# Patient Record
Sex: FEMALE | Race: BLACK OR AFRICAN AMERICAN | NOT HISPANIC OR LATINO | Employment: OTHER | URBAN - METROPOLITAN AREA
[De-identification: names, ages, dates, MRNs, and addresses within clinical notes are randomized per-mention and may not be internally consistent; named-entity substitution may affect disease eponyms.]

---

## 2018-01-30 ENCOUNTER — HOSPITAL ENCOUNTER (EMERGENCY)
Facility: HOSPITAL | Age: 23
Discharge: HOME | End: 2018-01-31
Attending: EMERGENCY MEDICINE
Payer: COMMERCIAL

## 2018-01-30 DIAGNOSIS — K52.9 CHRONIC DIARRHEA: Primary | ICD-10-CM

## 2018-01-30 PROCEDURE — 99283 EMERGENCY DEPT VISIT LOW MDM: CPT | Mod: ,,, | Performed by: EMERGENCY MEDICINE

## 2018-01-30 PROCEDURE — 99283 EMERGENCY DEPT VISIT LOW MDM: CPT

## 2018-01-30 RX ORDER — CITALOPRAM 20 MG/1
20 TABLET, FILM COATED ORAL DAILY
COMMUNITY
End: 2019-10-21 | Stop reason: SDUPTHER

## 2018-01-30 RX ORDER — INSULIN ASPART 100 [IU]/ML
INJECTION, SOLUTION INTRAVENOUS; SUBCUTANEOUS
COMMUNITY
End: 2019-05-01

## 2018-01-30 RX ORDER — FERROUS GLUCONATE 324(38)MG
324 TABLET ORAL
COMMUNITY
End: 2020-08-17 | Stop reason: SDUPTHER

## 2018-01-30 RX ORDER — METOPROLOL SUCCINATE 25 MG/1
25 TABLET, EXTENDED RELEASE ORAL DAILY
COMMUNITY
End: 2019-02-18

## 2018-01-31 VITALS
TEMPERATURE: 98 F | WEIGHT: 153 LBS | HEART RATE: 99 BPM | HEIGHT: 64 IN | RESPIRATION RATE: 18 BRPM | BODY MASS INDEX: 26.12 KG/M2 | SYSTOLIC BLOOD PRESSURE: 138 MMHG | OXYGEN SATURATION: 100 % | DIASTOLIC BLOOD PRESSURE: 86 MMHG

## 2018-01-31 LAB — WBC #/AREA STL HPF: NORMAL /[HPF]

## 2018-01-31 PROCEDURE — 89055 LEUKOCYTE ASSESSMENT FECAL: CPT

## 2018-01-31 PROCEDURE — 87427 SHIGA-LIKE TOXIN AG IA: CPT

## 2018-01-31 PROCEDURE — 87045 FECES CULTURE AEROBIC BACT: CPT

## 2018-01-31 PROCEDURE — 87209 SMEAR COMPLEX STAIN: CPT

## 2018-01-31 PROCEDURE — 87338 HPYLORI STOOL AG IA: CPT

## 2018-01-31 PROCEDURE — 87046 STOOL CULTR AEROBIC BACT EA: CPT

## 2018-01-31 RX ORDER — DICYCLOMINE HYDROCHLORIDE 20 MG/1
20 TABLET ORAL 2 TIMES DAILY
Qty: 20 TABLET | Refills: 0 | Status: SHIPPED | OUTPATIENT
Start: 2018-01-31 | End: 2018-03-02

## 2018-01-31 RX ORDER — PANTOPRAZOLE SODIUM 20 MG/1
20 TABLET, DELAYED RELEASE ORAL DAILY
Qty: 30 TABLET | Refills: 0 | Status: SHIPPED | OUTPATIENT
Start: 2018-01-31 | End: 2019-02-18

## 2018-01-31 NOTE — ED TRIAGE NOTES
22 year old female presents to the ED c/o abdominal pain and diarrhea since last August. Reports several ED and PCP visits for same

## 2018-01-31 NOTE — ED PROVIDER NOTES
"Encounter Date: 1/30/2018    SCRIBE #1 NOTE: I, Rosey Yoder, am scribing for, and in the presence of,  Dr. ABNER Balderas. I have scribed the entire note.       History     Chief Complaint   Patient presents with    Diarrhea     Patient reports abd pain with diarrhea since August. Pt was seen in at [facility] ER months ago. Pt has been to her PCP, no diagnosis     22 y.o.  female with HTN and DM presents to the ED complaining of nightly abdominal cramps for 6 months. Associates symptoms include abdominal bloating, gas, and diarrhea. She denies any of these symptoms at this time. She is followed by GI, but has not provided a stool sample for the ordered stool study 2 months PTA. The results of labs ordered by GI were unremarkable. She and her father express frustration with her continued "suffering" and want to know the cause of her symptoms. The patient has a follow up appointment with GI in 7 days.        The history is provided by the patient, medical records and a parent.     Review of patient's allergies indicates:  No Known Allergies  Past Medical History:   Diagnosis Date    Diabetes mellitus     Hypertension      No past surgical history on file.  No family history on file.  Social History   Substance Use Topics    Smoking status: Never Smoker    Smokeless tobacco: Not on file    Alcohol use No     Review of Systems   Constitutional: Negative for fever.   HENT: Negative for sore throat.    Respiratory: Negative for shortness of breath.    Cardiovascular: Negative for chest pain.   Gastrointestinal: Positive for abdominal pain and diarrhea. Negative for nausea.        (+) Bloating attributed to gas.   Genitourinary: Negative for dysuria.   Musculoskeletal: Negative for back pain.   Skin: Negative for rash.   Neurological: Negative for weakness.   Hematological: Does not bruise/bleed easily.   Psychiatric/Behavioral: Positive for sleep disturbance.       Physical Exam     Initial Vitals " [01/30/18 2059]   BP Pulse Resp Temp SpO2   (!) 182/77 100 18 98 °F (36.7 °C) 100 %      MAP       112         Physical Exam    Nursing note and vitals reviewed.  Constitutional: She appears well-developed and well-nourished. No distress.   HENT:   Head: Normocephalic and atraumatic.   Mouth/Throat: Oropharynx is clear and moist.   Eyes: Conjunctivae are normal.   Neck: Normal range of motion.   Cardiovascular: Normal rate, regular rhythm and normal heart sounds.   Pulmonary/Chest: Breath sounds normal. No respiratory distress.   Abdominal: Soft. She exhibits no distension. There is no tenderness.   Musculoskeletal: Normal range of motion.   Neurological: She is alert and oriented to person, place, and time.   Skin: Skin is warm and dry.         ED Course   Procedures  Labs Reviewed   CULTURE, STOOL   ENTEROHEMORRHAGIC E.COLI   WBC, STOOL   H. PYLORI ANTIGEN, STOOL   STOOL EXAM-OVA,CYSTS,PARASITES             Medical Decision Making:   History:   Old Medical Records: I decided to obtain old medical records.  Clinical Tests:   Lab Tests: Ordered and Reviewed            Scribe Attestation:   Scribe #1: I performed the above scribed service and the documentation accurately describes the services I performed. I attest to the accuracy of the note.    Attending Attestation:             Attending ED Notes:   Patient presents for a non emergent evaluation of a complaint that she is not currently have symptoms for. I had a aaliyah discussion with the patient and her father. She was non compliant with her initial GI specialist evaluation and never did the stool studies. This daily diarrhea she endorses is not causing weight loss, hemodynamic instability or disruption to the patients life. It is unclear what could possibly be accomplished in the emergent setting. I have offered to send the stool studies tonight and she does state she has a GI appointment schedule with [facility] on Wednesday, however after her discharge I did  note that there is no appointment in the computer noted. I will start pepcid and bentl and recommended a stool diary to present to the GI doctor.          ED Course      Clinical Impression:   The encounter diagnosis was Chronic diarrhea.    Disposition:   Disposition: Discharged  Condition: Stable                        Andrés Balderas MD  02/01/18 0733

## 2018-02-01 LAB
E COLI SXT1 STL QL IA: NEGATIVE
E COLI SXT2 STL QL IA: NEGATIVE
O+P STL TRI STN: NORMAL

## 2018-02-03 LAB — BACTERIA STL CULT: NORMAL

## 2018-02-04 LAB — H PYLORI AG STL QL: NOT DETECTED

## 2018-09-26 ENCOUNTER — TELEPHONE (OUTPATIENT)
Dept: ENDOCRINOLOGY | Facility: CLINIC | Age: 23
End: 2018-09-26

## 2019-02-14 NOTE — PROGRESS NOTES
Subjective:       Patient ID: Isabela Read is a 24 y.o. female with a PMH significant for T1D who was followed at [facility] and [facility].  Her Insurance has changed, so switching to [facility].    Chief Complaint: Establish Care    HPI     Patient denies f/c, n/v/d.  No chest pain or SOB.  No abdominal pain or dysuria.  No headaches or change in vision.  No dizziness.  No significant  weight gain or weight loss.  Remaining ROS negative.    Review of Systems   Constitutional: Negative for appetite change, chills, diaphoresis, fatigue, fever and unexpected weight change.   HENT: Negative for ear pain, hearing loss, sinus pain, tinnitus, trouble swallowing and voice change.    Eyes: Negative for photophobia, pain and visual disturbance.   Respiratory: Negative for chest tightness, shortness of breath and wheezing.    Cardiovascular: Positive for leg swelling. Negative for chest pain and palpitations.   Gastrointestinal: Negative for abdominal pain, blood in stool, constipation, diarrhea, nausea and vomiting.   Endocrine: Negative for cold intolerance, heat intolerance, polydipsia, polyphagia and polyuria.   Genitourinary: Negative for decreased urine volume, difficulty urinating, dysuria, flank pain, hematuria, pelvic pain, vaginal bleeding, vaginal discharge and vaginal pain.   Musculoskeletal: Negative for arthralgias, gait problem, joint swelling, myalgias and neck pain.   Skin: Negative for rash.   Neurological: Negative for dizziness, tremors, syncope, weakness, numbness and headaches.   Hematological: Does not bruise/bleed easily.   Psychiatric/Behavioral: Negative for agitation, confusion and sleep disturbance. The patient is not nervous/anxious.        Objective:      Physical Exam   Constitutional: She is oriented to person, place, and time. She appears well-developed and well-nourished. No distress.   HENT:   Head: Normocephalic and atraumatic.   Right Ear: External ear normal.   Left Ear: External ear  normal.   Nose: Nose normal.   Mouth/Throat: Oropharynx is clear and moist.   Eyes: Conjunctivae and EOM are normal. Pupils are equal, round, and reactive to light. No scleral icterus.   Neck: Normal range of motion. Neck supple. No JVD present. No thyromegaly present.   Cardiovascular: Normal rate, regular rhythm and intact distal pulses. Exam reveals no gallop and no friction rub.   No murmur heard.  Pulmonary/Chest: Effort normal and breath sounds normal. No respiratory distress. She has no wheezes. She has no rales.   Abdominal: Soft. Bowel sounds are normal. She exhibits no distension. There is no tenderness. There is no rebound and no guarding.   Musculoskeletal: Normal range of motion. She exhibits no edema.   Lymphadenopathy:     She has no cervical adenopathy.   Neurological: She is alert and oriented to person, place, and time. No cranial nerve deficit or sensory deficit.   Skin: Skin is warm and dry. No rash noted. No erythema.   Psychiatric: She has a normal mood and affect. Her behavior is normal. Thought content normal.       Assessment:       1. Encounter to establish care    2. Type 1 diabetes mellitus with complication    3. Essential hypertension    4. Leg swelling    5. Restrictive lung disease    6. Cervical cancer screening    7. Screen for STD (sexually transmitted disease)    8. Iron deficiency anemia, unspecified iron deficiency anemia type        Plan:   -Today's Visit - patient is awake and alert.    -Endocrine - T1D (diagnosed in 2004) - Followed at [facility] and next appointment scheduled for 2/27/2019.  Has been uncontrolled.  On Novolog SSI, Levemir 37 units at bedtime.  A1C was 11.9 in 5/2018.  Will repeat fasting labs with A1C.  Will refer to Endocrine.     Microalbumin ratio was <20 in 8/2015.  Last Eye exam - 9/2018 at [facility] and normal per patient.  Last Foot Exam - at her Endocrine last year.  Repeat next visit.    TSH was normal in 7/2018.    -Cards - HTN - on Losartan 50mg and  "Metoprolol XL 50mg.   She was prescribed Losartan-HCTZ 100-25 in 8/2018, but patient went back to the Losartan 50mg since she was not "peeing" with the Losartan.    EKG in 7/2018 with NSR with possible LAE.    Lipids in 5/2018 with , , HDL 77, LDL 98.  Repeat and needs a statin.    -Vascular - Bilateral LE Edema - first noticed in 6/2018.  Placed on Losartan-HCTZ in 8/2018.  Patient did not notice much difference, so changed herself back to Losartan alone.  Will check bilateral LE dopplers.  Check urine microalbumin ratio.  Change back to Losartan-HCTZ 100-25.    -GI - Chronic Abdominal Cramps and Diarrhea - seen in the ED on 1/30/2018 at [facility] with above symptoms for 6 months.  Stool WB negative, Stool O&P negative, Stool H pylori negative.  No imaging done.  Had EGD done in 8/2018 at [facility].  Improved.    -Heme - Anemia - Hgb 9.1 in 7/2018.  WBC and Plts normal.  History of BALDOMERO.  Repeat CBC and check Fe studies.    -Pulmonary - on Albuterol.  Had PFTs done at [facility] and told she had restrictive lung disease.  She saw a Pulmonary Specialist at [facility].  CT Chest without done in 8/2018 and unremarkable.  Will refer to Pulmonary.    -Psych - Depression/Anxiety - on Celexa since around 2017.    -GYN - Last Pap was over three year.  Will refer to GYN. We discussed HPV vaccinations - she thinks she did.   We discussed STD screening - HIV 1/2 negative in 10/2016.  On Microgestin FE.    -HCM - We discussed Flu (9/2018) and Tdap (9/2013) vaccinations.  We discussed Pneumococcal (23 - 1/2016) vaccinations.      -Follow Up - 1 weeks  "

## 2019-02-18 ENCOUNTER — OFFICE VISIT (OUTPATIENT)
Dept: OBSTETRICS AND GYNECOLOGY | Facility: CLINIC | Age: 24
End: 2019-02-18
Payer: COMMERCIAL

## 2019-02-18 ENCOUNTER — OFFICE VISIT (OUTPATIENT)
Dept: PRIMARY CARE CLINIC | Facility: CLINIC | Age: 24
End: 2019-02-18
Payer: COMMERCIAL

## 2019-02-18 VITALS
SYSTOLIC BLOOD PRESSURE: 170 MMHG | HEIGHT: 64 IN | HEART RATE: 115 BPM | WEIGHT: 155.44 LBS | OXYGEN SATURATION: 97 % | DIASTOLIC BLOOD PRESSURE: 110 MMHG | BODY MASS INDEX: 26.54 KG/M2

## 2019-02-18 VITALS
BODY MASS INDEX: 26.72 KG/M2 | DIASTOLIC BLOOD PRESSURE: 122 MMHG | SYSTOLIC BLOOD PRESSURE: 196 MMHG | HEIGHT: 64 IN | WEIGHT: 156.5 LBS

## 2019-02-18 DIAGNOSIS — I10 ESSENTIAL HYPERTENSION: ICD-10-CM

## 2019-02-18 DIAGNOSIS — D50.9 IRON DEFICIENCY ANEMIA, UNSPECIFIED IRON DEFICIENCY ANEMIA TYPE: ICD-10-CM

## 2019-02-18 DIAGNOSIS — M79.89 LEG SWELLING: ICD-10-CM

## 2019-02-18 DIAGNOSIS — Z11.3 SCREEN FOR STD (SEXUALLY TRANSMITTED DISEASE): ICD-10-CM

## 2019-02-18 DIAGNOSIS — Z01.419 ENCOUNTER FOR GYNECOLOGICAL EXAMINATION WITHOUT ABNORMAL FINDING: Primary | ICD-10-CM

## 2019-02-18 DIAGNOSIS — Z12.4 CERVICAL CANCER SCREENING: ICD-10-CM

## 2019-02-18 DIAGNOSIS — E10.8 TYPE 1 DIABETES MELLITUS WITH COMPLICATION: ICD-10-CM

## 2019-02-18 DIAGNOSIS — J98.4 RESTRICTIVE LUNG DISEASE: ICD-10-CM

## 2019-02-18 DIAGNOSIS — Z76.89 ENCOUNTER TO ESTABLISH CARE: Primary | ICD-10-CM

## 2019-02-18 LAB
C TRACH DNA SPEC QL NAA+PROBE: NOT DETECTED
N GONORRHOEA DNA SPEC QL NAA+PROBE: NOT DETECTED

## 2019-02-18 PROCEDURE — 99999 PR PBB SHADOW E&M-EST. PATIENT-LVL V: ICD-10-PCS | Mod: PBBFAC,,, | Performed by: INTERNAL MEDICINE

## 2019-02-18 PROCEDURE — 87491 CHLMYD TRACH DNA AMP PROBE: CPT

## 2019-02-18 PROCEDURE — 99385 PREV VISIT NEW AGE 18-39: CPT | Mod: S$GLB,,, | Performed by: OBSTETRICS & GYNECOLOGY

## 2019-02-18 PROCEDURE — 99999 PR PBB SHADOW E&M-EST. PATIENT-LVL II: CPT | Mod: PBBFAC,,, | Performed by: OBSTETRICS & GYNECOLOGY

## 2019-02-18 PROCEDURE — 99385 PR PREVENTIVE VISIT,NEW,18-39: ICD-10-PCS | Mod: S$GLB,,, | Performed by: OBSTETRICS & GYNECOLOGY

## 2019-02-18 PROCEDURE — 99999 PR PBB SHADOW E&M-EST. PATIENT-LVL V: CPT | Mod: PBBFAC,,, | Performed by: INTERNAL MEDICINE

## 2019-02-18 PROCEDURE — 88175 CYTOPATH C/V AUTO FLUID REDO: CPT

## 2019-02-18 PROCEDURE — 99999 PR PBB SHADOW E&M-EST. PATIENT-LVL II: ICD-10-PCS | Mod: PBBFAC,,, | Performed by: OBSTETRICS & GYNECOLOGY

## 2019-02-18 PROCEDURE — 99204 PR OFFICE/OUTPT VISIT, NEW, LEVL IV, 45-59 MIN: ICD-10-PCS | Mod: S$GLB,,, | Performed by: INTERNAL MEDICINE

## 2019-02-18 PROCEDURE — 99204 OFFICE O/P NEW MOD 45 MIN: CPT | Mod: S$GLB,,, | Performed by: INTERNAL MEDICINE

## 2019-02-18 RX ORDER — LOSARTAN POTASSIUM AND HYDROCHLOROTHIAZIDE 25; 100 MG/1; MG/1
1 TABLET ORAL DAILY
Qty: 30 TABLET | Refills: 3 | Status: SHIPPED | OUTPATIENT
Start: 2019-02-18 | End: 2019-02-18 | Stop reason: SDUPTHER

## 2019-02-18 RX ORDER — AMOXICILLIN 500 MG/1
500 TABLET, FILM COATED ORAL EVERY 12 HOURS
Qty: 20 TABLET | Refills: 0 | Status: CANCELLED | OUTPATIENT
Start: 2019-02-18 | End: 2019-02-28

## 2019-02-18 RX ORDER — METOPROLOL SUCCINATE 50 MG/1
50 TABLET, EXTENDED RELEASE ORAL DAILY
Qty: 30 TABLET | Refills: 3 | Status: SHIPPED | OUTPATIENT
Start: 2019-02-18 | End: 2019-10-21 | Stop reason: SDUPTHER

## 2019-02-18 RX ORDER — LOSARTAN POTASSIUM AND HYDROCHLOROTHIAZIDE 25; 100 MG/1; MG/1
1 TABLET ORAL DAILY
Qty: 30 TABLET | Refills: 3 | Status: SHIPPED | OUTPATIENT
Start: 2019-02-18 | End: 2019-03-13 | Stop reason: SDUPTHER

## 2019-02-18 RX ORDER — LOSARTAN POTASSIUM 50 MG/1
50 TABLET ORAL DAILY
COMMUNITY
End: 2019-02-18

## 2019-02-18 NOTE — PROGRESS NOTES
2/18/2019    Chlamydia, Amplified DNA Not Detected   N gonorrhoeae, amplified DNA Not Detected       PT HERE FOR ANNUAL.  SEEN BY PCP FOR HTN EARLIER TODAY.  WANTS STD SCREEN.    ROS:  GENERAL: No fever, chills, fatigability or weight loss.  VULVAR: No pain, no lesions and no itching.  VAGINAL: No relaxation, no itching, no discharge, no abnormal bleeding and no lesions.  ABDOMEN: No abdominal pain. Denies nausea. Denies vomiting. No diarrhea. No constipation  BREAST: Denies pain. No lumps. No discharge.  URINARY: No incontinence, no nocturia, no frequency and no dysuria.  CARDIOVASCULAR: No chest pain. No shortness of breath. No leg cramps.  NEUROLOGICAL: No headaches. No vision changes.  The remainder of the review of systems was negative.    PE:  General Appearance: overweight And Well developed. Well nourished. In no acute distress.  Vulva: Lesions: No.  Urethral Meatus: Normal size. Normal location. No lesions. No prolapse.  Urethra: No masses. No tenderness. No prolapse. No scarring.  Bladder: No masses. No tenderness.  Vagina: Mucosa NI:yes discharge no, atrophy no, cystocele no or rectocele no.  Cervix: Lesion: no  Stenotic: no Cervical motion tenderness: no  Uterus: Uterus size: 6 weeks. Support good. Uterus size: Normal  Adnexa: Masses: No Tenderness: No CDS Nodularity: No  Abdomen: overweight No masses. No tenderness.  Breasts: No bilateral masses. No bilateral discharge. No bilateral tenderness. No bilateral fibrocystic changes.  Neck: No thyroid enlargement. No thyroid masses.  Skin: Rashes: No      PROCEDURES:    PLAN:     DIAGNOSIS:  1. Encounter for gynecological examination without abnormal finding    2. Essential hypertension    3. Screen for STD (sexually transmitted disease)        MEDICATIONS & ORDERS:  Orders Placed This Encounter    C. trachomatis/N. gonorrhoeae by AMP DNA    Liquid-based pap smear, screening       Patient was counseled today on the new ACS guidelines for cervical  cytology screening as well as the current recommendations for breast cancer screening. She was counseled to follow up with her PCP for other routine health maintenance. Counseling session lasted approximately 10 minutes, and all her questions were answered.         FOLLOW-UP: With me in 12 month

## 2019-02-18 NOTE — LETTER
February 18, 2019      Cb Mcghee MD  [address] [facility] [address]  [place] [address]           [facility] [id number] [facility] [address]  [address]  [place] [address]  Phone: [phone]  Fax: [phone]          Patient: Isabela Read   MR Number: [id number]   YOB: 1995   Date of Visit: 2/18/2019       Dear Dr. Cb Mcghee:    Thank you for referring Isabela Read to me for evaluation. Attached you will find relevant portions of my assessment and plan of care.    If you have questions, please do not hesitate to call me. I look forward to following Isabela Read along with you.    Sincerely,    Clay Schulz Jr., MD    Enclosure  CC:  No Recipients    If you would like to receive this communication electronically, please contact [email or web address] or [phone] to request more information on [employer] Link access.    For providers and/or their staff who would like to refer a patient to [facility], please contact us through our one-stop-shop provider referral line, [facility], at [phone].    If you feel you have received this communication in error or would no longer like to receive these types of communications, please e-mail [email or web address]

## 2019-02-18 NOTE — PATIENT INSTRUCTIONS
Your exam was overall normal today.    Your blood pressure is a high today.  As discussed, please stop the Losartan 50mg and restart the Losartan-HCTZ 100-25 once a day.  Continue the Metoprolol for now.  Please monitor at home with a digital blood pressure cuff when sitting and rested for at least 15 minutes or longer.  Record your values and bring these with you on your return.  I would like you to see the nurse in 1 weeks for a blood pressure check.  Target blood pressure is less than 130/80.      I will order routine fasting labs today - at least 6-8 hours of fasting.    I will refer you to Endocrine.  I will refer you to Pulmonary.  I will refer you to GYN for your PAP.    I will order an ultrasound on your legs.    Return in 1 week - sooner if needed.  Please come at least 15-20 minutes before your scheduled appointment time.

## 2019-02-25 ENCOUNTER — OFFICE VISIT (OUTPATIENT)
Dept: PRIMARY CARE CLINIC | Facility: CLINIC | Age: 24
End: 2019-02-25
Payer: COMMERCIAL

## 2019-02-25 ENCOUNTER — LAB VISIT (OUTPATIENT)
Dept: LAB | Facility: HOSPITAL | Age: 24
End: 2019-02-25
Attending: INTERNAL MEDICINE
Payer: COMMERCIAL

## 2019-02-25 ENCOUNTER — PATIENT MESSAGE (OUTPATIENT)
Dept: PRIMARY CARE CLINIC | Facility: CLINIC | Age: 24
End: 2019-02-25

## 2019-02-25 ENCOUNTER — TELEPHONE (OUTPATIENT)
Dept: PRIMARY CARE CLINIC | Facility: CLINIC | Age: 24
End: 2019-02-25

## 2019-02-25 VITALS
OXYGEN SATURATION: 100 % | DIASTOLIC BLOOD PRESSURE: 87 MMHG | WEIGHT: 149.5 LBS | BODY MASS INDEX: 25.52 KG/M2 | HEART RATE: 82 BPM | HEIGHT: 64 IN | SYSTOLIC BLOOD PRESSURE: 135 MMHG

## 2019-02-25 DIAGNOSIS — Z11.3 SCREEN FOR STD (SEXUALLY TRANSMITTED DISEASE): ICD-10-CM

## 2019-02-25 DIAGNOSIS — E10.8 TYPE 1 DIABETES MELLITUS WITH COMPLICATION: ICD-10-CM

## 2019-02-25 DIAGNOSIS — Z76.89 ENCOUNTER TO ESTABLISH CARE: ICD-10-CM

## 2019-02-25 DIAGNOSIS — D64.9 NORMOCYTIC ANEMIA: ICD-10-CM

## 2019-02-25 DIAGNOSIS — E10.8 TYPE 1 DIABETES MELLITUS WITH COMPLICATION: Primary | ICD-10-CM

## 2019-02-25 DIAGNOSIS — R60.0 BILATERAL LOWER EXTREMITY EDEMA: ICD-10-CM

## 2019-02-25 DIAGNOSIS — D50.9 IRON DEFICIENCY ANEMIA, UNSPECIFIED IRON DEFICIENCY ANEMIA TYPE: ICD-10-CM

## 2019-02-25 DIAGNOSIS — N17.9 AKI (ACUTE KIDNEY INJURY): Primary | ICD-10-CM

## 2019-02-25 DIAGNOSIS — D64.9 ANEMIA, UNSPECIFIED TYPE: ICD-10-CM

## 2019-02-25 DIAGNOSIS — I10 ESSENTIAL HYPERTENSION: ICD-10-CM

## 2019-02-25 DIAGNOSIS — J98.4 LUNG DISEASE: ICD-10-CM

## 2019-02-25 LAB
25(OH)D3+25(OH)D2 SERPL-MCNC: 4 NG/ML
ALBUMIN SERPL BCP-MCNC: 2.5 G/DL
ALP SERPL-CCNC: 147 U/L
ALT SERPL W/O P-5'-P-CCNC: 14 U/L
ANION GAP SERPL CALC-SCNC: 8 MMOL/L
AST SERPL-CCNC: 16 U/L
BASOPHILS # BLD AUTO: 0.03 K/UL
BASOPHILS NFR BLD: 0.3 %
BILIRUB SERPL-MCNC: 0.2 MG/DL
BUN SERPL-MCNC: 38 MG/DL
CALCIUM SERPL-MCNC: 9.3 MG/DL
CHLORIDE SERPL-SCNC: 102 MMOL/L
CHOLEST SERPL-MCNC: 312 MG/DL
CHOLEST/HDLC SERPL: 5 {RATIO}
CO2 SERPL-SCNC: 26 MMOL/L
CREAT SERPL-MCNC: 1.8 MG/DL
DIFFERENTIAL METHOD: ABNORMAL
EOSINOPHIL # BLD AUTO: 0.2 K/UL
EOSINOPHIL NFR BLD: 2.5 %
ERYTHROCYTE [DISTWIDTH] IN BLOOD BY AUTOMATED COUNT: 13.3 %
EST. GFR  (AFRICAN AMERICAN): 44.8 ML/MIN/1.73 M^2
EST. GFR  (NON AFRICAN AMERICAN): 38.8 ML/MIN/1.73 M^2
ESTIMATED AVG GLUCOSE: 318 MG/DL
FERRITIN SERPL-MCNC: 23 NG/ML
GLUCOSE SERPL-MCNC: 126 MG/DL
HBA1C MFR BLD HPLC: 12.7 %
HCT VFR BLD AUTO: 31.4 %
HDLC SERPL-MCNC: 63 MG/DL
HDLC SERPL: 20.2 %
HGB BLD-MCNC: 9.9 G/DL
HIV 1+2 AB+HIV1 P24 AG SERPL QL IA: NEGATIVE
IMM GRANULOCYTES # BLD AUTO: 0.02 K/UL
IMM GRANULOCYTES NFR BLD AUTO: 0.2 %
IRON SERPL-MCNC: 165 UG/DL
LDLC SERPL CALC-MCNC: 171.2 MG/DL
LYMPHOCYTES # BLD AUTO: 4.1 K/UL
LYMPHOCYTES NFR BLD: 46.5 %
MCH RBC QN AUTO: 28.9 PG
MCHC RBC AUTO-ENTMCNC: 31.5 G/DL
MCV RBC AUTO: 92 FL
MONOCYTES # BLD AUTO: 0.7 K/UL
MONOCYTES NFR BLD: 7.5 %
NEUTROPHILS # BLD AUTO: 3.8 K/UL
NEUTROPHILS NFR BLD: 43 %
NONHDLC SERPL-MCNC: 249 MG/DL
NRBC BLD-RTO: 0 /100 WBC
PLATELET # BLD AUTO: 437 K/UL
PMV BLD AUTO: 10.8 FL
POTASSIUM SERPL-SCNC: 4.3 MMOL/L
PROT SERPL-MCNC: 6.7 G/DL
RBC # BLD AUTO: 3.42 M/UL
SATURATED IRON: 45 %
SODIUM SERPL-SCNC: 136 MMOL/L
TOTAL IRON BINDING CAPACITY: 367 UG/DL
TRANSFERRIN SERPL-MCNC: 248 MG/DL
TRIGL SERPL-MCNC: 389 MG/DL
TSH SERPL DL<=0.005 MIU/L-ACNC: 3.77 UIU/ML
WBC # BLD AUTO: 8.76 K/UL

## 2019-02-25 PROCEDURE — 99214 OFFICE O/P EST MOD 30 MIN: CPT | Mod: S$GLB,,, | Performed by: INTERNAL MEDICINE

## 2019-02-25 PROCEDURE — 99999 PR PBB SHADOW E&M-EST. PATIENT-LVL V: CPT | Mod: PBBFAC,,, | Performed by: INTERNAL MEDICINE

## 2019-02-25 PROCEDURE — 36415 COLL VENOUS BLD VENIPUNCTURE: CPT | Mod: PN

## 2019-02-25 PROCEDURE — 99999 PR PBB SHADOW E&M-EST. PATIENT-LVL V: ICD-10-PCS | Mod: PBBFAC,,, | Performed by: INTERNAL MEDICINE

## 2019-02-25 PROCEDURE — 86592 SYPHILIS TEST NON-TREP QUAL: CPT

## 2019-02-25 PROCEDURE — 82306 VITAMIN D 25 HYDROXY: CPT

## 2019-02-25 PROCEDURE — 83036 HEMOGLOBIN GLYCOSYLATED A1C: CPT

## 2019-02-25 PROCEDURE — 80061 LIPID PANEL: CPT

## 2019-02-25 PROCEDURE — 86703 HIV-1/HIV-2 1 RESULT ANTBDY: CPT

## 2019-02-25 PROCEDURE — 83540 ASSAY OF IRON: CPT

## 2019-02-25 PROCEDURE — 80053 COMPREHEN METABOLIC PANEL: CPT

## 2019-02-25 PROCEDURE — 84443 ASSAY THYROID STIM HORMONE: CPT

## 2019-02-25 PROCEDURE — 99214 PR OFFICE/OUTPT VISIT, EST, LEVL IV, 30-39 MIN: ICD-10-PCS | Mod: S$GLB,,, | Performed by: INTERNAL MEDICINE

## 2019-02-25 PROCEDURE — 85025 COMPLETE CBC W/AUTO DIFF WBC: CPT

## 2019-02-25 PROCEDURE — 82728 ASSAY OF FERRITIN: CPT

## 2019-02-25 RX ORDER — ERGOCALCIFEROL 1.25 MG/1
50000 CAPSULE ORAL
Qty: 12 CAPSULE | Refills: 1 | Status: SHIPPED | OUTPATIENT
Start: 2019-02-25 | End: 2019-05-26

## 2019-02-25 NOTE — TELEPHONE ENCOUNTER
This was he first available, and pt is on the waiting list. Will reach out to that department to see if they can move appointment up. 02*25*19 edie

## 2019-02-25 NOTE — PATIENT INSTRUCTIONS
Your exam was overall normal today.  Please miosturize your feet every day.    Your blood pressure was good.  There was no change in the blood pressure with position changes.  Make sure you drink adequate amounts of water daily.    Increase you Levemir to 40 units at bedtime and continue to check your Finger Sticks before meals and at bedtime.    I will follow up with your recent labs done this morning.  Please get your leg ultrasound as soon as you can if you continue to have swelling.    Return in 3 months - sooner if needed.  Please come at least 15-20 minutes before your scheduled appointment time.

## 2019-02-26 ENCOUNTER — PATIENT MESSAGE (OUTPATIENT)
Dept: PRIMARY CARE CLINIC | Facility: CLINIC | Age: 24
End: 2019-02-26

## 2019-02-26 LAB — RPR SER QL: NORMAL

## 2019-02-26 NOTE — TELEPHONE ENCOUNTER
Pt requesting refill on medication Levemir Insulin 100 units. Please authorize.  Lov: 02/25/2019.  Jeannine Pacheco LPN

## 2019-03-13 DIAGNOSIS — I10 ESSENTIAL HYPERTENSION: ICD-10-CM

## 2019-03-13 RX ORDER — LOSARTAN POTASSIUM AND HYDROCHLOROTHIAZIDE 25; 100 MG/1; MG/1
1 TABLET ORAL DAILY
Qty: 30 TABLET | Refills: 3 | Status: SHIPPED | OUTPATIENT
Start: 2019-03-13 | End: 2020-06-04

## 2019-03-19 ENCOUNTER — TELEPHONE (OUTPATIENT)
Dept: PRIMARY CARE CLINIC | Facility: CLINIC | Age: 24
End: 2019-03-19

## 2019-03-19 NOTE — TELEPHONE ENCOUNTER
Drug unavailable can you change, message from [facility]. Please advise 3*19*19 jdp

## 2019-03-19 NOTE — TELEPHONE ENCOUNTER
Spoke with pt and she stated that no local pharmacy have her medication in stock and due to her insurance she can only use [facility]. 3*19*19 edie

## 2019-03-19 NOTE — TELEPHONE ENCOUNTER
----- Message from Rosemarie Mcghee sent at 3/19/2019  2:57 PM CDT -----  Contact: Pt   Name of Who is Calling: SIXTO CARNEY [[id number]]    What is the request in detail: Pt is returning Sarbjit's call. Pt states due to her insurance she can only use the [facility] and all the local pharmacy's have this medication on back order. Please advise.     Can the clinic reply by [facility]: No     What Number to Call Back if not in [facility]: [phone]

## 2019-03-21 ENCOUNTER — TELEPHONE (OUTPATIENT)
Dept: PRIMARY CARE CLINIC | Facility: CLINIC | Age: 24
End: 2019-03-21

## 2019-03-21 NOTE — TELEPHONE ENCOUNTER
----- Message from Syeda Tam sent at 3/21/2019  1:12 PM CDT -----  Contact: pt   Name of Who is Calling: SIXTO CARNEY [[id number]]    What is the request in detail: Patient is calling to inform staff that she was able to get medication that was on back order...Please contact to further discuss and advise      Can the clinic reply by [facility]:     What Number to Call Back if not in [facility]:

## 2019-04-17 DIAGNOSIS — J98.4 RESTRICTIVE LUNG DISEASE: Primary | ICD-10-CM

## 2019-05-01 ENCOUNTER — OFFICE VISIT (OUTPATIENT)
Dept: ENDOCRINOLOGY | Facility: CLINIC | Age: 24
End: 2019-05-01
Payer: COMMERCIAL

## 2019-05-01 VITALS
HEIGHT: 64 IN | SYSTOLIC BLOOD PRESSURE: 90 MMHG | BODY MASS INDEX: 25.73 KG/M2 | DIASTOLIC BLOOD PRESSURE: 58 MMHG | WEIGHT: 150.69 LBS | HEART RATE: 92 BPM

## 2019-05-01 DIAGNOSIS — R80.9 MICROALBUMINURIA: ICD-10-CM

## 2019-05-01 DIAGNOSIS — N18.30 TYPE 1 DIABETES MELLITUS WITH STAGE 3 CHRONIC KIDNEY DISEASE: Primary | ICD-10-CM

## 2019-05-01 DIAGNOSIS — E10.22 TYPE 1 DIABETES MELLITUS WITH STAGE 3 CHRONIC KIDNEY DISEASE: Primary | ICD-10-CM

## 2019-05-01 DIAGNOSIS — E10.8 TYPE 1 DIABETES MELLITUS WITH COMPLICATION: ICD-10-CM

## 2019-05-01 DIAGNOSIS — E11.42 DIABETIC PERIPHERAL NEUROPATHY ASSOCIATED WITH TYPE 2 DIABETES MELLITUS: ICD-10-CM

## 2019-05-01 PROCEDURE — 99204 PR OFFICE/OUTPT VISIT, NEW, LEVL IV, 45-59 MIN: ICD-10-PCS | Mod: S$GLB,,, | Performed by: INTERNAL MEDICINE

## 2019-05-01 PROCEDURE — 99999 PR PBB SHADOW E&M-EST. PATIENT-LVL III: CPT | Mod: PBBFAC,,, | Performed by: INTERNAL MEDICINE

## 2019-05-01 PROCEDURE — 99999 PR PBB SHADOW E&M-EST. PATIENT-LVL III: ICD-10-PCS | Mod: PBBFAC,,, | Performed by: INTERNAL MEDICINE

## 2019-05-01 PROCEDURE — 99204 OFFICE O/P NEW MOD 45 MIN: CPT | Mod: S$GLB,,, | Performed by: INTERNAL MEDICINE

## 2019-05-01 RX ORDER — LANCETS
EACH MISCELLANEOUS
Qty: 150 EACH | Refills: 11 | Status: ON HOLD | OUTPATIENT
Start: 2019-05-01 | End: 2022-11-08 | Stop reason: HOSPADM

## 2019-05-01 RX ORDER — INSULIN ASPART 100 [IU]/ML
10 INJECTION, SOLUTION INTRAVENOUS; SUBCUTANEOUS
Qty: 15 ML | Refills: 2 | Status: SHIPPED | OUTPATIENT
Start: 2019-05-01 | End: 2019-10-21

## 2019-05-01 RX ORDER — INSULIN PUMP SYRINGE, 3 ML
EACH MISCELLANEOUS
Qty: 1 EACH | Refills: 1 | Status: ON HOLD | OUTPATIENT
Start: 2019-05-01 | End: 2021-05-24

## 2019-05-01 RX ORDER — PEN NEEDLE, DIABETIC 30 GX3/16"
NEEDLE, DISPOSABLE MISCELLANEOUS
Qty: 100 EACH | Refills: 11 | Status: ON HOLD | OUTPATIENT
Start: 2019-05-01 | End: 2022-11-08 | Stop reason: HOSPADM

## 2019-05-01 NOTE — PROGRESS NOTES
Subjective:     Patient ID: Isabela Read is a 24 y.o. female.    Chief Complaint: No chief complaint on file.    HPI:   Ms. Read is a 24 y.o. female who is here for a consult visit for evaluation for type 1 diabetes management, this was diagnosed 16 years ago.  Currently denies nocturia, polyuria, unexplained weight loss or blurred vision.    Diabetes medications include:  Levemir 40 units at bedtime   novolog SSI (1 1/2 units for every 15 gms of carbs, 1: 50 starting at 150)    Denies any side effects:   Denies any difficulties with injections or sites of injections.  One week ago had low blood sugar, 42. Symptoms include: tremulousness. At night will wake up with increased sweating.     Diabetes complications:  Last eye evaluation 9/2018.   Reports numbness, burning, tingling sensation of left leg/foot, lasted about three to four days. Not associated with high or low blood sugars or recent illness/trauma. this has resolved. Denies symptomatic CAD or CVD or kidney disease.    Last urine test 2/2019  --> 2/2019 elevated. But had high A1C at the time.   Hospitalizations for hyperglycemia/DKA three years ago.     Diabetes education: not recently     Breakfast: eggs, cheese, 1 c of fruit cantaloupe ( 3 units)  Lunch: red beans with rice (1 1/2 cups -- 60 grms 5 units)  Dinner: skipped   Snacks: cantaloupe (no insulin)    SMBG: Tests BG daily, but did not bring log/meter.   Exercise: Exercise, joined the gym. Denies hypoglycemia.     Family History:  T1DM in brother (26 years old)    Review of Systems   Constitutional: Negative for chills and fever.   HENT: Negative for congestion and sinus pressure.    Eyes: Negative for visual disturbance.   Respiratory: Negative for chest tightness and shortness of breath.    Cardiovascular: Negative for chest pain, palpitations and leg swelling.   Gastrointestinal: Positive for diarrhea. Negative for abdominal pain and vomiting.   Genitourinary: Negative for dysuria.  "  Musculoskeletal: Negative for arthralgias.   Skin: Negative for rash.   Neurological: Negative for weakness.   Hematological: Does not bruise/bleed easily.   Psychiatric/Behavioral: Negative for sleep disturbance.        Objective:     Physical Exam   Constitutional: She is oriented to person, place, and time. She appears well-developed and well-nourished. No distress.   HENT:   Head: Normocephalic and atraumatic.   Mouth/Throat: No oropharyngeal exudate.   Eyes: Pupils are equal, round, and reactive to light. Conjunctivae and EOM are normal. No scleral icterus.   Neck: Normal range of motion. Neck supple. No tracheal deviation present. No thyromegaly present.   Cardiovascular: Normal rate, regular rhythm, normal heart sounds and intact distal pulses.   Pulmonary/Chest: Effort normal and breath sounds normal. No breast discharge.   Abdominal: Soft. Bowel sounds are normal. She exhibits no distension. There is no tenderness.   Sites of insulin administration are normal appearing.   Genitourinary: No breast discharge.   Musculoskeletal: Normal range of motion. She exhibits no edema or tenderness.   Lymphadenopathy:     She has no cervical adenopathy.   Neurological: She is alert and oriented to person, place, and time. She has normal reflexes. No cranial nerve deficit.   Skin: Skin is warm and dry.   FOOT EXAM:  Visual inspection is normal, no abrasions, bruising or calluses.   Microfilament test is intact b/l.   Vibratory sense is intact b/l.   Distal pulses are present b/l.    Psychiatric: She has a normal mood and affect.       Vitals:    05/01/19 1009   BP: (!) 90/58   Pulse: 92   Weight: 68.3 kg (150 lb 11 oz)   Height: 5' 4" (1.626 m)         Results for orders placed or performed in visit on 02/25/19   CBC auto differential   Result Value Ref Range    WBC 8.76 3.90 - 12.70 K/uL    RBC 3.42 (L) 4.00 - 5.40 M/uL    Hemoglobin 9.9 (L) 12.0 - 16.0 g/dL    Hematocrit 31.4 (L) 37.0 - 48.5 %    Mean Corpuscular " Volume 92 82 - 98 fL    Mean Corpuscular Hemoglobin 28.9 27.0 - 31.0 pg    Mean Corpuscular Hemoglobin Conc 31.5 (L) 32.0 - 36.0 g/dL    RDW 13.3 11.5 - 14.5 %    Platelets 437 (H) 150 - 350 K/uL    MPV 10.8 9.2 - 12.9 fL    Immature Granulocytes 0.2 0.0 - 0.5 %    Gran # (ANC) 3.8 1.8 - 7.7 K/uL    Immature Grans (Abs) 0.02 0.00 - 0.04 K/uL    Lymph # 4.1 1.0 - 4.8 K/uL    Mono # 0.7 0.3 - 1.0 K/uL    Eos # 0.2 0.0 - 0.5 K/uL    Baso # 0.03 0.00 - 0.20 K/uL    nRBC 0 0 /100 WBC    Gran% 43.0 38.0 - 73.0 %    Lymph% 46.5 18.0 - 48.0 %    Mono% 7.5 4.0 - 15.0 %    Eosinophil% 2.5 0.0 - 8.0 %    Basophil% 0.3 0.0 - 1.9 %    Differential Method Automated    Comprehensive metabolic panel   Result Value Ref Range    Sodium 136 136 - 145 mmol/L    Potassium 4.3 3.5 - 5.1 mmol/L    Chloride 102 95 - 110 mmol/L    CO2 26 23 - 29 mmol/L    Glucose 126 (H) 70 - 110 mg/dL    BUN, Bld 38 (H) 6 - 20 mg/dL    Creatinine 1.8 (H) 0.5 - 1.4 mg/dL    Calcium 9.3 8.7 - 10.5 mg/dL    Total Protein 6.7 6.0 - 8.4 g/dL    Albumin 2.5 (L) 3.5 - 5.2 g/dL    Total Bilirubin 0.2 0.1 - 1.0 mg/dL    Alkaline Phosphatase 147 (H) 55 - 135 U/L    AST 16 10 - 40 U/L    ALT 14 10 - 44 U/L    Anion Gap 8 8 - 16 mmol/L    eGFR if African American 44.8 (A) >60 mL/min/1.73 m^2    eGFR if non  38.8 (A) >60 mL/min/1.73 m^2   Hemoglobin A1c   Result Value Ref Range    Hemoglobin A1C 12.7 (H) 4.0 - 5.6 %    Estimated Avg Glucose 318 (H) 68 - 131 mg/dL   Lipid panel   Result Value Ref Range    Cholesterol 312 (H) 120 - 199 mg/dL    Triglycerides 389 (H) 30 - 150 mg/dL    HDL 63 40 - 75 mg/dL    LDL Cholesterol 171.2 (H) 63.0 - 159.0 mg/dL    Hdl/Cholesterol Ratio 20.2 20.0 - 50.0 %    Total Cholesterol/HDL Ratio 5.0 2.0 - 5.0    Non-HDL Cholesterol 249 mg/dL   TSH   Result Value Ref Range    TSH 3.770 0.400 - 4.000 uIU/mL   Vitamin D   Result Value Ref Range    Vit D, 25-Hydroxy 4 (L) 30 - 96 ng/mL   HIV 1/2 Ag/Ab (4th Gen)   Result Value Ref  "Range    HIV 1/2 Ag/Ab Negative Negative   RPR   Result Value Ref Range    RPR Non-reactive Non-reactive   Iron and TIBC   Result Value Ref Range    Iron 165 (H) 30 - 160 ug/dL    Transferrin 248 200 - 375 mg/dL    TIBC 367 250 - 450 ug/dL    Saturated Iron 45 20 - 50 %   Ferritin   Result Value Ref Range    Ferritin 23 20.0 - 300.0 ng/mL       Assessment/Plan:       Ms. Read is a 24 year old woman with long standing Type 1 DM that is uncontrolled and complicated with CKD stage III, mild neuropathy of the right foot, and microalbuminuria.    Uses about 50 units:  ICHO 1:9  ISF 1:35  Goal correction 150 for now  Decrease lantus to 25 units     Send to DM education     1. Type 1 diabetes mellitus with stage 3 chronic kidney disease    - Ambulatory Referral to Diabetes Education  - Hemoglobin A1c; Future  - Basic metabolic panel; Future  - blood-glucose meter,continuous (DEXCOM G6 ) Misc; For daily use  Dispense: 1 each; Refill: 1  - blood-glucose sensor (DEXCOM G6 SENSOR) Michelle; For daily use  Dispense: 10 each; Refill: 4  - blood-glucose transmitter (DEXCOM G6 TRANSMITTER) Michelle; For daily use  Dispense: 1 each; Refill: 11  - blood-glucose meter kit; To check BG 4 times daily, to use with insurance preferred meter  Dispense: 1 each; Refill: 1  - lancets Misc; To check BG 4 - 6 times daily, to use with insurance preferred meter  Dispense: 150 each; Refill: 11  - blood sugar diagnostic Strp; To check BG 4 - 6 times daily, to use with insurance preferred meter  Dispense: 150 each; Refill: 11  - insulin aspart U-100 (NOVOLOG FLEXPEN U-100 INSULIN) 100 unit/mL (3 mL) InPn pen; Inject 10 Units into the skin 3 (three) times daily with meals.  Dispense: 15 mL; Refill: 2  - insulin detemir U-100 (LEVEMIR FLEXTOUCH U-100 INSULN) 100 unit/mL (3 mL) SubQ InPn pen; Inject 25 Units into the skin every evening.  Dispense: 15 mL; Refill: 2  - pen needle, diabetic 32 gauge x 5/32" Ndle; For three times daily injection  " Dispense: 100 each; Refill: 11    2. Microalbuminuria    - Ambulatory Referral to Diabetes Education  - Hemoglobin A1c; Future  - Basic metabolic panel; Future  - blood-glucose meter,continuous (DEXCOM G6 ) Misc; For daily use  Dispense: 1 each; Refill: 1  - blood-glucose sensor (DEXCOM G6 SENSOR) Michelle; For daily use  Dispense: 10 each; Refill: 4  - blood-glucose transmitter (DEXCOM G6 TRANSMITTER) Michelle; For daily use  Dispense: 1 each; Refill: 11  - blood-glucose meter kit; To check BG 4 times daily, to use with insurance preferred meter  Dispense: 1 each; Refill: 1  - lancets Misc; To check BG 4 - 6 times daily, to use with insurance preferred meter  Dispense: 150 each; Refill: 11  - blood sugar diagnostic Strp; To check BG 4 - 6 times daily, to use with insurance preferred meter  Dispense: 150 each; Refill: 11    3. Diabetic peripheral neuropathy associated with type 2 diabetes mellitus    - Ambulatory Referral to Diabetes Education  - Hemoglobin A1c; Future  - Basic metabolic panel; Future  - blood-glucose meter,continuous (DEXCOM G6 ) Misc; For daily use  Dispense: 1 each; Refill: 1  - blood-glucose sensor (DEXCOM G6 SENSOR) Michelle; For daily use  Dispense: 10 each; Refill: 4  - blood-glucose transmitter (DEXCOM G6 TRANSMITTER) Michelle; For daily use  Dispense: 1 each; Refill: 11  - blood-glucose meter kit; To check BG 4 times daily, to use with insurance preferred meter  Dispense: 1 each; Refill: 1  - lancets Misc; To check BG 4 - 6 times daily, to use with insurance preferred meter  Dispense: 150 each; Refill: 11  - blood sugar diagnostic Strp; To check BG 4 - 6 times daily, to use with insurance preferred meter  Dispense: 150 each; Refill: 11

## 2019-05-01 NOTE — PATIENT INSTRUCTIONS
For treatment of low blood sugars.   If your blood sugar is less than 70 but higher than 60, and you are not having any symptoms, please make sure you check your blood sugar again in 10 - 15 minutes, avoid tasks such as driving. If the repeat value is still in the same range, please drink a 1/4 cup of orange juice or 1 - 2 glucose tablets.     If your blood sugar reading is under 60, whether you are symptomatic or not, please treat. You can do this with 3 oral glucose tablets, juice, milk, other snacks, or a meal. Make sure you check 15 minutes after you treat.        Insulin to carb ratio 1:9  Correction factor 1:35  Decrease lantus to 25 units     Goal is to correct to 150     Keep your appointment with education     See me in three months.     Labs in three months.

## 2019-05-01 NOTE — LETTER
May 1, 2019      Cb Mcghee MD  [address]  [address]  [facility] [address]           [facility] End[facility]in[facility]  [address]  [place] [address]  Phone: [phone]          Patient: Isabela Read   MR Number: [id number]   YOB: 1995   Date of Visit: 5/1/2019       Dear Dr. Cb Mcghee:    Thank you for referring Isabela Read to me for evaluation. Attached you will find relevant portions of my assessment and plan of care.    If you have questions, please do not hesitate to call me. I look forward to following Isabela Read along with you.    Sincerely,    Luciana Mayo MD    Enclosure  CC:  No Recipients    If you would like to receive this communication electronically, please contact [email or web address] or [phone] to request more information on [employer] Link access.    For providers and/or their staff who would like to refer a patient to [facility], please contact us through our one-stop-shop provider referral line, Shelly Werner, at [phone].    If you feel you have received this communication in error or would no longer like to receive these types of communications, please e-mail [email or web address]

## 2019-05-08 ENCOUNTER — PATIENT MESSAGE (OUTPATIENT)
Dept: DIABETES | Facility: CLINIC | Age: 24
End: 2019-05-08

## 2019-05-14 ENCOUNTER — TELEPHONE (OUTPATIENT)
Dept: ENDOCRINOLOGY | Facility: CLINIC | Age: 24
End: 2019-05-14

## 2019-05-14 DIAGNOSIS — E10.22 TYPE 1 DIABETES MELLITUS WITH STAGE 3 CHRONIC KIDNEY DISEASE: ICD-10-CM

## 2019-05-14 DIAGNOSIS — E11.42 DIABETIC PERIPHERAL NEUROPATHY ASSOCIATED WITH TYPE 2 DIABETES MELLITUS: ICD-10-CM

## 2019-05-14 DIAGNOSIS — N18.30 TYPE 1 DIABETES MELLITUS WITH STAGE 3 CHRONIC KIDNEY DISEASE: ICD-10-CM

## 2019-05-14 DIAGNOSIS — R80.9 MICROALBUMINURIA: ICD-10-CM

## 2019-05-14 NOTE — TELEPHONE ENCOUNTER
Recent DEXCOM prescriptions to [facility].   Message sent to patient.   Have not received logs.

## 2019-05-14 NOTE — TELEPHONE ENCOUNTER
----- Message from Humberto Keenan PharmD sent at 5/6/2019 11:09 AM CDT -----  Regarding: Dexcom  Dr. Mayo,     I was contacting you in regards to the Dexcom prescriptions you had sent in for Ms. Read. Unfortunately our pharmacy is not contracted with her insurance. Thus the prescriptions would need to be sent to a pharmacy of her choice. If you have any questions or concerns please do let us know. Thank you.     Sincerely,   Humbreto Keenan, Pharm CARLO.   [facility]

## 2019-05-16 ENCOUNTER — PATIENT MESSAGE (OUTPATIENT)
Dept: ENDOCRINOLOGY | Facility: CLINIC | Age: 24
End: 2019-05-16

## 2019-05-20 RX ORDER — INSULIN LISPRO 100 [IU]/ML
INJECTION, SOLUTION INTRAVENOUS; SUBCUTANEOUS
Qty: 15 ML | Refills: 6 | Status: SHIPPED | OUTPATIENT
Start: 2019-05-20 | End: 2020-06-02 | Stop reason: SDUPTHER

## 2019-05-22 PROBLEM — E78.2 MIXED HYPERLIPIDEMIA: Status: ACTIVE | Noted: 2019-05-22

## 2019-05-22 PROBLEM — E55.9 VITAMIN D DEFICIENCY: Status: ACTIVE | Noted: 2019-05-22

## 2019-05-22 PROBLEM — E11.42 DIABETIC PERIPHERAL NEUROPATHY ASSOCIATED WITH TYPE 2 DIABETES MELLITUS: Status: RESOLVED | Noted: 2019-05-01 | Resolved: 2019-05-22

## 2019-06-07 DIAGNOSIS — N18.9 CHRONIC KIDNEY DISEASE, UNSPECIFIED CKD STAGE: ICD-10-CM

## 2019-06-10 ENCOUNTER — PATIENT MESSAGE (OUTPATIENT)
Dept: PRIMARY CARE CLINIC | Facility: CLINIC | Age: 24
End: 2019-06-10

## 2019-09-13 DIAGNOSIS — N18.9 CHRONIC KIDNEY DISEASE, UNSPECIFIED CKD STAGE: ICD-10-CM

## 2019-10-02 ENCOUNTER — PATIENT OUTREACH (OUTPATIENT)
Dept: ADMINISTRATIVE | Facility: OTHER | Age: 24
End: 2019-10-02

## 2019-10-02 ENCOUNTER — OFFICE VISIT (OUTPATIENT)
Dept: DERMATOLOGY | Facility: CLINIC | Age: 24
End: 2019-10-02
Payer: COMMERCIAL

## 2019-10-02 DIAGNOSIS — L30.8 PSORIASIFORM DERMATITIS: Primary | ICD-10-CM

## 2019-10-02 DIAGNOSIS — N18.30 TYPE 1 DIABETES MELLITUS WITH STAGE 3 CHRONIC KIDNEY DISEASE: ICD-10-CM

## 2019-10-02 DIAGNOSIS — L85.3 XEROSIS CUTIS: ICD-10-CM

## 2019-10-02 DIAGNOSIS — E10.22 TYPE 1 DIABETES MELLITUS WITH STAGE 3 CHRONIC KIDNEY DISEASE: ICD-10-CM

## 2019-10-02 DIAGNOSIS — L65.9 HAIR LOSS DISORDER: ICD-10-CM

## 2019-10-02 DIAGNOSIS — E11.9 TYPE 2 DIABETES MELLITUS WITHOUT COMPLICATION, UNSPECIFIED WHETHER LONG TERM INSULIN USE: Primary | ICD-10-CM

## 2019-10-02 PROCEDURE — 99202 PR OFFICE/OUTPT VISIT, NEW, LEVL II, 15-29 MIN: ICD-10-PCS | Mod: S$GLB,,, | Performed by: PHYSICIAN ASSISTANT

## 2019-10-02 PROCEDURE — 99202 OFFICE O/P NEW SF 15 MIN: CPT | Mod: S$GLB,,, | Performed by: PHYSICIAN ASSISTANT

## 2019-10-02 PROCEDURE — 99999 PR PBB SHADOW E&M-EST. PATIENT-LVL III: ICD-10-PCS | Mod: PBBFAC,,, | Performed by: PHYSICIAN ASSISTANT

## 2019-10-02 PROCEDURE — 99999 PR PBB SHADOW E&M-EST. PATIENT-LVL III: CPT | Mod: PBBFAC,,, | Performed by: PHYSICIAN ASSISTANT

## 2019-10-02 RX ORDER — FLUOCINOLONE ACETONIDE 0.11 MG/ML
OIL TOPICAL
Qty: 1 BOTTLE | Refills: 3 | Status: ON HOLD | OUTPATIENT
Start: 2019-10-02 | End: 2021-05-24

## 2019-10-02 RX ORDER — KETOCONAZOLE 20 MG/ML
SHAMPOO, SUSPENSION TOPICAL
Qty: 120 ML | Refills: 5 | Status: SHIPPED | OUTPATIENT
Start: 2019-10-02 | End: 2021-04-27 | Stop reason: ALTCHOICE

## 2019-10-02 NOTE — PATIENT INSTRUCTIONS
XEROSIS (DRY SKIN)        1. Definition    Xerosis is the term for dry skin.  We all have a natural oil coating over our skin produced by the skin oil glands.  If this oil is removed, the skin becomes dry which can lead to cracking, which can lead to inflammation.  Xerosis is usually a long-term problem that recurs often, especially in the winter.    2. Cause     Long hot baths or showers can remove our natural oil and lead to xerosis.  One should never take more than one bath or shower a day and for no longer than ten minutes.   Use of harsh soaps such as Zest, Dial, and Ivory can worsen and cause xerosis.   Cold winter weather worsens xerosis because the amount of moisture contained in cold air is much less than the amount of moisture in warm air.    3. Treatment     Treatment is intended to restore the natural oil to your skin.  Keep the skin lubricated.     Do not take more than one bath or shower a day.  Use lukewarm water, not hot.  Hot water dries out the skin.     Use a gentle moisturizing soap such as Cetaphil soap, Oil of Olay, Dove, Basis, Ivory moisture care, Restoraderm cleanser.     When toweling dry, dont rub.  Blot the skin so there is still some water left on the skin.  You should apply a moisturizing cream to all of the skin such as Cerave cream, Cetaphil cream, Restoraderm or Eucerin Original Formula cream.   Alpha hydroxyacid lotions, i.e., AmLactin, also work very well for preventing dry skin, but may burn when used on inflamed or reddened skin.     If you like to swim during the winter months, you should not use soap when getting out of the pool.  When you have finished swimming, rinse off the chlorine with cool to warm water.  If this will be the only shower of the day, then you may use Cetaphil or another mild soap to cleanse your skin.  After the shower, apply a moisturizing cream to all of the skin as above.        [address] [place] [address]/ [phone]  [phone] FAX/ [email or web address]

## 2019-10-02 NOTE — PROGRESS NOTES
Subjective:       Patient ID:  Isabela Read is a 24 y.o. female who presents for   Chief Complaint   Patient presents with    Dry Skin     arms, and legs, Xyears, dry , otc tx: lotions     Seborrheic Dermatitis     scalp, X1mo, itchy and scabs, no tx      History of Present Illness: The patient presents with chief complaint of dry skin.  Location: arms, legs, and back  Duration: yrs  Symptoms: itching (back)  Prior treatments: Cetaphil ltn, cocoa butter    Pt bathes or showers 1-2 times daily with warm water and Dove soap. Moisturizes occasionally.    Pt is also c/o hair loss, mainly to the frontal scalp x 2-3 yrs.   Hx of type 1 DM and anemia.  Denies personal or fhx of other autoimmune dz such as SLE or psoriasis.  Denies fhx of hair loss.    Dry Skin  - Initial  Affected locations: scalp, right ear and left ear (occasionally to eyebrows and nose)  Duration: 1 month  Signs / symptoms: itching and scabs  Relieving factors/Treatments tried: OTC hydrocortisone  Improvement on treatment: mild      Review of Systems   Constitutional: Negative for fever.   Skin: Positive for itching, rash and dry skin.   Endocrine:        Hx of type 1 DM   Hematologic/Lymphatic: Does not bruise/bleed easily.        Objective:    Physical Exam   Constitutional: She appears well-developed and well-nourished. No distress.   Neurological: She is alert and oriented to person, place, and time. She is not disoriented.   Psychiatric: She has a normal mood and affect.   Skin:   Areas Examined (abnormalities noted in diagram):   Scalp / Hair Palpated and Inspected  Head / Face Inspection Performed  Neck Inspection Performed  Back Inspection Performed  RUE Inspected  LUE Inspection Performed  RLE Inspected  LLE Inspection Performed                   Diagram Legend     Erythematous scaling macule/papule c/w actinic keratosis       Vascular papule c/w angioma      Pigmented verrucoid papule/plaque c/w seborrheic keratosis      Yellow  umbilicated papule c/w sebaceous hyperplasia      Irregularly shaped tan macule c/w lentigo     1-2 mm smooth white papules consistent with Milia      Movable subcutaneous cyst with punctum c/w epidermal inclusion cyst      Subcutaneous movable cyst c/w pilar cyst      Firm pink to brown papule c/w dermatofibroma      Pedunculated fleshy papule(s) c/w skin tag(s)      Evenly pigmented macule c/w junctional nevus     Mildly variegated pigmented, slightly irregular-bordered macule c/w mildly atypical nevus      Flesh colored to evenly pigmented papule c/w intradermal nevus       Pink pearly papule/plaque c/w basal cell carcinoma      Erythematous hyperkeratotic cursted plaque c/w SCC      Surgical scar with no sign of skin cancer recurrence      Open and closed comedones      Inflammatory papules and pustules      Verrucoid papule consistent consistent with wart     Erythematous eczematous patches and plaques     Dystrophic onycholytic nail with subungual debris c/w onychomycosis     Umbilicated papule    Erythematous-base heme-crusted tan verrucoid plaque consistent with inflamed seborrheic keratosis     Erythematous Silvery Scaling Plaque c/w Psoriasis     See annotation              Assessment / Plan:      Psoriasiform dermatitis - scalp and ears (psoriasis vs severe seb derm vs other)  -     fluocinolone and shower cap (DERMA-SMOOTHE/FS SCALP OIL) 0.01 % Oil; Apply oil to damp scalp nightly and cover with shower cap.  Dispense: 1 Bottle; Refill: 3  -     ketoconazole (NIZORAL) 2 % shampoo; Wash hair with medicated shampoo at least 2x/week - let sit on scalp at least 5 minutes prior to rinsing  Dispense: 120 mL; Refill: 5    Discussed biopsy if no improvement at f/u appt.    Xerosis cutis  Good skin care regimen discussed including limiting to one bath or shower/day, using lukewarm water with mild soap (Cetaphil) and moisturizing cream (CeraVe) to skin 1 - 2x/day. Brochure was provided and reviewed with  patient.    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         Follow up in about 1 month (around 11/2/2019).

## 2019-10-21 ENCOUNTER — OFFICE VISIT (OUTPATIENT)
Dept: PRIMARY CARE CLINIC | Facility: CLINIC | Age: 24
End: 2019-10-21
Payer: COMMERCIAL

## 2019-10-21 ENCOUNTER — LAB VISIT (OUTPATIENT)
Dept: LAB | Facility: HOSPITAL | Age: 24
End: 2019-10-21
Attending: INTERNAL MEDICINE
Payer: COMMERCIAL

## 2019-10-21 VITALS
OXYGEN SATURATION: 99 % | WEIGHT: 149.25 LBS | DIASTOLIC BLOOD PRESSURE: 78 MMHG | BODY MASS INDEX: 25.48 KG/M2 | SYSTOLIC BLOOD PRESSURE: 130 MMHG | HEART RATE: 103 BPM | HEIGHT: 64 IN

## 2019-10-21 DIAGNOSIS — E78.2 MIXED HYPERLIPIDEMIA: ICD-10-CM

## 2019-10-21 DIAGNOSIS — N17.9 AKI (ACUTE KIDNEY INJURY): ICD-10-CM

## 2019-10-21 DIAGNOSIS — I10 ESSENTIAL HYPERTENSION: ICD-10-CM

## 2019-10-21 DIAGNOSIS — E10.22 TYPE 1 DIABETES MELLITUS WITH STAGE 3 CHRONIC KIDNEY DISEASE: Primary | ICD-10-CM

## 2019-10-21 DIAGNOSIS — R80.9 MICROALBUMINURIA: ICD-10-CM

## 2019-10-21 DIAGNOSIS — E10.22 TYPE 1 DIABETES MELLITUS WITH STAGE 3 CHRONIC KIDNEY DISEASE: ICD-10-CM

## 2019-10-21 DIAGNOSIS — Z23 NEED FOR INFLUENZA VACCINATION: ICD-10-CM

## 2019-10-21 DIAGNOSIS — N18.30 TYPE 1 DIABETES MELLITUS WITH STAGE 3 CHRONIC KIDNEY DISEASE: Primary | ICD-10-CM

## 2019-10-21 DIAGNOSIS — D64.9 NORMOCYTIC ANEMIA: ICD-10-CM

## 2019-10-21 DIAGNOSIS — E55.9 VITAMIN D DEFICIENCY: ICD-10-CM

## 2019-10-21 DIAGNOSIS — N18.30 TYPE 1 DIABETES MELLITUS WITH STAGE 3 CHRONIC KIDNEY DISEASE: ICD-10-CM

## 2019-10-21 LAB
ANION GAP SERPL CALC-SCNC: 7 MMOL/L (ref 8–16)
BUN SERPL-MCNC: 19 MG/DL (ref 6–20)
CALCIUM SERPL-MCNC: 9.5 MG/DL (ref 8.7–10.5)
CHLORIDE SERPL-SCNC: 100 MMOL/L (ref 95–110)
CO2 SERPL-SCNC: 25 MMOL/L (ref 23–29)
CREAT SERPL-MCNC: 1.8 MG/DL (ref 0.5–1.4)
EST. GFR  (AFRICAN AMERICAN): 44.8 ML/MIN/1.73 M^2
EST. GFR  (NON AFRICAN AMERICAN): 38.8 ML/MIN/1.73 M^2
ESTIMATED AVG GLUCOSE: ABNORMAL MG/DL (ref 68–131)
FOLATE SERPL-MCNC: 4.5 NG/ML (ref 4–24)
GLUCOSE SERPL-MCNC: 375 MG/DL (ref 70–110)
HAPTOGLOB SERPL-MCNC: 231 MG/DL (ref 30–250)
HBA1C MFR BLD HPLC: >14 % (ref 4–5.6)
LDH SERPL L TO P-CCNC: 213 U/L (ref 110–260)
POTASSIUM SERPL-SCNC: 5.5 MMOL/L (ref 3.5–5.1)
RETICS/RBC NFR AUTO: 1.4 % (ref 0.5–2.5)
SODIUM SERPL-SCNC: 132 MMOL/L (ref 136–145)
VIT B12 SERPL-MCNC: 444 PG/ML (ref 210–950)

## 2019-10-21 PROCEDURE — 99999 PR PBB SHADOW E&M-EST. PATIENT-LVL IV: CPT | Mod: PBBFAC,,, | Performed by: INTERNAL MEDICINE

## 2019-10-21 PROCEDURE — 36415 COLL VENOUS BLD VENIPUNCTURE: CPT | Mod: PN

## 2019-10-21 PROCEDURE — 85045 AUTOMATED RETICULOCYTE COUNT: CPT

## 2019-10-21 PROCEDURE — 83615 LACTATE (LD) (LDH) ENZYME: CPT

## 2019-10-21 PROCEDURE — 90471 IMMUNIZATION ADMIN: CPT | Mod: S$GLB,,, | Performed by: INTERNAL MEDICINE

## 2019-10-21 PROCEDURE — 82746 ASSAY OF FOLIC ACID SERUM: CPT

## 2019-10-21 PROCEDURE — 90471 FLU VACCINE (QUAD) GREATER THAN OR EQUAL TO 3YO PRESERVATIVE FREE IM: ICD-10-PCS | Mod: S$GLB,,, | Performed by: INTERNAL MEDICINE

## 2019-10-21 PROCEDURE — 99214 OFFICE O/P EST MOD 30 MIN: CPT | Mod: 25,S$GLB,, | Performed by: INTERNAL MEDICINE

## 2019-10-21 PROCEDURE — 99214 PR OFFICE/OUTPT VISIT, EST, LEVL IV, 30-39 MIN: ICD-10-PCS | Mod: 25,S$GLB,, | Performed by: INTERNAL MEDICINE

## 2019-10-21 PROCEDURE — 90686 FLU VACCINE (QUAD) GREATER THAN OR EQUAL TO 3YO PRESERVATIVE FREE IM: ICD-10-PCS | Mod: S$GLB,,, | Performed by: INTERNAL MEDICINE

## 2019-10-21 PROCEDURE — 99999 PR PBB SHADOW E&M-EST. PATIENT-LVL IV: ICD-10-PCS | Mod: PBBFAC,,, | Performed by: INTERNAL MEDICINE

## 2019-10-21 PROCEDURE — 80048 BASIC METABOLIC PNL TOTAL CA: CPT

## 2019-10-21 PROCEDURE — 83036 HEMOGLOBIN GLYCOSYLATED A1C: CPT

## 2019-10-21 PROCEDURE — 82607 VITAMIN B-12: CPT

## 2019-10-21 PROCEDURE — 83010 ASSAY OF HAPTOGLOBIN QUANT: CPT

## 2019-10-21 PROCEDURE — 90686 IIV4 VACC NO PRSV 0.5 ML IM: CPT | Mod: S$GLB,,, | Performed by: INTERNAL MEDICINE

## 2019-10-21 RX ORDER — ALBUTEROL SULFATE 90 UG/1
AEROSOL, METERED RESPIRATORY (INHALATION)
COMMUNITY
End: 2020-09-11 | Stop reason: ALTCHOICE

## 2019-10-21 RX ORDER — METOPROLOL SUCCINATE 50 MG/1
50 TABLET, EXTENDED RELEASE ORAL DAILY
Qty: 30 TABLET | Refills: 3 | Status: SHIPPED | OUTPATIENT
Start: 2019-10-21 | End: 2020-06-24 | Stop reason: SDUPTHER

## 2019-10-21 RX ORDER — NORETHINDRONE ACETATE AND ETHINYL ESTRADIOL 1MG-20(21)
KIT ORAL
COMMUNITY
End: 2019-10-21

## 2019-10-21 RX ORDER — CITALOPRAM 20 MG/1
20 TABLET, FILM COATED ORAL DAILY
Qty: 30 TABLET | Refills: 3 | Status: SHIPPED | OUTPATIENT
Start: 2019-10-21 | End: 2020-06-24 | Stop reason: SDUPTHER

## 2019-10-21 NOTE — PROGRESS NOTES
"Subjective:       Patient ID: Isabela Read is a 24 y.o. female with a PMH significant for T1D who was seen initially by me on 2/18/2019 and for follow up on 2/25/2019.    Chief Complaint: Follow-up (medication)    HPI  Patient states her FS glucose are running"ok", but did not give numbers.  She has been out of her Metoprolol for 2 weeks.  No new complaints today.  We discussed her missed appointments and need for better adherence.    Patient denies f/c, n/v/d.  No chest pain or SOB.  No abdominal pain or dysuria.  No headaches or change in vision.  No dizziness.  No significant  weight gain or weight loss.  Remaining ROS negative.    Review of Systems   Constitutional: Negative for appetite change, chills, diaphoresis, fatigue, fever and unexpected weight change.   HENT: Negative for ear pain, hearing loss, sinus pain, tinnitus, trouble swallowing and voice change.    Eyes: Negative for photophobia, pain and visual disturbance.   Respiratory: Negative for chest tightness, shortness of breath and wheezing.    Cardiovascular: Negative for chest pain, palpitations and leg swelling.   Gastrointestinal: Negative for abdominal pain, blood in stool, constipation, diarrhea, nausea and vomiting.   Endocrine: Negative for cold intolerance, heat intolerance, polydipsia, polyphagia and polyuria.   Genitourinary: Negative for decreased urine volume, difficulty urinating, dysuria, flank pain, hematuria, pelvic pain, vaginal bleeding, vaginal discharge and vaginal pain.   Musculoskeletal: Negative for arthralgias, gait problem, joint swelling, myalgias and neck pain.   Skin: Negative for rash.   Neurological: Negative for dizziness, tremors, syncope, weakness, numbness and headaches.   Hematological: Does not bruise/bleed easily.   Psychiatric/Behavioral: Negative for agitation, confusion and sleep disturbance. The patient is not nervous/anxious.        Objective:      Physical Exam   Constitutional: She is oriented to person, " "place, and time. She appears well-developed and well-nourished. No distress.   HENT:   Head: Normocephalic and atraumatic.   Nose: Nose normal.   Mouth/Throat: Oropharynx is clear and moist.   Eyes: Pupils are equal, round, and reactive to light. Conjunctivae and EOM are normal. No scleral icterus.   Neck: Normal range of motion. Neck supple. No JVD present. No thyromegaly present.   Cardiovascular: Normal rate, regular rhythm and intact distal pulses. Exam reveals no gallop and no friction rub.   No murmur heard.  Pulses:       Dorsalis pedis pulses are 2+ on the right side, and 2+ on the left side.        Posterior tibial pulses are 2+ on the right side, and 2+ on the left side.   Pulmonary/Chest: Effort normal and breath sounds normal. No respiratory distress. She has no wheezes. She has no rales.   Abdominal: Soft. Bowel sounds are normal. She exhibits no distension. There is no tenderness. There is no rebound and no guarding.   Musculoskeletal: Normal range of motion. She exhibits no edema.   Lymphadenopathy:     She has no cervical adenopathy.   Neurological: She is alert and oriented to person, place, and time. No cranial nerve deficit or sensory deficit.   Skin: Skin is warm and dry. No rash noted. No erythema.   Psychiatric: She has a normal mood and affect. Her behavior is normal. Thought content normal.       Assessment:       1. Type 1 diabetes mellitus with stage 3 chronic kidney disease    2. Normocytic anemia    3. Essential hypertension    4. Mixed hyperlipidemia    5. Microalbuminuria    6. Vitamin D deficiency    7. Need for influenza vaccination    8. GEORGE (acute kidney injury)        Plan:   -Today's Visit - Labs ordered last visit, but not yet done.  Will reorder today.    -Endocrine - T1D (diagnosed in 2004) - was followed at [facility].  Has been uncontrolled.  Was on Novolog SSI, Levemir 37 units at bedtime.  A1C was 11.9 in 5/2018.  A1C in 2/2019 was 12.7.    Seen by Endocrine on 5/1/2019 - "Ms. " "Jacek is a 24 year old woman with long standing Type 1 DM that is uncontrolled and complicated with CKD stage III, mild neuropathy of the right foot, and microalbuminuria.  Uses about 50 units:  ICHO 1:9  ISF 1:35  Goal correction 150 for now  Decrease lantus to 25 units   Send to DM education".     Hasd Diabetic Education on 6/3/2019 and Endocrine again on 8/6/2019, but missed both.  Will reschedule.  Repeat A1C.    Microalbumin ratio was 1217.2 in 2/2019.   On Losartan.  Last Eye exam - 9/2018 at [facility] and normal per patient.   Will refer again.  Last Foot Exam - done 2/2019 by me with bilateral dry feet.  Advised moisturizer and to check feet every day.  No s/s of neuropathy.      TSH was normal in 2/2019.  Vitamin D level was 4 - treated with high dose D2 weekly, then daily D3.  Not taking daily now.  Will advised 2000 units daily.    -Cards - HTN - was on Losartan 50mg and Metoprolol XL 50mg.   She was prescribed Losartan-HCTZ 100-25 in 8/2018, but patient went back to the Losartan 50mg since she was not "peeing" with the Losartan 50mg.  I recommended she stop the Losartan and restart the Losartan-HCTZ 100-25mg in 2/2019.  BP today was better on 2/25/2019, but still borderline high.  Will continue with current dose and repeat BP in 2 weeks - patient did not keep this appointment.    BP today is stable today.  She ran out of Metoprolol for 2 weeks.  Refill today.    EKG in 7/2018 with NSR with possible LAE.    Lipids in 5/2018 with , , HDL 77, LDL 98.  Lipids in 2/2019 with , , HDL 63, .2.  Needs a statin.    -Renal - CKD - creatinine was 1.8 in 2/2018 with Creatinine Clearance of 44.8.  Creatinine ws 0.73 in 7/2018.  Proteinuria as above.  Repeat BMP and consider Renal Consult.    -Vascular - Bilateral LE Edema - first noticed in 6/2018.  Placed on Losartan-HCTZ in 8/2018.  Patient did not notice much difference, so changed herself back to Losartan alone.  Microalbumin ratio " elevated as above in 2/2019.  Change back to Losartan-HCTZ 100-25 as above.   Will check bilateral LE dopplers - ordered but not done.     -GI - Chronic Abdominal Cramps and Diarrhea - seen in the ED on 1/30/2018 at [facility] with above symptoms for 6 months.  Stool WB negative, Stool O&P negative, Stool H pylori negative.  No imaging done.  Had EGD done in 8/2018 at [facility].  Improved.    -Heme - Normocytic Anemia - Hgb 9.1 in 7/2018.  WBC and Plts normal.  History of BALDOMERO.  Repeat CBC in 2/2019 with Hgb 9.9 and Ferritin of 23 with Fe sat of 45.  Mostly likely due to chronic disease.  B12/Foalte, haptoglobin, LDH ordered but not yet done.  Reorder today.    -Pulmonary - on Albuterol.  Had PFTs done at [facility] and told she had restrictive lung disease.  She saw a Pulmonary Specialist at [facility].  CT Chest without done in 8/2018 and unremarkable.  Will refer to Pulmonary - appointment for 4/30/2019 - she did not keep this appointment.    -Psych - Depression/Anxiety - on Celexa since around 2017.    -GYN - Last Pap was on 2/18/2019.   Seen by GYN on 2/18/2019.    We discussed HPV vaccinations - she thinks she did.   9/2013.    We discussed STD screening - HIV 1/2 negative in 10/2016 and in 2/2019 negative for HIV 1/2, RPR, GC/Chlamydia negative.      On Microgestin FE.    -Derm - Psoriasiform Dermatitis - seen by Derm on 10/2/2019.    -HCM - We discussed Flu (9/2018 - repeat today) and Tdap (9/2013) vaccinations.  We discussed Pneumococcal (23 - 1/2016) vaccinations.      -Follow Up - 1 months

## 2019-10-21 NOTE — PATIENT INSTRUCTIONS
Your exam was overall normal today.    Your blood pressure was good.    We will give you the Flu Vaccine today.    I will refer you back to Endocrine and Diabetic Education.  I will refer to Optometry.  I have ordered labs to be done non-fasting.    Start Vitamin D3 at 2000 units daily - over the counter.    Return in 4 weeks - sooner if needed.  Please come at least 15-20 minutes before your scheduled appointment time.

## 2019-10-21 NOTE — PROGRESS NOTES
"Patient was given vaccine information sheet for the Flu Vaccine. The area of injection was palpated using the acromion process as a landmark. This area was cleaned with alcohol. Using a 25g 1" safety needle, 0.5mL of the vaccine was placed into the left muscle. The injection site was dressed with a bandage. Patient experienced no complications and was discharged in stable condition. Fluarix vaccine Lot: [id number] Exp: 06/30/2020.  Jeannine Pacheco LPN      "

## 2019-10-22 ENCOUNTER — PATIENT OUTREACH (OUTPATIENT)
Dept: ADMINISTRATIVE | Facility: OTHER | Age: 24
End: 2019-10-22

## 2019-10-22 ENCOUNTER — PATIENT MESSAGE (OUTPATIENT)
Dept: PRIMARY CARE CLINIC | Facility: CLINIC | Age: 24
End: 2019-10-22

## 2019-10-22 DIAGNOSIS — N18.30 TYPE 1 DIABETES MELLITUS WITH STAGE 3 CHRONIC KIDNEY DISEASE: ICD-10-CM

## 2019-10-22 DIAGNOSIS — R80.9 PROTEINURIA, UNSPECIFIED TYPE: ICD-10-CM

## 2019-10-22 DIAGNOSIS — E10.22 TYPE 1 DIABETES MELLITUS WITH STAGE 3 CHRONIC KIDNEY DISEASE: ICD-10-CM

## 2019-10-22 DIAGNOSIS — R79.89 ELEVATED SERUM CREATININE: Primary | ICD-10-CM

## 2019-10-23 ENCOUNTER — OFFICE VISIT (OUTPATIENT)
Dept: ENDOCRINOLOGY | Facility: CLINIC | Age: 24
End: 2019-10-23
Payer: COMMERCIAL

## 2019-10-23 VITALS
DIASTOLIC BLOOD PRESSURE: 74 MMHG | WEIGHT: 151.13 LBS | HEART RATE: 95 BPM | SYSTOLIC BLOOD PRESSURE: 126 MMHG | BODY MASS INDEX: 25.8 KG/M2 | HEIGHT: 64 IN

## 2019-10-23 DIAGNOSIS — N18.30 CHRONIC KIDNEY DISEASE (CKD), STAGE III (MODERATE): ICD-10-CM

## 2019-10-23 DIAGNOSIS — N18.30 TYPE 1 DIABETES MELLITUS WITH STAGE 3 CHRONIC KIDNEY DISEASE: ICD-10-CM

## 2019-10-23 DIAGNOSIS — E10.22 TYPE 1 DIABETES MELLITUS WITH STAGE 3 CHRONIC KIDNEY DISEASE: ICD-10-CM

## 2019-10-23 DIAGNOSIS — E55.9 VITAMIN D DEFICIENCY: ICD-10-CM

## 2019-10-23 PROCEDURE — 99214 OFFICE O/P EST MOD 30 MIN: CPT | Mod: S$GLB,,, | Performed by: INTERNAL MEDICINE

## 2019-10-23 PROCEDURE — 99214 PR OFFICE/OUTPT VISIT, EST, LEVL IV, 30-39 MIN: ICD-10-PCS | Mod: S$GLB,,, | Performed by: INTERNAL MEDICINE

## 2019-10-23 PROCEDURE — 99999 PR PBB SHADOW E&M-EST. PATIENT-LVL III: CPT | Mod: PBBFAC,,, | Performed by: INTERNAL MEDICINE

## 2019-10-23 PROCEDURE — 99999 PR PBB SHADOW E&M-EST. PATIENT-LVL III: ICD-10-PCS | Mod: PBBFAC,,, | Performed by: INTERNAL MEDICINE

## 2019-10-23 NOTE — LETTER
October 23, 2019      Cb Mcghee MD  [address]  [address]  [facility] [address]           [facility] - Endocrinology 6th [place]  [address]  [place] [address]  Phone: [phone]  Fax: [phone]          Patient: Isabela Read   MR Number: [id number]   YOB: 1995   Date of Visit: 10/23/2019       Dear Dr. Cb Mcghee:    Thank you for referring Isabela Read to me for evaluation. Attached you will find relevant portions of my assessment and plan of care.    If you have questions, please do not hesitate to call me. I look forward to following Isabela Read along with you.    Sincerely,    Luciana Mayo MD    Enclosure  CC:  No Recipients    If you would like to receive this communication electronically, please contact [email or web address] or [phone] to request more information on [employer] access.    For providers and/or their staff who would like to refer a patient to [facility], please contact us through our one-stop-shop provider referral line, [facility], at [phone].    If you feel you have received this communication in error or would no longer like to receive these types of communications, please e-mail [email or web address]

## 2019-10-23 NOTE — PROGRESS NOTES
Subjective:      Patient ID: Isabela Read is a 24 y.o. female.    Chief Complaint:  Diabetes      History of Present Illness  Ms. Read is a 24 year old woman with long standing Type 1 DM that is uncontrolled and complicated with CKD stage III, mild neuropathy of the right foot, and microalbuminuria who is here for a follow up visit.     Reports humalog not working. Does not feel badly, despite A1C of 14%    At her last visit, five months ago we adjusted her regimen:  Humalog before or right when she is about to eat.   Reports moving around her injections, reports some resistance when she injects in some areas of her abdomen. Avoids abdomen and uses thighs    ICHO 1:9 (breakfast -- skips generally if takes uses 5 units, lunch - 10 units, dinner - skips - 10 units (sandwich and yogurt)  ISF 1:35 (uses extra because she feels it does not work)  Goal correction 150 for now    Levemir to 27 units at bedtime     Frequently skips meals regularly. Reports good compliance with injections.    Hypoglycemia occurs rarely.   Symptoms include increased sweating.     Review of Systems   Constitutional: Negative for fever.   HENT: Negative for congestion.    Eyes: Negative for visual disturbance.   Respiratory: Negative for shortness of breath.    Cardiovascular: Negative for chest pain.   Gastrointestinal: Negative for abdominal pain.   Genitourinary: Negative for dysuria.   Musculoskeletal: Negative for arthralgias.   Skin: Negative for rash.   Neurological: Negative for weakness.   Hematological: Does not bruise/bleed easily.   Psychiatric/Behavioral: Negative for sleep disturbance.       Objective:   Physical Exam   Constitutional: She is oriented to person, place, and time. She appears well-developed and well-nourished. No distress.   HENT:   Head: Normocephalic and atraumatic.   Mouth/Throat: No oropharyngeal exudate.   Eyes: Pupils are equal, round, and reactive to light. Conjunctivae and EOM are normal. No scleral  "icterus.   Neck: Normal range of motion. Neck supple. No tracheal deviation present. No thyromegaly present.   Cardiovascular: Normal rate, regular rhythm, normal heart sounds and intact distal pulses.   Pulmonary/Chest: Effort normal and breath sounds normal. No breast discharge.   Abdominal: Soft. Bowel sounds are normal. She exhibits no distension. There is no tenderness.   Sites of insulin administration are normal appearing.(thighs)   Genitourinary: No breast discharge.   Musculoskeletal: Normal range of motion. She exhibits no edema or tenderness.   Lymphadenopathy:     She has no cervical adenopathy.   Neurological: She is alert and oriented to person, place, and time. She has normal reflexes. No cranial nerve deficit.   Skin: Skin is warm and dry.   FOOT EXAM:  Visual inspection is normal, no abrasions, bruising or calluses.   Microfilament test is intact b/l.   Vibratory sense is intact b/l.   Distal pulses are present b/l.    Psychiatric: She has a normal mood and affect.     Vitals:    10/23/19 1350   BP: 126/74   Pulse: 95   Weight: 68.6 kg (151 lb 2 oz)   Height: 5' 4" (1.626 m)       BP Readings from Last 3 Encounters:   10/23/19 126/74   10/21/19 130/78   05/01/19 (!) 90/58     Wt Readings from Last 1 Encounters:   10/23/19 1350 68.6 kg (151 lb 2 oz)         Body mass index is 25.94 kg/m².    Lab Review:   Lab Results   Component Value Date    HGBA1C >14.0 (H) 10/21/2019     Lab Results   Component Value Date    CHOL 312 (H) 02/25/2019    HDL 63 02/25/2019    LDLCALC 171.2 (H) 02/25/2019    TRIG 389 (H) 02/25/2019    CHOLHDL 20.2 02/25/2019     Lab Results   Component Value Date     (L) 10/21/2019    K 5.5 (H) 10/21/2019     10/21/2019    CO2 25 10/21/2019     (H) 10/21/2019    BUN 19 10/21/2019    CREATININE 1.8 (H) 10/21/2019    CALCIUM 9.5 10/21/2019    PROT 6.7 02/25/2019    ALBUMIN 2.5 (L) 02/25/2019    BILITOT 0.2 02/25/2019    ALKPHOS 147 (H) 02/25/2019    AST 16 02/25/2019 "    ALT 14 02/25/2019    ANIONGAP 7 (L) 10/21/2019    ESTGFRAFRICA 44.8 (A) 10/21/2019    EGFRNONAA 38.8 (A) 10/21/2019    TSH 3.770 02/25/2019     Results for SIXTO CARNEY (MRN [id number]) as of 10/23/2019 11:05   Ref. Range 2/25/2019 07:26 10/21/2019 13:49   Vit D, 25-Hydroxy Latest Ref Range: 30 - 96 ng/mL 4 (L)    Vitamin B-12 Latest Ref Range: 210 - 950 pg/mL  444       Assessment and Plan     Type 1 diabetes mellitus with diabetic chronic kidney disease  Did not bring log   Using feelings to adjust insulin (not feeling well gives more insulin -- doubles the injection -- no hypoglycemia)  Based on weight even using modifier of 0.6 u/hr she is taking too much levemir -- before we make any adjustments plan to obtain prof CGM    Continue current dosing, explained pharmacokinetics of meal time insulin, what to expect two hours and fours hours after injection of humalog    Sites of insulin administration are normal appearing.    CGM clinic  12/4 at 2:30 PM with me  a1c one week before visit    Get professional CGM  Try for ally at home     DM education was not approved by insurance    Vitamin D deficiency  Has CKD   Benefit from MVI    Chronic kidney disease (CKD), stage III (moderate)  High risk for hypoglycemia  Needs prof cgm

## 2019-10-23 NOTE — ASSESSMENT & PLAN NOTE
Did not bring log   Using feelings to adjust insulin (not feeling well gives more insulin -- doubles the injection -- no hypoglycemia)  Based on weight even using modifier of 0.6 u/hr she is taking too much levemir -- before we make any adjustments plan to obtain prof CGM    Continue current dosing, explained pharmacokinetics of meal time insulin, what to expect two hours and fours hours after injection of humalog    Sites of insulin administration are normal appearing.    CGM clinic  12/4 at 2:30 PM with me  a1c one week before visit    Get professional CGM  Try for ally at home     DM education was not approved by insurance

## 2019-10-24 DIAGNOSIS — E10.22 TYPE 1 DIABETES MELLITUS WITH STAGE 3 CHRONIC KIDNEY DISEASE: ICD-10-CM

## 2019-10-24 DIAGNOSIS — N18.30 TYPE 1 DIABETES MELLITUS WITH STAGE 3 CHRONIC KIDNEY DISEASE: ICD-10-CM

## 2019-10-28 ENCOUNTER — TELEPHONE (OUTPATIENT)
Dept: PRIMARY CARE CLINIC | Facility: CLINIC | Age: 24
End: 2019-10-28

## 2019-10-28 NOTE — TELEPHONE ENCOUNTER
I tried to schedule the above patient an appointment to see someone in Diabetic Education.Patient states her insurance does not cover it. Please advise. Thanks.

## 2019-10-29 ENCOUNTER — PATIENT OUTREACH (OUTPATIENT)
Dept: ADMINISTRATIVE | Facility: OTHER | Age: 24
End: 2019-10-29

## 2019-10-30 ENCOUNTER — CLINICAL SUPPORT (OUTPATIENT)
Dept: ENDOCRINOLOGY | Facility: CLINIC | Age: 24
End: 2019-10-30
Payer: COMMERCIAL

## 2019-10-30 DIAGNOSIS — N18.30 TYPE 1 DIABETES MELLITUS WITH STAGE 3 CHRONIC KIDNEY DISEASE: ICD-10-CM

## 2019-10-30 DIAGNOSIS — E10.22 TYPE 1 DIABETES MELLITUS WITH STAGE 3 CHRONIC KIDNEY DISEASE: ICD-10-CM

## 2019-10-30 NOTE — PROGRESS NOTES
"DIABETES EDUCATOR NOTE   PLACEMENT OF FREESTYLE MARCY PRO SENSOR  CONTINOUS GLUCOSE MONITORING SYSTEM (CGMS)    Patient is here in clinic today for placement of continuous glucose monitoring sensor.      Each patient verified that they were here for CGMS procedure ordered by their provider and that they have a working glucose meter and supplies at home.   Patient will be provided with a Freestyle Marcy Sensor and a copy of the Continuous Glucose Monitoring Patient Log to fill out during the study.   A detailed  explanation of Continuous Glucose Monitoring was  provided. Patient informed that this is a blind procedure and that they will not actually see the blood sugar tracing in real time.  Reviewed with patient the [employer] patient education handout called "Your Freestyle Marcy Pro sensor: What you need to know" to review self-care during the study to avoid sensor loosening or removal ie... Bathing, Swimming, dressing, and exercising.   Instructed patient to check blood sugar using home glucometer and to record the following on provided patient log sheets:Blood sugar taken at home, Meals and snacks, Activity, and Diabetes medications taken and dosage    Patient was brought to a private location.  Arm for insertion was selected and prepared and allowed to dry. Glucose Sensor Serial Number [id number]  was inserted to back of patient's right upper arm.    The following forms  were given and reviewed in detail with patient and all questions answered.   · Continuous Glucose Monitoring Patient Log #[id number]  · Freestyle [employer] Dialog Patient Handout "Your Freestyle Marcy Pro Sensor: What you need to know"     Instructions held in a group: Time: 15 min   Insertion of sensor done individually in private:  Time: 5 minutes       "

## 2019-11-04 ENCOUNTER — PATIENT OUTREACH (OUTPATIENT)
Dept: ADMINISTRATIVE | Facility: OTHER | Age: 24
End: 2019-11-04

## 2019-11-04 ENCOUNTER — PATIENT OUTREACH (OUTPATIENT)
Dept: ADMINISTRATIVE | Facility: HOSPITAL | Age: 24
End: 2019-11-04

## 2019-11-05 ENCOUNTER — TELEPHONE (OUTPATIENT)
Dept: INTERNAL MEDICINE | Facility: CLINIC | Age: 24
End: 2019-11-05

## 2019-11-05 ENCOUNTER — CLINICAL SUPPORT (OUTPATIENT)
Dept: ENDOCRINOLOGY | Facility: CLINIC | Age: 24
End: 2019-11-05
Payer: COMMERCIAL

## 2019-11-05 DIAGNOSIS — N18.30 TYPE 1 DIABETES MELLITUS WITH STAGE 3 CHRONIC KIDNEY DISEASE: ICD-10-CM

## 2019-11-05 DIAGNOSIS — E10.22 TYPE 1 DIABETES MELLITUS WITH STAGE 3 CHRONIC KIDNEY DISEASE: ICD-10-CM

## 2019-11-05 PROCEDURE — 95251 PR GLUCOSE MONITOR, 72 HOUR, PHYS INTERP: ICD-10-PCS | Mod: S$GLB,,, | Performed by: INTERNAL MEDICINE

## 2019-11-05 PROCEDURE — 99999 PR PBB SHADOW E&M-EST. PATIENT-LVL I: CPT | Mod: PBBFAC,,,

## 2019-11-05 PROCEDURE — 99999 PR PBB SHADOW E&M-EST. PATIENT-LVL I: ICD-10-PCS | Mod: PBBFAC,,,

## 2019-11-05 PROCEDURE — 95251 CONT GLUC MNTR ANALYSIS I&R: CPT | Mod: S$GLB,,, | Performed by: INTERNAL MEDICINE

## 2019-11-05 PROCEDURE — 95250 CONT GLUC MNTR PHYS/QHP EQP: CPT | Mod: S$GLB,,, | Performed by: DIETITIAN, REGISTERED

## 2019-11-05 PROCEDURE — 95250 PR GLUCOSE MONITORING,72 HRS,SUB-Q SENSOR: ICD-10-PCS | Mod: S$GLB,,, | Performed by: DIETITIAN, REGISTERED

## 2019-11-05 NOTE — TELEPHONE ENCOUNTER
----- Message from Cb Mcghee MD sent at 10/22/2019  6:43 AM CDT -----  Regarding: Nephrology  Please have patient schedule Renal appointment as soon as possible.  Thanks.

## 2019-11-05 NOTE — TELEPHONE ENCOUNTER
Attempt calling pt to informed her that Dr Mcghee referred her to Nephrology.  Scheduled pt with Next available 3/16/2020 at 9 am was unsuccessful.  Left voice message for pt.  Mailed her appt letter

## 2019-11-06 NOTE — PROGRESS NOTES
DIABETES EDUCATOR NOTE   Return of the Freestyle Jayda Pro Sensor and Patient Log.    Patient returned to clinic today to return Glucose Sensor and signed patient log form used in CMGS procedure.    The CGMS Sensor will be scanned and downloaded. All reports will be imported into the patient's electronic medical record.    Endocrine Nurse Practitioner will complete data interpretation and make recommendations; will forward recommendations to the ordering provider for follow up with patient.

## 2019-11-07 ENCOUNTER — PATIENT MESSAGE (OUTPATIENT)
Dept: ENDOCRINOLOGY | Facility: CLINIC | Age: 24
End: 2019-11-07

## 2019-11-07 DIAGNOSIS — N18.30 TYPE 1 DIABETES MELLITUS WITH STAGE 3 CHRONIC KIDNEY DISEASE: Primary | ICD-10-CM

## 2019-11-07 DIAGNOSIS — E10.22 TYPE 1 DIABETES MELLITUS WITH STAGE 3 CHRONIC KIDNEY DISEASE: Primary | ICD-10-CM

## 2019-11-07 RX ORDER — INSULIN DEGLUDEC 100 U/ML
20 INJECTION, SOLUTION SUBCUTANEOUS NIGHTLY
Qty: 15 ML | Refills: 6 | Status: SHIPPED | OUTPATIENT
Start: 2019-11-07 | End: 2020-01-27 | Stop reason: CLARIF

## 2019-11-07 NOTE — TELEPHONE ENCOUNTER
Reviewed CGM    saud to levemir 10 units twice a day or tresiba 20 u nits at [id number]   Continue ICHO 1:9  Start ISF 1:50 with goal of 125    Needs DM education  Schedule NP with a1c in three months

## 2019-11-13 ENCOUNTER — PATIENT OUTREACH (OUTPATIENT)
Dept: ADMINISTRATIVE | Facility: OTHER | Age: 24
End: 2019-11-13

## 2019-11-19 ENCOUNTER — PATIENT OUTREACH (OUTPATIENT)
Dept: ADMINISTRATIVE | Facility: OTHER | Age: 24
End: 2019-11-19

## 2019-11-25 ENCOUNTER — LAB VISIT (OUTPATIENT)
Dept: LAB | Facility: OTHER | Age: 24
End: 2019-11-25
Attending: OBSTETRICS & GYNECOLOGY
Payer: COMMERCIAL

## 2019-11-25 ENCOUNTER — OFFICE VISIT (OUTPATIENT)
Dept: OBSTETRICS AND GYNECOLOGY | Facility: CLINIC | Age: 24
End: 2019-11-25
Payer: COMMERCIAL

## 2019-11-25 ENCOUNTER — PATIENT OUTREACH (OUTPATIENT)
Dept: ADMINISTRATIVE | Facility: OTHER | Age: 24
End: 2019-11-25

## 2019-11-25 VITALS
DIASTOLIC BLOOD PRESSURE: 84 MMHG | SYSTOLIC BLOOD PRESSURE: 146 MMHG | WEIGHT: 143.31 LBS | HEIGHT: 64 IN | BODY MASS INDEX: 24.46 KG/M2

## 2019-11-25 DIAGNOSIS — Z11.3 SCREENING EXAMINATION FOR STD (SEXUALLY TRANSMITTED DISEASE): Primary | ICD-10-CM

## 2019-11-25 DIAGNOSIS — Z11.3 SCREENING EXAMINATION FOR STD (SEXUALLY TRANSMITTED DISEASE): ICD-10-CM

## 2019-11-25 PROCEDURE — 86592 SYPHILIS TEST NON-TREP QUAL: CPT

## 2019-11-25 PROCEDURE — 99999 PR PBB SHADOW E&M-EST. PATIENT-LVL III: CPT | Mod: PBBFAC,,, | Performed by: OBSTETRICS & GYNECOLOGY

## 2019-11-25 PROCEDURE — 86803 HEPATITIS C AB TEST: CPT

## 2019-11-25 PROCEDURE — 87491 CHLMYD TRACH DNA AMP PROBE: CPT

## 2019-11-25 PROCEDURE — 99214 OFFICE O/P EST MOD 30 MIN: CPT | Mod: S$GLB,,, | Performed by: OBSTETRICS & GYNECOLOGY

## 2019-11-25 PROCEDURE — 99214 PR OFFICE/OUTPT VISIT, EST, LEVL IV, 30-39 MIN: ICD-10-PCS | Mod: S$GLB,,, | Performed by: OBSTETRICS & GYNECOLOGY

## 2019-11-25 PROCEDURE — 86703 HIV-1/HIV-2 1 RESULT ANTBDY: CPT

## 2019-11-25 PROCEDURE — 99999 PR PBB SHADOW E&M-EST. PATIENT-LVL III: ICD-10-PCS | Mod: PBBFAC,,, | Performed by: OBSTETRICS & GYNECOLOGY

## 2019-11-25 PROCEDURE — 36415 COLL VENOUS BLD VENIPUNCTURE: CPT

## 2019-11-25 PROCEDURE — 87340 HEPATITIS B SURFACE AG IA: CPT

## 2019-11-25 RX ORDER — ACETAMINOPHEN AND CODEINE PHOSPHATE 120; 12 MG/5ML; MG/5ML
1 SOLUTION ORAL DAILY
Qty: 90 TABLET | Refills: 3 | Status: SHIPPED | OUTPATIENT
Start: 2019-11-25 | End: 2020-10-15

## 2019-11-25 NOTE — PROGRESS NOTES
CC: Discuss birth control    HPI: she is here for above. Had annual exam earlier this year. Has only had 1 of 3 gardisil vaccine injections. would like to discuss birth control options. previously used pills and NuvaRing before.  Not currently SA. She does carry a diagnosis of HTN and is on medication.     Past Medical History:   Diagnosis Date    Diabetes mellitus     Hypertension        History reviewed. No pertinent surgical history.    OB History    None         Current Outpatient Medications on File Prior to Visit   Medication Sig Dispense Refill    albuterol (VENTOLIN HFA) 90 mcg/actuation inhaler Inhale into the lungs.      blood sugar diagnostic Strp To check BG 4 - 6 times daily, to use with insurance preferred meter 150 each 11    blood-glucose meter kit To check BG 4 times daily, to use with insurance preferred meter 1 each 1    citalopram (CELEXA) 20 MG tablet Take 1 tablet (20 mg total) by mouth once daily. 30 tablet 3    ferrous gluconate (FERGON) 324 MG tablet Take 324 mg by mouth daily with breakfast.      flash glucose scanning reader (FREESTYLE MARCY 14 DAY READER) Misc 1 each by Misc.(Non-Drug; Combo Route) route once daily. 1 each 0    flash glucose sensor (FREESTYLE MARCY 14 DAY SENSOR) Kit Route 1 every fourteen (fourteen ) days. 2 kit 11    fluocinolone and shower cap (DERMA-SMOOTHE/FS SCALP OIL) 0.01 % Oil Apply oil to damp scalp nightly and cover with shower cap. 1 Bottle 3    insulin degludec (TRESIBA FLEXTOUCH U-100) 100 unit/mL (3 mL) InPn Inject 20 Units into the skin every evening. 15 mL 6    insulin lispro (HUMALOG KWIKPEN INSULIN) 100 unit/mL pen Inject 10 units into skin the skin 3 three times daily with meals 15 mL 6    ketoconazole (NIZORAL) 2 % shampoo Wash hair with medicated shampoo at least 2x/week - let sit on scalp at least 5 minutes prior to rinsing 120 mL 5    lancets Misc To check BG 4 - 6 times daily, to use with insurance preferred meter 150 each 11     "losartan-hydrochlorothiazide 100-25 mg (HYZAAR) 100-25 mg per tablet Take 1 tablet by mouth once daily. 30 tablet 3    metoprolol succinate (TOPROL-XL) 50 MG 24 hr tablet Take 1 tablet (50 mg total) by mouth once daily. 30 tablet 3    pen needle, diabetic 32 gauge x 5/32" Ndle For three times daily injection 100 each 11     No current facility-administered medications on file prior to visit.          ROS:  GENERAL: Denies weight gain or weight loss. Feeling well overall.   SKIN: Denies rash or lesions.   HEAD: Denies head injury or headache.   CHEST: Denies chest pain or shortness of breath.   CARDIOVASCULAR: Denies palpitations or left sided chest pain.   ABDOMEN: No abdominal pain, constipation, diarrhea, nausea, vomiting or rectal bleeding.   REPRODUCTIVE: See HPI.   HEMATOLOGIC: No easy bruisability or excessive bleeding.     Physical Exam:   BP (!) 146/84   Ht 5' 4" (1.626 m)   Wt 65 kg (143 lb 4.8 oz)   LMP 10/26/2019 (Exact Date)   BMI 24.60 kg/m²   General: No distress, well appearing  HEENT: normocephalic, atraumatic   Heart: Regular rate  Lungs: No increased work of breathing  MS: lower extremeties symmetrical, no edema  Pelvic Exam:  deferred  PSYCH: Normal affect, mood appropriate       Assessment/Plan: 23 yo here to discuss contraception. She has history of HTN, currently on hyzaar and metoprolol. We reviewed the WHO [facility] criteria for CHC and HTN. We reviewed progestin only options including POPS, depo provera, LARC methods.    1. She is most interested in progestin only pills. rx sent. She is likely going to proceed with LARC method, but would like to consider for now. Written information given and she will let us know.  2. Needs last 2 doses of gardisil, ordered today  3. Desires STI screening - ordered  4. Reviewed EC including [email or web address]    Marion Jeffrey MD  Obstetrics and Gynecology  [facility]    "

## 2019-11-26 LAB
C TRACH DNA SPEC QL NAA+PROBE: NOT DETECTED
HBV SURFACE AG SERPL QL IA: NEGATIVE
HCV AB SERPL QL IA: NEGATIVE
HIV 1+2 AB+HIV1 P24 AG SERPL QL IA: NEGATIVE
N GONORRHOEA DNA SPEC QL NAA+PROBE: NOT DETECTED
RPR SER QL: NORMAL

## 2019-12-01 ENCOUNTER — PATIENT OUTREACH (OUTPATIENT)
Dept: ADMINISTRATIVE | Facility: OTHER | Age: 24
End: 2019-12-01

## 2020-01-08 ENCOUNTER — PATIENT OUTREACH (OUTPATIENT)
Dept: ADMINISTRATIVE | Facility: OTHER | Age: 25
End: 2020-01-08

## 2020-01-15 ENCOUNTER — PATIENT OUTREACH (OUTPATIENT)
Dept: ADMINISTRATIVE | Facility: OTHER | Age: 25
End: 2020-01-15

## 2020-01-15 ENCOUNTER — OFFICE VISIT (OUTPATIENT)
Dept: PULMONOLOGY | Facility: CLINIC | Age: 25
End: 2020-01-15
Payer: COMMERCIAL

## 2020-01-15 VITALS
DIASTOLIC BLOOD PRESSURE: 70 MMHG | HEART RATE: 104 BPM | WEIGHT: 143.06 LBS | SYSTOLIC BLOOD PRESSURE: 124 MMHG | BODY MASS INDEX: 24.42 KG/M2 | OXYGEN SATURATION: 99 % | HEIGHT: 64 IN

## 2020-01-15 DIAGNOSIS — R06.02 SHORTNESS OF BREATH: Primary | ICD-10-CM

## 2020-01-15 DIAGNOSIS — R06.02 SOB (SHORTNESS OF BREATH): ICD-10-CM

## 2020-01-15 PROCEDURE — 99204 PR OFFICE/OUTPT VISIT, NEW, LEVL IV, 45-59 MIN: ICD-10-PCS | Mod: S$GLB,,, | Performed by: NURSE PRACTITIONER

## 2020-01-15 PROCEDURE — 99999 PR PBB SHADOW E&M-EST. PATIENT-LVL III: CPT | Mod: PBBFAC,,, | Performed by: NURSE PRACTITIONER

## 2020-01-15 PROCEDURE — 99204 OFFICE O/P NEW MOD 45 MIN: CPT | Mod: S$GLB,,, | Performed by: NURSE PRACTITIONER

## 2020-01-15 PROCEDURE — 99999 PR PBB SHADOW E&M-EST. PATIENT-LVL III: ICD-10-PCS | Mod: PBBFAC,,, | Performed by: NURSE PRACTITIONER

## 2020-01-15 NOTE — PROGRESS NOTES
Subjective:       Patient ID: Isabela Read is a 24 y.o. female.    Chief Complaint: Shortness of breath    HPI:   Isabela Read is a 24 y.o. female who presents with evaluation for ongoing shortness of breath  Was diagnosed with restrictive lung disease at [facility] 1 year ago.  Did PFTs at [facility], no medications.  Started with shortness of breath, difficulty gettuing air and so on for a while.  Has used Symbicort in the past and albuterol but was never told why to use it.  Nothing has really helped, never smoker. No lung problems, no rashes, no joint pains.     Patient has a history of Type 1 DM      Review of Systems   Constitutional: Positive for chills. Negative for fever, activity change, fatigue and night sweats.   HENT: Negative for postnasal drip, rhinorrhea, trouble swallowing and congestion.    Eyes: Negative for itching.   Respiratory: Positive for cough (very rarely), shortness of breath, wheezing and dyspnea on extertion. Negative for hemoptysis, sputum production, choking, chest tightness and use of rescue inhaler.    Cardiovascular: Negative for chest pain and palpitations.   Genitourinary: Negative for difficulty urinating.   Endocrine: Negative for cold intolerance and heat intolerance.    Musculoskeletal: Negative for arthralgias.   Skin: Negative for rash.   Gastrointestinal: Negative for nausea, vomiting and acid reflux.   Neurological: Negative for dizziness and light-headedness.   Hematological: Negative for adenopathy.   Psychiatric/Behavioral: Positive for sleep disturbance.         Social History     Tobacco Use    Smoking status: Never Smoker    Smokeless tobacco: Never Used   Substance Use Topics    Alcohol use: No       Review of patient's allergies indicates:  No Known Allergies  Past Medical History:   Diagnosis Date    Diabetes mellitus     Hypertension      No past surgical history on file.  Current Outpatient Medications on File Prior to Visit   Medication Sig    albuterol  "(VENTOLIN HFA) 90 mcg/actuation inhaler Inhale into the lungs.    blood sugar diagnostic Strp To check BG 4 - 6 times daily, to use with insurance preferred meter    blood-glucose meter kit To check BG 4 times daily, to use with insurance preferred meter    citalopram (CELEXA) 20 MG tablet Take 1 tablet (20 mg total) by mouth once daily.    ferrous gluconate (FERGON) 324 MG tablet Take 324 mg by mouth daily with breakfast.    flash glucose scanning reader (FREESTYLE MARCY 14 DAY READER) Misc 1 each by Misc.(Non-Drug; Combo Route) route once daily.    flash glucose sensor (FREESTYLE MARCY 14 DAY SENSOR) Kit Route 1 every fourteen (fourteen ) days.    fluocinolone and shower cap (DERMA-SMOOTHE/FS SCALP OIL) 0.01 % Oil Apply oil to damp scalp nightly and cover with shower cap.    insulin degludec (TRESIBA FLEXTOUCH U-100) 100 unit/mL (3 mL) InPn Inject 20 Units into the skin every evening.    insulin lispro (HUMALOG KWIKPEN INSULIN) 100 unit/mL pen Inject 10 units into skin the skin 3 three times daily with meals    ketoconazole (NIZORAL) 2 % shampoo Wash hair with medicated shampoo at least 2x/week - let sit on scalp at least 5 minutes prior to rinsing    lancets Misc To check BG 4 - 6 times daily, to use with insurance preferred meter    losartan-hydrochlorothiazide 100-25 mg (HYZAAR) 100-25 mg per tablet Take 1 tablet by mouth once daily.    metoprolol succinate (TOPROL-XL) 50 MG 24 hr tablet Take 1 tablet (50 mg total) by mouth once daily.    norethindrone (ORTHO MICRONOR) 0.35 mg tablet Take 1 tablet (0.35 mg total) by mouth once daily.    pen needle, diabetic 32 gauge x 5/32" Ndle For three times daily injection     No current facility-administered medications on file prior to visit.        Objective:      There were no vitals filed for this visit.  Physical Exam   Constitutional: She is oriented to person, place, and time. She appears well-developed and well-nourished. No distress.   HENT:   Head: " Normocephalic.   Neck: Normal range of motion. Neck supple.   Cardiovascular: Normal rate and regular rhythm.   No murmur heard.  Pulmonary/Chest: Normal expansion, symmetric chest wall expansion, effort normal and breath sounds normal. No respiratory distress. She has no decreased breath sounds. She has no wheezes. She has no rhonchi. She has no rales.   Abdominal: Soft. She exhibits no distension. There is no hepatosplenomegaly. There is no tenderness.   Musculoskeletal: Normal range of motion. She exhibits no edema.   Lymphadenopathy:     She has no cervical adenopathy.   Neurological: She is alert and oriented to person, place, and time. Gait normal.   Skin: Skin is warm and dry. No cyanosis. Nails show no clubbing.   Psychiatric: She has a normal mood and affect.   Vitals reviewed.    Personal Diagnostic Review            Assessment:     Problem List Items Addressed This Visit        Other    SOB (shortness of breath)    Overview     Previous workup at [facility] showed restriction and a decreased DLCO.  Await PFTs.  CT chest in 2019 was not revealing at the time.          Current Assessment & Plan     Patient denies any rheumatological symptoms.  Never smoker.  Could be multifactorial, advise patient to increase her activity as tolerated.            Other Visit Diagnoses     Shortness of breath    -  Primary    Relevant Orders    Spirometry with/without bronchodilator    DLCO-Carbon Monoxide Diffusing Capacity    LUNG VOLUMES    Stress test, pulmonary    X-Ray Chest PA And Lateral    CBC auto differential

## 2020-01-15 NOTE — PATIENT INSTRUCTIONS
Get your breathing tests and chest xray, I will message you with the results    Based on your tests results we may adjust your follow up appointment

## 2020-01-20 NOTE — ASSESSMENT & PLAN NOTE
Patient denies any rheumatological symptoms.  Never smoker.  Could be multifactorial, advise patient to increase her activity as tolerated.

## 2020-01-27 ENCOUNTER — PATIENT MESSAGE (OUTPATIENT)
Dept: ENDOCRINOLOGY | Facility: CLINIC | Age: 25
End: 2020-01-27

## 2020-01-27 RX ORDER — INSULIN GLARGINE 100 [IU]/ML
INJECTION, SOLUTION SUBCUTANEOUS
Qty: 15 ML | Refills: 6 | Status: ON HOLD | OUTPATIENT
Start: 2020-01-27 | End: 2020-11-11 | Stop reason: HOSPADM

## 2020-02-03 ENCOUNTER — PATIENT OUTREACH (OUTPATIENT)
Dept: ADMINISTRATIVE | Facility: OTHER | Age: 25
End: 2020-02-03

## 2020-02-03 ENCOUNTER — PATIENT MESSAGE (OUTPATIENT)
Dept: ENDOCRINOLOGY | Facility: CLINIC | Age: 25
End: 2020-02-03

## 2020-02-03 NOTE — PROGRESS NOTES
Chart reviewed.   Immunizations: updated  Orders placed: n/a  Upcoming appts: A1c scheduled 2/6/20  Eye cam previously ordered.

## 2020-02-09 ENCOUNTER — PATIENT OUTREACH (OUTPATIENT)
Dept: ADMINISTRATIVE | Facility: OTHER | Age: 25
End: 2020-02-09

## 2020-02-10 ENCOUNTER — OFFICE VISIT (OUTPATIENT)
Dept: GASTROENTEROLOGY | Facility: CLINIC | Age: 25
End: 2020-02-10
Payer: COMMERCIAL

## 2020-02-10 VITALS
BODY MASS INDEX: 25.29 KG/M2 | DIASTOLIC BLOOD PRESSURE: 93 MMHG | WEIGHT: 148.13 LBS | HEART RATE: 109 BPM | HEIGHT: 64 IN | SYSTOLIC BLOOD PRESSURE: 148 MMHG

## 2020-02-10 DIAGNOSIS — K52.9 CHRONIC DIARRHEA: ICD-10-CM

## 2020-02-10 DIAGNOSIS — K31.84 GASTROPARESIS: Primary | ICD-10-CM

## 2020-02-10 PROCEDURE — 99204 PR OFFICE/OUTPT VISIT, NEW, LEVL IV, 45-59 MIN: ICD-10-PCS | Mod: S$GLB,,, | Performed by: INTERNAL MEDICINE

## 2020-02-10 PROCEDURE — 99999 PR PBB SHADOW E&M-EST. PATIENT-LVL III: ICD-10-PCS | Mod: PBBFAC,,, | Performed by: INTERNAL MEDICINE

## 2020-02-10 PROCEDURE — 99204 OFFICE O/P NEW MOD 45 MIN: CPT | Mod: S$GLB,,, | Performed by: INTERNAL MEDICINE

## 2020-02-10 PROCEDURE — 99999 PR PBB SHADOW E&M-EST. PATIENT-LVL III: CPT | Mod: PBBFAC,,, | Performed by: INTERNAL MEDICINE

## 2020-02-10 NOTE — PROGRESS NOTES
Subjective:       Patient ID: Isabela Read is a 25 y.o. female.    Chief Complaint: GI Problem    HPI  Review of Systems   Constitutional: Negative for activity change, appetite change, chills, diaphoresis, fatigue, fever and unexpected weight change.   HENT: Negative for congestion, ear pain, mouth sores, nosebleeds, postnasal drip, rhinorrhea, sinus pressure, sore throat, trouble swallowing and voice change.    Eyes: Negative for pain.   Respiratory: Positive for shortness of breath. Negative for cough and wheezing.    Cardiovascular: Negative for chest pain, palpitations and leg swelling.   Genitourinary: Negative for difficulty urinating, dysuria, flank pain, hematuria and menstrual problem.   Musculoskeletal: Negative for arthralgias, back pain, gait problem, joint swelling, myalgias and neck pain.   Skin: Negative for rash.   Neurological: Negative for dizziness, tremors, syncope, numbness and headaches.   Hematological: Negative for adenopathy. Does not bruise/bleed easily.   Psychiatric/Behavioral: Negative for agitation, behavioral problems, confusion, decreased concentration and dysphoric mood. The patient is not nervous/anxious.        Objective:      Physical Exam   Constitutional: She is oriented to person, place, and time. She appears well-developed and well-nourished. No distress.   HENT:   Head: Normocephalic and atraumatic.   Right Ear: External ear normal.   Left Ear: External ear normal.   Nose: Nose normal.   Mouth/Throat: Oropharynx is clear and moist. No oropharyngeal exudate.   Eyes: Pupils are equal, round, and reactive to light. Conjunctivae are normal. No scleral icterus.   Neck: Normal range of motion. Neck supple. No thyromegaly present.   Cardiovascular: Normal rate, regular rhythm, normal heart sounds and intact distal pulses. Exam reveals no gallop.   No murmur heard.  Pulmonary/Chest: Effort normal and breath sounds normal. She has no wheezes. She has no rales.   Abdominal: Soft.  Normal appearance and bowel sounds are normal. She exhibits no shifting dullness, no distension, no fluid wave, no abdominal bruit and no mass. There is no hepatosplenomegaly. There is no tenderness.   Musculoskeletal: Normal range of motion. She exhibits no edema or tenderness.   Lymphadenopathy:     She has no cervical adenopathy.   Neurological: She is alert and oriented to person, place, and time. No cranial nerve deficit.   Skin: Skin is warm and dry. No rash noted.   Psychiatric: She has a normal mood and affect. Her behavior is normal. Judgment and thought content normal.     Ame is here today as a new patient.   Assessment:       1. Gastroparesis    2. Chronic diarrhea        Plan:         Ame is here today as a new patient. This is a 3rd opinion.  She  does not remember the names of the doctors she ha seen for this problem but she did have EGD and was told that there was food in the stomach.    She has 24-year-old woman with diabetes, CKD, hypertension restrictive lung disease.    Her chief complaint is that of diarrhea.  She has had diarrhea for 3 years.  She has it every day.  It does not alternate with constipation.  Every stool is loose and watery.  There is no blood in the stool.  It happens day but also at night with multiple nocturnal stools.  She denies abdominal pain.  But she does have bloating and distention.  She has frequent belching.  She does describe acid reflux.  She also describes early satiety in prolonged postprandial fullness.  She feels like food stays in her epigastric region sometimes until she vomits.    Assessment  1.  Gastroparesis I think she likely has diabetic gastroparesis.  I would like to order a gastric emptying scan.  If it is positive I think we still should do an EGD but would do the gastroparesis  protocol so that she is on liquids for several days ahead of time.  if the emptying scan is abnormal we will probably start Reglan to see if that helps her.  I did  reinforce that diabetic control is important for gastric function.  2.  Chronic diarrhea this may well be diabetic diarrhea with her nocturnal stools.  I think it is unlikely that is IBD but that certainly is possible.  It could be pancreatic enzyme insufficiency as well. I will consider colonoscopy but I want to establish the gastroparesis diagnosis 1st.    Recommendation  One gastric emptying scan  2.  Consider EGD and colonoscopy after that  3.  Consider Creon enzymes supplementation.

## 2020-02-10 NOTE — LETTER
February 10, 2020      Cb Mcghee MD  [address]  [facility] [address]           [facility]  [address]  [place] [address]  Phone: [phone]  Fax: [phone]          Patient: Isabela Read   MR Number: [id number]   YOB: 1995   Date of Visit: 2/10/2020       Dear Dr. Cb Mcghee:    Thank you for referring Isabela Read to me for evaluation. Attached you will find relevant portions of my assessment and plan of care.    If you have questions, please do not hesitate to call me. I look forward to following Isabela Read along with you.    Sincerely,    Florentin Millan MD    Enclosure  CC:  No Recipients    If you would like to receive this communication electronically, please contact [email or web address] or [phone] to request more information on [employer] access.    For providers and/or their staff who would like to refer a patient to [facility], please contact us through our one-stop-shop provider referral line, [facility], at [phone].    If you feel you have received this communication in error or would no longer like to receive these types of communications, please e-mail [email or web address]

## 2020-02-18 ENCOUNTER — HOSPITAL ENCOUNTER (OUTPATIENT)
Dept: RADIOLOGY | Facility: HOSPITAL | Age: 25
Discharge: HOME | End: 2020-02-18
Attending: INTERNAL MEDICINE
Payer: COMMERCIAL

## 2020-02-18 ENCOUNTER — TELEPHONE (OUTPATIENT)
Dept: GASTROENTEROLOGY | Facility: CLINIC | Age: 25
End: 2020-02-18

## 2020-02-18 DIAGNOSIS — K31.84 GASTROPARESIS: ICD-10-CM

## 2020-02-18 PROCEDURE — 78264 NM GASTRIC EMPTYING: ICD-10-PCS | Mod: 26,,, | Performed by: RADIOLOGY

## 2020-02-18 PROCEDURE — A9541 TC99M SULFUR COLLOID: HCPCS

## 2020-02-18 PROCEDURE — 78264 GASTRIC EMPTYING IMG STUDY: CPT | Mod: 26,,, | Performed by: RADIOLOGY

## 2020-02-18 NOTE — TELEPHONE ENCOUNTER
Patient aware and expressed understanding.  Number given to her to call the endoscopy schedulers to set up EGD/Colon.

## 2020-02-18 NOTE — TELEPHONE ENCOUNTER
----- Message from Florentin Millan MD sent at 2/18/2020  2:32 PM CST -----  Please call   the scan was normal, it looks like the stomach empties ok.  I can set her up for egd and colon now, or she can come back and see Lester

## 2020-03-05 ENCOUNTER — TELEPHONE (OUTPATIENT)
Dept: GASTROENTEROLOGY | Facility: CLINIC | Age: 25
End: 2020-03-05

## 2020-03-05 ENCOUNTER — TELEPHONE (OUTPATIENT)
Dept: PULMONOLOGY | Facility: CLINIC | Age: 25
End: 2020-03-05

## 2020-03-05 ENCOUNTER — PATIENT MESSAGE (OUTPATIENT)
Dept: GASTROENTEROLOGY | Facility: CLINIC | Age: 25
End: 2020-03-05

## 2020-03-05 DIAGNOSIS — K52.9 CHRONIC DIARRHEA: Primary | ICD-10-CM

## 2020-03-05 NOTE — TELEPHONE ENCOUNTER
----- Message from Kassidy Byers sent at 3/5/2020  1:58 PM CST -----  Contact: Patient   Reason: Patient would like to reschedule all of her test that was cancelled in January.     Contact: [phone]

## 2020-03-05 NOTE — TELEPHONE ENCOUNTER
MA contacted pt to ask what was going on pt said she had a miss call from Emilia RUVALCABA explained to pt that Emilia told her on the last encounter that he needed an EGD/Colon. MA will send message to  and send the number to the schedulers via portal when the order has been placed.             ----- Message from Tona Santos sent at 3/5/2020 10:59 AM CST -----  Returning call to office    Pt contact [phone]

## 2020-03-05 NOTE — TELEPHONE ENCOUNTER
Spoke with patient, informed her that I have received her message. I advised patient that I can schedule her appointments that has been canceled in January 2020. Patient verbalized that she understand. Patient appointment has been scheduled beginning with patient lab work at 9:05 am on 3/9/2020. Patient verbalized that she understand and excepted the appointment.

## 2020-03-09 ENCOUNTER — HOSPITAL ENCOUNTER (OUTPATIENT)
Dept: PULMONOLOGY | Facility: CLINIC | Age: 25
Discharge: HOME | End: 2020-03-09
Payer: COMMERCIAL

## 2020-03-09 ENCOUNTER — HOSPITAL ENCOUNTER (OUTPATIENT)
Dept: RADIOLOGY | Facility: HOSPITAL | Age: 25
Discharge: HOME | End: 2020-03-09
Attending: NURSE PRACTITIONER
Payer: COMMERCIAL

## 2020-03-09 VITALS — WEIGHT: 145.06 LBS | HEIGHT: 66 IN | BODY MASS INDEX: 23.31 KG/M2

## 2020-03-09 DIAGNOSIS — R06.02 SHORTNESS OF BREATH: ICD-10-CM

## 2020-03-09 LAB
DLCO ADJ PRE: 14.53 ML/(MIN*MMHG) (ref 23.46–34.93)
DLCO SINGLE BREATH LLN: 23.46
DLCO SINGLE BREATH PRE REF: 49.8 %
DLCO SINGLE BREATH REF: 29.2
DLCOC SBVA LLN: 4.02
DLCOC SBVA PRE REF: 77.9 %
DLCOC SBVA REF: 5.51
DLCOC SINGLE BREATH LLN: 23.46
DLCOC SINGLE BREATH PRE REF: 49.8 %
DLCOC SINGLE BREATH REF: 29.2
DLCOCSBVAULN: 7
DLCOCSINGLEBREATHULN: 34.93
DLCOSINGLEBREATHULN: 34.93
DLCOVA LLN: 4.02
DLCOVA PRE REF: 77.9 %
DLCOVA PRE: 4.29 ML/(MIN*MMHG*L) (ref 4.02–7)
DLCOVA REF: 5.51
DLCOVAULN: 7
DLVAADJ PRE: 4.29 ML/(MIN*MMHG*L) (ref 4.02–7)
ERVN2 LLN: 1.35
ERVN2 PRE REF: 84.6 %
ERVN2 PRE: 1.14 L (ref 1.35–1.35)
ERVN2 REF: 1.35
ERVN2ULN: 1.35
FEF 25 75 LLN: 2.08
FEF 25 75 PRE REF: 73.8 %
FEF 25 75 REF: 3.5
FET100 CHG: 4.1 %
FEV05 LLN: 1.58
FEV05 REF: 2.44
FEV1 CHG: -5.5 %
FEV1 FVC LLN: 75
FEV1 FVC PRE REF: 93.4 %
FEV1 FVC REF: 87
FEV1 LLN: 2.37
FEV1 PRE REF: 79.5 %
FEV1 REF: 3.03
FEV1 VOL CHG: -0.13
FRCN2 LLN: 1.97
FRCN2 PRE REF: 66 %
FRCN2 REF: 2.79
FRCN2ULN: 3.61
FVC CHG: -7.3 %
FVC LLN: 2.75
FVC PRE REF: 84.7 %
FVC REF: 3.52
FVC VOL CHG: -0.22
IVC PRE: 2.65 L (ref 2.75–4.28)
IVC SINGLE BREATH LLN: 2.75
IVC SINGLE BREATH PRE REF: 75.4 %
IVC SINGLE BREATH REF: 3.52
IVCSINGLEBREATHULN: 4.28
PEF LLN: 5.13
PEF PRE REF: 96.5 %
PEF REF: 7.27
PHYSICIAN COMMENT: ABNORMAL
POST FEF 25 75: 2.51 L/S (ref 2.08–4.91)
POST FET 100: 7.05 SEC
POST FEV1 FVC: 82.51 % (ref 75.41–97.95)
POST FEV1: 2.28 L (ref 2.37–3.69)
POST FEV5: 1.9 L (ref 1.58–3.29)
POST FVC: 2.76 L (ref 2.75–4.28)
POST PEF: 6.76 L/S (ref 5.13–9.41)
PRE DLCO: 14.53 ML/(MIN*MMHG) (ref 23.46–34.93)
PRE FEF 25 75: 2.58 L/S (ref 2.08–4.91)
PRE FET 100: 6.77 SEC
PRE FEV05 REF: 83.7 %
PRE FEV1 FVC: 80.96 % (ref 75.41–97.95)
PRE FEV1: 2.41 L (ref 2.37–3.69)
PRE FEV5: 2.04 L (ref 1.58–3.29)
PRE FRC N2: 1.84 L
PRE FVC: 2.98 L (ref 2.75–4.28)
PRE PEF: 7.01 L/S (ref 5.13–9.41)
RVN2 LLN: 0.87
RVN2 PRE REF: 48.6 %
RVN2 PRE: 0.7 L (ref 0.87–2.02)
RVN2 REF: 1.44
RVN2TLCN2 LLN: 17.87
RVN2TLCN2 PRE REF: 72.6 %
RVN2TLCN2 PRE: 19.94 % (ref 17.87–37.05)
RVN2TLCN2 REF: 27.46
RVN2TLCN2ULN: 37.05
RVN2ULN: 2.02
TLCN2 LLN: 4.31
TLCN2 PRE REF: 66.3 %
TLCN2 PRE: 3.51 L (ref 4.31–6.29)
TLCN2 REF: 5.3
TLCN2ULN: 6.29
VA PRE: 3.38 L (ref 5.15–5.15)
VA SINGLE BREATH LLN: 5.15
VA SINGLE BREATH PRE REF: 65.7 %
VA SINGLE BREATH REF: 5.15
VASINGLEBREATHULN: 5.15
VCMAXN2 LLN: 2.75
VCMAXN2 PRE REF: 79.9 %
VCMAXN2 PRE: 2.81 L (ref 2.75–4.28)
VCMAXN2 REF: 3.52
VCMAXN2ULN: 4.28

## 2020-03-09 PROCEDURE — 94010 BREATHING CAPACITY TEST: ICD-10-PCS | Mod: S$GLB,,, | Performed by: INTERNAL MEDICINE

## 2020-03-09 PROCEDURE — 94729 DIFFUSING CAPACITY: CPT | Mod: S$GLB,,, | Performed by: INTERNAL MEDICINE

## 2020-03-09 PROCEDURE — 71046 XR CHEST PA AND LATERAL: ICD-10-PCS | Mod: 26,,, | Performed by: RADIOLOGY

## 2020-03-09 PROCEDURE — 94727 GAS DIL/WSHOT DETER LNG VOL: CPT | Mod: S$GLB,,, | Performed by: INTERNAL MEDICINE

## 2020-03-09 PROCEDURE — 94618 PULMONARY STRESS TESTING: CPT | Mod: S$GLB,,, | Performed by: INTERNAL MEDICINE

## 2020-03-09 PROCEDURE — 71046 X-RAY EXAM CHEST 2 VIEWS: CPT | Mod: 26,,, | Performed by: RADIOLOGY

## 2020-03-09 PROCEDURE — 94729 PR C02/MEMBANE DIFFUSE CAPACITY: ICD-10-PCS | Mod: S$GLB,,, | Performed by: INTERNAL MEDICINE

## 2020-03-09 PROCEDURE — 94618 PULMONARY STRESS TESTING: ICD-10-PCS | Mod: S$GLB,,, | Performed by: INTERNAL MEDICINE

## 2020-03-09 PROCEDURE — 94727 PR PULM FUNCTION TEST BY GAS: ICD-10-PCS | Mod: S$GLB,,, | Performed by: INTERNAL MEDICINE

## 2020-03-09 PROCEDURE — 71046 X-RAY EXAM CHEST 2 VIEWS: CPT | Mod: TC,FY

## 2020-03-09 PROCEDURE — 94010 BREATHING CAPACITY TEST: CPT | Mod: S$GLB,,, | Performed by: INTERNAL MEDICINE

## 2020-03-09 NOTE — PROCEDURES
Isabela Read is a 25 y.o.  female patient, who presents for a 6 minute walk test ordered by KUMAR Melissa.  The diagnosis is Shortness of Breath.  The patient's BMI is 23.4 kg/m2.  Predicted distance (lower limit of normal) is 586.23 meters.      Test Results:    The test was completed without stopping.  .  The total time walked was 360 seconds.  During walking, the patient reported:  Dyspnea. The patient used no assistive devices  during testing.     03/09/2020---------Distance: 320.04 meters (1050 feet)     O2 Sat % Supplemental Oxygen Heart Rate Blood Pressure Carlos Scale   Pre-exercise  (Resting) 100 % Room Air 101 bpm (!) 170/94 mmHg 3   During Exercise 100 % Room Air 102 bpm 145/73 mmHg 4   Post-exercise  (Recovery) 100 % Room Air  102 bpm   mmHg       Recovery Time: 65 seconds    Performing nurse/tech: Estop[employer] RRT      PREVIOUS STUDY:   The patient has not had a previous study.      CLINICAL INTERPRETATION:  Six minute walk distance is 320.04 meters (1050 feet) with somewhat heavy dyspnea.  During exercise, there was no significant desaturation while breathing room air.  Blood pressure decreased significantly and Heart rate remained stable with walking.  Hypertension and Tachycardia was present prior to exercise.  This may represent an abnormal cardiovascular response to exercise.  The patient did not report non-pulmonary symptoms during exercise.  No previous study performed.  Based upon age and body mass index, exercise capacity is less than predicted.

## 2020-03-14 ENCOUNTER — PATIENT OUTREACH (OUTPATIENT)
Dept: ADMINISTRATIVE | Facility: OTHER | Age: 25
End: 2020-03-14

## 2020-03-14 NOTE — PROGRESS NOTES
Chart reviewed.   Requested updates from Care Everywhere.  Immunizations reconciled.   HM updated.  DM eye exam previously ordered.

## 2020-03-18 ENCOUNTER — PATIENT MESSAGE (OUTPATIENT)
Dept: PULMONOLOGY | Facility: CLINIC | Age: 25
End: 2020-03-18

## 2020-03-18 DIAGNOSIS — R06.02 SHORTNESS OF BREATH: ICD-10-CM

## 2020-04-02 ENCOUNTER — TELEPHONE (OUTPATIENT)
Dept: PULMONOLOGY | Facility: CLINIC | Age: 25
End: 2020-04-02

## 2020-04-02 NOTE — TELEPHONE ENCOUNTER
I spoke to patient to offer a virtual visit on 4/27/20 at 2:30pm. Patient requested to be rescheduled to a later date due to it being a follow up with ct scan prior. Patient verbalized understanding.

## 2020-04-28 ENCOUNTER — TELEPHONE (OUTPATIENT)
Dept: GASTROENTEROLOGY | Facility: CLINIC | Age: 25
End: 2020-04-28

## 2020-04-28 NOTE — TELEPHONE ENCOUNTER
MA contacted pt to offer virtual visit appt and move pt appt up. Pt accepted appt on 5/6 at 1 pm and was told how to log on for the visit. MA will send pre-check instructions to pt portal.  Pt verbalized all understanding.

## 2020-05-05 ENCOUNTER — PATIENT OUTREACH (OUTPATIENT)
Dept: ADMINISTRATIVE | Facility: OTHER | Age: 25
End: 2020-05-05

## 2020-05-06 ENCOUNTER — PATIENT MESSAGE (OUTPATIENT)
Dept: GASTROENTEROLOGY | Facility: CLINIC | Age: 25
End: 2020-05-06

## 2020-05-11 ENCOUNTER — PATIENT OUTREACH (OUTPATIENT)
Dept: ADMINISTRATIVE | Facility: OTHER | Age: 25
End: 2020-05-11

## 2020-05-29 DIAGNOSIS — I10 ESSENTIAL HYPERTENSION: ICD-10-CM

## 2020-06-04 RX ORDER — LOSARTAN POTASSIUM AND HYDROCHLOROTHIAZIDE 25; 100 MG/1; MG/1
1 TABLET ORAL DAILY
Qty: 90 TABLET | Refills: 0 | Status: SHIPPED | OUTPATIENT
Start: 2020-06-04 | End: 2020-06-24 | Stop reason: SDUPTHER

## 2020-06-24 ENCOUNTER — OFFICE VISIT (OUTPATIENT)
Dept: INTERNAL MEDICINE | Facility: CLINIC | Age: 25
End: 2020-06-24
Payer: COMMERCIAL

## 2020-06-24 ENCOUNTER — TELEPHONE (OUTPATIENT)
Dept: ENDOCRINOLOGY | Facility: CLINIC | Age: 25
End: 2020-06-24

## 2020-06-24 ENCOUNTER — LAB VISIT (OUTPATIENT)
Dept: LAB | Facility: HOSPITAL | Age: 25
End: 2020-06-24
Attending: INTERNAL MEDICINE
Payer: COMMERCIAL

## 2020-06-24 VITALS
BODY MASS INDEX: 22.74 KG/M2 | WEIGHT: 140.88 LBS | DIASTOLIC BLOOD PRESSURE: 80 MMHG | HEART RATE: 57 BPM | OXYGEN SATURATION: 100 % | SYSTOLIC BLOOD PRESSURE: 136 MMHG

## 2020-06-24 DIAGNOSIS — J98.4 RESTRICTIVE LUNG DISEASE: ICD-10-CM

## 2020-06-24 DIAGNOSIS — N18.30 TYPE 1 DIABETES MELLITUS WITH STAGE 3 CHRONIC KIDNEY DISEASE: ICD-10-CM

## 2020-06-24 DIAGNOSIS — I10 ESSENTIAL HYPERTENSION: ICD-10-CM

## 2020-06-24 DIAGNOSIS — R00.2 PALPITATIONS: ICD-10-CM

## 2020-06-24 DIAGNOSIS — E10.22 TYPE 1 DIABETES MELLITUS WITH DIABETIC CHRONIC KIDNEY DISEASE, UNSPECIFIED CKD STAGE: ICD-10-CM

## 2020-06-24 DIAGNOSIS — E10.22 TYPE 1 DIABETES MELLITUS WITH STAGE 3 CHRONIC KIDNEY DISEASE: ICD-10-CM

## 2020-06-24 DIAGNOSIS — F32.A ANXIETY AND DEPRESSION: ICD-10-CM

## 2020-06-24 DIAGNOSIS — E10.22 TYPE 1 DIABETES MELLITUS WITH DIABETIC CHRONIC KIDNEY DISEASE, UNSPECIFIED CKD STAGE: Primary | ICD-10-CM

## 2020-06-24 DIAGNOSIS — I10 ESSENTIAL HYPERTENSION: Primary | ICD-10-CM

## 2020-06-24 DIAGNOSIS — F41.9 ANXIETY AND DEPRESSION: ICD-10-CM

## 2020-06-24 DIAGNOSIS — R42 DIZZINESS: ICD-10-CM

## 2020-06-24 DIAGNOSIS — R06.02 SHORTNESS OF BREATH: ICD-10-CM

## 2020-06-24 LAB
ALBUMIN SERPL BCP-MCNC: 2.6 G/DL (ref 3.5–5.2)
ALP SERPL-CCNC: 86 U/L (ref 55–135)
ALT SERPL W/O P-5'-P-CCNC: 11 U/L (ref 10–44)
ANION GAP SERPL CALC-SCNC: 7 MMOL/L (ref 8–16)
AST SERPL-CCNC: 13 U/L (ref 10–40)
BILIRUB SERPL-MCNC: 0.2 MG/DL (ref 0.1–1)
BUN SERPL-MCNC: 25 MG/DL (ref 6–20)
CALCIUM SERPL-MCNC: 8.5 MG/DL (ref 8.7–10.5)
CHLORIDE SERPL-SCNC: 102 MMOL/L (ref 95–110)
CO2 SERPL-SCNC: 21 MMOL/L (ref 23–29)
CREAT SERPL-MCNC: 2.4 MG/DL (ref 0.5–1.4)
EST. GFR  (AFRICAN AMERICAN): 31.4 ML/MIN/1.73 M^2
EST. GFR  (NON AFRICAN AMERICAN): 27.2 ML/MIN/1.73 M^2
ESTIMATED AVG GLUCOSE: 326 MG/DL (ref 68–131)
GLUCOSE SERPL-MCNC: 608 MG/DL (ref 70–110)
HBA1C MFR BLD HPLC: 13 % (ref 4–5.6)
POTASSIUM SERPL-SCNC: 4.9 MMOL/L (ref 3.5–5.1)
PROT SERPL-MCNC: 6.6 G/DL (ref 6–8.4)
SODIUM SERPL-SCNC: 130 MMOL/L (ref 136–145)
TSH SERPL DL<=0.005 MIU/L-ACNC: 1.74 UIU/ML (ref 0.4–4)

## 2020-06-24 PROCEDURE — 99214 OFFICE O/P EST MOD 30 MIN: CPT | Mod: S$GLB,,, | Performed by: PHYSICIAN ASSISTANT

## 2020-06-24 PROCEDURE — 84443 ASSAY THYROID STIM HORMONE: CPT

## 2020-06-24 PROCEDURE — 36415 COLL VENOUS BLD VENIPUNCTURE: CPT

## 2020-06-24 PROCEDURE — 80053 COMPREHEN METABOLIC PANEL: CPT

## 2020-06-24 PROCEDURE — 99999 PR PBB SHADOW E&M-EST. PATIENT-LVL V: ICD-10-PCS | Mod: PBBFAC,,, | Performed by: PHYSICIAN ASSISTANT

## 2020-06-24 PROCEDURE — 99999 PR PBB SHADOW E&M-EST. PATIENT-LVL V: CPT | Mod: PBBFAC,,, | Performed by: PHYSICIAN ASSISTANT

## 2020-06-24 PROCEDURE — 83036 HEMOGLOBIN GLYCOSYLATED A1C: CPT

## 2020-06-24 PROCEDURE — 99214 PR OFFICE/OUTPT VISIT, EST, LEVL IV, 30-39 MIN: ICD-10-PCS | Mod: S$GLB,,, | Performed by: PHYSICIAN ASSISTANT

## 2020-06-24 RX ORDER — LOSARTAN POTASSIUM AND HYDROCHLOROTHIAZIDE 25; 100 MG/1; MG/1
1 TABLET ORAL DAILY
Qty: 90 TABLET | Refills: 0 | Status: SHIPPED | OUTPATIENT
Start: 2020-06-24 | End: 2020-08-17 | Stop reason: ALTCHOICE

## 2020-06-24 RX ORDER — METOPROLOL SUCCINATE 50 MG/1
50 TABLET, EXTENDED RELEASE ORAL DAILY
Qty: 90 TABLET | Refills: 0 | Status: SHIPPED | OUTPATIENT
Start: 2020-06-24 | End: 2020-08-11

## 2020-06-24 RX ORDER — CITALOPRAM 20 MG/1
20 TABLET, FILM COATED ORAL DAILY
Qty: 90 TABLET | Refills: 0 | Status: ON HOLD | OUTPATIENT
Start: 2020-06-24 | End: 2020-11-11 | Stop reason: HOSPADM

## 2020-06-25 ENCOUNTER — TELEPHONE (OUTPATIENT)
Dept: INTERNAL MEDICINE | Facility: CLINIC | Age: 25
End: 2020-06-25

## 2020-06-25 ENCOUNTER — HOSPITAL ENCOUNTER (OUTPATIENT)
Dept: RADIOLOGY | Facility: HOSPITAL | Age: 25
Discharge: HOME | End: 2020-06-25
Attending: NURSE PRACTITIONER
Payer: COMMERCIAL

## 2020-06-25 DIAGNOSIS — E10.22 TYPE 1 DIABETES MELLITUS WITH DIABETIC CHRONIC KIDNEY DISEASE, UNSPECIFIED CKD STAGE: Primary | ICD-10-CM

## 2020-06-25 DIAGNOSIS — R06.02 SHORTNESS OF BREATH: ICD-10-CM

## 2020-06-25 PROCEDURE — 71250 CT THORAX DX C-: CPT | Mod: TC

## 2020-06-25 PROCEDURE — 71250 CT CHEST WITHOUT CONTRAST: ICD-10-PCS | Mod: 26,,, | Performed by: RADIOLOGY

## 2020-06-25 PROCEDURE — 71250 CT THORAX DX C-: CPT | Mod: 26,,, | Performed by: RADIOLOGY

## 2020-06-25 NOTE — PROGRESS NOTES
Subjective:       Patient ID: Isabela Read is a 25 y.o. female.    Chief Complaint: Establish Care and Medication Refill    HPI     Former Established pt of Cb Mcghee MD (new to me)  Pt has appt with Dr. Van to establish care with new PCP in August.     PMH of HTN, uncontrolled Type I DM,  CKD3, Anxiety, Depression, and restrictive lung disease.     BP fairly normal today, Current on metoprolol, losartan. States she has been out of few meds for weeks. No cp, ha, vision changes or leg swelling but notes chronic intermittent dizziness for over the past year. Often occurring with moments of sob and prolonged exertion. She was recently seen in Pulmonology for further evaluation of her SOB, PFT with findings of restrictive lung disease. A CT Chest was ordered but postponed 2/2 COVID scheduling restriction.  She also notes chronic intermittent palpitations, state she  is on metoprolol for this.    She reports having an EKG and Echo a few years ago when previously followed at [facility].     Mood and anxiety are stable with Celexa. No SI/HI.    She follows with Endo for Type I DM, last A1c 8 months ago was >14%. No BG log today.     Hx of chronic diarrhea, recent NM gastric emptying test was negative for gastroparesis followed by GI. There were plans for c-scope but postponed 2/2 pandemic.       Past Medical History:   Diagnosis Date    Diabetes mellitus     Hypertension      Patient Active Problem List   Diagnosis    Chest pain    HTN (hypertension)    Microalbuminuria    Shortness of breath    Type 1 diabetes mellitus with diabetic chronic kidney disease    Ventricular bigeminy    Normocytic anemia    Bilateral lower extremity edema    Vitamin D deficiency    Mixed hyperlipidemia    Chronic kidney disease (CKD), stage III (moderate)         Review of patient's allergies indicates:  No Known Allergies      Social History     Tobacco Use    Smoking status: Never Smoker    Smokeless tobacco: Never  Used   Substance Use Topics    Alcohol use: No    Drug use: No         Review of Systems   Constitutional: Negative for chills, fever and unexpected weight change.   Respiratory: Positive for shortness of breath. Negative for cough and wheezing.    Cardiovascular: Negative for chest pain and leg swelling.   Gastrointestinal: Negative for abdominal pain, nausea and vomiting.   Integumentary:  Negative for rash.   Neurological: Positive for dizziness. Negative for weakness, light-headedness and headaches.         Objective: /80   Pulse (!) 57   Wt 63.9 kg (140 lb 14 oz)   SpO2 100%   BMI 22.74 kg/m²         Physical Exam  Vitals signs reviewed.   Constitutional:       General: She is not in acute distress.     Appearance: Normal appearance. She is well-developed. She is not ill-appearing.   HENT:      Head: Normocephalic and atraumatic.      Right Ear: Tympanic membrane, ear canal and external ear normal.      Left Ear: Tympanic membrane, ear canal and external ear normal.      Mouth/Throat:      Mouth: Mucous membranes are moist.      Pharynx: Oropharynx is clear.   Eyes:      Extraocular Movements: Extraocular movements intact.      Pupils: Pupils are equal, round, and reactive to light.   Cardiovascular:      Rate and Rhythm: Normal rate and regular rhythm.      Heart sounds: No murmur. No friction rub.   Pulmonary:      Effort: Pulmonary effort is normal.      Breath sounds: Normal breath sounds. No wheezing or rales.   Abdominal:      General: Bowel sounds are normal.      Palpations: Abdomen is soft.      Tenderness: There is no abdominal tenderness.   Musculoskeletal:      Right lower leg: No edema.      Left lower leg: No edema.   Lymphadenopathy:      Cervical: No cervical adenopathy.   Skin:     General: Skin is warm and dry.      Findings: No rash.   Neurological:      General: No focal deficit present.      Mental Status: She is alert.      Sensory: No sensory deficit.      Motor: No weakness.       Coordination: Coordination normal.      Gait: Gait normal.      Comments: Ambulating without difficulty.   Negative Lexi-HallPike  NO nystagmus  Finger to nose, heel to shin, tandem gait all intact.          Assessment:       1. Essential hypertension    2. Palpitations    3. Type 1 diabetes mellitus with diabetic chronic kidney disease, unspecified CKD stage    4. Anxiety and depression    5. Dizziness    6. Restrictive lung disease    7. Shortness of breath        Plan:         Isabela was seen today for establish care and medication refill.    Diagnoses and all orders for this visit:    Essential hypertension  -     metoprolol succinate (TOPROL-XL) 50 MG 24 hr tablet; Take 1 tablet (50 mg total) by mouth once daily.  -     losartan-hydrochlorothiazide 100-25 mg (HYZAAR) 100-25 mg per tablet; Take 1 tablet by mouth once daily.  -     Cancel: CBC auto differential; Future  -     Comprehensive metabolic panel; Future  -     TSH; Future    Palpitations  -     metoprolol succinate (TOPROL-XL) 50 MG 24 hr tablet; Take 1 tablet (50 mg total) by mouth once daily.  -     Ambulatory referral/consult to Cardiology; Future  -     Holter monitor - 48 hour; Future  -     Echo Color Flow Doppler? Yes; Future    Type 1 diabetes mellitus with diabetic chronic kidney disease, unspecified CKD stage  Uncontrolled  Has f/u scheduled with Endo  Will repeat A1c today  -     Ambulatory referral/consult to Optometry; Future    Anxiety and depression  -     citalopram (CELEXA) 20 MG tablet; Take 1 tablet (20 mg total) by mouth once daily.    Dizziness  Suspect multifactorial  No focal deficit on exam  Will obtain further workup with Holter/Echo/lab as above as pt notes occurs moslty with sob  -     Echo Color Flow Doppler? Yes; Future    Restrictive lung disease  Shortness of breath  Will have staff assist with rescheduling CT Chest   -     Echo Color Flow Doppler? Yes; Future      Mallory Saeed PA-C    Future Appointments   Date  Time Provider Department Center   6/25/2020 12:30 PM [facility] OIC-CT1 500 LB LIMIT [facility]   6/30/2020  3:00 PM HOLTER, EKG [facility] ARRHPRO Rory Johnson   7/2/2020 11:00 AM ECHO, MAIN CAMPUS [facility] ECHOSTR Rory Johnson   7/10/2020  2:30 PM Bouchra Givens MD [facility] CARDIO [facility]   7/15/2020  3:30 PM Meghan Cueva OD [facility] OPTOMCN [facility] PCW   8/10/2020  1:30 PM Remedios Van MD [facility] IM [facility]   10/8/2020  9:30 AM Luciana Mayo MD [facility] ENDODIA [facility]

## 2020-06-25 NOTE — TELEPHONE ENCOUNTER
Critical result obtained yesterday(Glucose 608) , handled by on call provider last night.      I called today to follow up with patient but no answer. Left VM with instructions to increase hydration, insulin compliance, need for f/u with Endo and Nephrology, and repeat BMP next week. I will have my staff attempt to reach patient again. .

## 2020-06-25 NOTE — TELEPHONE ENCOUNTER
Called patient regarding critical glucose level. She reports that she ate and took her insulin right before her labs were drawn today so she was expecting the glucose to be high. She reports her glucose before dinner today was 186. No evidence of DKA on labs and she is asymptomatic. Advised patient that she also has evidence of worsening renal function. Will refer to nephrology. Will notify Dr. Mayo.

## 2020-06-26 ENCOUNTER — TELEPHONE (OUTPATIENT)
Dept: NEPHROLOGY | Facility: CLINIC | Age: 25
End: 2020-06-26

## 2020-06-26 NOTE — TELEPHONE ENCOUNTER
Called to make an appointment for the PT. There's a referral in her chart. I tried to schedule it but nothing came up as available. Please call back     Callback: [phone]

## 2020-06-26 NOTE — TELEPHONE ENCOUNTER
Pt's glucose reading for this morning is 216. Pt states that she has taken insulin but has not eaten yet. Labs are scheduled, pt has been informed of your instructions and verbalized understanding. Endo appt has been made and Nephro will call the pt to schedule.

## 2020-07-14 ENCOUNTER — PATIENT OUTREACH (OUTPATIENT)
Dept: ADMINISTRATIVE | Facility: OTHER | Age: 25
End: 2020-07-14

## 2020-07-14 NOTE — PROGRESS NOTES
Chart reviewed.   Immunizations: updated  Orders placed: n/a  Upcoming appts to satisfy NUHA topics: Optometry 7/15

## 2020-07-28 ENCOUNTER — PATIENT OUTREACH (OUTPATIENT)
Dept: ADMINISTRATIVE | Facility: OTHER | Age: 25
End: 2020-07-28

## 2020-07-28 ENCOUNTER — TELEPHONE (OUTPATIENT)
Dept: OBSTETRICS AND GYNECOLOGY | Facility: CLINIC | Age: 25
End: 2020-07-28

## 2020-07-28 DIAGNOSIS — Z36.3 ENCOUNTER FOR ANTENATAL SCREENING FOR MALFORMATION USING ULTRASOUND: Primary | ICD-10-CM

## 2020-07-28 NOTE — PROGRESS NOTES
Care Everywhere: updated  Immunization:   Health Maintenance:   Media Review: reviewed for outside eye exam report  Legacy Review:   Order placed:   Upcoming appts:

## 2020-07-29 ENCOUNTER — OFFICE VISIT (OUTPATIENT)
Dept: OBSTETRICS AND GYNECOLOGY | Facility: CLINIC | Age: 25
End: 2020-07-29
Payer: COMMERCIAL

## 2020-07-29 ENCOUNTER — LAB VISIT (OUTPATIENT)
Dept: LAB | Facility: OTHER | Age: 25
End: 2020-07-29
Attending: OBSTETRICS & GYNECOLOGY
Payer: COMMERCIAL

## 2020-07-29 VITALS
BODY MASS INDEX: 26.24 KG/M2 | HEIGHT: 64 IN | SYSTOLIC BLOOD PRESSURE: 132 MMHG | WEIGHT: 153.69 LBS | DIASTOLIC BLOOD PRESSURE: 84 MMHG

## 2020-07-29 DIAGNOSIS — E10.9 TYPE 1 DIABETES MELLITUS WITHOUT COMPLICATION: ICD-10-CM

## 2020-07-29 DIAGNOSIS — E10.22 TYPE 1 DIABETES MELLITUS WITH DIABETIC CHRONIC KIDNEY DISEASE, UNSPECIFIED CKD STAGE: ICD-10-CM

## 2020-07-29 DIAGNOSIS — I10 ESSENTIAL HYPERTENSION: ICD-10-CM

## 2020-07-29 DIAGNOSIS — O99.011 ANEMIA DURING PREGNANCY IN FIRST TRIMESTER: Primary | ICD-10-CM

## 2020-07-29 DIAGNOSIS — N91.2 AMENORRHEA: Primary | ICD-10-CM

## 2020-07-29 DIAGNOSIS — I10 CHRONIC HYPERTENSION: ICD-10-CM

## 2020-07-29 DIAGNOSIS — N91.2 AMENORRHEA: ICD-10-CM

## 2020-07-29 PROBLEM — O99.019 ANEMIA IN PREGNANCY: Status: ACTIVE | Noted: 2020-07-29

## 2020-07-29 PROBLEM — N28.9 RENAL DISEASE: Status: ACTIVE | Noted: 2020-07-29

## 2020-07-29 LAB
ABO + RH BLD: NORMAL
ALBUMIN SERPL BCP-MCNC: 2.1 G/DL (ref 3.5–5.2)
ALP SERPL-CCNC: 69 U/L (ref 55–135)
ALT SERPL W/O P-5'-P-CCNC: 12 U/L (ref 10–44)
ANION GAP SERPL CALC-SCNC: 9 MMOL/L (ref 8–16)
AST SERPL-CCNC: 17 U/L (ref 10–40)
B-HCG UR QL: POSITIVE
BACTERIA #/AREA URNS AUTO: ABNORMAL /HPF
BILIRUB SERPL-MCNC: 0.2 MG/DL (ref 0.1–1)
BILIRUB UR QL STRIP: NEGATIVE
BLD GP AB SCN CELLS X3 SERPL QL: NORMAL
BUN SERPL-MCNC: 37 MG/DL (ref 6–20)
CALCIUM SERPL-MCNC: 8.5 MG/DL (ref 8.7–10.5)
CHLORIDE SERPL-SCNC: 106 MMOL/L (ref 95–110)
CLARITY UR REFRACT.AUTO: ABNORMAL
CO2 SERPL-SCNC: 18 MMOL/L (ref 23–29)
COLOR UR AUTO: YELLOW
CREAT SERPL-MCNC: 2.2 MG/DL (ref 0.5–1.4)
CTP QC/QA: YES
ERYTHROCYTE [DISTWIDTH] IN BLOOD BY AUTOMATED COUNT: 11.8 % (ref 11.5–14.5)
EST. GFR  (AFRICAN AMERICAN): 35 ML/MIN/1.73 M^2
EST. GFR  (NON AFRICAN AMERICAN): 30 ML/MIN/1.73 M^2
GLUCOSE SERPL-MCNC: 235 MG/DL (ref 70–110)
GLUCOSE UR QL STRIP: ABNORMAL
HCT VFR BLD AUTO: 22.2 % (ref 37–48.5)
HGB BLD-MCNC: 7.1 G/DL (ref 12–16)
HGB UR QL STRIP: NEGATIVE
HYALINE CASTS UR QL AUTO: 0 /LPF
KETONES UR QL STRIP: NEGATIVE
LEUKOCYTE ESTERASE UR QL STRIP: ABNORMAL
MCH RBC QN AUTO: 29.2 PG (ref 27–31)
MCHC RBC AUTO-ENTMCNC: 32 G/DL (ref 32–36)
MCV RBC AUTO: 91 FL (ref 82–98)
MICROSCOPIC COMMENT: ABNORMAL
NITRITE UR QL STRIP: NEGATIVE
PH UR STRIP: 7 [PH] (ref 5–8)
PLATELET # BLD AUTO: 399 K/UL (ref 150–350)
PMV BLD AUTO: 10.1 FL (ref 9.2–12.9)
POTASSIUM SERPL-SCNC: 4.9 MMOL/L (ref 3.5–5.1)
PROT SERPL-MCNC: 6.2 G/DL (ref 6–8.4)
PROT UR QL STRIP: ABNORMAL
RBC # BLD AUTO: 2.43 M/UL (ref 4–5.4)
RBC #/AREA URNS AUTO: 2 /HPF (ref 0–4)
SODIUM SERPL-SCNC: 133 MMOL/L (ref 136–145)
SP GR UR STRIP: 1.01 (ref 1–1.03)
SQUAMOUS #/AREA URNS AUTO: 1 /HPF
URN SPEC COLLECT METH UR: ABNORMAL
WBC # BLD AUTO: 7.23 K/UL (ref 3.9–12.7)
WBC #/AREA URNS AUTO: 17 /HPF (ref 0–5)

## 2020-07-29 PROCEDURE — 86762 RUBELLA ANTIBODY: CPT

## 2020-07-29 PROCEDURE — 85027 COMPLETE CBC AUTOMATED: CPT

## 2020-07-29 PROCEDURE — 87491 CHLMYD TRACH DNA AMP PROBE: CPT

## 2020-07-29 PROCEDURE — 81001 URINALYSIS AUTO W/SCOPE: CPT

## 2020-07-29 PROCEDURE — 82570 ASSAY OF URINE CREATININE: CPT

## 2020-07-29 PROCEDURE — 87086 URINE CULTURE/COLONY COUNT: CPT

## 2020-07-29 PROCEDURE — 99214 OFFICE O/P EST MOD 30 MIN: CPT | Mod: S$GLB,,, | Performed by: OBSTETRICS & GYNECOLOGY

## 2020-07-29 PROCEDURE — 86787 VARICELLA-ZOSTER ANTIBODY: CPT

## 2020-07-29 PROCEDURE — 99214 PR OFFICE/OUTPT VISIT, EST, LEVL IV, 30-39 MIN: ICD-10-PCS | Mod: S$GLB,,, | Performed by: OBSTETRICS & GYNECOLOGY

## 2020-07-29 PROCEDURE — 87340 HEPATITIS B SURFACE AG IA: CPT

## 2020-07-29 PROCEDURE — 83020 HEMOGLOBIN ELECTROPHORESIS: CPT

## 2020-07-29 PROCEDURE — 86592 SYPHILIS TEST NON-TREP QUAL: CPT

## 2020-07-29 PROCEDURE — 86850 RBC ANTIBODY SCREEN: CPT

## 2020-07-29 PROCEDURE — 99999 PR PBB SHADOW E&M-EST. PATIENT-LVL III: ICD-10-PCS | Mod: PBBFAC,,, | Performed by: OBSTETRICS & GYNECOLOGY

## 2020-07-29 PROCEDURE — 86703 HIV-1/HIV-2 1 RESULT ANTBDY: CPT

## 2020-07-29 PROCEDURE — 99999 PR PBB SHADOW E&M-EST. PATIENT-LVL III: CPT | Mod: PBBFAC,,, | Performed by: OBSTETRICS & GYNECOLOGY

## 2020-07-29 PROCEDURE — 36415 COLL VENOUS BLD VENIPUNCTURE: CPT

## 2020-07-29 PROCEDURE — 80053 COMPREHEN METABOLIC PANEL: CPT

## 2020-07-29 RX ORDER — HYDROCHLOROTHIAZIDE 25 MG/1
TABLET ORAL
COMMUNITY
Start: 2020-06-24 | End: 2020-09-16 | Stop reason: SDUPTHER

## 2020-07-29 RX ORDER — LOSARTAN POTASSIUM 100 MG/1
TABLET ORAL
COMMUNITY
Start: 2020-06-24 | End: 2020-08-17

## 2020-07-29 NOTE — PROGRESS NOTES
CC: Amenorrhea    HPI: Isabela Read is a 25 y.o. No obstetric history on file., presents today for amenorrhea. Has not seen any other provider for this possible pregnancy. No LMP recorded (lmp unknown)..      Not using contraception prior to pregnancy.      Feels well. She works for the [employer]. Partners name is [employer].    Carries a diagnosis of type 2 diabetes (Last A1C was 13.0). She takes lantus 20 units qAM and lispro 10 units with meals. She also has a history of CHTN, taking HCTZ 25 mg, losartan 100mg, daily and toprol XL 50 mg daily. Sees Dr. Mayo at [facility].     Prior to Admission medications    Medication Sig Start Date End Date Taking? Authorizing Provider   blood sugar diagnostic Strp To check BG 4 - 6 times daily, to use with insurance preferred meter 5/1/19  Yes Luciana Mayo MD   blood-glucose meter kit To check BG 4 times daily, to use with insurance preferred meter 5/1/19  Yes Luciana Mayo MD   citalopram (CELEXA) 20 MG tablet Take 1 tablet (20 mg total) by mouth once daily. 6/24/20  Yes Mallory Saeed PA-C   flash glucose scanning reader (FREESTYLE MARCY 14 DAY READER) Misc 1 each by Misc.(Non-Drug; Combo Route) route once daily. 10/23/19  Yes Luciana Mayo MD   flash glucose sensor (FREESTYLE MARCY 14 DAY SENSOR) Kit Route 1 every fourteen (fourteen ) days. 10/24/19  Yes Luciana Mayo MD   fluocinolone and shower cap (DERMA-SMOOTHE/FS SCALP OIL) 0.01 % Oil Apply oil to damp scalp nightly and cover with shower cap. 10/2/19  Yes Destiny West PA-C   hydroCHLOROthiazide (HYDRODIURIL) 25 MG tablet  6/24/20  Yes Historical Provider, MD   insulin (LANTUS SOLOSTAR U-100 INSULIN) glargine 100 units/mL (3mL) SubQ pen Inject 20 Units into the skin every evening 1/27/20  Yes Dane Moody MD   insulin lispro (HUMALOG KWIKPEN INSULIN) 100 unit/mL pen Inject 10 units into skin the skin 3 three times daily with meals 6/2/20  Yes Luciana Mayo MD   ketoconazole (NIZORAL) 2 % shampoo  "Wash hair with medicated shampoo at least 2x/week - let sit on scalp at least 5 minutes prior to rinsing 10/2/19  Yes Destiny West PA-C   lancets Misc To check BG 4 - 6 times daily, to use with insurance preferred meter 19  Yes Luciana Mayo MD   losartan (COZAAR) 100 MG tablet  20  Yes Historical Provider, MD   metoprolol succinate (TOPROL-XL) 50 MG 24 hr tablet Take 1 tablet (50 mg total) by mouth once daily. 20 Yes Mallory Saeed PA-C   pen needle, diabetic 32 gauge x " Ndle For three times daily injection 19  Yes Luciana Mayo MD   albuterol (VENTOLIN HFA) 90 mcg/actuation inhaler Inhale into the lungs.    Historical Provider, MD   ferrous gluconate (FERGON) 324 MG tablet Take 324 mg by mouth daily with breakfast.    Historical Provider, MD   losartan-hydrochlorothiazide 100-25 mg (HYZAAR) 100-25 mg per tablet Take 1 tablet by mouth once daily.  Patient not taking: Reported on 2020  Mallory Saeed PA-C   norethindrone (ORTHO MICRONOR) 0.35 mg tablet Take 1 tablet (0.35 mg total) by mouth once daily.  Patient not taking: Reported on 19  Marion Jeffrey MD         GYN History:  Last pap: 2019/NILM  Denies history of abnormal pap smears.   Denies History of hepatitis B, C, HIV, syphilis, gonorrhea/chlamydia.   Denies History of genital HSV  Cycles are irregular     Review of patient's allergies indicates:  No Known Allergies  Past Medical History:   Diagnosis Date    Diabetes mellitus     Hypertension      History reviewed. No pertinent surgical history.  History reviewed. No pertinent surgical history.  OB History    Para Term  AB Living   1             SAB TAB Ectopic Multiple Live Births                  # Outcome Date GA Lbr Chalo/2nd Weight Sex Delivery Anes PTL Lv   1               OB History        1    Para        Term                AB        Living           SAB        TAB     "    Ectopic        Multiple        Live Births                   Social History     Socioeconomic History    Marital status: Single     Spouse name: Not on file    Number of children: Not on file    Years of education: Not on file    Highest education level: Not on file   Occupational History    Occupation:  at [employer]   Social Needs    Financial resource strain: Not on file    Food insecurity     Worry: Not on file     Inability: Not on file    Transportation needs     Medical: Not on file     Non-medical: Not on file   Tobacco Use    Smoking status: Never Smoker    Smokeless tobacco: Never Used   Substance and Sexual Activity    Alcohol use: No    Drug use: No    Sexual activity: Yes     Partners: Male     Comment: monogamous   Lifestyle    Physical activity     Days per week: Not on file     Minutes per session: Not on file    Stress: Not on file   Relationships    Social connections     Talks on phone: Not on file     Gets together: Not on file     Attends [employer] service: Not on file     Active member of club or organization: Not on file     Attends meetings of clubs or organizations: Not on file     Relationship status: Not on file   Other Topics Concern    Not on file   Social History Narrative    Not on file     Family History   Problem Relation Age of Onset    Heart disease Father      Social History     Substance and Sexual Activity   Sexual Activity Yes    Partners: Male    Comment: monogamous     ROS:    Constitutional/Gen: Denies fevers, chills, malaise, or weight loss.  Psych: Denies depression, anxiety  Eyes: Denies changes in vision or scotomata  Ears, nose, mouth, throat: Denies sinus tenderness, swelling, or dentition problems  CV/vasc: Denies heart palpitations or edema  Resp: Denies SOB or dyspnea  GI: Denies constipation, diarrhea, or vomiting  : Denies vaginal discharge, dysuria or pelvic pain  MS: Denies weakness, soreness, or changes in ROM    OBJECTIVE:  BP  "132/84   Ht 5' 4" (1.626 m)   Wt 69.7 kg (153 lb 10.6 oz)   LMP  (LMP Unknown) Comment: pt. states last cycle was in May. She is not sure of the exact date.  BMI 26.38 kg/m²   Constitutional/Gen: NAD  Head: normocephalic  Skin: warm and dry w/o rash  Lung: No increased work of breathing  Heart: Normal heart rate  Abdomen: soft, non tender, no hernia   Pelvic exam: Deferred  Extremities: normal ROM  Neurologic: A&O x 4  Psych: affect appropriate and without signs of mood, thought or memory difficulty appreciated    UPT positive in office    Bedside US confirms SIUP with cardiac activity     ASSESSMENT: 25 y.o. female  here for amenorrhea.    PLAN:  1. Amenorrhea  -- + UPT in office, No LMP recorded (lmp unknown). --> Estimated Date of Delivery: None noted.  -- Dating US ordered and scheduled  -- Routine serum and urine prenatal labs today     2. Routine Gyn care  -- Pap up to date    3. Pregnancy education and counseling. Handouts and booklet provided    Problem Noted   Htn (Hypertension) 2015    Currently on HCTZ 25 mg, losartan 100 mg and toprol 50 mg XR  At NOB visit, we discussed increasing HCTZ to 50 mg daily, continuing toprol and stopping the losartan  #Baseline labs, EKG ordered  #Had prior cardiology appointment that was missed with echo, needs to be rescheduled  #ASA at 12-16 weeks       Type 1 Diabetes Mellitus With Diabetic Chronic Kidney Disease 2015    Followed by Dr. Mayo.   Last A1C was 13   Currently on lantus 20 units qAM and humolog 10 units with meals  Repeat A1C, labs pending today    #Referral to MFM       See me next week, same day as MFM.  Discuss genetic screening at next visit.     Marion Jeffrey MD  Obstetrics and Gynecology  [facility]          "

## 2020-07-30 LAB
BACTERIA UR CULT: NO GROWTH
CREAT UR-MCNC: 58 MG/DL (ref 15–325)
HBV SURFACE AG SERPL QL IA: NEGATIVE
HIV 1+2 AB+HIV1 P24 AG SERPL QL IA: NEGATIVE
PROT UR-MCNC: 457 MG/DL (ref 0–15)
PROT/CREAT UR: 7.88 MG/G{CREAT} (ref 0–0.2)
RPR SER QL: NORMAL
RUBV IGG SER-ACNC: 26.7 IU/ML
RUBV IGG SER-IMP: REACTIVE

## 2020-07-31 LAB
VARICELLA INTERPRETATION: POSITIVE
VARICELLA ZOSTER IGG: 3.85 ISR (ref 0–0.9)

## 2020-08-03 LAB
HGB A MFR BLD ELPH: 97.6 % (ref 95.8–98)
HGB A2 MFR BLD: 2.4 % (ref 2–3.3)
HGB F MFR BLD: 0 % (ref 0–0.9)
HGB FRACT BLD ELPH-IMP: NORMAL
HGB OTHER MFR BLD ELPH: 0 %
THEVP VARIANT 2: NORMAL
THEVP VARIANT 3: NORMAL

## 2020-08-04 ENCOUNTER — HOSPITAL ENCOUNTER (INPATIENT)
Facility: OTHER | Age: 25
LOS: 2 days | Discharge: HOME | DRG: 832 | End: 2020-08-06
Attending: OBSTETRICS & GYNECOLOGY | Admitting: OBSTETRICS & GYNECOLOGY
Payer: COMMERCIAL

## 2020-08-04 ENCOUNTER — INITIAL CONSULT (OUTPATIENT)
Dept: MATERNAL FETAL MEDICINE | Facility: CLINIC | Age: 25
End: 2020-08-04
Payer: COMMERCIAL

## 2020-08-04 ENCOUNTER — PROCEDURE VISIT (OUTPATIENT)
Dept: MATERNAL FETAL MEDICINE | Facility: CLINIC | Age: 25
End: 2020-08-04
Payer: COMMERCIAL

## 2020-08-04 ENCOUNTER — DOCUMENTATION ONLY (OUTPATIENT)
Dept: OBSTETRICS AND GYNECOLOGY | Facility: HOSPITAL | Age: 25
End: 2020-08-04

## 2020-08-04 VITALS
WEIGHT: 154.56 LBS | BODY MASS INDEX: 26.53 KG/M2 | SYSTOLIC BLOOD PRESSURE: 146 MMHG | DIASTOLIC BLOOD PRESSURE: 104 MMHG

## 2020-08-04 DIAGNOSIS — E10.22 TYPE 1 DIABETES MELLITUS WITH DIABETIC CHRONIC KIDNEY DISEASE, UNSPECIFIED CKD STAGE: ICD-10-CM

## 2020-08-04 DIAGNOSIS — J98.4 RESTRICTIVE LUNG DISEASE: ICD-10-CM

## 2020-08-04 DIAGNOSIS — R06.09 DYSPNEA ON EXERTION: ICD-10-CM

## 2020-08-04 DIAGNOSIS — E10.22 TYPE 1 DIABETES MELLITUS WITH STAGE 3 CHRONIC KIDNEY DISEASE: Primary | ICD-10-CM

## 2020-08-04 DIAGNOSIS — O10.919 CHRONIC HYPERTENSION IN PREGNANCY: ICD-10-CM

## 2020-08-04 DIAGNOSIS — N18.30 TYPE 1 DIABETES MELLITUS WITH STAGE 3 CHRONIC KIDNEY DISEASE: Primary | ICD-10-CM

## 2020-08-04 DIAGNOSIS — N91.2 AMENORRHEA: ICD-10-CM

## 2020-08-04 DIAGNOSIS — N18.30 CHRONIC KIDNEY DISEASE (CKD), STAGE III (MODERATE): ICD-10-CM

## 2020-08-04 DIAGNOSIS — I10 CHRONIC HYPERTENSION: ICD-10-CM

## 2020-08-04 DIAGNOSIS — D64.9 NORMOCYTIC ANEMIA: ICD-10-CM

## 2020-08-04 DIAGNOSIS — O10.919 CHRONIC HYPERTENSION AFFECTING PREGNANCY: ICD-10-CM

## 2020-08-04 DIAGNOSIS — O99.011 ANEMIA DURING PREGNANCY IN FIRST TRIMESTER: ICD-10-CM

## 2020-08-04 DIAGNOSIS — E11.65 POORLY CONTROLLED DIABETES MELLITUS: Primary | ICD-10-CM

## 2020-08-04 DIAGNOSIS — E10.9 TYPE 1 DIABETES MELLITUS WITHOUT COMPLICATION: ICD-10-CM

## 2020-08-04 DIAGNOSIS — N28.9 RENAL DISEASE: ICD-10-CM

## 2020-08-04 LAB
ALBUMIN SERPL BCP-MCNC: 1.9 G/DL (ref 3.5–5.2)
ALBUMIN SERPL BCP-MCNC: 2.2 G/DL (ref 3.5–5.2)
ALP SERPL-CCNC: 78 U/L (ref 55–135)
ALP SERPL-CCNC: 85 U/L (ref 55–135)
ALT SERPL W/O P-5'-P-CCNC: 22 U/L (ref 10–44)
ALT SERPL W/O P-5'-P-CCNC: 23 U/L (ref 10–44)
ANION GAP SERPL CALC-SCNC: 10 MMOL/L (ref 8–16)
ANION GAP SERPL CALC-SCNC: 8 MMOL/L (ref 8–16)
AST SERPL-CCNC: 15 U/L (ref 10–40)
AST SERPL-CCNC: 16 U/L (ref 10–40)
BASOPHILS # BLD AUTO: 0.03 K/UL (ref 0–0.2)
BASOPHILS NFR BLD: 0.3 % (ref 0–1.9)
BILIRUB SERPL-MCNC: 0.1 MG/DL (ref 0.1–1)
BILIRUB SERPL-MCNC: 0.2 MG/DL (ref 0.1–1)
BUN SERPL-MCNC: 31 MG/DL (ref 6–20)
BUN SERPL-MCNC: 33 MG/DL (ref 6–20)
C TRACH DNA SPEC QL NAA+PROBE: NOT DETECTED
CALCIUM SERPL-MCNC: 7.9 MG/DL (ref 8.7–10.5)
CALCIUM SERPL-MCNC: 8.6 MG/DL (ref 8.7–10.5)
CHLORIDE SERPL-SCNC: 102 MMOL/L (ref 95–110)
CHLORIDE SERPL-SCNC: 104 MMOL/L (ref 95–110)
CO2 SERPL-SCNC: 18 MMOL/L (ref 23–29)
CO2 SERPL-SCNC: 21 MMOL/L (ref 23–29)
CREAT SERPL-MCNC: 2.1 MG/DL (ref 0.5–1.4)
CREAT SERPL-MCNC: 2.3 MG/DL (ref 0.5–1.4)
DIFFERENTIAL METHOD: ABNORMAL
EOSINOPHIL # BLD AUTO: 0.1 K/UL (ref 0–0.5)
EOSINOPHIL NFR BLD: 0.7 % (ref 0–8)
ERYTHROCYTE [DISTWIDTH] IN BLOOD BY AUTOMATED COUNT: 11.8 % (ref 11.5–14.5)
EST. GFR  (AFRICAN AMERICAN): 33 ML/MIN/1.73 M^2
EST. GFR  (AFRICAN AMERICAN): 37 ML/MIN/1.73 M^2
EST. GFR  (NON AFRICAN AMERICAN): 29 ML/MIN/1.73 M^2
EST. GFR  (NON AFRICAN AMERICAN): 32 ML/MIN/1.73 M^2
GLUCOSE SERPL-MCNC: 267 MG/DL (ref 70–110)
GLUCOSE SERPL-MCNC: 604 MG/DL (ref 70–110)
HCT VFR BLD AUTO: 22.8 % (ref 37–48.5)
HGB BLD-MCNC: 7.5 G/DL (ref 12–16)
IMM GRANULOCYTES # BLD AUTO: 0.02 K/UL (ref 0–0.04)
IMM GRANULOCYTES NFR BLD AUTO: 0.2 % (ref 0–0.5)
LYMPHOCYTES # BLD AUTO: 2.9 K/UL (ref 1–4.8)
LYMPHOCYTES NFR BLD: 31 % (ref 18–48)
MCH RBC QN AUTO: 29 PG (ref 27–31)
MCHC RBC AUTO-ENTMCNC: 32.9 G/DL (ref 32–36)
MCV RBC AUTO: 88 FL (ref 82–98)
MONOCYTES # BLD AUTO: 0.7 K/UL (ref 0.3–1)
MONOCYTES NFR BLD: 7.5 % (ref 4–15)
N GONORRHOEA DNA SPEC QL NAA+PROBE: NOT DETECTED
NEUTROPHILS # BLD AUTO: 5.7 K/UL (ref 1.8–7.7)
NEUTROPHILS NFR BLD: 60.3 % (ref 38–73)
NRBC BLD-RTO: 0 /100 WBC
PLATELET # BLD AUTO: 373 K/UL (ref 150–350)
PMV BLD AUTO: 10.1 FL (ref 9.2–12.9)
POCT GLUCOSE: 245 MG/DL (ref 70–110)
POCT GLUCOSE: 310 MG/DL (ref 70–110)
POCT GLUCOSE: 466 MG/DL (ref 70–110)
POCT GLUCOSE: 469 MG/DL (ref 70–110)
POCT GLUCOSE: >500 MG/DL (ref 70–110)
POTASSIUM SERPL-SCNC: 4.3 MMOL/L (ref 3.5–5.1)
POTASSIUM SERPL-SCNC: 4.3 MMOL/L (ref 3.5–5.1)
PROT SERPL-MCNC: 5.3 G/DL (ref 6–8.4)
PROT SERPL-MCNC: 6.1 G/DL (ref 6–8.4)
RBC # BLD AUTO: 2.59 M/UL (ref 4–5.4)
SARS-COV-2 RDRP RESP QL NAA+PROBE: NEGATIVE
SODIUM SERPL-SCNC: 130 MMOL/L (ref 136–145)
SODIUM SERPL-SCNC: 133 MMOL/L (ref 136–145)
TSH SERPL DL<=0.005 MIU/L-ACNC: 3.79 UIU/ML (ref 0.4–4)
WBC # BLD AUTO: 9.38 K/UL (ref 3.9–12.7)

## 2020-08-04 PROCEDURE — U0002 COVID-19 LAB TEST NON-CDC: HCPCS

## 2020-08-04 PROCEDURE — 94761 N-INVAS EAR/PLS OXIMETRY MLT: CPT

## 2020-08-04 PROCEDURE — 99900035 HC TECH TIME PER 15 MIN (STAT)

## 2020-08-04 PROCEDURE — 99244 PR OFFICE CONSULTATION,LEVEL IV: ICD-10-PCS | Mod: 25,S$GLB,, | Performed by: OBSTETRICS & GYNECOLOGY

## 2020-08-04 PROCEDURE — 99999 PR PBB SHADOW E&M-EST. PATIENT-LVL III: CPT | Mod: PBBFAC,,, | Performed by: OBSTETRICS & GYNECOLOGY

## 2020-08-04 PROCEDURE — 76801 PR US, OB <14WKS, TRANSABD, SINGLE GESTATION: ICD-10-PCS | Mod: S$GLB,,, | Performed by: OBSTETRICS & GYNECOLOGY

## 2020-08-04 PROCEDURE — 82728 ASSAY OF FERRITIN: CPT

## 2020-08-04 PROCEDURE — 99999 PR PBB SHADOW E&M-EST. PATIENT-LVL III: ICD-10-PCS | Mod: PBBFAC,,, | Performed by: OBSTETRICS & GYNECOLOGY

## 2020-08-04 PROCEDURE — 63600175 PHARM REV CODE 636 W HCPCS: Performed by: STUDENT IN AN ORGANIZED HEALTH CARE EDUCATION/TRAINING PROGRAM

## 2020-08-04 PROCEDURE — 93010 ELECTROCARDIOGRAM REPORT: CPT | Mod: ,,, | Performed by: INTERNAL MEDICINE

## 2020-08-04 PROCEDURE — 25000003 PHARM REV CODE 250: Performed by: STUDENT IN AN ORGANIZED HEALTH CARE EDUCATION/TRAINING PROGRAM

## 2020-08-04 PROCEDURE — 11000001 HC ACUTE MED/SURG PRIVATE ROOM

## 2020-08-04 PROCEDURE — 93010 EKG 12-LEAD: ICD-10-PCS | Mod: ,,, | Performed by: INTERNAL MEDICINE

## 2020-08-04 PROCEDURE — 99244 OFF/OP CNSLTJ NEW/EST MOD 40: CPT | Mod: 25,S$GLB,, | Performed by: OBSTETRICS & GYNECOLOGY

## 2020-08-04 PROCEDURE — 76801 OB US < 14 WKS SINGLE FETUS: CPT | Mod: S$GLB,,, | Performed by: OBSTETRICS & GYNECOLOGY

## 2020-08-04 PROCEDURE — 85025 COMPLETE CBC W/AUTO DIFF WBC: CPT

## 2020-08-04 PROCEDURE — 84443 ASSAY THYROID STIM HORMONE: CPT

## 2020-08-04 PROCEDURE — 80053 COMPREHEN METABOLIC PANEL: CPT | Mod: 91

## 2020-08-04 PROCEDURE — 36415 COLL VENOUS BLD VENIPUNCTURE: CPT

## 2020-08-04 PROCEDURE — 93005 ELECTROCARDIOGRAM TRACING: CPT

## 2020-08-04 PROCEDURE — 83540 ASSAY OF IRON: CPT

## 2020-08-04 PROCEDURE — C9399 UNCLASSIFIED DRUGS OR BIOLOG: HCPCS | Performed by: STUDENT IN AN ORGANIZED HEALTH CARE EDUCATION/TRAINING PROGRAM

## 2020-08-04 RX ORDER — ACETAMINOPHEN 325 MG/1
650 TABLET ORAL EVERY 6 HOURS PRN
Status: DISCONTINUED | OUTPATIENT
Start: 2020-08-04 | End: 2020-08-06 | Stop reason: HOSPADM

## 2020-08-04 RX ORDER — LABETALOL HYDROCHLORIDE 5 MG/ML
INJECTION, SOLUTION INTRAVENOUS
Status: DISPENSED
Start: 2020-08-04 | End: 2020-08-05

## 2020-08-04 RX ORDER — PRENATAL WITH FERROUS FUM AND FOLIC ACID 3080; 920; 120; 400; 22; 1.84; 3; 20; 10; 1; 12; 200; 27; 25; 2 [IU]/1; [IU]/1; MG/1; [IU]/1; MG/1; MG/1; MG/1; MG/1; MG/1; MG/1; UG/1; MG/1; MG/1; MG/1; MG/1
1 TABLET ORAL DAILY
Status: DISCONTINUED | OUTPATIENT
Start: 2020-08-05 | End: 2020-08-06 | Stop reason: HOSPADM

## 2020-08-04 RX ORDER — HYDROCHLOROTHIAZIDE 25 MG/1
25 TABLET ORAL DAILY
Status: DISCONTINUED | OUTPATIENT
Start: 2020-08-05 | End: 2020-08-04

## 2020-08-04 RX ORDER — DIPHENHYDRAMINE HCL 25 MG
25 CAPSULE ORAL EVERY 4 HOURS PRN
Status: DISCONTINUED | OUTPATIENT
Start: 2020-08-04 | End: 2020-08-06 | Stop reason: HOSPADM

## 2020-08-04 RX ORDER — SIMETHICONE 80 MG
1 TABLET,CHEWABLE ORAL EVERY 6 HOURS PRN
Status: DISCONTINUED | OUTPATIENT
Start: 2020-08-04 | End: 2020-08-06 | Stop reason: HOSPADM

## 2020-08-04 RX ORDER — LABETALOL HYDROCHLORIDE 5 MG/ML
10 INJECTION, SOLUTION INTRAVENOUS ONCE
Status: COMPLETED | OUTPATIENT
Start: 2020-08-04 | End: 2020-08-04

## 2020-08-04 RX ORDER — AMOXICILLIN 250 MG
1 CAPSULE ORAL NIGHTLY PRN
Status: DISCONTINUED | OUTPATIENT
Start: 2020-08-04 | End: 2020-08-06 | Stop reason: HOSPADM

## 2020-08-04 RX ORDER — INSULIN ASPART 100 [IU]/ML
5 INJECTION, SOLUTION INTRAVENOUS; SUBCUTANEOUS
Status: DISCONTINUED | OUTPATIENT
Start: 2020-08-04 | End: 2020-08-04

## 2020-08-04 RX ORDER — SODIUM CHLORIDE 9 MG/ML
INJECTION, SOLUTION INTRAVENOUS CONTINUOUS
Status: DISCONTINUED | OUTPATIENT
Start: 2020-08-04 | End: 2020-08-05

## 2020-08-04 RX ORDER — NIFEDIPINE 30 MG/1
30 TABLET, EXTENDED RELEASE ORAL NIGHTLY
Status: DISCONTINUED | OUTPATIENT
Start: 2020-08-04 | End: 2020-08-06 | Stop reason: HOSPADM

## 2020-08-04 RX ORDER — INSULIN ASPART 100 [IU]/ML
5 INJECTION, SOLUTION INTRAVENOUS; SUBCUTANEOUS ONCE
Status: COMPLETED | OUTPATIENT
Start: 2020-08-04 | End: 2020-08-04

## 2020-08-04 RX ORDER — ONDANSETRON 8 MG/1
8 TABLET, ORALLY DISINTEGRATING ORAL EVERY 8 HOURS PRN
Status: DISCONTINUED | OUTPATIENT
Start: 2020-08-04 | End: 2020-08-06 | Stop reason: HOSPADM

## 2020-08-04 RX ORDER — DIPHENHYDRAMINE HYDROCHLORIDE 50 MG/ML
25 INJECTION INTRAMUSCULAR; INTRAVENOUS EVERY 4 HOURS PRN
Status: DISCONTINUED | OUTPATIENT
Start: 2020-08-04 | End: 2020-08-06 | Stop reason: HOSPADM

## 2020-08-04 RX ORDER — ALBUTEROL SULFATE 90 UG/1
2 AEROSOL, METERED RESPIRATORY (INHALATION) EVERY 6 HOURS PRN
Status: DISCONTINUED | OUTPATIENT
Start: 2020-08-04 | End: 2020-08-06 | Stop reason: HOSPADM

## 2020-08-04 RX ORDER — CITALOPRAM 20 MG/1
20 TABLET, FILM COATED ORAL DAILY
Status: DISCONTINUED | OUTPATIENT
Start: 2020-08-05 | End: 2020-08-06 | Stop reason: HOSPADM

## 2020-08-04 RX ORDER — HYDROCHLOROTHIAZIDE 25 MG/1
25 TABLET ORAL DAILY
Status: DISCONTINUED | OUTPATIENT
Start: 2020-08-04 | End: 2020-08-06 | Stop reason: HOSPADM

## 2020-08-04 RX ORDER — INSULIN ASPART 100 [IU]/ML
10 INJECTION, SOLUTION INTRAVENOUS; SUBCUTANEOUS
Status: DISCONTINUED | OUTPATIENT
Start: 2020-08-05 | End: 2020-08-05

## 2020-08-04 RX ORDER — NIFEDIPINE 30 MG/1
30 TABLET, EXTENDED RELEASE ORAL DAILY
Status: DISCONTINUED | OUTPATIENT
Start: 2020-08-05 | End: 2020-08-04

## 2020-08-04 RX ORDER — LABETALOL HYDROCHLORIDE 5 MG/ML
40 INJECTION, SOLUTION INTRAVENOUS ONCE
Status: COMPLETED | OUTPATIENT
Start: 2020-08-04 | End: 2020-08-04

## 2020-08-04 RX ADMIN — HYDROCHLOROTHIAZIDE 25 MG: 25 TABLET ORAL at 08:08

## 2020-08-04 RX ADMIN — LABETALOL HYDROCHLORIDE 20 MG: 5 INJECTION, SOLUTION INTRAVENOUS at 06:08

## 2020-08-04 RX ADMIN — LABETALOL HYDROCHLORIDE 40 MG: 5 INJECTION, SOLUTION INTRAVENOUS at 07:08

## 2020-08-04 RX ADMIN — INSULIN DETEMIR 20 UNITS: 100 INJECTION, SOLUTION SUBCUTANEOUS at 09:08

## 2020-08-04 RX ADMIN — INSULIN ASPART 10 UNITS: 100 INJECTION, SOLUTION INTRAVENOUS; SUBCUTANEOUS at 07:08

## 2020-08-04 RX ADMIN — INSULIN ASPART 5 UNITS: 100 INJECTION, SOLUTION INTRAVENOUS; SUBCUTANEOUS at 10:08

## 2020-08-04 RX ADMIN — NIFEDIPINE 30 MG: 30 TABLET, FILM COATED, EXTENDED RELEASE ORAL at 07:08

## 2020-08-04 RX ADMIN — INSULIN ASPART 5 UNITS: 100 INJECTION, SOLUTION INTRAVENOUS; SUBCUTANEOUS at 11:08

## 2020-08-04 RX ADMIN — METOPROLOL SUCCINATE 50 MG: 25 TABLET, EXTENDED RELEASE ORAL at 09:08

## 2020-08-04 NOTE — ASSESSMENT & PLAN NOTE
- patient with chronic hypertension   - previously on losartan-hctz combination pill however was told recently to stop   - she has taken no medications today   - BP on admit 200s/100s sustained   - IV antihypertensive administered   - oral metoprolol, hctz and procardia 30 initiated for BP control   - will likely need continue titration   - nephrology consulted; appreciate recommendations

## 2020-08-04 NOTE — SUBJECTIVE & OBJECTIVE
Obstetric HPI:  Currently feeling well with no complaints. Denies any bleeding or abdominal pain.     OB History    Para Term  AB Living   1 0 0 0 0 0   SAB TAB Ectopic Multiple Live Births   0 0 0 0 0      # Outcome Date GA Lbr Chalo/2nd Weight Sex Delivery Anes PTL Lv   1 Current              Past Medical History:   Diagnosis Date    Anemia     Chronic kidney disease     Diabetes mellitus     Hypertension     Restrictive lung disease      No past surgical history on file.    PTA Medications   Medication Sig    albuterol (VENTOLIN HFA) 90 mcg/actuation inhaler Inhale into the lungs.    blood sugar diagnostic Strp To check BG 4 - 6 times daily, to use with insurance preferred meter    blood-glucose meter kit To check BG 4 times daily, to use with insurance preferred meter    citalopram (CELEXA) 20 MG tablet Take 1 tablet (20 mg total) by mouth once daily.    ferrous gluconate (FERGON) 324 MG tablet Take 324 mg by mouth daily with breakfast.    flash glucose scanning reader (FREESTYLE MARCY 14 DAY READER) Misc 1 each by Misc.(Non-Drug; Combo Route) route once daily.    flash glucose sensor (FREESTYLE MARCY 14 DAY SENSOR) Kit Route 1 every fourteen (fourteen ) days.    fluocinolone and shower cap (DERMA-SMOOTHE/FS SCALP OIL) 0.01 % Oil Apply oil to damp scalp nightly and cover with shower cap. (Patient not taking: Reported on 2020)    hydroCHLOROthiazide (HYDRODIURIL) 25 MG tablet     insulin (LANTUS SOLOSTAR U-100 INSULIN) glargine 100 units/mL (3mL) SubQ pen Inject 20 Units into the skin every evening    insulin lispro (HUMALOG KWIKPEN INSULIN) 100 unit/mL pen Inject 10 units into skin the skin 3 three times daily with meals    ketoconazole (NIZORAL) 2 % shampoo Wash hair with medicated shampoo at least 2x/week - let sit on scalp at least 5 minutes prior to rinsing (Patient not taking: Reported on 2020)    lancets Misc To check BG 4 - 6 times daily, to use with insurance  "preferred meter    losartan (COZAAR) 100 MG tablet     losartan-hydrochlorothiazide 100-25 mg (HYZAAR) 100-25 mg per tablet Take 1 tablet by mouth once daily. (Patient not taking: Reported on 7/29/2020)    metoprolol succinate (TOPROL-XL) 50 MG 24 hr tablet Take 1 tablet (50 mg total) by mouth once daily.    norethindrone (ORTHO MICRONOR) 0.35 mg tablet Take 1 tablet (0.35 mg total) by mouth once daily. (Patient not taking: Reported on 7/29/2020)    pen needle, diabetic 32 gauge x 5/32" Ndle For three times daily injection (Patient not taking: Reported on 8/4/2020)       Review of patient's allergies indicates:  No Known Allergies     Family History     Problem Relation (Age of Onset)    Heart disease Father        Tobacco Use    Smoking status: Never Smoker    Smokeless tobacco: Never Used   Substance and Sexual Activity    Alcohol use: No    Drug use: No    Sexual activity: Yes     Partners: Male     Comment: monogamous     Review of Systems   Constitutional: Negative for activity change, appetite change, chills and fever.   Eyes: Negative for visual disturbance.   Respiratory: Negative for cough and shortness of breath.    Cardiovascular: Negative for chest pain.   Gastrointestinal: Negative for abdominal pain, constipation, nausea and vomiting.   Genitourinary: Negative for dysuria, frequency, hematuria, pelvic pain, urgency, vaginal bleeding, vaginal discharge and vaginal odor.   Musculoskeletal: Negative for myalgias.   Integumentary:  Negative for rash.   Neurological: Negative for headaches.   Psychiatric/Behavioral: Negative for depression and sleep disturbance. The patient is not nervous/anxious.       Objective:     Vital Signs (Most Recent):    Vital Signs (24h Range):  BP: (146)/(104) 146/104        There is no height or weight on file to calculate BMI.    Physical Exam:   Constitutional: She is oriented to person, place, and time. She appears well-developed and well-nourished. No distress.  "   HENT:   Head: Normocephalic and atraumatic.   Nose: Nose normal.    Eyes: Conjunctivae are normal. Right eye exhibits no discharge. Left eye exhibits no discharge. No scleral icterus.    Neck: Normal range of motion. Neck supple.    Cardiovascular: Normal rate, regular rhythm, normal heart sounds and intact distal pulses.  Exam reveals no clubbing and no edema.     Pulmonary/Chest: Effort normal and breath sounds normal. No respiratory distress. She has no wheezes.        Abdominal: Soft. She exhibits no mass. There is no abdominal tenderness. There is no rebound.             Musculoskeletal: Normal range of motion and moves all extremeties. No tenderness or edema.       Neurological: She is alert and oriented to person, place, and time.    Skin: Skin is warm and dry. No rash noted. She is not diaphoretic. No erythema. Nails show no clubbing.    Psychiatric: She has a normal mood and affect. Her behavior is normal. Judgment and thought content normal.       Cervix: deferred     Significant Labs:  Lab Results   Component Value Date    GROUPTRH O POS 07/29/2020    HEPBSAG Negative 07/29/2020       Admit labs pending

## 2020-08-04 NOTE — ASSESSMENT & PLAN NOTE
- type I DM   - most recent a1c 13   - home regimen lantus 20 nightly with 10 units with meals daily   - levemir 20 ordered as well as aspart 10 units daily   - will closely monitor BG in house and titrate insulin as needed   - EKG ordered   - please see MFM consult note for additional counseling documentation   - random glucose at admit 245

## 2020-08-04 NOTE — ASSESSMENT & PLAN NOTE
- most recent h/h 7.1/22   - MCV 91   - suspect anemia of chronic disease   - kidney disease also likely contributing   - iron studies ordered although MCV is normal

## 2020-08-04 NOTE — PROGRESS NOTES
9w5d cw 1TwUS          Initial BMI: Could not be calculated    Diagnosis: DMI, cHTN, CKD III, gastroparesis, restrictive lung disease              PPHRisk: low  Meds: Lantus 20u qhs, Humalog 10/10/10, HCTZ, celexa, metoprolol  Labs: Cr 2.2  P/C 7.88  [ ] Glycemic control  [ ]  Renal consult   [ ] Pulm consult  [ ] 24 urine, echo, ekg, Fe pannel, TSH, CBC, CMP

## 2020-08-04 NOTE — LETTER
August 4, 2020      Marion Jeffrey MD  [address] [facility] [address]           [facility] [address] [address]  [address]  [facility] [address]  Phone: [phone]          Patient: Isabela Read   MR Number: [id number]   YOB: 1995   Date of Visit: 8/4/2020       Dear Dr. Marion Jeffrey:    Thank you for referring Isabela Read to me for evaluation. Attached you will find relevant portions of my assessment and plan of care.    If you have questions, please do not hesitate to call me. I look forward to following Isabela Read along with you.    Sincerely,    Francie Alberto MD    Enclosure  CC:  No Recipients    If you would like to receive this communication electronically, please contact [email or web address] or [phone] to request more information on [facility] access.    For providers and/or their staff who would like to refer a patient to [facility], please contact us through our one-stop-shop provider referral line, [facility] , at [phone].    If you feel you have received this communication in error or would no longer like to receive these types of communications, please e-mail [email or web address]

## 2020-08-04 NOTE — PROGRESS NOTES
Patient appears to have had new patient cardiology visit today-recommend primary ob confirm she reschedules appointment.

## 2020-08-04 NOTE — PROGRESS NOTES
Maternal Fetal Medicine consult    SUBJECTIVE:     Isabela Read is a 25 y.o.  female with IUP at 9w5d by U/S today who is seen in consultation by MFM for consultation for:  Type 1 DM  CKD stage III with anemia and proteinuria  CHTN  Restrictive lung disease    OB HX:  G1 Current    Patient was diagnosed with T1DM at the age of 8. HbA1c 13.0. She is taking insulin as prescribed. She has not seen DM ed or dietician recently. She eats many carbs - juice, white bread, pasta, etc. Fasting BGs run 200-300, postprandial 300+, bedtime anywhere from 200-500. She has a Jil CGM, does not do fingerstick checks. Doesn't carb count or slide insulin. Only corrects with 2u if BG >150.   She was diagnosed with HTN 10 years ago. Was taking Losartan (stopped 1 week ago), now taking HCTZ and metoprolol. Doesn't have a cardiologist. 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   Was previously treated as an asthmatic with symbicort and albuterol when she was younger. Has seen Rolly with pulmonology, who performed PFTs and CT chest. She carries the diagnosis of restrictive lung disease, on no medications. Reports mild SOB at baseline but no acute changes and is able to tolerate normal daily activities. Unable to view PFT results in Epic.  The patient also has CKD stage III with creatinine 2.2 and baseline P:C 7.88. She has never seen a nephrologist. Her H/H is .  She denies any personal/family history of or symptoms of SLE. Did not report family history of kidney disease.    Past Medical History:   Diagnosis Date    Anemia     Chronic kidney disease     Diabetes mellitus     Hypertension     Restrictive lung disease      History reviewed. No pertinent surgical history.  Family history: negative for birth defects, recurrent miscarriages, chromosomal abnormalities.   Social History     Tobacco Use    Smoking status: Never Smoker     Smokeless tobacco: Never Used   Substance Use Topics    Alcohol use: No    Drug use: No     Review of patient's allergies indicates:  No Known Allergies    Medications:  Lantus 20u QHS  Humalog 10/10/10  HCTZ  PNV  Celexa  Metoprolol    Aneuploidy screening:   N/A    Records reviewed    Care team members:  Rachele - Primary OB  Dipp - Endocrinology  Rolly/Belén SANDERS - Pulmonology     OBJECTIVE:     Blood Pressure: 146/104  Weight: 70kg    Ultrasound performed. See viewpoint for full ultrasound report.  Parikh IUP confirmed, FHTs 173bpm    Significant labs/imaging:  Cr 2.2  H/H   P:C 7.88  HbA1c 13.0    ASSESSMENT/PLAN:     25 y.o.  female with IUP at 9w5d by U/S today     Patient is medically complex and was counseled about each of her pregnancy co-morbidities and the risks associated with pregnancy. We discussed the effects pregnancy may have on each of her co-morbidities and the effects these co-morbidities could have on her pregnancy. We also discussed the option of interrupting the pregnancy, which the patient and her partner are not interested in. The patient understands the risks of continuing the pregnancy.   Will admit the patient to the hospital for blood sugar patterning and insulin regimen changes. Will also consult nephrology and pulmonology for recommendations of management of co-morbidities in pregnancy. May have patient undergo Echo/EKG while admitted for full cardiac assessment prior to changing HTN medications.  Patient will need 24 hour urine and anemia evaluation while inpatient.  Primary OB notified of admission. MFM will see the patient back in 1 week after hospital discharge for BG log review.   See below for disease-specific pregnancy recommendations      Pregestational diabetes in pregnancy counseling and recommendations  Today the patient was counseled on the risks of diabetes in pregnancy.  I reviewed the physiologic effects of pregnancy on diabetes and I stressed with the  patient the need for meticulous glucose control.  I discussed with the patient the increased risks of complications including but not limited to fetal structural anomalies, macrosomia, prematurity, shoulder dystocia,  hyperbilirubinemia, and sudden stillbirth in those patients with poor glucose control.   I also discussed with the patient the need for increased fetal surveillance with monthly fetal growth assessments and third trimester fetal testing.  I also reviewed the possibility of need for early delivery in those with poor glucose control or evidence of fetal growth abnormalities.  Recommendations from our MFM group at [facility]:  -Dietary consult (ideally inpatient)  -Diet and/or medication should be used to keep fasting glucose levels <95 mg/dL and 2 hour postprandial levels <120 mg/dL.  Insulin is our recommended first line therapy for those who are unable to control glucose with diet alone.  -In those patients with diabetes existing prior to pregnancy we recommend a fetal echocardiogram around 22-24 weeks gestation to rule out congenital heart disease. Targeted anatomy is recommended. Given increased A1c recommend late first trimester or early second trimester early anatomy assessment.  -The patient should have an eye exam. I would also recommend a podiatric exam. (to be arranged by primary ob)  -Given history an ekg and maternal echocardiogram are recommended. (primary ob to confirm this is done)  -A baby aspirin daily 81mg PO is recommended for preeclampsia prevention after 12 to 14 weeks gestation.  -Supplemental folic acid 4mg PO daily is typically recommended.  -Recommend rechecking hemoglobin A1c every 4-6 weeks. (primary ob to ensure done)  -If type 1 diabetes, recommend checking TSH for a baseline. (primary ob to ensure done)  -Periodic fetal growth assessments should be performed every 4 weeks after the anatomy scan to assess for fetal growth abnormalities.    -Twice weekly  fetal  surveillance is recommended beginning at 32 weeks in most patients but timing may vary for this patient depending on clinical course (NST to alternate with BPP)  -Secondary to the high incidence of preeclampsia in patients with pregestational diabetes we recommend a baseline assessment of preeclampsia labs and assessment of urine protein. Given patient's baseline urine p/c ratio, a 24 hour urine for protein and creatinine clearance should be performed. Patient will also need a monthly assessment of CMP/CBC and urine culture with her primary ob.  -Delivery timing will be dependent on clinical course patient patient is aware that she will deliver early based on comorbidities.  -Recommend offering csection for delivery is EFW is 4500g or more near the time of delivery  -With her markedly elevated hgba1c, she is at high risk for fetal anomaly.  -Insulin regimen to be adjusted by inpatient team    Chronic Hypertension in Pregnancy  She was counseled on maternal/fetal risks associated with CHTN during pregnancy. These include but are not limited to worsening hypertension, superimposed preeclampsia (which may necessitate  delivery), placental abruption,  delivery and low but increased likelihood of stroke, renal failure, and cardiac failure. Fetal risks include miscarriage, stillbirth, growth restriction, prematurity and  death. Risks are increased with evidence of renal dysfunction (serum creatinine >1.1 mg/dL or baseline proteinuria).  -See above regarding labs,  fetal surveillance, growth and baby asa and recommended studies.  -- Close monitoring of patient's blood pressures: Patient's bp should ideally be kept less than 140 over 90 and may need to be kept lower depending on renal/cardiac recommendations.  -Medications to be altered depending on bp and cardiac workup.    CKD stage III  We discussed the risk of developing pre-eclampsia, IUGR,  delivery, miscarriage, stillbirth,  worsening CKD, the possibility of requiring HD during and/or after pregnancy,ESRD which can result in need for transplant and the risk of maternal death.   Recommendations for management are covered above in the DM and HTN recommendations.  Defer to nephrology for any other management of CKD.  Recommend 24 hour urine for protein and creatinine clearance and consideration of renal ultrasound.    Restrictive lung disease  Discussed the increased risk of worsening pulmonary status. Also discussed her increased risk of lluvia COVID and influenza during pregnancy with an underlying pulmonary disease. 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   Defer to pulmonology for any other management of pulmonary disease (recommend primary ob refer to pulmonary if cannot see inpatient).    Anemia  Iron studies and anemia evaluation as inpatient. Consider hematology referral if needed. Hgb electrophoresis did not reveal abnormality but can be affected by increased hgb.  If patient remains anemic, will need blood products on hold at the time of delivery.  Patient may need transfusion in future if symptomatic or depending on anemia progression.    Discussed with patient gestational sac is slightly lower in uterus than typical. We will reassess on follow up to confirm no concerns as pregnancy evolves. The sac is within the uterus and does not have an ectopic location.    We also briefly discussed options of screening and diagnostic testing for aneuploidy and patient will consider.     Patient had DKA last year. Patient advised of need for admission and agrees to go home and obtain her belongings and will return for admission.    Time spent in consultation today: 60 minutes, >50% of which was face-to-face time with the patient.    Discussed with Jose Cruz Solano and Dr. Perez. notified of admit.  Karely Muse MD  Maternal Fetal  Medicine fellow  PGY-6

## 2020-08-04 NOTE — HOSPITAL COURSE
08/04/2020: admit for glycemic control and blood pressure monitoring   08/05/2020: pt continued to remain asymptomatic. Continue titration insulin for appropriate regimen  08/06/2020: Insulin fairly well controlled over past 24 hours on home regimen

## 2020-08-04 NOTE — ASSESSMENT & PLAN NOTE
- patient takes ventolin at home   - continued inpatient   - diagnosis of restrictive lung disease   - has previously seen Dr. Lofton with pulmonology and has had PFTs performed   - pulmonology consult placed

## 2020-08-04 NOTE — HPI
Isabela Read is a 25 y.o. F at 9w5d presents as direct admit after being seen in [facility] clinic earlier today. Following consultation it was recommended that patient be admitted for glycemic control as well as further evaluation of her multiple medical comobordities.     Patient was diagnosed with T1DM at the age of 8. HbA1c 13.0. She is taking insulin as prescribed. She has not seen DM ed or dietician recently. She eats many carbs - juice, white bread, pasta, etc. Fasting BGs run 200-300, postprandial 300+, bedtime anywhere from 200-500. She has a Jil CGM, does not do fingerstick checks. Doesn't carb count or slide insulin. Only corrects with 2u if BG >150.   She was diagnosed with HTN 10 years ago. Was taking Losartan (stopped 1 week ago), now taking HCTZ and metoprolol. Doesn't have a cardiologist. 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   Was previously treated as an asthmatic with symbicort and albuterol when she was younger. Has seen Rolly with pulmonology, who performed PFTs and CT chest. She carries the diagnosis of restrictive lung disease, on no medications. Reports mild SOB at baseline but no acute changes and is able to tolerate normal daily activities. Unable to view PFT results in Epic.  The patient also has CKD stage III with creatinine 2.2 and baseline P:C 7.88. She has never seen a nephrologist. Her H/H is 7/21.  She denies any personal/family history of or symptoms of SLE. Did not report family history of kidney disease.    On initial presentation her BG is 245. Her blood pressure is 200s/100s. She is currently feeling well and is entirely asymptomatic.

## 2020-08-04 NOTE — H&P
[facility]  Obstetrics  History & Physical    Patient Name: Isabela Read  MRN: [id number]  Admission Date: 2020  Primary Care Provider: Cb Mcghee MD    Subjective:     Principal Problem:Poorly controlled diabetes mellitus    History of Present Illness:  Isabela Read is a 25 y.o. F at 9w5d presents as direct admit after being seen in M clinic earlier today. Following consultation it was recommended that patient be admitted for glycemic control as well as further evaluation of her multiple medical comobordities.     Patient was diagnosed with T1DM at the age of 8. HbA1c 13.0. She is taking insulin as prescribed. She has not seen DM ed or dietician recently. She eats many carbs - juice, white bread, pasta, etc. Fasting BGs run 200-300, postprandial 300+, bedtime anywhere from 200-500. She has a Jil CGM, does not do fingerstick checks. Doesn't carb count or slide insulin. Only corrects with 2u if BG >150.   She was diagnosed with HTN 10 years ago. Was taking Losartan (stopped 1 week ago), now taking HCTZ and metoprolol. Doesn't have a cardiologist. 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   Was previously treated as an asthmatic with symbicort and albuterol when she was younger. Has seen Rolly with pulmonology, who performed PFTs and CT chest. She carries the diagnosis of restrictive lung disease, on no medications. Reports mild SOB at baseline but no acute changes and is able to tolerate normal daily activities. Unable to view PFT results in Epic.  The patient also has CKD stage III with creatinine 2.2 and baseline P:C 7.88. She has never seen a nephrologist. Her H/H is .  She denies any personal/family history of or symptoms of SLE. Did not report family history of kidney disease.    On initial presentation her BG is 245. Her blood pressure is 200s/100s. She is currently  feeling well and is entirely asymptomatic.     Obstetric HPI:  Currently feeling well with no complaints. Denies any bleeding or abdominal pain.     OB History    Para Term  AB Living   1 0 0 0 0 0   SAB TAB Ectopic Multiple Live Births   0 0 0 0 0      # Outcome Date GA Lbr Chalo/2nd Weight Sex Delivery Anes PTL Lv   1 Current              Past Medical History:   Diagnosis Date    Anemia     Chronic kidney disease     Diabetes mellitus     Hypertension     Restrictive lung disease      No past surgical history on file.    PTA Medications   Medication Sig    albuterol (VENTOLIN HFA) 90 mcg/actuation inhaler Inhale into the lungs.    blood sugar diagnostic Strp To check BG 4 - 6 times daily, to use with insurance preferred meter    blood-glucose meter kit To check BG 4 times daily, to use with insurance preferred meter    citalopram (CELEXA) 20 MG tablet Take 1 tablet (20 mg total) by mouth once daily.    ferrous gluconate (FERGON) 324 MG tablet Take 324 mg by mouth daily with breakfast.    flash glucose scanning reader (FREESTYLE MARCY 14 DAY READER) Misc 1 each by Misc.(Non-Drug; Combo Route) route once daily.    flash glucose sensor (FREESTYLE MARCY 14 DAY SENSOR) Kit Route 1 every fourteen (fourteen ) days.    fluocinolone and shower cap (DERMA-SMOOTHE/FS SCALP OIL) 0.01 % Oil Apply oil to damp scalp nightly and cover with shower cap. (Patient not taking: Reported on 2020)    hydroCHLOROthiazide (HYDRODIURIL) 25 MG tablet     insulin (LANTUS SOLOSTAR U-100 INSULIN) glargine 100 units/mL (3mL) SubQ pen Inject 20 Units into the skin every evening    insulin lispro (HUMALOG KWIKPEN INSULIN) 100 unit/mL pen Inject 10 units into skin the skin 3 three times daily with meals    ketoconazole (NIZORAL) 2 % shampoo Wash hair with medicated shampoo at least 2x/week - let sit on scalp at least 5 minutes prior to rinsing (Patient not taking: Reported on 2020)    lancets Misc To check BG 4  "- 6 times daily, to use with insurance preferred meter    losartan (COZAAR) 100 MG tablet     losartan-hydrochlorothiazide 100-25 mg (HYZAAR) 100-25 mg per tablet Take 1 tablet by mouth once daily. (Patient not taking: Reported on 7/29/2020)    metoprolol succinate (TOPROL-XL) 50 MG 24 hr tablet Take 1 tablet (50 mg total) by mouth once daily.    norethindrone (ORTHO MICRONOR) 0.35 mg tablet Take 1 tablet (0.35 mg total) by mouth once daily. (Patient not taking: Reported on 7/29/2020)    pen needle, diabetic 32 gauge x 5/32" Ndle For three times daily injection (Patient not taking: Reported on 8/4/2020)       Review of patient's allergies indicates:  No Known Allergies     Family History     Problem Relation (Age of Onset)    Heart disease Father        Tobacco Use    Smoking status: Never Smoker    Smokeless tobacco: Never Used   Substance and Sexual Activity    Alcohol use: No    Drug use: No    Sexual activity: Yes     Partners: Male     Comment: monogamous     Review of Systems   Constitutional: Negative for activity change, appetite change, chills and fever.   Eyes: Negative for visual disturbance.   Respiratory: Negative for cough and shortness of breath.    Cardiovascular: Negative for chest pain.   Gastrointestinal: Negative for abdominal pain, constipation, nausea and vomiting.   Genitourinary: Negative for dysuria, frequency, hematuria, pelvic pain, urgency, vaginal bleeding, vaginal discharge and vaginal odor.   Musculoskeletal: Negative for myalgias.   Integumentary:  Negative for rash.   Neurological: Negative for headaches.   Psychiatric/Behavioral: Negative for depression and sleep disturbance. The patient is not nervous/anxious.       Objective:     Vital Signs (Most Recent):    Vital Signs (24h Range):  BP: (146)/(104) 146/104        There is no height or weight on file to calculate BMI.    Physical Exam:   Constitutional: She is oriented to person, place, and time. She appears " well-developed and well-nourished. No distress.    HENT:   Head: Normocephalic and atraumatic.   Nose: Nose normal.    Eyes: Conjunctivae are normal. Right eye exhibits no discharge. Left eye exhibits no discharge. No scleral icterus.    Neck: Normal range of motion. Neck supple.    Cardiovascular: Normal rate, regular rhythm, normal heart sounds and intact distal pulses.  Exam reveals no clubbing and no edema.     Pulmonary/Chest: Effort normal and breath sounds normal. No respiratory distress. She has no wheezes.        Abdominal: Soft. She exhibits no mass. There is no abdominal tenderness. There is no rebound.             Musculoskeletal: Normal range of motion and moves all extremeties. No tenderness or edema.       Neurological: She is alert and oriented to person, place, and time.    Skin: Skin is warm and dry. No rash noted. She is not diaphoretic. No erythema. Nails show no clubbing.    Psychiatric: She has a normal mood and affect. Her behavior is normal. Judgment and thought content normal.       Cervix: deferred     Significant Labs:  Lab Results   Component Value Date    GROUPTRH O POS 2020    HEPBSAG Negative 2020       Admit labs pending     Assessment/Plan:     25 y.o. female  at 9w5d for:    * Poorly controlled diabetes mellitus  - type I DM   - most recent a1c 13   - home regimen lantus 20 nightly with 10 units with meals daily   - levemir 20 ordered as well as aspart 10 units daily   - will closely monitor BG in house and titrate insulin as needed   - EKG ordered   - please see M consult note for additional counseling documentation   - random glucose at admit 245    Restrictive lung disease  - patient takes ventolin at home   - continued inpatient   - diagnosis of restrictive lung disease   - has previously seen Dr. Lofton with pulmonology and has had PFTs performed   - pulmonology consult placed     Anemia in pregnancy  - most recent h/h 7.   - MCV 91   - suspect anemia of  chronic disease   - kidney disease also likely contributing   - iron studies ordered although MCV is normal     Chronic kidney disease (CKD), stage III (moderate)  - stage III CKD 2/2 diabetes   - baseline Cr 1.8; now 2.2   - nephrology consulted     HTN (hypertension)  - patient with chronic hypertension   - previously on losartan-hctz combination pill however was told recently to stop   - she has taken no medications today   - BP on admit 200s/100s sustained   - IV antihypertensive administered   - oral metoprolol, hctz and procardia 30 initiated for BP control   - will likely need continue titration   - nephrology consulted; appreciate recommendations         lEisabet Anglin MD  Obstetrics  [facility]

## 2020-08-05 ENCOUNTER — TELEPHONE (OUTPATIENT)
Dept: CARDIOLOGY | Facility: CLINIC | Age: 25
End: 2020-08-05

## 2020-08-05 ENCOUNTER — TELEPHONE (OUTPATIENT)
Dept: OBSTETRICS AND GYNECOLOGY | Facility: CLINIC | Age: 25
End: 2020-08-05

## 2020-08-05 LAB
25(OH)D3+25(OH)D2 SERPL-MCNC: 10 NG/ML (ref 30–96)
ALBUMIN SERPL BCP-MCNC: 1.8 G/DL (ref 3.5–5.2)
ALBUMIN SERPL BCP-MCNC: 1.8 G/DL (ref 3.5–5.2)
ALP SERPL-CCNC: 74 U/L (ref 55–135)
ALT SERPL W/O P-5'-P-CCNC: 20 U/L (ref 10–44)
ANION GAP SERPL CALC-SCNC: 10 MMOL/L (ref 8–16)
ANION GAP SERPL CALC-SCNC: 6 MMOL/L (ref 8–16)
ANION GAP SERPL CALC-SCNC: 6 MMOL/L (ref 8–16)
ANION GAP SERPL CALC-SCNC: 9 MMOL/L (ref 8–16)
ASCENDING AORTA: 1.97 CM
AST SERPL-CCNC: 11 U/L (ref 10–40)
AV INDEX (PROSTH): 0.81
AV MEAN GRADIENT: 6 MMHG
AV PEAK GRADIENT: 10 MMHG
AV VALVE AREA: 1.87 CM2
AV VELOCITY RATIO: 0.92
BILIRUB SERPL-MCNC: 0.1 MG/DL (ref 0.1–1)
BSA FOR ECHO PROCEDURE: 1.78 M2
BUN SERPL-MCNC: 32 MG/DL (ref 6–20)
BUN SERPL-MCNC: 33 MG/DL (ref 6–20)
BUN SERPL-MCNC: 33 MG/DL (ref 6–20)
BUN SERPL-MCNC: 35 MG/DL (ref 6–20)
CALCIUM SERPL-MCNC: 7.9 MG/DL (ref 8.7–10.5)
CALCIUM SERPL-MCNC: 7.9 MG/DL (ref 8.7–10.5)
CALCIUM SERPL-MCNC: 8.4 MG/DL (ref 8.7–10.5)
CALCIUM SERPL-MCNC: 8.5 MG/DL (ref 8.7–10.5)
CHLORIDE SERPL-SCNC: 105 MMOL/L (ref 95–110)
CHLORIDE SERPL-SCNC: 106 MMOL/L (ref 95–110)
CHLORIDE SERPL-SCNC: 106 MMOL/L (ref 95–110)
CHLORIDE SERPL-SCNC: 108 MMOL/L (ref 95–110)
CO2 SERPL-SCNC: 19 MMOL/L (ref 23–29)
CO2 SERPL-SCNC: 20 MMOL/L (ref 23–29)
CO2 SERPL-SCNC: 21 MMOL/L (ref 23–29)
CO2 SERPL-SCNC: 21 MMOL/L (ref 23–29)
CREAT CL/1.73 SQ M 12H UR+SERPL-ARVRAT: 21 ML/MIN (ref 70–110)
CREAT SERPL-MCNC: 2 MG/DL (ref 0.5–1.4)
CREAT SERPL-MCNC: 2 MG/DL (ref 0.5–1.4)
CREAT SERPL-MCNC: 2.1 MG/DL (ref 0.5–1.4)
CREAT UR-MCNC: 54.6 MG/DL (ref 15–325)
CREATININE, URINE (MG/SPEC): 600.6 MG/SPEC
CV ECHO LV RWT: 0.67 CM
DOP CALC AO PEAK VEL: 1.58 M/S
DOP CALC AO VTI: 30.8 CM
DOP CALC LVOT AREA: 2.3 CM2
DOP CALC LVOT DIAMETER: 1.71 CM
DOP CALC LVOT PEAK VEL: 1.45 M/S
DOP CALC LVOT STROKE VOLUME: 57.48 CM3
DOP CALCLVOT PEAK VEL VTI: 25.04 CM
E WAVE DECELERATION TIME: 140.06 MSEC
E/A RATIO: 0.99
E/E' RATIO: 9.3 M/S
ECHO LV POSTERIOR WALL: 1.26 CM (ref 0.6–1.1)
EST. GFR  (AFRICAN AMERICAN): 37 ML/MIN/1.73 M^2
EST. GFR  (AFRICAN AMERICAN): 39 ML/MIN/1.73 M^2
EST. GFR  (NON AFRICAN AMERICAN): 32 ML/MIN/1.73 M^2
EST. GFR  (NON AFRICAN AMERICAN): 34 ML/MIN/1.73 M^2
ESTIMATED AVG GLUCOSE: 355 MG/DL (ref 68–131)
FERRITIN SERPL-MCNC: 23 NG/ML (ref 20–300)
FRACTIONAL SHORTENING: 49 % (ref 28–44)
GLUCOSE SERPL-MCNC: 146 MG/DL (ref 70–110)
GLUCOSE SERPL-MCNC: 185 MG/DL (ref 70–110)
GLUCOSE SERPL-MCNC: 68 MG/DL (ref 70–110)
HBA1C MFR BLD HPLC: 14 % (ref 4–5.6)
INTERVENTRICULAR SEPTUM: 1.21 CM (ref 0.6–1.1)
IRON SERPL-MCNC: 66 UG/DL (ref 30–160)
IVRT: 59.98 MSEC
LA MAJOR: 4.63 CM
LA MINOR: 4.98 CM
LA WIDTH: 4.65 CM
LEFT ATRIUM SIZE: 3.8 CM
LEFT ATRIUM VOLUME INDEX MOD: 28.6 ML/M2
LEFT ATRIUM VOLUME INDEX: 41.2 ML/M2
LEFT ATRIUM VOLUME MOD: 50 CM3
LEFT ATRIUM VOLUME: 72.07 CM3
LEFT INTERNAL DIMENSION IN SYSTOLE: 1.94 CM (ref 2.1–4)
LEFT VENTRICLE DIASTOLIC VOLUME INDEX: 34.65 ML/M2
LEFT VENTRICLE DIASTOLIC VOLUME: 60.65 ML
LEFT VENTRICLE MASS INDEX: 90 G/M2
LEFT VENTRICLE SYSTOLIC VOLUME INDEX: 6.8 ML/M2
LEFT VENTRICLE SYSTOLIC VOLUME: 11.85 ML
LEFT VENTRICULAR INTERNAL DIMENSION IN DIASTOLE: 3.77 CM (ref 3.5–6)
LEFT VENTRICULAR MASS: 158.17 G
LV LATERAL E/E' RATIO: 8.45 M/S
LV SEPTAL E/E' RATIO: 10.33 M/S
MV PEAK A VEL: 0.94 M/S
MV PEAK E VEL: 0.93 M/S
MV STENOSIS PRESSURE HALF TIME: 40.62 MS
MV VALVE AREA P 1/2 METHOD: 5.42 CM2
PHOSPHATE SERPL-MCNC: 4.8 MG/DL (ref 2.7–4.5)
PISA TR MAX VEL: 1.74 M/S
POCT GLUCOSE: 114 MG/DL (ref 70–110)
POCT GLUCOSE: 130 MG/DL (ref 70–110)
POCT GLUCOSE: 172 MG/DL (ref 70–110)
POCT GLUCOSE: 177 MG/DL (ref 70–110)
POCT GLUCOSE: 208 MG/DL (ref 70–110)
POCT GLUCOSE: 245 MG/DL (ref 70–110)
POCT GLUCOSE: 330 MG/DL (ref 70–110)
POCT GLUCOSE: 350 MG/DL (ref 70–110)
POCT GLUCOSE: 391 MG/DL (ref 70–110)
POCT GLUCOSE: 82 MG/DL (ref 70–110)
POCT GLUCOSE: 94 MG/DL (ref 70–110)
POTASSIUM SERPL-SCNC: 3.6 MMOL/L (ref 3.5–5.1)
POTASSIUM SERPL-SCNC: 3.8 MMOL/L (ref 3.5–5.1)
POTASSIUM SERPL-SCNC: 3.9 MMOL/L (ref 3.5–5.1)
POTASSIUM SERPL-SCNC: 3.9 MMOL/L (ref 3.5–5.1)
POTASSIUM SERPL-SCNC: 4.2 MMOL/L (ref 3.5–5.1)
PROT 24H UR-MRATE: 5533 MG/SPEC (ref 0–100)
PROT SERPL-MCNC: 5.1 G/DL (ref 6–8.4)
PROT UR-MCNC: 503 MG/DL (ref 0–15)
PTH-INTACT SERPL-MCNC: 181.1 PG/ML (ref 9–77)
PULM VEIN S/D RATIO: 1.88
PV PEAK D VEL: 0.41 M/S
PV PEAK S VEL: 0.77 M/S
PV PEAK VELOCITY: 1.27 CM/S
RA MAJOR: 4.14 CM
RA PRESSURE: 3 MMHG
RIGHT VENTRICULAR END-DIASTOLIC DIMENSION: 3.2 CM
SATURATED IRON: 18 % (ref 20–50)
SINUS: 2.4 CM
SODIUM SERPL-SCNC: 133 MMOL/L (ref 136–145)
SODIUM SERPL-SCNC: 133 MMOL/L (ref 136–145)
SODIUM SERPL-SCNC: 134 MMOL/L (ref 136–145)
SODIUM SERPL-SCNC: 137 MMOL/L (ref 136–145)
STJ: 1.96 CM
TDI LATERAL: 0.11 M/S
TDI SEPTAL: 0.09 M/S
TDI: 0.1 M/S
TOTAL IRON BINDING CAPACITY: 376 UG/DL (ref 250–450)
TR MAX PG: 12 MMHG
TRANSFERRIN SERPL-MCNC: 254 MG/DL (ref 200–375)
TRICUSPID ANNULAR PLANE SYSTOLIC EXCURSION: 1.46 CM
TV REST PULMONARY ARTERY PRESSURE: 15 MMHG
URINE COLLECTION DURATION: 24 HR
URINE COLLECTION DURATION: 24 HR
URINE VOLUME: 1100 ML
URINE VOLUME: 1100 ML

## 2020-08-05 PROCEDURE — 99233 PR SUBSEQUENT HOSPITAL CARE,LEVL III: ICD-10-PCS | Mod: ,,, | Performed by: INTERNAL MEDICINE

## 2020-08-05 PROCEDURE — 11000001 HC ACUTE MED/SURG PRIVATE ROOM

## 2020-08-05 PROCEDURE — 86255 FLUORESCENT ANTIBODY SCREEN: CPT

## 2020-08-05 PROCEDURE — 99232 PR SUBSEQUENT HOSPITAL CARE,LEVL II: ICD-10-PCS | Mod: ,,, | Performed by: OBSTETRICS & GYNECOLOGY

## 2020-08-05 PROCEDURE — 86256 FLUORESCENT ANTIBODY TITER: CPT

## 2020-08-05 PROCEDURE — 82306 VITAMIN D 25 HYDROXY: CPT

## 2020-08-05 PROCEDURE — 86592 SYPHILIS TEST NON-TREP QUAL: CPT

## 2020-08-05 PROCEDURE — 25000003 PHARM REV CODE 250: Performed by: STUDENT IN AN ORGANIZED HEALTH CARE EDUCATION/TRAINING PROGRAM

## 2020-08-05 PROCEDURE — 86255 FLUORESCENT ANTIBODY SCREEN: CPT | Mod: 91

## 2020-08-05 PROCEDURE — 86235 NUCLEAR ANTIGEN ANTIBODY: CPT | Mod: 59

## 2020-08-05 PROCEDURE — 82947 ASSAY GLUCOSE BLOOD QUANT: CPT

## 2020-08-05 PROCEDURE — 82575 CREATININE CLEARANCE TEST: CPT

## 2020-08-05 PROCEDURE — 84156 ASSAY OF PROTEIN URINE: CPT

## 2020-08-05 PROCEDURE — 86160 COMPLEMENT ANTIGEN: CPT | Mod: 59

## 2020-08-05 PROCEDURE — 84132 ASSAY OF SERUM POTASSIUM: CPT

## 2020-08-05 PROCEDURE — 83520 IMMUNOASSAY QUANT NOS NONAB: CPT

## 2020-08-05 PROCEDURE — 83036 HEMOGLOBIN GLYCOSYLATED A1C: CPT

## 2020-08-05 PROCEDURE — 86039 ANTINUCLEAR ANTIBODIES (ANA): CPT

## 2020-08-05 PROCEDURE — 80069 RENAL FUNCTION PANEL: CPT

## 2020-08-05 PROCEDURE — 80053 COMPREHEN METABOLIC PANEL: CPT

## 2020-08-05 PROCEDURE — 63600175 PHARM REV CODE 636 W HCPCS: Performed by: STUDENT IN AN ORGANIZED HEALTH CARE EDUCATION/TRAINING PROGRAM

## 2020-08-05 PROCEDURE — 86060 ANTISTREPTOLYSIN O TITER: CPT

## 2020-08-05 PROCEDURE — 86160 COMPLEMENT ANTIGEN: CPT

## 2020-08-05 PROCEDURE — 83970 ASSAY OF PARATHORMONE: CPT

## 2020-08-05 PROCEDURE — 99900035 HC TECH TIME PER 15 MIN (STAT)

## 2020-08-05 PROCEDURE — 80048 BASIC METABOLIC PNL TOTAL CA: CPT | Mod: 91

## 2020-08-05 PROCEDURE — 99232 SBSQ HOSP IP/OBS MODERATE 35: CPT | Mod: ,,, | Performed by: OBSTETRICS & GYNECOLOGY

## 2020-08-05 PROCEDURE — 94761 N-INVAS EAR/PLS OXIMETRY MLT: CPT

## 2020-08-05 PROCEDURE — 86038 ANTINUCLEAR ANTIBODIES: CPT

## 2020-08-05 PROCEDURE — 36415 COLL VENOUS BLD VENIPUNCTURE: CPT

## 2020-08-05 PROCEDURE — 99233 SBSQ HOSP IP/OBS HIGH 50: CPT | Mod: ,,, | Performed by: INTERNAL MEDICINE

## 2020-08-05 RX ORDER — INSULIN ASPART 100 [IU]/ML
1-10 INJECTION, SOLUTION INTRAVENOUS; SUBCUTANEOUS
Status: DISCONTINUED | OUTPATIENT
Start: 2020-08-05 | End: 2020-08-06 | Stop reason: HOSPADM

## 2020-08-05 RX ORDER — IBUPROFEN 200 MG
16 TABLET ORAL
Status: DISCONTINUED | OUTPATIENT
Start: 2020-08-05 | End: 2020-08-06 | Stop reason: HOSPADM

## 2020-08-05 RX ORDER — GLUCAGON 1 MG
1 KIT INJECTION
Status: DISCONTINUED | OUTPATIENT
Start: 2020-08-05 | End: 2020-08-06 | Stop reason: HOSPADM

## 2020-08-05 RX ORDER — FERROUS SULFATE 325(65) MG
325 TABLET, DELAYED RELEASE (ENTERIC COATED) ORAL DAILY
Status: DISCONTINUED | OUTPATIENT
Start: 2020-08-05 | End: 2020-08-06 | Stop reason: HOSPADM

## 2020-08-05 RX ORDER — INSULIN ASPART 100 [IU]/ML
10 INJECTION, SOLUTION INTRAVENOUS; SUBCUTANEOUS
Status: DISCONTINUED | OUTPATIENT
Start: 2020-08-05 | End: 2020-08-06 | Stop reason: HOSPADM

## 2020-08-05 RX ORDER — INSULIN ASPART 100 [IU]/ML
15 INJECTION, SOLUTION INTRAVENOUS; SUBCUTANEOUS
Status: DISCONTINUED | OUTPATIENT
Start: 2020-08-05 | End: 2020-08-05

## 2020-08-05 RX ORDER — LABETALOL HYDROCHLORIDE 5 MG/ML
INJECTION, SOLUTION INTRAVENOUS
Status: DISPENSED
Start: 2020-08-05 | End: 2020-08-06

## 2020-08-05 RX ORDER — LABETALOL HYDROCHLORIDE 5 MG/ML
20 INJECTION, SOLUTION INTRAVENOUS ONCE
Status: COMPLETED | OUTPATIENT
Start: 2020-08-05 | End: 2020-08-05

## 2020-08-05 RX ORDER — LABETALOL HCL 20 MG/4 ML
10 SYRINGE (ML) INTRAVENOUS ONCE
Status: DISCONTINUED | OUTPATIENT
Start: 2020-08-05 | End: 2020-08-05

## 2020-08-05 RX ORDER — IBUPROFEN 200 MG
24 TABLET ORAL
Status: DISCONTINUED | OUTPATIENT
Start: 2020-08-05 | End: 2020-08-06 | Stop reason: HOSPADM

## 2020-08-05 RX ADMIN — HYDROCHLOROTHIAZIDE 25 MG: 25 TABLET ORAL at 09:08

## 2020-08-05 RX ADMIN — CITALOPRAM HYDROBROMIDE 20 MG: 20 TABLET ORAL at 09:08

## 2020-08-05 RX ADMIN — INSULIN HUMAN 8 UNITS: 100 INJECTION, SOLUTION PARENTERAL at 12:08

## 2020-08-05 RX ADMIN — PRENATAL VIT W/ FE FUMARATE-FA TAB 27-0.8 MG 1 TABLET: 27-0.8 TAB at 09:08

## 2020-08-05 RX ADMIN — INSULIN ASPART 10 UNITS: 100 INJECTION, SOLUTION INTRAVENOUS; SUBCUTANEOUS at 03:08

## 2020-08-05 RX ADMIN — LABETALOL HYDROCHLORIDE 20 MG: 5 INJECTION, SOLUTION INTRAVENOUS at 05:08

## 2020-08-05 RX ADMIN — INSULIN ASPART 10 UNITS: 100 INJECTION, SOLUTION INTRAVENOUS; SUBCUTANEOUS at 07:08

## 2020-08-05 RX ADMIN — INSULIN ASPART 10 UNITS: 100 INJECTION, SOLUTION INTRAVENOUS; SUBCUTANEOUS at 09:08

## 2020-08-05 RX ADMIN — FERROUS SULFATE TAB EC 325 MG (65 MG FE EQUIVALENT) 325 MG: 325 (65 FE) TABLET DELAYED RESPONSE at 09:08

## 2020-08-05 RX ADMIN — INSULIN HUMAN 10 UNITS: 100 INJECTION, SOLUTION PARENTERAL at 12:08

## 2020-08-05 RX ADMIN — NIFEDIPINE 30 MG: 30 TABLET, FILM COATED, EXTENDED RELEASE ORAL at 10:08

## 2020-08-05 RX ADMIN — METOPROLOL SUCCINATE 50 MG: 25 TABLET, EXTENDED RELEASE ORAL at 09:08

## 2020-08-05 NOTE — ASSESSMENT & PLAN NOTE
- type I DM   - today's a1c 14  - Random glucose on admit elevated, requiring multiple additional pushes of insulin (30u overnight)  - home regimen lantus 20 nightly with 10 units with meals daily    - Lantus 20 qhs and 10/10/10  - will closely monitor BG in house and titrate insulin as needed with SSI  - EKG and echo ordered  - please see MFM consult note for additional counseling documentation

## 2020-08-05 NOTE — ASSESSMENT & PLAN NOTE
- most recent h/h 7.1/22   - MCV 91   - suspect anemia of chronic disease   - kidney disease also likely contributing   - iron studies ordered although MCV is normal   - will consider transfusion

## 2020-08-05 NOTE — CARE UPDATE
Critical value glucose on CMP of 604 noted  Potassium 4.3  Anion gap of 10   Patient remains asymptomatic     Discussed with on call MFM staff   Plan to correct BG with IV insulin   10 units now   Recheck BG 30 minutes after dose   Recheck potassium in 3 hours     Elisabet Anglin MD  OBGYN, PGY-3

## 2020-08-05 NOTE — ASSESSMENT & PLAN NOTE
- patient takes ventolin at home   - continued inpatient   - diagnosis of restrictive lung disease   - has previously seen Dr. Lofton with pulmonology and has had PFTs performed   - per pulm consult, no obvious etiology for restrictive lung disease. Also, no treatment currently available  - pt can follow up outpatient, however not urgent as sx are stable and no treatment available

## 2020-08-05 NOTE — PROGRESS NOTES
[facility]  Obstetrics  Antepartum Progress Note    Patient Name: Isabela Read  MRN: [id number]  Admission Date: 2020  Hospital Length of Stay: 1 days  Attending Physician: Francie Alberto MD  Primary Care Provider: Cb Mcghee MD    Subjective:     Principal Problem:Poorly controlled diabetes mellitus    HPI:  Isabela Read is a 25 y.o. F at 9w5d presents as direct admit after being seen in MFM clinic earlier today. Following consultation it was recommended that patient be admitted for glycemic control as well as further evaluation of her multiple medical comobordities.     Patient was diagnosed with T1DM at the age of 8. HbA1c 13.0. She is taking insulin as prescribed. She has not seen DM ed or dietician recently. She eats many carbs - juice, white bread, pasta, etc. Fasting BGs run 200-300, postprandial 300+, bedtime anywhere from 200-500. She has a Jil CGM, does not do fingerstick checks. Doesn't carb count or slide insulin. Only corrects with 2u if BG >150.   She was diagnosed with HTN 10 years ago. Was taking Losartan (stopped 1 week ago), now taking HCTZ and metoprolol. Doesn't have a cardiologist. 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   Was previously treated as an asthmatic with symbicort and albuterol when she was younger. Has seen Rolly with pulmonology, who performed PFTs and CT chest. She carries the diagnosis of restrictive lung disease, on no medications. Reports mild SOB at baseline but no acute changes and is able to tolerate normal daily activities. Unable to view PFT results in Epic.  The patient also has CKD stage III with creatinine 2.2 and baseline P:C 7.88. She has never seen a nephrologist. Her H/H is 7.  She denies any personal/family history of or symptoms of SLE. Did not report family history of kidney disease.    On initial presentation her BG is  245. Her blood pressure is 200s/100s. She is currently feeling well and is entirely asymptomatic.     Hospital Course:  2020: admit for glycemic control and blood pressure monitoring   2020: pt continued to remain asymptomatic. Continue titration insulin for appropriate regimen    Obstetric HPI:  Currently feeling well with no complaints. Denies any bleeding or abdominal pain.     OB History    Para Term  AB Living   1 0 0 0 0 0   SAB TAB Ectopic Multiple Live Births   0 0 0 0 0      # Outcome Date GA Lbr Chalo/2nd Weight Sex Delivery Anes PTL Lv   1 Current              Past Medical History:   Diagnosis Date    Anemia     Chronic kidney disease     Diabetes mellitus     Hypertension     Nephrotic syndrome     Restrictive lung disease      No past surgical history on file.    PTA Medications   Medication Sig    albuterol (VENTOLIN HFA) 90 mcg/actuation inhaler Inhale into the lungs.    blood sugar diagnostic Strp To check BG 4 - 6 times daily, to use with insurance preferred meter    blood-glucose meter kit To check BG 4 times daily, to use with insurance preferred meter    citalopram (CELEXA) 20 MG tablet Take 1 tablet (20 mg total) by mouth once daily.    ferrous gluconate (FERGON) 324 MG tablet Take 324 mg by mouth daily with breakfast.    flash glucose scanning reader (FREESTYLE MARCY 14 DAY READER) Misc 1 each by Misc.(Non-Drug; Combo Route) route once daily.    flash glucose sensor (FREESTYLE MARCY 14 DAY SENSOR) Kit Route 1 every fourteen (fourteen ) days.    fluocinolone and shower cap (DERMA-SMOOTHE/FS SCALP OIL) 0.01 % Oil Apply oil to damp scalp nightly and cover with shower cap. (Patient not taking: Reported on 2020)    hydroCHLOROthiazide (HYDRODIURIL) 25 MG tablet     insulin (LANTUS SOLOSTAR U-100 INSULIN) glargine 100 units/mL (3mL) SubQ pen Inject 20 Units into the skin every evening    insulin lispro (HUMALOG KWIKPEN INSULIN) 100 unit/mL pen Inject 10  "units into skin the skin 3 three times daily with meals    ketoconazole (NIZORAL) 2 % shampoo Wash hair with medicated shampoo at least 2x/week - let sit on scalp at least 5 minutes prior to rinsing (Patient not taking: Reported on 8/4/2020)    lancets Misc To check BG 4 - 6 times daily, to use with insurance preferred meter    losartan (COZAAR) 100 MG tablet     losartan-hydrochlorothiazide 100-25 mg (HYZAAR) 100-25 mg per tablet Take 1 tablet by mouth once daily. (Patient not taking: Reported on 7/29/2020)    metoprolol succinate (TOPROL-XL) 50 MG 24 hr tablet Take 1 tablet (50 mg total) by mouth once daily.    norethindrone (ORTHO MICRONOR) 0.35 mg tablet Take 1 tablet (0.35 mg total) by mouth once daily. (Patient not taking: Reported on 7/29/2020)    pen needle, diabetic 32 gauge x 5/32" Ndle For three times daily injection (Patient not taking: Reported on 8/4/2020)       Review of patient's allergies indicates:  No Known Allergies     Family History     Problem Relation (Age of Onset)    Heart disease Father        Tobacco Use    Smoking status: Never Smoker    Smokeless tobacco: Never Used   Substance and Sexual Activity    Alcohol use: No    Drug use: No    Sexual activity: Yes     Partners: Male     Comment: monogamous     Review of Systems   Constitutional: Negative for activity change, appetite change, chills and fever.   Eyes: Negative for visual disturbance.   Respiratory: Negative for cough and shortness of breath.    Cardiovascular: Negative for chest pain.   Gastrointestinal: Negative for abdominal pain, constipation, nausea and vomiting.   Genitourinary: Negative for dysuria, frequency, hematuria, pelvic pain, urgency, vaginal bleeding, vaginal discharge and vaginal odor.   Musculoskeletal: Negative for myalgias.   Integumentary:  Negative for rash.   Neurological: Negative for headaches.   Psychiatric/Behavioral: Negative for depression and sleep disturbance. The patient is not " nervous/anxious.       Objective:     Vital Signs (Most Recent):  Temp: 98.4 °F (36.9 °C) (20 0800)  Pulse: 94 (20 0759)  Resp: 16 (20 0429)  BP: 127/60 (20 0800)  SpO2: 100 % (20 0429) Vital Signs (24h Range):  Temp:  [98.2 °F (36.8 °C)-98.4 °F (36.9 °C)] 98.4 °F (36.9 °C)  Pulse:  [] 94  Resp:  [16-20] 16  SpO2:  [98 %-100 %] 100 %  BP: (124-236)/() 127/60     Weight: 69.9 kg (154 lb)  Body mass index is 26.43 kg/m².    Physical Exam:   Constitutional: She is oriented to person, place, and time. She appears well-developed and well-nourished. No distress.    HENT:   Head: Normocephalic and atraumatic.   Nose: Nose normal.    Eyes: Conjunctivae are normal. Right eye exhibits no discharge. Left eye exhibits no discharge. No scleral icterus.    Neck: Normal range of motion. Neck supple.    Cardiovascular: Normal rate, regular rhythm, normal heart sounds and intact distal pulses.  Exam reveals no clubbing and no edema.     Pulmonary/Chest: Effort normal and breath sounds normal. No respiratory distress. She has no wheezes.        Abdominal: Soft. She exhibits no mass. There is no abdominal tenderness. There is no rebound.             Musculoskeletal: Normal range of motion and moves all extremeties. No tenderness or edema.       Neurological: She is alert and oriented to person, place, and time.    Skin: Skin is warm and dry. No rash noted. She is not diaphoretic. No erythema. Nails show no clubbing.    Psychiatric: She has a normal mood and affect. Her behavior is normal. Judgment and thought content normal.       Cervix: deferred     Significant Labs:  Lab Results   Component Value Date    GROUPTRH O POS 2020    HEPBSAG Negative 2020         Assessment/Plan:     25 y.o. female  at 9w6d for:    * Poorly controlled diabetes mellitus  - type I DM   - today's a1c 14  - Random glucose on admit elevated, requiring multiple additional pushes of insulin (30u  overnight)  - home regimen lantus 20 nightly with 10 units with meals daily    - Lantus 20 qhs and 10/10/10  - will closely monitor BG in house and titrate insulin as needed with SSI  - EKG and echo ordered  - please see [facility] consult note for additional counseling documentation       Restrictive lung disease  - patient takes ventolin at home   - continued inpatient   - diagnosis of restrictive lung disease   - has previously seen Dr. Lofton with pulmonology and has had PFTs performed   - per pulm consult, no obvious etiology for restrictive lung disease. Also, no treatment currently available  - pt can follow up outpatient, however not urgent as sx are stable and no treatment available    Anemia in pregnancy  - most recent h/h 7.1/22   - MCV 91   - suspect anemia of chronic disease   - kidney disease also likely contributing   - iron studies ordered although MCV is normal   - will consider transfusion    Chronic kidney disease (CKD), stage III (moderate)  - stage III CKD 2/2 diabetes; pt unaware of severity of disease  - baseline Cr 1.8; now 2.2   - nephrology consulted     HTN (hypertension)  - patient with chronic hypertension   - previously on losartan-hctz combination pill however was told recently to stop   - BP on admit 200s/100s sustained   - IV antihypertensive administered with improvement of BP  - oral metoprolol, hctz and procardia 30 initiated for BP control   - will likely need continue titration   - nephrology consulted; advised to consider changing metoprolol to coreg for antiprotenuric effects  - EKG NSR w/ possible LV enlargement  - Echo ordered      ARMIN Cárdenas MD  OBGYN PGY2

## 2020-08-05 NOTE — CONSULTS
"Pulmonary / Critical Care Medicine  Consult Note    Reason for Consult: restrictive lung disease      History of Present Illness:     Ms. Isabela Read is a 26 yo  F  at 9w6d with PMHx poorly controlled DM1 (dx age 8, all recent HbA1c > 12), HTN, CKD, anemia, and restrictive lung disease of unknown etiology who was admitted to [facility] service at [facility] for hypertension and hyperglycemia. Pulmonary consulted due to history of restrictive lung disease.    Patient reports that she has dealt with shortness of breath for several years. States she gets SOB with minimal to no exertion. Does not have difficulty with ADL such as bathing or cleaning her house. But states tasks such as walking flat ground certain distances or up stairs makes her short of breath. States these symptoms have remained stable over the past several years, without any worsening even with her recently becoming pregnant. Denies symptoms of neuromuscular weakness. Denies cough or wheezing. States she previously was on Symbicort and albuterol however these did not help her breathing. Denies chest pain or palpitations. Does report that her legs are "always swollen" and have been like that for years. Reports sleeps flat without difficulty, denies orthopnea or PND. She reports she is a never smoker, denies e-cigarette use or exposure to any other inhalants/chemicals. Denies any personal or family history of blood clots or autoimmune/rheumatologic diseases.      ROS: Comprehensive review of systems obtained and is negative except as noted in HPI.    PMHx:   Past Medical History:   Diagnosis Date    Anemia     Chronic kidney disease     Diabetes mellitus     Hypertension     Restrictive lung disease        Home Meds:   No current facility-administered medications on file prior to encounter.      Current Outpatient Medications on File Prior to Encounter   Medication Sig Dispense Refill    albuterol (VENTOLIN HFA) 90 mcg/actuation inhaler Inhale " into the lungs.      blood sugar diagnostic Strp To check BG 4 - 6 times daily, to use with insurance preferred meter 150 each 11    blood-glucose meter kit To check BG 4 times daily, to use with insurance preferred meter 1 each 1    citalopram (CELEXA) 20 MG tablet Take 1 tablet (20 mg total) by mouth once daily. 90 tablet 0    ferrous gluconate (FERGON) 324 MG tablet Take 324 mg by mouth daily with breakfast.      flash glucose scanning reader (FREESTYLE MARCY 14 DAY READER) Misc 1 each by Misc.(Non-Drug; Combo Route) route once daily. 1 each 0    flash glucose sensor (FREESTYLE MARCY 14 DAY SENSOR) Kit Route 1 every fourteen (fourteen ) days. 2 kit 11    fluocinolone and shower cap (DERMA-SMOOTHE/FS SCALP OIL) 0.01 % Oil Apply oil to damp scalp nightly and cover with shower cap. (Patient not taking: Reported on 8/4/2020) 1 Bottle 3    hydroCHLOROthiazide (HYDRODIURIL) 25 MG tablet       insulin (LANTUS SOLOSTAR U-100 INSULIN) glargine 100 units/mL (3mL) SubQ pen Inject 20 Units into the skin every evening 15 mL 6    insulin lispro (HUMALOG KWIKPEN INSULIN) 100 unit/mL pen Inject 10 units into skin the skin 3 three times daily with meals 15 mL 6    ketoconazole (NIZORAL) 2 % shampoo Wash hair with medicated shampoo at least 2x/week - let sit on scalp at least 5 minutes prior to rinsing (Patient not taking: Reported on 8/4/2020) 120 mL 5    lancets Misc To check BG 4 - 6 times daily, to use with insurance preferred meter 150 each 11    losartan (COZAAR) 100 MG tablet       losartan-hydrochlorothiazide 100-25 mg (HYZAAR) 100-25 mg per tablet Take 1 tablet by mouth once daily. (Patient not taking: Reported on 7/29/2020) 90 tablet 0    metoprolol succinate (TOPROL-XL) 50 MG 24 hr tablet Take 1 tablet (50 mg total) by mouth once daily. 90 tablet 0    norethindrone (ORTHO MICRONOR) 0.35 mg tablet Take 1 tablet (0.35 mg total) by mouth once daily. (Patient not taking: Reported on 7/29/2020) 90 tablet 3  "   pen needle, diabetic 32 gauge x 5/32" Ndle For three times daily injection (Patient not taking: Reported on 8/4/2020) 100 each 11       PSHx:   No past surgical history on file.    Allergies:   Review of patient's allergies indicates:  No Known Allergies      SHX:   - Tobacco:  reports that she has never smoked. She has never used smokeless tobacco.  - EtOH:  reports no history of alcohol use.  - Illicit:   Social History     Substance and Sexual Activity   Drug Use No     - Occupation: Data Unavailable    FHx:   family history includes Heart disease in her father.    Current Meds:   Scheduled:    citalopram  20 mg Oral Daily    ferrous sulfate  325 mg Oral Daily    hydroCHLOROthiazide  25 mg Oral Daily    insulin aspart U-100  10 Units Subcutaneous TIDWM    insulin detemir U-100  20 Units Subcutaneous QHS    metoprolol succinate  50 mg Oral Daily    NIFEdipine  30 mg Oral QHS    prenatal vitamin  1 tablet Oral Daily       Continuous Infusions:       PRN:   acetaminophen, albuterol, dextrose 50%, dextrose 50%, diphenhydrAMINE, diphenhydrAMINE, glucagon (human recombinant), glucose, glucose, insulin aspart U-100, ondansetron, promethazine (PHENERGAN) IVPB, senna-docusate 8.6-50 mg, simethicone    VITAL SIGNS:   Temp:  [98.2 °F (36.8 °C)-98.4 °F (36.9 °C)]   Pulse:  []   Resp:  [16-20]   BP: (124-236)/()   SpO2:  [98 %-100 %]        PHYSICAL EXAM  Gen: well developed, well nourished, no distress   HEENT: lips, mucosa, and tongue normal; teeth and gums normal  conjunctivae/corneas clear. PERRL.   CVS: regular rate and rhythm, no murmur   Chest: clear to auscultation bilaterally, normal respiratory effort and normal percussion bilaterally   Abdomen: soft, non-tender non-distended; bowel sounds normal   Ext: warm, well perfused and trace edema bilateral lower extremities   Skin: no rashes, no petechiae   Neuro: oriented, normal mood, grossly non-focal            LABS:     Recent Labs   Lab " 20  1844  20  0437   WBC 9.38  --   --    RBC 2.59*  --   --    HGB 7.5*  --   --    HCT 22.8*  --   --    *  --   --    MCV 88  --   --    MCH 29.0  --   --    MCHC 32.9  --   --    *   < > 133*  133*   K 4.3   < > 3.9  3.9      < > 106  106   CO2 21*   < > 21*  21*   BUN 31*   < > 33*  33*   CREATININE 2.1*   < > 2.1*  2.1*   ALT 23   < > 20   AST 16   < > 11   ALKPHOS 85   < > 74   BILITOT 0.2   < > 0.1   PROT 6.1   < > 5.1*   ALBUMIN 2.2*   < > 1.8*  1.8*    < > = values in this interval not displayed.           CXR:    No results found in the last 24 hours.       PFTs 2018 (At [facility]):   - FEV1 1.65 (57%)  - FVC 2.43 (64%)  - ratio 76  - TLC 3.3 (60%)   - DLCO 13.6 (52%)   Important to note that her height was incorrectly entered to PFTs demographic (height entered making her 2 inches taller than actually is) which can affect the overall accuracy of her indices.         ASSESSMENT/PLAN:   Ms. Isabela Read is a 24 yo  F  at 9w6d with PMHx poorly controlled DM1 (dx age 8, all recent HbA1c > 12), HTN, CKD, anemia, and restrictive lung disease of unknown etiology who was admitted to MFM service at [facility] for hypertension and hyperglycemia. Pulmonary consulted due to history of restrictive lung disease.    1) Restrictive lung disease: PFTs from 2018 at [facility] consistent with restrictive process (See above for values). unknown etiology, reports chronic SOB for years. No cough or wheeze arguing reactive airway disease. Never smoker. CT chest from 2020 without any evidence of underlying parenchymal lung disease that would explain PFTs. No signs/sx that argue neuromuscular weakness is culprit either. Does have chronic PAYTON and poorly controlled HTN and DM which are risk factors for HFpEF which can affect diffusion (pulm edema). Anemia also likely contributing to reduction in diffusing capacity. However, these do not explain underlying reduced lung volume.  Important to note that her height was incorrectly entered to PFTs demographic (height entered making her 2 inches taller than actually is) which can affect the overall indices, however do not suspect it to drastically alter them.   - no indications for inhalers or other therapy at this time  - can follow up as outpatient pulmonary, consider repeat PFTs at that time   - F/U TTE to screen for pulm HTN as below    2)SOB: likely multifactorial, combination of restriction + likely HFpEF + anemia  - unfortunately, have no target for therapy of restrictive disease thus treatment of other etiologies of SOB is key to treating symptomology.   - agree with TTE to assess heart function, allow assessment of estPAP as well  - treatment of anemia will likely help with SOB as well  - counseled patient the importance of improved glycemic and blood pressure control in long run for her overall health. She is 25 years old and already has CKD which is very unfortunate. She likely already has HFpEF and this will be main contributor to her SOB (reports chronic PAYTON, EKG with signs of LAE, likely signs of underlying HFpEF in this long term uncontrolled HTN and DM patient).     Thank you for this consult, will sign off. Do not hesitate to contact for additional questions or concerns.       Luisa Valle MD  Pulmonary / Critical Care Fellow  Pager: [phone]  08/05/2020  8:34 AM

## 2020-08-05 NOTE — CONSULTS
Consult Note  Nephrology    Consult Requested By: Francie Alberto MD  Reason for Consult: Nephrotic syn/HTN/CKD III-IV    SUBJECTIVE:     History of Present Illness:  Patient is a 25 y.o. female presents with direct admission from OB office for BP/glucose control.  Medical history as outlined below including noncompliance with medication regimen, nephrotic syndrome, uncontrolled type 1 diabetes, and accelerated hypertension.  Patient is 9 weeks pregnant and has been advised of the risk.  Consulted for CKD, nephrotic syndrome, blood pressure regimen.    Case discussed with OB team yesterday.  Patient seen and examined.  Feels okay today.  Glucose and blood pressure improved since yesterday.  Explained findings to patient about nephrotic syndrome.  No need for renal biopsy at this time and will have to get 1 after delivery.  Does not follow nephrologist but highly recommended.  Denies any NSAIDs.    Epic reviewed.    No CP/SOB/N/V/D/F/C.      Past Medical History:   Diagnosis Date    Anemia     Chronic kidney disease     Diabetes mellitus     Hypertension     Nephrotic syndrome     Restrictive lung disease      No past surgical history on file.  Family History   Problem Relation Age of Onset    Heart disease Father      Social History     Tobacco Use    Smoking status: Never Smoker    Smokeless tobacco: Never Used   Substance Use Topics    Alcohol use: No    Drug use: No       Review of patient's allergies indicates:  No Known Allergies     Review of Systems:  Constitutional: No fever or chills  Respiratory: No cough or shortness of breath  Cardiovascular: No chest pain or palpitations  Gastrointestinal: No nausea or vomiting  Neurological: No confusion or weakness    OBJECTIVE:     Vital Signs (Most Recent)  Temp: 98.4 °F (36.9 °C) (08/05/20 0800)  Pulse: 94 (08/05/20 0759)  Resp: 16 (08/05/20 0429)  BP: 127/60 (08/05/20 0800)  SpO2: 100 % (08/05/20 0429)    Vital Signs Range (Last 24H):  Temp:  [98.2 °F  (36.8 °C)-98.4 °F (36.9 °C)]   Pulse:  []   Resp:  [16-20]   BP: (124-236)/()   SpO2:  [98 %-100 %]       Intake/Output Summary (Last 24 hours) at 8/5/2020 1029  Last data filed at 8/4/2020 2245  Gross per 24 hour   Intake --   Output 900 ml   Net -900 ml       Physical Exam:  General appearance: Well developed, well nourished  Eyes:  Conjunctivae/corneas clear. PERRL.  Lungs: Normal respiratory effort,   clear to auscultation bilaterally   Heart: Regular rate and rhythm, S1, S2 normal, no murmur, rub or camille.  Abdomen: Soft, non-tender non-distended; bowel sounds normal; no masses,  no organomegaly  Extremities: No cyanosis or clubbing. 3+ edema.    Skin: Skin color, texture, turgor normal. No rashes or lesions  Neurologic: Normal strength and tone. No focal numbness or weakness       Laboratory:  Recent Labs   Lab 08/04/20  1844   WBC 9.38   RBC 2.59*   HGB 7.5*   HCT 22.8*   *   MCV 88   MCH 29.0   MCHC 32.9     BMP:   Recent Labs   Lab 08/05/20  0437   *  185*   *  133*   K 3.9  3.9     106   CO2 21*  21*   BUN 33*  33*   CREATININE 2.1*  2.1*   CALCIUM 7.9*  7.9*     Lab Results   Component Value Date    CALCIUM 7.9 (L) 08/05/2020    CALCIUM 7.9 (L) 08/05/2020    PHOS 4.8 (H) 08/05/2020     BNP  No results for input(s): BNP, BNPTRIAGEBLO in the last 168 hours.No results found for: URICACID  Lab Results   Component Value Date    IRON 66 08/04/2020    TIBC 376 08/04/2020    FERRITIN 23 08/04/2020     Lab Results   Component Value Date    CALCIUM 7.9 (L) 08/05/2020    CALCIUM 7.9 (L) 08/05/2020    PHOS 4.8 (H) 08/05/2020       Diagnostic Results:  No orders to display       ASSESSMENT/PLAN:     1. GEORGE on CKD IIIB with baseline creat 1.9 and nephrotic syn (N18.4, N04.9):  Creat slowly improving  No immediate need for RRT.  Denies NSAIDS.  US renal in record noted.  Can't use ACE/ARB at this time.  Checking 24 hr protein/creat clear and serologies.   Will need close  f/u with Renal as outpt. Will consult SW to assist.  Follow labs daily.  Renally dose meds, avoid nephrotoxins, and monitor I/O's closely.  2. Acc HTN (I12.9): stable so far on current meds.  No ACE/ARB.  Can't diurese degree of edema with underlying nephrotic syn.  Consider changing metoprolol to coreg for antiprotenuric effects. CCB with improvement.   3. Uncontrolled Type 1DM (E10.22, E10.65):  Currently using Freestyle ally.  Would highly recommend insulin pump for better compliance.  Defer to outpt endo.   4. Pregnancy:  High risk of pre-e.  Advised of risk.  Defer to ob team.   5. Anemia of CKD/BALDOMERO/Pregnancy:  No IV iron.  Oral iron for now.   6. Restrictive lung disease:  Follows pulm.     ,Thanks for consult  See above  Will follow along

## 2020-08-05 NOTE — PLAN OF CARE
Patient in no apparent distress. Sat's 100  % on room air. PRN MDI nto needed . Will continue to monitor.

## 2020-08-05 NOTE — CARE UPDATE
Repeat BG after 10 units IV insulin 391  Plan to administer 8 IV and recheck 15 minutes post   Recheck potassium following    Elisabet Anglin MD  OBGYN, PGY-3

## 2020-08-05 NOTE — NURSING
"Results for SIXTO CARNEY (MRN [id number]) as of 8/5/2020 15:27   Ref. Range 8/5/2020 14:27   POCT Glucose Latest Ref Range: 70 - 110 mg/dL 130 (H)   Accucheck  done due to patient stateing she feels like her glucose was "low."  " no chest pain, no cough, and no shortness of breath.

## 2020-08-05 NOTE — CARE UPDATE
MD to bedside for assessment. Post prandial sugar 469. Patient reports she is feeling well and this is a somewhat normal post prandial sugar for her. She denies any nausea/vomiting or abdominal pain.     CMP ordered   Additional 5 units of insulin given   Recheck BG in one hour     Elisabet Anglin MD  OBGYN, PGY-3

## 2020-08-05 NOTE — SUBJECTIVE & OBJECTIVE
Obstetric HPI:  Currently feeling well with no complaints. Denies any bleeding or abdominal pain.     OB History    Para Term  AB Living   1 0 0 0 0 0   SAB TAB Ectopic Multiple Live Births   0 0 0 0 0      # Outcome Date GA Lbr Chalo/2nd Weight Sex Delivery Anes PTL Lv   1 Current              Past Medical History:   Diagnosis Date    Anemia     Chronic kidney disease     Diabetes mellitus     Hypertension     Nephrotic syndrome     Restrictive lung disease      No past surgical history on file.    PTA Medications   Medication Sig    albuterol (VENTOLIN HFA) 90 mcg/actuation inhaler Inhale into the lungs.    blood sugar diagnostic Strp To check BG 4 - 6 times daily, to use with insurance preferred meter    blood-glucose meter kit To check BG 4 times daily, to use with insurance preferred meter    citalopram (CELEXA) 20 MG tablet Take 1 tablet (20 mg total) by mouth once daily.    ferrous gluconate (FERGON) 324 MG tablet Take 324 mg by mouth daily with breakfast.    flash glucose scanning reader (FREESTYLE MARCY 14 DAY READER) Misc 1 each by Misc.(Non-Drug; Combo Route) route once daily.    flash glucose sensor (FREESTYLE MARCY 14 DAY SENSOR) Kit Route 1 every fourteen (fourteen ) days.    fluocinolone and shower cap (DERMA-SMOOTHE/FS SCALP OIL) 0.01 % Oil Apply oil to damp scalp nightly and cover with shower cap. (Patient not taking: Reported on 2020)    hydroCHLOROthiazide (HYDRODIURIL) 25 MG tablet     insulin (LANTUS SOLOSTAR U-100 INSULIN) glargine 100 units/mL (3mL) SubQ pen Inject 20 Units into the skin every evening    insulin lispro (HUMALOG KWIKPEN INSULIN) 100 unit/mL pen Inject 10 units into skin the skin 3 three times daily with meals    ketoconazole (NIZORAL) 2 % shampoo Wash hair with medicated shampoo at least 2x/week - let sit on scalp at least 5 minutes prior to rinsing (Patient not taking: Reported on 2020)    lancets Misc To check BG 4 - 6 times daily, to  "use with insurance preferred meter    losartan (COZAAR) 100 MG tablet     losartan-hydrochlorothiazide 100-25 mg (HYZAAR) 100-25 mg per tablet Take 1 tablet by mouth once daily. (Patient not taking: Reported on 7/29/2020)    metoprolol succinate (TOPROL-XL) 50 MG 24 hr tablet Take 1 tablet (50 mg total) by mouth once daily.    norethindrone (ORTHO MICRONOR) 0.35 mg tablet Take 1 tablet (0.35 mg total) by mouth once daily. (Patient not taking: Reported on 7/29/2020)    pen needle, diabetic 32 gauge x 5/32" Ndle For three times daily injection (Patient not taking: Reported on 8/4/2020)       Review of patient's allergies indicates:  No Known Allergies     Family History     Problem Relation (Age of Onset)    Heart disease Father        Tobacco Use    Smoking status: Never Smoker    Smokeless tobacco: Never Used   Substance and Sexual Activity    Alcohol use: No    Drug use: No    Sexual activity: Yes     Partners: Male     Comment: monogamous     Review of Systems   Constitutional: Negative for activity change, appetite change, chills and fever.   Eyes: Negative for visual disturbance.   Respiratory: Negative for cough and shortness of breath.    Cardiovascular: Negative for chest pain.   Gastrointestinal: Negative for abdominal pain, constipation, nausea and vomiting.   Genitourinary: Negative for dysuria, frequency, hematuria, pelvic pain, urgency, vaginal bleeding, vaginal discharge and vaginal odor.   Musculoskeletal: Negative for myalgias.   Integumentary:  Negative for rash.   Neurological: Negative for headaches.   Psychiatric/Behavioral: Negative for depression and sleep disturbance. The patient is not nervous/anxious.       Objective:     Vital Signs (Most Recent):  Temp: 98.4 °F (36.9 °C) (08/05/20 0800)  Pulse: 94 (08/05/20 0759)  Resp: 16 (08/05/20 0429)  BP: 127/60 (08/05/20 0800)  SpO2: 100 % (08/05/20 0429) Vital Signs (24h Range):  Temp:  [98.2 °F (36.8 °C)-98.4 °F (36.9 °C)] 98.4 °F (36.9 " °C)  Pulse:  [] 94  Resp:  [16-20] 16  SpO2:  [98 %-100 %] 100 %  BP: (124-236)/() 127/60     Weight: 69.9 kg (154 lb)  Body mass index is 26.43 kg/m².    Physical Exam:   Constitutional: She is oriented to person, place, and time. She appears well-developed and well-nourished. No distress.    HENT:   Head: Normocephalic and atraumatic.   Nose: Nose normal.    Eyes: Conjunctivae are normal. Right eye exhibits no discharge. Left eye exhibits no discharge. No scleral icterus.    Neck: Normal range of motion. Neck supple.    Cardiovascular: Normal rate, regular rhythm, normal heart sounds and intact distal pulses.  Exam reveals no clubbing and no edema.     Pulmonary/Chest: Effort normal and breath sounds normal. No respiratory distress. She has no wheezes.        Abdominal: Soft. She exhibits no mass. There is no abdominal tenderness. There is no rebound.             Musculoskeletal: Normal range of motion and moves all extremeties. No tenderness or edema.       Neurological: She is alert and oriented to person, place, and time.    Skin: Skin is warm and dry. No rash noted. She is not diaphoretic. No erythema. Nails show no clubbing.    Psychiatric: She has a normal mood and affect. Her behavior is normal. Judgment and thought content normal.       Cervix: deferred     Significant Labs:  Lab Results   Component Value Date    GROUPTRH O POS 07/29/2020    HEPBSAG Negative 07/29/2020

## 2020-08-05 NOTE — TELEPHONE ENCOUNTER
Reached out to patient , rescheduled her appointment with Dr. Mcknight for August 17,2020 at 1pm.      ----- Message from Elaina Millan MA sent at 8/5/2020  8:34 AM CDT -----  Good morning, can this patient be rescheduled for her NP cardiology/ EKG appointment.    Thank you

## 2020-08-05 NOTE — PROGRESS NOTES
Received to Room 395 ambulatory with significant other. No c/o headache, no burry vision. Notified Dr. Campa of admission. [id number]. Dr. Anglin notified

## 2020-08-05 NOTE — ASSESSMENT & PLAN NOTE
- stage III CKD 2/2 diabetes; pt unaware of severity of disease  - baseline Cr 1.8; now 2.2   - nephrology consulted

## 2020-08-05 NOTE — ASSESSMENT & PLAN NOTE
- patient with chronic hypertension   - previously on losartan-hctz combination pill however was told recently to stop   - BP on admit 200s/100s sustained   - IV antihypertensive administered with improvement of BP  - oral metoprolol, hctz and procardia 30 initiated for BP control   - will likely need continue titration   - nephrology consulted; advised to consider changing metoprolol to coreg for antiprotenuric effects  - EKG NSR w/ possible LV enlargement  - Echo ordered

## 2020-08-05 NOTE — PLAN OF CARE
Blood sugar patterning in progress throughout shift. See MAR for insulin administration. Blood pressure medication administered per order as well. BP now WNL. See MAR for medication administration. All other VSS. Pt denies any pain. 24 hour urine in process. Call light within reach. Pt instructed to call RN if any assistance is needed. Will continue to monitor patient for remainder of shift.

## 2020-08-06 ENCOUNTER — TELEPHONE (OUTPATIENT)
Dept: OBSTETRICS AND GYNECOLOGY | Facility: CLINIC | Age: 25
End: 2020-08-06

## 2020-08-06 VITALS
DIASTOLIC BLOOD PRESSURE: 72 MMHG | WEIGHT: 154 LBS | HEART RATE: 90 BPM | BODY MASS INDEX: 25.66 KG/M2 | TEMPERATURE: 99 F | RESPIRATION RATE: 18 BRPM | OXYGEN SATURATION: 100 % | SYSTOLIC BLOOD PRESSURE: 140 MMHG | HEIGHT: 65 IN

## 2020-08-06 PROBLEM — D64.9 ANEMIA: Status: RESOLVED | Noted: 2019-02-25 | Resolved: 2020-08-06

## 2020-08-06 LAB
ALBUMIN SERPL BCP-MCNC: 1.7 G/DL (ref 3.5–5.2)
ANION GAP SERPL CALC-SCNC: 8 MMOL/L (ref 8–16)
BUN SERPL-MCNC: 33 MG/DL (ref 6–20)
BUN SERPL-MCNC: 36 MG/DL (ref 6–20)
BUN SERPL-MCNC: 36 MG/DL (ref 6–20)
BUN SERPL-MCNC: 37 MG/DL (ref 6–20)
C3 SERPL-MCNC: 145 MG/DL (ref 50–180)
C4 SERPL-MCNC: 52 MG/DL (ref 11–44)
CALCIUM SERPL-MCNC: 8 MG/DL (ref 8.7–10.5)
CALCIUM SERPL-MCNC: 8.3 MG/DL (ref 8.7–10.5)
CHLORIDE SERPL-SCNC: 109 MMOL/L (ref 95–110)
CHLORIDE SERPL-SCNC: 109 MMOL/L (ref 95–110)
CHLORIDE SERPL-SCNC: 110 MMOL/L (ref 95–110)
CHLORIDE SERPL-SCNC: 110 MMOL/L (ref 95–110)
CO2 SERPL-SCNC: 19 MMOL/L (ref 23–29)
CO2 SERPL-SCNC: 20 MMOL/L (ref 23–29)
CREAT SERPL-MCNC: 2 MG/DL (ref 0.5–1.4)
CREAT SERPL-MCNC: 2.1 MG/DL (ref 0.5–1.4)
CREAT SERPL-MCNC: 2.1 MG/DL (ref 0.5–1.4)
CREAT SERPL-MCNC: 2.2 MG/DL (ref 0.5–1.4)
EST. GFR  (AFRICAN AMERICAN): 35 ML/MIN/1.73 M^2
EST. GFR  (AFRICAN AMERICAN): 37 ML/MIN/1.73 M^2
EST. GFR  (AFRICAN AMERICAN): 37 ML/MIN/1.73 M^2
EST. GFR  (AFRICAN AMERICAN): 39 ML/MIN/1.73 M^2
EST. GFR  (NON AFRICAN AMERICAN): 30 ML/MIN/1.73 M^2
EST. GFR  (NON AFRICAN AMERICAN): 32 ML/MIN/1.73 M^2
EST. GFR  (NON AFRICAN AMERICAN): 32 ML/MIN/1.73 M^2
EST. GFR  (NON AFRICAN AMERICAN): 34 ML/MIN/1.73 M^2
GLUCOSE SERPL-MCNC: 110 MG/DL (ref 70–110)
GLUCOSE SERPL-MCNC: 110 MG/DL (ref 70–110)
GLUCOSE SERPL-MCNC: 113 MG/DL (ref 70–110)
GLUCOSE SERPL-MCNC: 88 MG/DL (ref 70–110)
PHOSPHATE SERPL-MCNC: 5.2 MG/DL (ref 2.7–4.5)
POCT GLUCOSE: 104 MG/DL (ref 70–110)
POCT GLUCOSE: 155 MG/DL (ref 70–110)
POCT GLUCOSE: 69 MG/DL (ref 70–110)
POCT GLUCOSE: 71 MG/DL (ref 70–110)
POCT GLUCOSE: 73 MG/DL (ref 70–110)
POTASSIUM SERPL-SCNC: 4.3 MMOL/L (ref 3.5–5.1)
POTASSIUM SERPL-SCNC: 4.3 MMOL/L (ref 3.5–5.1)
POTASSIUM SERPL-SCNC: 4.4 MMOL/L (ref 3.5–5.1)
POTASSIUM SERPL-SCNC: 4.6 MMOL/L (ref 3.5–5.1)
RPR SER QL: NORMAL
SODIUM SERPL-SCNC: 136 MMOL/L (ref 136–145)
SODIUM SERPL-SCNC: 137 MMOL/L (ref 136–145)
SODIUM SERPL-SCNC: 138 MMOL/L (ref 136–145)
SODIUM SERPL-SCNC: 138 MMOL/L (ref 136–145)

## 2020-08-06 PROCEDURE — 25000003 PHARM REV CODE 250: Performed by: NURSE PRACTITIONER

## 2020-08-06 PROCEDURE — 94761 N-INVAS EAR/PLS OXIMETRY MLT: CPT

## 2020-08-06 PROCEDURE — 99238 HOSP IP/OBS DSCHRG MGMT 30/<: CPT | Mod: ,,, | Performed by: OBSTETRICS & GYNECOLOGY

## 2020-08-06 PROCEDURE — 99238 PR HOSPITAL DISCHARGE DAY,<30 MIN: ICD-10-PCS | Mod: ,,, | Performed by: OBSTETRICS & GYNECOLOGY

## 2020-08-06 PROCEDURE — 80048 BASIC METABOLIC PNL TOTAL CA: CPT

## 2020-08-06 PROCEDURE — 25000003 PHARM REV CODE 250: Performed by: STUDENT IN AN ORGANIZED HEALTH CARE EDUCATION/TRAINING PROGRAM

## 2020-08-06 PROCEDURE — 63600175 PHARM REV CODE 636 W HCPCS: Performed by: STUDENT IN AN ORGANIZED HEALTH CARE EDUCATION/TRAINING PROGRAM

## 2020-08-06 PROCEDURE — 99900035 HC TECH TIME PER 15 MIN (STAT)

## 2020-08-06 PROCEDURE — 80069 RENAL FUNCTION PANEL: CPT

## 2020-08-06 PROCEDURE — 36415 COLL VENOUS BLD VENIPUNCTURE: CPT

## 2020-08-06 RX ORDER — ENOXAPARIN SODIUM 100 MG/ML
40 INJECTION SUBCUTANEOUS DAILY
Qty: 12 ML | Refills: 5 | Status: SHIPPED | OUTPATIENT
Start: 2020-08-06 | End: 2020-08-10 | Stop reason: SDUPTHER

## 2020-08-06 RX ORDER — FERROUS SULFATE 325(65) MG
325 TABLET, DELAYED RELEASE (ENTERIC COATED) ORAL DAILY
Qty: 60 TABLET | Refills: 3 | Status: SHIPPED | OUTPATIENT
Start: 2020-08-07 | End: 2020-08-17

## 2020-08-06 RX ORDER — ENOXAPARIN SODIUM 100 MG/ML
40 INJECTION SUBCUTANEOUS EVERY 24 HOURS
Status: DISCONTINUED | OUTPATIENT
Start: 2020-08-06 | End: 2020-08-06 | Stop reason: HOSPADM

## 2020-08-06 RX ORDER — ERGOCALCIFEROL 1.25 MG/1
50000 CAPSULE ORAL
Status: DISCONTINUED | OUTPATIENT
Start: 2020-08-06 | End: 2020-08-06 | Stop reason: HOSPADM

## 2020-08-06 RX ADMIN — METOPROLOL SUCCINATE 50 MG: 25 TABLET, EXTENDED RELEASE ORAL at 09:08

## 2020-08-06 RX ADMIN — ENOXAPARIN SODIUM 40 MG: 40 INJECTION SUBCUTANEOUS at 09:08

## 2020-08-06 RX ADMIN — HYDROCHLOROTHIAZIDE 25 MG: 25 TABLET ORAL at 09:08

## 2020-08-06 RX ADMIN — INSULIN ASPART 10 UNITS: 100 INJECTION, SOLUTION INTRAVENOUS; SUBCUTANEOUS at 09:08

## 2020-08-06 RX ADMIN — PRENATAL VIT W/ FE FUMARATE-FA TAB 27-0.8 MG 1 TABLET: 27-0.8 TAB at 09:08

## 2020-08-06 RX ADMIN — FERROUS SULFATE TAB EC 325 MG (65 MG FE EQUIVALENT) 325 MG: 325 (65 FE) TABLET DELAYED RESPONSE at 09:08

## 2020-08-06 RX ADMIN — ERGOCALCIFEROL 50000 UNITS: 1.25 CAPSULE ORAL at 09:08

## 2020-08-06 RX ADMIN — CITALOPRAM HYDROBROMIDE 20 MG: 20 TABLET ORAL at 09:08

## 2020-08-06 NOTE — PROGRESS NOTES
Pt doing well, no acute changes during this shift, vital signs stable, CBG normal range, no signs of distress. Will continue to monitor.

## 2020-08-06 NOTE — PROGRESS NOTES
[facility]  Obstetrics  Antepartum Progress Note    Patient Name: Isabela Read  MRN: [id number]  Admission Date: 2020  Hospital Length of Stay: 2 days  Attending Physician: Francie Alberto MD  Primary Care Provider: Cb Mcghee MD    Subjective:     Principal Problem:Poorly controlled diabetes mellitus    HPI:  Isabela Read is a 25 y.o. F at 9w5d presents as direct admit after being seen in MFM clinic earlier today. Following consultation it was recommended that patient be admitted for glycemic control as well as further evaluation of her multiple medical comobordities.     Patient was diagnosed with T1DM at the age of 8. HbA1c 13.0. She is taking insulin as prescribed. She has not seen DM ed or dietician recently. She eats many carbs - juice, white bread, pasta, etc. Fasting BGs run 200-300, postprandial 300+, bedtime anywhere from 200-500. She has a Jil CGM, does not do fingerstick checks. Doesn't carb count or slide insulin. Only corrects with 2u if BG >150.   She was diagnosed with HTN 10 years ago. Was taking Losartan (stopped 1 week ago), now taking HCTZ and metoprolol. Doesn't have a cardiologist. 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   Was previously treated as an asthmatic with symbicort and albuterol when she was younger. Has seen Rolly with pulmonology, who performed PFTs and CT chest. She carries the diagnosis of restrictive lung disease, on no medications. Reports mild SOB at baseline but no acute changes and is able to tolerate normal daily activities. Unable to view PFT results in Epic.  The patient also has CKD stage III with creatinine 2.2 and baseline P:C 7.88. She has never seen a nephrologist. Her H/H is 7.  She denies any personal/family history of or symptoms of SLE. Did not report family history of kidney disease.    On initial presentation her BG is  245. Her blood pressure is 200s/100s. She is currently feeling well and is entirely asymptomatic.     Hospital Course:  2020: admit for glycemic control and blood pressure monitoring   2020: pt continued to remain asymptomatic. Continue titration insulin for appropriate regimen  2020: Insulin fairly well controlled over past 24 hours on home regimen    Obstetric HPI:  Currently feeling well with no complaints. Denies any bleeding or abdominal pain.     OB History    Para Term  AB Living   1 0 0 0 0 0   SAB TAB Ectopic Multiple Live Births   0 0 0 0 0      # Outcome Date GA Lbr Chalo/2nd Weight Sex Delivery Anes PTL Lv   1 Current              Past Medical History:   Diagnosis Date    Anemia     Chronic kidney disease     Diabetes mellitus     Hypertension     Nephrotic syndrome     Restrictive lung disease      No past surgical history on file.    PTA Medications   Medication Sig    albuterol (VENTOLIN HFA) 90 mcg/actuation inhaler Inhale into the lungs.    blood sugar diagnostic Strp To check BG 4 - 6 times daily, to use with insurance preferred meter    blood-glucose meter kit To check BG 4 times daily, to use with insurance preferred meter    citalopram (CELEXA) 20 MG tablet Take 1 tablet (20 mg total) by mouth once daily.    ferrous gluconate (FERGON) 324 MG tablet Take 324 mg by mouth daily with breakfast.    flash glucose scanning reader (FREESTYLE MARCY 14 DAY READER) Misc 1 each by Misc.(Non-Drug; Combo Route) route once daily.    flash glucose sensor (FREESTYLE MARCY 14 DAY SENSOR) Kit Route 1 every fourteen (fourteen ) days.    fluocinolone and shower cap (DERMA-SMOOTHE/FS SCALP OIL) 0.01 % Oil Apply oil to damp scalp nightly and cover with shower cap. (Patient not taking: Reported on 2020)    hydroCHLOROthiazide (HYDRODIURIL) 25 MG tablet     insulin (LANTUS SOLOSTAR U-100 INSULIN) glargine 100 units/mL (3mL) SubQ pen Inject 20 Units into the skin every  "evening    insulin lispro (HUMALOG KWIKPEN INSULIN) 100 unit/mL pen Inject 10 units into skin the skin 3 three times daily with meals    ketoconazole (NIZORAL) 2 % shampoo Wash hair with medicated shampoo at least 2x/week - let sit on scalp at least 5 minutes prior to rinsing (Patient not taking: Reported on 8/4/2020)    lancets Misc To check BG 4 - 6 times daily, to use with insurance preferred meter    losartan (COZAAR) 100 MG tablet     losartan-hydrochlorothiazide 100-25 mg (HYZAAR) 100-25 mg per tablet Take 1 tablet by mouth once daily. (Patient not taking: Reported on 7/29/2020)    metoprolol succinate (TOPROL-XL) 50 MG 24 hr tablet Take 1 tablet (50 mg total) by mouth once daily.    norethindrone (ORTHO MICRONOR) 0.35 mg tablet Take 1 tablet (0.35 mg total) by mouth once daily. (Patient not taking: Reported on 7/29/2020)    pen needle, diabetic 32 gauge x 5/32" Ndle For three times daily injection (Patient not taking: Reported on 8/4/2020)       Review of patient's allergies indicates:  No Known Allergies     Family History     Problem Relation (Age of Onset)    Heart disease Father        Tobacco Use    Smoking status: Never Smoker    Smokeless tobacco: Never Used   Substance and Sexual Activity    Alcohol use: No    Drug use: No    Sexual activity: Yes     Partners: Male     Comment: monogamous     Review of Systems   Constitutional: Negative for activity change, appetite change, chills and fever.   Eyes: Negative for visual disturbance.   Respiratory: Negative for cough and shortness of breath.    Cardiovascular: Negative for chest pain.   Gastrointestinal: Negative for abdominal pain, constipation, nausea and vomiting.   Genitourinary: Negative for dysuria, frequency, hematuria, pelvic pain, urgency, vaginal bleeding, vaginal discharge and vaginal odor.   Musculoskeletal: Negative for myalgias.   Integumentary:  Negative for rash.   Neurological: Negative for headaches. "   Psychiatric/Behavioral: Negative for depression and sleep disturbance. The patient is not nervous/anxious.       Objective:     Vital Signs (Most Recent):  Temp: 98.1 °F (36.7 °C) (20)  Pulse: 97 (20 05)  Resp: 18 (20)  BP: (!) 149/79 (20)  SpO2: 99 % (20) Vital Signs (24h Range):  Temp:  [96.4 °F (35.8 °C)-98.4 °F (36.9 °C)] 98.1 °F (36.7 °C)  Pulse:  [] 97  Resp:  [16-18] 18  SpO2:  [92 %-100 %] 99 %  BP: (124-180)/(60-96) 149/79     Weight: 69.9 kg (154 lb)  Body mass index is 26.43 kg/m².    Physical Exam:   Constitutional: She is oriented to person, place, and time. She appears well-developed and well-nourished. No distress.    HENT:   Head: Normocephalic and atraumatic.   Nose: Nose normal.    Eyes: Conjunctivae are normal. Right eye exhibits no discharge. Left eye exhibits no discharge. No scleral icterus.    Neck: Normal range of motion. Neck supple.    Cardiovascular: Normal rate, regular rhythm, normal heart sounds and intact distal pulses.  Exam reveals no clubbing and no edema.     Pulmonary/Chest: Effort normal and breath sounds normal. No respiratory distress. She has no wheezes.        Abdominal: Soft. She exhibits no mass. There is no abdominal tenderness. There is no rebound.             Musculoskeletal: Normal range of motion and moves all extremeties. No tenderness or edema.       Neurological: She is alert and oriented to person, place, and time.    Skin: Skin is warm and dry. No rash noted. She is not diaphoretic. No erythema. Nails show no clubbing.    Psychiatric: She has a normal mood and affect. Her behavior is normal. Judgment and thought content normal.       Cervix: deferred     Significant Labs:  Lab Results   Component Value Date    GROUPTRH O POS 2020    HEPBSAG Negative 2020         Assessment/Plan:     25 y.o. female  at 10w0d for:    * Poorly controlled diabetes mellitus  - type I DM   - today's a1c 14  -  Random glucose on admit elevated, requiring multiple additional pushes of insulin (30u overnight)  - home regimen lantus 20 nightly with 10 units with meals daily    - continue Lantus 20 qhs and 10/10/10  - will closely monitor BG in house and titrate insulin as needed with SSI  - 2hr PP and fasting since admit have been within goal  - It appears that pt's home regimen is appropriate, but pt quite noncompliant with diet and meds.  - EKG and echo both showed possible L ventricular enlargement, otherwise wnl. Normal EF and pulm pressure.  - please see MFM consult note for additional counseling documentation   - Diabetic education pending      Restrictive lung disease  - patient takes ventolin at home   - continued inpatient   - diagnosis of restrictive lung disease   - has previously seen Dr. Lofton with pulmonology and has had PFTs performed   - per pulm consult, no obvious etiology for restrictive lung disease. Also, no treatment currently available  - pt can follow up outpatient, however not urgent as sx are stable and no treatment available    Anemia in pregnancy  - most recent h/h 7.1/22   - MCV 91   - suspect anemia of chronic disease   - kidney disease also likely contributing   - iron studies ordered although MCV is normal   - will consider transfusion    Chronic kidney disease (CKD), stage III (moderate)  - stage III CKD 2/2 diabetes; pt unaware of severity of disease  - baseline Cr 1.8. Stable at 2.1  - nephrology consulted recommended considering changing metoprolol to coreg for antiprotenuric effects  - Nephro again discussed with patient need for complaint w/ meds to avoid worsening of CKD and eventual dialysis  - Add prophylactic Lovenox given increase risk for VTE    Anemia  - Labs studies confirm normocytic anemia, etiology likely CKD vs Fe deficiency  - PO Fe daily added    Dyspnea on exertion  -Previous workup at [facility] showed restriction and a decreased DLCO. Previous CT unremarkable  - Pulm consult in  house reviewed PFTs and CT confirming dx, but unable to give any specific etiology; possibly from cHTN  - Previously on Symbicort w/o relief  - Pt may follow up with [facility] pulm during pregnancy if needed; however, no current tx or therapy is needed at this time       HTN (hypertension)  - patient with chronic hypertension   - previously on losartan-hctz combination pill however was told recently to stop   - BP on admit 200s/100s sustained   - IV antihypertensive administered with improvement of BP  - oral metoprolol, hctz and procardia 30 initiated for BP control   - will likely need continue titration   - nephrology consulted; advised to consider changing metoprolol to coreg for antiprotenuric effects  - EKG NSR w/ possible LV enlargement  - Echo ordered    ARMIN Cárdenas MD  OBGYN PGY2

## 2020-08-06 NOTE — DISCHARGE SUMMARY
[facility]  Obstetrics  Discharge Summary      Patient Name: Isabela Read  MRN: [id number]  Admission Date: 2020  Hospital Length of Stay: 2 days  Discharge Date and Time:  2020 9:57 AM  Attending Physician: Francie Alberto MD   Discharging Provider: Florentin Cárdenas MD   Primary Care Provider: Cb Mcghee MD    HPI: Isabela Read is a 25 y.o. F at 9w5d presents as direct admit after being seen in MFM clinic earlier today. Following consultation it was recommended that patient be admitted for glycemic control as well as further evaluation of her multiple medical comobordities.     Patient was diagnosed with T1DM at the age of 8. HbA1c 13.0. She is taking insulin as prescribed. She has not seen DM ed or dietician recently. She eats many carbs - juice, white bread, pasta, etc. Fasting BGs run 200-300, postprandial 300+, bedtime anywhere from 200-500. She has a Jil CGM, does not do fingerstick checks. Doesn't carb count or slide insulin. Only corrects with 2u if BG >150.   She was diagnosed with HTN 10 years ago. Was taking Losartan (stopped 1 week ago), now taking HCTZ and metoprolol. Doesn't have a cardiologist. 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   Was previously treated as an asthmatic with symbicort and albuterol when she was younger. Has seen Rolly with pulmonology, who performed PFTs and CT chest. She carries the diagnosis of restrictive lung disease, on no medications. Reports mild SOB at baseline but no acute changes and is able to tolerate normal daily activities. Unable to view PFT results in Epic.  The patient also has CKD stage III with creatinine 2.2 and baseline P:C 7.88. She has never seen a nephrologist. Her H/H is 7.  She denies any personal/family history of or symptoms of SLE. Did not report family history of kidney disease.    On initial  presentation her BG is 245. Her blood pressure is 200s/100s. She is currently feeling well and is entirely asymptomatic.       * No surgery found *     Hospital Course:   08/04/2020: admit for glycemic control and blood pressure monitoring   08/05/2020: pt continued to remain asymptomatic. Continue titration insulin for appropriate regimen  08/06/2020: Insulin fairly well controlled over past 24 hours on home regimen     Pt discharged with good control of blood glucose on initial regimen. No increase was needed. Pt seen by Pulm and Nephro inpatient. Please see their consult notes for details. Pt to follow up with MFM, endocrine, and primary OB following discharge     Consults (From admission, onward)        Status Ordering Provider     Inpatient consult to Diabetes educator  Once     Provider:  (Not yet assigned)    Acknowledged LILLI MENDEZ     Inpatient consult to Nephrology  Once     Provider:  (Not yet assigned)    Completed LILLI MENDEZ     Inpatient consult to Pulmonology  Once     Provider:  (Not yet assigned)    Completed LILLI MENDEZ          Final Active Diagnoses:    Diagnosis Date Noted POA    PRINCIPAL PROBLEM:  Poorly controlled diabetes mellitus [E11.65] 08/04/2020 Yes    Restrictive lung disease [J98.4] 08/04/2020 Yes    Anemia in pregnancy [O99.019] 07/29/2020 Yes    Chronic kidney disease (CKD), stage III (moderate) [N18.3] 10/23/2019 Yes    Dyspnea on exertion [R06.09] 01/05/2016 Yes    HTN (hypertension) [I10] 05/07/2015 Yes      Problems Resolved During this Admission:    Diagnosis Date Noted Date Resolved POA    Anemia [D64.9] 02/25/2019 08/06/2020 Yes        Significant Diagnostic Studies: Labs: All labs within the past 24 hours have been reviewed      Immunizations     None          This patient has no babies on file.  Pending Diagnostic Studies:     Procedure Component Value Units Date/Time    ASHLEY Screen w/Reflex [[id number]] Collected: 08/05/20 1050     Order Status: Sent Lab Status: In process Updated: 08/05/20 1552    Specimen: Blood     Anti-neutrophilic cytoplasmic antibody [[id number]] Collected: 08/05/20 1050    Order Status: Sent Lab Status: In process Updated: 08/05/20 1325    Specimen: Blood     Antistreptolysin O titer [[id number]] Collected: 08/05/20 1050    Order Status: Sent Lab Status: In process Updated: 08/05/20 1547    Specimen: Blood     Glomerular basement membrane antibodies [[id number]] Collected: 08/05/20 1050    Order Status: Sent Lab Status: In process Updated: 08/05/20 1834    Specimen: Blood     Phospholipase A2 Receptor AB, Serum [[id number]] Collected: 08/05/20 1050    Order Status: Sent Lab Status: In process Updated: 08/05/20 1544    Specimen: Blood     RPR [[id number]] Collected: 08/05/20 1050    Order Status: Sent Lab Status: In process Updated: 08/05/20 1056    Specimen: Blood           Discharged Condition: good    Disposition: Home or Self Care    Follow Up:  Follow-up Information     Luciana Mayo MD In 2 weeks.    Specialty: Endocrinology  Why: Follow up from hospital admission  Contact information:  [address] [facility] [address]  [phone]             Marion Jeffrey MD In 4 weeks.    Specialty: Obstetrics and Gynecology  Why: Follow up from hospital admission  Contact information:  [address] [facility] [id number] [phone]             Francie Alberto MD. Schedule an appointment as soon as possible for a visit in 1 week.    Specialty: Maternal and Fetal Medicine  Why: Follow up from hospital admission  Contact information:  [address] [facility] [id number] [phone]                 Patient Instructions:      Diet Adult Regular     Pelvic Rest     Notify your health care provider if you experience any of the following:  temperature >100.4     Notify your health care provider if you experience any of the following:  persistent nausea and vomiting or diarrhea     Notify your health care  provider if you experience any of the following:  severe uncontrolled pain     Notify your health care provider if you experience any of the following:  redness, tenderness, or signs of infection (pain, swelling, redness, odor or green/yellow discharge around incision site)     Notify your health care provider if you experience any of the following:  difficulty breathing or increased cough     Notify your health care provider if you experience any of the following:  severe persistent headache     Notify your health care provider if you experience any of the following:  worsening rash     Activity as tolerated     Medications:  Current Discharge Medication List      START taking these medications    Details   enoxaparin (LOVENOX) 40 mg/0.4 mL Syrg Inject 0.4 mLs (40 mg total) into the skin once daily.  Qty: 12 mL, Refills: 5      ferrous sulfate 325 (65 FE) MG EC tablet Take 1 tablet (325 mg total) by mouth once daily.  Qty: 60 tablet, Refills: 3         CONTINUE these medications which have NOT CHANGED    Details   albuterol (VENTOLIN HFA) 90 mcg/actuation inhaler Inhale into the lungs.      blood sugar diagnostic Strp To check BG 4 - 6 times daily, to use with insurance preferred meter  Qty: 150 each, Refills: 11    Associated Diagnoses: Type 1 diabetes mellitus with stage 3 chronic kidney disease; Microalbuminuria; Diabetic peripheral neuropathy associated with type 2 diabetes mellitus      blood-glucose meter kit To check BG 4 times daily, to use with insurance preferred meter  Qty: 1 each, Refills: 1    Associated Diagnoses: Type 1 diabetes mellitus with stage 3 chronic kidney disease; Microalbuminuria; Diabetic peripheral neuropathy associated with type 2 diabetes mellitus      citalopram (CELEXA) 20 MG tablet Take 1 tablet (20 mg total) by mouth once daily.  Qty: 90 tablet, Refills: 0    Associated Diagnoses: Anxiety and depression      ferrous gluconate (FERGON) 324 MG tablet Take 324 mg by mouth daily with  breakfast.      flash glucose scanning reader (FREESTYLE MARCY 14 DAY READER) Misc 1 each by Misc.(Non-Drug; Combo Route) route once daily.  Qty: 1 each, Refills: 0    Associated Diagnoses: Type 1 diabetes mellitus with stage 3 chronic kidney disease      flash glucose sensor (FREESTYLE MARCY 14 DAY SENSOR) Kit Route 1 every fourteen (fourteen ) days.  Qty: 2 kit, Refills: 11    Associated Diagnoses: Type 1 diabetes mellitus with stage 3 chronic kidney disease      fluocinolone and shower cap (DERMA-SMOOTHE/FS SCALP OIL) 0.01 % Oil Apply oil to damp scalp nightly and cover with shower cap.  Qty: 1 Bottle, Refills: 3    Associated Diagnoses: Psoriasiform dermatitis      hydroCHLOROthiazide (HYDRODIURIL) 25 MG tablet     Comments: .      insulin (LANTUS SOLOSTAR U-100 INSULIN) glargine 100 units/mL (3mL) SubQ pen Inject 20 Units into the skin every evening  Qty: 15 mL, Refills: 6      insulin lispro (HUMALOG KWIKPEN INSULIN) 100 unit/mL pen Inject 10 units into skin the skin 3 three times daily with meals  Qty: 15 mL, Refills: 6      ketoconazole (NIZORAL) 2 % shampoo Wash hair with medicated shampoo at least 2x/week - let sit on scalp at least 5 minutes prior to rinsing  Qty: 120 mL, Refills: 5    Associated Diagnoses: Psoriasiform dermatitis      lancets Misc To check BG 4 - 6 times daily, to use with insurance preferred meter  Qty: 150 each, Refills: 11    Associated Diagnoses: Type 1 diabetes mellitus with stage 3 chronic kidney disease; Microalbuminuria; Diabetic peripheral neuropathy associated with type 2 diabetes mellitus      losartan (COZAAR) 100 MG tablet     Comments: .      losartan-hydrochlorothiazide 100-25 mg (HYZAAR) 100-25 mg per tablet Take 1 tablet by mouth once daily.  Qty: 90 tablet, Refills: 0    Comments: .  Associated Diagnoses: Essential hypertension      metoprolol succinate (TOPROL-XL) 50 MG 24 hr tablet Take 1 tablet (50 mg total) by mouth once daily.  Qty: 90 tablet, Refills: 0     "Comments: .  Associated Diagnoses: Essential hypertension; Palpitations      norethindrone (ORTHO MICRONOR) 0.35 mg tablet Take 1 tablet (0.35 mg total) by mouth once daily.  Qty: 90 tablet, Refills: 3      pen needle, diabetic 32 gauge x 5/32" Ndle For three times daily injection  Qty: 100 each, Refills: 11    Associated Diagnoses: Type 1 diabetes mellitus with stage 3 chronic kidney disease             ARMIN Cárdenas MD  OBGYN PGY2   "

## 2020-08-06 NOTE — CONSULTS
Pt seen by diabetes educator. Discussed current A1C and importance of getting tightly controlled quickly. Has Freestyle Jayda - reviewed BG goals during pregnancy and rationale for tight control.     Pt states she sees Dr. Mayo, endocrinology for management. Missed early July follow up r/t starting back at work. Does not recall last appointment.     Does have Freestyle Jayda, encouraged to scan before and after meals to monitor BG and work towards engagement in DM management.     Discussed home insulin regime and possible issues w/ compliance at home. Pt states she has been having reduced appetite, and will skip meals but then overeat the 1 meal per day. Began ed on importance of consistent cho eating in pregnancy to support growth/development and BG control. Provided pt w/ new cho list and reviewed cho restriction in pregnancy - encouraged to limit cho servings to 30-45gm per meal. Pt knows she snacks frequently, often on fruit, and does not dose insulin for snacks.    Pt asked about cho ratio dosing for novolog - discussed importance of focusing on cho counting first to be able to accurately dose cho ratio.     Pt would benefit tremendously from frequent outpatient DM education follow up. Initial appointment scheduled for 8/11/20 at 1pm prior to MFM consult. Program contact information left with patient. All questions answered at this time.

## 2020-08-06 NOTE — SUBJECTIVE & OBJECTIVE
Obstetric HPI:  Currently feeling well with no complaints. Denies any bleeding or abdominal pain.     OB History    Para Term  AB Living   1 0 0 0 0 0   SAB TAB Ectopic Multiple Live Births   0 0 0 0 0      # Outcome Date GA Lbr Chalo/2nd Weight Sex Delivery Anes PTL Lv   1 Current              Past Medical History:   Diagnosis Date    Anemia     Chronic kidney disease     Diabetes mellitus     Hypertension     Nephrotic syndrome     Restrictive lung disease      No past surgical history on file.    PTA Medications   Medication Sig    albuterol (VENTOLIN HFA) 90 mcg/actuation inhaler Inhale into the lungs.    blood sugar diagnostic Strp To check BG 4 - 6 times daily, to use with insurance preferred meter    blood-glucose meter kit To check BG 4 times daily, to use with insurance preferred meter    citalopram (CELEXA) 20 MG tablet Take 1 tablet (20 mg total) by mouth once daily.    ferrous gluconate (FERGON) 324 MG tablet Take 324 mg by mouth daily with breakfast.    flash glucose scanning reader (FREESTYLE MARCY 14 DAY READER) Misc 1 each by Misc.(Non-Drug; Combo Route) route once daily.    flash glucose sensor (FREESTYLE MARCY 14 DAY SENSOR) Kit Route 1 every fourteen (fourteen ) days.    fluocinolone and shower cap (DERMA-SMOOTHE/FS SCALP OIL) 0.01 % Oil Apply oil to damp scalp nightly and cover with shower cap. (Patient not taking: Reported on 2020)    hydroCHLOROthiazide (HYDRODIURIL) 25 MG tablet     insulin (LANTUS SOLOSTAR U-100 INSULIN) glargine 100 units/mL (3mL) SubQ pen Inject 20 Units into the skin every evening    insulin lispro (HUMALOG KWIKPEN INSULIN) 100 unit/mL pen Inject 10 units into skin the skin 3 three times daily with meals    ketoconazole (NIZORAL) 2 % shampoo Wash hair with medicated shampoo at least 2x/week - let sit on scalp at least 5 minutes prior to rinsing (Patient not taking: Reported on 2020)    lancets Misc To check BG 4 - 6 times daily, to  "use with insurance preferred meter    losartan (COZAAR) 100 MG tablet     losartan-hydrochlorothiazide 100-25 mg (HYZAAR) 100-25 mg per tablet Take 1 tablet by mouth once daily. (Patient not taking: Reported on 7/29/2020)    metoprolol succinate (TOPROL-XL) 50 MG 24 hr tablet Take 1 tablet (50 mg total) by mouth once daily.    norethindrone (ORTHO MICRONOR) 0.35 mg tablet Take 1 tablet (0.35 mg total) by mouth once daily. (Patient not taking: Reported on 7/29/2020)    pen needle, diabetic 32 gauge x 5/32" Ndle For three times daily injection (Patient not taking: Reported on 8/4/2020)       Review of patient's allergies indicates:  No Known Allergies     Family History     Problem Relation (Age of Onset)    Heart disease Father        Tobacco Use    Smoking status: Never Smoker    Smokeless tobacco: Never Used   Substance and Sexual Activity    Alcohol use: No    Drug use: No    Sexual activity: Yes     Partners: Male     Comment: monogamous     Review of Systems   Constitutional: Negative for activity change, appetite change, chills and fever.   Eyes: Negative for visual disturbance.   Respiratory: Negative for cough and shortness of breath.    Cardiovascular: Negative for chest pain.   Gastrointestinal: Negative for abdominal pain, constipation, nausea and vomiting.   Genitourinary: Negative for dysuria, frequency, hematuria, pelvic pain, urgency, vaginal bleeding, vaginal discharge and vaginal odor.   Musculoskeletal: Negative for myalgias.   Integumentary:  Negative for rash.   Neurological: Negative for headaches.   Psychiatric/Behavioral: Negative for depression and sleep disturbance. The patient is not nervous/anxious.       Objective:     Vital Signs (Most Recent):  Temp: 98.1 °F (36.7 °C) (08/06/20 0531)  Pulse: 97 (08/06/20 0532)  Resp: 18 (08/06/20 0532)  BP: (!) 149/79 (08/06/20 0532)  SpO2: 99 % (08/06/20 0531) Vital Signs (24h Range):  Temp:  [96.4 °F (35.8 °C)-98.4 °F (36.9 °C)] 98.1 °F (36.7 " °C)  Pulse:  [] 97  Resp:  [16-18] 18  SpO2:  [92 %-100 %] 99 %  BP: (124-180)/(60-96) 149/79     Weight: 69.9 kg (154 lb)  Body mass index is 26.43 kg/m².    Physical Exam:   Constitutional: She is oriented to person, place, and time. She appears well-developed and well-nourished. No distress.    HENT:   Head: Normocephalic and atraumatic.   Nose: Nose normal.    Eyes: Conjunctivae are normal. Right eye exhibits no discharge. Left eye exhibits no discharge. No scleral icterus.    Neck: Normal range of motion. Neck supple.    Cardiovascular: Normal rate, regular rhythm, normal heart sounds and intact distal pulses.  Exam reveals no clubbing and no edema.     Pulmonary/Chest: Effort normal and breath sounds normal. No respiratory distress. She has no wheezes.        Abdominal: Soft. She exhibits no mass. There is no abdominal tenderness. There is no rebound.             Musculoskeletal: Normal range of motion and moves all extremeties. No tenderness or edema.       Neurological: She is alert and oriented to person, place, and time.    Skin: Skin is warm and dry. No rash noted. She is not diaphoretic. No erythema. Nails show no clubbing.    Psychiatric: She has a normal mood and affect. Her behavior is normal. Judgment and thought content normal.       Cervix: deferred     Significant Labs:  Lab Results   Component Value Date    GROUPTRH O POS 07/29/2020    HEPBSAG Negative 07/29/2020

## 2020-08-06 NOTE — ASSESSMENT & PLAN NOTE
- type I DM   - today's a1c 14  - Random glucose on admit elevated, requiring multiple additional pushes of insulin (30u overnight)  - home regimen lantus 20 nightly with 10 units with meals daily    - continue Lantus 20 qhs and 10/10/10  - will closely monitor BG in house and titrate insulin as needed with SSI  - 2hr PP and fasting since admit have been within goal  - It appears that pt's home regimen is appropriate, but pt quite noncompliant with diet and meds.  - EKG and echo both showed possible L ventricular enlargement, otherwise wnl. Normal EF and pulm pressure.  - please see MFM consult note for additional counseling documentation   - Diabetic education pending

## 2020-08-06 NOTE — PROGRESS NOTES
[facility]  Obstetrics  Antepartum Progress Note    Patient Name: Isabela Read  MRN: [id number]  Admission Date: 2020  Hospital Length of Stay: 2 days  Attending Physician: Francie Alberto MD  Primary Care Provider: Cb Mcghee MD    Subjective:     Principal Problem:Poorly controlled diabetes mellitus    HPI:  Isabela Read is a 25 y.o. F at 9w5d presents as direct admit after being seen in MFM clinic earlier today. Following consultation it was recommended that patient be admitted for glycemic control as well as further evaluation of her multiple medical comobordities.     Patient was diagnosed with T1DM at the age of 8. HbA1c 13.0. She is taking insulin as prescribed. She has not seen DM ed or dietician recently. She eats many carbs - juice, white bread, pasta, etc. Fasting BGs run 200-300, postprandial 300+, bedtime anywhere from 200-500. She has a Jil CGM, does not do fingerstick checks. Doesn't carb count or slide insulin. Only corrects with 2u if BG >150.   She was diagnosed with HTN 10 years ago. Was taking Losartan (stopped 1 week ago), now taking HCTZ and metoprolol. Doesn't have a cardiologist. 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   Was previously treated as an asthmatic with symbicort and albuterol when she was younger. Has seen Rolly with pulmonology, who performed PFTs and CT chest. She carries the diagnosis of restrictive lung disease, on no medications. Reports mild SOB at baseline but no acute changes and is able to tolerate normal daily activities. Unable to view PFT results in Epic.  The patient also has CKD stage III with creatinine 2.2 and baseline P:C 7.88. She has never seen a nephrologist. Her H/H is 7.  She denies any personal/family history of or symptoms of SLE. Did not report family history of kidney disease.    On initial presentation her BG is  245. Her blood pressure is 200s/100s. She is currently feeling well and is entirely asymptomatic.     Hospital Course:  2020: admit for glycemic control and blood pressure monitoring   2020: pt continued to remain asymptomatic. Continue titration insulin for appropriate regimen  2020: Insulin fairly well controlled over past 24 hours on home regimen    No new subjective & objective note has been filed under this hospital service since the last note was generated.    Assessment/Plan:     25 y.o. female  at 10w0d for:    * Poorly controlled diabetes mellitus  - type I DM   - today's a1c 14  - Random glucose on admit elevated, requiring multiple additional pushes of insulin (30u overnight)  - home regimen lantus 20 nightly with 10 units with meals daily    - continue Lantus 20 qhs and 10/10/10  - will closely monitor BG in house and titrate insulin as needed with SSI  - 2hr PP and fasting since admit have been within goal  - It appears that pt's home regimen is appropriate, but pt quite noncompliant with diet and meds.  - EKG and echo both showed possible L ventricular enlargement, otherwise wnl. Normal EF and pulm pressure.  - please see M consult note for additional counseling documentation   - Diabetic education pending      Restrictive lung disease  - patient takes ventolin at home   - continued inpatient   - diagnosis of restrictive lung disease   - has previously seen Dr. Lofton with pulmonology and has had PFTs performed   - per pulm consult, no obvious etiology for restrictive lung disease. Also, no treatment currently available  - pt can follow up outpatient, however not urgent as sx are stable and no treatment available    Anemia in pregnancy  - most recent h/h 7.   - MCV 91   - suspect anemia of chronic disease   - kidney disease also likely contributing   - iron studies ordered although MCV is normal   - will consider transfusion    Chronic kidney disease (CKD), stage III  (moderate)  - stage III CKD 2/2 diabetes; pt unaware of severity of disease  - baseline Cr 1.8. Stable at 2.1  - nephrology consulted recommended considering changing metoprolol to coreg for antiprotenuric effects  - Nephro again discussed with patient need for complaint w/ meds to avoid worsening of CKD and eventual dialysis  - Add prophylactic Lovenox given increase risk for VTE    Anemia  - Labs studies confirm normocytic anemia, etiology likely CKD vs Fe deficiency  - PO Fe daily added    Dyspnea on exertion  -[facility] showed restriction and a decreased DLCO. Previous CT unremarkable  - Pulm consult in house reviewed PFTs and CT confirming dx, but unable to give any specific etiology; possibly from cHTN  - Previously on Symbicort w/o relief  - Pt may follow up with [facility] pulm during pregnancy if needed; however, no current tx or therapy is needed at this time       HTN (hypertension)  - patient with chronic hypertension   - previously on losartan-hctz combination pill however was told recently to stop   - BP on admit 200s/100s sustained   - IV antihypertensive administered with improvement of BP  - oral metoprolol, hctz and procardia 30 initiated for BP control   - will likely need continue titration   - nephrology consulted; advised to consider changing metoprolol to coreg for antiprotenuric effects  - EKG NSR w/ possible LV enlargement  - Echo ordered      ARMIN Cárdenas MD  OBGYN PGY2     -------------------------------------  Patient seen and examined. Dinner postprandial within normal limits and fasting slightly elevated.  Temp:  [96.4 °F (35.8 °C)-98.1 °F (36.7 °C)] 97.2 °F (36.2 °C)  Pulse:  [] 85  Resp:  [16-18] 18  SpO2:  [92 %-100 %] 99 %  BP: (126-180)/(64-96) 142/83    Patient appears to be well controlled on home regimen on Lantus 20u qhs and Log 10/10/10. Counseled extensively on compliance with insulin and awaiting diabetic education consult. Discussed diet adherence with  patient and she is aware of the risks in regards to maternal and fetal morbidity and mortality with uncontrolled Diabetes in pregnancy.  Will touch base with patient's endocrinologist, Dr. Mayo for continued management of diabetes. Patient will require weekly blood glucose check ins.    Plan to start on ppx Lovenox daily as she is at high risk of thrombosis given her CKD in the setting of uncontrolled HTN and diabetes. Patient has been counseled and is willing to start Lovenox.    S/p EKG,Echo, 24 hour urine,TSH,Irone studies,Nephro/Pulm consult inpatient.  Plan for discharge today.    Michelle Perez MD  PGY 5  Maternal Fetal Medicine  [facility]

## 2020-08-06 NOTE — ASSESSMENT & PLAN NOTE
-Previous workup at [facility] showed restriction and a decreased DLCO. Previous CT unremarkable  - Pulm consult in house reviewed PFTs and CT confirming dx, but unable to give any specific etiology; possibly from cHTN  - Previously on Symbicort w/o relief  - Pt may follow up with [facility] pulm during pregnancy if needed; however, no current tx or therapy is needed at this time

## 2020-08-06 NOTE — PLAN OF CARE
08/06/20 1405   Discharge Assessment   Assessment Type Discharge Planning Assessment   Confirmed/corrected address and phone number on facesheet? Yes   Assessment information obtained from? Patient   Prior to hospitilization cognitive status: Alert/Oriented   Prior to hospitalization functional status: Independent   Current cognitive status: Alert/Oriented   Current Functional Status: Independent   Lives With sibling(s);parent(s)   Able to Return to Prior Arrangements yes   Equipment Currently Used at Home none   Do you have any problems affording any of your prescribed medications? No   Does the patient have transportation home? Yes   Transportation Anticipated car, drives self   Discharge Plan A Home   Patient/Family in Agreement with Plan yes     Introduced self to pt and explained sw role. Nephrology consulted sw in order to help pt arrange nephrology f/u. Appt scheduled at [facility] for August 17th at 4pm. Pt notified. Should any d/c needs arise, please consult social work. Emotional support provided.     Eula Duarte LCSW    [facility]  [email or web address]    (phone) [phone] or  Kty. [phone]  (fax) [phone]

## 2020-08-06 NOTE — PROGRESS NOTES
"Nephrology  Progress Note    Admit Date: 8/4/2020   LOS: 2 days     SUBJECTIVE:     Follow-up For:  Nephrotic Syn    Interval History:     Uneventful night.  BP/CBG stable.  Explained lab findings to pt and the severity of her CKD.  No CP/SOB.  Anticipating DC today.      Review of Systems:  Constitutional: No fever or chills  Respiratory: No cough or shortness of breath  Cardiovascular: No chest pain or palpitations  Gastrointestinal: No nausea or vomiting  Neurological: No confusion or weakness    OBJECTIVE:     Vital Signs Range (Last 24H):  BP (!) 142/83   Pulse 85   Temp 97.2 °F (36.2 °C)   Resp 18   Ht 5' 4" (1.626 m)   Wt 69.9 kg (154 lb)   LMP  (LMP Unknown) Comment: pt. states last cycle was in May. She is not sure of the exact date.  SpO2 99%   Breastfeeding No   BMI 26.43 kg/m²     Temp:  [96.4 °F (35.8 °C)-98.1 °F (36.7 °C)]   Pulse:  []   Resp:  [16-18]   BP: (126-180)/(64-96)   SpO2:  [92 %-100 %]     I & O (Last 24H):No intake or output data in the 24 hours ending 08/06/20 1037    Physical Exam:  General appearance: Well developed, well nourished  Eyes:  Conjunctivae/corneas clear. PERRL.  Lungs: Normal respiratory effort,   clear to auscultation bilaterally   Heart: Regular rate and rhythm, S1, S2 normal, no murmur, rub or camille.  Abdomen: Soft, non-tender non-distended; bowel sounds normal; no masses,  no organomegaly  Extremities: No cyanosis or clubbing. 3+ edema.    Skin: Skin color, texture, turgor normal. No rashes or lesions  Neurologic: Normal strength and tone. No focal numbness or weakness     Laboratory Data:  No results for input(s): WBC, RBC, HGB, HCT, PLT, MCV, MCH, MCHC in the last 24 hours.    BMP:   Recent Labs   Lab 08/06/20  0530     110     138   K 4.3  4.3     110   CO2 20*  20*   BUN 36*  36*   CREATININE 2.1*  2.1*   CALCIUM 8.0*  8.0*     Lab Results   Component Value Date    CALCIUM 8.0 (L) 08/06/2020    CALCIUM 8.0 (L) 08/06/2020    " PHOS 5.2 (H) 08/06/2020       Lab Results   Component Value Date    .1 (H) 08/05/2020    CALCIUM 8.0 (L) 08/06/2020    CALCIUM 8.0 (L) 08/06/2020    PHOS 5.2 (H) 08/06/2020       No results found for: URICACID    BNP  No results for input(s): BNP, BNPTRIAGEBLO in the last 168 hours.    Medications:  Medication list was reviewed and changes noted under Assessment/Plan.    Diagnostic Results:        ASSESSMENT/PLAN:     1. Slowly improving nonoliguric GEORGE on CKD IIIB-IV with baseline creat 1.9 and nephrotic syn (N18.4, N04.9):  Creat slowly improving with BP/glucose control.  No need for RRT but strong likelihood during pregnancy as 24 hr creat clearance only 21cc/min.  Denies NSAIDS.  US renal in record noted.  Can't use ACE/ARB at this time.  Serologies unremarkable so far.   Will need close f/u with Renal as outpt. Consulted SW to assist.  Follow labs daily.  Renally dose meds, avoid nephrotoxins, and monitor I/O's closely.  2. Acc HTN (I12.9): stable so far on current meds.  No ACE/ARB.  Can't diurese degree of edema with underlying nephrotic syn.  Consider changing metoprolol to coreg for antiprotenuric effects. CCB with improvement.   3. Uncontrolled Type 1DM (E10.22, E10.65):  Currently using Freestyle ally.  Would highly recommend insulin pump for better compliance.  Defer to outpt endo.   4. Pregnancy:  High risk of pre-e and needing HD.  Advised of risk.  Defer to ob team.   5. Anemia of CKD/BALDOMERO/Pregnancy:  No IV iron.  Oral iron for now.   6. Restrictive lung disease:  Follows pulm.   7. Vit D Def:  Ergo weekly. PTH at goal for CKD.        Ok to DC with close f/u OM Renal.

## 2020-08-06 NOTE — ASSESSMENT & PLAN NOTE
- stage III CKD 2/2 diabetes; pt unaware of severity of disease  - baseline Cr 1.8. Stable at 2.1  - nephrology consulted recommended considering changing metoprolol to coreg for antiprotenuric effects  - Nephro again discussed with patient need for complaint w/ meds to avoid worsening of CKD and eventual dialysis  - Add prophylactic Lovenox given increase risk for VTE

## 2020-08-06 NOTE — ASSESSMENT & PLAN NOTE
- Labs studies confirm normocytic anemia, etiology likely CKD vs Fe deficiency  - PO Fe daily added

## 2020-08-07 ENCOUNTER — DOCUMENTATION ONLY (OUTPATIENT)
Dept: MATERNAL FETAL MEDICINE | Facility: CLINIC | Age: 25
End: 2020-08-07

## 2020-08-07 DIAGNOSIS — R76.8 POSITIVE ANA (ANTINUCLEAR ANTIBODY): Primary | ICD-10-CM

## 2020-08-07 LAB
ANA PATTERN 1: NORMAL
ANA SER QL IF: POSITIVE
ANA TITR SER IF: NORMAL {TITER}
BM IGG SER-ACNC: <0.2 U

## 2020-08-07 NOTE — PROGRESS NOTES
Recommend primary ob check APLS workup due to positive ASHLEY. ASHLEY profile is pending. Recommend primary ob refer to rheumatology. Dr. Jeffrey was messaged.

## 2020-08-08 LAB
ANTI SM ANTIBODY: 0.06 RATIO (ref 0–0.99)
ANTI SM/RNP ANTIBODY: 0.07 RATIO (ref 0–0.99)
ANTI-SM INTERPRETATION: NEGATIVE
ANTI-SM/RNP INTERPRETATION: NEGATIVE
ANTI-SSA ANTIBODY: 0.06 RATIO (ref 0–0.99)
ANTI-SSA INTERPRETATION: NEGATIVE
ANTI-SSB ANTIBODY: 0.07 RATIO (ref 0–0.99)
ANTI-SSB INTERPRETATION: NEGATIVE
DSDNA AB SER-ACNC: NORMAL [IU]/ML

## 2020-08-10 ENCOUNTER — PATIENT OUTREACH (OUTPATIENT)
Dept: ADMINISTRATIVE | Facility: OTHER | Age: 25
End: 2020-08-10

## 2020-08-10 DIAGNOSIS — N28.9 RENAL DISEASE: Primary | ICD-10-CM

## 2020-08-10 LAB
ANCA AB TITR SER IF: NORMAL TITER
ASO AB SERPL-ACNC: 117 IU/ML
P-ANCA TITR SER IF: NORMAL TITER
PHOSPHOLIPASE A2 RECEPTOR, ELISA: <2 RU/ML
PHOSPHOLIPASE A2 RECEPTOR, IFA: NEGATIVE
POCT GLUCOSE: 111 MG/DL (ref 70–110)

## 2020-08-10 RX ORDER — ENOXAPARIN SODIUM 100 MG/ML
40 INJECTION SUBCUTANEOUS DAILY
Qty: 12 ML | Refills: 5 | Status: SHIPPED | OUTPATIENT
Start: 2020-08-10 | End: 2020-08-11

## 2020-08-11 ENCOUNTER — PROCEDURE VISIT (OUTPATIENT)
Dept: MATERNAL FETAL MEDICINE | Facility: CLINIC | Age: 25
End: 2020-08-11
Payer: COMMERCIAL

## 2020-08-11 ENCOUNTER — INITIAL CONSULT (OUTPATIENT)
Dept: MATERNAL FETAL MEDICINE | Facility: CLINIC | Age: 25
End: 2020-08-11
Payer: COMMERCIAL

## 2020-08-11 ENCOUNTER — HOSPITAL ENCOUNTER (EMERGENCY)
Facility: OTHER | Age: 25
Discharge: HOME | End: 2020-08-11
Attending: OBSTETRICS & GYNECOLOGY
Payer: COMMERCIAL

## 2020-08-11 VITALS
SYSTOLIC BLOOD PRESSURE: 170 MMHG | BODY MASS INDEX: 26.52 KG/M2 | WEIGHT: 159.38 LBS | DIASTOLIC BLOOD PRESSURE: 100 MMHG

## 2020-08-11 VITALS
HEART RATE: 91 BPM | SYSTOLIC BLOOD PRESSURE: 144 MMHG | DIASTOLIC BLOOD PRESSURE: 86 MMHG | TEMPERATURE: 98 F | RESPIRATION RATE: 18 BRPM | OXYGEN SATURATION: 100 %

## 2020-08-11 DIAGNOSIS — Z3A.10 10 WEEKS GESTATION OF PREGNANCY: ICD-10-CM

## 2020-08-11 DIAGNOSIS — O24.011 PRE-EXISTING TYPE 1 DIABETES MELLITUS DURING PREGNANCY IN FIRST TRIMESTER: Primary | ICD-10-CM

## 2020-08-11 DIAGNOSIS — O10.919 CHRONIC HYPERTENSION AFFECTING PREGNANCY: ICD-10-CM

## 2020-08-11 DIAGNOSIS — O10.919 CHRONIC HYPERTENSION AFFECTING PREGNANCY: Primary | ICD-10-CM

## 2020-08-11 DIAGNOSIS — O99.011 ANEMIA DURING PREGNANCY IN FIRST TRIMESTER: ICD-10-CM

## 2020-08-11 DIAGNOSIS — E10.22 TYPE 1 DIABETES MELLITUS WITH DIABETIC CHRONIC KIDNEY DISEASE, UNSPECIFIED CKD STAGE: ICD-10-CM

## 2020-08-11 DIAGNOSIS — N18.30 CKD (CHRONIC KIDNEY DISEASE) STAGE 3, GFR 30-59 ML/MIN: ICD-10-CM

## 2020-08-11 DIAGNOSIS — N18.30 CHRONIC KIDNEY DISEASE (CKD), STAGE III (MODERATE): ICD-10-CM

## 2020-08-11 DIAGNOSIS — Z36.9 ENCOUNTER FOR FETAL ULTRASOUND: ICD-10-CM

## 2020-08-11 DIAGNOSIS — Z36.9 ENCOUNTER FOR FETAL ULTRASOUND: Primary | ICD-10-CM

## 2020-08-11 LAB
ALBUMIN SERPL BCP-MCNC: 2.2 G/DL (ref 3.5–5.2)
ALP SERPL-CCNC: 77 U/L (ref 55–135)
ALT SERPL W/O P-5'-P-CCNC: 35 U/L (ref 10–44)
ANION GAP SERPL CALC-SCNC: 9 MMOL/L (ref 8–16)
AST SERPL-CCNC: 45 U/L (ref 10–40)
BASOPHILS # BLD AUTO: 0.02 K/UL (ref 0–0.2)
BASOPHILS NFR BLD: 0.2 % (ref 0–1.9)
BILIRUB SERPL-MCNC: 0.3 MG/DL (ref 0.1–1)
BILIRUB SERPL-MCNC: NEGATIVE MG/DL
BLOOD URINE, POC: 250
BUN SERPL-MCNC: 35 MG/DL (ref 6–20)
CALCIUM SERPL-MCNC: 8.5 MG/DL (ref 8.7–10.5)
CHLORIDE SERPL-SCNC: 106 MMOL/L (ref 95–110)
CO2 SERPL-SCNC: 20 MMOL/L (ref 23–29)
COLOR, POC UA: NORMAL
CREAT SERPL-MCNC: 2 MG/DL (ref 0.5–1.4)
DIFFERENTIAL METHOD: ABNORMAL
EOSINOPHIL # BLD AUTO: 0.1 K/UL (ref 0–0.5)
EOSINOPHIL NFR BLD: 1.5 % (ref 0–8)
ERYTHROCYTE [DISTWIDTH] IN BLOOD BY AUTOMATED COUNT: 12.5 % (ref 11.5–14.5)
EST. GFR  (AFRICAN AMERICAN): 39 ML/MIN/1.73 M^2
EST. GFR  (NON AFRICAN AMERICAN): 34 ML/MIN/1.73 M^2
GLUCOSE SERPL-MCNC: 151 MG/DL (ref 70–110)
GLUCOSE UR QL STRIP: 100
HCT VFR BLD AUTO: 22.2 % (ref 37–48.5)
HGB BLD-MCNC: 7.1 G/DL (ref 12–16)
IMM GRANULOCYTES # BLD AUTO: 0.03 K/UL (ref 0–0.04)
IMM GRANULOCYTES NFR BLD AUTO: 0.3 % (ref 0–0.5)
KETONES UR QL STRIP: NEGATIVE
LEUKOCYTE ESTERASE URINE, POC: NEGATIVE
LYMPHOCYTES # BLD AUTO: 3.2 K/UL (ref 1–4.8)
LYMPHOCYTES NFR BLD: 33.9 % (ref 18–48)
MCH RBC QN AUTO: 29.2 PG (ref 27–31)
MCHC RBC AUTO-ENTMCNC: 32 G/DL (ref 32–36)
MCV RBC AUTO: 91 FL (ref 82–98)
MONOCYTES # BLD AUTO: 0.8 K/UL (ref 0.3–1)
MONOCYTES NFR BLD: 8.4 % (ref 4–15)
NEUTROPHILS # BLD AUTO: 5.2 K/UL (ref 1.8–7.7)
NEUTROPHILS NFR BLD: 55.7 % (ref 38–73)
NITRITE, POC UA: NEGATIVE
NRBC BLD-RTO: 0 /100 WBC
PH, POC UA: 7
PLATELET # BLD AUTO: 393 K/UL (ref 150–350)
PMV BLD AUTO: 10.9 FL (ref 9.2–12.9)
POCT GLUCOSE: 158 MG/DL (ref 70–110)
POTASSIUM SERPL-SCNC: 5.8 MMOL/L (ref 3.5–5.1)
PROT SERPL-MCNC: 6.4 G/DL (ref 6–8.4)
PROTEIN, POC: NORMAL
RBC # BLD AUTO: 2.43 M/UL (ref 4–5.4)
SODIUM SERPL-SCNC: 135 MMOL/L (ref 136–145)
SPECIFIC GRAVITY, POC UA: 1
UROBILINOGEN, POC UA: NORMAL
WBC # BLD AUTO: 9.31 K/UL (ref 3.9–12.7)

## 2020-08-11 PROCEDURE — 99999 PR PBB SHADOW E&M-EST. PATIENT-LVL III: ICD-10-PCS | Mod: PBBFAC,,, | Performed by: OBSTETRICS & GYNECOLOGY

## 2020-08-11 PROCEDURE — 99999 PR PBB SHADOW E&M-EST. PATIENT-LVL III: CPT | Mod: PBBFAC,,, | Performed by: OBSTETRICS & GYNECOLOGY

## 2020-08-11 PROCEDURE — 25000003 PHARM REV CODE 250: Performed by: STUDENT IN AN ORGANIZED HEALTH CARE EDUCATION/TRAINING PROGRAM

## 2020-08-11 PROCEDURE — 85025 COMPLETE CBC W/AUTO DIFF WBC: CPT

## 2020-08-11 PROCEDURE — 99214 OFFICE O/P EST MOD 30 MIN: CPT | Mod: 25,S$GLB,, | Performed by: OBSTETRICS & GYNECOLOGY

## 2020-08-11 PROCEDURE — 99283 EMERGENCY DEPT VISIT LOW MDM: CPT | Mod: 25

## 2020-08-11 PROCEDURE — 82962 GLUCOSE BLOOD TEST: CPT

## 2020-08-11 PROCEDURE — 76815 PR  US,PREGNANT UTERUS,LIMITED, 1/> FETUSES: ICD-10-PCS | Mod: S$GLB,,, | Performed by: OBSTETRICS & GYNECOLOGY

## 2020-08-11 PROCEDURE — 76815 OB US LIMITED FETUS(S): CPT | Mod: S$GLB,,, | Performed by: OBSTETRICS & GYNECOLOGY

## 2020-08-11 PROCEDURE — 80053 COMPREHEN METABOLIC PANEL: CPT

## 2020-08-11 PROCEDURE — 81002 URINALYSIS NONAUTO W/O SCOPE: CPT

## 2020-08-11 PROCEDURE — 99214 PR OFFICE/OUTPT VISIT, EST, LEVL IV, 30-39 MIN: ICD-10-PCS | Mod: 25,S$GLB,, | Performed by: OBSTETRICS & GYNECOLOGY

## 2020-08-11 RX ORDER — NIFEDIPINE 10 MG/1
10 CAPSULE ORAL ONCE
Status: COMPLETED | OUTPATIENT
Start: 2020-08-11 | End: 2020-08-11

## 2020-08-11 RX ORDER — NIFEDIPINE 30 MG/1
30 TABLET, EXTENDED RELEASE ORAL DAILY
Qty: 30 TABLET | Refills: 11 | Status: ON HOLD | OUTPATIENT
Start: 2020-08-11 | End: 2020-11-11 | Stop reason: HOSPADM

## 2020-08-11 RX ORDER — CARVEDILOL 12.5 MG/1
12.5 TABLET ORAL 2 TIMES DAILY
Qty: 60 TABLET | Refills: 11 | Status: ON HOLD | OUTPATIENT
Start: 2020-08-11 | End: 2020-11-11 | Stop reason: HOSPADM

## 2020-08-11 RX ORDER — NIFEDIPINE 30 MG/1
30 TABLET, EXTENDED RELEASE ORAL ONCE
Status: COMPLETED | OUTPATIENT
Start: 2020-08-11 | End: 2020-08-11

## 2020-08-11 RX ORDER — NIFEDIPINE 20 MG/1
20 CAPSULE ORAL ONCE
Status: COMPLETED | OUTPATIENT
Start: 2020-08-11 | End: 2020-08-11

## 2020-08-11 RX ORDER — ENOXAPARIN SODIUM 100 MG/ML
40 INJECTION SUBCUTANEOUS DAILY
Qty: 100 ML | Refills: 3 | Status: ON HOLD | OUTPATIENT
Start: 2020-08-11 | End: 2020-11-11 | Stop reason: HOSPADM

## 2020-08-11 RX ADMIN — NIFEDIPINE 10 MG: 10 CAPSULE ORAL at 04:08

## 2020-08-11 RX ADMIN — NIFEDIPINE 30 MG: 30 TABLET, FILM COATED, EXTENDED RELEASE ORAL at 06:08

## 2020-08-11 RX ADMIN — NIFEDIPINE 20 MG: 20 CAPSULE, LIQUID FILLED ORAL at 05:08

## 2020-08-11 NOTE — ED PROVIDER NOTES
Encounter Date: 2020       History     Chief Complaint   Patient presents with    Hypertension     Isabela Read is a 25 y.o. F at 10w5d who presents from [facility] clinic for severe range BP (170/100). The patient denies any S/S of PreE including headache, scotoma, chest pain, SOB, and RUQ pain.    This IUP is complicated by uncontrolled cHTN, type 1 DM, CKD stage III, and restrictive lung disease. The patient was recently admitted for glucose patterning and serial BP monitoring and was discharged after 24 hours. She was discharged on metoprolol 50 mg, HCTZ 25mg, and Procardia 30 XL for BP control. Unfortunately, it does not appear that the prescription for Procardia was sent and patient has NOT been taking Procardia daily. The patient reports that she has been taking Metoprolol and HCTZ daily as instructed.    Per [facility] recommendations, patient is to follow the following regimen: HCTZ 25 mg daily, Coreg 12.5mg BID, Procardia XL 30mg daily (take procardia at night). D/C Toprolol XL.     Review of patient's allergies indicates:  No Known Allergies  Past Medical History:   Diagnosis Date    Anemia     Chronic kidney disease     Diabetes mellitus     Hypertension     Nephrotic syndrome     Restrictive lung disease      No past surgical history on file.  Family History   Problem Relation Age of Onset    Heart disease Father      Social History     Tobacco Use    Smoking status: Never Smoker    Smokeless tobacco: Never Used   Substance Use Topics    Alcohol use: No    Drug use: No     Review of Systems   Constitutional: Negative for chills and fever.   HENT: Negative for sore throat.    Eyes: Negative for visual disturbance.   Respiratory: Negative for shortness of breath.    Cardiovascular: Negative for chest pain.   Gastrointestinal: Negative for nausea.   Genitourinary: Negative for dysuria and vaginal bleeding.   Musculoskeletal: Negative for back pain.   Skin: Negative for rash.   Neurological:  Negative for headaches.   Psychiatric/Behavioral: Negative for confusion.       Physical Exam     Initial Vitals   BP Pulse Resp Temp SpO2   20 1653 20 1653 20 1653 20 1653 20 1654   (!) 203/111 92 18 98.3 °F (36.8 °C) 100 %      MAP       --                Physical Exam    Constitutional: She appears well-developed and well-nourished.   HENT:   Head: Normocephalic and atraumatic.   Eyes: Conjunctivae and EOM are normal.   Neck: Normal range of motion.   Cardiovascular: Normal rate, regular rhythm and normal heart sounds. Exam reveals no gallop and no friction rub.    No murmur heard.  Pulmonary/Chest: Breath sounds normal. No respiratory distress. She has no wheezes. She has no rhonchi. She has no rales.   Abdominal: Soft. There is no abdominal tenderness.   Genitourinary:    Vagina normal.     Musculoskeletal: No edema.   Neurological: She is alert.   Skin: Skin is warm and dry.         ED Course   Procedures  Labs Reviewed   CBC W/ AUTO DIFFERENTIAL - Abnormal; Notable for the following components:       Result Value    RBC 2.43 (*)     Hemoglobin 7.1 (*)     Hematocrit 22.2 (*)     Platelets 393 (*)     All other components within normal limits   COMPREHENSIVE METABOLIC PANEL - Abnormal; Notable for the following components:    Sodium 135 (*)     Potassium 5.8 (*)     CO2 20 (*)     Glucose 151 (*)     BUN, Bld 35 (*)     Creatinine 2.0 (*)     Calcium 8.5 (*)     Albumin 2.2 (*)     AST 45 (*)     eGFR if  39 (*)     eGFR if non  34 (*)     All other components within normal limits   POCT GLUCOSE - Abnormal; Notable for the following components:    POCT Glucose 158 (*)     All other components within normal limits   POCT URINALYSIS, DIPSTICK OR TABLET REAGENT, AUTOMATED, WITH MICROSCOP          Imaging Results    None          Medical Decision Making:   Initial Assessment:   Isabela Read is a 25 y.o. F at 10w5d presents from [facility] clinic for  severe range BP.    ED Management:  Initial BP severe range at 203/111. POCT glucose 158. PreE labs drawn. Patient asymptomatic.    Procardia 10 and 20 mg given with good response, down to 140s-150s/80s-90s. Patient given Procardia 30 XL for 24-hour dosing, given that she has NOT been taking.    CBC, CMP reviewed. Cr c/w baseline at 2.0. Hyperkalemia noted with K of 5.8; however sample was noted to be moderately hemolyzed. Continued anemia noted with H/h of 7.1/22.2 -- patient reports compliance with oral iron. Per Dr. Luu, will send rx for Fe + Vit C supplement to pharmacy.    Per M recommendations, patient is to follow the following regimen: HCTZ 25 mg daily, Coreg 12.5mg BID, Procardia XL 30mg daily (take procardia at night). D/C Toprolol XL. Discussed this regimen with patient who expressed understanding. Confirmed that Procardia 30 XL had been sent to patient's preferred pharmacy per Dr. Alberto. Patient discharged home in stable condition.    Other:   I have discussed this case with another health care provider.       <> Summary of the Discussion: Dr. Willingham and Dr. Luu                             Clinical Impression:       ICD-10-CM ICD-9-CM   1. Chronic hypertension affecting pregnancy  O10.919 642.00   2. CKD (chronic kidney disease) stage 3, GFR 30-59 ml/min  N18.3 585.3   3. 10 weeks gestation of pregnancy  Z3A.10 V22.2         ED Disposition Condition    Discharge Stable        ED Prescriptions     Medication Sig Dispense Start Date End Date Auth. Provider    iron,carbon,gluc-FA-B12-C-dss (FERRALET 90 DUAL/CITRANATAL BLOOM) 90-1-12-50 mg-mg-mcg-mg Tab Take 1 tablet by mouth once daily. 30 tablet 8/11/2020  Grecia Campa MD        Follow-up Information    None                     Grecia Campa M.D.  OB/GYN PGY-2     Grecia Campa MD  Resident  08/12/20 9922

## 2020-08-11 NOTE — PROGRESS NOTES
Maternal Fetal Medicine follow up consult    SUBJECTIVE:     Isabela Read is a 25 y.o.  female with IUP at 10w5d who is seen in follow up consultation by M.  Pregnancy complications include: HTN, type 1 DM, CKD stage III, restrictive lung disease.    Previous notes reviewed.   No other changes to medical, surgical, family, social, or obstetric history.    Interval history since last [facility] visit: Patient was admitted to the hospital last week, was restarted on home insulin and BP medications. Nephrology saw the patient in consult while in the hospital.  Echo (2020):  · Normal left ventricular systolic function. The estimated ejection fraction is 70%.  · No wall motion abnormalities.  · Mild concentric left ventricular hypertrophy.  · Grade I (mild) left ventricular diastolic dysfunction consistent with impaired relaxation.  · Mild left atrial enlargement.  · Normal right ventricular systolic function.  · The estimated PA systolic pressure is 15 mmHg.  · Normal central venous pressure (3 mmHg).  ·   HbA1c 14.0  Cr 2.2, 24 hour urine 5533mg  H/H     Medications:  Lantus 20u qhs  Humalog 10/10/10  HCTZ  Toprolol XL 50mg daily  Iron  Celexa    Care team members:  Rachele - Primary OB  Slick - nephrology (scheduled on )  Roxanna - cardiology (scheduled on )     OBJECTIVE:     Blood Pressure: 158/70, recheck 170/100, recheck 182/109  Weight: 72kg  Ultrasound performed. See viewpoint for full ultrasound report.  FHTs verified at 162bpm    BG log reviewed  Fasting 96, 160, 65, low (<55), 192, 72  2hr PP breakfast low (>55), 60, 86 199, 131, 132  2hr PP lunch 114, 115, 195, 171, 158  2hr PP dinner 99, 102, 153, 141, 170    ASSESSMENT/PLAN:     25 y.o.  female with IUP at 10w5d     Type 1 DM  On review of patient's CGM, she seems to be having some overnight lows that may contribute to elevated fasting BGs. Also has some PP elevations. Will change insulin to Lantus 18u qhs and Humalog 12. She now  uses a correction dose of 2u per 50 above 150.  Message sent to endocrinologist to re-establish care  Patient advised to call MFM if continued lows    HTN  BPs severely elevated. Will send to [facility] for evaluation and management. (Discussed with Dr. Carmona who indicated patient could go to [facility]. Dr. Willingham aware)  If able to be discharged from [facility], recommend HTN regimen: HCTZ 25 mg daily, Coreg 12.5mg BID, Procardia XL 30mg daily (take procardia at night). D/C Toprolol XL.   Patient scheduled to see cardiology 8/17    CKD stage III with nephrotic syndrome and anemia  Lovenox 40 ppx daily re-sent to pharmacy  Scheduled to see nephrology on 8/17  Taking PO iron, recommend transfusion if anemia becomes symptomatic or near the time of delivery if <7/21  Recommend primary OB order renal U/S to evaluate kidneys  Recommend primary ob repeat CBC and CMP at visit on Monday.    IUP at 10 weeks  ASHLEY was positive - recommend primary OB refer the patient to rheumatology  Patient still deciding about whether she desires aneuploidy screening  APLS labs have been ordered by primary ob.  Pulmonary reviewed patient information in house and saw patient and may need outpatient follow up if any concerns. No concerns today.  Patient scheduled to see MFM on 8/17 for BG review.    Please see viewpoint report for ultrasound.    Time spent in consultation today: 25 minutes, >50% of which was face-to-face time with the patient.    Karely Muse MD  Maternal Fetal Medicine fellow  PGY-6

## 2020-08-11 NOTE — PROGRESS NOTES
Chart reviewed.   Immunizations: Triggered Imm Registry     Orders placed: n/a  Upcoming appts to satisfy NUHA topics: n/a

## 2020-08-12 ENCOUNTER — PATIENT MESSAGE (OUTPATIENT)
Dept: OBSTETRICS AND GYNECOLOGY | Facility: HOSPITAL | Age: 25
End: 2020-08-12

## 2020-08-12 ENCOUNTER — DOCUMENTATION ONLY (OUTPATIENT)
Dept: MATERNAL FETAL MEDICINE | Facility: CLINIC | Age: 25
End: 2020-08-12

## 2020-08-12 ENCOUNTER — TELEPHONE (OUTPATIENT)
Dept: ENDOCRINOLOGY | Facility: CLINIC | Age: 25
End: 2020-08-12

## 2020-08-12 DIAGNOSIS — N18.30 CHRONIC KIDNEY DISEASE (CKD), STAGE III (MODERATE): ICD-10-CM

## 2020-08-12 DIAGNOSIS — Z36.9 ENCOUNTER FOR FETAL ULTRASOUND: Primary | ICD-10-CM

## 2020-08-12 DIAGNOSIS — O10.919 CHRONIC HYPERTENSION AFFECTING PREGNANCY: Primary | ICD-10-CM

## 2020-08-12 NOTE — PROGRESS NOTES
Recommend anemia be followed and as per original consult recommend primary ob consider referral to hematology.     Nephrology aware of lovenox and agree should continue.    Patient also reported yesterday that she has bp cuff at home and Dr. Muse gave her parameters

## 2020-08-12 NOTE — TELEPHONE ENCOUNTER
----- Message from Bibiana Choi MA sent at 8/11/2020  4:50 PM CDT -----    ----- Message -----  From: Karely Muse MD  Sent: 8/11/2020   4:12 PM CDT  To: Maria Esther Pascal,  This patient was seen in the [facility] clinic today. She has poorly controlled diabetes in pregnancy. We had put a referral in to endocrine last week and the patient said no one had contacted her yet to schedule an appointment, but it looks like she had an appointment with Dr Mayo in October that was cancelled. We'd like to get her connected with endocrinology within the next few weeks. Please let me know if you need anything else from us. Thanks!  Karely Muse

## 2020-08-12 NOTE — TELEPHONE ENCOUNTER
Left a message for pt to see if she can come in tomorrow with dr paredes. 10 weeks pregnant  type 1 diabetic

## 2020-08-13 ENCOUNTER — PATIENT MESSAGE (OUTPATIENT)
Dept: ENDOCRINOLOGY | Facility: CLINIC | Age: 25
End: 2020-08-13

## 2020-08-13 ENCOUNTER — OFFICE VISIT (OUTPATIENT)
Dept: ENDOCRINOLOGY | Facility: CLINIC | Age: 25
End: 2020-08-13
Payer: COMMERCIAL

## 2020-08-13 ENCOUNTER — TELEPHONE (OUTPATIENT)
Dept: ENDOCRINOLOGY | Facility: CLINIC | Age: 25
End: 2020-08-13

## 2020-08-13 VITALS
SYSTOLIC BLOOD PRESSURE: 106 MMHG | HEIGHT: 65 IN | BODY MASS INDEX: 26.98 KG/M2 | DIASTOLIC BLOOD PRESSURE: 80 MMHG | WEIGHT: 161.94 LBS

## 2020-08-13 DIAGNOSIS — E10.22 TYPE 1 DIABETES MELLITUS WITH DIABETIC CHRONIC KIDNEY DISEASE, UNSPECIFIED CKD STAGE: ICD-10-CM

## 2020-08-13 DIAGNOSIS — E11.649 HYPOGLYCEMIA ASSOCIATED WITH DIABETES: ICD-10-CM

## 2020-08-13 PROCEDURE — 99999 PR PBB SHADOW E&M-EST. PATIENT-LVL V: ICD-10-PCS | Mod: PBBFAC,,, | Performed by: INTERNAL MEDICINE

## 2020-08-13 PROCEDURE — 99999 PR PBB SHADOW E&M-EST. PATIENT-LVL V: CPT | Mod: PBBFAC,,, | Performed by: INTERNAL MEDICINE

## 2020-08-13 PROCEDURE — 99214 PR OFFICE/OUTPT VISIT, EST, LEVL IV, 30-39 MIN: ICD-10-PCS | Mod: S$GLB,,, | Performed by: INTERNAL MEDICINE

## 2020-08-13 PROCEDURE — 99214 OFFICE O/P EST MOD 30 MIN: CPT | Mod: S$GLB,,, | Performed by: INTERNAL MEDICINE

## 2020-08-13 NOTE — PROGRESS NOTES
Subjective:      Patient ID: Isabela Read is a 25 y.o. female.    Chief Complaint:  Diabetes      History of Present Illness    T1DM, CKD and microalbuminuria who is G1 in her 11th week of gestation.     Poor control in the past   Works as  at the [employer]    11 weeks gestation     Regimen  Lantus 18 units at bedtime   humalog 12 units/12 units/12 units  Continue ICHO 1:9  Start ISF 1:25 with goal of 100    Continuous Glucose Sensor Report of Personal Device    Isabela Read is a 25 y.o.female with type 1 diabetes     Interpretation of data from FreeStyle Jayda performed on 8/13/2020  Data from dates 7/31 to 8/13 reviewed    Reason for review: discrepancy between A1C of 14% and freestyle jayda tracings, GMI of 6.9%    Lab Results   Component Value Date    HGBA1C 14.0 (H) 08/05/2020     Current diabetes medications and doses:   Lantus 18 units at bedtime   Humalog 12/12/12 --> takes less depending on  Her food intake and her blood sugar.   _______________________________________________________________    SUMMARY of KEY FINDINGS:      Average glucose: 152  Above 180 mg/dL: 47%  Within  mg/dL: 61%  Below 70 mg/dL: 9 %  GMI 6.9%cof V 52%    CGM data reviewed and notable for the following trends:   Dinner time hyperglycemic excursions - 241, 260  Overnight hyperglycemia -- snack at 12 AM did not take insulin       Will make the following changes based on review of data:  Adjust dinner time insulin to 14 units (higher carb meals, rice spaghetti)  Continue lunch time dose for now   Decrease breakfast dose (she is not taking a full 12 as this is her smallest meal usually grits and eggs) --> take 6 - 8 units   Use correction if > 100 (keep math simple for now), 1:25  Discussed bedtime blood sugars if <80 consider bedtime snack.     Review of Systems   Constitutional: Negative for fever.   HENT: Negative for congestion.    Eyes: Negative for visual disturbance.   Respiratory: Negative for  "shortness of breath.    Cardiovascular: Negative for chest pain.   Gastrointestinal: Negative for abdominal pain.   Genitourinary: Negative for dysuria.   Musculoskeletal: Negative for arthralgias.   Skin: Negative for rash.   Neurological: Negative for weakness.   Hematological: Does not bruise/bleed easily.   Psychiatric/Behavioral: Negative for sleep disturbance.            Objective:   Physical Exam  Vitals:    08/13/20 1535   BP: 106/80   Weight: 73.5 kg (161 lb 14.9 oz)   Height: 5' 5" (1.651 m)       BP Readings from Last 3 Encounters:   08/13/20 106/80   08/11/20 (!) 144/86   08/11/20 (!) 170/100     Wt Readings from Last 1 Encounters:   08/13/20 1535 73.5 kg (161 lb 14.9 oz)         Body mass index is 26.95 kg/m².    Lab Review:   Lab Results   Component Value Date    HGBA1C 14.0 (H) 08/05/2020     Lab Results   Component Value Date    CHOL 312 (H) 02/25/2019    HDL 63 02/25/2019    LDLCALC 171.2 (H) 02/25/2019    TRIG 389 (H) 02/25/2019    CHOLHDL 20.2 02/25/2019     Lab Results   Component Value Date     (L) 08/11/2020    K 5.8 (H) 08/11/2020     08/11/2020    CO2 20 (L) 08/11/2020     (H) 08/11/2020    BUN 35 (H) 08/11/2020    CREATININE 2.0 (H) 08/11/2020    CALCIUM 8.5 (L) 08/11/2020    PROT 6.4 08/11/2020    ALBUMIN 2.2 (L) 08/11/2020    BILITOT 0.3 08/11/2020    ALKPHOS 77 08/11/2020    AST 45 (H) 08/11/2020    ALT 35 08/11/2020    ANIONGAP 9 08/11/2020    ESTGFRAFRICA 39 (A) 08/11/2020    EGFRNONAA 34 (A) 08/11/2020    TSH 3.786 08/04/2020         Assessment and Plan     Type 1 diabetes mellitus with diabetic chronic kidney disease  Elevated A1C of 14% done one week ago (?) - a little unexpected:  Very low hemoglobin on iron supplements    Sources of falsely elevated A1C (low red cell turnover or interference orindirect and direct Billirubin. Not affected by carbamylated hemoglobin which is formed in the presence of elevated concentrations of urea. [place] [facility] [place] [facility] [place] "  enzymatic assay    Reviewed goals:   Goals in the morning <95 mg/dl  One hour after meals <130 - 140   Two hours after meals < 120    Continue prenatal vitamin    Continue use of freestyle ally to help. Reviewed its insensitivity in lower numbers. Before treats for hypoglycemia needs to confirm a reading with a blood sugar finger stick.   Needs to carry glucose tablets.       Dinner insulin 14 units before dinner   Breakfast insulin 6 - 8 units before breakfast  Lunch --> not taking full prescribed dose, depends on food intake --> 8 - 10 units.   Continue ISF of 1:25 with goal of 100    Agree with lantus 18 units, prefer levemir. Would consider switching once closer to goal. Check with MFM.     Repeat A1C and fructosamine in one month      Hypoglycemia associated with diabetes  Discussed treatment   Use glucose tablets easy to carry  Reviewed symptoms

## 2020-08-13 NOTE — TELEPHONE ENCOUNTER
Talk to pt mother left a message for pt to call me back  confirmed her daughter appointment with dr paredes 8/13/2020 at 3:30p. Haven't heard anything will message her on portal.

## 2020-08-13 NOTE — PATIENT INSTRUCTIONS
lantus 18 units at bedtime     Humalog   6 - 8 units before breakfast   8 - 10 units before lunch   14 units before dinner     If you are going to have a snack please give yourself insulin,   Use an insulin to carb ratio of 1:12 (chips is 24 gms of carbs you would take 2 units) if it is a bedtime snack.     Continue to use a correction scale of 1:25 with goal blood sugar of 100    Continue to use your freestyle ally    We will repeat your A1C in four weeks     Discussed self monitoring of blood glucose and urine ketones. For pre-existing diabetes I recommend checking glucose levels pre-meal and 1 hour after meals, ideally. Can check 2 hours after meals.   May need to check at 3 AM occasionally to assess for hypoglycemia in the context of fasting hyperglycemia.  Can check fasting urine ketones.     Treatment goals reviewed today.   Fasting and pre-meal plasma glucose, < 95  One hour post meal blood sugars goals 100 - 129 mg d/l  If checking two hour post meal blood sugars, advise < 120    Normalization of glycohemoglobin A1C of <6% if possible without severe hypoglycemia.    Reviewed hypoglycemic treatment. If symptoms develop (palpitations, hunger, tremors, increased sweats), first check and if BG < 60 mg/dl use 15 gms of carbohydrates and recheck in 15 min, repeat with 15 gms if BG less than 60 mg/dl.     Avoidance of severe hypoglycemia is important and will relax goals if patient has hypoglycemia unawareneess or recurrent or severe hypoglycemia.     Check A1C every 4 - 8 weeks  Review SMBG, blood pressure at every visit    Recommended ophthalmology appointment in the first trimester, and every trimester and up to one year post partum depending on degree of retinopathy.     Diabetes education appointment for review of management, nutrition and diabetes education.     Folic acid in PNV and additional 400 mcg folic acid    Vitamin D 600 IUs daily         I will see you next week for a virtual visit

## 2020-08-13 NOTE — ASSESSMENT & PLAN NOTE
Elevated A1C of 14% done one week ago (?) - a little unexpected:  Very low hemoglobin on iron supplements    Sources of falsely elevated A1C (low red cell turnover or interference orindirect and direct Billirubin. Not affected by carbamylated hemoglobin which is formed in the presence of elevated concentrations of urea. The assay used at [facility] [place] is Abbot  enzymatic assay    Reviewed goals:   Goals in the morning <95 mg/dl  One hour after meals <130 - 140   Two hours after meals < 120    Continue prenatal vitamin    Continue use of freestyle ally to help. Reviewed its insensitivity in lower numbers. Before treats for hypoglycemia needs to confirm a reading with a blood sugar finger stick.   Needs to carry glucose tablets.       Dinner insulin 14 units before dinner   Breakfast insulin 6 - 8 units before breakfast  Lunch --> not taking full prescribed dose, depends on food intake --> 8 - 10 units.   Continue ISF of 1:25 with goal of 100    Agree with lantus 18 units, prefer levemir. Would consider switching once closer to goal. Check with MFM.     Repeat A1C and fructosamine in one month

## 2020-08-17 ENCOUNTER — OFFICE VISIT (OUTPATIENT)
Dept: CARDIOLOGY | Facility: CLINIC | Age: 25
End: 2020-08-17
Payer: COMMERCIAL

## 2020-08-17 ENCOUNTER — PROCEDURE VISIT (OUTPATIENT)
Dept: MATERNAL FETAL MEDICINE | Facility: CLINIC | Age: 25
End: 2020-08-17
Payer: COMMERCIAL

## 2020-08-17 ENCOUNTER — OFFICE VISIT (OUTPATIENT)
Dept: NEPHROLOGY | Facility: CLINIC | Age: 25
End: 2020-08-17
Payer: COMMERCIAL

## 2020-08-17 ENCOUNTER — LAB VISIT (OUTPATIENT)
Dept: LAB | Facility: OTHER | Age: 25
End: 2020-08-17
Attending: OBSTETRICS & GYNECOLOGY
Payer: COMMERCIAL

## 2020-08-17 ENCOUNTER — INITIAL CONSULT (OUTPATIENT)
Dept: MATERNAL FETAL MEDICINE | Facility: CLINIC | Age: 25
End: 2020-08-17
Payer: COMMERCIAL

## 2020-08-17 VITALS
OXYGEN SATURATION: 97 % | HEIGHT: 64 IN | WEIGHT: 160.5 LBS | SYSTOLIC BLOOD PRESSURE: 160 MMHG | BODY MASS INDEX: 27.4 KG/M2 | HEART RATE: 95 BPM | DIASTOLIC BLOOD PRESSURE: 80 MMHG

## 2020-08-17 VITALS
BODY MASS INDEX: 26.7 KG/M2 | WEIGHT: 160.25 LBS | HEIGHT: 65 IN | DIASTOLIC BLOOD PRESSURE: 92 MMHG | SYSTOLIC BLOOD PRESSURE: 140 MMHG

## 2020-08-17 VITALS
BODY MASS INDEX: 27.48 KG/M2 | DIASTOLIC BLOOD PRESSURE: 70 MMHG | HEART RATE: 96 BPM | HEIGHT: 64 IN | SYSTOLIC BLOOD PRESSURE: 150 MMHG | WEIGHT: 160.94 LBS

## 2020-08-17 DIAGNOSIS — E10.22 TYPE 1 DIABETES MELLITUS WITH DIABETIC CHRONIC KIDNEY DISEASE, UNSPECIFIED CKD STAGE: ICD-10-CM

## 2020-08-17 DIAGNOSIS — R00.2 PALPITATIONS: Primary | ICD-10-CM

## 2020-08-17 DIAGNOSIS — E55.9 VITAMIN D DEFICIENCY: Primary | ICD-10-CM

## 2020-08-17 DIAGNOSIS — N18.30 CHRONIC KIDNEY DISEASE (CKD), STAGE III (MODERATE): ICD-10-CM

## 2020-08-17 DIAGNOSIS — O99.011 ANEMIA DURING PREGNANCY IN FIRST TRIMESTER: ICD-10-CM

## 2020-08-17 DIAGNOSIS — N18.30 CHRONIC KIDNEY DISEASE (CKD), STAGE III (MODERATE): Primary | ICD-10-CM

## 2020-08-17 DIAGNOSIS — Z36.89 ENCOUNTER FOR ULTRASOUND TO CHECK FETAL GROWTH: ICD-10-CM

## 2020-08-17 DIAGNOSIS — E10.22 TYPE 1 DIABETES MELLITUS WITH DIABETIC CHRONIC KIDNEY DISEASE, UNSPECIFIED CKD STAGE: Primary | ICD-10-CM

## 2020-08-17 DIAGNOSIS — Z36.9 ENCOUNTER FOR FETAL ULTRASOUND: ICD-10-CM

## 2020-08-17 DIAGNOSIS — O99.011 ANEMIA DURING PREGNANCY IN FIRST TRIMESTER: Primary | ICD-10-CM

## 2020-08-17 DIAGNOSIS — D50.9 IRON DEFICIENCY ANEMIA, UNSPECIFIED IRON DEFICIENCY ANEMIA TYPE: ICD-10-CM

## 2020-08-17 LAB
ALBUMIN SERPL BCP-MCNC: 2.1 G/DL (ref 3.5–5.2)
ALP SERPL-CCNC: 68 U/L (ref 55–135)
ALT SERPL W/O P-5'-P-CCNC: 17 U/L (ref 10–44)
ANION GAP SERPL CALC-SCNC: 6 MMOL/L (ref 8–16)
AST SERPL-CCNC: 12 U/L (ref 10–40)
BASOPHILS # BLD AUTO: 0.04 K/UL (ref 0–0.2)
BASOPHILS NFR BLD: 0.6 % (ref 0–1.9)
BILIRUB SERPL-MCNC: 0.2 MG/DL (ref 0.1–1)
BUN SERPL-MCNC: 38 MG/DL (ref 6–20)
CALCIUM SERPL-MCNC: 8.1 MG/DL (ref 8.7–10.5)
CHLORIDE SERPL-SCNC: 105 MMOL/L (ref 95–110)
CO2 SERPL-SCNC: 21 MMOL/L (ref 23–29)
CREAT SERPL-MCNC: 2.1 MG/DL (ref 0.5–1.4)
DIFFERENTIAL METHOD: ABNORMAL
EOSINOPHIL # BLD AUTO: 0.1 K/UL (ref 0–0.5)
EOSINOPHIL NFR BLD: 1 % (ref 0–8)
ERYTHROCYTE [DISTWIDTH] IN BLOOD BY AUTOMATED COUNT: 12.4 % (ref 11.5–14.5)
EST. GFR  (AFRICAN AMERICAN): 37 ML/MIN/1.73 M^2
EST. GFR  (NON AFRICAN AMERICAN): 32 ML/MIN/1.73 M^2
GLUCOSE SERPL-MCNC: 316 MG/DL (ref 70–110)
HCT VFR BLD AUTO: 21.1 % (ref 37–48.5)
HGB BLD-MCNC: 6.8 G/DL (ref 12–16)
IMM GRANULOCYTES # BLD AUTO: 0.04 K/UL (ref 0–0.04)
IMM GRANULOCYTES NFR BLD AUTO: 0.6 % (ref 0–0.5)
LYMPHOCYTES # BLD AUTO: 1.7 K/UL (ref 1–4.8)
LYMPHOCYTES NFR BLD: 23.7 % (ref 18–48)
MCH RBC QN AUTO: 29.4 PG (ref 27–31)
MCHC RBC AUTO-ENTMCNC: 32.2 G/DL (ref 32–36)
MCV RBC AUTO: 91 FL (ref 82–98)
MONOCYTES # BLD AUTO: 0.5 K/UL (ref 0.3–1)
MONOCYTES NFR BLD: 7.6 % (ref 4–15)
NEUTROPHILS # BLD AUTO: 4.7 K/UL (ref 1.8–7.7)
NEUTROPHILS NFR BLD: 66.5 % (ref 38–73)
NRBC BLD-RTO: 0 /100 WBC
PLATELET # BLD AUTO: 395 K/UL (ref 150–350)
PMV BLD AUTO: 10.6 FL (ref 9.2–12.9)
POTASSIUM SERPL-SCNC: 5.1 MMOL/L (ref 3.5–5.1)
PROT SERPL-MCNC: 5.8 G/DL (ref 6–8.4)
RBC # BLD AUTO: 2.31 M/UL (ref 4–5.4)
SODIUM SERPL-SCNC: 132 MMOL/L (ref 136–145)
WBC # BLD AUTO: 7.08 K/UL (ref 3.9–12.7)

## 2020-08-17 PROCEDURE — 99999 PR PBB SHADOW E&M-EST. PATIENT-LVL V: ICD-10-PCS | Mod: PBBFAC,,, | Performed by: INTERNAL MEDICINE

## 2020-08-17 PROCEDURE — 86147 CARDIOLIPIN ANTIBODY EA IG: CPT | Mod: 59

## 2020-08-17 PROCEDURE — 36415 COLL VENOUS BLD VENIPUNCTURE: CPT

## 2020-08-17 PROCEDURE — 99205 OFFICE O/P NEW HI 60 MIN: CPT | Mod: S$GLB,,, | Performed by: INTERNAL MEDICINE

## 2020-08-17 PROCEDURE — 80053 COMPREHEN METABOLIC PANEL: CPT

## 2020-08-17 PROCEDURE — 85613 RUSSELL VIPER VENOM DILUTED: CPT

## 2020-08-17 PROCEDURE — 99203 OFFICE O/P NEW LOW 30 MIN: CPT | Mod: S$GLB,,, | Performed by: INTERNAL MEDICINE

## 2020-08-17 PROCEDURE — 99213 PR OFFICE/OUTPT VISIT, EST, LEVL III, 20-29 MIN: ICD-10-PCS | Mod: 25,S$GLB,, | Performed by: OBSTETRICS & GYNECOLOGY

## 2020-08-17 PROCEDURE — 99999 PR PBB SHADOW E&M-EST. PATIENT-LVL IV: CPT | Mod: PBBFAC,,, | Performed by: INTERNAL MEDICINE

## 2020-08-17 PROCEDURE — 99999 PR PBB SHADOW E&M-EST. PATIENT-LVL IV: CPT | Mod: PBBFAC,,, | Performed by: OBSTETRICS & GYNECOLOGY

## 2020-08-17 PROCEDURE — 99203 PR OFFICE/OUTPT VISIT, NEW, LEVL III, 30-44 MIN: ICD-10-PCS | Mod: S$GLB,,, | Performed by: INTERNAL MEDICINE

## 2020-08-17 PROCEDURE — 86146 BETA-2 GLYCOPROTEIN ANTIBODY: CPT | Mod: 59

## 2020-08-17 PROCEDURE — 76815 PR  US,PREGNANT UTERUS,LIMITED, 1/> FETUSES: ICD-10-PCS | Mod: S$GLB,,, | Performed by: OBSTETRICS & GYNECOLOGY

## 2020-08-17 PROCEDURE — 99205 PR OFFICE/OUTPT VISIT, NEW, LEVL V, 60-74 MIN: ICD-10-PCS | Mod: S$GLB,,, | Performed by: INTERNAL MEDICINE

## 2020-08-17 PROCEDURE — 99999 PR PBB SHADOW E&M-EST. PATIENT-LVL V: CPT | Mod: PBBFAC,,, | Performed by: INTERNAL MEDICINE

## 2020-08-17 PROCEDURE — 85025 COMPLETE CBC W/AUTO DIFF WBC: CPT

## 2020-08-17 PROCEDURE — 99213 OFFICE O/P EST LOW 20 MIN: CPT | Mod: 25,S$GLB,, | Performed by: OBSTETRICS & GYNECOLOGY

## 2020-08-17 PROCEDURE — 76815 OB US LIMITED FETUS(S): CPT | Mod: S$GLB,,, | Performed by: OBSTETRICS & GYNECOLOGY

## 2020-08-17 PROCEDURE — 99999 PR PBB SHADOW E&M-EST. PATIENT-LVL IV: ICD-10-PCS | Mod: PBBFAC,,, | Performed by: INTERNAL MEDICINE

## 2020-08-17 PROCEDURE — 99999 PR PBB SHADOW E&M-EST. PATIENT-LVL IV: ICD-10-PCS | Mod: PBBFAC,,, | Performed by: OBSTETRICS & GYNECOLOGY

## 2020-08-17 RX ORDER — FERROUS SULFATE 325(65) MG
325 TABLET, DELAYED RELEASE (ENTERIC COATED) ORAL 2 TIMES DAILY
Qty: 180 TABLET | Refills: 2 | Status: ON HOLD | OUTPATIENT
Start: 2020-08-17 | End: 2022-05-09

## 2020-08-17 RX ORDER — ERGOCALCIFEROL 1.25 MG/1
50000 CAPSULE ORAL
Qty: 12 CAPSULE | Refills: 1 | Status: ON HOLD | OUTPATIENT
Start: 2020-08-17 | End: 2021-05-24

## 2020-08-17 NOTE — PATIENT INSTRUCTIONS
Do not eat high potassium diet  Please check your blood pressures twice a day and bring the log  Please donot take NSAID's (Motrin, Advil, Aleve, Ibuprofen BC powder, Goody Powder, Diclofenac, Naproxen etc), Ok to take baby aspirin.  The patient was educated in practicing a low salt diet.  · Avoid high salt foods (olives, pickles, smoked meats, salted potato chips, etc.).   · Do not add salt to your food at the table.   · Use only small amounts of salt when cooking.

## 2020-08-17 NOTE — PROGRESS NOTES
Cardiology Consult  8/17/2020  2:18 PM    Attending Cardiologist: Joesf Mcknight M.D.  Primary Care Provider: Cb Mcghee MD  Chief Complaint/Reason For Consultation:  Palpitations      Problem list  Patient Active Problem List   Diagnosis    Chest pain    HTN (hypertension)    Microalbuminuria    Dyspnea on exertion    Type 1 diabetes mellitus with diabetic chronic kidney disease    Ventricular bigeminy    Bilateral lower extremity edema    Vitamin D deficiency    Mixed hyperlipidemia    Chronic kidney disease (CKD), stage III (moderate)    Renal disease    Anemia in pregnancy    Restrictive lung disease    Poorly controlled diabetes mellitus    Hypoglycemia associated with diabetes    Palpitations       CC:  Palpitations    HPI:  Isabela Read is a 25 y.o.year-old female who is 25 weeks pregnant was referred for abnormal EKG and palpitation.  Echocardiogram was performed which showed normal left ventricle function and no significant valvular disease.  Patient states that she has had palpitation chest pain even before she was pregnant.  Chest pain is nonexertional.  Palpitation can occur at any time.  She does not smoke or drink.  Her father has some cardiac issue which she cannot provide details.  She is followed by pulmonologist at the [facility].  Her hypertension is being managed by Maternal-Fetal Medicine.    Medications  Current Outpatient Medications   Medication Sig Dispense Refill    blood sugar diagnostic Strp To check BG 4 - 6 times daily, to use with insurance preferred meter 150 each 11    blood-glucose meter kit To check BG 4 times daily, to use with insurance preferred meter 1 each 1    carvediloL (COREG) 12.5 MG tablet Take 1 tablet (12.5 mg total) by mouth 2 (two) times daily. 60 tablet 11    citalopram (CELEXA) 20 MG tablet Take 1 tablet (20 mg total) by mouth once daily. 90 tablet 0    enoxaparin (LOVENOX) 40 mg/0.4 mL Syrg Inject 0.4 mLs (40 mg total)  "into the skin once daily. 100 mL 3    ferrous sulfate 325 (65 FE) MG EC tablet Take 1 tablet (325 mg total) by mouth once daily. 60 tablet 3    flash glucose scanning reader (FREESTYLE MARCY 14 DAY READER) Misc 1 each by Misc.(Non-Drug; Combo Route) route once daily. 1 each 0    flash glucose sensor (FREESTYLE MARCY 14 DAY SENSOR) Kit Route 1 every fourteen (fourteen ) days. 2 kit 11    fluocinolone and shower cap (DERMA-SMOOTHE/FS SCALP OIL) 0.01 % Oil Apply oil to damp scalp nightly and cover with shower cap. 1 Bottle 3    hydroCHLOROthiazide (HYDRODIURIL) 25 MG tablet       insulin (LANTUS SOLOSTAR U-100 INSULIN) glargine 100 units/mL (3mL) SubQ pen Inject 20 Units into the skin every evening 15 mL 6    insulin lispro (HUMALOG KWIKPEN INSULIN) 100 unit/mL pen Inject 10 units into skin the skin 3 three times daily with meals 15 mL 6    iron,carbon,gluc-FA-B12-C-dss (FERRALET 90 DUAL/CITRANATAL BLOOM) 90-1-12-50 mg-mg-mcg-mg Tab Take 1 tablet by mouth once daily. 30 tablet 6    ketoconazole (NIZORAL) 2 % shampoo Wash hair with medicated shampoo at least 2x/week - let sit on scalp at least 5 minutes prior to rinsing 120 mL 5    lancets Misc To check BG 4 - 6 times daily, to use with insurance preferred meter 150 each 11    NIFEdipine (PROCARDIA-XL) 30 MG (OSM) 24 hr tablet Take 1 tablet (30 mg total) by mouth once daily. 30 tablet 11    pen needle, diabetic 32 gauge x 5/32" Ndle For three times daily injection 100 each 11    albuterol (VENTOLIN HFA) 90 mcg/actuation inhaler Inhale into the lungs.      losartan-hydrochlorothiazide 100-25 mg (HYZAAR) 100-25 mg per tablet Take 1 tablet by mouth once daily. (Patient not taking: Reported on 8/17/2020) 90 tablet 0    norethindrone (ORTHO MICRONOR) 0.35 mg tablet Take 1 tablet (0.35 mg total) by mouth once daily. (Patient not taking: Reported on 8/17/2020) 90 tablet 3     No current facility-administered medications for this visit.       Prior to Admission " medications    Medication Sig Start Date End Date Taking? Authorizing Provider   blood sugar diagnostic Strp To check BG 4 - 6 times daily, to use with insurance preferred meter 5/1/19  Yes Luciana Mayo MD   blood-glucose meter kit To check BG 4 times daily, to use with insurance preferred meter 5/1/19  Yes Luciana Mayo MD   carvediloL (COREG) 12.5 MG tablet Take 1 tablet (12.5 mg total) by mouth 2 (two) times daily. 8/11/20 8/11/21 Yes Karely Muse MD   citalopram (CELEXA) 20 MG tablet Take 1 tablet (20 mg total) by mouth once daily. 6/24/20  Yes Mallory Saeed PA-C   enoxaparin (LOVENOX) 40 mg/0.4 mL Syrg Inject 0.4 mLs (40 mg total) into the skin once daily. 8/11/20  Yes Karely Muse MD   ferrous sulfate 325 (65 FE) MG EC tablet Take 1 tablet (325 mg total) by mouth once daily. 8/7/20  Yes Florentin Cárdenas MD   flash glucose scanning reader (FREESTYLE MARCY 14 DAY READER) Misc 1 each by Misc.(Non-Drug; Combo Route) route once daily. 10/23/19  Yes Luciana Mayo MD   flash glucose sensor (FREESTYLE MARCY 14 DAY SENSOR) Kit Route 1 every fourteen (fourteen ) days. 10/24/19  Yes Luciana Mayo MD   fluocinolone and shower cap (DERMA-SMOOTHE/FS SCALP OIL) 0.01 % Oil Apply oil to damp scalp nightly and cover with shower cap. 10/2/19  Yes Destiny West PA-C   hydroCHLOROthiazide (HYDRODIURIL) 25 MG tablet  6/24/20  Yes Historical Provider, MD   insulin (LANTUS SOLOSTAR U-100 INSULIN) glargine 100 units/mL (3mL) SubQ pen Inject 20 Units into the skin every evening 1/27/20  Yes Dane Moody MD   insulin lispro (HUMALOG KWIKPEN INSULIN) 100 unit/mL pen Inject 10 units into skin the skin 3 three times daily with meals 6/2/20  Yes Luciana Mayo MD   iron,carbon,gluc-FA-B12-C-dss (FERRALET 90 DUAL/CITRANATAL BLOOM) 90-1-12-50 mg-mg-mcg-mg Tab Take 1 tablet by mouth once daily. 8/11/20  Yes Grecia Campa MD   ketoconazole (NIZORAL) 2 % shampoo Wash hair with medicated shampoo at least 2x/week  "- let sit on scalp at least 5 minutes prior to rinsing 10/2/19  Yes Destiny West PA-C   lancets Misc To check BG 4 - 6 times daily, to use with insurance preferred meter 5/1/19  Yes Luciana Mayo MD   NIFEdipine (PROCARDIA-XL) 30 MG (OSM) 24 hr tablet Take 1 tablet (30 mg total) by mouth once daily. 8/11/20 8/11/21 Yes Karely Muse MD   pen needle, diabetic 32 gauge x 5/32" Ndle For three times daily injection 5/1/19  Yes Luciana Mayo MD   albuterol (VENTOLIN HFA) 90 mcg/actuation inhaler Inhale into the lungs.    Historical Provider, MD   losartan-hydrochlorothiazide 100-25 mg (HYZAAR) 100-25 mg per tablet Take 1 tablet by mouth once daily.  Patient not taking: Reported on 8/17/2020 6/24/20 6/24/21  Mallory Saeed PA-C   norethindrone (ORTHO MICRONOR) 0.35 mg tablet Take 1 tablet (0.35 mg total) by mouth once daily.  Patient not taking: Reported on 8/17/2020 11/25/19 11/24/20  Marion Jeffrey MD   ferrous gluconate (FERGON) 324 MG tablet Take 324 mg by mouth daily with breakfast.  8/17/20  Historical Provider, MD   losartan (COZAAR) 100 MG tablet  6/24/20 8/17/20  Historical Provider, MD         History  Past Medical History:   Diagnosis Date    Anemia     Chronic kidney disease     Diabetes mellitus     Hypertension     Nephrotic syndrome     Restrictive lung disease      No past surgical history on file.  Social History     Socioeconomic History    Marital status: Single     Spouse name: Not on file    Number of children: Not on file    Years of education: Not on file    Highest education level: Not on file   Occupational History    Occupation:  at [employer]   Social Needs    Financial resource strain: Not on file    Food insecurity     Worry: Not on file     Inability: Not on file    Transportation needs     Medical: Not on file     Non-medical: Not on file   Tobacco Use    Smoking status: Never Smoker    Smokeless tobacco: Never Used   Substance and Sexual Activity    " Alcohol use: No    Drug use: No    Sexual activity: Yes     Partners: Male     Comment: monogamous   Lifestyle    Physical activity     Days per week: Not on file     Minutes per session: Not on file    Stress: Not on file   Relationships    Social connections     Talks on phone: Not on file     Gets together: Not on file     Attends Jainism service: Not on file     Active member of club or organization: Not on file     Attends meetings of clubs or organizations: Not on file     Relationship status: Not on file   Other Topics Concern    Not on file   Social History Narrative    Not on file         Allergies  Review of patient's allergies indicates:  No Known Allergies      Review of Systems   Review of Systems   Cardiovascular: Positive for palpitations. Negative for chest pain, claudication, cyanosis, dyspnea on exertion, irregular heartbeat, leg swelling, near-syncope, orthopnea, paroxysmal nocturnal dyspnea and syncope.   Respiratory: Positive for shortness of breath. Negative for cough, hemoptysis, sleep disturbances due to breathing, snoring, sputum production and wheezing.          Physical Exam  Wt Readings from Last 1 Encounters:   08/17/20 73 kg (160 lb 15 oz)     BP Readings from Last 3 Encounters:   08/17/20 (!) 150/70   08/17/20 (!) 140/92   08/13/20 106/80     Pulse Readings from Last 1 Encounters:   08/17/20 96     Body mass index is 27.62 kg/m².    Physical Exam   Constitutional: She is oriented to person, place, and time. She appears well-developed and well-nourished.   Neck: No JVD present. Carotid bruit is not present.   Cardiovascular: Normal rate, regular rhythm, S1 normal and S2 normal.   Pulses:       Carotid pulses are 2+ on the right side and 2+ on the left side.       Radial pulses are 2+ on the right side and 2+ on the left side.        Dorsalis pedis pulses are 2+ on the right side and 2+ on the left side.   Pulmonary/Chest: Breath sounds normal.   Abdominal: Bowel sounds are  normal.   pregnant   Musculoskeletal:         General: No edema.   Neurological: She is alert and oriented to person, place, and time.                 Assessment:  1.  Palpitations  Being evaluated    Plan:  Get 24 hour Holter to evaluate her palpitations.    Follow up:  1-2 months    Time spent evaluating and treating patient 20 minutes with >50% of this time being face-to-face.     Josef Mcknight MD, F.A.C.C, F.S.C.A.I.

## 2020-08-17 NOTE — PROGRESS NOTES
REASON FOR CONSULT/CHIEF COMPLAIN: Chronic Kidney disease in pregnancy    REFERRING PHYSICIAN: Cb Mcghee MD    HISTORY OF PRESENT ILLNESS: 25 y.o. female who is new to me  has a past medical history of Anemia, Chronic kidney disease, Diabetes mellitus, Hypertension, Nephrotic syndrome, and Restrictive lung disease. patient was referred here for abnormal renal function.   She has had DM 1 since age 8, HTN since 10 year ago, started taking medications for HTN since 2018. No chronic NSAID use, no known exposure to lithium, lead.   Denied any issues with urination including dysuria, hematuria, urgency, hesitancy, nocturia, incomplete voiding. Denied chest pain, nausea, vomiting, abd pain, nausea, vomiting, diarrhea, shortness of breath, pedal edema, Orthopnea, PND.  Home Blood pressures are checked intermittently      ROS:  General: negative for chills, or fatigue  ENT: No epistaxis or headaches  Hematological and Lymphatic: No bleeding problems or blood clots.  Endocrine: No skin changes or temperature intolerance  Respiratory: No cough, shortness of breath, or wheezing  Cardiovascular: No chest pain or dyspnea   Gastrointestinal: No abdominal pain, change in bowel habits  Genito-Urinary: No dysuria, trouble voiding, or hematuria  Musculoskeletal: ROS: negative for - joint pain, joint stiffness, joint swelling, muscle pain or muscular weakness  Neurological: No new focal weakness, no numbness  Dermatological: No rash or ulcers.    PAST MEDICAL HISTORY:  Past Medical History:   Diagnosis Date    Anemia     Chronic kidney disease     Diabetes mellitus     Hypertension     Nephrotic syndrome     Restrictive lung disease        PAST SURGICAL HISTORY:  No past surgical history on file.    FAMILY HISTORY:   Family History   Problem Relation Age of Onset    Heart disease Father     Diabetes Brother        SOCIAL HISTORY:  Social History     Socioeconomic History    Marital status: Single     Spouse name: Not  on file    Number of children: Not on file    Years of education: Not on file    Highest education level: Not on file   Occupational History    Occupation:  at [employer]   Social Needs    Financial resource strain: Not on file    Food insecurity     Worry: Not on file     Inability: Not on file    Transportation needs     Medical: Not on file     Non-medical: Not on file   Tobacco Use    Smoking status: Never Smoker    Smokeless tobacco: Never Used   Substance and Sexual Activity    Alcohol use: No    Drug use: No    Sexual activity: Yes     Partners: Male     Comment: monogamous   Lifestyle    Physical activity     Days per week: Not on file     Minutes per session: Not on file    Stress: Not on file   Relationships    Social connections     Talks on phone: Not on file     Gets together: Not on file     Attends [employer] service: Not on file     Active member of club or organization: Not on file     Attends meetings of clubs or organizations: Not on file     Relationship status: Not on file   Other Topics Concern    Not on file   Social History Narrative    Not on file       ALLERGIES:  Review of patient's allergies indicates:  No Known Allergies    MEDICATIONS:    Current Outpatient Medications:     albuterol (VENTOLIN HFA) 90 mcg/actuation inhaler, Inhale into the lungs., Disp: , Rfl:     blood sugar diagnostic Strp, To check BG 4 - 6 times daily, to use with insurance preferred meter, Disp: 150 each, Rfl: 11    blood-glucose meter kit, To check BG 4 times daily, to use with insurance preferred meter, Disp: 1 each, Rfl: 1    carvediloL (COREG) 12.5 MG tablet, Take 1 tablet (12.5 mg total) by mouth 2 (two) times daily., Disp: 60 tablet, Rfl: 11    citalopram (CELEXA) 20 MG tablet, Take 1 tablet (20 mg total) by mouth once daily., Disp: 90 tablet, Rfl: 0    enoxaparin (LOVENOX) 40 mg/0.4 mL Syrg, Inject 0.4 mLs (40 mg total) into the skin once daily., Disp: 100 mL, Rfl: 3    ferrous  "sulfate 325 (65 FE) MG EC tablet, Take 1 tablet (325 mg total) by mouth once daily., Disp: 60 tablet, Rfl: 3    flash glucose scanning reader (FREESTYLE MARCY 14 DAY READER) Misc, 1 each by Misc.(Non-Drug; Combo Route) route once daily., Disp: 1 each, Rfl: 0    flash glucose sensor (FREESTYLE MARCY 14 DAY SENSOR) Kit, Route 1 every fourteen (fourteen ) days., Disp: 2 kit, Rfl: 11    fluocinolone and shower cap (DERMA-SMOOTHE/FS SCALP OIL) 0.01 % Oil, Apply oil to damp scalp nightly and cover with shower cap., Disp: 1 Bottle, Rfl: 3    hydroCHLOROthiazide (HYDRODIURIL) 25 MG tablet, , Disp: , Rfl:     insulin (LANTUS SOLOSTAR U-100 INSULIN) glargine 100 units/mL (3mL) SubQ pen, Inject 20 Units into the skin every evening, Disp: 15 mL, Rfl: 6    insulin lispro (HUMALOG KWIKPEN INSULIN) 100 unit/mL pen, Inject 10 units into skin the skin 3 three times daily with meals, Disp: 15 mL, Rfl: 6    iron,carbon,gluc-FA-B12-C-dss (FERRALET 90 DUAL/CITRANATAL BLOOM) 90-1-12-50 mg-mg-mcg-mg Tab, Take 1 tablet by mouth once daily., Disp: 30 tablet, Rfl: 6    ketoconazole (NIZORAL) 2 % shampoo, Wash hair with medicated shampoo at least 2x/week - let sit on scalp at least 5 minutes prior to rinsing, Disp: 120 mL, Rfl: 5    lancets Misc, To check BG 4 - 6 times daily, to use with insurance preferred meter, Disp: 150 each, Rfl: 11    losartan-hydrochlorothiazide 100-25 mg (HYZAAR) 100-25 mg per tablet, Take 1 tablet by mouth once daily., Disp: 90 tablet, Rfl: 0    NIFEdipine (PROCARDIA-XL) 30 MG (OSM) 24 hr tablet, Take 1 tablet (30 mg total) by mouth once daily., Disp: 30 tablet, Rfl: 11    norethindrone (ORTHO MICRONOR) 0.35 mg tablet, Take 1 tablet (0.35 mg total) by mouth once daily., Disp: 90 tablet, Rfl: 3    pen needle, diabetic 32 gauge x 5/32" Ndle, For three times daily injection, Disp: 100 each, Rfl: 11   Medication List with Changes/Refills   Current Medications    ALBUTEROL (VENTOLIN HFA) 90 MCG/ACTUATION " INHALER    Inhale into the lungs.    BLOOD SUGAR DIAGNOSTIC STRP    To check BG 4 - 6 times daily, to use with insurance preferred meter    BLOOD-GLUCOSE METER KIT    To check BG 4 times daily, to use with insurance preferred meter    CARVEDILOL (COREG) 12.5 MG TABLET    Take 1 tablet (12.5 mg total) by mouth 2 (two) times daily.    CITALOPRAM (CELEXA) 20 MG TABLET    Take 1 tablet (20 mg total) by mouth once daily.    ENOXAPARIN (LOVENOX) 40 MG/0.4 ML SYRG    Inject 0.4 mLs (40 mg total) into the skin once daily.    FERROUS SULFATE 325 (65 FE) MG EC TABLET    Take 1 tablet (325 mg total) by mouth once daily.    FLASH GLUCOSE SCANNING READER (FREESTYLE MARCY 14 DAY READER) MISC    1 each by Misc.(Non-Drug; Combo Route) route once daily.    FLASH GLUCOSE SENSOR (FREESTYLE MARCY 14 DAY SENSOR) KIT    Route 1 every fourteen (fourteen ) days.    FLUOCINOLONE AND SHOWER CAP (DERMA-SMOOTHE/FS SCALP OIL) 0.01 % OIL    Apply oil to damp scalp nightly and cover with shower cap.    HYDROCHLOROTHIAZIDE (HYDRODIURIL) 25 MG TABLET        INSULIN (LANTUS SOLOSTAR U-100 INSULIN) GLARGINE 100 UNITS/ML (3ML) SUBQ PEN    Inject 20 Units into the skin every evening    INSULIN LISPRO (HUMALOG KWIKPEN INSULIN) 100 UNIT/ML PEN    Inject 10 units into skin the skin 3 three times daily with meals    IRON,CARBON,GLUC-FA-B12-C-DSS (FERRALET 90 DUAL/CITRANATAL BLOOM) 90-1-12-50 MG-MG-MCG-MG TAB    Take 1 tablet by mouth once daily.    KETOCONAZOLE (NIZORAL) 2 % SHAMPOO    Wash hair with medicated shampoo at least 2x/week - let sit on scalp at least 5 minutes prior to rinsing    LANCETS MISC    To check BG 4 - 6 times daily, to use with insurance preferred meter    LOSARTAN-HYDROCHLOROTHIAZIDE 100-25 MG (HYZAAR) 100-25 MG PER TABLET    Take 1 tablet by mouth once daily.    NIFEDIPINE (PROCARDIA-XL) 30 MG (OSM) 24 HR TABLET    Take 1 tablet (30 mg total) by mouth once daily.    NORETHINDRONE (ORTHO MICRONOR) 0.35 MG TABLET    Take 1 tablet  "(0.35 mg total) by mouth once daily.    PEN NEEDLE, DIABETIC 32 GAUGE X 5/32" NDLE    For three times daily injection        PHYSICAL EXAM:  BP (!) 160/80   Pulse 95   Ht 5' 4" (1.626 m)   Wt 72.8 kg (160 lb 7.9 oz)   LMP  (LMP Unknown) Comment: pt. states last cycle was in May. She is not sure of the exact date.  SpO2 97%   BMI 27.55 kg/m²     General: No distress, No fever or chills  Head: Normocephalic,atraumatic  Eyes: conjunctivae/corneas clear. PERRL, EOM's intact.  Nose: Nares normal. Mucosa normal. No drainage or sinus tenderness.  Neck: No adenopathy,no carotid bruit,no JVD  Lungs:Clear to auscultation bilaterally, No Crackles  Heart: Regular rate and rhythm, no murmur, gallops or rubs  Abdomen: Soft, no tenderness, bowel sounds normal  Extremities: Atraumatic, no edema in LE  Skin: Turgor normal. No rashes or ulcers  Neurologic: No focal weakness, oriented.  Dialysis Access: Non applicable         LABS:  Lab Results   Component Value Date    HGB 6.8 (L) 08/17/2020        Lab Results   Component Value Date    CREATININE 2.1 (H) 08/17/2020       Prot/Creat Ratio, Ur   Date Value Ref Range Status   07/29/2020 7.88 (H) 0.00 - 0.20 Final       Lab Results   Component Value Date     (L) 08/17/2020    K 5.1 08/17/2020    CO2 21 (L) 08/17/2020       last PTH   Lab Results   Component Value Date    .1 (H) 08/05/2020    CALCIUM 8.1 (L) 08/17/2020    PHOS 5.2 (H) 08/06/2020       Lab Results   Component Value Date    HGBA1C 14.0 (H) 08/05/2020       Lab Results   Component Value Date    LDLCALC 171.2 (H) 02/25/2019         Creatinine, Random Ur   Date Value Ref Range Status   07/29/2020 58.0 15.0 - 325.0 mg/dL Final     Comment:     The random urine reference ranges provided were established   for 24 hour urine collections.  No reference ranges exist for  random urine specimens.  Correlate clinically.     02/25/2019 122.0 15.0 - 325.0 mg/dL Final     Comment:     The random urine reference ranges " provided were established   for 24 hour urine collections.  No reference ranges exist for  random urine specimens.  Correlate clinically.         Protein, Urine   Date Value Ref Range Status   08/05/2020 503 (H) 0 - 15 mg/dL Final     Protein, Urine Random   Date Value Ref Range Status   07/29/2020 457 (H) 0 - 15 mg/dL Final     Comment:     The random urine reference ranges provided were established   for 24 hour urine collections.  No reference ranges exist for  random urine specimens.  Correlate clinically.         Prot/Creat Ratio, Ur   Date Value Ref Range Status   07/29/2020 7.88 (H) 0.00 - 0.20 Final         ASSESSMENT:  1) Chronic Kidney Disease stage 3  2) Nephrotic Range Proteinuria  3) Hypoalbuminemia  4) Hyperkalemia  5) 12 weeks pregnant   Patients creatinine has been 1.8 mg/dl in 2019 prior to her pregnancy. Since her pregnancy started it has been around 2.1 mg/dl. Her BUN to between 32-38. I worry that she most likely will need dialysis to keep her BUN below 50 during this pregnancy.    She also has significant proteinuria. Her Nephrotic range proteinuria might improve a little once her blood pressure is under better control but I worry that she will be at high risk of thrombosis. She has been started on Lovenox 40 mg, at this point dont have any better option, continue the same.   Educated about low potassium diet. Acid-base in acceptable range. Starting Vitamin D.  - Renal Ultrasound.    6) Uncontrolled HTN   Patient reports that the blood pressure is little high during the visit. Her pressures during this am is 140/70 mmhg. Requested patient to check pressures twice a day and bring log. She is now on Coreg 12.5 mg BID, HCTZ 25 mg daily and Procardia 30 mg daily. Plan is to increase the procardia to twice a day if pressures continue to be high.    7) Anemia  Will probably need iron infusion, will discuss with MFM. For now Iron twice a day.    8) Vitamin D deficiency  Starting ergocalciferol    9)  DM  Being followed by endocrine.    Very high risk for mother and baby. Discussed at length with patient. Requested if her boy friend or mother could come to the next clinic visit.      RTC in 10 days.    Diagnosis and plan of care explained to the patient.  Pt Verbalized understanding. Answered all questions.  Thanks for allowing me to participate in the care of this patient.     3:59 PM    Oswald Kruger MD  Nephrology & Critical Care

## 2020-08-17 NOTE — PROGRESS NOTES
Maternal Fetal Medicine follow up consult    SUBJECTIVE:     Isabela Read is a 25 y.o.  female with IUP at 11w4d who is seen in follow up consultation by MFM.  Pregnancy complications include: type 1 DM, HTN, CKD stage III.    Previous notes reviewed.   No changes to medical, surgical, family, social, or obstetric history.    Interval history since last MFM visit: patient seen in [facility] last week for BP control. Now taking medications listed below. Started taking lovenox. Saw endocrine last week, who adjusted her insulin regimen.    Medications:  Celexa  PNV  HCTZ  Coreg 12.5mg BID  Procardia 30 daily  Lovenox 40 daily  Lantus 18 qhs  Humalog 8/14/10    Care team members:  Rachele - Primary OB  Jacintop - endocrine     OBJECTIVE:     Blood Pressure: 140/92  Weight: 72kg    Ultrasound performed. See viewpoint for full ultrasound report.  +FHTs      BG log reviewed:  Fasting 89, 124, 155, 174, 120, 46  2hr PP breakfast 62, 80, 92, 58, 112, 85  2hr PP lunch 184, 145, 84, 121, 95  2hr PP dinner 106, 125, 111, 133, 73  Patient reports that last night her BG was 73 after dinner so she skipped her bedtime insulin. She woke up around 1am and noted her BG was high, so she took her Lantus 18u at that time, which is what she attributes her fasting glucose this am being so low.     ASSESSMENT/PLAN:     25 y.o.  female with IUP at 11w4d     Type 1 DM  Defer to endocrine for insulin regimen management. Patient has an appointment with Dr Mayo later this week.   I discussed with patient about the importance of taking bedtime insulin as prescribed. If BG is low at bedtime, OK to eat a snack before bed and follow insulin regimen as directed by endocrine.   Will plan to see patient back at 16 weeks for limited anatomy scan, then again at 19-20 weeks for targeted anatomy scan. Patient will also need fetal echo at 22-24 weeks.   Repeat HbA1c q4 weeks    HTN  BPs better controlled on current regimen  Patient seeing cardiology  today    Stage III CKD  Patient seeing nephrology today  K on last CMP was 5.8 but specimen was hemolyzed - will re-evaluate CMP and CBC today.   APLS labs also to be drawn today  Renal U/S ordered by primary OB  Patient tolerating Lovenox without problems    IUP at 11 weeks  Patient desires NIPT - instructed her to discuss with primary OB      Time spent in consultation today: 15 minutes, >50% of which was face-to-face time with the patient.    Karely Muse MD  Maternal Fetal Medicine fellow  PGY-6

## 2020-08-17 NOTE — LETTER
August 17, 2020      Cb Mcghee MD  [address]  [facility] [address]           [facility] - Nephrology 5th Fl  [address]  [place] [address]  Phone: [phone]  Fax: [phone]          Patient: Isabela Read   MR Number: [id number]   YOB: 1995   Date of Visit: 8/17/2020       Dear Dr. Cb Mcghee:    Thank you for referring Isabela Read to me for evaluation. Attached you will find relevant portions of my assessment and plan of care.    If you have questions, please do not hesitate to call me. I look forward to following Isabela Read along with you.    Sincerely,    Oswald Kruger MD    Enclosure  CC:  No Recipients    If you would like to receive this communication electronically, please contact [email or web address] or [phone] to request more information on [employer] Link access.    For providers and/or their staff who would like to refer a patient to [facility], please contact us through our one-stop-shop provider referral line, [facility], at [phone].    If you feel you have received this communication in error or would no longer like to receive these types of communications, please e-mail [email or web address]

## 2020-08-18 ENCOUNTER — CLINICAL SUPPORT (OUTPATIENT)
Dept: DIABETES | Facility: CLINIC | Age: 25
End: 2020-08-18
Payer: COMMERCIAL

## 2020-08-18 ENCOUNTER — HOSPITAL ENCOUNTER (OUTPATIENT)
Dept: RADIOLOGY | Facility: HOSPITAL | Age: 25
Discharge: HOME | End: 2020-08-18
Attending: INTERNAL MEDICINE
Payer: COMMERCIAL

## 2020-08-18 VITALS — WEIGHT: 161.63 LBS | BODY MASS INDEX: 27.74 KG/M2

## 2020-08-18 DIAGNOSIS — O24.011 PRE-EXISTING TYPE 1 DIABETES MELLITUS DURING PREGNANCY IN FIRST TRIMESTER: ICD-10-CM

## 2020-08-18 DIAGNOSIS — N18.30 CHRONIC KIDNEY DISEASE (CKD), STAGE III (MODERATE): ICD-10-CM

## 2020-08-18 LAB — LA PPP-IMP: NEGATIVE

## 2020-08-18 PROCEDURE — 76770 US RETROPERITONEAL COMPLETE: ICD-10-PCS | Mod: 26,,, | Performed by: RADIOLOGY

## 2020-08-18 PROCEDURE — G0108 PR DIAB MANAGE TRN  PER INDIV: ICD-10-PCS | Mod: ,,, | Performed by: DIETITIAN, REGISTERED

## 2020-08-18 PROCEDURE — 76770 US EXAM ABDO BACK WALL COMP: CPT | Mod: TC

## 2020-08-18 PROCEDURE — G0108 DIAB MANAGE TRN  PER INDIV: HCPCS | Mod: ,,, | Performed by: DIETITIAN, REGISTERED

## 2020-08-18 PROCEDURE — 76770 US EXAM ABDO BACK WALL COMP: CPT | Mod: 26,,, | Performed by: RADIOLOGY

## 2020-08-18 PROCEDURE — 99211 OFF/OP EST MAY X REQ PHY/QHP: CPT | Mod: 25 | Performed by: DIETITIAN, REGISTERED

## 2020-08-18 NOTE — PROGRESS NOTES
"Diabetes Education  Author: Kristine Lloyd RD  Date: 2020    Diabetes Care Management Summary  Diabetes Education Record Assessment/Progress: Initial  Current Diabetes Risk Level: High     Last A1c:   Lab Results   Component Value Date    HGBA1C 14.0 (H) 2020     Last Visit with Diabetes Educator: Last Education Visit: Not Found  Last OPCM Referral: : Not Found   Enrolled in OPCM: No      Diabetes Type  Diabetes Type : Type I(type 1 during pregnancy)    Diabetes History  Diabetes Diagnosis: >10 years(17 years)  Current Treatment: Insulin  Reviewed Problem List with Patient: Yes    Health Maintenance was reviewed today with patient. Discussed with patient importance of routine eye exams, foot exams/foot care, blood work (i.e.: A1c, microalbumin, and lipid), dental visits, yearly flu vaccine, and pneumonia vaccine as indicated by PCP. Patient verbalized understanding.     Health Maintenance Topics with due status: Not Due       Topic Last Completion Date    TETANUS VACCINE 2013    Pap Smear 2019    Influenza Vaccine 10/21/2019    Foot Exam 10/21/2019    Hemoglobin A1c 2020     Health Maintenance Due   Topic Date Due    Eye Exam  2019    Lipid Panel  2020       Nutrition  Meal Planning: skipping meals, water, diet drinks, eats out often  What type of sweetener do you use?: Equal  What type of beverages do you drink?: diet soda/tea, water  Meal Plan 24 Hour Recall - Breakfast: none  Meal Plan 24 Hour Recall - Lunch: Tuna sandwich with zero darryn vitamin water  Meal Plan 24 Hour Recall - Dinner: 2 castle burgers with water and diet ginger ale  Meal Plan 24 Hour Recall - Snack: fresh fruit    Monitoring   Self Monitoring : fastin this am, 46 yesterday, 112, 174, registered "low"(juice) then 151,  PP:107, 74,210, 95  Blood Glucose Logs: Yes  Do you use a personal continuous glucose monitor?: Yes  What kind of glucose monitor do you use?: Freestyle Jayda Flash  In the last " month, how often have you had a low blood sugar reaction?: more than once a week  What are your symptoms of low blood sugar?: jittery  How do you treat low blood sugar?: juice  Can you tell when your blood sugar is too high?: yes  How do you treat high blood sugar?: insulin    Exercise   Exercise Type: none  Frequency: Never    Current Diabetes Treatment   Current Treatment: Insulin    Social History  Preferred Learning Method: Face to Face  Primary Support: Self, Family(boyfriend and mother)  Educational Level: High School  Occupation: medical assistant at [employer]  Smoking Status: Never a Smoker  Alcohol Use: Never            DDS-2 Score  ( > 3 = SIGNIFICANT DISTRESS): 2                   Barriers to Change  Barriers to Change: None  Learning Challenges : None    Readiness to Learn   Readiness to Learn : Acceptance    Cultural Influences  Cultural Influences: No    Diabetes Education Assessment/Progress  Diabetes Disease Process (diabetes disease process and treatment options): Discussion, Needs Instruction, Individual Session, Instructed, Written Materials Provided(Ed on insulin needs during pregnancy)  Nutrition (Incorporating nutritional management into one's lifestyle): Demonstration, Discussion, Return Demonstration, Instructed, Needs Instruction, Individual Session, Written Materials Provided(Has had dm for 17 yrs. States knows how to carb count. Reviewed carb counting, lable reading and meal and snack planning.)  Physical Activity (incorporating physical activity into one's lifestyle): Discussion, Needs Instruction, Individual Session, Instructed, Written Materials Provided(Ed on benefits and precautions to take when exercising during pregnancy)  Medications (states correct name, dose, onset, peak, duration, side effects & timing of meds): Discussion, Needs Instruction, Individual Session, Instructed, Written Materials Provided(Is currently taking 8/10/12 units of Humalog before meals and 18 units of  "Lantus at different times during the evening. Ed on insulin mech and timing. Agrees to take Lantus at the same time every night.  Insulin adjustment this week with Endo.)  Monitoring (monitoring blood glucose/other parameters & using results): Discussion, Needs Review, Individual Session, Instructed, Written Materials Provided(Wearing Freestyle Jayda, BG currently 158 PP lunch. Reviewed logs, frequent hypoglycemia this week. She will likely need less insulin)  Acute Complications (preventing, detecting, and treating acute complications): Discussion, Demonstration, Needs Instruction, Instructed, Written Materials Provided(Ed on causes, s/s and Tx for hypo and hyperglycemia. Stressed proper timing of insulin and hypoglycemia prevention. Encouraged her to treat with 8oz of milk instead of juice to prevent rebound high. Stressed proper timing of insulin)  Chronic Complications (preventing, detecting, and treating chronic complications): Discussion, Needs Instruction, Individual Session, Instructed, Written Materials Provided(Stressed tight BG control to limit/prevent complication during and after pregnancy)  Clinical (diabetes, other pertinent medical history, and relevant comorbidities reviewed during visit): Discussion, Needs Instruction, Individual Session, Instructed, Written Materials Provided(Reviewed relevant labs with target goals)  Cognitive (knowledge of self-management skills, functional health literacy): Discussion, Needs Instruction, Individual Session, Instructed, Written Materials Provided  Psychosocial (emotional response to diabetes): Discussion, Needs Review, Individual Session, Instructed, Written Materials Provided(She is accepting of diabetes Dx)  Diabetes Distress and Support Systems: Discussion, Needs Review, Individual Session, Instructed(States that she has "a lot of support" and that she is happy about being pregnant)  Behavioral (readiness for change, lifestyle practices, self-care behaviors): " Discussion, Needs Instruction, Individual Session, Instructed, Written Materials Provided    Goals  Patient has selected/evaluated goals during today's session: Yes, selected  Healthy Eating: Set(eat 30-45 g of carbs at breakfast and 45-60g of carbs at lunch and dinner)  Start Date: 08/18/20  Target Date: 08/25/20  Medications: Set(Take Lantus at the same time every evening)  Start Date: 08/18/20  Target Date: 08/25/20         Diabetes Care Plan/Intervention  Education Plan/Intervention: Endocrine Provider Visit Set Up    Diabetes Meal Plan  Restrictions: Restricted Carbohydrate  Calories: 2000  Carbohydrate Per Meal: 45-60g, 30-45g  Carbohydrate Per Snack : 7-15g  Fat: 80-90g  Protein: 80-90g    Today's Self-Management Care Plan was developed with the patient's input and is based on barriers identified during today's assessment.    The long and short-term goals in the care plan were written with the patient/caregiver's input. The patient has agreed to work toward these goals to improve her overall diabetes control.      The patient received a copy of today's self-management plan and verbalized understanding of the care plan, goals, and all of today's instructions.      The patient was encouraged to communicate with her physician and care team regarding her condition(s) and treatment.  I provided the patient with my contact information today and encouraged her to contact me via phone or patient portal as needed.     Education Units of Time   Time Spent: 60 min

## 2020-08-19 ENCOUNTER — TELEPHONE (OUTPATIENT)
Dept: NEPHROLOGY | Facility: CLINIC | Age: 25
End: 2020-08-19

## 2020-08-19 ENCOUNTER — TELEPHONE (OUTPATIENT)
Dept: HEMATOLOGY/ONCOLOGY | Facility: CLINIC | Age: 25
End: 2020-08-19

## 2020-08-19 NOTE — TELEPHONE ENCOUNTER
Called patient multiple times to discuss about possible Iron infusions, she did not answer. Called her home phone and her mother answered, I informed her that I am trying to reach out to her to discuss some treatment. Requested her mom to tell patient to answer her phone.

## 2020-08-19 NOTE — PROGRESS NOTES
Subjective:      Patient ID: Isabela Read is a 25 y.o. female.    Chief Complaint:  No chief complaint on file.      History of Present Illness  Ms. Read is a 25 year old woman for a virtual visit for T1DM, complicated by CKD, hypoglycemia. She is in her 12th week of gestation.     Challenges:  Not using insulin with snacks  Overcorrecting hyperglycemia  Reports using less insulin (than prescribed) with meals, feels comfortable using ICHO ratio  Occasional hypoglycemia (has symptoms) mid day - overcorrects hypoglycemia  Not keeping dietary log and not keeping track of insulin dosing.     Met with DM educator    Current regimen:    Lantus 18 units   Humalog 6 - 8 units breakfast  humalog 8 - 10 units lunch  humalog 14 units dinner    Review of Systems   Constitutional: Negative for fever.   HENT: Negative for congestion.    Eyes: Negative for visual disturbance.   Respiratory: Negative for shortness of breath.    Cardiovascular: Negative for chest pain.   Gastrointestinal: Negative for abdominal pain.   Genitourinary: Negative for dysuria.   Musculoskeletal: Negative for arthralgias.   Skin: Negative for rash.   Neurological: Negative for weakness.   Hematological: Does not bruise/bleed easily.   Psychiatric/Behavioral: Negative for sleep disturbance.       Objective:   Physical Exam  There were no vitals filed for this visit.    BP Readings from Last 3 Encounters:   08/17/20 (!) 160/80   08/17/20 (!) 150/70   08/17/20 (!) 140/92     Wt Readings from Last 1 Encounters:   08/18/20 1640 73.3 kg (161 lb 9.6 oz)         There is no height or weight on file to calculate BMI.    Lab Review:   Lab Results   Component Value Date    HGBA1C 14.0 (H) 08/05/2020     Lab Results   Component Value Date    CHOL 312 (H) 02/25/2019    HDL 63 02/25/2019    LDLCALC 171.2 (H) 02/25/2019    TRIG 389 (H) 02/25/2019    CHOLHDL 20.2 02/25/2019     Lab Results   Component Value Date     (L) 08/17/2020    K 5.1 08/17/2020    CL  105 08/17/2020    CO2 21 (L) 08/17/2020     (H) 08/17/2020    BUN 38 (H) 08/17/2020    CREATININE 2.1 (H) 08/17/2020    CALCIUM 8.1 (L) 08/17/2020    PROT 5.8 (L) 08/17/2020    ALBUMIN 2.1 (L) 08/17/2020    BILITOT 0.2 08/17/2020    ALKPHOS 68 08/17/2020    AST 12 08/17/2020    ALT 17 08/17/2020    ANIONGAP 6 (L) 08/17/2020    ESTGFRAFRICA 37 (A) 08/17/2020    EGFRNONAA 32 (A) 08/17/2020    TSH 3.786 08/04/2020         Assessment and Plan     Type 1 diabetes mellitus with diabetic chronic kidney disease  Patient feels comfortable carb counting, this is the best way to proceed as she changes her prescribed dose anyway.     Have asked her to try and use 1:10 insulin to carb ratio based on TDD of 40 units (average for the last few days).    Lantus 17 units at bedtime -- hypoglycemia  Humalog (currently on 1:25)  ISF 1:35  Humalog:  ICHO 1:10 carb counting for all meals   She is not using prescribed dosing at all    overtreats for hyperglycemia which leads to hypoglycemia (may be compounded by lantus as well)  Seen by DM education    We discussed insulin pump, wants to think about it.         Hypoglycemia associated with diabetes  Lower insulin dose and adjust correction to 1:35  Risk factor includes CKD       The patient location is: [place]  The chief complaint leading to consultation is: type 1 DM    Visit type: audiovisual    Face to Face time with patient: 20, 25 minutes of total time spent on the encounter, which includes face to face time and non-face to face time preparing to see the patient (eg, review of tests), Obtaining and/or reviewing separately obtained history, Documenting clinical information in the electronic or other health record, Independently interpreting results (not separately reported) and communicating results to the patient/family/caregiver, or Care coordination (not separately reported).         Each patient to whom he or she provides medical services by telemedicine is:  (1) informed of  the relationship between the physician and patient and the respective role of any other health care provider with respect to management of the patient; and (2) notified that he or she may decline to receive medical services by telemedicine and may withdraw from such care at any time.

## 2020-08-20 ENCOUNTER — OFFICE VISIT (OUTPATIENT)
Dept: ENDOCRINOLOGY | Facility: CLINIC | Age: 25
End: 2020-08-20
Payer: COMMERCIAL

## 2020-08-20 ENCOUNTER — TELEPHONE (OUTPATIENT)
Dept: HEMATOLOGY/ONCOLOGY | Facility: CLINIC | Age: 25
End: 2020-08-20

## 2020-08-20 DIAGNOSIS — E11.649 HYPOGLYCEMIA ASSOCIATED WITH DIABETES: ICD-10-CM

## 2020-08-20 DIAGNOSIS — E10.22 TYPE 1 DIABETES MELLITUS WITH STAGE 3 CHRONIC KIDNEY DISEASE: ICD-10-CM

## 2020-08-20 DIAGNOSIS — D50.8 OTHER IRON DEFICIENCY ANEMIA: Primary | ICD-10-CM

## 2020-08-20 DIAGNOSIS — N18.30 TYPE 1 DIABETES MELLITUS WITH STAGE 3 CHRONIC KIDNEY DISEASE: ICD-10-CM

## 2020-08-20 PROBLEM — D50.9 IRON DEFICIENCY ANEMIA: Status: ACTIVE | Noted: 2020-08-20

## 2020-08-20 LAB
B2 GLYCOPROT1 IGA SER QL: <9 SAU
B2 GLYCOPROT1 IGG SER QL: <9 SGU
B2 GLYCOPROT1 IGM SER QL: <9 SMU
CARDIOLIPIN IGG SER IA-ACNC: <9.4 GPL (ref 0–14.99)
CARDIOLIPIN IGM SER IA-ACNC: <9.4 MPL (ref 0–12.49)

## 2020-08-20 PROCEDURE — 99214 OFFICE O/P EST MOD 30 MIN: CPT | Mod: 95,,, | Performed by: INTERNAL MEDICINE

## 2020-08-20 PROCEDURE — 99214 PR OFFICE/OUTPT VISIT, EST, LEVL IV, 30-39 MIN: ICD-10-PCS | Mod: 95,,, | Performed by: INTERNAL MEDICINE

## 2020-08-20 RX ORDER — DIPHENHYDRAMINE HYDROCHLORIDE 50 MG/ML
50 INJECTION INTRAMUSCULAR; INTRAVENOUS ONCE AS NEEDED
Status: CANCELLED | OUTPATIENT
Start: 2020-08-27

## 2020-08-20 RX ORDER — HEPARIN 100 UNIT/ML
500 SYRINGE INTRAVENOUS
Status: CANCELLED | OUTPATIENT
Start: 2020-08-27

## 2020-08-20 RX ORDER — SODIUM CHLORIDE 0.9 % (FLUSH) 0.9 %
10 SYRINGE (ML) INJECTION
Status: CANCELLED | OUTPATIENT
Start: 2020-08-27

## 2020-08-20 RX ORDER — METHYLPREDNISOLONE SOD SUCC 125 MG
125 VIAL (EA) INJECTION ONCE AS NEEDED
Status: CANCELLED | OUTPATIENT
Start: 2020-08-27

## 2020-08-20 RX ORDER — EPINEPHRINE 0.3 MG/.3ML
0.3 INJECTION SUBCUTANEOUS ONCE AS NEEDED
Status: CANCELLED | OUTPATIENT
Start: 2020-08-27

## 2020-08-20 NOTE — PATIENT INSTRUCTIONS
Your new instructions:    Reduce your lantus to 17 units at bedtime   Use humalog 1:10 before meals (instead of 1:9 or prespecified doses)  Change your correction to 1:35 as you are having too many lows after a correction    For treatment of low blood sugars:   If your blood sugar is less than 70 but higher than 60, and you are not having any symptoms, please make sure you check your blood sugar again in 10 - 15 minutes, avoid tasks such as driving. If the repeat value is still in the same range, please drink a 1/4 cup of orange juice or 1 - 2 glucose tablets.     If your blood sugar reading is under 60, whether you are symptomatic or not, please treat. You can do this with 3 oral glucose tablets, juice, milk, other snacks, or a meal. Make sure you check 15 minutes after you treat.

## 2020-08-20 NOTE — ASSESSMENT & PLAN NOTE
Patient feels comfortable carb counting, this is the best way to proceed as she changes her prescribed dose anyway.     Have asked her to try and use 1:10 insulin to carb ratio based on TDD of 40 units (average for the last few days).    Lantus 17 units at bedtime -- hypoglycemia  Humalog (currently on 1:25)  ISF 1:35  Humalog:  ICHO 1:10 carb counting for all meals   She is not using prescribed dosing at all    overtreats for hyperglycemia which leads to hypoglycemia (may be compounded by lantus as well)  Seen by DM education    We discussed insulin pump, wants to think about it.

## 2020-08-21 ENCOUNTER — TELEPHONE (OUTPATIENT)
Dept: HEMATOLOGY/ONCOLOGY | Facility: CLINIC | Age: 25
End: 2020-08-21

## 2020-08-21 NOTE — CARE UPDATE
After discussing with MFM, talked to patient regarding getting Iron Infusions to help with severe anemia. Discussed risks and benefits she verbalized understanding and requested to get the infusions.

## 2020-08-26 ENCOUNTER — TELEPHONE (OUTPATIENT)
Dept: INFECTIOUS DISEASES | Facility: HOSPITAL | Age: 25
End: 2020-08-26

## 2020-08-28 ENCOUNTER — INFUSION (OUTPATIENT)
Dept: INFECTIOUS DISEASES | Facility: HOSPITAL | Age: 25
End: 2020-08-28
Attending: INTERNAL MEDICINE
Payer: COMMERCIAL

## 2020-08-28 ENCOUNTER — OFFICE VISIT (OUTPATIENT)
Dept: NEPHROLOGY | Facility: CLINIC | Age: 25
End: 2020-08-28
Payer: COMMERCIAL

## 2020-08-28 ENCOUNTER — LAB VISIT (OUTPATIENT)
Dept: LAB | Facility: HOSPITAL | Age: 25
End: 2020-08-28
Attending: INTERNAL MEDICINE
Payer: COMMERCIAL

## 2020-08-28 VITALS
DIASTOLIC BLOOD PRESSURE: 98 MMHG | HEIGHT: 64 IN | SYSTOLIC BLOOD PRESSURE: 140 MMHG | HEART RATE: 88 BPM | BODY MASS INDEX: 26.61 KG/M2 | OXYGEN SATURATION: 100 % | WEIGHT: 155.88 LBS

## 2020-08-28 VITALS
HEART RATE: 90 BPM | WEIGHT: 155.19 LBS | DIASTOLIC BLOOD PRESSURE: 73 MMHG | HEIGHT: 64 IN | RESPIRATION RATE: 16 BRPM | SYSTOLIC BLOOD PRESSURE: 135 MMHG | BODY MASS INDEX: 26.49 KG/M2 | TEMPERATURE: 98 F | OXYGEN SATURATION: 100 %

## 2020-08-28 DIAGNOSIS — O99.012 ANEMIA DURING PREGNANCY IN SECOND TRIMESTER: ICD-10-CM

## 2020-08-28 DIAGNOSIS — N18.30 CHRONIC KIDNEY DISEASE (CKD), STAGE III (MODERATE): Primary | ICD-10-CM

## 2020-08-28 DIAGNOSIS — N18.30 CHRONIC KIDNEY DISEASE (CKD), STAGE III (MODERATE): ICD-10-CM

## 2020-08-28 DIAGNOSIS — D50.8 OTHER IRON DEFICIENCY ANEMIA: Primary | ICD-10-CM

## 2020-08-28 DIAGNOSIS — O99.011 ANEMIA DURING PREGNANCY IN FIRST TRIMESTER: ICD-10-CM

## 2020-08-28 LAB
ALBUMIN SERPL BCP-MCNC: 2 G/DL (ref 3.5–5.2)
ANION GAP SERPL CALC-SCNC: 5 MMOL/L (ref 8–16)
BASOPHILS # BLD AUTO: 0.03 K/UL (ref 0–0.2)
BASOPHILS NFR BLD: 0.5 % (ref 0–1.9)
BUN SERPL-MCNC: 28 MG/DL (ref 6–20)
CALCIUM SERPL-MCNC: 8.1 MG/DL (ref 8.7–10.5)
CHLORIDE SERPL-SCNC: 108 MMOL/L (ref 95–110)
CO2 SERPL-SCNC: 23 MMOL/L (ref 23–29)
CREAT SERPL-MCNC: 1.8 MG/DL (ref 0.5–1.4)
DIFFERENTIAL METHOD: ABNORMAL
EOSINOPHIL # BLD AUTO: 0.1 K/UL (ref 0–0.5)
EOSINOPHIL NFR BLD: 1.9 % (ref 0–8)
ERYTHROCYTE [DISTWIDTH] IN BLOOD BY AUTOMATED COUNT: 12.6 % (ref 11.5–14.5)
EST. GFR  (AFRICAN AMERICAN): 44.5 ML/MIN/1.73 M^2
EST. GFR  (NON AFRICAN AMERICAN): 38.6 ML/MIN/1.73 M^2
GLUCOSE SERPL-MCNC: 127 MG/DL (ref 70–110)
HCT VFR BLD AUTO: 23.8 % (ref 37–48.5)
HGB BLD-MCNC: 7.2 G/DL (ref 12–16)
IMM GRANULOCYTES # BLD AUTO: 0.02 K/UL (ref 0–0.04)
IMM GRANULOCYTES NFR BLD AUTO: 0.3 % (ref 0–0.5)
LYMPHOCYTES # BLD AUTO: 2 K/UL (ref 1–4.8)
LYMPHOCYTES NFR BLD: 33.5 % (ref 18–48)
MCH RBC QN AUTO: 28.6 PG (ref 27–31)
MCHC RBC AUTO-ENTMCNC: 30.3 G/DL (ref 32–36)
MCV RBC AUTO: 94 FL (ref 82–98)
MONOCYTES # BLD AUTO: 0.5 K/UL (ref 0.3–1)
MONOCYTES NFR BLD: 8.4 % (ref 4–15)
NEUTROPHILS # BLD AUTO: 3.2 K/UL (ref 1.8–7.7)
NEUTROPHILS NFR BLD: 55.4 % (ref 38–73)
NRBC BLD-RTO: 0 /100 WBC
PHOSPHATE SERPL-MCNC: 4.5 MG/DL (ref 2.7–4.5)
PLATELET # BLD AUTO: 341 K/UL (ref 150–350)
PMV BLD AUTO: 10.3 FL (ref 9.2–12.9)
POTASSIUM SERPL-SCNC: 4.4 MMOL/L (ref 3.5–5.1)
RBC # BLD AUTO: 2.52 M/UL (ref 4–5.4)
SODIUM SERPL-SCNC: 136 MMOL/L (ref 136–145)
WBC # BLD AUTO: 5.85 K/UL (ref 3.9–12.7)

## 2020-08-28 PROCEDURE — 80069 RENAL FUNCTION PANEL: CPT

## 2020-08-28 PROCEDURE — 63600175 PHARM REV CODE 636 W HCPCS: Performed by: INTERNAL MEDICINE

## 2020-08-28 PROCEDURE — 85025 COMPLETE CBC W/AUTO DIFF WBC: CPT

## 2020-08-28 PROCEDURE — 36415 COLL VENOUS BLD VENIPUNCTURE: CPT

## 2020-08-28 PROCEDURE — 99215 OFFICE O/P EST HI 40 MIN: CPT | Mod: S$GLB,,, | Performed by: INTERNAL MEDICINE

## 2020-08-28 PROCEDURE — 99999 PR PBB SHADOW E&M-EST. PATIENT-LVL V: ICD-10-PCS | Mod: PBBFAC,,, | Performed by: INTERNAL MEDICINE

## 2020-08-28 PROCEDURE — 96365 THER/PROPH/DIAG IV INF INIT: CPT

## 2020-08-28 PROCEDURE — 25000003 PHARM REV CODE 250: Performed by: INTERNAL MEDICINE

## 2020-08-28 PROCEDURE — 99215 PR OFFICE/OUTPT VISIT, EST, LEVL V, 40-54 MIN: ICD-10-PCS | Mod: S$GLB,,, | Performed by: INTERNAL MEDICINE

## 2020-08-28 PROCEDURE — 99999 PR PBB SHADOW E&M-EST. PATIENT-LVL V: CPT | Mod: PBBFAC,,, | Performed by: INTERNAL MEDICINE

## 2020-08-28 RX ORDER — EPINEPHRINE 0.3 MG/.3ML
0.3 INJECTION SUBCUTANEOUS ONCE AS NEEDED
Status: CANCELLED | OUTPATIENT
Start: 2020-08-31

## 2020-08-28 RX ORDER — METHYLPREDNISOLONE SOD SUCC 125 MG
125 VIAL (EA) INJECTION ONCE AS NEEDED
Status: CANCELLED | OUTPATIENT
Start: 2020-08-31

## 2020-08-28 RX ORDER — METHYLPREDNISOLONE SOD SUCC 125 MG
125 VIAL (EA) INJECTION ONCE AS NEEDED
Status: DISCONTINUED | OUTPATIENT
Start: 2020-08-28 | End: 2020-08-28 | Stop reason: HOSPADM

## 2020-08-28 RX ORDER — DIPHENHYDRAMINE HYDROCHLORIDE 50 MG/ML
50 INJECTION INTRAMUSCULAR; INTRAVENOUS ONCE AS NEEDED
Status: DISCONTINUED | OUTPATIENT
Start: 2020-08-28 | End: 2020-08-28 | Stop reason: HOSPADM

## 2020-08-28 RX ORDER — HEPARIN 100 UNIT/ML
500 SYRINGE INTRAVENOUS
Status: CANCELLED | OUTPATIENT
Start: 2020-08-31

## 2020-08-28 RX ORDER — HEPARIN 100 UNIT/ML
500 SYRINGE INTRAVENOUS
Status: DISCONTINUED | OUTPATIENT
Start: 2020-08-28 | End: 2020-08-28 | Stop reason: HOSPADM

## 2020-08-28 RX ORDER — SODIUM CHLORIDE 0.9 % (FLUSH) 0.9 %
10 SYRINGE (ML) INJECTION
Status: DISCONTINUED | OUTPATIENT
Start: 2020-08-28 | End: 2020-08-28 | Stop reason: HOSPADM

## 2020-08-28 RX ORDER — EPINEPHRINE 0.3 MG/.3ML
0.3 INJECTION SUBCUTANEOUS ONCE AS NEEDED
Status: DISCONTINUED | OUTPATIENT
Start: 2020-08-28 | End: 2020-08-28 | Stop reason: HOSPADM

## 2020-08-28 RX ORDER — DIPHENHYDRAMINE HYDROCHLORIDE 50 MG/ML
50 INJECTION INTRAMUSCULAR; INTRAVENOUS ONCE AS NEEDED
Status: CANCELLED | OUTPATIENT
Start: 2020-08-31

## 2020-08-28 RX ORDER — SODIUM CHLORIDE 0.9 % (FLUSH) 0.9 %
10 SYRINGE (ML) INJECTION
Status: CANCELLED | OUTPATIENT
Start: 2020-08-31

## 2020-08-28 RX ADMIN — IRON SUCROSE 200 MG: 20 INJECTION, SOLUTION INTRAVENOUS at 10:08

## 2020-08-28 RX ADMIN — SODIUM CHLORIDE: 9 INJECTION, SOLUTION INTRAVENOUS at 10:08

## 2020-08-28 NOTE — PROGRESS NOTES
Patient arrived for Venofer infusion 1/5.  Patient observed for one hour post infusion. No signs/symptoms of reaction observed.  Patient tolerated infusion well and left in NAD.

## 2020-08-30 NOTE — PROGRESS NOTES
REASON FOR Follow up/CHIEF COMPLAIN: Chronic Kidney disease in pregnancy    REFERRING PHYSICIAN: Primary Doctor No    HISTORY OF PRESENT ILLNESS: 25 y.o. female   has a past medical history of Anemia, Chronic kidney disease, Diabetes mellitus, Hypertension, Nephrotic syndrome, and Restrictive lung disease. patient was referred here for abnormal renal function. She has had DM 1 since age 8, HTN since 10 year ago, started taking medications for HTN since 2018. No chronic NSAID use, no known exposure to lithium, lead.    Denied any issues with urination including dysuria, hematuria, urgency, hesitancy, nocturia, incomplete voiding. Denied chest pain, nausea, vomiting, abd pain, nausea, vomiting, diarrhea, shortness of breath, Orthopnea, PND. Has some pedal edema which is no different when compared to her pre pregnancy.  Home Blood pressures are checked intermittently no log brought.      ROS:  General: negative for chills, or fatigue  ENT: No epistaxis or headaches  Hematological and Lymphatic: No bleeding problems or blood clots.  Endocrine: No skin changes or temperature intolerance  Respiratory: No cough, shortness of breath, or wheezing  Cardiovascular: No chest pain or dyspnea   Gastrointestinal: No abdominal pain, change in bowel habits  Genito-Urinary: No dysuria, trouble voiding, or hematuria  Musculoskeletal: ROS: negative for - joint pain, joint stiffness, joint swelling, muscle pain or muscular weakness  Neurological: No new focal weakness, no numbness  Dermatological: No rash or ulcers.    PAST MEDICAL HISTORY:  Past Medical History:   Diagnosis Date    Anemia     Chronic kidney disease     Diabetes mellitus     Hypertension     Nephrotic syndrome     Restrictive lung disease        PAST SURGICAL HISTORY:  No past surgical history on file.    FAMILY HISTORY:   Family History   Problem Relation Age of Onset    Heart disease Father     Diabetes Brother        SOCIAL HISTORY:  Social History      Socioeconomic History    Marital status: Single     Spouse name: Not on file    Number of children: Not on file    Years of education: Not on file    Highest education level: Not on file   Occupational History    Occupation:  at [employer]   Social Needs    Financial resource strain: Not on file    Food insecurity     Worry: Not on file     Inability: Not on file    Transportation needs     Medical: Not on file     Non-medical: Not on file   Tobacco Use    Smoking status: Never Smoker    Smokeless tobacco: Never Used   Substance and Sexual Activity    Alcohol use: No    Drug use: No    Sexual activity: Yes     Partners: Male     Comment: monogamous   Lifestyle    Physical activity     Days per week: Not on file     Minutes per session: Not on file    Stress: Not on file   Relationships    Social connections     Talks on phone: Not on file     Gets together: Not on file     Attends Muslim service: Not on file     Active member of club or organization: Not on file     Attends meetings of clubs or organizations: Not on file     Relationship status: Not on file   Other Topics Concern    Not on file   Social History Narrative    Not on file       ALLERGIES:  Review of patient's allergies indicates:  No Known Allergies    MEDICATIONS:    Current Outpatient Medications:     blood sugar diagnostic Strp, To check BG 4 - 6 times daily, to use with insurance preferred meter, Disp: 150 each, Rfl: 11    blood-glucose meter kit, To check BG 4 times daily, to use with insurance preferred meter, Disp: 1 each, Rfl: 1    carvediloL (COREG) 12.5 MG tablet, Take 1 tablet (12.5 mg total) by mouth 2 (two) times daily., Disp: 60 tablet, Rfl: 11    citalopram (CELEXA) 20 MG tablet, Take 1 tablet (20 mg total) by mouth once daily., Disp: 90 tablet, Rfl: 0    enoxaparin (LOVENOX) 40 mg/0.4 mL Syrg, Inject 0.4 mLs (40 mg total) into the skin once daily., Disp: 100 mL, Rfl: 3    ergocalciferol  "(ERGOCALCIFEROL) 50,000 unit Cap, Take 1 capsule (50,000 Units total) by mouth every 7 days., Disp: 12 capsule, Rfl: 1    ferrous sulfate 325 (65 FE) MG EC tablet, Take 1 tablet (325 mg total) by mouth 2 (two) times daily., Disp: 180 tablet, Rfl: 2    flash glucose scanning reader (FREESTYLE MARCY 14 DAY READER) Misc, 1 each by Misc.(Non-Drug; Combo Route) route once daily., Disp: 1 each, Rfl: 0    flash glucose sensor (FREESTYLE MARCY 14 DAY SENSOR) Kit, Route 1 every fourteen (fourteen ) days., Disp: 2 kit, Rfl: 11    fluocinolone and shower cap (DERMA-SMOOTHE/FS SCALP OIL) 0.01 % Oil, Apply oil to damp scalp nightly and cover with shower cap., Disp: 1 Bottle, Rfl: 3    hydroCHLOROthiazide (HYDRODIURIL) 25 MG tablet, , Disp: , Rfl:     insulin (LANTUS SOLOSTAR U-100 INSULIN) glargine 100 units/mL (3mL) SubQ pen, Inject 20 Units into the skin every evening, Disp: 15 mL, Rfl: 6    insulin lispro (HUMALOG KWIKPEN INSULIN) 100 unit/mL pen, Inject 10 units into skin the skin 3 three times daily with meals, Disp: 15 mL, Rfl: 6    iron,carbon,gluc-FA-B12-C-dss (FERRALET 90 DUAL/CITRANATAL BLOOM) 90-1-12-50 mg-mg-mcg-mg Tab, Take 1 tablet by mouth once daily., Disp: 30 tablet, Rfl: 6    ketoconazole (NIZORAL) 2 % shampoo, Wash hair with medicated shampoo at least 2x/week - let sit on scalp at least 5 minutes prior to rinsing, Disp: 120 mL, Rfl: 5    lancets Misc, To check BG 4 - 6 times daily, to use with insurance preferred meter, Disp: 150 each, Rfl: 11    NIFEdipine (PROCARDIA-XL) 30 MG (OSM) 24 hr tablet, Take 1 tablet (30 mg total) by mouth once daily., Disp: 30 tablet, Rfl: 11    norethindrone (ORTHO MICRONOR) 0.35 mg tablet, Take 1 tablet (0.35 mg total) by mouth once daily., Disp: 90 tablet, Rfl: 3    pen needle, diabetic 32 gauge x 5/32" Ndle, For three times daily injection, Disp: 100 each, Rfl: 11    albuterol (VENTOLIN HFA) 90 mcg/actuation inhaler, Inhale into the lungs., Disp: , Rfl:  "   Medication List with Changes/Refills   Current Medications    ALBUTEROL (VENTOLIN HFA) 90 MCG/ACTUATION INHALER    Inhale into the lungs.    BLOOD SUGAR DIAGNOSTIC STRP    To check BG 4 - 6 times daily, to use with insurance preferred meter    BLOOD-GLUCOSE METER KIT    To check BG 4 times daily, to use with insurance preferred meter    CARVEDILOL (COREG) 12.5 MG TABLET    Take 1 tablet (12.5 mg total) by mouth 2 (two) times daily.    CITALOPRAM (CELEXA) 20 MG TABLET    Take 1 tablet (20 mg total) by mouth once daily.    ENOXAPARIN (LOVENOX) 40 MG/0.4 ML SYRG    Inject 0.4 mLs (40 mg total) into the skin once daily.    ERGOCALCIFEROL (ERGOCALCIFEROL) 50,000 UNIT CAP    Take 1 capsule (50,000 Units total) by mouth every 7 days.    FERROUS SULFATE 325 (65 FE) MG EC TABLET    Take 1 tablet (325 mg total) by mouth 2 (two) times daily.    FLASH GLUCOSE SCANNING READER (FREESTYLE MARCY 14 DAY READER) MISC    1 each by Misc.(Non-Drug; Combo Route) route once daily.    FLASH GLUCOSE SENSOR (FREESTYLE MARCY 14 DAY SENSOR) KIT    Route 1 every fourteen (fourteen ) days.    FLUOCINOLONE AND SHOWER CAP (DERMA-SMOOTHE/FS SCALP OIL) 0.01 % OIL    Apply oil to damp scalp nightly and cover with shower cap.    HYDROCHLOROTHIAZIDE (HYDRODIURIL) 25 MG TABLET        INSULIN (LANTUS SOLOSTAR U-100 INSULIN) GLARGINE 100 UNITS/ML (3ML) SUBQ PEN    Inject 20 Units into the skin every evening    INSULIN LISPRO (HUMALOG KWIKPEN INSULIN) 100 UNIT/ML PEN    Inject 10 units into skin the skin 3 three times daily with meals    IRON,CARBON,GLUC-FA-B12-C-DSS (FERRALET 90 DUAL/CITRANATAL BLOOM) 90-1-12-50 MG-MG-MCG-MG TAB    Take 1 tablet by mouth once daily.    KETOCONAZOLE (NIZORAL) 2 % SHAMPOO    Wash hair with medicated shampoo at least 2x/week - let sit on scalp at least 5 minutes prior to rinsing    LANCETS MISC    To check BG 4 - 6 times daily, to use with insurance preferred meter    NIFEDIPINE (PROCARDIA-XL) 30 MG (OSM) 24 HR TABLET     "Take 1 tablet (30 mg total) by mouth once daily.    NORETHINDRONE (ORTHO MICRONOR) 0.35 MG TABLET    Take 1 tablet (0.35 mg total) by mouth once daily.    PEN NEEDLE, DIABETIC 32 GAUGE X 5/32" NDLE    For three times daily injection        PHYSICAL EXAM:  BP (!) 140/98   Pulse 88   Ht 5' 4" (1.626 m)   Wt 70.7 kg (155 lb 13.8 oz)   LMP  (LMP Unknown) Comment: pt. states last cycle was in May. She is not sure of the exact date.  SpO2 100%   BMI 26.75 kg/m²     General: No distress, No fever or chills  Head: Normocephalic,atraumatic  Eyes: conjunctivae/corneas clear. PERRL, EOM's intact.  Nose: Nares normal. Mucosa normal. No drainage or sinus tenderness.  Neck: No adenopathy,no carotid bruit,no JVD  Lungs:Clear to auscultation bilaterally, No Crackles  Heart: Regular rate and rhythm, no murmur, gallops or rubs  Abdomen: Soft, no tenderness, bowel sounds normal  Extremities: Atraumatic, chronic edema around ankles  Skin: Turgor normal. No rashes or ulcers  Neurologic: No focal weakness, oriented.  Dialysis Access: Non applicable         LABS:  Lab Results   Component Value Date    HGB 7.2 (L) 08/28/2020        Lab Results   Component Value Date    CREATININE 1.8 (H) 08/28/2020       Prot/Creat Ratio, Ur   Date Value Ref Range Status   08/28/2020 9.40 (H) 0.00 - 0.20 Final   07/29/2020 7.88 (H) 0.00 - 0.20 Final       Lab Results   Component Value Date     08/28/2020    K 4.4 08/28/2020    CO2 23 08/28/2020       last PTH   Lab Results   Component Value Date    .1 (H) 08/05/2020    CALCIUM 8.1 (L) 08/28/2020    PHOS 4.5 08/28/2020       Lab Results   Component Value Date    HGBA1C 14.0 (H) 08/05/2020       Lab Results   Component Value Date    LDLCALC 171.2 (H) 02/25/2019         Creatinine, Random Ur   Date Value Ref Range Status   08/28/2020 53.0 15.0 - 325.0 mg/dL Final     Comment:     The random urine reference ranges provided were established   for 24 hour urine collections.  No reference " ranges exist for  random urine specimens.  Correlate clinically.     07/29/2020 58.0 15.0 - 325.0 mg/dL Final     Comment:     The random urine reference ranges provided were established   for 24 hour urine collections.  No reference ranges exist for  random urine specimens.  Correlate clinically.     02/25/2019 122.0 15.0 - 325.0 mg/dL Final     Comment:     The random urine reference ranges provided were established   for 24 hour urine collections.  No reference ranges exist for  random urine specimens.  Correlate clinically.         Protein, Urine   Date Value Ref Range Status   08/05/2020 503 (H) 0 - 15 mg/dL Final     Protein, Urine Random   Date Value Ref Range Status   08/28/2020 498 (H) 0 - 15 mg/dL Final     Comment:     The random urine reference ranges provided were established   for 24 hour urine collections.  No reference ranges exist for  random urine specimens.  Correlate clinically.     07/29/2020 457 (H) 0 - 15 mg/dL Final     Comment:     The random urine reference ranges provided were established   for 24 hour urine collections.  No reference ranges exist for  random urine specimens.  Correlate clinically.         Prot/Creat Ratio, Ur   Date Value Ref Range Status   08/28/2020 9.40 (H) 0.00 - 0.20 Final   07/29/2020 7.88 (H) 0.00 - 0.20 Final         ASSESSMENT:  1) Chronic Kidney Disease stage 3  2) Nephrotic Range Proteinuria  3) Hypoalbuminemia  4) Hyperkalemia - improved  5) 13 weeks pregnant   Patients creatinine has been 1.8 mg/dl in 2019 prior to her pregnancy. Since her pregnancy started it has been around 2.1 mg/dl. Her BUN to between 32-38. I worry that she most likely will need dialysis eventually to keep her BUN below 50 during this pregnancy.    She also has significant proteinuria. Her Nephrotic range proteinuria might improve a little once her blood pressure is under better control but I worry that she will be at high risk of thrombosis. She has been started on Lovenox 40 mg, at  this point dont have any better option, continue the same.   Educated about low potassium diet. Acid-base in acceptable range. Continue Vitamin D.  - Renal Ultrasound.    6) Uncontrolled HTN   Patient reports that the blood pressure is little high during the visit. Her pressures during this am is 140/98 mmhg. Requested patient to check pressures twice a day and bring log. She is now on Coreg 12.5 mg BID, HCTZ 25 mg daily and Procardia 30 mg daily. Plan is to increase the procardia to twice a day if pressures continue to be high.    7) Anemia  Discussed with infusion center, [facility] and started patient on IV Iron, first dose today.    8) Vitamin D deficiency  Continue Ergocalciferol    9) DM  Being followed by endocrine.    Very high risk for mother and baby. Patients mother came to the appointment today explained to her about the complexicity of her care, potential need for dialysis in future and the high risk to baby and mother. Pts mother had a minor panic attack and had to lay down (vitals stable), we let her (pts mother) rest for few mins in the clinic and let them go once calmed down. Will continue the discussions over the next visits.  Mother requested if patient could see a psychiatrist, pt denied any suicidal or homicidal ideation but want to discuss with therapist. Referral placed.    RTC in 2 weeks.    Diagnosis and plan of care explained to the patient and mother.  Both Verbalized understanding. Answered all questions.  Thanks for allowing me to participate in the care of this patient.     3:59 PM    Oswald Kruger MD  Nephrology & Critical Care

## 2020-08-31 ENCOUNTER — INFUSION (OUTPATIENT)
Dept: INFECTIOUS DISEASES | Facility: HOSPITAL | Age: 25
End: 2020-08-31
Attending: INTERNAL MEDICINE
Payer: COMMERCIAL

## 2020-08-31 VITALS
SYSTOLIC BLOOD PRESSURE: 115 MMHG | TEMPERATURE: 98 F | DIASTOLIC BLOOD PRESSURE: 59 MMHG | WEIGHT: 153.13 LBS | HEART RATE: 97 BPM | HEIGHT: 64 IN | RESPIRATION RATE: 20 BRPM | BODY MASS INDEX: 26.14 KG/M2 | OXYGEN SATURATION: 100 %

## 2020-08-31 DIAGNOSIS — O99.012 ANEMIA DURING PREGNANCY IN SECOND TRIMESTER: ICD-10-CM

## 2020-08-31 DIAGNOSIS — N18.30 CHRONIC KIDNEY DISEASE (CKD), STAGE III (MODERATE): ICD-10-CM

## 2020-08-31 DIAGNOSIS — D50.8 OTHER IRON DEFICIENCY ANEMIA: Primary | ICD-10-CM

## 2020-08-31 PROCEDURE — 63600175 PHARM REV CODE 636 W HCPCS: Performed by: INTERNAL MEDICINE

## 2020-08-31 PROCEDURE — 96365 THER/PROPH/DIAG IV INF INIT: CPT

## 2020-08-31 PROCEDURE — 25000003 PHARM REV CODE 250: Performed by: INTERNAL MEDICINE

## 2020-08-31 RX ORDER — EPINEPHRINE 0.3 MG/.3ML
0.3 INJECTION SUBCUTANEOUS ONCE AS NEEDED
Status: CANCELLED | OUTPATIENT
Start: 2020-09-03

## 2020-08-31 RX ORDER — SODIUM CHLORIDE 0.9 % (FLUSH) 0.9 %
10 SYRINGE (ML) INJECTION
Status: DISCONTINUED | OUTPATIENT
Start: 2020-08-31 | End: 2020-08-31 | Stop reason: HOSPADM

## 2020-08-31 RX ORDER — DIPHENHYDRAMINE HYDROCHLORIDE 50 MG/ML
50 INJECTION INTRAMUSCULAR; INTRAVENOUS ONCE AS NEEDED
Status: CANCELLED | OUTPATIENT
Start: 2020-09-03

## 2020-08-31 RX ORDER — HEPARIN 100 UNIT/ML
500 SYRINGE INTRAVENOUS
Status: CANCELLED | OUTPATIENT
Start: 2020-09-03

## 2020-08-31 RX ORDER — DIPHENHYDRAMINE HYDROCHLORIDE 50 MG/ML
50 INJECTION INTRAMUSCULAR; INTRAVENOUS ONCE AS NEEDED
Status: DISCONTINUED | OUTPATIENT
Start: 2020-08-31 | End: 2020-08-31 | Stop reason: HOSPADM

## 2020-08-31 RX ORDER — EPINEPHRINE 0.3 MG/.3ML
0.3 INJECTION SUBCUTANEOUS ONCE AS NEEDED
Status: DISCONTINUED | OUTPATIENT
Start: 2020-08-31 | End: 2020-08-31 | Stop reason: HOSPADM

## 2020-08-31 RX ORDER — METHYLPREDNISOLONE SOD SUCC 125 MG
125 VIAL (EA) INJECTION ONCE AS NEEDED
Status: DISCONTINUED | OUTPATIENT
Start: 2020-08-31 | End: 2020-08-31 | Stop reason: HOSPADM

## 2020-08-31 RX ORDER — HEPARIN 100 UNIT/ML
500 SYRINGE INTRAVENOUS
Status: DISCONTINUED | OUTPATIENT
Start: 2020-08-31 | End: 2020-08-31 | Stop reason: HOSPADM

## 2020-08-31 RX ORDER — SODIUM CHLORIDE 0.9 % (FLUSH) 0.9 %
10 SYRINGE (ML) INJECTION
Status: CANCELLED | OUTPATIENT
Start: 2020-09-03

## 2020-08-31 RX ORDER — METHYLPREDNISOLONE SOD SUCC 125 MG
125 VIAL (EA) INJECTION ONCE AS NEEDED
Status: CANCELLED | OUTPATIENT
Start: 2020-09-03

## 2020-08-31 RX ADMIN — SODIUM CHLORIDE: 9 INJECTION, SOLUTION INTRAVENOUS at 02:08

## 2020-08-31 RX ADMIN — IRON SUCROSE 200 MG: 20 INJECTION, SOLUTION INTRAVENOUS at 02:08

## 2020-08-31 NOTE — PROGRESS NOTES
Patient arrived for Venofer infusion 2/5.  Patient observed for 30 minutes post infusion. No signs/symptoms of reaction observed.  Patient tolerated infusion well and left in NAD.

## 2020-09-02 ENCOUNTER — LAB VISIT (OUTPATIENT)
Dept: LAB | Facility: HOSPITAL | Age: 25
End: 2020-09-02
Attending: INTERNAL MEDICINE
Payer: COMMERCIAL

## 2020-09-02 DIAGNOSIS — E10.22 TYPE 1 DIABETES MELLITUS WITH DIABETIC CHRONIC KIDNEY DISEASE, UNSPECIFIED CKD STAGE: ICD-10-CM

## 2020-09-02 LAB
ESTIMATED AVG GLUCOSE: 260 MG/DL (ref 68–131)
HBA1C MFR BLD HPLC: 10.7 % (ref 4–5.6)

## 2020-09-02 PROCEDURE — 82985 ASSAY OF GLYCATED PROTEIN: CPT

## 2020-09-02 PROCEDURE — 83036 HEMOGLOBIN GLYCOSYLATED A1C: CPT

## 2020-09-03 ENCOUNTER — INFUSION (OUTPATIENT)
Dept: INFECTIOUS DISEASES | Facility: HOSPITAL | Age: 25
End: 2020-09-03
Attending: INTERNAL MEDICINE
Payer: COMMERCIAL

## 2020-09-03 VITALS
WEIGHT: 152.56 LBS | DIASTOLIC BLOOD PRESSURE: 70 MMHG | BODY MASS INDEX: 26.19 KG/M2 | HEART RATE: 97 BPM | TEMPERATURE: 99 F | OXYGEN SATURATION: 100 % | RESPIRATION RATE: 16 BRPM | SYSTOLIC BLOOD PRESSURE: 132 MMHG

## 2020-09-03 DIAGNOSIS — D50.8 OTHER IRON DEFICIENCY ANEMIA: Primary | ICD-10-CM

## 2020-09-03 DIAGNOSIS — O99.012 ANEMIA DURING PREGNANCY IN SECOND TRIMESTER: ICD-10-CM

## 2020-09-03 DIAGNOSIS — N18.30 CHRONIC KIDNEY DISEASE (CKD), STAGE III (MODERATE): ICD-10-CM

## 2020-09-03 PROCEDURE — 63600175 PHARM REV CODE 636 W HCPCS: Performed by: INTERNAL MEDICINE

## 2020-09-03 PROCEDURE — 96365 THER/PROPH/DIAG IV INF INIT: CPT

## 2020-09-03 PROCEDURE — 25000003 PHARM REV CODE 250: Performed by: INTERNAL MEDICINE

## 2020-09-03 RX ORDER — EPINEPHRINE 0.3 MG/.3ML
0.3 INJECTION SUBCUTANEOUS ONCE AS NEEDED
Status: DISCONTINUED | OUTPATIENT
Start: 2020-09-03 | End: 2020-09-03 | Stop reason: HOSPADM

## 2020-09-03 RX ORDER — METHYLPREDNISOLONE SOD SUCC 125 MG
125 VIAL (EA) INJECTION ONCE AS NEEDED
Status: DISCONTINUED | OUTPATIENT
Start: 2020-09-03 | End: 2020-09-03 | Stop reason: HOSPADM

## 2020-09-03 RX ORDER — DIPHENHYDRAMINE HYDROCHLORIDE 50 MG/ML
50 INJECTION INTRAMUSCULAR; INTRAVENOUS ONCE AS NEEDED
Status: CANCELLED | OUTPATIENT
Start: 2020-09-06

## 2020-09-03 RX ORDER — EPINEPHRINE 0.3 MG/.3ML
0.3 INJECTION SUBCUTANEOUS ONCE AS NEEDED
Status: CANCELLED | OUTPATIENT
Start: 2020-09-06

## 2020-09-03 RX ORDER — HEPARIN 100 UNIT/ML
500 SYRINGE INTRAVENOUS
Status: CANCELLED | OUTPATIENT
Start: 2020-09-06

## 2020-09-03 RX ORDER — METHYLPREDNISOLONE SOD SUCC 125 MG
125 VIAL (EA) INJECTION ONCE AS NEEDED
Status: CANCELLED | OUTPATIENT
Start: 2020-09-06

## 2020-09-03 RX ORDER — SODIUM CHLORIDE 0.9 % (FLUSH) 0.9 %
10 SYRINGE (ML) INJECTION
Status: CANCELLED | OUTPATIENT
Start: 2020-09-06

## 2020-09-03 RX ORDER — SODIUM CHLORIDE 0.9 % (FLUSH) 0.9 %
10 SYRINGE (ML) INJECTION
Status: DISCONTINUED | OUTPATIENT
Start: 2020-09-03 | End: 2020-09-03 | Stop reason: HOSPADM

## 2020-09-03 RX ORDER — DIPHENHYDRAMINE HYDROCHLORIDE 50 MG/ML
50 INJECTION INTRAMUSCULAR; INTRAVENOUS ONCE AS NEEDED
Status: DISCONTINUED | OUTPATIENT
Start: 2020-09-03 | End: 2020-09-03 | Stop reason: HOSPADM

## 2020-09-03 RX ADMIN — SODIUM CHLORIDE: 0.9 INJECTION, SOLUTION INTRAVENOUS at 02:09

## 2020-09-03 RX ADMIN — IRON SUCROSE 200 MG: 20 INJECTION, SOLUTION INTRAVENOUS at 02:09

## 2020-09-03 NOTE — PROGRESS NOTES
Patient arrived for Venofer infusion 3/5.  Patient observed for 30 minutes post infusion. No signs/symptoms of reaction observed.  Patient tolerated infusion well and left in NAD.

## 2020-09-08 ENCOUNTER — INFUSION (OUTPATIENT)
Dept: INFECTIOUS DISEASES | Facility: HOSPITAL | Age: 25
End: 2020-09-08
Attending: INTERNAL MEDICINE
Payer: COMMERCIAL

## 2020-09-08 VITALS
BODY MASS INDEX: 39.46 KG/M2 | SYSTOLIC BLOOD PRESSURE: 167 MMHG | TEMPERATURE: 99 F | OXYGEN SATURATION: 100 % | HEART RATE: 90 BPM | RESPIRATION RATE: 18 BRPM | HEIGHT: 64 IN | DIASTOLIC BLOOD PRESSURE: 87 MMHG | WEIGHT: 231.13 LBS

## 2020-09-08 DIAGNOSIS — N18.30 CHRONIC KIDNEY DISEASE (CKD), STAGE III (MODERATE): ICD-10-CM

## 2020-09-08 DIAGNOSIS — D50.8 OTHER IRON DEFICIENCY ANEMIA: Primary | ICD-10-CM

## 2020-09-08 DIAGNOSIS — O99.012 ANEMIA DURING PREGNANCY IN SECOND TRIMESTER: ICD-10-CM

## 2020-09-08 LAB — FRUCTOSAMINE SERPL-SCNC: 181 UMOL /L (ref 151–300)

## 2020-09-08 PROCEDURE — 25000003 PHARM REV CODE 250: Performed by: INTERNAL MEDICINE

## 2020-09-08 PROCEDURE — 96365 THER/PROPH/DIAG IV INF INIT: CPT

## 2020-09-08 PROCEDURE — 63600175 PHARM REV CODE 636 W HCPCS: Performed by: INTERNAL MEDICINE

## 2020-09-08 RX ORDER — SODIUM CHLORIDE 0.9 % (FLUSH) 0.9 %
10 SYRINGE (ML) INJECTION
Status: DISCONTINUED | OUTPATIENT
Start: 2020-09-08 | End: 2020-09-08 | Stop reason: HOSPADM

## 2020-09-08 RX ORDER — SODIUM CHLORIDE 0.9 % (FLUSH) 0.9 %
10 SYRINGE (ML) INJECTION
Status: CANCELLED | OUTPATIENT
Start: 2020-09-09

## 2020-09-08 RX ORDER — HEPARIN 100 UNIT/ML
500 SYRINGE INTRAVENOUS
Status: CANCELLED | OUTPATIENT
Start: 2020-09-09

## 2020-09-08 RX ORDER — HEPARIN 100 UNIT/ML
500 SYRINGE INTRAVENOUS
Status: DISCONTINUED | OUTPATIENT
Start: 2020-09-08 | End: 2020-09-08 | Stop reason: HOSPADM

## 2020-09-08 RX ORDER — EPINEPHRINE 0.3 MG/.3ML
0.3 INJECTION SUBCUTANEOUS ONCE AS NEEDED
Status: DISCONTINUED | OUTPATIENT
Start: 2020-09-08 | End: 2020-09-08 | Stop reason: HOSPADM

## 2020-09-08 RX ORDER — EPINEPHRINE 0.3 MG/.3ML
0.3 INJECTION SUBCUTANEOUS ONCE AS NEEDED
Status: CANCELLED | OUTPATIENT
Start: 2020-09-09

## 2020-09-08 RX ORDER — METHYLPREDNISOLONE SOD SUCC 125 MG
125 VIAL (EA) INJECTION ONCE AS NEEDED
Status: DISCONTINUED | OUTPATIENT
Start: 2020-09-08 | End: 2020-09-08 | Stop reason: HOSPADM

## 2020-09-08 RX ORDER — DIPHENHYDRAMINE HYDROCHLORIDE 50 MG/ML
50 INJECTION INTRAMUSCULAR; INTRAVENOUS ONCE AS NEEDED
Status: CANCELLED | OUTPATIENT
Start: 2020-09-09

## 2020-09-08 RX ORDER — DIPHENHYDRAMINE HYDROCHLORIDE 50 MG/ML
50 INJECTION INTRAMUSCULAR; INTRAVENOUS ONCE AS NEEDED
Status: DISCONTINUED | OUTPATIENT
Start: 2020-09-08 | End: 2020-09-08 | Stop reason: HOSPADM

## 2020-09-08 RX ORDER — METHYLPREDNISOLONE SOD SUCC 125 MG
125 VIAL (EA) INJECTION ONCE AS NEEDED
Status: CANCELLED | OUTPATIENT
Start: 2020-09-09

## 2020-09-08 RX ADMIN — SODIUM CHLORIDE: 0.9 INJECTION, SOLUTION INTRAVENOUS at 02:09

## 2020-09-08 RX ADMIN — SODIUM CHLORIDE 200 MG: 9 INJECTION, SOLUTION INTRAVENOUS at 03:09

## 2020-09-08 NOTE — PROGRESS NOTES
Patient arrived for Venofer infusion 4/5.  Patient observed for 30 minutes post infusion. No signs/symptoms of reaction observed.  Patient tolerated infusion well and left in NAD.

## 2020-09-11 ENCOUNTER — OFFICE VISIT (OUTPATIENT)
Dept: NEPHROLOGY | Facility: CLINIC | Age: 25
End: 2020-09-11
Payer: COMMERCIAL

## 2020-09-11 ENCOUNTER — TELEPHONE (OUTPATIENT)
Dept: INFECTIOUS DISEASES | Facility: HOSPITAL | Age: 25
End: 2020-09-11

## 2020-09-11 VITALS
BODY MASS INDEX: 25.52 KG/M2 | OXYGEN SATURATION: 99 % | WEIGHT: 149.5 LBS | HEART RATE: 96 BPM | SYSTOLIC BLOOD PRESSURE: 118 MMHG | HEIGHT: 64 IN | DIASTOLIC BLOOD PRESSURE: 82 MMHG

## 2020-09-11 DIAGNOSIS — N18.30 CHRONIC KIDNEY DISEASE (CKD), STAGE III (MODERATE): Primary | ICD-10-CM

## 2020-09-11 PROCEDURE — 99999 PR PBB SHADOW E&M-EST. PATIENT-LVL IV: CPT | Mod: PBBFAC,,, | Performed by: INTERNAL MEDICINE

## 2020-09-11 PROCEDURE — 99999 PR PBB SHADOW E&M-EST. PATIENT-LVL IV: ICD-10-PCS | Mod: PBBFAC,,, | Performed by: INTERNAL MEDICINE

## 2020-09-11 PROCEDURE — 99214 PR OFFICE/OUTPT VISIT, EST, LEVL IV, 30-39 MIN: ICD-10-PCS | Mod: S$GLB,,, | Performed by: INTERNAL MEDICINE

## 2020-09-11 PROCEDURE — 99214 OFFICE O/P EST MOD 30 MIN: CPT | Mod: S$GLB,,, | Performed by: INTERNAL MEDICINE

## 2020-09-11 NOTE — PROGRESS NOTES
REASON FOR FOLLOW UP/CHIEF COMPLAIN: Chronic Kidney disease in pregnancy    REFERRING PHYSICIAN: Primary Doctor No    HISTORY OF PRESENT ILLNESS: 25 y.o. female   has a past medical history of Anemia, Chronic kidney disease, Diabetes mellitus, Hypertension, Nephrotic syndrome, and Restrictive lung disease. patient was referred here for abnormal renal function. She has had DM 1 since age 8, HTN since 10 year ago, started taking medications for HTN since 2018. No chronic NSAID use, no known exposure to lithium, lead.    Denied any issues with urination including dysuria, hematuria, urgency, hesitancy, nocturia, incomplete voiding. Denied chest pain, nausea, vomiting, abd pain, nausea, vomiting, diarrhea, shortness of breath, Orthopnea, PND. Has some pedal edema which is improved compare to her previous clinic visit.  Home Blood pressures are checked intermittently and reports that the systolics are around 120-130 mmhg, no log brought.      ROS:  General: negative for chills, or fatigue  ENT: No epistaxis or headaches  Hematological and Lymphatic: No bleeding problems or blood clots.  Endocrine: No skin changes or temperature intolerance  Respiratory: No cough, shortness of breath, or wheezing  Cardiovascular: No chest pain or dyspnea   Gastrointestinal: No abdominal pain, change in bowel habits  Genito-Urinary: No dysuria, trouble voiding, or hematuria  Musculoskeletal: ROS: negative for - joint pain, joint stiffness, joint swelling, muscle pain or muscular weakness  Neurological: No new focal weakness, no numbness  Dermatological: No rash or ulcers.    PAST MEDICAL HISTORY:  Past Medical History:   Diagnosis Date    Anemia     Chronic kidney disease     Diabetes mellitus     Hypertension     Nephrotic syndrome     Restrictive lung disease        PAST SURGICAL HISTORY:  No past surgical history on file.    FAMILY HISTORY:   Family History   Problem Relation Age of Onset    Heart disease Father     Diabetes  Brother        SOCIAL HISTORY:  Social History     Socioeconomic History    Marital status: Single     Spouse name: Not on file    Number of children: Not on file    Years of education: Not on file    Highest education level: Not on file   Occupational History    Occupation:  at [employer]   Social Needs    Financial resource strain: Not on file    Food insecurity     Worry: Not on file     Inability: Not on file    Transportation needs     Medical: Not on file     Non-medical: Not on file   Tobacco Use    Smoking status: Never Smoker    Smokeless tobacco: Never Used   Substance and Sexual Activity    Alcohol use: No    Drug use: No    Sexual activity: Yes     Partners: Male     Comment: monogamous   Lifestyle    Physical activity     Days per week: Not on file     Minutes per session: Not on file    Stress: Not on file   Relationships    Social connections     Talks on phone: Not on file     Gets together: Not on file     Attends [employer] service: Not on file     Active member of club or organization: Not on file     Attends meetings of clubs or organizations: Not on file     Relationship status: Not on file   Other Topics Concern    Not on file   Social History Narrative    Not on file       ALLERGIES:  Review of patient's allergies indicates:  No Known Allergies    MEDICATIONS:    Current Outpatient Medications:     blood sugar diagnostic Strp, To check BG 4 - 6 times daily, to use with insurance preferred meter, Disp: 150 each, Rfl: 11    blood-glucose meter kit, To check BG 4 times daily, to use with insurance preferred meter, Disp: 1 each, Rfl: 1    carvediloL (COREG) 12.5 MG tablet, Take 1 tablet (12.5 mg total) by mouth 2 (two) times daily., Disp: 60 tablet, Rfl: 11    citalopram (CELEXA) 20 MG tablet, Take 1 tablet (20 mg total) by mouth once daily., Disp: 90 tablet, Rfl: 0    enoxaparin (LOVENOX) 40 mg/0.4 mL Syrg, Inject 0.4 mLs (40 mg total) into the skin once daily., Disp:  "100 mL, Rfl: 3    ergocalciferol (ERGOCALCIFEROL) 50,000 unit Cap, Take 1 capsule (50,000 Units total) by mouth every 7 days., Disp: 12 capsule, Rfl: 1    ferrous sulfate 325 (65 FE) MG EC tablet, Take 1 tablet (325 mg total) by mouth 2 (two) times daily., Disp: 180 tablet, Rfl: 2    flash glucose scanning reader (FREESTYLE MARCY 14 DAY READER) Misc, 1 each by Misc.(Non-Drug; Combo Route) route once daily., Disp: 1 each, Rfl: 0    flash glucose sensor (FREESTYLE MARCY 14 DAY SENSOR) Kit, Route 1 every fourteen (fourteen ) days., Disp: 2 kit, Rfl: 11    fluocinolone and shower cap (DERMA-SMOOTHE/FS SCALP OIL) 0.01 % Oil, Apply oil to damp scalp nightly and cover with shower cap., Disp: 1 Bottle, Rfl: 3    hydroCHLOROthiazide (HYDRODIURIL) 25 MG tablet, , Disp: , Rfl:     insulin (LANTUS SOLOSTAR U-100 INSULIN) glargine 100 units/mL (3mL) SubQ pen, Inject 20 Units into the skin every evening, Disp: 15 mL, Rfl: 6    insulin lispro (HUMALOG KWIKPEN INSULIN) 100 unit/mL pen, Inject 10 units into skin the skin 3 three times daily with meals, Disp: 15 mL, Rfl: 6    iron,carbon,gluc-FA-B12-C-dss (FERRALET 90 DUAL/CITRANATAL BLOOM) 90-1-12-50 mg-mg-mcg-mg Tab, Take 1 tablet by mouth once daily., Disp: 30 tablet, Rfl: 6    ketoconazole (NIZORAL) 2 % shampoo, Wash hair with medicated shampoo at least 2x/week - let sit on scalp at least 5 minutes prior to rinsing, Disp: 120 mL, Rfl: 5    lancets Misc, To check BG 4 - 6 times daily, to use with insurance preferred meter, Disp: 150 each, Rfl: 11    NIFEdipine (PROCARDIA-XL) 30 MG (OSM) 24 hr tablet, Take 1 tablet (30 mg total) by mouth once daily., Disp: 30 tablet, Rfl: 11    norethindrone (ORTHO MICRONOR) 0.35 mg tablet, Take 1 tablet (0.35 mg total) by mouth once daily., Disp: 90 tablet, Rfl: 3    pen needle, diabetic 32 gauge x 5/32" Ndle, For three times daily injection, Disp: 100 each, Rfl: 11   Medication List with Changes/Refills   Current Medications    " BLOOD SUGAR DIAGNOSTIC STRP    To check BG 4 - 6 times daily, to use with insurance preferred meter    BLOOD-GLUCOSE METER KIT    To check BG 4 times daily, to use with insurance preferred meter    CARVEDILOL (COREG) 12.5 MG TABLET    Take 1 tablet (12.5 mg total) by mouth 2 (two) times daily.    CITALOPRAM (CELEXA) 20 MG TABLET    Take 1 tablet (20 mg total) by mouth once daily.    ENOXAPARIN (LOVENOX) 40 MG/0.4 ML SYRG    Inject 0.4 mLs (40 mg total) into the skin once daily.    ERGOCALCIFEROL (ERGOCALCIFEROL) 50,000 UNIT CAP    Take 1 capsule (50,000 Units total) by mouth every 7 days.    FERROUS SULFATE 325 (65 FE) MG EC TABLET    Take 1 tablet (325 mg total) by mouth 2 (two) times daily.    FLASH GLUCOSE SCANNING READER (FREESTYLE MARCY 14 DAY READER) MISC    1 each by Misc.(Non-Drug; Combo Route) route once daily.    FLASH GLUCOSE SENSOR (FREESTYLE MARCY 14 DAY SENSOR) KIT    Route 1 every fourteen (fourteen ) days.    FLUOCINOLONE AND SHOWER CAP (DERMA-SMOOTHE/FS SCALP OIL) 0.01 % OIL    Apply oil to damp scalp nightly and cover with shower cap.    HYDROCHLOROTHIAZIDE (HYDRODIURIL) 25 MG TABLET        INSULIN (LANTUS SOLOSTAR U-100 INSULIN) GLARGINE 100 UNITS/ML (3ML) SUBQ PEN    Inject 20 Units into the skin every evening    INSULIN LISPRO (HUMALOG KWIKPEN INSULIN) 100 UNIT/ML PEN    Inject 10 units into skin the skin 3 three times daily with meals    IRON,CARBON,GLUC-FA-B12-C-DSS (FERRALET 90 DUAL/CITRANATAL BLOOM) 90-1-12-50 MG-MG-MCG-MG TAB    Take 1 tablet by mouth once daily.    KETOCONAZOLE (NIZORAL) 2 % SHAMPOO    Wash hair with medicated shampoo at least 2x/week - let sit on scalp at least 5 minutes prior to rinsing    LANCETS MISC    To check BG 4 - 6 times daily, to use with insurance preferred meter    NIFEDIPINE (PROCARDIA-XL) 30 MG (OSM) 24 HR TABLET    Take 1 tablet (30 mg total) by mouth once daily.    NORETHINDRONE (ORTHO MICRONOR) 0.35 MG TABLET    Take 1 tablet (0.35 mg total) by mouth once  "daily.    PEN NEEDLE, DIABETIC 32 GAUGE X 5/32" NDLE    For three times daily injection   Discontinued Medications    ALBUTEROL (VENTOLIN HFA) 90 MCG/ACTUATION INHALER    Inhale into the lungs.        PHYSICAL EXAM:  /82   Pulse 96   Ht 5' 4" (1.626 m)   Wt 67.8 kg (149 lb 7.6 oz)   LMP  (LMP Unknown) Comment: pt. states last cycle was in May. She is not sure of the exact date.  SpO2 99%   BMI 25.66 kg/m²     General: No distress, No fever or chills  Head: Normocephalic,atraumatic  Eyes: conjunctivae/corneas clear. PERRL, EOM's intact.  Nose: Nares normal. Mucosa normal. No drainage or sinus tenderness.  Neck: No adenopathy,no carotid bruit,no JVD  Lungs:Clear to auscultation bilaterally, No Crackles  Heart: Regular rate and rhythm, no murmur, gallops or rubs  Abdomen: Soft, no tenderness, bowel sounds normal  Extremities: Atraumatic, improved edema around ankles  Skin: Turgor normal. No rashes or ulcers  Neurologic: No focal weakness, oriented.  Dialysis Access: Non applicable         LABS:  Lab Results   Component Value Date    HGB 7.2 (L) 08/28/2020        Lab Results   Component Value Date    CREATININE 1.8 (H) 08/28/2020       Prot/Creat Ratio, Ur   Date Value Ref Range Status   08/28/2020 9.40 (H) 0.00 - 0.20 Final   07/29/2020 7.88 (H) 0.00 - 0.20 Final       Lab Results   Component Value Date     08/28/2020    K 4.4 08/28/2020    CO2 23 08/28/2020       last PTH   Lab Results   Component Value Date    .1 (H) 08/05/2020    CALCIUM 8.1 (L) 08/28/2020    PHOS 4.5 08/28/2020       Lab Results   Component Value Date    HGBA1C 10.7 (H) 09/02/2020       Lab Results   Component Value Date    LDLCALC 171.2 (H) 02/25/2019         Creatinine, Random Ur   Date Value Ref Range Status   08/28/2020 53.0 15.0 - 325.0 mg/dL Final     Comment:     The random urine reference ranges provided were established   for 24 hour urine collections.  No reference ranges exist for  random urine specimens.  " Correlate clinically.     07/29/2020 58.0 15.0 - 325.0 mg/dL Final     Comment:     The random urine reference ranges provided were established   for 24 hour urine collections.  No reference ranges exist for  random urine specimens.  Correlate clinically.     02/25/2019 122.0 15.0 - 325.0 mg/dL Final     Comment:     The random urine reference ranges provided were established   for 24 hour urine collections.  No reference ranges exist for  random urine specimens.  Correlate clinically.         Protein, Urine   Date Value Ref Range Status   08/05/2020 503 (H) 0 - 15 mg/dL Final     Protein, Urine Random   Date Value Ref Range Status   08/28/2020 498 (H) 0 - 15 mg/dL Final     Comment:     The random urine reference ranges provided were established   for 24 hour urine collections.  No reference ranges exist for  random urine specimens.  Correlate clinically.     07/29/2020 457 (H) 0 - 15 mg/dL Final     Comment:     The random urine reference ranges provided were established   for 24 hour urine collections.  No reference ranges exist for  random urine specimens.  Correlate clinically.         Prot/Creat Ratio, Ur   Date Value Ref Range Status   08/28/2020 9.40 (H) 0.00 - 0.20 Final   07/29/2020 7.88 (H) 0.00 - 0.20 Final         ASSESSMENT:  1) Chronic Kidney Disease stage 3  2) Nephrotic Range Proteinuria  3) Hypoalbuminemia  4) Hyperkalemia - improved  5) 15 weeks pregnant   Patients creatinine has been 1.8 mg/dl in 2019 prior to her pregnancy. Since her pregnancy started it has been around 2.0 mg/dl. Her BUN is between 32-38. I worry that she may need dialysis eventually to keep her BUN below 50 during this pregnancy.    She also has significant proteinuria. Other than blood pressure control and diuretic don't have much to offer. She has been on Lovenox for risk of thrombosis due to low albumin. Since she is in second trimester now should be safer to switch to aspirin. Will discuss with MFM. Discussed with patient  regarding advantages and disadvantages of switching Lovenox to aspirin.   Reinforced low potassium diet. Acid-base in acceptable range. Continue Vitamin D.  - Repeat CBC, Basic metabolic panel and urine stuides    6) Uncontrolled HTN   She is now on Coreg 12.5 mg BID, HCTZ 25 mg daily and Procardia 30 mg daily. Blood pressure today is much better, informed patient to log her blood pressures and bring log to her next appointment to MFM to see if we should back off on some of her medications.    7) Anemia  Patient received four doses of IV Iron 200 mg, she has one more dose to go.    8) Vitamin D deficiency  Continue Ergocalciferol    9) DM  Being followed by endocrine.    Very high risk for mother and baby. Patients mother came to the appointment today explained her about the complexicity of her care, potential need for dialysis in future and the high risk to baby and mother.     RTC with MFM in 2 weeks, see me 2 weeks after MFM appointment.    Diagnosis and plan of care explained to the patient and mother.  Both Verbalized understanding. Answered all questions.  Thanks for allowing me to participate in the care of this patient.     3:59 PM    Oswald Kruger MD  Nephrology & Critical Care

## 2020-09-14 ENCOUNTER — INFUSION (OUTPATIENT)
Dept: INFECTIOUS DISEASES | Facility: HOSPITAL | Age: 25
End: 2020-09-14
Attending: INTERNAL MEDICINE
Payer: COMMERCIAL

## 2020-09-14 VITALS
RESPIRATION RATE: 18 BRPM | OXYGEN SATURATION: 100 % | WEIGHT: 144.38 LBS | DIASTOLIC BLOOD PRESSURE: 81 MMHG | HEIGHT: 64 IN | HEART RATE: 99 BPM | BODY MASS INDEX: 24.65 KG/M2 | SYSTOLIC BLOOD PRESSURE: 141 MMHG | TEMPERATURE: 99 F

## 2020-09-14 DIAGNOSIS — N18.30 CHRONIC KIDNEY DISEASE (CKD), STAGE III (MODERATE): ICD-10-CM

## 2020-09-14 DIAGNOSIS — O99.012 ANEMIA DURING PREGNANCY IN SECOND TRIMESTER: ICD-10-CM

## 2020-09-14 DIAGNOSIS — D50.8 OTHER IRON DEFICIENCY ANEMIA: Primary | ICD-10-CM

## 2020-09-14 PROCEDURE — 25000003 PHARM REV CODE 250: Performed by: INTERNAL MEDICINE

## 2020-09-14 PROCEDURE — 96365 THER/PROPH/DIAG IV INF INIT: CPT

## 2020-09-14 PROCEDURE — 63600175 PHARM REV CODE 636 W HCPCS: Performed by: INTERNAL MEDICINE

## 2020-09-14 RX ORDER — DIPHENHYDRAMINE HYDROCHLORIDE 50 MG/ML
50 INJECTION INTRAMUSCULAR; INTRAVENOUS ONCE AS NEEDED
Status: CANCELLED | OUTPATIENT
Start: 2020-09-17

## 2020-09-14 RX ORDER — EPINEPHRINE 0.3 MG/.3ML
0.3 INJECTION SUBCUTANEOUS ONCE AS NEEDED
Status: DISCONTINUED | OUTPATIENT
Start: 2020-09-14 | End: 2020-09-14 | Stop reason: HOSPADM

## 2020-09-14 RX ORDER — SODIUM CHLORIDE 0.9 % (FLUSH) 0.9 %
10 SYRINGE (ML) INJECTION
Status: CANCELLED | OUTPATIENT
Start: 2020-09-17

## 2020-09-14 RX ORDER — SODIUM CHLORIDE 0.9 % (FLUSH) 0.9 %
10 SYRINGE (ML) INJECTION
Status: DISCONTINUED | OUTPATIENT
Start: 2020-09-14 | End: 2020-09-14 | Stop reason: HOSPADM

## 2020-09-14 RX ORDER — HEPARIN 100 UNIT/ML
500 SYRINGE INTRAVENOUS
Status: CANCELLED | OUTPATIENT
Start: 2020-09-17

## 2020-09-14 RX ORDER — METHYLPREDNISOLONE SOD SUCC 125 MG
125 VIAL (EA) INJECTION ONCE AS NEEDED
Status: DISCONTINUED | OUTPATIENT
Start: 2020-09-14 | End: 2020-09-14 | Stop reason: HOSPADM

## 2020-09-14 RX ORDER — DIPHENHYDRAMINE HYDROCHLORIDE 50 MG/ML
50 INJECTION INTRAMUSCULAR; INTRAVENOUS ONCE AS NEEDED
Status: DISCONTINUED | OUTPATIENT
Start: 2020-09-14 | End: 2020-09-14 | Stop reason: HOSPADM

## 2020-09-14 RX ORDER — METHYLPREDNISOLONE SOD SUCC 125 MG
125 VIAL (EA) INJECTION ONCE AS NEEDED
Status: CANCELLED | OUTPATIENT
Start: 2020-09-17

## 2020-09-14 RX ORDER — EPINEPHRINE 0.3 MG/.3ML
0.3 INJECTION SUBCUTANEOUS ONCE AS NEEDED
Status: CANCELLED | OUTPATIENT
Start: 2020-09-17

## 2020-09-14 RX ORDER — HEPARIN 100 UNIT/ML
500 SYRINGE INTRAVENOUS
Status: DISCONTINUED | OUTPATIENT
Start: 2020-09-14 | End: 2020-09-14 | Stop reason: HOSPADM

## 2020-09-14 RX ADMIN — SODIUM CHLORIDE: 9 INJECTION, SOLUTION INTRAVENOUS at 01:09

## 2020-09-14 RX ADMIN — SODIUM CHLORIDE 200 MG: 9 INJECTION, SOLUTION INTRAVENOUS at 01:09

## 2020-09-14 NOTE — PROGRESS NOTES
Patient arrived for Venofer infusion 5/5.  Patient observed for 30 minutes post infusion. No signs/symptoms of reaction observed.  Patient tolerated infusion well and left in NAD.

## 2020-09-15 ENCOUNTER — ROUTINE PRENATAL (OUTPATIENT)
Dept: OBSTETRICS AND GYNECOLOGY | Facility: CLINIC | Age: 25
End: 2020-09-15
Payer: COMMERCIAL

## 2020-09-15 VITALS — SYSTOLIC BLOOD PRESSURE: 120 MMHG | WEIGHT: 167 LBS | DIASTOLIC BLOOD PRESSURE: 84 MMHG | BODY MASS INDEX: 28.67 KG/M2

## 2020-09-15 DIAGNOSIS — Z3A.15 15 WEEKS GESTATION OF PREGNANCY: Primary | ICD-10-CM

## 2020-09-15 LAB
BACTERIA #/AREA URNS AUTO: ABNORMAL /HPF
BILIRUB UR QL STRIP: NEGATIVE
CLARITY UR REFRACT.AUTO: ABNORMAL
COLOR UR AUTO: YELLOW
GLUCOSE UR QL STRIP: ABNORMAL
HGB UR QL STRIP: NEGATIVE
HYALINE CASTS UR QL AUTO: 0 /LPF
KETONES UR QL STRIP: NEGATIVE
LEUKOCYTE ESTERASE UR QL STRIP: ABNORMAL
MICROSCOPIC COMMENT: ABNORMAL
NITRITE UR QL STRIP: NEGATIVE
PH UR STRIP: 6 [PH] (ref 5–8)
PROT UR QL STRIP: ABNORMAL
RBC #/AREA URNS AUTO: 3 /HPF (ref 0–4)
SP GR UR STRIP: 1.01 (ref 1–1.03)
SQUAMOUS #/AREA URNS AUTO: 12 /HPF
URN SPEC COLLECT METH UR: ABNORMAL
WBC #/AREA URNS AUTO: 23 /HPF (ref 0–5)

## 2020-09-15 PROCEDURE — 0502F SUBSEQUENT PRENATAL CARE: CPT | Mod: S$GLB,,, | Performed by: OBSTETRICS & GYNECOLOGY

## 2020-09-15 PROCEDURE — 81001 URINALYSIS AUTO W/SCOPE: CPT

## 2020-09-15 PROCEDURE — 99999 PR PBB SHADOW E&M-EST. PATIENT-LVL III: CPT | Mod: PBBFAC,,, | Performed by: OBSTETRICS & GYNECOLOGY

## 2020-09-15 PROCEDURE — 87086 URINE CULTURE/COLONY COUNT: CPT

## 2020-09-15 PROCEDURE — 0502F PR SUBSEQUENT PRENATAL CARE: ICD-10-PCS | Mod: S$GLB,,, | Performed by: OBSTETRICS & GYNECOLOGY

## 2020-09-15 PROCEDURE — 99999 PR PBB SHADOW E&M-EST. PATIENT-LVL III: ICD-10-PCS | Mod: PBBFAC,,, | Performed by: OBSTETRICS & GYNECOLOGY

## 2020-09-15 NOTE — PROGRESS NOTES
Doing well. No OB complaints. Patient states she had [id number] done, doing gender reveal this week.  Compliant with lovenox injections.  Finished iron infusions.  Connected MOM orders placed.  Flu shot recommended.  See me in 4 weeks. Will transfer care to another provider at 24 weeks.    Marion Jeffrey MD  Obstetrics and Gynecology  [facility]

## 2020-09-16 ENCOUNTER — TELEPHONE (OUTPATIENT)
Dept: HEPATOLOGY | Facility: HOSPITAL | Age: 25
End: 2020-09-16

## 2020-09-16 LAB
BACTERIA UR CULT: NORMAL
BACTERIA UR CULT: NORMAL

## 2020-09-16 RX ORDER — HYDROCHLOROTHIAZIDE 25 MG/1
25 TABLET ORAL DAILY
Qty: 30 TABLET | Refills: 3 | Status: ON HOLD | OUTPATIENT
Start: 2020-09-16 | End: 2020-11-11 | Stop reason: HOSPADM

## 2020-09-16 RX ORDER — HYDROCHLOROTHIAZIDE 25 MG/1
TABLET ORAL
Status: CANCELLED
Start: 2020-09-16

## 2020-09-17 ENCOUNTER — LAB VISIT (OUTPATIENT)
Dept: LAB | Facility: HOSPITAL | Age: 25
End: 2020-09-17
Attending: INTERNAL MEDICINE
Payer: COMMERCIAL

## 2020-09-17 ENCOUNTER — TELEPHONE (OUTPATIENT)
Dept: NEPHROLOGY | Facility: CLINIC | Age: 25
End: 2020-09-17

## 2020-09-17 DIAGNOSIS — N18.30 CHRONIC KIDNEY DISEASE (CKD), STAGE III (MODERATE): ICD-10-CM

## 2020-09-17 LAB
ALBUMIN SERPL BCP-MCNC: 1.8 G/DL (ref 3.5–5.2)
ANION GAP SERPL CALC-SCNC: 7 MMOL/L (ref 8–16)
BASOPHILS # BLD AUTO: 0.04 K/UL (ref 0–0.2)
BASOPHILS NFR BLD: 0.5 % (ref 0–1.9)
BUN SERPL-MCNC: 28 MG/DL (ref 6–20)
CALCIUM SERPL-MCNC: 8.2 MG/DL (ref 8.7–10.5)
CHLORIDE SERPL-SCNC: 106 MMOL/L (ref 95–110)
CO2 SERPL-SCNC: 19 MMOL/L (ref 23–29)
CREAT SERPL-MCNC: 2.3 MG/DL (ref 0.5–1.4)
CREAT UR-MCNC: 72 MG/DL (ref 15–325)
DIFFERENTIAL METHOD: ABNORMAL
EOSINOPHIL # BLD AUTO: 0.1 K/UL (ref 0–0.5)
EOSINOPHIL NFR BLD: 1.1 % (ref 0–8)
ERYTHROCYTE [DISTWIDTH] IN BLOOD BY AUTOMATED COUNT: 12.9 % (ref 11.5–14.5)
EST. GFR  (AFRICAN AMERICAN): 33.1 ML/MIN/1.73 M^2
EST. GFR  (NON AFRICAN AMERICAN): 28.7 ML/MIN/1.73 M^2
GLUCOSE SERPL-MCNC: 286 MG/DL (ref 70–110)
HCT VFR BLD AUTO: 27.7 % (ref 37–48.5)
HGB BLD-MCNC: 8.9 G/DL (ref 12–16)
IMM GRANULOCYTES # BLD AUTO: 0.03 K/UL (ref 0–0.04)
IMM GRANULOCYTES NFR BLD AUTO: 0.4 % (ref 0–0.5)
LYMPHOCYTES # BLD AUTO: 2.6 K/UL (ref 1–4.8)
LYMPHOCYTES NFR BLD: 34.8 % (ref 18–48)
MCH RBC QN AUTO: 29.2 PG (ref 27–31)
MCHC RBC AUTO-ENTMCNC: 32.1 G/DL (ref 32–36)
MCV RBC AUTO: 91 FL (ref 82–98)
MONOCYTES # BLD AUTO: 0.5 K/UL (ref 0.3–1)
MONOCYTES NFR BLD: 6.3 % (ref 4–15)
NEUTROPHILS # BLD AUTO: 4.2 K/UL (ref 1.8–7.7)
NEUTROPHILS NFR BLD: 56.9 % (ref 38–73)
NRBC BLD-RTO: 0 /100 WBC
PHOSPHATE SERPL-MCNC: 4 MG/DL (ref 2.7–4.5)
PLATELET # BLD AUTO: 363 K/UL (ref 150–350)
PMV BLD AUTO: 10.8 FL (ref 9.2–12.9)
POTASSIUM SERPL-SCNC: 4.3 MMOL/L (ref 3.5–5.1)
PROT UR-MCNC: 859 MG/DL (ref 0–15)
PROT/CREAT UR: 11.93 MG/G{CREAT} (ref 0–0.2)
RBC # BLD AUTO: 3.05 M/UL (ref 4–5.4)
SODIUM SERPL-SCNC: 132 MMOL/L (ref 136–145)
WBC # BLD AUTO: 7.32 K/UL (ref 3.9–12.7)

## 2020-09-17 PROCEDURE — 85025 COMPLETE CBC W/AUTO DIFF WBC: CPT

## 2020-09-17 PROCEDURE — 80069 RENAL FUNCTION PANEL: CPT

## 2020-09-17 PROCEDURE — 36415 COLL VENOUS BLD VENIPUNCTURE: CPT

## 2020-09-17 PROCEDURE — 82570 ASSAY OF URINE CREATININE: CPT

## 2020-09-17 NOTE — TELEPHONE ENCOUNTER
Trying to reach patient to get some labs done to monitor her hemoglobin and creatinine. Called both numbers no answer, left a message.

## 2020-09-21 DIAGNOSIS — E87.20 METABOLIC ACIDOSIS: Primary | ICD-10-CM

## 2020-09-21 RX ORDER — SODIUM BICARBONATE 650 MG/1
650 TABLET ORAL 2 TIMES DAILY
Qty: 180 TABLET | Refills: 1 | Status: ON HOLD | COMMUNITY
Start: 2020-09-21 | End: 2020-11-11 | Stop reason: HOSPADM

## 2020-09-21 NOTE — PROGRESS NOTES
Called patient, informed her about the improved hemoglobin, fluctuating creatinine, metabolic acidosis and worsened proteinuria. She informed me that she had an ultrasound and it is a girl, very happy for her. Prescribed her sodium bicarbonate twice a day. Her mothers number is [phone].

## 2020-09-22 ENCOUNTER — PATIENT MESSAGE (OUTPATIENT)
Dept: ENDOCRINOLOGY | Facility: CLINIC | Age: 25
End: 2020-09-22

## 2020-09-22 ENCOUNTER — OFFICE VISIT (OUTPATIENT)
Dept: ENDOCRINOLOGY | Facility: CLINIC | Age: 25
End: 2020-09-22
Payer: COMMERCIAL

## 2020-09-22 DIAGNOSIS — E11.649 HYPOGLYCEMIA ASSOCIATED WITH DIABETES: ICD-10-CM

## 2020-09-22 DIAGNOSIS — N18.30 TYPE 1 DIABETES MELLITUS WITH STAGE 3 CHRONIC KIDNEY DISEASE: Primary | ICD-10-CM

## 2020-09-22 DIAGNOSIS — E10.22 TYPE 1 DIABETES MELLITUS WITH STAGE 3 CHRONIC KIDNEY DISEASE: Primary | ICD-10-CM

## 2020-09-22 PROCEDURE — 99214 PR OFFICE/OUTPT VISIT, EST, LEVL IV, 30-39 MIN: ICD-10-PCS | Mod: 95,,, | Performed by: INTERNAL MEDICINE

## 2020-09-22 PROCEDURE — 99214 OFFICE O/P EST MOD 30 MIN: CPT | Mod: 95,,, | Performed by: INTERNAL MEDICINE

## 2020-09-22 NOTE — ASSESSMENT & PLAN NOTE
Most likely secondary to decreased appetite  This has improved, knows to adjust ICHO ratio based on meals.

## 2020-09-22 NOTE — PROGRESS NOTES
Subjective:      Patient ID: Isabela Read is a 25 y.o. female.    Chief Complaint:  No chief complaint on file.      History of Present Illness    Ms. Read is a 25 year old woman for a virtual visit for T1DM, complicated by CKD, hypoglycemia. She is in her 16th week of gestation. missed last week appointment.   At her last visit with me, started using iCHO and isf. Feels blood sugars are better controlled. No ally sensors since 9/1. Has a log - will send to me through [employer].    Has low blood sugars, usually because not eating too much. Appetite has improved and eating a little better so this has resolved/improved.   Denies any difficulties with carb counting/insulin calculation.   Tries to eat very similar things.     Meals: two to three a day  Snacks: two a day, usually fruit. -- uses insulin before all of her snacks. Takes humalog ten minutes before meals.     Reports blood sugars higher after breakfast and lunch.   Current regimen:   Lantus 18 units at night   ICHO 1:9 with meals   ISF 1:35      Met with DM educator, but has not returned.       Review of Systems   Constitutional: Negative for fever.   HENT: Negative for congestion.    Eyes: Negative for visual disturbance.   Respiratory: Negative for shortness of breath.    Cardiovascular: Negative for chest pain.   Gastrointestinal: Negative for abdominal pain.   Genitourinary: Negative for dysuria.   Musculoskeletal: Negative for arthralgias.   Skin: Negative for rash.   Neurological: Negative for weakness.   Hematological: Does not bruise/bleed easily.   Psychiatric/Behavioral: Negative for sleep disturbance.       Objective:   Physical Exam  There were no vitals filed for this visit.    BP Readings from Last 3 Encounters:   09/15/20 120/84   09/14/20 (!) 141/81   09/11/20 118/82     Wt Readings from Last 1 Encounters:   09/15/20 1612 75.8 kg (167 lb)         There is no height or weight on file to calculate BMI.    Lab Review:   Lab Results    Component Value Date    HGBA1C 10.7 (H) 09/02/2020     Lab Results   Component Value Date    CHOL 312 (H) 02/25/2019    HDL 63 02/25/2019    LDLCALC 171.2 (H) 02/25/2019    TRIG 389 (H) 02/25/2019    CHOLHDL 20.2 02/25/2019     Lab Results   Component Value Date     (L) 09/17/2020    K 4.3 09/17/2020     09/17/2020    CO2 19 (L) 09/17/2020     (H) 09/17/2020    BUN 28 (H) 09/17/2020    CREATININE 2.3 (H) 09/17/2020    CALCIUM 8.2 (L) 09/17/2020    PROT 5.8 (L) 08/17/2020    ALBUMIN 1.8 (L) 09/17/2020    BILITOT 0.2 08/17/2020    ALKPHOS 68 08/17/2020    AST 12 08/17/2020    ALT 17 08/17/2020    ANIONGAP 7 (L) 09/17/2020    ESTGFRAFRICA 33.1 (A) 09/17/2020    EGFRNONAA 28.7 (A) 09/17/2020    TSH 3.786 08/04/2020   Results for SIXTO CARNEY (MRN [id number]) as of 9/22/2020 13:10   Ref. Range 9/2/2020 15:09   FRUCTOSAMINE Latest Ref Range: 151 - 300 umol /L 181     Results for SIXTO CARNEY (MRN [id number]) as of 9/22/2020 13:10   Ref. Range 8/17/2020 14:30 8/28/2020 09:29 9/17/2020 14:06   Sodium Latest Ref Range: 136 - 145 mmol/L 132 (L) 136 132 (L)   Potassium Latest Ref Range: 3.5 - 5.1 mmol/L 5.1 4.4 4.3   Chloride Latest Ref Range: 95 - 110 mmol/L 105 108 106   CO2 Latest Ref Range: 23 - 29 mmol/L 21 (L) 23 19 (L)   Anion Gap Latest Ref Range: 8 - 16 mmol/L 6 (L) 5 (L) 7 (L)   BUN, Bld Latest Ref Range: 6 - 20 mg/dL 38 (H) 28 (H) 28 (H)   Creatinine Latest Ref Range: 0.5 - 1.4 mg/dL 2.1 (H) 1.8 (H) 2.3 (H)   eGFR if non African American Latest Ref Range: >60 mL/min/1.73 m^2 32 (A) 38.6 (A) 28.7 (A)   eGFR if African American Latest Ref Range: >60 mL/min/1.73 m^2 37 (A) 44.5 (A) 33.1 (A)   Glucose Latest Ref Range: 70 - 110 mg/dL 316 (H) 127 (H) 286 (H)       Assessment and Plan     Type 1 diabetes mellitus with diabetic chronic kidney disease  Discrepancy between A1C and blood sugar logs.   Hemoglobin electrophoresis was normal and did not identify a variant    Blood sugar log has  improved substantially   Two hour numbers are reasonable     Adjust medication as follows:  Lantus 18 units at bedtime   ICHO   Breakfast 1:9  Lunch 1:8  Dinner 1:9  Continues ISF 1:35    9/30th at 11:30 for a virtual visit.     Hypoglycemia associated with diabetes  Most likely secondary to decreased appetite  This has improved, knows to adjust ICHO ratio based on meals.       The patient location is: car - not [id number]  The chief complaint leading to consultation is: type 1 DM/hypoglycemia    Visit type: audiovisual    Face to Face time with patient: 25, 30 minutes of total time spent on the encounter, which includes face to face time and non-face to face time preparing to see the patient (eg, review of tests), Obtaining and/or reviewing separately obtained history, Documenting clinical information in the electronic or other health record, Independently interpreting results (not separately reported) and communicating results to the patient/family/caregiver, or Care coordination (not separately reported).         Each patient to whom he or she provides medical services by telemedicine is:  (1) informed of the relationship between the physician and patient and the respective role of any other health care provider with respect to management of the patient; and (2) notified that he or she may decline to receive medical services by telemedicine and may withdraw from such care at any time.

## 2020-09-22 NOTE — ASSESSMENT & PLAN NOTE
Discrepancy between A1C and blood sugar logs.   Hemoglobin electrophoresis was normal and did not identify a variant    Blood sugar log has improved substantially   Two hour numbers are reasonable     Adjust medication as follows:  Lantus 18 units at bedtime   ICHO   Breakfast 1:9  Lunch 1:8  Dinner 1:9  Continues ISF 1:35    9/30th at 11:30 for a virtual visit.

## 2020-09-22 NOTE — Clinical Note
Patient does not have ally sensors any more due to change of insurance. Needs to send picture of her blood sugar log so I can review and make adjustments. She is pregnant type 1 with chronic kidney diseas.e

## 2020-09-23 ENCOUNTER — PROCEDURE VISIT (OUTPATIENT)
Dept: MATERNAL FETAL MEDICINE | Facility: CLINIC | Age: 25
End: 2020-09-23
Payer: COMMERCIAL

## 2020-09-23 ENCOUNTER — INITIAL CONSULT (OUTPATIENT)
Dept: MATERNAL FETAL MEDICINE | Facility: CLINIC | Age: 25
End: 2020-09-23
Payer: COMMERCIAL

## 2020-09-23 VITALS
BODY MASS INDEX: 25.58 KG/M2 | DIASTOLIC BLOOD PRESSURE: 98 MMHG | WEIGHT: 149.06 LBS | SYSTOLIC BLOOD PRESSURE: 140 MMHG

## 2020-09-23 DIAGNOSIS — I10 ESSENTIAL HYPERTENSION: Primary | ICD-10-CM

## 2020-09-23 DIAGNOSIS — Z36.89 ENCOUNTER FOR FETAL ANATOMIC SURVEY: Primary | ICD-10-CM

## 2020-09-23 DIAGNOSIS — N28.9 RENAL DISEASE: ICD-10-CM

## 2020-09-23 DIAGNOSIS — D50.8 OTHER IRON DEFICIENCY ANEMIA: ICD-10-CM

## 2020-09-23 DIAGNOSIS — E10.22 TYPE 1 DIABETES MELLITUS WITH DIABETIC CHRONIC KIDNEY DISEASE, UNSPECIFIED CKD STAGE: ICD-10-CM

## 2020-09-23 DIAGNOSIS — N18.30 CHRONIC KIDNEY DISEASE (CKD), STAGE III (MODERATE): ICD-10-CM

## 2020-09-23 DIAGNOSIS — E10.22 TYPE 1 DIABETES MELLITUS WITH STAGE 3 CHRONIC KIDNEY DISEASE: ICD-10-CM

## 2020-09-23 DIAGNOSIS — O99.012 ANEMIA DURING PREGNANCY IN SECOND TRIMESTER: ICD-10-CM

## 2020-09-23 DIAGNOSIS — Z36.89 ENCOUNTER FOR ULTRASOUND TO CHECK FETAL GROWTH: ICD-10-CM

## 2020-09-23 DIAGNOSIS — N18.30 TYPE 1 DIABETES MELLITUS WITH STAGE 3 CHRONIC KIDNEY DISEASE: ICD-10-CM

## 2020-09-23 PROCEDURE — 99214 OFFICE O/P EST MOD 30 MIN: CPT | Mod: S$GLB,,, | Performed by: OBSTETRICS & GYNECOLOGY

## 2020-09-23 PROCEDURE — 99214 PR OFFICE/OUTPT VISIT, EST, LEVL IV, 30-39 MIN: ICD-10-PCS | Mod: S$GLB,,, | Performed by: OBSTETRICS & GYNECOLOGY

## 2020-09-23 PROCEDURE — 99999 PR PBB SHADOW E&M-EST. PATIENT-LVL III: CPT | Mod: PBBFAC,,, | Performed by: OBSTETRICS & GYNECOLOGY

## 2020-09-23 PROCEDURE — 99999 PR PBB SHADOW E&M-EST. PATIENT-LVL III: ICD-10-PCS | Mod: PBBFAC,,, | Performed by: OBSTETRICS & GYNECOLOGY
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
## 2020-09-25 PROBLEM — N18.30 CHRONIC KIDNEY DISEASE (CKD), STAGE III (MODERATE): Status: RESOLVED | Noted: 2019-10-23 | Resolved: 2020-09-25

## 2020-09-29 ENCOUNTER — PATIENT MESSAGE (OUTPATIENT)
Dept: ENDOCRINOLOGY | Facility: CLINIC | Age: 25
End: 2020-09-29

## 2020-09-29 ENCOUNTER — PATIENT MESSAGE (OUTPATIENT)
Dept: OBSTETRICS AND GYNECOLOGY | Facility: CLINIC | Age: 25
End: 2020-09-29

## 2020-09-29 ENCOUNTER — TELEPHONE (OUTPATIENT)
Dept: ENDOCRINOLOGY | Facility: CLINIC | Age: 25
End: 2020-09-29

## 2020-09-30 ENCOUNTER — TELEPHONE (OUTPATIENT)
Dept: OBSTETRICS AND GYNECOLOGY | Facility: CLINIC | Age: 25
End: 2020-09-30

## 2020-09-30 ENCOUNTER — PATIENT MESSAGE (OUTPATIENT)
Dept: OBSTETRICS AND GYNECOLOGY | Facility: CLINIC | Age: 25
End: 2020-09-30

## 2020-09-30 NOTE — TELEPHONE ENCOUNTER
----- Message from Darryl Jeffrey MD sent at 9/29/2020  9:42 PM CDT -----  Hi,  I reviewed her blood pressures in connected mom and they were very high. I tried sending her a message in the patient portal. Do you mind calling her to see if she can come to [employer] walk in on Wednesday for a bp check?  Thanks!  darryl

## 2020-10-02 ENCOUNTER — PATIENT MESSAGE (OUTPATIENT)
Dept: NEPHROLOGY | Facility: CLINIC | Age: 25
End: 2020-10-02

## 2020-10-05 RX ORDER — PROMETHAZINE HYDROCHLORIDE 12.5 MG/1
12.5 TABLET ORAL EVERY 8 HOURS PRN
Qty: 30 TABLET | Refills: 1 | Status: SHIPPED | OUTPATIENT
Start: 2020-10-05 | End: 2020-11-30 | Stop reason: ALTCHOICE

## 2020-10-12 ENCOUNTER — TELEPHONE (OUTPATIENT)
Dept: MATERNAL FETAL MEDICINE | Facility: CLINIC | Age: 25
End: 2020-10-12

## 2020-10-12 DIAGNOSIS — N18.32 TYPE 1 DIABETES MELLITUS WITH STAGE 3B CHRONIC KIDNEY DISEASE: ICD-10-CM

## 2020-10-12 DIAGNOSIS — N28.9 RENAL DISEASE: ICD-10-CM

## 2020-10-12 DIAGNOSIS — N18.4 CKD (CHRONIC KIDNEY DISEASE) STAGE 4, GFR 15-29 ML/MIN: Primary | ICD-10-CM

## 2020-10-12 DIAGNOSIS — E10.22 TYPE 1 DIABETES MELLITUS WITH STAGE 3B CHRONIC KIDNEY DISEASE: ICD-10-CM

## 2020-10-12 DIAGNOSIS — I15.0 RENOVASCULAR HYPERTENSION: Primary | ICD-10-CM

## 2020-10-12 RX ORDER — ASPIRIN 81 MG/1
81 TABLET ORAL NIGHTLY
Qty: 150 TABLET | Refills: 1 | Status: SHIPPED | OUTPATIENT
Start: 2020-10-12 | End: 2021-10-21

## 2020-10-12 NOTE — TELEPHONE ENCOUNTER
Attempted to call patient.  LM on one #.  Attempting to reach in order to discuss starting ASA prophylaxis this pregnancy.  Rx sent pharmacy.   Will discuss on Wednesday at appt if no call prior to this.      ADDENDUM:  Patient called back.   Discussed starting ASA for Pre-E prophylaxis. Patient is to continue on Lovenox as well.  Reports understanding and will start tonight.  Reminded of appointment on Wednesday.      Jose Kerr MD   Maternal-Fetal Medicine

## 2020-10-14 ENCOUNTER — PROCEDURE VISIT (OUTPATIENT)
Dept: MATERNAL FETAL MEDICINE | Facility: CLINIC | Age: 25
End: 2020-10-14
Payer: COMMERCIAL

## 2020-10-14 ENCOUNTER — INITIAL CONSULT (OUTPATIENT)
Dept: MATERNAL FETAL MEDICINE | Facility: CLINIC | Age: 25
End: 2020-10-14
Payer: COMMERCIAL

## 2020-10-14 VITALS
DIASTOLIC BLOOD PRESSURE: 76 MMHG | WEIGHT: 140.44 LBS | BODY MASS INDEX: 24.11 KG/M2 | SYSTOLIC BLOOD PRESSURE: 122 MMHG

## 2020-10-14 DIAGNOSIS — N18.32 TYPE 1 DIABETES MELLITUS WITH STAGE 3B CHRONIC KIDNEY DISEASE: ICD-10-CM

## 2020-10-14 DIAGNOSIS — O99.012 ANEMIA DURING PREGNANCY IN SECOND TRIMESTER: ICD-10-CM

## 2020-10-14 DIAGNOSIS — O36.5920 POOR FETAL GROWTH AFFECTING MANAGEMENT OF MOTHER IN SECOND TRIMESTER, SINGLE OR UNSPECIFIED FETUS: ICD-10-CM

## 2020-10-14 DIAGNOSIS — I15.0 RENOVASCULAR HYPERTENSION: ICD-10-CM

## 2020-10-14 DIAGNOSIS — N28.9 RENAL DISEASE: Primary | ICD-10-CM

## 2020-10-14 DIAGNOSIS — E10.22 TYPE 1 DIABETES MELLITUS WITH STAGE 3B CHRONIC KIDNEY DISEASE: ICD-10-CM

## 2020-10-14 DIAGNOSIS — Z36.89 ENCOUNTER FOR FETAL ANATOMIC SURVEY: ICD-10-CM

## 2020-10-14 PROCEDURE — 99999 PR PBB SHADOW E&M-EST. PATIENT-LVL III: CPT | Mod: PBBFAC,,, | Performed by: OBSTETRICS & GYNECOLOGY

## 2020-10-14 PROCEDURE — 99214 OFFICE O/P EST MOD 30 MIN: CPT | Mod: 25,S$GLB,, | Performed by: OBSTETRICS & GYNECOLOGY

## 2020-10-14 PROCEDURE — 99214 PR OFFICE/OUTPT VISIT, EST, LEVL IV, 30-39 MIN: ICD-10-PCS | Mod: 25,S$GLB,, | Performed by: OBSTETRICS & GYNECOLOGY

## 2020-10-14 PROCEDURE — 99999 PR PBB SHADOW E&M-EST. PATIENT-LVL III: ICD-10-PCS | Mod: PBBFAC,,, | Performed by: OBSTETRICS & GYNECOLOGY

## 2020-10-14 PROCEDURE — 76811 PR US, OB FETAL EVAL & EXAM, TRANSABDOM,FIRST GESTATION: ICD-10-PCS | Mod: S$GLB,,, | Performed by: OBSTETRICS & GYNECOLOGY

## 2020-10-14 PROCEDURE — 76811 OB US DETAILED SNGL FETUS: CPT | Mod: S$GLB,,, | Performed by: OBSTETRICS & GYNECOLOGY

## 2020-10-14 NOTE — PROGRESS NOTES
Maternal Fetal Medicine follow up consult    SUBJECTIVE:     Isabela Read is a 25 y.o.  female with IUP at 19w6d who is seen in follow up consultation by MFM.  Pregnancy complications include: type 1 DM, HTN, CKD stage III.    Previous notes reviewed.   No changes to medical, surgical, family, social, or obstetric history.    Interval history since last [facility] visit:   Patient reports no symptoms today.   The patient reports good compliance with her insulin regimen as well as measuring her blood sugars daily.    Blood sugar log was reviewed (since 10/1):  FASTING  -  11/12 elevated (Range  , averaging in the 110s)  BREAKFAST  -  5/10 elevated (Range )  LUNCH  -  3/12 elevated (Range )  DINNER  -  2/12 elevated (Range )    She has had good follow up with her nephrologist has been compliant on her bicarb and hypertensive meds.  The patient denies any headaches, blurry vision, right upper quadrant pain.  She reports good compliance with her aspirin as well as the Lovenox ( which she takes at night time )    She denies any leakage of fluid, vaginal bleeding, contractions, cramping.    Medications:  Celexa  PNV  HCTZ  Coreg 12.5mg BID  Procardia 30 daily  Lovenox 40 daily  ASA    Insulin  regimen  Lantus 20 units at bedtime   With carb counting/correction at meals    Care team members:  Rachele - Primary OB  Dipp - endocrine  Slick - Nephrologist     OBJECTIVE:     Blood Pressure: 122/76  Weight: 63.7 (4kg weight loss since last visit)    Ultrasound performed. See viewpoint for full ultrasound report.    cfDNA - Low risk    APLAS labs negative    BMP  Lab Results   Component Value Date     (L) 2020    K 4.3 2020     2020    CO2 19 (L) 2020    BUN 28 (H) 2020    CREATININE 2.3 (H) 2020    CALCIUM 8.2 (L) 2020    ANIONGAP 7 (L) 2020    ESTGFRAFRICA 33.1 (A) 2020    EGFRNONAA 28.7 (A) 2020     A1C: 10.7    PCR: 9.4    Lab  Results   Component Value Date    WBC 7.32 2020    HGB 8.9 (L) 2020    HCT 27.7 (L) 2020    MCV 91 2020     (H) 2020       ASSESSMENT/PLAN:     25 y.o.  female with IUP at 19w6d     Concern for early Fetal growth restriction  On today's ultrasound we see fetal biometry lagging by about 2 weeks on average, with some measurements lagging almost 3 weeks (187g today).  These findings are concerning for early fetal growth restriction, which we are unable to officially diagnose given the early gestation.  Additionally, Doppler studies would not be of use today given the early gestation.   I counseled the patient and her partner today about these findings and my concerns especially in the setting of her poorly controlled chronic diseases. Potential etiologies of FGR include, but are not limited to, normal variation of stature, placental insufficiency, chromosomal abnormalities, genetic disorders, infections, medical conditions, teratogen exposure and other etiologies. Cell free DNA was low risk and there are no findings on today's ultrasound concerning for aneuploidy, although this was limited. I did offer amniocentesis as a diagnostic test and reviewed risk associated with this invasive procedure, according a 1 in 1000 risk of possible miscarriage, rupture membranes,  delivery, bleeding, infection.  The patient declined amniocentesis at this time.    I believe the etiology is most likely the result of poor placental perfusion in the setting of chronic diseases.   We discussed the increased risk of intrauterine fetal demise especially in the setting of extremely early growth restriction.  I briefly counseled on periviability, although further counseling will be deferred until later gestation and after we continue to observe further evolution of the pregnancy.   I counseled the patient and her partner that we will continue closely monitoring this  pregnancy.    Recommendations:   A follow up growth ultrasound in 2 weeks was scheduled today.   The patient is to perform fetal kick counts.    Monitor closely for any signs of evolving preeclampsia - none today      Type 1 DM  Defer to endocrine for insulin regimen management. Patient has had good follow up with Dr. Mayo. She is sending her BS log regularly. Fasting values remain elevated.  HbA1c will be drawn today.  Stressed the importance of keeping an updated sugar log and maintaining good glucose control, especially in setting of CKD.      HTN  BPs better controlled on current regimen  Goal of <140/90 which she is at.  Continue current BP med regimen (coreg, procardia)      Stage III CKD  Patient continues to be closely followed with Nephrology  I again expressed my concern to the patient regarding her current disease process and recent progression, now in the setting of likely early FGR.   She voiced understanding of the risks (not limited to Pre-E, irreversible kidney damage, necessitating dialysis, miscarriage, PTD, FGR, CD) and at this time would like to proceed with pregnancy.  Recommend continuing with Lovenox given her high risk of thrombus formation  Continue ASA prophylaxis as well for Pre-E prevention.   Anemia has improved after IV Iron infusions. Continue Iron supplementation.  Continue Bicarb as prescribed.  I will defer further management per nephrology recommendations.    The patient was sent to lab today in order to continue monitoring her disease states.      Patient is to RTC in 2 weeks for repeat growth scan and MD visit will be done at that time.      Time spent in consultation today: 30 minutes, >50% of which was face-to-face time with the patient.    Jose Kerr MD   Maternal-Fetal Medicine

## 2020-10-15 PROBLEM — O36.5920 POOR FETAL GROWTH AFFECTING MANAGEMENT OF MOTHER IN SECOND TRIMESTER: Status: ACTIVE | Noted: 2020-10-15

## 2020-10-20 ENCOUNTER — TELEPHONE (OUTPATIENT)
Dept: ENDOCRINOLOGY | Facility: CLINIC | Age: 25
End: 2020-10-20

## 2020-10-20 NOTE — TELEPHONE ENCOUNTER
----- Message from Katherine Turcios LPN sent at 10/19/2020  4:07 PM CDT -----  Regarding: FW: insulin regimen    ----- Message -----  From: Emma Basilio RN  Sent: 10/19/2020   7:10 AM CDT  To: Katherine Turcios LPN  Subject: FW: insulin regimen                                ----- Message -----  From: Luciana Mayo MD  Sent: 10/18/2020   2:40 PM CDT  To: Jose Kerr MD, Emma Basilio, RN  Subject: RE: insulin regimen                              She missed her last appointment so I havent seen her in a bit.   She has pretty significant low blood sugars and I think this fear drives much of her decision making, which is understandable.     We can reach out to her download her ally and set her up with a virtual appointment. Will cc our clinic nurse, Ms. Basilio.   Thanks for message.   SD  ----- Message -----  From: Jose Kerr MD  Sent: 10/15/2020   8:31 AM CDT  To: Luciana Mayo MD  Subject: insulin regimen                                  Good morning,    I recently saw our mutual patient yesterday.  Her sugars were reviewed and her PP look good. I am concerned for her fasting values where 11/12 of them were above target ( ; although averaging in the 110s).  I didn't want to adjust her insulin since I know you have been managing her regimen. I just wanted to reach out to let you know her values. She reports taking 20u lantus at night.  Thank you for your help.    Jose

## 2020-10-23 ENCOUNTER — LAB VISIT (OUTPATIENT)
Dept: LAB | Facility: HOSPITAL | Age: 25
End: 2020-10-23
Attending: INTERNAL MEDICINE
Payer: COMMERCIAL

## 2020-10-23 ENCOUNTER — TELEPHONE (OUTPATIENT)
Dept: NEPHROLOGY | Facility: CLINIC | Age: 25
End: 2020-10-23

## 2020-10-23 DIAGNOSIS — N18.4 CKD (CHRONIC KIDNEY DISEASE) STAGE 4, GFR 15-29 ML/MIN: ICD-10-CM

## 2020-10-23 LAB
ALBUMIN SERPL BCP-MCNC: 1.7 G/DL (ref 3.5–5.2)
ANION GAP SERPL CALC-SCNC: 11 MMOL/L (ref 8–16)
BASOPHILS # BLD AUTO: 0.02 K/UL (ref 0–0.2)
BASOPHILS NFR BLD: 0.2 % (ref 0–1.9)
BUN SERPL-MCNC: 53 MG/DL (ref 6–20)
CALCIUM SERPL-MCNC: 8.8 MG/DL (ref 8.7–10.5)
CHLORIDE SERPL-SCNC: 97 MMOL/L (ref 95–110)
CO2 SERPL-SCNC: 24 MMOL/L (ref 23–29)
CREAT SERPL-MCNC: 2.7 MG/DL (ref 0.5–1.4)
DIFFERENTIAL METHOD: ABNORMAL
EOSINOPHIL # BLD AUTO: 0 K/UL (ref 0–0.5)
EOSINOPHIL NFR BLD: 0.3 % (ref 0–8)
ERYTHROCYTE [DISTWIDTH] IN BLOOD BY AUTOMATED COUNT: 12.4 % (ref 11.5–14.5)
EST. GFR  (AFRICAN AMERICAN): 27.2 ML/MIN/1.73 M^2
EST. GFR  (NON AFRICAN AMERICAN): 23.6 ML/MIN/1.73 M^2
FACT X PPP CHRO-ACNC: <0.1 IU/ML (ref 0.3–0.7)
GLUCOSE SERPL-MCNC: 194 MG/DL (ref 70–110)
HCT VFR BLD AUTO: 30.4 % (ref 37–48.5)
HGB BLD-MCNC: 9.8 G/DL (ref 12–16)
IMM GRANULOCYTES # BLD AUTO: 0.03 K/UL (ref 0–0.04)
IMM GRANULOCYTES NFR BLD AUTO: 0.3 % (ref 0–0.5)
LYMPHOCYTES # BLD AUTO: 3.1 K/UL (ref 1–4.8)
LYMPHOCYTES NFR BLD: 31.3 % (ref 18–48)
MCH RBC QN AUTO: 28.8 PG (ref 27–31)
MCHC RBC AUTO-ENTMCNC: 32.2 G/DL (ref 32–36)
MCV RBC AUTO: 89 FL (ref 82–98)
MONOCYTES # BLD AUTO: 0.5 K/UL (ref 0.3–1)
MONOCYTES NFR BLD: 4.9 % (ref 4–15)
NEUTROPHILS # BLD AUTO: 6.3 K/UL (ref 1.8–7.7)
NEUTROPHILS NFR BLD: 63 % (ref 38–73)
NRBC BLD-RTO: 0 /100 WBC
PHOSPHATE SERPL-MCNC: 3.4 MG/DL (ref 2.7–4.5)
PLATELET # BLD AUTO: 341 K/UL (ref 150–350)
PMV BLD AUTO: 10.5 FL (ref 9.2–12.9)
POTASSIUM SERPL-SCNC: 4.4 MMOL/L (ref 3.5–5.1)
RBC # BLD AUTO: 3.4 M/UL (ref 4–5.4)
SODIUM SERPL-SCNC: 132 MMOL/L (ref 136–145)
WBC # BLD AUTO: 9.99 K/UL (ref 3.9–12.7)

## 2020-10-23 PROCEDURE — 36415 COLL VENOUS BLD VENIPUNCTURE: CPT

## 2020-10-23 PROCEDURE — 80069 RENAL FUNCTION PANEL: CPT

## 2020-10-23 PROCEDURE — 85025 COMPLETE CBC W/AUTO DIFF WBC: CPT

## 2020-10-23 PROCEDURE — 85520 HEPARIN ASSAY: CPT

## 2020-10-23 NOTE — TELEPHONE ENCOUNTER
Talked to patient on phone as she missed appointment last week, she reports doing ok. Requested her to get some labs done today to monitor the kidney function and anemia. Orders placed and lab appointment made.

## 2020-10-27 ENCOUNTER — TELEPHONE (OUTPATIENT)
Dept: CRITICAL CARE MEDICINE | Facility: HOSPITAL | Age: 25
End: 2020-10-27

## 2020-10-27 DIAGNOSIS — N18.32 STAGE 3B CHRONIC KIDNEY DISEASE: Primary | ICD-10-CM
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
## 2020-10-27 NOTE — TELEPHONE ENCOUNTER
Late entry,  Date on call 10/26/2020    Called patient, no answer. Called mom and informed her that the kidney function is getting worse to the point that we need to consider starting her on the dialysis. She verbalized understanding. Will repeat labs again to confirm them.

## 2020-10-28 ENCOUNTER — TELEPHONE (OUTPATIENT)
Dept: MATERNAL FETAL MEDICINE | Facility: CLINIC | Age: 25
End: 2020-10-28

## 2020-10-28 NOTE — TELEPHONE ENCOUNTER
Message left for pt to call MFM clinic at [phone] to reschedule MFM appointment for later today.

## 2020-10-30 ENCOUNTER — TELEPHONE (OUTPATIENT)
Dept: MATERNAL FETAL MEDICINE | Facility: CLINIC | Age: 25
End: 2020-10-30

## 2020-10-30 ENCOUNTER — PATIENT MESSAGE (OUTPATIENT)
Dept: MATERNAL FETAL MEDICINE | Facility: CLINIC | Age: 25
End: 2020-10-30
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
## 2020-11-02 DIAGNOSIS — N18.4 CKD (CHRONIC KIDNEY DISEASE) STAGE 4, GFR 15-29 ML/MIN: Primary | ICD-10-CM

## 2020-11-02 NOTE — PROGRESS NOTES
Talked to patient and discussed her labs, informed her that she is close to getting started on dialysis to keep the baby safe. Will get another set of labs tomorrow.

## 2020-11-03 ENCOUNTER — CLINICAL SUPPORT (OUTPATIENT)
Dept: DIABETES | Facility: CLINIC | Age: 25
End: 2020-11-03
Payer: COMMERCIAL

## 2020-11-03 ENCOUNTER — OFFICE VISIT (OUTPATIENT)
Dept: ENDOCRINOLOGY | Facility: CLINIC | Age: 25
End: 2020-11-03
Payer: COMMERCIAL

## 2020-11-03 ENCOUNTER — LAB VISIT (OUTPATIENT)
Dept: LAB | Facility: HOSPITAL | Age: 25
End: 2020-11-03
Attending: INTERNAL MEDICINE
Payer: COMMERCIAL

## 2020-11-03 VITALS
OXYGEN SATURATION: 99 % | HEART RATE: 100 BPM | DIASTOLIC BLOOD PRESSURE: 110 MMHG | BODY MASS INDEX: 27.42 KG/M2 | HEIGHT: 64 IN | SYSTOLIC BLOOD PRESSURE: 158 MMHG | WEIGHT: 160.63 LBS | RESPIRATION RATE: 18 BRPM

## 2020-11-03 DIAGNOSIS — E10.22 TYPE 1 DIABETES MELLITUS WITH STAGE 3B CHRONIC KIDNEY DISEASE: ICD-10-CM

## 2020-11-03 DIAGNOSIS — N18.4 CKD (CHRONIC KIDNEY DISEASE) STAGE 4, GFR 15-29 ML/MIN: ICD-10-CM

## 2020-11-03 DIAGNOSIS — N18.32 TYPE 1 DIABETES MELLITUS WITH STAGE 3B CHRONIC KIDNEY DISEASE: ICD-10-CM

## 2020-11-03 DIAGNOSIS — E10.22 TYPE 1 DIABETES MELLITUS WITH STAGE 3 CHRONIC KIDNEY DISEASE: ICD-10-CM

## 2020-11-03 DIAGNOSIS — E11.649 HYPOGLYCEMIA ASSOCIATED WITH DIABETES: ICD-10-CM

## 2020-11-03 DIAGNOSIS — N18.30 TYPE 1 DIABETES MELLITUS WITH STAGE 3 CHRONIC KIDNEY DISEASE: ICD-10-CM

## 2020-11-03 LAB
ALBUMIN SERPL BCP-MCNC: 1.8 G/DL (ref 3.5–5.2)
ANION GAP SERPL CALC-SCNC: 10 MMOL/L (ref 8–16)
BASOPHILS # BLD AUTO: 0.03 K/UL (ref 0–0.2)
BASOPHILS NFR BLD: 0.3 % (ref 0–1.9)
BUN SERPL-MCNC: 39 MG/DL (ref 6–20)
CALCIUM SERPL-MCNC: 8.8 MG/DL (ref 8.7–10.5)
CHLORIDE SERPL-SCNC: 107 MMOL/L (ref 95–110)
CO2 SERPL-SCNC: 19 MMOL/L (ref 23–29)
CREAT SERPL-MCNC: 2.6 MG/DL (ref 0.5–1.4)
DIFFERENTIAL METHOD: ABNORMAL
EOSINOPHIL # BLD AUTO: 0.1 K/UL (ref 0–0.5)
EOSINOPHIL NFR BLD: 1.2 % (ref 0–8)
ERYTHROCYTE [DISTWIDTH] IN BLOOD BY AUTOMATED COUNT: 13 % (ref 11.5–14.5)
EST. GFR  (AFRICAN AMERICAN): 28.5 ML/MIN/1.73 M^2
EST. GFR  (NON AFRICAN AMERICAN): 24.7 ML/MIN/1.73 M^2
ESTIMATED AVG GLUCOSE: 266 MG/DL (ref 68–131)
GLUCOSE SERPL-MCNC: 142 MG/DL (ref 70–110)
HBA1C MFR BLD HPLC: 10.9 % (ref 4–5.6)
HCT VFR BLD AUTO: 29.1 % (ref 37–48.5)
HGB BLD-MCNC: 9.3 G/DL (ref 12–16)
IMM GRANULOCYTES # BLD AUTO: 0.05 K/UL (ref 0–0.04)
IMM GRANULOCYTES NFR BLD AUTO: 0.6 % (ref 0–0.5)
LYMPHOCYTES # BLD AUTO: 2.9 K/UL (ref 1–4.8)
LYMPHOCYTES NFR BLD: 32.9 % (ref 18–48)
MCH RBC QN AUTO: 29.6 PG (ref 27–31)
MCHC RBC AUTO-ENTMCNC: 32 G/DL (ref 32–36)
MCV RBC AUTO: 93 FL (ref 82–98)
MONOCYTES # BLD AUTO: 0.5 K/UL (ref 0.3–1)
MONOCYTES NFR BLD: 5.2 % (ref 4–15)
NEUTROPHILS # BLD AUTO: 5.3 K/UL (ref 1.8–7.7)
NEUTROPHILS NFR BLD: 59.8 % (ref 38–73)
NRBC BLD-RTO: 0 /100 WBC
PHOSPHATE SERPL-MCNC: 4.5 MG/DL (ref 2.7–4.5)
PLATELET # BLD AUTO: 433 K/UL (ref 150–350)
PMV BLD AUTO: 10.9 FL (ref 9.2–12.9)
POTASSIUM SERPL-SCNC: 4.7 MMOL/L (ref 3.5–5.1)
RBC # BLD AUTO: 3.14 M/UL (ref 4–5.4)
SODIUM SERPL-SCNC: 136 MMOL/L (ref 136–145)
WBC # BLD AUTO: 8.9 K/UL (ref 3.9–12.7)

## 2020-11-03 PROCEDURE — 99999 PR PBB SHADOW E&M-EST. PATIENT-LVL V: ICD-10-PCS | Mod: PBBFAC,,, | Performed by: INTERNAL MEDICINE

## 2020-11-03 PROCEDURE — 99214 PR OFFICE/OUTPT VISIT, EST, LEVL IV, 30-39 MIN: ICD-10-PCS | Mod: S$GLB,,, | Performed by: INTERNAL MEDICINE

## 2020-11-03 PROCEDURE — 99214 OFFICE O/P EST MOD 30 MIN: CPT | Mod: S$GLB,,, | Performed by: INTERNAL MEDICINE

## 2020-11-03 PROCEDURE — 99999 PR PBB SHADOW E&M-EST. PATIENT-LVL V: CPT | Mod: PBBFAC,,, | Performed by: INTERNAL MEDICINE

## 2020-11-03 PROCEDURE — 83036 HEMOGLOBIN GLYCOSYLATED A1C: CPT

## 2020-11-03 PROCEDURE — 80069 RENAL FUNCTION PANEL: CPT

## 2020-11-03 PROCEDURE — 36415 COLL VENOUS BLD VENIPUNCTURE: CPT

## 2020-11-03 PROCEDURE — 85025 COMPLETE CBC W/AUTO DIFF WBC: CPT
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
## 2020-11-03 NOTE — PATIENT INSTRUCTIONS
Add 1:10 insulin to carb ratio for snacks     Treatment goals reviewed today.   Fasting and pre-meal plasma glucose, < 90  One hour post meal blood sugars goals 140 mg d/l  If checking two hour post meal blood sugars, advise < 120

## 2020-11-03 NOTE — PROGRESS NOTES
Diabetes Education  Author: Holli Ivey RN, CDE  Date: 11/3/2020          Last A1c:   Lab Results   Component Value Date    HGBA1C 10.7 (H) 09/02/2020     Last Visit with Diabetes Educator: : 11/03/2020  Last OPCM Referral: : Not Found   Enrolled in OPCM: No    Started pt today on Freestyle Jayda 2 per Dr. Mayo request.  She has used the Freestyle Jayda in the past.  Alarms set as follows: Low at 70 and Mode at 140. Advised patient of alarms, checking glucose if prompted, caution with Vit C and aspirin, x-rays. Sensor applied to right upper arm.  Patient will be following up with MD.  Advised to bring reader at visit. Understanding was verbalized.    Education time: 15 mins
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
## 2020-11-04 ENCOUNTER — TELEPHONE (OUTPATIENT)
Dept: NEPHROLOGY | Facility: CLINIC | Age: 25
End: 2020-11-04
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
## 2020-11-06 ENCOUNTER — INITIAL CONSULT (OUTPATIENT)
Dept: MATERNAL FETAL MEDICINE | Facility: CLINIC | Age: 25
End: 2020-11-06
Payer: COMMERCIAL

## 2020-11-06 ENCOUNTER — ANESTHESIA EVENT (OUTPATIENT)
Dept: OBSTETRICS AND GYNECOLOGY | Facility: OTHER | Age: 25
End: 2020-11-06
Payer: COMMERCIAL

## 2020-11-06 ENCOUNTER — ANESTHESIA (OUTPATIENT)
Dept: OBSTETRICS AND GYNECOLOGY | Facility: OTHER | Age: 25
End: 2020-11-06
Payer: COMMERCIAL

## 2020-11-06 ENCOUNTER — PROCEDURE VISIT (OUTPATIENT)
Dept: MATERNAL FETAL MEDICINE | Facility: CLINIC | Age: 25
End: 2020-11-06
Payer: COMMERCIAL

## 2020-11-06 ENCOUNTER — HOSPITAL ENCOUNTER (INPATIENT)
Facility: OTHER | Age: 25
LOS: 5 days | Discharge: HOME | End: 2020-11-11
Attending: OBSTETRICS & GYNECOLOGY | Admitting: OBSTETRICS & GYNECOLOGY
Payer: COMMERCIAL

## 2020-11-06 VITALS
DIASTOLIC BLOOD PRESSURE: 98 MMHG | HEIGHT: 64 IN | SYSTOLIC BLOOD PRESSURE: 160 MMHG | WEIGHT: 161.81 LBS | BODY MASS INDEX: 27.63 KG/M2

## 2020-11-06 DIAGNOSIS — O36.5990 PREGNANCY AFFECTED BY FETAL GROWTH RESTRICTION: ICD-10-CM

## 2020-11-06 DIAGNOSIS — O16.2 ELEVATED BLOOD PRESSURE AFFECTING PREGNANCY IN SECOND TRIMESTER, ANTEPARTUM: ICD-10-CM

## 2020-11-06 DIAGNOSIS — N18.32 TYPE 1 DIABETES MELLITUS WITH STAGE 3B CHRONIC KIDNEY DISEASE: Primary | ICD-10-CM

## 2020-11-06 DIAGNOSIS — Z3A.23 23 WEEKS GESTATION OF PREGNANCY: ICD-10-CM

## 2020-11-06 DIAGNOSIS — E10.22 TYPE 1 DIABETES MELLITUS WITH STAGE 3B CHRONIC KIDNEY DISEASE: Primary | ICD-10-CM

## 2020-11-06 DIAGNOSIS — I10 HYPERTENSION, UNSPECIFIED TYPE: ICD-10-CM

## 2020-11-06 DIAGNOSIS — N28.9 RENAL DISEASE: ICD-10-CM

## 2020-11-06 DIAGNOSIS — E87.5 HYPERKALEMIA: ICD-10-CM

## 2020-11-06 DIAGNOSIS — O24.012 TYPE 1 DIABETES MELLITUS AFFECTING PREGNANCY IN SECOND TRIMESTER, ANTEPARTUM: ICD-10-CM

## 2020-11-06 DIAGNOSIS — R03.0 ELEVATED BLOOD PRESSURE READING: ICD-10-CM

## 2020-11-06 DIAGNOSIS — O14.12 SEVERE PRE-ECLAMPSIA IN SECOND TRIMESTER: ICD-10-CM

## 2020-11-06 DIAGNOSIS — O10.912 CHRONIC HYPERTENSION WITH EXACERBATION DURING PREGNANCY IN SECOND TRIMESTER: ICD-10-CM

## 2020-11-06 DIAGNOSIS — N18.32 STAGE 3B CHRONIC KIDNEY DISEASE: Primary | ICD-10-CM

## 2020-11-06 LAB
ALBUMIN SERPL BCP-MCNC: 1.6 G/DL (ref 3.5–5.2)
ALBUMIN SERPL BCP-MCNC: 1.7 G/DL (ref 3.5–5.2)
ALP SERPL-CCNC: 60 U/L (ref 55–135)
ALP SERPL-CCNC: 65 U/L (ref 55–135)
ALT SERPL W/O P-5'-P-CCNC: 18 U/L (ref 10–44)
ALT SERPL W/O P-5'-P-CCNC: 19 U/L (ref 10–44)
ANION GAP SERPL CALC-SCNC: 7 MMOL/L (ref 8–16)
ANION GAP SERPL CALC-SCNC: 8 MMOL/L (ref 8–16)
AST SERPL-CCNC: 29 U/L (ref 10–40)
AST SERPL-CCNC: 30 U/L (ref 10–40)
B-OH-BUTYR BLD STRIP-SCNC: 0.1 MMOL/L (ref 0–0.5)
BASOPHILS # BLD AUTO: 0.03 K/UL (ref 0–0.2)
BASOPHILS NFR BLD: 0.3 % (ref 0–1.9)
BILIRUB SERPL-MCNC: 0.1 MG/DL (ref 0.1–1)
BILIRUB SERPL-MCNC: 0.2 MG/DL (ref 0.1–1)
BUN SERPL-MCNC: 37 MG/DL (ref 6–20)
BUN SERPL-MCNC: 38 MG/DL (ref 6–20)
CALCIUM SERPL-MCNC: 8.4 MG/DL (ref 8.7–10.5)
CALCIUM SERPL-MCNC: 8.6 MG/DL (ref 8.7–10.5)
CHLORIDE SERPL-SCNC: 107 MMOL/L (ref 95–110)
CHLORIDE SERPL-SCNC: 109 MMOL/L (ref 95–110)
CO2 SERPL-SCNC: 18 MMOL/L (ref 23–29)
CO2 SERPL-SCNC: 18 MMOL/L (ref 23–29)
CREAT SERPL-MCNC: 2.8 MG/DL (ref 0.5–1.4)
CREAT SERPL-MCNC: 3 MG/DL (ref 0.5–1.4)
CREAT UR-MCNC: 48.7 MG/DL (ref 15–325)
DIFFERENTIAL METHOD: ABNORMAL
EOSINOPHIL # BLD AUTO: 0.1 K/UL (ref 0–0.5)
EOSINOPHIL NFR BLD: 0.9 % (ref 0–8)
ERYTHROCYTE [DISTWIDTH] IN BLOOD BY AUTOMATED COUNT: 13.2 % (ref 11.5–14.5)
EST. GFR  (AFRICAN AMERICAN): 24 ML/MIN/1.73 M^2
EST. GFR  (AFRICAN AMERICAN): 26 ML/MIN/1.73 M^2
EST. GFR  (NON AFRICAN AMERICAN): 21 ML/MIN/1.73 M^2
EST. GFR  (NON AFRICAN AMERICAN): 23 ML/MIN/1.73 M^2
GLUCOSE SERPL-MCNC: 261 MG/DL (ref 70–110)
GLUCOSE SERPL-MCNC: 81 MG/DL (ref 70–110)
HCT VFR BLD AUTO: 26.8 % (ref 37–48.5)
HGB BLD-MCNC: 8.8 G/DL (ref 12–16)
IMM GRANULOCYTES # BLD AUTO: 0.04 K/UL (ref 0–0.04)
IMM GRANULOCYTES NFR BLD AUTO: 0.5 % (ref 0–0.5)
LYMPHOCYTES # BLD AUTO: 2.3 K/UL (ref 1–4.8)
LYMPHOCYTES NFR BLD: 26.3 % (ref 18–48)
MAGNESIUM SERPL-MCNC: 3.2 MG/DL (ref 1.6–2.6)
MCH RBC QN AUTO: 29.3 PG (ref 27–31)
MCHC RBC AUTO-ENTMCNC: 32.8 G/DL (ref 32–36)
MCV RBC AUTO: 89 FL (ref 82–98)
MONOCYTES # BLD AUTO: 0.6 K/UL (ref 0.3–1)
MONOCYTES NFR BLD: 6.8 % (ref 4–15)
NEUTROPHILS # BLD AUTO: 5.8 K/UL (ref 1.8–7.7)
NEUTROPHILS NFR BLD: 65.2 % (ref 38–73)
NRBC BLD-RTO: 0 /100 WBC
PLATELET # BLD AUTO: 404 K/UL (ref 150–350)
PMV BLD AUTO: 10.9 FL (ref 9.2–12.9)
POCT GLUCOSE: 105 MG/DL (ref 70–110)
POCT GLUCOSE: 120 MG/DL (ref 70–110)
POCT GLUCOSE: 56 MG/DL (ref 70–110)
POCT GLUCOSE: 69 MG/DL (ref 70–110)
POCT GLUCOSE: 73 MG/DL (ref 70–110)
POCT GLUCOSE: 77 MG/DL (ref 70–110)
POCT GLUCOSE: 78 MG/DL (ref 70–110)
POCT GLUCOSE: 84 MG/DL (ref 70–110)
POCT GLUCOSE: 97 MG/DL (ref 70–110)
POTASSIUM SERPL-SCNC: 4.6 MMOL/L (ref 3.5–5.1)
POTASSIUM SERPL-SCNC: 5 MMOL/L (ref 3.5–5.1)
POTASSIUM SERPL-SCNC: 6.2 MMOL/L (ref 3.5–5.1)
PROT SERPL-MCNC: 5.8 G/DL (ref 6–8.4)
PROT SERPL-MCNC: 6.2 G/DL (ref 6–8.4)
PROT UR-MCNC: 803 MG/DL (ref 0–15)
PROT/CREAT UR: 16.49 MG/G{CREAT} (ref 0–0.2)
RBC # BLD AUTO: 3 M/UL (ref 4–5.4)
SARS-COV-2 RDRP RESP QL NAA+PROBE: NEGATIVE
SODIUM SERPL-SCNC: 132 MMOL/L (ref 136–145)
SODIUM SERPL-SCNC: 135 MMOL/L (ref 136–145)
WBC # BLD AUTO: 8.82 K/UL (ref 3.9–12.7)

## 2020-11-06 PROCEDURE — 76816 OB US FOLLOW-UP PER FETUS: CPT | Mod: S$GLB,,, | Performed by: OBSTETRICS & GYNECOLOGY

## 2020-11-06 PROCEDURE — 80053 COMPREHEN METABOLIC PANEL: CPT | Mod: 91

## 2020-11-06 PROCEDURE — 25000003 PHARM REV CODE 250: Performed by: STUDENT IN AN ORGANIZED HEALTH CARE EDUCATION/TRAINING PROGRAM

## 2020-11-06 PROCEDURE — 25000003 PHARM REV CODE 250: Performed by: INTERNAL MEDICINE

## 2020-11-06 PROCEDURE — 82010 KETONE BODYS QUAN: CPT

## 2020-11-06 PROCEDURE — 36415 COLL VENOUS BLD VENIPUNCTURE: CPT

## 2020-11-06 PROCEDURE — 99285 EMERGENCY DEPT VISIT HI MDM: CPT | Mod: 25

## 2020-11-06 PROCEDURE — 63600175 PHARM REV CODE 636 W HCPCS: Performed by: STUDENT IN AN ORGANIZED HEALTH CARE EDUCATION/TRAINING PROGRAM

## 2020-11-06 PROCEDURE — 25000003 PHARM REV CODE 250: Performed by: OBSTETRICS & GYNECOLOGY

## 2020-11-06 PROCEDURE — 99999 PR PBB SHADOW E&M-EST. PATIENT-LVL III: CPT | Mod: PBBFAC,,, | Performed by: OBSTETRICS & GYNECOLOGY

## 2020-11-06 PROCEDURE — 80053 COMPREHEN METABOLIC PANEL: CPT

## 2020-11-06 PROCEDURE — 99232 PR SUBSEQUENT HOSPITAL CARE,LEVL II: ICD-10-PCS | Mod: ,,, | Performed by: OBSTETRICS & GYNECOLOGY

## 2020-11-06 PROCEDURE — 93005 ELECTROCARDIOGRAM TRACING: CPT

## 2020-11-06 PROCEDURE — 99214 OFFICE O/P EST MOD 30 MIN: CPT | Mod: 25,S$GLB,, | Performed by: OBSTETRICS & GYNECOLOGY

## 2020-11-06 PROCEDURE — 11000001 HC ACUTE MED/SURG PRIVATE ROOM

## 2020-11-06 PROCEDURE — 83735 ASSAY OF MAGNESIUM: CPT

## 2020-11-06 PROCEDURE — 84132 ASSAY OF SERUM POTASSIUM: CPT

## 2020-11-06 PROCEDURE — 94644 CONT INHLJ TX 1ST HOUR: CPT

## 2020-11-06 PROCEDURE — U0002 COVID-19 LAB TEST NON-CDC: HCPCS

## 2020-11-06 PROCEDURE — 93010 EKG 12-LEAD: ICD-10-PCS | Mod: ,,, | Performed by: INTERNAL MEDICINE

## 2020-11-06 PROCEDURE — 82570 ASSAY OF URINE CREATININE: CPT

## 2020-11-06 PROCEDURE — 99232 SBSQ HOSP IP/OBS MODERATE 35: CPT | Mod: ,,, | Performed by: OBSTETRICS & GYNECOLOGY

## 2020-11-06 PROCEDURE — 76816 PR  US,PREGNANT UTERUS,F/U,TRANSABD APP: ICD-10-PCS | Mod: S$GLB,,, | Performed by: OBSTETRICS & GYNECOLOGY

## 2020-11-06 PROCEDURE — 25000242 PHARM REV CODE 250 ALT 637 W/ HCPCS: Performed by: OBSTETRICS & GYNECOLOGY

## 2020-11-06 PROCEDURE — 99999 PR PBB SHADOW E&M-EST. PATIENT-LVL III: ICD-10-PCS | Mod: PBBFAC,,, | Performed by: OBSTETRICS & GYNECOLOGY

## 2020-11-06 PROCEDURE — 94761 N-INVAS EAR/PLS OXIMETRY MLT: CPT

## 2020-11-06 PROCEDURE — 93010 ELECTROCARDIOGRAM REPORT: CPT | Mod: ,,, | Performed by: INTERNAL MEDICINE

## 2020-11-06 PROCEDURE — 63600175 PHARM REV CODE 636 W HCPCS: Performed by: OBSTETRICS & GYNECOLOGY

## 2020-11-06 PROCEDURE — 99214 PR OFFICE/OUTPT VISIT, EST, LEVL IV, 30-39 MIN: ICD-10-PCS | Mod: 25,S$GLB,, | Performed by: OBSTETRICS & GYNECOLOGY

## 2020-11-06 PROCEDURE — 85025 COMPLETE CBC W/AUTO DIFF WBC: CPT

## 2020-11-06 RX ORDER — SODIUM CHLORIDE, SODIUM LACTATE, POTASSIUM CHLORIDE, CALCIUM CHLORIDE 600; 310; 30; 20 MG/100ML; MG/100ML; MG/100ML; MG/100ML
INJECTION, SOLUTION INTRAVENOUS CONTINUOUS
Status: DISCONTINUED | OUTPATIENT
Start: 2020-11-06 | End: 2020-11-06

## 2020-11-06 RX ORDER — LABETALOL HYDROCHLORIDE 5 MG/ML
20 INJECTION, SOLUTION INTRAVENOUS ONCE AS NEEDED
Status: COMPLETED | OUTPATIENT
Start: 2020-11-06 | End: 2020-11-06

## 2020-11-06 RX ORDER — GLUCAGON 1 MG
1 KIT INJECTION
Status: DISCONTINUED | OUTPATIENT
Start: 2020-11-06 | End: 2020-11-06

## 2020-11-06 RX ORDER — NIFEDIPINE 10 MG/1
10 CAPSULE ORAL ONCE
Status: COMPLETED | OUTPATIENT
Start: 2020-11-06 | End: 2020-11-06

## 2020-11-06 RX ORDER — IBUPROFEN 200 MG
24 TABLET ORAL
Status: DISCONTINUED | OUTPATIENT
Start: 2020-11-06 | End: 2020-11-08

## 2020-11-06 RX ORDER — CITALOPRAM 20 MG/1
20 TABLET, FILM COATED ORAL DAILY
Status: DISCONTINUED | OUTPATIENT
Start: 2020-11-06 | End: 2020-11-09

## 2020-11-06 RX ORDER — OXYTOCIN/RINGER'S LACTATE 30/500 ML
334 PLASTIC BAG, INJECTION (ML) INTRAVENOUS ONCE
Status: DISCONTINUED | OUTPATIENT
Start: 2020-11-06 | End: 2020-11-08

## 2020-11-06 RX ORDER — LABETALOL HYDROCHLORIDE 5 MG/ML
40 INJECTION, SOLUTION INTRAVENOUS ONCE
Status: COMPLETED | OUTPATIENT
Start: 2020-11-06 | End: 2020-11-06

## 2020-11-06 RX ORDER — GLUCAGON 1 MG
1 KIT INJECTION CONTINUOUS PRN
Status: DISCONTINUED | OUTPATIENT
Start: 2020-11-06 | End: 2020-11-08

## 2020-11-06 RX ORDER — CALCIUM GLUCONATE 98 MG/ML
1 INJECTION, SOLUTION INTRAVENOUS
Status: DISCONTINUED | OUTPATIENT
Start: 2020-11-06 | End: 2020-11-11 | Stop reason: HOSPADM

## 2020-11-06 RX ORDER — OXYTOCIN/RINGER'S LACTATE 30/500 ML
95 PLASTIC BAG, INJECTION (ML) INTRAVENOUS ONCE
Status: DISCONTINUED | OUTPATIENT
Start: 2020-11-06 | End: 2020-11-08

## 2020-11-06 RX ORDER — DEXTROSE, SODIUM CHLORIDE, SODIUM LACTATE, POTASSIUM CHLORIDE, AND CALCIUM CHLORIDE 5; .6; .31; .03; .02 G/100ML; G/100ML; G/100ML; G/100ML; G/100ML
INJECTION, SOLUTION INTRAVENOUS CONTINUOUS
Status: DISCONTINUED | OUTPATIENT
Start: 2020-11-06 | End: 2020-11-08

## 2020-11-06 RX ORDER — NIFEDIPINE 30 MG/1
30 TABLET, EXTENDED RELEASE ORAL DAILY
Status: DISCONTINUED | OUTPATIENT
Start: 2020-11-06 | End: 2020-11-06

## 2020-11-06 RX ORDER — CALCIUM GLUCONATE 20 MG/ML
1 INJECTION, SOLUTION INTRAVENOUS
Status: COMPLETED | OUTPATIENT
Start: 2020-11-06 | End: 2020-11-06

## 2020-11-06 RX ORDER — SIMETHICONE 80 MG
1 TABLET,CHEWABLE ORAL 4 TIMES DAILY PRN
Status: DISCONTINUED | OUTPATIENT
Start: 2020-11-06 | End: 2020-11-08

## 2020-11-06 RX ORDER — NIFEDIPINE 30 MG/1
30 TABLET, EXTENDED RELEASE ORAL ONCE
Status: COMPLETED | OUTPATIENT
Start: 2020-11-06 | End: 2020-11-06

## 2020-11-06 RX ORDER — HYDRALAZINE HYDROCHLORIDE 20 MG/ML
5 INJECTION INTRAMUSCULAR; INTRAVENOUS ONCE
Status: COMPLETED | OUTPATIENT
Start: 2020-11-06 | End: 2020-11-06

## 2020-11-06 RX ORDER — CARVEDILOL 12.5 MG/1
12.5 TABLET ORAL 2 TIMES DAILY
Status: DISCONTINUED | OUTPATIENT
Start: 2020-11-06 | End: 2020-11-06

## 2020-11-06 RX ORDER — HYDROCHLOROTHIAZIDE 25 MG/1
25 TABLET ORAL DAILY
Status: DISCONTINUED | OUTPATIENT
Start: 2020-11-07 | End: 2020-11-08

## 2020-11-06 RX ORDER — ASPIRIN 81 MG/1
81 TABLET ORAL NIGHTLY
Status: DISCONTINUED | OUTPATIENT
Start: 2020-11-06 | End: 2020-11-06

## 2020-11-06 RX ORDER — INSULIN ASPART 100 [IU]/ML
1-10 INJECTION, SOLUTION INTRAVENOUS; SUBCUTANEOUS EVERY 6 HOURS PRN
Status: DISCONTINUED | OUTPATIENT
Start: 2020-11-06 | End: 2020-11-08

## 2020-11-06 RX ORDER — CALCIUM CARBONATE 200(500)MG
500 TABLET,CHEWABLE ORAL 3 TIMES DAILY PRN
Status: DISCONTINUED | OUTPATIENT
Start: 2020-11-06 | End: 2020-11-08

## 2020-11-06 RX ORDER — CALCIUM GLUCONATE 20 MG/ML
1 INJECTION, SOLUTION INTRAVENOUS EVERY 10 MIN PRN
Status: DISCONTINUED | OUTPATIENT
Start: 2020-11-06 | End: 2020-11-11 | Stop reason: HOSPADM

## 2020-11-06 RX ORDER — HYDROCHLOROTHIAZIDE 25 MG/1
25 TABLET ORAL DAILY
Status: DISCONTINUED | OUTPATIENT
Start: 2020-11-06 | End: 2020-11-06

## 2020-11-06 RX ORDER — NIFEDIPINE 20 MG/1
20 CAPSULE ORAL ONCE
Status: COMPLETED | OUTPATIENT
Start: 2020-11-06 | End: 2020-11-06

## 2020-11-06 RX ORDER — LABETALOL HYDROCHLORIDE 5 MG/ML
80 INJECTION, SOLUTION INTRAVENOUS ONCE
Status: COMPLETED | OUTPATIENT
Start: 2020-11-06 | End: 2020-11-06

## 2020-11-06 RX ORDER — SODIUM BICARBONATE 650 MG/1
650 TABLET ORAL 2 TIMES DAILY
Status: DISCONTINUED | OUTPATIENT
Start: 2020-11-06 | End: 2020-11-08 | Stop reason: SDUPTHER

## 2020-11-06 RX ORDER — ONDANSETRON 8 MG/1
8 TABLET, ORALLY DISINTEGRATING ORAL EVERY 8 HOURS PRN
Status: DISCONTINUED | OUTPATIENT
Start: 2020-11-06 | End: 2020-11-08

## 2020-11-06 RX ORDER — SODIUM CHLORIDE 9 MG/ML
INJECTION, SOLUTION INTRAVENOUS CONTINUOUS
Status: DISCONTINUED | OUTPATIENT
Start: 2020-11-06 | End: 2020-11-07

## 2020-11-06 RX ORDER — ENOXAPARIN SODIUM 100 MG/ML
30 INJECTION SUBCUTANEOUS EVERY 24 HOURS
Status: DISCONTINUED | OUTPATIENT
Start: 2020-11-06 | End: 2020-11-06

## 2020-11-06 RX ORDER — LABETALOL HYDROCHLORIDE 5 MG/ML
20 INJECTION, SOLUTION INTRAVENOUS ONCE
Status: COMPLETED | OUTPATIENT
Start: 2020-11-06 | End: 2020-11-06

## 2020-11-06 RX ORDER — NIFEDIPINE 30 MG/1
60 TABLET, EXTENDED RELEASE ORAL DAILY
Status: DISCONTINUED | OUTPATIENT
Start: 2020-11-06 | End: 2020-11-06

## 2020-11-06 RX ORDER — NIFEDIPINE 30 MG/1
60 TABLET, EXTENDED RELEASE ORAL DAILY
Status: DISCONTINUED | OUTPATIENT
Start: 2020-11-07 | End: 2020-11-10

## 2020-11-06 RX ORDER — ALBUTEROL SULFATE 2.5 MG/.5ML
10 SOLUTION RESPIRATORY (INHALATION)
Status: COMPLETED | OUTPATIENT
Start: 2020-11-06 | End: 2020-11-06

## 2020-11-06 RX ORDER — HYDRALAZINE HYDROCHLORIDE 20 MG/ML
10 INJECTION INTRAMUSCULAR; INTRAVENOUS ONCE
Status: DISCONTINUED | OUTPATIENT
Start: 2020-11-06 | End: 2020-11-06

## 2020-11-06 RX ORDER — MAGNESIUM SULFATE HEPTAHYDRATE 40 MG/ML
4 INJECTION, SOLUTION INTRAVENOUS ONCE
Status: COMPLETED | OUTPATIENT
Start: 2020-11-06 | End: 2020-11-06

## 2020-11-06 RX ORDER — HYDRALAZINE HYDROCHLORIDE 20 MG/ML
10 INJECTION INTRAMUSCULAR; INTRAVENOUS ONCE
Status: COMPLETED | OUTPATIENT
Start: 2020-11-06 | End: 2020-11-06

## 2020-11-06 RX ORDER — CARVEDILOL 6.25 MG/1
25 TABLET ORAL 2 TIMES DAILY
Status: DISCONTINUED | OUTPATIENT
Start: 2020-11-06 | End: 2020-11-11 | Stop reason: HOSPADM

## 2020-11-06 RX ADMIN — NIFEDIPINE 10 MG: 10 CAPSULE ORAL at 12:11

## 2020-11-06 RX ADMIN — LABETALOL HYDROCHLORIDE 20 MG: 5 INJECTION INTRAVENOUS at 04:11

## 2020-11-06 RX ADMIN — CALCIUM GLUCONATE 1000 MG: 20 INJECTION, SOLUTION INTRAVENOUS at 03:11

## 2020-11-06 RX ADMIN — CARVEDILOL 25 MG: 12.5 TABLET, FILM COATED ORAL at 09:11

## 2020-11-06 RX ADMIN — SODIUM BICARBONATE 650 MG TABLET 650 MG: at 09:11

## 2020-11-06 RX ADMIN — ALBUTEROL SULFATE 10 MG: 2.5 SOLUTION RESPIRATORY (INHALATION) at 02:11

## 2020-11-06 RX ADMIN — LABETALOL HYDROCHLORIDE 40 MG: 5 INJECTION INTRAVENOUS at 04:11

## 2020-11-06 RX ADMIN — DEXTROSE, SODIUM CHLORIDE, SODIUM LACTATE, POTASSIUM CHLORIDE, AND CALCIUM CHLORIDE: 5; .6; .31; .03; .02 INJECTION, SOLUTION INTRAVENOUS at 09:11

## 2020-11-06 RX ADMIN — SODIUM CHLORIDE: 0.9 INJECTION, SOLUTION INTRAVENOUS at 09:11

## 2020-11-06 RX ADMIN — LABETALOL HYDROCHLORIDE 40 MG: 5 INJECTION INTRAVENOUS at 02:11

## 2020-11-06 RX ADMIN — DEXTROSE MONOHYDRATE 25 G: 25 INJECTION, SOLUTION INTRAVENOUS at 04:11

## 2020-11-06 RX ADMIN — MISOPROSTOL 50 MCG: 100 TABLET ORAL at 08:11

## 2020-11-06 RX ADMIN — LABETALOL HYDROCHLORIDE 80 MG: 5 INJECTION INTRAVENOUS at 02:11

## 2020-11-06 RX ADMIN — DEXTROSE MONOHYDRATE 12.5 G: 25 INJECTION, SOLUTION INTRAVENOUS at 05:11

## 2020-11-06 RX ADMIN — LABETALOL HYDROCHLORIDE 80 MG: 5 INJECTION INTRAVENOUS at 05:11

## 2020-11-06 RX ADMIN — SODIUM CHLORIDE, SODIUM LACTATE, POTASSIUM CHLORIDE, AND CALCIUM CHLORIDE: .6; .31; .03; .02 INJECTION, SOLUTION INTRAVENOUS at 02:11

## 2020-11-06 RX ADMIN — INSULIN HUMAN 7.34 UNITS: 100 INJECTION, SOLUTION PARENTERAL at 04:11

## 2020-11-06 RX ADMIN — HYDRALAZINE HYDROCHLORIDE 10 MG: 20 INJECTION INTRAMUSCULAR; INTRAVENOUS at 10:11

## 2020-11-06 RX ADMIN — LABETALOL HYDROCHLORIDE 20 MG: 5 INJECTION INTRAVENOUS at 01:11

## 2020-11-06 RX ADMIN — INSULIN HUMAN 0.6 UNITS/HR: 100 INJECTION, SOLUTION PARENTERAL at 10:11

## 2020-11-06 RX ADMIN — NIFEDIPINE 20 MG: 20 CAPSULE, LIQUID FILLED ORAL at 12:11

## 2020-11-06 RX ADMIN — NIFEDIPINE 30 MG: 30 TABLET, FILM COATED, EXTENDED RELEASE ORAL at 10:11

## 2020-11-06 RX ADMIN — HYDRALAZINE HYDROCHLORIDE 5 MG: 20 INJECTION INTRAMUSCULAR; INTRAVENOUS at 06:11

## 2020-11-06 RX ADMIN — MAGNESIUM SULFATE HEPTAHYDRATE 4 G: 40 INJECTION, SOLUTION INTRAVENOUS at 02:11
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
## 2020-11-06 NOTE — PROGRESS NOTES
Pt arrived to [facility] from [facility] clinic for elevated BP's.  Pt denies having epigastric pain or blurry vision at this time.  Pt reports on and off headaches but none at this time.  Pt oriented to room and Dr. Vásquez notified about arrival.

## 2020-11-06 NOTE — CONSULTS
Consult Note  Nephrology    Consult Requested By: Hilary Vásquez MD  Reason for Consult: CKD    SUBJECTIVE:     History of Present Illness:  Patient is a 25 y.o. female with T1DM (HgbA1C 10.2%), CKD Stage 4, HTN, admitted today with uncontrolled BPs and blood sugars.  She is followed by [facility] nephrologist Dr. Kruger.  She has been poorly compliant to home regimen of meds, per chart review.  She reports persistent BLE edema even prior to pregnancy.    Dr. Kruger has discussed with patient at length her advanced CKD and the potential need to start RRT.  She is followed by [facility] clinic with chronic medical conditions.  Her fetal growth is lagging and current fetus is non-viable.  She has been in discussions with OB team on delivery.    With concerns for pre-eclampsia, she has been hospitalized.  Labs on admission remarkable for hyperkalemia (K 6.2), acidosis (HCO3 18), GEORGE (Cr 3), urine p/c ratio 16.49, Mag boluses and insulin gtt started.      Past Medical History:   Diagnosis Date    Anemia     Chronic kidney disease     Diabetes mellitus     Hypertension     Nephrotic syndrome     Restrictive lung disease      No past surgical history on file.  Family History   Problem Relation Age of Onset    Heart disease Father     Diabetes Brother      Social History     Tobacco Use    Smoking status: Never Smoker    Smokeless tobacco: Never Used   Substance Use Topics    Alcohol use: No    Drug use: No       Review of patient's allergies indicates:  No Known Allergies     Review of Systems:  Constitutional: positive for anorexia and fatigue  Eyes: no visual changes  ENT: no nasal congestion or sore throat  Respiratory: no cough or shortness of breath  Cardiovascular: no chest pain or palpitations  Gastrointestinal: no nausea or vomiting, no abdominal pain or change in bowel habits  Musculoskeletal: no arthralgias or myalgias  Neurological: no seizures or tremors  Behavioral/Psych: no auditory or visual  hallucinations  Endocrine: no heat or cold intolerance     OBJECTIVE:     Vital Signs (Most Recent)  Temp: 97 °F (36.1 °C) (11/06/20 1154)  Pulse: 80 (11/06/20 1521)  Resp: 18 (11/06/20 1451)  BP: (!) 148/97(Dr. Vásquez notified) (11/06/20 1518)  SpO2: 100 % (11/06/20 1520)    Vital Signs Range (Last 24H):  Temp:  [97 °F (36.1 °C)]   Pulse:  [68-93]   Resp:  [16-18]   BP: (137-178)/()   SpO2:  [100 %]     Physical Exam:  Gen: AAOx3, NAD  HEENT: mmm, sclera anicteric  CV: RRR, no m/r  Resp: CTAB, no rales or wheezes  GI: soft, + gravid  Extr: 2+ BLE edema     carvediloL  12.5 mg Oral BID    citalopram  20 mg Oral Daily    [START ON 11/7/2020] hydroCHLOROthiazide  25 mg Oral Daily    insulin detemir U-100  20 Units Subcutaneous QHS    NIFEdipine  60 mg Oral Daily    oxytocin in lactated ringers  334 roque-units/min Intravenous Once    oxytocin in lactated ringers  95 roque-units/min Intravenous Once    sodium bicarbonate  650 mg Oral BID       Laboratory:    Recent Labs   Lab 11/03/20  1244 11/06/20  1215    132*   K 4.7 6.2*    107   CO2 19* 18*   BUN 39* 37*   CREATININE 2.6* 3.0*   CALCIUM 8.8 8.4*   PHOS 4.5  --      Recent Labs   Lab 11/03/20  1244 11/06/20  1215   WBC 8.90 8.82   HGB 9.3* 8.8*   HCT 29.1* 26.8*   * 404*     Diagnostic Results:  Labs: Reviewed    ASSESSMENT/PLAN:     CKD Stage 4/A3  - Progressive decline in renal function and with nephrotic range proteinuria, likely due to long history of uncontrolled HTN and DM.  - Kidney bx has not been done in the past and can consider after pregnancy.  - On Lovenox prophylaxis during pregnancy due to hypoalbuminemia and risk of thrombosis.  - She has normal LFTs and platelets are not low, making pre-eclampsia less likely at this time.  - She has been in discussion with OB team on delivery of non-viable fetus.    - She is at high risk of requiring RRT in the next 6 months if current renal trends continue.  - Stop  IVFs.    Hyperkalemia  - Medically treated in the ED.  - With control of hyperglycemia, potassium should also improve.  - Change to low K/diabetic diet.  - Repeat chemistries pending.  - Would avoid LR when hyperkalemic as it contains potassium.    Renal Hypertension  - Home regimen includes Coreg 12.5mg bid, HCTZ 25mg/d, Nifedipine 30mg/d.  - Increase Coreg to 25mg bid and increase Nifedipine to 60mg/d.  - Once non-viable fetus delivered, would diurese with Lasix 40mg IV daily.    T1DM with hyperglycemia  - HgbA1C 10.9%.  - Followed by Dr. Mayo at [facility].  - Insulin titration per MFM.    Anemia of Pregnancy with Iron deficiency  - Has received IV iron as outpatient.    Vitamin D deficiency  - On Ergocalciferol.    Chronic metabolic acidosis due to distal RTA from advanced CKD  - Has not been on bicarb tabs at home in recent weeks.  Resume home dose of 650mg bid.    D/W Dr. Vásquez.    Thank you for the consult.  We will continue to follow along with you.    Hany Liu MD  Nephrology

## 2020-11-06 NOTE — ASSESSMENT & PLAN NOTE
CKD stage 3, baseline Cr 2.2, up to 2.6 on Tuesday. Worsening proteinuria from baseline P:C ratio of 7.9 to 12.7. Per Nephrology (Dr. Kruger), pt is approaching need for dialysis.    Recommendations:  1. Continue follow up with Dr. Kruger.  2. Continue LMWH as written.  3. Multidisciplinary conference to review case to be arranged.

## 2020-11-06 NOTE — PROGRESS NOTES
Long discussion with patient and family about risk and benefit of continuing pregnancy versus moving forward with delivery due to severe pre-eclampsia in the previable period. Patient and family state that they understand the severity of Crystal's illness given the acutely worsening kidney function, severe range blood pressures, and severe FGR. We discussed that the fetus is growth restricted to the point that resuscitation would not be offered and we discussed that pre-eclampsia can become life threatening if untreated. The patient and her family agree to proceed with induction of labor. I informed the patient that the fetus could pass away during the induction process or postnatally and we discussed what to expect with the labor and delivery and postpartum process. Patient understands.  Karely Muse MD  Maternal Fetal Medicine fellow  PGY-6
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
## 2020-11-06 NOTE — HOSPITAL COURSE
2020: Patient admitted due to severe pre-e SI on cHTN with worsening renal function on admission to the hospital, new FGR (EFW 256g, 3SD less than mean for GA), and severe range blood pressures. Started on magnesium for seizure ppx due to severe pressures. IOL initiated due to concern for worsening maternal renal function in the setting of multiple medical comorbidities. Pt and family counseled extensively. Hyperkalemia corrected, pt continued on telemetry.  2020: IOL continued. Potassium levels and renal function as well as magnesium levels monitored. Mag sulfate continued via 4g bolus prn. Pt had  without complications, delivery of placenta immediately following fetus. Fetus noted to be nonviable at birth. Mirena placed immediately postpartum.  2020: Doing well this morning. Denies pain. Lochia is mild to moderate. Voiding spontaneously. Unsure she wants to talk to telepsychiatry or SW today. Recommended for psychiatric support. Family at bedside and have been supportive. No other complaints. Continue to monitor renal function/mag levels and maintain therapeutic mag level x 24h pp.  2020 - patient doing well this morning without complaints. Has decided to talk to telepsych. Electrolytes continue to trend in right direction. Creatinine up to 3.5 Patient asymptomatic. Voided 900 cc/overnight  11/10/2020 - Patient had telepsych visit yesterday and decision made to change out celexa for to lexapro. Trazadone added nightly PRN for insomnia. Labs continuing to improve. Detemir increased to 8/8. Asymptomatic. States the Trazadone really helped her sleep last night.   2020 - Patient with multiple elevated blood pressures yesterday requiring to be pushed on with IV Labetalol . Procardia increased to 90XL. Patient remained asymptomatic. Feels well this morning & would like to go home.     Creatinine with slight increase to 4.2 this morning. Spoke with nephrology and they recommended  titrating back down to 60XL Procardia. Repeat Creatinine this afternoon 4.1 so patient ok to discharge. Will follow up with nephrology.

## 2020-11-06 NOTE — ASSESSMENT & PLAN NOTE
FGR suspected on 10/15 scan, confirmed today with EFW (256g) lagging 1tUS dating by >3w with AC 4SD below mean for GA. UA dopplers are normal, though given baseline medical issues I strongly suspect uteroplacental disease. Only 69g increase over past 3.5 weeks.    Recommendations:  1. Weekly UA dopplers   2. At about 250g, fetus well below lower thresholds of successful resuscitation. Condition is guarded. Would defer delivery for fetal indications until EFW at least approaches 400g, which could be weeks based on current trajectory.  3. Defer  corticosteroids until fetal EFW approaches 400g.

## 2020-11-06 NOTE — ASSESSMENT & PLAN NOTE
Elevated BP today (160/98) with follow up 150/105. Denies symptoms of preeclampsia. Reports adherence to antihypertensive regimen.  Current regimen (11/6/20):  Carvedilol 12.5 mg BID, Nifedipine 30 mg daily, HCTZ 25 mg daily.    Recommendations:  1. To [facility] for further assessment including preE labs and potential need for uptitration of antihypertensive regimen.

## 2020-11-07 PROBLEM — Z34.90 ENCOUNTER FOR INDUCTION OF LABOR: Status: ACTIVE | Noted: 2020-11-07

## 2020-11-07 PROBLEM — F41.9 ANXIETY: Status: ACTIVE | Noted: 2020-11-07

## 2020-11-07 PROBLEM — E87.5 HYPERKALEMIA: Status: ACTIVE | Noted: 2020-11-07

## 2020-11-07 LAB
ABO + RH BLD: NORMAL
ALBUMIN SERPL BCP-MCNC: 1.5 G/DL (ref 3.5–5.2)
ALBUMIN SERPL BCP-MCNC: 1.5 G/DL (ref 3.5–5.2)
ALBUMIN SERPL BCP-MCNC: 1.6 G/DL (ref 3.5–5.2)
ALBUMIN SERPL BCP-MCNC: 1.7 G/DL (ref 3.5–5.2)
ALP SERPL-CCNC: 49 U/L (ref 55–135)
ALP SERPL-CCNC: 55 U/L (ref 55–135)
ALP SERPL-CCNC: 56 U/L (ref 55–135)
ALP SERPL-CCNC: 57 U/L (ref 55–135)
ALP SERPL-CCNC: 57 U/L (ref 55–135)
ALP SERPL-CCNC: 59 U/L (ref 55–135)
ALT SERPL W/O P-5'-P-CCNC: 17 U/L (ref 10–44)
ALT SERPL W/O P-5'-P-CCNC: 18 U/L (ref 10–44)
ALT SERPL W/O P-5'-P-CCNC: 18 U/L (ref 10–44)
ALT SERPL W/O P-5'-P-CCNC: 20 U/L (ref 10–44)
ALT SERPL W/O P-5'-P-CCNC: 20 U/L (ref 10–44)
ALT SERPL W/O P-5'-P-CCNC: 21 U/L (ref 10–44)
ANION GAP SERPL CALC-SCNC: 10 MMOL/L (ref 8–16)
ANION GAP SERPL CALC-SCNC: 6 MMOL/L (ref 8–16)
ANION GAP SERPL CALC-SCNC: 6 MMOL/L (ref 8–16)
ANION GAP SERPL CALC-SCNC: 8 MMOL/L (ref 8–16)
ANION GAP SERPL CALC-SCNC: 9 MMOL/L (ref 8–16)
ANION GAP SERPL CALC-SCNC: 9 MMOL/L (ref 8–16)
AST SERPL-CCNC: 26 U/L (ref 10–40)
AST SERPL-CCNC: 28 U/L (ref 10–40)
AST SERPL-CCNC: 30 U/L (ref 10–40)
AST SERPL-CCNC: 32 U/L (ref 10–40)
AST SERPL-CCNC: 32 U/L (ref 10–40)
AST SERPL-CCNC: 34 U/L (ref 10–40)
BILIRUB SERPL-MCNC: 0.1 MG/DL (ref 0.1–1)
BILIRUB SERPL-MCNC: 0.2 MG/DL (ref 0.1–1)
BLD GP AB SCN CELLS X3 SERPL QL: NORMAL
BUN SERPL-MCNC: 36 MG/DL (ref 6–20)
BUN SERPL-MCNC: 37 MG/DL (ref 6–20)
CALCIUM SERPL-MCNC: 8 MG/DL (ref 8.7–10.5)
CALCIUM SERPL-MCNC: 8.2 MG/DL (ref 8.7–10.5)
CALCIUM SERPL-MCNC: 8.3 MG/DL (ref 8.7–10.5)
CALCIUM SERPL-MCNC: 8.3 MG/DL (ref 8.7–10.5)
CALCIUM SERPL-MCNC: 8.4 MG/DL (ref 8.7–10.5)
CALCIUM SERPL-MCNC: 8.5 MG/DL (ref 8.7–10.5)
CHLORIDE SERPL-SCNC: 106 MMOL/L (ref 95–110)
CHLORIDE SERPL-SCNC: 108 MMOL/L (ref 95–110)
CHLORIDE SERPL-SCNC: 109 MMOL/L (ref 95–110)
CHLORIDE SERPL-SCNC: 109 MMOL/L (ref 95–110)
CHLORIDE SERPL-SCNC: 110 MMOL/L (ref 95–110)
CHLORIDE SERPL-SCNC: 110 MMOL/L (ref 95–110)
CO2 SERPL-SCNC: 14 MMOL/L (ref 23–29)
CO2 SERPL-SCNC: 17 MMOL/L (ref 23–29)
CO2 SERPL-SCNC: 18 MMOL/L (ref 23–29)
CO2 SERPL-SCNC: 19 MMOL/L (ref 23–29)
CREAT SERPL-MCNC: 2.8 MG/DL (ref 0.5–1.4)
CREAT SERPL-MCNC: 2.9 MG/DL (ref 0.5–1.4)
CREAT SERPL-MCNC: 2.9 MG/DL (ref 0.5–1.4)
CREAT SERPL-MCNC: 3 MG/DL (ref 0.5–1.4)
EST. GFR  (AFRICAN AMERICAN): 24 ML/MIN/1.73 M^2
EST. GFR  (AFRICAN AMERICAN): 25 ML/MIN/1.73 M^2
EST. GFR  (AFRICAN AMERICAN): 25 ML/MIN/1.73 M^2
EST. GFR  (AFRICAN AMERICAN): 26 ML/MIN/1.73 M^2
EST. GFR  (NON AFRICAN AMERICAN): 21 ML/MIN/1.73 M^2
EST. GFR  (NON AFRICAN AMERICAN): 22 ML/MIN/1.73 M^2
EST. GFR  (NON AFRICAN AMERICAN): 22 ML/MIN/1.73 M^2
EST. GFR  (NON AFRICAN AMERICAN): 23 ML/MIN/1.73 M^2
GLUCOSE SERPL-MCNC: 61 MG/DL (ref 70–110)
GLUCOSE SERPL-MCNC: 63 MG/DL (ref 70–110)
GLUCOSE SERPL-MCNC: 68 MG/DL (ref 70–110)
GLUCOSE SERPL-MCNC: 71 MG/DL (ref 70–110)
GLUCOSE SERPL-MCNC: 76 MG/DL (ref 70–110)
GLUCOSE SERPL-MCNC: 90 MG/DL (ref 70–110)
MAGNESIUM SERPL-MCNC: 2.9 MG/DL (ref 1.6–2.6)
MAGNESIUM SERPL-MCNC: 3.7 MG/DL (ref 1.6–2.6)
MAGNESIUM SERPL-MCNC: 3.8 MG/DL (ref 1.6–2.6)
MAGNESIUM SERPL-MCNC: 4 MG/DL (ref 1.6–2.6)
MAGNESIUM SERPL-MCNC: 4.5 MG/DL (ref 1.6–2.6)
MAGNESIUM SERPL-MCNC: 4.6 MG/DL (ref 1.6–2.6)
POCT GLUCOSE: 105 MG/DL (ref 70–110)
POCT GLUCOSE: 108 MG/DL (ref 70–110)
POCT GLUCOSE: 110 MG/DL (ref 70–110)
POCT GLUCOSE: 120 MG/DL (ref 70–110)
POCT GLUCOSE: 41 MG/DL (ref 70–110)
POCT GLUCOSE: 53 MG/DL (ref 70–110)
POCT GLUCOSE: 56 MG/DL (ref 70–110)
POCT GLUCOSE: 62 MG/DL (ref 70–110)
POCT GLUCOSE: 64 MG/DL (ref 70–110)
POCT GLUCOSE: 64 MG/DL (ref 70–110)
POCT GLUCOSE: 66 MG/DL (ref 70–110)
POCT GLUCOSE: 68 MG/DL (ref 70–110)
POCT GLUCOSE: 69 MG/DL (ref 70–110)
POCT GLUCOSE: 71 MG/DL (ref 70–110)
POCT GLUCOSE: 74 MG/DL (ref 70–110)
POCT GLUCOSE: 77 MG/DL (ref 70–110)
POCT GLUCOSE: 77 MG/DL (ref 70–110)
POCT GLUCOSE: 80 MG/DL (ref 70–110)
POCT GLUCOSE: 81 MG/DL (ref 70–110)
POCT GLUCOSE: 82 MG/DL (ref 70–110)
POCT GLUCOSE: 86 MG/DL (ref 70–110)
POCT GLUCOSE: 87 MG/DL (ref 70–110)
POCT GLUCOSE: 95 MG/DL (ref 70–110)
POCT GLUCOSE: 97 MG/DL (ref 70–110)
POCT GLUCOSE: 98 MG/DL (ref 70–110)
POTASSIUM SERPL-SCNC: 4.8 MMOL/L (ref 3.5–5.1)
POTASSIUM SERPL-SCNC: 4.9 MMOL/L (ref 3.5–5.1)
POTASSIUM SERPL-SCNC: 5 MMOL/L (ref 3.5–5.1)
POTASSIUM SERPL-SCNC: 5 MMOL/L (ref 3.5–5.1)
POTASSIUM SERPL-SCNC: 5.2 MMOL/L (ref 3.5–5.1)
PROT SERPL-MCNC: 5.2 G/DL (ref 6–8.4)
PROT SERPL-MCNC: 5.4 G/DL (ref 6–8.4)
PROT SERPL-MCNC: 5.5 G/DL (ref 6–8.4)
PROT SERPL-MCNC: 5.7 G/DL (ref 6–8.4)
SODIUM SERPL-SCNC: 129 MMOL/L (ref 136–145)
SODIUM SERPL-SCNC: 134 MMOL/L (ref 136–145)
SODIUM SERPL-SCNC: 135 MMOL/L (ref 136–145)
SODIUM SERPL-SCNC: 136 MMOL/L (ref 136–145)

## 2020-11-07 PROCEDURE — 88305 TISSUE EXAM BY PATHOLOGIST: ICD-10-PCS | Mod: 26,,, | Performed by: PATHOLOGY

## 2020-11-07 PROCEDURE — 99232 SBSQ HOSP IP/OBS MODERATE 35: CPT | Mod: ,,, | Performed by: OBSTETRICS & GYNECOLOGY

## 2020-11-07 PROCEDURE — 94761 N-INVAS EAR/PLS OXIMETRY MLT: CPT

## 2020-11-07 PROCEDURE — 99232 PR SUBSEQUENT HOSPITAL CARE,LEVL II: ICD-10-PCS | Mod: ,,, | Performed by: OBSTETRICS & GYNECOLOGY

## 2020-11-07 PROCEDURE — 93010 ELECTROCARDIOGRAM REPORT: CPT | Mod: ,,, | Performed by: INTERNAL MEDICINE

## 2020-11-07 PROCEDURE — 86920 COMPATIBILITY TEST SPIN: CPT

## 2020-11-07 PROCEDURE — 99900035 HC TECH TIME PER 15 MIN (STAT)

## 2020-11-07 PROCEDURE — 25000003 PHARM REV CODE 250: Performed by: STUDENT IN AN ORGANIZED HEALTH CARE EDUCATION/TRAINING PROGRAM

## 2020-11-07 PROCEDURE — 88305 TISSUE EXAM BY PATHOLOGIST: CPT | Mod: 26,,, | Performed by: PATHOLOGY

## 2020-11-07 PROCEDURE — 93005 ELECTROCARDIOGRAM TRACING: CPT

## 2020-11-07 PROCEDURE — 94644 CONT INHLJ TX 1ST HOUR: CPT

## 2020-11-07 PROCEDURE — 83735 ASSAY OF MAGNESIUM: CPT | Mod: 91

## 2020-11-07 PROCEDURE — 80053 COMPREHEN METABOLIC PANEL: CPT | Mod: 91

## 2020-11-07 PROCEDURE — 11000001 HC ACUTE MED/SURG PRIVATE ROOM

## 2020-11-07 PROCEDURE — 86901 BLOOD TYPING SEROLOGIC RH(D): CPT

## 2020-11-07 PROCEDURE — 63600175 PHARM REV CODE 636 W HCPCS

## 2020-11-07 PROCEDURE — 83735 ASSAY OF MAGNESIUM: CPT

## 2020-11-07 PROCEDURE — 59409 OBSTETRICAL CARE: CPT | Mod: QY,,, | Performed by: ANESTHESIOLOGY

## 2020-11-07 PROCEDURE — 93041 RHYTHM ECG TRACING: CPT

## 2020-11-07 PROCEDURE — 84132 ASSAY OF SERUM POTASSIUM: CPT

## 2020-11-07 PROCEDURE — 72200004 HC VAGINAL DELIVERY LEVEL I

## 2020-11-07 PROCEDURE — 62326 NJX INTERLAMINAR LMBR/SAC: CPT | Performed by: STUDENT IN AN ORGANIZED HEALTH CARE EDUCATION/TRAINING PROGRAM

## 2020-11-07 PROCEDURE — C1751 CATH, INF, PER/CENT/MIDLINE: HCPCS | Performed by: ANESTHESIOLOGY

## 2020-11-07 PROCEDURE — 27200710 HC EPIDURAL INFUSION PUMP SET: Performed by: ANESTHESIOLOGY

## 2020-11-07 PROCEDURE — 63600175 PHARM REV CODE 636 W HCPCS: Performed by: STUDENT IN AN ORGANIZED HEALTH CARE EDUCATION/TRAINING PROGRAM

## 2020-11-07 PROCEDURE — 93010 EKG 12-LEAD: ICD-10-PCS | Mod: 76,,, | Performed by: INTERNAL MEDICINE

## 2020-11-07 PROCEDURE — 93010 EKG 12-LEAD: ICD-10-PCS | Mod: ,,, | Performed by: INTERNAL MEDICINE

## 2020-11-07 PROCEDURE — 59409 PRA ETRICAL CARE,VAG DELIV ONLY: ICD-10-PCS | Mod: QY,,, | Performed by: ANESTHESIOLOGY

## 2020-11-07 PROCEDURE — 36415 COLL VENOUS BLD VENIPUNCTURE: CPT

## 2020-11-07 PROCEDURE — 25000003 PHARM REV CODE 250: Performed by: INTERNAL MEDICINE

## 2020-11-07 PROCEDURE — 25000003 PHARM REV CODE 250

## 2020-11-07 PROCEDURE — 25000242 PHARM REV CODE 250 ALT 637 W/ HCPCS: Performed by: STUDENT IN AN ORGANIZED HEALTH CARE EDUCATION/TRAINING PROGRAM

## 2020-11-07 PROCEDURE — 88305 TISSUE EXAM BY PATHOLOGIST: CPT | Performed by: PATHOLOGY

## 2020-11-07 RX ORDER — TRANEXAMIC ACID 100 MG/ML
INJECTION, SOLUTION INTRAVENOUS
Status: DISCONTINUED
Start: 2020-11-07 | End: 2020-11-08 | Stop reason: WASHOUT

## 2020-11-07 RX ORDER — OXYTOCIN 10 [USP'U]/ML
INJECTION, SOLUTION INTRAMUSCULAR; INTRAVENOUS
Status: COMPLETED
Start: 2020-11-07 | End: 2020-11-07

## 2020-11-07 RX ORDER — MISOPROSTOL 200 UG/1
400 TABLET ORAL ONCE
Status: COMPLETED | OUTPATIENT
Start: 2020-11-07 | End: 2020-11-07

## 2020-11-07 RX ORDER — MISOPROSTOL 200 UG/1
TABLET ORAL
Status: COMPLETED
Start: 2020-11-07 | End: 2020-11-07

## 2020-11-07 RX ORDER — FENTANYL/BUPIVACAINE/NS/PF 2MCG/ML-.1
PLASTIC BAG, INJECTION (ML) INJECTION
Status: COMPLETED
Start: 2020-11-07 | End: 2020-11-07

## 2020-11-07 RX ORDER — FENTANYL/BUPIVACAINE/NS/PF 2MCG/ML-.1
PLASTIC BAG, INJECTION (ML) INJECTION CONTINUOUS
Status: DISCONTINUED | OUTPATIENT
Start: 2020-11-07 | End: 2020-11-08

## 2020-11-07 RX ORDER — MAGNESIUM SULFATE HEPTAHYDRATE 40 MG/ML
2 INJECTION, SOLUTION INTRAVENOUS ONCE
Status: DISCONTINUED | OUTPATIENT
Start: 2020-11-07 | End: 2020-11-07

## 2020-11-07 RX ORDER — DEXTROSE MONOHYDRATE AND SODIUM CHLORIDE 5; .9 G/100ML; G/100ML
INJECTION, SOLUTION INTRAVENOUS CONTINUOUS
Status: DISCONTINUED | OUTPATIENT
Start: 2020-11-07 | End: 2020-11-08

## 2020-11-07 RX ORDER — ALBUTEROL SULFATE 2.5 MG/.5ML
10 SOLUTION RESPIRATORY (INHALATION) ONCE
Status: COMPLETED | OUTPATIENT
Start: 2020-11-07 | End: 2020-11-07

## 2020-11-07 RX ORDER — MAGNESIUM SULFATE HEPTAHYDRATE 40 MG/ML
4 INJECTION, SOLUTION INTRAVENOUS ONCE
Status: COMPLETED | OUTPATIENT
Start: 2020-11-07 | End: 2020-11-07

## 2020-11-07 RX ORDER — MISOPROSTOL 200 UG/1
200 TABLET ORAL ONCE
Status: COMPLETED | OUTPATIENT
Start: 2020-11-07 | End: 2020-11-07

## 2020-11-07 RX ORDER — FAMOTIDINE 10 MG/ML
20 INJECTION INTRAVENOUS ONCE
Status: DISCONTINUED | OUTPATIENT
Start: 2020-11-07 | End: 2020-11-08

## 2020-11-07 RX ORDER — FENTANYL/BUPIVACAINE/NS/PF 2MCG/ML-.1
PLASTIC BAG, INJECTION (ML) INJECTION CONTINUOUS PRN
Status: DISCONTINUED | OUTPATIENT
Start: 2020-11-07 | End: 2020-11-08

## 2020-11-07 RX ORDER — METHYLERGONOVINE MALEATE 0.2 MG/ML
INJECTION INTRAVENOUS
Status: DISCONTINUED
Start: 2020-11-07 | End: 2020-11-08 | Stop reason: WASHOUT

## 2020-11-07 RX ADMIN — MISOPROSTOL 400 MCG: 200 TABLET ORAL at 01:11

## 2020-11-07 RX ADMIN — DEXTROSE MONOHYDRATE 12.5 G: 25 INJECTION, SOLUTION INTRAVENOUS at 08:11

## 2020-11-07 RX ADMIN — MISOPROSTOL 200 MCG: 200 TABLET ORAL at 05:11

## 2020-11-07 RX ADMIN — MISOPROSTOL 50 MCG: 100 TABLET ORAL at 12:11

## 2020-11-07 RX ADMIN — INSULIN HUMAN 0.6 UNITS/HR: 100 INJECTION, SOLUTION PARENTERAL at 07:11

## 2020-11-07 RX ADMIN — SODIUM BICARBONATE 650 MG TABLET 650 MG: at 10:11

## 2020-11-07 RX ADMIN — NIFEDIPINE 60 MG: 30 TABLET, FILM COATED, EXTENDED RELEASE ORAL at 06:11

## 2020-11-07 RX ADMIN — INSULIN HUMAN 1.8 UNITS/HR: 100 INJECTION, SOLUTION PARENTERAL at 05:11

## 2020-11-07 RX ADMIN — DEXTROSE MONOHYDRATE 12.5 G: 25 INJECTION, SOLUTION INTRAVENOUS at 04:11

## 2020-11-07 RX ADMIN — CITALOPRAM HYDROBROMIDE 20 MG: 20 TABLET ORAL at 10:11

## 2020-11-07 RX ADMIN — MISOPROSTOL 400 MCG: 200 TABLET ORAL at 10:11

## 2020-11-07 RX ADMIN — ALBUTEROL SULFATE 10 MG: 2.5 SOLUTION RESPIRATORY (INHALATION) at 04:11

## 2020-11-07 RX ADMIN — INSULIN HUMAN 1.2 UNITS/HR: 100 INJECTION, SOLUTION PARENTERAL at 08:11

## 2020-11-07 RX ADMIN — INSULIN HUMAN 1.2 UNITS/HR: 100 INJECTION, SOLUTION PARENTERAL at 06:11

## 2020-11-07 RX ADMIN — CARVEDILOL 25 MG: 12.5 TABLET, FILM COATED ORAL at 09:11

## 2020-11-07 RX ADMIN — INSULIN HUMAN 0.6 UNITS/HR: 100 INJECTION, SOLUTION PARENTERAL at 10:11

## 2020-11-07 RX ADMIN — MISOPROSTOL 400 MCG: 200 TABLET ORAL at 09:11

## 2020-11-07 RX ADMIN — INSULIN HUMAN 0.6 UNITS/HR: 100 INJECTION, SOLUTION PARENTERAL at 01:11

## 2020-11-07 RX ADMIN — DEXTROSE AND SODIUM CHLORIDE: 5; .9 INJECTION, SOLUTION INTRAVENOUS at 10:11

## 2020-11-07 RX ADMIN — DEXTROSE, SODIUM CHLORIDE, SODIUM LACTATE, POTASSIUM CHLORIDE, AND CALCIUM CHLORIDE: 5; .6; .31; .03; .02 INJECTION, SOLUTION INTRAVENOUS at 10:11

## 2020-11-07 RX ADMIN — MAGNESIUM SULFATE HEPTAHYDRATE 4 G: 40 INJECTION, SOLUTION INTRAVENOUS at 02:11

## 2020-11-07 RX ADMIN — HYDROCHLOROTHIAZIDE 25 MG: 25 TABLET ORAL at 10:11

## 2020-11-07 RX ADMIN — DEXTROSE, SODIUM CHLORIDE, SODIUM LACTATE, POTASSIUM CHLORIDE, AND CALCIUM CHLORIDE: 5; .6; .31; .03; .02 INJECTION, SOLUTION INTRAVENOUS at 07:11

## 2020-11-07 RX ADMIN — Medication 10 ML/HR: at 02:11

## 2020-11-07 RX ADMIN — MISOPROSTOL 400 MCG: 200 TABLET ORAL at 06:11

## 2020-11-07 RX ADMIN — INSULIN HUMAN 1.2 UNITS/HR: 100 INJECTION, SOLUTION PARENTERAL at 01:11

## 2020-11-07 RX ADMIN — LEVONORGESTREL 1 INTRA UTERINE DEVICE: 52 INTRAUTERINE DEVICE INTRAUTERINE at 11:11

## 2020-11-07 RX ADMIN — OXYTOCIN 10 UNITS: 10 INJECTION, SOLUTION INTRAMUSCULAR; INTRAVENOUS at 11:11

## 2020-11-07 NOTE — SUBJECTIVE & OBJECTIVE
Interval HPI   No complaints this morning. Denies headache, vision changes, chest pain, SOB, RUQ pain.    Objective:     Vital Signs (Most Recent):  Temp: 98.2 °F (36.8 °C) (11/07/20 0709)  Pulse: 91 (11/07/20 0927)  Resp: (!) 22 (11/07/20 0730)  BP: 123/68 (11/07/20 0924)  SpO2: 100 % (11/07/20 0925) Vital Signs (24h Range):  Temp:  [97 °F (36.1 °C)-98.6 °F (37 °C)] 98.2 °F (36.8 °C)  Pulse:  [68-96] 91  Resp:  [16-24] 22  SpO2:  [98 %-100 %] 100 %  BP: (115-178)/() 123/68     Weight: 73 kg (160 lb 15 oz)  Body mass index is 27.62 kg/m².      Intake/Output Summary (Last 24 hours) at 11/7/2020 1023  Last data filed at 11/7/2020 0907  Gross per 24 hour   Intake 1515.79 ml   Output 900 ml   Net 615.79 ml       Cervical Exam:  Dilation:  fingertip  Effacement:  0%  Station: -3  Presentation: Vertex     Significant Labs:  Recent Labs   Lab 11/03/20  1244 11/06/20  1215   WBC 8.90 8.82   HGB 9.3* 8.8*   HCT 29.1* 26.8*   MCV 93 89   * 404*      Recent Labs   Lab 11/03/20  1244  11/06/20 2001 11/07/20  0027 11/07/20  0459      < > 135* 135* 136   K 4.7   < > 5.0 4.8 4.8      < > 109 109 110   CO2 19*   < > 18* 18* 17*   BUN 39*   < > 38* 37* 37*   CREATININE 2.6*   < > 2.8* 2.8* 2.8*   *   < > 81 71 90   PROT  --    < > 5.8* 5.4* 5.2*   BILITOT  --    < > 0.2 0.2 0.2   ALKPHOS  --    < > 60 57 55   ALT  --    < > 18 18 17   AST  --    < > 29 26 28   MG  --   --  3.2* 2.9* 4.0*   PHOS 4.5  --   --   --   --     < > = values in this interval not displayed.          Physical Exam:   Constitutional: She is oriented to person, place, and time. She appears well-developed and well-nourished. No distress (cramping intermittently, otherwise no complaints).    HENT:   Head: Normocephalic and atraumatic.    Eyes: EOM are normal.    Neck: Normal range of motion.    Cardiovascular: Normal rate and regular rhythm.     Pulmonary/Chest: Effort normal and breath sounds normal. No respiratory distress. She  has no wheezes. She has no rales.        Abdominal: Soft. There is no abdominal tenderness.     Genitourinary:    Genitourinary Comments: SVE: fingertip/th/hi. 400mcg vaginal cytotec placed.                 Neurological: She is alert and oriented to person, place, and time.    Skin: Skin is warm and dry. She is not diaphoretic.    Psychiatric: She has a normal mood and affect. Her behavior is normal.

## 2020-11-07 NOTE — ASSESSMENT & PLAN NOTE
- pt has h/o restrictive lung disease; PFTs early this pregnancy were wnl  - OK to use hemabate in the setting of PPH

## 2020-11-07 NOTE — ASSESSMENT & PLAN NOTE
- Takes Lantus 20 u QHS  - Carb ratio: 1/9, 1/8, 1/9 for B/L/D respectively  - In setting of induction, on Insulin gtt   - POCT glucose 260 on admission, has improved since starting insulin drip  - Beta-hydroxybutyrate on admission was 0.1

## 2020-11-07 NOTE — ASSESSMENT & PLAN NOTE
- CKD IIIB - IV  - Baseline Creatinine 2.0  - Increased Creatinine to 3.0 consistent with worsening renal function in setting of PreE w/ SF  - Severe proteinuria. On Lovenox. Will hold in setting of induction.   - Nephrology consulted, appreciate recs   - rec d/c IVF, and avoiding LR when hyperkalemic - pt on minimal IVF with D5, insulin, magnesium boluses   - Lasix 40 daily postpartum   - Bicarb 650 BID  - pt may need RRT, will need to f/u with her nephrologist pp

## 2020-11-07 NOTE — PROGRESS NOTES
Patient seen and examined. Feeling well, reports some cramps but no other pain.  Denies headache, blurry vision, CP, SOB, RUQ pain.     Temp:  [97 °F (36.1 °C)-98.6 °F (37 °C)] 98.2 °F (36.8 °C)  Pulse:  [68-96] 94  Resp:  [16-24] 22  SpO2:  [99 %-100 %] 100 %  BP: (115-178)/() 146/90    I/O: net +800cc   cc overnight (0.7cc/kg/hr)    Mg 4.0   CMP reviewed    G1 at 23w2d with severe preE in the context of CHTN, T1DM, CKD stage IV. Admitted for previable induction due to severe preE.   - continue cytotec, will increase to 400mcg PV q4  - Mag level therapeutic. Continue q4 Mg levels with CMP. Bolus if subtherapeutic. No s/s mag toxicity. UOP adequate overnight.  - BPs mild range after multiple IV antihypertensives. Continue to push PRN. Continue PO antihypertensives.  - Nephrology following, appreciate recs  - Continue insulin gtt  - repeat EKG this morning to confirm resolution of peaked T waves now that K is WNL. Continue telemetry to monitor for arrhythmia.   - Lasix PRN diuresis although not necessary at this time. Will limit IV fluids to total 50-75cc/hr    Discussed plan with patient and mother, who agree.    Karely Muse MD  Maternal Fetal Medicine fellow  PGY-6

## 2020-11-07 NOTE — NURSING
Dr Uribe notified repeat glu 120 after 12.5 g D50 and 150 mL/hr D5NS infusion. See MAR for rate change on D5NS per algorithm. Dr Uribe okay to recheck @ 1700 and restart insulin gtt as needed at that time.

## 2020-11-07 NOTE — PROGRESS NOTES
Progress Note  Nephrology    Admit Date: 11/6/2020   LOS: 1 day     SUBJECTIVE:     Follow-up For: GEORGE on CKD Stage 4    Interval History: Induction of labor is planned today.  BPs controlled.  Recorded UOP 1.4L.  Mother at the bedside.    OBJECTIVE:     Vital Signs (Most Recent)  Temp: 98.6 °F (37 °C) (11/07/20 1156)  Pulse: 77 (11/07/20 1433)  Resp: 20 (11/07/20 1156)  BP: 116/63 (11/07/20 1433)  SpO2: 99 % (11/07/20 1430)    Vital Signs Range (Last 24H):  Temp:  [98.2 °F (36.8 °C)-98.6 °F (37 °C)]   Pulse:  [76-96]   Resp:  [16-24]   BP: (103-175)/(58-97)   SpO2:  [97 %-100 %]     Review of Systems:   Constitutional: positive for anorexia and fatigue  Eyes: no visual changes  ENT: no nasal congestion or sore throat  Respiratory: no cough or shortness of breath  Cardiovascular: no chest pain or palpitations  Gastrointestinal: no nausea or vomiting, no abdominal pain or change in bowel habits  Musculoskeletal: no arthralgias or myalgias  Neurological: no seizures or tremors  Behavioral/Psych: no auditory or visual hallucinations  Endocrine: no heat or cold intolerance     Physical Exam:  Gen: AAOx3, NAD  HEENT: mmm, sclera anicteric  CV: RRR, no m/r  Resp: CTAB, no rales or wheezes  GI: soft, + gravid  Extr: 2+ BLE edema    Laboratory:  Recent Labs   Lab 11/03/20  1244  11/07/20  0459 11/07/20  0924 11/07/20  1204      < > 136 134* 134*   K 4.7   < > 4.8 5.2* 5.0      < > 110 110 109   CO2 19*   < > 17* 14* 19*   BUN 39*   < > 37* 36* 36*   CREATININE 2.6*   < > 2.8* 2.9* 2.8*   CALCIUM 8.8   < > 8.4* 8.5* 8.3*   PHOS 4.5  --   --   --   --     < > = values in this interval not displayed.     Recent Labs   Lab 11/03/20  1244 11/06/20  1215   WBC 8.90 8.82   HGB 9.3* 8.8*   HCT 29.1* 26.8*   * 404*        Diagnostic Results:  Labs: Reviewed  US: Reviewed    ASSESSMENT/PLAN:     CKD Stage 4/A3  - Progressive decline in renal function and with nephrotic range proteinuria, likely due to long history  of uncontrolled HTN and DM.  - Kidney bx has not been done in the past and can consider after pregnancy.  - On Lovenox prophylaxis during pregnancy due to hypoalbuminemia and risk of thrombosis.  - Induction of labor is planned today.  - She is at high risk of requiring RRT in the next 6 months if current renal trends continue.  - Stopped IVFs.     Hyperkalemia  - Medically treated in the ED.  - With control of hyperglycemia, potassium should also improve.  - Change to low K/diabetic diet.  - Would avoid LR when hyperkalemic as it contains potassium.     Renal Hypertension  - Home regimen includes Coreg 12.5mg bid, HCTZ 25mg/d, Nifedipine 30mg/d.  - Increased Coreg to 25mg bid and increased Nifedipine to 60mg/d.  - Once non-viable fetus delivered, would diurese with Lasix 40mg IV daily.     T1DM with hyperglycemia  - HgbA1C 10.9%.  - Followed by Dr. Mayo at [facility] [place].  - Insulin titration per MFM. On insulin gtt.     Anemia of Pregnancy with Iron deficiency  - Has received IV iron as outpatient.     Vitamin D deficiency  - On Ergocalciferol.     Chronic metabolic acidosis due to distal RTA from advanced CKD  - Has not been on bicarb tabs at home in recent weeks.  Resume home dose of 650mg bid.    Thank you for allowing me to participate in the care of this patient.      Hany Liu MD  Nephrology

## 2020-11-07 NOTE — PROGRESS NOTES
"LABOR NOTE    S:  Complaints: Yes - cramping.  Epidural working:  not applicable  MD to bedside for cervical check/cytotec placement    O: /65 (BP Location: Left arm, Patient Position: Lying)   Pulse 88   Temp 98.6 °F (37 °C) (Oral)   Resp 20   Ht 5' 4" (1.626 m)   Wt 73 kg (160 lb 15 oz)   LMP  (LMP Unknown) Comment: pt. states last cycle was in May. She is not sure of the exact date.  SpO2 98%   Breastfeeding No   BMI 27.62 kg/m²     SVE: fingertip/thick/high      ASSESSMENT:   25 y.o.  at 23w2d, IOL for pre-eclampsia w/ SF/worsening renal function/multiple maternal medical comorbidities      Active Hospital Problems    Diagnosis  POA    *Severe pre-eclampsia in second trimester [O14.12]  Unknown    Hyperkalemia [E87.5]  Unknown    Anxiety [F41.9]  Unknown    Encounter for induction of labor [Z34.90]  Not Applicable    Poor fetal growth affecting management of mother in second trimester [O36.5920]  Yes    Restrictive lung disease [J98.4]  Yes     #needs to see pulmonology as outpatient      Renal disease [N28.9]  Yes     Baseline creatinine 2.2  Defer to nephrology for any other management of CKD.  Recommend 24 hour urine for protein and creatinine clearance and consideration of renal ultrasound.      HTN (hypertension) [I10]  Yes     Currently on HCTZ 25 mg, losartan 100 mg and toprol 50 mg XR  At NOB visit, we discussed increasing HCTZ to 50 mg daily, continuing toprol and stopping the losartan  #Baseline labs, EKG ordered  #Had prior cardiology appointment that was missed with echo, needs to be rescheduled  #ASA at 12-16 weeks        Type 1 diabetes mellitus with diabetic chronic kidney disease [E10.22]  Yes     Followed by Dr. Mayo.   Last A1C was 13   Currently on lantus 20 units qAM and humolog 10 units with meals    - a fetal echocardiogram around 22-24 weeks  - needs eye exam, podiatry exam   - needs EKG, maternal echo and fu with cardiology   - ASA 81mg, folic acid 4mg PO " daily  - hemoglobin A1c every 4-6 weeks  -24 hour urine for protein and creatinine clearance should be performed. Patient will also need a monthly assessment of CMP/CBC and urine culture with her primary ob.                Resolved Hospital Problems   No resolved problems to display.         PLAN:    IOL  - Fingertip/th/hi  - Cytotec 400mcg PV placed  - Pt wants epidural, anesthesia being called now    Pre-E w/ SF  - most recent magnesium levels < 4  - 4g mg sulfate bolus ordered  - will recheck magnesium level in 4 hours  - BP normal to mild range  - no s/sx of pre-eclampsia or mag toxicity    DM  - BG well controlled  - continue to check BG q1-2   - D5NS at 30cc/h  - Cr stable at 2.8     Sushma K. Reddy, MD  PGY-4, OBGYN

## 2020-11-07 NOTE — PROGRESS NOTES
"LABOR NOTE    S:  Complaints: no  MD to bedside for cervical check    O: /71   Pulse 88   Temp 97 °F (36.1 °C)   Resp 18   Ht 5' 4" (1.626 m)   Wt 73.4 kg (161 lb 13.1 oz)   LMP  (LMP Unknown) Comment: pt. states last cycle was in May. She is not sure of the exact date.  SpO2 100%   BMI 27.78 kg/m²       SVE: c/t/h, miso placed      ASSESSMENT:   25 y.o.  at 23w2d, IOL in previable fetus in the setting of PreE with SF      Active Hospital Problems    Diagnosis  POA    *Severe pre-eclampsia in second trimester [O14.12]  Unknown    HTN (hypertension) [I10]  Yes     Currently on HCTZ 25 mg, losartan 100 mg and toprol 50 mg XR  At NOB visit, we discussed increasing HCTZ to 50 mg daily, continuing toprol and stopping the losartan  #Baseline labs, EKG ordered  #Had prior cardiology appointment that was missed with echo, needs to be rescheduled  #ASA at 12-16 weeks          Resolved Hospital Problems   No resolved problems to display.     0030: C/T/H Miso placed    PLAN:    Continue Close Maternal Monitoring  Recheck 4 hours or PRN      Katherine Boecking MD   Ob/Gyn PGY 1      "

## 2020-11-07 NOTE — ANESTHESIA PREPROCEDURE EVALUATION
[facility]  Anesthesia Pre-Operative Evaluation       Patient Name: Isabela Read  YOB: 1995  MRN: [id number]    SUBJECTIVE:     Isabela Read is a 25 y.o. female F at 23w1d who presents with preE w SF and FGR    Current pregnancy complicated by T1DM, CKD3, cHTN    Denies previous issues with neuraxial anesthesia.    Denies previous issues with general anesthesia.    Denies family history of issues with anesthesia.    Denies hx of seizures, strokes, HTN, heart failure, smoking, asthma, COPD, acid reflux, liver disease, kidney disease, bleeding disorders, taking blood thinners, back surgery.  Lab Results   Component Value Date     (H) 2020       OB History    Para Term  AB Living   1             SAB TAB Ectopic Multiple Live Births                  # Outcome Date GA Lbr Chalo/2nd Weight Sex Delivery Anes PTL Lv   1 Current                No past surgical history on file.     Patient Active Problem List   Diagnosis    Chest pain    HTN (hypertension)    Microalbuminuria    Dyspnea on exertion    Type 1 diabetes mellitus with diabetic chronic kidney disease    Ventricular bigeminy    Bilateral lower extremity edema    Vitamin D deficiency    Mixed hyperlipidemia    Renal disease    Anemia in pregnancy    Restrictive lung disease    Hypoglycemia associated with diabetes    Palpitations    Iron deficiency anemia    Poor fetal growth affecting management of mother in second trimester    Chronic hypertension with exacerbation during pregnancy in second trimester    Severe pre-eclampsia in second trimester       Review of patient's allergies indicates:  No Known Allergies    Social History     Socioeconomic History    Marital status: Single     Spouse name: Not on file    Number of children: Not on file    Years of education: Not on file    Highest education level: Not on file   Occupational History    Occupation:  at [employer]   Social Needs     Financial resource strain: Not on file    Food insecurity     Worry: Not on file     Inability: Not on file    Transportation needs     Medical: Not on file     Non-medical: Not on file   Tobacco Use    Smoking status: Never Smoker    Smokeless tobacco: Never Used   Substance and Sexual Activity    Alcohol use: No    Drug use: No    Sexual activity: Yes     Partners: Male     Comment: monogamous   Lifestyle    Physical activity     Days per week: Not on file     Minutes per session: Not on file    Stress: Not on file   Relationships    Social connections     Talks on phone: Not on file     Gets together: Not on file     Attends Religion service: Not on file     Active member of club or organization: Not on file     Attends meetings of clubs or organizations: Not on file     Relationship status: Not on file   Other Topics Concern    Not on file   Social History Narrative    Not on file       OBJECTIVE:     Weight:  Wt Readings from Last 4 Encounters:   11/06/20 73.4 kg (161 lb 13.1 oz)   11/06/20 73.4 kg (161 lb 13.1 oz)   11/03/20 72.8 kg (160 lb 9.7 oz)   10/14/20 63.7 kg (140 lb 6.9 oz)     Body mass index is 27.78 kg/m².    Outpatient Medications:  No current facility-administered medications on file prior to encounter.      Current Outpatient Medications on File Prior to Encounter   Medication Sig Dispense Refill    aspirin (ECOTRIN) 81 MG EC tablet Take 1 tablet (81 mg total) by mouth every evening. 150 tablet 1    blood sugar diagnostic Strp To check BG 4 - 6 times daily, to use with insurance preferred meter 150 each 11    blood-glucose meter kit To check BG 4 times daily, to use with insurance preferred meter 1 each 1    carvediloL (COREG) 12.5 MG tablet Take 1 tablet (12.5 mg total) by mouth 2 (two) times daily. 60 tablet 11    citalopram (CELEXA) 20 MG tablet Take 1 tablet (20 mg total) by mouth once daily. 90 tablet 0    enoxaparin (LOVENOX) 40 mg/0.4 mL Syrg Inject 0.4 mLs (40 mg  "total) into the skin once daily. 100 mL 3    ergocalciferol (ERGOCALCIFEROL) 50,000 unit Cap Take 1 capsule (50,000 Units total) by mouth every 7 days. 12 capsule 1    ferrous sulfate 325 (65 FE) MG EC tablet Take 1 tablet (325 mg total) by mouth 2 (two) times daily. 180 tablet 2    flash glucose scanning reader (FREESTYLE MARCY 14 DAY READER) Misc 1 each by Misc.(Non-Drug; Combo Route) route once daily. 1 each 0    fluocinolone and shower cap (DERMA-SMOOTHE/FS SCALP OIL) 0.01 % Oil Apply oil to damp scalp nightly and cover with shower cap. 1 Bottle 3    FREESTYLE MARCY 14 DAY SENSOR Kit USE 2 SENSORS TO CHECK GLUCOSE ONCE DAILY EVERY  14 DAYS 2 kit 4    hydroCHLOROthiazide (HYDRODIURIL) 25 MG tablet Take 1 tablet (25 mg total) by mouth once daily. 30 tablet 3    insulin (LANTUS SOLOSTAR U-100 INSULIN) glargine 100 units/mL (3mL) SubQ pen Inject 20 Units into the skin every evening 15 mL 6    insulin lispro (HUMALOG KWIKPEN INSULIN) 100 unit/mL pen Inject 10 units into skin the skin 3 three times daily with meals (Patient taking differently: 1-8 units with meals and 1 per 35 units over 120 for correction dose) 15 mL 6    iron,carbon,gluc-FA-B12-C-dss (FERRALET 90 DUAL/CITRANATAL BLOOM) 90-1-12-50 mg-mg-mcg-mg Tab Take 1 tablet by mouth once daily. 30 tablet 6    ketoconazole (NIZORAL) 2 % shampoo Wash hair with medicated shampoo at least 2x/week - let sit on scalp at least 5 minutes prior to rinsing 120 mL 5    lancets Misc To check BG 4 - 6 times daily, to use with insurance preferred meter 150 each 11    NIFEdipine (PROCARDIA-XL) 30 MG (OSM) 24 hr tablet Take 1 tablet (30 mg total) by mouth once daily. 30 tablet 11    pen needle, diabetic 32 gauge x 5/32" Ndle For three times daily injection 100 each 11    promethazine (PHENERGAN) 12.5 MG Tab Take 1 tablet (12.5 mg total) by mouth every 8 (eight) hours as needed. 30 tablet 1    sodium bicarbonate 650 MG tablet Take 1 tablet (650 mg total) by mouth 2 " (two) times daily. 180 tablet 1        Current Inpatient Medications:   carvediloL  25 mg Oral BID    citalopram  20 mg Oral Daily    [START ON 11/7/2020] hydroCHLOROthiazide  25 mg Oral Daily    insulin detemir U-100  20 Units Subcutaneous QHS    miSOPROStoL  50 mcg Vaginal Once    NIFEdipine  60 mg Oral Daily    oxytocin in lactated ringers  334 roque-units/min Intravenous Once    oxytocin in lactated ringers  95 roque-units/min Intravenous Once    sodium bicarbonate  650 mg Oral BID       BP Readings from Last 3 Encounters:   11/06/20 (!) 163/90   11/06/20 (!) 160/98   11/03/20 (!) 158/110         Chemistry        Component Value Date/Time     (L) 11/06/2020 1215    K 4.6 11/06/2020 1638     11/06/2020 1215    CO2 18 (L) 11/06/2020 1215    BUN 37 (H) 11/06/2020 1215    CREATININE 3.0 (H) 11/06/2020 1215     (H) 11/06/2020 1215        Component Value Date/Time    CALCIUM 8.4 (L) 11/06/2020 1215    ALKPHOS 65 11/06/2020 1215    AST 30 11/06/2020 1215    ALT 19 11/06/2020 1215    BILITOT 0.1 11/06/2020 1215    ESTGFRAFRICA 24 (A) 11/06/2020 1215    EGFRNONAA 21 (A) 11/06/2020 1215            Lab Results   Component Value Date    WBC 8.82 11/06/2020    HGB 8.8 (L) 11/06/2020    HCT 26.8 (L) 11/06/2020    MCV 89 11/06/2020     (H) 11/06/2020       No results for input(s): PT, INR, PROTIME, APTT in the last 72 hours.      Anesthesia Evaluation    I have reviewed the Patient Summary Reports.      I have reviewed the Medications.     Review of Systems  Anesthesia Hx:  Neg history of prior surgery. Denies Family Hx of Anesthesia complications.   Denies Personal Hx of Anesthesia complications.   Hematology/Oncology:  Hematology Normal   Oncology Normal     EENT/Dental:EENT/Dental Normal   Cardiovascular:   Hypertension    Pulmonary:  Pulmonary Normal    Renal/:   Chronic Renal Disease    Hepatic/GI:  Hepatic/GI Normal    Musculoskeletal:  Musculoskeletal Normal     Neurological:  Neurology Normal    Endocrine:   Diabetes, type 2    Dermatological:  Skin Normal    Psych:  Psychiatric Normal              Anesthesia Plan  Type of Anesthesia, risks & benefits discussed:  Anesthesia Type:  general, epidural, spinal  Patient's Preference:   Intra-op Monitoring Plan: standard ASA monitors  Intra-op Monitoring Plan Comments:   Post Op Pain Control Plan: multimodal analgesia, IV/PO Opioids PRN and per primary service following discharge from PACU  Post Op Pain Control Plan Comments:   Induction:   IV  Beta Blocker:  Patient is not currently on a Beta-Blocker (No further documentation required).       Informed Consent: Patient understands risks and agrees with Anesthesia plan.  Questions answered. Anesthesia consent signed with patient.  ASA Score: 2     Day of Surgery Review of History & Physical: I have interviewed and examined the patient. I have reviewed the patient's H&P dated:  There are no significant changes.  H&P update referred to the surgeon.         Ready For Surgery From Anesthesia Perspective.

## 2020-11-07 NOTE — ASSESSMENT & PLAN NOTE
- likely secondary to renal disease and worsened renal function  - K: 6.2 on admission   - decreased now to 4.8  - Peaked T waves on EKG. Calcium gluconate given.    - EKG repeated this AM, T waves no longer peaked  - Calcium gluconate/Insulin/Glucose/Albuterol given. Remains on insulin drip 2/2 diabetes

## 2020-11-07 NOTE — NURSING
Dr Uribe notified pt nauseous and vomiting with 4g magnesium bolus. Bolus paused. Dr Uribe gave verbal order to discontinue bolus at this time. 1.07g remaining on bolus when discontinued. Will recheck CMP/Mag in 4 h from bolus stop time.

## 2020-11-07 NOTE — ANESTHESIA PROCEDURE NOTES
Epidural    Patient location during procedure: OB   Reason for block: primary anesthetic   Diagnosis: IUP   Start time: 11/7/2020 1:31 PM  Timeout: 11/7/2020 1:30 PM  End time: 11/7/2020 2:00 PM  Surgery related to: Vaginal Delivery    Staffing  Performing Provider: Kenny Becker MD  Authorizing Provider: Chivo Gonzalez MD        Preanesthetic Checklist  Completed: patient identified, site marked, surgical consent, pre-op evaluation, timeout performed, IV checked, risks and benefits discussed, monitors and equipment checked, anesthesia consent given, hand hygiene performed and patient being monitored  Preparation  Patient position: sitting  Prep: ChloraPrep  Patient monitoring: Pulse Ox  Epidural  Skin Anesthetic: lidocaine 1%  Skin Wheal: 3 mL  Administration type: continuous  Approach: midline  Interspace: L4-5    Injection technique: JAMILA saline  Needle and Epidural Catheter  Needle type: Tuohy   Needle gauge: 17  Needle length: 3.5 inches  Catheter type: springwound  Catheter size: 19 G  Test dose: 3 mL of lidocaine 1.5% with Epi 1-to-200,000  Additional Documentation: incremental injection, negative aspiration for heme and CSF, no paresthesia on injection, no signs/symptoms of IV or SA injection, no significant pain on injection and no significant complaints from patient  Needle localization: anatomical landmarks  Medications:  Volume per aspiration: 5 mL  Time between aspirations: 5 minutes  Assessment  Ease of block: difficult  Patient's tolerance of the procedure: comfortable throughout block and no complaints  Additional Notes  Difficult placement with tight spaces. Attempted three levels and paramedian. Third level with loss on first pass. Patient tolerated procedure well with no significant complaints. No inadvertent dural puncture with Tuohy.  Dural puncture not performed with spinal needle

## 2020-11-07 NOTE — PROGRESS NOTES
Patient transferred to  6 in labor and delivery. Anesthesia called and notified of patient's arrival and assistance with IV requested.
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
## 2020-11-07 NOTE — NURSING
Notified Dr Uribe of Peaked T waves on interpreted telemetry monitoring strip received from pilar. Strip timed for 0939 but unsure when strip was tubed to L&D. Not notified by pilar at time of peaked T waves or time strip was sent to unit. Dr Uribe notified @ 1430 when strip seen on desk. STAT 12 lead EKG ordered. Will continue to closely monitor.

## 2020-11-07 NOTE — ASSESSMENT & PLAN NOTE
- Fetus measuring 4 weeks behind gestational age   - induction recommended due to pre-eclampsia w/ severe features and worsening renal function in the setting of other severe maternal comorbidities  - Fetus will not be viable at birth given severe growth restriction, pt aware. Will not give steroids, will not monitor, will not give 6g Mag for neuroprotection

## 2020-11-07 NOTE — ASSESSMENT & PLAN NOTE
- BP: (115-178)/() 123/68  - AST/ALT wnl;  Plt 444  - Creatinine increased from 2.0 to 3.0 on admission   - 2.6 this AM  - s/p Procardia 20, Labetalol 20/40/80/20/40/80   - home procardia increased to 60XL   - continued on home coreg and HCTZ   - will use Lasix prn for diuresis   - On magnesium for seizure ppx   - Mag 4g bolus given x 2 (most recently). Will recheck Mag level q4h to determine need for repeat boluses     - no s/sx of magnesium toxicity or pre-eclampsia this AM  - Due to severe pre-eclampsia with worsening renal function in the setting of multiple maternal comorbidities, IOL recommended per MFM. Fetus is currently not viable based on EFW. See FGR problem.  Pt is aware

## 2020-11-08 PROBLEM — N18.32 STAGE 3B CHRONIC KIDNEY DISEASE: Status: ACTIVE | Noted: 2020-11-08

## 2020-11-08 LAB
ALBUMIN SERPL BCP-MCNC: 1.4 G/DL (ref 3.5–5.2)
ALBUMIN SERPL BCP-MCNC: 1.4 G/DL (ref 3.5–5.2)
ALBUMIN SERPL BCP-MCNC: 1.5 G/DL (ref 3.5–5.2)
ALBUMIN SERPL BCP-MCNC: 1.5 G/DL (ref 3.5–5.2)
ALBUMIN SERPL BCP-MCNC: 1.6 G/DL (ref 3.5–5.2)
ALP SERPL-CCNC: 56 U/L (ref 55–135)
ALP SERPL-CCNC: 59 U/L (ref 55–135)
ALP SERPL-CCNC: 61 U/L (ref 55–135)
ALP SERPL-CCNC: 62 U/L (ref 55–135)
ALP SERPL-CCNC: 67 U/L (ref 55–135)
ALT SERPL W/O P-5'-P-CCNC: 16 U/L (ref 10–44)
ALT SERPL W/O P-5'-P-CCNC: 17 U/L (ref 10–44)
ALT SERPL W/O P-5'-P-CCNC: 18 U/L (ref 10–44)
ALT SERPL W/O P-5'-P-CCNC: 18 U/L (ref 10–44)
ALT SERPL W/O P-5'-P-CCNC: 23 U/L (ref 10–44)
ANION GAP SERPL CALC-SCNC: 10 MMOL/L (ref 8–16)
ANION GAP SERPL CALC-SCNC: 10 MMOL/L (ref 8–16)
ANION GAP SERPL CALC-SCNC: 11 MMOL/L (ref 8–16)
ANION GAP SERPL CALC-SCNC: 9 MMOL/L (ref 8–16)
ANION GAP SERPL CALC-SCNC: 9 MMOL/L (ref 8–16)
AST SERPL-CCNC: 20 U/L (ref 10–40)
AST SERPL-CCNC: 21 U/L (ref 10–40)
AST SERPL-CCNC: 22 U/L (ref 10–40)
AST SERPL-CCNC: 25 U/L (ref 10–40)
AST SERPL-CCNC: 31 U/L (ref 10–40)
BILIRUB SERPL-MCNC: 0.1 MG/DL (ref 0.1–1)
BILIRUB SERPL-MCNC: 0.1 MG/DL (ref 0.1–1)
BILIRUB SERPL-MCNC: 0.2 MG/DL (ref 0.1–1)
BUN SERPL-MCNC: 37 MG/DL (ref 6–20)
BUN SERPL-MCNC: 37 MG/DL (ref 6–20)
BUN SERPL-MCNC: 38 MG/DL (ref 6–20)
BUN SERPL-MCNC: 39 MG/DL (ref 6–20)
BUN SERPL-MCNC: 40 MG/DL (ref 6–20)
CALCIUM SERPL-MCNC: 7.5 MG/DL (ref 8.7–10.5)
CALCIUM SERPL-MCNC: 7.7 MG/DL (ref 8.7–10.5)
CALCIUM SERPL-MCNC: 7.8 MG/DL (ref 8.7–10.5)
CALCIUM SERPL-MCNC: 7.8 MG/DL (ref 8.7–10.5)
CALCIUM SERPL-MCNC: 8.1 MG/DL (ref 8.7–10.5)
CHLORIDE SERPL-SCNC: 102 MMOL/L (ref 95–110)
CHLORIDE SERPL-SCNC: 103 MMOL/L (ref 95–110)
CHLORIDE SERPL-SCNC: 104 MMOL/L (ref 95–110)
CO2 SERPL-SCNC: 14 MMOL/L (ref 23–29)
CO2 SERPL-SCNC: 14 MMOL/L (ref 23–29)
CO2 SERPL-SCNC: 15 MMOL/L (ref 23–29)
CREAT SERPL-MCNC: 3.1 MG/DL (ref 0.5–1.4)
CREAT SERPL-MCNC: 3.1 MG/DL (ref 0.5–1.4)
CREAT SERPL-MCNC: 3.2 MG/DL (ref 0.5–1.4)
EST. GFR  (AFRICAN AMERICAN): 22 ML/MIN/1.73 M^2
EST. GFR  (AFRICAN AMERICAN): 23 ML/MIN/1.73 M^2
EST. GFR  (AFRICAN AMERICAN): 23 ML/MIN/1.73 M^2
EST. GFR  (NON AFRICAN AMERICAN): 19 ML/MIN/1.73 M^2
EST. GFR  (NON AFRICAN AMERICAN): 20 ML/MIN/1.73 M^2
EST. GFR  (NON AFRICAN AMERICAN): 20 ML/MIN/1.73 M^2
GLUCOSE SERPL-MCNC: 126 MG/DL (ref 70–110)
GLUCOSE SERPL-MCNC: 176 MG/DL (ref 70–110)
GLUCOSE SERPL-MCNC: 240 MG/DL (ref 70–110)
GLUCOSE SERPL-MCNC: 243 MG/DL (ref 70–110)
GLUCOSE SERPL-MCNC: 246 MG/DL (ref 70–110)
MAGNESIUM SERPL-MCNC: 3.7 MG/DL (ref 1.6–2.6)
MAGNESIUM SERPL-MCNC: 3.8 MG/DL (ref 1.6–2.6)
MAGNESIUM SERPL-MCNC: 4.2 MG/DL (ref 1.6–2.6)
MAGNESIUM SERPL-MCNC: 4.7 MG/DL (ref 1.6–2.6)
MAGNESIUM SERPL-MCNC: 6 MG/DL (ref 1.6–2.6)
POCT GLUCOSE: 163 MG/DL (ref 70–110)
POCT GLUCOSE: 170 MG/DL (ref 70–110)
POCT GLUCOSE: 180 MG/DL (ref 70–110)
POCT GLUCOSE: 182 MG/DL (ref 70–110)
POCT GLUCOSE: 188 MG/DL (ref 70–110)
POCT GLUCOSE: 235 MG/DL (ref 70–110)
POCT GLUCOSE: 238 MG/DL (ref 70–110)
POCT GLUCOSE: 302 MG/DL (ref 70–110)
POCT GLUCOSE: 84 MG/DL (ref 70–110)
POTASSIUM SERPL-SCNC: 4.6 MMOL/L (ref 3.5–5.1)
POTASSIUM SERPL-SCNC: 4.6 MMOL/L (ref 3.5–5.1)
POTASSIUM SERPL-SCNC: 4.7 MMOL/L (ref 3.5–5.1)
POTASSIUM SERPL-SCNC: 4.7 MMOL/L (ref 3.5–5.1)
POTASSIUM SERPL-SCNC: 4.8 MMOL/L (ref 3.5–5.1)
PROT SERPL-MCNC: 4.9 G/DL (ref 6–8.4)
PROT SERPL-MCNC: 5.1 G/DL (ref 6–8.4)
PROT SERPL-MCNC: 5.2 G/DL (ref 6–8.4)
PROT SERPL-MCNC: 5.3 G/DL (ref 6–8.4)
PROT SERPL-MCNC: 5.8 G/DL (ref 6–8.4)
SODIUM SERPL-SCNC: 125 MMOL/L (ref 136–145)
SODIUM SERPL-SCNC: 127 MMOL/L (ref 136–145)
SODIUM SERPL-SCNC: 129 MMOL/L (ref 136–145)

## 2020-11-08 PROCEDURE — 63600175 PHARM REV CODE 636 W HCPCS: Performed by: STUDENT IN AN ORGANIZED HEALTH CARE EDUCATION/TRAINING PROGRAM

## 2020-11-08 PROCEDURE — C9399 UNCLASSIFIED DRUGS OR BIOLOG: HCPCS | Performed by: STUDENT IN AN ORGANIZED HEALTH CARE EDUCATION/TRAINING PROGRAM

## 2020-11-08 PROCEDURE — 25000003 PHARM REV CODE 250: Performed by: STUDENT IN AN ORGANIZED HEALTH CARE EDUCATION/TRAINING PROGRAM

## 2020-11-08 PROCEDURE — 83735 ASSAY OF MAGNESIUM: CPT | Mod: 91

## 2020-11-08 PROCEDURE — 80053 COMPREHEN METABOLIC PANEL: CPT

## 2020-11-08 PROCEDURE — 99232 SBSQ HOSP IP/OBS MODERATE 35: CPT | Mod: ,,, | Performed by: OBSTETRICS & GYNECOLOGY

## 2020-11-08 PROCEDURE — 11000001 HC ACUTE MED/SURG PRIVATE ROOM

## 2020-11-08 PROCEDURE — 99232 PR SUBSEQUENT HOSPITAL CARE,LEVL II: ICD-10-PCS | Mod: ,,, | Performed by: OBSTETRICS & GYNECOLOGY

## 2020-11-08 PROCEDURE — 36415 COLL VENOUS BLD VENIPUNCTURE: CPT

## 2020-11-08 PROCEDURE — 63600175 PHARM REV CODE 636 W HCPCS

## 2020-11-08 RX ORDER — GLUCAGON 1 MG
1 KIT INJECTION
Status: DISCONTINUED | OUTPATIENT
Start: 2020-11-08 | End: 2020-11-11 | Stop reason: HOSPADM

## 2020-11-08 RX ORDER — ONDANSETRON 8 MG/1
8 TABLET, ORALLY DISINTEGRATING ORAL EVERY 8 HOURS PRN
Status: DISCONTINUED | OUTPATIENT
Start: 2020-11-08 | End: 2020-11-11 | Stop reason: HOSPADM

## 2020-11-08 RX ORDER — INSULIN ASPART 100 [IU]/ML
4 INJECTION, SOLUTION INTRAVENOUS; SUBCUTANEOUS
Status: DISCONTINUED | OUTPATIENT
Start: 2020-11-09 | End: 2020-11-08

## 2020-11-08 RX ORDER — IBUPROFEN 200 MG
24 TABLET ORAL
Status: DISCONTINUED | OUTPATIENT
Start: 2020-11-08 | End: 2020-11-11 | Stop reason: HOSPADM

## 2020-11-08 RX ORDER — PRENATAL WITH FERROUS FUM AND FOLIC ACID 3080; 920; 120; 400; 22; 1.84; 3; 20; 10; 1; 12; 200; 27; 25; 2 [IU]/1; [IU]/1; MG/1; [IU]/1; MG/1; MG/1; MG/1; MG/1; MG/1; MG/1; UG/1; MG/1; MG/1; MG/1; MG/1
1 TABLET ORAL DAILY
Status: DISCONTINUED | OUTPATIENT
Start: 2020-11-08 | End: 2020-11-11 | Stop reason: HOSPADM

## 2020-11-08 RX ORDER — INSULIN ASPART 100 [IU]/ML
4 INJECTION, SOLUTION INTRAVENOUS; SUBCUTANEOUS
Status: COMPLETED | OUTPATIENT
Start: 2020-11-08 | End: 2020-11-08

## 2020-11-08 RX ORDER — ENOXAPARIN SODIUM 100 MG/ML
30 INJECTION SUBCUTANEOUS EVERY 24 HOURS
Status: DISCONTINUED | OUTPATIENT
Start: 2020-11-08 | End: 2020-11-08

## 2020-11-08 RX ORDER — INSULIN ASPART 100 [IU]/ML
1-10 INJECTION, SOLUTION INTRAVENOUS; SUBCUTANEOUS
Status: DISCONTINUED | OUTPATIENT
Start: 2020-11-08 | End: 2020-11-11 | Stop reason: HOSPADM

## 2020-11-08 RX ORDER — OXYCODONE HYDROCHLORIDE 5 MG/1
5 TABLET ORAL EVERY 4 HOURS PRN
Status: DISCONTINUED | OUTPATIENT
Start: 2020-11-08 | End: 2020-11-11 | Stop reason: HOSPADM

## 2020-11-08 RX ORDER — LIDOCAINE HYDROCHLORIDE 10 MG/ML
INJECTION INFILTRATION; PERINEURAL
Status: DISPENSED
Start: 2020-11-08 | End: 2020-11-08

## 2020-11-08 RX ORDER — SIMETHICONE 80 MG
1 TABLET,CHEWABLE ORAL EVERY 6 HOURS PRN
Status: DISCONTINUED | OUTPATIENT
Start: 2020-11-08 | End: 2020-11-11 | Stop reason: HOSPADM

## 2020-11-08 RX ORDER — DIPHENHYDRAMINE HCL 25 MG
25 CAPSULE ORAL EVERY 4 HOURS PRN
Status: DISCONTINUED | OUTPATIENT
Start: 2020-11-08 | End: 2020-11-11 | Stop reason: HOSPADM

## 2020-11-08 RX ORDER — OXYTOCIN 10 [USP'U]/ML
INJECTION, SOLUTION INTRAMUSCULAR; INTRAVENOUS
Status: DISPENSED
Start: 2020-11-08 | End: 2020-11-08

## 2020-11-08 RX ORDER — FUROSEMIDE 10 MG/ML
40 INJECTION INTRAMUSCULAR; INTRAVENOUS ONCE
Status: COMPLETED | OUTPATIENT
Start: 2020-11-08 | End: 2020-11-08

## 2020-11-08 RX ORDER — OXYTOCIN/RINGER'S LACTATE 30/500 ML
95 PLASTIC BAG, INJECTION (ML) INTRAVENOUS ONCE
Status: DISCONTINUED | OUTPATIENT
Start: 2020-11-08 | End: 2020-11-08

## 2020-11-08 RX ORDER — FUROSEMIDE 10 MG/ML
40 INJECTION INTRAMUSCULAR; INTRAVENOUS DAILY
Status: DISCONTINUED | OUTPATIENT
Start: 2020-11-09 | End: 2020-11-09

## 2020-11-08 RX ORDER — ACETAMINOPHEN 325 MG/1
650 TABLET ORAL EVERY 6 HOURS
Status: DISPENSED | OUTPATIENT
Start: 2020-11-08 | End: 2020-11-08

## 2020-11-08 RX ORDER — HEPARIN SODIUM 5000 [USP'U]/ML
5000 INJECTION, SOLUTION INTRAVENOUS; SUBCUTANEOUS 3 TIMES DAILY
Status: DISCONTINUED | OUTPATIENT
Start: 2020-11-08 | End: 2020-11-10

## 2020-11-08 RX ORDER — INSULIN ASPART 100 [IU]/ML
1-10 INJECTION, SOLUTION INTRAVENOUS; SUBCUTANEOUS
Status: DISCONTINUED | OUTPATIENT
Start: 2020-11-08 | End: 2020-11-08

## 2020-11-08 RX ORDER — DIPHENHYDRAMINE HYDROCHLORIDE 50 MG/ML
25 INJECTION INTRAMUSCULAR; INTRAVENOUS EVERY 4 HOURS PRN
Status: DISCONTINUED | OUTPATIENT
Start: 2020-11-08 | End: 2020-11-11 | Stop reason: HOSPADM

## 2020-11-08 RX ORDER — IBUPROFEN 200 MG
16 TABLET ORAL
Status: DISCONTINUED | OUTPATIENT
Start: 2020-11-08 | End: 2020-11-11 | Stop reason: HOSPADM

## 2020-11-08 RX ORDER — ACETAMINOPHEN 325 MG/1
650 TABLET ORAL EVERY 6 HOURS PRN
Status: DISCONTINUED | OUTPATIENT
Start: 2020-11-08 | End: 2020-11-11 | Stop reason: HOSPADM

## 2020-11-08 RX ORDER — INSULIN ASPART 100 [IU]/ML
4 INJECTION, SOLUTION INTRAVENOUS; SUBCUTANEOUS ONCE
Status: COMPLETED | OUTPATIENT
Start: 2020-11-08 | End: 2020-11-08

## 2020-11-08 RX ORDER — MAGNESIUM SULFATE HEPTAHYDRATE 40 MG/ML
4 INJECTION, SOLUTION INTRAVENOUS ONCE
Status: COMPLETED | OUTPATIENT
Start: 2020-11-08 | End: 2020-11-08

## 2020-11-08 RX ORDER — SODIUM CHLORIDE 9 MG/ML
INJECTION, SOLUTION INTRAVENOUS CONTINUOUS
Status: DISCONTINUED | OUTPATIENT
Start: 2020-11-08 | End: 2020-11-08

## 2020-11-08 RX ORDER — HYDROCORTISONE 25 MG/G
CREAM TOPICAL 3 TIMES DAILY PRN
Status: DISCONTINUED | OUTPATIENT
Start: 2020-11-08 | End: 2020-11-11 | Stop reason: HOSPADM

## 2020-11-08 RX ORDER — DOCUSATE SODIUM 100 MG/1
200 CAPSULE, LIQUID FILLED ORAL 2 TIMES DAILY PRN
Status: DISCONTINUED | OUTPATIENT
Start: 2020-11-08 | End: 2020-11-11 | Stop reason: HOSPADM

## 2020-11-08 RX ORDER — SODIUM BICARBONATE 650 MG/1
650 TABLET ORAL DAILY
Status: DISCONTINUED | OUTPATIENT
Start: 2020-11-09 | End: 2020-11-11 | Stop reason: HOSPADM

## 2020-11-08 RX ADMIN — ACETAMINOPHEN 650 MG: 325 TABLET, FILM COATED ORAL at 11:11

## 2020-11-08 RX ADMIN — HYDROCHLOROTHIAZIDE 25 MG: 25 TABLET ORAL at 08:11

## 2020-11-08 RX ADMIN — CARVEDILOL 25 MG: 12.5 TABLET, FILM COATED ORAL at 08:11

## 2020-11-08 RX ADMIN — MAGNESIUM SULFATE HEPTAHYDRATE 4 G: 40 INJECTION, SOLUTION INTRAVENOUS at 04:11

## 2020-11-08 RX ADMIN — FUROSEMIDE 40 MG: 10 INJECTION, SOLUTION INTRAMUSCULAR; INTRAVENOUS at 09:11

## 2020-11-08 RX ADMIN — SODIUM BICARBONATE 650 MG TABLET 650 MG: at 08:11

## 2020-11-08 RX ADMIN — SODIUM CHLORIDE: 0.9 INJECTION, SOLUTION INTRAVENOUS at 09:11

## 2020-11-08 RX ADMIN — INSULIN DETEMIR 7 UNITS: 100 INJECTION, SOLUTION SUBCUTANEOUS at 08:11

## 2020-11-08 RX ADMIN — INSULIN ASPART 8 UNITS: 100 INJECTION, SOLUTION INTRAVENOUS; SUBCUTANEOUS at 11:11

## 2020-11-08 RX ADMIN — INSULIN ASPART 2 UNITS: 100 INJECTION, SOLUTION INTRAVENOUS; SUBCUTANEOUS at 10:11

## 2020-11-08 RX ADMIN — HEPARIN SODIUM 5000 UNITS: 5000 INJECTION INTRAVENOUS; SUBCUTANEOUS at 08:11

## 2020-11-08 RX ADMIN — INSULIN ASPART 4 UNITS: 100 INJECTION, SOLUTION INTRAVENOUS; SUBCUTANEOUS at 05:11

## 2020-11-08 RX ADMIN — INSULIN DETEMIR 5 UNITS: 100 INJECTION, SOLUTION SUBCUTANEOUS at 12:11

## 2020-11-08 RX ADMIN — ACETAMINOPHEN 650 MG: 325 TABLET, FILM COATED ORAL at 06:11

## 2020-11-08 RX ADMIN — INSULIN ASPART 4 UNITS: 100 INJECTION, SOLUTION INTRAVENOUS; SUBCUTANEOUS at 03:11

## 2020-11-08 RX ADMIN — PROMETHAZINE HYDROCHLORIDE 12.5 MG: 25 INJECTION INTRAMUSCULAR; INTRAVENOUS at 04:11

## 2020-11-08 RX ADMIN — INSULIN ASPART 4 UNITS: 100 INJECTION, SOLUTION INTRAVENOUS; SUBCUTANEOUS at 08:11

## 2020-11-08 RX ADMIN — NIFEDIPINE 60 MG: 30 TABLET, FILM COATED, EXTENDED RELEASE ORAL at 08:11

## 2020-11-08 RX ADMIN — CITALOPRAM HYDROBROMIDE 20 MG: 20 TABLET ORAL at 08:11

## 2020-11-08 RX ADMIN — INSULIN ASPART 2 UNITS: 100 INJECTION, SOLUTION INTRAVENOUS; SUBCUTANEOUS at 06:11

## 2020-11-08 RX ADMIN — INSULIN ASPART 4 UNITS: 100 INJECTION, SOLUTION INTRAVENOUS; SUBCUTANEOUS at 09:11

## 2020-11-08 NOTE — ASSESSMENT & PLAN NOTE
- BP: (115-178)/() 123/68  - AST/ALT wnl;  Plt 444  - Creatinine increased from 2.0 to 3.0 on admission   - 3.2 this AM  - s/p Procardia 20, Labetalol 20/40/80/20/40/80   - home procardia increased to 60XL   - continued on home coreg and HCTZ   - On magnesium for seizure ppx   - Mag 4g bolus given x 2 (most recently). Will recheck Mag level q4h to determine need for repeat boluses     - no s/sx of magnesium toxicity or pre-eclampsia this AM  - Due to severe pre-eclampsia with worsening renal function in the setting of multiple maternal comorbidities, IOL recommended per MFM.   - s/p

## 2020-11-08 NOTE — ASSESSMENT & PLAN NOTE
- likely secondary to renal disease and worsened renal function  - K: 6.2 on admission   - decreased now to 4.7  - Peaked T waves on EKG. Calcium gluconate/insulin/albuterol given.    - EKG repeated yest AM, T waves no longer peaked   - peaked T waves again noted on tele yesterday, pt given albuterol along with insulin gtt for DM with subsequent decrease in K and improvement in T waves.

## 2020-11-08 NOTE — SUBJECTIVE & OBJECTIVE
Interval History:     Doing well this morning. Denies pain. Lochia is mild to moderate. Voiding spontaneously. Unsure she wants to talk to telepsychiatry or SW today. Recommended for psychiatric support. Family at bedside and have been supportive. No other complaints. Continue to monitor renal function/mag levels and maintain therapeutic mag level x 24h pp.    Objective:     Vital Signs (Most Recent):  Temp: 98.8 °F (37.1 °C) (11/08/20 0620)  Pulse: 97 (11/08/20 0638)  Resp: 18 (11/08/20 0620)  BP: 130/76 (11/08/20 0620)  SpO2: 100 % (11/08/20 0638) Vital Signs (24h Range):  Temp:  [98 °F (36.7 °C)-98.8 °F (37.1 °C)] 98.8 °F (37.1 °C)  Pulse:  [76-97] 97  Resp:  [16-20] 18  SpO2:  [97 %-100 %] 100 %  BP: ()/(50-90) 130/76     Weight: 73 kg (160 lb 15 oz)  Body mass index is 27.62 kg/m².      Intake/Output Summary (Last 24 hours) at 11/8/2020 0742  Last data filed at 11/8/2020 0000  Gross per 24 hour   Intake 1540.98 ml   Output 575 ml   Net 965.98 ml           Significant Labs:  Lab Results   Component Value Date    GROUPTRH O POS 11/07/2020    HEPBSAG Negative 07/29/2020     Recent Labs   Lab 11/06/20  1215   HGB 8.8*   HCT 26.8*     Recent Labs   Lab 11/03/20  1244  11/07/20  1550 11/07/20  1749 11/07/20  2143 11/08/20  0630      < > 134*  --  129* 127*   K 4.7   < > 5.0 4.9 4.8 4.7      < > 108  --  106 102   CO2 19*   < > 17*  --  17* 15*   BUN 39*   < > 36*  --  37* 37*   CREATININE 2.6*   < > 2.9*  --  3.0* 3.2*   *   < > 61*  --  63* 243*   PROT  --    < > 5.4*  --  5.4* 5.8*   BILITOT  --    < > 0.2  --  0.1 0.1   ALKPHOS  --    < > 56  --  49* 61   ALT  --    < > 20  --  21 23   AST  --    < > 32  --  32 31   MG  --    < > 4.6*  --  4.5* 4.2*   PHOS 4.5  --   --   --   --   --     < > = values in this interval not displayed.          I have personallly reviewed all pertinent lab results from the last 24 hours.    Physical Exam:   Constitutional: She appears well-developed and  well-nourished. No distress.    HENT:   Head: Normocephalic and atraumatic.    Eyes: EOM are normal.    Neck: Normal range of motion.    Cardiovascular: Normal rate, regular rhythm and normal heart sounds.     Pulmonary/Chest: Effort normal and breath sounds normal. No respiratory distress. She has no wheezes. She has no rales.        Abdominal: Soft. There is no abdominal tenderness.                 Neurological: She is alert. She displays normal reflexes.    Skin: Skin is warm and dry.    Psychiatric: She has a normal mood and affect.   Responding appropriately to questions

## 2020-11-08 NOTE — ANESTHESIA POSTPROCEDURE EVALUATION
Anesthesia Post Evaluation    Patient: Isabela Read    Procedure(s) Performed: * No procedures listed *    Final Anesthesia Type: epidural    Patient location during evaluation: floor  Patient participation: Yes- Able to Participate  Level of consciousness: awake and alert  Post-procedure vital signs: reviewed and stable  Pain management: adequate  Airway patency: patent    PONV status at discharge: No PONV  Anesthetic complications: no      Cardiovascular status: blood pressure returned to baseline and hemodynamically stable  Respiratory status: unassisted, spontaneous ventilation and room air  Hydration status: euvolemic  Follow-up not needed.          Vitals Value Taken Time   /62 11/08/20 1150   Temp 35.9 °C (96.6 °F) 11/08/20 1150   Pulse 84 11/08/20 1150   Resp 16 11/08/20 1150   SpO2 98 % 11/08/20 1150         No case tracking events are documented in the log.      Pain/Renny Score: Pain Rating Prior to Med Admin: 3 (11/8/2020 11:48 AM)  Pain Rating Post Med Admin: 0 (11/8/2020  7:30 AM)

## 2020-11-08 NOTE — PROGRESS NOTES
Pt's breakfast tray arrived - POCT 235 mg/dL.  Pt's tray has 71 gm of carbs.  Asked pt if she felt like she was hungry enough to eat all her breakfast; explained concern of giving too much insulin and having subsequent hypoglycemia.  Pt is hungry and wants to eat.  Dr. Gamez notified; 4 units asparte given sub q to left arm.      40 mg IVP lasix given.  NS infusion initiated at 75 cc/hr to correct hyponatremia.  Pt reminded to keep track of output; told her I would come in periodically to record amount.      Pt grieving appropriately; quiet but appreciative/calm.  POC discussed with pt and her mother, Kiersten.  Will continue to monitor closely.

## 2020-11-08 NOTE — PROGRESS NOTES
4 gm mag bolus ordered by OB team.  Went to pt's room to administer it; pt just finished her lunch.  Said that the mag boluses always make her vomit.  Pt refuses mag at this time.  Dr. Gamez notified; will go speak to pt and mother shortly.

## 2020-11-08 NOTE — ASSESSMENT & PLAN NOTE
- CKD IIIB - IV  - Baseline Creatinine 2.0  - Increased Creatinine to 3.0 consistent with worsening renal function in setting of PreE w/ SF   - Cr 3.2 this AM  - Severe proteinuria. On Lovenox. Held in setting of induction.    - Restart lovenox PPD#1 if hemodynamically stable  - Nephrology consulted, appreciate recs   - rec d/c IVF, and avoiding LR when hyperkalemic - pt on minimal IVF with D5, insulin, magnesium boluses   - Lasix 40 daily postpartum - start once BP are higher   - Bicarb 650 BID  - pt may need RRT, will need to f/u with her nephrologist pp

## 2020-11-08 NOTE — ASSESSMENT & PLAN NOTE
- continue home celexa  - will need close f/u for mood check following delivery   - declines SW or telepsych consult at this time

## 2020-11-08 NOTE — PROGRESS NOTES
[facility]  Obstetrics  Postpartum Progress Note    Patient Name: Isabela Read  MRN: [id number]  Admission Date: 2020  Hospital Length of Stay: 2 days  Attending Physician: Negar Solano MD  Primary Care Provider: Primary Doctor No    Subjective:     Principal Problem:Severe pre-eclampsia in second trimester    Hospital Course:  2020: Patient admitted due to severe pre-e SI on cHTN with worsening renal function on admission to the hospital, new FGR (EFW 256g, 3SD less than mean for GA), and severe range blood pressures. Started on magnesium for seizure ppx due to severe pressures. IOL initiated due to concern for worsening maternal renal function in the setting of multiple medical comorbidities. Pt and family counseled extensively. Hyperkalemia corrected, pt continued on telemetry.  2020: IOL continued. Potassium levels and renal function as well as magnesium levels monitored. Mag sulfate continued via 4g bolus prn. Pt had  without complications, delivery of placenta immediately following fetus. Fetus noted to be nonviable at birth. Mirena placed immediately postpartum.  2020: Doing well this morning. Denies pain. Lochia is mild to moderate. Voiding spontaneously. Unsure she wants to talk to telepsychiatry or SW today. Recommended for psychiatric support. Family at bedside and have been supportive. No other complaints. Continue to monitor renal function/mag levels and maintain therapeutic mag level x 24h pp.    Interval History:     Doing well this morning. Denies pain. Lochia is mild to moderate. Voiding spontaneously. Unsure she wants to talk to telepsychiatry or SW today. Recommended for psychiatric support. Family at bedside and have been supportive. No other complaints. Continue to monitor renal function/mag levels and maintain therapeutic mag level x 24h pp.    Objective:     Vital Signs (Most Recent):  Temp: 98.8 °F (37.1 °C) (20 0620)  Pulse: 97  (11/08/20 0638)  Resp: 18 (11/08/20 0620)  BP: 130/76 (11/08/20 0620)  SpO2: 100 % (11/08/20 0638) Vital Signs (24h Range):  Temp:  [98 °F (36.7 °C)-98.8 °F (37.1 °C)] 98.8 °F (37.1 °C)  Pulse:  [76-97] 97  Resp:  [16-20] 18  SpO2:  [97 %-100 %] 100 %  BP: ()/(50-90) 130/76     Weight: 73 kg (160 lb 15 oz)  Body mass index is 27.62 kg/m².      Intake/Output Summary (Last 24 hours) at 11/8/2020 0742  Last data filed at 11/8/2020 0000  Gross per 24 hour   Intake 1540.98 ml   Output 575 ml   Net 965.98 ml           Significant Labs:  Lab Results   Component Value Date    GROUPTRH O POS 11/07/2020    HEPBSAG Negative 07/29/2020     Recent Labs   Lab 11/06/20  1215   HGB 8.8*   HCT 26.8*     Recent Labs   Lab 11/03/20  1244  11/07/20  1550 11/07/20  1749 11/07/20  2143 11/08/20  0630      < > 134*  --  129* 127*   K 4.7   < > 5.0 4.9 4.8 4.7      < > 108  --  106 102   CO2 19*   < > 17*  --  17* 15*   BUN 39*   < > 36*  --  37* 37*   CREATININE 2.6*   < > 2.9*  --  3.0* 3.2*   *   < > 61*  --  63* 243*   PROT  --    < > 5.4*  --  5.4* 5.8*   BILITOT  --    < > 0.2  --  0.1 0.1   ALKPHOS  --    < > 56  --  49* 61   ALT  --    < > 20  --  21 23   AST  --    < > 32  --  32 31   MG  --    < > 4.6*  --  4.5* 4.2*   PHOS 4.5  --   --   --   --   --     < > = values in this interval not displayed.          I have personallly reviewed all pertinent lab results from the last 24 hours.    Physical Exam:   Constitutional: She appears well-developed and well-nourished. No distress.    HENT:   Head: Normocephalic and atraumatic.    Eyes: EOM are normal.    Neck: Normal range of motion.    Cardiovascular: Normal rate, regular rhythm and normal heart sounds.     Pulmonary/Chest: Effort normal and breath sounds normal. No respiratory distress. She has no wheezes. She has no rales.        Abdominal: Soft. There is no abdominal tenderness.                 Neurological: She is alert. She displays normal reflexes.     Skin: Skin is warm and dry.    Psychiatric: She has a normal mood and affect.   Responding appropriately to questions         Assessment/Plan:     25 y.o. female  for:    * Severe pre-eclampsia in second trimester  - BP: (115-178)/() 123/68  - AST/ALT wnl;  Plt 444  - Creatinine increased from 2.0 to 3.0 on admission   - 3.2 this AM  - s/p Procardia 20, Labetalol 20/40/80/20/40/80   - home procardia increased to 60XL   - continued on home coreg and HCTZ   - On magnesium for seizure ppx   - Mag 4g bolus given x 2 (most recently). Will recheck Mag level q4h to determine need for repeat boluses     - no s/sx of magnesium toxicity or pre-eclampsia this AM  - Due to severe pre-eclampsia with worsening renal function in the setting of multiple maternal comorbidities, IOL recommended per MFM.   - s/p     Encounter for induction of labor  - s/p     Anxiety  - continue home celexa  - will need close f/u for mood check following delivery   - declines SW or telepsych consult at this time    Hyperkalemia  - likely secondary to renal disease and worsened renal function  - K: 6.2 on admission   - decreased now to 4.7  - Peaked T waves on EKG. Calcium gluconate/insulin/albuterol given.    - EKG repeated yest AM, T waves no longer peaked   - peaked T waves again noted on tele yesterday, pt given albuterol along with insulin gtt for DM with subsequent decrease in K and improvement in T waves.    Poor fetal growth affecting management of mother in second trimester  - s/p , IUFD    Restrictive lung disease  - pt has h/o restrictive lung disease; PFTs early this pregnancy were wnl  - OK to use hemabate in the setting of PPH    Renal disease  - CKD IIIB - IV  - Baseline Creatinine 2.0  - Increased Creatinine to 3.0 consistent with worsening renal function in setting of PreE w/ SF   - Cr 3.2 this AM  - Severe proteinuria. On Lovenox. Held in setting of induction.    - Restart lovenox PPD#1 if hemodynamically  stable  - Nephrology consulted, appreciate recs   - rec d/c IVF, and avoiding LR when hyperkalemic - pt on minimal IVF with D5, insulin, magnesium boluses   - Lasix 40 daily postpartum - start once BP are higher   - Bicarb 650 BID  - pt may need RRT, will need to f/u with her nephrologist pp    Type 1 diabetes mellitus with diabetic chronic kidney disease  - Takes Lantus 20 u QHS  - Carb ratio: 1/9, 1/8, 1/9 for B/L/D respectively  - In setting of induction, on Insulin gtt   - POCT glucose 260 on admission, has improved since starting insulin drip  - Beta-hydroxybutyrate on admission was 0.1        Disposition: As patient meets milestones, will plan to discharge once medical comorbidities are stable.    Sushma K Reddy, MD  Obstetrics  [facility]    Patient seen and examined. Agree with resident assessment and plan.  No complaints. Denies preE symptoms. Reports vaginal bleeding is minimal. Is appropriately sad.    Temp:  [97.2 °F (36.2 °C)-98.8 °F (37.1 °C)] 97.2 °F (36.2 °C)  Pulse:  [76-97] 91  Resp:  [16-20] 18  SpO2:  [97 %-100 %] 98 %  BP: ()/(50-86) 128/72     UOP not documented overnight. Net +1.7L with no UOP recorded    BGs reviewed: elevated in 200s since delivery  K WNL  Cr up to 3.2 this morning  Na 127  Mag therapeutic overnight and this am    T1DM:  - restart insulin now that patient is eating. Detemir qhs with SS insulin with meals  - will check BGs before and after meals and at bedtime    CKD:  - Nephrology following  - Administer Lasix 40mg IV now per recs  - instructed nursing to document UOP     CHTN with superimposed preE:  - continue antihypertensives  - Mg levels and PRN boluses until 24hr PP    Electrolyte imbalances:  - K WNL. Will continue to monitor. T wave abnormalities resolved with normalization of K.   - Na low. Patient receiving NS and is now eating. Will continue to monitor.    PP care:  - continue routine PP care  - SW and  consulted  - IUD placed  after delivery    Karely Muse MD  Maternal Fetal Medicine fellow  PGY-6

## 2020-11-08 NOTE — PROGRESS NOTES
2 hr pp is 302 mg/dL.  Dr. Gamez notified.  Insulin correction dose SS modified; will give 8 units asparte per Dr. Gamez.  Will also administer pt's 5 unit of basal Levemir now.

## 2020-11-08 NOTE — CARE UPDATE
Patient refusing Mag bolus. Discussed level sub therapeutic. Patient states it makes her throw up. Discussed reasoning and recommendation for Magnesium. Patient agrees to bolus in 30 mins, will do. Understands risk of seizures if subtherapeutic.     Upon further discussion patient is depressed and withdrawn. She agrees to psych consult. She was planning to see counselor on Friday when she was admitted. Will place  consult for grief counseling follow up and pysch consult. High risk of pp depression.    Rebekah Gamez M.D.  Obstetrics and Gynecology   PGY-4

## 2020-11-08 NOTE — PLAN OF CARE
Insulin and D5NS running per protocol. Q1h accuchecks. Q4h Mag and CMP. Epidural and dc in place. I/O q1h. Repositioned Q2h. SCDs on. BP now trending 90-100s/50s. Loose BMs today. Catheter care performed at each pericare and linen change. Call light within reach. Pt verbalized understanding of plan of care but needs reinforcement. Mother and significant other at bedside off and on throughout the day. Psych consult discussed with Dr. Uribe based on pt Hx and missing of outpatient appt next Monday. Pt withdrawn today, minimally talkative. Does not discuss baby, only answering questions.

## 2020-11-08 NOTE — PROGRESS NOTES
Progress Note  Nephrology    Admit Date: 11/6/2020   LOS: 2 days     SUBJECTIVE:     Follow-up For: GEORGE on CKD Stage 4    Interval History: She delivered nonviable fetus.  She has been appropriately grieving.  Good UOP.  NS started for hyponatremia, per OB team.  Lasix and HCTZ also given.  Mother at the bedside.  Denies pain.    OBJECTIVE:     Vital Signs (Most Recent)  Temp: 96.6 °F (35.9 °C) (11/08/20 1150)  Pulse: 83 (11/08/20 1200)  Resp: 16 (11/08/20 1150)  BP: 129/62 (11/08/20 1150)  SpO2: 98 % (11/08/20 1150)    Vital Signs Range (Last 24H):  Temp:  [96.6 °F (35.9 °C)-98.8 °F (37.1 °C)]   Pulse:  [76-97]   Resp:  [16-20]   BP: ()/(50-86)   SpO2:  [97 %-100 %]     Review of Systems:   Constitutional: positive for anorexia and fatigue  Eyes: no visual changes  ENT: no nasal congestion or sore throat  Respiratory: no cough or shortness of breath  Cardiovascular: no chest pain or palpitations  Gastrointestinal: no nausea or vomiting, no abdominal pain or change in bowel habits  Musculoskeletal: no arthralgias or myalgias  Neurological: no seizures or tremors  Behavioral/Psych: no auditory or visual hallucinations  Endocrine: no heat or cold intolerance     Physical Exam:  Gen: AAOx3, NAD  HEENT: mmm, sclera anicteric  CV: RRR, no m/r  Resp: CTAB, no rales or wheezes  GI: soft, distended  Extr: 2+ BLE and 1+ RUE edema    Laboratory:  Recent Labs   Lab 11/03/20  1244  11/07/20  2143 11/08/20  0630 11/08/20  1000      < > 129* 127* 125*   K 4.7   < > 4.8 4.7 4.7      < > 106 102 102   CO2 19*   < > 17* 15* 14*   BUN 39*   < > 37* 37* 37*   CREATININE 2.6*   < > 3.0* 3.2* 3.1*   CALCIUM 8.8   < > 8.0* 8.1* 7.8*   PHOS 4.5  --   --   --   --     < > = values in this interval not displayed.     Recent Labs   Lab 11/03/20  1244 11/06/20  1215   WBC 8.90 8.82   HGB 9.3* 8.8*   HCT 29.1* 26.8*   * 404*        Diagnostic Results:  Labs: Reviewed  US: Reviewed    ASSESSMENT/PLAN:      Hyponatremia  - Hypervolemic per clinic exam.  - Stop HCTZ and NS IVFs.  - Start Lasix 40mg IV daily.  - CMPs ordered q 4h per OB.  - Change any IV med riders to NS rather than 1/2NS or D5W.  - Please defer all electrolyte corrections to our nephrology service.    CKD Stage 4/A3  - Progressive decline in renal function and with nephrotic range proteinuria, likely due to long history of uncontrolled HTN and DM.  - Kidney bx has not been done in the past and can consider after pregnancy.  - Now post-partum, can switch heparin PPx to 5000 units SQ tid.  - She is at high risk of requiring RRT in the next 6 months if current renal trends continue.     Hyperkalemia  - Medically treated in the ED.  - With control of hyperglycemia, potassium should also improve.  - Change to low K/diabetic diet.  - Would avoid LR when hyperkalemic as it contains potassium.     Renal Hypertension  - Home regimen includes Coreg 12.5mg bid, HCTZ 25mg/d, Nifedipine 30mg/d.  - Increased Coreg to 25mg bid and increased Nifedipine to 60mg/d.  - Started diuresis with Lasix 40mg IV daily.     T1DM with hyperglycemia  - HgbA1C 10.9%.  - Followed by Dr. Mayo at [facility].  - Insulin titration per primary team.     Anemia of Pregnancy with Iron deficiency  - Has received IV iron as outpatient.     Vitamin D deficiency  - On Ergocalciferol.     Chronic metabolic acidosis due to distal RTA from advanced CKD  - Has not been on bicarb tabs at home in recent weeks.  Resume home dose of 650mg bid.    Thank you for allowing me to participate in the care of this patient.      Hany Liu MD  Nephrology

## 2020-11-08 NOTE — PLAN OF CARE
VSS. Pt afebrile. Fundus firm and midline. Lochia rubra, light. Scheduled medications given as ordered. Patient resting in bed quietly, but will answer questions when asked. Patient grieving due to fetal loss. Will have  follow up with patient today. Tele monitor on. SCDs in place. Active listening and support provided for patient. Plan of care reviewed with patient. No questions at this time. No signs of distress

## 2020-11-09 LAB
ALBUMIN SERPL BCP-MCNC: 1.3 G/DL (ref 3.5–5.2)
ALBUMIN SERPL BCP-MCNC: 1.4 G/DL (ref 3.5–5.2)
ALBUMIN SERPL BCP-MCNC: 1.4 G/DL (ref 3.5–5.2)
ALBUMIN SERPL BCP-MCNC: 1.5 G/DL (ref 3.5–5.2)
ALP SERPL-CCNC: 55 U/L (ref 55–135)
ALP SERPL-CCNC: 60 U/L (ref 55–135)
ALP SERPL-CCNC: 60 U/L (ref 55–135)
ALP SERPL-CCNC: 66 U/L (ref 55–135)
ALT SERPL W/O P-5'-P-CCNC: 15 U/L (ref 10–44)
ALT SERPL W/O P-5'-P-CCNC: 16 U/L (ref 10–44)
ANION GAP SERPL CALC-SCNC: 10 MMOL/L (ref 8–16)
ANION GAP SERPL CALC-SCNC: 8 MMOL/L (ref 8–16)
AST SERPL-CCNC: 17 U/L (ref 10–40)
AST SERPL-CCNC: 17 U/L (ref 10–40)
AST SERPL-CCNC: 20 U/L (ref 10–40)
AST SERPL-CCNC: 21 U/L (ref 10–40)
BILIRUB SERPL-MCNC: 0.1 MG/DL (ref 0.1–1)
BILIRUB SERPL-MCNC: 0.2 MG/DL (ref 0.1–1)
BLD PROD TYP BPU: NORMAL
BLD PROD TYP BPU: NORMAL
BLOOD UNIT EXPIRATION DATE: NORMAL
BLOOD UNIT EXPIRATION DATE: NORMAL
BLOOD UNIT TYPE CODE: 5100
BLOOD UNIT TYPE CODE: 5100
BLOOD UNIT TYPE: NORMAL
BLOOD UNIT TYPE: NORMAL
BUN SERPL-MCNC: 41 MG/DL (ref 6–20)
BUN SERPL-MCNC: 42 MG/DL (ref 6–20)
BUN SERPL-MCNC: 43 MG/DL (ref 6–20)
BUN SERPL-MCNC: 45 MG/DL (ref 6–20)
CALCIUM SERPL-MCNC: 7.6 MG/DL (ref 8.7–10.5)
CALCIUM SERPL-MCNC: 7.7 MG/DL (ref 8.7–10.5)
CHLORIDE SERPL-SCNC: 105 MMOL/L (ref 95–110)
CHLORIDE SERPL-SCNC: 105 MMOL/L (ref 95–110)
CHLORIDE SERPL-SCNC: 106 MMOL/L (ref 95–110)
CHLORIDE SERPL-SCNC: 108 MMOL/L (ref 95–110)
CO2 SERPL-SCNC: 16 MMOL/L (ref 23–29)
CO2 SERPL-SCNC: 16 MMOL/L (ref 23–29)
CO2 SERPL-SCNC: 17 MMOL/L (ref 23–29)
CO2 SERPL-SCNC: 18 MMOL/L (ref 23–29)
CODING SYSTEM: NORMAL
CODING SYSTEM: NORMAL
CREAT SERPL-MCNC: 3.5 MG/DL (ref 0.5–1.4)
CREAT SERPL-MCNC: 3.6 MG/DL (ref 0.5–1.4)
CREAT SERPL-MCNC: 3.6 MG/DL (ref 0.5–1.4)
CREAT SERPL-MCNC: 3.7 MG/DL (ref 0.5–1.4)
DISPENSE STATUS: NORMAL
DISPENSE STATUS: NORMAL
EST. GFR  (AFRICAN AMERICAN): 19 ML/MIN/1.73 M^2
EST. GFR  (AFRICAN AMERICAN): 20 ML/MIN/1.73 M^2
EST. GFR  (NON AFRICAN AMERICAN): 16 ML/MIN/1.73 M^2
EST. GFR  (NON AFRICAN AMERICAN): 17 ML/MIN/1.73 M^2
GLUCOSE SERPL-MCNC: 125 MG/DL (ref 70–110)
GLUCOSE SERPL-MCNC: 141 MG/DL (ref 70–110)
GLUCOSE SERPL-MCNC: 188 MG/DL (ref 70–110)
GLUCOSE SERPL-MCNC: 213 MG/DL (ref 70–110)
MAGNESIUM SERPL-MCNC: 4 MG/DL (ref 1.6–2.6)
MAGNESIUM SERPL-MCNC: 4.1 MG/DL (ref 1.6–2.6)
MAGNESIUM SERPL-MCNC: 4.1 MG/DL (ref 1.6–2.6)
MAGNESIUM SERPL-MCNC: 4.3 MG/DL (ref 1.6–2.6)
MAGNESIUM SERPL-MCNC: 4.4 MG/DL (ref 1.6–2.6)
MAGNESIUM SERPL-MCNC: 4.5 MG/DL (ref 1.6–2.6)
POCT GLUCOSE: 140 MG/DL (ref 70–110)
POCT GLUCOSE: 177 MG/DL (ref 70–110)
POCT GLUCOSE: 179 MG/DL (ref 70–110)
POCT GLUCOSE: 216 MG/DL (ref 70–110)
POCT GLUCOSE: 218 MG/DL (ref 70–110)
POCT GLUCOSE: 296 MG/DL (ref 70–110)
POCT GLUCOSE: 70 MG/DL (ref 70–110)
POTASSIUM SERPL-SCNC: 4.6 MMOL/L (ref 3.5–5.1)
POTASSIUM SERPL-SCNC: 4.7 MMOL/L (ref 3.5–5.1)
POTASSIUM SERPL-SCNC: 4.8 MMOL/L (ref 3.5–5.1)
POTASSIUM SERPL-SCNC: 4.9 MMOL/L (ref 3.5–5.1)
PROT SERPL-MCNC: 4.7 G/DL (ref 6–8.4)
PROT SERPL-MCNC: 5.1 G/DL (ref 6–8.4)
PROT SERPL-MCNC: 5.2 G/DL (ref 6–8.4)
PROT SERPL-MCNC: 5.2 G/DL (ref 6–8.4)
SODIUM SERPL-SCNC: 130 MMOL/L (ref 136–145)
SODIUM SERPL-SCNC: 131 MMOL/L (ref 136–145)
SODIUM SERPL-SCNC: 132 MMOL/L (ref 136–145)
SODIUM SERPL-SCNC: 132 MMOL/L (ref 136–145)
TRANS ERYTHROCYTES VOL PATIENT: NORMAL ML
TRANS ERYTHROCYTES VOL PATIENT: NORMAL ML

## 2020-11-09 PROCEDURE — 25000003 PHARM REV CODE 250: Performed by: STUDENT IN AN ORGANIZED HEALTH CARE EDUCATION/TRAINING PROGRAM

## 2020-11-09 PROCEDURE — C9399 UNCLASSIFIED DRUGS OR BIOLOG: HCPCS | Performed by: STUDENT IN AN ORGANIZED HEALTH CARE EDUCATION/TRAINING PROGRAM

## 2020-11-09 PROCEDURE — G0427 INPT/ED TELECONSULT70: HCPCS | Mod: 95,,, | Performed by: PSYCHIATRY & NEUROLOGY

## 2020-11-09 PROCEDURE — 63600175 PHARM REV CODE 636 W HCPCS: Performed by: STUDENT IN AN ORGANIZED HEALTH CARE EDUCATION/TRAINING PROGRAM

## 2020-11-09 PROCEDURE — 99233 PR SUBSEQUENT HOSPITAL CARE,LEVL III: ICD-10-PCS | Mod: ,,, | Performed by: OBSTETRICS & GYNECOLOGY

## 2020-11-09 PROCEDURE — 83735 ASSAY OF MAGNESIUM: CPT | Mod: 91

## 2020-11-09 PROCEDURE — 99233 SBSQ HOSP IP/OBS HIGH 50: CPT | Mod: ,,, | Performed by: OBSTETRICS & GYNECOLOGY

## 2020-11-09 PROCEDURE — 80053 COMPREHEN METABOLIC PANEL: CPT

## 2020-11-09 PROCEDURE — G0427 PR INPT TELEHEALTH CON 70/>M: ICD-10-PCS | Mod: 95,,, | Performed by: PSYCHIATRY & NEUROLOGY

## 2020-11-09 PROCEDURE — 80053 COMPREHEN METABOLIC PANEL: CPT | Mod: 91

## 2020-11-09 PROCEDURE — 11000001 HC ACUTE MED/SURG PRIVATE ROOM

## 2020-11-09 PROCEDURE — 36415 COLL VENOUS BLD VENIPUNCTURE: CPT

## 2020-11-09 RX ORDER — ESCITALOPRAM OXALATE 10 MG/1
20 TABLET ORAL DAILY
Status: DISCONTINUED | OUTPATIENT
Start: 2020-11-10 | End: 2020-11-11 | Stop reason: HOSPADM

## 2020-11-09 RX ORDER — INSULIN ASPART 100 [IU]/ML
6 INJECTION, SOLUTION INTRAVENOUS; SUBCUTANEOUS ONCE
Status: COMPLETED | OUTPATIENT
Start: 2020-11-09 | End: 2020-11-09

## 2020-11-09 RX ORDER — FUROSEMIDE 40 MG/1
40 TABLET ORAL EVERY OTHER DAY
Status: DISCONTINUED | OUTPATIENT
Start: 2020-11-10 | End: 2020-11-11 | Stop reason: HOSPADM

## 2020-11-09 RX ORDER — TRAZODONE HYDROCHLORIDE 50 MG/1
50 TABLET ORAL NIGHTLY PRN
Status: DISCONTINUED | OUTPATIENT
Start: 2020-11-09 | End: 2020-11-11 | Stop reason: HOSPADM

## 2020-11-09 RX ORDER — INSULIN ASPART 100 [IU]/ML
1-10 INJECTION, SOLUTION INTRAVENOUS; SUBCUTANEOUS
Status: DISCONTINUED | OUTPATIENT
Start: 2020-11-09 | End: 2020-11-09

## 2020-11-09 RX ADMIN — SODIUM BICARBONATE 650 MG TABLET 650 MG: at 09:11

## 2020-11-09 RX ADMIN — HEPARIN SODIUM 5000 UNITS: 5000 INJECTION INTRAVENOUS; SUBCUTANEOUS at 03:11

## 2020-11-09 RX ADMIN — INSULIN ASPART 2 UNITS: 100 INJECTION, SOLUTION INTRAVENOUS; SUBCUTANEOUS at 09:11

## 2020-11-09 RX ADMIN — INSULIN DETEMIR 7 UNITS: 100 INJECTION, SOLUTION SUBCUTANEOUS at 08:11

## 2020-11-09 RX ADMIN — INSULIN ASPART 6 UNITS: 100 INJECTION, SOLUTION INTRAVENOUS; SUBCUTANEOUS at 07:11

## 2020-11-09 RX ADMIN — CARVEDILOL 25 MG: 12.5 TABLET, FILM COATED ORAL at 09:11

## 2020-11-09 RX ADMIN — HEPARIN SODIUM 5000 UNITS: 5000 INJECTION INTRAVENOUS; SUBCUTANEOUS at 09:11

## 2020-11-09 RX ADMIN — INSULIN ASPART 10 UNITS: 100 INJECTION, SOLUTION INTRAVENOUS; SUBCUTANEOUS at 12:11

## 2020-11-09 RX ADMIN — CITALOPRAM HYDROBROMIDE 20 MG: 20 TABLET ORAL at 09:11

## 2020-11-09 RX ADMIN — FUROSEMIDE 40 MG: 10 INJECTION, SOLUTION INTRAMUSCULAR; INTRAVENOUS at 09:11

## 2020-11-09 RX ADMIN — INSULIN DETEMIR 8 UNITS: 100 INJECTION, SOLUTION SUBCUTANEOUS at 09:11

## 2020-11-09 RX ADMIN — TRAZODONE HYDROCHLORIDE 50 MG: 50 TABLET ORAL at 09:11

## 2020-11-09 RX ADMIN — INSULIN ASPART 6 UNITS: 100 INJECTION, SOLUTION INTRAVENOUS; SUBCUTANEOUS at 11:11

## 2020-11-09 NOTE — NURSING
Critical lab value received. Magnesium 6.0. Dr Johansen Notified. No additional interventions given.

## 2020-11-09 NOTE — ASSESSMENT & PLAN NOTE
- BP: (112-141)/(58-77) 128/68  - AST/ALT wnl;  Plt 444  - Creatinine increased from 2.0 to 3.0 on admission   - 3.2 > 3.5  - s/p Procardia 20, Labetalol 20/40/80/20/40/80   - home procardia increased to 60XL   - Coreg increased to BID dosing. Continue home HCTZ   - s/p magnesium for seizure ppx

## 2020-11-09 NOTE — ASSESSMENT & PLAN NOTE
- BP: (126-148)/(65-80) 145/79  - AST/ALT wnl;  Plt 444  - Creatinine increased from 2.0 to 3.0 on admission   - 3.2 > 3.6  - s/p Procardia 20, Labetalol 20/40/80/20/40/80   - home procardia increased to 60XL   - Coreg increased to BID dosing.Will hold HCTZ per nephro recs.   - s/p magnesium for seizure ppx

## 2020-11-09 NOTE — SUBJECTIVE & OBJECTIVE
Interval History: Patient doing well this morning without complaints. Has decided to talk to telepsych. Electrolytes continue to trend in right direction. Creatinine up to 3.5 Patient asymptomatic. Voided 900 cc/overnight.    Objective:     Vital Signs (Most Recent):  Temp: 97.5 °F (36.4 °C) (11/09/20 0357)  Pulse: 88 (11/09/20 0400)  Resp: 16 (11/09/20 0357)  BP: 128/68 (11/09/20 0357)  SpO2: 95 % (11/08/20 1957) Vital Signs (24h Range):  Temp:  [96.6 °F (35.9 °C)-98.2 °F (36.8 °C)] 97.5 °F (36.4 °C)  Pulse:  [83-98] 88  Resp:  [16-18] 16  SpO2:  [95 %-100 %] 95 %  BP: (112-141)/(58-77) 128/68     Weight: 75 kg (165 lb 5.5 oz)  Body mass index is 28.38 kg/m².      Intake/Output Summary (Last 24 hours) at 11/9/2020 0631  Last data filed at 11/9/2020 0500  Gross per 24 hour   Intake 486.25 ml   Output 2500 ml   Net -2013.75 ml           Significant Labs:  Lab Results   Component Value Date    GROUPTRH O POS 11/07/2020    HEPBSAG Negative 07/29/2020     No results for input(s): HGB, HCT in the last 48 hours.        Physical Exam:   Constitutional: She is oriented to person, place, and time. She appears well-developed and well-nourished.    HENT:   Head: Normocephalic and atraumatic.    Eyes: Pupils are equal, round, and reactive to light. EOM are normal.    Neck: Normal range of motion. Neck supple.    Cardiovascular: Normal rate.     Pulmonary/Chest: Effort normal.        Abdominal: Soft.             Musculoskeletal: Normal range of motion and moves all extremeties.       Neurological: She is alert and oriented to person, place, and time.    Skin: Skin is warm and dry.

## 2020-11-09 NOTE — ASSESSMENT & PLAN NOTE
- Most recent Creatinine 3.5.  - Sodium Bicarb 650 BID.  - Nephro following renal function & electrolytes. Appreciated continued recs.

## 2020-11-09 NOTE — ASSESSMENT & PLAN NOTE
- likely secondary to renal disease and worsened renal function  - K: 6.2 on admission   - decreased now to 4.7. Remaining stable at 4.6 x 3 checks.   - Peaked T waves on EKG. Calcium gluconate/insulin/albuterol given on admission. One recurrence in peaked T waves since admission & protocol repeated for hyperkalemia.   - Remains stable currently.

## 2020-11-09 NOTE — CONSULTS
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 5.1 mmol/L    Chloride 108 95 - 110 mmol/L    CO2 17 (L) 23 - 29 mmol/L    Glucose 61 (L) 70 - 110 mg/dL    BUN 36 (H) 6 - 20 mg/dL    Creatinine 2.9 (H) 0.5 - 1.4 mg/dL    Calcium 8.2 (L) 8.7 - 10.5 mg/dL    Total Protein 5.4 (L) 6.0 - 8.4 g/dL    Albumin 1.6 (L) 3.5 - 5.2 g/dL    Total Bilirubin 0.2 0.1 - 1.0 mg/dL    Alkaline Phosphatase 56 55 - 135 U/L    AST 32 10 - 40 U/L    ALT 20 10 - 44 U/L    Anion Gap 9 8 - 16 mmol/L    eGFR if African American 25 (A) >60 mL/min/1.73 m^2    eGFR if non African American 22 (A) >60 mL/min/1.73 m^2   Magnesium    Collection Time: 11/07/20  3:50 PM   Result Value Ref Range    Magnesium 4.6 (H) 1.6 - 2.6 mg/dL   POCT glucose    Collection Time: 11/07/20  4:07 PM   Result Value Ref Range    POCT Glucose 53 (L) 70 - 110 mg/dL   POCT glucose    Collection Time: 11/07/20  4:20 PM   Result Value Ref Range    POCT Glucose 56 (L) 70 - 110 mg/dL   POCT glucose    Collection Time: 11/07/20  4:37 PM   Result Value Ref Range    POCT Glucose 120 (H) 70 - 110 mg/dL   POCT glucose    Collection Time: 11/07/20  5:00 PM   Result Value Ref Range    POCT Glucose 108 70 - 110 mg/dL   Potassium    Collection Time: 11/07/20  5:49 PM   Result  Value Ref Range    Potassium 4.9 3.5 - 5.1 mmol/L   POCT glucose    Collection Time: 11/07/20  6:03 PM   Result Value Ref Range    POCT Glucose 95 70 - 110 mg/dL   POCT glucose    Collection Time: 11/07/20  8:01 PM   Result Value Ref Range    POCT Glucose 41 (LL) 70 - 110 mg/dL   POCT glucose    Collection Time: 11/07/20  8:27 PM   Result Value Ref Range    POCT Glucose 97 70 - 110 mg/dL   POCT glucose    Collection Time: 11/07/20  9:24 PM   Result Value Ref Range    POCT Glucose 62 (L) 70 - 110 mg/dL   Comprehensive Metabolic Panel    Collection Time: 11/07/20  9:43 PM   Result Value Ref Range    Sodium 129 (L) 136 - 145 mmol/L    Potassium 4.8 3.5 - 5.1 mmol/L    Chloride 106 95 - 110 mmol/L    CO2 17 (L) 23 - 29 mmol/L    Glucose 63 (L) 70 - 110 mg/dL    BUN 37 (H) 6 - 20 mg/dL    Creatinine 3.0 (H) 0.5 - 1.4 mg/dL    Calcium 8.0 (L) 8.7 - 10.5 mg/dL    Total Protein 5.4 (L) 6.0 - 8.4 g/dL    Albumin 1.5 (L) 3.5 - 5.2 g/dL    Total Bilirubin 0.1 0.1 - 1.0 mg/dL    Alkaline Phosphatase 49 (L) 55 - 135 U/L    AST 32 10 - 40 U/L    ALT 21 10 - 44 U/L    Anion Gap 6 (L) 8 - 16 mmol/L    eGFR if African American 24 (A) >60 mL/min/1.73 m^2    eGFR if non African American 21 (A) >60 mL/min/1.73 m^2   Magnesium    Collection Time: 11/07/20  9:43 PM   Result Value Ref Range    Magnesium 4.5 (H) 1.6 - 2.6 mg/dL   POCT glucose    Collection Time: 11/07/20 10:17 PM   Result Value Ref Range    POCT Glucose 64 (L) 70 - 110 mg/dL   POCT glucose    Collection Time: 11/07/20 11:43 PM   Result Value Ref Range    POCT Glucose 105 70 - 110 mg/dL   POCT glucose    Collection Time: 11/08/20  1:50 AM   Result Value Ref Range    POCT Glucose 84 70 - 110 mg/dL   POCT glucose    Collection Time: 11/08/20  3:04 AM   Result Value Ref Range    POCT Glucose 180 (H) 70 - 110 mg/dL   POCT glucose    Collection Time: 11/08/20  4:59 AM   Result Value Ref Range    POCT Glucose 238 (H) 70 - 110 mg/dL   Comprehensive metabolic panel    Collection  Time: 11/08/20  6:30 AM   Result Value Ref Range    Sodium 127 (L) 136 - 145 mmol/L    Potassium 4.7 3.5 - 5.1 mmol/L    Chloride 102 95 - 110 mmol/L    CO2 15 (L) 23 - 29 mmol/L    Glucose 243 (H) 70 - 110 mg/dL    BUN 37 (H) 6 - 20 mg/dL    Creatinine 3.2 (H) 0.5 - 1.4 mg/dL    Calcium 8.1 (L) 8.7 - 10.5 mg/dL    Total Protein 5.8 (L) 6.0 - 8.4 g/dL    Albumin 1.6 (L) 3.5 - 5.2 g/dL    Total Bilirubin 0.1 0.1 - 1.0 mg/dL    Alkaline Phosphatase 61 55 - 135 U/L    AST 31 10 - 40 U/L    ALT 23 10 - 44 U/L    Anion Gap 10 8 - 16 mmol/L    eGFR if African American 22 (A) >60 mL/min/1.73 m^2    eGFR if non African American 19 (A) >60 mL/min/1.73 m^2   Magnesium    Collection Time: 11/08/20  6:30 AM   Result Value Ref Range    Magnesium 4.2 (H) 1.6 - 2.6 mg/dL   POCT glucose    Collection Time: 11/08/20  9:18 AM   Result Value Ref Range    POCT Glucose 235 (H) 70 - 110 mg/dL   Comprehensive metabolic panel    Collection Time: 11/08/20 10:00 AM   Result Value Ref Range    Sodium 125 (L) 136 - 145 mmol/L    Potassium 4.7 3.5 - 5.1 mmol/L    Chloride 102 95 - 110 mmol/L    CO2 14 (L) 23 - 29 mmol/L    Glucose 240 (H) 70 - 110 mg/dL    BUN 37 (H) 6 - 20 mg/dL    Creatinine 3.1 (H) 0.5 - 1.4 mg/dL    Calcium 7.8 (L) 8.7 - 10.5 mg/dL    Total Protein 5.2 (L) 6.0 - 8.4 g/dL    Albumin 1.5 (L) 3.5 - 5.2 g/dL    Total Bilirubin 0.2 0.1 - 1.0 mg/dL    Alkaline Phosphatase 59 55 - 135 U/L    AST 25 10 - 40 U/L    ALT 18 10 - 44 U/L    Anion Gap 9 8 - 16 mmol/L    eGFR if African American 23 (A) >60 mL/min/1.73 m^2    eGFR if non African American 20 (A) >60 mL/min/1.73 m^2   Magnesium    Collection Time: 11/08/20 10:00 AM   Result Value Ref Range    Magnesium 3.8 (H) 1.6 - 2.6 mg/dL   POCT glucose    Collection Time: 11/08/20 11:45 AM   Result Value Ref Range    POCT Glucose 302 (H) 70 - 110 mg/dL   Comprehensive metabolic panel    Collection Time: 11/08/20  1:09 PM   Result Value Ref Range    Sodium 127 (L) 136 - 145 mmol/L     Potassium 4.8 3.5 - 5.1 mmol/L    Chloride 102 95 - 110 mmol/L    CO2 14 (L) 23 - 29 mmol/L    Glucose 246 (H) 70 - 110 mg/dL    BUN 38 (H) 6 - 20 mg/dL    Creatinine 3.2 (H) 0.5 - 1.4 mg/dL    Calcium 7.5 (L) 8.7 - 10.5 mg/dL    Total Protein 4.9 (L) 6.0 - 8.4 g/dL    Albumin 1.4 (L) 3.5 - 5.2 g/dL    Total Bilirubin 0.2 0.1 - 1.0 mg/dL    Alkaline Phosphatase 56 55 - 135 U/L    AST 21 10 - 40 U/L    ALT 16 10 - 44 U/L    Anion Gap 11 8 - 16 mmol/L    eGFR if African American 22 (A) >60 mL/min/1.73 m^2    eGFR if non African American 19 (A) >60 mL/min/1.73 m^2   Magnesium    Collection Time: 11/08/20  1:09 PM   Result Value Ref Range    Magnesium 3.7 (H) 1.6 - 2.6 mg/dL   POCT glucose    Collection Time: 11/08/20  2:51 PM   Result Value Ref Range    POCT Glucose 188 (H) 70 - 110 mg/dL   Comprehensive metabolic panel    Collection Time: 11/08/20  5:08 PM   Result Value Ref Range    Sodium 127 (L) 136 - 145 mmol/L    Potassium 4.6 3.5 - 5.1 mmol/L    Chloride 103 95 - 110 mmol/L    CO2 15 (L) 23 - 29 mmol/L    Glucose 126 (H) 70 - 110 mg/dL    BUN 39 (H) 6 - 20 mg/dL    Creatinine 3.1 (H) 0.5 - 1.4 mg/dL    Calcium 7.8 (L) 8.7 - 10.5 mg/dL    Total Protein 5.3 (L) 6.0 - 8.4 g/dL    Albumin 1.5 (L) 3.5 - 5.2 g/dL    Total Bilirubin 0.2 0.1 - 1.0 mg/dL    Alkaline Phosphatase 62 55 - 135 U/L    AST 22 10 - 40 U/L    ALT 17 10 - 44 U/L    Anion Gap 9 8 - 16 mmol/L    eGFR if African American 23 (A) >60 mL/min/1.73 m^2    eGFR if non African American 20 (A) >60 mL/min/1.73 m^2   Magnesium    Collection Time: 11/08/20  5:08 PM   Result Value Ref Range    Magnesium 6.0 (HH) 1.6 - 2.6 mg/dL   POCT glucose    Collection Time: 11/08/20  5:54 PM   Result Value Ref Range    POCT Glucose 163 (H) 70 - 110 mg/dL   POCT glucose    Collection Time: 11/08/20  8:05 PM   Result Value Ref Range    POCT Glucose 182 (H) 70 - 110 mg/dL   Comprehensive metabolic panel    Collection Time: 11/08/20  9:12 PM   Result Value Ref Range    Sodium  129 (L) 136 - 145 mmol/L    Potassium 4.6 3.5 - 5.1 mmol/L    Chloride 104 95 - 110 mmol/L    CO2 15 (L) 23 - 29 mmol/L    Glucose 176 (H) 70 - 110 mg/dL    BUN 40 (H) 6 - 20 mg/dL    Creatinine 3.2 (H) 0.5 - 1.4 mg/dL    Calcium 7.7 (L) 8.7 - 10.5 mg/dL    Total Protein 5.1 (L) 6.0 - 8.4 g/dL    Albumin 1.4 (L) 3.5 - 5.2 g/dL    Total Bilirubin 0.1 0.1 - 1.0 mg/dL    Alkaline Phosphatase 67 55 - 135 U/L    AST 20 10 - 40 U/L    ALT 18 10 - 44 U/L    Anion Gap 10 8 - 16 mmol/L    eGFR if African American 22 (A) >60 mL/min/1.73 m^2    eGFR if non African American 19 (A) >60 mL/min/1.73 m^2   Magnesium    Collection Time: 11/08/20  9:12 PM   Result Value Ref Range    Magnesium 4.7 (H) 1.6 - 2.6 mg/dL   POCT glucose    Collection Time: 11/08/20 10:02 PM   Result Value Ref Range    POCT Glucose 170 (H) 70 - 110 mg/dL   POCT glucose    Collection Time: 11/08/20 11:59 PM   Result Value Ref Range    POCT Glucose 140 (H) 70 - 110 mg/dL   Comprehensive metabolic panel    Collection Time: 11/09/20  1:19 AM   Result Value Ref Range    Sodium 130 (L) 136 - 145 mmol/L    Potassium 4.6 3.5 - 5.1 mmol/L    Chloride 105 95 - 110 mmol/L    CO2 17 (L) 23 - 29 mmol/L    Glucose 141 (H) 70 - 110 mg/dL    BUN 41 (H) 6 - 20 mg/dL    Creatinine 3.5 (H) 0.5 - 1.4 mg/dL    Calcium 7.6 (L) 8.7 - 10.5 mg/dL    Total Protein 4.7 (L) 6.0 - 8.4 g/dL    Albumin 1.3 (L) 3.5 - 5.2 g/dL    Total Bilirubin 0.1 0.1 - 1.0 mg/dL    Alkaline Phosphatase 55 55 - 135 U/L    AST 17 10 - 40 U/L    ALT 16 10 - 44 U/L    Anion Gap 8 8 - 16 mmol/L    eGFR if African American 20 (A) >60 mL/min/1.73 m^2    eGFR if non African American 17 (A) >60 mL/min/1.73 m^2   Magnesium    Collection Time: 11/09/20  1:19 AM   Result Value Ref Range    Magnesium 4.5 (H) 1.6 - 2.6 mg/dL   Comprehensive metabolic panel    Collection Time: 11/09/20  5:48 AM   Result Value Ref Range    Sodium 131 (L) 136 - 145 mmol/L    Potassium 4.8 3.5 - 5.1 mmol/L    Chloride 105 95 - 110 mmol/L     CO2 16 (L) 23 - 29 mmol/L    Glucose 188 (H) 70 - 110 mg/dL    BUN 42 (H) 6 - 20 mg/dL    Creatinine 3.7 (H) 0.5 - 1.4 mg/dL    Calcium 7.6 (L) 8.7 - 10.5 mg/dL    Total Protein 5.1 (L) 6.0 - 8.4 g/dL    Albumin 1.4 (L) 3.5 - 5.2 g/dL    Total Bilirubin 0.2 0.1 - 1.0 mg/dL    Alkaline Phosphatase 60 55 - 135 U/L    AST 17 10 - 40 U/L    ALT 15 10 - 44 U/L    Anion Gap 10 8 - 16 mmol/L    eGFR if African American 19 (A) >60 mL/min/1.73 m^2    eGFR if non African American 16 (A) >60 mL/min/1.73 m^2   Magnesium    Collection Time: 11/09/20  5:48 AM   Result Value Ref Range    Magnesium 4.3 (H) 1.6 - 2.6 mg/dL   POCT glucose    Collection Time: 11/09/20  8:34 AM   Result Value Ref Range    POCT Glucose 218 (H) 70 - 110 mg/dL   Comprehensive metabolic panel    Collection Time: 11/09/20  8:53 AM   Result Value Ref Range    Sodium 132 (L) 136 - 145 mmol/L    Potassium 4.9 3.5 - 5.1 mmol/L    Chloride 106 95 - 110 mmol/L    CO2 18 (L) 23 - 29 mmol/L    Glucose 213 (H) 70 - 110 mg/dL    BUN 43 (H) 6 - 20 mg/dL    Creatinine 3.6 (H) 0.5 - 1.4 mg/dL    Calcium 7.7 (L) 8.7 - 10.5 mg/dL    Total Protein 5.2 (L) 6.0 - 8.4 g/dL    Albumin 1.4 (L) 3.5 - 5.2 g/dL    Total Bilirubin 0.1 0.1 - 1.0 mg/dL    Alkaline Phosphatase 66 55 - 135 U/L    AST 20 10 - 40 U/L    ALT 15 10 - 44 U/L    Anion Gap 8 8 - 16 mmol/L    eGFR if African American 19 (A) >60 mL/min/1.73 m^2    eGFR if non African American 17 (A) >60 mL/min/1.73 m^2   Magnesium    Collection Time: 11/09/20  8:53 AM   Result Value Ref Range    Magnesium 4.4 (H) 1.6 - 2.6 mg/dL   POCT glucose    Collection Time: 11/09/20 10:52 AM   Result Value Ref Range    POCT Glucose 296 (H) 70 - 110 mg/dL   POCT glucose    Collection Time: 11/09/20 12:56 PM   Result Value Ref Range    POCT Glucose 216 (H) 70 - 110 mg/dL   Magnesium    Collection Time: 11/09/20  1:09 PM   Result Value Ref Range    Magnesium 4.1 (H) 1.6 - 2.6 mg/dL      No results found for: PHENYTOIN, PHENOBARB,  "VALPROATE, CBMZ      EXAMINATION    VITALS   Vitals:    11/09/20 0600 11/09/20 0750 11/09/20 0752 11/09/20 1200   BP:   127/65    Pulse: 94  89    Resp:  17     Temp:  99 °F (37.2 °C)  98.7 °F (37.1 °C)   TempSrc:  Oral  Oral   SpO2:  98%     Weight:       Height:            CONSTITUTIONAL  General Appearance: NAD, unremarkable, age appropriate, normal weight, lying in bed    MUSCULOSKELETAL  Muscle Strength and Tone: WNL  Abnormal Involuntary Movements: none observed   Gait and Station: WNL; non-ataxic     PSYCHIATRIC   Behavior/Cooperation:  friendly and cooperative, eye contact normal  Speech:  normal tone, normal rate, normal pitch, normal volume  Language: grossly intact, able to name, able to repeat with spontaneous speech  Mood: "OK"  Affect:  congruent with mood and mildly constricted ; intermittently smiled and laughed appropriately   Associations: intact; no JB  Thought Process: normal and logical ; linear  Thought Content: normal, no suicidality, no homicidality, no delusions, no paranoia, no AH/VH (no RIS observed)  Sensorium:  Awake  Alert and Oriented: to person, place, situation, day of week, month of year, year  Memory: 3/3 immediate, 3/3 at 5 minutes    Recent: Intact; able to report recent events   Remote: Intact; Named 3/3 past presidents   Attention/concentration: appropriate for age/education. Able to spell [id number] & [id number]   Similarities:  Intact; (difference between apple and orange?)  Abstract reasoning: Intact  Insight: Intact  Judgment: Intact    CAM ICU Delirium Assessment - NEGATIVE      Is the patient aware of the biomedical complications associated with substance abuse and mental illness? yes      MEDICAL DECISION MAKING    ASSESSMENT      Unspecified Depressive Disorder  (Rule out MDD vs Adjustment Disorder vs Grief/Bereavement)  Unspecified Anxiety Disorder  Insomnia      RECOMMENDATIONS       - patient does not currently meet PEC/CEC criteria due to not being an imminent threat " to self/others and not being gravely disabled 2/2 mental illness.      - Change Celexa 20 mg PO daily to Lexapro 20 mg PO daily (comparable to Celexa 40 mg PO daily with better AE profile) for depression/anxiety (as noted in HPI above) (discussed risks/benefits/alt vs no treatment with patient; patient endorses hx of improvement on Celexa)    - Begin Trazodone 50 mg PO QHS PRN for insomnia (discussed risks/benefits/alt vs no treatment with patient)     - Psychotherapy was performed with patient as noted above      - Please have CM/SW assist patient with outpatient mental health f/u for s/p discharge from this facility (will place CM/SW consult)      - Patient was instructed to call 911 and return to the nearest ED if she begins feeling suicidal, homicidal, or gravely disabled (for s/p this hospitalization)     - Thank you for this consult        Time with patient: 71 minutes     More than 50% of the time was spent counseling/coordinating care    Consulting clinician was informed of the encounter and consult note.    Consultation ended: 11/9/2020 at 3:58 PM       William Piedra MD   Psychiatry  [facility]  11/09/2020

## 2020-11-09 NOTE — ASSESSMENT & PLAN NOTE
- pt has h/o restrictive lung disease; PFTs early this pregnancy were wnl   “You can access the FollowHealth Patient Portal, offered by [facility], by registering with the following website: [email or web address]”

## 2020-11-09 NOTE — ASSESSMENT & PLAN NOTE
- CKD IIIB - IV  - Baseline Creatinine 2.0  - Increased Creatinine to 3.0 consistent with worsening renal function in setting of PreE w/ SF   - Cr continuing to trend up. Now 3.5  - Severe proteinuria. On Lovenox. Held in setting of induction.    - Patient now on Heparin 5000u TID  - Nephrology consulted, appreciate recs   - rec d/c IVF, and avoiding LR when hyperkalemic    - Lasix 40 IV daily postpartum    - Bicarb 650 BID  - pt may need RRT, will need to f/u with her nephrologist pp

## 2020-11-09 NOTE — ASSESSMENT & PLAN NOTE
- Home regiment: Lantus 20 u QHS w/ Carb ratio: 1/9, 1/8, 1/9 for B/L/D respectively  - Currently on Detemir 7AM/7QHS inpatient w/ sliding scale as needed  - Beta-hydroxybutyrate on admission was 0.1  - Inpatient carb ratio 1:10 w/ all meals

## 2020-11-09 NOTE — PROGRESS NOTES
Progress Note  Nephrology    Admit Date: 11/6/2020   LOS: 3 days     SUBJECTIVE:     Follow-up For: GEORGE on CKD Stage 4    Interval History:  Eating breakfast this am with mother at bedside.  Labs noted.  UOP stable.  No c/o.     OBJECTIVE:     Vital Signs (Most Recent)  Temp: 99 °F (37.2 °C) (11/09/20 0750)  Pulse: 89 (11/09/20 0752)  Resp: 17 (11/09/20 0750)  BP: 127/65 (11/09/20 0752)  SpO2: 98 % (11/09/20 0750)    Vital Signs Range (Last 24H):  Temp:  [96.6 °F (35.9 °C)-99 °F (37.2 °C)]   Pulse:  [83-98]   Resp:  [16-17]   BP: (112-141)/(58-77)   SpO2:  [95 %-98 %]       Intake/Output Summary (Last 24 hours) at 11/9/2020 0958  Last data filed at 11/9/2020 0500  Gross per 24 hour   Intake 486.25 ml   Output 2500 ml   Net -2013.75 ml     Review of Systems:   Constitutional: positive for anorexia and fatigue  Eyes: no visual changes  ENT: no nasal congestion or sore throat  Respiratory: no cough or shortness of breath  Cardiovascular: no chest pain or palpitations  Gastrointestinal: no nausea or vomiting, no abdominal pain or change in bowel habits  Musculoskeletal: no arthralgias or myalgias  Neurological: no seizures or tremors  Behavioral/Psych: no auditory or visual hallucinations  Endocrine: no heat or cold intolerance     Physical Exam:  Gen: AAOx3, NAD  HEENT: mmm, sclera anicteric  CV: RRR, no m/r  Resp: CTAB, no rales or wheezes  GI: soft, non-distended  Extr: 2+ BLE and 1+ RUE edema    Laboratory:  Recent Labs   Lab 11/03/20  1244  11/09/20  0119 11/09/20  0548 11/09/20  0853      < > 130* 131* 132*   K 4.7   < > 4.6 4.8 4.9      < > 105 105 106   CO2 19*   < > 17* 16* 18*   BUN 39*   < > 41* 42* 43*   CREATININE 2.6*   < > 3.5* 3.7* 3.6*   CALCIUM 8.8   < > 7.6* 7.6* 7.7*   PHOS 4.5  --   --   --   --     < > = values in this interval not displayed.     Recent Labs   Lab 11/03/20  1244 11/06/20  1215   WBC 8.90 8.82   HGB 9.3* 8.8*   HCT 29.1* 26.8*   * 404*        Diagnostic  Results:  Labs: Reviewed  US: Reviewed    ASSESSMENT/PLAN:     Resolving Hyponatremia  - Hypervolemic per clinic exam.  - Stopped HCTZ and NS IVFs.  - Started Lasix 40mg IV daily and will change to oral QOD.  - Please defer all electrolyte corrections to our nephrology service.    CKD Stage 4/A3  - Progressive decline in renal function and with nephrotic range proteinuria, likely due to long history of uncontrolled HTN and DM.  - Kidney bx has not been done in the past and can consider after pregnancy.  - Now post-partum, switched heparin PPx to 5000 units SQ tid.  - She is at high risk of requiring RRT in the next 6 months if current renal trends continue.  -Creat rise noted likely from diuretics  -follow trends.   -consider ACE/ARB when Creat stabilizes.   -Renally dose meds, avoid nephrotoxins, and monitor I/O's closely.       Hyperkalemia-resolved  - Medically treated in the ED.  - With control of hyperglycemia, potassium should also improve.  - Changed to low K/diabetic diet.  - Would avoid LR when hyperkalemic as it contains potassium.     Renal Hypertension  - Home regimen includes Coreg 12.5mg bid, HCTZ 25mg/d, Nifedipine 30mg/d.  - Increased Coreg to 25mg bid and increased Nifedipine to 60mg/d.  - Started diuresis with Lasix 40mg IV daily.  -hold parameters placed as BP much improved.      T1DM with hyperglycemia  - HgbA1C 10.9%.  - Followed by Dr. Mayo at [facility].  - Insulin titration per primary team.     Anemia of Pregnancy with Iron deficiency  - Has received IV iron as outpatient.     Vitamin D deficiency  - On Ergocalciferol.     Chronic metabolic acidosis due to distal RTA from advanced CKD  - Has not been on bicarb tabs at home in recent weeks.  Resumed home dose of 650mg bid.    Thank you for allowing me to participate in the care of this patient.      Dinh Stewart, ACNP  Nephrology

## 2020-11-09 NOTE — ASSESSMENT & PLAN NOTE
- continue home celexa  - will need close f/u for mood check following delivery   - Will consult telepsych this AM. Patient in agreement.

## 2020-11-09 NOTE — PLAN OF CARE
Sw attempted to complete consult with pt but pt did not want to speak with sw as of now. Sw provided pt's mom with sw number and sw encouraged family to contact with any needs.     Altaf Cedeño [facility]  NICU   Phone [phone] Ext. [phone]  [email or web address]

## 2020-11-09 NOTE — PLAN OF CARE
No significant events throughout the night. Remains free from fall or injury. Denies c/o pain. Patient slept peacefully throughout the night. VSS. Plan of care reviewed with patient and family. All questions answered.

## 2020-11-09 NOTE — PLAN OF CARE
provided a compassionate presence for patient as well as reflective listening, prayer support, grief care and further conversation regarding burial arrangements for family and explored family's concerns.

## 2020-11-09 NOTE — PROGRESS NOTES
[facility]  Obstetrics  Postpartum Progress Note    Patient Name: Isabela Read  MRN: [id number]  Admission Date: 2020  Hospital Length of Stay: 3 days  Attending Physician: Negar Solano MD  Primary Care Provider: Primary Doctor No    Subjective:     Principal Problem:Severe pre-eclampsia in second trimester    Hospital Course:  2020: Patient admitted due to severe pre-e SI on cHTN with worsening renal function on admission to the hospital, new FGR (EFW 256g, 3SD less than mean for GA), and severe range blood pressures. Started on magnesium for seizure ppx due to severe pressures. IOL initiated due to concern for worsening maternal renal function in the setting of multiple medical comorbidities. Pt and family counseled extensively. Hyperkalemia corrected, pt continued on telemetry.  2020: IOL continued. Potassium levels and renal function as well as magnesium levels monitored. Mag sulfate continued via 4g bolus prn. Pt had  without complications, delivery of placenta immediately following fetus. Fetus noted to be nonviable at birth. Mirena placed immediately postpartum.  2020: Doing well this morning. Denies pain. Lochia is mild to moderate. Voiding spontaneously. Unsure she wants to talk to telepsychiatry or SW today. Recommended for psychiatric support. Family at bedside and have been supportive. No other complaints. Continue to monitor renal function/mag levels and maintain therapeutic mag level x 24h pp.  2020 - patient doing well this morning without complaints. Has decided to talk to telepsych. Electrolytes continue to trend in right direction. Creatinine up to 3.5 Patient asymptomatic. Voided 900 cc/overnight    Interval History: Patient doing well this morning without complaints. Has decided to talk to telepsych. Electrolytes continue to trend in right direction. Creatinine up to 3.5 Patient asymptomatic. Voided 900 cc/overnight.    Objective:      Vital Signs (Most Recent):  Temp: 97.5 °F (36.4 °C) (20)  Pulse: 88 (20 0400)  Resp: 16 (20)  BP: 128/68 (20)  SpO2: 95 % (20) Vital Signs (24h Range):  Temp:  [96.6 °F (35.9 °C)-98.2 °F (36.8 °C)] 97.5 °F (36.4 °C)  Pulse:  [83-98] 88  Resp:  [16-18] 16  SpO2:  [95 %-100 %] 95 %  BP: (112-141)/(58-77) 128/68     Weight: 75 kg (165 lb 5.5 oz)  Body mass index is 28.38 kg/m².      Intake/Output Summary (Last 24 hours) at 2020 0631  Last data filed at 2020 0500  Gross per 24 hour   Intake 486.25 ml   Output 2500 ml   Net -2013.75 ml           Significant Labs:  Lab Results   Component Value Date    GROUPTRH O POS 2020    HEPBSAG Negative 2020     No results for input(s): HGB, HCT in the last 48 hours.        Physical Exam:   Constitutional: She is oriented to person, place, and time. She appears well-developed and well-nourished.    HENT:   Head: Normocephalic and atraumatic.    Eyes: Pupils are equal, round, and reactive to light. EOM are normal.    Neck: Normal range of motion. Neck supple.    Cardiovascular: Normal rate.     Pulmonary/Chest: Effort normal.        Abdominal: Soft.             Musculoskeletal: Normal range of motion and moves all extremeties.       Neurological: She is alert and oriented to person, place, and time.    Skin: Skin is warm and dry.        Assessment/Plan:     25 y.o. female  for:    * Severe pre-eclampsia in second trimester  - BP: (112-141)/(58-77) 128/68  - AST/ALT wnl;  Plt 444  - Creatinine increased from 2.0 to 3.0 on admission   - 3.2 > 3.5  - s/p Procardia 20, Labetalol 20/40/80/20/40/80   - home procardia increased to 60XL   - Coreg increased to BID dosing. Will hold HCTZ per nephro recs.   - s/p magnesium for seizure ppx    Stage 3b chronic kidney disease  - Most recent Creatinine 3.5.  - Sodium Bicarb 650 BID.  - Nephro following renal function & electrolytes. Appreciated continued recs.      Encounter for induction of labor  - s/p     Anxiety  - continue home celexa  - will need close f/u for mood check following delivery   - Will consult telepsych this AM. Patient in agreement.     Hyperkalemia  - likely secondary to renal disease and worsened renal function  - K: 6.2 on admission   - decreased now to 4.7. Remaining stable at 4.6 x 3 checks.   - Peaked T waves on EKG. Calcium gluconate/insulin/albuterol given on admission. One recurrence in peaked T waves since admission & protocol repeated for hyperkalemia.   - Remains stable currently.     Poor fetal growth affecting management of mother in second trimester  - s/p , IUFD    Restrictive lung disease  - pt has h/o restrictive lung disease; PFTs early this pregnancy were wnl    Renal disease  - CKD IIIB - IV  - Baseline Creatinine 2.0  - Increased Creatinine to 3.0 consistent with worsening renal function in setting of PreE w/ SF   - Cr continuing to trend up. Now 3.5  - Severe proteinuria. On Lovenox. Held in setting of induction.    - Patient now on Heparin 5000u TID  - Nephrology consulted, appreciate recs   - rec d/c IVF, and avoiding LR when hyperkalemic    - Lasix 40 IV daily postpartum    - Bicarb 650 BID  - pt may need RRT, will need to f/u with her nephrologist pp    Type 1 diabetes mellitus with diabetic chronic kidney disease  - Home regiment: Lantus 20 u QHS w/ Carb ratio: , ,  for B/L/D respectively  - Currently on Detemir 7AM/7QHS inpatient w/ sliding scale as needed  - Beta-hydroxybutyrate on admission was 0.1  - Inpatient carb ratio 1:10 w/ all meals            Karina Silverio MD  Obstetrics  [facility]

## 2020-11-09 NOTE — PROGRESS NOTES
Pt visited by spiritual care and woman from birth/death certificate office.  Death certificate will be co-signed by FOB tomorrow when he comes to the hospital.     Consult for telepsych and social work put in by OB team.

## 2020-11-10 PROBLEM — N18.32 STAGE 3B CHRONIC KIDNEY DISEASE: Status: RESOLVED | Noted: 2020-11-08 | Resolved: 2020-11-10

## 2020-11-10 LAB
ALBUMIN SERPL BCP-MCNC: 1.5 G/DL (ref 3.5–5.2)
ALBUMIN SERPL BCP-MCNC: 1.5 G/DL (ref 3.5–5.2)
ALP SERPL-CCNC: 57 U/L (ref 55–135)
ALP SERPL-CCNC: 65 U/L (ref 55–135)
ALT SERPL W/O P-5'-P-CCNC: 15 U/L (ref 10–44)
ALT SERPL W/O P-5'-P-CCNC: 17 U/L (ref 10–44)
ANION GAP SERPL CALC-SCNC: 10 MMOL/L (ref 8–16)
ANION GAP SERPL CALC-SCNC: 11 MMOL/L (ref 8–16)
AST SERPL-CCNC: 16 U/L (ref 10–40)
AST SERPL-CCNC: 21 U/L (ref 10–40)
BASOPHILS # BLD AUTO: 0.03 K/UL (ref 0–0.2)
BASOPHILS NFR BLD: 0.3 % (ref 0–1.9)
BILIRUB SERPL-MCNC: 0.1 MG/DL (ref 0.1–1)
BILIRUB SERPL-MCNC: 0.2 MG/DL (ref 0.1–1)
BUN SERPL-MCNC: 46 MG/DL (ref 6–20)
BUN SERPL-MCNC: 47 MG/DL (ref 6–20)
CALCIUM SERPL-MCNC: 7.6 MG/DL (ref 8.7–10.5)
CALCIUM SERPL-MCNC: 7.6 MG/DL (ref 8.7–10.5)
CHLORIDE SERPL-SCNC: 108 MMOL/L (ref 95–110)
CHLORIDE SERPL-SCNC: 108 MMOL/L (ref 95–110)
CO2 SERPL-SCNC: 16 MMOL/L (ref 23–29)
CO2 SERPL-SCNC: 16 MMOL/L (ref 23–29)
CREAT SERPL-MCNC: 3.3 MG/DL (ref 0.5–1.4)
CREAT SERPL-MCNC: 3.7 MG/DL (ref 0.5–1.4)
DIFFERENTIAL METHOD: ABNORMAL
EOSINOPHIL # BLD AUTO: 0.1 K/UL (ref 0–0.5)
EOSINOPHIL NFR BLD: 1.1 % (ref 0–8)
ERYTHROCYTE [DISTWIDTH] IN BLOOD BY AUTOMATED COUNT: 13.9 % (ref 11.5–14.5)
EST. GFR  (AFRICAN AMERICAN): 19 ML/MIN/1.73 M^2
EST. GFR  (AFRICAN AMERICAN): 21 ML/MIN/1.73 M^2
EST. GFR  (NON AFRICAN AMERICAN): 16 ML/MIN/1.73 M^2
EST. GFR  (NON AFRICAN AMERICAN): 19 ML/MIN/1.73 M^2
GLUCOSE SERPL-MCNC: 133 MG/DL (ref 70–110)
GLUCOSE SERPL-MCNC: 135 MG/DL (ref 70–110)
HCT VFR BLD AUTO: 22.2 % (ref 37–48.5)
HGB BLD-MCNC: 7 G/DL (ref 12–16)
IMM GRANULOCYTES # BLD AUTO: 0.09 K/UL (ref 0–0.04)
IMM GRANULOCYTES NFR BLD AUTO: 0.8 % (ref 0–0.5)
LYMPHOCYTES # BLD AUTO: 2.4 K/UL (ref 1–4.8)
LYMPHOCYTES NFR BLD: 21.4 % (ref 18–48)
MCH RBC QN AUTO: 29.5 PG (ref 27–31)
MCHC RBC AUTO-ENTMCNC: 31.5 G/DL (ref 32–36)
MCV RBC AUTO: 94 FL (ref 82–98)
MONOCYTES # BLD AUTO: 0.9 K/UL (ref 0.3–1)
MONOCYTES NFR BLD: 8 % (ref 4–15)
NEUTROPHILS # BLD AUTO: 7.7 K/UL (ref 1.8–7.7)
NEUTROPHILS NFR BLD: 68.4 % (ref 38–73)
NRBC BLD-RTO: 0 /100 WBC
PLATELET # BLD AUTO: 319 K/UL (ref 150–350)
PMV BLD AUTO: 10.4 FL (ref 9.2–12.9)
POCT GLUCOSE: 117 MG/DL (ref 70–110)
POCT GLUCOSE: 119 MG/DL (ref 70–110)
POCT GLUCOSE: 129 MG/DL (ref 70–110)
POCT GLUCOSE: 131 MG/DL (ref 70–110)
POCT GLUCOSE: 150 MG/DL (ref 70–110)
POCT GLUCOSE: 164 MG/DL (ref 70–110)
POCT GLUCOSE: 229 MG/DL (ref 70–110)
POCT GLUCOSE: 97 MG/DL (ref 70–110)
POTASSIUM SERPL-SCNC: 3.8 MMOL/L (ref 3.5–5.1)
POTASSIUM SERPL-SCNC: 4.3 MMOL/L (ref 3.5–5.1)
PROT SERPL-MCNC: 5 G/DL (ref 6–8.4)
PROT SERPL-MCNC: 5.4 G/DL (ref 6–8.4)
RBC # BLD AUTO: 2.37 M/UL (ref 4–5.4)
SODIUM SERPL-SCNC: 134 MMOL/L (ref 136–145)
SODIUM SERPL-SCNC: 135 MMOL/L (ref 136–145)
WBC # BLD AUTO: 11.23 K/UL (ref 3.9–12.7)

## 2020-11-10 PROCEDURE — 99233 SBSQ HOSP IP/OBS HIGH 50: CPT | Mod: ,,, | Performed by: OBSTETRICS & GYNECOLOGY

## 2020-11-10 PROCEDURE — 80053 COMPREHEN METABOLIC PANEL: CPT | Mod: 91

## 2020-11-10 PROCEDURE — 25000003 PHARM REV CODE 250: Performed by: NURSE PRACTITIONER

## 2020-11-10 PROCEDURE — 25000003 PHARM REV CODE 250: Performed by: STUDENT IN AN ORGANIZED HEALTH CARE EDUCATION/TRAINING PROGRAM

## 2020-11-10 PROCEDURE — 63600175 PHARM REV CODE 636 W HCPCS: Performed by: OBSTETRICS & GYNECOLOGY

## 2020-11-10 PROCEDURE — 85025 COMPLETE CBC W/AUTO DIFF WBC: CPT

## 2020-11-10 PROCEDURE — 11000001 HC ACUTE MED/SURG PRIVATE ROOM

## 2020-11-10 PROCEDURE — 63600175 PHARM REV CODE 636 W HCPCS: Performed by: STUDENT IN AN ORGANIZED HEALTH CARE EDUCATION/TRAINING PROGRAM

## 2020-11-10 PROCEDURE — C9399 UNCLASSIFIED DRUGS OR BIOLOG: HCPCS | Performed by: STUDENT IN AN ORGANIZED HEALTH CARE EDUCATION/TRAINING PROGRAM

## 2020-11-10 PROCEDURE — 36415 COLL VENOUS BLD VENIPUNCTURE: CPT

## 2020-11-10 PROCEDURE — 99233 PR SUBSEQUENT HOSPITAL CARE,LEVL III: ICD-10-PCS | Mod: ,,, | Performed by: OBSTETRICS & GYNECOLOGY

## 2020-11-10 RX ORDER — LABETALOL HYDROCHLORIDE 5 MG/ML
20 INJECTION, SOLUTION INTRAVENOUS ONCE
Status: COMPLETED | OUTPATIENT
Start: 2020-11-10 | End: 2020-11-10

## 2020-11-10 RX ORDER — HEPARIN SODIUM 5000 [USP'U]/ML
5000 INJECTION, SOLUTION INTRAVENOUS; SUBCUTANEOUS EVERY 8 HOURS
Status: DISCONTINUED | OUTPATIENT
Start: 2020-11-10 | End: 2020-11-11 | Stop reason: HOSPADM

## 2020-11-10 RX ORDER — INSULIN ASPART 100 [IU]/ML
8 INJECTION, SOLUTION INTRAVENOUS; SUBCUTANEOUS
Status: COMPLETED | OUTPATIENT
Start: 2020-11-10 | End: 2020-11-10

## 2020-11-10 RX ORDER — NIFEDIPINE 30 MG/1
90 TABLET, EXTENDED RELEASE ORAL DAILY
Status: DISCONTINUED | OUTPATIENT
Start: 2020-11-11 | End: 2020-11-11

## 2020-11-10 RX ORDER — LABETALOL HYDROCHLORIDE 5 MG/ML
80 INJECTION, SOLUTION INTRAVENOUS ONCE
Status: DISCONTINUED | OUTPATIENT
Start: 2020-11-10 | End: 2020-11-11 | Stop reason: HOSPADM

## 2020-11-10 RX ORDER — NIFEDIPINE 30 MG/1
30 TABLET, EXTENDED RELEASE ORAL ONCE
Status: COMPLETED | OUTPATIENT
Start: 2020-11-10 | End: 2020-11-10

## 2020-11-10 RX ORDER — LABETALOL HYDROCHLORIDE 5 MG/ML
40 INJECTION, SOLUTION INTRAVENOUS ONCE
Status: COMPLETED | OUTPATIENT
Start: 2020-11-10 | End: 2020-11-10

## 2020-11-10 RX ADMIN — CARVEDILOL 25 MG: 12.5 TABLET, FILM COATED ORAL at 08:11

## 2020-11-10 RX ADMIN — INSULIN DETEMIR 8 UNITS: 100 INJECTION, SOLUTION SUBCUTANEOUS at 08:11

## 2020-11-10 RX ADMIN — HEPARIN SODIUM 5000 UNITS: 5000 INJECTION INTRAVENOUS; SUBCUTANEOUS at 01:11

## 2020-11-10 RX ADMIN — INSULIN ASPART 2 UNITS: 100 INJECTION, SOLUTION INTRAVENOUS; SUBCUTANEOUS at 11:11

## 2020-11-10 RX ADMIN — HEPARIN SODIUM 5000 UNITS: 5000 INJECTION INTRAVENOUS; SUBCUTANEOUS at 09:11

## 2020-11-10 RX ADMIN — LABETALOL HYDROCHLORIDE 40 MG: 5 INJECTION INTRAVENOUS at 12:11

## 2020-11-10 RX ADMIN — LABETALOL HYDROCHLORIDE 20 MG: 5 INJECTION INTRAVENOUS at 10:11

## 2020-11-10 RX ADMIN — FUROSEMIDE 40 MG: 40 TABLET ORAL at 08:11

## 2020-11-10 RX ADMIN — INSULIN ASPART 8 UNITS: 100 INJECTION, SOLUTION INTRAVENOUS; SUBCUTANEOUS at 06:11

## 2020-11-10 RX ADMIN — ESCITALOPRAM OXALATE 20 MG: 10 TABLET ORAL at 08:11

## 2020-11-10 RX ADMIN — INSULIN DETEMIR 8 UNITS: 100 INJECTION, SOLUTION SUBCUTANEOUS at 09:11

## 2020-11-10 RX ADMIN — INSULIN ASPART 9 UNITS: 100 INJECTION, SOLUTION INTRAVENOUS; SUBCUTANEOUS at 09:11

## 2020-11-10 RX ADMIN — NIFEDIPINE 60 MG: 30 TABLET, FILM COATED, EXTENDED RELEASE ORAL at 08:11

## 2020-11-10 RX ADMIN — NIFEDIPINE 30 MG: 30 TABLET, FILM COATED, EXTENDED RELEASE ORAL at 10:11

## 2020-11-10 RX ADMIN — INSULIN ASPART 6 UNITS: 100 INJECTION, SOLUTION INTRAVENOUS; SUBCUTANEOUS at 01:11

## 2020-11-10 RX ADMIN — HEPARIN SODIUM 5000 UNITS: 5000 INJECTION INTRAVENOUS; SUBCUTANEOUS at 05:11

## 2020-11-10 RX ADMIN — SODIUM BICARBONATE 650 MG TABLET 650 MG: at 08:11

## 2020-11-10 RX ADMIN — LABETALOL HYDROCHLORIDE 20 MG: 5 INJECTION INTRAVENOUS at 12:11

## 2020-11-10 NOTE — PROGRESS NOTES
Results for SIXTO CARNEY (MRN [id number]) as of 11/9/2020 19:37   Ref. Range 11/9/2020 19:24   POCT Glucose Latest Ref Range: 70 - 110 mg/dL 179 (H)   Dr. Johansen notified of pt's pre-preprandial blood glucose. Pt's dinner tray contains 65 g carbs. See MAR for order and administration details.    Pt denies experiencing symptoms of hypo/hyperglycemia.  Instructed to call RN with any concerns or symptoms.

## 2020-11-10 NOTE — PROGRESS NOTES
Progress Note  Nephrology    Admit Date: 11/6/2020   LOS: 4 days     SUBJECTIVE:     Follow-up For: GEORGE on CKD Stage 4    Interval History:   Uneventful night.  Discussed with OB team this am.  Pt feels well and eager to go home.  Creat improved and UOP excellent.      OBJECTIVE:     Vital Signs (Most Recent)  Temp: 97.7 °F (36.5 °C) (11/10/20 0808)  Pulse: 88 (11/10/20 0808)  Resp: 16 (11/10/20 0808)  BP: (!) 179/90(md aware) (11/10/20 0808)  SpO2: 100 % (11/10/20 0451)    Vital Signs Range (Last 24H):  Temp:  [97.7 °F (36.5 °C)-99 °F (37.2 °C)]   Pulse:  [84-95]   Resp:  [14-18]   BP: (126-179)/(71-90)   SpO2:  [95 %-100 %]       Intake/Output Summary (Last 24 hours) at 11/10/2020 0941  Last data filed at 11/10/2020 0600  Gross per 24 hour   Intake 250 ml   Output 2800 ml   Net -2550 ml     Review of Systems:   Constitutional: positive for anorexia and fatigue  Eyes: no visual changes  ENT: no nasal congestion or sore throat  Respiratory: no cough or shortness of breath  Cardiovascular: no chest pain or palpitations  Gastrointestinal: no nausea or vomiting, no abdominal pain or change in bowel habits  Musculoskeletal: no arthralgias or myalgias  Neurological: no seizures or tremors  Behavioral/Psych: no auditory or visual hallucinations  Endocrine: no heat or cold intolerance     Physical Exam:  Gen: AAOx3, NAD  HEENT: mmm, sclera anicteric  CV: RRR, no m/r  Resp: CTAB, no rales or wheezes  GI: soft, non-distended  Extr: 2+ BLE and 1+ RUE edema    Laboratory:  Recent Labs   Lab 11/03/20  1244  11/09/20  0853 11/09/20  1734 11/10/20  0757      < > 132* 132* 135*   K 4.7   < > 4.9 4.7 4.3      < > 106 108 108   CO2 19*   < > 18* 16* 16*   BUN 39*   < > 43* 45* 47*   CREATININE 2.6*   < > 3.6* 3.6* 3.3*   CALCIUM 8.8   < > 7.7* 7.6* 7.6*   PHOS 4.5  --   --   --   --     < > = values in this interval not displayed.     Recent Labs   Lab 11/03/20  1244 11/06/20  1215   WBC 8.90 8.82   HGB 9.3* 8.8*   HCT  29.1* 26.8*   * 404*        Diagnostic Results:  Labs: Reviewed  US: Reviewed    ASSESSMENT/PLAN:     Resolving Hyponatremia  - Hypervolemic per clinic exam.  - Stopped HCTZ and NS IVFs.  - Started Lasix 40mg IV daily and changed to oral QOD.      CKD Stage 4/A3  - Progressive decline in renal function and with nephrotic range proteinuria, likely due to long history of uncontrolled HTN and DM.  - Kidney bx has not been done in the past and would highly recommend soon.  -previous proteinuria w/up neg.   - Now post-partum, switched heparin PPx to 5000 units SQ tid.  - She is at high risk of requiring RRT in the next 6 months if current renal trends continue.  -follow trends.   -consider ACE/ARB when Creat stabilizes.   -creat improved and ok to DC.    -Renally dose meds, avoid nephrotoxins, and monitor I/O's closely.       Hyperkalemia-resolved  - Medically treated in the ED.  - With control of hyperglycemia, potassium should also improve.  - Changed to low K/diabetic diet.  - Would avoid LR when hyperkalemic as it contains potassium.     Renal Hypertension  - Home regimen includes Coreg 12.5mg bid, HCTZ 25mg/d, Nifedipine 30mg/d.  - Increased Coreg to 25mg bid and increased Nifedipine to 60mg/d.  - Started diuresis with Lasix 40mg IV daily.   -hold parameters placed as BP much improved.      T1DM with hyperglycemia  - HgbA1C 10.9%.  - Followed by Dr. Mayo at [facility].  - Insulin titration per primary team.     Anemia of Pregnancy with Iron deficiency  - Has received IV iron as outpatient.     Vitamin D deficiency  - On Ergocalciferol.     Chronic metabolic acidosis due to distal RTA from advanced CKD  - Has not been on bicarb tabs at home in recent weeks.  Resumed home dose of 650mg bid.    Thank you for allowing me to participate in the care of this patient.    Ok to DC home.  Meds as current on MAR.  F/u with [facility] 1-2 weeks.       Dinh Stewart, ADYP  Nephrology

## 2020-11-10 NOTE — ASSESSMENT & PLAN NOTE
- likely secondary to renal disease and worsened renal function  - K: 6.2 on admission  - Peaked T waves on EKG. Calcium gluconate/insulin/albuterol given on admission. One recurrence in peaked T waves since admission & protocol repeated for hyperkalemia.   - Remains stable currently around 4.6

## 2020-11-10 NOTE — PROGRESS NOTES
[facility]  Obstetrics  Postpartum Progress Note    Patient Name: Isabela Read  MRN: [id number]  Admission Date: 2020  Hospital Length of Stay: 4 days  Attending Physician: Negar Solano MD  Primary Care Provider: Primary Doctor No    Subjective:     Principal Problem:Severe pre-eclampsia in second trimester    Hospital Course:  2020: Patient admitted due to severe pre-e SI on cHTN with worsening renal function on admission to the hospital, new FGR (EFW 256g, 3SD less than mean for GA), and severe range blood pressures. Started on magnesium for seizure ppx due to severe pressures. IOL initiated due to concern for worsening maternal renal function in the setting of multiple medical comorbidities. Pt and family counseled extensively. Hyperkalemia corrected, pt continued on telemetry.  2020: IOL continued. Potassium levels and renal function as well as magnesium levels monitored. Mag sulfate continued via 4g bolus prn. Pt had  without complications, delivery of placenta immediately following fetus. Fetus noted to be nonviable at birth. Mirena placed immediately postpartum.  2020: Doing well this morning. Denies pain. Lochia is mild to moderate. Voiding spontaneously. Unsure she wants to talk to telepsychiatry or SW today. Recommended for psychiatric support. Family at bedside and have been supportive. No other complaints. Continue to monitor renal function/mag levels and maintain therapeutic mag level x 24h pp.  2020 - patient doing well this morning without complaints. Has decided to talk to telepsych. Electrolytes continue to trend in right direction. Creatinine up to 3.5 Patient asymptomatic. Voided 900 cc/overnight  11/10/2020 - Patient had telepsych visit yesterday and decision made to change out celexa for to lexapro. Trazadone added nightly PRN for insomnia. Labs continuing to improve. Detemir increased to . Asymptomatic. States the Trazadone  really helped her sleep last night.     Interval History: Patient had telepsych visit yesterday and decision made to change out celexa for to lexapro. Trazadone added nightly PRN for insomnia. Labs continuing to improve. Detemir imcreased to . Asymptomatic. States the Trazadone really helped her sleep last night.     Objective:     Vital Signs (Most Recent):  Temp: 98.5 °F (36.9 °C) (11/10/20 0451)  Pulse: 87 (11/10/20 0451)  Resp: 16 (11/10/20 0451)  BP: (!) 145/79 (11/10/20 0451)  SpO2: 100 % (11/10/20 0451) Vital Signs (24h Range):  Temp:  [97.9 °F (36.6 °C)-99 °F (37.2 °C)] 98.5 °F (36.9 °C)  Pulse:  [84-95] 87  Resp:  [14-18] 16  SpO2:  [95 %-100 %] 100 %  BP: (126-148)/(65-80) 145/79     Weight: 74.2 kg (163 lb 9.3 oz)  Body mass index is 28.08 kg/m².      Intake/Output Summary (Last 24 hours) at 11/10/2020 0630  Last data filed at 11/10/2020 0600  Gross per 24 hour   Intake 250 ml   Output 2800 ml   Net -2550 ml           Significant Labs:  Lab Results   Component Value Date    GROUPTRH O POS 2020    HEPBSAG Negative 2020     No results for input(s): HGB, HCT in the last 48 hours.      Physical Exam:   Constitutional: She is oriented to person, place, and time. She appears well-developed and well-nourished.    HENT:   Head: Normocephalic and atraumatic.    Eyes: Pupils are equal, round, and reactive to light. EOM are normal.    Neck: Normal range of motion. Neck supple.    Cardiovascular: Normal rate.     Pulmonary/Chest: Effort normal.        Abdominal: Soft.             Musculoskeletal: Normal range of motion and moves all extremeties.       Neurological: She is alert and oriented to person, place, and time.    Skin: Skin is warm and dry.        Assessment/Plan:     25 y.o. female  for:    * Severe pre-eclampsia in second trimester  - BP: (126-148)/(65-80) 145/79  - AST/ALT wnl;  Plt 444  - Creatinine increased from 2.0 to 3.0 on admission   - 3.2 > 3.6  - s/p Procardia 20, Labetalol  20/40/80/20/40/80   - home procardia increased to 60XL   - Coreg increased to BID dosing.Will hold HCTZ per nephro recs.   - s/p magnesium for seizure ppx    Encounter for induction of labor  - s/p     Anxiety  - Telepsych saw patient . Recommended switching from Celexa to Lexapro.   - Also added Trazadone nightly PRN for insomnia per psych recs    Hyperkalemia  - likely secondary to renal disease and worsened renal function  - K: 6.2 on admission  - Peaked T waves on EKG. Calcium gluconate/insulin/albuterol given on admission. One recurrence in peaked T waves since admission & protocol repeated for hyperkalemia.   - Remains stable currently around 4.6     Poor fetal growth affecting management of mother in second trimester  - s/p , IUFD    Restrictive lung disease  - pt has h/o restrictive lung disease; PFTs early this pregnancy were wnl    Renal disease  - CKD IIIB - IV  - Baseline Creatinine 2.0  - Increased Creatinine to 3.0 consistent with worsening renal function in setting of PreE w/ SF   - Cr stabilizing around 3.6  - Severe proteinuria. On Lovenox. Held in setting of induction.    - Patient now on Heparin 5000u TID  - Nephrology consulted, appreciate recs   - rec d/c IVF, and avoiding LR when hyperkalemic    - Lasix 40 IV daily postpartum    - Bicarb 650 BID  - pt may need RRT, will need to f/u with her nephrologist pp    Type 1 diabetes mellitus with diabetic chronic kidney disease  - Home regimen: Lantus 20 u QHS w/ Carb ratio: , ,  for B/L/D respectively  - Detemir increased last night to 8AM/8QHS inpatient. Sliding scale as needed.   - Beta-hydroxybutyrate on admission was 0.1  - Inpatient carb ratio 1:10 w/ all meals; Insulin sensitivity factor of 35.           Karina Silverio MD  Obstetrics  [facility]

## 2020-11-10 NOTE — PROGRESS NOTES
PPD#3 s/p  (IUFD 23w)      H/H: /400    *TELE*  Dx: PreE w/ SF (BP), T1DM, St3b-4 CKD, nephrotic syndrome, FGR, hyperkalemia w/ peaked Ts, anemia, anxiety, RLD (normal PFTs this preg)          BC: ppIUD  Meds: s/p Mag, tylenol, oxy IR, heparin 5000 TID, lasix 40 IV daily, Coreg 25 BID, Procardia 60XL, Sodium Bicarb 650 BID, Lexapro, Trazadone PRN, albuterol, detemir 8 AM/8 QHS        Home regimen: HCTZ, detemir 20qHS, carb counts (1:9 breakfast/dinner, 1:8 lunch), ASA  Labs: K 6.2 >4.8  Cr 3>>3.7  b-hydroxy: 0.1  Ma.9 > 4.3  BP: (109-141)/(56-77) 141/77       UOP: 900cc/overnight  FEMALE (IUFD) [ ] telepsych pp  [ ] 1:10carb ratio w/ meals  [ ] pre/post glucose (ISF: 35)                    Fasting                Breakfast                   Lunch                                       Dinner                                                                                                                    182 > 4u > 170(2u) > 141                                  218 > 4u > 296 (6u)      216 > 10u >70 > snack      179>6u>177(2u)  11/10

## 2020-11-10 NOTE — ASSESSMENT & PLAN NOTE
- Telepsych saw patient 11/9. Recommended switching from Celexa to Lexapro.   - Also added Trazadone nightly PRN for insomnia per psych recs

## 2020-11-10 NOTE — SUBJECTIVE & OBJECTIVE
Interval History: Patient had telepsych visit yesterday and decision made to change out celexa for to lexapro. Trazadone added nightly PRN for insomnia. Labs continuing to improve. Detemir imcreased to 8/8. Asymptomatic. States the Trazadone really helped her sleep last night.     Objective:     Vital Signs (Most Recent):  Temp: 98.5 °F (36.9 °C) (11/10/20 0451)  Pulse: 87 (11/10/20 0451)  Resp: 16 (11/10/20 0451)  BP: (!) 145/79 (11/10/20 0451)  SpO2: 100 % (11/10/20 0451) Vital Signs (24h Range):  Temp:  [97.9 °F (36.6 °C)-99 °F (37.2 °C)] 98.5 °F (36.9 °C)  Pulse:  [84-95] 87  Resp:  [14-18] 16  SpO2:  [95 %-100 %] 100 %  BP: (126-148)/(65-80) 145/79     Weight: 74.2 kg (163 lb 9.3 oz)  Body mass index is 28.08 kg/m².      Intake/Output Summary (Last 24 hours) at 11/10/2020 0630  Last data filed at 11/10/2020 0600  Gross per 24 hour   Intake 250 ml   Output 2800 ml   Net -2550 ml           Significant Labs:  Lab Results   Component Value Date    GROUPTRH O POS 11/07/2020    HEPBSAG Negative 07/29/2020     No results for input(s): HGB, HCT in the last 48 hours.      Physical Exam:   Constitutional: She is oriented to person, place, and time. She appears well-developed and well-nourished.    HENT:   Head: Normocephalic and atraumatic.    Eyes: Pupils are equal, round, and reactive to light. EOM are normal.    Neck: Normal range of motion. Neck supple.    Cardiovascular: Normal rate.     Pulmonary/Chest: Effort normal.        Abdominal: Soft.             Musculoskeletal: Normal range of motion and moves all extremeties.       Neurological: She is alert and oriented to person, place, and time.    Skin: Skin is warm and dry.

## 2020-11-10 NOTE — PROGRESS NOTES
PPD#2 s/p  (IUFD 23w)      H/H: /400    *TELE*  Dx: PreE w/ SF (BP), T1DM, St3b-4 CKD, nephrotic syndrome, FGR, hyperkalemia w/ peaked Ts, anemia, anxiety, RLD (normal PFTs this preg)          BC: ppIUD  Meds: s/p Mag, tylenol, oxy IR, heparin 5000 TID, lasix 40 IV daily, Coreg 25 BID, Procardia 60XL, Sodium Bicarb 650 BID, Lexapro, Trazadone PRN, albuterol, detemir 8 AM/8 QHS        Home regimen: HCTZ, detemir 20qHS, carb counts (1:9 breakfast/dinner, 1:8 lunch), ASA  Labs: K 6.2 >4.8  Cr 3>>3.7  b-hydroxy: 0.1  Ma.9 > 4.3  BP: (109-141)/(56-77) 141/77       UOP: 900cc/overnight  FEMALE (IUFD) [ ] telepsych pp  [ ] 1:10carb ratio w/ meals  [ ] pre/post glucose (ISF: 35)                    Fasting                Breakfast                   Lunch                        Dinner                                                                                             182 > 4u > 170(2u) > 141                  218 > 4u > 296 (6u)     216 > 10u >70 > snack    179>6u>177 (2u)  11/10

## 2020-11-10 NOTE — ASSESSMENT & PLAN NOTE
- CKD IIIB - IV  - Baseline Creatinine 2.0  - Increased Creatinine to 3.0 consistent with worsening renal function in setting of PreE w/ SF   - Cr stabilizing around 3.6  - Severe proteinuria. On Lovenox. Held in setting of induction.    - Patient now on Heparin 5000u TID  - Nephrology consulted, appreciate recs   - rec d/c IVF, and avoiding LR when hyperkalemic    - Lasix 40 IV daily postpartum    - Bicarb 650 BID  - pt may need RRT, will need to f/u with her nephrologist pp

## 2020-11-10 NOTE — PLAN OF CARE
Met with pt today after consult ordered for resources and support.     Introduced self and explained sw role. Pt was pleasant but not very interested in engaging. Pt is receptive to grief resources. SW will list resources in AVS. Educated on post partum depression. Pt verbalized understanding. Pt aware of financial resources available for burial/cremation. Pt also aware sw remains available for remainder of hospital stay for additional emotional support.     Eula Duarte LCSW    [facility]  [email or web address]    (phone) [phone] or  [place]. [phone]  (fax) [phone]

## 2020-11-10 NOTE — ASSESSMENT & PLAN NOTE
- Home regimen: Lantus 20 u QHS w/ Carb ratio: 1/9, 1/8, 1/9 for B/L/D respectively  - Detemir increased last night to 8AM/8QHS inpatient. Sliding scale as needed.   - Beta-hydroxybutyrate on admission was 0.1  - Inpatient carb ratio 1:10 w/ all meals; Insulin sensitivity factor of 35.

## 2020-11-10 NOTE — PROGRESS NOTES
Results for SIXTO CARNEY (MRN [id number]) as of 11/9/2020 21:49   Ref. Range 11/9/2020 21:33   POCT Glucose Latest Ref Range: 70 - 110 mg/dL 177 (H)   2-hr postprandial. Dr. Johansen notified. See MAR for order and administration details. Pt denies headache, fatigue, or blurry vision as signs of hyperglycemia.     Results for SIXTO CARNEY (MRN [id number]) as of 11/10/2020 02:38   Ref. Range 11/10/2020 02:35   POCT Glucose Latest Ref Range: 70 - 110 mg/dL 129 (H)   MD notified. No new orders. Spot check during pt's sleep. Pt asymptomatic

## 2020-11-10 NOTE — PROGRESS NOTES
Intake/Output sheet not completed by pt. Pt unsure of how much she had to drink over the course of the day. Instructed to document intake on paper provided and educated on rationale. Pt states she understands.

## 2020-11-11 VITALS
HEART RATE: 90 BPM | DIASTOLIC BLOOD PRESSURE: 76 MMHG | HEIGHT: 64 IN | SYSTOLIC BLOOD PRESSURE: 130 MMHG | RESPIRATION RATE: 18 BRPM | WEIGHT: 163.56 LBS | OXYGEN SATURATION: 95 % | TEMPERATURE: 99 F | BODY MASS INDEX: 27.92 KG/M2

## 2020-11-11 LAB
ALBUMIN SERPL BCP-MCNC: 1.6 G/DL (ref 3.5–5.2)
ALBUMIN SERPL BCP-MCNC: 1.7 G/DL (ref 3.5–5.2)
ALP SERPL-CCNC: 62 U/L (ref 55–135)
ALP SERPL-CCNC: 68 U/L (ref 55–135)
ALT SERPL W/O P-5'-P-CCNC: 18 U/L (ref 10–44)
ALT SERPL W/O P-5'-P-CCNC: 19 U/L (ref 10–44)
ANION GAP SERPL CALC-SCNC: 10 MMOL/L (ref 8–16)
ANION GAP SERPL CALC-SCNC: 11 MMOL/L (ref 8–16)
AST SERPL-CCNC: 20 U/L (ref 10–40)
AST SERPL-CCNC: 21 U/L (ref 10–40)
BILIRUB SERPL-MCNC: 0.1 MG/DL (ref 0.1–1)
BILIRUB SERPL-MCNC: 0.1 MG/DL (ref 0.1–1)
BUN SERPL-MCNC: 52 MG/DL (ref 6–20)
BUN SERPL-MCNC: 55 MG/DL (ref 6–20)
CALCIUM SERPL-MCNC: 7.6 MG/DL (ref 8.7–10.5)
CALCIUM SERPL-MCNC: 7.7 MG/DL (ref 8.7–10.5)
CHLORIDE SERPL-SCNC: 108 MMOL/L (ref 95–110)
CHLORIDE SERPL-SCNC: 110 MMOL/L (ref 95–110)
CO2 SERPL-SCNC: 15 MMOL/L (ref 23–29)
CO2 SERPL-SCNC: 18 MMOL/L (ref 23–29)
CREAT SERPL-MCNC: 4.1 MG/DL (ref 0.5–1.4)
CREAT SERPL-MCNC: 4.2 MG/DL (ref 0.5–1.4)
EST. GFR  (AFRICAN AMERICAN): 16 ML/MIN/1.73 M^2
EST. GFR  (AFRICAN AMERICAN): 16 ML/MIN/1.73 M^2
EST. GFR  (NON AFRICAN AMERICAN): 14 ML/MIN/1.73 M^2
EST. GFR  (NON AFRICAN AMERICAN): 14 ML/MIN/1.73 M^2
GLUCOSE SERPL-MCNC: 119 MG/DL (ref 70–110)
GLUCOSE SERPL-MCNC: 187 MG/DL (ref 70–110)
POCT GLUCOSE: 128 MG/DL (ref 70–110)
POCT GLUCOSE: 136 MG/DL (ref 70–110)
POCT GLUCOSE: 143 MG/DL (ref 70–110)
POCT GLUCOSE: 153 MG/DL (ref 70–110)
POCT GLUCOSE: 160 MG/DL (ref 70–110)
POCT GLUCOSE: 161 MG/DL (ref 70–110)
POCT GLUCOSE: 210 MG/DL (ref 70–110)
POCT GLUCOSE: 217 MG/DL (ref 70–110)
POCT GLUCOSE: 243 MG/DL (ref 70–110)
POTASSIUM SERPL-SCNC: 4.4 MMOL/L (ref 3.5–5.1)
POTASSIUM SERPL-SCNC: 4.5 MMOL/L (ref 3.5–5.1)
PROT SERPL-MCNC: 5.5 G/DL (ref 6–8.4)
PROT SERPL-MCNC: 5.8 G/DL (ref 6–8.4)
SODIUM SERPL-SCNC: 136 MMOL/L (ref 136–145)
SODIUM SERPL-SCNC: 136 MMOL/L (ref 136–145)

## 2020-11-11 PROCEDURE — 63600175 PHARM REV CODE 636 W HCPCS: Performed by: OBSTETRICS & GYNECOLOGY

## 2020-11-11 PROCEDURE — 80053 COMPREHEN METABOLIC PANEL: CPT

## 2020-11-11 PROCEDURE — 80053 COMPREHEN METABOLIC PANEL: CPT | Mod: 91

## 2020-11-11 PROCEDURE — 25000003 PHARM REV CODE 250: Performed by: STUDENT IN AN ORGANIZED HEALTH CARE EDUCATION/TRAINING PROGRAM

## 2020-11-11 PROCEDURE — 63600175 PHARM REV CODE 636 W HCPCS: Performed by: STUDENT IN AN ORGANIZED HEALTH CARE EDUCATION/TRAINING PROGRAM

## 2020-11-11 PROCEDURE — 36415 COLL VENOUS BLD VENIPUNCTURE: CPT

## 2020-11-11 PROCEDURE — 99232 PR SUBSEQUENT HOSPITAL CARE,LEVL II: ICD-10-PCS | Mod: ,,, | Performed by: OBSTETRICS & GYNECOLOGY

## 2020-11-11 PROCEDURE — 99232 SBSQ HOSP IP/OBS MODERATE 35: CPT | Mod: ,,, | Performed by: OBSTETRICS & GYNECOLOGY

## 2020-11-11 PROCEDURE — 25000003 PHARM REV CODE 250: Performed by: NURSE PRACTITIONER

## 2020-11-11 RX ORDER — HEPARIN SODIUM 5000 [USP'U]/ML
5000 INJECTION, SOLUTION INTRAVENOUS; SUBCUTANEOUS EVERY 8 HOURS
Qty: 90 ML | Refills: 0 | Status: SHIPPED | OUTPATIENT
Start: 2020-11-11 | End: 2020-11-11

## 2020-11-11 RX ORDER — INSULIN ASPART 100 [IU]/ML
8 INJECTION, SOLUTION INTRAVENOUS; SUBCUTANEOUS ONCE
Status: COMPLETED | OUTPATIENT
Start: 2020-11-11 | End: 2020-11-11

## 2020-11-11 RX ORDER — TRAZODONE HYDROCHLORIDE 50 MG/1
50 TABLET ORAL NIGHTLY PRN
Qty: 30 TABLET | Refills: 1 | Status: ON HOLD | OUTPATIENT
Start: 2020-11-11 | End: 2022-11-08 | Stop reason: HOSPADM

## 2020-11-11 RX ORDER — HEPARIN SODIUM 5000 [USP'U]/ML
5000 INJECTION, SOLUTION INTRAVENOUS; SUBCUTANEOUS EVERY 8 HOURS
Qty: 90 ML | Refills: 0 | Status: SHIPPED | OUTPATIENT
Start: 2020-11-11 | End: 2020-11-30 | Stop reason: ALTCHOICE

## 2020-11-11 RX ORDER — ESCITALOPRAM OXALATE 20 MG/1
20 TABLET ORAL DAILY
Qty: 30 TABLET | Refills: 11 | Status: SHIPPED | OUTPATIENT
Start: 2020-11-12 | End: 2022-03-29

## 2020-11-11 RX ORDER — NIFEDIPINE 60 MG/1
60 TABLET, EXTENDED RELEASE ORAL DAILY
Qty: 30 TABLET | Refills: 11 | Status: ON HOLD | OUTPATIENT
Start: 2020-11-12 | End: 2021-03-05 | Stop reason: SDUPTHER

## 2020-11-11 RX ORDER — CARVEDILOL 25 MG/1
25 TABLET ORAL 2 TIMES DAILY
Qty: 60 TABLET | Refills: 11 | Status: SHIPPED | OUTPATIENT
Start: 2020-11-11 | End: 2021-11-11

## 2020-11-11 RX ORDER — SODIUM BICARBONATE 650 MG/1
650 TABLET ORAL DAILY
Qty: 30 TABLET | Refills: 11 | COMMUNITY
Start: 2020-11-12 | End: 2020-11-30

## 2020-11-11 RX ORDER — INSULIN ASPART 100 [IU]/ML
4 INJECTION, SOLUTION INTRAVENOUS; SUBCUTANEOUS ONCE
Status: COMPLETED | OUTPATIENT
Start: 2020-11-11 | End: 2020-11-11

## 2020-11-11 RX ORDER — FUROSEMIDE 40 MG/1
40 TABLET ORAL EVERY OTHER DAY
Qty: 15 TABLET | Refills: 11 | Status: SHIPPED | OUTPATIENT
Start: 2020-11-12 | End: 2020-12-07 | Stop reason: SDUPTHER

## 2020-11-11 RX ORDER — NIFEDIPINE 30 MG/1
60 TABLET, EXTENDED RELEASE ORAL DAILY
Status: DISCONTINUED | OUTPATIENT
Start: 2020-11-12 | End: 2020-11-11 | Stop reason: HOSPADM

## 2020-11-11 RX ADMIN — ESCITALOPRAM OXALATE 20 MG: 10 TABLET ORAL at 09:11

## 2020-11-11 RX ADMIN — CARVEDILOL 25 MG: 12.5 TABLET, FILM COATED ORAL at 09:11

## 2020-11-11 RX ADMIN — INSULIN ASPART 2 UNITS: 100 INJECTION, SOLUTION INTRAVENOUS; SUBCUTANEOUS at 01:11

## 2020-11-11 RX ADMIN — HEPARIN SODIUM 5000 UNITS: 5000 INJECTION INTRAVENOUS; SUBCUTANEOUS at 01:11

## 2020-11-11 RX ADMIN — PRENATAL VIT W/ FE FUMARATE-FA TAB 27-0.8 MG 1 TABLET: 27-0.8 TAB at 09:11

## 2020-11-11 RX ADMIN — INSULIN ASPART 2 UNITS: 100 INJECTION, SOLUTION INTRAVENOUS; SUBCUTANEOUS at 12:11

## 2020-11-11 RX ADMIN — INSULIN ASPART 2 UNITS: 100 INJECTION, SOLUTION INTRAVENOUS; SUBCUTANEOUS at 11:11

## 2020-11-11 RX ADMIN — HEPARIN SODIUM 5000 UNITS: 5000 INJECTION INTRAVENOUS; SUBCUTANEOUS at 08:11

## 2020-11-11 RX ADMIN — SODIUM BICARBONATE 650 MG TABLET 650 MG: at 09:11

## 2020-11-11 RX ADMIN — INSULIN ASPART 4 UNITS: 100 INJECTION, SOLUTION INTRAVENOUS; SUBCUTANEOUS at 06:11

## 2020-11-11 RX ADMIN — INSULIN ASPART 8 UNITS: 100 INJECTION, SOLUTION INTRAVENOUS; SUBCUTANEOUS at 01:11

## 2020-11-11 RX ADMIN — INSULIN DETEMIR 8 UNITS: 100 INJECTION, SOLUTION SUBCUTANEOUS at 08:11

## 2020-11-11 RX ADMIN — INSULIN ASPART 8 UNITS: 100 INJECTION, SOLUTION INTRAVENOUS; SUBCUTANEOUS at 08:11

## 2020-11-11 RX ADMIN — NIFEDIPINE 90 MG: 30 TABLET, FILM COATED, EXTENDED RELEASE ORAL at 09:11
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
## 2020-11-11 NOTE — PROGRESS NOTES
Progress Note  Nephrology    Admit Date: 11/6/2020   LOS: 5 days     SUBJECTIVE:     Follow-up For: GEORGE on CKD Stage 4    Interval History:    Events noted of yesterday.  BP elevated likely from nerves/stress/etc.  Meds adjusted and given IV push labetolol.  Now hypotensive and holding BP meds.  Pt feels well and asking to go home.  No CP/SOB.  Discussed with RN.     OBJECTIVE:     Vital Signs (Most Recent)  Temp: 98.5 °F (36.9 °C) (11/11/20 0557)  Pulse: 91 (11/11/20 0600)  Resp: 18 (11/11/20 0557)  BP: 119/73 (11/11/20 0557)  SpO2: 100 % (11/11/20 0557)    Vital Signs Range (Last 24H):  Temp:  [97.9 °F (36.6 °C)-99.4 °F (37.4 °C)]   Pulse:  [85-96]   Resp:  [18]   BP: ()/(54-93)   SpO2:  [100 %]       Intake/Output Summary (Last 24 hours) at 11/11/2020 0826  Last data filed at 11/10/2020 2300  Gross per 24 hour   Intake 755 ml   Output 800 ml   Net -45 ml     Review of Systems:   Constitutional: positive for anorexia and fatigue  Eyes: no visual changes  ENT: no nasal congestion or sore throat  Respiratory: no cough or shortness of breath  Cardiovascular: no chest pain or palpitations  Gastrointestinal: no nausea or vomiting, no abdominal pain or change in bowel habits  Musculoskeletal: no arthralgias or myalgias  Neurological: no seizures or tremors  Behavioral/Psych: no auditory or visual hallucinations  Endocrine: no heat or cold intolerance     Physical Exam:  Gen: AAOx3, NAD  HEENT: mmm, sclera anicteric  CV: RRR, no m/r  Resp: CTAB, no rales or wheezes  GI: soft, non-distended  Extr: 2+ BLE and 1+ RUE edema    Laboratory:  Recent Labs   Lab 11/09/20  1734 11/10/20  0757 11/10/20  1939   * 135* 134*   K 4.7 4.3 3.8    108 108   CO2 16* 16* 16*   BUN 45* 47* 46*   CREATININE 3.6* 3.3* 3.7*   CALCIUM 7.6* 7.6* 7.6*     Recent Labs   Lab 11/06/20  1215 11/10/20  1750   WBC 8.82 11.23   HGB 8.8* 7.0*   HCT 26.8* 22.2*   * 319        Diagnostic Results:  Labs: Reviewed  US:  Reviewed    ASSESSMENT/PLAN:     Resolving Hyponatremia  - Hypervolemic per clinic exam.  - Stopped HCTZ and NS IVFs.  - Started Lasix 40mg IV daily and changed to oral QOD.      CKD Stage 4/A3  - Progressive decline in renal function and with nephrotic range proteinuria, likely due to long history of uncontrolled HTN and DM.  - Kidney bx has not been done in the past and would highly recommend soon as [place] [facility]  -previous proteinuria w/up neg.   - Now post-partum, switched heparin PPx to 5000 units SQ tid.  - She is at high risk of requiring RRT in the next 6 months if current renal trends continue.  -follow trends.   -consider ACE/ARB when Creat stabilizes.   -creat variable with shifts in BP and med adjustments.   -Renally dose meds, avoid nephrotoxins, and monitor I/O's closely.       Hyperkalemia-resolved  - Medically treated in the ED.  - With control of hyperglycemia, potassium should also improve.  - Changed to low K/diabetic diet.  - Would avoid LR when hyperkalemic as it contains potassium.     Renal Hypertension  -Home regimen includes Coreg 12.5mg bid, HCTZ 25mg/d, Nifedipine 30mg/d.  -Started diuresis with Lasix 40mg IV daily.  -hold parameters placed as BP much improved.   -events noted of yesterday.  Would not over treat.  Hold this am dose.  -treat underlying depression/anxiety and not episodic rise in BP.       T1DM with hyperglycemia  - HgbA1C 10.9%.  - [place]. [place] at [facility]  - Insulin titration per primary team.     Anemia of Pregnancy with Iron deficiency  - Has received IV iron as outpatient.     Vitamin D deficiency  - On Ergocalciferol.     Chronic metabolic acidosis due to distal RTA from advanced CKD  - Has not been on bicarb tabs at home in recent weeks.  Resumed home dose of 650mg bid.    Thank you for allowing me to participate in the care of this patient.    Ok to DC home.  Meds as current on MAR.  F/u with [facility] 1-2 weeks.       Dinh Stewart, ADYP  Nephrology
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
## 2020-11-11 NOTE — ASSESSMENT & PLAN NOTE
- BP: ()/(54-93) 119/73  - AST/ALT wnl;  Plt 444  - Creatinine increased from 2.0 to 3.0 on admission   - 3.2 > 3.7  - s/p Procardia 20, Labetalol 20/40/80/20/40/80   - home procardia increased to 60XL   - Coreg increased to BID dosing.Will hold HCTZ per nephro recs.   - s/p magnesium for seizure ppx

## 2020-11-11 NOTE — ASSESSMENT & PLAN NOTE
- likely secondary to renal disease and worsened renal function  - K: 6.2 on admission  - Peaked T waves on EKG. Calcium gluconate/insulin/albuterol given on admission. One recurrence in peaked T waves since admission & protocol repeated for hyperkalemia.   - Remains stable

## 2020-11-11 NOTE — CARE UPDATE
Spoke to Nephro NP. Pt okay to be discharged without anticoagulation as she will be undergoing kidney biopsy soon. They will manage anticoagulation after biopsy.    ARMIN Cárdenas MD  OBGYN PGY-2

## 2020-11-11 NOTE — ASSESSMENT & PLAN NOTE
- Home regimen: Lantus 20 u QHS w/ Carb ratio: 1/9, 1/8, 1/9 for B/L/D respectively  - Detemir 8AM/8QHS inpatient. Sliding scale as needed.   - Beta-hydroxybutyrate on admission was 0.1  - Inpatient carb ratio 1:10 w/ all meals; Insulin sensitivity factor of 35.

## 2020-11-11 NOTE — ASSESSMENT & PLAN NOTE
- CKD IIIB - IV  - Baseline Creatinine 2.0  - Increased Creatinine to 3.0 consistent with worsening renal function in setting of PreE w/ SF   - Cr stabilizing around 3.6 - 3.7  - Severe proteinuria. On Lovenox. Held in setting of induction.    - Patient now on Heparin 5000u TID  - Nephrology consulted, appreciate recs   - rec d/c IVF, and avoiding LR when hyperkalemic    - Lasix 40 IV daily postpartum    - Bicarb 650 BID  - pt ok to discharge from nephrology standpoint

## 2020-11-11 NOTE — NURSING
Results for SIXTO CARNEY (MRN [id number]) as of 11/11/2020 12:15   Ref. Range 11/11/2020 08:24 11/11/2020 11:07   POCT Glucose Latest Ref Range: 70 - 110 mg/dL 210 (H) 161 (H)   Ac ijiwkhjea=823;  notified and order for 5u aspart + 3u aspart to cover 210 accucheck.; 2 hr postprandial = 161

## 2020-11-12 ENCOUNTER — SSC ENCOUNTER (OUTPATIENT)
Dept: ADMINISTRATIVE | Facility: OTHER | Age: 25
End: 2020-11-12

## 2020-11-12 LAB
FINAL PATHOLOGIC DIAGNOSIS: NORMAL
GROSS: NORMAL
Lab: NORMAL

## 2020-11-12 NOTE — DISCHARGE INSTRUCTIONS
DISCONTINUE taking HEPARIN or LOVENOX per the Nephrology doctors. Review the Postpartum booklet and Post-Birth Warning Signs sheet Call Labor & Delivery OB-ED, [phone], for any signs of infection: severe pain in lower abdomen, foul-smelling vaginal fluid, fever over 100.4, racing heartrate; call for problems controlling your blood sugars; call for issues with severe depression where you don't want to live or take care of yourself; Call Nephrology for any kidney issues: low urine output, weight gain; continue to take the medications already prescribed to you and keep your scheduled appointments

## 2020-11-12 NOTE — PROGRESS NOTES
Referral for case management received from [facility]  via the [facility] email box/[employer] secure email portal. Patient is being enrolled to Outpatient Case Management. Please see the information below:    Diagnosis:   *O80  Encounter for full-term uncomplicated delivery   E87.5  Hyperkalemia      Please contact [facility] with any questions (ext. [phone]).    Thank you,    Corina Wells, [facility]  Outpatient Case Mgmnt  [phone]

## 2020-11-12 NOTE — PLAN OF CARE
Vital signs stable with po blood pressure meds; accuchecks improved c insulin changes; CMP slightly improved; postpartum progress WDL; patient grief response appropriate; discharge instructions, appointments given and discharged to home

## 2020-11-12 NOTE — NURSING
Results for SIXTO CARNEY (MRN [id number]) as of 11/11/2020 18:53   Ref. Range 11/11/2020 16:22 11/11/2020 18:16   POCT Glucose Latest Ref Range: 70 - 110 mg/dL 128 (H) 136 (H)   2hr postpprandial lunch - 128; ac dinner -136; reported to Dr.Toppin richards accucheck

## 2020-11-16 NOTE — L&D DELIVERY NOTE
[facility]  Vaginal Delivery   Operative Note    SUMMARY     MD presented to bedside to check cervix. Fetal parts palpated in the vagina, with footling breech presentation noted. Patient was set up to push. Fetal body delivered with pushing, breech maneuvers were performed. Head was noted to be entrapped in the cervix. Cervix was stretched gently around the fetal head, and eventually fetal head delivered. Cord was clamped x 2 and cut. No fetal movement was noted, and on auscultation with stethoscope no heart tones were appreciated. Infant was placed in a blanket and taken to the family of the patient.     Attention was turned to the placenta, which delivered spontaneously and was sent to pathology. No cervical or vaginal tears were noted on exam. Uterine tone was noted to be firm, with no bleeding.    Patient tolerated delivery well. Sponge needle and lap counted correctly x2.    Indications:  (normal spontaneous vaginal delivery)  Pregnancy complicated by:   Patient Active Problem List   Diagnosis    Chest pain    Microalbuminuria    Dyspnea on exertion    Type 1 diabetes mellitus with diabetic chronic kidney disease    Ventricular bigeminy    Bilateral lower extremity edema    Vitamin D deficiency    Mixed hyperlipidemia    Renal disease    Anemia in pregnancy    Restrictive lung disease    Hypoglycemia associated with diabetes    Palpitations    Iron deficiency anemia    Poor fetal growth affecting management of mother in second trimester    Chronic hypertension with exacerbation during pregnancy in second trimester    Severe pre-eclampsia in second trimester    Hyperkalemia    Anxiety    Encounter for induction of labor     (w/ fetal demise)     Admitting GA: 23w2d    Delivery Information for Girl Isabela Read    Birth information:  YOB: 2020   Time of birth: 11:13 PM   Sex: female   Head Delivery Date/Time: 2020 11:13 PM   Delivery type:  Vaginal, Breech   Gestational Age: 23w2d    Delivery Providers    Delivering clinician: Nidia Myles MD   Provider Role    Sushma K Reddy, MD Macy Cope, MD Nichole Medina RN Elizabeth Jackson, ST             Measurements    Weight: 255 g  Length:          Apgars    Living status: Fetal Demise  Apgars:  1 min.:  5 min.:  10 min.:  15 min.:  20 min.:    Skin color:  0  0  0  0  0    Heart rate:  0  0  0  0  0    Reflex irritability:  0  0  0  0  0    Muscle tone:  0  0  0  0  0    Respiratory effort:  0  0  0  0  0    Total:  0  0  0  0  0           Operative Delivery    Forceps attempted?: No  Vacuum extractor attempted?: No         Shoulder Dystocia    Shoulder dystocia present?: No           Presentation    Presentation: Footling Breech  Position: Right Sacrum Posterior           Interventions/Resuscitation    Method: None       Cord    Vessels: Unknown  Complications: None  Delayed Cord Clamping?: No  Gases Sent?: No  Stem Cell Collection (by MD): No       Placenta    Placenta delivery date/time: 2020 2316  Placenta removal: Spontaneous  Placenta appearance: Intact  Placenta disposition: pathology           Labor Events:       labor:       Labor Onset Date/Time:         Dilation Complete Date/Time:         Start Pushing Date/Time: 2020 23:10       Start Pushing Date/Time: 2020 23:10     Rupture Date/Time:            Rupture type:          Fluid Amount:       Fluid Color:       Fluid Odor:       Membrane Status:                steroids:       Antibiotics given for GBS:       Induction:       Indications for induction:        Augmentation:       Indications for augmentation:       Labor complications: None     Additional complications:          Cervical ripening:                     Delivery:      Episiotomy: None     Indication for Episiotomy:       Perineal Lacerations: None Repaired:      Periurethral Laceration:   Repaired:     Labial  Laceration:   Repaired:     Sulcus Laceration:   Repaired:     Vaginal Laceration:   Repaired:     Cervical Laceration:   Repaired:     Repair suture: None     Repair # of packets: 0     Last Value - EBL - Nursing (mL):       Sum - EBL - Nursing (mL): 0     Last Value - EBL - Anesthesia (mL):      Calculated QBL (mL):       Vaginal Sweep Performed: Yes     Surgicount Correct: Yes       Other providers:       Anesthesia    Method: Epidural          Details (if applicable):  Trial of Labor      Categorization:      Priority:     Indications for :     Incision Type:       Additional  information:  Forceps:    Vacuum:    Breech:    Observed anomalies    Other (Comments):

## 2020-11-20 ENCOUNTER — OUTPATIENT CASE MANAGEMENT (OUTPATIENT)
Dept: ADMINISTRATIVE | Facility: OTHER | Age: 25
End: 2020-11-20

## 2020-11-20 NOTE — LETTER
November 24, 2020    Isabela MENSAH Jacek  [address] [address]             [facility] [address] [facility] [address] Dear Ms Read,    I work with [facility] Outpatient Case Management Department. We received a referral to call you to discuss your medical history.These services are free of charge and are offered to [facility] patients who have recently been discharged from any of our facilities or who have complex medical conditions that may require the skill of a nurse to assist with management.             I am a Registered Nurse who specializes in connecting patients with available medical and financial resources as well as addressing any educational needs that may be indicated.      I attempted to reach you by telephone, but I was unsuccessful. Please call our department so that we can go over some questions with you regarding your health.    The Outpatient Case Management Department can be reached at [phone] from 8:00AM to 4:30 PM on Monday thru Friday. [facility] also has a program where a nurse is available 24/7 to answer questions or provide medical advice, their number is [phone].    Thanks,      Francie Rivera, RN  Outpatient Complex Case Management/Disease Management   [phone]

## 2020-11-24 NOTE — PROGRESS NOTES
Outpatient Care Management  Patient Does Not Consent    Patient: Isabela Read  MRN:  [id number]  Date of Service:  11/24/2020  Completed by:  Francie Rivera RN    Chief Complaint   Patient presents with    OPCM Chart Review     11/20/20    OPCM RN First Assessment Attempt     11/23/20    OPCM RN Second Assessment Attempt     11/24/20    Case Closure     11/24/20-declined OPCM       Patient Summary     Program:  OPCM High Risk  Qualification Status:  No             Patient declined OPCM. Letter has been sent with OPCM contact information in case any needs arise in the future. Closing case at this time.

## 2020-11-28 ENCOUNTER — PATIENT MESSAGE (OUTPATIENT)
Dept: ADMINISTRATIVE | Facility: OTHER | Age: 25
End: 2020-11-28

## 2020-11-30 ENCOUNTER — OFFICE VISIT (OUTPATIENT)
Dept: NEPHROLOGY | Facility: CLINIC | Age: 25
End: 2020-11-30
Payer: COMMERCIAL

## 2020-11-30 VITALS
BODY MASS INDEX: 28.79 KG/M2 | SYSTOLIC BLOOD PRESSURE: 122 MMHG | WEIGHT: 168.63 LBS | HEIGHT: 64 IN | HEART RATE: 85 BPM | DIASTOLIC BLOOD PRESSURE: 80 MMHG | OXYGEN SATURATION: 99 %

## 2020-11-30 DIAGNOSIS — N18.32 STAGE 3B CHRONIC KIDNEY DISEASE: Primary | ICD-10-CM

## 2020-11-30 PROCEDURE — 99999 PR PBB SHADOW E&M-EST. PATIENT-LVL V: ICD-10-PCS | Mod: PBBFAC,,, | Performed by: INTERNAL MEDICINE

## 2020-11-30 PROCEDURE — 99214 OFFICE O/P EST MOD 30 MIN: CPT | Mod: S$GLB,,, | Performed by: INTERNAL MEDICINE

## 2020-11-30 PROCEDURE — 99999 PR PBB SHADOW E&M-EST. PATIENT-LVL V: CPT | Mod: PBBFAC,,, | Performed by: INTERNAL MEDICINE

## 2020-11-30 PROCEDURE — 99214 PR OFFICE/OUTPT VISIT, EST, LEVL IV, 30-39 MIN: ICD-10-PCS | Mod: S$GLB,,, | Performed by: INTERNAL MEDICINE

## 2020-11-30 RX ORDER — SODIUM BICARBONATE 650 MG/1
650 TABLET ORAL 2 TIMES DAILY
Qty: 30 TABLET | Refills: 11
Start: 2020-11-30 | End: 2021-03-26

## 2020-11-30 NOTE — PATIENT INSTRUCTIONS
- Please take lasix 80 mg twice a day.    - The patient was educated in practicing a low salt diet.  · Avoid high salt foods (olives, pickles, smoked meats, salted potato chips, etc.).   · Do not add salt to your food at the table.   · Use only small amounts of salt when cooking.

## 2020-12-01 ENCOUNTER — LAB VISIT (OUTPATIENT)
Dept: LAB | Facility: HOSPITAL | Age: 25
End: 2020-12-01
Attending: INTERNAL MEDICINE
Payer: COMMERCIAL

## 2020-12-01 DIAGNOSIS — N18.32 STAGE 3B CHRONIC KIDNEY DISEASE: ICD-10-CM

## 2020-12-01 LAB
25(OH)D3+25(OH)D2 SERPL-MCNC: 10 NG/ML (ref 30–96)
ALBUMIN SERPL BCP-MCNC: 1.9 G/DL (ref 3.5–5.2)
ANION GAP SERPL CALC-SCNC: 6 MMOL/L (ref 8–16)
BASOPHILS # BLD AUTO: 0.04 K/UL (ref 0–0.2)
BASOPHILS NFR BLD: 0.8 % (ref 0–1.9)
BUN SERPL-MCNC: 24 MG/DL (ref 6–20)
CALCIUM SERPL-MCNC: 8.2 MG/DL (ref 8.7–10.5)
CHLORIDE SERPL-SCNC: 114 MMOL/L (ref 95–110)
CO2 SERPL-SCNC: 22 MMOL/L (ref 23–29)
CREAT SERPL-MCNC: 3.1 MG/DL (ref 0.5–1.4)
DIFFERENTIAL METHOD: ABNORMAL
EOSINOPHIL # BLD AUTO: 0.1 K/UL (ref 0–0.5)
EOSINOPHIL NFR BLD: 2.3 % (ref 0–8)
ERYTHROCYTE [DISTWIDTH] IN BLOOD BY AUTOMATED COUNT: 14.9 % (ref 11.5–14.5)
EST. GFR  (AFRICAN AMERICAN): 23 ML/MIN/1.73 M^2
EST. GFR  (NON AFRICAN AMERICAN): 20 ML/MIN/1.73 M^2
FERRITIN SERPL-MCNC: 113 NG/ML (ref 20–300)
GLUCOSE SERPL-MCNC: 103 MG/DL (ref 70–110)
HCT VFR BLD AUTO: 22 % (ref 37–48.5)
HGB BLD-MCNC: 6.6 G/DL (ref 12–16)
IMM GRANULOCYTES # BLD AUTO: 0.01 K/UL (ref 0–0.04)
IMM GRANULOCYTES NFR BLD AUTO: 0.2 % (ref 0–0.5)
IRON SERPL-MCNC: 67 UG/DL (ref 30–160)
LYMPHOCYTES # BLD AUTO: 2.1 K/UL (ref 1–4.8)
LYMPHOCYTES NFR BLD: 38.9 % (ref 18–48)
MCH RBC QN AUTO: 29.5 PG (ref 27–31)
MCHC RBC AUTO-ENTMCNC: 30 G/DL (ref 32–36)
MCV RBC AUTO: 98 FL (ref 82–98)
MONOCYTES # BLD AUTO: 0.4 K/UL (ref 0.3–1)
MONOCYTES NFR BLD: 8 % (ref 4–15)
NEUTROPHILS # BLD AUTO: 2.6 K/UL (ref 1.8–7.7)
NEUTROPHILS NFR BLD: 49.8 % (ref 38–73)
NRBC BLD-RTO: 0 /100 WBC
PHOSPHATE SERPL-MCNC: 5.6 MG/DL (ref 2.7–4.5)
PLATELET # BLD AUTO: 412 K/UL (ref 150–350)
PMV BLD AUTO: 9.7 FL (ref 9.2–12.9)
POTASSIUM SERPL-SCNC: 4.7 MMOL/L (ref 3.5–5.1)
PTH-INTACT SERPL-MCNC: 336 PG/ML (ref 9–77)
RBC # BLD AUTO: 2.24 M/UL (ref 4–5.4)
SATURATED IRON: 26 % (ref 20–50)
SODIUM SERPL-SCNC: 142 MMOL/L (ref 136–145)
TOTAL IRON BINDING CAPACITY: 258 UG/DL (ref 250–450)
TRANSFERRIN SERPL-MCNC: 174 MG/DL (ref 200–375)
WBC # BLD AUTO: 5.27 K/UL (ref 3.9–12.7)

## 2020-12-01 PROCEDURE — 83540 ASSAY OF IRON: CPT

## 2020-12-01 PROCEDURE — 83970 ASSAY OF PARATHORMONE: CPT

## 2020-12-01 PROCEDURE — 82728 ASSAY OF FERRITIN: CPT

## 2020-12-01 PROCEDURE — 36415 COLL VENOUS BLD VENIPUNCTURE: CPT

## 2020-12-01 PROCEDURE — 85025 COMPLETE CBC W/AUTO DIFF WBC: CPT

## 2020-12-01 PROCEDURE — 80069 RENAL FUNCTION PANEL: CPT

## 2020-12-01 PROCEDURE — 82306 VITAMIN D 25 HYDROXY: CPT

## 2020-12-02 NOTE — PROGRESS NOTES
REASON FOR FOLLOW UP/CHIEF COMPLAIN: Chronic Kidney disease in pregnancy    REFERRING PHYSICIAN: Primary Doctor No    HISTORY OF PRESENT ILLNESS: 25 y.o. female   has a past medical history of Anemia, Chronic kidney disease, Diabetes mellitus, Hypertension, Nephrotic syndrome, and Restrictive lung disease. patient was referred here for abnormal renal function. She has had DM 1 since age 8, HTN since 10 year ago, started taking medications for HTN since 2018. No chronic NSAID use, no known exposure to lithium, lead. She attempted pregnancy in 2020 which had to be terminated around 23 weeks (Nov 6th) of gestation due to complications.   Denied any issues with urination including dysuria, hematuria, urgency, hesitancy, nocturia, incomplete voiding. Denied chest pain, nausea, vomiting, abd pain, nausea, vomiting, diarrhea, shortness of breath, Orthopnea, PND. Has some pedal edema to the level of upper one third of leg.      ROS:  General: negative for chills, or fatigue  ENT: No epistaxis or headaches  Hematological and Lymphatic: No bleeding problems or blood clots.  Endocrine: No skin changes or temperature intolerance  Respiratory: No cough, shortness of breath, or wheezing  Cardiovascular: No chest pain or dyspnea   Gastrointestinal: No abdominal pain, change in bowel habits  Genito-Urinary: No dysuria, trouble voiding, or hematuria  Musculoskeletal: ROS: negative for - joint pain, joint stiffness, joint swelling, muscle pain or muscular weakness  Neurological: No new focal weakness, no numbness  Dermatological: No rash or ulcers.    PAST MEDICAL HISTORY:  Past Medical History:   Diagnosis Date    Anemia     Chronic kidney disease     Diabetes mellitus     Hypertension     Nephrotic syndrome     Restrictive lung disease        PAST SURGICAL HISTORY:  No past surgical history on file.    FAMILY HISTORY:   Family History   Problem Relation Age of Onset    Heart disease Father     Diabetes Brother        SOCIAL  HISTORY:  Social History     Socioeconomic History    Marital status: Single     Spouse name: Not on file    Number of children: Not on file    Years of education: Not on file    Highest education level: Not on file   Occupational History    Occupation:  at [employer]   Social Needs    Financial resource strain: Not on file    Food insecurity     Worry: Not on file     Inability: Not on file    Transportation needs     Medical: Not on file     Non-medical: Not on file   Tobacco Use    Smoking status: Never Smoker    Smokeless tobacco: Never Used   Substance and Sexual Activity    Alcohol use: No    Drug use: No    Sexual activity: Yes     Partners: Male     Comment: monogamous   Lifestyle    Physical activity     Days per week: Not on file     Minutes per session: Not on file    Stress: Not on file   Relationships    Social connections     Talks on phone: Not on file     Gets together: Not on file     Attends [employer] service: Not on file     Active member of club or organization: Not on file     Attends meetings of clubs or organizations: Not on file     Relationship status: Not on file   Other Topics Concern    Not on file   Social History Narrative    Not on file       ALLERGIES:  Review of patient's allergies indicates:  No Known Allergies    MEDICATIONS:    Current Outpatient Medications:     aspirin (ECOTRIN) 81 MG EC tablet, Take 1 tablet (81 mg total) by mouth every evening., Disp: 150 tablet, Rfl: 1    blood sugar diagnostic Strp, To check BG 4 - 6 times daily, to use with insurance preferred meter, Disp: 150 each, Rfl: 11    blood-glucose meter kit, To check BG 4 times daily, to use with insurance preferred meter, Disp: 1 each, Rfl: 1    carvediloL (COREG) 25 MG tablet, Take 1 tablet (25 mg total) by mouth 2 (two) times daily., Disp: 60 tablet, Rfl: 11    ergocalciferol (ERGOCALCIFEROL) 50,000 unit Cap, Take 1 capsule (50,000 Units total) by mouth every 7 days., Disp: 12  capsule, Rfl: 1    escitalopram oxalate (LEXAPRO) 20 MG tablet, Take 1 tablet (20 mg total) by mouth once daily., Disp: 30 tablet, Rfl: 11    ferrous sulfate 325 (65 FE) MG EC tablet, Take 1 tablet (325 mg total) by mouth 2 (two) times daily., Disp: 180 tablet, Rfl: 2    flash glucose scanning reader (FREESTYLE MARCY 14 DAY READER) Misc, 1 each by Misc.(Non-Drug; Combo Route) route once daily., Disp: 1 each, Rfl: 0    fluocinolone and shower cap (DERMA-SMOOTHE/FS SCALP OIL) 0.01 % Oil, Apply oil to damp scalp nightly and cover with shower cap., Disp: 1 Bottle, Rfl: 3    FREESTYLE MARCY 14 DAY SENSOR Kit, USE 2 SENSORS TO CHECK GLUCOSE ONCE DAILY EVERY  14 DAYS, Disp: 2 kit, Rfl: 4    furosemide (LASIX) 40 MG tablet, Take 1 tablet (40 mg total) by mouth every other day., Disp: 15 tablet, Rfl: 11    insulin detemir U-100 (LEVEMIR FLEXTOUCH) 100 unit/mL (3 mL) SubQ InPn pen, Inject 8 Units into the skin once daily., Disp: 7.2 mL, Rfl: 3    insulin detemir U-100 (LEVEMIR FLEXTOUCH) 100 unit/mL (3 mL) SubQ InPn pen, Inject 9 Units into the skin every evening., Disp: 8.1 mL, Rfl: 3    insulin lispro (HUMALOG KWIKPEN INSULIN) 100 unit/mL pen, Inject 10 units into skin the skin 3 three times daily with meals (Patient taking differently: 1-8 units with meals and 1 per 35 units over 120 for correction dose), Disp: 15 mL, Rfl: 6    iron,carbon,gluc-FA-B12-C-dss (FERRALET 90 DUAL/CITRANATAL BLOOM) 90-1-12-50 mg-mg-mcg-mg Tab, Take 1 tablet by mouth once daily., Disp: 30 tablet, Rfl: 6    ketoconazole (NIZORAL) 2 % shampoo, Wash hair with medicated shampoo at least 2x/week - let sit on scalp at least 5 minutes prior to rinsing, Disp: 120 mL, Rfl: 5    lancets Misc, To check BG 4 - 6 times daily, to use with insurance preferred meter, Disp: 150 each, Rfl: 11    NIFEdipine (PROCARDIA-XL) 60 MG (OSM) 24 hr tablet, Take 1 tablet (60 mg total) by mouth once daily., Disp: 30 tablet, Rfl: 11    pen needle, diabetic 32  "gauge x 5/32" Ndle, For three times daily injection, Disp: 100 each, Rfl: 11    sodium bicarbonate 650 MG tablet, Take 1 tablet (650 mg total) by mouth 2 (two) times daily., Disp: 30 tablet, Rfl: 11    syringe with needle (ECLIPSE SYRINGE) 1 mL 25 gauge x 5/8" Syrg, use for heparin injections three times daily, Disp: 90 each, Rfl: 0    traZODone (DESYREL) 50 MG tablet, Take 1 tablet (50 mg total) by mouth nightly as needed for Insomnia., Disp: 30 tablet, Rfl: 1   Medication List with Changes/Refills   Current Medications    ASPIRIN (ECOTRIN) 81 MG EC TABLET    Take 1 tablet (81 mg total) by mouth every evening.    BLOOD SUGAR DIAGNOSTIC STRP    To check BG 4 - 6 times daily, to use with insurance preferred meter    BLOOD-GLUCOSE METER KIT    To check BG 4 times daily, to use with insurance preferred meter    CARVEDILOL (COREG) 25 MG TABLET    Take 1 tablet (25 mg total) by mouth 2 (two) times daily.    ERGOCALCIFEROL (ERGOCALCIFEROL) 50,000 UNIT CAP    Take 1 capsule (50,000 Units total) by mouth every 7 days.    ESCITALOPRAM OXALATE (LEXAPRO) 20 MG TABLET    Take 1 tablet (20 mg total) by mouth once daily.    FERROUS SULFATE 325 (65 FE) MG EC TABLET    Take 1 tablet (325 mg total) by mouth 2 (two) times daily.    FLASH GLUCOSE SCANNING READER (FREESTYLE MARCY 14 DAY READER) MISC    1 each by Misc.(Non-Drug; Combo Route) route once daily.    FLUOCINOLONE AND SHOWER CAP (DERMA-SMOOTHE/FS SCALP OIL) 0.01 % OIL    Apply oil to damp scalp nightly and cover with shower cap.    FREESTYLE MARCY 14 DAY SENSOR KIT    USE 2 SENSORS TO CHECK GLUCOSE ONCE DAILY EVERY  14 DAYS    FUROSEMIDE (LASIX) 40 MG TABLET    Take 1 tablet (40 mg total) by mouth every other day.    INSULIN DETEMIR U-100 (LEVEMIR FLEXTOUCH) 100 UNIT/ML (3 ML) SUBQ INPN PEN    Inject 8 Units into the skin once daily.    INSULIN DETEMIR U-100 (LEVEMIR FLEXTOUCH) 100 UNIT/ML (3 ML) SUBQ INPN PEN    Inject 9 Units into the skin every evening.    INSULIN " "LISPRO (HUMALOG KWIKPEN INSULIN) 100 UNIT/ML PEN    Inject 10 units into skin the skin 3 three times daily with meals    IRON,CARBON,GLUC-FA-B12-C-DSS (FERRALET 90 DUAL/CITRANATAL BLOOM) 90-1-12-50 MG-MG-MCG-MG TAB    Take 1 tablet by mouth once daily.    KETOCONAZOLE (NIZORAL) 2 % SHAMPOO    Wash hair with medicated shampoo at least 2x/week - let sit on scalp at least 5 minutes prior to rinsing    LANCETS MISC    To check BG 4 - 6 times daily, to use with insurance preferred meter    NIFEDIPINE (PROCARDIA-XL) 60 MG (OSM) 24 HR TABLET    Take 1 tablet (60 mg total) by mouth once daily.    PEN NEEDLE, DIABETIC 32 GAUGE X 5/32" NDLE    For three times daily injection    SYRINGE WITH NEEDLE (ECLIPSE SYRINGE) 1 ML 25 GAUGE X 5/8" SYRG    use for heparin injections three times daily    TRAZODONE (DESYREL) 50 MG TABLET    Take 1 tablet (50 mg total) by mouth nightly as needed for Insomnia.   Changed and/or Refilled Medications    Modified Medication Previous Medication    SODIUM BICARBONATE 650 MG TABLET sodium bicarbonate 650 MG tablet       Take 1 tablet (650 mg total) by mouth 2 (two) times daily.    Take 1 tablet (650 mg total) by mouth once daily.   Discontinued Medications    HEPARIN SODIUM,PORCINE (HEPARIN, PORCINE,) 5,000 UNIT/ML INJECTION    Inject 1 mL (5,000 Units total) into the skin every 8 (eight) hours.    PROMETHAZINE (PHENERGAN) 12.5 MG TAB    Take 1 tablet (12.5 mg total) by mouth every 8 (eight) hours as needed.        PHYSICAL EXAM:  /80   Pulse 85   Ht 5' 4" (1.626 m)   Wt 76.5 kg (168 lb 10.4 oz)   LMP  (LMP Unknown) Comment: pt. states last cycle was in May. She is not sure of the exact date.  SpO2 99%   BMI 28.95 kg/m²     General: No distress, No fever or chills  Head: Normocephalic,atraumatic  Eyes: conjunctivae/corneas clear. PERRL, EOM's intact.  Nose: Nares normal. Mucosa normal. No drainage or sinus tenderness.  Neck: No adenopathy,no carotid bruit,no JVD  Lungs:Clear to " auscultation bilaterally, No Crackles  Heart: Regular rate and rhythm, no murmur, gallops or rubs  Abdomen: Soft, no tenderness, bowel sounds normal  Extremities: Atraumatic, improved edema around ankles  Skin: Turgor normal. No rashes or ulcers  Neurologic: No focal weakness, oriented.  Dialysis Access: Non applicable         LABS:  Lab Results   Component Value Date    HGB 6.6 (L) 12/01/2020        Lab Results   Component Value Date    CREATININE 3.1 (H) 12/01/2020       Prot/Creat Ratio, Urine   Date Value Ref Range Status   12/01/2020 8.98 (H) 0.00 - 0.20 Final   11/06/2020 16.49 (H) 0.00 - 0.20 Final   10/23/2020 12.70 (H) 0.00 - 0.20 Final       Lab Results   Component Value Date     12/01/2020    K 4.7 12/01/2020    CO2 22 (L) 12/01/2020       last PTH   Lab Results   Component Value Date    .0 (H) 12/01/2020    CALCIUM 8.2 (L) 12/01/2020    PHOS 5.6 (H) 12/01/2020       Lab Results   Component Value Date    HGBA1C 10.9 (H) 11/03/2020       Lab Results   Component Value Date    LDLCALC 171.2 (H) 02/25/2019         Creatinine, Urine   Date Value Ref Range Status   12/01/2020 131.0 15.0 - 325.0 mg/dL Final     Comment:     The random urine reference ranges provided were established   for 24 hour urine collections.  No reference ranges exist for  random urine specimens.  Correlate clinically.     11/06/2020 48.7 15.0 - 325.0 mg/dL Final     Comment:     The random urine reference ranges provided were established   for 24 hour urine collections.  No reference ranges exist for  random urine specimens.  Correlate clinically.     10/23/2020 61.0 15.0 - 325.0 mg/dL Final     Comment:     The random urine reference ranges provided were established   for 24 hour urine collections.  No reference ranges exist for  random urine specimens.  Correlate clinically.         Protein, Urine   Date Value Ref Range Status   08/05/2020 503 (H) 0 - 15 mg/dL Final     Protein, Urine Random   Date Value Ref Range Status    12/01/2020 1177 (H) 0 - 15 mg/dL Final     Comment:     The random urine reference ranges provided were established   for 24 hour urine collections.  No reference ranges exist for  random urine specimens.  Correlate clinically.     11/06/2020 803 (H) 0 - 15 mg/dL Final     Comment:     The random urine reference ranges provided were established   for 24 hour urine collections.  No reference ranges exist for  random urine specimens.  Correlate clinically.     10/23/2020 775 (H) 0 - 15 mg/dL Final     Comment:     The random urine reference ranges provided were established   for 24 hour urine collections.  No reference ranges exist for  random urine specimens.  Correlate clinically.         Prot/Creat Ratio, Urine   Date Value Ref Range Status   12/01/2020 8.98 (H) 0.00 - 0.20 Final   11/06/2020 16.49 (H) 0.00 - 0.20 Final   10/23/2020 12.70 (H) 0.00 - 0.20 Final       ASSESSMENT:  1) Chronic Kidney Disease stage 4  2) Nephrotic Range Proteinuria  3) Hypoalbuminemia  4) Hyperkalemia - improved   Renal ultrasound from 8/2020 showed 12.7 and 12.1 cm kidneys. CKD likely secondary to DM. Patients creatinine has been 1.8 mg/dl in 2019 prior to her pregnancy. Since her pregnancy started it has progressively increased and is now 3.1 mg/dl. She also has significant proteinuria.    Reinforced low potassium diet. Acid-base in acceptable range. Continue Vitamin D.  - Will start ARB visit once creatinine stabilizes.  - Increase lasix to 80 mg BID to help with edema, titrate down to 80 mg daily with improvement.  - Continue sodium bicarbonate 650 mg BID.    5) Anemia  Patient received 1000 mg of iron during pregnancy, however she lost some blood during her delivery. Iron panel looks ok.  - Will have to confirm if patient is taking iron supplements, if no improvement with those, iron infusions again.    6) Uncontrolled HTN   She is now on Coreg 25 mg BID, HCTZ 25 mg daily and Procardia 60 mg daily. Blood pressure today is in  acceptable range.    7) Vitamin D deficiency/Secondary hyperparathyroidism  Continue Ergocalciferol    8) DM  Being followed by endocrine.      RTC 3-4 weeks    Diagnosis and plan of care explained to the patient and mother.  Both Verbalized understanding. Answered all questions.  Thanks for allowing me to participate in the care of this patient.     3:59 PM    Mom Number - [phone].    Oswald Kruger MD  Nephrology & Critical Care    Tried to reach patient and mom to discuss the lab results, no answer.

## 2020-12-07 ENCOUNTER — PATIENT MESSAGE (OUTPATIENT)
Dept: NEPHROLOGY | Facility: CLINIC | Age: 25
End: 2020-12-07

## 2020-12-07 DIAGNOSIS — N18.4 CKD (CHRONIC KIDNEY DISEASE) STAGE 4, GFR 15-29 ML/MIN: Primary | ICD-10-CM

## 2020-12-07 RX ORDER — FUROSEMIDE 40 MG/1
80 TABLET ORAL 2 TIMES DAILY
Qty: 360 TABLET | Refills: 3 | Status: SHIPPED | OUTPATIENT
Start: 2020-12-07 | End: 2020-12-10 | Stop reason: SDUPTHER

## 2020-12-10 ENCOUNTER — PATIENT MESSAGE (OUTPATIENT)
Dept: ENDOCRINOLOGY | Facility: CLINIC | Age: 25
End: 2020-12-10

## 2020-12-10 DIAGNOSIS — N18.4 CKD (CHRONIC KIDNEY DISEASE) STAGE 4, GFR 15-29 ML/MIN: ICD-10-CM

## 2020-12-10 RX ORDER — FUROSEMIDE 40 MG/1
80 TABLET ORAL 2 TIMES DAILY
Qty: 360 TABLET | Refills: 3 | Status: SHIPPED | OUTPATIENT
Start: 2020-12-10 | End: 2021-03-26

## 2020-12-11 DIAGNOSIS — E10.22 TYPE 1 DIABETES MELLITUS WITH STAGE 3 CHRONIC KIDNEY DISEASE: ICD-10-CM

## 2020-12-11 DIAGNOSIS — N18.30 TYPE 1 DIABETES MELLITUS WITH STAGE 3 CHRONIC KIDNEY DISEASE: ICD-10-CM

## 2020-12-14 RX ORDER — FLASH GLUCOSE SENSOR
KIT MISCELLANEOUS
Qty: 2 KIT | Refills: 4 | Status: ON HOLD | OUTPATIENT
Start: 2020-12-14 | End: 2021-05-24

## 2020-12-15 ENCOUNTER — TELEPHONE (OUTPATIENT)
Dept: ENDOCRINOLOGY | Facility: CLINIC | Age: 25
End: 2020-12-15

## 2020-12-18 ENCOUNTER — TELEPHONE (OUTPATIENT)
Dept: ENDOCRINOLOGY | Facility: CLINIC | Age: 25
End: 2020-12-18

## 2020-12-30 ENCOUNTER — TELEPHONE (OUTPATIENT)
Dept: NEPHROLOGY | Facility: CLINIC | Age: 25
End: 2020-12-30

## 2020-12-30 DIAGNOSIS — N18.4 CKD (CHRONIC KIDNEY DISEASE) STAGE 4, GFR 15-29 ML/MIN: Primary | ICD-10-CM

## 2020-12-30 NOTE — TELEPHONE ENCOUNTER
Patient skipped her appointment today, called patient multiple times couldn't get her. Called her mother who informed me that pt leg swelling is better and also patient has been having some GI symptoms for past few days. Other than that per mom patient is doing ok. Requested mom to see if she can get patient for some labs tomorrow, she verbalized understanding.

## 2020-12-31 ENCOUNTER — LAB VISIT (OUTPATIENT)
Dept: LAB | Facility: HOSPITAL | Age: 25
End: 2020-12-31
Attending: INTERNAL MEDICINE
Payer: COMMERCIAL

## 2020-12-31 ENCOUNTER — TELEPHONE (OUTPATIENT)
Dept: OBSTETRICS AND GYNECOLOGY | Facility: CLINIC | Age: 25
End: 2020-12-31

## 2020-12-31 DIAGNOSIS — N18.4 CKD (CHRONIC KIDNEY DISEASE) STAGE 4, GFR 15-29 ML/MIN: ICD-10-CM

## 2020-12-31 LAB
ALBUMIN SERPL BCP-MCNC: 2 G/DL (ref 3.5–5.2)
ANION GAP SERPL CALC-SCNC: 7 MMOL/L (ref 8–16)
BASOPHILS # BLD AUTO: 0.04 K/UL (ref 0–0.2)
BASOPHILS NFR BLD: 0.6 % (ref 0–1.9)
BUN SERPL-MCNC: 27 MG/DL (ref 6–20)
CALCIUM SERPL-MCNC: 8.2 MG/DL (ref 8.7–10.5)
CHLORIDE SERPL-SCNC: 112 MMOL/L (ref 95–110)
CO2 SERPL-SCNC: 20 MMOL/L (ref 23–29)
CREAT SERPL-MCNC: 3.5 MG/DL (ref 0.5–1.4)
DIFFERENTIAL METHOD: ABNORMAL
EOSINOPHIL # BLD AUTO: 0.2 K/UL (ref 0–0.5)
EOSINOPHIL NFR BLD: 3.6 % (ref 0–8)
ERYTHROCYTE [DISTWIDTH] IN BLOOD BY AUTOMATED COUNT: 13.3 % (ref 11.5–14.5)
EST. GFR  (AFRICAN AMERICAN): 19.9 ML/MIN/1.73 M^2
EST. GFR  (NON AFRICAN AMERICAN): 17.3 ML/MIN/1.73 M^2
GLUCOSE SERPL-MCNC: 94 MG/DL (ref 70–110)
HCT VFR BLD AUTO: 24 % (ref 37–48.5)
HGB BLD-MCNC: 7.2 G/DL (ref 12–16)
IMM GRANULOCYTES # BLD AUTO: 0.02 K/UL (ref 0–0.04)
IMM GRANULOCYTES NFR BLD AUTO: 0.3 % (ref 0–0.5)
LYMPHOCYTES # BLD AUTO: 2.7 K/UL (ref 1–4.8)
LYMPHOCYTES NFR BLD: 44 % (ref 18–48)
MCH RBC QN AUTO: 29.5 PG (ref 27–31)
MCHC RBC AUTO-ENTMCNC: 30 G/DL (ref 32–36)
MCV RBC AUTO: 98 FL (ref 82–98)
MONOCYTES # BLD AUTO: 0.4 K/UL (ref 0.3–1)
MONOCYTES NFR BLD: 6.3 % (ref 4–15)
NEUTROPHILS # BLD AUTO: 2.8 K/UL (ref 1.8–7.7)
NEUTROPHILS NFR BLD: 45.2 % (ref 38–73)
NRBC BLD-RTO: 0 /100 WBC
PHOSPHATE SERPL-MCNC: 4.8 MG/DL (ref 2.7–4.5)
PLATELET # BLD AUTO: 359 K/UL (ref 150–350)
PMV BLD AUTO: 10.7 FL (ref 9.2–12.9)
POTASSIUM SERPL-SCNC: 4.2 MMOL/L (ref 3.5–5.1)
RBC # BLD AUTO: 2.44 M/UL (ref 4–5.4)
SODIUM SERPL-SCNC: 139 MMOL/L (ref 136–145)
WBC # BLD AUTO: 6.16 K/UL (ref 3.9–12.7)

## 2020-12-31 PROCEDURE — 80069 RENAL FUNCTION PANEL: CPT

## 2020-12-31 PROCEDURE — 85025 COMPLETE CBC W/AUTO DIFF WBC: CPT

## 2020-12-31 PROCEDURE — 36415 COLL VENOUS BLD VENIPUNCTURE: CPT

## 2020-12-31 NOTE — TELEPHONE ENCOUNTER
----- Message from Megan Rudolph MD sent at 12/30/2020  3:28 PM CST -----  Yes thanks!  ----- Message -----  From: Elaina Millan MA  Sent: 12/30/2020   1:08 PM CST  To: MD Bettie Cortés, okay I will let her know.  She's no longer pregnant, she should follow up with her PCP correct?  ----- Message -----  From: Megan Rudolph MD  Sent: 12/30/2020   1:06 PM CST  To: Rachele Schultz Staff    Bettie Delaney,   This patient had a very high BP on her connected mom (226/127).  Can you call her and find out if this was a real blood pressure?  If so, she needs to go to an emergency room.   Thanks!  Melody

## 2021-01-02 ENCOUNTER — PATIENT MESSAGE (OUTPATIENT)
Dept: OBSTETRICS AND GYNECOLOGY | Facility: CLINIC | Age: 26
End: 2021-01-02

## 2021-01-04 ENCOUNTER — TELEPHONE (OUTPATIENT)
Dept: ENDOCRINOLOGY | Facility: CLINIC | Age: 26
End: 2021-01-04

## 2021-01-08 ENCOUNTER — OFFICE VISIT (OUTPATIENT)
Dept: NEPHROLOGY | Facility: CLINIC | Age: 26
End: 2021-01-08
Payer: COMMERCIAL

## 2021-01-08 VITALS
DIASTOLIC BLOOD PRESSURE: 88 MMHG | OXYGEN SATURATION: 99 % | HEART RATE: 98 BPM | SYSTOLIC BLOOD PRESSURE: 124 MMHG | WEIGHT: 155.63 LBS | BODY MASS INDEX: 26.57 KG/M2 | HEIGHT: 64 IN

## 2021-01-08 DIAGNOSIS — N18.4 CKD (CHRONIC KIDNEY DISEASE) STAGE 4, GFR 15-29 ML/MIN: Primary | ICD-10-CM

## 2021-01-08 PROCEDURE — 99215 OFFICE O/P EST HI 40 MIN: CPT | Mod: PBBFAC | Performed by: INTERNAL MEDICINE

## 2021-01-08 PROCEDURE — 99999 PR PBB SHADOW E&M-EST. PATIENT-LVL V: ICD-10-PCS | Mod: PBBFAC,,, | Performed by: INTERNAL MEDICINE

## 2021-01-08 PROCEDURE — 99215 OFFICE O/P EST HI 40 MIN: CPT | Mod: S$GLB,,, | Performed by: INTERNAL MEDICINE

## 2021-01-08 PROCEDURE — 99215 PR OFFICE/OUTPT VISIT, EST, LEVL V, 40-54 MIN: ICD-10-PCS | Mod: S$GLB,,, | Performed by: INTERNAL MEDICINE

## 2021-01-08 PROCEDURE — 99999 PR PBB SHADOW E&M-EST. PATIENT-LVL V: CPT | Mod: PBBFAC,,, | Performed by: INTERNAL MEDICINE

## 2021-01-08 RX ORDER — SEVELAMER CARBONATE 800 MG/1
800 TABLET, FILM COATED ORAL
Qty: 90 TABLET | Refills: 11 | Status: SHIPPED | OUTPATIENT
Start: 2021-01-08 | End: 2021-03-10 | Stop reason: SDUPTHER

## 2021-01-08 RX ORDER — LOSARTAN POTASSIUM 50 MG/1
50 TABLET ORAL DAILY
Qty: 90 TABLET | Refills: 3 | Status: ON HOLD | OUTPATIENT
Start: 2021-01-08 | End: 2021-03-05 | Stop reason: HOSPADM

## 2021-01-14 ENCOUNTER — OUTPATIENT CASE MANAGEMENT (OUTPATIENT)
Dept: ADMINISTRATIVE | Facility: OTHER | Age: 26
End: 2021-01-14

## 2021-01-22 ENCOUNTER — OFFICE VISIT (OUTPATIENT)
Dept: OBSTETRICS AND GYNECOLOGY | Facility: CLINIC | Age: 26
End: 2021-01-22
Payer: COMMERCIAL

## 2021-01-22 VITALS
BODY MASS INDEX: 26.08 KG/M2 | DIASTOLIC BLOOD PRESSURE: 64 MMHG | SYSTOLIC BLOOD PRESSURE: 118 MMHG | WEIGHT: 152.75 LBS | HEIGHT: 64 IN

## 2021-01-22 DIAGNOSIS — M54.9 BACK PAIN, UNSPECIFIED BACK LOCATION, UNSPECIFIED BACK PAIN LATERALITY, UNSPECIFIED CHRONICITY: ICD-10-CM

## 2021-01-22 DIAGNOSIS — F43.21 GRIEF ASSOCIATED WITH LOSS OF FETUS: ICD-10-CM

## 2021-01-22 DIAGNOSIS — T83.32XA INTRAUTERINE CONTRACEPTIVE DEVICE THREADS LOST, INITIAL ENCOUNTER: ICD-10-CM

## 2021-01-22 PROCEDURE — 59430 PR CARE AFTER DELIVERY ONLY: ICD-10-PCS | Mod: ,,, | Performed by: OBSTETRICS & GYNECOLOGY

## 2021-01-22 PROCEDURE — 99999 PR PBB SHADOW E&M-EST. PATIENT-LVL III: ICD-10-PCS | Mod: PBBFAC,,, | Performed by: OBSTETRICS & GYNECOLOGY

## 2021-01-22 PROCEDURE — 99999 PR PBB SHADOW E&M-EST. PATIENT-LVL III: CPT | Mod: PBBFAC,,, | Performed by: OBSTETRICS & GYNECOLOGY

## 2021-01-22 PROCEDURE — 99213 OFFICE O/P EST LOW 20 MIN: CPT | Mod: PBBFAC | Performed by: OBSTETRICS & GYNECOLOGY

## 2021-01-22 RX ORDER — PROMETHAZINE HYDROCHLORIDE 12.5 MG/1
12.5 TABLET ORAL 4 TIMES DAILY
Qty: 30 TABLET | Refills: 1 | Status: ON HOLD | OUTPATIENT
Start: 2021-01-22 | End: 2021-10-24 | Stop reason: HOSPADM

## 2021-01-23 ENCOUNTER — HOSPITAL ENCOUNTER (OUTPATIENT)
Dept: RADIOLOGY | Facility: OTHER | Age: 26
Discharge: HOME | End: 2021-01-23
Attending: OBSTETRICS & GYNECOLOGY
Payer: MEDICAID

## 2021-01-23 DIAGNOSIS — T83.32XA INTRAUTERINE CONTRACEPTIVE DEVICE THREADS LOST, INITIAL ENCOUNTER: ICD-10-CM

## 2021-01-23 PROCEDURE — 76856 US EXAM PELVIC COMPLETE: CPT | Mod: TC,TXP

## 2021-01-23 PROCEDURE — 76856 US EXAM PELVIC COMPLETE: CPT | Mod: 26,NTX,, | Performed by: RADIOLOGY

## 2021-01-23 PROCEDURE — 76856 US PELVIS COMP WITH TRANSVAG NON-OB (XPD): ICD-10-PCS | Mod: 26,NTX,, | Performed by: RADIOLOGY

## 2021-01-23 PROCEDURE — 76830 US PELVIS COMP WITH TRANSVAG NON-OB (XPD): ICD-10-PCS | Mod: 26,NTX,, | Performed by: RADIOLOGY

## 2021-01-23 PROCEDURE — 76830 TRANSVAGINAL US NON-OB: CPT | Mod: 26,NTX,, | Performed by: RADIOLOGY

## 2021-01-25 ENCOUNTER — PATIENT MESSAGE (OUTPATIENT)
Dept: OBSTETRICS AND GYNECOLOGY | Facility: CLINIC | Age: 26
End: 2021-01-25

## 2021-01-26 ENCOUNTER — PATIENT MESSAGE (OUTPATIENT)
Dept: OBSTETRICS AND GYNECOLOGY | Facility: CLINIC | Age: 26
End: 2021-01-26

## 2021-01-26 DIAGNOSIS — T83.32XD INTRAUTERINE CONTRACEPTIVE DEVICE THREADS LOST, SUBSEQUENT ENCOUNTER: Primary | ICD-10-CM

## 2021-01-29 ENCOUNTER — HOSPITAL ENCOUNTER (OUTPATIENT)
Dept: RADIOLOGY | Facility: OTHER | Age: 26
Discharge: HOME | End: 2021-01-29
Attending: OBSTETRICS & GYNECOLOGY
Payer: MEDICAID

## 2021-01-29 DIAGNOSIS — T83.32XD INTRAUTERINE CONTRACEPTIVE DEVICE THREADS LOST, SUBSEQUENT ENCOUNTER: ICD-10-CM

## 2021-01-29 PROCEDURE — 74018 RADEX ABDOMEN 1 VIEW: CPT | Mod: 26,NTX,, | Performed by: RADIOLOGY

## 2021-01-29 PROCEDURE — 72170 XR PELVIS ROUTINE AP: ICD-10-PCS | Mod: 26,NTX,, | Performed by: RADIOLOGY

## 2021-01-29 PROCEDURE — 72170 X-RAY EXAM OF PELVIS: CPT | Mod: TC,FY,TXP

## 2021-01-29 PROCEDURE — 72170 X-RAY EXAM OF PELVIS: CPT | Mod: 26,NTX,, | Performed by: RADIOLOGY

## 2021-01-29 PROCEDURE — 74018 XR ABDOMEN AP 1 VIEW: ICD-10-PCS | Mod: 26,NTX,, | Performed by: RADIOLOGY

## 2021-01-29 PROCEDURE — 74018 RADEX ABDOMEN 1 VIEW: CPT | Mod: TC,FY,TXP

## 2021-02-01 ENCOUNTER — PATIENT MESSAGE (OUTPATIENT)
Dept: OBSTETRICS AND GYNECOLOGY | Facility: CLINIC | Age: 26
End: 2021-02-01

## 2021-02-02 ENCOUNTER — PATIENT MESSAGE (OUTPATIENT)
Dept: OBSTETRICS AND GYNECOLOGY | Facility: CLINIC | Age: 26
End: 2021-02-02

## 2021-02-02 ENCOUNTER — TELEPHONE (OUTPATIENT)
Dept: TRANSPLANT | Facility: CLINIC | Age: 26
End: 2021-02-02

## 2021-02-02 DIAGNOSIS — N18.4 CKD (CHRONIC KIDNEY DISEASE) STAGE 4, GFR 15-29 ML/MIN: Primary | ICD-10-CM

## 2021-02-03 ENCOUNTER — OFFICE VISIT (OUTPATIENT)
Dept: OBSTETRICS AND GYNECOLOGY | Facility: CLINIC | Age: 26
End: 2021-02-03
Payer: MEDICAID

## 2021-02-03 DIAGNOSIS — K31.84 GASTROPARESIS: ICD-10-CM

## 2021-02-03 DIAGNOSIS — Z30.431 ENCOUNTER FOR MANAGEMENT OF INTRAUTERINE CONTRACEPTIVE DEVICE (IUD), UNSPECIFIED IUD MANAGEMENT TYPE: ICD-10-CM

## 2021-02-03 PROCEDURE — 99213 PR OFFICE/OUTPT VISIT, EST, LEVL III, 20-29 MIN: ICD-10-PCS | Mod: 24,95,, | Performed by: OBSTETRICS & GYNECOLOGY

## 2021-02-03 PROCEDURE — 99213 OFFICE O/P EST LOW 20 MIN: CPT | Mod: 24,95,, | Performed by: OBSTETRICS & GYNECOLOGY

## 2021-02-04 ENCOUNTER — CLINICAL SUPPORT (OUTPATIENT)
Dept: REHABILITATION | Facility: OTHER | Age: 26
End: 2021-02-04
Attending: OBSTETRICS & GYNECOLOGY
Payer: COMMERCIAL

## 2021-02-04 DIAGNOSIS — M25.69 DECREASED RANGE OF MOTION OF TRUNK AND BACK: ICD-10-CM

## 2021-02-04 DIAGNOSIS — M54.9 BACK PAIN, UNSPECIFIED BACK LOCATION, UNSPECIFIED BACK PAIN LATERALITY, UNSPECIFIED CHRONICITY: ICD-10-CM

## 2021-02-04 DIAGNOSIS — R29.898 DECREASED STRENGTH OF LOWER EXTREMITY: ICD-10-CM

## 2021-02-04 PROCEDURE — 97110 THERAPEUTIC EXERCISES: CPT | Mod: PN

## 2021-02-04 PROCEDURE — 97162 PT EVAL MOD COMPLEX 30 MIN: CPT | Mod: PN

## 2021-02-05 ENCOUNTER — TELEPHONE (OUTPATIENT)
Dept: TRANSPLANT | Facility: CLINIC | Age: 26
End: 2021-02-05

## 2021-02-08 ENCOUNTER — TELEPHONE (OUTPATIENT)
Dept: OBSTETRICS AND GYNECOLOGY | Facility: CLINIC | Age: 26
End: 2021-02-08

## 2021-02-08 DIAGNOSIS — Z30.9 ENCOUNTER FOR CONTRACEPTIVE MANAGEMENT, UNSPECIFIED TYPE: Primary | ICD-10-CM

## 2021-02-08 DIAGNOSIS — Z76.82 ORGAN TRANSPLANT CANDIDATE: Primary | ICD-10-CM

## 2021-02-10 ENCOUNTER — TELEPHONE (OUTPATIENT)
Dept: TRANSPLANT | Facility: CLINIC | Age: 26
End: 2021-02-10

## 2021-02-10 ENCOUNTER — PATIENT MESSAGE (OUTPATIENT)
Dept: NEPHROLOGY | Facility: CLINIC | Age: 26
End: 2021-02-10

## 2021-02-18 ENCOUNTER — CLINICAL SUPPORT (OUTPATIENT)
Dept: REHABILITATION | Facility: OTHER | Age: 26
End: 2021-02-18
Attending: OBSTETRICS & GYNECOLOGY
Payer: COMMERCIAL

## 2021-02-18 DIAGNOSIS — M25.69 DECREASED RANGE OF MOTION OF TRUNK AND BACK: ICD-10-CM

## 2021-02-18 DIAGNOSIS — R29.898 DECREASED STRENGTH OF LOWER EXTREMITY: Primary | ICD-10-CM

## 2021-02-18 PROCEDURE — 97110 THERAPEUTIC EXERCISES: CPT | Mod: PN

## 2021-02-23 ENCOUNTER — PATIENT MESSAGE (OUTPATIENT)
Dept: NEPHROLOGY | Facility: CLINIC | Age: 26
End: 2021-02-23

## 2021-02-23 DIAGNOSIS — N18.4 CKD (CHRONIC KIDNEY DISEASE) STAGE 4, GFR 15-29 ML/MIN: Primary | ICD-10-CM

## 2021-02-23 DIAGNOSIS — D50.9 IRON DEFICIENCY ANEMIA, UNSPECIFIED IRON DEFICIENCY ANEMIA TYPE: ICD-10-CM

## 2021-02-26 ENCOUNTER — LAB VISIT (OUTPATIENT)
Dept: LAB | Facility: HOSPITAL | Age: 26
End: 2021-02-26
Attending: INTERNAL MEDICINE
Payer: COMMERCIAL

## 2021-02-26 DIAGNOSIS — N18.4 CKD (CHRONIC KIDNEY DISEASE) STAGE 4, GFR 15-29 ML/MIN: ICD-10-CM

## 2021-02-26 DIAGNOSIS — D50.9 IRON DEFICIENCY ANEMIA, UNSPECIFIED IRON DEFICIENCY ANEMIA TYPE: ICD-10-CM

## 2021-02-26 LAB
25(OH)D3+25(OH)D2 SERPL-MCNC: 11 NG/ML (ref 30–96)
ALBUMIN SERPL BCP-MCNC: 1.8 G/DL (ref 3.5–5.2)
ANION GAP SERPL CALC-SCNC: 10 MMOL/L (ref 8–16)
BASOPHILS # BLD AUTO: 0.03 K/UL (ref 0–0.2)
BASOPHILS NFR BLD: 0.5 % (ref 0–1.9)
BUN SERPL-MCNC: 35 MG/DL (ref 6–20)
CALCIUM SERPL-MCNC: 8.1 MG/DL (ref 8.7–10.5)
CHLORIDE SERPL-SCNC: 109 MMOL/L (ref 95–110)
CO2 SERPL-SCNC: 20 MMOL/L (ref 23–29)
CREAT SERPL-MCNC: 5.1 MG/DL (ref 0.5–1.4)
DIFFERENTIAL METHOD: ABNORMAL
EOSINOPHIL # BLD AUTO: 0.2 K/UL (ref 0–0.5)
EOSINOPHIL NFR BLD: 3.2 % (ref 0–8)
ERYTHROCYTE [DISTWIDTH] IN BLOOD BY AUTOMATED COUNT: 13.8 % (ref 11.5–14.5)
EST. GFR  (AFRICAN AMERICAN): 12.5 ML/MIN/1.73 M^2
EST. GFR  (NON AFRICAN AMERICAN): 10.9 ML/MIN/1.73 M^2
FERRITIN SERPL-MCNC: 32 NG/ML (ref 20–300)
GLUCOSE SERPL-MCNC: 195 MG/DL (ref 70–110)
HCT VFR BLD AUTO: 29 % (ref 37–48.5)
HGB BLD-MCNC: 8.8 G/DL (ref 12–16)
IMM GRANULOCYTES # BLD AUTO: 0.02 K/UL (ref 0–0.04)
IMM GRANULOCYTES NFR BLD AUTO: 0.3 % (ref 0–0.5)
IRON SERPL-MCNC: 22 UG/DL (ref 30–160)
LYMPHOCYTES # BLD AUTO: 2.4 K/UL (ref 1–4.8)
LYMPHOCYTES NFR BLD: 36.9 % (ref 18–48)
MCH RBC QN AUTO: 28 PG (ref 27–31)
MCHC RBC AUTO-ENTMCNC: 30.3 G/DL (ref 32–36)
MCV RBC AUTO: 92 FL (ref 82–98)
MONOCYTES # BLD AUTO: 0.6 K/UL (ref 0.3–1)
MONOCYTES NFR BLD: 8.8 % (ref 4–15)
NEUTROPHILS # BLD AUTO: 3.3 K/UL (ref 1.8–7.7)
NEUTROPHILS NFR BLD: 50.3 % (ref 38–73)
NRBC BLD-RTO: 0 /100 WBC
PHOSPHATE SERPL-MCNC: 5.9 MG/DL (ref 2.7–4.5)
PLATELET # BLD AUTO: 415 K/UL (ref 150–350)
PMV BLD AUTO: 11.4 FL (ref 9.2–12.9)
POTASSIUM SERPL-SCNC: 4.4 MMOL/L (ref 3.5–5.1)
PTH-INTACT SERPL-MCNC: 487 PG/ML (ref 9–77)
RBC # BLD AUTO: 3.14 M/UL (ref 4–5.4)
SATURATED IRON: 10 % (ref 20–50)
SODIUM SERPL-SCNC: 139 MMOL/L (ref 136–145)
TOTAL IRON BINDING CAPACITY: 226 UG/DL (ref 250–450)
TRANSFERRIN SERPL-MCNC: 153 MG/DL (ref 200–375)
WBC # BLD AUTO: 6.62 K/UL (ref 3.9–12.7)

## 2021-02-26 PROCEDURE — 83970 ASSAY OF PARATHORMONE: CPT | Mod: NTX

## 2021-02-26 PROCEDURE — 82728 ASSAY OF FERRITIN: CPT | Mod: NTX

## 2021-02-26 PROCEDURE — 83540 ASSAY OF IRON: CPT | Mod: NTX

## 2021-02-26 PROCEDURE — 36415 COLL VENOUS BLD VENIPUNCTURE: CPT | Mod: NTX

## 2021-02-26 PROCEDURE — 85025 COMPLETE CBC W/AUTO DIFF WBC: CPT | Mod: NTX

## 2021-02-26 PROCEDURE — 82306 VITAMIN D 25 HYDROXY: CPT | Mod: NTX

## 2021-02-26 PROCEDURE — 80069 RENAL FUNCTION PANEL: CPT | Mod: TXP

## 2021-03-01 ENCOUNTER — TELEPHONE (OUTPATIENT)
Dept: NEPHROLOGY | Facility: HOSPITAL | Age: 26
End: 2021-03-01

## 2021-03-02 ENCOUNTER — DOCUMENTATION ONLY (OUTPATIENT)
Dept: REHABILITATION | Facility: OTHER | Age: 26
End: 2021-03-02

## 2021-03-03 ENCOUNTER — HOSPITAL ENCOUNTER (OUTPATIENT)
Facility: HOSPITAL | Age: 26
Discharge: HOME | End: 2021-03-05
Attending: EMERGENCY MEDICINE | Admitting: EMERGENCY MEDICINE
Payer: MEDICAID

## 2021-03-03 DIAGNOSIS — I16.1 HYPERTENSIVE EMERGENCY: ICD-10-CM

## 2021-03-03 DIAGNOSIS — E87.20 METABOLIC ACIDOSIS, NORMAL ANION GAP (NAG): ICD-10-CM

## 2021-03-03 DIAGNOSIS — R14.0 ABDOMINAL DISTENSION: ICD-10-CM

## 2021-03-03 DIAGNOSIS — D63.1 ANEMIA DUE TO STAGE 4 CHRONIC KIDNEY DISEASE: ICD-10-CM

## 2021-03-03 DIAGNOSIS — N18.4 TYPE 1 DIABETES MELLITUS WITH STAGE 4 CHRONIC KIDNEY DISEASE: ICD-10-CM

## 2021-03-03 DIAGNOSIS — N18.9 CKD (CHRONIC KIDNEY DISEASE): ICD-10-CM

## 2021-03-03 DIAGNOSIS — N18.4 ACUTE RENAL FAILURE SUPERIMPOSED ON STAGE 4 CHRONIC KIDNEY DISEASE, UNSPECIFIED ACUTE RENAL FAILURE TYPE: Primary | ICD-10-CM

## 2021-03-03 DIAGNOSIS — E55.9 VITAMIN D DEFICIENCY: ICD-10-CM

## 2021-03-03 DIAGNOSIS — N17.9 ACUTE RENAL FAILURE SUPERIMPOSED ON STAGE 4 CHRONIC KIDNEY DISEASE, UNSPECIFIED ACUTE RENAL FAILURE TYPE: Primary | ICD-10-CM

## 2021-03-03 DIAGNOSIS — E13.10 DIABETIC KETOACIDOSIS WITHOUT COMA ASSOCIATED WITH OTHER SPECIFIED DIABETES MELLITUS: ICD-10-CM

## 2021-03-03 DIAGNOSIS — R07.9 CHEST PAIN: ICD-10-CM

## 2021-03-03 DIAGNOSIS — R06.02 SOB (SHORTNESS OF BREATH): ICD-10-CM

## 2021-03-03 DIAGNOSIS — N25.81 SECONDARY HYPERPARATHYROIDISM: ICD-10-CM

## 2021-03-03 DIAGNOSIS — E10.65 HYPERGLYCEMIA DUE TO TYPE 1 DIABETES MELLITUS: ICD-10-CM

## 2021-03-03 DIAGNOSIS — I15.9 SECONDARY HYPERTENSION: ICD-10-CM

## 2021-03-03 DIAGNOSIS — E10.65 UNCONTROLLED TYPE 1 DIABETES MELLITUS WITH HYPERGLYCEMIA: ICD-10-CM

## 2021-03-03 DIAGNOSIS — E87.20 METABOLIC ACIDOSIS: ICD-10-CM

## 2021-03-03 DIAGNOSIS — N04.9 NEPHROTIC SYNDROME: ICD-10-CM

## 2021-03-03 DIAGNOSIS — N17.9 AKI (ACUTE KIDNEY INJURY): ICD-10-CM

## 2021-03-03 DIAGNOSIS — E10.22 TYPE 1 DIABETES MELLITUS WITH STAGE 4 CHRONIC KIDNEY DISEASE: ICD-10-CM

## 2021-03-03 DIAGNOSIS — R60.1 ANASARCA: ICD-10-CM

## 2021-03-03 DIAGNOSIS — F33.41 RECURRENT MAJOR DEPRESSIVE DISORDER, IN PARTIAL REMISSION: ICD-10-CM

## 2021-03-03 DIAGNOSIS — N18.4 ANEMIA DUE TO STAGE 4 CHRONIC KIDNEY DISEASE: ICD-10-CM

## 2021-03-03 DIAGNOSIS — D50.8 OTHER IRON DEFICIENCY ANEMIA: ICD-10-CM

## 2021-03-03 LAB
BASOPHILS # BLD AUTO: 0.04 K/UL (ref 0–0.2)
BASOPHILS NFR BLD: 0.5 % (ref 0–1.9)
DIFFERENTIAL METHOD: ABNORMAL
EOSINOPHIL # BLD AUTO: 0.1 K/UL (ref 0–0.5)
EOSINOPHIL NFR BLD: 1.7 % (ref 0–8)
ERYTHROCYTE [DISTWIDTH] IN BLOOD BY AUTOMATED COUNT: 13.5 % (ref 11.5–14.5)
HCT VFR BLD AUTO: 30.9 % (ref 37–48.5)
HGB BLD-MCNC: 9.6 G/DL (ref 12–16)
IMM GRANULOCYTES # BLD AUTO: 0.02 K/UL (ref 0–0.04)
IMM GRANULOCYTES NFR BLD AUTO: 0.3 % (ref 0–0.5)
LYMPHOCYTES # BLD AUTO: 2.4 K/UL (ref 1–4.8)
LYMPHOCYTES NFR BLD: 31.1 % (ref 18–48)
MCH RBC QN AUTO: 27.7 PG (ref 27–31)
MCHC RBC AUTO-ENTMCNC: 31.1 G/DL (ref 32–36)
MCV RBC AUTO: 89 FL (ref 82–98)
MONOCYTES # BLD AUTO: 0.5 K/UL (ref 0.3–1)
MONOCYTES NFR BLD: 6.8 % (ref 4–15)
NEUTROPHILS # BLD AUTO: 4.6 K/UL (ref 1.8–7.7)
NEUTROPHILS NFR BLD: 59.6 % (ref 38–73)
NRBC BLD-RTO: 0 /100 WBC
PLATELET # BLD AUTO: 428 K/UL (ref 150–350)
PMV BLD AUTO: 11.1 FL (ref 9.2–12.9)
RBC # BLD AUTO: 3.47 M/UL (ref 4–5.4)
WBC # BLD AUTO: 7.69 K/UL (ref 3.9–12.7)

## 2021-03-03 PROCEDURE — 82962 GLUCOSE BLOOD TEST: CPT | Mod: NTX

## 2021-03-03 PROCEDURE — 84484 ASSAY OF TROPONIN QUANT: CPT | Mod: NTX | Performed by: PHYSICIAN ASSISTANT

## 2021-03-03 PROCEDURE — 81025 URINE PREGNANCY TEST: CPT | Mod: NTX | Performed by: PHYSICIAN ASSISTANT

## 2021-03-03 PROCEDURE — 85025 COMPLETE CBC W/AUTO DIFF WBC: CPT | Mod: NTX | Performed by: PHYSICIAN ASSISTANT

## 2021-03-03 PROCEDURE — 86703 HIV-1/HIV-2 1 RESULT ANTBDY: CPT | Mod: NTX | Performed by: EMERGENCY MEDICINE

## 2021-03-03 PROCEDURE — 80053 COMPREHEN METABOLIC PANEL: CPT | Mod: NTX | Performed by: PHYSICIAN ASSISTANT

## 2021-03-03 PROCEDURE — 83880 ASSAY OF NATRIURETIC PEPTIDE: CPT | Mod: NTX | Performed by: PHYSICIAN ASSISTANT

## 2021-03-03 PROCEDURE — 99291 PR CRITICAL CARE, E/M 30-74 MINUTES: ICD-10-PCS | Mod: CS,,, | Performed by: PHYSICIAN ASSISTANT

## 2021-03-03 PROCEDURE — 86803 HEPATITIS C AB TEST: CPT | Mod: NTX | Performed by: EMERGENCY MEDICINE

## 2021-03-03 PROCEDURE — 99291 CRITICAL CARE FIRST HOUR: CPT | Mod: CS,,, | Performed by: PHYSICIAN ASSISTANT

## 2021-03-03 PROCEDURE — 99291 CRITICAL CARE FIRST HOUR: CPT | Mod: 25,NTX

## 2021-03-03 PROCEDURE — 83735 ASSAY OF MAGNESIUM: CPT | Mod: NTX | Performed by: PHYSICIAN ASSISTANT

## 2021-03-03 RX ORDER — LIDOCAINE 50 MG/G
1 PATCH TOPICAL
Status: COMPLETED | OUTPATIENT
Start: 2021-03-03 | End: 2021-03-04

## 2021-03-03 RX ORDER — ACETAMINOPHEN 500 MG
1000 TABLET ORAL
Status: COMPLETED | OUTPATIENT
Start: 2021-03-03 | End: 2021-03-04

## 2021-03-04 ENCOUNTER — TELEPHONE (OUTPATIENT)
Dept: TRANSPLANT | Facility: CLINIC | Age: 26
End: 2021-03-04

## 2021-03-04 PROBLEM — F32.A DEPRESSION: Status: ACTIVE | Noted: 2021-03-04

## 2021-03-04 PROBLEM — D63.1 ANEMIA DUE TO CHRONIC KIDNEY DISEASE: Status: ACTIVE | Noted: 2021-03-04

## 2021-03-04 PROBLEM — E11.10 DIABETIC ACIDOSIS WITHOUT COMA: Status: ACTIVE | Noted: 2021-03-04

## 2021-03-04 PROBLEM — N25.81 SECONDARY HYPERPARATHYROIDISM: Status: ACTIVE | Noted: 2021-03-04

## 2021-03-04 PROBLEM — R14.0 ABDOMINAL DISTENSION: Status: ACTIVE | Noted: 2021-03-04

## 2021-03-04 PROBLEM — E10.65 HYPERGLYCEMIA DUE TO TYPE 1 DIABETES MELLITUS: Status: ACTIVE | Noted: 2021-03-04

## 2021-03-04 PROBLEM — F33.41 RECURRENT MAJOR DEPRESSIVE DISORDER, IN PARTIAL REMISSION: Status: ACTIVE | Noted: 2021-03-04

## 2021-03-04 PROBLEM — R60.1 ANASARCA: Status: ACTIVE | Noted: 2021-03-04

## 2021-03-04 PROBLEM — N04.9 NEPHROTIC SYNDROME: Status: ACTIVE | Noted: 2021-03-04

## 2021-03-04 PROBLEM — N18.4 ACUTE RENAL FAILURE SUPERIMPOSED ON STAGE 4 CHRONIC KIDNEY DISEASE: Status: ACTIVE | Noted: 2021-03-04

## 2021-03-04 PROBLEM — I16.1 HYPERTENSIVE EMERGENCY: Status: ACTIVE | Noted: 2021-03-04

## 2021-03-04 PROBLEM — E87.20 METABOLIC ACIDOSIS: Status: ACTIVE | Noted: 2021-03-04

## 2021-03-04 PROBLEM — N18.9 ANEMIA DUE TO CHRONIC KIDNEY DISEASE: Status: ACTIVE | Noted: 2021-03-04

## 2021-03-04 PROBLEM — N17.9 ACUTE RENAL FAILURE SUPERIMPOSED ON STAGE 4 CHRONIC KIDNEY DISEASE: Status: ACTIVE | Noted: 2021-03-04

## 2021-03-04 LAB
ALBUMIN SERPL BCP-MCNC: 1.6 G/DL (ref 3.5–5.2)
ALBUMIN SERPL BCP-MCNC: 1.6 G/DL (ref 3.5–5.2)
ALBUMIN SERPL BCP-MCNC: 1.7 G/DL (ref 3.5–5.2)
ALBUMIN SERPL BCP-MCNC: 1.7 G/DL (ref 3.5–5.2)
ALBUMIN SERPL BCP-MCNC: 2 G/DL (ref 3.5–5.2)
ALLENS TEST: ABNORMAL
ALP SERPL-CCNC: 97 U/L (ref 55–135)
ALT SERPL W/O P-5'-P-CCNC: 17 U/L (ref 10–44)
ANION GAP SERPL CALC-SCNC: 10 MMOL/L (ref 8–16)
ANION GAP SERPL CALC-SCNC: 12 MMOL/L (ref 8–16)
ANION GAP SERPL CALC-SCNC: 13 MMOL/L (ref 8–16)
ANION GAP SERPL CALC-SCNC: 8 MMOL/L (ref 8–16)
AST SERPL-CCNC: 17 U/L (ref 10–40)
B-HCG UR QL: NEGATIVE
B-OH-BUTYR BLD STRIP-SCNC: 1.6 MMOL/L (ref 0–0.5)
BACTERIA #/AREA URNS AUTO: ABNORMAL /HPF
BILIRUB SERPL-MCNC: 0.1 MG/DL (ref 0.1–1)
BILIRUB UR QL STRIP: NEGATIVE
BNP SERPL-MCNC: 254 PG/ML (ref 0–99)
BUN SERPL-MCNC: 38 MG/DL (ref 6–20)
BUN SERPL-MCNC: 39 MG/DL (ref 6–20)
BUN SERPL-MCNC: 40 MG/DL (ref 6–20)
BUN SERPL-MCNC: 41 MG/DL (ref 6–20)
CALCIUM SERPL-MCNC: 8.2 MG/DL (ref 8.7–10.5)
CALCIUM SERPL-MCNC: 8.6 MG/DL (ref 8.7–10.5)
CALCIUM SERPL-MCNC: 8.6 MG/DL (ref 8.7–10.5)
CALCIUM SERPL-MCNC: 8.7 MG/DL (ref 8.7–10.5)
CALCIUM SERPL-MCNC: 8.7 MG/DL (ref 8.7–10.5)
CALCIUM SERPL-MCNC: 8.8 MG/DL (ref 8.7–10.5)
CHLORIDE SERPL-SCNC: 105 MMOL/L (ref 95–110)
CHLORIDE SERPL-SCNC: 106 MMOL/L (ref 95–110)
CHLORIDE SERPL-SCNC: 108 MMOL/L (ref 95–110)
CHLORIDE SERPL-SCNC: 109 MMOL/L (ref 95–110)
CLARITY UR REFRACT.AUTO: ABNORMAL
CO2 SERPL-SCNC: 17 MMOL/L (ref 23–29)
CO2 SERPL-SCNC: 19 MMOL/L (ref 23–29)
CO2 SERPL-SCNC: 20 MMOL/L (ref 23–29)
CO2 SERPL-SCNC: 21 MMOL/L (ref 23–29)
CO2 SERPL-SCNC: 21 MMOL/L (ref 23–29)
CO2 SERPL-SCNC: 23 MMOL/L (ref 23–29)
COLOR UR AUTO: ABNORMAL
CREAT SERPL-MCNC: 5 MG/DL (ref 0.5–1.4)
CREAT SERPL-MCNC: 5.1 MG/DL (ref 0.5–1.4)
CREAT SERPL-MCNC: 5.2 MG/DL (ref 0.5–1.4)
CREAT SERPL-MCNC: 5.3 MG/DL (ref 0.5–1.4)
CREAT SERPL-MCNC: 5.6 MG/DL (ref 0.5–1.4)
CREAT SERPL-MCNC: 5.6 MG/DL (ref 0.5–1.4)
CTP QC/QA: YES
CTP QC/QA: YES
EST. GFR  (AFRICAN AMERICAN): 11.2 ML/MIN/1.73 M^2
EST. GFR  (AFRICAN AMERICAN): 11.2 ML/MIN/1.73 M^2
EST. GFR  (AFRICAN AMERICAN): 12 ML/MIN/1.73 M^2
EST. GFR  (AFRICAN AMERICAN): 12.2 ML/MIN/1.73 M^2
EST. GFR  (AFRICAN AMERICAN): 12.5 ML/MIN/1.73 M^2
EST. GFR  (AFRICAN AMERICAN): 12.8 ML/MIN/1.73 M^2
EST. GFR  (NON AFRICAN AMERICAN): 10.4 ML/MIN/1.73 M^2
EST. GFR  (NON AFRICAN AMERICAN): 10.6 ML/MIN/1.73 M^2
EST. GFR  (NON AFRICAN AMERICAN): 10.9 ML/MIN/1.73 M^2
EST. GFR  (NON AFRICAN AMERICAN): 11.1 ML/MIN/1.73 M^2
EST. GFR  (NON AFRICAN AMERICAN): 9.7 ML/MIN/1.73 M^2
EST. GFR  (NON AFRICAN AMERICAN): 9.7 ML/MIN/1.73 M^2
ESTIMATED AVG GLUCOSE: 246 MG/DL (ref 68–131)
GLUCOSE SERPL-MCNC: 437 MG/DL (ref 70–110)
GLUCOSE SERPL-MCNC: 529 MG/DL (ref 70–110)
GLUCOSE SERPL-MCNC: 62 MG/DL (ref 70–110)
GLUCOSE SERPL-MCNC: 62 MG/DL (ref 70–110)
GLUCOSE SERPL-MCNC: 70 MG/DL (ref 70–110)
GLUCOSE SERPL-MCNC: 94 MG/DL (ref 70–110)
GLUCOSE UR QL STRIP: ABNORMAL
HBA1C MFR BLD: 10.2 % (ref 4–5.6)
HCO3 UR-SCNC: 23.4 MMOL/L (ref 24–28)
HGB UR QL STRIP: NEGATIVE
HYALINE CASTS UR QL AUTO: 3 /LPF
KETONES UR QL STRIP: ABNORMAL
LEUKOCYTE ESTERASE UR QL STRIP: ABNORMAL
MAGNESIUM SERPL-MCNC: 2.1 MG/DL (ref 1.6–2.6)
MAGNESIUM SERPL-MCNC: 2.1 MG/DL (ref 1.6–2.6)
MICROSCOPIC COMMENT: ABNORMAL
NITRITE UR QL STRIP: NEGATIVE
PCO2 BLDA: 44.3 MMHG (ref 35–45)
PH SMN: 7.33 [PH] (ref 7.35–7.45)
PH UR STRIP: 7 [PH] (ref 5–8)
PHOSPHATE SERPL-MCNC: 5.3 MG/DL (ref 2.7–4.5)
PHOSPHATE SERPL-MCNC: 5.6 MG/DL (ref 2.7–4.5)
PHOSPHATE SERPL-MCNC: 5.6 MG/DL (ref 2.7–4.5)
PHOSPHATE SERPL-MCNC: 5.8 MG/DL (ref 2.7–4.5)
PHOSPHATE SERPL-MCNC: 5.8 MG/DL (ref 2.7–4.5)
PO2 BLDA: 34 MMHG (ref 40–60)
POC BE: -3 MMOL/L
POC SATURATED O2: 62 % (ref 95–100)
POC TCO2: 25 MMOL/L (ref 24–29)
POCT GLUCOSE: 103 MG/DL (ref 70–110)
POCT GLUCOSE: 146 MG/DL (ref 70–110)
POCT GLUCOSE: 338 MG/DL (ref 70–110)
POCT GLUCOSE: 427 MG/DL (ref 70–110)
POCT GLUCOSE: 443 MG/DL (ref 70–110)
POCT GLUCOSE: 496 MG/DL (ref 70–110)
POCT GLUCOSE: 76 MG/DL (ref 70–110)
POCT GLUCOSE: 84 MG/DL (ref 70–110)
POCT GLUCOSE: 92 MG/DL (ref 70–110)
POTASSIUM SERPL-SCNC: 4.2 MMOL/L (ref 3.5–5.1)
POTASSIUM SERPL-SCNC: 4.3 MMOL/L (ref 3.5–5.1)
POTASSIUM SERPL-SCNC: 4.4 MMOL/L (ref 3.5–5.1)
POTASSIUM SERPL-SCNC: 4.4 MMOL/L (ref 3.5–5.1)
POTASSIUM SERPL-SCNC: 4.5 MMOL/L (ref 3.5–5.1)
POTASSIUM SERPL-SCNC: 4.8 MMOL/L (ref 3.5–5.1)
PROT SERPL-MCNC: 6.5 G/DL (ref 6–8.4)
PROT UR QL STRIP: ABNORMAL
RBC #/AREA URNS AUTO: 4 /HPF (ref 0–4)
SAMPLE: ABNORMAL
SARS-COV-2 RDRP RESP QL NAA+PROBE: NEGATIVE
SITE: ABNORMAL
SODIUM SERPL-SCNC: 135 MMOL/L (ref 136–145)
SODIUM SERPL-SCNC: 137 MMOL/L (ref 136–145)
SODIUM SERPL-SCNC: 139 MMOL/L (ref 136–145)
SODIUM SERPL-SCNC: 139 MMOL/L (ref 136–145)
SODIUM SERPL-SCNC: 140 MMOL/L (ref 136–145)
SODIUM SERPL-SCNC: 140 MMOL/L (ref 136–145)
SP GR UR STRIP: 1.01 (ref 1–1.03)
SQUAMOUS #/AREA URNS AUTO: 1 /HPF
TROPONIN I SERPL DL<=0.01 NG/ML-MCNC: 0.02 NG/ML (ref 0–0.03)
URN SPEC COLLECT METH UR: ABNORMAL
WBC #/AREA URNS AUTO: 42 /HPF (ref 0–5)
YEAST UR QL AUTO: ABNORMAL

## 2021-03-04 PROCEDURE — G0378 HOSPITAL OBSERVATION PER HR: HCPCS | Mod: NTX

## 2021-03-04 PROCEDURE — 83036 HEMOGLOBIN GLYCOSYLATED A1C: CPT | Mod: NTX | Performed by: PHYSICIAN ASSISTANT

## 2021-03-04 PROCEDURE — 99220 PR INITIAL OBSERVATION CARE,LEVL III: CPT | Mod: NTX,,, | Performed by: INTERNAL MEDICINE

## 2021-03-04 PROCEDURE — 80048 BASIC METABOLIC PNL TOTAL CA: CPT | Mod: NTX | Performed by: PHYSICIAN ASSISTANT

## 2021-03-04 PROCEDURE — 81001 URINALYSIS AUTO W/SCOPE: CPT | Mod: NTX | Performed by: PHYSICIAN ASSISTANT

## 2021-03-04 PROCEDURE — 96375 TX/PRO/DX INJ NEW DRUG ADDON: CPT | Mod: NTX

## 2021-03-04 PROCEDURE — 63600175 PHARM REV CODE 636 W HCPCS: Mod: NTX | Performed by: PHYSICIAN ASSISTANT

## 2021-03-04 PROCEDURE — 25000003 PHARM REV CODE 250: Mod: NTX | Performed by: PHYSICIAN ASSISTANT

## 2021-03-04 PROCEDURE — 96365 THER/PROPH/DIAG IV INF INIT: CPT | Mod: NTX

## 2021-03-04 PROCEDURE — 96374 THER/PROPH/DIAG INJ IV PUSH: CPT | Mod: 59,NTX

## 2021-03-04 PROCEDURE — 99215 OFFICE O/P EST HI 40 MIN: CPT | Mod: NTX,,, | Performed by: INTERNAL MEDICINE

## 2021-03-04 PROCEDURE — 84100 ASSAY OF PHOSPHORUS: CPT | Mod: NTX | Performed by: PHYSICIAN ASSISTANT

## 2021-03-04 PROCEDURE — 96366 THER/PROPH/DIAG IV INF ADDON: CPT | Mod: NTX

## 2021-03-04 PROCEDURE — 93010 EKG 12-LEAD: ICD-10-PCS | Mod: NTX,,, | Performed by: INTERNAL MEDICINE

## 2021-03-04 PROCEDURE — C9399 UNCLASSIFIED DRUGS OR BIOLOG: HCPCS | Mod: NTX | Performed by: STUDENT IN AN ORGANIZED HEALTH CARE EDUCATION/TRAINING PROGRAM

## 2021-03-04 PROCEDURE — 80069 RENAL FUNCTION PANEL: CPT | Mod: 91,NTX | Performed by: STUDENT IN AN ORGANIZED HEALTH CARE EDUCATION/TRAINING PROGRAM

## 2021-03-04 PROCEDURE — 93010 ELECTROCARDIOGRAM REPORT: CPT | Mod: NTX,,, | Performed by: INTERNAL MEDICINE

## 2021-03-04 PROCEDURE — 93005 ELECTROCARDIOGRAM TRACING: CPT | Mod: NTX

## 2021-03-04 PROCEDURE — 83735 ASSAY OF MAGNESIUM: CPT | Mod: NTX | Performed by: PHYSICIAN ASSISTANT

## 2021-03-04 PROCEDURE — 82010 KETONE BODYS QUAN: CPT | Mod: NTX | Performed by: PHYSICIAN ASSISTANT

## 2021-03-04 PROCEDURE — 87086 URINE CULTURE/COLONY COUNT: CPT | Mod: NTX | Performed by: PHYSICIAN ASSISTANT

## 2021-03-04 PROCEDURE — U0002 COVID-19 LAB TEST NON-CDC: HCPCS | Mod: NTX | Performed by: PHYSICIAN ASSISTANT

## 2021-03-04 PROCEDURE — 96376 TX/PRO/DX INJ SAME DRUG ADON: CPT | Mod: NTX

## 2021-03-04 PROCEDURE — 36415 COLL VENOUS BLD VENIPUNCTURE: CPT | Mod: NTX | Performed by: STUDENT IN AN ORGANIZED HEALTH CARE EDUCATION/TRAINING PROGRAM

## 2021-03-04 PROCEDURE — 82800 BLOOD PH: CPT | Mod: NTX,59

## 2021-03-04 PROCEDURE — 99215 PR OFFICE/OUTPT VISIT, EST, LEVL V, 40-54 MIN: ICD-10-PCS | Mod: NTX,,, | Performed by: INTERNAL MEDICINE

## 2021-03-04 PROCEDURE — 25000003 PHARM REV CODE 250: Mod: NTX | Performed by: STUDENT IN AN ORGANIZED HEALTH CARE EDUCATION/TRAINING PROGRAM

## 2021-03-04 PROCEDURE — 99900035 HC TECH TIME PER 15 MIN (STAT): Mod: NTX

## 2021-03-04 PROCEDURE — 63600175 PHARM REV CODE 636 W HCPCS: Mod: NTX | Performed by: STUDENT IN AN ORGANIZED HEALTH CARE EDUCATION/TRAINING PROGRAM

## 2021-03-04 PROCEDURE — 99220 PR INITIAL OBSERVATION CARE,LEVL III: ICD-10-PCS | Mod: NTX,,, | Performed by: INTERNAL MEDICINE

## 2021-03-04 PROCEDURE — 96372 THER/PROPH/DIAG INJ SC/IM: CPT | Mod: 59,NTX

## 2021-03-04 PROCEDURE — 82803 BLOOD GASES ANY COMBINATION: CPT | Mod: NTX

## 2021-03-04 RX ORDER — INSULIN ASPART 100 [IU]/ML
0-5 INJECTION, SOLUTION INTRAVENOUS; SUBCUTANEOUS
Status: DISCONTINUED | OUTPATIENT
Start: 2021-03-04 | End: 2021-03-05 | Stop reason: HOSPADM

## 2021-03-04 RX ORDER — SEVELAMER CARBONATE 800 MG/1
800 TABLET, FILM COATED ORAL
Status: DISCONTINUED | OUTPATIENT
Start: 2021-03-04 | End: 2021-03-05 | Stop reason: HOSPADM

## 2021-03-04 RX ORDER — FERROUS SULFATE 325(65) MG
325 TABLET, DELAYED RELEASE (ENTERIC COATED) ORAL 2 TIMES DAILY
Status: DISCONTINUED | OUTPATIENT
Start: 2021-03-04 | End: 2021-03-05 | Stop reason: HOSPADM

## 2021-03-04 RX ORDER — CARVEDILOL 12.5 MG/1
25 TABLET ORAL
Status: COMPLETED | OUTPATIENT
Start: 2021-03-04 | End: 2021-03-04

## 2021-03-04 RX ORDER — LOSARTAN POTASSIUM 50 MG/1
50 TABLET ORAL DAILY
Status: DISCONTINUED | OUTPATIENT
Start: 2021-03-04 | End: 2021-03-04

## 2021-03-04 RX ORDER — FUROSEMIDE 10 MG/ML
80 INJECTION INTRAMUSCULAR; INTRAVENOUS
Status: DISCONTINUED | OUTPATIENT
Start: 2021-03-04 | End: 2021-03-04

## 2021-03-04 RX ORDER — IBUPROFEN 200 MG
24 TABLET ORAL
Status: DISCONTINUED | OUTPATIENT
Start: 2021-03-04 | End: 2021-03-05 | Stop reason: HOSPADM

## 2021-03-04 RX ORDER — SODIUM BICARBONATE 650 MG/1
650 TABLET ORAL 2 TIMES DAILY
Status: DISCONTINUED | OUTPATIENT
Start: 2021-03-04 | End: 2021-03-05 | Stop reason: HOSPADM

## 2021-03-04 RX ORDER — ESCITALOPRAM OXALATE 20 MG/1
20 TABLET ORAL DAILY
Status: DISCONTINUED | OUTPATIENT
Start: 2021-03-04 | End: 2021-03-05 | Stop reason: HOSPADM

## 2021-03-04 RX ORDER — IBUPROFEN 200 MG
16 TABLET ORAL
Status: DISCONTINUED | OUTPATIENT
Start: 2021-03-04 | End: 2021-03-05 | Stop reason: HOSPADM

## 2021-03-04 RX ORDER — NIFEDIPINE 30 MG/1
60 TABLET, EXTENDED RELEASE ORAL DAILY
Status: DISCONTINUED | OUTPATIENT
Start: 2021-03-04 | End: 2021-03-05

## 2021-03-04 RX ORDER — HYDRALAZINE HYDROCHLORIDE 20 MG/ML
10 INJECTION INTRAMUSCULAR; INTRAVENOUS
Status: COMPLETED | OUTPATIENT
Start: 2021-03-04 | End: 2021-03-04

## 2021-03-04 RX ORDER — HYDRALAZINE HYDROCHLORIDE 50 MG/1
50 TABLET, FILM COATED ORAL EVERY 8 HOURS PRN
Status: DISCONTINUED | OUTPATIENT
Start: 2021-03-04 | End: 2021-03-05

## 2021-03-04 RX ORDER — INSULIN ASPART 100 [IU]/ML
5 INJECTION, SOLUTION INTRAVENOUS; SUBCUTANEOUS
Status: DISCONTINUED | OUTPATIENT
Start: 2021-03-04 | End: 2021-03-05 | Stop reason: HOSPADM

## 2021-03-04 RX ORDER — GLUCAGON 1 MG
1 KIT INJECTION
Status: DISCONTINUED | OUTPATIENT
Start: 2021-03-04 | End: 2021-03-05 | Stop reason: HOSPADM

## 2021-03-04 RX ORDER — CARVEDILOL 25 MG/1
25 TABLET ORAL 2 TIMES DAILY
Status: DISCONTINUED | OUTPATIENT
Start: 2021-03-04 | End: 2021-03-05

## 2021-03-04 RX ORDER — SODIUM CHLORIDE 0.9 % (FLUSH) 0.9 %
10 SYRINGE (ML) INJECTION
Status: DISCONTINUED | OUTPATIENT
Start: 2021-03-04 | End: 2021-03-05 | Stop reason: HOSPADM

## 2021-03-04 RX ORDER — FUROSEMIDE 80 MG/1
80 TABLET ORAL 2 TIMES DAILY
Status: DISCONTINUED | OUTPATIENT
Start: 2021-03-04 | End: 2021-03-04

## 2021-03-04 RX ORDER — ASPIRIN 81 MG/1
81 TABLET ORAL NIGHTLY
Status: DISCONTINUED | OUTPATIENT
Start: 2021-03-04 | End: 2021-03-05 | Stop reason: HOSPADM

## 2021-03-04 RX ORDER — FUROSEMIDE 10 MG/ML
80 INJECTION INTRAMUSCULAR; INTRAVENOUS EVERY 8 HOURS
Status: DISCONTINUED | OUTPATIENT
Start: 2021-03-04 | End: 2021-03-05

## 2021-03-04 RX ORDER — PROMETHAZINE HYDROCHLORIDE 12.5 MG/1
12.5 TABLET ORAL EVERY 6 HOURS PRN
Status: DISCONTINUED | OUTPATIENT
Start: 2021-03-04 | End: 2021-03-05 | Stop reason: HOSPADM

## 2021-03-04 RX ADMIN — ACETAMINOPHEN 1000 MG: 500 TABLET ORAL at 01:03

## 2021-03-04 RX ADMIN — ASPIRIN 81 MG: 81 TABLET, COATED ORAL at 09:03

## 2021-03-04 RX ADMIN — LIDOCAINE 1 PATCH: 50 PATCH TOPICAL at 01:03

## 2021-03-04 RX ADMIN — INSULIN HUMAN 5 UNITS: 100 INJECTION, SOLUTION PARENTERAL at 02:03

## 2021-03-04 RX ADMIN — INSULIN DETEMIR 8 UNITS: 100 INJECTION, SOLUTION SUBCUTANEOUS at 08:03

## 2021-03-04 RX ADMIN — CARVEDILOL 25 MG: 25 TABLET, FILM COATED ORAL at 09:03

## 2021-03-04 RX ADMIN — CARVEDILOL 25 MG: 12.5 TABLET, FILM COATED ORAL at 02:03

## 2021-03-04 RX ADMIN — HYDRALAZINE HYDROCHLORIDE 10 MG: 20 INJECTION, SOLUTION INTRAMUSCULAR; INTRAVENOUS at 03:03

## 2021-03-04 RX ADMIN — PROMETHAZINE HYDROCHLORIDE 12.5 MG: 12.5 TABLET ORAL at 11:03

## 2021-03-04 RX ADMIN — ESCITALOPRAM OXALATE 20 MG: 20 TABLET ORAL at 08:03

## 2021-03-04 RX ADMIN — FUROSEMIDE 80 MG: 10 INJECTION, SOLUTION INTRAMUSCULAR; INTRAVENOUS at 05:03

## 2021-03-04 RX ADMIN — FERROUS SULFATE TAB EC 325 MG (65 MG FE EQUIVALENT) 325 MG: 325 (65 FE) TABLET DELAYED RESPONSE at 08:03

## 2021-03-04 RX ADMIN — NIFEDIPINE 60 MG: 30 TABLET, FILM COATED, EXTENDED RELEASE ORAL at 11:03

## 2021-03-04 RX ADMIN — SODIUM BICARBONATE 650 MG TABLET 650 MG: at 09:03

## 2021-03-04 RX ADMIN — SODIUM BICARBONATE 650 MG TABLET 650 MG: at 08:03

## 2021-03-04 RX ADMIN — CARVEDILOL 25 MG: 25 TABLET, FILM COATED ORAL at 08:03

## 2021-03-04 RX ADMIN — FUROSEMIDE 80 MG: 80 TABLET ORAL at 08:03

## 2021-03-04 RX ADMIN — SODIUM CHLORIDE 5 UNITS/HR: 9 INJECTION, SOLUTION INTRAVENOUS at 03:03

## 2021-03-04 RX ADMIN — FUROSEMIDE 80 MG: 10 INJECTION, SOLUTION INTRAMUSCULAR; INTRAVENOUS at 11:03

## 2021-03-04 RX ADMIN — SEVELAMER CARBONATE 800 MG: 800 TABLET, FILM COATED ORAL at 08:03

## 2021-03-04 RX ADMIN — FERROUS SULFATE TAB EC 325 MG (65 MG FE EQUIVALENT) 325 MG: 325 (65 FE) TABLET DELAYED RESPONSE at 09:03

## 2021-03-04 RX ADMIN — INSULIN DETEMIR 9 UNITS: 100 INJECTION, SOLUTION SUBCUTANEOUS at 09:03

## 2021-03-05 ENCOUNTER — PATIENT MESSAGE (OUTPATIENT)
Dept: REHABILITATION | Facility: OTHER | Age: 26
End: 2021-03-05

## 2021-03-05 VITALS
HEART RATE: 88 BPM | DIASTOLIC BLOOD PRESSURE: 86 MMHG | HEIGHT: 64 IN | TEMPERATURE: 98 F | SYSTOLIC BLOOD PRESSURE: 138 MMHG | RESPIRATION RATE: 17 BRPM | WEIGHT: 150 LBS | OXYGEN SATURATION: 98 % | BODY MASS INDEX: 25.61 KG/M2

## 2021-03-05 PROBLEM — E88.09 HYPOALBUMINEMIA: Status: ACTIVE | Noted: 2021-03-05

## 2021-03-05 PROBLEM — R19.7 DIARRHEA IN ADULT PATIENT: Status: ACTIVE | Noted: 2021-03-05

## 2021-03-05 LAB
ALBUMIN SERPL BCP-MCNC: 1.4 G/DL (ref 3.5–5.2)
ALBUMIN SERPL BCP-MCNC: 1.5 G/DL (ref 3.5–5.2)
ALBUMIN SERPL BCP-MCNC: 1.6 G/DL (ref 3.5–5.2)
ALBUMIN SERPL BCP-MCNC: 1.6 G/DL (ref 3.5–5.2)
ANION GAP SERPL CALC-SCNC: 10 MMOL/L (ref 8–16)
ANION GAP SERPL CALC-SCNC: 13 MMOL/L (ref 8–16)
BACTERIA UR CULT: NO GROWTH
BASOPHILS # BLD AUTO: 0.03 K/UL (ref 0–0.2)
BASOPHILS NFR BLD: 0.5 % (ref 0–1.9)
BUN SERPL-MCNC: 39 MG/DL (ref 6–20)
BUN SERPL-MCNC: 40 MG/DL (ref 6–20)
BUN SERPL-MCNC: 40 MG/DL (ref 6–20)
BUN SERPL-MCNC: 41 MG/DL (ref 6–20)
C DIFF GDH STL QL: NEGATIVE
C DIFF TOX A+B STL QL IA: NEGATIVE
CALCIUM SERPL-MCNC: 8.3 MG/DL (ref 8.7–10.5)
CALCIUM SERPL-MCNC: 8.3 MG/DL (ref 8.7–10.5)
CALCIUM SERPL-MCNC: 8.4 MG/DL (ref 8.7–10.5)
CALCIUM SERPL-MCNC: 8.6 MG/DL (ref 8.7–10.5)
CHLORIDE SERPL-SCNC: 108 MMOL/L (ref 95–110)
CHLORIDE SERPL-SCNC: 108 MMOL/L (ref 95–110)
CHLORIDE SERPL-SCNC: 109 MMOL/L (ref 95–110)
CHLORIDE SERPL-SCNC: 110 MMOL/L (ref 95–110)
CO2 SERPL-SCNC: 18 MMOL/L (ref 23–29)
CO2 SERPL-SCNC: 20 MMOL/L (ref 23–29)
CO2 SERPL-SCNC: 21 MMOL/L (ref 23–29)
CO2 SERPL-SCNC: 21 MMOL/L (ref 23–29)
CREAT SERPL-MCNC: 5.2 MG/DL (ref 0.5–1.4)
CREAT SERPL-MCNC: 5.4 MG/DL (ref 0.5–1.4)
CREAT UR-MCNC: 38 MG/DL (ref 15–325)
DIFFERENTIAL METHOD: ABNORMAL
EOSINOPHIL # BLD AUTO: 0.1 K/UL (ref 0–0.5)
EOSINOPHIL NFR BLD: 1.8 % (ref 0–8)
ERYTHROCYTE [DISTWIDTH] IN BLOOD BY AUTOMATED COUNT: 13.6 % (ref 11.5–14.5)
EST. GFR  (AFRICAN AMERICAN): 11.7 ML/MIN/1.73 M^2
EST. GFR  (AFRICAN AMERICAN): 12.2 ML/MIN/1.73 M^2
EST. GFR  (NON AFRICAN AMERICAN): 10.1 ML/MIN/1.73 M^2
EST. GFR  (NON AFRICAN AMERICAN): 10.6 ML/MIN/1.73 M^2
GLUCOSE SERPL-MCNC: 105 MG/DL (ref 70–110)
GLUCOSE SERPL-MCNC: 72 MG/DL (ref 70–110)
GLUCOSE SERPL-MCNC: 72 MG/DL (ref 70–110)
GLUCOSE SERPL-MCNC: 89 MG/DL (ref 70–110)
HCT VFR BLD AUTO: 27.5 % (ref 37–48.5)
HCV AB SERPL QL IA: NEGATIVE
HGB BLD-MCNC: 8.6 G/DL (ref 12–16)
HIV 1+2 AB+HIV1 P24 AG SERPL QL IA: NEGATIVE
IMM GRANULOCYTES # BLD AUTO: 0.01 K/UL (ref 0–0.04)
IMM GRANULOCYTES NFR BLD AUTO: 0.2 % (ref 0–0.5)
LYMPHOCYTES # BLD AUTO: 2.2 K/UL (ref 1–4.8)
LYMPHOCYTES NFR BLD: 39 % (ref 18–48)
MAGNESIUM SERPL-MCNC: 2.1 MG/DL (ref 1.6–2.6)
MCH RBC QN AUTO: 28.1 PG (ref 27–31)
MCHC RBC AUTO-ENTMCNC: 31.3 G/DL (ref 32–36)
MCV RBC AUTO: 90 FL (ref 82–98)
MONOCYTES # BLD AUTO: 0.5 K/UL (ref 0.3–1)
MONOCYTES NFR BLD: 8.5 % (ref 4–15)
NEUTROPHILS # BLD AUTO: 2.8 K/UL (ref 1.8–7.7)
NEUTROPHILS NFR BLD: 50 % (ref 38–73)
NRBC BLD-RTO: 0 /100 WBC
PHOSPHATE SERPL-MCNC: 5.9 MG/DL (ref 2.7–4.5)
PHOSPHATE SERPL-MCNC: 6 MG/DL (ref 2.7–4.5)
PHOSPHATE SERPL-MCNC: 6.1 MG/DL (ref 2.7–4.5)
PHOSPHATE SERPL-MCNC: 6.3 MG/DL (ref 2.7–4.5)
PLATELET # BLD AUTO: 380 K/UL (ref 150–350)
PMV BLD AUTO: 11.4 FL (ref 9.2–12.9)
POCT GLUCOSE: 108 MG/DL (ref 70–110)
POCT GLUCOSE: 120 MG/DL (ref 70–110)
POCT GLUCOSE: 122 MG/DL (ref 70–110)
POCT GLUCOSE: 140 MG/DL (ref 70–110)
POCT GLUCOSE: 65 MG/DL (ref 70–110)
POTASSIUM SERPL-SCNC: 4 MMOL/L (ref 3.5–5.1)
POTASSIUM SERPL-SCNC: 4.1 MMOL/L (ref 3.5–5.1)
POTASSIUM SERPL-SCNC: 4.2 MMOL/L (ref 3.5–5.1)
POTASSIUM SERPL-SCNC: 4.2 MMOL/L (ref 3.5–5.1)
PROT UR-MCNC: 411 MG/DL (ref 0–15)
PROT/CREAT UR: 10.82 MG/G{CREAT} (ref 0–0.2)
RBC # BLD AUTO: 3.06 M/UL (ref 4–5.4)
SODIUM SERPL-SCNC: 139 MMOL/L (ref 136–145)
SODIUM SERPL-SCNC: 141 MMOL/L (ref 136–145)
UUN UR-MCNC: 119 MG/DL (ref 140–1050)
WBC # BLD AUTO: 5.67 K/UL (ref 3.9–12.7)
WBC #/AREA STL HPF: NORMAL /[HPF]

## 2021-03-05 PROCEDURE — 87449 NOS EACH ORGANISM AG IA: CPT | Mod: NTX | Performed by: STUDENT IN AN ORGANIZED HEALTH CARE EDUCATION/TRAINING PROGRAM

## 2021-03-05 PROCEDURE — 85025 COMPLETE CBC W/AUTO DIFF WBC: CPT | Mod: NTX | Performed by: STUDENT IN AN ORGANIZED HEALTH CARE EDUCATION/TRAINING PROGRAM

## 2021-03-05 PROCEDURE — 89055 LEUKOCYTE ASSESSMENT FECAL: CPT | Mod: NTX | Performed by: STUDENT IN AN ORGANIZED HEALTH CARE EDUCATION/TRAINING PROGRAM

## 2021-03-05 PROCEDURE — 84540 ASSAY OF URINE/UREA-N: CPT | Mod: NTX | Performed by: STUDENT IN AN ORGANIZED HEALTH CARE EDUCATION/TRAINING PROGRAM

## 2021-03-05 PROCEDURE — 25000003 PHARM REV CODE 250: Mod: NTX | Performed by: STUDENT IN AN ORGANIZED HEALTH CARE EDUCATION/TRAINING PROGRAM

## 2021-03-05 PROCEDURE — 84156 ASSAY OF PROTEIN URINE: CPT | Mod: NTX | Performed by: STUDENT IN AN ORGANIZED HEALTH CARE EDUCATION/TRAINING PROGRAM

## 2021-03-05 PROCEDURE — 99214 PR OFFICE/OUTPT VISIT, EST, LEVL IV, 30-39 MIN: ICD-10-PCS | Mod: NTX,,, | Performed by: INTERNAL MEDICINE

## 2021-03-05 PROCEDURE — 87045 FECES CULTURE AEROBIC BACT: CPT | Mod: NTX | Performed by: STUDENT IN AN ORGANIZED HEALTH CARE EDUCATION/TRAINING PROGRAM

## 2021-03-05 PROCEDURE — 87046 STOOL CULTR AEROBIC BACT EA: CPT | Mod: NTX | Performed by: STUDENT IN AN ORGANIZED HEALTH CARE EDUCATION/TRAINING PROGRAM

## 2021-03-05 PROCEDURE — 96372 THER/PROPH/DIAG INJ SC/IM: CPT | Mod: 59

## 2021-03-05 PROCEDURE — 87427 SHIGA-LIKE TOXIN AG IA: CPT | Mod: 59,NTX | Performed by: STUDENT IN AN ORGANIZED HEALTH CARE EDUCATION/TRAINING PROGRAM

## 2021-03-05 PROCEDURE — 80069 RENAL FUNCTION PANEL: CPT | Mod: 91,NTX | Performed by: STUDENT IN AN ORGANIZED HEALTH CARE EDUCATION/TRAINING PROGRAM

## 2021-03-05 PROCEDURE — 99214 OFFICE O/P EST MOD 30 MIN: CPT | Mod: NTX,,, | Performed by: INTERNAL MEDICINE

## 2021-03-05 PROCEDURE — 36415 COLL VENOUS BLD VENIPUNCTURE: CPT | Mod: NTX | Performed by: STUDENT IN AN ORGANIZED HEALTH CARE EDUCATION/TRAINING PROGRAM

## 2021-03-05 PROCEDURE — G0378 HOSPITAL OBSERVATION PER HR: HCPCS | Mod: NTX

## 2021-03-05 PROCEDURE — 25000003 PHARM REV CODE 250: Mod: NTX | Performed by: INTERNAL MEDICINE

## 2021-03-05 PROCEDURE — 63600175 PHARM REV CODE 636 W HCPCS: Mod: NTX | Performed by: STUDENT IN AN ORGANIZED HEALTH CARE EDUCATION/TRAINING PROGRAM

## 2021-03-05 PROCEDURE — 99226 PR SUBSEQUENT OBSERVATION CARE,LEVEL III: ICD-10-PCS | Mod: NTX,,, | Performed by: INTERNAL MEDICINE

## 2021-03-05 PROCEDURE — 99226 PR SUBSEQUENT OBSERVATION CARE,LEVEL III: CPT | Mod: NTX,,, | Performed by: INTERNAL MEDICINE

## 2021-03-05 PROCEDURE — 96376 TX/PRO/DX INJ SAME DRUG ADON: CPT

## 2021-03-05 PROCEDURE — 83735 ASSAY OF MAGNESIUM: CPT | Mod: NTX | Performed by: STUDENT IN AN ORGANIZED HEALTH CARE EDUCATION/TRAINING PROGRAM

## 2021-03-05 PROCEDURE — 87324 CLOSTRIDIUM AG IA: CPT | Mod: NTX | Performed by: STUDENT IN AN ORGANIZED HEALTH CARE EDUCATION/TRAINING PROGRAM

## 2021-03-05 RX ORDER — FUROSEMIDE 80 MG/1
80 TABLET ORAL 2 TIMES DAILY
Status: DISCONTINUED | OUTPATIENT
Start: 2021-03-05 | End: 2021-03-05 | Stop reason: HOSPADM

## 2021-03-05 RX ORDER — ERGOCALCIFEROL 1.25 MG/1
50000 CAPSULE ORAL
Status: DISCONTINUED | OUTPATIENT
Start: 2021-03-05 | End: 2021-03-05 | Stop reason: HOSPADM

## 2021-03-05 RX ORDER — HYDRALAZINE HYDROCHLORIDE 50 MG/1
50 TABLET, FILM COATED ORAL 2 TIMES DAILY
Qty: 60 TABLET | Refills: 11 | Status: SHIPPED | OUTPATIENT
Start: 2021-03-05 | End: 2021-05-07 | Stop reason: SDUPTHER

## 2021-03-05 RX ORDER — HYDRALAZINE HYDROCHLORIDE 50 MG/1
50 TABLET, FILM COATED ORAL 2 TIMES DAILY
Status: DISCONTINUED | OUTPATIENT
Start: 2021-03-05 | End: 2021-03-05 | Stop reason: HOSPADM

## 2021-03-05 RX ORDER — NIFEDIPINE 30 MG/1
60 TABLET, EXTENDED RELEASE ORAL 2 TIMES DAILY
Status: DISCONTINUED | OUTPATIENT
Start: 2021-03-05 | End: 2021-03-05 | Stop reason: HOSPADM

## 2021-03-05 RX ORDER — NIFEDIPINE 60 MG/1
60 TABLET, EXTENDED RELEASE ORAL 2 TIMES DAILY
Qty: 60 TABLET | Refills: 11 | Status: SHIPPED | OUTPATIENT
Start: 2021-03-05 | End: 2022-03-05

## 2021-03-05 RX ORDER — CARVEDILOL 25 MG/1
25 TABLET ORAL 2 TIMES DAILY WITH MEALS
Status: DISCONTINUED | OUTPATIENT
Start: 2021-03-05 | End: 2021-03-05 | Stop reason: HOSPADM

## 2021-03-05 RX ADMIN — CARVEDILOL 25 MG: 25 TABLET, FILM COATED ORAL at 09:03

## 2021-03-05 RX ADMIN — HYDRALAZINE HYDROCHLORIDE 50 MG: 50 TABLET, FILM COATED ORAL at 12:03

## 2021-03-05 RX ADMIN — FERROUS SULFATE TAB EC 325 MG (65 MG FE EQUIVALENT) 325 MG: 325 (65 FE) TABLET DELAYED RESPONSE at 09:03

## 2021-03-05 RX ADMIN — SEVELAMER CARBONATE 800 MG: 800 TABLET, FILM COATED ORAL at 12:03

## 2021-03-05 RX ADMIN — FUROSEMIDE 80 MG: 10 INJECTION, SOLUTION INTRAMUSCULAR; INTRAVENOUS at 12:03

## 2021-03-05 RX ADMIN — ESCITALOPRAM OXALATE 20 MG: 20 TABLET ORAL at 09:03

## 2021-03-05 RX ADMIN — INSULIN ASPART 5 UNITS: 100 INJECTION, SOLUTION INTRAVENOUS; SUBCUTANEOUS at 08:03

## 2021-03-05 RX ADMIN — NIFEDIPINE 60 MG: 30 TABLET, FILM COATED, EXTENDED RELEASE ORAL at 09:03

## 2021-03-05 RX ADMIN — SEVELAMER CARBONATE 800 MG: 800 TABLET, FILM COATED ORAL at 09:03

## 2021-03-05 RX ADMIN — INSULIN ASPART 5 UNITS: 100 INJECTION, SOLUTION INTRAVENOUS; SUBCUTANEOUS at 12:03

## 2021-03-05 RX ADMIN — INSULIN DETEMIR 8 UNITS: 100 INJECTION, SOLUTION SUBCUTANEOUS at 08:03

## 2021-03-05 RX ADMIN — ERGOCALCIFEROL 50000 UNITS: 1.25 CAPSULE ORAL at 11:03

## 2021-03-05 RX ADMIN — FUROSEMIDE 80 MG: 10 INJECTION, SOLUTION INTRAMUSCULAR; INTRAVENOUS at 06:03

## 2021-03-05 RX ADMIN — Medication 16 G: at 03:03

## 2021-03-05 RX ADMIN — HYDRALAZINE HYDROCHLORIDE 50 MG: 50 TABLET, FILM COATED ORAL at 09:03

## 2021-03-05 RX ADMIN — SODIUM BICARBONATE 650 MG TABLET 650 MG: at 09:03

## 2021-03-07 LAB
E COLI SXT1 STL QL IA: NEGATIVE
E COLI SXT2 STL QL IA: NEGATIVE

## 2021-03-08 LAB — BACTERIA STL CULT: NORMAL

## 2021-03-10 ENCOUNTER — TELEPHONE (OUTPATIENT)
Dept: TRANSPLANT | Facility: CLINIC | Age: 26
End: 2021-03-10

## 2021-03-10 ENCOUNTER — OFFICE VISIT (OUTPATIENT)
Dept: NEPHROLOGY | Facility: CLINIC | Age: 26
End: 2021-03-10
Payer: MEDICAID

## 2021-03-10 VITALS
OXYGEN SATURATION: 98 % | SYSTOLIC BLOOD PRESSURE: 134 MMHG | WEIGHT: 170.19 LBS | HEIGHT: 64 IN | DIASTOLIC BLOOD PRESSURE: 88 MMHG | HEART RATE: 93 BPM | BODY MASS INDEX: 29.06 KG/M2

## 2021-03-10 DIAGNOSIS — N18.5 CKD (CHRONIC KIDNEY DISEASE) STAGE 5, GFR LESS THAN 15 ML/MIN: Primary | ICD-10-CM

## 2021-03-10 DIAGNOSIS — Z01.818 PREOP EXAMINATION: Primary | ICD-10-CM

## 2021-03-10 DIAGNOSIS — N18.4 CKD (CHRONIC KIDNEY DISEASE) STAGE 4, GFR 15-29 ML/MIN: ICD-10-CM

## 2021-03-10 PROCEDURE — 99215 OFFICE O/P EST HI 40 MIN: CPT | Mod: S$PBB,,, | Performed by: INTERNAL MEDICINE

## 2021-03-10 PROCEDURE — 99215 OFFICE O/P EST HI 40 MIN: CPT | Mod: PBBFAC | Performed by: INTERNAL MEDICINE

## 2021-03-10 PROCEDURE — 99999 PR PBB SHADOW E&M-EST. PATIENT-LVL V: ICD-10-PCS | Mod: PBBFAC,,, | Performed by: INTERNAL MEDICINE

## 2021-03-10 PROCEDURE — 99999 PR PBB SHADOW E&M-EST. PATIENT-LVL V: CPT | Mod: PBBFAC,,, | Performed by: INTERNAL MEDICINE

## 2021-03-10 PROCEDURE — 99215 PR OFFICE/OUTPT VISIT, EST, LEVL V, 40-54 MIN: ICD-10-PCS | Mod: S$PBB,,, | Performed by: INTERNAL MEDICINE

## 2021-03-10 RX ORDER — EPINEPHRINE 0.3 MG/.3ML
0.3 INJECTION SUBCUTANEOUS ONCE AS NEEDED
Status: CANCELLED | OUTPATIENT
Start: 2021-03-10

## 2021-03-10 RX ORDER — SODIUM CHLORIDE 0.9 % (FLUSH) 0.9 %
10 SYRINGE (ML) INJECTION
Status: CANCELLED | OUTPATIENT
Start: 2021-03-10

## 2021-03-10 RX ORDER — HEPARIN 100 UNIT/ML
500 SYRINGE INTRAVENOUS
Status: CANCELLED | OUTPATIENT
Start: 2021-03-10

## 2021-03-10 RX ORDER — METHYLPREDNISOLONE SOD SUCC 125 MG
125 VIAL (EA) INJECTION ONCE AS NEEDED
Status: CANCELLED | OUTPATIENT
Start: 2021-03-10

## 2021-03-10 RX ORDER — DIPHENHYDRAMINE HYDROCHLORIDE 50 MG/ML
50 INJECTION INTRAMUSCULAR; INTRAVENOUS ONCE AS NEEDED
Status: CANCELLED | OUTPATIENT
Start: 2021-03-10

## 2021-03-10 RX ORDER — SEVELAMER CARBONATE 800 MG/1
1600 TABLET, FILM COATED ORAL
Qty: 180 TABLET | Refills: 1 | Status: ON HOLD
Start: 2021-03-10 | End: 2021-05-28 | Stop reason: SDUPTHER

## 2021-03-10 RX ORDER — CALCITRIOL 0.5 UG/1
0.5 CAPSULE ORAL DAILY
Qty: 90 CAPSULE | Refills: 0 | Status: SHIPPED | OUTPATIENT
Start: 2021-03-10 | End: 2021-06-08

## 2021-03-11 ENCOUNTER — HOSPITAL ENCOUNTER (OUTPATIENT)
Dept: VASCULAR SURGERY | Facility: CLINIC | Age: 26
Discharge: HOME | End: 2021-03-11
Attending: SURGERY
Payer: MEDICAID

## 2021-03-11 ENCOUNTER — INITIAL CONSULT (OUTPATIENT)
Dept: VASCULAR SURGERY | Facility: CLINIC | Age: 26
End: 2021-03-11
Attending: SURGERY
Payer: MEDICAID

## 2021-03-11 VITALS
SYSTOLIC BLOOD PRESSURE: 139 MMHG | BODY MASS INDEX: 28.98 KG/M2 | HEART RATE: 82 BPM | WEIGHT: 169.75 LBS | TEMPERATURE: 98 F | HEIGHT: 64 IN | DIASTOLIC BLOOD PRESSURE: 70 MMHG

## 2021-03-11 DIAGNOSIS — Z01.818 PREOP EXAMINATION: ICD-10-CM

## 2021-03-11 DIAGNOSIS — N18.5 CKD (CHRONIC KIDNEY DISEASE) STAGE 5, GFR LESS THAN 15 ML/MIN: Primary | ICD-10-CM

## 2021-03-11 PROCEDURE — 93970 EXTREMITY STUDY: CPT | Mod: PBBFAC,NTX | Performed by: SURGERY

## 2021-03-11 PROCEDURE — 99215 OFFICE O/P EST HI 40 MIN: CPT | Mod: PBBFAC,25,NTX | Performed by: SURGERY

## 2021-03-11 PROCEDURE — 99204 OFFICE O/P NEW MOD 45 MIN: CPT | Mod: S$PBB,NTX,, | Performed by: SURGERY

## 2021-03-11 PROCEDURE — 99999 PR PBB SHADOW E&M-EST. PATIENT-LVL V: CPT | Mod: PBBFAC,TXP,, | Performed by: SURGERY

## 2021-03-11 PROCEDURE — 99999 PR PBB SHADOW E&M-EST. PATIENT-LVL V: ICD-10-PCS | Mod: PBBFAC,TXP,, | Performed by: SURGERY

## 2021-03-11 PROCEDURE — 93970 PR US DUPLEX, UPPER OR LOWER EXT VENOUS,COMPLETE BILAT: ICD-10-PCS | Mod: 26,S$PBB,NTX, | Performed by: SURGERY

## 2021-03-11 PROCEDURE — 99204 PR OFFICE/OUTPT VISIT, NEW, LEVL IV, 45-59 MIN: ICD-10-PCS | Mod: S$PBB,NTX,, | Performed by: SURGERY

## 2021-03-11 PROCEDURE — 93970 EXTREMITY STUDY: CPT | Mod: 26,S$PBB,NTX, | Performed by: SURGERY

## 2021-03-12 ENCOUNTER — PATIENT MESSAGE (OUTPATIENT)
Dept: NEPHROLOGY | Facility: CLINIC | Age: 26
End: 2021-03-12

## 2021-03-17 ENCOUNTER — DOCUMENTATION ONLY (OUTPATIENT)
Dept: REHABILITATION | Facility: OTHER | Age: 26
End: 2021-03-17

## 2021-03-22 ENCOUNTER — DOCUMENTATION ONLY (OUTPATIENT)
Dept: TRANSPLANT | Facility: CLINIC | Age: 26
End: 2021-03-22

## 2021-03-22 DIAGNOSIS — Z76.82 ORGAN TRANSPLANT CANDIDATE: Primary | ICD-10-CM

## 2021-03-24 ENCOUNTER — TELEPHONE (OUTPATIENT)
Dept: NEPHROLOGY | Facility: CLINIC | Age: 26
End: 2021-03-24

## 2021-03-24 ENCOUNTER — INFUSION (OUTPATIENT)
Dept: INFECTIOUS DISEASES | Facility: HOSPITAL | Age: 26
End: 2021-03-24
Attending: INTERNAL MEDICINE
Payer: MEDICAID

## 2021-03-24 VITALS
BODY MASS INDEX: 28 KG/M2 | DIASTOLIC BLOOD PRESSURE: 85 MMHG | SYSTOLIC BLOOD PRESSURE: 167 MMHG | RESPIRATION RATE: 18 BRPM | TEMPERATURE: 99 F | WEIGHT: 163.13 LBS | HEART RATE: 94 BPM

## 2021-03-24 DIAGNOSIS — D50.8 OTHER IRON DEFICIENCY ANEMIA: ICD-10-CM

## 2021-03-24 DIAGNOSIS — N18.4 ANEMIA DUE TO STAGE 4 CHRONIC KIDNEY DISEASE: Primary | ICD-10-CM

## 2021-03-24 DIAGNOSIS — D63.1 ANEMIA DUE TO STAGE 4 CHRONIC KIDNEY DISEASE: Primary | ICD-10-CM

## 2021-03-24 PROCEDURE — 25000003 PHARM REV CODE 250: Mod: TXP | Performed by: INTERNAL MEDICINE

## 2021-03-24 PROCEDURE — 63600175 PHARM REV CODE 636 W HCPCS: Mod: NTX | Performed by: INTERNAL MEDICINE

## 2021-03-24 PROCEDURE — 96365 THER/PROPH/DIAG IV INF INIT: CPT | Mod: NTX

## 2021-03-24 RX ORDER — SODIUM CHLORIDE 0.9 % (FLUSH) 0.9 %
10 SYRINGE (ML) INJECTION
Status: CANCELLED | OUTPATIENT
Start: 2021-03-31

## 2021-03-24 RX ORDER — HEPARIN 100 UNIT/ML
500 SYRINGE INTRAVENOUS
Status: DISCONTINUED | OUTPATIENT
Start: 2021-03-24 | End: 2021-03-24 | Stop reason: HOSPADM

## 2021-03-24 RX ORDER — SODIUM CHLORIDE 0.9 % (FLUSH) 0.9 %
10 SYRINGE (ML) INJECTION
Status: DISCONTINUED | OUTPATIENT
Start: 2021-03-24 | End: 2021-03-24 | Stop reason: HOSPADM

## 2021-03-24 RX ORDER — DIPHENHYDRAMINE HYDROCHLORIDE 50 MG/ML
50 INJECTION INTRAMUSCULAR; INTRAVENOUS ONCE AS NEEDED
Status: CANCELLED | OUTPATIENT
Start: 2021-03-31

## 2021-03-24 RX ORDER — METHYLPREDNISOLONE SOD SUCC 125 MG
125 VIAL (EA) INJECTION ONCE AS NEEDED
Status: CANCELLED | OUTPATIENT
Start: 2021-03-31

## 2021-03-24 RX ORDER — EPINEPHRINE 0.3 MG/.3ML
0.3 INJECTION SUBCUTANEOUS ONCE AS NEEDED
Status: CANCELLED | OUTPATIENT
Start: 2021-03-31

## 2021-03-24 RX ORDER — HEPARIN 100 UNIT/ML
500 SYRINGE INTRAVENOUS
Status: CANCELLED | OUTPATIENT
Start: 2021-03-31

## 2021-03-24 RX ADMIN — SODIUM CHLORIDE: 0.9 INJECTION, SOLUTION INTRAVENOUS at 02:03

## 2021-03-24 RX ADMIN — IRON SUCROSE 100 MG: 20 INJECTION, SOLUTION INTRAVENOUS at 02:03

## 2021-03-25 ENCOUNTER — LAB VISIT (OUTPATIENT)
Dept: LAB | Facility: HOSPITAL | Age: 26
End: 2021-03-25
Payer: MEDICAID

## 2021-03-25 DIAGNOSIS — N18.5 CKD (CHRONIC KIDNEY DISEASE) STAGE 5, GFR LESS THAN 15 ML/MIN: ICD-10-CM

## 2021-03-25 LAB
ALBUMIN SERPL BCP-MCNC: 2 G/DL (ref 3.5–5.2)
ANION GAP SERPL CALC-SCNC: 9 MMOL/L (ref 8–16)
BASOPHILS # BLD AUTO: 0.05 K/UL (ref 0–0.2)
BASOPHILS NFR BLD: 0.7 % (ref 0–1.9)
BUN SERPL-MCNC: 34 MG/DL (ref 6–20)
CALCIUM SERPL-MCNC: 8.6 MG/DL (ref 8.7–10.5)
CHLORIDE SERPL-SCNC: 109 MMOL/L (ref 95–110)
CO2 SERPL-SCNC: 16 MMOL/L (ref 23–29)
CREAT SERPL-MCNC: 6.2 MG/DL (ref 0.5–1.4)
DIFFERENTIAL METHOD: ABNORMAL
EOSINOPHIL # BLD AUTO: 0.1 K/UL (ref 0–0.5)
EOSINOPHIL NFR BLD: 1.8 % (ref 0–8)
ERYTHROCYTE [DISTWIDTH] IN BLOOD BY AUTOMATED COUNT: 13.8 % (ref 11.5–14.5)
EST. GFR  (AFRICAN AMERICAN): 9.9 ML/MIN/1.73 M^2
EST. GFR  (NON AFRICAN AMERICAN): 8.6 ML/MIN/1.73 M^2
GLUCOSE SERPL-MCNC: 337 MG/DL (ref 70–110)
HCT VFR BLD AUTO: 31.4 % (ref 37–48.5)
HGB BLD-MCNC: 10 G/DL (ref 12–16)
IMM GRANULOCYTES # BLD AUTO: 0.02 K/UL (ref 0–0.04)
IMM GRANULOCYTES NFR BLD AUTO: 0.3 % (ref 0–0.5)
LYMPHOCYTES # BLD AUTO: 2.4 K/UL (ref 1–4.8)
LYMPHOCYTES NFR BLD: 33.9 % (ref 18–48)
MCH RBC QN AUTO: 28.1 PG (ref 27–31)
MCHC RBC AUTO-ENTMCNC: 31.8 G/DL (ref 32–36)
MCV RBC AUTO: 88 FL (ref 82–98)
MONOCYTES # BLD AUTO: 0.4 K/UL (ref 0.3–1)
MONOCYTES NFR BLD: 6.1 % (ref 4–15)
NEUTROPHILS # BLD AUTO: 4 K/UL (ref 1.8–7.7)
NEUTROPHILS NFR BLD: 57.2 % (ref 38–73)
NRBC BLD-RTO: 0 /100 WBC
PHOSPHATE SERPL-MCNC: 4.3 MG/DL (ref 2.7–4.5)
PLATELET # BLD AUTO: 391 K/UL (ref 150–350)
PMV BLD AUTO: 11.4 FL (ref 9.2–12.9)
POTASSIUM SERPL-SCNC: 4.8 MMOL/L (ref 3.5–5.1)
RBC # BLD AUTO: 3.56 M/UL (ref 4–5.4)
SODIUM SERPL-SCNC: 134 MMOL/L (ref 136–145)
WBC # BLD AUTO: 7.04 K/UL (ref 3.9–12.7)

## 2021-03-25 PROCEDURE — 80069 RENAL FUNCTION PANEL: CPT | Mod: NTX | Performed by: INTERNAL MEDICINE

## 2021-03-25 PROCEDURE — 86334 IMMUNOFIX E-PHORESIS SERUM: CPT | Mod: 26,NTX,, | Performed by: PATHOLOGY

## 2021-03-25 PROCEDURE — 36415 COLL VENOUS BLD VENIPUNCTURE: CPT | Mod: NTX | Performed by: INTERNAL MEDICINE

## 2021-03-25 PROCEDURE — 86334 PATHOLOGIST INTERPRETATION IFE: ICD-10-PCS | Mod: 26,NTX,, | Performed by: PATHOLOGY

## 2021-03-25 PROCEDURE — 83520 IMMUNOASSAY QUANT NOS NONAB: CPT | Mod: NTX | Performed by: INTERNAL MEDICINE

## 2021-03-25 PROCEDURE — 84165 PROTEIN E-PHORESIS SERUM: CPT | Mod: NTX | Performed by: INTERNAL MEDICINE

## 2021-03-25 PROCEDURE — 84165 PATHOLOGIST INTERPRETATION SPE: ICD-10-PCS | Mod: 26,NTX,, | Performed by: PATHOLOGY

## 2021-03-25 PROCEDURE — 86334 IMMUNOFIX E-PHORESIS SERUM: CPT | Mod: NTX | Performed by: INTERNAL MEDICINE

## 2021-03-25 PROCEDURE — 85025 COMPLETE CBC W/AUTO DIFF WBC: CPT | Mod: TXP | Performed by: INTERNAL MEDICINE

## 2021-03-25 PROCEDURE — 84165 PROTEIN E-PHORESIS SERUM: CPT | Mod: 26,NTX,, | Performed by: PATHOLOGY

## 2021-03-26 ENCOUNTER — OFFICE VISIT (OUTPATIENT)
Dept: NEPHROLOGY | Facility: CLINIC | Age: 26
End: 2021-03-26
Payer: MEDICAID

## 2021-03-26 VITALS
SYSTOLIC BLOOD PRESSURE: 152 MMHG | HEART RATE: 91 BPM | OXYGEN SATURATION: 97 % | BODY MASS INDEX: 27.67 KG/M2 | DIASTOLIC BLOOD PRESSURE: 90 MMHG | WEIGHT: 162.06 LBS | HEIGHT: 64 IN

## 2021-03-26 DIAGNOSIS — N18.4 CKD (CHRONIC KIDNEY DISEASE) STAGE 4, GFR 15-29 ML/MIN: ICD-10-CM

## 2021-03-26 LAB
ALBUMIN SERPL ELPH-MCNC: 2.53 G/DL (ref 3.35–5.55)
ALPHA1 GLOB SERPL ELPH-MCNC: 0.35 G/DL (ref 0.17–0.41)
ALPHA2 GLOB SERPL ELPH-MCNC: 1.02 G/DL (ref 0.43–0.99)
B-GLOBULIN SERPL ELPH-MCNC: 0.76 G/DL (ref 0.5–1.1)
GAMMA GLOB SERPL ELPH-MCNC: 0.95 G/DL (ref 0.67–1.58)
INTERPRETATION SERPL IFE-IMP: NORMAL
KAPPA LC SER QL IA: 13.11 MG/DL (ref 0.33–1.94)
KAPPA LC/LAMBDA SER IA: 2.05 (ref 0.26–1.65)
LAMBDA LC SER QL IA: 6.41 MG/DL (ref 0.57–2.63)
PROT SERPL-MCNC: 5.6 G/DL (ref 6–8.4)

## 2021-03-26 PROCEDURE — 99215 OFFICE O/P EST HI 40 MIN: CPT | Mod: S$PBB,,, | Performed by: INTERNAL MEDICINE

## 2021-03-26 PROCEDURE — 99999 PR PBB SHADOW E&M-EST. PATIENT-LVL V: ICD-10-PCS | Mod: PBBFAC,,, | Performed by: INTERNAL MEDICINE

## 2021-03-26 PROCEDURE — 99999 PR PBB SHADOW E&M-EST. PATIENT-LVL V: CPT | Mod: PBBFAC,,, | Performed by: INTERNAL MEDICINE

## 2021-03-26 PROCEDURE — 99215 OFFICE O/P EST HI 40 MIN: CPT | Mod: PBBFAC | Performed by: INTERNAL MEDICINE

## 2021-03-26 PROCEDURE — 99215 PR OFFICE/OUTPT VISIT, EST, LEVL V, 40-54 MIN: ICD-10-PCS | Mod: S$PBB,,, | Performed by: INTERNAL MEDICINE

## 2021-03-26 RX ORDER — FUROSEMIDE 40 MG/1
120 TABLET ORAL 2 TIMES DAILY
Qty: 540 TABLET | Refills: 3
Start: 2021-03-26 | End: 2021-05-07 | Stop reason: ALTCHOICE

## 2021-03-26 RX ORDER — SODIUM BICARBONATE 650 MG/1
1300 TABLET ORAL 2 TIMES DAILY
Qty: 30 TABLET | Refills: 11
Start: 2021-03-26 | End: 2021-05-09

## 2021-03-29 LAB
PATHOLOGIST INTERPRETATION IFE: NORMAL
PATHOLOGIST INTERPRETATION SPE: NORMAL

## 2021-03-29 NOTE — PROGRESS NOTES
Spoke to patient to complete her history for her upcoming appointment her will come with her to her appointment she is aware that she have to bring a small breakfast/snack to eat after blood is drawn she will not need a

## 2021-04-05 ENCOUNTER — LAB VISIT (OUTPATIENT)
Dept: INTERNAL MEDICINE | Facility: CLINIC | Age: 26
End: 2021-04-05
Attending: SURGERY
Payer: MEDICAID

## 2021-04-05 DIAGNOSIS — Z01.818 PREOP EXAMINATION: ICD-10-CM

## 2021-04-05 PROCEDURE — U0005 INFEC AGEN DETEC AMPLI PROBE: HCPCS | Performed by: SURGERY

## 2021-04-05 PROCEDURE — U0003 INFECTIOUS AGENT DETECTION BY NUCLEIC ACID (DNA OR RNA); SEVERE ACUTE RESPIRATORY SYNDROME CORONAVIRUS 2 (SARS-COV-2) (CORONAVIRUS DISEASE [COVID-19]), AMPLIFIED PROBE TECHNIQUE, MAKING USE OF HIGH THROUGHPUT TECHNOLOGIES AS DESCRIBED BY CMS-2020-01-R: HCPCS | Performed by: SURGERY

## 2021-04-06 ENCOUNTER — HOSPITAL ENCOUNTER (OUTPATIENT)
Dept: RADIOLOGY | Facility: HOSPITAL | Age: 26
Discharge: HOME | End: 2021-04-06
Attending: NURSE PRACTITIONER
Payer: MEDICAID

## 2021-04-06 ENCOUNTER — OFFICE VISIT (OUTPATIENT)
Dept: TRANSPLANT | Facility: CLINIC | Age: 26
End: 2021-04-06
Payer: MEDICAID

## 2021-04-06 ENCOUNTER — ANESTHESIA EVENT (OUTPATIENT)
Dept: SURGERY | Facility: HOSPITAL | Age: 26
End: 2021-04-06
Payer: MEDICAID

## 2021-04-06 ENCOUNTER — PATIENT MESSAGE (OUTPATIENT)
Dept: ENDOCRINOLOGY | Facility: CLINIC | Age: 26
End: 2021-04-06

## 2021-04-06 ENCOUNTER — TELEPHONE (OUTPATIENT)
Dept: VASCULAR SURGERY | Facility: CLINIC | Age: 26
End: 2021-04-06

## 2021-04-06 VITALS
TEMPERATURE: 98 F | HEART RATE: 94 BPM | BODY MASS INDEX: 27.44 KG/M2 | OXYGEN SATURATION: 94 % | WEIGHT: 164.69 LBS | SYSTOLIC BLOOD PRESSURE: 192 MMHG | DIASTOLIC BLOOD PRESSURE: 116 MMHG | HEIGHT: 65 IN | RESPIRATION RATE: 18 BRPM

## 2021-04-06 DIAGNOSIS — N18.5 TYPE 1 DIABETES MELLITUS WITH STAGE 5 CHRONIC KIDNEY DISEASE NOT ON CHRONIC DIALYSIS: ICD-10-CM

## 2021-04-06 DIAGNOSIS — Z76.82 ORGAN TRANSPLANT CANDIDATE: ICD-10-CM

## 2021-04-06 DIAGNOSIS — I15.0 RENOVASCULAR HYPERTENSION: ICD-10-CM

## 2021-04-06 DIAGNOSIS — Z01.818 PRE-TRANSPLANT EVALUATION FOR KIDNEY AND PANCREAS TRANSPLANT: Primary | ICD-10-CM

## 2021-04-06 DIAGNOSIS — F33.41 RECURRENT MAJOR DEPRESSIVE DISORDER, IN PARTIAL REMISSION: ICD-10-CM

## 2021-04-06 DIAGNOSIS — E10.22 TYPE 1 DIABETES MELLITUS WITH STAGE 5 CHRONIC KIDNEY DISEASE NOT ON CHRONIC DIALYSIS: ICD-10-CM

## 2021-04-06 DIAGNOSIS — N18.5 CKD (CHRONIC KIDNEY DISEASE), STAGE V: ICD-10-CM

## 2021-04-06 DIAGNOSIS — R80.9 PROTEINURIA, UNSPECIFIED TYPE: ICD-10-CM

## 2021-04-06 DIAGNOSIS — J98.4 RESTRICTIVE LUNG DISEASE: ICD-10-CM

## 2021-04-06 LAB — SARS-COV-2 RNA RESP QL NAA+PROBE: NOT DETECTED

## 2021-04-06 PROCEDURE — 93978 VASCULAR STUDY: CPT | Mod: TC,TXP

## 2021-04-06 PROCEDURE — 71046 XR CHEST PA AND LATERAL: ICD-10-PCS | Mod: 26,TXP,, | Performed by: RADIOLOGY

## 2021-04-06 PROCEDURE — 99215 OFFICE O/P EST HI 40 MIN: CPT | Mod: PBBFAC,25,TXP | Performed by: NURSE PRACTITIONER

## 2021-04-06 PROCEDURE — 76770 US EXAM ABDO BACK WALL COMP: CPT | Mod: 26,TXP,, | Performed by: RADIOLOGY

## 2021-04-06 PROCEDURE — 76700 US EXAM ABDOM COMPLETE: CPT | Mod: 26,TXP,, | Performed by: RADIOLOGY

## 2021-04-06 PROCEDURE — 72170 X-RAY EXAM OF PELVIS: CPT | Mod: TC,TXP

## 2021-04-06 PROCEDURE — 71046 X-RAY EXAM CHEST 2 VIEWS: CPT | Mod: TC,TXP

## 2021-04-06 PROCEDURE — 76700 US EXAM ABDOM COMPLETE: CPT | Mod: TC,TXP

## 2021-04-06 PROCEDURE — 93978 VASCULAR STUDY: CPT | Mod: 26,TXP,, | Performed by: RADIOLOGY

## 2021-04-06 PROCEDURE — 99205 PR OFFICE/OUTPT VISIT, NEW, LEVL V, 60-74 MIN: ICD-10-PCS | Mod: S$PBB,TXP,, | Performed by: NURSE PRACTITIONER

## 2021-04-06 PROCEDURE — 99999 PR PBB SHADOW E&M-EST. PATIENT-LVL V: ICD-10-PCS | Mod: PBBFAC,TXP,, | Performed by: NURSE PRACTITIONER

## 2021-04-06 PROCEDURE — 99204 OFFICE O/P NEW MOD 45 MIN: CPT | Mod: S$PBB,TXP,, | Performed by: TRANSPLANT SURGERY

## 2021-04-06 PROCEDURE — 99204 PR OFFICE/OUTPT VISIT, NEW, LEVL IV, 45-59 MIN: ICD-10-PCS | Mod: S$PBB,TXP,, | Performed by: TRANSPLANT SURGERY

## 2021-04-06 PROCEDURE — 93978 US DOPP ILIACS BILATERAL: ICD-10-PCS | Mod: 26,TXP,, | Performed by: RADIOLOGY

## 2021-04-06 PROCEDURE — 76770 US EXAM ABDO BACK WALL COMP: CPT | Mod: TC,TXP

## 2021-04-06 PROCEDURE — 72170 XR PELVIS ROUTINE AP: ICD-10-PCS | Mod: 26,TXP,, | Performed by: RADIOLOGY

## 2021-04-06 PROCEDURE — 99205 OFFICE O/P NEW HI 60 MIN: CPT | Mod: S$PBB,TXP,, | Performed by: NURSE PRACTITIONER

## 2021-04-06 PROCEDURE — 72170 X-RAY EXAM OF PELVIS: CPT | Mod: 26,TXP,, | Performed by: RADIOLOGY

## 2021-04-06 PROCEDURE — 71046 X-RAY EXAM CHEST 2 VIEWS: CPT | Mod: 26,TXP,, | Performed by: RADIOLOGY

## 2021-04-06 PROCEDURE — 76700 US ABDOMEN COMPLETE: ICD-10-PCS | Mod: 26,TXP,, | Performed by: RADIOLOGY

## 2021-04-06 PROCEDURE — 76770 US RETROPERITONEAL COMPLETE: ICD-10-PCS | Mod: 26,TXP,, | Performed by: RADIOLOGY

## 2021-04-06 PROCEDURE — 99999 PR PBB SHADOW E&M-EST. PATIENT-LVL V: CPT | Mod: PBBFAC,TXP,, | Performed by: NURSE PRACTITIONER

## 2021-04-06 PROCEDURE — 97802 MEDICAL NUTRITION INDIV IN: CPT | Mod: PBBFAC,TXP | Performed by: DIETITIAN, REGISTERED

## 2021-04-07 ENCOUNTER — HOSPITAL ENCOUNTER (OUTPATIENT)
Facility: HOSPITAL | Age: 26
Discharge: HOME | End: 2021-04-07
Attending: SURGERY | Admitting: SURGERY
Payer: MEDICAID

## 2021-04-07 ENCOUNTER — ANESTHESIA (OUTPATIENT)
Dept: SURGERY | Facility: HOSPITAL | Age: 26
End: 2021-04-07
Payer: MEDICAID

## 2021-04-07 VITALS
BODY MASS INDEX: 26.66 KG/M2 | HEIGHT: 65 IN | SYSTOLIC BLOOD PRESSURE: 122 MMHG | DIASTOLIC BLOOD PRESSURE: 73 MMHG | OXYGEN SATURATION: 98 % | HEART RATE: 77 BPM | WEIGHT: 160 LBS | RESPIRATION RATE: 19 BRPM | TEMPERATURE: 98 F

## 2021-04-07 DIAGNOSIS — N18.5 CKD (CHRONIC KIDNEY DISEASE) STAGE 5, GFR LESS THAN 15 ML/MIN: ICD-10-CM

## 2021-04-07 LAB
POCT GLUCOSE: 164 MG/DL (ref 70–110)
POCT GLUCOSE: 215 MG/DL (ref 70–110)

## 2021-04-07 PROCEDURE — 63600175 PHARM REV CODE 636 W HCPCS: Mod: NTX | Performed by: NURSE ANESTHETIST, CERTIFIED REGISTERED

## 2021-04-07 PROCEDURE — D9220A PRA ANESTHESIA: ICD-10-PCS | Mod: CRNA,NTX,, | Performed by: NURSE ANESTHETIST, CERTIFIED REGISTERED

## 2021-04-07 PROCEDURE — 71000044 HC DOSC ROUTINE RECOVERY FIRST HOUR: Mod: TXP | Performed by: SURGERY

## 2021-04-07 PROCEDURE — 37000008 HC ANESTHESIA 1ST 15 MINUTES: Mod: NTX | Performed by: SURGERY

## 2021-04-07 PROCEDURE — 71000015 HC POSTOP RECOV 1ST HR: Mod: NTX | Performed by: SURGERY

## 2021-04-07 PROCEDURE — 82962 GLUCOSE BLOOD TEST: CPT | Mod: NTX | Performed by: SURGERY

## 2021-04-07 PROCEDURE — 01844 ANES VASC SHUNT/SHUNT REVJ: CPT | Mod: NTX | Performed by: SURGERY

## 2021-04-07 PROCEDURE — D9220A PRA ANESTHESIA: Mod: CRNA,NTX,, | Performed by: NURSE ANESTHETIST, CERTIFIED REGISTERED

## 2021-04-07 PROCEDURE — D9220A PRA ANESTHESIA: Mod: ANES,NTX,, | Performed by: ANESTHESIOLOGY

## 2021-04-07 PROCEDURE — 63600175 PHARM REV CODE 636 W HCPCS: Mod: TXP | Performed by: SURGERY

## 2021-04-07 PROCEDURE — 36821 PR ANASTOMOSIS,AV,ANY SITE: ICD-10-PCS | Mod: NTX,,, | Performed by: SURGERY

## 2021-04-07 PROCEDURE — D9220A PRA ANESTHESIA: ICD-10-PCS | Mod: ANES,NTX,, | Performed by: ANESTHESIOLOGY

## 2021-04-07 PROCEDURE — 36821 AV FUSION DIRECT ANY SITE: CPT | Mod: NTX,,, | Performed by: SURGERY

## 2021-04-07 PROCEDURE — 37000009 HC ANESTHESIA EA ADD 15 MINS: Mod: NTX | Performed by: SURGERY

## 2021-04-07 PROCEDURE — 36000706: Mod: NTX | Performed by: SURGERY

## 2021-04-07 PROCEDURE — 71000016 HC POSTOP RECOV ADDL HR: Mod: TXP | Performed by: SURGERY

## 2021-04-07 PROCEDURE — 36000707: Mod: NTX | Performed by: SURGERY

## 2021-04-07 PROCEDURE — 25000003 PHARM REV CODE 250: Mod: NTX | Performed by: NURSE ANESTHETIST, CERTIFIED REGISTERED

## 2021-04-07 RX ORDER — HYDROCODONE BITARTRATE AND ACETAMINOPHEN 5; 325 MG/1; MG/1
1 TABLET ORAL EVERY 6 HOURS PRN
Qty: 15 TABLET | Refills: 0 | Status: SHIPPED | OUTPATIENT
Start: 2021-04-07 | End: 2021-04-27 | Stop reason: ALTCHOICE

## 2021-04-07 RX ORDER — PROCHLORPERAZINE EDISYLATE 5 MG/ML
5 INJECTION INTRAMUSCULAR; INTRAVENOUS EVERY 30 MIN PRN
Status: DISCONTINUED | OUTPATIENT
Start: 2021-04-07 | End: 2021-04-07 | Stop reason: HOSPADM

## 2021-04-07 RX ORDER — HEPARIN SODIUM 1000 [USP'U]/ML
INJECTION, SOLUTION INTRAVENOUS; SUBCUTANEOUS
Status: DISCONTINUED | OUTPATIENT
Start: 2021-04-07 | End: 2021-04-07

## 2021-04-07 RX ORDER — FENTANYL CITRATE 50 UG/ML
INJECTION, SOLUTION INTRAMUSCULAR; INTRAVENOUS
Status: DISCONTINUED | OUTPATIENT
Start: 2021-04-07 | End: 2021-04-07

## 2021-04-07 RX ORDER — CEFAZOLIN SODIUM 1 G/3ML
INJECTION, POWDER, FOR SOLUTION INTRAMUSCULAR; INTRAVENOUS
Status: DISCONTINUED | OUTPATIENT
Start: 2021-04-07 | End: 2021-04-07

## 2021-04-07 RX ORDER — PAPAVERINE HYDROCHLORIDE 30 MG/ML
INJECTION INTRAMUSCULAR; INTRAVENOUS
Status: DISCONTINUED | OUTPATIENT
Start: 2021-04-07 | End: 2021-04-07 | Stop reason: HOSPADM

## 2021-04-07 RX ORDER — FENTANYL CITRATE 50 UG/ML
25 INJECTION, SOLUTION INTRAMUSCULAR; INTRAVENOUS EVERY 5 MIN PRN
Status: DISCONTINUED | OUTPATIENT
Start: 2021-04-07 | End: 2021-04-07 | Stop reason: HOSPADM

## 2021-04-07 RX ORDER — PROPOFOL 10 MG/ML
VIAL (ML) INTRAVENOUS CONTINUOUS PRN
Status: DISCONTINUED | OUTPATIENT
Start: 2021-04-07 | End: 2021-04-07

## 2021-04-07 RX ORDER — MIDAZOLAM HYDROCHLORIDE 1 MG/ML
INJECTION, SOLUTION INTRAMUSCULAR; INTRAVENOUS
Status: DISCONTINUED | OUTPATIENT
Start: 2021-04-07 | End: 2021-04-07

## 2021-04-07 RX ORDER — MIDAZOLAM HYDROCHLORIDE 1 MG/ML
0.5 INJECTION INTRAMUSCULAR; INTRAVENOUS
Status: DISCONTINUED | OUTPATIENT
Start: 2021-04-07 | End: 2021-04-07 | Stop reason: HOSPADM

## 2021-04-07 RX ORDER — PROTAMINE SULFATE 10 MG/ML
INJECTION, SOLUTION INTRAVENOUS
Status: DISCONTINUED | OUTPATIENT
Start: 2021-04-07 | End: 2021-04-07

## 2021-04-07 RX ORDER — HEPARIN SODIUM 1000 [USP'U]/ML
INJECTION, SOLUTION INTRAVENOUS; SUBCUTANEOUS
Status: DISCONTINUED | OUTPATIENT
Start: 2021-04-07 | End: 2021-04-07 | Stop reason: HOSPADM

## 2021-04-07 RX ORDER — ONDANSETRON 2 MG/ML
INJECTION INTRAMUSCULAR; INTRAVENOUS
Status: DISCONTINUED | OUTPATIENT
Start: 2021-04-07 | End: 2021-04-07

## 2021-04-07 RX ORDER — SODIUM CHLORIDE 0.9 % (FLUSH) 0.9 %
3 SYRINGE (ML) INJECTION
Status: CANCELLED | OUTPATIENT
Start: 2021-04-07

## 2021-04-07 RX ADMIN — PROTAMINE SULFATE 5 MG: 10 INJECTION, SOLUTION INTRAVENOUS at 03:04

## 2021-04-07 RX ADMIN — PROPOFOL 25 MCG/KG/MIN: 10 INJECTION, EMULSION INTRAVENOUS at 02:04

## 2021-04-07 RX ADMIN — ONDANSETRON 4 MG: 2 INJECTION INTRAMUSCULAR; INTRAVENOUS at 03:04

## 2021-04-07 RX ADMIN — SODIUM CHLORIDE: 0.9 INJECTION, SOLUTION INTRAVENOUS at 01:04

## 2021-04-07 RX ADMIN — HEPARIN SODIUM 5000 UNITS: 1000 INJECTION, SOLUTION INTRAVENOUS; SUBCUTANEOUS at 02:04

## 2021-04-07 RX ADMIN — CEFAZOLIN 2 G: 330 INJECTION, POWDER, FOR SOLUTION INTRAMUSCULAR; INTRAVENOUS at 02:04

## 2021-04-07 RX ADMIN — MIDAZOLAM 2 MG: 1 INJECTION INTRAMUSCULAR; INTRAVENOUS at 01:04

## 2021-04-07 RX ADMIN — SODIUM CHLORIDE: 0.9 INJECTION, SOLUTION INTRAVENOUS at 02:04

## 2021-04-07 RX ADMIN — PROTAMINE SULFATE 25 MG: 10 INJECTION, SOLUTION INTRAVENOUS at 03:04

## 2021-04-07 RX ADMIN — FENTANYL CITRATE 50 MCG: 50 INJECTION INTRAMUSCULAR; INTRAVENOUS at 01:04

## 2021-04-07 RX ADMIN — FENTANYL CITRATE 50 MCG: 50 INJECTION INTRAMUSCULAR; INTRAVENOUS at 02:04

## 2021-04-08 ENCOUNTER — TELEPHONE (OUTPATIENT)
Dept: NEPHROLOGY | Facility: CLINIC | Age: 26
End: 2021-04-08

## 2021-04-14 DIAGNOSIS — N18.6 END STAGE RENAL FAILURE ON DIALYSIS: Primary | ICD-10-CM

## 2021-04-14 DIAGNOSIS — Z99.2 END STAGE RENAL FAILURE ON DIALYSIS: Primary | ICD-10-CM

## 2021-04-15 ENCOUNTER — INFUSION (OUTPATIENT)
Dept: INFECTIOUS DISEASES | Facility: HOSPITAL | Age: 26
End: 2021-04-15
Attending: INTERNAL MEDICINE
Payer: MEDICAID

## 2021-04-15 VITALS
OXYGEN SATURATION: 100 % | BODY MASS INDEX: 30.04 KG/M2 | HEIGHT: 65 IN | WEIGHT: 180.31 LBS | TEMPERATURE: 97 F | HEART RATE: 91 BPM | RESPIRATION RATE: 18 BRPM

## 2021-04-19 ENCOUNTER — TELEPHONE (OUTPATIENT)
Dept: TRANSPLANT | Facility: CLINIC | Age: 26
End: 2021-04-19

## 2021-04-19 DIAGNOSIS — Z76.82 ORGAN TRANSPLANT CANDIDATE: Primary | ICD-10-CM

## 2021-04-21 ENCOUNTER — INFUSION (OUTPATIENT)
Dept: INFECTIOUS DISEASES | Facility: HOSPITAL | Age: 26
End: 2021-04-21
Attending: INTERNAL MEDICINE
Payer: MEDICAID

## 2021-04-21 VITALS
HEART RATE: 87 BPM | RESPIRATION RATE: 18 BRPM | TEMPERATURE: 99 F | OXYGEN SATURATION: 100 % | DIASTOLIC BLOOD PRESSURE: 66 MMHG | HEIGHT: 65 IN | BODY MASS INDEX: 29.97 KG/M2 | WEIGHT: 179.88 LBS | SYSTOLIC BLOOD PRESSURE: 124 MMHG

## 2021-04-21 DIAGNOSIS — D50.8 OTHER IRON DEFICIENCY ANEMIA: ICD-10-CM

## 2021-04-21 DIAGNOSIS — D63.1 ANEMIA DUE TO STAGE 4 CHRONIC KIDNEY DISEASE: Primary | ICD-10-CM

## 2021-04-21 DIAGNOSIS — N18.4 ANEMIA DUE TO STAGE 4 CHRONIC KIDNEY DISEASE: Primary | ICD-10-CM

## 2021-04-21 PROCEDURE — 63600175 PHARM REV CODE 636 W HCPCS: Mod: NTX | Performed by: INTERNAL MEDICINE

## 2021-04-21 PROCEDURE — 96365 THER/PROPH/DIAG IV INF INIT: CPT | Mod: TXP

## 2021-04-21 PROCEDURE — 25000003 PHARM REV CODE 250: Mod: TXP | Performed by: INTERNAL MEDICINE

## 2021-04-21 RX ORDER — DIPHENHYDRAMINE HYDROCHLORIDE 50 MG/ML
50 INJECTION INTRAMUSCULAR; INTRAVENOUS ONCE AS NEEDED
Status: DISCONTINUED | OUTPATIENT
Start: 2021-04-21 | End: 2021-04-21 | Stop reason: HOSPADM

## 2021-04-21 RX ORDER — METHYLPREDNISOLONE SOD SUCC 125 MG
125 VIAL (EA) INJECTION ONCE AS NEEDED
Status: DISCONTINUED | OUTPATIENT
Start: 2021-04-21 | End: 2021-04-21 | Stop reason: HOSPADM

## 2021-04-21 RX ORDER — EPINEPHRINE 0.3 MG/.3ML
0.3 INJECTION SUBCUTANEOUS ONCE AS NEEDED
Status: DISCONTINUED | OUTPATIENT
Start: 2021-04-21 | End: 2021-04-21 | Stop reason: HOSPADM

## 2021-04-21 RX ORDER — DIPHENHYDRAMINE HYDROCHLORIDE 50 MG/ML
50 INJECTION INTRAMUSCULAR; INTRAVENOUS ONCE AS NEEDED
Status: CANCELLED | OUTPATIENT
Start: 2021-04-28

## 2021-04-21 RX ORDER — METHYLPREDNISOLONE SOD SUCC 125 MG
125 VIAL (EA) INJECTION ONCE AS NEEDED
Status: CANCELLED | OUTPATIENT
Start: 2021-04-28

## 2021-04-21 RX ORDER — SODIUM CHLORIDE 0.9 % (FLUSH) 0.9 %
10 SYRINGE (ML) INJECTION
Status: CANCELLED | OUTPATIENT
Start: 2021-04-28

## 2021-04-21 RX ORDER — EPINEPHRINE 0.3 MG/.3ML
0.3 INJECTION SUBCUTANEOUS ONCE AS NEEDED
Status: CANCELLED | OUTPATIENT
Start: 2021-04-28

## 2021-04-21 RX ORDER — HEPARIN 100 UNIT/ML
500 SYRINGE INTRAVENOUS
Status: CANCELLED | OUTPATIENT
Start: 2021-04-28

## 2021-04-21 RX ORDER — HEPARIN 100 UNIT/ML
500 SYRINGE INTRAVENOUS
Status: DISCONTINUED | OUTPATIENT
Start: 2021-04-21 | End: 2021-04-21 | Stop reason: HOSPADM

## 2021-04-21 RX ORDER — SODIUM CHLORIDE 0.9 % (FLUSH) 0.9 %
10 SYRINGE (ML) INJECTION
Status: DISCONTINUED | OUTPATIENT
Start: 2021-04-21 | End: 2021-04-21 | Stop reason: HOSPADM

## 2021-04-21 RX ADMIN — IRON SUCROSE 100 MG: 20 INJECTION, SOLUTION INTRAVENOUS at 02:04

## 2021-04-23 ENCOUNTER — TELEPHONE (OUTPATIENT)
Dept: HEPATOLOGY | Facility: CLINIC | Age: 26
End: 2021-04-23

## 2021-04-26 ENCOUNTER — PATIENT MESSAGE (OUTPATIENT)
Dept: RESEARCH | Facility: HOSPITAL | Age: 26
End: 2021-04-26

## 2021-04-27 ENCOUNTER — OFFICE VISIT (OUTPATIENT)
Dept: INTERNAL MEDICINE | Facility: CLINIC | Age: 26
End: 2021-04-27
Attending: FAMILY MEDICINE
Payer: MEDICAID

## 2021-04-27 ENCOUNTER — PATIENT MESSAGE (OUTPATIENT)
Dept: HEPATOLOGY | Facility: CLINIC | Age: 26
End: 2021-04-27

## 2021-04-27 VITALS
RESPIRATION RATE: 18 BRPM | HEIGHT: 65 IN | BODY MASS INDEX: 29.55 KG/M2 | HEART RATE: 91 BPM | SYSTOLIC BLOOD PRESSURE: 139 MMHG | TEMPERATURE: 97 F | OXYGEN SATURATION: 99 % | DIASTOLIC BLOOD PRESSURE: 78 MMHG | WEIGHT: 177.38 LBS

## 2021-04-27 DIAGNOSIS — N18.5 TYPE 1 DIABETES MELLITUS WITH STAGE 5 CHRONIC KIDNEY DISEASE NOT ON CHRONIC DIALYSIS: Primary | ICD-10-CM

## 2021-04-27 DIAGNOSIS — R06.09 DYSPNEA ON EXERTION: ICD-10-CM

## 2021-04-27 DIAGNOSIS — E10.65 UNCONTROLLED TYPE 1 DIABETES MELLITUS WITH HYPERGLYCEMIA: ICD-10-CM

## 2021-04-27 DIAGNOSIS — F41.9 ANXIETY: ICD-10-CM

## 2021-04-27 DIAGNOSIS — I10 HYPERTENSION, ESSENTIAL: ICD-10-CM

## 2021-04-27 DIAGNOSIS — E55.9 VITAMIN D DEFICIENCY: ICD-10-CM

## 2021-04-27 DIAGNOSIS — R60.1 ANASARCA: ICD-10-CM

## 2021-04-27 DIAGNOSIS — N18.5 CKD (CHRONIC KIDNEY DISEASE) STAGE 5, GFR LESS THAN 15 ML/MIN: ICD-10-CM

## 2021-04-27 DIAGNOSIS — F33.41 RECURRENT MAJOR DEPRESSIVE DISORDER, IN PARTIAL REMISSION: ICD-10-CM

## 2021-04-27 DIAGNOSIS — R60.0 BILATERAL LOWER EXTREMITY EDEMA: ICD-10-CM

## 2021-04-27 DIAGNOSIS — N18.5 ANEMIA DUE TO STAGE 5 CHRONIC KIDNEY DISEASE, NOT ON CHRONIC DIALYSIS: ICD-10-CM

## 2021-04-27 DIAGNOSIS — J98.4 RESTRICTIVE LUNG DISEASE: ICD-10-CM

## 2021-04-27 DIAGNOSIS — D63.1 ANEMIA DUE TO STAGE 5 CHRONIC KIDNEY DISEASE, NOT ON CHRONIC DIALYSIS: ICD-10-CM

## 2021-04-27 DIAGNOSIS — E10.22 TYPE 1 DIABETES MELLITUS WITH STAGE 5 CHRONIC KIDNEY DISEASE NOT ON CHRONIC DIALYSIS: Primary | ICD-10-CM

## 2021-04-27 PROBLEM — E87.20 METABOLIC ACIDOSIS: Status: RESOLVED | Noted: 2021-03-04 | Resolved: 2021-04-27

## 2021-04-27 PROBLEM — N17.9 ACUTE RENAL FAILURE SUPERIMPOSED ON STAGE 4 CHRONIC KIDNEY DISEASE: Status: RESOLVED | Noted: 2021-03-04 | Resolved: 2021-04-27

## 2021-04-27 PROBLEM — O36.5920 POOR FETAL GROWTH AFFECTING MANAGEMENT OF MOTHER IN SECOND TRIMESTER: Status: RESOLVED | Noted: 2020-10-15 | Resolved: 2021-04-27

## 2021-04-27 PROBLEM — I16.1 HYPERTENSIVE EMERGENCY: Status: RESOLVED | Noted: 2021-03-04 | Resolved: 2021-04-27

## 2021-04-27 PROBLEM — Z34.90 ENCOUNTER FOR INDUCTION OF LABOR: Status: RESOLVED | Noted: 2020-11-07 | Resolved: 2021-04-27

## 2021-04-27 PROBLEM — O10.912 CHRONIC HYPERTENSION WITH EXACERBATION DURING PREGNANCY IN SECOND TRIMESTER: Status: RESOLVED | Noted: 2020-11-06 | Resolved: 2021-04-27

## 2021-04-27 PROBLEM — N28.9 RENAL DISEASE: Status: RESOLVED | Noted: 2020-07-29 | Resolved: 2021-04-27

## 2021-04-27 PROBLEM — R00.2 PALPITATIONS: Status: RESOLVED | Noted: 2020-08-17 | Resolved: 2021-04-27

## 2021-04-27 PROBLEM — O14.12 SEVERE PRE-ECLAMPSIA IN SECOND TRIMESTER: Status: RESOLVED | Noted: 2020-11-06 | Resolved: 2021-04-27

## 2021-04-27 PROBLEM — N18.4 ACUTE RENAL FAILURE SUPERIMPOSED ON STAGE 4 CHRONIC KIDNEY DISEASE: Status: RESOLVED | Noted: 2021-03-04 | Resolved: 2021-04-27

## 2021-04-27 PROCEDURE — 99204 OFFICE O/P NEW MOD 45 MIN: CPT | Mod: S$PBB,,, | Performed by: FAMILY MEDICINE

## 2021-04-27 PROCEDURE — 99999 PR PBB SHADOW E&M-EST. PATIENT-LVL V: ICD-10-PCS | Mod: PBBFAC,,, | Performed by: FAMILY MEDICINE

## 2021-04-27 PROCEDURE — 99999 PR PBB SHADOW E&M-EST. PATIENT-LVL V: CPT | Mod: PBBFAC,,, | Performed by: FAMILY MEDICINE

## 2021-04-27 PROCEDURE — 99215 OFFICE O/P EST HI 40 MIN: CPT | Mod: PBBFAC | Performed by: FAMILY MEDICINE

## 2021-04-27 PROCEDURE — 99204 PR OFFICE/OUTPT VISIT, NEW, LEVL IV, 45-59 MIN: ICD-10-PCS | Mod: S$PBB,,, | Performed by: FAMILY MEDICINE

## 2021-04-28 ENCOUNTER — INFUSION (OUTPATIENT)
Dept: INFECTIOUS DISEASES | Facility: HOSPITAL | Age: 26
End: 2021-04-28
Attending: INTERNAL MEDICINE
Payer: MEDICAID

## 2021-04-28 VITALS
DIASTOLIC BLOOD PRESSURE: 52 MMHG | SYSTOLIC BLOOD PRESSURE: 106 MMHG | OXYGEN SATURATION: 98 % | TEMPERATURE: 98 F | RESPIRATION RATE: 18 BRPM | HEIGHT: 65 IN | HEART RATE: 90 BPM | WEIGHT: 179.56 LBS | BODY MASS INDEX: 29.92 KG/M2

## 2021-04-28 DIAGNOSIS — D50.8 OTHER IRON DEFICIENCY ANEMIA: ICD-10-CM

## 2021-04-28 DIAGNOSIS — D63.1 ANEMIA DUE TO STAGE 4 CHRONIC KIDNEY DISEASE: Primary | ICD-10-CM

## 2021-04-28 DIAGNOSIS — N18.4 ANEMIA DUE TO STAGE 4 CHRONIC KIDNEY DISEASE: Primary | ICD-10-CM

## 2021-04-28 DIAGNOSIS — R16.0 HEPATOMEGALY: Primary | ICD-10-CM

## 2021-04-28 PROCEDURE — 96365 THER/PROPH/DIAG IV INF INIT: CPT | Mod: NTX

## 2021-04-28 PROCEDURE — 63600175 PHARM REV CODE 636 W HCPCS: Mod: TXP | Performed by: INTERNAL MEDICINE

## 2021-04-28 PROCEDURE — 25000003 PHARM REV CODE 250: Mod: TXP | Performed by: INTERNAL MEDICINE

## 2021-04-28 RX ORDER — EPINEPHRINE 0.3 MG/.3ML
0.3 INJECTION SUBCUTANEOUS ONCE AS NEEDED
Status: CANCELLED | OUTPATIENT
Start: 2021-05-05

## 2021-04-28 RX ORDER — DIPHENHYDRAMINE HYDROCHLORIDE 50 MG/ML
50 INJECTION INTRAMUSCULAR; INTRAVENOUS ONCE AS NEEDED
Status: DISCONTINUED | OUTPATIENT
Start: 2021-04-28 | End: 2021-04-28 | Stop reason: HOSPADM

## 2021-04-28 RX ORDER — METHYLPREDNISOLONE SOD SUCC 125 MG
125 VIAL (EA) INJECTION ONCE AS NEEDED
Status: DISCONTINUED | OUTPATIENT
Start: 2021-04-28 | End: 2021-04-28 | Stop reason: HOSPADM

## 2021-04-28 RX ORDER — HEPARIN 100 UNIT/ML
500 SYRINGE INTRAVENOUS
Status: DISCONTINUED | OUTPATIENT
Start: 2021-04-28 | End: 2021-04-28 | Stop reason: HOSPADM

## 2021-04-28 RX ORDER — SODIUM CHLORIDE 0.9 % (FLUSH) 0.9 %
10 SYRINGE (ML) INJECTION
Status: CANCELLED | OUTPATIENT
Start: 2021-05-05

## 2021-04-28 RX ORDER — SODIUM CHLORIDE 0.9 % (FLUSH) 0.9 %
10 SYRINGE (ML) INJECTION
Status: DISCONTINUED | OUTPATIENT
Start: 2021-04-28 | End: 2021-04-28 | Stop reason: HOSPADM

## 2021-04-28 RX ORDER — EPINEPHRINE 0.3 MG/.3ML
0.3 INJECTION SUBCUTANEOUS ONCE AS NEEDED
Status: DISCONTINUED | OUTPATIENT
Start: 2021-04-28 | End: 2021-04-28 | Stop reason: HOSPADM

## 2021-04-28 RX ORDER — HEPARIN 100 UNIT/ML
500 SYRINGE INTRAVENOUS
Status: CANCELLED | OUTPATIENT
Start: 2021-05-05

## 2021-04-28 RX ORDER — METHYLPREDNISOLONE SOD SUCC 125 MG
125 VIAL (EA) INJECTION ONCE AS NEEDED
Status: CANCELLED | OUTPATIENT
Start: 2021-05-05

## 2021-04-28 RX ORDER — DIPHENHYDRAMINE HYDROCHLORIDE 50 MG/ML
50 INJECTION INTRAMUSCULAR; INTRAVENOUS ONCE AS NEEDED
Status: CANCELLED | OUTPATIENT
Start: 2021-05-05

## 2021-04-28 RX ADMIN — IRON SUCROSE 100 MG: 20 INJECTION, SOLUTION INTRAVENOUS at 02:04

## 2021-04-29 ENCOUNTER — TELEPHONE (OUTPATIENT)
Dept: HEPATOLOGY | Facility: CLINIC | Age: 26
End: 2021-04-29

## 2021-04-29 ENCOUNTER — OFFICE VISIT (OUTPATIENT)
Dept: HEPATOLOGY | Facility: CLINIC | Age: 26
End: 2021-04-29
Payer: MEDICAID

## 2021-04-29 ENCOUNTER — PROCEDURE VISIT (OUTPATIENT)
Dept: HEPATOLOGY | Facility: CLINIC | Age: 26
End: 2021-04-29
Payer: MEDICAID

## 2021-04-29 DIAGNOSIS — R16.0 HEPATOMEGALY: Primary | ICD-10-CM

## 2021-04-29 DIAGNOSIS — R16.0 HEPATOMEGALY: ICD-10-CM

## 2021-04-29 DIAGNOSIS — Z76.82 ORGAN TRANSPLANT CANDIDATE: ICD-10-CM

## 2021-04-29 PROCEDURE — 99203 OFFICE O/P NEW LOW 30 MIN: CPT | Mod: 95,TXP,, | Performed by: INTERNAL MEDICINE

## 2021-04-29 PROCEDURE — 91200 FIBROSCAN (VIBRATION CONTROLLED TRANSIENT ELASTOGRAPHY): ICD-10-PCS | Mod: 26,S$PBB,TXP, | Performed by: INTERNAL MEDICINE

## 2021-04-29 PROCEDURE — 99203 PR OFFICE/OUTPT VISIT, NEW, LEVL III, 30-44 MIN: ICD-10-PCS | Mod: 95,TXP,, | Performed by: INTERNAL MEDICINE

## 2021-04-29 PROCEDURE — 91200 LIVER ELASTOGRAPHY: CPT | Mod: PBBFAC,TXP | Performed by: INTERNAL MEDICINE

## 2021-05-05 ENCOUNTER — OFFICE VISIT (OUTPATIENT)
Dept: ENDOCRINOLOGY | Facility: CLINIC | Age: 26
End: 2021-05-05
Payer: MEDICAID

## 2021-05-05 VITALS
HEART RATE: 94 BPM | DIASTOLIC BLOOD PRESSURE: 82 MMHG | WEIGHT: 175.06 LBS | OXYGEN SATURATION: 99 % | SYSTOLIC BLOOD PRESSURE: 129 MMHG | BODY MASS INDEX: 29.17 KG/M2 | HEIGHT: 65 IN

## 2021-05-05 DIAGNOSIS — E11.649 HYPOGLYCEMIA ASSOCIATED WITH DIABETES: ICD-10-CM

## 2021-05-05 DIAGNOSIS — E10.22 TYPE 1 DIABETES MELLITUS WITH STAGE 5 CHRONIC KIDNEY DISEASE NOT ON CHRONIC DIALYSIS: ICD-10-CM

## 2021-05-05 DIAGNOSIS — N18.5 TYPE 1 DIABETES MELLITUS WITH STAGE 5 CHRONIC KIDNEY DISEASE NOT ON CHRONIC DIALYSIS: ICD-10-CM

## 2021-05-05 PROCEDURE — 99214 PR OFFICE/OUTPT VISIT, EST, LEVL IV, 30-39 MIN: ICD-10-PCS | Mod: S$PBB,NTX,, | Performed by: INTERNAL MEDICINE

## 2021-05-05 PROCEDURE — 99214 OFFICE O/P EST MOD 30 MIN: CPT | Mod: PBBFAC,TXP | Performed by: INTERNAL MEDICINE

## 2021-05-05 PROCEDURE — 99999 PR PBB SHADOW E&M-EST. PATIENT-LVL IV: ICD-10-PCS | Mod: PBBFAC,TXP,, | Performed by: INTERNAL MEDICINE

## 2021-05-05 PROCEDURE — 99999 PR PBB SHADOW E&M-EST. PATIENT-LVL IV: CPT | Mod: PBBFAC,TXP,, | Performed by: INTERNAL MEDICINE

## 2021-05-05 PROCEDURE — 99214 OFFICE O/P EST MOD 30 MIN: CPT | Mod: S$PBB,NTX,, | Performed by: INTERNAL MEDICINE

## 2021-05-06 ENCOUNTER — OFFICE VISIT (OUTPATIENT)
Dept: VASCULAR SURGERY | Facility: CLINIC | Age: 26
End: 2021-05-06
Attending: SURGERY
Payer: MEDICAID

## 2021-05-06 ENCOUNTER — INFUSION (OUTPATIENT)
Dept: INFECTIOUS DISEASES | Facility: HOSPITAL | Age: 26
End: 2021-05-06
Attending: INTERNAL MEDICINE
Payer: MEDICAID

## 2021-05-06 ENCOUNTER — HOSPITAL ENCOUNTER (OUTPATIENT)
Dept: VASCULAR SURGERY | Facility: CLINIC | Age: 26
Discharge: HOME | End: 2021-05-06
Attending: SURGERY
Payer: MEDICAID

## 2021-05-06 VITALS
HEART RATE: 91 BPM | WEIGHT: 179.69 LBS | SYSTOLIC BLOOD PRESSURE: 148 MMHG | HEIGHT: 65 IN | BODY MASS INDEX: 29.94 KG/M2 | TEMPERATURE: 98 F | DIASTOLIC BLOOD PRESSURE: 77 MMHG

## 2021-05-06 VITALS
HEART RATE: 93 BPM | WEIGHT: 180.69 LBS | DIASTOLIC BLOOD PRESSURE: 82 MMHG | TEMPERATURE: 97 F | OXYGEN SATURATION: 99 % | SYSTOLIC BLOOD PRESSURE: 154 MMHG | BODY MASS INDEX: 30.1 KG/M2 | RESPIRATION RATE: 20 BRPM | HEIGHT: 65 IN

## 2021-05-06 DIAGNOSIS — N18.5 CKD (CHRONIC KIDNEY DISEASE) STAGE 5, GFR LESS THAN 15 ML/MIN: Primary | ICD-10-CM

## 2021-05-06 DIAGNOSIS — D50.8 OTHER IRON DEFICIENCY ANEMIA: ICD-10-CM

## 2021-05-06 DIAGNOSIS — N18.4 ANEMIA DUE TO STAGE 4 CHRONIC KIDNEY DISEASE: Primary | ICD-10-CM

## 2021-05-06 DIAGNOSIS — D63.1 ANEMIA DUE TO STAGE 4 CHRONIC KIDNEY DISEASE: Primary | ICD-10-CM

## 2021-05-06 DIAGNOSIS — N18.6 END STAGE RENAL FAILURE ON DIALYSIS: ICD-10-CM

## 2021-05-06 DIAGNOSIS — Z99.2 END STAGE RENAL FAILURE ON DIALYSIS: ICD-10-CM

## 2021-05-06 PROCEDURE — 25000003 PHARM REV CODE 250: Mod: TXP | Performed by: INTERNAL MEDICINE

## 2021-05-06 PROCEDURE — 99214 OFFICE O/P EST MOD 30 MIN: CPT | Mod: PBBFAC,25,TXP | Performed by: SURGERY

## 2021-05-06 PROCEDURE — 99024 POSTOP FOLLOW-UP VISIT: CPT | Mod: NTX,,, | Performed by: SURGERY

## 2021-05-06 PROCEDURE — 93990 DOPPLER FLOW TESTING: CPT | Mod: 26,S$PBB,TXP, | Performed by: SURGERY

## 2021-05-06 PROCEDURE — 99999 PR PBB SHADOW E&M-EST. PATIENT-LVL IV: CPT | Mod: PBBFAC,TXP,, | Performed by: SURGERY

## 2021-05-06 PROCEDURE — 99999 PR PBB SHADOW E&M-EST. PATIENT-LVL IV: ICD-10-PCS | Mod: PBBFAC,TXP,, | Performed by: SURGERY

## 2021-05-06 PROCEDURE — 93990 PR DUPLEX HEMODIALYSIS ACCESS: ICD-10-PCS | Mod: 26,S$PBB,TXP, | Performed by: SURGERY

## 2021-05-06 PROCEDURE — 99024 PR POST-OP FOLLOW-UP VISIT: ICD-10-PCS | Mod: NTX,,, | Performed by: SURGERY

## 2021-05-06 PROCEDURE — 96365 THER/PROPH/DIAG IV INF INIT: CPT | Mod: TXP

## 2021-05-06 PROCEDURE — 93990 DOPPLER FLOW TESTING: CPT | Mod: PBBFAC,NTX | Performed by: SURGERY

## 2021-05-06 PROCEDURE — 63600175 PHARM REV CODE 636 W HCPCS: Mod: TXP | Performed by: INTERNAL MEDICINE

## 2021-05-06 RX ORDER — DIPHENHYDRAMINE HYDROCHLORIDE 50 MG/ML
50 INJECTION INTRAMUSCULAR; INTRAVENOUS ONCE AS NEEDED
Status: CANCELLED | OUTPATIENT
Start: 2021-05-12

## 2021-05-06 RX ORDER — HEPARIN 100 UNIT/ML
500 SYRINGE INTRAVENOUS
Status: DISCONTINUED | OUTPATIENT
Start: 2021-05-06 | End: 2021-05-06 | Stop reason: HOSPADM

## 2021-05-06 RX ORDER — METHYLPREDNISOLONE SOD SUCC 125 MG
125 VIAL (EA) INJECTION ONCE AS NEEDED
Status: DISCONTINUED | OUTPATIENT
Start: 2021-05-06 | End: 2021-05-06 | Stop reason: HOSPADM

## 2021-05-06 RX ORDER — EPINEPHRINE 0.3 MG/.3ML
0.3 INJECTION SUBCUTANEOUS ONCE AS NEEDED
Status: CANCELLED | OUTPATIENT
Start: 2021-05-12

## 2021-05-06 RX ORDER — HEPARIN 100 UNIT/ML
500 SYRINGE INTRAVENOUS
Status: CANCELLED | OUTPATIENT
Start: 2021-05-12

## 2021-05-06 RX ORDER — METHYLPREDNISOLONE SOD SUCC 125 MG
125 VIAL (EA) INJECTION ONCE AS NEEDED
Status: CANCELLED | OUTPATIENT
Start: 2021-05-12

## 2021-05-06 RX ORDER — SODIUM CHLORIDE 0.9 % (FLUSH) 0.9 %
10 SYRINGE (ML) INJECTION
Status: DISCONTINUED | OUTPATIENT
Start: 2021-05-06 | End: 2021-05-06 | Stop reason: HOSPADM

## 2021-05-06 RX ORDER — EPINEPHRINE 0.3 MG/.3ML
0.3 INJECTION SUBCUTANEOUS ONCE AS NEEDED
Status: DISCONTINUED | OUTPATIENT
Start: 2021-05-06 | End: 2021-05-06 | Stop reason: HOSPADM

## 2021-05-06 RX ORDER — SODIUM CHLORIDE 0.9 % (FLUSH) 0.9 %
10 SYRINGE (ML) INJECTION
Status: CANCELLED | OUTPATIENT
Start: 2021-05-12

## 2021-05-06 RX ORDER — DIPHENHYDRAMINE HYDROCHLORIDE 50 MG/ML
50 INJECTION INTRAMUSCULAR; INTRAVENOUS ONCE AS NEEDED
Status: DISCONTINUED | OUTPATIENT
Start: 2021-05-06 | End: 2021-05-06 | Stop reason: HOSPADM

## 2021-05-06 RX ADMIN — IRON SUCROSE 100 MG: 20 INJECTION, SOLUTION INTRAVENOUS at 02:05

## 2021-05-07 ENCOUNTER — LAB VISIT (OUTPATIENT)
Dept: LAB | Facility: HOSPITAL | Age: 26
End: 2021-05-07
Attending: INTERNAL MEDICINE
Payer: MEDICAID

## 2021-05-07 ENCOUNTER — OFFICE VISIT (OUTPATIENT)
Dept: NEPHROLOGY | Facility: CLINIC | Age: 26
End: 2021-05-07
Payer: MEDICAID

## 2021-05-07 VITALS
DIASTOLIC BLOOD PRESSURE: 70 MMHG | WEIGHT: 181.19 LBS | HEIGHT: 65 IN | HEART RATE: 91 BPM | SYSTOLIC BLOOD PRESSURE: 120 MMHG | BODY MASS INDEX: 30.19 KG/M2 | OXYGEN SATURATION: 99 %

## 2021-05-07 DIAGNOSIS — I10 HYPERTENSION, UNSPECIFIED TYPE: ICD-10-CM

## 2021-05-07 DIAGNOSIS — N18.5 CKD (CHRONIC KIDNEY DISEASE) STAGE 5, GFR LESS THAN 15 ML/MIN: Primary | ICD-10-CM

## 2021-05-07 DIAGNOSIS — N18.5 CKD (CHRONIC KIDNEY DISEASE) STAGE 5, GFR LESS THAN 15 ML/MIN: ICD-10-CM

## 2021-05-07 LAB
ALBUMIN SERPL BCP-MCNC: 1.6 G/DL (ref 3.5–5.2)
ANION GAP SERPL CALC-SCNC: 15 MMOL/L (ref 8–16)
BASOPHILS # BLD AUTO: 0.05 K/UL (ref 0–0.2)
BASOPHILS NFR BLD: 0.6 % (ref 0–1.9)
BUN SERPL-MCNC: 51 MG/DL (ref 6–20)
CALCIUM SERPL-MCNC: 7.5 MG/DL (ref 8.7–10.5)
CHLORIDE SERPL-SCNC: 102 MMOL/L (ref 95–110)
CO2 SERPL-SCNC: 13 MMOL/L (ref 23–29)
CREAT SERPL-MCNC: 8.3 MG/DL (ref 0.5–1.4)
DIFFERENTIAL METHOD: ABNORMAL
EOSINOPHIL # BLD AUTO: 0.1 K/UL (ref 0–0.5)
EOSINOPHIL NFR BLD: 0.9 % (ref 0–8)
ERYTHROCYTE [DISTWIDTH] IN BLOOD BY AUTOMATED COUNT: 15.3 % (ref 11.5–14.5)
EST. GFR  (AFRICAN AMERICAN): 7 ML/MIN/1.73 M^2
EST. GFR  (NON AFRICAN AMERICAN): 6 ML/MIN/1.73 M^2
GLUCOSE SERPL-MCNC: 533 MG/DL (ref 70–110)
HCT VFR BLD AUTO: 24.3 % (ref 37–48.5)
HGB BLD-MCNC: 7.6 G/DL (ref 12–16)
IMM GRANULOCYTES # BLD AUTO: 0.05 K/UL (ref 0–0.04)
IMM GRANULOCYTES NFR BLD AUTO: 0.6 % (ref 0–0.5)
LYMPHOCYTES # BLD AUTO: 2.3 K/UL (ref 1–4.8)
LYMPHOCYTES NFR BLD: 29.5 % (ref 18–48)
MCH RBC QN AUTO: 28.6 PG (ref 27–31)
MCHC RBC AUTO-ENTMCNC: 31.3 G/DL (ref 32–36)
MCV RBC AUTO: 91 FL (ref 82–98)
MONOCYTES # BLD AUTO: 0.6 K/UL (ref 0.3–1)
MONOCYTES NFR BLD: 7.2 % (ref 4–15)
NEUTROPHILS # BLD AUTO: 4.7 K/UL (ref 1.8–7.7)
NEUTROPHILS NFR BLD: 61.2 % (ref 38–73)
NRBC BLD-RTO: 0 /100 WBC
PHOSPHATE SERPL-MCNC: 8.2 MG/DL (ref 2.7–4.5)
PLATELET # BLD AUTO: 380 K/UL (ref 150–450)
PMV BLD AUTO: 11.2 FL (ref 9.2–12.9)
POTASSIUM SERPL-SCNC: 4.9 MMOL/L (ref 3.5–5.1)
RBC # BLD AUTO: 2.66 M/UL (ref 4–5.4)
SODIUM SERPL-SCNC: 130 MMOL/L (ref 136–145)
WBC # BLD AUTO: 7.76 K/UL (ref 3.9–12.7)

## 2021-05-07 PROCEDURE — 36415 COLL VENOUS BLD VENIPUNCTURE: CPT | Mod: TXP | Performed by: INTERNAL MEDICINE

## 2021-05-07 PROCEDURE — 80069 RENAL FUNCTION PANEL: CPT | Mod: NTX | Performed by: INTERNAL MEDICINE

## 2021-05-07 PROCEDURE — 99215 PR OFFICE/OUTPT VISIT, EST, LEVL V, 40-54 MIN: ICD-10-PCS | Mod: S$PBB,,, | Performed by: INTERNAL MEDICINE

## 2021-05-07 PROCEDURE — 99999 PR PBB SHADOW E&M-EST. PATIENT-LVL IV: ICD-10-PCS | Mod: PBBFAC,,, | Performed by: INTERNAL MEDICINE

## 2021-05-07 PROCEDURE — 99215 OFFICE O/P EST HI 40 MIN: CPT | Mod: S$PBB,,, | Performed by: INTERNAL MEDICINE

## 2021-05-07 PROCEDURE — 99214 OFFICE O/P EST MOD 30 MIN: CPT | Mod: PBBFAC | Performed by: INTERNAL MEDICINE

## 2021-05-07 PROCEDURE — 99999 PR PBB SHADOW E&M-EST. PATIENT-LVL IV: CPT | Mod: PBBFAC,,, | Performed by: INTERNAL MEDICINE

## 2021-05-07 PROCEDURE — 85025 COMPLETE CBC W/AUTO DIFF WBC: CPT | Mod: TXP | Performed by: INTERNAL MEDICINE

## 2021-05-07 RX ORDER — HYDRALAZINE HYDROCHLORIDE 50 MG/1
50 TABLET, FILM COATED ORAL 2 TIMES DAILY
Qty: 180 TABLET | Refills: 3 | Status: ON HOLD | OUTPATIENT
Start: 2021-05-07 | End: 2021-05-26 | Stop reason: HOSPADM

## 2021-05-07 RX ORDER — TORSEMIDE 20 MG/1
60 TABLET ORAL 2 TIMES DAILY
Qty: 540 TABLET | Refills: 3 | Status: ON HOLD | OUTPATIENT
Start: 2021-05-07 | End: 2021-05-26 | Stop reason: HOSPADM

## 2021-05-07 RX ORDER — METOLAZONE 2.5 MG/1
2.5 TABLET ORAL DAILY
Qty: 90 TABLET | Refills: 0 | Status: ON HOLD | OUTPATIENT
Start: 2021-05-07 | End: 2021-05-26 | Stop reason: HOSPADM

## 2021-05-09 ENCOUNTER — TELEPHONE (OUTPATIENT)
Dept: NEPHROLOGY | Facility: CLINIC | Age: 26
End: 2021-05-09

## 2021-05-09 RX ORDER — SODIUM BICARBONATE 650 MG/1
1300 TABLET ORAL 3 TIMES DAILY
Qty: 30 TABLET | Refills: 11 | Status: ON HOLD
Start: 2021-05-09 | End: 2021-05-26 | Stop reason: HOSPADM

## 2021-05-10 ENCOUNTER — TELEPHONE (OUTPATIENT)
Dept: TRANSPLANT | Facility: CLINIC | Age: 26
End: 2021-05-10

## 2021-05-10 ENCOUNTER — TELEPHONE (OUTPATIENT)
Dept: ENDOCRINOLOGY | Facility: CLINIC | Age: 26
End: 2021-05-10

## 2021-05-10 ENCOUNTER — TELEPHONE (OUTPATIENT)
Dept: PHARMACY | Facility: CLINIC | Age: 26
End: 2021-05-10

## 2021-05-10 ENCOUNTER — PATIENT MESSAGE (OUTPATIENT)
Dept: NEPHROLOGY | Facility: CLINIC | Age: 26
End: 2021-05-10

## 2021-05-10 DIAGNOSIS — E10.22 TYPE 1 DIABETES MELLITUS WITH STAGE 5 CHRONIC KIDNEY DISEASE NOT ON CHRONIC DIALYSIS: Primary | ICD-10-CM

## 2021-05-10 DIAGNOSIS — N18.5 TYPE 1 DIABETES MELLITUS WITH STAGE 5 CHRONIC KIDNEY DISEASE NOT ON CHRONIC DIALYSIS: Primary | ICD-10-CM

## 2021-05-10 RX ORDER — BLOOD-GLUCOSE SENSOR
3 EACH MISCELLANEOUS CONTINUOUS PRN
Qty: 3 EACH | Status: SHIPPED | OUTPATIENT
Start: 2021-05-10 | End: 2021-10-29

## 2021-05-10 RX ORDER — BLOOD-GLUCOSE TRANSMITTER
1 EACH MISCELLANEOUS CONTINUOUS PRN
Qty: 1 EACH | Status: SHIPPED | OUTPATIENT
Start: 2021-05-10 | End: 2021-10-29

## 2021-05-10 RX ORDER — BLOOD-GLUCOSE,RECEIVER,CONT
1 EACH MISCELLANEOUS CONTINUOUS PRN
Qty: 1 EACH | Status: SHIPPED | OUTPATIENT
Start: 2021-05-10 | End: 2021-10-29 | Stop reason: ALTCHOICE

## 2021-05-11 RX ORDER — EPINEPHRINE 0.3 MG/.3ML
0.3 INJECTION SUBCUTANEOUS ONCE AS NEEDED
Status: CANCELLED | OUTPATIENT
Start: 2021-05-11

## 2021-05-11 RX ORDER — HEPARIN 100 UNIT/ML
500 SYRINGE INTRAVENOUS
Status: CANCELLED | OUTPATIENT
Start: 2021-05-11

## 2021-05-11 RX ORDER — SODIUM CHLORIDE 0.9 % (FLUSH) 0.9 %
10 SYRINGE (ML) INJECTION
Status: CANCELLED | OUTPATIENT
Start: 2021-05-11

## 2021-05-11 RX ORDER — METHYLPREDNISOLONE SOD SUCC 125 MG
125 VIAL (EA) INJECTION ONCE AS NEEDED
Status: CANCELLED | OUTPATIENT
Start: 2021-05-11

## 2021-05-11 RX ORDER — DIPHENHYDRAMINE HYDROCHLORIDE 50 MG/ML
50 INJECTION INTRAMUSCULAR; INTRAVENOUS ONCE AS NEEDED
Status: CANCELLED | OUTPATIENT
Start: 2021-05-11

## 2021-05-12 ENCOUNTER — INFUSION (OUTPATIENT)
Dept: INFECTIOUS DISEASES | Facility: HOSPITAL | Age: 26
End: 2021-05-12
Attending: INTERNAL MEDICINE
Payer: MEDICAID

## 2021-05-12 VITALS
HEIGHT: 65 IN | HEART RATE: 92 BPM | WEIGHT: 174.06 LBS | BODY MASS INDEX: 29 KG/M2 | TEMPERATURE: 98 F | RESPIRATION RATE: 18 BRPM | OXYGEN SATURATION: 100 % | SYSTOLIC BLOOD PRESSURE: 182 MMHG | DIASTOLIC BLOOD PRESSURE: 95 MMHG

## 2021-05-12 DIAGNOSIS — N18.5 CKD (CHRONIC KIDNEY DISEASE) STAGE 5, GFR LESS THAN 15 ML/MIN: Primary | ICD-10-CM

## 2021-05-12 DIAGNOSIS — D50.8 OTHER IRON DEFICIENCY ANEMIA: ICD-10-CM

## 2021-05-12 DIAGNOSIS — N18.4 ANEMIA DUE TO STAGE 4 CHRONIC KIDNEY DISEASE: ICD-10-CM

## 2021-05-12 DIAGNOSIS — D63.1 ANEMIA DUE TO STAGE 4 CHRONIC KIDNEY DISEASE: ICD-10-CM

## 2021-05-12 DIAGNOSIS — O99.012 ANEMIA DURING PREGNANCY IN SECOND TRIMESTER: ICD-10-CM

## 2021-05-12 PROCEDURE — 96365 THER/PROPH/DIAG IV INF INIT: CPT | Mod: TXP

## 2021-05-12 PROCEDURE — 63600175 PHARM REV CODE 636 W HCPCS: Mod: TXP | Performed by: INTERNAL MEDICINE

## 2021-05-12 PROCEDURE — 25000003 PHARM REV CODE 250: Mod: TXP | Performed by: INTERNAL MEDICINE

## 2021-05-12 RX ORDER — EPINEPHRINE 0.3 MG/.3ML
0.3 INJECTION SUBCUTANEOUS ONCE AS NEEDED
Status: DISCONTINUED | OUTPATIENT
Start: 2021-05-12 | End: 2021-05-12 | Stop reason: HOSPADM

## 2021-05-12 RX ORDER — EPINEPHRINE 0.3 MG/.3ML
0.3 INJECTION SUBCUTANEOUS ONCE AS NEEDED
Status: CANCELLED | OUTPATIENT
Start: 2021-05-19

## 2021-05-12 RX ORDER — METHYLPREDNISOLONE SOD SUCC 125 MG
125 VIAL (EA) INJECTION ONCE AS NEEDED
Status: CANCELLED | OUTPATIENT
Start: 2021-05-19

## 2021-05-12 RX ORDER — DIPHENHYDRAMINE HYDROCHLORIDE 50 MG/ML
50 INJECTION INTRAMUSCULAR; INTRAVENOUS ONCE AS NEEDED
Status: DISCONTINUED | OUTPATIENT
Start: 2021-05-12 | End: 2021-05-12 | Stop reason: HOSPADM

## 2021-05-12 RX ORDER — HEPARIN 100 UNIT/ML
500 SYRINGE INTRAVENOUS
Status: CANCELLED | OUTPATIENT
Start: 2021-05-19

## 2021-05-12 RX ORDER — SODIUM CHLORIDE 0.9 % (FLUSH) 0.9 %
10 SYRINGE (ML) INJECTION
Status: CANCELLED | OUTPATIENT
Start: 2021-05-19

## 2021-05-12 RX ORDER — METHYLPREDNISOLONE SOD SUCC 125 MG
125 VIAL (EA) INJECTION ONCE AS NEEDED
Status: DISCONTINUED | OUTPATIENT
Start: 2021-05-12 | End: 2021-05-12 | Stop reason: HOSPADM

## 2021-05-12 RX ORDER — HEPARIN 100 UNIT/ML
500 SYRINGE INTRAVENOUS
Status: DISCONTINUED | OUTPATIENT
Start: 2021-05-12 | End: 2021-05-12 | Stop reason: HOSPADM

## 2021-05-12 RX ORDER — DIPHENHYDRAMINE HYDROCHLORIDE 50 MG/ML
50 INJECTION INTRAMUSCULAR; INTRAVENOUS ONCE AS NEEDED
Status: CANCELLED | OUTPATIENT
Start: 2021-05-19

## 2021-05-12 RX ORDER — SODIUM CHLORIDE 0.9 % (FLUSH) 0.9 %
10 SYRINGE (ML) INJECTION
Status: DISCONTINUED | OUTPATIENT
Start: 2021-05-12 | End: 2021-05-12 | Stop reason: HOSPADM

## 2021-05-12 RX ADMIN — IRON SUCROSE 100 MG: 20 INJECTION, SOLUTION INTRAVENOUS at 03:05

## 2021-05-17 ENCOUNTER — TELEPHONE (OUTPATIENT)
Dept: TRANSPLANT | Facility: CLINIC | Age: 26
End: 2021-05-17

## 2021-05-18 ENCOUNTER — HOSPITAL ENCOUNTER (OUTPATIENT)
Dept: RADIOLOGY | Facility: HOSPITAL | Age: 26
Discharge: HOME | End: 2021-05-18
Attending: INTERNAL MEDICINE
Payer: MEDICAID

## 2021-05-18 DIAGNOSIS — R16.0 HEPATOMEGALY: ICD-10-CM

## 2021-05-18 PROCEDURE — 76391 MR ELASTOGRAPHY: ICD-10-PCS | Mod: 26,TXP,, | Performed by: RADIOLOGY

## 2021-05-18 PROCEDURE — 76391 MR ELASTOGRAPHY: CPT | Mod: 26,TXP,, | Performed by: RADIOLOGY

## 2021-05-18 PROCEDURE — 76391 MR ELASTOGRAPHY: CPT | Mod: TC,TXP

## 2021-05-19 ENCOUNTER — OFFICE VISIT (OUTPATIENT)
Dept: NEPHROLOGY | Facility: CLINIC | Age: 26
DRG: 699 | End: 2021-05-19
Payer: MEDICAID

## 2021-05-19 ENCOUNTER — TELEPHONE (OUTPATIENT)
Dept: HEPATOLOGY | Facility: CLINIC | Age: 26
End: 2021-05-19

## 2021-05-19 ENCOUNTER — HOSPITAL ENCOUNTER (INPATIENT)
Facility: HOSPITAL | Age: 26
LOS: 9 days | Discharge: HOME | DRG: 699 | End: 2021-05-28
Attending: EMERGENCY MEDICINE | Admitting: HOSPITALIST
Payer: MEDICAID

## 2021-05-19 ENCOUNTER — TELEPHONE (OUTPATIENT)
Dept: NEPHROLOGY | Facility: HOSPITAL | Age: 26
End: 2021-05-19

## 2021-05-19 VITALS
WEIGHT: 176.56 LBS | BODY MASS INDEX: 29.42 KG/M2 | DIASTOLIC BLOOD PRESSURE: 84 MMHG | HEIGHT: 65 IN | SYSTOLIC BLOOD PRESSURE: 130 MMHG | HEART RATE: 101 BPM | OXYGEN SATURATION: 100 %

## 2021-05-19 DIAGNOSIS — D64.9 LOW HEMOGLOBIN: ICD-10-CM

## 2021-05-19 DIAGNOSIS — N18.9 ACUTE KIDNEY INJURY SUPERIMPOSED ON CHRONIC KIDNEY DISEASE: ICD-10-CM

## 2021-05-19 DIAGNOSIS — N18.4 CKD (CHRONIC KIDNEY DISEASE) STAGE 4, GFR 15-29 ML/MIN: ICD-10-CM

## 2021-05-19 DIAGNOSIS — N19 UREMIA: ICD-10-CM

## 2021-05-19 DIAGNOSIS — N18.5 CKD (CHRONIC KIDNEY DISEASE) STAGE 5, GFR LESS THAN 15 ML/MIN: Primary | ICD-10-CM

## 2021-05-19 DIAGNOSIS — E87.5 HYPERKALEMIA: ICD-10-CM

## 2021-05-19 DIAGNOSIS — E87.70 VOLUME OVERLOAD: ICD-10-CM

## 2021-05-19 DIAGNOSIS — R73.9 HYPERGLYCEMIA: ICD-10-CM

## 2021-05-19 DIAGNOSIS — N19 RENAL FAILURE, UNSPECIFIED CHRONICITY: ICD-10-CM

## 2021-05-19 DIAGNOSIS — N18.6 ESRD (END STAGE RENAL DISEASE): ICD-10-CM

## 2021-05-19 DIAGNOSIS — E11.649 HYPOGLYCEMIA ASSOCIATED WITH DIABETES: Primary | ICD-10-CM

## 2021-05-19 DIAGNOSIS — E10.22 TYPE 1 DIABETES MELLITUS WITH STAGE 5 CHRONIC KIDNEY DISEASE NOT ON CHRONIC DIALYSIS: ICD-10-CM

## 2021-05-19 DIAGNOSIS — R07.9 CHEST PAIN: ICD-10-CM

## 2021-05-19 DIAGNOSIS — E87.70 HYPERVOLEMIA, UNSPECIFIED HYPERVOLEMIA TYPE: ICD-10-CM

## 2021-05-19 DIAGNOSIS — N17.9 ACUTE KIDNEY INJURY SUPERIMPOSED ON CHRONIC KIDNEY DISEASE: ICD-10-CM

## 2021-05-19 DIAGNOSIS — N18.5 TYPE 1 DIABETES MELLITUS WITH STAGE 5 CHRONIC KIDNEY DISEASE NOT ON CHRONIC DIALYSIS: ICD-10-CM

## 2021-05-19 DIAGNOSIS — I10 HYPERTENSION, ESSENTIAL: ICD-10-CM

## 2021-05-19 DIAGNOSIS — E10.65 HYPERGLYCEMIA DUE TO TYPE 1 DIABETES MELLITUS: ICD-10-CM

## 2021-05-19 DIAGNOSIS — E16.2 HYPOGLYCEMIA: ICD-10-CM

## 2021-05-19 LAB
ALBUMIN SERPL BCP-MCNC: 1.6 G/DL (ref 3.5–5.2)
ALBUMIN SERPL BCP-MCNC: 1.7 G/DL (ref 3.5–5.2)
ALP SERPL-CCNC: 100 U/L (ref 55–135)
ALP SERPL-CCNC: 105 U/L (ref 55–135)
ALT SERPL W/O P-5'-P-CCNC: 16 U/L (ref 10–44)
ALT SERPL W/O P-5'-P-CCNC: 19 U/L (ref 10–44)
ANION GAP SERPL CALC-SCNC: 12 MMOL/L (ref 8–16)
ANION GAP SERPL CALC-SCNC: 13 MMOL/L (ref 8–16)
AST SERPL-CCNC: 22 U/L (ref 10–40)
AST SERPL-CCNC: 27 U/L (ref 10–40)
B-HCG UR QL: NEGATIVE
BACTERIA #/AREA URNS AUTO: ABNORMAL /HPF
BASOPHILS # BLD AUTO: 0.02 K/UL (ref 0–0.2)
BASOPHILS NFR BLD: 0.3 % (ref 0–1.9)
BILIRUB SERPL-MCNC: 0.1 MG/DL (ref 0.1–1)
BILIRUB SERPL-MCNC: 0.1 MG/DL (ref 0.1–1)
BILIRUB UR QL STRIP: NEGATIVE
BUN SERPL-MCNC: 58 MG/DL (ref 6–30)
BUN SERPL-MCNC: 64 MG/DL (ref 6–20)
BUN SERPL-MCNC: 64 MG/DL (ref 6–20)
CALCIUM SERPL-MCNC: 7.6 MG/DL (ref 8.7–10.5)
CALCIUM SERPL-MCNC: 7.8 MG/DL (ref 8.7–10.5)
CHLORIDE SERPL-SCNC: 109 MMOL/L (ref 95–110)
CHLORIDE SERPL-SCNC: 110 MMOL/L (ref 95–110)
CHLORIDE SERPL-SCNC: 114 MMOL/L (ref 95–110)
CLARITY UR REFRACT.AUTO: ABNORMAL
CO2 SERPL-SCNC: 16 MMOL/L (ref 23–29)
CO2 SERPL-SCNC: 16 MMOL/L (ref 23–29)
COLOR UR AUTO: YELLOW
CREAT SERPL-MCNC: 10.1 MG/DL (ref 0.5–1.4)
CREAT SERPL-MCNC: 10.2 MG/DL (ref 0.5–1.4)
CREAT SERPL-MCNC: 11.4 MG/DL (ref 0.5–1.4)
CTP QC/QA: YES
CTP QC/QA: YES
DIFFERENTIAL METHOD: ABNORMAL
EOSINOPHIL # BLD AUTO: 0.2 K/UL (ref 0–0.5)
EOSINOPHIL NFR BLD: 2.3 % (ref 0–8)
ERYTHROCYTE [DISTWIDTH] IN BLOOD BY AUTOMATED COUNT: 16.6 % (ref 11.5–14.5)
EST. GFR  (AFRICAN AMERICAN): 5.4 ML/MIN/1.73 M^2
EST. GFR  (AFRICAN AMERICAN): 5.5 ML/MIN/1.73 M^2
EST. GFR  (NON AFRICAN AMERICAN): 4.7 ML/MIN/1.73 M^2
EST. GFR  (NON AFRICAN AMERICAN): 4.8 ML/MIN/1.73 M^2
GLUCOSE SERPL-MCNC: 47 MG/DL (ref 70–110)
GLUCOSE SERPL-MCNC: 65 MG/DL (ref 70–110)
GLUCOSE SERPL-MCNC: 66 MG/DL (ref 70–110)
GLUCOSE UR QL STRIP: ABNORMAL
HCT VFR BLD AUTO: 20.5 % (ref 37–48.5)
HCT VFR BLD CALC: 19 %PCV (ref 36–54)
HGB BLD-MCNC: 6.4 G/DL (ref 12–16)
HGB UR QL STRIP: NEGATIVE
HYALINE CASTS UR QL AUTO: 0 /LPF
IMM GRANULOCYTES # BLD AUTO: 0.05 K/UL (ref 0–0.04)
IMM GRANULOCYTES NFR BLD AUTO: 0.8 % (ref 0–0.5)
KETONES UR QL STRIP: NEGATIVE
LACTATE SERPL-SCNC: 1.3 MMOL/L (ref 0.5–2.2)
LEUKOCYTE ESTERASE UR QL STRIP: ABNORMAL
LYMPHOCYTES # BLD AUTO: 2.7 K/UL (ref 1–4.8)
LYMPHOCYTES NFR BLD: 42.3 % (ref 18–48)
MCH RBC QN AUTO: 29.6 PG (ref 27–31)
MCHC RBC AUTO-ENTMCNC: 31.2 G/DL (ref 32–36)
MCV RBC AUTO: 95 FL (ref 82–98)
MICROSCOPIC COMMENT: ABNORMAL
MONOCYTES # BLD AUTO: 0.7 K/UL (ref 0.3–1)
MONOCYTES NFR BLD: 10.9 % (ref 4–15)
NEUTROPHILS # BLD AUTO: 2.8 K/UL (ref 1.8–7.7)
NEUTROPHILS NFR BLD: 43.4 % (ref 38–73)
NITRITE UR QL STRIP: NEGATIVE
NRBC BLD-RTO: 0 /100 WBC
PH UR STRIP: 7 [PH] (ref 5–8)
PLATELET # BLD AUTO: 357 K/UL (ref 150–450)
PMV BLD AUTO: 11.2 FL (ref 9.2–12.9)
POC IONIZED CALCIUM: 0.97 MMOL/L (ref 1.06–1.42)
POC TCO2 (MEASURED): 18 MMOL/L (ref 23–29)
POCT GLUCOSE: 122 MG/DL (ref 70–110)
POCT GLUCOSE: 151 MG/DL (ref 70–110)
POCT GLUCOSE: 210 MG/DL (ref 70–110)
POCT GLUCOSE: 55 MG/DL (ref 70–110)
POCT GLUCOSE: 66 MG/DL (ref 70–110)
POCT GLUCOSE: 75 MG/DL (ref 70–110)
POTASSIUM BLD-SCNC: 5.4 MMOL/L (ref 3.5–5.1)
POTASSIUM SERPL-SCNC: 5.5 MMOL/L (ref 3.5–5.1)
POTASSIUM SERPL-SCNC: 5.8 MMOL/L (ref 3.5–5.1)
PROT SERPL-MCNC: 5.7 G/DL (ref 6–8.4)
PROT SERPL-MCNC: 5.8 G/DL (ref 6–8.4)
PROT UR QL STRIP: ABNORMAL
RBC # BLD AUTO: 2.16 M/UL (ref 4–5.4)
RBC #/AREA URNS AUTO: 21 /HPF (ref 0–4)
SAMPLE: ABNORMAL
SARS-COV-2 RDRP RESP QL NAA+PROBE: NEGATIVE
SODIUM BLD-SCNC: 136 MMOL/L (ref 136–145)
SODIUM SERPL-SCNC: 138 MMOL/L (ref 136–145)
SODIUM SERPL-SCNC: 138 MMOL/L (ref 136–145)
SP GR UR STRIP: 1.01 (ref 1–1.03)
SQUAMOUS #/AREA URNS AUTO: 14 /HPF
URN SPEC COLLECT METH UR: ABNORMAL
WBC # BLD AUTO: 6.45 K/UL (ref 3.9–12.7)
WBC #/AREA URNS AUTO: 52 /HPF (ref 0–5)
YEAST UR QL AUTO: ABNORMAL

## 2021-05-19 PROCEDURE — 99285 EMERGENCY DEPT VISIT HI MDM: CPT | Mod: NTX,CS,, | Performed by: EMERGENCY MEDICINE

## 2021-05-19 PROCEDURE — 99285 PR EMERGENCY DEPT VISIT,LEVEL V: ICD-10-PCS | Mod: NTX,CS,, | Performed by: EMERGENCY MEDICINE

## 2021-05-19 PROCEDURE — 99214 PR OFFICE/OUTPT VISIT, EST, LEVL IV, 30-39 MIN: ICD-10-PCS | Mod: S$PBB,,, | Performed by: INTERNAL MEDICINE

## 2021-05-19 PROCEDURE — 93010 ELECTROCARDIOGRAM REPORT: CPT | Mod: NTX,,, | Performed by: INTERNAL MEDICINE

## 2021-05-19 PROCEDURE — 87086 URINE CULTURE/COLONY COUNT: CPT | Mod: NTX | Performed by: INTERNAL MEDICINE

## 2021-05-19 PROCEDURE — 80053 COMPREHEN METABOLIC PANEL: CPT | Mod: 91,NTX | Performed by: INTERNAL MEDICINE

## 2021-05-19 PROCEDURE — 86920 COMPATIBILITY TEST SPIN: CPT | Mod: NTX | Performed by: STUDENT IN AN ORGANIZED HEALTH CARE EDUCATION/TRAINING PROGRAM

## 2021-05-19 PROCEDURE — 99214 OFFICE O/P EST MOD 30 MIN: CPT | Mod: S$PBB,,, | Performed by: INTERNAL MEDICINE

## 2021-05-19 PROCEDURE — 99213 OFFICE O/P EST LOW 20 MIN: CPT | Mod: PBBFAC,25,27,NTX | Performed by: INTERNAL MEDICINE

## 2021-05-19 PROCEDURE — 80053 COMPREHEN METABOLIC PANEL: CPT | Mod: NTX | Performed by: PHYSICIAN ASSISTANT

## 2021-05-19 PROCEDURE — U0002 COVID-19 LAB TEST NON-CDC: HCPCS | Mod: NTX | Performed by: PHYSICIAN ASSISTANT

## 2021-05-19 PROCEDURE — 83605 ASSAY OF LACTIC ACID: CPT | Mod: NTX | Performed by: INTERNAL MEDICINE

## 2021-05-19 PROCEDURE — 96360 HYDRATION IV INFUSION INIT: CPT | Mod: NTX

## 2021-05-19 PROCEDURE — 25000003 PHARM REV CODE 250: Mod: NTX | Performed by: EMERGENCY MEDICINE

## 2021-05-19 PROCEDURE — 12000002 HC ACUTE/MED SURGE SEMI-PRIVATE ROOM: Mod: NTX

## 2021-05-19 PROCEDURE — 20600001 HC STEP DOWN PRIVATE ROOM: Mod: NTX

## 2021-05-19 PROCEDURE — 63600175 PHARM REV CODE 636 W HCPCS: Mod: NTX | Performed by: EMERGENCY MEDICINE

## 2021-05-19 PROCEDURE — 99999 PR PBB SHADOW E&M-EST. PATIENT-LVL III: ICD-10-PCS | Mod: PBBFAC,,, | Performed by: INTERNAL MEDICINE

## 2021-05-19 PROCEDURE — 93005 ELECTROCARDIOGRAM TRACING: CPT | Mod: NTX

## 2021-05-19 PROCEDURE — 99285 EMERGENCY DEPT VISIT HI MDM: CPT | Mod: 25,NTX

## 2021-05-19 PROCEDURE — 82962 GLUCOSE BLOOD TEST: CPT | Mod: 59,NTX

## 2021-05-19 PROCEDURE — 93010 EKG 12-LEAD: ICD-10-PCS | Mod: NTX,,, | Performed by: INTERNAL MEDICINE

## 2021-05-19 PROCEDURE — 81025 URINE PREGNANCY TEST: CPT | Mod: NTX | Performed by: INTERNAL MEDICINE

## 2021-05-19 PROCEDURE — 96361 HYDRATE IV INFUSION ADD-ON: CPT | Mod: NTX

## 2021-05-19 PROCEDURE — 86900 BLOOD TYPING SEROLOGIC ABO: CPT | Mod: NTX | Performed by: STUDENT IN AN ORGANIZED HEALTH CARE EDUCATION/TRAINING PROGRAM

## 2021-05-19 PROCEDURE — 99999 PR PBB SHADOW E&M-EST. PATIENT-LVL III: CPT | Mod: PBBFAC,,, | Performed by: INTERNAL MEDICINE

## 2021-05-19 PROCEDURE — 81001 URINALYSIS AUTO W/SCOPE: CPT | Mod: NTX | Performed by: INTERNAL MEDICINE

## 2021-05-19 PROCEDURE — 36430 TRANSFUSION BLD/BLD COMPNT: CPT | Mod: NTX

## 2021-05-19 PROCEDURE — 85025 COMPLETE CBC W/AUTO DIFF WBC: CPT | Mod: 91,NTX | Performed by: PHYSICIAN ASSISTANT

## 2021-05-19 PROCEDURE — 96374 THER/PROPH/DIAG INJ IV PUSH: CPT | Mod: NTX

## 2021-05-19 RX ORDER — IBUPROFEN 200 MG
24 TABLET ORAL
Status: DISCONTINUED | OUTPATIENT
Start: 2021-05-19 | End: 2021-05-28 | Stop reason: HOSPADM

## 2021-05-19 RX ORDER — ESCITALOPRAM OXALATE 20 MG/1
20 TABLET ORAL DAILY
Status: DISCONTINUED | OUTPATIENT
Start: 2021-05-20 | End: 2021-05-28 | Stop reason: HOSPADM

## 2021-05-19 RX ORDER — CALCITRIOL 0.25 UG/1
0.5 CAPSULE ORAL DAILY
Status: DISCONTINUED | OUTPATIENT
Start: 2021-05-20 | End: 2021-05-28 | Stop reason: HOSPADM

## 2021-05-19 RX ORDER — DEXTROSE 50 % IN WATER (D50W) INTRAVENOUS SYRINGE
25
Status: COMPLETED | OUTPATIENT
Start: 2021-05-19 | End: 2021-05-19

## 2021-05-19 RX ORDER — SODIUM BICARBONATE 650 MG/1
1300 TABLET ORAL 3 TIMES DAILY
Status: DISCONTINUED | OUTPATIENT
Start: 2021-05-20 | End: 2021-05-24

## 2021-05-19 RX ORDER — DEXTROSE MONOHYDRATE AND SODIUM CHLORIDE 5; .9 G/100ML; G/100ML
INJECTION, SOLUTION INTRAVENOUS
Status: COMPLETED | OUTPATIENT
Start: 2021-05-19 | End: 2021-05-19

## 2021-05-19 RX ORDER — DEXTROSE 50 % IN WATER (D50W) INTRAVENOUS SYRINGE
25
Status: DISCONTINUED | OUTPATIENT
Start: 2021-05-19 | End: 2021-05-19

## 2021-05-19 RX ORDER — FERROUS SULFATE 325(65) MG
325 TABLET, DELAYED RELEASE (ENTERIC COATED) ORAL 2 TIMES DAILY
Status: DISCONTINUED | OUTPATIENT
Start: 2021-05-19 | End: 2021-05-28 | Stop reason: HOSPADM

## 2021-05-19 RX ORDER — HEPARIN SODIUM 5000 [USP'U]/ML
5000 INJECTION, SOLUTION INTRAVENOUS; SUBCUTANEOUS EVERY 8 HOURS
Status: DISCONTINUED | OUTPATIENT
Start: 2021-05-20 | End: 2021-05-28 | Stop reason: HOSPADM

## 2021-05-19 RX ORDER — FUROSEMIDE 10 MG/ML
120 INJECTION INTRAMUSCULAR; INTRAVENOUS
Status: DISCONTINUED | OUTPATIENT
Start: 2021-05-19 | End: 2021-05-20

## 2021-05-19 RX ORDER — HYDROCODONE BITARTRATE AND ACETAMINOPHEN 500; 5 MG/1; MG/1
TABLET ORAL
Status: DISCONTINUED | OUTPATIENT
Start: 2021-05-19 | End: 2021-05-24

## 2021-05-19 RX ORDER — METOLAZONE 2.5 MG/1
2.5 TABLET ORAL DAILY
Status: DISCONTINUED | OUTPATIENT
Start: 2021-05-20 | End: 2021-05-19

## 2021-05-19 RX ORDER — SEVELAMER CARBONATE 800 MG/1
1600 TABLET, FILM COATED ORAL
Status: DISCONTINUED | OUTPATIENT
Start: 2021-05-20 | End: 2021-05-28 | Stop reason: HOSPADM

## 2021-05-19 RX ORDER — NIFEDIPINE 60 MG/1
60 TABLET, EXTENDED RELEASE ORAL 2 TIMES DAILY
Status: DISCONTINUED | OUTPATIENT
Start: 2021-05-19 | End: 2021-05-20

## 2021-05-19 RX ORDER — SODIUM CHLORIDE 0.9 % (FLUSH) 0.9 %
10 SYRINGE (ML) INJECTION
Status: DISCONTINUED | OUTPATIENT
Start: 2021-05-19 | End: 2021-05-28 | Stop reason: HOSPADM

## 2021-05-19 RX ORDER — ASPIRIN 81 MG/1
81 TABLET ORAL NIGHTLY
Status: DISCONTINUED | OUTPATIENT
Start: 2021-05-19 | End: 2021-05-28 | Stop reason: HOSPADM

## 2021-05-19 RX ORDER — CARVEDILOL 25 MG/1
25 TABLET ORAL 2 TIMES DAILY
Status: DISCONTINUED | OUTPATIENT
Start: 2021-05-19 | End: 2021-05-20

## 2021-05-19 RX ORDER — GLUCAGON 1 MG
1 KIT INJECTION
Status: DISCONTINUED | OUTPATIENT
Start: 2021-05-19 | End: 2021-05-28 | Stop reason: HOSPADM

## 2021-05-19 RX ORDER — TALC
6 POWDER (GRAM) TOPICAL NIGHTLY PRN
Status: DISCONTINUED | OUTPATIENT
Start: 2021-05-19 | End: 2021-05-28 | Stop reason: HOSPADM

## 2021-05-19 RX ORDER — SODIUM CHLORIDE 0.9 % (FLUSH) 0.9 %
10 SYRINGE (ML) INJECTION
Status: DISCONTINUED | OUTPATIENT
Start: 2021-05-19 | End: 2021-05-21

## 2021-05-19 RX ORDER — HYDRALAZINE HYDROCHLORIDE 50 MG/1
50 TABLET, FILM COATED ORAL 2 TIMES DAILY
Status: DISCONTINUED | OUTPATIENT
Start: 2021-05-19 | End: 2021-05-20

## 2021-05-19 RX ORDER — FUROSEMIDE 10 MG/ML
120 INJECTION INTRAMUSCULAR; INTRAVENOUS DAILY
Status: DISCONTINUED | OUTPATIENT
Start: 2021-05-20 | End: 2021-05-20

## 2021-05-19 RX ORDER — IBUPROFEN 200 MG
16 TABLET ORAL
Status: DISCONTINUED | OUTPATIENT
Start: 2021-05-19 | End: 2021-05-28 | Stop reason: HOSPADM

## 2021-05-19 RX ADMIN — DEXTROSE MONOHYDRATE 25 G: 25 INJECTION, SOLUTION INTRAVENOUS at 08:05

## 2021-05-19 RX ADMIN — DEXTROSE AND SODIUM CHLORIDE 100 ML/HR: 5; .9 INJECTION, SOLUTION INTRAVENOUS at 08:05

## 2021-05-20 PROBLEM — N19 UREMIA: Status: ACTIVE | Noted: 2020-07-29

## 2021-05-20 LAB
ABO + RH BLD: NORMAL
ALBUMIN SERPL BCP-MCNC: 1.5 G/DL (ref 3.5–5.2)
ALP SERPL-CCNC: 106 U/L (ref 55–135)
ALT SERPL W/O P-5'-P-CCNC: 40 U/L (ref 10–44)
ANION GAP SERPL CALC-SCNC: 11 MMOL/L (ref 8–16)
ANION GAP SERPL CALC-SCNC: 11 MMOL/L (ref 8–16)
ANION GAP SERPL CALC-SCNC: 12 MMOL/L (ref 8–16)
ANION GAP SERPL CALC-SCNC: 13 MMOL/L (ref 8–16)
ANION GAP SERPL CALC-SCNC: 15 MMOL/L (ref 8–16)
ASCENDING AORTA: 2.3 CM
AST SERPL-CCNC: 101 U/L (ref 10–40)
AV INDEX (PROSTH): 0.82
AV MEAN GRADIENT: 4 MMHG
AV PEAK GRADIENT: 8 MMHG
AV VALVE AREA: 2.31 CM2
AV VELOCITY RATIO: 0.84
BASOPHILS # BLD AUTO: 0.03 K/UL (ref 0–0.2)
BASOPHILS # BLD AUTO: 0.04 K/UL (ref 0–0.2)
BASOPHILS NFR BLD: 0.5 % (ref 0–1.9)
BASOPHILS NFR BLD: 0.6 % (ref 0–1.9)
BILIRUB SERPL-MCNC: 0.1 MG/DL (ref 0.1–1)
BLD GP AB SCN CELLS X3 SERPL QL: NORMAL
BLD PROD TYP BPU: NORMAL
BLOOD UNIT EXPIRATION DATE: NORMAL
BLOOD UNIT TYPE CODE: 5100
BLOOD UNIT TYPE: NORMAL
BNP SERPL-MCNC: 529 PG/ML (ref 0–99)
BSA FOR ECHO PROCEDURE: 1.92 M2
BUN SERPL-MCNC: 67 MG/DL (ref 6–20)
BUN SERPL-MCNC: 70 MG/DL (ref 6–20)
BUN SERPL-MCNC: 72 MG/DL (ref 6–20)
BUN SERPL-MCNC: 72 MG/DL (ref 6–20)
BUN SERPL-MCNC: 73 MG/DL (ref 6–20)
CALCIUM SERPL-MCNC: 7.3 MG/DL (ref 8.7–10.5)
CALCIUM SERPL-MCNC: 7.6 MG/DL (ref 8.7–10.5)
CALCIUM SERPL-MCNC: 7.7 MG/DL (ref 8.7–10.5)
CALCIUM SERPL-MCNC: 7.9 MG/DL (ref 8.7–10.5)
CALCIUM SERPL-MCNC: 8 MG/DL (ref 8.7–10.5)
CHLORIDE SERPL-SCNC: 108 MMOL/L (ref 95–110)
CHLORIDE SERPL-SCNC: 109 MMOL/L (ref 95–110)
CHLORIDE SERPL-SCNC: 109 MMOL/L (ref 95–110)
CHLORIDE SERPL-SCNC: 110 MMOL/L (ref 95–110)
CHLORIDE SERPL-SCNC: 110 MMOL/L (ref 95–110)
CO2 SERPL-SCNC: 14 MMOL/L (ref 23–29)
CO2 SERPL-SCNC: 15 MMOL/L (ref 23–29)
CO2 SERPL-SCNC: 15 MMOL/L (ref 23–29)
CO2 SERPL-SCNC: 16 MMOL/L (ref 23–29)
CO2 SERPL-SCNC: 16 MMOL/L (ref 23–29)
CODING SYSTEM: NORMAL
CREAT SERPL-MCNC: 10.1 MG/DL (ref 0.5–1.4)
CREAT SERPL-MCNC: 10.2 MG/DL (ref 0.5–1.4)
CREAT SERPL-MCNC: 10.3 MG/DL (ref 0.5–1.4)
CREAT SERPL-MCNC: 10.4 MG/DL (ref 0.5–1.4)
CREAT SERPL-MCNC: 9.9 MG/DL (ref 0.5–1.4)
CV ECHO LV RWT: 0.52 CM
DIFFERENTIAL METHOD: ABNORMAL
DIFFERENTIAL METHOD: ABNORMAL
DISPENSE STATUS: NORMAL
DOP CALC AO PEAK VEL: 1.39 M/S
DOP CALC AO VTI: 30.34 CM
DOP CALC LVOT AREA: 2.8 CM2
DOP CALC LVOT DIAMETER: 1.89 CM
DOP CALC LVOT PEAK VEL: 1.17 M/S
DOP CALC LVOT STROKE VOLUME: 70.05 CM3
DOP CALCLVOT PEAK VEL VTI: 24.98 CM
E WAVE DECELERATION TIME: 189.16 MSEC
E/A RATIO: 1.62
E/E' RATIO: 12 M/S
ECHO LV POSTERIOR WALL: 1.2 CM (ref 0.6–1.1)
EJECTION FRACTION: 65 %
EOSINOPHIL # BLD AUTO: 0.1 K/UL (ref 0–0.5)
EOSINOPHIL # BLD AUTO: 0.2 K/UL (ref 0–0.5)
EOSINOPHIL NFR BLD: 1.2 % (ref 0–8)
EOSINOPHIL NFR BLD: 2.7 % (ref 0–8)
ERYTHROCYTE [DISTWIDTH] IN BLOOD BY AUTOMATED COUNT: 16.1 % (ref 11.5–14.5)
ERYTHROCYTE [DISTWIDTH] IN BLOOD BY AUTOMATED COUNT: 16.8 % (ref 11.5–14.5)
EST. GFR  (AFRICAN AMERICAN): 5.3 ML/MIN/1.73 M^2
EST. GFR  (AFRICAN AMERICAN): 5.4 ML/MIN/1.73 M^2
EST. GFR  (AFRICAN AMERICAN): 5.4 ML/MIN/1.73 M^2
EST. GFR  (AFRICAN AMERICAN): 5.5 ML/MIN/1.73 M^2
EST. GFR  (AFRICAN AMERICAN): 5.6 ML/MIN/1.73 M^2
EST. GFR  (NON AFRICAN AMERICAN): 4.6 ML/MIN/1.73 M^2
EST. GFR  (NON AFRICAN AMERICAN): 4.6 ML/MIN/1.73 M^2
EST. GFR  (NON AFRICAN AMERICAN): 4.7 ML/MIN/1.73 M^2
EST. GFR  (NON AFRICAN AMERICAN): 4.8 ML/MIN/1.73 M^2
EST. GFR  (NON AFRICAN AMERICAN): 4.9 ML/MIN/1.73 M^2
FRACTIONAL SHORTENING: 48 % (ref 28–44)
GLUCOSE SERPL-MCNC: 202 MG/DL (ref 70–110)
GLUCOSE SERPL-MCNC: 215 MG/DL (ref 70–110)
GLUCOSE SERPL-MCNC: 222 MG/DL (ref 70–110)
GLUCOSE SERPL-MCNC: 320 MG/DL (ref 70–110)
GLUCOSE SERPL-MCNC: 341 MG/DL (ref 70–110)
HCT VFR BLD AUTO: 19.6 % (ref 37–48.5)
HCT VFR BLD AUTO: 24.6 % (ref 37–48.5)
HGB BLD-MCNC: 6 G/DL (ref 12–16)
HGB BLD-MCNC: 7.6 G/DL (ref 12–16)
IMM GRANULOCYTES # BLD AUTO: 0.06 K/UL (ref 0–0.04)
IMM GRANULOCYTES # BLD AUTO: 0.08 K/UL (ref 0–0.04)
IMM GRANULOCYTES NFR BLD AUTO: 0.9 % (ref 0–0.5)
IMM GRANULOCYTES NFR BLD AUTO: 1.3 % (ref 0–0.5)
INTERVENTRICULAR SEPTUM: 1.2 CM (ref 0.6–1.1)
LA MAJOR: 5.28 CM
LA MINOR: 5.1 CM
LA WIDTH: 4.39 CM
LEFT ATRIUM SIZE: 3.5 CM
LEFT ATRIUM VOLUME INDEX MOD: 31.2 ML/M2
LEFT ATRIUM VOLUME INDEX: 36.2 ML/M2
LEFT ATRIUM VOLUME MOD: 58.42 CM3
LEFT ATRIUM VOLUME: 67.76 CM3
LEFT INTERNAL DIMENSION IN SYSTOLE: 2.37 CM (ref 2.1–4)
LEFT VENTRICLE DIASTOLIC VOLUME INDEX: 42.82 ML/M2
LEFT VENTRICLE DIASTOLIC VOLUME: 80.07 ML
LEFT VENTRICLE MASS INDEX: 110 G/M2
LEFT VENTRICLE SYSTOLIC VOLUME INDEX: 10.5 ML/M2
LEFT VENTRICLE SYSTOLIC VOLUME: 19.62 ML
LEFT VENTRICULAR INTERNAL DIMENSION IN DIASTOLE: 4.6 CM (ref 3.5–6)
LEFT VENTRICULAR MASS: 204.99 G
LV LATERAL E/E' RATIO: 10.2 M/S
LV SEPTAL E/E' RATIO: 14.57 M/S
LYMPHOCYTES # BLD AUTO: 2.1 K/UL (ref 1–4.8)
LYMPHOCYTES # BLD AUTO: 2.4 K/UL (ref 1–4.8)
LYMPHOCYTES NFR BLD: 30.7 % (ref 18–48)
LYMPHOCYTES NFR BLD: 37.6 % (ref 18–48)
MAGNESIUM SERPL-MCNC: 2.3 MG/DL (ref 1.6–2.6)
MCH RBC QN AUTO: 29.1 PG (ref 27–31)
MCH RBC QN AUTO: 29.4 PG (ref 27–31)
MCHC RBC AUTO-ENTMCNC: 30.6 G/DL (ref 32–36)
MCHC RBC AUTO-ENTMCNC: 30.9 G/DL (ref 32–36)
MCV RBC AUTO: 94 FL (ref 82–98)
MCV RBC AUTO: 96 FL (ref 82–98)
MONOCYTES # BLD AUTO: 0.6 K/UL (ref 0.3–1)
MONOCYTES # BLD AUTO: 0.8 K/UL (ref 0.3–1)
MONOCYTES NFR BLD: 11.8 % (ref 4–15)
MONOCYTES NFR BLD: 9.4 % (ref 4–15)
MV A" WAVE DURATION": 9.13 MSEC
MV PEAK A VEL: 0.63 M/S
MV PEAK E VEL: 1.02 M/S
MV STENOSIS PRESSURE HALF TIME: 54.86 MS
MV VALVE AREA P 1/2 METHOD: 4.01 CM2
NEUTROPHILS # BLD AUTO: 3.1 K/UL (ref 1.8–7.7)
NEUTROPHILS # BLD AUTO: 3.7 K/UL (ref 1.8–7.7)
NEUTROPHILS NFR BLD: 48.5 % (ref 38–73)
NEUTROPHILS NFR BLD: 54.8 % (ref 38–73)
NRBC BLD-RTO: 0 /100 WBC
NRBC BLD-RTO: 0 /100 WBC
NUM UNITS TRANS PACKED RBC: NORMAL
PHOSPHATE SERPL-MCNC: 11.3 MG/DL (ref 2.7–4.5)
PISA TR MAX VEL: 2.71 M/S
PLATELET # BLD AUTO: 310 K/UL (ref 150–450)
PLATELET # BLD AUTO: 320 K/UL (ref 150–450)
PMV BLD AUTO: 11 FL (ref 9.2–12.9)
PMV BLD AUTO: 11.3 FL (ref 9.2–12.9)
POCT GLUCOSE: 199 MG/DL (ref 70–110)
POCT GLUCOSE: 206 MG/DL (ref 70–110)
POCT GLUCOSE: 215 MG/DL (ref 70–110)
POCT GLUCOSE: 218 MG/DL (ref 70–110)
POCT GLUCOSE: 240 MG/DL (ref 70–110)
POCT GLUCOSE: 248 MG/DL (ref 70–110)
POCT GLUCOSE: 300 MG/DL (ref 70–110)
POCT GLUCOSE: 301 MG/DL (ref 70–110)
POCT GLUCOSE: 356 MG/DL (ref 70–110)
POCT GLUCOSE: 373 MG/DL (ref 70–110)
POCT GLUCOSE: 414 MG/DL (ref 70–110)
POTASSIUM SERPL-SCNC: 6.1 MMOL/L (ref 3.5–5.1)
POTASSIUM SERPL-SCNC: 6.2 MMOL/L (ref 3.5–5.1)
POTASSIUM SERPL-SCNC: 6.2 MMOL/L (ref 3.5–5.1)
POTASSIUM SERPL-SCNC: 6.4 MMOL/L (ref 3.5–5.1)
POTASSIUM SERPL-SCNC: 6.5 MMOL/L (ref 3.5–5.1)
PROT SERPL-MCNC: 5 G/DL (ref 6–8.4)
PULM VEIN S/D RATIO: 0.92
PV PEAK D VEL: 0.72 M/S
PV PEAK S VEL: 0.66 M/S
RA MAJOR: 4.96 CM
RA PRESSURE: 15 MMHG
RA WIDTH: 3.42 CM
RBC # BLD AUTO: 2.04 M/UL (ref 4–5.4)
RBC # BLD AUTO: 2.61 M/UL (ref 4–5.4)
RIGHT VENTRICULAR END-DIASTOLIC DIMENSION: 3.87 CM
RV TISSUE DOPPLER FREE WALL SYSTOLIC VELOCITY 1 (APICAL 4 CHAMBER VIEW): 10.19 CM/S
SINUS: 2.05 CM
SODIUM SERPL-SCNC: 135 MMOL/L (ref 136–145)
SODIUM SERPL-SCNC: 136 MMOL/L (ref 136–145)
SODIUM SERPL-SCNC: 137 MMOL/L (ref 136–145)
SODIUM SERPL-SCNC: 137 MMOL/L (ref 136–145)
SODIUM SERPL-SCNC: 139 MMOL/L (ref 136–145)
STJ: 1.88 CM
TDI LATERAL: 0.1 M/S
TDI SEPTAL: 0.07 M/S
TDI: 0.09 M/S
TR MAX PG: 29 MMHG
TRICUSPID ANNULAR PLANE SYSTOLIC EXCURSION: 1.9 CM
TV REST PULMONARY ARTERY PRESSURE: 44 MMHG
WBC # BLD AUTO: 6.35 K/UL (ref 3.9–12.7)
WBC # BLD AUTO: 6.71 K/UL (ref 3.9–12.7)

## 2021-05-20 PROCEDURE — 83735 ASSAY OF MAGNESIUM: CPT | Mod: NTX | Performed by: STUDENT IN AN ORGANIZED HEALTH CARE EDUCATION/TRAINING PROGRAM

## 2021-05-20 PROCEDURE — 86580 TB INTRADERMAL TEST: CPT | Mod: NTX | Performed by: HOSPITALIST

## 2021-05-20 PROCEDURE — 80053 COMPREHEN METABOLIC PANEL: CPT | Mod: NTX | Performed by: STUDENT IN AN ORGANIZED HEALTH CARE EDUCATION/TRAINING PROGRAM

## 2021-05-20 PROCEDURE — 63600175 PHARM REV CODE 636 W HCPCS: Mod: NTX | Performed by: STUDENT IN AN ORGANIZED HEALTH CARE EDUCATION/TRAINING PROGRAM

## 2021-05-20 PROCEDURE — 25000003 PHARM REV CODE 250: Mod: NTX | Performed by: STUDENT IN AN ORGANIZED HEALTH CARE EDUCATION/TRAINING PROGRAM

## 2021-05-20 PROCEDURE — 80048 BASIC METABOLIC PNL TOTAL CA: CPT | Mod: 91,NTX | Performed by: STUDENT IN AN ORGANIZED HEALTH CARE EDUCATION/TRAINING PROGRAM

## 2021-05-20 PROCEDURE — 30200315 PPD INTRADERMAL TEST REV CODE 302: Mod: NTX | Performed by: HOSPITALIST

## 2021-05-20 PROCEDURE — P9016 RBC LEUKOCYTES REDUCED: HCPCS | Mod: NTX | Performed by: STUDENT IN AN ORGANIZED HEALTH CARE EDUCATION/TRAINING PROGRAM

## 2021-05-20 PROCEDURE — 84100 ASSAY OF PHOSPHORUS: CPT | Mod: NTX | Performed by: STUDENT IN AN ORGANIZED HEALTH CARE EDUCATION/TRAINING PROGRAM

## 2021-05-20 PROCEDURE — 93005 ELECTROCARDIOGRAM TRACING: CPT | Mod: NTX

## 2021-05-20 PROCEDURE — 25000242 PHARM REV CODE 250 ALT 637 W/ HCPCS: Mod: NTX | Performed by: STUDENT IN AN ORGANIZED HEALTH CARE EDUCATION/TRAINING PROGRAM

## 2021-05-20 PROCEDURE — 94761 N-INVAS EAR/PLS OXIMETRY MLT: CPT | Mod: NTX

## 2021-05-20 PROCEDURE — 83880 ASSAY OF NATRIURETIC PEPTIDE: CPT | Mod: NTX | Performed by: STUDENT IN AN ORGANIZED HEALTH CARE EDUCATION/TRAINING PROGRAM

## 2021-05-20 PROCEDURE — 20600001 HC STEP DOWN PRIVATE ROOM: Mod: NTX

## 2021-05-20 PROCEDURE — 25000003 PHARM REV CODE 250: Mod: NTX | Performed by: INTERNAL MEDICINE

## 2021-05-20 PROCEDURE — 63600175 PHARM REV CODE 636 W HCPCS: Mod: NTX | Performed by: INTERNAL MEDICINE

## 2021-05-20 PROCEDURE — 99223 PR INITIAL HOSPITAL CARE,LEVL III: ICD-10-PCS | Mod: NTX,,, | Performed by: HOSPITALIST

## 2021-05-20 PROCEDURE — 36415 COLL VENOUS BLD VENIPUNCTURE: CPT | Mod: NTX | Performed by: STUDENT IN AN ORGANIZED HEALTH CARE EDUCATION/TRAINING PROGRAM

## 2021-05-20 PROCEDURE — 94640 AIRWAY INHALATION TREATMENT: CPT | Mod: NTX

## 2021-05-20 PROCEDURE — 99223 1ST HOSP IP/OBS HIGH 75: CPT | Mod: NTX,,, | Performed by: INTERNAL MEDICINE

## 2021-05-20 PROCEDURE — 93010 EKG 12-LEAD: ICD-10-PCS | Mod: NTX,,, | Performed by: INTERNAL MEDICINE

## 2021-05-20 PROCEDURE — C9399 UNCLASSIFIED DRUGS OR BIOLOG: HCPCS | Mod: NTX | Performed by: STUDENT IN AN ORGANIZED HEALTH CARE EDUCATION/TRAINING PROGRAM

## 2021-05-20 PROCEDURE — 85025 COMPLETE CBC W/AUTO DIFF WBC: CPT | Mod: 91,NTX | Performed by: HOSPITALIST

## 2021-05-20 PROCEDURE — 99223 PR INITIAL HOSPITAL CARE,LEVL III: ICD-10-PCS | Mod: NTX,,, | Performed by: INTERNAL MEDICINE

## 2021-05-20 PROCEDURE — 93010 ELECTROCARDIOGRAM REPORT: CPT | Mod: NTX,,, | Performed by: INTERNAL MEDICINE

## 2021-05-20 PROCEDURE — 94644 CONT INHLJ TX 1ST HOUR: CPT | Mod: NTX

## 2021-05-20 PROCEDURE — 99223 1ST HOSP IP/OBS HIGH 75: CPT | Mod: NTX,,, | Performed by: HOSPITALIST

## 2021-05-20 PROCEDURE — 85025 COMPLETE CBC W/AUTO DIFF WBC: CPT | Mod: NTX | Performed by: STUDENT IN AN ORGANIZED HEALTH CARE EDUCATION/TRAINING PROGRAM

## 2021-05-20 RX ORDER — PROMETHAZINE HYDROCHLORIDE 12.5 MG/1
12.5 TABLET ORAL EVERY 6 HOURS PRN
Status: DISCONTINUED | OUTPATIENT
Start: 2021-05-20 | End: 2021-05-28 | Stop reason: HOSPADM

## 2021-05-20 RX ORDER — ONDANSETRON 2 MG/ML
4 INJECTION INTRAMUSCULAR; INTRAVENOUS EVERY 6 HOURS PRN
Status: DISCONTINUED | OUTPATIENT
Start: 2021-05-20 | End: 2021-05-28 | Stop reason: HOSPADM

## 2021-05-20 RX ORDER — SODIUM CHLORIDE 9 MG/ML
INJECTION, SOLUTION INTRAVENOUS ONCE
Status: DISCONTINUED | OUTPATIENT
Start: 2021-05-21 | End: 2021-05-24

## 2021-05-20 RX ORDER — SODIUM CHLORIDE 0.9 % (FLUSH) 0.9 %
3 SYRINGE (ML) INJECTION
Status: DISCONTINUED | OUTPATIENT
Start: 2021-05-20 | End: 2021-05-28 | Stop reason: HOSPADM

## 2021-05-20 RX ORDER — FUROSEMIDE 10 MG/ML
40 INJECTION INTRAMUSCULAR; INTRAVENOUS ONCE
Status: COMPLETED | OUTPATIENT
Start: 2021-05-20 | End: 2021-05-20

## 2021-05-20 RX ORDER — INSULIN ASPART 100 [IU]/ML
0-5 INJECTION, SOLUTION INTRAVENOUS; SUBCUTANEOUS
Status: DISCONTINUED | OUTPATIENT
Start: 2021-05-20 | End: 2021-05-23

## 2021-05-20 RX ORDER — ALBUTEROL SULFATE 2.5 MG/.5ML
10 SOLUTION RESPIRATORY (INHALATION) ONCE
Status: COMPLETED | OUTPATIENT
Start: 2021-05-20 | End: 2021-05-20

## 2021-05-20 RX ADMIN — SEVELAMER CARBONATE 1600 MG: 800 TABLET, FILM COATED ORAL at 01:05

## 2021-05-20 RX ADMIN — CHLOROTHIAZIDE SODIUM 500 MG: 500 INJECTION, POWDER, LYOPHILIZED, FOR SOLUTION INTRAVENOUS at 12:05

## 2021-05-20 RX ADMIN — HEPARIN SODIUM 5000 UNITS: 5000 INJECTION INTRAVENOUS; SUBCUTANEOUS at 09:05

## 2021-05-20 RX ADMIN — FERROUS SULFATE TAB EC 325 MG (65 MG FE EQUIVALENT) 325 MG: 325 (65 FE) TABLET DELAYED RESPONSE at 12:05

## 2021-05-20 RX ADMIN — INSULIN HUMAN 4.1 UNITS: 100 INJECTION, SOLUTION PARENTERAL at 08:05

## 2021-05-20 RX ADMIN — FERROUS SULFATE TAB EC 325 MG (65 MG FE EQUIVALENT) 325 MG: 325 (65 FE) TABLET DELAYED RESPONSE at 08:05

## 2021-05-20 RX ADMIN — FUROSEMIDE 160 MG: 10 INJECTION, SOLUTION INTRAMUSCULAR; INTRAVENOUS at 08:05

## 2021-05-20 RX ADMIN — SODIUM BICARBONATE 650 MG TABLET 1300 MG: at 08:05

## 2021-05-20 RX ADMIN — ASPIRIN 81 MG: 81 TABLET, COATED ORAL at 12:05

## 2021-05-20 RX ADMIN — INSULIN HUMAN 8.2 UNITS: 100 INJECTION, SOLUTION PARENTERAL at 02:05

## 2021-05-20 RX ADMIN — ESCITALOPRAM OXALATE 20 MG: 20 TABLET ORAL at 08:05

## 2021-05-20 RX ADMIN — INSULIN ASPART 2 UNITS: 100 INJECTION, SOLUTION INTRAVENOUS; SUBCUTANEOUS at 10:05

## 2021-05-20 RX ADMIN — CARVEDILOL 25 MG: 25 TABLET, FILM COATED ORAL at 12:05

## 2021-05-20 RX ADMIN — CALCIUM GLUCONATE 1 G: 98 INJECTION, SOLUTION INTRAVENOUS at 08:05

## 2021-05-20 RX ADMIN — FUROSEMIDE 40 MG: 10 INJECTION, SOLUTION INTRAMUSCULAR; INTRAVENOUS at 06:05

## 2021-05-20 RX ADMIN — HEPARIN SODIUM 5000 UNITS: 5000 INJECTION INTRAVENOUS; SUBCUTANEOUS at 06:05

## 2021-05-20 RX ADMIN — SEVELAMER CARBONATE 1600 MG: 800 TABLET, FILM COATED ORAL at 08:05

## 2021-05-20 RX ADMIN — PROMETHAZINE HYDROCHLORIDE 12.5 MG: 12.5 TABLET ORAL at 04:05

## 2021-05-20 RX ADMIN — SODIUM BICARBONATE 650 MG TABLET 1300 MG: at 02:05

## 2021-05-20 RX ADMIN — CALCITRIOL CAPSULES 0.25 MCG 0.5 MCG: 0.25 CAPSULE ORAL at 08:05

## 2021-05-20 RX ADMIN — FUROSEMIDE 160 MG: 10 INJECTION, SOLUTION INTRAMUSCULAR; INTRAVENOUS at 02:05

## 2021-05-20 RX ADMIN — CHLOROTHIAZIDE SODIUM 500 MG: 500 INJECTION, POWDER, LYOPHILIZED, FOR SOLUTION INTRAVENOUS at 08:05

## 2021-05-20 RX ADMIN — CHLOROTHIAZIDE SODIUM 250 MG: 500 INJECTION, POWDER, LYOPHILIZED, FOR SOLUTION INTRAVENOUS at 09:05

## 2021-05-20 RX ADMIN — INSULIN ASPART 5 UNITS: 100 INJECTION, SOLUTION INTRAVENOUS; SUBCUTANEOUS at 04:05

## 2021-05-20 RX ADMIN — FUROSEMIDE 120 MG: 10 INJECTION, SOLUTION INTRAMUSCULAR; INTRAVENOUS at 08:05

## 2021-05-20 RX ADMIN — INSULIN DETEMIR 4 UNITS: 100 INJECTION, SOLUTION SUBCUTANEOUS at 08:05

## 2021-05-20 RX ADMIN — ASPIRIN 81 MG: 81 TABLET, COATED ORAL at 08:05

## 2021-05-20 RX ADMIN — ALBUTEROL SULFATE 10 MG: 2.5 SOLUTION RESPIRATORY (INHALATION) at 08:05

## 2021-05-20 RX ADMIN — DEXTROSE MONOHYDRATE 25 G: 25 INJECTION, SOLUTION INTRAVENOUS at 02:05

## 2021-05-20 RX ADMIN — INSULIN DETEMIR 4 UNITS: 100 INJECTION, SOLUTION SUBCUTANEOUS at 10:05

## 2021-05-20 RX ADMIN — HEPARIN SODIUM 5000 UNITS: 5000 INJECTION INTRAVENOUS; SUBCUTANEOUS at 02:05

## 2021-05-20 RX ADMIN — TUBERCULIN PURIFIED PROTEIN DERIVATIVE 5 UNITS: 5 INJECTION, SOLUTION INTRADERMAL at 07:05

## 2021-05-21 LAB
ANION GAP SERPL CALC-SCNC: 14 MMOL/L (ref 8–16)
ANION GAP SERPL CALC-SCNC: 14 MMOL/L (ref 8–16)
ANION GAP SERPL CALC-SCNC: 17 MMOL/L (ref 8–16)
BACTERIA UR CULT: NORMAL
BACTERIA UR CULT: NORMAL
BASOPHILS # BLD AUTO: 0.06 K/UL (ref 0–0.2)
BASOPHILS NFR BLD: 0.7 % (ref 0–1.9)
BUN SERPL-MCNC: 53 MG/DL (ref 6–20)
BUN SERPL-MCNC: 70 MG/DL (ref 6–20)
BUN SERPL-MCNC: 71 MG/DL (ref 6–20)
CALCIUM SERPL-MCNC: 8 MG/DL (ref 8.7–10.5)
CALCIUM SERPL-MCNC: 8.1 MG/DL (ref 8.7–10.5)
CALCIUM SERPL-MCNC: 8.2 MG/DL (ref 8.7–10.5)
CHLORIDE SERPL-SCNC: 105 MMOL/L (ref 95–110)
CHLORIDE SERPL-SCNC: 108 MMOL/L (ref 95–110)
CHLORIDE SERPL-SCNC: 110 MMOL/L (ref 95–110)
CO2 SERPL-SCNC: 15 MMOL/L (ref 23–29)
CO2 SERPL-SCNC: 15 MMOL/L (ref 23–29)
CO2 SERPL-SCNC: 16 MMOL/L (ref 23–29)
CREAT SERPL-MCNC: 10 MG/DL (ref 0.5–1.4)
CREAT SERPL-MCNC: 10.4 MG/DL (ref 0.5–1.4)
CREAT SERPL-MCNC: 8.3 MG/DL (ref 0.5–1.4)
DIFFERENTIAL METHOD: ABNORMAL
EOSINOPHIL # BLD AUTO: 0.2 K/UL (ref 0–0.5)
EOSINOPHIL NFR BLD: 1.9 % (ref 0–8)
ERYTHROCYTE [DISTWIDTH] IN BLOOD BY AUTOMATED COUNT: 17.2 % (ref 11.5–14.5)
EST. GFR  (AFRICAN AMERICAN): 5.3 ML/MIN/1.73 M^2
EST. GFR  (AFRICAN AMERICAN): 5.6 ML/MIN/1.73 M^2
EST. GFR  (AFRICAN AMERICAN): 7 ML/MIN/1.73 M^2
EST. GFR  (NON AFRICAN AMERICAN): 4.6 ML/MIN/1.73 M^2
EST. GFR  (NON AFRICAN AMERICAN): 4.8 ML/MIN/1.73 M^2
EST. GFR  (NON AFRICAN AMERICAN): 6 ML/MIN/1.73 M^2
FERRITIN SERPL-MCNC: 293 NG/ML (ref 20–300)
GLUCOSE SERPL-MCNC: 129 MG/DL (ref 70–110)
GLUCOSE SERPL-MCNC: 199 MG/DL (ref 70–110)
GLUCOSE SERPL-MCNC: 254 MG/DL (ref 70–110)
HAV IGM SERPL QL IA: NEGATIVE
HBV CORE AB SERPL QL IA: NEGATIVE
HBV SURFACE AB SER-ACNC: NEGATIVE M[IU]/ML
HBV SURFACE AG SERPL QL IA: NEGATIVE
HCT VFR BLD AUTO: 25.3 % (ref 37–48.5)
HGB BLD-MCNC: 7.8 G/DL (ref 12–16)
IMM GRANULOCYTES # BLD AUTO: 0.04 K/UL (ref 0–0.04)
IMM GRANULOCYTES NFR BLD AUTO: 0.5 % (ref 0–0.5)
INR PPP: 1 (ref 0.8–1.2)
IRON SERPL-MCNC: 66 UG/DL (ref 30–160)
LYMPHOCYTES # BLD AUTO: 2.3 K/UL (ref 1–4.8)
LYMPHOCYTES NFR BLD: 26.9 % (ref 18–48)
MAGNESIUM SERPL-MCNC: 2.4 MG/DL (ref 1.6–2.6)
MCH RBC QN AUTO: 28.8 PG (ref 27–31)
MCHC RBC AUTO-ENTMCNC: 30.8 G/DL (ref 32–36)
MCV RBC AUTO: 93 FL (ref 82–98)
MONOCYTES # BLD AUTO: 0.8 K/UL (ref 0.3–1)
MONOCYTES NFR BLD: 9.9 % (ref 4–15)
NEUTROPHILS # BLD AUTO: 5.1 K/UL (ref 1.8–7.7)
NEUTROPHILS NFR BLD: 60.1 % (ref 38–73)
NRBC BLD-RTO: 0 /100 WBC
PHOSPHATE SERPL-MCNC: 11.7 MG/DL (ref 2.7–4.5)
PLATELET # BLD AUTO: 369 K/UL (ref 150–450)
PMV BLD AUTO: 11 FL (ref 9.2–12.9)
POCT GLUCOSE: 114 MG/DL (ref 70–110)
POCT GLUCOSE: 144 MG/DL (ref 70–110)
POCT GLUCOSE: 162 MG/DL (ref 70–110)
POCT GLUCOSE: 163 MG/DL (ref 70–110)
POCT GLUCOSE: 164 MG/DL (ref 70–110)
POCT GLUCOSE: 197 MG/DL (ref 70–110)
POCT GLUCOSE: 207 MG/DL (ref 70–110)
POCT GLUCOSE: 259 MG/DL (ref 70–110)
POCT GLUCOSE: 265 MG/DL (ref 70–110)
POTASSIUM SERPL-SCNC: 5.1 MMOL/L (ref 3.5–5.1)
POTASSIUM SERPL-SCNC: 5.7 MMOL/L (ref 3.5–5.1)
POTASSIUM SERPL-SCNC: 5.7 MMOL/L (ref 3.5–5.1)
PROTHROMBIN TIME: 10.5 SEC (ref 9–12.5)
RBC # BLD AUTO: 2.71 M/UL (ref 4–5.4)
SATURATED IRON: 28 % (ref 20–50)
SODIUM SERPL-SCNC: 137 MMOL/L (ref 136–145)
SODIUM SERPL-SCNC: 138 MMOL/L (ref 136–145)
SODIUM SERPL-SCNC: 139 MMOL/L (ref 136–145)
TOTAL IRON BINDING CAPACITY: 234 UG/DL (ref 250–450)
TRANSFERRIN SERPL-MCNC: 158 MG/DL (ref 200–375)
WBC # BLD AUTO: 8.49 K/UL (ref 3.9–12.7)

## 2021-05-21 PROCEDURE — 63600175 PHARM REV CODE 636 W HCPCS: Mod: NTX | Performed by: STUDENT IN AN ORGANIZED HEALTH CARE EDUCATION/TRAINING PROGRAM

## 2021-05-21 PROCEDURE — 85025 COMPLETE CBC W/AUTO DIFF WBC: CPT | Mod: NTX | Performed by: STUDENT IN AN ORGANIZED HEALTH CARE EDUCATION/TRAINING PROGRAM

## 2021-05-21 PROCEDURE — 82728 ASSAY OF FERRITIN: CPT | Mod: NTX | Performed by: INTERNAL MEDICINE

## 2021-05-21 PROCEDURE — 25000003 PHARM REV CODE 250: Mod: NTX | Performed by: GENERAL PRACTICE

## 2021-05-21 PROCEDURE — 63600175 PHARM REV CODE 636 W HCPCS: Mod: NTX | Performed by: RADIOLOGY

## 2021-05-21 PROCEDURE — 85610 PROTHROMBIN TIME: CPT | Mod: NTX | Performed by: SURGERY

## 2021-05-21 PROCEDURE — 80048 BASIC METABOLIC PNL TOTAL CA: CPT | Mod: NTX | Performed by: STUDENT IN AN ORGANIZED HEALTH CARE EDUCATION/TRAINING PROGRAM

## 2021-05-21 PROCEDURE — 83735 ASSAY OF MAGNESIUM: CPT | Mod: NTX | Performed by: STUDENT IN AN ORGANIZED HEALTH CARE EDUCATION/TRAINING PROGRAM

## 2021-05-21 PROCEDURE — 36415 COLL VENOUS BLD VENIPUNCTURE: CPT | Mod: NTX | Performed by: SURGERY

## 2021-05-21 PROCEDURE — 25000003 PHARM REV CODE 250: Mod: NTX | Performed by: HOSPITALIST

## 2021-05-21 PROCEDURE — 86704 HEP B CORE ANTIBODY TOTAL: CPT | Mod: NTX | Performed by: HOSPITALIST

## 2021-05-21 PROCEDURE — 63600175 PHARM REV CODE 636 W HCPCS: Mod: NTX | Performed by: GENERAL PRACTICE

## 2021-05-21 PROCEDURE — 99233 PR SUBSEQUENT HOSPITAL CARE,LEVL III: ICD-10-PCS | Mod: NTX,,, | Performed by: HOSPITALIST

## 2021-05-21 PROCEDURE — 86709 HEPATITIS A IGM ANTIBODY: CPT | Mod: NTX | Performed by: HOSPITALIST

## 2021-05-21 PROCEDURE — 25000003 PHARM REV CODE 250: Mod: NTX | Performed by: INTERNAL MEDICINE

## 2021-05-21 PROCEDURE — 20600001 HC STEP DOWN PRIVATE ROOM: Mod: NTX

## 2021-05-21 PROCEDURE — 90935 PR HEMODIALYSIS, ONE EVALUATION: ICD-10-PCS | Mod: NTX,,, | Performed by: INTERNAL MEDICINE

## 2021-05-21 PROCEDURE — 87340 HEPATITIS B SURFACE AG IA: CPT | Mod: NTX | Performed by: HOSPITALIST

## 2021-05-21 PROCEDURE — 84100 ASSAY OF PHOSPHORUS: CPT | Mod: NTX | Performed by: STUDENT IN AN ORGANIZED HEALTH CARE EDUCATION/TRAINING PROGRAM

## 2021-05-21 PROCEDURE — 80048 BASIC METABOLIC PNL TOTAL CA: CPT | Mod: 91,NTX | Performed by: STUDENT IN AN ORGANIZED HEALTH CARE EDUCATION/TRAINING PROGRAM

## 2021-05-21 PROCEDURE — 63600175 PHARM REV CODE 636 W HCPCS: Mod: NTX | Performed by: INTERNAL MEDICINE

## 2021-05-21 PROCEDURE — 25000003 PHARM REV CODE 250: Mod: NTX | Performed by: STUDENT IN AN ORGANIZED HEALTH CARE EDUCATION/TRAINING PROGRAM

## 2021-05-21 PROCEDURE — 86706 HEP B SURFACE ANTIBODY: CPT | Mod: NTX | Performed by: HOSPITALIST

## 2021-05-21 PROCEDURE — 90935 HEMODIALYSIS ONE EVALUATION: CPT | Mod: NTX

## 2021-05-21 PROCEDURE — 90935 HEMODIALYSIS ONE EVALUATION: CPT | Mod: NTX,,, | Performed by: INTERNAL MEDICINE

## 2021-05-21 PROCEDURE — 99233 SBSQ HOSP IP/OBS HIGH 50: CPT | Mod: NTX,,, | Performed by: HOSPITALIST

## 2021-05-21 PROCEDURE — 36415 COLL VENOUS BLD VENIPUNCTURE: CPT | Mod: NTX | Performed by: STUDENT IN AN ORGANIZED HEALTH CARE EDUCATION/TRAINING PROGRAM

## 2021-05-21 PROCEDURE — 83540 ASSAY OF IRON: CPT | Mod: NTX | Performed by: INTERNAL MEDICINE

## 2021-05-21 RX ORDER — NIFEDIPINE 60 MG/1
60 TABLET, EXTENDED RELEASE ORAL 2 TIMES DAILY
Status: DISCONTINUED | OUTPATIENT
Start: 2021-05-21 | End: 2021-05-28 | Stop reason: HOSPADM

## 2021-05-21 RX ORDER — MUPIROCIN 20 MG/G
OINTMENT TOPICAL 2 TIMES DAILY
Status: DISPENSED | OUTPATIENT
Start: 2021-05-21 | End: 2021-05-26

## 2021-05-21 RX ORDER — HYDRALAZINE HYDROCHLORIDE 20 MG/ML
INJECTION INTRAMUSCULAR; INTRAVENOUS CODE/TRAUMA/SEDATION MEDICATION
Status: COMPLETED | OUTPATIENT
Start: 2021-05-21 | End: 2021-05-21

## 2021-05-21 RX ORDER — FENTANYL CITRATE 50 UG/ML
INJECTION, SOLUTION INTRAMUSCULAR; INTRAVENOUS CODE/TRAUMA/SEDATION MEDICATION
Status: COMPLETED | OUTPATIENT
Start: 2021-05-21 | End: 2021-05-21

## 2021-05-21 RX ORDER — CARVEDILOL 25 MG/1
25 TABLET ORAL 2 TIMES DAILY
Status: DISCONTINUED | OUTPATIENT
Start: 2021-05-21 | End: 2021-05-28 | Stop reason: HOSPADM

## 2021-05-21 RX ORDER — SODIUM CHLORIDE 9 MG/ML
INJECTION, SOLUTION INTRAVENOUS ONCE
Status: COMPLETED | OUTPATIENT
Start: 2021-05-21 | End: 2021-05-21

## 2021-05-21 RX ORDER — HEPARIN SODIUM 1000 [USP'U]/ML
1000 INJECTION, SOLUTION INTRAVENOUS; SUBCUTANEOUS
Status: DISCONTINUED | OUTPATIENT
Start: 2021-05-21 | End: 2021-05-28 | Stop reason: HOSPADM

## 2021-05-21 RX ORDER — NIFEDIPINE 60 MG/1
60 TABLET, EXTENDED RELEASE ORAL DAILY
Status: DISCONTINUED | OUTPATIENT
Start: 2021-05-21 | End: 2021-05-21

## 2021-05-21 RX ORDER — MIDAZOLAM HYDROCHLORIDE 1 MG/ML
INJECTION INTRAMUSCULAR; INTRAVENOUS CODE/TRAUMA/SEDATION MEDICATION
Status: COMPLETED | OUTPATIENT
Start: 2021-05-21 | End: 2021-05-21

## 2021-05-21 RX ORDER — CEFAZOLIN SODIUM 1 G/50ML
SOLUTION INTRAVENOUS
Status: COMPLETED | OUTPATIENT
Start: 2021-05-21 | End: 2021-05-21

## 2021-05-21 RX ORDER — INSULIN ASPART 100 [IU]/ML
3 INJECTION, SOLUTION INTRAVENOUS; SUBCUTANEOUS
Status: DISCONTINUED | OUTPATIENT
Start: 2021-05-21 | End: 2021-05-22

## 2021-05-21 RX ORDER — HYDROMORPHONE HYDROCHLORIDE 1 MG/ML
0.2 INJECTION, SOLUTION INTRAMUSCULAR; INTRAVENOUS; SUBCUTANEOUS EVERY 6 HOURS PRN
Status: DISCONTINUED | OUTPATIENT
Start: 2021-05-21 | End: 2021-05-25

## 2021-05-21 RX ADMIN — FERROUS SULFATE TAB EC 325 MG (65 MG FE EQUIVALENT) 325 MG: 325 (65 FE) TABLET DELAYED RESPONSE at 08:05

## 2021-05-21 RX ADMIN — HYDROMORPHONE HYDROCHLORIDE 0.2 MG: 1 INJECTION, SOLUTION INTRAMUSCULAR; INTRAVENOUS; SUBCUTANEOUS at 04:05

## 2021-05-21 RX ADMIN — SEVELAMER CARBONATE 1600 MG: 800 TABLET, FILM COATED ORAL at 06:05

## 2021-05-21 RX ADMIN — FENTANYL CITRATE 50 MCG: 50 INJECTION, SOLUTION INTRAMUSCULAR; INTRAVENOUS at 10:05

## 2021-05-21 RX ADMIN — HEPARIN SODIUM 5000 UNITS: 5000 INJECTION INTRAVENOUS; SUBCUTANEOUS at 09:05

## 2021-05-21 RX ADMIN — HEPARIN SODIUM 5000 UNITS: 5000 INJECTION INTRAVENOUS; SUBCUTANEOUS at 05:05

## 2021-05-21 RX ADMIN — SODIUM BICARBONATE 650 MG TABLET 1300 MG: at 08:05

## 2021-05-21 RX ADMIN — FUROSEMIDE 160 MG: 10 INJECTION, SOLUTION INTRAMUSCULAR; INTRAVENOUS at 06:05

## 2021-05-21 RX ADMIN — INSULIN DETEMIR 4 UNITS: 100 INJECTION, SOLUTION SUBCUTANEOUS at 08:05

## 2021-05-21 RX ADMIN — CARVEDILOL 25 MG: 25 TABLET, FILM COATED ORAL at 08:05

## 2021-05-21 RX ADMIN — CALCITRIOL CAPSULES 0.25 MCG 0.5 MCG: 0.25 CAPSULE ORAL at 08:05

## 2021-05-21 RX ADMIN — MUPIROCIN: 20 OINTMENT TOPICAL at 12:05

## 2021-05-21 RX ADMIN — CHLOROTHIAZIDE SODIUM 250 MG: 500 INJECTION, POWDER, LYOPHILIZED, FOR SOLUTION INTRAVENOUS at 08:05

## 2021-05-21 RX ADMIN — NIFEDIPINE 60 MG: 60 TABLET, FILM COATED, EXTENDED RELEASE ORAL at 08:05

## 2021-05-21 RX ADMIN — INSULIN ASPART 3 UNITS: 100 INJECTION, SOLUTION INTRAVENOUS; SUBCUTANEOUS at 10:05

## 2021-05-21 RX ADMIN — FUROSEMIDE 160 MG: 10 INJECTION, SOLUTION INTRAMUSCULAR; INTRAVENOUS at 08:05

## 2021-05-21 RX ADMIN — ESCITALOPRAM OXALATE 20 MG: 20 TABLET ORAL at 08:05

## 2021-05-21 RX ADMIN — SODIUM CHLORIDE: 0.9 INJECTION, SOLUTION INTRAVENOUS at 03:05

## 2021-05-21 RX ADMIN — HYDRALAZINE HYDROCHLORIDE 10 MG: 20 INJECTION INTRAMUSCULAR; INTRAVENOUS at 10:05

## 2021-05-21 RX ADMIN — HEPARIN SODIUM 1000 UNITS: 1000 INJECTION, SOLUTION INTRAVENOUS; SUBCUTANEOUS at 05:05

## 2021-05-21 RX ADMIN — ASPIRIN 81 MG: 81 TABLET, COATED ORAL at 08:05

## 2021-05-21 RX ADMIN — SODIUM BICARBONATE 650 MG TABLET 1300 MG: at 06:05

## 2021-05-21 RX ADMIN — MUPIROCIN: 20 OINTMENT TOPICAL at 09:05

## 2021-05-21 RX ADMIN — MIDAZOLAM HYDROCHLORIDE 1 MG: 1 INJECTION, SOLUTION INTRAMUSCULAR; INTRAVENOUS at 10:05

## 2021-05-21 RX ADMIN — CEFAZOLIN SODIUM 1 G: 1 SOLUTION INTRAVENOUS at 10:05

## 2021-05-21 RX ADMIN — NIFEDIPINE 60 MG: 60 TABLET, FILM COATED, EXTENDED RELEASE ORAL at 12:05

## 2021-05-21 RX ADMIN — SODIUM BICARBONATE 650 MG TABLET 1300 MG: at 09:05

## 2021-05-21 RX ADMIN — INSULIN ASPART 3 UNITS: 100 INJECTION, SOLUTION INTRAVENOUS; SUBCUTANEOUS at 06:05

## 2021-05-21 RX ADMIN — FUROSEMIDE 160 MG: 10 INJECTION, SOLUTION INTRAMUSCULAR; INTRAVENOUS at 12:05

## 2021-05-22 PROBLEM — Z75.8 DISCHARGE PLANNING ISSUES: Status: ACTIVE | Noted: 2021-05-22

## 2021-05-22 PROBLEM — Z02.9 DISCHARGE PLANNING ISSUES: Status: ACTIVE | Noted: 2021-05-22

## 2021-05-22 LAB
ALBUMIN SERPL BCP-MCNC: 1.5 G/DL (ref 3.5–5.2)
ALBUMIN SERPL BCP-MCNC: 1.5 G/DL (ref 3.5–5.2)
ALP SERPL-CCNC: 119 U/L (ref 55–135)
ALP SERPL-CCNC: 125 U/L (ref 55–135)
ALT SERPL W/O P-5'-P-CCNC: 28 U/L (ref 10–44)
ALT SERPL W/O P-5'-P-CCNC: 37 U/L (ref 10–44)
ANION GAP SERPL CALC-SCNC: 13 MMOL/L (ref 8–16)
ANION GAP SERPL CALC-SCNC: 13 MMOL/L (ref 8–16)
ANION GAP SERPL CALC-SCNC: 14 MMOL/L (ref 8–16)
ANION GAP SERPL CALC-SCNC: 14 MMOL/L (ref 8–16)
ANION GAP SERPL CALC-SCNC: 15 MMOL/L (ref 8–16)
ANION GAP SERPL CALC-SCNC: 16 MMOL/L (ref 8–16)
ANION GAP SERPL CALC-SCNC: 17 MMOL/L (ref 8–16)
AST SERPL-CCNC: 16 U/L (ref 10–40)
AST SERPL-CCNC: 16 U/L (ref 10–40)
BASOPHILS # BLD AUTO: 0.06 K/UL (ref 0–0.2)
BASOPHILS NFR BLD: 0.9 % (ref 0–1.9)
BILIRUB SERPL-MCNC: 0.1 MG/DL (ref 0.1–1)
BILIRUB SERPL-MCNC: 0.1 MG/DL (ref 0.1–1)
BUN SERPL-MCNC: 37 MG/DL (ref 6–20)
BUN SERPL-MCNC: 39 MG/DL (ref 6–20)
BUN SERPL-MCNC: 39 MG/DL (ref 6–20)
BUN SERPL-MCNC: 55 MG/DL (ref 6–20)
BUN SERPL-MCNC: 55 MG/DL (ref 6–20)
BUN SERPL-MCNC: 57 MG/DL (ref 6–20)
BUN SERPL-MCNC: 58 MG/DL (ref 6–20)
CALCIUM SERPL-MCNC: 7.5 MG/DL (ref 8.7–10.5)
CALCIUM SERPL-MCNC: 7.7 MG/DL (ref 8.7–10.5)
CALCIUM SERPL-MCNC: 8 MG/DL (ref 8.7–10.5)
CHLORIDE SERPL-SCNC: 103 MMOL/L (ref 95–110)
CHLORIDE SERPL-SCNC: 104 MMOL/L (ref 95–110)
CHLORIDE SERPL-SCNC: 104 MMOL/L (ref 95–110)
CHLORIDE SERPL-SCNC: 105 MMOL/L (ref 95–110)
CHLORIDE SERPL-SCNC: 105 MMOL/L (ref 95–110)
CHLORIDE SERPL-SCNC: 107 MMOL/L (ref 95–110)
CHLORIDE SERPL-SCNC: 107 MMOL/L (ref 95–110)
CO2 SERPL-SCNC: 17 MMOL/L (ref 23–29)
CO2 SERPL-SCNC: 17 MMOL/L (ref 23–29)
CO2 SERPL-SCNC: 19 MMOL/L (ref 23–29)
CO2 SERPL-SCNC: 21 MMOL/L (ref 23–29)
CO2 SERPL-SCNC: 21 MMOL/L (ref 23–29)
CREAT SERPL-MCNC: 6.2 MG/DL (ref 0.5–1.4)
CREAT SERPL-MCNC: 6.5 MG/DL (ref 0.5–1.4)
CREAT SERPL-MCNC: 6.5 MG/DL (ref 0.5–1.4)
CREAT SERPL-MCNC: 8.1 MG/DL (ref 0.5–1.4)
CREAT SERPL-MCNC: 8.1 MG/DL (ref 0.5–1.4)
CREAT SERPL-MCNC: 8.3 MG/DL (ref 0.5–1.4)
CREAT SERPL-MCNC: 8.6 MG/DL (ref 0.5–1.4)
DIFFERENTIAL METHOD: ABNORMAL
EOSINOPHIL # BLD AUTO: 0.1 K/UL (ref 0–0.5)
EOSINOPHIL NFR BLD: 1.3 % (ref 0–8)
ERYTHROCYTE [DISTWIDTH] IN BLOOD BY AUTOMATED COUNT: 16.9 % (ref 11.5–14.5)
EST. GFR  (AFRICAN AMERICAN): 6.7 ML/MIN/1.73 M^2
EST. GFR  (AFRICAN AMERICAN): 7 ML/MIN/1.73 M^2
EST. GFR  (AFRICAN AMERICAN): 7.2 ML/MIN/1.73 M^2
EST. GFR  (AFRICAN AMERICAN): 7.2 ML/MIN/1.73 M^2
EST. GFR  (AFRICAN AMERICAN): 9.3 ML/MIN/1.73 M^2
EST. GFR  (AFRICAN AMERICAN): 9.3 ML/MIN/1.73 M^2
EST. GFR  (AFRICAN AMERICAN): 9.9 ML/MIN/1.73 M^2
EST. GFR  (NON AFRICAN AMERICAN): 5.8 ML/MIN/1.73 M^2
EST. GFR  (NON AFRICAN AMERICAN): 6 ML/MIN/1.73 M^2
EST. GFR  (NON AFRICAN AMERICAN): 6.2 ML/MIN/1.73 M^2
EST. GFR  (NON AFRICAN AMERICAN): 6.2 ML/MIN/1.73 M^2
EST. GFR  (NON AFRICAN AMERICAN): 8.1 ML/MIN/1.73 M^2
EST. GFR  (NON AFRICAN AMERICAN): 8.1 ML/MIN/1.73 M^2
EST. GFR  (NON AFRICAN AMERICAN): 8.6 ML/MIN/1.73 M^2
GLUCOSE SERPL-MCNC: 138 MG/DL (ref 70–110)
GLUCOSE SERPL-MCNC: 157 MG/DL (ref 70–110)
GLUCOSE SERPL-MCNC: 157 MG/DL (ref 70–110)
GLUCOSE SERPL-MCNC: 223 MG/DL (ref 70–110)
GLUCOSE SERPL-MCNC: 223 MG/DL (ref 70–110)
GLUCOSE SERPL-MCNC: 282 MG/DL (ref 70–110)
GLUCOSE SERPL-MCNC: 99 MG/DL (ref 70–110)
HCT VFR BLD AUTO: 24 % (ref 37–48.5)
HGB BLD-MCNC: 7.7 G/DL (ref 12–16)
IMM GRANULOCYTES # BLD AUTO: 0.03 K/UL (ref 0–0.04)
IMM GRANULOCYTES NFR BLD AUTO: 0.4 % (ref 0–0.5)
LYMPHOCYTES # BLD AUTO: 2.4 K/UL (ref 1–4.8)
LYMPHOCYTES NFR BLD: 35.7 % (ref 18–48)
MAGNESIUM SERPL-MCNC: 2.3 MG/DL (ref 1.6–2.6)
MCH RBC QN AUTO: 29.3 PG (ref 27–31)
MCHC RBC AUTO-ENTMCNC: 32.1 G/DL (ref 32–36)
MCV RBC AUTO: 91 FL (ref 82–98)
MONOCYTES # BLD AUTO: 0.7 K/UL (ref 0.3–1)
MONOCYTES NFR BLD: 10.1 % (ref 4–15)
NEUTROPHILS # BLD AUTO: 3.5 K/UL (ref 1.8–7.7)
NEUTROPHILS NFR BLD: 51.6 % (ref 38–73)
NRBC BLD-RTO: 0 /100 WBC
PHOSPHATE SERPL-MCNC: 9.9 MG/DL (ref 2.7–4.5)
PLATELET # BLD AUTO: 311 K/UL (ref 150–450)
PMV BLD AUTO: 11.2 FL (ref 9.2–12.9)
POCT GLUCOSE: 112 MG/DL (ref 70–110)
POCT GLUCOSE: 129 MG/DL (ref 70–110)
POCT GLUCOSE: 129 MG/DL (ref 70–110)
POCT GLUCOSE: 136 MG/DL (ref 70–110)
POCT GLUCOSE: 174 MG/DL (ref 70–110)
POCT GLUCOSE: 180 MG/DL (ref 70–110)
POCT GLUCOSE: 243 MG/DL (ref 70–110)
POCT GLUCOSE: 301 MG/DL (ref 70–110)
POCT GLUCOSE: 351 MG/DL (ref 70–110)
POCT GLUCOSE: 365 MG/DL (ref 70–110)
POCT GLUCOSE: >500 MG/DL (ref 70–110)
POTASSIUM SERPL-SCNC: 4.5 MMOL/L (ref 3.5–5.1)
POTASSIUM SERPL-SCNC: 4.7 MMOL/L (ref 3.5–5.1)
POTASSIUM SERPL-SCNC: 4.7 MMOL/L (ref 3.5–5.1)
POTASSIUM SERPL-SCNC: 4.8 MMOL/L (ref 3.5–5.1)
POTASSIUM SERPL-SCNC: 4.8 MMOL/L (ref 3.5–5.1)
POTASSIUM SERPL-SCNC: 5.2 MMOL/L (ref 3.5–5.1)
POTASSIUM SERPL-SCNC: 5.3 MMOL/L (ref 3.5–5.1)
PROT SERPL-MCNC: 5.1 G/DL (ref 6–8.4)
PROT SERPL-MCNC: 5.3 G/DL (ref 6–8.4)
RBC # BLD AUTO: 2.63 M/UL (ref 4–5.4)
SODIUM SERPL-SCNC: 137 MMOL/L (ref 136–145)
SODIUM SERPL-SCNC: 139 MMOL/L (ref 136–145)
SODIUM SERPL-SCNC: 139 MMOL/L (ref 136–145)
SODIUM SERPL-SCNC: 140 MMOL/L (ref 136–145)
SODIUM SERPL-SCNC: 141 MMOL/L (ref 136–145)
WBC # BLD AUTO: 6.84 K/UL (ref 3.9–12.7)

## 2021-05-22 PROCEDURE — 25000003 PHARM REV CODE 250: Mod: NTX | Performed by: STUDENT IN AN ORGANIZED HEALTH CARE EDUCATION/TRAINING PROGRAM

## 2021-05-22 PROCEDURE — 63600175 PHARM REV CODE 636 W HCPCS: Mod: NTX | Performed by: INTERNAL MEDICINE

## 2021-05-22 PROCEDURE — 63600175 PHARM REV CODE 636 W HCPCS: Mod: NTX | Performed by: STUDENT IN AN ORGANIZED HEALTH CARE EDUCATION/TRAINING PROGRAM

## 2021-05-22 PROCEDURE — 84100 ASSAY OF PHOSPHORUS: CPT | Mod: NTX | Performed by: STUDENT IN AN ORGANIZED HEALTH CARE EDUCATION/TRAINING PROGRAM

## 2021-05-22 PROCEDURE — 20600001 HC STEP DOWN PRIVATE ROOM: Mod: NTX

## 2021-05-22 PROCEDURE — 80048 BASIC METABOLIC PNL TOTAL CA: CPT | Mod: 91,NTX | Performed by: STUDENT IN AN ORGANIZED HEALTH CARE EDUCATION/TRAINING PROGRAM

## 2021-05-22 PROCEDURE — 36415 COLL VENOUS BLD VENIPUNCTURE: CPT | Mod: NTX | Performed by: STUDENT IN AN ORGANIZED HEALTH CARE EDUCATION/TRAINING PROGRAM

## 2021-05-22 PROCEDURE — 25000003 PHARM REV CODE 250: Mod: NTX | Performed by: HOSPITALIST

## 2021-05-22 PROCEDURE — 63600175 PHARM REV CODE 636 W HCPCS: Mod: NTX | Performed by: GENERAL PRACTICE

## 2021-05-22 PROCEDURE — 85025 COMPLETE CBC W/AUTO DIFF WBC: CPT | Mod: NTX | Performed by: STUDENT IN AN ORGANIZED HEALTH CARE EDUCATION/TRAINING PROGRAM

## 2021-05-22 PROCEDURE — 25000003 PHARM REV CODE 250: Mod: NTX | Performed by: INTERNAL MEDICINE

## 2021-05-22 PROCEDURE — 80053 COMPREHEN METABOLIC PANEL: CPT | Mod: NTX | Performed by: STUDENT IN AN ORGANIZED HEALTH CARE EDUCATION/TRAINING PROGRAM

## 2021-05-22 PROCEDURE — 90935 PR HEMODIALYSIS, ONE EVALUATION: ICD-10-PCS | Mod: NTX,,, | Performed by: INTERNAL MEDICINE

## 2021-05-22 PROCEDURE — 90935 HEMODIALYSIS ONE EVALUATION: CPT | Mod: NTX

## 2021-05-22 PROCEDURE — 99232 SBSQ HOSP IP/OBS MODERATE 35: CPT | Mod: NTX,,, | Performed by: HOSPITALIST

## 2021-05-22 PROCEDURE — C9399 UNCLASSIFIED DRUGS OR BIOLOG: HCPCS | Mod: NTX | Performed by: STUDENT IN AN ORGANIZED HEALTH CARE EDUCATION/TRAINING PROGRAM

## 2021-05-22 PROCEDURE — 90935 HEMODIALYSIS ONE EVALUATION: CPT | Mod: NTX,,, | Performed by: INTERNAL MEDICINE

## 2021-05-22 PROCEDURE — 83735 ASSAY OF MAGNESIUM: CPT | Mod: NTX | Performed by: STUDENT IN AN ORGANIZED HEALTH CARE EDUCATION/TRAINING PROGRAM

## 2021-05-22 PROCEDURE — 99232 PR SUBSEQUENT HOSPITAL CARE,LEVL II: ICD-10-PCS | Mod: NTX,,, | Performed by: HOSPITALIST

## 2021-05-22 PROCEDURE — 80048 BASIC METABOLIC PNL TOTAL CA: CPT | Mod: NTX | Performed by: STUDENT IN AN ORGANIZED HEALTH CARE EDUCATION/TRAINING PROGRAM

## 2021-05-22 RX ORDER — INSULIN ASPART 100 [IU]/ML
5 INJECTION, SOLUTION INTRAVENOUS; SUBCUTANEOUS
Status: DISCONTINUED | OUTPATIENT
Start: 2021-05-23 | End: 2021-05-23

## 2021-05-22 RX ORDER — SODIUM CHLORIDE 9 MG/ML
INJECTION, SOLUTION INTRAVENOUS ONCE
Status: CANCELLED | OUTPATIENT
Start: 2021-05-22 | End: 2021-05-22

## 2021-05-22 RX ORDER — SODIUM CHLORIDE 9 MG/ML
INJECTION, SOLUTION INTRAVENOUS
Status: CANCELLED | OUTPATIENT
Start: 2021-05-22

## 2021-05-22 RX ADMIN — FUROSEMIDE 160 MG: 10 INJECTION, SOLUTION INTRAMUSCULAR; INTRAVENOUS at 12:05

## 2021-05-22 RX ADMIN — MUPIROCIN: 20 OINTMENT TOPICAL at 08:05

## 2021-05-22 RX ADMIN — CARVEDILOL 25 MG: 25 TABLET, FILM COATED ORAL at 08:05

## 2021-05-22 RX ADMIN — INSULIN ASPART 3 UNITS: 100 INJECTION, SOLUTION INTRAVENOUS; SUBCUTANEOUS at 04:05

## 2021-05-22 RX ADMIN — FUROSEMIDE 160 MG: 10 INJECTION, SOLUTION INTRAMUSCULAR; INTRAVENOUS at 04:05

## 2021-05-22 RX ADMIN — INSULIN ASPART 3 UNITS: 100 INJECTION, SOLUTION INTRAVENOUS; SUBCUTANEOUS at 12:05

## 2021-05-22 RX ADMIN — NIFEDIPINE 60 MG: 60 TABLET, FILM COATED, EXTENDED RELEASE ORAL at 11:05

## 2021-05-22 RX ADMIN — CHLOROTHIAZIDE SODIUM 250 MG: 500 INJECTION, POWDER, LYOPHILIZED, FOR SOLUTION INTRAVENOUS at 01:05

## 2021-05-22 RX ADMIN — HEPARIN SODIUM 5000 UNITS: 5000 INJECTION INTRAVENOUS; SUBCUTANEOUS at 11:05

## 2021-05-22 RX ADMIN — FERROUS SULFATE TAB EC 325 MG (65 MG FE EQUIVALENT) 325 MG: 325 (65 FE) TABLET DELAYED RESPONSE at 08:05

## 2021-05-22 RX ADMIN — HEPARIN SODIUM 5000 UNITS: 5000 INJECTION INTRAVENOUS; SUBCUTANEOUS at 04:05

## 2021-05-22 RX ADMIN — MUPIROCIN: 20 OINTMENT TOPICAL at 11:05

## 2021-05-22 RX ADMIN — SODIUM BICARBONATE 650 MG TABLET 1300 MG: at 11:05

## 2021-05-22 RX ADMIN — INSULIN ASPART 3 UNITS: 100 INJECTION, SOLUTION INTRAVENOUS; SUBCUTANEOUS at 11:05

## 2021-05-22 RX ADMIN — INSULIN DETEMIR 4 UNITS: 100 INJECTION, SOLUTION SUBCUTANEOUS at 08:05

## 2021-05-22 RX ADMIN — SEVELAMER CARBONATE 1600 MG: 800 TABLET, FILM COATED ORAL at 05:05

## 2021-05-22 RX ADMIN — FERROUS SULFATE TAB EC 325 MG (65 MG FE EQUIVALENT) 325 MG: 325 (65 FE) TABLET DELAYED RESPONSE at 11:05

## 2021-05-22 RX ADMIN — INSULIN ASPART 2 UNITS: 100 INJECTION, SOLUTION INTRAVENOUS; SUBCUTANEOUS at 04:05

## 2021-05-22 RX ADMIN — CALCITRIOL CAPSULES 0.25 MCG 0.5 MCG: 0.25 CAPSULE ORAL at 08:05

## 2021-05-22 RX ADMIN — NIFEDIPINE 60 MG: 60 TABLET, FILM COATED, EXTENDED RELEASE ORAL at 08:05

## 2021-05-22 RX ADMIN — SEVELAMER CARBONATE 1600 MG: 800 TABLET, FILM COATED ORAL at 08:05

## 2021-05-22 RX ADMIN — CARVEDILOL 25 MG: 25 TABLET, FILM COATED ORAL at 11:05

## 2021-05-22 RX ADMIN — MELATONIN TAB 3 MG 6 MG: 3 TAB at 11:05

## 2021-05-22 RX ADMIN — ASPIRIN 81 MG: 81 TABLET, COATED ORAL at 11:05

## 2021-05-22 RX ADMIN — INSULIN DETEMIR 8 UNITS: 100 INJECTION, SOLUTION SUBCUTANEOUS at 11:05

## 2021-05-22 RX ADMIN — HEPARIN SODIUM 1000 UNITS: 1000 INJECTION, SOLUTION INTRAVENOUS; SUBCUTANEOUS at 11:05

## 2021-05-22 RX ADMIN — SODIUM BICARBONATE 650 MG TABLET 1300 MG: at 08:05

## 2021-05-22 RX ADMIN — SODIUM BICARBONATE 650 MG TABLET 1300 MG: at 04:05

## 2021-05-22 RX ADMIN — ESCITALOPRAM OXALATE 20 MG: 20 TABLET ORAL at 08:05

## 2021-05-22 RX ADMIN — HEPARIN SODIUM 5000 UNITS: 5000 INJECTION INTRAVENOUS; SUBCUTANEOUS at 05:05

## 2021-05-22 RX ADMIN — SEVELAMER CARBONATE 1600 MG: 800 TABLET, FILM COATED ORAL at 12:05

## 2021-05-23 LAB
ABO + RH BLD: NORMAL
ALBUMIN SERPL BCP-MCNC: 1.5 G/DL (ref 3.5–5.2)
ALBUMIN SERPL BCP-MCNC: 1.6 G/DL (ref 3.5–5.2)
ALP SERPL-CCNC: 113 U/L (ref 55–135)
ALP SERPL-CCNC: 121 U/L (ref 55–135)
ALT SERPL W/O P-5'-P-CCNC: 20 U/L (ref 10–44)
ALT SERPL W/O P-5'-P-CCNC: 23 U/L (ref 10–44)
ANION GAP SERPL CALC-SCNC: 12 MMOL/L (ref 8–16)
ANION GAP SERPL CALC-SCNC: 13 MMOL/L (ref 8–16)
ANION GAP SERPL CALC-SCNC: 13 MMOL/L (ref 8–16)
ANION GAP SERPL CALC-SCNC: 15 MMOL/L (ref 8–16)
AST SERPL-CCNC: 9 U/L (ref 10–40)
AST SERPL-CCNC: 9 U/L (ref 10–40)
B-OH-BUTYR BLD STRIP-SCNC: 0.3 MMOL/L (ref 0–0.5)
BASOPHILS # BLD AUTO: 0.04 K/UL (ref 0–0.2)
BASOPHILS NFR BLD: 0.5 % (ref 0–1.9)
BILIRUB SERPL-MCNC: <0.1 MG/DL (ref 0.1–1)
BILIRUB SERPL-MCNC: <0.1 MG/DL (ref 0.1–1)
BLD GP AB SCN CELLS X3 SERPL QL: NORMAL
BLD PROD TYP BPU: NORMAL
BLOOD UNIT EXPIRATION DATE: NORMAL
BLOOD UNIT TYPE CODE: 5100
BLOOD UNIT TYPE: NORMAL
BUN SERPL-MCNC: 41 MG/DL (ref 6–20)
BUN SERPL-MCNC: 42 MG/DL (ref 6–20)
BUN SERPL-MCNC: 43 MG/DL (ref 6–20)
BUN SERPL-MCNC: 44 MG/DL (ref 6–20)
CALCIUM SERPL-MCNC: 7.4 MG/DL (ref 8.7–10.5)
CALCIUM SERPL-MCNC: 7.6 MG/DL (ref 8.7–10.5)
CALCIUM SERPL-MCNC: 7.8 MG/DL (ref 8.7–10.5)
CHLORIDE SERPL-SCNC: 100 MMOL/L (ref 95–110)
CHLORIDE SERPL-SCNC: 101 MMOL/L (ref 95–110)
CHLORIDE SERPL-SCNC: 104 MMOL/L (ref 95–110)
CHLORIDE SERPL-SCNC: 104 MMOL/L (ref 95–110)
CHLORIDE SERPL-SCNC: 105 MMOL/L (ref 95–110)
CHLORIDE SERPL-SCNC: 106 MMOL/L (ref 95–110)
CO2 SERPL-SCNC: 17 MMOL/L (ref 23–29)
CO2 SERPL-SCNC: 19 MMOL/L (ref 23–29)
CO2 SERPL-SCNC: 20 MMOL/L (ref 23–29)
CO2 SERPL-SCNC: 21 MMOL/L (ref 23–29)
CODING SYSTEM: NORMAL
CREAT SERPL-MCNC: 6.6 MG/DL (ref 0.5–1.4)
CREAT SERPL-MCNC: 6.7 MG/DL (ref 0.5–1.4)
CREAT SERPL-MCNC: 6.8 MG/DL (ref 0.5–1.4)
CREAT SERPL-MCNC: 6.8 MG/DL (ref 0.5–1.4)
DIFFERENTIAL METHOD: ABNORMAL
DISPENSE STATUS: NORMAL
EOSINOPHIL # BLD AUTO: 0.1 K/UL (ref 0–0.5)
EOSINOPHIL NFR BLD: 0.7 % (ref 0–8)
ERYTHROCYTE [DISTWIDTH] IN BLOOD BY AUTOMATED COUNT: 17 % (ref 11.5–14.5)
EST. GFR  (AFRICAN AMERICAN): 8.9 ML/MIN/1.73 M^2
EST. GFR  (AFRICAN AMERICAN): 8.9 ML/MIN/1.73 M^2
EST. GFR  (AFRICAN AMERICAN): 9 ML/MIN/1.73 M^2
EST. GFR  (AFRICAN AMERICAN): 9.2 ML/MIN/1.73 M^2
EST. GFR  (NON AFRICAN AMERICAN): 7.7 ML/MIN/1.73 M^2
EST. GFR  (NON AFRICAN AMERICAN): 7.7 ML/MIN/1.73 M^2
EST. GFR  (NON AFRICAN AMERICAN): 7.8 ML/MIN/1.73 M^2
EST. GFR  (NON AFRICAN AMERICAN): 8 ML/MIN/1.73 M^2
GLUCOSE SERPL-MCNC: 213 MG/DL (ref 70–110)
GLUCOSE SERPL-MCNC: 329 MG/DL (ref 70–110)
GLUCOSE SERPL-MCNC: 335 MG/DL (ref 70–110)
GLUCOSE SERPL-MCNC: 335 MG/DL (ref 70–110)
GLUCOSE SERPL-MCNC: 594 MG/DL (ref 70–110)
GLUCOSE SERPL-MCNC: 623 MG/DL (ref 70–110)
HCT VFR BLD AUTO: 22.3 % (ref 37–48.5)
HGB BLD-MCNC: 6.9 G/DL (ref 12–16)
IMM GRANULOCYTES # BLD AUTO: 0.03 K/UL (ref 0–0.04)
IMM GRANULOCYTES NFR BLD AUTO: 0.4 % (ref 0–0.5)
LYMPHOCYTES # BLD AUTO: 2 K/UL (ref 1–4.8)
LYMPHOCYTES NFR BLD: 24 % (ref 18–48)
MAGNESIUM SERPL-MCNC: 2 MG/DL (ref 1.6–2.6)
MCH RBC QN AUTO: 28.8 PG (ref 27–31)
MCHC RBC AUTO-ENTMCNC: 30.9 G/DL (ref 32–36)
MCV RBC AUTO: 93 FL (ref 82–98)
MONOCYTES # BLD AUTO: 0.7 K/UL (ref 0.3–1)
MONOCYTES NFR BLD: 7.8 % (ref 4–15)
NEUTROPHILS # BLD AUTO: 5.7 K/UL (ref 1.8–7.7)
NEUTROPHILS NFR BLD: 66.6 % (ref 38–73)
NRBC BLD-RTO: 0 /100 WBC
NUM UNITS TRANS PACKED RBC: NORMAL
PHOSPHATE SERPL-MCNC: 7.4 MG/DL (ref 2.7–4.5)
PLATELET # BLD AUTO: 284 K/UL (ref 150–450)
PMV BLD AUTO: 10.9 FL (ref 9.2–12.9)
POCT GLUCOSE: 228 MG/DL (ref 70–110)
POCT GLUCOSE: 230 MG/DL (ref 70–110)
POCT GLUCOSE: 231 MG/DL (ref 70–110)
POCT GLUCOSE: 232 MG/DL (ref 70–110)
POCT GLUCOSE: 236 MG/DL (ref 70–110)
POCT GLUCOSE: 238 MG/DL (ref 70–110)
POCT GLUCOSE: 242 MG/DL (ref 70–110)
POCT GLUCOSE: 277 MG/DL (ref 70–110)
POCT GLUCOSE: 299 MG/DL (ref 70–110)
POCT GLUCOSE: 325 MG/DL (ref 70–110)
POCT GLUCOSE: 329 MG/DL (ref 70–110)
POCT GLUCOSE: 342 MG/DL (ref 70–110)
POCT GLUCOSE: 342 MG/DL (ref 70–110)
POCT GLUCOSE: 343 MG/DL (ref 70–110)
POCT GLUCOSE: 349 MG/DL (ref 70–110)
POCT GLUCOSE: 349 MG/DL (ref 70–110)
POCT GLUCOSE: 436 MG/DL (ref 70–110)
POCT GLUCOSE: 451 MG/DL (ref 70–110)
POCT GLUCOSE: >500 MG/DL (ref 70–110)
POTASSIUM SERPL-SCNC: 4.5 MMOL/L (ref 3.5–5.1)
POTASSIUM SERPL-SCNC: 4.7 MMOL/L (ref 3.5–5.1)
POTASSIUM SERPL-SCNC: 4.7 MMOL/L (ref 3.5–5.1)
POTASSIUM SERPL-SCNC: 5.9 MMOL/L (ref 3.5–5.1)
PROT SERPL-MCNC: 5.2 G/DL (ref 6–8.4)
PROT SERPL-MCNC: 5.3 G/DL (ref 6–8.4)
RBC # BLD AUTO: 2.4 M/UL (ref 4–5.4)
SODIUM SERPL-SCNC: 132 MMOL/L (ref 136–145)
SODIUM SERPL-SCNC: 132 MMOL/L (ref 136–145)
SODIUM SERPL-SCNC: 137 MMOL/L (ref 136–145)
SODIUM SERPL-SCNC: 139 MMOL/L (ref 136–145)
TB INDURATION 48 - 72 HR READ: 0 MM
WBC # BLD AUTO: 8.49 K/UL (ref 3.9–12.7)

## 2021-05-23 PROCEDURE — 86920 COMPATIBILITY TEST SPIN: CPT | Mod: NTX | Performed by: STUDENT IN AN ORGANIZED HEALTH CARE EDUCATION/TRAINING PROGRAM

## 2021-05-23 PROCEDURE — 63600175 PHARM REV CODE 636 W HCPCS: Mod: NTX | Performed by: STUDENT IN AN ORGANIZED HEALTH CARE EDUCATION/TRAINING PROGRAM

## 2021-05-23 PROCEDURE — 82010 KETONE BODYS QUAN: CPT | Mod: NTX | Performed by: STUDENT IN AN ORGANIZED HEALTH CARE EDUCATION/TRAINING PROGRAM

## 2021-05-23 PROCEDURE — P9016 RBC LEUKOCYTES REDUCED: HCPCS | Mod: NTX | Performed by: STUDENT IN AN ORGANIZED HEALTH CARE EDUCATION/TRAINING PROGRAM

## 2021-05-23 PROCEDURE — 63600175 PHARM REV CODE 636 W HCPCS: Mod: NTX | Performed by: INTERNAL MEDICINE

## 2021-05-23 PROCEDURE — 36415 COLL VENOUS BLD VENIPUNCTURE: CPT | Mod: NTX | Performed by: STUDENT IN AN ORGANIZED HEALTH CARE EDUCATION/TRAINING PROGRAM

## 2021-05-23 PROCEDURE — 85025 COMPLETE CBC W/AUTO DIFF WBC: CPT | Mod: NTX | Performed by: STUDENT IN AN ORGANIZED HEALTH CARE EDUCATION/TRAINING PROGRAM

## 2021-05-23 PROCEDURE — 25000003 PHARM REV CODE 250: Mod: NTX | Performed by: STUDENT IN AN ORGANIZED HEALTH CARE EDUCATION/TRAINING PROGRAM

## 2021-05-23 PROCEDURE — 99233 PR SUBSEQUENT HOSPITAL CARE,LEVL III: ICD-10-PCS | Mod: NTX,,, | Performed by: HOSPITALIST

## 2021-05-23 PROCEDURE — 36430 TRANSFUSION BLD/BLD COMPNT: CPT | Mod: NTX

## 2021-05-23 PROCEDURE — 99233 SBSQ HOSP IP/OBS HIGH 50: CPT | Mod: NTX,,, | Performed by: HOSPITALIST

## 2021-05-23 PROCEDURE — 80048 BASIC METABOLIC PNL TOTAL CA: CPT | Mod: 91,NTX | Performed by: STUDENT IN AN ORGANIZED HEALTH CARE EDUCATION/TRAINING PROGRAM

## 2021-05-23 PROCEDURE — 20600001 HC STEP DOWN PRIVATE ROOM: Mod: NTX

## 2021-05-23 PROCEDURE — 84100 ASSAY OF PHOSPHORUS: CPT | Mod: NTX | Performed by: STUDENT IN AN ORGANIZED HEALTH CARE EDUCATION/TRAINING PROGRAM

## 2021-05-23 PROCEDURE — 83735 ASSAY OF MAGNESIUM: CPT | Mod: NTX | Performed by: STUDENT IN AN ORGANIZED HEALTH CARE EDUCATION/TRAINING PROGRAM

## 2021-05-23 PROCEDURE — 25000003 PHARM REV CODE 250: Mod: NTX | Performed by: INTERNAL MEDICINE

## 2021-05-23 PROCEDURE — 80053 COMPREHEN METABOLIC PANEL: CPT | Mod: 91,NTX | Performed by: STUDENT IN AN ORGANIZED HEALTH CARE EDUCATION/TRAINING PROGRAM

## 2021-05-23 PROCEDURE — 86900 BLOOD TYPING SEROLOGIC ABO: CPT | Mod: NTX | Performed by: STUDENT IN AN ORGANIZED HEALTH CARE EDUCATION/TRAINING PROGRAM

## 2021-05-23 RX ORDER — SODIUM CHLORIDE 9 MG/ML
INJECTION, SOLUTION INTRAVENOUS CONTINUOUS
Status: DISCONTINUED | OUTPATIENT
Start: 2021-05-23 | End: 2021-05-23

## 2021-05-23 RX ORDER — HYDROCODONE BITARTRATE AND ACETAMINOPHEN 500; 5 MG/1; MG/1
TABLET ORAL
Status: DISCONTINUED | OUTPATIENT
Start: 2021-05-23 | End: 2021-05-25

## 2021-05-23 RX ADMIN — FERROUS SULFATE TAB EC 325 MG (65 MG FE EQUIVALENT) 325 MG: 325 (65 FE) TABLET DELAYED RESPONSE at 09:05

## 2021-05-23 RX ADMIN — FERROUS SULFATE TAB EC 325 MG (65 MG FE EQUIVALENT) 325 MG: 325 (65 FE) TABLET DELAYED RESPONSE at 08:05

## 2021-05-23 RX ADMIN — SODIUM CHLORIDE 500 ML: 0.9 INJECTION, SOLUTION INTRAVENOUS at 01:05

## 2021-05-23 RX ADMIN — FUROSEMIDE 160 MG: 10 INJECTION, SOLUTION INTRAMUSCULAR; INTRAVENOUS at 06:05

## 2021-05-23 RX ADMIN — SODIUM BICARBONATE 650 MG TABLET 1300 MG: at 08:05

## 2021-05-23 RX ADMIN — CHLOROTHIAZIDE SODIUM 250 MG: 500 INJECTION, POWDER, LYOPHILIZED, FOR SOLUTION INTRAVENOUS at 03:05

## 2021-05-23 RX ADMIN — MUPIROCIN: 20 OINTMENT TOPICAL at 08:05

## 2021-05-23 RX ADMIN — CARVEDILOL 25 MG: 25 TABLET, FILM COATED ORAL at 09:05

## 2021-05-23 RX ADMIN — SEVELAMER CARBONATE 1600 MG: 800 TABLET, FILM COATED ORAL at 08:05

## 2021-05-23 RX ADMIN — SODIUM CHLORIDE 1.2 UNITS/HR: 9 INJECTION, SOLUTION INTRAVENOUS at 03:05

## 2021-05-23 RX ADMIN — CALCITRIOL CAPSULES 0.25 MCG 0.5 MCG: 0.25 CAPSULE ORAL at 08:05

## 2021-05-23 RX ADMIN — HEPARIN SODIUM 5000 UNITS: 5000 INJECTION INTRAVENOUS; SUBCUTANEOUS at 09:05

## 2021-05-23 RX ADMIN — NIFEDIPINE 60 MG: 60 TABLET, FILM COATED, EXTENDED RELEASE ORAL at 12:05

## 2021-05-23 RX ADMIN — HEPARIN SODIUM 5000 UNITS: 5000 INJECTION INTRAVENOUS; SUBCUTANEOUS at 06:05

## 2021-05-23 RX ADMIN — SODIUM CHLORIDE: 0.9 INJECTION, SOLUTION INTRAVENOUS at 04:05

## 2021-05-23 RX ADMIN — ESCITALOPRAM OXALATE 20 MG: 20 TABLET ORAL at 08:05

## 2021-05-23 RX ADMIN — SODIUM BICARBONATE 650 MG TABLET 1300 MG: at 04:05

## 2021-05-23 RX ADMIN — CHLOROTHIAZIDE SODIUM 250 MG: 500 INJECTION, POWDER, LYOPHILIZED, FOR SOLUTION INTRAVENOUS at 11:05

## 2021-05-23 RX ADMIN — CHLOROTHIAZIDE SODIUM 250 MG: 500 INJECTION, POWDER, LYOPHILIZED, FOR SOLUTION INTRAVENOUS at 10:05

## 2021-05-23 RX ADMIN — SODIUM BICARBONATE 650 MG TABLET 1300 MG: at 09:05

## 2021-05-23 RX ADMIN — NIFEDIPINE 60 MG: 60 TABLET, FILM COATED, EXTENDED RELEASE ORAL at 11:05

## 2021-05-23 RX ADMIN — CARVEDILOL 25 MG: 25 TABLET, FILM COATED ORAL at 08:05

## 2021-05-23 RX ADMIN — SODIUM CHLORIDE 3 UNITS/HR: 9 INJECTION, SOLUTION INTRAVENOUS at 03:05

## 2021-05-23 RX ADMIN — FUROSEMIDE 160 MG: 10 INJECTION, SOLUTION INTRAMUSCULAR; INTRAVENOUS at 03:05

## 2021-05-23 RX ADMIN — MUPIROCIN: 20 OINTMENT TOPICAL at 09:05

## 2021-05-23 RX ADMIN — FUROSEMIDE 160 MG: 10 INJECTION, SOLUTION INTRAMUSCULAR; INTRAVENOUS at 08:05

## 2021-05-23 RX ADMIN — HEPARIN SODIUM 5000 UNITS: 5000 INJECTION INTRAVENOUS; SUBCUTANEOUS at 04:05

## 2021-05-23 RX ADMIN — ASPIRIN 81 MG: 81 TABLET, COATED ORAL at 09:05

## 2021-05-24 ENCOUNTER — PATIENT OUTREACH (OUTPATIENT)
Dept: ADMINISTRATIVE | Facility: OTHER | Age: 26
End: 2021-05-24

## 2021-05-24 LAB
ALBUMIN SERPL BCP-MCNC: 1.6 G/DL (ref 3.5–5.2)
ALP SERPL-CCNC: 107 U/L (ref 55–135)
ALT SERPL W/O P-5'-P-CCNC: 17 U/L (ref 10–44)
ANION GAP SERPL CALC-SCNC: 13 MMOL/L (ref 8–16)
ANION GAP SERPL CALC-SCNC: 13 MMOL/L (ref 8–16)
ANION GAP SERPL CALC-SCNC: 14 MMOL/L (ref 8–16)
AST SERPL-CCNC: 21 U/L (ref 10–40)
BASOPHILS # BLD AUTO: 0.05 K/UL (ref 0–0.2)
BASOPHILS NFR BLD: 0.6 % (ref 0–1.9)
BILIRUB SERPL-MCNC: 0.1 MG/DL (ref 0.1–1)
BUN SERPL-MCNC: 43 MG/DL (ref 6–20)
BUN SERPL-MCNC: 44 MG/DL (ref 6–20)
CALCIUM SERPL-MCNC: 7.7 MG/DL (ref 8.7–10.5)
CALCIUM SERPL-MCNC: 7.7 MG/DL (ref 8.7–10.5)
CALCIUM SERPL-MCNC: 7.8 MG/DL (ref 8.7–10.5)
CALCIUM SERPL-MCNC: 8.2 MG/DL (ref 8.7–10.5)
CALCIUM SERPL-MCNC: 8.2 MG/DL (ref 8.7–10.5)
CHLORIDE SERPL-SCNC: 104 MMOL/L (ref 95–110)
CHLORIDE SERPL-SCNC: 105 MMOL/L (ref 95–110)
CHLORIDE SERPL-SCNC: 107 MMOL/L (ref 95–110)
CO2 SERPL-SCNC: 19 MMOL/L (ref 23–29)
CO2 SERPL-SCNC: 20 MMOL/L (ref 23–29)
CREAT SERPL-MCNC: 7 MG/DL (ref 0.5–1.4)
CREAT SERPL-MCNC: 7 MG/DL (ref 0.5–1.4)
CREAT SERPL-MCNC: 7.1 MG/DL (ref 0.5–1.4)
CREAT SERPL-MCNC: 7.1 MG/DL (ref 0.5–1.4)
CREAT SERPL-MCNC: 7.2 MG/DL (ref 0.5–1.4)
DIFFERENTIAL METHOD: ABNORMAL
EOSINOPHIL # BLD AUTO: 0.2 K/UL (ref 0–0.5)
EOSINOPHIL NFR BLD: 2 % (ref 0–8)
ERYTHROCYTE [DISTWIDTH] IN BLOOD BY AUTOMATED COUNT: 15.9 % (ref 11.5–14.5)
EST. GFR  (AFRICAN AMERICAN): 8.3 ML/MIN/1.73 M^2
EST. GFR  (AFRICAN AMERICAN): 8.4 ML/MIN/1.73 M^2
EST. GFR  (AFRICAN AMERICAN): 8.4 ML/MIN/1.73 M^2
EST. GFR  (AFRICAN AMERICAN): 8.5 ML/MIN/1.73 M^2
EST. GFR  (AFRICAN AMERICAN): 8.5 ML/MIN/1.73 M^2
EST. GFR  (NON AFRICAN AMERICAN): 7.2 ML/MIN/1.73 M^2
EST. GFR  (NON AFRICAN AMERICAN): 7.3 ML/MIN/1.73 M^2
EST. GFR  (NON AFRICAN AMERICAN): 7.3 ML/MIN/1.73 M^2
EST. GFR  (NON AFRICAN AMERICAN): 7.4 ML/MIN/1.73 M^2
EST. GFR  (NON AFRICAN AMERICAN): 7.4 ML/MIN/1.73 M^2
GLUCOSE SERPL-MCNC: 215 MG/DL (ref 70–110)
GLUCOSE SERPL-MCNC: 228 MG/DL (ref 70–110)
GLUCOSE SERPL-MCNC: 238 MG/DL (ref 70–110)
GLUCOSE SERPL-MCNC: 243 MG/DL (ref 70–110)
GLUCOSE SERPL-MCNC: 243 MG/DL (ref 70–110)
HCT VFR BLD AUTO: 26.4 % (ref 37–48.5)
HGB BLD-MCNC: 8.5 G/DL (ref 12–16)
IMM GRANULOCYTES # BLD AUTO: 0.08 K/UL (ref 0–0.04)
IMM GRANULOCYTES NFR BLD AUTO: 0.9 % (ref 0–0.5)
LYMPHOCYTES # BLD AUTO: 3.2 K/UL (ref 1–4.8)
LYMPHOCYTES NFR BLD: 36.7 % (ref 18–48)
MAGNESIUM SERPL-MCNC: 2.1 MG/DL (ref 1.6–2.6)
MCH RBC QN AUTO: 29.3 PG (ref 27–31)
MCHC RBC AUTO-ENTMCNC: 32.2 G/DL (ref 32–36)
MCV RBC AUTO: 91 FL (ref 82–98)
MONOCYTES # BLD AUTO: 0.9 K/UL (ref 0.3–1)
MONOCYTES NFR BLD: 10.2 % (ref 4–15)
NEUTROPHILS # BLD AUTO: 4.3 K/UL (ref 1.8–7.7)
NEUTROPHILS NFR BLD: 49.6 % (ref 38–73)
NRBC BLD-RTO: 0 /100 WBC
PHOSPHATE SERPL-MCNC: 7.8 MG/DL (ref 2.7–4.5)
PLATELET # BLD AUTO: 240 K/UL (ref 150–450)
PMV BLD AUTO: 11.8 FL (ref 9.2–12.9)
POCT GLUCOSE: 150 MG/DL (ref 70–110)
POCT GLUCOSE: 154 MG/DL (ref 70–110)
POCT GLUCOSE: 164 MG/DL (ref 70–110)
POCT GLUCOSE: 205 MG/DL (ref 70–110)
POCT GLUCOSE: 206 MG/DL (ref 70–110)
POCT GLUCOSE: 222 MG/DL (ref 70–110)
POCT GLUCOSE: 224 MG/DL (ref 70–110)
POCT GLUCOSE: 225 MG/DL (ref 70–110)
POCT GLUCOSE: 228 MG/DL (ref 70–110)
POCT GLUCOSE: 232 MG/DL (ref 70–110)
POCT GLUCOSE: 241 MG/DL (ref 70–110)
POCT GLUCOSE: 245 MG/DL (ref 70–110)
POCT GLUCOSE: 271 MG/DL (ref 70–110)
POCT GLUCOSE: 280 MG/DL (ref 70–110)
POCT GLUCOSE: 313 MG/DL (ref 70–110)
POTASSIUM SERPL-SCNC: 4.5 MMOL/L (ref 3.5–5.1)
POTASSIUM SERPL-SCNC: 4.6 MMOL/L (ref 3.5–5.1)
POTASSIUM SERPL-SCNC: 4.8 MMOL/L (ref 3.5–5.1)
POTASSIUM SERPL-SCNC: 4.8 MMOL/L (ref 3.5–5.1)
POTASSIUM SERPL-SCNC: 5 MMOL/L (ref 3.5–5.1)
PROT SERPL-MCNC: 5.2 G/DL (ref 6–8.4)
RBC # BLD AUTO: 2.9 M/UL (ref 4–5.4)
SODIUM SERPL-SCNC: 136 MMOL/L (ref 136–145)
SODIUM SERPL-SCNC: 138 MMOL/L (ref 136–145)
SODIUM SERPL-SCNC: 140 MMOL/L (ref 136–145)
WBC # BLD AUTO: 8.66 K/UL (ref 3.9–12.7)

## 2021-05-24 PROCEDURE — 63600175 PHARM REV CODE 636 W HCPCS: Mod: NTX | Performed by: INTERNAL MEDICINE

## 2021-05-24 PROCEDURE — 36415 COLL VENOUS BLD VENIPUNCTURE: CPT | Mod: NTX | Performed by: STUDENT IN AN ORGANIZED HEALTH CARE EDUCATION/TRAINING PROGRAM

## 2021-05-24 PROCEDURE — 80048 BASIC METABOLIC PNL TOTAL CA: CPT | Mod: 91,NTX | Performed by: STUDENT IN AN ORGANIZED HEALTH CARE EDUCATION/TRAINING PROGRAM

## 2021-05-24 PROCEDURE — 90935 HEMODIALYSIS ONE EVALUATION: CPT | Mod: NTX

## 2021-05-24 PROCEDURE — 84100 ASSAY OF PHOSPHORUS: CPT | Mod: NTX | Performed by: STUDENT IN AN ORGANIZED HEALTH CARE EDUCATION/TRAINING PROGRAM

## 2021-05-24 PROCEDURE — 63600175 PHARM REV CODE 636 W HCPCS: Mod: NTX | Performed by: STUDENT IN AN ORGANIZED HEALTH CARE EDUCATION/TRAINING PROGRAM

## 2021-05-24 PROCEDURE — 94761 N-INVAS EAR/PLS OXIMETRY MLT: CPT | Mod: NTX

## 2021-05-24 PROCEDURE — 80048 BASIC METABOLIC PNL TOTAL CA: CPT | Mod: NTX | Performed by: STUDENT IN AN ORGANIZED HEALTH CARE EDUCATION/TRAINING PROGRAM

## 2021-05-24 PROCEDURE — 20600001 HC STEP DOWN PRIVATE ROOM: Mod: NTX

## 2021-05-24 PROCEDURE — 80053 COMPREHEN METABOLIC PANEL: CPT | Mod: NTX | Performed by: STUDENT IN AN ORGANIZED HEALTH CARE EDUCATION/TRAINING PROGRAM

## 2021-05-24 PROCEDURE — 85025 COMPLETE CBC W/AUTO DIFF WBC: CPT | Mod: NTX | Performed by: STUDENT IN AN ORGANIZED HEALTH CARE EDUCATION/TRAINING PROGRAM

## 2021-05-24 PROCEDURE — 99233 PR SUBSEQUENT HOSPITAL CARE,LEVL III: ICD-10-PCS | Mod: NTX,,, | Performed by: HOSPITALIST

## 2021-05-24 PROCEDURE — 83735 ASSAY OF MAGNESIUM: CPT | Mod: NTX | Performed by: STUDENT IN AN ORGANIZED HEALTH CARE EDUCATION/TRAINING PROGRAM

## 2021-05-24 PROCEDURE — 25000003 PHARM REV CODE 250: Mod: NTX | Performed by: HOSPITALIST

## 2021-05-24 PROCEDURE — 25000003 PHARM REV CODE 250: Mod: NTX | Performed by: STUDENT IN AN ORGANIZED HEALTH CARE EDUCATION/TRAINING PROGRAM

## 2021-05-24 PROCEDURE — 90935 PR HEMODIALYSIS, ONE EVALUATION: ICD-10-PCS | Mod: NTX,,, | Performed by: INTERNAL MEDICINE

## 2021-05-24 PROCEDURE — 25000003 PHARM REV CODE 250: Mod: NTX | Performed by: INTERNAL MEDICINE

## 2021-05-24 PROCEDURE — 99233 SBSQ HOSP IP/OBS HIGH 50: CPT | Mod: NTX,,, | Performed by: HOSPITALIST

## 2021-05-24 PROCEDURE — 90935 HEMODIALYSIS ONE EVALUATION: CPT | Mod: NTX,,, | Performed by: INTERNAL MEDICINE

## 2021-05-24 PROCEDURE — C9399 UNCLASSIFIED DRUGS OR BIOLOG: HCPCS | Mod: NTX | Performed by: STUDENT IN AN ORGANIZED HEALTH CARE EDUCATION/TRAINING PROGRAM

## 2021-05-24 PROCEDURE — 63600175 PHARM REV CODE 636 W HCPCS: Mod: NTX | Performed by: GENERAL PRACTICE

## 2021-05-24 RX ORDER — SODIUM CHLORIDE 9 MG/ML
INJECTION, SOLUTION INTRAVENOUS ONCE
Status: CANCELLED | OUTPATIENT
Start: 2021-05-24 | End: 2021-05-24

## 2021-05-24 RX ORDER — INSULIN ASPART 100 [IU]/ML
7 INJECTION, SOLUTION INTRAVENOUS; SUBCUTANEOUS
Status: DISCONTINUED | OUTPATIENT
Start: 2021-05-24 | End: 2021-05-28 | Stop reason: HOSPADM

## 2021-05-24 RX ORDER — SODIUM CHLORIDE 9 MG/ML
INJECTION, SOLUTION INTRAVENOUS
Status: CANCELLED | OUTPATIENT
Start: 2021-05-24

## 2021-05-24 RX ADMIN — HEPARIN SODIUM 5000 UNITS: 5000 INJECTION INTRAVENOUS; SUBCUTANEOUS at 09:05

## 2021-05-24 RX ADMIN — FERROUS SULFATE TAB EC 325 MG (65 MG FE EQUIVALENT) 325 MG: 325 (65 FE) TABLET DELAYED RESPONSE at 08:05

## 2021-05-24 RX ADMIN — ESCITALOPRAM OXALATE 20 MG: 20 TABLET ORAL at 08:05

## 2021-05-24 RX ADMIN — CHLOROTHIAZIDE SODIUM 250 MG: 500 INJECTION, POWDER, LYOPHILIZED, FOR SOLUTION INTRAVENOUS at 09:05

## 2021-05-24 RX ADMIN — SODIUM CHLORIDE 0.3 UNITS/HR: 9 INJECTION, SOLUTION INTRAVENOUS at 11:05

## 2021-05-24 RX ADMIN — FUROSEMIDE 160 MG: 10 INJECTION, SOLUTION INTRAMUSCULAR; INTRAVENOUS at 09:05

## 2021-05-24 RX ADMIN — HEPARIN SODIUM 1000 UNITS: 1000 INJECTION, SOLUTION INTRAVENOUS; SUBCUTANEOUS at 04:05

## 2021-05-24 RX ADMIN — INSULIN DETEMIR 9 UNITS: 100 INJECTION, SOLUTION SUBCUTANEOUS at 10:05

## 2021-05-24 RX ADMIN — NIFEDIPINE 60 MG: 60 TABLET, FILM COATED, EXTENDED RELEASE ORAL at 09:05

## 2021-05-24 RX ADMIN — MUPIROCIN: 20 OINTMENT TOPICAL at 09:05

## 2021-05-24 RX ADMIN — HEPARIN SODIUM 5000 UNITS: 5000 INJECTION INTRAVENOUS; SUBCUTANEOUS at 06:05

## 2021-05-24 RX ADMIN — INSULIN ASPART 7 UNITS: 100 INJECTION, SOLUTION INTRAVENOUS; SUBCUTANEOUS at 03:05

## 2021-05-24 RX ADMIN — INSULIN DETEMIR 9 UNITS: 100 INJECTION, SOLUTION SUBCUTANEOUS at 09:05

## 2021-05-24 RX ADMIN — FERROUS SULFATE TAB EC 325 MG (65 MG FE EQUIVALENT) 325 MG: 325 (65 FE) TABLET DELAYED RESPONSE at 09:05

## 2021-05-24 RX ADMIN — SODIUM BICARBONATE 650 MG TABLET 1300 MG: at 08:05

## 2021-05-24 RX ADMIN — NIFEDIPINE 60 MG: 60 TABLET, FILM COATED, EXTENDED RELEASE ORAL at 08:05

## 2021-05-24 RX ADMIN — FUROSEMIDE 160 MG: 10 INJECTION, SOLUTION INTRAMUSCULAR; INTRAVENOUS at 12:05

## 2021-05-24 RX ADMIN — CALCITRIOL CAPSULES 0.25 MCG 0.5 MCG: 0.25 CAPSULE ORAL at 08:05

## 2021-05-24 RX ADMIN — CARVEDILOL 25 MG: 25 TABLET, FILM COATED ORAL at 09:05

## 2021-05-24 RX ADMIN — INSULIN ASPART 7 UNITS: 100 INJECTION, SOLUTION INTRAVENOUS; SUBCUTANEOUS at 07:05

## 2021-05-24 RX ADMIN — CARVEDILOL 25 MG: 25 TABLET, FILM COATED ORAL at 08:05

## 2021-05-24 RX ADMIN — ASPIRIN 81 MG: 81 TABLET, COATED ORAL at 09:05

## 2021-05-25 LAB
ALBUMIN SERPL BCP-MCNC: 1.6 G/DL (ref 3.5–5.2)
ALBUMIN SERPL BCP-MCNC: 1.7 G/DL (ref 3.5–5.2)
ALP SERPL-CCNC: 118 U/L (ref 55–135)
ALP SERPL-CCNC: 125 U/L (ref 55–135)
ALT SERPL W/O P-5'-P-CCNC: 20 U/L (ref 10–44)
ALT SERPL W/O P-5'-P-CCNC: 21 U/L (ref 10–44)
ANION GAP SERPL CALC-SCNC: 11 MMOL/L (ref 8–16)
ANION GAP SERPL CALC-SCNC: 12 MMOL/L (ref 8–16)
AST SERPL-CCNC: 42 U/L (ref 10–40)
AST SERPL-CCNC: 44 U/L (ref 10–40)
BILIRUB SERPL-MCNC: 0.1 MG/DL (ref 0.1–1)
BILIRUB SERPL-MCNC: 0.1 MG/DL (ref 0.1–1)
BUN SERPL-MCNC: 27 MG/DL (ref 6–20)
BUN SERPL-MCNC: 28 MG/DL (ref 6–20)
CALCIUM SERPL-MCNC: 7.9 MG/DL (ref 8.7–10.5)
CALCIUM SERPL-MCNC: 8.3 MG/DL (ref 8.7–10.5)
CHLORIDE SERPL-SCNC: 104 MMOL/L (ref 95–110)
CHLORIDE SERPL-SCNC: 105 MMOL/L (ref 95–110)
CO2 SERPL-SCNC: 21 MMOL/L (ref 23–29)
CO2 SERPL-SCNC: 21 MMOL/L (ref 23–29)
CREAT SERPL-MCNC: 4.9 MG/DL (ref 0.5–1.4)
CREAT SERPL-MCNC: 5.2 MG/DL (ref 0.5–1.4)
EST. GFR  (AFRICAN AMERICAN): 12.2 ML/MIN/1.73 M^2
EST. GFR  (AFRICAN AMERICAN): 13.2 ML/MIN/1.73 M^2
EST. GFR  (NON AFRICAN AMERICAN): 10.6 ML/MIN/1.73 M^2
EST. GFR  (NON AFRICAN AMERICAN): 11.4 ML/MIN/1.73 M^2
GLUCOSE SERPL-MCNC: 171 MG/DL (ref 70–110)
GLUCOSE SERPL-MCNC: 222 MG/DL (ref 70–110)
MAGNESIUM SERPL-MCNC: 2.1 MG/DL (ref 1.6–2.6)
PHOSPHATE SERPL-MCNC: 6.4 MG/DL (ref 2.7–4.5)
POCT GLUCOSE: 111 MG/DL (ref 70–110)
POCT GLUCOSE: 180 MG/DL (ref 70–110)
POCT GLUCOSE: 184 MG/DL (ref 70–110)
POCT GLUCOSE: 231 MG/DL (ref 70–110)
POCT GLUCOSE: 232 MG/DL (ref 70–110)
POCT GLUCOSE: 256 MG/DL (ref 70–110)
POTASSIUM SERPL-SCNC: 4.4 MMOL/L (ref 3.5–5.1)
POTASSIUM SERPL-SCNC: 4.6 MMOL/L (ref 3.5–5.1)
PROT SERPL-MCNC: 5.4 G/DL (ref 6–8.4)
PROT SERPL-MCNC: 5.7 G/DL (ref 6–8.4)
SODIUM SERPL-SCNC: 136 MMOL/L (ref 136–145)
SODIUM SERPL-SCNC: 138 MMOL/L (ref 136–145)

## 2021-05-25 PROCEDURE — 84100 ASSAY OF PHOSPHORUS: CPT | Mod: NTX | Performed by: STUDENT IN AN ORGANIZED HEALTH CARE EDUCATION/TRAINING PROGRAM

## 2021-05-25 PROCEDURE — 36415 COLL VENOUS BLD VENIPUNCTURE: CPT | Mod: NTX | Performed by: STUDENT IN AN ORGANIZED HEALTH CARE EDUCATION/TRAINING PROGRAM

## 2021-05-25 PROCEDURE — C9399 UNCLASSIFIED DRUGS OR BIOLOG: HCPCS | Mod: NTX | Performed by: STUDENT IN AN ORGANIZED HEALTH CARE EDUCATION/TRAINING PROGRAM

## 2021-05-25 PROCEDURE — 25000003 PHARM REV CODE 250: Mod: NTX | Performed by: STUDENT IN AN ORGANIZED HEALTH CARE EDUCATION/TRAINING PROGRAM

## 2021-05-25 PROCEDURE — 20600001 HC STEP DOWN PRIVATE ROOM: Mod: NTX

## 2021-05-25 PROCEDURE — 80053 COMPREHEN METABOLIC PANEL: CPT | Mod: NTX | Performed by: STUDENT IN AN ORGANIZED HEALTH CARE EDUCATION/TRAINING PROGRAM

## 2021-05-25 PROCEDURE — 99231 SBSQ HOSP IP/OBS SF/LOW 25: CPT | Mod: NTX,,, | Performed by: HOSPITALIST

## 2021-05-25 PROCEDURE — 99232 SBSQ HOSP IP/OBS MODERATE 35: CPT | Mod: NTX,,, | Performed by: INTERNAL MEDICINE

## 2021-05-25 PROCEDURE — 99231 PR SUBSEQUENT HOSPITAL CARE,LEVL I: ICD-10-PCS | Mod: NTX,,, | Performed by: HOSPITALIST

## 2021-05-25 PROCEDURE — 63600175 PHARM REV CODE 636 W HCPCS: Mod: NTX | Performed by: STUDENT IN AN ORGANIZED HEALTH CARE EDUCATION/TRAINING PROGRAM

## 2021-05-25 PROCEDURE — 99232 PR SUBSEQUENT HOSPITAL CARE,LEVL II: ICD-10-PCS | Mod: NTX,,, | Performed by: INTERNAL MEDICINE

## 2021-05-25 PROCEDURE — 83735 ASSAY OF MAGNESIUM: CPT | Mod: NTX | Performed by: STUDENT IN AN ORGANIZED HEALTH CARE EDUCATION/TRAINING PROGRAM

## 2021-05-25 RX ADMIN — CARVEDILOL 25 MG: 25 TABLET, FILM COATED ORAL at 09:05

## 2021-05-25 RX ADMIN — MUPIROCIN: 20 OINTMENT TOPICAL at 10:05

## 2021-05-25 RX ADMIN — FERROUS SULFATE TAB EC 325 MG (65 MG FE EQUIVALENT) 325 MG: 325 (65 FE) TABLET DELAYED RESPONSE at 09:05

## 2021-05-25 RX ADMIN — ASPIRIN 81 MG: 81 TABLET, COATED ORAL at 09:05

## 2021-05-25 RX ADMIN — INSULIN ASPART 7 UNITS: 100 INJECTION, SOLUTION INTRAVENOUS; SUBCUTANEOUS at 09:05

## 2021-05-25 RX ADMIN — INSULIN ASPART 7 UNITS: 100 INJECTION, SOLUTION INTRAVENOUS; SUBCUTANEOUS at 07:05

## 2021-05-25 RX ADMIN — HEPARIN SODIUM 5000 UNITS: 5000 INJECTION INTRAVENOUS; SUBCUTANEOUS at 03:05

## 2021-05-25 RX ADMIN — ESCITALOPRAM OXALATE 20 MG: 20 TABLET ORAL at 09:05

## 2021-05-25 RX ADMIN — NIFEDIPINE 60 MG: 60 TABLET, FILM COATED, EXTENDED RELEASE ORAL at 09:05

## 2021-05-25 RX ADMIN — HEPARIN SODIUM 5000 UNITS: 5000 INJECTION INTRAVENOUS; SUBCUTANEOUS at 09:05

## 2021-05-25 RX ADMIN — INSULIN DETEMIR 10 UNITS: 100 INJECTION, SOLUTION SUBCUTANEOUS at 11:05

## 2021-05-25 RX ADMIN — SEVELAMER CARBONATE 1600 MG: 800 TABLET, FILM COATED ORAL at 09:05

## 2021-05-25 RX ADMIN — CALCITRIOL CAPSULES 0.25 MCG 0.5 MCG: 0.25 CAPSULE ORAL at 09:05

## 2021-05-25 RX ADMIN — INSULIN DETEMIR 10 UNITS: 100 INJECTION, SOLUTION SUBCUTANEOUS at 10:05

## 2021-05-25 RX ADMIN — MUPIROCIN: 20 OINTMENT TOPICAL at 09:05

## 2021-05-25 RX ADMIN — INSULIN ASPART 7 UNITS: 100 INJECTION, SOLUTION INTRAVENOUS; SUBCUTANEOUS at 03:05

## 2021-05-25 RX ADMIN — HEPARIN SODIUM 5000 UNITS: 5000 INJECTION INTRAVENOUS; SUBCUTANEOUS at 10:05

## 2021-05-26 ENCOUNTER — TELEPHONE (OUTPATIENT)
Dept: HEPATOLOGY | Facility: CLINIC | Age: 26
End: 2021-05-26

## 2021-05-26 LAB
ALBUMIN SERPL BCP-MCNC: 1.7 G/DL (ref 3.5–5.2)
ALP SERPL-CCNC: 110 U/L (ref 55–135)
ALT SERPL W/O P-5'-P-CCNC: 25 U/L (ref 10–44)
ANION GAP SERPL CALC-SCNC: 12 MMOL/L (ref 8–16)
AST SERPL-CCNC: 50 U/L (ref 10–40)
BILIRUB SERPL-MCNC: 0.1 MG/DL (ref 0.1–1)
BUN SERPL-MCNC: 31 MG/DL (ref 6–20)
CALCIUM SERPL-MCNC: 8.2 MG/DL (ref 8.7–10.5)
CHLORIDE SERPL-SCNC: 104 MMOL/L (ref 95–110)
CO2 SERPL-SCNC: 21 MMOL/L (ref 23–29)
CREAT SERPL-MCNC: 5.4 MG/DL (ref 0.5–1.4)
EST. GFR  (AFRICAN AMERICAN): 11.7 ML/MIN/1.73 M^2
EST. GFR  (NON AFRICAN AMERICAN): 10.1 ML/MIN/1.73 M^2
GLUCOSE SERPL-MCNC: 218 MG/DL (ref 70–110)
MAGNESIUM SERPL-MCNC: 2.1 MG/DL (ref 1.6–2.6)
PHOSPHATE SERPL-MCNC: 6.4 MG/DL (ref 2.7–4.5)
POCT GLUCOSE: 178 MG/DL (ref 70–110)
POCT GLUCOSE: 214 MG/DL (ref 70–110)
POCT GLUCOSE: 214 MG/DL (ref 70–110)
POCT GLUCOSE: 290 MG/DL (ref 70–110)
POTASSIUM SERPL-SCNC: 4.7 MMOL/L (ref 3.5–5.1)
PROT SERPL-MCNC: 5.3 G/DL (ref 6–8.4)
SODIUM SERPL-SCNC: 137 MMOL/L (ref 136–145)

## 2021-05-26 PROCEDURE — 83735 ASSAY OF MAGNESIUM: CPT | Mod: NTX | Performed by: STUDENT IN AN ORGANIZED HEALTH CARE EDUCATION/TRAINING PROGRAM

## 2021-05-26 PROCEDURE — 99231 PR SUBSEQUENT HOSPITAL CARE,LEVL I: ICD-10-PCS | Mod: NTX,,, | Performed by: HOSPITALIST

## 2021-05-26 PROCEDURE — 99231 SBSQ HOSP IP/OBS SF/LOW 25: CPT | Mod: NTX,,, | Performed by: HOSPITALIST

## 2021-05-26 PROCEDURE — 90935 PR HEMODIALYSIS, ONE EVALUATION: ICD-10-PCS | Mod: NTX,,, | Performed by: INTERNAL MEDICINE

## 2021-05-26 PROCEDURE — 25000003 PHARM REV CODE 250: Mod: NTX | Performed by: STUDENT IN AN ORGANIZED HEALTH CARE EDUCATION/TRAINING PROGRAM

## 2021-05-26 PROCEDURE — 84100 ASSAY OF PHOSPHORUS: CPT | Mod: NTX | Performed by: STUDENT IN AN ORGANIZED HEALTH CARE EDUCATION/TRAINING PROGRAM

## 2021-05-26 PROCEDURE — 90935 HEMODIALYSIS ONE EVALUATION: CPT | Mod: NTX

## 2021-05-26 PROCEDURE — 90935 HEMODIALYSIS ONE EVALUATION: CPT | Mod: NTX,,, | Performed by: INTERNAL MEDICINE

## 2021-05-26 PROCEDURE — 63600175 PHARM REV CODE 636 W HCPCS: Mod: NTX | Performed by: STUDENT IN AN ORGANIZED HEALTH CARE EDUCATION/TRAINING PROGRAM

## 2021-05-26 PROCEDURE — 20600001 HC STEP DOWN PRIVATE ROOM: Mod: NTX

## 2021-05-26 PROCEDURE — 36415 COLL VENOUS BLD VENIPUNCTURE: CPT | Mod: NTX | Performed by: STUDENT IN AN ORGANIZED HEALTH CARE EDUCATION/TRAINING PROGRAM

## 2021-05-26 PROCEDURE — 63600175 PHARM REV CODE 636 W HCPCS: Mod: NTX | Performed by: GENERAL PRACTICE

## 2021-05-26 PROCEDURE — 80053 COMPREHEN METABOLIC PANEL: CPT | Mod: NTX | Performed by: STUDENT IN AN ORGANIZED HEALTH CARE EDUCATION/TRAINING PROGRAM

## 2021-05-26 RX ORDER — SODIUM CHLORIDE 9 MG/ML
INJECTION, SOLUTION INTRAVENOUS
Status: CANCELLED | OUTPATIENT
Start: 2021-05-26

## 2021-05-26 RX ORDER — SODIUM CHLORIDE 9 MG/ML
INJECTION, SOLUTION INTRAVENOUS ONCE
Status: CANCELLED | OUTPATIENT
Start: 2021-05-26 | End: 2021-05-26

## 2021-05-26 RX ADMIN — CALCITRIOL CAPSULES 0.25 MCG 0.5 MCG: 0.25 CAPSULE ORAL at 09:05

## 2021-05-26 RX ADMIN — CARVEDILOL 25 MG: 25 TABLET, FILM COATED ORAL at 09:05

## 2021-05-26 RX ADMIN — INSULIN ASPART 7 UNITS: 100 INJECTION, SOLUTION INTRAVENOUS; SUBCUTANEOUS at 03:05

## 2021-05-26 RX ADMIN — INSULIN DETEMIR 10 UNITS: 100 INJECTION, SOLUTION SUBCUTANEOUS at 09:05

## 2021-05-26 RX ADMIN — HEPARIN SODIUM 5000 UNITS: 5000 INJECTION INTRAVENOUS; SUBCUTANEOUS at 02:05

## 2021-05-26 RX ADMIN — HEPARIN SODIUM 1000 UNITS: 1000 INJECTION, SOLUTION INTRAVENOUS; SUBCUTANEOUS at 01:05

## 2021-05-26 RX ADMIN — HEPARIN SODIUM 5000 UNITS: 5000 INJECTION INTRAVENOUS; SUBCUTANEOUS at 09:05

## 2021-05-26 RX ADMIN — ESCITALOPRAM OXALATE 20 MG: 20 TABLET ORAL at 09:05

## 2021-05-26 RX ADMIN — INSULIN ASPART 7 UNITS: 100 INJECTION, SOLUTION INTRAVENOUS; SUBCUTANEOUS at 07:05

## 2021-05-26 RX ADMIN — FERROUS SULFATE TAB EC 325 MG (65 MG FE EQUIVALENT) 325 MG: 325 (65 FE) TABLET DELAYED RESPONSE at 09:05

## 2021-05-26 RX ADMIN — SEVELAMER CARBONATE 1600 MG: 800 TABLET, FILM COATED ORAL at 07:05

## 2021-05-26 RX ADMIN — SEVELAMER CARBONATE 1600 MG: 800 TABLET, FILM COATED ORAL at 04:05

## 2021-05-26 RX ADMIN — INSULIN ASPART 7 UNITS: 100 INJECTION, SOLUTION INTRAVENOUS; SUBCUTANEOUS at 06:05

## 2021-05-26 RX ADMIN — NIFEDIPINE 60 MG: 60 TABLET, FILM COATED, EXTENDED RELEASE ORAL at 09:05

## 2021-05-26 RX ADMIN — HEPARIN SODIUM 5000 UNITS: 5000 INJECTION INTRAVENOUS; SUBCUTANEOUS at 06:05

## 2021-05-26 RX ADMIN — ASPIRIN 81 MG: 81 TABLET, COATED ORAL at 09:05

## 2021-05-27 ENCOUNTER — TELEPHONE (OUTPATIENT)
Dept: TRANSPLANT | Facility: CLINIC | Age: 26
End: 2021-05-27

## 2021-05-27 DIAGNOSIS — Z76.82 ORGAN TRANSPLANT CANDIDATE: Primary | ICD-10-CM

## 2021-05-27 LAB
BASOPHILS # BLD AUTO: 0.07 K/UL (ref 0–0.2)
BASOPHILS NFR BLD: 0.8 % (ref 0–1.9)
DIFFERENTIAL METHOD: ABNORMAL
EOSINOPHIL # BLD AUTO: 0.2 K/UL (ref 0–0.5)
EOSINOPHIL NFR BLD: 1.9 % (ref 0–8)
ERYTHROCYTE [DISTWIDTH] IN BLOOD BY AUTOMATED COUNT: 15.9 % (ref 11.5–14.5)
HCT VFR BLD AUTO: 27.8 % (ref 37–48.5)
HGB BLD-MCNC: 8.6 G/DL (ref 12–16)
IMM GRANULOCYTES # BLD AUTO: 0.07 K/UL (ref 0–0.04)
IMM GRANULOCYTES NFR BLD AUTO: 0.8 % (ref 0–0.5)
LYMPHOCYTES # BLD AUTO: 2.4 K/UL (ref 1–4.8)
LYMPHOCYTES NFR BLD: 26.6 % (ref 18–48)
MCH RBC QN AUTO: 29.2 PG (ref 27–31)
MCHC RBC AUTO-ENTMCNC: 30.9 G/DL (ref 32–36)
MCV RBC AUTO: 94 FL (ref 82–98)
MONOCYTES # BLD AUTO: 0.9 K/UL (ref 0.3–1)
MONOCYTES NFR BLD: 9.4 % (ref 4–15)
NEUTROPHILS # BLD AUTO: 5.6 K/UL (ref 1.8–7.7)
NEUTROPHILS NFR BLD: 60.5 % (ref 38–73)
NRBC BLD-RTO: 0 /100 WBC
PLATELET # BLD AUTO: 245 K/UL (ref 150–450)
PMV BLD AUTO: 11.2 FL (ref 9.2–12.9)
POCT GLUCOSE: 156 MG/DL (ref 70–110)
POCT GLUCOSE: 173 MG/DL (ref 70–110)
POCT GLUCOSE: 181 MG/DL (ref 70–110)
POCT GLUCOSE: 269 MG/DL (ref 70–110)
RBC # BLD AUTO: 2.95 M/UL (ref 4–5.4)
WBC # BLD AUTO: 9.18 K/UL (ref 3.9–12.7)

## 2021-05-27 PROCEDURE — 99231 PR SUBSEQUENT HOSPITAL CARE,LEVL I: ICD-10-PCS | Mod: NTX,,, | Performed by: HOSPITALIST

## 2021-05-27 PROCEDURE — 36415 COLL VENOUS BLD VENIPUNCTURE: CPT | Mod: NTX | Performed by: HOSPITALIST

## 2021-05-27 PROCEDURE — 85025 COMPLETE CBC W/AUTO DIFF WBC: CPT | Mod: NTX | Performed by: HOSPITALIST

## 2021-05-27 PROCEDURE — 20600001 HC STEP DOWN PRIVATE ROOM: Mod: NTX

## 2021-05-27 PROCEDURE — 25000003 PHARM REV CODE 250: Mod: NTX | Performed by: STUDENT IN AN ORGANIZED HEALTH CARE EDUCATION/TRAINING PROGRAM

## 2021-05-27 PROCEDURE — 99231 SBSQ HOSP IP/OBS SF/LOW 25: CPT | Mod: NTX,,, | Performed by: HOSPITALIST

## 2021-05-27 PROCEDURE — C9399 UNCLASSIFIED DRUGS OR BIOLOG: HCPCS | Mod: NTX | Performed by: STUDENT IN AN ORGANIZED HEALTH CARE EDUCATION/TRAINING PROGRAM

## 2021-05-27 PROCEDURE — 63600175 PHARM REV CODE 636 W HCPCS: Mod: NTX | Performed by: STUDENT IN AN ORGANIZED HEALTH CARE EDUCATION/TRAINING PROGRAM

## 2021-05-27 RX ORDER — HYDRALAZINE HYDROCHLORIDE 25 MG/1
25 TABLET, FILM COATED ORAL 2 TIMES DAILY
Status: DISCONTINUED | OUTPATIENT
Start: 2021-05-27 | End: 2021-05-28 | Stop reason: HOSPADM

## 2021-05-27 RX ADMIN — CARVEDILOL 25 MG: 25 TABLET, FILM COATED ORAL at 09:05

## 2021-05-27 RX ADMIN — CALCITRIOL CAPSULES 0.25 MCG 0.5 MCG: 0.25 CAPSULE ORAL at 08:05

## 2021-05-27 RX ADMIN — ESCITALOPRAM OXALATE 20 MG: 20 TABLET ORAL at 08:05

## 2021-05-27 RX ADMIN — FERROUS SULFATE TAB EC 325 MG (65 MG FE EQUIVALENT) 325 MG: 325 (65 FE) TABLET DELAYED RESPONSE at 09:05

## 2021-05-27 RX ADMIN — INSULIN DETEMIR 12 UNITS: 100 INJECTION, SOLUTION SUBCUTANEOUS at 09:05

## 2021-05-27 RX ADMIN — HYDRALAZINE HYDROCHLORIDE 25 MG: 25 TABLET, FILM COATED ORAL at 09:05

## 2021-05-27 RX ADMIN — INSULIN ASPART 7 UNITS: 100 INJECTION, SOLUTION INTRAVENOUS; SUBCUTANEOUS at 04:05

## 2021-05-27 RX ADMIN — NIFEDIPINE 60 MG: 60 TABLET, FILM COATED, EXTENDED RELEASE ORAL at 08:05

## 2021-05-27 RX ADMIN — HEPARIN SODIUM 5000 UNITS: 5000 INJECTION INTRAVENOUS; SUBCUTANEOUS at 05:05

## 2021-05-27 RX ADMIN — HEPARIN SODIUM 5000 UNITS: 5000 INJECTION INTRAVENOUS; SUBCUTANEOUS at 09:05

## 2021-05-27 RX ADMIN — HEPARIN SODIUM 5000 UNITS: 5000 INJECTION INTRAVENOUS; SUBCUTANEOUS at 04:05

## 2021-05-27 RX ADMIN — SEVELAMER CARBONATE 1600 MG: 800 TABLET, FILM COATED ORAL at 11:05

## 2021-05-27 RX ADMIN — ASPIRIN 81 MG: 81 TABLET, COATED ORAL at 09:05

## 2021-05-27 RX ADMIN — FERROUS SULFATE TAB EC 325 MG (65 MG FE EQUIVALENT) 325 MG: 325 (65 FE) TABLET DELAYED RESPONSE at 08:05

## 2021-05-27 RX ADMIN — INSULIN DETEMIR 10 UNITS: 100 INJECTION, SOLUTION SUBCUTANEOUS at 09:05

## 2021-05-27 RX ADMIN — SEVELAMER CARBONATE 1600 MG: 800 TABLET, FILM COATED ORAL at 07:05

## 2021-05-27 RX ADMIN — INSULIN ASPART 7 UNITS: 100 INJECTION, SOLUTION INTRAVENOUS; SUBCUTANEOUS at 09:05

## 2021-05-27 RX ADMIN — CARVEDILOL 25 MG: 25 TABLET, FILM COATED ORAL at 08:05

## 2021-05-27 RX ADMIN — INSULIN ASPART 7 UNITS: 100 INJECTION, SOLUTION INTRAVENOUS; SUBCUTANEOUS at 07:05

## 2021-05-27 RX ADMIN — NIFEDIPINE 60 MG: 60 TABLET, FILM COATED, EXTENDED RELEASE ORAL at 09:05

## 2021-05-28 VITALS
WEIGHT: 174.63 LBS | DIASTOLIC BLOOD PRESSURE: 57 MMHG | HEART RATE: 90 BPM | HEIGHT: 64 IN | TEMPERATURE: 98 F | SYSTOLIC BLOOD PRESSURE: 155 MMHG | OXYGEN SATURATION: 98 % | RESPIRATION RATE: 18 BRPM | BODY MASS INDEX: 29.81 KG/M2

## 2021-05-28 LAB
ALBUMIN SERPL BCP-MCNC: 1.7 G/DL (ref 3.5–5.2)
ALP SERPL-CCNC: 111 U/L (ref 55–135)
ALT SERPL W/O P-5'-P-CCNC: 34 U/L (ref 10–44)
ANION GAP SERPL CALC-SCNC: 12 MMOL/L (ref 8–16)
AST SERPL-CCNC: 59 U/L (ref 10–40)
BILIRUB SERPL-MCNC: 0.1 MG/DL (ref 0.1–1)
BUN SERPL-MCNC: 28 MG/DL (ref 6–20)
CALCIUM SERPL-MCNC: 8.3 MG/DL (ref 8.7–10.5)
CHLORIDE SERPL-SCNC: 105 MMOL/L (ref 95–110)
CO2 SERPL-SCNC: 24 MMOL/L (ref 23–29)
CREAT SERPL-MCNC: 5.1 MG/DL (ref 0.5–1.4)
EST. GFR  (AFRICAN AMERICAN): 12.5 ML/MIN/1.73 M^2
EST. GFR  (NON AFRICAN AMERICAN): 10.9 ML/MIN/1.73 M^2
GLUCOSE SERPL-MCNC: 191 MG/DL (ref 70–110)
MAGNESIUM SERPL-MCNC: 2.1 MG/DL (ref 1.6–2.6)
PHOSPHATE SERPL-MCNC: 6.4 MG/DL (ref 2.7–4.5)
POCT GLUCOSE: 140 MG/DL (ref 70–110)
POCT GLUCOSE: 197 MG/DL (ref 70–110)
POTASSIUM SERPL-SCNC: 4.5 MMOL/L (ref 3.5–5.1)
PROT SERPL-MCNC: 5.4 G/DL (ref 6–8.4)
SODIUM SERPL-SCNC: 141 MMOL/L (ref 136–145)

## 2021-05-28 PROCEDURE — 99238 PR HOSPITAL DISCHARGE DAY,<30 MIN: ICD-10-PCS | Mod: NTX,,, | Performed by: HOSPITALIST

## 2021-05-28 PROCEDURE — 90935 HEMODIALYSIS ONE EVALUATION: CPT | Mod: NTX

## 2021-05-28 PROCEDURE — 63600175 PHARM REV CODE 636 W HCPCS: Mod: NTX | Performed by: GENERAL PRACTICE

## 2021-05-28 PROCEDURE — 25000003 PHARM REV CODE 250: Mod: NTX | Performed by: STUDENT IN AN ORGANIZED HEALTH CARE EDUCATION/TRAINING PROGRAM

## 2021-05-28 PROCEDURE — 84100 ASSAY OF PHOSPHORUS: CPT | Mod: NTX | Performed by: STUDENT IN AN ORGANIZED HEALTH CARE EDUCATION/TRAINING PROGRAM

## 2021-05-28 PROCEDURE — 99238 HOSP IP/OBS DSCHRG MGMT 30/<: CPT | Mod: NTX,,, | Performed by: HOSPITALIST

## 2021-05-28 PROCEDURE — 83735 ASSAY OF MAGNESIUM: CPT | Mod: NTX | Performed by: STUDENT IN AN ORGANIZED HEALTH CARE EDUCATION/TRAINING PROGRAM

## 2021-05-28 PROCEDURE — 90935 HEMODIALYSIS ONE EVALUATION: CPT | Mod: NTX,,, | Performed by: INTERNAL MEDICINE

## 2021-05-28 PROCEDURE — 25000003 PHARM REV CODE 250: Mod: NTX | Performed by: HOSPITALIST

## 2021-05-28 PROCEDURE — 63600175 PHARM REV CODE 636 W HCPCS: Mod: NTX | Performed by: STUDENT IN AN ORGANIZED HEALTH CARE EDUCATION/TRAINING PROGRAM

## 2021-05-28 PROCEDURE — C9399 UNCLASSIFIED DRUGS OR BIOLOG: HCPCS | Mod: NTX | Performed by: STUDENT IN AN ORGANIZED HEALTH CARE EDUCATION/TRAINING PROGRAM

## 2021-05-28 PROCEDURE — 90935 PR HEMODIALYSIS, ONE EVALUATION: ICD-10-PCS | Mod: NTX,,, | Performed by: INTERNAL MEDICINE

## 2021-05-28 PROCEDURE — 36415 COLL VENOUS BLD VENIPUNCTURE: CPT | Mod: NTX | Performed by: STUDENT IN AN ORGANIZED HEALTH CARE EDUCATION/TRAINING PROGRAM

## 2021-05-28 PROCEDURE — 80053 COMPREHEN METABOLIC PANEL: CPT | Mod: NTX | Performed by: STUDENT IN AN ORGANIZED HEALTH CARE EDUCATION/TRAINING PROGRAM

## 2021-05-28 RX ORDER — SEVELAMER CARBONATE 800 MG/1
1600 TABLET, FILM COATED ORAL
Qty: 180 TABLET | Refills: 1
Start: 2021-05-28 | End: 2022-01-24

## 2021-05-28 RX ORDER — SODIUM CHLORIDE 9 MG/ML
INJECTION, SOLUTION INTRAVENOUS
Status: DISCONTINUED | OUTPATIENT
Start: 2021-05-28 | End: 2021-05-28 | Stop reason: HOSPADM

## 2021-05-28 RX ORDER — SODIUM CHLORIDE 9 MG/ML
INJECTION, SOLUTION INTRAVENOUS ONCE
Status: COMPLETED | OUTPATIENT
Start: 2021-05-28 | End: 2021-05-28

## 2021-05-28 RX ORDER — HYDRALAZINE HYDROCHLORIDE 25 MG/1
25 TABLET, FILM COATED ORAL 2 TIMES DAILY
Qty: 60 TABLET | Refills: 3 | Status: CANCELLED | OUTPATIENT
Start: 2021-05-28 | End: 2022-05-28

## 2021-05-28 RX ORDER — HYDRALAZINE HYDROCHLORIDE 25 MG/1
25 TABLET, FILM COATED ORAL 2 TIMES DAILY
Qty: 60 TABLET | Refills: 3 | Status: SHIPPED | OUTPATIENT
Start: 2021-05-28 | End: 2021-10-29

## 2021-05-28 RX ADMIN — INSULIN ASPART 7 UNITS: 100 INJECTION, SOLUTION INTRAVENOUS; SUBCUTANEOUS at 01:05

## 2021-05-28 RX ADMIN — SODIUM CHLORIDE 100 ML/HR: 0.9 INJECTION, SOLUTION INTRAVENOUS at 08:05

## 2021-05-28 RX ADMIN — SEVELAMER CARBONATE 1600 MG: 800 TABLET, FILM COATED ORAL at 10:05

## 2021-05-28 RX ADMIN — HEPARIN SODIUM 1000 UNITS: 1000 INJECTION, SOLUTION INTRAVENOUS; SUBCUTANEOUS at 11:05

## 2021-05-28 RX ADMIN — SEVELAMER CARBONATE 1600 MG: 800 TABLET, FILM COATED ORAL at 12:05

## 2021-05-28 RX ADMIN — FERROUS SULFATE TAB EC 325 MG (65 MG FE EQUIVALENT) 325 MG: 325 (65 FE) TABLET DELAYED RESPONSE at 12:05

## 2021-05-28 RX ADMIN — HYDRALAZINE HYDROCHLORIDE 25 MG: 25 TABLET, FILM COATED ORAL at 12:05

## 2021-05-28 RX ADMIN — INSULIN DETEMIR 10 UNITS: 100 INJECTION, SOLUTION SUBCUTANEOUS at 09:05

## 2021-05-28 RX ADMIN — CALCITRIOL CAPSULES 0.25 MCG 0.5 MCG: 0.25 CAPSULE ORAL at 12:05

## 2021-05-28 RX ADMIN — INSULIN ASPART 7 UNITS: 100 INJECTION, SOLUTION INTRAVENOUS; SUBCUTANEOUS at 09:05

## 2021-05-28 RX ADMIN — ESCITALOPRAM OXALATE 20 MG: 20 TABLET ORAL at 12:05

## 2021-05-28 RX ADMIN — HEPARIN SODIUM 5000 UNITS: 5000 INJECTION INTRAVENOUS; SUBCUTANEOUS at 05:05

## 2021-05-28 RX ADMIN — NIFEDIPINE 60 MG: 60 TABLET, FILM COATED, EXTENDED RELEASE ORAL at 12:05

## 2021-05-28 RX ADMIN — CARVEDILOL 25 MG: 25 TABLET, FILM COATED ORAL at 12:05

## 2021-05-31 ENCOUNTER — TELEPHONE (OUTPATIENT)
Dept: TRANSPLANT | Facility: CLINIC | Age: 26
End: 2021-05-31

## 2021-05-31 ENCOUNTER — PATIENT OUTREACH (OUTPATIENT)
Dept: ADMINISTRATIVE | Facility: CLINIC | Age: 26
End: 2021-05-31

## 2021-06-01 ENCOUNTER — TELEPHONE (OUTPATIENT)
Dept: INTERNAL MEDICINE | Facility: CLINIC | Age: 26
End: 2021-06-01

## 2021-06-01 ENCOUNTER — IMMUNIZATION (OUTPATIENT)
Dept: PHARMACY | Facility: CLINIC | Age: 26
End: 2021-06-01
Payer: MEDICAID

## 2021-06-01 DIAGNOSIS — Z23 NEED FOR VACCINATION: Primary | ICD-10-CM

## 2021-06-03 ENCOUNTER — TELEPHONE (OUTPATIENT)
Dept: CARDIOLOGY | Facility: CLINIC | Age: 26
End: 2021-06-03

## 2021-06-07 ENCOUNTER — HOSPITAL ENCOUNTER (OUTPATIENT)
Dept: CARDIOLOGY | Facility: HOSPITAL | Age: 26
Discharge: HOME | End: 2021-06-07
Attending: NURSE PRACTITIONER
Payer: MEDICAID

## 2021-06-07 DIAGNOSIS — Z76.82 ORGAN TRANSPLANT CANDIDATE: ICD-10-CM

## 2021-06-07 LAB
CV PHARM DOSE: 0.4 MG
CV STRESS BASE HR: 84 BPM
DIASTOLIC BLOOD PRESSURE: 113 MMHG
END DIASTOLIC INDEX-HIGH: 170 ML/M2
END SYSTOLIC INDEX-HIGH: 70 ML/M2
NUC REST DIASTOLIC VOLUME INDEX: 101
NUC REST EJECTION FRACTION: 65
NUC REST SYSTOLIC VOLUME INDEX: 35
NUC STRESS DIASTOLIC VOLUME INDEX: 114
NUC STRESS EJECTION FRACTION: 64 %
NUC STRESS SYSTOLIC VOLUME INDEX: 41
OHS CV CPX 85 PERCENT MAX PREDICTED HEART RATE MALE: 156
OHS CV CPX MAX PREDICTED HEART RATE: 183
OHS CV CPX PATIENT IS FEMALE: 1
OHS CV CPX PATIENT IS MALE: 0
OHS CV CPX PEAK DIASTOLIC BLOOD PRESSURE: 113 MMHG
OHS CV CPX PEAK HEAR RATE: 86 BPM
OHS CV CPX PEAK RATE PRESSURE PRODUCT: NORMAL
OHS CV CPX PEAK SYSTOLIC BLOOD PRESSURE: 184 MMHG
OHS CV CPX PERCENT MAX PREDICTED HEART RATE ACHIEVED: 47
OHS CV CPX RATE PRESSURE PRODUCT PRESENTING: NORMAL
RETIRED EF AND QEF - SEE NOTES: 51 %
SYSTOLIC BLOOD PRESSURE: 184 MMHG

## 2021-06-07 PROCEDURE — 93016 STRESS TEST WITH MYOCARDIAL PERFUSION (CUPID ONLY): ICD-10-PCS | Mod: TXP,,, | Performed by: INTERNAL MEDICINE

## 2021-06-07 PROCEDURE — 93018 STRESS TEST WITH MYOCARDIAL PERFUSION (CUPID ONLY): ICD-10-PCS | Mod: TXP,,, | Performed by: INTERNAL MEDICINE

## 2021-06-07 PROCEDURE — A9502 TC99M TETROFOSMIN: HCPCS | Mod: TXP

## 2021-06-07 PROCEDURE — 78452 HT MUSCLE IMAGE SPECT MULT: CPT | Mod: 26,TXP,, | Performed by: INTERNAL MEDICINE

## 2021-06-07 PROCEDURE — 93017 CV STRESS TEST TRACING ONLY: CPT | Mod: TXP

## 2021-06-07 PROCEDURE — 93016 CV STRESS TEST SUPVJ ONLY: CPT | Mod: TXP,,, | Performed by: INTERNAL MEDICINE

## 2021-06-07 PROCEDURE — 63600175 PHARM REV CODE 636 W HCPCS: Mod: TXP | Performed by: NURSE PRACTITIONER

## 2021-06-07 PROCEDURE — 93018 CV STRESS TEST I&R ONLY: CPT | Mod: TXP,,, | Performed by: INTERNAL MEDICINE

## 2021-06-07 PROCEDURE — 78452 STRESS TEST WITH MYOCARDIAL PERFUSION (CUPID ONLY): ICD-10-PCS | Mod: 26,TXP,, | Performed by: INTERNAL MEDICINE

## 2021-06-07 RX ORDER — REGADENOSON 0.08 MG/ML
0.4 INJECTION, SOLUTION INTRAVENOUS ONCE
Status: COMPLETED | OUTPATIENT
Start: 2021-06-07 | End: 2021-06-07

## 2021-06-07 RX ADMIN — REGADENOSON 0.4 MG: 0.08 INJECTION, SOLUTION INTRAVENOUS at 10:06

## 2021-06-08 ENCOUNTER — TELEPHONE (OUTPATIENT)
Dept: OPTOMETRY | Facility: CLINIC | Age: 26
End: 2021-06-08

## 2021-06-09 ENCOUNTER — LAB VISIT (OUTPATIENT)
Dept: SPORTS MEDICINE | Facility: CLINIC | Age: 26
End: 2021-06-09
Payer: MEDICAID

## 2021-06-09 DIAGNOSIS — Z13.9 SCREENING PROCEDURE: ICD-10-CM

## 2021-06-09 PROCEDURE — U0005 INFEC AGEN DETEC AMPLI PROBE: HCPCS | Mod: TXP | Performed by: NURSE PRACTITIONER

## 2021-06-09 PROCEDURE — U0003 INFECTIOUS AGENT DETECTION BY NUCLEIC ACID (DNA OR RNA); SEVERE ACUTE RESPIRATORY SYNDROME CORONAVIRUS 2 (SARS-COV-2) (CORONAVIRUS DISEASE [COVID-19]), AMPLIFIED PROBE TECHNIQUE, MAKING USE OF HIGH THROUGHPUT TECHNOLOGIES AS DESCRIBED BY CMS-2020-01-R: HCPCS | Mod: TXP | Performed by: NURSE PRACTITIONER

## 2021-06-10 ENCOUNTER — HOSPITAL ENCOUNTER (OUTPATIENT)
Dept: PULMONOLOGY | Facility: CLINIC | Age: 26
Discharge: HOME | End: 2021-06-10
Payer: MEDICAID

## 2021-06-10 ENCOUNTER — HOSPITAL ENCOUNTER (OUTPATIENT)
Dept: PULMONOLOGY | Facility: CLINIC | Age: 26
Discharge: HOME | End: 2021-06-10
Attending: NURSE PRACTITIONER
Payer: MEDICAID

## 2021-06-10 VITALS — BODY MASS INDEX: 27.97 KG/M2 | WEIGHT: 174 LBS | HEIGHT: 66 IN

## 2021-06-10 DIAGNOSIS — Z76.82 ORGAN TRANSPLANT CANDIDATE: ICD-10-CM

## 2021-06-10 DIAGNOSIS — R06.02 SOB (SHORTNESS OF BREATH): ICD-10-CM

## 2021-06-10 DIAGNOSIS — R06.09 DYSPNEA ON EXERTION: Primary | ICD-10-CM

## 2021-06-10 LAB — SARS-COV-2 RNA RESP QL NAA+PROBE: NOT DETECTED

## 2021-06-14 ENCOUNTER — TELEPHONE (OUTPATIENT)
Dept: CARDIOLOGY | Facility: CLINIC | Age: 26
End: 2021-06-14

## 2021-06-14 DIAGNOSIS — R00.2 PALPITATIONS: Primary | ICD-10-CM

## 2021-06-15 ENCOUNTER — HOSPITAL ENCOUNTER (OUTPATIENT)
Dept: PULMONOLOGY | Facility: CLINIC | Age: 26
Discharge: HOME | End: 2021-06-15
Attending: NURSE PRACTITIONER
Payer: MEDICAID

## 2021-06-15 ENCOUNTER — HOSPITAL ENCOUNTER (OUTPATIENT)
Dept: PULMONOLOGY | Facility: CLINIC | Age: 26
Discharge: HOME | End: 2021-06-15
Payer: MEDICAID

## 2021-06-15 ENCOUNTER — OFFICE VISIT (OUTPATIENT)
Dept: CARDIOLOGY | Facility: CLINIC | Age: 26
End: 2021-06-15
Payer: MEDICAID

## 2021-06-15 ENCOUNTER — OFFICE VISIT (OUTPATIENT)
Dept: ENDOCRINOLOGY | Facility: CLINIC | Age: 26
End: 2021-06-15
Payer: MEDICAID

## 2021-06-15 VITALS
HEIGHT: 66 IN | BODY MASS INDEX: 26.58 KG/M2 | DIASTOLIC BLOOD PRESSURE: 10 MMHG | HEIGHT: 64 IN | SYSTOLIC BLOOD PRESSURE: 199 MMHG | HEART RATE: 91 BPM | BODY MASS INDEX: 28.19 KG/M2 | WEIGHT: 165.13 LBS | WEIGHT: 165.38 LBS

## 2021-06-15 VITALS
SYSTOLIC BLOOD PRESSURE: 141 MMHG | OXYGEN SATURATION: 98 % | WEIGHT: 165.25 LBS | DIASTOLIC BLOOD PRESSURE: 87 MMHG | HEIGHT: 66 IN | HEART RATE: 92 BPM | BODY MASS INDEX: 26.56 KG/M2

## 2021-06-15 DIAGNOSIS — Z76.82 ORGAN TRANSPLANT CANDIDATE: ICD-10-CM

## 2021-06-15 DIAGNOSIS — I10 HYPERTENSION, ESSENTIAL: ICD-10-CM

## 2021-06-15 DIAGNOSIS — E10.22 TYPE 1 DIABETES MELLITUS WITH STAGE 5 CHRONIC KIDNEY DISEASE NOT ON CHRONIC DIALYSIS: ICD-10-CM

## 2021-06-15 DIAGNOSIS — N18.5 TYPE 1 DIABETES MELLITUS WITH STAGE 5 CHRONIC KIDNEY DISEASE NOT ON CHRONIC DIALYSIS: ICD-10-CM

## 2021-06-15 DIAGNOSIS — E78.2 MIXED HYPERLIPIDEMIA: ICD-10-CM

## 2021-06-15 DIAGNOSIS — I15.0 RENOVASCULAR HYPERTENSION: ICD-10-CM

## 2021-06-15 DIAGNOSIS — Z01.818 PREOPERATIVE CLEARANCE: ICD-10-CM

## 2021-06-15 DIAGNOSIS — I10 HYPERTENSION, ESSENTIAL: Primary | ICD-10-CM

## 2021-06-15 DIAGNOSIS — N17.9 ACUTE KIDNEY INJURY SUPERIMPOSED ON CHRONIC KIDNEY DISEASE: ICD-10-CM

## 2021-06-15 DIAGNOSIS — E10.65 UNCONTROLLED TYPE 1 DIABETES MELLITUS WITH HYPERGLYCEMIA: ICD-10-CM

## 2021-06-15 DIAGNOSIS — Z76.82 ORGAN TRANSPLANT CANDIDATE: Primary | ICD-10-CM

## 2021-06-15 DIAGNOSIS — N18.9 ACUTE KIDNEY INJURY SUPERIMPOSED ON CHRONIC KIDNEY DISEASE: ICD-10-CM

## 2021-06-15 LAB
ALLENS TEST: ABNORMAL
DELSYS: ABNORMAL
FIO2: 0.21
HCO3 UR-SCNC: 32.6 MMOL/L (ref 24–28)
MODE: ABNORMAL
PCO2 BLDA: 47 MMHG (ref 35–45)
PH SMN: 7.45 [PH] (ref 7.35–7.45)
PO2 BLDA: 103 MMHG (ref 80–100)
POC BE: 9 MMOL/L
POC SATURATED O2: 98 % (ref 95–100)
POC TCO2: 34 MMOL/L (ref 23–27)
SAMPLE: ABNORMAL
SITE: ABNORMAL

## 2021-06-15 PROCEDURE — 94727 GAS DIL/WSHOT DETER LNG VOL: CPT | Mod: PBBFAC,TXP | Performed by: INTERNAL MEDICINE

## 2021-06-15 PROCEDURE — 94010 BREATHING CAPACITY TEST: ICD-10-PCS | Mod: 26,S$PBB,TXP, | Performed by: INTERNAL MEDICINE

## 2021-06-15 PROCEDURE — 94010 BREATHING CAPACITY TEST: CPT | Mod: 26,S$PBB,TXP, | Performed by: INTERNAL MEDICINE

## 2021-06-15 PROCEDURE — 94727 GAS DIL/WSHOT DETER LNG VOL: CPT | Mod: 26,S$PBB,TXP, | Performed by: INTERNAL MEDICINE

## 2021-06-15 PROCEDURE — 99999 PR PBB SHADOW E&M-EST. PATIENT-LVL IV: ICD-10-PCS | Mod: PBBFAC,TXP,, | Performed by: INTERNAL MEDICINE

## 2021-06-15 PROCEDURE — 99999 PR PBB SHADOW E&M-EST. PATIENT-LVL IV: CPT | Mod: PBBFAC,TXP,, | Performed by: INTERNAL MEDICINE

## 2021-06-15 PROCEDURE — 99204 PR OFFICE/OUTPT VISIT, NEW, LEVL IV, 45-59 MIN: ICD-10-PCS | Mod: S$PBB,NTX,, | Performed by: INTERNAL MEDICINE

## 2021-06-15 PROCEDURE — 99204 OFFICE O/P NEW MOD 45 MIN: CPT | Mod: S$PBB,NTX,, | Performed by: INTERNAL MEDICINE

## 2021-06-15 PROCEDURE — 36600 PR WITHDRAWAL OF ARTERIAL BLOOD: ICD-10-PCS | Mod: S$PBB,TXP,, | Performed by: INTERNAL MEDICINE

## 2021-06-15 PROCEDURE — 99214 OFFICE O/P EST MOD 30 MIN: CPT | Mod: PBBFAC,25,27,NTX | Performed by: INTERNAL MEDICINE

## 2021-06-15 PROCEDURE — 94729 PR C02/MEMBANE DIFFUSE CAPACITY: ICD-10-PCS | Mod: 26,S$PBB,TXP, | Performed by: INTERNAL MEDICINE

## 2021-06-15 PROCEDURE — 94010 BREATHING CAPACITY TEST: CPT | Mod: PBBFAC,TXP | Performed by: INTERNAL MEDICINE

## 2021-06-15 PROCEDURE — 94729 DIFFUSING CAPACITY: CPT | Mod: PBBFAC,TXP | Performed by: INTERNAL MEDICINE

## 2021-06-15 PROCEDURE — 94618 PULMONARY STRESS TESTING: ICD-10-PCS | Mod: 26,S$PBB,TXP, | Performed by: INTERNAL MEDICINE

## 2021-06-15 PROCEDURE — 94727 PR PULM FUNCTION TEST BY GAS: ICD-10-PCS | Mod: 26,S$PBB,TXP, | Performed by: INTERNAL MEDICINE

## 2021-06-15 PROCEDURE — 94618 PULMONARY STRESS TESTING: CPT | Mod: PBBFAC,TXP | Performed by: INTERNAL MEDICINE

## 2021-06-15 PROCEDURE — 82803 BLOOD GASES ANY COMBINATION: CPT | Mod: PBBFAC,TXP | Performed by: INTERNAL MEDICINE

## 2021-06-15 PROCEDURE — 36600 WITHDRAWAL OF ARTERIAL BLOOD: CPT | Mod: PBBFAC,TXP | Performed by: INTERNAL MEDICINE

## 2021-06-15 PROCEDURE — 94618 PULMONARY STRESS TESTING: CPT | Mod: 26,S$PBB,TXP, | Performed by: INTERNAL MEDICINE

## 2021-06-15 PROCEDURE — 99213 PR OFFICE/OUTPT VISIT, EST, LEVL III, 20-29 MIN: ICD-10-PCS | Mod: S$PBB,NTX,, | Performed by: INTERNAL MEDICINE

## 2021-06-15 PROCEDURE — 94729 DIFFUSING CAPACITY: CPT | Mod: 26,S$PBB,TXP, | Performed by: INTERNAL MEDICINE

## 2021-06-15 PROCEDURE — 99214 OFFICE O/P EST MOD 30 MIN: CPT | Mod: PBBFAC,TXP,25 | Performed by: INTERNAL MEDICINE

## 2021-06-15 PROCEDURE — 36600 WITHDRAWAL OF ARTERIAL BLOOD: CPT | Mod: S$PBB,TXP,, | Performed by: INTERNAL MEDICINE

## 2021-06-15 PROCEDURE — 99213 OFFICE O/P EST LOW 20 MIN: CPT | Mod: S$PBB,NTX,, | Performed by: INTERNAL MEDICINE

## 2021-06-15 RX ORDER — ROSUVASTATIN CALCIUM 10 MG/1
10 TABLET, COATED ORAL NIGHTLY
Qty: 90 TABLET | Refills: 3 | Status: SHIPPED | OUTPATIENT
Start: 2021-06-15 | End: 2021-08-03 | Stop reason: SDUPTHER

## 2021-06-16 NOTE — DISCHARGE INSTRUCTIONS
Call clinic [phone] or L & D after hours at [phone] for vaginal bleeding, headache not relieved by Tylenol, blurry vision, or temp of 100.4 or greater.  Begin doing fetal kick counts, at least 10 movements in 2 hours starting at 28 weeks gestation.  Keep next clinic appointment     No

## 2021-06-19 DIAGNOSIS — N18.6 ESRD (END STAGE RENAL DISEASE): ICD-10-CM

## 2021-06-19 DIAGNOSIS — R18.8 OTHER ASCITES: Primary | ICD-10-CM

## 2021-06-24 ENCOUNTER — OFFICE VISIT (OUTPATIENT)
Dept: HEMATOLOGY/ONCOLOGY | Facility: CLINIC | Age: 26
End: 2021-06-24
Payer: MEDICAID

## 2021-06-24 ENCOUNTER — HOSPITAL ENCOUNTER (OUTPATIENT)
Dept: INTERVENTIONAL RADIOLOGY/VASCULAR | Facility: HOSPITAL | Age: 26
Discharge: HOME | End: 2021-06-24
Attending: NURSE PRACTITIONER
Payer: MEDICAID

## 2021-06-24 VITALS
SYSTOLIC BLOOD PRESSURE: 140 MMHG | HEART RATE: 100 BPM | BODY MASS INDEX: 26.57 KG/M2 | RESPIRATION RATE: 18 BRPM | DIASTOLIC BLOOD PRESSURE: 75 MMHG | OXYGEN SATURATION: 98 % | TEMPERATURE: 98 F | HEIGHT: 64 IN | WEIGHT: 155.63 LBS

## 2021-06-24 DIAGNOSIS — R60.1 ANASARCA: ICD-10-CM

## 2021-06-24 DIAGNOSIS — I10 HYPERTENSION, ESSENTIAL: ICD-10-CM

## 2021-06-24 DIAGNOSIS — N18.6 ESRD (END STAGE RENAL DISEASE): ICD-10-CM

## 2021-06-24 DIAGNOSIS — E83.19 IRON OVERLOAD: ICD-10-CM

## 2021-06-24 DIAGNOSIS — N18.6 ANEMIA DUE TO CHRONIC KIDNEY DISEASE, ON CHRONIC DIALYSIS: ICD-10-CM

## 2021-06-24 DIAGNOSIS — Z99.2 ANEMIA DUE TO CHRONIC KIDNEY DISEASE, ON CHRONIC DIALYSIS: ICD-10-CM

## 2021-06-24 DIAGNOSIS — O99.011 ANEMIA DURING PREGNANCY IN FIRST TRIMESTER: ICD-10-CM

## 2021-06-24 DIAGNOSIS — D63.1 ANEMIA DUE TO CHRONIC KIDNEY DISEASE, ON CHRONIC DIALYSIS: ICD-10-CM

## 2021-06-24 DIAGNOSIS — R18.8 OTHER ASCITES: ICD-10-CM

## 2021-06-24 DIAGNOSIS — Z76.82 ORGAN TRANSPLANT CANDIDATE: ICD-10-CM

## 2021-06-24 DIAGNOSIS — R60.0 BILATERAL LOWER EXTREMITY EDEMA: Primary | ICD-10-CM

## 2021-06-24 PROCEDURE — 99205 OFFICE O/P NEW HI 60 MIN: CPT | Mod: S$PBB,TXP,, | Performed by: INTERNAL MEDICINE

## 2021-06-24 PROCEDURE — 99205 PR OFFICE/OUTPT VISIT, NEW, LEVL V, 60-74 MIN: ICD-10-PCS | Mod: S$PBB,TXP,, | Performed by: INTERNAL MEDICINE

## 2021-06-24 PROCEDURE — 76705 IR US ABDOMEN LIMITED: ICD-10-PCS | Mod: 26,TXP,, | Performed by: PHYSICIAN ASSISTANT

## 2021-06-24 PROCEDURE — 76705 ECHO EXAM OF ABDOMEN: CPT | Mod: TC,TXP | Performed by: RADIOLOGY

## 2021-06-24 PROCEDURE — 99999 PR PBB SHADOW E&M-EST. PATIENT-LVL V: ICD-10-PCS | Mod: PBBFAC,TXP,, | Performed by: INTERNAL MEDICINE

## 2021-06-24 PROCEDURE — 99999 PR PBB SHADOW E&M-EST. PATIENT-LVL V: CPT | Mod: PBBFAC,TXP,, | Performed by: INTERNAL MEDICINE

## 2021-06-24 PROCEDURE — 76705 ECHO EXAM OF ABDOMEN: CPT | Mod: 26,TXP,, | Performed by: PHYSICIAN ASSISTANT

## 2021-06-24 PROCEDURE — 99215 OFFICE O/P EST HI 40 MIN: CPT | Mod: PBBFAC,TXP,25 | Performed by: INTERNAL MEDICINE

## 2021-06-29 ENCOUNTER — IMMUNIZATION (OUTPATIENT)
Dept: PHARMACY | Facility: CLINIC | Age: 26
End: 2021-06-29
Payer: MEDICAID

## 2021-06-29 DIAGNOSIS — Z23 NEED FOR VACCINATION: Primary | ICD-10-CM

## 2021-07-06 ENCOUNTER — TELEPHONE (OUTPATIENT)
Dept: TRANSPLANT | Facility: CLINIC | Age: 26
End: 2021-07-06

## 2021-07-13 ENCOUNTER — TELEPHONE (OUTPATIENT)
Dept: TRANSPLANT | Facility: CLINIC | Age: 26
End: 2021-07-13

## 2021-07-13 DIAGNOSIS — N18.6 END STAGE RENAL FAILURE ON DIALYSIS: Primary | ICD-10-CM

## 2021-07-13 DIAGNOSIS — Z99.2 END STAGE RENAL FAILURE ON DIALYSIS: Primary | ICD-10-CM

## 2021-07-15 ENCOUNTER — PATIENT OUTREACH (OUTPATIENT)
Dept: ADMINISTRATIVE | Facility: OTHER | Age: 26
End: 2021-07-15

## 2021-07-19 ENCOUNTER — TELEPHONE (OUTPATIENT)
Dept: OPTOMETRY | Facility: CLINIC | Age: 26
End: 2021-07-19

## 2021-07-19 ENCOUNTER — OFFICE VISIT (OUTPATIENT)
Dept: OPTOMETRY | Facility: CLINIC | Age: 26
End: 2021-07-19
Payer: MEDICAID

## 2021-07-19 DIAGNOSIS — H52.13 MYOPIA WITH ASTIGMATISM, BILATERAL: ICD-10-CM

## 2021-07-19 DIAGNOSIS — H52.203 MYOPIA WITH ASTIGMATISM, BILATERAL: ICD-10-CM

## 2021-07-19 DIAGNOSIS — E10.3511 TYPE 1 DIABETES MELLITUS WITH PROLIFERATIVE DIABETIC RETINOPATHY WITH MACULAR EDEMA, RIGHT EYE: Primary | ICD-10-CM

## 2021-07-19 DIAGNOSIS — E10.3292 TYPE 1 DIABETES MELLITUS WITH MILD NONPROLIFERATIVE RETINOPATHY OF LEFT EYE WITHOUT MACULAR EDEMA: ICD-10-CM

## 2021-07-19 PROCEDURE — 92004 PR EYE EXAM, NEW PATIENT,COMPREHESV: ICD-10-PCS | Mod: S$PBB,TXP,, | Performed by: OPTOMETRIST

## 2021-07-19 PROCEDURE — 92015 PR REFRACTION: ICD-10-PCS | Mod: TXP,,, | Performed by: OPTOMETRIST

## 2021-07-19 PROCEDURE — 99999 PR PBB SHADOW E&M-EST. PATIENT-LVL III: ICD-10-PCS | Mod: PBBFAC,TXP,, | Performed by: OPTOMETRIST

## 2021-07-19 PROCEDURE — 99213 OFFICE O/P EST LOW 20 MIN: CPT | Mod: PBBFAC,TXP | Performed by: OPTOMETRIST

## 2021-07-19 PROCEDURE — 92015 DETERMINE REFRACTIVE STATE: CPT | Mod: TXP,,, | Performed by: OPTOMETRIST

## 2021-07-19 PROCEDURE — 92004 COMPRE OPH EXAM NEW PT 1/>: CPT | Mod: S$PBB,TXP,, | Performed by: OPTOMETRIST

## 2021-07-19 PROCEDURE — 99999 PR PBB SHADOW E&M-EST. PATIENT-LVL III: CPT | Mod: PBBFAC,TXP,, | Performed by: OPTOMETRIST

## 2021-07-21 ENCOUNTER — TELEPHONE (OUTPATIENT)
Dept: VASCULAR SURGERY | Facility: CLINIC | Age: 26
End: 2021-07-21

## 2021-07-26 ENCOUNTER — PATIENT MESSAGE (OUTPATIENT)
Dept: RESEARCH | Facility: HOSPITAL | Age: 26
End: 2021-07-26

## 2021-07-30 ENCOUNTER — TELEPHONE (OUTPATIENT)
Dept: HEMATOLOGY/ONCOLOGY | Facility: CLINIC | Age: 26
End: 2021-07-30

## 2021-07-30 ENCOUNTER — LAB VISIT (OUTPATIENT)
Dept: LAB | Facility: HOSPITAL | Age: 26
End: 2021-07-30
Payer: MEDICARE

## 2021-07-30 ENCOUNTER — TELEPHONE (OUTPATIENT)
Dept: TRANSPLANT | Facility: CLINIC | Age: 26
End: 2021-07-30

## 2021-07-30 DIAGNOSIS — Z76.82 ORGAN TRANSPLANT CANDIDATE: ICD-10-CM

## 2021-07-30 LAB
ALBUMIN SERPL BCP-MCNC: 2.8 G/DL (ref 3.5–5.2)
ALP SERPL-CCNC: 86 U/L (ref 55–135)
ALT SERPL W/O P-5'-P-CCNC: 9 U/L (ref 10–44)
AST SERPL-CCNC: 14 U/L (ref 10–40)
BILIRUB DIRECT SERPL-MCNC: 0.2 MG/DL (ref 0.1–0.3)
BILIRUB SERPL-MCNC: 0.4 MG/DL (ref 0.1–1)
CHOLEST SERPL-MCNC: 204 MG/DL (ref 120–199)
CHOLEST/HDLC SERPL: 2.7 {RATIO} (ref 2–5)
HDLC SERPL-MCNC: 76 MG/DL (ref 40–75)
HDLC SERPL: 37.3 % (ref 20–50)
LDLC SERPL CALC-MCNC: 111 MG/DL (ref 63–159)
NONHDLC SERPL-MCNC: 128 MG/DL
PROT SERPL-MCNC: 7 G/DL (ref 6–8.4)
TRIGL SERPL-MCNC: 85 MG/DL (ref 30–150)

## 2021-07-30 PROCEDURE — 36415 COLL VENOUS BLD VENIPUNCTURE: CPT | Mod: TXP | Performed by: INTERNAL MEDICINE

## 2021-07-30 PROCEDURE — 80076 HEPATIC FUNCTION PANEL: CPT | Mod: TXP | Performed by: INTERNAL MEDICINE

## 2021-07-30 PROCEDURE — 80061 LIPID PANEL: CPT | Mod: TXP | Performed by: INTERNAL MEDICINE

## 2021-08-03 ENCOUNTER — PATIENT MESSAGE (OUTPATIENT)
Dept: ADMINISTRATIVE | Facility: HOSPITAL | Age: 26
End: 2021-08-03

## 2021-08-05 ENCOUNTER — OFFICE VISIT (OUTPATIENT)
Dept: VASCULAR SURGERY | Facility: CLINIC | Age: 26
End: 2021-08-05
Attending: SURGERY
Payer: MEDICARE

## 2021-08-05 ENCOUNTER — HOSPITAL ENCOUNTER (OUTPATIENT)
Dept: VASCULAR SURGERY | Facility: CLINIC | Age: 26
Discharge: HOME | End: 2021-08-05
Attending: SURGERY
Payer: MEDICARE

## 2021-08-05 VITALS
HEART RATE: 96 BPM | HEIGHT: 64 IN | TEMPERATURE: 99 F | WEIGHT: 146.81 LBS | DIASTOLIC BLOOD PRESSURE: 58 MMHG | BODY MASS INDEX: 25.06 KG/M2 | SYSTOLIC BLOOD PRESSURE: 111 MMHG

## 2021-08-05 DIAGNOSIS — T82.898A ARTERIOVENOUS FISTULA OCCLUSION, INITIAL ENCOUNTER: Primary | ICD-10-CM

## 2021-08-05 DIAGNOSIS — Z99.2 END STAGE RENAL FAILURE ON DIALYSIS: ICD-10-CM

## 2021-08-05 DIAGNOSIS — Z01.818 OTHER SPECIFIED PRE-OPERATIVE EXAMINATION: Primary | ICD-10-CM

## 2021-08-05 DIAGNOSIS — N18.6 END STAGE RENAL FAILURE ON DIALYSIS: ICD-10-CM

## 2021-08-05 DIAGNOSIS — Z01.818 PREOP EXAMINATION: ICD-10-CM

## 2021-08-05 DIAGNOSIS — I77.0 AVF (ARTERIOVENOUS FISTULA): ICD-10-CM

## 2021-08-05 DIAGNOSIS — Z01.818 PRE-OPERATIVE EXAMINATION: Primary | ICD-10-CM

## 2021-08-05 PROCEDURE — 99215 OFFICE O/P EST HI 40 MIN: CPT | Mod: PBBFAC,TXP | Performed by: SURGERY

## 2021-08-05 PROCEDURE — 99215 OFFICE O/P EST HI 40 MIN: CPT | Mod: S$PBB,NTX,, | Performed by: SURGERY

## 2021-08-05 PROCEDURE — 99215 PR OFFICE/OUTPT VISIT, EST, LEVL V, 40-54 MIN: ICD-10-PCS | Mod: S$PBB,NTX,, | Performed by: SURGERY

## 2021-08-05 PROCEDURE — 99999 PR PBB SHADOW E&M-EST. PATIENT-LVL V: CPT | Mod: PBBFAC,TXP,, | Performed by: SURGERY

## 2021-08-05 PROCEDURE — 99999 PR PBB SHADOW E&M-EST. PATIENT-LVL V: ICD-10-PCS | Mod: PBBFAC,TXP,, | Performed by: SURGERY

## 2021-08-05 PROCEDURE — 93990 DOPPLER FLOW TESTING: CPT | Mod: PBBFAC,NTX | Performed by: SURGERY

## 2021-08-05 PROCEDURE — 93990 PR DUPLEX HEMODIALYSIS ACCESS: ICD-10-PCS | Mod: 26,S$PBB,TXP, | Performed by: SURGERY

## 2021-08-05 PROCEDURE — 93990 DOPPLER FLOW TESTING: CPT | Mod: 26,S$PBB,TXP, | Performed by: SURGERY

## 2021-08-05 RX ORDER — CALCITRIOL 0.5 UG/1
0.5 CAPSULE ORAL DAILY
Status: ON HOLD | COMMUNITY
Start: 2021-07-29 | End: 2022-11-08 | Stop reason: HOSPADM

## 2021-08-06 ENCOUNTER — LAB VISIT (OUTPATIENT)
Dept: LAB | Facility: HOSPITAL | Age: 26
End: 2021-08-06
Attending: SURGERY
Payer: MEDICAID

## 2021-08-06 ENCOUNTER — PATIENT MESSAGE (OUTPATIENT)
Dept: INTERNAL MEDICINE | Facility: CLINIC | Age: 26
End: 2021-08-06

## 2021-08-06 ENCOUNTER — OFFICE VISIT (OUTPATIENT)
Dept: PULMONOLOGY | Facility: CLINIC | Age: 26
End: 2021-08-06
Payer: MEDICAID

## 2021-08-06 VITALS
DIASTOLIC BLOOD PRESSURE: 60 MMHG | WEIGHT: 150.81 LBS | OXYGEN SATURATION: 97 % | HEART RATE: 99 BPM | BODY MASS INDEX: 25.75 KG/M2 | HEIGHT: 64 IN | SYSTOLIC BLOOD PRESSURE: 120 MMHG

## 2021-08-06 DIAGNOSIS — Z01.818 PRE-OPERATIVE EXAMINATION: ICD-10-CM

## 2021-08-06 DIAGNOSIS — R06.09 DYSPNEA ON EXERTION: Primary | ICD-10-CM

## 2021-08-06 DIAGNOSIS — I42.9 CARDIOMYOPATHY, UNSPECIFIED TYPE: ICD-10-CM

## 2021-08-06 DIAGNOSIS — Z01.818 PRE-OP TESTING: ICD-10-CM

## 2021-08-06 DIAGNOSIS — Z01.818 OTHER SPECIFIED PRE-OPERATIVE EXAMINATION: ICD-10-CM

## 2021-08-06 DIAGNOSIS — R06.02 SOB (SHORTNESS OF BREATH): ICD-10-CM

## 2021-08-06 LAB
ALBUMIN SERPL BCP-MCNC: 3 G/DL (ref 3.5–5.2)
ALP SERPL-CCNC: 78 U/L (ref 55–135)
ALT SERPL W/O P-5'-P-CCNC: 11 U/L (ref 10–44)
ANION GAP SERPL CALC-SCNC: 8 MMOL/L (ref 8–16)
AST SERPL-CCNC: 15 U/L (ref 10–40)
BASOPHILS # BLD AUTO: 0.04 K/UL (ref 0–0.2)
BASOPHILS NFR BLD: 0.6 % (ref 0–1.9)
BILIRUB SERPL-MCNC: 0.3 MG/DL (ref 0.1–1)
BUN SERPL-MCNC: 22 MG/DL (ref 6–20)
CALCIUM SERPL-MCNC: 9 MG/DL (ref 8.7–10.5)
CHLORIDE SERPL-SCNC: 97 MMOL/L (ref 95–110)
CO2 SERPL-SCNC: 33 MMOL/L (ref 23–29)
CREAT SERPL-MCNC: 4.5 MG/DL (ref 0.5–1.4)
DIFFERENTIAL METHOD: ABNORMAL
EOSINOPHIL # BLD AUTO: 0.3 K/UL (ref 0–0.5)
EOSINOPHIL NFR BLD: 4 % (ref 0–8)
ERYTHROCYTE [DISTWIDTH] IN BLOOD BY AUTOMATED COUNT: 14.6 % (ref 11.5–14.5)
EST. GFR  (AFRICAN AMERICAN): 14.6 ML/MIN/1.73 M^2
EST. GFR  (NON AFRICAN AMERICAN): 12.6 ML/MIN/1.73 M^2
GLUCOSE SERPL-MCNC: 251 MG/DL (ref 70–110)
HCT VFR BLD AUTO: 33.5 % (ref 37–48.5)
HGB BLD-MCNC: 10.3 G/DL (ref 12–16)
IMM GRANULOCYTES # BLD AUTO: 0.02 K/UL (ref 0–0.04)
IMM GRANULOCYTES NFR BLD AUTO: 0.3 % (ref 0–0.5)
LYMPHOCYTES # BLD AUTO: 1.9 K/UL (ref 1–4.8)
LYMPHOCYTES NFR BLD: 26.8 % (ref 18–48)
MCH RBC QN AUTO: 29.2 PG (ref 27–31)
MCHC RBC AUTO-ENTMCNC: 30.7 G/DL (ref 32–36)
MCV RBC AUTO: 95 FL (ref 82–98)
MONOCYTES # BLD AUTO: 0.5 K/UL (ref 0.3–1)
MONOCYTES NFR BLD: 7.5 % (ref 4–15)
NEUTROPHILS # BLD AUTO: 4.4 K/UL (ref 1.8–7.7)
NEUTROPHILS NFR BLD: 60.8 % (ref 38–73)
NRBC BLD-RTO: 0 /100 WBC
PLATELET # BLD AUTO: 252 K/UL (ref 150–450)
PMV BLD AUTO: 11.2 FL (ref 9.2–12.9)
POTASSIUM SERPL-SCNC: 3.9 MMOL/L (ref 3.5–5.1)
PROT SERPL-MCNC: 6.9 G/DL (ref 6–8.4)
RBC # BLD AUTO: 3.53 M/UL (ref 4–5.4)
SODIUM SERPL-SCNC: 138 MMOL/L (ref 136–145)
WBC # BLD AUTO: 7.19 K/UL (ref 3.9–12.7)

## 2021-08-06 PROCEDURE — 80053 COMPREHEN METABOLIC PANEL: CPT | Mod: TXP | Performed by: SURGERY

## 2021-08-06 PROCEDURE — 99214 OFFICE O/P EST MOD 30 MIN: CPT | Mod: PBBFAC,TXP | Performed by: STUDENT IN AN ORGANIZED HEALTH CARE EDUCATION/TRAINING PROGRAM

## 2021-08-06 PROCEDURE — 85025 COMPLETE CBC W/AUTO DIFF WBC: CPT | Mod: TXP | Performed by: SURGERY

## 2021-08-06 PROCEDURE — 99999 PR PBB SHADOW E&M-EST. PATIENT-LVL IV: CPT | Mod: PBBFAC,TXP,, | Performed by: STUDENT IN AN ORGANIZED HEALTH CARE EDUCATION/TRAINING PROGRAM

## 2021-08-06 PROCEDURE — 99999 PR PBB SHADOW E&M-EST. PATIENT-LVL IV: ICD-10-PCS | Mod: PBBFAC,TXP,, | Performed by: STUDENT IN AN ORGANIZED HEALTH CARE EDUCATION/TRAINING PROGRAM

## 2021-08-06 PROCEDURE — 99205 PR OFFICE/OUTPT VISIT, NEW, LEVL V, 60-74 MIN: ICD-10-PCS | Mod: S$PBB,TXP,, | Performed by: STUDENT IN AN ORGANIZED HEALTH CARE EDUCATION/TRAINING PROGRAM

## 2021-08-06 PROCEDURE — 99205 OFFICE O/P NEW HI 60 MIN: CPT | Mod: S$PBB,TXP,, | Performed by: STUDENT IN AN ORGANIZED HEALTH CARE EDUCATION/TRAINING PROGRAM

## 2021-08-08 ENCOUNTER — LAB VISIT (OUTPATIENT)
Dept: URGENT CARE | Facility: CLINIC | Age: 26
End: 2021-08-08
Payer: MEDICARE

## 2021-08-08 DIAGNOSIS — Z01.818 PREOP EXAMINATION: ICD-10-CM

## 2021-08-08 PROCEDURE — U0005 INFEC AGEN DETEC AMPLI PROBE: HCPCS | Performed by: SURGERY

## 2021-08-08 PROCEDURE — 99211 OFF/OP EST MAY X REQ PHY/QHP: CPT | Mod: S$GLB,TXP,, | Performed by: FAMILY MEDICINE

## 2021-08-08 PROCEDURE — U0003 INFECTIOUS AGENT DETECTION BY NUCLEIC ACID (DNA OR RNA); SEVERE ACUTE RESPIRATORY SYNDROME CORONAVIRUS 2 (SARS-COV-2) (CORONAVIRUS DISEASE [COVID-19]), AMPLIFIED PROBE TECHNIQUE, MAKING USE OF HIGH THROUGHPUT TECHNOLOGIES AS DESCRIBED BY CMS-2020-01-R: HCPCS | Mod: NTX | Performed by: SURGERY

## 2021-08-08 PROCEDURE — 99211 PR OFFICE/OUTPT VISIT, EST, LEVL I: ICD-10-PCS | Mod: S$GLB,TXP,, | Performed by: FAMILY MEDICINE

## 2021-08-09 LAB
SARS-COV-2 RNA RESP QL NAA+PROBE: NOT DETECTED
SARS-COV-2- CYCLE NUMBER: -1

## 2021-08-11 ENCOUNTER — HOSPITAL ENCOUNTER (OUTPATIENT)
Facility: HOSPITAL | Age: 26
Discharge: HOME | End: 2021-08-11
Attending: SURGERY | Admitting: SURGERY
Payer: MEDICARE

## 2021-08-11 VITALS
SYSTOLIC BLOOD PRESSURE: 142 MMHG | HEIGHT: 64 IN | BODY MASS INDEX: 24.75 KG/M2 | DIASTOLIC BLOOD PRESSURE: 84 MMHG | WEIGHT: 145 LBS | OXYGEN SATURATION: 93 % | HEART RATE: 84 BPM | TEMPERATURE: 98 F | RESPIRATION RATE: 18 BRPM

## 2021-08-11 DIAGNOSIS — T82.898A ARTERIOVENOUS FISTULA OCCLUSION, INITIAL ENCOUNTER: Primary | ICD-10-CM

## 2021-08-11 DIAGNOSIS — N18.6 END STAGE RENAL FAILURE ON DIALYSIS: Primary | ICD-10-CM

## 2021-08-11 DIAGNOSIS — Z99.2 END STAGE RENAL FAILURE ON DIALYSIS: Primary | ICD-10-CM

## 2021-08-11 LAB
HCG INTACT+B SERPL-ACNC: <2.4 MIU/ML
POCT GLUCOSE: 151 MG/DL (ref 70–110)
POCT GLUCOSE: 78 MG/DL (ref 70–110)
POCT GLUCOSE: 85 MG/DL (ref 70–110)

## 2021-08-11 PROCEDURE — 63600175 PHARM REV CODE 636 W HCPCS: Mod: NTX | Performed by: SURGERY

## 2021-08-11 PROCEDURE — 25000003 PHARM REV CODE 250: Mod: TXP | Performed by: STUDENT IN AN ORGANIZED HEALTH CARE EDUCATION/TRAINING PROGRAM

## 2021-08-11 PROCEDURE — 99152 MOD SED SAME PHYS/QHP 5/>YRS: CPT | Mod: NTX | Performed by: SURGERY

## 2021-08-11 PROCEDURE — 63600175 PHARM REV CODE 636 W HCPCS: Mod: TXP | Performed by: SURGERY

## 2021-08-11 PROCEDURE — 36902 INTRO CATH DIALYSIS CIRCUIT: CPT | Mod: NTX,,, | Performed by: SURGERY

## 2021-08-11 PROCEDURE — 36902 INTRO CATH DIALYSIS CIRCUIT: CPT | Mod: TXP | Performed by: SURGERY

## 2021-08-11 PROCEDURE — 25500020 PHARM REV CODE 255: Mod: NTX | Performed by: SURGERY

## 2021-08-11 PROCEDURE — 99152 MOD SED SAME PHYS/QHP 5/>YRS: CPT | Mod: NTX,,, | Performed by: SURGERY

## 2021-08-11 PROCEDURE — 36902 PR INTRO CATH, DIALYSIS CIRCUIT W/TRANSLML BALLOON ANGIO: ICD-10-PCS | Mod: NTX,,, | Performed by: SURGERY

## 2021-08-11 PROCEDURE — C1725 CATH, TRANSLUMIN NON-LASER: HCPCS | Mod: TXP | Performed by: SURGERY

## 2021-08-11 PROCEDURE — 25000003 PHARM REV CODE 250: Mod: NTX | Performed by: SURGERY

## 2021-08-11 PROCEDURE — 27201423 OPTIME MED/SURG SUP & DEVICES STERILE SUPPLY: Mod: NTX | Performed by: SURGERY

## 2021-08-11 PROCEDURE — 82962 GLUCOSE BLOOD TEST: CPT | Mod: TXP | Performed by: SURGERY

## 2021-08-11 PROCEDURE — 99152 PR MOD CONSCIOUS SEDATION, SAME PHYS, 5+ YRS, FIRST 15 MIN: ICD-10-PCS | Mod: NTX,,, | Performed by: SURGERY

## 2021-08-11 PROCEDURE — C1769 GUIDE WIRE: HCPCS | Mod: TXP | Performed by: SURGERY

## 2021-08-11 PROCEDURE — C1894 INTRO/SHEATH, NON-LASER: HCPCS | Mod: NTX | Performed by: SURGERY

## 2021-08-11 PROCEDURE — 84702 CHORIONIC GONADOTROPIN TEST: CPT | Mod: TXP | Performed by: SURGERY

## 2021-08-11 PROCEDURE — 25000003 PHARM REV CODE 250: Mod: TXP | Performed by: SURGERY

## 2021-08-11 RX ORDER — HEPARIN SODIUM 1000 [USP'U]/ML
INJECTION, SOLUTION INTRAVENOUS; SUBCUTANEOUS
Status: DISCONTINUED | OUTPATIENT
Start: 2021-08-11 | End: 2021-08-11 | Stop reason: HOSPADM

## 2021-08-11 RX ORDER — LIDOCAINE HYDROCHLORIDE 10 MG/ML
1 INJECTION, SOLUTION EPIDURAL; INFILTRATION; INTRACAUDAL; PERINEURAL ONCE
Status: DISCONTINUED | OUTPATIENT
Start: 2021-08-11 | End: 2021-08-11 | Stop reason: HOSPADM

## 2021-08-11 RX ORDER — LIDOCAINE HYDROCHLORIDE 20 MG/ML
INJECTION, SOLUTION INFILTRATION; PERINEURAL
Status: DISCONTINUED | OUTPATIENT
Start: 2021-08-11 | End: 2021-08-11 | Stop reason: HOSPADM

## 2021-08-11 RX ORDER — SODIUM CHLORIDE 0.9 % (FLUSH) 0.9 %
10 SYRINGE (ML) INJECTION
Status: DISCONTINUED | OUTPATIENT
Start: 2021-08-11 | End: 2021-08-11 | Stop reason: HOSPADM

## 2021-08-11 RX ORDER — HEPARIN SOD,PORCINE/0.9 % NACL 1000/500ML
INTRAVENOUS SOLUTION INTRAVENOUS
Status: DISCONTINUED | OUTPATIENT
Start: 2021-08-11 | End: 2021-08-11 | Stop reason: HOSPADM

## 2021-08-11 RX ORDER — MIDAZOLAM HYDROCHLORIDE 1 MG/ML
INJECTION, SOLUTION INTRAMUSCULAR; INTRAVENOUS
Status: DISCONTINUED | OUTPATIENT
Start: 2021-08-11 | End: 2021-08-11 | Stop reason: HOSPADM

## 2021-08-11 RX ORDER — MUPIROCIN 20 MG/G
OINTMENT TOPICAL
Status: DISCONTINUED | OUTPATIENT
Start: 2021-08-11 | End: 2021-08-11 | Stop reason: HOSPADM

## 2021-08-11 RX ORDER — FENTANYL CITRATE 50 UG/ML
INJECTION, SOLUTION INTRAMUSCULAR; INTRAVENOUS
Status: DISCONTINUED | OUTPATIENT
Start: 2021-08-11 | End: 2021-08-11 | Stop reason: HOSPADM

## 2021-08-11 RX ADMIN — DEXTROSE MONOHYDRATE 25 G: 25 INJECTION, SOLUTION INTRAVENOUS at 08:08

## 2021-08-12 LAB — POCT GLUCOSE: 139 MG/DL (ref 70–110)

## 2021-08-13 ENCOUNTER — LAB VISIT (OUTPATIENT)
Dept: SPORTS MEDICINE | Facility: CLINIC | Age: 26
End: 2021-08-13
Payer: MEDICARE

## 2021-08-13 ENCOUNTER — OFFICE VISIT (OUTPATIENT)
Dept: OPHTHALMOLOGY | Facility: CLINIC | Age: 26
End: 2021-08-13
Payer: MEDICARE

## 2021-08-13 DIAGNOSIS — Z01.818 PRE-OP TESTING: ICD-10-CM

## 2021-08-13 DIAGNOSIS — E10.3592 TYPE 1 DIABETES MELLITUS WITH PROLIFERATIVE RETINOPATHY OF LEFT EYE WITHOUT MACULAR EDEMA: ICD-10-CM

## 2021-08-13 DIAGNOSIS — E10.3531: ICD-10-CM

## 2021-08-13 LAB — SARS-COV-2 RNA RESP QL NAA+PROBE: NOT DETECTED

## 2021-08-13 PROCEDURE — 92235 FLUORESCEIN ANGRPH MLTIFRAME: CPT | Mod: 50,PBBFAC,NTX | Performed by: OPHTHALMOLOGY

## 2021-08-13 PROCEDURE — 92134 POSTERIOR SEGMENT OCT RETINA (OCULAR COHERENCE TOMOGRAPHY)-BOTH EYES: ICD-10-PCS | Mod: 26,S$PBB,, | Performed by: OPHTHALMOLOGY

## 2021-08-13 PROCEDURE — 92004 COMPRE OPH EXAM NEW PT 1/>: CPT | Mod: S$PBB,,, | Performed by: OPHTHALMOLOGY

## 2021-08-13 PROCEDURE — U0005 INFEC AGEN DETEC AMPLI PROBE: HCPCS | Mod: NTX | Performed by: STUDENT IN AN ORGANIZED HEALTH CARE EDUCATION/TRAINING PROGRAM

## 2021-08-13 PROCEDURE — 92201 PR OPHTHALMOSCOPY, EXT, W/RET DRAW/SCLERAL DEPR, I&R, UNI/BI: ICD-10-PCS | Mod: S$PBB,,, | Performed by: OPHTHALMOLOGY

## 2021-08-13 PROCEDURE — 99999 PR PBB SHADOW E&M-EST. PATIENT-LVL IV: ICD-10-PCS | Mod: PBBFAC,,, | Performed by: OPHTHALMOLOGY

## 2021-08-13 PROCEDURE — 92235 FLUORESCEIN ANGIOGRAPHY - OU - BOTH EYES: ICD-10-PCS | Mod: 26,S$PBB,, | Performed by: OPHTHALMOLOGY

## 2021-08-13 PROCEDURE — 92134 CPTRZ OPH DX IMG PST SGM RTA: CPT | Mod: PBBFAC | Performed by: OPHTHALMOLOGY

## 2021-08-13 PROCEDURE — 99999 PR PBB SHADOW E&M-EST. PATIENT-LVL IV: CPT | Mod: PBBFAC,,, | Performed by: OPHTHALMOLOGY

## 2021-08-13 PROCEDURE — 92201 OPSCPY EXTND RTA DRAW UNI/BI: CPT | Mod: S$PBB,,, | Performed by: OPHTHALMOLOGY

## 2021-08-13 PROCEDURE — 99214 OFFICE O/P EST MOD 30 MIN: CPT | Mod: PBBFAC | Performed by: OPHTHALMOLOGY

## 2021-08-13 PROCEDURE — U0003 INFECTIOUS AGENT DETECTION BY NUCLEIC ACID (DNA OR RNA); SEVERE ACUTE RESPIRATORY SYNDROME CORONAVIRUS 2 (SARS-COV-2) (CORONAVIRUS DISEASE [COVID-19]), AMPLIFIED PROBE TECHNIQUE, MAKING USE OF HIGH THROUGHPUT TECHNOLOGIES AS DESCRIBED BY CMS-2020-01-R: HCPCS | Mod: TXP | Performed by: STUDENT IN AN ORGANIZED HEALTH CARE EDUCATION/TRAINING PROGRAM

## 2021-08-13 PROCEDURE — 92004 PR EYE EXAM, NEW PATIENT,COMPREHESV: ICD-10-PCS | Mod: S$PBB,,, | Performed by: OPHTHALMOLOGY

## 2021-08-13 PROCEDURE — 92201 OPSCPY EXTND RTA DRAW UNI/BI: CPT | Mod: PBBFAC,NTX | Performed by: OPHTHALMOLOGY

## 2021-08-13 RX ORDER — FLUORESCEIN 500 MG/ML
5 INJECTION INTRAVENOUS ONCE
Status: COMPLETED | OUTPATIENT
Start: 2021-08-13 | End: 2021-08-13

## 2021-08-13 RX ADMIN — FLUORESCEIN 500 MG: 500 INJECTION INTRAVENOUS at 09:08

## 2021-08-16 ENCOUNTER — HOSPITAL ENCOUNTER (OUTPATIENT)
Dept: PULMONOLOGY | Facility: CLINIC | Age: 26
Discharge: HOME | End: 2021-08-16
Payer: MEDICARE

## 2021-08-16 DIAGNOSIS — R06.09 DYSPNEA ON EXERTION: ICD-10-CM

## 2021-08-16 PROCEDURE — 94060 PR EVAL OF BRONCHOSPASM: ICD-10-PCS | Mod: 26,S$PBB,TXP, | Performed by: INTERNAL MEDICINE

## 2021-08-16 PROCEDURE — 94727 GAS DIL/WSHOT DETER LNG VOL: CPT | Mod: 26,S$PBB,TXP, | Performed by: INTERNAL MEDICINE

## 2021-08-16 PROCEDURE — 94729 DIFFUSING CAPACITY: CPT | Mod: PBBFAC,NTX | Performed by: INTERNAL MEDICINE

## 2021-08-16 PROCEDURE — 94729 PR C02/MEMBANE DIFFUSE CAPACITY: ICD-10-PCS | Mod: 26,S$PBB,TXP, | Performed by: INTERNAL MEDICINE

## 2021-08-16 PROCEDURE — 94727 PR PULM FUNCTION TEST BY GAS: ICD-10-PCS | Mod: 26,S$PBB,TXP, | Performed by: INTERNAL MEDICINE

## 2021-08-16 PROCEDURE — 94727 GAS DIL/WSHOT DETER LNG VOL: CPT | Mod: PBBFAC,TXP | Performed by: INTERNAL MEDICINE

## 2021-08-16 PROCEDURE — 94060 EVALUATION OF WHEEZING: CPT | Mod: PBBFAC,NTX | Performed by: INTERNAL MEDICINE

## 2021-08-16 PROCEDURE — 94729 DIFFUSING CAPACITY: CPT | Mod: 26,S$PBB,TXP, | Performed by: INTERNAL MEDICINE

## 2021-08-16 PROCEDURE — 94060 EVALUATION OF WHEEZING: CPT | Mod: 26,S$PBB,TXP, | Performed by: INTERNAL MEDICINE

## 2021-08-22 ENCOUNTER — PATIENT OUTREACH (OUTPATIENT)
Dept: ADMINISTRATIVE | Facility: OTHER | Age: 26
End: 2021-08-22

## 2021-08-22 DIAGNOSIS — E10.22 TYPE 1 DIABETES MELLITUS WITH STAGE 3B CHRONIC KIDNEY DISEASE: Primary | ICD-10-CM

## 2021-08-22 DIAGNOSIS — N18.32 TYPE 1 DIABETES MELLITUS WITH STAGE 3B CHRONIC KIDNEY DISEASE: Primary | ICD-10-CM

## 2021-09-12 DIAGNOSIS — N18.5 TYPE 1 DIABETES MELLITUS WITH STAGE 5 CHRONIC KIDNEY DISEASE NOT ON CHRONIC DIALYSIS: ICD-10-CM

## 2021-09-12 DIAGNOSIS — E10.22 TYPE 1 DIABETES MELLITUS WITH STAGE 5 CHRONIC KIDNEY DISEASE NOT ON CHRONIC DIALYSIS: ICD-10-CM

## 2021-09-13 RX ORDER — INSULIN LISPRO 100 [IU]/ML
INJECTION, SOLUTION INTRAVENOUS; SUBCUTANEOUS
Qty: 15 ML | Refills: 6 | Status: SHIPPED | OUTPATIENT
Start: 2021-09-13 | End: 2021-11-16 | Stop reason: SDUPTHER

## 2021-09-16 ENCOUNTER — PATIENT MESSAGE (OUTPATIENT)
Dept: INTERNAL MEDICINE | Facility: CLINIC | Age: 26
End: 2021-09-16

## 2021-09-17 ENCOUNTER — HOSPITAL ENCOUNTER (INPATIENT)
Facility: HOSPITAL | Age: 26
LOS: 1 days | Discharge: HOME | DRG: 304 | End: 2021-09-18
Attending: EMERGENCY MEDICINE | Admitting: INTERNAL MEDICINE
Payer: MEDICARE

## 2021-09-17 ENCOUNTER — PATIENT MESSAGE (OUTPATIENT)
Dept: INTERNAL MEDICINE | Facility: CLINIC | Age: 26
End: 2021-09-17

## 2021-09-17 DIAGNOSIS — N18.6 ESRD (END STAGE RENAL DISEASE) ON DIALYSIS: ICD-10-CM

## 2021-09-17 DIAGNOSIS — I16.1 HYPERTENSIVE EMERGENCY: ICD-10-CM

## 2021-09-17 DIAGNOSIS — R07.9 CHEST PAIN: ICD-10-CM

## 2021-09-17 DIAGNOSIS — J81.0 FLASH PULMONARY EDEMA: Primary | ICD-10-CM

## 2021-09-17 DIAGNOSIS — J96.01 ACUTE HYPOXEMIC RESPIRATORY FAILURE: ICD-10-CM

## 2021-09-17 DIAGNOSIS — Z99.2 ESRD (END STAGE RENAL DISEASE) ON DIALYSIS: ICD-10-CM

## 2021-09-17 PROBLEM — I10 ESSENTIAL HYPERTENSION: Status: ACTIVE | Noted: 2021-09-17

## 2021-09-17 LAB
ALBUMIN SERPL BCP-MCNC: 2.8 G/DL (ref 3.5–5.2)
ALP SERPL-CCNC: 84 U/L (ref 55–135)
ALT SERPL W/O P-5'-P-CCNC: 16 U/L (ref 10–44)
ANION GAP SERPL CALC-SCNC: 10 MMOL/L (ref 8–16)
AST SERPL-CCNC: 18 U/L (ref 10–40)
BASOPHILS # BLD AUTO: 0.05 K/UL (ref 0–0.2)
BASOPHILS # BLD AUTO: 0.06 K/UL (ref 0–0.2)
BASOPHILS NFR BLD: 0.3 % (ref 0–1.9)
BASOPHILS NFR BLD: 0.4 % (ref 0–1.9)
BILIRUB SERPL-MCNC: 0.5 MG/DL (ref 0.1–1)
BNP SERPL-MCNC: 2071 PG/ML (ref 0–99)
BUN SERPL-MCNC: 8 MG/DL (ref 6–20)
BUN SERPL-MCNC: 8 MG/DL (ref 6–30)
CALCIUM SERPL-MCNC: 9.1 MG/DL (ref 8.7–10.5)
CHLORIDE SERPL-SCNC: 95 MMOL/L (ref 95–110)
CHLORIDE SERPL-SCNC: 96 MMOL/L (ref 95–110)
CO2 SERPL-SCNC: 28 MMOL/L (ref 23–29)
CREAT SERPL-MCNC: 3.6 MG/DL (ref 0.5–1.4)
CREAT SERPL-MCNC: 4.2 MG/DL (ref 0.5–1.4)
CTP QC/QA: YES
DIFFERENTIAL METHOD: ABNORMAL
DIFFERENTIAL METHOD: ABNORMAL
EOSINOPHIL # BLD AUTO: 0.2 K/UL (ref 0–0.5)
EOSINOPHIL # BLD AUTO: 0.4 K/UL (ref 0–0.5)
EOSINOPHIL NFR BLD: 1.5 % (ref 0–8)
EOSINOPHIL NFR BLD: 2.6 % (ref 0–8)
ERYTHROCYTE [DISTWIDTH] IN BLOOD BY AUTOMATED COUNT: 15.9 % (ref 11.5–14.5)
ERYTHROCYTE [DISTWIDTH] IN BLOOD BY AUTOMATED COUNT: 16.4 % (ref 11.5–14.5)
EST. GFR  (AFRICAN AMERICAN): 19.1 ML/MIN/1.73 M^2
EST. GFR  (NON AFRICAN AMERICAN): 16.6 ML/MIN/1.73 M^2
GLUCOSE SERPL-MCNC: 144 MG/DL (ref 70–110)
GLUCOSE SERPL-MCNC: 148 MG/DL (ref 70–110)
HCG INTACT+B SERPL-ACNC: <2.4 MIU/ML
HCT VFR BLD AUTO: 33.9 % (ref 37–48.5)
HCT VFR BLD AUTO: 38.3 % (ref 37–48.5)
HCT VFR BLD CALC: 43 %PCV (ref 36–54)
HGB BLD-MCNC: 10.7 G/DL (ref 12–16)
HGB BLD-MCNC: 12.3 G/DL (ref 12–16)
IMM GRANULOCYTES # BLD AUTO: 0.05 K/UL (ref 0–0.04)
IMM GRANULOCYTES # BLD AUTO: 0.06 K/UL (ref 0–0.04)
IMM GRANULOCYTES NFR BLD AUTO: 0.3 % (ref 0–0.5)
IMM GRANULOCYTES NFR BLD AUTO: 0.4 % (ref 0–0.5)
LACTATE SERPL-SCNC: 2.1 MMOL/L (ref 0.5–2.2)
LYMPHOCYTES # BLD AUTO: 2 K/UL (ref 1–4.8)
LYMPHOCYTES # BLD AUTO: 2.3 K/UL (ref 1–4.8)
LYMPHOCYTES NFR BLD: 13.9 % (ref 18–48)
LYMPHOCYTES NFR BLD: 15.6 % (ref 18–48)
MCH RBC QN AUTO: 28.8 PG (ref 27–31)
MCH RBC QN AUTO: 29.4 PG (ref 27–31)
MCHC RBC AUTO-ENTMCNC: 31.6 G/DL (ref 32–36)
MCHC RBC AUTO-ENTMCNC: 32.1 G/DL (ref 32–36)
MCV RBC AUTO: 91 FL (ref 82–98)
MCV RBC AUTO: 91 FL (ref 82–98)
MONOCYTES # BLD AUTO: 0.8 K/UL (ref 0.3–1)
MONOCYTES # BLD AUTO: 0.9 K/UL (ref 0.3–1)
MONOCYTES NFR BLD: 5.3 % (ref 4–15)
MONOCYTES NFR BLD: 6 % (ref 4–15)
NEUTROPHILS # BLD AUTO: 11 K/UL (ref 1.8–7.7)
NEUTROPHILS # BLD AUTO: 11.1 K/UL (ref 1.8–7.7)
NEUTROPHILS NFR BLD: 75.7 % (ref 38–73)
NEUTROPHILS NFR BLD: 78 % (ref 38–73)
NRBC BLD-RTO: 0 /100 WBC
NRBC BLD-RTO: 0 /100 WBC
PLATELET # BLD AUTO: 239 K/UL (ref 150–450)
PLATELET # BLD AUTO: 270 K/UL (ref 150–450)
PMV BLD AUTO: 11.2 FL (ref 9.2–12.9)
PMV BLD AUTO: 11.5 FL (ref 9.2–12.9)
POC IONIZED CALCIUM: 1.23 MMOL/L (ref 1.06–1.42)
POC TCO2 (MEASURED): 31 MMOL/L (ref 23–29)
POCT GLUCOSE: 222 MG/DL (ref 70–110)
POCT GLUCOSE: 277 MG/DL (ref 70–110)
POTASSIUM BLD-SCNC: 4.7 MMOL/L (ref 3.5–5.1)
POTASSIUM SERPL-SCNC: 4.5 MMOL/L (ref 3.5–5.1)
PROT SERPL-MCNC: 6.9 G/DL (ref 6–8.4)
RBC # BLD AUTO: 3.71 M/UL (ref 4–5.4)
RBC # BLD AUTO: 4.19 M/UL (ref 4–5.4)
SAMPLE: ABNORMAL
SARS-COV-2 RDRP RESP QL NAA+PROBE: NEGATIVE
SODIUM BLD-SCNC: 138 MMOL/L (ref 136–145)
SODIUM SERPL-SCNC: 134 MMOL/L (ref 136–145)
TROPONIN I SERPL DL<=0.01 NG/ML-MCNC: 0.01 NG/ML (ref 0–0.03)
WBC # BLD AUTO: 14.29 K/UL (ref 3.9–12.7)
WBC # BLD AUTO: 14.45 K/UL (ref 3.9–12.7)

## 2021-09-17 PROCEDURE — 25000003 PHARM REV CODE 250: Mod: NTX | Performed by: STUDENT IN AN ORGANIZED HEALTH CARE EDUCATION/TRAINING PROGRAM

## 2021-09-17 PROCEDURE — 93005 ELECTROCARDIOGRAM TRACING: CPT | Mod: NTX

## 2021-09-17 PROCEDURE — 84484 ASSAY OF TROPONIN QUANT: CPT | Mod: NTX | Performed by: STUDENT IN AN ORGANIZED HEALTH CARE EDUCATION/TRAINING PROGRAM

## 2021-09-17 PROCEDURE — 12000002 HC ACUTE/MED SURGE SEMI-PRIVATE ROOM: Mod: NTX

## 2021-09-17 PROCEDURE — 99291 PR CRITICAL CARE, E/M 30-74 MINUTES: ICD-10-PCS | Mod: NTX,,, | Performed by: INTERNAL MEDICINE

## 2021-09-17 PROCEDURE — 93010 EKG 12-LEAD: ICD-10-PCS | Mod: NTX,,, | Performed by: INTERNAL MEDICINE

## 2021-09-17 PROCEDURE — 99291 CRITICAL CARE FIRST HOUR: CPT | Mod: NTX,,, | Performed by: INTERNAL MEDICINE

## 2021-09-17 PROCEDURE — 63600175 PHARM REV CODE 636 W HCPCS: Mod: NTX | Performed by: STUDENT IN AN ORGANIZED HEALTH CARE EDUCATION/TRAINING PROGRAM

## 2021-09-17 PROCEDURE — 99900035 HC TECH TIME PER 15 MIN (STAT): Mod: NTX

## 2021-09-17 PROCEDURE — 25000003 PHARM REV CODE 250: Mod: NTX

## 2021-09-17 PROCEDURE — 94660 CPAP INITIATION&MGMT: CPT | Mod: NTX

## 2021-09-17 PROCEDURE — 99291 CRITICAL CARE FIRST HOUR: CPT | Mod: CS,,, | Performed by: EMERGENCY MEDICINE

## 2021-09-17 PROCEDURE — 83880 ASSAY OF NATRIURETIC PEPTIDE: CPT | Mod: NTX | Performed by: STUDENT IN AN ORGANIZED HEALTH CARE EDUCATION/TRAINING PROGRAM

## 2021-09-17 PROCEDURE — 93010 ELECTROCARDIOGRAM REPORT: CPT | Mod: NTX,,, | Performed by: INTERNAL MEDICINE

## 2021-09-17 PROCEDURE — 82962 GLUCOSE BLOOD TEST: CPT | Mod: NTX

## 2021-09-17 PROCEDURE — C9399 UNCLASSIFIED DRUGS OR BIOLOG: HCPCS | Mod: NTX | Performed by: STUDENT IN AN ORGANIZED HEALTH CARE EDUCATION/TRAINING PROGRAM

## 2021-09-17 PROCEDURE — 85025 COMPLETE CBC W/AUTO DIFF WBC: CPT | Mod: 91,NTX | Performed by: INTERNAL MEDICINE

## 2021-09-17 PROCEDURE — U0002 COVID-19 LAB TEST NON-CDC: HCPCS | Mod: NTX | Performed by: STUDENT IN AN ORGANIZED HEALTH CARE EDUCATION/TRAINING PROGRAM

## 2021-09-17 PROCEDURE — 87040 BLOOD CULTURE FOR BACTERIA: CPT | Mod: 59,NTX | Performed by: STUDENT IN AN ORGANIZED HEALTH CARE EDUCATION/TRAINING PROGRAM

## 2021-09-17 PROCEDURE — 25000003 PHARM REV CODE 250: Mod: NTX | Performed by: EMERGENCY MEDICINE

## 2021-09-17 PROCEDURE — 80053 COMPREHEN METABOLIC PANEL: CPT | Mod: NTX | Performed by: STUDENT IN AN ORGANIZED HEALTH CARE EDUCATION/TRAINING PROGRAM

## 2021-09-17 PROCEDURE — 27000190 HC CPAP FULL FACE MASK W/VALVE: Mod: NTX

## 2021-09-17 PROCEDURE — 84702 CHORIONIC GONADOTROPIN TEST: CPT | Mod: NTX | Performed by: STUDENT IN AN ORGANIZED HEALTH CARE EDUCATION/TRAINING PROGRAM

## 2021-09-17 PROCEDURE — 99291 CRITICAL CARE FIRST HOUR: CPT | Mod: 25,NTX

## 2021-09-17 PROCEDURE — 83605 ASSAY OF LACTIC ACID: CPT | Mod: NTX | Performed by: STUDENT IN AN ORGANIZED HEALTH CARE EDUCATION/TRAINING PROGRAM

## 2021-09-17 PROCEDURE — 99291 PR CRITICAL CARE, E/M 30-74 MINUTES: ICD-10-PCS | Mod: CS,,, | Performed by: EMERGENCY MEDICINE

## 2021-09-17 PROCEDURE — 85025 COMPLETE CBC W/AUTO DIFF WBC: CPT | Mod: NTX | Performed by: STUDENT IN AN ORGANIZED HEALTH CARE EDUCATION/TRAINING PROGRAM

## 2021-09-17 PROCEDURE — 83036 HEMOGLOBIN GLYCOSYLATED A1C: CPT | Mod: NTX | Performed by: INTERNAL MEDICINE

## 2021-09-17 RX ORDER — GLUCAGON 1 MG
1 KIT INJECTION
Status: DISCONTINUED | OUTPATIENT
Start: 2021-09-17 | End: 2021-09-18 | Stop reason: HOSPADM

## 2021-09-17 RX ORDER — PROMETHAZINE HYDROCHLORIDE 25 MG/1
25 TABLET ORAL EVERY 6 HOURS PRN
Status: DISCONTINUED | OUTPATIENT
Start: 2021-09-17 | End: 2021-09-18 | Stop reason: HOSPADM

## 2021-09-17 RX ORDER — NITROGLYCERIN 20 MG/100ML
5 INJECTION INTRAVENOUS CONTINUOUS
Status: DISCONTINUED | OUTPATIENT
Start: 2021-09-17 | End: 2021-09-17

## 2021-09-17 RX ORDER — NITROGLYCERIN 20 MG/100ML
0-400 INJECTION INTRAVENOUS CONTINUOUS
Status: DISCONTINUED | OUTPATIENT
Start: 2021-09-17 | End: 2021-09-18

## 2021-09-17 RX ORDER — IBUPROFEN 200 MG
24 TABLET ORAL
Status: DISCONTINUED | OUTPATIENT
Start: 2021-09-17 | End: 2021-09-18 | Stop reason: HOSPADM

## 2021-09-17 RX ORDER — ONDANSETRON 2 MG/ML
4 INJECTION INTRAMUSCULAR; INTRAVENOUS EVERY 8 HOURS PRN
Status: DISCONTINUED | OUTPATIENT
Start: 2021-09-17 | End: 2021-09-18 | Stop reason: HOSPADM

## 2021-09-17 RX ORDER — NITROGLYCERIN 20 MG/100ML
INJECTION INTRAVENOUS
Status: COMPLETED
Start: 2021-09-17 | End: 2021-09-17

## 2021-09-17 RX ORDER — LANOLIN ALCOHOL/MO/W.PET/CERES
1 CREAM (GRAM) TOPICAL DAILY
Status: DISCONTINUED | OUTPATIENT
Start: 2021-09-18 | End: 2021-09-18 | Stop reason: HOSPADM

## 2021-09-17 RX ORDER — FUROSEMIDE 10 MG/ML
80 INJECTION INTRAMUSCULAR; INTRAVENOUS ONCE
Status: COMPLETED | OUTPATIENT
Start: 2021-09-17 | End: 2021-09-17

## 2021-09-17 RX ORDER — TALC
6 POWDER (GRAM) TOPICAL NIGHTLY PRN
Status: DISCONTINUED | OUTPATIENT
Start: 2021-09-17 | End: 2021-09-18 | Stop reason: HOSPADM

## 2021-09-17 RX ORDER — ACETAMINOPHEN 325 MG/1
650 TABLET ORAL EVERY 4 HOURS PRN
Status: DISCONTINUED | OUTPATIENT
Start: 2021-09-17 | End: 2021-09-18 | Stop reason: HOSPADM

## 2021-09-17 RX ORDER — ASPIRIN 81 MG/1
81 TABLET ORAL NIGHTLY
Status: DISCONTINUED | OUTPATIENT
Start: 2021-09-17 | End: 2021-09-18 | Stop reason: HOSPADM

## 2021-09-17 RX ORDER — INSULIN ASPART 100 [IU]/ML
0-5 INJECTION, SOLUTION INTRAVENOUS; SUBCUTANEOUS
Status: DISCONTINUED | OUTPATIENT
Start: 2021-09-17 | End: 2021-09-18 | Stop reason: HOSPADM

## 2021-09-17 RX ORDER — CARVEDILOL 25 MG/1
25 TABLET ORAL 2 TIMES DAILY
Status: DISCONTINUED | OUTPATIENT
Start: 2021-09-17 | End: 2021-09-18 | Stop reason: HOSPADM

## 2021-09-17 RX ORDER — SODIUM CHLORIDE 0.9 % (FLUSH) 0.9 %
10 SYRINGE (ML) INJECTION
Status: DISCONTINUED | OUTPATIENT
Start: 2021-09-17 | End: 2021-09-18 | Stop reason: HOSPADM

## 2021-09-17 RX ORDER — ATORVASTATIN CALCIUM 20 MG/1
40 TABLET, FILM COATED ORAL DAILY
Status: DISCONTINUED | OUTPATIENT
Start: 2021-09-18 | End: 2021-09-18

## 2021-09-17 RX ORDER — ESCITALOPRAM OXALATE 20 MG/1
20 TABLET ORAL DAILY
Status: DISCONTINUED | OUTPATIENT
Start: 2021-09-18 | End: 2021-09-18 | Stop reason: HOSPADM

## 2021-09-17 RX ORDER — HYDRALAZINE HYDROCHLORIDE 25 MG/1
25 TABLET, FILM COATED ORAL 2 TIMES DAILY
Status: DISCONTINUED | OUTPATIENT
Start: 2021-09-17 | End: 2021-09-18 | Stop reason: HOSPADM

## 2021-09-17 RX ORDER — FUROSEMIDE 10 MG/ML
40 INJECTION INTRAMUSCULAR; INTRAVENOUS
Status: COMPLETED | OUTPATIENT
Start: 2021-09-17 | End: 2021-09-17

## 2021-09-17 RX ORDER — NIFEDIPINE 60 MG/1
60 TABLET, EXTENDED RELEASE ORAL 2 TIMES DAILY
Status: DISCONTINUED | OUTPATIENT
Start: 2021-09-17 | End: 2021-09-18 | Stop reason: HOSPADM

## 2021-09-17 RX ORDER — NICARDIPINE HYDROCHLORIDE 0.2 MG/ML
0-15 INJECTION INTRAVENOUS CONTINUOUS
Status: DISCONTINUED | OUTPATIENT
Start: 2021-09-17 | End: 2021-09-18

## 2021-09-17 RX ORDER — IBUPROFEN 200 MG
16 TABLET ORAL
Status: DISCONTINUED | OUTPATIENT
Start: 2021-09-17 | End: 2021-09-18 | Stop reason: HOSPADM

## 2021-09-17 RX ORDER — ENALAPRILAT 1.25 MG/ML
1.25 INJECTION INTRAVENOUS
Status: COMPLETED | OUTPATIENT
Start: 2021-09-17 | End: 2021-09-17

## 2021-09-17 RX ORDER — INSULIN ASPART 100 [IU]/ML
10 INJECTION, SOLUTION INTRAVENOUS; SUBCUTANEOUS
Status: DISCONTINUED | OUTPATIENT
Start: 2021-09-18 | End: 2021-09-18 | Stop reason: HOSPADM

## 2021-09-17 RX ADMIN — ENALAPRILAT 1.25 MG: 2.5 INJECTION INTRAVENOUS at 04:09

## 2021-09-17 RX ADMIN — NITROGLYCERIN 400 MCG/MIN: 20 INJECTION INTRAVENOUS at 05:09

## 2021-09-17 RX ADMIN — INSULIN DETEMIR 10 UNITS: 100 INJECTION, SOLUTION SUBCUTANEOUS at 09:09

## 2021-09-17 RX ADMIN — NITROGLYCERIN 50 MCG/MIN: 20 INJECTION INTRAVENOUS at 02:09

## 2021-09-17 RX ADMIN — ASPIRIN 81 MG: 81 TABLET, COATED ORAL at 09:09

## 2021-09-17 RX ADMIN — FUROSEMIDE 80 MG: 10 INJECTION, SOLUTION INTRAMUSCULAR; INTRAVENOUS at 07:09

## 2021-09-17 RX ADMIN — FUROSEMIDE 40 MG: 10 INJECTION, SOLUTION INTRAMUSCULAR; INTRAVENOUS at 04:09

## 2021-09-17 RX ADMIN — NICARDIPINE HYDROCHLORIDE 2.5 MG/HR: 0.2 INJECTION INTRAVENOUS at 05:09

## 2021-09-17 RX ADMIN — CARVEDILOL 25 MG: 25 TABLET, FILM COATED ORAL at 05:09

## 2021-09-17 RX ADMIN — NIFEDIPINE 60 MG: 60 TABLET, FILM COATED, EXTENDED RELEASE ORAL at 05:09

## 2021-09-17 RX ADMIN — HYDRALAZINE HYDROCHLORIDE 25 MG: 25 TABLET, FILM COATED ORAL at 05:09

## 2021-09-18 ENCOUNTER — PATIENT MESSAGE (OUTPATIENT)
Dept: INTERNAL MEDICINE | Facility: CLINIC | Age: 26
End: 2021-09-18

## 2021-09-18 VITALS
WEIGHT: 140.88 LBS | SYSTOLIC BLOOD PRESSURE: 136 MMHG | TEMPERATURE: 98 F | HEART RATE: 87 BPM | HEIGHT: 64 IN | DIASTOLIC BLOOD PRESSURE: 78 MMHG | RESPIRATION RATE: 16 BRPM | OXYGEN SATURATION: 99 % | BODY MASS INDEX: 24.05 KG/M2

## 2021-09-18 PROBLEM — J81.0 FLASH PULMONARY EDEMA: Status: ACTIVE | Noted: 2021-09-18

## 2021-09-18 LAB
ALBUMIN SERPL BCP-MCNC: 2.2 G/DL (ref 3.5–5.2)
ALP SERPL-CCNC: 70 U/L (ref 55–135)
ALT SERPL W/O P-5'-P-CCNC: 11 U/L (ref 10–44)
ANION GAP SERPL CALC-SCNC: 11 MMOL/L (ref 8–16)
AST SERPL-CCNC: 24 U/L (ref 10–40)
BILIRUB SERPL-MCNC: 0.5 MG/DL (ref 0.1–1)
BUN SERPL-MCNC: 10 MG/DL (ref 6–20)
CALCIUM SERPL-MCNC: 7.9 MG/DL (ref 8.7–10.5)
CHLORIDE SERPL-SCNC: 102 MMOL/L (ref 95–110)
CO2 SERPL-SCNC: 21 MMOL/L (ref 23–29)
CREAT SERPL-MCNC: 4.1 MG/DL (ref 0.5–1.4)
EST. GFR  (AFRICAN AMERICAN): 16.3 ML/MIN/1.73 M^2
EST. GFR  (NON AFRICAN AMERICAN): 14.2 ML/MIN/1.73 M^2
ESTIMATED AVG GLUCOSE: 246 MG/DL (ref 68–131)
GLUCOSE SERPL-MCNC: 143 MG/DL (ref 70–110)
HBA1C MFR BLD: 10.2 % (ref 4–5.6)
MAGNESIUM SERPL-MCNC: 1.8 MG/DL (ref 1.6–2.6)
PHOSPHATE SERPL-MCNC: 3.3 MG/DL (ref 2.7–4.5)
POCT GLUCOSE: 114 MG/DL (ref 70–110)
POCT GLUCOSE: 86 MG/DL (ref 70–110)
POCT GLUCOSE: 88 MG/DL (ref 70–110)
POTASSIUM SERPL-SCNC: 5.2 MMOL/L (ref 3.5–5.1)
PROT SERPL-MCNC: 5.3 G/DL (ref 6–8.4)
SODIUM SERPL-SCNC: 134 MMOL/L (ref 136–145)

## 2021-09-18 PROCEDURE — 27000221 HC OXYGEN, UP TO 24 HOURS: Mod: NTX

## 2021-09-18 PROCEDURE — 83735 ASSAY OF MAGNESIUM: CPT | Mod: NTX | Performed by: STUDENT IN AN ORGANIZED HEALTH CARE EDUCATION/TRAINING PROGRAM

## 2021-09-18 PROCEDURE — 99223 1ST HOSP IP/OBS HIGH 75: CPT | Mod: 25,NTX,, | Performed by: NURSE PRACTITIONER

## 2021-09-18 PROCEDURE — 25000003 PHARM REV CODE 250: Mod: NTX | Performed by: STUDENT IN AN ORGANIZED HEALTH CARE EDUCATION/TRAINING PROGRAM

## 2021-09-18 PROCEDURE — 99223 PR INITIAL HOSPITAL CARE,LEVL III: ICD-10-PCS | Mod: 25,NTX,, | Performed by: NURSE PRACTITIONER

## 2021-09-18 PROCEDURE — 84100 ASSAY OF PHOSPHORUS: CPT | Mod: NTX | Performed by: STUDENT IN AN ORGANIZED HEALTH CARE EDUCATION/TRAINING PROGRAM

## 2021-09-18 PROCEDURE — 80053 COMPREHEN METABOLIC PANEL: CPT | Mod: NTX | Performed by: STUDENT IN AN ORGANIZED HEALTH CARE EDUCATION/TRAINING PROGRAM

## 2021-09-18 PROCEDURE — 63600175 PHARM REV CODE 636 W HCPCS: Mod: NTX | Performed by: NURSE PRACTITIONER

## 2021-09-18 PROCEDURE — 90935 HEMODIALYSIS ONE EVALUATION: CPT | Mod: NTX,,, | Performed by: NURSE PRACTITIONER

## 2021-09-18 PROCEDURE — 99239 PR HOSPITAL DISCHARGE DAY,>30 MIN: ICD-10-PCS | Mod: NTX,,, | Performed by: HOSPITALIST

## 2021-09-18 PROCEDURE — 90935 PR HEMODIALYSIS, ONE EVALUATION: ICD-10-PCS | Mod: NTX,,, | Performed by: NURSE PRACTITIONER

## 2021-09-18 PROCEDURE — C9399 UNCLASSIFIED DRUGS OR BIOLOG: HCPCS | Mod: NTX | Performed by: STUDENT IN AN ORGANIZED HEALTH CARE EDUCATION/TRAINING PROGRAM

## 2021-09-18 PROCEDURE — 80100016 HC MAINTENANCE HEMODIALYSIS: Mod: NTX

## 2021-09-18 PROCEDURE — 99239 HOSP IP/OBS DSCHRG MGMT >30: CPT | Mod: NTX,,, | Performed by: HOSPITALIST

## 2021-09-18 RX ORDER — HEPARIN SODIUM 1000 [USP'U]/ML
1000 INJECTION, SOLUTION INTRAVENOUS; SUBCUTANEOUS
Status: DISCONTINUED | OUTPATIENT
Start: 2021-09-18 | End: 2021-09-18 | Stop reason: HOSPADM

## 2021-09-18 RX ORDER — ATORVASTATIN CALCIUM 20 MG/1
40 TABLET, FILM COATED ORAL NIGHTLY
Status: DISCONTINUED | OUTPATIENT
Start: 2021-09-18 | End: 2021-09-18 | Stop reason: HOSPADM

## 2021-09-18 RX ADMIN — NIFEDIPINE 60 MG: 60 TABLET, FILM COATED, EXTENDED RELEASE ORAL at 08:09

## 2021-09-18 RX ADMIN — ESCITALOPRAM OXALATE 20 MG: 20 TABLET ORAL at 08:09

## 2021-09-18 RX ADMIN — CARVEDILOL 25 MG: 25 TABLET, FILM COATED ORAL at 08:09

## 2021-09-18 RX ADMIN — FERROUS SULFATE TAB EC 325 MG (65 MG FE EQUIVALENT) 1 EACH: 325 (65 FE) TABLET DELAYED RESPONSE at 08:09

## 2021-09-18 RX ADMIN — HEPARIN SODIUM 1000 UNITS: 1000 INJECTION, SOLUTION INTRAVENOUS; SUBCUTANEOUS at 05:09

## 2021-09-18 RX ADMIN — INSULIN DETEMIR 10 UNITS: 100 INJECTION, SOLUTION SUBCUTANEOUS at 08:09

## 2021-09-18 RX ADMIN — HYDRALAZINE HYDROCHLORIDE 25 MG: 25 TABLET, FILM COATED ORAL at 08:09

## 2021-09-20 ENCOUNTER — PATIENT MESSAGE (OUTPATIENT)
Dept: ADMINISTRATIVE | Facility: CLINIC | Age: 26
End: 2021-09-20

## 2021-09-20 ENCOUNTER — PATIENT OUTREACH (OUTPATIENT)
Dept: ADMINISTRATIVE | Facility: CLINIC | Age: 26
End: 2021-09-20

## 2021-09-20 LAB — POCT GLUCOSE: 117 MG/DL (ref 70–110)

## 2021-09-22 LAB
BACTERIA BLD CULT: NORMAL
BACTERIA BLD CULT: NORMAL

## 2021-09-23 ENCOUNTER — PATIENT MESSAGE (OUTPATIENT)
Dept: INTERNAL MEDICINE | Facility: CLINIC | Age: 26
End: 2021-09-23

## 2021-09-23 ENCOUNTER — PATIENT MESSAGE (OUTPATIENT)
Dept: OPTOMETRY | Facility: CLINIC | Age: 26
End: 2021-09-23

## 2021-09-23 ENCOUNTER — PATIENT MESSAGE (OUTPATIENT)
Dept: OPHTHALMOLOGY | Facility: CLINIC | Age: 26
End: 2021-09-23

## 2021-09-24 ENCOUNTER — OFFICE VISIT (OUTPATIENT)
Dept: OPHTHALMOLOGY | Facility: CLINIC | Age: 26
End: 2021-09-24
Payer: MEDICARE

## 2021-09-24 DIAGNOSIS — E10.3531: Primary | ICD-10-CM

## 2021-09-24 PROCEDURE — 99213 OFFICE O/P EST LOW 20 MIN: CPT | Mod: PBBFAC | Performed by: OPHTHALMOLOGY

## 2021-09-24 PROCEDURE — 99499 UNLISTED E&M SERVICE: CPT | Mod: S$PBB,,, | Performed by: OPHTHALMOLOGY

## 2021-09-24 PROCEDURE — 67228 TREATMENT X10SV RETINOPATHY: CPT | Mod: S$PBB,RT,, | Performed by: OPHTHALMOLOGY

## 2021-09-24 PROCEDURE — 99999 PR PBB SHADOW E&M-EST. PATIENT-LVL III: ICD-10-PCS | Mod: PBBFAC,,, | Performed by: OPHTHALMOLOGY

## 2021-09-24 PROCEDURE — 99499 NO LOS: ICD-10-PCS | Mod: S$PBB,,, | Performed by: OPHTHALMOLOGY

## 2021-09-24 PROCEDURE — 99999 PR PBB SHADOW E&M-EST. PATIENT-LVL III: CPT | Mod: PBBFAC,,, | Performed by: OPHTHALMOLOGY

## 2021-09-24 PROCEDURE — 67228 TREATMENT X10SV RETINOPATHY: CPT | Mod: PBBFAC,RT | Performed by: OPHTHALMOLOGY

## 2021-09-24 PROCEDURE — 67228 PR TREATMENT EXTENSIVE RETINOPATHY, PHOTOCOAGULATION: ICD-10-PCS | Mod: S$PBB,RT,, | Performed by: OPHTHALMOLOGY

## 2021-09-27 ENCOUNTER — PATIENT OUTREACH (OUTPATIENT)
Dept: ADMINISTRATIVE | Facility: OTHER | Age: 26
End: 2021-09-27

## 2021-09-29 ENCOUNTER — TELEPHONE (OUTPATIENT)
Dept: TRANSPLANT | Facility: CLINIC | Age: 26
End: 2021-09-29

## 2021-09-30 ENCOUNTER — TELEPHONE (OUTPATIENT)
Dept: PULMONOLOGY | Facility: CLINIC | Age: 26
End: 2021-09-30

## 2021-09-30 ENCOUNTER — HOSPITAL ENCOUNTER (OUTPATIENT)
Dept: VASCULAR SURGERY | Facility: CLINIC | Age: 26
Discharge: HOME | End: 2021-09-30
Attending: SURGERY
Payer: MEDICARE

## 2021-09-30 ENCOUNTER — OFFICE VISIT (OUTPATIENT)
Dept: VASCULAR SURGERY | Facility: CLINIC | Age: 26
End: 2021-09-30
Attending: SURGERY
Payer: MEDICARE

## 2021-09-30 VITALS
BODY MASS INDEX: 24.1 KG/M2 | SYSTOLIC BLOOD PRESSURE: 128 MMHG | HEIGHT: 64 IN | TEMPERATURE: 98 F | HEART RATE: 89 BPM | DIASTOLIC BLOOD PRESSURE: 77 MMHG | WEIGHT: 141.13 LBS

## 2021-09-30 DIAGNOSIS — Z99.2 END STAGE RENAL FAILURE ON DIALYSIS: ICD-10-CM

## 2021-09-30 DIAGNOSIS — N18.6 END STAGE RENAL FAILURE ON DIALYSIS: ICD-10-CM

## 2021-09-30 DIAGNOSIS — Z01.818 OTHER SPECIFIED PRE-OPERATIVE EXAMINATION: ICD-10-CM

## 2021-09-30 DIAGNOSIS — I77.0 AVF (ARTERIOVENOUS FISTULA): Primary | ICD-10-CM

## 2021-09-30 DIAGNOSIS — T82.858D ARTERIOVENOUS FISTULA STENOSIS, SUBSEQUENT ENCOUNTER: ICD-10-CM

## 2021-09-30 PROCEDURE — 99215 OFFICE O/P EST HI 40 MIN: CPT | Mod: PBBFAC,NTX | Performed by: SURGERY

## 2021-09-30 PROCEDURE — 99999 PR PBB SHADOW E&M-EST. PATIENT-LVL V: ICD-10-PCS | Mod: PBBFAC,TXP,, | Performed by: SURGERY

## 2021-09-30 PROCEDURE — 99999 PR PBB SHADOW E&M-EST. PATIENT-LVL V: CPT | Mod: PBBFAC,TXP,, | Performed by: SURGERY

## 2021-09-30 PROCEDURE — 99214 OFFICE O/P EST MOD 30 MIN: CPT | Mod: S$PBB,NTX,, | Performed by: SURGERY

## 2021-09-30 PROCEDURE — 99214 PR OFFICE/OUTPT VISIT, EST, LEVL IV, 30-39 MIN: ICD-10-PCS | Mod: S$PBB,NTX,, | Performed by: SURGERY

## 2021-10-01 ENCOUNTER — PATIENT MESSAGE (OUTPATIENT)
Dept: MEDSURG UNIT | Facility: HOSPITAL | Age: 26
End: 2021-10-01

## 2021-10-01 ENCOUNTER — OFFICE VISIT (OUTPATIENT)
Dept: GASTROENTEROLOGY | Facility: CLINIC | Age: 26
End: 2021-10-01
Payer: MEDICAID

## 2021-10-01 ENCOUNTER — PATIENT MESSAGE (OUTPATIENT)
Dept: GASTROENTEROLOGY | Facility: CLINIC | Age: 26
End: 2021-10-01

## 2021-10-01 VITALS — WEIGHT: 140 LBS | BODY MASS INDEX: 23.9 KG/M2 | HEIGHT: 64 IN

## 2021-10-01 DIAGNOSIS — Z99.2 TYPE 1 DIABETES MELLITUS WITH CHRONIC KIDNEY DISEASE ON CHRONIC DIALYSIS: ICD-10-CM

## 2021-10-01 DIAGNOSIS — Z87.898 HISTORY OF NAUSEA AND VOMITING: ICD-10-CM

## 2021-10-01 DIAGNOSIS — Z86.2 HISTORY OF IRON DEFICIENCY ANEMIA: ICD-10-CM

## 2021-10-01 DIAGNOSIS — K52.9 CHRONIC DIARRHEA: ICD-10-CM

## 2021-10-01 DIAGNOSIS — N18.6 TYPE 1 DIABETES MELLITUS WITH CHRONIC KIDNEY DISEASE ON CHRONIC DIALYSIS: ICD-10-CM

## 2021-10-01 DIAGNOSIS — I27.20 PULMONARY HTN: ICD-10-CM

## 2021-10-01 DIAGNOSIS — E10.22 TYPE 1 DIABETES MELLITUS WITH CHRONIC KIDNEY DISEASE ON CHRONIC DIALYSIS: ICD-10-CM

## 2021-10-01 DIAGNOSIS — N18.9 CHRONIC RENAL IMPAIRMENT, UNSPECIFIED CKD STAGE: Primary | ICD-10-CM

## 2021-10-01 PROCEDURE — 99214 OFFICE O/P EST MOD 30 MIN: CPT | Mod: 95,TXP,, | Performed by: INTERNAL MEDICINE

## 2021-10-01 PROCEDURE — 99214 PR OFFICE/OUTPT VISIT, EST, LEVL IV, 30-39 MIN: ICD-10-PCS | Mod: 95,TXP,, | Performed by: INTERNAL MEDICINE

## 2021-10-05 DIAGNOSIS — E10.22 TYPE 1 DIABETES MELLITUS WITH STAGE 5 CHRONIC KIDNEY DISEASE NOT ON CHRONIC DIALYSIS: Primary | ICD-10-CM

## 2021-10-05 DIAGNOSIS — N18.5 TYPE 1 DIABETES MELLITUS WITH STAGE 5 CHRONIC KIDNEY DISEASE NOT ON CHRONIC DIALYSIS: Primary | ICD-10-CM

## 2021-10-05 RX ORDER — FLASH GLUCOSE SENSOR
6 KIT MISCELLANEOUS DAILY
Qty: 6 KIT | Refills: 3 | Status: SHIPPED | OUTPATIENT
Start: 2021-10-05 | End: 2022-02-28 | Stop reason: SDUPTHER

## 2021-10-05 RX ORDER — FLASH GLUCOSE SCANNING READER
1 EACH MISCELLANEOUS DAILY
Qty: 1 EACH | Refills: 3 | Status: SHIPPED | OUTPATIENT
Start: 2021-10-05 | End: 2022-03-07 | Stop reason: SDUPTHER

## 2021-10-07 ENCOUNTER — TELEPHONE (OUTPATIENT)
Dept: TRANSPLANT | Facility: CLINIC | Age: 26
End: 2021-10-07

## 2021-10-07 ENCOUNTER — PATIENT MESSAGE (OUTPATIENT)
Dept: NEPHROLOGY | Facility: CLINIC | Age: 26
End: 2021-10-07

## 2021-10-07 ENCOUNTER — TELEPHONE (OUTPATIENT)
Dept: NEPHROLOGY | Facility: CLINIC | Age: 26
End: 2021-10-07

## 2021-10-11 ENCOUNTER — LAB VISIT (OUTPATIENT)
Dept: LAB | Facility: HOSPITAL | Age: 26
End: 2021-10-11
Attending: INTERNAL MEDICINE
Payer: MEDICARE

## 2021-10-11 DIAGNOSIS — E78.00 HYPERCHOLESTEREMIA: ICD-10-CM

## 2021-10-11 DIAGNOSIS — E83.19 IRON OVERLOAD: ICD-10-CM

## 2021-10-11 LAB
ALBUMIN SERPL BCP-MCNC: 3.1 G/DL (ref 3.5–5.2)
ALP SERPL-CCNC: 74 U/L (ref 55–135)
ALT SERPL W/O P-5'-P-CCNC: 22 U/L (ref 10–44)
AST SERPL-CCNC: 16 U/L (ref 10–40)
BILIRUB DIRECT SERPL-MCNC: 0.2 MG/DL (ref 0.1–0.3)
BILIRUB SERPL-MCNC: 0.6 MG/DL (ref 0.1–1)
CHOLEST SERPL-MCNC: 135 MG/DL (ref 120–199)
CHOLEST/HDLC SERPL: 1.9 {RATIO} (ref 2–5)
FERRITIN SERPL-MCNC: 220 NG/ML (ref 20–300)
HDLC SERPL-MCNC: 71 MG/DL (ref 40–75)
HDLC SERPL: 52.6 % (ref 20–50)
IRON SERPL-MCNC: 43 UG/DL (ref 30–160)
LDLC SERPL CALC-MCNC: 56.8 MG/DL (ref 63–159)
NONHDLC SERPL-MCNC: 64 MG/DL
PROT SERPL-MCNC: 6.7 G/DL (ref 6–8.4)
SATURATED IRON: 17 % (ref 20–50)
TOTAL IRON BINDING CAPACITY: 252 UG/DL (ref 250–450)
TRANSFERRIN SERPL-MCNC: 170 MG/DL (ref 200–375)
TRIGL SERPL-MCNC: 36 MG/DL (ref 30–150)

## 2021-10-11 PROCEDURE — 81256 HFE GENE: CPT | Mod: TXP | Performed by: INTERNAL MEDICINE

## 2021-10-11 PROCEDURE — 82525 ASSAY OF COPPER: CPT | Mod: TXP | Performed by: INTERNAL MEDICINE

## 2021-10-11 PROCEDURE — 82728 ASSAY OF FERRITIN: CPT | Mod: NTX | Performed by: INTERNAL MEDICINE

## 2021-10-11 PROCEDURE — 36415 COLL VENOUS BLD VENIPUNCTURE: CPT | Mod: TXP | Performed by: INTERNAL MEDICINE

## 2021-10-11 PROCEDURE — 84466 ASSAY OF TRANSFERRIN: CPT | Mod: TXP | Performed by: INTERNAL MEDICINE

## 2021-10-11 PROCEDURE — 80061 LIPID PANEL: CPT | Mod: NTX | Performed by: INTERNAL MEDICINE

## 2021-10-11 PROCEDURE — 80076 HEPATIC FUNCTION PANEL: CPT | Mod: NTX | Performed by: INTERNAL MEDICINE

## 2021-10-13 ENCOUNTER — TELEPHONE (OUTPATIENT)
Dept: CARDIOLOGY | Facility: CLINIC | Age: 26
End: 2021-10-13

## 2021-10-14 LAB — COPPER SERPL-MCNC: 1138 UG/L (ref 810–1990)

## 2021-10-15 ENCOUNTER — OFFICE VISIT (OUTPATIENT)
Dept: PULMONOLOGY | Facility: CLINIC | Age: 26
End: 2021-10-15
Payer: MEDICARE

## 2021-10-15 VITALS
WEIGHT: 146.63 LBS | BODY MASS INDEX: 25.03 KG/M2 | HEART RATE: 92 BPM | OXYGEN SATURATION: 100 % | DIASTOLIC BLOOD PRESSURE: 80 MMHG | HEIGHT: 64 IN | SYSTOLIC BLOOD PRESSURE: 140 MMHG

## 2021-10-15 DIAGNOSIS — I42.9 CARDIOMYOPATHY, UNSPECIFIED TYPE: ICD-10-CM

## 2021-10-15 DIAGNOSIS — J98.4 RESTRICTIVE LUNG DISEASE: ICD-10-CM

## 2021-10-15 DIAGNOSIS — R06.02 SOB (SHORTNESS OF BREATH): Primary | ICD-10-CM

## 2021-10-15 LAB
GENETICIST REVIEW: NORMAL
HFE GENE MUT ANL BLD/T: NORMAL
HFE RELEASED BY: NORMAL
HFE RESULT SUMMARY: NEGATIVE
REF LAB TEST METHOD: NORMAL
SPECIMEN SOURCE: NORMAL
SPECIMEN,  HEMOCHROMATOSIS: NORMAL

## 2021-10-18 ENCOUNTER — PATIENT MESSAGE (OUTPATIENT)
Dept: ADMINISTRATIVE | Facility: HOSPITAL | Age: 26
End: 2021-10-18
Payer: MEDICAID

## 2021-10-19 ENCOUNTER — LAB VISIT (OUTPATIENT)
Dept: SPORTS MEDICINE | Facility: CLINIC | Age: 26
End: 2021-10-19
Attending: SURGERY
Payer: MEDICARE

## 2021-10-19 ENCOUNTER — OFFICE VISIT (OUTPATIENT)
Dept: PSYCHIATRY | Facility: CLINIC | Age: 26
End: 2021-10-19
Payer: MEDICARE

## 2021-10-19 VITALS
WEIGHT: 141 LBS | HEART RATE: 93 BPM | HEIGHT: 64 IN | DIASTOLIC BLOOD PRESSURE: 75 MMHG | SYSTOLIC BLOOD PRESSURE: 129 MMHG | BODY MASS INDEX: 24.07 KG/M2

## 2021-10-19 DIAGNOSIS — F40.298 SPECIFIC PHOBIA: ICD-10-CM

## 2021-10-19 DIAGNOSIS — Z01.818 OTHER SPECIFIED PRE-OPERATIVE EXAMINATION: ICD-10-CM

## 2021-10-19 DIAGNOSIS — F33.41 MDD (MAJOR DEPRESSIVE DISORDER), RECURRENT, IN PARTIAL REMISSION: Primary | ICD-10-CM

## 2021-10-19 LAB
SARS-COV-2 RNA RESP QL NAA+PROBE: NOT DETECTED
SARS-COV-2- CYCLE NUMBER: NORMAL

## 2021-10-19 PROCEDURE — 99999 PR PBB SHADOW E&M-EST. PATIENT-LVL II: ICD-10-PCS | Mod: PBBFAC,TXP,, | Performed by: PSYCHIATRY & NEUROLOGY

## 2021-10-19 PROCEDURE — 4010F PR ACE/ARB THEARPY RXD/TAKEN: ICD-10-PCS | Mod: CPTII,NTX,S$GLB, | Performed by: PSYCHIATRY & NEUROLOGY

## 2021-10-19 PROCEDURE — 3066F PR DOCUMENTATION OF TREATMENT FOR NEPHROPATHY: ICD-10-PCS | Mod: CPTII,NTX,S$GLB, | Performed by: PSYCHIATRY & NEUROLOGY

## 2021-10-19 PROCEDURE — 3078F PR MOST RECENT DIASTOLIC BLOOD PRESSURE < 80 MM HG: ICD-10-PCS | Mod: CPTII,NTX,S$GLB, | Performed by: PSYCHIATRY & NEUROLOGY

## 2021-10-19 PROCEDURE — 90792 PR PSYCHIATRIC DIAGNOSTIC EVALUATION W/MEDICAL SERVICES: ICD-10-PCS | Mod: NTX,S$GLB,, | Performed by: PSYCHIATRY & NEUROLOGY

## 2021-10-19 PROCEDURE — 3046F HEMOGLOBIN A1C LEVEL >9.0%: CPT | Mod: CPTII,NTX,S$GLB, | Performed by: PSYCHIATRY & NEUROLOGY

## 2021-10-19 PROCEDURE — 3074F SYST BP LT 130 MM HG: CPT | Mod: CPTII,NTX,S$GLB, | Performed by: PSYCHIATRY & NEUROLOGY

## 2021-10-19 PROCEDURE — 4010F ACE/ARB THERAPY RXD/TAKEN: CPT | Mod: CPTII,NTX,S$GLB, | Performed by: PSYCHIATRY & NEUROLOGY

## 2021-10-19 PROCEDURE — 3008F PR BODY MASS INDEX (BMI) DOCUMENTED: ICD-10-PCS | Mod: CPTII,NTX,S$GLB, | Performed by: PSYCHIATRY & NEUROLOGY

## 2021-10-19 PROCEDURE — 3008F BODY MASS INDEX DOCD: CPT | Mod: CPTII,NTX,S$GLB, | Performed by: PSYCHIATRY & NEUROLOGY

## 2021-10-19 PROCEDURE — 90792 PSYCH DIAG EVAL W/MED SRVCS: CPT | Mod: NTX,S$GLB,, | Performed by: PSYCHIATRY & NEUROLOGY

## 2021-10-19 PROCEDURE — 3074F PR MOST RECENT SYSTOLIC BLOOD PRESSURE < 130 MM HG: ICD-10-PCS | Mod: CPTII,NTX,S$GLB, | Performed by: PSYCHIATRY & NEUROLOGY

## 2021-10-19 PROCEDURE — 3046F PR MOST RECENT HEMOGLOBIN A1C LEVEL > 9.0%: ICD-10-PCS | Mod: CPTII,NTX,S$GLB, | Performed by: PSYCHIATRY & NEUROLOGY

## 2021-10-19 PROCEDURE — U0005 INFEC AGEN DETEC AMPLI PROBE: HCPCS | Mod: NTX | Performed by: SURGERY

## 2021-10-19 PROCEDURE — U0003 INFECTIOUS AGENT DETECTION BY NUCLEIC ACID (DNA OR RNA); SEVERE ACUTE RESPIRATORY SYNDROME CORONAVIRUS 2 (SARS-COV-2) (CORONAVIRUS DISEASE [COVID-19]), AMPLIFIED PROBE TECHNIQUE, MAKING USE OF HIGH THROUGHPUT TECHNOLOGIES AS DESCRIBED BY CMS-2020-01-R: HCPCS | Mod: TXP | Performed by: SURGERY

## 2021-10-19 PROCEDURE — 3066F NEPHROPATHY DOC TX: CPT | Mod: CPTII,NTX,S$GLB, | Performed by: PSYCHIATRY & NEUROLOGY

## 2021-10-19 PROCEDURE — 99999 PR PBB SHADOW E&M-EST. PATIENT-LVL II: CPT | Mod: PBBFAC,TXP,, | Performed by: PSYCHIATRY & NEUROLOGY

## 2021-10-19 PROCEDURE — 3078F DIAST BP <80 MM HG: CPT | Mod: CPTII,NTX,S$GLB, | Performed by: PSYCHIATRY & NEUROLOGY

## 2021-10-21 ENCOUNTER — TELEPHONE (OUTPATIENT)
Dept: VASCULAR SURGERY | Facility: CLINIC | Age: 26
End: 2021-10-21

## 2021-10-22 ENCOUNTER — HOSPITAL ENCOUNTER (INPATIENT)
Facility: HOSPITAL | Age: 26
LOS: 2 days | Discharge: HOME | DRG: 637 | End: 2021-10-24
Attending: EMERGENCY MEDICINE | Admitting: HOSPITALIST
Payer: MEDICARE

## 2021-10-22 DIAGNOSIS — R06.02 SHORTNESS OF BREATH: ICD-10-CM

## 2021-10-22 DIAGNOSIS — D63.1 ANEMIA DUE TO STAGE 3 CHRONIC KIDNEY DISEASE, UNSPECIFIED WHETHER STAGE 3A OR 3B CKD: ICD-10-CM

## 2021-10-22 DIAGNOSIS — N18.30 ANEMIA DUE TO STAGE 3 CHRONIC KIDNEY DISEASE, UNSPECIFIED WHETHER STAGE 3A OR 3B CKD: ICD-10-CM

## 2021-10-22 DIAGNOSIS — Z99.2 ESRD (END STAGE RENAL DISEASE) ON DIALYSIS: ICD-10-CM

## 2021-10-22 DIAGNOSIS — R07.9 CHEST PAIN: ICD-10-CM

## 2021-10-22 DIAGNOSIS — N18.6 TYPE 1 DIABETES MELLITUS WITH END-STAGE RENAL DISEASE (ESRD): ICD-10-CM

## 2021-10-22 DIAGNOSIS — E10.22 TYPE 1 DIABETES MELLITUS WITH END-STAGE RENAL DISEASE (ESRD): ICD-10-CM

## 2021-10-22 DIAGNOSIS — J96.01 ACUTE HYPOXEMIC RESPIRATORY FAILURE: ICD-10-CM

## 2021-10-22 DIAGNOSIS — J81.0 ACUTE PULMONARY EDEMA: Primary | ICD-10-CM

## 2021-10-22 DIAGNOSIS — N18.6 ESRD (END STAGE RENAL DISEASE) ON DIALYSIS: ICD-10-CM

## 2021-10-22 DIAGNOSIS — E10.10 TYPE 1 DIABETES MELLITUS WITH KETOACIDOSIS WITHOUT COMA: ICD-10-CM

## 2021-10-22 PROBLEM — R19.7 DIARRHEA IN ADULT PATIENT: Status: RESOLVED | Noted: 2021-03-05 | Resolved: 2021-10-22

## 2021-10-22 PROBLEM — E87.5 HYPERKALEMIA: Status: RESOLVED | Noted: 2020-11-07 | Resolved: 2021-10-22

## 2021-10-22 PROBLEM — I16.1 HYPERTENSIVE EMERGENCY: Status: RESOLVED | Noted: 2021-03-04 | Resolved: 2021-10-22

## 2021-10-22 LAB
ALBUMIN SERPL BCP-MCNC: 3 G/DL (ref 3.5–5.2)
ALLENS TEST: ABNORMAL
ALP SERPL-CCNC: 78 U/L (ref 55–135)
ALT SERPL W/O P-5'-P-CCNC: 17 U/L (ref 10–44)
ANION GAP SERPL CALC-SCNC: 11 MMOL/L (ref 8–16)
AST SERPL-CCNC: 15 U/L (ref 10–40)
BASOPHILS # BLD AUTO: 0.04 K/UL (ref 0–0.2)
BASOPHILS NFR BLD: 0.3 % (ref 0–1.9)
BILIRUB SERPL-MCNC: 0.5 MG/DL (ref 0.1–1)
BNP SERPL-MCNC: 1832 PG/ML (ref 0–99)
BUN SERPL-MCNC: 29 MG/DL (ref 6–20)
CALCIUM SERPL-MCNC: 8.5 MG/DL (ref 8.7–10.5)
CHLORIDE SERPL-SCNC: 94 MMOL/L (ref 95–110)
CO2 SERPL-SCNC: 31 MMOL/L (ref 23–29)
CREAT SERPL-MCNC: 4.7 MG/DL (ref 0.5–1.4)
CTP QC/QA: YES
DELSYS: ABNORMAL
DIFFERENTIAL METHOD: ABNORMAL
EOSINOPHIL # BLD AUTO: 0 K/UL (ref 0–0.5)
EOSINOPHIL NFR BLD: 0.1 % (ref 0–8)
ERYTHROCYTE [DISTWIDTH] IN BLOOD BY AUTOMATED COUNT: 18.3 % (ref 11.5–14.5)
ERYTHROCYTE [SEDIMENTATION RATE] IN BLOOD BY WESTERGREN METHOD: 19 MM/H
EST. GFR  (AFRICAN AMERICAN): 13.8 ML/MIN/1.73 M^2
EST. GFR  (NON AFRICAN AMERICAN): 12 ML/MIN/1.73 M^2
FIO2: 28
FLOW: 2
GLUCOSE SERPL-MCNC: 266 MG/DL (ref 70–110)
HCO3 UR-SCNC: 35.4 MMOL/L (ref 24–28)
HCT VFR BLD AUTO: 27.9 % (ref 37–48.5)
HGB BLD-MCNC: 9 G/DL (ref 12–16)
IMM GRANULOCYTES # BLD AUTO: 0.05 K/UL (ref 0–0.04)
IMM GRANULOCYTES NFR BLD AUTO: 0.4 % (ref 0–0.5)
LACTATE SERPL-SCNC: 0.8 MMOL/L (ref 0.5–2.2)
LACTATE SERPL-SCNC: 1.3 MMOL/L (ref 0.5–2.2)
LYMPHOCYTES # BLD AUTO: 1.2 K/UL (ref 1–4.8)
LYMPHOCYTES NFR BLD: 8.1 % (ref 18–48)
MCH RBC QN AUTO: 28.8 PG (ref 27–31)
MCHC RBC AUTO-ENTMCNC: 32.3 G/DL (ref 32–36)
MCV RBC AUTO: 89 FL (ref 82–98)
MODE: ABNORMAL
MONOCYTES # BLD AUTO: 0.6 K/UL (ref 0.3–1)
MONOCYTES NFR BLD: 4.3 % (ref 4–15)
NEUTROPHILS # BLD AUTO: 12.4 K/UL (ref 1.8–7.7)
NEUTROPHILS NFR BLD: 86.8 % (ref 38–73)
NRBC BLD-RTO: 0 /100 WBC
PCO2 BLDA: 49.6 MMHG (ref 35–45)
PH SMN: 7.46 [PH] (ref 7.35–7.45)
PLATELET # BLD AUTO: 227 K/UL (ref 150–450)
PMV BLD AUTO: 12 FL (ref 9.2–12.9)
PO2 BLDA: 35 MMHG (ref 40–60)
POC BE: 12 MMOL/L
POC SATURATED O2: 69 % (ref 95–100)
POC TCO2: 37 MMOL/L (ref 24–29)
POCT GLUCOSE: 207 MG/DL (ref 70–110)
POCT GLUCOSE: 374 MG/DL (ref 70–110)
POCT GLUCOSE: 486 MG/DL (ref 70–110)
POTASSIUM SERPL-SCNC: 3.7 MMOL/L (ref 3.5–5.1)
PROT SERPL-MCNC: 6.3 G/DL (ref 6–8.4)
RBC # BLD AUTO: 3.13 M/UL (ref 4–5.4)
SAMPLE: ABNORMAL
SARS-COV-2 RDRP RESP QL NAA+PROBE: NEGATIVE
SITE: ABNORMAL
SODIUM SERPL-SCNC: 136 MMOL/L (ref 136–145)
SP02: 98
TROPONIN I SERPL DL<=0.01 NG/ML-MCNC: 0.05 NG/ML (ref 0–0.03)
TROPONIN I SERPL DL<=0.01 NG/ML-MCNC: 0.17 NG/ML (ref 0–0.03)
WBC # BLD AUTO: 14.21 K/UL (ref 3.9–12.7)

## 2021-10-22 PROCEDURE — 99214 OFFICE O/P EST MOD 30 MIN: CPT | Mod: NTX,,, | Performed by: NURSE PRACTITIONER

## 2021-10-22 PROCEDURE — 84484 ASSAY OF TROPONIN QUANT: CPT | Mod: 91,NTX | Performed by: HOSPITALIST

## 2021-10-22 PROCEDURE — 99214 PR OFFICE/OUTPT VISIT, EST, LEVL IV, 30-39 MIN: ICD-10-PCS | Mod: NTX,,, | Performed by: NURSE PRACTITIONER

## 2021-10-22 PROCEDURE — 80100016 HC MAINTENANCE HEMODIALYSIS: Mod: NTX

## 2021-10-22 PROCEDURE — 99220 PR INITIAL OBSERVATION CARE,LEVL III: ICD-10-PCS | Mod: NTX,,, | Performed by: HOSPITALIST

## 2021-10-22 PROCEDURE — 99285 EMERGENCY DEPT VISIT HI MDM: CPT | Mod: CS,,, | Performed by: EMERGENCY MEDICINE

## 2021-10-22 PROCEDURE — 82803 BLOOD GASES ANY COMBINATION: CPT | Mod: NTX

## 2021-10-22 PROCEDURE — 82962 GLUCOSE BLOOD TEST: CPT | Mod: NTX

## 2021-10-22 PROCEDURE — G0378 HOSPITAL OBSERVATION PER HR: HCPCS | Mod: NTX

## 2021-10-22 PROCEDURE — C9399 UNCLASSIFIED DRUGS OR BIOLOG: HCPCS | Mod: NTX | Performed by: HOSPITALIST

## 2021-10-22 PROCEDURE — 99900035 HC TECH TIME PER 15 MIN (STAT): Mod: NTX

## 2021-10-22 PROCEDURE — 36415 COLL VENOUS BLD VENIPUNCTURE: CPT | Mod: NTX | Performed by: HOSPITALIST

## 2021-10-22 PROCEDURE — 63600175 PHARM REV CODE 636 W HCPCS: Mod: NTX | Performed by: HOSPITALIST

## 2021-10-22 PROCEDURE — 63600175 PHARM REV CODE 636 W HCPCS: Mod: NTX

## 2021-10-22 PROCEDURE — 84484 ASSAY OF TROPONIN QUANT: CPT | Mod: NTX | Performed by: STUDENT IN AN ORGANIZED HEALTH CARE EDUCATION/TRAINING PROGRAM

## 2021-10-22 PROCEDURE — 63600175 PHARM REV CODE 636 W HCPCS: Mod: NTX | Performed by: STUDENT IN AN ORGANIZED HEALTH CARE EDUCATION/TRAINING PROGRAM

## 2021-10-22 PROCEDURE — 99285 PR EMERGENCY DEPT VISIT,LEVEL V: ICD-10-PCS | Mod: CS,,, | Performed by: EMERGENCY MEDICINE

## 2021-10-22 PROCEDURE — 96365 THER/PROPH/DIAG IV INF INIT: CPT | Mod: NTX

## 2021-10-22 PROCEDURE — G0257 UNSCHED DIALYSIS ESRD PT HOS: HCPCS | Mod: NTX

## 2021-10-22 PROCEDURE — 99285 EMERGENCY DEPT VISIT HI MDM: CPT | Mod: 25,NTX

## 2021-10-22 PROCEDURE — 25000003 PHARM REV CODE 250: Mod: NTX | Performed by: HOSPITALIST

## 2021-10-22 PROCEDURE — 99220 PR INITIAL OBSERVATION CARE,LEVL III: CPT | Mod: NTX,,, | Performed by: HOSPITALIST

## 2021-10-22 PROCEDURE — 96374 THER/PROPH/DIAG INJ IV PUSH: CPT | Mod: 59,NTX

## 2021-10-22 PROCEDURE — 96372 THER/PROPH/DIAG INJ SC/IM: CPT | Mod: 59 | Performed by: EMERGENCY MEDICINE

## 2021-10-22 PROCEDURE — 83880 ASSAY OF NATRIURETIC PEPTIDE: CPT | Mod: NTX | Performed by: STUDENT IN AN ORGANIZED HEALTH CARE EDUCATION/TRAINING PROGRAM

## 2021-10-22 PROCEDURE — 96375 TX/PRO/DX INJ NEW DRUG ADDON: CPT | Mod: 59,NTX

## 2021-10-22 PROCEDURE — 63600175 PHARM REV CODE 636 W HCPCS: Mod: NTX | Performed by: NURSE PRACTITIONER

## 2021-10-22 PROCEDURE — 87040 BLOOD CULTURE FOR BACTERIA: CPT | Mod: 59,NTX | Performed by: STUDENT IN AN ORGANIZED HEALTH CARE EDUCATION/TRAINING PROGRAM

## 2021-10-22 PROCEDURE — U0002 COVID-19 LAB TEST NON-CDC: HCPCS | Mod: NTX | Performed by: STUDENT IN AN ORGANIZED HEALTH CARE EDUCATION/TRAINING PROGRAM

## 2021-10-22 PROCEDURE — 85025 COMPLETE CBC W/AUTO DIFF WBC: CPT | Mod: 91,NTX | Performed by: STUDENT IN AN ORGANIZED HEALTH CARE EDUCATION/TRAINING PROGRAM

## 2021-10-22 PROCEDURE — 25000003 PHARM REV CODE 250: Mod: NTX | Performed by: STUDENT IN AN ORGANIZED HEALTH CARE EDUCATION/TRAINING PROGRAM

## 2021-10-22 PROCEDURE — 83605 ASSAY OF LACTIC ACID: CPT | Mod: 91,NTX | Performed by: STUDENT IN AN ORGANIZED HEALTH CARE EDUCATION/TRAINING PROGRAM

## 2021-10-22 PROCEDURE — 80053 COMPREHEN METABOLIC PANEL: CPT | Mod: NTX | Performed by: EMERGENCY MEDICINE

## 2021-10-22 RX ORDER — ONDANSETRON 2 MG/ML
INJECTION INTRAMUSCULAR; INTRAVENOUS
Status: COMPLETED
Start: 2021-10-22 | End: 2021-10-22

## 2021-10-22 RX ORDER — IBUPROFEN 200 MG
16 TABLET ORAL
Status: DISCONTINUED | OUTPATIENT
Start: 2021-10-22 | End: 2021-10-23

## 2021-10-22 RX ORDER — CARVEDILOL 25 MG/1
25 TABLET ORAL 2 TIMES DAILY
Status: DISCONTINUED | OUTPATIENT
Start: 2021-10-22 | End: 2021-10-24 | Stop reason: HOSPADM

## 2021-10-22 RX ORDER — NIFEDIPINE 30 MG/1
60 TABLET, EXTENDED RELEASE ORAL 2 TIMES DAILY
Status: DISCONTINUED | OUTPATIENT
Start: 2021-10-22 | End: 2021-10-24 | Stop reason: HOSPADM

## 2021-10-22 RX ORDER — SEVELAMER CARBONATE 800 MG/1
1600 TABLET, FILM COATED ORAL
Status: DISCONTINUED | OUTPATIENT
Start: 2021-10-22 | End: 2021-10-24 | Stop reason: HOSPADM

## 2021-10-22 RX ORDER — TALC
6 POWDER (GRAM) TOPICAL NIGHTLY PRN
Status: DISCONTINUED | OUTPATIENT
Start: 2021-10-22 | End: 2021-10-24 | Stop reason: HOSPADM

## 2021-10-22 RX ORDER — FUROSEMIDE 10 MG/ML
80 INJECTION INTRAMUSCULAR; INTRAVENOUS EVERY 6 HOURS
Status: CANCELLED | OUTPATIENT
Start: 2021-10-22

## 2021-10-22 RX ORDER — INSULIN ASPART 100 [IU]/ML
5 INJECTION, SOLUTION INTRAVENOUS; SUBCUTANEOUS
Status: DISCONTINUED | OUTPATIENT
Start: 2021-10-23 | End: 2021-10-23

## 2021-10-22 RX ORDER — FUROSEMIDE 10 MG/ML
80 INJECTION INTRAMUSCULAR; INTRAVENOUS
Status: COMPLETED | OUTPATIENT
Start: 2021-10-22 | End: 2021-10-22

## 2021-10-22 RX ORDER — ASPIRIN 81 MG/1
81 TABLET ORAL NIGHTLY
Status: DISCONTINUED | OUTPATIENT
Start: 2021-10-22 | End: 2021-10-24 | Stop reason: HOSPADM

## 2021-10-22 RX ORDER — PROMETHAZINE HYDROCHLORIDE 25 MG/1
25 TABLET ORAL EVERY 6 HOURS PRN
Status: DISCONTINUED | OUTPATIENT
Start: 2021-10-22 | End: 2021-10-22

## 2021-10-22 RX ORDER — AZITHROMYCIN 250 MG/1
250 TABLET, FILM COATED ORAL DAILY
Status: DISCONTINUED | OUTPATIENT
Start: 2021-10-23 | End: 2021-10-24 | Stop reason: HOSPADM

## 2021-10-22 RX ORDER — HEPARIN SODIUM 1000 [USP'U]/ML
1000 INJECTION, SOLUTION INTRAVENOUS; SUBCUTANEOUS ONCE
Status: COMPLETED | OUTPATIENT
Start: 2021-10-22 | End: 2021-10-22

## 2021-10-22 RX ORDER — TRAZODONE HYDROCHLORIDE 50 MG/1
50 TABLET ORAL NIGHTLY PRN
Status: DISCONTINUED | OUTPATIENT
Start: 2021-10-22 | End: 2021-10-22

## 2021-10-22 RX ORDER — SODIUM CHLORIDE 0.9 % (FLUSH) 0.9 %
10 SYRINGE (ML) INJECTION
Status: DISCONTINUED | OUTPATIENT
Start: 2021-10-22 | End: 2021-10-24 | Stop reason: HOSPADM

## 2021-10-22 RX ORDER — HYDRALAZINE HYDROCHLORIDE 25 MG/1
25 TABLET, FILM COATED ORAL 2 TIMES DAILY
Status: DISCONTINUED | OUTPATIENT
Start: 2021-10-22 | End: 2021-10-24

## 2021-10-22 RX ORDER — ACETAMINOPHEN 325 MG/1
650 TABLET ORAL EVERY 8 HOURS PRN
Status: DISCONTINUED | OUTPATIENT
Start: 2021-10-22 | End: 2021-10-24 | Stop reason: HOSPADM

## 2021-10-22 RX ORDER — GLUCAGON 1 MG
1 KIT INJECTION
Status: DISCONTINUED | OUTPATIENT
Start: 2021-10-22 | End: 2021-10-23

## 2021-10-22 RX ORDER — CALCITRIOL 0.5 UG/1
0.5 CAPSULE ORAL DAILY
Status: DISCONTINUED | OUTPATIENT
Start: 2021-10-23 | End: 2021-10-24 | Stop reason: HOSPADM

## 2021-10-22 RX ORDER — LANOLIN ALCOHOL/MO/W.PET/CERES
1 CREAM (GRAM) TOPICAL DAILY
Status: DISCONTINUED | OUTPATIENT
Start: 2021-10-23 | End: 2021-10-24 | Stop reason: HOSPADM

## 2021-10-22 RX ORDER — CEFTRIAXONE 1 G/1
1 INJECTION, POWDER, FOR SOLUTION INTRAMUSCULAR; INTRAVENOUS
Status: DISCONTINUED | OUTPATIENT
Start: 2021-10-23 | End: 2021-10-24 | Stop reason: HOSPADM

## 2021-10-22 RX ORDER — HEPARIN SODIUM 5000 [USP'U]/ML
5000 INJECTION, SOLUTION INTRAVENOUS; SUBCUTANEOUS EVERY 8 HOURS
Status: DISCONTINUED | OUTPATIENT
Start: 2021-10-22 | End: 2021-10-24 | Stop reason: HOSPADM

## 2021-10-22 RX ORDER — INSULIN ASPART 100 [IU]/ML
0-5 INJECTION, SOLUTION INTRAVENOUS; SUBCUTANEOUS
Status: DISCONTINUED | OUTPATIENT
Start: 2021-10-22 | End: 2021-10-23

## 2021-10-22 RX ORDER — ATORVASTATIN CALCIUM 20 MG/1
40 TABLET, FILM COATED ORAL DAILY
Status: DISCONTINUED | OUTPATIENT
Start: 2021-10-23 | End: 2021-10-24 | Stop reason: HOSPADM

## 2021-10-22 RX ORDER — ONDANSETRON 2 MG/ML
4 INJECTION INTRAMUSCULAR; INTRAVENOUS EVERY 8 HOURS PRN
Status: DISCONTINUED | OUTPATIENT
Start: 2021-10-22 | End: 2021-10-22

## 2021-10-22 RX ORDER — ESCITALOPRAM OXALATE 10 MG/1
20 TABLET ORAL DAILY
Status: DISCONTINUED | OUTPATIENT
Start: 2021-10-23 | End: 2021-10-24 | Stop reason: HOSPADM

## 2021-10-22 RX ORDER — IBUPROFEN 200 MG
24 TABLET ORAL
Status: DISCONTINUED | OUTPATIENT
Start: 2021-10-22 | End: 2021-10-23

## 2021-10-22 RX ADMIN — Medication 6 MG: at 09:10

## 2021-10-22 RX ADMIN — HYDRALAZINE HYDROCHLORIDE 25 MG: 25 TABLET, FILM COATED ORAL at 09:10

## 2021-10-22 RX ADMIN — AZITHROMYCIN MONOHYDRATE 500 MG: 500 INJECTION, POWDER, LYOPHILIZED, FOR SOLUTION INTRAVENOUS at 10:10

## 2021-10-22 RX ADMIN — HEPARIN SODIUM 5000 UNITS: 5000 INJECTION INTRAVENOUS; SUBCUTANEOUS at 09:10

## 2021-10-22 RX ADMIN — CEFTRIAXONE 2 G: 2 INJECTION, SOLUTION INTRAVENOUS at 10:10

## 2021-10-22 RX ADMIN — INSULIN DETEMIR 8 UNITS: 100 INJECTION, SOLUTION SUBCUTANEOUS at 09:10

## 2021-10-22 RX ADMIN — HEPARIN SODIUM 1000 UNITS: 1000 INJECTION, SOLUTION INTRAVENOUS; SUBCUTANEOUS at 04:10

## 2021-10-22 RX ADMIN — FUROSEMIDE 80 MG: 10 INJECTION, SOLUTION INTRAMUSCULAR; INTRAVENOUS at 10:10

## 2021-10-22 RX ADMIN — NIFEDIPINE 60 MG: 30 TABLET, FILM COATED, EXTENDED RELEASE ORAL at 09:10

## 2021-10-22 RX ADMIN — ASPIRIN 81 MG: 81 TABLET, COATED ORAL at 09:10

## 2021-10-22 RX ADMIN — INSULIN ASPART 3 UNITS: 100 INJECTION, SOLUTION INTRAVENOUS; SUBCUTANEOUS at 09:10

## 2021-10-22 RX ADMIN — ONDANSETRON 4 MG: 2 INJECTION INTRAMUSCULAR; INTRAVENOUS at 10:10

## 2021-10-22 RX ADMIN — CARVEDILOL 25 MG: 25 TABLET, FILM COATED ORAL at 09:10

## 2021-10-22 RX ADMIN — SEVELAMER CARBONATE 1600 MG: 800 TABLET, FILM COATED ORAL at 06:10

## 2021-10-23 LAB
ALBUMIN SERPL BCP-MCNC: 2.8 G/DL (ref 3.5–5.2)
ALP SERPL-CCNC: 77 U/L (ref 55–135)
ALT SERPL W/O P-5'-P-CCNC: 12 U/L (ref 10–44)
ANION GAP SERPL CALC-SCNC: 14 MMOL/L (ref 8–16)
ANION GAP SERPL CALC-SCNC: 18 MMOL/L (ref 8–16)
AST SERPL-CCNC: 10 U/L (ref 10–40)
B-OH-BUTYR BLD STRIP-SCNC: 1.1 MMOL/L (ref 0–0.5)
BASOPHILS # BLD AUTO: 0.02 K/UL (ref 0–0.2)
BASOPHILS NFR BLD: 0.3 % (ref 0–1.9)
BILIRUB SERPL-MCNC: 0.5 MG/DL (ref 0.1–1)
BUN SERPL-MCNC: 16 MG/DL (ref 6–20)
BUN SERPL-MCNC: 8 MG/DL (ref 6–20)
CALCIUM SERPL-MCNC: 8.5 MG/DL (ref 8.7–10.5)
CALCIUM SERPL-MCNC: 9.2 MG/DL (ref 8.7–10.5)
CHLORIDE SERPL-SCNC: 99 MMOL/L (ref 95–110)
CHLORIDE SERPL-SCNC: 99 MMOL/L (ref 95–110)
CO2 SERPL-SCNC: 19 MMOL/L (ref 23–29)
CO2 SERPL-SCNC: 21 MMOL/L (ref 23–29)
CREAT SERPL-MCNC: 2.2 MG/DL (ref 0.5–1.4)
CREAT SERPL-MCNC: 3.6 MG/DL (ref 0.5–1.4)
DIFFERENTIAL METHOD: ABNORMAL
EOSINOPHIL # BLD AUTO: 0.2 K/UL (ref 0–0.5)
EOSINOPHIL NFR BLD: 2.5 % (ref 0–8)
ERYTHROCYTE [DISTWIDTH] IN BLOOD BY AUTOMATED COUNT: 18.4 % (ref 11.5–14.5)
EST. GFR  (AFRICAN AMERICAN): 19.1 ML/MIN/1.73 M^2
EST. GFR  (AFRICAN AMERICAN): 34.6 ML/MIN/1.73 M^2
EST. GFR  (NON AFRICAN AMERICAN): 16.6 ML/MIN/1.73 M^2
EST. GFR  (NON AFRICAN AMERICAN): 30 ML/MIN/1.73 M^2
GLUCOSE SERPL-MCNC: 170 MG/DL (ref 70–110)
GLUCOSE SERPL-MCNC: 583 MG/DL (ref 70–110)
HCT VFR BLD AUTO: 24.9 % (ref 37–48.5)
HGB BLD-MCNC: 8 G/DL (ref 12–16)
IMM GRANULOCYTES # BLD AUTO: 0.04 K/UL (ref 0–0.04)
IMM GRANULOCYTES NFR BLD AUTO: 0.6 % (ref 0–0.5)
LYMPHOCYTES # BLD AUTO: 1.8 K/UL (ref 1–4.8)
LYMPHOCYTES NFR BLD: 27.9 % (ref 18–48)
MAGNESIUM SERPL-MCNC: 2.2 MG/DL (ref 1.6–2.6)
MCH RBC QN AUTO: 29.1 PG (ref 27–31)
MCHC RBC AUTO-ENTMCNC: 32.1 G/DL (ref 32–36)
MCV RBC AUTO: 91 FL (ref 82–98)
MONOCYTES # BLD AUTO: 0.5 K/UL (ref 0.3–1)
MONOCYTES NFR BLD: 8 % (ref 4–15)
NEUTROPHILS # BLD AUTO: 3.9 K/UL (ref 1.8–7.7)
NEUTROPHILS NFR BLD: 60.7 % (ref 38–73)
NRBC BLD-RTO: 0 /100 WBC
PHOSPHATE SERPL-MCNC: 3.8 MG/DL (ref 2.7–4.5)
PLATELET # BLD AUTO: 174 K/UL (ref 150–450)
PMV BLD AUTO: 11.8 FL (ref 9.2–12.9)
POCT GLUCOSE: 148 MG/DL (ref 70–110)
POCT GLUCOSE: 153 MG/DL (ref 70–110)
POCT GLUCOSE: 174 MG/DL (ref 70–110)
POCT GLUCOSE: 177 MG/DL (ref 70–110)
POCT GLUCOSE: 181 MG/DL (ref 70–110)
POCT GLUCOSE: 187 MG/DL (ref 70–110)
POCT GLUCOSE: 197 MG/DL (ref 70–110)
POCT GLUCOSE: 406 MG/DL (ref 70–110)
POCT GLUCOSE: >500 MG/DL (ref 70–110)
POTASSIUM SERPL-SCNC: 3.7 MMOL/L (ref 3.5–5.1)
POTASSIUM SERPL-SCNC: 4.6 MMOL/L (ref 3.5–5.1)
POTASSIUM SERPL-SCNC: 4.7 MMOL/L (ref 3.5–5.1)
PROCALCITONIN SERPL IA-MCNC: 0.35 NG/ML
PROT SERPL-MCNC: 6 G/DL (ref 6–8.4)
RBC # BLD AUTO: 2.75 M/UL (ref 4–5.4)
SODIUM SERPL-SCNC: 132 MMOL/L (ref 136–145)
SODIUM SERPL-SCNC: 138 MMOL/L (ref 136–145)
TROPONIN I SERPL DL<=0.01 NG/ML-MCNC: 0.08 NG/ML (ref 0–0.03)
WBC # BLD AUTO: 6.49 K/UL (ref 3.9–12.7)

## 2021-10-23 PROCEDURE — 36415 COLL VENOUS BLD VENIPUNCTURE: CPT | Mod: NTX | Performed by: HOSPITALIST

## 2021-10-23 PROCEDURE — 25000003 PHARM REV CODE 250: Mod: NTX | Performed by: HOSPITALIST

## 2021-10-23 PROCEDURE — 90935 HEMODIALYSIS ONE EVALUATION: CPT | Mod: NTX,,, | Performed by: NURSE PRACTITIONER

## 2021-10-23 PROCEDURE — 96372 THER/PROPH/DIAG INJ SC/IM: CPT | Mod: 59 | Performed by: EMERGENCY MEDICINE

## 2021-10-23 PROCEDURE — 90935 PR HEMODIALYSIS, ONE EVALUATION: ICD-10-PCS | Mod: NTX,,, | Performed by: NURSE PRACTITIONER

## 2021-10-23 PROCEDURE — 63600175 PHARM REV CODE 636 W HCPCS: Mod: JG,NTX | Performed by: NURSE PRACTITIONER

## 2021-10-23 PROCEDURE — 63600175 PHARM REV CODE 636 W HCPCS: Mod: NTX | Performed by: HOSPITALIST

## 2021-10-23 PROCEDURE — 84100 ASSAY OF PHOSPHORUS: CPT | Mod: NTX | Performed by: HOSPITALIST

## 2021-10-23 PROCEDURE — 80048 BASIC METABOLIC PNL TOTAL CA: CPT | Mod: NTX | Performed by: NURSE PRACTITIONER

## 2021-10-23 PROCEDURE — 84484 ASSAY OF TROPONIN QUANT: CPT | Mod: NTX | Performed by: NURSE PRACTITIONER

## 2021-10-23 PROCEDURE — 87340 HEPATITIS B SURFACE AG IA: CPT | Mod: NTX | Performed by: EMERGENCY MEDICINE

## 2021-10-23 PROCEDURE — 84132 ASSAY OF SERUM POTASSIUM: CPT | Mod: NTX | Performed by: NURSE PRACTITIONER

## 2021-10-23 PROCEDURE — 83735 ASSAY OF MAGNESIUM: CPT | Mod: NTX | Performed by: HOSPITALIST

## 2021-10-23 PROCEDURE — 90935 HEMODIALYSIS ONE EVALUATION: CPT | Mod: NTX

## 2021-10-23 PROCEDURE — 63600175 PHARM REV CODE 636 W HCPCS: Mod: NTX | Performed by: INTERNAL MEDICINE

## 2021-10-23 PROCEDURE — 83036 HEMOGLOBIN GLYCOSYLATED A1C: CPT | Mod: NTX | Performed by: HOSPITALIST

## 2021-10-23 PROCEDURE — 20600001 HC STEP DOWN PRIVATE ROOM: Mod: NTX

## 2021-10-23 PROCEDURE — 84145 PROCALCITONIN (PCT): CPT | Mod: NTX | Performed by: HOSPITALIST

## 2021-10-23 PROCEDURE — 25000003 PHARM REV CODE 250: Mod: NTX | Performed by: NURSE PRACTITIONER

## 2021-10-23 PROCEDURE — 80053 COMPREHEN METABOLIC PANEL: CPT | Mod: NTX | Performed by: HOSPITALIST

## 2021-10-23 PROCEDURE — 36415 COLL VENOUS BLD VENIPUNCTURE: CPT | Mod: NTX | Performed by: NURSE PRACTITIONER

## 2021-10-23 PROCEDURE — 82010 KETONE BODYS QUAN: CPT | Mod: NTX | Performed by: NURSE PRACTITIONER

## 2021-10-23 PROCEDURE — 99233 SBSQ HOSP IP/OBS HIGH 50: CPT | Mod: NTX,,, | Performed by: INTERNAL MEDICINE

## 2021-10-23 PROCEDURE — 99233 PR SUBSEQUENT HOSPITAL CARE,LEVL III: ICD-10-PCS | Mod: NTX,,, | Performed by: INTERNAL MEDICINE

## 2021-10-23 PROCEDURE — 99223 1ST HOSP IP/OBS HIGH 75: CPT | Mod: NTX,,, | Performed by: NURSE PRACTITIONER

## 2021-10-23 PROCEDURE — 63600175 PHARM REV CODE 636 W HCPCS: Mod: NTX | Performed by: NURSE PRACTITIONER

## 2021-10-23 PROCEDURE — 80100016 HC MAINTENANCE HEMODIALYSIS: Mod: NTX

## 2021-10-23 PROCEDURE — 85025 COMPLETE CBC W/AUTO DIFF WBC: CPT | Mod: NTX | Performed by: HOSPITALIST

## 2021-10-23 PROCEDURE — 96375 TX/PRO/DX INJ NEW DRUG ADDON: CPT | Mod: 59 | Performed by: EMERGENCY MEDICINE

## 2021-10-23 PROCEDURE — 63700000 PHARM REV CODE 250 ALT 637 W/O HCPCS: Mod: NTX | Performed by: HOSPITALIST

## 2021-10-23 PROCEDURE — 99223 PR INITIAL HOSPITAL CARE,LEVL III: ICD-10-PCS | Mod: NTX,,, | Performed by: NURSE PRACTITIONER

## 2021-10-23 RX ORDER — INSULIN ASPART 100 [IU]/ML
8 INJECTION, SOLUTION INTRAVENOUS; SUBCUTANEOUS
Status: DISCONTINUED | OUTPATIENT
Start: 2021-10-23 | End: 2021-10-23

## 2021-10-23 RX ORDER — INSULIN ASPART 100 [IU]/ML
10 INJECTION, SOLUTION INTRAVENOUS; SUBCUTANEOUS
Status: DISCONTINUED | OUTPATIENT
Start: 2021-10-23 | End: 2021-10-23

## 2021-10-23 RX ORDER — GLUCAGON 1 MG
1 KIT INJECTION
Status: DISCONTINUED | OUTPATIENT
Start: 2021-10-23 | End: 2021-10-23

## 2021-10-23 RX ORDER — IBUPROFEN 200 MG
24 TABLET ORAL
Status: DISCONTINUED | OUTPATIENT
Start: 2021-10-23 | End: 2021-10-23

## 2021-10-23 RX ORDER — IBUPROFEN 200 MG
16 TABLET ORAL
Status: DISCONTINUED | OUTPATIENT
Start: 2021-10-23 | End: 2021-10-23

## 2021-10-23 RX ORDER — ONDANSETRON 2 MG/ML
4 INJECTION INTRAMUSCULAR; INTRAVENOUS EVERY 6 HOURS PRN
Status: DISCONTINUED | OUTPATIENT
Start: 2021-10-23 | End: 2021-10-24 | Stop reason: HOSPADM

## 2021-10-23 RX ORDER — SODIUM CHLORIDE 9 MG/ML
INJECTION, SOLUTION INTRAVENOUS ONCE
Status: DISCONTINUED | OUTPATIENT
Start: 2021-10-23 | End: 2021-10-24 | Stop reason: HOSPADM

## 2021-10-23 RX ORDER — HEPARIN SODIUM 1000 [USP'U]/ML
1000 INJECTION, SOLUTION INTRAVENOUS; SUBCUTANEOUS
Status: DISCONTINUED | OUTPATIENT
Start: 2021-10-23 | End: 2021-10-24 | Stop reason: HOSPADM

## 2021-10-23 RX ORDER — INSULIN ASPART 100 [IU]/ML
0-4 INJECTION, SOLUTION INTRAVENOUS; SUBCUTANEOUS
Status: DISCONTINUED | OUTPATIENT
Start: 2021-10-23 | End: 2021-10-23

## 2021-10-23 RX ADMIN — CARVEDILOL 25 MG: 25 TABLET, FILM COATED ORAL at 09:10

## 2021-10-23 RX ADMIN — ESCITALOPRAM OXALATE 20 MG: 20 TABLET ORAL at 10:10

## 2021-10-23 RX ADMIN — INSULIN ASPART 5 UNITS: 100 INJECTION, SOLUTION INTRAVENOUS; SUBCUTANEOUS at 01:10

## 2021-10-23 RX ADMIN — CEFTRIAXONE 1 G: 1 INJECTION, POWDER, FOR SOLUTION INTRAMUSCULAR; INTRAVENOUS at 10:10

## 2021-10-23 RX ADMIN — NIFEDIPINE 60 MG: 30 TABLET, FILM COATED, EXTENDED RELEASE ORAL at 09:10

## 2021-10-23 RX ADMIN — SODIUM CHLORIDE 2.5 UNITS/HR: 9 INJECTION, SOLUTION INTRAVENOUS at 12:10

## 2021-10-23 RX ADMIN — AZITHROMYCIN MONOHYDRATE 250 MG: 250 TABLET ORAL at 10:10

## 2021-10-23 RX ADMIN — CALCITRIOL CAPSULES 0.25 MCG 0.5 MCG: 0.25 CAPSULE ORAL at 10:10

## 2021-10-23 RX ADMIN — ASPIRIN 81 MG: 81 TABLET, COATED ORAL at 09:10

## 2021-10-23 RX ADMIN — HEPARIN SODIUM 5000 UNITS: 5000 INJECTION INTRAVENOUS; SUBCUTANEOUS at 09:10

## 2021-10-23 RX ADMIN — FERROUS SULFATE TAB 325 MG (65 MG ELEMENTAL FE) 1 EACH: 325 (65 FE) TAB at 10:10

## 2021-10-23 RX ADMIN — HYDRALAZINE HYDROCHLORIDE 25 MG: 25 TABLET, FILM COATED ORAL at 09:10

## 2021-10-23 RX ADMIN — HEPARIN SODIUM 5000 UNITS: 5000 INJECTION INTRAVENOUS; SUBCUTANEOUS at 05:10

## 2021-10-23 RX ADMIN — HYDRALAZINE HYDROCHLORIDE 25 MG: 25 TABLET, FILM COATED ORAL at 10:10

## 2021-10-23 RX ADMIN — SEVELAMER CARBONATE 1600 MG: 800 TABLET, FILM COATED ORAL at 10:10

## 2021-10-23 RX ADMIN — ONDANSETRON 4 MG: 2 INJECTION INTRAMUSCULAR; INTRAVENOUS at 12:10

## 2021-10-23 RX ADMIN — ATORVASTATIN CALCIUM 40 MG: 40 TABLET, FILM COATED ORAL at 10:10

## 2021-10-23 RX ADMIN — NIFEDIPINE 60 MG: 30 TABLET, FILM COATED, EXTENDED RELEASE ORAL at 10:10

## 2021-10-23 RX ADMIN — CARVEDILOL 25 MG: 25 TABLET, FILM COATED ORAL at 10:10

## 2021-10-23 RX ADMIN — INSULIN ASPART 10 UNITS: 100 INJECTION, SOLUTION INTRAVENOUS; SUBCUTANEOUS at 10:10

## 2021-10-23 RX ADMIN — EPOETIN ALFA-EPBX 4000 UNITS: 4000 INJECTION, SOLUTION INTRAVENOUS; SUBCUTANEOUS at 02:10

## 2021-10-23 RX ADMIN — INSULIN HUMAN 1.5 UNITS/HR: 1 INJECTION, SOLUTION INTRAVENOUS at 11:10

## 2021-10-23 RX ADMIN — Medication 6 MG: at 09:10

## 2021-10-24 VITALS
TEMPERATURE: 98 F | WEIGHT: 139.31 LBS | DIASTOLIC BLOOD PRESSURE: 89 MMHG | HEART RATE: 86 BPM | OXYGEN SATURATION: 98 % | HEIGHT: 64 IN | RESPIRATION RATE: 15 BRPM | BODY MASS INDEX: 23.78 KG/M2 | SYSTOLIC BLOOD PRESSURE: 157 MMHG

## 2021-10-24 LAB
ALBUMIN SERPL BCP-MCNC: 2.9 G/DL (ref 3.5–5.2)
ALP SERPL-CCNC: 80 U/L (ref 55–135)
ALT SERPL W/O P-5'-P-CCNC: 9 U/L (ref 10–44)
ANION GAP SERPL CALC-SCNC: 11 MMOL/L (ref 8–16)
ANION GAP SERPL CALC-SCNC: 13 MMOL/L (ref 8–16)
AST SERPL-CCNC: 12 U/L (ref 10–40)
BILIRUB SERPL-MCNC: 0.9 MG/DL (ref 0.1–1)
BUN SERPL-MCNC: 11 MG/DL (ref 6–20)
BUN SERPL-MCNC: 9 MG/DL (ref 6–20)
CALCIUM SERPL-MCNC: 8.9 MG/DL (ref 8.7–10.5)
CALCIUM SERPL-MCNC: 9.2 MG/DL (ref 8.7–10.5)
CHLORIDE SERPL-SCNC: 102 MMOL/L (ref 95–110)
CHLORIDE SERPL-SCNC: 103 MMOL/L (ref 95–110)
CO2 SERPL-SCNC: 24 MMOL/L (ref 23–29)
CO2 SERPL-SCNC: 28 MMOL/L (ref 23–29)
CREAT SERPL-MCNC: 2.6 MG/DL (ref 0.5–1.4)
CREAT SERPL-MCNC: 3.1 MG/DL (ref 0.5–1.4)
ERYTHROCYTE [DISTWIDTH] IN BLOOD BY AUTOMATED COUNT: 17.8 % (ref 11.5–14.5)
EST. GFR  (AFRICAN AMERICAN): 22.9 ML/MIN/1.73 M^2
EST. GFR  (AFRICAN AMERICAN): 28.3 ML/MIN/1.73 M^2
EST. GFR  (NON AFRICAN AMERICAN): 19.8 ML/MIN/1.73 M^2
EST. GFR  (NON AFRICAN AMERICAN): 24.6 ML/MIN/1.73 M^2
ESTIMATED AVG GLUCOSE: 266 MG/DL (ref 68–131)
GLUCOSE SERPL-MCNC: 124 MG/DL (ref 70–110)
GLUCOSE SERPL-MCNC: 138 MG/DL (ref 70–110)
HBA1C MFR BLD: 10.9 % (ref 4–5.6)
HCT VFR BLD AUTO: 28.6 % (ref 37–48.5)
HGB BLD-MCNC: 9.3 G/DL (ref 12–16)
MAGNESIUM SERPL-MCNC: 2 MG/DL (ref 1.6–2.6)
MCH RBC QN AUTO: 29.6 PG (ref 27–31)
MCHC RBC AUTO-ENTMCNC: 32.5 G/DL (ref 32–36)
MCV RBC AUTO: 91 FL (ref 82–98)
PHOSPHATE SERPL-MCNC: 4.1 MG/DL (ref 2.7–4.5)
PLATELET # BLD AUTO: 209 K/UL (ref 150–450)
PMV BLD AUTO: 11.2 FL (ref 9.2–12.9)
POCT GLUCOSE: 135 MG/DL (ref 70–110)
POCT GLUCOSE: 136 MG/DL (ref 70–110)
POCT GLUCOSE: 146 MG/DL (ref 70–110)
POCT GLUCOSE: 149 MG/DL (ref 70–110)
POCT GLUCOSE: 169 MG/DL (ref 70–110)
POCT GLUCOSE: 180 MG/DL (ref 70–110)
POCT GLUCOSE: 183 MG/DL (ref 70–110)
POCT GLUCOSE: 184 MG/DL (ref 70–110)
POCT GLUCOSE: 190 MG/DL (ref 70–110)
POCT GLUCOSE: 190 MG/DL (ref 70–110)
POCT GLUCOSE: 200 MG/DL (ref 70–110)
POCT GLUCOSE: 208 MG/DL (ref 70–110)
POCT GLUCOSE: 214 MG/DL (ref 70–110)
POCT GLUCOSE: 220 MG/DL (ref 70–110)
POTASSIUM SERPL-SCNC: 3.4 MMOL/L (ref 3.5–5.1)
POTASSIUM SERPL-SCNC: 3.6 MMOL/L (ref 3.5–5.1)
PROT SERPL-MCNC: 6.2 G/DL (ref 6–8.4)
RBC # BLD AUTO: 3.14 M/UL (ref 4–5.4)
SODIUM SERPL-SCNC: 140 MMOL/L (ref 136–145)
SODIUM SERPL-SCNC: 141 MMOL/L (ref 136–145)
WBC # BLD AUTO: 5.28 K/UL (ref 3.9–12.7)

## 2021-10-24 PROCEDURE — 99239 HOSP IP/OBS DSCHRG MGMT >30: CPT | Mod: NTX,,, | Performed by: INTERNAL MEDICINE

## 2021-10-24 PROCEDURE — 63700000 PHARM REV CODE 250 ALT 637 W/O HCPCS: Mod: NTX | Performed by: HOSPITALIST

## 2021-10-24 PROCEDURE — 25000003 PHARM REV CODE 250: Mod: NTX | Performed by: HOSPITALIST

## 2021-10-24 PROCEDURE — 99239 PR HOSPITAL DISCHARGE DAY,>30 MIN: ICD-10-PCS | Mod: NTX,,, | Performed by: INTERNAL MEDICINE

## 2021-10-24 PROCEDURE — 25000003 PHARM REV CODE 250: Mod: NTX | Performed by: STUDENT IN AN ORGANIZED HEALTH CARE EDUCATION/TRAINING PROGRAM

## 2021-10-24 PROCEDURE — 83735 ASSAY OF MAGNESIUM: CPT | Mod: NTX | Performed by: INTERNAL MEDICINE

## 2021-10-24 PROCEDURE — 80048 BASIC METABOLIC PNL TOTAL CA: CPT | Mod: NTX | Performed by: INTERNAL MEDICINE

## 2021-10-24 PROCEDURE — 99232 PR SUBSEQUENT HOSPITAL CARE,LEVL II: ICD-10-PCS | Mod: GC,NTX,, | Performed by: INTERNAL MEDICINE

## 2021-10-24 PROCEDURE — C9399 UNCLASSIFIED DRUGS OR BIOLOG: HCPCS | Mod: NTX | Performed by: STUDENT IN AN ORGANIZED HEALTH CARE EDUCATION/TRAINING PROGRAM

## 2021-10-24 PROCEDURE — 63600175 PHARM REV CODE 636 W HCPCS: Mod: NTX | Performed by: STUDENT IN AN ORGANIZED HEALTH CARE EDUCATION/TRAINING PROGRAM

## 2021-10-24 PROCEDURE — 99232 SBSQ HOSP IP/OBS MODERATE 35: CPT | Mod: GC,NTX,, | Performed by: INTERNAL MEDICINE

## 2021-10-24 PROCEDURE — 63600175 PHARM REV CODE 636 W HCPCS: Mod: NTX | Performed by: HOSPITALIST

## 2021-10-24 PROCEDURE — 80053 COMPREHEN METABOLIC PANEL: CPT | Mod: NTX | Performed by: INTERNAL MEDICINE

## 2021-10-24 PROCEDURE — 36415 COLL VENOUS BLD VENIPUNCTURE: CPT | Mod: NTX | Performed by: INTERNAL MEDICINE

## 2021-10-24 PROCEDURE — 85027 COMPLETE CBC AUTOMATED: CPT | Mod: NTX | Performed by: INTERNAL MEDICINE

## 2021-10-24 PROCEDURE — 84100 ASSAY OF PHOSPHORUS: CPT | Mod: NTX | Performed by: INTERNAL MEDICINE

## 2021-10-24 RX ORDER — IBUPROFEN 200 MG
24 TABLET ORAL
Status: DISCONTINUED | OUTPATIENT
Start: 2021-10-24 | End: 2021-10-24 | Stop reason: HOSPADM

## 2021-10-24 RX ORDER — GLUCAGON 1 MG
1 KIT INJECTION
Status: DISCONTINUED | OUTPATIENT
Start: 2021-10-24 | End: 2021-10-24 | Stop reason: HOSPADM

## 2021-10-24 RX ORDER — INSULIN ASPART 100 [IU]/ML
5 INJECTION, SOLUTION INTRAVENOUS; SUBCUTANEOUS
Status: DISCONTINUED | OUTPATIENT
Start: 2021-10-24 | End: 2021-10-24 | Stop reason: HOSPADM

## 2021-10-24 RX ORDER — HYDRALAZINE HYDROCHLORIDE 20 MG/ML
10 INJECTION INTRAMUSCULAR; INTRAVENOUS EVERY 6 HOURS PRN
Status: DISCONTINUED | OUTPATIENT
Start: 2021-10-24 | End: 2021-10-24 | Stop reason: HOSPADM

## 2021-10-24 RX ORDER — HYDRALAZINE HYDROCHLORIDE 50 MG/1
50 TABLET, FILM COATED ORAL 3 TIMES DAILY
Status: DISCONTINUED | OUTPATIENT
Start: 2021-10-24 | End: 2021-10-24 | Stop reason: HOSPADM

## 2021-10-24 RX ORDER — IBUPROFEN 200 MG
16 TABLET ORAL
Status: DISCONTINUED | OUTPATIENT
Start: 2021-10-24 | End: 2021-10-24 | Stop reason: HOSPADM

## 2021-10-24 RX ORDER — INSULIN ASPART 100 [IU]/ML
0-5 INJECTION, SOLUTION INTRAVENOUS; SUBCUTANEOUS
Status: DISCONTINUED | OUTPATIENT
Start: 2021-10-24 | End: 2021-10-24 | Stop reason: HOSPADM

## 2021-10-24 RX ADMIN — CARVEDILOL 25 MG: 25 TABLET, FILM COATED ORAL at 09:10

## 2021-10-24 RX ADMIN — INSULIN ASPART 2 UNITS: 100 INJECTION, SOLUTION INTRAVENOUS; SUBCUTANEOUS at 02:10

## 2021-10-24 RX ADMIN — CEFTRIAXONE 1 G: 1 INJECTION, POWDER, FOR SOLUTION INTRAMUSCULAR; INTRAVENOUS at 09:10

## 2021-10-24 RX ADMIN — ESCITALOPRAM OXALATE 20 MG: 20 TABLET ORAL at 09:10

## 2021-10-24 RX ADMIN — AZITHROMYCIN MONOHYDRATE 250 MG: 250 TABLET ORAL at 09:10

## 2021-10-24 RX ADMIN — HYDRALAZINE HYDROCHLORIDE 25 MG: 25 TABLET, FILM COATED ORAL at 09:10

## 2021-10-24 RX ADMIN — HEPARIN SODIUM 5000 UNITS: 5000 INJECTION INTRAVENOUS; SUBCUTANEOUS at 06:10

## 2021-10-24 RX ADMIN — INSULIN DETEMIR 15 UNITS: 100 INJECTION, SOLUTION SUBCUTANEOUS at 10:10

## 2021-10-24 RX ADMIN — NIFEDIPINE 60 MG: 30 TABLET, FILM COATED, EXTENDED RELEASE ORAL at 09:10

## 2021-10-24 RX ADMIN — INSULIN ASPART 5 UNITS: 100 INJECTION, SOLUTION INTRAVENOUS; SUBCUTANEOUS at 02:10

## 2021-10-24 RX ADMIN — ATORVASTATIN CALCIUM 40 MG: 40 TABLET, FILM COATED ORAL at 09:10

## 2021-10-24 RX ADMIN — CALCITRIOL CAPSULES 0.25 MCG 0.5 MCG: 0.25 CAPSULE ORAL at 09:10

## 2021-10-24 RX ADMIN — FERROUS SULFATE TAB 325 MG (65 MG ELEMENTAL FE) 1 EACH: 325 (65 FE) TAB at 09:10

## 2021-10-25 ENCOUNTER — PATIENT MESSAGE (OUTPATIENT)
Dept: ADMINISTRATIVE | Facility: CLINIC | Age: 26
End: 2021-10-25
Payer: MEDICAID

## 2021-10-25 ENCOUNTER — PATIENT OUTREACH (OUTPATIENT)
Dept: ADMINISTRATIVE | Facility: CLINIC | Age: 26
End: 2021-10-25
Payer: MEDICAID

## 2021-10-25 LAB
HBV SURFACE AG SERPL QL IA: NEGATIVE
POCT GLUCOSE: 203 MG/DL (ref 70–110)
POCT GLUCOSE: 255 MG/DL (ref 70–110)

## 2021-10-27 LAB
BACTERIA BLD CULT: NORMAL
BACTERIA BLD CULT: NORMAL

## 2021-10-29 ENCOUNTER — OFFICE VISIT (OUTPATIENT)
Dept: INTERNAL MEDICINE | Facility: CLINIC | Age: 26
End: 2021-10-29
Attending: FAMILY MEDICINE
Payer: MEDICARE

## 2021-10-29 VITALS
HEIGHT: 64 IN | HEART RATE: 91 BPM | OXYGEN SATURATION: 99 % | SYSTOLIC BLOOD PRESSURE: 130 MMHG | BODY MASS INDEX: 23.14 KG/M2 | WEIGHT: 135.56 LBS | DIASTOLIC BLOOD PRESSURE: 88 MMHG

## 2021-10-29 DIAGNOSIS — Z99.2 ESRD (END STAGE RENAL DISEASE) ON DIALYSIS: Primary | ICD-10-CM

## 2021-10-29 DIAGNOSIS — Z76.82 ORGAN TRANSPLANT CANDIDATE: ICD-10-CM

## 2021-10-29 DIAGNOSIS — N18.6 TYPE 1 DIABETES MELLITUS WITH END-STAGE RENAL DISEASE (ESRD): ICD-10-CM

## 2021-10-29 DIAGNOSIS — J98.4 RESTRICTIVE LUNG DISEASE: ICD-10-CM

## 2021-10-29 DIAGNOSIS — N18.30 ANEMIA DUE TO STAGE 3 CHRONIC KIDNEY DISEASE, UNSPECIFIED WHETHER STAGE 3A OR 3B CKD: ICD-10-CM

## 2021-10-29 DIAGNOSIS — N18.6 ESRD (END STAGE RENAL DISEASE) ON DIALYSIS: Primary | ICD-10-CM

## 2021-10-29 DIAGNOSIS — E10.22 TYPE 1 DIABETES MELLITUS WITH END-STAGE RENAL DISEASE (ESRD): ICD-10-CM

## 2021-10-29 DIAGNOSIS — D63.1 ANEMIA DUE TO STAGE 3 CHRONIC KIDNEY DISEASE, UNSPECIFIED WHETHER STAGE 3A OR 3B CKD: ICD-10-CM

## 2021-10-29 DIAGNOSIS — D50.8 OTHER IRON DEFICIENCY ANEMIA: ICD-10-CM

## 2021-10-29 PROBLEM — J96.01 ACUTE HYPOXEMIC RESPIRATORY FAILURE: Status: RESOLVED | Noted: 2021-09-17 | Resolved: 2021-10-29

## 2021-10-29 PROBLEM — I10 ESSENTIAL HYPERTENSION: Status: RESOLVED | Noted: 2021-09-17 | Resolved: 2021-10-29

## 2021-10-29 PROBLEM — J81.0 ACUTE PULMONARY EDEMA: Status: RESOLVED | Noted: 2021-10-22 | Resolved: 2021-10-29

## 2021-10-29 PROBLEM — Z02.9 DISCHARGE PLANNING ISSUES: Status: RESOLVED | Noted: 2021-05-22 | Resolved: 2021-10-29

## 2021-10-29 PROBLEM — R14.0 ABDOMINAL DISTENSION: Status: RESOLVED | Noted: 2021-03-04 | Resolved: 2021-10-29

## 2021-10-29 PROBLEM — R60.0 BILATERAL LOWER EXTREMITY EDEMA: Status: RESOLVED | Noted: 2019-02-25 | Resolved: 2021-10-29

## 2021-10-29 PROBLEM — Z75.8 DISCHARGE PLANNING ISSUES: Status: RESOLVED | Noted: 2021-05-22 | Resolved: 2021-10-29

## 2021-10-29 PROCEDURE — 3008F BODY MASS INDEX DOCD: CPT | Mod: CPTII,S$GLB,, | Performed by: FAMILY MEDICINE

## 2021-10-29 PROCEDURE — 1111F DSCHRG MED/CURRENT MED MERGE: CPT | Mod: CPTII,S$GLB,, | Performed by: FAMILY MEDICINE

## 2021-10-29 PROCEDURE — 3066F NEPHROPATHY DOC TX: CPT | Mod: CPTII,S$GLB,, | Performed by: FAMILY MEDICINE

## 2021-10-29 PROCEDURE — 1159F MED LIST DOCD IN RCRD: CPT | Mod: CPTII,S$GLB,, | Performed by: FAMILY MEDICINE

## 2021-10-29 PROCEDURE — 99214 OFFICE O/P EST MOD 30 MIN: CPT | Mod: S$GLB,,, | Performed by: FAMILY MEDICINE

## 2021-10-29 PROCEDURE — 1160F PR REVIEW ALL MEDS BY PRESCRIBER/CLIN PHARMACIST DOCUMENTED: ICD-10-PCS | Mod: CPTII,S$GLB,, | Performed by: FAMILY MEDICINE

## 2021-10-29 PROCEDURE — 1160F RVW MEDS BY RX/DR IN RCRD: CPT | Mod: CPTII,S$GLB,, | Performed by: FAMILY MEDICINE

## 2021-10-29 PROCEDURE — 3075F PR MOST RECENT SYSTOLIC BLOOD PRESS GE 130-139MM HG: ICD-10-PCS | Mod: CPTII,S$GLB,, | Performed by: FAMILY MEDICINE

## 2021-10-29 PROCEDURE — 3066F PR DOCUMENTATION OF TREATMENT FOR NEPHROPATHY: ICD-10-PCS | Mod: CPTII,S$GLB,, | Performed by: FAMILY MEDICINE

## 2021-10-29 PROCEDURE — 1159F PR MEDICATION LIST DOCUMENTED IN MEDICAL RECORD: ICD-10-PCS | Mod: CPTII,S$GLB,, | Performed by: FAMILY MEDICINE

## 2021-10-29 PROCEDURE — 3075F SYST BP GE 130 - 139MM HG: CPT | Mod: CPTII,S$GLB,, | Performed by: FAMILY MEDICINE

## 2021-10-29 PROCEDURE — 3046F PR MOST RECENT HEMOGLOBIN A1C LEVEL > 9.0%: ICD-10-PCS | Mod: CPTII,S$GLB,, | Performed by: FAMILY MEDICINE

## 2021-10-29 PROCEDURE — 99214 PR OFFICE/OUTPT VISIT, EST, LEVL IV, 30-39 MIN: ICD-10-PCS | Mod: S$GLB,,, | Performed by: FAMILY MEDICINE

## 2021-10-29 PROCEDURE — 3079F PR MOST RECENT DIASTOLIC BLOOD PRESSURE 80-89 MM HG: ICD-10-PCS | Mod: CPTII,S$GLB,, | Performed by: FAMILY MEDICINE

## 2021-10-29 PROCEDURE — 1111F PR DISCHARGE MEDS RECONCILED W/ CURRENT OUTPATIENT MED LIST: ICD-10-PCS | Mod: CPTII,S$GLB,, | Performed by: FAMILY MEDICINE

## 2021-10-29 PROCEDURE — 3079F DIAST BP 80-89 MM HG: CPT | Mod: CPTII,S$GLB,, | Performed by: FAMILY MEDICINE

## 2021-10-29 PROCEDURE — 4010F ACE/ARB THERAPY RXD/TAKEN: CPT | Mod: CPTII,S$GLB,, | Performed by: FAMILY MEDICINE

## 2021-10-29 PROCEDURE — 4010F PR ACE/ARB THEARPY RXD/TAKEN: ICD-10-PCS | Mod: CPTII,S$GLB,, | Performed by: FAMILY MEDICINE

## 2021-10-29 PROCEDURE — 3008F PR BODY MASS INDEX (BMI) DOCUMENTED: ICD-10-PCS | Mod: CPTII,S$GLB,, | Performed by: FAMILY MEDICINE

## 2021-10-29 PROCEDURE — 99999 PR PBB SHADOW E&M-EST. PATIENT-LVL III: ICD-10-PCS | Mod: PBBFAC,,, | Performed by: FAMILY MEDICINE

## 2021-10-29 PROCEDURE — 3046F HEMOGLOBIN A1C LEVEL >9.0%: CPT | Mod: CPTII,S$GLB,, | Performed by: FAMILY MEDICINE

## 2021-10-29 PROCEDURE — 99999 PR PBB SHADOW E&M-EST. PATIENT-LVL III: CPT | Mod: PBBFAC,,, | Performed by: FAMILY MEDICINE

## 2021-11-03 ENCOUNTER — PATIENT MESSAGE (OUTPATIENT)
Dept: PULMONOLOGY | Facility: CLINIC | Age: 26
End: 2021-11-03
Payer: MEDICAID

## 2021-11-03 DIAGNOSIS — R06.02 SHORTNESS OF BREATH: Primary | ICD-10-CM

## 2021-11-04 ENCOUNTER — HOSPITAL ENCOUNTER (OUTPATIENT)
Dept: PULMONOLOGY | Facility: CLINIC | Age: 26
Discharge: HOME | End: 2021-11-04
Payer: MEDICARE

## 2021-11-04 DIAGNOSIS — R06.02 SHORTNESS OF BREATH: ICD-10-CM

## 2021-11-04 PROCEDURE — 94727 PR PULM FUNCTION TEST BY GAS: ICD-10-PCS | Mod: S$GLB,TXP,, | Performed by: INTERNAL MEDICINE

## 2021-11-04 PROCEDURE — 94010 BREATHING CAPACITY TEST: CPT | Mod: S$GLB,TXP,, | Performed by: INTERNAL MEDICINE

## 2021-11-04 PROCEDURE — 94729 PR C02/MEMBANE DIFFUSE CAPACITY: ICD-10-PCS | Mod: S$GLB,TXP,, | Performed by: INTERNAL MEDICINE

## 2021-11-04 PROCEDURE — 94727 GAS DIL/WSHOT DETER LNG VOL: CPT | Mod: S$GLB,TXP,, | Performed by: INTERNAL MEDICINE

## 2021-11-04 PROCEDURE — 94010 BREATHING CAPACITY TEST: ICD-10-PCS | Mod: S$GLB,TXP,, | Performed by: INTERNAL MEDICINE

## 2021-11-04 PROCEDURE — 94729 DIFFUSING CAPACITY: CPT | Mod: S$GLB,TXP,, | Performed by: INTERNAL MEDICINE

## 2021-11-07 ENCOUNTER — PATIENT OUTREACH (OUTPATIENT)
Dept: ADMINISTRATIVE | Facility: OTHER | Age: 26
End: 2021-11-07
Payer: MEDICAID

## 2021-11-09 ENCOUNTER — TELEPHONE (OUTPATIENT)
Dept: VASCULAR SURGERY | Facility: CLINIC | Age: 26
End: 2021-11-09
Payer: MEDICAID

## 2021-11-09 DIAGNOSIS — Z99.2 END STAGE RENAL FAILURE ON DIALYSIS: Primary | ICD-10-CM

## 2021-11-09 DIAGNOSIS — N18.6 END STAGE RENAL FAILURE ON DIALYSIS: Primary | ICD-10-CM

## 2021-11-10 DIAGNOSIS — I42.9 CARDIOMYOPATHY, UNSPECIFIED TYPE: Primary | ICD-10-CM

## 2021-11-16 ENCOUNTER — PATIENT MESSAGE (OUTPATIENT)
Dept: INTERNAL MEDICINE | Facility: CLINIC | Age: 26
End: 2021-11-16
Payer: MEDICAID

## 2021-11-16 ENCOUNTER — HOSPITAL ENCOUNTER (OUTPATIENT)
Dept: RADIOLOGY | Facility: HOSPITAL | Age: 26
Discharge: HOME | End: 2021-11-16
Attending: STUDENT IN AN ORGANIZED HEALTH CARE EDUCATION/TRAINING PROGRAM
Payer: MEDICARE

## 2021-11-16 DIAGNOSIS — E10.22 TYPE 1 DIABETES MELLITUS WITH STAGE 5 CHRONIC KIDNEY DISEASE NOT ON CHRONIC DIALYSIS: ICD-10-CM

## 2021-11-16 DIAGNOSIS — I42.9 CARDIOMYOPATHY, UNSPECIFIED TYPE: ICD-10-CM

## 2021-11-16 DIAGNOSIS — N18.5 TYPE 1 DIABETES MELLITUS WITH STAGE 5 CHRONIC KIDNEY DISEASE NOT ON CHRONIC DIALYSIS: ICD-10-CM

## 2021-11-16 PROCEDURE — 75557 CARDIAC MRI FOR MORPH: CPT | Mod: TC,TXP

## 2021-11-16 PROCEDURE — 75561 CV CARDIAC MRI MORPHOLOGY (CUPID ONLY): ICD-10-PCS | Mod: 26,TXP,, | Performed by: INTERNAL MEDICINE

## 2021-11-16 PROCEDURE — 75557 MRI CARDIAC WO CONTRAST: ICD-10-PCS | Mod: 26,TXP,, | Performed by: RADIOLOGY

## 2021-11-16 PROCEDURE — 75561 CARDIAC MRI FOR MORPH W/DYE: CPT | Mod: 26,TXP,, | Performed by: INTERNAL MEDICINE

## 2021-11-16 PROCEDURE — 75557 CARDIAC MRI FOR MORPH: CPT | Mod: 26,TXP,, | Performed by: RADIOLOGY

## 2021-11-16 PROCEDURE — 75561 CARDIAC MRI FOR MORPH W/DYE: CPT | Mod: TC,TXP

## 2021-11-16 RX ORDER — INSULIN LISPRO 100 [IU]/ML
INJECTION, SOLUTION INTRAVENOUS; SUBCUTANEOUS
Qty: 15 ML | Refills: 6 | Status: SHIPPED | OUTPATIENT
Start: 2021-11-16 | End: 2022-01-27 | Stop reason: SDUPTHER

## 2021-11-17 RX ORDER — ROSUVASTATIN CALCIUM 20 MG/1
20 TABLET, COATED ORAL NIGHTLY
Qty: 90 TABLET | Refills: 3 | Status: ON HOLD | OUTPATIENT
Start: 2021-11-17 | End: 2022-05-09

## 2021-11-22 ENCOUNTER — TELEPHONE (OUTPATIENT)
Dept: INTERNAL MEDICINE | Facility: CLINIC | Age: 26
End: 2021-11-22
Payer: MEDICAID

## 2021-11-24 ENCOUNTER — TELEPHONE (OUTPATIENT)
Dept: INTERNAL MEDICINE | Facility: CLINIC | Age: 26
End: 2021-11-24
Payer: MEDICAID

## 2021-12-07 ENCOUNTER — TELEPHONE (OUTPATIENT)
Dept: TRANSPLANT | Facility: CLINIC | Age: 26
End: 2021-12-07
Payer: MEDICAID

## 2021-12-08 ENCOUNTER — TELEPHONE (OUTPATIENT)
Dept: GASTROENTEROLOGY | Facility: CLINIC | Age: 26
End: 2021-12-08
Payer: MEDICAID

## 2021-12-09 ENCOUNTER — ANESTHESIA EVENT (OUTPATIENT)
Dept: SURGERY | Facility: HOSPITAL | Age: 26
End: 2021-12-09
Payer: MEDICARE

## 2021-12-09 ENCOUNTER — PATIENT MESSAGE (OUTPATIENT)
Dept: ENDOSCOPY | Facility: HOSPITAL | Age: 26
End: 2021-12-09
Payer: MEDICAID

## 2021-12-09 ENCOUNTER — TELEPHONE (OUTPATIENT)
Dept: VASCULAR SURGERY | Facility: CLINIC | Age: 26
End: 2021-12-09
Payer: MEDICAID

## 2021-12-09 DIAGNOSIS — Z99.2 DIALYSIS PATIENT: Primary | ICD-10-CM

## 2021-12-09 RX ORDER — FENTANYL CITRATE 50 UG/ML
25-200 INJECTION, SOLUTION INTRAMUSCULAR; INTRAVENOUS
Status: CANCELLED | OUTPATIENT
Start: 2021-12-09

## 2021-12-09 RX ORDER — MIDAZOLAM HYDROCHLORIDE 1 MG/ML
.5-4 INJECTION INTRAMUSCULAR; INTRAVENOUS
Status: CANCELLED | OUTPATIENT
Start: 2021-12-09

## 2021-12-10 ENCOUNTER — ANESTHESIA (OUTPATIENT)
Dept: SURGERY | Facility: HOSPITAL | Age: 26
End: 2021-12-10
Payer: MEDICARE

## 2021-12-10 ENCOUNTER — HOSPITAL ENCOUNTER (OUTPATIENT)
Facility: HOSPITAL | Age: 26
Discharge: HOME | End: 2021-12-10
Attending: SURGERY | Admitting: SURGERY
Payer: MEDICARE

## 2021-12-10 VITALS
DIASTOLIC BLOOD PRESSURE: 85 MMHG | HEIGHT: 64 IN | BODY MASS INDEX: 24.28 KG/M2 | SYSTOLIC BLOOD PRESSURE: 145 MMHG | HEART RATE: 73 BPM | RESPIRATION RATE: 18 BRPM | OXYGEN SATURATION: 100 % | WEIGHT: 142.19 LBS

## 2021-12-10 DIAGNOSIS — Z99.2 END STAGE RENAL FAILURE ON DIALYSIS: Primary | ICD-10-CM

## 2021-12-10 DIAGNOSIS — N18.6 END STAGE RENAL FAILURE ON DIALYSIS: Primary | ICD-10-CM

## 2021-12-10 DIAGNOSIS — N18.6 ESRD (END STAGE RENAL DISEASE): ICD-10-CM

## 2021-12-10 LAB
POCT GLUCOSE: 242 MG/DL (ref 70–110)
POCT GLUCOSE: 91 MG/DL (ref 70–110)
POCT GLUCOSE: 92 MG/DL (ref 70–110)

## 2021-12-10 PROCEDURE — D9220A PRA ANESTHESIA: Mod: NTX,,, | Performed by: ANESTHESIOLOGY

## 2021-12-10 PROCEDURE — 82962 GLUCOSE BLOOD TEST: CPT | Mod: NTX | Performed by: SURGERY

## 2021-12-10 PROCEDURE — 25000003 PHARM REV CODE 250: Mod: NTX | Performed by: STUDENT IN AN ORGANIZED HEALTH CARE EDUCATION/TRAINING PROGRAM

## 2021-12-10 PROCEDURE — 63600175 PHARM REV CODE 636 W HCPCS: Mod: NTX | Performed by: STUDENT IN AN ORGANIZED HEALTH CARE EDUCATION/TRAINING PROGRAM

## 2021-12-10 PROCEDURE — 63600175 PHARM REV CODE 636 W HCPCS: Mod: TXP | Performed by: STUDENT IN AN ORGANIZED HEALTH CARE EDUCATION/TRAINING PROGRAM

## 2021-12-10 PROCEDURE — 71000016 HC POSTOP RECOV ADDL HR: Mod: TXP | Performed by: SURGERY

## 2021-12-10 PROCEDURE — 63600175 PHARM REV CODE 636 W HCPCS: Mod: NTX | Performed by: SURGERY

## 2021-12-10 PROCEDURE — 36832 PR AV FISTULA REVISION, OPEN, W/O THROMBECTOMY: ICD-10-PCS | Mod: NTX,,, | Performed by: SURGERY

## 2021-12-10 PROCEDURE — 37000009 HC ANESTHESIA EA ADD 15 MINS: Mod: TXP | Performed by: SURGERY

## 2021-12-10 PROCEDURE — 71000044 HC DOSC ROUTINE RECOVERY FIRST HOUR: Mod: TXP | Performed by: SURGERY

## 2021-12-10 PROCEDURE — 36000706: Mod: NTX | Performed by: SURGERY

## 2021-12-10 PROCEDURE — D9220A PRA ANESTHESIA: ICD-10-PCS | Mod: NTX,,, | Performed by: ANESTHESIOLOGY

## 2021-12-10 PROCEDURE — 63600175 PHARM REV CODE 636 W HCPCS: Mod: TXP | Performed by: ANESTHESIOLOGY

## 2021-12-10 PROCEDURE — 37000008 HC ANESTHESIA 1ST 15 MINUTES: Mod: NTX | Performed by: SURGERY

## 2021-12-10 PROCEDURE — 36000707: Mod: TXP | Performed by: SURGERY

## 2021-12-10 PROCEDURE — 36832 AV FISTULA REVISION OPEN: CPT | Mod: NTX,,, | Performed by: SURGERY

## 2021-12-10 PROCEDURE — 71000015 HC POSTOP RECOV 1ST HR: Mod: NTX | Performed by: SURGERY

## 2021-12-10 RX ORDER — SODIUM CHLORIDE 9 MG/ML
INJECTION, SOLUTION INTRAVENOUS CONTINUOUS
Status: DISCONTINUED | OUTPATIENT
Start: 2021-12-10 | End: 2022-11-03

## 2021-12-10 RX ORDER — OXYCODONE HYDROCHLORIDE 5 MG/1
5 TABLET ORAL EVERY 6 HOURS PRN
Qty: 20 TABLET | Refills: 0 | Status: ON HOLD | OUTPATIENT
Start: 2021-12-10 | End: 2022-01-25

## 2021-12-10 RX ORDER — ROPIVACAINE HYDROCHLORIDE 2 MG/ML
INJECTION, SOLUTION EPIDURAL; INFILTRATION; PERINEURAL
Status: DISCONTINUED
Start: 2021-12-10 | End: 2021-12-10 | Stop reason: HOSPADM

## 2021-12-10 RX ORDER — SODIUM CHLORIDE 0.9 % (FLUSH) 0.9 %
10 SYRINGE (ML) INJECTION
Status: DISCONTINUED | OUTPATIENT
Start: 2021-12-10 | End: 2021-12-10 | Stop reason: HOSPADM

## 2021-12-10 RX ORDER — CEFAZOLIN SODIUM 1 G/3ML
2 INJECTION, POWDER, FOR SOLUTION INTRAMUSCULAR; INTRAVENOUS
Status: COMPLETED | OUTPATIENT
Start: 2021-12-10 | End: 2021-12-10

## 2021-12-10 RX ORDER — FENTANYL CITRATE 50 UG/ML
INJECTION, SOLUTION INTRAMUSCULAR; INTRAVENOUS
Status: DISCONTINUED | OUTPATIENT
Start: 2021-12-10 | End: 2021-12-10

## 2021-12-10 RX ORDER — EPINEPHRINE 1 MG/ML
INJECTION, SOLUTION INTRACARDIAC; INTRAMUSCULAR; INTRAVENOUS; SUBCUTANEOUS
Status: DISCONTINUED
Start: 2021-12-10 | End: 2021-12-10 | Stop reason: HOSPADM

## 2021-12-10 RX ORDER — ONDANSETRON 2 MG/ML
4 INJECTION INTRAMUSCULAR; INTRAVENOUS ONCE
Status: DISCONTINUED | OUTPATIENT
Start: 2021-12-10 | End: 2021-12-10 | Stop reason: HOSPADM

## 2021-12-10 RX ORDER — ONDANSETRON 2 MG/ML
INJECTION INTRAMUSCULAR; INTRAVENOUS
Status: DISCONTINUED | OUTPATIENT
Start: 2021-12-10 | End: 2021-12-10

## 2021-12-10 RX ORDER — ACETAMINOPHEN 500 MG
1000 TABLET ORAL ONCE
Status: COMPLETED | OUTPATIENT
Start: 2021-12-10 | End: 2021-12-10

## 2021-12-10 RX ORDER — PROPOFOL 10 MG/ML
VIAL (ML) INTRAVENOUS CONTINUOUS PRN
Status: DISCONTINUED | OUTPATIENT
Start: 2021-12-10 | End: 2021-12-10

## 2021-12-10 RX ORDER — MIDAZOLAM HYDROCHLORIDE 1 MG/ML
INJECTION, SOLUTION INTRAMUSCULAR; INTRAVENOUS
Status: DISCONTINUED | OUTPATIENT
Start: 2021-12-10 | End: 2021-12-10

## 2021-12-10 RX ORDER — SCOLOPAMINE TRANSDERMAL SYSTEM 1 MG/1
1 PATCH, EXTENDED RELEASE TRANSDERMAL ONCE
Status: DISCONTINUED | OUTPATIENT
Start: 2021-12-10 | End: 2021-12-10 | Stop reason: HOSPADM

## 2021-12-10 RX ORDER — HEPARIN SODIUM 1000 [USP'U]/ML
INJECTION, SOLUTION INTRAVENOUS; SUBCUTANEOUS
Status: DISCONTINUED | OUTPATIENT
Start: 2021-12-10 | End: 2021-12-10 | Stop reason: HOSPADM

## 2021-12-10 RX ORDER — FENTANYL CITRATE 50 UG/ML
25 INJECTION, SOLUTION INTRAMUSCULAR; INTRAVENOUS EVERY 5 MIN PRN
Status: DISCONTINUED | OUTPATIENT
Start: 2021-12-10 | End: 2021-12-10 | Stop reason: HOSPADM

## 2021-12-10 RX ADMIN — SODIUM CHLORIDE: 0.9 INJECTION, SOLUTION INTRAVENOUS at 10:12

## 2021-12-10 RX ADMIN — FENTANYL CITRATE 25 MCG: 50 INJECTION INTRAMUSCULAR; INTRAVENOUS at 12:12

## 2021-12-10 RX ADMIN — ACETAMINOPHEN 1000 MG: 500 TABLET ORAL at 09:12

## 2021-12-10 RX ADMIN — CEFAZOLIN 2 G: 330 INJECTION, POWDER, FOR SOLUTION INTRAMUSCULAR; INTRAVENOUS at 11:12

## 2021-12-10 RX ADMIN — MIDAZOLAM 2 MG: 1 INJECTION INTRAMUSCULAR; INTRAVENOUS at 10:12

## 2021-12-10 RX ADMIN — SCOPALAMINE 1 PATCH: 1 PATCH, EXTENDED RELEASE TRANSDERMAL at 09:12

## 2021-12-10 RX ADMIN — ONDANSETRON 4 MG: 2 INJECTION INTRAMUSCULAR; INTRAVENOUS at 11:12

## 2021-12-10 RX ADMIN — FENTANYL CITRATE 50 MCG: 50 INJECTION INTRAMUSCULAR; INTRAVENOUS at 12:12

## 2021-12-10 RX ADMIN — PROPOFOL 50 MCG/KG/MIN: 10 INJECTION, EMULSION INTRAVENOUS at 10:12

## 2021-12-10 RX ADMIN — MEPIVACAINE HYDROCHLORIDE 40 ML: 15 INJECTION, SOLUTION EPIDURAL; INFILTRATION at 11:12

## 2021-12-10 RX ADMIN — FENTANYL CITRATE 50 MCG: 50 INJECTION INTRAMUSCULAR; INTRAVENOUS at 10:12

## 2021-12-13 ENCOUNTER — OFFICE VISIT (OUTPATIENT)
Dept: OPHTHALMOLOGY | Facility: CLINIC | Age: 26
End: 2021-12-13
Payer: MEDICARE

## 2021-12-13 ENCOUNTER — PATIENT OUTREACH (OUTPATIENT)
Dept: ADMINISTRATIVE | Facility: OTHER | Age: 26
End: 2021-12-13
Payer: MEDICAID

## 2021-12-13 DIAGNOSIS — E10.3521 TYPE 1 DIABETES MELLITUS WITH RIGHT EYE AFFECTED BY PROLIFERATIVE RETINOPATHY AND TRACTION RETINAL DETACHMENT INVOLVING MACULA: Primary | ICD-10-CM

## 2021-12-13 DIAGNOSIS — E10.3592 TYPE 1 DIABETES MELLITUS WITH PROLIFERATIVE RETINOPATHY OF LEFT EYE WITHOUT MACULAR EDEMA: ICD-10-CM

## 2021-12-13 PROCEDURE — 4010F PR ACE/ARB THEARPY RXD/TAKEN: ICD-10-PCS | Mod: CPTII,S$GLB,, | Performed by: OPHTHALMOLOGY

## 2021-12-13 PROCEDURE — 99214 OFFICE O/P EST MOD 30 MIN: CPT | Mod: 25,S$GLB,, | Performed by: OPHTHALMOLOGY

## 2021-12-13 PROCEDURE — 99499 UNLISTED E&M SERVICE: CPT | Mod: S$GLB,,, | Performed by: OPHTHALMOLOGY

## 2021-12-13 PROCEDURE — 3066F PR DOCUMENTATION OF TREATMENT FOR NEPHROPATHY: ICD-10-PCS | Mod: CPTII,S$GLB,, | Performed by: OPHTHALMOLOGY

## 2021-12-13 PROCEDURE — 67228 PR TREATMENT EXTENSIVE RETINOPATHY, PHOTOCOAGULATION: ICD-10-PCS | Mod: LT,S$GLB,, | Performed by: OPHTHALMOLOGY

## 2021-12-13 PROCEDURE — 99499 RISK ADDL DX/OHS AUDIT: ICD-10-PCS | Mod: S$GLB,,, | Performed by: OPHTHALMOLOGY

## 2021-12-13 PROCEDURE — 99214 PR OFFICE/OUTPT VISIT, EST, LEVL IV, 30-39 MIN: ICD-10-PCS | Mod: 25,S$GLB,, | Performed by: OPHTHALMOLOGY

## 2021-12-13 PROCEDURE — 99999 PR PBB SHADOW E&M-EST. PATIENT-LVL IV: ICD-10-PCS | Mod: PBBFAC,,, | Performed by: OPHTHALMOLOGY

## 2021-12-13 PROCEDURE — 3066F NEPHROPATHY DOC TX: CPT | Mod: CPTII,S$GLB,, | Performed by: OPHTHALMOLOGY

## 2021-12-13 PROCEDURE — 67228 TREATMENT X10SV RETINOPATHY: CPT | Mod: LT,S$GLB,, | Performed by: OPHTHALMOLOGY

## 2021-12-13 PROCEDURE — 92134 CPTRZ OPH DX IMG PST SGM RTA: CPT | Mod: S$GLB,,, | Performed by: OPHTHALMOLOGY

## 2021-12-13 PROCEDURE — 92134 POSTERIOR SEGMENT OCT RETINA (OCULAR COHERENCE TOMOGRAPHY)-BOTH EYES: ICD-10-PCS | Mod: S$GLB,,, | Performed by: OPHTHALMOLOGY

## 2021-12-13 PROCEDURE — 4010F ACE/ARB THERAPY RXD/TAKEN: CPT | Mod: CPTII,S$GLB,, | Performed by: OPHTHALMOLOGY

## 2021-12-13 PROCEDURE — 99999 PR PBB SHADOW E&M-EST. PATIENT-LVL IV: CPT | Mod: PBBFAC,,, | Performed by: OPHTHALMOLOGY

## 2021-12-14 ENCOUNTER — OFFICE VISIT (OUTPATIENT)
Dept: OBSTETRICS AND GYNECOLOGY | Facility: CLINIC | Age: 26
End: 2021-12-14
Payer: MEDICARE

## 2021-12-14 VITALS
DIASTOLIC BLOOD PRESSURE: 100 MMHG | HEIGHT: 64 IN | SYSTOLIC BLOOD PRESSURE: 180 MMHG | WEIGHT: 145.75 LBS | BODY MASS INDEX: 24.88 KG/M2

## 2021-12-14 DIAGNOSIS — Z30.9 ENCOUNTER FOR CONTRACEPTIVE MANAGEMENT, UNSPECIFIED TYPE: Primary | ICD-10-CM

## 2021-12-14 LAB
B-HCG UR QL: NEGATIVE
CTP QC/QA: YES

## 2021-12-14 PROCEDURE — 4010F ACE/ARB THERAPY RXD/TAKEN: CPT | Mod: CPTII,S$GLB,, | Performed by: OBSTETRICS & GYNECOLOGY

## 2021-12-14 PROCEDURE — 3046F HEMOGLOBIN A1C LEVEL >9.0%: CPT | Mod: CPTII,S$GLB,, | Performed by: OBSTETRICS & GYNECOLOGY

## 2021-12-14 PROCEDURE — 4010F PR ACE/ARB THEARPY RXD/TAKEN: ICD-10-PCS | Mod: CPTII,S$GLB,, | Performed by: OBSTETRICS & GYNECOLOGY

## 2021-12-14 PROCEDURE — 3008F PR BODY MASS INDEX (BMI) DOCUMENTED: ICD-10-PCS | Mod: CPTII,S$GLB,, | Performed by: OBSTETRICS & GYNECOLOGY

## 2021-12-14 PROCEDURE — 3046F PR MOST RECENT HEMOGLOBIN A1C LEVEL > 9.0%: ICD-10-PCS | Mod: CPTII,S$GLB,, | Performed by: OBSTETRICS & GYNECOLOGY

## 2021-12-14 PROCEDURE — 3080F DIAST BP >= 90 MM HG: CPT | Mod: CPTII,S$GLB,, | Performed by: OBSTETRICS & GYNECOLOGY

## 2021-12-14 PROCEDURE — 99214 PR OFFICE/OUTPT VISIT, EST, LEVL IV, 30-39 MIN: ICD-10-PCS | Mod: S$GLB,,, | Performed by: OBSTETRICS & GYNECOLOGY

## 2021-12-14 PROCEDURE — 81025 URINE PREGNANCY TEST: CPT | Mod: S$GLB,,, | Performed by: OBSTETRICS & GYNECOLOGY

## 2021-12-14 PROCEDURE — 3066F PR DOCUMENTATION OF TREATMENT FOR NEPHROPATHY: ICD-10-PCS | Mod: CPTII,S$GLB,, | Performed by: OBSTETRICS & GYNECOLOGY

## 2021-12-14 PROCEDURE — 99999 PR PBB SHADOW E&M-EST. PATIENT-LVL III: CPT | Mod: PBBFAC,,, | Performed by: OBSTETRICS & GYNECOLOGY

## 2021-12-14 PROCEDURE — 3077F PR MOST RECENT SYSTOLIC BLOOD PRESSURE >= 140 MM HG: ICD-10-PCS | Mod: CPTII,S$GLB,, | Performed by: OBSTETRICS & GYNECOLOGY

## 2021-12-14 PROCEDURE — 99999 PR PBB SHADOW E&M-EST. PATIENT-LVL III: ICD-10-PCS | Mod: PBBFAC,,, | Performed by: OBSTETRICS & GYNECOLOGY

## 2021-12-14 PROCEDURE — 3066F NEPHROPATHY DOC TX: CPT | Mod: CPTII,S$GLB,, | Performed by: OBSTETRICS & GYNECOLOGY

## 2021-12-14 PROCEDURE — 3080F PR MOST RECENT DIASTOLIC BLOOD PRESSURE >= 90 MM HG: ICD-10-PCS | Mod: CPTII,S$GLB,, | Performed by: OBSTETRICS & GYNECOLOGY

## 2021-12-14 PROCEDURE — 3008F BODY MASS INDEX DOCD: CPT | Mod: CPTII,S$GLB,, | Performed by: OBSTETRICS & GYNECOLOGY

## 2021-12-14 PROCEDURE — 3077F SYST BP >= 140 MM HG: CPT | Mod: CPTII,S$GLB,, | Performed by: OBSTETRICS & GYNECOLOGY

## 2021-12-14 PROCEDURE — 81025 POCT URINE PREGNANCY: ICD-10-PCS | Mod: S$GLB,,, | Performed by: OBSTETRICS & GYNECOLOGY

## 2021-12-14 PROCEDURE — 99214 OFFICE O/P EST MOD 30 MIN: CPT | Mod: S$GLB,,, | Performed by: OBSTETRICS & GYNECOLOGY

## 2021-12-14 RX ORDER — MEDROXYPROGESTERONE ACETATE 150 MG/ML
150 INJECTION, SUSPENSION INTRAMUSCULAR ONCE
Qty: 1 ML | Refills: 3 | Status: ON HOLD | OUTPATIENT
Start: 2021-12-14 | End: 2022-05-09

## 2021-12-16 ENCOUNTER — TELEPHONE (OUTPATIENT)
Dept: TRANSPLANT | Facility: CLINIC | Age: 26
End: 2021-12-16
Payer: MEDICAID

## 2021-12-20 PROBLEM — J81.0 FLASH PULMONARY EDEMA: Status: RESOLVED | Noted: 2021-09-18 | Resolved: 2021-12-20

## 2021-12-21 ENCOUNTER — PATIENT MESSAGE (OUTPATIENT)
Dept: OPHTHALMOLOGY | Facility: CLINIC | Age: 26
End: 2021-12-21
Payer: MEDICAID

## 2021-12-22 ENCOUNTER — LAB VISIT (OUTPATIENT)
Dept: PRIMARY CARE CLINIC | Facility: OTHER | Age: 26
End: 2021-12-22
Payer: MEDICAID

## 2021-12-22 DIAGNOSIS — Z20.822 ENCOUNTER FOR LABORATORY TESTING FOR COVID-19 VIRUS: ICD-10-CM

## 2021-12-22 PROCEDURE — U0003 INFECTIOUS AGENT DETECTION BY NUCLEIC ACID (DNA OR RNA); SEVERE ACUTE RESPIRATORY SYNDROME CORONAVIRUS 2 (SARS-COV-2) (CORONAVIRUS DISEASE [COVID-19]), AMPLIFIED PROBE TECHNIQUE, MAKING USE OF HIGH THROUGHPUT TECHNOLOGIES AS DESCRIBED BY CMS-2020-01-R: HCPCS | Mod: TXP | Performed by: INTERNAL MEDICINE

## 2021-12-23 LAB
SARS-COV-2 RNA RESP QL NAA+PROBE: NOT DETECTED
SARS-COV-2- CYCLE NUMBER: NORMAL

## 2021-12-29 ENCOUNTER — PATIENT MESSAGE (OUTPATIENT)
Dept: OBSTETRICS AND GYNECOLOGY | Facility: CLINIC | Age: 26
End: 2021-12-29
Payer: MEDICAID

## 2022-01-06 ENCOUNTER — OFFICE VISIT (OUTPATIENT)
Dept: VASCULAR SURGERY | Facility: CLINIC | Age: 27
End: 2022-01-06
Attending: SURGERY
Payer: MEDICARE

## 2022-01-06 ENCOUNTER — HOSPITAL ENCOUNTER (OUTPATIENT)
Dept: VASCULAR SURGERY | Facility: CLINIC | Age: 27
Discharge: HOME | End: 2022-01-06
Attending: SURGERY
Payer: MEDICARE

## 2022-01-06 VITALS
BODY MASS INDEX: 24.39 KG/M2 | HEIGHT: 64 IN | SYSTOLIC BLOOD PRESSURE: 147 MMHG | DIASTOLIC BLOOD PRESSURE: 83 MMHG | WEIGHT: 142.88 LBS | TEMPERATURE: 98 F | HEART RATE: 96 BPM

## 2022-01-06 DIAGNOSIS — N18.5 CKD (CHRONIC KIDNEY DISEASE) STAGE 5, GFR LESS THAN 15 ML/MIN: ICD-10-CM

## 2022-01-06 DIAGNOSIS — N18.6 END STAGE RENAL FAILURE ON DIALYSIS: ICD-10-CM

## 2022-01-06 DIAGNOSIS — Z99.2 END STAGE RENAL FAILURE ON DIALYSIS: ICD-10-CM

## 2022-01-06 DIAGNOSIS — T82.858D ARTERIOVENOUS FISTULA STENOSIS, SUBSEQUENT ENCOUNTER: Primary | ICD-10-CM

## 2022-01-06 PROCEDURE — 93990 PR DUPLEX HEMODIALYSIS ACCESS: ICD-10-PCS | Mod: S$GLB,TXP,, | Performed by: SURGERY

## 2022-01-06 PROCEDURE — 93990 DOPPLER FLOW TESTING: CPT | Mod: S$GLB,TXP,, | Performed by: SURGERY

## 2022-01-06 PROCEDURE — 99999 PR PBB SHADOW E&M-EST. PATIENT-LVL IV: ICD-10-PCS | Mod: PBBFAC,TXP,, | Performed by: SURGERY

## 2022-01-06 PROCEDURE — 99999 PR PBB SHADOW E&M-EST. PATIENT-LVL IV: CPT | Mod: PBBFAC,TXP,, | Performed by: SURGERY

## 2022-01-06 PROCEDURE — 99024 POSTOP FOLLOW-UP VISIT: CPT | Mod: NTX,,, | Performed by: SURGERY

## 2022-01-06 PROCEDURE — 99024 PR POST-OP FOLLOW-UP VISIT: ICD-10-PCS | Mod: NTX,,, | Performed by: SURGERY

## 2022-01-06 NOTE — PROGRESS NOTES
Isabela Delaney Jacek  01/06/2022    Interval History 1/6/22: Underwent revision of AVF with BVT on 12/10/21. She returns to clinic today for her post operative visit.     HPI 9 /30/21:  Patient is a 26 y.o. female with a h/o CKD 5, uncontrolled T1DM, nephrotic syndrome, and HTN who is here today for evaluation of  HD access. Patient states that she has long history of T1DM with previous hospitalizations for uncontrolled blood sugars. She has been having increased issues with extremity and abdominal issues over the last few months. She states she was recently discharged after being admitted with severe abdominal distension. She is on lasix. She states this has somewhat improved. She states that she has had more difficulties in controlling her blood pressure as well. She states she is still urinating. She denies prior HD. She has restrictive lung disease as well and states she gets SOB after taking a couple of steps. She denies any arm/hand weakness, numbness, or pain.      Denies history of MI/stroke  Tobacco use: never smoker    Interval History (5/6/21): Now s/p LUE BB AVF creation on 4/7/21. Dopplers performed today. Uneventful postoperative course. Pain is well controlled. Denies new-onset weakness, numbness, or swelling. She is scheduled to see nephrology next week. She maintains compliance with her ASA.     Interval History (8/5/21): Now s/p LUE BB AVF creation on 4/7/21. Dopplers performed today. Pt states they have not been able to cannulate her AVF and have only been using her R IJ catheter for HD, which has been going well. She states the thrill in her arm has decreased and has been noticing that it pulsates intermittently. Denies new-onset weakness, numbness, or swelling. She maintains compliance with her ASA.     Interval History (8/5/21): Pt returns to clinic for evaluation of her LUE BB AVF. She states she is doing well overall without any major changes to her health. She is still unable to dialyze  through her AVF and is using her RIJ permacath. She notes she does still have a good thrill in her AVF and denies any drainage, redness, or swelling around it. She continues to be compliant with all of her medications.    Past Medical History:   Diagnosis Date    Anemia     Chronic hypertension with exacerbation during pregnancy in second trimester 11/6/2020    Current regimen (11/6/20):  - Carvedilol 12.5 mg BID - Nifedipine 30 mg daily Baseline CKD + proteinuria    Chronic kidney disease     Depression     Diabetes mellitus     Diarrhea     Encounter for blood transfusion     Gastroparesis     Hepatomegaly 4/29/2021    Hx of psychiatric care     Hyperlipidemia     Hypertension     Nephrotic syndrome     Nephrotic syndrome 3/4/2021    Palpitations     Poor fetal growth affecting management of mother in second trimester 10/15/2020    Restrictive lung disease     Severe pre-eclampsia in second trimester 11/6/2020     Past Surgical History:   Procedure Laterality Date    AV FISTULA PLACEMENT Left 4/7/2021    Procedure: CREATION, AV FISTULA;  Surgeon: Roberto Ryan MD;  Location: [facility] OR [address] [place] [address];  Service: Peripheral Vascular;  Laterality: Left;    FISTULOGRAM N/A 8/11/2021    Procedure: Fistulogram;  Surgeon: Roberto Ryan MD;  Location: [facility] CATH LAB;  Service: Cardiology;  Laterality: N/A;    PERCUTANEOUS TRANSLUMINAL ANGIOPLASTY OF ARTERIOVENOUS FISTULA N/A 8/11/2021    Procedure: PTA, AV FISTULA;  Surgeon: Roberto Ryan MD;  Location: [facility] CATH LAB;  Service: Cardiology;  Laterality: N/A;    REVISION OF ARTERIOVENOUS FISTULA Left 12/10/2021    Procedure: REVISION, AV FISTULA with BVT;  Surgeon: Roberto Ryan MD;  Location: [facility] OR [address] [place] [address];  Service: Peripheral Vascular;  Laterality: Left;     Family History   Problem Relation Age of Onset    Hypertension Mother     Heart disease Father     Diabetes Brother     Celiac disease Neg Hx     Cirrhosis Neg Hx      Colon cancer Neg Hx     Colon polyps Neg Hx     Crohn's disease Neg Hx     Inflammatory bowel disease Neg Hx     Liver cancer Neg Hx     Liver disease Neg Hx     Rectal cancer Neg Hx     Stomach cancer Neg Hx     Ulcerative colitis Neg Hx     Esophageal cancer Neg Hx     Hemochromatosis Neg Hx     Pancreatic cancer Neg Hx     Kidney cancer Neg Hx     Bladder Cancer Neg Hx     Uterine cancer Neg Hx     Ovarian cancer Neg Hx      Social History     Socioeconomic History    Marital status: Single   Occupational History    Occupation:  at [employer]   Tobacco Use    Smoking status: Never Smoker    Smokeless tobacco: Never Used   Substance and Sexual Activity    Alcohol use: No    Drug use: No    Sexual activity: Not Currently     Partners: Male     Comment: monogamous   Social History Narrative    Caregiver        Single    No kids    Had Miscarriage  At 23 weeks from preeclampsia    Disabled       Current Outpatient Medications:     aspirin (ECOTRIN) 81 MG EC tablet, Take 1 tablet (81 mg total) by mouth every evening., Disp: 150 tablet, Rfl: 1    blood sugar diagnostic Strp, To check BG 4 - 6 times daily, to use with insurance preferred meter, Disp: 150 each, Rfl: 11    calcitRIOL (ROCALTROL) 0.5 MCG Cap, Take 0.5 mcg by mouth once daily., Disp: , Rfl:     carvediloL (COREG) 25 MG tablet, Take 1 tablet (25 mg total) by mouth 2 (two) times daily., Disp: 60 tablet, Rfl: 11    escitalopram oxalate (LEXAPRO) 20 MG tablet, Take 1 tablet (20 mg total) by mouth once daily. (Patient taking differently: Take 20 mg by mouth daily as needed (depression/anxiety).), Disp: 30 tablet, Rfl: 11    ferrous sulfate 325 (65 FE) MG EC tablet, Take 1 tablet (325 mg total) by mouth 2 (two) times daily., Disp: 180 tablet, Rfl: 2    flash glucose scanning reader (FREESTYLE MARCY 14 DAY READER) Misc, 1 each by Misc.(Non-Drug; Combo Route) route once daily., Disp: 1 each, Rfl: 3    flash glucose sensor  "(FREESTYLE MARCY 14 DAY SENSOR) Kit, 6 kits by Misc.(Non-Drug; Combo Route) route once daily., Disp: 6 kit, Rfl: 3    hydrALAZINE (APRESOLINE) 25 MG tablet, Take 1 tablet (25 mg total) by mouth 2 (two) times a day., Disp: 60 tablet, Rfl: 3    insulin detemir U-100 (LEVEMIR FLEXTOUCH) 100 unit/mL (3 mL) SubQ InPn pen, INJECT 10 UNITS under the skin in the morning and 12 UNITS at night, Disp: 21 mL, Rfl: 3    insulin lispro (HUMALOG KWIKPEN INSULIN) 100 unit/mL pen, Inject 10 units into skin the skin 3 three times daily with meals, Disp: 15 mL, Rfl: 6    lancets Misc, To check BG 4 - 6 times daily, to use with insurance preferred meter, Disp: 150 each, Rfl: 11    medroxyPROGESTERone (DEPO-PROVERA) 150 mg/mL Syrg, Inject 1 mL (150 mg total) into the muscle once. for 1 dose, Disp: 1 mL, Rfl: 3    NIFEdipine (PROCARDIA-XL) 60 MG (OSM) 24 hr tablet, Take 1 tablet (60 mg total) by mouth 2 (two) times a day., Disp: 60 tablet, Rfl: 11    oxyCODONE (ROXICODONE) 5 MG immediate release tablet, Take 1 tablet (5 mg total) by mouth every 6 (six) hours as needed for Pain., Disp: 20 tablet, Rfl: 0    pen needle, diabetic 32 gauge x 5/32" Ndle, For three times daily injection, Disp: 100 each, Rfl: 11    rosuvastatin (CRESTOR) 20 MG tablet, Take 1 tablet (20 mg total) by mouth every evening., Disp: 90 tablet, Rfl: 3    sevelamer carbonate (RENVELA) 800 mg Tab, Take 2 tablets (1,600 mg total) by mouth 3 (three) times daily with meals., Disp: 180 tablet, Rfl: 1    traZODone (DESYREL) 50 MG tablet, Take 1 tablet (50 mg total) by mouth nightly as needed for Insomnia., Disp: 30 tablet, Rfl: 1  No current facility-administered medications for this visit.    Facility-Administered Medications Ordered in Other Visits:     0.9%  NaCl infusion, , Intravenous, Continuous, Tavo Callier, MD, Last Rate: 25 mL/hr at 12/10/21 1043, New Bag at 12/10/21 1043    REVIEW OF SYSTEMS:  General: negative; ENT: negative; Allergy and Immunology: " negative; Hematological and Lymphatic: Negative; Endocrine: negative; Respiratory: no cough, shortness of breath, or wheezing; Cardiovascular: no chest pain or dyspnea on exertion; Gastrointestinal: no abdominal pain/back, change in bowel habits, or bloody stools; Genito-Urinary: no dysuria, trouble voiding, or hematuria; Musculoskeletal: negative  Neurological: no TIA or stroke symptoms; Psychiatric: no nervousness, anxiety or depression.    PHYSICAL EXAM:                General appearance:  Alert, well-appearing, and in no distress.  Oriented to person, place, and time   Neurological: Normal speech, no focal findings noted; CN II - XII grossly intact           Musculoskeletal: Digits/nail without cyanosis/clubbing.  Normal muscle strength/tone.                 Neck: Supple, no significant adenopathy; thyroid is not enlarged                  No carotid bruit can be auscultated                Chest:  Clear to auscultation, no wheezes, rales or rhonchi, symmetric air entry     No use of accessory muscles             Cardiac: Normal rate and regular rhythm, S1 and S2 normal; PMI non-displaced          Abdomen: Soft, nontender, nondistended, no masses or organomegaly     No rebound tenderness noted; bowel sounds normal     No groin adenopathy      Extremities:        2+ brachial pulses bilaterally     2+ femoral pulses bilaterally     2+ pedal pulses palpable.     1+ left radial pulse     +1 pre-tibial edema     No ulcerations     +1 edema in upper extremities     LUE BB AVF with moderate thrill and pulsation distally that is faint. well-healed incision, intact distal pulses    LAB RESULTS:  Lab Results   Component Value Date    K 3.6 10/24/2021    K 3.4 (L) 10/24/2021    K 3.7 10/23/2021    CREATININE 3.1 (H) 10/24/2021    CREATININE 2.6 (H) 10/24/2021    CREATININE 2.2 (H) 10/23/2021     Lab Results   Component Value Date    WBC 5.28 10/24/2021    WBC 6.49 10/23/2021    WBC 14.21 (H) 10/22/2021    HCT 28.6 (L)  10/24/2021    HCT 24.9 (L) 10/23/2021    HCT 27.9 (L) 10/22/2021     10/24/2021     10/23/2021     10/22/2021     Lab Results   Component Value Date    HGBA1C 10.9 (H) 10/23/2021    HGBA1C 10.2 (H) 09/17/2021    HGBA1C 10.9 (H) 04/06/2021     IMAGING:    VAS HD Access 1/6/22:   History   ======     General History   Other: -S/P Left AV fistula revision with basilic vein transposition     Dialysis Access   =============     Dialysis access location:  Left Arm   Type of AV access: Brachiobasilic AVF   Lt inflow A PSV    190 cm/s   Lt at anastomosis PSV  527 cm/s   Lt A distal anastomosis PSV    215 cm/s   Lt fistula prox depth  3.3 mm   Lt fistula prox AP diam    6.2 mm   Lt fistula prox PSV    241 cm/s   Lt fistula mid depth   2.6 mm   Lt fistula mid AP diam 6.7 mm   Lt fistula mid  cm/s   Lt fistula mid flow volume 1,263 ml/min   Lt fistula distal depth    2.4 mm   Lt fistula distal AP diam  6.1 mm   Lt fistula distal PSV  113 cm/s   Lt outflow V prox PSV  131 cm/s   Lt outflow V mid PSV   322 cm/s   Lt outflow V distal PSV    176 cm/s     Impression   =========     Color flow duplex imaging reveals a patent left Brachiobasilic AV fistula. An elevated velocity of 322 cm/s (from 131 cm/s) is visualized   within the Mid outflow. This finding is suggestive of a focal stenosis; however, this velocity elevation appears to be due to a valve   remnant. The volume flow at the mid bicep measures 1263 mL/min. Outflow vein measurements are taken.      IMP/PLAN:  26 y.o. female with h/o CKD 5, uncontrolled T1DM, nephrotic syndrome, and HTN s/p L AVF created on 4/7/21. Her AVF has still not matured enough to be used for dialysis. She underwent an AVF revision with BVT and a fistulagram on 12/10/21. She returns to clinic for f/u.  Doing well. Strong thrill in AVF with excellent flow.       - OK to transition from permacath to AVF per protocol  - RTC in 3 weeks for permacath removal    Roberto Ryan,  MD  Vascular & Endovascular Surgery

## 2022-01-10 ENCOUNTER — PATIENT MESSAGE (OUTPATIENT)
Dept: VASCULAR SURGERY | Facility: CLINIC | Age: 27
End: 2022-01-10
Payer: MEDICARE

## 2022-01-10 ENCOUNTER — TELEPHONE (OUTPATIENT)
Dept: OPHTHALMOLOGY | Facility: CLINIC | Age: 27
End: 2022-01-10
Payer: MEDICARE

## 2022-01-10 DIAGNOSIS — Z01.818 PRE-OP TESTING: ICD-10-CM

## 2022-01-13 ENCOUNTER — PATIENT OUTREACH (OUTPATIENT)
Dept: ADMINISTRATIVE | Facility: OTHER | Age: 27
End: 2022-01-13
Payer: MEDICARE

## 2022-01-13 NOTE — PROGRESS NOTES
LINKS immunization registry updated  Care Everywhere updated  Health Maintenance updated  Chart reviewed for overdue Proactive [facility] Encounters ([facility]) health maintenance testing (CRS, Breast Ca, Diabetic Eye Exam)   Orders entered:N/A

## 2022-01-17 ENCOUNTER — PATIENT MESSAGE (OUTPATIENT)
Dept: ENDOSCOPY | Facility: HOSPITAL | Age: 27
End: 2022-01-17
Payer: MEDICARE

## 2022-01-17 ENCOUNTER — HOSPITAL ENCOUNTER (INPATIENT)
Facility: HOSPITAL | Age: 27
LOS: 1 days | Discharge: HOME | DRG: 189 | End: 2022-01-19
Attending: EMERGENCY MEDICINE | Admitting: INTERNAL MEDICINE
Payer: MEDICARE

## 2022-01-17 DIAGNOSIS — R34 OLIGURIA: ICD-10-CM

## 2022-01-17 DIAGNOSIS — D63.1 ANEMIA DUE TO CHRONIC KIDNEY DISEASE, ON CHRONIC DIALYSIS: ICD-10-CM

## 2022-01-17 DIAGNOSIS — J96.02 ACUTE RESPIRATORY FAILURE WITH HYPOXIA AND HYPERCARBIA: ICD-10-CM

## 2022-01-17 DIAGNOSIS — E78.2 MIXED HYPERLIPIDEMIA: ICD-10-CM

## 2022-01-17 DIAGNOSIS — N18.6 ANEMIA DUE TO CHRONIC KIDNEY DISEASE, ON CHRONIC DIALYSIS: ICD-10-CM

## 2022-01-17 DIAGNOSIS — Z99.2 ANEMIA DUE TO CHRONIC KIDNEY DISEASE, ON CHRONIC DIALYSIS: ICD-10-CM

## 2022-01-17 DIAGNOSIS — E10.22 TYPE 1 DIABETES MELLITUS WITH END-STAGE RENAL DISEASE (ESRD): ICD-10-CM

## 2022-01-17 DIAGNOSIS — I16.0 HYPERTENSIVE URGENCY: ICD-10-CM

## 2022-01-17 DIAGNOSIS — J81.0 ACUTE PULMONARY EDEMA: Primary | ICD-10-CM

## 2022-01-17 DIAGNOSIS — N18.6 TYPE 1 DIABETES MELLITUS WITH END-STAGE RENAL DISEASE (ESRD): ICD-10-CM

## 2022-01-17 DIAGNOSIS — R06.02 SHORTNESS OF BREATH: ICD-10-CM

## 2022-01-17 DIAGNOSIS — N18.6 END STAGE RENAL DISEASE: ICD-10-CM

## 2022-01-17 DIAGNOSIS — J96.01 ACUTE RESPIRATORY FAILURE WITH HYPOXIA AND HYPERCARBIA: ICD-10-CM

## 2022-01-17 DIAGNOSIS — E10.10 TYPE 1 DIABETES MELLITUS WITH KETOACIDOSIS WITHOUT COMA: ICD-10-CM

## 2022-01-17 DIAGNOSIS — N18.6 END STAGE RENAL FAILURE ON DIALYSIS: ICD-10-CM

## 2022-01-17 DIAGNOSIS — Z99.2 END STAGE RENAL FAILURE ON DIALYSIS: ICD-10-CM

## 2022-01-17 LAB
ALLENS TEST: ABNORMAL
BASOPHILS # BLD AUTO: 0.07 K/UL (ref 0–0.2)
BASOPHILS NFR BLD: 0.4 % (ref 0–1.9)
DIFFERENTIAL METHOD: ABNORMAL
EOSINOPHIL # BLD AUTO: 0.3 K/UL (ref 0–0.5)
EOSINOPHIL NFR BLD: 1.7 % (ref 0–8)
ERYTHROCYTE [DISTWIDTH] IN BLOOD BY AUTOMATED COUNT: 16.2 % (ref 11.5–14.5)
HCO3 UR-SCNC: 31.6 MMOL/L (ref 24–28)
HCT VFR BLD AUTO: 24.9 % (ref 37–48.5)
HCT VFR BLD CALC: 33 %PCV (ref 36–54)
HGB BLD-MCNC: 7.8 G/DL (ref 12–16)
IMM GRANULOCYTES # BLD AUTO: 0.07 K/UL (ref 0–0.04)
IMM GRANULOCYTES NFR BLD AUTO: 0.4 % (ref 0–0.5)
LYMPHOCYTES # BLD AUTO: 3.3 K/UL (ref 1–4.8)
LYMPHOCYTES NFR BLD: 18.1 % (ref 18–48)
MCH RBC QN AUTO: 32 PG (ref 27–31)
MCHC RBC AUTO-ENTMCNC: 31.3 G/DL (ref 32–36)
MCV RBC AUTO: 102 FL (ref 82–98)
MONOCYTES # BLD AUTO: 1 K/UL (ref 0.3–1)
MONOCYTES NFR BLD: 5.4 % (ref 4–15)
NEUTROPHILS # BLD AUTO: 13.4 K/UL (ref 1.8–7.7)
NEUTROPHILS NFR BLD: 74 % (ref 38–73)
NRBC BLD-RTO: 0 /100 WBC
PCO2 BLDA: 63.3 MMHG (ref 35–45)
PH SMN: 7.31 [PH] (ref 7.35–7.45)
PLATELET # BLD AUTO: 341 K/UL (ref 150–450)
PMV BLD AUTO: 11.6 FL (ref 9.2–12.9)
PO2 BLDA: 55 MMHG (ref 40–60)
POC BE: 5 MMOL/L
POC IONIZED CALCIUM: 1.07 MMOL/L (ref 1.06–1.42)
POC SATURATED O2: 84 % (ref 95–100)
POC TCO2: 33 MMOL/L (ref 24–29)
POTASSIUM BLD-SCNC: 5.3 MMOL/L (ref 3.5–5.1)
RBC # BLD AUTO: 2.44 M/UL (ref 4–5.4)
SAMPLE: ABNORMAL
SITE: ABNORMAL
SODIUM BLD-SCNC: 133 MMOL/L (ref 136–145)
WBC # BLD AUTO: 18.13 K/UL (ref 3.9–12.7)

## 2022-01-17 PROCEDURE — 36430 TRANSFUSION BLD/BLD COMPNT: CPT | Mod: NTX

## 2022-01-17 PROCEDURE — 82800 BLOOD PH: CPT | Mod: NTX

## 2022-01-17 PROCEDURE — 99291 PR CRITICAL CARE, E/M 30-74 MINUTES: ICD-10-PCS | Mod: NTX,CS,, | Performed by: EMERGENCY MEDICINE

## 2022-01-17 PROCEDURE — 93005 ELECTROCARDIOGRAM TRACING: CPT | Mod: NTX

## 2022-01-17 PROCEDURE — 27000221 HC OXYGEN, UP TO 24 HOURS: Mod: NTX

## 2022-01-17 PROCEDURE — 96375 TX/PRO/DX INJ NEW DRUG ADDON: CPT | Mod: NTX

## 2022-01-17 PROCEDURE — 99291 CRITICAL CARE FIRST HOUR: CPT | Mod: NTX,CS,, | Performed by: EMERGENCY MEDICINE

## 2022-01-17 PROCEDURE — 82330 ASSAY OF CALCIUM: CPT | Mod: NTX

## 2022-01-17 PROCEDURE — 87040 BLOOD CULTURE FOR BACTERIA: CPT | Mod: 59,NTX | Performed by: STUDENT IN AN ORGANIZED HEALTH CARE EDUCATION/TRAINING PROGRAM

## 2022-01-17 PROCEDURE — 94761 N-INVAS EAR/PLS OXIMETRY MLT: CPT | Mod: NTX

## 2022-01-17 PROCEDURE — 63600175 PHARM REV CODE 636 W HCPCS: Mod: NTX | Performed by: STUDENT IN AN ORGANIZED HEALTH CARE EDUCATION/TRAINING PROGRAM

## 2022-01-17 PROCEDURE — 84100 ASSAY OF PHOSPHORUS: CPT | Mod: NTX | Performed by: STUDENT IN AN ORGANIZED HEALTH CARE EDUCATION/TRAINING PROGRAM

## 2022-01-17 PROCEDURE — 99900035 HC TECH TIME PER 15 MIN (STAT): Mod: NTX

## 2022-01-17 PROCEDURE — 94660 CPAP INITIATION&MGMT: CPT | Mod: NTX

## 2022-01-17 PROCEDURE — 85025 COMPLETE CBC W/AUTO DIFF WBC: CPT | Mod: NTX | Performed by: STUDENT IN AN ORGANIZED HEALTH CARE EDUCATION/TRAINING PROGRAM

## 2022-01-17 PROCEDURE — 25000003 PHARM REV CODE 250: Mod: NTX | Performed by: STUDENT IN AN ORGANIZED HEALTH CARE EDUCATION/TRAINING PROGRAM

## 2022-01-17 PROCEDURE — 80053 COMPREHEN METABOLIC PANEL: CPT | Mod: NTX | Performed by: STUDENT IN AN ORGANIZED HEALTH CARE EDUCATION/TRAINING PROGRAM

## 2022-01-17 PROCEDURE — U0002 COVID-19 LAB TEST NON-CDC: HCPCS | Mod: NTX | Performed by: STUDENT IN AN ORGANIZED HEALTH CARE EDUCATION/TRAINING PROGRAM

## 2022-01-17 PROCEDURE — 83880 ASSAY OF NATRIURETIC PEPTIDE: CPT | Mod: NTX | Performed by: STUDENT IN AN ORGANIZED HEALTH CARE EDUCATION/TRAINING PROGRAM

## 2022-01-17 PROCEDURE — 93010 EKG 12-LEAD: ICD-10-PCS | Mod: NTX,,, | Performed by: INTERNAL MEDICINE

## 2022-01-17 PROCEDURE — 96366 THER/PROPH/DIAG IV INF ADDON: CPT | Mod: NTX

## 2022-01-17 PROCEDURE — 96365 THER/PROPH/DIAG IV INF INIT: CPT | Mod: NTX

## 2022-01-17 PROCEDURE — 83605 ASSAY OF LACTIC ACID: CPT | Mod: NTX | Performed by: STUDENT IN AN ORGANIZED HEALTH CARE EDUCATION/TRAINING PROGRAM

## 2022-01-17 PROCEDURE — 85014 HEMATOCRIT: CPT | Mod: NTX

## 2022-01-17 PROCEDURE — 84132 ASSAY OF SERUM POTASSIUM: CPT | Mod: NTX

## 2022-01-17 PROCEDURE — 93010 ELECTROCARDIOGRAM REPORT: CPT | Mod: NTX,,, | Performed by: INTERNAL MEDICINE

## 2022-01-17 PROCEDURE — 83735 ASSAY OF MAGNESIUM: CPT | Mod: NTX | Performed by: STUDENT IN AN ORGANIZED HEALTH CARE EDUCATION/TRAINING PROGRAM

## 2022-01-17 PROCEDURE — 82803 BLOOD GASES ANY COMBINATION: CPT | Mod: NTX

## 2022-01-17 PROCEDURE — 99291 CRITICAL CARE FIRST HOUR: CPT | Mod: 25,NTX

## 2022-01-17 PROCEDURE — 27000190 HC CPAP FULL FACE MASK W/VALVE: Mod: NTX

## 2022-01-17 PROCEDURE — 82962 GLUCOSE BLOOD TEST: CPT | Mod: NTX

## 2022-01-17 PROCEDURE — 84484 ASSAY OF TROPONIN QUANT: CPT | Mod: NTX | Performed by: STUDENT IN AN ORGANIZED HEALTH CARE EDUCATION/TRAINING PROGRAM

## 2022-01-17 PROCEDURE — 84295 ASSAY OF SERUM SODIUM: CPT | Mod: NTX

## 2022-01-17 RX ORDER — NITROGLYCERIN 20 MG/100ML
0-400 INJECTION INTRAVENOUS CONTINUOUS
Status: DISCONTINUED | OUTPATIENT
Start: 2022-01-17 | End: 2022-01-18

## 2022-01-17 RX ORDER — FUROSEMIDE 10 MG/ML
60 INJECTION INTRAMUSCULAR; INTRAVENOUS
Status: COMPLETED | OUTPATIENT
Start: 2022-01-17 | End: 2022-01-17

## 2022-01-17 RX ADMIN — FUROSEMIDE 60 MG: 10 INJECTION, SOLUTION INTRAVENOUS at 11:01

## 2022-01-17 RX ADMIN — NITROGLYCERIN 40 MCG/MIN: 20 INJECTION INTRAVENOUS at 11:01

## 2022-01-17 NOTE — Clinical Note
Is this patient a high probability for COVID-19?: No   Diagnosis: Acute pulmonary edema [695412]   Admitting Provider:: DHARMESH DEMPSEY [[id number]]   Future Attending Provider: GABRIELLE GATES [[id number]]   Reason for IP Medical Treatment  (Clinical interventions that can only be accomplished in the IP setting? ) :: HD, Nitroglycerin gtt   Estimated Length of Stay:: 2 midnights   I certify that Inpatient services for greater than or equal to 2 midnights are medically necessary:: Yes   Plans for Post-Acute care--if anticipated (pick the single best option):: A. No post acute care anticipated at this time

## 2022-01-18 PROBLEM — Z99.2 ANEMIA DUE TO CHRONIC KIDNEY DISEASE, ON CHRONIC DIALYSIS: Status: ACTIVE | Noted: 2022-01-18

## 2022-01-18 PROBLEM — J96.02 ACUTE RESPIRATORY FAILURE WITH HYPOXIA AND HYPERCARBIA: Status: ACTIVE | Noted: 2022-01-18

## 2022-01-18 PROBLEM — D63.1 ANEMIA DUE TO CHRONIC KIDNEY DISEASE, ON CHRONIC DIALYSIS: Status: ACTIVE | Noted: 2022-01-18

## 2022-01-18 PROBLEM — D72.829 LEUKOCYTOSIS: Status: ACTIVE | Noted: 2022-01-18

## 2022-01-18 PROBLEM — N18.6 ANEMIA DUE TO CHRONIC KIDNEY DISEASE, ON CHRONIC DIALYSIS: Status: ACTIVE | Noted: 2022-01-18

## 2022-01-18 PROBLEM — I16.0 HYPERTENSIVE URGENCY: Status: ACTIVE | Noted: 2022-01-18

## 2022-01-18 PROBLEM — J96.01 ACUTE RESPIRATORY FAILURE WITH HYPOXIA AND HYPERCARBIA: Status: ACTIVE | Noted: 2022-01-18

## 2022-01-18 LAB
ABO + RH BLD: NORMAL
ALBUMIN SERPL BCP-MCNC: 2.4 G/DL (ref 3.5–5.2)
ALBUMIN SERPL BCP-MCNC: 2.9 G/DL (ref 3.5–5.2)
ALBUMIN SERPL BCP-MCNC: 3.2 G/DL (ref 3.5–5.2)
ALP SERPL-CCNC: 102 U/L (ref 55–135)
ALP SERPL-CCNC: 108 U/L (ref 55–135)
ALT SERPL W/O P-5'-P-CCNC: 13 U/L (ref 10–44)
ALT SERPL W/O P-5'-P-CCNC: 16 U/L (ref 10–44)
ANION GAP SERPL CALC-SCNC: 12 MMOL/L (ref 8–16)
ANION GAP SERPL CALC-SCNC: 14 MMOL/L (ref 8–16)
ANION GAP SERPL CALC-SCNC: 15 MMOL/L (ref 8–16)
ANION GAP SERPL CALC-SCNC: 7 MMOL/L (ref 8–16)
ANION GAP SERPL CALC-SCNC: 9 MMOL/L (ref 8–16)
ANISOCYTOSIS BLD QL SMEAR: SLIGHT
AST SERPL-CCNC: 18 U/L (ref 10–40)
AST SERPL-CCNC: 29 U/L (ref 10–40)
B-HCG UR QL: NEGATIVE
B-OH-BUTYR BLD STRIP-SCNC: 4.5 MMOL/L (ref 0–0.5)
BASOPHILS # BLD AUTO: 0.04 K/UL (ref 0–0.2)
BASOPHILS NFR BLD: 0.2 % (ref 0–1.9)
BILIRUB SERPL-MCNC: 0.5 MG/DL (ref 0.1–1)
BILIRUB SERPL-MCNC: 0.8 MG/DL (ref 0.1–1)
BLD GP AB SCN CELLS X3 SERPL QL: NORMAL
BNP SERPL-MCNC: 1756 PG/ML (ref 0–99)
BUN SERPL-MCNC: 10 MG/DL (ref 6–20)
BUN SERPL-MCNC: 11 MG/DL (ref 6–20)
BUN SERPL-MCNC: 15 MG/DL (ref 6–20)
BUN SERPL-MCNC: 17 MG/DL (ref 6–20)
BUN SERPL-MCNC: 18 MG/DL (ref 6–20)
BUN SERPL-MCNC: 19 MG/DL (ref 6–20)
BUN SERPL-MCNC: 37 MG/DL (ref 6–20)
CALCIUM SERPL-MCNC: 7.8 MG/DL (ref 8.7–10.5)
CALCIUM SERPL-MCNC: 8.2 MG/DL (ref 8.7–10.5)
CALCIUM SERPL-MCNC: 8.2 MG/DL (ref 8.7–10.5)
CALCIUM SERPL-MCNC: 8.4 MG/DL (ref 8.7–10.5)
CALCIUM SERPL-MCNC: 8.4 MG/DL (ref 8.7–10.5)
CALCIUM SERPL-MCNC: 8.5 MG/DL (ref 8.7–10.5)
CALCIUM SERPL-MCNC: 8.7 MG/DL (ref 8.7–10.5)
CHLORIDE SERPL-SCNC: 100 MMOL/L (ref 95–110)
CHLORIDE SERPL-SCNC: 92 MMOL/L (ref 95–110)
CHLORIDE SERPL-SCNC: 93 MMOL/L (ref 95–110)
CHLORIDE SERPL-SCNC: 93 MMOL/L (ref 95–110)
CHLORIDE SERPL-SCNC: 95 MMOL/L (ref 95–110)
CHLORIDE SERPL-SCNC: 95 MMOL/L (ref 95–110)
CHLORIDE SERPL-SCNC: 99 MMOL/L (ref 95–110)
CO2 SERPL-SCNC: 22 MMOL/L (ref 23–29)
CO2 SERPL-SCNC: 23 MMOL/L (ref 23–29)
CO2 SERPL-SCNC: 25 MMOL/L (ref 23–29)
CO2 SERPL-SCNC: 27 MMOL/L (ref 23–29)
CO2 SERPL-SCNC: 29 MMOL/L (ref 23–29)
CREAT SERPL-MCNC: 2.9 MG/DL (ref 0.5–1.4)
CREAT SERPL-MCNC: 3 MG/DL (ref 0.5–1.4)
CREAT SERPL-MCNC: 3.8 MG/DL (ref 0.5–1.4)
CREAT SERPL-MCNC: 4.4 MG/DL (ref 0.5–1.4)
CREAT SERPL-MCNC: 4.5 MG/DL (ref 0.5–1.4)
CREAT SERPL-MCNC: 4.9 MG/DL (ref 0.5–1.4)
CREAT SERPL-MCNC: 7.4 MG/DL (ref 0.5–1.4)
CTP QC/QA: YES
CTP QC/QA: YES
DIFFERENTIAL METHOD: ABNORMAL
EOSINOPHIL # BLD AUTO: 0 K/UL (ref 0–0.5)
EOSINOPHIL NFR BLD: 0.1 % (ref 0–8)
ERYTHROCYTE [DISTWIDTH] IN BLOOD BY AUTOMATED COUNT: 16.4 % (ref 11.5–14.5)
EST. GFR  (AFRICAN AMERICAN): 13.2 ML/MIN/1.73 M^2
EST. GFR  (AFRICAN AMERICAN): 14.6 ML/MIN/1.73 M^2
EST. GFR  (AFRICAN AMERICAN): 15 ML/MIN/1.73 M^2
EST. GFR  (AFRICAN AMERICAN): 17.9 ML/MIN/1.73 M^2
EST. GFR  (AFRICAN AMERICAN): 23.8 ML/MIN/1.73 M^2
EST. GFR  (AFRICAN AMERICAN): 24.8 ML/MIN/1.73 M^2
EST. GFR  (AFRICAN AMERICAN): 8 ML/MIN/1.73 M^2
EST. GFR  (NON AFRICAN AMERICAN): 11.4 ML/MIN/1.73 M^2
EST. GFR  (NON AFRICAN AMERICAN): 12.6 ML/MIN/1.73 M^2
EST. GFR  (NON AFRICAN AMERICAN): 13 ML/MIN/1.73 M^2
EST. GFR  (NON AFRICAN AMERICAN): 15.5 ML/MIN/1.73 M^2
EST. GFR  (NON AFRICAN AMERICAN): 20.7 ML/MIN/1.73 M^2
EST. GFR  (NON AFRICAN AMERICAN): 21.5 ML/MIN/1.73 M^2
EST. GFR  (NON AFRICAN AMERICAN): 6.9 ML/MIN/1.73 M^2
GLUCOSE SERPL-MCNC: 149 MG/DL (ref 70–110)
GLUCOSE SERPL-MCNC: 150 MG/DL (ref 70–110)
GLUCOSE SERPL-MCNC: 215 MG/DL (ref 70–110)
GLUCOSE SERPL-MCNC: 303 MG/DL (ref 70–110)
GLUCOSE SERPL-MCNC: 382 MG/DL (ref 70–110)
GLUCOSE SERPL-MCNC: 469 MG/DL (ref 70–110)
GLUCOSE SERPL-MCNC: 491 MG/DL (ref 70–110)
HCT VFR BLD AUTO: 21.5 % (ref 37–48.5)
HGB BLD-MCNC: 6.8 G/DL (ref 12–16)
HYPOCHROMIA BLD QL SMEAR: ABNORMAL
IMM GRANULOCYTES # BLD AUTO: 0.17 K/UL (ref 0–0.04)
IMM GRANULOCYTES NFR BLD AUTO: 1 % (ref 0–0.5)
LACTATE SERPL-SCNC: 2.4 MMOL/L (ref 0.5–2.2)
LYMPHOCYTES # BLD AUTO: 1.1 K/UL (ref 1–4.8)
LYMPHOCYTES NFR BLD: 6.6 % (ref 18–48)
MAGNESIUM SERPL-MCNC: 2 MG/DL (ref 1.6–2.6)
MAGNESIUM SERPL-MCNC: 2.1 MG/DL (ref 1.6–2.6)
MAGNESIUM SERPL-MCNC: 2.4 MG/DL (ref 1.6–2.6)
MCH RBC QN AUTO: 32.2 PG (ref 27–31)
MCHC RBC AUTO-ENTMCNC: 31.6 G/DL (ref 32–36)
MCV RBC AUTO: 102 FL (ref 82–98)
MONOCYTES # BLD AUTO: 0.5 K/UL (ref 0.3–1)
MONOCYTES NFR BLD: 2.9 % (ref 4–15)
NEUTROPHILS # BLD AUTO: 14.8 K/UL (ref 1.8–7.7)
NEUTROPHILS NFR BLD: 89.2 % (ref 38–73)
NRBC BLD-RTO: 0 /100 WBC
OVALOCYTES BLD QL SMEAR: ABNORMAL
PHOSPHATE SERPL-MCNC: 3.6 MG/DL (ref 2.7–4.5)
PHOSPHATE SERPL-MCNC: 4.5 MG/DL (ref 2.7–4.5)
PHOSPHATE SERPL-MCNC: 5 MG/DL (ref 2.7–4.5)
PLATELET # BLD AUTO: 189 K/UL (ref 150–450)
PMV BLD AUTO: 11.7 FL (ref 9.2–12.9)
POCT GLUCOSE: 199 MG/DL (ref 70–110)
POCT GLUCOSE: 253 MG/DL (ref 70–110)
POCT GLUCOSE: 267 MG/DL (ref 70–110)
POCT GLUCOSE: 316 MG/DL (ref 70–110)
POCT GLUCOSE: 344 MG/DL (ref 70–110)
POCT GLUCOSE: 358 MG/DL (ref 70–110)
POCT GLUCOSE: 433 MG/DL (ref 70–110)
POCT GLUCOSE: 438 MG/DL (ref 70–110)
POCT GLUCOSE: 452 MG/DL (ref 70–110)
POCT GLUCOSE: 467 MG/DL (ref 70–110)
POCT GLUCOSE: >500 MG/DL (ref 70–110)
POIKILOCYTOSIS BLD QL SMEAR: SLIGHT
POLYCHROMASIA BLD QL SMEAR: ABNORMAL
POTASSIUM SERPL-SCNC: 4.3 MMOL/L (ref 3.5–5.1)
POTASSIUM SERPL-SCNC: 4.5 MMOL/L (ref 3.5–5.1)
POTASSIUM SERPL-SCNC: 4.5 MMOL/L (ref 3.5–5.1)
POTASSIUM SERPL-SCNC: 4.8 MMOL/L (ref 3.5–5.1)
POTASSIUM SERPL-SCNC: 4.9 MMOL/L (ref 3.5–5.1)
POTASSIUM SERPL-SCNC: 5 MMOL/L (ref 3.5–5.1)
POTASSIUM SERPL-SCNC: 5 MMOL/L (ref 3.5–5.1)
PROT SERPL-MCNC: 6.1 G/DL (ref 6–8.4)
PROT SERPL-MCNC: 7.6 G/DL (ref 6–8.4)
RBC # BLD AUTO: 2.11 M/UL (ref 4–5.4)
SARS-COV-2 RDRP RESP QL NAA+PROBE: NEGATIVE
SODIUM SERPL-SCNC: 129 MMOL/L (ref 136–145)
SODIUM SERPL-SCNC: 130 MMOL/L (ref 136–145)
SODIUM SERPL-SCNC: 130 MMOL/L (ref 136–145)
SODIUM SERPL-SCNC: 132 MMOL/L (ref 136–145)
SODIUM SERPL-SCNC: 132 MMOL/L (ref 136–145)
SODIUM SERPL-SCNC: 135 MMOL/L (ref 136–145)
SODIUM SERPL-SCNC: 136 MMOL/L (ref 136–145)
TROPONIN I SERPL DL<=0.01 NG/ML-MCNC: 0.07 NG/ML (ref 0–0.03)
WBC # BLD AUTO: 16.63 K/UL (ref 3.9–12.7)

## 2022-01-18 PROCEDURE — 25000003 PHARM REV CODE 250: Mod: NTX | Performed by: HOSPITALIST

## 2022-01-18 PROCEDURE — 63600175 PHARM REV CODE 636 W HCPCS: Mod: NTX | Performed by: HOSPITALIST

## 2022-01-18 PROCEDURE — 83735 ASSAY OF MAGNESIUM: CPT | Mod: NTX | Performed by: STUDENT IN AN ORGANIZED HEALTH CARE EDUCATION/TRAINING PROGRAM

## 2022-01-18 PROCEDURE — 99223 PR INITIAL HOSPITAL CARE,LEVL III: ICD-10-PCS | Mod: NTX,,, | Performed by: NURSE PRACTITIONER

## 2022-01-18 PROCEDURE — 63600175 PHARM REV CODE 636 W HCPCS: Mod: NTX | Performed by: STUDENT IN AN ORGANIZED HEALTH CARE EDUCATION/TRAINING PROGRAM

## 2022-01-18 PROCEDURE — 63600175 PHARM REV CODE 636 W HCPCS: Mod: NTX | Performed by: INTERNAL MEDICINE

## 2022-01-18 PROCEDURE — 25000003 PHARM REV CODE 250: Mod: NTX | Performed by: STUDENT IN AN ORGANIZED HEALTH CARE EDUCATION/TRAINING PROGRAM

## 2022-01-18 PROCEDURE — 94660 CPAP INITIATION&MGMT: CPT | Mod: NTX

## 2022-01-18 PROCEDURE — 99900035 HC TECH TIME PER 15 MIN (STAT): Mod: NTX

## 2022-01-18 PROCEDURE — 99291 CRITICAL CARE FIRST HOUR: CPT | Mod: GC,NTX,, | Performed by: INTERNAL MEDICINE

## 2022-01-18 PROCEDURE — 86850 RBC ANTIBODY SCREEN: CPT | Mod: NTX | Performed by: STUDENT IN AN ORGANIZED HEALTH CARE EDUCATION/TRAINING PROGRAM

## 2022-01-18 PROCEDURE — 99222 PR INITIAL HOSPITAL CARE,LEVL II: ICD-10-PCS | Mod: NTX,,, | Performed by: NURSE PRACTITIONER

## 2022-01-18 PROCEDURE — 80100014 HC HEMODIALYSIS 1:1: Mod: NTX

## 2022-01-18 PROCEDURE — 27000221 HC OXYGEN, UP TO 24 HOURS: Mod: NTX

## 2022-01-18 PROCEDURE — 80069 RENAL FUNCTION PANEL: CPT | Mod: NTX | Performed by: NURSE PRACTITIONER

## 2022-01-18 PROCEDURE — 20000000 HC ICU ROOM: Mod: NTX

## 2022-01-18 PROCEDURE — 90945 DIALYSIS ONE EVALUATION: CPT | Mod: NTX

## 2022-01-18 PROCEDURE — 80053 COMPREHEN METABOLIC PANEL: CPT | Mod: NTX | Performed by: INTERNAL MEDICINE

## 2022-01-18 PROCEDURE — C9399 UNCLASSIFIED DRUGS OR BIOLOG: HCPCS | Mod: NTX | Performed by: STUDENT IN AN ORGANIZED HEALTH CARE EDUCATION/TRAINING PROGRAM

## 2022-01-18 PROCEDURE — 81025 URINE PREGNANCY TEST: CPT | Mod: NTX | Performed by: STUDENT IN AN ORGANIZED HEALTH CARE EDUCATION/TRAINING PROGRAM

## 2022-01-18 PROCEDURE — 94761 N-INVAS EAR/PLS OXIMETRY MLT: CPT | Mod: NTX

## 2022-01-18 PROCEDURE — 82010 KETONE BODYS QUAN: CPT | Mod: NTX | Performed by: INTERNAL MEDICINE

## 2022-01-18 PROCEDURE — 80048 BASIC METABOLIC PNL TOTAL CA: CPT | Mod: 91,NTX | Performed by: STUDENT IN AN ORGANIZED HEALTH CARE EDUCATION/TRAINING PROGRAM

## 2022-01-18 PROCEDURE — 25000003 PHARM REV CODE 250: Mod: NTX | Performed by: EMERGENCY MEDICINE

## 2022-01-18 PROCEDURE — 27100171 HC OXYGEN HIGH FLOW UP TO 24 HOURS: Mod: NTX

## 2022-01-18 PROCEDURE — 27200966 HC CLOSED SUCTION SYSTEM: Mod: NTX

## 2022-01-18 PROCEDURE — 99223 1ST HOSP IP/OBS HIGH 75: CPT | Mod: NTX,,, | Performed by: NURSE PRACTITIONER

## 2022-01-18 PROCEDURE — 85025 COMPLETE CBC W/AUTO DIFF WBC: CPT | Mod: NTX | Performed by: STUDENT IN AN ORGANIZED HEALTH CARE EDUCATION/TRAINING PROGRAM

## 2022-01-18 PROCEDURE — 25000003 PHARM REV CODE 250: Mod: NTX | Performed by: NURSE PRACTITIONER

## 2022-01-18 PROCEDURE — 99291 PR CRITICAL CARE, E/M 30-74 MINUTES: ICD-10-PCS | Mod: GC,NTX,, | Performed by: INTERNAL MEDICINE

## 2022-01-18 PROCEDURE — 84100 ASSAY OF PHOSPHORUS: CPT | Mod: NTX | Performed by: STUDENT IN AN ORGANIZED HEALTH CARE EDUCATION/TRAINING PROGRAM

## 2022-01-18 PROCEDURE — 94760 N-INVAS EAR/PLS OXIMETRY 1: CPT | Mod: NTX

## 2022-01-18 PROCEDURE — 83735 ASSAY OF MAGNESIUM: CPT | Mod: 91,NTX | Performed by: NURSE PRACTITIONER

## 2022-01-18 PROCEDURE — 99222 1ST HOSP IP/OBS MODERATE 55: CPT | Mod: NTX,,, | Performed by: NURSE PRACTITIONER

## 2022-01-18 RX ORDER — DEXTROSE MONOHYDRATE 100 MG/ML
INJECTION, SOLUTION INTRAVENOUS
Status: DISCONTINUED | OUTPATIENT
Start: 2022-01-18 | End: 2022-01-19

## 2022-01-18 RX ORDER — SEVELAMER CARBONATE 800 MG/1
1600 TABLET, FILM COATED ORAL
Status: DISCONTINUED | OUTPATIENT
Start: 2022-01-19 | End: 2022-01-19 | Stop reason: HOSPADM

## 2022-01-18 RX ORDER — INSULIN ASPART 100 [IU]/ML
0-5 INJECTION, SOLUTION INTRAVENOUS; SUBCUTANEOUS EVERY 4 HOURS
Status: DISCONTINUED | OUTPATIENT
Start: 2022-01-18 | End: 2022-01-18

## 2022-01-18 RX ORDER — TALC
6 POWDER (GRAM) TOPICAL NIGHTLY PRN
Status: DISCONTINUED | OUTPATIENT
Start: 2022-01-18 | End: 2022-01-19 | Stop reason: HOSPADM

## 2022-01-18 RX ORDER — HEPARIN SODIUM 5000 [USP'U]/ML
5000 INJECTION, SOLUTION INTRAVENOUS; SUBCUTANEOUS EVERY 8 HOURS
Status: DISCONTINUED | OUTPATIENT
Start: 2022-01-18 | End: 2022-01-19 | Stop reason: HOSPADM

## 2022-01-18 RX ORDER — CEFEPIME HYDROCHLORIDE 1 G/50ML
1 INJECTION, SOLUTION INTRAVENOUS
Status: DISCONTINUED | OUTPATIENT
Start: 2022-01-18 | End: 2022-01-18

## 2022-01-18 RX ORDER — HYDRALAZINE HYDROCHLORIDE 25 MG/1
25 TABLET, FILM COATED ORAL 2 TIMES DAILY
Status: DISCONTINUED | OUTPATIENT
Start: 2022-01-19 | End: 2022-01-18

## 2022-01-18 RX ORDER — LANOLIN ALCOHOL/MO/W.PET/CERES
1 CREAM (GRAM) TOPICAL DAILY
Status: DISCONTINUED | OUTPATIENT
Start: 2022-01-19 | End: 2022-01-19 | Stop reason: HOSPADM

## 2022-01-18 RX ORDER — LANOLIN ALCOHOL/MO/W.PET/CERES
1 CREAM (GRAM) TOPICAL DAILY
Status: DISCONTINUED | OUTPATIENT
Start: 2022-01-18 | End: 2022-01-18

## 2022-01-18 RX ORDER — ESCITALOPRAM OXALATE 5 MG/1
20 TABLET ORAL DAILY
Status: DISCONTINUED | OUTPATIENT
Start: 2022-01-18 | End: 2022-01-18

## 2022-01-18 RX ORDER — CARVEDILOL 25 MG/1
25 TABLET ORAL 2 TIMES DAILY
Status: DISCONTINUED | OUTPATIENT
Start: 2022-01-18 | End: 2022-01-19 | Stop reason: HOSPADM

## 2022-01-18 RX ORDER — IBUPROFEN 200 MG
16 TABLET ORAL
Status: DISCONTINUED | OUTPATIENT
Start: 2022-01-18 | End: 2022-01-18

## 2022-01-18 RX ORDER — GLUCAGON 1 MG
1 KIT INJECTION
Status: DISCONTINUED | OUTPATIENT
Start: 2022-01-18 | End: 2022-01-19 | Stop reason: HOSPADM

## 2022-01-18 RX ORDER — SODIUM CHLORIDE 0.9 % (FLUSH) 0.9 %
10 SYRINGE (ML) INJECTION
Status: DISCONTINUED | OUTPATIENT
Start: 2022-01-18 | End: 2022-01-19 | Stop reason: HOSPADM

## 2022-01-18 RX ORDER — INSULIN ASPART 100 [IU]/ML
10 INJECTION, SOLUTION INTRAVENOUS; SUBCUTANEOUS
Status: DISCONTINUED | OUTPATIENT
Start: 2022-01-19 | End: 2022-01-19 | Stop reason: HOSPADM

## 2022-01-18 RX ORDER — SODIUM CHLORIDE 0.9 % (FLUSH) 0.9 %
10 SYRINGE (ML) INJECTION
Status: DISCONTINUED | OUTPATIENT
Start: 2022-01-18 | End: 2022-01-19

## 2022-01-18 RX ORDER — SEVELAMER CARBONATE 800 MG/1
1600 TABLET, FILM COATED ORAL
Status: DISCONTINUED | OUTPATIENT
Start: 2022-01-18 | End: 2022-01-18

## 2022-01-18 RX ORDER — ENOXAPARIN SODIUM 100 MG/ML
30 INJECTION SUBCUTANEOUS EVERY 24 HOURS
Status: DISCONTINUED | OUTPATIENT
Start: 2022-01-18 | End: 2022-01-18

## 2022-01-18 RX ORDER — HYDROCODONE BITARTRATE AND ACETAMINOPHEN 500; 5 MG/1; MG/1
TABLET ORAL
Status: DISCONTINUED | OUTPATIENT
Start: 2022-01-18 | End: 2022-01-19 | Stop reason: HOSPADM

## 2022-01-18 RX ORDER — IBUPROFEN 200 MG
24 TABLET ORAL
Status: DISCONTINUED | OUTPATIENT
Start: 2022-01-18 | End: 2022-01-18

## 2022-01-18 RX ORDER — INSULIN ASPART 100 [IU]/ML
0-5 INJECTION, SOLUTION INTRAVENOUS; SUBCUTANEOUS
Status: DISCONTINUED | OUTPATIENT
Start: 2022-01-18 | End: 2022-01-18

## 2022-01-18 RX ORDER — INSULIN ASPART 100 [IU]/ML
1-10 INJECTION, SOLUTION INTRAVENOUS; SUBCUTANEOUS
Status: DISCONTINUED | OUTPATIENT
Start: 2022-01-18 | End: 2022-01-18

## 2022-01-18 RX ORDER — NIFEDIPINE 30 MG/1
60 TABLET, EXTENDED RELEASE ORAL 2 TIMES DAILY
Status: DISCONTINUED | OUTPATIENT
Start: 2022-01-19 | End: 2022-01-18

## 2022-01-18 RX ORDER — HYDROCODONE BITARTRATE AND ACETAMINOPHEN 500; 5 MG/1; MG/1
TABLET ORAL CONTINUOUS
Status: DISCONTINUED | OUTPATIENT
Start: 2022-01-18 | End: 2022-01-19 | Stop reason: HOSPADM

## 2022-01-18 RX ORDER — MUPIROCIN 20 MG/G
OINTMENT TOPICAL 2 TIMES DAILY
Status: DISCONTINUED | OUTPATIENT
Start: 2022-01-18 | End: 2022-01-19 | Stop reason: HOSPADM

## 2022-01-18 RX ORDER — HEPARIN SODIUM 1000 [USP'U]/ML
1000 INJECTION, SOLUTION INTRAVENOUS; SUBCUTANEOUS
Status: DISCONTINUED | OUTPATIENT
Start: 2022-01-18 | End: 2022-01-19 | Stop reason: HOSPADM

## 2022-01-18 RX ORDER — INSULIN ASPART 100 [IU]/ML
0-4 INJECTION, SOLUTION INTRAVENOUS; SUBCUTANEOUS
Status: DISCONTINUED | OUTPATIENT
Start: 2022-01-18 | End: 2022-01-18

## 2022-01-18 RX ORDER — POTASSIUM CHLORIDE 7.45 MG/ML
10 INJECTION INTRAVENOUS
Status: CANCELLED | OUTPATIENT
Start: 2022-01-18 | End: 2022-01-18

## 2022-01-18 RX ORDER — IBUPROFEN 200 MG
24 TABLET ORAL
Status: DISCONTINUED | OUTPATIENT
Start: 2022-01-18 | End: 2022-01-19 | Stop reason: HOSPADM

## 2022-01-18 RX ORDER — ATORVASTATIN CALCIUM 20 MG/1
80 TABLET, FILM COATED ORAL NIGHTLY
Status: DISCONTINUED | OUTPATIENT
Start: 2022-01-18 | End: 2022-01-19 | Stop reason: HOSPADM

## 2022-01-18 RX ORDER — FUROSEMIDE 10 MG/ML
80 INJECTION INTRAMUSCULAR; INTRAVENOUS
Status: DISCONTINUED | OUTPATIENT
Start: 2022-01-18 | End: 2022-01-18

## 2022-01-18 RX ORDER — IBUPROFEN 200 MG
16 TABLET ORAL
Status: DISCONTINUED | OUTPATIENT
Start: 2022-01-18 | End: 2022-01-19 | Stop reason: HOSPADM

## 2022-01-18 RX ORDER — SODIUM CHLORIDE 9 MG/ML
INJECTION, SOLUTION INTRAVENOUS ONCE
Status: DISCONTINUED | OUTPATIENT
Start: 2022-01-18 | End: 2022-01-19 | Stop reason: HOSPADM

## 2022-01-18 RX ORDER — ENALAPRILAT 1.25 MG/ML
0.25 INJECTION INTRAVENOUS
Status: DISCONTINUED | OUTPATIENT
Start: 2022-01-18 | End: 2022-01-18

## 2022-01-18 RX ORDER — GLUCAGON 1 MG
1 KIT INJECTION
Status: DISCONTINUED | OUTPATIENT
Start: 2022-01-18 | End: 2022-01-18

## 2022-01-18 RX ORDER — ACETAMINOPHEN 325 MG/1
650 TABLET ORAL EVERY 4 HOURS PRN
Status: DISCONTINUED | OUTPATIENT
Start: 2022-01-18 | End: 2022-01-19 | Stop reason: HOSPADM

## 2022-01-18 RX ORDER — HYDRALAZINE HYDROCHLORIDE 20 MG/ML
INJECTION INTRAMUSCULAR; INTRAVENOUS
Status: DISPENSED
Start: 2022-01-18 | End: 2022-01-19

## 2022-01-18 RX ORDER — CEFEPIME HYDROCHLORIDE 1 G/50ML
1 INJECTION, SOLUTION INTRAVENOUS
Status: DISCONTINUED | OUTPATIENT
Start: 2022-01-18 | End: 2022-01-19

## 2022-01-18 RX ORDER — NIFEDIPINE 30 MG/1
60 TABLET, EXTENDED RELEASE ORAL DAILY
Status: DISCONTINUED | OUTPATIENT
Start: 2022-01-18 | End: 2022-01-19 | Stop reason: HOSPADM

## 2022-01-18 RX ORDER — ESCITALOPRAM OXALATE 5 MG/1
20 TABLET ORAL DAILY
Status: DISCONTINUED | OUTPATIENT
Start: 2022-01-19 | End: 2022-01-19 | Stop reason: HOSPADM

## 2022-01-18 RX ORDER — CALCITRIOL 0.25 UG/1
0.5 CAPSULE ORAL DAILY
Status: DISCONTINUED | OUTPATIENT
Start: 2022-01-19 | End: 2022-01-19 | Stop reason: HOSPADM

## 2022-01-18 RX ORDER — INSULIN ASPART 100 [IU]/ML
5 INJECTION, SOLUTION INTRAVENOUS; SUBCUTANEOUS
Status: DISCONTINUED | OUTPATIENT
Start: 2022-01-18 | End: 2022-01-18

## 2022-01-18 RX ORDER — HEPARIN SODIUM 5000 [USP'U]/ML
INJECTION, SOLUTION INTRAVENOUS; SUBCUTANEOUS
Status: DISPENSED
Start: 2022-01-18 | End: 2022-01-19

## 2022-01-18 RX ORDER — ENOXAPARIN SODIUM 100 MG/ML
40 INJECTION SUBCUTANEOUS EVERY 24 HOURS
Status: DISCONTINUED | OUTPATIENT
Start: 2022-01-18 | End: 2022-01-18

## 2022-01-18 RX ORDER — TRAZODONE HYDROCHLORIDE 50 MG/1
50 TABLET ORAL NIGHTLY PRN
Status: DISCONTINUED | OUTPATIENT
Start: 2022-01-18 | End: 2022-01-19 | Stop reason: HOSPADM

## 2022-01-18 RX ORDER — FUROSEMIDE 10 MG/ML
120 INJECTION INTRAMUSCULAR; INTRAVENOUS ONCE
Status: DISCONTINUED | OUTPATIENT
Start: 2022-01-18 | End: 2022-01-19 | Stop reason: HOSPADM

## 2022-01-18 RX ORDER — INSULIN ASPART 100 [IU]/ML
0-5 INJECTION, SOLUTION INTRAVENOUS; SUBCUTANEOUS
Status: DISCONTINUED | OUTPATIENT
Start: 2022-01-18 | End: 2022-01-19 | Stop reason: HOSPADM

## 2022-01-18 RX ORDER — MAGNESIUM SULFATE HEPTAHYDRATE 40 MG/ML
2 INJECTION, SOLUTION INTRAVENOUS
Status: DISPENSED | OUTPATIENT
Start: 2022-01-18 | End: 2022-01-19

## 2022-01-18 RX ORDER — CALCITRIOL 0.5 UG/1
0.5 CAPSULE ORAL DAILY
Status: DISCONTINUED | OUTPATIENT
Start: 2022-01-18 | End: 2022-01-18

## 2022-01-18 RX ORDER — HYDRALAZINE HYDROCHLORIDE 50 MG/1
50 TABLET, FILM COATED ORAL EVERY 8 HOURS
Status: DISCONTINUED | OUTPATIENT
Start: 2022-01-18 | End: 2022-01-19 | Stop reason: HOSPADM

## 2022-01-18 RX ORDER — ONDANSETRON 2 MG/ML
4 INJECTION INTRAMUSCULAR; INTRAVENOUS EVERY 6 HOURS PRN
Status: DISCONTINUED | OUTPATIENT
Start: 2022-01-18 | End: 2022-01-19 | Stop reason: HOSPADM

## 2022-01-18 RX ADMIN — ESCITALOPRAM 20 MG: 5 TABLET, FILM COATED ORAL at 05:01

## 2022-01-18 RX ADMIN — CARVEDILOL 25 MG: 25 TABLET, FILM COATED ORAL at 09:01

## 2022-01-18 RX ADMIN — SODIUM CHLORIDE: 0.9 INJECTION, SOLUTION INTRAVENOUS at 05:01

## 2022-01-18 RX ADMIN — HEPARIN SODIUM 5000 UNITS: 5000 INJECTION INTRAVENOUS; SUBCUTANEOUS at 09:01

## 2022-01-18 RX ADMIN — MUPIROCIN: 20 OINTMENT TOPICAL at 09:01

## 2022-01-18 RX ADMIN — HEPARIN SODIUM 5000 UNITS: 5000 INJECTION INTRAVENOUS; SUBCUTANEOUS at 02:01

## 2022-01-18 RX ADMIN — NITROGLYCERIN 220 MCG/MIN: 20 INJECTION INTRAVENOUS at 03:01

## 2022-01-18 RX ADMIN — ATORVASTATIN CALCIUM 80 MG: 20 TABLET, FILM COATED ORAL at 09:01

## 2022-01-18 RX ADMIN — INSULIN DETEMIR 8 UNITS: 100 INJECTION, SOLUTION SUBCUTANEOUS at 08:01

## 2022-01-18 RX ADMIN — HYDRALAZINE HYDROCHLORIDE 50 MG: 50 TABLET ORAL at 02:01

## 2022-01-18 RX ADMIN — ACETAMINOPHEN 650 MG: 325 TABLET ORAL at 09:01

## 2022-01-18 RX ADMIN — INSULIN DETEMIR 12 UNITS: 100 INJECTION, SOLUTION SUBCUTANEOUS at 06:01

## 2022-01-18 RX ADMIN — ONDANSETRON 4 MG: 2 INJECTION INTRAMUSCULAR; INTRAVENOUS at 09:01

## 2022-01-18 RX ADMIN — CEFEPIME HYDROCHLORIDE 1 G: 1 INJECTION, SOLUTION INTRAVENOUS at 04:01

## 2022-01-18 RX ADMIN — INSULIN HUMAN 3 UNITS/HR: 1 INJECTION, SOLUTION INTRAVENOUS at 01:01

## 2022-01-18 RX ADMIN — MUPIROCIN: 20 OINTMENT TOPICAL at 08:01

## 2022-01-18 RX ADMIN — NIFEDIPINE 60 MG: 30 TABLET, FILM COATED, EXTENDED RELEASE ORAL at 09:01

## 2022-01-18 RX ADMIN — VANCOMYCIN HYDROCHLORIDE 1250 MG: 1.25 INJECTION, POWDER, LYOPHILIZED, FOR SOLUTION INTRAVENOUS at 05:01

## 2022-01-18 RX ADMIN — ONDANSETRON 4 MG: 2 INJECTION INTRAMUSCULAR; INTRAVENOUS at 02:01

## 2022-01-18 NOTE — ASSESSMENT & PLAN NOTE
Lab Results   Component Value Date    CREATININE 3.8 (H) 01/18/2022     Avoid insulin stacking  Titrate insulin slowly

## 2022-01-18 NOTE — PROGRESS NOTES
HD ordered for 3 hours   Patient is ESRD TTS  Comes in with acute resp failure . pulm edema and elevated BP  HD ordered with UF 3 liters

## 2022-01-18 NOTE — CONSULTS
[facility] - Emergency Dept  Nephrology  Consult Note    Patient Name: Isabela Read  MRN: [id number]  Admission Date: 1/17/2022  Hospital Length of Stay: 0 days  Attending Provider: Moustapha Pollard MD   Primary Care Physician: Tavo Bhatia MD  Principal Problem:Acute respiratory failure with hypoxia and hypercarbia    Inpatient consult to Nephrology  Consult performed by: Marky Childress NP  Consult ordered by: Ham Amaya MD  Reason for consult: ESRD        Subjective:     HPI: Isabela Read is a 27 yo female with DM1, HTN, and ESRD on iHD TTS who presented to the ED with chief complaint of SOB.  She last had HD on Saturday in which she reports tolerating well, usually ultrafiltration around 2.8-3.0L/treatment according to patient.  On the early morning of 1/18, she reported worsening SOB without relief.  Her mother called 911 and she was transported to [facility] [place] for further evaluation.  She has had similar episodes like this in the past which resolves with HD.  She reports compliance with fluid restrictions and her HD sessions.  These events usually occur during the weekend when she has an extra day between sessions.  She denies changes in her dietary consumption of sodium.  BP on arrival was 218/136 and she was started on Nitro Gtt and had to be placed on BiPAP for respiratory distress.  Nephrology was consulted for emergent dialysis and she completed early this morning after 2L were removed.  She reports improvement in symptoms post HD, able to be weaned down to 2L NC on evaluation.        Past Medical History:   Diagnosis Date    Anemia     Chronic hypertension with exacerbation during pregnancy in second trimester 11/6/2020    Current regimen (11/6/20):  - Carvedilol 12.5 mg BID - Nifedipine 30 mg daily Baseline CKD + proteinuria    Chronic kidney disease     Depression     Diabetes mellitus     Diarrhea     Encounter for blood transfusion     Gastroparesis     Hepatomegaly  4/29/2021     of psychiatric care     Hyperlipidemia     Hypertension     Nephrotic syndrome     Nephrotic syndrome 3/4/2021    Palpitations     Poor fetal growth affecting management of mother in second trimester 10/15/2020    Restrictive lung disease     Severe pre-eclampsia in second trimester 11/6/2020       Past Surgical History:   Procedure Laterality Date    AV FISTULA PLACEMENT Left 4/7/2021    Procedure: CREATION, AV FISTULA;  Surgeon: Roberto Ryan MD;  [facility];  Service: Peripheral Vascular;  Laterality: Left;    FISTULOGRAM N/A 8/11/2021    Procedure: Fistulogram;  Surgeon: Roberto Ryan MD;  Location: [facility] CATH LAB;  Service: Cardiology;  Laterality: N/A;    PERCUTANEOUS TRANSLUMINAL ANGIOPLASTY OF ARTERIOVENOUS FISTULA N/A 8/11/2021    Procedure: PTA, AV FISTULA;  Surgeon: Roberto Ryan MD;  Location: [facility] CATH LAB;  Service: Cardiology;  Laterality: N/A;    REVISION OF ARTERIOVENOUS FISTULA Left 12/10/2021    Procedure: REVISION, AV FISTULA with BVT;  Surgeon: Roberto Ryan MD;  Location: [facility] OR [address] [place] [address];  Service: Peripheral Vascular;  Laterality: Left;       Review of patient's allergies indicates:  No Known Allergies  Current Facility-Administered Medications   Medication Frequency    0.9%  NaCl infusion (for blood administration) Q24H PRN    0.9%  NaCl infusion Once    acetaminophen tablet 650 mg Q4H PRN    atorvastatin tablet 80 mg QHS    [START ON 1/19/2022] calcitRIOL capsule 0.5 mcg Daily    carvediloL tablet 25 mg BID    cefepime in dextrose 5 % 1 gram/50 mL IVPB 1 g Q24H    dextrose 10 % infusion PRN    dextrose 10 % infusion PRN    dextrose 50% injection 12.5 g PRN    dextrose 50% injection 25 g PRN    [START ON 1/19/2022] EScitalopram oxalate tablet 20 mg Daily    [START ON 1/19/2022] ferrous sulfate tablet 1 each Daily    furosemide injection 120 mg Once    heparin (porcine) injection 1,000 Units PRN    heparin  "(porcine) injection 5,000 Units Q8H    hydrALAZINE tablet 50 mg Q8H    insulin regular in 0.9 % NaCl 100 unit/100 mL (1 unit/mL) infusion Continuous    melatonin tablet 6 mg Nightly PRN    mupirocin 2 % ointment BID    NIFEdipine 24 hr tablet 60 mg Daily    ondansetron injection 4 mg Q6H PRN    [START ON 1/19/2022] sevelamer carbonate tablet 1,600 mg TID WM    sodium chloride 0.9% bolus 250 mL PRN    sodium chloride 0.9% flush 10 mL PRN    sodium chloride 0.9% flush 10 mL PRN    traZODone tablet 50 mg Nightly PRN    vancomycin - pharmacy to dose pharmacy to manage frequency     Current Outpatient Medications   Medication    aspirin (ECOTRIN) 81 MG EC tablet    blood sugar diagnostic Strp    calcitRIOL (ROCALTROL) 0.5 MCG Cap    carvediloL (COREG) 25 MG tablet    escitalopram oxalate (LEXAPRO) 20 MG tablet    ferrous sulfate 325 (65 FE) MG EC tablet    flash glucose scanning reader (FREESTYLE MARCY 14 DAY READER) Misc    flash glucose sensor (FREESTYLE MARCY 14 DAY SENSOR) Kit    hydrALAZINE (APRESOLINE) 25 MG tablet    insulin detemir U-100 (LEVEMIR FLEXTOUCH) 100 unit/mL (3 mL) SubQ InPn pen    insulin lispro (HUMALOG KWIKPEN INSULIN) 100 unit/mL pen    lancets Misc    medroxyPROGESTERone (DEPO-PROVERA) 150 mg/mL Syrg    NIFEdipine (PROCARDIA-XL) 60 MG (OSM) 24 hr tablet    oxyCODONE (ROXICODONE) 5 MG immediate release tablet    pen needle, diabetic 32 gauge x 5/32" Ndle    rosuvastatin (CRESTOR) 20 MG tablet    sevelamer carbonate (RENVELA) 800 mg Tab    traZODone (DESYREL) 50 MG tablet     Facility-Administered Medications Ordered in Other Encounters   Medication Frequency    0.9%  NaCl infusion Continuous     Family History     Problem Relation (Age of Onset)    Diabetes Brother    Heart disease Father    Hypertension Mother        Tobacco Use    Smoking status: Never Smoker    Smokeless tobacco: Never Used   Substance and Sexual Activity    Alcohol use: No    Drug use: No    " Sexual activity: Not Currently     Partners: Male     Comment: monogamous     Review of Systems   Constitutional: Negative for activity change, chills, fatigue and fever.   Respiratory: Positive for shortness of breath. Negative for cough and chest tightness.    Cardiovascular: Positive for leg swelling. Negative for chest pain.   Neurological: Positive for headaches. Negative for dizziness and light-headedness.   Psychiatric/Behavioral: Negative for agitation, behavioral problems and confusion.     Objective:     Vital Signs (Most Recent):  Temp: 98.4 °F (36.9 °C) (01/18/22 0815)  Pulse: 102 (01/18/22 1045)  Resp: (!) 25 (01/18/22 1045)  BP: (!) 180/83 (01/18/22 1032)  SpO2: 95 % (01/18/22 1045)  O2 Device (Oxygen Therapy): High Flow nasal Cannula (01/18/22 0559) Vital Signs (24h Range):  Temp:  [97.4 °F (36.3 °C)-98.5 °F (36.9 °C)] 98.4 °F (36.9 °C)  Pulse:  [102-120] 102  Resp:  [19-44] 25  SpO2:  [87 %-100 %] 95 %  BP: (108-231)/() 180/83     Weight: 64.4 kg (142 lb) (01/17/22 2320)  Body mass index is 24.37 kg/m².  Body surface area is 1.71 meters squared.    I/O last 3 completed shifts:  In: 500 [Other:500]  Out: 2500 [Other:2500]    Physical Exam  Vitals and nursing note reviewed.   Constitutional:       General: She is not in acute distress.     Appearance: Normal appearance. She is not ill-appearing.   HENT:      Head: Normocephalic and atraumatic.      Nose: No congestion or rhinorrhea.      Mouth/Throat:      Mouth: Mucous membranes are moist.      Pharynx: Oropharynx is clear. No oropharyngeal exudate or posterior oropharyngeal erythema.   Eyes:      Extraocular Movements: Extraocular movements intact.      Conjunctiva/sclera: Conjunctivae normal.   Cardiovascular:      Rate and Rhythm: Normal rate and regular rhythm.      Heart sounds: No murmur heard.  No friction rub.   Pulmonary:      Effort: Pulmonary effort is normal. No respiratory distress.      Breath sounds: Rales present. No wheezing.    Abdominal:      General: There is no distension.      Palpations: Abdomen is soft.   Musculoskeletal:      Cervical back: Normal range of motion and neck supple.      Right lower leg: Edema present.      Left lower leg: Edema present.   Skin:     General: Skin is warm and dry.   Neurological:      Mental Status: She is alert.   Psychiatric:         Mood and Affect: Mood normal.         Behavior: Behavior normal.         Significant Labs:  CBC:   Recent Labs   Lab 01/18/22  0605   WBC 16.63*   RBC 2.11*   HGB 6.8*   HCT 21.5*      *   MCH 32.2*   MCHC 31.6*     CMP:   Recent Labs   Lab 01/17/22  2339 01/17/22  2339 01/18/22  0737   *   < > 382*   CALCIUM 8.7   < > 8.5*   ALBUMIN 3.2*  --   --    PROT 7.6  --   --    *   < > 129*   K 5.0   < > 4.3   CO2 23   < > 22*   CL 95   < > 92*   BUN 37*   < > 15   CREATININE 7.4*   < > 3.8*   ALKPHOS 108  --   --    ALT 16  --   --    AST 29  --   --    BILITOT 0.5  --   --     < > = values in this interval not displayed.           Assessment/Plan:     * Acute respiratory failure with hypoxia and hypercarbia  -UF with HD    End stage renal failure on dialysis  ESRD on iHD TTS  [facility] [place]  Dr. Camilo  Minimal residual renal fx    HD completed early this morning in the ED with 2L net fluid removal.    Plan/Recommendations:  -Plan for additional HD later tonight in AM based off staffing.  -will obtain her OP dialysis records, may need adjustment of her EDW  -continue strict I/O's  -place on 1L fluid restrictions   -renal diet  -resume her home BP medications  -check RFP in AM.          Anemia due to chronic kidney disease, on chronic dialysis  -obtain OP records for EPO dosing  -hgb below goal for ESRD, recommend PRBC transfusion.    -can transfuse with her next HD session      Marky Lora, NP  Nephrology  [facility] - [facility] Dept

## 2022-01-18 NOTE — ASSESSMENT & PLAN NOTE
ES[place] on [place]HD [place]TS  [facility]  Dr. Camilo  Minimal residual renal fx    HD completed early this morning in the ED with 2L net fluid removal.    Plan/Recommendations:  -Plan for additional HD later tonight in AM based off staffing.  -will obtain her OP dialysis records, may need adjustment of her EDW  -continue strict I/O's  -place on 1L fluid restrictions   -renal diet  -resume her home BP medications  -check RFP in AM.

## 2022-01-18 NOTE — ED NOTES
Pt is sleeping. NAD noted. BP cuff and pulse ox alarms are set. Bed low and locked, side rails up x2. Call light within reach of pt. Mom at bedside. Will continue to monitor.

## 2022-01-18 NOTE — CONSULTS
Consult noted. Patient to be admitted to MICU, full H&P to follow.     Tegan Gonzales MD  Pulmonary/Critical Care Fellow  01/18/2022 1:32 AM  Spectralink: [id number]

## 2022-01-18 NOTE — HPI
Isabela Read is a 26 y.o. F with history of ESRD on HD (TTS) 2/2 T1DM, HTN, who presents with SOB. Reports receiving full treatment HD on Saturday, and tolerated well. Worsening SOB over last 2 days. Worse with lying flat and on exertion. Reports similar picture occurring twice before, most recently in September, in which she was hospitalized with SOB 2/2 pulmonary edema between dialysis sessions. Reports making urine. Does not take diuretics. Denies fever, CP, cough, abdominal pain, N/V/D/C, dysuria. Some chills. Per EMS, SpO2 75% on RA. Arrived to ED on CPAP.

## 2022-01-18 NOTE — SUBJECTIVE & OBJECTIVE
Past Medical History:   Diagnosis Date    Anemia     Chronic hypertension with exacerbation during pregnancy in second trimester 11/6/2020    Current regimen (11/6/20):  - Carvedilol 12.5 mg BID - Nifedipine 30 mg daily Baseline CKD + proteinuria    Chronic kidney disease     Depression     Diabetes mellitus     Diarrhea     Encounter for blood transfusion     Gastroparesis     Hepatomegaly 4/29/2021    Hx of psychiatric care     Hyperlipidemia     Hypertension     Nephrotic syndrome     Nephrotic syndrome 3/4/2021    Palpitations     Poor fetal growth affecting management of mother in second trimester 10/15/2020    Restrictive lung disease     Severe pre-eclampsia in second trimester 11/6/2020       Past Surgical History:   Procedure Laterality Date    AV FISTULA PLACEMENT Left 4/7/2021    Procedure: CREATION, AV FISTULA;  Surgeon: Roberto Ryan MD;  Location: [facility] OR [address] [place] [address];  Service: Peripheral Vascular;  Laterality: Left;    FISTULOGRAM N/A 8/11/2021    Procedure: Fistulogram;  Surgeon: Roberto Ryan MD;  Location: [facility] CATH LAB;  Service: Cardiology;  Laterality: N/A;    PERCUTANEOUS TRANSLUMINAL ANGIOPLASTY OF ARTERIOVENOUS FISTULA N/A 8/11/2021    Procedure: PTA, AV FISTULA;  Surgeon: Roberto Ryan MD;  Location: [facility] CATH LAB;  Service: Cardiology;  Laterality: N/A;    REVISION OF ARTERIOVENOUS FISTULA Left 12/10/2021    Procedure: REVISION, AV FISTULA with BVT;  Surgeon: Roberto Ryan MD;  Location: [facility] OR [address] [place] [address];  Service: Peripheral Vascular;  Laterality: Left;       Review of patient's allergies indicates:  No Known Allergies  Current Facility-Administered Medications   Medication Frequency    0.9%  NaCl infusion (for blood administration) Q24H PRN    0.9%  NaCl infusion Once    acetaminophen tablet 650 mg Q4H PRN    atorvastatin tablet 80 mg QHS    [START ON 1/19/2022] calcitRIOL capsule 0.5 mcg Daily    carvediloL tablet 25 mg BID    cefepime  "in dextrose 5 % 1 gram/50 mL IVPB 1 g Q24H    dextrose 10 % infusion PRN    dextrose 10 % infusion PRN    dextrose 50% injection 12.5 g PRN    dextrose 50% injection 25 g PRN    [START ON 1/19/2022] EScitalopram oxalate tablet 20 mg Daily    [START ON 1/19/2022] ferrous sulfate tablet 1 each Daily    furosemide injection 120 mg Once    heparin (porcine) injection 1,000 Units PRN    heparin (porcine) injection 5,000 Units Q8H    hydrALAZINE tablet 50 mg Q8H    insulin regular in 0.9 % NaCl 100 unit/100 mL (1 unit/mL) infusion Continuous    melatonin tablet 6 mg Nightly PRN    mupirocin 2 % ointment BID    NIFEdipine 24 hr tablet 60 mg Daily    ondansetron injection 4 mg Q6H PRN    [START ON 1/19/2022] sevelamer carbonate tablet 1,600 mg TID WM    sodium chloride 0.9% bolus 250 mL PRN    sodium chloride 0.9% flush 10 mL PRN    sodium chloride 0.9% flush 10 mL PRN    traZODone tablet 50 mg Nightly PRN    vancomycin - pharmacy to dose pharmacy to manage frequency     Current Outpatient Medications   Medication    aspirin (ECOTRIN) 81 MG EC tablet    blood sugar diagnostic Strp    calcitRIOL (ROCALTROL) 0.5 MCG Cap    carvediloL (COREG) 25 MG tablet    escitalopram oxalate (LEXAPRO) 20 MG tablet    ferrous sulfate 325 (65 FE) MG EC tablet    flash glucose scanning reader (FREESTYLE MARCY 14 DAY READER) Misc    flash glucose sensor (FREESTYLE MARCY 14 DAY SENSOR) Kit    hydrALAZINE (APRESOLINE) 25 MG tablet    insulin detemir U-100 (LEVEMIR FLEXTOUCH) 100 unit/mL (3 mL) SubQ InPn pen    insulin lispro (HUMALOG KWIKPEN INSULIN) 100 unit/mL pen    lancets Misc    medroxyPROGESTERone (DEPO-PROVERA) 150 mg/mL Syrg    NIFEdipine (PROCARDIA-XL) 60 MG (OSM) 24 hr tablet    oxyCODONE (ROXICODONE) 5 MG immediate release tablet    pen needle, diabetic 32 gauge x 5/32" Ndle    rosuvastatin (CRESTOR) 20 MG tablet    sevelamer carbonate (RENVELA) 800 mg Tab    traZODone (DESYREL) 50 MG tablet "     Facility-Administered Medications Ordered in Other Encounters   Medication Frequency    0.9%  NaCl infusion Continuous     Family History     Problem Relation (Age of Onset)    Diabetes Brother    Heart disease Father    Hypertension Mother        Tobacco Use    Smoking status: Never Smoker    Smokeless tobacco: Never Used   Substance and Sexual Activity    Alcohol use: No    Drug use: No    Sexual activity: Not Currently     Partners: Male     Comment: monogamous     Review of Systems   Constitutional: Negative for activity change, chills, fatigue and fever.   Respiratory: Positive for shortness of breath. Negative for cough and chest tightness.    Cardiovascular: Positive for leg swelling. Negative for chest pain.   Neurological: Positive for headaches. Negative for dizziness and light-headedness.   Psychiatric/Behavioral: Negative for agitation, behavioral problems and confusion.     Objective:     Vital Signs (Most Recent):  Temp: 98.4 °F (36.9 °C) (01/18/22 0815)  Pulse: 102 (01/18/22 1045)  Resp: (!) 25 (01/18/22 1045)  BP: (!) 180/83 (01/18/22 1032)  SpO2: 95 % (01/18/22 1045)  O2 Device (Oxygen Therapy): High Flow nasal Cannula (01/18/22 0559) Vital Signs (24h Range):  Temp:  [97.4 °F (36.3 °C)-98.5 °F (36.9 °C)] 98.4 °F (36.9 °C)  Pulse:  [102-120] 102  Resp:  [19-44] 25  SpO2:  [87 %-100 %] 95 %  BP: (108-231)/() 180/83     Weight: 64.4 kg (142 lb) (01/17/22 2320)  Body mass index is 24.37 kg/m².  Body surface area is 1.71 meters squared.    I/O last 3 completed shifts:  In: 500 [Other:500]  Out: 2500 [Other:2500]    Physical Exam  Vitals and nursing note reviewed.   Constitutional:       General: She is not in acute distress.     Appearance: Normal appearance. She is not ill-appearing.   HENT:      Head: Normocephalic and atraumatic.      Nose: No congestion or rhinorrhea.      Mouth/Throat:      Mouth: Mucous membranes are moist.      Pharynx: Oropharynx is clear. No oropharyngeal exudate  or posterior oropharyngeal erythema.   Eyes:      Extraocular Movements: Extraocular movements intact.      Conjunctiva/sclera: Conjunctivae normal.   Cardiovascular:      Rate and Rhythm: Normal rate and regular rhythm.      Heart sounds: No murmur heard.  No friction rub.   Pulmonary:      Effort: Pulmonary effort is normal. No respiratory distress.      Breath sounds: Rales present. No wheezing.   Abdominal:      General: There is no distension.      Palpations: Abdomen is soft.   Musculoskeletal:      Cervical back: Normal range of motion and neck supple.      Right lower leg: Edema present.      Left lower leg: Edema present.   Skin:     General: Skin is warm and dry.   Neurological:      Mental Status: She is alert.   Psychiatric:         Mood and Affect: Mood normal.         Behavior: Behavior normal.         Significant Labs:  CBC:   Recent Labs   Lab 01/18/22  0605   WBC 16.63*   RBC 2.11*   HGB 6.8*   HCT 21.5*      *   MCH 32.2*   MCHC 31.6*     CMP:   Recent Labs   Lab 01/17/22  2339 01/17/22  2339 01/18/22  0737   *   < > 382*   CALCIUM 8.7   < > 8.5*   ALBUMIN 3.2*  --   --    PROT 7.6  --   --    *   < > 129*   K 5.0   < > 4.3   CO2 23   < > 22*   CL 95   < > 92*   BUN 37*   < > 15   CREATININE 7.4*   < > 3.8*   ALKPHOS 108  --   --    ALT 16  --   --    AST 29  --   --    BILITOT 0.5  --   --     < > = values in this interval not displayed.

## 2022-01-18 NOTE — ASSESSMENT & PLAN NOTE
-- Improved, now on RA  Acute hypoxemic hypercapnic respiratory failure 2/2 pulmonary edema /hypervolemia in the setting of ESRD with associated hypertensive emergency, required bipap and nitro on admission  -- Similar picture occurring twice before, most recently in September, hospitalized with pulmonary edema between dialysis sessions.   -- Improved after two rounds of dialysis

## 2022-01-18 NOTE — ED NOTES
Pt feeling anxious. Taking all blankets off and requesting her escitalopram at this time. Team made aware.

## 2022-01-18 NOTE — ASSESSMENT & PLAN NOTE
-- Likely 2/2 hypervolemia in setting of ESRD and HTN  -- /106 on presentation  -- Wean nitroglycerin gtt  -- Continue home regimen  -- Volume removal with HD

## 2022-01-18 NOTE — H&P
[facility] - Emergency Dept  Critical Care Medicine  History & Physical    Patient Name: Isabela Read  MRN: [id number]  Admission Date: 1/17/2022  Hospital Length of Stay: 0 days  Code Status: Full Code  Attending Physician: Ham Amaya MD   Primary Care Provider: Tavo Bhatia MD   Principal Problem: Acute respiratory failure with hypoxia and hypercarbia    Subjective:     HPI:  Isabela Read is a 26 y.o. F with history of ESRD on HD (TTS) 2/2 T1DM, HTN, who presents with SOB. Reports receiving full treatment HD on Saturday, and tolerated well. Worsening SOB over last 2 days. Worse with lying flat and on exertion. Reports similar picture occurring twice before, most recently in September, in which she was hospitalized with SOB 2/2 pulmonary edema between dialysis sessions. Reports making urine. Does not take diuretics. Denies fever, CP, cough, abdominal pain, N/V/D/C, dysuria. Some chills. Per EMS, SpO2 75% on RA. Arrived to ED on CPAP.      Hospital/ICU Course:  No notes on file     Past Medical History:   Diagnosis Date    Anemia     Chronic hypertension with exacerbation during pregnancy in second trimester 11/6/2020    Current regimen (11/6/20):  - Carvedilol 12.5 mg BID - Nifedipine 30 mg daily Baseline CKD + proteinuria    Chronic kidney disease     Depression     Diabetes mellitus     Diarrhea     Encounter for blood transfusion     Gastroparesis     Hepatomegaly 4/29/2021    Hx of psychiatric care     Hyperlipidemia     Hypertension     Nephrotic syndrome     Nephrotic syndrome 3/4/2021    Palpitations     Poor fetal growth affecting management of mother in second trimester 10/15/2020    Restrictive lung disease     Severe pre-eclampsia in second trimester 11/6/2020       Past Surgical History:   Procedure Laterality Date    AV FISTULA PLACEMENT Left 4/7/2021    Procedure: CREATION, AV FISTULA;  Surgeon: Roberto Ryan MD;  Location: [facility] OR [address] [place] [address];  Service:  Peripheral Vascular;  Laterality: Left;    FISTULOGRAM N/A 8/11/2021    Procedure: Fistulogram;  Surgeon: Roberto Ryan MD;  Location: [facility] CATH LAB;  Service: Cardiology;  Laterality: N/A;    PERCUTANEOUS TRANSLUMINAL ANGIOPLASTY OF ARTERIOVENOUS FISTULA N/A 8/11/2021    Procedure: PTA, AV FISTULA;  Surgeon: Roberto Ryan MD;  Location: [facility] CATH LAB;  Service: Cardiology;  Laterality: N/A;    REVISION OF ARTERIOVENOUS FISTULA Left 12/10/2021    Procedure: REVISION, AV FISTULA with BVT;  Surgeon: Roberto Ryan MD;  Location: [facility] OR [facility];  Service: Peripheral Vascular;  Laterality: Left;       Review of patient's allergies indicates:  No Known Allergies    Family History     Problem Relation (Age of Onset)    Diabetes Brother    Heart disease Father    Hypertension Mother        Tobacco Use    Smoking status: Never Smoker    Smokeless tobacco: Never Used   Substance and Sexual Activity    Alcohol use: No    Drug use: No    Sexual activity: Not Currently     Partners: Male     Comment: monogamous      Review of Systems   Constitutional: Positive for chills. Negative for fever.   HENT: Negative for sore throat.    Respiratory: Positive for shortness of breath. Negative for cough.    Cardiovascular: Negative for chest pain and leg swelling.   Gastrointestinal: Negative for abdominal pain, constipation, diarrhea, nausea and vomiting.   Genitourinary: Negative for dysuria.   Musculoskeletal: Negative for myalgias.   Skin: Negative for rash.   Neurological: Negative for dizziness and headaches.   Psychiatric/Behavioral: Negative for confusion.     Objective:     Vital Signs (Most Recent):  Temp: 97.4 °F (36.3 °C) (01/17/22 2320)  Pulse: 108 (01/18/22 0132)  Resp: (!) 29 (01/18/22 0132)  BP: (!) 197/97 (01/18/22 0132)  SpO2: 99 % (01/18/22 0132) Vital Signs (24h Range):  Temp:  [97.4 °F (36.3 °C)] 97.4 °F (36.3 °C)  Pulse:  [108-120] 108  Resp:  [25-42] 29  SpO2:  [99 %-100 %] 99 %  BP:  (174-231)/() 197/97   Weight: 64.4 kg (142 lb)  Body mass index is 24.37 kg/m².    No intake or output data in the 24 hours ending 01/18/22 0135    Physical Exam  HENT:      Head: Normocephalic and atraumatic.      Mouth/Throat:      Mouth: Mucous membranes are moist.      Pharynx: Oropharynx is clear.   Eyes:      Extraocular Movements: Extraocular movements intact.      Pupils: Pupils are equal, round, and reactive to light.   Cardiovascular:      Rate and Rhythm: Normal rate and regular rhythm.      Pulses: Normal pulses.      Heart sounds: Normal heart sounds.   Pulmonary:      Effort: Respiratory distress present.      Breath sounds: Rales present.   Abdominal:      General: Abdomen is flat. Bowel sounds are normal.      Palpations: Abdomen is soft.   Musculoskeletal:         General: No swelling.      Cervical back: Neck supple.   Skin:     General: Skin is warm and dry.      Capillary Refill: Capillary refill takes less than 2 seconds.   Neurological:      General: No focal deficit present.      Mental Status: She is alert and oriented to person, place, and time. Mental status is at baseline.   Psychiatric:         Mood and Affect: Mood normal.         Behavior: Behavior normal.         Thought Content: Thought content normal.         Judgment: Judgment normal.         Vents:  Oxygen Concentration (%): 50 (01/17/22 2331)  Lines/Drains/Airways     Central Venous Catheter Line                 Hemodialysis Catheter right subclavian -- days          Drain                 Hemodialysis AV Fistula Left upper arm -- days          Peripheral Intravenous Line                 Peripheral IV - Single Lumen 01/17/22 18 G Right Antecubital 1 day         Peripheral IV - Single Lumen 01/17/22 2342 20 G Right Hand <1 day              Significant Labs:    CBC/Anemia Profile:  Recent Labs   Lab 01/17/22 2328 01/17/22  2339   WBC  --  18.13*   HGB  --  7.8*   HCT 33* 24.9*   PLT  --  341   MCV  --  102*   RDW  --  16.2*         Chemistries:  Recent Labs   Lab 01/17/22  2339   *   K 5.0   CL 95   CO2 23   BUN 37*   CREATININE 7.4*   CALCIUM 8.7   ALBUMIN 3.2*   PROT 7.6   BILITOT 0.5   ALKPHOS 108   ALT 16   AST 29   MG 2.4   PHOS 5.0*       ABGs:   Recent Labs   Lab 01/17/22  2328   PH 7.306*   PCO2 63.3*   HCO3 31.6*   POCSATURATED 84*   BE 5     Blood Culture: No results for input(s): LABBLOO in the last 48 hours.  BMP:   Recent Labs   Lab 01/17/22  2339   *   *   K 5.0   CL 95   CO2 23   BUN 37*   CREATININE 7.4*   CALCIUM 8.7   MG 2.4     CMP:   Recent Labs   Lab 01/17/22  2339   *   K 5.0   CL 95   CO2 23   *   BUN 37*   CREATININE 7.4*   CALCIUM 8.7   PROT 7.6   ALBUMIN 3.2*   BILITOT 0.5   ALKPHOS 108   AST 29   ALT 16   ANIONGAP 14   EGFRNONAA 6.9*     Lactic Acid:   Recent Labs   Lab 01/17/22  2339   LACTATE 2.4*     Troponin:   Recent Labs   Lab 01/17/22  2339   TROPONINI 0.074*     Urine Culture: No results for input(s): LABURIN in the last 48 hours.  Urine Studies: No results for input(s): COLORU, APPEARANCEUA, PHUR, SPECGRAV, PROTEINUA, GLUCUA, KETONESU, BILIRUBINUA, OCCULTUA, NITRITE, UROBILINOGEN, LEUKOCYTESUR, RBCUA, WBCUA, BACTERIA, SQUAMEPITHEL, HYALINECASTS in the last 48 hours.    Invalid input(s): WRIGHTSUR  All pertinent labs within the past 24 hours have been reviewed.    Significant Imaging: I have reviewed all pertinent imaging results/findings within the past 24 hours.     EXAMINATION:   XR CHEST AP PORTABLE     CLINICAL HISTORY:   CHF;     TECHNIQUE:   Single frontal view of the chest was performed.     COMPARISON:   10/22/2021     FINDINGS:   Cardiac silhouette is normal size.  New limb and central venous catheter on the right with tip overlying the SVC.  No significant change.     Bilateral patchy and ground-glass infiltrates diffusely.  Findings are similar to the prior study.  Findings could be associated with pneumonia and/or edema.    Impression:       1. No  significant change in cardiopulmonary status.   2. Stable bilateral prominent patchy and ground-glass infiltrates diffusely the findings could be associated with pneumonia and/or edema.  Follow-up recommended.          Assessment/Plan:     Pulmonary  Acute respiratory failure with hypoxia and hypercarbia  -- Acute hypoxemic hypercapnic respiratory failure 2/2 pulmonary edema /hypervolemia in the setting of ESRD with associated hypertensive urgency  -- Similar picture occurring twice before, most recently in September, hospitalized with pulmonary edema between dialysis sessions.   -- Appreciate nephrology. Urgent HD with UF tonight.  -- Consider diuretics, as she is making urine and having repeated hypervolemic episodes between HD sessions  -- BiPAP 14/5, 50%. Wean as tolerated as pulmonary edema improves    Acute pulmonary edema  See respiratory failure    Cardiac/Vascular  Hypertensive urgency  -- Likely 2/2 hypervolemia in setting of ESRD and HTN  -- /106 on presentation  -- Wean nitroglycerin gtt  -- Continue home regimen  -- Volume removal with HD      Renal/  End stage renal failure on dialysis  See respiratory failure    Oncology  Leukocytosis  -- WBC 18 on presentation. Lactate 2.4  -- No localized s/sx of infection  -- F/u Bcx and UA  -- Trend WBC  -- Initiate vanc and cefepime. De-escalate as indicated    Endocrine  Hyperglycemia due to type 1 diabetes mellitus  -- Decrease home dose during hospitalization. Detemir 8 units BID, Aspart 5 units TID, SSI  -- Glu ACHS  -- Goal 140-180  -- Diabetic diet        Critical Care Daily Checklist:    A: Awake: RASS Goal/Actual Goal:    Actual:     B: Spontaneous Breathing Trial Performed?     C: SAT & SBT Coordinated?                        D: Delirium: CAM-ICU     E: Early Mobility Performed? Yes   F: Feeding Goal:    Status:     Current Diet Order   Procedures    Diet diabetic [facility]; 2000 Calorie; Renal     Order Specific Question:   Indicate  patient location for additional diet options:     Answer:   [facility]     Order Specific Question:   Total calories:     Answer:   2000 Calorie     Order Specific Question:   Additional Diet Options:     Answer:   Renal      AS: Analgesia/Sedation    T: Thromboembolic Prophylaxis yes   H: HOB > 300 Yes   U: Stress Ulcer Prophylaxis (if needed)    G: Glucose Control yes   B: Bowel Function     I: Indwelling Catheter (Lines & Moncada) Necessity no   D: De-escalation of Antimicrobials/Pharmacotherapies     Plan for the day/ETD HD, NTG gtt    Code Status:  Family/Goals of Care: Full Code  Discussed with mother at bedside       Critical secondary to Patient has a condition that poses threat to life and bodily function: Severe Respiratory Distress, Hypertensive Urgency     Critical care was time spent personally by me on the following activities: development of treatment plan with patient or surrogate and bedside caregivers, discussions with consultants, evaluation of patient's response to treatment, examination of patient, ordering and performing treatments and interventions, ordering and review of laboratory studies, ordering and review of radiographic studies, pulse oximetry, re-evaluation of patient's condition. This critical care time did not overlap with that of any other provider or involve time for any procedures.     Soraida Hernandez, DO  Critical Care Medicine  [facility] - Emergency Dept

## 2022-01-18 NOTE — ASSESSMENT & PLAN NOTE
BG goal 140-180    BG significantly above goal ranges at time of consult. Pt did not receive Levemir yesterday evening; received 8 units Levemir this morning. Currently NPO.    Plan:  -Start Transition IV insulin infusion at 0.5 u/hr with stepdown parameters   -Start Low Dose Correction Scale  -BG monitoring q 4 hrs while NPO    ** Please notify Endocrine for any change and/or advance in diet**    Discharge plans: TBD

## 2022-01-18 NOTE — ASSESSMENT & PLAN NOTE
-- Acute hypoxemic hypercapnic respiratory failure 2/2 pulmonary edema /hypervolemia in the setting of ESRD with associated hypertensive urgency  -- Similar picture occurring twice before, most recently in September, hospitalized with pulmonary edema between dialysis sessions.   -- Appreciate nephrology. Urgent HD with UF tonight.  -- Consider diuretics, as she is making urine and having repeated hypervolemic episodes between HD sessions  -- BiPAP 14/5, 50%. Wean as tolerated as pulmonary edema improves

## 2022-01-18 NOTE — ASSESSMENT & PLAN NOTE
-- Decrease home dose during hospitalization. Detemir 8 units BID, Aspart 5 units TID, SSI  -- Glu ACHS  -- Goal 140-180  -- Diabetic diet

## 2022-01-18 NOTE — PLAN OF CARE
SICU PLAN OF CARE NOTE    Dx: Acute respiratory failure with hypoxia and hypercarbia    Goals of Care:  MAP >65    Vital Signs:  BP (!) 156/83   Pulse 96   Temp 97.9 °F (36.6 °C) (Oral)   Resp 19   Wt 64.4 kg (142 lb)   SpO2 98%   BMI 24.37 kg/m²     Cardiac:  NSR    Resp:  SpO2 97 % on 2 L    Neuro:  AAO x4    Gtts:  Insulin    Urine Output:  Anuric 0 cc/shift    Drains:  NA    CRRT will be started     Diet:  NPO     Labs/Accuchecks:  daily    Skin:  All skin remains free from injury.  Patient turned Q2, waffle mattress inflated, ICU bed working correctly.    Shift Events:  Patient transferred to SICU for CRRT, insulin drip started.  See flowsheet for further assessment/details.  Family updated on current condition/plan of care, questions answered, and emotional support provided.   MD updated on current condition, vitals, labs, and gtts.  No new orders received, will continue to monitor.

## 2022-01-18 NOTE — ASSESSMENT & PLAN NOTE
-obtain OP records for EPO dosing  -hgb below goal for ESRD, recommend PRBC transfusion.    -can transfuse with her next HD session

## 2022-01-18 NOTE — ED TRIAGE NOTES
Isabela Read, a 26 y.o. female presents to the ED w/ complaint of SOB. RA Spo2 75% with EMS. Pt arrives on CPAP. Pt get dialysis Tues, Thursday, Saturday. Pt received full treatment Saturday. Pt reports this is the 3rd this this has happened.     Triage note:  Chief Complaint   Patient presents with    Shortness of Breath     Dialysis pt c/o SOB. Spo2 75% per EMS. Arrives on bipap.     Review of patient's allergies indicates:  No Known Allergies  Past Medical History:   Diagnosis Date    Anemia     Chronic hypertension with exacerbation during pregnancy in second trimester 11/6/2020    Current regimen (11/6/20):  - Carvedilol 12.5 mg BID - Nifedipine 30 mg daily Baseline CKD + proteinuria    Chronic kidney disease     Depression     Diabetes mellitus     Diarrhea     Encounter for blood transfusion     Gastroparesis     Hepatomegaly 4/29/2021    Hx of psychiatric care     Hyperlipidemia     Hypertension     Nephrotic syndrome     Nephrotic syndrome 3/4/2021    Palpitations     Poor fetal growth affecting management of mother in second trimester 10/15/2020    Restrictive lung disease     Severe pre-eclampsia in second trimester 11/6/2020

## 2022-01-18 NOTE — PROGRESS NOTES
Pharmacist Renal Dose Adjustment Note    Isabela Read is a 26 y.o. female being treated with the medication cefepime.    Patient Data:    Vital Signs (Most Recent):  Temp: 97.4 °F (36.3 °C) (01/17/22 2320)  Pulse: (!) 117 (01/18/22 0348)  Resp: (!) 26 (01/18/22 0348)  BP: (!) 195/96 (01/18/22 0348)  SpO2: 99 % (01/18/22 0348)   Vital Signs (72h Range):  Temp:  [97.4 °F (36.3 °C)]   Pulse:  [108-120]   Resp:  [25-42]   BP: (173-231)/()   SpO2:  [91 %-100 %]      Recent Labs   Lab 01/17/22 2339   CREATININE 7.4*     Serum creatinine: 7.4 mg/dL (H) 01/17/22 2339  Estimated creatinine clearance: 9.9 mL/min (A)    Cefepime 1 gram IV every 12 hours will be changed to cefepime 1 gram IV every 24 hours.    Pharmacist's Name: Cornelius Bloom  Pharmacist's Extension: [phone]

## 2022-01-18 NOTE — PROGRESS NOTES
01/18/22 1751   Treatment   Treatment Type SLED   Treatment Status New start   Dialysis Machine Number [id number]  (RO44)   Solutions Labeled and Current  Yes   Access Tunneled Cath;Right   Catheter Dressing Intact  Yes   Alarms Engaged Yes     CRRT started as ordered without issue, pt left in NAD/VSS. Report given to primary RN, will increase UF to goal of 500ml/hr as tolerated.

## 2022-01-18 NOTE — PROGRESS NOTES
Pharmacist Renal Dose Adjustment Note    Isabela Read is a 26 y.o. female being treated with the medication enoxaparin.    Patient Data:    Vital Signs (Most Recent):  Temp: 97.4 °F (36.3 °C) (01/17/22 2320)  Pulse: 110 (01/18/22 0152)  Resp: (!) 27 (01/18/22 0152)  BP: (!) 190/95 (01/18/22 0152)  SpO2: 100 % (01/18/22 0152)   Vital Signs (72h Range):  Temp:  [97.4 °F (36.3 °C)]   Pulse:  [108-120]   Resp:  [25-42]   BP: (174-231)/()   SpO2:  [99 %-100 %]      Recent Labs   Lab 01/17/22  2339   CREATININE 7.4*     Serum creatinine: 7.4 mg/dL (H) 01/17/22 2339  Estimated creatinine clearance: 9.9 mL/min (A)    Enoxaparin 40 mg subcutaneous daily will be changed to Enoxaparin 30 mg subcutaneous daily     Pharmacist's Name: Cornelius Bloom  Pharmacist's Extension: [phone]

## 2022-01-18 NOTE — PROGRESS NOTES
01/18/22 0552   Post-Hemodialysis Assessment   Rinseback Volume (mL) 250 mL   Blood Volume Processed (Liters) 69.3 L   Dialyzer Clearance Lightly streaked   Duration of Treatment (minutes) 180 minutes   Hemodialysis Intake (mL) 500 mL   Total UF (mL) 2500 mL   Net Fluid Removal 2000   Patient Response to Treatment tolerated well   Post-Hemodialysis Comments stable   HD completed per MD order and report given to primary nurse.

## 2022-01-18 NOTE — HPI
Isabela Read is a 27 yo female with DM1, HTN, and ESRD on iHD TTS who presented to the ED with chief complaint of SOB.  She last had HD on Saturday in which she reports tolerating well, usually ultrafiltration around 2.8-3.0L/treatment according to patient.  On the early morning of 1/18, she reported worsening SOB without relief.  Her mother called [phone] and [place] [facility] [place].  She has had similar episodes like this in the past which resolves with HD.  She reports compliance with fluid restrictions and her HD sessions.  These events usually occur during the weekend when she has an extra day between sessions.  She denies changes in her dietary consumption of sodium.  BP on arrival was 218/136 and she was started on Nitro Gtt and had to be placed on BiPAP for respiratory distress.  Nephrology was consulted for emergent dialysis and she completed early this morning after 2L were removed.  She reports improvement in symptoms post HD, able to be weaned down to 2L NC on evaluation.         26 y.o. F

## 2022-01-18 NOTE — PROGRESS NOTES
Pharmacokinetic Initial Assessment: IV Vancomycin    Assessment/Plan:    Initiate intravenous vancomycin with loading dose of 1250 mg once with subsequent doses when random concentrations are less than 20 mcg/mL  Desired empiric serum trough concentration is 10 to 20 mcg/mL  Draw vancomycin random level with routine morning labs on 1/19/22.  Pharmacy will continue to follow and monitor vancomycin.      Please contact pharmacy at extension [phone] with any questions regarding this assessment.     Thank you for the consult,   Cornelius Bloom       Patient brief summary:  Isabela Read is a 26 y.o. female initiated on antimicrobial therapy with IV Vancomycin for treatment of suspected sepsis    Drug Allergies:   Review of patient's allergies indicates:  No Known Allergies    Actual Body Weight:   64.4 kg    Renal Function:   Estimated Creatinine Clearance: 9.9 mL/min (A) (based on SCr of 7.4 mg/dL (H)).,     Dialysis Method (if applicable):  intermittent HD    CBC (last 72 hours):  Recent Labs   Lab Result Units 01/17/22  2339   WBC K/uL 18.13*   Hemoglobin g/dL 7.8*   Hematocrit % 24.9*   Platelets K/uL 341   Gran % % 74.0*   Lymph % % 18.1   Mono % % 5.4   Eosinophil % % 1.7   Basophil % % 0.4   Differential Method  Automated       Metabolic Panel (last 72 hours):  Recent Labs   Lab Result Units 01/17/22  2339   Sodium mmol/L 132*   Potassium mmol/L 5.0   Chloride mmol/L 95   CO2 mmol/L 23   Glucose mg/dL 303*   BUN mg/dL 37*   Creatinine mg/dL 7.4*   Albumin g/dL 3.2*   Total Bilirubin mg/dL 0.5   Alkaline Phosphatase U/L 108   AST U/L 29   ALT U/L 16   Magnesium mg/dL 2.4   Phosphorus mg/dL 5.0*       Drug levels (last 3 results):  No results for input(s): VANCOMYCINRA, VANCOMYCINPE, VANCOMYCINTR in the last 72 hours.    Microbiologic Results:  Microbiology Results (last 7 days)     Procedure Component Value Units Date/Time    Blood culture #2 **CANNOT BE ORDERED STAT** [[id number]] Collected: 01/17/22 2613     Order Status: Sent Specimen: Blood from Peripheral, Hand, Right Updated: 01/17/22 2347    Blood culture #1 **CANNOT BE ORDERED STAT** [[id number]] Collected: 01/17/22 2340    Order Status: Sent Specimen: Blood from Peripheral, Antecubital, Right Updated: 01/17/22 2345

## 2022-01-18 NOTE — ED NOTES
Pt actively vomiting at this time. Bipap removed and pt placed on NC at this time per RT. Team made aware.

## 2022-01-18 NOTE — CONSULTS
[facility] - Emergency Dept  Endocrinology  Diabetes Consult Note    Consult Requested by: Moustapha Pollard MD   Reason for admit: Acute respiratory failure with hypoxia and hypercarbia    HISTORY OF PRESENT ILLNESS:  Reason for Consult: Management of T1DM, Hyperglycemia     Surgical Procedure and Date: n/a    Diabetes diagnosis year: at 8 years of age     Home Diabetes Medications:  Levemir 10 units in the am and 12 units q HS, Humalog 10 units with meals    How often checking glucose at home?  Freestyle Jayda    BG readings on regimen: variable per patient (100-300s)   Hypoglycemia on the regimen?  Yes, 1-2x weekly  Missed doses on regimen?  No     Diabetes Complications include:     Hyperglycemia, Hypoglycemia , gastroparesis, Diabetic nephropathy, and Diabetic chronic kidney disease          Complicating diabetes co morbidities:   HTN, ESRD on dialysis         HPI:   Patient is a 26 y.o. female with a diagnosis of T1DM, ESRD on HD, likely restrictive lung disease, HLD, BALDOMERO, h/o AVF occlusion who presented to the ED 1/17 for shortness of breath, CXR upon presentation showed pulmonary edema. She will be admitted for further workup and management. Endocrinology consulted for management of T1DM.                  Lab Results   Component Value Date     HGBA1C 10.2 (H) 09/17/2021               Interval HPI:   Overnight events: Remains in ED at time of consult. BG significantly above goal ranges (>400). Received Levemir 8 units this morning. HD completed overnight. Diet NPO Except for: Medication, Sips with Medication  Eating:   NPO  Nausea: Yes  Hypoglycemia and intervention: No  Fever: No  TPN and/or TF: No  If yes, type of TF/TPN and rate: n/a    PMH, PSH, FH, SH reviewed     ROS:  Constitutional: Negative for weight changes.  Eyes: Negative for visual disturbance.  Respiratory: Positive for SOB.  Cardiovascular: Negative for chest pain.  Gastrointestinal: Positive for nausea.   Endocrine: Negative for polyuria,  polydipsia.  Musculoskeletal: Negative for back pain.  Skin: Negative for rash.  Neurological: Negative for syncope.  Psychiatric/Behavioral: Negative for depression.    Review of Systems    Current Medications and/or Treatments Impacting Glycemic Control  Immunotherapy:    Immunosuppressants     None        Steroids:   Hormones (From admission, onward)            Start     Stop Route Frequency Ordered    01/18/22 0253  melatonin tablet 6 mg         -- Oral Nightly PRN 01/18/22 0155        Pressors:    Autonomic Drugs (From admission, onward)            None        Hyperglycemia/Diabetes Medications:   Antihyperglycemics (From admission, onward)            Start     Stop Route Frequency Ordered    01/18/22 1245  insulin regular in 0.9 % NaCl 100 unit/100 mL (1 unit/mL) infusion        Question:  Enter initial dose (Units/hr):  Answer:  0.5    -- IV Continuous 01/18/22 1141    01/18/22 1241  insulin aspart U-100 pen 0-4 Units         -- SubQ As needed (PRN) 01/18/22 1141             PHYSICAL EXAMINATION:  Vitals:    01/18/22 1045   BP:    Pulse: 102   Resp: (!) 25   Temp:      Body mass index is 24.37 kg/m².    Physical Exam   Constitutional: Well developed, well nourished, NAD.  ENT: External ears no masses with nose patent; normal hearing.  Neck: Supple; trachea midline.  Cardiovascular: Normal rate and regular rhythm.   Lungs: Rales present.   Abdomen: Soft, no masses, no hernias.  MS: No clubbing or cyanosis of nails noted; unable to assess gait.  Skin: No rashes, lesions, or ulcers; no nodules. Injection sites are ok. No lipo hypertropthy or atrophy.  Psychiatric: Good judgement and insight; normal mood and affect.  Neurological: Cranial nerves are grossly intact.   Foot: Nails in good condition, no amputations noted.          Labs Reviewed and Include   Recent Labs   Lab 01/17/22  2339 01/17/22  2339 01/18/22  0737   *   < > 382*   CALCIUM 8.7   < > 8.5*   ALBUMIN 3.2*  --   --    PROT 7.6  --   --    NA  132*   < > 129*   K 5.0   < > 4.3   CO2 23   < > 22*   CL 95   < > 92*   BUN 37*   < > 15   CREATININE 7.4*   < > 3.8*   ALKPHOS 108  --   --    ALT 16  --   --    AST 29  --   --    BILITOT 0.5  --   --     < > = values in this interval not displayed.     Lab Results   Component Value Date    WBC 16.63 (H) 01/18/2022    HGB 6.8 (L) 01/18/2022    HCT 21.5 (L) 01/18/2022     (H) 01/18/2022     01/18/2022     No results for input(s): TSH, FREET4 in the last 168 hours.  Lab Results   Component Value Date    HGBA1C 10.9 (H) 10/23/2021       Nutritional status:   Body mass index is 24.37 kg/m².  Lab Results   Component Value Date    ALBUMIN 3.2 (L) 01/17/2022    ALBUMIN 2.9 (L) 10/24/2021    ALBUMIN 2.8 (L) 10/23/2021     No results found for: PREALBUMIN    Estimated Creatinine Clearance: 19.4 mL/min (A) (based on SCr of 3.8 mg/dL (H)).    Accu-Checks  Recent Labs     01/18/22  0222 01/18/22  0550 01/18/22  0705 01/18/22  0944 01/18/22  1138   POCTGLUCOSE 199* 267* 358* 467* 438*        ASSESSMENT and PLAN    * Acute respiratory failure with hypoxia and hypercarbia  Managed per primary team  Optimize BG control      Type 1 diabetes mellitus with end-stage renal disease (ESRD)  BG goal 140-180    BG significantly above goal ranges at time of consult. Pt did not receive Levemir yesterday evening; received 8 units Levemir this morning. Currently NPO.    Plan:  -Start Transition IV insulin infusion at 0.5 u/hr with stepdown parameters   -Start Low Dose Correction Scale  -BG monitoring q 4 hrs while NPO    ** Please notify Endocrine for any change and/or advance in diet**    Discharge plans: TBD    End stage renal failure on dialysis  Lab Results   Component Value Date    CREATININE 3.8 (H) 01/18/2022     Avoid insulin stacking  Titrate insulin slowly      Mixed hyperlipidemia  May increase insulin resistance.             Plan discussed with patient, family, and RN at bedside.     Holli Good,  NP  Endocrinology  [facility] - Emergency Dept

## 2022-01-18 NOTE — HPI
Reason for Consult: Management of T1DM, Hyperglycemia     Surgical Procedure and Date: n/a    Diabetes diagnosis year: at 8 years of age     Home Diabetes Medications:  Levemir 10 units in the am and 12 units q HS, Humalog 10 units with meals    How often checking glucose at home?  Freestyle Jayda    BG readings on regimen: variable per patient (100-300s)   Hypoglycemia on the regimen?  Yes, 1-2x weekly  Missed doses on regimen?  No     Diabetes Complications include:     Hyperglycemia, Hypoglycemia , gastroparesis, Diabetic nephropathy, and Diabetic chronic kidney disease          Complicating diabetes co morbidities:   HTN, ESRD on dialysis         HPI:   Patient is a 26 y.o. female with a diagnosis of T1DM, ESRD on HD, likely restrictive lung disease, HLD, BALDOMERO, h/o AVF occlusion who presented to the ED 1/17 for shortness of breath, CXR upon presentation showed pulmonary edema. She will be admitted for further workup and management. Endocrinology consulted for management of T1DM.                  Lab Results   Component Value Date     HGBA1C 10.2 (H) 09/17/2021

## 2022-01-18 NOTE — SUBJECTIVE & OBJECTIVE
Interval HPI:   Overnight events: Remains in ED at time of consult. BG significantly above goal ranges (>400). Received Levemir 8 units this morning. HD completed overnight. Diet NPO Except for: Medication, Sips with Medication  Eating:   NPO  Nausea: Yes  Hypoglycemia and intervention: No  Fever: No  TPN and/or TF: No  If yes, type of TF/TPN and rate: n/a    PMH, PSH, FH, SH reviewed     ROS:  Constitutional: Negative for weight changes.  Eyes: Negative for visual disturbance.  Respiratory: Positive for SOB.  Cardiovascular: Negative for chest pain.  Gastrointestinal: Positive for nausea.   Endocrine: Negative for polyuria, polydipsia.  Musculoskeletal: Negative for back pain.  Skin: Negative for rash.  Neurological: Negative for syncope.  Psychiatric/Behavioral: Negative for depression.    Review of Systems    Current Medications and/or Treatments Impacting Glycemic Control  Immunotherapy:    Immunosuppressants     None        Steroids:   Hormones (From admission, onward)            Start     Stop Route Frequency Ordered    01/18/22 0253  melatonin tablet 6 mg         -- Oral Nightly PRN 01/18/22 0155        Pressors:    Autonomic Drugs (From admission, onward)            None        Hyperglycemia/Diabetes Medications:   Antihyperglycemics (From admission, onward)            Start     Stop Route Frequency Ordered    01/18/22 1245  insulin regular in 0.9 % NaCl 100 unit/100 mL (1 unit/mL) infusion        Question:  Enter initial dose (Units/hr):  Answer:  0.5    -- IV Continuous 01/18/22 1141    01/18/22 1241  insulin aspart U-100 pen 0-4 Units         -- SubQ As needed (PRN) 01/18/22 1141             PHYSICAL EXAMINATION:  Vitals:    01/18/22 1045   BP:    Pulse: 102   Resp: (!) 25   Temp:      Body mass index is 24.37 kg/m².    Physical Exam   Constitutional: Well developed, well nourished, NAD.  ENT: External ears no masses with nose patent; normal hearing.  Neck: Supple; trachea midline.  Cardiovascular: Normal  rate and regular rhythm.   Lungs: Rales present.   Abdomen: Soft, no masses, no hernias.  MS: No clubbing or cyanosis of nails noted; unable to assess gait.  Skin: No rashes, lesions, or ulcers; no nodules. Injection sites are ok. No lipo hypertropthy or atrophy.  Psychiatric: Good judgement and insight; normal mood and affect.  Neurological: Cranial nerves are grossly intact.   Foot: Nails in good condition, no amputations noted.

## 2022-01-18 NOTE — ED PROVIDER NOTES
Encounter Date: 1/17/2022       History     Chief Complaint   Patient presents with    Shortness of Breath     Dialysis pt c/o SOB. Spo2 75% per EMS. Arrives on bipap.     26-year-old female with PMH of ESRD on HD TRS and diabetes presents with shortness of breath.  Per EMS, they recalled for acute onset shortness of breath and found the patient to be satting 75% on room air.  She was subsequently placed on CPAP.  She is also hypertensive to 180 systolic.  They gave 9 sublingual nitroglycerines and placed an inch of nitropaste. She reportedly had a full dialysis session Saturday. Pt is too short of breath to provide a hx upon arrival and she is not accompanied by family. No other hx was able to be obtained at this time.     The history is provided by the EMS personnel.     Review of patient's allergies indicates:  No Known Allergies  Past Medical History:   Diagnosis Date    Anemia     Chronic hypertension with exacerbation during pregnancy in second trimester 11/6/2020    Current regimen (11/6/20):  - Carvedilol 12.5 mg BID - Nifedipine 30 mg daily Baseline CKD + proteinuria    Chronic kidney disease     Depression     Diabetes mellitus     Diarrhea     Encounter for blood transfusion     Gastroparesis     Hepatomegaly 4/29/2021    Hx of psychiatric care     Hyperlipidemia     Hypertension     Nephrotic syndrome     Nephrotic syndrome 3/4/2021    Palpitations     Poor fetal growth affecting management of mother in second trimester 10/15/2020    Restrictive lung disease     Severe pre-eclampsia in second trimester 11/6/2020     Past Surgical History:   Procedure Laterality Date    AV FISTULA PLACEMENT Left 4/7/2021    Procedure: CREATION, AV FISTULA;  Surgeon: Roberto Ryan MD;  Location: [facility] OR [address] [place] [address];  Service: Peripheral Vascular;  Laterality: Left;    FISTULOGRAM N/A 8/11/2021    Procedure: Fistulogram;  Surgeon: Roberto Ryan MD;  Location: [facility] CATH LAB;  Service:  Cardiology;  Laterality: N/A;    PERCUTANEOUS TRANSLUMINAL ANGIOPLASTY OF ARTERIOVENOUS FISTULA N/A 8/11/2021    Procedure: PTA, AV FISTULA;  Surgeon: Roberto Ryan MD;  Location: [facility] CATH LAB;  Service: Cardiology;  Laterality: N/A;    REVISION OF ARTERIOVENOUS FISTULA Left 12/10/2021    Procedure: REVISION, AV FISTULA with BVT;  Surgeon: Roberto Ryan MD;  Location: [facility] OR [facility];  Service: Peripheral Vascular;  Laterality: Left;     Family History   Problem Relation Age of Onset    Hypertension Mother     Heart disease Father     Diabetes Brother     Celiac disease Neg Hx     Cirrhosis Neg Hx     Colon cancer Neg Hx     Colon polyps Neg Hx     Crohn's disease Neg Hx     Inflammatory bowel disease Neg Hx     Liver cancer Neg Hx     Liver disease Neg Hx     Rectal cancer Neg Hx     Stomach cancer Neg Hx     Ulcerative colitis Neg Hx     Esophageal cancer Neg Hx     Hemochromatosis Neg Hx     Pancreatic cancer Neg Hx     Kidney cancer Neg Hx     Bladder Cancer Neg Hx     Uterine cancer Neg Hx     Ovarian cancer Neg Hx      Social History     Tobacco Use    Smoking status: Never Smoker    Smokeless tobacco: Never Used   Substance Use Topics    Alcohol use: No    Drug use: No     Review of Systems   Unable to perform ROS: Severe respiratory distress       Physical Exam     Initial Vitals [01/17/22 2320]   BP Pulse Resp Temp SpO2   (!) 210/110 (!) 120 (!) 42 97.4 °F (36.3 °C) 100 %      MAP       --         Physical Exam    Nursing note and vitals reviewed.  Constitutional: She appears well-developed and well-nourished. She is not diaphoretic.   On BiPAP with tachypnea and increased work of breathing   HENT:   Head: Normocephalic and atraumatic.   Eyes: Conjunctivae and EOM are normal. Pupils are equal, round, and reactive to light.   Neck: Neck supple.   Normal range of motion.  Cardiovascular: Regular rhythm, normal heart sounds and intact distal pulses.   No murmur  heard.  Tachycardic   Pulmonary/Chest:   Increased work of breathing and tachypnea.  Diffuse rales bilaterally. Satting 95% on BiPAP   Abdominal: Abdomen is soft. She exhibits no distension. There is no abdominal tenderness. There is no rebound and no guarding.   Musculoskeletal:         General: No tenderness or edema.      Cervical back: Normal range of motion and neck supple.     Neurological: She is alert and oriented to person, place, and time.   Skin: Skin is warm and dry. Capillary refill takes less than 2 seconds.         ED Course   Procedures  Labs Reviewed   CBC W/ AUTO DIFFERENTIAL - Abnormal; Notable for the following components:       Result Value    WBC 18.13 (*)     RBC 2.44 (*)     Hemoglobin 7.8 (*)     Hematocrit 24.9 (*)      (*)     MCH 32.0 (*)     MCHC 31.3 (*)     RDW 16.2 (*)     Gran # (ANC) 13.4 (*)     Immature Grans (Abs) 0.07 (*)     Gran % 74.0 (*)     All other components within normal limits   COMPREHENSIVE METABOLIC PANEL - Abnormal; Notable for the following components:    Sodium 132 (*)     Glucose 303 (*)     BUN 37 (*)     Creatinine 7.4 (*)     Albumin 3.2 (*)     eGFR if  8.0 (*)     eGFR if non  6.9 (*)     All other components within normal limits   TROPONIN I - Abnormal; Notable for the following components:    Troponin I 0.074 (*)     All other components within normal limits   B-TYPE NATRIURETIC PEPTIDE - Abnormal; Notable for the following components:    BNP 1,756 (*)     All other components within normal limits   PHOSPHORUS - Abnormal; Notable for the following components:    Phosphorus 5.0 (*)     All other components within normal limits   LACTIC ACID, PLASMA - Abnormal; Notable for the following components:    Lactate (Lactic Acid) 2.4 (*)     All other components within normal limits   CBC W/ AUTO DIFFERENTIAL - Abnormal; Notable for the following components:    WBC 16.63 (*)     RBC 2.11 (*)     Hemoglobin 6.8 (*)      Hematocrit 21.5 (*)      (*)     MCH 32.2 (*)     MCHC 31.6 (*)     RDW 16.4 (*)     Immature Granulocytes 1.0 (*)     Gran # (ANC) 14.8 (*)     Immature Grans (Abs) 0.17 (*)     Gran % 89.2 (*)     Lymph % 6.6 (*)     Mono % 2.9 (*)     All other components within normal limits   BASIC METABOLIC PANEL - Abnormal; Notable for the following components:    Sodium 129 (*)     Chloride 92 (*)     CO2 22 (*)     Glucose 382 (*)     Creatinine 3.8 (*)     Calcium 8.5 (*)     eGFR if  17.9 (*)     eGFR if non  15.5 (*)     All other components within normal limits   BETA - HYDROXYBUTYRATE, SERUM - Abnormal; Notable for the following components:    Beta-Hydroxybutyrate 4.5 (*)     All other components within normal limits   ISTAT PROCEDURE - Abnormal; Notable for the following components:    POC PH 7.306 (*)     POC PCO2 63.3 (*)     POC HCO3 31.6 (*)     POC SATURATED O2 84 (*)     POC Sodium 133 (*)     POC Potassium 5.3 (*)     POC TCO2 33 (*)     POC Hematocrit 33 (*)     All other components within normal limits   POCT GLUCOSE - Abnormal; Notable for the following components:    POCT Glucose 199 (*)     All other components within normal limits   POCT GLUCOSE - Abnormal; Notable for the following components:    POCT Glucose 267 (*)     All other components within normal limits   POCT GLUCOSE - Abnormal; Notable for the following components:    POCT Glucose 358 (*)     All other components within normal limits   POCT GLUCOSE - Abnormal; Notable for the following components:    POCT Glucose 467 (*)     All other components within normal limits   POCT GLUCOSE - Abnormal; Notable for the following components:    POCT Glucose 438 (*)     All other components within normal limits   POCT GLUCOSE - Abnormal; Notable for the following components:    POCT Glucose >500 (*)     All other components within normal limits   MAGNESIUM   SARS-COV-2 RDRP GENE    Narrative:     This test utilizes  isothermal nucleic acid amplification   technology to detect the SARS-CoV-2 RdRp nucleic acid segment.   The analytical sensitivity (limit of detection) is 125 genome   equivalents/mL.   A POSITIVE result implies infection with the SARS-CoV-2 virus;   the patient is presumed to be contagious.     A NEGATIVE result means that SARS-CoV-2 nucleic acids are not   present above the limit of detection. A NEGATIVE result should be   treated as presumptive. It does not rule out the possibility of   COVID-19 and should not be the sole basis for treatment decisions.   If COVID-19 is strongly suspected based on clinical and exposure   history, re-testing using an alternate molecular assay should be   considered.   This test is only for use under the Food and Drug   Administration s Emergency Use Authorization (EUA).   Commercial kits are provided by [employer].   Performance characteristics of the EUA have been independently   verified by [facility] Department of   Pathology and Laboratory Medicine.   _________________________________________________________________   The authorized Fact Sheet for Healthcare Providers and the authorized Fact   Sheet for Patients of the ID NOW COVID-19 are available on the FDA   website:     https://www.fda.gov/media/307895/download  https://www.fda.gov/media/277085/download       POCT URINE PREGNANCY   TYPE & SCREEN   PREPARE RBC SOFT        ECG Results          EKG 12-lead (Final result)  Result time 01/18/22 10:03:35    Final result by Interface, [facility] (01/18/22 10:03:35)                 Narrative:    Test Reason : R06.02,    Vent. Rate : 116 BPM     Atrial Rate : 116 BPM     P-R Int : 164 ms          QRS Dur : 078 ms      QT Int : 342 ms       P-R-T Axes : 062 062 081 degrees     QTc Int : 475 ms    Sinus tachycardia  Possible Left atrial enlargement  Abnormal ECG  When compared with ECG of 22-OCT-2021 08:42,  No significant change was found  Confirmed by Phillips  MD, Bruno WARNER ([id number]) on 1/18/2022 10:03:24 AM    Referred By: FELICITA   SELF           Confirmed By:Bruno Phillips MD                            Imaging Results          X-Ray Chest AP Portable (Final result)  Result time 01/18/22 00:00:31    Final result by Dhruv Stephens MD (01/18/22 00:00:31)                 Impression:      1. No significant change in cardiopulmonary status.  2. Stable bilateral prominent patchy and ground-glass infiltrates diffusely the findings could be associated with pneumonia and/or edema.  Follow-up recommended.      Electronically signed by: Dhruv Stephens  Date:    01/18/2022  Time:    00:00             Narrative:    EXAMINATION:  XR CHEST AP PORTABLE    CLINICAL HISTORY:  CHF;    TECHNIQUE:  Single frontal view of the chest was performed.    COMPARISON:  10/22/2021    FINDINGS:  Cardiac silhouette is normal size.  New limb and central venous catheter on the right with tip overlying the SVC.  No significant change.    Bilateral patchy and ground-glass infiltrates diffusely.  Findings are similar to the prior study.  Findings could be associated with pneumonia and/or edema.                                 Medications   magnesium sulfate 2g in water 50mL IVPB (premix) (0 g Intravenous Stopped 1/19/22 0747)   hydrALAZINE (APRESOLINE) 20 mg/mL injection (has no administration in time range)   heparin (porcine) 5,000 unit/mL injection (has no administration in time range)   furosemide injection 60 mg (60 mg Intravenous Given 1/17/22 2344)   vancomycin 1.25 g in dextrose 5% 250 mL IVPB (ready to mix) (0 mg Intravenous Stopped 1/18/22 0701)   insulin detemir U-100 pen 12 Units (12 Units Subcutaneous Given 1/18/22 1814)     Medical Decision Making:   History:   Old Medical Records: I decided to obtain old medical records.  Initial Assessment:   26-year-old female with PMH of ESRD on HD TRS and diabetes presents with acute-onset SOB and hypoxia to 75% on RA, placed on BiPAP upon arrival, she's  hypertensive to 180 systolic, so we will place on nitro gtt. Diffuse rales on exam.   Differential Diagnosis:   ESRD, volume overload, pulmonary edema, CHF, pneumonia, covid, ARDS  Independently Interpreted Test(s):   I have ordered and independently interpreted X-rays - see prior notes.  I have ordered and independently interpreted EKG Reading(s) - see prior notes  Clinical Tests:   Lab Tests: Ordered and Reviewed  Radiological Study: Ordered and Reviewed  Medical Tests: Ordered and Reviewed  ED Management:  Upon arrival, the patient was placed on BiPAP and a nitro drip given her acute respiratory distress, hypoxia, diffuse rales, and hypertension.  The nitrate drip had to get increased it to 220 micro g per minute but the patient was persistently hypertensive > 200 systolic. She appeared more comfortable overall but was still distressed, so we contacted Nephrology for emergent dialysis.  Her VBG on arrival showed pH 7.3 with pCO2 63 bicarb 31. She has a leukocytosis of 18 and baseline anemia with hemoglobin 7.8.  CMP showed impaired renal function with creatinine 7.4 but no electrolyte derangement that is significant.  Troponin elevated at 0.074.  BNP elevated at 1700. Pt admitted to the ICU. No clear trigger of the patient's pulmonary edema. She remains hypertensive but hemodynamically stable. Pending HD still. Mother came bedside and patient and mother agree with the plan.             Attending Attestation:   Physician Attestation Statement for Resident:  As the supervising MD   Physician Attestation Statement: I have personally seen and examined this patient.   I agree with the above history. -:   As the supervising MD I agree with the above PE.    As the supervising MD I agree with the above treatment, course, plan, and disposition.        Attending Critical Care:   Critical Care Times:   Direct Patient Care (initial evaluation, reassessments, and time considering the  case)................................................................30 minutes.   Ordering, Reviewing, and Interpreting Diagnostic Studies...............................................................................................................10 minutes.   Documentation..................................................................................................................................................................................2 minutes.   Consultation with other Physicians. .................................................................................................................................................15 minutes.   ==============================================================  · Total Critical Care Time - exclusive of procedural time: 57 minutes.  ==============================================================  Critical care was necessary to treat or prevent imminent or life-threatening deterioration of the following conditions: congestive heart failure.   Critical care was time spent personally by me on the following activities: obtaining history from patient or relative, examination of patient, review of x-rays / CT sent with the patient, review of old charts, re-evaluation of patient's conition, discussion with consultants, evaluation of patient's response to treatment, ordering and performing treatments and interventions, development of treatment plan with patient or relative and ordering lab, x-rays, and/or EKG.   Critical Care Condition: potentially life-threatening       Attending ED Notes:   STAFF ATTENDING PHYSICIAN NOTE:  I have individually/jointly evaluated Isabela Read and discussed their ED management with the resident physician. I have also reviewed their notes, assessments, and procedures documented.  I was present during all critical portions of any procedure(s) performed on Isabela Read.   ____________________  Valerio Amaya MD,  FAAEM  Emergency Medicine Staff                 Clinical Impression:   Final diagnoses:  [R06.02] Shortness of breath  [J81.0] Acute pulmonary edema (Primary)  [N18.6] End stage renal disease  [R34] Oliguria          ED Disposition Condition    Admit               Chris King MD  Resident  01/18/22 0424       Ham Amaya MD  01/21/22 3186

## 2022-01-18 NOTE — SUBJECTIVE & OBJECTIVE
Past Medical History:   Diagnosis Date    Anemia     Chronic hypertension with exacerbation during pregnancy in second trimester 11/6/2020    Current regimen (11/6/20):  - Carvedilol 12.5 mg BID - Nifedipine 30 mg daily Baseline CKD + proteinuria    Chronic kidney disease     Depression     Diabetes mellitus     Diarrhea     Encounter for blood transfusion     Gastroparesis     Hepatomegaly 4/29/2021    Hx of psychiatric care     Hyperlipidemia     Hypertension     Nephrotic syndrome     Nephrotic syndrome 3/4/2021    Palpitations     Poor fetal growth affecting management of mother in second trimester 10/15/2020    Restrictive lung disease     Severe pre-eclampsia in second trimester 11/6/2020       Past Surgical History:   Procedure Laterality Date    AV FISTULA PLACEMENT Left 4/7/2021    Procedure: CREATION, AV FISTULA;  Surgeon: Roberto Ryan MD;  Location: [facility] OR [address] [place] [address];  Service: Peripheral Vascular;  Laterality: Left;    FISTULOGRAM N/A 8/11/2021    Procedure: Fistulogram;  Surgeon: Roberto Ryan MD;  Location: [facility] CATH LAB;  Service: Cardiology;  Laterality: N/A;    PERCUTANEOUS TRANSLUMINAL ANGIOPLASTY OF ARTERIOVENOUS FISTULA N/A 8/11/2021    Procedure: PTA, AV FISTULA;  Surgeon: Roberto Ryan MD;  Location: [facility] CATH LAB;  Service: Cardiology;  Laterality: N/A;    REVISION OF ARTERIOVENOUS FISTULA Left 12/10/2021    Procedure: REVISION, AV FISTULA with BVT;  Surgeon: Roberto Ryan MD;  Location: [facility] OR [address] [place] [address];  Service: Peripheral Vascular;  Laterality: Left;       Review of patient's allergies indicates:  No Known Allergies    Family History     Problem Relation (Age of Onset)    Diabetes Brother    Heart disease Father    Hypertension Mother        Tobacco Use    Smoking status: Never Smoker    Smokeless tobacco: Never Used   Substance and Sexual Activity    Alcohol use: No    Drug use: No    Sexual activity: Not Currently     Partners: Male      Comment: monogamous      Review of Systems   Constitutional: Positive for chills. Negative for fever.   HENT: Negative for sore throat.    Respiratory: Positive for shortness of breath. Negative for cough.    Cardiovascular: Negative for chest pain and leg swelling.   Gastrointestinal: Negative for abdominal pain, constipation, diarrhea, nausea and vomiting.   Genitourinary: Negative for dysuria.   Musculoskeletal: Negative for myalgias.   Skin: Negative for rash.   Neurological: Negative for dizziness and headaches.   Psychiatric/Behavioral: Negative for confusion.     Objective:     Vital Signs (Most Recent):  Temp: 97.4 °F (36.3 °C) (01/17/22 2320)  Pulse: 108 (01/18/22 0132)  Resp: (!) 29 (01/18/22 0132)  BP: (!) 197/97 (01/18/22 0132)  SpO2: 99 % (01/18/22 0132) Vital Signs (24h Range):  Temp:  [97.4 °F (36.3 °C)] 97.4 °F (36.3 °C)  Pulse:  [108-120] 108  Resp:  [25-42] 29  SpO2:  [99 %-100 %] 99 %  BP: (174-231)/() 197/97   Weight: 64.4 kg (142 lb)  Body mass index is 24.37 kg/m².    No intake or output data in the 24 hours ending 01/18/22 0135    Physical Exam  HENT:      Head: Normocephalic and atraumatic.      Mouth/Throat:      Mouth: Mucous membranes are moist.      Pharynx: Oropharynx is clear.   Eyes:      Extraocular Movements: Extraocular movements intact.      Pupils: Pupils are equal, round, and reactive to light.   Cardiovascular:      Rate and Rhythm: Normal rate and regular rhythm.      Pulses: Normal pulses.      Heart sounds: Normal heart sounds.   Pulmonary:      Effort: Respiratory distress present.      Breath sounds: Rales present.   Abdominal:      General: Abdomen is flat. Bowel sounds are normal.      Palpations: Abdomen is soft.   Musculoskeletal:         General: No swelling.      Cervical back: Neck supple.   Skin:     General: Skin is warm and dry.      Capillary Refill: Capillary refill takes less than 2 seconds.   Neurological:      General: No focal deficit present.       Mental Status: She is alert and oriented to person, place, and time. Mental status is at baseline.   Psychiatric:         Mood and Affect: Mood normal.         Behavior: Behavior normal.         Thought Content: Thought content normal.         Judgment: Judgment normal.         Vents:  Oxygen Concentration (%): 50 (01/17/22 2331)  Lines/Drains/Airways     Central Venous Catheter Line                 Hemodialysis Catheter right subclavian -- days          Drain                 Hemodialysis AV Fistula Left upper arm -- days          Peripheral Intravenous Line                 Peripheral IV - Single Lumen 01/17/22 18 G Right Antecubital 1 day         Peripheral IV - Single Lumen 01/17/22 2342 20 G Right Hand <1 day              Significant Labs:    CBC/Anemia Profile:  Recent Labs   Lab 01/17/22 2328 01/17/22 2339   WBC  --  18.13*   HGB  --  7.8*   HCT 33* 24.9*   PLT  --  341   MCV  --  102*   RDW  --  16.2*        Chemistries:  Recent Labs   Lab 01/17/22 2339   *   K 5.0   CL 95   CO2 23   BUN 37*   CREATININE 7.4*   CALCIUM 8.7   ALBUMIN 3.2*   PROT 7.6   BILITOT 0.5   ALKPHOS 108   ALT 16   AST 29   MG 2.4   PHOS 5.0*       ABGs:   Recent Labs   Lab 01/17/22 2328   PH 7.306*   PCO2 63.3*   HCO3 31.6*   POCSATURATED 84*   BE 5     Blood Culture: No results for input(s): LABBLOO in the last 48 hours.  BMP:   Recent Labs   Lab 01/17/22 2339   *   *   K 5.0   CL 95   CO2 23   BUN 37*   CREATININE 7.4*   CALCIUM 8.7   MG 2.4     CMP:   Recent Labs   Lab 01/17/22 2339   *   K 5.0   CL 95   CO2 23   *   BUN 37*   CREATININE 7.4*   CALCIUM 8.7   PROT 7.6   ALBUMIN 3.2*   BILITOT 0.5   ALKPHOS 108   AST 29   ALT 16   ANIONGAP 14   EGFRNONAA 6.9*     Lactic Acid:   Recent Labs   Lab 01/17/22 2339   LACTATE 2.4*     Troponin:   Recent Labs   Lab 01/17/22 2339   TROPONINI 0.074*     Urine Culture: No results for input(s): LABURIN in the last 48 hours.  Urine Studies: No results for  input(s): COLORU, APPEARANCEUA, PHUR, SPECGRAV, PROTEINUA, GLUCUA, KETONESU, BILIRUBINUA, OCCULTUA, NITRITE, UROBILINOGEN, LEUKOCYTESUR, RBCUA, WBCUA, BACTERIA, SQUAMEPITHEL, HYALINECASTS in the last 48 hours.    Invalid input(s): WRIGHTSUR  All pertinent labs within the past 24 hours have been reviewed.    Significant Imaging: I have reviewed all pertinent imaging results/findings within the past 24 hours.     EXAMINATION:   XR CHEST AP PORTABLE     CLINICAL HISTORY:   CHF;     TECHNIQUE:   Single frontal view of the chest was performed.     COMPARISON:   10/22/2021     FINDINGS:   Cardiac silhouette is normal size.  New limb and central venous catheter on the right with tip overlying the SVC.  No significant change.     Bilateral patchy and ground-glass infiltrates diffusely.  Findings are similar to the prior study.  Findings could be associated with pneumonia and/or edema.    Impression:       1. No significant change in cardiopulmonary status.   2. Stable bilateral prominent patchy and ground-glass infiltrates diffusely the findings could be associated with pneumonia and/or edema.  Follow-up recommended.

## 2022-01-18 NOTE — PROGRESS NOTES
01/18/22 0237   During Hemodialysis Assessment   Blood Flow Rate (mL/min) 400 mL/min   Dialysate Flow Rate (mL/min) 800 ml/min   Ultrafiltration Rate (mL/Hr) 1160 mL/Hr   Arteriovenous Lines Secure Yes   Arterial Pressure (mmHg) -140 mmHg   Venous Pressure (mmHg) 130   Blood Volume Processed (Liters) 0 L   UF Removed (mL) 0 mL   TMP 70   Venous Line in Air Detector Yes   Intake (mL) 250 mL   Intra-Hemodialysis Comments HD initiated   HD started per MD order

## 2022-01-18 NOTE — ASSESSMENT & PLAN NOTE
-- WBC 18 on presentation  -- No localized s/sx of infection  -- F/u Bcx  -- Trend WBC  -- Consider empiric broad spectrum abx if worsening

## 2022-01-18 NOTE — RESIDENT HANDOFF
Handoff     [place] [facility] [place] [facility] [place] [place] 2 Room Number: ED 01/01     Patient Name: Isabela Read MRN: [id number]     Date of Birth: [id number] Allergies: Patient has no known allergies.     Age: 26 y.o. Admit Date: 1/17/2022     Sex: female  BMI: Body mass index is 24.37 kg/m².     Code Status: Full Code        Illness Level (current clinical status): Watcher - Yes    Reason for Admission: Pulmonary Edema/Volume Overload  Brief HPI (pertinent PMH and diagnosis or differential diagnosis): 26 y.o. F with history of ESRD on HD (TTS) 2/2 T1DM, HTN, who presents with SOB. Reports receiving full treatment HD on Saturday, and tolerated well. Worsening SOB over last 2 days. Worse with lying flat and on exertion. Reports similar episodes occurring twice before, most recently in September, in which she was hospitalized with SOB 2/2 pulmonary edema between dialysis sessions. Reports making urine. Does not take diuretics. Denies fever, CP, cough, abdominal pain, N/V/D/C, dysuria. Some chills. Per EMS, SpO2 75% on RA. Arrived to ED on CPAP.        Hospital Course (updated, brief assessment by system or problem, significant events): Dialyzed overnight, weaned off BiPaP and nitro drip.    Tasks (specific, using if-then statements): Continue to diurese, continue home htn, f/u blood/Ucx    Contingency Plan (special circumstances anticipated and plan): None    Estimated Discharge Date: Past today at midnight    Discharge Disposition: Home or Self Care

## 2022-01-19 VITALS
RESPIRATION RATE: 10 BRPM | WEIGHT: 142 LBS | SYSTOLIC BLOOD PRESSURE: 140 MMHG | BODY MASS INDEX: 24.37 KG/M2 | HEART RATE: 90 BPM | DIASTOLIC BLOOD PRESSURE: 80 MMHG | TEMPERATURE: 99 F | OXYGEN SATURATION: 97 %

## 2022-01-19 PROBLEM — D72.829 LEUKOCYTOSIS: Status: RESOLVED | Noted: 2022-01-18 | Resolved: 2022-01-19

## 2022-01-19 PROBLEM — I16.1 HYPERTENSIVE EMERGENCY: Status: RESOLVED | Noted: 2021-03-04 | Resolved: 2022-01-19

## 2022-01-19 PROBLEM — J81.0 ACUTE PULMONARY EDEMA: Status: RESOLVED | Noted: 2021-10-22 | Resolved: 2022-01-19

## 2022-01-19 PROBLEM — I16.0 HYPERTENSIVE URGENCY: Status: RESOLVED | Noted: 2022-01-18 | Resolved: 2022-01-19

## 2022-01-19 PROBLEM — J96.02 ACUTE RESPIRATORY FAILURE WITH HYPOXIA AND HYPERCARBIA: Status: RESOLVED | Noted: 2022-01-18 | Resolved: 2022-01-19

## 2022-01-19 PROBLEM — J96.01 ACUTE RESPIRATORY FAILURE WITH HYPOXIA AND HYPERCARBIA: Status: RESOLVED | Noted: 2022-01-18 | Resolved: 2022-01-19

## 2022-01-19 LAB
ALBUMIN SERPL BCP-MCNC: 2.4 G/DL (ref 3.5–5.2)
ANION GAP SERPL CALC-SCNC: 8 MMOL/L (ref 8–16)
ANION GAP SERPL CALC-SCNC: 9 MMOL/L (ref 8–16)
BASOPHILS # BLD AUTO: 0.02 K/UL (ref 0–0.2)
BASOPHILS NFR BLD: 0.2 % (ref 0–1.9)
BUN SERPL-MCNC: 14 MG/DL (ref 6–20)
BUN SERPL-MCNC: 7 MG/DL (ref 6–20)
CALCIUM SERPL-MCNC: 8.1 MG/DL (ref 8.7–10.5)
CALCIUM SERPL-MCNC: 8.3 MG/DL (ref 8.7–10.5)
CHLORIDE SERPL-SCNC: 100 MMOL/L (ref 95–110)
CHLORIDE SERPL-SCNC: 101 MMOL/L (ref 95–110)
CO2 SERPL-SCNC: 26 MMOL/L (ref 23–29)
CO2 SERPL-SCNC: 27 MMOL/L (ref 23–29)
CREAT SERPL-MCNC: 2.3 MG/DL (ref 0.5–1.4)
CREAT SERPL-MCNC: 2.8 MG/DL (ref 0.5–1.4)
DIFFERENTIAL METHOD: ABNORMAL
EOSINOPHIL # BLD AUTO: 0.1 K/UL (ref 0–0.5)
EOSINOPHIL NFR BLD: 1.5 % (ref 0–8)
ERYTHROCYTE [DISTWIDTH] IN BLOOD BY AUTOMATED COUNT: 16.8 % (ref 11.5–14.5)
EST. GFR  (AFRICAN AMERICAN): 25.9 ML/MIN/1.73 M^2
EST. GFR  (AFRICAN AMERICAN): 32.8 ML/MIN/1.73 M^2
EST. GFR  (NON AFRICAN AMERICAN): 22.4 ML/MIN/1.73 M^2
EST. GFR  (NON AFRICAN AMERICAN): 28.5 ML/MIN/1.73 M^2
FOLATE SERPL-MCNC: 4.6 NG/ML (ref 4–24)
GLUCOSE SERPL-MCNC: 174 MG/DL (ref 70–110)
GLUCOSE SERPL-MCNC: 237 MG/DL (ref 70–110)
HCT VFR BLD AUTO: 22.3 % (ref 37–48.5)
HGB BLD-MCNC: 7.1 G/DL (ref 12–16)
IMM GRANULOCYTES # BLD AUTO: 0.03 K/UL (ref 0–0.04)
IMM GRANULOCYTES NFR BLD AUTO: 0.3 % (ref 0–0.5)
LYMPHOCYTES # BLD AUTO: 2 K/UL (ref 1–4.8)
LYMPHOCYTES NFR BLD: 21.3 % (ref 18–48)
MAGNESIUM SERPL-MCNC: 1.9 MG/DL (ref 1.6–2.6)
MAGNESIUM SERPL-MCNC: 2.6 MG/DL (ref 1.6–2.6)
MCH RBC QN AUTO: 32 PG (ref 27–31)
MCHC RBC AUTO-ENTMCNC: 31.8 G/DL (ref 32–36)
MCV RBC AUTO: 101 FL (ref 82–98)
MONOCYTES # BLD AUTO: 0.5 K/UL (ref 0.3–1)
MONOCYTES NFR BLD: 4.9 % (ref 4–15)
NEUTROPHILS # BLD AUTO: 6.7 K/UL (ref 1.8–7.7)
NEUTROPHILS NFR BLD: 71.8 % (ref 38–73)
NRBC BLD-RTO: 0 /100 WBC
PHOSPHATE SERPL-MCNC: 2.8 MG/DL (ref 2.7–4.5)
PHOSPHATE SERPL-MCNC: 3 MG/DL (ref 2.7–4.5)
PLATELET # BLD AUTO: 205 K/UL (ref 150–450)
PMV BLD AUTO: 11.2 FL (ref 9.2–12.9)
POCT GLUCOSE: 143 MG/DL (ref 70–110)
POCT GLUCOSE: 172 MG/DL (ref 70–110)
POCT GLUCOSE: 176 MG/DL (ref 70–110)
POCT GLUCOSE: 186 MG/DL (ref 70–110)
POCT GLUCOSE: 194 MG/DL (ref 70–110)
POCT GLUCOSE: 222 MG/DL (ref 70–110)
POCT GLUCOSE: 224 MG/DL (ref 70–110)
POCT GLUCOSE: 250 MG/DL (ref 70–110)
POCT GLUCOSE: 367 MG/DL (ref 70–110)
POCT GLUCOSE: 408 MG/DL (ref 70–110)
POTASSIUM SERPL-SCNC: 4.2 MMOL/L (ref 3.5–5.1)
POTASSIUM SERPL-SCNC: 4.5 MMOL/L (ref 3.5–5.1)
RBC # BLD AUTO: 2.22 M/UL (ref 4–5.4)
SODIUM SERPL-SCNC: 135 MMOL/L (ref 136–145)
SODIUM SERPL-SCNC: 136 MMOL/L (ref 136–145)
VANCOMYCIN SERPL-MCNC: 17.4 UG/ML
VIT B12 SERPL-MCNC: 321 PG/ML (ref 210–950)
WBC # BLD AUTO: 9.36 K/UL (ref 3.9–12.7)

## 2022-01-19 PROCEDURE — 82607 VITAMIN B-12: CPT | Mod: NTX | Performed by: HOSPITALIST

## 2022-01-19 PROCEDURE — 99232 PR SUBSEQUENT HOSPITAL CARE,LEVL II: ICD-10-PCS | Mod: NTX,,, | Performed by: NURSE PRACTITIONER

## 2022-01-19 PROCEDURE — 99233 PR SUBSEQUENT HOSPITAL CARE,LEVL III: ICD-10-PCS | Mod: NTX,,, | Performed by: NURSE PRACTITIONER

## 2022-01-19 PROCEDURE — 63600175 PHARM REV CODE 636 W HCPCS: Mod: NTX | Performed by: STUDENT IN AN ORGANIZED HEALTH CARE EDUCATION/TRAINING PROGRAM

## 2022-01-19 PROCEDURE — 83735 ASSAY OF MAGNESIUM: CPT | Mod: 91,NTX | Performed by: NURSE PRACTITIONER

## 2022-01-19 PROCEDURE — 99232 SBSQ HOSP IP/OBS MODERATE 35: CPT | Mod: NTX,,, | Performed by: NURSE PRACTITIONER

## 2022-01-19 PROCEDURE — 25000003 PHARM REV CODE 250: Mod: NTX | Performed by: INTERNAL MEDICINE

## 2022-01-19 PROCEDURE — 85025 COMPLETE CBC W/AUTO DIFF WBC: CPT | Mod: NTX | Performed by: STUDENT IN AN ORGANIZED HEALTH CARE EDUCATION/TRAINING PROGRAM

## 2022-01-19 PROCEDURE — 94761 N-INVAS EAR/PLS OXIMETRY MLT: CPT | Mod: NTX

## 2022-01-19 PROCEDURE — 80048 BASIC METABOLIC PNL TOTAL CA: CPT | Mod: NTX | Performed by: STUDENT IN AN ORGANIZED HEALTH CARE EDUCATION/TRAINING PROGRAM

## 2022-01-19 PROCEDURE — C9399 UNCLASSIFIED DRUGS OR BIOLOG: HCPCS | Mod: NTX | Performed by: STUDENT IN AN ORGANIZED HEALTH CARE EDUCATION/TRAINING PROGRAM

## 2022-01-19 PROCEDURE — 94760 N-INVAS EAR/PLS OXIMETRY 1: CPT | Mod: NTX

## 2022-01-19 PROCEDURE — 82746 ASSAY OF FOLIC ACID SERUM: CPT | Mod: NTX | Performed by: HOSPITALIST

## 2022-01-19 PROCEDURE — 63600175 PHARM REV CODE 636 W HCPCS: Mod: NTX | Performed by: HOSPITALIST

## 2022-01-19 PROCEDURE — 63600175 PHARM REV CODE 636 W HCPCS: Mod: NTX | Performed by: NURSE PRACTITIONER

## 2022-01-19 PROCEDURE — 99900035 HC TECH TIME PER 15 MIN (STAT): Mod: NTX

## 2022-01-19 PROCEDURE — 83735 ASSAY OF MAGNESIUM: CPT | Mod: NTX | Performed by: STUDENT IN AN ORGANIZED HEALTH CARE EDUCATION/TRAINING PROGRAM

## 2022-01-19 PROCEDURE — 80202 ASSAY OF VANCOMYCIN: CPT | Mod: NTX | Performed by: INTERNAL MEDICINE

## 2022-01-19 PROCEDURE — 84100 ASSAY OF PHOSPHORUS: CPT | Mod: NTX | Performed by: STUDENT IN AN ORGANIZED HEALTH CARE EDUCATION/TRAINING PROGRAM

## 2022-01-19 PROCEDURE — 80069 RENAL FUNCTION PANEL: CPT | Mod: NTX | Performed by: NURSE PRACTITIONER

## 2022-01-19 PROCEDURE — 27100171 HC OXYGEN HIGH FLOW UP TO 24 HOURS: Mod: NTX

## 2022-01-19 PROCEDURE — 27200966 HC CLOSED SUCTION SYSTEM: Mod: NTX

## 2022-01-19 PROCEDURE — 99233 SBSQ HOSP IP/OBS HIGH 50: CPT | Mod: GC,NTX,, | Performed by: INTERNAL MEDICINE

## 2022-01-19 PROCEDURE — 99233 SBSQ HOSP IP/OBS HIGH 50: CPT | Mod: NTX,,, | Performed by: NURSE PRACTITIONER

## 2022-01-19 PROCEDURE — 99233 PR SUBSEQUENT HOSPITAL CARE,LEVL III: ICD-10-PCS | Mod: GC,NTX,, | Performed by: INTERNAL MEDICINE

## 2022-01-19 PROCEDURE — 25000003 PHARM REV CODE 250: Mod: NTX | Performed by: STUDENT IN AN ORGANIZED HEALTH CARE EDUCATION/TRAINING PROGRAM

## 2022-01-19 PROCEDURE — 27000221 HC OXYGEN, UP TO 24 HOURS: Mod: NTX

## 2022-01-19 PROCEDURE — 25000003 PHARM REV CODE 250: Mod: NTX | Performed by: HOSPITALIST

## 2022-01-19 RX ADMIN — HYDRALAZINE HYDROCHLORIDE 50 MG: 50 TABLET ORAL at 05:01

## 2022-01-19 RX ADMIN — SEVELAMER CARBONATE 1600 MG: 800 TABLET, FILM COATED ORAL at 11:01

## 2022-01-19 RX ADMIN — INSULIN DETEMIR 10 UNITS: 100 INJECTION, SOLUTION SUBCUTANEOUS at 08:01

## 2022-01-19 RX ADMIN — INSULIN ASPART 10 UNITS: 100 INJECTION, SOLUTION INTRAVENOUS; SUBCUTANEOUS at 04:01

## 2022-01-19 RX ADMIN — NIFEDIPINE 60 MG: 30 TABLET, FILM COATED, EXTENDED RELEASE ORAL at 08:01

## 2022-01-19 RX ADMIN — CARVEDILOL 25 MG: 25 TABLET, FILM COATED ORAL at 08:01

## 2022-01-19 RX ADMIN — FERROUS SULFATE TAB EC 325 MG (65 MG FE EQUIVALENT) 1 EACH: 325 (65 FE) TABLET DELAYED RESPONSE at 08:01

## 2022-01-19 RX ADMIN — ESCITALOPRAM OXALATE 20 MG: 5 TABLET, FILM COATED ORAL at 08:01

## 2022-01-19 RX ADMIN — MUPIROCIN: 20 OINTMENT TOPICAL at 08:01

## 2022-01-19 RX ADMIN — SEVELAMER CARBONATE 1600 MG: 800 TABLET, FILM COATED ORAL at 04:01

## 2022-01-19 RX ADMIN — HYDRALAZINE HYDROCHLORIDE 50 MG: 50 TABLET ORAL at 02:01

## 2022-01-19 RX ADMIN — INSULIN ASPART 5 UNITS: 100 INJECTION, SOLUTION INTRAVENOUS; SUBCUTANEOUS at 08:01

## 2022-01-19 RX ADMIN — SEVELAMER CARBONATE 1600 MG: 800 TABLET, FILM COATED ORAL at 08:01

## 2022-01-19 RX ADMIN — INSULIN ASPART 2 UNITS: 100 INJECTION, SOLUTION INTRAVENOUS; SUBCUTANEOUS at 04:01

## 2022-01-19 RX ADMIN — CALCITRIOL CAPSULES 0.25 MCG 0.5 MCG: 0.25 CAPSULE ORAL at 08:01

## 2022-01-19 RX ADMIN — INSULIN ASPART 10 UNITS: 100 INJECTION, SOLUTION INTRAVENOUS; SUBCUTANEOUS at 11:01

## 2022-01-19 RX ADMIN — HEPARIN SODIUM 5000 UNITS: 5000 INJECTION INTRAVENOUS; SUBCUTANEOUS at 05:01

## 2022-01-19 RX ADMIN — INSULIN ASPART 5 UNITS: 100 INJECTION, SOLUTION INTRAVENOUS; SUBCUTANEOUS at 11:01

## 2022-01-19 RX ADMIN — HEPARIN SODIUM 5000 UNITS: 5000 INJECTION INTRAVENOUS; SUBCUTANEOUS at 02:01

## 2022-01-19 RX ADMIN — MAGNESIUM SULFATE IN WATER 2 G: 40 INJECTION, SOLUTION INTRAVENOUS at 05:01

## 2022-01-19 NOTE — SUBJECTIVE & OBJECTIVE
Interval History:   SLED completed overnight, negative 1.2L with UF.  On room air on exam today, no complaints of dyspnea.    Electrolytes stable.    Review of patient's allergies indicates:  No Known Allergies  Current Facility-Administered Medications   Medication Frequency    0.9%  NaCl infusion (CRRT USE ONLY) Continuous    0.9%  NaCl infusion (for blood administration) Q24H PRN    0.9%  NaCl infusion Once    acetaminophen tablet 650 mg Q4H PRN    atorvastatin tablet 80 mg QHS    calcitRIOL capsule 0.5 mcg Daily    carvediloL tablet 25 mg BID    EScitalopram oxalate tablet 20 mg Daily    ferrous sulfate tablet 1 each Daily    furosemide injection 120 mg Once    glucagon (human recombinant) injection 1 mg PRN    glucose chewable tablet 16 g PRN    glucose chewable tablet 24 g PRN    heparin (porcine) injection 1,000 Units PRN    heparin (porcine) injection 5,000 Units Q8H    hydrALAZINE tablet 50 mg Q8H    insulin aspart U-100 pen 0-5 Units QID (AC + HS) PRN    insulin aspart U-100 pen 10 Units TIDWM    insulin detemir U-100 pen 10 Units Daily    insulin detemir U-100 pen 12 Units QHS    magnesium sulfate 2g in water 50mL IVPB (premix) PRN    melatonin tablet 6 mg Nightly PRN    mupirocin 2 % ointment BID    NIFEdipine 24 hr tablet 60 mg Daily    ondansetron injection 4 mg Q6H PRN    sevelamer carbonate tablet 1,600 mg TID WM    sodium chloride 0.9% bolus 250 mL PRN    sodium chloride 0.9% flush 10 mL PRN    traZODone tablet 50 mg Nightly PRN     Facility-Administered Medications Ordered in Other Encounters   Medication Frequency    0.9%  NaCl infusion Continuous       Objective:     Vital Signs (Most Recent):  Temp: 98.9 °F (37.2 °C) (01/19/22 0725)  Pulse: 98 (01/19/22 1000)  Resp: 13 (01/19/22 1000)  BP: (!) 145/79 (01/19/22 1000)  SpO2: 96 % (01/19/22 1000)  O2 Device (Oxygen Therapy): room air (01/19/22 0915) Vital Signs (24h Range):  Temp:  [97.9 °F (36.6 °C)-98.9 °F (37.2 °C)]  98.9 °F (37.2 °C)  Pulse:  [] 98  Resp:  [5-35] 13  SpO2:  [91 %-100 %] 96 %  BP: (118-179)/() 145/79     Weight: 64.4 kg (142 lb) (01/17/22 2320)  Body mass index is 24.37 kg/m².  Body surface area is 1.71 meters squared.    I/O last 3 completed shifts:  In: 2194.8 [I.V.:1694.8; Other:500]  Out: 5411 [Other:5411]    Physical Exam  Vitals and nursing note reviewed.   Constitutional:       General: She is not in acute distress.     Appearance: Normal appearance. She is not ill-appearing.   HENT:      Head: Normocephalic and atraumatic.      Nose: No congestion or rhinorrhea.      Mouth/Throat:      Mouth: Mucous membranes are moist.      Pharynx: Oropharynx is clear. No oropharyngeal exudate or posterior oropharyngeal erythema.   Eyes:      Extraocular Movements: Extraocular movements intact.      Conjunctiva/sclera: Conjunctivae normal.   Cardiovascular:      Rate and Rhythm: Normal rate and regular rhythm.      Heart sounds: No murmur heard.  No friction rub.   Pulmonary:      Effort: Pulmonary effort is normal. No respiratory distress.      Breath sounds: Rales present. No wheezing.   Abdominal:      General: There is no distension.      Palpations: Abdomen is soft.   Musculoskeletal:      Cervical back: Normal range of motion and neck supple.      Right lower leg: No edema.      Left lower leg: No edema.   Skin:     General: Skin is warm and dry.   Neurological:      Mental Status: She is alert.   Psychiatric:         Mood and Affect: Mood normal.         Behavior: Behavior normal.         Significant Labs:  CBC:   Recent Labs   Lab 01/19/22  0324   WBC 9.36   RBC 2.22*   HGB 7.1*   HCT 22.3*      *   MCH 32.0*   MCHC 31.8*     CMP:   Recent Labs   Lab 01/18/22  1323 01/18/22  1810 01/18/22  2249 01/18/22  2249 01/19/22  0324   *   < > 149*  150*   < > 174*   CALCIUM 8.4*   < > 8.4*  7.8*   < > 8.1*   ALBUMIN 2.9*   < > 2.4*  --   --    PROT 6.1  --   --   --   --    *   <  > 136  135*   < > 135*   K 5.0   < > 4.5  4.5   < > 4.5   CO2 22*   < > 27  29   < > 26   CL 93*   < > 100  99   < > 101   BUN 19   < > 11  10   < > 7   CREATININE 4.9*   < > 3.0*  2.9*   < > 2.3*   ALKPHOS 102  --   --   --   --    ALT 13  --   --   --   --    AST 18  --   --   --   --    BILITOT 0.8  --   --   --   --     < > = values in this interval not displayed.

## 2022-01-19 NOTE — SUBJECTIVE & OBJECTIVE
Interval History/Significant Events: Dialysis without issue overnight, transitioned off drip to home insulin reg, NAEON    Review of Systems   Constitutional: Negative for chills and fever.   HENT: Negative for congestion and drooling.    Respiratory: Negative for cough and shortness of breath.    Cardiovascular: Negative for chest pain.   Gastrointestinal: Negative for abdominal pain and vomiting.   Genitourinary: Negative for dysuria.   Musculoskeletal: Negative for myalgias.   Skin: Negative for rash and wound.   Neurological: Negative for light-headedness and headaches.     Objective:     Vital Signs (Most Recent):  Temp: 98.8 °F (37.1 °C) (01/19/22 1115)  Pulse: 90 (01/19/22 1200)  Resp: 18 (01/19/22 1200)  BP: (!) 156/62 (01/19/22 1200)  SpO2: (!) 94 % (01/19/22 1200) Vital Signs (24h Range):  Temp:  [98.4 °F (36.9 °C)-98.9 °F (37.2 °C)] 98.8 °F (37.1 °C)  Pulse:  [86-98] 90  Resp:  [5-35] 18  SpO2:  [91 %-100 %] 94 %  BP: (118-158)/(62-92) 156/62   Weight: 64.4 kg (142 lb)  Body mass index is 24.37 kg/m².      Intake/Output Summary (Last 24 hours) at 1/19/2022 1515  Last data filed at 1/19/2022 0915  Gross per 24 hour   Intake 2269.95 ml   Output 2911 ml   Net -641.05 ml       Physical Exam  Vitals and nursing note reviewed.   Constitutional:       Comments: Awake, talkative   HENT:      Head: Normocephalic.      Nose: No rhinorrhea.      Mouth/Throat:      Mouth: Mucous membranes are moist.   Eyes:      General:         Right eye: No discharge.         Left eye: No discharge.   Neck:      Comments: Ranges neck appropriately  Cardiovascular:      Rate and Rhythm: Normal rate and regular rhythm.   Pulmonary:      Effort: Pulmonary effort is normal. No respiratory distress.      Breath sounds: No stridor.   Abdominal:      General: Abdomen is flat. There is no distension.      Palpations: Abdomen is soft.      Tenderness: There is no abdominal tenderness. There is no guarding or rebound.   Musculoskeletal:       Comments: No gross extremity deformity   Skin:     General: Skin is warm and dry.   Neurological:      Mental Status: She is alert and oriented to person, place, and time.      Comments: Cns grossly intact, follows commands         Vents:  Oxygen Concentration (%): 32 (01/19/22 0500)  Lines/Drains/Airways     Central Venous Catheter Line                 Hemodialysis Catheter right subclavian -- days          Drain                 Hemodialysis AV Fistula Left upper arm -- days          Peripheral Intravenous Line                 Peripheral IV - Single Lumen 01/17/22 18 G Right Antecubital 2 days         Peripheral IV - Single Lumen 01/17/22 2342 20 G Right Hand 1 day              Significant Labs:    CBC/Anemia Profile:  Recent Labs   Lab 01/17/22  2339 01/18/22  0605 01/19/22  0324   WBC 18.13* 16.63* 9.36   HGB 7.8* 6.8* 7.1*   HCT 24.9* 21.5* 22.3*    189 205   * 102* 101*   RDW 16.2* 16.4* 16.8*   FOLATE  --   --  4.6   [id number]  --   --  321        Chemistries:  Recent Labs   Lab 01/17/22  2339 01/18/22  0737 01/18/22  1323 01/18/22  1323 01/18/22  1810 01/18/22  2249 01/19/22  0324   *   < > 130*   < > 132* 136  135* 135*   K 5.0   < > 5.0   < > 4.8 4.5  4.5 4.5   CL 95   < > 93*   < > 95 100  99 101   CO2 23   < > 22*   < > 25 27  29 26   BUN 37*   < > 19   < > 18 11  10 7   CREATININE 7.4*   < > 4.9*   < > 4.5* 3.0*  2.9* 2.3*   CALCIUM 8.7   < > 8.4*   < > 8.2* 8.4*  7.8* 8.1*   ALBUMIN 3.2*  --  2.9*  --   --  2.4*  --    PROT 7.6  --  6.1  --   --   --   --    BILITOT 0.5  --  0.8  --   --   --   --    ALKPHOS 108  --  102  --   --   --   --    ALT 16  --  13  --   --   --   --    AST 29  --  18  --   --   --   --    MG 2.4  --  2.1  --   --  2.0 1.9   PHOS 5.0*  --  4.5  --   --  3.6 2.8    < > = values in this interval not displayed.       All pertinent labs within the past 24 hours have been reviewed.    Significant Imaging:  I have reviewed all pertinent imaging  results/findings within the past 24 hours.

## 2022-01-19 NOTE — ASSESSMENT & PLAN NOTE
BG goal 140-180  Pt did not receive scheduled Novolog 10 units with breakfast; likely cause of prandial BG excursions this morning.     Plan:  -Continue Levemir 12 units q HS and 10 units once daily  -Continue Novolog 10 units TID with meals (home dose)  -Low Dose Correction Scale  -BG monitoring ac/hs    ** Please notify Endocrine for any change and/or advance in diet**    Discharge plans: TBD

## 2022-01-19 NOTE — PROGRESS NOTES
[facility]  Nephrology  Progress Note    Patient Name: Isabela Read  MRN: [id number]  Admission Date: 1/17/2022  Hospital Length of Stay: 1 days  Attending Provider: Moustapha Pollard MD   Primary Care Physician: Tavo Bhatia MD  Principal Problem:Acute respiratory failure with hypoxia and hypercarbia    Subjective:     HPI: Isabela Read is a 25 yo female with DM1, HTN, and ESRD on iHD TTS who presented to the ED with chief complaint of SOB.  She last had HD on Saturday in which she reports tolerating well, usually ultrafiltration around 2.8-3.0L/treatment according to patient.  On the early morning of 1/18, she reported worsening SOB without relief.  Her mother called 911 and [place] [facility] [place].  She has had similar episodes like this in the past which resolves with HD.  She reports compliance with fluid restrictions and her HD sessions.  These events usually occur during the weekend when she has an extra day between sessions.  She denies changes in her dietary consumption of sodium.  BP on arrival was 218/136 and she was started on Nitro Gtt and had to be placed on BiPAP for respiratory distress.  Nephrology was consulted for emergent dialysis and she completed early this morning after 2L were removed.  She reports improvement in symptoms post HD, able to be weaned down to 2L NC on evaluation.         26 y.o. F       Interval History:   SLED completed overnight, negative 1.2L with UF.  On room air on exam today, no complaints of dyspnea.    Electrolytes stable.    Review of patient's allergies indicates:  No Known Allergies  Current Facility-Administered Medications   Medication Frequency    0.9%  NaCl infusion (CRRT USE ONLY) Continuous    0.9%  NaCl infusion (for blood administration) Q24H PRN    0.9%  NaCl infusion Once    acetaminophen tablet 650 mg Q4H PRN    atorvastatin tablet 80 mg QHS    calcitRIOL capsule 0.5 mcg Daily     carvediloL tablet 25 mg BID    EScitalopram oxalate tablet 20 mg Daily    ferrous sulfate tablet 1 each Daily    furosemide injection 120 mg Once    glucagon (human recombinant) injection 1 mg PRN    glucose chewable tablet 16 g PRN    glucose chewable tablet 24 g PRN    heparin (porcine) injection 1,000 Units PRN    heparin (porcine) injection 5,000 Units Q8H    hydrALAZINE tablet 50 mg Q8H    insulin aspart U-100 pen 0-5 Units QID (AC + HS) PRN    insulin aspart U-100 pen 10 Units TIDWM    insulin detemir U-100 pen 10 Units Daily    insulin detemir U-100 pen 12 Units QHS    magnesium sulfate 2g in water 50mL IVPB (premix) PRN    melatonin tablet 6 mg Nightly PRN    mupirocin 2 % ointment BID    NIFEdipine 24 hr tablet 60 mg Daily    ondansetron injection 4 mg Q6H PRN    sevelamer carbonate tablet 1,600 mg TID WM    sodium chloride 0.9% bolus 250 mL PRN    sodium chloride 0.9% flush 10 mL PRN    traZODone tablet 50 mg Nightly PRN     Facility-Administered Medications Ordered in Other Encounters   Medication Frequency    0.9%  NaCl infusion Continuous       Objective:     Vital Signs (Most Recent):  Temp: 98.9 °F (37.2 °C) (01/19/22 0725)  Pulse: 98 (01/19/22 1000)  Resp: 13 (01/19/22 1000)  BP: (!) 145/79 (01/19/22 1000)  SpO2: 96 % (01/19/22 1000)  O2 Device (Oxygen Therapy): room air (01/19/22 0915) Vital Signs (24h Range):  Temp:  [97.9 °F (36.6 °C)-98.9 °F (37.2 °C)] 98.9 °F (37.2 °C)  Pulse:  [] 98  Resp:  [5-35] 13  SpO2:  [91 %-100 %] 96 %  BP: (118-179)/() 145/79     Weight: 64.4 kg (142 lb) (01/17/22 2320)  Body mass index is 24.37 kg/m².  Body surface area is 1.71 meters squared.    I/O last 3 completed shifts:  In: 2194.8 [I.V.:1694.8; Other:500]  Out: 5411 [Other:5411]    Physical Exam  Vitals and nursing note reviewed.   Constitutional:       General: She is not in acute distress.     Appearance: Normal appearance. She is not ill-appearing.   HENT:      Head:  Normocephalic and atraumatic.      Nose: No congestion or rhinorrhea.      Mouth/Throat:      Mouth: Mucous membranes are moist.      Pharynx: Oropharynx is clear. No oropharyngeal exudate or posterior oropharyngeal erythema.   Eyes:      Extraocular Movements: Extraocular movements intact.      Conjunctiva/sclera: Conjunctivae normal.   Cardiovascular:      Rate and Rhythm: Normal rate and regular rhythm.      Heart sounds: No murmur heard.  No friction rub.   Pulmonary:      Effort: Pulmonary effort is normal. No respiratory distress.      Breath sounds: Rales present. No wheezing.   Abdominal:      General: There is no distension.      Palpations: Abdomen is soft.   Musculoskeletal:      Cervical back: Normal range of motion and neck supple.      Right lower leg: No edema.      Left lower leg: No edema.   Skin:     General: Skin is warm and dry.   Neurological:      Mental Status: She is alert.   Psychiatric:         Mood and Affect: Mood normal.         Behavior: Behavior normal.         Significant Labs:  CBC:   Recent Labs   Lab 01/19/22  0324   WBC 9.36   RBC 2.22*   HGB 7.1*   HCT 22.3*      *   MCH 32.0*   MCHC 31.8*     CMP:   Recent Labs   Lab 01/18/22  1323 01/18/22  1810 01/18/22  2249 01/18/22  2249 01/19/22  0324   *   < > 149*  150*   < > 174*   CALCIUM 8.4*   < > 8.4*  7.8*   < > 8.1*   ALBUMIN 2.9*   < > 2.4*  --   --    PROT 6.1  --   --   --   --    *   < > 136  135*   < > 135*   K 5.0   < > 4.5  4.5   < > 4.5   CO2 22*   < > 27  29   < > 26   CL 93*   < > 100  99   < > 101   BUN 19   < > 11  10   < > 7   CREATININE 4.9*   < > 3.0*  2.9*   < > 2.3*   ALKPHOS 102  --   --   --   --    ALT 13  --   --   --   --    AST 18  --   --   --   --    BILITOT 0.8  --   --   --   --     < > = values in this interval not displayed.            Assessment/Plan:     * Acute respiratory failure with hypoxia and hypercarbia  -UF with HD    End stage renal failure on  dialysis  ESRD on iHD TTS  [facility]  Dr. Camilo  Minimal residual renal fx    1/18:HD completed early this morning in the ED with 2L net fluid removal.   1/19: 8 hour SLED overnight for additional UF (1.2L)    Plan/Recommendations:  -please obtain standing weight today  -plan for HD tomorrow  -continue strict I/O's  -place on 1L fluid restrictions   -renal diet  -check RFP in AM.    -on renvela.  Monitor phos levels daily        Anemia due to chronic kidney disease, on chronic dialysis  -obtain OP records for EPO dosing  -hgb below goal for ESRD  -EPO  -transfuse with HD if needed        Marky Lora, NP  Nephrology  [facility]

## 2022-01-19 NOTE — ASSESSMENT & PLAN NOTE
Lab Results   Component Value Date    CREATININE 2.3 (H) 01/19/2022     Avoid insulin stacking  Titrate insulin slowly

## 2022-01-19 NOTE — NURSING TRANSFER
Nursing Transfer Note      1/19/2022     Reason patient is being transferred: Patient being discharged    Transfer To: Home with mother at bedside to drive patient home    Transfer via wheelchair    Transfer with patient belongings in hand. Clothing, phone, phone .     Transported by Kristi Garcia     Medicines sent: None    Any special needs or follow-up needed: Follow up appointments     Chart send with patient: No    Notified: patient mother at bedside    Patient reassessed at: 1500 @01/19/2022    Patient per wheelchair escorted by nurse to car. No complications noted. Discharge instructions provided and patient voiced understanding of all instructions. Dialysis line remains intact to right side chest. MD approved dialysis line to remain. PIVs removed. Safety and comfort maintained

## 2022-01-19 NOTE — ASSESSMENT & PLAN NOTE
ESRD on iHD TTS  [facility]  Dr. Camilo  Minimal residual renal fx    1/18:HD completed early this morning in the ED with 2L net fluid removal.   1/19: 8 hour SLED overnight for additional UF (1.2L)    Plan/Recommendations:  -please obtain standing weight today  -plan for HD tomorrow  -continue strict I/O's  -place on 1L fluid restrictions   -renal diet  -check RFP in AM.    -on renvela.  Monitor phos levels daily

## 2022-01-19 NOTE — PROGRESS NOTES
Pharmacokinetic Sign-off: IV Vancomycin    Therapy with Vancomycin complete and/or consult discontinued by provider.    Pharmacy will sign off, please re-consult as needed.    Keyanna Bruno, PharmD  Ext: [phone]

## 2022-01-19 NOTE — PROGRESS NOTES
CRRT alarming high TMP. Pt successfully rinsed back. All blood returned. [facility] aware. Awaiting restart tx.

## 2022-01-19 NOTE — PLAN OF CARE
[facility]  Initial Discharge Assessment       Primary Care Provider: Tavo Bhatia MD    Admission Diagnosis: Mixed hyperlipidemia [E78.2]  Shortness of breath [R06.02]  End stage renal disease [N18.6]  Oliguria [R34]  Acute pulmonary edema [J81.0]  End stage renal failure on dialysis [N18.6, Z99.2]  Type 1 diabetes mellitus with end-stage renal disease (ESRD) [E10.22, N18.6]  Acute respiratory failure with hypoxia and hypercarbia [J96.01, J96.02]    Admission Date: 1/17/2022  Expected Discharge Date: 1/21/2022    Discharge Barriers Identified: None    Payor: [employer] / Plan: [employer] / Product Type: Medicare Advantage /     Extended Emergency Contact Information  Primary Emergency Contact: Kiersten Read  Mobile Phone: [phone]  Relation: Mother  Secondary Emergency Contact: JOSE MARIA MURILLO  Mobile Phone: [phone]  Relation: Spouse  Preferred language: English   needed? No    Discharge Plan A: Home with family  Discharge Plan B: Home      [facility] [address] [place] [address] [address] [facility]  [address] [place] [id number]  Phone: [phone] Fax: [phone]      Initial Assessment (most recent)     Adult Discharge Assessment - 01/19/22 1030        Discharge Assessment    Assessment Type Discharge Planning Assessment     Confirmed/corrected address, phone number and insurance Yes     Confirmed Demographics Correct on Facesheet     Source of Information patient     Communicated TITA with patient/caregiver Date not available/Unable to determine     Reason For Admission Mixed hyperlipidemia     Lives With parent(s)     Do you expect to return to your current living situation? Yes     Do you have help at home or someone to help you manage your care at home? Yes     Who are your caregiver(s) and their phone number(s)? Kiersten Read [phone]     Home Layout Bedroom on 2nd floor   The patient has  four steps to enter her home and 18 to walk up to enter her bedroom.    Equipment Currently Used at Home none     Readmission within 30 days? No     Patient currently being followed by outpatient case management? No     Do you currently have service(s) that help you manage your care at home? No     Do you take prescription medications? Yes     Who is going to help you get home at discharge? Kiersten Read     How do you get to doctors appointments? family or friend will provide;car, drives self     Are you on dialysis? Yes     Dialysis Name and Scheduled days [facility] Tuesdays, Thursdays, and Saturdays     Do you take coumadin? No     Discharge Plan A Home with family     Discharge Plan B Home     DME Needed Upon Discharge  other (see comments)   TBD    Discharge Plan discussed with: Patient     Discharge Barriers Identified None        Relationship/Environment    Name(s) of Who Lives With Patient Kiersten Read               This SW met with patient and her mother (Kiersten Read) bedside to complete DPA. Questions answered / contact numbers provided.  Use PREFERRED PHARMACY / BEDSIDE DELIVERY for any necessary medications at time of discharge. The patient is independent with all ADLs - does not use DME, In-home equipment, BTs or home oxygen. The patient goes to [facility] on Tuesdays, Thursdays, and Saturdays. The patient's mother will be assisting with help upon discharge. The patient's mother will be providing transportation home. Hospital follow up will be scheduled with PCP. Will continue to follow for course of hospitalization.     Benjamín Tian LMSW  Case Management [facility]  Ext: [phone]

## 2022-01-19 NOTE — PROGRESS NOTES
[facility] - Surgical Intensive Care  Critical Care Medicine  Progress Note    Patient Name: Isabela Read  MRN: [id number]  Admission Date: 1/17/2022  Hospital Length of Stay: 1 days  Code Status: Full Code  Attending Provider: Moustapha Pollard MD  Primary Care Provider: Tavo Bhatia MD   Principal Problem: Acute respiratory failure with hypoxia and hypercarbia    Subjective:     HPI:  Isabela Read is a 26 y.o. F with history of ESRD on HD (TTS) 2/2 T1DM, HTN, who presents with SOB. Reports receiving full treatment HD on Saturday, and tolerated well. Worsening SOB over last 2 days. Worse with lying flat and on exertion. Reports similar picture occurring twice before, most recently in September, in which she was hospitalized with SOB 2/2 pulmonary edema between dialysis sessions. Reports making urine. Does not take diuretics. Denies fever, CP, cough, abdominal pain, N/V/D/C, dysuria. Some chills. Per EMS, SpO2 75% on RA. Arrived to ED on CPAP.      Hospital/ICU Course:  No notes on file    Interval History/Significant Events: Dialysis without issue overnight, transitioned off drip to home insulin reg, NAEON    Review of Systems   Constitutional: Negative for chills and fever.   HENT: Negative for congestion and drooling.    Respiratory: Negative for cough and shortness of breath.    Cardiovascular: Negative for chest pain.   Gastrointestinal: Negative for abdominal pain and vomiting.   Genitourinary: Negative for dysuria.   Musculoskeletal: Negative for myalgias.   Skin: Negative for rash and wound.   Neurological: Negative for light-headedness and headaches.     Objective:     Vital Signs (Most Recent):  Temp: 98.8 °F (37.1 °C) (01/19/22 1115)  Pulse: 90 (01/19/22 1200)  Resp: 18 (01/19/22 1200)  BP: (!) 156/62 (01/19/22 1200)  SpO2: (!) 94 % (01/19/22 1200) Vital Signs (24h Range):  Temp:  [98.4 °F (36.9 °C)-98.9 °F (37.2 °C)] 98.8 °F (37.1 °C)  Pulse:  [86-98] 90  Resp:  [5-35] 18  SpO2:  [91  %-100 %] 94 %  BP: (118-158)/(62-92) 156/62   Weight: 64.4 kg (142 lb)  Body mass index is 24.37 kg/m².      Intake/Output Summary (Last 24 hours) at 1/19/2022 1515  Last data filed at 1/19/2022 0915  Gross per 24 hour   Intake 2269.95 ml   Output 2911 ml   Net -641.05 ml       Physical Exam  Vitals and nursing note reviewed.   Constitutional:       Comments: Awake, talkative   HENT:      Head: Normocephalic.      Nose: No rhinorrhea.      Mouth/Throat:      Mouth: Mucous membranes are moist.   Eyes:      General:         Right eye: No discharge.         Left eye: No discharge.   Neck:      Comments: Ranges neck appropriately  Cardiovascular:      Rate and Rhythm: Normal rate and regular rhythm.   Pulmonary:      Effort: Pulmonary effort is normal. No respiratory distress.      Breath sounds: No stridor.   Abdominal:      General: Abdomen is flat. There is no distension.      Palpations: Abdomen is soft.      Tenderness: There is no abdominal tenderness. There is no guarding or rebound.   Musculoskeletal:      Comments: No gross extremity deformity   Skin:     General: Skin is warm and dry.   Neurological:      Mental Status: She is alert and oriented to person, place, and time.      Comments: Cns grossly intact, follows commands         Vents:  Oxygen Concentration (%): 32 (01/19/22 0500)  Lines/Drains/Airways     Central Venous Catheter Line                 Hemodialysis Catheter right subclavian -- days          Drain                 Hemodialysis AV Fistula Left upper arm -- days          Peripheral Intravenous Line                 Peripheral IV - Single Lumen 01/17/22 18 G Right Antecubital 2 days         Peripheral IV - Single Lumen 01/17/22 2342 20 G Right Hand 1 day              Significant Labs:    CBC/Anemia Profile:  Recent Labs   Lab 01/17/22  2339 01/18/22  0605 01/19/22  0324   WBC 18.13* 16.63* 9.36   HGB 7.8* 6.8* 7.1*   HCT 24.9* 21.5* 22.3*    189 205   * 102* 101*   RDW 16.2* 16.4*  16.8*   FOLATE  --   --  4.6   [id number]  --   --  321        Chemistries:  Recent Labs   Lab 01/17/22  2339 01/18/22  0737 01/18/22  1323 01/18/22  1323 01/18/22  1810 01/18/22  2249 01/19/22  0324   *   < > 130*   < > 132* 136  135* 135*   K 5.0   < > 5.0   < > 4.8 4.5  4.5 4.5   CL 95   < > 93*   < > 95 100  99 101   CO2 23   < > 22*   < > 25 27  29 26   BUN 37*   < > 19   < > 18 11  10 7   CREATININE 7.4*   < > 4.9*   < > 4.5* 3.0*  2.9* 2.3*   CALCIUM 8.7   < > 8.4*   < > 8.2* 8.4*  7.8* 8.1*   ALBUMIN 3.2*  --  2.9*  --   --  2.4*  --    PROT 7.6  --  6.1  --   --   --   --    BILITOT 0.5  --  0.8  --   --   --   --    ALKPHOS 108  --  102  --   --   --   --    ALT 16  --  13  --   --   --   --    AST 29  --  18  --   --   --   --    MG 2.4  --  2.1  --   --  2.0 1.9   PHOS 5.0*  --  4.5  --   --  3.6 2.8    < > = values in this interval not displayed.       All pertinent labs within the past 24 hours have been reviewed.    Significant Imaging:  I have reviewed all pertinent imaging results/findings within the past 24 hours.      [facility]  Recent Labs   Lab 01/17/22  2328   PH 7.306*   PO2 55   PCO2 63.3*   HCO3 31.6*   BE 5     Assessment/Plan:     Pulmonary  * Acute respiratory failure with hypoxia and hypercarbia  -- Improved, now on RA  Acute hypoxemic hypercapnic respiratory failure 2/2 pulmonary edema /hypervolemia in the setting of ESRD with associated hypertensive emergency, required bipap and nitro on admission  -- Similar picture occurring twice before, most recently in September, hospitalized with pulmonary edema between dialysis sessions.   -- Improved after two rounds of dialysis    Acute pulmonary edema  See respiratory failure    Cardiac/Vascular  Hypertensive emergency  --resolved  --taking home htn regimen    Mixed hyperlipidemia  -takes statin at home    Renal/  End stage renal failure on dialysis  See respiratory failure    Oncology  Leukocytosis  -- resolved  -- dc'ed abx  -- no  culture growth  -- no infectious signs or symptoms    Endocrine  Hyperglycemia due to type 1 diabetes mellitus  -- Goal 140-180  -- Diabetic diet  -- see DKA      Type 1 diabetes mellitus with end-stage renal disease (ESRD)  See DKA    DKA (diabetic ketoacidosis)  +beta hydroxy with mild gap and mild acidosis on 1/18  - transitioned from insulin drip to subq last night, gap closed  - tolerating PO  - home regimen + SSI    Other  Anemia due to chronic kidney disease, on chronic dialysis  Hgb 7.1 this AM       Critical Care Daily Checklist:    A: Awake: RASS Goal/Actual Goal: RASS Goal: 0-->alert and calm  Actual: Rea Agitation Sedation Scale (RASS): Alert and calm   B: Spontaneous Breathing Trial Performed?     C: SAT & SBT Coordinated?  NA                      D: Delirium: CAM-ICU     E: Early Mobility Performed? Yes   F: Feeding Goal:    Status:     Current Diet Order   Procedures    Diet diabetic [facility]; 2000 Calorie; Renal     Order Specific Question:   Indicate patient location for additional diet options:     Answer:   [facility]     Order Specific Question:   Total calories:     Answer:   2000 Calorie     Order Specific Question:   Additional Diet Options:     Answer:   Renal      AS: Analgesia/Sedation prn   T: Thromboembolic Prophylaxis yes   H: HOB > 300 Yes   U: Stress Ulcer Prophylaxis (if needed) diet   G: Glucose Control Long acting, meal time and ssi   B: Bowel Function Stool Occurrence: 1   I: Indwelling Catheter (Lines & Moncada) Necessity assessed   D: De-escalation of Antimicrobials/Pharmacotherapies dc'ed    Plan for the day/ETD Repeat FSBG of 250, will discharge    Code Status:  Family/Goals of Care: Full Code         Critical secondary to Patient has a condition that poses threat to life and bodily function: AHRF, DKA      Critical care was time spent personally by me on the following activities: development of treatment plan with patient or surrogate and bedside caregivers,  discussions with consultants, evaluation of patient's response to treatment, examination of patient, ordering and performing treatments and interventions, ordering and review of laboratory studies, ordering and review of radiographic studies, pulse oximetry, re-evaluation of patient's condition. This critical care time did not overlap with that of any other provider or involve time for any procedures.     Jeanmarie Espinal MD  Critical Care Medicine  [facility] - Surgical Intensive Care

## 2022-01-19 NOTE — HOSPITAL COURSE
Initially on BiPap and nitro drip, able to wean off after emergent dialysis on the first night. 1/18 AM she was improved but still appeared volume overloaded. Found to be in DKA. Insulin drip started. Nephro consulted for repeat dialysis. In the PM of 1/18 we were able to wean off insulin drip and transition to home insulin regimen. Dialysis overnight with no adverse events. She feels better 1/19 AM, is off all oxygen and comfortable appearing.

## 2022-01-19 NOTE — SUBJECTIVE & OBJECTIVE
Interval History/Significant Events: Dialysis overnight without issue, feeling better    Review of Systems  Objective:     Vital Signs (Most Recent):  Temp: 98.8 °F (37.1 °C) (01/19/22 1115)  Pulse: 90 (01/19/22 1200)  Resp: 18 (01/19/22 1200)  BP: (!) 156/62 (01/19/22 1200)  SpO2: (!) 94 % (01/19/22 1200) Vital Signs (24h Range):  Temp:  [98.4 °F (36.9 °C)-98.9 °F (37.2 °C)] 98.8 °F (37.1 °C)  Pulse:  [86-98] 90  Resp:  [6-35] 18  SpO2:  [91 %-100 %] 94 %  BP: (118-158)/(62-92) 156/62   Weight: 64.4 kg (142 lb)  Body mass index is 24.37 kg/m².      Intake/Output Summary (Last 24 hours) at 1/19/2022 1549  Last data filed at 1/19/2022 0915  Gross per 24 hour   Intake 2269.95 ml   Output 2911 ml   Net -641.05 ml       Physical Exam    Vents:  Oxygen Concentration (%): 32 (01/19/22 0500)  Lines/Drains/Airways     Central Venous Catheter Line                 Hemodialysis Catheter right subclavian -- days          Drain                 Hemodialysis AV Fistula Left upper arm -- days          Peripheral Intravenous Line                 Peripheral IV - Single Lumen 01/17/22 18 G Right Antecubital 2 days         Peripheral IV - Single Lumen 01/17/22 2342 20 G Right Hand 1 day              Significant Labs:    CBC/Anemia Profile:  Recent Labs   Lab 01/17/22  2339 01/18/22  0605 01/19/22  0324   WBC 18.13* 16.63* 9.36   HGB 7.8* 6.8* 7.1*   HCT 24.9* 21.5* 22.3*    189 205   * 102* 101*   RDW 16.2* 16.4* 16.8*   FOLATE  --   --  4.6   [id number]  --   --  321        Chemistries:  Recent Labs   Lab 01/17/22  2339 01/18/22  0737 01/18/22  1323 01/18/22  1810 01/18/22  2249 01/19/22  0324 01/19/22  1446   *   < > 130*   < > 136  135* 135* 136   K 5.0   < > 5.0   < > 4.5  4.5 4.5 4.2   CL 95   < > 93*   < > 100  99 101 100   CO2 23   < > 22*   < > 27  29 26 27   BUN 37*   < > 19   < > 11  10 7 14   CREATININE 7.4*   < > 4.9*   < > 3.0*  2.9* 2.3* 2.8*   CALCIUM 8.7   < > 8.4*   < > 8.4*  7.8* 8.1* 8.3*  "  ALBUMIN 3.2*  --  2.9*  --  2.4*  --  2.4*   PROT 7.6  --  6.1  --   --   --   --    BILITOT 0.5  --  0.8  --   --   --   --    ALKPHOS 108  --  102  --   --   --   --    ALT 16  --  13  --   --   --   --    AST 29  --  18  --   --   --   --    MG 2.4  --  2.1   < > 2.0 1.9 2.6   PHOS 5.0*  --  4.5   < > 3.6 2.8 3.0    < > = values in this interval not displayed.       {Results:08004}    Significant Imaging:  {Imaging Review:15460::"I have reviewed all pertinent imaging results/findings within the past 24 hours."}  "

## 2022-01-19 NOTE — PLAN OF CARE
SICU PLAN OF CARE NOTE    Dx: Acute respiratory failure with hypoxia and hypercarbia    Shift Events: No acute events overnight. OOBTC.    Goals of Care: Wean O2.     Neuro: AAO x4, Follows Commands, and Moves All Extremities    Cardiac: NSR. HR 80-90's. -140's. All pulses palpable.    Vital Signs: /76 (BP Location: Right arm, Patient Position: Sitting)   Pulse 88   Temp 98.8 °F (37.1 °C) (Oral)   Resp (!) 7   Wt 64.4 kg (142 lb)   SpO2 100%   BMI 24.37 kg/m²     Respiratory: Nasal Cannula 2L    Diet: Diabetic Diet    Urine Output: Anuric     CRRT: 8h SLED tx overnight. ~1.3L removed.    Labs/Accuchecks: Daily labs. BMP Q4. Accuchecks ACHS.    Skin: No new skin breakdown noted. Pt repositions self independently. Bed plugged in and working, set for pt weight.

## 2022-01-19 NOTE — ASSESSMENT & PLAN NOTE
+beta hydroxy with mild gap and mild acidosis on 1/18  - transitioned from insulin drip to subq last night, gap closed  - tolerating PO  - home regimen + SSI

## 2022-01-19 NOTE — PROGRESS NOTES
[facility] - [facility] Intensive Care  Endocrinology  Progress Note    Admit Date: 2022     Reason for Consult: Management of T1DM, Hyperglycemia     Surgical Procedure and Date: n/a    Diabetes diagnosis year: at 8 years of age     Home Diabetes Medications:  Levemir 10 units in the am and 12 units q HS, Humalog 10 units with meals    How often checking glucose at home?  Freestyle Jayda    BG readings on regimen: variable per patient (100-300s)   Hypoglycemia on the regimen?  Yes, 1-2x weekly  Missed doses on regimen?  No     Diabetes Complications include:     Hyperglycemia, Hypoglycemia , gastroparesis, Diabetic nephropathy, and Diabetic chronic kidney disease          Complicating diabetes co morbidities:   HTN, ESRD on dialysis         HPI:   Patient is a 26 y.o. female with a diagnosis of T1DM, ESRD on HD, likely restrictive lung disease, HLD, BALDOMERO, h/o AVF occlusion who presented to the ED  for shortness of breath, CXR upon presentation showed pulmonary edema. She will be admitted for further workup and management. Endocrinology consulted for management of T1DM.                  Lab Results   Component Value Date     HGBA1C 10.2 (H) 2021               Interval HPI:   Overnight events: Remains in SICU. IV insulin infusion stopped yesterday evening and SQ insulin regimen initiated. BG reasonably well controlled overnight. Creatinine 2.3. Diet diabetic [facility]; 2000 Calorie; Renal  Eatin%  Nausea: No  Hypoglycemia and intervention: No  Fever: No  TPN and/or TF: No  If yes, type of TF/TPN and rate: n/a    BP (!) 153/79   Pulse 93   Temp 98.9 °F (37.2 °C) (Oral)   Resp 18   Wt 64.4 kg (142 lb)   SpO2 96%   BMI 24.37 kg/m²     Labs Reviewed and Include    Recent Labs   Lab 22  1323 22  1810 22  2249 22  2249 22  0324   *   < > 149*  150*   < > 174*   CALCIUM 8.4*   < > 8.4*  7.8*   < > 8.1*   ALBUMIN 2.9*   < > 2.4*  --   --    PROT 6.1  --    --   --   --    *   < > 136  135*   < > 135*   K 5.0   < > 4.5  4.5   < > 4.5   CO2 22*   < > 27  29   < > 26   CL 93*   < > 100  99   < > 101   BUN 19   < > 11  10   < > 7   CREATININE 4.9*   < > 3.0*  2.9*   < > 2.3*   ALKPHOS 102  --   --   --   --    ALT 13  --   --   --   --    AST 18  --   --   --   --    BILITOT 0.8  --   --   --   --     < > = values in this interval not displayed.     Lab Results   Component Value Date    WBC 9.36 01/19/2022    HGB 7.1 (L) 01/19/2022    HCT 22.3 (L) 01/19/2022     (H) 01/19/2022     01/19/2022     No results for input(s): TSH, FREET4 in the last 168 hours.  Lab Results   Component Value Date    HGBA1C 10.9 (H) 10/23/2021       Nutritional status:   Body mass index is 24.37 kg/m².  Lab Results   Component Value Date    ALBUMIN 2.4 (L) 01/18/2022    ALBUMIN 2.9 (L) 01/18/2022    ALBUMIN 3.2 (L) 01/17/2022     No results found for: PREALBUMIN    Estimated Creatinine Clearance: 32 mL/min (A) (based on SCr of 2.3 mg/dL (H)).    Accu-Checks  Recent Labs     01/18/22  1404 01/18/22  1509 01/18/22  1623 01/18/22  1706 01/18/22  1810 01/18/22  1917 01/18/22 2000 01/18/22  2058 01/18/22  2249 01/19/22  0323   POCTGLUCOSE 452* 344* 316* 253* 222* 186* 172* 143* 176* 194*       Current Medications and/or Treatments Impacting Glycemic Control  Immunotherapy:    Immunosuppressants     None        Steroids:   Hormones (From admission, onward)            Start     Stop Route Frequency Ordered    01/18/22 0253  melatonin tablet 6 mg         -- Oral Nightly PRN 01/18/22 0155        Pressors:    Autonomic Drugs (From admission, onward)            None        Hyperglycemia/Diabetes Medications:   Antihyperglycemics (From admission, onward)            Start     Stop Route Frequency Ordered    01/19/22 2100  insulin detemir U-100 pen 12 Units         -- SubQ Nightly 01/18/22 2124 01/19/22 0900  insulin detemir U-100 pen 10 Units         -- SubQ Daily 01/18/22 2124     01/19/22 0715  insulin aspart U-100 pen 10 Units         -- SubQ 3 times daily with meals 01/18/22 2124 01/18/22 2222  insulin aspart U-100 pen 0-5 Units         -- SubQ Before meals & nightly PRN 01/18/22 2124          ASSESSMENT and PLAN    * Acute respiratory failure with hypoxia and hypercarbia  Managed per primary team  Optimize BG control      Type 1 diabetes mellitus with end-stage renal disease (ESRD)  BG goal 140-180  Pt did not receive scheduled Novolog 10 units with breakfast; likely cause of prandial BG excursions this morning.     Plan:  -Continue Levemir 12 units q HS and 10 units once daily  -Continue Novolog 10 units TID with meals (home dose)  -Low Dose Correction Scale  -BG monitoring ac/hs    ** Please notify Endocrine for any change and/or advance in diet**    Discharge plans: TBD    End stage renal failure on dialysis  Lab Results   Component Value Date    CREATININE 2.3 (H) 01/19/2022     Avoid insulin stacking  Titrate insulin slowly      Mixed hyperlipidemia  May increase insulin resistance.             Holli Good NP  Endocrinology  [facility] - Surgical Intensive Care

## 2022-01-19 NOTE — SUBJECTIVE & OBJECTIVE
Interval HPI:   Overnight events: Remains in SICU. IV insulin infusion stopped yesterday evening and SQ insulin regimen initiated. BG reasonably well controlled overnight. Creatinine 2.3. Diet diabetic [facility];  Calorie; Renal  Eatin%  Nausea: No  Hypoglycemia and intervention: No  Fever: No  TPN and/or TF: No  If yes, type of TF/TPN and rate: n/a    BP (!) 153/79   Pulse 93   Temp 98.9 °F (37.2 °C) (Oral)   Resp 18   Wt 64.4 kg (142 lb)   SpO2 96%   BMI 24.37 kg/m²     Labs Reviewed and Include    Recent Labs   Lab 22  1323 22  1810 22  2249 22  0324   *   < > 149*  150*   < > 174*   CALCIUM 8.4*   < > 8.4*  7.8*   < > 8.1*   ALBUMIN 2.9*   < > 2.4*  --   --    PROT 6.1  --   --   --   --    *   < > 136  135*   < > 135*   K 5.0   < > 4.5  4.5   < > 4.5   CO2 22*   < > 27  29   < > 26   CL 93*   < > 100  99   < > 101   BUN 19   < > 11  10   < > 7   CREATININE 4.9*   < > 3.0*  2.9*   < > 2.3*   ALKPHOS 102  --   --   --   --    ALT 13  --   --   --   --    AST 18  --   --   --   --    BILITOT 0.8  --   --   --   --     < > = values in this interval not displayed.     Lab Results   Component Value Date    WBC 9.36 2022    HGB 7.1 (L) 2022    HCT 22.3 (L) 2022     (H) 2022     2022     No results for input(s): TSH, FREET4 in the last 168 hours.  Lab Results   Component Value Date    HGBA1C 10.9 (H) 10/23/2021       Nutritional status:   Body mass index is 24.37 kg/m².  Lab Results   Component Value Date    ALBUMIN 2.4 (L) 2022    ALBUMIN 2.9 (L) 2022    ALBUMIN 3.2 (L) 2022     No results found for: PREALBUMIN    Estimated Creatinine Clearance: 32 mL/min (A) (based on SCr of 2.3 mg/dL (H)).    Accu-Checks  Recent Labs     22  1404 22  1509 22  1623 22  1706 22  1810 22  1917 22  2058 22  2249 22  0323    POCTGLUCOSE 452* 344* 316* 253* 222* 186* 172* 143* 176* 194*       Current Medications and/or Treatments Impacting Glycemic Control  Immunotherapy:    Immunosuppressants     None        Steroids:   Hormones (From admission, onward)            Start     Stop Route Frequency Ordered    01/18/22 0253  melatonin tablet 6 mg         -- Oral Nightly PRN 01/18/22 0155        Pressors:    Autonomic Drugs (From admission, onward)            None        Hyperglycemia/Diabetes Medications:   Antihyperglycemics (From admission, onward)            Start     Stop Route Frequency Ordered    01/19/22 2100  insulin detemir U-100 pen 12 Units         -- SubQ Nightly 01/18/22 2124 01/19/22 0900  insulin detemir U-100 pen 10 Units         -- SubQ Daily 01/18/22 2124 01/19/22 0715  insulin aspart U-100 pen 10 Units         -- SubQ 3 times daily with meals 01/18/22 2124 01/18/22 2222  insulin aspart U-100 pen 0-5 Units         -- SubQ Before meals & nightly PRN 01/18/22 2124

## 2022-01-19 NOTE — PLAN OF CARE
SICU PLAN OF CARE NOTE    Dx: Acute respiratory failure with hypoxia and hypercarbia    Goals of Care:  MAP >65; Rei O2; Blood glucose control    Vital Signs:  /82   Pulse 89   Temp 98.8 °F (37.1 °C) (Oral)   Resp 12   Wt 64.4 kg (142 lb)   SpO2 (!) 92%   BMI 24.37 kg/m²     Cardiac:  NSR 80s-90s    Resp:  SpO2 % on Room Air    Neuro:  AAO x4, Follows Commands and Moves All Extremities    Gtts: SL    Urine Output:  Anuric 0 cc/shift    Diet:  2000 Hilario Diabetic Diet/Renal Diet      Labs/Accuchecks:  AC/HS Accuchecks. Labs monitored. Sliding scale provided     Skin:  All skin remains free from injury.  Patient turned Q2, waffle mattress inflated, ICU bed working correctly.    Shift Events:  Multiple BM this shift. Blood glucose monitored and covered per scheduled dose and sliding scale insulin. Ambulates with standby assist only. Mother at bedside assisting with patient care. Patient educated on diabetes management at hospital and at home. Patient voiced understanding of all teachings. Reports no nausea this shift.  See flowsheet for further assessment/details.  Family (mother) updated on current condition/plan of care, questions answered, and emotional support provided.   MD updated on current condition, vitals, labs, and gtts.  No new orders received, will continue to monitor.

## 2022-01-20 ENCOUNTER — PATIENT MESSAGE (OUTPATIENT)
Dept: ADMINISTRATIVE | Facility: CLINIC | Age: 27
End: 2022-01-20
Payer: MEDICARE

## 2022-01-20 ENCOUNTER — PATIENT OUTREACH (OUTPATIENT)
Dept: ADMINISTRATIVE | Facility: CLINIC | Age: 27
End: 2022-01-20
Payer: MEDICARE

## 2022-01-20 NOTE — PLAN OF CARE
01/20/22 1118   Final Note   Assessment Type Final Discharge Note   Anticipated Discharge Disposition Home   Post-Acute Status   Post-Acute Authorization Other   Other Status No Post-Acute Service Needs     The patient has been discharged with no social service needs identified at this time.       Benjamín Tian LMSW  Case Management [facility]  Ext: [phone]

## 2022-01-20 NOTE — PLAN OF CARE
11:09 AM    The SW contacted the patient's pcp at [phone] to schedule a hospital follow-up visit. The patient has been successfully scheduled with a appointment with Dr. Adam on January 28,2022 at 12:30 pm. The SW proceeded to contact Dr. Mayo office at [phone]  to schedule a follow-up hospital visit. The SW was told the Dr. Mayo earliest appointment was in May. The representative informed the SW that she was going to send Dr. Mayo's nurse a message regarding the patient needing a hospital follow-up visit. The representative stated that she was going to have the nurse contact the SW and patient regarding a aftercare appointment. The SW contacted the patient at [phone] to discuss her discharge appointments. ALL Questions answered regarding the patient's aftercare appointments.       Benjamín Tian LMSW  Case Management [facility]  Ext: [phone]

## 2022-01-20 NOTE — DISCHARGE SUMMARY
[facility]  Discharge Note  Short Stay    * No surgery found *    OUTCOME: Condition has improved and patient is now ready for discharge.    Hospital Course: Presented SOB, found to be volume overloaded with HTN emergency and pulmonary edema. Initially on BiPap and nitro drip, able to wean off after emergent dialysis on the first night. 1/18 AM she was improved but still appeared volume overloaded. Found to be in DKA. Insulin drip started. Nephro consulted for repeat dialysis. In the PM of 1/18 we were able to wean off insulin drip and transition to home insulin regimen. Dialysis overnight with no adverse events. Improved 1/19 AM, off all oxygen and comfortable appearing. Discharged home on home regimen with instructions to follow up with endocrine and PCP. Reached out to endocrine physician. Also instructed on fluid restriction and talking to her nephrologist about when she needs more fluid off. All questions answered.    DISPOSITION: Home or Self Care    FINAL DIAGNOSIS:  Acute respiratory failure with hypoxia and hypercarbia    FOLLOWUP: With primary care provider    DISCHARGE INSTRUCTIONS:    Discharge Procedure Orders   Ambulatory referral/consult to Outpatient Case Management   Referral Priority: Routine Referral Type: Consultation   Referral Reason: Specialty Services Required   Number of Visits Requested: 1         Clinical Reference Documents Added to Patient Instructions       Document    DIABETIC KETOACIDOSIS (ENGLISH)    HEMODIALYSIS DISCHARGE INSTRUCTIONS (ENGLISH)    USING INSULIN (ENGLISH)          TIME SPENT ON DISCHARGE: 30 minutes

## 2022-01-20 NOTE — PROGRESS NOTES
C3 nurse spoke with Isabela Read for a TCC post hospital discharge follow up call. The patient has a scheduled [facility] appointment with GOYO Guillaume on 1/28/2022 @ 1230.

## 2022-01-20 NOTE — PATIENT INSTRUCTIONS
Patient Education       Respiratory Distress Syndrome Discharge Instructions, Adult   About this topic   ARDS stands for acute respiratory distress syndrome. It is a very serious lung problem that may lead to death. This illness keeps you from getting enough oxygen into your blood because your lungs are filled with fluid. Then, you are not able to get enough oxygen to other parts of your body. People may get ARDS if they are very sick. They may have other serious illnesses or have very bad injuries. ARDS must be treated right away. This may help stop more damage to the body.     What care is needed at home?   · Ask your doctor what you need to do when you go home. Make sure you ask questions if you do not understand what the doctor says. This way you will know what you need to do.  · You may need to have oxygen therapy at home. You may have been shown breathing exercise while in the hospital. Keep doing them when you get home.  · Make your family and friends aware of your condition. Let them know how they can help you.  · Do not smoke and do not drink beer, wine, and mixed drinks (alcohol).  · Keep doing your breathing exercises.  · Do not go outside in very cold or very hot weather. Do not go outside when the air quality is bad.  What follow-up care is needed?   Your doctor may ask you to make visits to the office to check on your progress. Be sure to keep these visits. You may need to go to a lung rehab center for more care. This may help you get your strength back. Talk with your doctor about any concerns or worries you may have.  What drugs may be needed?   The doctor may order drugs to:  · Fight an infection  · Help with swelling  · Get rid of extra fluid from the lungs  · Prevent blood clots  Will physical activity be limited?   You may have to limit your activity. Talk to your doctor about the right amount of activity for you. Doing exercises to help your lungs get stronger will be important.  What problems  could happen?   · Short-term or long-term lung damage  · Infection  · Weakening of other organs  · Problems with brain functions like decreased memory or concentration  · Muscle wasting and weakness  · Collapsed lung. This is a pneumothorax.  · Emotional problems, such as depression, anxiety, and post-traumatic stress disorder  What can be done to prevent this health problem?   ARDS most often happens to people who are already in the hospital for illness or trauma. Keep your lungs healthy so if it happens, you have a better chance of recovery. Here are some things you may do to prevent illnesses.  · Wash your hands often with soap and water for at least 20 seconds, especially after coughing or sneezing. Alcohol-based hand sanitizers also work to kill the virus.  · If you are sick, cover your mouth and nose with tissue when you cough or sneeze. You can also cough into your elbow. Throw away tissues in the trash and wash your hands after touching used tissues.  · Do not get too close (kissing, hugging) to people who are sick.  · Do not share towels or hankies with anyone who is sick.  · Stay away from crowded places.  · Get a flu shot each year.  · Get a pneumonia shot if you havent already, and ask your doctor how often this needs to be repeated.  When do I need to call the doctor?   · Signs of infection. These include a fever of 100.4°F (38°C) or higher, chills, very bad sore throat, cough, more sputum or change in color of sputum.  · Harder time breathing or if you get dizzy or lightheaded  · New pain or swelling in your legs  Teach Back: Helping You Understand   The Teach Back Method helps you understand the information we are giving you. After you talk with the staff, tell them in your own words what you learned. This helps to make sure the staff has described each thing clearly. It also helps to explain things that may have been confusing. Before going home, make sure you can do these:  · I can tell you about  my condition.  · I can tell you what may help ease my breathing.  · I can tell you what I will do if I have trouble breathing or get dizzy.  Where can I learn more?   National Organization of Rare Disorders  http://www.rarediseases.org/rare-disease-information/rare-diseases/byID/611/viewAbstract   NHS Choices  https://www.nhs.uk/conditions/acute-respiratory-distress-syndrome/   Last Reviewed Date   2020-08-24  Consumer Information Use and Disclaimer   This information is not specific medical advice and does not replace information you receive from your health care provider. This is only a brief summary of general information. It does NOT include all information about conditions, illnesses, injuries, tests, procedures, treatments, therapies, discharge instructions or life-style choices that may apply to you. You must talk with your health care provider for complete information about your health and treatment options. This information should not be used to decide whether or not to accept your health care providers advice, instructions or recommendations. Only your health care provider has the knowledge and training to provide advice that is right for you.  Copyright   Copyright © 2022 [employer] and its affiliates and/or licensors. All rights reserved.  Josefa teaching reviewed with Isabela Read . She verbalized understanding.    Education was provided based on the patient's discharge diagnosis using the attached Josefa patient education as a reference.

## 2022-01-20 NOTE — ASSESSMENT & PLAN NOTE
-- Goal 140-180  -- Diabetic diet  -- continue home insulin regimen, follow up with endocrinologist  -- see DKA

## 2022-01-20 NOTE — PROGRESS NOTES
C3 nurse attempted to contact Isabela Read for a TCC post hospital discharge follow up call. The patient is unable to conduct the call @ this time. The patient requested a callback.  Message sent via [facility] portal for Post Discharge Attempt.    The patient has a scheduled [facility] appointment with GOYO Gutierrez on 1/28/2022 @ 1230. Message sent to Provider staff.

## 2022-01-21 DIAGNOSIS — Z01.818 PRE-OP TESTING: Primary | ICD-10-CM

## 2022-01-22 ENCOUNTER — LAB VISIT (OUTPATIENT)
Dept: PRIMARY CARE CLINIC | Facility: CLINIC | Age: 27
End: 2022-01-22
Payer: MEDICARE

## 2022-01-22 DIAGNOSIS — Z01.818 PRE-OP TESTING: ICD-10-CM

## 2022-01-22 PROCEDURE — U0005 INFEC AGEN DETEC AMPLI PROBE: HCPCS | Mod: NTX | Performed by: OPHTHALMOLOGY

## 2022-01-22 PROCEDURE — U0003 INFECTIOUS AGENT DETECTION BY NUCLEIC ACID (DNA OR RNA); SEVERE ACUTE RESPIRATORY SYNDROME CORONAVIRUS 2 (SARS-COV-2) (CORONAVIRUS DISEASE [COVID-19]), AMPLIFIED PROBE TECHNIQUE, MAKING USE OF HIGH THROUGHPUT TECHNOLOGIES AS DESCRIBED BY CMS-2020-01-R: HCPCS | Mod: NTX | Performed by: OPHTHALMOLOGY

## 2022-01-23 LAB
BACTERIA BLD CULT: NORMAL
BACTERIA BLD CULT: NORMAL

## 2022-01-24 ENCOUNTER — TELEPHONE (OUTPATIENT)
Dept: OPHTHALMOLOGY | Facility: CLINIC | Age: 27
End: 2022-01-24
Payer: MEDICARE

## 2022-01-24 LAB
SARS-COV-2 RNA RESP QL NAA+PROBE: NOT DETECTED
SARS-COV-2- CYCLE NUMBER: NORMAL

## 2022-01-25 ENCOUNTER — ANESTHESIA EVENT (OUTPATIENT)
Dept: SURGERY | Facility: HOSPITAL | Age: 27
End: 2022-01-25
Payer: MEDICARE

## 2022-01-25 ENCOUNTER — ANESTHESIA (OUTPATIENT)
Dept: SURGERY | Facility: HOSPITAL | Age: 27
End: 2022-01-25
Payer: MEDICARE

## 2022-01-25 ENCOUNTER — HOSPITAL ENCOUNTER (OUTPATIENT)
Facility: HOSPITAL | Age: 27
Discharge: HOME | End: 2022-01-25
Attending: OPHTHALMOLOGY | Admitting: OPHTHALMOLOGY
Payer: MEDICARE

## 2022-01-25 ENCOUNTER — ANESTHESIA EVENT (OUTPATIENT)
Dept: ENDOSCOPY | Facility: HOSPITAL | Age: 27
End: 2022-01-25
Payer: MEDICARE

## 2022-01-25 VITALS
HEART RATE: 90 BPM | OXYGEN SATURATION: 100 % | SYSTOLIC BLOOD PRESSURE: 208 MMHG | BODY MASS INDEX: 23.31 KG/M2 | RESPIRATION RATE: 15 BRPM | TEMPERATURE: 99 F | DIASTOLIC BLOOD PRESSURE: 116 MMHG | WEIGHT: 135.81 LBS

## 2022-01-25 DIAGNOSIS — E10.3521 TYPE 1 DIABETES MELLITUS WITH RIGHT EYE AFFECTED BY PROLIFERATIVE RETINOPATHY AND TRACTION RETINAL DETACHMENT INVOLVING MACULA: Primary | ICD-10-CM

## 2022-01-25 DIAGNOSIS — H33.41 TRACTION RETINAL DETACHMENT, RIGHT: ICD-10-CM

## 2022-01-25 DIAGNOSIS — H33.41 TRACTION DETACHMENT OF RIGHT RETINA: ICD-10-CM

## 2022-01-25 LAB
POCT GLUCOSE: 69 MG/DL (ref 70–110)
POCT GLUCOSE: 72 MG/DL (ref 70–110)
POCT GLUCOSE: 86 MG/DL (ref 70–110)

## 2022-01-25 PROCEDURE — 63600175 PHARM REV CODE 636 W HCPCS: Mod: NTX | Performed by: NURSE ANESTHETIST, CERTIFIED REGISTERED

## 2022-01-25 PROCEDURE — 25000003 PHARM REV CODE 250: Mod: TXP | Performed by: STUDENT IN AN ORGANIZED HEALTH CARE EDUCATION/TRAINING PROGRAM

## 2022-01-25 PROCEDURE — 71000044 HC DOSC ROUTINE RECOVERY FIRST HOUR: Mod: TXP | Performed by: OPHTHALMOLOGY

## 2022-01-25 PROCEDURE — 37000009 HC ANESTHESIA EA ADD 15 MINS: Mod: TXP | Performed by: OPHTHALMOLOGY

## 2022-01-25 PROCEDURE — 63600175 PHARM REV CODE 636 W HCPCS: Mod: TXP | Performed by: STUDENT IN AN ORGANIZED HEALTH CARE EDUCATION/TRAINING PROGRAM

## 2022-01-25 PROCEDURE — 99499 UNLISTED E&M SERVICE: CPT | Mod: NTX,,, | Performed by: STUDENT IN AN ORGANIZED HEALTH CARE EDUCATION/TRAINING PROGRAM

## 2022-01-25 PROCEDURE — 25000003 PHARM REV CODE 250: Mod: NTX | Performed by: ANESTHESIOLOGY

## 2022-01-25 PROCEDURE — 82962 GLUCOSE BLOOD TEST: CPT | Mod: TXP | Performed by: OPHTHALMOLOGY

## 2022-01-25 PROCEDURE — 25000003 PHARM REV CODE 250: Mod: TXP | Performed by: ANESTHESIOLOGY

## 2022-01-25 PROCEDURE — D9220A PRA ANESTHESIA: ICD-10-PCS | Mod: CRNA,NTX,, | Performed by: STUDENT IN AN ORGANIZED HEALTH CARE EDUCATION/TRAINING PROGRAM

## 2022-01-25 PROCEDURE — 25000003 PHARM REV CODE 250: Mod: TXP | Performed by: OPHTHALMOLOGY

## 2022-01-25 PROCEDURE — 36000706: Mod: NTX | Performed by: OPHTHALMOLOGY

## 2022-01-25 PROCEDURE — 25000003 PHARM REV CODE 250: Mod: NTX | Performed by: STUDENT IN AN ORGANIZED HEALTH CARE EDUCATION/TRAINING PROGRAM

## 2022-01-25 PROCEDURE — 25000003 PHARM REV CODE 250: Mod: NTX | Performed by: NURSE ANESTHETIST, CERTIFIED REGISTERED

## 2022-01-25 PROCEDURE — 67113 PR REPAIR COMPLEX RETINA DETACH VITRECTOMY & MEMB PEEL: ICD-10-PCS | Mod: RT,NTX,, | Performed by: OPHTHALMOLOGY

## 2022-01-25 PROCEDURE — 27201423 OPTIME MED/SURG SUP & DEVICES STERILE SUPPLY: Mod: NTX | Performed by: OPHTHALMOLOGY

## 2022-01-25 PROCEDURE — 71000045 HC DOSC ROUTINE RECOVERY EA ADD'L HR: Mod: NTX | Performed by: OPHTHALMOLOGY

## 2022-01-25 PROCEDURE — 82962 GLUCOSE BLOOD TEST: CPT | Mod: NTX | Performed by: OPHTHALMOLOGY

## 2022-01-25 PROCEDURE — D9220A PRA ANESTHESIA: Mod: CRNA,NTX,, | Performed by: STUDENT IN AN ORGANIZED HEALTH CARE EDUCATION/TRAINING PROGRAM

## 2022-01-25 PROCEDURE — D9220A PRA ANESTHESIA: ICD-10-PCS | Mod: ANES,NTX,, | Performed by: ANESTHESIOLOGY

## 2022-01-25 PROCEDURE — 37000008 HC ANESTHESIA 1ST 15 MINUTES: Mod: NTX | Performed by: OPHTHALMOLOGY

## 2022-01-25 PROCEDURE — 63600175 PHARM REV CODE 636 W HCPCS: Mod: NTX | Performed by: OPHTHALMOLOGY

## 2022-01-25 PROCEDURE — C1784 OCULAR DEV, INTRAOP, DET RET: HCPCS | Mod: NTX | Performed by: OPHTHALMOLOGY

## 2022-01-25 PROCEDURE — 71000015 HC POSTOP RECOV 1ST HR: Mod: TXP | Performed by: OPHTHALMOLOGY

## 2022-01-25 PROCEDURE — 67113 REPAIR RETINAL DETACH CPLX: CPT | Mod: RT,NTX,, | Performed by: OPHTHALMOLOGY

## 2022-01-25 PROCEDURE — 99499 NO LOS: ICD-10-PCS | Mod: NTX,,, | Performed by: STUDENT IN AN ORGANIZED HEALTH CARE EDUCATION/TRAINING PROGRAM

## 2022-01-25 PROCEDURE — C1814 RETINAL TAMP, SILICONE OIL: HCPCS | Mod: TXP | Performed by: OPHTHALMOLOGY

## 2022-01-25 PROCEDURE — D9220A PRA ANESTHESIA: Mod: ANES,NTX,, | Performed by: ANESTHESIOLOGY

## 2022-01-25 PROCEDURE — 36000707: Mod: TXP | Performed by: OPHTHALMOLOGY

## 2022-01-25 RX ORDER — CYCLOPENTOLATE HYDROCHLORIDE 10 MG/ML
1 SOLUTION/ DROPS OPHTHALMIC
Status: DISCONTINUED | OUTPATIENT
Start: 2022-01-25 | End: 2022-01-25 | Stop reason: HOSPADM

## 2022-01-25 RX ORDER — ATROPINE SULFATE 10 MG/ML
1 SOLUTION/ DROPS OPHTHALMIC
Status: DISCONTINUED | OUTPATIENT
Start: 2022-01-25 | End: 2022-01-25 | Stop reason: HOSPADM

## 2022-01-25 RX ORDER — DEXAMETHASONE SODIUM PHOSPHATE 4 MG/ML
INJECTION, SOLUTION INTRA-ARTICULAR; INTRALESIONAL; INTRAMUSCULAR; INTRAVENOUS; SOFT TISSUE
Status: DISCONTINUED | OUTPATIENT
Start: 2022-01-25 | End: 2022-01-25

## 2022-01-25 RX ORDER — HYDROCODONE BITARTRATE AND ACETAMINOPHEN 5; 325 MG/1; MG/1
1 TABLET ORAL EVERY 4 HOURS PRN
Status: DISCONTINUED | OUTPATIENT
Start: 2022-01-25 | End: 2022-01-25 | Stop reason: HOSPADM

## 2022-01-25 RX ORDER — SODIUM CHLORIDE 0.9 % (FLUSH) 0.9 %
10 SYRINGE (ML) INJECTION
Status: DISCONTINUED | OUTPATIENT
Start: 2022-01-25 | End: 2022-01-25 | Stop reason: HOSPADM

## 2022-01-25 RX ORDER — LABETALOL HYDROCHLORIDE 5 MG/ML
10 INJECTION, SOLUTION INTRAVENOUS ONCE
Status: COMPLETED | OUTPATIENT
Start: 2022-01-25 | End: 2022-01-25

## 2022-01-25 RX ORDER — FENTANYL CITRATE 50 UG/ML
25 INJECTION, SOLUTION INTRAMUSCULAR; INTRAVENOUS EVERY 5 MIN PRN
Status: DISCONTINUED | OUTPATIENT
Start: 2022-01-25 | End: 2022-01-25 | Stop reason: HOSPADM

## 2022-01-25 RX ORDER — TROPICAMIDE 10 MG/ML
1 SOLUTION/ DROPS OPHTHALMIC
Status: DISCONTINUED | OUTPATIENT
Start: 2022-01-25 | End: 2022-01-25 | Stop reason: HOSPADM

## 2022-01-25 RX ORDER — FENTANYL CITRATE 50 UG/ML
INJECTION, SOLUTION INTRAMUSCULAR; INTRAVENOUS
Status: DISCONTINUED | OUTPATIENT
Start: 2022-01-25 | End: 2022-01-25

## 2022-01-25 RX ORDER — VANCOMYCIN HYDROCHLORIDE 500 MG/10ML
INJECTION, POWDER, LYOPHILIZED, FOR SOLUTION INTRAVENOUS
Status: DISCONTINUED
Start: 2022-01-25 | End: 2022-01-25 | Stop reason: HOSPADM

## 2022-01-25 RX ORDER — ACETAMINOPHEN 325 MG/1
325 TABLET ORAL EVERY 6 HOURS PRN
Refills: 0 | Status: ON HOLD
Start: 2022-01-25 | End: 2022-05-09

## 2022-01-25 RX ORDER — TETRACAINE HYDROCHLORIDE 5 MG/ML
1 SOLUTION OPHTHALMIC
Status: DISCONTINUED | OUTPATIENT
Start: 2022-01-25 | End: 2022-01-25 | Stop reason: HOSPADM

## 2022-01-25 RX ORDER — DEXAMETHASONE SODIUM PHOSPHATE 4 MG/ML
INJECTION, SOLUTION INTRA-ARTICULAR; INTRALESIONAL; INTRAMUSCULAR; INTRAVENOUS; SOFT TISSUE
Status: DISCONTINUED
Start: 2022-01-25 | End: 2022-01-25 | Stop reason: HOSPADM

## 2022-01-25 RX ORDER — PROPOFOL 10 MG/ML
VIAL (ML) INTRAVENOUS
Status: DISCONTINUED | OUTPATIENT
Start: 2022-01-25 | End: 2022-01-25

## 2022-01-25 RX ORDER — MIDAZOLAM HYDROCHLORIDE 1 MG/ML
INJECTION INTRAMUSCULAR; INTRAVENOUS
Status: DISCONTINUED | OUTPATIENT
Start: 2022-01-25 | End: 2022-01-25

## 2022-01-25 RX ORDER — DEXTROSE 50 % IN WATER (D50W) INTRAVENOUS SYRINGE
CONTINUOUS PRN
Status: DISCONTINUED | OUTPATIENT
Start: 2022-01-25 | End: 2022-01-25

## 2022-01-25 RX ORDER — SODIUM CHLORIDE 9 MG/ML
INJECTION, SOLUTION INTRAVENOUS CONTINUOUS PRN
Status: DISCONTINUED | OUTPATIENT
Start: 2022-01-25 | End: 2022-01-25

## 2022-01-25 RX ORDER — CISATRACURIUM BESYLATE 10 MG/ML
INJECTION, SOLUTION INTRAVENOUS
Status: DISCONTINUED | OUTPATIENT
Start: 2022-01-25 | End: 2022-01-25

## 2022-01-25 RX ORDER — DEXAMETHASONE SODIUM PHOSPHATE 4 MG/ML
INJECTION, SOLUTION INTRA-ARTICULAR; INTRALESIONAL; INTRAMUSCULAR; INTRAVENOUS; SOFT TISSUE
Status: DISCONTINUED | OUTPATIENT
Start: 2022-01-25 | End: 2022-01-25 | Stop reason: HOSPADM

## 2022-01-25 RX ORDER — NEOMYCIN SULFATE, POLYMYXIN B SULFATE, AND DEXAMETHASONE 3.5; 10000; 1 MG/G; [USP'U]/G; MG/G
OINTMENT OPHTHALMIC
Status: DISCONTINUED | OUTPATIENT
Start: 2022-01-25 | End: 2022-01-25 | Stop reason: HOSPADM

## 2022-01-25 RX ORDER — CISATRACURIUM BESYLATE 2 MG/ML
INJECTION, SOLUTION INTRAVENOUS
Status: DISCONTINUED
Start: 2022-01-25 | End: 2022-01-25 | Stop reason: HOSPADM

## 2022-01-25 RX ORDER — LIDOCAINE HCL/PF 100 MG/5ML
SYRINGE (ML) INTRAVENOUS
Status: DISCONTINUED | OUTPATIENT
Start: 2022-01-25 | End: 2022-01-25

## 2022-01-25 RX ORDER — NEOSTIGMINE METHYLSULFATE 0.5 MG/ML
INJECTION, SOLUTION INTRAVENOUS
Status: DISCONTINUED | OUTPATIENT
Start: 2022-01-25 | End: 2022-01-25

## 2022-01-25 RX ORDER — VANCOMYCIN HYDROCHLORIDE 500 MG/10ML
INJECTION, POWDER, LYOPHILIZED, FOR SOLUTION INTRAVENOUS
Status: DISCONTINUED | OUTPATIENT
Start: 2022-01-25 | End: 2022-01-25 | Stop reason: HOSPADM

## 2022-01-25 RX ORDER — DEXTROSE MONOHYDRATE 25 G/50ML
INJECTION, SOLUTION INTRAVENOUS
Status: COMPLETED | OUTPATIENT
Start: 2022-01-25 | End: 2022-01-25

## 2022-01-25 RX ORDER — NEOMYCIN SULFATE, POLYMYXIN B SULFATE, AND DEXAMETHASONE 3.5; 10000; 1 MG/G; [USP'U]/G; MG/G
OINTMENT OPHTHALMIC
Status: DISCONTINUED
Start: 2022-01-25 | End: 2022-01-25 | Stop reason: HOSPADM

## 2022-01-25 RX ORDER — EPINEPHRINE 1 MG/ML
INJECTION, SOLUTION INTRACARDIAC; INTRAMUSCULAR; INTRAVENOUS; SUBCUTANEOUS
Status: DISCONTINUED | OUTPATIENT
Start: 2022-01-25 | End: 2022-01-25 | Stop reason: HOSPADM

## 2022-01-25 RX ORDER — MOXIFLOXACIN 5 MG/ML
1 SOLUTION/ DROPS OPHTHALMIC
Status: DISCONTINUED | OUTPATIENT
Start: 2022-01-25 | End: 2022-01-25 | Stop reason: HOSPADM

## 2022-01-25 RX ORDER — PREDNISOLONE ACETATE 10 MG/ML
1 SUSPENSION/ DROPS OPHTHALMIC
Status: DISCONTINUED | OUTPATIENT
Start: 2022-01-25 | End: 2022-01-25 | Stop reason: HOSPADM

## 2022-01-25 RX ORDER — PHENYLEPHRINE HYDROCHLORIDE 25 MG/ML
1 SOLUTION/ DROPS OPHTHALMIC
Status: DISCONTINUED | OUTPATIENT
Start: 2022-01-25 | End: 2022-01-25 | Stop reason: HOSPADM

## 2022-01-25 RX ORDER — LABETALOL HYDROCHLORIDE 5 MG/ML
INJECTION, SOLUTION INTRAVENOUS
Status: DISCONTINUED
Start: 2022-01-25 | End: 2022-01-25 | Stop reason: HOSPADM

## 2022-01-25 RX ORDER — ACETAMINOPHEN 325 MG/1
650 TABLET ORAL EVERY 4 HOURS PRN
Status: DISCONTINUED | OUTPATIENT
Start: 2022-01-25 | End: 2022-01-25 | Stop reason: HOSPADM

## 2022-01-25 RX ORDER — ONDANSETRON 2 MG/ML
INJECTION INTRAMUSCULAR; INTRAVENOUS
Status: DISCONTINUED | OUTPATIENT
Start: 2022-01-25 | End: 2022-01-25

## 2022-01-25 RX ADMIN — CISATRACURIUM BESYLATE 10 MG: 10 INJECTION INTRAVENOUS at 12:01

## 2022-01-25 RX ADMIN — DEXTROSE MONOHYDRATE 12.5 G: 25 INJECTION, SOLUTION INTRAVENOUS at 12:01

## 2022-01-25 RX ADMIN — PROPOFOL 50 MG: 10 INJECTION, EMULSION INTRAVENOUS at 12:01

## 2022-01-25 RX ADMIN — LIDOCAINE HYDROCHLORIDE 60 MG: 20 INJECTION, SOLUTION INTRAVENOUS at 12:01

## 2022-01-25 RX ADMIN — ATROPINE SULFATE 1 DROP: 10 SOLUTION OPHTHALMIC at 12:01

## 2022-01-25 RX ADMIN — MOXIFLOXACIN 1 DROP: 5 SOLUTION/ DROPS OPHTHALMIC at 12:01

## 2022-01-25 RX ADMIN — PROPOFOL 100 MG: 10 INJECTION, EMULSION INTRAVENOUS at 12:01

## 2022-01-25 RX ADMIN — PHENYLEPHRINE HYDROCHLORIDE 1 DROP: 25 SOLUTION/ DROPS OPHTHALMIC at 12:01

## 2022-01-25 RX ADMIN — PREDNISOLONE ACETATE 1 DROP: 10 SUSPENSION OPHTHALMIC at 12:01

## 2022-01-25 RX ADMIN — FENTANYL CITRATE 100 MCG: 50 INJECTION, SOLUTION INTRAMUSCULAR; INTRAVENOUS at 12:01

## 2022-01-25 RX ADMIN — DEXAMETHASONE SODIUM PHOSPHATE 8 MG: 4 INJECTION, SOLUTION INTRAMUSCULAR; INTRAVENOUS at 12:01

## 2022-01-25 RX ADMIN — TROPICAMIDE 1 DROP: 10 SOLUTION/ DROPS OPHTHALMIC at 12:01

## 2022-01-25 RX ADMIN — HYDROCODONE BITARTRATE AND ACETAMINOPHEN 1 TABLET: 5; 325 TABLET ORAL at 04:01

## 2022-01-25 RX ADMIN — NEOSTIGMINE METHYLSULFATE 5 MG: 0.5 INJECTION INTRAVENOUS at 03:01

## 2022-01-25 RX ADMIN — CYCLOPENTOLATE HYDROCHLORIDE 1 DROP: 10 SOLUTION OPHTHALMIC at 12:01

## 2022-01-25 RX ADMIN — TETRACAINE HYDROCHLORIDE 1 DROP: 5 SOLUTION OPHTHALMIC at 12:01

## 2022-01-25 RX ADMIN — GLYCOPYRROLATE 0.6 MG: 0.2 INJECTION INTRAMUSCULAR; INTRAVENOUS at 03:01

## 2022-01-25 RX ADMIN — ONDANSETRON 4 MG: 2 INJECTION INTRAMUSCULAR; INTRAVENOUS at 03:01

## 2022-01-25 RX ADMIN — SODIUM CHLORIDE: 0.9 INJECTION, SOLUTION INTRAVENOUS at 12:01

## 2022-01-25 RX ADMIN — MIDAZOLAM HYDROCHLORIDE 2 MG: 1 INJECTION, SOLUTION INTRAMUSCULAR; INTRAVENOUS at 12:01

## 2022-01-25 RX ADMIN — DEXTROSE MONOHYDRATE: 25 INJECTION, SOLUTION INTRAVENOUS at 01:01

## 2022-01-25 RX ADMIN — LABETALOL HYDROCHLORIDE 10 MG: 5 INJECTION INTRAVENOUS at 04:01

## 2022-01-25 NOTE — ANESTHESIA RELEASE NOTE
Anesthesia Release from PACU Note    Patient: Isabela Read    Procedure(s) Performed: Procedure(s) (LRB):  REPAIR, RETINAL DETACHMENT, WITH VITRECTOMY, MEMBRANE PEEL, LASER, INJECTION OF GAS VS OIL (Right)    Anesthesia type: general    Post pain: Adequate analgesia    Post assessment: no apparent anesthetic complications      Post vital signs: stable    Level of consciousness: awake    Nausea/Vomiting: no nausea/no vomiting    Complications: none    Airway Patency: patent    Respiratory: unassisted, spontaneous ventilation    Cardiovascular: stable, blood pressure at baseline and hypertensive    Hydration: euvolemic

## 2022-01-25 NOTE — DISCHARGE INSTRUCTIONS
Patient Education       Retinal Detachment Repair Discharge Instructions   About this topic   The retina is the light-sensitive part at the back of the eyes. Sometimes, the retina comes loose from the back of the eye. This is a detached retina and it can cause blindness. Retinal detachment repair is surgery done to put the retina back in its normal place to restore eyesight.     What care is needed at home?   · Ask your doctor what you need to do when you go home. Make sure you ask questions if you do not understand what the doctor says. This way you will know what you need to do.  · Your doctor will give you eye drops. Apply your drugs as instructed by your doctor. Wash your hands before touching your eyes.  · Talk to your doctor about how to care for your eyes. Ask your doctor about:  ? When you should change your bandages. Do not take the patch off of your eye until your doctor tells you to do so.  ? When you may take a bath, shower, or wash your face. Protect your eye from running water until your doctor allows you.  ? If you need to be careful with moving your head or lifting, pushing, or pulling things over 10 pounds (4.5 kg)  ? When you may go back to your normal activities like work or driving  · You can wear sunglasses when you go out.  · Do not touch your eyes. When your eye feels itchy, you may dampen a clean washcloth with cold water and gently wipe the itchy eye.  What follow-up care is needed?   Your doctor may ask you to make visits to the office to check on your progress. Be sure to keep these visits.  What drugs may be needed?   The doctor may order drugs to:  · Help with pain and swelling  · Prevent infection  · Relieve itching  Will physical activity be limited?   · Your doctor may ask you to hold your head in a certain position while your eye heals. You may have to lay on one side or facing down for 1 to 4 weeks after your surgery. Talk to your doctor about the best position for you. Ask for  advice on tools to making laying in this position easier.  · Ask your doctor about what activities are OK for you. Find out if computer work, reading, TV, or air travel is allowed. Each of these things requires you to use your eyes in a different way.  · Avoid contact sports and strenuous activities. Ask your doctor about bending or stooping.  · Avoid straining during bowel movements.  · Avoid sneezing with the mouth closed.  · Rest as much as possible to keep the eye from moving.  What problems could happen?   · Bleeding  · Infection  · Increased pressure in the eye  · Detachment that may require more surgeries  · Loss of eyesight  When do I need to call the doctor?   · Signs of infection. These include a fever of 100.4°F (38°C) or higher, chills.  · Redness, drainage, discharge, or bleeding from your eye  · Very bad pain in your eye  · Flashes of light appear in your vision or you have a dark curtain or shadow moving across your vision.  Teach Back: Helping You Understand   The Teach Back Method helps you understand the information we are giving you. After you talk with the staff, tell them in your own words what you learned. This helps to make sure the staff has described each thing clearly. It also helps to explain things that may have been confusing. Before going home, make sure you can do these:  · I can tell you about my procedure.  · I can tell you how to care for my eye.  · I can tell you what changes I need to make with my drugs or activities.  · I can tell you what I will do if I have swelling, redness, drainage, pain, or a change in eyesight.  Where can I learn more?   American Academy of Ophthalmology  http://www.geteyesmart.org/eyesmart/diseases/detached-torn-retina-treatment.cfm   NHS Choices  https://www.nhs.uk/conditions/detached-retina-retinal-detachment/   Last Reviewed Date   2020-08-24  Consumer Information Use and Disclaimer   This information is not specific medical advice and does not  replace information you receive from your health care provider. This is only a brief summary of general information. It does NOT include all information about conditions, illnesses, injuries, tests, procedures, treatments, therapies, discharge instructions or life-style choices that may apply to you. You must talk with your health care provider for complete information about your health and treatment options. This information should not be used to decide whether or not to accept your health care providers advice, instructions or recommendations. Only your health care provider has the knowledge and training to provide advice that is right for you.  Copyright   Copyright © 2021 [employer] and its affiliates and/or licensors. All rights reserved.

## 2022-01-25 NOTE — DISCHARGE SUMMARY
[facility] ([facility])  Discharge Note  Short Stay    Date of Admission: 01/25/2022    Procedure(s) (LRB):  REPAIR, RETINAL DETACHMENT, WITH VITRECTOMY, MEMBRANE PEEL, LASER, INJECTION OF GAS VS OIL (Right)    OUTCOME: Patient tolerated treatment/procedure well without complication and is now ready for discharge.    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, right eye    FOLLOWUP: In clinic    DISCHARGE INSTRUCTIONS:    Discharge Procedure Orders   Diet general     Sponge bath only until clinic visit     Lifting restrictions     Call MD for:  temperature >100.4     Call MD for:  persistent nausea and vomiting     Call MD for:  severe uncontrolled pain     Call MD for:  difficulty breathing, headache or visual disturbances     Call MD for:  redness, tenderness, or signs of infection (pain, swelling, redness, odor or green/yellow discharge around incision site)     Call MD for:  hives     Call MD for:  persistent dizziness or light-headedness     Call MD for:  extreme fatigue     Call MD for:     Leave dressing on - Keep it clean, dry, and intact until clinic visit        TIME SPENT ON DISCHARGE: 15 minutes

## 2022-01-25 NOTE — ANESTHESIA PREPROCEDURE EVALUATION
01/25/2022  Isabela Read is a 26 y.o., female with HTN, DM, ESRD on HD last dialysis was this am. She was recently hospitalized for htn emergency and pulmonary edema also found to be in DKA. She reports feeling well since discharge. Here for below procedure    Pre-operative evaluation for Procedure(s) (LRB):  REPAIR, RETINAL DETACHMENT, WITH VITRECTOMY, MEMBRANE PEEL, LASER, INJECTION OF GAS VS OIL (Right)    Isabela Read is a 26 y.o. female     Patient Active Problem List   Diagnosis    Renovascular hypertension    Type 1 diabetes mellitus with end-stage renal disease (ESRD)    Ventricular bigeminy    Vitamin D deficiency    Mixed hyperlipidemia    Uremia    Anemia in pregnancy    Restrictive lung disease    Iron deficiency anemia    Anxiety    Decreased strength of lower extremity    Decreased range of motion of trunk and back    Hyperglycemia due to type 1 diabetes mellitus    Recurrent major depressive disorder, in partial remission    Metabolic acidosis    Anemia due to chronic kidney disease    Nephrotic syndrome    Anasarca    Secondary hyperparathyroidism    Hypoalbuminemia    Uncontrolled type 1 diabetes mellitus with hyperglycemia    Acute kidney injury superimposed on chronic kidney disease    Hepatomegaly    End stage renal failure on dialysis    Hypertension, essential    Organ transplant candidate    Iron overload    Arteriovenous fistula occlusion    ESRD (end stage renal disease)    Type 1 diabetes mellitus with ketoacidosis without coma    Anemia due to chronic kidney disease, on chronic dialysis       Review of patient's allergies indicates:  No Known Allergies    Current Facility-Administered Medications on File Prior to Encounter   Medication Dose Route Frequency Provider Last Rate Last Admin    0.9%  NaCl infusion   Intravenous  Continuous Tavo Becker MD 25 mL/hr at 12/10/21 1043 New Bag at 12/10/21 1043     Current Outpatient Medications on File Prior to Encounter   Medication Sig Dispense Refill    blood sugar diagnostic Strp To check BG 4 - 6 times daily, to use with insurance preferred meter 150 each 11    calcitRIOL (ROCALTROL) 0.5 MCG Cap Take 0.5 mcg by mouth once daily.      ferrous sulfate 325 (65 FE) MG EC tablet Take 1 tablet (325 mg total) by mouth 2 (two) times daily. 180 tablet 2    insulin detemir U-100 (LEVEMIR FLEXTOUCH) 100 unit/mL (3 mL) SubQ InPn pen INJECT 10 UNITS under the skin in the morning and 12 UNITS at night 21 mL 3    insulin lispro (HUMALOG KWIKPEN INSULIN) 100 unit/mL pen Inject 10 units into skin the skin 3 three times daily with meals 15 mL 6    NIFEdipine (PROCARDIA-XL) 60 MG (OSM) 24 hr tablet Take 1 tablet (60 mg total) by mouth 2 (two) times a day. 60 tablet 11    rosuvastatin (CRESTOR) 20 MG tablet Take 1 tablet (20 mg total) by mouth every evening. 90 tablet 3    aspirin (ECOTRIN) 81 MG EC tablet Take 1 tablet (81 mg total) by mouth every evening. 150 tablet 1    carvediloL (COREG) 25 MG tablet Take 1 tablet (25 mg total) by mouth 2 (two) times daily. 60 tablet 11    escitalopram oxalate (LEXAPRO) 20 MG tablet Take 1 tablet (20 mg total) by mouth once daily. (Patient taking differently: Take 20 mg by mouth daily as needed (depression/anxiety).) 30 tablet 11    flash glucose scanning reader (FREESTYLE MARCY 14 DAY READER) Misc 1 each by Misc.(Non-Drug; Combo Route) route once daily. 1 each 3    flash glucose sensor (FREESTYLE MARCY 14 DAY SENSOR) Kit 6 kits by Misc.(Non-Drug; Combo Route) route once daily. 6 kit 3    hydrALAZINE (APRESOLINE) 25 MG tablet Take 1 tablet (25 mg total) by mouth 2 (two) times a day. 60 tablet 3    lancets Misc To check BG 4 - 6 times daily, to use with insurance preferred meter 150 each 11    medroxyPROGESTERone (DEPO-PROVERA) 150 mg/mL Syrg Inject 1 mL  "(150 mg total) into the muscle once. for 1 dose 1 mL 3    pen needle, diabetic 32 gauge x 5/32" Ndle For three times daily injection 100 each 11    traZODone (DESYREL) 50 MG tablet Take 1 tablet (50 mg total) by mouth nightly as needed for Insomnia. 30 tablet 1       Past Surgical History:   Procedure Laterality Date    AV FISTULA PLACEMENT Left 4/7/2021    Procedure: CREATION, AV FISTULA;  Surgeon: Roberto Ryan MD;  [facility];  Service: Peripheral Vascular;  Laterality: Left;    FISTULOGRAM N/A 8/11/2021    Procedure: Fistulogram;  Surgeon: Roberto Ryan MD;  Location: [facility] CATH LAB;  Service: Cardiology;  Laterality: N/A;    PERCUTANEOUS TRANSLUMINAL ANGIOPLASTY OF ARTERIOVENOUS FISTULA N/A 8/11/2021    Procedure: PTA, AV FISTULA;  Surgeon: Roberto Ryan MD;  Location: [facility] CATH LAB;  Service: Cardiology;  Laterality: N/A;    REVISION OF ARTERIOVENOUS FISTULA Left 12/10/2021    Procedure: REVISION, AV FISTULA with BVT;  Surgeon: Roberto Ryan MD;  [facility];  Service: Peripheral Vascular;  Laterality: Left;       Social History     Socioeconomic History    Marital status: Single   Occupational History    Occupation:  at [employer]   Tobacco Use    Smoking status: Never Smoker    Smokeless tobacco: Never Used   Substance and Sexual Activity    Alcohol use: No    Drug use: No    Sexual activity: Not Currently     Partners: Male     Comment: monogamous   Social History Narrative    Caregiver        Single    No kids    Had Miscarriage  At 23 weeks from preeclampsia    Disabled         2D Echo:  No results found for this or any previous visit.  Anesthesia Evaluation    I have reviewed the Patient Summary Reports.   I have reviewed the NPO Status.   I have reviewed the Medications.     Review of Systems  Anesthesia Hx:  No problems with previous Anesthesia  History of prior surgery of interest to airway management or planning:  Denies " Personal Hx of Anesthesia complications.   Cardiovascular:   Hypertension, poorly controlled    Pulmonary:   Restrictive lung dis   Renal/:   Chronic Renal Disease, ESRD, Dialysis    Neurological:  Neurology Normal    Endocrine:   Diabetes, poorly controlled, type 1, using insulin        Physical Exam  General:  Well nourished    Airway/Jaw/Neck:  Airway Findings: Mouth Opening: Normal Tongue: Normal  General Airway Assessment: Adult  Improves to II with phonation.  TM Distance: Normal, at least 6 cm      Dental:  Dental Findings: In tact        Mental Status:  Mental Status Findings:  Cooperative, Alert and Oriented         Anesthesia Plan  Type of Anesthesia, risks & benefits discussed:  Anesthesia Type:  general    Patient's Preference:   Plan Factors:          Intra-op Monitoring Plan: standard ASA monitors  Intra-op Monitoring Plan Comments:   Post Op Pain Control Plan: per primary service following discharge from PACU  Post Op Pain Control Plan Comments:     Induction:   IV  Beta Blocker:  Patient is not currently on a Beta-Blocker (No further documentation required).       Informed Consent: Patient understands risks and agrees with Anesthesia plan.  Questions answered. Anesthesia consent signed with patient.  ASA Score: 3     Day of Surgery Review of History & Physical:    H&P update referred to the surgeon.         Ready For Surgery From Anesthesia Perspective.

## 2022-01-25 NOTE — H&P
Pre-Operative History & Physical  Ophthalmology      SUBJECTIVE:     History of Present Illness:  Patient is a 26 y.o. 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    MEDICATIONS:   PTA Medications   Medication Sig    aspirin (ECOTRIN) 81 MG EC tablet Take 1 tablet (81 mg total) by mouth every evening.    blood sugar diagnostic Strp To check BG 4 - 6 times daily, to use with insurance preferred meter    calcitRIOL (ROCALTROL) 0.5 MCG Cap Take 0.5 mcg by mouth once daily.    carvediloL (COREG) 25 MG tablet Take 1 tablet (25 mg total) by mouth 2 (two) times daily.    escitalopram oxalate (LEXAPRO) 20 MG tablet Take 1 tablet (20 mg total) by mouth once daily. (Patient taking differently: Take 20 mg by mouth daily as needed (depression/anxiety).)    ferrous sulfate 325 (65 FE) MG EC tablet Take 1 tablet (325 mg total) by mouth 2 (two) times daily.    flash glucose scanning reader (FREESTYLE MARCY 14 DAY READER) Misc 1 each by Misc.(Non-Drug; Combo Route) route once daily.    flash glucose sensor (FREESTYLE MARCY 14 DAY SENSOR) Kit 6 kits by Misc.(Non-Drug; Combo Route) route once daily.    hydrALAZINE (APRESOLINE) 25 MG tablet Take 1 tablet (25 mg total) by mouth 2 (two) times a day.    insulin detemir U-100 (LEVEMIR FLEXTOUCH) 100 unit/mL (3 mL) SubQ InPn pen INJECT 10 UNITS under the skin in the morning and 12 UNITS at night    insulin lispro (HUMALOG KWIKPEN INSULIN) 100 unit/mL pen Inject 10 units into skin the skin 3 three times daily with meals    lancets Misc To check BG 4 - 6 times daily, to use with insurance preferred meter    medroxyPROGESTERone (DEPO-PROVERA) 150 mg/mL Syrg Inject 1 mL (150 mg total) into the muscle once. for 1 dose    NIFEdipine (PROCARDIA-XL) 60 MG (OSM) 24 hr tablet Take 1 tablet (60 mg total) by mouth 2 (two) times a  "day.    oxyCODONE (ROXICODONE) 5 MG immediate release tablet Take 1 tablet (5 mg total) by mouth every 6 (six) hours as needed for Pain. (Patient not taking: Reported on 1/20/2022)    pen needle, diabetic 32 gauge x 5/32" Ndle For three times daily injection    rosuvastatin (CRESTOR) 20 MG tablet Take 1 tablet (20 mg total) by mouth every evening.    sevelamer carbonate (RENVELA) 800 mg Tab TAKE 1 TABLET BY MOUTH THREE TIMES DAILY WITH MEALS    traZODone (DESYREL) 50 MG tablet Take 1 tablet (50 mg total) by mouth nightly as needed for Insomnia.       ALLERGIES: Review of patient's allergies indicates:  No Known Allergies    PAST MEDICAL HISTORY:   Past Medical History:   Diagnosis Date    Anemia     Chronic hypertension with exacerbation during pregnancy in second trimester 11/6/2020    Current regimen (11/6/20):  - Carvedilol 12.5 mg BID - Nifedipine 30 mg daily Baseline CKD + proteinuria    Chronic kidney disease     Depression     Diabetes mellitus     Diarrhea     Encounter for blood transfusion     Gastroparesis     Hepatomegaly 4/29/2021    Hx of psychiatric care     Hyperlipidemia     Hypertension     Nephrotic syndrome     Nephrotic syndrome 3/4/2021    Palpitations     Poor fetal growth affecting management of mother in second trimester 10/15/2020    Restrictive lung disease     Severe pre-eclampsia in second trimester 11/6/2020     PAST SURGICAL HISTORY:   Past Surgical History:   Procedure Laterality Date    AV FISTULA PLACEMENT Left 4/7/2021    Procedure: CREATION, AV FISTULA;  Surgeon: Roberto Ryan MD;  Location: [facility] OR [address] [place] [address];  Service: Peripheral Vascular;  Laterality: Left;    FISTULOGRAM N/A 8/11/2021    Procedure: Fistulogram;  Surgeon: Roberto Ryan MD;  Location: [facility] CATH LAB;  Service: Cardiology;  Laterality: N/A;    PERCUTANEOUS TRANSLUMINAL ANGIOPLASTY OF ARTERIOVENOUS FISTULA N/A 8/11/2021    Procedure: PTA, AV FISTULA;  Surgeon: Roberto FUNEZ" MD Shelby;  Location: [facility] CATH LAB;  Service: Cardiology;  Laterality: N/A;    REVISION OF ARTERIOVENOUS FISTULA Left 12/10/2021    Procedure: REVISION, AV FISTULA with BVT;  Surgeon: Roberto Ryan MD;  Location: [facility] OR [address] [place] [address];  Service: Peripheral Vascular;  Laterality: Left;     PAST FAMILY HISTORY:   Family History   Problem Relation Age of Onset    Hypertension Mother     Heart disease Father     Diabetes Brother     Celiac disease Neg Hx     Cirrhosis Neg Hx     Colon cancer Neg Hx     Colon polyps Neg Hx     Crohn's disease Neg Hx     Inflammatory bowel disease Neg Hx     Liver cancer Neg Hx     Liver disease Neg Hx     Rectal cancer Neg Hx     Stomach cancer Neg Hx     Ulcerative colitis Neg Hx     Esophageal cancer Neg Hx     Hemochromatosis Neg Hx     Pancreatic cancer Neg Hx     Kidney cancer Neg Hx     Bladder Cancer Neg Hx     Uterine cancer Neg Hx     Ovarian cancer Neg Hx      SOCIAL HISTORY:   Social History     Tobacco Use    Smoking status: Never Smoker    Smokeless tobacco: Never Used   Substance Use Topics    Alcohol use: No    Drug use: No        MENTAL STATUS: Alert    REVIEW OF SYSTEMS: Negative    OBJECTIVE:     Vital Signs (Most Recent)       Physical Exam:  General: NAD  HEENT: EOM intact.   Lungs: Adequate respirations, symmetrical movements, non-labored  Heart: Intact distal pulses  Abdomen: Soft, nondistended, nontender    ASSESSMENT/PLAN:     Patient is a 26 y.o. 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  Had long conversation and advanced nature of disease discussed. Possible  vision loss with and without surgery discussed    RBA discussed in detail, inc surgical failure, need for more surgery, loss of vision/eye, no recovery of vision, surgical failure, bleeding, infection, high eye pressure (glaucoma) cataract, etc.  Pt wishes to proceed.    PPV/MP/EL/gas vs SO/Avastin OD  GA     - Posterior Segment OCT Retina-Both eyes  - Plan for REPAIR, RETINAL DETACHMENT, WITH VITRECTOMY, MEMBRANE PEEL, LASER, INJECTION OF GAS VS OIL     Type 1 diabetes mellitus with proliferative retinopathy of left eye without macular edema  S/p PRP OS last clinic visit, monitor       - Plan for surgical correction with PPV/MP/EL/gas vs SO/Avastin for right eye (OD).   - Allergies reviewed: Review of patient's allergies indicates:  No Known Allergies  - Has not taken blood thinners (includes ASA, NSAIDS, BC Powder, Goody's Powder, Excedrine, Eliquis, Xarelto, Pradaxa, etc.) for over 5 days.   - Risks/benefits/alternatives of the procedure including, but not limited to scarring, bleeding, infection, loss or decreased vision, and/or need for possible repeat surgery discussed with the patient and family.  - Informed consent obtained prior to surgery and the patient/family voiced good understanding.    Florentin Sanchez MD  [facility], PGY2

## 2022-01-25 NOTE — OP NOTE
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 The patient was properly identified and taken to the operating room. General anesthesia was begun by the anesthesia team. The right eye was prepped and draped in the standard ophthalmic fashion taking care to exclude the lashes from the operative field.     Vitrectomy setup was achieved a 27 gauge infusion port and two superior 25 gauge ports in order to accommodate all available instrumentation.  All ports were placed 4.0 mm posterior to the limbus. The [employer] visualization system alternating with a high magnification contact lens were used. A 27 gauge cutter was used for this case.  Core vitrectomy was performed. The hyaloid membrane was down over the entire retina.  The hyaloid was invested in posterior broad adhesions along the temporal and nasal arcades and the optic nerve causing tractional and bullous elevation with folds of almost all of the retina except for the far periphery and the central macula.  Meticulous dissection and removal of the posterior membranes and hyaloid was performed using the cutter and forceps.  Perfluorocarbon liquid was then placed over the posterior pole using a dual bore cannula  to stabilize the macula while the membrane peeling progressed.  The peripheral membranes and hyaloid were dissected and removed using the cutter and forceps.  All traction was relieved.  The membrane removal revealed two small hole stretch holes, inferonasally and inferotemporally, and a large hole superonasally.  The peripheral vitreous was shaved to the vitreous base.  The retina was inspected for 360 degrees to the ora nora using scleral depression.  There were no breaks except for the ones previously noted.  The holes were marked with diathermy.  Fluid/air exchange was performed.  The retina was lying nicely flat under air.  Endolaser panretinal photocoagulation was performed 360 degrees being sure the surround the holes.  The eye was filled with 5000cs silicone oil.     The ports were removed. The infusion cannula was removed.  All wounds were closed with 7-0 Vicryl suture, inspected, and noted not to be leaking.  The eye was normotensive.  A subconjunctival injection of vancomycin and dexamethasone was placed.      The eye was patched. A Little shield was placed. The patient was awakened from general anesthesia by the anesthesia team having tolerated the procedure well.

## 2022-01-25 NOTE — BRIEF OP NOTE
Date of Procedure: 01/25/2022     Pre-Op Dx:  Type 1 diabetes with tractional retinal detachment involving macula, right eye    Post Op Dx: Same    Procedure Performed: Repair of complex retinal detachment by pars plana vitrectomy, membrane peel, endolaser, injection of 5000cs silicone oil, right eye    Attending Surgeon: ARMIN Montaño    Assistant Surgeon: ARMIN Bobo    Anesthesia: General    Estimated blood loss: Minimal    Complication: None    Specimen: None    Disposition: Stable to recovery    Findings: near total retinal detachment with three holes    Outcome: Retina attached    Date of Discharge: 01/25/2022    Discharge Disposition: Home    F/U: 01/26/22

## 2022-01-25 NOTE — ANESTHESIA PROCEDURE NOTES
Intubation    Date/Time: 1/25/2022 12:48 PM  Performed by: Marissa Dorantes CRNA  Authorized by: Viola Abraham MD     Intubation:     Induction:  Intravenous    Intubated:  Postinduction    Mask Ventilation:  Easy mask    Attempts:  1    Attempted By:  CRNA    Method of Intubation:  Direct    Blade:  Bailey 2    Laryngeal View Grade: Grade I - full view of cords      Difficult Airway Encountered?: No      Complications:  None    Airway Device:  Oral endotracheal tube    Airway Device Size:  7.0    Style/Cuff Inflation:  Cuffed    Tube secured:  20    Secured at:  The lips    Placement Verified By:  Capnometry    Complicating Factors:  None    Findings Post-Intubation:  BS equal bilateral and atraumatic/condition of teeth unchanged

## 2022-01-25 NOTE — TRANSFER OF CARE
Anesthesia Transfer of Care Note    Patient: Isabela Read    Procedure(s) Performed: Procedure(s) (LRB):  REPAIR, RETINAL DETACHMENT, WITH VITRECTOMY, MEMBRANE PEEL, LASER, INJECTION OF GAS VS OIL (Right)    Patient location: [facility]    Anesthesia Type: general    Transport from OR: Transported from OR on 6-10 L/min O2 by face mask with adequate spontaneous ventilation    Post pain: adequate analgesia    Post assessment: no apparent anesthetic complications    Post vital signs: stable    Level of consciousness: awake    Nausea/Vomiting: no nausea/vomiting    Complications: none    Transfer of care protocol was followed      Last vitals:   Visit Vitals  BP (!) 195/118   Pulse 84   Temp 37.1 °C (98.8 °F) (Temporal)   Resp 18   Wt 61.6 kg (135 lb 12.9 oz)   SpO2 100%   Breastfeeding No   BMI 23.31 kg/m²

## 2022-01-26 ENCOUNTER — ANESTHESIA (OUTPATIENT)
Dept: ENDOSCOPY | Facility: HOSPITAL | Age: 27
End: 2022-01-26
Payer: MEDICARE

## 2022-01-26 ENCOUNTER — OFFICE VISIT (OUTPATIENT)
Dept: OPHTHALMOLOGY | Facility: CLINIC | Age: 27
End: 2022-01-26
Payer: MEDICARE

## 2022-01-26 DIAGNOSIS — E10.3521 TYPE 1 DIABETES MELLITUS WITH RIGHT EYE AFFECTED BY PROLIFERATIVE RETINOPATHY AND TRACTION RETINAL DETACHMENT INVOLVING MACULA: Primary | ICD-10-CM

## 2022-01-26 LAB — POCT GLUCOSE: 99 MG/DL (ref 70–110)

## 2022-01-26 PROCEDURE — 3066F PR DOCUMENTATION OF TREATMENT FOR NEPHROPATHY: ICD-10-PCS | Mod: CPTII,,, | Performed by: STUDENT IN AN ORGANIZED HEALTH CARE EDUCATION/TRAINING PROGRAM

## 2022-01-26 PROCEDURE — 99024 POSTOP FOLLOW-UP VISIT: CPT | Mod: ,,, | Performed by: STUDENT IN AN ORGANIZED HEALTH CARE EDUCATION/TRAINING PROGRAM

## 2022-01-26 PROCEDURE — 3066F NEPHROPATHY DOC TX: CPT | Mod: CPTII,,, | Performed by: STUDENT IN AN ORGANIZED HEALTH CARE EDUCATION/TRAINING PROGRAM

## 2022-01-26 PROCEDURE — 99024 PR POST-OP FOLLOW-UP VISIT: ICD-10-PCS | Mod: ,,, | Performed by: STUDENT IN AN ORGANIZED HEALTH CARE EDUCATION/TRAINING PROGRAM

## 2022-01-26 NOTE — PROGRESS NOTES
HPI     1 day post op    S/p Repair of complex retinal detachment by pars plana vitrectomy,   membrane peel, endolaser, injection of 5000cs silicone oil, right eye    Last edited by Zulma Yao on 1/26/2022 10:11 AM. (History)            Assessment /Plan     For exam results, see Encounter Report.    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 POD #1 s/p 25G PPV/MS/EL/5000cs SO right eye for TRD repair right eye  - Doing well   - IOP 35, start CS BID  - start PF QID, Vigamox QID, Atropine BID, Maxitrol matilde qHS  - No vigorous activity, no lifting, bending, etc.  - Little shield when sleeping  - Little shield, glasses, or sunglasses all times for protection   - No shower   - right side down positioning  - RD/Endophthalmitis precautions       RTC 1 wk, sooner PRN if any problems (laure pain, redness, dimming or loss of vision)

## 2022-01-26 NOTE — PLAN OF CARE
Discharge instructions reviewed with pt and pt's mother at bedside. Verbalized understanding. Packet given. Eyedrops given to pt's mother at bedside.

## 2022-01-26 NOTE — ANESTHESIA POSTPROCEDURE EVALUATION
Anesthesia Post Evaluation    Patient: Isabela Read    Procedure(s) Performed: Procedure(s) (LRB):  REPAIR, RETINAL DETACHMENT, WITH VITRECTOMY, MEMBRANE PEEL, LASER, INJECTION OF GAS VS OIL (Right)    Final Anesthesia Type: general      Patient location during evaluation: PACU  Patient participation: Yes- Able to Participate  Level of consciousness: awake and alert and oriented  Post-procedure vital signs: reviewed and stable  Pain management: adequate  Airway patency: patent    PONV status at discharge: No PONV  Anesthetic complications: no      Cardiovascular status: blood pressure returned to baseline, hemodynamically stable and stable  Respiratory status: unassisted, room air and spontaneous ventilation  Hydration status: euvolemic  Follow-up not needed.          Vitals Value Taken Time   /106 01/25/22 1726   Temp  01/26/22 0553   Pulse 89 01/25/22 1726   Resp 18 01/25/22 1726   SpO2 100 % 01/25/22 1726   Vitals shown include unvalidated device data.      No case tracking events are documented in the log.      Pain/Renny Score: Pain Rating Prior to Med Admin: 6 (1/25/2022  4:27 PM)  Renny Score: 9 (1/25/2022  4:45 PM)

## 2022-01-27 ENCOUNTER — OFFICE VISIT (OUTPATIENT)
Dept: ENDOCRINOLOGY | Facility: CLINIC | Age: 27
End: 2022-01-27
Payer: MEDICARE

## 2022-01-27 VITALS
SYSTOLIC BLOOD PRESSURE: 160 MMHG | WEIGHT: 139.88 LBS | OXYGEN SATURATION: 99 % | BODY MASS INDEX: 23.88 KG/M2 | HEIGHT: 64 IN | RESPIRATION RATE: 18 BRPM | HEART RATE: 106 BPM | DIASTOLIC BLOOD PRESSURE: 88 MMHG

## 2022-01-27 DIAGNOSIS — N18.5 TYPE 1 DIABETES MELLITUS WITH STAGE 5 CHRONIC KIDNEY DISEASE NOT ON CHRONIC DIALYSIS: ICD-10-CM

## 2022-01-27 DIAGNOSIS — E10.22 TYPE 1 DIABETES MELLITUS WITH STAGE 5 CHRONIC KIDNEY DISEASE NOT ON CHRONIC DIALYSIS: ICD-10-CM

## 2022-01-27 DIAGNOSIS — E10.22 TYPE 1 DIABETES MELLITUS WITH END-STAGE RENAL DISEASE (ESRD): ICD-10-CM

## 2022-01-27 DIAGNOSIS — N18.6 TYPE 1 DIABETES MELLITUS WITH END-STAGE RENAL DISEASE (ESRD): ICD-10-CM

## 2022-01-27 PROCEDURE — 3008F BODY MASS INDEX DOCD: CPT | Mod: CPTII,NTX,, | Performed by: INTERNAL MEDICINE

## 2022-01-27 PROCEDURE — 1159F PR MEDICATION LIST DOCUMENTED IN MEDICAL RECORD: ICD-10-PCS | Mod: CPTII,NTX,, | Performed by: INTERNAL MEDICINE

## 2022-01-27 PROCEDURE — 99213 OFFICE O/P EST LOW 20 MIN: CPT | Mod: S$PBB,NTX,, | Performed by: INTERNAL MEDICINE

## 2022-01-27 PROCEDURE — 99213 PR OFFICE/OUTPT VISIT, EST, LEVL III, 20-29 MIN: ICD-10-PCS | Mod: S$PBB,NTX,, | Performed by: INTERNAL MEDICINE

## 2022-01-27 PROCEDURE — 3008F PR BODY MASS INDEX (BMI) DOCUMENTED: ICD-10-PCS | Mod: CPTII,NTX,, | Performed by: INTERNAL MEDICINE

## 2022-01-27 PROCEDURE — 3066F PR DOCUMENTATION OF TREATMENT FOR NEPHROPATHY: ICD-10-PCS | Mod: CPTII,NTX,, | Performed by: INTERNAL MEDICINE

## 2022-01-27 PROCEDURE — 1111F DSCHRG MED/CURRENT MED MERGE: CPT | Mod: CPTII,NTX,, | Performed by: INTERNAL MEDICINE

## 2022-01-27 PROCEDURE — 3077F PR MOST RECENT SYSTOLIC BLOOD PRESSURE >= 140 MM HG: ICD-10-PCS | Mod: CPTII,NTX,, | Performed by: INTERNAL MEDICINE

## 2022-01-27 PROCEDURE — 1160F PR REVIEW ALL MEDS BY PRESCRIBER/CLIN PHARMACIST DOCUMENTED: ICD-10-PCS | Mod: CPTII,NTX,, | Performed by: INTERNAL MEDICINE

## 2022-01-27 PROCEDURE — 3077F SYST BP >= 140 MM HG: CPT | Mod: CPTII,NTX,, | Performed by: INTERNAL MEDICINE

## 2022-01-27 PROCEDURE — 1160F RVW MEDS BY RX/DR IN RCRD: CPT | Mod: CPTII,NTX,, | Performed by: INTERNAL MEDICINE

## 2022-01-27 PROCEDURE — 1159F MED LIST DOCD IN RCRD: CPT | Mod: CPTII,NTX,, | Performed by: INTERNAL MEDICINE

## 2022-01-27 PROCEDURE — 99999 PR PBB SHADOW E&M-EST. PATIENT-LVL IV: ICD-10-PCS | Mod: PBBFAC,TXP,, | Performed by: INTERNAL MEDICINE

## 2022-01-27 PROCEDURE — 1111F PR DISCHARGE MEDS RECONCILED W/ CURRENT OUTPATIENT MED LIST: ICD-10-PCS | Mod: CPTII,NTX,, | Performed by: INTERNAL MEDICINE

## 2022-01-27 PROCEDURE — 3079F PR MOST RECENT DIASTOLIC BLOOD PRESSURE 80-89 MM HG: ICD-10-PCS | Mod: CPTII,NTX,, | Performed by: INTERNAL MEDICINE

## 2022-01-27 PROCEDURE — 99999 PR PBB SHADOW E&M-EST. PATIENT-LVL IV: CPT | Mod: PBBFAC,TXP,, | Performed by: INTERNAL MEDICINE

## 2022-01-27 PROCEDURE — 3079F DIAST BP 80-89 MM HG: CPT | Mod: CPTII,NTX,, | Performed by: INTERNAL MEDICINE

## 2022-01-27 PROCEDURE — 3066F NEPHROPATHY DOC TX: CPT | Mod: CPTII,NTX,, | Performed by: INTERNAL MEDICINE

## 2022-01-27 RX ORDER — INSULIN LISPRO 100 [IU]/ML
INJECTION, SOLUTION INTRAVENOUS; SUBCUTANEOUS
Qty: 15 ML | Refills: 6 | Status: ON HOLD | OUTPATIENT
Start: 2022-01-27 | End: 2022-11-08 | Stop reason: HOSPADM

## 2022-01-27 NOTE — ASSESSMENT & PLAN NOTE
No freestyle ally data, provided sensor and reader  Continue current dose for now:  levemir 10 units in the morning and 12 units in the morning.   humalog 10 units before meals   F/u  In three months

## 2022-01-27 NOTE — PROGRESS NOTES
"Subjective:      Patient ID: Isabela Read is a 26 y.o. female.    Chief Complaint:  Diabetes      History of Present Illness    Recent discharged from hospital   Just finished HD and feels tired.   Had eye surgery recently. Mild discomfort.     Current regimen:   Levemir 10 units in the morning and 10 at night  Humalog -->   10 units before meals   Hypoglycemia when she forgets to eat after insulin administration    No logs for review due to recent admission  Needs freestyle ally 2    Denies any issues with injections sites or injections. Using abdomen, right arm and legs. (discussed importance of "pinch and inch" to inject)    Review of Systems   No recent illness  Otherwise negative ROS    Objective:   Physical Exam  Vitals:    01/27/22 1449   BP: (!) 160/88   BP Location: Right arm   Patient Position: Sitting   BP Method: Small (Manual)   Pulse: 106   Resp: 18   SpO2: 99%   Weight: 63.5 kg (139 lb 14.1 oz)   Height: 5' 4" (1.626 m)       BP Readings from Last 3 Encounters:   01/27/22 (!) 160/88   01/25/22 (!) 208/116   01/19/22 (!) 140/80     Wt Readings from Last 1 Encounters:   01/27/22 1449 63.5 kg (139 lb 14.1 oz)         Body mass index is 24.01 kg/m².    Lab Review:   Lab Results   Component Value Date    HGBA1C 10.9 (H) 10/23/2021     Lab Results   Component Value Date    CHOL 135 10/11/2021    HDL 71 10/11/2021    LDLCALC 56.8 (L) 10/11/2021    TRIG 36 10/11/2021    CHOLHDL 52.6 (H) 10/11/2021     Lab Results   Component Value Date     01/19/2022    K 4.2 01/19/2022     01/19/2022    CO2 27 01/19/2022     (H) 01/19/2022    BUN 14 01/19/2022    CREATININE 2.8 (H) 01/19/2022    CALCIUM 8.3 (L) 01/19/2022    PROT 6.1 01/18/2022    ALBUMIN 2.4 (L) 01/19/2022    BILITOT 0.8 01/18/2022    ALKPHOS 102 01/18/2022    AST 18 01/18/2022    ALT 13 01/18/2022    ANIONGAP 9 01/19/2022    ESTGFRAFRICA 25.9 (A) 01/19/2022    EGFRNONAA 22.4 (A) 01/19/2022    TSH 3.786 08/04/2020 "         Assessment and Plan     Type 1 diabetes mellitus with end-stage renal disease (ESRD)  No freestyle ally data, provided sensor and reader  Continue current dose for now:  levemir 10 units in the morning and 12 units in the morning.   humalog 10 units before meals   F/u  In three months

## 2022-01-31 ENCOUNTER — OFFICE VISIT (OUTPATIENT)
Dept: OPHTHALMOLOGY | Facility: CLINIC | Age: 27
End: 2022-01-31
Payer: MEDICARE

## 2022-01-31 DIAGNOSIS — E10.3521 TYPE 1 DIABETES MELLITUS WITH RIGHT EYE AFFECTED BY PROLIFERATIVE RETINOPATHY AND TRACTION RETINAL DETACHMENT INVOLVING MACULA: Primary | ICD-10-CM

## 2022-01-31 PROCEDURE — 99024 PR POST-OP FOLLOW-UP VISIT: ICD-10-PCS | Mod: ,,, | Performed by: OPHTHALMOLOGY

## 2022-01-31 PROCEDURE — 99999 PR PBB SHADOW E&M-EST. PATIENT-LVL III: CPT | Mod: PBBFAC,,, | Performed by: OPHTHALMOLOGY

## 2022-01-31 PROCEDURE — 1159F PR MEDICATION LIST DOCUMENTED IN MEDICAL RECORD: ICD-10-PCS | Mod: CPTII,,, | Performed by: OPHTHALMOLOGY

## 2022-01-31 PROCEDURE — 3066F NEPHROPATHY DOC TX: CPT | Mod: CPTII,,, | Performed by: OPHTHALMOLOGY

## 2022-01-31 PROCEDURE — 1160F RVW MEDS BY RX/DR IN RCRD: CPT | Mod: CPTII,,, | Performed by: OPHTHALMOLOGY

## 2022-01-31 PROCEDURE — 99024 POSTOP FOLLOW-UP VISIT: CPT | Mod: ,,, | Performed by: OPHTHALMOLOGY

## 2022-01-31 PROCEDURE — 99999 PR PBB SHADOW E&M-EST. PATIENT-LVL III: ICD-10-PCS | Mod: PBBFAC,,, | Performed by: OPHTHALMOLOGY

## 2022-01-31 PROCEDURE — 1159F MED LIST DOCD IN RCRD: CPT | Mod: CPTII,,, | Performed by: OPHTHALMOLOGY

## 2022-01-31 PROCEDURE — 1160F PR REVIEW ALL MEDS BY PRESCRIBER/CLIN PHARMACIST DOCUMENTED: ICD-10-PCS | Mod: CPTII,,, | Performed by: OPHTHALMOLOGY

## 2022-01-31 PROCEDURE — 3066F PR DOCUMENTATION OF TREATMENT FOR NEPHROPATHY: ICD-10-PCS | Mod: CPTII,,, | Performed by: OPHTHALMOLOGY

## 2022-01-31 NOTE — PROGRESS NOTES
Subjective:       Patient ID: Isabela Read is a 26 y.o. female      No chief complaint on file.    History of Present Illness  HPI     DLS 01/26/2022 by Dr. Ancelmo Park MD     25 Y/o female is here for PO Type II DM w/ TN and TRD OD (involving the   macula)     No pain, vision is blurry/stable since last week.      +++tearing   No itching   No burning   No flashes   No floaters     Eye med: PF OD qid                  AT OD qd                  Cosopt bid                  Vigamox qid                      POHx:   S/p Repair of complex retinal detachment by pars plana vitrectomy,   membrane peel, endolaser, injection of 5000cs silicone oil, right eye    Last edited by Maximilian Montaño MD on 1/31/2022  8:42 PM. (History)        Imaging:    See report    Assessment/Plan:     1. Type 1 diabetes mellitus with right eye affected by proliferative retinopathy and traction retinal detachment involving macula  Retina attached and doing well  AC SO centrally with angle closure peripherally except sup  Inc IOP  Put pt face down for about an hour.  Nasal angle was more open after face down  Add Brim TID and Latanoprost QHS OD  Cont Cosopt BID, PF QID, Vig QID  Strict face down.  RTC tomorrow, sooner PRN    Discussed poss need for SOR    Seen with Dr Ivy of glaucoma service today who recommended above plan    Follow up in about 1 day (around 2/1/2022), or if symptoms worsen or fail to improve, for Post-op.

## 2022-02-01 ENCOUNTER — TELEPHONE (OUTPATIENT)
Dept: OPHTHALMOLOGY | Facility: CLINIC | Age: 27
End: 2022-02-01
Payer: MEDICARE

## 2022-02-01 ENCOUNTER — OFFICE VISIT (OUTPATIENT)
Dept: OPHTHALMOLOGY | Facility: CLINIC | Age: 27
End: 2022-02-01
Payer: MEDICARE

## 2022-02-01 ENCOUNTER — ANESTHESIA (OUTPATIENT)
Dept: SURGERY | Facility: HOSPITAL | Age: 27
End: 2022-02-01
Payer: MEDICARE

## 2022-02-01 ENCOUNTER — PATIENT MESSAGE (OUTPATIENT)
Dept: VASCULAR SURGERY | Facility: CLINIC | Age: 27
End: 2022-02-01
Payer: MEDICARE

## 2022-02-01 ENCOUNTER — ANESTHESIA EVENT (OUTPATIENT)
Dept: SURGERY | Facility: HOSPITAL | Age: 27
End: 2022-02-01
Payer: MEDICARE

## 2022-02-01 ENCOUNTER — HOSPITAL ENCOUNTER (OUTPATIENT)
Facility: HOSPITAL | Age: 27
Discharge: HOME | End: 2022-02-01
Attending: OPHTHALMOLOGY | Admitting: OPHTHALMOLOGY
Payer: MEDICARE

## 2022-02-01 VITALS
HEIGHT: 64 IN | SYSTOLIC BLOOD PRESSURE: 153 MMHG | WEIGHT: 139 LBS | TEMPERATURE: 98 F | BODY MASS INDEX: 23.73 KG/M2 | DIASTOLIC BLOOD PRESSURE: 81 MMHG | OXYGEN SATURATION: 100 % | RESPIRATION RATE: 21 BRPM | HEART RATE: 78 BPM

## 2022-02-01 DIAGNOSIS — N18.6 END STAGE RENAL FAILURE ON DIALYSIS: Primary | ICD-10-CM

## 2022-02-01 DIAGNOSIS — E10.3521 TYPE 1 DIABETES MELLITUS WITH PROLIFERATIVE DIABETIC RETINOPATHY WITH TRACTION RETINAL DETACHMENT INVOLVING THE MACULA, RIGHT EYE: Primary | ICD-10-CM

## 2022-02-01 DIAGNOSIS — Z99.2 END STAGE RENAL FAILURE ON DIALYSIS: Primary | ICD-10-CM

## 2022-02-01 DIAGNOSIS — E10.3521: ICD-10-CM

## 2022-02-01 DIAGNOSIS — H40.211 ACUTE ANGLE-CLOSURE GLAUCOMA OF RIGHT EYE: Primary | ICD-10-CM

## 2022-02-01 DIAGNOSIS — H40.9 GLAUCOMA (INCREASED EYE PRESSURE): ICD-10-CM

## 2022-02-01 DIAGNOSIS — E10.3521 TYPE 1 DIABETES MELLITUS WITH RIGHT EYE AFFECTED BY PROLIFERATIVE RETINOPATHY AND TRACTION RETINAL DETACHMENT INVOLVING MACULA: Primary | ICD-10-CM

## 2022-02-01 DIAGNOSIS — H40.31X3 GLAUCOMA OF RIGHT EYE ASSOCIATED WITH OCULAR TRAUMA, SEVERE STAGE: ICD-10-CM

## 2022-02-01 LAB
CTP QC/QA: YES
HCG INTACT+B SERPL-ACNC: <2.4 MIU/ML
POCT GLUCOSE: 108 MG/DL (ref 70–110)
SARS-COV-2 AG RESP QL IA.RAPID: NEGATIVE

## 2022-02-01 PROCEDURE — 36000706: Mod: TXP | Performed by: OPHTHALMOLOGY

## 2022-02-01 PROCEDURE — 67036 PR VITRECTOMY,MECHANICAL: ICD-10-PCS | Mod: 58,RT,NTX, | Performed by: OPHTHALMOLOGY

## 2022-02-01 PROCEDURE — 25000003 PHARM REV CODE 250: Mod: TXP | Performed by: NURSE ANESTHETIST, CERTIFIED REGISTERED

## 2022-02-01 PROCEDURE — 67036 REMOVAL OF INNER EYE FLUID: CPT | Mod: 58,RT,NTX, | Performed by: OPHTHALMOLOGY

## 2022-02-01 PROCEDURE — 99999 PR PBB SHADOW E&M-EST. PATIENT-LVL III: CPT | Mod: PBBFAC,,, | Performed by: OPHTHALMOLOGY

## 2022-02-01 PROCEDURE — D9220A PRA ANESTHESIA: Mod: ANES,NTX,, | Performed by: ANESTHESIOLOGY

## 2022-02-01 PROCEDURE — 37000008 HC ANESTHESIA 1ST 15 MINUTES: Mod: TXP | Performed by: OPHTHALMOLOGY

## 2022-02-01 PROCEDURE — 63600175 PHARM REV CODE 636 W HCPCS: Mod: TXP | Performed by: OPHTHALMOLOGY

## 2022-02-01 PROCEDURE — 99499 NO LOS: ICD-10-PCS | Mod: NTX,,, | Performed by: STUDENT IN AN ORGANIZED HEALTH CARE EDUCATION/TRAINING PROGRAM

## 2022-02-01 PROCEDURE — 84702 CHORIONIC GONADOTROPIN TEST: CPT | Mod: TXP | Performed by: ANESTHESIOLOGY

## 2022-02-01 PROCEDURE — 1159F MED LIST DOCD IN RCRD: CPT | Mod: CPTII,S$GLB,, | Performed by: OPHTHALMOLOGY

## 2022-02-01 PROCEDURE — 63600175 PHARM REV CODE 636 W HCPCS: Mod: TXP | Performed by: NURSE ANESTHETIST, CERTIFIED REGISTERED

## 2022-02-01 PROCEDURE — 63600175 PHARM REV CODE 636 W HCPCS: Mod: NTX | Performed by: NURSE ANESTHETIST, CERTIFIED REGISTERED

## 2022-02-01 PROCEDURE — 99024 PR POST-OP FOLLOW-UP VISIT: ICD-10-PCS | Mod: S$GLB,,, | Performed by: OPHTHALMOLOGY

## 2022-02-01 PROCEDURE — 1159F PR MEDICATION LIST DOCUMENTED IN MEDICAL RECORD: ICD-10-PCS | Mod: CPTII,S$GLB,, | Performed by: OPHTHALMOLOGY

## 2022-02-01 PROCEDURE — 25000003 PHARM REV CODE 250: Mod: TXP | Performed by: OPHTHALMOLOGY

## 2022-02-01 PROCEDURE — 1160F PR REVIEW ALL MEDS BY PRESCRIBER/CLIN PHARMACIST DOCUMENTED: ICD-10-PCS | Mod: CPTII,S$GLB,, | Performed by: OPHTHALMOLOGY

## 2022-02-01 PROCEDURE — 99024 POSTOP FOLLOW-UP VISIT: CPT | Mod: S$GLB,,, | Performed by: OPHTHALMOLOGY

## 2022-02-01 PROCEDURE — 36000707: Mod: TXP | Performed by: OPHTHALMOLOGY

## 2022-02-01 PROCEDURE — 99999 PR PBB SHADOW E&M-EST. PATIENT-LVL III: ICD-10-PCS | Mod: PBBFAC,,, | Performed by: OPHTHALMOLOGY

## 2022-02-01 PROCEDURE — 71000015 HC POSTOP RECOV 1ST HR: Mod: TXP | Performed by: OPHTHALMOLOGY

## 2022-02-01 PROCEDURE — 3066F NEPHROPATHY DOC TX: CPT | Mod: CPTII,S$GLB,, | Performed by: OPHTHALMOLOGY

## 2022-02-01 PROCEDURE — D9220A PRA ANESTHESIA: ICD-10-PCS | Mod: CRNA,NTX,, | Performed by: NURSE ANESTHETIST, CERTIFIED REGISTERED

## 2022-02-01 PROCEDURE — 1160F RVW MEDS BY RX/DR IN RCRD: CPT | Mod: CPTII,S$GLB,, | Performed by: OPHTHALMOLOGY

## 2022-02-01 PROCEDURE — 25000003 PHARM REV CODE 250: Mod: NTX | Performed by: OPHTHALMOLOGY

## 2022-02-01 PROCEDURE — D9220A PRA ANESTHESIA: Mod: CRNA,NTX,, | Performed by: NURSE ANESTHETIST, CERTIFIED REGISTERED

## 2022-02-01 PROCEDURE — 82962 GLUCOSE BLOOD TEST: CPT | Mod: TXP | Performed by: OPHTHALMOLOGY

## 2022-02-01 PROCEDURE — 3066F PR DOCUMENTATION OF TREATMENT FOR NEPHROPATHY: ICD-10-PCS | Mod: CPTII,S$GLB,, | Performed by: OPHTHALMOLOGY

## 2022-02-01 PROCEDURE — D9220A PRA ANESTHESIA: ICD-10-PCS | Mod: ANES,NTX,, | Performed by: ANESTHESIOLOGY

## 2022-02-01 PROCEDURE — 37000009 HC ANESTHESIA EA ADD 15 MINS: Mod: TXP | Performed by: OPHTHALMOLOGY

## 2022-02-01 PROCEDURE — 71000044 HC DOSC ROUTINE RECOVERY FIRST HOUR: Mod: TXP | Performed by: OPHTHALMOLOGY

## 2022-02-01 PROCEDURE — 99499 UNLISTED E&M SERVICE: CPT | Mod: NTX,,, | Performed by: STUDENT IN AN ORGANIZED HEALTH CARE EDUCATION/TRAINING PROGRAM

## 2022-02-01 RX ORDER — NEOMYCIN SULFATE, POLYMYXIN B SULFATE, AND DEXAMETHASONE 3.5; 10000; 1 MG/G; [USP'U]/G; MG/G
OINTMENT OPHTHALMIC
Status: DISCONTINUED
Start: 2022-02-01 | End: 2022-02-01 | Stop reason: HOSPADM

## 2022-02-01 RX ORDER — CISATRACURIUM BESYLATE 2 MG/ML
INJECTION, SOLUTION INTRAVENOUS
Status: COMPLETED
Start: 2022-02-01 | End: 2022-02-01

## 2022-02-01 RX ORDER — DEXAMETHASONE SODIUM PHOSPHATE 4 MG/ML
INJECTION, SOLUTION INTRA-ARTICULAR; INTRALESIONAL; INTRAMUSCULAR; INTRAVENOUS; SOFT TISSUE
Status: DISCONTINUED | OUTPATIENT
Start: 2022-02-01 | End: 2022-02-01 | Stop reason: HOSPADM

## 2022-02-01 RX ORDER — HYDROCODONE BITARTRATE AND ACETAMINOPHEN 5; 325 MG/1; MG/1
1 TABLET ORAL EVERY 6 HOURS PRN
Qty: 10 TABLET | Refills: 0 | Status: SHIPPED | OUTPATIENT
Start: 2022-02-01 | End: 2022-02-01 | Stop reason: CLARIF

## 2022-02-01 RX ORDER — HYDROCODONE BITARTRATE AND ACETAMINOPHEN 5; 325 MG/1; MG/1
1 TABLET ORAL EVERY 4 HOURS PRN
Status: DISCONTINUED | OUTPATIENT
Start: 2022-02-01 | End: 2022-02-01 | Stop reason: HOSPADM

## 2022-02-01 RX ORDER — DEXMEDETOMIDINE HYDROCHLORIDE 100 UG/ML
INJECTION, SOLUTION INTRAVENOUS
Status: DISCONTINUED | OUTPATIENT
Start: 2022-02-01 | End: 2022-02-01

## 2022-02-01 RX ORDER — SODIUM CHLORIDE 9 MG/ML
INJECTION, SOLUTION INTRAVENOUS CONTINUOUS PRN
Status: DISCONTINUED | OUTPATIENT
Start: 2022-02-01 | End: 2022-02-01

## 2022-02-01 RX ORDER — VANCOMYCIN HYDROCHLORIDE 500 MG/10ML
INJECTION, POWDER, LYOPHILIZED, FOR SOLUTION INTRAVENOUS
Status: DISCONTINUED | OUTPATIENT
Start: 2022-02-01 | End: 2022-02-01 | Stop reason: HOSPADM

## 2022-02-01 RX ORDER — INDOCYANINE GREEN AND WATER 25 MG
KIT INJECTION
Status: DISCONTINUED
Start: 2022-02-01 | End: 2022-02-01 | Stop reason: HOSPADM

## 2022-02-01 RX ORDER — TETRACAINE HYDROCHLORIDE 5 MG/ML
1 SOLUTION OPHTHALMIC
Status: DISCONTINUED | OUTPATIENT
Start: 2022-02-01 | End: 2022-02-01 | Stop reason: HOSPADM

## 2022-02-01 RX ORDER — FENTANYL CITRATE 50 UG/ML
25 INJECTION, SOLUTION INTRAMUSCULAR; INTRAVENOUS EVERY 5 MIN PRN
Status: DISCONTINUED | OUTPATIENT
Start: 2022-02-01 | End: 2022-02-01 | Stop reason: HOSPADM

## 2022-02-01 RX ORDER — CISATRACURIUM BESYLATE 2 MG/ML
INJECTION, SOLUTION INTRAVENOUS
Status: DISCONTINUED | OUTPATIENT
Start: 2022-02-01 | End: 2022-02-01

## 2022-02-01 RX ORDER — ACETAMINOPHEN 325 MG/1
650 TABLET ORAL EVERY 4 HOURS PRN
Status: DISCONTINUED | OUTPATIENT
Start: 2022-02-01 | End: 2022-02-01 | Stop reason: HOSPADM

## 2022-02-01 RX ORDER — LIDOCAINE HYDROCHLORIDE 20 MG/ML
INJECTION, SOLUTION EPIDURAL; INFILTRATION; INTRACAUDAL; PERINEURAL
Status: DISCONTINUED
Start: 2022-02-01 | End: 2022-02-01 | Stop reason: HOSPADM

## 2022-02-01 RX ORDER — ACETAMINOPHEN 325 MG/1
325 TABLET ORAL EVERY 6 HOURS PRN
Refills: 0 | Status: CANCELLED
Start: 2022-02-01

## 2022-02-01 RX ORDER — EPINEPHRINE 1 MG/ML
INJECTION, SOLUTION INTRACARDIAC; INTRAMUSCULAR; INTRAVENOUS; SUBCUTANEOUS
Status: DISCONTINUED
Start: 2022-02-01 | End: 2022-02-01 | Stop reason: HOSPADM

## 2022-02-01 RX ORDER — SODIUM CHLORIDE 0.9 % (FLUSH) 0.9 %
10 SYRINGE (ML) INJECTION
Status: DISCONTINUED | OUTPATIENT
Start: 2022-02-01 | End: 2022-02-01 | Stop reason: HOSPADM

## 2022-02-01 RX ORDER — DEXAMETHASONE SODIUM PHOSPHATE 4 MG/ML
INJECTION, SOLUTION INTRA-ARTICULAR; INTRALESIONAL; INTRAMUSCULAR; INTRAVENOUS; SOFT TISSUE
Status: DISCONTINUED
Start: 2022-02-01 | End: 2022-02-01 | Stop reason: HOSPADM

## 2022-02-01 RX ORDER — PHENYLEPHRINE HYDROCHLORIDE 10 MG/ML
INJECTION INTRAVENOUS
Status: DISCONTINUED | OUTPATIENT
Start: 2022-02-01 | End: 2022-02-01

## 2022-02-01 RX ORDER — MIDAZOLAM HYDROCHLORIDE 1 MG/ML
INJECTION INTRAMUSCULAR; INTRAVENOUS
Status: DISCONTINUED | OUTPATIENT
Start: 2022-02-01 | End: 2022-02-01

## 2022-02-01 RX ORDER — BUPIVACAINE HYDROCHLORIDE 7.5 MG/ML
INJECTION, SOLUTION EPIDURAL; RETROBULBAR
Status: DISCONTINUED
Start: 2022-02-01 | End: 2022-02-01 | Stop reason: HOSPADM

## 2022-02-01 RX ORDER — VANCOMYCIN HYDROCHLORIDE 500 MG/10ML
INJECTION, POWDER, LYOPHILIZED, FOR SOLUTION INTRAVENOUS
Status: DISCONTINUED
Start: 2022-02-01 | End: 2022-02-01 | Stop reason: HOSPADM

## 2022-02-01 RX ORDER — NEOSTIGMINE METHYLSULFATE 0.5 MG/ML
INJECTION, SOLUTION INTRAVENOUS
Status: DISCONTINUED | OUTPATIENT
Start: 2022-02-01 | End: 2022-02-01

## 2022-02-01 RX ORDER — LIDOCAINE HYDROCHLORIDE 10 MG/ML
1 INJECTION, SOLUTION EPIDURAL; INFILTRATION; INTRACAUDAL; PERINEURAL ONCE
Status: DISCONTINUED | OUTPATIENT
Start: 2022-02-01 | End: 2022-02-01 | Stop reason: HOSPADM

## 2022-02-01 RX ORDER — HYDROCODONE BITARTRATE AND ACETAMINOPHEN 5; 325 MG/1; MG/1
1 TABLET ORAL EVERY 6 HOURS PRN
Qty: 10 TABLET | Refills: 0 | Status: SHIPPED | OUTPATIENT
Start: 2022-02-01 | End: 2022-02-01 | Stop reason: SDUPTHER

## 2022-02-01 RX ORDER — HYDROCODONE BITARTRATE AND ACETAMINOPHEN 5; 325 MG/1; MG/1
1 TABLET ORAL EVERY 6 HOURS PRN
Qty: 10 TABLET | Refills: 0 | Status: SHIPPED | OUTPATIENT
Start: 2022-02-01 | End: 2022-03-21

## 2022-02-01 RX ORDER — EPINEPHRINE CONVENIENCE KIT 1 MG/ML(1)
KIT INTRAMUSCULAR; SUBCUTANEOUS
Status: DISCONTINUED | OUTPATIENT
Start: 2022-02-01 | End: 2022-02-01 | Stop reason: HOSPADM

## 2022-02-01 RX ORDER — PREDNISOLONE ACETATE 10 MG/ML
1 SUSPENSION/ DROPS OPHTHALMIC
Status: DISCONTINUED | OUTPATIENT
Start: 2022-02-01 | End: 2022-02-01 | Stop reason: HOSPADM

## 2022-02-01 RX ORDER — ONDANSETRON 2 MG/ML
INJECTION INTRAMUSCULAR; INTRAVENOUS
Status: DISCONTINUED | OUTPATIENT
Start: 2022-02-01 | End: 2022-02-01

## 2022-02-01 RX ORDER — MOXIFLOXACIN 5 MG/ML
1 SOLUTION/ DROPS OPHTHALMIC
Status: DISCONTINUED | OUTPATIENT
Start: 2022-02-01 | End: 2022-02-01 | Stop reason: HOSPADM

## 2022-02-01 RX ORDER — LIDOCAINE HCL/PF 100 MG/5ML
SYRINGE (ML) INTRAVENOUS
Status: DISCONTINUED | OUTPATIENT
Start: 2022-02-01 | End: 2022-02-01

## 2022-02-01 RX ORDER — FENTANYL CITRATE 50 UG/ML
INJECTION, SOLUTION INTRAMUSCULAR; INTRAVENOUS
Status: DISCONTINUED | OUTPATIENT
Start: 2022-02-01 | End: 2022-02-01

## 2022-02-01 RX ORDER — PROPOFOL 10 MG/ML
VIAL (ML) INTRAVENOUS
Status: DISCONTINUED | OUTPATIENT
Start: 2022-02-01 | End: 2022-02-01

## 2022-02-01 RX ADMIN — PREDNISOLONE ACETATE 1 DROP: 10 SUSPENSION OPHTHALMIC at 01:02

## 2022-02-01 RX ADMIN — MOXIFLOXACIN 1 DROP: 5 SOLUTION/ DROPS OPHTHALMIC at 01:02

## 2022-02-01 RX ADMIN — PHENYLEPHRINE HYDROCHLORIDE 100 MCG: 10 INJECTION INTRAVENOUS at 04:02

## 2022-02-01 RX ADMIN — CISATRACURIUM BESYLATE 6 MG: 2 INJECTION INTRAVENOUS at 03:02

## 2022-02-01 RX ADMIN — PHENYLEPHRINE HYDROCHLORIDE 100 MCG: 10 INJECTION INTRAVENOUS at 03:02

## 2022-02-01 RX ADMIN — FENTANYL CITRATE 50 MCG: 50 INJECTION, SOLUTION INTRAMUSCULAR; INTRAVENOUS at 03:02

## 2022-02-01 RX ADMIN — SODIUM CHLORIDE: 0.9 INJECTION, SOLUTION INTRAVENOUS at 02:02

## 2022-02-01 RX ADMIN — LIDOCAINE HYDROCHLORIDE 100 MG: 20 INJECTION, SOLUTION INTRAVENOUS at 03:02

## 2022-02-01 RX ADMIN — GLYCOPYRROLATE 0.4 MG: 0.2 INJECTION, SOLUTION INTRAMUSCULAR; INTRAVENOUS at 04:02

## 2022-02-01 RX ADMIN — NEOSTIGMINE METHYLSULFATE 4 MG: 0.5 INJECTION INTRAVENOUS at 04:02

## 2022-02-01 RX ADMIN — ONDANSETRON 4 MG: 2 INJECTION INTRAMUSCULAR; INTRAVENOUS at 04:02

## 2022-02-01 RX ADMIN — MIDAZOLAM HYDROCHLORIDE 2 MG: 1 INJECTION, SOLUTION INTRAMUSCULAR; INTRAVENOUS at 02:02

## 2022-02-01 RX ADMIN — DEXMEDETOMIDINE HYDROCHLORIDE 12 MCG: 100 INJECTION, SOLUTION, CONCENTRATE INTRAVENOUS at 03:02

## 2022-02-01 RX ADMIN — TETRACAINE HYDROCHLORIDE 1 DROP: 5 SOLUTION OPHTHALMIC at 01:02

## 2022-02-01 RX ADMIN — PROPOFOL 200 MG: 10 INJECTION, EMULSION INTRAVENOUS at 03:02

## 2022-02-01 NOTE — PROGRESS NOTES
Patient states she is voiding only once daily, she is not able to provide urine sample for pregnancy test. Dr. Anglin, anesthesia provider, notified. Ok to send HCG Quantitative test.

## 2022-02-01 NOTE — TRANSFER OF CARE
"Anesthesia Transfer of Care Note    Patient: Isabela Read    Procedure(s) Performed: Procedure(s) (LRB):  REMOVAL IMPLANT, POSTERIOR SEGMENT, INTRAOCULAR (Right)  VITRECTOMY, PARS PLANA APPROACH (Right)    Patient location: PACU    Anesthesia Type: general    Transport from OR: Transported from OR on 6-10 L/min O2 by face mask with adequate spontaneous ventilation    Post pain: adequate analgesia    Post assessment: no apparent anesthetic complications and tolerated procedure well    Post vital signs: stable    Level of consciousness: awake and alert    Nausea/Vomiting: no nausea/vomiting    Complications: none    Transfer of care protocol was followed      Last vitals:   Visit Vitals  BP (!) 194/119   Pulse 100   Temp 36.5 °C (97.7 °F) (Oral)   Resp 16   Ht 5' 4" (1.626 m)   Wt 63 kg (139 lb)   LMP 01/21/2022 (Approximate)   SpO2 97%   Breastfeeding No   BMI 23.86 kg/m²     " normal (ped)...

## 2022-02-01 NOTE — OP NOTE
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, type 1 diabetes with proliferative diabetic retinopathy and combined tractional and rhegmatogenous retinal detachment, right eye  Post Op Dx: Same  Procedure Performed: Pars plana vitrectomy, removal of silicone oil, injection of 14%C3F8 gas, right eye  Attending Surgeon: ARMIN Montaño  Assistant Surgeon: ARMIN Park  Anesthesia: General, peribulbar injection of 0.75% bupivicaine  Estimated blood loss: Minimal  Complication: None  Specimen: None  Disposition: Stable to recovery  Findings: None  Outcome: Retina attached, pressure normal  Date of Discharge: 02/01/2022  Discharge Disposition: Home  F/U: 02/02/22      Informed consent was obtained and placed in the medical record. The patient was properly identified and taken to the operating room. General anesthesia was begun by the anesthesia team.  The right eye was prepped and draped in the standard ophthalmic fashion taking care to exclude the lashes from the operative field.    Standard 23 gauge vitrectomy setup was achieved with all ports 4.0 mm posterior to the limbus. The [employer] visualization system was used. Silicone oil was removed using the viscous fluid extractor.  A superior paracentesis was created at the corneal limbus using a sideport blade.  BSS came forward from the posterior infusion and passively displaced the anterior chamber silicone oil which was evacuated through the paracentesis.  Three posterior fluid/air exchanges were performed using the soft tip cannula to ensure that all silicone oil had been removed from the eye.  The eye was filled with balanced salt solution again. The retina was inspected for 360 degrees to the ora nora using scleral depression. There were no breaks or detachments.  There were 360 degree chorioretinal laser burns.  Complete fluid/air exchange was performed. The air was exchanged for 14%C3F8 gas through the infusion cannula using a  chimney through one of the ports for exhaust.    The ports were removed. The infusion cannula was removed.  All wounds were sutured with 8-0 vicryl suture, checked, and noted not to be leaking. The eye was normotensive. A subconjunctival injection of vancomycin and dexamethasone was placed.     The eye was patched. A Little shield was placed. The patient was awakened from general anesthesia by the anesthesia team having tolerated the procedure well.

## 2022-02-01 NOTE — DISCHARGE SUMMARY
[facility] - [facility] (1st Fl)  Discharge Note  Short Stay    Date of Admission: 02/01/2022    Procedure(s) (LRB):  REMOVAL IMPLANT, POSTERIOR SEGMENT, INTRAOCULAR (Right)  VITRECTOMY, PARS PLANA APPROACH (Right)    OUTCOME: Patient tolerated treatment/procedure well without complication and is now ready for discharge.    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     TIME SPENT ON DISCHARGE: 15 minutes

## 2022-02-01 NOTE — DISCHARGE INSTRUCTIONS
Keep dressing in place until clinic visit  Sleep with head elevated, face down  No bending, stooping, straining or lifting anything heavy  May have Tylenol for discomfort/pain  Protect eye  Be careful walking, judging distances        Patient Education       Retinal Detachment Repair Discharge Instructions   About this topic   The retina is the light-sensitive part at the back of the eyes. Sometimes, the retina comes loose from the back of the eye. This is a detached retina and it can cause blindness. Retinal detachment repair is surgery done to put the retina back in its normal place to restore eyesight.     What care is needed at home?   · Ask your doctor what you need to do when you go home. Make sure you ask questions if you do not understand what the doctor says. This way you will know what you need to do.  · Your doctor will give you eye drops. Apply your drugs as instructed by your doctor. Wash your hands before touching your eyes.  · Talk to your doctor about how to care for your eyes. Ask your doctor about:  ? When you should change your bandages. Do not take the patch off of your eye until your doctor tells you to do so.  ? When you may take a bath, shower, or wash your face. Protect your eye from running water until your doctor allows you.  ? If you need to be careful with moving your head or lifting, pushing, or pulling things over 10 pounds (4.5 kg)  ? When you may go back to your normal activities like work or driving  · You can wear sunglasses when you go out.  · Do not touch your eyes. When your eye feels itchy, you may dampen a clean washcloth with cold water and gently wipe the itchy eye.  What follow-up care is needed?   Your doctor may ask you to make visits to the office to check on your progress. Be sure to keep these visits.  What drugs may be needed?   The doctor may order drugs to:  · Help with pain and swelling  · Prevent infection  · Relieve itching  Will physical activity be limited?    · Your doctor may ask you to hold your head in a certain position while your eye heals. You may have to lay on one side or facing down for 1 to 4 weeks after your surgery. Talk to your doctor about the best position for you. Ask for advice on tools to making laying in this position easier.  · Ask your doctor about what activities are OK for you. Find out if computer work, reading, TV, or air travel is allowed. Each of these things requires you to use your eyes in a different way.  · Avoid contact sports and strenuous activities. Ask your doctor about bending or stooping.  · Avoid straining during bowel movements.  · Avoid sneezing with the mouth closed.  · Rest as much as possible to keep the eye from moving.  What problems could happen?   · Bleeding  · Infection  · Increased pressure in the eye  · Detachment that may require more surgeries  · Loss of eyesight  When do I need to call the doctor?   · Signs of infection. These include a fever of 100.4°F (38°C) or higher, chills.  · Redness, drainage, discharge, or bleeding from your eye  · Very bad pain in your eye  · Flashes of light appear in your vision or you have a dark curtain or shadow moving across your vision.  Teach Back: Helping You Understand   The Teach Back Method helps you understand the information we are giving you. After you talk with the staff, tell them in your own words what you learned. This helps to make sure the staff has described each thing clearly. It also helps to explain things that may have been confusing. Before going home, make sure you can do these:  · I can tell you about my procedure.  · I can tell you how to care for my eye.  · I can tell you what changes I need to make with my drugs or activities.  · I can tell you what I will do if I have swelling, redness, drainage, pain, or a change in eyesight.  Where can I learn more?   American Academy of  Ophthalmology  http://www.geteyesmart.org/eyesmart/diseases/detached-torn-retina-treatment.cfm   NHS Choices  https://www.nhs.uk/conditions/detached-retina-retinal-detachment/   Last Reviewed Date   2020-08-24  Consumer Information Use and Disclaimer   This information is not specific medical advice and does not replace information you receive from your health care provider. This is only a brief summary of general information. It does NOT include all information about conditions, illnesses, injuries, tests, procedures, treatments, therapies, discharge instructions or life-style choices that may apply to you. You must talk with your health care provider for complete information about your health and treatment options. This information should not be used to decide whether or not to accept your health care providers advice, instructions or recommendations. Only your health care provider has the knowledge and training to provide advice that is right for you.  Copyright   Copyright © 2021 [employer] and its affiliates and/or licensors. All rights reserved.

## 2022-02-01 NOTE — ANESTHESIA PROCEDURE NOTES
Intubation    Date/Time: 2/1/2022 3:09 PM  Performed by: Meghan Thompson CRNA  Authorized by: Alis Anglin MD     Intubation:     Induction:  Intravenous    Intubated:  Postinduction    Mask Ventilation:  Easy mask    Attempts:  1    Attempted By:  CRNA    Method of Intubation:  Direct    Blade:  Yaquelin 3    Laryngeal View Grade: Grade I - full view of cords      Difficult Airway Encountered?: No      Complications:  None    Airway Device:  Oral endotracheal tube    Airway Device Size:  7.0    Style/Cuff Inflation:  Cuffed    Inflation Amount (mL):  5    Tube secured:  22    Secured at:  The lips    Placement Verified By:  Capnometry    Complicating Factors:  None    Findings Post-Intubation:  BS equal bilateral and atraumatic/condition of teeth unchanged

## 2022-02-01 NOTE — H&P
Pre-Operative History & Physical  Ophthalmology      SUBJECTIVE:     History of Present Illness:  Patient is a 26 y.o. 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    MEDICATIONS:   No medications prior to admission.       ALLERGIES: Review of patient's allergies indicates:  No Known Allergies    PAST MEDICAL HISTORY:   Past Medical History:   Diagnosis Date    Anemia     Chronic hypertension with exacerbation during pregnancy in second trimester 11/6/2020    Current regimen (11/6/20):  - Carvedilol 12.5 mg BID - Nifedipine 30 mg daily Baseline CKD + proteinuria    Chronic kidney disease     Depression     Diabetes mellitus     Diarrhea     Encounter for blood transfusion     Gastroparesis     Hepatomegaly 4/29/2021    Hx of psychiatric care     Hyperlipidemia     Hypertension     Nephrotic syndrome     Nephrotic syndrome 3/4/2021    Palpitations     Poor fetal growth affecting management of mother in second trimester 10/15/2020    Restrictive lung disease     Severe pre-eclampsia in second trimester 11/6/2020     PAST SURGICAL HISTORY:   Past Surgical History:   Procedure Laterality Date    AV FISTULA PLACEMENT Left 4/7/2021    Procedure: CREATION, AV FISTULA;  Surgeon: Roberto Ryan MD;  Location: [facility] OR [address] [place] [address];  Service: Peripheral Vascular;  Laterality: Left;    FISTULOGRAM N/A 8/11/2021    Procedure: Fistulogram;  Surgeon: Roberto Ryan MD;  Location: [facility] CATH LAB;  Service: Cardiology;  Laterality: N/A;    PERCUTANEOUS TRANSLUMINAL ANGIOPLASTY OF ARTERIOVENOUS FISTULA N/A 8/11/2021    Procedure: PTA, AV FISTULA;  Surgeon: Roberto Ryan MD;  Location: [facility] CATH LAB;  Service: Cardiology;  Laterality: N/A;    REPAIR OF RETINAL DETACHMENT WITH VITRECTOMY Right 1/25/2022    Procedure: REPAIR, RETINAL DETACHMENT, WITH VITRECTOMY, MEMBRANE PEEL, LASER,  INJECTION OF GAS VS OIL;  Surgeon: Maximilian Montaño MD;  Location: [facility] OR 1ST FLR;  Service: Ophthalmology;  Laterality: Right;    REVISION OF ARTERIOVENOUS FISTULA Left 12/10/2021    Procedure: REVISION, AV FISTULA with BVT;  Surgeon: Roberto Ryan MD;  Location: [facility] OR 2ND FLR;  Service: Peripheral Vascular;  Laterality: Left;     PAST FAMILY HISTORY:   Family History   Problem Relation Age of Onset    Hypertension Mother     Heart disease Father     Diabetes Brother     Celiac disease Neg Hx     Cirrhosis Neg Hx     Colon cancer Neg Hx     Colon polyps Neg Hx     Crohn's disease Neg Hx     Inflammatory bowel disease Neg Hx     Liver cancer Neg Hx     Liver disease Neg Hx     Rectal cancer Neg Hx     Stomach cancer Neg Hx     Ulcerative colitis Neg Hx     Esophageal cancer Neg Hx     Hemochromatosis Neg Hx     Pancreatic cancer Neg Hx     Kidney cancer Neg Hx     Bladder Cancer Neg Hx     Uterine cancer Neg Hx     Ovarian cancer Neg Hx      SOCIAL HISTORY:   Social History     Tobacco Use    Smoking status: Never Smoker    Smokeless tobacco: Never Used   Substance Use Topics    Alcohol use: No    Drug use: No        MENTAL STATUS: Alert    REVIEW OF SYSTEMS: Negative    OBJECTIVE:     Vital Signs (Most Recent)       Physical Exam:  General: NAD  HEENT: Atraumatic  Lungs: Adequate respirations, LCTAB  Heart: RRR, No murmur  Abdomen: Soft NT    ASSESSMENT/PLAN:     Patient is a 26 y.o. 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     - Plan for surgical correction of glaucoma by vitrectomy and removal of silicone oil, right eye   - Risks/benefits/alternatives of the procedure including, but not limited to scarring, bleeding, infection, loss or decreased vision, and/or need for possible repeat surgery discussed with the patient and family.   - Informed consent obtained prior  to surgery and the patient/family voiced good understanding.    Maximilian Montaño  2/1/2022  12:19 PM

## 2022-02-01 NOTE — ANESTHESIA PREPROCEDURE EVALUATION
02/01/2022  Isabela Read is a 26 y.o., female with HTN, DM, ESRD on HD last dialysis was this am. She was recently hospitalized for htn emergency and pulmonary edema also found to be in DKA. She reports feeling well since discharge. Here for below procedure    Pre-operative evaluation for Procedure(s) (LRB):  REPAIR, RETINAL DETACHMENT, WITH VITRECTOMY, MEMBRANE PEEL, LASER, INJECTION OF GAS VS OIL (Right)    Isabela Read is a 26 y.o. female     Patient Active Problem List   Diagnosis    Renovascular hypertension    Type 1 diabetes mellitus with end-stage renal disease (ESRD)    Ventricular bigeminy    Vitamin D deficiency    Mixed hyperlipidemia    Uremia    Anemia in pregnancy    Restrictive lung disease    Iron deficiency anemia    Anxiety    Decreased strength of lower extremity    Decreased range of motion of trunk and back    Hyperglycemia due to type 1 diabetes mellitus    Recurrent major depressive disorder, in partial remission    Metabolic acidosis    Anemia due to chronic kidney disease    Nephrotic syndrome    Anasarca    Secondary hyperparathyroidism    Hypoalbuminemia    Uncontrolled type 1 diabetes mellitus with hyperglycemia    Acute kidney injury superimposed on chronic kidney disease    Hepatomegaly    End stage renal failure on dialysis    Hypertension, essential    Organ transplant candidate    Iron overload    Arteriovenous fistula occlusion    ESRD (end stage renal disease)    Type 1 diabetes mellitus with ketoacidosis without coma    Anemia due to chronic kidney disease, on chronic dialysis       Review of patient's allergies indicates:  No Known Allergies    Current Facility-Administered Medications on File Prior to Visit   Medication Dose Route Frequency Provider Last Rate Last Admin    0.9%  NaCl infusion   Intravenous Continuous  Tavo Becker MD 25 mL/hr at 12/10/21 1043 New Bag at 12/10/21 1043    BUPivacaine (PF) 0.75% (7.5 mg/ml) (MARCAINE) 0.75 % (7.5 mg/mL) injection             chondroitin-sodium hyaluronate (VISCOAT) 4-3 % (40-30 mg/mL) intra-ocular injection             dexamethasone (DECADRON) 4 mg/mL injection             dextrose 50% injection             EPINEPHrine (ADRENALIN) 1 mg/mL (1 mL) injection             indocyanine green (IC-GREEN) 25 mg injection             LIDOcaine (PF) 10 mg/ml (1%) injection 10 mg  1 mL Intradermal Once Maximilian Montaño MD        LIDOcaine (PF) 20 mg/mL (2%) 20 mg/mL (2 %) injection             moxifloxacin 0.5 % ophthalmic solution 1 drop  1 drop Right Eye On Call Procedure Maximilian Montaño MD   1 drop at 02/01/22 1356    neomycin-polymyxin-dexamethasone (DEXACINE) 3.5 mg/g-10,000 unit/g-0.1 % ophthalmic ointment             prednisoLONE acetate 1 % ophthalmic suspension 1 drop  1 drop Right Eye On Call Procedure Maximilian Montaño MD   1 drop at 02/01/22 1355    sodium chloride 0.9% flush 10 mL  10 mL Intravenous PRN Maximilian Montaño MD        sodium hyaluronate 10 mg/mL ophthalmic injection             TETRAcaine HCl (PF) 0.5 % Drop 1 drop  1 drop Right Eye On Call Procedure Maximilian Montaño MD   1 drop at 02/01/22 1323    vancomycin (VANCOCIN) 500 mg injection              Current Outpatient Medications on File Prior to Visit   Medication Sig Dispense Refill    acetaminophen (TYLENOL) 325 MG tablet Take 1 tablet (325 mg total) by mouth every 6 (six) hours as needed for Pain.  0    aspirin (ECOTRIN) 81 MG EC tablet Take 1 tablet (81 mg total) by mouth every evening. 150 tablet 1    blood sugar diagnostic Strp To check BG 4 - 6 times daily, to use with insurance preferred meter 150 each 11    calcitRIOL (ROCALTROL) 0.5 MCG Cap Take 0.5 mcg by mouth once daily.      carvediloL (COREG) 25 MG tablet Take 1 tablet (25 mg total) by mouth 2 (two) times daily.  "60 tablet 11    escitalopram oxalate (LEXAPRO) 20 MG tablet Take 1 tablet (20 mg total) by mouth once daily. (Patient taking differently: Take 20 mg by mouth daily as needed (depression/anxiety).) 30 tablet 11    ferrous sulfate 325 (65 FE) MG EC tablet Take 1 tablet (325 mg total) by mouth 2 (two) times daily. 180 tablet 2    flash glucose scanning reader (FREESTYLE MARCY 14 DAY READER) Misc 1 each by Misc.(Non-Drug; Combo Route) route once daily. 1 each 3    flash glucose sensor (FREESTYLE MARCY 14 DAY SENSOR) Kit 6 kits by Misc.(Non-Drug; Combo Route) route once daily. 6 kit 3    hydrALAZINE (APRESOLINE) 25 MG tablet Take 1 tablet (25 mg total) by mouth 2 (two) times a day. 60 tablet 3    insulin detemir U-100 (LEVEMIR FLEXTOUCH) 100 unit/mL (3 mL) SubQ InPn pen 10 units subcutaneously in the morning and 12 units sq at night 15 mL 6    insulin lispro (HUMALOG KWIKPEN INSULIN) 100 unit/mL pen Inject 10 units into skin the skin 3 three times daily with meals 15 mL 6    lancets Misc To check BG 4 - 6 times daily, to use with insurance preferred meter 150 each 11    medroxyPROGESTERone (DEPO-PROVERA) 150 mg/mL Syrg Inject 1 mL (150 mg total) into the muscle once. for 1 dose 1 mL 3    NIFEdipine (PROCARDIA-XL) 60 MG (OSM) 24 hr tablet Take 1 tablet (60 mg total) by mouth 2 (two) times a day. 60 tablet 11    pen needle, diabetic 32 gauge x 5/32" Ndle For three times daily injection 100 each 11    rosuvastatin (CRESTOR) 20 MG tablet Take 1 tablet (20 mg total) by mouth every evening. 90 tablet 3    sevelamer carbonate (RENVELA) 800 mg Tab TAKE 1 TABLET BY MOUTH THREE TIMES DAILY WITH MEALS 90 tablet 0    traZODone (DESYREL) 50 MG tablet Take 1 tablet (50 mg total) by mouth nightly as needed for Insomnia. 30 tablet 1       Past Surgical History:   Procedure Laterality Date    AV FISTULA PLACEMENT Left 4/7/2021    Procedure: CREATION, AV FISTULA;  Surgeon: Roberto Ryan MD;  Location: [facility] OR [facility]" FLR;  Service: Peripheral Vascular;  Laterality: Left;    FISTULOGRAM N/A 8/11/2021    Procedure: Fistulogram;  Surgeon: Roberto Ryan MD;  Location: [facility] CATH LAB;  Service: Cardiology;  Laterality: N/A;    PERCUTANEOUS TRANSLUMINAL ANGIOPLASTY OF ARTERIOVENOUS FISTULA N/A 8/11/2021    Procedure: PTA, AV FISTULA;  Surgeon: Roberto Ryan MD;  Location: [facility] CATH LAB;  Service: Cardiology;  Laterality: N/A;    REPAIR OF RETINAL DETACHMENT WITH VITRECTOMY Right 1/25/2022    Procedure: REPAIR, RETINAL DETACHMENT, WITH VITRECTOMY, MEMBRANE PEEL, LASER, INJECTION OF GAS VS OIL;  Surgeon: Maximilian Montaño MD;  Location: [facility] OR 1ST FLR;  Service: Ophthalmology;  Laterality: Right;    REVISION OF ARTERIOVENOUS FISTULA Left 12/10/2021    Procedure: REVISION, AV FISTULA with BVT;  Surgeon: Roberto Ryan MD;  Location: [facility] OR 2ND FLR;  Service: Peripheral Vascular;  Laterality: Left;       Social History     Socioeconomic History    Marital status: Single   Occupational History    Occupation:  at [employer]   Tobacco Use    Smoking status: Never Smoker    Smokeless tobacco: Never Used   Substance and Sexual Activity    Alcohol use: No    Drug use: No    Sexual activity: Not Currently     Partners: Male     Comment: monogamous   Social History Narrative    Caregiver        Single    No kids    Had Miscarriage  At 23 weeks from preeclampsia    Disabled         2D Echo:  No results found for this or any previous visit.  Pre-op Assessment    I have reviewed the Patient Summary Reports.    I have reviewed the NPO Status.   I have reviewed the Medications.     Review of Systems  Anesthesia Hx:  No problems with previous Anesthesia  History of prior surgery of interest to airway management or planning:  Denies Personal Hx of Anesthesia complications.   Cardiovascular:   Hypertension, poorly controlled    Pulmonary:   Restrictive lung dis   Renal/:   Chronic Renal Disease, ESRD, Dialysis     Neurological:  Neurology Normal    Endocrine:   Diabetes, poorly controlled, type 1, using insulin  Diabetes        Physical Exam  General:  Well nourished    Airway/Jaw/Neck:  Airway Findings: Mouth Opening: Normal Tongue: Normal  General Airway Assessment: Adult  Improves to II with phonation.  TM Distance: Normal, at least 6 cm  Jaw/Neck Findings:  Neck ROM: Normal ROM      Dental:  Dental Findings: In tact        Mental Status:  Mental Status Findings:  Cooperative, Alert and Oriented         Anesthesia Plan  Type of Anesthesia, risks & benefits discussed:  Anesthesia Type:  general    Patient's Preference:   Plan Factors:          Intra-op Monitoring Plan: standard ASA monitors  Intra-op Monitoring Plan Comments:   Post Op Pain Control Plan: per primary service following discharge from PACU  Post Op Pain Control Plan Comments:     Induction:   IV  Beta Blocker:  Patient is not currently on a Beta-Blocker (No further documentation required).       Informed Consent: Patient understands risks and agrees with Anesthesia plan.  Questions answered. Anesthesia consent signed with patient.  ASA Score: 3     Day of Surgery Review of History & Physical: I have interviewed and examined the patient. I have reviewed the patient's H&P dated:    H&P update referred to the surgeon.         Ready For Surgery From Anesthesia Perspective.

## 2022-02-01 NOTE — BRIEF OP NOTE
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, type 1 diabetes with proliferative diabetic retinopathy and combined tractional and rhegmatogenous retinal detachment, right eye    Post Op Dx: Same    Procedure Performed: Pars plana vitrectomy, removal of silicone oil, injection of 14%C3F8 gas, right eye    Attending Surgeon: ARMIN Montaño    Assistant Surgeon: ARMIN Park    Anesthesia: General, peribulbar injection of 0.75% bupivicaine    Estimated blood loss: Minimal    Complication: None    Specimen: None    Disposition: Stable to recovery    Findings: None    Outcome: Retina attached, pressure normal    Date of Discharge: 02/01/2022    Discharge Disposition: Home    F/U: 02/02/22

## 2022-02-01 NOTE — PATIENT INSTRUCTIONS
Face down positioning as much as possible.    Right side down is next best position    DO NOT LIE FLAT ON BACK!!!  
no

## 2022-02-02 ENCOUNTER — SSC ENCOUNTER (OUTPATIENT)
Dept: ADMINISTRATIVE | Facility: OTHER | Age: 27
End: 2022-02-02
Payer: MEDICARE

## 2022-02-02 PROBLEM — H40.211 ACUTE ANGLE-CLOSURE GLAUCOMA OF RIGHT EYE: Status: ACTIVE | Noted: 2022-02-02

## 2022-02-02 PROBLEM — E10.3521: Status: ACTIVE | Noted: 2022-02-02

## 2022-02-02 NOTE — ANESTHESIA POSTPROCEDURE EVALUATION
Anesthesia Post Evaluation    Patient: Isabela Read    Procedure(s) Performed: Procedure(s) (LRB):  REMOVAL IMPLANT, POSTERIOR SEGMENT, INTRAOCULAR (Right)  VITRECTOMY, PARS PLANA APPROACH (Right)    Final Anesthesia Type: general      Patient location during evaluation: PACU  Patient participation: Yes- Able to Participate  Level of consciousness: awake and alert  Post-procedure vital signs: reviewed and stable  Pain management: adequate  Airway patency: patent    PONV status at discharge: No PONV  Anesthetic complications: no      Cardiovascular status: stable  Respiratory status: unassisted and spontaneous ventilation  Hydration status: euvolemic  Follow-up not needed.          Vitals Value Taken Time   /81 02/01/22 1831   Temp 36.7 °C (98 °F) 02/01/22 1709   Pulse 78 02/01/22 1837   Resp 22 02/01/22 1815   SpO2 100 % 02/01/22 1837   Vitals shown include unvalidated device data.      No case tracking events are documented in the log.      Pain/Renny Score: Renny Score: 9 (2/1/2022  5:45 PM)

## 2022-02-02 NOTE — PROGRESS NOTES
URGENT CARE NOTE    Chief Complaint   Patient presents with   • Abdominal Pain     Stomach pain sore throat swollen glands x5 days had a covid test done at [facility] last thursday and was negative    •  Symptoms     Noticed blood in urine last night and painful with urination       HPI: Charlotte Fields is a 12 year old female who comes in today accompanied by mother complaining of stomach pain, swollen cervical nodes that started 5 days ago. She had a negative Covid test on 3/28/21. Last night had hematuria and dysuria. She does have issues with stomach pain on and off, has gluten intolerance. She has been seen for stomach issues by her PCP who recommended cutting out lactose as well. No fevers. No cough or URI. Eating and drinking normally.     Current Outpatient Medications   Medication Sig Dispense Refill   • fluoxetine (PROzac) 20 MG tablet Take 20 mg by mouth daily.     • cefdinir (OMNICEF) 300 MG capsule Take 1 capsule by mouth 2 times daily for 7 days. 14 capsule 0     No current facility-administered medications for this visit.      ALLERGIES:   Allergen Reactions   • Gluten Meal   (Food Or Med) Nausea & Vomiting     Note: Patient has celiac disease. Gluten is entered as an allergy rather than an intolerance so that Nutrition Services and Pharmacy can review foods and medications for gluten content in event that patient is admitted.       Past Medical History:   Diagnosis Date   • Celiac disease      History reviewed. No pertinent family history.  Social History     Socioeconomic History   • Marital status: Single     Spouse name: Not on file   • Number of children: Not on file   • Years of education: Not on file   • Highest education level: Not on file   Occupational History   • Not on file   Social Needs   • Financial resource strain: Not on file   • Food insecurity     Worry: Not on file     Inability: Not on file   • Transportation needs     Medical: Not on  Subjective:       Patient ID: Isabela Read is a 26 y.o. female      Chief Complaint   Patient presents with    Retina     History of Present Illness  HPI     DLS 01/31/2022    C/o discomfort OD ( pain scale 1) other wise doing well. Using post   operative drops as directed.    Eye med: PF OD qid                  Brim tid OD                  Cosopt bid OD                  Vigamox qid OD                  Latanoprost QHS OD                      POHx:   S/p Repair of complex retinal detachment by pars plana vitrectomy,   membrane peel, endolaser, injection of 5000cs silicone oil, right eye    Last edited by Maximilian Montaño MD on 2/1/2022 11:42 AM. (History)        Imaging:    See report    Assessment/Plan:     1. 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 Using post   operative drops as directed.    Eye med: PF OD qid                  Brim tid OD                  Cosopt bid OD                  Vigamox qid OD                  Latanoprost QHS OD                      POHx:   S/p Repair of complex retinal detachment by pars plana vitrectomy,   membrane peel, endolaser, injection of 5000cs silicone oil, right eye    Last edited by Maximilian Montaño MD on 2/1/2022 11:42 AM. (History)            Assessment /Plan     For exam results, see Encounter Report.    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, severe stage               Spent last PM Face down with little effect to displace SO / Aqueous malfunction    IOP too high  file     Non-medical: Not on file   Tobacco Use   • Smoking status: Never Smoker   • Smokeless tobacco: Never Used   Substance and Sexual Activity   • Alcohol use: Not on file   • Drug use: Not on file   • Sexual activity: Not on file   Lifestyle   • Physical activity     Days per week: Not on file     Minutes per session: Not on file   • Stress: Not on file   Relationships   • Social connections     Talks on phone: Not on file     Gets together: Not on file     Attends Christian service: Not on file     Active member of club or organization: Not on file     Attends meetings of clubs or organizations: Not on file     Relationship status: Not on file   • Intimate partner violence     Fear of current or ex partner: Not on file     Emotionally abused: Not on file     Physically abused: Not on file     Forced sexual activity: Not on file   Other Topics Concern   • Not on file   Social History Narrative   • Not on file     Social History     Tobacco Use   Smoking Status Never Smoker   Smokeless Tobacco Never Used       ROS:   Pertinent positives as noted in HPI.    PHYSICAL EXAMINATION:  Visit Vitals  /62 (BP Location: RUE - Right upper extremity, Patient Position: Sitting)   Pulse 96   Temp 98.4 °F (36.9 °C) (Temporal)   Resp 18   Wt 54.6 kg   SpO2 98%       Constitutional:  Well developed, well nourished, no acute distress, non-toxic appearance   Eyes: conjunctiva normal   HENT:  Atraumatic, normocephalic, external ears normal, external nose is normal, oropharynx moist, no pharyngeal exudates. Tonsils are 2+.  No tonsillar injection or exudate. Uvula is midline, no trismus.  Bilateral TMs pearly grey with intact landmark and light reflex. Canals are clear.   Neck- normal range of motion, no tenderness, supple   Respiratory:  No respiratory distress, normal breath sounds, no rales, no wheezing   Cardiovascular:  Normal rate, normal rhythm, no murmurs, no gallops, no rubs   GI:  Soft, skin overlying normal,  for health of eye & ONH as discussed with patient    Seen and discussed with  Patient, mother and Dr Montaño    Plan for SO removal & possible lensectomy                          nondistended, normal bowel sounds, nontender with palpation.  :  Minimal left costovertebral angle tenderness   Integument:  Well  hydrated, warm and dry, no skin lesions or rash noted  Lymphatic: tonsillar lymphadenopathy noted   Neurologic:  Alert & appropriate       LABS:  Results for orders placed or performed in visit on 03/23/21   URINALYSIS WITH MICROSCOPY & CULTURE IF INDICATED   Result Value    COLOR, URINALYSIS Yellow    APPEARANCE, URINALYSIS Hazy    GLUCOSE, URINALYSIS Negative    BILIRUBIN, URINALYSIS Negative    KETONES, URINALYSIS 15   (A)    SPECIFIC GRAVITY, URINALYSIS >1.030 (H)    OCCULT BLOOD, URINALYSIS Large (A)    PH, URINALYSIS 5.0    PROTEIN, URINALYSIS >300 (A)    UROBILINOGEN, URINALYSIS 0.2    NITRITE, URINALYSIS Negative    LEUKOCYTE ESTERASE, URINALYSIS Trace (A)    SQUAMOUS EPITHELIAL, URINALYSIS 1 to 5    ERYTHROCYTES, URINALYSIS >100 (A)    LEUKOCYTES, URINALYSIS 26 to 100 (A)    BACTERIA, URINALYSIS Moderate (A)    HYALINE CASTS, URINALYSIS None Seen   GROUP A STREP THROAT PCR    Specimen: Throat; Swab   Result Value    STREPTOCOCCUS GROUP A PCR Not Detected     Comment: This result was obtained by RT-PCR amplification followed by fluorescence detection. This test has been cleared by the U.S. Food and Drug Administration for detection of Strep A.         ASSESSMENT:  1. Dysuria    2. Sore throat           PLAN:  Orders Placed This Encounter   • Urinalysis With Microscopy & Culture If Indicated   • Streptococcus group A PCR   • Urine, Bacterial Culture   • cefdinir (OMNICEF) 300 MG capsule     Negative strep. Urine is positive for infection. Will treat with Omnicef.   Lots of fluids. Take the antibiotic as directed. If you develop worsening pain, fevers, vomiting, flank pain, or any other concerning symptoms you should be seen at that time. Otherwise follow up with primary care provider.    Patient voices understanding and agreement with treatment plan. No questions at this time  all questions were answered during the course of visit. Follow-up primary care as needed.  The patient understands that this is a provisional diagnosis and that the findings from today are a \"picture in time\" of their disease process. If the patient has questions at any time about their diagnosis, disease process, progression, or lack of therapeutic response, they are encouraged to call their primary care provider or the emergency department for further direction. New or changing symptoms often require prompt followup and re-evaluation.    Return for PRN for lasting or worsening symptoms.        Dr. Thor Baxter, DO is my collaborating physician.

## 2022-02-02 NOTE — PROGRESS NOTES
Please note the following patient's information was forwarded to [facility] ([facility]) for case management and/or .    Please contact Ext. [phone] with any questions.    Thank you,    Corina Wells, [facility]  [phone]

## 2022-02-03 ENCOUNTER — PATIENT MESSAGE (OUTPATIENT)
Dept: ENDOSCOPY | Facility: HOSPITAL | Age: 27
End: 2022-02-03
Payer: MEDICARE

## 2022-02-03 ENCOUNTER — PATIENT MESSAGE (OUTPATIENT)
Dept: OPHTHALMOLOGY | Facility: CLINIC | Age: 27
End: 2022-02-03
Payer: MEDICARE

## 2022-02-03 ENCOUNTER — TELEPHONE (OUTPATIENT)
Dept: TRANSPLANT | Facility: CLINIC | Age: 27
End: 2022-02-03
Payer: MEDICARE

## 2022-02-03 DIAGNOSIS — Z99.2 ESRD NEEDING DIALYSIS: Primary | ICD-10-CM

## 2022-02-03 DIAGNOSIS — Z01.818 PRE-OP TESTING: ICD-10-CM

## 2022-02-03 DIAGNOSIS — Z76.82 ORGAN TRANSPLANT CANDIDATE: Primary | ICD-10-CM

## 2022-02-03 DIAGNOSIS — N18.6 ESRD NEEDING DIALYSIS: Primary | ICD-10-CM

## 2022-02-03 NOTE — TELEPHONE ENCOUNTER
Chart reviewed: patient is due for GI clearance (colonoscopy & EGD), Cards consult to rule out amyloid per pulmonary recs.   Patient still pending clearance clearance from Dr. Ziegler, in-basket message re-sent.   Patient instructed to get her colon and EDG rescheduled. Patient need emergency eye surgery and had to cancel.   Patient voiced understanding.

## 2022-02-04 ENCOUNTER — TELEPHONE (OUTPATIENT)
Dept: TRANSPLANT | Facility: CLINIC | Age: 27
End: 2022-02-04
Payer: MEDICARE

## 2022-02-04 ENCOUNTER — TELEPHONE (OUTPATIENT)
Dept: OPHTHALMOLOGY | Facility: CLINIC | Age: 27
End: 2022-02-04
Payer: MEDICARE

## 2022-02-04 NOTE — TELEPHONE ENCOUNTER
----- Message from Karin Ziegler MD sent at 2/3/2022  5:32 PM CST -----  Regarding: RE: Abnormal liver elastrography, possible iron overload  There are no abnormal findings from work up done by us. Hemochromatosis work up negative.   ----- Message -----  From: Leah Reyna  Sent: 2/3/2022   3:06 PM CST  To: Karin Ziegler MD  Subject: FW: Abnormal liver elastrography, possible i#    Hi Dr. Ziegler,  Can you review patient's requested labs and give final recs for kidney/pancreas transplantation?  Leah     ----- Message -----  From: Leah Reyna  Sent: 12/8/2021   2:14 PM CST  To: Karin Ziegler MD  Subject: FW: Abnormal liver elastrography, possible i#    Hi Dr. Ziegler,  Can you review patient's requested labs and give final recs for kidney/pancreas transplantation?  Leah   ----- Message -----  From: Karin iZegler MD  Sent: 9/29/2021   4:15 PM CST  To: Leah Reyna  Subject: RE: Abnormal liver elastrography, possible i#    It's in. Thanks  ----- Message -----  From: Leah Reyna  Sent: 9/29/2021  12:15 PM CDT  To: Karin Ziegler MD  Subject: RE: Abnormal liver elastrography, possible i#    Dr. Ziegler,  There's no orders for the labs you requested. There's a note where this patient contact your department to get schedule but it wasn't done. If you place the orders, I can help get the labs scheduled for you.  Leah  ----- Message -----  From: Karin Ziegler MD  Sent: 9/29/2021  10:21 AM CDT  To: Leah Reyna, Remedios Nava MD  Subject: RE: Abnormal liver elastrography, possible i#    She never had the labs I had recommended. But clearly it was iatrogenic--she was on oral and at some point, iv iron.   ----- Message -----  From: Remedios Nava MD  Sent: 9/29/2021  10:04 AM CDT  To: Karin Sherman MD  Subject: RE: Abnormal liver elastrography, possible i#    From the hepatology perspective:   Normal liver tests  No fibrosis on MRE  Iron saturaiton and ferritin not elevated  No  contraindication to kidney-pancrease tx from liver perspective  ----- Message -----  From: Leah Reyna  Sent: 9/29/2021   8:28 AM CDT  To: Remedios Nava MD, Karin Ziegler MD  Subject: Abnormal liver elastrography, possible iron #    Dr. Ziegler,  This patient is being considered for kidney/pancreas transplant. Per hepatology, they want you to comment on her iron overload secondary to hemochromatosis on elastrography with normal iron studies.   She was seen by you on 6/24/21 and your plans were for her to repeat iron studies today, HFE panel,. LDH, G6PD, serum copper and serum zinc checked.  Can you comment on her transplant candidacy?    Leah

## 2022-02-04 NOTE — TELEPHONE ENCOUNTER
Isabela Read Joseph D., MD Yesterday (1:15 PM)           Hello tomorrow i was scheduled to have some dental work done, Is it okay to proceed with my appointment, i know certain position he didnt want me in             Pt has been cleared to have her dental work done. It will not interfere with eye procedure. stj 4:27p

## 2022-02-10 ENCOUNTER — OFFICE VISIT (OUTPATIENT)
Dept: OPHTHALMOLOGY | Facility: CLINIC | Age: 27
End: 2022-02-10
Payer: MEDICARE

## 2022-02-10 DIAGNOSIS — E10.3521 TYPE 1 DIABETES MELLITUS WITH RIGHT EYE AFFECTED BY PROLIFERATIVE RETINOPATHY AND TRACTION RETINAL DETACHMENT INVOLVING MACULA: Primary | ICD-10-CM

## 2022-02-10 PROCEDURE — 1159F PR MEDICATION LIST DOCUMENTED IN MEDICAL RECORD: ICD-10-PCS | Mod: CPTII,S$GLB,, | Performed by: OPHTHALMOLOGY

## 2022-02-10 PROCEDURE — 1160F PR REVIEW ALL MEDS BY PRESCRIBER/CLIN PHARMACIST DOCUMENTED: ICD-10-PCS | Mod: CPTII,S$GLB,, | Performed by: OPHTHALMOLOGY

## 2022-02-10 PROCEDURE — 99024 POSTOP FOLLOW-UP VISIT: CPT | Mod: S$GLB,,, | Performed by: OPHTHALMOLOGY

## 2022-02-10 PROCEDURE — 1159F MED LIST DOCD IN RCRD: CPT | Mod: CPTII,S$GLB,, | Performed by: OPHTHALMOLOGY

## 2022-02-10 PROCEDURE — 99999 PR PBB SHADOW E&M-EST. PATIENT-LVL IV: ICD-10-PCS | Mod: PBBFAC,,, | Performed by: OPHTHALMOLOGY

## 2022-02-10 PROCEDURE — 99024 PR POST-OP FOLLOW-UP VISIT: ICD-10-PCS | Mod: S$GLB,,, | Performed by: OPHTHALMOLOGY

## 2022-02-10 PROCEDURE — 3066F PR DOCUMENTATION OF TREATMENT FOR NEPHROPATHY: ICD-10-PCS | Mod: CPTII,S$GLB,, | Performed by: OPHTHALMOLOGY

## 2022-02-10 PROCEDURE — 3066F NEPHROPATHY DOC TX: CPT | Mod: CPTII,S$GLB,, | Performed by: OPHTHALMOLOGY

## 2022-02-10 PROCEDURE — 1160F RVW MEDS BY RX/DR IN RCRD: CPT | Mod: CPTII,S$GLB,, | Performed by: OPHTHALMOLOGY

## 2022-02-10 PROCEDURE — 99214 OFFICE O/P EST MOD 30 MIN: CPT | Mod: PBBFAC | Performed by: OPHTHALMOLOGY

## 2022-02-10 PROCEDURE — 99999 PR PBB SHADOW E&M-EST. PATIENT-LVL IV: CPT | Mod: PBBFAC,,, | Performed by: OPHTHALMOLOGY

## 2022-02-11 ENCOUNTER — TELEPHONE (OUTPATIENT)
Dept: INTERNAL MEDICINE | Facility: CLINIC | Age: 27
End: 2022-02-11

## 2022-02-11 RX ORDER — ESCITALOPRAM OXALATE 20 MG/1
20 TABLET ORAL DAILY
Qty: 30 TABLET | Refills: 11 | Status: CANCELLED | OUTPATIENT
Start: 2022-02-11 | End: 2023-02-11

## 2022-02-11 NOTE — PROGRESS NOTES
Subjective:       Patient ID: Isabela Read is a 27 y.o. female      Chief Complaint   Patient presents with    Post-op Evaluation     History of Present Illness  HPI     Sees full but very blurry  No pain    Has been positioning face forward in day, right side down positioning when   lying or sleeping  Eye med: PF OD q2h                  Vigamox qid OD                    Atropine OD bid                                                      -eye pain  ++blurred va  --flashes/floaters  -headaches    POHx:   1. Type I DM OD w/ PDR and TRD   Pre-Op Dx: Glaucoma due to complication of silicone oil, type 1 diabetes   with proliferative diabetic retinopathy and combined tractional and   rhegmatogenous retinal detachment, right eye   Procedure Performed: Pars plana vitrectomy, removal of silicone oil,   injection of 14%C3F8 gas, right eye   Attending Surgeon: ARMIN Montaño (Date of Procedure: 02/01/2022 +       Last edited by Maximilian Montaño MD on 2/10/2022 10:00 PM. (History)        Imaging:    See report    Assessment/Plan:     1. 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  Little shield sleeping  Little shield, glasses, or sunglasses all times for protection   No shower   Face forward in day, right side down positioning when lying or sleeping  RD/Endophthalmitis precautions   RTC 1 wk sooner PRN if any problems (laure pain, redness, dimming or loss of vision)      Follow up in about 1 week (around 2/17/2022), or if symptoms worsen or fail to improve, for Post-op.

## 2022-02-12 ENCOUNTER — TELEPHONE (OUTPATIENT)
Dept: INTERNAL MEDICINE | Facility: CLINIC | Age: 27
End: 2022-02-12
Payer: MEDICARE

## 2022-02-14 ENCOUNTER — PATIENT MESSAGE (OUTPATIENT)
Dept: PULMONOLOGY | Facility: CLINIC | Age: 27
End: 2022-02-14
Payer: MEDICARE

## 2022-02-15 ENCOUNTER — TELEPHONE (OUTPATIENT)
Dept: TRANSPLANT | Facility: CLINIC | Age: 27
End: 2022-02-15
Payer: MEDICARE

## 2022-02-15 ENCOUNTER — PATIENT MESSAGE (OUTPATIENT)
Dept: VASCULAR SURGERY | Facility: CLINIC | Age: 27
End: 2022-02-15
Payer: MEDICARE

## 2022-02-17 ENCOUNTER — OFFICE VISIT (OUTPATIENT)
Dept: OPHTHALMOLOGY | Facility: CLINIC | Age: 27
End: 2022-02-17
Payer: MEDICARE

## 2022-02-17 DIAGNOSIS — E10.3521 TYPE 1 DIABETES MELLITUS WITH RIGHT EYE AFFECTED BY PROLIFERATIVE RETINOPATHY AND TRACTION RETINAL DETACHMENT INVOLVING MACULA: Primary | ICD-10-CM

## 2022-02-17 PROCEDURE — 99024 POSTOP FOLLOW-UP VISIT: CPT | Mod: S$GLB,,, | Performed by: OPHTHALMOLOGY

## 2022-02-17 PROCEDURE — 99024 PR POST-OP FOLLOW-UP VISIT: ICD-10-PCS | Mod: S$GLB,,, | Performed by: OPHTHALMOLOGY

## 2022-02-17 PROCEDURE — 3066F NEPHROPATHY DOC TX: CPT | Mod: CPTII,S$GLB,, | Performed by: OPHTHALMOLOGY

## 2022-02-17 PROCEDURE — 1160F RVW MEDS BY RX/DR IN RCRD: CPT | Mod: CPTII,S$GLB,, | Performed by: OPHTHALMOLOGY

## 2022-02-17 PROCEDURE — 99999 PR PBB SHADOW E&M-EST. PATIENT-LVL III: ICD-10-PCS | Mod: PBBFAC,,, | Performed by: OPHTHALMOLOGY

## 2022-02-17 PROCEDURE — 1159F MED LIST DOCD IN RCRD: CPT | Mod: CPTII,S$GLB,, | Performed by: OPHTHALMOLOGY

## 2022-02-17 PROCEDURE — 3066F PR DOCUMENTATION OF TREATMENT FOR NEPHROPATHY: ICD-10-PCS | Mod: CPTII,S$GLB,, | Performed by: OPHTHALMOLOGY

## 2022-02-17 PROCEDURE — 1160F PR REVIEW ALL MEDS BY PRESCRIBER/CLIN PHARMACIST DOCUMENTED: ICD-10-PCS | Mod: CPTII,S$GLB,, | Performed by: OPHTHALMOLOGY

## 2022-02-17 PROCEDURE — 1159F PR MEDICATION LIST DOCUMENTED IN MEDICAL RECORD: ICD-10-PCS | Mod: CPTII,S$GLB,, | Performed by: OPHTHALMOLOGY

## 2022-02-17 PROCEDURE — 99999 PR PBB SHADOW E&M-EST. PATIENT-LVL III: CPT | Mod: PBBFAC,,, | Performed by: OPHTHALMOLOGY

## 2022-02-21 NOTE — PROGRESS NOTES
Subjective:       Patient ID: Isabela Read is a 27 y.o. female      Chief Complaint   Patient presents with    Post-op Evaluation     History of Present Illness  HPI     PO    Pt states her vision has not changed since last visit. Blurry va OD. No   new symptoms    Gtts: PF OD QID          Atropine OD Qday      Last edited by Syeda Huerta on 2/17/2022 12:12 PM. (History)        Imaging:    See report    Assessment/Plan:     1. 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

## 2022-02-28 DIAGNOSIS — E10.22 TYPE 1 DIABETES MELLITUS WITH STAGE 5 CHRONIC KIDNEY DISEASE NOT ON CHRONIC DIALYSIS: ICD-10-CM

## 2022-02-28 DIAGNOSIS — N18.5 TYPE 1 DIABETES MELLITUS WITH STAGE 5 CHRONIC KIDNEY DISEASE NOT ON CHRONIC DIALYSIS: ICD-10-CM

## 2022-02-28 RX ORDER — FLASH GLUCOSE SENSOR
6 KIT MISCELLANEOUS DAILY
Qty: 6 KIT | Refills: 3 | Status: SHIPPED | OUTPATIENT
Start: 2022-02-28 | End: 2022-03-07 | Stop reason: SDUPTHER

## 2022-02-28 NOTE — TELEPHONE ENCOUNTER
----- Message from Rashida Chaudhry sent at 2/28/2022  3:18 PM CST -----  Contact: Lary Barth with [employer] calling in regards to Rx flash glucose sensor (FREESTYLE MARCY 14 DAY SENSOR) Kit. Opt is requesting a new version .       Confirmed patient's contact info below:  Contact Name: Lary  Phone Number: [phone] fax

## 2022-03-03 ENCOUNTER — OFFICE VISIT (OUTPATIENT)
Dept: OPHTHALMOLOGY | Facility: CLINIC | Age: 27
End: 2022-03-03
Payer: MEDICARE

## 2022-03-03 DIAGNOSIS — E10.3521 TYPE 1 DIABETES MELLITUS WITH RIGHT EYE AFFECTED BY PROLIFERATIVE RETINOPATHY AND TRACTION RETINAL DETACHMENT INVOLVING MACULA: Primary | ICD-10-CM

## 2022-03-03 PROCEDURE — 1159F MED LIST DOCD IN RCRD: CPT | Mod: CPTII,S$GLB,, | Performed by: OPHTHALMOLOGY

## 2022-03-03 PROCEDURE — 92134 POSTERIOR SEGMENT OCT RETINA (OCULAR COHERENCE TOMOGRAPHY)-BOTH EYES: ICD-10-PCS | Mod: S$GLB,,, | Performed by: OPHTHALMOLOGY

## 2022-03-03 PROCEDURE — 99999 PR PBB SHADOW E&M-EST. PATIENT-LVL III: ICD-10-PCS | Mod: PBBFAC,,, | Performed by: OPHTHALMOLOGY

## 2022-03-03 PROCEDURE — 99024 POSTOP FOLLOW-UP VISIT: CPT | Mod: S$GLB,,, | Performed by: OPHTHALMOLOGY

## 2022-03-03 PROCEDURE — 3066F NEPHROPATHY DOC TX: CPT | Mod: CPTII,S$GLB,, | Performed by: OPHTHALMOLOGY

## 2022-03-03 PROCEDURE — 92134 CPTRZ OPH DX IMG PST SGM RTA: CPT | Mod: S$GLB,,, | Performed by: OPHTHALMOLOGY

## 2022-03-03 PROCEDURE — 99024 PR POST-OP FOLLOW-UP VISIT: ICD-10-PCS | Mod: S$GLB,,, | Performed by: OPHTHALMOLOGY

## 2022-03-03 PROCEDURE — 1160F RVW MEDS BY RX/DR IN RCRD: CPT | Mod: CPTII,S$GLB,, | Performed by: OPHTHALMOLOGY

## 2022-03-03 PROCEDURE — 1159F PR MEDICATION LIST DOCUMENTED IN MEDICAL RECORD: ICD-10-PCS | Mod: CPTII,S$GLB,, | Performed by: OPHTHALMOLOGY

## 2022-03-03 PROCEDURE — 3066F PR DOCUMENTATION OF TREATMENT FOR NEPHROPATHY: ICD-10-PCS | Mod: CPTII,S$GLB,, | Performed by: OPHTHALMOLOGY

## 2022-03-03 PROCEDURE — 99999 PR PBB SHADOW E&M-EST. PATIENT-LVL III: CPT | Mod: PBBFAC,,, | Performed by: OPHTHALMOLOGY

## 2022-03-03 PROCEDURE — 1160F PR REVIEW ALL MEDS BY PRESCRIBER/CLIN PHARMACIST DOCUMENTED: ICD-10-PCS | Mod: CPTII,S$GLB,, | Performed by: OPHTHALMOLOGY

## 2022-03-04 ENCOUNTER — PATIENT MESSAGE (OUTPATIENT)
Dept: OPHTHALMOLOGY | Facility: CLINIC | Age: 27
End: 2022-03-04
Payer: MEDICARE

## 2022-03-04 NOTE — PROGRESS NOTES
Subjective:       Patient ID: Isabela Read is a 27 y.o. female      Chief Complaint   Patient presents with    Post-op Evaluation     History of Present Illness  HPI     DLS:  2/17/22    S/p Pars plana vitrectomy, removal of silicone oil, injection of 14%C3F8   gas, right eye 2/1/22    Pt here for 2 week post op OD.  Pt denies eye pain OD.  Feels that vision   has improved OD.  Bubble is gone.  Vision is blurry.  Pt denies flashes or   floaters.     Gtts: PF OD QID          Atropine OD Qday      Last edited by Maximilian Montaño MD on 3/4/2022  2:59 PM. (History)        Imaging:    See report    Assessment/Plan:     1. 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  Discussed poss need for more surgery  - Posterior Segment OCT Retina-Both eyes    Follow up in about 1 week (around 3/10/2022), or if symptoms worsen or fail to improve, for Dilated examination, OCT Mac.

## 2022-03-07 ENCOUNTER — PATIENT OUTREACH (OUTPATIENT)
Dept: ADMINISTRATIVE | Facility: OTHER | Age: 27
End: 2022-03-07
Payer: MEDICARE

## 2022-03-07 DIAGNOSIS — N18.5 TYPE 1 DIABETES MELLITUS WITH STAGE 5 CHRONIC KIDNEY DISEASE NOT ON CHRONIC DIALYSIS: ICD-10-CM

## 2022-03-07 DIAGNOSIS — E10.22 TYPE 1 DIABETES MELLITUS WITH END-STAGE RENAL DISEASE (ESRD): Primary | ICD-10-CM

## 2022-03-07 DIAGNOSIS — E10.22 TYPE 1 DIABETES MELLITUS WITH STAGE 5 CHRONIC KIDNEY DISEASE NOT ON CHRONIC DIALYSIS: ICD-10-CM

## 2022-03-07 DIAGNOSIS — N18.6 TYPE 1 DIABETES MELLITUS WITH END-STAGE RENAL DISEASE (ESRD): Primary | ICD-10-CM

## 2022-03-07 RX ORDER — FLASH GLUCOSE SENSOR
6 KIT MISCELLANEOUS DAILY
Qty: 6 KIT | Refills: 3 | Status: ON HOLD | OUTPATIENT
Start: 2022-03-07 | End: 2022-11-08 | Stop reason: HOSPADM

## 2022-03-07 RX ORDER — FLASH GLUCOSE SCANNING READER
1 EACH MISCELLANEOUS DAILY
Qty: 1 EACH | Refills: 0 | Status: ON HOLD | OUTPATIENT
Start: 2022-03-07 | End: 2022-11-08 | Stop reason: HOSPADM

## 2022-03-08 ENCOUNTER — HOSPITAL ENCOUNTER (OUTPATIENT)
Dept: PREADMISSION TESTING | Facility: OTHER | Age: 27
Discharge: HOME | End: 2022-03-08
Attending: ANESTHESIOLOGY
Payer: MEDICARE

## 2022-03-08 DIAGNOSIS — Z01.818 PRE-OP TESTING: ICD-10-CM

## 2022-03-08 PROCEDURE — U0003 INFECTIOUS AGENT DETECTION BY NUCLEIC ACID (DNA OR RNA); SEVERE ACUTE RESPIRATORY SYNDROME CORONAVIRUS 2 (SARS-COV-2) (CORONAVIRUS DISEASE [COVID-19]), AMPLIFIED PROBE TECHNIQUE, MAKING USE OF HIGH THROUGHPUT TECHNOLOGIES AS DESCRIBED BY CMS-2020-01-R: HCPCS | Mod: NTX | Performed by: FAMILY MEDICINE

## 2022-03-08 PROCEDURE — U0005 INFEC AGEN DETEC AMPLI PROBE: HCPCS | Performed by: FAMILY MEDICINE

## 2022-03-08 NOTE — PROGRESS NOTES
LINKS immunization registry updated  Care Everywhere updated  Health Maintenance updated  Chart reviewed for overdue Proactive [facility] Encounters ([facility]) health maintenance testing (CRS, Breast Ca, Diabetic Eye Exam)   Orders entered: HgA1c

## 2022-03-09 ENCOUNTER — PATIENT MESSAGE (OUTPATIENT)
Dept: VASCULAR SURGERY | Facility: CLINIC | Age: 27
End: 2022-03-09
Payer: MEDICARE

## 2022-03-09 ENCOUNTER — PATIENT MESSAGE (OUTPATIENT)
Dept: PULMONOLOGY | Facility: CLINIC | Age: 27
End: 2022-03-09
Payer: MEDICARE

## 2022-03-09 LAB
SARS-COV-2 RNA RESP QL NAA+PROBE: NOT DETECTED
SARS-COV-2- CYCLE NUMBER: NORMAL

## 2022-03-10 ENCOUNTER — OFFICE VISIT (OUTPATIENT)
Dept: VASCULAR SURGERY | Facility: CLINIC | Age: 27
End: 2022-03-10
Attending: SURGERY
Payer: MEDICARE

## 2022-03-10 ENCOUNTER — PATIENT MESSAGE (OUTPATIENT)
Dept: ENDOSCOPY | Facility: HOSPITAL | Age: 27
End: 2022-03-10
Payer: MEDICARE

## 2022-03-10 ENCOUNTER — TELEPHONE (OUTPATIENT)
Dept: TRANSPLANT | Facility: CLINIC | Age: 27
End: 2022-03-10
Payer: MEDICARE

## 2022-03-10 ENCOUNTER — OFFICE VISIT (OUTPATIENT)
Dept: GASTROENTEROLOGY | Facility: CLINIC | Age: 27
End: 2022-03-10
Payer: MEDICARE

## 2022-03-10 ENCOUNTER — HOSPITAL ENCOUNTER (OUTPATIENT)
Dept: VASCULAR SURGERY | Facility: CLINIC | Age: 27
Discharge: HOME | End: 2022-03-10
Attending: SURGERY
Payer: MEDICARE

## 2022-03-10 VITALS
DIASTOLIC BLOOD PRESSURE: 84 MMHG | SYSTOLIC BLOOD PRESSURE: 129 MMHG | WEIGHT: 143.06 LBS | HEART RATE: 68 BPM | BODY MASS INDEX: 24.42 KG/M2 | HEIGHT: 64 IN

## 2022-03-10 VITALS
BODY MASS INDEX: 24.1 KG/M2 | SYSTOLIC BLOOD PRESSURE: 122 MMHG | WEIGHT: 141.13 LBS | HEART RATE: 91 BPM | DIASTOLIC BLOOD PRESSURE: 72 MMHG | TEMPERATURE: 99 F | HEIGHT: 64 IN

## 2022-03-10 DIAGNOSIS — Z99.2 END STAGE RENAL FAILURE ON DIALYSIS: Primary | ICD-10-CM

## 2022-03-10 DIAGNOSIS — Z76.82 ORGAN TRANSPLANT CANDIDATE: Primary | ICD-10-CM

## 2022-03-10 DIAGNOSIS — Z99.2 END STAGE RENAL FAILURE ON DIALYSIS: ICD-10-CM

## 2022-03-10 DIAGNOSIS — T82.898D ARTERIOVENOUS FISTULA OCCLUSION, SUBSEQUENT ENCOUNTER: ICD-10-CM

## 2022-03-10 DIAGNOSIS — N18.6 END STAGE RENAL FAILURE ON DIALYSIS: Primary | ICD-10-CM

## 2022-03-10 DIAGNOSIS — N18.6 END STAGE RENAL FAILURE ON DIALYSIS: ICD-10-CM

## 2022-03-10 DIAGNOSIS — Z01.818 PRE-OP TESTING: ICD-10-CM

## 2022-03-10 PROCEDURE — 99213 PR OFFICE/OUTPT VISIT, EST, LEVL III, 20-29 MIN: ICD-10-PCS | Mod: S$PBB,TXP,, | Performed by: INTERNAL MEDICINE

## 2022-03-10 PROCEDURE — 93990 DOPPLER FLOW TESTING: CPT | Mod: PBBFAC,TXP | Performed by: SURGERY

## 2022-03-10 PROCEDURE — 3079F PR MOST RECENT DIASTOLIC BLOOD PRESSURE 80-89 MM HG: ICD-10-PCS | Mod: CPTII,TXP,, | Performed by: INTERNAL MEDICINE

## 2022-03-10 PROCEDURE — 3008F PR BODY MASS INDEX (BMI) DOCUMENTED: ICD-10-PCS | Mod: CPTII,TXP,, | Performed by: INTERNAL MEDICINE

## 2022-03-10 PROCEDURE — 1159F PR MEDICATION LIST DOCUMENTED IN MEDICAL RECORD: ICD-10-PCS | Mod: CPTII,TXP,, | Performed by: INTERNAL MEDICINE

## 2022-03-10 PROCEDURE — 3008F BODY MASS INDEX DOCD: CPT | Mod: CPTII,NTX,S$GLB, | Performed by: SURGERY

## 2022-03-10 PROCEDURE — 93990 DOPPLER FLOW TESTING: CPT | Mod: 26,S$PBB,TXP, | Performed by: SURGERY

## 2022-03-10 PROCEDURE — 3066F NEPHROPATHY DOC TX: CPT | Mod: CPTII,NTX,S$GLB, | Performed by: SURGERY

## 2022-03-10 PROCEDURE — 99999 PR PBB SHADOW E&M-EST. PATIENT-LVL III: ICD-10-PCS | Mod: PBBFAC,TXP,, | Performed by: INTERNAL MEDICINE

## 2022-03-10 PROCEDURE — 1159F MED LIST DOCD IN RCRD: CPT | Mod: CPTII,TXP,, | Performed by: INTERNAL MEDICINE

## 2022-03-10 PROCEDURE — 93990 PR DUPLEX HEMODIALYSIS ACCESS: ICD-10-PCS | Mod: 26,S$PBB,TXP, | Performed by: SURGERY

## 2022-03-10 PROCEDURE — 3074F SYST BP LT 130 MM HG: CPT | Mod: CPTII,NTX,S$GLB, | Performed by: SURGERY

## 2022-03-10 PROCEDURE — 99024 POSTOP FOLLOW-UP VISIT: CPT | Mod: NTX,S$GLB,, | Performed by: SURGERY

## 2022-03-10 PROCEDURE — 3078F DIAST BP <80 MM HG: CPT | Mod: CPTII,NTX,S$GLB, | Performed by: SURGERY

## 2022-03-10 PROCEDURE — 3078F PR MOST RECENT DIASTOLIC BLOOD PRESSURE < 80 MM HG: ICD-10-PCS | Mod: CPTII,NTX,S$GLB, | Performed by: SURGERY

## 2022-03-10 PROCEDURE — 3066F PR DOCUMENTATION OF TREATMENT FOR NEPHROPATHY: ICD-10-PCS | Mod: CPTII,TXP,, | Performed by: INTERNAL MEDICINE

## 2022-03-10 PROCEDURE — 99213 OFFICE O/P EST LOW 20 MIN: CPT | Mod: S$PBB,TXP,, | Performed by: INTERNAL MEDICINE

## 2022-03-10 PROCEDURE — 3066F NEPHROPATHY DOC TX: CPT | Mod: CPTII,TXP,, | Performed by: INTERNAL MEDICINE

## 2022-03-10 PROCEDURE — 99999 PR PBB SHADOW E&M-EST. PATIENT-LVL V: ICD-10-PCS | Mod: PBBFAC,TXP,, | Performed by: SURGERY

## 2022-03-10 PROCEDURE — 3008F BODY MASS INDEX DOCD: CPT | Mod: CPTII,TXP,, | Performed by: INTERNAL MEDICINE

## 2022-03-10 PROCEDURE — 99999 PR PBB SHADOW E&M-EST. PATIENT-LVL V: CPT | Mod: PBBFAC,TXP,, | Performed by: SURGERY

## 2022-03-10 PROCEDURE — 99024 PR POST-OP FOLLOW-UP VISIT: ICD-10-PCS | Mod: NTX,S$GLB,, | Performed by: SURGERY

## 2022-03-10 PROCEDURE — 3074F SYST BP LT 130 MM HG: CPT | Mod: CPTII,TXP,, | Performed by: INTERNAL MEDICINE

## 2022-03-10 PROCEDURE — 99999 PR PBB SHADOW E&M-EST. PATIENT-LVL III: CPT | Mod: PBBFAC,TXP,, | Performed by: INTERNAL MEDICINE

## 2022-03-10 PROCEDURE — 3066F PR DOCUMENTATION OF TREATMENT FOR NEPHROPATHY: ICD-10-PCS | Mod: CPTII,NTX,S$GLB, | Performed by: SURGERY

## 2022-03-10 PROCEDURE — 3074F PR MOST RECENT SYSTOLIC BLOOD PRESSURE < 130 MM HG: ICD-10-PCS | Mod: CPTII,NTX,S$GLB, | Performed by: SURGERY

## 2022-03-10 PROCEDURE — 3008F PR BODY MASS INDEX (BMI) DOCUMENTED: ICD-10-PCS | Mod: CPTII,NTX,S$GLB, | Performed by: SURGERY

## 2022-03-10 PROCEDURE — 3074F PR MOST RECENT SYSTOLIC BLOOD PRESSURE < 130 MM HG: ICD-10-PCS | Mod: CPTII,TXP,, | Performed by: INTERNAL MEDICINE

## 2022-03-10 PROCEDURE — 3079F DIAST BP 80-89 MM HG: CPT | Mod: CPTII,TXP,, | Performed by: INTERNAL MEDICINE

## 2022-03-10 RX ORDER — SODIUM CHLORIDE 0.9 % (FLUSH) 0.9 %
10 SYRINGE (ML) INJECTION
Status: CANCELLED | OUTPATIENT
Start: 2022-03-10

## 2022-03-10 RX ORDER — LIDOCAINE HYDROCHLORIDE 10 MG/ML
1 INJECTION, SOLUTION EPIDURAL; INFILTRATION; INTRACAUDAL; PERINEURAL ONCE
Status: CANCELLED | OUTPATIENT
Start: 2022-03-10 | End: 2022-03-10

## 2022-03-10 RX ORDER — MUPIROCIN 20 MG/G
OINTMENT TOPICAL
Status: CANCELLED | OUTPATIENT
Start: 2022-03-10

## 2022-03-10 NOTE — PROGRESS NOTES
Isabela Read  03/10/2022    Interval History 3/10/22:  Pt returns to the clinic for a follow up. She was at the dialysis center today but they weren't able to use her AVF which she believes is because the tech wasn't able to access it correctly. This is the first dialysis session she missed because of this. Ultrasound of AVF today shows flow volume 692 ml/min. Previous flow volume was 1263 in January. She has no other complaints.    HPI 9 /30/21:  Patient is a 27 y.o. female with a h/o CKD 5, uncontrolled T1DM, nephrotic syndrome, and HTN who is here today for evaluation of  HD access. Patient states that she has long history of T1DM with previous hospitalizations for uncontrolled blood sugars. She has been having increased issues with extremity and abdominal issues over the last few months. She states she was recently discharged after being admitted with severe abdominal distension. She is on lasix. She states this has somewhat improved. She states that she has had more difficulties in controlling her blood pressure as well. She states she is still urinating. She denies prior HD. She has restrictive lung disease as well and states she gets SOB after taking a couple of steps. She denies any arm/hand weakness, numbness, or pain.      Denies history of MI/stroke  Tobacco use: never smoker    Interval History (5/6/21): Now s/p LUE BB AVF creation on 4/7/21. Dopplers performed today. Uneventful postoperative course. Pain is well controlled. Denies new-onset weakness, numbness, or swelling. She is scheduled to see nephrology next week. She maintains compliance with her ASA.     Interval History (8/5/21): Now s/p LUE BB AVF creation on 4/7/21. Dopplers performed today. Pt states they have not been able to cannulate her AVF and have only been using her R IJ catheter for HD, which has been going well. She states the thrill in her arm has decreased and has been noticing that it pulsates intermittently. Denies  new-onset weakness, numbness, or swelling. She maintains compliance with her ASA.     Interval History (8/5/21): Pt returns to clinic for evaluation of her LUE BB AVF. She states she is doing well overall without any major changes to her health. She is still unable to dialyze through her AVF and is using her RIJ permacath. She notes she does still have a good thrill in her AVF and denies any drainage, redness, or swelling around it. She continues to be compliant with all of her medications.    Interval History 1/6/22: Underwent revision of AVF with BVT on 12/10/21. She returns to clinic today for her post operative visit.     Past Medical History:   Diagnosis Date    Anemia     Chronic hypertension with exacerbation during pregnancy in second trimester 11/6/2020    Current regimen (11/6/20):  - Carvedilol 12.5 mg BID - Nifedipine 30 mg daily Baseline CKD + proteinuria    Chronic kidney disease     Depression     Diabetes mellitus     Diarrhea     Encounter for blood transfusion     Gastroparesis     Hepatomegaly 4/29/2021    Hx of psychiatric care     Hyperlipidemia     Hypertension     Nephrotic syndrome     Nephrotic syndrome 3/4/2021    Palpitations     Poor fetal growth affecting management of mother in second trimester 10/15/2020    Restrictive lung disease     Severe pre-eclampsia in second trimester 11/6/2020     Past Surgical History:   Procedure Laterality Date    AV FISTULA PLACEMENT Left 4/7/2021    Procedure: CREATION, AV FISTULA;  Surgeon: Roberto Ryan MD;  Location: [facility] OR [address] [place] [address];  Service: Peripheral Vascular;  Laterality: Left;    FISTULOGRAM N/A 8/11/2021    Procedure: Fistulogram;  Surgeon: Roberto Ryan MD;  Location: [facility] CATH LAB;  Service: Cardiology;  Laterality: N/A;    PERCUTANEOUS TRANSLUMINAL ANGIOPLASTY OF ARTERIOVENOUS FISTULA N/A 8/11/2021    Procedure: PTA, AV FISTULA;  Surgeon: Roberto Ryan MD;  Location: [facility] CATH LAB;  Service:  Cardiology;  Laterality: N/A;    REMOVAL IMPLANT, POSTERIOR SEGMENT, INTRAOCULAR Right 2/1/2022    Procedure: REMOVAL IMPLANT, POSTERIOR SEGMENT, INTRAOCULAR;  Surgeon: Maximilian Montaño MD;  Location: [facility] OR 1ST FLR;  Service: Ophthalmology;  Laterality: Right;    REPAIR OF RETINAL DETACHMENT WITH VITRECTOMY Right 1/25/2022    Procedure: REPAIR, RETINAL DETACHMENT, WITH VITRECTOMY, MEMBRANE PEEL, LASER, INJECTION OF GAS VS OIL;  Surgeon: Maximilian Montaño MD;  Location: [facility] OR 1ST FLR;  Service: Ophthalmology;  Laterality: Right;    REVISION OF ARTERIOVENOUS FISTULA Left 12/10/2021    Procedure: REVISION, AV FISTULA with BVT;  Surgeon: Roberto Ryan MD;  Location: [facility] OR 2ND FLR;  Service: Peripheral Vascular;  Laterality: Left;    VITRECTOMY BY PARS PLANA APPROACH Right 2/1/2022    Procedure: VITRECTOMY, PARS PLANA APPROACH;  Surgeon: Maximilian Montaño MD;  Location: [facility] OR 1ST FLR;  Service: Ophthalmology;  Laterality: Right;     Family History   Problem Relation Age of Onset    Hypertension Mother     Heart disease Father     Diabetes Brother     Celiac disease Neg Hx     Cirrhosis Neg Hx     Colon cancer Neg Hx     Colon polyps Neg Hx     Crohn's disease Neg Hx     Inflammatory bowel disease Neg Hx     Liver cancer Neg Hx     Liver disease Neg Hx     Rectal cancer Neg Hx     Stomach cancer Neg Hx     Ulcerative colitis Neg Hx     Esophageal cancer Neg Hx     Hemochromatosis Neg Hx     Pancreatic cancer Neg Hx     Kidney cancer Neg Hx     Bladder Cancer Neg Hx     Uterine cancer Neg Hx     Ovarian cancer Neg Hx      Social History     Socioeconomic History    Marital status: Single   Occupational History    Occupation:  at [employer]   Tobacco Use    Smoking status: Never Smoker    Smokeless tobacco: Never Used   Substance and Sexual Activity    Alcohol use: No    Drug use: No    Sexual activity: Not Currently     Partners: Male     Comment: monogamous    Social History Narrative    Caregiver        Single    No kids    Had Miscarriage  At 23 weeks from preeclampsia    Disabled       Current Outpatient Medications:     acetaminophen (TYLENOL) 325 MG tablet, Take 1 tablet (325 mg total) by mouth every 6 (six) hours as needed for Pain., Disp: , Rfl: 0    aspirin (ECOTRIN) 81 MG EC tablet, Take 1 tablet (81 mg total) by mouth every evening., Disp: 150 tablet, Rfl: 1    blood sugar diagnostic Strp, To check BG 4 - 6 times daily, to use with insurance preferred meter, Disp: 150 each, Rfl: 11    calcitRIOL (ROCALTROL) 0.5 MCG Cap, Take 0.5 mcg by mouth once daily., Disp: , Rfl:     carvediloL (COREG) 25 MG tablet, Take 1 tablet (25 mg total) by mouth 2 (two) times daily., Disp: 60 tablet, Rfl: 11    escitalopram oxalate (LEXAPRO) 20 MG tablet, Take 1 tablet (20 mg total) by mouth once daily. (Patient taking differently: Take 20 mg by mouth daily as needed (depression/anxiety).), Disp: 30 tablet, Rfl: 11    ferrous sulfate 325 (65 FE) MG EC tablet, Take 1 tablet (325 mg total) by mouth 2 (two) times daily., Disp: 180 tablet, Rfl: 2    flash glucose scanning reader (FREESTYLE MARCY 14 DAY READER) Misc, 1 each by Misc.(Non-Drug; Combo Route) route once daily., Disp: 1 each, Rfl: 0    flash glucose sensor (FREESTYLE MARCY 14 DAY SENSOR) Kit, 6 kits by Misc.(Non-Drug; Combo Route) route once daily., Disp: 6 kit, Rfl: 3    hydrALAZINE (APRESOLINE) 25 MG tablet, Take 1 tablet (25 mg total) by mouth 2 (two) times a day., Disp: 60 tablet, Rfl: 3    HYDROcodone-acetaminophen (NORCO) 5-325 mg per tablet, Take 1 tablet by mouth every 6 (six) hours as needed for Pain., Disp: 10 tablet, Rfl: 0    insulin detemir U-100 (LEVEMIR FLEXTOUCH) 100 unit/mL (3 mL) SubQ InPn pen, 10 units subcutaneously in the morning and 12 units sq at night, Disp: 15 mL, Rfl: 6    insulin lispro (HUMALOG KWIKPEN INSULIN) 100 unit/mL pen, Inject 10 units into skin the skin 3 three times daily  "with meals, Disp: 15 mL, Rfl: 6    lancets Misc, To check BG 4 - 6 times daily, to use with insurance preferred meter, Disp: 150 each, Rfl: 11    medroxyPROGESTERone (DEPO-PROVERA) 150 mg/mL Syrg, Inject 1 mL (150 mg total) into the muscle once. for 1 dose, Disp: 1 mL, Rfl: 3    NIFEdipine (PROCARDIA-XL) 60 MG (OSM) 24 hr tablet, Take 1 tablet (60 mg total) by mouth 2 (two) times a day., Disp: 60 tablet, Rfl: 11    pen needle, diabetic 32 gauge x 5/32" Ndle, For three times daily injection, Disp: 100 each, Rfl: 11    rosuvastatin (CRESTOR) 20 MG tablet, Take 1 tablet (20 mg total) by mouth every evening., Disp: 90 tablet, Rfl: 3    sevelamer carbonate (RENVELA) 800 mg Tab, TAKE 1 TABLET BY MOUTH THREE TIMES DAILY WITH MEALS, Disp: 90 tablet, Rfl: 0    traZODone (DESYREL) 50 MG tablet, Take 1 tablet (50 mg total) by mouth nightly as needed for Insomnia., Disp: 30 tablet, Rfl: 1  No current facility-administered medications for this visit.    Facility-Administered Medications Ordered in Other Visits:     0.9%  NaCl infusion, , Intravenous, Continuous, Tavo Becker MD, Last Rate: 25 mL/hr at 12/10/21 1043, New Bag at 12/10/21 1043    REVIEW OF SYSTEMS:  General: negative; ENT: negative; Allergy and Immunology: negative; Hematological and Lymphatic: Negative; Endocrine: negative; Respiratory: no cough, shortness of breath, or wheezing; Cardiovascular: no chest pain or dyspnea on exertion; Gastrointestinal: no abdominal pain/back, change in bowel habits, or bloody stools; Genito-Urinary: no dysuria, trouble voiding, or hematuria; Musculoskeletal: negative  Neurological: no TIA or stroke symptoms; Psychiatric: no nervousness, anxiety or depression.    PHYSICAL EXAM:                General appearance:  Alert, well-appearing, and in no distress.  Oriented to person, place, and time   Neurological: Normal speech, no focal findings noted; CN II - XII grossly intact           Musculoskeletal: Digits/nail without " cyanosis/clubbing.  Normal muscle strength/tone.                 Neck: Supple, no significant adenopathy; thyroid is not enlarged                  No carotid bruit can be auscultated                Chest:  Clear to auscultation, no wheezes, rales or rhonchi, symmetric air entry     No use of accessory muscles             Cardiac: Normal rate and regular rhythm, S1 and S2 normal; PMI non-displaced          Abdomen: Soft, nontender, nondistended, no masses or organomegaly     No rebound tenderness noted; bowel sounds normal     No groin adenopathy      Extremities:        2+ brachial pulses bilaterally     2+ femoral pulses bilaterally     2+ pedal pulses palpable.     1+ left radial pulse     +1 pre-tibial edema     No ulcerations     +1 edema in upper extremities     LUE BB AVF with moderate thrill and pulsation distally that is faint. well-healed incision, intact distal pulses    LAB RESULTS:  Lab Results   Component Value Date    K 4.2 01/19/2022    K 4.5 01/19/2022    K 4.5 01/18/2022    K 4.5 01/18/2022    CREATININE 2.8 (H) 01/19/2022    CREATININE 2.3 (H) 01/19/2022    CREATININE 3.0 (H) 01/18/2022    CREATININE 2.9 (H) 01/18/2022     Lab Results   Component Value Date    WBC 9.36 01/19/2022    WBC 16.63 (H) 01/18/2022    WBC 18.13 (H) 01/17/2022    HCT 22.3 (L) 01/19/2022    HCT 21.5 (L) 01/18/2022    HCT 24.9 (L) 01/17/2022     01/19/2022     01/18/2022     01/17/2022     Lab Results   Component Value Date    HGBA1C 10.9 (H) 10/23/2021    HGBA1C 10.2 (H) 09/17/2021    HGBA1C 10.9 (H) 04/06/2021     IMAGING:    VAS HD Access 1/6/22:   History   ======     General History   Other: -S/P Left AV fistula revision with basilic vein transposition     Dialysis Access   =============     Dialysis access location:  Left Arm   Type of AV access: Brachiobasilic AVF   Lt inflow A PSV    190 cm/s   Lt at anastomosis PSV  527 cm/s   Lt A distal anastomosis PSV    215 cm/s   Lt fistula prox depth  3.3  mm   Lt fistula prox AP diam    6.2 mm   Lt fistula prox PSV    241 cm/s   Lt fistula mid depth   2.6 mm   Lt fistula mid AP diam 6.7 mm   Lt fistula mid  cm/s   Lt fistula mid flow volume 1,263 ml/min   Lt fistula distal depth    2.4 mm   Lt fistula distal AP diam  6.1 mm   Lt fistula distal PSV  113 cm/s   Lt outflow V prox PSV  131 cm/s   Lt outflow V mid PSV   322 cm/s   Lt outflow V distal PSV    176 cm/s     Impression   =========     Color flow duplex imaging reveals a patent left Brachiobasilic AV fistula. An elevated velocity of 322 cm/s (from 131 cm/s) is visualized   within the Mid outflow. This finding is suggestive of a focal stenosis; however, this velocity elevation appears to be due to a valve   remnant. The volume flow at the mid bicep measures 1263 mL/min. Outflow vein measurements are taken.      IMP/PLAN:  27 y.o. female with h/o CKD 5, uncontrolled T1DM, nephrotic syndrome, and HTN s/p L AVF created on 4/7/21. She underwent an AVF revision with BVT and a fistulogram on 12/10/21. She returns to clinic for f/u.  Doing well. Strong thrill in AVF. Was unable to undergo dialysis today. Flow volume on  ml/min, a significant decrease.      - Plan for fistulogram LUE AVF the coming weeks, risks and benefits discussed in detail    Roberto Ryan MD  Vascular & Endovascular Surgery

## 2022-03-10 NOTE — TELEPHONE ENCOUNTER
Received a call from patient stating that she's having issues with her dialysis access and unable to get her dialysis treatment today. She is schedule to go get her graft declotted later today and dialysis will be scheduled for tomorrow. She's concern that she's not going to be able to do her colonoscopy prep today for her scheduled colonoscopy tomorrow. Patient instructed to contact endoscopy department to advise. Department number provided. Patient voiced understanding

## 2022-03-11 ENCOUNTER — TELEPHONE (OUTPATIENT)
Dept: ENDOCRINOLOGY | Facility: CLINIC | Age: 27
End: 2022-03-11
Payer: MEDICARE

## 2022-03-11 ENCOUNTER — TELEPHONE (OUTPATIENT)
Dept: OPHTHALMOLOGY | Facility: CLINIC | Age: 27
End: 2022-03-11
Payer: MEDICARE

## 2022-03-11 NOTE — TELEPHONE ENCOUNTER
----- Message from Jennifer Ndiaye MA sent at 3/11/2022  3:22 PM CST -----  Contact: [facility]'s patient, pt has never been seen by dr garcia    ----- Message -----  From: Heather Isaac  Sent: 3/11/2022   3:09 PM CST  To: Britney Das Staff    Who Called: PT  Regarding: The pt's insurance called stating the pt specifically requested for Freestyle Jayda 2 strips. Please contact the pt for additional information. The rep and myself are unsure of what the pt is speaking of.   Would the patient rather a call back or a response via [facility]? Call back  Best Call Back Number: [phone]  Additional Information:

## 2022-03-11 NOTE — PROGRESS NOTES
[facility] Consultation Note    Reason for Consult:  The encounter diagnosis was Organ transplant candidate.    PCP:   Tavo Bhatia       Referring MD:  No referring provider defined for this encounter.    Initial History of Present Illness (HPI):  This is a 27 y.o. female here for follow-up pre renal transplant evaluation patient was originally seen telemedicine video visit in October 2021 for patient stating that she needs evaluation by GI before they can list her for renal transplant get history of nausea vomiting for a long time that seems to resolve greatly after starting dialysis in May of 2021 she is dialyze Tuesday Thursday Saturdays she does have chronic diarrhea which started around 2017 no sick contacts has in around anybody with chronic diarrhea she never had an EGD never had a colonoscopy she is on iron pills for iron deficiency anemia she rarely has a mental.  Now maybe once every several months she does not think she is pregnant she unfortunately had a miscarriage not too long ago due to preeclampsia she currently knows not to get pregnant until after a successful kidney transplant she denies any chest pain no shortness of breath no blood in her stool she can sometimes have as many as 3-6 watery bowel movements a day no blood.  Is a longstanding type 1 diabetic.  She does take aspirin.  A PPI no dysphagia no odynophagia no GERD.  Shortly at the time that she was seen her diarrhea completely resolved she is no longer having diarrhea she had to reschedule her EGD colonoscopies she has is scheduled for next week she was not able turned in any of her stool studies cousin diarrhea has resolved in the interim she had problems with her right eye redness that she has been followed closely by redness surgery she also gets dialyzed Tuesday Thursday Saturday but missed dialysis today show she is going for dialysis tomorrow.  Otherwise she feels very good.     Abdominal pain -  no  Reflux - no  Dysphagia - no   Bowel habits -  as above no further diarrhea back to normal bowel habits once daily  GI bleeding - none  NSAID usage - aspirin        ROS:  Constitutional: No fevers, chills, No weight loss  ENT:  No heartburn no dysphagia no odynophagia no hoarseness  CV: No chest pain, no palpitation  Pulm: No cough, No shortness of breath, no wheezing  Ophtho:  As above  GI: see HPI  Derm: No rash, no itching  Heme: No lymphadenopathy, No easy bruising  MSK: No significant arthritis  : No dysuria, No hematuria  Endo: No hot or cold intolerance  Neuro: No syncope, No seizure, no strokes  Psych: No uncontrolled anxiety, No uncontrolled depression    Interval HPI 03/10/2022:  The patient's last visit with me was on 10/1/2021.    Medical History:  has a past medical history of Anemia, Chronic hypertension with exacerbation during pregnancy in second trimester (11/6/2020), Chronic kidney disease, Depression, Diabetes mellitus, Diarrhea, Encounter for blood transfusion, Gastroparesis, Hepatomegaly (4/29/2021), psychiatric care, Hyperlipidemia, Hypertension, Nephrotic syndrome, Nephrotic syndrome (3/4/2021), Palpitations, Poor fetal growth affecting management of mother in second trimester (10/15/2020), Restrictive lung disease, and Severe pre-eclampsia in second trimester (11/6/2020).    Surgical History:  has a past surgical history that includes AV fistula placement (Left, 4/7/2021); Percutaneous transluminal angioplasty of arteriovenous fistula (N/A, 8/11/2021); Fistulogram (N/A, 8/11/2021); Revision of arteriovenous fistula (Left, 12/10/2021); Repair of retinal detachment with vitrectomy (Right, 1/25/2022); removal implant, posterior segment, intraocular (Right, 2/1/2022); and Vitrectomy by pars plana approach (Right, 2/1/2022).    Family History: family history includes Diabetes in her brother; Heart disease in her father; Hypertension in her mother..     Social History:  reports that she has  never smoked. She has never used smokeless tobacco. She reports that she does not drink alcohol and does not use drugs.    Review of patient's allergies indicates:  No Known Allergies    Medication List with Changes/Refills   Current Medications    ACETAMINOPHEN (TYLENOL) 325 MG TABLET    Take 1 tablet (325 mg total) by mouth every 6 (six) hours as needed for Pain.    ASPIRIN (ECOTRIN) 81 MG EC TABLET    Take 1 tablet (81 mg total) by mouth every evening.    BLOOD SUGAR DIAGNOSTIC STRP    To check BG 4 - 6 times daily, to use with insurance preferred meter    CALCITRIOL (ROCALTROL) 0.5 MCG CAP    Take 0.5 mcg by mouth once daily.    CARVEDILOL (COREG) 25 MG TABLET    Take 1 tablet (25 mg total) by mouth 2 (two) times daily.    ESCITALOPRAM OXALATE (LEXAPRO) 20 MG TABLET    Take 1 tablet (20 mg total) by mouth once daily.    FERROUS SULFATE 325 (65 FE) MG EC TABLET    Take 1 tablet (325 mg total) by mouth 2 (two) times daily.    FLASH GLUCOSE SCANNING READER (FREESTYLE MARCY 14 DAY READER) MISC    1 each by Misc.(Non-Drug; Combo Route) route once daily.    FLASH GLUCOSE SENSOR (FREESTYLE MARCY 14 DAY SENSOR) KIT    6 kits by Misc.(Non-Drug; Combo Route) route once daily.    HYDRALAZINE (APRESOLINE) 25 MG TABLET    Take 1 tablet (25 mg total) by mouth 2 (two) times a day.    HYDROCODONE-ACETAMINOPHEN (NORCO) 5-325 MG PER TABLET    Take 1 tablet by mouth every 6 (six) hours as needed for Pain.    INSULIN DETEMIR U-100 (LEVEMIR FLEXTOUCH) 100 UNIT/ML (3 ML) SUBQ INPN PEN    10 units subcutaneously in the morning and 12 units sq at night    INSULIN LISPRO (HUMALOG KWIKPEN INSULIN) 100 UNIT/ML PEN    Inject 10 units into skin the skin 3 three times daily with meals    LANCETS MISC    To check BG 4 - 6 times daily, to use with insurance preferred meter    MEDROXYPROGESTERONE (DEPO-PROVERA) 150 MG/ML SYRG    Inject 1 mL (150 mg total) into the muscle once. for 1 dose    NIFEDIPINE (PROCARDIA-XL) 60 MG (OSM) 24 HR TABLET     "Take 1 tablet (60 mg total) by mouth 2 (two) times a day.    PEN NEEDLE, DIABETIC 32 GAUGE X 5/32" NDLE    For three times daily injection    ROSUVASTATIN (CRESTOR) 20 MG TABLET    Take 1 tablet (20 mg total) by mouth every evening.    SEVELAMER CARBONATE (RENVELA) 800 MG TAB    TAKE 1 TABLET BY MOUTH THREE TIMES DAILY WITH MEALS    TRAZODONE (DESYREL) 50 MG TABLET    Take 1 tablet (50 mg total) by mouth nightly as needed for Insomnia.         Objective Findings:    Vital Signs:  /84 (BP Location: Left arm, Patient Position: Sitting, BP Method: Medium (Automatic))   Pulse 68   Ht 5' 4" (1.626 m)   Wt 64.9 kg (143 lb 1.3 oz)   BMI 24.56 kg/m²   Body mass index is 24.56 kg/m².    Physical Exam:  General Appearance: Well appearing in no acute distress  Eyes:    No scleral icterus  ENT:  No lesions or masses   Lungs: CTA bilaterally, no wheezes, no rhonchi, no rales  Heart:  S1, S2 normal, no murmurs heard  Abdomen:  Non distended, soft, no guarding, no rebound, no tenderness, no appreciated ascites, no bruits, no hepatosplenomegaly,  No CVA tenderness, no appreciated hernias, no D[place] sign, no McBurney point tenderness  Musculoskeletal:  No major joint deformities  Skin: No petechiae or rash on exposed skin areas  Neurologic:  Alert and oriented x4  Psychiatric:  Normal speech mentation and affect    Labs:  Lab Results   Component Value Date    WBC 9.36 01/19/2022    HGB 7.1 (L) 01/19/2022    HCT 22.3 (L) 01/19/2022     01/19/2022    CHOL 135 10/11/2021    TRIG 36 10/11/2021    HDL 71 10/11/2021    ALT 13 01/18/2022    AST 18 01/18/2022     01/19/2022    K 4.2 01/19/2022     01/19/2022    CREATININE 2.8 (H) 01/19/2022    BUN 14 01/19/2022    CO2 27 01/19/2022    TSH 3.786 08/04/2020    INR 1.0 05/21/2021    GLUF 185 (H) 08/05/2020    HGBA1C 10.9 (H) 10/23/2021               Medical Decision Making:  Labs have been reviewed  Total time with the patient reviewing prior clinic   notes orders " discussed with patient labs has been 20 minutes with greater than 50% of time face-to-face in room with patient      Assessment:  1. Organ transplant candidate         Recommendations:  1. Patient has EGD colonoscopy scheduled for next week for further evaluation of her history of diarrhea.  Which is improved greatly  2. Return GI clinic 8 weeks for follow-up okay for telemedicine video visit     No follow-ups on file.      Order summary:         Thank you so much for allowing me to participate in the care of Isabela Hernandez MD

## 2022-03-11 NOTE — TELEPHONE ENCOUNTER
Left message for [facility] rep to give us a call back about this patients freestyle

## 2022-03-11 NOTE — TELEPHONE ENCOUNTER
----- Message from Patrica Richardson sent at 3/11/2022  9:42 AM CST -----  Patient is calling to reschedule post op appt to next week. The schedule is not populating on my end due to her insurance.   Please call back at [phone]

## 2022-03-15 ENCOUNTER — OFFICE VISIT (OUTPATIENT)
Dept: OPHTHALMOLOGY | Facility: CLINIC | Age: 27
End: 2022-03-15
Payer: MEDICARE

## 2022-03-15 DIAGNOSIS — E10.3592 TYPE 1 DIABETES MELLITUS WITH PROLIFERATIVE RETINOPATHY OF LEFT EYE WITHOUT MACULAR EDEMA: ICD-10-CM

## 2022-03-15 DIAGNOSIS — E10.3521: ICD-10-CM

## 2022-03-15 DIAGNOSIS — E10.3531: Primary | ICD-10-CM

## 2022-03-15 PROCEDURE — 1159F MED LIST DOCD IN RCRD: CPT | Mod: CPTII,S$GLB,, | Performed by: OPHTHALMOLOGY

## 2022-03-15 PROCEDURE — 3066F PR DOCUMENTATION OF TREATMENT FOR NEPHROPATHY: ICD-10-PCS | Mod: CPTII,S$GLB,, | Performed by: OPHTHALMOLOGY

## 2022-03-15 PROCEDURE — 1160F RVW MEDS BY RX/DR IN RCRD: CPT | Mod: CPTII,S$GLB,, | Performed by: OPHTHALMOLOGY

## 2022-03-15 PROCEDURE — 92134 POSTERIOR SEGMENT OCT RETINA (OCULAR COHERENCE TOMOGRAPHY)-BOTH EYES: ICD-10-PCS | Mod: S$GLB,,, | Performed by: OPHTHALMOLOGY

## 2022-03-15 PROCEDURE — 1159F PR MEDICATION LIST DOCUMENTED IN MEDICAL RECORD: ICD-10-PCS | Mod: CPTII,S$GLB,, | Performed by: OPHTHALMOLOGY

## 2022-03-15 PROCEDURE — 99213 OFFICE O/P EST LOW 20 MIN: CPT | Mod: PBBFAC | Performed by: OPHTHALMOLOGY

## 2022-03-15 PROCEDURE — 99024 PR POST-OP FOLLOW-UP VISIT: ICD-10-PCS | Mod: S$GLB,,, | Performed by: OPHTHALMOLOGY

## 2022-03-15 PROCEDURE — 1160F PR REVIEW ALL MEDS BY PRESCRIBER/CLIN PHARMACIST DOCUMENTED: ICD-10-PCS | Mod: CPTII,S$GLB,, | Performed by: OPHTHALMOLOGY

## 2022-03-15 PROCEDURE — 99999 PR PBB SHADOW E&M-EST. PATIENT-LVL III: CPT | Mod: PBBFAC,,, | Performed by: OPHTHALMOLOGY

## 2022-03-15 PROCEDURE — 3066F NEPHROPATHY DOC TX: CPT | Mod: CPTII,S$GLB,, | Performed by: OPHTHALMOLOGY

## 2022-03-15 PROCEDURE — 99999 PR PBB SHADOW E&M-EST. PATIENT-LVL III: ICD-10-PCS | Mod: PBBFAC,,, | Performed by: OPHTHALMOLOGY

## 2022-03-15 PROCEDURE — 99024 POSTOP FOLLOW-UP VISIT: CPT | Mod: S$GLB,,, | Performed by: OPHTHALMOLOGY

## 2022-03-15 PROCEDURE — 92134 CPTRZ OPH DX IMG PST SGM RTA: CPT | Mod: PBBFAC | Performed by: OPHTHALMOLOGY

## 2022-03-15 NOTE — PROGRESS NOTES
HPI     DLS:  03/03/2022 by Dr. ARMIN Montaño MD      Pt here for PO f/u and reports seeing better and gas bubble has   diminished.    -eye pain  -headaches  -blurred [facility]  -diplopia    Gtts: PF OD QID--completed          Atropine OD Qday--cpmpleted        Imaging:  Increase in IT SRF OD    See report    Assessment/Plan:     1. 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  Pt had IOP issues with prior SO

## 2022-03-15 NOTE — Clinical Note
Increase in IT SRF - likely communication with small break.  Attached in midperiphery with good PRP, except some extension inferiorly

## 2022-03-16 ENCOUNTER — PATIENT MESSAGE (OUTPATIENT)
Dept: OBSTETRICS AND GYNECOLOGY | Facility: CLINIC | Age: 27
End: 2022-03-16
Payer: MEDICARE

## 2022-03-16 ENCOUNTER — HOSPITAL ENCOUNTER (OUTPATIENT)
Facility: HOSPITAL | Age: 27
Discharge: HOME | End: 2022-03-16
Attending: INTERNAL MEDICINE | Admitting: INTERNAL MEDICINE
Payer: MEDICARE

## 2022-03-16 VITALS
WEIGHT: 133.38 LBS | TEMPERATURE: 97 F | HEIGHT: 64 IN | HEART RATE: 80 BPM | SYSTOLIC BLOOD PRESSURE: 130 MMHG | RESPIRATION RATE: 17 BRPM | OXYGEN SATURATION: 100 % | BODY MASS INDEX: 22.77 KG/M2 | DIASTOLIC BLOOD PRESSURE: 58 MMHG

## 2022-03-16 DIAGNOSIS — Z76.82 ORGAN TRANSPLANT CANDIDATE: Primary | ICD-10-CM

## 2022-03-16 DIAGNOSIS — R19.7 DIARRHEA: ICD-10-CM

## 2022-03-16 LAB
GLUCOSE SERPL-MCNC: 255 MG/DL (ref 70–110)
POCT GLUCOSE: 255 MG/DL (ref 70–110)

## 2022-03-16 PROCEDURE — 43239 EGD BIOPSY SINGLE/MULTIPLE: CPT | Mod: TXP | Performed by: INTERNAL MEDICINE

## 2022-03-16 PROCEDURE — 45378 DIAGNOSTIC COLONOSCOPY: CPT | Mod: TXP | Performed by: INTERNAL MEDICINE

## 2022-03-16 PROCEDURE — 43239 EGD BIOPSY SINGLE/MULTIPLE: CPT | Mod: TXP,,, | Performed by: INTERNAL MEDICINE

## 2022-03-16 PROCEDURE — 37000008 HC ANESTHESIA 1ST 15 MINUTES: Mod: TXP | Performed by: INTERNAL MEDICINE

## 2022-03-16 PROCEDURE — E9220 PRA ENDO ANESTHESIA: HCPCS | Mod: TXP,,, | Performed by: NURSE ANESTHETIST, CERTIFIED REGISTERED

## 2022-03-16 PROCEDURE — 45378 PR COLONOSCOPY,DIAGNOSTIC: ICD-10-PCS | Mod: TXP,,, | Performed by: INTERNAL MEDICINE

## 2022-03-16 PROCEDURE — 27201012 HC FORCEPS, HOT/COLD, DISP: Mod: TXP | Performed by: INTERNAL MEDICINE

## 2022-03-16 PROCEDURE — E9220 PRA ENDO ANESTHESIA: ICD-10-PCS | Mod: TXP,,, | Performed by: NURSE ANESTHETIST, CERTIFIED REGISTERED

## 2022-03-16 PROCEDURE — 88305 TISSUE EXAM BY PATHOLOGIST: ICD-10-PCS | Mod: 26,TXP,, | Performed by: PATHOLOGY

## 2022-03-16 PROCEDURE — 25000003 PHARM REV CODE 250: Mod: TXP | Performed by: INTERNAL MEDICINE

## 2022-03-16 PROCEDURE — 37000009 HC ANESTHESIA EA ADD 15 MINS: Mod: TXP | Performed by: INTERNAL MEDICINE

## 2022-03-16 PROCEDURE — 25000003 PHARM REV CODE 250: Mod: TXP | Performed by: NURSE ANESTHETIST, CERTIFIED REGISTERED

## 2022-03-16 PROCEDURE — 88305 TISSUE EXAM BY PATHOLOGIST: CPT | Mod: TXP | Performed by: PATHOLOGY

## 2022-03-16 PROCEDURE — 63600175 PHARM REV CODE 636 W HCPCS: Mod: TXP | Performed by: NURSE ANESTHETIST, CERTIFIED REGISTERED

## 2022-03-16 PROCEDURE — 43239 PR EGD, FLEX, W/BIOPSY, SGL/MULTI: ICD-10-PCS | Mod: TXP,,, | Performed by: INTERNAL MEDICINE

## 2022-03-16 PROCEDURE — 88305 TISSUE EXAM BY PATHOLOGIST: CPT | Mod: 26,TXP,, | Performed by: PATHOLOGY

## 2022-03-16 PROCEDURE — 45378 DIAGNOSTIC COLONOSCOPY: CPT | Mod: TXP,,, | Performed by: INTERNAL MEDICINE

## 2022-03-16 RX ORDER — LIDOCAINE HCL/PF 100 MG/5ML
SYRINGE (ML) INTRAVENOUS
Status: DISCONTINUED | OUTPATIENT
Start: 2022-03-16 | End: 2022-03-16

## 2022-03-16 RX ORDER — PROPOFOL 10 MG/ML
VIAL (ML) INTRAVENOUS
Status: DISCONTINUED | OUTPATIENT
Start: 2022-03-16 | End: 2022-03-16

## 2022-03-16 RX ORDER — PROPOFOL 10 MG/ML
VIAL (ML) INTRAVENOUS CONTINUOUS PRN
Status: DISCONTINUED | OUTPATIENT
Start: 2022-03-16 | End: 2022-03-16

## 2022-03-16 RX ORDER — ASPIRIN 81 MG/1
81 TABLET ORAL DAILY
COMMUNITY
End: 2024-03-28

## 2022-03-16 RX ORDER — HYDRALAZINE HYDROCHLORIDE 25 MG/1
25 TABLET, FILM COATED ORAL 3 TIMES DAILY
COMMUNITY
End: 2022-03-21 | Stop reason: SDUPTHER

## 2022-03-16 RX ORDER — NIFEDIPINE 60 MG/1
60 TABLET, EXTENDED RELEASE ORAL 2 TIMES DAILY
Status: ON HOLD | COMMUNITY
End: 2022-11-08 | Stop reason: SDUPTHER

## 2022-03-16 RX ORDER — SODIUM CHLORIDE 0.9 % (FLUSH) 0.9 %
3 SYRINGE (ML) INJECTION
Status: CANCELLED | OUTPATIENT
Start: 2022-03-16

## 2022-03-16 RX ORDER — CARVEDILOL 25 MG/1
25 TABLET ORAL 2 TIMES DAILY WITH MEALS
COMMUNITY
End: 2022-11-16 | Stop reason: ALTCHOICE

## 2022-03-16 RX ORDER — SODIUM CHLORIDE 9 MG/ML
INJECTION, SOLUTION INTRAVENOUS CONTINUOUS
Status: DISCONTINUED | OUTPATIENT
Start: 2022-03-16 | End: 2022-03-16 | Stop reason: HOSPADM

## 2022-03-16 RX ADMIN — GLYCOPYRROLATE 0.2 MG: 0.2 INJECTION, SOLUTION INTRAMUSCULAR; INTRAVITREAL at 11:03

## 2022-03-16 RX ADMIN — Medication 100 MG: at 11:03

## 2022-03-16 RX ADMIN — SODIUM CHLORIDE: 0.9 INJECTION, SOLUTION INTRAVENOUS at 11:03

## 2022-03-16 RX ADMIN — PROPOFOL 50 MG: 10 INJECTION, EMULSION INTRAVENOUS at 11:03

## 2022-03-16 RX ADMIN — Medication 200 MCG/KG/MIN: at 11:03

## 2022-03-16 RX ADMIN — SODIUM CHLORIDE: 9 INJECTION, SOLUTION INTRAVENOUS at 11:03

## 2022-03-16 NOTE — H&P
Short Stay Endoscopy History and Physical    PCP - Tavo Bhatia MD  Referring Physician - Karin Ziegler MD  [address] [place] [address]    Procedure - Endoscopy/Colonoscopy  ASA - per anesthesia  Mallampati - per anesthesia  History of Anesthesia problems - no  Family history Anesthesia problems -  no   Plan of anesthesia - General    HPI  27 y.o. female who presents for    1. Organ transplant candidate    2. Diarrhea         ROS:  Constitutional: No fevers, chills, No weight loss  CV: No chest pain  Pulm: No cough, No shortness of breath  GI: see HPI    Medical History:  has a past medical history of Anemia, Chronic hypertension with exacerbation during pregnancy in second trimester (11/6/2020), Chronic kidney disease, Depression, Diabetes mellitus, Diabetic retinopathy, Diarrhea, Encounter for blood transfusion, Gastroparesis, Glaucoma, Hepatomegaly (4/29/2021), psychiatric care, Hyperlipidemia, Hypertension, Nephrotic syndrome, Nephrotic syndrome (3/4/2021), Palpitations, Poor fetal growth affecting management of mother in second trimester (10/15/2020), Restrictive lung disease, Retinal detachment, and Severe pre-eclampsia in second trimester (11/6/2020).    Surgical History:  has a past surgical history that includes AV fistula placement (Left, 4/7/2021); Percutaneous transluminal angioplasty of arteriovenous fistula (N/A, 8/11/2021); Fistulogram (N/A, 8/11/2021); Revision of arteriovenous fistula (Left, 12/10/2021); Repair of retinal detachment with vitrectomy (Right, 1/25/2022); removal implant, posterior segment, intraocular (Right, 2/1/2022); and Vitrectomy by pars plana approach (Right, 2/1/2022).    Family History: family history includes Diabetes in her brother; Heart disease in her father; Hypertension in her mother..    Social History:  reports that she has never smoked. She has never used smokeless tobacco. She reports that she does not drink alcohol and does not use  drugs.    Review of patient's allergies indicates:  No Known Allergies    Medications:   Medications Prior to Admission   Medication Sig Dispense Refill Last Dose    acetaminophen (TYLENOL) 325 MG tablet Take 1 tablet (325 mg total) by mouth every 6 (six) hours as needed for Pain.  0     aspirin (ECOTRIN) 81 MG EC tablet Take 1 tablet (81 mg total) by mouth every evening. 150 tablet 1     blood sugar diagnostic Strp To check BG 4 - 6 times daily, to use with insurance preferred meter 150 each 11     calcitRIOL (ROCALTROL) 0.5 MCG Cap Take 0.5 mcg by mouth once daily.       carvediloL (COREG) 25 MG tablet Take 1 tablet (25 mg total) by mouth 2 (two) times daily. 60 tablet 11     escitalopram oxalate (LEXAPRO) 20 MG tablet Take 1 tablet (20 mg total) by mouth once daily. (Patient taking differently: Take 20 mg by mouth daily as needed (depression/anxiety).) 30 tablet 11     ferrous sulfate 325 (65 FE) MG EC tablet Take 1 tablet (325 mg total) by mouth 2 (two) times daily. 180 tablet 2     flash glucose scanning reader (FREESTYLE MARCY 14 DAY READER) Misc 1 each by Misc.(Non-Drug; Combo Route) route once daily. 1 each 0     flash glucose sensor (FREESTYLE MARCY 14 DAY SENSOR) Kit 6 kits by Misc.(Non-Drug; Combo Route) route once daily. 6 kit 3     hydrALAZINE (APRESOLINE) 25 MG tablet Take 1 tablet (25 mg total) by mouth 2 (two) times a day. 60 tablet 3     HYDROcodone-acetaminophen (NORCO) 5-325 mg per tablet Take 1 tablet by mouth every 6 (six) hours as needed for Pain. 10 tablet 0     insulin detemir U-100 (LEVEMIR FLEXTOUCH) 100 unit/mL (3 mL) SubQ InPn pen 10 units subcutaneously in the morning and 12 units sq at night 15 mL 6     insulin lispro (HUMALOG KWIKPEN INSULIN) 100 unit/mL pen Inject 10 units into skin the skin 3 three times daily with meals 15 mL 6     lancets Misc To check BG 4 - 6 times daily, to use with insurance preferred meter 150 each 11     medroxyPROGESTERone (DEPO-PROVERA) 150  "mg/mL Syrg Inject 1 mL (150 mg total) into the muscle once. for 1 dose 1 mL 3     NIFEdipine (PROCARDIA-XL) 60 MG (OSM) 24 hr tablet Take 1 tablet (60 mg total) by mouth 2 (two) times a day. 60 tablet 11     pen needle, diabetic 32 gauge x 5/32" Ndle For three times daily injection 100 each 11     rosuvastatin (CRESTOR) 20 MG tablet Take 1 tablet (20 mg total) by mouth every evening. 90 tablet 3     sevelamer carbonate (RENVELA) 800 mg Tab TAKE 1 TABLET BY MOUTH THREE TIMES DAILY WITH MEALS 90 tablet 0     traZODone (DESYREL) 50 MG tablet Take 1 tablet (50 mg total) by mouth nightly as needed for Insomnia. 30 tablet 1        Physical Exam:    Vital Signs: There were no vitals filed for this visit.    Labs:  Lab Results   Component Value Date    WBC 9.36 01/19/2022    HGB 7.1 (L) 01/19/2022    HCT 22.3 (L) 01/19/2022     01/19/2022    CHOL 135 10/11/2021    TRIG 36 10/11/2021    HDL 71 10/11/2021    ALT 13 01/18/2022    AST 18 01/18/2022     01/19/2022    K 4.2 01/19/2022     01/19/2022    CREATININE 2.8 (H) 01/19/2022    BUN 14 01/19/2022    CO2 27 01/19/2022    TSH 3.786 08/04/2020    INR 1.0 05/21/2021    GLUF 185 (H) 08/05/2020    HGBA1C 10.9 (H) 10/23/2021       Risks and benefits of this endoscopic procedure, including but not limited to bleeding, inflammation, infection, perforation, and death, explained to the patient prior to procedure      Tavo Kwok MD  "

## 2022-03-16 NOTE — PROVATION PATIENT INSTRUCTIONS
Discharge Summary/Instructions after an Endoscopic Procedure  Patient Name: Isabela Read  Patient MRN: [id number]  Patient YOB: 1995 Wednesday, March 16, 2022  Tavo Kwok MD  Dear patient,  As a result of recent federal legislation (The Federal Cures Act), you may   receive lab or pathology results from your procedure in your [facility]   account before your physician is able to contact you. Your physician or   their representative will relay the results to you with their   recommendations at their soonest availability.  Thank you,  RESTRICTIONS:  During your procedure today, you received medications for sedation.  These   medications may affect your judgment, balance and coordination.  Therefore,   for 24 hours, you have the following restrictions:   - DO NOT drive a car, operate machinery, make legal/financial decisions,   sign important papers or drink alcohol.    ACTIVITY:  Today: no heavy lifting, straining or running due to procedural   sedation/anesthesia.  The following day: return to full activity including work.  DIET:  Eat and drink normally unless instructed otherwise.     TREATMENT FOR COMMON SIDE EFFECTS:  - Mild abdominal pain, nausea, belching, bloating or excessive gas:  rest,   eat lightly and use a heating pad.  - Sore Throat: treat with throat lozenges and/or gargle with warm salt   water.  - Because air was used during the procedure, expelling large amounts of air   from your rectum or belching is normal.  - If a bowel prep was taken, you may not have a bowel movement for 1-3 days.    This is normal.  SYMPTOMS TO WATCH FOR AND REPORT TO YOUR PHYSICIAN:  1. Abdominal pain or bloating, other than gas cramps.  2. Chest pain.  3. Back pain.  4. Signs of infection such as: chills or fever occurring within 24 hours   after the procedure.  5. Rectal bleeding, which would show as bright red, maroon, or black stools.   (A tablespoon of blood from the rectum is not serious, especially  if   hemorrhoids are present.)  6. Vomiting.  7. Weakness or dizziness.  GO DIRECTLY TO THE NEAREST EMERGENCY ROOM IF YOU HAVE ANY OF THE FOLLOWING:      Difficulty breathing              Chills and/or fever over 101 F   Persistent vomiting and/or vomiting blood   Severe abdominal pain   Severe chest pain   Black, tarry stools   Bleeding- more than one tablespoon   Any other symptom or condition that you feel may need urgent attention  Your doctor recommends these additional instructions:  If any biopsies were taken, your doctors clinic will contact you in 1 to 2   weeks with any results.  - Patient has a contact number available for emergencies.  The signs and   symptoms of potential delayed complications were discussed with the   patient.  Return to normal activities tomorrow.  Written discharge   instructions were provided to the patient.   - Resume previous diet.   - Continue present medications.   - Repeat colonoscopy at age 50 for surveillance.   - Discharge patient to home.  For questions, problems or results please call your physician - Tavo Kwok MD at Work:  [phone].  [facility], EMERGENCY ROOM PHONE NUMBER: [phone]  IF A COMPLICATION OR EMERGENCY SITUATION ARISES AND YOU ARE UNABLE TO REACH   YOUR PHYSICIAN - GO DIRECTLY TO THE EMERGENCY ROOM.  Tavo Kwok MD  3/16/2022 12:03:23 PM  This report has been verified and signed electronically.  Dear patient,  As a result of recent federal legislation (The Federal Cures Act), you may   receive lab or pathology results from your procedure in your [facility]   account before your physician is able to contact you. Your physician or   their representative will relay the results to you with their   recommendations at their soonest availability.  Thank you,  PROVATION

## 2022-03-16 NOTE — PLAN OF CARE
Pt ready for discharge. IV discontinued per order. Discharge instructions reviewed with pt. Pt verbalized understanding. All questions answered at this time.

## 2022-03-16 NOTE — ANESTHESIA PREPROCEDURE EVALUATION
03/16/2022  Isabela Read is a 27 y.o., female.      Pre-op Assessment    I have reviewed the Patient Summary Reports.    I have reviewed the NPO Status.   I have reviewed the Medications.     Review of Systems  Anesthesia Hx:  No problems with previous Anesthesia  History of prior surgery of interest to airway management or planning:   Social:  Non-Smoker, No Alcohol Use    Hematology/Oncology:  Hematology Normal   Oncology Normal     EENT/Dental:EENT/Dental Normal   Cardiovascular:   Exercise tolerance: good Hypertension, well controlled    Pulmonary:   Restrictive lung disease    Renal/:   Chronic Renal Disease, ESRD, Dialysis    Hepatic/GI:   Bowel Prep. Liver Disease,    Musculoskeletal:  Musculoskeletal Normal    Neurological:  Neurology Normal    Endocrine:   Diabetes, well controlled, type 2    Dermatological:  Skin Normal    Psych:   Psychiatric History          Physical Exam  General: Well nourished    Airway:  Mallampati: II   Mouth Opening: Normal  TM Distance: Normal  Tongue: Normal  Neck ROM: Normal ROM    Dental:  Intact        Anesthesia Plan  Type of Anesthesia, risks & benefits discussed:    Anesthesia Type: Gen Natural Airway, MAC  Intra-op Monitoring Plan: Standard ASA Monitors  Post Op Pain Control Plan: multimodal analgesia and IV/PO Opioids PRN  Induction:  IV  Informed Consent: Informed consent signed with the Patient and all parties understand the risks and agree with anesthesia plan.  All questions answered.   ASA Score: 3    Ready For Surgery From Anesthesia Perspective.     .

## 2022-03-16 NOTE — TRANSFER OF CARE
"Anesthesia Transfer of Care Note    Patient: Isabela Read    Procedure(s) Performed: Procedure(s) (LRB):  EGD (ESOPHAGOGASTRODUODENOSCOPY) (N/A)  COLONOSCOPY (N/A)    Patient location: GI    Anesthesia Type: general    Transport from OR: Transported from OR on room air with adequate spontaneous ventilation    Post pain: adequate analgesia    Post assessment: no apparent anesthetic complications and tolerated procedure well    Post vital signs: stable    Level of consciousness: sedated    Nausea/Vomiting: no nausea/vomiting    Complications: none    Transfer of care protocol was followed      Last vitals:   Visit Vitals  BP (!) 127/59 (BP Location: Left arm, Patient Position: Lying)   Pulse 81   Temp 36.2 °C (97.2 °F) (Temporal)   Resp 16   Ht 5' 4" (1.626 m)   Wt 60.5 kg (133 lb 6.1 oz)   SpO2 98%   Breastfeeding No   BMI 22.89 kg/m²     "

## 2022-03-16 NOTE — PROVATION PATIENT INSTRUCTIONS
Discharge Summary/Instructions after an Endoscopic Procedure  Patient Name: Isabela Read  Patient MRN: [id number]  Patient YOB: 1995 Wednesday, March 16, 2022  Tavo Kwok MD  Dear patient,  As a result of recent federal legislation (The Federal Cures Act), you may   receive lab or pathology results from your procedure in your [facility]   account before your physician is able to contact you. Your physician or   their representative will relay the results to you with their   recommendations at their soonest availability.  Thank you,  RESTRICTIONS:  During your procedure today, you received medications for sedation.  These   medications may affect your judgment, balance and coordination.  Therefore,   for 24 hours, you have the following restrictions:   - DO NOT drive a car, operate machinery, make legal/financial decisions,   sign important papers or drink alcohol.    ACTIVITY:  Today: no heavy lifting, straining or running due to procedural   sedation/anesthesia.  The following day: return to full activity including work.  DIET:  Eat and drink normally unless instructed otherwise.     TREATMENT FOR COMMON SIDE EFFECTS:  - Mild abdominal pain, nausea, belching, bloating or excessive gas:  rest,   eat lightly and use a heating pad.  - Sore Throat: treat with throat lozenges and/or gargle with warm salt   water.  - Because air was used during the procedure, expelling large amounts of air   from your rectum or belching is normal.  - If a bowel prep was taken, you may not have a bowel movement for 1-3 days.    This is normal.  SYMPTOMS TO WATCH FOR AND REPORT TO YOUR PHYSICIAN:  1. Abdominal pain or bloating, other than gas cramps.  2. Chest pain.  3. Back pain.  4. Signs of infection such as: chills or fever occurring within 24 hours   after the procedure.  5. Rectal bleeding, which would show as bright red, maroon, or black stools.   (A tablespoon of blood from the rectum is not serious, especially  if   hemorrhoids are present.)  6. Vomiting.  7. Weakness or dizziness.  GO DIRECTLY TO THE NEAREST EMERGENCY ROOM IF YOU HAVE ANY OF THE FOLLOWING:      Difficulty breathing              Chills and/or fever over 101 F   Persistent vomiting and/or vomiting blood   Severe abdominal pain   Severe chest pain   Black, tarry stools   Bleeding- more than one tablespoon   Any other symptom or condition that you feel may need urgent attention  Your doctor recommends these additional instructions:  If any biopsies were taken, your doctors clinic will contact you in 1 to 2   weeks with any results.  - Patient has a contact number available for emergencies.  The signs and   symptoms of potential delayed complications were discussed with the   patient.  Return to normal activities tomorrow.  Written discharge   instructions were provided to the patient.   - Resume previous diet.   - Continue present medications.   - Await pathology results.   - Discharge patient to home.  For questions, problems or results please call your physician - Tavo Kwok MD at Work:  [phone].  [facility], EMERGENCY ROOM PHONE NUMBER: [phone]  IF A COMPLICATION OR EMERGENCY SITUATION ARISES AND YOU ARE UNABLE TO REACH   YOUR PHYSICIAN - GO DIRECTLY TO THE EMERGENCY ROOM.  Tavo Kwok MD  3/16/2022 11:44:45 AM  This report has been verified and signed electronically.  Dear patient,  As a result of recent federal legislation (The Federal Cures Act), you may   receive lab or pathology results from your procedure in your [facility]   account before your physician is able to contact you. Your physician or   their representative will relay the results to you with their   recommendations at their soonest availability.  Thank you,  PROVATION

## 2022-03-17 NOTE — ANESTHESIA POSTPROCEDURE EVALUATION
Anesthesia Post Evaluation    Patient: Isabela Read    Procedure(s) Performed: Procedure(s) (LRB):  EGD (ESOPHAGOGASTRODUODENOSCOPY) (N/A)  COLONOSCOPY (N/A)    Final Anesthesia Type: general      Patient location during evaluation: PACU  Patient participation: Yes- Able to Participate  Level of consciousness: awake and alert  Post-procedure vital signs: reviewed and stable  Pain management: adequate  Airway patency: patent    PONV status at discharge: No PONV  Anesthetic complications: no      Cardiovascular status: blood pressure returned to baseline  Respiratory status: unassisted  Hydration status: euvolemic  Follow-up not needed.          Vitals Value Taken Time   /58 03/16/22 1240   Temp 36.2 °C (97.2 °F) 03/16/22 1210   Pulse 80 03/16/22 1240   Resp 17 03/16/22 1240   SpO2 100 % 03/16/22 1240         Event Time   Out of Recovery 12:47:30         Pain/Renny Score: Renny Score: 10 (3/16/2022 12:25 PM)

## 2022-03-21 ENCOUNTER — OFFICE VISIT (OUTPATIENT)
Dept: OPHTHALMOLOGY | Facility: CLINIC | Age: 27
End: 2022-03-21
Payer: MEDICARE

## 2022-03-21 ENCOUNTER — OFFICE VISIT (OUTPATIENT)
Dept: TRANSPLANT | Facility: CLINIC | Age: 27
End: 2022-03-21
Payer: MEDICARE

## 2022-03-21 ENCOUNTER — LAB VISIT (OUTPATIENT)
Dept: LAB | Facility: HOSPITAL | Age: 27
End: 2022-03-21
Attending: INTERNAL MEDICINE
Payer: MEDICARE

## 2022-03-21 VITALS
WEIGHT: 141.56 LBS | HEIGHT: 64 IN | SYSTOLIC BLOOD PRESSURE: 183 MMHG | HEART RATE: 113 BPM | DIASTOLIC BLOOD PRESSURE: 99 MMHG | BODY MASS INDEX: 24.17 KG/M2

## 2022-03-21 DIAGNOSIS — N18.6 ESRD (END STAGE RENAL DISEASE) ON DIALYSIS: ICD-10-CM

## 2022-03-21 DIAGNOSIS — N18.6 TYPE 1 DIABETES MELLITUS WITH END-STAGE RENAL DISEASE (ESRD): ICD-10-CM

## 2022-03-21 DIAGNOSIS — Z99.2 ESRD (END STAGE RENAL DISEASE) ON DIALYSIS: ICD-10-CM

## 2022-03-21 DIAGNOSIS — Z99.2 ESRD (END STAGE RENAL DISEASE) ON DIALYSIS: Primary | ICD-10-CM

## 2022-03-21 DIAGNOSIS — E10.22 TYPE 1 DIABETES MELLITUS WITH END-STAGE RENAL DISEASE (ESRD): ICD-10-CM

## 2022-03-21 DIAGNOSIS — N18.6 ESRD (END STAGE RENAL DISEASE): ICD-10-CM

## 2022-03-21 DIAGNOSIS — N18.6 ESRD (END STAGE RENAL DISEASE) ON DIALYSIS: Primary | ICD-10-CM

## 2022-03-21 DIAGNOSIS — E10.3521: ICD-10-CM

## 2022-03-21 DIAGNOSIS — I15.0 RENOVASCULAR HYPERTENSION: ICD-10-CM

## 2022-03-21 DIAGNOSIS — E10.3592 TYPE 1 DIABETES MELLITUS WITH PROLIFERATIVE RETINOPATHY OF LEFT EYE WITHOUT MACULAR EDEMA: Primary | ICD-10-CM

## 2022-03-21 PROCEDURE — 99024 POSTOP FOLLOW-UP VISIT: CPT | Mod: S$GLB,,, | Performed by: OPHTHALMOLOGY

## 2022-03-21 PROCEDURE — 99214 OFFICE O/P EST MOD 30 MIN: CPT | Mod: PBBFAC,TXP | Performed by: INTERNAL MEDICINE

## 2022-03-21 PROCEDURE — 3080F DIAST BP >= 90 MM HG: CPT | Mod: CPTII,TXP,, | Performed by: INTERNAL MEDICINE

## 2022-03-21 PROCEDURE — 1160F PR REVIEW ALL MEDS BY PRESCRIBER/CLIN PHARMACIST DOCUMENTED: ICD-10-PCS | Mod: CPTII,S$GLB,, | Performed by: OPHTHALMOLOGY

## 2022-03-21 PROCEDURE — 3080F PR MOST RECENT DIASTOLIC BLOOD PRESSURE >= 90 MM HG: ICD-10-PCS | Mod: CPTII,TXP,, | Performed by: INTERNAL MEDICINE

## 2022-03-21 PROCEDURE — 99024 PR POST-OP FOLLOW-UP VISIT: ICD-10-PCS | Mod: S$GLB,,, | Performed by: OPHTHALMOLOGY

## 2022-03-21 PROCEDURE — 3008F BODY MASS INDEX DOCD: CPT | Mod: CPTII,TXP,, | Performed by: INTERNAL MEDICINE

## 2022-03-21 PROCEDURE — 83520 IMMUNOASSAY QUANT NOS NONAB: CPT | Mod: TXP | Performed by: INTERNAL MEDICINE

## 2022-03-21 PROCEDURE — 3077F SYST BP >= 140 MM HG: CPT | Mod: CPTII,TXP,, | Performed by: INTERNAL MEDICINE

## 2022-03-21 PROCEDURE — 86334 PATHOLOGIST INTERPRETATION IFE: ICD-10-PCS | Mod: 26,TXP,, | Performed by: PATHOLOGY

## 2022-03-21 PROCEDURE — 3066F NEPHROPATHY DOC TX: CPT | Mod: CPTII,S$GLB,, | Performed by: OPHTHALMOLOGY

## 2022-03-21 PROCEDURE — 1159F MED LIST DOCD IN RCRD: CPT | Mod: CPTII,S$GLB,, | Performed by: OPHTHALMOLOGY

## 2022-03-21 PROCEDURE — 3066F PR DOCUMENTATION OF TREATMENT FOR NEPHROPATHY: ICD-10-PCS | Mod: CPTII,TXP,, | Performed by: INTERNAL MEDICINE

## 2022-03-21 PROCEDURE — 86334 IMMUNOFIX E-PHORESIS SERUM: CPT | Mod: 26,TXP,, | Performed by: PATHOLOGY

## 2022-03-21 PROCEDURE — 86334 IMMUNOFIX E-PHORESIS SERUM: CPT | Mod: TXP | Performed by: INTERNAL MEDICINE

## 2022-03-21 PROCEDURE — 99999 PR PBB SHADOW E&M-EST. PATIENT-LVL III: CPT | Mod: PBBFAC,,, | Performed by: OPHTHALMOLOGY

## 2022-03-21 PROCEDURE — 99999 PR PBB SHADOW E&M-EST. PATIENT-LVL IV: CPT | Mod: PBBFAC,TXP,, | Performed by: INTERNAL MEDICINE

## 2022-03-21 PROCEDURE — 1160F RVW MEDS BY RX/DR IN RCRD: CPT | Mod: CPTII,S$GLB,, | Performed by: OPHTHALMOLOGY

## 2022-03-21 PROCEDURE — 3066F NEPHROPATHY DOC TX: CPT | Mod: CPTII,TXP,, | Performed by: INTERNAL MEDICINE

## 2022-03-21 PROCEDURE — 99999 PR PBB SHADOW E&M-EST. PATIENT-LVL IV: ICD-10-PCS | Mod: PBBFAC,TXP,, | Performed by: INTERNAL MEDICINE

## 2022-03-21 PROCEDURE — 99213 OFFICE O/P EST LOW 20 MIN: CPT | Mod: PBBFAC,27 | Performed by: OPHTHALMOLOGY

## 2022-03-21 PROCEDURE — 1159F PR MEDICATION LIST DOCUMENTED IN MEDICAL RECORD: ICD-10-PCS | Mod: CPTII,S$GLB,, | Performed by: OPHTHALMOLOGY

## 2022-03-21 PROCEDURE — 99204 PR OFFICE/OUTPT VISIT, NEW, LEVL IV, 45-59 MIN: ICD-10-PCS | Mod: S$PBB,TXP,, | Performed by: INTERNAL MEDICINE

## 2022-03-21 PROCEDURE — 3008F PR BODY MASS INDEX (BMI) DOCUMENTED: ICD-10-PCS | Mod: CPTII,TXP,, | Performed by: INTERNAL MEDICINE

## 2022-03-21 PROCEDURE — 99204 OFFICE O/P NEW MOD 45 MIN: CPT | Mod: S$PBB,TXP,, | Performed by: INTERNAL MEDICINE

## 2022-03-21 PROCEDURE — 36415 COLL VENOUS BLD VENIPUNCTURE: CPT | Mod: TXP | Performed by: INTERNAL MEDICINE

## 2022-03-21 PROCEDURE — 3077F PR MOST RECENT SYSTOLIC BLOOD PRESSURE >= 140 MM HG: ICD-10-PCS | Mod: CPTII,TXP,, | Performed by: INTERNAL MEDICINE

## 2022-03-21 PROCEDURE — 99999 PR PBB SHADOW E&M-EST. PATIENT-LVL III: ICD-10-PCS | Mod: PBBFAC,,, | Performed by: OPHTHALMOLOGY

## 2022-03-21 PROCEDURE — 3066F PR DOCUMENTATION OF TREATMENT FOR NEPHROPATHY: ICD-10-PCS | Mod: CPTII,S$GLB,, | Performed by: OPHTHALMOLOGY

## 2022-03-21 RX ORDER — HYDRALAZINE HYDROCHLORIDE 25 MG/1
50 TABLET, FILM COATED ORAL EVERY 8 HOURS
Qty: 90 TABLET | Refills: 3 | Status: ON HOLD | OUTPATIENT
Start: 2022-03-21 | End: 2022-05-09 | Stop reason: SDUPTHER

## 2022-03-21 NOTE — PATIENT INSTRUCTIONS
"You have too much extra fluid on you.  Please adhere to a low sodium diet (no more than 1.5 grams of sodium in 24h).  3.   Follow fluid restriction of  2. no more than 1.5 liters in 24 hours..   4. Please have your HD doctor to remove more fluid from you and to achieve a "DRY" weight.  5. Please have tests ordered today to rule out "AMYLOIDOSIS".  6. Your BP is too high today. Please increase your hydralazine to 50 mg three times a day.  7. Follow up in 6 weeks with labs.  "

## 2022-03-21 NOTE — PROGRESS NOTES
Subjective:       Patient ID: Isabela Read is a 27 y.o. female      Chief Complaint   Patient presents with    Post-op Evaluation     History of Present Illness  HPI     DLS  03/03/2022 by Dr. ARMIN Montaño MD      Pt here for PRE-OP Type  DM OD w/ PDR and TRD involving Macula.     's this morning .     Pt feels that vision is improving OD.  Can see images, fingers etc.  No   pain.  All normal with vision OS    Hemoglobin A1C       Date                     Value               Ref Range             Status                10/23/2021               10.9 (H)            4.0 - 5.6 %           Final                       09/17/2021               10.2 (H)            4.0 - 5.6 %           Final                      04/06/2021               10.9 (H)            4.0 - 5.6 %           Final                        -eye pain  -headaches  -blurred Va  -diplopia  -flashes/floaters    Gtts: None                 POHx:   1. 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 (History)          Assessment/Plan:     1. 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  Has incompetent lens/iris diaphragm and prev SO migrated into AC causing extreme high IOP  Recommend PPL and aphakia with SO.  Eye model used  Discussed will need multiple surgeries even in best scenario since will need SOR and poss secondary IOL vs CL  Pt understands and agrees.  Pt wishes to wait until after April 11 because of social engagement.      RBA discussed in detail, inc  surgical failure, need for more surgery, loss of vision/eye, no recovery of vision, surgical failure, bleeding, infection, high eye pressure (glaucoma), etc.  Pt wishes to proceed.      Follow up in about 3 weeks (around 4/11/2022), or if symptoms worsen or fail to improve, for Surgery.       25 ga PPV, PPL, SO  GA

## 2022-03-21 NOTE — PROGRESS NOTES
[facility] Follow up.      Attending Physician: Florentin Esquivel MD.  The patient's last visit with me was on Visit date not found.         HPI.  This is a 28 yo AAW with history of type 1 DM and HTN< that developed ESRD after years and as a complication. She has been on HD using a left arm AV fistula, on Tuesday-Thursday and Sat.  She comes referred for evalutaion of possible amyloidosis.  Patient complains of dyspnea with minimal activities. Class III-IV symptoms. She denies edema but she knows she is above her DRY weight. Denies chest pain.     Review of Systems   Constitutional: Positive for fatigue. Negative for activity change, appetite change, chills, diaphoresis and fever.   HENT: Negative for nasal congestion, rhinorrhea and sore throat.    Eyes: Negative for visual disturbance.   Respiratory: Positive for cough and shortness of breath. Negative for apnea and chest tightness.    Cardiovascular: Negative for chest pain and leg swelling.   Gastrointestinal: Positive for abdominal distention. Negative for abdominal pain, diarrhea, nausea and vomiting.   Genitourinary: Negative for difficulty urinating, dysuria and hematuria.   Integumentary:  Negative for rash.   Neurological: Negative for seizures, syncope and light-headedness.   Psychiatric/Behavioral: Negative for agitation and hallucinations.        Past Medical History:   Diagnosis Date    Anemia     Chronic hypertension with exacerbation during pregnancy in second trimester 11/6/2020    Current regimen (11/6/20):  - Carvedilol 12.5 mg BID - Nifedipine 30 mg daily Baseline CKD + proteinuria    Chronic kidney disease     Depression     Diabetes mellitus     Diabetic retinopathy     Diarrhea     Encounter for blood transfusion     Gastroparesis     Glaucoma     Hepatomegaly 4/29/2021    Hx of psychiatric care      Hyperlipidemia     Hypertension     Nephrotic syndrome     Nephrotic syndrome 3/4/2021    Palpitations     Poor fetal growth affecting management of mother in second trimester 10/15/2020    Restrictive lung disease     Retinal detachment     Severe pre-eclampsia in second trimester 11/6/2020        Past Surgical History:   Procedure Laterality Date    AV FISTULA PLACEMENT Left 4/7/2021    Procedure: CREATION, AV FISTULA;  Surgeon: Roberto Ryan MD;  Location: [facility] OR 2ND FLR;  Service: Peripheral Vascular;  Laterality: Left;    COLONOSCOPY N/A 3/16/2022    Procedure: COLONOSCOPY;  Surgeon: Tavo Kwok MD;  Location: [facility] (4TH FLR);  Service: Endoscopy;  Laterality: N/A;  Questionable history of delayed gastric emptying longstanding diabetes now on eating pre transplant workup for history of nausea vomiting which seems to have improved with dialysis also chronic diarrhea and history of anemia pre transplant workup for kidney transplant. 3    ESOPHAGOGASTRODUODENOSCOPY N/A 3/16/2022    Procedure: EGD (ESOPHAGOGASTRODUODENOSCOPY);  Surgeon: Tavo Kwok MD;  Location: [facility] (4TH FLR);  Service: Endoscopy;  Laterality: N/A;  Questionable history of delayed gastric emptying longstanding diabetes now on eating pre transplant workup for history of nausea vomiting which seems to have improved with dialysis also chronic diarrhea and history of anemia pre transplant workup for    FISTULOGRAM N/A 8/11/2021    Procedure: Fistulogram;  Surgeon: Roberto Ryan MD;  Location: [facility] CATH LAB;  Service: Cardiology;  Laterality: N/A;    PERCUTANEOUS TRANSLUMINAL ANGIOPLASTY OF ARTERIOVENOUS FISTULA N/A 8/11/2021    Procedure: PTA, AV FISTULA;  Surgeon: Roberto Ryan MD;  Location: [facility] CATH LAB;  Service: Cardiology;  Laterality: N/A;    REMOVAL IMPLANT, POSTERIOR SEGMENT, INTRAOCULAR Right 2/1/2022    Procedure: REMOVAL IMPLANT, POSTERIOR SEGMENT, INTRAOCULAR;  Surgeon: Maximilian  RODOLFO Montaño MD;  Location: [facility] OR 1ST FLR;  Service: Ophthalmology;  Laterality: Right;    REPAIR OF RETINAL DETACHMENT WITH VITRECTOMY Right 1/25/2022    Procedure: REPAIR, RETINAL DETACHMENT, WITH VITRECTOMY, MEMBRANE PEEL, LASER, INJECTION OF GAS VS OIL;  Surgeon: Maximilian Montaño MD;  Location: [facility] OR 1ST FLR;  Service: Ophthalmology;  Laterality: Right;    REVISION OF ARTERIOVENOUS FISTULA Left 12/10/2021    Procedure: REVISION, AV FISTULA with BVT;  Surgeon: Roberto Ryan MD;  Location: [facility] OR 2ND FLR;  Service: Peripheral Vascular;  Laterality: Left;    VITRECTOMY BY PARS PLANA APPROACH Right 2/1/2022    Procedure: VITRECTOMY, PARS PLANA APPROACH;  Surgeon: Maximilian Montaño MD;  Location: [facility] OR 1ST FLR;  Service: Ophthalmology;  Laterality: Right;        Family History   Problem Relation Age of Onset    Hypertension Mother     Heart disease Father     Diabetes Brother     Celiac disease Neg Hx     Cirrhosis Neg Hx     Colon cancer Neg Hx     Colon polyps Neg Hx     Crohn's disease Neg Hx     Inflammatory bowel disease Neg Hx     Liver cancer Neg Hx     Liver disease Neg Hx     Rectal cancer Neg Hx     Stomach cancer Neg Hx     Ulcerative colitis Neg Hx     Esophageal cancer Neg Hx     Hemochromatosis Neg Hx     Pancreatic cancer Neg Hx     Kidney cancer Neg Hx     Bladder Cancer Neg Hx     Uterine cancer Neg Hx     Ovarian cancer Neg Hx         Review of patient's allergies indicates:  No Known Allergies     Current Outpatient Medications   Medication Instructions    acetaminophen (TYLENOL) 325 mg, Oral, Every 6 hours PRN    aspirin (ECOTRIN) 81 mg, Oral, Daily    blood sugar diagnostic Strp To check BG 4 - 6 times daily, to use with insurance preferred meter    calcitRIOL (ROCALTROL) 0.5 mcg, Oral, Daily    carvediloL (COREG) 25 mg, Oral, 2 times daily with meals    EScitalopram oxalate (LEXAPRO) 20 mg, Oral, Daily    ferrous sulfate 325 mg, Oral, 2 times  "daily    flash glucose scanning reader (FREESTYLE MARCY 14 DAY READER) Misc 1 each, Misc.(Non-Drug; Combo Route), Daily    flash glucose sensor (FREESTYLE MARCY 14 DAY SENSOR) Kit 6 kits, Misc.(Non-Drug; Combo Route), Daily    hydrALAZINE (APRESOLINE) 25 mg, Oral, 3 times daily    HYDROcodone-acetaminophen (NORCO) 5-325 mg per tablet 1 tablet, Oral, Every 6 hours PRN    insulin detemir U-100 (LEVEMIR FLEXTOUCH) 100 unit/mL (3 mL) SubQ InPn pen 10 units subcutaneously in the morning and 12 units sq at night    insulin lispro (HUMALOG KWIKPEN INSULIN) 100 unit/mL pen Inject 10 units into skin the skin 3 three times daily with meals    lancets Misc To check BG 4 - 6 times daily, to use with insurance preferred meter    medroxyPROGESTERone (DEPO-PROVERA) 150 mg, Intramuscular, Once    NIFEdipine (PROCARDIA-XL) 60 mg, Oral, Daily    pen needle, diabetic 32 gauge x 5/32" Ndle For three times daily injection    rosuvastatin (CRESTOR) 20 mg, Oral, Nightly    sevelamer carbonate (RENVELA) 800 mg Tab TAKE 1 TABLET BY MOUTH THREE TIMES DAILY WITH MEALS    traZODone (DESYREL) 50 mg, Oral, Nightly PRN        There were no vitals filed for this visit.     Wt Readings from Last 3 Encounters:   03/16/22 60.5 kg (133 lb 6.1 oz)   03/10/22 64.9 kg (143 lb 1.3 oz)   03/10/22 64 kg (141 lb 1.5 oz)     Temp Readings from Last 3 Encounters:   03/16/22 97.2 °F (36.2 °C) (Temporal)   03/10/22 98.7 °F (37.1 °C) (Oral)   02/01/22 98.1 °F (36.7 °C) (Skin)     BP Readings from Last 3 Encounters:   03/16/22 (!) 130/58   03/10/22 129/84   03/10/22 122/72     Pulse Readings from Last 3 Encounters:   03/16/22 80   03/10/22 68   03/10/22 91        There is no height or weight on file to calculate BMI. Estimated body surface area is 1.65 meters squared as calculated from the following:    Height as of 3/16/22: 5' 4" (1.626 m).    Weight as of 3/16/22: 60.5 kg (133 lb 6.1 oz).     Physical Exam  Constitutional:       Appearance: She is " well-developed.   HENT:      Head: Normocephalic and atraumatic.      Right Ear: External ear normal.      Left Ear: External ear normal.   Eyes:      Conjunctiva/sclera: Conjunctivae normal.      Pupils: Pupils are equal, round, and reactive to light.   Neck:      Vascular: Hepatojugular reflux and JVD present.      Comments: JVP 18 cmH20, visible in left IJ examination.   Cardiovascular:      Rate and Rhythm: Normal rate and regular rhythm.      Pulses: Intact distal pulses.      Heart sounds: S1 normal and S2 normal. No murmur heard.    No friction rub. No gallop.   Pulmonary:      Effort: Pulmonary effort is normal.      Breath sounds: Normal breath sounds.   Abdominal:      General: Bowel sounds are normal. There is no distension.      Palpations: Abdomen is soft.      Tenderness: There is no abdominal tenderness. There is no guarding or rebound.   Musculoskeletal:      Cervical back: Normal range of motion and neck supple.      Right lower leg: Edema present.      Left lower leg: Edema present.   Neurological:      Mental Status: She is alert and oriented to person, place, and time.          Lab Results   Component Value Date    BNP 1,756 (H) 01/17/2022     01/19/2022    K 4.3 03/16/2022    MG 2.6 01/19/2022     01/19/2022    CO2 27 01/19/2022    BUN 14 01/19/2022    CREATININE 2.8 (H) 01/19/2022     (H) 01/19/2022    HGBA1C 10.9 (H) 10/23/2021    AST 18 01/18/2022    ALT 13 01/18/2022    ALBUMIN 2.4 (L) 01/19/2022    PROT 6.1 01/18/2022    BILITOT 0.8 01/18/2022    WBC 9.36 01/19/2022    HGB 7.1 (L) 01/19/2022    HCT 22.3 (L) 01/19/2022    HCT 33 (L) 01/17/2022     01/19/2022    INR 1.0 05/21/2021     10/21/2019    TSH 3.786 08/04/2020    CHOL 135 10/11/2021    HDL 71 10/11/2021    LDLCALC 56.8 (L) 10/11/2021    TRIG 36 10/11/2021         Results for orders placed during the hospital encounter of 05/19/21    Echo Color Flow Doppler? Yes    Interpretation Summary  · The  estimated ejection fraction is 65%.  · The left ventricle is normal in size with concentric hypertrophy and normal systolic function.Speckled Myocardium and apical sparing apical pattern concerning for cardiac amyloid. Clinical Correlation is advised.  · The left ventricular global longitudinal strain is -11%.  · Normal left ventricular diastolic function.  · Normal right ventricular size with normal right ventricular systolic function.  · Mild left atrial enlargement.  · Mild-to-moderate mitral regurgitation.  · Mild to moderate tricuspid regurgitation.  · Mild pulmonic regurgitation.  · There is pulmonary hypertension.  · The estimated PA systolic pressure is 44 mmHg.  · Elevated central venous pressure (15 mmHg).        Results for orders placed during the hospital encounter of 08/11/21    Cardiac catheterization    Narrative  Procedure performed in the Invasive Lab  - See Procedure Log link below for nursing documentation  - See OpNote on Surgeries Tab for physician findings         Assessment and Plan:  ESRD (end stage renal disease) on dialysis  -     PYP ATTR related Amyloidosis (Cupid Only); Future  -     Echo; Future; Expected date: 03/21/2022  -     Protein Electrophoresis, Random Urine  -     IMMUNOGLOBULIN FREE LT CHAINS BLOOD; Future; Expected date: 03/21/2022  -     IMMUNOFIXATION ELECTROPHORESIS, SERUM; Future; Expected date: 03/21/2022  -     CBC Auto Differential; Future; Expected date: 03/21/2022  -     Comprehensive Metabolic Panel; Future; Expected date: 03/21/2022  -     BNP; Future; Expected date: 03/21/2022    ESRD (end stage renal disease)    Renovascular hypertension    Type 1 diabetes mellitus with end-stage renal disease (ESRD)    Other orders  -     hydrALAZINE (APRESOLINE) 25 MG tablet; Take 2 tablets (50 mg total) by mouth every 8 (eight) hours.  Dispense: 90 tablet; Refill: 3          1. Chronic diastolic HF, NYHA class III-IV, stage C.  Etiology: hypertensive, +/-  infiltrative.  Devices: none  Medications: hydralazine, carvedilol.  Hemodynamic status: warm, severely hypertensive, severely hypervolemic.  Plan:  -patient to adhere to a fluid restriction of NMT 1.5 liters/24h.  -patient to adhere to  low sodium diet, NMT 1.5 grams of sodium in a day.  -patient needs aggressive decongestion through HD, till real DRY weight is achieved and maintained.   -work up for amyloidosis was sent (AL and ATTR amyloidosis)    -Follow up in 4-6 weeks with labs, result of echo and PYP scan.

## 2022-03-21 NOTE — LETTER
March 21, 2022        Oswald Kruger  [address]  [facility] [address]  Phone: [phone]  Fax: [phone]             [id number]  [address]  [facility] [address]  Phone: [phone]   Patient: Isabela Read   MR Number: [id number]   YOB: 1995   Date of Visit: 3/21/2022       Dear Dr. Oswald Kruger    Thank you for referring Isabela Read to me for evaluation. Attached you will find relevant portions of my assessment and plan of care.    If you have questions, please do not hesitate to call me. I look forward to following Isabela Read along with you.    Sincerely,    Florentin Esquivel MD    Enclosure    If you would like to receive this communication electronically, please contact [email or web address] or [phone] to request Order[employer] Link access.    Order[employer] Link is a tool which provides read-only access to select patient information with whom you have a relationship. Its easy to use and provides real time access to review your patients record including encounter summaries, notes, results, and demographic information.    If you feel you have received this communication in error or would no longer like to receive these types of communications, please e-mail [email or web address]

## 2022-03-22 LAB
INTERPRETATION SERPL IFE-IMP: NORMAL
KAPPA LC SER QL IA: 20.05 MG/DL (ref 0.33–1.94)
KAPPA LC/LAMBDA SER IA: 1.57 (ref 0.26–1.65)
LAMBDA LC SER QL IA: 12.79 MG/DL (ref 0.57–2.63)
PATHOLOGIST INTERPRETATION IFE: NORMAL

## 2022-03-23 LAB
FINAL PATHOLOGIC DIAGNOSIS: NORMAL
Lab: NORMAL

## 2022-03-24 ENCOUNTER — TELEPHONE (OUTPATIENT)
Dept: OPHTHALMOLOGY | Facility: CLINIC | Age: 27
End: 2022-03-24
Payer: MEDICARE

## 2022-03-24 NOTE — TELEPHONE ENCOUNTER
Called to schedule patient for surgery with  for possibly April 6th. Mom states that her birthday. I offered the next avail surgery date, but patient has dialysis on Tuesday which will be her post op day. I will discuss with  which do he wants to schedule patient and also she would post op appointment to be at [facility].

## 2022-03-29 ENCOUNTER — LAB VISIT (OUTPATIENT)
Dept: LAB | Facility: OTHER | Age: 27
End: 2022-03-29
Attending: OBSTETRICS & GYNECOLOGY
Payer: MEDICARE

## 2022-03-29 ENCOUNTER — OFFICE VISIT (OUTPATIENT)
Dept: OBSTETRICS AND GYNECOLOGY | Facility: CLINIC | Age: 27
End: 2022-03-29
Payer: MEDICARE

## 2022-03-29 VITALS
BODY MASS INDEX: 24.18 KG/M2 | SYSTOLIC BLOOD PRESSURE: 122 MMHG | DIASTOLIC BLOOD PRESSURE: 84 MMHG | WEIGHT: 140.88 LBS

## 2022-03-29 DIAGNOSIS — N94.89 OTHER SPECIFIED CONDITIONS ASSOCIATED WITH FEMALE GENITAL ORGANS AND MENSTRUAL CYCLE: ICD-10-CM

## 2022-03-29 DIAGNOSIS — R55 VASOMOTOR INSTABILITY: ICD-10-CM

## 2022-03-29 DIAGNOSIS — Z30.9 ENCOUNTER FOR CONTRACEPTIVE MANAGEMENT, UNSPECIFIED TYPE: ICD-10-CM

## 2022-03-29 DIAGNOSIS — Z30.9 ENCOUNTER FOR CONTRACEPTIVE MANAGEMENT, UNSPECIFIED TYPE: Primary | ICD-10-CM

## 2022-03-29 LAB — HCG INTACT+B SERPL-ACNC: <1.2 MIU/ML

## 2022-03-29 PROCEDURE — 99999 PR PBB SHADOW E&M-EST. PATIENT-LVL III: CPT | Mod: PBBFAC,,, | Performed by: OBSTETRICS & GYNECOLOGY

## 2022-03-29 PROCEDURE — 3079F DIAST BP 80-89 MM HG: CPT | Mod: CPTII,S$GLB,, | Performed by: OBSTETRICS & GYNECOLOGY

## 2022-03-29 PROCEDURE — 99213 PR OFFICE/OUTPT VISIT, EST, LEVL III, 20-29 MIN: ICD-10-PCS | Mod: S$GLB,,, | Performed by: OBSTETRICS & GYNECOLOGY

## 2022-03-29 PROCEDURE — 1159F MED LIST DOCD IN RCRD: CPT | Mod: CPTII,S$GLB,, | Performed by: OBSTETRICS & GYNECOLOGY

## 2022-03-29 PROCEDURE — 3008F PR BODY MASS INDEX (BMI) DOCUMENTED: ICD-10-PCS | Mod: CPTII,S$GLB,, | Performed by: OBSTETRICS & GYNECOLOGY

## 2022-03-29 PROCEDURE — 84702 CHORIONIC GONADOTROPIN TEST: CPT | Mod: NTX | Performed by: OBSTETRICS & GYNECOLOGY

## 2022-03-29 PROCEDURE — 36415 COLL VENOUS BLD VENIPUNCTURE: CPT | Mod: NTX | Performed by: OBSTETRICS & GYNECOLOGY

## 2022-03-29 PROCEDURE — 1160F PR REVIEW ALL MEDS BY PRESCRIBER/CLIN PHARMACIST DOCUMENTED: ICD-10-PCS | Mod: CPTII,S$GLB,, | Performed by: OBSTETRICS & GYNECOLOGY

## 2022-03-29 PROCEDURE — 3074F PR MOST RECENT SYSTOLIC BLOOD PRESSURE < 130 MM HG: ICD-10-PCS | Mod: CPTII,S$GLB,, | Performed by: OBSTETRICS & GYNECOLOGY

## 2022-03-29 PROCEDURE — 99999 PR PBB SHADOW E&M-EST. PATIENT-LVL III: ICD-10-PCS | Mod: PBBFAC,,, | Performed by: OBSTETRICS & GYNECOLOGY

## 2022-03-29 PROCEDURE — 1159F PR MEDICATION LIST DOCUMENTED IN MEDICAL RECORD: ICD-10-PCS | Mod: CPTII,S$GLB,, | Performed by: OBSTETRICS & GYNECOLOGY

## 2022-03-29 PROCEDURE — 3008F BODY MASS INDEX DOCD: CPT | Mod: CPTII,S$GLB,, | Performed by: OBSTETRICS & GYNECOLOGY

## 2022-03-29 PROCEDURE — 3066F NEPHROPATHY DOC TX: CPT | Mod: CPTII,S$GLB,, | Performed by: OBSTETRICS & GYNECOLOGY

## 2022-03-29 PROCEDURE — 1160F RVW MEDS BY RX/DR IN RCRD: CPT | Mod: CPTII,S$GLB,, | Performed by: OBSTETRICS & GYNECOLOGY

## 2022-03-29 PROCEDURE — 3079F PR MOST RECENT DIASTOLIC BLOOD PRESSURE 80-89 MM HG: ICD-10-PCS | Mod: CPTII,S$GLB,, | Performed by: OBSTETRICS & GYNECOLOGY

## 2022-03-29 PROCEDURE — 3074F SYST BP LT 130 MM HG: CPT | Mod: CPTII,S$GLB,, | Performed by: OBSTETRICS & GYNECOLOGY

## 2022-03-29 PROCEDURE — 99213 OFFICE O/P EST LOW 20 MIN: CPT | Mod: S$GLB,,, | Performed by: OBSTETRICS & GYNECOLOGY

## 2022-03-29 PROCEDURE — 3066F PR DOCUMENTATION OF TREATMENT FOR NEPHROPATHY: ICD-10-PCS | Mod: CPTII,S$GLB,, | Performed by: OBSTETRICS & GYNECOLOGY

## 2022-03-29 RX ORDER — VENLAFAXINE HYDROCHLORIDE 37.5 MG/1
37.5 CAPSULE, EXTENDED RELEASE ORAL DAILY
Qty: 30 CAPSULE | Refills: 11 | Status: ON HOLD | OUTPATIENT
Start: 2022-03-29 | End: 2022-11-08 | Stop reason: SDUPTHER

## 2022-03-29 NOTE — PROGRESS NOTES
Subjective:       Patient ID: Isabela Read is a 27 y.o. female.    Chief Complaint:  Contraception (Depo)    History of Present Illness  HPI  28 y/o  presents for contraception management and evaluation of sweats. Unable to give urine due to being on dialysis, so b-hcg done.  Reports having sweats, mostly with eating, now increasing in frequency. Reports that she has to eat in front of a fan/heater due to sweats. Reports night sweats. Reports chills at times, attributes to her anemia. this has been occurring over a year since before her pregnancy and well before starting Depo Provera. She has had one shot of Depo Provera. Patient desires to continue Depo Provera.     She has been on an antidepressant in the past for anxiety but no longer taking. She was on trazodone in the past for sleep but not taking regularly. Reports depression is improved. Denies SI/HI.     GYN & OB History  No LMP recorded. Patient has had an injection.   Date of Last Pap: No result found    OB History    Para Term  AB Living   1 1   1       SAB IAB Ectopic Multiple Live Births         0        # Outcome Date GA Lbr Chalo/2nd Weight Sex Delivery Anes PTL Lv   1  20 23w2d  0.255 kg (9 oz) F Vag-Breech EPI  FD       Review of Systems  Review of Systems   Constitutional: Positive for diaphoresis. Negative for chills, fatigue and fever.   Respiratory: Negative for cough and shortness of breath.    Cardiovascular: Negative for chest pain and palpitations.   Gastrointestinal: Negative for abdominal pain, constipation, diarrhea, nausea and vomiting.   Endocrine: Positive for hot flashes.   Genitourinary: Negative for dysmenorrhea, dyspareunia, dysuria, menorrhagia, menstrual problem, pelvic pain, vaginal bleeding, vaginal discharge, vaginal pain, vaginal dryness and vaginal odor.   Neurological: Negative for headaches.   Psychiatric/Behavioral: Negative for depression. The patient is not nervous/anxious.   normal appearance , no deformities           Objective:    Physical Exam:   Constitutional: She is oriented to person, place, and time. She appears well-developed and well-nourished. No distress.    HENT:   Head: Normocephalic and atraumatic.    Eyes: EOM are normal.      Pulmonary/Chest: Effort normal.                  Musculoskeletal: Normal range of motion and moves all extremeties.       Neurological: She is alert and oriented to person, place, and time.    Skin: No rash noted.    Psychiatric: She has a normal mood and affect. Her behavior is normal. Judgment and thought content normal.          Assessment:        1. Encounter for contraceptive management, unspecified type    2. Other specified conditions associated with female genital organs and menstrual cycle     3. Vasomotor instability              Plan:      Isabela was seen today for contraception.    Diagnoses and all orders for this visit:    Encounter for contraceptive management, unspecified type  Patient unable to provide urine sample due to being on dialysis  B-hcg ordered and if negative can proceed with Depo Provera  -     HCG, Quantitative; Future    Other specified conditions associated with female genital organs and menstrual cycle   -     HCG, Quantitative; Future    Vasomotor instability  Patient with long history of diaphoretic episodes/questionable vasomotor episodes.  She states she has discussed this with her other doctors and they do not believe it has to do with any of her other medical conditions. Counseled patient that given her age it is unlikely due to estrogen deficiency. It occurred prior to ever being on Depo-Provera for pregnancy.  Counseled patient on usual treatment options of vasomotor symptoms in the setting of perimenopause or menopause.  Counseled that given labile HTN, would avoid systemic estrogen therapy.  Counseled on low-dose SSRI/SNRI and she is willing to try Effexor given she is not on any other psychiatric medications at this time.  Strict  precautions reviewed.  Recommend follow-up in about 4-6 weeks.    Other orders  -     venlafaxine (EFFEXOR XR) 37.5 MG 24 hr capsule; Take 1 capsule (37.5 mg total) by mouth once daily.    20 minutes of total time spent on the encounter, which includes face to face time and non-face to face time preparing to see the patient (eg, review of tests), Obtaining and/or reviewing separately obtained history, Documenting clinical information in the electronic or other health record, Independently interpreting results (not separately reported) and communicating results to the patient/family/caregiver, or Care coordination (not separately reported).      Orders Placed This Encounter   Procedures    HCG, Quantitative       No follow-ups on file.

## 2022-03-31 ENCOUNTER — TELEPHONE (OUTPATIENT)
Dept: CARDIOLOGY | Facility: HOSPITAL | Age: 27
End: 2022-03-31
Payer: MEDICARE

## 2022-04-04 ENCOUNTER — HOSPITAL ENCOUNTER (OUTPATIENT)
Dept: CARDIOLOGY | Facility: HOSPITAL | Age: 27
Discharge: HOME | End: 2022-04-04
Attending: INTERNAL MEDICINE
Payer: MEDICARE

## 2022-04-04 DIAGNOSIS — Z99.2 ESRD (END STAGE RENAL DISEASE) ON DIALYSIS: ICD-10-CM

## 2022-04-04 DIAGNOSIS — N18.6 ESRD (END STAGE RENAL DISEASE) ON DIALYSIS: ICD-10-CM

## 2022-04-04 PROCEDURE — 78803 RP LOCLZJ TUM SPECT 1 AREA: CPT | Mod: TXP

## 2022-04-04 PROCEDURE — 78803 RP LOCLZJ TUM SPECT 1 AREA: CPT | Mod: 26,NTX,, | Performed by: INTERNAL MEDICINE

## 2022-04-04 PROCEDURE — 78803 CV PYP ATTR W SPECT (CUPID ONLY): ICD-10-PCS | Mod: 26,NTX,, | Performed by: INTERNAL MEDICINE

## 2022-04-13 ENCOUNTER — PATIENT OUTREACH (OUTPATIENT)
Dept: ADMINISTRATIVE | Facility: OTHER | Age: 27
End: 2022-04-13
Payer: MEDICARE

## 2022-04-13 NOTE — PROGRESS NOTES
Requested updates within Care Everywhere.  Patient's chart was reviewed for overdue NUHA topics.  Health maintenance:updated  Immunizations:reconciled   Legacy:   Media:  Orders placed:  Tasked appts:  Labs Linked:  Upcoming appt:

## 2022-04-14 NOTE — SUBJECTIVE & OBJECTIVE
04/14/2022  Saad Soria Moy Rodriguez MD;  Location: [facility];  Service: Endoscopy;  Laterality: N/A;    COLONOSCOPY N/A 12/10/2021    Procedure: COLONOSCOPY;  Surgeon: Moy Rodriguez MD;  Location: [facility];  Interval Hx:     Patient with multiple elevated blood pressures yesterday requiring to be pushed on with IV Labetalol 20/20/40. Procardia increased to 60XL. Patient remained asymptomatic. Feels well this morning & would like to go home.      Objective:     Vital Signs (Most Recent):  Temp: 98.5 °F (36.9 °C) (11/11/20 0557)  Pulse: 91 (11/11/20 0600)  Resp: 18 (11/11/20 0557)  BP: 119/73 (11/11/20 0557)  SpO2: 100 % (11/11/20 0557) Vital Signs (24h Range):  Temp:  [97.7 °F (36.5 °C)-99.4 °F (37.4 °C)] 98.5 °F (36.9 °C)  Pulse:  [85-96] 91  Resp:  [16-18] 18  SpO2:  [100 %] 100 %  BP: ()/(54-93) 119/73     Weight: 74.2 kg (163 lb 9.3 oz)  Body mass index is 28.08 kg/m².      Intake/Output Summary (Last 24 hours) at 11/11/2020 0619  Last data filed at 11/10/2020 2300  Gross per 24 hour   Intake 755 ml   Output 800 ml   Net -45 ml         Significant Labs:  Recent Lab Results       11/11/20  0558   11/11/20  0227   11/11/20  0103   11/11/20  0009   11/10/20  2300        Albumin               Alkaline Phosphatase               ALT               Anion Gap               AST               Baso #               Basophil %               BILIRUBIN TOTAL               BUN               Calcium               Chloride               CO2               Creatinine               Differential Method               eGFR if                eGFR if non                Eos #               Eosinophil %               Glucose               Gran # (ANC)               Gran %               Hematocrit               Hemoglobin               Immature Grans (Abs)               Immature Granulocytes               Lymph #               Lymph %               MCH               MCHC               MCV               Mono #               Mono %               MPV               nRBC               Platelets               POCT Glucose 143 160 217 243 229     Potassium               PROTEIN TOTAL               RBC                MARIBEL   SELF           Confirmed By:Dawson Packer RDW               Sodium               WBC                                11/10/20  1955   11/10/20  1939   11/10/20  1815   11/10/20  1750   11/10/20  1558        Albumin   1.5           Alkaline Phosphatase   65           ALT   17           Anion Gap   10           AST   21           Baso #       0.03       Basophil %       0.3       BILIRUBIN TOTAL   0.1  Comment:  For infants and newborns, interpretation of results should be based  on gestational age, weight and in agreement with clinical  observations.  Premature Infant recommended reference ranges:  Up to 24 hours.............<8.0 mg/dL  Up to 48 hours............<12.0 mg/dL  3-5 days..................<15.0 mg/dL  6-29 days.................<15.0 mg/dL             BUN   46           Calcium   7.6           Chloride   108           CO2   16           Creatinine   3.7           Differential Method       Automated       eGFR if    19           eGFR if non    16  Comment:  Calculation used to obtain the estimated glomerular filtration  rate (eGFR) is the CKD-EPI equation.              Eos #       0.1       Eosinophil %       1.1       Glucose   133           Gran # (ANC)       7.7       Gran %       68.4       Hematocrit       22.2       Hemoglobin       7.0       Immature Grans (Abs)       0.09  Comment:  Mild elevation in immature granulocytes is non specific and   can be seen in a variety of conditions including stress response,   acute inflammation, trauma and pregnancy. Correlation with other   laboratory and clinical findings is essential.         Immature Granulocytes       0.8       Lymph #       2.4       Lymph %       21.4       MCH       29.5       MCHC       31.5       MCV       94  Comment:  Reviewed by Technologist.       Mono #       0.9       Mono %       8.0       MPV       10.4       nRBC       0       Platelets       319       POCT Glucose 131   119   97     Potassium   3.8           PROTEIN TOTAL   5.4                .       RBC       2.37       RDW       13.9       Sodium   134           WBC       11.23                        11/10/20  1323   11/10/20  1155   11/10/20  0923   11/10/20  0757        Albumin       1.5     Alkaline Phosphatase       57     ALT       15     Anion Gap       11     AST       16     Baso #             Basophil %             BILIRUBIN TOTAL       0.2  Comment:  For infants and newborns, interpretation of results should be based  on gestational age, weight and in agreement with clinical  observations.  Premature Infant recommended reference ranges:  Up to 24 hours.............<8.0 mg/dL  Up to 48 hours............<12.0 mg/dL  3-5 days..................<15.0 mg/dL  6-29 days.................<15.0 mg/dL       BUN       47     Calcium       7.6     Chloride       108     CO2       16     Creatinine       3.3     Differential Method             eGFR if        21     eGFR if non        19  Comment:  Calculation used to obtain the estimated glomerular filtration  rate (eGFR) is the CKD-EPI equation.        Eos #             Eosinophil %             Glucose       135     Gran # (ANC)             Gran %             Hematocrit             Hemoglobin             Immature Grans (Abs)             Immature Granulocytes             Lymph #             Lymph %             MCH             MCHC             MCV             Mono #             Mono %             MPV             nRBC             Platelets             POCT Glucose 117 150 164       Potassium       4.3     PROTEIN TOTAL       5.0     RBC             RDW             Sodium       135     WBC                   Physical Exam:   Constitutional: She is oriented to person, place, and time. She appears well-developed and well-nourished.    HENT:   Head: Normocephalic and atraumatic.    Eyes: Pupils are equal, round, and reactive to light. EOM are normal.    Neck: Normal range of motion. Neck supple.    Cardiovascular: Normal rate.      Pulmonary/Chest: Effort normal.        Abdominal: Soft.             Musculoskeletal: Normal range of motion and moves all extremeties.       Neurological: She is alert and oriented to person, place, and time.    Skin: Skin is warm and dry.

## 2022-04-18 ENCOUNTER — OFFICE VISIT (OUTPATIENT)
Dept: OPHTHALMOLOGY | Facility: CLINIC | Age: 27
End: 2022-04-18
Payer: MEDICARE

## 2022-04-18 DIAGNOSIS — E10.3592 TYPE 1 DIABETES MELLITUS WITH PROLIFERATIVE RETINOPATHY OF LEFT EYE WITHOUT MACULAR EDEMA: Primary | ICD-10-CM

## 2022-04-18 PROCEDURE — 99499 NO LOS: ICD-10-PCS | Mod: S$GLB,,, | Performed by: OPHTHALMOLOGY

## 2022-04-18 PROCEDURE — 1159F PR MEDICATION LIST DOCUMENTED IN MEDICAL RECORD: ICD-10-PCS | Mod: CPTII,S$GLB,, | Performed by: OPHTHALMOLOGY

## 2022-04-18 PROCEDURE — 99999 PR PBB SHADOW E&M-EST. PATIENT-LVL III: CPT | Mod: PBBFAC,,, | Performed by: OPHTHALMOLOGY

## 2022-04-18 PROCEDURE — 67228 PAN RETINAL PHOTOCOAGULATION - OS - LEFT EYE: ICD-10-PCS | Mod: 79,LT,S$GLB, | Performed by: OPHTHALMOLOGY

## 2022-04-18 PROCEDURE — 67228 TREATMENT X10SV RETINOPATHY: CPT | Mod: 79,LT,S$GLB, | Performed by: OPHTHALMOLOGY

## 2022-04-18 PROCEDURE — 3066F PR DOCUMENTATION OF TREATMENT FOR NEPHROPATHY: ICD-10-PCS | Mod: CPTII,S$GLB,, | Performed by: OPHTHALMOLOGY

## 2022-04-18 PROCEDURE — 1159F MED LIST DOCD IN RCRD: CPT | Mod: CPTII,S$GLB,, | Performed by: OPHTHALMOLOGY

## 2022-04-18 PROCEDURE — 1160F RVW MEDS BY RX/DR IN RCRD: CPT | Mod: CPTII,S$GLB,, | Performed by: OPHTHALMOLOGY

## 2022-04-18 PROCEDURE — 3066F NEPHROPATHY DOC TX: CPT | Mod: CPTII,S$GLB,, | Performed by: OPHTHALMOLOGY

## 2022-04-18 PROCEDURE — 99213 OFFICE O/P EST LOW 20 MIN: CPT | Mod: PBBFAC | Performed by: OPHTHALMOLOGY

## 2022-04-18 PROCEDURE — 99999 PR PBB SHADOW E&M-EST. PATIENT-LVL III: ICD-10-PCS | Mod: PBBFAC,,, | Performed by: OPHTHALMOLOGY

## 2022-04-18 PROCEDURE — 1160F PR REVIEW ALL MEDS BY PRESCRIBER/CLIN PHARMACIST DOCUMENTED: ICD-10-PCS | Mod: CPTII,S$GLB,, | Performed by: OPHTHALMOLOGY

## 2022-04-18 PROCEDURE — 99499 UNLISTED E&M SERVICE: CPT | Mod: S$GLB,,, | Performed by: OPHTHALMOLOGY

## 2022-04-18 NOTE — PROGRESS NOTES
Subjective:       Patient ID: Isabela Read is a 27 y.o. female      No chief complaint on file.    History of Present Illness  HPI     DLS  03/03/2022 by Dr. ARMIN Montaño MD      Pt here for PRP OS    Va seems about the same OU.  No pain      Pt feels that vision is improving OD.  Can see images, fingers etc.  No   pain.  All normal with vision OS    Hemoglobin A1C       Date                     Value               Ref Range             Status                10/23/2021               10.9 (H)            4.0 - 5.6 %           Final                       09/17/2021               10.2 (H)            4.0 - 5.6 %           Final                      04/06/2021               10.9 (H)            4.0 - 5.6 %           Final                        -eye pain  -headaches  -blurred Va  -diplopia  -flashes/floaters    Gtts: None                 POHx:   1. 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 (History)          Assessment/Plan:     1. 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  The patient voiced understanding and wished to proceed with the procedure.  See separate consent form.    2. 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  Has incompetent lens/iris diaphragm and prev SO migrated into AC causing extreme high IOP  Recommend PPL and aphakia with SO.  Eye model used last visit  Discussed will need multiple surgeries even in best scenario since will need  SOR and poss secondary IOL vs CL  Pt understands and agrees.  Pt wishes to wait until after April 11 because of social engagement.  Needs to be scheduled now next avialable    RBA discussed in detail, inc surgical failure, need for more surgery, loss of vision/eye, no recovery of vision, surgical failure, bleeding, infection, high eye pressure (glaucoma), etc.  Pt wishes to proceed.    Follow up in about 2 weeks (around 5/2/2022), or if symptoms worsen or fail to improve, for Surgery.       25 ga PPV, PPL, SO  GA

## 2022-04-20 ENCOUNTER — PES CALL (OUTPATIENT)
Dept: ADMINISTRATIVE | Facility: CLINIC | Age: 27
End: 2022-04-20
Payer: MEDICARE

## 2022-04-22 ENCOUNTER — TELEPHONE (OUTPATIENT)
Dept: OPHTHALMOLOGY | Facility: CLINIC | Age: 27
End: 2022-04-22
Payer: MEDICARE

## 2022-04-22 DIAGNOSIS — E10.3521: Primary | ICD-10-CM

## 2022-04-25 ENCOUNTER — TELEPHONE (OUTPATIENT)
Dept: ADMINISTRATIVE | Facility: CLINIC | Age: 27
End: 2022-04-25
Payer: MEDICARE

## 2022-04-26 ENCOUNTER — TELEPHONE (OUTPATIENT)
Dept: ADMINISTRATIVE | Facility: CLINIC | Age: 27
End: 2022-04-26
Payer: MEDICARE

## 2022-04-27 ENCOUNTER — TELEPHONE (OUTPATIENT)
Dept: ADMINISTRATIVE | Facility: CLINIC | Age: 27
End: 2022-04-27
Payer: MEDICARE

## 2022-05-02 ENCOUNTER — PATIENT MESSAGE (OUTPATIENT)
Dept: ENDOCRINOLOGY | Facility: CLINIC | Age: 27
End: 2022-05-02
Payer: MEDICARE

## 2022-05-08 ENCOUNTER — HOSPITAL ENCOUNTER (INPATIENT)
Facility: HOSPITAL | Age: 27
LOS: 2 days | Discharge: HOME | DRG: 189 | End: 2022-05-10
Attending: EMERGENCY MEDICINE | Admitting: INTERNAL MEDICINE
Payer: MEDICARE

## 2022-05-08 DIAGNOSIS — R07.9 CHEST PAIN: ICD-10-CM

## 2022-05-08 DIAGNOSIS — I16.1 HYPERTENSIVE EMERGENCY: ICD-10-CM

## 2022-05-08 DIAGNOSIS — R06.02 SHORTNESS OF BREATH: ICD-10-CM

## 2022-05-08 DIAGNOSIS — J81.0 ACUTE PULMONARY EDEMA: Primary | ICD-10-CM

## 2022-05-08 PROBLEM — G47.00 INSOMNIA: Status: ACTIVE | Noted: 2022-05-08

## 2022-05-08 PROBLEM — R04.2 HEMOPTYSIS: Status: ACTIVE | Noted: 2022-05-08

## 2022-05-08 LAB
ALBUMIN SERPL BCP-MCNC: 3.8 G/DL (ref 3.5–5.2)
ALLENS TEST: ABNORMAL
ALP SERPL-CCNC: 106 U/L (ref 55–135)
ALT SERPL W/O P-5'-P-CCNC: 20 U/L (ref 10–44)
ANION GAP SERPL CALC-SCNC: 17 MMOL/L (ref 8–16)
AST SERPL-CCNC: 24 U/L (ref 10–40)
BASOPHILS # BLD AUTO: 0.03 K/UL (ref 0–0.2)
BASOPHILS NFR BLD: 0.2 % (ref 0–1.9)
BILIRUB SERPL-MCNC: 1.1 MG/DL (ref 0.1–1)
BNP SERPL-MCNC: 2095 PG/ML (ref 0–99)
BUN SERPL-MCNC: 29 MG/DL (ref 6–20)
BUN SERPL-MCNC: 34 MG/DL (ref 6–30)
CALCIUM SERPL-MCNC: 9.1 MG/DL (ref 8.7–10.5)
CHLORIDE SERPL-SCNC: 95 MMOL/L (ref 95–110)
CHLORIDE SERPL-SCNC: 97 MMOL/L (ref 95–110)
CO2 SERPL-SCNC: 25 MMOL/L (ref 23–29)
CREAT SERPL-MCNC: 5.1 MG/DL (ref 0.5–1.4)
CREAT SERPL-MCNC: 5.2 MG/DL (ref 0.5–1.4)
CTP QC/QA: YES
DIFFERENTIAL METHOD: ABNORMAL
EOSINOPHIL # BLD AUTO: 0.1 K/UL (ref 0–0.5)
EOSINOPHIL NFR BLD: 0.7 % (ref 0–8)
ERYTHROCYTE [DISTWIDTH] IN BLOOD BY AUTOMATED COUNT: 17.5 % (ref 11.5–14.5)
EST. GFR  (AFRICAN AMERICAN): 12.4 ML/MIN/1.73 M^2
EST. GFR  (NON AFRICAN AMERICAN): 10.8 ML/MIN/1.73 M^2
ESTIMATED AVG GLUCOSE: 192 MG/DL (ref 68–131)
GLUCOSE SERPL-MCNC: 255 MG/DL (ref 70–110)
GLUCOSE SERPL-MCNC: 257 MG/DL (ref 70–110)
HBA1C MFR BLD: 8.3 % (ref 4–5.6)
HCO3 UR-SCNC: 23.3 MMOL/L (ref 24–28)
HCO3 UR-SCNC: 32.1 MMOL/L (ref 24–28)
HCT VFR BLD AUTO: 39.3 % (ref 37–48.5)
HCT VFR BLD CALC: 44 %PCV (ref 36–54)
HGB BLD-MCNC: 12.3 G/DL (ref 12–16)
IMM GRANULOCYTES # BLD AUTO: 0.04 K/UL (ref 0–0.04)
IMM GRANULOCYTES NFR BLD AUTO: 0.3 % (ref 0–0.5)
INR PPP: 1.2 (ref 0.8–1.2)
LACTATE SERPL-SCNC: 1.5 MMOL/L (ref 0.5–2.2)
LDH SERPL L TO P-CCNC: 2.69 MMOL/L (ref 0.5–2.2)
LYMPHOCYTES # BLD AUTO: 0.6 K/UL (ref 1–4.8)
LYMPHOCYTES NFR BLD: 4.8 % (ref 18–48)
MCH RBC QN AUTO: 30.2 PG (ref 27–31)
MCHC RBC AUTO-ENTMCNC: 31.3 G/DL (ref 32–36)
MCV RBC AUTO: 97 FL (ref 82–98)
MONOCYTES # BLD AUTO: 0.3 K/UL (ref 0.3–1)
MONOCYTES NFR BLD: 2.1 % (ref 4–15)
NEUTROPHILS # BLD AUTO: 11.2 K/UL (ref 1.8–7.7)
NEUTROPHILS NFR BLD: 91.9 % (ref 38–73)
NRBC BLD-RTO: 0 /100 WBC
PCO2 BLDA: 39.5 MMHG (ref 35–45)
PCO2 BLDA: 59.5 MMHG (ref 35–45)
PH SMN: 7.34 [PH] (ref 7.35–7.45)
PH SMN: 7.38 [PH] (ref 7.35–7.45)
PLATELET # BLD AUTO: 187 K/UL (ref 150–450)
PMV BLD AUTO: 11.3 FL (ref 9.2–12.9)
PO2 BLDA: 31 MMHG (ref 40–60)
PO2 BLDA: 84 MMHG (ref 80–100)
POC BE: -2 MMOL/L
POC BE: 6 MMOL/L
POC IONIZED CALCIUM: 1.04 MMOL/L (ref 1.06–1.42)
POC SATURATED O2: 54 % (ref 95–100)
POC SATURATED O2: 96 % (ref 95–100)
POC TCO2 (MEASURED): 29 MMOL/L (ref 23–29)
POC TCO2: 25 MMOL/L (ref 23–27)
POC TCO2: 34 MMOL/L (ref 24–29)
POCT GLUCOSE: 159 MG/DL (ref 70–110)
POCT GLUCOSE: 204 MG/DL (ref 70–110)
POCT GLUCOSE: >500 MG/DL (ref 70–110)
POCT GLUCOSE: >500 MG/DL (ref 70–110)
POTASSIUM BLD-SCNC: 4.8 MMOL/L (ref 3.5–5.1)
POTASSIUM SERPL-SCNC: 4.8 MMOL/L (ref 3.5–5.1)
PROT SERPL-MCNC: 7.8 G/DL (ref 6–8.4)
PROTHROMBIN TIME: 12 SEC (ref 9–12.5)
RBC # BLD AUTO: 4.07 M/UL (ref 4–5.4)
SAMPLE: ABNORMAL
SARS-COV-2 RDRP RESP QL NAA+PROBE: NEGATIVE
SITE: ABNORMAL
SODIUM BLD-SCNC: 135 MMOL/L (ref 136–145)
SODIUM SERPL-SCNC: 137 MMOL/L (ref 136–145)
TROPONIN I SERPL DL<=0.01 NG/ML-MCNC: 0.01 NG/ML (ref 0–0.03)
WBC # BLD AUTO: 12.19 K/UL (ref 3.9–12.7)

## 2022-05-08 PROCEDURE — 96376 TX/PRO/DX INJ SAME DRUG ADON: CPT | Mod: NTX

## 2022-05-08 PROCEDURE — 99223 PR INITIAL HOSPITAL CARE,LEVL III: ICD-10-PCS | Mod: AI,NTX,, | Performed by: INTERNAL MEDICINE

## 2022-05-08 PROCEDURE — 99292 PR CRITICAL CARE, ADDL 30 MIN: ICD-10-PCS | Mod: NTX,CS,, | Performed by: EMERGENCY MEDICINE

## 2022-05-08 PROCEDURE — 99900035 HC TECH TIME PER 15 MIN (STAT): Mod: NTX

## 2022-05-08 PROCEDURE — 36415 COLL VENOUS BLD VENIPUNCTURE: CPT | Mod: NTX

## 2022-05-08 PROCEDURE — C9399 UNCLASSIFIED DRUGS OR BIOLOG: HCPCS | Mod: NTX

## 2022-05-08 PROCEDURE — 63600175 PHARM REV CODE 636 W HCPCS: Mod: NTX

## 2022-05-08 PROCEDURE — 25500020 PHARM REV CODE 255: Mod: NTX | Performed by: EMERGENCY MEDICINE

## 2022-05-08 PROCEDURE — 99292 CRITICAL CARE ADDL 30 MIN: CPT | Mod: NTX

## 2022-05-08 PROCEDURE — 87389 HIV-1 AG W/HIV-1&-2 AB AG IA: CPT | Mod: NTX | Performed by: EMERGENCY MEDICINE

## 2022-05-08 PROCEDURE — 82803 BLOOD GASES ANY COMBINATION: CPT | Mod: NTX

## 2022-05-08 PROCEDURE — 25000003 PHARM REV CODE 250: Mod: NTX

## 2022-05-08 PROCEDURE — 85610 PROTHROMBIN TIME: CPT | Mod: NTX

## 2022-05-08 PROCEDURE — 99222 1ST HOSP IP/OBS MODERATE 55: CPT | Mod: NTX,,, | Performed by: INTERNAL MEDICINE

## 2022-05-08 PROCEDURE — 80053 COMPREHEN METABOLIC PANEL: CPT | Mod: NTX | Performed by: EMERGENCY MEDICINE

## 2022-05-08 PROCEDURE — 93010 EKG 12-LEAD: ICD-10-PCS | Mod: NTX,,, | Performed by: INTERNAL MEDICINE

## 2022-05-08 PROCEDURE — 84484 ASSAY OF TROPONIN QUANT: CPT | Mod: NTX

## 2022-05-08 PROCEDURE — 80047 BASIC METABLC PNL IONIZED CA: CPT | Mod: NTX

## 2022-05-08 PROCEDURE — 96366 THER/PROPH/DIAG IV INF ADDON: CPT | Mod: NTX

## 2022-05-08 PROCEDURE — 96365 THER/PROPH/DIAG IV INF INIT: CPT | Mod: NTX

## 2022-05-08 PROCEDURE — 93005 ELECTROCARDIOGRAM TRACING: CPT | Mod: NTX

## 2022-05-08 PROCEDURE — 94761 N-INVAS EAR/PLS OXIMETRY MLT: CPT | Mod: NTX

## 2022-05-08 PROCEDURE — 83605 ASSAY OF LACTIC ACID: CPT | Mod: NTX

## 2022-05-08 PROCEDURE — 99291 CRITICAL CARE FIRST HOUR: CPT | Mod: NTX,CS,, | Performed by: EMERGENCY MEDICINE

## 2022-05-08 PROCEDURE — 80053 COMPREHEN METABOLIC PANEL: CPT | Mod: 91,NTX | Performed by: STUDENT IN AN ORGANIZED HEALTH CARE EDUCATION/TRAINING PROGRAM

## 2022-05-08 PROCEDURE — 86803 HEPATITIS C AB TEST: CPT | Mod: NTX | Performed by: EMERGENCY MEDICINE

## 2022-05-08 PROCEDURE — 63600175 PHARM REV CODE 636 W HCPCS: Mod: NTX | Performed by: STUDENT IN AN ORGANIZED HEALTH CARE EDUCATION/TRAINING PROGRAM

## 2022-05-08 PROCEDURE — 83880 ASSAY OF NATRIURETIC PEPTIDE: CPT | Mod: NTX | Performed by: EMERGENCY MEDICINE

## 2022-05-08 PROCEDURE — 36600 WITHDRAWAL OF ARTERIAL BLOOD: CPT | Mod: NTX

## 2022-05-08 PROCEDURE — U0002 COVID-19 LAB TEST NON-CDC: HCPCS | Mod: NTX | Performed by: EMERGENCY MEDICINE

## 2022-05-08 PROCEDURE — 94660 CPAP INITIATION&MGMT: CPT | Mod: NTX

## 2022-05-08 PROCEDURE — 99291 PR CRITICAL CARE, E/M 30-74 MINUTES: ICD-10-PCS | Mod: NTX,CS,, | Performed by: EMERGENCY MEDICINE

## 2022-05-08 PROCEDURE — 96375 TX/PRO/DX INJ NEW DRUG ADDON: CPT | Mod: NTX

## 2022-05-08 PROCEDURE — 25000003 PHARM REV CODE 250: Mod: NTX | Performed by: EMERGENCY MEDICINE

## 2022-05-08 PROCEDURE — 99223 1ST HOSP IP/OBS HIGH 75: CPT | Mod: AI,NTX,, | Performed by: INTERNAL MEDICINE

## 2022-05-08 PROCEDURE — 27000221 HC OXYGEN, UP TO 24 HOURS: Mod: NTX

## 2022-05-08 PROCEDURE — 27000190 HC CPAP FULL FACE MASK W/VALVE: Mod: NTX

## 2022-05-08 PROCEDURE — 63600175 PHARM REV CODE 636 W HCPCS: Mod: NTX | Performed by: EMERGENCY MEDICINE

## 2022-05-08 PROCEDURE — 99292 CRITICAL CARE ADDL 30 MIN: CPT | Mod: NTX,CS,, | Performed by: EMERGENCY MEDICINE

## 2022-05-08 PROCEDURE — 85025 COMPLETE CBC W/AUTO DIFF WBC: CPT | Mod: NTX | Performed by: EMERGENCY MEDICINE

## 2022-05-08 PROCEDURE — 93010 ELECTROCARDIOGRAM REPORT: CPT | Mod: NTX,,, | Performed by: INTERNAL MEDICINE

## 2022-05-08 PROCEDURE — 83036 HEMOGLOBIN GLYCOSYLATED A1C: CPT | Mod: NTX | Performed by: EMERGENCY MEDICINE

## 2022-05-08 PROCEDURE — 80100014 HC HEMODIALYSIS 1:1: Mod: NTX

## 2022-05-08 PROCEDURE — 99222 PR INITIAL HOSPITAL CARE,LEVL II: ICD-10-PCS | Mod: NTX,,, | Performed by: INTERNAL MEDICINE

## 2022-05-08 PROCEDURE — 84484 ASSAY OF TROPONIN QUANT: CPT | Mod: NTX | Performed by: EMERGENCY MEDICINE

## 2022-05-08 PROCEDURE — 99291 CRITICAL CARE FIRST HOUR: CPT | Mod: 25,NTX

## 2022-05-08 PROCEDURE — 11000001 HC ACUTE MED/SURG PRIVATE ROOM: Mod: NTX

## 2022-05-08 RX ORDER — NIFEDIPINE 60 MG/1
60 TABLET, EXTENDED RELEASE ORAL DAILY
Status: DISCONTINUED | OUTPATIENT
Start: 2022-05-08 | End: 2022-05-10 | Stop reason: HOSPADM

## 2022-05-08 RX ORDER — VENLAFAXINE HYDROCHLORIDE 37.5 MG/1
37.5 CAPSULE, EXTENDED RELEASE ORAL DAILY
Status: DISCONTINUED | OUTPATIENT
Start: 2022-05-08 | End: 2022-05-10 | Stop reason: HOSPADM

## 2022-05-08 RX ORDER — NITROGLYCERIN 20 MG/100ML
INJECTION INTRAVENOUS
Status: COMPLETED
Start: 2022-05-08 | End: 2022-05-08

## 2022-05-08 RX ORDER — ONDANSETRON 2 MG/ML
4 INJECTION INTRAMUSCULAR; INTRAVENOUS EVERY 8 HOURS PRN
Status: DISCONTINUED | OUTPATIENT
Start: 2022-05-08 | End: 2022-05-10 | Stop reason: HOSPADM

## 2022-05-08 RX ORDER — TALC
6 POWDER (GRAM) TOPICAL NIGHTLY PRN
Status: DISCONTINUED | OUTPATIENT
Start: 2022-05-08 | End: 2022-05-10 | Stop reason: HOSPADM

## 2022-05-08 RX ORDER — NIFEDIPINE 30 MG/1
30 TABLET, EXTENDED RELEASE ORAL
Status: COMPLETED | OUTPATIENT
Start: 2022-05-08 | End: 2022-05-08

## 2022-05-08 RX ORDER — HEPARIN SODIUM 5000 [USP'U]/ML
5000 INJECTION, SOLUTION INTRAVENOUS; SUBCUTANEOUS EVERY 8 HOURS
Status: DISCONTINUED | OUTPATIENT
Start: 2022-05-09 | End: 2022-05-10 | Stop reason: HOSPADM

## 2022-05-08 RX ORDER — TRAZODONE HYDROCHLORIDE 50 MG/1
50 TABLET ORAL NIGHTLY PRN
Status: DISCONTINUED | OUTPATIENT
Start: 2022-05-08 | End: 2022-05-10 | Stop reason: HOSPADM

## 2022-05-08 RX ORDER — IBUPROFEN 200 MG
24 TABLET ORAL
Status: DISCONTINUED | OUTPATIENT
Start: 2022-05-08 | End: 2022-05-10 | Stop reason: HOSPADM

## 2022-05-08 RX ORDER — POLYETHYLENE GLYCOL 3350 17 G/17G
17 POWDER, FOR SOLUTION ORAL 2 TIMES DAILY PRN
Status: DISCONTINUED | OUTPATIENT
Start: 2022-05-08 | End: 2022-05-10 | Stop reason: HOSPADM

## 2022-05-08 RX ORDER — SODIUM CHLORIDE 9 MG/ML
INJECTION, SOLUTION INTRAVENOUS ONCE
Status: DISCONTINUED | OUTPATIENT
Start: 2022-05-08 | End: 2022-05-09

## 2022-05-08 RX ORDER — HYDRALAZINE HYDROCHLORIDE 50 MG/1
50 TABLET, FILM COATED ORAL EVERY 8 HOURS
Status: DISCONTINUED | OUTPATIENT
Start: 2022-05-08 | End: 2022-05-10 | Stop reason: HOSPADM

## 2022-05-08 RX ORDER — CALCITRIOL 0.5 UG/1
0.5 CAPSULE ORAL DAILY
Status: DISCONTINUED | OUTPATIENT
Start: 2022-05-08 | End: 2022-05-10 | Stop reason: HOSPADM

## 2022-05-08 RX ORDER — SEVELAMER CARBONATE 800 MG/1
800 TABLET, FILM COATED ORAL
Status: DISCONTINUED | OUTPATIENT
Start: 2022-05-08 | End: 2022-05-10 | Stop reason: HOSPADM

## 2022-05-08 RX ORDER — HYDRALAZINE HYDROCHLORIDE 25 MG/1
50 TABLET, FILM COATED ORAL
Status: COMPLETED | OUTPATIENT
Start: 2022-05-08 | End: 2022-05-08

## 2022-05-08 RX ORDER — CARVEDILOL 12.5 MG/1
25 TABLET ORAL
Status: COMPLETED | OUTPATIENT
Start: 2022-05-08 | End: 2022-05-08

## 2022-05-08 RX ORDER — IBUPROFEN 200 MG
16 TABLET ORAL
Status: DISCONTINUED | OUTPATIENT
Start: 2022-05-08 | End: 2022-05-10 | Stop reason: HOSPADM

## 2022-05-08 RX ORDER — INSULIN ASPART 100 [IU]/ML
3 INJECTION, SOLUTION INTRAVENOUS; SUBCUTANEOUS
Status: DISCONTINUED | OUTPATIENT
Start: 2022-05-08 | End: 2022-05-10 | Stop reason: HOSPADM

## 2022-05-08 RX ORDER — SODIUM CHLORIDE 0.9 % (FLUSH) 0.9 %
10 SYRINGE (ML) INJECTION EVERY 12 HOURS PRN
Status: DISCONTINUED | OUTPATIENT
Start: 2022-05-08 | End: 2022-05-10 | Stop reason: HOSPADM

## 2022-05-08 RX ORDER — GLUCAGON 1 MG
1 KIT INJECTION
Status: DISCONTINUED | OUTPATIENT
Start: 2022-05-08 | End: 2022-05-10 | Stop reason: HOSPADM

## 2022-05-08 RX ORDER — FUROSEMIDE 10 MG/ML
120 INJECTION INTRAMUSCULAR; INTRAVENOUS ONCE
Status: COMPLETED | OUTPATIENT
Start: 2022-05-08 | End: 2022-05-08

## 2022-05-08 RX ORDER — ATORVASTATIN CALCIUM 20 MG/1
80 TABLET, FILM COATED ORAL DAILY
Refills: 3 | Status: DISCONTINUED | OUTPATIENT
Start: 2022-05-09 | End: 2022-05-10 | Stop reason: HOSPADM

## 2022-05-08 RX ORDER — NITROGLYCERIN 20 MG/100ML
5 INJECTION INTRAVENOUS CONTINUOUS
Status: DISCONTINUED | OUTPATIENT
Start: 2022-05-08 | End: 2022-05-08

## 2022-05-08 RX ORDER — HEPARIN SODIUM 1000 [USP'U]/ML
1000 INJECTION, SOLUTION INTRAVENOUS; SUBCUTANEOUS
Status: DISCONTINUED | OUTPATIENT
Start: 2022-05-08 | End: 2022-05-10

## 2022-05-08 RX ORDER — DROPERIDOL 2.5 MG/ML
0.62 INJECTION, SOLUTION INTRAMUSCULAR; INTRAVENOUS
Status: COMPLETED | OUTPATIENT
Start: 2022-05-08 | End: 2022-05-08

## 2022-05-08 RX ORDER — SODIUM CHLORIDE 0.9 % (FLUSH) 0.9 %
10 SYRINGE (ML) INJECTION
Status: DISCONTINUED | OUTPATIENT
Start: 2022-05-08 | End: 2022-05-10 | Stop reason: HOSPADM

## 2022-05-08 RX ORDER — ACETAMINOPHEN 325 MG/1
650 TABLET ORAL EVERY 4 HOURS PRN
Status: DISCONTINUED | OUTPATIENT
Start: 2022-05-08 | End: 2022-05-10 | Stop reason: HOSPADM

## 2022-05-08 RX ORDER — NALOXONE HCL 0.4 MG/ML
0.02 VIAL (ML) INJECTION
Status: DISCONTINUED | OUTPATIENT
Start: 2022-05-08 | End: 2022-05-10 | Stop reason: HOSPADM

## 2022-05-08 RX ORDER — CARVEDILOL 25 MG/1
25 TABLET ORAL 2 TIMES DAILY WITH MEALS
Status: DISCONTINUED | OUTPATIENT
Start: 2022-05-08 | End: 2022-05-10 | Stop reason: HOSPADM

## 2022-05-08 RX ORDER — ASPIRIN 81 MG/1
81 TABLET ORAL DAILY
Status: DISCONTINUED | OUTPATIENT
Start: 2022-05-08 | End: 2022-05-10 | Stop reason: HOSPADM

## 2022-05-08 RX ORDER — AMOXICILLIN 250 MG
1 CAPSULE ORAL DAILY PRN
Status: DISCONTINUED | OUTPATIENT
Start: 2022-05-08 | End: 2022-05-10 | Stop reason: HOSPADM

## 2022-05-08 RX ORDER — INSULIN ASPART 100 [IU]/ML
0-5 INJECTION, SOLUTION INTRAVENOUS; SUBCUTANEOUS
Status: DISCONTINUED | OUTPATIENT
Start: 2022-05-08 | End: 2022-05-10 | Stop reason: HOSPADM

## 2022-05-08 RX ORDER — INSULIN ASPART 100 [IU]/ML
10 INJECTION, SOLUTION INTRAVENOUS; SUBCUTANEOUS ONCE
Status: DISCONTINUED | OUTPATIENT
Start: 2022-05-08 | End: 2022-05-09

## 2022-05-08 RX ADMIN — NIFEDIPINE 30 MG: 30 TABLET, FILM COATED, EXTENDED RELEASE ORAL at 11:05

## 2022-05-08 RX ADMIN — ACETAMINOPHEN 650 MG: 325 TABLET ORAL at 09:05

## 2022-05-08 RX ADMIN — CALCITRIOL 0.5 MCG: 0.5 CAPSULE, LIQUID FILLED ORAL at 02:05

## 2022-05-08 RX ADMIN — NITROGLYCERIN 5 MCG/MIN: 20 INJECTION INTRAVENOUS at 08:05

## 2022-05-08 RX ADMIN — IOHEXOL 75 ML: 350 INJECTION, SOLUTION INTRAVENOUS at 02:05

## 2022-05-08 RX ADMIN — HYDRALAZINE HYDROCHLORIDE 50 MG: 25 TABLET, FILM COATED ORAL at 11:05

## 2022-05-08 RX ADMIN — DROPERIDOL 0.62 MG: 2.5 INJECTION, SOLUTION INTRAMUSCULAR; INTRAVENOUS at 08:05

## 2022-05-08 RX ADMIN — FUROSEMIDE 120 MG: 10 INJECTION, SOLUTION INTRAMUSCULAR; INTRAVENOUS at 02:05

## 2022-05-08 RX ADMIN — INSULIN ASPART 5 UNITS: 100 INJECTION, SOLUTION INTRAVENOUS; SUBCUTANEOUS at 05:05

## 2022-05-08 RX ADMIN — NIFEDIPINE 60 MG: 60 TABLET, FILM COATED, EXTENDED RELEASE ORAL at 02:05

## 2022-05-08 RX ADMIN — HYDRALAZINE HYDROCHLORIDE 50 MG: 50 TABLET ORAL at 09:05

## 2022-05-08 RX ADMIN — CARVEDILOL 25 MG: 25 TABLET, FILM COATED ORAL at 06:05

## 2022-05-08 RX ADMIN — INSULIN DETEMIR 10 UNITS: 100 INJECTION, SOLUTION SUBCUTANEOUS at 10:05

## 2022-05-08 RX ADMIN — ASPIRIN 81 MG: 81 TABLET, COATED ORAL at 02:05

## 2022-05-08 RX ADMIN — INSULIN ASPART 3 UNITS: 100 INJECTION, SOLUTION INTRAVENOUS; SUBCUTANEOUS at 05:05

## 2022-05-08 RX ADMIN — CARVEDILOL 25 MG: 12.5 TABLET, FILM COATED ORAL at 11:05

## 2022-05-08 NOTE — ED PROVIDER NOTES
Source of History   Patient and family    Chief Complaint   Shortness of Breath      History Of Present Illness   Isabela Read is a 27 y.o. female presenting with shortness of breath that awoke her from sleep at around 4:00 a.m. and steadily worsened.  Per family, this has happened before with fluid on her lungs.  She dialyzed yesterday but did not get to her dry weight.  No fever or chills.    Review Of Systems   As per HPI and below:  Unable to obtain:  Acuity.    Review of patient's allergies indicates:  No Known Allergies    Current Facility-Administered Medications on File Prior to Encounter   Medication Dose Route Frequency Provider Last Rate Last Admin    0.9%  NaCl infusion   Intravenous Continuous Tavo Becker MD 25 mL/hr at 12/10/21 1043 New Bag at 12/10/21 1043     Current Outpatient Medications on File Prior to Encounter   Medication Sig Dispense Refill    acetaminophen (TYLENOL) 325 MG tablet Take 1 tablet (325 mg total) by mouth every 6 (six) hours as needed for Pain.  0    aspirin (ECOTRIN) 81 MG EC tablet Take 81 mg by mouth once daily.      blood sugar diagnostic Strp To check BG 4 - 6 times daily, to use with insurance preferred meter 150 each 11    calcitRIOL (ROCALTROL) 0.5 MCG Cap Take 0.5 mcg by mouth once daily.      carvediloL (COREG) 25 MG tablet Take 25 mg by mouth 2 (two) times daily with meals.      ferrous sulfate 325 (65 FE) MG EC tablet Take 1 tablet (325 mg total) by mouth 2 (two) times daily. 180 tablet 2    flash glucose scanning reader (FREESTYLE MARCY 14 DAY READER) Misc 1 each by Misc.(Non-Drug; Combo Route) route once daily. 1 each 0    flash glucose sensor (FREESTYLE MARCY 14 DAY SENSOR) Kit 6 kits by Misc.(Non-Drug; Combo Route) route once daily. 6 kit 3    hydrALAZINE (APRESOLINE) 25 MG tablet Take 2 tablets (50 mg total) by mouth every 8 (eight) hours. 90 tablet 3    insulin detemir U-100 (LEVEMIR FLEXTOUCH) 100 unit/mL (3 mL) SubQ InPn pen 10 units  "subcutaneously in the morning and 12 units sq at night 15 mL 6    insulin lispro (HUMALOG KWIKPEN INSULIN) 100 unit/mL pen Inject 10 units into skin the skin 3 three times daily with meals 15 mL 6    lancets Misc To check BG 4 - 6 times daily, to use with insurance preferred meter 150 each 11    medroxyPROGESTERone (DEPO-PROVERA) 150 mg/mL Syrg Inject 1 mL (150 mg total) into the muscle once. for 1 dose 1 mL 3    NIFEdipine (PROCARDIA-XL) 30 MG (OSM) 24 hr tablet Take 60 mg by mouth once daily.      pen needle, diabetic 32 gauge x 5/32" Ndle For three times daily injection 100 each 11    rosuvastatin (CRESTOR) 20 MG tablet Take 1 tablet (20 mg total) by mouth every evening. 90 tablet 3    sevelamer carbonate (RENVELA) 800 mg Tab TAKE 1 TABLET BY MOUTH THREE TIMES DAILY WITH MEALS 90 tablet 0    traZODone (DESYREL) 50 MG tablet Take 1 tablet (50 mg total) by mouth nightly as needed for Insomnia. 30 tablet 1    venlafaxine (EFFEXOR XR) 37.5 MG 24 hr capsule Take 1 capsule (37.5 mg total) by mouth once daily. 30 capsule 11       Past History   As per HPI and below:  Past Medical History:   Diagnosis Date    Anemia     Chronic hypertension with exacerbation during pregnancy in second trimester 11/6/2020    Current regimen (11/6/20):  - Carvedilol 12.5 mg BID - Nifedipine 30 mg daily Baseline CKD + proteinuria    Chronic kidney disease     Depression     Diabetes mellitus     Diabetic retinopathy     Diarrhea     Encounter for blood transfusion     Gastroparesis     Glaucoma     Hepatomegaly 4/29/2021    Hx of psychiatric care     Hyperlipidemia     Hypertension     Nephrotic syndrome     Nephrotic syndrome 3/4/2021    Palpitations     Poor fetal growth affecting management of mother in second trimester 10/15/2020    Restrictive lung disease     Retinal detachment     Severe pre-eclampsia in second trimester 11/6/2020     Past Surgical History:   Procedure Laterality Date    AV FISTULA " PLACEMENT Left 4/7/2021    Procedure: CREATION, AV FISTULA;  Surgeon: Roberto Ryan MD;  Location: [facility] OR 2ND FLR;  Service: Peripheral Vascular;  Laterality: Left;    COLONOSCOPY N/A 3/16/2022    Procedure: COLONOSCOPY;  Surgeon: Tavo Kwok MD;  Location: [facility] ENDO (4TH FLR);  Service: Endoscopy;  Laterality: N/A;  Questionable history of delayed gastric emptying longstanding diabetes now on eating pre transplant workup for history of nausea vomiting which seems to have improved with dialysis also chronic diarrhea and history of anemia pre transplant workup for kidney transplant. 3    ESOPHAGOGASTRODUODENOSCOPY N/A 3/16/2022    Procedure: EGD (ESOPHAGOGASTRODUODENOSCOPY);  Surgeon: Tavo Kwok MD;  Location: [facility] ENDO (4TH FLR);  Service: Endoscopy;  Laterality: N/A;  Questionable history of delayed gastric emptying longstanding diabetes now on eating pre transplant workup for history of nausea vomiting which seems to have improved with dialysis also chronic diarrhea and history of anemia pre transplant workup for    FISTULOGRAM N/A 8/11/2021    Procedure: Fistulogram;  Surgeon: Roberto Ryan MD;  Location: [facility] CATH LAB;  Service: Cardiology;  Laterality: N/A;    PERCUTANEOUS TRANSLUMINAL ANGIOPLASTY OF ARTERIOVENOUS FISTULA N/A 8/11/2021    Procedure: PTA, AV FISTULA;  Surgeon: Roberto Ryan MD;  Location: [facility] CATH LAB;  Service: Cardiology;  Laterality: N/A;    REMOVAL IMPLANT, POSTERIOR SEGMENT, INTRAOCULAR Right 2/1/2022    Procedure: REMOVAL IMPLANT, POSTERIOR SEGMENT, INTRAOCULAR;  Surgeon: Maximilian Montaño MD;  Location: [facility] OR [address] [place] [address];  Service: Ophthalmology;  Laterality: Right;    REPAIR OF RETINAL DETACHMENT WITH VITRECTOMY Right 1/25/2022    Procedure: REPAIR, RETINAL DETACHMENT, WITH VITRECTOMY, MEMBRANE PEEL, LASER, INJECTION OF GAS VS OIL;  Surgeon: Maximilian Montaño MD;  Location: [facility] OR 1ST [facility];  Service: Ophthalmology;  Laterality: Right;     REVISION OF ARTERIOVENOUS FISTULA Left 12/10/2021    Procedure: REVISION, AV FISTULA with BVT;  Surgeon: Roberto Ryan MD;  Location: [facility] OR 2ND FLR;  Service: Peripheral Vascular;  Laterality: Left;    VITRECTOMY BY PARS PLANA APPROACH Right 2/1/2022    Procedure: VITRECTOMY, PARS PLANA APPROACH;  Surgeon: Maximilian Montaño MD;  Location: [facility] OR 1ST FLR;  Service: Ophthalmology;  Laterality: Right;       Social History     Socioeconomic History    Marital status: Single   Occupational History    Occupation:  at [employer]   Tobacco Use    Smoking status: Never Smoker    Smokeless tobacco: Never Used   Substance and Sexual Activity    Alcohol use: No    Drug use: No    Sexual activity: Not Currently     Partners: Male     Comment: monogamous   Social History Narrative    Caregiver        Single    No kids    Had Miscarriage  At 23 weeks from preeclampsia    Disabled       Family History   Problem Relation Age of Onset    Hypertension Mother     Heart disease Father     Diabetes Brother     Celiac disease Neg Hx     Cirrhosis Neg Hx     Colon cancer Neg Hx     Colon polyps Neg Hx     Crohn's disease Neg Hx     Inflammatory bowel disease Neg Hx     Liver cancer Neg Hx     Liver disease Neg Hx     Rectal cancer Neg Hx     Stomach cancer Neg Hx     Ulcerative colitis Neg Hx     Esophageal cancer Neg Hx     Hemochromatosis Neg Hx     Pancreatic cancer Neg Hx     Kidney cancer Neg Hx     Bladder Cancer Neg Hx     Uterine cancer Neg Hx     Ovarian cancer Neg Hx        Physical Exam     Vitals:    05/08/22 1049 05/08/22 1113 05/08/22 1147 05/08/22 1204   BP: (!) 162/67 (!) 149/64 (!) 173/81 (!) 174/84   Pulse: (!) 120 (!) 119 (!) 120 (!) 117   Resp:       Temp:       TempSrc:       SpO2: 100% 97% 98% 98%   Weight:       Height:         Appearance:  In respiratory distress.  Skin: No rashes seen.  Good turgor.  No abrasions.  No ecchymoses.  Eyes: No conjunctival injection.  ENT:  Oropharynx clear.    Chest:  Rales all the way up bilaterally.  No wheezes.  Cardiovascular:  Tachycardic, regular.  No murmurs. No gallops. No rubs.  Abdomen: Soft.  Not distended.  Nontender.  No guarding.  No rebound.  Musculoskeletal: Good range of motion all joints.  No deformities.  Neck supple.  No meningismus.  Neurologic: Motor intact.  Sensation intact.  Cranial nerves intact.  Mental Status:  Alert and oriented x 3.  Appropriate, conversant.      Initial MDM   Respiratory distress with sats of 73 on arrival.  Lungs sound wet.  Dialysis yesterday.  Chart review shows history of decreased ejection fraction on cardiac MRI.  Will start BiPAP, do shortness of breath workup.  Given BP, suspect SCAPE.  Will start IV NTG.  Will consider dialysis emergently depending on results.    I decided to obtain the patient's medical records.    Medications   nitroGLYCERIN 50 mg in dextrose 5 % 250 mL infusion (TITRATING) (0 mcg/min Intravenous Stopped 5/8/22 1316)   droperidoL injection 0.625 mg (0.625 mg Intravenous Given 5/8/22 0817)   droperidoL injection 0.625 mg (0.625 mg Intravenous Given 5/8/22 0826)   hydrALAZINE tablet 50 mg (50 mg Oral Given 5/8/22 1111)   NIFEdipine 24 hr tablet 30 mg (30 mg Oral Given 5/8/22 1111)   carvediloL tablet 25 mg (25 mg Oral Given 5/8/22 1111)       Results and Course     Labs Reviewed   CBC W/ AUTO DIFFERENTIAL - Abnormal; Notable for the following components:       Result Value    MCHC 31.3 (*)     RDW 17.5 (*)     Gran # (ANC) 11.2 (*)     Lymph # 0.6 (*)     Gran % 91.9 (*)     Lymph % 4.8 (*)     Mono % 2.1 (*)     All other components within normal limits    Narrative:     Release to patient->Immediate   COMPREHENSIVE METABOLIC PANEL - Abnormal; Notable for the following components:    Glucose 255 (*)     BUN 29 (*)     Creatinine 5.1 (*)     Total Bilirubin 1.1 (*)     Anion Gap 17 (*)     eGFR if  12.4 (*)     eGFR if non  10.8 (*)     All other  components within normal limits    Narrative:     Release to patient->Immediate   B-TYPE NATRIURETIC PEPTIDE - Abnormal; Notable for the following components:    BNP 2,095 (*)     All other components within normal limits    Narrative:     Release to patient->Immediate   ISTAT PROCEDURE - Abnormal; Notable for the following components:    POC Glucose 257 (*)     POC BUN 34 (*)     POC Creatinine 5.2 (*)     POC Sodium 135 (*)     POC Ionized Calcium 1.04 (*)     All other components within normal limits   ISTAT LACTATE - Abnormal; Notable for the following components:    POC Lactate 2.69 (*)     All other components within normal limits   ISTAT PROCEDURE - Abnormal; Notable for the following components:    POC PH 7.339 (*)     POC PCO2 59.5 (*)     POC PO2 31 (*)     POC HCO3 32.1 (*)     POC SATURATED O2 54 (*)     POC TCO2 34 (*)     All other components within normal limits   TROPONIN I    Narrative:     Release to patient->Immediate   HIV 1 / 2 ANTIBODY   HEPATITIS C ANTIBODY   SARS-COV-2 RDRP GENE   ISTAT CHEM8       Imaging Results          X-Ray Chest 1 View (Final result)  Result time 05/08/22 09:06:17    Final result by Jim España MD (05/08/22 09:06:17)                 Impression:      Diffuse bilateral alveolar and interstitial opacities could relate to edema, infection, noninfectious inflammation, hemorrhage, or combination of the above.      Electronically signed by: Jim España  Date:    05/08/2022  Time:    09:06             Narrative:    EXAMINATION:  XR CHEST 1 VIEW    CLINICAL HISTORY:  shortness of breath;    TECHNIQUE:  Single frontal view of the chest was performed.    COMPARISON:  Chest radiograph 01/19/2022    FINDINGS:  Monitoring leads overlie the chest.  The cardiomediastinal contour appears grossly unchanged.  Interstitial alveolar opacities are noted throughout both lungs.    No definite pneumothorax or large volume pleural effusion.  No acute findings identified in the  visualized abdomen.  Osseous and soft tissue structures appear without definite acute change.  Postsurgical sequela are suggested in the left upper extremity.                                ED Course as of 05/08/22 1326   Sun May 08, 2022   0817 EKG 12-lead  Sinus tachycardia, no acute ischemia, biatrial enlargement per my independent interpretation.     [DC]   0842 POC Creatinine(!): 5.2 [DC]   0842 POC BUN(!): 34 [DC]   0842 POC Potassium: 4.8 [DC]   0842 WBC: 12.19 [DC]   0842 Hemoglobin: 12.3 [DC]   0842 Platelets: 187 [DC]   0848 POC PH(!): 7.339 [DC]   0849 POC PCO2(!): 59.5 [DC]   0849 POC Lactate(!): 2.69 [DC]   0900 Improving breathing per patient [DC]   0911 BNP(!): 2,095 [DC]   0911 Troponin I: 0.013 [DC]   0911 Potassium: 4.8 [DC]   0913 X-Ray Chest 1 View  Acute pulmonary edema per my independent interpretation.     [DC]   0935 Patient reports significant improvement in breathing and comfort.  Lungs sound less wet. [DC]   1130 Off Bipap now, weaning off NTG [DC]   1315 NTG drip off [DC]      ED Course User Index  [DC] Ron Goodman MD     Critical Care   Date: 05/08/2022  Performed by: Ron Goodman MD   Authorized by: Ron Goodman MD    Total critical care time (exclusive of procedural time) : 90 minutes  Critical care was necessary to treat or prevent imminent or life-threatening deterioration of the following conditions:  Flash pulmonary edema/hypertensive crisis        MDM, Impression and Plan   27 y.o. female with flash pulmonary edema/hypertensive crisis.  After a few hours on IV nitroglycerin and BiPAP, both have been weaned off and the patient is comfortable on nasal cannula.  Home BP meds given; BP is well controlled.  Discussed with hospital medicine for admission.         Final diagnoses:  [R06.02] Shortness of breath  [J81.0] Acute pulmonary edema (Primary)  [I16.1] Hypertensive emergency          ED Disposition Condition    Admit               Ron Goodman MD  05/08/22 4995        Ron Goodman MD  05/08/22 2482

## 2022-05-08 NOTE — SUBJECTIVE & OBJECTIVE
Past Medical History:   Diagnosis Date    Anemia     Chronic hypertension with exacerbation during pregnancy in second trimester 11/6/2020    Current regimen (11/6/20):  - Carvedilol 12.5 mg BID - Nifedipine 30 mg daily Baseline CKD + proteinuria    Chronic kidney disease     Depression     Diabetes mellitus     Diabetic retinopathy     Diarrhea     Encounter for blood transfusion     Gastroparesis     Glaucoma     Hepatomegaly 4/29/2021    Hx of psychiatric care     Hyperlipidemia     Hypertension     Nephrotic syndrome     Nephrotic syndrome 3/4/2021    Palpitations     Poor fetal growth affecting management of mother in second trimester 10/15/2020    Restrictive lung disease     Retinal detachment     Severe pre-eclampsia in second trimester 11/6/2020       Past Surgical History:   Procedure Laterality Date    AV FISTULA PLACEMENT Left 4/7/2021    Procedure: CREATION, AV FISTULA;  Surgeon: Roberto Ryan MD;  Location: [facility] OR 2ND FLR;  Service: Peripheral Vascular;  Laterality: Left;    COLONOSCOPY N/A 3/16/2022    Procedure: COLONOSCOPY;  Surgeon: Tavo Kwok MD;  Location: [facility] (4TH FLR);  Service: Endoscopy;  Laterality: N/A;  Questionable history of delayed gastric emptying longstanding diabetes now on eating pre transplant workup for history of nausea vomiting which seems to have improved with dialysis also chronic diarrhea and history of anemia pre transplant workup for kidney transplant. 3    ESOPHAGOGASTRODUODENOSCOPY N/A 3/16/2022    Procedure: EGD (ESOPHAGOGASTRODUODENOSCOPY);  Surgeon: Tavo Kwok MD;  Location: [facility] (4TH FLR);  Service: Endoscopy;  Laterality: N/A;  Questionable history of delayed gastric emptying longstanding diabetes now on eating pre transplant workup for history of nausea vomiting which seems to have improved with dialysis also chronic diarrhea and history of anemia pre transplant workup for    FISTULOGRAM N/A 8/11/2021    Procedure: Fistulogram;   Surgeon: Roberto Ryan MD;  Location: [facility] CATH LAB;  Service: Cardiology;  Laterality: N/A;    PERCUTANEOUS TRANSLUMINAL ANGIOPLASTY OF ARTERIOVENOUS FISTULA N/A 8/11/2021    Procedure: PTA, AV FISTULA;  Surgeon: Roberto Ryan MD;  Location: [facility] CATH LAB;  Service: Cardiology;  Laterality: N/A;    REMOVAL IMPLANT, POSTERIOR SEGMENT, INTRAOCULAR Right 2/1/2022    Procedure: REMOVAL IMPLANT, POSTERIOR SEGMENT, INTRAOCULAR;  Surgeon: Maximilian Montaño MD;  Location: [facility] OR [address] [place] [address];  Service: Ophthalmology;  Laterality: Right;    REPAIR OF RETINAL DETACHMENT WITH VITRECTOMY Right 1/25/2022    Procedure: REPAIR, RETINAL DETACHMENT, WITH VITRECTOMY, MEMBRANE PEEL, LASER, INJECTION OF GAS VS OIL;  Surgeon: Maximilian Montaño MD;  [facility];  Service: Ophthalmology;  Laterality: Right;    REVISION OF ARTERIOVENOUS FISTULA Left 12/10/2021    Procedure: REVISION, AV FISTULA with BVT;  Surgeon: Roberto Ryan MD;  [facility];  Service: Peripheral Vascular;  Laterality: Left;    VITRECTOMY BY PARS PLANA APPROACH Right 2/1/2022    Procedure: VITRECTOMY, PARS PLANA APPROACH;  Surgeon: Maximilian Montaño MD;  Location: [facility] OR [address] [place] [address];  Service: Ophthalmology;  Laterality: Right;       Review of patient's allergies indicates:  No Known Allergies    Current Facility-Administered Medications on File Prior to Encounter   Medication    0.9%  NaCl infusion     Current Outpatient Medications on File Prior to Encounter   Medication Sig    acetaminophen (TYLENOL) 325 MG tablet Take 1 tablet (325 mg total) by mouth every 6 (six) hours as needed for Pain.    aspirin (ECOTRIN) 81 MG EC tablet Take 81 mg by mouth once daily.    blood sugar diagnostic Strp To check BG 4 - 6 times daily, to use with insurance preferred meter    calcitRIOL (ROCALTROL) 0.5 MCG Cap Take 0.5 mcg by mouth once daily.    carvediloL (COREG) 25 MG tablet Take 25 mg by mouth 2 (two) times daily with meals.     "ferrous sulfate 325 (65 FE) MG EC tablet Take 1 tablet (325 mg total) by mouth 2 (two) times daily.    flash glucose scanning reader (FREESTYLE MARCY 14 DAY READER) Misc 1 each by Misc.(Non-Drug; Combo Route) route once daily.    flash glucose sensor (FREESTYLE MARCY 14 DAY SENSOR) Kit 6 kits by Misc.(Non-Drug; Combo Route) route once daily.    hydrALAZINE (APRESOLINE) 25 MG tablet Take 2 tablets (50 mg total) by mouth every 8 (eight) hours.    insulin detemir U-100 (LEVEMIR FLEXTOUCH) 100 unit/mL (3 mL) SubQ InPn pen 10 units subcutaneously in the morning and 12 units sq at night    insulin lispro (HUMALOG KWIKPEN INSULIN) 100 unit/mL pen Inject 10 units into skin the skin 3 three times daily with meals    lancets Misc To check BG 4 - 6 times daily, to use with insurance preferred meter    medroxyPROGESTERone (DEPO-PROVERA) 150 mg/mL Syrg Inject 1 mL (150 mg total) into the muscle once. for 1 dose    NIFEdipine (PROCARDIA-XL) 30 MG (OSM) 24 hr tablet Take 60 mg by mouth once daily.    pen needle, diabetic 32 gauge x 5/32" Ndle For three times daily injection    rosuvastatin (CRESTOR) 20 MG tablet Take 1 tablet (20 mg total) by mouth every evening.    sevelamer carbonate (RENVELA) 800 mg Tab TAKE 1 TABLET BY MOUTH THREE TIMES DAILY WITH MEALS    traZODone (DESYREL) 50 MG tablet Take 1 tablet (50 mg total) by mouth nightly as needed for Insomnia.    venlafaxine (EFFEXOR XR) 37.5 MG 24 hr capsule Take 1 capsule (37.5 mg total) by mouth once daily.     Family History       Problem Relation (Age of Onset)    Diabetes Brother    Heart disease Father    Hypertension Mother          Tobacco Use    Smoking status: Never Smoker    Smokeless tobacco: Never Used   Substance and Sexual Activity    Alcohol use: No    Drug use: No    Sexual activity: Not Currently     Partners: Male     Comment: monogamous     Review of Systems   Constitutional:  Negative for activity change, chills, diaphoresis, fatigue and fever.   HENT:  " Negative for congestion, rhinorrhea, sore throat and trouble swallowing.    Eyes:  Negative for photophobia, redness and visual disturbance.   Respiratory:  Negative for cough, shortness of breath and wheezing.    Cardiovascular:  Negative for chest pain, palpitations and leg swelling.   Gastrointestinal:  Positive for nausea and vomiting. Negative for abdominal pain, constipation and diarrhea.   Endocrine: Negative for polydipsia, polyphagia and polyuria.   Genitourinary:  Positive for decreased urine volume (baseline, urinates ~1x/day). Negative for difficulty urinating, dysuria and hematuria.   Musculoskeletal:  Negative for back pain and myalgias.   Skin:  Negative for rash and wound.   Neurological:  Negative for dizziness, syncope, speech difficulty, weakness, light-headedness and headaches.   Psychiatric/Behavioral:  Negative for agitation, confusion and hallucinations.    Objective:     Vital Signs (Most Recent):  Temp: 98.5 °F (36.9 °C) (05/08/22 0759)  Pulse: 107 (05/08/22 1449)  Resp: (!) 36 (05/08/22 1449)  BP: (!) 178/88 (05/08/22 1449)  SpO2: 97 % (05/08/22 1449)   Vital Signs (24h Range):  Temp:  [98.5 °F (36.9 °C)] 98.5 °F (36.9 °C)  Pulse:  [106-187] 107  Resp:  [22-44] 36  SpO2:  [73 %-100 %] 97 %  BP: (138-184)/(59-92) 178/88     Weight: 61.6 kg (135 lb 12.9 oz)  Body mass index is 23.31 kg/m².    Physical Exam  Vitals and nursing note reviewed.   Constitutional:       General: She is not in acute distress.     Appearance: She is normal weight. She is ill-appearing.      Interventions: Nasal cannula in place.      Comments: Patient lying in stretcher, eyes remained closed throughout most interview however answers all questions appropriately and follows all commands   HENT:      Head: Normocephalic and atraumatic.      Comments: Alopecia present (chronic)     Right Ear: External ear normal.      Left Ear: External ear normal.      Nose: Nose normal.      Mouth/Throat:      Mouth: Mucous membranes  are moist.      Pharynx: Oropharynx is clear. No oropharyngeal exudate.   Eyes:      General: No visual field deficit.     Extraocular Movements: Extraocular movements intact.      Conjunctiva/sclera: Conjunctivae normal.      Pupils: Pupils are equal, round, and reactive to light.      Comments: Horizontal nystagmus elicited on EOM testing   Neck:      Comments: JVD present  Cardiovascular:      Rate and Rhythm: Normal rate and regular rhythm.      Pulses: Normal pulses.      Heart sounds: Murmur heard.   Pulmonary:      Effort: Pulmonary effort is normal. No respiratory distress.      Breath sounds: Rales present. No wheezing or rhonchi.      Comments: Crackles auscultated throughout all lung fields  Abdominal:      General: There is no distension.      Palpations: Abdomen is soft.      Tenderness: There is no abdominal tenderness. There is no guarding.   Musculoskeletal:         General: Normal range of motion.      Cervical back: Normal range of motion.      Right lower leg: No edema.      Left lower leg: No edema.   Skin:     General: Skin is warm.   Neurological:      Mental Status: She is oriented to person, place, and time. She is lethargic.      Sensory: No sensory deficit.      Motor: No weakness.      Comments: Somnolent but awakes to name and answers questions appropriately  Oriented x4  CN II-XII grossly intact  Horizontal nystagmus present  Equal strength in bilateral upper and lower extremities   Psychiatric:         Behavior: Behavior is cooperative.          Significant Labs: All pertinent labs within the past 24 hours have been reviewed.  ABGs:   Recent Labs   Lab 05/08/22  0834 05/08/22  1436   PH 7.339* 7.379   PCO2 59.5* 39.5   HCO3 32.1* 23.3*   POCSATURATED 54* 96   BE 6 -2   PO2 31* 84     CBC:   Recent Labs   Lab 05/08/22  0815 05/08/22  0817   WBC 12.19  --    HGB 12.3  --    HCT 39.3 44     --      CMP:   Recent Labs   Lab 05/08/22  0815      K 4.8   CL 95   CO2 25   *    BUN 29*   CREATININE 5.1*   CALCIUM 9.1   PROT 7.8   ALBUMIN 3.8   BILITOT 1.1*   ALKPHOS 106   AST 24   ALT 20   ANIONGAP 17*   EGFRNONAA 10.8*       Significant Imaging: I have reviewed all pertinent imaging results/findings within the past 24 hours.  X-Ray Chest 1 View   Final Result      Diffuse bilateral alveolar and interstitial opacities could relate to edema, infection, noninfectious inflammation, hemorrhage, or combination of the above.         Electronically signed by: Jim España   Date:    05/08/2022   Time:    09:06      CTA Chest Non Coronary    (Results Pending)

## 2022-05-08 NOTE — PROGRESS NOTES
Pt's  Bg > 500 , pt received 8 unit of insulin as ordered . And IM 4  notified and he said he will put some more orders . Will continue to monitor .

## 2022-05-08 NOTE — ASSESSMENT & PLAN NOTE
28 yo female with history of uncontrolled T1DM c/b ESRD on HD and retinopathy, hypertension who presents who presents with hypertensive emergency. Troponin negative, BNP 2095. CXR revealed bilateral alveolar pulmonary edema. Presented tachycardic () and hypertensive (/92) requiring nitroglycerin gtt, weaned off after improvement in BP. Triage SpO2 in 70s, patient placed on BiPAP for a brief period and weaned to nasal cannula. Saturating 100% on 2 L NC with good wave form upon evaluation. Somnolent but mentation appropriate. Neuro exam reveals horizontal nystagmus otherwise unremarkable. Most recent HD yesterday (5/7) with ~3 L removed. Suspicion for flash pulmonary edema.    CTA obtained to evaluated low suspicion PE: no evident pulmonary embolism or right heart strain; diffuse GGO bilaterally with interlobular septal thickening (pulmonary edema vs hemorrhage vs infectious); findings suggestive of high-grade narrowing vs near complete obstruction of SVC proximal to SVC/left brachiocephalic vein junction; nonspecific mediastinal and hilar LAD    Nephrology consulted for urgent HD  CTA as above  Considering pulmonary consult: flash pulmonary edema vs DAH?  Resume home BP medications: Coreg 25 mg BID, hydralazine 25 mg 50 mg q8h, nifedipine 60 mg daily  Lasix  mg  Strict I/O's

## 2022-05-08 NOTE — ASSESSMENT & PLAN NOTE
Home regimen: insulin detemir 10 u am & 12 u qhs + insulin lispro 10 u TIDWM  Last A1c 10.9% (10/23/21)    Detemir 10 u BID  Aspart 3 u TIDWM  LDSSI  Will adjust insulin dosing as appropriate  POCT glucose checks ac/hs  Diabetic/renal diet  Repeat A1c

## 2022-05-08 NOTE — ASSESSMENT & PLAN NOTE
Access: AVF LUE, good thrill  HD TThSat  Last HD day prior to presentation (5/7/22), ~3L removed    Nephrology consulted for urgent HD and inpatient HD needs  Continue calcitriol and sevelamer

## 2022-05-08 NOTE — ASSESSMENT & PLAN NOTE
"CTA chest w/ contrast neg for PE, however it showed:   "Diffuse ground-glass opacities throughout both lungs with interlobular septal thickening which nonspecific may reflect pulmonary edema, hemorrhage, or infectious process with considerations including fungal and viral organisms"    -Plan per primary team     "

## 2022-05-08 NOTE — H&P
[facility] Medicine  History & Physical    Patient Name: Isabela Read  MRN: [id number]  Patient Class: IP- Inpatient  Admission Date: 5/8/2022  Attending Physician: Chantal Ferrara MD   Primary Care Provider: Tavo Bhatia MD         Patient information was obtained from patient, past medical records and ER records.     Subjective:     Principal Problem:Hypertensive emergency    Chief Complaint:   Chief Complaint   Patient presents with    Shortness of Breath        HPI: Ms. Read is a 26 yo female with history of uncontrolled T1DM c/b ESRD on HD and retinopathy, hypertension who presents with sudden onset hemoptysis. Patient states she was sleeping this morning and was suddenly awakened by a cough with bright red blood. The volume of blood was not remarkable, but there was more than just streaks present. She also endorses nausea, vomiting, dizziness and generalized weakness. She continued to feel poorly and have these bloody coughing episodes, so her parents brought her to the ED. She describes a similar episode last January (2022) for which she states resolved with HD. Denies shortness of breath, chest pain, palpitations, abdominal pain, urinary or bowel complaints, headaches, extremity weakness, fever/chills.     Patient's receives HD TThSat. Her last session was the day prior to ED presentation (5/7/22), and states ~3 L fluid removed. Patient is ambulatory at baseline and independent in ADLs. She lives with her parents. Denies tobacco, alcohol, and illicit substance use.    ED course: Patient presented afebrile, tachycardic (), and in hypertensive urgency (/92). Triage SpO2 73%, initially placed on BiPAP initially with improved respiratory status, weaned to nasal cannula. Administered home BP meds and placed on nitroglycerin gtt with BP improvement to SBP 120s, in the 160s upon evaluation. Weaned off gtt. ECG revealed sinus tachycardia. Labs reveal no leukocytosis,  HGB 12.1 (patient does have BL HGB ~8.0). Electrolytes WNL. Glucose 255. sCr 5.1 with ESRD. Troponin negative, BNP elevated at 2095. Lactate elevated at 2.69. CXR revealed bilateral pulmonary edema.     Patient to be admitted to hospital medicine for further evaluation and work-up.       Past Medical History:   Diagnosis Date    Anemia     Chronic hypertension with exacerbation during pregnancy in second trimester 11/6/2020    Current regimen (11/6/20):  - Carvedilol 12.5 mg BID - Nifedipine 30 mg daily Baseline CKD + proteinuria    Chronic kidney disease     Depression     Diabetes mellitus     Diabetic retinopathy     Diarrhea     Encounter for blood transfusion     Gastroparesis     Glaucoma     Hepatomegaly 4/29/2021    Hx of psychiatric care     Hyperlipidemia     Hypertension     Nephrotic syndrome     Nephrotic syndrome 3/4/2021    Palpitations     Poor fetal growth affecting management of mother in second trimester 10/15/2020    Restrictive lung disease     Retinal detachment     Severe pre-eclampsia in second trimester 11/6/2020       Past Surgical History:   Procedure Laterality Date    AV FISTULA PLACEMENT Left 4/7/2021    Procedure: CREATION, AV FISTULA;  Surgeon: Roberto Ryan MD;  Location: [facility] OR 2ND FLR;  Service: Peripheral Vascular;  Laterality: Left;    COLONOSCOPY N/A 3/16/2022    Procedure: COLONOSCOPY;  Surgeon: Tavo Kwok MD;  Location: [facility] (4TH FLR);  Service: Endoscopy;  Laterality: N/A;  Questionable history of delayed gastric emptying longstanding diabetes now on eating pre transplant workup for history of nausea vomiting which seems to have improved with dialysis also chronic diarrhea and history of anemia pre transplant workup for kidney transplant. 3    ESOPHAGOGASTRODUODENOSCOPY N/A 3/16/2022    Procedure: EGD (ESOPHAGOGASTRODUODENOSCOPY);  Surgeon: Tavo Kwok MD;  Location: [facility] ENDO (4TH FLR);  Service: Endoscopy;  Laterality:  N/A;  Questionable history of delayed gastric emptying longstanding diabetes now on eating pre transplant workup for history of nausea vomiting which seems to have improved with dialysis also chronic diarrhea and history of anemia pre transplant workup for    FISTULOGRAM N/A 8/11/2021    Procedure: Fistulogram;  Surgeon: Roberto Ryan MD;  Location: [facility] CATH LAB;  Service: Cardiology;  Laterality: N/A;    PERCUTANEOUS TRANSLUMINAL ANGIOPLASTY OF ARTERIOVENOUS FISTULA N/A 8/11/2021    Procedure: PTA, AV FISTULA;  Surgeon: Roberto Ryan MD;  Location: [facility] CATH LAB;  Service: Cardiology;  Laterality: N/A;    REMOVAL IMPLANT, POSTERIOR SEGMENT, INTRAOCULAR Right 2/1/2022    Procedure: REMOVAL IMPLANT, POSTERIOR SEGMENT, INTRAOCULAR;  Surgeon: Maximilian Montaño MD;  [facility];  Service: Ophthalmology;  Laterality: Right;    REPAIR OF RETINAL DETACHMENT WITH VITRECTOMY Right 1/25/2022    Procedure: REPAIR, RETINAL DETACHMENT, WITH VITRECTOMY, MEMBRANE PEEL, LASER, INJECTION OF GAS VS OIL;  Surgeon: Maximilian Montaño MD;  Location: [facility] OR 1ST FLR;  Service: Ophthalmology;  Laterality: Right;    REVISION OF ARTERIOVENOUS FISTULA Left 12/10/2021    Procedure: REVISION, AV FISTULA with BVT;  Surgeon: Roberto Ryan MD;  Location: [facility] OR 2ND FLR;  Service: Peripheral Vascular;  Laterality: Left;    VITRECTOMY BY PARS PLANA APPROACH Right 2/1/2022    Procedure: VITRECTOMY, PARS PLANA APPROACH;  Surgeon: Maximilian Montaño MD;  Location: [facility] OR 1ST FLR;  Service: Ophthalmology;  Laterality: Right;       Review of patient's allergies indicates:  No Known Allergies    Current Facility-Administered Medications on File Prior to Encounter   Medication    0.9%  NaCl infusion     Current Outpatient Medications on File Prior to Encounter   Medication Sig    acetaminophen (TYLENOL) 325 MG tablet Take 1 tablet (325 mg total) by mouth every 6 (six) hours as needed for Pain.     "aspirin (ECOTRIN) 81 MG EC tablet Take 81 mg by mouth once daily.    blood sugar diagnostic Strp To check BG 4 - 6 times daily, to use with insurance preferred meter    calcitRIOL (ROCALTROL) 0.5 MCG Cap Take 0.5 mcg by mouth once daily.    carvediloL (COREG) 25 MG tablet Take 25 mg by mouth 2 (two) times daily with meals.    ferrous sulfate 325 (65 FE) MG EC tablet Take 1 tablet (325 mg total) by mouth 2 (two) times daily.    flash glucose scanning reader (FREESTYLE MARCY 14 DAY READER) Misc 1 each by Misc.(Non-Drug; Combo Route) route once daily.    flash glucose sensor (FREESTYLE MARCY 14 DAY SENSOR) Kit 6 kits by Misc.(Non-Drug; Combo Route) route once daily.    hydrALAZINE (APRESOLINE) 25 MG tablet Take 2 tablets (50 mg total) by mouth every 8 (eight) hours.    insulin detemir U-100 (LEVEMIR FLEXTOUCH) 100 unit/mL (3 mL) SubQ InPn pen 10 units subcutaneously in the morning and 12 units sq at night    insulin lispro (HUMALOG KWIKPEN INSULIN) 100 unit/mL pen Inject 10 units into skin the skin 3 three times daily with meals    lancets Misc To check BG 4 - 6 times daily, to use with insurance preferred meter    medroxyPROGESTERone (DEPO-PROVERA) 150 mg/mL Syrg Inject 1 mL (150 mg total) into the muscle once. for 1 dose    NIFEdipine (PROCARDIA-XL) 30 MG (OSM) 24 hr tablet Take 60 mg by mouth once daily.    pen needle, diabetic 32 gauge x 5/32" Ndle For three times daily injection    rosuvastatin (CRESTOR) 20 MG tablet Take 1 tablet (20 mg total) by mouth every evening.    sevelamer carbonate (RENVELA) 800 mg Tab TAKE 1 TABLET BY MOUTH THREE TIMES DAILY WITH MEALS    traZODone (DESYREL) 50 MG tablet Take 1 tablet (50 mg total) by mouth nightly as needed for Insomnia.    venlafaxine (EFFEXOR XR) 37.5 MG 24 hr capsule Take 1 capsule (37.5 mg total) by mouth once daily.     Family History       Problem Relation (Age of Onset)    Diabetes Brother    Heart disease Father    Hypertension Mother      "     Tobacco Use    Smoking status: Never Smoker    Smokeless tobacco: Never Used   Substance and Sexual Activity    Alcohol use: No    Drug use: No    Sexual activity: Not Currently     Partners: Male     Comment: monogamous     Review of Systems   Constitutional:  Negative for activity change, chills, diaphoresis, fatigue and fever.   HENT:  Negative for congestion, rhinorrhea, sore throat and trouble swallowing.    Eyes:  Negative for photophobia, redness and visual disturbance.   Respiratory:  Negative for cough, shortness of breath and wheezing.    Cardiovascular:  Negative for chest pain, palpitations and leg swelling.   Gastrointestinal:  Positive for nausea and vomiting. Negative for abdominal pain, constipation and diarrhea.   Endocrine: Negative for polydipsia, polyphagia and polyuria.   Genitourinary:  Positive for decreased urine volume (baseline, urinates ~1x/day). Negative for difficulty urinating, dysuria and hematuria.   Musculoskeletal:  Negative for back pain and myalgias.   Skin:  Negative for rash and wound.   Neurological:  Negative for dizziness, syncope, speech difficulty, weakness, light-headedness and headaches.   Psychiatric/Behavioral:  Negative for agitation, confusion and hallucinations.    Objective:     Vital Signs (Most Recent):  Temp: 98.5 °F (36.9 °C) (05/08/22 0759)  Pulse: 107 (05/08/22 1449)  Resp: (!) 36 (05/08/22 1449)  BP: (!) 178/88 (05/08/22 1449)  SpO2: 97 % (05/08/22 1449)   Vital Signs (24h Range):  Temp:  [98.5 °F (36.9 °C)] 98.5 °F (36.9 °C)  Pulse:  [106-187] 107  Resp:  [22-44] 36  SpO2:  [73 %-100 %] 97 %  BP: (138-184)/(59-92) 178/88     Weight: 61.6 kg (135 lb 12.9 oz)  Body mass index is 23.31 kg/m².    Physical Exam  Vitals and nursing note reviewed.   Constitutional:       General: She is not in acute distress.     Appearance: She is normal weight. She is ill-appearing.      Interventions: Nasal cannula in place.      Comments: Patient lying in stretcher,  eyes remained closed throughout most interview however answers all questions appropriately and follows all commands   HENT:      Head: Normocephalic and atraumatic.      Comments: Alopecia present (chronic)     Right Ear: External ear normal.      Left Ear: External ear normal.      Nose: Nose normal.      Mouth/Throat:      Mouth: Mucous membranes are moist.      Pharynx: Oropharynx is clear. No oropharyngeal exudate.   Eyes:      General: No visual field deficit.     Extraocular Movements: Extraocular movements intact.      Conjunctiva/sclera: Conjunctivae normal.      Pupils: Pupils are equal, round, and reactive to light.      Comments: Horizontal nystagmus elicited on EOM testing   Neck:      Comments: JVD present  Cardiovascular:      Rate and Rhythm: Normal rate and regular rhythm.      Pulses: Normal pulses.      Heart sounds: Murmur heard.   Pulmonary:      Effort: Pulmonary effort is normal. No respiratory distress.      Breath sounds: Rales present. No wheezing or rhonchi.      Comments: Crackles auscultated throughout all lung fields  Abdominal:      General: There is no distension.      Palpations: Abdomen is soft.      Tenderness: There is no abdominal tenderness. There is no guarding.   Musculoskeletal:         General: Normal range of motion.      Cervical back: Normal range of motion.      Right lower leg: No edema.      Left lower leg: No edema.   Skin:     General: Skin is warm.   Neurological:      Mental Status: She is oriented to person, place, and time. She is lethargic.      Sensory: No sensory deficit.      Motor: No weakness.      Comments: Somnolent but awakes to name and answers questions appropriately  Oriented x4  CN II-XII grossly intact  Horizontal nystagmus present  Equal strength in bilateral upper and lower extremities   Psychiatric:         Behavior: Behavior is cooperative.          Significant Labs: All pertinent labs within the past 24 hours have been reviewed.  ABGs:   Recent  Labs   Lab 05/08/22  0834 05/08/22  1436   PH 7.339* 7.379   PCO2 59.5* 39.5   HCO3 32.1* 23.3*   POCSATURATED 54* 96   BE 6 -2   PO2 31* 84     CBC:   Recent Labs   Lab 05/08/22  0815 05/08/22  0817   WBC 12.19  --    HGB 12.3  --    HCT 39.3 44     --      CMP:   Recent Labs   Lab 05/08/22  0815      K 4.8   CL 95   CO2 25   *   BUN 29*   CREATININE 5.1*   CALCIUM 9.1   PROT 7.8   ALBUMIN 3.8   BILITOT 1.1*   ALKPHOS 106   AST 24   ALT 20   ANIONGAP 17*   EGFRNONAA 10.8*       Significant Imaging: I have reviewed all pertinent imaging results/findings within the past 24 hours.  X-Ray Chest 1 View   Final Result      Diffuse bilateral alveolar and interstitial opacities could relate to edema, infection, noninfectious inflammation, hemorrhage, or combination of the above.         Electronically signed by: Jim España   Date:    05/08/2022   Time:    09:06      CTA Chest Non Coronary    (Results Pending)         Assessment/Plan:     * Hypertensive emergency  26 yo female with history of uncontrolled T1DM c/b ESRD on HD and retinopathy, hypertension who presents who presents with hypertensive emergency. Troponin negative, BNP 2095. CXR revealed bilateral alveolar pulmonary edema. Presented tachycardic () and hypertensive (/92) requiring nitroglycerin gtt, weaned off after improvement in BP. Triage SpO2 in 70s, patient placed on BiPAP for a brief period and weaned to nasal cannula. Saturating 100% on 2 L NC with good wave form upon evaluation. Somnolent but mentation appropriate. Neuro exam reveals horizontal nystagmus otherwise unremarkable. Most recent HD yesterday (5/7) with ~3 L removed. Suspicion for flash pulmonary edema.    CTA obtained to evaluated low suspicion PE: no evident pulmonary embolism or right heart strain; diffuse GGO bilaterally with interlobular septal thickening (pulmonary edema vs hemorrhage vs infectious); findings suggestive of high-grade narrowing vs near  "complete obstruction of SVC proximal to SVC/left brachiocephalic vein junction; nonspecific mediastinal and hilar LAD    Nephrology consulted for urgent HD  CTA as above  Considering pulmonary consult: flash pulmonary edema vs DAH?  Resume home BP medications: Coreg 25 mg BID, hydralazine 25 mg 50 mg q8h, nifedipine 60 mg daily  Lasix  mg  Strict I/O's    Hemoptysis  HGB stable  Hemoptysis a/w flash pulmonary edema    Follow CTA  Repeat lactate  RUQ U/S  PT/INR  Vitamin K level  See "hypertensive emergency" plan    Insomnia  Continue home trazodone PRN      ESRD (end stage renal disease)  Access: AVF LUE, good thrill  HD TThSat  Last HD day prior to presentation (5/7/22), ~3L removed    Nephrology consulted for urgent HD and inpatient HD needs  Continue calcitriol and sevelamer      Acute hypoxemic respiratory failure  Patient with Hypoxic Respiratory failure which is Acute.  she is not on home oxygen. Supplemental oxygen was provided and noted- Oxygen Concentration (%):  [50] 50.   Signs/symptoms of respiratory failure include- respiratory distress. Contributing diagnoses includes - pulmonary edema Labs and images were reviewed. Patient Has recent ABG, which has been reviewed. Will treat underlying causes and adjust management of respiratory failure as follows:    Supplemental O2 via nasal cannula PRN  Wean O2 as tolerated  Continuous pulse ox    Nephrotic syndrome  Follows nephrology outpatient  Nephrology consulted    Anxiety  Continue venlafaxine    Restrictive lung disease  Unclear etiology  Considering pulmonology consult      Type 1 diabetes mellitus with end-stage renal disease (ESRD)  Home regimen: insulin detemir 10 u am & 12 u qhs + insulin lispro 10 u TIDWM  Last A1c 10.9% (10/23/21)    Detemir 10 u BID  Aspart 3 u TIDWM  LDSSI  Will adjust insulin dosing as appropriate  POCT glucose checks ac/hs  Diabetic/renal diet  Repeat A1c    VTE Risk Mitigation (From admission, onward)         Ordered     " heparin (porcine) injection 5,000 Units  Every 8 hours         05/08/22 1447     heparin (porcine) injection 1,000 Units  As needed (PRN)         05/08/22 1543     IP VTE HIGH RISK PATIENT  Once         05/08/22 1447     Place sequential compression device  Until discontinued         05/08/22 1447     Place sequential compression device  Until discontinued         05/08/22 1419                   Jimbo Simms MD PGY-1  Department of Hospital Medicine   [facility]

## 2022-05-08 NOTE — HPI
Ms. Read is a 26 yo female with history of uncontrolled T1DM c/b ESRD on HD and retinopathy, hypertension who presents with sudden onset hemoptysis. Patient states she was sleeping this morning and was suddenly awakened by a cough with bright red blood. The volume of blood was not remarkable, but there was more than just streaks present. She also endorses nausea, vomiting, dizziness and generalized weakness. She continued to feel poorly and have these bloody coughing episodes, so her parents brought her to the ED. She describes a similar episode last January (2022) for which she states resolved with HD. Denies shortness of breath, chest pain, palpitations, abdominal pain, urinary or bowel complaints, headaches, extremity weakness, fever/chills.     Patient's receives HD TThSat. Her last session was the day prior to ED presentation (5/7/22), and states ~3 L fluid removed. Patient is ambulatory at baseline and independent in ADLs. She lives with her parents. Denies tobacco, alcohol, and illicit substance use.    ED course: Patient presented afebrile, tachycardic (), and in hypertensive urgency (/92). Triage SpO2 73%, initially placed on BiPAP initially with improved respiratory status, weaned to nasal cannula. Administered home BP meds and placed on nitroglycerin gtt with BP improvement to SBP 120s, in the 160s upon evaluation. Weaned off gtt. ECG revealed sinus tachycardia. Labs reveal no leukocytosis, HGB 12.1 (patient does have BL HGB ~8.0). Electrolytes WNL. Glucose 255. sCr 5.1 with ESRD. Troponin negative, BNP elevated at 2095. Lactate elevated at 2.69. CXR revealed bilateral pulmonary edema.     Patient to be admitted to hospital medicine for further evaluation and work-up.

## 2022-05-08 NOTE — ASSESSMENT & PLAN NOTE
Lab Results   Component Value Date    HGBA1C 8.3 (H) 05/08/2022     Blood glucose 500 this afternoon (5/8)-pending ICU eval   -Plan per primary team

## 2022-05-08 NOTE — ASSESSMENT & PLAN NOTE
"HGB stable  Hemoptysis a/w flash pulmonary edema    Follow CTA  Repeat lactate  RUQ U/S  PT/INR  Vitamin K level  See "hypertensive emergency" plan  "

## 2022-05-08 NOTE — NURSING
Patient came up to the unit from the ED with a glucose over 500. I asked the ED nurse ARIELLA Franco what the sugar was and she said they were going by the blood work and it was almost 300 with nurse not giving any coverage. Patient is a Type 1 diabetic. Dialysis nurse at bedside. ARIELLA Garg gave 8 units of insulin and BG is coming down. BG is in the 300's right now. Patient is alert, awake, and oriented. Will continue to monitor patient.

## 2022-05-08 NOTE — ASSESSMENT & PLAN NOTE
Patient with Hypoxic Respiratory failure which is Acute.  she is not on home oxygen. Supplemental oxygen was provided and noted- Oxygen Concentration (%):  [50] 50.   Signs/symptoms of respiratory failure include- respiratory distress. Contributing diagnoses includes - pulmonary edema Labs and images were reviewed. Patient Has recent ABG, which has been reviewed. Will treat underlying causes and adjust management of respiratory failure as follows:    Supplemental O2 via nasal cannula PRN  Wean O2 as tolerated  Continuous pulse ox

## 2022-05-08 NOTE — MEDICAL/APP STUDENT
[facility] - Emergency Dept  Emergency Medicine  History & Physical      Patient Name: Isabela Read  MRN: [id number]  Admission Date: 5/8/2022  Attending Physician: MOSHE Ferrara MD  Primary Care Provider: Tavo Bhatia MD         Patient information was obtained from patient and ER records.     Subjective:     Principal Problem: Hemophthisis.     Chief Complaint:   Chief Complaint   Patient presents with    Shortness of Breath        HPI: Ms. Read, a 27 y.o with PHM of T1DM, CKD in hemodialysis and HTN. Presents to the ED for spontaneous hemophthisis. Said she was sleeping and woke up coughing, the noticed bright red spots, not very abundant. She also complaints of nausea, vomiting, dizziness and weakness. Denies dyspnea, palpitations, abdominal pain, headaches, melens, bloody stools or fever. Continued to have hemophthisis episodes, last one in early morning.  Describes similar episodes before, last one in January 2022, says she presented to the hospital where it resolved after hemodialysis.   Arrived to the ED SO2 73%, with taquicardia and tachypnea, with respiratory distress. She was started on BiPAP with subsequent normalization of O2 saturation. Administered home BP meds and placed on nitroglycerin infusion with subsequent BD improvement.   Patient's receives HD TThSat., had last session yesterday (5/7/22), and states ~3 L fluid removed.     Past Medical History:   Diagnosis Date    Anemia     Chronic hypertension with exacerbation during pregnancy in second trimester 11/6/2020    Current regimen (11/6/20):  - Carvedilol 12.5 mg BID - Nifedipine 30 mg daily Baseline CKD + proteinuria    Chronic kidney disease     Depression     Diabetes mellitus     Diabetic retinopathy     Diarrhea     Encounter for blood transfusion     Gastroparesis     Glaucoma     Hepatomegaly 4/29/2021    Hx of psychiatric care     Hyperlipidemia     Hypertension     Nephrotic syndrome     Nephrotic syndrome  3/4/2021    Palpitations     Poor fetal growth affecting management of mother in second trimester 10/15/2020    Restrictive lung disease     Retinal detachment     Severe pre-eclampsia in second trimester 11/6/2020       Past Surgical History:   Procedure Laterality Date    AV FISTULA PLACEMENT Left 4/7/2021    Procedure: CREATION, AV FISTULA;  Surgeon: Roberto Ryan MD;  Location: [facility] OR 2ND FLR;  Service: Peripheral Vascular;  Laterality: Left;    COLONOSCOPY N/A 3/16/2022    Procedure: COLONOSCOPY;  Surgeon: Tavo Kwok MD;  Location: [facility] (4TH FLR);  Service: Endoscopy;  Laterality: N/A;  Questionable history of delayed gastric emptying longstanding diabetes now on eating pre transplant workup for history of nausea vomiting which seems to have improved with dialysis also chronic diarrhea and history of anemia pre transplant workup for kidney transplant. 3    ESOPHAGOGASTRODUODENOSCOPY N/A 3/16/2022    Procedure: EGD (ESOPHAGOGASTRODUODENOSCOPY);  Surgeon: Tavo Kwok MD;  Location: [facility] (4TH FLR);  Service: Endoscopy;  Laterality: N/A;  Questionable history of delayed gastric emptying longstanding diabetes now on eating pre transplant workup for history of nausea vomiting which seems to have improved with dialysis also chronic diarrhea and history of anemia pre transplant workup for    FISTULOGRAM N/A 8/11/2021    Procedure: Fistulogram;  Surgeon: Roberto Ryan MD;  Location: [facility] CATH LAB;  Service: Cardiology;  Laterality: N/A;    PERCUTANEOUS TRANSLUMINAL ANGIOPLASTY OF ARTERIOVENOUS FISTULA N/A 8/11/2021    Procedure: PTA, AV FISTULA;  Surgeon: Roberto Ryan MD;  Location: [facility] CATH LAB;  Service: Cardiology;  Laterality: N/A;    REMOVAL IMPLANT, POSTERIOR SEGMENT, INTRAOCULAR Right 2/1/2022    Procedure: REMOVAL IMPLANT, POSTERIOR SEGMENT, INTRAOCULAR;  Surgeon: Maximilian Montaño MD;  Location: [facility] OR 1ST FLR;  Service: Ophthalmology;   Laterality: Right;    REPAIR OF RETINAL DETACHMENT WITH VITRECTOMY Right 1/25/2022    Procedure: REPAIR, RETINAL DETACHMENT, WITH VITRECTOMY, MEMBRANE PEEL, LASER, INJECTION OF GAS VS OIL;  Surgeon: Maximilian Montaño MD;  Location: [facility] OR 1ST FLR;  Service: Ophthalmology;  Laterality: Right;    REVISION OF ARTERIOVENOUS FISTULA Left 12/10/2021    Procedure: REVISION, AV FISTULA with BVT;  Surgeon: Roberto Ryan MD;  Location: [facility] OR 2ND FLR;  Service: Peripheral Vascular;  Laterality: Left;    VITRECTOMY BY PARS PLANA APPROACH Right 2/1/2022    Procedure: VITRECTOMY, PARS PLANA APPROACH;  Surgeon: Maximilian Montaño MD;  Location: [facility] OR 1ST FLR;  Service: Ophthalmology;  Laterality: Right;       Review of patient's allergies indicates:  No Known Allergies    Current Facility-Administered Medications on File Prior to Encounter   Medication    0.9%  NaCl infusion     Current Outpatient Medications on File Prior to Encounter   Medication Sig    acetaminophen (TYLENOL) 325 MG tablet Take 1 tablet (325 mg total) by mouth every 6 (six) hours as needed for Pain.    aspirin (ECOTRIN) 81 MG EC tablet Take 81 mg by mouth once daily.    blood sugar diagnostic Strp To check BG 4 - 6 times daily, to use with insurance preferred meter    calcitRIOL (ROCALTROL) 0.5 MCG Cap Take 0.5 mcg by mouth once daily.    carvediloL (COREG) 25 MG tablet Take 25 mg by mouth 2 (two) times daily with meals.    ferrous sulfate 325 (65 FE) MG EC tablet Take 1 tablet (325 mg total) by mouth 2 (two) times daily.    flash glucose scanning reader (FREESTYLE MARCY 14 DAY READER) Misc 1 each by Misc.(Non-Drug; Combo Route) route once daily.    flash glucose sensor (FREESTYLE MARCY 14 DAY SENSOR) Kit 6 kits by Misc.(Non-Drug; Combo Route) route once daily.    hydrALAZINE (APRESOLINE) 25 MG tablet Take 2 tablets (50 mg total) by mouth every 8 (eight) hours.    insulin detemir U-100 (LEVEMIR FLEXTOUCH) 100 unit/mL (3 mL) SubQ  "InPn pen 10 units subcutaneously in the morning and 12 units sq at night    insulin lispro (HUMALOG KWIKPEN INSULIN) 100 unit/mL pen Inject 10 units into skin the skin 3 three times daily with meals    lancets Misc To check BG 4 - 6 times daily, to use with insurance preferred meter    medroxyPROGESTERone (DEPO-PROVERA) 150 mg/mL Syrg Inject 1 mL (150 mg total) into the muscle once. for 1 dose    NIFEdipine (PROCARDIA-XL) 30 MG (OSM) 24 hr tablet Take 60 mg by mouth once daily.    pen needle, diabetic 32 gauge x 5/32" Ndle For three times daily injection    rosuvastatin (CRESTOR) 20 MG tablet Take 1 tablet (20 mg total) by mouth every evening.    sevelamer carbonate (RENVELA) 800 mg Tab TAKE 1 TABLET BY MOUTH THREE TIMES DAILY WITH MEALS    traZODone (DESYREL) 50 MG tablet Take 1 tablet (50 mg total) by mouth nightly as needed for Insomnia.    venlafaxine (EFFEXOR XR) 37.5 MG 24 hr capsule Take 1 capsule (37.5 mg total) by mouth once daily.     Family History     Problem Relation (Age of Onset)    Diabetes Brother    Heart disease Father    Hypertension Mother        Tobacco Use    Smoking status: Never Smoker    Smokeless tobacco: Never Used   Substance and Sexual Activity    Alcohol use: No    Drug use: No    Sexual activity: Not Currently     Partners: Male     Comment: monogamous     Review of Systems   Constitutional: Negative.  Negative for chills, diaphoresis and fever.   HENT: Negative.    Eyes: Negative.  Negative for photophobia and visual disturbance.   Respiratory: Positive for cough. Negative for choking, chest tightness and shortness of breath.    Cardiovascular: Negative.  Negative for chest pain, palpitations and leg swelling.   Gastrointestinal: Positive for nausea and vomiting. Negative for abdominal distention, abdominal pain, anal bleeding, blood in stool and diarrhea.   Endocrine: Negative.    Genitourinary: Positive for decreased urine volume (1 x day). Negative for difficulty " urinating, hematuria and vaginal bleeding.   Musculoskeletal: Negative.  Negative for arthralgias and myalgias.   Skin: Negative for color change, pallor and wound.   Neurological: Negative for dizziness, tremors, speech difficulty, weakness and headaches.        Somnolent   Hematological: Negative.    Psychiatric/Behavioral: Negative.      Objective:     Vital Signs (Most Recent):  Temp: 97 °F (36.1 °C) (05/08/22 1559)  Pulse: 108 (05/08/22 1700)  Resp: 16 (05/08/22 1559)  BP: (!) 161/74 (05/08/22 1700)  SpO2: (!) 94 % (05/08/22 1559) Vital Signs (24h Range):  Temp:  [97 °F (36.1 °C)-99.2 °F (37.3 °C)] 97 °F (36.1 °C)  Pulse:  [106-187] 108  Resp:  [16-44] 16  SpO2:  [73 %-100 %] 94 %  BP: (138-184)/(59-92) 161/74     Weight: 61.6 kg (135 lb 12.9 oz)  Body mass index is 23.31 kg/m².    Physical Exam  Constitutional:       Appearance: She is not ill-appearing.   HENT:      Mouth/Throat:      Mouth: Mucous membranes are moist.   Eyes:      Extraocular Movements: Extraocular movements intact.      Comments: Horizontal nistagmus.    Cardiovascular:      Rate and Rhythm: Regular rhythm. Tachycardia present.      Pulses: Normal pulses.      Heart sounds: Normal heart sounds. No murmur heard.  Pulmonary:      Breath sounds: Rales (bilateral) present. No rhonchi.   Chest:      Chest wall: No tenderness.   Abdominal:      Palpations: Abdomen is soft.   Musculoskeletal:         General: No swelling. Normal range of motion.      Cervical back: No rigidity.      Comments: AV Fistula in left arm with good thrill.    Lymphadenopathy:      Cervical: No cervical adenopathy.   Skin:     General: Skin is warm.      Capillary Refill: Capillary refill takes less than 2 seconds.      Findings: Lesion present.   Neurological:      General: No focal deficit present.      Mental Status: She is oriented to person, place, and time.      Sensory: No sensory deficit.        Significant Labs:   All pertinent labs within the past 24 hours have  been reviewed.  CBC:   Recent Labs   Lab 05/08/22 0815 05/08/22 0817   WBC 12.19  --    HGB 12.3  --    HCT 39.3 44     --      CMP:   Recent Labs   Lab 05/08/22 0815      K 4.8   CL 95   CO2 25   *   BUN 29*   CREATININE 5.1*   CALCIUM 9.1   PROT 7.8   ALBUMIN 3.8   BILITOT 1.1*   ALKPHOS 106   AST 24   ALT 20   ANIONGAP 17*   EGFRNONAA 10.8*     Troponin:   Recent Labs   Lab 05/08/22 0815   TROPONINI 0.013       Significant Imaging: I have reviewed all pertinent imaging results/findings within the past 24 hours.       CTA Chest Non Coronary   Final Result   Abnormal      1. No evidence of pulmonary thromboembolism, pulmonary infarct, or right heart strain.   2. Diffuse ground-glass opacities throughout both lungs with interlobular septal thickening which nonspecific may reflect pulmonary edema, hemorrhage, or infectious process with considerations including fungal and viral organisms.   3. Minimal contrast opacification of the superior vena cava proximal to the SVC/left brachiocephalic vein junction consistent with high-grade narrowing or near complete obstruction.   4. Mediastinal and hilar lymphadenopathy, nonspecific.   This report was flagged in Epic as abnormal.         Electronically signed by: Maximilian Granados   Date:    05/08/2022   Time:    15:38      X-Ray Chest 1 View   Final Result      Diffuse bilateral alveolar and interstitial opacities could relate to edema, infection, noninfectious inflammation, hemorrhage, or combination of the above.         Electronically signed by: Jmi España   Date:    05/08/2022   Time:    09:06      US Abdomen Limited    (Results Pending)       Assessment/Plan:   Ms Read, 27 y.o with acute onset of hemophthisis, suspected flash pulmonary edema vs alveolar hemorrhage.     Hemophthisis:   CTA Chest to rule out PE  Supplementary O2 goal SpO2 >92%    Consult to Pulomonology   Follow CBC  RUQ U/S  PT/INR  Vitamin K level    Hypertensive emergency  CTA  obtained with no evident pulmonary embolism or right heart strain. Diffuse GGO bilaterally, sugestive of high-grade narrowing vs near complete obstruction of SVC proximal to SVC/left brachiocephalic vein junction; nonspecific mediastinal and hilar LAD.    IV Lasix inyection   Nephrology was consulted: hemodialysis today. Resume home BP medications: Coreg 25 mg BID, hydralazine 25 mg 50 mg q8h, nifedipine 60 mg daily  Strict I/O's    Acute hypoxemic respiratory failure  Supplemental O2 via nasal cannula PRN  Wean O2 as tolerated  Continuous pulse ox    Type 1 Diabtes Mellitus   Hemoglobin A1C   Date Value Ref Range Status   05/08/2022 8.3 (H) 4.0 - 5.6 % Final     Comment:     ADA Screening Guidelines:  5.7-6.4%  Consistent with prediabetes  >or=6.5%  Consistent with diabetes    High levels of fetal hemoglobin interfere with the HbA1C  assay. Heterozygous hemoglobin variants (HbS, HgC, etc)do  not significantly interfere with this assay.   However, presence of multiple variants may affect accuracy.     10/23/2021 10.9 (H) 4.0 - 5.6 % Final     Comment:     ADA Screening Guidelines:  5.7-6.4%  Consistent with prediabetes  >or=6.5%  Consistent with diabetes    High levels of fetal hemoglobin interfere with the HbA1C  assay. Heterozygous hemoglobin variants (HbS, HgC, etc)do  not significantly interfere with this assay.   However, presence of multiple variants may affect accuracy.     09/17/2021 10.2 (H) 4.0 - 5.6 % Final     Comment:     ADA Screening Guidelines:  5.7-6.4%  Consistent with prediabetes  >or=6.5%  Consistent with diabetes    High levels of fetal hemoglobin interfere with the HbA1C  assay. Heterozygous hemoglobin variants (HbS, HgC, etc)do  not significantly interfere with this assay.   However, presence of multiple variants may affect accuracy.       Home regimen: Detemir 10 UI in the morning and 12 UI at night + Humalog 10 UI with every meal.  Start Detemir 10 u BID + Aspart 3 u TIDWM + LDSSI  Repeat  A1c      Active Diagnoses:    Diagnosis Date Noted POA    PRINCIPAL PROBLEM:  Hypertensive emergency [I16.1] 03/04/2021 Yes    Insomnia [G47.00] 05/08/2022 Yes    Hemoptysis [R04.2] 05/08/2022 Yes    ESRD (end stage renal disease) [N18.6] 10/22/2021 Yes    Acute hypoxemic respiratory failure [J96.01] 09/17/2021 Yes    Nephrotic syndrome [N04.9] 03/04/2021 Yes    Anxiety [F41.9] 11/07/2020 Yes    Restrictive lung disease [J98.4] 08/04/2020 Yes    Type 1 diabetes mellitus with end-stage renal disease (ESRD) [E10.22, N18.6] 02/24/2015 Yes      Problems Resolved During this Admission:     VTE Risk Mitigation (From admission, onward)         Ordered     heparin (porcine) injection 5,000 Units  Every 8 hours         05/08/22 1447     heparin (porcine) injection 1,000 Units  As needed (PRN)         05/08/22 1543     IP VTE HIGH RISK PATIENT  Once         05/08/22 1447     Place sequential compression device  Until discontinued         05/08/22 1447     Place sequential compression device  Until discontinued         05/08/22 1419                  Regina Connell  Department of Hospital Medicine   [facility]

## 2022-05-08 NOTE — HPI
"Isabela Read is a 27 y.o. female w/ PMHx ESRD 2/2  long standing Type I DM (since age 8), chronic HTN (since teen years), Restrictive lung disease,hepatomegaly, anemia, anxiety, angle clousure glaucoma (R eye), and pregnancy loss (at 23 wks gestation, 2020), admitted on 5/8/2022 for evaluation and management of shortness of breath       Pt states that she woke up around 0400 this morning and was coughing up blood-tinged sputum, felt nauseous and overall didn't feel well, she knew that if she stayed home she was going to get sicker since she cant teat and take her medications when she's nauseous. She was very concerned about "coughing up blood" and so she decided to be seen in the hospital.  She went to her usual dialysis session yesterday and had about 3.2L of fluid removed, she didn't have any complications, she also states not missing any dialysis sessions last week.   She was not able to take her blood pressure medications today d/t nausea, she also has not injected any insulin and her fasting glucose levels was in the 60's which is not her usual readings.     Pt denies: Fever, chills, chest pain, palpitations, vomiting, diarrhea, abd pain, headaches, visual disturbances or weakness.          In the ED, pt presented with the following VS:   Initial Vitals [05/08/22 0759]   BP Pulse Resp Temp SpO2   (!) 184/92 (!) 127 (!) 44 98.5 °F (36.9 °C) (!) 73 %      MAP       --         In the ED her she was tested for COVID-19 and this was negative, CBC showed a WBC 12.19, Hgb 12.3 and plts 187, her chemistry showed K 4.8, bicarb 25, glucose 255, BUN 29, and Cr 5.1, troponin was negative, her BNP was 2095. Pt was placed temporarily on BiPAP. Her CXR showed diffuse madelyn alveolar and interstitial opacities and d/t her acute presentation, tachycardia , along with increased work of breathing a CTA chest with contrast was ordered, this was negative for PE, but showed the following:   "Diffuse ground-glass opacities " "throughout both lungs with interlobular septal thickening which nonspecific may reflect pulmonary edema, hemorrhage, or infectious process with considerations including fungal and viral organisms"      Nephrology was consulted for: "HD, hypervolemia"    Outpatient HD Information:  -Dialysis modality: Hemodialysis  -Outpatient HD unit: [facility]   -Nephrologist: Dr. Camilo   -HD TX days: Tuesday/Thursday/Saturday, treatment time 3.5 hrs   -Last HD TX prior to hospital admission: 05/07/2022  -Dialysis access: LUE AV fistula  -Residual urine: oliguric   -EDW: 63kg     "

## 2022-05-08 NOTE — CONSULTS
"[facility] Surg  Nephrology  Consult Note    Patient Name: Isabela Read  MRN: [id number]  Admission Date: 5/8/2022  Hospital Length of Stay: 0 days  Attending Provider: Chantal Ferrara MD   Primary Care Physician: Tavo Bhatia MD  Principal Problem:Hypertensive emergency    Inpatient consult to Nephrology  Consult performed by: Jie Reyes MD  Consult ordered by: Jimbo Simms MD  Reason for consult: "HD, hypervolemia"  Assessment/Recommendations: Please see consult note and staff attestation for full recommendations. Thank you!         Subjective:     HPI:   Isabela Read is a 27 y.o. female w/ PMHx ESRD 2/2  long standing Type I DM (since age 8), chronic HTN (since teen years), Restrictive lung disease,hepatomegaly, anemia, anxiety, angle clousure glaucoma (R eye), and pregnancy loss (at 23 wks gestation, 2020), admitted on 5/8/2022 for evaluation and management of shortness of breath       Pt states that she woke up around 0400 this morning and was coughing up blood-tinged sputum, felt nauseous and overall didn't feel well, she knew that if she stayed home she was going to get sicker since she cant teat and take her medications when she's nauseous. She was very concerned about "coughing up blood" and so she decided to be seen in the hospital.  She went to her usual dialysis session yesterday and had about 3.2L of fluid removed, she didn't have any complications, she also states not missing any dialysis sessions last week.   She was not able to take her blood pressure medications today d/t nausea, she also has not injected any insulin and her fasting glucose levels was in the 60's which is not her usual readings.     Pt denies: Fever, chills, chest pain, palpitations, vomiting, diarrhea, abd pain, headaches, visual disturbances or weakness.          In the ED, pt presented with the following VS:   Initial Vitals [05/08/22 0759]   BP Pulse Resp Temp SpO2   (!) 184/92 (!) 127 (!) 44 98.5 " "°F (36.9 °C) (!) 73 %      MAP       --         In the ED her she was tested for COVID-19 and this was negative, CBC showed a WBC 12.19, Hgb 12.3 and plts 187, her chemistry showed K 4.8, bicarb 25, glucose 255, BUN 29, and Cr 5.1, troponin was negative, her BNP was 2095. Pt was placed temporarily on BiPAP. Her CXR showed diffuse madelyn alveolar and interstitial opacities and d/t her acute presentation, tachycardia , along with increased work of breathing a CTA chest with contrast was ordered, this was negative for PE, but showed the following:   "Diffuse ground-glass opacities throughout both lungs with interlobular septal thickening which nonspecific may reflect pulmonary edema, hemorrhage, or infectious process with considerations including fungal and viral organisms"      Nephrology was consulted for: "HD, hypervolemia"    Outpatient HD Information:  -Dialysis modality: Hemodialysis  -Outpatient HD unit: [place]   -Nephrologist: Dr. Camilo   -HD TX days: Tuesday/Thursday/Saturday, treatment time 3.5 hrs   -Last HD TX prior to hospital admission: 05/07/2022  -Dialysis access: LUE AV fistula  -Residual urine: oliguric   -EDW: 63kg         Past Medical History:   Diagnosis Date    Anemia     Chronic hypertension with exacerbation during pregnancy in second trimester 11/6/2020    Current regimen (11/6/20):  - Carvedilol 12.5 mg BID - Nifedipine 30 mg daily Baseline CKD + proteinuria    Chronic kidney disease     Depression     Diabetes mellitus     Diabetic retinopathy     Diarrhea     Encounter for blood transfusion     Gastroparesis     Glaucoma     Hepatomegaly 4/29/2021    Hx of psychiatric care     Hyperlipidemia     Hypertension     Nephrotic syndrome     Nephrotic syndrome 3/4/2021    Palpitations     Poor fetal growth affecting management of mother in second trimester 10/15/2020    Restrictive lung disease     Retinal detachment     Severe pre-eclampsia in second trimester 11/6/2020 "       Past Surgical History:   Procedure Laterality Date    AV FISTULA PLACEMENT Left 4/7/2021    Procedure: CREATION, AV FISTULA;  Surgeon: Roberto Ryan MD;  Location: [facility] OR 2ND FLR;  Service: Peripheral Vascular;  Laterality: Left;    COLONOSCOPY N/A 3/16/2022    Procedure: COLONOSCOPY;  Surgeon: Tavo Kwok MD;  Location: [facility] ENDO (4TH FLR);  Service: Endoscopy;  Laterality: N/A;  Questionable history of delayed gastric emptying longstanding diabetes now on eating pre transplant workup for history of nausea vomiting which seems to have improved with dialysis also chronic diarrhea and history of anemia pre transplant workup for kidney transplant. 3    ESOPHAGOGASTRODUODENOSCOPY N/A 3/16/2022    Procedure: EGD (ESOPHAGOGASTRODUODENOSCOPY);  Surgeon: Tavo Kwok MD;  Location: [facility] ENDO (4TH FLR);  Service: Endoscopy;  Laterality: N/A;  Questionable history of delayed gastric emptying longstanding diabetes now on eating pre transplant workup for history of nausea vomiting which seems to have improved with dialysis also chronic diarrhea and history of anemia pre transplant workup for    FISTULOGRAM N/A 8/11/2021    Procedure: Fistulogram;  Surgeon: Roberto Ryan MD;  Location: [facility] CATH LAB;  Service: Cardiology;  Laterality: N/A;    PERCUTANEOUS TRANSLUMINAL ANGIOPLASTY OF ARTERIOVENOUS FISTULA N/A 8/11/2021    Procedure: PTA, AV FISTULA;  Surgeon: Roberto Ryan MD;  Location: [facility] CATH LAB;  Service: Cardiology;  Laterality: N/A;    REMOVAL IMPLANT, POSTERIOR SEGMENT, INTRAOCULAR Right 2/1/2022    Procedure: REMOVAL IMPLANT, POSTERIOR SEGMENT, INTRAOCULAR;  Surgeon: Maximilian Montaño MD;  Location: [facility] OR 1ST FLR;  Service: Ophthalmology;  Laterality: Right;    REPAIR OF RETINAL DETACHMENT WITH VITRECTOMY Right 1/25/2022    Procedure: REPAIR, RETINAL DETACHMENT, WITH VITRECTOMY, MEMBRANE PEEL, LASER, INJECTION OF GAS VS OIL;  Surgeon: Maximilian Montaño MD;   Location: [facility] OR 1ST FLR;  Service: Ophthalmology;  Laterality: Right;    REVISION OF ARTERIOVENOUS FISTULA Left 12/10/2021    Procedure: REVISION, AV FISTULA with BVT;  Surgeon: Roberto Ryan MD;  Location: [facility] OR 2ND FLR;  Service: Peripheral Vascular;  Laterality: Left;    VITRECTOMY BY PARS PLANA APPROACH Right 2/1/2022    Procedure: VITRECTOMY, PARS PLANA APPROACH;  Surgeon: Maximilian Montaño MD;  [facility];  Service: Ophthalmology;  Laterality: Right;       Review of patient's allergies indicates:  No Known Allergies  Current Facility-Administered Medications   Medication Frequency    0.9%  NaCl infusion Once    acetaminophen tablet 650 mg Q4H PRN    aspirin EC tablet 81 mg Daily    [START ON 5/9/2022] atorvastatin tablet 80 mg Daily    calcitRIOL capsule 0.5 mcg Daily    carvediloL tablet 25 mg BID WM    dextrose 10% bolus 125 mL PRN    dextrose 10% bolus 250 mL PRN    glucagon (human recombinant) injection 1 mg PRN    glucose chewable tablet 16 g PRN    glucose chewable tablet 24 g PRN    heparin (porcine) injection 1,000 Units PRN    [START ON 5/9/2022] heparin (porcine) injection 5,000 Units Q8H    hydrALAZINE tablet 50 mg Q8H    insulin aspart U-100 pen 0-5 Units QID (AC + HS) PRN    insulin aspart U-100 pen 3 Units TIDWM    insulin detemir U-100 pen 10 Units BID    melatonin tablet 6 mg Nightly PRN    naloxone 0.4 mg/mL injection 0.02 mg PRN    NIFEdipine 24 hr tablet 60 mg Daily    ondansetron injection 4 mg Q8H PRN    polyethylene glycol packet 17 g BID PRN    senna-docusate 8.6-50 mg per tablet 1 tablet Daily PRN    sevelamer carbonate tablet 800 mg TID WM    sodium chloride 0.9% bolus 250 mL PRN    sodium chloride 0.9% flush 10 mL PRN    sodium chloride 0.9% flush 10 mL Q12H PRN    traZODone tablet 50 mg Nightly PRN    venlafaxine 24 hr capsule 37.5 mg Daily     Facility-Administered Medications Ordered in Other Encounters   Medication  Frequency    0.9%  NaCl infusion Continuous     Family History       Problem Relation (Age of Onset)    Diabetes Brother    Heart disease Father    Hypertension Mother          Tobacco Use    Smoking status: Never Smoker    Smokeless tobacco: Never Used   Substance and Sexual Activity    Alcohol use: No    Drug use: No    Sexual activity: Not Currently     Partners: Male     Comment: monogamous     Review of Systems  Objective:     Vital Signs (Most Recent):  Temp: 97 °F (36.1 °C) (05/08/22 1559)  Pulse: 105 (05/08/22 1815)  Resp: 16 (05/08/22 1715)  BP: (!) 176/86 (05/08/22 1815)  SpO2: (!) 94 % (05/08/22 1559)  O2 Device (Oxygen Therapy): nasal cannula w/ humidification (05/08/22 1715)   Vital Signs (24h Range):  Temp:  [97 °F (36.1 °C)-99.2 °F (37.3 °C)] 97 °F (36.1 °C)  Pulse:  [105-187] 105  Resp:  [16-44] 16  SpO2:  [73 %-100 %] 94 %  BP: (138-184)/(59-92) 176/86     Weight: 61.6 kg (135 lb 12.9 oz) (05/08/22 0759)  Body mass index is 23.31 kg/m².  Body surface area is 1.67 meters squared.    No intake/output data recorded.    Physical Exam  Vitals and nursing note reviewed.   Constitutional:       General: She is not in acute distress.     Appearance: She is ill-appearing. She is not toxic-appearing or diaphoretic.   Cardiovascular:      Rate and Rhythm: Regular rhythm. Tachycardia present.      Pulses: Normal pulses.      Heart sounds: Murmur heard.      Comments: L UE AVF +thrill, bruit   Pulmonary:      Effort: No respiratory distress.      Breath sounds: Rales present. No wheezing or rhonchi.      Comments: Decreased air movement in posterior lower lobes  +rales and crackles throughout all lung fields R > L   Abdominal:      General: Abdomen is flat. Bowel sounds are normal. There is no distension.      Palpations: Abdomen is soft. There is no mass.      Tenderness: There is no abdominal tenderness. There is no guarding or rebound.   Musculoskeletal:         General: No swelling, tenderness or  "deformity.      Right lower leg: Edema present.      Left lower leg: Edema present.      Comments: Trace LE edema    Skin:     General: Skin is warm.      Capillary Refill: Capillary refill takes 2 to 3 seconds.      Coloration: Skin is not jaundiced or pale.      Findings: No bruising, erythema, lesion or rash.   Neurological:      Mental Status: She is alert and oriented to person, place, and time.       Significant Labs:  Cardiac Markers: No results for input(s): CKMB, TROPONINT, MYOGLOBIN in the last 168 hours.  CBC:   Recent Labs   Lab 05/08/22 0815 05/08/22  0817   WBC 12.19  --    RBC 4.07  --    HGB 12.3  --    HCT 39.3 44     --    MCV 97  --    MCH 30.2  --    MCHC 31.3*  --      CMP:   Recent Labs   Lab 05/08/22 0815   *   CALCIUM 9.1   ALBUMIN 3.8   PROT 7.8      K 4.8   CO2 25   CL 95   BUN 29*   CREATININE 5.1*   ALKPHOS 106   ALT 20   AST 24   BILITOT 1.1*     Coagulation:   Recent Labs   Lab 05/08/22  1606   INR 1.2     LFTs:   Recent Labs   Lab 05/08/22  0815   ALT 20   AST 24   ALKPHOS 106   BILITOT 1.1*   PROT 7.8   ALBUMIN 3.8     Microbiology Results (last 7 days)       ** No results found for the last 168 hours. **          All labs within the past 24 hours have been reviewed.    Significant Imaging:  Labs: Reviewed  X-Ray: Reviewed  CT: Reviewed    Assessment/Plan:     * Hypertensive emergency  Hydralazine given in the ED   -Plan per primary team       Hemoptysis  CTA chest w/ contrast neg for PE, however it showed:   "Diffuse ground-glass opacities throughout both lungs with interlobular septal thickening which nonspecific may reflect pulmonary edema, hemorrhage, or infectious process with considerations including fungal and viral organisms"    -Plan per primary team       ESRD (end stage renal disease)  2/2 DM type I and severe chronic HTN   Outpatient HD Information:  -Dialysis modality: Hemodialysis  -Outpatient HD unit: [place]   -Nephrologist: Dr. Camilo   -HD TX " days: Tuesday/Thursday/Saturday, treatment time 3.5 hrs   -Last HD TX prior to hospital admission: 05/07/2022  -Dialysis access: LUE AV fistula  -Residual urine: oliguric   -EDW: 63kg     5/8 Contrast exposure for CTA chest     Plan/Recommendations:     -iHD today (05/08/2022) with UF -2 to 3L as BP tolerates for metabolic clearance and volume management   -Electrolytes: K 4.8 acceptable  -Mineral & Bone Disorder:   Check Phosphorus level   -ESKD anemia: hgb 12.3 , goal hgb 10-11g/dL, iron infusions/EPO not indicated  -Renal function panel daily   -Renal diet with protein supplements, if not NPO   -Strict I/O's and daily weights      Type 1 diabetes mellitus with end-stage renal disease (ESRD)  Lab Results   Component Value Date    HGBA1C 8.3 (H) 05/08/2022     Blood glucose 500 this afternoon (5/8)-pending ICU eval   -Plan per primary team           Thank you for your consult. I will follow-up with patient. Please contact us if you have any additional questions.    Jie Reyes MD  Nephrology  [facility] - Med Surg

## 2022-05-08 NOTE — ED TRIAGE NOTES
Reports feeling SOB and coughing around 4AM. Arrived in triage sats in 70s. Coughing bright red blood and weakness. Dialyzed yesterday    LOC: The patient is awake, alert and aware of environment with an appropriate affect, the patient is oriented x 3 and speaking appropriately.  APPEARANCE: Patient resting comfortably and in no acute distress, patient is clean and well groomed, patient's clothing is properly fastened.  SKIN: The skin is warm and dry, color consistent with ethnicity, patient has normal skin turgor and moist mucus membranes, skin intact, no breakdown or bruising noted.  MUSCULOSKELETAL: Patient moving all extremities spontaneously, no obvious swelling or deformities noted.  RESPIRATORY: Airway is open and patent, respirations are spontaneous, crackles heard, no accessory muscle use noted,   CARDIAC: Patient has a normal rate and regular rhythm, no periphreal edema noted, capillary refill < 3 seconds.  ABDOMEN: Soft and non tender to palpation, no distention noted, normoactive bowel sounds present in all four quadrants.  NEUROLOGIC:  facial expression is symmetrical, patient moving all extremities spontaneously, normal sensation in all extremities when touched with a finger.  Follows all commands appropriately.

## 2022-05-08 NOTE — SUBJECTIVE & OBJECTIVE
Past Medical History:   Diagnosis Date    Anemia     Chronic hypertension with exacerbation during pregnancy in second trimester 11/6/2020    Current regimen (11/6/20):  - Carvedilol 12.5 mg BID - Nifedipine 30 mg daily Baseline CKD + proteinuria    Chronic kidney disease     Depression     Diabetes mellitus     Diabetic retinopathy     Diarrhea     Encounter for blood transfusion     Gastroparesis     Glaucoma     Hepatomegaly 4/29/2021    Hx of psychiatric care     Hyperlipidemia     Hypertension     Nephrotic syndrome     Nephrotic syndrome 3/4/2021    Palpitations     Poor fetal growth affecting management of mother in second trimester 10/15/2020    Restrictive lung disease     Retinal detachment     Severe pre-eclampsia in second trimester 11/6/2020       Past Surgical History:   Procedure Laterality Date    AV FISTULA PLACEMENT Left 4/7/2021    Procedure: CREATION, AV FISTULA;  Surgeon: Roberto Ryan MD;  Location: [facility] OR 2ND FLR;  Service: Peripheral Vascular;  Laterality: Left;    COLONOSCOPY N/A 3/16/2022    Procedure: COLONOSCOPY;  Surgeon: Tavo Kwok MD;  Location: [facility] (4TH FLR);  Service: Endoscopy;  Laterality: N/A;  Questionable history of delayed gastric emptying longstanding diabetes now on eating pre transplant workup for history of nausea vomiting which seems to have improved with dialysis also chronic diarrhea and history of anemia pre transplant workup for kidney transplant. 3    ESOPHAGOGASTRODUODENOSCOPY N/A 3/16/2022    Procedure: EGD (ESOPHAGOGASTRODUODENOSCOPY);  Surgeon: Tavo Kwok MD;  Location: [facility] (4TH FLR);  Service: Endoscopy;  Laterality: N/A;  Questionable history of delayed gastric emptying longstanding diabetes now on eating pre transplant workup for history of nausea vomiting which seems to have improved with dialysis also chronic diarrhea and history of anemia pre transplant workup for    FISTULOGRAM N/A 8/11/2021    Procedure: Fistulogram;   Surgeon: Roberto Ryan MD;  Location: [facility] CATH LAB;  Service: Cardiology;  Laterality: N/A;    PERCUTANEOUS TRANSLUMINAL ANGIOPLASTY OF ARTERIOVENOUS FISTULA N/A 8/11/2021    Procedure: PTA, AV FISTULA;  Surgeon: Roberto Ryan MD;  Location: [facility] CATH LAB;  Service: Cardiology;  Laterality: N/A;    REMOVAL IMPLANT, POSTERIOR SEGMENT, INTRAOCULAR Right 2/1/2022    Procedure: REMOVAL IMPLANT, POSTERIOR SEGMENT, INTRAOCULAR;  Surgeon: Maximilian Montaño MD;  Location: [facility] OR [address] [place] [address];  Service: Ophthalmology;  Laterality: Right;    REPAIR OF RETINAL DETACHMENT WITH VITRECTOMY Right 1/25/2022    Procedure: REPAIR, RETINAL DETACHMENT, WITH VITRECTOMY, MEMBRANE PEEL, LASER, INJECTION OF GAS VS OIL;  Surgeon: Maximilian Montaño MD;  [facility];  Service: Ophthalmology;  Laterality: Right;    REVISION OF ARTERIOVENOUS FISTULA Left 12/10/2021    Procedure: REVISION, AV FISTULA with BVT;  Surgeon: Roberto Ryan MD;  Location: [facility] OR 2ND FLR;  Service: Peripheral Vascular;  Laterality: Left;    VITRECTOMY BY PARS PLANA APPROACH Right 2/1/2022    Procedure: VITRECTOMY, PARS PLANA APPROACH;  Surgeon: Maximilian Montaño MD;  Location: [facility] OR [address] [place] [address];  Service: Ophthalmology;  Laterality: Right;       Review of patient's allergies indicates:  No Known Allergies  Current Facility-Administered Medications   Medication Frequency    0.9%  NaCl infusion Once    acetaminophen tablet 650 mg Q4H PRN    aspirin EC tablet 81 mg Daily    [START ON 5/9/2022] atorvastatin tablet 80 mg Daily    calcitRIOL capsule 0.5 mcg Daily    carvediloL tablet 25 mg BID WM    dextrose 10% bolus 125 mL PRN    dextrose 10% bolus 250 mL PRN    glucagon (human recombinant) injection 1 mg PRN    glucose chewable tablet 16 g PRN    glucose chewable tablet 24 g PRN    heparin (porcine) injection 1,000 Units PRN    [START ON 5/9/2022] heparin (porcine) injection 5,000 Units Q8H    hydrALAZINE tablet 50 mg Q8H    insulin  aspart U-100 pen 0-5 Units QID (AC + HS) PRN    insulin aspart U-100 pen 3 Units TIDWM    insulin detemir U-100 pen 10 Units BID    melatonin tablet 6 mg Nightly PRN    naloxone 0.4 mg/mL injection 0.02 mg PRN    NIFEdipine 24 hr tablet 60 mg Daily    ondansetron injection 4 mg Q8H PRN    polyethylene glycol packet 17 g BID PRN    senna-docusate 8.6-50 mg per tablet 1 tablet Daily PRN    sevelamer carbonate tablet 800 mg TID WM    sodium chloride 0.9% bolus 250 mL PRN    sodium chloride 0.9% flush 10 mL PRN    sodium chloride 0.9% flush 10 mL Q12H PRN    traZODone tablet 50 mg Nightly PRN    venlafaxine 24 hr capsule 37.5 mg Daily     Facility-Administered Medications Ordered in Other Encounters   Medication Frequency    0.9%  NaCl infusion Continuous     Family History       Problem Relation (Age of Onset)    Diabetes Brother    Heart disease Father    Hypertension Mother          Tobacco Use    Smoking status: Never Smoker    Smokeless tobacco: Never Used   Substance and Sexual Activity    Alcohol use: No    Drug use: No    Sexual activity: Not Currently     Partners: Male     Comment: monogamous     Review of Systems  Objective:     Vital Signs (Most Recent):  Temp: 97 °F (36.1 °C) (05/08/22 1559)  Pulse: 105 (05/08/22 1815)  Resp: 16 (05/08/22 1715)  BP: (!) 176/86 (05/08/22 1815)  SpO2: (!) 94 % (05/08/22 1559)  O2 Device (Oxygen Therapy): nasal cannula w/ humidification (05/08/22 1715)   Vital Signs (24h Range):  Temp:  [97 °F (36.1 °C)-99.2 °F (37.3 °C)] 97 °F (36.1 °C)  Pulse:  [105-187] 105  Resp:  [16-44] 16  SpO2:  [73 %-100 %] 94 %  BP: (138-184)/(59-92) 176/86     Weight: 61.6 kg (135 lb 12.9 oz) (05/08/22 0759)  Body mass index is 23.31 kg/m².  Body surface area is 1.67 meters squared.    No intake/output data recorded.    Physical Exam  Vitals and nursing note reviewed.   Constitutional:       General: She is not in acute distress.     Appearance: She is ill-appearing. She is not toxic-appearing or  diaphoretic.   Cardiovascular:      Rate and Rhythm: Regular rhythm. Tachycardia present.      Pulses: Normal pulses.      Heart sounds: Murmur heard.      Comments: L UE AVF +thrill, bruit   Pulmonary:      Effort: No respiratory distress.      Breath sounds: Rales present. No wheezing or rhonchi.      Comments: Decreased air movement in posterior lower lobes  +rales and crackles throughout all lung fields R > L   Abdominal:      General: Abdomen is flat. Bowel sounds are normal. There is no distension.      Palpations: Abdomen is soft. There is no mass.      Tenderness: There is no abdominal tenderness. There is no guarding or rebound.   Musculoskeletal:         General: No swelling, tenderness or deformity.      Right lower leg: Edema present.      Left lower leg: Edema present.      Comments: Trace LE edema    Skin:     General: Skin is warm.      Capillary Refill: Capillary refill takes 2 to 3 seconds.      Coloration: Skin is not jaundiced or pale.      Findings: No bruising, erythema, lesion or rash.   Neurological:      Mental Status: She is alert and oriented to person, place, and time.       Significant Labs:  Cardiac Markers: No results for input(s): CKMB, TROPONINT, MYOGLOBIN in the last 168 hours.  CBC:   Recent Labs   Lab 05/08/22  0815 05/08/22  0817   WBC 12.19  --    RBC 4.07  --    HGB 12.3  --    HCT 39.3 44     --    MCV 97  --    MCH 30.2  --    MCHC 31.3*  --      CMP:   Recent Labs   Lab 05/08/22  0815   *   CALCIUM 9.1   ALBUMIN 3.8   PROT 7.8      K 4.8   CO2 25   CL 95   BUN 29*   CREATININE 5.1*   ALKPHOS 106   ALT 20   AST 24   BILITOT 1.1*     Coagulation:   Recent Labs   Lab 05/08/22  1606   INR 1.2     LFTs:   Recent Labs   Lab 05/08/22  0815   ALT 20   AST 24   ALKPHOS 106   BILITOT 1.1*   PROT 7.8   ALBUMIN 3.8     Microbiology Results (last 7 days)       ** No results found for the last 168 hours. **          All labs within the past 24 hours have been  reviewed.    Significant Imaging:  Labs: Reviewed  X-Ray: Reviewed  CT: Reviewed

## 2022-05-08 NOTE — ASSESSMENT & PLAN NOTE
2/2 DM type I and severe chronic HTN   Outpatient HD Information:  -Dialysis modality: Hemodialysis  -Outpatient HD unit: [place]   -Nephrologist: Dr. Camilo   -HD TX days: Tuesday/Thursday/Saturday, treatment time 3.5 hrs   -Last HD TX prior to hospital admission: 05/07/2022  -Dialysis access: LUE AV fistula  -Residual urine: oliguric   -EDW: 63kg     5/8 Contrast exposure for CTA chest     Plan/Recommendations:     -iHD today (05/08/2022) with UF -2 to 3L as BP tolerates for metabolic clearance and volume management   -Electrolytes: K 4.8 acceptable  -Mineral & Bone Disorder:   Check Phosphorus level   -ESKD anemia: hgb 12.3 , goal hgb 10-11g/dL, iron infusions/EPO not indicated  -Renal function panel daily   -Renal diet with protein supplements, if not NPO   -Strict I/O's and daily weights

## 2022-05-09 PROBLEM — G47.00 INSOMNIA: Chronic | Status: ACTIVE | Noted: 2022-05-08

## 2022-05-09 PROBLEM — J98.4 RESTRICTIVE LUNG DISEASE: Chronic | Status: ACTIVE | Noted: 2020-08-04

## 2022-05-09 PROBLEM — N18.6 ESRD (END STAGE RENAL DISEASE): Chronic | Status: ACTIVE | Noted: 2021-10-22

## 2022-05-09 PROBLEM — F41.9 ANXIETY: Chronic | Status: ACTIVE | Noted: 2020-11-07

## 2022-05-09 PROBLEM — N04.9 NEPHROTIC SYNDROME: Chronic | Status: ACTIVE | Noted: 2021-03-04

## 2022-05-09 PROBLEM — R04.2 HEMOPTYSIS: Chronic | Status: ACTIVE | Noted: 2022-05-08

## 2022-05-09 LAB
ALBUMIN SERPL BCP-MCNC: 2.9 G/DL (ref 3.5–5.2)
ALBUMIN SERPL BCP-MCNC: 3.1 G/DL (ref 3.5–5.2)
ALBUMIN SERPL BCP-MCNC: 3.1 G/DL (ref 3.5–5.2)
ALP SERPL-CCNC: 87 U/L (ref 55–135)
ALP SERPL-CCNC: 89 U/L (ref 55–135)
ALP SERPL-CCNC: 89 U/L (ref 55–135)
ALT SERPL W/O P-5'-P-CCNC: 12 U/L (ref 10–44)
ALT SERPL W/O P-5'-P-CCNC: 12 U/L (ref 10–44)
ALT SERPL W/O P-5'-P-CCNC: 15 U/L (ref 10–44)
ANION GAP SERPL CALC-SCNC: 11 MMOL/L (ref 8–16)
ANION GAP SERPL CALC-SCNC: 13 MMOL/L (ref 8–16)
ANION GAP SERPL CALC-SCNC: 15 MMOL/L (ref 8–16)
AST SERPL-CCNC: 17 U/L (ref 10–40)
AST SERPL-CCNC: 18 U/L (ref 10–40)
AST SERPL-CCNC: 23 U/L (ref 10–40)
BASOPHILS # BLD AUTO: 0.03 K/UL (ref 0–0.2)
BASOPHILS NFR BLD: 0.3 % (ref 0–1.9)
BILIRUB SERPL-MCNC: 1.6 MG/DL (ref 0.1–1)
BUN SERPL-MCNC: 16 MG/DL (ref 6–20)
BUN SERPL-MCNC: 21 MG/DL (ref 6–20)
BUN SERPL-MCNC: 25 MG/DL (ref 6–20)
CALCIUM SERPL-MCNC: 8.6 MG/DL (ref 8.7–10.5)
CALCIUM SERPL-MCNC: 8.6 MG/DL (ref 8.7–10.5)
CALCIUM SERPL-MCNC: 8.8 MG/DL (ref 8.7–10.5)
CHLORIDE SERPL-SCNC: 97 MMOL/L (ref 95–110)
CHLORIDE SERPL-SCNC: 98 MMOL/L (ref 95–110)
CHLORIDE SERPL-SCNC: 98 MMOL/L (ref 95–110)
CO2 SERPL-SCNC: 20 MMOL/L (ref 23–29)
CO2 SERPL-SCNC: 24 MMOL/L (ref 23–29)
CO2 SERPL-SCNC: 26 MMOL/L (ref 23–29)
CREAT SERPL-MCNC: 3.5 MG/DL (ref 0.5–1.4)
CREAT SERPL-MCNC: 4 MG/DL (ref 0.5–1.4)
CREAT SERPL-MCNC: 4.6 MG/DL (ref 0.5–1.4)
DIFFERENTIAL METHOD: ABNORMAL
EOSINOPHIL # BLD AUTO: 0 K/UL (ref 0–0.5)
EOSINOPHIL NFR BLD: 0.4 % (ref 0–8)
ERYTHROCYTE [DISTWIDTH] IN BLOOD BY AUTOMATED COUNT: 17.7 % (ref 11.5–14.5)
EST. GFR  (AFRICAN AMERICAN): 14.1 ML/MIN/1.73 M^2
EST. GFR  (AFRICAN AMERICAN): 16.7 ML/MIN/1.73 M^2
EST. GFR  (AFRICAN AMERICAN): 19.6 ML/MIN/1.73 M^2
EST. GFR  (NON AFRICAN AMERICAN): 12.2 ML/MIN/1.73 M^2
EST. GFR  (NON AFRICAN AMERICAN): 14.5 ML/MIN/1.73 M^2
EST. GFR  (NON AFRICAN AMERICAN): 17 ML/MIN/1.73 M^2
GLUCOSE SERPL-MCNC: 220 MG/DL (ref 70–110)
GLUCOSE SERPL-MCNC: 249 MG/DL (ref 70–110)
GLUCOSE SERPL-MCNC: 321 MG/DL (ref 70–110)
HCT VFR BLD AUTO: 33 % (ref 37–48.5)
HGB BLD-MCNC: 10.4 G/DL (ref 12–16)
IMM GRANULOCYTES # BLD AUTO: 0.02 K/UL (ref 0–0.04)
IMM GRANULOCYTES NFR BLD AUTO: 0.2 % (ref 0–0.5)
LYMPHOCYTES # BLD AUTO: 1.3 K/UL (ref 1–4.8)
LYMPHOCYTES NFR BLD: 14.1 % (ref 18–48)
MAGNESIUM SERPL-MCNC: 2.3 MG/DL (ref 1.6–2.6)
MCH RBC QN AUTO: 29.2 PG (ref 27–31)
MCHC RBC AUTO-ENTMCNC: 31.5 G/DL (ref 32–36)
MCV RBC AUTO: 93 FL (ref 82–98)
MONOCYTES # BLD AUTO: 0.5 K/UL (ref 0.3–1)
MONOCYTES NFR BLD: 5.1 % (ref 4–15)
NEUTROPHILS # BLD AUTO: 7.6 K/UL (ref 1.8–7.7)
NEUTROPHILS NFR BLD: 79.9 % (ref 38–73)
NRBC BLD-RTO: 0 /100 WBC
PHOSPHATE SERPL-MCNC: 4.4 MG/DL (ref 2.7–4.5)
PLATELET # BLD AUTO: 165 K/UL (ref 150–450)
PMV BLD AUTO: 10.9 FL (ref 9.2–12.9)
POCT GLUCOSE: 149 MG/DL (ref 70–110)
POCT GLUCOSE: 236 MG/DL (ref 70–110)
POCT GLUCOSE: 248 MG/DL (ref 70–110)
POCT GLUCOSE: 272 MG/DL (ref 70–110)
POCT GLUCOSE: 300 MG/DL (ref 70–110)
POTASSIUM SERPL-SCNC: 4.6 MMOL/L (ref 3.5–5.1)
POTASSIUM SERPL-SCNC: 4.8 MMOL/L (ref 3.5–5.1)
POTASSIUM SERPL-SCNC: 4.9 MMOL/L (ref 3.5–5.1)
PROT SERPL-MCNC: 6.3 G/DL (ref 6–8.4)
PROT SERPL-MCNC: 6.6 G/DL (ref 6–8.4)
PROT SERPL-MCNC: 6.6 G/DL (ref 6–8.4)
RBC # BLD AUTO: 3.56 M/UL (ref 4–5.4)
SODIUM SERPL-SCNC: 133 MMOL/L (ref 136–145)
SODIUM SERPL-SCNC: 133 MMOL/L (ref 136–145)
SODIUM SERPL-SCNC: 136 MMOL/L (ref 136–145)
WBC # BLD AUTO: 9.45 K/UL (ref 3.9–12.7)

## 2022-05-09 PROCEDURE — 84100 ASSAY OF PHOSPHORUS: CPT | Mod: NTX

## 2022-05-09 PROCEDURE — 25000003 PHARM REV CODE 250: Mod: NTX

## 2022-05-09 PROCEDURE — 36415 COLL VENOUS BLD VENIPUNCTURE: CPT | Mod: NTX

## 2022-05-09 PROCEDURE — 80053 COMPREHEN METABOLIC PANEL: CPT | Mod: 91,NTX | Performed by: STUDENT IN AN ORGANIZED HEALTH CARE EDUCATION/TRAINING PROGRAM

## 2022-05-09 PROCEDURE — 63600175 PHARM REV CODE 636 W HCPCS: Mod: NTX

## 2022-05-09 PROCEDURE — C9399 UNCLASSIFIED DRUGS OR BIOLOG: HCPCS | Mod: NTX | Performed by: STUDENT IN AN ORGANIZED HEALTH CARE EDUCATION/TRAINING PROGRAM

## 2022-05-09 PROCEDURE — 99233 PR SUBSEQUENT HOSPITAL CARE,LEVL III: ICD-10-PCS | Mod: NTX,,, | Performed by: INTERNAL MEDICINE

## 2022-05-09 PROCEDURE — 99233 SBSQ HOSP IP/OBS HIGH 50: CPT | Mod: NTX,,, | Performed by: INTERNAL MEDICINE

## 2022-05-09 PROCEDURE — 11000001 HC ACUTE MED/SURG PRIVATE ROOM: Mod: NTX

## 2022-05-09 PROCEDURE — 83735 ASSAY OF MAGNESIUM: CPT | Mod: NTX

## 2022-05-09 PROCEDURE — 85025 COMPLETE CBC W/AUTO DIFF WBC: CPT | Mod: NTX

## 2022-05-09 PROCEDURE — 25000003 PHARM REV CODE 250: Mod: NTX | Performed by: STUDENT IN AN ORGANIZED HEALTH CARE EDUCATION/TRAINING PROGRAM

## 2022-05-09 RX ORDER — VALSARTAN 160 MG/1
160 TABLET ORAL DAILY
Status: ON HOLD | COMMUNITY
End: 2022-05-09 | Stop reason: SDUPTHER

## 2022-05-09 RX ORDER — VALSARTAN 160 MG/1
160 TABLET ORAL DAILY
Start: 2022-05-09 | End: 2022-05-25

## 2022-05-09 RX ORDER — ROSUVASTATIN CALCIUM 10 MG/1
10 TABLET, COATED ORAL DAILY
Status: ON HOLD | COMMUNITY
End: 2022-05-09 | Stop reason: HOSPADM

## 2022-05-09 RX ORDER — HYDRALAZINE HYDROCHLORIDE 25 MG/1
50 TABLET, FILM COATED ORAL EVERY 8 HOURS
Qty: 90 TABLET | Refills: 3 | Status: ON HOLD | OUTPATIENT
Start: 2022-05-09 | End: 2022-09-15 | Stop reason: SDUPTHER

## 2022-05-09 RX ORDER — FLUTICASONE PROPIONATE 50 MCG
1 SPRAY, SUSPENSION (ML) NASAL DAILY PRN
Status: ON HOLD | COMMUNITY
End: 2022-11-08 | Stop reason: HOSPADM

## 2022-05-09 RX ORDER — SODIUM CHLORIDE 9 MG/ML
INJECTION, SOLUTION INTRAVENOUS ONCE
Status: DISCONTINUED | OUTPATIENT
Start: 2022-05-10 | End: 2022-05-10 | Stop reason: HOSPADM

## 2022-05-09 RX ADMIN — HEPARIN SODIUM 5000 UNITS: 5000 INJECTION INTRAVENOUS; SUBCUTANEOUS at 05:05

## 2022-05-09 RX ADMIN — ASPIRIN 81 MG: 81 TABLET, COATED ORAL at 08:05

## 2022-05-09 RX ADMIN — INSULIN DETEMIR 10 UNITS: 100 INJECTION, SOLUTION SUBCUTANEOUS at 08:05

## 2022-05-09 RX ADMIN — NIFEDIPINE 60 MG: 60 TABLET, FILM COATED, EXTENDED RELEASE ORAL at 08:05

## 2022-05-09 RX ADMIN — CARVEDILOL 25 MG: 25 TABLET, FILM COATED ORAL at 08:05

## 2022-05-09 RX ADMIN — INSULIN ASPART 3 UNITS: 100 INJECTION, SOLUTION INTRAVENOUS; SUBCUTANEOUS at 11:05

## 2022-05-09 RX ADMIN — CARVEDILOL 25 MG: 25 TABLET, FILM COATED ORAL at 04:05

## 2022-05-09 RX ADMIN — INSULIN ASPART 3 UNITS: 100 INJECTION, SOLUTION INTRAVENOUS; SUBCUTANEOUS at 04:05

## 2022-05-09 RX ADMIN — INSULIN ASPART 3 UNITS: 100 INJECTION, SOLUTION INTRAVENOUS; SUBCUTANEOUS at 08:05

## 2022-05-09 RX ADMIN — HEPARIN SODIUM 5000 UNITS: 5000 INJECTION INTRAVENOUS; SUBCUTANEOUS at 02:05

## 2022-05-09 RX ADMIN — HYDRALAZINE HYDROCHLORIDE 50 MG: 50 TABLET ORAL at 05:05

## 2022-05-09 RX ADMIN — INSULIN ASPART 2 UNITS: 100 INJECTION, SOLUTION INTRAVENOUS; SUBCUTANEOUS at 12:05

## 2022-05-09 RX ADMIN — HEPARIN SODIUM 5000 UNITS: 5000 INJECTION INTRAVENOUS; SUBCUTANEOUS at 09:05

## 2022-05-09 RX ADMIN — CALCITRIOL 0.5 MCG: 0.5 CAPSULE, LIQUID FILLED ORAL at 08:05

## 2022-05-09 RX ADMIN — INSULIN ASPART 2 UNITS: 100 INJECTION, SOLUTION INTRAVENOUS; SUBCUTANEOUS at 04:05

## 2022-05-09 RX ADMIN — HYDRALAZINE HYDROCHLORIDE 50 MG: 50 TABLET ORAL at 09:05

## 2022-05-09 RX ADMIN — SEVELAMER CARBONATE 800 MG: 800 TABLET, FILM COATED ORAL at 04:05

## 2022-05-09 RX ADMIN — SEVELAMER CARBONATE 800 MG: 800 TABLET, FILM COATED ORAL at 12:05

## 2022-05-09 RX ADMIN — INSULIN DETEMIR 12 UNITS: 100 INJECTION, SOLUTION SUBCUTANEOUS at 09:05

## 2022-05-09 RX ADMIN — HYDRALAZINE HYDROCHLORIDE 50 MG: 50 TABLET ORAL at 02:05

## 2022-05-09 RX ADMIN — ATORVASTATIN CALCIUM 80 MG: 20 TABLET, FILM COATED ORAL at 08:05

## 2022-05-09 RX ADMIN — SEVELAMER CARBONATE 800 MG: 800 TABLET, FILM COATED ORAL at 08:05

## 2022-05-09 RX ADMIN — VENLAFAXINE HYDROCHLORIDE 37.5 MG: 37.5 CAPSULE, EXTENDED RELEASE ORAL at 08:05

## 2022-05-09 NOTE — PLAN OF CARE
[facility] - Med Surg  Initial Discharge Assessment       Primary Care Provider: Tavo Bhatia MD    Admission Diagnosis: Shortness of breath [R06.02]  Acute pulmonary edema [J81.0]  Chest pain [R07.9]  Hypertensive emergency [I16.1]    Admission Date: 5/8/2022  Expected Discharge Date: 5/9/2022    Discharge Barriers Identified: None    Payor: [employer]ARE / Plan: [employer] / Product Type: Medicare Advantage /     Extended Emergency Contact Information  Primary Emergency Contact: Kiersten Read  Mobile Phone: [phone]  Relation: Mother    Discharge Plan A: Home  Discharge Plan B: Home with family      [facility] [address] [place] [address] [address] [place] [id number]  Phone: [phone] Fax: [phone]      Initial Assessment (most recent)     Adult Discharge Assessment - 05/09/22 1211        Discharge Assessment    Assessment Type Discharge Planning Assessment     Confirmed/corrected address, phone number and insurance Yes     Confirmed Demographics Correct on Facesheet     Source of Information patient     When was your last doctors appointment? 04/18/22     Does patient/caregiver understand observation status Yes     Communicated TITA with patient/caregiver Yes     Reason For Admission SOB/Coughing     Lives With alone     Facility Arrived From: Home     Do you expect to return to your current living situation? Yes     Do you have help at home or someone to help you manage your care at home? Yes     Who are your caregiver(s) and their phone number(s)? Mother Miramontes     Prior to hospitilization cognitive status: Alert/Oriented;No Deficits     Current cognitive status: Alert/Oriented;No Deficits     Equipment Currently Used at Home none     Readmission within 30 days? No     Patient currently being followed by outpatient case management? No     Do you currently have service(s) that help you manage your care at  home? No     Do you take prescription medications? Yes     Do you have prescription coverage? Yes     Do you have any problems affording any of your prescribed medications? No     Is the patient taking medications as prescribed? yes     Who is going to help you get home at discharge? Family     How do you get to doctors appointments? car, drives self     Dialysis Name and Scheduled days [facility]     Do you take coumadin? No     Discharge Plan A Home     Discharge Plan B Home with family     DME Needed Upon Discharge  none     Discharge Plan discussed with: Patient     Discharge Barriers Identified None

## 2022-05-09 NOTE — ASSESSMENT & PLAN NOTE
Receives HD outpatient T/Th/Sat via left arm AVF. Last HD day prior to presentation (5/7/22), ~3L removed.    - Nephrology consulted for urgent HD and inpatient HD needs   - last HD 5/9 with 3L removed  - continue calcitriol and sevelamer

## 2022-05-09 NOTE — PLAN OF CARE
Patient in bed resting quietly, awake and alert oriented to room and call bell system, bed in low position, patient educated and instructed on use of Incentive Spirometer, will continue monitor.    Problem: Adult Inpatient Plan of Care  Goal: Optimal Comfort and Wellbeing  Outcome: Ongoing, Progressing  Intervention: Monitor Pain and Promote Comfort  Flowsheets (Taken 5/9/2022 1756)  Pain Management Interventions:   pain management plan reviewed with patient/caregiver   medication offered but refused     Problem: Diabetes Comorbidity  Goal: Blood Glucose Level Within Targeted Range  Outcome: Ongoing, Progressing  Intervention: Monitor and Manage Glycemia  Flowsheets (Taken 5/9/2022 1756)  Glycemic Management:   blood glucose monitored   oral hydration promoted   supplemental insulin given

## 2022-05-09 NOTE — SUBJECTIVE & OBJECTIVE
Interval History: No acute events overnight. Received HD yesterday with 2.5L removed. This morning patient with no complaints, no further episodes of hemoptysis and feels well. Has been able to eat.    Review of Systems   Constitutional:  Negative for activity change, chills, diaphoresis, fatigue and fever.   HENT:  Negative for congestion, rhinorrhea, sore throat and trouble swallowing.    Eyes:  Negative for photophobia, redness and visual disturbance.   Respiratory:  Negative for cough, shortness of breath and wheezing.    Cardiovascular:  Negative for chest pain, palpitations and leg swelling.   Gastrointestinal:  Positive for nausea and vomiting. Negative for abdominal pain, constipation and diarrhea.   Endocrine: Negative for polydipsia, polyphagia and polyuria.   Genitourinary:  Positive for decreased urine volume (baseline, urinates ~1x/day). Negative for difficulty urinating, dysuria and hematuria.   Musculoskeletal:  Negative for back pain and myalgias.   Skin:  Negative for rash and wound.   Neurological:  Negative for dizziness, syncope, speech difficulty, weakness, light-headedness and headaches.   Psychiatric/Behavioral:  Negative for agitation, confusion and hallucinations.    Objective:     Vital Signs (Most Recent):  Temp: 98.5 °F (36.9 °C) (05/09/22 0824)  Pulse: 86 (05/09/22 0824)  Resp: 16 (05/09/22 0824)  BP: 121/65 (05/09/22 0824)  SpO2: 95 % (05/09/22 0824) Vital Signs (24h Range):  Temp:  [97 °F (36.1 °C)-99.2 °F (37.3 °C)] 98.5 °F (36.9 °C)  Pulse:  [] 86  Resp:  [16-36] 16  SpO2:  [94 %-100 %] 95 %  BP: (108-178)/(56-89) 121/65     Weight: 61.6 kg (135 lb 12.9 oz)  Body mass index is 23.31 kg/m².    Intake/Output Summary (Last 24 hours) at 5/9/2022 1054  Last data filed at 5/9/2022 0400  Gross per 24 hour   Intake 250 ml   Output 3000 ml   Net -2750 ml      Physical Exam  Vitals and nursing note reviewed.   Constitutional:       General: She is not in acute distress.     Appearance:  She is ill-appearing.      Interventions: Nasal cannula in place.   HENT:      Head: Normocephalic and atraumatic.      Comments: Alopecia present (chronic)     Right Ear: External ear normal.      Left Ear: External ear normal.      Nose: Nose normal.      Mouth/Throat:      Mouth: Mucous membranes are moist.      Pharynx: Oropharynx is clear. No oropharyngeal exudate.   Eyes:      General: No visual field deficit.     Extraocular Movements: Extraocular movements intact.      Conjunctiva/sclera: Conjunctivae normal.      Comments: Horizontal nystagmus elicited on EOM testing   Neck:      Comments: JVD present  Cardiovascular:      Rate and Rhythm: Normal rate and regular rhythm.      Pulses: Normal pulses.      Heart sounds: Murmur heard.   Pulmonary:      Effort: Pulmonary effort is normal. No respiratory distress.      Breath sounds: Normal breath sounds. No wheezing, rhonchi or rales.      Comments: NC in place  Abdominal:      Palpations: Abdomen is soft. There is no mass.      Tenderness: There is no abdominal tenderness. There is no guarding.   Musculoskeletal:         General: Normal range of motion.      Cervical back: Normal range of motion.      Right lower leg: No edema.      Left lower leg: No edema.   Skin:     General: Skin is warm.      Capillary Refill: Capillary refill takes less than 2 seconds.   Neurological:      Mental Status: She is alert and oriented to person, place, and time.      Sensory: No sensory deficit.      Motor: No weakness.   Psychiatric:         Mood and Affect: Mood normal.         Behavior: Behavior normal. Behavior is cooperative.       Significant Labs: All pertinent labs within the past 24 hours have been reviewed.    Significant Imaging: I have reviewed all pertinent imaging results/findings within the past 24 hours.

## 2022-05-09 NOTE — ASSESSMENT & PLAN NOTE
27 year old woman with uncontrolled T1DM c/b ESRD on HD and retinopathy, HTN who presented with hypertensive emergency. Had hemoptysis prior to admit concerning for flash pulmonary edema. Troponin negative, BNP 2095. CXR revealed bilateral alveolar pulmonary edema. Initially required nitro gtt for hypertension to SBP 180s, weaned off. Briefly on BiPAP in ED for satting 70s on room air, weaned to 2L NC. Received IV Lasix 120 mg.    CTA showed no evident pulmonary embolism or right heart strain; diffuse GGO bilaterally with interlobular septal thickening (pulmonary edema vs hemorrhage vs infectious); findings suggestive of high-grade narrowing vs near complete obstruction of SVC proximal to SVC/left brachiocephalic vein junction; nonspecific mediastinal and hilar LAD.    - Nephrology consulted for urgent HD  - continue home BP medications: Coreg 25 mg BID, hydralazine 25 mg 50 mg q8h, nifedipine 60 mg daily  - strict I/Os  - continuous pulse ox

## 2022-05-09 NOTE — PLAN OF CARE
Patient received dialysis at the bedside. Vitals stable. BG checks modified from q2h to 4 times daily. Patient made aware of NPO diet, verbalized understanding. Wheels locked, bed low, call light within reach. Will continue to monitor    Problem: Adult Inpatient Plan of Care  Goal: Plan of Care Review  Outcome: Ongoing, Progressing  Goal: Patient-Specific Goal (Individualized)  Outcome: Ongoing, Progressing  Goal: Absence of Hospital-Acquired Illness or Injury  Outcome: Ongoing, Progressing  Goal: Optimal Comfort and Wellbeing  Outcome: Ongoing, Progressing  Goal: Readiness for Transition of Care  Outcome: Ongoing, Progressing     Problem: Diabetes Comorbidity  Goal: Blood Glucose Level Within Targeted Range  Outcome: Ongoing, Progressing     Problem: Diabetes Comorbidity  Goal: Blood Glucose Level Within Targeted Range  Outcome: Ongoing, Progressing     Problem: Fluid and Electrolyte Imbalance (Acute Kidney Injury/Impairment)  Goal: Fluid and Electrolyte Balance  Outcome: Ongoing, Progressing     Problem: Renal Function Impairment (Acute Kidney Injury/Impairment)  Goal: Effective Renal Function  Outcome: Ongoing, Progressing     Problem: Infection  Goal: Absence of Infection Signs and Symptoms  Outcome: Ongoing, Progressing

## 2022-05-09 NOTE — MEDICAL/APP STUDENT
[facility]  Progress Note    Patient Name: Isabela Read  MRN: [id number]  Patient Class: IP- Inpatient   Admission Date: 5/8/2022  Length of Stay: 1 days  Attending Physician: Chantal Ferrara MD  Primary Care Provider: Tavo Bhatia MD        Subjective:     Principal Problem:Hypertensive emergency    Interval History: Patient says she is feeling better, has not had any new coughing spells, denies dyspnea, nausea or vomiting.     Review of Systems   Constitutional: Negative.  Negative for chills and fever.   HENT: Negative.    Eyes: Negative.    Respiratory: Negative.  Negative for cough and shortness of breath.    Cardiovascular: Negative.    Gastrointestinal: Negative for abdominal pain.   Endocrine: Negative.    Genitourinary: Negative.    Musculoskeletal: Negative.  Negative for back pain.   Skin: Negative.    Neurological: Negative.  Negative for tremors, seizures, syncope and weakness.     Objective:     Vital Signs (Most Recent):  Temp: 97 °F (36.1 °C) (05/09/22 1223)  Pulse: 84 (05/09/22 1223)  Resp: 16 (05/09/22 1223)  BP: 127/72 (05/09/22 1223)  SpO2: (!) 93 % (05/09/22 1223) Vital Signs (24h Range):  Temp:  [97 °F (36.1 °C)-99.2 °F (37.3 °C)] 97 °F (36.1 °C)  Pulse:  [] 84  Resp:  [16-36] 16  SpO2:  [93 %-100 %] 93 %  BP: (108-178)/(56-89) 127/72     Weight: 61.6 kg (135 lb 12.9 oz)  Body mass index is 23.31 kg/m².    Intake/Output Summary (Last 24 hours) at 5/9/2022 1243  Last data filed at 5/9/2022 0400  Gross per 24 hour   Intake 250 ml   Output 3000 ml   Net -2750 ml      Physical Exam  Constitutional:       General: She is not in acute distress.     Appearance: She is not ill-appearing.   HENT:      Mouth/Throat:      Mouth: Mucous membranes are moist.   Eyes:      Pupils: Pupils are equal, round, and reactive to light.   Cardiovascular:      Rate and Rhythm: Normal rate and regular rhythm.      Heart sounds: Normal heart sounds. No murmur  heard.  Pulmonary:      Effort: Pulmonary effort is normal. No respiratory distress.      Breath sounds: No wheezing.   Abdominal:      General: There is no distension.      Palpations: Abdomen is soft.      Tenderness: There is no abdominal tenderness. There is no rebound.   Musculoskeletal:         General: No swelling or tenderness.      Comments: Av fistula on left upper extremity with good thrill.    Skin:     General: Skin is warm.   Neurological:      General: No focal deficit present.      Mental Status: She is alert and oriented to person, place, and time.      Sensory: No sensory deficit.       Overview/Hospital Course: Patient arrived 5/7 with hypertensive crisis and hemophthisis. Had hemodialysis that same dame and was started on IV lasix and subcutaneous insulin. CXR, Chest CH and liver US without acute pathological imaging. Nasal canula with high oxygen requirements, weaning oxygen.     Significant Labs:   All pertinent labs within the past 24 hours have been reviewed.  CBC:   Recent Labs   Lab 05/08/22  0815 05/08/22  0817 05/09/22  0519   WBC 12.19  --  9.45   HGB 12.3  --  10.4*   HCT 39.3 44 33.0*     --  165     CMP:   Recent Labs   Lab 05/08/22  0815 05/08/22  2330 05/09/22  0519    133* 133*   K 4.8 4.6 4.9   CL 95 98 98   CO2 25 20* 24   * 249* 321*   BUN 29* 16 21*   CREATININE 5.1* 3.5* 4.0*   CALCIUM 9.1 8.6* 8.6*   PROT 7.8 6.6 6.6   ALBUMIN 3.8 3.1* 2.9*   BILITOT 1.1* 1.6* 1.6*   ALKPHOS 106 87 89   AST 24 23 18   ALT 20 15 12   ANIONGAP 17* 15 11   EGFRNONAA 10.8* 17.0* 14.5*     POCT Glucose:   Recent Labs   Lab 05/09/22  0007 05/09/22  0746 05/09/22  1136   POCTGLUCOSE 272* 300* 236*       Significant Imaging: I have reviewed all pertinent imaging results/findings within the past 24 hours.    US Abdomen Limited   Final Result      Hepatomegaly.      Additional findings as above.      Electronically signed by resident: Domo Vargas   Date:    05/09/2022    Time:    07:20      Electronically signed by: Uche Tatum   Date:    05/09/2022   Time:    08:44      CTA Chest Non Coronary   Final Result   Abnormal      1. No evidence of pulmonary thromboembolism, pulmonary infarct, or right heart strain.   2. Diffuse ground-glass opacities throughout both lungs with interlobular septal thickening which nonspecific may reflect pulmonary edema, hemorrhage, or infectious process with considerations including fungal and viral organisms.   3. Minimal contrast opacification of the superior vena cava proximal to the SVC/left brachiocephalic vein junction consistent with high-grade narrowing or near complete obstruction.   4. Mediastinal and hilar lymphadenopathy, nonspecific.   This report was flagged in Epic as abnormal.         Electronically signed by: Maximilian Granados   Date:    05/08/2022   Time:    15:38      X-Ray Chest 1 View   Final Result      Diffuse bilateral alveolar and interstitial opacities could relate to edema, infection, noninfectious inflammation, hemorrhage, or combination of the above.         Electronically signed by: Jim España   Date:    05/08/2022   Time:    09:06          Assessment/Plan:   Ms Read, 27 y.o with acute onset of hemophthisis, suspected flash pulmonary edema.      Hemophthisis:   PE ruled out with Chest CT. Associated with flash pulmonary edema.   Supplementary O2 goal SpO2 >92%    Follow CBC  RUQ U/S  PT/INR  Vitamin K level     Hypertensive emergency  CTA obtained with no evident pulmonary embolism or right heart strain. Diffuse GGO bilaterally, sugestive of high-grade narrowing vs near complete obstruction of SVC proximal to SVC/left brachiocephalic vein junction; nonspecific mediastinal and hilar LAD.     Nephrology was consulted: hemodialysis yesterday.   Resume home BP medications: Coreg 25 mg BID, hydralazine 25 mg 50 mg q8h, nifedipine 60 mg daily  Strict I/O's     Acute hypoxemic respiratory failure  Supplemental O2 via nasal  cannula PRN  Wean O2 as tolerated  Continuous pulse ox  Did not tolerate removal of O2, saturation in 80%, has to restart CN 2Lt.   Pulmonary function test.     ESRD (end stage renal disease)  Receives HD outpatient T/Th/Sat via left arm AVF.           - last HD 5/8 with 3L removed  - continue calcitriol and sevelamer     Type 1 Diabtes Mellitus   Home regimen: insulin detemir 10U am & 12U QHS, insulin lispro 10U TIDWM.      - detemir 10U AM + 12U PM  - aspart 3 u TIDWM  - LDSSI  - accuchecks ACHS  - inpatient goal -180  - diabetic diet    Active Diagnoses:    Diagnosis Date Noted POA    PRINCIPAL PROBLEM:  Hypertensive emergency [I16.1] 03/04/2021 Yes    Insomnia [G47.00] 05/08/2022 Yes     Chronic    Hemoptysis [R04.2] 05/08/2022 Yes    ESRD (end stage renal disease) [N18.6] 10/22/2021 Yes     Chronic    Acute hypoxemic respiratory failure [J96.01] 09/17/2021 Yes    Nephrotic syndrome [N04.9] 03/04/2021 Yes     Chronic    Anxiety [F41.9] 11/07/2020 Yes     Chronic    Restrictive lung disease [J98.4] 08/04/2020 Yes     Chronic    Type 1 diabetes mellitus with end-stage renal disease (ESRD) [E10.22, N18.6] 02/24/2015 Yes     Chronic      Problems Resolved During this Admission:     VTE Risk Mitigation (From admission, onward)         Ordered     heparin (porcine) injection 5,000 Units  Every 8 hours         05/08/22 1447     heparin (porcine) injection 1,000 Units  As needed (PRN)         05/08/22 1543     IP VTE HIGH RISK PATIENT  Once         05/08/22 1447     Place sequential compression device  Until discontinued         05/08/22 1447     Place sequential compression device  Until discontinued         05/08/22 1419                Regina Connell  Department of Hospital Medicine   [facility]

## 2022-05-09 NOTE — PROGRESS NOTES
05/08/22 2037   Vital Signs   Pulse 97   BP (!) 157/81        Hemodialysis AV Fistula Left upper arm   No placement date or time found.   Present Prior to Hospital Arrival?: Yes  Location: Left upper arm   Patency Present;Thrill;Bruit   Status Deaccessed   Dressing Status Clean;Dry;Intact   Dressing Gauze   Post-Hemodialysis Assessment   Rinseback Volume (mL) 250 mL   Blood Volume Processed (Liters) 59.4 L   Dialyzer Clearance Lightly streaked   Duration of Treatment 180 minutes   Additional Fluid Intake (mL) 250 mL   Total UF (mL) 3000 mL   Net Fluid Removal 2500   Post-Hemodialysis Comments TX ended. Patient tolerated well and complains of a HA. Primary nurse notified.     HD at the bedside ended. Both needles removed and hemostasis achieved. Net UF removed is 2500 mL. Report given to primary nurse.

## 2022-05-09 NOTE — ASSESSMENT & PLAN NOTE
Associated with flash pulmonary edema. BNP 2K. CTA negative for PE. RUQ US unremarkable. PT/INR WNL.    - monitor Hb and for recurrent episodes of hemoptysis  - see Hypertensive emergency for further plan

## 2022-05-09 NOTE — ASSESSMENT & PLAN NOTE
In ED satting in 70s on room air, briefly on BiPAP. Does not use home oxygen. Likely 2/2 pulmonary edema, improved after HD and BP med administration.     - supplemental O2 via nasal cannula PRN  - wean O2 as tolerated  - continuous pulse ox

## 2022-05-09 NOTE — DISCHARGE INSTRUCTIONS
Change to your blood pressure medicines: In the hospital, your blood pressure was well controlled with hydralazine, Coreg (carvedilol), and nifedipine. Continue taking these medicines and hold off on taking valsartan until you follow up with your primary care doctor to discuss.

## 2022-05-09 NOTE — HOSPITAL COURSE
Patient received HD 5/8 and BP controlled with home meds, no recurrent episodes of hemoptysis since. Insulin regimen adjusted for hyperglycemia. Liver US ordered to evaluate for infiltrative liver disease with homogeneous texture, no CBD dilation. Weaned to room air. Stable for discharge with PCP follow up.

## 2022-05-09 NOTE — ASSESSMENT & PLAN NOTE
Last evaluated by Pulm during 8/2020 admit--her restrictive lung disease at that time was suspected to be 2/2 chronic diastolic dysfunction from poorly controlled HTN. PFTs from 2018 with restrictive pattern, CT chest was unremarkable. Not on home oxygen.

## 2022-05-09 NOTE — PROGRESS NOTES
[facility] Medicine  Progress Note    Patient Name: Isabela Read  MRN: [id number]  Patient Class: IP- Inpatient   Admission Date: 5/8/2022  Length of Stay: 1 days  Attending Physician: Chantal Ferrara MD  Primary Care Provider: Tavo Bhatia MD        Subjective:     Principal Problem:Hypertensive emergency        HPI:  Ms. Read is a 26 yo female with history of uncontrolled T1DM c/b ESRD on HD and retinopathy, hypertension who presents with sudden onset hemoptysis. Patient states she was sleeping this morning and was suddenly awakened by a cough with bright red blood. The volume of blood was not remarkable, but there was more than just streaks present. She also endorses nausea, vomiting, dizziness and generalized weakness. She continued to feel poorly and have these bloody coughing episodes, so her parents brought her to the ED. She describes a similar episode last January (2022) for which she states resolved with HD. Denies shortness of breath, chest pain, palpitations, abdominal pain, urinary or bowel complaints, headaches, extremity weakness, fever/chills.     Patient's receives HD TThSat. Her last session was the day prior to ED presentation (5/7/22), and states ~3 L fluid removed. Patient is ambulatory at baseline and independent in ADLs. She lives with her parents. Denies tobacco, alcohol, and illicit substance use.    ED course: Patient presented afebrile, tachycardic (), and in hypertensive urgency (/92). Triage SpO2 73%, initially placed on BiPAP initially with improved respiratory status, weaned to nasal cannula. Administered home BP meds and placed on nitroglycerin gtt with BP improvement to SBP 120s, in the 160s upon evaluation. Weaned off gtt. ECG revealed sinus tachycardia. Labs reveal no leukocytosis, HGB 12.1 (patient does have BL HGB ~8.0). Electrolytes WNL. Glucose 255. sCr 5.1 with ESRD. Troponin negative, BNP elevated at 2095. Lactate elevated at  2.69. CXR revealed bilateral pulmonary edema.     Patient to be admitted to hospital medicine for further evaluation and work-up.       Overview/Hospital Course:  Patient received HD 5/8 and BP controlled with home meds, no recurrent episodes of hemoptysis since. Insulin regimen adjusted for hyperglycemia. Liver US ordered to evaluate for infiltrative liver disease with homogeneous texture, no CBD dilation. Weaning oxygen.      Interval History: No acute events overnight. Received HD yesterday with 2.5L removed. This morning patient with no complaints, no further episodes of hemoptysis and feels well. Has been able to eat.    Review of Systems   Constitutional:  Negative for activity change, chills, diaphoresis, fatigue and fever.   HENT:  Negative for congestion, rhinorrhea, sore throat and trouble swallowing.    Eyes:  Negative for photophobia, redness and visual disturbance.   Respiratory:  Negative for cough, shortness of breath and wheezing.    Cardiovascular:  Negative for chest pain, palpitations and leg swelling.   Gastrointestinal:  Positive for nausea and vomiting. Negative for abdominal pain, constipation and diarrhea.   Endocrine: Negative for polydipsia, polyphagia and polyuria.   Genitourinary:  Positive for decreased urine volume (baseline, urinates ~1x/day). Negative for difficulty urinating, dysuria and hematuria.   Musculoskeletal:  Negative for back pain and myalgias.   Skin:  Negative for rash and wound.   Neurological:  Negative for dizziness, syncope, speech difficulty, weakness, light-headedness and headaches.   Psychiatric/Behavioral:  Negative for agitation, confusion and hallucinations.    Objective:     Vital Signs (Most Recent):  Temp: 98.5 °F (36.9 °C) (05/09/22 0824)  Pulse: 86 (05/09/22 0824)  Resp: 16 (05/09/22 0824)  BP: 121/65 (05/09/22 0824)  SpO2: 95 % (05/09/22 0824) Vital Signs (24h Range):  Temp:  [97 °F (36.1 °C)-99.2 °F (37.3 °C)] 98.5 °F (36.9 °C)  Pulse:  [] 86  Resp:   [16-36] 16  SpO2:  [94 %-100 %] 95 %  BP: (108-178)/(56-89) 121/65     Weight: 61.6 kg (135 lb 12.9 oz)  Body mass index is 23.31 kg/m².    Intake/Output Summary (Last 24 hours) at 5/9/2022 1054  Last data filed at 5/9/2022 0400  Gross per 24 hour   Intake 250 ml   Output 3000 ml   Net -2750 ml      Physical Exam  Vitals and nursing note reviewed.   Constitutional:       General: She is not in acute distress.     Appearance: She is ill-appearing.      Interventions: Nasal cannula in place.   HENT:      Head: Normocephalic and atraumatic.      Comments: Alopecia present (chronic)     Right Ear: External ear normal.      Left Ear: External ear normal.      Nose: Nose normal.      Mouth/Throat:      Mouth: Mucous membranes are moist.      Pharynx: Oropharynx is clear. No oropharyngeal exudate.   Eyes:      General: No visual field deficit.     Extraocular Movements: Extraocular movements intact.      Conjunctiva/sclera: Conjunctivae normal.      Comments: Horizontal nystagmus elicited on EOM testing   Neck:      Comments: JVD present  Cardiovascular:      Rate and Rhythm: Normal rate and regular rhythm.      Pulses: Normal pulses.      Heart sounds: Murmur heard.   Pulmonary:      Effort: Pulmonary effort is normal. No respiratory distress.      Breath sounds: Normal breath sounds. No wheezing, rhonchi or rales.      Comments: NC in place  Abdominal:      Palpations: Abdomen is soft. There is no mass.      Tenderness: There is no abdominal tenderness. There is no guarding.   Musculoskeletal:         General: Normal range of motion.      Cervical back: Normal range of motion.      Right lower leg: No edema.      Left lower leg: No edema.   Skin:     General: Skin is warm.      Capillary Refill: Capillary refill takes less than 2 seconds.   Neurological:      Mental Status: She is alert and oriented to person, place, and time.      Sensory: No sensory deficit.      Motor: No weakness.   Psychiatric:         Mood and  Affect: Mood normal.         Behavior: Behavior normal. Behavior is cooperative.       Significant Labs: All pertinent labs within the past 24 hours have been reviewed.    Significant Imaging: I have reviewed all pertinent imaging results/findings within the past 24 hours.      Assessment/Plan:      * Hypertensive emergency  27 year old woman with uncontrolled T1DM c/b ESRD on HD and retinopathy, HTN who presented with hypertensive emergency. Had hemoptysis prior to admit concerning for flash pulmonary edema. Troponin negative, BNP 2095. CXR revealed bilateral alveolar pulmonary edema. Initially required nitro gtt for hypertension to SBP 180s, weaned off. Briefly on BiPAP in ED for satting 70s on room air, weaned to 2L NC. Received IV Lasix 120 mg.    CTA showed no evident pulmonary embolism or right heart strain; diffuse GGO bilaterally with interlobular septal thickening (pulmonary edema vs hemorrhage vs infectious); findings suggestive of high-grade narrowing vs near complete obstruction of SVC proximal to SVC/left brachiocephalic vein junction; nonspecific mediastinal and hilar LAD.    - Nephrology consulted for urgent HD  - continue home BP medications: Coreg 25 mg BID, hydralazine 25 mg 50 mg q8h, nifedipine 60 mg daily  - strict I/Os  - continuous pulse ox    Acute hypoxemic respiratory failure  In ED satting in 70s on room air, briefly on BiPAP. Does not use home oxygen. Likely 2/2 pulmonary edema, improved after HD and BP med administration.     - supplemental O2 via nasal cannula PRN  - wean O2 as tolerated  - continuous pulse ox    Hemoptysis  Associated with flash pulmonary edema. BNP 2K. CTA negative for PE. RUQ US unremarkable. PT/INR WNL.    - monitor Hb and for recurrent episodes of hemoptysis  - see Hypertensive emergency for further plan    Insomnia  - continue home trazodone 50 mg PRN QHS      ESRD (end stage renal disease)  Receives HD outpatient T/Th/Sat via left arm AVF. Last HD day prior to  presentation (5/7/22), ~3L removed.    - Nephrology consulted for urgent HD and inpatient HD needs   - last HD 5/9 with 3L removed  - continue calcitriol and sevelamer      Nephrotic syndrome  Albumin 3.1 on admission. Low albumin setting of ESRD and diabetes. Follows with Nephrology outpatient.    - Nephrology consulted -- recommended protein supplement with meals    Anxiety  - continue home venlafaxine 37.5 mg QD    Restrictive lung disease  Last evaluated by Pulm during 8/2020 admit--her restrictive lung disease at that time was suspected to be 2/2 chronic diastolic dysfunction from poorly controlled HTN. PFTs from 2018 with restrictive pattern, CT chest was unremarkable. Not on home oxygen.    Type 1 diabetes mellitus with end-stage renal disease (ESRD)  A1c 8.3. Home regimen: insulin detemir 10U am & 12U QHS, insulin lispro 10U TIDWM.     - detemir 10U AM + 12U PM  - aspart 3 u TIDWM  - LDSSI  - accuchecks ACHS  - inpatient goal -180  - diabetic diet      VTE Risk Mitigation (From admission, onward)         Ordered     heparin (porcine) injection 5,000 Units  Every 8 hours         05/08/22 1447     heparin (porcine) injection 1,000 Units  As needed (PRN)         05/08/22 1543     IP VTE HIGH RISK PATIENT  Once         05/08/22 1447     Place sequential compression device  Until discontinued         05/08/22 1447     Place sequential compression device  Until discontinued         05/08/22 1419                Discharge Planning   TITA: 5/9/2022     Code Status: Full Code   Is the patient medically ready for discharge?:     Reason for patient still in hospital (select all that apply): Patient trending condition and Treatment                 Lashanda Vera MD  Department of Hospital Medicine   [facility]

## 2022-05-09 NOTE — ASSESSMENT & PLAN NOTE
A1c 8.3. Home regimen: insulin detemir 10U am & 12U QHS, insulin lispro 10U TIDWM.     - detemir 10U AM + 12U PM  - aspart 3 u TIDWM  - LDSSI  - accuchecks ACHS  - inpatient goal -180  - diabetic diet

## 2022-05-09 NOTE — PHARMACY MED REC
"Admission Medication History     The home medication history was taken by Sarah Mcneil.    You may go to "Admission" then "Reconcile Home Medications" tabs to review and/or act upon these items.      The home medication list has been updated by the Pharmacy department.    Please read ALL comments highlighted in yellow.    Please address this information as you see fit.     Feel free to contact us if you have any questions or require assistance.      The medications listed below were removed from the home medication list. Please reorder if appropriate:  Patient reports no longer taking the following medication(s):   ACETAMINOPHEN 325MG   ROSUVASTATIN 20MG   FERROUS SULFATE 325MG   MEDROXYPROGESTERONE 150MG/ML    Medications listed below were obtained from: Patient/family  PTA Medications   Medication Sig    aspirin (ECOTRIN) 81 MG EC tablet   Take 81 mg by mouth once daily.    calcitRIOL (ROCALTROL) 0.5 MCG Cap   Take 0.5 mcg by mouth once daily.    carvediloL (COREG) 25 MG tablet   Take 25 mg by mouth 2 (two) times daily with meals.    fluticasone propionate (FLONASE) 50 mcg/actuation nasal spray   1 spray by Each Nostril route daily as needed for Rhinitis or Allergies.    hydrALAZINE (APRESOLINE) 25 MG tablet   Take 25 mg by mouth every 8 (eight) hours.    insulin detemir U-100 (LEVEMIR FLEXTOUCH) 100 unit/mL (3 mL) SubQ InPn pen   Inject 10 units subcutaneously in the morning and 12 units sq at night    insulin lispro (HUMALOG KWIKPEN INSULIN) 100 unit/mL pen   Inject 10 units into skin the skin 3 three times daily with meals    multivit-min/folic acid/biotin (HAIR,SKIN AND NAILS,FA-BIOTIN, ORAL)   Take 1 tablet by mouth once daily.    NIFEdipine (PROCARDIA-XL) 60 MG (OSM) 24 hr tablet   Take 60 mg by mouth 2 (two) times a day.    rosuvastatin (CRESTOR) 10 MG tablet   Take 10 mg by mouth once daily.    sevelamer carbonate (RENVELA) 800 mg Tab   TAKE 1 TABLET BY MOUTH THREE TIMES DAILY WITH " "MEALS    traZODone (DESYREL) 50 MG tablet   Take 1 tablet (50 mg total) by mouth nightly as needed for Insomnia.    valsartan (DIOVAN) 160 MG tablet   Take 160 mg by mouth once daily.    venlafaxine (EFFEXOR XR) 37.5 MG 24 hr capsule   Take 1 capsule (37.5 mg total) by mouth once daily.    blood sugar diagnostic Strp     To check BG 4 - 6 times daily, to use with insurance preferred meter    flash glucose scanning reader (FREESTYLE MARCY 14 DAY READER) Misc   1 each by Misc.(Non-Drug; Combo Route) route once daily.    flash glucose sensor (FREESTYLE MARCY 14 DAY SENSOR) Kit   6 kits by Misc.(Non-Drug; Combo Route) route once daily.    lancets [facility]     To check BG 4 - 6 times daily, to use with insurance preferred meter    pen needle, diabetic 32 gauge x 5/32" Ndle For three times daily injection         Sarah Mcneil  [id number]                  .          "

## 2022-05-09 NOTE — ASSESSMENT & PLAN NOTE
Albumin 3.1 on admission. Low albumin setting of ESRD and diabetes. Follows with Nephrology outpatient.    - Nephrology consulted -- recommended protein supplement with meals

## 2022-05-10 VITALS
HEART RATE: 92 BPM | WEIGHT: 135.81 LBS | SYSTOLIC BLOOD PRESSURE: 154 MMHG | DIASTOLIC BLOOD PRESSURE: 73 MMHG | TEMPERATURE: 98 F | HEIGHT: 64 IN | BODY MASS INDEX: 23.18 KG/M2 | RESPIRATION RATE: 18 BRPM | OXYGEN SATURATION: 100 %

## 2022-05-10 PROBLEM — R04.2 HEMOPTYSIS: Status: RESOLVED | Noted: 2022-05-08 | Resolved: 2022-05-10

## 2022-05-10 PROBLEM — I16.1 HYPERTENSIVE EMERGENCY: Status: RESOLVED | Noted: 2021-03-04 | Resolved: 2022-05-10

## 2022-05-10 PROBLEM — J96.01 ACUTE HYPOXEMIC RESPIRATORY FAILURE: Status: RESOLVED | Noted: 2021-09-17 | Resolved: 2022-05-10

## 2022-05-10 LAB
ALBUMIN SERPL BCP-MCNC: 2.8 G/DL (ref 3.5–5.2)
ALP SERPL-CCNC: 88 U/L (ref 55–135)
ALT SERPL W/O P-5'-P-CCNC: 9 U/L (ref 10–44)
ANION GAP SERPL CALC-SCNC: 12 MMOL/L (ref 8–16)
AST SERPL-CCNC: 12 U/L (ref 10–40)
BASOPHILS # BLD AUTO: 0.03 K/UL (ref 0–0.2)
BASOPHILS NFR BLD: 0.4 % (ref 0–1.9)
BILIRUB SERPL-MCNC: 1.5 MG/DL (ref 0.1–1)
BUN SERPL-MCNC: 35 MG/DL (ref 6–20)
CALCIUM SERPL-MCNC: 9 MG/DL (ref 8.7–10.5)
CHLORIDE SERPL-SCNC: 98 MMOL/L (ref 95–110)
CO2 SERPL-SCNC: 25 MMOL/L (ref 23–29)
CREAT SERPL-MCNC: 5.4 MG/DL (ref 0.5–1.4)
DIFFERENTIAL METHOD: ABNORMAL
EOSINOPHIL # BLD AUTO: 0.3 K/UL (ref 0–0.5)
EOSINOPHIL NFR BLD: 3.6 % (ref 0–8)
ERYTHROCYTE [DISTWIDTH] IN BLOOD BY AUTOMATED COUNT: 17.3 % (ref 11.5–14.5)
EST. GFR  (AFRICAN AMERICAN): 11.6 ML/MIN/1.73 M^2
EST. GFR  (NON AFRICAN AMERICAN): 10.1 ML/MIN/1.73 M^2
GLUCOSE SERPL-MCNC: 175 MG/DL (ref 70–110)
HCT VFR BLD AUTO: 34.8 % (ref 37–48.5)
HCV AB SERPL QL IA: NEGATIVE
HGB BLD-MCNC: 11 G/DL (ref 12–16)
HIV 1+2 AB+HIV1 P24 AG SERPL QL IA: NEGATIVE
IMM GRANULOCYTES # BLD AUTO: 0.03 K/UL (ref 0–0.04)
IMM GRANULOCYTES NFR BLD AUTO: 0.4 % (ref 0–0.5)
LYMPHOCYTES # BLD AUTO: 1.9 K/UL (ref 1–4.8)
LYMPHOCYTES NFR BLD: 26.9 % (ref 18–48)
MAGNESIUM SERPL-MCNC: 2.4 MG/DL (ref 1.6–2.6)
MCH RBC QN AUTO: 29.1 PG (ref 27–31)
MCHC RBC AUTO-ENTMCNC: 31.6 G/DL (ref 32–36)
MCV RBC AUTO: 92 FL (ref 82–98)
MONOCYTES # BLD AUTO: 0.5 K/UL (ref 0.3–1)
MONOCYTES NFR BLD: 7.2 % (ref 4–15)
NEUTROPHILS # BLD AUTO: 4.3 K/UL (ref 1.8–7.7)
NEUTROPHILS NFR BLD: 61.5 % (ref 38–73)
NRBC BLD-RTO: 0 /100 WBC
PHOSPHATE SERPL-MCNC: 5.4 MG/DL (ref 2.7–4.5)
PLATELET # BLD AUTO: 210 K/UL (ref 150–450)
PMV BLD AUTO: 12 FL (ref 9.2–12.9)
POCT GLUCOSE: 121 MG/DL (ref 70–110)
POCT GLUCOSE: 233 MG/DL (ref 70–110)
POTASSIUM SERPL-SCNC: 4.4 MMOL/L (ref 3.5–5.1)
PROT SERPL-MCNC: 6.6 G/DL (ref 6–8.4)
RBC # BLD AUTO: 3.78 M/UL (ref 4–5.4)
SODIUM SERPL-SCNC: 135 MMOL/L (ref 136–145)
WBC # BLD AUTO: 6.92 K/UL (ref 3.9–12.7)

## 2022-05-10 PROCEDURE — 80100016 HC MAINTENANCE HEMODIALYSIS: Mod: NTX

## 2022-05-10 PROCEDURE — 84100 ASSAY OF PHOSPHORUS: CPT | Mod: NTX

## 2022-05-10 PROCEDURE — 90935 HEMODIALYSIS ONE EVALUATION: CPT | Mod: NTX,,, | Performed by: NURSE PRACTITIONER

## 2022-05-10 PROCEDURE — 25000003 PHARM REV CODE 250: Mod: NTX

## 2022-05-10 PROCEDURE — 90935 PR HEMODIALYSIS, ONE EVALUATION: ICD-10-PCS | Mod: NTX,,, | Performed by: NURSE PRACTITIONER

## 2022-05-10 PROCEDURE — 99239 HOSP IP/OBS DSCHRG MGMT >30: CPT | Mod: GC,NTX,, | Performed by: INTERNAL MEDICINE

## 2022-05-10 PROCEDURE — 85025 COMPLETE CBC W/AUTO DIFF WBC: CPT | Mod: NTX

## 2022-05-10 PROCEDURE — 80053 COMPREHEN METABOLIC PANEL: CPT | Mod: NTX | Performed by: STUDENT IN AN ORGANIZED HEALTH CARE EDUCATION/TRAINING PROGRAM

## 2022-05-10 PROCEDURE — 99239 PR HOSPITAL DISCHARGE DAY,>30 MIN: ICD-10-PCS | Mod: GC,NTX,, | Performed by: INTERNAL MEDICINE

## 2022-05-10 PROCEDURE — 83735 ASSAY OF MAGNESIUM: CPT | Mod: NTX

## 2022-05-10 PROCEDURE — 36415 COLL VENOUS BLD VENIPUNCTURE: CPT | Mod: NTX

## 2022-05-10 PROCEDURE — 63600175 PHARM REV CODE 636 W HCPCS: Mod: NTX

## 2022-05-10 RX ADMIN — HEPARIN SODIUM 5000 UNITS: 5000 INJECTION INTRAVENOUS; SUBCUTANEOUS at 06:05

## 2022-05-10 RX ADMIN — ASPIRIN 81 MG: 81 TABLET, COATED ORAL at 12:05

## 2022-05-10 RX ADMIN — SEVELAMER CARBONATE 800 MG: 800 TABLET, FILM COATED ORAL at 12:05

## 2022-05-10 RX ADMIN — NIFEDIPINE 60 MG: 60 TABLET, FILM COATED, EXTENDED RELEASE ORAL at 12:05

## 2022-05-10 RX ADMIN — INSULIN ASPART 2 UNITS: 100 INJECTION, SOLUTION INTRAVENOUS; SUBCUTANEOUS at 09:05

## 2022-05-10 RX ADMIN — VENLAFAXINE HYDROCHLORIDE 37.5 MG: 37.5 CAPSULE, EXTENDED RELEASE ORAL at 12:05

## 2022-05-10 RX ADMIN — INSULIN DETEMIR 10 UNITS: 100 INJECTION, SOLUTION SUBCUTANEOUS at 09:05

## 2022-05-10 RX ADMIN — INSULIN ASPART 3 UNITS: 100 INJECTION, SOLUTION INTRAVENOUS; SUBCUTANEOUS at 09:05

## 2022-05-10 RX ADMIN — CARVEDILOL 25 MG: 25 TABLET, FILM COATED ORAL at 07:05

## 2022-05-10 RX ADMIN — ATORVASTATIN CALCIUM 80 MG: 20 TABLET, FILM COATED ORAL at 12:05

## 2022-05-10 RX ADMIN — INSULIN ASPART 3 UNITS: 100 INJECTION, SOLUTION INTRAVENOUS; SUBCUTANEOUS at 12:05

## 2022-05-10 RX ADMIN — HYDRALAZINE HYDROCHLORIDE 50 MG: 50 TABLET ORAL at 06:05

## 2022-05-10 RX ADMIN — CALCITRIOL 0.5 MCG: 0.5 CAPSULE, LIQUID FILLED ORAL at 12:05

## 2022-05-10 NOTE — PROGRESS NOTES
HD treatment complete. Duration of treatment 3 hours and 3 L removed. Treatment was tolerated well and no complications with access to the left upper arm. Needles removed and hemostasis achieved. Dressing intact and no drainage noted. Thrill and bruit present.

## 2022-05-10 NOTE — NURSING
Transport arrived to bedside to assist patient off unit, discharge instructions reviewed with patient, voiced understanding.

## 2022-05-10 NOTE — DISCHARGE SUMMARY
[facility] Medicine  Discharge Summary      Patient Name: Isabela Read  MRN: [id number]  Patient Class: IP- Inpatient  Admission Date: 5/8/2022  Hospital Length of Stay: 2 days  Discharge Date and Time:  05/10/2022 8:11 AM  Attending Physician: Chantal Ferrara MD   Discharging Provider: Anish Barillas MD  Primary Care Provider: Tavo Bhatia MD  [facility] Medicine Team: [facility] MED 4 Anish Barillas MD    HPI:   Ms. Read is a 26 yo female with history of uncontrolled T1DM c/b ESRD on HD and retinopathy, hypertension who presents with sudden onset hemoptysis. Patient states she was sleeping this morning and was suddenly awakened by a cough with bright red blood. The volume of blood was not remarkable, but there was more than just streaks present. She also endorses nausea, vomiting, dizziness and generalized weakness. She continued to feel poorly and have these bloody coughing episodes, so her parents brought her to the ED. She describes a similar episode last January (2022) for which she states resolved with HD. Denies shortness of breath, chest pain, palpitations, abdominal pain, urinary or bowel complaints, headaches, extremity weakness, fever/chills.     Patient's receives HD TThSat. Her last session was the day prior to ED presentation (5/7/22), and states ~3 L fluid removed. Patient is ambulatory at baseline and independent in ADLs. She lives with her parents. Denies tobacco, alcohol, and illicit substance use.    ED course: Patient presented afebrile, tachycardic (), and in hypertensive urgency (/92). Triage SpO2 73%, initially placed on BiPAP initially with improved respiratory status, weaned to nasal cannula. Administered home BP meds and placed on nitroglycerin gtt with BP improvement to SBP 120s, in the 160s upon evaluation. Weaned off gtt. ECG revealed sinus tachycardia. Labs reveal no leukocytosis, HGB 12.1 (patient does have BL HGB ~8.0). Electrolytes WNL. Glucose  255. sCr 5.1 with ESRD. Troponin negative, BNP elevated at 2095. Lactate elevated at 2.69. CXR revealed bilateral pulmonary edema.     Patient to be admitted to hospital medicine for further evaluation and work-up.       * No surgery found *      Hospital Course:   Patient received HD 5/8 and BP controlled with home meds, no recurrent episodes of hemoptysis since. Insulin regimen adjusted for hyperglycemia. Liver US ordered to evaluate for infiltrative liver disease with homogeneous texture, no CBD dilation. Weaned to room air. Stable for discharge with PCP follow up.     Physical Exam   Constitutional:  Non-toxic appearance. She does not appear ill. No distress.   HENT:   Head: Normocephalic and atraumatic.   Nose: Nose normal. No rhinorrhea or nasal congestion.   Mouth/Throat: Mucous membranes are moist. No oropharyngeal exudate or posterior oropharyngeal erythema.   Eyes: Pupils are equal, round, and reactive to light. Right eye exhibits no discharge. Left eye exhibits no discharge.   Cardiovascular: Normal rate and normal pulses.   No murmur heard.  Pulmonary/Chest: Effort normal. No respiratory distress. She has no wheezes.   Abdominal: Normal appearance. She exhibits no distension and no mass. There is no abdominal tenderness.   Musculoskeletal:         General: No swelling. Normal range of motion.      Cervical back: Normal range of motion. No rigidity.      Right lower leg: No edema.      Left lower leg: No edema.   Lymphadenopathy:     She has no cervical adenopathy.   Neurological: She is alert. She displays no weakness. No cranial nerve deficit or sensory deficit.   Skin: Skin is warm. No bruising noted. No jaundice or pallor.       Goals of Care Treatment Preferences:  Code Status: Full Code      Consults:   Consults (From admission, onward)        Status Ordering Provider     Inpatient consult to Nephrology  Once        Provider:  (Not yet assigned)    Completed JULIETA TATE  continue home trazodone 50 mg PRN QHS      ESRD (end stage renal disease)  Receives HD outpatient T/Th/Sat via left arm AVF. Last HD day prior to presentation (5/7/22), ~3L removed.    - Nephrology consulted for urgent HD and inpatient HD needs   - last HD 5/9 with 3L removed  - continue calcitriol and sevelamer      Nephrotic syndrome  Albumin 3.1 on admission. Low albumin setting of ESRD and diabetes. Follows with Nephrology outpatient.    - Nephrology consulted -- recommended protein supplement with meals    Anxiety  - continue home venlafaxine 37.5 mg QD    Restrictive lung disease  Last evaluated by Pulm during 8/2020 admit--her restrictive lung disease at that time was suspected to be 2/2 chronic diastolic dysfunction from poorly controlled HTN. PFTs from 2018 with restrictive pattern, CT chest was unremarkable. Not on home oxygen.    Type 1 diabetes mellitus with end-stage renal disease (ESRD)  A1c 8.3. Home regimen: insulin detemir 10U am & 12U QHS, insulin lispro 10U TIDWM.     - detemir 10U AM + 12U PM  - aspart 3 u TIDWM  - LDSSI  - accuchecks ACHS  - inpatient goal -180  - diabetic diet      Final Active Diagnoses:    Diagnosis Date Noted POA    Insomnia [G47.00] 05/08/2022 Yes     Chronic    ESRD (end stage renal disease) [N18.6] 10/22/2021 Yes     Chronic    Nephrotic syndrome [N04.9] 03/04/2021 Yes     Chronic    Anxiety [F41.9] 11/07/2020 Yes     Chronic    Restrictive lung disease [J98.4] 08/04/2020 Yes     Chronic    Type 1 diabetes mellitus with end-stage renal disease (ESRD) [E10.22, N18.6] 02/24/2015 Yes     Chronic      Problems Resolved During this Admission:    Diagnosis Date Noted Date Resolved POA    PRINCIPAL PROBLEM:  Hypertensive emergency [I16.1] 03/04/2021 05/10/2022 Yes    Hemoptysis [R04.2] 05/08/2022 05/10/2022 Yes    Acute hypoxemic respiratory failure [J96.01] 09/17/2021 05/10/2022 Yes       Discharged Condition: stable    Disposition:     Follow Up:    Patient  Instructions:   No discharge procedures on file.    Significant Diagnostic Studies: Labs:   CMP   Recent Labs   Lab 05/09/22  0519 05/09/22  1208 05/10/22  0418   * 136 135*   K 4.9 4.8 4.4   CL 98 97 98   CO2 24 26 25   * 220* 175*   BUN 21* 25* 35*   CREATININE 4.0* 4.6* 5.4*   CALCIUM 8.6* 8.8 9.0   PROT 6.6 6.3 6.6   ALBUMIN 2.9* 3.1* 2.8*   BILITOT 1.6* 1.6* 1.5*   ALKPHOS 89 89 88   AST 18 17 12   ALT 12 12 9*   ANIONGAP 11 13 12   ESTGFRAFRICA 16.7* 14.1* 11.6*   EGFRNONAA 14.5* 12.2* 10.1*    and CBC   Recent Labs   Lab 05/08/22  0815 05/08/22  0817 05/09/22  0519 05/10/22  0418   WBC 12.19  --  9.45 6.92   HGB 12.3  --  10.4* 11.0*   HCT 39.3   < > 33.0* 34.8*     --  165 210    < > = values in this interval not displayed.       Pending Diagnostic Studies:     Procedure Component Value Units Date/Time    HIV 1/2 Ag/Ab (4th Gen) [[id number]] Collected: 05/08/22 0815    Order Status: Sent Lab Status: In process Updated: 05/08/22 0831    Specimen: Blood     Hepatitis C Antibody [[id number]] Collected: 05/08/22 0815    Order Status: Sent Lab Status: In process Updated: 05/08/22 0831    Specimen: Blood          Medications:  Reconciled Home Medications:      Medication List      CHANGE how you take these medications    hydrALAZINE 25 MG tablet  Commonly known as: APRESOLINE  Take 2 tablets (50 mg total) by mouth every 8 (eight) hours.  What changed: how much to take     insulin detemir U-100 100 unit/mL (3 mL) Inpn pen  Commonly known as: Levemir FLEXTOUCH  10 units subcutaneously in the morning and 12 units sq at night  What changed: additional instructions     valsartan 160 MG tablet  Commonly known as: DIOVAN  Take 1 tablet (160 mg total) by mouth once daily. Hold off on taking until told to restart by a physician.  What changed: additional instructions        CONTINUE taking these medications    aspirin 81 MG EC tablet  Commonly known as: ECOTRIN  Take 81 mg by mouth once daily.     blood  "sugar diagnostic Strp  To check BG 4 - 6 times daily, to use with insurance preferred meter     calcitRIOL 0.5 MCG Cap  Commonly known as: ROCALTROL  Take 0.5 mcg by mouth once daily.     carvediloL 25 MG tablet  Commonly known as: COREG  Take 25 mg by mouth 2 (two) times daily with meals.     fluticasone propionate 50 mcg/actuation nasal spray  Commonly known as: FLONASE  1 spray by Each Nostril route daily as needed for Rhinitis or Allergies.     FREESTYLE MARCY 14 DAY READER Misc  Generic drug: flash glucose scanning reader  1 each by Misc.(Non-Drug; Combo Route) route once daily.     FREESTYLE MARCY 14 DAY SENSOR Kit  Generic drug: flash glucose sensor  6 kits by Misc.(Non-Drug; Combo Route) route once daily.     HAIR,SKIN AND NAILS(FA-BIOTIN) ORAL  Take 1 tablet by mouth once daily.     insulin lispro 100 unit/mL pen  Commonly known as: HumaLOG KwikPen Insulin  Inject 10 units into skin the skin 3 three times daily with meals     lancets Misc  To check BG 4 - 6 times daily, to use with insurance preferred meter     NIFEdipine 60 MG (OSM) 24 hr tablet  Commonly known as: PROCARDIA-XL  Take 60 mg by mouth 2 (two) times a day.     pen needle, diabetic 32 gauge x 5/32" Ndle  For three times daily injection     sevelamer carbonate 800 mg Tab  Commonly known as: RENVELA  TAKE 1 TABLET BY MOUTH THREE TIMES DAILY WITH MEALS     traZODone 50 MG tablet  Commonly known as: DESYREL  Take 1 tablet (50 mg total) by mouth nightly as needed for Insomnia.     venlafaxine 37.5 MG 24 hr capsule  Commonly known as: EFFEXOR XR  Take 1 capsule (37.5 mg total) by mouth once daily.        STOP taking these medications    rosuvastatin 10 MG tablet  Commonly known as: CRESTOR            Indwelling Lines/Drains at time of discharge:   Lines/Drains/Airways     Central Venous Catheter Line  Duration                Hemodialysis Catheter right subclavian -- days          Drain  Duration                Hemodialysis AV Fistula Left upper arm " -- days                Time spent on the discharge of patient: 30 minutes         Anish Barillas MD  Department of Hospital Medicine  [facility]

## 2022-05-10 NOTE — PROGRESS NOTES
[facility] NOTE    Isabela Read is a 27 y.o. female currently on hemodialysis for removal of uremic toxins and volume.     Patient seen and evaluated on hemodialysis, tolerating treatment, see HD flowsheet for vitals and assessments.    No Hypotension, chest pain, shortness of breath, cramping, nausea or vomiting.      Labs have been reviewed and the dialysate bath has been adjusted.     Labs:     Recent Labs   Lab 05/09/22  0519 05/09/22  1208 05/10/22  0418   * 136 135*   K 4.9 4.8 4.4   CL 98 97 98   CO2 24 26 25   BUN 21* 25* 35*   CREATININE 4.0* 4.6* 5.4*   CALCIUM 8.6* 8.8 9.0   PHOS 4.4  --  5.4*     Recent Labs   Lab 05/08/22  0815 05/08/22  0817 05/09/22  0519 05/10/22  0418   WBC 12.19  --  9.45 6.92   HGB 12.3  --  10.4* 11.0*   HCT 39.3 44 33.0* 34.8*     --  165 210     Lab Results   Component Value Date    FESATURATED 17 (L) 10/11/2021    FERRITIN 220 10/11/2021        Assessment/Plan    Ultrafiltration goal: 1 L as tolerated, keep MAP >65.  - Seen on dialysis today, tolerating session with current UFR, no complications.   - Pending discharge today  - No lab stick/BP intake on access site  - Continue to monitor intake and output, daily weights   - Please avoid gadolinium, fleets, phos-based laxatives, NSAIDs  - Will continue dialysis treatments while in-patient    Anemia  - Hgb 11.0    BMM  - Renal diet with protein intake goal 1.5 g/kg/d  - Novasource with meals  - F/U PO4, Mg, Calcium. And albumin levels.   - Phos 5.4. Please continue Sevelamer       Joseline Reyna DNP, APRN, FNP-C  Nephrology Department  Pager:  [phone]

## 2022-05-10 NOTE — PROGRESS NOTES
HD treatment started. No complications with access to the left upper arm. Lines secured and telemetry in place. No complaints of discomfort at this time.

## 2022-05-10 NOTE — PLAN OF CARE
Patient vitals stable. No complaints. Wheels locked, bed low, call light with reach. Will continue to monitor    Problem: Adult Inpatient Plan of Care  Goal: Plan of Care Review  Outcome: Ongoing, Progressing  Goal: Patient-Specific Goal (Individualized)  Outcome: Ongoing, Progressing  Goal: Absence of Hospital-Acquired Illness or Injury  Outcome: Ongoing, Progressing  Goal: Optimal Comfort and Wellbeing  Outcome: Ongoing, Progressing  Goal: Readiness for Transition of Care  Outcome: Ongoing, Progressing     Problem: Diabetes Comorbidity  Goal: Blood Glucose Level Within Targeted Range  Outcome: Ongoing, Progressing     Problem: Fluid and Electrolyte Imbalance (Acute Kidney Injury/Impairment)  Goal: Fluid and Electrolyte Balance  Outcome: Ongoing, Progressing

## 2022-05-10 NOTE — PLAN OF CARE
[facility] - Med Surg  Discharge Final Note    Primary Care Provider: Tavo Bhatia MD    Expected Discharge Date: 5/10/2022    Final Discharge Note (most recent)     Final Note - 05/10/22 1338        Final Note    Assessment Type Final Discharge Note     Anticipated Discharge Disposition Home or Self Care     Hospital Resources/Appts/Education Provided Appointments scheduled and added to AVS        Post-Acute Status    Post-Acute Authorization Other     Other Status No Post-Acute Service Needs     Discharge Delays None known at this time                 Future Appointments   Date Time Provider Department Center   5/11/2022  2:00 PM Andre Hernandez MD [facility] GASTRO [facility]   5/16/2022  1:00 PM Hannah Hyde PA-C [facility] IM [facility] PCW   5/25/2022 12:30 PM LAB, [facility]POINT[facility]   5/25/2022  1:00 PM ECHO, [facility] ECHOSTR [facility]   5/25/2022  2:30 PM Florentin Esquivel MD [facility] HEARTTX [facility]   6/9/2022  1:00 PM Maximilian Montaño MD [facility] OPHTHAL [facility]   7/26/2022 11:00 AM Luciana Mayo MD [facility] ENDODIA [facility] LCSW  PRMISSY

## 2022-05-11 ENCOUNTER — PATIENT MESSAGE (OUTPATIENT)
Dept: ADMINISTRATIVE | Facility: CLINIC | Age: 27
End: 2022-05-11
Payer: MEDICARE

## 2022-05-11 ENCOUNTER — PATIENT OUTREACH (OUTPATIENT)
Dept: ADMINISTRATIVE | Facility: CLINIC | Age: 27
End: 2022-05-11
Payer: MEDICARE

## 2022-05-11 ENCOUNTER — OFFICE VISIT (OUTPATIENT)
Dept: GASTROENTEROLOGY | Facility: CLINIC | Age: 27
End: 2022-05-11
Payer: MEDICAID

## 2022-05-11 DIAGNOSIS — Z99.2 DEPENDENCE ON RENAL DIALYSIS: Primary | ICD-10-CM

## 2022-05-11 DIAGNOSIS — R19.7 DIARRHEA IN ADULT PATIENT: ICD-10-CM

## 2022-05-11 LAB — POCT GLUCOSE: 174 MG/DL (ref 70–110)

## 2022-05-11 PROCEDURE — 3066F PR DOCUMENTATION OF TREATMENT FOR NEPHROPATHY: ICD-10-PCS | Mod: CPTII,95,TXP, | Performed by: INTERNAL MEDICINE

## 2022-05-11 PROCEDURE — 1111F PR DISCHARGE MEDS RECONCILED W/ CURRENT OUTPATIENT MED LIST: ICD-10-PCS | Mod: CPTII,95,TXP, | Performed by: INTERNAL MEDICINE

## 2022-05-11 PROCEDURE — 4010F PR ACE/ARB THEARPY RXD/TAKEN: ICD-10-PCS | Mod: CPTII,95,TXP, | Performed by: INTERNAL MEDICINE

## 2022-05-11 PROCEDURE — 99213 OFFICE O/P EST LOW 20 MIN: CPT | Mod: 95,TXP,, | Performed by: INTERNAL MEDICINE

## 2022-05-11 PROCEDURE — 4010F ACE/ARB THERAPY RXD/TAKEN: CPT | Mod: CPTII,95,TXP, | Performed by: INTERNAL MEDICINE

## 2022-05-11 PROCEDURE — 3052F HG A1C>EQUAL 8.0%<EQUAL 9.0%: CPT | Mod: CPTII,95,TXP, | Performed by: INTERNAL MEDICINE

## 2022-05-11 PROCEDURE — 1111F DSCHRG MED/CURRENT MED MERGE: CPT | Mod: CPTII,95,TXP, | Performed by: INTERNAL MEDICINE

## 2022-05-11 PROCEDURE — 3052F PR MOST RECENT HEMOGLOBIN A1C LEVEL 8.0 - < 9.0%: ICD-10-PCS | Mod: CPTII,95,TXP, | Performed by: INTERNAL MEDICINE

## 2022-05-11 PROCEDURE — 99499 RISK ADDL DX/OHS AUDIT: ICD-10-PCS | Mod: 95,TXP,, | Performed by: INTERNAL MEDICINE

## 2022-05-11 PROCEDURE — 99213 PR OFFICE/OUTPT VISIT, EST, LEVL III, 20-29 MIN: ICD-10-PCS | Mod: 95,TXP,, | Performed by: INTERNAL MEDICINE

## 2022-05-11 PROCEDURE — 99499 UNLISTED E&M SERVICE: CPT | Mod: 95,TXP,, | Performed by: INTERNAL MEDICINE

## 2022-05-11 PROCEDURE — 3066F NEPHROPATHY DOC TX: CPT | Mod: CPTII,95,TXP, | Performed by: INTERNAL MEDICINE

## 2022-05-11 NOTE — PROGRESS NOTES
The patient location is:  In a parked car  The chief complaint leading to consultation is:  Follow-up for diarrhea    Visit type:  Telemedicine video visit    Face to Face time with patient:20 minutes of total time spent on the encounter, which includes face to face time and non-face to face time preparing to see the patient (eg, review of tests), Obtaining and/or reviewing separately obtained history, Documenting clinical information in the electronic or other health record, Independently interpreting results (not separately reported) and communicating results to the patient/family/caregiver, or Care coordination (not separately reported).     Each patient to whom he or she provides medical services by telemedicine is:  (1) informed of the relationship between the physician and patient and the respective role of any other health care provider with respect to management of the patient; and (2) notified that he or she may decline to receive medical services by telemedicine and may withdraw from such care at any time.    Notes:         [facility] Consultation Note    Reason for Consult:  The primary encounter diagnosis was Dependence on renal dialysis. A diagnosis of Diarrhea in adult patient was also pertinent to this visit.    PCP:   Tavo Bhatia       Referring MD:  No referring provider defined for this encounter.    Initial History of Present Illness (HPI):  This is a 27 y.o. female here for evaluation of follow-up pre renal transplant evaluation patient was originally seen telemedicine video visit in October 2021 for patient stating that she needs evaluation by GI before they can list her for renal transplant get history of nausea vomiting for a long time that seems to resolve greatly after starting dialysis in May of 2021 she is dialyze Tuesday Thursday Saturdays she does have chronic diarrhea which started around 2017 no sick contacts has has not been around anybody with chronic  diarrhea.  Patient underwent an EGD with small-bowel biopsies no evidence of celiac sprue colonoscopy was normal since then patient's diarrhea or since last clinic visit really has.  She has not had the problem with diarrhea like she was having for such a long time she is getting her dialysis 3 times a week she is being followed closely to get on the list for kidney pancreas transplant overall she feels good no nausea no vomiting since really being on dialysis no blood in her stools no melena no bright red blood per rectum no hematemesis no hemoptysis no epistaxis no hematuria she does still have menstrual periods but overall she feels really good.     Abdominal pain - no  Reflux - no  Dysphagia - no   Bowel habits -  as above no further diarrhea back to normal bowel habits no longer diarrhea  GI bleeding - none  NSAID usage - aspirin        ROS:  Constitutional: No fevers, chills, No weight loss  ENT:  No heartburn no dysphagia no odynophagia no hoarseness  CV: No chest pain, no palpitation  Pulm: No cough, No shortness of breath, no wheezing, no flushing  Ophtho:  As above  GI: see HPI  Derm: No rash, no itching  Heme: No lymphadenopathy, No easy bruising  MSK: No significant arthritis  : No dysuria, No hematuria  Endo: No hot or cold intolerance  Neuro: No syncope, No seizure, no strokes  Psych: No uncontrolled anxiety, No uncontrolled depression       Interval HPI 05/11/2022:  The patient's last visit with me was on 3/10/2022.    Medical History:  has a past medical history of Anemia, Chronic hypertension with exacerbation during pregnancy in second trimester (11/6/2020), Chronic kidney disease, Depression, Diabetes mellitus, Diabetic retinopathy, Diarrhea, Encounter for blood transfusion, Gastroparesis, Glaucoma, Hepatomegaly (4/29/2021), psychiatric care, Hyperlipidemia, Hypertension, Nephrotic syndrome, Nephrotic syndrome (3/4/2021), Palpitations, Poor fetal growth affecting management of mother in second  trimester (10/15/2020), Restrictive lung disease, Retinal detachment, and Severe pre-eclampsia in second trimester (11/6/2020).    Surgical History:  has a past surgical history that includes AV fistula placement (Left, 4/7/2021); Percutaneous transluminal angioplasty of arteriovenous fistula (N/A, 8/11/2021); Fistulogram (N/A, 8/11/2021); Revision of arteriovenous fistula (Left, 12/10/2021); Repair of retinal detachment with vitrectomy (Right, 1/25/2022); removal implant, posterior segment, intraocular (Right, 2/1/2022); Vitrectomy by pars plana approach (Right, 2/1/2022); Esophagogastroduodenoscopy (N/A, 3/16/2022); and Colonoscopy (N/A, 3/16/2022).    Family History: family history includes Diabetes in her brother; Heart disease in her father; Hypertension in her mother..     Social History:  reports that she has never smoked. She has never used smokeless tobacco. She reports that she does not drink alcohol and does not use drugs.    Review of patient's allergies indicates:  No Known Allergies    Medication List with Changes/Refills   Current Medications    ASPIRIN (ECOTRIN) 81 MG EC TABLET    Take 81 mg by mouth once daily.    BLOOD SUGAR DIAGNOSTIC STRP    To check BG 4 - 6 times daily, to use with insurance preferred meter    CALCITRIOL (ROCALTROL) 0.5 MCG CAP    Take 0.5 mcg by mouth once daily.    CARVEDILOL (COREG) 25 MG TABLET    Take 25 mg by mouth 2 (two) times daily with meals.    FLASH GLUCOSE SCANNING READER (FREESTYLE MARCY 14 DAY READER) MISC    1 each by Misc.(Non-Drug; Combo Route) route once daily.    FLASH GLUCOSE SENSOR (FREESTYLE MARCY 14 DAY SENSOR) KIT    6 kits by Misc.(Non-Drug; Combo Route) route once daily.    FLUTICASONE PROPIONATE (FLONASE) 50 MCG/ACTUATION NASAL SPRAY    1 spray by Each Nostril route daily as needed for Rhinitis or Allergies.    HYDRALAZINE (APRESOLINE) 25 MG TABLET    Take 2 tablets (50 mg total) by mouth every 8 (eight) hours.    INSULIN DETEMIR U-100 (LEVEMIR  "FLEXTOUCH) 100 UNIT/ML (3 ML) SUBQ INPN PEN    10 units subcutaneously in the morning and 12 units sq at night    INSULIN LISPRO (HUMALOG KWIKPEN INSULIN) 100 UNIT/ML PEN    Inject 10 units into skin the skin 3 three times daily with meals    LANCETS MISC    To check BG 4 - 6 times daily, to use with insurance preferred meter    MULTIVIT-MIN/FOLIC ACID/BIOTIN (HAIR,SKIN AND NAILS,FA-BIOTIN, ORAL)    Take 1 tablet by mouth once daily.    NIFEDIPINE (PROCARDIA-XL) 60 MG (OSM) 24 HR TABLET    Take 60 mg by mouth 2 (two) times a day.    PEN NEEDLE, DIABETIC 32 GAUGE X 5/32" NDLE    For three times daily injection    SEVELAMER CARBONATE (RENVELA) 800 MG TAB    TAKE 1 TABLET BY MOUTH THREE TIMES DAILY WITH MEALS    TRAZODONE (DESYREL) 50 MG TABLET    Take 1 tablet (50 mg total) by mouth nightly as needed for Insomnia.    VALSARTAN (DIOVAN) 160 MG TABLET    Take 1 tablet (160 mg total) by mouth once daily. Hold off on taking until told to restart by a physician.    VENLAFAXINE (EFFEXOR XR) 37.5 MG 24 HR CAPSULE    Take 1 capsule (37.5 mg total) by mouth once daily.         Objective Findings:    Vital Signs:  There were no vitals taken for this visit.  There is no height or weight on file to calculate BMI.    Physical Exam:  Telemedicine video visit  General Appearance: Well appearing in no acute distress  Psychiatric:  Normal speech mentation and affect    Labs:  Lab Results   Component Value Date    WBC 6.92 05/10/2022    HGB 11.0 (L) 05/10/2022    HCT 34.8 (L) 05/10/2022     05/10/2022    CHOL 135 10/11/2021    TRIG 36 10/11/2021    HDL 71 10/11/2021    ALT 9 (L) 05/10/2022    AST 12 05/10/2022     (L) 05/10/2022    K 4.4 05/10/2022    CL 98 05/10/2022    CREATININE 5.4 (H) 05/10/2022    BUN 35 (H) 05/10/2022    CO2 25 05/10/2022    TSH 3.786 08/04/2020    INR 1.2 05/08/2022    GLUF 185 (H) 08/05/2020    HGBA1C 8.3 (H) 05/08/2022             Medical Decision Making:  EGD colonoscopy images and path " personally reviewed by myself  Labs reviewed  The Olympus scope GIF-                          ([id number]) was introduced through the mouth, and                          advanced to the second part of duodenum. The upper                          GI endoscopy was accomplished without difficulty.                          The patient tolerated the procedure well.   Findings:        The examined esophagus was normal.        Clear fluid was found in the entire examined stomach.        The examined duodenum was normal. Biopsies for histology were taken        with a cold forceps for evaluation of celiac disease.   Impression:            - Normal esophagus.                          - Clear gastric fluid.                          - Normal examined duodenum. Biopsied.   Recommendation:        - Patient has a contact number available for                          emergencies. The signs and symptoms of potential                          delayed complications were discussed with the                          patient. Return to normal activities tomorrow.                          Written discharge instructions were provided to                          the patient.                          - Resume previous diet.                          - Continue present medications.                          - Await pathology results.                          - Discharge patient to home.   Attending Participation:        I personally performed the entire procedure.   Tavo Kwok MD   3/16/2022   The Olympus scope PCF-H190DL ([id number]) was                          introduced through the anus and advanced to the                          terminal ileum. The colonoscopy was performed                          without difficulty. The patient tolerated the                          procedure well. The quality of the bowel                          preparation was good. The terminal ileum,                          ileocecal valve, appendiceal  "orifice, and rectum                          were photographed.   Findings:        The perianal and digital rectal examinations were normal.        The terminal ileum appeared normal.        The exam was otherwise without abnormality on direct and        retroflexion views.   Impression:            - The examined portion of the ileum was normal.                          - The examination was otherwise normal on direct                          and retroflexion views.                          - No specimens collected.   Recommendation:        - Patient has a contact number available for                          emergencies. The signs and symptoms of potential                          delayed complications were discussed with the                          patient. Return to normal activities tomorrow.                          Written discharge instructions were provided to                          the patient.                          - Resume previous diet.                          - Continue present medications.                          - Repeat colonoscopy at age 50 for surveillance.                          - Discharge patient to home.   Attending Participation:        I personally performed the entire procedure.   Tavo Kwok MD   3/16/2022     [facility] DIAGNOSIS:   DUODENUM, BIOPSY:   - Duodenal mucosa without diagnostic abnormality.   - No villous atrophy or increased intraepithelial lymphocytes identified.   LIZ MILIAN M.D.   Report attached.   Performing site:   [place] [address]   "Disclaimer:  This case diagnosis was rendered completely by the outside   consultation pathologist and the case is electronically signed by an [facility]   pathologist listed below solely to release the report into the medical   record."     Comment: Interp By Yojana Mcintosh MD, Signed on 03/23/2022       Assessment:  1. Dependence on renal dialysis    2. Diarrhea in adult patient  "        Recommendations:  1. EGD colonoscopy unrevealing patient's diarrhea has resolved.  2. Patient waiting renal pancreas transplant listing  3. Return to GI clinic for any concerns or problems      No follow-ups on file.      Order summary:         Thank you so much for allowing me to participate in the care of Isabela Hernandez MD

## 2022-05-11 NOTE — PROGRESS NOTES
C3 nurse attempted to contact Isabela Read for a TCC post hospital discharge follow up call. No answer. Left voicemail with callback information. Message sent via [facility] portal for Post Discharge Attempt with callback information. The patient has a scheduled HOSFU appointment with BOOKER Hyde on 5/16/2022 @ 1300.

## 2022-05-17 ENCOUNTER — HOSPITAL ENCOUNTER (OUTPATIENT)
Facility: HOSPITAL | Age: 27
Discharge: HOME | End: 2022-05-19
Attending: EMERGENCY MEDICINE | Admitting: EMERGENCY MEDICINE
Payer: MEDICARE

## 2022-05-17 DIAGNOSIS — N18.6 ESRD (END STAGE RENAL DISEASE): ICD-10-CM

## 2022-05-17 DIAGNOSIS — E87.5 HYPERKALEMIA: ICD-10-CM

## 2022-05-17 DIAGNOSIS — R07.9 CHEST PAIN: ICD-10-CM

## 2022-05-17 DIAGNOSIS — F41.9 ANXIETY: Chronic | ICD-10-CM

## 2022-05-17 DIAGNOSIS — N18.6 TYPE 1 DIABETES MELLITUS WITH END-STAGE RENAL DISEASE (ESRD): Chronic | ICD-10-CM

## 2022-05-17 DIAGNOSIS — E10.22 TYPE 1 DIABETES MELLITUS WITH END-STAGE RENAL DISEASE (ESRD): Chronic | ICD-10-CM

## 2022-05-17 DIAGNOSIS — D64.9 ANEMIA, UNSPECIFIED TYPE: Primary | ICD-10-CM

## 2022-05-17 DIAGNOSIS — I10 HYPERTENSION, ESSENTIAL: ICD-10-CM

## 2022-05-17 LAB
ANION GAP SERPL CALC-SCNC: 15 MMOL/L (ref 8–16)
ANION GAP SERPL CALC-SCNC: 15 MMOL/L (ref 8–16)
BASOPHILS # BLD AUTO: 0.05 K/UL (ref 0–0.2)
BASOPHILS NFR BLD: 0.8 % (ref 0–1.9)
BUN SERPL-MCNC: 60 MG/DL (ref 6–20)
BUN SERPL-MCNC: 61 MG/DL (ref 6–30)
BUN SERPL-MCNC: 62 MG/DL (ref 6–20)
BUN SERPL-MCNC: 83 MG/DL (ref 6–30)
CALCIUM SERPL-MCNC: 7.9 MG/DL (ref 8.7–10.5)
CALCIUM SERPL-MCNC: 8 MG/DL (ref 8.7–10.5)
CHLORIDE SERPL-SCNC: 93 MMOL/L (ref 95–110)
CHLORIDE SERPL-SCNC: 95 MMOL/L (ref 95–110)
CHLORIDE SERPL-SCNC: 96 MMOL/L (ref 95–110)
CHLORIDE SERPL-SCNC: 97 MMOL/L (ref 95–110)
CO2 SERPL-SCNC: 22 MMOL/L (ref 23–29)
CO2 SERPL-SCNC: 26 MMOL/L (ref 23–29)
CREAT SERPL-MCNC: 7.2 MG/DL (ref 0.5–1.4)
CREAT SERPL-MCNC: 7.8 MG/DL (ref 0.5–1.4)
CREAT SERPL-MCNC: 8.4 MG/DL (ref 0.5–1.4)
CREAT SERPL-MCNC: 8.5 MG/DL (ref 0.5–1.4)
DIFFERENTIAL METHOD: ABNORMAL
EOSINOPHIL # BLD AUTO: 0.2 K/UL (ref 0–0.5)
EOSINOPHIL NFR BLD: 2.9 % (ref 0–8)
ERYTHROCYTE [DISTWIDTH] IN BLOOD BY AUTOMATED COUNT: 16.6 % (ref 11.5–14.5)
EST. GFR  (AFRICAN AMERICAN): 7.4 ML/MIN/1.73 M^2
EST. GFR  (AFRICAN AMERICAN): 8.2 ML/MIN/1.73 M^2
EST. GFR  (NON AFRICAN AMERICAN): 6.5 ML/MIN/1.73 M^2
EST. GFR  (NON AFRICAN AMERICAN): 7.1 ML/MIN/1.73 M^2
GLUCOSE SERPL-MCNC: 203 MG/DL (ref 70–110)
GLUCOSE SERPL-MCNC: 223 MG/DL (ref 70–110)
GLUCOSE SERPL-MCNC: 505 MG/DL (ref 70–110)
GLUCOSE SERPL-MCNC: 547 MG/DL (ref 70–110)
HCT VFR BLD AUTO: 30.4 % (ref 37–48.5)
HCT VFR BLD CALC: 32 %PCV (ref 36–54)
HCT VFR BLD CALC: 33 %PCV (ref 36–54)
HGB BLD-MCNC: 9.9 G/DL (ref 12–16)
IMM GRANULOCYTES # BLD AUTO: 0.04 K/UL (ref 0–0.04)
IMM GRANULOCYTES NFR BLD AUTO: 0.7 % (ref 0–0.5)
LYMPHOCYTES # BLD AUTO: 1.5 K/UL (ref 1–4.8)
LYMPHOCYTES NFR BLD: 25.7 % (ref 18–48)
MCH RBC QN AUTO: 29.1 PG (ref 27–31)
MCHC RBC AUTO-ENTMCNC: 32.6 G/DL (ref 32–36)
MCV RBC AUTO: 89 FL (ref 82–98)
MONOCYTES # BLD AUTO: 0.5 K/UL (ref 0.3–1)
MONOCYTES NFR BLD: 7.9 % (ref 4–15)
NEUTROPHILS # BLD AUTO: 3.7 K/UL (ref 1.8–7.7)
NEUTROPHILS NFR BLD: 62 % (ref 38–73)
NRBC BLD-RTO: 0 /100 WBC
PLATELET # BLD AUTO: 331 K/UL (ref 150–450)
PMV BLD AUTO: 11 FL (ref 9.2–12.9)
POC IONIZED CALCIUM: 0.97 MMOL/L (ref 1.06–1.42)
POC IONIZED CALCIUM: 0.99 MMOL/L (ref 1.06–1.42)
POC TCO2 (MEASURED): 29 MMOL/L (ref 23–29)
POC TCO2 (MEASURED): 29 MMOL/L (ref 23–29)
POCT GLUCOSE: 315 MG/DL (ref 70–110)
POCT GLUCOSE: 486 MG/DL (ref 70–110)
POTASSIUM BLD-SCNC: 5.8 MMOL/L (ref 3.5–5.1)
POTASSIUM BLD-SCNC: 7.7 MMOL/L (ref 3.5–5.1)
POTASSIUM SERPL-SCNC: 5.9 MMOL/L (ref 3.5–5.1)
POTASSIUM SERPL-SCNC: 7.7 MMOL/L (ref 3.5–5.1)
RBC # BLD AUTO: 3.4 M/UL (ref 4–5.4)
SAMPLE: ABNORMAL
SAMPLE: ABNORMAL
SODIUM BLD-SCNC: 128 MMOL/L (ref 136–145)
SODIUM BLD-SCNC: 134 MMOL/L (ref 136–145)
SODIUM SERPL-SCNC: 130 MMOL/L (ref 136–145)
SODIUM SERPL-SCNC: 137 MMOL/L (ref 136–145)
WBC # BLD AUTO: 5.92 K/UL (ref 3.9–12.7)

## 2022-05-17 PROCEDURE — 99284 PR EMERGENCY DEPT VISIT,LEVEL IV: ICD-10-PCS | Mod: NTX,,, | Performed by: NURSE PRACTITIONER

## 2022-05-17 PROCEDURE — 94640 AIRWAY INHALATION TREATMENT: CPT | Mod: NTX

## 2022-05-17 PROCEDURE — 25000003 PHARM REV CODE 250: Mod: NTX | Performed by: PHYSICIAN ASSISTANT

## 2022-05-17 PROCEDURE — 25000242 PHARM REV CODE 250 ALT 637 W/ HCPCS: Mod: NTX | Performed by: STUDENT IN AN ORGANIZED HEALTH CARE EDUCATION/TRAINING PROGRAM

## 2022-05-17 PROCEDURE — 99220 PR INITIAL OBSERVATION CARE,LEVL III: CPT | Mod: NTX,CS,, | Performed by: PHYSICIAN ASSISTANT

## 2022-05-17 PROCEDURE — 96375 TX/PRO/DX INJ NEW DRUG ADDON: CPT | Mod: NTX

## 2022-05-17 PROCEDURE — 93005 ELECTROCARDIOGRAM TRACING: CPT | Mod: NTX

## 2022-05-17 PROCEDURE — 80048 BASIC METABOLIC PNL TOTAL CA: CPT | Mod: NTX | Performed by: EMERGENCY MEDICINE

## 2022-05-17 PROCEDURE — 99285 EMERGENCY DEPT VISIT HI MDM: CPT | Mod: 25,NTX

## 2022-05-17 PROCEDURE — G0378 HOSPITAL OBSERVATION PER HR: HCPCS | Mod: NTX

## 2022-05-17 PROCEDURE — G0257 UNSCHED DIALYSIS ESRD PT HOS: HCPCS | Mod: NTX

## 2022-05-17 PROCEDURE — 96372 THER/PROPH/DIAG INJ SC/IM: CPT | Mod: NTX | Performed by: PHYSICIAN ASSISTANT

## 2022-05-17 PROCEDURE — 25000003 PHARM REV CODE 250: Mod: NTX | Performed by: EMERGENCY MEDICINE

## 2022-05-17 PROCEDURE — C9399 UNCLASSIFIED DRUGS OR BIOLOG: HCPCS | Mod: NTX | Performed by: PHYSICIAN ASSISTANT

## 2022-05-17 PROCEDURE — 96365 THER/PROPH/DIAG IV INF INIT: CPT | Mod: NTX,59

## 2022-05-17 PROCEDURE — 96376 TX/PRO/DX INJ SAME DRUG ADON: CPT

## 2022-05-17 PROCEDURE — 80047 BASIC METABLC PNL IONIZED CA: CPT | Mod: NTX,59

## 2022-05-17 PROCEDURE — 82962 GLUCOSE BLOOD TEST: CPT | Mod: NTX

## 2022-05-17 PROCEDURE — 99220 PR INITIAL OBSERVATION CARE,LEVL III: ICD-10-PCS | Mod: NTX,CS,, | Performed by: PHYSICIAN ASSISTANT

## 2022-05-17 PROCEDURE — 99284 PR EMERGENCY DEPT VISIT,LEVEL IV: ICD-10-PCS | Mod: NTX,CS,, | Performed by: EMERGENCY MEDICINE

## 2022-05-17 PROCEDURE — 99284 EMERGENCY DEPT VISIT MOD MDM: CPT | Mod: NTX,CS,, | Performed by: EMERGENCY MEDICINE

## 2022-05-17 PROCEDURE — 25000242 PHARM REV CODE 250 ALT 637 W/ HCPCS: Mod: NTX | Performed by: EMERGENCY MEDICINE

## 2022-05-17 PROCEDURE — 80100016 HC MAINTENANCE HEMODIALYSIS: Mod: NTX

## 2022-05-17 PROCEDURE — 63600175 PHARM REV CODE 636 W HCPCS: Mod: NTX | Performed by: EMERGENCY MEDICINE

## 2022-05-17 PROCEDURE — 93010 ELECTROCARDIOGRAM REPORT: CPT | Mod: NTX,,, | Performed by: INTERNAL MEDICINE

## 2022-05-17 PROCEDURE — 96368 THER/DIAG CONCURRENT INF: CPT | Mod: NTX

## 2022-05-17 PROCEDURE — 63600175 PHARM REV CODE 636 W HCPCS: Mod: NTX | Performed by: PHYSICIAN ASSISTANT

## 2022-05-17 PROCEDURE — 85025 COMPLETE CBC W/AUTO DIFF WBC: CPT | Mod: NTX | Performed by: STUDENT IN AN ORGANIZED HEALTH CARE EDUCATION/TRAINING PROGRAM

## 2022-05-17 PROCEDURE — 96361 HYDRATE IV INFUSION ADD-ON: CPT | Mod: NTX

## 2022-05-17 PROCEDURE — 93010 EKG 12-LEAD: ICD-10-PCS | Mod: NTX,,, | Performed by: INTERNAL MEDICINE

## 2022-05-17 PROCEDURE — 99284 EMERGENCY DEPT VISIT MOD MDM: CPT | Mod: NTX,,, | Performed by: NURSE PRACTITIONER

## 2022-05-17 RX ORDER — SODIUM CHLORIDE 0.9 % (FLUSH) 0.9 %
10 SYRINGE (ML) INJECTION
Status: DISCONTINUED | OUTPATIENT
Start: 2022-05-17 | End: 2022-05-19 | Stop reason: HOSPADM

## 2022-05-17 RX ORDER — TALC
6 POWDER (GRAM) TOPICAL NIGHTLY PRN
Status: DISCONTINUED | OUTPATIENT
Start: 2022-05-17 | End: 2022-05-19 | Stop reason: HOSPADM

## 2022-05-17 RX ORDER — BISACODYL 10 MG
10 SUPPOSITORY, RECTAL RECTAL DAILY PRN
Status: DISCONTINUED | OUTPATIENT
Start: 2022-05-17 | End: 2022-05-19 | Stop reason: HOSPADM

## 2022-05-17 RX ORDER — PROCHLORPERAZINE EDISYLATE 5 MG/ML
5 INJECTION INTRAMUSCULAR; INTRAVENOUS EVERY 6 HOURS PRN
Status: DISCONTINUED | OUTPATIENT
Start: 2022-05-17 | End: 2022-05-19 | Stop reason: HOSPADM

## 2022-05-17 RX ORDER — SODIUM CHLORIDE 0.9 % (FLUSH) 0.9 %
5 SYRINGE (ML) INJECTION
Status: DISCONTINUED | OUTPATIENT
Start: 2022-05-17 | End: 2022-05-19 | Stop reason: HOSPADM

## 2022-05-17 RX ORDER — SODIUM CHLORIDE 9 MG/ML
INJECTION, SOLUTION INTRAVENOUS ONCE
Status: COMPLETED | OUTPATIENT
Start: 2022-05-17 | End: 2022-05-17

## 2022-05-17 RX ORDER — ALBUTEROL SULFATE 2.5 MG/.5ML
10 SOLUTION RESPIRATORY (INHALATION)
Status: COMPLETED | OUTPATIENT
Start: 2022-05-17 | End: 2022-05-17

## 2022-05-17 RX ORDER — ACETAMINOPHEN 325 MG/1
650 TABLET ORAL EVERY 4 HOURS PRN
Status: DISCONTINUED | OUTPATIENT
Start: 2022-05-17 | End: 2022-05-19 | Stop reason: HOSPADM

## 2022-05-17 RX ORDER — INSULIN ASPART 100 [IU]/ML
1-10 INJECTION, SOLUTION INTRAVENOUS; SUBCUTANEOUS
Status: DISCONTINUED | OUTPATIENT
Start: 2022-05-17 | End: 2022-05-18

## 2022-05-17 RX ORDER — MAG HYDROX/ALUMINUM HYD/SIMETH 200-200-20
30 SUSPENSION, ORAL (FINAL DOSE FORM) ORAL 4 TIMES DAILY PRN
Status: DISCONTINUED | OUTPATIENT
Start: 2022-05-17 | End: 2022-05-19 | Stop reason: HOSPADM

## 2022-05-17 RX ORDER — IPRATROPIUM BROMIDE AND ALBUTEROL SULFATE 2.5; .5 MG/3ML; MG/3ML
3 SOLUTION RESPIRATORY (INHALATION) EVERY 4 HOURS PRN
Status: DISCONTINUED | OUTPATIENT
Start: 2022-05-17 | End: 2022-05-19 | Stop reason: HOSPADM

## 2022-05-17 RX ORDER — CALCIUM GLUCONATE 20 MG/ML
1 INJECTION, SOLUTION INTRAVENOUS
Status: COMPLETED | OUTPATIENT
Start: 2022-05-17 | End: 2022-05-17

## 2022-05-17 RX ORDER — ACETAMINOPHEN 500 MG
1000 TABLET ORAL EVERY 8 HOURS PRN
Status: DISCONTINUED | OUTPATIENT
Start: 2022-05-17 | End: 2022-05-19 | Stop reason: HOSPADM

## 2022-05-17 RX ORDER — IBUPROFEN 200 MG
16 TABLET ORAL
Status: DISCONTINUED | OUTPATIENT
Start: 2022-05-17 | End: 2022-05-18

## 2022-05-17 RX ORDER — INSULIN ASPART 100 [IU]/ML
0-5 INJECTION, SOLUTION INTRAVENOUS; SUBCUTANEOUS
Status: DISCONTINUED | OUTPATIENT
Start: 2022-05-17 | End: 2022-05-18

## 2022-05-17 RX ORDER — NALOXONE HCL 0.4 MG/ML
0.02 VIAL (ML) INJECTION
Status: DISCONTINUED | OUTPATIENT
Start: 2022-05-17 | End: 2022-05-19 | Stop reason: HOSPADM

## 2022-05-17 RX ORDER — SIMETHICONE 80 MG
1 TABLET,CHEWABLE ORAL 4 TIMES DAILY PRN
Status: DISCONTINUED | OUTPATIENT
Start: 2022-05-17 | End: 2022-05-19 | Stop reason: HOSPADM

## 2022-05-17 RX ORDER — IBUPROFEN 200 MG
24 TABLET ORAL
Status: DISCONTINUED | OUTPATIENT
Start: 2022-05-17 | End: 2022-05-18

## 2022-05-17 RX ORDER — ONDANSETRON 4 MG/1
8 TABLET, ORALLY DISINTEGRATING ORAL EVERY 8 HOURS PRN
Status: DISCONTINUED | OUTPATIENT
Start: 2022-05-17 | End: 2022-05-19 | Stop reason: HOSPADM

## 2022-05-17 RX ORDER — GLUCAGON 1 MG
1 KIT INJECTION
Status: DISCONTINUED | OUTPATIENT
Start: 2022-05-17 | End: 2022-05-18

## 2022-05-17 RX ORDER — SODIUM CHLORIDE 0.9 % (FLUSH) 0.9 %
3 SYRINGE (ML) INJECTION
Status: DISCONTINUED | OUTPATIENT
Start: 2022-05-17 | End: 2022-05-19 | Stop reason: HOSPADM

## 2022-05-17 RX ORDER — HYDRALAZINE HYDROCHLORIDE 25 MG/1
25 TABLET, FILM COATED ORAL EVERY 8 HOURS
Status: DISCONTINUED | OUTPATIENT
Start: 2022-05-18 | End: 2022-05-19 | Stop reason: HOSPADM

## 2022-05-17 RX ORDER — INSULIN ASPART 100 [IU]/ML
5 INJECTION, SOLUTION INTRAVENOUS; SUBCUTANEOUS
Status: DISCONTINUED | OUTPATIENT
Start: 2022-05-17 | End: 2022-05-18

## 2022-05-17 RX ADMIN — CALCIUM GLUCONATE 1 G: 20 INJECTION, SOLUTION INTRAVENOUS at 12:05

## 2022-05-17 RX ADMIN — INSULIN DETEMIR 12 UNITS: 100 INJECTION, SOLUTION SUBCUTANEOUS at 11:05

## 2022-05-17 RX ADMIN — DEXTROSE 250 ML: 10 SOLUTION INTRAVENOUS at 12:05

## 2022-05-17 RX ADMIN — ALBUTEROL SULFATE 10 MG: 2.5 SOLUTION RESPIRATORY (INHALATION) at 12:05

## 2022-05-17 RX ADMIN — SODIUM CHLORIDE: 0.9 INJECTION, SOLUTION INTRAVENOUS at 02:05

## 2022-05-17 RX ADMIN — INSULIN HUMAN 5 UNITS: 100 INJECTION, SOLUTION PARENTERAL at 12:05

## 2022-05-17 RX ADMIN — ALBUTEROL SULFATE 10 MG: 2.5 SOLUTION RESPIRATORY (INHALATION) at 01:05

## 2022-05-17 RX ADMIN — INSULIN ASPART 4 UNITS: 100 INJECTION, SOLUTION INTRAVENOUS; SUBCUTANEOUS at 11:05

## 2022-05-17 NOTE — ED TRIAGE NOTES
Isabela Read, a 27 y.o. female presents to the ED w/ complaint of bleed from dialysis graft. No bleeding noted upon arrival. Pt reports she went to the bathroom while at dialysis and catheter that was in dialysis graft fell out and patient began bleeding from site. Pt denies pain. Denies weakness/LOC. Denies n/v/d. Denies chest pain/SOB.     Triage note:  Chief Complaint   Patient presents with    Bleeding     Bleeding from Dialysis graft  controlled on arrival     Review of patient's allergies indicates:  No Known Allergies  Past Medical History:   Diagnosis Date    Anemia     Chronic hypertension with exacerbation during pregnancy in second trimester 11/6/2020    Current regimen (11/6/20):  - Carvedilol 12.5 mg BID - Nifedipine 30 mg daily Baseline CKD + proteinuria    Chronic kidney disease     Depression     Diabetes mellitus     Diabetic retinopathy     Diarrhea     Encounter for blood transfusion     Gastroparesis     Glaucoma     Hepatomegaly 4/29/2021    Hx of psychiatric care     Hyperlipidemia     Hypertension     Nephrotic syndrome     Nephrotic syndrome 3/4/2021    Palpitations     Poor fetal growth affecting management of mother in second trimester 10/15/2020    Restrictive lung disease     Retinal detachment     Severe pre-eclampsia in second trimester 11/6/2020     Patient identifiers verified and correct for Isabela Read    LOC: The patient is awake, alert, and aware of environment. The patient is AOX4 and speaking appropriately.   APPEARANCE: No acute distress noted.   HEENT: WDL, PERRLA  PSYCHOSOCIAL: Patient is calm and cooperative. Denies SI/HI.  SKIN: The skin is warm, dry, color consistent with ethnicity. Dialysis access to left upper arm. No bleeding noted to site.    RESPIRATORY: Airway is open and patent. Bilateral chest rise and fall. Respiratory rate even and unlabored.  No accessory muscle use noted.  CARDIAC: Patient has a normal rate and rhythm.  No complaints of chest pain.  ABDOMEN/GI: Soft, non tender. No distention noted. Denies n/v/d.   URINARY: No complaints of frequency, urgency, burning, or blood in urine.   NEUROLOGIC: Eyes open spontaneously. Speech clear.  Able to follow commands, demonstrating ability to actively and appropriately communicate within context of current conversation. Symmetrical facial muscles. Movement is purposeful. Denies dizziness/lightheadedness.  MUSCULOSKELETAL: No obvious deformities noted. Full ROM in all extremities.  PERIPHERAL VASCULAR: Cap refill <3 secs bilaterally. No peripheral edema noted. Denies numbness and tingling in extremities.

## 2022-05-17 NOTE — PLAN OF CARE
05/17/22 1446   Post-Acute Status   Post-Acute Authorization Other   Diaylsis Status Set-up Complete/Auth obtained   Other Status No Post-Acute Service Needs   Discharge Delays None known at this time   Discharge Plan   Discharge Plan A Home

## 2022-05-17 NOTE — ED PROVIDER NOTES
"Encounter Date: 5/17/2022       History     Chief Complaint   Patient presents with    Bleeding     Bleeding from Dialysis graft  controlled on arrival     The history is provided by the patient and medical records. No  was used.        Isabela Read is a 27 year old female with history of ESRD (TTHS), HTN, DM, who presents to the ED for evaluation for fistula bleeding. Patient states that during dialysis today after the needle was placed the machine was not acting right. States that the dialysis center disconnected her from the machine with the needle still her her arm. States she went to use the restroom and the needle came out accidentally and she had some bleeding from the site. States it was "a lot of blood", with bleeding controlled with direct pressure and no bleeding at this time. Patient states she was told to come to the hospital for evaluation of her CBC to ensure she didn't need a transfusion. Patient states at this time she feel well with no other concerns.     Review of patient's allergies indicates:  No Known Allergies  Past Medical History:   Diagnosis Date    Anemia     Chronic hypertension with exacerbation during pregnancy in second trimester 11/6/2020    Current regimen (11/6/20):  - Carvedilol 12.5 mg BID - Nifedipine 30 mg daily Baseline CKD + proteinuria    Chronic kidney disease     Depression     Diabetes mellitus     Diabetic retinopathy     Diarrhea     Encounter for blood transfusion     Gastroparesis     Glaucoma     Hepatomegaly 4/29/2021    Hx of psychiatric care     Hyperlipidemia     Hypertension     Nephrotic syndrome     Nephrotic syndrome 3/4/2021    Palpitations     Poor fetal growth affecting management of mother in second trimester 10/15/2020    Restrictive lung disease     Retinal detachment     Severe pre-eclampsia in second trimester 11/6/2020     Past Surgical History:   Procedure Laterality Date    AV FISTULA PLACEMENT Left " 4/7/2021    Procedure: CREATION, AV FISTULA;  Surgeon: Roberto Ryan MD;  Location: [facility] OR 2ND FLR;  Service: Peripheral Vascular;  Laterality: Left;    COLONOSCOPY N/A 3/16/2022    Procedure: COLONOSCOPY;  Surgeon: Tavo Kwok MD;  Location: [facility] ENDO (4TH FLR);  Service: Endoscopy;  Laterality: N/A;  Questionable history of delayed gastric emptying longstanding diabetes now on eating pre transplant workup for history of nausea vomiting which seems to have improved with dialysis also chronic diarrhea and history of anemia pre transplant workup for kidney transplant. 3    ESOPHAGOGASTRODUODENOSCOPY N/A 3/16/2022    Procedure: EGD (ESOPHAGOGASTRODUODENOSCOPY);  Surgeon: Tavo Kwok MD;  Location: [facility] ENDO (4TH FLR);  Service: Endoscopy;  Laterality: N/A;  Questionable history of delayed gastric emptying longstanding diabetes now on eating pre transplant workup for history of nausea vomiting which seems to have improved with dialysis also chronic diarrhea and history of anemia pre transplant workup for    FISTULOGRAM N/A 8/11/2021    Procedure: Fistulogram;  Surgeon: Roberto Ryan MD;  Location: [facility] CATH LAB;  Service: Cardiology;  Laterality: N/A;    PERCUTANEOUS TRANSLUMINAL ANGIOPLASTY OF ARTERIOVENOUS FISTULA N/A 8/11/2021    Procedure: PTA, AV FISTULA;  Surgeon: Roberto Ryan MD;  Location: [facility] CATH LAB;  Service: Cardiology;  Laterality: N/A;    REMOVAL IMPLANT, POSTERIOR SEGMENT, INTRAOCULAR Right 2/1/2022    Procedure: REMOVAL IMPLANT, POSTERIOR SEGMENT, INTRAOCULAR;  Surgeon: Maximilian Montaño MD;  Location: [facility] OR [address] [place] [address];  Service: Ophthalmology;  Laterality: Right;    REPAIR OF RETINAL DETACHMENT WITH VITRECTOMY Right 1/25/2022    Procedure: REPAIR, RETINAL DETACHMENT, WITH VITRECTOMY, MEMBRANE PEEL, LASER, INJECTION OF GAS VS OIL;  Surgeon: Maximilian Montaño MD;  Location: [facility] OR [address] [place] [address];  Service: Ophthalmology;  Laterality: Right;    REVISION OF  ARTERIOVENOUS FISTULA Left 12/10/2021    Procedure: REVISION, AV FISTULA with BVT;  Surgeon: Roberto Ryan MD;  Location: [facility] OR 2ND FLR;  Service: Peripheral Vascular;  Laterality: Left;    VITRECTOMY BY PARS PLANA APPROACH Right 2/1/2022    Procedure: VITRECTOMY, PARS PLANA APPROACH;  Surgeon: Maximilian Montaño MD;  Location: [facility] OR 1ST FLR;  Service: Ophthalmology;  Laterality: Right;     Family History   Problem Relation Age of Onset    Hypertension Mother     Heart disease Father     Diabetes Brother     Celiac disease Neg Hx     Cirrhosis Neg Hx     Colon cancer Neg Hx     Colon polyps Neg Hx     Crohn's disease Neg Hx     Inflammatory bowel disease Neg Hx     Liver cancer Neg Hx     Liver disease Neg Hx     Rectal cancer Neg Hx     Stomach cancer Neg Hx     Ulcerative colitis Neg Hx     Esophageal cancer Neg Hx     Hemochromatosis Neg Hx     Pancreatic cancer Neg Hx     Kidney cancer Neg Hx     Bladder Cancer Neg Hx     Uterine cancer Neg Hx     Ovarian cancer Neg Hx      Social History     Tobacco Use    Smoking status: Never Smoker    Smokeless tobacco: Never Used   Substance Use Topics    Alcohol use: No    Drug use: No     Review of Systems   Constitutional: Negative for chills, fever, malaise/fatigue and weight loss.   HENT: Negative for congestion, hearing loss, nosebleeds and sinus pain.    Eyes: Negative for blurred vision, double vision and photophobia.   Respiratory: Negative for cough and shortness of breath.    Cardiovascular: Negative for chest pain and palpitations.   Gastrointestinal: Negative for abdominal pain, constipation, diarrhea, nausea and vomiting.   Genitourinary: Negative for dysuria, frequency and urgency.   Skin: Negative for rash.   Neurological: Negative for dizziness, tingling, weakness and headaches.         Physical Exam     Initial Vitals   BP Pulse Resp Temp SpO2   05/17/22 0928 05/17/22 0928 05/17/22 0928 05/17/22 1056 05/17/22 0941    109/62 76 16 97.9 °F (36.6 °C) 98 %      MAP       --                Physical Exam    Nursing note and vitals reviewed.  Constitutional: She appears well-developed.   HENT:   Head: Normocephalic.   Right Ear: External ear normal.   Left Ear: External ear normal.   Mouth/Throat: Oropharynx is clear and moist.   Eyes: EOM are normal. Pupils are equal, round, and reactive to light. Right eye exhibits no discharge. Left eye exhibits no discharge.   Neck: No JVD present.   Cardiovascular: Normal rate and regular rhythm.   No murmur heard.  Pulmonary/Chest: Breath sounds normal. No respiratory distress. She has no wheezes. She has no rhonchi. She has no rales.   Musculoskeletal:         General: No tenderness or edema. Normal range of motion.      Comments: Fistula with thrill present with no signs of active bleeding     Neurological: She is alert.   Skin: Skin is warm. Capillary refill takes less than 2 seconds.         ED Course   Procedures  Labs Reviewed   CBC W/ AUTO DIFFERENTIAL - Abnormal; Notable for the following components:       Result Value    RBC 3.40 (*)     Hemoglobin 9.9 (*)     Hematocrit 30.4 (*)     RDW 16.6 (*)     Immature Granulocytes 0.7 (*)     All other components within normal limits   BASIC METABOLIC PANEL - Abnormal; Notable for the following components:    Potassium 5.9 (*)     Glucose 223 (*)     BUN 60 (*)     Creatinine 7.2 (*)     Calcium 7.9 (*)     eGFR if  8.2 (*)     eGFR if non  7.1 (*)     All other components within normal limits   BASIC METABOLIC PANEL - Abnormal; Notable for the following components:    Sodium 130 (*)     Potassium 7.7 (*)     Chloride 93 (*)     CO2 22 (*)     Glucose 547 (*)     BUN 62 (*)     Creatinine 7.8 (*)     Calcium 8.0 (*)     eGFR if  7.4 (*)     eGFR if non  6.5 (*)     All other components within normal limits   ISTAT PROCEDURE - Abnormal; Notable for the following components:    POC  Glucose 203 (*)     POC BUN 61 (*)     POC Creatinine 8.4 (*)     POC Sodium 134 (*)     POC Potassium 5.8 (*)     POC Ionized Calcium 0.97 (*)     POC Hematocrit 32 (*)     All other components within normal limits   POCT GLUCOSE - Abnormal; Notable for the following components:    POCT Glucose 486 (*)     All other components within normal limits   ISTAT PROCEDURE - Abnormal; Notable for the following components:    POC Glucose 505 (*)     POC BUN 83 (*)     POC Creatinine 8.5 (*)     POC Sodium 128 (*)     POC Potassium 7.7 (*)     POC Ionized Calcium 0.99 (*)     POC Hematocrit 33 (*)     All other components within normal limits   SARS-COV-2 RDRP GENE   POCT GLUCOSE, HAND-HELD DEVICE   ISTAT CHEM8   ISTAT CHEM8   POCT GLUCOSE MONITORING CONTINUOUS     EKG Readings: (Independently Interpreted)   Initial Reading: No STEMI. Rhythm: Normal Sinus Rhythm. Heart Rate: 77. Ectopy: No Ectopy. Clinical Impression: Normal Sinus Rhythm   qtc 513  Peaked T V2     ECG Results          EKG 12-lead (Final result)  Result time 05/17/22 17:04:43    Final result by Interface, Lab In [facility] (05/17/22 17:04:43)                 Narrative:    Test Reason : E87.5,    Vent. Rate : 081 BPM     Atrial Rate : 081 BPM     P-R Int : 204 ms          QRS Dur : 090 ms      QT Int : 426 ms       P-R-T Axes : 060 098 049 degrees     QTc Int : 494 ms    Normal sinus rhythm  Possible Left atrial enlargement  Rightward axis  Prolonged QT  Abnormal ECG  When compared with ECG of 17-MAY-2022 10:28,  No significant change was found  Confirmed by TRISTON CONTRERAS MD ([id number]) on 5/17/2022 5:04:35 PM    Referred By: LILI TORRES           Confirmed By:TRISTON CONTRERAS MD                             EKG 12-lead (Final result)  Result time 05/17/22 11:53:07    Final result by Interface, Lab In [facility] (05/17/22 11:53:07)                 Narrative:    Test Reason : E87.5,    Vent. Rate : 077 BPM     Atrial Rate : 077 BPM     P-R Int : 204 ms          QRS Dur :  084 ms      QT Int : 454 ms       P-R-T Axes : 062 086 045 degrees     QTc Int : 513 ms    Normal sinus rhythm  Possible Left atrial enlargement  Low anterior forces- Cannot rule out Anterior infarct (cited on or before   but doubt possibly lead placement  Prolonged QT  Abnormal ECG  When compared with ECG of 08-MAY-2022 08:10,  Vent. rate has decreased BY  50 BPM  Confirmed by Sohail SANZ MD [id number]) on 5/17/2022 11:52:53 AM    Referred By: LILI TORRES           Confirmed By:Sohail SANZ MD                            Imaging Results    None          Medications   dextrose 10% bolus 250 mL (0 g Intravenous Stopped 5/17/22 1310)   insulin regular injection 5 Units (5 Units Intravenous Not Given 5/17/22 1330)   sodium zirconium cyclosilicate packet 10 g (10 g Oral Not Given 5/17/22 1330)   dextrose 10% bolus 125 mL (has no administration in time range)   dextrose 10% bolus 250 mL (has no administration in time range)   sodium chloride 0.9% flush 10 mL (has no administration in time range)   melatonin tablet 6 mg (has no administration in time range)   sodium chloride 0.9% flush 5 mL (has no administration in time range)   albuterol-ipratropium 2.5 mg-0.5 mg/3 mL nebulizer solution 3 mL (has no administration in time range)   ondansetron disintegrating tablet 8 mg (has no administration in time range)   prochlorperazine injection Soln 5 mg (has no administration in time range)   bisacodyL suppository 10 mg (has no administration in time range)   simethicone chewable tablet 80 mg (has no administration in time range)   aluminum-magnesium hydroxide-simethicone 200-200-20 mg/5 mL suspension 30 mL (has no administration in time range)   acetaminophen tablet 650 mg (has no administration in time range)   acetaminophen tablet 1,000 mg (has no administration in time range)   naloxone 0.4 mg/mL injection 0.02 mg (has no administration in time range)   glucose chewable tablet 16 g (has no administration in time range)   glucose  chewable tablet 24 g (has no administration in time range)   glucagon (human recombinant) injection 1 mg (has no administration in time range)   insulin aspart U-100 pen 0-5 Units (has no administration in time range)   insulin detemir U-100 pen 12 Units (has no administration in time range)   insulin aspart U-100 pen 1-10 Units (has no administration in time range)   insulin aspart U-100 pen 5 Units (has no administration in time range)   sodium chloride 0.9% flush 3 mL (has no administration in time range)   calcium gluconate 1 g in NS IVPB (premixed) (0 g Intravenous Stopped 5/17/22 1310)   insulin regular injection 5 Units (5 Units Intravenous Given 5/17/22 1217)   albuterol sulfate nebulizer solution 10 mg (10 mg Nebulization Given 5/17/22 1209)   albuterol sulfate nebulizer solution 10 mg (10 mg Nebulization Given 5/17/22 1341)   0.9%  NaCl infusion ( Intravenous New Bag 5/17/22 1456)     Medical Decision Making:   History:   Old Medical Records: I decided to obtain old medical records.  Differential Diagnosis:   Including, but not limited to:  Anemia  Hyperkalemia  Hyperglycemia  Fistula problem  Independently Interpreted Test(s):   I have ordered and independently interpreted EKG Reading(s) - see prior notes  Clinical Tests:   Lab Tests: Reviewed and Ordered  Radiological Study: Ordered and Reviewed  Medical Tests: Reviewed and Ordered  ED Management:  28 yo female presenting for evaluation of blood loss.  Fistula hemostatic on arrival, no s/sx concerning for anemia, hemodynamically stable.  Hyperkalemic, worsened on repeat.  Case discussed with Hospital Medicine and nephrology, admitted for hemodialysis.  Other:   I have discussed this case with another health care provider.            Attending Attestation:   Physician Attestation Statement for Resident:  As the supervising MD   Physician Attestation Statement: I have personally seen and examined this patient.   I agree with the above history. -:  27-year-old female sent from dialysis clinic for bleeding fistula.  Denies lightheadedness, shortness of breath, fatigue.   As the supervising MD I agree with the above PE.   -: Left upper extremity fistula with bandage, no active bleeding, palpable thrill, audible bruit, no overlying erythema  No tachycardia, lungs clear  No respiratory distress, euvolemic   As the supervising MD I agree with the above treatment, course, plan, and disposition.   -: Anemia, down-trended from most recent (11->9.9).  Hyperkalemia, shifted, not improved.  Hyperglycemia, no AG to suggest DKA.  D/w nephrology for dialysis, admitted to Hospital Medicine.   I have reviewed and agree with the residents interpretation of the following: lab data and EKG.  I have reviewed the following: old records at this facility.                ED Course as of 05/17/22 2100   Tue May 17, 2022   1015 Hemoglobin(!): 9.9 [CH]   1015 Hematocrit(!): 30.4 [CH]   1017 POC Creatinine(!): 8.4 [CH]   1018 POC Potassium(!): 5.8 [CH]   1018 ISTAT PROCEDURE(!)  Based on these findings ordering BMP to further evaluation.  [CH]   1018 Have spoken to social work and they are going to reach out to the dialysis center to see if the patient will be able to come back for dialysis to be preformed today.  [CH]   1124 Potassium(!): 5.9 [CH]   1124 Creatinine(!): 7.2 [CH]   1144 Hemoglobin(!): 9.9  Most recent 11 [AB]   1213 Patient going to be shifted while in the ED and then discharged to her dialysis appointment.  [CH]   1328 Spoke to nephrology NP who is going to arrange dialysis for the patient.  [CH]   1334 Medicine has been contacted for admission for worsening K+ despite shifting.  [CH]   1404 Patient is being transported to dialysis at this time.  [CH]   1409 Medicine is going to admit the patient for observation following dialysis treatment.  [CH]      ED Course User Index  [AB] Fede Bocanegra MD  [CH] Deacon Redding MD             Clinical Impression:   Final  diagnoses:  [E87.5] Hyperkalemia  [D64.9] Anemia, unspecified type (Primary)  [N18.6] ESRD (end stage renal disease)          ED Disposition Condition    Observation               Deacon Redding MD  Resident  05/17/22 1957       Fede Bocanegra MD  05/17/22 2100

## 2022-05-17 NOTE — ASSESSMENT & PLAN NOTE
-last A1c 8.3  -, monitoring  -home regimen held: lispro 10 units qam 12 units qhs, aspart 10 units tidwm  -hos regimen: detemir 12 units bid, aspart 5 units tidwm, mod dose SSI  -diabetic diet  -q4h accuchecks

## 2022-05-17 NOTE — ASSESSMENT & PLAN NOTE
Nephrology History  iHD Schedule: TTS, last dialysis 5/14  Unit/MD: [facility]/ Dr. Ebony Gregory   Duration: 3.5 hrs   UF: 3-4 L   EDW: 54.5 kg   Access: LINO AVF   Residual Renal Function: minimal     Assessment:   - Will provide emergent dialysis for metabolic clearance and volume management this afternoon 5/17  - Hyperkalemia 7.7- shifted in ED x2    - Dialysate adjusted to current labs   - Will obtain OP dialysis records  - Continue to monitor intake and output, daily weights   - Avoid nephrotoxic medication and renal dose medications to GFR  - Will continue to monitor    Anemia of Chronic Kidney Disease   - Hgb 9.9  .Goal in ESRD is Hgb of 10-11.   - Will review chronic care plan from outpatient dialysis center for FATOU dosing.     Mineral Bone Disease in CKD   - Recommend renal diet. Phos   - Continue home phos binder   - Daily renal panel so that phos and albumin is monitored daily.   - Vitamin D and PTH to be checked with am labs.

## 2022-05-17 NOTE — HPI
"Ms. Read is a 27 year old female with history of ESRD (TTHS), HTN, DM, who presents for fistula sight bleeding. Patient states that during dialysis today after the needle was placed the machine was not acting right. States that the dialysis center disconnected her from the machine with the needle still her her arm. States she went to use the restroom and the needle came out accidentally and she had some bleeding from the site. States it was "a lot of blood", with bleeding controlled with direct pressure. Patient states she was told to come to the hospital for evaluation of her CBC to ensure she didn't need a transfusion. Denies f/c, lightheadedness, headache, chest pain, SOB, n/v/d, abdominal pain, dysuria, leg swelling.    In ED, AFVSS. Hgb 9.9. CMP significant for Na 130, K 7.7, Cl 93, HCO3 22, AG 15, . Pt given IVF, K shifted, calcium gluconate. Nephrology consulted for urgent HD needs.  "

## 2022-05-17 NOTE — PROGRESS NOTES
4 hours HD tx stopped 30 mins early due to system clotted. 2.2 L of fluid removed. Patient tolerated well. Needles pulled, manual pressure held until hemostasis was achieved.

## 2022-05-17 NOTE — DISCHARGE INSTRUCTIONS
You are going to be discharged and sent directly to dialysis. We have been informed that you have a chair available to complete your dialysis today. Please make sure to attend the appointment today.      Please return to the emergency department if you have weakness, shortness of breath, chest pain, bleeding, or any other concerns you feel should be evaluated by an emergency room physician.     Your feedback is important to us.  If you should receive a survey in the next few days, please share your experience with us.

## 2022-05-17 NOTE — PROGRESS NOTES
Report received from MARCE Perales RN. Patient arrived from ED via stretcher. AAOx4. Maintenance HD initiated via [facility] AVG with 15g needles. .

## 2022-05-17 NOTE — ED NOTES
I-STAT Chem-8+ Results:   Value Reference Range   Sodium 134 136-145 mmol/L   Potassium  5.8 3.5-5.1 mmol/L   Chloride 97  mmol/L   Ionized Calcium 0.97 1.06-1.42 mmol/L   CO2 (measured) 29 23-29 mmol/L   Glucose 203  mg/dL   BUN 61 6-30 mg/dL   Creatinine 8.4 0.5-1.4 mg/dL   Hematocrit 32 36-54%

## 2022-05-17 NOTE — CONSULTS
"[facility]  Nephrology  Consult Note    Patient Name: Isabela Read  MRN: [id number]  Admission Date: 5/17/2022  Hospital Length of Stay: 0 days  Attending Provider: Cornelius Palomares MD   Primary Care Physician: Tavo Bhatia MD  Principal Problem:Hyperkalemia    Inpatient consult to Nephrology  Consult performed by: Lashanda Ruiz DNP  Consult ordered by: Lee Green Jr., PA-C  Reason for consult: ESRD on HD, hyperkalemia         Subjective:     HPI: Isabela Read is a 27 year old female with history of ESRD (TTHS), HTN, DM, who presents to the ED for evaluation for fistula bleeding. Pt reports they let her use the bathroom at dilaysis unit and needle came out. States there was "a lot of blood", with bleeding controlled with direct pressure. There is no bleeding at this time. Was told by dialysis unit to come in for evaluation of CBC to ensure she didn't need a blood transfusions. Subsequently they were not able to dialyze her. Denies SOB,chest pain, N/V/D, fever, cough, or chills. Last dialysis per patient on Saturday 5/14. K 7.7 today after being shifted in ED. Nephrology consulted for ESRD on HD.       Past Medical History:   Diagnosis Date    Anemia     Chronic hypertension with exacerbation during pregnancy in second trimester 11/6/2020    Current regimen (11/6/20):  - Carvedilol 12.5 mg BID - Nifedipine 30 mg daily Baseline CKD + proteinuria    Chronic kidney disease     Depression     Diabetes mellitus     Diabetic retinopathy     Diarrhea     Encounter for blood transfusion     Gastroparesis     Glaucoma     Hepatomegaly 4/29/2021    Hx of psychiatric care     Hyperlipidemia     Hypertension     Nephrotic syndrome     Nephrotic syndrome 3/4/2021    Palpitations     Poor fetal growth affecting management of mother in second trimester 10/15/2020    Restrictive lung disease     Retinal detachment     Severe pre-eclampsia in second trimester 11/6/2020 "       Past Surgical History:   Procedure Laterality Date    AV FISTULA PLACEMENT Left 4/7/2021    Procedure: CREATION, AV FISTULA;  Surgeon: Roberto Ryan MD;  Location: [facility] OR 2ND FLR;  Service: Peripheral Vascular;  Laterality: Left;    COLONOSCOPY N/A 3/16/2022    Procedure: COLONOSCOPY;  Surgeon: Tavo Kwok MD;  Location: [facility] ENDO (4TH FLR);  Service: Endoscopy;  Laterality: N/A;  Questionable history of delayed gastric emptying longstanding diabetes now on eating pre transplant workup for history of nausea vomiting which seems to have improved with dialysis also chronic diarrhea and history of anemia pre transplant workup for kidney transplant. 3    ESOPHAGOGASTRODUODENOSCOPY N/A 3/16/2022    Procedure: EGD (ESOPHAGOGASTRODUODENOSCOPY);  Surgeon: Tavo Kwok MD;  Location: [facility] ENDO (4TH FLR);  Service: Endoscopy;  Laterality: N/A;  Questionable history of delayed gastric emptying longstanding diabetes now on eating pre transplant workup for history of nausea vomiting which seems to have improved with dialysis also chronic diarrhea and history of anemia pre transplant workup for    FISTULOGRAM N/A 8/11/2021    Procedure: Fistulogram;  Surgeon: Roberto Ryan MD;  Location: [facility] CATH LAB;  Service: Cardiology;  Laterality: N/A;    PERCUTANEOUS TRANSLUMINAL ANGIOPLASTY OF ARTERIOVENOUS FISTULA N/A 8/11/2021    Procedure: PTA, AV FISTULA;  Surgeon: Roberto Ryan MD;  Location: [facility] CATH LAB;  Service: Cardiology;  Laterality: N/A;    REMOVAL IMPLANT, POSTERIOR SEGMENT, INTRAOCULAR Right 2/1/2022    Procedure: REMOVAL IMPLANT, POSTERIOR SEGMENT, INTRAOCULAR;  Surgeon: Maximilian Montaño MD;  Location: [facility] OR 1ST FLR;  Service: Ophthalmology;  Laterality: Right;    REPAIR OF RETINAL DETACHMENT WITH VITRECTOMY Right 1/25/2022    Procedure: REPAIR, RETINAL DETACHMENT, WITH VITRECTOMY, MEMBRANE PEEL, LASER, INJECTION OF GAS VS OIL;  Surgeon: Maximilian Montaño MD;   Location: [facility] OR 1ST FLR;  Service: Ophthalmology;  Laterality: Right;    REVISION OF ARTERIOVENOUS FISTULA Left 12/10/2021    Procedure: REVISION, AV FISTULA with BVT;  Surgeon: Roberto Ryan MD;  Location: [facility] OR 2ND FLR;  Service: Peripheral Vascular;  Laterality: Left;    VITRECTOMY BY PARS PLANA APPROACH Right 2/1/2022    Procedure: VITRECTOMY, PARS PLANA APPROACH;  Surgeon: Maximilian Montaño MD;  Location: [facility] OR 1ST FLR;  Service: Ophthalmology;  Laterality: Right;       Review of patient's allergies indicates:  No Known Allergies  Current Facility-Administered Medications   Medication Frequency    0.9%  NaCl infusion Once    acetaminophen tablet 1,000 mg Q8H PRN    acetaminophen tablet 650 mg Q4H PRN    albuterol-ipratropium 2.5 mg-0.5 mg/3 mL nebulizer solution 3 mL Q4H PRN    aluminum-magnesium hydroxide-simethicone 200-200-20 mg/5 mL suspension 30 mL QID PRN    bisacodyL suppository 10 mg Daily PRN    dextrose 10% bolus 125 mL PRN    dextrose 10% bolus 250 mL PRN    dextrose 10% bolus 250 mL PRN    glucagon (human recombinant) injection 1 mg PRN    glucose chewable tablet 16 g PRN    glucose chewable tablet 24 g PRN    insulin aspart U-100 pen 0-5 Units QID (AC + HS) PRN    insulin regular injection 5 Units ED 1 Time    melatonin tablet 6 mg Nightly PRN    naloxone 0.4 mg/mL injection 0.02 mg PRN    ondansetron disintegrating tablet 8 mg Q8H PRN    prochlorperazine injection Soln 5 mg Q6H PRN    simethicone chewable tablet 80 mg QID PRN    sodium chloride 0.9% flush 10 mL PRN    sodium chloride 0.9% flush 5 mL PRN    sodium zirconium cyclosilicate packet 10 g ED 1 Time     Current Outpatient Medications   Medication    aspirin (ECOTRIN) 81 MG EC tablet    blood sugar diagnostic Strp    calcitRIOL (ROCALTROL) 0.5 MCG Cap    carvediloL (COREG) 25 MG tablet    flash glucose scanning reader (FREESTYLE MARCY 14 DAY READER) Misc    flash glucose sensor (FREESTYLE MARCY  "14 DAY SENSOR) Kit    fluticasone propionate (FLONASE) 50 mcg/actuation nasal spray    hydrALAZINE (APRESOLINE) 25 MG tablet    insulin detemir U-100 (LEVEMIR FLEXTOUCH) 100 unit/mL (3 mL) SubQ InPn pen    insulin lispro (HUMALOG KWIKPEN INSULIN) 100 unit/mL pen    lancets Misc    multivit-min/folic acid/biotin (HAIR,SKIN AND NAILS,FA-BIOTIN, ORAL)    NIFEdipine (PROCARDIA-XL) 60 MG (OSM) 24 hr tablet    pen needle, diabetic 32 gauge x 5/32" Ndle    sevelamer carbonate (RENVELA) 800 mg Tab    traZODone (DESYREL) 50 MG tablet    valsartan (DIOVAN) 160 MG tablet    venlafaxine (EFFEXOR XR) 37.5 MG 24 hr capsule     Facility-Administered Medications Ordered in Other Encounters   Medication Frequency    0.9%  NaCl infusion Continuous     Family History       Problem Relation (Age of Onset)    Diabetes Brother    Heart disease Father    Hypertension Mother          Tobacco Use    Smoking status: Never Smoker    Smokeless tobacco: Never Used   Substance and Sexual Activity    Alcohol use: No    Drug use: No    Sexual activity: Not Currently     Partners: Male     Comment: monogamous     Review of Systems   Constitutional: Negative.    HENT: Negative.     Eyes: Negative.    Respiratory: Negative.     Cardiovascular: Negative.    Gastrointestinal: Negative.    Endocrine: Negative.    Genitourinary:  Positive for decreased urine volume.   Musculoskeletal: Negative.    Neurological: Negative.    Hematological: Negative.    Psychiatric/Behavioral: Negative.     Objective:     Vital Signs (Most Recent):  Temp: 97.9 °F (36.6 °C) (05/17/22 1056)  Pulse: 78 (05/17/22 1341)  Resp: 18 (05/17/22 1341)  BP: 109/62 (05/17/22 0928)  SpO2: 98 % (05/17/22 0941)  O2 Device (Oxygen Therapy): room air (05/17/22 1344) Vital Signs (24h Range):  Temp:  [97.9 °F (36.6 °C)] 97.9 °F (36.6 °C)  Pulse:  [76-78] 78  Resp:  [16-18] 18  SpO2:  [98 %] 98 %  BP: (109)/(62) 109/62        There is no height or weight on file to calculate " BMI.  There is no height or weight on file to calculate BSA.    No intake/output data recorded.    Physical Exam  Vitals and nursing note reviewed.   HENT:      Head: Normocephalic.      Mouth/Throat:      Pharynx: Oropharynx is clear.   Eyes:      Conjunctiva/sclera: Conjunctivae normal.   Cardiovascular:      Rate and Rhythm: Normal rate.      Pulses: Normal pulses.   Pulmonary:      Effort: Pulmonary effort is normal.   Abdominal:      Palpations: Abdomen is soft.   Musculoskeletal:      Cervical back: Neck supple.      Right lower leg: No edema.      Left lower leg: No edema.   Neurological:      Mental Status: She is alert and oriented to person, place, and time.   Psychiatric:         Mood and Affect: Mood normal.       Significant Labs:  CBC:   Recent Labs   Lab 05/17/22  0955 05/17/22  1000 05/17/22  1320   WBC 5.92  --   --    RBC 3.40*  --   --    HGB 9.9*  --   --    HCT 30.4*   < > 33*     --   --    MCV 89  --   --    MCH 29.1  --   --    MCHC 32.6  --   --     < > = values in this interval not displayed.     CMP:   Recent Labs   Lab 05/17/22  1327   *   CALCIUM 8.0*   *   K 7.7*   CO2 22*   CL 93*   BUN 62*   CREATININE 7.8*     All labs within the past 24 hours have been reviewed.        Assessment/Plan:     * Hyperkalemia  - Plan for emergent dialysis  - shifted in ED x 2     End stage renal failure on dialysis  Nephrology History  i[facility] Schedule: TTS, last dialysis 5/14  Unit/MD: [facility]/ Dr. Ebony Gregory   Duration: 3.5 hrs   UF: 3-4 L   EDW: 54.5 kg   Access: LINO AVF   Residual Renal Function: minimal     Assessment:   - Will provide emergent dialysis for metabolic clearance and volume management this afternoon 5/17  - Hyperkalemia 7.7- shifted in ED x2    - Dialysate adjusted to current labs   - Will obtain OP dialysis records  - Continue to monitor intake and output, daily weights   - Avoid nephrotoxic medication and renal dose medications to GFR  - Will continue to  monitor    Anemia of Chronic Kidney Disease   - Hgb 9.9  .Goal in ESRD is Hgb of 10-11.   - Will review chronic care plan from outpatient dialysis center for FATOU dosing.     Mineral Bone Disease in CKD   - Recommend renal diet. Phos   - Continue home phos binder   - Daily renal panel so that phos and albumin is monitored daily.   - Vitamin D and PTH to be checked with am labs.                Thank you for your consult. I will follow-up with patient. Please contact us if you have any additional questions.    Lashanda Ruiz DNP  Nephrology  [facility]

## 2022-05-17 NOTE — SUBJECTIVE & OBJECTIVE
Past Medical History:   Diagnosis Date    Anemia     Chronic hypertension with exacerbation during pregnancy in second trimester 11/6/2020    Current regimen (11/6/20):  - Carvedilol 12.5 mg BID - Nifedipine 30 mg daily Baseline CKD + proteinuria    Chronic kidney disease     Depression     Diabetes mellitus     Diabetic retinopathy     Diarrhea     Encounter for blood transfusion     Gastroparesis     Glaucoma     Hepatomegaly 4/29/2021    Hx of psychiatric care     Hyperlipidemia     Hypertension     Nephrotic syndrome     Nephrotic syndrome 3/4/2021    Palpitations     Poor fetal growth affecting management of mother in second trimester 10/15/2020    Restrictive lung disease     Retinal detachment     Severe pre-eclampsia in second trimester 11/6/2020       Past Surgical History:   Procedure Laterality Date    AV FISTULA PLACEMENT Left 4/7/2021    Procedure: CREATION, AV FISTULA;  Surgeon: Roberto Ryan MD;  Location: [facility] OR 2ND FLR;  Service: Peripheral Vascular;  Laterality: Left;    COLONOSCOPY N/A 3/16/2022    Procedure: COLONOSCOPY;  Surgeon: Tavo Kwok MD;  Location: [facility] (4TH FLR);  Service: Endoscopy;  Laterality: N/A;  Questionable history of delayed gastric emptying longstanding diabetes now on eating pre transplant workup for history of nausea vomiting which seems to have improved with dialysis also chronic diarrhea and history of anemia pre transplant workup for kidney transplant. 3    ESOPHAGOGASTRODUODENOSCOPY N/A 3/16/2022    Procedure: EGD (ESOPHAGOGASTRODUODENOSCOPY);  Surgeon: Tavo Kwok MD;  Location: [facility] (4TH FLR);  Service: Endoscopy;  Laterality: N/A;  Questionable history of delayed gastric emptying longstanding diabetes now on eating pre transplant workup for history of nausea vomiting which seems to have improved with dialysis also chronic diarrhea and history of anemia pre transplant workup for    FISTULOGRAM N/A 8/11/2021    Procedure: Fistulogram;   Surgeon: Roberto Ryan MD;  Location: [facility] CATH LAB;  Service: Cardiology;  Laterality: N/A;    PERCUTANEOUS TRANSLUMINAL ANGIOPLASTY OF ARTERIOVENOUS FISTULA N/A 8/11/2021    Procedure: PTA, AV FISTULA;  Surgeon: Roberto Ryan MD;  Location: [facility] CATH LAB;  Service: Cardiology;  Laterality: N/A;    REMOVAL IMPLANT, POSTERIOR SEGMENT, INTRAOCULAR Right 2/1/2022    Procedure: REMOVAL IMPLANT, POSTERIOR SEGMENT, INTRAOCULAR;  Surgeon: Maximiilan Montaño MD;  Location: [facility] OR [address] [place] [address];  Service: Ophthalmology;  Laterality: Right;    REPAIR OF RETINAL DETACHMENT WITH VITRECTOMY Right 1/25/2022    Procedure: REPAIR, RETINAL DETACHMENT, WITH VITRECTOMY, MEMBRANE PEEL, LASER, INJECTION OF GAS VS OIL;  Surgeon: Maximilian Montaño MD;  [facility];  Service: Ophthalmology;  Laterality: Right;    REVISION OF ARTERIOVENOUS FISTULA Left 12/10/2021    Procedure: REVISION, AV FISTULA with BVT;  Surgeon: Roberto Ryan MD;  Location: [facility] OR 2ND FLR;  Service: Peripheral Vascular;  Laterality: Left;    VITRECTOMY BY PARS PLANA APPROACH Right 2/1/2022    Procedure: VITRECTOMY, PARS PLANA APPROACH;  Surgeon: Maximilian Montaño MD;  Location: [facility] OR [address] [place] [address];  Service: Ophthalmology;  Laterality: Right;       Review of patient's allergies indicates:  No Known Allergies    Current Facility-Administered Medications on File Prior to Encounter   Medication    0.9%  NaCl infusion     Current Outpatient Medications on File Prior to Encounter   Medication Sig    aspirin (ECOTRIN) 81 MG EC tablet Take 81 mg by mouth once daily.    blood sugar diagnostic Strp To check BG 4 - 6 times daily, to use with insurance preferred meter    calcitRIOL (ROCALTROL) 0.5 MCG Cap Take 0.5 mcg by mouth once daily.    carvediloL (COREG) 25 MG tablet Take 25 mg by mouth 2 (two) times daily with meals.    flash glucose scanning reader ([employer] MARCY 14 DAY READER) Misc 1 each by Misc.(Non-Drug; Combo Route) route once  "daily.    flash glucose sensor (FREESTYLE MARCY 14 DAY SENSOR) Kit 6 kits by Misc.(Non-Drug; Combo Route) route once daily.    fluticasone propionate (FLONASE) 50 mcg/actuation nasal spray 1 spray by Each Nostril route daily as needed for Rhinitis or Allergies.    hydrALAZINE (APRESOLINE) 25 MG tablet Take 2 tablets (50 mg total) by mouth every 8 (eight) hours.    insulin detemir U-100 (LEVEMIR FLEXTOUCH) 100 unit/mL (3 mL) SubQ InPn pen 10 units subcutaneously in the morning and 12 units sq at night (Patient taking differently: Inject 10 units subcutaneously in the morning and 12 units sq at night)    insulin lispro (HUMALOG KWIKPEN INSULIN) 100 unit/mL pen Inject 10 units into skin the skin 3 three times daily with meals    lancets Misc To check BG 4 - 6 times daily, to use with insurance preferred meter    multivit-min/folic acid/biotin (HAIR,SKIN AND NAILS,FA-BIOTIN, ORAL) Take 1 tablet by mouth once daily.    NIFEdipine (PROCARDIA-XL) 60 MG (OSM) 24 hr tablet Take 60 mg by mouth 2 (two) times a day.    pen needle, diabetic 32 gauge x 5/32" Ndle For three times daily injection    sevelamer carbonate (RENVELA) 800 mg Tab TAKE 1 TABLET BY MOUTH THREE TIMES DAILY WITH MEALS    traZODone (DESYREL) 50 MG tablet Take 1 tablet (50 mg total) by mouth nightly as needed for Insomnia.    valsartan (DIOVAN) 160 MG tablet Take 1 tablet (160 mg total) by mouth once daily. Hold off on taking until told to restart by a physician.    venlafaxine (EFFEXOR XR) 37.5 MG 24 hr capsule Take 1 capsule (37.5 mg total) by mouth once daily.     Family History       Problem Relation (Age of Onset)    Diabetes Brother    Heart disease Father    Hypertension Mother          Tobacco Use    Smoking status: Never Smoker    Smokeless tobacco: Never Used   Substance and Sexual Activity    Alcohol use: No    Drug use: No    Sexual activity: Not Currently     Partners: Male     Comment: monogamous     Review of Systems   Constitutional:  Negative " for activity change, chills, diaphoresis, fatigue and fever.   HENT:  Negative for congestion, facial swelling, rhinorrhea and sore throat.    Eyes:  Negative for photophobia, itching and visual disturbance.   Respiratory:  Negative for cough, chest tightness, shortness of breath and wheezing.    Cardiovascular:  Negative for chest pain, palpitations and leg swelling.   Gastrointestinal:  Negative for abdominal distention, abdominal pain, blood in stool, constipation, diarrhea, nausea and vomiting.   Genitourinary:  Positive for decreased urine volume. Negative for difficulty urinating, dysuria, frequency, hematuria and urgency.   Musculoskeletal:  Negative for arthralgias, back pain and neck stiffness.   Neurological:  Negative for dizziness, tremors, seizures, syncope, weakness, light-headedness, numbness and headaches.   Psychiatric/Behavioral:  Negative for agitation, confusion and hallucinations.    Objective:     Vital Signs (Most Recent):  Temp: 97.6 °F (36.4 °C) (05/17/22 1420)  Pulse: 89 (05/17/22 1500)  Resp: 18 (05/17/22 1420)  BP: (!) 152/82 (05/17/22 1500)  SpO2: 98 % (05/17/22 0941)   Vital Signs (24h Range):  Temp:  [97.6 °F (36.4 °C)-97.9 °F (36.6 °C)] 97.6 °F (36.4 °C)  Pulse:  [76-89] 89  Resp:  [16-18] 18  SpO2:  [98 %] 98 %  BP: (109-152)/(62-82) 152/82        There is no height or weight on file to calculate BMI.    Physical Exam  Vitals and nursing note reviewed.   Constitutional:       General: She is not in acute distress.     Appearance: She is well-developed. She is not diaphoretic.   HENT:      Head: Normocephalic and atraumatic.      Right Ear: External ear normal.      Left Ear: External ear normal.      Nose: Nose normal. No congestion.      Mouth/Throat:      Pharynx: Oropharynx is clear.   Eyes:      General: No scleral icterus.     Extraocular Movements: Extraocular movements intact.   Cardiovascular:      Rate and Rhythm: Normal rate and regular rhythm.      Pulses: Normal pulses.       Heart sounds: Normal heart sounds. No murmur heard.  Pulmonary:      Effort: Pulmonary effort is normal. No respiratory distress.      Breath sounds: Normal breath sounds. No wheezing or rales.   Abdominal:      General: Bowel sounds are normal. There is no distension.      Palpations: Abdomen is soft.      Tenderness: There is no abdominal tenderness. There is no guarding or rebound.   Musculoskeletal:      Cervical back: Normal range of motion.      Right lower leg: No edema.      Left lower leg: No edema.   Skin:     General: Skin is warm and dry.      Capillary Refill: Capillary refill takes less than 2 seconds.   Neurological:      General: No focal deficit present.      Mental Status: She is alert and oriented to person, place, and time. Mental status is at baseline.   Psychiatric:         Mood and Affect: Mood normal.         Behavior: Behavior normal.         Thought Content: Thought content normal.           Significant Labs: All pertinent labs within the past 24 hours have been reviewed.  CBC:   Recent Labs   Lab 05/17/22  0955 05/17/22  1000 05/17/22  1320   WBC 5.92  --   --    HGB 9.9*  --   --    HCT 30.4* 32* 33*     --   --      CMP:   Recent Labs   Lab 05/17/22  1030 05/17/22  1327    130*   K 5.9* 7.7*   CL 96 93*   CO2 26 22*   * 547*   BUN 60* 62*   CREATININE 7.2* 7.8*   CALCIUM 7.9* 8.0*   ANIONGAP 15 15   EGFRNONAA 7.1* 6.5*       Significant Imaging: I have reviewed all pertinent imaging results/findings within the past 24 hours.

## 2022-05-17 NOTE — PLAN OF CARE
Initial Discharge Planning Case Management Assessment:    Patient admitted on 5-17-22  Chart reviewed  Care plan discussed with treatment team,    attending Dr Bocanegra    Current dispo: home today or tomorrow  Ms Read's car is at her dialysis center, [employer] will transport when medically cleared  [facility]  [address]  [place]  [phone]    Consults following: case mgt  Case management  to follow       05/17/22 1151   Discharge Assessment   Assessment Type Discharge Planning Assessment   Confirmed/corrected address, phone number and insurance Yes   Confirmed Demographics Correct on Facesheet   Source of Information patient   Communicated TITA with patient/caregiver Yes   Discharge Plan A Home   Discharge Barriers Identified None

## 2022-05-17 NOTE — H&P
"[facility] - [facility]  Hospital Medicine  History & Physical    Patient Name: Isabela Read  MRN: [id number]  Patient Class: OP- Observation  Admission Date: 5/17/2022  Attending Physician: Cornelius Palomares MD   Primary Care Provider: aTvo Bhatia MD         Patient information was obtained from patient and ER records.     Subjective:     Principal Problem:Hyperkalemia    Chief Complaint:   Chief Complaint   Patient presents with    Bleeding     Bleeding from Dialysis graft  controlled on arrival        HPI: Ms. Read is a 27 year old female with history of ESRD (TTHS), HTN, DM, who presents for fistula sight bleeding. Patient states that during dialysis today after the needle was placed the machine was not acting right. States that the dialysis center disconnected her from the machine with the needle still her her arm. States she went to use the restroom and the needle came out accidentally and she had some bleeding from the site. States it was "a lot of blood", with bleeding controlled with direct pressure. Patient states she was told to come to the hospital for evaluation of her CBC to ensure she didn't need a transfusion. Denies f/c, lightheadedness, headache, chest pain, SOB, n/v/d, abdominal pain, dysuria, leg swelling.    In ED, AFVSS. Hgb 9.9. CMP significant for Na 130, K 7.7, Cl 93, HCO3 22, AG 15, . Pt given IVF, K shifted, calcium gluconate. Nephrology consulted for urgent HD needs.      Past Medical History:   Diagnosis Date    Anemia     Chronic hypertension with exacerbation during pregnancy in second trimester 11/6/2020    Current regimen (11/6/20):  - Carvedilol 12.5 mg BID - Nifedipine 30 mg daily Baseline CKD + proteinuria    Chronic kidney disease     Depression     Diabetes mellitus     Diabetic retinopathy     Diarrhea     Encounter for blood transfusion     Gastroparesis     Glaucoma     Hepatomegaly 4/29/2021    Hx of psychiatric care     Hyperlipidemia  "    Hypertension     Nephrotic syndrome     Nephrotic syndrome 3/4/2021    Palpitations     Poor fetal growth affecting management of mother in second trimester 10/15/2020    Restrictive lung disease     Retinal detachment     Severe pre-eclampsia in second trimester 11/6/2020       Past Surgical History:   Procedure Laterality Date    AV FISTULA PLACEMENT Left 4/7/2021    Procedure: CREATION, AV FISTULA;  Surgeon: Roberto Ryan MD;  Location: [facility] OR 2ND FLR;  Service: Peripheral Vascular;  Laterality: Left;    COLONOSCOPY N/A 3/16/2022    Procedure: COLONOSCOPY;  Surgeon: Tavo Kwok MD;  Location: [facility] (4TH FLR);  Service: Endoscopy;  Laterality: N/A;  Questionable history of delayed gastric emptying longstanding diabetes now on eating pre transplant workup for history of nausea vomiting which seems to have improved with dialysis also chronic diarrhea and history of anemia pre transplant workup for kidney transplant. 3    ESOPHAGOGASTRODUODENOSCOPY N/A 3/16/2022    Procedure: EGD (ESOPHAGOGASTRODUODENOSCOPY);  Surgeon: Tavo Kwok MD;  Location: [facility] (4TH FLR);  Service: Endoscopy;  Laterality: N/A;  Questionable history of delayed gastric emptying longstanding diabetes now on eating pre transplant workup for history of nausea vomiting which seems to have improved with dialysis also chronic diarrhea and history of anemia pre transplant workup for    FISTULOGRAM N/A 8/11/2021    Procedure: Fistulogram;  Surgeon: Roberto Ryan MD;  Location: [facility] CATH LAB;  Service: Cardiology;  Laterality: N/A;    PERCUTANEOUS TRANSLUMINAL ANGIOPLASTY OF ARTERIOVENOUS FISTULA N/A 8/11/2021    Procedure: PTA, AV FISTULA;  Surgeon: Roberto Ryan MD;  Location: [facility] CATH LAB;  Service: Cardiology;  Laterality: N/A;    REMOVAL IMPLANT, POSTERIOR SEGMENT, INTRAOCULAR Right 2/1/2022    Procedure: REMOVAL IMPLANT, POSTERIOR SEGMENT, INTRAOCULAR;  Surgeon: Maximilian Montaño  MD;  [facility];  Service: Ophthalmology;  Laterality: Right;    REPAIR OF RETINAL DETACHMENT WITH VITRECTOMY Right 1/25/2022    Procedure: REPAIR, RETINAL DETACHMENT, WITH VITRECTOMY, MEMBRANE PEEL, LASER, INJECTION OF GAS VS OIL;  Surgeon: Maximilian Montaño MD;  Location: [facility] OR 1ST FLR;  Service: Ophthalmology;  Laterality: Right;    REVISION OF ARTERIOVENOUS FISTULA Left 12/10/2021    Procedure: REVISION, AV FISTULA with BVT;  Surgeon: Roberto Ryan MD;  Location: [facility] OR 2ND FLR;  Service: Peripheral Vascular;  Laterality: Left;    VITRECTOMY BY PARS PLANA APPROACH Right 2/1/2022    Procedure: VITRECTOMY, PARS PLANA APPROACH;  Surgeon: Maximilian Montaño MD;  Location: [facility] OR [address] [place] [address];  Service: Ophthalmology;  Laterality: Right;       Review of patient's allergies indicates:  No Known Allergies    Current Facility-Administered Medications on File Prior to Encounter   Medication    0.9%  NaCl infusion     Current Outpatient Medications on File Prior to Encounter   Medication Sig    aspirin (ECOTRIN) 81 MG EC tablet Take 81 mg by mouth once daily.    blood sugar diagnostic Strp To check BG 4 - 6 times daily, to use with insurance preferred meter    calcitRIOL (ROCALTROL) 0.5 MCG Cap Take 0.5 mcg by mouth once daily.    carvediloL (COREG) 25 MG tablet Take 25 mg by mouth 2 (two) times daily with meals.    flash glucose scanning reader (FREESTYLE MARCY 14 DAY READER) Misc 1 each by Misc.(Non-Drug; Combo Route) route once daily.    flash glucose sensor (FREESTYLE MARCY 14 DAY SENSOR) Kit 6 kits by Misc.(Non-Drug; Combo Route) route once daily.    fluticasone propionate (FLONASE) 50 mcg/actuation nasal spray 1 spray by Each Nostril route daily as needed for Rhinitis or Allergies.    hydrALAZINE (APRESOLINE) 25 MG tablet Take 2 tablets (50 mg total) by mouth every 8 (eight) hours.    insulin detemir U-100 (LEVEMIR FLEXTOUCH) 100 unit/mL (3 mL) SubQ InPn pen 10 units  "subcutaneously in the morning and 12 units sq at night (Patient taking differently: Inject 10 units subcutaneously in the morning and 12 units sq at night)    insulin lispro (HUMALOG KWIKPEN INSULIN) 100 unit/mL pen Inject 10 units into skin the skin 3 three times daily with meals    lancets Misc To check BG 4 - 6 times daily, to use with insurance preferred meter    multivit-min/folic acid/biotin (HAIR,SKIN AND NAILS,FA-BIOTIN, ORAL) Take 1 tablet by mouth once daily.    NIFEdipine (PROCARDIA-XL) 60 MG (OSM) 24 hr tablet Take 60 mg by mouth 2 (two) times a day.    pen needle, diabetic 32 gauge x 5/32" Ndle For three times daily injection    sevelamer carbonate (RENVELA) 800 mg Tab TAKE 1 TABLET BY MOUTH THREE TIMES DAILY WITH MEALS    traZODone (DESYREL) 50 MG tablet Take 1 tablet (50 mg total) by mouth nightly as needed for Insomnia.    valsartan (DIOVAN) 160 MG tablet Take 1 tablet (160 mg total) by mouth once daily. Hold off on taking until told to restart by a physician.    venlafaxine (EFFEXOR XR) 37.5 MG 24 hr capsule Take 1 capsule (37.5 mg total) by mouth once daily.     Family History       Problem Relation (Age of Onset)    Diabetes Brother    Heart disease Father    Hypertension Mother          Tobacco Use    Smoking status: Never Smoker    Smokeless tobacco: Never Used   Substance and Sexual Activity    Alcohol use: No    Drug use: No    Sexual activity: Not Currently     Partners: Male     Comment: monogamous     Review of Systems   Constitutional:  Negative for activity change, chills, diaphoresis, fatigue and fever.   HENT:  Negative for congestion, facial swelling, rhinorrhea and sore throat.    Eyes:  Negative for photophobia, itching and visual disturbance.   Respiratory:  Negative for cough, chest tightness, shortness of breath and wheezing.    Cardiovascular:  Negative for chest pain, palpitations and leg swelling.   Gastrointestinal:  Negative for abdominal distention, abdominal " pain, blood in stool, constipation, diarrhea, nausea and vomiting.   Genitourinary:  Positive for decreased urine volume. Negative for difficulty urinating, dysuria, frequency, hematuria and urgency.   Musculoskeletal:  Negative for arthralgias, back pain and neck stiffness.   Neurological:  Negative for dizziness, tremors, seizures, syncope, weakness, light-headedness, numbness and headaches.   Psychiatric/Behavioral:  Negative for agitation, confusion and hallucinations.    Objective:     Vital Signs (Most Recent):  Temp: 97.6 °F (36.4 °C) (05/17/22 1420)  Pulse: 89 (05/17/22 1500)  Resp: 18 (05/17/22 1420)  BP: (!) 152/82 (05/17/22 1500)  SpO2: 98 % (05/17/22 0941)   Vital Signs (24h Range):  Temp:  [97.6 °F (36.4 °C)-97.9 °F (36.6 °C)] 97.6 °F (36.4 °C)  Pulse:  [76-89] 89  Resp:  [16-18] 18  SpO2:  [98 %] 98 %  BP: (109-152)/(62-82) 152/82        There is no height or weight on file to calculate BMI.    Physical Exam  Vitals and nursing note reviewed.   Constitutional:       General: She is not in acute distress.     Appearance: She is well-developed. She is not diaphoretic.   HENT:      Head: Normocephalic and atraumatic.      Right Ear: External ear normal.      Left Ear: External ear normal.      Nose: Nose normal. No congestion.      Mouth/Throat:      Pharynx: Oropharynx is clear.   Eyes:      General: No scleral icterus.     Extraocular Movements: Extraocular movements intact.   Cardiovascular:      Rate and Rhythm: Normal rate and regular rhythm.      Pulses: Normal pulses.      Heart sounds: Normal heart sounds. No murmur heard.  Pulmonary:      Effort: Pulmonary effort is normal. No respiratory distress.      Breath sounds: Normal breath sounds. No wheezing or rales.   Abdominal:      General: Bowel sounds are normal. There is no distension.      Palpations: Abdomen is soft.      Tenderness: There is no abdominal tenderness. There is no guarding or rebound.   Musculoskeletal:      Cervical back: Normal  range of motion.      Right lower leg: No edema.      Left lower leg: No edema.   Skin:     General: Skin is warm and dry.      Capillary Refill: Capillary refill takes less than 2 seconds.   Neurological:      General: No focal deficit present.      Mental Status: She is alert and oriented to person, place, and time. Mental status is at baseline.   Psychiatric:         Mood and Affect: Mood normal.         Behavior: Behavior normal.         Thought Content: Thought content normal.           Significant Labs: All pertinent labs within the past 24 hours have been reviewed.  CBC:   Recent Labs   Lab 05/17/22  0955 05/17/22  1000 05/17/22  1320   WBC 5.92  --   --    HGB 9.9*  --   --    HCT 30.4* 32* 33*     --   --      CMP:   Recent Labs   Lab 05/17/22  1030 05/17/22  1327    130*   K 5.9* 7.7*   CL 96 93*   CO2 26 22*   * 547*   BUN 60* 62*   CREATININE 7.2* 7.8*   CALCIUM 7.9* 8.0*   ANIONGAP 15 15   EGFRNONAA 7.1* 6.5*       Significant Imaging: I have reviewed all pertinent imaging results/findings within the past 24 hours.    Assessment/Plan:     * Hyperkalemia  -K 5.9 on admission now 7.7  -HD today  -shifted in ED  -monitor w/ BMP daily      Hyperglycemia due to type 1 diabetes mellitus  -  -recheck BMP s/p HD  -continue hos insulin regimen: see DM        End stage renal failure on dialysis  -home HD TTS  -urgent HD today for hyperkalemia  -nephrology following, recs appreciated  -renally dose medications  -renal diet    Type 1 diabetes mellitus with end-stage renal disease (ESRD)  -last A1c 8.3  -, monitoring  -home regimen held: lispro 10 units qam 12 units qhs, aspart 10 units tidwm  -hos regimen: detemir 12 units bid, aspart 5 units tidwm, mod dose SSI  -diabetic diet  -q4h accuchecks        VTE Risk Mitigation (From admission, onward)         Ordered     IP VTE LOW RISK PATIENT  Once         05/17/22 1411     Place sequential compression device  Until discontinued          05/17/22 1411     Place sequential compression device  Until discontinued         05/17/22 1409                   Bria Perales PA-C  Department of Hospital Medicine   [facility] - [facility] Dept

## 2022-05-17 NOTE — SUBJECTIVE & OBJECTIVE
Past Medical History:   Diagnosis Date    Anemia     Chronic hypertension with exacerbation during pregnancy in second trimester 11/6/2020    Current regimen (11/6/20):  - Carvedilol 12.5 mg BID - Nifedipine 30 mg daily Baseline CKD + proteinuria    Chronic kidney disease     Depression     Diabetes mellitus     Diabetic retinopathy     Diarrhea     Encounter for blood transfusion     Gastroparesis     Glaucoma     Hepatomegaly 4/29/2021    Hx of psychiatric care     Hyperlipidemia     Hypertension     Nephrotic syndrome     Nephrotic syndrome 3/4/2021    Palpitations     Poor fetal growth affecting management of mother in second trimester 10/15/2020    Restrictive lung disease     Retinal detachment     Severe pre-eclampsia in second trimester 11/6/2020       Past Surgical History:   Procedure Laterality Date    AV FISTULA PLACEMENT Left 4/7/2021    Procedure: CREATION, AV FISTULA;  Surgeon: Roberto Ryan MD;  Location: [facility] OR 2ND FLR;  Service: Peripheral Vascular;  Laterality: Left;    COLONOSCOPY N/A 3/16/2022    Procedure: COLONOSCOPY;  Surgeon: Tavo Kwok MD;  Location: [facility] (4TH FLR);  Service: Endoscopy;  Laterality: N/A;  Questionable history of delayed gastric emptying longstanding diabetes now on eating pre transplant workup for history of nausea vomiting which seems to have improved with dialysis also chronic diarrhea and history of anemia pre transplant workup for kidney transplant. 3    ESOPHAGOGASTRODUODENOSCOPY N/A 3/16/2022    Procedure: EGD (ESOPHAGOGASTRODUODENOSCOPY);  Surgeon: Tavo Kwok MD;  Location: [facility] (4TH FLR);  Service: Endoscopy;  Laterality: N/A;  Questionable history of delayed gastric emptying longstanding diabetes now on eating pre transplant workup for history of nausea vomiting which seems to have improved with dialysis also chronic diarrhea and history of anemia pre transplant workup for    FISTULOGRAM N/A 8/11/2021    Procedure: Fistulogram;   Surgeon: Roberto Ryan MD;  Location: [facility] CATH LAB;  Service: Cardiology;  Laterality: N/A;    PERCUTANEOUS TRANSLUMINAL ANGIOPLASTY OF ARTERIOVENOUS FISTULA N/A 8/11/2021    Procedure: PTA, AV FISTULA;  Surgeon: Roberto Ryan MD;  Location: [facility] CATH LAB;  Service: Cardiology;  Laterality: N/A;    REMOVAL IMPLANT, POSTERIOR SEGMENT, INTRAOCULAR Right 2/1/2022    Procedure: REMOVAL IMPLANT, POSTERIOR SEGMENT, INTRAOCULAR;  Surgeon: Maximilian Montaño MD;  [facility];  Service: Ophthalmology;  Laterality: Right;    REPAIR OF RETINAL DETACHMENT WITH VITRECTOMY Right 1/25/2022    Procedure: REPAIR, RETINAL DETACHMENT, WITH VITRECTOMY, MEMBRANE PEEL, LASER, INJECTION OF GAS VS OIL;  Surgeon: Maximilian Montaño MD;  Location: [facility] OR 1ST FLR;  Service: Ophthalmology;  Laterality: Right;    REVISION OF ARTERIOVENOUS FISTULA Left 12/10/2021    Procedure: REVISION, AV FISTULA with BVT;  Surgeon: Roberto Ryan MD;  Location: [facility] OR 2ND FLR;  Service: Peripheral Vascular;  Laterality: Left;    VITRECTOMY BY PARS PLANA APPROACH Right 2/1/2022    Procedure: VITRECTOMY, PARS PLANA APPROACH;  Surgeon: Maximilian Montaño MD;  Location: [facility] OR [address] [place] [address];  Service: Ophthalmology;  Laterality: Right;       Review of patient's allergies indicates:  No Known Allergies  Current Facility-Administered Medications   Medication Frequency    0.9%  NaCl infusion Once    acetaminophen tablet 1,000 mg Q8H PRN    acetaminophen tablet 650 mg Q4H PRN    albuterol-ipratropium 2.5 mg-0.5 mg/3 mL nebulizer solution 3 mL Q4H PRN    aluminum-magnesium hydroxide-simethicone 200-200-20 mg/5 mL suspension 30 mL QID PRN    bisacodyL suppository 10 mg Daily PRN    dextrose 10% bolus 125 mL PRN    dextrose 10% bolus 250 mL PRN    dextrose 10% bolus 250 mL PRN    glucagon (human recombinant) injection 1 mg PRN    glucose chewable tablet 16 g PRN    glucose chewable tablet 24 g PRN    insulin aspart U-100 pen 0-5  "Units QID (AC + HS) PRN    insulin regular injection 5 Units ED 1 Time    melatonin tablet 6 mg Nightly PRN    naloxone 0.4 mg/mL injection 0.02 mg PRN    ondansetron disintegrating tablet 8 mg Q8H PRN    prochlorperazine injection Soln 5 mg Q6H PRN    simethicone chewable tablet 80 mg QID PRN    sodium chloride 0.9% flush 10 mL PRN    sodium chloride 0.9% flush 5 mL PRN    sodium zirconium cyclosilicate packet 10 g ED 1 Time     Current Outpatient Medications   Medication    aspirin (ECOTRIN) 81 MG EC tablet    blood sugar diagnostic Strp    calcitRIOL (ROCALTROL) 0.5 MCG Cap    carvediloL (COREG) 25 MG tablet    flash glucose scanning reader (FREESTYLE MARCY 14 DAY READER) Misc    flash glucose sensor (FREESTYLE MARCY 14 DAY SENSOR) Kit    fluticasone propionate (FLONASE) 50 mcg/actuation nasal spray    hydrALAZINE (APRESOLINE) 25 MG tablet    insulin detemir U-100 (LEVEMIR FLEXTOUCH) 100 unit/mL (3 mL) SubQ InPn pen    insulin lispro (HUMALOG KWIKPEN INSULIN) 100 unit/mL pen    lancets Misc    multivit-min/folic acid/biotin (HAIR,SKIN AND NAILS,FA-BIOTIN, ORAL)    NIFEdipine (PROCARDIA-XL) 60 MG (OSM) 24 hr tablet    pen needle, diabetic 32 gauge x 5/32" Ndle    sevelamer carbonate (RENVELA) 800 mg Tab    traZODone (DESYREL) 50 MG tablet    valsartan (DIOVAN) 160 MG tablet    venlafaxine (EFFEXOR XR) 37.5 MG 24 hr capsule     Facility-Administered Medications Ordered in Other Encounters   Medication Frequency    0.9%  NaCl infusion Continuous     Family History       Problem Relation (Age of Onset)    Diabetes Brother    Heart disease Father    Hypertension Mother          Tobacco Use    Smoking status: Never Smoker    Smokeless tobacco: Never Used   Substance and Sexual Activity    Alcohol use: No    Drug use: No    Sexual activity: Not Currently     Partners: Male     Comment: monogamous     Review of Systems   Constitutional: Negative.    HENT: Negative.     Eyes: Negative.    Respiratory: Negative.   "   Cardiovascular: Negative.    Gastrointestinal: Negative.    Endocrine: Negative.    Genitourinary:  Positive for decreased urine volume.   Musculoskeletal: Negative.    Neurological: Negative.    Hematological: Negative.    Psychiatric/Behavioral: Negative.     Objective:     Vital Signs (Most Recent):  Temp: 97.9 °F (36.6 °C) (05/17/22 1056)  Pulse: 78 (05/17/22 1341)  Resp: 18 (05/17/22 1341)  BP: 109/62 (05/17/22 0928)  SpO2: 98 % (05/17/22 0941)  O2 Device (Oxygen Therapy): room air (05/17/22 1344) Vital Signs (24h Range):  Temp:  [97.9 °F (36.6 °C)] 97.9 °F (36.6 °C)  Pulse:  [76-78] 78  Resp:  [16-18] 18  SpO2:  [98 %] 98 %  BP: (109)/(62) 109/62        There is no height or weight on file to calculate BMI.  There is no height or weight on file to calculate BSA.    No intake/output data recorded.    Physical Exam  Vitals and nursing note reviewed.   HENT:      Head: Normocephalic.      Mouth/Throat:      Pharynx: Oropharynx is clear.   Eyes:      Conjunctiva/sclera: Conjunctivae normal.   Cardiovascular:      Rate and Rhythm: Normal rate.      Pulses: Normal pulses.   Pulmonary:      Effort: Pulmonary effort is normal.   Abdominal:      Palpations: Abdomen is soft.   Musculoskeletal:      Cervical back: Neck supple.      Right lower leg: No edema.      Left lower leg: No edema.   Neurological:      Mental Status: She is alert and oriented to person, place, and time.   Psychiatric:         Mood and Affect: Mood normal.       Significant Labs:  CBC:   Recent Labs   Lab 05/17/22  0955 05/17/22  1000 05/17/22  1320   WBC 5.92  --   --    RBC 3.40*  --   --    HGB 9.9*  --   --    HCT 30.4*   < > 33*     --   --    MCV 89  --   --    MCH 29.1  --   --    MCHC 32.6  --   --     < > = values in this interval not displayed.     CMP:   Recent Labs   Lab 05/17/22  1327   *   CALCIUM 8.0*   *   K 7.7*   CO2 22*   CL 93*   BUN 62*   CREATININE 7.8*     All labs within the past 24 hours have been  reviewed.

## 2022-05-17 NOTE — ASSESSMENT & PLAN NOTE
-home HD TTS  -urgent HD today for hyperkalemia  -nephrology following, recs appreciated  -renally dose medications  -renal diet

## 2022-05-17 NOTE — HPI
"Isabela Read is a 27 year old female with history of ESRD (TTHS), HTN, DM, who presents to the ED for evaluation for fistula bleeding. Pt reports they let her use the bathroom at dilaysis unit and needle came out. States there was "a lot of blood", with bleeding controlled with direct pressure. There is no bleeding at this time. Was told by dialysis unit to come in for evaluation of CBC to ensure she didn't need a blood transfusions. Subsequently they were not able to dialyze her. Denies SOB,chest pain, N/V/D, fever, cough, or chills. Last dialysis per patient on Saturday 5/14. K 7.7 today after being shifted in ED.   "

## 2022-05-18 LAB
ANION GAP SERPL CALC-SCNC: 13 MMOL/L (ref 8–16)
ANION GAP SERPL CALC-SCNC: 14 MMOL/L (ref 8–16)
ANION GAP SERPL CALC-SCNC: 14 MMOL/L (ref 8–16)
BUN SERPL-MCNC: 34 MG/DL (ref 6–20)
BUN SERPL-MCNC: 36 MG/DL (ref 6–20)
BUN SERPL-MCNC: 41 MG/DL (ref 6–20)
BUN SERPL-MCNC: 80 MG/DL (ref 6–30)
CALCIUM SERPL-MCNC: 8.2 MG/DL (ref 8.7–10.5)
CALCIUM SERPL-MCNC: 8.2 MG/DL (ref 8.7–10.5)
CALCIUM SERPL-MCNC: 8.3 MG/DL (ref 8.7–10.5)
CHLORIDE SERPL-SCNC: 100 MMOL/L (ref 95–110)
CHLORIDE SERPL-SCNC: 101 MMOL/L (ref 95–110)
CHLORIDE SERPL-SCNC: 102 MMOL/L (ref 95–110)
CHLORIDE SERPL-SCNC: 95 MMOL/L (ref 95–110)
CO2 SERPL-SCNC: 17 MMOL/L (ref 23–29)
CO2 SERPL-SCNC: 18 MMOL/L (ref 23–29)
CO2 SERPL-SCNC: 18 MMOL/L (ref 23–29)
CREAT SERPL-MCNC: 5.2 MG/DL (ref 0.5–1.4)
CREAT SERPL-MCNC: 5.5 MG/DL (ref 0.5–1.4)
CREAT SERPL-MCNC: 6.4 MG/DL (ref 0.5–1.4)
CREAT SERPL-MCNC: 8.7 MG/DL (ref 0.5–1.4)
EST. GFR  (AFRICAN AMERICAN): 11.4 ML/MIN/1.73 M^2
EST. GFR  (AFRICAN AMERICAN): 12.2 ML/MIN/1.73 M^2
EST. GFR  (AFRICAN AMERICAN): 9.5 ML/MIN/1.73 M^2
EST. GFR  (NON AFRICAN AMERICAN): 10.5 ML/MIN/1.73 M^2
EST. GFR  (NON AFRICAN AMERICAN): 8.2 ML/MIN/1.73 M^2
EST. GFR  (NON AFRICAN AMERICAN): 9.9 ML/MIN/1.73 M^2
GLUCOSE SERPL-MCNC: 417 MG/DL (ref 70–110)
GLUCOSE SERPL-MCNC: 453 MG/DL (ref 70–110)
GLUCOSE SERPL-MCNC: 479 MG/DL (ref 70–110)
GLUCOSE SERPL-MCNC: 528 MG/DL (ref 70–110)
HAV IGM SERPL QL IA: NEGATIVE
HBV CORE IGM SERPL QL IA: NEGATIVE
HBV SURFACE AG SERPL QL IA: NEGATIVE
HCT VFR BLD CALC: 32 %PCV (ref 36–54)
HCV AB SERPL QL IA: NEGATIVE
PHOSPHATE SERPL-MCNC: 5.5 MG/DL (ref 2.7–4.5)
POC IONIZED CALCIUM: 1 MMOL/L (ref 1.06–1.42)
POC TCO2 (MEASURED): 26 MMOL/L (ref 23–29)
POCT GLUCOSE: 337 MG/DL (ref 70–110)
POCT GLUCOSE: 340 MG/DL (ref 70–110)
POCT GLUCOSE: 340 MG/DL (ref 70–110)
POCT GLUCOSE: 370 MG/DL (ref 70–110)
POCT GLUCOSE: 389 MG/DL (ref 70–110)
POCT GLUCOSE: 391 MG/DL (ref 70–110)
POCT GLUCOSE: 476 MG/DL (ref 70–110)
POTASSIUM BLD-SCNC: 7.9 MMOL/L (ref 3.5–5.1)
POTASSIUM SERPL-SCNC: 5 MMOL/L (ref 3.5–5.1)
POTASSIUM SERPL-SCNC: 5.5 MMOL/L (ref 3.5–5.1)
POTASSIUM SERPL-SCNC: 6 MMOL/L (ref 3.5–5.1)
SAMPLE: ABNORMAL
SODIUM BLD-SCNC: 127 MMOL/L (ref 136–145)
SODIUM SERPL-SCNC: 131 MMOL/L (ref 136–145)
SODIUM SERPL-SCNC: 132 MMOL/L (ref 136–145)
SODIUM SERPL-SCNC: 134 MMOL/L (ref 136–145)

## 2022-05-18 PROCEDURE — 25000003 PHARM REV CODE 250: Mod: NTX | Performed by: NURSE PRACTITIONER

## 2022-05-18 PROCEDURE — 25000003 PHARM REV CODE 250: Mod: NTX | Performed by: PHYSICIAN ASSISTANT

## 2022-05-18 PROCEDURE — 99226 PR SUBSEQUENT OBSERVATION CARE,LEVEL III: ICD-10-PCS | Mod: NTX,,, | Performed by: PHYSICIAN ASSISTANT

## 2022-05-18 PROCEDURE — 96365 THER/PROPH/DIAG IV INF INIT: CPT

## 2022-05-18 PROCEDURE — 25000003 PHARM REV CODE 250: Mod: NTX

## 2022-05-18 PROCEDURE — G0378 HOSPITAL OBSERVATION PER HR: HCPCS | Mod: NTX

## 2022-05-18 PROCEDURE — 96366 THER/PROPH/DIAG IV INF ADDON: CPT | Mod: NTX

## 2022-05-18 PROCEDURE — 96372 THER/PROPH/DIAG INJ SC/IM: CPT | Performed by: PHYSICIAN ASSISTANT

## 2022-05-18 PROCEDURE — 99226 PR SUBSEQUENT OBSERVATION CARE,LEVEL III: CPT | Mod: NTX,,, | Performed by: PHYSICIAN ASSISTANT

## 2022-05-18 PROCEDURE — 84100 ASSAY OF PHOSPHORUS: CPT | Mod: NTX | Performed by: PHYSICIAN ASSISTANT

## 2022-05-18 PROCEDURE — 80048 BASIC METABOLIC PNL TOTAL CA: CPT | Mod: NTX | Performed by: PHYSICIAN ASSISTANT

## 2022-05-18 PROCEDURE — 63600175 PHARM REV CODE 636 W HCPCS: Mod: NTX | Performed by: PHYSICIAN ASSISTANT

## 2022-05-18 PROCEDURE — 96367 TX/PROPH/DG ADDL SEQ IV INF: CPT | Mod: NTX

## 2022-05-18 PROCEDURE — 80074 ACUTE HEPATITIS PANEL: CPT | Mod: NTX | Performed by: INTERNAL MEDICINE

## 2022-05-18 PROCEDURE — 36415 COLL VENOUS BLD VENIPUNCTURE: CPT | Mod: NTX | Performed by: PHYSICIAN ASSISTANT

## 2022-05-18 PROCEDURE — 96367 TX/PROPH/DG ADDL SEQ IV INF: CPT

## 2022-05-18 RX ORDER — IBUPROFEN 200 MG
24 TABLET ORAL
Status: DISCONTINUED | OUTPATIENT
Start: 2022-05-18 | End: 2022-05-19

## 2022-05-18 RX ORDER — SEVELAMER CARBONATE 800 MG/1
800 TABLET, FILM COATED ORAL
Status: DISCONTINUED | OUTPATIENT
Start: 2022-05-18 | End: 2022-05-19

## 2022-05-18 RX ORDER — TRAZODONE HYDROCHLORIDE 50 MG/1
50 TABLET ORAL NIGHTLY PRN
Status: DISCONTINUED | OUTPATIENT
Start: 2022-05-18 | End: 2022-05-19 | Stop reason: HOSPADM

## 2022-05-18 RX ORDER — INSULIN ASPART 100 [IU]/ML
10 INJECTION, SOLUTION INTRAVENOUS; SUBCUTANEOUS
Status: DISCONTINUED | OUTPATIENT
Start: 2022-05-18 | End: 2022-05-18

## 2022-05-18 RX ORDER — GLUCAGON 1 MG
1 KIT INJECTION
Status: DISCONTINUED | OUTPATIENT
Start: 2022-05-18 | End: 2022-05-19

## 2022-05-18 RX ORDER — ASPIRIN 81 MG/1
81 TABLET ORAL DAILY
Status: DISCONTINUED | OUTPATIENT
Start: 2022-05-19 | End: 2022-05-19 | Stop reason: HOSPADM

## 2022-05-18 RX ORDER — CARVEDILOL 25 MG/1
25 TABLET ORAL 2 TIMES DAILY WITH MEALS
Status: DISCONTINUED | OUTPATIENT
Start: 2022-05-18 | End: 2022-05-19 | Stop reason: HOSPADM

## 2022-05-18 RX ORDER — CALCITRIOL 0.25 UG/1
0.5 CAPSULE ORAL DAILY
Status: DISCONTINUED | OUTPATIENT
Start: 2022-05-19 | End: 2022-05-19 | Stop reason: HOSPADM

## 2022-05-18 RX ORDER — NIFEDIPINE 30 MG/1
60 TABLET, EXTENDED RELEASE ORAL 2 TIMES DAILY
Status: DISCONTINUED | OUTPATIENT
Start: 2022-05-18 | End: 2022-05-19 | Stop reason: HOSPADM

## 2022-05-18 RX ORDER — IBUPROFEN 200 MG
16 TABLET ORAL
Status: DISCONTINUED | OUTPATIENT
Start: 2022-05-18 | End: 2022-05-19

## 2022-05-18 RX ORDER — INSULIN ASPART 100 [IU]/ML
5 INJECTION, SOLUTION INTRAVENOUS; SUBCUTANEOUS ONCE
Status: COMPLETED | OUTPATIENT
Start: 2022-05-18 | End: 2022-05-18

## 2022-05-18 RX ORDER — VENLAFAXINE HYDROCHLORIDE 37.5 MG/1
37.5 CAPSULE, EXTENDED RELEASE ORAL DAILY
Status: DISCONTINUED | OUTPATIENT
Start: 2022-05-19 | End: 2022-05-19 | Stop reason: HOSPADM

## 2022-05-18 RX ORDER — INSULIN ASPART 100 [IU]/ML
0-10 INJECTION, SOLUTION INTRAVENOUS; SUBCUTANEOUS
Status: DISCONTINUED | OUTPATIENT
Start: 2022-05-18 | End: 2022-05-19

## 2022-05-18 RX ORDER — SODIUM CHLORIDE 9 MG/ML
INJECTION, SOLUTION INTRAVENOUS ONCE
Status: DISCONTINUED | OUTPATIENT
Start: 2022-05-19 | End: 2022-05-19 | Stop reason: HOSPADM

## 2022-05-18 RX ORDER — INSULIN ASPART 100 [IU]/ML
4-8 INJECTION, SOLUTION INTRAVENOUS; SUBCUTANEOUS
Status: DISCONTINUED | OUTPATIENT
Start: 2022-05-19 | End: 2022-05-19

## 2022-05-18 RX ADMIN — INSULIN DETEMIR 12 UNITS: 100 INJECTION, SOLUTION SUBCUTANEOUS at 08:05

## 2022-05-18 RX ADMIN — NIFEDIPINE 60 MG: 60 TABLET, FILM COATED, EXTENDED RELEASE ORAL at 09:05

## 2022-05-18 RX ADMIN — NIFEDIPINE 60 MG: 60 TABLET, FILM COATED, EXTENDED RELEASE ORAL at 04:05

## 2022-05-18 RX ADMIN — HYDRALAZINE HYDROCHLORIDE 25 MG: 25 TABLET, FILM COATED ORAL at 09:05

## 2022-05-18 RX ADMIN — HYDRALAZINE HYDROCHLORIDE 25 MG: 25 TABLET, FILM COATED ORAL at 06:05

## 2022-05-18 RX ADMIN — HYDRALAZINE HYDROCHLORIDE 25 MG: 25 TABLET, FILM COATED ORAL at 12:05

## 2022-05-18 RX ADMIN — INSULIN ASPART 10 UNITS: 100 INJECTION, SOLUTION INTRAVENOUS; SUBCUTANEOUS at 11:05

## 2022-05-18 RX ADMIN — INSULIN HUMAN 1 UNITS/HR: 1 INJECTION, SOLUTION INTRAVENOUS at 10:05

## 2022-05-18 RX ADMIN — CARVEDILOL 25 MG: 25 TABLET, FILM COATED ORAL at 12:05

## 2022-05-18 RX ADMIN — SEVELAMER CARBONATE 800 MG: 800 TABLET, FILM COATED ORAL at 04:05

## 2022-05-18 RX ADMIN — CARVEDILOL 25 MG: 25 TABLET, FILM COATED ORAL at 05:05

## 2022-05-18 RX ADMIN — HYDRALAZINE HYDROCHLORIDE 25 MG: 25 TABLET, FILM COATED ORAL at 01:05

## 2022-05-18 RX ADMIN — INSULIN ASPART 10 UNITS: 100 INJECTION, SOLUTION INTRAVENOUS; SUBCUTANEOUS at 04:05

## 2022-05-18 RX ADMIN — CARVEDILOL 25 MG: 25 TABLET, FILM COATED ORAL at 08:05

## 2022-05-18 RX ADMIN — INSULIN ASPART 5 UNITS: 100 INJECTION, SOLUTION INTRAVENOUS; SUBCUTANEOUS at 10:05

## 2022-05-18 RX ADMIN — INSULIN ASPART 5 UNITS: 100 INJECTION, SOLUTION INTRAVENOUS; SUBCUTANEOUS at 07:05

## 2022-05-18 NOTE — NURSING
Pt requested to continue her home schedule as close as possible.    10 U Levemir in AM 0800  Carvedilol 0800   Hydralazine 0800  Nifedipine 0800  Calcitrol 0800    Hydralazine 1200    Caravatolol 2000   Hydrolazine 2000  Nipphidipine 2000  Asprin 2000  Rovastatin 2000    10 U Novolog before meals  Sevelamer TID after meals    Provider notified.

## 2022-05-18 NOTE — SUBJECTIVE & OBJECTIVE
Interval History: NAEON. Pt seen and evaluated at bedside this am. BG >400 however Pt asymptomatic. Increased insulin dose and consulted endocrinology for assistance.     Review of Systems   Constitutional:  Negative for activity change, chills, diaphoresis, fatigue and fever.   HENT:  Negative for congestion, facial swelling, rhinorrhea and sore throat.    Eyes:  Negative for photophobia, itching and visual disturbance.   Respiratory:  Negative for cough, chest tightness, shortness of breath and wheezing.    Cardiovascular:  Negative for chest pain, palpitations and leg swelling.   Gastrointestinal:  Negative for abdominal distention, abdominal pain, blood in stool, constipation, diarrhea, nausea and vomiting.   Genitourinary:  Positive for decreased urine volume (on HD). Negative for difficulty urinating, dysuria, frequency, hematuria and urgency.   Musculoskeletal:  Negative for arthralgias, back pain and neck stiffness.   Neurological:  Negative for dizziness, tremors, seizures, syncope, weakness, light-headedness, numbness and headaches.   Psychiatric/Behavioral:  Negative for agitation, confusion and hallucinations.    Objective:     Vital Signs (Most Recent):  Temp: 98.7 °F (37.1 °C) (05/18/22 1523)  Pulse: 93 (05/18/22 1523)  Resp: 16 (05/18/22 1523)  BP: (!) 143/83 (05/18/22 1523)  SpO2: 97 % (05/18/22 1546)   Vital Signs (24h Range):  Temp:  [98.1 °F (36.7 °C)-98.7 °F (37.1 °C)] 98.7 °F (37.1 °C)  Pulse:  [] 93  Resp:  [16-19] 16  SpO2:  [97 %-100 %] 97 %  BP: (143-203)/(77-98) 143/83     Weight: 61.6 kg (135 lb 12.9 oz)  Body mass index is 23.31 kg/m².    Intake/Output Summary (Last 24 hours) at 5/18/2022 1703  Last data filed at 5/17/2022 1815  Gross per 24 hour   Intake --   Output 2900 ml   Net -2900 ml      Physical Exam  Vitals and nursing note reviewed.   Constitutional:       General: She is not in acute distress.     Appearance: She is well-developed. She is not diaphoretic.   HENT:       Head: Normocephalic and atraumatic.      Right Ear: External ear normal.      Left Ear: External ear normal.      Nose: Nose normal. No congestion.      Mouth/Throat:      Pharynx: Oropharynx is clear.   Eyes:      General: No scleral icterus.     Extraocular Movements: Extraocular movements intact.   Cardiovascular:      Rate and Rhythm: Normal rate and regular rhythm.      Pulses: Normal pulses.      Heart sounds: Normal heart sounds. No murmur heard.  Pulmonary:      Effort: Pulmonary effort is normal. No respiratory distress.      Breath sounds: Normal breath sounds. No wheezing or rales.   Abdominal:      General: Bowel sounds are normal. There is no distension.      Palpations: Abdomen is soft.      Tenderness: There is no abdominal tenderness. There is no guarding or rebound.   Musculoskeletal:      Cervical back: Normal range of motion.      Right lower leg: No edema.      Left lower leg: No edema.   Skin:     General: Skin is warm and dry.      Capillary Refill: Capillary refill takes less than 2 seconds.   Neurological:      General: No focal deficit present.      Mental Status: She is alert and oriented to person, place, and time. Mental status is at baseline.   Psychiatric:         Mood and Affect: Mood normal.         Behavior: Behavior normal.         Thought Content: Thought content normal.       Significant Labs: All pertinent labs within the past 24 hours have been reviewed.    Significant Imaging: I have reviewed all pertinent imaging results/findings within the past 24 hours.

## 2022-05-18 NOTE — PHARMACY MED REC
"Admission Medication History     The home medication history was taken by Maylin Mccloud.    You may go to "Admission" then "Reconcile Home Medications" tabs to review and/or act upon these items.      The home medication list has been updated by the Pharmacy department.    Please read ALL comments highlighted in yellow.    Please address this information as you see fit.     Feel free to contact us if you have any questions or require assistance.      Medications listed below were obtained from: Analytic software- [employer] and Medical records ([facility] COMPLETED ON 5/9/22)   PTA Medications   Medication Sig    aspirin (ECOTRIN) 81 MG EC tablet Take 81 mg by mouth once daily.    blood sugar diagnostic Strp To check BG 4 - 6 times daily, to use with insurance preferred meter    calcitRIOL (ROCALTROL) 0.5 MCG Cap Take 0.5 mcg by mouth once daily.    carvediloL (COREG) 25 MG tablet Take 25 mg by mouth 2 (two) times daily with meals.    flash glucose scanning reader (FREESTYLE MARCY 14 DAY READER) Misc 1 each by Misc.(Non-Drug; Combo Route) route once daily.    flash glucose sensor (FREESTYLE MARCY 14 DAY SENSOR) Kit 6 kits by Misc.(Non-Drug; Combo Route) route once daily.    fluticasone propionate (FLONASE) 50 mcg/actuation nasal spray 1 spray by Each Nostril route daily as needed for Rhinitis or Allergies.    hydrALAZINE (APRESOLINE) 25 MG tablet Take 2 tablets (50 mg total) by mouth every 8 (eight) hours.    insulin detemir U-100 (LEVEMIR FLEXTOUCH) 100 unit/mL (3 mL) SubQ InPn pen 10 units subcutaneously in the morning and 12 units sq at night (Patient taking differently: Inject 10 units subcutaneously in the morning and 12 units sq at night)    insulin lispro (HUMALOG KWIKPEN INSULIN) 100 unit/mL pen Inject 10 units into skin the skin 3 three times daily with meals    lancets Misc To check BG 4 - 6 times daily, to use with insurance preferred meter    multivit-min/folic acid/biotin (HAIR,SKIN AND " "NAILS,FA-BIOTIN, ORAL) Take 1 tablet by mouth once daily.    NIFEdipine (PROCARDIA-XL) 60 MG (OSM) 24 hr tablet Take 60 mg by mouth 2 (two) times a day.    pen needle, diabetic 32 gauge x 5/32" Ndle For three times daily injection    sevelamer carbonate (RENVELA) 800 mg Tab TAKE 1 TABLET BY MOUTH THREE TIMES DAILY WITH MEALS    traZODone (DESYREL) 50 MG tablet Take 1 tablet (50 mg total) by mouth nightly as needed for Insomnia.    valsartan (DIOVAN) 160 MG tablet Take 1 tablet (160 mg total) by mouth once daily. Hold off on taking until told to restart by a physician.    venlafaxine (EFFEXOR XR) 37.5 MG 24 hr capsule Take 1 capsule (37.5 mg total) by mouth once daily.                             .        "

## 2022-05-18 NOTE — HPI
Reason for Consult: Management of T1DM, Hyperglycemia       Diabetes diagnosis year: at 8 years of age      Home Diabetes Medications:  ( Per Dr. Mayo with [facility])  Levemir 10 units in the am and 12 units q HS, Humalog 10 units with meals     How often checking glucose at home?  Freestyle Jayda    BG readings on regimen: variable per patient (100-300s)   Hypoglycemia on the regimen?  Yes, 1-2x weekly  Missed doses on regimen?  No     Diabetes Complications include:     Hyperglycemia, Hypoglycemia , gastroparesis, Diabetic nephropathy, and Diabetic chronic kidney disease          Complicating diabetes co morbidities:   HTN, ESRD on dialysis       HPI:   Patient is a 27 y.o. female with a diagnosis of ESRD ([facility]), HTN, DM, who presents for fistula sight bleeding.  bleeding controlled with direct pressure. Patient states she was told to come to the hospital for evaluation of her CBC to ensure she didn't need a transfusion. In ED, AFVSS. Hgb 9.9. CMP significant for Na 130, K 7.7, Cl 93, HCO3 22, AG 15, . Pt given IVF, K shifted, calcium gluconate. Nephrology consulted for urgent HD needs. Endocrinology consulted on 05/18/2022.    Lab Results   Component Value Date    HGBA1C 8.3 (H) 05/08/2022

## 2022-05-18 NOTE — CARE UPDATE
BG goal 140 - 180   Diet diabetic [facility]; 2000 Calorie; Renal, Cardiac (Low Na/Chol)       Consult acknowledged, chart reviewed, and orders placed. Expect full consult note to follow.     - Discontinue SQ MDI insulin regimen.   - Start  transition IV insulin infusion with stepdown parameters. Initial rate starting at 1 unit/hr.   - Reduce Novolog to 8 units/hr. Basal/Prandial physiologic matching.    - Moderate Dose SQ Insulin Correction Scale. Patient has history of insulin resistance as noted per chart review.     ** Please call Endocrine for any BG related issues **  ** Please notify Endocrine for any change and/or advance in diet**  Lab Results   Component Value Date    HGBA1C 8.3 (H) 05/08/2022       Discharge Planning:   TBD. Please notify endocrinology prior to discharge.

## 2022-05-18 NOTE — PROGRESS NOTES
"[facility] - Telemetry Stepdown ([place] [id number])  [facility] Medicine  Progress Note    Patient Name: Isabela Read  MRN: [id number]  Patient Class: OP- Observation   Admission Date: 5/17/2022  Length of Stay: 0 days  Attending Physician: Cornelius Palomares MD  Primary Care Provider: Tavo Bhatia MD        Subjective:     Principal Problem:Hyperkalemia        HPI:  Ms. Read is a 27 year old female with history of ESRD (TT), HTN, DM, who presents for fistula sight bleeding. Patient states that during dialysis today after the needle was placed the machine was not acting right. States that the dialysis center disconnected her from the machine with the needle still her her arm. States she went to use the restroom and the needle came out accidentally and she had some bleeding from the site. States it was "a lot of blood", with bleeding controlled with direct pressure. Patient states she was told to come to the hospital for evaluation of her CBC to ensure she didn't need a transfusion. Denies f/c, lightheadedness, headache, chest pain, SOB, n/v/d, abdominal pain, dysuria, leg swelling.    In ED, AFVSS. Hgb 9.9. CMP significant for Na 130, K 7.7, Cl 93, HCO3 22, AG 15, . Pt given IVF, K shifted, calcium gluconate. Nephrology consulted for urgent HD needs.      Overview/Hospital Course:  Pt placed in observation for fistula site bleeding that did not require repair. Pt was successfully dialyzed here. Nephrology followed for HD needs. Pt also has type 1 DM and was placed on a reduce home insulin dose at admission. BG >500 that initially improved but later continued to be elevated despite initiating home dose. Endocrinology consulted 5/18.      Interval History: NAEON. Pt seen and evaluated at bedside this am. BG >400 however Pt asymptomatic. Increased insulin dose and consulted endocrinology for assistance.     Review of Systems   Constitutional:  Negative for activity change, chills, diaphoresis, fatigue and " fever.   HENT:  Negative for congestion, facial swelling, rhinorrhea and sore throat.    Eyes:  Negative for photophobia, itching and visual disturbance.   Respiratory:  Negative for cough, chest tightness, shortness of breath and wheezing.    Cardiovascular:  Negative for chest pain, palpitations and leg swelling.   Gastrointestinal:  Negative for abdominal distention, abdominal pain, blood in stool, constipation, diarrhea, nausea and vomiting.   Genitourinary:  Positive for decreased urine volume (on HD). Negative for difficulty urinating, dysuria, frequency, hematuria and urgency.   Musculoskeletal:  Negative for arthralgias, back pain and neck stiffness.   Neurological:  Negative for dizziness, tremors, seizures, syncope, weakness, light-headedness, numbness and headaches.   Psychiatric/Behavioral:  Negative for agitation, confusion and hallucinations.    Objective:     Vital Signs (Most Recent):  Temp: 98.7 °F (37.1 °C) (05/18/22 1523)  Pulse: 93 (05/18/22 1523)  Resp: 16 (05/18/22 1523)  BP: (!) 143/83 (05/18/22 1523)  SpO2: 97 % (05/18/22 1546)   Vital Signs (24h Range):  Temp:  [98.1 °F (36.7 °C)-98.7 °F (37.1 °C)] 98.7 °F (37.1 °C)  Pulse:  [] 93  Resp:  [16-19] 16  SpO2:  [97 %-100 %] 97 %  BP: (143-203)/(77-98) 143/83     Weight: 61.6 kg (135 lb 12.9 oz)  Body mass index is 23.31 kg/m².    Intake/Output Summary (Last 24 hours) at 5/18/2022 1703  Last data filed at 5/17/2022 1815  Gross per 24 hour   Intake --   Output 2900 ml   Net -2900 ml      Physical Exam  Vitals and nursing note reviewed.   Constitutional:       General: She is not in acute distress.     Appearance: She is well-developed. She is not diaphoretic.   HENT:      Head: Normocephalic and atraumatic.      Right Ear: External ear normal.      Left Ear: External ear normal.      Nose: Nose normal. No congestion.      Mouth/Throat:      Pharynx: Oropharynx is clear.   Eyes:      General: No scleral icterus.     Extraocular Movements:  Extraocular movements intact.   Cardiovascular:      Rate and Rhythm: Normal rate and regular rhythm.      Pulses: Normal pulses.      Heart sounds: Normal heart sounds. No murmur heard.  Pulmonary:      Effort: Pulmonary effort is normal. No respiratory distress.      Breath sounds: Normal breath sounds. No wheezing or rales.   Abdominal:      General: Bowel sounds are normal. There is no distension.      Palpations: Abdomen is soft.      Tenderness: There is no abdominal tenderness. There is no guarding or rebound.   Musculoskeletal:      Cervical back: Normal range of motion.      Right lower leg: No edema.      Left lower leg: No edema.   Skin:     General: Skin is warm and dry.      Capillary Refill: Capillary refill takes less than 2 seconds.   Neurological:      General: No focal deficit present.      Mental Status: She is alert and oriented to person, place, and time. Mental status is at baseline.   Psychiatric:         Mood and Affect: Mood normal.         Behavior: Behavior normal.         Thought Content: Thought content normal.       Significant Labs: All pertinent labs within the past 24 hours have been reviewed.    Significant Imaging: I have reviewed all pertinent imaging results/findings within the past 24 hours.      Assessment/Plan:      * Hyperkalemia  -K 5.9 on admission now 7.7>5.5  -improved w/ HD  -shifted in ED  -monitor w/ BMP daily      Hyperglycemia due to type 1 diabetes mellitus  -  -recheck BMP s/p HD  -continue hos insulin regimen: see DM        End stage renal failure on dialysis  -home HD TTS  -urgent HD today for hyperkalemia  -nephrology following, recs appreciated  -renally dose medications  -renal diet    Type 1 diabetes mellitus with end-stage renal disease (ESRD)  -last A1c 8.3  -, monitoring  -home regimen held: lispro 10 units qam 12 units qhs, aspart 10 units tidwm  -hos regimen: detemir 12 units bid, aspart 10 units tidwm, mod dose SSI  -diabetic  diet  -endocrinology consulted, recs appreciated  -q4h accuchecks        VTE Risk Mitigation (From admission, onward)         Ordered     IP VTE LOW RISK PATIENT  Once         05/17/22 1411     Place sequential compression device  Until discontinued         05/17/22 1411     Place sequential compression device  Until discontinued         05/17/22 1409                Discharge Planning   TITA: 5/18/2022     Code Status: Full Code   Is the patient medically ready for discharge?: No    Reason for patient still in hospital (select all that apply): Patient trending condition, Laboratory test, Treatment and Consult recommendations  Discharge Plan A: Home   Discharge Delays: None known at this time              Bria Perales PA-C  Department of Hospital Medicine   [facility] - Telemetry Stepdown ([place]

## 2022-05-18 NOTE — HOSPITAL COURSE
Pt placed in observation for fistula site bleeding that did not require repair. K 7.7 on admit, underwent urgent HD with resolution. Nephrology followed for HD needs. Pt also has type 1 DM and was placed on a reduce home insulin dose at admission. BG >500 that initially improved but later continued to be elevated despite initiating home dose. Endocrinology consulted 5/18 and patient placed on insulin gtt with good control and was able to transition off gtt. Patient discharged on home insulin regimen. Follow up with outpatient endocrinologist.

## 2022-05-18 NOTE — CARE UPDATE
RAPID RESPONSE NURSE ROUND       Rounding completed with charge RN, Shoshana. No concerns verbalized at this time. Instructed to call [phone] for further concerns or assistance.

## 2022-05-18 NOTE — ASSESSMENT & PLAN NOTE
-last A1c 8.3  -, monitoring  -home regimen held: lispro 10 units qam 12 units qhs, aspart 10 units tidwm  -hos regimen: detemir 12 units bid, aspart 10 units tidwm, mod dose SSI  -diabetic diet  -endocrinology consulted, recs appreciated  -q4h accuchecks

## 2022-05-19 VITALS
WEIGHT: 135.81 LBS | HEART RATE: 89 BPM | DIASTOLIC BLOOD PRESSURE: 85 MMHG | TEMPERATURE: 98 F | BODY MASS INDEX: 23.18 KG/M2 | OXYGEN SATURATION: 99 % | RESPIRATION RATE: 17 BRPM | SYSTOLIC BLOOD PRESSURE: 152 MMHG | HEIGHT: 64 IN

## 2022-05-19 LAB
ANION GAP SERPL CALC-SCNC: 13 MMOL/L (ref 8–16)
BASOPHILS # BLD AUTO: 0.05 K/UL (ref 0–0.2)
BASOPHILS NFR BLD: 1 % (ref 0–1.9)
BUN SERPL-MCNC: 48 MG/DL (ref 6–20)
CALCIUM SERPL-MCNC: 8.2 MG/DL (ref 8.7–10.5)
CHLORIDE SERPL-SCNC: 105 MMOL/L (ref 95–110)
CO2 SERPL-SCNC: 18 MMOL/L (ref 23–29)
CREAT SERPL-MCNC: 6.9 MG/DL (ref 0.5–1.4)
DIFFERENTIAL METHOD: ABNORMAL
EOSINOPHIL # BLD AUTO: 0.1 K/UL (ref 0–0.5)
EOSINOPHIL NFR BLD: 2.3 % (ref 0–8)
ERYTHROCYTE [DISTWIDTH] IN BLOOD BY AUTOMATED COUNT: 15.9 % (ref 11.5–14.5)
EST. GFR  (AFRICAN AMERICAN): 8.6 ML/MIN/1.73 M^2
EST. GFR  (NON AFRICAN AMERICAN): 7.5 ML/MIN/1.73 M^2
GLUCOSE SERPL-MCNC: 210 MG/DL (ref 70–110)
HCT VFR BLD AUTO: 26.5 % (ref 37–48.5)
HGB BLD-MCNC: 8.7 G/DL (ref 12–16)
IMM GRANULOCYTES # BLD AUTO: 0.01 K/UL (ref 0–0.04)
IMM GRANULOCYTES NFR BLD AUTO: 0.2 % (ref 0–0.5)
LYMPHOCYTES # BLD AUTO: 1.9 K/UL (ref 1–4.8)
LYMPHOCYTES NFR BLD: 38 % (ref 18–48)
MCH RBC QN AUTO: 29.4 PG (ref 27–31)
MCHC RBC AUTO-ENTMCNC: 32.8 G/DL (ref 32–36)
MCV RBC AUTO: 90 FL (ref 82–98)
MONOCYTES # BLD AUTO: 0.4 K/UL (ref 0.3–1)
MONOCYTES NFR BLD: 7.6 % (ref 4–15)
NEUTROPHILS # BLD AUTO: 2.6 K/UL (ref 1.8–7.7)
NEUTROPHILS NFR BLD: 50.9 % (ref 38–73)
NRBC BLD-RTO: 0 /100 WBC
PHOSPHATE SERPL-MCNC: 6.3 MG/DL (ref 2.7–4.5)
PLATELET # BLD AUTO: 280 K/UL (ref 150–450)
PMV BLD AUTO: 11.9 FL (ref 9.2–12.9)
POCT GLUCOSE: 150 MG/DL (ref 70–110)
POCT GLUCOSE: 152 MG/DL (ref 70–110)
POCT GLUCOSE: 203 MG/DL (ref 70–110)
POCT GLUCOSE: 235 MG/DL (ref 70–110)
POCT GLUCOSE: 277 MG/DL (ref 70–110)
POCT GLUCOSE: 351 MG/DL (ref 70–110)
POTASSIUM SERPL-SCNC: 5.2 MMOL/L (ref 3.5–5.1)
RBC # BLD AUTO: 2.96 M/UL (ref 4–5.4)
SODIUM SERPL-SCNC: 136 MMOL/L (ref 136–145)
WBC # BLD AUTO: 5.11 K/UL (ref 3.9–12.7)

## 2022-05-19 PROCEDURE — 25000003 PHARM REV CODE 250: Mod: NTX | Performed by: NURSE PRACTITIONER

## 2022-05-19 PROCEDURE — C9399 UNCLASSIFIED DRUGS OR BIOLOG: HCPCS | Mod: NTX | Performed by: NURSE PRACTITIONER

## 2022-05-19 PROCEDURE — 85025 COMPLETE CBC W/AUTO DIFF WBC: CPT | Mod: NTX | Performed by: PHYSICIAN ASSISTANT

## 2022-05-19 PROCEDURE — G0378 HOSPITAL OBSERVATION PER HR: HCPCS | Mod: NTX

## 2022-05-19 PROCEDURE — 99217 PR OBSERVATION CARE DISCHARGE: ICD-10-PCS | Mod: NTX,,, | Performed by: PHYSICIAN ASSISTANT

## 2022-05-19 PROCEDURE — 90935 HEMODIALYSIS ONE EVALUATION: CPT | Mod: NTX,,, | Performed by: NURSE PRACTITIONER

## 2022-05-19 PROCEDURE — 99214 PR OFFICE/OUTPT VISIT, EST, LEVL IV, 30-39 MIN: ICD-10-PCS | Mod: NTX,,, | Performed by: NURSE PRACTITIONER

## 2022-05-19 PROCEDURE — 99214 OFFICE O/P EST MOD 30 MIN: CPT | Mod: NTX,,, | Performed by: NURSE PRACTITIONER

## 2022-05-19 PROCEDURE — G0257 UNSCHED DIALYSIS ESRD PT HOS: HCPCS | Mod: NTX

## 2022-05-19 PROCEDURE — 84100 ASSAY OF PHOSPHORUS: CPT | Mod: NTX | Performed by: PHYSICIAN ASSISTANT

## 2022-05-19 PROCEDURE — 80048 BASIC METABOLIC PNL TOTAL CA: CPT | Mod: NTX | Performed by: PHYSICIAN ASSISTANT

## 2022-05-19 PROCEDURE — 96366 THER/PROPH/DIAG IV INF ADDON: CPT | Mod: NTX

## 2022-05-19 PROCEDURE — 63600175 PHARM REV CODE 636 W HCPCS: Mod: NTX | Performed by: NURSE PRACTITIONER

## 2022-05-19 PROCEDURE — 90935 PR HEMODIALYSIS, ONE EVALUATION: ICD-10-PCS | Mod: NTX,,, | Performed by: NURSE PRACTITIONER

## 2022-05-19 PROCEDURE — 96372 THER/PROPH/DIAG INJ SC/IM: CPT | Performed by: NURSE PRACTITIONER

## 2022-05-19 PROCEDURE — 36415 COLL VENOUS BLD VENIPUNCTURE: CPT | Mod: NTX | Performed by: PHYSICIAN ASSISTANT

## 2022-05-19 PROCEDURE — 99217 PR OBSERVATION CARE DISCHARGE: CPT | Mod: NTX,,, | Performed by: PHYSICIAN ASSISTANT

## 2022-05-19 RX ORDER — SEVELAMER CARBONATE 800 MG/1
800 TABLET, FILM COATED ORAL
Status: DISCONTINUED | OUTPATIENT
Start: 2022-05-19 | End: 2022-05-19 | Stop reason: HOSPADM

## 2022-05-19 RX ORDER — GLUCAGON 1 MG
1 KIT INJECTION
Status: DISCONTINUED | OUTPATIENT
Start: 2022-05-19 | End: 2022-05-19 | Stop reason: HOSPADM

## 2022-05-19 RX ORDER — IBUPROFEN 200 MG
16 TABLET ORAL
Status: DISCONTINUED | OUTPATIENT
Start: 2022-05-19 | End: 2022-05-19 | Stop reason: HOSPADM

## 2022-05-19 RX ORDER — INSULIN ASPART 100 [IU]/ML
0-5 INJECTION, SOLUTION INTRAVENOUS; SUBCUTANEOUS
Status: DISCONTINUED | OUTPATIENT
Start: 2022-05-19 | End: 2022-05-19 | Stop reason: HOSPADM

## 2022-05-19 RX ORDER — IBUPROFEN 200 MG
24 TABLET ORAL
Status: DISCONTINUED | OUTPATIENT
Start: 2022-05-19 | End: 2022-05-19 | Stop reason: HOSPADM

## 2022-05-19 RX ORDER — INSULIN ASPART 100 [IU]/ML
8 INJECTION, SOLUTION INTRAVENOUS; SUBCUTANEOUS ONCE
Status: COMPLETED | OUTPATIENT
Start: 2022-05-19 | End: 2022-05-19

## 2022-05-19 RX ORDER — INSULIN ASPART 100 [IU]/ML
8 INJECTION, SOLUTION INTRAVENOUS; SUBCUTANEOUS
Status: DISCONTINUED | OUTPATIENT
Start: 2022-05-19 | End: 2022-05-19 | Stop reason: HOSPADM

## 2022-05-19 RX ADMIN — HYDRALAZINE HYDROCHLORIDE 25 MG: 25 TABLET, FILM COATED ORAL at 01:05

## 2022-05-19 RX ADMIN — INSULIN DETEMIR 10 UNITS: 100 INJECTION, SOLUTION SUBCUTANEOUS at 02:05

## 2022-05-19 RX ADMIN — HYDRALAZINE HYDROCHLORIDE 25 MG: 25 TABLET, FILM COATED ORAL at 06:05

## 2022-05-19 RX ADMIN — INSULIN ASPART 8 UNITS: 100 INJECTION, SOLUTION INTRAVENOUS; SUBCUTANEOUS at 02:05

## 2022-05-19 NOTE — PLAN OF CARE
Problem: Infection  Goal: Absence of Infection Signs and Symptoms  Outcome: Met     Problem: Device-Related Complication Risk (Hemodialysis)  Goal: Safe, Effective Therapy Delivery  Outcome: Met     Problem: Hemodynamic Instability (Hemodialysis)  Goal: Effective Tissue Perfusion  Outcome: Met     Problem: Infection (Hemodialysis)  Goal: Absence of Infection Signs and Symptoms  Outcome: Met     Problem: Adult Inpatient Plan of Care  Goal: Plan of Care Review  Outcome: Met  Goal: Patient-Specific Goal (Individualized)  Outcome: Met  Goal: Absence of Hospital-Acquired Illness or Injury  Outcome: Met  Goal: Optimal Comfort and Wellbeing  Outcome: Met  Goal: Readiness for Transition of Care  Outcome: Met     Problem: Electrolyte Imbalance (Chronic Kidney Disease)  Goal: Electrolyte Balance  Outcome: Met     Problem: Fluid Volume Excess (Chronic Kidney Disease)  Goal: Fluid Balance  Outcome: Met     Problem: Diabetes Comorbidity  Goal: Blood Glucose Level Within Targeted Range  Outcome: Met     Problem: Fluid and Electrolyte Imbalance (Acute Kidney Injury/Impairment)  Goal: Fluid and Electrolyte Balance  Outcome: Met     Problem: Oral Intake Inadequate (Acute Kidney Injury/Impairment)  Goal: Optimal Nutrition Intake  Outcome: Met     Problem: Renal Function Impairment (Acute Kidney Injury/Impairment)  Goal: Effective Renal Function  Outcome: Met

## 2022-05-19 NOTE — PROGRESS NOTES
[facility] ST[facility]FF HEMODIALYSIS NOTE     Patient currently on hemodialysis for removal of uremic toxins and volume.     Patient seen and evaluated on hemodialysis, tolerating treatment, see HD flowsheet for vitals and assessments.    Labs have been reviewed and the dialysate bath has been adjusted.       Assessment/Plan:    -Patient seen on HD, tolerating treatment well, w/o complaints   -UF goal of 3-4L   -Renal diet, if not NPO   -Strict I/O's and daily weights  -Daily renal function panels  -Keep MAP >65 while on HD   -Maintain Hgb >7.0  -CKMB - continue sevelamer, continue calcitriol   -phos 6.3  -Will continue to follow while inpatient     Lashanda Ruiz DNP-FNP, C  Nephrology  Pager: [phone]

## 2022-05-19 NOTE — PROGRESS NOTES
Patient arrived via stretcher.  Awake, alert and responsive.  VSS. HD TX started via MILLICENT fistula.

## 2022-05-19 NOTE — DISCHARGE SUMMARY
"[facility] - Telemetry [facility] Medicine  Discharge Summary      Patient Name: Isabela Read  MRN: [id number]  Patient Class: OP- Observation  Admission Date: 5/17/2022  Hospital Length of Stay: 0 days  Discharge Date and Time: 5/19/2022  4:53 PM  Attending Physician: Michelle Mendez MD   Discharging Provider: Shanika Corral PA-C  Primary Care Provider: Tavo Bhatia MD  [facility] Medicine Team: [facility] E Shanika Corral PA-C    HPI:   Ms. Read is a 27 year old female with history of ESRD (TT), HTN, DM, who presents for fistula sight bleeding. Patient states that during dialysis today after the needle was placed the machine was not acting right. States that the dialysis center disconnected her from the machine with the needle still her her arm. States she went to use the restroom and the needle came out accidentally and she had some bleeding from the site. States it was "a lot of blood", with bleeding controlled with direct pressure. Patient states she was told to come to the hospital for evaluation of her CBC to ensure she didn't need a transfusion. Denies f/c, lightheadedness, headache, chest pain, SOB, n/v/d, abdominal pain, dysuria, leg swelling.    In ED, AFVSS. Hgb 9.9. CMP significant for Na 130, K 7.7, Cl 93, HCO3 22, AG 15, . Pt given IVF, K shifted, calcium gluconate. Nephrology consulted for urgent HD needs.      * No surgery found *      Hospital Course:   Pt placed in observation for fistula site bleeding that did not require repair. K 7.7 on admit, underwent urgent HD with resolution. Nephrology followed for HD needs. Pt also has type 1 DM and was placed on a reduce home insulin dose at admission. BG >500 that initially improved but later continued to be elevated despite initiating home dose. Endocrinology consulted 5/18 and patient placed on insulin gtt with good control and was able to transition off gtt. Patient discharged on home insulin regimen. Follow up " with outpatient endocrinologist.        Goals of Care Treatment Preferences:  Code Status: Full Code      Consults:   Consults (From admission, onward)          Status Ordering Provider     Inpatient consult to Endocrinology  Once        Provider:  (Not yet assigned)    Completed CARLOS JUNIOR     [facility] Medicine PharmD Consult  Once        Provider:  (Not yet assigned)    Completed UBALDO SKELTON     Inpatient consult to Internal Medicine  Once        Provider:  (Not yet assigned)    Acknowledged MIGUE MARIE JR     Inpatient consult to Nephrology  Once        Provider:  (Not yet assigned)    Completed MIGUE MARIE JR            * Hyperkalemia  -K 5.9 on admission now 7.7>5.5  -shifted in ED  - underwent urgent HD with resolution  -monitor w/ BMP daily      End stage renal failure on dialysis  -patient sent from HD clinic for bleeding fistula site. Hemostasis achieved prior to arrival.  - Hgb stable, no need for transfusion   - home HD TTS  -urgent HD today for hyperkalemia  -nephrology following, recs appreciated  -renally dose medications  -renal diet  - underwent HD 5/19 prior to discharge    Type 1 diabetes mellitus with end-stage renal disease (ESRD)  -last A1c 8.3  -, monitoring  -home regimen held: lispro 10 units qam 12 units qhs, aspart 10 units tidwm  -hos regimen: detemir 12 units bid, aspart 10 units tidwm, mod dose SSI  -diabetic diet  -endocrinology consulted, recs appreciated  - started on insulin gtt for hyperglycemia, able to transition off, glucose 150 at discharge  - continue home insulin regimen at discharge  - follow up with endocrinology        Final Active Diagnoses:    Diagnosis Date Noted POA    PRINCIPAL PROBLEM:  Hyperkalemia [E87.5] 11/07/2020 Yes    End stage renal failure on dialysis [N18.6, Z99.2]  Not Applicable    Hyperglycemia due to type 1 diabetes mellitus [E10.65] 03/04/2021 Yes    Type 1 diabetes mellitus with end-stage renal disease (ESRD)  [E10.22, N18.6] 02/24/2015 Yes     Chronic      Problems Resolved During this Admission:       Discharged Condition: good    Disposition: Home or Self Care    Follow Up:   Follow-up Information       Tavo Bhatia MD .    Specialties: Family Medicine, Sports Medicine  Contact information:  [address]  [facility] [address]  [phone]               [facility] - Emergency Dept .    Specialty: Emergency Medicine  Contact information:  [address]  [facility] [address] [phone]                         Patient Instructions:      Ambulatory referral/consult to Outpatient Case Management   Referral Priority: Routine Referral Type: Consultation   Referral Reason: Specialty Services Required   Number of Visits Requested: 1     Diet renal     Diet diabetic     Notify your health care provider if you experience any of the following:  increased confusion or weakness     Notify your health care provider if you experience any of the following:  severe uncontrolled pain     Notify your health care provider if you experience any of the following:  difficulty breathing or increased cough     Activity as tolerated       Significant Diagnostic Studies: Labs:   CMP   Recent Labs   Lab 05/18/22  1324 05/18/22  1840 05/19/22  0557   * 132* 136   K 5.5* 6.0* 5.2*    101 105   CO2 18* 18* 18*   * 417* 210*   BUN 36* 41* 48*   CREATININE 5.5* 6.4* 6.9*   CALCIUM 8.2* 8.2* 8.2*   ANIONGAP 14 13 13   ESTGFRAFRICA 11.4* 9.5* 8.6*   EGFRNONAA 9.9* 8.2* 7.5*   , CBC   Recent Labs   Lab 05/19/22  0557   WBC 5.11   HGB 8.7*   HCT 26.5*       and A1C:   Recent Labs   Lab 05/08/22  0815   HGBA1C 8.3*       Pending Diagnostic Studies:       None           Medications:  Reconciled Home Medications:      Medication List        CHANGE how you take these medications      insulin detemir U-100 100 unit/mL (3 mL) Inpn pen  Commonly known as: Levemir FLEXTOUCH  10 units subcutaneously in the  "morning and 12 units sq at night  What changed: additional instructions            CONTINUE taking these medications      aspirin 81 MG EC tablet  Commonly known as: ECOTRIN  Take 81 mg by mouth once daily.     blood sugar diagnostic Strp  To check BG 4 - 6 times daily, to use with insurance preferred meter     calcitRIOL 0.5 MCG Cap  Commonly known as: ROCALTROL  Take 0.5 mcg by mouth once daily.     carvediloL 25 MG tablet  Commonly known as: COREG  Take 25 mg by mouth 2 (two) times daily with meals.     fluticasone propionate 50 mcg/actuation nasal spray  Commonly known as: FLONASE  1 spray by Each Nostril route daily as needed for Rhinitis or Allergies.     FREESTYLE MARCY 14 DAY READER Misc  Generic drug: flash glucose scanning reader  1 each by Misc.(Non-Drug; Combo Route) route once daily.     FREESTYLE MARCY 14 DAY SENSOR Kit  Generic drug: flash glucose sensor  6 kits by Misc.(Non-Drug; Combo Route) route once daily.     HAIR,SKIN AND NAILS(FA-BIOTIN) ORAL  Take 1 tablet by mouth once daily.     hydrALAZINE 25 MG tablet  Commonly known as: APRESOLINE  Take 2 tablets (50 mg total) by mouth every 8 (eight) hours.     insulin lispro 100 unit/mL pen  Commonly known as: HumaLOG KwikPen Insulin  Inject 10 units into skin the skin 3 three times daily with meals     lancets Misc  To check BG 4 - 6 times daily, to use with insurance preferred meter     NIFEdipine 60 MG (OSM) 24 hr tablet  Commonly known as: PROCARDIA-XL  Take 60 mg by mouth 2 (two) times a day.     pen needle, diabetic 32 gauge x 5/32" Ndle  For three times daily injection     sevelamer carbonate 800 mg Tab  Commonly known as: RENVELA  TAKE 1 TABLET BY MOUTH THREE TIMES DAILY WITH MEALS     traZODone 50 MG tablet  Commonly known as: DESYREL  Take 1 tablet (50 mg total) by mouth nightly as needed for Insomnia.     valsartan 160 MG tablet  Commonly known as: DIOVAN  Take 1 tablet (160 mg total) by mouth once daily. Hold off on taking until told to " restart by a physician.     venlafaxine 37.5 MG 24 hr capsule  Commonly known as: EFFEXOR XR  Take 1 capsule (37.5 mg total) by mouth once daily.              Indwelling Lines/Drains at time of discharge:   Lines/Drains/Airways       Central Venous Catheter Line  Duration                  Hemodialysis Catheter right subclavian -- days              Drain  Duration                  Hemodialysis AV Fistula Left upper arm -- days                    Time spent on the discharge of patient: 36 minutes         Shanika Corral PA-C  Department of Hospital Medicine  [facility] - Telemetry Stepdown

## 2022-05-19 NOTE — ASSESSMENT & PLAN NOTE
-K 5.9 on admission now 7.7>5.5  -shifted in ED  - underwent urgent HD with resolution  -monitor w/ BMP daily

## 2022-05-19 NOTE — CONSULTS
[facility] - Telemetry Stepdown  Endocrinology  Diabetes Consult Note    Consult Requested by: Michelle Mendez MD   Reason for admit: Hyperkalemia    HISTORY OF PRESENT ILLNESS:  Reason for Consult: Management of T1DM, Hyperglycemia       Diabetes diagnosis year: at 8 years of age      Home Diabetes Medications:  ( Per Dr. Mayo with [facility])  Levemir 10 units in the am and 12 units q HS, Humalog 10 units with meals     How often checking glucose at home?  Freestyle Jayda    BG readings on regimen: variable per patient (100-300s)   Hypoglycemia on the regimen?  Yes, 1-2x weekly  Missed doses on regimen?  No     Diabetes Complications include:     Hyperglycemia, Hypoglycemia , gastroparesis, Diabetic nephropathy, and Diabetic chronic kidney disease          Complicating diabetes co morbidities:   HTN, ESRD on dialysis       HPI:   Patient is a 27 y.o. female with a diagnosis of ESRD ([facility]), HTN, DM, who presents for fistula sight bleeding.  bleeding controlled with direct pressure. Patient states she was told to come to the hospital for evaluation of her CBC to ensure she didn't need a transfusion. In ED, AFVSS. Hgb 9.9. CMP significant for Na 130, K 7.7, Cl 93, HCO3 22, AG 15, . Pt given IVF, K shifted, calcium gluconate. Nephrology consulted for urgent HD needs. Endocrinology consulted on 2022.    Lab Results   Component Value Date    HGBA1C 8.3 (H) 2022           Interval HPI:   Overnight events: Patient  completed HD this morning. BG has trended down and now within goal ranges on IV insulin infusion at 1 u/hr. [facility] diabetic [facility];  Calorie; Renal, Cardiac (Low Na/Chol), Low Potassium  Eatin%  Nausea: No  Hypoglycemia and intervention: No  Fever: No  TPN and/or TF: No  If yes, type of TF/TPN and rate: n/a    PMH, PSH, FH, SH reviewed     ROS:  Constitutional: Negative for weight changes.  Eyes: Negative for visual disturbance.  Respiratory: Negative for  cough.   Cardiovascular: Negative for chest pain.  Gastrointestinal: Negative for nausea.  Endocrine: Negative for polyuria, polydipsia.  Musculoskeletal: Negative for back pain.  Skin: Negative for rash.  Neurological: Negative for syncope.  Psychiatric/Behavioral: Negative for depression.    Review of Systems    Current Medications and/or Treatments Impacting Glycemic Control  Immunotherapy:    Immunosuppressants       None          Steroids:   Hormones (From admission, onward)                Start     Stop Route Frequency Ordered    05/17/22 1507  melatonin tablet 6 mg         -- Oral Nightly PRN 05/17/22 1409          Pressors:    Autonomic Drugs (From admission, onward)                None          Hyperglycemia/Diabetes Medications:   Antihyperglycemics (From admission, onward)                Start     Stop Route Frequency Ordered    05/19/22 0715  insulin aspart U-100 pen 4-8 Units         -- SubQ 3 times daily with meals 05/18/22 1837 05/18/22 1945  insulin regular in 0.9 % NaCl 100 unit/100 mL (1 unit/mL) infusion        Question:  Enter initial dose (Units/hr):  Answer:  1    -- IV Continuous 05/18/22 1836 05/18/22 1936  insulin aspart U-100 pen 0-10 Units         -- SubQ As needed (PRN) 05/18/22 1836 05/17/22 1330  insulin regular injection 5 Units  (Hyperkalemia Medications)         05/18 0129 IV ED 1 Time 05/17/22 1325             PHYSICAL EXAMINATION:  Vitals:    05/19/22 1100   BP: (!) 152/85   Pulse: 89   Resp:    Temp:      Body mass index is 23.31 kg/m².    Physical Exam  Constitutional: Well developed, well nourished, NAD.  ENT: External ears no masses with nose patent; normal hearing.  Neck: Supple; trachea midline.  Cardiovascular: Normal heart sounds, no LE edema. DP +2 bilaterally.  Lungs: Normal effort; lungs anterior bilaterally clear to auscultation.  Abdomen: Soft, no masses, no hernias.  MS: No clubbing or cyanosis of nails noted; unable to assess gait.  Skin: No rashes, lesions,  or ulcers; no nodules. Injection sites are ok. No lipo hypertropthy or atrophy.  Psychiatric: Good judgement and insight; normal mood and affect.  Neurological: Cranial nerves are grossly intact.   Foot: Nails in good condition, no amputations noted.        Labs Reviewed and Include   Recent Labs   Lab 05/19/22  0557   *   CALCIUM 8.2*      K 5.2*   CO2 18*      BUN 48*   CREATININE 6.9*     Lab Results   Component Value Date    WBC 5.11 05/19/2022    HGB 8.7 (L) 05/19/2022    HCT 26.5 (L) 05/19/2022    MCV 90 05/19/2022     05/19/2022     No results for input(s): TSH, FREET4 in the last 168 hours.  Lab Results   Component Value Date    HGBA1C 8.3 (H) 05/08/2022       Nutritional status:   Body mass index is 23.31 kg/m².  Lab Results   Component Value Date    ALBUMIN 2.8 (L) 05/10/2022    ALBUMIN 3.1 (L) 05/09/2022    ALBUMIN 2.9 (L) 05/09/2022     No results found for: PREALBUMIN    Estimated Creatinine Clearance: 10.6 mL/min (A) (based on SCr of 6.9 mg/dL (H)).    Accu-Checks  Recent Labs     05/18/22  1417 05/18/22  1705 05/18/22  1959 05/18/22  2214 05/19/22  0032 05/19/22  0301 05/19/22  0542 05/19/22  0736 05/19/22  0926 05/19/22  1331   POCTGLUCOSE 476* 391* 389* 340* 351* 277* 235* 203* 152* 150*        ASSESSMENT and PLAN    * Hyperkalemia  Managed per primary team  Avoid hypoglycemia        Type 1 diabetes mellitus with end-stage renal disease (ESRD)  BG goal 140-180    Discontinue IV transition insulin infusion at 1 u/hr  Start Levemir 10 units daily and 12 units q HS (home dosing)  Start Novolog 8 units TID with meals (20% reduction from home dose)  Start Low  Dose Correction Scale  BG monitoring ac/hs    ** Please call Endocrine for any BG related issues **    Discharge plans:  -Recommend patient resume home regimen and follow up with Dr. Mayo at [facility] endocrine clinic as scheduled.     Lab Results   Component Value Date    HGBA1C 8.3 (H) 05/08/2022           End stage renal  failure on dialysis  Lab Results   Component Value Date    CREATININE 6.9 (H) 05/19/2022     Avoid insulin stacking  Titrate insulin slowly          Plan discussed with patient at bedside.     Holli Good NP  Endocrinology  [facility] - Telemetry Stepdown

## 2022-05-19 NOTE — PLAN OF CARE
SW assigned to pt's care team.  Per notes, pt has HD set up with [facility] [phone] on [facility].    Sabrina Alejo LMSW  PRN-  [facility]  Ext. [phone]

## 2022-05-19 NOTE — NURSING
RN called 8 [facility] to give report to nurse taking [phone]. RN asked me to give her 5-10 minutes and call back.

## 2022-05-19 NOTE — ASSESSMENT & PLAN NOTE
BG goal 140-180    Discontinue IV transition insulin infusion at 1 u/hr  Start Levemir 10 units daily and 12 units q HS (home dosing)  Start Novolog 8 units TID with meals (20% reduction from home dose)  Start Low  Dose Correction Scale  BG monitoring ac/hs    ** Please call Endocrine for any BG related issues **    Discharge plans:  -Recommend patient resume home regimen and follow up with Dr. Mayo at [facility] endocrine clinic as scheduled.     Lab Results   Component Value Date    HGBA1C 8.3 (H) 05/08/2022

## 2022-05-19 NOTE — SUBJECTIVE & OBJECTIVE
Interval HPI:   Overnight events: Patient  completed HD this morning. BG has trended down and now within goal ranges on IV insulin infusion at 1 u/hr. Diet diabetic [facility];  Calorie; Renal, Cardiac (Low Na/Chol), Low Potassium  Eatin%  Nausea: No  Hypoglycemia and intervention: No  Fever: No  TPN and/or TF: No  If yes, type of TF/TPN and rate: n/a    PMH, PSH, FH, SH reviewed     ROS:  Constitutional: Negative for weight changes.  Eyes: Negative for visual disturbance.  Respiratory: Negative for cough.   Cardiovascular: Negative for chest pain.  Gastrointestinal: Negative for nausea.  Endocrine: Negative for polyuria, polydipsia.  Musculoskeletal: Negative for back pain.  Skin: Negative for rash.  Neurological: Negative for syncope.  Psychiatric/Behavioral: Negative for depression.    Review of Systems    Current Medications and/or Treatments Impacting Glycemic Control  Immunotherapy:    Immunosuppressants       None          Steroids:   Hormones (From admission, onward)                Start     Stop Route Frequency Ordered    22 1507  melatonin tablet 6 mg         -- Oral Nightly PRN 22 1409          Pressors:    Autonomic Drugs (From admission, onward)                None          Hyperglycemia/Diabetes Medications:   Antihyperglycemics (From admission, onward)                Start     Stop Route Frequency Ordered    22 0715  insulin aspart U-100 pen 4-8 Units         -- SubQ 3 times daily with meals 22 1945  insulin regular in 0.9 % NaCl 100 unit/100 mL (1 unit/mL) infusion        Question:  Enter initial dose (Units/hr):  Answer:  1    -- IV Continuous 22 1936  insulin aspart U-100 pen 0-10 Units         -- SubQ As needed (PRN) 22 18322 1330  insulin regular injection 5 Units  (Hyperkalemia Medications)          0129 IV ED 1 Time 22 1325             PHYSICAL EXAMINATION:  Vitals:    22 1100    BP: (!) 152/85   Pulse: 89   Resp:    Temp:      Body mass index is 23.31 kg/m².    Physical Exam  Constitutional: Well developed, well nourished, NAD.  ENT: External ears no masses with nose patent; normal hearing.  Neck: Supple; trachea midline.  Cardiovascular: Normal heart sounds, no LE edema. DP +2 bilaterally.  Lungs: Normal effort; lungs anterior bilaterally clear to auscultation.  Abdomen: Soft, no masses, no hernias.  MS: No clubbing or cyanosis of nails noted; unable to assess gait.  Skin: No rashes, lesions, or ulcers; no nodules. Injection sites are ok. No lipo hypertropthy or atrophy.  Psychiatric: Good judgement and insight; normal mood and affect.  Neurological: Cranial nerves are grossly intact.   Foot: Nails in good condition, no amputations noted.

## 2022-05-19 NOTE — NURSING
BEDSIDE NURSING PROGRESS REPORT     Patient identified and verified as    Isabela Read is a 27 y.o. female who came to hospital with complaints of Bleeding (Bleeding from Dialysis graft  controlled on arrival)    She was admitted on 5/17/2022    CODE STATUS: FULL CODE      Review of patient's allergies indicates:  No Known Allergies    Past Medical History:   Diagnosis Date    Anemia     Chronic hypertension with exacerbation during pregnancy in second trimester 11/6/2020    Current regimen (11/6/20):  - Carvedilol 12.5 mg BID - Nifedipine 30 mg daily Baseline CKD + proteinuria    Chronic kidney disease     Depression     Diabetes mellitus     Diabetic retinopathy     Diarrhea     Encounter for blood transfusion     Gastroparesis     Glaucoma     Hepatomegaly 4/29/2021    Hx of psychiatric care     Hyperlipidemia     Hypertension     Nephrotic syndrome     Nephrotic syndrome 3/4/2021    Palpitations     Poor fetal growth affecting management of mother in second trimester 10/15/2020    Restrictive lung disease     Retinal detachment     Severe pre-eclampsia in second trimester 11/6/2020       __________    Pt VS within pt normal range and afebrile upon arrival.     Pt opens eyes when spoken too, behavior appropriate to situation, follows commands, facial expression symmetrical. Alert and oriented x 4.      Patient appears in no acute distress during bedside report and denies pain at this time.     The skin is warm and dry, color consistent with ethnicity, patient has normal skin turgor and moist mucus membranes.     Patient moving all available extremities spontaneously and independently ambulatory.      Airway is open and patent, respirations are spontaneous, patient has a normal effort and rate, no accessory muscle use noted, O2 sats noted at 97% on room air.     Pt placed on bedside cardiac monitor. Patient has a normal rate and rhythm, no edema noted, capillary refill < 3 seconds on  all available extremities.     Abdomen nondistended and nontender to palpation. Normoactive bowel sounds present in all four quadrants.     Pt blood glucose levels have been elevated throughout the shift. Endocrine consultation ordered.    Other Assessment findings documented in flow sheet.     Care plan reviewed and adjusted appropriately. Education provided and documented.     IV/ Ports/ Drains/ Wounds  Accesses Present:       Hemodialysis Catheter right subclavian (Active)   Verification by X-ray Yes 01/19/22 0725   Site Assessment No drainage;No redness 05/10/22 0800   Line Securement Device Secured with sutures 05/10/22 0800   Dressing Type Biopatch in place 05/10/22 0800   Dressing Status Clean;Dry;Intact 05/10/22 0800   Dressing Intervention Integrity maintained 05/10/22 0800   Date on Dressing 01/22/22 01/19/22 0725   Dressing Due to be Changed 10/29/21 10/24/21 0800   Venous Patency/Care infusing 01/19/22 1556   Arterial Patency/Care infusing 01/19/22 1556   Line Necessity Review CRRT/HD 01/19/22 1556            Peripheral IV - Single Lumen 05/17/22 0955 20 G Right Antecubital (Active)   Site Assessment Clean;Dry;Intact;No redness 05/18/22 1600   Extremity Assessment Distal to IV No abnormal discoloration;No redness;No swelling;No warmth 05/17/22 0955   Line Status Flushed;Saline locked;Blood return noted 05/17/22 0955   Dressing Status Clean;Dry;Intact 05/17/22 0955   Dressing Intervention First dressing 05/17/22 0955            Hemodialysis AV Fistula Left upper arm (Active)   Needle Size 15ga 05/17/22 1500   Site Assessment Clean;Dry;Intact 05/17/22 1815   Patency Present;Thrill;Bruit 05/17/22 1815   Status Deaccessed 05/17/22 1815   Flows Good 05/17/22 1815   Dressing Intervention First dressing 05/17/22 1500   Dressing Status Clean;Dry;Intact 05/17/22 1815   Site Condition No complications 05/17/22 1815   Dressing Pressure dressing 05/17/22 1815            Incision/Site 12/10/21 1202 Left Arm (Active)    Incision WDL WDL 01/19/22 1115   Dressing Appearance Open to air 01/19/22 0725   Drainage Amount None 01/19/22 0725   Drainage Characteristics/Odor No odor 01/19/22 0725   Appearance Dressing in place, unable to visualize 12/10/21 1400   Dressing Gauze 12/10/21 1400            Incision/Site 01/25/22 1210 Right Eye (Active)   Incision WDL WDL 05/09/22 0800   Dressing Appearance Dry;Intact;Clean 02/01/22 1709   Drainage Amount None 02/01/22 1709   Appearance Dressing in place, unable to visualize 02/01/22 1709            Incision/Site 02/01/22 1535 Right Eye (Active)   Incision WDL WDL 05/10/22 0800   Dressing Appearance Dry;Intact;Clean 05/10/22 0800   Drainage Amount None 02/01/22 1830   Drainage Characteristics/Odor No odor 02/01/22 1830   Appearance Dressing in place, unable to visualize 02/01/22 1830   Dressing Other (comment) 02/01/22 1830         Telebox: YES (Bedside)    Goal for this shift:  Manage blood sugar levels between 140-180.

## 2022-05-19 NOTE — NURSING
RN informed patient of pending transfer to 8th floor [facility]. Educated patient on insulin drip that has been ordered and that this unit is not appropriate for this type of drip. Patient states she understands and is in ok with transfer.

## 2022-05-19 NOTE — NURSING
Discharge patient home. AVS printed an reviewed with patient, questions asked and answered.Glucose management education provided. IV access removed. Patient walked down to lobby independently with steady gait, awake and alert at time of d/c. Patient d/c'd with no issues.

## 2022-05-19 NOTE — ASSESSMENT & PLAN NOTE
Lab Results   Component Value Date    CREATININE 6.9 (H) 05/19/2022     Avoid insulin stacking  Titrate insulin slowly

## 2022-05-19 NOTE — ASSESSMENT & PLAN NOTE
-last A1c 8.3  -, monitoring  -home regimen held: lispro 10 units qam 12 units qhs, aspart 10 units tidwm  -hos regimen: detemir 12 units bid, aspart 10 units tidwm, mod dose SSI  -diabetic diet  -endocrinology consulted, recs appreciated  - started on insulin gtt for hyperglycemia, able to transition off, glucose 150 at discharge  - continue home insulin regimen at discharge  - follow up with endocrinology

## 2022-05-19 NOTE — ASSESSMENT & PLAN NOTE
-patient sent from HD clinic for bleeding fistula site. Hemostasis achieved prior to arrival.  - Hgb stable, no need for transfusion   - home HD TTS  -urgent HD today for hyperkalemia  -nephrology following, recs appreciated  -renally dose medications  -renal diet  - underwent HD 5/19 prior to discharge

## 2022-05-19 NOTE — PROGRESS NOTES
HD TX complete.  Ran for 4 hrs.  Net fluid removal is 2700 ml.  VSS.  Tolerated dialysis well/ needles removed from exit sites. Pressure applied, hemostasis achieved.  Report given to primary nurse.  To return to floor via stretcher.

## 2022-05-20 ENCOUNTER — PATIENT MESSAGE (OUTPATIENT)
Dept: OBSTETRICS AND GYNECOLOGY | Facility: CLINIC | Age: 27
End: 2022-05-20
Payer: MEDICAID

## 2022-05-20 LAB — POCT GLUCOSE: 145 MG/DL (ref 70–110)

## 2022-05-23 ENCOUNTER — OFFICE VISIT (OUTPATIENT)
Dept: URGENT CARE | Facility: CLINIC | Age: 27
End: 2022-05-23
Payer: MEDICAID

## 2022-05-23 VITALS
BODY MASS INDEX: 22.58 KG/M2 | RESPIRATION RATE: 18 BRPM | WEIGHT: 132.25 LBS | OXYGEN SATURATION: 100 % | HEIGHT: 64 IN | TEMPERATURE: 98 F | HEART RATE: 88 BPM | SYSTOLIC BLOOD PRESSURE: 142 MMHG | DIASTOLIC BLOOD PRESSURE: 87 MMHG

## 2022-05-23 DIAGNOSIS — N04.9 NEPHROTIC SYNDROME: Chronic | ICD-10-CM

## 2022-05-23 DIAGNOSIS — Z99.2 ANEMIA DUE TO CHRONIC KIDNEY DISEASE, ON CHRONIC DIALYSIS: ICD-10-CM

## 2022-05-23 DIAGNOSIS — Z99.2 END STAGE RENAL FAILURE ON DIALYSIS: ICD-10-CM

## 2022-05-23 DIAGNOSIS — E10.22 TYPE 1 DIABETES MELLITUS WITH END-STAGE RENAL DISEASE (ESRD): Chronic | ICD-10-CM

## 2022-05-23 DIAGNOSIS — D63.1 ANEMIA DUE TO CHRONIC KIDNEY DISEASE, ON CHRONIC DIALYSIS: ICD-10-CM

## 2022-05-23 DIAGNOSIS — R11.2 NON-INTRACTABLE VOMITING WITH NAUSEA, UNSPECIFIED VOMITING TYPE: ICD-10-CM

## 2022-05-23 DIAGNOSIS — R68.83 CHILLS (WITHOUT FEVER): Primary | ICD-10-CM

## 2022-05-23 DIAGNOSIS — N18.6 TYPE 1 DIABETES MELLITUS WITH END-STAGE RENAL DISEASE (ESRD): Chronic | ICD-10-CM

## 2022-05-23 DIAGNOSIS — N18.6 END STAGE RENAL FAILURE ON DIALYSIS: ICD-10-CM

## 2022-05-23 DIAGNOSIS — N18.6 ANEMIA DUE TO CHRONIC KIDNEY DISEASE, ON CHRONIC DIALYSIS: ICD-10-CM

## 2022-05-23 PROBLEM — G44.1 VASCULAR HEADACHE, NOT ELSEWHERE CLASSIFIED: Status: ACTIVE | Noted: 2021-05-31

## 2022-05-23 PROBLEM — R80.9 PROTEINURIA, UNSPECIFIED: Status: ACTIVE | Noted: 2021-05-28

## 2022-05-23 PROBLEM — E10.43: Status: ACTIVE | Noted: 2021-05-28

## 2022-05-23 PROBLEM — J96.21 ACUTE AND CHRONIC RESPIRATORY FAILURE WITH HYPOXIA: Status: ACTIVE | Noted: 2022-02-04

## 2022-05-23 PROBLEM — D68.8 OTHER SPECIFIED COAGULATION DEFECTS: Status: ACTIVE | Noted: 2021-05-31

## 2022-05-23 PROBLEM — I12.0 HYPERTENSIVE CHRONIC KIDNEY DISEASE WITH STAGE 5 CHRONIC KIDNEY DISEASE OR END STAGE RENAL DISEASE: Status: ACTIVE | Noted: 2021-05-31

## 2022-05-23 PROBLEM — E83.51 HYPOCALCEMIA: Status: ACTIVE | Noted: 2022-02-22

## 2022-05-23 PROBLEM — F32.9 MAJOR DEPRESSIVE DISORDER, SINGLE EPISODE, UNSPECIFIED: Status: ACTIVE | Noted: 2021-05-28

## 2022-05-23 PROBLEM — K31.84 GASTROPARESIS: Status: ACTIVE | Noted: 2021-05-28

## 2022-05-23 PROBLEM — E44.0 MODERATE PROTEIN-CALORIE MALNUTRITION: Status: ACTIVE | Noted: 2021-06-21

## 2022-05-23 PROBLEM — E10.649 TYPE 1 DIABETES MELLITUS WITH HYPOGLYCEMIA WITHOUT COMA: Status: ACTIVE | Noted: 2021-05-28

## 2022-05-23 PROBLEM — R76.11 NONSPECIFIC REACTION TO TUBERCULIN SKIN TEST WITHOUT ACTIVE TUBERCULOSIS: Status: ACTIVE | Noted: 2021-10-01

## 2022-05-23 PROBLEM — N18.9 ANEMIA IN CHRONIC KIDNEY DISEASE: Status: ACTIVE | Noted: 2021-05-28

## 2022-05-23 PROCEDURE — 3052F PR MOST RECENT HEMOGLOBIN A1C LEVEL 8.0 - < 9.0%: ICD-10-PCS | Mod: CPTII,S$GLB,TXP, | Performed by: FAMILY MEDICINE

## 2022-05-23 PROCEDURE — 3077F SYST BP >= 140 MM HG: CPT | Mod: CPTII,S$GLB,TXP, | Performed by: FAMILY MEDICINE

## 2022-05-23 PROCEDURE — 1160F PR REVIEW ALL MEDS BY PRESCRIBER/CLIN PHARMACIST DOCUMENTED: ICD-10-PCS | Mod: CPTII,S$GLB,TXP, | Performed by: FAMILY MEDICINE

## 2022-05-23 PROCEDURE — 3008F BODY MASS INDEX DOCD: CPT | Mod: CPTII,S$GLB,TXP, | Performed by: FAMILY MEDICINE

## 2022-05-23 PROCEDURE — 1160F RVW MEDS BY RX/DR IN RCRD: CPT | Mod: CPTII,S$GLB,TXP, | Performed by: FAMILY MEDICINE

## 2022-05-23 PROCEDURE — 1159F MED LIST DOCD IN RCRD: CPT | Mod: CPTII,S$GLB,TXP, | Performed by: FAMILY MEDICINE

## 2022-05-23 PROCEDURE — 99499 UNLISTED E&M SERVICE: CPT | Mod: S$GLB,TXP,, | Performed by: FAMILY MEDICINE

## 2022-05-23 PROCEDURE — 3066F NEPHROPATHY DOC TX: CPT | Mod: CPTII,S$GLB,TXP, | Performed by: FAMILY MEDICINE

## 2022-05-23 PROCEDURE — 4010F PR ACE/ARB THEARPY RXD/TAKEN: ICD-10-PCS | Mod: CPTII,S$GLB,TXP, | Performed by: FAMILY MEDICINE

## 2022-05-23 PROCEDURE — 3079F DIAST BP 80-89 MM HG: CPT | Mod: CPTII,S$GLB,TXP, | Performed by: FAMILY MEDICINE

## 2022-05-23 PROCEDURE — 99213 OFFICE O/P EST LOW 20 MIN: CPT | Mod: S$GLB,TXP,, | Performed by: FAMILY MEDICINE

## 2022-05-23 PROCEDURE — 3052F HG A1C>EQUAL 8.0%<EQUAL 9.0%: CPT | Mod: CPTII,S$GLB,TXP, | Performed by: FAMILY MEDICINE

## 2022-05-23 PROCEDURE — S0119 PR ONDANSETRON, ORAL, 4MG: ICD-10-PCS | Mod: S$GLB,TXP,, | Performed by: FAMILY MEDICINE

## 2022-05-23 PROCEDURE — 99499 RISK ADDL DX/OHS AUDIT: ICD-10-PCS | Mod: S$GLB,TXP,, | Performed by: FAMILY MEDICINE

## 2022-05-23 PROCEDURE — S0119 ONDANSETRON 4 MG: HCPCS | Mod: S$GLB,TXP,, | Performed by: FAMILY MEDICINE

## 2022-05-23 PROCEDURE — 4010F ACE/ARB THERAPY RXD/TAKEN: CPT | Mod: CPTII,S$GLB,TXP, | Performed by: FAMILY MEDICINE

## 2022-05-23 PROCEDURE — 3077F PR MOST RECENT SYSTOLIC BLOOD PRESSURE >= 140 MM HG: ICD-10-PCS | Mod: CPTII,S$GLB,TXP, | Performed by: FAMILY MEDICINE

## 2022-05-23 PROCEDURE — 99213 PR OFFICE/OUTPT VISIT, EST, LEVL III, 20-29 MIN: ICD-10-PCS | Mod: S$GLB,TXP,, | Performed by: FAMILY MEDICINE

## 2022-05-23 PROCEDURE — 1159F PR MEDICATION LIST DOCUMENTED IN MEDICAL RECORD: ICD-10-PCS | Mod: CPTII,S$GLB,TXP, | Performed by: FAMILY MEDICINE

## 2022-05-23 PROCEDURE — 3008F PR BODY MASS INDEX (BMI) DOCUMENTED: ICD-10-PCS | Mod: CPTII,S$GLB,TXP, | Performed by: FAMILY MEDICINE

## 2022-05-23 PROCEDURE — 3079F PR MOST RECENT DIASTOLIC BLOOD PRESSURE 80-89 MM HG: ICD-10-PCS | Mod: CPTII,S$GLB,TXP, | Performed by: FAMILY MEDICINE

## 2022-05-23 PROCEDURE — 3066F PR DOCUMENTATION OF TREATMENT FOR NEPHROPATHY: ICD-10-PCS | Mod: CPTII,S$GLB,TXP, | Performed by: FAMILY MEDICINE

## 2022-05-23 RX ORDER — ACETAMINOPHEN 325 MG/1
325 TABLET ORAL
Status: COMPLETED | OUTPATIENT
Start: 2022-05-23 | End: 2022-05-23

## 2022-05-23 RX ORDER — ONDANSETRON 4 MG/1
4 TABLET, ORALLY DISINTEGRATING ORAL
Status: COMPLETED | OUTPATIENT
Start: 2022-05-23 | End: 2022-05-23

## 2022-05-23 RX ORDER — ROSUVASTATIN CALCIUM 10 MG/1
TABLET, COATED ORAL
COMMUNITY
Start: 2022-05-15 | End: 2022-08-22

## 2022-05-23 RX ADMIN — ACETAMINOPHEN 325 MG: 325 TABLET ORAL at 07:05

## 2022-05-23 RX ADMIN — ONDANSETRON 4 MG: 4 TABLET, ORALLY DISINTEGRATING ORAL at 07:05

## 2022-05-23 NOTE — PROGRESS NOTES
[facility]  Follow-up Visit Progress Note     PRESENTING HISTORY     PCP: Tavo Bhatia MD  Chief Complaint/Reason for Visit:  Follow up from recent visit.    No chief complaint on file.    History of Present Illness & ROS: Ms. Isabela Read is a 27 y.o. female.  Here today for Hospital follow up. Very pleasant young lady.   Noted to have been seen on 5/23 in , for Chills and Nausea with referral to ER and COVID being ruled out at that time...reportedly had the COVID test at Dr. Hernández's office and 'was negative'; however, the office did not document results it seems.. her symptoms have since resolved and is reportedly 'doing well'. She is scheduled to have labs and an appt today for her transplant workup.     No recurrent bleeding from left upper arm fistula site.     Review of Systems:  Eyes: denies visual changes at this time denies floaters   ENT: no nasal congestion or sore throat  Respiratory: no cough or shorness of breath  Cardiovascular: no chest pain or palpitations  Gastrointestinal: no nausea or vomiting, no abdominal pain or change in bowel habits  Genitourinary: no hematuria or dysuria; denies frequency  Hematologic/Lymphatic: no easy bruising or lymphadenopathy  Musculoskeletal: no arthralgias or myalgias  Neurological: no seizures or tremors  Endocrine: no heat or cold intolerance      PAST HISTORY:     Past Medical History:   Diagnosis Date    Anemia     Chronic hypertension with exacerbation during pregnancy in second trimester 11/6/2020    Current regimen (11/6/20):  - Carvedilol 12.5 mg BID - Nifedipine 30 mg daily Baseline CKD + proteinuria    Chronic kidney disease     Depression     Diabetes mellitus     Diabetic retinopathy     Diarrhea     Encounter for blood transfusion     Gastroparesis     Glaucoma     Hepatomegaly 4/29/2021    Hx of psychiatric care     Hyperlipidemia     Hypertension     Nephrotic syndrome     Nephrotic syndrome 3/4/2021     Palpitations     Poor fetal growth affecting management of mother in second trimester 10/15/2020    Restrictive lung disease     Retinal detachment     Severe pre-eclampsia in second trimester 11/6/2020       Past Surgical History:   Procedure Laterality Date    AV FISTULA PLACEMENT Left 4/7/2021    Procedure: CREATION, AV FISTULA;  Surgeon: Roberto Ryan MD;  Location: [facility] OR 2ND FLR;  Service: Peripheral Vascular;  Laterality: Left;    COLONOSCOPY N/A 3/16/2022    Procedure: COLONOSCOPY;  Surgeon: Tavo Kwok MD;  Location: [facility] (4TH FLR);  Service: Endoscopy;  Laterality: N/A;  Questionable history of delayed gastric emptying longstanding diabetes now on eating pre transplant workup for history of nausea vomiting which seems to have improved with dialysis also chronic diarrhea and history of anemia pre transplant workup for kidney transplant. 3    ESOPHAGOGASTRODUODENOSCOPY N/A 3/16/2022    Procedure: EGD (ESOPHAGOGASTRODUODENOSCOPY);  Surgeon: Tavo Kwok MD;  Location: [facility] (4TH FLR);  Service: Endoscopy;  Laterality: N/A;  Questionable history of delayed gastric emptying longstanding diabetes now on eating pre transplant workup for history of nausea vomiting which seems to have improved with dialysis also chronic diarrhea and history of anemia pre transplant workup for    FISTULOGRAM N/A 8/11/2021    Procedure: Fistulogram;  Surgeon: Roberto Ryan MD;  Location: [facility] CATH LAB;  Service: Cardiology;  Laterality: N/A;    PERCUTANEOUS TRANSLUMINAL ANGIOPLASTY OF ARTERIOVENOUS FISTULA N/A 8/11/2021    Procedure: PTA, AV FISTULA;  Surgeon: Roberto Ryan MD;  Location: [facility] CATH LAB;  Service: Cardiology;  Laterality: N/A;    REMOVAL IMPLANT, POSTERIOR SEGMENT, INTRAOCULAR Right 2/1/2022    Procedure: REMOVAL IMPLANT, POSTERIOR SEGMENT, INTRAOCULAR;  Surgeon: Maximilian Montaño MD;  Location: [facility] OR 1ST FLR;  Service: Ophthalmology;  Laterality: Right;     REPAIR OF RETINAL DETACHMENT WITH VITRECTOMY Right 1/25/2022    Procedure: REPAIR, RETINAL DETACHMENT, WITH VITRECTOMY, MEMBRANE PEEL, LASER, INJECTION OF GAS VS OIL;  Surgeon: Maximilian Montaño MD;  Location: [facility] OR 1ST FLR;  Service: Ophthalmology;  Laterality: Right;    REVISION OF ARTERIOVENOUS FISTULA Left 12/10/2021    Procedure: REVISION, AV FISTULA with BVT;  Surgeon: Roberto Ryan MD;  Location: [facility] OR 2ND FLR;  Service: Peripheral Vascular;  Laterality: Left;    VITRECTOMY BY PARS PLANA APPROACH Right 2/1/2022    Procedure: VITRECTOMY, PARS PLANA APPROACH;  Surgeon: Maximilian Montaño MD;  Location: [facility] OR 1ST FLR;  Service: Ophthalmology;  Laterality: Right;       Family History   Problem Relation Age of Onset    Hypertension Mother     Heart disease Father     Diabetes Brother     Celiac disease Neg Hx     Cirrhosis Neg Hx     Colon cancer Neg Hx     Colon polyps Neg Hx     Crohn's disease Neg Hx     Inflammatory bowel disease Neg Hx     Liver cancer Neg Hx     Liver disease Neg Hx     Rectal cancer Neg Hx     Stomach cancer Neg Hx     Ulcerative colitis Neg Hx     Esophageal cancer Neg Hx     Hemochromatosis Neg Hx     Pancreatic cancer Neg Hx     Kidney cancer Neg Hx     Bladder Cancer Neg Hx     Uterine cancer Neg Hx     Ovarian cancer Neg Hx        Social History     Socioeconomic History    Marital status: Single   Occupational History    Occupation:  at [employer]   Tobacco Use    Smoking status: Never Smoker    Smokeless tobacco: Never Used   Substance and Sexual Activity    Alcohol use: No    Drug use: No    Sexual activity: Not Currently     Partners: Male     Comment: monogamous   Social History Narrative    Caregiver        Single    No kids    Had Miscarriage  At 23 weeks from preeclampsia    Disabled       MEDICATIONS & ALLERGIES:     Current Outpatient Medications on File Prior to Visit   Medication Sig Dispense Refill    aspirin (ECOTRIN)  "81 MG EC tablet Take 81 mg by mouth once daily.      blood sugar diagnostic Strp To check BG 4 - 6 times daily, to use with insurance preferred meter 150 each 11    calcitRIOL (ROCALTROL) 0.5 MCG Cap Take 0.5 mcg by mouth once daily.      carvediloL (COREG) 25 MG tablet Take 25 mg by mouth 2 (two) times daily with meals.      flash glucose scanning reader (FREESTYLE MARCY 14 DAY READER) Misc 1 each by Misc.(Non-Drug; Combo Route) route once daily. 1 each 0    flash glucose sensor (FREESTYLE MARCY 14 DAY SENSOR) Kit 6 kits by Misc.(Non-Drug; Combo Route) route once daily. 6 kit 3    fluticasone propionate (FLONASE) 50 mcg/actuation nasal spray 1 spray by Each Nostril route daily as needed for Rhinitis or Allergies.      hydrALAZINE (APRESOLINE) 25 MG tablet Take 2 tablets (50 mg total) by mouth every 8 (eight) hours. 90 tablet 3    insulin detemir U-100 (LEVEMIR FLEXTOUCH) 100 unit/mL (3 mL) SubQ InPn pen 10 units subcutaneously in the morning and 12 units sq at night (Patient taking differently: Inject 10 units subcutaneously in the morning and 12 units sq at night) 15 mL 6    insulin lispro (HUMALOG KWIKPEN INSULIN) 100 unit/mL pen Inject 10 units into skin the skin 3 three times daily with meals 15 mL 6    lancets Misc To check BG 4 - 6 times daily, to use with insurance preferred meter 150 each 11    multivit-min/folic acid/biotin (HAIR,SKIN AND NAILS,FA-BIOTIN, ORAL) Take 1 tablet by mouth once daily.      NIFEdipine (PROCARDIA-XL) 60 MG (OSM) 24 hr tablet Take 60 mg by mouth 2 (two) times a day.      pen needle, diabetic 32 gauge x 5/32" Ndle For three times daily injection 100 each 11    sevelamer carbonate (RENVELA) 800 mg Tab TAKE 1 TABLET BY MOUTH THREE TIMES DAILY WITH MEALS 90 tablet 0    traZODone (DESYREL) 50 MG tablet Take 1 tablet (50 mg total) by mouth nightly as needed for Insomnia. 30 tablet 1    valsartan (DIOVAN) 160 MG tablet Take 1 tablet (160 mg total) by mouth once daily. Hold " off on taking until told to restart by a physician.      venlafaxine (EFFEXOR XR) 37.5 MG 24 hr capsule Take 1 capsule (37.5 mg total) by mouth once daily. 30 capsule 11     Current Facility-Administered Medications on File Prior to Visit   Medication Dose Route Frequency Provider Last Rate Last Admin    0.9%  NaCl infusion   Intravenous Continuous Tavo Becker MD 25 mL/hr at 12/10/21 1043 New Bag at 12/10/21 1043        Review of patient's allergies indicates:  No Known Allergies    Medications Reconciliation:   I have reconciled the patient's home medications and discharge medications with the patient/family. I have updated all changes.  Refer to After-Visit Medication List.    OBJECTIVE:     Vital Signs:  There were no vitals filed for this visit.  Wt Readings from Last 3 Encounters:   05/17/22 2200 61.6 kg (135 lb 12.9 oz)   05/08/22 0759 61.6 kg (135 lb 12.9 oz)   03/29/22 1451 63.9 kg (140 lb 14 oz)     There is no height or weight on file to calculate BMI.   Wt Readings from Last 3 Encounters:   05/25/22 63.4 kg (139 lb 12.4 oz)   05/23/22 60 kg (132 lb 4.4 oz)   05/17/22 61.6 kg (135 lb 12.9 oz)     Temp Readings from Last 3 Encounters:   05/23/22 98.2 °F (36.8 °C) (Oral)   05/19/22 97.9 °F (36.6 °C)   05/10/22 97.8 °F (36.6 °C) (Oral)     BP Readings from Last 3 Encounters:   05/25/22 138/84   05/23/22 (!) 142/87   05/19/22 (!) 152/85     Pulse Readings from Last 3 Encounters:   05/25/22 85   05/23/22 88   05/19/22 89       Physical Exam:  General: Well developed, well nourished. No distress.  HEENT: Head is normocephalic, atraumatic  Eyes: Clear conjunctiva.  Neck: Supple, symmetrical neck; trachea midline.  Lungs: Clear to auscultation bilaterally and normal respiratory effort.  Cardiovascular: Heart with regular rate and rhythm. No murmurs, gallops or rubs  Extremities: No LE edema. Pulses 2+ and symmetric.   LUE: Fistula with dressing; removed for provider to view today; no appreciable 'bleeding'  from the site.   Skin: Skin color, texture, turgor normal. No rashes.  Musculoskeletal: Normal gait.   Neurologic:No focal numbness or weakness.   Psychiatric: Not depressed.        Laboratory  Lab Results   Component Value Date    WBC 5.11 05/19/2022    HGB 8.7 (L) 05/19/2022    HCT 26.5 (L) 05/19/2022     05/19/2022    CHOL 135 10/11/2021    TRIG 36 10/11/2021    HDL 71 10/11/2021    ALT 9 (L) 05/10/2022    AST 12 05/10/2022     05/19/2022    K 5.2 (H) 05/19/2022     05/19/2022    CREATININE 6.9 (H) 05/19/2022    BUN 48 (H) 05/19/2022    CO2 18 (L) 05/19/2022    TSH 3.786 08/04/2020    INR 1.2 05/08/2022    GLUF 185 (H) 08/05/2020    HGBA1C 8.3 (H) 05/08/2022       ASSESSMENT & PLAN:     Hospital discharge follow-up    Recurrent major depressive disorder, in partial remission    Anxiety    ESRD (end stage renal disease)    End stage renal failure on dialysis    Dependence on renal dialysis    Type 1 diabetes mellitus with end-stage renal disease (ESRD)    Secondary hyperparathyroidism    Hyperkalemia      Recent admission for 'bleeding Fistula', no interventions required during admission  *Resolved     Recent admission with Hyperkalemia (7.7):   ESRD, HD Dependent.  *Resolved with HD session during admission    Fever and Chills post discharge:   *Resolved prior to being seen today    Other medical issues:   DM I:   ` followed by primary and Endocrine (Dr. Mayo)  ` Lispro 10/12  ` Aspart 10/10/10    ESRD:   Tues, Thurs and Sat  [facility]: [facility] Gauthier   ` followed by Nephrology (Dr. Aguilar)  ` Calcitrol   ` Renvela    Renovascular HTN:   Today: 138/84 (acceptable control)  ` Hydralazine   ` Coreg  ` Procardia      Chronic Depressive Disorder:   ` Trazodone  ` Effexor XR    *Recommend PCP follow up in 3-4 weeks, sooner if indicated. Note shared.     Future Appointments   Date Time Provider Department Center   5/25/2022 12:30 PM LAB, APPOINTMENT [facility] LAB [facility]   5/25/2022  1:00 PM  "ECHO, [facility] ECHOSTR [facility]   5/25/2022  2:30 PM Florentin Esquivel MD [facility] HEARTTX [facility]   6/1/2022  9:30 AM Ancelmo Park MD [facility] OPHTHAL [facility]   6/9/2022  1:00 PM Maximilian Montaño MD [facility] OPHTHAL [facility]   7/26/2022 11:00 AM Luciana Mayo MD [facility] ENDODIA [facility]        Medication List          Accurate as of May 25, 2022 12:37 PM. If you have any questions, ask your nurse or doctor.            CHANGE how you take these medications    insulin detemir U-100 100 unit/mL (3 mL) Inpn pen  Commonly known as: Levemir FLEXTOUCH  10 units subcutaneously in the morning and 12 units sq at night  What changed: additional instructions        CONTINUE taking these medications    aspirin 81 MG EC tablet  Commonly known as: ECOTRIN     blood sugar diagnostic Strp  To check BG 4 - 6 times daily, to use with insurance preferred meter     calcitRIOL 0.5 MCG Cap  Commonly known as: ROCALTROL     carvediloL 25 MG tablet  Commonly known as: COREG     fluticasone propionate 50 mcg/actuation nasal spray  Commonly known as: FLONASE     FREESTYLE MARCY 14 DAY READER Misc  Generic drug: flash glucose scanning reader  1 each by Misc.(Non-Drug; Combo Route) route once daily.     FREESTYLE MARCY 14 DAY SENSOR Kit  Generic drug: flash glucose sensor  6 kits by Misc.(Non-Drug; Combo Route) route once daily.     HAIR,SKIN AND NAILS(FA-BIOTIN) ORAL     hydrALAZINE 25 MG tablet  Commonly known as: APRESOLINE  Take 2 tablets (50 mg total) by mouth every 8 (eight) hours.     insulin lispro 100 unit/mL pen  Commonly known as: HumaLOG KwikPen Insulin  Inject 10 units into skin the skin 3 three times daily with meals     lancets Misc  To check BG 4 - 6 times daily, to use with insurance preferred meter     NIFEdipine 60 MG (OSM) 24 hr tablet  Commonly known as: PROCARDIA-XL     pen needle, diabetic 32 gauge x 5/32" Ndle  For three times daily injection     rosuvastatin 10 MG tablet  Commonly known as: CRESTOR   "   sevelamer carbonate 800 mg Tab  Commonly known as: RENVELA  TAKE 1 TABLET BY MOUTH THREE TIMES DAILY WITH MEALS     sodium chloride 0.9% SolP 100 mL with iron sucrose 100 mg iron/5 mL Soln 100 mg     traZODone 50 MG tablet  Commonly known as: DESYREL  Take 1 tablet (50 mg total) by mouth nightly as needed for Insomnia.     valsartan 160 MG tablet  Commonly known as: DIOVAN  Take 1 tablet (160 mg total) by mouth once daily. Hold off on taking until told to restart by a physician.     venlafaxine 37.5 MG 24 hr capsule  Commonly known as: EFFEXOR XR  Take 1 capsule (37.5 mg total) by mouth once daily.              Signing Physician:  GOYO Carrington

## 2022-05-23 NOTE — PROGRESS NOTES
"Subjective:       Patient ID: Isabela Read is a 27 y.o. female.    Vitals:  height is 5' 4" (1.626 m) and weight is 60 kg (132 lb 4.4 oz). Her oral temperature is 98.2 °F (36.8 °C). Her blood pressure is 142/87 (abnormal) and her pulse is 88. Her respiration is 18 and oxygen saturation is 100%.     Chief Complaint: Chills    This very  pleasant  28 yo black female who appears older than her stated age presents for complaints of chills, body aches and vomiting today.   She has multiple medical problems including Type 1 DM and end stage renal disease on dialysis and restrictive lung disease. She has been in and out of the hospital twice in the last 3 weeks for fluid overload after dialysis (with heart failure and hypertensive crisis) and then for iron deficiency and renal disease  Anemia.  She had dialysis last on Saturday and states she is aware she has a little fluid in her legs but denies kervin cough or chest pain or SOB.   She denies any sick contacts.   Patient is COVID vaccinated x2 . She denies significant cough. She has been checking her BS today and they have been rising some([id number] but despite the fact that she has dino eaten a tangerine today her BS has risen to over 200).     Emesis   This is a new problem. The current episode started today. The problem has been unchanged. There has been no fever. Associated symptoms include sweats. Pertinent negatives include no abdominal pain, arthralgias, chest pain, chills, coughing, decreased urine volume, diarrhea, dizziness, fever, headaches, myalgias, URI or weight loss. She has tried nothing for the symptoms.       Constitution: Negative for chills and fever.   Cardiovascular: Negative for chest pain.   Respiratory: Negative for cough.    Gastrointestinal: Positive for vomiting. Negative for abdominal pain and diarrhea.   Genitourinary: Negative for urine decreased.   Musculoskeletal: Negative for joint pain and muscle ache.   Neurological: Negative " for dizziness and headaches.       Objective:      Physical Exam   Constitutional: She is oriented to person, place, and time. She appears ill.   HENT:   Head: Normocephalic and atraumatic.   Ears:   Right Ear: Tympanic membrane, external ear and ear canal normal.   Left Ear: Tympanic membrane, external ear and ear canal normal.   Nose: Nose normal.   Mouth/Throat: Mucous membranes are moist. Oropharynx is clear.   Eyes: Pupils are equal, round, and reactive to light. Extraocular movement intact   Cardiovascular: Normal rate and regular rhythm.   Pulmonary/Chest: Effort normal and breath sounds normal.   Abdominal: She exhibits no distension. Soft. There is no abdominal tenderness. There is no left CVA tenderness and no right CVA tenderness.   Musculoskeletal:      Right lower leg: Edema (1-2) present.      Left lower leg: Edema (1-2) present.   Neurological: no focal deficit. She is alert and oriented to person, place, and time.   Skin: Skin is warm and dry.   Psychiatric: Her behavior is normal. Judgment and thought content normal.   Nursing note and vitals reviewed.        Assessment:       1. Chills (without fever) -possible sepsis   2. Chills    3. Non-intractable vomiting with nausea, unspecified vomiting type    4. Anemia due to chronic kidney disease, on chronic dialysis    5. Type 1 diabetes mellitus with end-stage renal disease (ESRD)          Plan:         Chills (without fever)  -     POCT COVID-19 Rapid Screening  -     acetaminophen tablet 325 mg    Non-intractable vomiting with nausea, unspecified vomiting type  -     ondansetron disintegrating tablet 4 mg  -     Refer to Emergency Dept.    Anemia due to chronic kidney disease, on chronic dialysis  -     Refer to Emergency Dept.    Type 1 diabetes mellitus with end-stage renal disease (ESRD)  -     Refer to Emergency Dept.    Nephrotic syndrome    End stage renal failure on dialysis    I recommend pt proceed to the ER for further evaluation with labs  and imaging as indicated. Pt agrees to do that.

## 2022-05-25 ENCOUNTER — HOSPITAL ENCOUNTER (OUTPATIENT)
Dept: CARDIOLOGY | Facility: HOSPITAL | Age: 27
Discharge: HOME | End: 2022-05-25
Attending: INTERNAL MEDICINE
Payer: MEDICARE

## 2022-05-25 ENCOUNTER — OFFICE VISIT (OUTPATIENT)
Dept: INTERNAL MEDICINE | Facility: CLINIC | Age: 27
End: 2022-05-25
Payer: MEDICARE

## 2022-05-25 ENCOUNTER — OFFICE VISIT (OUTPATIENT)
Dept: TRANSPLANT | Facility: CLINIC | Age: 27
End: 2022-05-25
Payer: MEDICAID

## 2022-05-25 VITALS
DIASTOLIC BLOOD PRESSURE: 80 MMHG | SYSTOLIC BLOOD PRESSURE: 120 MMHG | WEIGHT: 135 LBS | HEART RATE: 68 BPM | HEIGHT: 64 IN | BODY MASS INDEX: 23.05 KG/M2

## 2022-05-25 VITALS
HEIGHT: 64 IN | HEART RATE: 85 BPM | WEIGHT: 139.75 LBS | BODY MASS INDEX: 23.86 KG/M2 | SYSTOLIC BLOOD PRESSURE: 138 MMHG | DIASTOLIC BLOOD PRESSURE: 84 MMHG | OXYGEN SATURATION: 100 %

## 2022-05-25 VITALS
HEIGHT: 64 IN | HEART RATE: 81 BPM | BODY MASS INDEX: 23.86 KG/M2 | WEIGHT: 139.75 LBS | DIASTOLIC BLOOD PRESSURE: 69 MMHG | SYSTOLIC BLOOD PRESSURE: 116 MMHG

## 2022-05-25 DIAGNOSIS — N25.81 SECONDARY HYPERPARATHYROIDISM: ICD-10-CM

## 2022-05-25 DIAGNOSIS — N18.5 CKD (CHRONIC KIDNEY DISEASE), STAGE V: Primary | ICD-10-CM

## 2022-05-25 DIAGNOSIS — N18.6 ESRD (END STAGE RENAL DISEASE): Chronic | ICD-10-CM

## 2022-05-25 DIAGNOSIS — I10 HYPERTENSION, ESSENTIAL: ICD-10-CM

## 2022-05-25 DIAGNOSIS — E78.2 MIXED HYPERLIPIDEMIA: ICD-10-CM

## 2022-05-25 DIAGNOSIS — F33.41 RECURRENT MAJOR DEPRESSIVE DISORDER, IN PARTIAL REMISSION: ICD-10-CM

## 2022-05-25 DIAGNOSIS — E10.43 TYPE 1 DIABETES MELLITUS WITH DIABETIC AUTONOMIC (POLY)NEUROPATHY: ICD-10-CM

## 2022-05-25 DIAGNOSIS — Z99.2 DEPENDENCE ON RENAL DIALYSIS: ICD-10-CM

## 2022-05-25 DIAGNOSIS — Z99.2 ESRD (END STAGE RENAL DISEASE) ON DIALYSIS: ICD-10-CM

## 2022-05-25 DIAGNOSIS — N18.6 END STAGE RENAL FAILURE ON DIALYSIS: ICD-10-CM

## 2022-05-25 DIAGNOSIS — Z99.2 END STAGE RENAL FAILURE ON DIALYSIS: ICD-10-CM

## 2022-05-25 DIAGNOSIS — I12.0 HYPERTENSIVE CHRONIC KIDNEY DISEASE WITH STAGE 5 CHRONIC KIDNEY DISEASE OR END STAGE RENAL DISEASE: ICD-10-CM

## 2022-05-25 DIAGNOSIS — F41.9 ANXIETY: Chronic | ICD-10-CM

## 2022-05-25 DIAGNOSIS — Z09 HOSPITAL DISCHARGE FOLLOW-UP: Primary | ICD-10-CM

## 2022-05-25 DIAGNOSIS — E10.22 TYPE 1 DIABETES MELLITUS WITH END-STAGE RENAL DISEASE (ESRD): Chronic | ICD-10-CM

## 2022-05-25 DIAGNOSIS — N18.6 TYPE 1 DIABETES MELLITUS WITH END-STAGE RENAL DISEASE (ESRD): Chronic | ICD-10-CM

## 2022-05-25 DIAGNOSIS — E87.5 HYPERKALEMIA: ICD-10-CM

## 2022-05-25 DIAGNOSIS — I50.30 HEART FAILURE WITH PRESERVED EJECTION FRACTION, UNSPECIFIED HF CHRONICITY: ICD-10-CM

## 2022-05-25 DIAGNOSIS — N18.6 ESRD (END STAGE RENAL DISEASE) ON DIALYSIS: ICD-10-CM

## 2022-05-25 LAB
ASCENDING AORTA: 2.38 CM
AV INDEX (PROSTH): 0.76
AV MEAN GRADIENT: 6 MMHG
AV PEAK GRADIENT: 12 MMHG
AV VALVE AREA: 1.94 CM2
AV VELOCITY RATIO: 0.72
BSA FOR ECHO PROCEDURE: 1.66 M2
CV ECHO LV RWT: 0.42 CM
DOP CALC AO PEAK VEL: 1.73 M/S
DOP CALC AO VTI: 34.43 CM
DOP CALC LVOT AREA: 2.5 CM2
DOP CALC LVOT DIAMETER: 1.8 CM
DOP CALC LVOT PEAK VEL: 1.25 M/S
DOP CALC LVOT STROKE VOLUME: 66.92 CM3
DOP CALCLVOT PEAK VEL VTI: 26.31 CM
E WAVE DECELERATION TIME: 185.98 MSEC
E/A RATIO: 1.98
E/E' RATIO: 14.8 M/S
ECHO LV POSTERIOR WALL: 1.04 CM (ref 0.6–1.1)
EJECTION FRACTION: 65 %
FRACTIONAL SHORTENING: 33 % (ref 28–44)
INTERVENTRICULAR SEPTUM: 1.1 CM (ref 0.6–1.1)
LA MAJOR: 5.07 CM
LA MINOR: 5.15 CM
LA WIDTH: 4.37 CM
LEFT ATRIUM SIZE: 3.87 CM
LEFT ATRIUM VOLUME INDEX MOD: 42.4 ML/M2
LEFT ATRIUM VOLUME INDEX: 44.2 ML/M2
LEFT ATRIUM VOLUME MOD: 70.46 CM3
LEFT ATRIUM VOLUME: 73.45 CM3
LEFT INTERNAL DIMENSION IN SYSTOLE: 3.3 CM (ref 2.1–4)
LEFT VENTRICLE DIASTOLIC VOLUME INDEX: 67.92 ML/M2
LEFT VENTRICLE DIASTOLIC VOLUME: 112.74 ML
LEFT VENTRICLE MASS INDEX: 116 G/M2
LEFT VENTRICLE SYSTOLIC VOLUME INDEX: 26.7 ML/M2
LEFT VENTRICLE SYSTOLIC VOLUME: 44.28 ML
LEFT VENTRICULAR INTERNAL DIMENSION IN DIASTOLE: 4.9 CM (ref 3.5–6)
LEFT VENTRICULAR MASS: 193.01 G
LV LATERAL E/E' RATIO: 11.1 M/S
LV SEPTAL E/E' RATIO: 22.2 M/S
MV A" WAVE DURATION": 11.7 MSEC
MV PEAK A VEL: 0.56 M/S
MV PEAK E VEL: 1.11 M/S
MV STENOSIS PRESSURE HALF TIME: 53.93 MS
MV VALVE AREA P 1/2 METHOD: 4.08 CM2
PISA TR MAX VEL: 2.45 M/S
PULM VEIN S/D RATIO: 0.76
PV PEAK D VEL: 0.85 M/S
PV PEAK S VEL: 0.65 M/S
RA MAJOR: 4.78 CM
RA PRESSURE: 3 MMHG
RA WIDTH: 2.61 CM
RIGHT VENTRICULAR END-DIASTOLIC DIMENSION: 3.86 CM
RV TISSUE DOPPLER FREE WALL SYSTOLIC VELOCITY 1 (APICAL 4 CHAMBER VIEW): 12.6 CM/S
SINUS: 2.17 CM
STJ: 2.03 CM
TDI LATERAL: 0.1 M/S
TDI SEPTAL: 0.05 M/S
TDI: 0.08 M/S
TR MAX PG: 24 MMHG
TRICUSPID ANNULAR PLANE SYSTOLIC EXCURSION: 2.19 CM
TV REST PULMONARY ARTERY PRESSURE: 27 MMHG

## 2022-05-25 PROCEDURE — 1159F MED LIST DOCD IN RCRD: CPT | Mod: CPTII,S$GLB,TXP, | Performed by: INTERNAL MEDICINE

## 2022-05-25 PROCEDURE — 99499 UNLISTED E&M SERVICE: CPT | Mod: S$GLB,,, | Performed by: NURSE PRACTITIONER

## 2022-05-25 PROCEDURE — 99999 PR PBB SHADOW E&M-EST. PATIENT-LVL V: CPT | Mod: PBBFAC,TXP,, | Performed by: INTERNAL MEDICINE

## 2022-05-25 PROCEDURE — 3075F SYST BP GE 130 - 139MM HG: CPT | Mod: CPTII,S$GLB,, | Performed by: NURSE PRACTITIONER

## 2022-05-25 PROCEDURE — 3074F SYST BP LT 130 MM HG: CPT | Mod: CPTII,S$GLB,TXP, | Performed by: INTERNAL MEDICINE

## 2022-05-25 PROCEDURE — 93306 TTE W/DOPPLER COMPLETE: CPT | Mod: 26,TXP,, | Performed by: INTERNAL MEDICINE

## 2022-05-25 PROCEDURE — 3079F PR MOST RECENT DIASTOLIC BLOOD PRESSURE 80-89 MM HG: ICD-10-PCS | Mod: CPTII,S$GLB,, | Performed by: NURSE PRACTITIONER

## 2022-05-25 PROCEDURE — 3079F DIAST BP 80-89 MM HG: CPT | Mod: CPTII,S$GLB,, | Performed by: NURSE PRACTITIONER

## 2022-05-25 PROCEDURE — 4010F PR ACE/ARB THEARPY RXD/TAKEN: ICD-10-PCS | Mod: CPTII,S$GLB,TXP, | Performed by: INTERNAL MEDICINE

## 2022-05-25 PROCEDURE — 3075F PR MOST RECENT SYSTOLIC BLOOD PRESS GE 130-139MM HG: ICD-10-PCS | Mod: CPTII,S$GLB,, | Performed by: NURSE PRACTITIONER

## 2022-05-25 PROCEDURE — 99999 PR PBB SHADOW E&M-EST. PATIENT-LVL V: ICD-10-PCS | Mod: PBBFAC,TXP,, | Performed by: INTERNAL MEDICINE

## 2022-05-25 PROCEDURE — 3008F BODY MASS INDEX DOCD: CPT | Mod: CPTII,S$GLB,, | Performed by: NURSE PRACTITIONER

## 2022-05-25 PROCEDURE — 3066F PR DOCUMENTATION OF TREATMENT FOR NEPHROPATHY: ICD-10-PCS | Mod: CPTII,S$GLB,TXP, | Performed by: INTERNAL MEDICINE

## 2022-05-25 PROCEDURE — 93306 ECHO (CUPID ONLY): ICD-10-PCS | Mod: 26,TXP,, | Performed by: INTERNAL MEDICINE

## 2022-05-25 PROCEDURE — 1111F DSCHRG MED/CURRENT MED MERGE: CPT | Mod: CPTII,S$GLB,, | Performed by: NURSE PRACTITIONER

## 2022-05-25 PROCEDURE — 99214 PR OFFICE/OUTPT VISIT, EST, LEVL IV, 30-39 MIN: ICD-10-PCS | Mod: S$GLB,,, | Performed by: NURSE PRACTITIONER

## 2022-05-25 PROCEDURE — 99214 PR OFFICE/OUTPT VISIT, EST, LEVL IV, 30-39 MIN: ICD-10-PCS | Mod: S$GLB,TXP,, | Performed by: INTERNAL MEDICINE

## 2022-05-25 PROCEDURE — 3008F BODY MASS INDEX DOCD: CPT | Mod: CPTII,S$GLB,TXP, | Performed by: INTERNAL MEDICINE

## 2022-05-25 PROCEDURE — 99499 RISK ADDL DX/OHS AUDIT: ICD-10-PCS | Mod: S$GLB,,, | Performed by: NURSE PRACTITIONER

## 2022-05-25 PROCEDURE — 93356 MYOCRD STRAIN IMG SPCKL TRCK: CPT | Mod: TXP

## 2022-05-25 PROCEDURE — 99214 OFFICE O/P EST MOD 30 MIN: CPT | Mod: S$GLB,TXP,, | Performed by: INTERNAL MEDICINE

## 2022-05-25 PROCEDURE — 3052F PR MOST RECENT HEMOGLOBIN A1C LEVEL 8.0 - < 9.0%: ICD-10-PCS | Mod: CPTII,S$GLB,TXP, | Performed by: INTERNAL MEDICINE

## 2022-05-25 PROCEDURE — 1111F PR DISCHARGE MEDS RECONCILED W/ CURRENT OUTPATIENT MED LIST: ICD-10-PCS | Mod: CPTII,S$GLB,TXP, | Performed by: INTERNAL MEDICINE

## 2022-05-25 PROCEDURE — 3008F PR BODY MASS INDEX (BMI) DOCUMENTED: ICD-10-PCS | Mod: CPTII,S$GLB,TXP, | Performed by: INTERNAL MEDICINE

## 2022-05-25 PROCEDURE — 99214 OFFICE O/P EST MOD 30 MIN: CPT | Mod: S$GLB,,, | Performed by: NURSE PRACTITIONER

## 2022-05-25 PROCEDURE — 3066F PR DOCUMENTATION OF TREATMENT FOR NEPHROPATHY: ICD-10-PCS | Mod: CPTII,S$GLB,, | Performed by: NURSE PRACTITIONER

## 2022-05-25 PROCEDURE — 3066F NEPHROPATHY DOC TX: CPT | Mod: CPTII,S$GLB,, | Performed by: NURSE PRACTITIONER

## 2022-05-25 PROCEDURE — 3078F PR MOST RECENT DIASTOLIC BLOOD PRESSURE < 80 MM HG: ICD-10-PCS | Mod: CPTII,S$GLB,TXP, | Performed by: INTERNAL MEDICINE

## 2022-05-25 PROCEDURE — 3066F NEPHROPATHY DOC TX: CPT | Mod: CPTII,S$GLB,TXP, | Performed by: INTERNAL MEDICINE

## 2022-05-25 PROCEDURE — 3074F PR MOST RECENT SYSTOLIC BLOOD PRESSURE < 130 MM HG: ICD-10-PCS | Mod: CPTII,S$GLB,TXP, | Performed by: INTERNAL MEDICINE

## 2022-05-25 PROCEDURE — 4010F ACE/ARB THERAPY RXD/TAKEN: CPT | Mod: CPTII,S$GLB,TXP, | Performed by: INTERNAL MEDICINE

## 2022-05-25 PROCEDURE — 3078F DIAST BP <80 MM HG: CPT | Mod: CPTII,S$GLB,TXP, | Performed by: INTERNAL MEDICINE

## 2022-05-25 PROCEDURE — 3052F HG A1C>EQUAL 8.0%<EQUAL 9.0%: CPT | Mod: CPTII,S$GLB,TXP, | Performed by: INTERNAL MEDICINE

## 2022-05-25 PROCEDURE — 93356 ECHO (CUPID ONLY): ICD-10-PCS | Mod: TXP,,, | Performed by: INTERNAL MEDICINE

## 2022-05-25 PROCEDURE — 1159F PR MEDICATION LIST DOCUMENTED IN MEDICAL RECORD: ICD-10-PCS | Mod: CPTII,S$GLB,, | Performed by: NURSE PRACTITIONER

## 2022-05-25 PROCEDURE — 1159F MED LIST DOCD IN RCRD: CPT | Mod: CPTII,S$GLB,, | Performed by: NURSE PRACTITIONER

## 2022-05-25 PROCEDURE — 99499 UNLISTED E&M SERVICE: CPT | Mod: S$GLB,,, | Performed by: INTERNAL MEDICINE

## 2022-05-25 PROCEDURE — 99215 OFFICE O/P EST HI 40 MIN: CPT | Mod: PBBFAC,25,TXP | Performed by: INTERNAL MEDICINE

## 2022-05-25 PROCEDURE — 1159F PR MEDICATION LIST DOCUMENTED IN MEDICAL RECORD: ICD-10-PCS | Mod: CPTII,S$GLB,TXP, | Performed by: INTERNAL MEDICINE

## 2022-05-25 PROCEDURE — 99999 PR PBB SHADOW E&M-EST. PATIENT-LVL IV: ICD-10-PCS | Mod: PBBFAC,,, | Performed by: NURSE PRACTITIONER

## 2022-05-25 PROCEDURE — 1111F PR DISCHARGE MEDS RECONCILED W/ CURRENT OUTPATIENT MED LIST: ICD-10-PCS | Mod: CPTII,S$GLB,, | Performed by: NURSE PRACTITIONER

## 2022-05-25 PROCEDURE — 3052F PR MOST RECENT HEMOGLOBIN A1C LEVEL 8.0 - < 9.0%: ICD-10-PCS | Mod: CPTII,S$GLB,, | Performed by: NURSE PRACTITIONER

## 2022-05-25 PROCEDURE — 93356 MYOCRD STRAIN IMG SPCKL TRCK: CPT | Mod: TXP,,, | Performed by: INTERNAL MEDICINE

## 2022-05-25 PROCEDURE — 4010F PR ACE/ARB THEARPY RXD/TAKEN: ICD-10-PCS | Mod: CPTII,S$GLB,, | Performed by: NURSE PRACTITIONER

## 2022-05-25 PROCEDURE — 4010F ACE/ARB THERAPY RXD/TAKEN: CPT | Mod: CPTII,S$GLB,, | Performed by: NURSE PRACTITIONER

## 2022-05-25 PROCEDURE — 3052F HG A1C>EQUAL 8.0%<EQUAL 9.0%: CPT | Mod: CPTII,S$GLB,, | Performed by: NURSE PRACTITIONER

## 2022-05-25 PROCEDURE — 99499 RISK ADDL DX/OHS AUDIT: ICD-10-PCS | Mod: S$GLB,,, | Performed by: INTERNAL MEDICINE

## 2022-05-25 PROCEDURE — 3008F PR BODY MASS INDEX (BMI) DOCUMENTED: ICD-10-PCS | Mod: CPTII,S$GLB,, | Performed by: NURSE PRACTITIONER

## 2022-05-25 PROCEDURE — 99999 PR PBB SHADOW E&M-EST. PATIENT-LVL IV: CPT | Mod: PBBFAC,,, | Performed by: NURSE PRACTITIONER

## 2022-05-25 PROCEDURE — 1111F DSCHRG MED/CURRENT MED MERGE: CPT | Mod: CPTII,S$GLB,TXP, | Performed by: INTERNAL MEDICINE

## 2022-05-25 NOTE — PATIENT INSTRUCTIONS
You have extra fluid on you.  Please adhere to a low sodium diet (no more than 1.5 grams of sodium in 24h).  3.   Follow fluid restriction of  2. no more than 1.5 liters in 24 hours..   4.  Please have your Kidney team to remove more fluid.  5. You have been referred to hematology for formal interpretation of results of w/u for AL amyloidosis.  6. F/u as needed.

## 2022-05-25 NOTE — PROGRESS NOTES
[facility] Follow up.      Attending Physician: Florentin Esquivel MD.  The patient's last visit with me was on 3/21/2022.         HPI.  This is a 26 yo AAW with history of type 1 DM and HTN< that developed ESRD after years and as a complication. She has been on HD using a left arm AV fistula, on Tuesday-Thursday and Sat.  She comes referred for evalutaion before possible kidney transplant.  Patient complains of dyspnea with less than ordinary activities. Class III symptoms.     Review of Systems   Constitutional: Negative for chills, diaphoresis and fever.   HENT: Negative for nasal congestion, rhinorrhea and sore throat.    Eyes: Negative for visual disturbance.   Cardiovascular: Negative for chest pain.   Gastrointestinal: Negative for abdominal pain, diarrhea, nausea and vomiting.   Genitourinary: Negative for difficulty urinating, dysuria and hematuria.   Integumentary:  Negative for rash.   Neurological: Negative for seizures, syncope and light-headedness.   Psychiatric/Behavioral: Negative for agitation and hallucinations.        Past Medical History:   Diagnosis Date    Anemia     Chronic hypertension with exacerbation during pregnancy in second trimester 11/6/2020    Current regimen (11/6/20):  - Carvedilol 12.5 mg BID - Nifedipine 30 mg daily Baseline CKD + proteinuria    Chronic kidney disease     Depression     Diabetes mellitus     Diabetic retinopathy     Diarrhea     Encounter for blood transfusion     Gastroparesis     Glaucoma     Hepatomegaly 4/29/2021    Hx of psychiatric care     Hyperlipidemia     Hypertension     Nephrotic syndrome     Nephrotic syndrome 3/4/2021    Palpitations     Poor fetal growth affecting management of mother in second trimester 10/15/2020    Restrictive lung disease     Retinal detachment     Severe pre-eclampsia in second  trimester 11/6/2020        Past Surgical History:   Procedure Laterality Date    AV FISTULA PLACEMENT Left 4/7/2021    Procedure: CREATION, AV FISTULA;  Surgeon: Roberto Ryan MD;  Location: [facility] OR 2ND FLR;  Service: Peripheral Vascular;  Laterality: Left;    COLONOSCOPY N/A 3/16/2022    Procedure: COLONOSCOPY;  Surgeon: Tavo Kwok MD;  Location: [facility] ENDO (4TH FLR);  Service: Endoscopy;  Laterality: N/A;  Questionable history of delayed gastric emptying longstanding diabetes now on eating pre transplant workup for history of nausea vomiting which seems to have improved with dialysis also chronic diarrhea and history of anemia pre transplant workup for kidney transplant. 3    ESOPHAGOGASTRODUODENOSCOPY N/A 3/16/2022    Procedure: EGD (ESOPHAGOGASTRODUODENOSCOPY);  Surgeon: Tavo Kwok MD;  Location: [facility] ENDO (4TH FLR);  Service: Endoscopy;  Laterality: N/A;  Questionable history of delayed gastric emptying longstanding diabetes now on eating pre transplant workup for history of nausea vomiting which seems to have improved with dialysis also chronic diarrhea and history of anemia pre transplant workup for    FISTULOGRAM N/A 8/11/2021    Procedure: Fistulogram;  Surgeon: Roberto Ryan MD;  Location: [facility] CATH LAB;  Service: Cardiology;  Laterality: N/A;    PERCUTANEOUS TRANSLUMINAL ANGIOPLASTY OF ARTERIOVENOUS FISTULA N/A 8/11/2021    Procedure: PTA, AV FISTULA;  Surgeon: Roberto Ryan MD;  Location: [facility] CATH LAB;  Service: Cardiology;  Laterality: N/A;    REMOVAL IMPLANT, POSTERIOR SEGMENT, INTRAOCULAR Right 2/1/2022    Procedure: REMOVAL IMPLANT, POSTERIOR SEGMENT, INTRAOCULAR;  Surgeon: Maximilian Montaño MD;  Location: [facility] OR 1ST FLR;  Service: Ophthalmology;  Laterality: Right;    REPAIR OF RETINAL DETACHMENT WITH VITRECTOMY Right 1/25/2022    Procedure: REPAIR, RETINAL DETACHMENT, WITH VITRECTOMY, MEMBRANE PEEL, LASER, INJECTION OF GAS VS OIL;  Surgeon: Maximilian  RODOLFO Montaño MD;  Location: [facility] OR 1ST FLR;  Service: Ophthalmology;  Laterality: Right;    REVISION OF ARTERIOVENOUS FISTULA Left 12/10/2021    Procedure: REVISION, AV FISTULA with BVT;  Surgeon: Roberto Ryan MD;  Location: [facility] OR 2ND FLR;  Service: Peripheral Vascular;  Laterality: Left;    VITRECTOMY BY PARS PLANA APPROACH Right 2/1/2022    Procedure: VITRECTOMY, PARS PLANA APPROACH;  Surgeon: Maximilian Montaño MD;  Location: [facility] OR 1ST FLR;  Service: Ophthalmology;  Laterality: Right;        Family History   Problem Relation Age of Onset    Hypertension Mother     Heart disease Father     Diabetes Brother     Celiac disease Neg Hx     Cirrhosis Neg Hx     Colon cancer Neg Hx     Colon polyps Neg Hx     Crohn's disease Neg Hx     Inflammatory bowel disease Neg Hx     Liver cancer Neg Hx     Liver disease Neg Hx     Rectal cancer Neg Hx     Stomach cancer Neg Hx     Ulcerative colitis Neg Hx     Esophageal cancer Neg Hx     Hemochromatosis Neg Hx     Pancreatic cancer Neg Hx     Kidney cancer Neg Hx     Bladder Cancer Neg Hx     Uterine cancer Neg Hx     Ovarian cancer Neg Hx         Review of patient's allergies indicates:  No Known Allergies     Current Outpatient Medications   Medication Instructions    aspirin (ECOTRIN) 81 mg, Oral, Daily    blood sugar diagnostic Strp To check BG 4 - 6 times daily, to use with insurance preferred meter    calcitRIOL (ROCALTROL) 0.5 mcg, Oral, Daily    carvediloL (COREG) 25 mg, Oral, 2 times daily with meals    flash glucose scanning reader (FREESTYLE MARCY 14 DAY READER) Misc 1 each, Misc.(Non-Drug; Combo Route), Daily    flash glucose sensor (FREESTYLE MARCY 14 DAY SENSOR) Kit 6 kits, Misc.(Non-Drug; Combo Route), Daily    fluticasone propionate (FLONASE) 50 mcg/actuation nasal spray 1 spray, Each Nostril, Daily PRN    hydrALAZINE (APRESOLINE) 50 mg, Oral, Every 8 hours    insulin detemir U-100 (LEVEMIR FLEXTOUCH) 100 unit/mL (3  "mL) SubQ InPn pen 10 units subcutaneously in the morning and 12 units sq at night    insulin lispro (HUMALOG KWIKPEN INSULIN) 100 unit/mL pen Inject 10 units into skin the skin 3 three times daily with meals    lancets Misc To check BG 4 - 6 times daily, to use with insurance preferred meter    multivit-min/folic acid/biotin (HAIR,SKIN AND NAILS,FA-BIOTIN, ORAL) 1 tablet, Oral, Daily    NIFEdipine (PROCARDIA-XL) 60 mg, Oral, 2 times daily    pen needle, diabetic 32 gauge x 5/32" Ndle For three times daily injection    rosuvastatin (CRESTOR) 10 MG tablet SMARTSI Tablet(s) By Mouth Every Evening    sevelamer carbonate (RENVELA) 800 mg Tab TAKE 1 TABLET BY MOUTH THREE TIMES DAILY WITH MEALS    traZODone (DESYREL) 50 mg, Oral, Nightly PRN    venlafaxine (EFFEXOR XR) 37.5 mg, Oral, Daily        Vitals:    22 1441   BP: 116/69   Pulse: 81        Wt Readings from Last 3 Encounters:   22 63.4 kg (139 lb 12.4 oz)   22 61.2 kg (135 lb)   22 63.4 kg (139 lb 12.4 oz)     Temp Readings from Last 3 Encounters:   22 98.2 °F (36.8 °C) (Oral)   22 97.9 °F (36.6 °C)   05/10/22 97.8 °F (36.6 °C) (Oral)     BP Readings from Last 3 Encounters:   22 116/69   22 120/80   22 138/84     Pulse Readings from Last 3 Encounters:   22 81   22 68   22 85        Body mass index is 23.99 kg/m². Estimated body surface area is 1.69 meters squared as calculated from the following:    Height as of this encounter: 5' 4" (1.626 m).    Weight as of this encounter: 63.4 kg (139 lb 12.4 oz).     Physical Exam  Constitutional:       Appearance: She is well-developed.   HENT:      Head: Normocephalic and atraumatic.      Right Ear: External ear normal.      Left Ear: External ear normal.   Eyes:      Conjunctiva/sclera: Conjunctivae normal.      Pupils: Pupils are equal, round, and reactive to light.   Neck:      Vascular: Hepatojugular reflux and JVD present.      Comments: " JVP 16 cmH20 best seen in the left side of her neck.  Cardiovascular:      Rate and Rhythm: Normal rate and regular rhythm.      Pulses: Intact distal pulses.      Heart sounds: S1 normal and S2 normal. No murmur heard.    No friction rub. No gallop.   Pulmonary:      Effort: Pulmonary effort is normal.      Breath sounds: Normal breath sounds.   Abdominal:      General: Bowel sounds are normal. There is no distension.      Palpations: Abdomen is soft.      Tenderness: There is no abdominal tenderness. There is no guarding or rebound.   Musculoskeletal:      Cervical back: Normal range of motion and neck supple.      Right lower leg: No edema.      Left lower leg: No edema.   Neurological:      Mental Status: She is alert and oriented to person, place, and time.          Lab Results   Component Value Date     (H) 05/25/2022     05/25/2022    K 5.3 (H) 05/25/2022    MG 2.4 05/10/2022    CL 94 (L) 05/25/2022    CO2 30 (H) 05/25/2022    BUN 33 (H) 05/25/2022    CREATININE 5.9 (H) 05/25/2022     (H) 05/25/2022    HGBA1C 8.3 (H) 05/08/2022    AST 18 05/25/2022    ALT 21 05/25/2022    ALBUMIN 3.6 05/25/2022    PROT 7.3 05/25/2022    BILITOT 0.9 05/25/2022    WBC 4.39 05/25/2022    HGB 9.1 (L) 05/25/2022    HCT 28.7 (L) 05/25/2022    HCT 33 (L) 05/17/2022     05/25/2022    INR 1.2 05/08/2022     10/21/2019    TSH 3.786 08/04/2020    CHOL 135 10/11/2021    HDL 71 10/11/2021    LDLCALC 56.8 (L) 10/11/2021    TRIG 36 10/11/2021           Results for orders placed during the hospital encounter of 05/25/22    Echo    Interpretation Summary  · Moderate left atrial enlargement.  · The left ventricle is normal in size with mild eccentric hypertrophy and normal systolic function.  · The estimated ejection fraction is 65%.  · Grade II left ventricular diastolic dysfunction.  · The left ventricular global longitudinal strain is reduced measuring -14%, with preservation at the apex.  · Normal right  ventricular size with normal right ventricular systolic function.  · Mild mitral regurgitation.  · Mild pulmonic regurgitation.  · Normal central venous pressure (3 mmHg).  · The estimated PA systolic pressure is 27 mmHg.  · Trivial posterior pericardial effusion.        Results for orders placed during the hospital encounter of 08/11/21    Cardiac catheterization    Narrative  Procedure performed in the Invasive Lab  - See Procedure Log link below for nursing documentation  - See OpNote on Surgeries Tab for physician findings         Assessment and Plan:  CKD (chronic kidney disease), stage V  -     Ambulatory consult to Hematology / Oncology; Future; Expected date: 06/01/2022    Hypertension, essential    Mixed hyperlipidemia    End stage renal failure on dialysis    ESRD (end stage renal disease)    Hypertensive chronic kidney disease with stage 5 chronic kidney disease or end stage renal disease    Heart failure with preserved ejection fraction, unspecified HF chronicity    Type 1 diabetes mellitus with diabetic autonomic (poly)neuropathy          1. Chronic diastolic HF, NYHA class III, stage C.  Etiology: hypertension, diabetes, CKD.  Devices: none  Medications: hydralazine, carvedilol. Nifedipine ER.  Hemodynamic status: warm, normotensive, centrally hypervolemic, with high JVP on exam. I suspect her echo report of IVC today is underestimating her true volume status. Her BNP today is 942. Very high.  Plan:  -patient to adhere to a fluid restriction of NMT 1.5 liters/24h.  -patient to adhere to  low sodium diet, NMT 1.5 grams of sodium in a day.  -patient needs aggressive decongestion through HD, till real DRY weight is achieved and maintained.   -work up for amyloidosis is negative. Normal wall thickness, normal voltage and no conduction abnormalities on ECG. Negative PYP scan.      2. Cardiac risk for non cardiac surgery (kidney transplant).  Patient has diastolic HF secondary to years of multiple risk  factors and ESRD. Negative stress test last year for ischemia.  No additional cardiac testing is recommended.   I recommend optimization of her volume status through HD. Then proceed with surgery as planned.    3. Polyclonal elevation of light chains with normal ratio.  Suspected due to inflammation. Will refer patient to Hem to comment of pattern of light chain elevation and probability of blood cell malignancy.      prn follow up.

## 2022-05-25 NOTE — LETTER
May 25, 2022        Oswald Kruger  [address]  [facility] [address]  Phone: [phone]  Fax: [phone]             [id number]  [address]  [facility] [address]  Phone: [phone]   Patient: Isabela Read   MR Number: [id number]   YOB: 1995   Date of Visit: 5/25/2022       Dear Dr. Oswald Kruger    Thank you for referring Isabela Read to me for evaluation. Attached you will find relevant portions of my assessment and plan of care.    If you have questions, please do not hesitate to call me. I look forward to following Isabela Read along with you.    Sincerely,    Florentin Esquivel MD    Enclosure    If you would like to receive this communication electronically, please contact [email or web address] or [phone] to request [employer] Link access.    [employer] Link is a tool which provides read-only access to select patient information with whom you have a relationship. Its easy to use and provides real time access to review your patients record including encounter summaries, notes, results, and demographic information.    If you feel you have received this communication in error or would no longer like to receive these types of communications, please e-mail [email or web address]

## 2022-05-30 ENCOUNTER — TELEPHONE (OUTPATIENT)
Dept: OPHTHALMOLOGY | Facility: CLINIC | Age: 27
End: 2022-05-30
Payer: MEDICAID

## 2022-05-30 ENCOUNTER — ANESTHESIA EVENT (OUTPATIENT)
Dept: SURGERY | Facility: HOSPITAL | Age: 27
End: 2022-05-30
Payer: MEDICARE

## 2022-05-30 NOTE — PRE-PROCEDURE INSTRUCTIONS
PREOP INSTRUCTIONS:     NO SOLID FOOD, MILK OR MILK PRODUCTS 8 HOURS PRIOR TO SX START TIME.  YOU MAY ONLY HAVE SIPS OF WATER WITH MORNING MEDICATIONS (1) HOUR PRIOR TO ARRIVAL TO HOSPITAL.   Instructed to follow the surgeon's instructions if they differ from these.Shower instructions as well as directions to the Surgery Center were given.Encouraged to wear loose fitting,comfortable clothing.Medication instructions for pm prior to and am of procedure reviewed.Instructed to avoid taking vitamins,supplements,aspirin and ibuprofen the morning of surgery. Patient's questions and concerns addressed .Patient informed of the current visitor policy      Patient denies any side effects or issues with anesthesia or sedation.      1000 ARRIVAL TO [facility]

## 2022-05-30 NOTE — TELEPHONE ENCOUNTER
----- Message from Alpesh Burkett sent at 2/11/2022 10:21 AM CST -----  Contact: Patient  The pt called an would like to have a nurse call her back    This is regarding a refill for escitalopram oxalate (LEXAPRO) 20 MG tablet 30 tablet     The pt can be reached at [phone] or [phone]    
----- Message from Phoebe Gill sent at 2/11/2022  3:53 PM CST -----  Contact: [phone] or [phone]  Ping Jurado MA    This is where they need the prescription to go she states       Requesting an RX refill or new RX.  Is this a refill or new RX: refill  RX name and strength (copy/paste from chart):  escitalopram oxalate (LEXAPRO) 20 MG tablet  Is this a 30 day or 90 day RX: 30  Pharmacy name and phone # (copy/paste from chart):   [facility] [address] [place] [address] [address] [place] [address]  [address] [place] [address]   Phone: [phone] Fax: [phone]      The doctors have asked that we provide their patients with the following 2 reminders -- prescription refills can take up to 72 hours, and a friendly reminder that in the future you can use your [facility] account to request refills: yes             
----- Message from Phoebe Gill sent at 2/11/2022  3:53 PM CST -----  Contact: [phone] or [phone]  Ping Jurado MA    This is where they need the prescription to go she states       Requesting an RX refill or new RX.  Is this a refill or new RX: refill  RX name and strength (copy/paste from chart):  escitalopram oxalate (LEXAPRO) 20 MG tablet  Is this a 30 day or 90 day RX: 30  Pharmacy name and phone # (copy/paste from chart):   [facility] [address] [place] [address] [address] [place] [address]  [address] [place] [address]  Phone: [phone] Fax: [phone]      The doctors have asked that we provide their patients with the following 2 reminders -- prescription refills can take up to 72 hours, and a friendly reminder that in the future you can use your [facility] account to request refills: yes             
meds pended   
meds pended waiting for approval   
Self

## 2022-05-31 ENCOUNTER — HOSPITAL ENCOUNTER (OUTPATIENT)
Facility: HOSPITAL | Age: 27
Discharge: HOME | End: 2022-05-31
Attending: OPHTHALMOLOGY | Admitting: OPHTHALMOLOGY
Payer: MEDICARE

## 2022-05-31 ENCOUNTER — ANESTHESIA (OUTPATIENT)
Dept: SURGERY | Facility: HOSPITAL | Age: 27
End: 2022-05-31
Payer: MEDICAID

## 2022-05-31 VITALS
OXYGEN SATURATION: 100 % | RESPIRATION RATE: 15 BRPM | DIASTOLIC BLOOD PRESSURE: 87 MMHG | SYSTOLIC BLOOD PRESSURE: 154 MMHG | HEART RATE: 82 BPM | TEMPERATURE: 98 F | WEIGHT: 132.25 LBS | HEIGHT: 64 IN | BODY MASS INDEX: 22.58 KG/M2

## 2022-05-31 DIAGNOSIS — H33.41 RETINAL DETACHMENT, TRACTIONAL, RIGHT: Primary | ICD-10-CM

## 2022-05-31 LAB
HCG INTACT+B SERPL-ACNC: <2.4 MIU/ML
POCT GLUCOSE: 194 MG/DL (ref 70–110)
POCT GLUCOSE: 253 MG/DL (ref 70–110)
POTASSIUM SERPL-SCNC: 5.3 MMOL/L (ref 3.5–5.1)

## 2022-05-31 PROCEDURE — 37000009 HC ANESTHESIA EA ADD 15 MINS: Mod: NTX | Performed by: OPHTHALMOLOGY

## 2022-05-31 PROCEDURE — 25000003 PHARM REV CODE 250: Mod: TXP | Performed by: OPHTHALMOLOGY

## 2022-05-31 PROCEDURE — 36000707: Mod: TXP | Performed by: OPHTHALMOLOGY

## 2022-05-31 PROCEDURE — 25000003 PHARM REV CODE 250: Mod: NTX | Performed by: STUDENT IN AN ORGANIZED HEALTH CARE EDUCATION/TRAINING PROGRAM

## 2022-05-31 PROCEDURE — 82962 GLUCOSE BLOOD TEST: CPT | Mod: NTX | Performed by: OPHTHALMOLOGY

## 2022-05-31 PROCEDURE — 36000706: Mod: TXP | Performed by: OPHTHALMOLOGY

## 2022-05-31 PROCEDURE — 25000003 PHARM REV CODE 250: Mod: NTX | Performed by: NURSE ANESTHETIST, CERTIFIED REGISTERED

## 2022-05-31 PROCEDURE — C1784 OCULAR DEV, INTRAOP, DET RET: HCPCS | Mod: TXP | Performed by: OPHTHALMOLOGY

## 2022-05-31 PROCEDURE — 71000044 HC DOSC ROUTINE RECOVERY FIRST HOUR: Mod: NTX | Performed by: OPHTHALMOLOGY

## 2022-05-31 PROCEDURE — D9220A PRA ANESTHESIA: ICD-10-PCS | Mod: ANES,NTX,, | Performed by: ANESTHESIOLOGY

## 2022-05-31 PROCEDURE — 99499 NO LOS: ICD-10-PCS | Mod: NTX,,, | Performed by: STUDENT IN AN ORGANIZED HEALTH CARE EDUCATION/TRAINING PROGRAM

## 2022-05-31 PROCEDURE — 71000016 HC POSTOP RECOV ADDL HR: Mod: NTX | Performed by: OPHTHALMOLOGY

## 2022-05-31 PROCEDURE — 67113 REPAIR RETINAL DETACH CPLX: CPT | Mod: RT,NTX,, | Performed by: OPHTHALMOLOGY

## 2022-05-31 PROCEDURE — 84702 CHORIONIC GONADOTROPIN TEST: CPT | Mod: TXP | Performed by: OPHTHALMOLOGY

## 2022-05-31 PROCEDURE — D9220A PRA ANESTHESIA: Mod: ANES,NTX,, | Performed by: ANESTHESIOLOGY

## 2022-05-31 PROCEDURE — 27201423 OPTIME MED/SURG SUP & DEVICES STERILE SUPPLY: Mod: NTX | Performed by: OPHTHALMOLOGY

## 2022-05-31 PROCEDURE — 67113 PR REPAIR COMPLEX RETINA DETACH VITRECTOMY & MEMB PEEL: ICD-10-PCS | Mod: RT,NTX,, | Performed by: OPHTHALMOLOGY

## 2022-05-31 PROCEDURE — D9220A PRA ANESTHESIA: Mod: CRNA,NTX,, | Performed by: NURSE ANESTHETIST, CERTIFIED REGISTERED

## 2022-05-31 PROCEDURE — 63600175 PHARM REV CODE 636 W HCPCS: Mod: NTX | Performed by: OPHTHALMOLOGY

## 2022-05-31 PROCEDURE — D9220A PRA ANESTHESIA: ICD-10-PCS | Mod: CRNA,NTX,, | Performed by: NURSE ANESTHETIST, CERTIFIED REGISTERED

## 2022-05-31 PROCEDURE — 25000003 PHARM REV CODE 250: Mod: TXP | Performed by: NURSE ANESTHETIST, CERTIFIED REGISTERED

## 2022-05-31 PROCEDURE — 84132 ASSAY OF SERUM POTASSIUM: CPT | Mod: TXP | Performed by: OPHTHALMOLOGY

## 2022-05-31 PROCEDURE — 37000008 HC ANESTHESIA 1ST 15 MINUTES: Mod: NTX | Performed by: OPHTHALMOLOGY

## 2022-05-31 PROCEDURE — 71000015 HC POSTOP RECOV 1ST HR: Mod: TXP | Performed by: OPHTHALMOLOGY

## 2022-05-31 PROCEDURE — 82962 GLUCOSE BLOOD TEST: CPT | Mod: NTX,91 | Performed by: OPHTHALMOLOGY

## 2022-05-31 PROCEDURE — 63600175 PHARM REV CODE 636 W HCPCS: Mod: TXP | Performed by: ANESTHESIOLOGY

## 2022-05-31 PROCEDURE — 63600175 PHARM REV CODE 636 W HCPCS: Mod: TXP | Performed by: NURSE ANESTHETIST, CERTIFIED REGISTERED

## 2022-05-31 PROCEDURE — 99499 UNLISTED E&M SERVICE: CPT | Mod: NTX,,, | Performed by: STUDENT IN AN ORGANIZED HEALTH CARE EDUCATION/TRAINING PROGRAM

## 2022-05-31 RX ORDER — SODIUM CHLORIDE 0.9 % (FLUSH) 0.9 %
10 SYRINGE (ML) INJECTION
Status: DISCONTINUED | OUTPATIENT
Start: 2022-05-31 | End: 2022-05-31 | Stop reason: HOSPADM

## 2022-05-31 RX ORDER — TRIAMCINOLONE ACETONIDE 40 MG/ML
INJECTION, SUSPENSION INTRA-ARTICULAR; INTRAMUSCULAR
Status: DISCONTINUED | OUTPATIENT
Start: 2022-05-31 | End: 2022-05-31 | Stop reason: HOSPADM

## 2022-05-31 RX ORDER — MIDAZOLAM HYDROCHLORIDE 1 MG/ML
INJECTION, SOLUTION INTRAMUSCULAR; INTRAVENOUS
Status: DISCONTINUED | OUTPATIENT
Start: 2022-05-31 | End: 2022-05-31

## 2022-05-31 RX ORDER — PHENYLEPHRINE HYDROCHLORIDE 25 MG/ML
1 SOLUTION/ DROPS OPHTHALMIC
Status: DISCONTINUED | OUTPATIENT
Start: 2022-05-31 | End: 2022-05-31 | Stop reason: HOSPADM

## 2022-05-31 RX ORDER — HYDROCODONE BITARTRATE AND ACETAMINOPHEN 5; 325 MG/1; MG/1
1 TABLET ORAL EVERY 4 HOURS PRN
Status: DISCONTINUED | OUTPATIENT
Start: 2022-05-31 | End: 2022-05-31 | Stop reason: HOSPADM

## 2022-05-31 RX ORDER — TROPICAMIDE 10 MG/ML
1 SOLUTION/ DROPS OPHTHALMIC
Status: DISCONTINUED | OUTPATIENT
Start: 2022-05-31 | End: 2022-05-31 | Stop reason: HOSPADM

## 2022-05-31 RX ORDER — SODIUM CHLORIDE 0.9 % (FLUSH) 0.9 %
3 SYRINGE (ML) INJECTION EVERY 30 MIN PRN
Status: DISCONTINUED | OUTPATIENT
Start: 2022-05-31 | End: 2022-05-31 | Stop reason: HOSPADM

## 2022-05-31 RX ORDER — CISATRACURIUM BESYLATE 2 MG/ML
INJECTION, SOLUTION INTRAVENOUS
Status: DISCONTINUED
Start: 2022-05-31 | End: 2022-05-31 | Stop reason: HOSPADM

## 2022-05-31 RX ORDER — PHENYLEPHRINE HCL IN 0.9% NACL 1 MG/10 ML
SYRINGE (ML) INTRAVENOUS
Status: DISCONTINUED | OUTPATIENT
Start: 2022-05-31 | End: 2022-05-31

## 2022-05-31 RX ORDER — ACETAMINOPHEN 325 MG/1
325 TABLET ORAL EVERY 6 HOURS PRN
Refills: 0
Start: 2022-05-31 | End: 2022-09-14

## 2022-05-31 RX ORDER — TETRACAINE HYDROCHLORIDE 5 MG/ML
1 SOLUTION OPHTHALMIC
Status: DISCONTINUED | OUTPATIENT
Start: 2022-05-31 | End: 2022-05-31 | Stop reason: HOSPADM

## 2022-05-31 RX ORDER — FENTANYL CITRATE 50 UG/ML
INJECTION, SOLUTION INTRAMUSCULAR; INTRAVENOUS
Status: DISCONTINUED | OUTPATIENT
Start: 2022-05-31 | End: 2022-05-31

## 2022-05-31 RX ORDER — TRIAMCINOLONE ACETONIDE 40 MG/ML
INJECTION, SUSPENSION INTRA-ARTICULAR; INTRAMUSCULAR
Status: DISCONTINUED
Start: 2022-05-31 | End: 2022-05-31 | Stop reason: HOSPADM

## 2022-05-31 RX ORDER — ONDANSETRON 2 MG/ML
4 INJECTION INTRAMUSCULAR; INTRAVENOUS DAILY PRN
Status: DISCONTINUED | OUTPATIENT
Start: 2022-05-31 | End: 2022-05-31 | Stop reason: HOSPADM

## 2022-05-31 RX ORDER — ACETAMINOPHEN 325 MG/1
650 TABLET ORAL EVERY 4 HOURS PRN
Status: DISCONTINUED | OUTPATIENT
Start: 2022-05-31 | End: 2022-05-31 | Stop reason: HOSPADM

## 2022-05-31 RX ORDER — PREDNISOLONE ACETATE 10 MG/ML
1 SUSPENSION/ DROPS OPHTHALMIC
Status: DISCONTINUED | OUTPATIENT
Start: 2022-05-31 | End: 2022-05-31 | Stop reason: HOSPADM

## 2022-05-31 RX ORDER — ATROPINE SULFATE 10 MG/ML
1 SOLUTION/ DROPS OPHTHALMIC
Status: DISCONTINUED | OUTPATIENT
Start: 2022-05-31 | End: 2022-05-31 | Stop reason: HOSPADM

## 2022-05-31 RX ORDER — FENTANYL CITRATE 50 UG/ML
25 INJECTION, SOLUTION INTRAMUSCULAR; INTRAVENOUS EVERY 5 MIN PRN
Status: DISCONTINUED | OUTPATIENT
Start: 2022-05-31 | End: 2022-05-31 | Stop reason: HOSPADM

## 2022-05-31 RX ORDER — VANCOMYCIN HYDROCHLORIDE 500 MG/10ML
INJECTION, POWDER, LYOPHILIZED, FOR SOLUTION INTRAVENOUS
Status: DISCONTINUED | OUTPATIENT
Start: 2022-05-31 | End: 2022-05-31 | Stop reason: HOSPADM

## 2022-05-31 RX ORDER — DEXAMETHASONE SODIUM PHOSPHATE 4 MG/ML
INJECTION, SOLUTION INTRA-ARTICULAR; INTRALESIONAL; INTRAMUSCULAR; INTRAVENOUS; SOFT TISSUE
Status: DISCONTINUED | OUTPATIENT
Start: 2022-05-31 | End: 2022-05-31

## 2022-05-31 RX ORDER — LABETALOL HYDROCHLORIDE 5 MG/ML
INJECTION, SOLUTION INTRAVENOUS
Status: DISCONTINUED | OUTPATIENT
Start: 2022-05-31 | End: 2022-05-31

## 2022-05-31 RX ORDER — ONDANSETRON 2 MG/ML
INJECTION INTRAMUSCULAR; INTRAVENOUS
Status: DISCONTINUED | OUTPATIENT
Start: 2022-05-31 | End: 2022-05-31

## 2022-05-31 RX ORDER — DEXAMETHASONE SODIUM PHOSPHATE 4 MG/ML
INJECTION, SOLUTION INTRA-ARTICULAR; INTRALESIONAL; INTRAMUSCULAR; INTRAVENOUS; SOFT TISSUE
Status: DISCONTINUED | OUTPATIENT
Start: 2022-05-31 | End: 2022-05-31 | Stop reason: HOSPADM

## 2022-05-31 RX ORDER — CYCLOPENTOLATE HYDROCHLORIDE 10 MG/ML
1 SOLUTION/ DROPS OPHTHALMIC
Status: DISCONTINUED | OUTPATIENT
Start: 2022-05-31 | End: 2022-05-31 | Stop reason: HOSPADM

## 2022-05-31 RX ORDER — EPINEPHRINE 1 MG/ML
INJECTION, SOLUTION INTRACARDIAC; INTRAMUSCULAR; INTRAVENOUS; SUBCUTANEOUS
Status: DISCONTINUED | OUTPATIENT
Start: 2022-05-31 | End: 2022-05-31 | Stop reason: HOSPADM

## 2022-05-31 RX ORDER — PROPOFOL 10 MG/ML
VIAL (ML) INTRAVENOUS
Status: DISCONTINUED | OUTPATIENT
Start: 2022-05-31 | End: 2022-05-31

## 2022-05-31 RX ORDER — LIDOCAINE HYDROCHLORIDE 20 MG/ML
INJECTION, SOLUTION EPIDURAL; INFILTRATION; INTRACAUDAL; PERINEURAL
Status: DISCONTINUED | OUTPATIENT
Start: 2022-05-31 | End: 2022-05-31

## 2022-05-31 RX ORDER — MOXIFLOXACIN 5 MG/ML
1 SOLUTION/ DROPS OPHTHALMIC
Status: DISCONTINUED | OUTPATIENT
Start: 2022-05-31 | End: 2022-05-31 | Stop reason: HOSPADM

## 2022-05-31 RX ORDER — NEOMYCIN SULFATE, POLYMYXIN B SULFATE, AND DEXAMETHASONE 3.5; 10000; 1 MG/G; [USP'U]/G; MG/G
OINTMENT OPHTHALMIC
Status: DISCONTINUED | OUTPATIENT
Start: 2022-05-31 | End: 2022-05-31 | Stop reason: HOSPADM

## 2022-05-31 RX ORDER — ROCURONIUM BROMIDE 10 MG/ML
INJECTION, SOLUTION INTRAVENOUS
Status: DISCONTINUED | OUTPATIENT
Start: 2022-05-31 | End: 2022-05-31

## 2022-05-31 RX ORDER — FENTANYL CITRATE 50 UG/ML
INJECTION, SOLUTION INTRAMUSCULAR; INTRAVENOUS
Status: DISCONTINUED
Start: 2022-05-31 | End: 2022-05-31 | Stop reason: HOSPADM

## 2022-05-31 RX ADMIN — FENTANYL CITRATE 25 MCG: 50 INJECTION INTRAMUSCULAR; INTRAVENOUS at 02:05

## 2022-05-31 RX ADMIN — ROCURONIUM BROMIDE 40 MG: 10 INJECTION INTRAVENOUS at 11:05

## 2022-05-31 RX ADMIN — LIDOCAINE HYDROCHLORIDE 80 MG: 20 INJECTION, SOLUTION EPIDURAL; INFILTRATION; INTRACAUDAL at 11:05

## 2022-05-31 RX ADMIN — PROPOFOL 150 MG: 10 INJECTION, EMULSION INTRAVENOUS at 11:05

## 2022-05-31 RX ADMIN — Medication 10 MG: at 02:05

## 2022-05-31 RX ADMIN — MOXIFLOXACIN 1 DROP: 5 SOLUTION/ DROPS OPHTHALMIC at 10:05

## 2022-05-31 RX ADMIN — Medication 100 MCG: at 11:05

## 2022-05-31 RX ADMIN — TETRACAINE HYDROCHLORIDE 1 DROP: 5 SOLUTION OPHTHALMIC at 10:05

## 2022-05-31 RX ADMIN — PHENYLEPHRINE HYDROCHLORIDE 1 DROP: 25 SOLUTION/ DROPS OPHTHALMIC at 10:05

## 2022-05-31 RX ADMIN — PREDNISOLONE ACETATE 1 DROP: 10 SUSPENSION OPHTHALMIC at 10:05

## 2022-05-31 RX ADMIN — HYDROCODONE BITARTRATE AND ACETAMINOPHEN 1 TABLET: 5; 325 TABLET ORAL at 03:05

## 2022-05-31 RX ADMIN — CYCLOPENTOLATE HYDROCHLORIDE 1 DROP: 10 SOLUTION OPHTHALMIC at 10:05

## 2022-05-31 RX ADMIN — PHENYLEPHRINE HYDROCHLORIDE 1 DROP: 25 SOLUTION/ DROPS OPHTHALMIC at 11:05

## 2022-05-31 RX ADMIN — SODIUM CHLORIDE: 0.9 INJECTION, SOLUTION INTRAVENOUS at 11:05

## 2022-05-31 RX ADMIN — GLYCOPYRROLATE 0.2 MG: 0.2 INJECTION, SOLUTION INTRAMUSCULAR; INTRAVITREAL at 02:05

## 2022-05-31 RX ADMIN — SUGAMMADEX 200 MG: 100 INJECTION, SOLUTION INTRAVENOUS at 02:05

## 2022-05-31 RX ADMIN — TROPICAMIDE 1 DROP: 10 SOLUTION/ DROPS OPHTHALMIC at 10:05

## 2022-05-31 RX ADMIN — FENTANYL CITRATE 50 MCG: 50 INJECTION INTRAMUSCULAR; INTRAVENOUS at 11:05

## 2022-05-31 RX ADMIN — MIDAZOLAM 2 MG: 1 INJECTION INTRAMUSCULAR; INTRAVENOUS at 11:05

## 2022-05-31 RX ADMIN — ATROPINE SULFATE 1 DROP: 10 SOLUTION OPHTHALMIC at 10:05

## 2022-05-31 RX ADMIN — TROPICAMIDE 1 DROP: 10 SOLUTION/ DROPS OPHTHALMIC at 11:05

## 2022-05-31 RX ADMIN — DEXAMETHASONE SODIUM PHOSPHATE 4 MG: 4 INJECTION, SOLUTION INTRAMUSCULAR; INTRAVENOUS at 12:05

## 2022-05-31 RX ADMIN — SODIUM CHLORIDE: 0.9 INJECTION, SOLUTION INTRAVENOUS at 01:05

## 2022-05-31 RX ADMIN — ROCURONIUM BROMIDE 10 MG: 10 INJECTION INTRAVENOUS at 12:05

## 2022-05-31 RX ADMIN — SODIUM CHLORIDE 0.25 MCG/KG/MIN: 9 INJECTION, SOLUTION INTRAVENOUS at 12:05

## 2022-05-31 RX ADMIN — ONDANSETRON 4 MG: 2 INJECTION, SOLUTION INTRAMUSCULAR; INTRAVENOUS at 12:05

## 2022-05-31 RX ADMIN — FENTANYL CITRATE 25 MCG: 50 INJECTION INTRAMUSCULAR; INTRAVENOUS at 03:05

## 2022-05-31 NOTE — ANESTHESIA PROCEDURE NOTES
Intubation    Date/Time: 5/31/2022 11:42 AM  Performed by: Aurelia Espinoza CRNA  Authorized by: Lola Vegas MD     Intubation:     Induction:  Intravenous    Intubated:  Postinduction    Mask Ventilation:  Easy mask    Attempts:  1    Attempted By:  Resident anesthesiologist    Method of Intubation:  Direct    Blade:  Bailey 2    Laryngeal View Grade: Grade I - full view of cords      Difficult Airway Encountered?: No      Complications:  None    Airway Device:  Oral endotracheal tube    Airway Device Size:  7.0    Style/Cuff Inflation:  Cuffed (inflated to minimal occlusive pressure)    Tube secured:  22    Secured at:  The lips    Placement Verified By:  Capnometry    Complicating Factors:  None    Findings Post-Intubation:  BS equal bilateral and atraumatic/condition of teeth unchanged

## 2022-05-31 NOTE — TRANSFER OF CARE
"Anesthesia Transfer of Care Note    Patient: Isabela Read    Procedure(s) Performed: Procedure(s) (LRB):  VITRECTOMY, PARS PLANA APPROACH (Right)  PHOTOCOAGULATION, RETINA, USING LASER (Right)    Patient location: [facility]    Anesthesia Type: general    Transport from OR: Transported from OR on room air with adequate spontaneous ventilation    Post pain: adequate analgesia    Post assessment: no apparent anesthetic complications and tolerated procedure well    Post vital signs: stable    Level of consciousness: sedated    Nausea/Vomiting: no nausea/vomiting    Complications: none    Transfer of care protocol was followed      Last vitals:   Visit Vitals  BP (!) 146/81 (BP Location: Right arm, Patient Position: Lying)   Pulse 86   Temp 36.8 °C (98.2 °F) (Temporal)   Resp 18   Ht 5' 4" (1.626 m)   Wt 60 kg (132 lb 4.4 oz)   LMP 05/17/2022 (Approximate)   SpO2 100%   Breastfeeding No   BMI 22.71 kg/m²     "

## 2022-05-31 NOTE — H&P
Pre-Operative History & Physical  Ophthalmology      SUBJECTIVE:     History of Present Illness:  Patient is a 27 y.o. 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    MEDICATIONS:   No medications prior to admission.       ALLERGIES: Review of patient's allergies indicates:  No Known Allergies    PAST MEDICAL HISTORY:   Past Medical History:   Diagnosis Date    Anemia     Chronic hypertension with exacerbation during pregnancy in second trimester 11/6/2020    Current regimen (11/6/20):  - Carvedilol 12.5 mg BID - Nifedipine 30 mg daily Baseline CKD + proteinuria    Chronic kidney disease     Depression     Diabetes mellitus     Diabetic retinopathy     Diarrhea     Encounter for blood transfusion     Gastroparesis     Glaucoma     Hepatomegaly 4/29/2021    Hx of psychiatric care     Hyperlipidemia     Hypertension     Nephrotic syndrome     Nephrotic syndrome 3/4/2021    Palpitations     Poor fetal growth affecting management of mother in second trimester 10/15/2020    Restrictive lung disease     Retinal detachment     Severe pre-eclampsia in second trimester 11/6/2020     PAST SURGICAL HISTORY:   Past Surgical History:   Procedure Laterality Date    AV FISTULA PLACEMENT Left 4/7/2021    Procedure: CREATION, AV FISTULA;  Surgeon: Roberto Ryan MD;  Location: [facility] OR 2ND FLR;  Service: Peripheral Vascular;  Laterality: Left;    COLONOSCOPY N/A 3/16/2022    Procedure: COLONOSCOPY;  Surgeon: Tavo Kwok MD;  Location: [facility] (4TH FLR);  Service: Endoscopy;  Laterality: N/A;  Questionable history of delayed gastric emptying longstanding diabetes now on eating pre transplant workup for history of nausea vomiting which seems to have improved with dialysis also chronic diarrhea and history of anemia pre transplant workup for kidney transplant. 3    ESOPHAGOGASTRODUODENOSCOPY N/A 3/16/2022     Procedure: EGD (ESOPHAGOGASTRODUODENOSCOPY);  Surgeon: Tavo Kwok MD;  Location: [facility] (4TH FLR);  Service: Endoscopy;  Laterality: N/A;  Questionable history of delayed gastric emptying longstanding diabetes now on eating pre transplant workup for history of nausea vomiting which seems to have improved with dialysis also chronic diarrhea and history of anemia pre transplant workup for    FISTULOGRAM N/A 8/11/2021    Procedure: Fistulogram;  Surgeon: Roberto Ryan MD;  Location: [facility] CATH LAB;  Service: Cardiology;  Laterality: N/A;    PERCUTANEOUS TRANSLUMINAL ANGIOPLASTY OF ARTERIOVENOUS FISTULA N/A 8/11/2021    Procedure: PTA, AV FISTULA;  Surgeon: Roberto Ryan MD;  Location: [facility] CATH LAB;  Service: Cardiology;  Laterality: N/A;    REMOVAL IMPLANT, POSTERIOR SEGMENT, INTRAOCULAR Right 2/1/2022    Procedure: REMOVAL IMPLANT, POSTERIOR SEGMENT, INTRAOCULAR;  Surgeon: Maximilian Montaño MD;  Location: [facility] OR 1ST FLR;  Service: Ophthalmology;  Laterality: Right;    REPAIR OF RETINAL DETACHMENT WITH VITRECTOMY Right 1/25/2022    Procedure: REPAIR, RETINAL DETACHMENT, WITH VITRECTOMY, MEMBRANE PEEL, LASER, INJECTION OF GAS VS OIL;  Surgeon: Maximilian Montaño MD;  Location: [facility] OR 1ST FLR;  Service: Ophthalmology;  Laterality: Right;    REVISION OF ARTERIOVENOUS FISTULA Left 12/10/2021    Procedure: REVISION, AV FISTULA with BVT;  Surgeon: Roberto Ryan MD;  Location: [facility] OR 2ND FLR;  Service: Peripheral Vascular;  Laterality: Left;    VITRECTOMY BY PARS PLANA APPROACH Right 2/1/2022    Procedure: VITRECTOMY, PARS PLANA APPROACH;  Surgeon: Maximilian Montaño MD;  Location: [facility] OR 1ST FLR;  Service: Ophthalmology;  Laterality: Right;     PAST FAMILY HISTORY:   Family History   Problem Relation Age of Onset    Hypertension Mother     Heart disease Father     Diabetes Brother     Celiac disease Neg Hx     Cirrhosis Neg Hx     Colon cancer Neg Hx     Colon polyps Neg Hx      Crohn's disease Neg Hx     Inflammatory bowel disease Neg Hx     Liver cancer Neg Hx     Liver disease Neg Hx     Rectal cancer Neg Hx     Stomach cancer Neg Hx     Ulcerative colitis Neg Hx     Esophageal cancer Neg Hx     Hemochromatosis Neg Hx     Pancreatic cancer Neg Hx     Kidney cancer Neg Hx     Bladder Cancer Neg Hx     Uterine cancer Neg Hx     Ovarian cancer Neg Hx      SOCIAL HISTORY:   Social History     Tobacco Use    Smoking status: Never Smoker    Smokeless tobacco: Never Used   Substance Use Topics    Alcohol use: No    Drug use: No        MENTAL STATUS: Alert    REVIEW OF SYSTEMS: Negative    OBJECTIVE:     Vital Signs (Most Recent)       Physical Exam:  General: NAD  HEENT: AT/NC  Lungs: Adequate respirations  Heart: + pulses  Abdomen: Soft    ASSESSMENT/PLAN:     Patient is a 27 y.o. 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    Planned Procedure: 23 ga PPV, PPL, SO    -Risks/benefits/alternatives of the procedure were discussed extensively with the patient and/or family, including (but not limited to): infection, bleeding, pain, corneal edema, persistent corneal defect, cataract formation (if phakic), elevated intraocular pressure, hypotony, retinal tears, retinal detachment, macular edema, epiretinal membrane, possible need for strict post-op head positioning, possible temporary avoidance of air travel, loss of vision, and loss of the eye. The patient/family voiced good understanding. The informed consent was signed, witnessed, and placed in chart. All patient and family questions were answered.     -Anesthesia: general  -Allergies reviewed: Review of patient's allergies indicates:  No Known Allergies  -Anticoagulation/NSAIDS: no  -Lens Status: phakic  -Diabetic: yes      T[facility]un PGY-2  Att: Dr. Montaño

## 2022-05-31 NOTE — BRIEF OP NOTE
Date of Procedure: 05/31/2022     Pre-Op Dx: Proliferative diabetic retinopathy, tractional retinal detachment, right eye    Post Op Dx:  Proliferative diabetic retinopathy, combined tractional and rhegmatogenous retinal detachment, proliferative vitreoretinopathy, right eye    Procedure Performed: Pars plana vitrectomy, pars plana lensectomy, membrane peel, endolaser, injection of 5000 cs silicone oil, right eye    Attending Surgeon: ARMIN Montaño    Assistant Surgeon: ARMIN Bobo    Anesthesia: General    Estimated blood loss: Minimal    Complication: None    Specimen: None    Disposition: Stable to recovery    Findings: superonasal retinal breaks, macular, nasal, inferior and temporal retinal detachment    Outcome: Retina attached    Date of Discharge: 05/31/2022    Discharge Disposition: Home    F/U: 06/01/22

## 2022-05-31 NOTE — ANESTHESIA POSTPROCEDURE EVALUATION
Anesthesia Post Evaluation    Patient: Isabela Read    Procedure(s) Performed: Procedure(s) (LRB):  VITRECTOMY, PARS PLANA APPROACH (Right)  PHOTOCOAGULATION, RETINA, USING LASER (Right)    Final Anesthesia Type: general      Patient location during evaluation: PACU  Patient participation: Yes- Able to Participate  Level of consciousness: awake and alert  Post-procedure vital signs: reviewed and stable  Pain management: adequate  Airway patency: patent    PONV status at discharge: No PONV  Anesthetic complications: no      Cardiovascular status: blood pressure returned to baseline  Respiratory status: unassisted, spontaneous ventilation and room air  Hydration status: euvolemic  Follow-up not needed.          Vitals Value Taken Time   /87 05/31/22 1601   Temp 36.7 °C (98 °F) 05/31/22 1447   Pulse 84 05/31/22 1606   Resp 13 05/31/22 1606   SpO2 100 % 05/31/22 1606   Vitals shown include unvalidated device data.      No case tracking events are documented in the log.      Pain/Renny Score: Pain Rating Prior to Med Admin: 8 (5/31/2022  3:34 PM)  Renny Score: 10 (5/31/2022  4:00 PM)

## 2022-05-31 NOTE — PLAN OF CARE
Pt tolerated procedure well.  Discharge paperwork printed and reviewed with pt and family-copies of paperwork sent home with pt.  No complaints of pain or nausea.  Pt tolerating PO liquids well.  VSS.  IV removed.  Pt family member (uncle) driving pt home.  Safety maintained throughout care.

## 2022-05-31 NOTE — DISCHARGE SUMMARY
[facility] (1st Fl)  Discharge Note  Short Stay    Date of Admission: 05/31/2022    Procedure(s) (LRB):  VITRECTOMY, PARS PLANA APPROACH (Right)  PHOTOCOAGULATION, RETINA, USING LASER (Right)    OUTCOME: Patient tolerated treatment/procedure well without complication and is now ready for discharge.    DISPOSITION: Home or Self Care    FINAL DIAGNOSIS:  Retinal detachment, right eye    FOLLOWUP: In clinic    DISCHARGE INSTRUCTIONS:    Discharge Procedure Orders   Diet general     Sponge bath only until clinic visit     Lifting restrictions     Leave dressing on - Keep it clean, dry, and intact until clinic visit     Call MD for:  temperature >100.4     Call MD for:  persistent nausea and vomiting     Call MD for:  severe uncontrolled pain     Call MD for:  difficulty breathing, headache or visual disturbances     Call MD for:  redness, tenderness, or signs of infection (pain, swelling, redness, odor or green/yellow discharge around incision site)     Call MD for:  hives     Call MD for:  persistent dizziness or light-headedness     Call MD for:  extreme fatigue     Call MD for:        TIME SPENT ON DISCHARGE: 15 minutes

## 2022-05-31 NOTE — OP NOTE
Date of Procedure: 05/31/2022   Pre-Op Dx: Proliferative diabetic retinopathy, tractional retinal detachment, right eye  Post Op Dx:  Proliferative diabetic retinopathy, combined tractional and rhegmatogenous retinal detachment, proliferative vitreoretinopathy, right eye  Procedure Performed: Pars plana vitrectomy, pars plana lensectomy, membrane peel, endolaser, injection of 5000 cs silicone oil, right eye  Attending Surgeon: ARMIN Montaño  Assistant Surgeon: ARMIN Bobo  Anesthesia: General  Estimated blood loss: Minimal  Complication: None  Specimen: None  Disposition: Stable to recovery  Findings: superonasal retinal breaks, macular, nasal, inferior and temporal retinal detachment  Outcome: Retina attached  Date of Discharge: 05/31/2022  Discharge Disposition: Home  F/U: 06/01/22          Informed consent was obtained and placed in the medical record. The patient was properly identified and taken to the operating room. General anesthesia was begun by the anesthesia team.  The right eye was prepped and draped in the standard ophthalmic fashion taking care to exclude the lashes from the operative field.    Standard 23 gauge vitrectomy setup was achieved with all ports 3.75 mm posterior to the limbus. The TouchMail visualization system alternating with a high magnification contact lens were used.     Pars plana lensectomy was performed using the cutter.  The anterior capsule was left intact then an anterior capsulotomy was performed using the cutter.  Adequate capsular support was left for a potential future sulcus lens.  Two inferior peripheral iridotomies were created with the cutter being sure to cut through the capsule behind the iridotomies as well.    Core vitrectomy had already been performed in a prior procedure.  There was a macular, nasal, inferior and temporal retinal detachment with preretinal sheets of pigmented proliferative vitreoretinopathy membranes over the entirety of the detached areas.   The macular detachment was connected to the peripheral detachment inferiorly.  Meticulous peeling and removal of the posterior membranes was performed using ILM forceps.  The peeling revealed two retinal breaks in the superonasal periphery.  There was a temporal area where there was significant traction at the vitreous base which was tenting the retina and could not be peeled without creating breaks.  This area was cut, and a retinectomy was created from the vitreous base to the ora nora.  The retina was inspected for 360 degrees to the ora nora using scleral depression. There were no additional breaks noted.  It appeared that all traction had been relieved.  The breaks and retinectomy were marked with diathermy.  Perfluorocarbon liquid was injected into the eye.  The retina was lying completely flat under perfluorocarbon liquid.  Endolaser photocoagulation was placed around the retinectomy and the breaks and in a scatter panretinal photocoagulation pattern in the inferior periphery.  Fluid/air exchange was performed. The retina was lying nicely flat under air.  The eye was filled with 5000 cs silicone oil up to the pupil.    The ports were removed. The infusion cannula was removed.  All wounds were sutured with 7-0 Vicryl suture and noted not to be leaking.  The eye was normotensive. A subconjunctival injection of vancomycin and dexamethasone was placed.     The eye was patched. A Little shield was placed. The patient was awakened from general anesthesia by the anesthesia team having tolerated the procedure well.

## 2022-05-31 NOTE — ANESTHESIA PREPROCEDURE EVALUATION
05/31/2022  Isabela Read is a 27 y.o., female.    Past Medical History:   Diagnosis Date    Anemia     Chronic hypertension with exacerbation during pregnancy in second trimester 11/6/2020    Current regimen (11/6/20):  - Carvedilol 12.5 mg BID - Nifedipine 30 mg daily Baseline CKD + proteinuria    Chronic kidney disease     Depression     Diabetes mellitus     Diabetic retinopathy     Diarrhea     Encounter for blood transfusion     Gastroparesis     Glaucoma     Hepatomegaly 4/29/2021    Hx of psychiatric care     Hyperlipidemia     Hypertension     Nephrotic syndrome     Nephrotic syndrome 3/4/2021    Palpitations     Poor fetal growth affecting management of mother in second trimester 10/15/2020    Restrictive lung disease     Retinal detachment     Severe pre-eclampsia in second trimester 11/6/2020       Past Surgical History:   Procedure Laterality Date    AV FISTULA PLACEMENT Left 4/7/2021    Procedure: CREATION, AV FISTULA;  Surgeon: Roberto Ryan MD;  Location: [facility] OR [address] [place] [address];  Service: Peripheral Vascular;  Laterality: Left;    COLONOSCOPY N/A 3/16/2022    Procedure: COLONOSCOPY;  Surgeon: Tavo Kwok MD;  [facility]  Service: Endoscopy;  Laterality: N/A;  Questionable history of delayed gastric emptying longstanding diabetes now on eating pre transplant workup for history of nausea vomiting which seems to have improved with dialysis also chronic diarrhea and history of anemia pre transplant workup for kidney transplant. 3    ESOPHAGOGASTRODUODENOSCOPY N/A 3/16/2022    Procedure: EGD (ESOPHAGOGASTRODUODENOSCOPY);  Surgeon: Tavo Kwok MD;  Location: [facility] (4TH FLR);  Service: Endoscopy;  Laterality: N/A;  Questionable history of delayed gastric emptying longstanding diabetes now on eating pre transplant workup for  history of nausea vomiting which seems to have improved with dialysis also chronic diarrhea and history of anemia pre transplant workup for    FISTULOGRAM N/A 8/11/2021    Procedure: Fistulogram;  Surgeon: Roberto Ryan MD;  Location: [facility] CATH LAB;  Service: Cardiology;  Laterality: N/A;    PERCUTANEOUS TRANSLUMINAL ANGIOPLASTY OF ARTERIOVENOUS FISTULA N/A 8/11/2021    Procedure: PTA, AV FISTULA;  Surgeon: Roberto Ryan MD;  Location: [facility] CATH LAB;  Service: Cardiology;  Laterality: N/A;    REMOVAL IMPLANT, POSTERIOR SEGMENT, INTRAOCULAR Right 2/1/2022    Procedure: REMOVAL IMPLANT, POSTERIOR SEGMENT, INTRAOCULAR;  Surgeon: Maximilian Montaño MD;  Location: [facility] OR 1ST FLR;  Service: Ophthalmology;  Laterality: Right;    REPAIR OF RETINAL DETACHMENT WITH VITRECTOMY Right 1/25/2022    Procedure: REPAIR, RETINAL DETACHMENT, WITH VITRECTOMY, MEMBRANE PEEL, LASER, INJECTION OF GAS VS OIL;  Surgeon: Maximilian Montaño MD;  Location: [facility] OR 1ST FLR;  Service: Ophthalmology;  Laterality: Right;    REVISION OF ARTERIOVENOUS FISTULA Left 12/10/2021    Procedure: REVISION, AV FISTULA with BVT;  Surgeon: Roberto Ryan MD;  Location: [facility] OR 2ND FLR;  Service: Peripheral Vascular;  Laterality: Left;    VITRECTOMY BY PARS PLANA APPROACH Right 2/1/2022    Procedure: VITRECTOMY, PARS PLANA APPROACH;  Surgeon: Maximilian Montaño MD;  Location: [facility] OR 1ST FLR;  Service: Ophthalmology;  Laterality: Right;           Pre-op Assessment    I have reviewed the Patient Summary Reports.     I have reviewed the Nursing Notes.       Review of Systems  Anesthesia Hx:  No problems with previous Anesthesia  History of prior surgery of interest to airway management or planning: Denies Family Hx of Anesthesia complications.   Denies Personal Hx of Anesthesia complications.   Cardiovascular:   Hypertension Denies MI.    Denies Angina. Cardiac MRI reviewed  EF 42%   Pulmonary:  Pulmonary Normal  Denies Asthma.   Denies Recent URI.    Renal/:   Chronic Renal Disease (had dialysis yesterday- has had high potassium levels recently), ESRD, Dialysis    Endocrine:   Diabetes, type 1    Psych:   depression          Physical Exam  General: Alert, Oriented and Well nourished    Airway:  Mallampati: II   Mouth Opening: Normal  TM Distance: Normal  Neck ROM: Normal ROM    Dental:  Braces    Chest/Lungs:  Normal Respiratory Rate    Heart:  Rate: Normal  Rhythm: Regular Rhythm        Anesthesia Plan  Type of Anesthesia, risks & benefits discussed:    Anesthesia Type: MAC, Gen Natural Airway, Gen ETT  Intra-op Monitoring Plan: Standard ASA Monitors  Post Op Pain Control Plan: multimodal analgesia and IV/PO Opioids PRN  Induction:  IV  Informed Consent: Informed consent signed with the Patient and all parties understand the risks and agree with anesthesia plan.  All questions answered.   ASA Score: 3  Day of Surgery Review of History & Physical: H&P Update referred to the surgeon/provider.    Ready For Surgery From Anesthesia Perspective.     .

## 2022-06-01 ENCOUNTER — OFFICE VISIT (OUTPATIENT)
Dept: OPHTHALMOLOGY | Facility: CLINIC | Age: 27
End: 2022-06-01
Payer: MEDICARE

## 2022-06-01 DIAGNOSIS — Z98.890 POST-OPERATIVE STATE: ICD-10-CM

## 2022-06-01 DIAGNOSIS — E10.3521: Primary | ICD-10-CM

## 2022-06-01 PROCEDURE — 3066F PR DOCUMENTATION OF TREATMENT FOR NEPHROPATHY: ICD-10-PCS | Mod: CPTII,S$GLB,, | Performed by: STUDENT IN AN ORGANIZED HEALTH CARE EDUCATION/TRAINING PROGRAM

## 2022-06-01 PROCEDURE — 1159F MED LIST DOCD IN RCRD: CPT | Mod: CPTII,S$GLB,, | Performed by: STUDENT IN AN ORGANIZED HEALTH CARE EDUCATION/TRAINING PROGRAM

## 2022-06-01 PROCEDURE — 4010F ACE/ARB THERAPY RXD/TAKEN: CPT | Mod: CPTII,S$GLB,, | Performed by: STUDENT IN AN ORGANIZED HEALTH CARE EDUCATION/TRAINING PROGRAM

## 2022-06-01 PROCEDURE — 99024 POSTOP FOLLOW-UP VISIT: CPT | Mod: S$GLB,,, | Performed by: STUDENT IN AN ORGANIZED HEALTH CARE EDUCATION/TRAINING PROGRAM

## 2022-06-01 PROCEDURE — 99999 PR PBB SHADOW E&M-EST. PATIENT-LVL III: ICD-10-PCS | Mod: PBBFAC,,, | Performed by: STUDENT IN AN ORGANIZED HEALTH CARE EDUCATION/TRAINING PROGRAM

## 2022-06-01 PROCEDURE — 99024 PR POST-OP FOLLOW-UP VISIT: ICD-10-PCS | Mod: S$GLB,,, | Performed by: STUDENT IN AN ORGANIZED HEALTH CARE EDUCATION/TRAINING PROGRAM

## 2022-06-01 PROCEDURE — 3066F NEPHROPATHY DOC TX: CPT | Mod: CPTII,S$GLB,, | Performed by: STUDENT IN AN ORGANIZED HEALTH CARE EDUCATION/TRAINING PROGRAM

## 2022-06-01 PROCEDURE — 1159F PR MEDICATION LIST DOCUMENTED IN MEDICAL RECORD: ICD-10-PCS | Mod: CPTII,S$GLB,, | Performed by: STUDENT IN AN ORGANIZED HEALTH CARE EDUCATION/TRAINING PROGRAM

## 2022-06-01 PROCEDURE — 3052F HG A1C>EQUAL 8.0%<EQUAL 9.0%: CPT | Mod: CPTII,S$GLB,, | Performed by: STUDENT IN AN ORGANIZED HEALTH CARE EDUCATION/TRAINING PROGRAM

## 2022-06-01 PROCEDURE — 3052F PR MOST RECENT HEMOGLOBIN A1C LEVEL 8.0 - < 9.0%: ICD-10-PCS | Mod: CPTII,S$GLB,, | Performed by: STUDENT IN AN ORGANIZED HEALTH CARE EDUCATION/TRAINING PROGRAM

## 2022-06-01 PROCEDURE — 4010F PR ACE/ARB THEARPY RXD/TAKEN: ICD-10-PCS | Mod: CPTII,S$GLB,, | Performed by: STUDENT IN AN ORGANIZED HEALTH CARE EDUCATION/TRAINING PROGRAM

## 2022-06-01 PROCEDURE — 99999 PR PBB SHADOW E&M-EST. PATIENT-LVL III: CPT | Mod: PBBFAC,,, | Performed by: STUDENT IN AN ORGANIZED HEALTH CARE EDUCATION/TRAINING PROGRAM

## 2022-06-01 NOTE — PROGRESS NOTES
HPI     DLS  04/18/2022 by Dr. ARMIN Montaño     Pt here for 1 day post op PPV OD     No pain         All normal with vision OS    Hemoglobin A1C       Date                     Value               Ref Range             Status                10/23/2021               10.9 (H)            4.0 - 5.6 %           Final                       09/17/2021               10.2 (H)            4.0 - 5.6 %           Final                      04/06/2021               10.9 (H)            4.0 - 5.6 %           Final                        -eye pain  -headaches  -blurred Va  -diplopia  -flashes/floaters    Gtts: None                 POHx:   1. Type 1 DM OD w/ PDR and TRD involving macula  S/P PPV , Removal of silicone oil, injection of 14% C3F8 gas OD 02/01/2022        Last edited by Apple Ivey on 6/1/2022  9:58 AM. (History)            Assessment /Plan     For exam results, see Encounter Report.    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, associated with type 1 diabetes mellitus    Post-operative state      1. POD #1 s/p PPV/PPL/MP/temporal retinectomy/PFO/EL/AFx/5000cs SO, right eye for recurrent TRD/RRD/PVR right eye  - Doing well   - IOP OK   - start PF QID, Vigamox QID, Atropine QID, Maxitrol matilde qHS  - No vigorous activity, no lifting, bending, etc.  - Little shield when sleeping  - Little shield, glasses, or sunglasses all times for protection   - No shower   - Face down, left side down positioning  - RD/Endophthalmitis/ocular hypertension precautions       RTC 1 wk, sooner PRN if any problems (laure pain, redness, dimming or loss of vision)

## 2022-06-05 DIAGNOSIS — N18.6 TYPE 1 DIABETES MELLITUS WITH END-STAGE RENAL DISEASE (ESRD): ICD-10-CM

## 2022-06-05 DIAGNOSIS — E10.22 TYPE 1 DIABETES MELLITUS WITH END-STAGE RENAL DISEASE (ESRD): ICD-10-CM

## 2022-06-10 ENCOUNTER — OFFICE VISIT (OUTPATIENT)
Dept: OPHTHALMOLOGY | Facility: CLINIC | Age: 27
End: 2022-06-10
Payer: MEDICARE

## 2022-06-10 ENCOUNTER — TELEPHONE (OUTPATIENT)
Dept: TRANSPLANT | Facility: CLINIC | Age: 27
End: 2022-06-10
Payer: MEDICAID

## 2022-06-10 ENCOUNTER — COMMITTEE REVIEW (OUTPATIENT)
Dept: TRANSPLANT | Facility: CLINIC | Age: 27
End: 2022-06-10
Payer: MEDICAID

## 2022-06-10 DIAGNOSIS — E10.3521: Primary | ICD-10-CM

## 2022-06-10 DIAGNOSIS — Z76.82 ORGAN TRANSPLANT CANDIDATE: Primary | ICD-10-CM

## 2022-06-10 PROCEDURE — 3052F PR MOST RECENT HEMOGLOBIN A1C LEVEL 8.0 - < 9.0%: ICD-10-PCS | Mod: CPTII,S$GLB,, | Performed by: OPHTHALMOLOGY

## 2022-06-10 PROCEDURE — 99024 PR POST-OP FOLLOW-UP VISIT: ICD-10-PCS | Mod: S$GLB,,, | Performed by: OPHTHALMOLOGY

## 2022-06-10 PROCEDURE — 1159F MED LIST DOCD IN RCRD: CPT | Mod: CPTII,S$GLB,, | Performed by: OPHTHALMOLOGY

## 2022-06-10 PROCEDURE — 1160F PR REVIEW ALL MEDS BY PRESCRIBER/CLIN PHARMACIST DOCUMENTED: ICD-10-PCS | Mod: CPTII,S$GLB,, | Performed by: OPHTHALMOLOGY

## 2022-06-10 PROCEDURE — 99999 PR PBB SHADOW E&M-EST. PATIENT-LVL III: ICD-10-PCS | Mod: PBBFAC,,, | Performed by: OPHTHALMOLOGY

## 2022-06-10 PROCEDURE — 1160F RVW MEDS BY RX/DR IN RCRD: CPT | Mod: CPTII,S$GLB,, | Performed by: OPHTHALMOLOGY

## 2022-06-10 PROCEDURE — 4010F ACE/ARB THERAPY RXD/TAKEN: CPT | Mod: CPTII,S$GLB,, | Performed by: OPHTHALMOLOGY

## 2022-06-10 PROCEDURE — 3066F PR DOCUMENTATION OF TREATMENT FOR NEPHROPATHY: ICD-10-PCS | Mod: CPTII,S$GLB,, | Performed by: OPHTHALMOLOGY

## 2022-06-10 PROCEDURE — 4010F PR ACE/ARB THEARPY RXD/TAKEN: ICD-10-PCS | Mod: CPTII,S$GLB,, | Performed by: OPHTHALMOLOGY

## 2022-06-10 PROCEDURE — 99024 POSTOP FOLLOW-UP VISIT: CPT | Mod: S$GLB,,, | Performed by: OPHTHALMOLOGY

## 2022-06-10 PROCEDURE — 99999 PR PBB SHADOW E&M-EST. PATIENT-LVL III: CPT | Mod: PBBFAC,,, | Performed by: OPHTHALMOLOGY

## 2022-06-10 PROCEDURE — 1159F PR MEDICATION LIST DOCUMENTED IN MEDICAL RECORD: ICD-10-PCS | Mod: CPTII,S$GLB,, | Performed by: OPHTHALMOLOGY

## 2022-06-10 PROCEDURE — 3052F HG A1C>EQUAL 8.0%<EQUAL 9.0%: CPT | Mod: CPTII,S$GLB,, | Performed by: OPHTHALMOLOGY

## 2022-06-10 PROCEDURE — 3066F NEPHROPATHY DOC TX: CPT | Mod: CPTII,S$GLB,, | Performed by: OPHTHALMOLOGY

## 2022-06-10 RX ORDER — PREDNISOLONE ACETATE 10 MG/ML
1 SUSPENSION/ DROPS OPHTHALMIC 4 TIMES DAILY
Qty: 15 ML | Refills: 0 | Status: SHIPPED | OUTPATIENT
Start: 2022-06-10 | End: 2022-06-20 | Stop reason: SDUPTHER

## 2022-06-10 NOTE — LETTER
Aixa 10, 2022    Isabela Read  [address] [facility] [address]    Dear Isabela Read:  MRN: [id number]    Your transplant evaluation was reviewed at the [facility] Selection Committee meeting on 6/10/2022.  It is with regret I inform you that your workup is incomplete and we are unable to determine your transplant candidacy at this time due need for Pulmonary follow up with clearance and to optimize fluid removal during dialysis (fluid challenge). You will need to repeat echocardiogram in 6 weeks after fluid challenge. You will be re-presented to the selection committee once pending studies are completed.  You will be notified of the committees decision once we review the new results.    The [facility] Selection Committee carefully considers each patients transplant candidacy using established selection criteria to determine if it is safe to proceed with transplantation for each and every person evaluated.  Although the selection committee believes your workup is incomplete and we are unable to determine your transplant candidacy, you have the right to be evaluated at other transplant centers.  You may request your [facility] records be sent to any center of your choice by contacting our Medical Records Department at [phone].    Attached is a letter from the [employer] Network for Organ Sharing (UNOS).  It describes the services and information offered to patients by UNOS and the Organ Procurement and Transplant Network.    Sincerely,    Elmira Bronson MD  Medical Director, Kidney & Kidney/Pancreas Transplantation    Tj/Enclosed    Cc: [facility]    Encl: UNOS Letter             The Organ Procurement and Transplantation Network   Toll-free patient services line: Your resource for organ transplant information     If you have a question regarding your own medical care, you always should call your transplant hospital first. However, for  general organ transplant-related information, you can call the Organ Procurement and Transplantation Network (OPTN) toll-free patient services line at [phone].     Anyone, including potential transplant candidates, candidates, recipients, family members, friends, living donors, and donor family members, can call this number to:     · Talk about organ donation, living donation, the transplant process, the donation process, and transplant policies.   · Get a free patient information kit with helpful booklets, waiting list and transplant information, and a list of all transplant hospitals.   · Ask questions about the OPTN website (https://optn.transplant.hrsa.gov/), the United Network for Organ Sharings (UNOS) website (https://unos.org/), or the UNOS website for living donors and transplant recipients. (https://www.transplantliving.org/).   · Learn how the OPTN can help you.   · Talk about any concerns that you may have with a transplant hospital.     The nations transplant system, the OPTN, is managed under federal contract by the United Network for Organ Sharing (UNOS), which is a non-profit charitable organization. The OPTN helps create and define organ sharing policies that make the best use of donated organs. This process continuously evaluating new advances and discoveries so policies can be adapted to best serve patients waiting for transplants. To do so, the OPTN works closely with transplant professionals, transplant patients, transplant candidates, donor families, living donors, and the public. All transplant programs and organ procurement organizations throughout the country are OPTN members and are obligated to follow the policies the OPTN creates for allocating organs.     The OPTN also is responsible for:   · Providing educational material for patients, the public, and professionals.   · Raising awareness of the need for donated organs and tissue.   · Coordinating organ procurement, matching, and  placement.   · Collecting information about every organ transplant and donation that occurs in the [place].     Remember, you should contact your transplant hospital directly if you have questions or concerns about your own medical care including medical records, work-up progress, and test results.     We are not your transplant hospital, and our staff will not be able to answer questions about your case, so please keep your transplant hospitals phone number handy.   However, while you research your transplant needs and learn as much as you can about transplantation and donation, we welcome your call to our toll-free patient services line at [phone].

## 2022-06-10 NOTE — PROGRESS NOTES
Subjective:       Patient ID: Isabela Read is a 24 y.o. female with a PMH significant for T1D who was followed at [facility] and [facility] [address].  Her Insurance has changed, so switching to [facility].  She was seen initially by me on 2/18/2019.    Chief Complaint: Follow-up    HPI  Patient with complaints of dizziness since Friday.  She started on the BP medication change on Monday.  Dizziness is better today.  Leg swelling better.  Early AM FS glucose levels elevated in 200s.    Patient denies f/c, n/v/d.  No chest pain or SOB.  No abdominal pain or dysuria.  No headaches or change in vision.  No dizziness.  No significant  weight gain or weight loss.  Remaining ROS negative.    Review of Systems   Constitutional: Negative for appetite change, chills, diaphoresis, fatigue, fever and unexpected weight change.   HENT: Negative for ear pain, hearing loss, sinus pain, tinnitus, trouble swallowing and voice change.    Eyes: Negative for photophobia, pain and visual disturbance.   Respiratory: Negative for chest tightness, shortness of breath and wheezing.    Cardiovascular: Negative for chest pain, palpitations and leg swelling.   Gastrointestinal: Negative for abdominal pain, blood in stool, constipation, diarrhea, nausea and vomiting.   Endocrine: Negative for cold intolerance, heat intolerance, polydipsia, polyphagia and polyuria.   Genitourinary: Negative for decreased urine volume, difficulty urinating, dysuria, flank pain, hematuria, pelvic pain, vaginal bleeding, vaginal discharge and vaginal pain.   Musculoskeletal: Negative for arthralgias, gait problem, joint swelling, myalgias and neck pain.   Skin: Negative for rash.   Neurological: Positive for dizziness. Negative for tremors, syncope, weakness, numbness and headaches.   Hematological: Does not bruise/bleed easily.   Psychiatric/Behavioral: Negative for agitation, confusion and sleep disturbance. The patient is not nervous/anxious.        Objective:       Physical Exam   Constitutional: She is oriented to person, place, and time. She appears well-developed and well-nourished. No distress.   HENT:   Head: Normocephalic and atraumatic.   Nose: Nose normal.   Mouth/Throat: Oropharynx is clear and moist.   Eyes: Conjunctivae and EOM are normal. Pupils are equal, round, and reactive to light. No scleral icterus.   Neck: Normal range of motion. Neck supple. No JVD present. No thyromegaly present.   Cardiovascular: Normal rate, regular rhythm and intact distal pulses. Exam reveals no gallop and no friction rub.   No murmur heard.  Pulses:       Dorsalis pedis pulses are 2+ on the right side, and 2+ on the left side.        Posterior tibial pulses are 2+ on the right side, and 2+ on the left side.   Pulmonary/Chest: Effort normal and breath sounds normal. No respiratory distress. She has no wheezes. She has no rales.   Abdominal: Soft. Bowel sounds are normal. She exhibits no distension. There is no tenderness. There is no rebound and no guarding.   Musculoskeletal: Normal range of motion. She exhibits no edema.        Right foot: There is normal range of motion and no deformity.        Left foot: There is normal range of motion and no deformity.   Feet:   Right Foot:   Protective Sensation: 8 sites tested. 8 sites sensed.   Skin Integrity: Positive for dry skin. Negative for ulcer, blister, skin breakdown, erythema, warmth or callus.   Left Foot:   Protective Sensation: 8 sites tested. 8 sites sensed.   Skin Integrity: Positive for dry skin. Negative for ulcer, blister, skin breakdown, erythema, warmth or callus.   Lymphadenopathy:     She has no cervical adenopathy.   Neurological: She is alert and oriented to person, place, and time. No cranial nerve deficit or sensory deficit.   Skin: Skin is warm and dry. No rash noted. No erythema.   Psychiatric: She has a normal mood and affect. Her behavior is normal. Thought content normal.       Assessment:       1. Type 1  "diabetes mellitus with complication    2. Essential hypertension    3. Normocytic anemia    4. Bilateral lower extremity edema    5. Lung disease        Plan:   -Today's Visit - Labs ordered last visit, but not yet done.    -Endocrine - T1D (diagnosed in 2004) - Followed at [facility] and next appointment scheduled for 2/27/2019.  Has been uncontrolled.  On Novolog SSI, Levemir 37 units at bedtime.  A1C was 11.9 in 5/2018.  Will repeat fasting labs with A1C.  Will refer to Endocrine at [facility] - appointment is on 5/1/2019.  Her FS glucose is running ok in the morning (180s), but spiking in the middle of the night (218).  Will increase the Levemir to 40 units.  CMP today.    Microalbumin ratio was <20 in 8/2015.  Repeat today.  Last Eye exam - 9/2018 at [facility] and normal per patient.  Last Foot Exam - done today with bilateral dry feet.  Advised moisturizer and to check feet every day.  No s/s of neuropathy.      TSH was normal in 7/2018.    -Cards - HTN - on Losartan 50mg and Metoprolol XL 50mg.   She was prescribed Losartan-HCTZ 100-25 in 8/2018, but patient went back to the Losartan 50mg since she was not "peeing" with the Losartan 50mg.  I recommended she stop the Losartan and restart the Losartan-HCTZ 100-25mg.  BP today is better, but still borderline high.  Will continue with current dose and repeat BP in 2 weeks.    Dizziness as above - resolved.  Orthostatics negative.  Will encourage adequate po fluids.    EKG in 7/2018 with NSR with possible LAE.    Lipids in 5/2018 with , , HDL 77, LDL 98.  Repeat and needs a statin.    -Vascular - Bilateral LE Edema - first noticed in 6/2018.  Placed on Losartan-HCTZ in 8/2018.  Patient did not notice much difference, so changed herself back to Losartan alone.  Check urine microalbumin ratio.  Change back to Losartan-HCTZ 100-25 as above.   Will check bilateral LE dopplers - ordered but not done.     -GI - Chronic Abdominal Cramps and Diarrhea - seen in the ED on " 1/30/2018 at [facility] with above symptoms for 6 months.  Stool WB negative, Stool O&P negative, Stool H pylori negative.  No imaging done.  Had EGD done in 8/2018 at [facility].  Improved.    -Heme - Normocytic Anemia - Hgb 9.1 in 7/2018.  WBC and Plts normal.  History of BALDOMERO.  Repeat CBC and check Fe studies.    -Pulmonary - on Albuterol.  Had PFTs done at [facility] and told she had restrictive lung disease.  She saw a Pulmonary Specialist at [facility].  CT Chest without done in 8/2018 and unremarkable.  Will refer to Pulmonary - appointment for 4/30/2019.    -Psych - Depression/Anxiety - on Celexa since around 2017.    -GYN - Last Pap was on 2/18/2019.   Seen by GYN on 2/18/2019.  We discussed HPV vaccinations - she thinks she did.   We discussed STD screening - HIV 1/2 negative in 10/2016 and will repeat.  On Microgestin FE.    -HCM - We discussed Flu (9/2018) and Tdap (9/2013) vaccinations.  We discussed Pneumococcal (23 - 1/2016) vaccinations.      -Follow Up - 3 months   show

## 2022-06-10 NOTE — COMMITTEE REVIEW
Native Organ Dx: Diabetes Mellitus - Type I      Unable to determine transplant candidacy at this time due to need for Pulmonary follow up with clearance (what's the cause of lung disease, any progression of lung disease). Contact Primary Nephrologist/dialysis unit to optimize fluid removal then repeat echo 6 weeks.  May need Cardiology follow up after volume removal for cardiac right cath.   Patient will be due for   Dr. Broussard will call Pulmonary and Cardiology to dicuss.    Spoken to patient in regards to committee plan.patient voiced understanding.   Spoken to [facility]aug[facility] dialysis unit. He stated that he think patient may already be doing a fluid challenge.   In-basket message sent to Alecia العلي NP (ALLAN). Awaiting response.      Note written by Leah Reyna RN    ===============================================    I was present at the meeting and attest to the decision of the committee.    Tammie Broussard, DO

## 2022-06-12 NOTE — PROGRESS NOTES
HPI     DLS  04/18/2022 by Dr. ARMIN Montaño     Pt here for 1 week post op PPV OD     No pain, states va is slowly progressing     Not positioning face down.  Lying on side only sometimes in day.  Sitting   up and often looks down at phone.  Sleeps left on side at night    Hemoglobin A1C       Date                     Value               Ref Range             Status                10/23/2021               10.9 (H)            4.0 - 5.6 %           Final                       09/17/2021               10.2 (H)            4.0 - 5.6 %           Final                      04/06/2021               10.9 (H)            4.0 - 5.6 %           Final                      PF 4/0  Vig 4/0  Atropine 1/0  Maxitrol matilde HS/0              POHx:   1. 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 (History)            Assessment /Plan     For exam results, see Encounter Report.    There are no diagnoses linked to this encounter.  1. POW #1 s/p PPV/PPL/MP/temporal retinectomy/PFO/EL/AFx/5000cs SO, right eye for recurrent TRD/RRD/PVR right eye  - Doing well   - IOP good  -No AC SO  -Cont PF 4/0  D/c Vig, Atropine, Maxitrol  - No vigorous activity, no lifting, bending, etc.  - Little shield when sleeping  - Little shield, glasses, or sunglasses all times for protection   - No shower   - Face down, left side down positioning for sleeping  - RD/Endophthalmitis/ocular hypertension precautions     Follow up in about 10 days (around 6/20/2022), or if symptoms worsen or fail to improve, for Post-op.         RTC 1 wk, sooner PRN if any problems (laure pain, redness, dimming or loss of vision)

## 2022-06-13 ENCOUNTER — TELEPHONE (OUTPATIENT)
Dept: OBSTETRICS AND GYNECOLOGY | Facility: CLINIC | Age: 27
End: 2022-06-13
Payer: MEDICAID

## 2022-06-13 NOTE — TELEPHONE ENCOUNTER
----- Message from Guadalupe Mcclain sent at 6/13/2022  2:38 PM CDT -----  Regarding: Patient Advice              Name of Who is Calling: Isabela Delaney Jacek    Who Left The Message: Isabela Read      What is the request in detail:      Patient called requesting to schedule her Depo injection; please give a call back and further advise.     Thank you!      Reply by [facility]: Yes      Preferred Call Back :  [phone] (m)

## 2022-06-16 ENCOUNTER — TELEPHONE (OUTPATIENT)
Dept: OBSTETRICS AND GYNECOLOGY | Facility: CLINIC | Age: 27
End: 2022-06-16
Payer: MEDICAID

## 2022-06-16 DIAGNOSIS — Z30.9 ENCOUNTER FOR CONTRACEPTIVE MANAGEMENT, UNSPECIFIED TYPE: Primary | ICD-10-CM

## 2022-06-16 NOTE — TELEPHONE ENCOUNTER
----- Message from Liza Pacheco sent at 6/16/2022 11:20 AM CDT -----      Name of Who is Calling: SIXTO CARNEY [[id number]]      What is the request in detail: Pt returned call to the office.Please contact to further discuss and advise.              Can the clinic reply by [facility]: N      What Number to Call Back [facility]: [phone]

## 2022-06-17 ENCOUNTER — LAB VISIT (OUTPATIENT)
Dept: LAB | Facility: OTHER | Age: 27
End: 2022-06-17
Attending: OBSTETRICS & GYNECOLOGY
Payer: MEDICAID

## 2022-06-17 DIAGNOSIS — Z30.9 ENCOUNTER FOR CONTRACEPTIVE MANAGEMENT, UNSPECIFIED TYPE: ICD-10-CM

## 2022-06-17 LAB — HCG INTACT+B SERPL-ACNC: 2.9 MIU/ML

## 2022-06-17 PROCEDURE — 84702 CHORIONIC GONADOTROPIN TEST: CPT | Mod: NTX | Performed by: OBSTETRICS & GYNECOLOGY

## 2022-06-17 PROCEDURE — 36415 COLL VENOUS BLD VENIPUNCTURE: CPT | Mod: TXP | Performed by: OBSTETRICS & GYNECOLOGY

## 2022-06-19 ENCOUNTER — PATIENT MESSAGE (OUTPATIENT)
Dept: OBSTETRICS AND GYNECOLOGY | Facility: CLINIC | Age: 27
End: 2022-06-19
Payer: MEDICARE

## 2022-06-20 ENCOUNTER — OFFICE VISIT (OUTPATIENT)
Dept: OPHTHALMOLOGY | Facility: CLINIC | Age: 27
End: 2022-06-20
Payer: MEDICARE

## 2022-06-20 DIAGNOSIS — E10.3521: Primary | ICD-10-CM

## 2022-06-20 PROCEDURE — 4010F PR ACE/ARB THEARPY RXD/TAKEN: ICD-10-PCS | Mod: CPTII,,, | Performed by: OPHTHALMOLOGY

## 2022-06-20 PROCEDURE — 3052F HG A1C>EQUAL 8.0%<EQUAL 9.0%: CPT | Mod: CPTII,,, | Performed by: OPHTHALMOLOGY

## 2022-06-20 PROCEDURE — 3052F PR MOST RECENT HEMOGLOBIN A1C LEVEL 8.0 - < 9.0%: ICD-10-PCS | Mod: CPTII,,, | Performed by: OPHTHALMOLOGY

## 2022-06-20 PROCEDURE — 99024 PR POST-OP FOLLOW-UP VISIT: ICD-10-PCS | Mod: ,,, | Performed by: OPHTHALMOLOGY

## 2022-06-20 PROCEDURE — 99999 PR PBB SHADOW E&M-EST. PATIENT-LVL III: ICD-10-PCS | Mod: PBBFAC,,, | Performed by: OPHTHALMOLOGY

## 2022-06-20 PROCEDURE — 99024 POSTOP FOLLOW-UP VISIT: CPT | Mod: ,,, | Performed by: OPHTHALMOLOGY

## 2022-06-20 PROCEDURE — 1159F PR MEDICATION LIST DOCUMENTED IN MEDICAL RECORD: ICD-10-PCS | Mod: CPTII,,, | Performed by: OPHTHALMOLOGY

## 2022-06-20 PROCEDURE — 1160F RVW MEDS BY RX/DR IN RCRD: CPT | Mod: CPTII,,, | Performed by: OPHTHALMOLOGY

## 2022-06-20 PROCEDURE — 1159F MED LIST DOCD IN RCRD: CPT | Mod: CPTII,,, | Performed by: OPHTHALMOLOGY

## 2022-06-20 PROCEDURE — 3066F PR DOCUMENTATION OF TREATMENT FOR NEPHROPATHY: ICD-10-PCS | Mod: CPTII,,, | Performed by: OPHTHALMOLOGY

## 2022-06-20 PROCEDURE — 99999 PR PBB SHADOW E&M-EST. PATIENT-LVL III: CPT | Mod: PBBFAC,,, | Performed by: OPHTHALMOLOGY

## 2022-06-20 PROCEDURE — 3066F NEPHROPATHY DOC TX: CPT | Mod: CPTII,,, | Performed by: OPHTHALMOLOGY

## 2022-06-20 PROCEDURE — 4010F ACE/ARB THERAPY RXD/TAKEN: CPT | Mod: CPTII,,, | Performed by: OPHTHALMOLOGY

## 2022-06-20 PROCEDURE — 1160F PR REVIEW ALL MEDS BY PRESCRIBER/CLIN PHARMACIST DOCUMENTED: ICD-10-PCS | Mod: CPTII,,, | Performed by: OPHTHALMOLOGY

## 2022-06-20 RX ORDER — PREDNISOLONE ACETATE 10 MG/ML
1 SUSPENSION/ DROPS OPHTHALMIC 4 TIMES DAILY
Qty: 15 ML | Refills: 0 | Status: ON HOLD | OUTPATIENT
Start: 2022-06-20 | End: 2022-11-08 | Stop reason: HOSPADM

## 2022-06-20 NOTE — PROGRESS NOTES
HPI     DLS  06/10/95073 by Dr. ARMIN Montaño     Pt here for 2 week post op PPV OD   Vision seems like overall improving OD but fluctuating.  At times can   count fingers    No pain, redness and light sens  Hemoglobin A1C       Date                     Value               Ref Range             Status                05/08/2022               8.3 (H)             4.0 - 5.6 %           Final                  10/23/2021               10.9 (H)            4.0 - 5.6 %           Final                  09/17/2021               10.2 (H)            4.0 - 5.6 %           Final              Eye meds:     PF 4/0              Last edited by Maximilian Montaño MD on 6/20/2022  1:19 PM. (History)            Assessment /Plan     For exam results, see Encounter Report.    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  Dispense: 15 mL; Refill: 0      1. POW 31 s/p PPV/PPL/MP/temporal retinectomy/PFO/EL/AFx/5000cs SO, right eye for recurrent TRD/RRD/PVR right eye  - Doing well   - IOP good  -No AC SO  -Cont PF 4/0  - Face down, left side down positioning for sleeping  - RD/Endophthalmitis/ocular hypertension precautions     Follow up in about 3 weeks (around 7/11/2022), or if symptoms worsen or fail to improve, for Post-op, OCT Mac, Dilated examination BOTH EYES.

## 2022-06-22 RX ORDER — MEDROXYPROGESTERONE ACETATE 150 MG/ML
150 INJECTION, SUSPENSION INTRAMUSCULAR ONCE
Qty: 1 ML | Refills: 3 | Status: ON HOLD | OUTPATIENT
Start: 2022-06-22 | End: 2022-11-08 | Stop reason: HOSPADM

## 2022-06-24 ENCOUNTER — TELEPHONE (OUTPATIENT)
Dept: TRANSPLANT | Facility: CLINIC | Age: 27
End: 2022-06-24
Payer: MEDICARE

## 2022-06-24 ENCOUNTER — TELEPHONE (OUTPATIENT)
Dept: OBSTETRICS AND GYNECOLOGY | Facility: CLINIC | Age: 27
End: 2022-06-24
Payer: MEDICARE

## 2022-06-24 ENCOUNTER — PATIENT MESSAGE (OUTPATIENT)
Dept: OBSTETRICS AND GYNECOLOGY | Facility: CLINIC | Age: 27
End: 2022-06-24
Payer: MEDICARE

## 2022-06-24 DIAGNOSIS — Z30.42 DEPOT CONTRACEPTION: ICD-10-CM

## 2022-06-24 DIAGNOSIS — Z32.02 NEGATIVE PREGNANCY TEST: Primary | ICD-10-CM

## 2022-06-24 DIAGNOSIS — N94.89 OTHER SPECIFIED CONDITIONS ASSOCIATED WITH FEMALE GENITAL ORGANS AND MENSTRUAL CYCLE: ICD-10-CM

## 2022-06-24 NOTE — TELEPHONE ENCOUNTER
----- Message from Tita Bernal MA sent at 6/24/2022 12:21 PM CDT -----  Regarding: FW: miss call    ----- Message -----  From: Ad Isaac  Sent: 6/24/2022  11:43 AM CDT  To: [facility] Pre-Kidney Transplant Non-Clinical  Subject: miss call                                        Pt returning miss call     Call

## 2022-06-27 ENCOUNTER — TELEPHONE (OUTPATIENT)
Dept: PULMONOLOGY | Facility: CLINIC | Age: 27
End: 2022-06-27
Payer: MEDICARE

## 2022-06-27 ENCOUNTER — OFFICE VISIT (OUTPATIENT)
Dept: URGENT CARE | Facility: CLINIC | Age: 27
End: 2022-06-27
Payer: MEDICARE

## 2022-06-27 ENCOUNTER — LAB VISIT (OUTPATIENT)
Dept: LAB | Facility: OTHER | Age: 27
End: 2022-06-27
Attending: OBSTETRICS & GYNECOLOGY
Payer: MEDICARE

## 2022-06-27 VITALS
SYSTOLIC BLOOD PRESSURE: 111 MMHG | OXYGEN SATURATION: 98 % | TEMPERATURE: 98 F | RESPIRATION RATE: 20 BRPM | HEART RATE: 68 BPM | HEIGHT: 64 IN | WEIGHT: 132 LBS | BODY MASS INDEX: 22.53 KG/M2 | DIASTOLIC BLOOD PRESSURE: 78 MMHG

## 2022-06-27 DIAGNOSIS — J02.9 SORE THROAT: ICD-10-CM

## 2022-06-27 DIAGNOSIS — R06.02 SHORTNESS OF BREATH: Primary | ICD-10-CM

## 2022-06-27 DIAGNOSIS — Z30.42 DEPOT CONTRACEPTION: ICD-10-CM

## 2022-06-27 DIAGNOSIS — J02.0 STREP PHARYNGITIS: Primary | ICD-10-CM

## 2022-06-27 DIAGNOSIS — N94.89 OTHER SPECIFIED CONDITIONS ASSOCIATED WITH FEMALE GENITAL ORGANS AND MENSTRUAL CYCLE: ICD-10-CM

## 2022-06-27 DIAGNOSIS — Z32.02 NEGATIVE PREGNANCY TEST: ICD-10-CM

## 2022-06-27 LAB
CTP QC/QA: YES
CTP QC/QA: YES
HCG INTACT+B SERPL-ACNC: <1.2 MIU/ML
MOLECULAR STREP A: POSITIVE
SARS-COV-2 RDRP RESP QL NAA+PROBE: NEGATIVE

## 2022-06-27 PROCEDURE — U0002 COVID-19 LAB TEST NON-CDC: HCPCS | Mod: QW,S$GLB,TXP,

## 2022-06-27 PROCEDURE — 3074F SYST BP LT 130 MM HG: CPT | Mod: CPTII,S$GLB,TXP,

## 2022-06-27 PROCEDURE — 1159F PR MEDICATION LIST DOCUMENTED IN MEDICAL RECORD: ICD-10-PCS | Mod: CPTII,S$GLB,TXP,

## 2022-06-27 PROCEDURE — 99214 OFFICE O/P EST MOD 30 MIN: CPT | Mod: S$GLB,TXP,,

## 2022-06-27 PROCEDURE — 36415 COLL VENOUS BLD VENIPUNCTURE: CPT | Mod: TXP | Performed by: OBSTETRICS & GYNECOLOGY

## 2022-06-27 PROCEDURE — 87651 STREP A DNA AMP PROBE: CPT | Mod: QW,S$GLB,TXP,

## 2022-06-27 PROCEDURE — 3066F NEPHROPATHY DOC TX: CPT | Mod: CPTII,S$GLB,TXP,

## 2022-06-27 PROCEDURE — 84702 CHORIONIC GONADOTROPIN TEST: CPT | Mod: NTX | Performed by: OBSTETRICS & GYNECOLOGY

## 2022-06-27 PROCEDURE — 3078F DIAST BP <80 MM HG: CPT | Mod: CPTII,S$GLB,TXP,

## 2022-06-27 PROCEDURE — U0002: ICD-10-PCS | Mod: QW,S$GLB,TXP,

## 2022-06-27 PROCEDURE — 3008F BODY MASS INDEX DOCD: CPT | Mod: CPTII,S$GLB,TXP,

## 2022-06-27 PROCEDURE — 99214 PR OFFICE/OUTPT VISIT, EST, LEVL IV, 30-39 MIN: ICD-10-PCS | Mod: S$GLB,TXP,,

## 2022-06-27 PROCEDURE — 3052F HG A1C>EQUAL 8.0%<EQUAL 9.0%: CPT | Mod: CPTII,S$GLB,TXP,

## 2022-06-27 PROCEDURE — 3066F PR DOCUMENTATION OF TREATMENT FOR NEPHROPATHY: ICD-10-PCS | Mod: CPTII,S$GLB,TXP,

## 2022-06-27 PROCEDURE — 1160F PR REVIEW ALL MEDS BY PRESCRIBER/CLIN PHARMACIST DOCUMENTED: ICD-10-PCS | Mod: CPTII,S$GLB,TXP,

## 2022-06-27 PROCEDURE — 3078F PR MOST RECENT DIASTOLIC BLOOD PRESSURE < 80 MM HG: ICD-10-PCS | Mod: CPTII,S$GLB,TXP,

## 2022-06-27 PROCEDURE — 87651 POCT STREP A MOLECULAR: ICD-10-PCS | Mod: QW,S$GLB,TXP,

## 2022-06-27 PROCEDURE — 1160F RVW MEDS BY RX/DR IN RCRD: CPT | Mod: CPTII,S$GLB,TXP,

## 2022-06-27 PROCEDURE — 4010F ACE/ARB THERAPY RXD/TAKEN: CPT | Mod: CPTII,S$GLB,TXP,

## 2022-06-27 PROCEDURE — 3052F PR MOST RECENT HEMOGLOBIN A1C LEVEL 8.0 - < 9.0%: ICD-10-PCS | Mod: CPTII,S$GLB,TXP,

## 2022-06-27 PROCEDURE — 3008F PR BODY MASS INDEX (BMI) DOCUMENTED: ICD-10-PCS | Mod: CPTII,S$GLB,TXP,

## 2022-06-27 PROCEDURE — 4010F PR ACE/ARB THEARPY RXD/TAKEN: ICD-10-PCS | Mod: CPTII,S$GLB,TXP,

## 2022-06-27 PROCEDURE — 3074F PR MOST RECENT SYSTOLIC BLOOD PRESSURE < 130 MM HG: ICD-10-PCS | Mod: CPTII,S$GLB,TXP,

## 2022-06-27 PROCEDURE — 1159F MED LIST DOCD IN RCRD: CPT | Mod: CPTII,S$GLB,TXP,

## 2022-06-27 RX ORDER — AMOXICILLIN 500 MG/1
500 TABLET, FILM COATED ORAL EVERY 12 HOURS
Qty: 20 TABLET | Refills: 0 | Status: SHIPPED | OUTPATIENT
Start: 2022-06-27 | End: 2022-07-07

## 2022-06-27 NOTE — PATIENT INSTRUCTIONS
PLEASE READ ALL DISCHARGE INSTRUCTIONS    You will be called with POSITIVE results from today's visit                                                    If your condition worsens or fails to improve we recommend that you receive another evaluation at the ER immediately or contact your PCP to discuss your concerns or return here. You must understand that you've received an urgent care treatment only and that you may be released before all your medical problems are known or treated. You the patient will arrange for followup care as instructed.   The majority of all sore throats or tonsillitis are viral and antibiotics will not treat this.     -Use mouthwash as prescribed    If the strep test performed in office was Positive:  - Complete the full course of antibiotics given  - Drink plenty of cool liquids while avoid any beverage or food that can irritate your throat (acidic, spicy or salty foods).  - Throw away your toothbrush now and when you complete your antibiotics.    Tylenol or ibuprofen for pain may help as long as you are not allergic to these meds or have a medical condition such as stomach ulcers, liver or kidney disease or taking blood thinners etc that would   prevent you from using these medications.   Rest and fluids will help as well.   If you were prescribed antibiotics and are female and on BCP use additional methods to prevent pregnancy while on the antibiotics and for one cycle after.

## 2022-06-27 NOTE — PROGRESS NOTES
"Subjective:       Patient ID: Isabela Read is a 27 y.o. female.    Vitals:  height is 5' 4" (1.626 m) and weight is 59.9 kg (132 lb). Her temperature is 98.3 °F (36.8 °C). Her blood pressure is 111/78 and her pulse is 68. Her respiration is 20 and oxygen saturation is 98%.     Chief Complaint: Sore Throat    Pt presents with complaint of sore throat x2 days.  Pt states she has been using throat lozenges for her sore throat.  Pt states she would like to be tested for covid    Provider note starts below:  Patient presents with sore throat for 2 days. She states she had slight chills the other night (4 days ago) and slept with a fan on overnight. After this, she had a sore throat that she only feels with swallowing. Denies all other sx. Wanted to get COVID test because her mom heard that sore throat was one of the new first sx of COVID. She is concerned due to her many medical issues.     Sore Throat   This is a new problem. The current episode started in the past 7 days. Neither side of throat is experiencing more pain than the other. There has been no fever. The pain is at a severity of 5/10. The pain is moderate. Pertinent negatives include no congestion, coughing, headaches, neck pain, shortness of breath or vomiting. She has had no exposure to strep or mono. Treatments tried: throat lozenges. The treatment provided no relief.       Constitution: Negative for chills and fever.   HENT: Positive for sore throat. Negative for congestion, sinus pain and sinus pressure.    Neck: Negative for neck pain and neck stiffness.   Cardiovascular: Negative for chest pain.   Respiratory: Negative for cough, shortness of breath and wheezing.    Gastrointestinal: Negative for nausea and vomiting.   Musculoskeletal: Negative for muscle ache.   Neurological: Negative for headaches.       Objective:      Physical Exam   Constitutional: She is oriented to person, place, and time. She appears well-developed. She is " cooperative.  Non-toxic appearance. She does not appear ill. No distress.   HENT:   Head: Normocephalic and atraumatic.   Ears:   Right Ear: Hearing, tympanic membrane, external ear and ear canal normal.   Left Ear: Hearing, tympanic membrane, external ear and ear canal normal.   Nose: Nose normal. No mucosal edema, rhinorrhea or nasal deformity. No epistaxis. Right sinus exhibits no maxillary sinus tenderness and no frontal sinus tenderness. Left sinus exhibits no maxillary sinus tenderness and no frontal sinus tenderness.   Mouth/Throat: Uvula is midline and mucous membranes are normal. No trismus in the jaw. Normal dentition. No uvula swelling. Posterior oropharyngeal erythema present. No oropharyngeal exudate or posterior oropharyngeal edema. Tonsils are 2+ on the right. Tonsils are 2+ on the left. No tonsillar exudate.   Eyes: Conjunctivae are normal. No scleral icterus.   Neck: Trachea normal and phonation normal. Neck supple. No edema present. No erythema present. No neck rigidity present.   Cardiovascular: Normal rate, regular rhythm, normal heart sounds and normal pulses.   Pulmonary/Chest: Effort normal and breath sounds normal. No stridor. No respiratory distress. She has no decreased breath sounds. She has no wheezes. She has no rhonchi. She has no rales.   Lungs CTA. No increased respiratory effort. No audible wheezing or stridor         Comments: Lungs CTA. No increased respiratory effort. No audible wheezing or stridor    Abdominal: Normal appearance.   Musculoskeletal: Normal range of motion.         General: No deformity. Normal range of motion.   Lymphadenopathy:     She has no cervical adenopathy.   Neurological: She is alert and oriented to person, place, and time. She exhibits normal muscle tone. Coordination normal.   Skin: Skin is warm, dry, intact, not diaphoretic and not pale.   Psychiatric: Her speech is normal and behavior is normal. Judgment and thought content normal.   Nursing note and  vitals reviewed.        Results for orders placed or performed in visit on 06/27/22   POCT COVID-19 Rapid Screening   Result Value Ref Range    POC Rapid COVID Negative Negative     Acceptable Yes    POCT Strep A, Molecular   Result Value Ref Range    Molecular Strep A, POC Positive (A) Negative     Acceptable Yes      *Note: Due to a large number of results and/or encounters for the requested time period, some results have not been displayed. A complete set of results can be found in Results Review.       Assessment:       1. Strep pharyngitis    2. Sore throat          Plan:     labs reviewed. Patient informed of positive strep results after discharge. Medication verified for renal dosing. Instructed patient to confirm with nephrologist. Advised patient of ED precautions for worsening symptoms. Patient voiced understanding. All questions answered. Patient discharged in NAD.     Strep pharyngitis  -     amoxicillin (AMOXIL) 500 MG Tab; Take 1 tablet (500 mg total) by mouth every 12 (twelve) hours. for 10 days  Dispense: 20 tablet; Refill: 0    Sore throat  -     POCT COVID-19 Rapid Screening  -     POCT Strep A, Molecular  -     (Magic mouthwash) 1:1:1 diphenhydramine(Benadryl) 12.5mg/5ml liq, aluminum & magnesium hydroxide-simethicone (Maalox), LIDOcaine viscous 2%; Swish and spit 5 mLs every 4 (four) hours as needed (gargle and spit for sore throat). for mouth sores  Dispense: 360 mL; Refill: 0          Patient Instructions   PLEASE READ ALL DISCHARGE INSTRUCTIONS    You will be called with POSITIVE results from today's visit                                                    If your condition worsens or fails to improve we recommend that you receive another evaluation at the ER immediately or contact your PCP to discuss your concerns or return here. You must understand that you've received an urgent care treatment only and that you may be released before all your medical problems  are known or treated. You the patient will arrange for followup care as instructed.   The majority of all sore throats or tonsillitis are viral and antibiotics will not treat this.     -Use mouthwash as prescribed    If the strep test performed in office was Positive:  - Complete the full course of antibiotics given  - Drink plenty of cool liquids while avoid any beverage or food that can irritate your throat (acidic, spicy or salty foods).  - Throw away your toothbrush now and when you complete your antibiotics.    Tylenol or ibuprofen for pain may help as long as you are not allergic to these meds or have a medical condition such as stomach ulcers, liver or kidney disease or taking blood thinners etc that would   prevent you from using these medications.   Rest and fluids will help as well.   If you were prescribed antibiotics and are female and on BCP use additional methods to prevent pregnancy while on the antibiotics and for one cycle after.

## 2022-06-30 ENCOUNTER — CLINICAL SUPPORT (OUTPATIENT)
Dept: OBSTETRICS AND GYNECOLOGY | Facility: CLINIC | Age: 27
End: 2022-06-30
Payer: MEDICARE

## 2022-06-30 DIAGNOSIS — Z30.42 SURVEILLANCE OF CONTRACEPTIVE INJECTION: Primary | ICD-10-CM

## 2022-06-30 PROCEDURE — 96372 THER/PROPH/DIAG INJ SC/IM: CPT | Mod: S$GLB,,, | Performed by: OBSTETRICS & GYNECOLOGY

## 2022-06-30 PROCEDURE — 96372 PR INJECTION,THERAP/PROPH/DIAG2ST, IM OR SUBCUT: ICD-10-PCS | Mod: S$GLB,,, | Performed by: OBSTETRICS & GYNECOLOGY

## 2022-06-30 RX ORDER — MEDROXYPROGESTERONE ACETATE 150 MG/ML
150 INJECTION, SUSPENSION INTRAMUSCULAR
Status: COMPLETED | OUTPATIENT
Start: 2022-06-30 | End: 2022-06-30

## 2022-06-30 RX ADMIN — MEDROXYPROGESTERONE ACETATE 150 MG: 150 INJECTION, SUSPENSION INTRAMUSCULAR at 03:06

## 2022-06-30 NOTE — PROGRESS NOTES
Here for Depo Provera injection. PER Dr. LANDIN PT WAS GIVEN MEDICATION BASED ON HCG LEVELS DRAWN 6/27/2022. Patient with no current complaints of pain prior to or post injection. Advised to wait 5 minutes. Return between 9/15-9/29 for next injection.      Site:RD    PATIENT SUPPLIED MEDICATION    See medication card for prescription information.    Order verified, package inspected by nurse, storage verified and expiration verified.

## 2022-07-15 ENCOUNTER — HOSPITAL ENCOUNTER (OUTPATIENT)
Dept: PULMONOLOGY | Facility: CLINIC | Age: 27
Discharge: HOME | End: 2022-07-15
Payer: MEDICARE

## 2022-07-15 ENCOUNTER — PATIENT OUTREACH (OUTPATIENT)
Dept: ADMINISTRATIVE | Facility: OTHER | Age: 27
End: 2022-07-15
Payer: MEDICARE

## 2022-07-15 ENCOUNTER — OFFICE VISIT (OUTPATIENT)
Dept: PULMONOLOGY | Facility: CLINIC | Age: 27
End: 2022-07-15
Payer: MEDICARE

## 2022-07-15 VITALS
OXYGEN SATURATION: 100 % | HEART RATE: 98 BPM | SYSTOLIC BLOOD PRESSURE: 150 MMHG | WEIGHT: 137.56 LBS | HEIGHT: 66 IN | DIASTOLIC BLOOD PRESSURE: 90 MMHG | BODY MASS INDEX: 22.11 KG/M2

## 2022-07-15 DIAGNOSIS — R06.02 SHORTNESS OF BREATH: ICD-10-CM

## 2022-07-15 DIAGNOSIS — Z76.82 ORGAN TRANSPLANT CANDIDATE: ICD-10-CM

## 2022-07-15 DIAGNOSIS — J84.9 INTERSTITIAL PULMONARY DISEASE, UNSPECIFIED: ICD-10-CM

## 2022-07-15 DIAGNOSIS — J98.4 RESTRICTIVE LUNG DISEASE: Chronic | ICD-10-CM

## 2022-07-15 PROCEDURE — 99499 UNLISTED E&M SERVICE: CPT | Mod: S$GLB,,, | Performed by: STUDENT IN AN ORGANIZED HEALTH CARE EDUCATION/TRAINING PROGRAM

## 2022-07-15 PROCEDURE — 94726 PLETHYSMOGRAPHY LUNG VOLUMES: CPT | Mod: S$GLB,TXP,, | Performed by: INTERNAL MEDICINE

## 2022-07-15 PROCEDURE — 4010F ACE/ARB THERAPY RXD/TAKEN: CPT | Mod: CPTII,GC,S$GLB,TXP | Performed by: STUDENT IN AN ORGANIZED HEALTH CARE EDUCATION/TRAINING PROGRAM

## 2022-07-15 PROCEDURE — 1159F MED LIST DOCD IN RCRD: CPT | Mod: CPTII,GC,S$GLB,TXP | Performed by: STUDENT IN AN ORGANIZED HEALTH CARE EDUCATION/TRAINING PROGRAM

## 2022-07-15 PROCEDURE — 1160F PR REVIEW ALL MEDS BY PRESCRIBER/CLIN PHARMACIST DOCUMENTED: ICD-10-PCS | Mod: CPTII,GC,S$GLB,TXP | Performed by: STUDENT IN AN ORGANIZED HEALTH CARE EDUCATION/TRAINING PROGRAM

## 2022-07-15 PROCEDURE — 3080F DIAST BP >= 90 MM HG: CPT | Mod: CPTII,GC,S$GLB,TXP | Performed by: STUDENT IN AN ORGANIZED HEALTH CARE EDUCATION/TRAINING PROGRAM

## 2022-07-15 PROCEDURE — 3066F NEPHROPATHY DOC TX: CPT | Mod: CPTII,GC,S$GLB,TXP | Performed by: STUDENT IN AN ORGANIZED HEALTH CARE EDUCATION/TRAINING PROGRAM

## 2022-07-15 PROCEDURE — 3008F PR BODY MASS INDEX (BMI) DOCUMENTED: ICD-10-PCS | Mod: CPTII,GC,S$GLB,TXP | Performed by: STUDENT IN AN ORGANIZED HEALTH CARE EDUCATION/TRAINING PROGRAM

## 2022-07-15 PROCEDURE — 94010 BREATHING CAPACITY TEST: ICD-10-PCS | Mod: S$GLB,TXP,, | Performed by: INTERNAL MEDICINE

## 2022-07-15 PROCEDURE — 3077F PR MOST RECENT SYSTOLIC BLOOD PRESSURE >= 140 MM HG: ICD-10-PCS | Mod: CPTII,GC,S$GLB,TXP | Performed by: STUDENT IN AN ORGANIZED HEALTH CARE EDUCATION/TRAINING PROGRAM

## 2022-07-15 PROCEDURE — 3077F SYST BP >= 140 MM HG: CPT | Mod: CPTII,GC,S$GLB,TXP | Performed by: STUDENT IN AN ORGANIZED HEALTH CARE EDUCATION/TRAINING PROGRAM

## 2022-07-15 PROCEDURE — 99499 RISK ADDL DX/OHS AUDIT: ICD-10-PCS | Mod: S$GLB,,, | Performed by: STUDENT IN AN ORGANIZED HEALTH CARE EDUCATION/TRAINING PROGRAM

## 2022-07-15 PROCEDURE — 94010 BREATHING CAPACITY TEST: CPT | Mod: S$GLB,TXP,, | Performed by: INTERNAL MEDICINE

## 2022-07-15 PROCEDURE — 4010F PR ACE/ARB THEARPY RXD/TAKEN: ICD-10-PCS | Mod: CPTII,GC,S$GLB,TXP | Performed by: STUDENT IN AN ORGANIZED HEALTH CARE EDUCATION/TRAINING PROGRAM

## 2022-07-15 PROCEDURE — 3066F PR DOCUMENTATION OF TREATMENT FOR NEPHROPATHY: ICD-10-PCS | Mod: CPTII,GC,S$GLB,TXP | Performed by: STUDENT IN AN ORGANIZED HEALTH CARE EDUCATION/TRAINING PROGRAM

## 2022-07-15 PROCEDURE — 3080F PR MOST RECENT DIASTOLIC BLOOD PRESSURE >= 90 MM HG: ICD-10-PCS | Mod: CPTII,GC,S$GLB,TXP | Performed by: STUDENT IN AN ORGANIZED HEALTH CARE EDUCATION/TRAINING PROGRAM

## 2022-07-15 PROCEDURE — 3052F PR MOST RECENT HEMOGLOBIN A1C LEVEL 8.0 - < 9.0%: ICD-10-PCS | Mod: CPTII,GC,S$GLB,TXP | Performed by: STUDENT IN AN ORGANIZED HEALTH CARE EDUCATION/TRAINING PROGRAM

## 2022-07-15 PROCEDURE — 1159F PR MEDICATION LIST DOCUMENTED IN MEDICAL RECORD: ICD-10-PCS | Mod: CPTII,GC,S$GLB,TXP | Performed by: STUDENT IN AN ORGANIZED HEALTH CARE EDUCATION/TRAINING PROGRAM

## 2022-07-15 PROCEDURE — 94726 PULM FUNCT TST PLETHYSMOGRAP: ICD-10-PCS | Mod: S$GLB,TXP,, | Performed by: INTERNAL MEDICINE

## 2022-07-15 PROCEDURE — 99214 PR OFFICE/OUTPT VISIT, EST, LEVL IV, 30-39 MIN: ICD-10-PCS | Mod: GC,S$GLB,TXP, | Performed by: STUDENT IN AN ORGANIZED HEALTH CARE EDUCATION/TRAINING PROGRAM

## 2022-07-15 PROCEDURE — 3052F HG A1C>EQUAL 8.0%<EQUAL 9.0%: CPT | Mod: CPTII,GC,S$GLB,TXP | Performed by: STUDENT IN AN ORGANIZED HEALTH CARE EDUCATION/TRAINING PROGRAM

## 2022-07-15 PROCEDURE — 1160F RVW MEDS BY RX/DR IN RCRD: CPT | Mod: CPTII,GC,S$GLB,TXP | Performed by: STUDENT IN AN ORGANIZED HEALTH CARE EDUCATION/TRAINING PROGRAM

## 2022-07-15 PROCEDURE — 94729 PR C02/MEMBANE DIFFUSE CAPACITY: ICD-10-PCS | Mod: S$GLB,TXP,, | Performed by: INTERNAL MEDICINE

## 2022-07-15 PROCEDURE — 99999 PR PBB SHADOW E&M-EST. PATIENT-LVL V: CPT | Mod: PBBFAC,GC,TXP, | Performed by: STUDENT IN AN ORGANIZED HEALTH CARE EDUCATION/TRAINING PROGRAM

## 2022-07-15 PROCEDURE — 99214 OFFICE O/P EST MOD 30 MIN: CPT | Mod: GC,S$GLB,TXP, | Performed by: STUDENT IN AN ORGANIZED HEALTH CARE EDUCATION/TRAINING PROGRAM

## 2022-07-15 PROCEDURE — 94729 DIFFUSING CAPACITY: CPT | Mod: S$GLB,TXP,, | Performed by: INTERNAL MEDICINE

## 2022-07-15 PROCEDURE — 99999 PR PBB SHADOW E&M-EST. PATIENT-LVL V: ICD-10-PCS | Mod: PBBFAC,GC,TXP, | Performed by: STUDENT IN AN ORGANIZED HEALTH CARE EDUCATION/TRAINING PROGRAM

## 2022-07-15 PROCEDURE — 3008F BODY MASS INDEX DOCD: CPT | Mod: CPTII,GC,S$GLB,TXP | Performed by: STUDENT IN AN ORGANIZED HEALTH CARE EDUCATION/TRAINING PROGRAM

## 2022-07-15 NOTE — PROGRESS NOTES
CC: Dyspnea    HPI:   Ms. Isabela Read is a 27F with poorly controlled DM1 (dx age 8, all recent HbA1c > 12), HTN, CKD, anemia, and restrictive lung disease of unknown etiology who is here to follow up outpatient with pulmonology for her restrictive lung disease. She is currently being worked up for kidney transplant. She has no complaints currently and is without dyspnea, orthopnea, fevers, chills, cough. She remains compliant to all medication and is currently being dialyzed T/R/Sat at [facility]. She has been followed by Pulmonary for restrictive lung disease (unknown etiology), has ASHLEY positivity, but DAXA panel negative. Had cardiac MRI suggestive of restrictive defect by strain pattern, PYP TTR amyloid screen negative.  Last CT chest imaging CTA PE protocol 5/8/2022 (was admitted with HTN emergency and flash pulmonary edema), notable for bilateral GGO and interlobular septal thickening suggestive of CPE. She has notable restriction in PFTs with reduced DLCO, but CT imaging is more suggestive of volume overload and not typical of ILD.      The patient denies chest pain, lower extremity edema, cough, pre-syncope/syncope, GERD symptoms, fevers, chills, night sweats.    A full review of systems was performed and is negative for relevant findings except as outlined above.        PMH:  Past Medical History:   Diagnosis Date    Anemia     Chronic hypertension with exacerbation during pregnancy in second trimester 11/6/2020    Current regimen (11/6/20):  - Carvedilol 12.5 mg BID - Nifedipine 30 mg daily Baseline CKD + proteinuria    Chronic kidney disease     Depression     Diabetes mellitus     Diabetic retinopathy     Diarrhea     Encounter for blood transfusion     Gastroparesis     Glaucoma     Hepatomegaly 4/29/2021    Hx of psychiatric care     Hyperlipidemia     Hypertension     Nephrotic syndrome     Nephrotic syndrome 3/4/2021    Palpitations     Poor fetal growth affecting management of  mother in second trimester 10/15/2020    Restrictive lung disease     Retinal detachment     Severe pre-eclampsia in second trimester 11/6/2020               PSH:  Past Surgical History:   Procedure Laterality Date    AV FISTULA PLACEMENT Left 4/7/2021    Procedure: CREATION, AV FISTULA;  Surgeon: Roberto Ryan MD;  Location: [facility] OR 2ND FLR;  Service: Peripheral Vascular;  Laterality: Left;    COLONOSCOPY N/A 3/16/2022    Procedure: COLONOSCOPY;  Surgeon: Tavo Kwok MD;  Location: [facility] (4TH FLR);  Service: Endoscopy;  Laterality: N/A;  Questionable history of delayed gastric emptying longstanding diabetes now on eating pre transplant workup for history of nausea vomiting which seems to have improved with dialysis also chronic diarrhea and history of anemia pre transplant workup for kidney transplant. 3    ESOPHAGOGASTRODUODENOSCOPY N/A 3/16/2022    Procedure: EGD (ESOPHAGOGASTRODUODENOSCOPY);  Surgeon: Tavo Kwok MD;  Location: [facility] ENDO (4TH FLR);  Service: Endoscopy;  Laterality: N/A;  Questionable history of delayed gastric emptying longstanding diabetes now on eating pre transplant workup for history of nausea vomiting which seems to have improved with dialysis also chronic diarrhea and history of anemia pre transplant workup for    FISTULOGRAM N/A 8/11/2021    Procedure: Fistulogram;  Surgeon: Roberto Ryan MD;  Location: [facility] CATH LAB;  Service: Cardiology;  Laterality: N/A;    LASER PHOTOCOAGULATION OF RETINA Right 5/31/2022    Procedure: PHOTOCOAGULATION, RETINA, USING LASER;  Surgeon: Maximilian Montaño MD;  Location: [facility] OR 1ST FLR;  Service: Ophthalmology;  Laterality: Right;    PERCUTANEOUS TRANSLUMINAL ANGIOPLASTY OF ARTERIOVENOUS FISTULA N/A 8/11/2021    Procedure: PTA, AV FISTULA;  Surgeon: Roberto Ryan MD;  Location: [facility] CATH LAB;  Service: Cardiology;  Laterality: N/A;    REMOVAL IMPLANT, POSTERIOR SEGMENT, INTRAOCULAR Right 2/1/2022     Procedure: REMOVAL IMPLANT, POSTERIOR SEGMENT, INTRAOCULAR;  Surgeon: Maximilian Montaño MD;  Location: [facility] OR 1ST FLR;  Service: Ophthalmology;  Laterality: Right;    REPAIR OF RETINAL DETACHMENT WITH VITRECTOMY Right 1/25/2022    Procedure: REPAIR, RETINAL DETACHMENT, WITH VITRECTOMY, MEMBRANE PEEL, LASER, INJECTION OF GAS VS OIL;  Surgeon: Maximilian Montaño MD;  Location: [facility] OR 1ST FLR;  Service: Ophthalmology;  Laterality: Right;    REVISION OF ARTERIOVENOUS FISTULA Left 12/10/2021    Procedure: REVISION, AV FISTULA with BVT;  Surgeon: Roberto Ryan MD;  Location: [facility] OR 2ND FLR;  Service: Peripheral Vascular;  Laterality: Left;    VITRECTOMY BY PARS PLANA APPROACH Right 2/1/2022    Procedure: VITRECTOMY, PARS PLANA APPROACH;  Surgeon: Maximilian Montaño MD;  Location: [facility] OR 1ST FLR;  Service: Ophthalmology;  Laterality: Right;    VITRECTOMY BY PARS PLANA APPROACH Right 5/31/2022    Procedure: VITRECTOMY, PARS PLANA APPROACH;  Surgeon: Maximilian Montaño MD;  [facility];  Service: Ophthalmology;  Laterality: Right;           Social History     Tobacco Use    Smoking status: Never Smoker    Smokeless tobacco: Never Used   Substance Use Topics    Alcohol use: No    Drug use: No       FH:  Family History   Problem Relation Age of Onset    Hypertension Mother     Heart disease Father     Diabetes Brother     Celiac disease Neg Hx     Cirrhosis Neg Hx     Colon cancer Neg Hx     Colon polyps Neg Hx     Crohn's disease Neg Hx     Inflammatory bowel disease Neg Hx     Liver cancer Neg Hx     Liver disease Neg Hx     Rectal cancer Neg Hx     Stomach cancer Neg Hx     Ulcerative colitis Neg Hx     Esophageal cancer Neg Hx     Hemochromatosis Neg Hx     Pancreatic cancer Neg Hx     Kidney cancer Neg Hx     Bladder Cancer Neg Hx     Uterine cancer Neg Hx     Ovarian cancer Neg Hx         Allergies:  Review of patient's allergies indicates:  No Known  Allergies     Meds:  Prior to Admission medications    Medication Sig Start Date End Date Taking? Authorizing Provider   (Magic mouthwash) 1:1:1 diphenhydramine(Benadryl) 12.5mg/5ml liq, aluminum & magnesium hydroxide-simethicone (Maalox), LIDOcaine viscous 2% Swish and spit 5 mLs every 4 (four) hours as needed (gargle and spit for sore throat). for mouth sores 6/27/22  Yes Keri Jiang PA-C   acetaminophen (TYLENOL) 325 MG tablet Take 1 tablet (325 mg total) by mouth every 6 (six) hours as needed for Pain. 5/31/22  Yes Maximilian Montaño MD   aspirin (ECOTRIN) 81 MG EC tablet Take 81 mg by mouth once daily.   Yes Historical Provider   blood sugar diagnostic Strp To check BG 4 - 6 times daily, to use with insurance preferred meter 5/1/19  Yes Luciana Mayo MD   calcitRIOL (ROCALTROL) 0.5 MCG Cap Take 0.5 mcg by mouth once daily. 7/29/21  Yes Historical Provider   carvediloL (COREG) 25 MG tablet Take 25 mg by mouth 2 (two) times daily with meals.   Yes Historical Provider   flash glucose scanning reader (FREESTYLE MARCY 14 DAY READER) Misc 1 each by Misc.(Non-Drug; Combo Route) route once daily. 3/7/22  Yes Pippa Tan MD   flash glucose sensor (FREESTYLE MARCY 14 DAY SENSOR) Kit 6 kits by Misc.(Non-Drug; Combo Route) route once daily. 3/7/22  Yes Pippa Tan MD   fluticasone propionate (FLONASE) 50 mcg/actuation nasal spray 1 spray by Each Nostril route daily as needed for Rhinitis or Allergies.   Yes Historical Provider   hydrALAZINE (APRESOLINE) 25 MG tablet Take 2 tablets (50 mg total) by mouth every 8 (eight) hours. 5/9/22  Yes Lashanda Vera MD   insulin detemir U-100 (LEVEMIR FLEXTOUCH) 100 unit/mL (3 mL) SubQ InPn pen 10 units subcutaneously in the morning and 12 units sq at night 6/6/22  Yes Luciana Mayo MD   insulin lispro (HUMALOG KWIKPEN INSULIN) 100 unit/mL pen Inject 10 units into skin the skin 3 three times daily with meals 1/27/22  Yes Luciana Mayo MD   lancets [facility] To check BG 4 -  "6 times daily, to use with insurance preferred meter 19  Yes Luciana Mayo MD   multivit-min/folic acid/biotin (HAIR,SKIN AND NAILS,FA-BIOTIN, ORAL) Take 1 tablet by mouth once daily.   Yes Historical Provider   NIFEdipine (PROCARDIA-XL) 60 MG (OSM) 24 hr tablet Take 60 mg by mouth 2 (two) times a day.   Yes Historical Provider   pen needle, diabetic 32 gauge x " Ndle For three times daily injection 19  Yes Luciana Mayo MD   prednisoLONE acetate (PRED FORTE) 1 % DrpS Place 1 drop into the right eye 4 (four) times daily. 22 Yes Maximilian Montaño MD   rosuvastatin (CRESTOR) 10 MG tablet SMARTSI Tablet(s) By Mouth Every Evening 5/15/22  Yes Historical Provider   sevelamer carbonate (RENVELA) 800 mg Tab TAKE 1 TABLET BY MOUTH THREE TIMES DAILY WITH MEALS 22  Yes Oswald Kruger MD   venlafaxine (EFFEXOR XR) 37.5 MG 24 hr capsule Take 1 capsule (37.5 mg total) by mouth once daily. 3/29/22 3/29/23 Yes Cheyenne Thompson MD   medroxyPROGESTERone (DEPO-PROVERA) 150 mg/mL Syrg Inject 1 mL (150 mg total) into the muscle once. for 1 dose 22  Cheyenne Thompson MD   traZODone (DESYREL) 50 MG tablet Take 1 tablet (50 mg total) by mouth nightly as needed for Insomnia. 20  Karina Silverio MD        Physical exam:  Vitals:    07/15/22 1345   BP: (!) 150/90   Pulse: 98        Gen: NAD. AAOx3. Comfortable at rest without dyspnea  HEENT: MMM. No oral lesions. Normal conjunctiva.  Neck: Supple. No JVD. No lymphadenopathy.  Resp: CTA B/L. No wheezes or crackles.  CV: RRR. No murmurs, rubs, gallops  Abd: Soft NT/ND. +BS  Ext: No clubbing, cyanosis, edema.  Skin: No obvious rashes  Neuro: No gross focal deficits.     Prior studies:     CT Chest 3/18/20 - unremarkable, no abnormalities  CT Chest 2022- GGO bilaterally, interlobular septal thickening (hospitalized with HTN emergency, flash pulm edema)     PFTs:   21  FEV1/FVC 81  FEV1 1.55 (51%)  FVC 1.91 " (54%)  TLC 2.82 (53%)  DLCO 14.49 (49%)    6/15/21 with severe restriction and severely reduced DLCO    7/15/2022:                FEV1/FVC  83            FEV1          1.94 (64.9%)           FVC            2.34 (67%)            RV              1.14 (77%)           TLC            3.48 (66%)           DLCO         37.3%            DLCO/Va    67%             My interpretation (most recent): Moderate restriction, severely reduced DLCO. Overall DLCO has decreased, the FVC has improved.       TTE 5/20/21: normal EF, concentric hypertrophy concerning for speckled myocardium and apical sparing concerning for cardiac amyloid. PASP 44     Rheumatoid workup in 8/2020: ASHLEY pos   8/2021: ASHLEY pos, otherwise Ab negative     Plan:  # Restrictive Lung Disease: CT chest reviewed from 5/2022, notable for interlobular septal thickening and GGO bilaterally, note this was during hospitalization. PFTs from today moderate restriction and severely reduced DLCO unadjusted for hemoglobin. Overall, she feels well and continues on thrice weekly HD. At present, no preclusions to going forward with renal transplant eval. She had had PYP TTR amyloid screen and this is negative. Will plan on repeating her CT chest now and can order PFTs in 6mos to follow restriction.  - PFTs in 6mo  - Repeat CT chest now      Tony Plascencia, DO  PGY-V, [employer] PCCM Fellow

## 2022-07-15 NOTE — PROGRESS NOTES
CHW - Outreach Attempt    Community Health Worker left a voicemail message for first attempt to contact patient regarding: SDOH  Community Health Worker to attempt to contact patient on: telephone number [phone]

## 2022-07-18 NOTE — PROGRESS NOTES
Isabela Read is a 27 year old with ESRD and recurrent admissions for hypertensive emergency and hypervolemia.  Previous CT most consistent with flash pulmonary edema, given clinical condition/presentation, BNP, and vitals.  Evaluated for amyloidosis and negative.  She seems to be euvolemic at this time, and we will repeat a CT thorax.  Continue good BP control and perform HD to ensure euvolemia.  PFTs consistent with pulmonary edema.  She lacks constitutional symptoms to suggest infection and is low risk for malignancy. If CT thorax improved, I feel she is a good candidate for renal transplant from a pulmonary perspective.     Please see Dr. Plascencia's note from 7/15/2022 for further details.    Hany Ramos MD  [facility]

## 2022-07-19 ENCOUNTER — TELEPHONE (OUTPATIENT)
Dept: TRANSPLANT | Facility: CLINIC | Age: 27
End: 2022-07-19
Payer: MEDICARE

## 2022-07-19 LAB
DLCO SINGLE BREATH LLN: 23.08
DLCO SINGLE BREATH PRE REF: 37.3 %
DLCO SINGLE BREATH REF: 28.81
DLCOC SBVA LLN: 3.97
DLCOC SBVA REF: 5.46
DLCOC SINGLE BREATH LLN: 23.08
DLCOC SINGLE BREATH REF: 28.81
DLCOCSBVAULN: 6.96
DLCOCSINGLEBREATHULN: 34.55
DLCOSINGLEBREATHULN: 34.55
DLCOVA LLN: 3.97
DLCOVA PRE REF: 67 %
DLCOVA PRE: 3.66 ML/(MIN*MMHG*L) (ref 3.97–6.96)
DLCOVA REF: 5.46
DLCOVAULN: 6.96
ERV LLN: -16448.68
ERV PRE REF: 59.7 %
ERV REF: 1.32
ERVULN: ABNORMAL
FEF 25 75 LLN: 2
FEF 25 75 PRE REF: 65.3 %
FEF 25 75 REF: 3.4
FEV05 LLN: 1.55
FEV05 REF: 2.4
FEV1 FVC LLN: 75
FEV1 FVC PRE REF: 96.4 %
FEV1 FVC REF: 86
FEV1 LLN: 2.34
FEV1 PRE REF: 64.9 %
FEV1 REF: 2.99
FRCPLETH LLN: 1.96
FRCPLETH PREREF: 69.1 %
FRCPLETH REF: 2.78
FRCPLETHULN: 3.6
FVC LLN: 2.73
FVC PRE REF: 67 %
FVC REF: 3.49
IVC PRE: 2.21 L (ref 2.73–4.29)
IVC SINGLE BREATH LLN: 2.73
IVC SINGLE BREATH PRE REF: 63.2 %
IVC SINGLE BREATH REF: 3.49
IVCSINGLEBREATHULN: 4.29
LLN IC: -16447.43
PEF LLN: 5.09
PEF PRE REF: 94 %
PEF REF: 7.23
PHYSICIAN COMMENT: ABNORMAL
PRE DLCO: 10.75 ML/(MIN*MMHG) (ref 23.08–34.55)
PRE ERV: 0.79 L (ref -16448.68–16451.32)
PRE FEF 25 75: 2.22 L/S (ref 2–5.06)
PRE FET 100: 6.27 SEC
PRE FEV05 REF: 69 %
PRE FEV1 FVC: 82.82 % (ref 74.68–94.74)
PRE FEV1: 1.94 L (ref 2.34–3.62)
PRE FEV5: 1.66 L (ref 1.55–3.26)
PRE FRC PL: 1.92 L (ref 1.96–3.6)
PRE FVC: 2.34 L (ref 2.73–4.29)
PRE IC: 1.56 L (ref -16447.43–16452.57)
PRE PEF: 6.79 L/S (ref 5.09–9.36)
PRE REF IC: 60.6 %
PRE RV: 1.14 L (ref 0.89–2.04)
PRE TLC: 3.48 L (ref 4.29–6.26)
PRE VTG: 2.1 L
RAW PRE REF: 142.2 %
RAW PRE: 4.35 CMH2O*S/L (ref 3.06–3.06)
RAW REF: 3.06
REF IC: 2.57
RV LLN: 0.89
RV PRE REF: 77.5 %
RV REF: 1.47
RVTLC LLN: 19
RVTLC PRE REF: 116.1 %
RVTLC PRE: 32.67 % (ref 18.55–37.73)
RVTLC REF: 28
RVTLCULN: 38
RVULN: 2.04
SGAW PRE REF: 100.2 %
SGAW PRE: 0.1 1/(CMH2O*S) (ref 0.1–0.1)
SGAW REF: 0.1
TLC LLN: 4.29
TLC PRE REF: 66 %
TLC REF: 5.27
TLC ULN: 6.26
ULN IC: ABNORMAL
VA PRE: 2.94 L (ref 5.12–5.12)
VA SINGLE BREATH LLN: 5.12
VA SINGLE BREATH PRE REF: 57.4 %
VA SINGLE BREATH REF: 5.12
VASINGLEBREATHULN: 5.12
VC LLN: 2.73
VC PRE REF: 67 %
VC PRE: 2.34 L (ref 2.73–4.29)
VC REF: 3.49
VC ULN: 4.29

## 2022-07-19 NOTE — TELEPHONE ENCOUNTER
----- Message from Alecia Krishna NP sent at 6/14/2022  6:13 PM CDT -----  Regarding: RE: optimize fluid removal  Thank You for letting me know.  We are currently removing max amounts of fluids.  She does not always follow fluid restrictions which is the problem.  She has not been getting to her dry weight as a result.  She has been educated repeatedly.  I will pass information on to staff to reinforce fluid restriction info.  Thank you, Alecia.    ----- Message -----  From: Leah Reyna RN  Sent: 6/10/2022  11:45 AM CDT  To: Alecia Krishna NP  Subject: optimize fluid removal                           Alecia,  This patient is being considered for kidney/pancreas transplant. She was presented to our selection committee today and we were unable to make a decision regarding her transplant candidacy. Per committee, patient will need optimize fluid removal, then a repeat echo 6 weeks.   Can you please let me know when the dialysis unit can start so I can order a repeat echo afterwards?  Leah

## 2022-07-20 ENCOUNTER — PATIENT OUTREACH (OUTPATIENT)
Dept: ADMINISTRATIVE | Facility: OTHER | Age: 27
End: 2022-07-20
Payer: MEDICARE

## 2022-07-25 ENCOUNTER — OFFICE VISIT (OUTPATIENT)
Dept: HEMATOLOGY/ONCOLOGY | Facility: CLINIC | Age: 27
End: 2022-07-25
Payer: MEDICARE

## 2022-07-25 ENCOUNTER — LAB VISIT (OUTPATIENT)
Dept: LAB | Facility: HOSPITAL | Age: 27
End: 2022-07-25
Attending: INTERNAL MEDICINE
Payer: MEDICARE

## 2022-07-25 VITALS
BODY MASS INDEX: 22.52 KG/M2 | OXYGEN SATURATION: 100 % | RESPIRATION RATE: 16 BRPM | WEIGHT: 140.13 LBS | SYSTOLIC BLOOD PRESSURE: 96 MMHG | DIASTOLIC BLOOD PRESSURE: 55 MMHG | HEIGHT: 66 IN | TEMPERATURE: 98 F | HEART RATE: 95 BPM

## 2022-07-25 DIAGNOSIS — Z99.2 ANEMIA IN CHRONIC KIDNEY DISEASE, ON CHRONIC DIALYSIS: ICD-10-CM

## 2022-07-25 DIAGNOSIS — Z99.2 END STAGE RENAL FAILURE ON DIALYSIS: ICD-10-CM

## 2022-07-25 DIAGNOSIS — N18.6 ESRD (END STAGE RENAL DISEASE): Chronic | ICD-10-CM

## 2022-07-25 DIAGNOSIS — D63.1 ANEMIA IN CHRONIC KIDNEY DISEASE, ON CHRONIC DIALYSIS: ICD-10-CM

## 2022-07-25 DIAGNOSIS — N18.5 CKD (CHRONIC KIDNEY DISEASE), STAGE V: ICD-10-CM

## 2022-07-25 DIAGNOSIS — N18.6 END STAGE RENAL FAILURE ON DIALYSIS: ICD-10-CM

## 2022-07-25 DIAGNOSIS — I10 HYPERTENSION, ESSENTIAL: Primary | ICD-10-CM

## 2022-07-25 DIAGNOSIS — N18.6 ANEMIA IN CHRONIC KIDNEY DISEASE, ON CHRONIC DIALYSIS: ICD-10-CM

## 2022-07-25 LAB
BASOPHILS # BLD AUTO: 0.05 K/UL (ref 0–0.2)
BASOPHILS NFR BLD: 0.4 % (ref 0–1.9)
DIFFERENTIAL METHOD: ABNORMAL
EOSINOPHIL # BLD AUTO: 0.1 K/UL (ref 0–0.5)
EOSINOPHIL NFR BLD: 1 % (ref 0–8)
ERYTHROCYTE [DISTWIDTH] IN BLOOD BY AUTOMATED COUNT: 16.9 % (ref 11.5–14.5)
FERRITIN SERPL-MCNC: 343 NG/ML (ref 20–300)
HCT VFR BLD AUTO: 41 % (ref 37–48.5)
HGB BLD-MCNC: 13.6 G/DL (ref 12–16)
IMM GRANULOCYTES # BLD AUTO: 0.13 K/UL (ref 0–0.04)
IMM GRANULOCYTES NFR BLD AUTO: 1.1 % (ref 0–0.5)
IRON SERPL-MCNC: 35 UG/DL (ref 30–160)
LYMPHOCYTES # BLD AUTO: 1.5 K/UL (ref 1–4.8)
LYMPHOCYTES NFR BLD: 12.8 % (ref 18–48)
MCH RBC QN AUTO: 31.5 PG (ref 27–31)
MCHC RBC AUTO-ENTMCNC: 33.2 G/DL (ref 32–36)
MCV RBC AUTO: 95 FL (ref 82–98)
MONOCYTES # BLD AUTO: 0.7 K/UL (ref 0.3–1)
MONOCYTES NFR BLD: 5.8 % (ref 4–15)
NEUTROPHILS # BLD AUTO: 9.1 K/UL (ref 1.8–7.7)
NEUTROPHILS NFR BLD: 78.9 % (ref 38–73)
NRBC BLD-RTO: 0 /100 WBC
PLATELET # BLD AUTO: 261 K/UL (ref 150–450)
PMV BLD AUTO: 11.6 FL (ref 9.2–12.9)
RBC # BLD AUTO: 4.32 M/UL (ref 4–5.4)
RETICS/RBC NFR AUTO: 1.3 % (ref 0.5–2.5)
SATURATED IRON: 13 % (ref 20–50)
TOTAL IRON BINDING CAPACITY: 271 UG/DL (ref 250–450)
TRANSFERRIN SERPL-MCNC: 183 MG/DL (ref 200–375)
WBC # BLD AUTO: 11.51 K/UL (ref 3.9–12.7)

## 2022-07-25 PROCEDURE — 3066F PR DOCUMENTATION OF TREATMENT FOR NEPHROPATHY: ICD-10-PCS | Mod: CPTII,NTX,S$GLB, | Performed by: INTERNAL MEDICINE

## 2022-07-25 PROCEDURE — 99215 PR OFFICE/OUTPT VISIT, EST, LEVL V, 40-54 MIN: ICD-10-PCS | Mod: NTX,S$GLB,, | Performed by: INTERNAL MEDICINE

## 2022-07-25 PROCEDURE — 3066F NEPHROPATHY DOC TX: CPT | Mod: CPTII,NTX,S$GLB, | Performed by: INTERNAL MEDICINE

## 2022-07-25 PROCEDURE — 99215 OFFICE O/P EST HI 40 MIN: CPT | Mod: NTX,S$GLB,, | Performed by: INTERNAL MEDICINE

## 2022-07-25 PROCEDURE — 84466 ASSAY OF TRANSFERRIN: CPT | Mod: NTX | Performed by: INTERNAL MEDICINE

## 2022-07-25 PROCEDURE — 99999 PR PBB SHADOW E&M-EST. PATIENT-LVL III: CPT | Mod: PBBFAC,TXP,, | Performed by: INTERNAL MEDICINE

## 2022-07-25 PROCEDURE — 3008F BODY MASS INDEX DOCD: CPT | Mod: CPTII,NTX,S$GLB, | Performed by: INTERNAL MEDICINE

## 2022-07-25 PROCEDURE — 3074F SYST BP LT 130 MM HG: CPT | Mod: CPTII,NTX,S$GLB, | Performed by: INTERNAL MEDICINE

## 2022-07-25 PROCEDURE — 85025 COMPLETE CBC W/AUTO DIFF WBC: CPT | Mod: TXP | Performed by: INTERNAL MEDICINE

## 2022-07-25 PROCEDURE — 3078F DIAST BP <80 MM HG: CPT | Mod: CPTII,NTX,S$GLB, | Performed by: INTERNAL MEDICINE

## 2022-07-25 PROCEDURE — 4010F ACE/ARB THERAPY RXD/TAKEN: CPT | Mod: CPTII,NTX,S$GLB, | Performed by: INTERNAL MEDICINE

## 2022-07-25 PROCEDURE — 3078F PR MOST RECENT DIASTOLIC BLOOD PRESSURE < 80 MM HG: ICD-10-PCS | Mod: CPTII,NTX,S$GLB, | Performed by: INTERNAL MEDICINE

## 2022-07-25 PROCEDURE — 85045 AUTOMATED RETICULOCYTE COUNT: CPT | Mod: NTX | Performed by: INTERNAL MEDICINE

## 2022-07-25 PROCEDURE — 82728 ASSAY OF FERRITIN: CPT | Mod: TXP | Performed by: INTERNAL MEDICINE

## 2022-07-25 PROCEDURE — 3074F PR MOST RECENT SYSTOLIC BLOOD PRESSURE < 130 MM HG: ICD-10-PCS | Mod: CPTII,NTX,S$GLB, | Performed by: INTERNAL MEDICINE

## 2022-07-25 PROCEDURE — 36415 COLL VENOUS BLD VENIPUNCTURE: CPT | Mod: NTX | Performed by: INTERNAL MEDICINE

## 2022-07-25 PROCEDURE — 3052F HG A1C>EQUAL 8.0%<EQUAL 9.0%: CPT | Mod: CPTII,NTX,S$GLB, | Performed by: INTERNAL MEDICINE

## 2022-07-25 PROCEDURE — 99999 PR PBB SHADOW E&M-EST. PATIENT-LVL III: ICD-10-PCS | Mod: PBBFAC,TXP,, | Performed by: INTERNAL MEDICINE

## 2022-07-25 PROCEDURE — 3052F PR MOST RECENT HEMOGLOBIN A1C LEVEL 8.0 - < 9.0%: ICD-10-PCS | Mod: CPTII,NTX,S$GLB, | Performed by: INTERNAL MEDICINE

## 2022-07-25 PROCEDURE — 4010F PR ACE/ARB THEARPY RXD/TAKEN: ICD-10-PCS | Mod: CPTII,NTX,S$GLB, | Performed by: INTERNAL MEDICINE

## 2022-07-25 PROCEDURE — 3008F PR BODY MASS INDEX (BMI) DOCUMENTED: ICD-10-PCS | Mod: CPTII,NTX,S$GLB, | Performed by: INTERNAL MEDICINE

## 2022-07-25 NOTE — PROGRESS NOTES
CC: Anemia due to ESRD, hematology follow up    HPI:  is a 28 yo F with  T1DM (dx at 8 years old), HTN, restrictive lung disease, recent miscarriage (11/2020) with post-partum depression and CKD5 ( Baseline ~5.4-6.0), on HD. She is being evaluated for kidney transplant. As part of work up, she had US abdomen that showed hepatomegaly. She was evaluated by hepatology. A fibroscan was done to screen for fatty live. It did not confirm steatosis (<11%). It did however suggest F2 fibrosis. MR elastography was done. Fat could not be confirmed becase there was an interfering substance. 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 Iron studies are not elevated:on  2/26/21 ferritin was 32, iron saturation 10%. Hemoglobin electrophoresis was normal in 2020.   She  takes oral iron and recieves iv iron once weekly.   She has received 2 units PRBCs in all, both were given AFTER liver MR elastography in may 2021.      Interval history: She is here for follow up.       Past Medical History:   Diagnosis Date    Anemia     Chronic hypertension with exacerbation during pregnancy in second trimester 11/6/2020    Current regimen (11/6/20):  - Carvedilol 12.5 mg BID - Nifedipine 30 mg daily Baseline CKD + proteinuria    Chronic kidney disease     Depression     Diabetes mellitus     Gastroparesis     Hepatomegaly 4/29/2021    Hyperlipidemia     Hypertension     Nephrotic syndrome 3/4/2021    Palpitations     Poor fetal growth affecting management of mother in second trimester 10/15/2020    Restrictive lung disease     Severe pre-eclampsia in second trimester 11/6/2020         Past Surgical History:   Procedure Laterality Date    AV FISTULA PLACEMENT Left 4/7/2021    Procedure: CREATION, AV FISTULA;  Surgeon: Roberto Ryan MD;  Location: [facility] OR [address] [place] [address];   Service: Peripheral Vascular;  Laterality: Left;    COLONOSCOPY N/A 3/16/2022    Procedure: COLONOSCOPY;  Surgeon: Tavo Kwok MD;  Location: [facility] (4TH FLR);  Service: Endoscopy;  Laterality: N/A;  Questionable history of delayed gastric emptying longstanding diabetes now on eating pre transplant workup for history of nausea vomiting which seems to have improved with dialysis also chronic diarrhea and history of anemia pre transplant workup for kidney transplant. 3    ESOPHAGOGASTRODUODENOSCOPY N/A 3/16/2022    Procedure: EGD (ESOPHAGOGASTRODUODENOSCOPY);  Surgeon: Tavo Kwok MD;  Location: [facility] ENDO (4TH FLR);  Service: Endoscopy;  Laterality: N/A;  Questionable history of delayed gastric emptying longstanding diabetes now on eating pre transplant workup for history of nausea vomiting which seems to have improved with dialysis also chronic diarrhea and history of anemia pre transplant workup for    FISTULOGRAM N/A 8/11/2021    Procedure: Fistulogram;  Surgeon: Roberto Ryan MD;  Location: [facility] CATH LAB;  Service: Cardiology;  Laterality: N/A;    LASER PHOTOCOAGULATION OF RETINA Right 5/31/2022    Procedure: PHOTOCOAGULATION, RETINA, USING LASER;  Surgeon: Maximilian Montaño MD;  Location: [facility] OR [address] [place] [address];  Service: Ophthalmology;  Laterality: Right;    PERCUTANEOUS TRANSLUMINAL ANGIOPLASTY OF ARTERIOVENOUS FISTULA N/A 8/11/2021    Procedure: PTA, AV FISTULA;  Surgeon: Roberto Ryan MD;  Location: [facility] CATH LAB;  Service: Cardiology;  Laterality: N/A;    REMOVAL IMPLANT, POSTERIOR SEGMENT, INTRAOCULAR Right 2/1/2022    Procedure: REMOVAL IMPLANT, POSTERIOR SEGMENT, INTRAOCULAR;  Surgeon: Maximilian Montaño MD;  Location: [facility] OR [address] [place] [address];  Service: Ophthalmology;  Laterality: Right;    REPAIR OF RETINAL DETACHMENT WITH VITRECTOMY Right 1/25/2022    Procedure: REPAIR, RETINAL DETACHMENT, WITH VITRECTOMY, MEMBRANE PEEL, LASER, INJECTION OF GAS VS OIL;  Surgeon: Maximilian REYNOLDS  MD Sabra;  Location: [facility] OR 1ST FLR;  Service: Ophthalmology;  Laterality: Right;    REVISION OF ARTERIOVENOUS FISTULA Left 12/10/2021    Procedure: REVISION, AV FISTULA with BVT;  Surgeon: Roberto Ryan MD;  Location: [facility] OR 2ND FLR;  Service: Peripheral Vascular;  Laterality: Left;    VITRECTOMY BY PARS PLANA APPROACH Right 2/1/2022    Procedure: VITRECTOMY, PARS PLANA APPROACH;  Surgeon: Maximilian Montaño MD;  Location: [facility] OR 1ST FLR;  Service: Ophthalmology;  Laterality: Right;    VITRECTOMY BY PARS PLANA APPROACH Right 5/31/2022    Procedure: VITRECTOMY, PARS PLANA APPROACH;  Surgeon: Maximilian Montaño MD;  Location: [facility] OR 1ST FLR;  Service: Ophthalmology;  Laterality: Right;         Social History     Socioeconomic History    Marital status: Single   Occupational History    Occupation:  at [employer]   Tobacco Use    Smoking status: Never Smoker    Smokeless tobacco: Never Used   Substance and Sexual Activity    Alcohol use: No    Drug use: No    Sexual activity: Not Currently     Partners: Male     Comment: monogamous   Social History Narrative    Caregiver        Single    No kids    Had Miscarriage  At 23 weeks from preeclampsia    Disabled         Review of patient's allergies indicates:  No Known Allergies      Family History   Problem Relation Age of Onset    Hypertension Mother     Heart disease Father     Diabetes Brother     Celiac disease Neg Hx     Cirrhosis Neg Hx     Colon cancer Neg Hx     Colon polyps Neg Hx     Crohn's disease Neg Hx     Inflammatory bowel disease Neg Hx     Liver cancer Neg Hx     Liver disease Neg Hx     Rectal cancer Neg Hx     Stomach cancer Neg Hx     Ulcerative colitis Neg Hx     Esophageal cancer Neg Hx     Hemochromatosis Neg Hx     Pancreatic cancer Neg Hx     Kidney cancer Neg Hx     Bladder Cancer Neg Hx     Uterine cancer Neg Hx     Ovarian cancer Neg Hx          Current Outpatient Medications   Medication  Sig    aspirin (ECOTRIN) 81 MG EC tablet Take 1 tablet (81 mg total) by mouth every evening.    carvediloL (COREG) 25 MG tablet Take 1 tablet (25 mg total) by mouth 2 (two) times daily.    escitalopram oxalate (LEXAPRO) 20 MG tablet Take 1 tablet (20 mg total) by mouth once daily. (Patient taking differently: Take 20 mg by mouth daily as needed (depression/anxiety). )    ferrous sulfate 325 (65 FE) MG EC tablet Take 1 tablet (325 mg total) by mouth 2 (two) times daily.    hydrALAZINE (APRESOLINE) 25 MG tablet Take 1 tablet (25 mg total) by mouth 2 (two) times a day.    insulin detemir U-100 (LEVEMIR FLEXTOUCH) 100 unit/mL (3 mL) SubQ InPn pen INJECT 10 UNITS under the skin in the morning and 12 UNITS at night    insulin lispro (HUMALOG KWIKPEN INSULIN) 100 unit/mL pen Inject 10 units into skin the skin 3 three times daily with meals (Patient taking differently: 1- 10 units with meals and 1 per 35 units over 120 for correction dose)    NIFEdipine (PROCARDIA-XL) 60 MG (OSM) 24 hr tablet Take 1 tablet (60 mg total) by mouth 2 (two) times a day.    promethazine (PHENERGAN) 12.5 MG Tab Take 1 tablet (12.5 mg total) by mouth 4 (four) times daily.    rosuvastatin (CRESTOR) 10 MG tablet Take 1 tablet (10 mg total) by mouth every evening.    sevelamer carbonate (RENVELA) 800 mg Tab Take 2 tablets (1,600 mg total) by mouth 3 (three) times daily with meals.    traZODone (DESYREL) 50 MG tablet Take 1 tablet (50 mg total) by mouth nightly as needed for Insomnia.     No current facility-administered medications for this visit.         Review of Systems   Constitutional: Positive for malaise/fatigue. Negative for chills, fever and weight loss.   HENT: Negative for congestion, ear discharge, hearing loss, nosebleeds and tinnitus.    Eyes: Positive for blurred vision and redness. Negative for double vision, photophobia and pain.   Respiratory: Negative for cough, sputum production and shortness of breath.     Cardiovascular: Negative for chest pain, orthopnea, claudication and leg swelling.   Gastrointestinal: Negative for abdominal pain, blood in stool, constipation, diarrhea, nausea and vomiting.   Genitourinary: Negative for dysuria, frequency and urgency.   Musculoskeletal: Negative for back pain and myalgias.   Neurological: Negative for dizziness, tingling, tremors and headaches.   Endo/Heme/Allergies: Negative for environmental allergies.   Psychiatric/Behavioral: Negative for depression, hallucinations and substance abuse. The patient is not nervous/anxious.        Vitals:    07/25/22 0951   BP: (!) 96/55   Pulse: 95   Resp: 16   Temp: 98.3 °F (36.8 °C)           Physical Exam  HENT:      Head: Normocephalic and atraumatic.   Eyes:      Comments: Right eye appears congested   Cardiovascular:      Rate and Rhythm: Normal rate and regular rhythm.      Heart sounds: No murmur heard.     Comments: AV fistula  In left arm  Pulmonary:      Effort: Pulmonary effort is normal. No respiratory distress.   Abdominal:      General: There is no distension.      Palpations: There is no mass.      Tenderness: There is no abdominal tenderness.   Musculoskeletal:         General: No swelling.   Lymphadenopathy:      Cervical: No cervical adenopathy.   Skin:     General: Skin is warm.      Coloration: Skin is not jaundiced.   Neurological:      General: No focal deficit present.      Mental Status: She is alert and oriented to person, place, and time.      Cranial Nerves: No cranial nerve deficit.       5/18/21 MR elastography    TECHNIQUE:  MRI abdomen performed without contrast per MR elastography protocol.     COMPARISON:  Ultrasound abdomen 04/06/2021.     FINDINGS:  Noncontrast evaluation of the lower thorax and abdomen:     Small volume bilateral pleural effusions.  Liver is enlarged measuring 21 cm craniocaudal.  There is mildly increased signal involving the hepatic and splenic parenchyma on out of phase imaging,  suggestive of iron overload.  No focal hepatic lesion.     1.0 cm right renal cyst.     The pancreas, spleen, and adrenal glands are within normal limits.  No hydronephrosis.  Gallbladder is relatively decompressed without filling defects.  No biliary ductal dilatation.  Visualized loops of bowel are unremarkable.     There is body wall edema and skin thickening.     The liver fat fraction measurements were unable to be measured and are nondiagnostic.     Liver elasticity measures 1.75 kPa consistent with Normal or F0 fibrosis.     Impression:     1. Due to technical difficulties, liver fat fraction measurements were nondiagnostic and unable to be measured.  2. Liver elasticity measures 1.75 kPa consistent with Normal or F0 fibrosis.  3. 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  Correlation with clinical history recommended.  4. Additional incidental findings, as above.  Component      Latest Ref Rng & Units 3/21/2022   WBC      3.90 - 12.70 K/uL    RBC      4.00 - 5.40 M/uL    Hemoglobin      12.0 - 16.0 g/dL    Hematocrit      37.0 - 48.5 %    MCV      82 - 98 fL    MCH      27.0 - 31.0 pg    MCHC      32.0 - 36.0 g/dL    RDW      11.5 - 14.5 %    Platelets      150 - 450 K/uL    MPV      9.2 - 12.9 fL    Immature Granulocytes      0.0 - 0.5 %    Gran # (ANC)      1.8 - 7.7 K/uL    Immature Grans (Abs)      0.00 - 0.04 K/uL    Lymph #      1.0 - 4.8 K/uL    Mono #      0.3 - 1.0 K/uL    Eos #      0.0 - 0.5 K/uL    Baso #      0.00 - 0.20 K/uL    nRBC      0 /100 WBC    Gran %      38.0 - 73.0 %    Lymph %      18.0 - 48.0 %    Mono %      4.0 - 15.0 %    Eosinophil %      0.0 - 8.0 %    Basophil %      0.0 - 1.9 %    Differential Method          Sodium      136 - 145 mmol/L    Potassium      3.5 - 5.1 mmol/L    Chloride      95 - 110 mmol/L    CO2      23 - 29 mmol/L    Glucose      70 - 110 mg/dL     BUN      6 - 20 mg/dL    Creatinine      0.5 - 1.4 mg/dL    Calcium      8.7 - 10.5 mg/dL    PROTEIN TOTAL      6.0 - 8.4 g/dL    Albumin      3.5 - 5.2 g/dL    BILIRUBIN TOTAL      0.1 - 1.0 mg/dL    Alkaline Phosphatase      55 - 135 U/L    AST      10 - 40 U/L    ALT      10 - 44 U/L    Anion Gap      8 - 16 mmol/L    eGFR if African American      >60 mL/min/1.73 m:2    eGFR if non African American      >60 mL/min/1.73 m:2    Kappa Free Light Chains      0.33 - 1.94 mg/dL 20.05 (H)   Lambda Free Light Chains      0.57 - 2.63 mg/dL 12.79 (H)   Kappa/Lambda FLC Ratio      0.26 - 1.65 1.57       3/21/22 sIFE: No monoclonal peaks identified.     Component      Latest Ref Rng & Units 5/25/2022   WBC      3.90 - 12.70 K/uL 4.39   RBC      4.00 - 5.40 M/uL 3.14 (L)   Hemoglobin      12.0 - 16.0 g/dL 9.1 (L)   Hematocrit      37.0 - 48.5 % 28.7 (L)   MCV      82 - 98 fL 91   MCH      27.0 - 31.0 pg 29.0   MCHC      32.0 - 36.0 g/dL 31.7 (L)   RDW      11.5 - 14.5 % 16.3 (H)   Platelets      150 - 450 K/uL 257   MPV      9.2 - 12.9 fL 12.0   Immature Granulocytes      0.0 - 0.5 % 0.2   Gran # (ANC)      1.8 - 7.7 K/uL 2.2   Immature Grans (Abs)      0.00 - 0.04 K/uL 0.01   Lymph #      1.0 - 4.8 K/uL 1.6   Mono #      0.3 - 1.0 K/uL 0.5   Eos #      0.0 - 0.5 K/uL 0.2   Baso #      0.00 - 0.20 K/uL 0.04   nRBC      0 /100 WBC 0   Gran %      38.0 - 73.0 % 49.0   Lymph %      18.0 - 48.0 % 35.5   Mono %      4.0 - 15.0 % 10.5   Eosinophil %      0.0 - 8.0 % 3.9   Basophil %      0.0 - 1.9 % 0.9   Differential Method       Automated   Sodium      136 - 145 mmol/L 138   Potassium      3.5 - 5.1 mmol/L 5.3 (H)   Chloride      95 - 110 mmol/L 94 (L)   CO2      23 - 29 mmol/L 30 (H)   Glucose      70 - 110 mg/dL 172 (H)   BUN      6 - 20 mg/dL 33 (H)   Creatinine      0.5 - 1.4 mg/dL 5.9 (H)   Calcium      8.7 - 10.5 mg/dL 8.7   PROTEIN TOTAL      6.0 - 8.4 g/dL 7.3   Albumin      3.5 - 5.2 g/dL 3.6   BILIRUBIN TOTAL      0.1  - 1.0 mg/dL 0.9   Alkaline Phosphatase      55 - 135 U/L 91   AST      10 - 40 U/L 18   ALT      10 - 44 U/L 21   Anion Gap      8 - 16 mmol/L 14   eGFR if African American      >60 mL/min/1.73 m:2 10.4 (A)   eGFR if non African American      >60 mL/min/1.73 m:2 9.1 (A)         Assessment:    1. Abnormal liver MR elastography  2. Normocytic anemia  3. ESRD on HD  4. Fatigue  5. Type 1 DM   6. Renal transplant candidate      Plan:    1. Serum ferritin was normal, but TIBC was low in may 2021, suggestive of iron overload. Etiology is iatrogenic, as she was oral and iv iron. She had normal hemoglobin electrophoresis in 2020.      2. She was previously on iv iron. She gets it intermittently with HD. She will have CBC, iron, TIBC, ferritin checked today. She is not on procrit. SPEP, GEORGE normal. No monoclonal protein identified. sFLCs are elevated due to renal dysfunction, but ratio is normal.     5. She follows with endocrinology      6. She is being followed at the renal transplant clinic.            BMT Chart Routing      Follow up with physician . Labs today   Follow up with LAURA    Infusion scheduling note    Injection scheduling note    Labs CBC, CMP, iron and TIBC, ferritin and other   Lab interval:  [facility]   Imaging    Pharmacy appointment    Other referrals

## 2022-07-26 ENCOUNTER — PATIENT MESSAGE (OUTPATIENT)
Dept: ENDOCRINOLOGY | Facility: CLINIC | Age: 27
End: 2022-07-26

## 2022-07-27 ENCOUNTER — HOSPITAL ENCOUNTER (OUTPATIENT)
Dept: RADIOLOGY | Facility: HOSPITAL | Age: 27
Discharge: HOME | End: 2022-07-27
Attending: STUDENT IN AN ORGANIZED HEALTH CARE EDUCATION/TRAINING PROGRAM
Payer: MEDICARE

## 2022-07-27 DIAGNOSIS — J84.9 INTERSTITIAL PULMONARY DISEASE, UNSPECIFIED: ICD-10-CM

## 2022-07-27 PROCEDURE — 71250 CT THORAX DX C-: CPT | Mod: TC,TXP

## 2022-07-27 PROCEDURE — 71250 CT THORAX DX C-: CPT | Mod: 26,TXP,, | Performed by: RADIOLOGY

## 2022-07-27 PROCEDURE — 71250 CT CHEST WITHOUT CONTRAST: ICD-10-PCS | Mod: 26,TXP,, | Performed by: RADIOLOGY

## 2022-08-01 ENCOUNTER — HOSPITAL ENCOUNTER (EMERGENCY)
Facility: HOSPITAL | Age: 27
Discharge: HOME | End: 2022-08-01
Attending: EMERGENCY MEDICINE
Payer: MEDICARE

## 2022-08-01 VITALS
DIASTOLIC BLOOD PRESSURE: 94 MMHG | SYSTOLIC BLOOD PRESSURE: 168 MMHG | BODY MASS INDEX: 24.57 KG/M2 | HEIGHT: 64 IN | TEMPERATURE: 99 F | WEIGHT: 143.94 LBS | OXYGEN SATURATION: 96 % | HEART RATE: 110 BPM | RESPIRATION RATE: 16 BRPM

## 2022-08-01 DIAGNOSIS — J81.0 ACUTE PULMONARY EDEMA: Primary | ICD-10-CM

## 2022-08-01 DIAGNOSIS — Z99.2 ESRD (END STAGE RENAL DISEASE) ON DIALYSIS: ICD-10-CM

## 2022-08-01 DIAGNOSIS — N18.6 ESRD (END STAGE RENAL DISEASE) ON DIALYSIS: ICD-10-CM

## 2022-08-01 LAB
ALBUMIN SERPL BCP-MCNC: 3.6 G/DL (ref 3.5–5.2)
ALP SERPL-CCNC: 116 U/L (ref 55–135)
ALT SERPL W/O P-5'-P-CCNC: 15 U/L (ref 10–44)
ANION GAP SERPL CALC-SCNC: 19 MMOL/L (ref 8–16)
AST SERPL-CCNC: 17 U/L (ref 10–40)
BASOPHILS # BLD AUTO: 0.04 K/UL (ref 0–0.2)
BASOPHILS NFR BLD: 0.3 % (ref 0–1.9)
BILIRUB SERPL-MCNC: 0.6 MG/DL (ref 0.1–1)
BUN SERPL-MCNC: 68 MG/DL (ref 6–20)
CALCIUM SERPL-MCNC: 8.6 MG/DL (ref 8.7–10.5)
CHLORIDE SERPL-SCNC: 94 MMOL/L (ref 95–110)
CO2 SERPL-SCNC: 22 MMOL/L (ref 23–29)
CREAT SERPL-MCNC: 8.4 MG/DL (ref 0.5–1.4)
DIFFERENTIAL METHOD: ABNORMAL
EOSINOPHIL # BLD AUTO: 0.1 K/UL (ref 0–0.5)
EOSINOPHIL NFR BLD: 1 % (ref 0–8)
ERYTHROCYTE [DISTWIDTH] IN BLOOD BY AUTOMATED COUNT: 15.9 % (ref 11.5–14.5)
EST. GFR  (NO RACE VARIABLE): 6.2 ML/MIN/1.73 M^2
GLUCOSE SERPL-MCNC: 370 MG/DL (ref 70–110)
HCT VFR BLD AUTO: 33.2 % (ref 37–48.5)
HGB BLD-MCNC: 11.3 G/DL (ref 12–16)
IMM GRANULOCYTES # BLD AUTO: 0.05 K/UL (ref 0–0.04)
IMM GRANULOCYTES NFR BLD AUTO: 0.4 % (ref 0–0.5)
LYMPHOCYTES # BLD AUTO: 1.7 K/UL (ref 1–4.8)
LYMPHOCYTES NFR BLD: 13.4 % (ref 18–48)
MAGNESIUM SERPL-MCNC: 2.6 MG/DL (ref 1.6–2.6)
MCH RBC QN AUTO: 30.9 PG (ref 27–31)
MCHC RBC AUTO-ENTMCNC: 34 G/DL (ref 32–36)
MCV RBC AUTO: 91 FL (ref 82–98)
MONOCYTES # BLD AUTO: 0.7 K/UL (ref 0.3–1)
MONOCYTES NFR BLD: 5.9 % (ref 4–15)
NEUTROPHILS # BLD AUTO: 9.8 K/UL (ref 1.8–7.7)
NEUTROPHILS NFR BLD: 79 % (ref 38–73)
NRBC BLD-RTO: 0 /100 WBC
PLATELET # BLD AUTO: 269 K/UL (ref 150–450)
PMV BLD AUTO: 11.9 FL (ref 9.2–12.9)
POTASSIUM SERPL-SCNC: 4.7 MMOL/L (ref 3.5–5.1)
PROT SERPL-MCNC: 8.3 G/DL (ref 6–8.4)
RBC # BLD AUTO: 3.66 M/UL (ref 4–5.4)
SODIUM SERPL-SCNC: 135 MMOL/L (ref 136–145)
WBC # BLD AUTO: 12.4 K/UL (ref 3.9–12.7)

## 2022-08-01 PROCEDURE — 80053 COMPREHEN METABOLIC PANEL: CPT | Mod: NTX | Performed by: EMERGENCY MEDICINE

## 2022-08-01 PROCEDURE — 85025 COMPLETE CBC W/AUTO DIFF WBC: CPT | Mod: NTX | Performed by: EMERGENCY MEDICINE

## 2022-08-01 PROCEDURE — 99283 EMERGENCY DEPT VISIT LOW MDM: CPT | Mod: 25,NTX

## 2022-08-01 PROCEDURE — 99284 PR EMERGENCY DEPT VISIT,LEVEL IV: ICD-10-PCS | Mod: NTX,,, | Performed by: EMERGENCY MEDICINE

## 2022-08-01 PROCEDURE — 83735 ASSAY OF MAGNESIUM: CPT | Mod: NTX | Performed by: EMERGENCY MEDICINE

## 2022-08-01 PROCEDURE — 99284 EMERGENCY DEPT VISIT MOD MDM: CPT | Mod: NTX,,, | Performed by: EMERGENCY MEDICINE

## 2022-08-01 PROCEDURE — 25000003 PHARM REV CODE 250: Mod: NTX | Performed by: EMERGENCY MEDICINE

## 2022-08-01 PROCEDURE — 82962 GLUCOSE BLOOD TEST: CPT | Mod: NTX

## 2022-08-01 RX ORDER — ONDANSETRON 4 MG/1
4 TABLET, ORALLY DISINTEGRATING ORAL
Status: COMPLETED | OUTPATIENT
Start: 2022-08-01 | End: 2022-08-01

## 2022-08-01 RX ADMIN — ONDANSETRON 4 MG: 4 TABLET, ORALLY DISINTEGRATING ORAL at 12:08

## 2022-08-01 NOTE — ED TRIAGE NOTES
Patient presents to the ER with complaints of having hypotension when she went to receive her dialysis on Saturday. Patient states she didn't get her full treatment, she only got about 1L off. Patient denies any pain at this time. Patient endorses shortness of breath.

## 2022-08-01 NOTE — ED PROVIDER NOTES
Encounter Date: 8/1/2022       History     Chief Complaint   Patient presents with    dialysis     1/2 dialysis tx on sat due to low bp, wanting dialysis today,      27-year-old female with a history of type 1 diabetes on hemodialysis presents with slight shortness of breath.  On Saturday she only had half her dialysis session because of hypotension.  She is worried she is developing pulmonary edema and needs emergent dialysis.  Her next dialysis session is Tuesday.  Patient denies nausea, vomiting, diarrhea, fever, cough, chest pain, abdominal pain, or dysuria.  A ten point review of systems was completed and is negative except as documented above.  Patient denies any other acute medical complaint.  The patients available PMH, PSH, Social History, medications, allergies, and triage vital signs were reviewed just prior to their medical evaluation.        Review of patient's allergies indicates:  No Known Allergies  Past Medical History:   Diagnosis Date    Anemia     Chronic hypertension with exacerbation during pregnancy in second trimester 11/6/2020    Current regimen (11/6/20):  - Carvedilol 12.5 mg BID - Nifedipine 30 mg daily Baseline CKD + proteinuria    Chronic kidney disease     Depression     Diabetes mellitus     Diabetic retinopathy     Diarrhea     Encounter for blood transfusion     Gastroparesis     Glaucoma     Hepatomegaly 4/29/2021    Hx of psychiatric care     Hyperlipidemia     Hypertension     Nephrotic syndrome     Nephrotic syndrome 3/4/2021    Palpitations     Poor fetal growth affecting management of mother in second trimester 10/15/2020    Restrictive lung disease     Retinal detachment     Severe pre-eclampsia in second trimester 11/6/2020     Past Surgical History:   Procedure Laterality Date    AV FISTULA PLACEMENT Left 4/7/2021    Procedure: CREATION, AV FISTULA;  Surgeon: Roberto Ryan MD;  Location: [facility] OR [address] [place] [address];  Service: Peripheral Vascular;   Laterality: Left;    COLONOSCOPY N/A 3/16/2022    Procedure: COLONOSCOPY;  Surgeon: Tavo Kwok MD;  Location: [facility] ENDO (4TH FLR);  Service: Endoscopy;  Laterality: N/A;  Questionable history of delayed gastric emptying longstanding diabetes now on eating pre transplant workup for history of nausea vomiting which seems to have improved with dialysis also chronic diarrhea and history of anemia pre transplant workup for kidney transplant. 3    ESOPHAGOGASTRODUODENOSCOPY N/A 3/16/2022    Procedure: EGD (ESOPHAGOGASTRODUODENOSCOPY);  Surgeon: Tavo Kwok MD;  Location: [facility] ENDO (4TH FLR);  Service: Endoscopy;  Laterality: N/A;  Questionable history of delayed gastric emptying longstanding diabetes now on eating pre transplant workup for history of nausea vomiting which seems to have improved with dialysis also chronic diarrhea and history of anemia pre transplant workup for    FISTULOGRAM N/A 8/11/2021    Procedure: Fistulogram;  Surgeon: Roberto Ryan MD;  Location: [facility] CATH LAB;  Service: Cardiology;  Laterality: N/A;    LASER PHOTOCOAGULATION OF RETINA Right 5/31/2022    Procedure: PHOTOCOAGULATION, RETINA, USING LASER;  Surgeon: Maximilian Montaño MD;  [facility];  Service: Ophthalmology;  Laterality: Right;    PERCUTANEOUS TRANSLUMINAL ANGIOPLASTY OF ARTERIOVENOUS FISTULA N/A 8/11/2021    Procedure: PTA, AV FISTULA;  Surgeon: Roberto Ryan MD;  Location: [facility] CATH LAB;  Service: Cardiology;  Laterality: N/A;    REMOVAL IMPLANT, POSTERIOR SEGMENT, INTRAOCULAR Right 2/1/2022    Procedure: REMOVAL IMPLANT, POSTERIOR SEGMENT, INTRAOCULAR;  Surgeon: Maximilian Montaño MD;  [facility];  Service: Ophthalmology;  Laterality: Right;    REPAIR OF RETINAL DETACHMENT WITH VITRECTOMY Right 1/25/2022    Procedure: REPAIR, RETINAL DETACHMENT, WITH VITRECTOMY, MEMBRANE PEEL, LASER, INJECTION OF GAS VS OIL;  Surgeon: Maximilian Montaño MD;  Location: [facility]  OR 1ST FLR;  Service: Ophthalmology;  Laterality: Right;    REVISION OF ARTERIOVENOUS FISTULA Left 12/10/2021    Procedure: REVISION, AV FISTULA with BVT;  Surgeon: Roberto Ryan MD;  Location: [facility] OR 2ND FLR;  Service: Peripheral Vascular;  Laterality: Left;    VITRECTOMY BY PARS PLANA APPROACH Right 2/1/2022    Procedure: VITRECTOMY, PARS PLANA APPROACH;  Surgeon: Maximilian Montaño MD;  Location: [facility] OR 1ST FLR;  Service: Ophthalmology;  Laterality: Right;    VITRECTOMY BY PARS PLANA APPROACH Right 5/31/2022    Procedure: VITRECTOMY, PARS PLANA APPROACH;  Surgeon: Maximilian Montaño MD;  Location: [facility] OR 1ST FLR;  Service: Ophthalmology;  Laterality: Right;     Family History   Problem Relation Age of Onset    Hypertension Mother     Heart disease Father     Diabetes Brother     Celiac disease Neg Hx     Cirrhosis Neg Hx     Colon cancer Neg Hx     Colon polyps Neg Hx     Crohn's disease Neg Hx     Inflammatory bowel disease Neg Hx     Liver cancer Neg Hx     Liver disease Neg Hx     Rectal cancer Neg Hx     Stomach cancer Neg Hx     Ulcerative colitis Neg Hx     Esophageal cancer Neg Hx     Hemochromatosis Neg Hx     Pancreatic cancer Neg Hx     Kidney cancer Neg Hx     Bladder Cancer Neg Hx     Uterine cancer Neg Hx     Ovarian cancer Neg Hx      Social History     Tobacco Use    Smoking status: Never Smoker    Smokeless tobacco: Never Used   Substance Use Topics    Alcohol use: No    Drug use: No     Review of Systems   Constitutional: Negative for fever.   HENT: Negative for sore throat.    Eyes: Negative for visual disturbance.   Respiratory: Positive for shortness of breath. Negative for cough.    Cardiovascular: Negative for chest pain.   Gastrointestinal: Negative for abdominal pain, diarrhea, nausea and vomiting.   Genitourinary: Negative for dysuria.   Musculoskeletal: Negative for neck pain.   Skin: Negative for rash and wound.   Allergic/Immunologic: Negative  for immunocompromised state.   Neurological: Negative for syncope.   Psychiatric/Behavioral: Negative for confusion.       Physical Exam     Initial Vitals [08/01/22 0933]   BP Pulse Resp Temp SpO2   (!) 198/101 (!) 111 20 98.7 °F (37.1 °C) 96 %      MAP       --         Physical Exam    Nursing note and vitals reviewed.  Constitutional: She appears well-developed and well-nourished. She is not diaphoretic. No distress.   HENT:   Head: Normocephalic and atraumatic.   Nose: Nose normal.   Eyes: Conjunctivae are normal. Right eye exhibits no discharge. Left eye exhibits no discharge.   Neck: Neck supple.   Normal range of motion.  Cardiovascular: Normal rate, regular rhythm and normal heart sounds. Exam reveals no gallop and no friction rub.    No murmur heard.  Pulmonary/Chest: No respiratory distress. She has no wheezes. She has no rhonchi. She has rales.   Bilateral lower lobe rales   Abdominal: Abdomen is soft. She exhibits no distension. There is no abdominal tenderness. There is no rebound and no guarding.   Musculoskeletal:         General: No tenderness or edema. Normal range of motion.      Cervical back: Normal range of motion and neck supple.     Neurological: She is alert and oriented to person, place, and time. GCS score is 15. GCS eye subscore is 4. GCS verbal subscore is 5. GCS motor subscore is 6.   Skin: Skin is warm and dry. No rash noted. No erythema.   Psychiatric: She has a normal mood and affect. Her behavior is normal. Judgment and thought content normal.         ED Course   Procedures  Labs Reviewed   COMPREHENSIVE METABOLIC PANEL - Abnormal; Notable for the following components:       Result Value    Sodium 135 (*)     Chloride 94 (*)     CO2 22 (*)     Glucose 370 (*)     BUN 68 (*)     Creatinine 8.4 (*)     Calcium 8.6 (*)     Anion Gap 19 (*)     eGFR 6.2 (*)     All other components within normal limits   CBC W/ AUTO DIFFERENTIAL - Abnormal; Notable for the following components:    RBC  3.66 (*)     Hemoglobin 11.3 (*)     Hematocrit 33.2 (*)     RDW 15.9 (*)     Gran # (ANC) 9.8 (*)     Immature Grans (Abs) 0.05 (*)     Gran % 79.0 (*)     Lymph % 13.4 (*)     All other components within normal limits   MAGNESIUM          Imaging Results    None          Medications   ondansetron disintegrating tablet 4 mg (4 mg Oral Given 8/1/22 1247)     Medical Decision Making:   History:   Old Medical Records: I decided to obtain old medical records.  Clinical Tests:   Lab Tests: Ordered and Reviewed  ED Management:  27-year-old female presents with shortness of breath.  Vitals with hypertension and tachycardia.  Physical exam as above.  Labs consistent with end-stage renal disease.  No hyperkalemia or hypoxia.  We had extensive conversation at bedside.  There is no indication for emergent hemodialysis at this time.  Will discharge home.  She has regular dialysis tomorrow morning.  Patient will return to ED for worsening symptoms, inability to eat/drink, fever greater than 100.4, or any other concerns.  Did bedside teaching with return precautions.  All questions answered.  The patient acknowledges understanding.  Gave verbal discharge instructions.                      Clinical Impression:   Final diagnoses:  [J81.0] Acute pulmonary edema (Primary)  [N18.6, Z99.2] ESRD (end stage renal disease) on dialysis          ED Disposition Condition    Discharge Stable        ED Prescriptions     None        Follow-up Information     Follow up With Specialties Details Why Contact Info    Go to dialysis tomorrow as scheduled        [facility] - Emergency Dept Emergency Medicine  Return to ED for worsening symptoms, inability to eat/drink, fever greater than 100.4, or any other concerns. [address]  [facility] [address] [phone]        Level of Complexity:  High, level 5.     Sarmad Duenas MD  08/01/22 9346

## 2022-08-01 NOTE — DISCHARGE INSTRUCTIONS
Follow diabetic diet.    Our goal in the emergency department is to always give you outstanding care and exceptional service. You may receive a survey by mail or e-mail in the next week regarding your experience in our ED. We would greatly appreciate your completing and returning the survey. Your feedback provides us with a way to recognize our staff who give very good care and it helps us learn how to improve when your experience was below our aspiration of excellence.

## 2022-08-02 ENCOUNTER — TELEPHONE (OUTPATIENT)
Dept: PULMONOLOGY | Facility: CLINIC | Age: 27
End: 2022-08-02
Payer: MEDICARE

## 2022-08-02 LAB — POCT GLUCOSE: 268 MG/DL (ref 70–110)

## 2022-08-02 NOTE — TELEPHONE ENCOUNTER
CT chest personally reviewed. There is improvement in GGOs bilaterally. Her prior CTA during admit likely was reflective of volume overload in setting of flash pulm edema. From pulmonary perspective, no objection with proceeding forward with renal transplant.     Relayed info to patient.     Tony Plascencia, DO

## 2022-08-05 ENCOUNTER — OFFICE VISIT (OUTPATIENT)
Dept: TRANSPLANT | Facility: CLINIC | Age: 27
End: 2022-08-05
Attending: NURSE PRACTITIONER
Payer: MEDICARE

## 2022-08-05 ENCOUNTER — HOSPITAL ENCOUNTER (OUTPATIENT)
Dept: CARDIOLOGY | Facility: HOSPITAL | Age: 27
Discharge: HOME | End: 2022-08-05
Attending: NURSE PRACTITIONER
Payer: MEDICARE

## 2022-08-05 VITALS — BODY MASS INDEX: 22.5 KG/M2 | HEIGHT: 66 IN | WEIGHT: 140 LBS | HEART RATE: 75 BPM

## 2022-08-05 VITALS
RESPIRATION RATE: 16 BRPM | DIASTOLIC BLOOD PRESSURE: 98 MMHG | SYSTOLIC BLOOD PRESSURE: 143 MMHG | HEIGHT: 64 IN | WEIGHT: 139.56 LBS | BODY MASS INDEX: 23.82 KG/M2 | HEART RATE: 99 BPM | TEMPERATURE: 98 F | OXYGEN SATURATION: 100 %

## 2022-08-05 DIAGNOSIS — Z76.82 PATIENT ON WAITING LIST FOR KIDNEY TRANSPLANT: Chronic | ICD-10-CM

## 2022-08-05 DIAGNOSIS — Z99.2 END STAGE RENAL FAILURE ON DIALYSIS: Primary | ICD-10-CM

## 2022-08-05 DIAGNOSIS — N18.6 END STAGE RENAL FAILURE ON DIALYSIS: Primary | ICD-10-CM

## 2022-08-05 DIAGNOSIS — Z99.2 ANEMIA DUE TO CHRONIC KIDNEY DISEASE, ON CHRONIC DIALYSIS: ICD-10-CM

## 2022-08-05 DIAGNOSIS — N18.6 TYPE 1 DIABETES MELLITUS WITH END-STAGE RENAL DISEASE (ESRD): Chronic | ICD-10-CM

## 2022-08-05 DIAGNOSIS — N18.6 ANEMIA DUE TO CHRONIC KIDNEY DISEASE, ON CHRONIC DIALYSIS: ICD-10-CM

## 2022-08-05 DIAGNOSIS — I15.0 RENOVASCULAR HYPERTENSION: ICD-10-CM

## 2022-08-05 DIAGNOSIS — Z76.82 ORGAN TRANSPLANT CANDIDATE: ICD-10-CM

## 2022-08-05 DIAGNOSIS — N25.81 SECONDARY HYPERPARATHYROIDISM: ICD-10-CM

## 2022-08-05 DIAGNOSIS — D63.1 ANEMIA DUE TO CHRONIC KIDNEY DISEASE, ON CHRONIC DIALYSIS: ICD-10-CM

## 2022-08-05 DIAGNOSIS — E10.22 TYPE 1 DIABETES MELLITUS WITH END-STAGE RENAL DISEASE (ESRD): Chronic | ICD-10-CM

## 2022-08-05 LAB
ASCENDING AORTA: 2.68 CM
AV INDEX (PROSTH): 0.8
AV MEAN GRADIENT: 6 MMHG
AV PEAK GRADIENT: 12 MMHG
AV VALVE AREA: 1.98 CM2
AV VELOCITY RATIO: 0.8
BSA FOR ECHO PROCEDURE: 1.72 M2
CV ECHO LV RWT: 0.44 CM
DOP CALC AO PEAK VEL: 1.74 M/S
DOP CALC AO VTI: 30.18 CM
DOP CALC LVOT AREA: 2.5 CM2
DOP CALC LVOT DIAMETER: 1.77 CM
DOP CALC LVOT PEAK VEL: 1.39 M/S
DOP CALC LVOT STROKE VOLUME: 59.74 CM3
DOP CALCLVOT PEAK VEL VTI: 24.29 CM
E WAVE DECELERATION TIME: 89.03 MSEC
E/A RATIO: 0.82
E/E' RATIO: 11.2 M/S
ECHO LV POSTERIOR WALL: 1.03 CM (ref 0.6–1.1)
EJECTION FRACTION: 55 %
FRACTIONAL SHORTENING: 40 % (ref 28–44)
INTERVENTRICULAR SEPTUM: 0.92 CM (ref 0.6–1.1)
IVRT: 85.63 MSEC
LA MAJOR: 5.35 CM
LA MINOR: 5.02 CM
LA WIDTH: 3.95 CM
LEFT ATRIUM SIZE: 3.7 CM
LEFT ATRIUM VOLUME INDEX MOD: 30.5 ML/M2
LEFT ATRIUM VOLUME INDEX: 37.4 ML/M2
LEFT ATRIUM VOLUME MOD: 52.39 CM3
LEFT ATRIUM VOLUME: 64.35 CM3
LEFT INTERNAL DIMENSION IN SYSTOLE: 2.79 CM (ref 2.1–4)
LEFT VENTRICLE DIASTOLIC VOLUME INDEX: 57.85 ML/M2
LEFT VENTRICLE DIASTOLIC VOLUME: 99.51 ML
LEFT VENTRICLE MASS INDEX: 90 G/M2
LEFT VENTRICLE SYSTOLIC VOLUME INDEX: 17 ML/M2
LEFT VENTRICLE SYSTOLIC VOLUME: 29.2 ML
LEFT VENTRICULAR INTERNAL DIMENSION IN DIASTOLE: 4.64 CM (ref 3.5–6)
LEFT VENTRICULAR MASS: 155.6 G
LV LATERAL E/E' RATIO: 10.5 M/S
LV SEPTAL E/E' RATIO: 12 M/S
MV A" WAVE DURATION": 13.7 MSEC
MV PEAK A VEL: 1.03 M/S
MV PEAK E VEL: 0.84 M/S
MV STENOSIS PRESSURE HALF TIME: 25.82 MS
MV VALVE AREA P 1/2 METHOD: 8.52 CM2
PULM VEIN S/D RATIO: 1.8
PV PEAK D VEL: 0.55 M/S
PV PEAK S VEL: 0.99 M/S
RA MAJOR: 4.48 CM
RA PRESSURE: 3 MMHG
RA WIDTH: 3.85 CM
RIGHT VENTRICULAR END-DIASTOLIC DIMENSION: 3.2 CM
RV TISSUE DOPPLER FREE WALL SYSTOLIC VELOCITY 1 (APICAL 4 CHAMBER VIEW): 12.2 CM/S
SINUS: 2.35 CM
STJ: 2 CM
TDI LATERAL: 0.08 M/S
TDI SEPTAL: 0.07 M/S
TDI: 0.08 M/S
TRICUSPID ANNULAR PLANE SYSTOLIC EXCURSION: 1.96 CM

## 2022-08-05 PROCEDURE — 93306 TTE W/DOPPLER COMPLETE: CPT | Mod: TXP

## 2022-08-05 PROCEDURE — 99215 PR OFFICE/OUTPT VISIT, EST, LEVL V, 40-54 MIN: ICD-10-PCS | Mod: S$GLB,TXP,, | Performed by: NURSE PRACTITIONER

## 2022-08-05 PROCEDURE — 99215 OFFICE O/P EST HI 40 MIN: CPT | Mod: S$GLB,TXP,, | Performed by: NURSE PRACTITIONER

## 2022-08-05 PROCEDURE — 99499 RISK ADDL DX/OHS AUDIT: ICD-10-PCS | Mod: S$GLB,,, | Performed by: NURSE PRACTITIONER

## 2022-08-05 PROCEDURE — 3052F PR MOST RECENT HEMOGLOBIN A1C LEVEL 8.0 - < 9.0%: ICD-10-PCS | Mod: CPTII,S$GLB,TXP, | Performed by: NURSE PRACTITIONER

## 2022-08-05 PROCEDURE — 3077F PR MOST RECENT SYSTOLIC BLOOD PRESSURE >= 140 MM HG: ICD-10-PCS | Mod: CPTII,S$GLB,TXP, | Performed by: NURSE PRACTITIONER

## 2022-08-05 PROCEDURE — 99499 UNLISTED E&M SERVICE: CPT | Mod: S$GLB,,, | Performed by: NURSE PRACTITIONER

## 2022-08-05 PROCEDURE — 99999 PR PBB SHADOW E&M-EST. PATIENT-LVL V: CPT | Mod: PBBFAC,TXP,, | Performed by: NURSE PRACTITIONER

## 2022-08-05 PROCEDURE — 1160F RVW MEDS BY RX/DR IN RCRD: CPT | Mod: CPTII,S$GLB,TXP, | Performed by: NURSE PRACTITIONER

## 2022-08-05 PROCEDURE — 93306 TTE W/DOPPLER COMPLETE: CPT | Mod: 26,TXP,, | Performed by: INTERNAL MEDICINE

## 2022-08-05 PROCEDURE — 3052F HG A1C>EQUAL 8.0%<EQUAL 9.0%: CPT | Mod: CPTII,S$GLB,TXP, | Performed by: NURSE PRACTITIONER

## 2022-08-05 PROCEDURE — 3077F SYST BP >= 140 MM HG: CPT | Mod: CPTII,S$GLB,TXP, | Performed by: NURSE PRACTITIONER

## 2022-08-05 PROCEDURE — 99999 PR PBB SHADOW E&M-EST. PATIENT-LVL V: ICD-10-PCS | Mod: PBBFAC,TXP,, | Performed by: NURSE PRACTITIONER

## 2022-08-05 PROCEDURE — 1159F MED LIST DOCD IN RCRD: CPT | Mod: CPTII,S$GLB,TXP, | Performed by: NURSE PRACTITIONER

## 2022-08-05 PROCEDURE — 3080F PR MOST RECENT DIASTOLIC BLOOD PRESSURE >= 90 MM HG: ICD-10-PCS | Mod: CPTII,S$GLB,TXP, | Performed by: NURSE PRACTITIONER

## 2022-08-05 PROCEDURE — 1159F PR MEDICATION LIST DOCUMENTED IN MEDICAL RECORD: ICD-10-PCS | Mod: CPTII,S$GLB,TXP, | Performed by: NURSE PRACTITIONER

## 2022-08-05 PROCEDURE — 4010F PR ACE/ARB THEARPY RXD/TAKEN: ICD-10-PCS | Mod: CPTII,S$GLB,TXP, | Performed by: NURSE PRACTITIONER

## 2022-08-05 PROCEDURE — 4010F ACE/ARB THERAPY RXD/TAKEN: CPT | Mod: CPTII,S$GLB,TXP, | Performed by: NURSE PRACTITIONER

## 2022-08-05 PROCEDURE — 3066F PR DOCUMENTATION OF TREATMENT FOR NEPHROPATHY: ICD-10-PCS | Mod: CPTII,S$GLB,TXP, | Performed by: NURSE PRACTITIONER

## 2022-08-05 PROCEDURE — 3008F BODY MASS INDEX DOCD: CPT | Mod: CPTII,S$GLB,TXP, | Performed by: NURSE PRACTITIONER

## 2022-08-05 PROCEDURE — 3080F DIAST BP >= 90 MM HG: CPT | Mod: CPTII,S$GLB,TXP, | Performed by: NURSE PRACTITIONER

## 2022-08-05 PROCEDURE — 3066F NEPHROPATHY DOC TX: CPT | Mod: CPTII,S$GLB,TXP, | Performed by: NURSE PRACTITIONER

## 2022-08-05 PROCEDURE — 3008F PR BODY MASS INDEX (BMI) DOCUMENTED: ICD-10-PCS | Mod: CPTII,S$GLB,TXP, | Performed by: NURSE PRACTITIONER

## 2022-08-05 PROCEDURE — 93306 ECHO (CUPID ONLY): ICD-10-PCS | Mod: 26,TXP,, | Performed by: INTERNAL MEDICINE

## 2022-08-05 PROCEDURE — 1160F PR REVIEW ALL MEDS BY PRESCRIBER/CLIN PHARMACIST DOCUMENTED: ICD-10-PCS | Mod: CPTII,S$GLB,TXP, | Performed by: NURSE PRACTITIONER

## 2022-08-05 NOTE — LETTER
August 8, 2022        Tai Camilo  [address]  [facility] [address]  Phone: [phone]  Fax: [phone]             [facility] Transplant 1st Fl  [address]  [facility] [address]  Phone: [phone]   Patient: Isabela Read   MR Number: [id number]   YOB: 1995   Date of Visit: 8/5/2022       Dear Dr. Tai Camilo    Thank you for referring Isabela Read to me for evaluation. Attached you will find relevant portions of my assessment and plan of care.    If you have questions, please do not hesitate to call me. I look forward to following Isabela Read along with you.    Sincerely,    Soraida Hollis, NP    Enclosure    If you would like to receive this communication electronically, please contact [email or web address] or [phone] to request [email or web address] Link access.    [email or web address] Link is a tool which provides read-only access to select patient information with whom you have a relationship. Its easy to use and provides real time access to review your patients record including encounter summaries, notes, results, and demographic information.    If you feel you have received this communication in error or would no longer like to receive these types of communications, please e-mail [email or web address]

## 2022-08-05 NOTE — PROGRESS NOTES
Transplant Nephrology  Kidney Transplant Recipient  Reassessment     Referring Physician: Oswald Kruger  Current Nephrologist: Tai Camilo    Subjective:   CC:  Initial evaluation of kidney transplant candidacy.    HPI:  Ms. Read is a 27 y.o. year old Black or  female who has presented to be evaluated as a potential kidney transplant recipient.  She has advanced kidney disease secondary to diabetic nephropathy and HTN.  Patient is currently on hemodialysis started on 5//2021. Patient is dialyzing on TTS schedule.  Patient reports that she is tolerating dialysis well.. She has a LUE AV fistula for dialysis access.    reports BP will drop systolic in the 80-90s toward the end of dialysis and hypotension will sometimes carry over to non dialysis days.  Has been mostly holding BP meds on dialysis days and sometimes on non dialysis days.     Previous Transplant: no     Hospitalizations/ED visits: -->Presented to the ED on 8/1/22 with slight shortness of breath  On Saturday she only had half her dialysis session because of hypotension.  Clinical Impression:   Final diagnoses:  [J81.0] Acute pulmonary edema (Primary)  [N18.6, Z99.2] ESRD (end stage renal disease) on dialysis  There is no indication for emergent hemodialysis at this time.  Will discharge home.  She has regular dialysis tomorrow morning.  Patient will return to ED for worsening symptoms,        6/14/22  per committee RECS   Unable to determine transplant candidacy at this time due to   1. need for Pulmonary follow up with clearance (what's the cause of lung disease, any progression of lung disease).    Pulmonary follow up    8/2/22 12:06 PM  CT chest personally reviewed. There is improvement in GGOs bilaterally. Her prior CTA during admit likely was reflective of volume overload in setting of flash pulm edema. From pulmonary perspective, no objection with proceeding forward with renal transplant.      Relayed info to patient.       Tony Plascencia, DO           7/27/22 CTA/P WO  Marked improvement in previous alveolar and interstitial infiltrates.  There are residual ground-glass opacity still present in all pulmonary segments, however.  No air trapping.   Interval improvement in previous intrathoracic lymphadenopathy.      2. Primary Nephrologist/dialysis unit to optimize fluid removal then repeat echo 6 weeks  repeat echo      TTE 8/5/22  · The left ventricle is normal in size with concentric remodeling and normal systolic function. The estimated ejection fraction is 55%.  · Normal right ventricular size with normal right ventricular systolic function.  · Indeterminate left ventricular diastolic function.  · Mild left atrial enlargement.  · Normal central venous pressure (3 mmHg).       DM 1  wears continuous glucose monitor   On insulin   Lab Results   Component Value Date    HGBA1C 8.3 (H) 05/08/2022         Past Medical and Surgical History: Ms. Read  has a past medical history of Anemia, Chronic hypertension with exacerbation during pregnancy in second trimester, Chronic kidney disease, Depression, Diabetes mellitus, Diabetic retinopathy, Diarrhea, Encounter for blood transfusion, Gastroparesis, Glaucoma, Hepatomegaly, psychiatric care, Hyperlipidemia, Hypertension, Nephrotic syndrome, Nephrotic syndrome, Palpitations, Poor fetal growth affecting management of mother in second trimester, Restrictive lung disease, Retinal detachment, and Severe pre-eclampsia in second trimester.  She has a past surgical history that includes AV fistula placement (Left, 4/7/2021); Percutaneous transluminal angioplasty of arteriovenous fistula (N/A, 8/11/2021); Fistulogram (N/A, 8/11/2021); Revision of arteriovenous fistula (Left, 12/10/2021); Repair of retinal detachment with vitrectomy (Right, 1/25/2022); removal implant, posterior segment, intraocular (Right, 2/1/2022); Vitrectomy by pars plana approach (Right, 2/1/2022); Esophagogastroduodenoscopy  "(N/A, 3/16/2022); Colonoscopy (N/A, 3/16/2022); Vitrectomy by pars plana approach (Right, 5/31/2022); and Laser photocoagulation of retina (Right, 5/31/2022).    Past Social and Family History: Ms. Read reports that she has never smoked. She has never used smokeless tobacco. She reports that she does not drink alcohol and does not use drugs. Her family history includes Diabetes in her brother; Heart disease in her father; Hypertension in her mother.    Review of Systems   Constitutional: Negative for activity change, appetite change, chills, fatigue, fever and unexpected weight change.   HENT: Negative for congestion, facial swelling, postnasal drip, rhinorrhea, sinus pressure, sore throat and trouble swallowing.    Eyes: Positive for visual disturbance (decreased vision --right eye--retinal surgery in 5/31/22). Negative for pain and redness.   Respiratory: Negative for cough, chest tightness, shortness of breath and wheezing.    Cardiovascular: Negative.  Negative for chest pain, palpitations and leg swelling.   Gastrointestinal: Negative for abdominal pain, diarrhea (improved), nausea and vomiting.   Genitourinary: Positive for decreased urine volume (voids ~ 1-2 x/day). Negative for dysuria, flank pain and urgency.   Musculoskeletal: Negative for arthralgias, gait problem, neck pain and neck stiffness.   Skin: Negative for rash.   Allergic/Immunologic: Negative for environmental allergies, food allergies and immunocompromised state.   Neurological: Negative for dizziness, weakness, light-headedness and headaches.   Psychiatric/Behavioral: Negative for agitation and confusion. The patient is not nervous/anxious.        Objective:   Blood pressure (!) 143/98, pulse 99, temperature 97.7 °F (36.5 °C), temperature source Temporal, resp. rate 16, height 5' 4.45" (1.637 m), weight 63.3 kg (139 lb 8.8 oz), SpO2 100 %.body mass index is 23.62 kg/m².    Physical Exam  Vitals reviewed.   Constitutional:       Appearance: " Normal appearance. She is well-developed.   HENT:      Head: Normocephalic.   Eyes:      Pupils: Pupils are equal, round, and reactive to light.   Cardiovascular:      Rate and Rhythm: Normal rate and regular rhythm.      Heart sounds: Normal heart sounds.   Pulmonary:      Effort: Pulmonary effort is normal. No respiratory distress.      Breath sounds: Normal breath sounds. No wheezing, rhonchi or rales.   Abdominal:      General: Bowel sounds are normal.      Palpations: Abdomen is soft.   Musculoskeletal:         General: Normal range of motion.        Arms:       Cervical back: Normal range of motion and neck supple.      Right lower leg: No edema.      Left lower leg: No edema.   Skin:     General: Skin is warm and dry.   Neurological:      Mental Status: She is alert and oriented to person, place, and time.      Motor: No abnormal muscle tone.   Psychiatric:         Behavior: Behavior normal.         Labs:  Lab Results   Component Value Date    WBC 12.40 08/01/2022    HGB 11.3 (L) 08/01/2022    HCT 33.2 (L) 08/01/2022     (L) 08/01/2022    K 4.7 08/01/2022    CL 94 (L) 08/01/2022    CO2 22 (L) 08/01/2022    BUN 68 (H) 08/01/2022    CREATININE 8.4 (H) 08/01/2022    EGFRNONAA 9.1 (A) 05/25/2022    CALCIUM 8.6 (L) 08/01/2022    PHOS 6.3 (H) 05/19/2022    MG 2.6 08/01/2022    ALBUMIN 3.6 08/01/2022    AST 17 08/01/2022    ALT 15 08/01/2022    UTPCR 10.82 (H) 03/05/2021    .0 (H) 04/06/2021       Lab Results   Component Value Date    BILIRUBINUA Negative 05/19/2021    PROTEINUA 3+ (A) 05/19/2021    NITRITE Negative 05/19/2021    RBCUA 21 (H) 05/19/2021    WBCUA 52 (H) 05/19/2021       No results found for: HLAABCTYPE    Labs were reviewed with the patient.    Assessment:     1. End stage renal failure on dialysis    2. Patient on waiting list for kidney transplant    3. Type 1 diabetes mellitus with end-stage renal disease (ESRD)    4. Renovascular hypertension    5. Anemia due to chronic kidney  disease, on chronic dialysis    6. Secondary hyperparathyroidism    7. BMI 22.0-22.9, adult        Plan:     Cleared by pulmonology 6/14/22  Repeat TTE 8/5/22 --acceptable    BP records from dialysis--Hypotension during dialysis and pulm edema problems, recent ED visit on 8/1/22-  Get BP logs from dialysis   cardiology referral--may be beneficial to get recs for optimizing  BP mgmt to prevent;  Hypotension during dialysis  session, which  result in incomplete sessions and symptomatic volume overload as well as hypotension on  non dialysis days.      BP Readings from Last 3 Encounters:   08/05/22 (!) 143/98   08/01/22 (!) 168/94   07/25/22 (!) 96/55         Transplant Candidacy:   Based on available information, Ms. Read is UTD kidney and pancreas transplant candidate, will need cardiology referral and represent to committee.   Meets center eligibility for accepting HCV+ donor offer - yes.  Patient educated on HCV+ donors. [facility] is willing to accept HCV+ donor offer - yes   Patient is a candidate for KDPI > 85 kidney donor offer - yes.  Final determination of transplant candidacy will be made once updated  workup is  reviewed by the selection committee.    Patient advised that it is recommended that all transplant candidates, and their close contacts and household members receive Covid vaccination.    Soraida Hollis, MARILYN       UNOS Patient Status  Functional Status: 80% - Normal activity with effort: some symptoms of disease  Physical Capacity: No Limitations

## 2022-08-08 ENCOUNTER — OFFICE VISIT (OUTPATIENT)
Dept: OPHTHALMOLOGY | Facility: CLINIC | Age: 27
End: 2022-08-08
Payer: MEDICARE

## 2022-08-08 DIAGNOSIS — E10.3521 TYPE 1 DIABETES MELLITUS WITH RIGHT EYE AFFECTED BY PROLIFERATIVE RETINOPATHY AND TRACTION RETINAL DETACHMENT INVOLVING MACULA: Primary | ICD-10-CM

## 2022-08-08 DIAGNOSIS — E10.3521: ICD-10-CM

## 2022-08-08 DIAGNOSIS — E10.3531: ICD-10-CM

## 2022-08-08 DIAGNOSIS — E10.3592 TYPE 1 DIABETES MELLITUS WITH PROLIFERATIVE RETINOPATHY OF LEFT EYE WITHOUT MACULAR EDEMA: ICD-10-CM

## 2022-08-08 PROCEDURE — 1160F PR REVIEW ALL MEDS BY PRESCRIBER/CLIN PHARMACIST DOCUMENTED: ICD-10-PCS | Mod: CPTII,S$GLB,, | Performed by: OPHTHALMOLOGY

## 2022-08-08 PROCEDURE — 99024 PR POST-OP FOLLOW-UP VISIT: ICD-10-PCS | Mod: S$GLB,,, | Performed by: OPHTHALMOLOGY

## 2022-08-08 PROCEDURE — 4010F ACE/ARB THERAPY RXD/TAKEN: CPT | Mod: CPTII,S$GLB,, | Performed by: OPHTHALMOLOGY

## 2022-08-08 PROCEDURE — 4010F PR ACE/ARB THEARPY RXD/TAKEN: ICD-10-PCS | Mod: CPTII,S$GLB,, | Performed by: OPHTHALMOLOGY

## 2022-08-08 PROCEDURE — 3052F HG A1C>EQUAL 8.0%<EQUAL 9.0%: CPT | Mod: CPTII,S$GLB,, | Performed by: OPHTHALMOLOGY

## 2022-08-08 PROCEDURE — 92134 POSTERIOR SEGMENT OCT RETINA (OCULAR COHERENCE TOMOGRAPHY)-BOTH EYES: ICD-10-PCS | Mod: S$GLB,,, | Performed by: OPHTHALMOLOGY

## 2022-08-08 PROCEDURE — 99999 PR PBB SHADOW E&M-EST. PATIENT-LVL III: CPT | Mod: PBBFAC,,, | Performed by: OPHTHALMOLOGY

## 2022-08-08 PROCEDURE — 92134 CPTRZ OPH DX IMG PST SGM RTA: CPT | Mod: S$GLB,,, | Performed by: OPHTHALMOLOGY

## 2022-08-08 PROCEDURE — 1159F PR MEDICATION LIST DOCUMENTED IN MEDICAL RECORD: ICD-10-PCS | Mod: CPTII,S$GLB,, | Performed by: OPHTHALMOLOGY

## 2022-08-08 PROCEDURE — 3052F PR MOST RECENT HEMOGLOBIN A1C LEVEL 8.0 - < 9.0%: ICD-10-PCS | Mod: CPTII,S$GLB,, | Performed by: OPHTHALMOLOGY

## 2022-08-08 PROCEDURE — 3066F PR DOCUMENTATION OF TREATMENT FOR NEPHROPATHY: ICD-10-PCS | Mod: CPTII,S$GLB,, | Performed by: OPHTHALMOLOGY

## 2022-08-08 PROCEDURE — 1160F RVW MEDS BY RX/DR IN RCRD: CPT | Mod: CPTII,S$GLB,, | Performed by: OPHTHALMOLOGY

## 2022-08-08 PROCEDURE — 3066F NEPHROPATHY DOC TX: CPT | Mod: CPTII,S$GLB,, | Performed by: OPHTHALMOLOGY

## 2022-08-08 PROCEDURE — 99024 POSTOP FOLLOW-UP VISIT: CPT | Mod: S$GLB,,, | Performed by: OPHTHALMOLOGY

## 2022-08-08 PROCEDURE — 1159F MED LIST DOCD IN RCRD: CPT | Mod: CPTII,S$GLB,, | Performed by: OPHTHALMOLOGY

## 2022-08-08 PROCEDURE — 99999 PR PBB SHADOW E&M-EST. PATIENT-LVL III: ICD-10-PCS | Mod: PBBFAC,,, | Performed by: OPHTHALMOLOGY

## 2022-08-08 NOTE — PROGRESS NOTES
Subjective:       Patient ID: Isabela Read is a 27 y.o. female      Chief Complaint   Patient presents with    Post-op Evaluation    Follow-up     History of Present Illness  HPI     DLS 06/20/2022  by Dr. ARMIN Montaño MD    Pt here overdue for post op eval PPV OD      CC: PT report: when I look down w/ OD, I can see a little, but straight    ahead is very blurry. Sees objects, colors, images  No f/f/pain  OS doing well.  No symptoms OS             Hemoglobin A1C       Date                     Value               Ref Range             Status                05/08/2022               8.3 (H)             4.0 - 5.6 %           Final                      10/23/2021               10.9 (H)            4.0 - 5.6 %           Final                     09/17/2021               10.2 (H)            4.0 - 5.6 %           Final                      Gtts:     PF 4/0           POHx:   1. Type 1 DM OD w/ PDR and TRD involving macula  S/P PPV , Removal of silicone oil, injection of 14% C3F8 gas OD 02/01/2022        Last edited by Maximilian Montaño MD on 8/8/2022  2:29 PM. (History)        Imaging:    See report    Assessment/Plan:     1. 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, associated with type 1 diabetes mellitus  Aphakic with SO  Doing well  Attached  No active prolif  Taper PF 2/0 x 1 wk then 1/0  Observe for now  Eventual SOR and IOL  Visual potential unclear    2. Type 1 diabetes mellitus with proliferative retinopathy of left eye without macular edema  Doing well  S/p PRP  Observe    Diabetic Retinopathy discussed in detail, all questions answered  Stressed importance of good BS/BP/Chol Control  RTC immediately PRN any vision changes, laure blurry vision, missing vision, floaters, distortions, etc    Follow up in about 6 weeks (around 9/19/2022), or if symptoms worsen or fail to improve, for Comprehensive Examination, OCT Mac.

## 2022-08-10 ENCOUNTER — TELEPHONE (OUTPATIENT)
Dept: TRANSPLANT | Facility: CLINIC | Age: 27
End: 2022-08-10
Payer: MEDICARE

## 2022-08-23 ENCOUNTER — PATIENT MESSAGE (OUTPATIENT)
Dept: TRANSPLANT | Facility: CLINIC | Age: 27
End: 2022-08-23
Payer: MEDICARE

## 2022-08-24 ENCOUNTER — TELEPHONE (OUTPATIENT)
Dept: TRANSPLANT | Facility: CLINIC | Age: 27
End: 2022-08-24
Payer: MEDICARE

## 2022-08-24 NOTE — TELEPHONE ENCOUNTER
"----- Message from Florentin Esquivel MD sent at 8/14/2022  6:20 PM CDT -----  Hi.  I don't think it is necessary for her to see me again.  I think you can continue managing her volume status as you do for your ESRD patients.  I think she can proceed with planned surgery without additional cardiac testing.This is consistent with my previous recommendation documented on last visit note.  Thank you for contacting me.  Respectfully    JW      ----- Message -----  From: Yasmeen Levi RN  Sent: 8/11/2022   4:09 PM CDT  To: Elmira Celis MD, Tammie Broussard DO, #    Dr Esquivel, you saw this patient in 3/2022 and 5/2022 of this year per our request . Please see our transplant providers question and repeat Echo after aggressive fluid restriction/removal on 8/5/2022.      Do you need to see this patient again to make recommendations or can you do that with chart review?  She is a 26 yo who would be ready to list for k/p after your clearance/recommendations.     ( From chart 8/5/22)    "BP records from dialysis--Hypotension during dialysis and pulm edema problems, recent ED visit on 8/1/22-  Get BP logs from dialysis   cardiology referral--may be beneficial to get recs for optimizing  BP mgmt to prevent;  Hypotension during dialysis  session, which  result in incomplete sessions and symptomatic volume overload as well as hypotension on  non dialysis days. "     BP Readings from Last 3 Encounters:  08/05/22 (!) 143/98  08/01/22 (!) 168/94  07/25/22 (!) 96/55         "

## 2022-08-25 ENCOUNTER — OFFICE VISIT (OUTPATIENT)
Dept: INTERNAL MEDICINE | Facility: CLINIC | Age: 27
End: 2022-08-25
Attending: FAMILY MEDICINE
Payer: MEDICARE

## 2022-08-25 VITALS
SYSTOLIC BLOOD PRESSURE: 180 MMHG | OXYGEN SATURATION: 100 % | WEIGHT: 139.13 LBS | DIASTOLIC BLOOD PRESSURE: 104 MMHG | BODY MASS INDEX: 23.75 KG/M2 | HEART RATE: 107 BPM | HEIGHT: 64 IN

## 2022-08-25 DIAGNOSIS — I10 HYPERTENSION, ESSENTIAL: Primary | ICD-10-CM

## 2022-08-25 DIAGNOSIS — Z99.2 END STAGE RENAL FAILURE ON DIALYSIS: ICD-10-CM

## 2022-08-25 DIAGNOSIS — I12.0 HYPERTENSIVE CHRONIC KIDNEY DISEASE WITH STAGE 5 CHRONIC KIDNEY DISEASE OR END STAGE RENAL DISEASE: ICD-10-CM

## 2022-08-25 DIAGNOSIS — N63.0 BREAST LUMP: ICD-10-CM

## 2022-08-25 DIAGNOSIS — E10.22 TYPE 1 DIABETES MELLITUS WITH END-STAGE RENAL DISEASE (ESRD): Chronic | ICD-10-CM

## 2022-08-25 DIAGNOSIS — N63.15 UNSPECIFIED LUMP IN THE RIGHT BREAST, OVERLAPPING QUADRANTS: ICD-10-CM

## 2022-08-25 DIAGNOSIS — N18.6 END STAGE RENAL FAILURE ON DIALYSIS: ICD-10-CM

## 2022-08-25 DIAGNOSIS — N18.6 TYPE 1 DIABETES MELLITUS WITH END-STAGE RENAL DISEASE (ESRD): Chronic | ICD-10-CM

## 2022-08-25 PROBLEM — M25.69 DECREASED RANGE OF MOTION OF TRUNK AND BACK: Status: RESOLVED | Noted: 2021-02-04 | Resolved: 2022-08-25

## 2022-08-25 PROBLEM — R29.898 DECREASED STRENGTH OF LOWER EXTREMITY: Status: RESOLVED | Noted: 2021-02-04 | Resolved: 2022-08-25

## 2022-08-25 PROCEDURE — 3080F DIAST BP >= 90 MM HG: CPT | Mod: CPTII,S$GLB,, | Performed by: FAMILY MEDICINE

## 2022-08-25 PROCEDURE — 3080F PR MOST RECENT DIASTOLIC BLOOD PRESSURE >= 90 MM HG: ICD-10-PCS | Mod: CPTII,S$GLB,, | Performed by: FAMILY MEDICINE

## 2022-08-25 PROCEDURE — 99999 PR PBB SHADOW E&M-EST. PATIENT-LVL V: CPT | Mod: PBBFAC,,, | Performed by: FAMILY MEDICINE

## 2022-08-25 PROCEDURE — 99999 PR PBB SHADOW E&M-EST. PATIENT-LVL V: ICD-10-PCS | Mod: PBBFAC,,, | Performed by: FAMILY MEDICINE

## 2022-08-25 PROCEDURE — 99214 PR OFFICE/OUTPT VISIT, EST, LEVL IV, 30-39 MIN: ICD-10-PCS | Mod: S$GLB,,, | Performed by: FAMILY MEDICINE

## 2022-08-25 PROCEDURE — 3066F PR DOCUMENTATION OF TREATMENT FOR NEPHROPATHY: ICD-10-PCS | Mod: CPTII,S$GLB,, | Performed by: FAMILY MEDICINE

## 2022-08-25 PROCEDURE — 4010F ACE/ARB THERAPY RXD/TAKEN: CPT | Mod: CPTII,S$GLB,, | Performed by: FAMILY MEDICINE

## 2022-08-25 PROCEDURE — 3077F SYST BP >= 140 MM HG: CPT | Mod: CPTII,S$GLB,, | Performed by: FAMILY MEDICINE

## 2022-08-25 PROCEDURE — 3066F NEPHROPATHY DOC TX: CPT | Mod: CPTII,S$GLB,, | Performed by: FAMILY MEDICINE

## 2022-08-25 PROCEDURE — 3052F HG A1C>EQUAL 8.0%<EQUAL 9.0%: CPT | Mod: CPTII,S$GLB,, | Performed by: FAMILY MEDICINE

## 2022-08-25 PROCEDURE — 4010F PR ACE/ARB THEARPY RXD/TAKEN: ICD-10-PCS | Mod: CPTII,S$GLB,, | Performed by: FAMILY MEDICINE

## 2022-08-25 PROCEDURE — 3052F PR MOST RECENT HEMOGLOBIN A1C LEVEL 8.0 - < 9.0%: ICD-10-PCS | Mod: CPTII,S$GLB,, | Performed by: FAMILY MEDICINE

## 2022-08-25 PROCEDURE — 1159F MED LIST DOCD IN RCRD: CPT | Mod: CPTII,S$GLB,, | Performed by: FAMILY MEDICINE

## 2022-08-25 PROCEDURE — 99214 OFFICE O/P EST MOD 30 MIN: CPT | Mod: S$GLB,,, | Performed by: FAMILY MEDICINE

## 2022-08-25 PROCEDURE — 3077F PR MOST RECENT SYSTOLIC BLOOD PRESSURE >= 140 MM HG: ICD-10-PCS | Mod: CPTII,S$GLB,, | Performed by: FAMILY MEDICINE

## 2022-08-25 PROCEDURE — 3008F PR BODY MASS INDEX (BMI) DOCUMENTED: ICD-10-PCS | Mod: CPTII,S$GLB,, | Performed by: FAMILY MEDICINE

## 2022-08-25 PROCEDURE — 1160F RVW MEDS BY RX/DR IN RCRD: CPT | Mod: CPTII,S$GLB,, | Performed by: FAMILY MEDICINE

## 2022-08-25 PROCEDURE — 3008F BODY MASS INDEX DOCD: CPT | Mod: CPTII,S$GLB,, | Performed by: FAMILY MEDICINE

## 2022-08-25 PROCEDURE — 1160F PR REVIEW ALL MEDS BY PRESCRIBER/CLIN PHARMACIST DOCUMENTED: ICD-10-PCS | Mod: CPTII,S$GLB,, | Performed by: FAMILY MEDICINE

## 2022-08-25 PROCEDURE — 1159F PR MEDICATION LIST DOCUMENTED IN MEDICAL RECORD: ICD-10-PCS | Mod: CPTII,S$GLB,, | Performed by: FAMILY MEDICINE

## 2022-08-25 NOTE — ASSESSMENT & PLAN NOTE
Labile, advise patient take nifedipine now, monitor blood pressure.  Go to emergency room for greater than 200 systolic, 110 diastolic.  She can take carvedilol tonight later.  Send blood pressure readings to me.  Cardiology consult and follow-up with me 1 week.  Will back down medications to once a day each for the time being and titrate.  Follow-up with nephrologist at dialysis as well.

## 2022-08-25 NOTE — PROGRESS NOTES
Subjective:       Patient ID: Isabela Read is a 27 y.o. female.    Chief Complaint: Breast Mass and Hypertension (Low and high bp readings )    Established patient, same day urgent care presents - hypertension and hypotension over the past 2 weeks.  States she has hired low blood pressures.  She has not taken her medication today.  On her dialysis days her pressure goes down at times.  She states that last night she was 215/127 took medication and later 95/62.  She currently does not have a headache, chest pain or dyspnea, no peripheral edema.  When she has low readings she states she typically will have lightheadedness and orthostasis.  States her nephrologist stopped hydralazine few months ago.  She currently takes nifedipine and carvedilol twice daily each.  No recent Nephrology visits here, typically managed at her dialysis encounters.    Two week history of a small right breast lump with no redness, warmth, drainage.    Review of Systems   Constitutional: Negative for appetite change, chills, diaphoresis, fatigue and fever.   HENT: Negative for congestion, postnasal drip, rhinorrhea, sore throat and trouble swallowing.    Eyes: Positive for visual disturbance.        R eye surgery   Respiratory: Negative for cough, choking, chest tightness, shortness of breath and wheezing.    Cardiovascular: Negative for chest pain and leg swelling.   Gastrointestinal: Negative for abdominal distention, abdominal pain, diarrhea, nausea and vomiting.   Genitourinary: Negative for difficulty urinating and hematuria.   Musculoskeletal: Negative for arthralgias and myalgias.   Skin: Negative for rash.   Neurological: Negative for weakness, light-headedness and headaches.   Hematological: Does not bruise/bleed easily.   Psychiatric/Behavioral: Negative for decreased concentration and dysphoric mood.       Objective:      Physical Exam  Vitals and nursing note reviewed.   Constitutional:       Appearance: She is  well-developed. She is not diaphoretic.   HENT:      Head: Normocephalic and atraumatic.   Eyes:      General: No scleral icterus.     Conjunctiva/sclera: Conjunctivae normal.   Cardiovascular:      Rate and Rhythm: Normal rate.      Heart sounds: Normal heart sounds. No murmur heard.    No friction rub. No gallop.   Pulmonary:      Effort: Pulmonary effort is normal. No respiratory distress.      Breath sounds: Normal breath sounds. No wheezing or rales.   Abdominal:      General: There is no distension or abdominal bruit.      Tenderness: There is no abdominal tenderness.   Musculoskeletal:         General: No deformity.      Cervical back: Normal range of motion and neck supple.   Skin:     General: Skin is warm and dry.      Findings: No erythema or rash.   Neurological:      Mental Status: She is alert and oriented to person, place, and time.      Cranial Nerves: No cranial nerve deficit.      Motor: No tremor.      Coordination: Coordination normal.      Gait: Gait normal.   Psychiatric:         Behavior: Behavior normal.         Thought Content: Thought content normal.         Judgment: Judgment normal.         Assessment:       1. Hypertension, essential    2. Type 1 diabetes mellitus with end-stage renal disease (ESRD)    3. Hypertensive chronic kidney disease with stage 5 chronic kidney disease or end stage renal disease    4. End stage renal failure on dialysis    5. Breast lump    6. Unspecified lump in the right breast, overlapping quadrants         Plan:     Medication List with Changes/Refills   Current Medications    ACETAMINOPHEN (TYLENOL) 325 MG TABLET    Take 1 tablet (325 mg total) by mouth every 6 (six) hours as needed for Pain.    ASPIRIN (ECOTRIN) 81 MG EC TABLET    Take 81 mg by mouth once daily.    BLOOD SUGAR DIAGNOSTIC STRP    To check BG 4 - 6 times daily, to use with insurance preferred meter    CALCITRIOL (ROCALTROL) 0.5 MCG CAP    Take 0.5 mcg by mouth once daily.    CARVEDILOL (COREG)  "25 MG TABLET    Take 25 mg by mouth 2 (two) times daily with meals.    FLASH GLUCOSE SCANNING READER (FREESTYLE MARCY 14 DAY READER) MISC    1 each by Misc.(Non-Drug; Combo Route) route once daily.    FLASH GLUCOSE SENSOR (FREESTYLE MARCY 14 DAY SENSOR) KIT    6 kits by Misc.(Non-Drug; Combo Route) route once daily.    FLUTICASONE PROPIONATE (FLONASE) 50 MCG/ACTUATION NASAL SPRAY    1 spray by Each Nostril route daily as needed for Rhinitis or Allergies.    HYDRALAZINE (APRESOLINE) 25 MG TABLET    Take 2 tablets (50 mg total) by mouth every 8 (eight) hours.    INSULIN DETEMIR U-100 (LEVEMIR FLEXTOUCH) 100 UNIT/ML (3 ML) SUBQ INPN PEN    10 units subcutaneously in the morning and 12 units sq at night    INSULIN LISPRO (HUMALOG KWIKPEN INSULIN) 100 UNIT/ML PEN    Inject 10 units into skin the skin 3 three times daily with meals    LANCETS MISC    To check BG 4 - 6 times daily, to use with insurance preferred meter    MEDROXYPROGESTERONE (DEPO-PROVERA) 150 MG/ML SYRG    Inject 1 mL (150 mg total) into the muscle once. for 1 dose    MULTIVIT-MIN/FOLIC ACID/BIOTIN (HAIR,SKIN AND NAILS,FA-BIOTIN, ORAL)    Take 1 tablet by mouth once daily.    NIFEDIPINE (PROCARDIA-XL) 60 MG (OSM) 24 HR TABLET    Take 60 mg by mouth 2 (two) times a day.    PEN NEEDLE, DIABETIC 32 GAUGE X 5/32" NDLE    For three times daily injection    PREDNISOLONE ACETATE (PRED FORTE) 1 % DRPS    Place 1 drop into the right eye 4 (four) times daily.    ROSUVASTATIN (CRESTOR) 10 MG TABLET    TAKE 1 TABLET BY MOUTH ONCE DAILY IN THE EVENING    SEVELAMER CARBONATE (RENVELA) 800 MG TAB    TAKE 1 TABLET BY MOUTH THREE TIMES DAILY WITH MEALS    TRAZODONE (DESYREL) 50 MG TABLET    Take 1 tablet (50 mg total) by mouth nightly as needed for Insomnia.    VENLAFAXINE (EFFEXOR XR) 37.5 MG 24 HR CAPSULE    Take 1 capsule (37.5 mg total) by mouth once daily.     Crystal was seen today for breast mass and hypertension.    Diagnoses and all orders for this " visit:    Hypertension, essential  -     Ambulatory referral/consult to Cardiology; Future    Type 1 diabetes mellitus with end-stage renal disease (ESRD)    Hypertensive chronic kidney disease with stage 5 chronic kidney disease or end stage renal disease    End stage renal failure on dialysis    Breast lump  Comments:  Defer breast examination to breast Center, consult placed.  Orders:  -     Ambulatory referral/consult to Breast Surgery; Future  -     Mammo Digital Diagnostic Right with [facility]; Future  -     US Breast Right Limited; Future    Unspecified lump in the right breast, overlapping quadrants   -     Mammo Digital Diagnostic Right with [facility]; Future      See meds, orders, follow up, routing and instructions sections of encounter and AVS. Discussed with patient and provided on AVS.    Hypertension, essential  Labile, advise patient take nifedipine now, monitor blood pressure.  Go to emergency room for greater than 200 systolic, 110 diastolic.  She can take carvedilol tonight later.  Send blood pressure readings to me.  Cardiology consult and follow-up with me 1 week.  Will back down medications to once a day each for the time being and titrate.  Follow-up with nephrologist at dialysis as well.    Lab Results   Component Value Date     (L) 08/01/2022    K 4.7 08/01/2022    CL 94 (L) 08/01/2022    BUN 68 (H) 08/01/2022    CREATININE 8.4 (H) 08/01/2022     (H) 08/01/2022    HGBA1C 8.3 (H) 05/08/2022    MG 2.6 08/01/2022    AST 17 08/01/2022    ALT 15 08/01/2022    ALBUMIN 3.6 08/01/2022    PROT 8.3 08/01/2022    BILITOT 0.6 08/01/2022    CHOL 135 10/11/2021    HDL 71 10/11/2021    LDLCALC 56.8 (L) 10/11/2021    TRIG 36 10/11/2021    WBC 12.40 08/01/2022    HGB 11.3 (L) 08/01/2022    HCT 33.2 (L) 08/01/2022    HCT 33 (L) 05/17/2022     08/01/2022    TSH 3.786 08/04/2020     (H) 05/25/2022         Today's appointment was >25 minutes including chart review (such as review of consult  notes, op notes, ER notes, labs, imaging, path, cultures, etc.), medication reconciliation and adjustment, and in some cases >50% time spent in counseling.

## 2022-08-26 ENCOUNTER — COMMITTEE REVIEW (OUTPATIENT)
Dept: TRANSPLANT | Facility: CLINIC | Age: 27
End: 2022-08-26
Payer: MEDICARE

## 2022-08-26 NOTE — LETTER
August 26, 2022    Tai Camilo  [address] [address]     Dear Dr. Camilo:    Patient: Isabela Read   MR Number: [id number]   YOB: 1995     Your patient, Isabela Read, was recently discussed at the [facility] Selection Committee meeting on 8/26/2022. I am happy to inform you that Isabela has been approved for transplantation.  She has met selection criteria for a kidney and pancreas transplant related to ESRD secondary to primary diagnosis of Diabetes Mellitus - Type I. Your patient will be placed on the cadaveric wait list pending final financial approval from insurance company.     We appreciate your confidence in allowing us to participate in your patients care.  If you have any questions or concerns, please do not hesitate to contact me.    Sincerely,      Elmira Bronson MD  Medical Director, Kidney & Kidney/Pancreas Transplantation    Tj/Enclosed    Cc:[facility] KIDNEY [facility]

## 2022-08-26 NOTE — COMMITTEE REVIEW
Native Organ Dx: Diabetes Mellitus - Type I      SELECTION COMMITTEE NOTE    Isabela Read was re presented at selection committee on 8/26/2022.  Patient met selection criteria for kidney/pancreas transplant related to ESRD due to   Diabetes Mellitus - Type I.  No absolute contraindications to transplant at this time.  Patient will be placed on the cadaveric wait list pending final financial approval from insurance company.  Patient will return to clinic for routine appointment in 6 month(s). Patient does not meet criteria for High KDPI kidney offer. Patient meets HCV+ kidney offer. Patient does not meet criteria for dual/enbloc, due to guidelines.    Note written by Yasmeen Levi RN    ===============================================    I was present at the meeting and attest to the decision of the committee.

## 2022-08-30 ENCOUNTER — TELEPHONE (OUTPATIENT)
Dept: TRANSPLANT | Facility: CLINIC | Age: 27
End: 2022-08-30
Payer: MEDICARE

## 2022-09-06 ENCOUNTER — EPISODE CHANGES (OUTPATIENT)
Dept: TRANSPLANT | Facility: CLINIC | Age: 27
End: 2022-09-06

## 2022-09-07 ENCOUNTER — OFFICE VISIT (OUTPATIENT)
Dept: SURGERY | Facility: CLINIC | Age: 27
End: 2022-09-07
Attending: FAMILY MEDICINE
Payer: MEDICARE

## 2022-09-07 ENCOUNTER — HOSPITAL ENCOUNTER (OUTPATIENT)
Dept: RADIOLOGY | Facility: HOSPITAL | Age: 27
Discharge: HOME | End: 2022-09-07
Attending: FAMILY MEDICINE
Payer: MEDICARE

## 2022-09-07 VITALS
HEIGHT: 64 IN | BODY MASS INDEX: 24.84 KG/M2 | WEIGHT: 145.5 LBS | HEART RATE: 98 BPM | SYSTOLIC BLOOD PRESSURE: 194 MMHG | DIASTOLIC BLOOD PRESSURE: 96 MMHG

## 2022-09-07 DIAGNOSIS — N63.0 BREAST LUMP: ICD-10-CM

## 2022-09-07 DIAGNOSIS — N63.15 UNSPECIFIED LUMP IN THE RIGHT BREAST, OVERLAPPING QUADRANTS: ICD-10-CM

## 2022-09-07 DIAGNOSIS — Z12.39 SCREENING BREAST EXAMINATION: ICD-10-CM

## 2022-09-07 DIAGNOSIS — N63.0 BREAST LUMP: Primary | ICD-10-CM

## 2022-09-07 PROCEDURE — 3077F PR MOST RECENT SYSTOLIC BLOOD PRESSURE >= 140 MM HG: ICD-10-PCS | Mod: CPTII,NTX,S$GLB, | Performed by: PHYSICIAN ASSISTANT

## 2022-09-07 PROCEDURE — 1159F MED LIST DOCD IN RCRD: CPT | Mod: CPTII,NTX,S$GLB, | Performed by: PHYSICIAN ASSISTANT

## 2022-09-07 PROCEDURE — 99999 PR PBB SHADOW E&M-EST. PATIENT-LVL IV: CPT | Mod: PBBFAC,TXP,, | Performed by: PHYSICIAN ASSISTANT

## 2022-09-07 PROCEDURE — 3046F PR MOST RECENT HEMOGLOBIN A1C LEVEL > 9.0%: ICD-10-PCS | Mod: CPTII,NTX,S$GLB, | Performed by: PHYSICIAN ASSISTANT

## 2022-09-07 PROCEDURE — 1160F PR REVIEW ALL MEDS BY PRESCRIBER/CLIN PHARMACIST DOCUMENTED: ICD-10-PCS | Mod: CPTII,NTX,S$GLB, | Performed by: PHYSICIAN ASSISTANT

## 2022-09-07 PROCEDURE — 99999 PR PBB SHADOW E&M-EST. PATIENT-LVL IV: ICD-10-PCS | Mod: PBBFAC,TXP,, | Performed by: PHYSICIAN ASSISTANT

## 2022-09-07 PROCEDURE — 77066 DX MAMMO INCL CAD BI: CPT | Mod: 26,NTX,, | Performed by: RADIOLOGY

## 2022-09-07 PROCEDURE — 3008F BODY MASS INDEX DOCD: CPT | Mod: CPTII,NTX,S$GLB, | Performed by: PHYSICIAN ASSISTANT

## 2022-09-07 PROCEDURE — 3066F PR DOCUMENTATION OF TREATMENT FOR NEPHROPATHY: ICD-10-PCS | Mod: CPTII,NTX,S$GLB, | Performed by: PHYSICIAN ASSISTANT

## 2022-09-07 PROCEDURE — 76642 US BREAST BILATERAL LIMITED: ICD-10-PCS | Mod: 26,50,NTX, | Performed by: RADIOLOGY

## 2022-09-07 PROCEDURE — 77066 MAMMO DIGITAL DIAGNOSTIC BILAT WITH TOMO: ICD-10-PCS | Mod: 26,NTX,, | Performed by: RADIOLOGY

## 2022-09-07 PROCEDURE — 4010F PR ACE/ARB THEARPY RXD/TAKEN: ICD-10-PCS | Mod: CPTII,NTX,S$GLB, | Performed by: PHYSICIAN ASSISTANT

## 2022-09-07 PROCEDURE — 4010F ACE/ARB THERAPY RXD/TAKEN: CPT | Mod: CPTII,NTX,S$GLB, | Performed by: PHYSICIAN ASSISTANT

## 2022-09-07 PROCEDURE — 76642 ULTRASOUND BREAST LIMITED: CPT | Mod: 26,50,NTX, | Performed by: RADIOLOGY

## 2022-09-07 PROCEDURE — 1160F RVW MEDS BY RX/DR IN RCRD: CPT | Mod: CPTII,NTX,S$GLB, | Performed by: PHYSICIAN ASSISTANT

## 2022-09-07 PROCEDURE — 3080F DIAST BP >= 90 MM HG: CPT | Mod: CPTII,NTX,S$GLB, | Performed by: PHYSICIAN ASSISTANT

## 2022-09-07 PROCEDURE — 3080F PR MOST RECENT DIASTOLIC BLOOD PRESSURE >= 90 MM HG: ICD-10-PCS | Mod: CPTII,NTX,S$GLB, | Performed by: PHYSICIAN ASSISTANT

## 2022-09-07 PROCEDURE — 77062 MAMMO DIGITAL DIAGNOSTIC BILAT WITH TOMO: ICD-10-PCS | Mod: 26,NTX,, | Performed by: RADIOLOGY

## 2022-09-07 PROCEDURE — 3046F HEMOGLOBIN A1C LEVEL >9.0%: CPT | Mod: CPTII,NTX,S$GLB, | Performed by: PHYSICIAN ASSISTANT

## 2022-09-07 PROCEDURE — 1159F PR MEDICATION LIST DOCUMENTED IN MEDICAL RECORD: ICD-10-PCS | Mod: CPTII,NTX,S$GLB, | Performed by: PHYSICIAN ASSISTANT

## 2022-09-07 PROCEDURE — 3008F PR BODY MASS INDEX (BMI) DOCUMENTED: ICD-10-PCS | Mod: CPTII,NTX,S$GLB, | Performed by: PHYSICIAN ASSISTANT

## 2022-09-07 PROCEDURE — 76642 ULTRASOUND BREAST LIMITED: CPT | Mod: TC,50,TXP

## 2022-09-07 PROCEDURE — 99214 OFFICE O/P EST MOD 30 MIN: CPT | Mod: NTX,S$GLB,, | Performed by: PHYSICIAN ASSISTANT

## 2022-09-07 PROCEDURE — 3077F SYST BP >= 140 MM HG: CPT | Mod: CPTII,NTX,S$GLB, | Performed by: PHYSICIAN ASSISTANT

## 2022-09-07 PROCEDURE — 77066 DX MAMMO INCL CAD BI: CPT | Mod: TC,TXP

## 2022-09-07 PROCEDURE — 99214 PR OFFICE/OUTPT VISIT, EST, LEVL IV, 30-39 MIN: ICD-10-PCS | Mod: NTX,S$GLB,, | Performed by: PHYSICIAN ASSISTANT

## 2022-09-07 PROCEDURE — 3066F NEPHROPATHY DOC TX: CPT | Mod: CPTII,NTX,S$GLB, | Performed by: PHYSICIAN ASSISTANT

## 2022-09-07 PROCEDURE — 77062 BREAST TOMOSYNTHESIS BI: CPT | Mod: 26,NTX,, | Performed by: RADIOLOGY

## 2022-09-07 NOTE — PROGRESS NOTES
[facility]  Department of Surgery      REFERRING PROVIDER: Tavo Bhatia Md  [address] [place] [address]   CHIEF COMPLAINT: Bilateral breast mass      Subjective:      Isabela Read is a 27 y.o. premenopausal female referred for evaluation of a bilateral breast mass. Change was noted ~ 3 weeks ago.  Patient does routinely do self breast exams.  Patient has noted a change on breast exam.  Patient denies nipple discharge. Patient denies to previous breast biopsy. Patient denies a personal history of breast cancer.    GYN History:  Age of menarche was 12.   Patient is .     FAMILY history:  Denies significant family history of breast or other cancer.     Past Medical History:   Diagnosis Date    Acute kidney injury superimposed on chronic kidney disease 2021    Anemia     Chronic hypertension with exacerbation during pregnancy in second trimester 2020    Current regimen (20):  - Carvedilol 12.5 mg BID - Nifedipine 30 mg daily Baseline CKD + proteinuria    Chronic kidney disease     Depression     Diabetes mellitus     Diabetic retinopathy     Diarrhea     Encounter for blood transfusion     Gastroparesis     Glaucoma     Hepatomegaly 2021    Hx of psychiatric care     Hyperlipidemia     Hypertension     Nephrotic syndrome     Nephrotic syndrome 3/4/2021    Palpitations     Poor fetal growth affecting management of mother in second trimester 10/15/2020    Restrictive lung disease     Retinal detachment     Severe pre-eclampsia in second trimester 2020     Past Surgical History:   Procedure Laterality Date    AV FISTULA PLACEMENT Left 2021    Procedure: CREATION, AV FISTULA;  Surgeon: Roberto Ryan MD;  Location: [facility] OR [address] [place] [address];  Service: Peripheral Vascular;  Laterality: Left;    COLONOSCOPY N/A 3/16/2022    Procedure: COLONOSCOPY;  Surgeon: Tavo Kwok MD;  [facility] ([facility]);  Service: Endoscopy;  Laterality: N/A;   Questionable history of delayed gastric emptying longstanding diabetes now on eating pre transplant workup for history of nausea vomiting which seems to have improved with dialysis also chronic diarrhea and history of anemia pre transplant workup for kidney transplant. 3    ESOPHAGOGASTRODUODENOSCOPY N/A 3/16/2022    Procedure: EGD (ESOPHAGOGASTRODUODENOSCOPY);  Surgeon: Tavo Kwok MD;  [facility]  Service: Endoscopy;  Laterality: N/A;  Questionable history of delayed gastric emptying longstanding diabetes now on eating pre transplant workup for history of nausea vomiting which seems to have improved with dialysis also chronic diarrhea and history of anemia pre transplant workup for    FISTULOGRAM N/A 8/11/2021    Procedure: Fistulogram;  Surgeon: Robreto Ryan MD;  Location: [facility] CATH LAB;  Service: Cardiology;  Laterality: N/A;    LASER PHOTOCOAGULATION OF RETINA Right 5/31/2022    Procedure: PHOTOCOAGULATION, RETINA, USING LASER;  Surgeon: Maximilian Montaño MD;  Location: [address];  Service: Ophthalmology;  Laterality: Right;    PERCUTANEOUS TRANSLUMINAL ANGIOPLASTY OF ARTERIOVENOUS FISTULA N/A 8/11/2021    Procedure: PTA, AV FISTULA;  Surgeon: Roberto Ryan MD;  Location: [facility] CATH LAB;  Service: Cardiology;  Laterality: N/A;    REMOVAL IMPLANT, POSTERIOR SEGMENT, INTRAOCULAR Right 2/1/2022    Procedure: REMOVAL IMPLANT, POSTERIOR SEGMENT, INTRAOCULAR;  Surgeon: Maximilian Montaño MD;  Location: [address];  Service: Ophthalmology;  Laterality: Right;    REPAIR OF RETINAL DETACHMENT WITH VITRECTOMY Right 1/25/2022    Procedure: REPAIR, RETINAL DETACHMENT, WITH VITRECTOMY, MEMBRANE PEEL, LASER, INJECTION OF GAS VS OIL;  Surgeon: Maximilian Montaño MD;  Location: [address];  Service: Ophthalmology;  Laterality: Right;    REVISION OF ARTERIOVENOUS FISTULA Left 12/10/2021    Procedure: REVISION, AV FISTULA with BVT;  Surgeon: Roberto Ryan,  MD;  [facility];  Service: Peripheral Vascular;  Laterality: Left;    VITRECTOMY BY PARS PLANA APPROACH Right 2/1/2022    Procedure: VITRECTOMY, PARS PLANA APPROACH;  Surgeon: Maximilian Montaño MD;  Location: [facility] OR [address] [place] [address];  Service: Ophthalmology;  Laterality: Right;    VITRECTOMY BY PARS PLANA APPROACH Right 5/31/2022    Procedure: VITRECTOMY, PARS PLANA APPROACH;  Surgeon: Maximilian Montaño MD;  Location: [facility] OR [address] [place] [address];  Service: Ophthalmology;  Laterality: Right;     Current Outpatient Medications on File Prior to Visit   Medication Sig Dispense Refill    acetaminophen (TYLENOL) 325 MG tablet Take 1 tablet (325 mg total) by mouth every 6 (six) hours as needed for Pain.  0    aspirin (ECOTRIN) 81 MG EC tablet Take 81 mg by mouth once daily.      blood sugar diagnostic Strp To check BG 4 - 6 times daily, to use with insurance preferred meter 150 each 11    calcitRIOL (ROCALTROL) 0.5 MCG Cap Take 0.5 mcg by mouth once daily.      carvediloL (COREG) 25 MG tablet Take 25 mg by mouth 2 (two) times daily with meals.      flash glucose scanning reader (FREESTYLE MARCY 14 DAY READER) Misc 1 each by Misc.(Non-Drug; Combo Route) route once daily. 1 each 0    flash glucose sensor (FREESTYLE MARCY 14 DAY SENSOR) Kit 6 kits by Misc.(Non-Drug; Combo Route) route once daily. 6 kit 3    fluticasone propionate (FLONASE) 50 mcg/actuation nasal spray 1 spray by Each Nostril route daily as needed for Rhinitis or Allergies.      insulin detemir U-100 (LEVEMIR FLEXTOUCH) 100 unit/mL (3 mL) SubQ InPn pen 10 units subcutaneously in the morning and 12 units sq at night 15 mL 6    insulin lispro (HUMALOG KWIKPEN INSULIN) 100 unit/mL pen Inject 10 units into skin the skin 3 three times daily with meals 15 mL 6    lancets Mis To check BG 4 - 6 times daily, to use with insurance preferred meter 150 each 11    multivit-min/folic acid/biotin (HAIR,SKIN AND NAILS,FA-BIOTIN, ORAL) Take 1 tablet by mouth once daily.       "NIFEdipine (PROCARDIA-XL) 60 MG (OSM) 24 hr tablet Take 60 mg by mouth 2 (two) times a day.      pen needle, diabetic 32 gauge x 5/32" Ndle For three times daily injection 100 each 11    prednisoLONE acetate (PRED FORTE) 1 % DrpS Place 1 drop into the right eye 4 (four) times daily. 15 mL 0    rosuvastatin (CRESTOR) 10 MG tablet TAKE 1 TABLET BY MOUTH ONCE DAILY IN THE EVENING 90 tablet 0    sevelamer carbonate (RENVELA) 800 mg Tab TAKE 1 TABLET BY MOUTH THREE TIMES DAILY WITH MEALS 90 tablet 0    venlafaxine (EFFEXOR XR) 37.5 MG 24 hr capsule Take 1 capsule (37.5 mg total) by mouth once daily. 30 capsule 11    hydrALAZINE (APRESOLINE) 25 MG tablet Take 2 tablets (50 mg total) by mouth every 8 (eight) hours. (Patient not taking: No sig reported) 90 tablet 3    medroxyPROGESTERone (DEPO-PROVERA) 150 mg/mL Syrg Inject 1 mL (150 mg total) into the muscle once. for 1 dose 1 mL 3    traZODone (DESYREL) 50 MG tablet Take 1 tablet (50 mg total) by mouth nightly as needed for Insomnia. 30 tablet 1     Current Facility-Administered Medications on File Prior to Visit   Medication Dose Route Frequency Provider Last Rate Last Admin    0.9%  NaCl infusion   Intravenous Continuous Tavo Becker MD 25 mL/hr at 12/10/21 1043 New Bag at 12/10/21 1043     Social History     Socioeconomic History    Marital status: Single   Occupational History    Occupation:  at [employer]   Tobacco Use    Smoking status: Never    Smokeless tobacco: Never   Substance and Sexual Activity    Alcohol use: No    Drug use: No    Sexual activity: Not Currently     Partners: Male     Comment: monogamous   Social History Narrative    Caregiver        Single    No kids    Had Miscarriage  At 23 weeks from preeclampsia    Disabled     Family History   Problem Relation Age of Onset    Hypertension Mother     Heart disease Father     Diabetes Brother     Celiac disease Neg Hx     Cirrhosis Neg Hx     Colon cancer Neg Hx     Colon polyps Neg Hx     Crohn's " "disease Neg Hx     Inflammatory bowel disease Neg Hx     Liver cancer Neg Hx     Liver disease Neg Hx     Rectal cancer Neg Hx     Stomach cancer Neg Hx     Ulcerative colitis Neg Hx     Esophageal cancer Neg Hx     Hemochromatosis Neg Hx     Pancreatic cancer Neg Hx     Kidney cancer Neg Hx     Bladder Cancer Neg Hx     Uterine cancer Neg Hx     Ovarian cancer Neg Hx        Review of Systems  Review of Systems   Constitutional:  Negative for chills, fever and malaise/fatigue.   HENT:  Negative for congestion.    Eyes:  Negative for discharge.   Respiratory:  Negative for cough, shortness of breath and stridor.    Cardiovascular:  Negative for chest pain and palpitations.   Gastrointestinal:  Negative for abdominal pain and nausea.   Neurological:  Negative for headaches.   Psychiatric/Behavioral:  The patient is not nervous/anxious.       Objective:        BP (!) 194/96 (BP Location: Right arm, Patient Position: Sitting, BP Method: Medium (Automatic))   Pulse 98   Ht 5' 4" (1.626 m)   Wt 66 kg (145 lb 8.1 oz)   LMP  (Exact Date)   BMI 24.98 kg/m²   Physical Exam   Vitals reviewed.  Constitutional: She is oriented to person, place, and time.   HENT:   Head: Normocephalic and atraumatic.   Nose: Nose normal.   Eyes: Pupils are equal, round, and reactive to light. Right eye exhibits no discharge. Left eye exhibits no discharge.   Pulmonary/Chest: Effort normal and breath sounds normal. No stridor. No respiratory distress. She exhibits no mass, no tenderness and no edema. Right breast exhibits no inverted nipple, no mass, no nipple discharge, no skin change and no tenderness. Left breast exhibits no inverted nipple, no mass, no nipple discharge, no skin change and no tenderness. No breast swelling or bleeding. Breasts are symmetrical.       Abdominal: Normal appearance.   Genitourinary: No breast swelling or bleeding.   Neurological: She is alert and oriented to person, place, and time.   Skin: Skin is warm and " dry.     Psychiatric: Her behavior is normal. Mood, judgment and thought content normal.      Radiology review: Images personally reviewed by me in the clinic.      9/7/22 Bilateral Diagnostic Mammo/US:    Findings:  Diagnostic Mammogram:  Computer-aided detection was utilized in the interpretation of this examination. This procedure was performed using tomosynthesis.       The breasts are extremely dense, which lowers the sensitivity of mammography. There is no evidence of suspicious masses, microcalcifications or architectural distortion.      Breast Ultrasound:  Limited Breast ultrasound was performed. Ultrasound evaluation demonstrates no suspicious abnormality.  Exam is slightly limited by poor penetration due to dense breast tissue.     Impression:  There is no mammographic or sonographic evidence of malignancy. Of note, the decision to biopsy any palpable finding should be based on clinical assessment.      BI-RADS Category 1: Negative     Recommendation:  Annual mammogram is recommended beginning at age 40.     Your estimated lifetime risk of breast cancer (to age 85) based on Tyrer-Cuzick risk assessment model is Tyrer-Cuzick: 13.18 %. According to the American Cancer Society, patients with a lifetime breast cancer risk of 20% or higher might benefit from supplemental screening tests.      Assessment:      Isabela Read is a 27 y.o. premenopausal female with complaints of palpable bilateral breast masses.      Plan:   We discussed that likely source of patient's palpable concerns of her bilateral breast is her high degree of density. No concerning findings noted on examination today. Patient reassured.     Diagnostic work up performed same day as visit BIRADS 1, extreme density noted.     We discussed supportive measures with a good support bra, anti-inflammatories (ibuprofen) as needed, warm compresses.      Advised patient, if she experiences worsening symptoms or changes to contact our office.  Could consider an MRI in the future given density.     Patient verbalized understanding of plan. All questions asked and answered. Advised to call our office with any new or worsening sxs.       Katherine Morgan PA-C  Breast Surgery        Katherine Morgan PA-C  Breast Surgery

## 2022-09-09 ENCOUNTER — DOCUMENTATION ONLY (OUTPATIENT)
Dept: TRANSPLANT | Facility: CLINIC | Age: 27
End: 2022-09-09
Payer: MEDICARE

## 2022-09-09 ENCOUNTER — TELEPHONE (OUTPATIENT)
Dept: TRANSPLANT | Facility: CLINIC | Age: 27
End: 2022-09-09
Payer: MEDICARE

## 2022-09-09 DIAGNOSIS — Z76.82 ORGAN TRANSPLANT CANDIDATE: Primary | ICD-10-CM

## 2022-09-09 NOTE — TELEPHONE ENCOUNTER
"  KIDNEY/PANCREAS WAIT LISTING NOTE    Date of Financial clearance to list (check approval of both organs): 2022    SSN/[facility]:     Organ: Kidney and Pancreas    Last Name: Jacek  First Name: Isabela     : 1995    Gender:     female   MRN#: [id number]                                   State of Permanent Residence:  [address] [place] [id number]    Ethnicity: Not  or /a   Race:      Black or     CLINICAL INFORMATION   Candidate Medical Urgency Status: Active (1)  Number of Previous Kidney Transplants: 0  Number of Previous Pancreas Transplants:0  Number of Previous Solid Organ Transplants: 0  Did you enter number of previous kidney or other solid organ transplants? yes  Is this Candidate a Prior Living Donor: no  (If yes, please generate letter to [employer] with patient's date of donation, recipient SSN, signed by Surgical Director after patient is listed in order to receive priority points).      ABO  ABO Blood Group:   O POS     ABO Confirmation: (THESE DATES MUST BE PRIOR TO THE LIST DATE AND SUPPORTED BY SEPARATE LAB REPORTS)    Internal Results    Lab Results   Component Value Date    GROUPTRH O POS 2022    GROUPTRH O POS 2021     No results found for: ABO    External Results    ABO Date 1:    ABO Date 2  Are either of these ABO results based on External Labs? no  (If Yes, STOP and go to source document in Media Tab for verification).    VITALS  Height:  5'4"  Date taken:22  Weight: 63 kg  Date taken: 22  (Use height from Transplant clinic visits only).  Did you enter height/weight? Yes    HLA    Class I:  Lab Results   Component Value Date    MBRV0ZK 1 2021    UYYZ8WU 2 2021    WMQZ4SH 53 2021    EECU1MO 82 2021    RPRQC5RM 4 2021    JJKWD7UK 6 2021    XYZIR6IG 10 2021    LDOVP8KR 4 2021       Class II:  Lab Results   Component Value Date    ZWIQZQ58SC 7 2021    MCVWXD58MY 13 " "04/06/2021    EYHBHH017BG 52 04/06/2021    PITADC6310 53 04/06/2021    PYRHO1SG 2 04/06/2021    GMSMR4BK 6 04/06/2021       Tested for HLA Antibodies: Yes, no antibodies detected     If result is "Positive" antibodies are detected     If result is "Negative or questionable" no antibodies detected    Lab Results   Component Value Date    CIPRAS Negative 04/06/2021    CIIPRAS Negative 04/06/2021       DIALYSIS INFORMATION  Is patient Pre-Dialysis: No     Report GFR being used as the criteria for placement on the kidney list. If not, leave blank  GFR < or = 20 ml/min? n/a  If Yes, Specify value  ___   ml/min     Initial date GFR became 20 or less:   Is GFR obtained from an Outside lab Result? n/a  (If YES verify with source document scanned into media)    If patient on Dialysis:    Is candidate currently on dialysis for ESRD? Yes  If Yes,  Date Chronic Dialysis Started:   5/31/2021  (verify with source document in Media Tab)   Dialysis Unit Name: [facility] [address] [address]    DIABETES INFORMATION  Primary Native Kidney Diagnosis: Diabetes Mellitus - Type I  Primary Native Pancreas Diagnosis: Diabetes Mellitus - Type I (Pancreas)   C-Peptide Value -   Lab Results   Component Value Date    CPEPTIDE 0.08 (L) 04/06/2021     Is candidate currently on Insulin: Yes. Date Insulin started - 1/1/2003, Total insulin dosage in units - 52/day, and Insulin duration of use - 7191 days    FOR NON-KIDNEY DEPARTMENT USE ONLY:  Additional Organs Registered? none    Maximum Acceptable Number of HLA Mismatches  ABDR:     6      (0-6)               AB:               (0-4)  ADR:   _____  (0-4)              BDR: _____ (0-4)  A:        _____  (0-2)              B:      _____ (0-2)          DR: ______ (0-2)    Will Recipient Accept?   Accept HBcAB Positive Organ:            Yes  Accept HBV KEE Positive Organ:        no  Accept HCV Antibody Positive Organ: yes   Accept HCV KEE Positive " Organ: yes    Dual Kidney and En Bloc Opt In : No  Dual  Local:   No  Dual Import:   No  En Bloc Local:   No  En Bloc Import: No     Accept KDPI > 85: Single: No     Local: No     Import: No  Accept KDPI > 85: Dual: No     Local: No     Import: No    Unacceptible Antigens  If yes, list     No results found for: WE9TEUO, CIABCLM, CIIAB  ### DO NOT LIST IF ANTIGEN VALUE WEAK ###    Blood Type x2 was verified by myself and Toña Carr  Blood type determination and reporting was completed according to the programs protocols and OPTN requirements. Patient has completed Hep B vaccine series    TCR Information  Citizenship - [place] Citizen  Highest education level - High School (9-12) or GED  Functional status - 80% - normal activity with effort: some symptoms of disease   Working for income - no  Previous Pancreas [facility] - Unknown  Source of payment - Public Insurance - Medicare & Choice  Any previous malignancy - No  Total serum albumin:   Lab Results   Component Value Date    ALBUMIN 3.6 08/01/2022     Exhausted vascular access - no  Exhausted peritoneal access - no  HbA1C -   Lab Results   Component Value Date    HGBA1C 8.3 (H) 05/08/2022

## 2022-09-09 NOTE — PROGRESS NOTES
Results of pending mammo and ultrasound: Will list today with negative results.       Breast Ultrasound:  Limited Breast ultrasound was performed. Ultrasound evaluation demonstrates no suspicious abnormality.  Exam is slightly limited by poor penetration due to dense breast tissue.     Impression:  There is no mammographic or sonographic evidence of malignancy. Of note, the decision to biopsy any palpable finding should be based on clinical assessment.      BI-RADS Category 1: Negative     Recommendation:  Annual mammogram is recommended beginning at age 40.     Your estimated lifetime risk of breast cancer (to age 85) based on Tyrer-Cuzick risk assessment model is Tyrer-Cuzick: 13.18 %. According to the American Cancer Society, patients with a lifetime breast cancer risk of 20% or higher might benefit from supplemental screening tests.

## 2022-09-09 NOTE — LETTER
2022  Isabela Read  [address] [address]    Dear Isabela Read:  MRN: [id number]    Congratulations! On 2022, you were placed on  the waiting list for a  donor transplant.    Your candidacy for kidney with pancreas transplant is based on the following criteria: End Stage Renal Disease 2* to  type 1 Diabetes .    Your transplant coordinator while on the waiting list is Dennis Vernon RN. They can be reached at [phone] or [phone] with any questions.      What to do now?    Ask your living donors to call and begin testing  Give your donors our phone number, [phone]  Make sure your donors have your name and date of birth when they call  You will get transplanted much faster if you have a living donor    Have your blood sent to our Transplant Lab every month  If you are on dialysis - our Transplant Lab will work with your dialysis unit to send your blood every month  If you are not on dialysis   If you live near an [facility] lab, we will schedule you to have blood drawn every month  If you do not live near an [facility] lab, you will be sent blood kits in the mail. You will need to take a kit to your local lab or doctor to have your blood drawn every month and mail to the Transplant Lab.     Call us with ANY CHANGES  Phone numbers - we must be able to reach you anytime of the day or night when a kidney is available  Address  Insurance coverage  Dialysis unit or kidney doctor  Shaun: if you have surgery, stay in the hospital, have to get blood, or have an infection    Review your Kidney/Kidney-Pancreas Transplant Guide   This will give you detailed information about what happens when  you are on the waiting list   you are called when a kidney is available    The [facility] has a transplant surgeon and physician available 365 days a year, 24 hours a day to coordinate organ acceptance, procurement, surgical placement and to  address urgent patient care issues.  You will be notified in writing of any changes to our Transplant Centers staffing plan that would impact your ability to receive a transplant.    Attached is a letter from the [employer] for Organ Sharing (UNOS). It describes the services and information offered to patients by UN[employer] and the Organ Procurement and Transplant Network. We look forward to working with you while on the waiting list.     Congratulations,    Your Transplant Partner  [facility]   [facility] [place] [address]  [phone]             The Organ Procurement and Transplantation Network   Toll-free patient services line: Your resource for organ transplant information     If you have a question regarding your own medical care, you always should call your transplant hospital first. However, for general organ transplant-related information, you can call the Organ Procurement and Transplantation Network (OPTN) toll-free patient services line at [phone].     Anyone, including potential transplant candidates, candidates, recipients, family members, friends, living donors, and donor family members, can call this number to:     Talk about organ donation, living donation, the transplant process, the donation process, and transplant policies.   Get a free patient information kit with helpful booklets, waiting list and transplant information, and a list of all transplant hospitals.   Ask questions about the OPTN website (https://optn.transplant.hrsa.gov/), the [employer] for Organ Sharings (UNOS) website (https://unos.org/), or the UNOS website for living donors and transplant recipients. (https://www.transplantliving.org/).   Learn how the OPTN can help you.   Talk about any concerns that you may have with a transplant hospital.     The nations transplant system, the OPTN, is managed under federal contract by the [employer] Organ Sharing (UNOS),  which is a non-profit charitable organization. The OPTN helps create and define organ sharing policies that make the best use of donated organs. This process continuously evaluating new advances and discoveries so policies can be adapted to best serve patients waiting for transplants. To do so, the OPTN works closely with transplant professionals, transplant patients, transplant candidates, donor families, living donors, and the public. All transplant programs and organ procurement organizations throughout the country are OPTN members and are obligated to follow the policies the OPTN creates for allocating organs.     The OPTN also is responsible for:   Providing educational material for patients, the public, and professionals.   Raising awareness of the need for donated organs and tissue.   Coordinating organ procurement, matching, and placement.   Collecting information about every organ transplant and donation that occurs in the [place].     Remember, you should contact your transplant hospital directly if you have questions or concerns about your own medical care including medical records, work-up progress, and test results.     We are not your transplant hospital, and our staff will not be able to answer questions about your case, so please keep your transplant hospitals phone number handy.   However, while you research your transplant needs and learn as much as you can about transplantation and donation, we welcome your call to our toll-free patient services line at [phone].

## 2022-09-14 ENCOUNTER — HOSPITAL ENCOUNTER (OUTPATIENT)
Facility: HOSPITAL | Age: 27
Discharge: HOME | End: 2022-09-15
Attending: EMERGENCY MEDICINE | Admitting: HOSPITALIST
Payer: MEDICARE

## 2022-09-14 DIAGNOSIS — R07.9 CHEST PAIN: ICD-10-CM

## 2022-09-14 DIAGNOSIS — E10.649 TYPE 1 DIABETES MELLITUS WITH HYPOGLYCEMIA WITHOUT COMA: Primary | ICD-10-CM

## 2022-09-14 DIAGNOSIS — J81.0 FLASH PULMONARY EDEMA: ICD-10-CM

## 2022-09-14 DIAGNOSIS — N18.6 ESRD ON HEMODIALYSIS: ICD-10-CM

## 2022-09-14 DIAGNOSIS — I10 HYPERTENSION: ICD-10-CM

## 2022-09-14 DIAGNOSIS — Z99.2 ESRD ON HEMODIALYSIS: ICD-10-CM

## 2022-09-14 DIAGNOSIS — E78.2 MIXED HYPERLIPIDEMIA: ICD-10-CM

## 2022-09-14 PROBLEM — N18.9 CHRONIC KIDNEY DISEASE-MINERAL AND BONE DISORDER: Status: ACTIVE | Noted: 2022-09-14

## 2022-09-14 PROBLEM — E83.9 CHRONIC KIDNEY DISEASE-MINERAL AND BONE DISORDER: Status: ACTIVE | Noted: 2022-09-14

## 2022-09-14 PROBLEM — R91.8 ABNORMAL CT SCAN OF LUNG: Status: ACTIVE | Noted: 2022-09-14

## 2022-09-14 PROBLEM — J98.4 RESTRICTIVE LUNG DISEASE: Chronic | Status: RESOLVED | Noted: 2020-08-04 | Resolved: 2022-09-14

## 2022-09-14 PROBLEM — R00.0 SINUS TACHYCARDIA: Status: ACTIVE | Noted: 2022-09-14

## 2022-09-14 PROBLEM — M89.9 CHRONIC KIDNEY DISEASE-MINERAL AND BONE DISORDER: Status: ACTIVE | Noted: 2022-09-14

## 2022-09-14 LAB
ALBUMIN SERPL BCP-MCNC: 3.4 G/DL (ref 3.5–5.2)
ALP SERPL-CCNC: 103 U/L (ref 55–135)
ALT SERPL W/O P-5'-P-CCNC: 13 U/L (ref 10–44)
ANION GAP SERPL CALC-SCNC: 13 MMOL/L (ref 8–16)
AST SERPL-CCNC: 30 U/L (ref 10–40)
B-HCG UR QL: NEGATIVE
BASOPHILS # BLD AUTO: 0.04 K/UL (ref 0–0.2)
BASOPHILS NFR BLD: 0.5 % (ref 0–1.9)
BILIRUB SERPL-MCNC: 0.7 MG/DL (ref 0.1–1)
BNP SERPL-MCNC: 1768 PG/ML (ref 0–99)
BUN SERPL-MCNC: 37 MG/DL (ref 6–20)
CALCIUM SERPL-MCNC: 8.5 MG/DL (ref 8.7–10.5)
CHLORIDE SERPL-SCNC: 93 MMOL/L (ref 95–110)
CO2 SERPL-SCNC: 27 MMOL/L (ref 23–29)
CREAT SERPL-MCNC: 7.5 MG/DL (ref 0.5–1.4)
CTP QC/QA: YES
D DIMER PPP IA.FEU-MCNC: 0.87 MG/L FEU
DIFFERENTIAL METHOD: ABNORMAL
EOSINOPHIL # BLD AUTO: 0.2 K/UL (ref 0–0.5)
EOSINOPHIL NFR BLD: 1.9 % (ref 0–8)
ERYTHROCYTE [DISTWIDTH] IN BLOOD BY AUTOMATED COUNT: 20 % (ref 11.5–14.5)
EST. GFR  (NO RACE VARIABLE): 7.1 ML/MIN/1.73 M^2
GLUCOSE SERPL-MCNC: 198 MG/DL (ref 70–110)
HCT VFR BLD AUTO: 33.2 % (ref 37–48.5)
HGB BLD-MCNC: 11.1 G/DL (ref 12–16)
IMM GRANULOCYTES # BLD AUTO: 0.03 K/UL (ref 0–0.04)
IMM GRANULOCYTES NFR BLD AUTO: 0.4 % (ref 0–0.5)
LYMPHOCYTES # BLD AUTO: 1.3 K/UL (ref 1–4.8)
LYMPHOCYTES NFR BLD: 15.7 % (ref 18–48)
MCH RBC QN AUTO: 32.7 PG (ref 27–31)
MCHC RBC AUTO-ENTMCNC: 33.4 G/DL (ref 32–36)
MCV RBC AUTO: 98 FL (ref 82–98)
MONOCYTES # BLD AUTO: 0.6 K/UL (ref 0.3–1)
MONOCYTES NFR BLD: 7 % (ref 4–15)
NEUTROPHILS # BLD AUTO: 6 K/UL (ref 1.8–7.7)
NEUTROPHILS NFR BLD: 74.5 % (ref 38–73)
NRBC BLD-RTO: 0 /100 WBC
PHOSPHATE SERPL-MCNC: 5.7 MG/DL (ref 2.7–4.5)
PLATELET # BLD AUTO: 293 K/UL (ref 150–450)
PMV BLD AUTO: 10.9 FL (ref 9.2–12.9)
POCT GLUCOSE: 172 MG/DL (ref 70–110)
POCT GLUCOSE: 222 MG/DL (ref 70–110)
POCT GLUCOSE: 294 MG/DL (ref 70–110)
POCT GLUCOSE: 444 MG/DL (ref 70–110)
POCT GLUCOSE: 494 MG/DL (ref 70–110)
POTASSIUM SERPL-SCNC: 5.1 MMOL/L (ref 3.5–5.1)
PROT SERPL-MCNC: 7.8 G/DL (ref 6–8.4)
RBC # BLD AUTO: 3.39 M/UL (ref 4–5.4)
SODIUM SERPL-SCNC: 133 MMOL/L (ref 136–145)
TROPONIN I SERPL DL<=0.01 NG/ML-MCNC: <0.006 NG/ML (ref 0–0.03)
WBC # BLD AUTO: 8.03 K/UL (ref 3.9–12.7)

## 2022-09-14 PROCEDURE — 99292 PR CRITICAL CARE, ADDL 30 MIN: ICD-10-PCS | Mod: ,,, | Performed by: EMERGENCY MEDICINE

## 2022-09-14 PROCEDURE — 63600175 PHARM REV CODE 636 W HCPCS: Mod: GZ,NTX | Performed by: PHYSICIAN ASSISTANT

## 2022-09-14 PROCEDURE — 99214 PR OFFICE/OUTPT VISIT, EST, LEVL IV, 30-39 MIN: ICD-10-PCS | Mod: NTX,,, | Performed by: NURSE PRACTITIONER

## 2022-09-14 PROCEDURE — 96372 THER/PROPH/DIAG INJ SC/IM: CPT | Mod: 59 | Performed by: PHYSICIAN ASSISTANT

## 2022-09-14 PROCEDURE — 25000003 PHARM REV CODE 250: Mod: NTX | Performed by: EMERGENCY MEDICINE

## 2022-09-14 PROCEDURE — 99214 OFFICE O/P EST MOD 30 MIN: CPT | Mod: NTX,,, | Performed by: NURSE PRACTITIONER

## 2022-09-14 PROCEDURE — 25000003 PHARM REV CODE 250: Mod: NTX

## 2022-09-14 PROCEDURE — 84484 ASSAY OF TROPONIN QUANT: CPT | Mod: NTX

## 2022-09-14 PROCEDURE — 82962 GLUCOSE BLOOD TEST: CPT | Mod: NTX

## 2022-09-14 PROCEDURE — G0378 HOSPITAL OBSERVATION PER HR: HCPCS | Mod: NTX

## 2022-09-14 PROCEDURE — 94761 N-INVAS EAR/PLS OXIMETRY MLT: CPT | Mod: NTX

## 2022-09-14 PROCEDURE — 96365 THER/PROPH/DIAG IV INF INIT: CPT | Mod: NTX

## 2022-09-14 PROCEDURE — 63600175 PHARM REV CODE 636 W HCPCS: Mod: NTX | Performed by: PHYSICIAN ASSISTANT

## 2022-09-14 PROCEDURE — 80053 COMPREHEN METABOLIC PANEL: CPT | Mod: NTX

## 2022-09-14 PROCEDURE — 25000003 PHARM REV CODE 250: Mod: NTX | Performed by: PHYSICIAN ASSISTANT

## 2022-09-14 PROCEDURE — 93010 ELECTROCARDIOGRAM REPORT: CPT | Mod: NTX,,, | Performed by: INTERNAL MEDICINE

## 2022-09-14 PROCEDURE — 63600175 PHARM REV CODE 636 W HCPCS: Mod: NTX

## 2022-09-14 PROCEDURE — 94660 CPAP INITIATION&MGMT: CPT | Mod: NTX

## 2022-09-14 PROCEDURE — 85379 FIBRIN DEGRADATION QUANT: CPT | Mod: NTX | Performed by: PHYSICIAN ASSISTANT

## 2022-09-14 PROCEDURE — 25500020 PHARM REV CODE 255: Mod: NTX | Performed by: HOSPITALIST

## 2022-09-14 PROCEDURE — 96366 THER/PROPH/DIAG IV INF ADDON: CPT | Mod: NTX

## 2022-09-14 PROCEDURE — 96375 TX/PRO/DX INJ NEW DRUG ADDON: CPT | Mod: NTX

## 2022-09-14 PROCEDURE — 80100014 HC HEMODIALYSIS 1:1: Mod: NTX

## 2022-09-14 PROCEDURE — 93010 EKG 12-LEAD: ICD-10-PCS | Mod: NTX,,, | Performed by: INTERNAL MEDICINE

## 2022-09-14 PROCEDURE — 27000190 HC CPAP FULL FACE MASK W/VALVE: Mod: NTX

## 2022-09-14 PROCEDURE — 96372 THER/PROPH/DIAG INJ SC/IM: CPT | Performed by: PHYSICIAN ASSISTANT

## 2022-09-14 PROCEDURE — 63600175 PHARM REV CODE 636 W HCPCS: Mod: NTX | Performed by: EMERGENCY MEDICINE

## 2022-09-14 PROCEDURE — 83880 ASSAY OF NATRIURETIC PEPTIDE: CPT | Mod: NTX

## 2022-09-14 PROCEDURE — 84100 ASSAY OF PHOSPHORUS: CPT | Mod: NTX

## 2022-09-14 PROCEDURE — 81025 URINE PREGNANCY TEST: CPT | Mod: NTX | Performed by: HOSPITALIST

## 2022-09-14 PROCEDURE — G0257 UNSCHED DIALYSIS ESRD PT HOS: HCPCS | Mod: NTX

## 2022-09-14 PROCEDURE — 96376 TX/PRO/DX INJ SAME DRUG ADON: CPT | Mod: 59,NTX

## 2022-09-14 PROCEDURE — 27000221 HC OXYGEN, UP TO 24 HOURS: Mod: NTX

## 2022-09-14 PROCEDURE — 99291 CRITICAL CARE FIRST HOUR: CPT | Mod: NTX,,, | Performed by: EMERGENCY MEDICINE

## 2022-09-14 PROCEDURE — 99220 PR INITIAL OBSERVATION CARE,LEVL III: ICD-10-PCS | Mod: NTX,,, | Performed by: PHYSICIAN ASSISTANT

## 2022-09-14 PROCEDURE — 99220 PR INITIAL OBSERVATION CARE,LEVL III: CPT | Mod: NTX,,, | Performed by: PHYSICIAN ASSISTANT

## 2022-09-14 PROCEDURE — 85025 COMPLETE CBC W/AUTO DIFF WBC: CPT | Mod: NTX

## 2022-09-14 PROCEDURE — 99284 EMERGENCY DEPT VISIT MOD MDM: CPT | Mod: 25,NTX

## 2022-09-14 PROCEDURE — 99291 PR CRITICAL CARE, E/M 30-74 MINUTES: ICD-10-PCS | Mod: NTX,,, | Performed by: EMERGENCY MEDICINE

## 2022-09-14 PROCEDURE — 99292 CRITICAL CARE ADDL 30 MIN: CPT | Mod: ,,, | Performed by: EMERGENCY MEDICINE

## 2022-09-14 PROCEDURE — 93005 ELECTROCARDIOGRAM TRACING: CPT | Mod: NTX

## 2022-09-14 PROCEDURE — 99900035 HC TECH TIME PER 15 MIN (STAT): Mod: NTX

## 2022-09-14 RX ORDER — NITROGLYCERIN 20 MG/100ML
5 INJECTION INTRAVENOUS CONTINUOUS
Status: DISCONTINUED | OUTPATIENT
Start: 2022-09-14 | End: 2022-09-14

## 2022-09-14 RX ORDER — ACETAMINOPHEN 325 MG/1
650 TABLET ORAL EVERY 4 HOURS PRN
Status: DISCONTINUED | OUTPATIENT
Start: 2022-09-14 | End: 2022-09-15 | Stop reason: HOSPADM

## 2022-09-14 RX ORDER — IPRATROPIUM BROMIDE AND ALBUTEROL SULFATE 2.5; .5 MG/3ML; MG/3ML
3 SOLUTION RESPIRATORY (INHALATION) EVERY 4 HOURS PRN
Status: DISCONTINUED | OUTPATIENT
Start: 2022-09-14 | End: 2022-09-15 | Stop reason: HOSPADM

## 2022-09-14 RX ORDER — PREDNISOLONE ACETATE 10 MG/ML
1 SUSPENSION/ DROPS OPHTHALMIC DAILY
Status: DISCONTINUED | OUTPATIENT
Start: 2022-09-15 | End: 2022-09-15 | Stop reason: HOSPADM

## 2022-09-14 RX ORDER — ATORVASTATIN CALCIUM 40 MG/1
40 TABLET, FILM COATED ORAL NIGHTLY
Refills: 0 | Status: DISCONTINUED | OUTPATIENT
Start: 2022-09-14 | End: 2022-09-15 | Stop reason: HOSPADM

## 2022-09-14 RX ORDER — PROCHLORPERAZINE EDISYLATE 5 MG/ML
5 INJECTION INTRAMUSCULAR; INTRAVENOUS EVERY 6 HOURS PRN
Status: DISCONTINUED | OUTPATIENT
Start: 2022-09-14 | End: 2022-09-15 | Stop reason: HOSPADM

## 2022-09-14 RX ORDER — NIFEDIPINE 30 MG/1
60 TABLET, EXTENDED RELEASE ORAL DAILY
Status: DISCONTINUED | OUTPATIENT
Start: 2022-09-14 | End: 2022-09-14

## 2022-09-14 RX ORDER — ONDANSETRON 2 MG/ML
INJECTION INTRAMUSCULAR; INTRAVENOUS
Status: COMPLETED
Start: 2022-09-14 | End: 2022-09-14

## 2022-09-14 RX ORDER — HYDRALAZINE HYDROCHLORIDE 20 MG/ML
10 INJECTION INTRAMUSCULAR; INTRAVENOUS ONCE
Status: COMPLETED | OUTPATIENT
Start: 2022-09-14 | End: 2022-09-14

## 2022-09-14 RX ORDER — HYDRALAZINE HYDROCHLORIDE 25 MG/1
25 TABLET, FILM COATED ORAL
Status: COMPLETED | OUTPATIENT
Start: 2022-09-14 | End: 2022-09-14

## 2022-09-14 RX ORDER — HYDRALAZINE HYDROCHLORIDE 20 MG/ML
10 INJECTION INTRAMUSCULAR; INTRAVENOUS
Status: DISCONTINUED | OUTPATIENT
Start: 2022-09-14 | End: 2022-09-14

## 2022-09-14 RX ORDER — IBUPROFEN 200 MG
16 TABLET ORAL
Status: DISCONTINUED | OUTPATIENT
Start: 2022-09-14 | End: 2022-09-15 | Stop reason: HOSPADM

## 2022-09-14 RX ORDER — POLYETHYLENE GLYCOL 3350 17 G/17G
17 POWDER, FOR SOLUTION ORAL DAILY
Status: DISCONTINUED | OUTPATIENT
Start: 2022-09-14 | End: 2022-09-15 | Stop reason: HOSPADM

## 2022-09-14 RX ORDER — SIMETHICONE 80 MG
1 TABLET,CHEWABLE ORAL 4 TIMES DAILY PRN
Status: DISCONTINUED | OUTPATIENT
Start: 2022-09-14 | End: 2022-09-15 | Stop reason: HOSPADM

## 2022-09-14 RX ORDER — TALC
6 POWDER (GRAM) TOPICAL NIGHTLY PRN
Status: DISCONTINUED | OUTPATIENT
Start: 2022-09-14 | End: 2022-09-15 | Stop reason: HOSPADM

## 2022-09-14 RX ORDER — SODIUM CHLORIDE 9 MG/ML
INJECTION, SOLUTION INTRAVENOUS ONCE
Status: DISCONTINUED | OUTPATIENT
Start: 2022-09-14 | End: 2022-09-15

## 2022-09-14 RX ORDER — ONDANSETRON 2 MG/ML
4 INJECTION INTRAMUSCULAR; INTRAVENOUS
Status: COMPLETED | OUTPATIENT
Start: 2022-09-14 | End: 2022-09-14

## 2022-09-14 RX ORDER — CARVEDILOL 12.5 MG/1
25 TABLET ORAL
Status: COMPLETED | OUTPATIENT
Start: 2022-09-14 | End: 2022-09-14

## 2022-09-14 RX ORDER — HEPARIN SODIUM 5000 [USP'U]/ML
5000 INJECTION, SOLUTION INTRAVENOUS; SUBCUTANEOUS EVERY 8 HOURS
Status: DISCONTINUED | OUTPATIENT
Start: 2022-09-14 | End: 2022-09-15 | Stop reason: HOSPADM

## 2022-09-14 RX ORDER — NITROGLYCERIN 20 MG/100ML
INJECTION INTRAVENOUS
Status: DISPENSED
Start: 2022-09-14 | End: 2022-09-14

## 2022-09-14 RX ORDER — IBUPROFEN 200 MG
24 TABLET ORAL
Status: DISCONTINUED | OUTPATIENT
Start: 2022-09-14 | End: 2022-09-15 | Stop reason: HOSPADM

## 2022-09-14 RX ORDER — SEVELAMER CARBONATE 800 MG/1
800 TABLET, FILM COATED ORAL
Status: DISCONTINUED | OUTPATIENT
Start: 2022-09-14 | End: 2022-09-15 | Stop reason: HOSPADM

## 2022-09-14 RX ORDER — BISACODYL 10 MG
10 SUPPOSITORY, RECTAL RECTAL DAILY PRN
Status: DISCONTINUED | OUTPATIENT
Start: 2022-09-14 | End: 2022-09-15 | Stop reason: HOSPADM

## 2022-09-14 RX ORDER — CARVEDILOL 12.5 MG/1
25 TABLET ORAL 2 TIMES DAILY
Status: DISCONTINUED | OUTPATIENT
Start: 2022-09-14 | End: 2022-09-14

## 2022-09-14 RX ORDER — CARVEDILOL 25 MG/1
25 TABLET ORAL 2 TIMES DAILY WITH MEALS
Status: DISCONTINUED | OUTPATIENT
Start: 2022-09-14 | End: 2022-09-15 | Stop reason: HOSPADM

## 2022-09-14 RX ORDER — NALOXONE HCL 0.4 MG/ML
0.02 VIAL (ML) INJECTION
Status: DISCONTINUED | OUTPATIENT
Start: 2022-09-14 | End: 2022-09-15 | Stop reason: HOSPADM

## 2022-09-14 RX ORDER — MAG HYDROX/ALUMINUM HYD/SIMETH 200-200-20
30 SUSPENSION, ORAL (FINAL DOSE FORM) ORAL 4 TIMES DAILY PRN
Status: DISCONTINUED | OUTPATIENT
Start: 2022-09-14 | End: 2022-09-15 | Stop reason: HOSPADM

## 2022-09-14 RX ORDER — HYDROCODONE BITARTRATE AND ACETAMINOPHEN 5; 325 MG/1; MG/1
1 TABLET ORAL EVERY 6 HOURS PRN
Status: DISCONTINUED | OUTPATIENT
Start: 2022-09-14 | End: 2022-09-15 | Stop reason: HOSPADM

## 2022-09-14 RX ORDER — SODIUM CHLORIDE 0.9 % (FLUSH) 0.9 %
10 SYRINGE (ML) INJECTION
Status: DISCONTINUED | OUTPATIENT
Start: 2022-09-14 | End: 2022-09-15 | Stop reason: HOSPADM

## 2022-09-14 RX ORDER — HYDRALAZINE HYDROCHLORIDE 20 MG/ML
10 INJECTION INTRAMUSCULAR; INTRAVENOUS
Status: COMPLETED | OUTPATIENT
Start: 2022-09-14 | End: 2022-09-14

## 2022-09-14 RX ORDER — HYDROCODONE BITARTRATE AND ACETAMINOPHEN 10; 325 MG/1; MG/1
1 TABLET ORAL EVERY 6 HOURS PRN
Status: DISCONTINUED | OUTPATIENT
Start: 2022-09-14 | End: 2022-09-15 | Stop reason: HOSPADM

## 2022-09-14 RX ORDER — INSULIN ASPART 100 [IU]/ML
10 INJECTION, SOLUTION INTRAVENOUS; SUBCUTANEOUS
Status: DISCONTINUED | OUTPATIENT
Start: 2022-09-14 | End: 2022-09-15 | Stop reason: HOSPADM

## 2022-09-14 RX ORDER — TRAZODONE HYDROCHLORIDE 50 MG/1
50 TABLET ORAL NIGHTLY PRN
Status: DISCONTINUED | OUTPATIENT
Start: 2022-09-14 | End: 2022-09-15 | Stop reason: HOSPADM

## 2022-09-14 RX ORDER — NIFEDIPINE 30 MG/1
60 TABLET, EXTENDED RELEASE ORAL
Status: COMPLETED | OUTPATIENT
Start: 2022-09-14 | End: 2022-09-14

## 2022-09-14 RX ORDER — ACETAMINOPHEN 325 MG/1
650 TABLET ORAL EVERY 8 HOURS PRN
Status: DISCONTINUED | OUTPATIENT
Start: 2022-09-14 | End: 2022-09-15 | Stop reason: HOSPADM

## 2022-09-14 RX ORDER — GLUCAGON 1 MG
1 KIT INJECTION
Status: DISCONTINUED | OUTPATIENT
Start: 2022-09-14 | End: 2022-09-15 | Stop reason: HOSPADM

## 2022-09-14 RX ORDER — NIFEDIPINE 30 MG/1
60 TABLET, EXTENDED RELEASE ORAL 2 TIMES DAILY
Status: DISCONTINUED | OUTPATIENT
Start: 2022-09-14 | End: 2022-09-15 | Stop reason: HOSPADM

## 2022-09-14 RX ORDER — HYDRALAZINE HYDROCHLORIDE 50 MG/1
50 TABLET, FILM COATED ORAL EVERY 8 HOURS
Status: DISCONTINUED | OUTPATIENT
Start: 2022-09-14 | End: 2022-09-15 | Stop reason: HOSPADM

## 2022-09-14 RX ORDER — HYDRALAZINE HYDROCHLORIDE 25 MG/1
25 TABLET, FILM COATED ORAL EVERY 8 HOURS PRN
Status: DISCONTINUED | OUTPATIENT
Start: 2022-09-14 | End: 2022-09-15 | Stop reason: HOSPADM

## 2022-09-14 RX ORDER — SODIUM CHLORIDE 0.9 % (FLUSH) 0.9 %
5 SYRINGE (ML) INJECTION
Status: DISCONTINUED | OUTPATIENT
Start: 2022-09-14 | End: 2022-09-15 | Stop reason: HOSPADM

## 2022-09-14 RX ORDER — NIFEDIPINE 30 MG/1
60 TABLET, EXTENDED RELEASE ORAL ONCE
Status: DISCONTINUED | OUTPATIENT
Start: 2022-09-14 | End: 2022-09-14

## 2022-09-14 RX ORDER — CALCITRIOL 0.25 UG/1
0.5 CAPSULE ORAL DAILY
Status: DISCONTINUED | OUTPATIENT
Start: 2022-09-14 | End: 2022-09-15 | Stop reason: HOSPADM

## 2022-09-14 RX ORDER — INSULIN ASPART 100 [IU]/ML
0-5 INJECTION, SOLUTION INTRAVENOUS; SUBCUTANEOUS
Status: DISCONTINUED | OUTPATIENT
Start: 2022-09-14 | End: 2022-09-15 | Stop reason: HOSPADM

## 2022-09-14 RX ORDER — HYDRALAZINE HYDROCHLORIDE 25 MG/1
25 TABLET, FILM COATED ORAL
Status: DISCONTINUED | OUTPATIENT
Start: 2022-09-14 | End: 2022-09-14

## 2022-09-14 RX ORDER — ASPIRIN 81 MG/1
81 TABLET ORAL DAILY
Status: DISCONTINUED | OUTPATIENT
Start: 2022-09-14 | End: 2022-09-15 | Stop reason: HOSPADM

## 2022-09-14 RX ORDER — ONDANSETRON 8 MG/1
8 TABLET, ORALLY DISINTEGRATING ORAL EVERY 8 HOURS PRN
Status: DISCONTINUED | OUTPATIENT
Start: 2022-09-14 | End: 2022-09-15 | Stop reason: HOSPADM

## 2022-09-14 RX ORDER — VENLAFAXINE HYDROCHLORIDE 37.5 MG/1
37.5 CAPSULE, EXTENDED RELEASE ORAL DAILY
Status: DISCONTINUED | OUTPATIENT
Start: 2022-09-14 | End: 2022-09-15 | Stop reason: HOSPADM

## 2022-09-14 RX ADMIN — ASPIRIN 81 MG: 81 TABLET, COATED ORAL at 03:09

## 2022-09-14 RX ADMIN — VENLAFAXINE HYDROCHLORIDE 37.5 MG: 37.5 CAPSULE, EXTENDED RELEASE ORAL at 07:09

## 2022-09-14 RX ADMIN — HYDRALAZINE HYDROCHLORIDE 10 MG: 20 INJECTION, SOLUTION INTRAMUSCULAR; INTRAVENOUS at 12:09

## 2022-09-14 RX ADMIN — HYDRALAZINE HYDROCHLORIDE 25 MG: 25 TABLET, FILM COATED ORAL at 01:09

## 2022-09-14 RX ADMIN — CALCITRIOL CAPSULES 0.25 MCG 0.5 MCG: 0.25 CAPSULE ORAL at 03:09

## 2022-09-14 RX ADMIN — NITROGLYCERIN 5 MCG/MIN: 20 INJECTION INTRAVENOUS at 07:09

## 2022-09-14 RX ADMIN — HYDRALAZINE HYDROCHLORIDE 25 MG: 25 TABLET, FILM COATED ORAL at 11:09

## 2022-09-14 RX ADMIN — INSULIN DETEMIR 11 UNITS: 100 INJECTION, SOLUTION SUBCUTANEOUS at 03:09

## 2022-09-14 RX ADMIN — CARVEDILOL 25 MG: 12.5 TABLET, FILM COATED ORAL at 11:09

## 2022-09-14 RX ADMIN — ATORVASTATIN CALCIUM 40 MG: 40 TABLET, FILM COATED ORAL at 09:09

## 2022-09-14 RX ADMIN — INSULIN ASPART 2 UNITS: 100 INJECTION, SOLUTION INTRAVENOUS; SUBCUTANEOUS at 07:09

## 2022-09-14 RX ADMIN — SEVELAMER CARBONATE 800 MG: 800 TABLET, FILM COATED ORAL at 09:09

## 2022-09-14 RX ADMIN — HYDRALAZINE HYDROCHLORIDE 50 MG: 50 TABLET ORAL at 11:09

## 2022-09-14 RX ADMIN — NIFEDIPINE 60 MG: 30 TABLET, FILM COATED, EXTENDED RELEASE ORAL at 11:09

## 2022-09-14 RX ADMIN — CARVEDILOL 25 MG: 25 TABLET, FILM COATED ORAL at 05:09

## 2022-09-14 RX ADMIN — ONDANSETRON 4 MG: 2 INJECTION INTRAMUSCULAR; INTRAVENOUS at 07:09

## 2022-09-14 RX ADMIN — HEPARIN SODIUM 5000 UNITS: 5000 INJECTION INTRAVENOUS; SUBCUTANEOUS at 11:09

## 2022-09-14 RX ADMIN — HEPARIN SODIUM 5000 UNITS: 5000 INJECTION INTRAVENOUS; SUBCUTANEOUS at 03:09

## 2022-09-14 RX ADMIN — INSULIN ASPART 10 UNITS: 100 INJECTION, SOLUTION INTRAVENOUS; SUBCUTANEOUS at 05:09

## 2022-09-14 RX ADMIN — NIFEDIPINE 60 MG: 30 TABLET, FILM COATED, EXTENDED RELEASE ORAL at 09:09

## 2022-09-14 RX ADMIN — IOHEXOL 75 ML: 350 INJECTION, SOLUTION INTRAVENOUS at 04:09

## 2022-09-14 RX ADMIN — HYDRALAZINE HYDROCHLORIDE 10 MG: 20 INJECTION, SOLUTION INTRAMUSCULAR; INTRAVENOUS at 07:09

## 2022-09-14 NOTE — HPI
Isabela Read is a 27-year-old female with a history of type 1 diabetes, ESRD on HD (T/Th/Sat), and restrictive lung disease who presents with a chief complaint of shortness of breath. Patient last dialyzed yesterday (9/13/22), but explained that she did not receive a full treatment due to arriving late to dialysis. She became short of breath early this morning which prompted her to come to the ED. She produces minimal UOP at baseline. She endorses having some nausea and vomiting over the past day. She denies CP, fever, cough, aches, chills, abdominal pain, and swelling to her lower extremities. Required BiPAP while in ED due to acute respiratory distress thought to be related to flash pulmonary edema. Nephrology was consulted for management of ESRD/HD.

## 2022-09-14 NOTE — SUBJECTIVE & OBJECTIVE
Past Medical History:   Diagnosis Date    Acute kidney injury superimposed on chronic kidney disease 4/7/2021    Anemia     Chronic hypertension with exacerbation during pregnancy in second trimester 11/6/2020    Current regimen (11/6/20):  - Carvedilol 12.5 mg BID - Nifedipine 30 mg daily Baseline CKD + proteinuria    Chronic kidney disease     Depression     Diabetes mellitus     Diabetic retinopathy     Diarrhea     Encounter for blood transfusion     Gastroparesis     Glaucoma     Hepatomegaly 4/29/2021    Hx of psychiatric care     Hyperlipidemia     Hypertension     Nephrotic syndrome     Nephrotic syndrome 3/4/2021    Palpitations     Poor fetal growth affecting management of mother in second trimester 10/15/2020    Restrictive lung disease     Retinal detachment     Severe pre-eclampsia in second trimester 11/6/2020       Past Surgical History:   Procedure Laterality Date    AV FISTULA PLACEMENT Left 4/7/2021    Procedure: CREATION, AV FISTULA;  Surgeon: Roberto Ryan MD;  [facility];  Service: Peripheral Vascular;  Laterality: Left;    COLONOSCOPY N/A 3/16/2022    Procedure: COLONOSCOPY;  Surgeon: Tavo Kwok MD;  [facility]  Service: Endoscopy;  Laterality: N/A;  Questionable history of delayed gastric emptying longstanding diabetes now on eating pre transplant workup for history of nausea vomiting which seems to have improved with dialysis also chronic diarrhea and history of anemia pre transplant workup for kidney transplant. 3    ESOPHAGOGASTRODUODENOSCOPY N/A 3/16/2022    Procedure: EGD (ESOPHAGOGASTRODUODENOSCOPY);  Surgeon: Tavo Kwok MD;  Location: [facility] (4TH FLR);  Service: Endoscopy;  Laterality: N/A;  Questionable history of delayed gastric emptying longstanding diabetes now on eating pre transplant workup for history of nausea vomiting which seems to have improved with dialysis also chronic diarrhea and  history of anemia pre transplant workup for    FISTULOGRAM N/A 8/11/2021    Procedure: Fistulogram;  Surgeon: Roberto Ryan MD;  Location: [facility] CATH LAB;  Service: Cardiology;  Laterality: N/A;    LASER PHOTOCOAGULATION OF RETINA Right 5/31/2022    Procedure: PHOTOCOAGULATION, RETINA, USING LASER;  Surgeon: Maximilian Montaño MD;  [facility];  Service: Ophthalmology;  Laterality: Right;    PERCUTANEOUS TRANSLUMINAL ANGIOPLASTY OF ARTERIOVENOUS FISTULA N/A 8/11/2021    Procedure: PTA, AV FISTULA;  Surgeon: Roberto Ryan MD;  Location: [facility] CATH LAB;  Service: Cardiology;  Laterality: N/A;    REMOVAL IMPLANT, POSTERIOR SEGMENT, INTRAOCULAR Right 2/1/2022    Procedure: REMOVAL IMPLANT, POSTERIOR SEGMENT, INTRAOCULAR;  Surgeon: Maximilian Montaño MD;  Location: [facility] OR [address] [place] [address];  Service: Ophthalmology;  Laterality: Right;    REPAIR OF RETINAL DETACHMENT WITH VITRECTOMY Right 1/25/2022    Procedure: REPAIR, RETINAL DETACHMENT, WITH VITRECTOMY, MEMBRANE PEEL, LASER, INJECTION OF GAS VS OIL;  Surgeon: Maximilian Montaño MD;  Location: [facility] OR [address] [place] [address];  Service: Ophthalmology;  Laterality: Right;    REVISION OF ARTERIOVENOUS FISTULA Left 12/10/2021    Procedure: REVISION, AV FISTULA with BVT;  Surgeon: Roberto Ryan MD;  Location: [facility] OR 2ND FLR;  Service: Peripheral Vascular;  Laterality: Left;    VITRECTOMY BY PARS PLANA APPROACH Right 2/1/2022    Procedure: VITRECTOMY, PARS PLANA APPROACH;  Surgeon: Maximilian Montaño MD;  [facility];  Service: Ophthalmology;  Laterality: Right;    VITRECTOMY BY PARS PLANA APPROACH Right 5/31/2022    Procedure: VITRECTOMY, PARS PLANA APPROACH;  Surgeon: Maximilian Montaño MD;  Location: [facility] OR [address] [place] [address];  Service: Ophthalmology;  Laterality: Right;       Review of patient's allergies indicates:  No Known Allergies    Current Facility-Administered Medications on File Prior to Encounter   Medication    0.9%  NaCl  infusion     Current Outpatient Medications on File Prior to Encounter   Medication Sig    aspirin (ECOTRIN) 81 MG EC tablet Take 81 mg by mouth once daily.    calcitRIOL (ROCALTROL) 0.5 MCG Cap Take 0.5 mcg by mouth once daily.    carvediloL (COREG) 25 MG tablet Take 25 mg by mouth 2 (two) times daily with meals.    hydrALAZINE (APRESOLINE) 25 MG tablet Take 2 tablets (50 mg total) by mouth every 8 (eight) hours.    insulin detemir U-100 (LEVEMIR FLEXTOUCH) 100 unit/mL (3 mL) SubQ InPn pen 10 units subcutaneously in the morning and 12 units sq at night    insulin lispro (HUMALOG KWIKPEN INSULIN) 100 unit/mL pen Inject 10 units into skin the skin 3 three times daily with meals    multivit-min/folic acid/biotin (HAIR,SKIN AND NAILS,FA-BIOTIN, ORAL) Take 1 tablet by mouth once daily.    NIFEdipine (PROCARDIA-XL) 60 MG (OSM) 24 hr tablet Take 60 mg by mouth 2 (two) times a day.    prednisoLONE acetate (PRED FORTE) 1 % DrpS Place 1 drop into the right eye 4 (four) times daily.    rosuvastatin (CRESTOR) 10 MG tablet TAKE 1 TABLET BY MOUTH ONCE DAILY IN THE EVENING    sevelamer carbonate (RENVELA) 800 mg Tab TAKE 1 TABLET BY MOUTH THREE TIMES DAILY WITH MEALS    traZODone (DESYREL) 50 MG tablet Take 1 tablet (50 mg total) by mouth nightly as needed for Insomnia.    venlafaxine (EFFEXOR XR) 37.5 MG 24 hr capsule Take 1 capsule (37.5 mg total) by mouth once daily.    acetaminophen (TYLENOL) 325 MG tablet Take 1 tablet (325 mg total) by mouth every 6 (six) hours as needed for Pain.    blood sugar diagnostic Strp To check BG 4 - 6 times daily, to use with insurance preferred meter    flash glucose scanning reader (FREESTYLE MARCY 14 DAY READER) Misc 1 each by Misc.(Non-Drug; Combo Route) route once daily.    flash glucose sensor (FREESTYLE MARCY 14 DAY SENSOR) Kit 6 kits by Misc.(Non-Drug; Combo Route) route once daily.    fluticasone propionate (FLONASE) 50 mcg/actuation nasal spray 1 spray by Each Nostril  "route daily as needed for Rhinitis or Allergies.    lancets Misc To check BG 4 - 6 times daily, to use with insurance preferred meter    medroxyPROGESTERone (DEPO-PROVERA) 150 mg/mL Syrg Inject 1 mL (150 mg total) into the muscle once. for 1 dose    pen needle, diabetic 32 gauge x 5/32" Ndle For three times daily injection     Family History       Problem Relation (Age of Onset)    Diabetes Brother    Heart disease Father    Hypertension Mother          Tobacco Use    Smoking status: Never    Smokeless tobacco: Never   Substance and Sexual Activity    Alcohol use: No    Drug use: No    Sexual activity: Not Currently     Partners: Male     Comment: monogamous     Review of Systems   Constitutional:  Negative for chills, diaphoresis, fatigue and fever.   HENT:  Negative for congestion, facial swelling, nosebleeds, sinus pressure and sore throat.    Respiratory:  Positive for cough (hemoptysis) and shortness of breath. Negative for choking, chest tightness and wheezing.    Cardiovascular:  Negative for chest pain, palpitations and leg swelling.   Gastrointestinal:  Positive for nausea and vomiting. Negative for abdominal distention, abdominal pain, blood in stool, constipation and diarrhea.   Genitourinary:  Positive for decreased urine volume (ESRD).   Musculoskeletal:  Negative for arthralgias, back pain, gait problem and joint swelling.   Skin:  Negative for color change and pallor.   Neurological:  Negative for dizziness, tremors, weakness, light-headedness, numbness and headaches.   Psychiatric/Behavioral:  Negative for agitation and behavioral problems.    Objective:     Vital Signs (Most Recent):  Temp: 98.7 °F (37.1 °C) (09/14/22 1417)  Pulse: (!) 113 (09/14/22 1417)  Resp: 18 (09/14/22 1417)  BP: (!) 141/67 (09/14/22 1417)  SpO2: 98 % (09/14/22 1417)   Vital Signs (24h Range):  Temp:  [98.3 °F (36.8 °C)-98.7 °F (37.1 °C)] 98.7 °F (37.1 °C)  Pulse:  [108-121] 113  Resp:  [16-33] 18  SpO2:  [88 %-100 %] " 98 %  BP: (141-256)/() 141/67     Weight: 61 kg (134 lb 7.7 oz)  Body mass index is 23.08 kg/m².    Physical Exam  Vitals and nursing note reviewed.   Constitutional:       General: She is not in acute distress.     Appearance: Normal appearance. She is obese. She is not ill-appearing.   HENT:      Head: Normocephalic and atraumatic.      Nose: Nose normal. No congestion.      Mouth/Throat:      Mouth: Mucous membranes are moist.      Pharynx: Oropharynx is clear.   Eyes:      Extraocular Movements: Extraocular movements intact.      Conjunctiva/sclera: Conjunctivae normal.      Pupils: Pupils are equal, round, and reactive to light.   Cardiovascular:      Rate and Rhythm: Regular rhythm. Tachycardia present.      Pulses: Normal pulses.      Heart sounds: Normal heart sounds. No murmur heard.  Pulmonary:      Effort: Pulmonary effort is normal.      Breath sounds: Rales (faint bibasilar) present. No wheezing or rhonchi.      Comments: 98% on 1L  Chest:      Chest wall: No tenderness.   Abdominal:      General: Abdomen is flat. Bowel sounds are normal. There is no distension.      Palpations: Abdomen is soft.      Tenderness: There is no abdominal tenderness. There is no right CVA tenderness, left CVA tenderness, guarding or rebound.   Musculoskeletal:      Right lower leg: No edema.      Left lower leg: No edema.   Skin:     General: Skin is warm and dry.      Capillary Refill: Capillary refill takes less than 2 seconds.      Coloration: Skin is not jaundiced.      Findings: No lesion.   Neurological:      General: No focal deficit present.      Mental Status: She is alert and oriented to person, place, and time. Mental status is at baseline.   Psychiatric:         Mood and Affect: Mood normal.         Behavior: Behavior normal.         Thought Content: Thought content normal.         Judgment: Judgment normal.         CRANIAL NERVES     CN III, IV, VI   Pupils are equal, round, and reactive to light.      Significant Labs: All pertinent labs within the past 24 hours have been reviewed.  ABGs: No results for input(s): PH, PCO2, HCO3, POCSATURATED, BE, TOTALHB, COHB, METHB, O2HB, POCFIO2, PO2 in the last 48 hours.  Blood Culture: No results for input(s): LABBLOO in the last 48 hours.  CBC:   Recent Labs   Lab 09/14/22  0650   WBC 8.03   HGB 11.1*   HCT 33.2*        CMP:   Recent Labs   Lab 09/14/22  0650   *   K 5.1   CL 93*   CO2 27   *   BUN 37*   CREATININE 7.5*   CALCIUM 8.5*   PROT 7.8   ALBUMIN 3.4*   BILITOT 0.7   ALKPHOS 103   AST 30   ALT 13   ANIONGAP 13     Cardiac Markers:   Recent Labs   Lab 09/14/22  0650   BNP 1,768*     Coagulation: No results for input(s): PT, INR, APTT in the last 48 hours.  Lipase: No results for input(s): LIPASE in the last 48 hours.  Magnesium: No results for input(s): MG in the last 48 hours.  POCT Glucose:   Recent Labs   Lab 09/14/22  0938   POCTGLUCOSE 294*     TSH: No results for input(s): TSH in the last 4320 hours.    Significant Imaging: I have reviewed all pertinent imaging results/findings within the past 24 hours.

## 2022-09-14 NOTE — CONSULTS
[facility] Emergency Dept  Nephrology  Consult Note    Patient Name: Isabela Read  MRN: [id number]  Admission Date: 9/14/2022  Hospital Length of Stay: 0 days  Attending Provider: Kassidy Alexander MD   Primary Care Physician: Tavo Bhatia MD  Principal Problem:<principal problem not specified>    Inpatient consult to Nephrology  Consult performed by: Clayton Lovell NP  Consult ordered by: Tesfaye Warner MD  Reason for consult: ESRD on HD         Subjective:     HPI: Isabela Read is a 27-year-old female with a history of type 1 diabetes, ESRD on HD (T/Th/Sat), and restrictive lung disease who presents with a chief complaint of shortness of breath. Patient last dialyzed yesterday (9/13/22), but explained that she did not receive a full treatment due to arriving late to dialysis. She became short of breath early this morning which prompted her to come to the ED. She produces minimal UOP at baseline. She endorses having some nausea and vomiting over the past day. She denies CP, fever, cough, aches, chills, abdominal pain, and swelling to her lower extremities. Required BiPAP while in ED due to acute respiratory distress thought to be related to flash pulmonary edema. Patient also required nitro gtt while in ED for SBP of 200's. Nephrology was consulted for management of ESRD/HD.       Past Medical History:   Diagnosis Date    Acute kidney injury superimposed on chronic kidney disease 4/7/2021    Anemia     Chronic hypertension with exacerbation during pregnancy in second trimester 11/6/2020    Current regimen (11/6/20):  - Carvedilol 12.5 mg BID - Nifedipine 30 mg daily Baseline CKD + proteinuria    Chronic kidney disease     Depression     Diabetes mellitus     Diabetic retinopathy     Diarrhea     Encounter for blood transfusion     Gastroparesis     Glaucoma     Hepatomegaly 4/29/2021    Hx of psychiatric care     Hyperlipidemia     Hypertension     Nephrotic syndrome     Nephrotic syndrome  3/4/2021    Palpitations     Poor fetal growth affecting management of mother in second trimester 10/15/2020    Restrictive lung disease     Retinal detachment     Severe pre-eclampsia in second trimester 11/6/2020       Past Surgical History:   Procedure Laterality Date    AV FISTULA PLACEMENT Left 4/7/2021    Procedure: CREATION, AV FISTULA;  Surgeon: Roberto Ryan MD;  Location: [facility] OR 2ND FLR;  Service: Peripheral Vascular;  Laterality: Left;    COLONOSCOPY N/A 3/16/2022    Procedure: COLONOSCOPY;  Surgeon: Tavo Kwok MD;  Location: [facility] (4TH FLR);  Service: Endoscopy;  Laterality: N/A;  Questionable history of delayed gastric emptying longstanding diabetes now on eating pre transplant workup for history of nausea vomiting which seems to have improved with dialysis also chronic diarrhea and history of anemia pre transplant workup for kidney transplant. 3    ESOPHAGOGASTRODUODENOSCOPY N/A 3/16/2022    Procedure: EGD (ESOPHAGOGASTRODUODENOSCOPY);  Surgeon: Tavo Kwok MD;  Location: [facility] (4TH FLR);  Service: Endoscopy;  Laterality: N/A;  Questionable history of delayed gastric emptying longstanding diabetes now on eating pre transplant workup for history of nausea vomiting which seems to have improved with dialysis also chronic diarrhea and history of anemia pre transplant workup for    FISTULOGRAM N/A 8/11/2021    Procedure: Fistulogram;  Surgeon: Roberto Ryan MD;  Location: [facility] CATH LAB;  Service: Cardiology;  Laterality: N/A;    LASER PHOTOCOAGULATION OF RETINA Right 5/31/2022    Procedure: PHOTOCOAGULATION, RETINA, USING LASER;  Surgeon: Maximilian Montaño MD;  Location: [facility] OR 1ST FLR;  Service: Ophthalmology;  Laterality: Right;    PERCUTANEOUS TRANSLUMINAL ANGIOPLASTY OF ARTERIOVENOUS FISTULA N/A 8/11/2021    Procedure: PTA, AV FISTULA;  Surgeon: Roberto Ryan MD;  Location: [facility] CATH LAB;  Service: Cardiology;  Laterality: N/A;    REMOVAL IMPLANT,  POSTERIOR SEGMENT, INTRAOCULAR Right 2/1/2022    Procedure: REMOVAL IMPLANT, POSTERIOR SEGMENT, INTRAOCULAR;  Surgeon: Maximilian Montaño MD;  Location: [facility] OR 1ST FLR;  Service: Ophthalmology;  Laterality: Right;    REPAIR OF RETINAL DETACHMENT WITH VITRECTOMY Right 1/25/2022    Procedure: REPAIR, RETINAL DETACHMENT, WITH VITRECTOMY, MEMBRANE PEEL, LASER, INJECTION OF GAS VS OIL;  Surgeon: Maximilian Montaño MD;  Location: [facility] OR 1ST FLR;  Service: Ophthalmology;  Laterality: Right;    REVISION OF ARTERIOVENOUS FISTULA Left 12/10/2021    Procedure: REVISION, AV FISTULA with BVT;  Surgeon: Roberto Ryan MD;  Location: [facility] OR 2ND FLR;  Service: Peripheral Vascular;  Laterality: Left;    VITRECTOMY BY PARS PLANA APPROACH Right 2/1/2022    Procedure: VITRECTOMY, PARS PLANA APPROACH;  Surgeon: Maximilian Montaño MD;  Location: [facility] OR 1ST FLR;  Service: Ophthalmology;  Laterality: Right;    VITRECTOMY BY PARS PLANA APPROACH Right 5/31/2022    Procedure: VITRECTOMY, PARS PLANA APPROACH;  Surgeon: Maximilian Montaño MD;  Location: [facility] OR 1ST FLR;  Service: Ophthalmology;  Laterality: Right;       Review of patient's allergies indicates:  No Known Allergies  Current Facility-Administered Medications   Medication Frequency    0.9%  NaCl infusion Once    hydrALAZINE injection 10 mg ED 1 Time    nitroGLYCERIN 50 mg in dextrose 5 % 250 mL infusion (TITRATING) Continuous     Current Outpatient Medications   Medication    acetaminophen (TYLENOL) 325 MG tablet    aspirin (ECOTRIN) 81 MG EC tablet    blood sugar diagnostic Strp    calcitRIOL (ROCALTROL) 0.5 MCG Cap    carvediloL (COREG) 25 MG tablet    flash glucose scanning reader (FREESTYLE MARCY 14 DAY READER) Misc    flash glucose sensor (FREESTYLE MARCY 14 DAY SENSOR) Kit    fluticasone propionate (FLONASE) 50 mcg/actuation nasal spray    hydrALAZINE (APRESOLINE) 25 MG tablet    insulin detemir U-100 (LEVEMIR FLEXTOUCH) 100 unit/mL (3 mL) SubQ InPn pen     "insulin lispro (HUMALOG KWIKPEN INSULIN) 100 unit/mL pen    lancets Misc    medroxyPROGESTERone (DEPO-PROVERA) 150 mg/mL Syrg    multivit-min/folic acid/biotin (HAIR,SKIN AND NAILS,FA-BIOTIN, ORAL)    NIFEdipine (PROCARDIA-XL) 60 MG (OSM) 24 hr tablet    pen needle, diabetic 32 gauge x 5/32" Ndle    prednisoLONE acetate (PRED FORTE) 1 % DrpS    rosuvastatin (CRESTOR) 10 MG tablet    sevelamer carbonate (RENVELA) 800 mg Tab    traZODone (DESYREL) 50 MG tablet    venlafaxine (EFFEXOR XR) 37.5 MG 24 hr capsule     Facility-Administered Medications Ordered in Other Encounters   Medication Frequency    0.9%  NaCl infusion Continuous     Family History       Problem Relation (Age of Onset)    Diabetes Brother    Heart disease Father    Hypertension Mother          Tobacco Use    Smoking status: Never    Smokeless tobacco: Never   Substance and Sexual Activity    Alcohol use: No    Drug use: No    Sexual activity: Not Currently     Partners: Male     Comment: monogamous     Review of Systems   Constitutional: Negative.    HENT: Negative.     Eyes: Negative.    Respiratory:  Positive for shortness of breath.    Cardiovascular: Negative.    Gastrointestinal:  Positive for nausea and vomiting.   Genitourinary:  Positive for decreased urine volume (minimal UOP at baseline).   Musculoskeletal: Negative.    Skin: Negative.    Neurological: Negative.    Psychiatric/Behavioral: Negative.     Objective:     Vital Signs (Most Recent):  Temp: 98.3 °F (36.8 °C) (09/14/22 0612)  Pulse: 110 (09/14/22 1200)  Resp: 20 (09/14/22 1200)  BP: (!) 212/102 (09/14/22 1200)  SpO2: 96 % (09/14/22 1200)  O2 Device (Oxygen Therapy): nasal cannula (09/14/22 1200)   Vital Signs (24h Range):  Temp:  [98.3 °F (36.8 °C)] 98.3 °F (36.8 °C)  Pulse:  [108-118] 110  Resp:  [16-33] 20  SpO2:  [88 %-100 %] 96 %  BP: (163-256)/() 212/102     Weight: 61 kg (134 lb 7.7 oz) (09/14/22 0612)  Body mass index is 23.08 kg/m².  Body surface area is 1.66 meters " squared.    No intake/output data recorded.    Physical Exam  Constitutional:       General: She is in acute distress.      Appearance: She is ill-appearing.   HENT:      Head: Normocephalic and atraumatic.      Nose: Nose normal.      Mouth/Throat:      Mouth: Mucous membranes are moist.      Pharynx: Oropharynx is clear.   Eyes:      Conjunctiva/sclera: Conjunctivae normal.   Cardiovascular:      Rate and Rhythm: Regular rhythm. Tachycardia present.      Comments: LUE AVF with +thrill and + bruit   Pulmonary:      Breath sounds: Rales present.      Comments: BiPAP  Abdominal:      General: Abdomen is flat.      Palpations: Abdomen is soft.   Musculoskeletal:         General: Normal range of motion.      Cervical back: Normal range of motion and neck supple.   Skin:     General: Skin is warm and dry.   Neurological:      General: No focal deficit present.      Mental Status: She is alert and oriented to person, place, and time.   Psychiatric:         Mood and Affect: Mood normal.         Behavior: Behavior normal.       Significant Labs:  CBC:   Recent Labs   Lab 09/14/22  0650   WBC 8.03   RBC 3.39*   HGB 11.1*   HCT 33.2*      MCV 98   MCH 32.7*   MCHC 33.4     CMP:   Recent Labs   Lab 09/14/22  0650   *   CALCIUM 8.5*   ALBUMIN 3.4*   PROT 7.8   *   K 5.1   CO2 27   CL 93*   BUN 37*   CREATININE 7.5*   ALKPHOS 103   ALT 13   AST 30   BILITOT 0.7     All labs within the past 24 hours have been reviewed.      Assessment/Plan:     Chronic kidney disease-mineral and bone disorder  -Trend phos levels. Will evaluate need for phos binders   -Resume home dose of calcitriol once patient is able to take po meds   -Low phos diet     ESRD on hemodialysis  Outpatient HD Information:    -Out[facility] HD [facility]   -HD tx days: T/Th/Sat  -HD tx time: 3.5hrs   -Last HD tx prior to presentation: 9/13/22  -HD access: LUE AVF   -HD modality: iHD    -Residual urine: Minimal UOP at baseline        Plan/Recommendations:     -HD today for metabolic clearance and volume management   -Patient seen and examined while on HD, tolerating treatment well, vitals stable, labs reviewed and baths adjusted, UFG of 3.5L as tolerated    -Renal diet, if not NPO   -Strict I/O's and daily weights  -Daily renal function panels   -Renally dose meds       Anemia due to chronic kidney disease, on chronic dialysis  -Transfuse for Hg <7.0        Thank you for your consult. I will follow-up with patient. Please contact us if you have any additional questions.    Clayton Lovell, NP  Nephrology  [facility]

## 2022-09-14 NOTE — PROGRESS NOTES
HD initiated, , cannulated left arm avf with 15 gauge needles , Good blood flow . B/P 202/94 , pulse 110.  Pulse 110/, patient on bipap , o2 sat 100% . Patient alert and stable . Uf net goal set for 3500 as tolerated   weight loss/fatigue

## 2022-09-14 NOTE — H&P
"[facility] - Emergency Dept  [facility] Medicine  History & Physical    Patient Name: Isabela Read  MRN: [id number]  Patient Class: OP- Observation  Admission Date: 9/14/2022  Attending Physician: Kassidy Alexander MD   Primary Care Provider: Tavo Bhatia MD         Patient information was obtained from patient and ER records.     Subjective:     Principal Problem:Acute and chronic respiratory failure with hypoxia    Chief Complaint:   Chief Complaint   Patient presents with    Shortness of Breath     Pt states "I have fluid in my chest". C/o difficulty breathing, nausea, and hematemesis. Extensive medical hx,. Including GEORGE, dialysis (Tues, Thurs, Saturday), and pulmonary edema.         HPI: Isabela Read is a 27-year-old female with a history of type 1 diabetes, ESRD on HD (T/Th/Sat), and restrictive lung disease who presents acute onset of shortness of breath. Patient reports shortened HD session yesterday (9/13) with 1.9 L fluid removal (usual 2.8L) due to oversleeping and issues with machine. Patient experienced acute onset of orthopnea, SOB, nausea, vomiting, and hemoptysis so presented to ED. She reports history of hemoptysis in May associated with hypervolemia. She is on depo-provera for birth control, no recent surgeries, trips. She denies any leg swelling or chest pain. She also endorses difficulty with blood pressure control. She was taken off hydralazine ~month ago due to hypotension, but over past two weeks blood pressure has been uncontrolled. She restarted hydralazine 50 mg BID X 1 week but continues to have elevated BP readings (SBP ~180s). Patient denies fever, chills, diaphoresis, abdominal pain, focal numbness, weakness, HA. She makes a small amount of urine.     In ED, patient tachycardic to 121, /145, 88% on RA. Patient placed on BIPAP and given IV hydralazine, home coreg, nifedipine, and started on nitro gtt. CXR with pulmonary edema. Underwent HD with 3.5 L removed, able to " wean to 2L and d/c nitro gtt. BP remained uncontrolled and given additional IV hydralazine with improvement to 170/81. D-dimer positive. Discussed with nephrology, ok for CTA, ordered. Patient admitted to observation for HTN urgency.       Past Medical History:   Diagnosis Date    Acute kidney injury superimposed on chronic kidney disease 4/7/2021    Anemia     Chronic hypertension with exacerbation during pregnancy in second trimester 11/6/2020    Current regimen (11/6/20):  - Carvedilol 12.5 mg BID - Nifedipine 30 mg daily Baseline CKD + proteinuria    Chronic kidney disease     Depression     Diabetes mellitus     Diabetic retinopathy     Diarrhea     Encounter for blood transfusion     Gastroparesis     Glaucoma     Hepatomegaly 4/29/2021    Hx of psychiatric care     Hyperlipidemia     Hypertension     Nephrotic syndrome     Nephrotic syndrome 3/4/2021    Palpitations     Poor fetal growth affecting management of mother in second trimester 10/15/2020    Restrictive lung disease     Retinal detachment     Severe pre-eclampsia in second trimester 11/6/2020       Past Surgical History:   Procedure Laterality Date    AV FISTULA PLACEMENT Left 4/7/2021    Procedure: CREATION, AV FISTULA;  Surgeon: Roberto Ryan MD;  Location: [facility] OR 2ND FLR;  Service: Peripheral Vascular;  Laterality: Left;    COLONOSCOPY N/A 3/16/2022    Procedure: COLONOSCOPY;  Surgeon: Tavo Kwok MD;  Location: [facility] (4TH FLR);  Service: Endoscopy;  Laterality: N/A;  Questionable history of delayed gastric emptying longstanding diabetes now on eating pre transplant workup for history of nausea vomiting which seems to have improved with dialysis also chronic diarrhea and history of anemia pre transplant workup for kidney transplant. 3    ESOPHAGOGASTRODUODENOSCOPY N/A 3/16/2022    Procedure: EGD (ESOPHAGOGASTRODUODENOSCOPY);  Surgeon: Tavo Kwok MD;  Location: [facility] ENDO (4TH FLR);  Service:  Endoscopy;  Laterality: N/A;  Questionable history of delayed gastric emptying longstanding diabetes now on eating pre transplant workup for history of nausea vomiting which seems to have improved with dialysis also chronic diarrhea and history of anemia pre transplant workup for    FISTULOGRAM N/A 8/11/2021    Procedure: Fistulogram;  Surgeon: Roberto Ryan MD;  Location: [facility] CATH LAB;  Service: Cardiology;  Laterality: N/A;    LASER PHOTOCOAGULATION OF RETINA Right 5/31/2022    Procedure: PHOTOCOAGULATION, RETINA, USING LASER;  Surgeon: Maximilian Montaño MD;  Location: [facility] OR 1ST FLR;  Service: Ophthalmology;  Laterality: Right;    PERCUTANEOUS TRANSLUMINAL ANGIOPLASTY OF ARTERIOVENOUS FISTULA N/A 8/11/2021    Procedure: PTA, AV FISTULA;  Surgeon: Roberto Ryan MD;  Location: [facility] CATH LAB;  Service: Cardiology;  Laterality: N/A;    REMOVAL IMPLANT, POSTERIOR SEGMENT, INTRAOCULAR Right 2/1/2022    Procedure: REMOVAL IMPLANT, POSTERIOR SEGMENT, INTRAOCULAR;  Surgeon: Maximilian Montaño MD;  Location: [facility] OR 1ST FLR;  Service: Ophthalmology;  Laterality: Right;    REPAIR OF RETINAL DETACHMENT WITH VITRECTOMY Right 1/25/2022    Procedure: REPAIR, RETINAL DETACHMENT, WITH VITRECTOMY, MEMBRANE PEEL, LASER, INJECTION OF GAS VS OIL;  Surgeon: Maximilian Montaño MD;  Location: [facility] OR 1ST FLR;  Service: Ophthalmology;  Laterality: Right;    REVISION OF ARTERIOVENOUS FISTULA Left 12/10/2021    Procedure: REVISION, AV FISTULA with BVT;  Surgeon: Roberto Ryan MD;  Location: [facility] OR 2ND FLR;  Service: Peripheral Vascular;  Laterality: Left;    VITRECTOMY BY PARS PLANA APPROACH Right 2/1/2022    Procedure: VITRECTOMY, PARS PLANA APPROACH;  Surgeon: Maximilian Montaño MD;  Location: [facility] OR 1ST FLR;  Service: Ophthalmology;  Laterality: Right;    VITRECTOMY BY PARS PLANA APPROACH Right 5/31/2022    Procedure: VITRECTOMY, PARS PLANA APPROACH;  Surgeon: Maximilian Montaño MD;  Location:  [facility] OR 1ST FLR;  Service: Ophthalmology;  Laterality: Right;       Review of patient's allergies indicates:  No Known Allergies    Current Facility-Administered Medications on File Prior to Encounter   Medication    0.9%  NaCl infusion     Current Outpatient Medications on File Prior to Encounter   Medication Sig    aspirin (ECOTRIN) 81 MG EC tablet Take 81 mg by mouth once daily.    calcitRIOL (ROCALTROL) 0.5 MCG Cap Take 0.5 mcg by mouth once daily.    carvediloL (COREG) 25 MG tablet Take 25 mg by mouth 2 (two) times daily with meals.    hydrALAZINE (APRESOLINE) 25 MG tablet Take 2 tablets (50 mg total) by mouth every 8 (eight) hours.    insulin detemir U-100 (LEVEMIR FLEXTOUCH) 100 unit/mL (3 mL) SubQ InPn pen 10 units subcutaneously in the morning and 12 units sq at night    insulin lispro (HUMALOG KWIKPEN INSULIN) 100 unit/mL pen Inject 10 units into skin the skin 3 three times daily with meals    multivit-min/folic acid/biotin (HAIR,SKIN AND NAILS,FA-BIOTIN, ORAL) Take 1 tablet by mouth once daily.    NIFEdipine (PROCARDIA-XL) 60 MG (OSM) 24 hr tablet Take 60 mg by mouth 2 (two) times a day.    prednisoLONE acetate (PRED FORTE) 1 % DrpS Place 1 drop into the right eye 4 (four) times daily.    rosuvastatin (CRESTOR) 10 MG tablet TAKE 1 TABLET BY MOUTH ONCE DAILY IN THE EVENING    sevelamer carbonate (RENVELA) 800 mg Tab TAKE 1 TABLET BY MOUTH THREE TIMES DAILY WITH MEALS    traZODone (DESYREL) 50 MG tablet Take 1 tablet (50 mg total) by mouth nightly as needed for Insomnia.    venlafaxine (EFFEXOR XR) 37.5 MG 24 hr capsule Take 1 capsule (37.5 mg total) by mouth once daily.    acetaminophen (TYLENOL) 325 MG tablet Take 1 tablet (325 mg total) by mouth every 6 (six) hours as needed for Pain.    blood sugar diagnostic Strp To check BG 4 - 6 times daily, to use with insurance preferred meter    flash glucose scanning reader (FREESTYLE MARCY 14 DAY READER) Misc 1 each by Misc.(Non-Drug; Combo  "Route) route once daily.    flash glucose sensor (FREESTYLE MARCY 14 DAY SENSOR) Kit 6 kits by Misc.(Non-Drug; Combo Route) route once daily.    fluticasone propionate (FLONASE) 50 mcg/actuation nasal spray 1 spray by Each Nostril route daily as needed for Rhinitis or Allergies.    lancets Misc To check BG 4 - 6 times daily, to use with insurance preferred meter    medroxyPROGESTERone (DEPO-PROVERA) 150 mg/mL Syrg Inject 1 mL (150 mg total) into the muscle once. for 1 dose    pen needle, diabetic 32 gauge x 5/32" Ndle For three times daily injection     Family History       Problem Relation (Age of Onset)    Diabetes Brother    Heart disease Father    Hypertension Mother          Tobacco Use    Smoking status: Never    Smokeless tobacco: Never   Substance and Sexual Activity    Alcohol use: No    Drug use: No    Sexual activity: Not Currently     Partners: Male     Comment: monogamous     Review of Systems   Constitutional:  Negative for chills, diaphoresis, fatigue and fever.   HENT:  Negative for congestion, facial swelling, nosebleeds, sinus pressure and sore throat.    Respiratory:  Positive for cough (hemoptysis) and shortness of breath. Negative for choking, chest tightness and wheezing.    Cardiovascular:  Negative for chest pain, palpitations and leg swelling.   Gastrointestinal:  Positive for nausea and vomiting. Negative for abdominal distention, abdominal pain, blood in stool, constipation and diarrhea.   Genitourinary:  Positive for decreased urine volume (ESRD).   Musculoskeletal:  Negative for arthralgias, back pain, gait problem and joint swelling.   Skin:  Negative for color change and pallor.   Neurological:  Negative for dizziness, tremors, weakness, light-headedness, numbness and headaches.   Psychiatric/Behavioral:  Negative for agitation and behavioral problems.    Objective:     Vital Signs (Most Recent):  Temp: 98.7 °F (37.1 °C) (09/14/22 1417)  Pulse: (!) 113 (09/14/22 1417)  Resp: " 18 (09/14/22 1417)  BP: (!) 141/67 (09/14/22 1417)  SpO2: 98 % (09/14/22 1417)   Vital Signs (24h Range):  Temp:  [98.3 °F (36.8 °C)-98.7 °F (37.1 °C)] 98.7 °F (37.1 °C)  Pulse:  [108-121] 113  Resp:  [16-33] 18  SpO2:  [88 %-100 %] 98 %  BP: (141-256)/() 141/67     Weight: 61 kg (134 lb 7.7 oz)  Body mass index is 23.08 kg/m².    Physical Exam  Vitals and nursing note reviewed.   Constitutional:       General: She is not in acute distress.     Appearance: Normal appearance. She is obese. She is not ill-appearing.   HENT:      Head: Normocephalic and atraumatic.      Nose: Nose normal. No congestion.      Mouth/Throat:      Mouth: Mucous membranes are moist.      Pharynx: Oropharynx is clear.   Eyes:      Extraocular Movements: Extraocular movements intact.      Conjunctiva/sclera: Conjunctivae normal.      Pupils: Pupils are equal, round, and reactive to light.   Cardiovascular:      Rate and Rhythm: Regular rhythm. Tachycardia present.      Pulses: Normal pulses.      Heart sounds: Normal heart sounds. No murmur heard.  Pulmonary:      Effort: Pulmonary effort is normal.      Breath sounds: Rales (faint bibasilar) present. No wheezing or rhonchi.      Comments: 98% on 1L  Chest:      Chest wall: No tenderness.   Abdominal:      General: Abdomen is flat. Bowel sounds are normal. There is no distension.      Palpations: Abdomen is soft.      Tenderness: There is no abdominal tenderness. There is no right CVA tenderness, left CVA tenderness, guarding or rebound.   Musculoskeletal:      Right lower leg: No edema.      Left lower leg: No edema.   Skin:     General: Skin is warm and dry.      Capillary Refill: Capillary refill takes less than 2 seconds.      Coloration: Skin is not jaundiced.      Findings: No lesion.   Neurological:      General: No focal deficit present.      Mental Status: She is alert and oriented to person, place, and time. Mental status is at baseline.   Psychiatric:         Mood and Affect:  Mood normal.         Behavior: Behavior normal.         Thought Content: Thought content normal.         Judgment: Judgment normal.         CRANIAL NERVES     CN III, IV, VI   Pupils are equal, round, and reactive to light.     Significant Labs: All pertinent labs within the past 24 hours have been reviewed.  ABGs: No results for input(s): PH, PCO2, HCO3, POCSATURATED, BE, TOTALHB, COHB, METHB, O2HB, POCFIO2, PO2 in the last 48 hours.  Blood Culture: No results for input(s): LABBLOO in the last 48 hours.  CBC:   Recent Labs   Lab 09/14/22  0650   WBC 8.03   HGB 11.1*   HCT 33.2*        CMP:   Recent Labs   Lab 09/14/22  0650   *   K 5.1   CL 93*   CO2 27   *   BUN 37*   CREATININE 7.5*   CALCIUM 8.5*   PROT 7.8   ALBUMIN 3.4*   BILITOT 0.7   ALKPHOS 103   AST 30   ALT 13   ANIONGAP 13     Cardiac Markers:   Recent Labs   Lab 09/14/22  0650   BNP 1,768*     Coagulation: No results for input(s): PT, INR, APTT in the last 48 hours.  Lipase: No results for input(s): LIPASE in the last 48 hours.  Magnesium: No results for input(s): MG in the last 48 hours.  POCT Glucose:   Recent Labs   Lab 09/14/22  0938   POCTGLUCOSE 294*     TSH: No results for input(s): TSH in the last 4320 hours.    Significant Imaging: I have reviewed all pertinent imaging results/findings within the past 24 hours.    Assessment/Plan:     * Acute and chronic respiratory failure with hypoxia  Flash pulmonary edema  ESRD on HD  Patient with Hypoxic Respiratory failure which is Acute.  she is not on home oxygen. Supplemental oxygen was provided and noted- Oxygen Concentration (%):  [50] 50.   Signs/symptoms of respiratory failure include- tachypnea and wheezing. Contributing diagnoses includes - flash pulmonary edema Labs and images were reviewed. Patient Has not had a recent ABG. Will treat underlying causes and adjust management of respiratory failure as follows-    - in setting of incomplete HD session on 9/13 (on HD TThSat)  -  placed on BIPAP on admit, currently on 1L, wean as tolerated  - BNP 1768, CXR with pulmonary edema  - Nephrology consulted, underwent HD with 3.5L removal with resolution of SOB  - renal diet, 1.5 L fluid restriction    Sinus tachycardia  - sinus tachycardia with rates 108-121, hemoptysis, on depo-provera  - EKG with sinus tachycardia  - D-dimer positive, stat CTA pending   - discussed with nephrology, cleared for contrast  - TSH  - afebrile without leukocytosis, CXR with flash pulmonary edema, unable to rule out underlying infection, however SOB resolved with HD- do not suspect tachycardia a sign of sepsis  - tele    Hypertensive urgency  HTN CKD/ Renovascular HTN  Patient has a current diagnosis of hypertensive urgency (without evidence of end organ damage) which is controlled.  Latest blood pressure and vitals reviewed-   Temp:  [98.3 °F (36.8 °C)]   Pulse:  [108-121]   Resp:  [16-33]   BP: (163-256)/()   SpO2:  [88 %-100 %] .   Patient currently off IV antihypertensives.   Home meds for hypertension were reviewed and noted below.   Hypertension Medications             carvediloL (COREG) 25 MG tablet Take 25 mg by mouth 2 (two) times daily with meals.    hydrALAZINE (APRESOLINE) 25 MG tablet Take 2 tablets (50 mg total) by mouth every 8 (eight) hours.    NIFEdipine (PROCARDIA-XL) 60 MG (OSM) 24 hr tablet Take 60 mg by mouth 2 (two) times a day.        - patient with labile BP, taken off hydralazine ~ 1 month ago for hypotension, restarted at 50 mg BID X 1 week without significant improvement  - s/p nitro gtt, IV hydralazine X2 in ED  - /67--> will avoid further reduction at this time   - Will aim for controlled BP reduction by medications noted above. Monitor and mitigate end organ damage as indicated.  - cardiac diet     Chronic kidney disease-mineral and bone disorder  - trend phos levels  - continue home calcitriol    ESRD on hemodialysis  - nephrology consulted, see above      (HFpEF) heart  failure with preserved ejection fraction  - volume removal via HD  Results for orders placed during the hospital encounter of 08/05/22    Echo    Interpretation Summary  · The left ventricle is normal in size with concentric remodeling and normal systolic function. The estimated ejection fraction is 55%.  · Normal right ventricular size with normal right ventricular systolic function.  · Indeterminate left ventricular diastolic function.  · Mild left atrial enlargement.  · Normal central venous pressure (3 mmHg).        Anemia due to chronic kidney disease, on chronic dialysis  - Hgb 11.1, at baseline  - monitor    Restrictive lung disease  - duonebs PRN    Mixed hyperlipidemia  - continue statin    Type 1 diabetes mellitus with end-stage renal disease (ESRD)  Patient's FSGs are controlled on current medication regimen.  Last A1c reviewed-   Lab Results   Component Value Date    HGBA1C 8.3 (H) 05/08/2022     Most recent fingerstick glucose reviewed-   Recent Labs   Lab 09/14/22  0938   POCTGLUCOSE 294*     Current correctional scale  Low  Maintain anti-hyperglycemic dose as follows-   Antihyperglycemics (From admission, onward)    Start     Stop Route Frequency Ordered    09/14/22 1645  insulin aspart U-100 pen 10 Units         -- SubQ 3 times daily with meals 09/14/22 1449    09/14/22 1600  insulin detemir U-100 pen 11 Units         -- SubQ 2 times daily 09/14/22 1449    09/14/22 1437  insulin aspart U-100 pen 0-5 Units         -- SubQ Before meals & nightly PRN 09/14/22 1340        Hold Oral hypoglycemics while patient is in the hospital.  - diabetic diet      VTE Risk Mitigation (From admission, onward)         Ordered     heparin (porcine) injection 5,000 Units  Every 8 hours         09/14/22 1340     IP VTE HIGH RISK PATIENT  Once         09/14/22 1340     Place sequential compression device  Until discontinued         09/14/22 1340     Place sequential compression device  Until discontinued         09/14/22  8552                   Shanika Corral PA-C  Department of Hospital Medicine   [facility] - Emergency Dept

## 2022-09-14 NOTE — ED PROVIDER NOTES
"Encounter Date: 9/14/2022       History     Chief Complaint   Patient presents with    Shortness of Breath     Pt states "I have fluid in my chest". C/o difficulty breathing, nausea, and hematemesis. Extensive medical hx,. Including GEORGE, dialysis (Tues, Thurs, Saturday), and pulmonary edema.      27-year-old female with a history of type 1 diabetes, end-stage renal disease on dialysis and restrictive lung disease presents with a chief complaint of shortness of breath this started at 5:00 a.m. this morning.  Patient said that she slept through to part of her dialysis session yesterday and they were not able to pull office much fluid is normally do.  She says that she could hear the fluid on her lungs last night was woken up this morning with shortness of breath.  She denies any chest pain abdominal pain which she does endorse an episode of vomiting and diarrhea.  She does not ordinarily require oxygen at home.  She says that she does still make urine once today but is not responsive to Lasix.  She is denying any recent illnesses.    Review of patient's allergies indicates:  No Known Allergies  Past Medical History:   Diagnosis Date    Acute kidney injury superimposed on chronic kidney disease 4/7/2021    Anemia     Chronic hypertension with exacerbation during pregnancy in second trimester 11/6/2020    Current regimen (11/6/20):  - Carvedilol 12.5 mg BID - Nifedipine 30 mg daily Baseline CKD + proteinuria    Chronic kidney disease     Depression     Diabetes mellitus     Diabetic retinopathy     Diarrhea     Encounter for blood transfusion     Gastroparesis     Glaucoma     Hepatomegaly 4/29/2021    Hx of psychiatric care     Hyperlipidemia     Hypertension     Nephrotic syndrome     Nephrotic syndrome 3/4/2021    Palpitations     Poor fetal growth affecting management of mother in second trimester 10/15/2020    Restrictive lung disease     Retinal detachment     Severe pre-eclampsia in second trimester 11/6/2020 "     Past Surgical History:   Procedure Laterality Date    AV FISTULA PLACEMENT Left 4/7/2021    Procedure: CREATION, AV FISTULA;  Surgeon: Roberto Ryan MD;  Location: [facility] OR 2ND FLR;  Service: Peripheral Vascular;  Laterality: Left;    COLONOSCOPY N/A 3/16/2022    Procedure: COLONOSCOPY;  Surgeon: Tavo Kwok MD;  Location: [facility] ENDO (4TH FLR);  Service: Endoscopy;  Laterality: N/A;  Questionable history of delayed gastric emptying longstanding diabetes now on eating pre transplant workup for history of nausea vomiting which seems to have improved with dialysis also chronic diarrhea and history of anemia pre transplant workup for kidney transplant. 3    ESOPHAGOGASTRODUODENOSCOPY N/A 3/16/2022    Procedure: EGD (ESOPHAGOGASTRODUODENOSCOPY);  Surgeon: Tavo Kwok MD;  Location: [facility] ENDO (4TH FLR);  Service: Endoscopy;  Laterality: N/A;  Questionable history of delayed gastric emptying longstanding diabetes now on eating pre transplant workup for history of nausea vomiting which seems to have improved with dialysis also chronic diarrhea and history of anemia pre transplant workup for    FISTULOGRAM N/A 8/11/2021    Procedure: Fistulogram;  Surgeon: Roberto Ryan MD;  Location: [facility] CATH LAB;  Service: Cardiology;  Laterality: N/A;    LASER PHOTOCOAGULATION OF RETINA Right 5/31/2022    Procedure: PHOTOCOAGULATION, RETINA, USING LASER;  Surgeon: Maximilian Montaño MD;  Location: [address];  Service: Ophthalmology;  Laterality: Right;    PERCUTANEOUS TRANSLUMINAL ANGIOPLASTY OF ARTERIOVENOUS FISTULA N/A 8/11/2021    Procedure: PTA, AV FISTULA;  Surgeon: Roberto Ryan MD;  Location: [facility] CATH LAB;  Service: Cardiology;  Laterality: N/A;    REMOVAL IMPLANT, POSTERIOR SEGMENT, INTRAOCULAR Right 2/1/2022    Procedure: REMOVAL IMPLANT, POSTERIOR SEGMENT, INTRAOCULAR;  Surgeon: Maximilian Montaño MD;  Location: [address];  Service: Ophthalmology;  Laterality: Right;     REPAIR OF RETINAL DETACHMENT WITH VITRECTOMY Right 1/25/2022    Procedure: REPAIR, RETINAL DETACHMENT, WITH VITRECTOMY, MEMBRANE PEEL, LASER, INJECTION OF GAS VS OIL;  Surgeon: Maximilian Montaño MD;  Location: [facility] OR 1ST FLR;  Service: Ophthalmology;  Laterality: Right;    REVISION OF ARTERIOVENOUS FISTULA Left 12/10/2021    Procedure: REVISION, AV FISTULA with BVT;  Surgeon: Roberto Ryan MD;  Location: [facility] OR 2ND FLR;  Service: Peripheral Vascular;  Laterality: Left;    VITRECTOMY BY PARS PLANA APPROACH Right 2/1/2022    Procedure: VITRECTOMY, PARS PLANA APPROACH;  Surgeon: Maximilian Montaño MD;  Location: [facility] OR 1ST FLR;  Service: Ophthalmology;  Laterality: Right;    VITRECTOMY BY PARS PLANA APPROACH Right 5/31/2022    Procedure: VITRECTOMY, PARS PLANA APPROACH;  Surgeon: Maximilian Montaño MD;  Location: [facility] OR 1ST FLR;  Service: Ophthalmology;  Laterality: Right;     Family History   Problem Relation Age of Onset    Hypertension Mother     Heart disease Father     Diabetes Brother     Celiac disease Neg Hx     Cirrhosis Neg Hx     Colon cancer Neg Hx     Colon polyps Neg Hx     Crohn's disease Neg Hx     Inflammatory bowel disease Neg Hx     Liver cancer Neg Hx     Liver disease Neg Hx     Rectal cancer Neg Hx     Stomach cancer Neg Hx     Ulcerative colitis Neg Hx     Esophageal cancer Neg Hx     Hemochromatosis Neg Hx     Pancreatic cancer Neg Hx     Kidney cancer Neg Hx     Bladder Cancer Neg Hx     Uterine cancer Neg Hx     Ovarian cancer Neg Hx      Social History     Tobacco Use    Smoking status: Never    Smokeless tobacco: Never   Substance Use Topics    Alcohol use: No    Drug use: No     Review of Systems   Constitutional:  Negative for chills and fever.   HENT:  Negative for congestion and sore throat.    Eyes:  Negative for photophobia and visual disturbance.   Respiratory:  Positive for shortness of breath. Negative for cough.    Cardiovascular:  Negative for chest pain,  palpitations and leg swelling.   Gastrointestinal:  Negative for abdominal pain, diarrhea, nausea and vomiting.   Genitourinary:  Positive for decreased urine volume. Negative for flank pain.   Musculoskeletal:  Negative for back pain and neck pain.   Skin:  Negative for pallor and rash.   Neurological:  Negative for weakness, light-headedness and headaches.   Hematological:  Does not bruise/bleed easily.   Psychiatric/Behavioral:  Negative for confusion and dysphoric mood.      Physical Exam     Initial Vitals [09/14/22 0612]   BP Pulse Resp Temp SpO2   (!) 233/107 108 18 98.3 °F (36.8 °C) (!) 88 %      MAP       --         Physical Exam    Constitutional: She appears well-developed and well-nourished.   HENT:   Head: Normocephalic and atraumatic.   Eyes: Pupils are equal, round, and reactive to light.   Neck: Neck supple.   Normal range of motion.  Cardiovascular:  Normal rate, regular rhythm, S1 normal, normal heart sounds and normal pulses.           Pulmonary/Chest: She has rales.   Patient is on 4 L nasal cannula satting 95%.  There is poor air movement overall, patient is not moving much air.  Lung sounds are absent in the bases there is coarse rales in the middle lobes respirations are shallow   Abdominal: Abdomen is soft. Bowel sounds are normal. There is no abdominal tenderness. There is no rebound and no guarding.   Musculoskeletal:         General: No tenderness or edema. Normal range of motion.      Cervical back: Normal range of motion and neck supple.      Comments: There is no lower extremity edema     Neurological: She is alert and oriented to person, place, and time. GCS eye subscore is 4. GCS verbal subscore is 5. GCS motor subscore is 6.   Skin: Skin is warm, dry and intact. Capillary refill takes less than 2 seconds.   Psychiatric: She has a normal mood and affect. Her speech is normal and behavior is normal. Judgment and thought content normal. Cognition and memory are normal.       ED Course    Procedures  Labs Reviewed   CBC W/ AUTO DIFFERENTIAL   COMPREHENSIVE METABOLIC PANEL   TROPONIN I   B-TYPE NATRIURETIC PEPTIDE   ISTAT CHEM8          Imaging Results    None          Medications   carvediloL tablet 25 mg (has no administration in time range)   hydrALAZINE tablet 25 mg (has no administration in time range)   NIFEdipine 24 hr tablet 60 mg (has no administration in time range)     Medical Decision Making:   History:   Old Medical Records: I decided to obtain old medical records.  Old Records Summarized: records from clinic visits and records from previous admission(s).       <> Summary of Records: Prior records reviewed for past medical history and current medications  Initial Assessment:   27-year-old female in mild distress due to shortness of breath.  Patient says that she did not take her blood pressure medications this morning, she is hypertensive to the 200 systolic.  She does not have tachypnea or increased work of breathing, but there is poor air movement overall and coarse rales on exam.  I gave her all of her prescribed oral antihypertensives.    Ddx: CHF exacerbation, Flash pulmonary edema, hypertensive urgency, Pleural effusion  Independently Interpreted Test(s):   I have ordered and independently interpreted X-rays - see prior notes.  I have ordered and independently interpreted EKG Reading(s) - see summary below       <> Summary of EKG Reading(s): Sinus tachycardia 113, all intervals within normal limits, no ST elevations concerning for STEMI  Clinical Tests:   Lab Tests: Reviewed  Radiological Study: Reviewed  Medical Tests: Reviewed  ED Management:  Shortly after my initial examination, the patient began coughing up pink frothy sputum and vomiting, with desaturations down to the 80s. The patient was placed on BiPAP and a nitroglycerin drip was started, after which she significantly improved.    Nephrology was consulted and the patient was given emergent dialysis.    We eventually wean  the patient off of BiPAP and nitroglycerin drip, treating her hypertension with her oral antihypertensives and hydralazine pushes.  The patient was ultimately admitted to Hospital Medicine for observation and blood pressure control.           ED Course as of 09/14/22 1658   Wed Sep 14, 2022   0742 Potassium: 5.1 [BP]      ED Course User Index  [BP] Tesfaye Warner MD                   Clinical Impression:   Final diagnoses:  [I10] Hypertension               Tesfaye Warner MD  Resident  09/14/22 3697

## 2022-09-14 NOTE — SUBJECTIVE & OBJECTIVE
Past Medical History:   Diagnosis Date    Acute kidney injury superimposed on chronic kidney disease 4/7/2021    Anemia     Chronic hypertension with exacerbation during pregnancy in second trimester 11/6/2020    Current regimen (11/6/20):  - Carvedilol 12.5 mg BID - Nifedipine 30 mg daily Baseline CKD + proteinuria    Chronic kidney disease     Depression     Diabetes mellitus     Diabetic retinopathy     Diarrhea     Encounter for blood transfusion     Gastroparesis     Glaucoma     Hepatomegaly 4/29/2021    Hx of psychiatric care     Hyperlipidemia     Hypertension     Nephrotic syndrome     Nephrotic syndrome 3/4/2021    Palpitations     Poor fetal growth affecting management of mother in second trimester 10/15/2020    Restrictive lung disease     Retinal detachment     Severe pre-eclampsia in second trimester 11/6/2020       Past Surgical History:   Procedure Laterality Date    AV FISTULA PLACEMENT Left 4/7/2021    Procedure: CREATION, AV FISTULA;  Surgeon: Roberto Ryan MD;  [facility];  Service: Peripheral Vascular;  Laterality: Left;    COLONOSCOPY N/A 3/16/2022    Procedure: COLONOSCOPY;  Surgeon: Tavo Kwok MD;  [facility]  Service: Endoscopy;  Laterality: N/A;  Questionable history of delayed gastric emptying longstanding diabetes now on eating pre transplant workup for history of nausea vomiting which seems to have improved with dialysis also chronic diarrhea and history of anemia pre transplant workup for kidney transplant. 3    ESOPHAGOGASTRODUODENOSCOPY N/A 3/16/2022    Procedure: EGD (ESOPHAGOGASTRODUODENOSCOPY);  Surgeon: Tavo Kwok MD;  Location: [facility] (4TH FLR);  Service: Endoscopy;  Laterality: N/A;  Questionable history of delayed gastric emptying longstanding diabetes now on eating pre transplant workup for history of nausea vomiting which seems to have improved with dialysis also chronic diarrhea and history of anemia pre  transplant workup for    FISTULOGRAM N/A 8/11/2021    Procedure: Fistulogram;  Surgeon: Roberto Ryan MD;  Location: [facility] CATH LAB;  Service: Cardiology;  Laterality: N/A;    LASER PHOTOCOAGULATION OF RETINA Right 5/31/2022    Procedure: PHOTOCOAGULATION, RETINA, USING LASER;  Surgeon: Maximilian Montaño MD;  Location: [facility] OR 1ST FLR;  Service: Ophthalmology;  Laterality: Right;    PERCUTANEOUS TRANSLUMINAL ANGIOPLASTY OF ARTERIOVENOUS FISTULA N/A 8/11/2021    Procedure: PTA, AV FISTULA;  Surgeon: Roberto Ryan MD;  Location: [facility] CATH LAB;  Service: Cardiology;  Laterality: N/A;    REMOVAL IMPLANT, POSTERIOR SEGMENT, INTRAOCULAR Right 2/1/2022    Procedure: REMOVAL IMPLANT, POSTERIOR SEGMENT, INTRAOCULAR;  Surgeon: Maximilian Montaño MD;  [facility];  Service: Ophthalmology;  Laterality: Right;    REPAIR OF RETINAL DETACHMENT WITH VITRECTOMY Right 1/25/2022    Procedure: REPAIR, RETINAL DETACHMENT, WITH VITRECTOMY, MEMBRANE PEEL, LASER, INJECTION OF GAS VS OIL;  Surgeon: Maximilian Montaño MD;  Location: [facility] OR 1ST FLR;  Service: Ophthalmology;  Laterality: Right;    REVISION OF ARTERIOVENOUS FISTULA Left 12/10/2021    Procedure: REVISION, AV FISTULA with BVT;  Surgeon: Roberto Ryan MD;  Location: [facility] OR 2ND FLR;  Service: Peripheral Vascular;  Laterality: Left;    VITRECTOMY BY PARS PLANA APPROACH Right 2/1/2022    Procedure: VITRECTOMY, PARS PLANA APPROACH;  Surgeon: Maximilian Montaño MD;  Location: [facility] OR 1ST FLR;  Service: Ophthalmology;  Laterality: Right;    VITRECTOMY BY PARS PLANA APPROACH Right 5/31/2022    Procedure: VITRECTOMY, PARS PLANA APPROACH;  Surgeon: Maximilian Montaño MD;  Location: [facility] OR [address] [place] [address];  Service: Ophthalmology;  Laterality: Right;       Review of patient's allergies indicates:  No Known Allergies  Current Facility-Administered Medications   Medication Frequency    0.9%  NaCl infusion Once    hydrALAZINE injection 10 mg ED 1 Time     "nitroGLYCERIN 50 mg in dextrose 5 % 250 mL infusion (TITRATING) Continuous     Current Outpatient Medications   Medication    acetaminophen (TYLENOL) 325 MG tablet    aspirin (ECOTRIN) 81 MG EC tablet    blood sugar diagnostic Strp    calcitRIOL (ROCALTROL) 0.5 MCG Cap    carvediloL (COREG) 25 MG tablet    flash glucose scanning reader (FREESTYLE MARCY 14 DAY READER) Misc    flash glucose sensor (FREESTYLE MARCY 14 DAY SENSOR) Kit    fluticasone propionate (FLONASE) 50 mcg/actuation nasal spray    hydrALAZINE (APRESOLINE) 25 MG tablet    insulin detemir U-100 (LEVEMIR FLEXTOUCH) 100 unit/mL (3 mL) SubQ InPn pen    insulin lispro (HUMALOG KWIKPEN INSULIN) 100 unit/mL pen    lancets Misc    medroxyPROGESTERone (DEPO-PROVERA) 150 mg/mL Syrg    multivit-min/folic acid/biotin (HAIR,SKIN AND NAILS,FA-BIOTIN, ORAL)    NIFEdipine (PROCARDIA-XL) 60 MG (OSM) 24 hr tablet    pen needle, diabetic 32 gauge x 5/32" Ndle    prednisoLONE acetate (PRED FORTE) 1 % DrpS    rosuvastatin (CRESTOR) 10 MG tablet    sevelamer carbonate (RENVELA) 800 mg Tab    traZODone (DESYREL) 50 MG tablet    venlafaxine (EFFEXOR XR) 37.5 MG 24 hr capsule     Facility-Administered Medications Ordered in Other Encounters   Medication Frequency    0.9%  NaCl infusion Continuous     Family History       Problem Relation (Age of Onset)    Diabetes Brother    Heart disease Father    Hypertension Mother          Tobacco Use    Smoking status: Never    Smokeless tobacco: Never   Substance and Sexual Activity    Alcohol use: No    Drug use: No    Sexual activity: Not Currently     Partners: Male     Comment: monogamous     Review of Systems   Constitutional: Negative.    HENT: Negative.     Eyes: Negative.    Respiratory:  Positive for shortness of breath.    Cardiovascular: Negative.    Gastrointestinal:  Positive for nausea and vomiting.   Genitourinary:  Positive for decreased urine volume (minimal UOP at baseline).   Musculoskeletal: Negative.    Skin: " Negative.    Neurological: Negative.    Psychiatric/Behavioral: Negative.     Objective:     Vital Signs (Most Recent):  Temp: 98.3 °F (36.8 °C) (09/14/22 0612)  Pulse: 110 (09/14/22 1200)  Resp: 20 (09/14/22 1200)  BP: (!) 212/102 (09/14/22 1200)  SpO2: 96 % (09/14/22 1200)  O2 Device (Oxygen Therapy): nasal cannula (09/14/22 1200)   Vital Signs (24h Range):  Temp:  [98.3 °F (36.8 °C)] 98.3 °F (36.8 °C)  Pulse:  [108-118] 110  Resp:  [16-33] 20  SpO2:  [88 %-100 %] 96 %  BP: (163-256)/() 212/102     Weight: 61 kg (134 lb 7.7 oz) (09/14/22 0612)  Body mass index is 23.08 kg/m².  Body surface area is 1.66 meters squared.    No intake/output data recorded.    Physical Exam  Constitutional:       General: She is in acute distress.      Appearance: She is ill-appearing.   HENT:      Head: Normocephalic and atraumatic.      Nose: Nose normal.      Mouth/Throat:      Mouth: Mucous membranes are moist.      Pharynx: Oropharynx is clear.   Eyes:      Conjunctiva/sclera: Conjunctivae normal.   Cardiovascular:      Rate and Rhythm: Regular rhythm. Tachycardia present.      Comments: LUE AVF with +thrill and + bruit   Pulmonary:      Breath sounds: Rales present.      Comments: BiPAP  Abdominal:      General: Abdomen is flat.      Palpations: Abdomen is soft.   Musculoskeletal:         General: Normal range of motion.      Cervical back: Normal range of motion and neck supple.   Skin:     General: Skin is warm and dry.   Neurological:      General: No focal deficit present.      Mental Status: She is alert and oriented to person, place, and time.   Psychiatric:         Mood and Affect: Mood normal.         Behavior: Behavior normal.       Significant Labs:  CBC:   Recent Labs   Lab 09/14/22  0650   WBC 8.03   RBC 3.39*   HGB 11.1*   HCT 33.2*      MCV 98   MCH 32.7*   MCHC 33.4     CMP:   Recent Labs   Lab 09/14/22  0650   *   CALCIUM 8.5*   ALBUMIN 3.4*   PROT 7.8   *   K 5.1   CO2 27   CL 93*   BUN  37*   CREATININE 7.5*   ALKPHOS 103   ALT 13   AST 30   BILITOT 0.7     All labs within the past 24 hours have been reviewed.

## 2022-09-14 NOTE — PROGRESS NOTES
HD completed, blood returned and needles pulled . Post bleeding time < 5 minutes. Net uf removed 4300ml . Post B/P 187/87 pulse 96, resp 20, o2 sat on 2 liters o2 nasal cannula .

## 2022-09-14 NOTE — HPI
Isabela Read is a 27-year-old female with a history of type 1 diabetes, ESRD on HD (T/Th/Sat), and restrictive lung disease who presents acute onset of shortness of breath. Patient reports shortened HD session yesterday (9/13) with 1.9 L fluid removal (usual 2.8L) due to oversleeping and issues with machine. Patient experienced acute onset of orthopnea, SOB, nausea, vomiting, and hemoptysis so presented to ED. She reports history of hemoptysis in May associated with hypervolemia. She is on depo-provera for birth control, no recent surgeries, trips. She denies any leg swelling or chest pain. She also endorses difficulty with blood pressure control. She was taken off hydralazine ~month ago due to hypotension, but over past two weeks blood pressure has been uncontrolled. She restarted hydralazine 50 mg BID X 1 week but continues to have elevated BP readings (SBP ~180s). Patient denies fever, chills, diaphoresis, abdominal pain, focal numbness, weakness, HA. She makes a small amount of urine.     In ED, patient tachycardic to 121, /145, 88% on RA. Patient placed on BIPAP and given IV hydralazine, home coreg, nifedipine, and started on nitro gtt. CXR with pulmonary edema. Underwent HD with 3.5 L removed, able to wean to 2L and d/c nitro gtt. BP remained uncontrolled and given additional IV hydralazine with improvement to 170/81. D-dimer positive. Discussed with nephrology, ok for CTA, ordered. Patient admitted to observation for HTN urgency.

## 2022-09-14 NOTE — SUBJECTIVE & OBJECTIVE
Interval History:   Patient feeling much better post HD.  She has suffered with recurrent pulmonary edema multiple times.  She makes very little urine.      Review of Systems   Constitutional:  Negative for activity change and appetite change.   Respiratory:  Positive for shortness of breath. Negative for cough.    Cardiovascular:  Negative for chest pain and leg swelling.   Gastrointestinal:  Negative for abdominal pain, nausea and vomiting.   Musculoskeletal:  Negative for joint swelling and myalgias.   Skin:  Negative for rash.   Neurological:  Negative for weakness, light-headedness and headaches.   Psychiatric/Behavioral:  Negative for decreased concentration and sleep disturbance.    Objective:     Vital Signs (Most Recent):  Temp: 98.7 °F (37.1 °C) (09/14/22 1546)  Pulse: 110 (09/14/22 1546)  Resp: 18 (09/14/22 1546)  BP: (!) 165/74 (09/14/22 1546)  SpO2: 95 % (09/14/22 1546)   Vital Signs (24h Range):  Temp:  [98.3 °F (36.8 °C)-98.7 °F (37.1 °C)] 98.7 °F (37.1 °C)  Pulse:  [108-121] 110  Resp:  [16-33] 18  SpO2:  [88 %-100 %] 95 %  BP: (141-256)/() 165/74     Weight: 61 kg (134 lb 7.7 oz)  Body mass index is 23.08 kg/m².    Intake/Output Summary (Last 24 hours) at 9/14/2022 1757  Last data filed at 9/14/2022 1223  Gross per 24 hour   Intake 250 ml   Output 4800 ml   Net -4550 ml      Physical Exam  Constitutional:       General: She is awake.      Appearance: Normal appearance. She is well-developed.   HENT:      Head: Normocephalic and atraumatic.   Eyes:      General: Lids are normal. No scleral icterus.     Conjunctiva/sclera: Conjunctivae normal.   Cardiovascular:      Rate and Rhythm: Normal rate and regular rhythm.      Heart sounds: Normal heart sounds. No murmur heard.  Pulmonary:      Effort: Pulmonary effort is normal.      Breath sounds: Normal breath sounds.   Abdominal:      General: Bowel sounds are normal.      Palpations: Abdomen is soft.   Musculoskeletal:         General: Normal range  of motion.      Right lower leg: No edema.      Left lower leg: No edema.   Skin:     General: Skin is warm.   Neurological:      General: No focal deficit present.      Mental Status: She is alert and oriented to person, place, and time. Mental status is at baseline.   Psychiatric:         Attention and Perception: Attention normal.         Mood and Affect: Mood normal.       Significant Labs: All pertinent labs within the past 24 hours have been reviewed.  BMP:   Recent Labs   Lab 09/14/22  0650   *   *   K 5.1   CL 93*   CO2 27   BUN 37*   CREATININE 7.5*   CALCIUM 8.5*     CBC:   Recent Labs   Lab 09/14/22  0650   WBC 8.03   HGB 11.1*   HCT 33.2*        CMP:   Recent Labs   Lab 09/14/22  0650   *   K 5.1   CL 93*   CO2 27   *   BUN 37*   CREATININE 7.5*   CALCIUM 8.5*   PROT 7.8   ALBUMIN 3.4*   BILITOT 0.7   ALKPHOS 103   AST 30   ALT 13   ANIONGAP 13     Cardiac Markers:   Recent Labs   Lab 09/14/22  0650   BNP 1,768*       Significant Imaging: I have reviewed all pertinent imaging results/findings within the past 24 hours.

## 2022-09-14 NOTE — ED TRIAGE NOTES
"Isabela Read, a 27 y.o. female presents to the ED w/ complaint of "I felt the fluid" around last night. Pt had dialysis yesterday and reports "barely took much fluid off." Pt endorses SOB and vomiting.     Triage note:  Chief Complaint   Patient presents with    Shortness of Breath     Pt states "I have fluid in my chest". C/o difficulty breathing, nausea, and hematemesis. Extensive medical hx,. Including GEORGE, dialysis (Tues, Thurs, Saturday), and pulmonary edema.      Review of patient's allergies indicates:  No Known Allergies  Past Medical History:   Diagnosis Date    Acute kidney injury superimposed on chronic kidney disease 4/7/2021    Anemia     Chronic hypertension with exacerbation during pregnancy in second trimester 11/6/2020    Current regimen (11/6/20):  - Carvedilol 12.5 mg BID - Nifedipine 30 mg daily Baseline CKD + proteinuria    Chronic kidney disease     Depression     Diabetes mellitus     Diabetic retinopathy     Diarrhea     Encounter for blood transfusion     Gastroparesis     Glaucoma     Hepatomegaly 4/29/2021    Hx of psychiatric care     Hyperlipidemia     Hypertension     Nephrotic syndrome     Nephrotic syndrome 3/4/2021    Palpitations     Poor fetal growth affecting management of mother in second trimester 10/15/2020    Restrictive lung disease     Retinal detachment     Severe pre-eclampsia in second trimester 11/6/2020      "

## 2022-09-14 NOTE — HOSPITAL COURSE
Patient on RA after HD complete.  CTA of chest with motion artifact vs unlikely filling defect. Lower ext venous US negative for DVT - overall low suspicion for PTE + rapid improvement with iHD, therefore not treated with AC. BP with significant improvement after iHD x2 while admitted. Discussed at length with patient re BP, specifically that her significant elevation is likely due to her needing iHD. She will hold the hydralazine for now given reassuring pressures, follow up in iHD clinic. Should she develop HTN before that time she will call renal. She voiced understanding and agreement with plan. Discharged with strict return precautions.

## 2022-09-14 NOTE — ED NOTES
Nitro drip titrated down from 200mcg to 100mcg by verbal order of MD Avery, /107; nitro order locked by pharm, will document titration when able.

## 2022-09-14 NOTE — ED NOTES
Pt updated on poc, no further questions at this time. Pt resting comfprtably on cart, states feeling better with o2, no distress noted at this time.

## 2022-09-14 NOTE — PROGRESS NOTES
[facility] - Emergency Dept  Hospital Medicine  Progress Note    Patient Name: Isabela Read  MRN: [id number]  Patient Class: OP- Observation   Admission Date: 9/14/2022  Length of Stay: 0 days  Attending Physician: Kassidy Alexander MD  Primary Care Provider: Tavo Bhatia MD        Subjective:     Principal Problem:Flash pulmonary edema        HPI:  Isabela Read is a 27-year-old female with a history of type 1 diabetes, ESRD on HD (T/Th/Sat), and restrictive lung disease who presents acute onset of shortness of breath. Patient reports shortened HD session yesterday (9/13) with 1.9 L fluid removal (usual 2.8L) due to oversleeping and issues with machine. Patient experienced acute onset of orthopnea, SOB, nausea, vomiting, and hemoptysis so presented to ED. She reports history of hemoptysis in May associated with hypervolemia. She is on depo-provera for birth control, no recent surgeries, trips. She denies any leg swelling or chest pain. She also endorses difficulty with blood pressure control. She was taken off hydralazine ~month ago due to hypotension, but over past two weeks blood pressure has been uncontrolled. She restarted hydralazine 50 mg BID X 1 week but continues to have elevated BP readings (SBP ~180s). Patient denies fever, chills, diaphoresis, abdominal pain, focal numbness, weakness, HA. She makes a small amount of urine.     In ED, patient tachycardic to 121, /145, 88% on RA. Patient placed on BIPAP and given IV hydralazine, home coreg, nifedipine, and started on nitro gtt. CXR with pulmonary edema. Underwent HD with 3.5 L removed, able to wean to 2L and d/c nitro gtt. BP remained uncontrolled and given additional IV hydralazine with improvement to 170/81. D-dimer positive. Discussed with nephrology, ok for CTA, ordered. Patient admitted to observation for HTN urgency.       Overview/Hospital Course:  Patient on RA after HD complete.  CTA of chest demonstrated   a questionable  pulmonary arterial filling defect within a segmental branch to the left upper lobe versus artifact from motion and volume averaging.  Motion artifact/volume averaging favored.    2. Multifocal patchy ground-glass attenuation throughout the pulmonary parenchyma concerning for multifocal infectious or inflammatory process.  There is suspected superimposed pulmonary edema.   (patchy ground glass opacities have been seeing in various degrees in the patient's previous CT scans).  Her chart says that she has interstitial lung dis but the patient claims she saw a pulmonologist and was told lungs normal.  Abnormal imaging and SOB are from recurrent pulmonary edema.  She was recently approved and placed on transplant list.     Lower ext venous US has been ordered but unless positive for DVT, would not treat with AC.  Plan is to work on patient's BP control and most likely DC in AM.       Interval History:   Patient feeling much better post HD.  She has suffered with recurrent pulmonary edema multiple times.  She makes very little urine.      Review of Systems   Constitutional:  Negative for activity change and appetite change.   Respiratory:  Positive for shortness of breath. Negative for cough.    Cardiovascular:  Negative for chest pain and leg swelling.   Gastrointestinal:  Negative for abdominal pain, nausea and vomiting.   Musculoskeletal:  Negative for joint swelling and myalgias.   Skin:  Negative for rash.   Neurological:  Negative for weakness, light-headedness and headaches.   Psychiatric/Behavioral:  Negative for decreased concentration and sleep disturbance.    Objective:     Vital Signs (Most Recent):  Temp: 98.7 °F (37.1 °C) (09/14/22 1546)  Pulse: 110 (09/14/22 1546)  Resp: 18 (09/14/22 1546)  BP: (!) 165/74 (09/14/22 1546)  SpO2: 95 % (09/14/22 1546)   Vital Signs (24h Range):  Temp:  [98.3 °F (36.8 °C)-98.7 °F (37.1 °C)] 98.7 °F (37.1 °C)  Pulse:  [108-121] 110  Resp:  [16-33] 18  SpO2:  [88 %-100 %] 95  %  BP: (141-256)/() 165/74     Weight: 61 kg (134 lb 7.7 oz)  Body mass index is 23.08 kg/m².    Intake/Output Summary (Last 24 hours) at 9/14/2022 1757  Last data filed at 9/14/2022 1223  Gross per 24 hour   Intake 250 ml   Output 4800 ml   Net -4550 ml      Physical Exam  Constitutional:       General: She is awake.      Appearance: Normal appearance. She is well-developed.   HENT:      Head: Normocephalic and atraumatic.   Eyes:      General: Lids are normal. No scleral icterus.     Conjunctiva/sclera: Conjunctivae normal.   Cardiovascular:      Rate and Rhythm: Normal rate and regular rhythm.      Heart sounds: Normal heart sounds. No murmur heard.  Pulmonary:      Effort: Pulmonary effort is normal.      Breath sounds: Normal breath sounds.   Abdominal:      General: Bowel sounds are normal.      Palpations: Abdomen is soft.   Musculoskeletal:         General: Normal range of motion.      Right lower leg: No edema.      Left lower leg: No edema.   Skin:     General: Skin is warm.   Neurological:      General: No focal deficit present.      Mental Status: She is alert and oriented to person, place, and time. Mental status is at baseline.   Psychiatric:         Attention and Perception: Attention normal.         Mood and Affect: Mood normal.       Significant Labs: All pertinent labs within the past 24 hours have been reviewed.  BMP:   Recent Labs   Lab 09/14/22  0650   *   *   K 5.1   CL 93*   CO2 27   BUN 37*   CREATININE 7.5*   CALCIUM 8.5*     CBC:   Recent Labs   Lab 09/14/22  0650   WBC 8.03   HGB 11.1*   HCT 33.2*        CMP:   Recent Labs   Lab 09/14/22  0650   *   K 5.1   CL 93*   CO2 27   *   BUN 37*   CREATININE 7.5*   CALCIUM 8.5*   PROT 7.8   ALBUMIN 3.4*   BILITOT 0.7   ALKPHOS 103   AST 30   ALT 13   ANIONGAP 13     Cardiac Markers:   Recent Labs   Lab 09/14/22  0650   BNP 1,768*       Significant Imaging: I have reviewed all pertinent imaging results/findings  within the past 24 hours.      Assessment/Plan:      * Flash pulmonary edema  - blood pressure control  - emergent HD      Renovascular hypertension  The patient has had issues with labile blood pressure and low blood pressure during HD and afterwards.  However, at other times, her BP will be significantly elevated.   - currently on carvedilol, hydralazine, nifedipine  - titrate blood pressure medications to keep SBP<170      Abnormal CT scan of lung  - questionable pulmonary arterial filling defect. Motion artifact/volume averaging favored.    - follow up lower extremity ultrasound   -  Multifocal patchy ground-glass attenuation seen on previous CT scans.  Patient was told that this was related to her pulmonary edema.  She follows up with pulm and was told that she did not have interstitial lung disease.       Hypertensive chronic kidney disease with stage 5 chronic kidney disease or end stage renal disease  - ESRD on HD  LUE AV fistula    - hemodialysis started on 5//2021.   advanced kidney disease secondary to diabetic nephropathy and HTN.   - patient is on kidney/pancreas transplant list      Sinus tachycardia  - sinus tachycardia with rates 108-121, hemoptysis, on depo-provera  - EKG with sinus tachycardia  - D-dimer positive, stat CTA pending   - discussed with nephrology, cleared for contrast  - TSH  - afebrile without leukocytosis, CXR with flash pulmonary edema, unable to rule out underlying infection, however SOB resolved with HD- do not suspect tachycardia a sign of sepsis  - tele    Chronic kidney disease-mineral and bone disorder  - trend phos levels  - continue home calcitriol    ESRD on hemodialysis  - nephrology consulted, see above      (HFpEF) heart failure with preserved ejection fraction  - volume removal via HD  Results for orders placed during the hospital encounter of 08/05/22    Echo    Interpretation Summary  · The left ventricle is normal in size with concentric remodeling and normal  systolic function. The estimated ejection fraction is 55%.  · Normal right ventricular size with normal right ventricular systolic function.  · Indeterminate left ventricular diastolic function.  · Mild left atrial enlargement.  · Normal central venous pressure (3 mmHg).        Acute and chronic respiratory failure with hypoxia  Flash pulmonary edema  ESRD on HD  Patient with Hypoxic Respiratory failure which is Acute.  she is not on home oxygen. Supplemental oxygen was provided and noted- Oxygen Concentration (%):  [50] 50.   Signs/symptoms of respiratory failure include- tachypnea and wheezing. Contributing diagnoses includes - flash pulmonary edema Labs and images were reviewed. Patient Has not had a recent ABG. Will treat underlying causes and adjust management of respiratory failure as follows-    - in setting of incomplete HD session on 9/13 (on HD TThSat)  - placed on BIPAP on admit, currently on 1L, wean as tolerated  - BNP 1768, CXR with pulmonary edema  - Nephrology consulted, underwent HD with 3.5L removal with resolution of SOB  - renal diet, 1.5 L fluid restriction    Anemia due to chronic kidney disease, on chronic dialysis  - Hgb 11.1, at baseline  - monitor    Hypertensive urgency  HTN CKD/ Renovascular HTN  Patient has a current diagnosis of hypertensive urgency (without evidence of end organ damage) which is controlled.  Latest blood pressure and vitals reviewed-   Temp:  [98.3 °F (36.8 °C)]   Pulse:  [108-121]   Resp:  [16-33]   BP: (163-256)/()   SpO2:  [88 %-100 %] .   Patient currently off IV antihypertensives.   Home meds for hypertension were reviewed and noted below.   Hypertension Medications             carvediloL (COREG) 25 MG tablet Take 25 mg by mouth 2 (two) times daily with meals.    hydrALAZINE (APRESOLINE) 25 MG tablet Take 2 tablets (50 mg total) by mouth every 8 (eight) hours.    NIFEdipine (PROCARDIA-XL) 60 MG (OSM) 24 hr tablet Take 60 mg by mouth 2 (two) times a day.         - patient with labile BP, taken off hydralazine ~ 1 month ago for hypotension, restarted at 50 mg BID X 1 week without significant improvement  - s/p nitro gtt, IV hydralazine X2 in ED  - /67--> will avoid further reduction at this time   - Will aim for controlled BP reduction by medications noted above. Monitor and mitigate end organ damage as indicated.  - cardiac diet     Mixed hyperlipidemia  - continue statin    Type 1 diabetes mellitus with end-stage renal disease (ESRD)  Patient's FSGs are controlled on current medication regimen.  Last A1c reviewed-   Lab Results   Component Value Date    HGBA1C 8.3 (H) 05/08/2022     Most recent fingerstick glucose reviewed-   Recent Labs   Lab 09/14/22  0938   POCTGLUCOSE 294*     Current correctional scale  Low  Maintain anti-hyperglycemic dose as follows-   Antihyperglycemics (From admission, onward)    Start     Stop Route Frequency Ordered    09/14/22 1645  insulin aspart U-100 pen 10 Units         -- SubQ 3 times daily with meals 09/14/22 1449    09/14/22 1600  insulin detemir U-100 pen 11 Units         -- SubQ 2 times daily 09/14/22 1449    09/14/22 1437  insulin aspart U-100 pen 0-5 Units         -- SubQ Before meals & nightly PRN 09/14/22 1340        Hold Oral hypoglycemics while patient is in the hospital.  - diabetic diet      VTE Risk Mitigation (From admission, onward)         Ordered     heparin (porcine) injection 5,000 Units  Every 8 hours         09/14/22 1340     IP VTE HIGH RISK PATIENT  Once         09/14/22 1340     Place sequential compression device  Until discontinued         09/14/22 1340     Place sequential compression device  Until discontinued         09/14/22 1340                Discharge Planning   TITA:      Code Status: Full Code   Is the patient medically ready for discharge?:     Reason for patient still in hospital (select all that apply): Treatment                     Kassidy Alexander MD  Department of Hospital Medicine   [facility] Emergency Dept

## 2022-09-15 VITALS
HEART RATE: 92 BPM | TEMPERATURE: 98 F | BODY MASS INDEX: 22.96 KG/M2 | RESPIRATION RATE: 17 BRPM | WEIGHT: 134.5 LBS | HEIGHT: 64 IN | DIASTOLIC BLOOD PRESSURE: 60 MMHG | SYSTOLIC BLOOD PRESSURE: 112 MMHG | OXYGEN SATURATION: 100 %

## 2022-09-15 LAB
ANION GAP SERPL CALC-SCNC: 14 MMOL/L (ref 8–16)
BASOPHILS # BLD AUTO: 0.02 K/UL (ref 0–0.2)
BASOPHILS NFR BLD: 0.3 % (ref 0–1.9)
BUN SERPL-MCNC: 28 MG/DL (ref 6–20)
CALCIUM SERPL-MCNC: 8.9 MG/DL (ref 8.7–10.5)
CHLORIDE SERPL-SCNC: 97 MMOL/L (ref 95–110)
CO2 SERPL-SCNC: 21 MMOL/L (ref 23–29)
CREAT SERPL-MCNC: 6.7 MG/DL (ref 0.5–1.4)
DIFFERENTIAL METHOD: ABNORMAL
EOSINOPHIL # BLD AUTO: 0.1 K/UL (ref 0–0.5)
EOSINOPHIL NFR BLD: 1.3 % (ref 0–8)
ERYTHROCYTE [DISTWIDTH] IN BLOOD BY AUTOMATED COUNT: 19.8 % (ref 11.5–14.5)
EST. GFR  (NO RACE VARIABLE): 8.1 ML/MIN/1.73 M^2
ESTIMATED AVG GLUCOSE: 235 MG/DL (ref 68–131)
GLUCOSE SERPL-MCNC: 444 MG/DL (ref 70–110)
HBA1C MFR BLD: 9.8 % (ref 4–5.6)
HCT VFR BLD AUTO: 34.2 % (ref 37–48.5)
HGB BLD-MCNC: 11 G/DL (ref 12–16)
IMM GRANULOCYTES # BLD AUTO: 0.01 K/UL (ref 0–0.04)
IMM GRANULOCYTES NFR BLD AUTO: 0.2 % (ref 0–0.5)
LYMPHOCYTES # BLD AUTO: 1.4 K/UL (ref 1–4.8)
LYMPHOCYTES NFR BLD: 22.7 % (ref 18–48)
MAGNESIUM SERPL-MCNC: 2.3 MG/DL (ref 1.6–2.6)
MCH RBC QN AUTO: 32.7 PG (ref 27–31)
MCHC RBC AUTO-ENTMCNC: 32.2 G/DL (ref 32–36)
MCV RBC AUTO: 102 FL (ref 82–98)
MONOCYTES # BLD AUTO: 0.6 K/UL (ref 0.3–1)
MONOCYTES NFR BLD: 10.2 % (ref 4–15)
NEUTROPHILS # BLD AUTO: 3.9 K/UL (ref 1.8–7.7)
NEUTROPHILS NFR BLD: 65.3 % (ref 38–73)
NRBC BLD-RTO: 0 /100 WBC
PHOSPHATE SERPL-MCNC: 5.1 MG/DL (ref 2.7–4.5)
PLATELET # BLD AUTO: 276 K/UL (ref 150–450)
PMV BLD AUTO: 12.1 FL (ref 9.2–12.9)
POCT GLUCOSE: 114 MG/DL (ref 70–110)
POCT GLUCOSE: 220 MG/DL (ref 70–110)
POCT GLUCOSE: 343 MG/DL (ref 70–110)
POCT GLUCOSE: 484 MG/DL (ref 70–110)
POTASSIUM SERPL-SCNC: 5.1 MMOL/L (ref 3.5–5.1)
RBC # BLD AUTO: 3.36 M/UL (ref 4–5.4)
SODIUM SERPL-SCNC: 132 MMOL/L (ref 136–145)
TSH SERPL DL<=0.005 MIU/L-ACNC: 2.33 UIU/ML (ref 0.4–4)
WBC # BLD AUTO: 5.96 K/UL (ref 3.9–12.7)

## 2022-09-15 PROCEDURE — 99214 PR OFFICE/OUTPT VISIT, EST, LEVL IV, 30-39 MIN: ICD-10-PCS | Mod: NTX,,, | Performed by: NURSE PRACTITIONER

## 2022-09-15 PROCEDURE — 80048 BASIC METABOLIC PNL TOTAL CA: CPT | Mod: NTX | Performed by: PHYSICIAN ASSISTANT

## 2022-09-15 PROCEDURE — 25000003 PHARM REV CODE 250: Mod: NTX | Performed by: NURSE PRACTITIONER

## 2022-09-15 PROCEDURE — 84443 ASSAY THYROID STIM HORMONE: CPT | Mod: NTX | Performed by: PHYSICIAN ASSISTANT

## 2022-09-15 PROCEDURE — 99217 PR OBSERVATION CARE DISCHARGE: CPT | Mod: NTX,,, | Performed by: STUDENT IN AN ORGANIZED HEALTH CARE EDUCATION/TRAINING PROGRAM

## 2022-09-15 PROCEDURE — 63600175 PHARM REV CODE 636 W HCPCS: Mod: GZ,NTX | Performed by: PHYSICIAN ASSISTANT

## 2022-09-15 PROCEDURE — 94761 N-INVAS EAR/PLS OXIMETRY MLT: CPT | Mod: NTX

## 2022-09-15 PROCEDURE — 36415 COLL VENOUS BLD VENIPUNCTURE: CPT | Mod: NTX | Performed by: PHYSICIAN ASSISTANT

## 2022-09-15 PROCEDURE — 90935 PR HEMODIALYSIS, ONE EVALUATION: ICD-10-PCS | Mod: NTX,,, | Performed by: NURSE PRACTITIONER

## 2022-09-15 PROCEDURE — 90935 HEMODIALYSIS ONE EVALUATION: CPT | Mod: NTX,,, | Performed by: NURSE PRACTITIONER

## 2022-09-15 PROCEDURE — 85025 COMPLETE CBC W/AUTO DIFF WBC: CPT | Mod: NTX | Performed by: PHYSICIAN ASSISTANT

## 2022-09-15 PROCEDURE — 83735 ASSAY OF MAGNESIUM: CPT | Mod: NTX | Performed by: PHYSICIAN ASSISTANT

## 2022-09-15 PROCEDURE — G0257 UNSCHED DIALYSIS ESRD PT HOS: HCPCS | Mod: NTX

## 2022-09-15 PROCEDURE — 99214 OFFICE O/P EST MOD 30 MIN: CPT | Mod: NTX,,, | Performed by: NURSE PRACTITIONER

## 2022-09-15 PROCEDURE — 96372 THER/PROPH/DIAG INJ SC/IM: CPT | Mod: 59,NTX | Performed by: PHYSICIAN ASSISTANT

## 2022-09-15 PROCEDURE — G0378 HOSPITAL OBSERVATION PER HR: HCPCS | Mod: NTX

## 2022-09-15 PROCEDURE — 84100 ASSAY OF PHOSPHORUS: CPT | Mod: NTX | Performed by: PHYSICIAN ASSISTANT

## 2022-09-15 PROCEDURE — 25000003 PHARM REV CODE 250: Mod: NTX | Performed by: PHYSICIAN ASSISTANT

## 2022-09-15 PROCEDURE — 99217 PR OBSERVATION CARE DISCHARGE: ICD-10-PCS | Mod: NTX,,, | Performed by: STUDENT IN AN ORGANIZED HEALTH CARE EDUCATION/TRAINING PROGRAM

## 2022-09-15 PROCEDURE — 83036 HEMOGLOBIN GLYCOSYLATED A1C: CPT | Mod: NTX | Performed by: PHYSICIAN ASSISTANT

## 2022-09-15 RX ORDER — HYDRALAZINE HYDROCHLORIDE 25 MG/1
25 TABLET, FILM COATED ORAL EVERY 8 HOURS
Qty: 90 TABLET | Refills: 3 | Status: ON HOLD | OUTPATIENT
Start: 2022-09-15 | End: 2022-11-08 | Stop reason: HOSPADM

## 2022-09-15 RX ORDER — SODIUM CHLORIDE 9 MG/ML
INJECTION, SOLUTION INTRAVENOUS ONCE
Status: COMPLETED | OUTPATIENT
Start: 2022-09-15 | End: 2022-09-15

## 2022-09-15 RX ADMIN — SEVELAMER CARBONATE 800 MG: 800 TABLET, FILM COATED ORAL at 07:09

## 2022-09-15 RX ADMIN — INSULIN ASPART 10 UNITS: 100 INJECTION, SOLUTION INTRAVENOUS; SUBCUTANEOUS at 07:09

## 2022-09-15 RX ADMIN — SODIUM CHLORIDE: 0.9 INJECTION, SOLUTION INTRAVENOUS at 09:09

## 2022-09-15 RX ADMIN — CALCITRIOL CAPSULES 0.25 MCG 0.5 MCG: 0.25 CAPSULE ORAL at 07:09

## 2022-09-15 RX ADMIN — INSULIN DETEMIR 11 UNITS: 100 INJECTION, SOLUTION SUBCUTANEOUS at 07:09

## 2022-09-15 RX ADMIN — INSULIN ASPART 5 UNITS: 100 INJECTION, SOLUTION INTRAVENOUS; SUBCUTANEOUS at 07:09

## 2022-09-15 RX ADMIN — CARVEDILOL 25 MG: 25 TABLET, FILM COATED ORAL at 07:09

## 2022-09-15 RX ADMIN — INSULIN ASPART 10 UNITS: 100 INJECTION, SOLUTION INTRAVENOUS; SUBCUTANEOUS at 11:09

## 2022-09-15 RX ADMIN — VENLAFAXINE HYDROCHLORIDE 37.5 MG: 37.5 CAPSULE, EXTENDED RELEASE ORAL at 07:09

## 2022-09-15 RX ADMIN — HYDRALAZINE HYDROCHLORIDE 50 MG: 50 TABLET ORAL at 06:09

## 2022-09-15 RX ADMIN — HEPARIN SODIUM 5000 UNITS: 5000 INJECTION INTRAVENOUS; SUBCUTANEOUS at 06:09

## 2022-09-15 RX ADMIN — INSULIN ASPART 2 UNITS: 100 INJECTION, SOLUTION INTRAVENOUS; SUBCUTANEOUS at 12:09

## 2022-09-15 RX ADMIN — ASPIRIN 81 MG: 81 TABLET, COATED ORAL at 07:09

## 2022-09-15 RX ADMIN — NIFEDIPINE 60 MG: 30 TABLET, FILM COATED, EXTENDED RELEASE ORAL at 07:09

## 2022-09-15 NOTE — PLAN OF CARE
[facility] - Observation  Discharge Final Note    Primary Care Provider: Tavo Bhatia MD    Expected Discharge Date: 9/15/2022    Final Discharge Note (most recent)       Final Note - 09/15/22 1527          Final Note    Assessment Type Final Discharge Note     Anticipated Discharge Disposition Home or Self Care     Hospital Resources/Appts/Education Provided Appointments scheduled and added to AVS        Post-Acute Status    Post-Acute Authorization Other     Other Status No Post-Acute Service Needs   pt will return to her HD clinic on Sat and resume her regualr schedule    Discharge Delays None known at this time                   Future Appointments   Date Time Provider Department Center   9/19/2022 11:00 AM Maximilian Montaño MD [facility]   9/21/2022 11:30 AM Luciana Mayo MD [facility]   9/22/2022 10:00 AM MARCE Arrieta II, MD [facility]     Kirsty Genao LCSW  PRN

## 2022-09-15 NOTE — HPI
Reason for Consult: Management of T1DM, Hyperglycemia      Diabetes diagnosis year: at 8 years of age     Home Diabetes Medications:  (Per Dr. Mayo with [facility])  Levemir 10 units in the am and 12 units q HS, Humalog 10 units with meals + SSI )ISF 1:35)     How often checking glucose at home?  Freestyle Jayda    BG readings on regimen: variable per patient (100-200s)   Hypoglycemia on the regimen?  Infrequent  Missed doses on regimen?  No     Diabetes Complications include:     Hyperglycemia, Hypoglycemia , gastroparesis, Diabetic nephropathy, and Diabetic chronic kidney disease          Complicating diabetes co morbidities:   HTN, ESRD on dialysis         HPI: Isabela Read is a 27-year-old female with a history of type 1 diabetes, ESRD on HD (T/Th/Sat), and restrictive lung disease who presents with a chief complaint of shortness of breath. Patient last dialyzed yesterday (9/13/22), but explained that she did not receive a full treatment due to arriving late to dialysis. Since admission patient did not receive basal insulin until 8 AM this morning leading to excursions overnight. Endocrine consulted to provide recommendations for discharge.     Lab Results   Component Value Date    HGBA1C 8.3 (H) 05/08/2022

## 2022-09-15 NOTE — PLAN OF CARE
Problem: Device-Related Complication Risk (Hemodialysis)  Goal: Safe, Effective Therapy Delivery  Outcome: Ongoing, Progressing     Problem: Hemodynamic Instability (Hemodialysis)  Goal: Effective Tissue Perfusion  Outcome: Ongoing, Progressing     Problem: Infection (Hemodialysis)  Goal: Absence of Infection Signs and Symptoms  Outcome: Ongoing, Progressing     Problem: Adult Inpatient Plan of Care  Goal: Plan of Care Review  Outcome: Ongoing, Progressing  Goal: Optimal Comfort and Wellbeing  Outcome: Ongoing, Progressing

## 2022-09-15 NOTE — PLAN OF CARE
Problem: Device-Related Complication Risk (Hemodialysis)  Goal: Safe, Effective Therapy Delivery  Outcome: Met     Problem: Hemodynamic Instability (Hemodialysis)  Goal: Effective Tissue Perfusion  Outcome: Met     Problem: Infection (Hemodialysis)  Goal: Absence of Infection Signs and Symptoms  Outcome: Met     Problem: Adult Inpatient Plan of Care  Goal: Plan of Care Review  Outcome: Met  Goal: Patient-Specific Goal (Individualized)  Outcome: Met  Goal: Absence of Hospital-Acquired Illness or Injury  Outcome: Met  Goal: Optimal Comfort and Wellbeing  Outcome: Met  Goal: Readiness for Transition of Care  Outcome: Met     Problem: Diabetes Comorbidity  Goal: Blood Glucose Level Within Targeted Range  Outcome: Met     Problem: Fluid and Electrolyte Imbalance (Acute Kidney Injury/Impairment)  Goal: Fluid and Electrolyte Balance  Outcome: Met     Problem: Oral Intake Inadequate (Acute Kidney Injury/Impairment)  Goal: Optimal Nutrition Intake  Outcome: Met     Problem: Renal Function Impairment (Acute Kidney Injury/Impairment)  Goal: Effective Renal Function  Outcome: Met

## 2022-09-15 NOTE — CONSULTS
[facility]  Endocrinology  Diabetes Consult Note    Consult Requested by: Berkley Matute MD   Reason for admit: Flash pulmonary edema    HISTORY OF PRESENT ILLNESS:  Reason for Consult: Management of T1DM, Hyperglycemia      Diabetes diagnosis year: at 8 years of age     Home Diabetes Medications:  (Per Dr. Mayo with [facility])  Levemir 10 units in the am and 12 units q HS, Humalog 10 units with meals + SSI )ISF 1:35)     How often checking glucose at home?  Freestyle Jayda    BG readings on regimen: variable per patient (100-200s)   Hypoglycemia on the regimen?  Infrequent  Missed doses on regimen?  No     Diabetes Complications include:     Hyperglycemia, Hypoglycemia , gastroparesis, Diabetic nephropathy, and Diabetic chronic kidney disease          Complicating diabetes co morbidities:   HTN, ESRD on dialysis         HPI: Isabela Read is a 27-year-old female with a history of type 1 diabetes, ESRD on HD (T/Th/Sat), and restrictive lung disease who presents with a chief complaint of shortness of breath. Patient last dialyzed yesterday (9/13/22), but explained that she did not receive a full treatment due to arriving late to dialysis. Since admission patient did not receive basal insulin until 8 AM this morning leading to excursions overnight. Endocrine consulted to provide recommendations for discharge.     Lab Results   Component Value Date    HGBA1C 8.3 (H) 05/08/2022           Interval HPI:   Overnight events: No acute events overnight. Patient on the OBS Unit in room 701/701 A. Blood glucose improving. BG at and above goal on current insulin regimen (SSI, prandial, and basal insulin ). Steroid use- None.    Renal function- Abnormal - on HD   Vasopressors-  None       Endocrine will continue to follow and manage insulin orders inpatient.         Diet renal [facility]; Cardiac (Low Na/Chol); Fluid - 1500mL  Diet diabetic  Diet Cardiac  Diet renal     Eating:    100%  Nausea: No  Hypoglycemia and intervention: No  Fever: No  TPN and/or TF: No    PMH, PSH, FH, SH updated and reviewed     ROS:  Review of Systems  Constitutional: Negative for weight changes.  Eyes: Negative for visual disturbance.  Respiratory: Negative for cough.   Cardiovascular: Negative for chest pain.  Gastrointestinal: Negative for nausea.  Endocrine: Negative for polyuria, polydipsia.  Musculoskeletal: Negative for back pain.  Skin: Negative for rash.  Neurological: Negative for syncope.  Psychiatric/Behavioral: Negative for depression.    Current Medications and/or Treatments Impacting Glycemic Control  Immunotherapy:    Immunosuppressants       None          Steroids:   Hormones (From admission, onward)      Start     Stop Route Frequency Ordered    09/14/22 1338  melatonin tablet 6 mg         -- Oral Nightly PRN 09/14/22 1340          Pressors:    Autonomic Drugs (From admission, onward)      None          Hyperglycemia/Diabetes Medications:   Antihyperglycemics (From admission, onward)      Start     Stop Route Frequency Ordered    09/14/22 1645  insulin aspart U-100 pen 10 Units         -- SubQ 3 times daily with meals 09/14/22 1449    09/14/22 1600  insulin detemir U-100 pen 11 Units         -- SubQ 2 times daily 09/14/22 1449    09/14/22 1437  insulin aspart U-100 pen 0-5 Units         -- SubQ Before meals & nightly PRN 09/14/22 1340             PHYSICAL EXAMINATION:  Vitals:    09/15/22 1519   BP: 112/60   Pulse: 92   Resp: 17   Temp: 97.7 °F (36.5 °C)     Body mass index is 23.08 kg/m².    Physical Exam  Constitutional: Well developed, well nourished, NAD.  ENT: External ears no masses with nose patent; normal hearing.  Neck: Supple; trachea midline.  Cardiovascular: Normal heart sounds, no LE edema. DP +2 bilaterally.  Lungs: Normal effort; lungs anterior bilaterally clear to auscultation.  Abdomen: Soft, no masses, no hernias.  MS: No clubbing or cyanosis of nails noted; normal gait.  Skin:  No rashes, lesions, or ulcers; no nodules. Injection sites are ok. No lipo hypertropthy or atrophy.  Psychiatric: Good judgement and insight; normal mood and affect.  Neurological: Cranial nerves are grossly intact.   Foot: Nails in good condition, no amputations noted.        Labs Reviewed and Include   Recent Labs   Lab 09/15/22  0531   *   CALCIUM 8.9   *   K 5.1   CO2 21*   CL 97   BUN 28*   CREATININE 6.7*     Lab Results   Component Value Date    WBC 5.96 09/15/2022    HGB 11.0 (L) 09/15/2022    HCT 34.2 (L) 09/15/2022     (H) 09/15/2022     09/15/2022     Recent Labs   Lab 09/15/22  0531   TSH 2.332     Lab Results   Component Value Date    HGBA1C 8.3 (H) 05/08/2022       Nutritional status:   Body mass index is 23.08 kg/m².  Lab Results   Component Value Date    ALBUMIN 3.4 (L) 09/14/2022    ALBUMIN 3.6 08/01/2022    ALBUMIN 3.6 05/25/2022     No results found for: PREALBUMIN    Estimated Creatinine Clearance: 10.9 mL/min (A) (based on SCr of 6.7 mg/dL (H)).    Accu-Checks  Recent Labs     09/14/22  0938 09/14/22  1509 09/14/22  1727 09/14/22  1949 09/14/22  2055 09/15/22  0001 09/15/22  0721 09/15/22  1300 09/15/22  1510   POCTGLUCOSE 294* 444* 494* 222* 172* 343* 484* 114* 220*        ASSESSMENT and PLAN    * Flash pulmonary edema  Managed per primary team  Avoid hypoglycemia    Patient received dialysis on 9/14 and 9/15, will resume T.Th, Sat at D/C.       Type 1 diabetes mellitus with end-stage renal disease (ESRD)  Endocrinology consulted for BG management.   BG goal 140-180    Recommend 20% reduction home regimen   - Levemir (Insulin Detemir) 8 units in the morning before breakfast and 10 units h.s.   - Novolog (Insulin Aspart) 8 units TIDWM and prn for BG excursions LDC SSI (150/50)  - BG checks AC/HS  - Hypoglycemia protocol in place  - Re-checking A1C to evaluate chronic glycemic control       ** Please notify Endocrine for any change and/or advance in diet**  ** Please  call Endocrine for any BG related issues **    Discharge Planning:   - Continue current regimen managed by Dr. Mayo        Mixed hyperlipidemia  On statin therapy per ADA guidelines.           Plan discussed with patient, family, and RN at bedside.      Domo Devi, DNP, FNP  Endocrinology  [facility]

## 2022-09-15 NOTE — NURSING
Patient alert and oriented X4. Mother is at the bedside.  Blood sugar 484 this morning.  Gave levemir and lispro this morning.  Patient went down for dialysis.

## 2022-09-15 NOTE — DISCHARGE SUMMARY
[facility] Medicine  Discharge Summary      Patient Name: Isabela Read  MRN: [id number]  Patient Class: OP- Observation  Admission Date: 9/14/2022  Hospital Length of Stay: 0 days  Discharge Date and Time:  09/15/2022 6:25 PM  Attending Physician: No att. providers found   Discharging Provider: Berkley Matute MD  Primary Care Provider: Tavo Bhatia MD  Hospital Medicine Team: [facility]  Berkley Matute MD    HPI:   Isabela Read is a 27-year-old female with a history of type 1 diabetes, ESRD on HD (T/Th/Sat), and restrictive lung disease who presents acute onset of shortness of breath. Patient reports shortened HD session yesterday (9/13) with 1.9 L fluid removal (usual 2.8L) due to oversleeping and issues with machine. Patient experienced acute onset of orthopnea, SOB, nausea, vomiting, and hemoptysis so presented to ED. She reports history of hemoptysis in May associated with hypervolemia. She is on depo-provera for birth control, no recent surgeries, trips. She denies any leg swelling or chest pain. She also endorses difficulty with blood pressure control. She was taken off hydralazine ~month ago due to hypotension, but over past two weeks blood pressure has been uncontrolled. She restarted hydralazine 50 mg BID X 1 week but continues to have elevated BP readings (SBP ~180s). Patient denies fever, chills, diaphoresis, abdominal pain, focal numbness, weakness, HA. She makes a small amount of urine.     In ED, patient tachycardic to 121, /145, 88% on RA. Patient placed on BIPAP and given IV hydralazine, home coreg, nifedipine, and started on nitro gtt. CXR with pulmonary edema. Underwent HD with 3.5 L removed, able to wean to 2L and d/c nitro gtt. BP remained uncontrolled and given additional IV hydralazine with improvement to 170/81. D-dimer positive. Discussed with nephrology, ok for CTA, ordered. Patient admitted to observation for HTN urgency.       * No  surgery found *      Hospital Course:   Patient on RA after HD complete.  CTA of chest with motion artifact vs unlikely filling defect. Lower ext venous US negative for DVT - overall low suspicion for PTE + rapid improvement with iHD, therefore not treated with AC. BP with significant improvement after iHD x2 while admitted. Discussed at length with patient re BP, specifically that her significant elevation is likely due to her needing iHD. She will hold the hydralazine for now given reassuring pressures, follow up in iHD clinic. Should she develop HTN before that time she will call renal. She voiced understanding and agreement with plan. Discharged with strict return precautions.        Goals of Care Treatment Preferences:  Code Status: Full Code      Consults:   Consults (From admission, onward)        Status Ordering Provider     Inpatient consult to Endocrinology  Once        Provider:  (Not yet assigned)    Completed MACHO PUGA     Inpatient consult to Nephrology  Once        Provider:  (Not yet assigned)    Completed WILLY WEST          No new Assessment & Plan notes have been filed under this hospital service since the last note was generated.  Service: Hospital Medicine    Final Active Diagnoses:    Diagnosis Date Noted POA    PRINCIPAL PROBLEM:  Flash pulmonary edema [J81.0] 09/18/2021 Yes    ESRD on hemodialysis [N18.6, Z99.2] 09/14/2022 Not Applicable    Chronic kidney disease-mineral and bone disorder [N18.9, E83.9, M89.9] 09/14/2022 Yes    Sinus tachycardia [R00.0] 09/14/2022 Yes    Abnormal CT scan of lung [R91.8] 09/14/2022 Yes    (HFpEF) heart failure with preserved ejection fraction [I50.30] 05/25/2022 Yes    Acute and chronic respiratory failure with hypoxia [J96.21] 02/04/2022 Yes    Anemia due to chronic kidney disease, on chronic dialysis [N18.6, D63.1, Z99.2] 01/18/2022 Not Applicable    Hypertensive urgency [I16.0] 01/18/2022 Yes    Hypertensive chronic kidney disease with  stage 5 chronic kidney disease or end stage renal disease [I12.0] 05/31/2021 Yes    Mixed hyperlipidemia [E78.2] 05/22/2019 Yes    Renovascular hypertension [I15.0] 05/07/2015 Yes    Type 1 diabetes mellitus with end-stage renal disease (ESRD) [E10.22, N18.6] 02/24/2015 Yes     Chronic      Problems Resolved During this Admission:    Diagnosis Date Noted Date Resolved POA    Restrictive lung disease [J98.4] 08/04/2020 09/14/2022 Yes     Chronic       Discharged Condition: good    Disposition: Home or Self Care    Follow Up:    Patient Instructions:      Diet diabetic     Diet Cardiac     Diet renal     Notify your health care provider if you experience any of the following:  temperature >100.4     Notify your health care provider if you experience any of the following:  persistent nausea and vomiting or diarrhea     Notify your health care provider if you experience any of the following:  severe uncontrolled pain     Notify your health care provider if you experience any of the following:  difficulty breathing or increased cough     Notify your health care provider if you experience any of the following:  severe persistent headache     Notify your health care provider if you experience any of the following:  worsening rash     Notify your health care provider if you experience any of the following:  persistent dizziness, light-headedness, or visual disturbances     Notify your health care provider if you experience any of the following:  increased confusion or weakness     Activity as tolerated       Significant Diagnostic Studies: Labs: All labs within the past 24 hours have been reviewed    Pending Diagnostic Studies:     None         Medications:  Reconciled Home Medications:      Medication List      CHANGE how you take these medications    hydrALAZINE 25 MG tablet  Commonly known as: APRESOLINE  Take 1 tablet (25 mg total) by mouth every 8 (eight) hours.  What changed: how much to take     prednisoLONE  "acetate 1 % Drps  Commonly known as: PRED FORTE  Place 1 drop into the right eye 4 (four) times daily.  What changed: when to take this        CONTINUE taking these medications    aspirin 81 MG EC tablet  Commonly known as: ECOTRIN  Take 81 mg by mouth once daily.     blood sugar diagnostic Strp  To check BG 4 - 6 times daily, to use with insurance preferred meter     calcitRIOL 0.5 MCG Cap  Commonly known as: ROCALTROL  Take 0.5 mcg by mouth once daily.     carvediloL 25 MG tablet  Commonly known as: COREG  Take 25 mg by mouth 2 (two) times daily with meals.     fluticasone propionate 50 mcg/actuation nasal spray  Commonly known as: FLONASE  1 spray by Each Nostril route daily as needed for Rhinitis or Allergies.     FREESTYLE MARCY 14 DAY READER Misc  Generic drug: flash glucose scanning reader  1 each by Misc.(Non-Drug; Combo Route) route once daily.     FREESTYLE MARCY 14 DAY SENSOR Kit  Generic drug: flash glucose sensor  6 kits by Misc.(Non-Drug; Combo Route) route once daily.     HAIR,SKIN AND NAILS(FA-BIOTIN) ORAL  Take 2 tablets by mouth once daily.     insulin detemir U-100 100 unit/mL (3 mL) Inpn pen  Commonly known as: Levemir FLEXTOUCH  10 units subcutaneously in the morning and 12 units sq at night     insulin lispro 100 unit/mL pen  Commonly known as: HumaLOG KwikPen Insulin  Inject 10 units into skin the skin 3 three times daily with meals     lancets Misc  To check BG 4 - 6 times daily, to use with insurance preferred meter     medroxyPROGESTERone 150 mg/mL Syrg  Commonly known as: DEPO-PROVERA  Inject 1 mL (150 mg total) into the muscle once. for 1 dose     NIFEdipine 60 MG (OSM) 24 hr tablet  Commonly known as: PROCARDIA-XL  Take 60 mg by mouth 2 (two) times a day.     pen needle, diabetic 32 gauge x 5/32" Ndle  For three times daily injection     rosuvastatin 10 MG tablet  Commonly known as: CRESTOR  TAKE 1 TABLET BY MOUTH ONCE DAILY IN THE EVENING     sevelamer carbonate 800 mg Tab  Commonly " known as: RENVELA  TAKE 1 TABLET BY MOUTH THREE TIMES DAILY WITH MEALS     traZODone 50 MG tablet  Commonly known as: DESYREL  Take 1 tablet (50 mg total) by mouth nightly as needed for Insomnia.     TYLENOL EXTRA STRENGTH ORAL  Take 2 tablets by mouth daily as needed (PAIN).     venlafaxine 37.5 MG 24 hr capsule  Commonly known as: EFFEXOR XR  Take 1 capsule (37.5 mg total) by mouth once daily.            Indwelling Lines/Drains at time of discharge:   Lines/Drains/Airways     Drain  Duration                Hemodialysis AV Fistula Left upper arm -- days                Time spent on the discharge of patient: 35 minutes         May SANFORD Matute MD  Department of Hospital Medicine  [facility]

## 2022-09-15 NOTE — SUBJECTIVE & OBJECTIVE
Interval HPI:   Overnight events: No acute events overnight. Patient on the OBS Unit in room 701/701 A. Blood glucose improving. BG at and above goal on current insulin regimen (SSI, prandial, and basal insulin ). Steroid use- None.    Renal function- Abnormal - on HD   Vasopressors-  None       Endocrine will continue to follow and manage insulin orders inpatient.         Diet renal [facility]; Cardiac (Low Na/Chol); Fluid - 1500mL  Diet diabetic  Diet Cardiac  Diet renal     Eatin%  Nausea: No  Hypoglycemia and intervention: No  Fever: No  TPN and/or TF: No    PMH, PSH, FH, SH updated and reviewed     ROS:  Review of Systems  Constitutional: Negative for weight changes.  Eyes: Negative for visual disturbance.  Respiratory: Negative for cough.   Cardiovascular: Negative for chest pain.  Gastrointestinal: Negative for nausea.  Endocrine: Negative for polyuria, polydipsia.  Musculoskeletal: Negative for back pain.  Skin: Negative for rash.  Neurological: Negative for syncope.  Psychiatric/Behavioral: Negative for depression.    Current Medications and/or Treatments Impacting Glycemic Control  Immunotherapy:    Immunosuppressants       None          Steroids:   Hormones (From admission, onward)      Start     Stop Route Frequency Ordered    22 1338  melatonin tablet 6 mg         -- Oral Nightly PRN 22 1340          Pressors:    Autonomic Drugs (From admission, onward)      None          Hyperglycemia/Diabetes Medications:   Antihyperglycemics (From admission, onward)      Start     Stop Route Frequency Ordered    22 1645  insulin aspart U-100 pen 10 Units         -- SubQ 3 times daily with meals 22 1449    22 1600  insulin detemir U-100 pen 11 Units         -- SubQ 2 times daily 22 1449    22 1437  insulin aspart U-100 pen 0-5 Units         -- SubQ Before meals & nightly PRN 22 1340             PHYSICAL EXAMINATION:  Vitals:    09/15/22 1519   BP: 112/60    Pulse: 92   Resp: 17   Temp: 97.7 °F (36.5 °C)     Body mass index is 23.08 kg/m².    Physical Exam  Constitutional: Well developed, well nourished, NAD.  ENT: External ears no masses with nose patent; normal hearing.  Neck: Supple; trachea midline.  Cardiovascular: Normal heart sounds, no LE edema. DP +2 bilaterally.  Lungs: Normal effort; lungs anterior bilaterally clear to auscultation.  Abdomen: Soft, no masses, no hernias.  MS: No clubbing or cyanosis of nails noted; normal gait.  Skin: No rashes, lesions, or ulcers; no nodules. Injection sites are ok. No lipo hypertropthy or atrophy.  Psychiatric: Good judgement and insight; normal mood and affect.  Neurological: Cranial nerves are grossly intact.   Foot: Nails in good condition, no amputations noted.

## 2022-09-15 NOTE — PLAN OF CARE
[facility]  Discharge Assessment    Primary Care Provider: Tavo Bhatia MD     Discharge Assessment (most recent)       BRIEF DISCHARGE ASSESSMENT - 09/15/22 1232          Discharge Planning    Assessment Type Discharge Planning Brief Assessment     Resource/Environmental Concerns none     Support Systems Parent;Family members     Equipment Currently Used at Home none     Current Living Arrangements home/apartment/condo     Patient/Family Anticipates Transition to home     Patient/Family Anticipated Services at Transition none     DME Needed Upon Discharge  none     Discharge Plan A Home                   D/c assessment completed with pts mother as pt off the floor at HD.    Pt is independent with ADLs and mobility. She does not use DME/HH.    She is on HD-goes to [facility] on [address] (TTS).    Pt will drive herself home at d/c.    Kirsty Genao LCSW  PRN

## 2022-09-15 NOTE — PHARMACY MED REC
"Admission Medication History     The home medication history was taken by Milan Parikh.    You may go to "Admission" then "Reconcile Home Medications" tabs to review and/or act upon these items.     The home medication list has been updated by the Pharmacy department.   Please read ALL comments highlighted in yellow.   Please address this information as you see fit.    Feel free to contact us if you have any questions or require assistance.      The medications listed below were removed from the home medication list. Please reorder if appropriate:  Patient reports no longer taking the following medication(s):  ACETAMINOPHEN 325 MG    Medications listed below were obtained from: Patient/family  Current Outpatient Medications on File Prior to Encounter   Medication Sig    acetaminophen (TYLENOL EXTRA STRENGTH ORAL)   Take 2 tablets by mouth daily as needed (PAIN).    aspirin (ECOTRIN) 81 MG EC tablet   Take 81 mg by mouth once daily.    calcitRIOL (ROCALTROL) 0.5 MCG Cap   Take 0.5 mcg by mouth once daily.    carvediloL (COREG) 25 MG tablet   Take 25 mg by mouth 2 (two) times daily with meals.    fluticasone propionate (FLONASE) 50   mcg/actuation nasal spray   1 spray by Each Nostril route daily as needed for Rhinitis or Allergies.    hydrALAZINE (APRESOLINE) 25 MG tablet   Take 50 mg by mouth 2 (two) times a day.)    insulin detemir U-100 (LEVEMIR FLEXTOUCH) 100 unit/mL (3 mL) SubQ InPn pen   10 units subcutaneously in the morning and 12 units sq at night    insulin lispro (HUMALOG KWIKPEN INSULIN) 100 unit/mL pen   Inject 10 units into skin the skin 3 three times daily with meals    medroxyPROGESTERone (DEPO-PROVERA) 150 mg/mL Syrg   Inject 1 mL (150 mg total) into the muscle once. for 1 dose    multivit-min/folic acid/biotin (HAIR,SKIN AND NAILS,FA-BIOTIN, ORAL)   Take 2 tablets by mouth once daily.    NIFEdipine (PROCARDIA-XL) 60 MG (OSM) 24 hr tablet   Take 60 mg by mouth 2 (two) times a day.    " prednisoLONE acetate (PRED FORTE) 1 % DrpS   Place 1 drop into the right eye once daily.)      rosuvastatin (CRESTOR) 10 MG tablet   TAKE 1 TABLET BY MOUTH ONCE DAILY IN THE EVENING    sevelamer carbonate (RENVELA) 800 mg Tab   TAKE 1 TABLET BY MOUTH THREE TIMES DAILY WITH MEALS    traZODone (DESYREL) 50 MG tablet   Take 1 tablet (50 mg total) by mouth nightly as needed for Insomnia.    venlafaxine (EFFEXOR XR) 37.5 MG 24 hr capsule   Take 1 capsule (37.5 mg total) by mouth once daily.    Patients reports METHOXY-PEG-EPOETIN BETA may be something she receives at dialysis.      Milan Parikh  EXT [id number]                 .

## 2022-09-15 NOTE — PROGRESS NOTES
[facility] HEMODIALYSIS NOTE     Patient currently on hemodialysis for removal of uremic toxins and volume.     Patient seen and evaluated on hemodialysis, tolerating treatment, see HD flowsheet for vitals and assessments.    Labs have been reviewed and the dialysate bath has been adjusted.       Assessment/Plan:    -ESRD on HD   -Patient seen on HD, tolerating treatment well, w/o complaints   -UF goal of 3L as tolerated   -Renal diet, if not NPO   -Continue calcitriol 0.5mcg daily   -Continue renvela 800mg tid with meals   -Strict I/O's and daily weights  -Daily renal function panels  -Keep MAP >65 while on HD   -Maintain Hgb >7.0  -Will continue to follow while inpatient       Clayton Lovell NP  Nephrology  Pager:  [phone]

## 2022-09-15 NOTE — PLAN OF CARE
Problem: Hemodynamic Instability (Hemodialysis)  Goal: Effective Tissue Perfusion  Outcome: Ongoing, Progressing    Dialysis tx ended. Hemostasis achieved to the left arm AV fistula, no complaints.    Net fluid removed:2.3 L    Report given to nurse Neli. Pt transported from [facility] to room 701

## 2022-09-16 DIAGNOSIS — Z76.82 ORGAN TRANSPLANT CANDIDATE: Primary | ICD-10-CM

## 2022-09-16 DIAGNOSIS — Z76.82 AWAITING ORGAN TRANSPLANT STATUS: ICD-10-CM

## 2022-09-19 ENCOUNTER — OFFICE VISIT (OUTPATIENT)
Dept: OPHTHALMOLOGY | Facility: CLINIC | Age: 27
End: 2022-09-19
Payer: MEDICARE

## 2022-09-19 ENCOUNTER — DOCUMENTATION ONLY (OUTPATIENT)
Dept: PHARMACY | Facility: CLINIC | Age: 27
End: 2022-09-19
Payer: MEDICARE

## 2022-09-19 DIAGNOSIS — E10.3521: Primary | ICD-10-CM

## 2022-09-19 DIAGNOSIS — H27.01 APHAKIA, RIGHT: ICD-10-CM

## 2022-09-19 DIAGNOSIS — E10.3592 TYPE 1 DIABETES MELLITUS WITH PROLIFERATIVE RETINOPATHY OF LEFT EYE WITHOUT MACULAR EDEMA: ICD-10-CM

## 2022-09-19 PROCEDURE — 92134 CPTRZ OPH DX IMG PST SGM RTA: CPT | Mod: S$GLB,,, | Performed by: OPHTHALMOLOGY

## 2022-09-19 PROCEDURE — 3066F NEPHROPATHY DOC TX: CPT | Mod: CPTII,S$GLB,, | Performed by: OPHTHALMOLOGY

## 2022-09-19 PROCEDURE — 1159F MED LIST DOCD IN RCRD: CPT | Mod: CPTII,S$GLB,, | Performed by: OPHTHALMOLOGY

## 2022-09-19 PROCEDURE — 3066F PR DOCUMENTATION OF TREATMENT FOR NEPHROPATHY: ICD-10-PCS | Mod: CPTII,S$GLB,, | Performed by: OPHTHALMOLOGY

## 2022-09-19 PROCEDURE — 92134 POSTERIOR SEGMENT OCT RETINA (OCULAR COHERENCE TOMOGRAPHY)-BOTH EYES: ICD-10-PCS | Mod: S$GLB,,, | Performed by: OPHTHALMOLOGY

## 2022-09-19 PROCEDURE — 3046F HEMOGLOBIN A1C LEVEL >9.0%: CPT | Mod: CPTII,S$GLB,, | Performed by: OPHTHALMOLOGY

## 2022-09-19 PROCEDURE — 1159F PR MEDICATION LIST DOCUMENTED IN MEDICAL RECORD: ICD-10-PCS | Mod: CPTII,S$GLB,, | Performed by: OPHTHALMOLOGY

## 2022-09-19 PROCEDURE — 92012 INTRM OPH EXAM EST PATIENT: CPT | Mod: S$GLB,,, | Performed by: OPHTHALMOLOGY

## 2022-09-19 PROCEDURE — 1160F PR REVIEW ALL MEDS BY PRESCRIBER/CLIN PHARMACIST DOCUMENTED: ICD-10-PCS | Mod: CPTII,S$GLB,, | Performed by: OPHTHALMOLOGY

## 2022-09-19 PROCEDURE — 3046F PR MOST RECENT HEMOGLOBIN A1C LEVEL > 9.0%: ICD-10-PCS | Mod: CPTII,S$GLB,, | Performed by: OPHTHALMOLOGY

## 2022-09-19 PROCEDURE — 1160F RVW MEDS BY RX/DR IN RCRD: CPT | Mod: CPTII,S$GLB,, | Performed by: OPHTHALMOLOGY

## 2022-09-19 PROCEDURE — 99999 PR PBB SHADOW E&M-EST. PATIENT-LVL III: ICD-10-PCS | Mod: PBBFAC,,, | Performed by: OPHTHALMOLOGY

## 2022-09-19 PROCEDURE — 4010F PR ACE/ARB THEARPY RXD/TAKEN: ICD-10-PCS | Mod: CPTII,S$GLB,, | Performed by: OPHTHALMOLOGY

## 2022-09-19 PROCEDURE — 92012 PR EYE EXAM, EST PATIENT,INTERMED: ICD-10-PCS | Mod: S$GLB,,, | Performed by: OPHTHALMOLOGY

## 2022-09-19 PROCEDURE — 99999 PR PBB SHADOW E&M-EST. PATIENT-LVL III: CPT | Mod: PBBFAC,,, | Performed by: OPHTHALMOLOGY

## 2022-09-19 PROCEDURE — 4010F ACE/ARB THERAPY RXD/TAKEN: CPT | Mod: CPTII,S$GLB,, | Performed by: OPHTHALMOLOGY

## 2022-09-19 NOTE — PROGRESS NOTES
Patient received medroxyprogesterone 150mg/mL on 09/19/2022 in RIGHT ARM outer gluteal region   LOT [id number]  EXP 05/2024  Patient advised to receive next injection between:     12/05-12/19/2022

## 2022-09-19 NOTE — PROGRESS NOTES
Subjective:       Patient ID: Isabela Read is a 27 y.o. female      Chief Complaint   Patient presents with    Follow-up     History of Present Illness  HPI    6 wk fu OCT/DFE    Pt states her va is stable. No new symptoms or concerns today.  Vision is   poor OD    No flashes  No floaters  No pain  Gtts: PF Qday OD      Last edited by Maximilian Montaño MD on 9/19/2022  1:28 PM.        Imaging:    See report    Assessment/Plan:     1. 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 Type 1 diabetes mellitus with proliferative retinopathy of left eye without macular edema  Doing well  S/p PRP  Observe    Diabetic Retinopathy discussed in detail, all questions answered  Stressed importance of good BS/BP/Chol Control  RTC immediately PRN any vision changes, laure blurry vision, missing vision, floaters, distortions, etc    Follow up in about 1 month (around 10/19/2022), or if symptoms worsen or fail to improve, for Comprehensive Examination, OCT Mac.

## 2022-09-21 ENCOUNTER — OFFICE VISIT (OUTPATIENT)
Dept: ENDOCRINOLOGY | Facility: CLINIC | Age: 27
End: 2022-09-21
Payer: MEDICARE

## 2022-09-21 ENCOUNTER — TELEPHONE (OUTPATIENT)
Dept: TRANSPLANT | Facility: CLINIC | Age: 27
End: 2022-09-21
Payer: MEDICARE

## 2022-09-21 VITALS
HEIGHT: 64 IN | OXYGEN SATURATION: 97 % | DIASTOLIC BLOOD PRESSURE: 76 MMHG | SYSTOLIC BLOOD PRESSURE: 110 MMHG | WEIGHT: 143.06 LBS | HEART RATE: 100 BPM | BODY MASS INDEX: 24.42 KG/M2

## 2022-09-21 DIAGNOSIS — E11.649 HYPOGLYCEMIA ASSOCIATED WITH DIABETES: ICD-10-CM

## 2022-09-21 DIAGNOSIS — E10.22 TYPE 1 DIABETES MELLITUS WITH END-STAGE RENAL DISEASE (ESRD): Chronic | ICD-10-CM

## 2022-09-21 DIAGNOSIS — N18.6 TYPE 1 DIABETES MELLITUS WITH END-STAGE RENAL DISEASE (ESRD): Chronic | ICD-10-CM

## 2022-09-21 PROCEDURE — 3066F PR DOCUMENTATION OF TREATMENT FOR NEPHROPATHY: ICD-10-PCS | Mod: CPTII,NTX,S$GLB, | Performed by: INTERNAL MEDICINE

## 2022-09-21 PROCEDURE — 1159F MED LIST DOCD IN RCRD: CPT | Mod: CPTII,NTX,S$GLB, | Performed by: INTERNAL MEDICINE

## 2022-09-21 PROCEDURE — 99999 PR PBB SHADOW E&M-EST. PATIENT-LVL IV: ICD-10-PCS | Mod: PBBFAC,TXP,, | Performed by: INTERNAL MEDICINE

## 2022-09-21 PROCEDURE — 3046F PR MOST RECENT HEMOGLOBIN A1C LEVEL > 9.0%: ICD-10-PCS | Mod: CPTII,NTX,S$GLB, | Performed by: INTERNAL MEDICINE

## 2022-09-21 PROCEDURE — 3078F PR MOST RECENT DIASTOLIC BLOOD PRESSURE < 80 MM HG: ICD-10-PCS | Mod: CPTII,NTX,S$GLB, | Performed by: INTERNAL MEDICINE

## 2022-09-21 PROCEDURE — 3008F BODY MASS INDEX DOCD: CPT | Mod: CPTII,NTX,S$GLB, | Performed by: INTERNAL MEDICINE

## 2022-09-21 PROCEDURE — 99213 OFFICE O/P EST LOW 20 MIN: CPT | Mod: NTX,S$GLB,, | Performed by: INTERNAL MEDICINE

## 2022-09-21 PROCEDURE — 3046F HEMOGLOBIN A1C LEVEL >9.0%: CPT | Mod: CPTII,NTX,S$GLB, | Performed by: INTERNAL MEDICINE

## 2022-09-21 PROCEDURE — 3066F NEPHROPATHY DOC TX: CPT | Mod: CPTII,NTX,S$GLB, | Performed by: INTERNAL MEDICINE

## 2022-09-21 PROCEDURE — 3078F DIAST BP <80 MM HG: CPT | Mod: CPTII,NTX,S$GLB, | Performed by: INTERNAL MEDICINE

## 2022-09-21 PROCEDURE — 3074F SYST BP LT 130 MM HG: CPT | Mod: CPTII,NTX,S$GLB, | Performed by: INTERNAL MEDICINE

## 2022-09-21 PROCEDURE — 1159F PR MEDICATION LIST DOCUMENTED IN MEDICAL RECORD: ICD-10-PCS | Mod: CPTII,NTX,S$GLB, | Performed by: INTERNAL MEDICINE

## 2022-09-21 PROCEDURE — 3074F PR MOST RECENT SYSTOLIC BLOOD PRESSURE < 130 MM HG: ICD-10-PCS | Mod: CPTII,NTX,S$GLB, | Performed by: INTERNAL MEDICINE

## 2022-09-21 PROCEDURE — 99999 PR PBB SHADOW E&M-EST. PATIENT-LVL IV: CPT | Mod: PBBFAC,TXP,, | Performed by: INTERNAL MEDICINE

## 2022-09-21 PROCEDURE — 4010F PR ACE/ARB THEARPY RXD/TAKEN: ICD-10-PCS | Mod: CPTII,NTX,S$GLB, | Performed by: INTERNAL MEDICINE

## 2022-09-21 PROCEDURE — 3008F PR BODY MASS INDEX (BMI) DOCUMENTED: ICD-10-PCS | Mod: CPTII,NTX,S$GLB, | Performed by: INTERNAL MEDICINE

## 2022-09-21 PROCEDURE — 99213 PR OFFICE/OUTPT VISIT, EST, LEVL III, 20-29 MIN: ICD-10-PCS | Mod: NTX,S$GLB,, | Performed by: INTERNAL MEDICINE

## 2022-09-21 PROCEDURE — 4010F ACE/ARB THERAPY RXD/TAKEN: CPT | Mod: CPTII,NTX,S$GLB, | Performed by: INTERNAL MEDICINE

## 2022-09-21 NOTE — PROGRESS NOTES
"Subjective:      Patient ID: Isabela Read is a 27 y.o. female.    Chief Complaint:  Diabetes      History of Present Illness  Ms. Read is a 27 year old woman who is here for evaluation of type 1 DM, on HD and MDI.   T1DM diagnosed at age 8 years.   Currently on transplant list (kidney/pancreas)    Recent admissioinfor fluid overload. Admitted for five days. Shortness of breath, cough with bloody sputum and vomiting.     Diabetes is complicated by:   1) ESRD on dialysis   2) Hypoglycemia  3) DM proliferative retinopathy with retinal detachment (right eye)    Current regimen:  Levemir 10/12  Humalog 10 units with meals (takes less if eating less), however frequently feels full earlier in a meal.     Once weekly has a low blood sugar, most recent 60s, but can be as low as 40. Usually occurs during the day (can't recall the last hypoglycemic episode that occurred at night).    Not using ally -- during duramed, waiting on sensors. Also needs authorization (ally 2)  Today blood sugar was 148    Review of Systems   Constitutional:  Negative for fever.   HENT:  Negative for congestion.    Eyes:  Negative for visual disturbance.   Respiratory:  Negative for shortness of breath.    Cardiovascular:  Negative for chest pain.   Gastrointestinal:  Negative for abdominal pain and diarrhea.   Genitourinary:  Negative for dysuria.   Musculoskeletal:  Negative for arthralgias.   Skin:  Negative for rash.   Neurological:  Negative for weakness.   Hematological:  Does not bruise/bleed easily.   Psychiatric/Behavioral:  Negative for sleep disturbance.      Objective:   Physical Exam  Vitals:    09/21/22 1138   BP: 110/76   BP Location: Right arm   Patient Position: Sitting   BP Method: Medium (Manual)   Pulse: 100   SpO2: 97%   Weight: 64.9 kg (143 lb 1.3 oz)   Height: 5' 4" (1.626 m)       BP Readings from Last 3 Encounters:   09/21/22 110/76   09/15/22 112/60   09/07/22 (!) 194/96     Wt Readings from Last 1 Encounters: "   09/21/22 1138 64.9 kg (143 lb 1.3 oz)         Body mass index is 24.56 kg/m².    Lab Review:   Lab Results   Component Value Date    HGBA1C 9.8 (H) 09/15/2022     Lab Results   Component Value Date    CHOL 135 10/11/2021    HDL 71 10/11/2021    LDLCALC 56.8 (L) 10/11/2021    TRIG 36 10/11/2021    CHOLHDL 52.6 (H) 10/11/2021     Lab Results   Component Value Date     (L) 09/15/2022    K 5.1 09/15/2022    CL 97 09/15/2022    CO2 21 (L) 09/15/2022     (H) 09/15/2022    BUN 28 (H) 09/15/2022    CREATININE 6.7 (H) 09/15/2022    CALCIUM 8.9 09/15/2022    PROT 7.8 09/14/2022    ALBUMIN 3.4 (L) 09/14/2022    BILITOT 0.7 09/14/2022    ALKPHOS 103 09/14/2022    AST 30 09/14/2022    ALT 13 09/14/2022    ANIONGAP 14 09/15/2022    ESTGFRAFRICA 10.4 (A) 05/25/2022    EGFRNONAA 9.1 (A) 05/25/2022    TSH 2.332 09/15/2022      Latest Reference Range & Units 05/08/22 08:15 09/15/22 05:31   Hemoglobin A1C External 4.0 - 5.6 % 8.3 (H) 9.8 (H)   Estimated Avg Glucose 68 - 131 mg/dL 192 (H) 235 (H)   (H): Data is abnormally high    Assessment and Plan     No problem-specific Assessment & Plan notes found for this encounter.

## 2022-09-21 NOTE — ASSESSMENT & PLAN NOTE
Continue levemir 10/12 units   Does not have ally sensor - waiting to get more samples     Continue to use ally sensor for management of intensive insulin regimen including five insulin injections daily.  Uses therapeutic CGM for the necessary frequent adjustments of insulin therapy. we will continue to have visits (virtual or in person as allowed by COVID pandemic) every 1 -6 months to assess adherence to their CGM regimen and diabetes treatment plan.  Due to COVID pandemic and need for remote monitoring this tool is medically necessary.

## 2022-09-21 NOTE — PROGRESS NOTES
"Subjective:      Patient ID: Isabela Read is a 27 y.o. female.    Chief Complaint:  Diabetes      History of Present Illness    Ms. Read is a 27 year old woman who is here for evaluation of type 1 DM, on HD and MDI.   T1DM diagnosed at age 8 years.     Diabetes is complicated by:   1) ESRD on dialysis   2) Hypoglycemia  3) DM proliferative retinopathy with retinal detachment (right eye)    No log or ally for review     Recent admission for fluid overload. Has shortness of breath and can hear a gurgling feeling. Admitted to hospital for a few days.   Has hypoglycemia, usually related to eating less than expected.     Review of Systems  No recent illness     Objective:   Physical Exam  Vitals:    09/21/22 1138   BP: 110/76   BP Location: Right arm   Patient Position: Sitting   BP Method: Medium (Manual)   Pulse: 100   SpO2: 97%   Weight: 64.9 kg (143 lb 1.3 oz)   Height: 5' 4" (1.626 m)       BP Readings from Last 3 Encounters:   09/21/22 110/76   09/15/22 112/60   09/07/22 (!) 194/96     Wt Readings from Last 1 Encounters:   09/21/22 1138 64.9 kg (143 lb 1.3 oz)         Body mass index is 24.56 kg/m².    Lab Review:   Lab Results   Component Value Date    HGBA1C 9.8 (H) 09/15/2022     Lab Results   Component Value Date    CHOL 135 10/11/2021    HDL 71 10/11/2021    LDLCALC 56.8 (L) 10/11/2021    TRIG 36 10/11/2021    CHOLHDL 52.6 (H) 10/11/2021     Lab Results   Component Value Date     (L) 09/15/2022    K 5.1 09/15/2022    CL 97 09/15/2022    CO2 21 (L) 09/15/2022     (H) 09/15/2022    BUN 28 (H) 09/15/2022    CREATININE 6.7 (H) 09/15/2022    CALCIUM 8.9 09/15/2022    PROT 7.8 09/14/2022    ALBUMIN 3.4 (L) 09/14/2022    BILITOT 0.7 09/14/2022    ALKPHOS 103 09/14/2022    AST 30 09/14/2022    ALT 13 09/14/2022    ANIONGAP 14 09/15/2022    ESTGFRAFRICA 10.4 (A) 05/25/2022    EGFRNONAA 9.1 (A) 05/25/2022    TSH 2.332 09/15/2022         Assessment and Plan     Type 1 diabetes mellitus with " end-stage renal disease (ESRD)  Continue levemir 10/12 units   Does not have ally sensor - waiting to get more samples     Continue to use ally sensor for management of intensive insulin regimen including five insulin injections daily.  Uses therapeutic CGM for the necessary frequent adjustments of insulin therapy. we will continue to have visits (virtual or in person as allowed by COVID pandemic) every 1 -6 months to assess adherence to their CGM regimen and diabetes treatment plan.  Due to COVID pandemic and need for remote monitoring this tool is medically necessary.    Hypoglycemia associated with diabetes  Continues to have hypoglycemia   Can given half the amount before the meal and if completes the meal the other half of the dose of insulin.     Needs to bring log for review so we can make adjustments  Needs sensor.

## 2022-09-25 PROBLEM — E11.649 HYPOGLYCEMIA ASSOCIATED WITH DIABETES: Status: ACTIVE | Noted: 2022-09-25

## 2022-09-25 NOTE — ASSESSMENT & PLAN NOTE
Continues to have hypoglycemia   Can given half the amount before the meal and if completes the meal the other half of the dose of insulin.     Needs to bring log for review so we can make adjustments  Needs sensor.

## 2022-09-26 ENCOUNTER — TELEPHONE (OUTPATIENT)
Dept: TRANSPLANT | Facility: CLINIC | Age: 27
End: 2022-09-26
Payer: MEDICARE

## 2022-09-30 ENCOUNTER — TELEPHONE (OUTPATIENT)
Dept: TRANSPLANT | Facility: CLINIC | Age: 27
End: 2022-09-30
Payer: MEDICARE

## 2022-09-30 NOTE — LETTER
Date: 9/30/2022          Listed Patient      To: Dialysis Unit  and Charge RN From: [facility] and      Kidney Transplant Nurse Coordinators    RE: Isabela Delaney Read, 1995, [id number]     At [facility], we conduct adherence checks as an important part of transplant care. Initial and listed patient assessments are not complete without adherence information.        Please complete the following information:                 Data from the last 3 months:  (data from last 3 months preferred):    Number of AMAs with dates, time, and reasons: ____________________________________________________    ______________________________________________________________________________________________    ______________________________________________________________________________________________    Number of No-Shows with dates and reasons: ______________________________________________________      ______________________________________________________________________________________________      Any concerns with Caregivers:  YES / NO    If yes, please explain:  ___________________________________________________________________________    ______________________________________________________________________________________________     Any concerns with Transportation:  YES / NO    If yes, please explain:  ___________________________________________________________________________    ______________________________________________________________________________________________    Any Psychiatric and/or Psychosocial concerns:  YES / NO     If yes, please explain: ___________________________________________________________________________    ______________________________________________________________________________________________      PLEASE RETURN TO: FAX: [phone]     Thank you for collaborating with us in the care of this patient.            [address]  ?  [place] [address]  ?  phone [phone]  ?  fax [phone]  ?  [email or web address]  Confidentiality notice: The accompanying facsimile is intended solely for the use of the recipient designated above. Document(s) transmitted herewith may contain information that is confidential and privileged. Delivery, distribution or dissemination of this communication other than to the intended recipient is strictly prohibited. If you have received this facsimile in error, please notify us immediately by telephone.
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
## 2022-10-11 ENCOUNTER — OFFICE VISIT (OUTPATIENT)
Dept: TRANSPLANT | Facility: CLINIC | Age: 27
End: 2022-10-11
Payer: MEDICARE

## 2022-10-11 VITALS
DIASTOLIC BLOOD PRESSURE: 94 MMHG | RESPIRATION RATE: 16 BRPM | SYSTOLIC BLOOD PRESSURE: 173 MMHG | WEIGHT: 143.75 LBS | HEIGHT: 65 IN | TEMPERATURE: 97 F | HEART RATE: 106 BPM | BODY MASS INDEX: 23.95 KG/M2 | OXYGEN SATURATION: 98 %

## 2022-10-11 DIAGNOSIS — N18.6 TYPE 1 DIABETES MELLITUS WITH END-STAGE RENAL DISEASE (ESRD): Chronic | ICD-10-CM

## 2022-10-11 DIAGNOSIS — Z99.2 ANEMIA DUE TO CHRONIC KIDNEY DISEASE, ON CHRONIC DIALYSIS: ICD-10-CM

## 2022-10-11 DIAGNOSIS — Z76.82 PATIENT ON WAITING LIST FOR ORGAN TRANSPLANT: Primary | ICD-10-CM

## 2022-10-11 DIAGNOSIS — N25.81 SECONDARY HYPERPARATHYROIDISM: ICD-10-CM

## 2022-10-11 DIAGNOSIS — E10.22 TYPE 1 DIABETES MELLITUS WITH END-STAGE RENAL DISEASE (ESRD): Chronic | ICD-10-CM

## 2022-10-11 DIAGNOSIS — D63.1 ANEMIA DUE TO CHRONIC KIDNEY DISEASE, ON CHRONIC DIALYSIS: ICD-10-CM

## 2022-10-11 DIAGNOSIS — I15.0 RENOVASCULAR HYPERTENSION: ICD-10-CM

## 2022-10-11 DIAGNOSIS — N18.6 ANEMIA DUE TO CHRONIC KIDNEY DISEASE, ON CHRONIC DIALYSIS: ICD-10-CM

## 2022-10-11 PROCEDURE — 3066F NEPHROPATHY DOC TX: CPT | Mod: CPTII,S$GLB,TXP, | Performed by: NURSE PRACTITIONER

## 2022-10-11 PROCEDURE — 99999 PR PBB SHADOW E&M-EST. PATIENT-LVL IV: CPT | Mod: PBBFAC,TXP,, | Performed by: NURSE PRACTITIONER

## 2022-10-11 PROCEDURE — 1159F MED LIST DOCD IN RCRD: CPT | Mod: CPTII,S$GLB,TXP, | Performed by: NURSE PRACTITIONER

## 2022-10-11 PROCEDURE — 1160F PR REVIEW ALL MEDS BY PRESCRIBER/CLIN PHARMACIST DOCUMENTED: ICD-10-PCS | Mod: CPTII,S$GLB,TXP, | Performed by: NURSE PRACTITIONER

## 2022-10-11 PROCEDURE — 3066F PR DOCUMENTATION OF TREATMENT FOR NEPHROPATHY: ICD-10-PCS | Mod: CPTII,S$GLB,TXP, | Performed by: NURSE PRACTITIONER

## 2022-10-11 PROCEDURE — 99499 RISK ADDL DX/OHS AUDIT: ICD-10-PCS | Mod: S$GLB,,, | Performed by: NURSE PRACTITIONER

## 2022-10-11 PROCEDURE — 3080F DIAST BP >= 90 MM HG: CPT | Mod: CPTII,S$GLB,TXP, | Performed by: NURSE PRACTITIONER

## 2022-10-11 PROCEDURE — 3046F HEMOGLOBIN A1C LEVEL >9.0%: CPT | Mod: CPTII,S$GLB,TXP, | Performed by: NURSE PRACTITIONER

## 2022-10-11 PROCEDURE — 99213 PR OFFICE/OUTPT VISIT, EST, LEVL III, 20-29 MIN: ICD-10-PCS | Mod: S$GLB,TXP,, | Performed by: NURSE PRACTITIONER

## 2022-10-11 PROCEDURE — 1159F PR MEDICATION LIST DOCUMENTED IN MEDICAL RECORD: ICD-10-PCS | Mod: CPTII,S$GLB,TXP, | Performed by: NURSE PRACTITIONER

## 2022-10-11 PROCEDURE — 99499 UNLISTED E&M SERVICE: CPT | Mod: S$GLB,,, | Performed by: NURSE PRACTITIONER

## 2022-10-11 PROCEDURE — 99213 OFFICE O/P EST LOW 20 MIN: CPT | Mod: S$GLB,TXP,, | Performed by: NURSE PRACTITIONER

## 2022-10-11 PROCEDURE — 99999 PR PBB SHADOW E&M-EST. PATIENT-LVL IV: ICD-10-PCS | Mod: PBBFAC,TXP,, | Performed by: NURSE PRACTITIONER

## 2022-10-11 PROCEDURE — 4010F PR ACE/ARB THEARPY RXD/TAKEN: ICD-10-PCS | Mod: CPTII,S$GLB,TXP, | Performed by: NURSE PRACTITIONER

## 2022-10-11 PROCEDURE — 4010F ACE/ARB THERAPY RXD/TAKEN: CPT | Mod: CPTII,S$GLB,TXP, | Performed by: NURSE PRACTITIONER

## 2022-10-11 PROCEDURE — 3046F PR MOST RECENT HEMOGLOBIN A1C LEVEL > 9.0%: ICD-10-PCS | Mod: CPTII,S$GLB,TXP, | Performed by: NURSE PRACTITIONER

## 2022-10-11 PROCEDURE — 1160F RVW MEDS BY RX/DR IN RCRD: CPT | Mod: CPTII,S$GLB,TXP, | Performed by: NURSE PRACTITIONER

## 2022-10-11 PROCEDURE — 3077F SYST BP >= 140 MM HG: CPT | Mod: CPTII,S$GLB,TXP, | Performed by: NURSE PRACTITIONER

## 2022-10-11 PROCEDURE — 3077F PR MOST RECENT SYSTOLIC BLOOD PRESSURE >= 140 MM HG: ICD-10-PCS | Mod: CPTII,S$GLB,TXP, | Performed by: NURSE PRACTITIONER

## 2022-10-11 PROCEDURE — 3080F PR MOST RECENT DIASTOLIC BLOOD PRESSURE >= 90 MM HG: ICD-10-PCS | Mod: CPTII,S$GLB,TXP, | Performed by: NURSE PRACTITIONER

## 2022-10-11 NOTE — LETTER
October 13, 2022        Tai Camilo  [address]  [facility] [address]  Phone: [phone]  Fax: [phone]             [facility] Hern[facility]dez- Transplant 1st Fl  [address]  [facility] [address]  Phone: [phone]   Patient: Isabela Read   MR Number: [id number]   YOB: 1995   Date of Visit: 10/11/2022       Dear Dr. Tai Camilo    Thank you for referring Isabela Read to me for evaluation. Attached you will find relevant portions of my assessment and plan of care.    If you have questions, please do not hesitate to call me. I look forward to following Isabela Read along with you.    Sincerely,    Soraida Hollis, NP    Enclosure    If you would like to receive this communication electronically, please contact [email or web address] or [phone] to request [employer] access.    [employer] is a tool which provides read-only access to select patient information with whom you have a relationship. Its easy to use and provides real time access to review your patients record including encounter summaries, notes, results, and demographic information.    If you feel you have received this communication in error or would no longer like to receive these types of communications, please e-mail [email or web address]

## 2022-10-11 NOTE — PROGRESS NOTES
Kidney/Pancreas Transplant Recipient Reevalulation    Referring Physician: Oswald Kruger  Current Nephrologist: Tai Camilo  Waitlist Status: active  Dialysis Start Date: 5/31/2021    Subjective:     CC:  Six month reassessment of kidney transplant candidacy.    HPI:  Ms. Read is a 27 y.o. year old Black or  female with ESRD secondary to diabetic nephropathy and HTN.  She has been on the wait list for a kidney transplant at [facility] since 5/31/2021. Patient is currently on hemodialysis started on 5/31/21. Patient is dialyzing on TTS schedule.  Patient reports that she is tolerating dialysis well.. She has a LUE AV fistula. She is dialyzing  4 ours per session. Dry weight 61 kg. She is scheduled for an extra HD tx this week.     Last seen by txp clinic on 8/5/22     Recent hospitalizations or ED visits. 9/14/22 Hospitalized for volume overload / pulmonary edema    Fx reassessment: Pt is tired, she had 4 hours HD this AM . She does not appear frail.      9/14/22 CXR  Mediastinal structures are midline.  Normal mediastinal and hilar contours.  Normal size cardiac silhouette.  Lungs are symmetrically expanded.  Diffuse interstitial and patchy airspace opacities throughout both lungs, more prominent within the middle and lower lung zones.  Findings are nonspecific, likely represents prominent pulmonary edema.  Infectious/inflammatory process or other airspace disease such as hemorrhage not excluded.  Recommend clinical correlation.  No pneumothorax.  No significant pleural fluid.Osseous structures appear intact.     CT chest  9/14/22  1. There is a questionable pulmonary arterial filling defect within a segmental branch to the left upper lobe versus artifact from motion and volume averaging.  Motion artifact/volume averaging favored.  No thromboembolism seen elsewhere.  Correlation of this finding with D-dimer and/or lower extremity ultrasound as warranted.  2. Multifocal patchy ground-glass  attenuation throughout the pulmonary parenchyma concerning for multifocal infectious or inflammatory process.  There is suspected superimposed pulmonary edema.  In comparison to examination 07/27/2022, findings have worsened since that time.  There is a small associated right pleural effusion, new since the previous exam.  3. Additional findings above.       Lab /diagnostic results / wk up reviewed     Pulmonary follow up    8/2/22 12:06 PM  CT chest personally reviewed. There is improvement in GGOs bilaterally. Her prior CTA during admit likely was reflective of volume overload in setting of flash pulm edema. From pulmonary perspective, no objection with proceeding forward with renal transplant.         9/7/22 Breast US (-)  9/7/22 MMG (-)  5/9/22 ABD US Hepatomegaly, Right kidney appears echogenic similar to ultrasound 03/04/2021 and is suggestive of medical renal disease.  5/26/21 hepatomegaly: Cleared from hepatology for kidney transplant  2/18/2019 PAP (-)  4/6/21 iliac doppler study: iliac arteries demonstrates triphasic flow bilaterally   4/6/21 PXR favorable-->Some internal iliac arterial calcification is again seen in the pelvis, as is rather extensive femoral arterial calcification in the soft tissues of both thighs      Transthoracic echo (TTE) complete      8/5/22:The left ventricle is normal in size with concentric remodeling and normal systolic function. The estimated ejection fraction is 55%.  Normal right ventricular size with normal right ventricular systolic function.  Indeterminate left ventricular diastolic function.  Mild left atrial enlargement.  Normal central venous pressure (3 mmHg     Multi Study      6/7/21 Conclusion   Normal myocardial perfusion scan. There is no evidence of myocardial ischemia or infarction.   The gated perfusion images showed an ejection fraction of 65% at rest. The gated perfusion images showed an ejection fraction of 64% post stress. Normal ejection fraction is  greater than 51%.   There is normal wall motion at rest and post stress.   LV cavity size is normal at rest and normal at stress.   The EKG portion of this study is negative for ischemia.   The patient reported no chest pain during the stress test.  There were no arrhythmias during stress.    There are no prior studies for comparison.     . COLONOSCOPY      3/16/22  Impression:  -The examined portion of the ilem was normal.   -The examine was otherwise normal on direct and retroflexion views.   -No specimens collected.  -Repeat colonoscopy at age 50 for surveillance.      Past Medical History:   Diagnosis Date    Acute kidney injury superimposed on chronic kidney disease 4/7/2021    Anemia     Chronic hypertension with exacerbation during pregnancy in second trimester 11/6/2020    Current regimen (11/6/20):  - Carvedilol 12.5 mg BID - Nifedipine 30 mg daily Baseline CKD + proteinuria    Chronic kidney disease     Depression     Diabetes mellitus     Diabetic retinopathy     Diarrhea     Encounter for blood transfusion     Gastroparesis     Glaucoma     Hepatomegaly 4/29/2021    Hx of psychiatric care     Hyperlipidemia     Hypertension     Nephrotic syndrome     Nephrotic syndrome 3/4/2021    Palpitations     Poor fetal growth affecting management of mother in second trimester 10/15/2020    Restrictive lung disease     Retinal detachment     Severe pre-eclampsia in second trimester 11/6/2020       Review of Systems   Constitutional:  Negative for activity change, appetite change, chills, fatigue, fever and unexpected weight change.   HENT:  Negative for congestion, facial swelling, postnasal drip, rhinorrhea, sinus pressure, sore throat and trouble swallowing.    Eyes:  Positive for visual disturbance (visual disturbance (decreased vision --right eye--retinal surgery in 5/31/22).). Negative for pain and redness.   Respiratory:  Negative for cough, chest tightness, shortness of breath and wheezing.    Cardiovascular:  "Negative.  Negative for chest pain, palpitations and leg swelling.   Gastrointestinal:  Negative for abdominal pain, diarrhea, nausea and vomiting.        DM 1  wears continuous glucose monitor   On insulin   Hx gastroparesis    Genitourinary:  Positive for decreased urine volume (decreased urine volume (voids ~ 1-2 x/day)). Negative for dysuria, flank pain and urgency.   Musculoskeletal:  Negative for gait problem, neck pain and neck stiffness.   Skin:  Negative for rash.   Allergic/Immunologic: Negative for environmental allergies, food allergies and immunocompromised state.   Neurological:  Negative for dizziness, weakness, light-headedness and headaches.   Psychiatric/Behavioral:  Negative for agitation and confusion. The patient is not nervous/anxious.         Hx depression     Objective:   body mass index is 24.29 kg/m².  BP (!) 173/94 (BP Location: Right arm, Patient Position: Sitting, BP Method: Medium (Automatic))   Pulse 106   Temp 97.3 °F (36.3 °C) (Temporal)   Resp 16   Ht 5' 4.5" (1.638 m)   Wt 65.2 kg (143 lb 11.8 oz)   LMP 09/14/2022   SpO2 98%   BMI 24.29 kg/m²     Physical Exam  Vitals reviewed.   Constitutional:       Appearance: Normal appearance. She is well-developed.   HENT:      Head: Normocephalic.   Eyes:      Pupils: Pupils are equal, round, and reactive to light.   Cardiovascular:      Rate and Rhythm: Normal rate and regular rhythm.      Heart sounds: Normal heart sounds.   Pulmonary:      Effort: Pulmonary effort is normal.      Breath sounds: Normal breath sounds.   Abdominal:      General: Bowel sounds are normal.      Palpations: Abdomen is soft.   Musculoskeletal:         General: Normal range of motion.        Arms:       Cervical back: Normal range of motion and neck supple.      Comments: Glucometer    Skin:     General: Skin is warm and dry.   Neurological:      Mental Status: She is alert and oriented to person, place, and time.      Motor: No abnormal muscle tone. "   Psychiatric:         Behavior: Behavior normal.       Labs:  Lab Results   Component Value Date    WBC 5.96 09/15/2022    HGB 11.0 (L) 09/15/2022    HCT 34.2 (L) 09/15/2022     (L) 09/15/2022    K 5.1 09/15/2022    CL 97 09/15/2022    CO2 21 (L) 09/15/2022    BUN 28 (H) 09/15/2022    CREATININE 6.7 (H) 09/15/2022    EGFRNONAA 9.1 (A) 05/25/2022    CALCIUM 8.9 09/15/2022    PHOS 5.1 (H) 09/15/2022    MG 2.3 09/15/2022    ALBUMIN 3.4 (L) 09/14/2022    AST 30 09/14/2022    ALT 13 09/14/2022    UTPCR 10.82 (H) 03/05/2021    .0 (H) 04/06/2021       Lab Results   Component Value Date    BILIRUBINUA Negative 05/19/2021    PROTEINUA 3+ (A) 05/19/2021    NITRITE Negative 05/19/2021    RBCUA 21 (H) 05/19/2021    WBCUA 52 (H) 05/19/2021       No results found for: HLAABCTYPE    Lab Results   Component Value Date    CPRA 0 08/05/2022    CPRA 0 08/05/2022    CPRA 0 08/05/2022    RG4NDRX Negative 08/05/2022    CIABCLM INCONCLUSIVE 08/05/2022    ABCMT INCONCLUSIVE 08/05/2022    ABCMT NONE 08/05/2022       Labs were reviewed with the patient.    Pre-transplant Workup:   Reviewed with the patient.    Assessment:     1. Patient on waiting list for organ transplant    2. Anemia due to chronic kidney disease, on chronic dialysis    3. Renovascular hypertension    4. Type 1 diabetes mellitus with end-stage renal disease (ESRD)    5. Secondary hyperparathyroidism        Plan:   Reminder when booking txp and clinic apts; Patient is on a HD schedule of TTS  Repeat PFTs  not done--Pt had HD this AM --clarify if PFTs are needed  Cardiac stress test per guidelines   Repeat CXR to assess resolution pulm edema    Transplant Candidacy:   Ms. Read is a suitable kidney and pancreas transplant candidate.  Meets center eligibility for accepting HCV+ donor offer - yes.  Patient educated on HCV+ donors. Isabela is willing  to accept HCV+ donor offer -  yes   Patient is a candidate for KDPI > 85 kidney donor offer - no d//t age and  K/P  She remains in overall stable health, and will remain active on the transplant list.    Patient advised that it is recommended that all transplant candidates, and their close contacts and household members receive Covid vaccination.    Soraida Hollis NP       Follow-up:   In addition to the tests noted in the plan, Ms. Read will continue to have reevaluation as per the standing pre-kidney transplant protocol:  Monthly blood for PRA  Annual return to clinic, except HIV positive, > 65 years of age, or pancreas transplant candidates who will be scheduled to see transplant every 6 months while in pre-transplant phase  Annual re-testing: CXR, EKG, yearly mammograms for women over 40 and PSA for males over 40, cardiology follow-up as recommended by initial cardiology pre-transplant evaluation  Renal ultrasound every 2 years  Baseline colonoscopy after age 50 and repeated as recommended    UNOS Patient Status  Functional Status: 60% - Requires occasional assistance but is able to care for needs  Physical Capacity: No Limitations

## 2022-10-12 DIAGNOSIS — Z76.82 ORGAN TRANSPLANT CANDIDATE: Primary | ICD-10-CM

## 2022-10-13 NOTE — Clinical Note
Thank you for sending Ms. Read back to clinic.   Made a few adjustments to her meal time insulin based on her most recent tracings. Her A1C is not accurate. May be related to low turnover (?). I am going to keep a close eye on her tracings and repeat her A1C and pair it with a fructosamine level.   Not sure how you feel about levemir or lantus. Let me know.   Thank you again,   SD  no

## 2022-10-23 NOTE — TELEPHONE ENCOUNTER
----- Message from Cb Mcghee MD sent at 2/25/2019 12:49 PM CST -----  Regarding: Endocrine  Can you contact Endocrine and try to get her a sooner appointment?  Thanks.   Yes

## 2022-10-31 ENCOUNTER — OFFICE VISIT (OUTPATIENT)
Dept: OPHTHALMOLOGY | Facility: CLINIC | Age: 27
End: 2022-10-31
Payer: MEDICARE

## 2022-10-31 DIAGNOSIS — E10.3531: Primary | ICD-10-CM

## 2022-10-31 DIAGNOSIS — H27.01 APHAKIA, RIGHT: ICD-10-CM

## 2022-10-31 DIAGNOSIS — E10.3592 TYPE 1 DIABETES MELLITUS WITH PROLIFERATIVE RETINOPATHY OF LEFT EYE WITHOUT MACULAR EDEMA: ICD-10-CM

## 2022-10-31 PROCEDURE — 4010F ACE/ARB THERAPY RXD/TAKEN: CPT | Mod: CPTII,S$GLB,, | Performed by: OPHTHALMOLOGY

## 2022-10-31 PROCEDURE — 99999 PR PBB SHADOW E&M-EST. PATIENT-LVL III: CPT | Mod: PBBFAC,,, | Performed by: OPHTHALMOLOGY

## 2022-10-31 PROCEDURE — 1160F PR REVIEW ALL MEDS BY PRESCRIBER/CLIN PHARMACIST DOCUMENTED: ICD-10-PCS | Mod: CPTII,S$GLB,, | Performed by: OPHTHALMOLOGY

## 2022-10-31 PROCEDURE — 92014 PR EYE EXAM, EST PATIENT,COMPREHESV: ICD-10-PCS | Mod: S$GLB,,, | Performed by: OPHTHALMOLOGY

## 2022-10-31 PROCEDURE — 92014 COMPRE OPH EXAM EST PT 1/>: CPT | Mod: S$GLB,,, | Performed by: OPHTHALMOLOGY

## 2022-10-31 PROCEDURE — 1159F PR MEDICATION LIST DOCUMENTED IN MEDICAL RECORD: ICD-10-PCS | Mod: CPTII,S$GLB,, | Performed by: OPHTHALMOLOGY

## 2022-10-31 PROCEDURE — 3046F HEMOGLOBIN A1C LEVEL >9.0%: CPT | Mod: CPTII,S$GLB,, | Performed by: OPHTHALMOLOGY

## 2022-10-31 PROCEDURE — 92134 CPTRZ OPH DX IMG PST SGM RTA: CPT | Mod: S$GLB,,, | Performed by: OPHTHALMOLOGY

## 2022-10-31 PROCEDURE — 3066F PR DOCUMENTATION OF TREATMENT FOR NEPHROPATHY: ICD-10-PCS | Mod: CPTII,S$GLB,, | Performed by: OPHTHALMOLOGY

## 2022-10-31 PROCEDURE — 92134 POSTERIOR SEGMENT OCT RETINA (OCULAR COHERENCE TOMOGRAPHY)-BOTH EYES: ICD-10-PCS | Mod: S$GLB,,, | Performed by: OPHTHALMOLOGY

## 2022-10-31 PROCEDURE — 3046F PR MOST RECENT HEMOGLOBIN A1C LEVEL > 9.0%: ICD-10-PCS | Mod: CPTII,S$GLB,, | Performed by: OPHTHALMOLOGY

## 2022-10-31 PROCEDURE — 4010F PR ACE/ARB THEARPY RXD/TAKEN: ICD-10-PCS | Mod: CPTII,S$GLB,, | Performed by: OPHTHALMOLOGY

## 2022-10-31 PROCEDURE — 1159F MED LIST DOCD IN RCRD: CPT | Mod: CPTII,S$GLB,, | Performed by: OPHTHALMOLOGY

## 2022-10-31 PROCEDURE — 1160F RVW MEDS BY RX/DR IN RCRD: CPT | Mod: CPTII,S$GLB,, | Performed by: OPHTHALMOLOGY

## 2022-10-31 PROCEDURE — 99999 PR PBB SHADOW E&M-EST. PATIENT-LVL III: ICD-10-PCS | Mod: PBBFAC,,, | Performed by: OPHTHALMOLOGY

## 2022-10-31 PROCEDURE — 3066F NEPHROPATHY DOC TX: CPT | Mod: CPTII,S$GLB,, | Performed by: OPHTHALMOLOGY

## 2022-10-31 NOTE — PROGRESS NOTES
Subjective:       Patient ID: Isabela Read is a 27 y.o. female      Chief Complaint   Patient presents with    Diabetic Eye Exam     History of Present Illness  HPI    1 mo  Comp Ex /OCT/IOL OU    DLS 09/19/2022  by Dr. ARMIN Montaño MD    Cc: pt report no new changes   Va OD is very blurry.  Can see objects and colors OD.  Va OS good       -eye pain   -diplopia  -headaches  -flashes/floaters  -curtains/shadows/veils                    Eye Meds : NONE           POHx:   1. Type 1 DM OD w/ PDR and TRD involving macula  S/P PPV , Removal of silicone oil, injection of 14% C3F8 gas OD 02/01/2022      Last edited by Maximilian Montaño MD on 10/31/2022  5:19 PM.        Imaging:    See report    Assessment/Plan:     1. 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, associated with type 1 diabetes mellitus  Aphakic with SO  Doing well  Macula now attached  No active prolif  Has inf fibrosis without TRD     Discussed option of SOR, poss sec IOL vs obs and RBA  Risk of needing to replace SO but good time to try if pt desires  Visual potential unclear  Pt elects observation for now    IOL master for aphakic with SO OD, MA60AC was done today    2. Type 1 diabetes mellitus with proliferative retinopathy of left eye without macular edema  Doing well  S/p PRP  Observe    Diabetic Retinopathy discussed in detail, all questions answered  Stressed importance of good BS/BP/Chol Control  RTC immediately PRN any vision changes, laure blurry vision, missing vision, floaters, distortions, etc    Follow up in about 2 months (around 12/31/2022), or if symptoms worsen or fail to improve, for Comprehensive Examination, OCT Mac.

## 2022-11-01 ENCOUNTER — TELEPHONE (OUTPATIENT)
Dept: TRANSPLANT | Facility: CLINIC | Age: 27
End: 2022-11-01
Payer: MEDICARE

## 2022-11-02 ENCOUNTER — ANESTHESIA EVENT (OUTPATIENT)
Dept: SURGERY | Facility: HOSPITAL | Age: 27
DRG: 019 | End: 2022-11-02
Payer: MEDICARE

## 2022-11-02 ENCOUNTER — ANESTHESIA (OUTPATIENT)
Dept: SURGERY | Facility: HOSPITAL | Age: 27
DRG: 019 | End: 2022-11-02
Payer: MEDICARE

## 2022-11-02 ENCOUNTER — TELEPHONE (OUTPATIENT)
Dept: TRANSPLANT | Facility: CLINIC | Age: 27
End: 2022-11-02
Payer: MEDICARE

## 2022-11-02 ENCOUNTER — HOSPITAL ENCOUNTER (INPATIENT)
Facility: HOSPITAL | Age: 27
LOS: 7 days | Discharge: HOME | DRG: 019 | End: 2022-11-09
Attending: TRANSPLANT SURGERY | Admitting: TRANSPLANT SURGERY
Payer: MEDICARE

## 2022-11-02 DIAGNOSIS — I10 HYPERTENSION, ESSENTIAL: ICD-10-CM

## 2022-11-02 DIAGNOSIS — Z76.82 PATIENT ON WAITING LIST FOR KIDNEY TRANSPLANT: Chronic | ICD-10-CM

## 2022-11-02 DIAGNOSIS — Z01.818 PRE-OP EVALUATION: ICD-10-CM

## 2022-11-02 DIAGNOSIS — Z29.89 PROPHYLACTIC IMMUNOTHERAPY: ICD-10-CM

## 2022-11-02 DIAGNOSIS — N18.6 END STAGE RENAL FAILURE ON DIALYSIS: ICD-10-CM

## 2022-11-02 DIAGNOSIS — Z99.2 END STAGE RENAL FAILURE ON DIALYSIS: ICD-10-CM

## 2022-11-02 DIAGNOSIS — R00.0 TACHYCARDIA: ICD-10-CM

## 2022-11-02 DIAGNOSIS — E10.10 TYPE 1 DIABETES MELLITUS WITH KETOACIDOSIS WITHOUT COMA: ICD-10-CM

## 2022-11-02 DIAGNOSIS — D62 ACUTE BLOOD LOSS ANEMIA: ICD-10-CM

## 2022-11-02 DIAGNOSIS — Z94.83 STATUS POST SIMULTANEOUS KIDNEY AND PANCREAS TRANSPLANT: Primary | ICD-10-CM

## 2022-11-02 DIAGNOSIS — Z79.60 LONG-TERM USE OF IMMUNOSUPPRESSANT MEDICATION: ICD-10-CM

## 2022-11-02 DIAGNOSIS — Z94.0 STATUS POST SIMULTANEOUS KIDNEY AND PANCREAS TRANSPLANT: Primary | ICD-10-CM

## 2022-11-02 DIAGNOSIS — R33.9 URINARY RETENTION: ICD-10-CM

## 2022-11-02 DIAGNOSIS — Z91.89 AT RISK FOR OPPORTUNISTIC INFECTIONS: ICD-10-CM

## 2022-11-02 DIAGNOSIS — Z01.818 PRE-TRANSPLANT EVALUATION FOR KIDNEY AND PANCREAS TRANSPLANT: ICD-10-CM

## 2022-11-02 DIAGNOSIS — E10.22 TYPE 1 DIABETES MELLITUS WITH END-STAGE RENAL DISEASE (ESRD): Chronic | ICD-10-CM

## 2022-11-02 DIAGNOSIS — D63.8 ANEMIA OF CHRONIC DISEASE: ICD-10-CM

## 2022-11-02 DIAGNOSIS — N18.6 TYPE 1 DIABETES MELLITUS WITH END-STAGE RENAL DISEASE (ESRD): Chronic | ICD-10-CM

## 2022-11-02 LAB
25(OH)D3+25(OH)D2 SERPL-MCNC: 13 NG/ML (ref 30–96)
ABO + RH BLD: NORMAL
ALBUMIN SERPL BCP-MCNC: 4 G/DL (ref 3.5–5.2)
ALP SERPL-CCNC: 73 U/L (ref 55–135)
ALT SERPL W/O P-5'-P-CCNC: 16 U/L (ref 10–44)
AMYLASE SERPL-CCNC: 108 U/L (ref 20–110)
ANION GAP SERPL CALC-SCNC: 14 MMOL/L (ref 8–16)
APTT BLDCRRT: 27.2 SEC (ref 21–32)
AST SERPL-CCNC: 19 U/L (ref 10–40)
BASOPHILS # BLD AUTO: 0.03 K/UL (ref 0–0.2)
BASOPHILS NFR BLD: 0.5 % (ref 0–1.9)
BILIRUB SERPL-MCNC: 0.7 MG/DL (ref 0.1–1)
BLD GP AB SCN CELLS X3 SERPL QL: NORMAL
BUN SERPL-MCNC: 40 MG/DL (ref 6–20)
CALCIUM SERPL-MCNC: 9.2 MG/DL (ref 8.7–10.5)
CHLORIDE SERPL-SCNC: 93 MMOL/L (ref 95–110)
CHOLEST SERPL-MCNC: 152 MG/DL (ref 120–199)
CHOLEST/HDLC SERPL: 2.4 {RATIO} (ref 2–5)
CO2 SERPL-SCNC: 26 MMOL/L (ref 23–29)
CREAT SERPL-MCNC: 6.5 MG/DL (ref 0.5–1.4)
DIFFERENTIAL METHOD: ABNORMAL
EOSINOPHIL # BLD AUTO: 0.2 K/UL (ref 0–0.5)
EOSINOPHIL NFR BLD: 3.3 % (ref 0–8)
ERYTHROCYTE [DISTWIDTH] IN BLOOD BY AUTOMATED COUNT: 14.3 % (ref 11.5–14.5)
EST. GFR  (NO RACE VARIABLE): 8.4 ML/MIN/1.73 M^2
ESTIMATED AVG GLUCOSE: 272 MG/DL (ref 68–131)
GLUCOSE SERPL-MCNC: 137 MG/DL (ref 70–110)
HBA1C MFR BLD: 11.1 % (ref 4–5.6)
HBV CORE AB SERPL QL IA: NORMAL
HBV SURFACE AG SERPL QL IA: NORMAL
HCG INTACT+B SERPL-ACNC: <2.4 MIU/ML
HCT VFR BLD AUTO: 30 % (ref 37–48.5)
HCV AB SERPL QL IA: NORMAL
HCV RNA SERPL QL NAA+PROBE: NOT DETECTED
HCV RNA SPEC NAA+PROBE-ACNC: <12 IU/ML
HDLC SERPL-MCNC: 64 MG/DL (ref 40–75)
HDLC SERPL: 42.1 % (ref 20–50)
HGB BLD-MCNC: 9.9 G/DL (ref 12–16)
HIV 1+2 AB+HIV1 P24 AG SERPL QL IA: NORMAL
IMM GRANULOCYTES # BLD AUTO: 0.01 K/UL (ref 0–0.04)
IMM GRANULOCYTES NFR BLD AUTO: 0.2 % (ref 0–0.5)
INR PPP: 1.1 (ref 0.8–1.2)
LDH SERPL L TO P-CCNC: 226 U/L (ref 110–260)
LDLC SERPL CALC-MCNC: 73.4 MG/DL (ref 63–159)
LIPASE SERPL-CCNC: 118 U/L (ref 4–60)
LYMPHOCYTES # BLD AUTO: 2 K/UL (ref 1–4.8)
LYMPHOCYTES NFR BLD: 32.3 % (ref 18–48)
MAGNESIUM SERPL-MCNC: 2.3 MG/DL (ref 1.6–2.6)
MCH RBC QN AUTO: 32.6 PG (ref 27–31)
MCHC RBC AUTO-ENTMCNC: 33 G/DL (ref 32–36)
MCV RBC AUTO: 99 FL (ref 82–98)
MONOCYTES # BLD AUTO: 0.6 K/UL (ref 0.3–1)
MONOCYTES NFR BLD: 9.4 % (ref 4–15)
NEUTROPHILS # BLD AUTO: 3.4 K/UL (ref 1.8–7.7)
NEUTROPHILS NFR BLD: 54.3 % (ref 38–73)
NONHDLC SERPL-MCNC: 88 MG/DL
NRBC BLD-RTO: 0 /100 WBC
PHOSPHATE SERPL-MCNC: 4.8 MG/DL (ref 2.7–4.5)
PLATELET # BLD AUTO: 211 K/UL (ref 150–450)
PMV BLD AUTO: 12.7 FL (ref 9.2–12.9)
POCT GLUCOSE: 100 MG/DL (ref 70–110)
POCT GLUCOSE: 151 MG/DL (ref 70–110)
POCT GLUCOSE: 151 MG/DL (ref 70–110)
POCT GLUCOSE: 155 MG/DL (ref 70–110)
POCT GLUCOSE: 165 MG/DL (ref 70–110)
POCT GLUCOSE: 167 MG/DL (ref 70–110)
POCT GLUCOSE: 184 MG/DL (ref 70–110)
POCT GLUCOSE: 220 MG/DL (ref 70–110)
POCT GLUCOSE: 292 MG/DL (ref 70–110)
POCT GLUCOSE: 313 MG/DL (ref 70–110)
POCT GLUCOSE: 371 MG/DL (ref 70–110)
POCT GLUCOSE: 403 MG/DL (ref 70–110)
POCT GLUCOSE: 405 MG/DL (ref 70–110)
POCT GLUCOSE: 427 MG/DL (ref 70–110)
POCT GLUCOSE: 58 MG/DL (ref 70–110)
POCT GLUCOSE: 70 MG/DL (ref 70–110)
POTASSIUM SERPL-SCNC: 4.8 MMOL/L (ref 3.5–5.1)
POTASSIUM SERPL-SCNC: 5.5 MMOL/L (ref 3.5–5.1)
PROT SERPL-MCNC: 8.5 G/DL (ref 6–8.4)
PROTHROMBIN TIME: 11 SEC (ref 9–12.5)
PTH-INTACT SERPL-MCNC: 765.7 PG/ML (ref 9–77)
RBC # BLD AUTO: 3.04 M/UL (ref 4–5.4)
SARS-COV-2 RDRP RESP QL NAA+PROBE: NEGATIVE
SODIUM SERPL-SCNC: 133 MMOL/L (ref 136–145)
TRIGL SERPL-MCNC: 73 MG/DL (ref 30–150)
URATE SERPL-MCNC: 4.1 MG/DL (ref 2.4–5.7)
WBC # BLD AUTO: 6.29 K/UL (ref 3.9–12.7)

## 2022-11-02 PROCEDURE — 36620 ARTERIAL: ICD-10-PCS | Mod: 59,,, | Performed by: ANESTHESIOLOGY

## 2022-11-02 PROCEDURE — 48554 TRANSPL ALLOGRAFT PANCREAS: CPT | Mod: ,,, | Performed by: TRANSPLANT SURGERY

## 2022-11-02 PROCEDURE — 48554 PR TRANSPLANT ALLOGRAFT PANCREAS: ICD-10-PCS | Mod: 82,,, | Performed by: TRANSPLANT SURGERY

## 2022-11-02 PROCEDURE — 84100 ASSAY OF PHOSPHORUS: CPT | Mod: NTX | Performed by: CLINICAL NURSE SPECIALIST

## 2022-11-02 PROCEDURE — 80061 LIPID PANEL: CPT | Mod: NTX | Performed by: CLINICAL NURSE SPECIALIST

## 2022-11-02 PROCEDURE — 85025 COMPLETE CBC W/AUTO DIFF WBC: CPT | Mod: NTX | Performed by: CLINICAL NURSE SPECIALIST

## 2022-11-02 PROCEDURE — 93005 ELECTROCARDIOGRAM TRACING: CPT | Mod: NTX

## 2022-11-02 PROCEDURE — 84702 CHORIONIC GONADOTROPIN TEST: CPT | Mod: NTX | Performed by: CLINICAL NURSE SPECIALIST

## 2022-11-02 PROCEDURE — 50323 PR TRANSPLANT,PREP CADAVER RENAL GRAFT: ICD-10-PCS | Mod: 51,RT,, | Performed by: TRANSPLANT SURGERY

## 2022-11-02 PROCEDURE — 27200676 HC TRANSDUCER MONITOR KIT DOUBLE: Mod: NTX | Performed by: ANESTHESIOLOGY

## 2022-11-02 PROCEDURE — 37000008 HC ANESTHESIA 1ST 15 MINUTES: Performed by: TRANSPLANT SURGERY

## 2022-11-02 PROCEDURE — 93010 EKG 12-LEAD: ICD-10-PCS | Mod: NTX,,, | Performed by: INTERNAL MEDICINE

## 2022-11-02 PROCEDURE — U0002 COVID-19 LAB TEST NON-CDC: HCPCS | Mod: NTX | Performed by: CLINICAL NURSE SPECIALIST

## 2022-11-02 PROCEDURE — 50605 INSERT URETERAL SUPPORT: CPT | Mod: 51,RT,, | Performed by: TRANSPLANT SURGERY

## 2022-11-02 PROCEDURE — 86920 COMPATIBILITY TEST SPIN: CPT | Performed by: STUDENT IN AN ORGANIZED HEALTH CARE EDUCATION/TRAINING PROGRAM

## 2022-11-02 PROCEDURE — 27201423 OPTIME MED/SURG SUP & DEVICES STERILE SUPPLY: Performed by: TRANSPLANT SURGERY

## 2022-11-02 PROCEDURE — 63600175 PHARM REV CODE 636 W HCPCS: Mod: NTX | Performed by: CLINICAL NURSE SPECIALIST

## 2022-11-02 PROCEDURE — 50360 RNL ALTRNSPLJ W/O RCP NFRCT: CPT | Mod: 82,RT,, | Performed by: TRANSPLANT SURGERY

## 2022-11-02 PROCEDURE — 36000931 HC OR TIME LEV VII EA ADD 15 MIN: Performed by: TRANSPLANT SURGERY

## 2022-11-02 PROCEDURE — 50605 INSERT URETERAL SUPPORT: CPT | Mod: 82,RT,, | Performed by: TRANSPLANT SURGERY

## 2022-11-02 PROCEDURE — C1729 CATH, DRAINAGE: HCPCS | Performed by: TRANSPLANT SURGERY

## 2022-11-02 PROCEDURE — 82150 ASSAY OF AMYLASE: CPT | Mod: NTX | Performed by: CLINICAL NURSE SPECIALIST

## 2022-11-02 PROCEDURE — 83735 ASSAY OF MAGNESIUM: CPT | Mod: NTX | Performed by: CLINICAL NURSE SPECIALIST

## 2022-11-02 PROCEDURE — 90935 HEMODIALYSIS ONE EVALUATION: CPT | Mod: NTX

## 2022-11-02 PROCEDURE — 80053 COMPREHEN METABOLIC PANEL: CPT | Mod: NTX | Performed by: CLINICAL NURSE SPECIALIST

## 2022-11-02 PROCEDURE — 99223 1ST HOSP IP/OBS HIGH 75: CPT | Mod: 57,AI,NTX, | Performed by: CLINICAL NURSE SPECIALIST

## 2022-11-02 PROCEDURE — C1751 CATH, INF, PER/CENT/MIDLINE: HCPCS | Mod: NTX | Performed by: ANESTHESIOLOGY

## 2022-11-02 PROCEDURE — D9220A PRA ANESTHESIA: ICD-10-PCS | Mod: CRNA,,, | Performed by: NURSE ANESTHETIST, CERTIFIED REGISTERED

## 2022-11-02 PROCEDURE — 84550 ASSAY OF BLOOD/URIC ACID: CPT | Mod: NTX | Performed by: CLINICAL NURSE SPECIALIST

## 2022-11-02 PROCEDURE — 83036 HEMOGLOBIN GLYCOSYLATED A1C: CPT | Mod: NTX | Performed by: CLINICAL NURSE SPECIALIST

## 2022-11-02 PROCEDURE — 99223 PR INITIAL HOSPITAL CARE,LEVL III: ICD-10-PCS | Mod: 57,AI,NTX, | Performed by: CLINICAL NURSE SPECIALIST

## 2022-11-02 PROCEDURE — 86644 CMV ANTIBODY: CPT | Mod: NTX | Performed by: CLINICAL NURSE SPECIALIST

## 2022-11-02 PROCEDURE — 27100025 HC TUBING, SET FLUID WARMER: Mod: NTX | Performed by: ANESTHESIOLOGY

## 2022-11-02 PROCEDURE — 50360 RNL ALTRNSPLJ W/O RCP NFRCT: CPT | Mod: 51,RT,, | Performed by: TRANSPLANT SURGERY

## 2022-11-02 PROCEDURE — 83615 LACTATE (LD) (LDH) ENZYME: CPT | Mod: NTX | Performed by: TRANSPLANT SURGERY

## 2022-11-02 PROCEDURE — 36000930 HC OR TIME LEV VII 1ST 15 MIN: Performed by: TRANSPLANT SURGERY

## 2022-11-02 PROCEDURE — 93010 ELECTROCARDIOGRAM REPORT: CPT | Mod: NTX,,, | Performed by: INTERNAL MEDICINE

## 2022-11-02 PROCEDURE — 48554 TRANSPL ALLOGRAFT PANCREAS: CPT | Mod: 82,,, | Performed by: TRANSPLANT SURGERY

## 2022-11-02 PROCEDURE — 76937 US GUIDE VASCULAR ACCESS: CPT | Performed by: ANESTHESIOLOGY

## 2022-11-02 PROCEDURE — 48551 PR TRANSPLANT,PREP DONOR PANCREAS: ICD-10-PCS | Mod: 51,,, | Performed by: TRANSPLANT SURGERY

## 2022-11-02 PROCEDURE — 85610 PROTHROMBIN TIME: CPT | Mod: NTX | Performed by: CLINICAL NURSE SPECIALIST

## 2022-11-02 PROCEDURE — D9220A PRA ANESTHESIA: Mod: CRNA,,, | Performed by: NURSE ANESTHETIST, CERTIFIED REGISTERED

## 2022-11-02 PROCEDURE — 83690 ASSAY OF LIPASE: CPT | Mod: NTX | Performed by: CLINICAL NURSE SPECIALIST

## 2022-11-02 PROCEDURE — 86704 HEP B CORE ANTIBODY TOTAL: CPT | Mod: NTX | Performed by: CLINICAL NURSE SPECIALIST

## 2022-11-02 PROCEDURE — 50360 PR TRANSPLANTATION OF KIDNEY: ICD-10-PCS | Mod: 51,RT,, | Performed by: TRANSPLANT SURGERY

## 2022-11-02 PROCEDURE — 86901 BLOOD TYPING SEROLOGIC RH(D): CPT | Mod: NTX | Performed by: CLINICAL NURSE SPECIALIST

## 2022-11-02 PROCEDURE — 25000003 PHARM REV CODE 250: Performed by: NURSE ANESTHETIST, CERTIFIED REGISTERED

## 2022-11-02 PROCEDURE — 50360 PR TRANSPLANTATION OF KIDNEY: ICD-10-PCS | Mod: 82,RT,, | Performed by: TRANSPLANT SURGERY

## 2022-11-02 PROCEDURE — 50605 PR URETEROTOMY TO INSERT STENT: ICD-10-PCS | Mod: 82,RT,, | Performed by: TRANSPLANT SURGERY

## 2022-11-02 PROCEDURE — 36415 COLL VENOUS BLD VENIPUNCTURE: CPT | Mod: NTX | Performed by: PHYSICIAN ASSISTANT

## 2022-11-02 PROCEDURE — D9220A PRA ANESTHESIA: ICD-10-PCS | Mod: ANES,,, | Performed by: ANESTHESIOLOGY

## 2022-11-02 PROCEDURE — 85730 THROMBOPLASTIN TIME PARTIAL: CPT | Mod: NTX | Performed by: CLINICAL NURSE SPECIALIST

## 2022-11-02 PROCEDURE — 36415 COLL VENOUS BLD VENIPUNCTURE: CPT | Mod: NTX | Performed by: CLINICAL NURSE SPECIALIST

## 2022-11-02 PROCEDURE — 36415 COLL VENOUS BLD VENIPUNCTURE: CPT | Mod: NTX | Performed by: TRANSPLANT SURGERY

## 2022-11-02 PROCEDURE — 87340 HEPATITIS B SURFACE AG IA: CPT | Mod: NTX | Performed by: CLINICAL NURSE SPECIALIST

## 2022-11-02 PROCEDURE — 36620 INSERTION CATHETER ARTERY: CPT | Mod: 59,,, | Performed by: ANESTHESIOLOGY

## 2022-11-02 PROCEDURE — 99223 PR INITIAL HOSPITAL CARE,LEVL III: ICD-10-PCS | Mod: NTX,,, | Performed by: PHYSICIAN ASSISTANT

## 2022-11-02 PROCEDURE — 99223 1ST HOSP IP/OBS HIGH 75: CPT | Mod: NTX,,, | Performed by: PHYSICIAN ASSISTANT

## 2022-11-02 PROCEDURE — 84132 ASSAY OF SERUM POTASSIUM: CPT | Mod: NTX | Performed by: PHYSICIAN ASSISTANT

## 2022-11-02 PROCEDURE — D9220A PRA ANESTHESIA: Mod: ANES,,, | Performed by: ANESTHESIOLOGY

## 2022-11-02 PROCEDURE — 82306 VITAMIN D 25 HYDROXY: CPT | Mod: NTX | Performed by: CLINICAL NURSE SPECIALIST

## 2022-11-02 PROCEDURE — 83970 ASSAY OF PARATHORMONE: CPT | Mod: NTX | Performed by: CLINICAL NURSE SPECIALIST

## 2022-11-02 PROCEDURE — 50605 PR URETEROTOMY TO INSERT STENT: ICD-10-PCS | Mod: 51,RT,, | Performed by: TRANSPLANT SURGERY

## 2022-11-02 PROCEDURE — 48554 PR TRANSPLANT ALLOGRAFT PANCREAS: ICD-10-PCS | Mod: ,,, | Performed by: TRANSPLANT SURGERY

## 2022-11-02 PROCEDURE — 86803 HEPATITIS C AB TEST: CPT | Mod: NTX | Performed by: CLINICAL NURSE SPECIALIST

## 2022-11-02 PROCEDURE — 87389 HIV-1 AG W/HIV-1&-2 AB AG IA: CPT | Mod: NTX | Performed by: CLINICAL NURSE SPECIALIST

## 2022-11-02 PROCEDURE — 63600175 PHARM REV CODE 636 W HCPCS: Performed by: TRANSPLANT SURGERY

## 2022-11-02 PROCEDURE — 87522 HEPATITIS C REVRS TRNSCRPJ: CPT | Mod: NTX | Performed by: CLINICAL NURSE SPECIALIST

## 2022-11-02 PROCEDURE — C2617 STENT, NON-COR, TEM W/O DEL: HCPCS | Performed by: TRANSPLANT SURGERY

## 2022-11-02 PROCEDURE — 25000003 PHARM REV CODE 250: Mod: NTX | Performed by: CLINICAL NURSE SPECIALIST

## 2022-11-02 PROCEDURE — 20600001 HC STEP DOWN PRIVATE ROOM: Mod: NTX

## 2022-11-02 PROCEDURE — 37000009 HC ANESTHESIA EA ADD 15 MINS: Performed by: TRANSPLANT SURGERY

## 2022-11-02 PROCEDURE — 25000003 PHARM REV CODE 250: Mod: NTX | Performed by: INTERNAL MEDICINE

## 2022-11-02 PROCEDURE — 25000003 PHARM REV CODE 250: Mod: NTX | Performed by: PHYSICIAN ASSISTANT

## 2022-11-02 PROCEDURE — 86706 HEP B SURFACE ANTIBODY: CPT | Mod: NTX | Performed by: CLINICAL NURSE SPECIALIST

## 2022-11-02 PROCEDURE — 63600175 PHARM REV CODE 636 W HCPCS: Performed by: NURSE ANESTHETIST, CERTIFIED REGISTERED

## 2022-11-02 RX ORDER — DIPHENHYDRAMINE HYDROCHLORIDE 50 MG/ML
50 INJECTION INTRAMUSCULAR; INTRAVENOUS ONCE
Status: COMPLETED | OUTPATIENT
Start: 2022-11-02 | End: 2022-11-02

## 2022-11-02 RX ORDER — IBUPROFEN 200 MG
24 TABLET ORAL
Status: DISCONTINUED | OUTPATIENT
Start: 2022-11-02 | End: 2022-11-02

## 2022-11-02 RX ORDER — INSULIN ASPART 100 [IU]/ML
0-5 INJECTION, SOLUTION INTRAVENOUS; SUBCUTANEOUS
Status: DISCONTINUED | OUTPATIENT
Start: 2022-11-02 | End: 2022-11-02

## 2022-11-02 RX ORDER — FAMOTIDINE 10 MG/ML
INJECTION INTRAVENOUS
Status: DISCONTINUED | OUTPATIENT
Start: 2022-11-02 | End: 2022-11-03

## 2022-11-02 RX ORDER — GLUCAGON 1 MG
1 KIT INJECTION
Status: DISCONTINUED | OUTPATIENT
Start: 2022-11-02 | End: 2022-11-02

## 2022-11-02 RX ORDER — PHENYLEPHRINE HYDROCHLORIDE 10 MG/ML
INJECTION INTRAVENOUS
Status: DISCONTINUED | OUTPATIENT
Start: 2022-11-02 | End: 2022-11-03

## 2022-11-02 RX ORDER — HYDRALAZINE HYDROCHLORIDE 25 MG/1
25 TABLET, FILM COATED ORAL EVERY 8 HOURS
Status: DISCONTINUED | OUTPATIENT
Start: 2022-11-02 | End: 2022-11-05

## 2022-11-02 RX ORDER — FENTANYL CITRATE 50 UG/ML
INJECTION, SOLUTION INTRAMUSCULAR; INTRAVENOUS
Status: DISCONTINUED | OUTPATIENT
Start: 2022-11-02 | End: 2022-11-03

## 2022-11-02 RX ORDER — MUPIROCIN 20 MG/G
OINTMENT TOPICAL
Status: DISCONTINUED | OUTPATIENT
Start: 2022-11-02 | End: 2022-11-03

## 2022-11-02 RX ORDER — SODIUM CHLORIDE 9 MG/ML
INJECTION, SOLUTION INTRAVENOUS
Status: DISCONTINUED | OUTPATIENT
Start: 2022-11-02 | End: 2022-11-03

## 2022-11-02 RX ORDER — MIDAZOLAM HYDROCHLORIDE 1 MG/ML
INJECTION, SOLUTION INTRAMUSCULAR; INTRAVENOUS
Status: DISCONTINUED | OUTPATIENT
Start: 2022-11-02 | End: 2022-11-03

## 2022-11-02 RX ORDER — KETAMINE HCL IN 0.9 % NACL 50 MG/5 ML
SYRINGE (ML) INTRAVENOUS
Status: DISCONTINUED | OUTPATIENT
Start: 2022-11-02 | End: 2022-11-03

## 2022-11-02 RX ORDER — DEXAMETHASONE SODIUM PHOSPHATE 4 MG/ML
INJECTION, SOLUTION INTRA-ARTICULAR; INTRALESIONAL; INTRAMUSCULAR; INTRAVENOUS; SOFT TISSUE
Status: DISCONTINUED | OUTPATIENT
Start: 2022-11-02 | End: 2022-11-03

## 2022-11-02 RX ORDER — HYDRALAZINE HYDROCHLORIDE 20 MG/ML
10 INJECTION INTRAMUSCULAR; INTRAVENOUS EVERY 6 HOURS PRN
Status: DISCONTINUED | OUTPATIENT
Start: 2022-11-02 | End: 2022-11-09 | Stop reason: HOSPADM

## 2022-11-02 RX ORDER — IBUPROFEN 200 MG
16 TABLET ORAL
Status: DISCONTINUED | OUTPATIENT
Start: 2022-11-02 | End: 2022-11-02

## 2022-11-02 RX ORDER — CALCIUM CHLORIDE INJECTION 100 MG/ML
INJECTION, SOLUTION INTRAVENOUS
Status: DISCONTINUED | OUTPATIENT
Start: 2022-11-02 | End: 2022-11-03

## 2022-11-02 RX ORDER — NIFEDIPINE 30 MG/1
60 TABLET, EXTENDED RELEASE ORAL 2 TIMES DAILY
Status: DISCONTINUED | OUTPATIENT
Start: 2022-11-02 | End: 2022-11-05

## 2022-11-02 RX ORDER — SODIUM CHLORIDE 9 MG/ML
INJECTION, SOLUTION INTRAVENOUS ONCE
Status: COMPLETED | OUTPATIENT
Start: 2022-11-02 | End: 2022-11-02

## 2022-11-02 RX ORDER — LIDOCAINE HYDROCHLORIDE 10 MG/ML
INJECTION, SOLUTION INTRAVENOUS
Status: DISCONTINUED | OUTPATIENT
Start: 2022-11-02 | End: 2022-11-03

## 2022-11-02 RX ORDER — NAPROXEN SODIUM 220 MG/1
81 TABLET, FILM COATED ORAL ONCE
Status: COMPLETED | OUTPATIENT
Start: 2022-11-02 | End: 2022-11-02

## 2022-11-02 RX ORDER — ACETAMINOPHEN 650 MG/20.3ML
650 LIQUID ORAL ONCE
Status: COMPLETED | OUTPATIENT
Start: 2022-11-02 | End: 2022-11-02

## 2022-11-02 RX ORDER — HEPARIN SODIUM 1000 [USP'U]/ML
INJECTION, SOLUTION INTRAVENOUS; SUBCUTANEOUS
Status: DISCONTINUED | OUTPATIENT
Start: 2022-11-02 | End: 2022-11-03 | Stop reason: HOSPADM

## 2022-11-02 RX ORDER — CARVEDILOL 25 MG/1
25 TABLET ORAL 2 TIMES DAILY WITH MEALS
Status: DISCONTINUED | OUTPATIENT
Start: 2022-11-02 | End: 2022-11-06

## 2022-11-02 RX ORDER — HEPARIN SODIUM 5000 [USP'U]/ML
5000 INJECTION, SOLUTION INTRAVENOUS; SUBCUTANEOUS ONCE
Status: COMPLETED | OUTPATIENT
Start: 2022-11-02 | End: 2022-11-02

## 2022-11-02 RX ORDER — PROPOFOL 10 MG/ML
VIAL (ML) INTRAVENOUS
Status: DISCONTINUED | OUTPATIENT
Start: 2022-11-02 | End: 2022-11-03

## 2022-11-02 RX ORDER — CISATRACURIUM BESYLATE 10 MG/ML
INJECTION, SOLUTION INTRAVENOUS
Status: DISCONTINUED | OUTPATIENT
Start: 2022-11-02 | End: 2022-11-03

## 2022-11-02 RX ORDER — ONDANSETRON 2 MG/ML
INJECTION INTRAMUSCULAR; INTRAVENOUS
Status: DISCONTINUED | OUTPATIENT
Start: 2022-11-02 | End: 2022-11-03

## 2022-11-02 RX ADMIN — INSULIN HUMAN 0.4 UNITS/HR: 1 INJECTION, SOLUTION INTRAVENOUS at 12:11

## 2022-11-02 RX ADMIN — AMPICILLIN SODIUM AND SULBACTAM SODIUM 3 G: 2; 1 INJECTION, POWDER, FOR SOLUTION INTRAMUSCULAR; INTRAVENOUS at 09:11

## 2022-11-02 RX ADMIN — NIFEDIPINE 60 MG: 30 TABLET, FILM COATED, EXTENDED RELEASE ORAL at 08:11

## 2022-11-02 RX ADMIN — ASPIRIN 81 MG CHEWABLE TABLET 81 MG: 81 TABLET CHEWABLE at 07:11

## 2022-11-02 RX ADMIN — ACETAMINOPHEN 650 MG: 160 SOLUTION ORAL at 09:11

## 2022-11-02 RX ADMIN — LIDOCAINE HYDROCHLORIDE 50 MG: 10 INJECTION, SOLUTION INTRAVENOUS at 09:11

## 2022-11-02 RX ADMIN — SODIUM CHLORIDE: 0.9 INJECTION, SOLUTION INTRAVENOUS at 04:11

## 2022-11-02 RX ADMIN — INSULIN ASPART 5 UNITS: 100 INJECTION, SOLUTION INTRAVENOUS; SUBCUTANEOUS at 12:11

## 2022-11-02 RX ADMIN — Medication 16 G: at 03:11

## 2022-11-02 RX ADMIN — PHENYLEPHRINE HYDROCHLORIDE 100 MCG: 10 INJECTION INTRAVENOUS at 11:11

## 2022-11-02 RX ADMIN — INSULIN HUMAN 1 UNITS/HR: 1 INJECTION, SOLUTION INTRAVENOUS at 02:11

## 2022-11-02 RX ADMIN — Medication 20 MG: at 10:11

## 2022-11-02 RX ADMIN — DIPHENHYDRAMINE HYDROCHLORIDE 50 MG: 50 INJECTION, SOLUTION INTRAMUSCULAR; INTRAVENOUS at 09:11

## 2022-11-02 RX ADMIN — CISATRACURIUM BESYLATE 16 MG: 10 INJECTION, SOLUTION INTRAVENOUS at 09:11

## 2022-11-02 RX ADMIN — INSULIN ASPART 2 UNITS: 100 INJECTION, SOLUTION INTRAVENOUS; SUBCUTANEOUS at 09:11

## 2022-11-02 RX ADMIN — CISATRACURIUM BESYLATE 6 MG: 10 INJECTION, SOLUTION INTRAVENOUS at 11:11

## 2022-11-02 RX ADMIN — ONDANSETRON 4 MG: 2 INJECTION INTRAMUSCULAR; INTRAVENOUS at 09:11

## 2022-11-02 RX ADMIN — CARVEDILOL 25 MG: 25 TABLET, FILM COATED ORAL at 07:11

## 2022-11-02 RX ADMIN — CALCIUM CHLORIDE INJECTION 1 G: 100 INJECTION, SOLUTION INTRAVENOUS at 10:11

## 2022-11-02 RX ADMIN — FENTANYL CITRATE 50 MCG: 50 INJECTION, SOLUTION INTRAMUSCULAR; INTRAVENOUS at 11:11

## 2022-11-02 RX ADMIN — CARVEDILOL 25 MG: 25 TABLET, FILM COATED ORAL at 08:11

## 2022-11-02 RX ADMIN — MIDAZOLAM HYDROCHLORIDE 2 MG: 1 INJECTION, SOLUTION INTRAMUSCULAR; INTRAVENOUS at 09:11

## 2022-11-02 RX ADMIN — FAMOTIDINE 20 MG: 10 INJECTION, SOLUTION INTRAVENOUS at 09:11

## 2022-11-02 RX ADMIN — HYDRALAZINE HYDROCHLORIDE 25 MG: 25 TABLET, FILM COATED ORAL at 06:11

## 2022-11-02 RX ADMIN — FENTANYL CITRATE 100 MCG: 50 INJECTION, SOLUTION INTRAMUSCULAR; INTRAVENOUS at 09:11

## 2022-11-02 RX ADMIN — SODIUM CHLORIDE, SODIUM GLUCONATE, SODIUM ACETATE, POTASSIUM CHLORIDE, MAGNESIUM CHLORIDE, SODIUM PHOSPHATE, DIBASIC, AND POTASSIUM PHOSPHATE: .53; .5; .37; .037; .03; .012; .00082 INJECTION, SOLUTION INTRAVENOUS at 09:11

## 2022-11-02 RX ADMIN — DEXAMETHASONE SODIUM PHOSPHATE 4 MG: 4 INJECTION, SOLUTION INTRAMUSCULAR; INTRAVENOUS at 09:11

## 2022-11-02 RX ADMIN — HEPARIN SODIUM 5000 UNITS: 5000 INJECTION INTRAVENOUS; SUBCUTANEOUS at 07:11

## 2022-11-02 RX ADMIN — PROPOFOL 200 MG: 10 INJECTION, EMULSION INTRAVENOUS at 09:11

## 2022-11-02 RX ADMIN — DEXTROSE 500 MG: 50 INJECTION, SOLUTION INTRAVENOUS at 09:11

## 2022-11-02 RX ADMIN — CISATRACURIUM BESYLATE 4 MG: 10 INJECTION, SOLUTION INTRAVENOUS at 10:11

## 2022-11-02 NOTE — SUBJECTIVE & OBJECTIVE
"  Subjective:     Chief Complaint/Reason for Admission: Kidney/pancreas transplant    History of Present Illness:  Ms. Read is a 27 y.o. year old  female with ESRD secondary to diabetic nephropathy and HTN.  She has been on the wait list for a kidney transplant at [facility] since 2021. Patient is currently on hemodialysis started on 21. Patient is dialyzing on TTS schedule, last HD 22. Patient reports that she is tolerating dialysis well. She has a LUE AV fistula. She is dialyzing 4 hours per session. Dry weight 61 kg. She reports urinating usually once per day ("normal" amount per urination)    She now presents as a direct admit as primary candidate for a  donor kidney/pancreas transplant. OR times have not been established at this time. Pt is currently not NPO. Dr. Rodriges will be the primary surgeon. Thymo induction. Pre-op labs and imaging have been ordered and will be reviewed prior to transplant. Endocrine consulted to assist with blood sugar management.    Dialysis History: Ms. Mar with ESRD, requiring chronic dialysis who is on hemodialysis started on 21. Patient is dialyzing on TTS schedule.  Patient reports that she is tolerating dialysis well.  Date of Last Dialysis: 22    Native urine output per day: One void per day ("normal" amount per void)    Previous Transplant: no    PTA Medications   Medication Sig    acetaminophen (TYLENOL EXTRA STRENGTH ORAL) Take 2 tablets by mouth daily as needed (PAIN).    aspirin (ECOTRIN) 81 MG EC tablet Take 81 mg by mouth once daily.    blood sugar diagnostic Strp To check BG 4 - 6 times daily, to use with insurance preferred meter    calcitRIOL (ROCALTROL) 0.5 MCG Cap Take 0.5 mcg by mouth once daily.    carvediloL (COREG) 25 MG tablet Take 25 mg by mouth 2 (two) times daily with meals.    flash glucose scanning reader ([employer] MARCY 14 DAY READER) Misc 1 each by Misc.(Non-Drug; Combo Route) route once daily.    flash " "glucose sensor (FREESTYLE MARCY 14 DAY SENSOR) Kit 6 kits by Misc.(Non-Drug; Combo Route) route once daily.    fluticasone propionate (FLONASE) 50 mcg/actuation nasal spray 1 spray by Each Nostril route daily as needed for Rhinitis or Allergies.    hydrALAZINE (APRESOLINE) 25 MG tablet Take 1 tablet (25 mg total) by mouth every 8 (eight) hours.    insulin detemir U-100 (LEVEMIR FLEXTOUCH) 100 unit/mL (3 mL) SubQ InPn pen 10 units subcutaneously in the morning and 12 units sq at night    insulin lispro (HUMALOG KWIKPEN INSULIN) 100 unit/mL pen Inject 10 units into skin the skin 3 three times daily with meals    lancets Misc To check BG 4 - 6 times daily, to use with insurance preferred meter    medroxyPROGESTERone (DEPO-PROVERA) 150 mg/mL Syrg Inject 1 mL (150 mg total) into the muscle once. for 1 dose    multivit-min/folic acid/biotin (HAIR,SKIN AND NAILS,FA-BIOTIN, ORAL) Take 2 tablets by mouth once daily.    NIFEdipine (PROCARDIA-XL) 60 MG (OSM) 24 hr tablet Take 60 mg by mouth 2 (two) times a day.    pen needle, diabetic 32 gauge x 5/32" Ndle For three times daily injection    prednisoLONE acetate (PRED FORTE) 1 % DrpS Place 1 drop into the right eye 4 (four) times daily.    rosuvastatin (CRESTOR) 10 MG tablet TAKE 1 TABLET BY MOUTH ONCE DAILY IN THE EVENING    sevelamer carbonate (RENVELA) 800 mg Tab TAKE 1 TABLET BY MOUTH THREE TIMES DAILY WITH MEALS    traZODone (DESYREL) 50 MG tablet Take 1 tablet (50 mg total) by mouth nightly as needed for Insomnia.    venlafaxine (EFFEXOR XR) 37.5 MG 24 hr capsule Take 1 capsule (37.5 mg total) by mouth once daily.       Review of patient's allergies indicates:  No Known Allergies    Past Medical History:   Diagnosis Date    Acute kidney injury superimposed on chronic kidney disease 4/7/2021    Anemia     Chronic hypertension with exacerbation during pregnancy in second trimester 11/6/2020    Current regimen (11/6/20):  - Carvedilol 12.5 mg BID - Nifedipine 30 mg daily " Baseline CKD + proteinuria    Chronic kidney disease     Depression     Diabetes mellitus     Diabetic retinopathy     Diarrhea     Encounter for blood transfusion     Gastroparesis     Glaucoma     Hepatomegaly 4/29/2021    Hx of psychiatric care     Hyperlipidemia     Hypertension     Nephrotic syndrome     Nephrotic syndrome 3/4/2021    Palpitations     Poor fetal growth affecting management of mother in second trimester 10/15/2020    Restrictive lung disease     Retinal detachment     Severe pre-eclampsia in second trimester 11/6/2020     Past Surgical History:   Procedure Laterality Date    AV FISTULA PLACEMENT Left 4/7/2021    Procedure: CREATION, AV FISTULA;  Surgeon: Roberto Ryan MD;  [facility];  Service: Peripheral Vascular;  Laterality: Left;    COLONOSCOPY N/A 3/16/2022    Procedure: COLONOSCOPY;  Surgeon: Tavo Kwok MD;  [facility]  Service: Endoscopy;  Laterality: N/A;  Questionable history of delayed gastric emptying longstanding diabetes now on eating pre transplant workup for history of nausea vomiting which seems to have improved with dialysis also chronic diarrhea and history of anemia pre transplant workup for kidney transplant. 3    ESOPHAGOGASTRODUODENOSCOPY N/A 3/16/2022    Procedure: EGD (ESOPHAGOGASTRODUODENOSCOPY);  Surgeon: Tavo Kwok MD;  [facility]  Service: Endoscopy;  Laterality: N/A;  Questionable history of delayed gastric emptying longstanding diabetes now on eating pre transplant workup for history of nausea vomiting which seems to have improved with dialysis also chronic diarrhea and history of anemia pre transplant workup for    FISTULOGRAM N/A 8/11/2021    Procedure: Fistulogram;  Surgeon: Roberto Ryan MD;  Location: [facility] CATH LAB;  Service: Cardiology;  Laterality: N/A;    LASER PHOTOCOAGULATION OF RETINA Right 5/31/2022    Procedure: PHOTOCOAGULATION, RETINA, USING LASER;  Surgeon: Maximilian  RODOLFO Montaño MD;  Location: [facility] OR 1ST FLR;  Service: Ophthalmology;  Laterality: Right;    PERCUTANEOUS TRANSLUMINAL ANGIOPLASTY OF ARTERIOVENOUS FISTULA N/A 8/11/2021    Procedure: PTA, AV FISTULA;  Surgeon: Roberto Ryan MD;  Location: [facility] CATH LAB;  Service: Cardiology;  Laterality: N/A;    REMOVAL IMPLANT, POSTERIOR SEGMENT, INTRAOCULAR Right 2/1/2022    Procedure: REMOVAL IMPLANT, POSTERIOR SEGMENT, INTRAOCULAR;  Surgeon: Maximilian Montaño MD;  [facility];  Service: Ophthalmology;  Laterality: Right;    REPAIR OF RETINAL DETACHMENT WITH VITRECTOMY Right 1/25/2022    Procedure: REPAIR, RETINAL DETACHMENT, WITH VITRECTOMY, MEMBRANE PEEL, LASER, INJECTION OF GAS VS OIL;  Surgeon: Maximilian Montaño MD;  Location: [facility] OR 1ST FLR;  Service: Ophthalmology;  Laterality: Right;    REVISION OF ARTERIOVENOUS FISTULA Left 12/10/2021    Procedure: REVISION, AV FISTULA with BVT;  Surgeon: Roberto Ryan MD;  Location: [facility] OR 2ND FLR;  Service: Peripheral Vascular;  Laterality: Left;    VITRECTOMY BY PARS PLANA APPROACH Right 2/1/2022    Procedure: VITRECTOMY, PARS PLANA APPROACH;  Surgeon: Maximilian Montaño MD;  Location: [facility] OR 1ST FLR;  Service: Ophthalmology;  Laterality: Right;    VITRECTOMY BY PARS PLANA APPROACH Right 5/31/2022    Procedure: VITRECTOMY, PARS PLANA APPROACH;  Surgeon: Maximilian Montaño MD;  [facility];  Service: Ophthalmology;  Laterality: Right;     Family History       Problem Relation (Age of Onset)    Diabetes Brother    Heart disease Father    Hypertension Mother          Tobacco Use    Smoking status: Never    Smokeless tobacco: Never   Substance and Sexual Activity    Alcohol use: No    Drug use: No    Sexual activity: Not Currently     Partners: Male     Comment: monogamous        Review of Systems   Constitutional:  Negative for activity change, chills, fatigue and fever.   HENT: Negative.  Negative for congestion.    Respiratory:   "Negative for cough, chest tightness and shortness of breath.    Cardiovascular:  Negative for chest pain and leg swelling.   Gastrointestinal:  Negative for abdominal distention, abdominal pain, constipation, diarrhea and nausea.   Genitourinary:  Negative for difficulty urinating and dysuria.   Skin:  Negative for wound.   Allergic/Immunologic: Negative for immunocompromised state.   Neurological:  Negative for dizziness, weakness and headaches.   Psychiatric/Behavioral:  Negative for agitation and confusion.    Objective:     Vital Signs (Most Recent):  Temp: 98.1 °F (36.7 °C) (11/02/22 0039)  Pulse: 100 (11/02/22 0039)  Resp: 18 (11/02/22 0039)  BP: (!) 204/116 (11/02/22 0039)  SpO2: (!) 94 % (11/02/22 0039)    Height: 5' 4" (162.6 cm)  Weight: 66.4 kg (146 lb 6.2 oz)  Body mass index is 25.13 kg/m².     Physical Exam  Constitutional:       Appearance: Normal appearance.   HENT:      Mouth/Throat:      Pharynx: Oropharynx is clear.   Eyes:      Conjunctiva/sclera: Conjunctivae normal.   Cardiovascular:      Rate and Rhythm: Normal rate and regular rhythm.   Pulmonary:      Effort: Pulmonary effort is normal.      Breath sounds: Normal breath sounds.   Abdominal:      General: Bowel sounds are normal.      Palpations: Abdomen is soft.   Musculoskeletal:      Comments: LUE AV HD access   Skin:     General: Skin is warm and dry.   Neurological:      Mental Status: She is alert and oriented to person, place, and time.   Psychiatric:         Mood and Affect: Mood normal.         Behavior: Behavior normal.       Laboratory  Labs within the past 24 hours have been reviewed.    Diagnostic Results:  CXR to be reviewed    Patient was SARS-CoV-2 /COVID-19 tested with negative results.   "

## 2022-11-02 NOTE — PLAN OF CARE
Xray arrived and done  EKG arrived and done  Labs collected   Covid swab collected and sent  Pt states she only urinates 1x/day; urine specimen cup provided and informed pt to provide urine sample when possible  22 g PIV placed to R FA, L arm fistula +/+  Checking BG q2 hr pre-op  Diabetic diet for now, no OR times yet

## 2022-11-02 NOTE — ASSESSMENT & PLAN NOTE
- Presents as direct admit for kidney/pancreas transplant  - Active on [facility] transplant list since 5/31/21  - TTS HD, last HD 11/1  - Tolerating HD well, 4 hours per session. LUE AV access  - Dry wt 61 kg  - Endocrine consulted  - Pre-op labs/imaging ordered, to be reviewed prior to transplant  - No OR times yet  - Thymo

## 2022-11-02 NOTE — HPI
Reason for Consult: Management of T1DM, Hyperglycemia     Surgical Procedure and Date: Kidney/Pancreas Transplant planned for 2022    Diabetes diagnosis year: 8 yrs ago    Home Diabetes Medications:  Levemir 10 units in the am and 12 units q HS, Humalog 10 units with meals + SSI ISF 1:35    How often checking glucose at home? >4 x day Prev used ally waiting on new sensors  BG readings on regimen: Variable low to mid 100s typically  Hypoglycemia on the regimen?  No  Missed doses on regimen?  No    Diabetes Complications include:     Diabetic nephropathy      Complicating diabetes co morbidities:   ESRD      HPI:    Patient is a 27 y.o. female with ESRD secondary to diabetic nephropathy and HTN who presents as a direct admit as primary candidate for a  donor kidney/pancreas transplant. OR times have not been established at this time.  Dr. Rodriges will be the primary surgeon. Thymo induction.  Endocrine consulted to assist with blood sugar management.

## 2022-11-02 NOTE — PROGRESS NOTES
Patient is AAOx4.  Afebrile  RA  Q1h insulin gtt started.  Transplant scheduled for 8 PM.  K 5.5, 2hrs of HD ordered.  Mother is at bedside.   WCTM

## 2022-11-02 NOTE — ANESTHESIA PREPROCEDURE EVALUATION
[facility]  Anesthesia Pre-Operative Evaluation         Patient Name: Isabela Read  YOB: 1995  MRN: [id number]    SUBJECTIVE:     Pre-operative evaluation for Procedure(s) (LRB):  TRANSPLANT, KIDNEY (N/A)  TRANSPLANT, PANCREAS (N/A)     11/02/2022    Isabela Read is a 27 y.o. female with a significant medical history of PMHx of T1DM, ESRD on HD, HLD, BALDOMERO, hx of flash pulm edema who presents for the above procedure.     Patient is dialyzing on TTS schedule, last HD 11/1/22. She has a LUE AV fistula.     LDA:       Peripheral IV - Single Lumen 11/02/22 0035 22 G Anterior;Right Forearm (Active)   Site Assessment Clean;Dry;Intact;No redness;No swelling 11/02/22 0800   Extremity Assessment Distal to IV No abnormal discoloration;No redness;No swelling;No warmth 11/02/22 0800   Line Status Saline locked 11/02/22 0800   Dressing Status Clean;Dry;Intact 11/02/22 0800   Dressing Intervention Integrity maintained 11/02/22 0800   Dressing Change Due 11/06/22 11/02/22 0800   Site Change Due 11/06/22 11/02/22 0800   Reason Not Rotated Not due 11/02/22 0800   Number of days: 0            Hemodialysis AV Fistula Left upper arm (Active)   Needle Size 15ga 09/14/22 0908   Site Assessment Dry;Clean;Intact;No redness;No swelling 11/02/22 0800   Patency Thrill;Bruit;Present 11/02/22 0800   Status Deaccessed 09/15/22 1312   Flows Good 09/15/22 0928   Dressing Intervention First dressing 05/17/22 1500   Dressing Status Clean;Dry;Intact 11/02/22 0800   Site Condition No complications 11/02/22 0800   Dressing Gauze 09/15/22 0928   Number of days:        Prev airway:   Intubation:     Induction:  Intravenous    Intubated:  Postinduction    Mask Ventilation:  Easy mask    Attempts:  1    Attempted By:  Resident anesthesiologist    Method of Intubation:  Direct    Blade:  Bailey 2    Laryngeal View Grade: Grade I - full view of cords      Difficult Airway Encountered?: No      Complications:   None    Airway Device:  Oral endotracheal tube    Airway Device Size:  7.0    Drips:    insulin regular 1 units/mL infusion orderable (DKA) 1 Units/hr (11/02/22 1457)       Patient Active Problem List   Diagnosis    Renovascular hypertension    Shortness of breath    Type 1 diabetes mellitus with end-stage renal disease (ESRD)    Ventricular bigeminy    Vitamin D deficiency    Mixed hyperlipidemia    Uremia    Anemia in ESRD (end-stage renal disease)    Iron deficiency anemia    Hyperkalemia    Anxiety    Hyperglycemia due to type 1 diabetes mellitus    Recurrent major depressive disorder, in partial remission    Metabolic acidosis    Anemia due to chronic kidney disease    Nephrotic syndrome    Anasarca    Secondary hyperparathyroidism    Hypoalbuminemia    Uncontrolled type 1 diabetes mellitus with hyperglycemia    Acute kidney injury superimposed on chronic kidney disease    Hepatomegaly    End stage renal failure on dialysis    Hypertension, essential    Iron overload    Arteriovenous fistula occlusion    Flash pulmonary edema    Type 1 diabetes mellitus with ketoacidosis without coma    Hypertensive urgency    Anemia due to chronic kidney disease, on chronic dialysis    Acute angle-closure glaucoma of right eye    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, associated with type 1 diabetes mellitus    Insomnia    Dependence on renal dialysis    Gastroparesis    Hypertensive chronic kidney disease with stage 5 chronic kidney disease or end stage renal disease    Hypocalcemia    Major depressive disorder, single episode, unspecified    Proteinuria, unspecified    Moderate protein-calorie malnutrition    Nonspecific reaction to tuberculin skin test without active tuberculosis    Other specified coagulation defects    Vascular headache, not elsewhere classified    Acute and chronic respiratory failure with  hypoxia    Type 1 diabetes mellitus with diabetic autonomic (poly)neuropathy    Type 1 diabetes mellitus with hypoglycemia without coma    (HFpEF) heart failure with preserved ejection fraction    Patient on waiting list for kidney-pancreas transplant    ESRD on hemodialysis    Chronic kidney disease-mineral and bone disorder    Sinus tachycardia    Abnormal CT scan of lung    Hypoglycemia associated with diabetes       Review of patient's allergies indicates:  No Known Allergies    Current Inpatient Medications:   acetaminophen  650 mg Per NG tube Once    antithymocyte globulin (rabbit), hydrocortisone 20mg, with optional heparin 1000 units in NS 500ml (FOR PERIPHERAL LINE ADMINISTRATION ONLY)  1.5 mg/kg (Adjusted) Intravenous Once    aspirin  81 mg Oral Once    carvediloL  25 mg Oral BID WM    diphenhydrAMINE  50 mg Intravenous Once    heparin (porcine)  5,000 Units Subcutaneous Once    hydrALAZINE  25 mg Oral Q8H    methylPREDNISolone sodium succinate injection  500 mg Intravenous Once    NIFEdipine  60 mg Oral BID       Current Facility-Administered Medications on File Prior to Encounter   Medication Dose Route Frequency Provider Last Rate Last Admin    0.9%  NaCl infusion   Intravenous Continuous Tavo Becker MD 25 mL/hr at 12/10/21 1043 New Bag at 12/10/21 1043     Current Outpatient Medications on File Prior to Encounter   Medication Sig Dispense Refill    acetaminophen (TYLENOL EXTRA STRENGTH ORAL) Take 2 tablets by mouth daily as needed (PAIN).      aspirin (ECOTRIN) 81 MG EC tablet Take 81 mg by mouth once daily.      blood sugar diagnostic Strp To check BG 4 - 6 times daily, to use with insurance preferred meter 150 each 11    calcitRIOL (ROCALTROL) 0.5 MCG Cap Take 0.5 mcg by mouth once daily.      carvediloL (COREG) 25 MG tablet Take 25 mg by mouth 2 (two) times daily with meals.      flash glucose scanning reader ([employer]oolink MARCY 14 DAY READER) Misc 1 each by  "Misc.(Non-Drug; Combo Route) route once daily. 1 each 0    flash glucose sensor (FREESTYLE MARCY 14 DAY SENSOR) Kit 6 kits by Misc.(Non-Drug; Combo Route) route once daily. 6 kit 3    fluticasone propionate (FLONASE) 50 mcg/actuation nasal spray 1 spray by Each Nostril route daily as needed for Rhinitis or Allergies.      hydrALAZINE (APRESOLINE) 25 MG tablet Take 1 tablet (25 mg total) by mouth every 8 (eight) hours. 90 tablet 3    insulin detemir U-100 (LEVEMIR FLEXTOUCH) 100 unit/mL (3 mL) SubQ InPn pen 10 units subcutaneously in the morning and 12 units sq at night 15 mL 6    insulin lispro (HUMALOG KWIKPEN INSULIN) 100 unit/mL pen Inject 10 units into skin the skin 3 three times daily with meals 15 mL 6    lancets Misc To check BG 4 - 6 times daily, to use with insurance preferred meter 150 each 11    medroxyPROGESTERone (DEPO-PROVERA) 150 mg/mL Syrg Inject 1 mL (150 mg total) into the muscle once. for 1 dose 1 mL 3    multivit-min/folic acid/biotin (HAIR,SKIN AND NAILS,FA-BIOTIN, ORAL) Take 2 tablets by mouth once daily.      NIFEdipine (PROCARDIA-XL) 60 MG (OSM) 24 hr tablet Take 60 mg by mouth 2 (two) times a day.      pen needle, diabetic 32 gauge x 5/32" Ndle For three times daily injection 100 each 11    prednisoLONE acetate (PRED FORTE) 1 % DrpS Place 1 drop into the right eye 4 (four) times daily. 15 mL 0    rosuvastatin (CRESTOR) 10 MG tablet TAKE 1 TABLET BY MOUTH ONCE DAILY IN THE EVENING 90 tablet 0    sevelamer carbonate (RENVELA) 800 mg Tab TAKE 1 TABLET BY MOUTH THREE TIMES DAILY WITH MEALS 90 tablet 0    traZODone (DESYREL) 50 MG tablet Take 1 tablet (50 mg total) by mouth nightly as needed for Insomnia. 30 tablet 1    venlafaxine (EFFEXOR XR) 37.5 MG 24 hr capsule Take 1 capsule (37.5 mg total) by mouth once daily. 30 capsule 11       Past Surgical History:   Procedure Laterality Date    AV FISTULA PLACEMENT Left 4/7/2021    Procedure: CREATION, AV FISTULA;  Surgeon: Roberto FUNEZ" MD [place];  Location: [facility] OR 2ND FLR;  Service: Peripheral Vascular;  Laterality: Left;    COLONOSCOPY N/A 3/16/2022    Procedure: COLONOSCOPY;  Surgeon: Tavo Kwok MD;  Location: [facility] ENDO (4TH FLR);  Service: Endoscopy;  Laterality: N/A;  Questionable history of delayed gastric emptying longstanding diabetes now on eating pre transplant workup for history of nausea vomiting which seems to have improved with dialysis also chronic diarrhea and history of anemia pre transplant workup for kidney transplant. 3    ESOPHAGOGASTRODUODENOSCOPY N/A 3/16/2022    Procedure: EGD (ESOPHAGOGASTRODUODENOSCOPY);  Surgeon: Tavo Kwok MD;  Location: [facility] ENDO (4TH FLR);  Service: Endoscopy;  Laterality: N/A;  Questionable history of delayed gastric emptying longstanding diabetes now on eating pre transplant workup for history of nausea vomiting which seems to have improved with dialysis also chronic diarrhea and history of anemia pre transplant workup for    FISTULOGRAM N/A 8/11/2021    Procedure: Fistulogram;  Surgeon: Roberto Ryan MD;  Location: [facility] CATH LAB;  Service: Cardiology;  Laterality: N/A;    LASER PHOTOCOAGULATION OF RETINA Right 5/31/2022    Procedure: PHOTOCOAGULATION, RETINA, USING LASER;  Surgeon: Maximilian Montaño MD;  Location: [address];  Service: Ophthalmology;  Laterality: Right;    PERCUTANEOUS TRANSLUMINAL ANGIOPLASTY OF ARTERIOVENOUS FISTULA N/A 8/11/2021    Procedure: PTA, AV FISTULA;  Surgeon: Roberto Ryan MD;  Location: [facility] CATH LAB;  Service: Cardiology;  Laterality: N/A;    REMOVAL IMPLANT, POSTERIOR SEGMENT, INTRAOCULAR Right 2/1/2022    Procedure: REMOVAL IMPLANT, POSTERIOR SEGMENT, INTRAOCULAR;  Surgeon: Maximilian Montaño MD;  [facility];  Service: Ophthalmology;  Laterality: Right;    REPAIR OF RETINAL DETACHMENT WITH VITRECTOMY Right 1/25/2022    Procedure: REPAIR, RETINAL DETACHMENT, WITH VITRECTOMY, MEMBRANE PEEL, LASER,  INJECTION OF GAS VS OIL;  Surgeon: Maximilian Montaño MD;  Location: [facility] OR 1ST FLR;  Service: Ophthalmology;  Laterality: Right;    REVISION OF ARTERIOVENOUS FISTULA Left 12/10/2021    Procedure: REVISION, AV FISTULA with BVT;  Surgeon: Roberto Ryan MD;  Location: [facility] OR 2ND FLR;  Service: Peripheral Vascular;  Laterality: Left;    VITRECTOMY BY PARS PLANA APPROACH Right 2/1/2022    Procedure: VITRECTOMY, PARS PLANA APPROACH;  Surgeon: Maximilian Montaño MD;  Location: [facility] OR 1ST FLR;  Service: Ophthalmology;  Laterality: Right;    VITRECTOMY BY PARS PLANA APPROACH Right 5/31/2022    Procedure: VITRECTOMY, PARS PLANA APPROACH;  Surgeon: Maximilian Montaño MD;  Location: [facility] OR 1ST FLR;  Service: Ophthalmology;  Laterality: Right;       Social History     Socioeconomic History    Marital status: Single   Occupational History    Occupation:  at [employer]   Tobacco Use    Smoking status: Never    Smokeless tobacco: Never   Substance and Sexual Activity    Alcohol use: No    Drug use: No    Sexual activity: Not Currently     Partners: Male     Comment: monogamous   Social History Narrative    Caregiver        Single    No kids    Had Miscarriage  At 23 weeks from preeclampsia    Disabled       OBJECTIVE:     Vital Signs Range:  Vitals - 1 value per visit 11/2/2022 11/2/2022 11/2/2022   SYSTOLIC 128 128 128   DIASTOLIC 67 67 72   Pulse 95 95 94   Temp 98.3 98.2 99   Resp 18 18 20   SPO2 98 98 95   Weight (lb) - - -   Weight (kg) - - -   Height - - -   BMI (Calculated) - - -   VISIT REPORT - - -   Pain Score  - - -   Some recent data might be hidden         CBC:   Lab Results   Component Value Date    WBC 6.29 11/02/2022    HGB 9.9 (L) 11/02/2022    HCT 30.0 (L) 11/02/2022    MCV 99 (H) 11/02/2022     11/02/2022         CMP:   Sodium   Date Value Ref Range Status   11/02/2022 133 (L) 136 - 145 mmol/L Final     Potassium   Date Value Ref Range Status   11/02/2022 4.8 3.5 - 5.1  mmol/L Final     Chloride   Date Value Ref Range Status   11/02/2022 93 (L) 95 - 110 mmol/L Final     CO2   Date Value Ref Range Status   11/02/2022 26 23 - 29 mmol/L Final     Glucose   Date Value Ref Range Status   11/02/2022 137 (H) 70 - 110 mg/dL Final     BUN   Date Value Ref Range Status   11/02/2022 40 (H) 6 - 20 mg/dL Final     Creatinine   Date Value Ref Range Status   11/02/2022 6.5 (H) 0.5 - 1.4 mg/dL Final     Calcium   Date Value Ref Range Status   11/02/2022 9.2 8.7 - 10.5 mg/dL Final     Total Protein   Date Value Ref Range Status   11/02/2022 8.5 (H) 6.0 - 8.4 g/dL Final     Albumin   Date Value Ref Range Status   11/02/2022 4.0 3.5 - 5.2 g/dL Final     Total Bilirubin   Date Value Ref Range Status   11/02/2022 0.7 0.1 - 1.0 mg/dL Final     Comment:     For infants and newborns, interpretation of results should be based  on gestational age, weight and in agreement with clinical  observations.    Premature Infant recommended reference ranges:  Up to 24 hours.............<8.0 mg/dL  Up to 48 hours............<12.0 mg/dL  3-5 days..................<15.0 mg/dL  6-29 days.................<15.0 mg/dL       Alkaline Phosphatase   Date Value Ref Range Status   11/02/2022 73 55 - 135 U/L Final     AST   Date Value Ref Range Status   11/02/2022 19 10 - 40 U/L Final     Comment:     *Result may be interfered by visible hemolysis     ALT   Date Value Ref Range Status   11/02/2022 16 10 - 44 U/L Final     Anion Gap   Date Value Ref Range Status   11/02/2022 14 8 - 16 mmol/L Final     eGFR if    Date Value Ref Range Status   05/25/2022 10.4 (A) >60 mL/min/1.73 m^2 Final     eGFR if non    Date Value Ref Range Status   05/25/2022 9.1 (A) >60 mL/min/1.73 m^2 Final     Comment:     Calculation used to obtain the estimated glomerular filtration  rate (eGFR) is the CKD-EPI equation.          INR:  Lab Results   Component Value Date    INR 1.1 11/02/2022    INR 1.2 05/08/2022    INR 1.0  05/21/2021       EKG:   Results for orders placed or performed during the hospital encounter of 11/02/22   EKG 12-lead    Collection Time: 11/02/22 12:31 AM    Narrative    Test Reason : Z01.818,    Vent. Rate : 102 BPM     Atrial Rate : 102 BPM     P-R Int : 166 ms          QRS Dur : 080 ms      QT Int : 358 ms       P-R-T Axes : 054 027 065 degrees     QTc Int : 466 ms    Sinus tachycardia  Possible Left atrial enlargement  Borderline Abnormal ECG  When compared with ECG of 14-SEP-2022 08:44,  No significant change was found  Confirmed by Sohail SANZ MD ([id number]) on 11/2/2022 10:54:37 AM    Referred By: LAWRENCE NEW           Confirmed By:Sohail SANZ MD        2D ECHO:   Results for orders placed during the hospital encounter of 08/05/22    Echo    Interpretation Summary  · The left ventricle is normal in size with concentric remodeling and normal systolic function. The estimated ejection fraction is 55%.  · Normal right ventricular size with normal right ventricular systolic function.  · Indeterminate left ventricular diastolic function.  · Mild left atrial enlargement.  · Normal central venous pressure (3 mmHg).         ASSESSMENT/PLAN:         Pre-op Assessment    I have reviewed the Patient Summary Reports.     I have reviewed the Nursing Notes.       Review of Systems  Anesthesia Hx:  No problems with previous Anesthesia  History of prior surgery of interest to airway management or planning: Denies Family Hx of Anesthesia complications.   Denies Personal Hx of Anesthesia complications.   Cardiovascular:   Hypertension Denies MI.    Denies Angina. Cardiac MRI reviewed  EF 55%   Pulmonary:  Pulmonary Normal  Denies Asthma.  Denies Recent URI.    Renal/:   Chronic Renal Disease (had dialysis yesterday- has had high potassium levels recently), ESRD, Dialysis    Endocrine:   Diabetes, type 1    Psych:   depression          Physical Exam  General: Alert, Oriented and Well nourished    Airway:  Mallampati: II   Mouth  Opening: Normal  TM Distance: Normal  Neck ROM: Normal ROM    Dental:  Braces        Anesthesia Plan  Type of Anesthesia, risks & benefits discussed:    Anesthesia Type: Gen ETT  Intra-op Monitoring Plan: Standard ASA Monitors and Art Line  Post Op Pain Control Plan: multimodal analgesia and IV/PO Opioids PRN  Induction:  IV  Airway Plan: Direct and Video, Post-Induction  Informed Consent: Informed consent signed with the Patient and all parties understand the risks and agree with anesthesia plan.  All questions answered.   ASA Score: 3  Day of Surgery Review of History & Physical: H&P Update referred to the surgeon/provider.    Ready For Surgery From Anesthesia Perspective.     .

## 2022-11-02 NOTE — H&P
"[facility]  Kidney Transplant  H&P      Subjective:     Chief Complaint/Reason for Admission: Kidney/pancreas transplant    History of Present Illness:  Ms. Read is a 27 y.o. year old  female with ESRD secondary to diabetic nephropathy and HTN.  She has been on the wait list for a kidney transplant at [facility] since 2021. Patient is currently on hemodialysis started on 21. Patient is dialyzing on TTS schedule, last HD 22. Patient reports that she is tolerating dialysis well. She has a LUE AV fistula. She is dialyzing 4 hours per session. Dry weight 61 kg. She reports urinating usually once per day ("normal" amount per urination)    She now presents as a direct admit as primary candidate for a  donor kidney/pancreas transplant. OR times have not been established at this time. Pt is currently not NPO. Dr. Rodriges will be the primary surgeon. Thymo induction. Pre-op labs and imaging have been ordered and will be reviewed prior to transplant. Endocrine consulted to assist with blood sugar management.    Dialysis History: Ms. Mar with ESRD, requiring chronic dialysis who is on hemodialysis started on 21. Patient is dialyzing on TTS schedule.  Patient reports that she is tolerating dialysis well.  Date of Last Dialysis: 22    Native urine output per day: One void per day ("normal" amount per void)    Previous Transplant: no    PTA Medications   Medication Sig    acetaminophen (TYLENOL EXTRA STRENGTH ORAL) Take 2 tablets by mouth daily as needed (PAIN).    aspirin (ECOTRIN) 81 MG EC tablet Take 81 mg by mouth once daily.    blood sugar diagnostic Strp To check BG 4 - 6 times daily, to use with insurance preferred meter    calcitRIOL (ROCALTROL) 0.5 MCG Cap Take 0.5 mcg by mouth once daily.    carvediloL (COREG) 25 MG tablet Take 25 mg by mouth 2 (two) times daily with meals.    flash glucose scanning reader ([employer] MARCY 14 DAY READER) [facility] 1 " "each by Misc.(Non-Drug; Combo Route) route once daily.    flash glucose sensor (FREESTYLE MARCY 14 DAY SENSOR) Kit 6 kits by Misc.(Non-Drug; Combo Route) route once daily.    fluticasone propionate (FLONASE) 50 mcg/actuation nasal spray 1 spray by Each Nostril route daily as needed for Rhinitis or Allergies.    hydrALAZINE (APRESOLINE) 25 MG tablet Take 1 tablet (25 mg total) by mouth every 8 (eight) hours.    insulin detemir U-100 (LEVEMIR FLEXTOUCH) 100 unit/mL (3 mL) SubQ InPn pen 10 units subcutaneously in the morning and 12 units sq at night    insulin lispro (HUMALOG KWIKPEN INSULIN) 100 unit/mL pen Inject 10 units into skin the skin 3 three times daily with meals    lancets Misc To check BG 4 - 6 times daily, to use with insurance preferred meter    medroxyPROGESTERone (DEPO-PROVERA) 150 mg/mL Syrg Inject 1 mL (150 mg total) into the muscle once. for 1 dose    multivit-min/folic acid/biotin (HAIR,SKIN AND NAILS,FA-BIOTIN, ORAL) Take 2 tablets by mouth once daily.    NIFEdipine (PROCARDIA-XL) 60 MG (OSM) 24 hr tablet Take 60 mg by mouth 2 (two) times a day.    pen needle, diabetic 32 gauge x 5/32" Ndle For three times daily injection    prednisoLONE acetate (PRED FORTE) 1 % DrpS Place 1 drop into the right eye 4 (four) times daily.    rosuvastatin (CRESTOR) 10 MG tablet TAKE 1 TABLET BY MOUTH ONCE DAILY IN THE EVENING    sevelamer carbonate (RENVELA) 800 mg Tab TAKE 1 TABLET BY MOUTH THREE TIMES DAILY WITH MEALS    traZODone (DESYREL) 50 MG tablet Take 1 tablet (50 mg total) by mouth nightly as needed for Insomnia.    venlafaxine (EFFEXOR XR) 37.5 MG 24 hr capsule Take 1 capsule (37.5 mg total) by mouth once daily.       Review of patient's allergies indicates:  No Known Allergies    Past Medical History:   Diagnosis Date    Acute kidney injury superimposed on chronic kidney disease 4/7/2021    Anemia     Chronic hypertension with exacerbation during pregnancy in second trimester 11/6/2020    " Current regimen (11/6/20):  - Carvedilol 12.5 mg BID - Nifedipine 30 mg daily Baseline CKD + proteinuria    Chronic kidney disease     Depression     Diabetes mellitus     Diabetic retinopathy     Diarrhea     Encounter for blood transfusion     Gastroparesis     Glaucoma     Hepatomegaly 4/29/2021    Hx of psychiatric care     Hyperlipidemia     Hypertension     Nephrotic syndrome     Nephrotic syndrome 3/4/2021    Palpitations     Poor fetal growth affecting management of mother in second trimester 10/15/2020    Restrictive lung disease     Retinal detachment     Severe pre-eclampsia in second trimester 11/6/2020     Past Surgical History:   Procedure Laterality Date    AV FISTULA PLACEMENT Left 4/7/2021    Procedure: CREATION, AV FISTULA;  Surgeon: Roberto Ryan MD;  Location: [facility] OR 2ND FLR;  Service: Peripheral Vascular;  Laterality: Left;    COLONOSCOPY N/A 3/16/2022    Procedure: COLONOSCOPY;  Surgeon: Tavo Kwok MD;  Location: [facility] ENDO (4TH FLR);  Service: Endoscopy;  Laterality: N/A;  Questionable history of delayed gastric emptying longstanding diabetes now on eating pre transplant workup for history of nausea vomiting which seems to have improved with dialysis also chronic diarrhea and history of anemia pre transplant workup for kidney transplant. 3    ESOPHAGOGASTRODUODENOSCOPY N/A 3/16/2022    Procedure: EGD (ESOPHAGOGASTRODUODENOSCOPY);  Surgeon: Tavo Kwok MD;  Location: [facility] (4TH FLR);  Service: Endoscopy;  Laterality: N/A;  Questionable history of delayed gastric emptying longstanding diabetes now on eating pre transplant workup for history of nausea vomiting which seems to have improved with dialysis also chronic diarrhea and history of anemia pre transplant workup for    FISTULOGRAM N/A 8/11/2021    Procedure: Fistulogram;  Surgeon: Roberto Ryan MD;  Location: [facility] CATH LAB;  Service: Cardiology;  Laterality: N/A;    LASER  PHOTOCOAGULATION OF RETINA Right 5/31/2022    Procedure: PHOTOCOAGULATION, RETINA, USING LASER;  Surgeon: Maximilian Montaño MD;  Location: [facility] OR 1ST FLR;  Service: Ophthalmology;  Laterality: Right;    PERCUTANEOUS TRANSLUMINAL ANGIOPLASTY OF ARTERIOVENOUS FISTULA N/A 8/11/2021    Procedure: PTA, AV FISTULA;  Surgeon: Roberto Ryan MD;  Location: [facility] CATH LAB;  Service: Cardiology;  Laterality: N/A;    REMOVAL IMPLANT, POSTERIOR SEGMENT, INTRAOCULAR Right 2/1/2022    Procedure: REMOVAL IMPLANT, POSTERIOR SEGMENT, INTRAOCULAR;  Surgeon: Maximilian Montaño MD;  Location: [facility] OR 1ST FLR;  Service: Ophthalmology;  Laterality: Right;    REPAIR OF RETINAL DETACHMENT WITH VITRECTOMY Right 1/25/2022    Procedure: REPAIR, RETINAL DETACHMENT, WITH VITRECTOMY, MEMBRANE PEEL, LASER, INJECTION OF GAS VS OIL;  Surgeon: Maximilian Montaño MD;  Location: [facility] OR 1ST FLR;  Service: Ophthalmology;  Laterality: Right;    REVISION OF ARTERIOVENOUS FISTULA Left 12/10/2021    Procedure: REVISION, AV FISTULA with BVT;  Surgeon: Roberto Ryan MD;  Location: [facility] OR 2ND FLR;  Service: Peripheral Vascular;  Laterality: Left;    VITRECTOMY BY PARS PLANA APPROACH Right 2/1/2022    Procedure: VITRECTOMY, PARS PLANA APPROACH;  Surgeon: Maximilian Montaño MD;  Location: [facility] OR 1ST FLR;  Service: Ophthalmology;  Laterality: Right;    VITRECTOMY BY PARS PLANA APPROACH Right 5/31/2022    Procedure: VITRECTOMY, PARS PLANA APPROACH;  Surgeon: Maximilian Montaño MD;  Location: [facility] OR [address] [place] [address];  Service: Ophthalmology;  Laterality: Right;     Family History       Problem Relation (Age of Onset)    Diabetes Brother    Heart disease Father    Hypertension Mother          Tobacco Use    Smoking status: Never    Smokeless tobacco: Never   Substance and Sexual Activity    Alcohol use: No    Drug use: No    Sexual activity: Not Currently     Partners: Male     Comment: monogamous        Review of Systems  "  Constitutional:  Negative for activity change, chills, fatigue and fever.   HENT: Negative.  Negative for congestion.    Respiratory:  Negative for cough, chest tightness and shortness of breath.    Cardiovascular:  Negative for chest pain and leg swelling.   Gastrointestinal:  Negative for abdominal distention, abdominal pain, constipation, diarrhea and nausea.   Genitourinary:  Negative for difficulty urinating and dysuria.   Skin:  Negative for wound.   Allergic/Immunologic: Negative for immunocompromised state.   Neurological:  Negative for dizziness, weakness and headaches.   Psychiatric/Behavioral:  Negative for agitation and confusion.    Objective:     Vital Signs (Most Recent):  Temp: 98.1 °F (36.7 °C) (11/02/22 0039)  Pulse: 100 (11/02/22 0039)  Resp: 18 (11/02/22 0039)  BP: (!) 204/116 (11/02/22 0039)  SpO2: (!) 94 % (11/02/22 0039)    Height: 5' 4" (162.6 cm)  Weight: 66.4 kg (146 lb 6.2 oz)  Body mass index is 25.13 kg/m².     Physical Exam  Constitutional:       Appearance: Normal appearance.   HENT:      Mouth/Throat:      Pharynx: Oropharynx is clear.   Eyes:      Conjunctiva/sclera: Conjunctivae normal.   Cardiovascular:      Rate and Rhythm: Normal rate and regular rhythm.   Pulmonary:      Effort: Pulmonary effort is normal.      Breath sounds: Normal breath sounds.   Abdominal:      General: Bowel sounds are normal.      Palpations: Abdomen is soft.   Musculoskeletal:      Comments: LUE AV HD access   Skin:     General: Skin is warm and dry.   Neurological:      Mental Status: She is alert and oriented to person, place, and time.   Psychiatric:         Mood and Affect: Mood normal.         Behavior: Behavior normal.       Laboratory  Labs within the past 24 hours have been reviewed.    Diagnostic Results:  CXR to be reviewed    Patient was SARS-CoV-2 /COVID-19 tested with negative results.     Assessment/Plan:     * Type 1 diabetes mellitus with end-stage renal disease (ESRD)  - Presents as " direct admit for kidney/pancreas transplant  - Active on [facility] transplant list since 5/31/21  - TTS HD, last HD 11/1  - Tolerating HD well, 4 hours per session. LUE AV access  - Dry wt 61 kg  - Endocrine consulted  - Pre-op labs/imaging ordered, to be reviewed prior to transplant  - No OR times yet  - Thymo          The patient presents for kidney/pancreas transplant.  There are no apparent contraindications to proceeding with the planned transplant.  The patient understands that the transplant could potentially be cancelled pending detailed assessment of the donor organ.  She will receive Thymoglobulin induction.  A complete discussion of the transplant procedure, including risks, complications, and alternatives, as well as any donor-specific risk factors requiring specific disclosure, will be carried out by the responsible staff surgeon prior to the procedure.     Discharge Planning:  Not suitable at this time    Tavo Roberts, MARILYN  Kidney Transplant  [facility] - [facility]plant [facility]

## 2022-11-02 NOTE — PROGRESS NOTES
Patient arrived in a wheelchair to dialysis unit.   Report received as documented in PER Handoff on Doc Flowsheet.  VS's per Doc Flowsheet.    Insulin infusing at 1.6 units/hr upon arrival.  Will monitor Q1H accu checks and titrate accordingly.     Hemodialysis initiated using the following:    Dialysis Access: left upper arm fistula    Needle size: 15 gauge X2  Insertion with no complications.    Will Maintain telemetry and blood pressure monitoring throughout treatment.  Refer to flowsheet and MAR for details.

## 2022-11-02 NOTE — NURSING
Pt arrived to TSU room 89929 at this time.  AAO4, ambulates independently.  /116 standing.  All other VSS.  No wounds or skin breakdown noted.  MARILYN Andrew, notified of pt arrival.

## 2022-11-02 NOTE — SUBJECTIVE & OBJECTIVE
Interval HPI:   Overnight events:No acute events overnight. Patient in room 94331/72370 A. Blood glucose worsening. BG at, above, and below goal on current insulin regimen (SSI ). Steroid use- None .      Renal function- Abnormal - Cr 6.5   Vasopressors-  None     Diet NPO     Eating:   NPO  Nausea: No  Hypoglycemia and intervention: Yes on admit prior to starting insulin here. BG of 58 improved on 16 g of Dextrose  Fever: No  TPN and/or TF: No    PMH, PSH, FH, SH updated and reviewed     ROS:    Review of Systems   Constitutional:  Negative for unexpected weight change.   Eyes:  Negative for visual disturbance.   Respiratory:  Negative for cough.    Cardiovascular:  Negative for chest pain.   Gastrointestinal:  Negative for nausea and vomiting.   Endocrine: Negative for polydipsia and polyuria.   Musculoskeletal:  Negative for back pain.   Skin:  Negative for rash.   Neurological:  Negative for syncope.   Psychiatric/Behavioral:  Negative for agitation and dysphoric mood.      Current Medications and/or Treatments Impacting Glycemic Control  Immunotherapy:    Immunosuppressants           Stop Route Frequency     antithymocyte globulin (rabbit) 100 mg, hydrocortisone sodium succinate (SOLU-CORTEF) 20 mg in sodium chloride 0.9% 500 mL (FOR PERIPHERAL LINE ADMINISTRATION ONLY)         -- IV Once          Steroids:   Hormones (From admission, onward)      Start     Stop Route Frequency Ordered    11/02/22 0145  antithymocyte globulin (rabbit) 100 mg, hydrocortisone sodium succinate (SOLU-CORTEF) 20 mg in sodium chloride 0.9% 500 mL (FOR PERIPHERAL LINE ADMINISTRATION ONLY)  (IP TXP INTRA-OP THYMO - PERIPHERAL THYMO PRECHECKED)         -- IV Once 11/02/22 0037    11/02/22 0030  methylPREDNISolone sodium succinate (SOLU-MEDROL) 500 mg in dextrose 5 % 100 mL IVPB  (IP TXP INTRA-OP THYMO - PERIPHERAL THYMO PRECHECKED)         -- IV Once 11/02/22 0026          Pressors:    Autonomic Drugs (From admission, onward)      None           Hyperglycemia/Diabetes Medications:   Antihyperglycemics (From admission, onward)      Start     Stop Route Frequency Ordered    11/02/22 1415  insulin regular in 0.9 % NaCl 100 unit/100 mL (1 unit/mL) infusion        Question:  Enter initial dose from Infusion Protocol Chart (Units/hr):  Answer:  2    -- IV Continuous 11/02/22 1407             PHYSICAL EXAMINATION:  Vitals:    11/02/22 1214   BP: 128/72   Pulse: 94   Resp: 20   Temp: 99 °F (37.2 °C)     Body mass index is 25.13 kg/m².    Physical Exam  Constitutional:       General: She is not in acute distress.     Appearance: Normal appearance. She is not ill-appearing.   HENT:      Head: Normocephalic and atraumatic.      Right Ear: External ear normal.      Left Ear: External ear normal.      Nose: Nose normal.   Cardiovascular:      Rate and Rhythm: Normal rate and regular rhythm.      Heart sounds: No murmur heard.  Pulmonary:      Effort: Pulmonary effort is normal. No respiratory distress.   Abdominal:      General: Bowel sounds are normal. There is no distension.      Tenderness: There is no abdominal tenderness.   Musculoskeletal:         General: No swelling.      Right lower leg: No edema.      Left lower leg: No edema.   Skin:     Findings: No erythema.   Neurological:      General: No focal deficit present.      Mental Status: She is alert and oriented to person, place, and time.   Psychiatric:         Mood and Affect: Mood normal.         Behavior: Behavior normal.

## 2022-11-02 NOTE — TELEPHONE ENCOUNTER
ON-CALL NOTE    OS# [id number]    Notified by Hemant Blackwood, , that Isabela Read is eligible for kidney/pancreas offer.  Spoke with patient and identified no acute medical issues with telephone assessment. Protocol script read to patient regarding N/A, standard donor offer. Patient verbalized understanding, all questions answered, patient accepts organ offer. Notified by Hemant Blackwood that virtual crossmatch is negative.  Current sample of blood is available from date 10/4/22 for crossmatch.  Patient reports no sensitizing event since last blood sample for PRA received. Notified Roxanne in [facility] to perform a retrospective  crossmatch per guideline.    Patient was asked if they have had a positive COVID-19 test or if they have any signs or symptoms. Informed patient that they will be tested for COVID-19 upon arrival to the hospital, unless have a previous positive result. If tested and result is positive, the transplant will not be able to occur, they will be inactivated on the wait list for 28 days per protocol and required to quarantine.     Patient notified of plan and states understanding.  Dialysis unit to be notified.

## 2022-11-02 NOTE — CONSULTS
[facility]plant [facility]  Endocrinology  Diabetes Consult Note    Consult Requested by: Kumar Rodriges Jr., MD   Reason for admit: Type 1 diabetes mellitus with end-stage renal disease (ESRD)    HISTORY OF PRESENT ILLNESS:  Reason for Consult: Management of T1DM, Hyperglycemia     Surgical Procedure and Date: Kidney/Pancreas Transplant planned for 2022    Diabetes diagnosis year: 8 yrs ago    Home Diabetes Medications:  Levemir 10 units in the am and 12 units q HS, Humalog 10 units with meals + SSI ISF 1:35    How often checking glucose at home? >4 x day Prev used ally waiting on new sensors  BG readings on regimen: Variable low to mid 100s typically  Hypoglycemia on the regimen?  No  Missed doses on regimen?  No    Diabetes Complications include:     Diabetic nephropathy      Complicating diabetes co morbidities:   ESRD      HPI:    Patient is a 27 y.o. female with ESRD secondary to diabetic nephropathy and HTN who presents as a direct admit as primary candidate for a  donor kidney/pancreas transplant. OR times have not been established at this time.  Dr. Rodriges will be the primary surgeon. Thymo induction.  Endocrine consulted to assist with blood sugar management.            Interval HPI:   Overnight events:No acute events overnight. Patient in room 25695/95501 A. Blood glucose worsening. BG at, above, and below goal on current insulin regimen (SSI ). Steroid use- None .      Renal function- Abnormal - Cr 6.5   Vasopressors-  None     Diet NPO     Eating:   NPO  Nausea: No  Hypoglycemia and intervention: Yes on admit prior to starting insulin here. BG of 58 improved on 16 g of Dextrose  Fever: No  TPN and/or TF: No    PMH, PSH, FH, SH updated and reviewed     ROS:    Review of Systems   Constitutional:  Negative for unexpected weight change.   Eyes:  Negative for visual disturbance.   Respiratory:  Negative for cough.    Cardiovascular:  Negative for chest pain.   Gastrointestinal:   Negative for nausea and vomiting.   Endocrine: Negative for polydipsia and polyuria.   Musculoskeletal:  Negative for back pain.   Skin:  Negative for rash.   Neurological:  Negative for syncope.   Psychiatric/Behavioral:  Negative for agitation and dysphoric mood.      Current Medications and/or Treatments Impacting Glycemic Control  Immunotherapy:    Immunosuppressants           Stop Route Frequency     antithymocyte globulin (rabbit) 100 mg, hydrocortisone sodium succinate (SOLU-CORTEF) 20 mg in sodium chloride 0.9% 500 mL (FOR PERIPHERAL LINE ADMINISTRATION ONLY)         -- IV Once          Steroids:   Hormones (From admission, onward)      Start     Stop Route Frequency Ordered    11/02/22 0145  antithymocyte globulin (rabbit) 100 mg, hydrocortisone sodium succinate (SOLU-CORTEF) 20 mg in sodium chloride 0.9% 500 mL (FOR PERIPHERAL LINE ADMINISTRATION ONLY)  (IP TXP INTRA-OP THYMO - PERIPHERAL THYMO PRECHECKED)         -- IV Once 11/02/22 0037    11/02/22 0030  methylPREDNISolone sodium succinate (SOLU-MEDROL) 500 mg in dextrose 5 % 100 mL IVPB  (IP TXP INTRA-OP THYMO - PERIPHERAL THYMO PRECHECKED)         -- IV Once 11/02/22 0026          Pressors:    Autonomic Drugs (From admission, onward)      None          Hyperglycemia/Diabetes Medications:   Antihyperglycemics (From admission, onward)      Start     Stop Route Frequency Ordered    11/02/22 1415  insulin regular in 0.9 % NaCl 100 unit/100 mL (1 unit/mL) infusion        Question:  Enter initial dose from Infusion Protocol Chart (Units/hr):  Answer:  2    -- IV Continuous 11/02/22 1407             PHYSICAL EXAMINATION:  Vitals:    11/02/22 1214   BP: 128/72   Pulse: 94   Resp: 20   Temp: 99 °F (37.2 °C)     Body mass index is 25.13 kg/m².    Physical Exam  Constitutional:       General: She is not in acute distress.     Appearance: Normal appearance. She is not ill-appearing.   HENT:      Head: Normocephalic and atraumatic.      Right Ear: External ear  normal.      Left Ear: External ear normal.      Nose: Nose normal.   Cardiovascular:      Rate and Rhythm: Normal rate and regular rhythm.      Heart sounds: No murmur heard.  Pulmonary:      Effort: Pulmonary effort is normal. No respiratory distress.   Abdominal:      General: Bowel sounds are normal. There is no distension.      Tenderness: There is no abdominal tenderness.   Musculoskeletal:         General: No swelling.      Right lower leg: No edema.      Left lower leg: No edema.   Skin:     Findings: No erythema.   Neurological:      General: No focal deficit present.      Mental Status: She is alert and oriented to person, place, and time.   Psychiatric:         Mood and Affect: Mood normal.         Behavior: Behavior normal.         Labs Reviewed and Include   Recent Labs   Lab 11/02/22  0038   *   CALCIUM 9.2   ALBUMIN 4.0   PROT 8.5*   *   K 4.8   CO2 26   CL 93*   BUN 40*   CREATININE 6.5*   ALKPHOS 73   ALT 16   AST 19   BILITOT 0.7     Lab Results   Component Value Date    WBC 6.29 11/02/2022    HGB 9.9 (L) 11/02/2022    HCT 30.0 (L) 11/02/2022    MCV 99 (H) 11/02/2022     11/02/2022     No results for input(s): TSH, FREET4 in the last 168 hours.  Lab Results   Component Value Date    HGBA1C 11.1 (H) 11/02/2022       Nutritional status:   Body mass index is 25.13 kg/m².  Lab Results   Component Value Date    ALBUMIN 4.0 11/02/2022    ALBUMIN 3.4 (L) 09/14/2022    ALBUMIN 3.6 08/01/2022     No results found for: PREALBUMIN    Estimated Creatinine Clearance: 12.2 mL/min (A) (based on SCr of 6.5 mg/dL (H)).    Accu-Checks  Recent Labs     11/02/22  0210 11/02/22  0302 11/02/22  0346 11/02/22  0604 11/02/22  1018 11/02/22  1201   POCTGLUCOSE 70 58* 100 184* 292* 403*        ASSESSMENT and PLAN    * Type 1 diabetes mellitus with end-stage renal disease (ESRD)  BG goal: 140-180    Type 1 DM arrived for potenital kidney/pancreas transplant.  Pt hypoglycemic upon arrival received 16 g  glucose with improvement.  Unclear reason hypoglycemic on arrival.  Does report some decreased PO intake.  Started on LDC by primary team.  Ate breakfast with just correction given prior, then made NPO for sx    - Start Transition insulin drip  at 0.4 with stepdown parameters,   -Continue Novolog Low dose correction with ISF 50 starting at 150    - POCT Glucose before meals, at bedtime and at 2 am   - Hypoglycemia protocol in place      ** Please notify Endocrine for any change and/or advance in diet**  ** Please call Endocrine for any BG related issues **     Discharge Planning:   TBD. Please notify endocrinology prior to discharge.     **Addendum**  Sugars continued to climb post breakfast into 400s.  Transition insulin drip and sliding scale stopped and pt started on intensive insulin drip at 2 units /hr.  POCT glucose to be done hourly.           Patient on waiting list for kidney-pancreas transplant  Per primary team.  Per notes plan for transplant later today.      End stage renal failure on dialysis  Titrate insulin slowly to avoid hypoglycemia as the risk of hypoglycemia increases with decreased creatinine clearance.              Plan discussed with patient, family, and RN at bedside.     Deacon Cervantes PA-C  Endocrinology  [facility] - Transplant [facility]

## 2022-11-02 NOTE — PLAN OF CARE
Problem: Fluid and Electrolyte Imbalance (Acute Kidney Injury/Impairment)  Goal: Fluid and Electrolyte Balance  Outcome: Ongoing, Progressing     Problem: Device-Related Complication Risk (Hemodialysis)  Goal: Safe, Effective Therapy Delivery  Outcome: Ongoing, Progressing

## 2022-11-02 NOTE — NURSING
0200 accucheck 70.  Provided ginger ale, crackers, and peanut butter to pt.  Recheck @ 0300 was 58.  16 g glucose tabs administered.  Pt is asymptomatic. MARILYN Andrew, aware. [facility]     0345 BG recheck 100

## 2022-11-02 NOTE — ASSESSMENT & PLAN NOTE
BG goal: 140-180    Type 1 DM arrived for potenital kidney/pancreas transplant.  Pt hypoglycemic upon arrival received 16 g glucose with improvement.  Unclear reason hypoglycemic on arrival.  Does report some decreased PO intake.  Started on LDC by primary team.  Ate breakfast with just correction given prior, then made NPO for sx    - Start Transition insulin drip  at 0.4 with stepdown parameters,   -Continue Novolog Low dose correction with ISF 50 starting at 150    - POCT Glucose before meals, at bedtime and at 2 am   - Hypoglycemia protocol in place      ** Please notify Endocrine for any change and/or advance in diet**  ** Please call Endocrine for any BG related issues **     Discharge Planning:   TBD. Please notify endocrinology prior to discharge.     **Addendum**  Sugars continued to climb post breakfast into 400s.  Transition insulin drip and sliding scale stopped and pt started on intensive insulin drip at 2 units /hr.  POCT glucose to be done hourly.

## 2022-11-02 NOTE — PROGRESS NOTES
Hemodialysis treatment completed.    Treatment time received: 2 hours    Net fluid removed: 2692 ml    Tolerated Treatment well. VSS. No acute distress.    Insulin titrated per MAR and currently running at 0.8 units/hr.     Needles X2 removed and site de-accessed.  Pressure held till hemostasis obtained.  Placed gauze and tape dressing to site.  Fistual with continued bruit and thrill post treatment.    Report given as documented in PER Handoff on Doc Flowsheet  Refer to flowsheet and MAR for details.  Patient transported back in wheelchair from Dialysis to primary unit.

## 2022-11-02 NOTE — TELEPHONE ENCOUNTER
SW contacted pt regarding caregiver plan. Pt reports her mother will act as caregiver if pt is transplanted. Pt provided permission for SW to confirm plan with caregiver. SW spoke with pt's mother , who reports she will act as primary caregiver. SW provided in depth caregiver education. Caregiver verbalized understanding.      Caregiver Information:   Name: Kiersten Garcia  Relationship: mother  Address: [address] [place] [address]  Phone Numbers:  n/a (home), [phone] (mobile)  Availability: flexible  Transportation: yes, drives own vehicle

## 2022-11-02 NOTE — HPI
"Ms. Read is a 27 y.o. year old  female with ESRD secondary to diabetic nephropathy and HTN.  She has been on the wait list for a kidney transplant at [facility] since 2021. Patient is currently on hemodialysis started on 21. Patient is dialyzing on TTS schedule, last HD 22. Patient reports that she is tolerating dialysis well. She has a LUE AV fistula. She is dialyzing 4 hours per session. Dry weight 61 kg. She reports urinating usually once per day ("normal" amount per urination)    She now presents as a direct admit as primary candidate for a  donor kidney/pancreas transplant. OR times have not been established at this time. Pt is currently not NPO. Dr. Rodriges will be the primary surgeon. Thymo induction. Pre-op labs and imaging have been ordered and will be reviewed prior to transplant. Endocrine consulted to assist with blood sugar management.  "

## 2022-11-02 NOTE — PROGRESS NOTES
Nutrition-Related Diabetes Education      Time Spent: 10 min    Learners: Pt    Current HbA1c: 11.1    Is patient aware of their A1c and their goal A1c? Yes    Home diabetes medication(s):   Humalog 10 units with meals  Levemir 10 units AM, 12 units PM    Nutrition Education with handouts: Educated pt on CHO counting for people with diabetes. Pt verbalized understanding. Emphasized the importance of diet adherence. Pt with no additional questions. No other needs identified. Left all education material with pt at bedside.    Comments: Pt reports good appetite PTA, usually eats 2-3 meals per day. Wt stable. Denies any significant wt changes. No indicators of malnutrition.    Barriers to Learning: No    Follow up: Yes    Please consult as needed.  Thank you!    Arabella HAYWOODN

## 2022-11-02 NOTE — PROGRESS NOTES
Pre-operative Discussion Note  Kidney Transplant Surgery    Isabela Read is a 27 y.o. female with ESRD, requiring chronic dialysis admitted for kidney/pancreas transplant.  I discussed the planned procedure in detail, including expected hospital course and outcomes, benefits, risks, and potential complications.  Complications discussed included death, graft failure, bleeding, infection, vascular thrombosis, and rejection.  I discussed the risks of anesthesia, as well as the potential need for re-operation.  The possibility of other complications not specifically mentioned was also discussed.  Also, I discussed the need for lifelong immunosuppression and the possibility of serious complications from immunosuppressive drugs.    The discussion included the risks that the patient will incur if she elects to not have the proposed procedure.    Relevant donor-specific risk factors were disclosed and discussed with the patient, including:   none    Specific PHS donor risk criteria for the organ donor include:  None    HCV: n/a    The patient was SARS-CoV-2 /COVID-19 tested with negative results.    COVID-19: I discussed the possibility of COVID-19 transmission with the patient. Although KEE testing is available for the virus using a technique which in theory should be very accurate, there is no data yet regarding the likelihood of a  false-negative test leading to virus transmission. Based on accuracy of testing for other viruses, it is expected that this risk is extremely small.     I also discussed that transplant immunosuppression will increase susceptibility to COVID-19 and other viruses, and that although we use stringent precautions to protect patients from infection, it is possible for a transplant recipient to contract this infection. If COVID-19 infection should occur, it would be a serious matter with a significant risk of death.    All questions were answered.  The patient and available family members  voice understanding and agree to proceed with the transplant.    UNOS Patient Status  Note on scores:  ICU = 10 = total assistance  TSU = 20-30 = partial assistance  Outpatient admitted for transplant requiring medical care in last year = 40-50 = partial assistance  Scores 60 or higher indicate no assistance, meaning no need for medical care in last year. This would be very unusual for a transplant candidate.    UNOS Patient Status  Functional Status: 50% - Requires considerable assistance and frequent medical care  Physical Capacity: No Limitations    Frequent hypoglycemia, using pens, feels full easily but no vomiting, no neuropathy.  DMt1 since age of 8, no prior surgeries.   Discussed the above with her in her hospital room approx 10am today.   Kumar Rodriges Jr

## 2022-11-02 NOTE — PROGRESS NOTES
Admit Note     Met with patient and mother to assess needs. Patient is a 27 y.o. single female, admitted for for kidney and pancreas transplant.      Type 1 diabetes mellitus without complications [E10.9]  Pre-transplant evaluation for kidney and pancreas transplant [Z01.818]      Patient admitted from home on 11/2/2022 .  At this time, patient presents as alert and oriented x 4, pleasant, good eye contact, well groomed, recall good, concentration/judgement good, average intelligence, calm, communicative, cooperative, and asking and answering questions appropriately.  At this time, patients caregiver presents as alert and oriented x 4, pleasant, good eye contact, well groomed, recall good, concentration/judgement good, average intelligence, calm, communicative, cooperative, and asking and answering questions appropriately.    Household/Family Systems     Patient resides with patient's mother and brother, at [address] [place] [address].  Support system includes her mother, her brother Mustapha, and her uncle Florentin .  Patient does not have dependents that are need of being cared for.     Patients primary caregiver is Kiersten Radha, patients mother, phone number [phone].  Confirmed patients contact information is [phone] (home);   Telephone Information:   Mobile [phone]   .    During admission, patient's caregiver plans to stay in patient's room.  Confirmed patient and patients caregivers do have access to reliable transportation.    Cognitive Status/Learning     Patient reports reading ability as 12th grade and states patient does not have difficulty with N/A.  Patient reports patient learns best by visual.   Needed: No.   Highest education level: High School (9-12) or GED    Vocation/Disability   .  Working for Income: No  If no, reason not working: Disability  Patient is disabled due to ESRD since 2020.  Prior to disability, patient  was employed at [employer].    Adherence     Patient reports a high level of adherence to patients health care regimen.  Adherence counseling and education provided. Patient verbalizes understanding.    Substance Use    Patient reports the following substance usage.    Tobacco: none, patient denies any use.  Alcohol: none, patient denies any use.  Illicit Drugs/Non-prescribed Medications: none, patient denies any use.  Patient states clear understanding of the potential impact of substance use.  Substance abstinence/cessation counseling, education and resources provided and reviewed.     Services Utilizing/ADLS    Infusion Service: Prior to admission, patient utilizing? no  Home Health: Prior to admission, patient utilizing? no  DME: Prior to admission, no  Pulmonary/Cardiac Rehab: Prior to admission, no  Dialysis:  Prior to admission, yes  Transplant Specialty Pharmacy:  Prior to admission, no; patient provides permission to release necessary information to specialty pharmacy for medications post-tranplant. Resources provided and patient is choosing [facility] pharmacy at this time.    Prior to admission, patient reports patient was independent with ADLS and was driving.  Patient reports patient is not able to care for self at this time due to compromised medical condition (as documented in medical record) and physical weakness..  Patient indicates a willingness to care for self once medically cleared to do so.    Insurance/Medications    Insured by   Payer/Plan Subscr  Sex Relation Sub. Ins. ID Effective Group Num   1. [employer]* SIXTO CARNEY* 1995 Female Self [id number]                                   [address]   2. [place]* SIXTO CARNEY* 1995 Female Self [id number]* [id number]                                   [address] [address]      Primary Insurance (for UNOS reporting): Public Insurance - Medicare FFS (Fee For Service)  Secondary Insurance (for UNOS reporting): Public Insurance -  Medicaid    Patient reports patient is able to obtain and afford medications at this time and at time of discharge.Patient's mother did note that they would benefit from utility assistance.  will provide patient and mother with resources.     Living Will/Healthcare Power of     Patient states patient does not have a LW and/or HCPA.   provided education regarding LW and HCPA and the completion of forms.    Coping/Mental Health    Patient is coping adequately with the aid of  family members and friends.  Patient denies mental health difficulties. Patient's previous assessment stated patient was taking Lexapro. Patient denied taking lexapro at this time. Patient reported her anxiety and depression symptoms have been well managed. Patient denied any current or past thoughts of suicidal or homicidal ideation.  provided patient's caregiver with her card and educated patient and caregiver on how to contact her.    Discharge Planning    At time of discharge, patient plans to return to patient's home under the care of Kiersten Garcia.  Patients mother will transport patient.  Per rounds today, expected discharge date has not been medically determined at this time. Patient and patients caregiver  verbalize understanding and are involved in treatment planning and discharge process.    Additional Concerns    Patient's caretaker denies additional needs and/or concerns at this time.  providing ongoing psychosocial support, education, resources and d/c planning as needed.  SW remains available.  remains available. Patient's caregiver verbalizes understanding and agreement with information reviewed,  availability and how to access available resources as needed. Patient denies additional needs and/or concerns at this time. Patient verbalizes understanding and agreement with information reviewed, social work availability, and how to access  available resources as needed.

## 2022-11-03 PROBLEM — Z94.0 STATUS POST SIMULTANEOUS KIDNEY AND PANCREAS TRANSPLANT: Status: ACTIVE | Noted: 2022-11-03

## 2022-11-03 PROBLEM — Z91.89 AT RISK FOR OPPORTUNISTIC INFECTIONS: Status: ACTIVE | Noted: 2022-11-03

## 2022-11-03 PROBLEM — Z29.89 PROPHYLACTIC IMMUNOTHERAPY: Status: ACTIVE | Noted: 2022-11-03

## 2022-11-03 PROBLEM — Z76.82 PATIENT ON WAITING LIST FOR KIDNEY TRANSPLANT: Chronic | Status: RESOLVED | Noted: 2022-08-05 | Resolved: 2022-11-03

## 2022-11-03 PROBLEM — Z94.83 STATUS POST SIMULTANEOUS KIDNEY AND PANCREAS TRANSPLANT: Status: ACTIVE | Noted: 2022-11-03

## 2022-11-03 LAB
ALLENS TEST: ABNORMAL
AMYLASE SERPL-CCNC: 203 U/L (ref 20–110)
AMYLASE SERPL-CCNC: 332 U/L (ref 20–110)
AMYLASE SERPL-CCNC: 481 U/L (ref 20–110)
ANION GAP SERPL CALC-SCNC: 14 MMOL/L (ref 8–16)
ANION GAP SERPL CALC-SCNC: 16 MMOL/L (ref 8–16)
ANION GAP SERPL CALC-SCNC: 16 MMOL/L (ref 8–16)
BASOPHILS # BLD AUTO: 0 K/UL (ref 0–0.2)
BASOPHILS # BLD AUTO: 0.01 K/UL (ref 0–0.2)
BASOPHILS # BLD AUTO: 0.01 K/UL (ref 0–0.2)
BASOPHILS NFR BLD: 0 % (ref 0–1.9)
BASOPHILS NFR BLD: 0.1 % (ref 0–1.9)
BASOPHILS NFR BLD: 0.1 % (ref 0–1.9)
BUN SERPL-MCNC: 36 MG/DL (ref 6–20)
BUN SERPL-MCNC: 36 MG/DL (ref 6–20)
BUN SERPL-MCNC: 39 MG/DL (ref 6–20)
CA-I BLDV-SCNC: 1.02 MMOL/L (ref 1.06–1.42)
CALCIUM SERPL-MCNC: 8.5 MG/DL (ref 8.7–10.5)
CALCIUM SERPL-MCNC: 8.7 MG/DL (ref 8.7–10.5)
CALCIUM SERPL-MCNC: 8.8 MG/DL (ref 8.7–10.5)
CHLORIDE SERPL-SCNC: 104 MMOL/L (ref 95–110)
CHLORIDE SERPL-SCNC: 106 MMOL/L (ref 95–110)
CHLORIDE SERPL-SCNC: 99 MMOL/L (ref 95–110)
CO2 SERPL-SCNC: 19 MMOL/L (ref 23–29)
CO2 SERPL-SCNC: 21 MMOL/L (ref 23–29)
CO2 SERPL-SCNC: 22 MMOL/L (ref 23–29)
CREAT SERPL-MCNC: 4.1 MG/DL (ref 0.5–1.4)
CREAT SERPL-MCNC: 4.3 MG/DL (ref 0.5–1.4)
CREAT SERPL-MCNC: 5.1 MG/DL (ref 0.5–1.4)
DELSYS: ABNORMAL
DIFFERENTIAL METHOD: ABNORMAL
EOSINOPHIL # BLD AUTO: 0 K/UL (ref 0–0.5)
EOSINOPHIL NFR BLD: 0 % (ref 0–8)
ERYTHROCYTE [DISTWIDTH] IN BLOOD BY AUTOMATED COUNT: 14.1 % (ref 11.5–14.5)
ERYTHROCYTE [DISTWIDTH] IN BLOOD BY AUTOMATED COUNT: 14.2 % (ref 11.5–14.5)
ERYTHROCYTE [DISTWIDTH] IN BLOOD BY AUTOMATED COUNT: 14.4 % (ref 11.5–14.5)
EST. GFR  (NO RACE VARIABLE): 11.2 ML/MIN/1.73 M^2
EST. GFR  (NO RACE VARIABLE): 13.8 ML/MIN/1.73 M^2
EST. GFR  (NO RACE VARIABLE): 14.6 ML/MIN/1.73 M^2
FIO2: 21
GLUCOSE SERPL-MCNC: 113 MG/DL (ref 70–110)
GLUCOSE SERPL-MCNC: 119 MG/DL (ref 70–110)
GLUCOSE SERPL-MCNC: 174 MG/DL (ref 70–110)
GLUCOSE SERPL-MCNC: 175 MG/DL (ref 70–110)
GLUCOSE SERPL-MCNC: 190 MG/DL (ref 70–110)
GLUCOSE SERPL-MCNC: 235 MG/DL (ref 70–110)
GLUCOSE SERPL-MCNC: 270 MG/DL (ref 70–110)
GLUCOSE SERPL-MCNC: 307 MG/DL (ref 70–110)
GLUCOSE SERPL-MCNC: 371 MG/DL (ref 70–110)
GLUCOSE SERPL-MCNC: 386 MG/DL (ref 70–110)
GLUCOSE SERPL-MCNC: 389 MG/DL (ref 70–110)
GLUCOSE SERPL-MCNC: 434 MG/DL (ref 70–110)
GLUCOSE SERPL-MCNC: 458 MG/DL (ref 70–110)
HCO3 UR-SCNC: 19.4 MMOL/L (ref 24–28)
HCO3 UR-SCNC: 19.6 MMOL/L (ref 24–28)
HCO3 UR-SCNC: 19.9 MMOL/L (ref 24–28)
HCO3 UR-SCNC: 20.5 MMOL/L (ref 24–28)
HCO3 UR-SCNC: 21.4 MMOL/L (ref 24–28)
HCO3 UR-SCNC: 21.5 MMOL/L (ref 24–28)
HCO3 UR-SCNC: 21.7 MMOL/L (ref 24–28)
HCO3 UR-SCNC: 21.9 MMOL/L (ref 24–28)
HCO3 UR-SCNC: 24 MMOL/L (ref 24–28)
HCO3 UR-SCNC: 24.7 MMOL/L (ref 24–28)
HCO3 UR-SCNC: 25 MMOL/L (ref 24–28)
HCO3 UR-SCNC: 25.4 MMOL/L (ref 24–28)
HCO3 UR-SCNC: 26.7 MMOL/L (ref 24–28)
HCT VFR BLD AUTO: 24.4 % (ref 37–48.5)
HCT VFR BLD AUTO: 26.9 % (ref 37–48.5)
HCT VFR BLD AUTO: 28.3 % (ref 37–48.5)
HCT VFR BLD CALC: 23 %PCV (ref 36–54)
HCT VFR BLD CALC: 25 %PCV (ref 36–54)
HCT VFR BLD CALC: 26 %PCV (ref 36–54)
HCT VFR BLD CALC: 27 %PCV (ref 36–54)
HCT VFR BLD CALC: 28 %PCV (ref 36–54)
HCT VFR BLD CALC: 29 %PCV (ref 36–54)
HGB BLD-MCNC: 8.3 G/DL (ref 12–16)
HGB BLD-MCNC: 9.3 G/DL (ref 12–16)
HGB BLD-MCNC: 9.7 G/DL (ref 12–16)
IMM GRANULOCYTES # BLD AUTO: 0.04 K/UL (ref 0–0.04)
IMM GRANULOCYTES # BLD AUTO: 0.04 K/UL (ref 0–0.04)
IMM GRANULOCYTES # BLD AUTO: 0.06 K/UL (ref 0–0.04)
IMM GRANULOCYTES NFR BLD AUTO: 0.3 % (ref 0–0.5)
IMM GRANULOCYTES NFR BLD AUTO: 0.4 % (ref 0–0.5)
IMM GRANULOCYTES NFR BLD AUTO: 0.5 % (ref 0–0.5)
LIPASE SERPL-CCNC: 211 U/L (ref 4–60)
LIPASE SERPL-CCNC: 879 U/L (ref 4–60)
LIPASE SERPL-CCNC: 98 U/L (ref 4–60)
LYMPHOCYTES # BLD AUTO: 0.2 K/UL (ref 1–4.8)
LYMPHOCYTES NFR BLD: 1.1 % (ref 18–48)
LYMPHOCYTES NFR BLD: 1.5 % (ref 18–48)
LYMPHOCYTES NFR BLD: 1.7 % (ref 18–48)
MAGNESIUM SERPL-MCNC: 1.9 MG/DL (ref 1.6–2.6)
MAGNESIUM SERPL-MCNC: 2 MG/DL (ref 1.6–2.6)
MAGNESIUM SERPL-MCNC: 2 MG/DL (ref 1.6–2.6)
MCH RBC QN AUTO: 32.5 PG (ref 27–31)
MCH RBC QN AUTO: 32.9 PG (ref 27–31)
MCH RBC QN AUTO: 33.1 PG (ref 27–31)
MCHC RBC AUTO-ENTMCNC: 34 G/DL (ref 32–36)
MCHC RBC AUTO-ENTMCNC: 34.3 G/DL (ref 32–36)
MCHC RBC AUTO-ENTMCNC: 34.6 G/DL (ref 32–36)
MCV RBC AUTO: 96 FL (ref 82–98)
MODE: ABNORMAL
MODE: ABNORMAL
MONOCYTES # BLD AUTO: 0.1 K/UL (ref 0.3–1)
MONOCYTES # BLD AUTO: 0.3 K/UL (ref 0.3–1)
MONOCYTES # BLD AUTO: 0.4 K/UL (ref 0.3–1)
MONOCYTES NFR BLD: 0.6 % (ref 4–15)
MONOCYTES NFR BLD: 2.3 % (ref 4–15)
MONOCYTES NFR BLD: 2.4 % (ref 4–15)
NEUTROPHILS # BLD AUTO: 10.6 K/UL (ref 1.8–7.7)
NEUTROPHILS # BLD AUTO: 11.8 K/UL (ref 1.8–7.7)
NEUTROPHILS # BLD AUTO: 14.8 K/UL (ref 1.8–7.7)
NEUTROPHILS NFR BLD: 95.5 % (ref 38–73)
NEUTROPHILS NFR BLD: 96.2 % (ref 38–73)
NEUTROPHILS NFR BLD: 97.3 % (ref 38–73)
NRBC BLD-RTO: 0 /100 WBC
PCO2 BLDA: 28.9 MMHG (ref 35–45)
PCO2 BLDA: 29.7 MMHG (ref 35–45)
PCO2 BLDA: 29.9 MMHG (ref 35–45)
PCO2 BLDA: 30.2 MMHG (ref 35–45)
PCO2 BLDA: 30.3 MMHG (ref 35–45)
PCO2 BLDA: 30.6 MMHG (ref 35–45)
PCO2 BLDA: 32.4 MMHG (ref 35–45)
PCO2 BLDA: 34.8 MMHG (ref 35–45)
PCO2 BLDA: 36.2 MMHG (ref 35–45)
PCO2 BLDA: 38.4 MMHG (ref 35–45)
PCO2 BLDA: 39.5 MMHG (ref 35–45)
PCO2 BLDA: 40.9 MMHG (ref 35–45)
PCO2 BLDA: 41.3 MMHG (ref 35–45)
PH SMN: 7.36 [PH] (ref 7.35–7.45)
PH SMN: 7.36 [PH] (ref 7.35–7.45)
PH SMN: 7.37 [PH] (ref 7.35–7.45)
PH SMN: 7.38 [PH] (ref 7.35–7.45)
PH SMN: 7.39 [PH] (ref 7.35–7.45)
PH SMN: 7.41 [PH] (ref 7.35–7.45)
PH SMN: 7.42 [PH] (ref 7.35–7.45)
PH SMN: 7.45 [PH] (ref 7.35–7.45)
PH SMN: 7.46 [PH] (ref 7.35–7.45)
PH SMN: 7.46 [PH] (ref 7.35–7.45)
PH SMN: 7.47 [PH] (ref 7.35–7.45)
PH SMN: 7.48 [PH] (ref 7.35–7.45)
PH SMN: 7.53 [PH] (ref 7.35–7.45)
PHOSPHATE SERPL-MCNC: 2.9 MG/DL (ref 2.7–4.5)
PHOSPHATE SERPL-MCNC: 3.5 MG/DL (ref 2.7–4.5)
PHOSPHATE SERPL-MCNC: 4.2 MG/DL (ref 2.7–4.5)
PLATELET # BLD AUTO: 174 K/UL (ref 150–450)
PLATELET # BLD AUTO: 174 K/UL (ref 150–450)
PLATELET # BLD AUTO: 177 K/UL (ref 150–450)
PMV BLD AUTO: 12.2 FL (ref 9.2–12.9)
PMV BLD AUTO: 12.5 FL (ref 9.2–12.9)
PMV BLD AUTO: 12.6 FL (ref 9.2–12.9)
PO2 BLDA: 100 MMHG (ref 80–100)
PO2 BLDA: 103 MMHG (ref 80–100)
PO2 BLDA: 108 MMHG (ref 80–100)
PO2 BLDA: 108 MMHG (ref 80–100)
PO2 BLDA: 110 MMHG (ref 80–100)
PO2 BLDA: 114 MMHG (ref 80–100)
PO2 BLDA: 129 MMHG (ref 80–100)
PO2 BLDA: 141 MMHG (ref 80–100)
PO2 BLDA: 160 MMHG (ref 80–100)
PO2 BLDA: 455 MMHG (ref 80–100)
PO2 BLDA: 64 MMHG (ref 80–100)
PO2 BLDA: 90 MMHG (ref 80–100)
PO2 BLDA: 95 MMHG (ref 80–100)
POC BE: -1 MMOL/L
POC BE: -2 MMOL/L
POC BE: -5 MMOL/L
POC BE: -5 MMOL/L
POC BE: -6 MMOL/L
POC BE: -6 MMOL/L
POC BE: 0 MMOL/L
POC BE: 0 MMOL/L
POC BE: 3 MMOL/L
POC BE: 3 MMOL/L
POC IONIZED CALCIUM: 0.97 MMOL/L (ref 1.06–1.42)
POC IONIZED CALCIUM: 1.03 MMOL/L (ref 1.06–1.42)
POC IONIZED CALCIUM: 1.05 MMOL/L (ref 1.06–1.42)
POC IONIZED CALCIUM: 1.08 MMOL/L (ref 1.06–1.42)
POC IONIZED CALCIUM: 1.1 MMOL/L (ref 1.06–1.42)
POC IONIZED CALCIUM: 1.11 MMOL/L (ref 1.06–1.42)
POC IONIZED CALCIUM: 1.11 MMOL/L (ref 1.06–1.42)
POC IONIZED CALCIUM: 1.13 MMOL/L (ref 1.06–1.42)
POC IONIZED CALCIUM: 1.15 MMOL/L (ref 1.06–1.42)
POC IONIZED CALCIUM: 1.16 MMOL/L (ref 1.06–1.42)
POC SATURATED O2: 100 % (ref 95–100)
POC SATURATED O2: 92 % (ref 95–100)
POC SATURATED O2: 98 % (ref 95–100)
POC SATURATED O2: 99 % (ref 95–100)
POC TCO2: 20 MMOL/L (ref 23–27)
POC TCO2: 21 MMOL/L (ref 23–27)
POC TCO2: 21 MMOL/L (ref 23–27)
POC TCO2: 22 MMOL/L (ref 23–27)
POC TCO2: 23 MMOL/L (ref 23–27)
POC TCO2: 23 MMOL/L (ref 23–27)
POC TCO2: 25 MMOL/L (ref 23–27)
POC TCO2: 26 MMOL/L (ref 23–27)
POC TCO2: 28 MMOL/L (ref 23–27)
POCT GLUCOSE: 113 MG/DL (ref 70–110)
POCT GLUCOSE: 115 MG/DL (ref 70–110)
POCT GLUCOSE: 117 MG/DL (ref 70–110)
POCT GLUCOSE: 118 MG/DL (ref 70–110)
POCT GLUCOSE: 119 MG/DL (ref 70–110)
POCT GLUCOSE: 128 MG/DL (ref 70–110)
POCT GLUCOSE: 161 MG/DL (ref 70–110)
POCT GLUCOSE: 76 MG/DL (ref 70–110)
POCT GLUCOSE: 80 MG/DL (ref 70–110)
POCT GLUCOSE: 81 MG/DL (ref 70–110)
POCT GLUCOSE: 94 MG/DL (ref 70–110)
POTASSIUM BLD-SCNC: 3.6 MMOL/L (ref 3.5–5.1)
POTASSIUM BLD-SCNC: 3.8 MMOL/L (ref 3.5–5.1)
POTASSIUM BLD-SCNC: 4 MMOL/L (ref 3.5–5.1)
POTASSIUM BLD-SCNC: 4.2 MMOL/L (ref 3.5–5.1)
POTASSIUM BLD-SCNC: 4.4 MMOL/L (ref 3.5–5.1)
POTASSIUM BLD-SCNC: 4.5 MMOL/L (ref 3.5–5.1)
POTASSIUM BLD-SCNC: 4.5 MMOL/L (ref 3.5–5.1)
POTASSIUM BLD-SCNC: 4.8 MMOL/L (ref 3.5–5.1)
POTASSIUM BLD-SCNC: 4.9 MMOL/L (ref 3.5–5.1)
POTASSIUM BLD-SCNC: 5.1 MMOL/L (ref 3.5–5.1)
POTASSIUM SERPL-SCNC: 3.5 MMOL/L (ref 3.5–5.1)
POTASSIUM SERPL-SCNC: 3.6 MMOL/L (ref 3.5–5.1)
POTASSIUM SERPL-SCNC: 3.7 MMOL/L (ref 3.5–5.1)
RBC # BLD AUTO: 2.55 M/UL (ref 4–5.4)
RBC # BLD AUTO: 2.81 M/UL (ref 4–5.4)
RBC # BLD AUTO: 2.95 M/UL (ref 4–5.4)
SAMPLE: ABNORMAL
SITE: ABNORMAL
SODIUM BLD-SCNC: 133 MMOL/L (ref 136–145)
SODIUM BLD-SCNC: 133 MMOL/L (ref 136–145)
SODIUM BLD-SCNC: 135 MMOL/L (ref 136–145)
SODIUM BLD-SCNC: 136 MMOL/L (ref 136–145)
SODIUM BLD-SCNC: 136 MMOL/L (ref 136–145)
SODIUM BLD-SCNC: 137 MMOL/L (ref 136–145)
SODIUM SERPL-SCNC: 134 MMOL/L (ref 136–145)
SODIUM SERPL-SCNC: 141 MMOL/L (ref 136–145)
SODIUM SERPL-SCNC: 142 MMOL/L (ref 136–145)
WBC # BLD AUTO: 10.84 K/UL (ref 3.9–12.7)
WBC # BLD AUTO: 12.31 K/UL (ref 3.9–12.7)
WBC # BLD AUTO: 15.41 K/UL (ref 3.9–12.7)

## 2022-11-03 PROCEDURE — 93010 ELECTROCARDIOGRAM REPORT: CPT | Mod: ,,, | Performed by: INTERNAL MEDICINE

## 2022-11-03 PROCEDURE — 63600175 PHARM REV CODE 636 W HCPCS: Performed by: STUDENT IN AN ORGANIZED HEALTH CARE EDUCATION/TRAINING PROGRAM

## 2022-11-03 PROCEDURE — 63600175 PHARM REV CODE 636 W HCPCS: Performed by: NURSE ANESTHETIST, CERTIFIED REGISTERED

## 2022-11-03 PROCEDURE — 25000003 PHARM REV CODE 250: Performed by: CLINICAL NURSE SPECIALIST

## 2022-11-03 PROCEDURE — 82803 BLOOD GASES ANY COMBINATION: CPT

## 2022-11-03 PROCEDURE — 83735 ASSAY OF MAGNESIUM: CPT | Performed by: TRANSPLANT SURGERY

## 2022-11-03 PROCEDURE — 93010 EKG 12-LEAD: ICD-10-PCS | Mod: ,,, | Performed by: INTERNAL MEDICINE

## 2022-11-03 PROCEDURE — 25000003 PHARM REV CODE 250: Performed by: TRANSPLANT SURGERY

## 2022-11-03 PROCEDURE — 37799 UNLISTED PX VASCULAR SURGERY: CPT

## 2022-11-03 PROCEDURE — 99233 SBSQ HOSP IP/OBS HIGH 50: CPT | Mod: 24,,, | Performed by: STUDENT IN AN ORGANIZED HEALTH CARE EDUCATION/TRAINING PROGRAM

## 2022-11-03 PROCEDURE — 76937 US GUIDE VASCULAR ACCESS: CPT

## 2022-11-03 PROCEDURE — 25000003 PHARM REV CODE 250: Performed by: NURSE ANESTHETIST, CERTIFIED REGISTERED

## 2022-11-03 PROCEDURE — 85025 COMPLETE CBC W/AUTO DIFF WBC: CPT | Mod: 91 | Performed by: STUDENT IN AN ORGANIZED HEALTH CARE EDUCATION/TRAINING PROGRAM

## 2022-11-03 PROCEDURE — S5010 5% DEXTROSE AND 0.45% SALINE: HCPCS | Performed by: STUDENT IN AN ORGANIZED HEALTH CARE EDUCATION/TRAINING PROGRAM

## 2022-11-03 PROCEDURE — 63600175 PHARM REV CODE 636 W HCPCS: Performed by: CLINICAL NURSE SPECIALIST

## 2022-11-03 PROCEDURE — 81200001 HC KIDNEY ACQUISITION - CADAVER

## 2022-11-03 PROCEDURE — 63600175 PHARM REV CODE 636 W HCPCS: Performed by: TRANSPLANT SURGERY

## 2022-11-03 PROCEDURE — 81300002 HC KIDNEY TRANSPORT, GROUND 4-5 HOURS

## 2022-11-03 PROCEDURE — 83735 ASSAY OF MAGNESIUM: CPT | Mod: 91 | Performed by: STUDENT IN AN ORGANIZED HEALTH CARE EDUCATION/TRAINING PROGRAM

## 2022-11-03 PROCEDURE — 93005 ELECTROCARDIOGRAM TRACING: CPT

## 2022-11-03 PROCEDURE — P9047 ALBUMIN (HUMAN), 25%, 50ML: HCPCS | Mod: JG | Performed by: STUDENT IN AN ORGANIZED HEALTH CARE EDUCATION/TRAINING PROGRAM

## 2022-11-03 PROCEDURE — 25000003 PHARM REV CODE 250

## 2022-11-03 PROCEDURE — 25000003 PHARM REV CODE 250: Performed by: STUDENT IN AN ORGANIZED HEALTH CARE EDUCATION/TRAINING PROGRAM

## 2022-11-03 PROCEDURE — 97163 PT EVAL HIGH COMPLEX 45 MIN: CPT

## 2022-11-03 PROCEDURE — 81300003 HC PANCREAS TRANSPORT, GROUND 4-5 HOURS

## 2022-11-03 PROCEDURE — 99233 SBSQ HOSP IP/OBS HIGH 50: CPT | Mod: ,,, | Performed by: PHYSICIAN ASSISTANT

## 2022-11-03 PROCEDURE — 82150 ASSAY OF AMYLASE: CPT | Performed by: STUDENT IN AN ORGANIZED HEALTH CARE EDUCATION/TRAINING PROGRAM

## 2022-11-03 PROCEDURE — 36410 VNPNXR 3YR/> PHY/QHP DX/THER: CPT

## 2022-11-03 PROCEDURE — 82800 BLOOD PH: CPT

## 2022-11-03 PROCEDURE — 99233 PR SUBSEQUENT HOSPITAL CARE,LEVL III: ICD-10-PCS | Mod: ,,, | Performed by: PHYSICIAN ASSISTANT

## 2022-11-03 PROCEDURE — 83690 ASSAY OF LIPASE: CPT | Mod: 91 | Performed by: STUDENT IN AN ORGANIZED HEALTH CARE EDUCATION/TRAINING PROGRAM

## 2022-11-03 PROCEDURE — 25000003 PHARM REV CODE 250: Mod: NTX | Performed by: CLINICAL NURSE SPECIALIST

## 2022-11-03 PROCEDURE — 84100 ASSAY OF PHOSPHORUS: CPT | Mod: 91 | Performed by: STUDENT IN AN ORGANIZED HEALTH CARE EDUCATION/TRAINING PROGRAM

## 2022-11-03 PROCEDURE — 20000000 HC ICU ROOM

## 2022-11-03 PROCEDURE — C1751 CATH, INF, PER/CENT/MIDLINE: HCPCS

## 2022-11-03 PROCEDURE — 99232 SBSQ HOSP IP/OBS MODERATE 35: CPT | Mod: ,,, | Performed by: INTERNAL MEDICINE

## 2022-11-03 PROCEDURE — 99233 PR SUBSEQUENT HOSPITAL CARE,LEVL III: ICD-10-PCS | Mod: 24,,, | Performed by: STUDENT IN AN ORGANIZED HEALTH CARE EDUCATION/TRAINING PROGRAM

## 2022-11-03 PROCEDURE — 82330 ASSAY OF CALCIUM: CPT | Performed by: TRANSPLANT SURGERY

## 2022-11-03 PROCEDURE — 27000221 HC OXYGEN, UP TO 24 HOURS

## 2022-11-03 PROCEDURE — 94761 N-INVAS EAR/PLS OXIMETRY MLT: CPT

## 2022-11-03 PROCEDURE — 99232 PR SUBSEQUENT HOSPITAL CARE,LEVL II: ICD-10-PCS | Mod: ,,, | Performed by: INTERNAL MEDICINE

## 2022-11-03 PROCEDURE — 80048 BASIC METABOLIC PNL TOTAL CA: CPT | Mod: 91 | Performed by: STUDENT IN AN ORGANIZED HEALTH CARE EDUCATION/TRAINING PROGRAM

## 2022-11-03 PROCEDURE — 81200002 HC PANCREAS ACQUISITION CHARGE

## 2022-11-03 PROCEDURE — 63600175 PHARM REV CODE 636 W HCPCS: Mod: NTX | Performed by: CLINICAL NURSE SPECIALIST

## 2022-11-03 PROCEDURE — 84100 ASSAY OF PHOSPHORUS: CPT | Performed by: TRANSPLANT SURGERY

## 2022-11-03 PROCEDURE — 99900035 HC TECH TIME PER 15 MIN (STAT)

## 2022-11-03 PROCEDURE — 97530 THERAPEUTIC ACTIVITIES: CPT

## 2022-11-03 DEVICE — STENT DOUBLE J 7FRX12CM
Type: IMPLANTABLE DEVICE | Site: URETER | Status: NON-FUNCTIONAL
Removed: 2022-11-30

## 2022-11-03 RX ORDER — FLUCONAZOLE 2 MG/ML
200 INJECTION, SOLUTION INTRAVENOUS
Status: DISCONTINUED | OUTPATIENT
Start: 2022-11-03 | End: 2022-11-05

## 2022-11-03 RX ORDER — DEXTROSE MONOHYDRATE AND SODIUM CHLORIDE 5; .45 G/100ML; G/100ML
INJECTION, SOLUTION INTRAVENOUS CONTINUOUS
Status: DISCONTINUED | OUTPATIENT
Start: 2022-11-03 | End: 2022-11-05

## 2022-11-03 RX ORDER — NALOXONE HCL 0.4 MG/ML
0.02 VIAL (ML) INJECTION
Status: DISCONTINUED | OUTPATIENT
Start: 2022-11-03 | End: 2022-11-09 | Stop reason: HOSPADM

## 2022-11-03 RX ORDER — ONDANSETRON 2 MG/ML
4 INJECTION INTRAMUSCULAR; INTRAVENOUS ONCE AS NEEDED
Status: COMPLETED | OUTPATIENT
Start: 2022-11-03 | End: 2022-11-03

## 2022-11-03 RX ORDER — VALGANCICLOVIR 450 MG/1
450 TABLET, FILM COATED ORAL DAILY
Status: DISCONTINUED | OUTPATIENT
Start: 2022-11-13 | End: 2022-11-09 | Stop reason: HOSPADM

## 2022-11-03 RX ORDER — MANNITOL 250 MG/ML
INJECTION, SOLUTION INTRAVENOUS
Status: DISCONTINUED | OUTPATIENT
Start: 2022-11-03 | End: 2022-11-03

## 2022-11-03 RX ORDER — NAPROXEN SODIUM 220 MG/1
81 TABLET, FILM COATED ORAL DAILY
Status: DISCONTINUED | OUTPATIENT
Start: 2022-11-03 | End: 2022-11-09 | Stop reason: HOSPADM

## 2022-11-03 RX ORDER — SODIUM CHLORIDE 9 MG/ML
INJECTION, SOLUTION INTRAVENOUS CONTINUOUS
Status: DISCONTINUED | OUTPATIENT
Start: 2022-11-03 | End: 2022-11-04

## 2022-11-03 RX ORDER — GLUCAGON 1 MG
1 KIT INJECTION
Status: DISCONTINUED | OUTPATIENT
Start: 2022-11-03 | End: 2022-11-04

## 2022-11-03 RX ORDER — LABETALOL HCL 20 MG/4 ML
10 SYRINGE (ML) INTRAVENOUS ONCE
Status: COMPLETED | OUTPATIENT
Start: 2022-11-03 | End: 2022-11-03

## 2022-11-03 RX ORDER — CALCIUM GLUCONATE 20 MG/ML
1 INJECTION, SOLUTION INTRAVENOUS ONCE
Status: COMPLETED | OUTPATIENT
Start: 2022-11-03 | End: 2022-11-03

## 2022-11-03 RX ORDER — DIPHENHYDRAMINE HYDROCHLORIDE 50 MG/ML
50 INJECTION INTRAMUSCULAR; INTRAVENOUS
Status: DISCONTINUED | OUTPATIENT
Start: 2022-11-04 | End: 2022-11-05

## 2022-11-03 RX ORDER — HEPARIN SODIUM 5000 [USP'U]/ML
5000 INJECTION, SOLUTION INTRAVENOUS; SUBCUTANEOUS 3 TIMES DAILY
Status: DISCONTINUED | OUTPATIENT
Start: 2022-11-03 | End: 2022-11-09 | Stop reason: HOSPADM

## 2022-11-03 RX ORDER — NYSTATIN 100000 [USP'U]/ML
5 SUSPENSION ORAL
Qty: 473 ML | Refills: 0 | Status: ON HOLD | OUTPATIENT
Start: 2022-11-03 | End: 2022-11-28 | Stop reason: HOSPADM

## 2022-11-03 RX ORDER — ALBUMIN HUMAN 250 G/1000ML
25 SOLUTION INTRAVENOUS EVERY 6 HOURS
Status: COMPLETED | OUTPATIENT
Start: 2022-11-03 | End: 2022-11-04

## 2022-11-03 RX ORDER — MYCOPHENOLATE MOFETIL 250 MG/1
1000 CAPSULE ORAL 2 TIMES DAILY
Qty: 240 CAPSULE | Refills: 11 | Status: ON HOLD | OUTPATIENT
Start: 2022-11-03 | End: 2022-11-28 | Stop reason: SDUPTHER

## 2022-11-03 RX ORDER — ACETAMINOPHEN 650 MG/20.3ML
650 LIQUID ORAL
Status: DISCONTINUED | OUTPATIENT
Start: 2022-11-04 | End: 2022-11-05

## 2022-11-03 RX ORDER — ERGOCALCIFEROL 1.25 MG/1
50000 CAPSULE ORAL
Status: DISCONTINUED | OUTPATIENT
Start: 2022-11-04 | End: 2022-11-09 | Stop reason: HOSPADM

## 2022-11-03 RX ORDER — NYSTATIN 100000 [USP'U]/ML
500000 SUSPENSION ORAL 4 TIMES DAILY
Status: DISCONTINUED | OUTPATIENT
Start: 2022-11-10 | End: 2022-11-09 | Stop reason: HOSPADM

## 2022-11-03 RX ORDER — LABETALOL HCL 20 MG/4 ML
SYRINGE (ML) INTRAVENOUS
Status: COMPLETED
Start: 2022-11-03 | End: 2022-11-03

## 2022-11-03 RX ORDER — SULFAMETHOXAZOLE AND TRIMETHOPRIM 400; 80 MG/1; MG/1
1 TABLET ORAL DAILY
Qty: 30 TABLET | Refills: 5 | Status: SHIPPED | OUTPATIENT
Start: 2022-11-03 | End: 2023-01-17 | Stop reason: SINTOL

## 2022-11-03 RX ORDER — HYDROMORPHONE HYDROCHLORIDE 1 MG/ML
0.5 INJECTION, SOLUTION INTRAMUSCULAR; INTRAVENOUS; SUBCUTANEOUS ONCE
Status: COMPLETED | OUTPATIENT
Start: 2022-11-03 | End: 2022-11-03

## 2022-11-03 RX ORDER — SULFAMETHOXAZOLE AND TRIMETHOPRIM 400; 80 MG/1; MG/1
1 TABLET ORAL EVERY MORNING
Status: DISCONTINUED | OUTPATIENT
Start: 2022-11-13 | End: 2022-11-09 | Stop reason: HOSPADM

## 2022-11-03 RX ORDER — HYDROMORPHONE HYDROCHLORIDE 2 MG/ML
INJECTION, SOLUTION INTRAMUSCULAR; INTRAVENOUS; SUBCUTANEOUS
Status: DISCONTINUED | OUTPATIENT
Start: 2022-11-03 | End: 2022-11-03

## 2022-11-03 RX ORDER — METHYLPREDNISOLONE SOD SUCC 125 MG
250 VIAL (EA) INJECTION ONCE
Status: COMPLETED | OUTPATIENT
Start: 2022-11-04 | End: 2022-11-04

## 2022-11-03 RX ORDER — VENLAFAXINE HYDROCHLORIDE 37.5 MG/1
37.5 CAPSULE, EXTENDED RELEASE ORAL DAILY
Status: DISCONTINUED | OUTPATIENT
Start: 2022-11-03 | End: 2022-11-09 | Stop reason: HOSPADM

## 2022-11-03 RX ORDER — MORPHINE SULFATE 1 MG/ML
INJECTION INTRAVENOUS CONTINUOUS
Status: DISCONTINUED | OUTPATIENT
Start: 2022-11-03 | End: 2022-11-03

## 2022-11-03 RX ORDER — INSULIN ASPART 100 [IU]/ML
0-5 INJECTION, SOLUTION INTRAVENOUS; SUBCUTANEOUS EVERY 6 HOURS PRN
Status: DISCONTINUED | OUTPATIENT
Start: 2022-11-03 | End: 2022-11-04

## 2022-11-03 RX ORDER — PREDNISONE 5 MG/1
TABLET ORAL
Qty: 70 TABLET | Refills: 11 | Status: ON HOLD | OUTPATIENT
Start: 2022-11-03 | End: 2022-12-15 | Stop reason: HOSPADM

## 2022-11-03 RX ORDER — NICARDIPINE HYDROCHLORIDE 0.2 MG/ML
0-15 INJECTION INTRAVENOUS CONTINUOUS
Status: DISCONTINUED | OUTPATIENT
Start: 2022-11-03 | End: 2022-11-04

## 2022-11-03 RX ORDER — VALGANCICLOVIR 450 MG/1
900 TABLET, FILM COATED ORAL DAILY
Qty: 60 TABLET | Refills: 2 | Status: ON HOLD | OUTPATIENT
Start: 2022-11-03 | End: 2022-11-28 | Stop reason: SDUPTHER

## 2022-11-03 RX ORDER — DIPHENHYDRAMINE HYDROCHLORIDE 50 MG/ML
12.5 INJECTION INTRAMUSCULAR; INTRAVENOUS EVERY 4 HOURS PRN
Status: DISCONTINUED | OUTPATIENT
Start: 2022-11-03 | End: 2022-11-05

## 2022-11-03 RX ORDER — SODIUM CHLORIDE 9 MG/ML
INJECTION, SOLUTION INTRAVENOUS
Status: DISCONTINUED | OUTPATIENT
Start: 2022-11-03 | End: 2022-11-09 | Stop reason: HOSPADM

## 2022-11-03 RX ORDER — INDOMETHACIN 25 MG/1
CAPSULE ORAL
Status: DISCONTINUED | OUTPATIENT
Start: 2022-11-03 | End: 2022-11-03

## 2022-11-03 RX ORDER — OXYCODONE HYDROCHLORIDE 5 MG/1
5 TABLET ORAL EVERY 4 HOURS PRN
Status: DISCONTINUED | OUTPATIENT
Start: 2022-11-03 | End: 2022-11-09 | Stop reason: HOSPADM

## 2022-11-03 RX ORDER — CEFAZOLIN SODIUM 1 G/3ML
INJECTION, POWDER, FOR SOLUTION INTRAMUSCULAR; INTRAVENOUS
Status: DISCONTINUED | OUTPATIENT
Start: 2022-11-03 | End: 2022-11-03 | Stop reason: HOSPADM

## 2022-11-03 RX ORDER — LABETALOL HCL 20 MG/4 ML
10 SYRINGE (ML) INTRAVENOUS EVERY 4 HOURS PRN
Status: DISCONTINUED | OUTPATIENT
Start: 2022-11-03 | End: 2022-11-09 | Stop reason: HOSPADM

## 2022-11-03 RX ORDER — METHYLPREDNISOLONE SOD SUCC 125 MG
125 VIAL (EA) INJECTION ONCE
Status: COMPLETED | OUTPATIENT
Start: 2022-11-05 | End: 2022-11-05

## 2022-11-03 RX ORDER — TACROLIMUS 1 MG/1
6 CAPSULE ORAL EVERY 12 HOURS
Qty: 360 CAPSULE | Refills: 11 | Status: SHIPPED | OUTPATIENT
Start: 2022-11-03 | End: 2022-11-14 | Stop reason: DRUGHIGH

## 2022-11-03 RX ORDER — FAMOTIDINE 10 MG/ML
20 INJECTION INTRAVENOUS EVERY 24 HOURS
Status: DISCONTINUED | OUTPATIENT
Start: 2022-11-03 | End: 2022-11-05

## 2022-11-03 RX ORDER — OXYCODONE HYDROCHLORIDE 10 MG/1
10 TABLET ORAL EVERY 4 HOURS PRN
Status: DISCONTINUED | OUTPATIENT
Start: 2022-11-03 | End: 2022-11-09 | Stop reason: HOSPADM

## 2022-11-03 RX ORDER — MYCOPHENOLATE MOFETIL 200 MG/ML
1000 POWDER, FOR SUSPENSION ORAL 2 TIMES DAILY
Status: DISCONTINUED | OUTPATIENT
Start: 2022-11-03 | End: 2022-11-05

## 2022-11-03 RX ORDER — NICARDIPINE HYDROCHLORIDE 0.2 MG/ML
0-15 INJECTION INTRAVENOUS CONTINUOUS
Status: DISCONTINUED | OUTPATIENT
Start: 2022-11-03 | End: 2022-11-03

## 2022-11-03 RX ORDER — FUROSEMIDE 10 MG/ML
INJECTION INTRAMUSCULAR; INTRAVENOUS
Status: DISCONTINUED | OUTPATIENT
Start: 2022-11-03 | End: 2022-11-03

## 2022-11-03 RX ORDER — MORPHINE SULFATE 4 MG/ML
4 INJECTION, SOLUTION INTRAMUSCULAR; INTRAVENOUS
Status: DISCONTINUED | OUTPATIENT
Start: 2022-11-03 | End: 2022-11-04

## 2022-11-03 RX ORDER — MUPIROCIN 20 MG/G
1 OINTMENT TOPICAL 2 TIMES DAILY
Status: COMPLETED | OUTPATIENT
Start: 2022-11-03 | End: 2022-11-07

## 2022-11-03 RX ADMIN — HEPARIN SODIUM 5000 UNITS: 5000 INJECTION INTRAVENOUS; SUBCUTANEOUS at 09:11

## 2022-11-03 RX ADMIN — CARVEDILOL 25 MG: 25 TABLET, FILM COATED ORAL at 04:11

## 2022-11-03 RX ADMIN — TACROLIMUS 1 MG: 1 CAPSULE, GELATIN COATED ORAL at 05:11

## 2022-11-03 RX ADMIN — CISATRACURIUM BESYLATE 8 MG: 10 INJECTION, SOLUTION INTRAVENOUS at 02:11

## 2022-11-03 RX ADMIN — PHENYLEPHRINE HYDROCHLORIDE 100 MCG: 10 INJECTION INTRAVENOUS at 02:11

## 2022-11-03 RX ADMIN — SUGAMMADEX 200 MG: 100 INJECTION, SOLUTION INTRAVENOUS at 04:11

## 2022-11-03 RX ADMIN — SODIUM BICARBONATE 50 MEQ: 84 INJECTION, SOLUTION INTRAVENOUS at 02:11

## 2022-11-03 RX ADMIN — Medication 10 MG: at 12:11

## 2022-11-03 RX ADMIN — MORPHINE SULFATE 4 MG: 4 INJECTION INTRAVENOUS at 02:11

## 2022-11-03 RX ADMIN — Medication 20 MG: at 04:11

## 2022-11-03 RX ADMIN — HEPARIN SODIUM 5000 UNITS: 5000 INJECTION INTRAVENOUS; SUBCUTANEOUS at 04:11

## 2022-11-03 RX ADMIN — FLUCONAZOLE IN SODIUM CHLORIDE 200 MG: 2 INJECTION, SOLUTION INTRAVENOUS at 06:11

## 2022-11-03 RX ADMIN — FUROSEMIDE 100 MG: 10 INJECTION, SOLUTION INTRAMUSCULAR; INTRAVENOUS at 02:11

## 2022-11-03 RX ADMIN — NIFEDIPINE 60 MG: 30 TABLET, FILM COATED, EXTENDED RELEASE ORAL at 09:11

## 2022-11-03 RX ADMIN — MUPIROCIN 1 G: 20 OINTMENT TOPICAL at 10:11

## 2022-11-03 RX ADMIN — VENLAFAXINE HYDROCHLORIDE 37.5 MG: 37.5 CAPSULE, EXTENDED RELEASE ORAL at 11:11

## 2022-11-03 RX ADMIN — MYCOPHENOLATE MOFETIL 1000 MG: 200 POWDER, FOR SUSPENSION ORAL at 09:11

## 2022-11-03 RX ADMIN — LABETALOL HYDROCHLORIDE 10 MG: 5 INJECTION, SOLUTION INTRAVENOUS at 05:11

## 2022-11-03 RX ADMIN — AMPICILLIN SODIUM AND SULBACTAM SODIUM 3 G: 2; 1 INJECTION, POWDER, FOR SOLUTION INTRAMUSCULAR; INTRAVENOUS at 01:11

## 2022-11-03 RX ADMIN — AMPICILLIN SODIUM AND SULBACTAM SODIUM 3 G: 2; 1 INJECTION, POWDER, FOR SOLUTION INTRAMUSCULAR; INTRAVENOUS at 11:11

## 2022-11-03 RX ADMIN — INSULIN HUMAN 10 UNITS: 100 INJECTION, SOLUTION PARENTERAL at 02:11

## 2022-11-03 RX ADMIN — SODIUM CHLORIDE 0.2 MCG/KG/MIN: 9 INJECTION, SOLUTION INTRAVENOUS at 02:11

## 2022-11-03 RX ADMIN — HYDRALAZINE HYDROCHLORIDE 25 MG: 25 TABLET, FILM COATED ORAL at 07:11

## 2022-11-03 RX ADMIN — HYDROMORPHONE HYDROCHLORIDE 0.4 MG: 2 INJECTION INTRAMUSCULAR; INTRAVENOUS; SUBCUTANEOUS at 04:11

## 2022-11-03 RX ADMIN — NICARDIPINE HYDROCHLORIDE 1 MG/HR: 0.2 INJECTION INTRAVENOUS at 07:11

## 2022-11-03 RX ADMIN — HYDRALAZINE HYDROCHLORIDE 10 MG: 20 INJECTION, SOLUTION INTRAMUSCULAR; INTRAVENOUS at 05:11

## 2022-11-03 RX ADMIN — HYDROMORPHONE HYDROCHLORIDE 0.5 MG: 1 INJECTION, SOLUTION INTRAMUSCULAR; INTRAVENOUS; SUBCUTANEOUS at 07:11

## 2022-11-03 RX ADMIN — Medication 10 MG: at 05:11

## 2022-11-03 RX ADMIN — PHENYLEPHRINE HYDROCHLORIDE 200 MCG: 10 INJECTION INTRAVENOUS at 02:11

## 2022-11-03 RX ADMIN — MANNITOL 25 G: 12.5 INJECTION, SOLUTION INTRAVENOUS at 02:11

## 2022-11-03 RX ADMIN — AMPICILLIN SODIUM AND SULBACTAM SODIUM 3 G: 2; 1 INJECTION, POWDER, FOR SOLUTION INTRAMUSCULAR; INTRAVENOUS at 06:11

## 2022-11-03 RX ADMIN — ALBUMIN (HUMAN) 25 G: 12.5 SOLUTION INTRAVENOUS at 05:11

## 2022-11-03 RX ADMIN — HYDRALAZINE HYDROCHLORIDE 25 MG: 25 TABLET, FILM COATED ORAL at 02:11

## 2022-11-03 RX ADMIN — HYDRALAZINE HYDROCHLORIDE 25 MG: 25 TABLET, FILM COATED ORAL at 09:11

## 2022-11-03 RX ADMIN — OXYCODONE HYDROCHLORIDE 10 MG: 10 TABLET ORAL at 11:11

## 2022-11-03 RX ADMIN — FENTANYL CITRATE 50 MCG: 50 INJECTION, SOLUTION INTRAMUSCULAR; INTRAVENOUS at 01:11

## 2022-11-03 RX ADMIN — LABETALOL HYDROCHLORIDE 10 MG: 5 INJECTION, SOLUTION INTRAVENOUS at 10:11

## 2022-11-03 RX ADMIN — ALBUMIN (HUMAN) 25 G: 12.5 SOLUTION INTRAVENOUS at 11:11

## 2022-11-03 RX ADMIN — MORPHINE SULFATE: 10 INJECTION, SOLUTION INTRAMUSCULAR; INTRAVENOUS at 08:11

## 2022-11-03 RX ADMIN — OXYCODONE HYDROCHLORIDE 10 MG: 10 TABLET ORAL at 04:11

## 2022-11-03 RX ADMIN — DIPHENHYDRAMINE HYDROCHLORIDE 12.5 MG: 50 INJECTION, SOLUTION INTRAMUSCULAR; INTRAVENOUS at 06:11

## 2022-11-03 RX ADMIN — HYDROMORPHONE HYDROCHLORIDE 0.6 MG: 2 INJECTION INTRAMUSCULAR; INTRAVENOUS; SUBCUTANEOUS at 04:11

## 2022-11-03 RX ADMIN — LABETALOL HYDROCHLORIDE 10 MG: 5 INJECTION, SOLUTION INTRAVENOUS at 04:11

## 2022-11-03 RX ADMIN — SODIUM CHLORIDE: 0.9 INJECTION, SOLUTION INTRAVENOUS at 05:11

## 2022-11-03 RX ADMIN — FAMOTIDINE 20 MG: 10 INJECTION, SOLUTION INTRAVENOUS at 09:11

## 2022-11-03 RX ADMIN — HYDRALAZINE HYDROCHLORIDE 10 MG: 20 INJECTION, SOLUTION INTRAMUSCULAR; INTRAVENOUS at 06:11

## 2022-11-03 RX ADMIN — CISATRACURIUM BESYLATE 6 MG: 10 INJECTION, SOLUTION INTRAVENOUS at 01:11

## 2022-11-03 RX ADMIN — CISATRACURIUM BESYLATE 6 MG: 10 INJECTION, SOLUTION INTRAVENOUS at 02:11

## 2022-11-03 RX ADMIN — TACROLIMUS 1 MG: 1 CAPSULE, GELATIN COATED ORAL at 07:11

## 2022-11-03 RX ADMIN — SODIUM CHLORIDE 5 ML/HR: 0.9 INJECTION, SOLUTION INTRAVENOUS at 06:11

## 2022-11-03 RX ADMIN — AMPICILLIN SODIUM AND SULBACTAM SODIUM 3 G: 2; 1 INJECTION, POWDER, FOR SOLUTION INTRAMUSCULAR; INTRAVENOUS at 04:11

## 2022-11-03 RX ADMIN — CARVEDILOL 25 MG: 25 TABLET, FILM COATED ORAL at 07:11

## 2022-11-03 RX ADMIN — ONDANSETRON 4 MG: 2 INJECTION INTRAMUSCULAR; INTRAVENOUS at 07:11

## 2022-11-03 RX ADMIN — ANTI-THYMOCYTE GLOBULIN (RABBIT) 100 MG: 5 INJECTION, POWDER, LYOPHILIZED, FOR SOLUTION INTRAVENOUS at 01:11

## 2022-11-03 RX ADMIN — ASPIRIN 81 MG CHEWABLE TABLET 81 MG: 81 TABLET CHEWABLE at 07:11

## 2022-11-03 RX ADMIN — LABETALOL HYDROCHLORIDE 10 MG: 5 INJECTION, SOLUTION INTRAVENOUS at 06:11

## 2022-11-03 RX ADMIN — MUPIROCIN 1 G: 20 OINTMENT TOPICAL at 09:11

## 2022-11-03 RX ADMIN — INSULIN HUMAN 15 UNITS: 100 INJECTION, SOLUTION PARENTERAL at 02:11

## 2022-11-03 RX ADMIN — PHENYLEPHRINE HYDROCHLORIDE 100 MCG: 10 INJECTION INTRAVENOUS at 01:11

## 2022-11-03 RX ADMIN — MUPIROCIN: 20 OINTMENT TOPICAL at 09:11

## 2022-11-03 RX ADMIN — CALCIUM GLUCONATE 1 G: 20 INJECTION, SOLUTION INTRAVENOUS at 09:11

## 2022-11-03 RX ADMIN — DEXTROSE AND SODIUM CHLORIDE: 5; .45 INJECTION, SOLUTION INTRAVENOUS at 05:11

## 2022-11-03 NOTE — CONSULTS
"  [facility] Intensive [facility]  Adult Nutrition  Consult Note    SUMMARY     Recommendations    If/when able, ADAT to Diabetic/Renal. Add Novasource ONS if PO intake < 50% consistently.   - Liberalize to Regular diet if/when possible.   If unable to advance diet x 72 hours, please re-consult for EN recommendations.  RD to monitor & follow-up.    Goals: Meet % EEN, EPN by RD f/u date  Nutrition Goal Status: new  Communication of RD Recs: reviewed with RN    Assessment and Plan    Nutrition Problem:  1) Inadequate energy intake  2) Increased nutrient needs    Related to (etiology):   1) Inability to consume sufficient energy  2) Physiological causes     Signs and Symptoms (as evidenced by):   1) NPO  2) S/p kidney/pancreas tx     Interventions(treatment strategy):  Collaboration of nutrition care w/ other providers    Nutrition Diagnosis Status:   New    Reason for Assessment    Reason For Assessment: consult  Diagnosis: other (see comments) (DM1 w/ ESRD)  Relevant Medical History: ESRD on HD, DM1  Interdisciplinary Rounds: did not attend    General Information Comments: NPO; s/p kidney & pancreas transplant. Pt seen by RD yesterday for diabetic diet education. Pt reported good appetite PTA & UBW of 155#; chart review confirms. Pt appears nourished w/ no indicators of malnutrition.   Nutrition Discharge Planning: TSU RD to educate pt on post-transplant diet.     Nutrition/Diet History    Factors Affecting Nutritional Intake: NPO    Anthropometrics    Temp: 98.4 °F (36.9 °C)  Height: 5' 4" (162.6 cm)  Height (inches): 64 in  Weight Method: Bed Scale  Weight: 69.6 kg (153 lb 7 oz)  Weight (lb): 153.44 lb  Ideal Body Weight (IBW), Female: 120 lb  % Ideal Body Weight, Female (lb): 127.87 %  BMI (Calculated): 26.3  BMI Grade: 25 - 29.9 - overweight    Lab/Procedures/Meds    Pertinent Labs Reviewed: reviewed  Pertinent Labs Comments: Na 134, BUN 36, Creat 5.1, GFR 11.2, A1C 11.1  Pertinent Medications Reviewed: " reviewed  Pertinent Medications Comments: D5, Insulin, Nicardipine    Estimated/Assessed Needs    Weight Used For Calorie Calculations: 69.6 kg (153 lb 7 oz)    Energy Calorie Requirements (kcal): 1770 kcal/d  Energy Need Method: Gail-St Jeor (1.25 PAL)    Protein Requirements: 84 g/d (1.2 g/kg)  Weight Used For Protein Calculations: 69.6 kg (153 lb 7 oz)    Estimated Fluid Requirement Method: other (see comments) (Per MD or 1 mL/kcal)  RDA Method (mL): 1770    CHO Requirement: 221g    Nutrition Prescription Ordered    Current Diet Order: NPO    Evaluation of Received Nutrient/Fluid Intake    I/O: +9.3L since admit    Comments: LBM: 11/1    Nutrition Risk    Level of Risk/Frequency of Follow-up:  (2x/week)     Monitor and Evaluation    Food and Nutrient Intake: food and beverage intake, enteral nutrition intake  Food and Nutrient Adminstration: diet order, enteral and parenteral nutrition administration  Physical Activity and Function: nutrition-related ADLs and IADLs  Anthropometric Measurements: weight, weight change  Biochemical Data, Medical Tests and Procedures: inflammatory profile, lipid profile, glucose/endocrine profile, gastrointestinal profile, electrolyte and renal panel  Nutrition-Focused Physical Findings: overall appearance     Nutrition Follow-Up    RD Follow-up?: Yes

## 2022-11-03 NOTE — ANESTHESIA POSTPROCEDURE EVALUATION
Anesthesia Post Evaluation    Patient: Isabela Read    Procedure(s) Performed: Procedure(s) (LRB):  TRANSPLANT, KIDNEY (Right)  TRANSPLANT, PANCREAS (N/A)    Final Anesthesia Type: general      Patient location during evaluation: ICU  Patient participation: No - Unable to Participate, Sedation  Level of consciousness: sedated  Post-procedure vital signs: reviewed and stable  Pain management: adequate  Airway patency: patent    PONV status at discharge: No PONV  Anesthetic complications: no      Cardiovascular status: stable  Respiratory status: unassisted, spontaneous ventilation and face mask  Hydration status: euvolemic  Follow-up not needed.          Vitals Value Taken Time   /81 11/03/22 1811   Temp 36.9 °C (98.4 °F) 11/03/22 1515   Pulse 105 11/03/22 1817   Resp 19 11/03/22 1817   SpO2 96 % 11/03/22 1817   Vitals shown include unvalidated device data.      No case tracking events are documented in the log.      Pain/Renny Score: Pain Rating Prior to Med Admin: 7 (11/3/2022  4:47 PM)  Pain Rating Post Med Admin: 5 (11/3/2022  5:25 PM)  Renny Score: 9 (11/3/2022  5:30 AM)

## 2022-11-03 NOTE — SUBJECTIVE & OBJECTIVE
"Interval HPI:   Overnight events:S/p kidney/pancreas transplant Patient in room 42406/12399 A. Blood glucose improving. BG at goal on current insulin regimen (SSI ). Steroid use- Methylprednisolone  and Dexamethasone  .   1 Day Post-Op  Renal function- Abnormal - Cr 4.3   Vasopressors-  None     Diet NPO Except for: Sips with Medication     Eating:   NPO  Nausea: No  Hypoglycemia and intervention: No  Fever: No  TPN and/or TF: No      /73 (BP Location: Right arm, Patient Position: Sitting)   Pulse 98   Temp 98.4 °F (36.9 °C) (Oral)   Resp 17   Ht 5' 4" (1.626 m)   Wt 69.6 kg (153 lb 7 oz)   SpO2 98%   Breastfeeding No   BMI 26.34 kg/m²     Labs Reviewed and Include    Recent Labs   Lab 11/03/22  1100   *   CALCIUM 8.8      K 3.7   CO2 21*      BUN 36*   CREATININE 4.3*     Lab Results   Component Value Date    WBC 15.41 (H) 11/03/2022    HGB 9.3 (L) 11/03/2022    HCT 26.9 (L) 11/03/2022    MCV 96 11/03/2022     11/03/2022     No results for input(s): TSH, FREET4 in the last 168 hours.  Lab Results   Component Value Date    HGBA1C 11.1 (H) 11/02/2022       Nutritional status:   Body mass index is 26.34 kg/m².  Lab Results   Component Value Date    ALBUMIN 4.0 11/02/2022    ALBUMIN 3.4 (L) 09/14/2022    ALBUMIN 3.6 08/01/2022     No results found for: PREALBUMIN    Estimated Creatinine Clearance: 18.8 mL/min (A) (based on SCr of 4.3 mg/dL (H)).    Accu-Checks  Recent Labs     11/03/22  0610 11/03/22  0713 11/03/22  0756 11/03/22  0910 11/03/22  1011 11/03/22  1043 11/03/22  1211 11/03/22  1306 11/03/22  1408 11/03/22  1440   POCTGLUCOSE 113* 80 81 76 94 115* 119* 118* 117* 128*       Current Medications and/or Treatments Impacting Glycemic Control  Immunotherapy:    Immunosuppressants           Stop Route Frequency     antithymocyte globulin (rabbit) 100 mg, hydrocortisone sodium succinate (SOLU-CORTEF) 20 mg in sodium chloride 0.9% 500 mL (FOR PERIPHERAL LINE ADMINISTRATION " ONLY)         11/06 0859 IV Daily     mycophenolate mofetil 200 mg/mL suspension 1,000 mg         -- PER NG TUBE 2 times daily     tacrolimus (PROGRAF) 1 mg/mL oral syringe         -- PER NG TUBE 2 times daily          Steroids:   Hormones (From admission, onward)      Start     Stop Route Frequency Ordered    11/06/22 0900  methylPREDNISolone sodium succinate injection 20 mg         -- IV Daily 11/03/22 0436    11/05/22 0900  methylPREDNISolone sodium succinate injection 125 mg         -- IV Once 11/03/22 0436    11/04/22 0900  methylPREDNISolone sodium succinate injection 250 mg         -- IV Once 11/03/22 0436 11/04/22 0900  antithymocyte globulin (rabbit) 100 mg, hydrocortisone sodium succinate (SOLU-CORTEF) 20 mg in sodium chloride 0.9% 500 mL (FOR PERIPHERAL LINE ADMINISTRATION ONLY)         11/06 0859 IV Daily 11/03/22 0436          Pressors:    Autonomic Drugs (From admission, onward)      None          Hyperglycemia/Diabetes Medications:   Antihyperglycemics (From admission, onward)      Start     Stop Route Frequency Ordered    11/03/22 1306  insulin aspart U-100 pen 0-5 Units         -- SubQ Every 6 hours PRN 11/03/22 1207

## 2022-11-03 NOTE — PLAN OF CARE
Recommendations     If/when able, ADAT to Diabetic/Renal. Add Novasource ONS if PO intake < 50% consistently.   - Liberalize to Regular diet if/when possible.   If unable to advance diet x 72 hours, please re-consult for EN recommendations.  RD to monitor & follow-up.     Goals: Meet % EEN, EPN by RD f/u date  Nutrition Goal Status: new  Communication of RD Recs: reviewed with RN

## 2022-11-03 NOTE — OP NOTE
Operative Report    Date of Procedure: 11/2/2022    Surgeons: Surgeon(s) and Role:  Panel 1:     * Kumar Rodriges Jr., MD - Primary     * Amelia Marte MD - Fellow     * Mg Morse MD - Co-Surgeon      First Assistant Attestation:  The presence of an additional attending surgeon functioning as first assistant was required due to the complexity of the procedure relative to any available residents. I certify that no resident was available who was qualified to serve as first assistant. Duties performed by the assistant included assisting the primary surgeon.    Pre-operative Diagnosis:  1. End stage renal disease secondary to Diabetes Mellitus - Type I  2. Diabetes Mellitus - Type I (Pancreas)    Post-operative Diagnosis: Same    Procedure(s) Performed:  DBD-Donor Kidney Transplant  DBD-Donor Pancreas Transplant with Loop Duodeno-Enterostomy    Anesthesia: General endotracheal  Estimated Blood Loss: 20 mL  Fluids Administered:      Findings: normal intraabdominal anatomy  Drains: 19f Chris drains x 1    Preamble  Indications and Patient Counseling: The patient is a 27 y.o. year old female with advanced renal failure and diabetes.  She has been evaluated for combined kidney-pancreas transplant, including expected outcome, risks, potential complications, and the advantages and disadvantages of alternatives of managing her renal disease.  Any donor-specific risk factors were also reviewed with the patient.  All questions were answered.  She voices understanding and agrees to proceed with the transplant.     Donor Risk Factors: Prior to the operation, the patient was advised of any donor-specific risk factors requiring specific disclosure.  Factors in this case included nothing that required specific disclosure.    Specific PHS Increased Risk Behavior criteria for the organ donor include:  None    All questions were answered, the patient voiced appropriate understanding, and she agreed to proceed with the  planned procedure.    ABO Confirmation: Immediately following arrival of the donor organ and prior to implantation, a formal ABO confirmation was done according to hospital and UNOS policies.  I confirmed the UNOS ID number ([id number]) of the donor organ and the donor and recipient ABO types, directly verifying these data by comparison with the UNOS Match Run report ([id number]).  This confirmation was personally done by an attending surgeon and circulating nurse, and is officially documented elsewhere.    Time-Out: A complete time out was carried out prior to incision, with confirmation of patient identity, correct procedure, correct operative site, appropriate antibiotic prophylaxis, review of any known allergies, and presence of all needed equipment.    Note: the back bench preparation of the donor organs is described in a separate report.      Procedure in Detail  The patient was brought into the operating room and placed in a supine position on the OR table. After the induction of GET anesthesia, lines were placed by the anesthesiologist. The urinary bladder was catheterized and irrigated with antibiotic solution. There was no tension on the axillae and all pressure points were padded. Sequential compression boots were used as were Fred Huggers. The abdomen was sterilely prepped and draped. The abdomen was entered via a midline laparotomy incision and the Bookwalter retractor was placed to provide exposure. Dissection was carried out as needed to expose the vessels for later anastomosis, including the common iliac and the smv for the pancreas, and the right external iliac artery and the  right external iliac vein for the kidney. Overlying lymphatics were ligated or cauterized and the vessels were dissected free for a length compatible with later anastomosis.      A segment of donor iliac artery was then brought to the operative field for arterial inflow for the pancreas allograft.  Anastomosis was done to the  recipient right common iliac artery in an end-to-side fashion with continuous 5-0 polypropylene after appropriate control with a vascular clamp.  At completion of the anastomosis, a vascular clamp was applied to the arterial graft just above the suture line, and flow was restored through the native artery.  This graft was then tunneled through the root of the small bowel mesentery to emerge just lateral to the superior mesenteric vein.      The pancreas was brought to the OR table on 11/3/2022  1:13 AM.  Venous control was obtained with a vascular clamp. A venotomy was made, the vein irrigated, and a portal vein to smv anastomosis was created with 5-0 Prolene. The arterial anastomosis was done between the allograft Y-graft and the inflow arterial iliac graft using 5-0 polypropylene.  The pancreas was reperfused at 11/3/2022  1:32 AM. Anastomosis time was 11/3/2022  1:32 AM and cold ischemia time was 11/3/2022  1:32 AM.  Hemostasis obtained using silk ties, polypropylene sutures, electrocautery, and temporary packing as appropriate. Once hemostasis was obtained, exocrine drainage of the pancreas was achieved by creating a loop duodeno-enterostomy in a standard fashion.  With the pancreas well perfused and sitting appropriately without tension on the anastomoses, viscera were replaced in their usual position.     The kidney was brought to the OR table at 11/3/2022  2:29 AM.  Venous control was obtained with a vascular clamp.  A venotomy was made, the vein irrigated, and an vena caval patch to right external iliac vein anastomosis was created with 5-0 polypropylene.  Arterial control was obtained with a vascular clamp.  Arteriotomy was made, the artery irrigated, and an aortic patch to right external iliac artery anastomosis was created with 6-0 polypropylene.  The kidney was unclamped and reperfused at 11/3/2022  2:58 AM.  Reperfusion quality was good. Intraoperative urine production was observed.  After hemostasis  was obtained, a Lich uretero-neocystostomy was created.  The bladder was filled and identified, opened, and the anastomosis created using 6-0 PDS.  The bladder muscle was closed over the distal ureter to create an antireflux tunnel.  A ureteral stent was used.     The wound was closed after a final check for hemostasis. At the end of the case the needle, sponge and instrument counts were all correct. Sterile dressings were applied, and the patient was brought to the recovery room/ICU in stable condition.         Organ Transplanted: 1. Right Kidney        2. Whole Pancreas With Duodenum     Kidney Implantation Site:  Artery:    right external iliac artery  Vein:    right external iliac vein  Moncada:    not to be removed before 2 days.  Ureteral Stent:  Yes    Pancreas Implantation Site:  Venous Drainage: smv  Exocrine Drainage: loop duodeno-enterostomy    Ischemic Times:   Kidney anastomosis (warm ischemia) time:  29 minutes   Kidney cold ischemia time:     386 minutes.  Pancreas anastomosis (warm ischemia) time:  19 minutes   Pancreas cold ischemia time:    310 minutes.    Donor Data:  UNOS ID:  [id number]  UNOS Match Run:  [id number]  Donor Type:  Donation after Brain Death  Donor CMV Status:  Negative  Donor HBcAB:  Negative  Donor HCV Status:  Negative

## 2022-11-03 NOTE — SUBJECTIVE & OBJECTIVE
"  Subjective:   History of Present Illness:  Ms. Read is a 27 y.o. year old  female with ESRD secondary to diabetic nephropathy and HTN.  She has been on the wait list for a kidney transplant at [facility] since 2021. Patient is currently on hemodialysis started on 21. Patient is dialyzing on TTS schedule, last HD 22. Patient reports that she is tolerating dialysis well. She has a LUE AV fistula. She is dialyzing 4 hours per session. Dry weight 61 kg. She reports urinating usually once per day ("normal" amount per urination)    She now presents as a direct admit as primary candidate for a  donor kidney/pancreas transplant. OR times have not been established at this time. Pt is currently not NPO. Dr. Rodriges will be the primary surgeon. Thymo induction. Pre-op labs and imaging have been ordered and will be reviewed prior to transplant. Endocrine consulted to assist with blood sugar management.    Hospital Course:  Underwent combined kidney and pancreas transplant 2022 which was uneventful.  Send to ICU for recovery, extubated.  Immediately making urine.  Insulin discontinued within several hours of arrival to ICU.    Interval History:  Sitting up in chair at the time of my visit, awake, sleepy.  She denies complaints other than some incisional pain, which she states is controlled.  She reports chest pain or shortness of breath.  Urine output has been 100-150 cc/hr earlier day.    Past Medical, Surgical, Family, and Social History:   Unchanged from H&P.    Scheduled Meds:   [START ON 2022] acetaminophen  650 mg Per NG tube Q24H    albumin human 25%  25 g Intravenous Q6H    ampicillin-sulbactim (UNASYN) IVPB  3 g Intravenous Q6H    [START ON 2022] antithymocyte globulin (rabbit), hydrocortisone 20mg, with optional heparin 1000 units in NS 500ml (FOR PERIPHERAL LINE ADMINISTRATION ONLY)  1.5 mg/kg (Adjusted) Intravenous Daily    aspirin  81 mg Oral Daily    carvediloL  " 25 mg Oral BID WM    [START ON 11/4/2022] diphenhydrAMINE  50 mg Intravenous Q24H    [START ON 11/4/2022] ergocalciferol  50,000 Units Oral Q7 Days    famotidine (PF)  20 mg Intravenous Daily    fluconazole (DIFLUCAN) IV (PEDS and ADULTS)  200 mg Intravenous Q24H    heparin (porcine)  5,000 Units Subcutaneous TID    hydrALAZINE  25 mg Oral Q8H    [START ON 11/5/2022] methylPREDNISolone sodium succinate injection  125 mg Intravenous Once    [START ON 11/6/2022] methylPREDNISolone sodium succinate injection  20 mg Intravenous Daily    [START ON 11/4/2022] methylPREDNISolone sodium succinate injection  250 mg Intravenous Once    mupirocin  1 g Nasal BID    mycophenolate mofetil  1,000 mg Per NG tube BID    NIFEdipine  60 mg Oral BID    [START ON 11/10/2022] nystatin  500,000 Units Mouth/Throat QID    [START ON 11/13/2022] sulfamethoxazole-trimethoprim 400-80mg  1 tablet Oral Daily AM    tacrolimus  1 mg Per NG tube BID    [START ON 11/13/2022] valGANciclovir  450 mg Oral Daily    venlafaxine  37.5 mg Oral Daily     Continuous Infusions:   sodium chloride 0.9% 40 mL/hr at 11/03/22 1700    dextrose 5 % and 0.45 % NaCl 50 mL/hr at 11/03/22 1700     PRN Meds:sodium chloride 0.9%, dextrose 10%, dextrose 10%, diphenhydrAMINE, glucagon (human recombinant), hydrALAZINE, insulin aspart U-100, labetalol, morphine, naloxone, ondansetron, oxyCODONE, oxyCODONE    Intake/Output - Last 3 Shifts         11/01 0700 11/02 0659 11/02 0700 11/03 0659 11/03 0700 11/04 0659    P.O.  600 530    I.V. (mL/kg)  360 (5.2) 1423.3 (20.4)    Other  300     IV Piggyback  2800 355    Total Intake(mL/kg)  4060 (58.3) 2308.3 (33.2)    Urine (mL/kg/hr)  550 (0.3) 1325 (1.8)    Emesis/NG output  0     Drains  75 230    Other  3192     Stool  0     Blood  0     Total Output  3817 1555    Net  +243 +753.3           Urine Occurrence  0 x     Stool Occurrence  0 x     Emesis Occurrence  0 x            Review of Systems   Constitutional: Negative.    HENT:  "Negative.     Respiratory:  Negative for shortness of breath.    Cardiovascular:  Negative for chest pain and leg swelling.   Gastrointestinal:  Positive for abdominal pain. Negative for nausea and vomiting.   Genitourinary:  Negative for difficulty urinating.   Skin:  Negative for rash.   Allergic/Immunologic: Positive for immunocompromised state.   Neurological: Negative.       Objective:     Vital Signs (Most Recent):  Temp: 98.4 °F (36.9 °C) (11/03/22 1515)  Pulse: 99 (11/03/22 1715)  Resp: 20 (11/03/22 1715)  BP: (!) 163/84 (11/03/22 1647)  SpO2: 95 % (11/03/22 1715)   Vital Signs (24h Range):  Temp:  [98.4 °F (36.9 °C)-99.5 °F (37.5 °C)] 98.4 °F (36.9 °C)  Pulse:  [] 99  Resp:  [8-31] 20  SpO2:  [92 %-100 %] 95 %  BP: (134-179)/() 163/84  Arterial Line BP: (111-181)/() 172/77     Weight: 69.6 kg (153 lb 7 oz)  Height: 5' 4" (162.6 cm)  Body mass index is 26.34 kg/m².    Physical Exam  Constitutional:       General: She is not in acute distress.  Cardiovascular:      Rate and Rhythm: Normal rate and regular rhythm.   Pulmonary:      Effort: Pulmonary effort is normal. No respiratory distress.      Breath sounds: Normal breath sounds.   Abdominal:      General: Bowel sounds are normal.      Palpations: Abdomen is soft. There is no mass.      Tenderness: There is abdominal tenderness.       Laboratory:  Recent Labs   Lab 11/02/22  0038 11/02/22  1419 11/03/22  0454 11/03/22  1100 11/03/22  1610   *  --  134* 141 142   K 4.8   < > 3.6 3.7 3.5   CL 93*  --  99 104 106   CO2 26  --  19* 21* 22*   BUN 40*  --  36* 36* 39*   CREATININE 6.5*  --  5.1* 4.3* 4.1*   CALCIUM 9.2  --  8.5* 8.8 8.7   PHOS 4.8*  --  2.9 3.5  --     < > = values in this interval not displayed.      Diagnostic Results:  Renal allograft ultrasound 11/03/2022:  There is a transplant kidney identified in the right lower quadrant.  Renal allograft measures 10.6 cm in craniocaudal length.  No perinephric fluid or collection " identified.  There is no renal mass or hydronephrosis.  Trace of peritransplant free fluid.  Ureteric stent is in place.    Doppler evaluation demonstrates a peak systolic velocity in the main renal artery of 323 cm/sec.  The main renal artery has a normal waveform without evidence of parvus tardus.  The main renal artery to iliac artery velocity ratio is not elevated measuring 0.95.  The main renal vein is patent.    Pancreas allograft ultrasound 11/03/2022:  Pancreatic allograft is homogeneous in echogenicity.  No peripancreatic fluid collections... Satisfactory grayscale and Doppler evaluation of the pancreas allograft.

## 2022-11-03 NOTE — PROGRESS NOTES
Pt admitted to [address] from OR with Anesthesia and MD at bedside, SBP 180s, MD aware of very limited IV access, IV Hydralazine to be given. All labs sent.

## 2022-11-03 NOTE — OP NOTE
Operative Report    Date of Procedure: 11/2/2022    Surgeon: Kumar Rodriges Jr, MD  First Assistant: Amelia Marte MD    Pre-operative Diagnosis: Allograft pancreas for transplantation  Post-operative Diagnosis: Same    Procedure(s) Performed: Back table preparation of allograft pancreas with vascular reconstruction: Y-Graft to SPA and SMA  Anesthesia: Not applicable  Estimated Blood Loss: Not applicable  Fluids Administered: Not applicable    Findings: normal pancreas allograft  Drains: Not applicable  Specimens: none    Preamble  Indications: This report describes only the backbench preparation of the pancreas prior to transplantation.  The transplant operation itself is described in a separate report.    ABO Confirmation: Immediately following arrival of the donor organ and prior to implantation, a formal ABO confirmation was done according to hospital and UNOS policies.  I confirmed the UNOS ID number of the donor organ and the donor and recipient ABO types, directly verifying these data by comparison with the UNOS Match Run report.  This confirmation was personally done by an attending surgeon and circulating nurse, and is officially documented elsewhere.    Time-Out: A complete time out was carried out prior to the procedure, with confirmation of patient identity, correct procedure, correct operative site, appropriate antibiotic prophylaxis, review of any known allergies, and presence of all needed equipment.    Procedure in Detail  Prior to starting the operation, the pancreas was prepared on the back table. This required Y-Graft to SPA and SMA reconstruction. The arterial anastomoses were completed with 6-0 prolene. The portal vein outflow was dissected for length. The spleen was removed by ligating and dividing the splenic vessels with silk ties. The pancreas was cleared of the surrounding fatty soft tissues. The duodenal ends were shortened and closed with a combination of vascular staplers and sutures.  The closure of the vessels in the root of the small bowel mesentery was reinforced as needed with ties on visible vessels and oversewing of the staple line with 4-0 polypropylene. All vascular anastomoses and closures were checked for hemostasis by flushing with preservation solution.

## 2022-11-03 NOTE — ANESTHESIA PROCEDURE NOTES
Intubation    Date/Time: 11/2/2022 9:24 PM  Performed by: Leigh Roach CRNA  Authorized by: Lynn Chaparro MD     Intubation:     Induction:  Intravenous    Intubated:  Postinduction    Mask Ventilation:  Not attempted    Attempts:  1    Attempted By:  CRNA    Method of Intubation:  Video laryngoscopy    Blade:  Watson 3    Laryngeal View Grade: Grade I - full view of cords      Difficult Airway Encountered?: No      Complications:  None    Airway Device:  Oral endotracheal tube    Airway Device Size:  7.0    Style/Cuff Inflation:  Cuffed (inflated to minimal occlusive pressure)    Tube secured:  22    Secured at:  The teeth    Placement Verified By:  Capnometry    Complicating Factors:  None    Findings Post-Intubation:  Atraumatic/condition of teeth unchanged and BS equal bilateral

## 2022-11-03 NOTE — OP NOTE
Operative Report    Date of Procedure: 11/2/2022    Surgeon: Mg Morse MD  First Assistant: Niurka Hernandez    Pre-operative Diagnosis: Allograft kidney for transplantation  Post-operative Diagnosis: Same    Procedure(s) Performed: Back Table Preparation of Kidney, Simple    Anesthesia: Not applicable  Estimated Blood Loss: Not applicable  Fluids Administered: Not applicable    Findings: as described below   Drains: not applicable    Preamble  Indications: This report describes only the backbench preparation of the kidney prior to transplantation.  The transplant operation itself is described in a separate report.    ABO Confirmation: Immediately following arrival of the donor organ and prior to implantation, a formal ABO confirmation was done according to hospital and UNOS policies.  I confirmed the UNOS ID number of the donor organ and the donor and recipient ABO types, directly verifying these data by comparison with the UNOS Match Run report.  This confirmation was personally done by an attending surgeon and circulating nurse, and is officially documented elsewhere.    Time-Out: A complete time out was carried out prior to the procedure, with confirmation of patient identity, correct procedure, correct operative site, appropriate antibiotic prophylaxis, review of any known allergies, and presence of all needed equipment.    Procedure in Detail  Prior to starting the operation, the right kidney  was prepared on the back table. Arterial anatomy was single. Venous anatomy was single. Ureteral anatomy was single. Back table vascular reconstruction was not required .  Unneeded fat was removed from the kidney, the vessels were cleaned of adherent tissue and tested for leaks, and the kidney was maintained at ice temperature in organ preservation solution until it was brought to the operative field.

## 2022-11-03 NOTE — OP NOTE
Certification of Assistant at Surgery       Surgery Date: 11/2/2022     Participating Surgeons:  Surgeon(s) and Role:  Panel 1:     * Kumar Rodriges Jr., MD - Primary     * Amelia Marte MD - Fellow     * Mg Morse MD - Co-Surgeon  Panel 2:     * Kumar Rodriges Jr., MD - Primary    Procedures:  Procedure(s) (LRB):  TRANSPLANT, KIDNEY (Right)  TRANSPLANT, PANCREAS (N/A)    Assistant Surgeon's Certification of Necessity:  I understand that section 1842 (b) (6) (d) of the Social Security Act generally prohibits Medicare Part B reasonable charge payment for the services of assistants at surgery in teaching hospitals when qualified residents are available to furnish such services. I certify that the services for which payment is claimed were medically necessary, and that no qualified resident was available to perform the services. I further understand that these services are subject to post-payment review by the Medicare carrier.      Mg Morse MD    11/03/2022  3:06 AM

## 2022-11-03 NOTE — HOSPITAL COURSE
Underwent combined kidney and pancreas transplant 11/03/2022 which was uneventful. Sent to ICU for recovery, extubated.  Immediately making urine. Insulin discontinued within several hours of arrival to ICU. Stepped down to TSU POD#1. Diet advanced 11/5.     Interval History: no acute events overnight. Progressing well. Reports little sleep due to frequent trips to the bathroom to urinate and have BM. Cr 1.0, excellent uop. Able to void able dc removed yesterday. Panc enzymes remain slightly elevated. Panc US 11/7 stable. Amylase from drain sent yesterday due to high drain output, resulted 204. DC drain today. Tolerating diet, no N/V, (+) BM, change bowel regimen to PRN. BP elevated, adjusted anti-hypertensives. Pain well controlled. Encourage ambulation and OOBTC during the day.     SINAN drain removal: Site cleaned with alcohol, 1% lidocaine used for local anesthestic.  3-0 prolene used for suture.  Drain tip intact.  Patient tolerated procedure well.  Will continue to monitor for any complications.

## 2022-11-03 NOTE — PT/OT/SLP EVAL
Physical Therapy Evaluation    Patient Name:  Isabela Read   MRN:  [id number]    Recommendations:     Discharge Recommendations:  home health PT   Discharge Equipment Recommendations:  (TBD pending patient progress)   Barriers to discharge: Decreased caregiver support    Assessment:     Isabela Read is a 27 y.o. female admitted with a medical diagnosis of Type 1 diabetes mellitus with end-stage renal disease (ESRD).  She presents with the following impairments/functional limitations:  weakness, impaired endurance, impaired functional mobility, impaired self care skills, gait instability, impaired balance, visual deficits, pain, decreased safety awareness, decreased lower extremity function, decreased upper extremity function. Patient able to get to chair with modA, no AD despite being in 7/10 pain. Patient tolerated session well.    Rehab Prognosis: Good; patient would benefit from acute skilled PT services to address these deficits and reach maximum level of function.    Recent Surgery: Procedure(s) (LRB):  TRANSPLANT, KIDNEY (Right)  TRANSPLANT, PANCREAS (N/A) 1 Day Post-Op    Plan:     During this hospitalization, patient to be seen 4 x/week to address the identified rehab impairments via gait training, therapeutic activities, therapeutic exercises, neuromuscular re-education and progress toward the following goals:    Plan of Care Expires:  12/03/22    Subjective     Chief Complaint: pain in abdomen  Patient/Family Comments/goals: Patient motivated to perform by herself  Pain/Comfort:  Pain Rating 1: 7/10  Location 1: abdomen  Pain Addressed 1: Pre-medicate for activity, Distraction, Cessation of Activity, Nurse notified  Pain Rating Post-Intervention 1:  (not rated)    Patients cultural, spiritual, Sikhism conflicts given the current situation: no    Living Environment:  Pt lives with mother and brother in a 2SH with bedroom and bathroom on first floor and WIS with built in chair.  Prior to  admission, patients level of function was independent with all functional mobility and driving. Not currently working.  Equipment used at home: none.  DME owned (not currently used): none.  Upon discharge, patient will have assistance from mother and brother.    Objective:     Communicated with RN prior to session.  Patient found supine with peripheral IV, pulse ox (continuous), telemetry, SINAN drain, oxygen, arterial line, dc catheter  upon PT entry to room.    General Precautions: Standard, fall, vision impaired   Orthopedic Precautions:N/A   Braces: N/A  Respiratory Status: Nasal cannula, flow 2 L/min    Exams:  Cognitive Exam:  Patient is oriented to Person, Place, Time, and Situation  Gross Motor Coordination:  WFL  Sensation: WFL for light touch detection BLE  RLE ROM: WFL  RLE Strength: Grossly 4/5 except 3/5 hip flexion  LLE ROM: WFL  LLE Strength: Grossly 4/5 except 3/5 hip flexion    Functional Mobility:  Bed Mobility:     Rolling Right: minimum assistance, VCing  Supine to Sit: moderate assistance  Transfers:     Sit to Stand:  moderate assistance with no AD  Gait: 3ft to bedside chair, modA, no AD  Shuffled steps, decreased step length, decreased speed, decreased foot clearance  Balance  Static Sitting: SBA  LUE support, slight L lean,  Dynamic Sitting: SBA  LUE support  Static Standing: modA to Delphine  Dynamic Standing: modA      AM-PAC 6 CLICK MOBILITY  Total Score:16       Treatment & Education:  Patient educated on role of therapy, goals of session, and benefits of out of bed mobility.   Discussed PT POC during hospitalization.  Pt educated on need to call for assistance.  No questions asked.    Patient left up in chair with all lines intact, call button in reach, and RN present.    GOALS:   Multidisciplinary Problems       Physical Therapy Goals          Problem: Physical Therapy    Goal Priority Disciplines Outcome Goal Variances Interventions   Physical Therapy Goal     PT, PT/OT Ongoing,  Progressing     Description: Goals to be met by: 2022     Patient will increase functional independence with mobility by performin. Supine to sit with Contact Guard Assistance  2. Sit to supine with Contact Guard Assistance  3. Sit to stand transfer with Contact Guard Assistance  4. Gait  x 200 feet with Contact Guard Assistance using No Assistive Device.                          History:     Past Medical History:   Diagnosis Date    Acute kidney injury superimposed on chronic kidney disease 2021    Anemia     Chronic hypertension with exacerbation during pregnancy in second trimester 2020    Current regimen (20):  - Carvedilol 12.5 mg BID - Nifedipine 30 mg daily Baseline CKD + proteinuria    Chronic kidney disease     Depression     Diabetes mellitus     Diabetic retinopathy     Diarrhea     Encounter for blood transfusion     Gastroparesis     Glaucoma     Hepatomegaly 2021    Hx of psychiatric care     Hyperlipidemia     Hypertension     Nephrotic syndrome     Nephrotic syndrome 3/4/2021    Palpitations     Poor fetal growth affecting management of mother in second trimester 10/15/2020    Restrictive lung disease     Retinal detachment     Severe pre-eclampsia in second trimester 2020       Past Surgical History:   Procedure Laterality Date    AV FISTULA PLACEMENT Left 2021    Procedure: CREATION, AV FISTULA;  Surgeon: Roberto Ryan MD;  [facility];  Service: Peripheral Vascular;  Laterality: Left;    COLONOSCOPY N/A 3/16/2022    Procedure: COLONOSCOPY;  Surgeon: Tavo Kwok MD;  Location: [facility] (4TH FLR);  Service: Endoscopy;  Laterality: N/A;  Questionable history of delayed gastric emptying longstanding diabetes now on eating pre transplant workup for history of nausea vomiting which seems to have improved with dialysis also chronic diarrhea and history of anemia pre transplant workup for kidney transplant. 3     ESOPHAGOGASTRODUODENOSCOPY N/A 3/16/2022    Procedure: EGD (ESOPHAGOGASTRODUODENOSCOPY);  Surgeon: Tavo Kwok MD;  Location: [facility] (4TH FLR);  Service: Endoscopy;  Laterality: N/A;  Questionable history of delayed gastric emptying longstanding diabetes now on eating pre transplant workup for history of nausea vomiting which seems to have improved with dialysis also chronic diarrhea and history of anemia pre transplant workup for    FISTULOGRAM N/A 8/11/2021    Procedure: Fistulogram;  Surgeon: Roberto Ryan MD;  Location: [facility] CATH LAB;  Service: Cardiology;  Laterality: N/A;    KIDNEY TRANSPLANT Right 11/2/2022    Procedure: TRANSPLANT, KIDNEY;  Surgeon: Kumar Rodriges Jr., MD;  Location: [facility] OR 2ND FLR;  Service: Transplant;  Laterality: Right;    LASER PHOTOCOAGULATION OF RETINA Right 5/31/2022    Procedure: PHOTOCOAGULATION, RETINA, USING LASER;  Surgeon: Maximilian Montaño MD;  Location: [facility] OR 1ST FLR;  Service: Ophthalmology;  Laterality: Right;    PERCUTANEOUS TRANSLUMINAL ANGIOPLASTY OF ARTERIOVENOUS FISTULA N/A 8/11/2021    Procedure: PTA, AV FISTULA;  Surgeon: Roberto Ryan MD;  Location: [facility] CATH LAB;  Service: Cardiology;  Laterality: N/A;    REMOVAL IMPLANT, POSTERIOR SEGMENT, INTRAOCULAR Right 2/1/2022    Procedure: REMOVAL IMPLANT, POSTERIOR SEGMENT, INTRAOCULAR;  Surgeon: Maximilian Montaño MD;  Location: [facility] OR 1ST FLR;  Service: Ophthalmology;  Laterality: Right;    REPAIR OF RETINAL DETACHMENT WITH VITRECTOMY Right 1/25/2022    Procedure: REPAIR, RETINAL DETACHMENT, WITH VITRECTOMY, MEMBRANE PEEL, LASER, INJECTION OF GAS VS OIL;  Surgeon: Maximilian Montaño MD;  Location: [facility] OR 1ST FLR;  Service: Ophthalmology;  Laterality: Right;    REVISION OF ARTERIOVENOUS FISTULA Left 12/10/2021    Procedure: REVISION, AV FISTULA with BVT;  Surgeon: Roberto Ryan MD;  Location: [facility] OR 2ND FLR;  Service: Peripheral Vascular;  Laterality: Left;     TRANSPLANTATION OF PANCREAS N/A 11/2/2022    Procedure: TRANSPLANT, PANCREAS;  Surgeon: Kumar Rodriges Jr., MD;  Location: [facility] OR [address] [place] [address];  Service: Transplant;  Laterality: N/A;    VITRECTOMY BY PARS PLANA APPROACH Right 2/1/2022    Procedure: VITRECTOMY, PARS PLANA APPROACH;  Surgeon: Maximilian Montaño MD;  Location: [facility] OR [address] [place] [address];  Service: Ophthalmology;  Laterality: Right;    VITRECTOMY BY PARS PLANA APPROACH Right 5/31/2022    Procedure: VITRECTOMY, PARS PLANA APPROACH;  Surgeon: Maximilian Montaño MD;  Location: [facility] OR [address] [place] [address];  Service: Ophthalmology;  Laterality: Right;       Time Tracking:     PT Received On: 11/03/22  PT Start Time: 1047     PT Stop Time: 1120  PT Total Time (min): 33 min     Billable Minutes: Evaluation 10 and Therapeutic Activity 23 11/03/2022

## 2022-11-03 NOTE — PROGRESS NOTES
"[facility] - [facility]  Kidney Transplant  Progress Note      Reason for Follow-up: Reassessment of Kidney, Pancreas Transplant - 11/3/2022  (#1) recipient and management of immunosuppression.    ORGAN:  RIGHT KIDNEY   Donor Type:  Donation after Brain Death     Subjective:   History of Present Illness:  Ms. Read is a 27 y.o. year old  female with ESRD secondary to diabetic nephropathy and HTN.  She has been on the wait list for a kidney transplant at [facility] since 2021. Patient is currently on hemodialysis started on 21. Patient is dialyzing on TTS schedule, last HD 22. Patient reports that she is tolerating dialysis well. She has a LUE AV fistula. She is dialyzing 4 hours per session. Dry weight 61 kg. She reports urinating usually once per day ("normal" amount per urination)    She now presents as a direct admit as primary candidate for a  donor kidney/pancreas transplant. OR times have not been established at this time. Pt is currently not NPO. Dr. Rodriges will be the primary surgeon. Thymo induction. Pre-op labs and imaging have been ordered and will be reviewed prior to transplant. Endocrine consulted to assist with blood sugar management.    Hospital Course:  Underwent combined kidney and pancreas transplant 2022 which was uneventful.  Send to ICU for recovery, extubated.  Immediately making urine.  Insulin discontinued within several hours of arrival to ICU.    Interval History:  Sitting up in chair at the time of my visit, awake, sleepy.  She denies complaints other than some incisional pain, which she states is controlled.  She reports chest pain or shortness of breath.  Urine output has been 100-150 cc/hr earlier day.    Past Medical, Surgical, Family, and Social History:   Unchanged from H&P.    Scheduled Meds:   [START ON 2022] acetaminophen  650 mg Per NG tube Q24H    albumin human 25%  25 g Intravenous Q6H    ampicillin-sulbactim (UNASYN) " IVPB  3 g Intravenous Q6H    [START ON 11/4/2022] antithymocyte globulin (rabbit), hydrocortisone 20mg, with optional heparin 1000 units in NS 500ml (FOR PERIPHERAL LINE ADMINISTRATION ONLY)  1.5 mg/kg (Adjusted) Intravenous Daily    aspirin  81 mg Oral Daily    carvediloL  25 mg Oral BID WM    [START ON 11/4/2022] diphenhydrAMINE  50 mg Intravenous Q24H    [START ON 11/4/2022] ergocalciferol  50,000 Units Oral Q7 Days    famotidine (PF)  20 mg Intravenous Daily    fluconazole (DIFLUCAN) IV (PEDS and ADULTS)  200 mg Intravenous Q24H    heparin (porcine)  5,000 Units Subcutaneous TID    hydrALAZINE  25 mg Oral Q8H    [START ON 11/5/2022] methylPREDNISolone sodium succinate injection  125 mg Intravenous Once    [START ON 11/6/2022] methylPREDNISolone sodium succinate injection  20 mg Intravenous Daily    [START ON 11/4/2022] methylPREDNISolone sodium succinate injection  250 mg Intravenous Once    mupirocin  1 g Nasal BID    mycophenolate mofetil  1,000 mg Per NG tube BID    NIFEdipine  60 mg Oral BID    [START ON 11/10/2022] nystatin  500,000 Units Mouth/Throat QID    [START ON 11/13/2022] sulfamethoxazole-trimethoprim 400-80mg  1 tablet Oral Daily AM    tacrolimus  1 mg Per NG tube BID    [START ON 11/13/2022] valGANciclovir  450 mg Oral Daily    venlafaxine  37.5 mg Oral Daily     Continuous Infusions:   sodium chloride 0.9% 40 mL/hr at 11/03/22 1700    dextrose 5 % and 0.45 % NaCl 50 mL/hr at 11/03/22 1700     PRN Meds:sodium chloride 0.9%, dextrose 10%, dextrose 10%, diphenhydrAMINE, glucagon (human recombinant), hydrALAZINE, insulin aspart U-100, labetalol, morphine, naloxone, ondansetron, oxyCODONE, oxyCODONE    Intake/Output - Last 3 Shifts         11/01 0700 11/02 0659 11/02 0700 11/03 0659 11/03 0700  11/04 0659    P.O.  600 530    I.V. (mL/kg)  360 (5.2) 1423.3 (20.4)    Other  300     IV Piggyback  2800 355    Total Intake(mL/kg)  4060 (58.3) 2308.3 (33.2)    Urine (mL/kg/hr)  550  "(0.3) 1325 (1.8)    Emesis/NG output  0     Drains  75 230    Other  3192     Stool  0     Blood  0     Total Output  3817 1555    Net  +243 +753.3           Urine Occurrence  0 x     Stool Occurrence  0 x     Emesis Occurrence  0 x            Review of Systems   Constitutional: Negative.    HENT: Negative.     Respiratory:  Negative for shortness of breath.    Cardiovascular:  Negative for chest pain and leg swelling.   Gastrointestinal:  Positive for abdominal pain. Negative for nausea and vomiting.   Genitourinary:  Negative for difficulty urinating.   Skin:  Negative for rash.   Allergic/Immunologic: Positive for immunocompromised state.   Neurological: Negative.       Objective:     Vital Signs (Most Recent):  Temp: 98.4 °F (36.9 °C) (11/03/22 1515)  Pulse: 99 (11/03/22 1715)  Resp: 20 (11/03/22 1715)  BP: (!) 163/84 (11/03/22 1647)  SpO2: 95 % (11/03/22 1715)   Vital Signs (24h Range):  Temp:  [98.4 °F (36.9 °C)-99.5 °F (37.5 °C)] 98.4 °F (36.9 °C)  Pulse:  [] 99  Resp:  [8-31] 20  SpO2:  [92 %-100 %] 95 %  BP: (134-179)/() 163/84  Arterial Line BP: (111-181)/() 172/77     Weight: 69.6 kg (153 lb 7 oz)  Height: 5' 4" (162.6 cm)  Body mass index is 26.34 kg/m².    Physical Exam  Constitutional:       General: She is not in acute distress.  Cardiovascular:      Rate and Rhythm: Normal rate and regular rhythm.   Pulmonary:      Effort: Pulmonary effort is normal. No respiratory distress.      Breath sounds: Normal breath sounds.   Abdominal:      General: Bowel sounds are normal.      Palpations: Abdomen is soft. There is no mass.      Tenderness: There is abdominal tenderness.       Laboratory:  Recent Labs   Lab 11/02/22  0038 11/02/22  1419 11/03/22  0454 11/03/22  1100 11/03/22  1610   *  --  134* 141 142   K 4.8   < > 3.6 3.7 3.5   CL 93*  --  99 104 106   CO2 26  --  19* 21* 22*   BUN 40*  --  36* 36* 39*   CREATININE 6.5*  --  5.1* 4.3* 4.1*   CALCIUM 9.2  --  8.5* 8.8 8.7   PHOS " 4.8*  --  2.9 3.5  --     < > = values in this interval not displayed.      Diagnostic Results:  Renal allograft ultrasound 11/03/2022:  There is a transplant kidney identified in the right lower quadrant.  Renal allograft measures 10.6 cm in craniocaudal length.  No perinephric fluid or collection identified.  There is no renal mass or hydronephrosis.  Trace of peritransplant free fluid.  Ureteric stent is in place.    Doppler evaluation demonstrates a peak systolic velocity in the main renal artery of 323 cm/sec.  The main renal artery has a normal waveform without evidence of parvus tardus.  The main renal artery to iliac artery velocity ratio is not elevated measuring 0.95.  The main renal vein is patent.    Pancreas allograft ultrasound 11/03/2022:  Pancreatic allograft is homogeneous in echogenicity.  No peripancreatic fluid collections... Satisfactory grayscale and Doppler evaluation of the pancreas allograft.    Assessment/Plan:     * Type 1 diabetes mellitus with end-stage renal disease (ESRD)  - Now s/p kidney/pancreas transplant        Status post simultaneous kidney and pancreas transplant  -clearing creatinine and off insulin, recovering well so far on POD0      End stage renal failure on dialysis  -creatinine improving, no need for renal replacement therapy    Hypertension, essential  -monitor closely and watch for orthostasis.  Treat as needed.      Prophylactic immunotherapy  -Thymoglobulin, steroids, tacrolimus, mycophenolate initiated  -monitor for toxicities      At risk for opportunistic infections  -start as per protocol          Elmira Celis MD  Kidney Transplant  [facility]

## 2022-11-03 NOTE — ASSESSMENT & PLAN NOTE
BG goal: 140-180    Type 1 DM s/p kidney/pancreas transplant last night. She was on insulin drip after surgery, which was stopped in the morning as sugars were in the 80s.    -Discontinue IIP  -Continue Novolog Low dose correction with ISF 50 starting at 150    - POCT Glucose every hour   - Hypoglycemia protocol in place      ** Please notify Endocrine for any change and/or advance in diet**  ** Please call Endocrine for any BG related issues **     Discharge Planning:   TBD. Please notify endocrinology prior to discharge.

## 2022-11-03 NOTE — PLAN OF CARE
Evaluation complete and POC established.    Problem: Physical Therapy  Goal: Physical Therapy Goal  Description: Goals to be met by: 2022     Patient will increase functional independence with mobility by performin. Supine to sit with Contact Guard Assistance  2. Sit to supine with Contact Guard Assistance  3. Sit to stand transfer with Contact Guard Assistance  4. Gait  x 200 feet with Contact Guard Assistance using No Assistive Device.     Outcome: Ongoing, Progressing

## 2022-11-03 NOTE — ANESTHESIA PROCEDURE NOTES
Arterial    Diagnosis: ESRD    Patient location during procedure: done in OR  Procedure start time: 11/2/2022 10:00 PM  Timeout: 11/2/2022 9:59 PM  Procedure end time: 11/2/2022 10:03 PM    Staffing  Authorizing Provider: Lynn Chaparro MD  Performing Provider: Leigh Roach CRNA    Anesthesiologist was present at the time of the procedure.    Preanesthetic Checklist  Completed: patient identified, IV checked, site marked, risks and benefits discussed, surgical consent, monitors and equipment checked, pre-op evaluation, timeout performed and anesthesia consent givenArterial  Skin Prep: chlorhexidine gluconate  Local Infiltration: none  Orientation: right  Location: radial    Catheter Size: 22 G  Catheter placement by Ultrasound guidance. Heme positive aspiration all ports.   Vessel Caliber: small, patent, compressibility normal  Needle advanced into vessel with real time Ultrasound guidance.Insertion Attempts: 1  Assessment  Dressing: secured with tape and tegaderm  Patient: Tolerated well

## 2022-11-03 NOTE — PLAN OF CARE
Significant events: BG controlled, insulin infusion off since 0700. Hypertensive despite scheduled PO and PRN HTN medications. OOBTC ~6 hrs. Pain controlled with PRN medications.   Vitals/Respiratory: Room air.  O2: >90%.   HR: 's, NSR.  SBP: 130-170.   Temperature max:  98.5 F.   Gtts:  D5 1/2 NS and NS I=O  UOP:  cc/hr,    1.3 L /shift from dc catheter.    Last Bowel Movement: 11/1/22.  SINAN drains: 230 cc/shift, serosanguinous output.   Neuro: Left pupil round and reactive to light. Right pupil fixed, not reactive to light s/p retina surgery. Patient AAOx4, follows commands, and moves all extremities purposefully.     Diet:  NPO except sips with meds.   Labs/Accuchecks:  Q6 hr labs. Accu check Q1 hrs.       Plan:   Continue hourly BG checks and ICU care overnight. Plan to step down from ICU tomorrow.     Skin:  Weight shifting assistance provided Q2 hrs. Chair cushion and waffle mattress in use. Sacral and heel foams in use. No skin breakdown noted.

## 2022-11-03 NOTE — PROGRESS NOTES
TRANSPLANT NOTE:      ORGAN:  RIGHT KIDNEY   Disease Etiology: Diabetes Mellitus - Type I  Donor Type:  Donation after Brain Death   CDC High Risk:  No   Donor CMV Status:   Donor HBcAB:  Negative   Donor HCV Status:  Negative     Isabela Read is a 27 y.o. female s/p   Donation after Brain Death  combined pancreas-kidney transplant on 11/3/2022 (Kidney / Pancreas)   for Diabetes Mellitus - Type I.    This pt will receive Thymoglobulin for induction on POD 0, 1 and 2.  This patients maintenance immunosuppression will include a steroid taper per protocol to 5mg daily, Prograf, and Cellcept.  Opportunistic infection prophylaxis will include Valcyte for 3 months, Bactrim for 6 months and fluconazole for 7 days followed by nystatin for 4 weeks.  Patient to began self medications upon transfer to the TSU,  and I plan to meet with this patient and his/her support person on prior to discharge to review the medication section of the Kidney Transplant Education Manual.  I have reviewed his/her pre-op medications and have restarted those, as appropriate.

## 2022-11-03 NOTE — ANESTHESIA PROCEDURE NOTES
Peripheral IV Insertion    Diagnosis: I99.8 Other disorder of circulatory system and I87.9 Disorder of vein, unspecified.    Patient location during procedure: OR  Procedure start time: 11/2/2022 7:36 PM  Timeout: 11/2/2022 7:30 PM  Procedure end time: 11/2/2022 7:47 PM    Staffing  Authorizing Provider: Lynn Chaparro MD  Performing Provider: Lynn Chaparro MD    Anesthesiologist was present at the time of the procedure.    Preanesthetic Checklist  Completed: patient identified, IV checked, site marked, risks and benefits discussed, surgical consent, monitors and equipment checked, pre-op evaluation, timeout performed and anesthesia consent givenPeripheral IV Insertion  Skin Prep: chlorhexidine gluconate  Orientation: right  Location: neck  Catheter Type: peripheral IV (single lumen) (IJ)  Catheter Size: 14 G  Catheter placement by Ultrasound guidance. Heme positive aspiration all ports.   Vessel Caliber: small, patent, compressibility poor  Vascular Doppler:  not done  Needle advanced into vessel with real time Ultrasound guidance.  Guidewire confirmed in vessel.  Sterile sheath used.  Image recorded and saved.Insertion Attempts: 2  Assessment  Dressing: secured with tape and tegaderm  Patient: Tolerated well  Line flushed easily.

## 2022-11-03 NOTE — PROGRESS NOTES
"[facility]  Endocrinology  Progress Note    Admit Date: 2022     Reason for Consult: Management of T1DM, Hyperglycemia     Surgical Procedure and Date: Kidney/Pancreas Transplant planned for 2022    Diabetes diagnosis year: 8 yrs ago    Home Diabetes Medications:  Levemir 10 units in the am and 12 units q HS, Humalog 10 units with meals + SSI ISF 1:35    How often checking glucose at home? >4 x day Prev used ally waiting on new sensors  BG readings on regimen: Variable low to mid 100s typically  Hypoglycemia on the regimen?  No  Missed doses on regimen?  No    Diabetes Complications include:     Diabetic nephropathy      Complicating diabetes co morbidities:   ESRD      HPI:    Patient is a 27 y.o. female with ESRD secondary to diabetic nephropathy and HTN who presents as a direct admit as primary candidate for a  donor kidney/pancreas transplant. OR times have not been established at this time.  Dr. Rodriges will be the primary surgeon. Thymo induction.  Endocrine consulted to assist with blood sugar management.            Interval HPI:   Overnight events:S/p kidney/pancreas transplant Patient in room 70192/72357 A. Blood glucose improving. BG at goal on current insulin regimen (SSI ). Steroid use- Methylprednisolone  and Dexamethasone  .   1 Day Post-Op  Renal function- Abnormal - Cr 4.3   Vasopressors-  None     Diet NPO Except for: Sips with Medication     Eating:   NPO  Nausea: No  Hypoglycemia and intervention: No  Fever: No  TPN and/or TF: No      /73 (BP Location: Right arm, Patient Position: Sitting)   Pulse 98   Temp 98.4 °F (36.9 °C) (Oral)   Resp 17   Ht 5' 4" (1.626 m)   Wt 69.6 kg (153 lb 7 oz)   SpO2 98%   Breastfeeding No   BMI 26.34 kg/m²     Labs Reviewed and Include    Recent Labs   Lab 22  1100   *   CALCIUM 8.8      K 3.7   CO2 21*      BUN 36*   CREATININE 4.3*     Lab Results   Component Value Date    WBC 15.41 " (H) 11/03/2022    HGB 9.3 (L) 11/03/2022    HCT 26.9 (L) 11/03/2022    MCV 96 11/03/2022     11/03/2022     No results for input(s): TSH, FREET4 in the last 168 hours.  Lab Results   Component Value Date    HGBA1C 11.1 (H) 11/02/2022       Nutritional status:   Body mass index is 26.34 kg/m².  Lab Results   Component Value Date    ALBUMIN 4.0 11/02/2022    ALBUMIN 3.4 (L) 09/14/2022    ALBUMIN 3.6 08/01/2022     No results found for: PREALBUMIN    Estimated Creatinine Clearance: 18.8 mL/min (A) (based on SCr of 4.3 mg/dL (H)).    Accu-Checks  Recent Labs     11/03/22  0610 11/03/22  0713 11/03/22  0756 11/03/22  0910 11/03/22  1011 11/03/22  1043 11/03/22  1211 11/03/22  1306 11/03/22  1408 11/03/22  1440   POCTGLUCOSE 113* 80 81 76 94 115* 119* 118* 117* 128*       Current Medications and/or Treatments Impacting Glycemic Control  Immunotherapy:    Immunosuppressants           Stop Route Frequency     antithymocyte globulin (rabbit) 100 mg, hydrocortisone sodium succinate (SOLU-CORTEF) 20 mg in sodium chloride 0.9% 500 mL (FOR PERIPHERAL LINE ADMINISTRATION ONLY)         11/06 0859 IV Daily     mycophenolate mofetil 200 mg/mL suspension 1,000 mg         -- PER NG TUBE 2 times daily     tacrolimus (PROGRAF) 1 mg/mL oral syringe         -- PER NG TUBE 2 times daily          Steroids:   Hormones (From admission, onward)      Start     Stop Route Frequency Ordered    11/06/22 0900  methylPREDNISolone sodium succinate injection 20 mg         -- IV Daily 11/03/22 0436 11/05/22 0900  methylPREDNISolone sodium succinate injection 125 mg         -- IV Once 11/03/22 0436 11/04/22 0900  methylPREDNISolone sodium succinate injection 250 mg         -- IV Once 11/03/22 0436    11/04/22 0900  antithymocyte globulin (rabbit) 100 mg, hydrocortisone sodium succinate (SOLU-CORTEF) 20 mg in sodium chloride 0.9% 500 mL (FOR PERIPHERAL LINE ADMINISTRATION ONLY)         11/06 3359 IV Daily 11/03/22 0706          Pressors:     Autonomic Drugs (From admission, onward)      None          Hyperglycemia/Diabetes Medications:   Antihyperglycemics (From admission, onward)      Start     Stop Route Frequency Ordered    11/03/22 1306  insulin aspart U-100 pen 0-5 Units         -- SubQ Every 6 hours PRN 11/03/22 1207            ASSESSMENT and PLAN    * Type 1 diabetes mellitus with end-stage renal disease (ESRD)  BG goal: 140-180    Type 1 DM s/p kidney/pancreas transplant last night. She was on insulin drip after surgery, which was stopped in the morning as sugars were in the 80s.    -Discontinue IIP  -Continue Novolog Low dose correction with ISF 50 starting at 150    - POCT Glucose every hour .  Ok from endocrine standpoint to transition to POCT glucose q4 or q6h, but will defer to transplant.  - Hypoglycemia protocol in place      ** Please notify Endocrine for any change and/or advance in diet**  ** Please call Endocrine for any BG related issues **     Discharge Planning:   TBD. Please notify endocrinology prior to discharge.         Status post simultaneous kidney and pancreas transplant  Managed by primary  Optimize BG for surgical wound healing.         End stage renal failure on dialysis  Titrate insulin slowly to avoid hypoglycemia as the risk of hypoglycemia increases with decreased creatinine clearance.              Deacon Cervantes PA-C  Endocrinology  [facility]

## 2022-11-03 NOTE — CONSULTS
Single lumen 20 x 8 cm midline placed R basilic vein. Max dwell date 12/2/22, Lot# [id number].  Needle advanced into the vessel under real time ultrasound guidance.  Image recorded and saved.

## 2022-11-04 LAB
ALBUMIN SERPL BCP-MCNC: 4.4 G/DL (ref 3.5–5.2)
AMYLASE SERPL-CCNC: 138 U/L (ref 20–110)
ANION GAP SERPL CALC-SCNC: 17 MMOL/L (ref 8–16)
BASOPHILS # BLD AUTO: 0.01 K/UL (ref 0–0.2)
BASOPHILS NFR BLD: 0.1 % (ref 0–1.9)
BLD PROD TYP BPU: NORMAL
BLOOD UNIT EXPIRATION DATE: NORMAL
BLOOD UNIT TYPE CODE: 5100
BLOOD UNIT TYPE: NORMAL
BUN SERPL-MCNC: 46 MG/DL (ref 6–20)
CALCIUM SERPL-MCNC: 8.9 MG/DL (ref 8.7–10.5)
CHLORIDE SERPL-SCNC: 106 MMOL/L (ref 95–110)
CMV IGG SERPL QL IA: REACTIVE
CO2 SERPL-SCNC: 21 MMOL/L (ref 23–29)
CODING SYSTEM: NORMAL
CREAT SERPL-MCNC: 4.4 MG/DL (ref 0.5–1.4)
DIFFERENTIAL METHOD: ABNORMAL
DISPENSE STATUS: NORMAL
EOSINOPHIL # BLD AUTO: 0 K/UL (ref 0–0.5)
EOSINOPHIL NFR BLD: 0 % (ref 0–8)
ERYTHROCYTE [DISTWIDTH] IN BLOOD BY AUTOMATED COUNT: 14.6 % (ref 11.5–14.5)
EST. GFR  (NO RACE VARIABLE): 13.4 ML/MIN/1.73 M^2
GLUCOSE SERPL-MCNC: 104 MG/DL (ref 70–110)
HBV SURFACE AB SER QL IA: POSITIVE
HBV SURFACE AB SERPL IA-ACNC: NORMAL MIU/ML
HCT VFR BLD AUTO: 21.6 % (ref 37–48.5)
HGB BLD-MCNC: 7.1 G/DL (ref 12–16)
IMM GRANULOCYTES # BLD AUTO: 0.03 K/UL (ref 0–0.04)
IMM GRANULOCYTES NFR BLD AUTO: 0.3 % (ref 0–0.5)
LIPASE SERPL-CCNC: 64 U/L (ref 4–60)
LIPASE SERPL-CCNC: 74 U/L (ref 4–60)
LIPASE SERPL-CCNC: 85 U/L (ref 4–60)
LYMPHOCYTES # BLD AUTO: 0.3 K/UL (ref 1–4.8)
LYMPHOCYTES NFR BLD: 2.8 % (ref 18–48)
MAGNESIUM SERPL-MCNC: 1.8 MG/DL (ref 1.6–2.6)
MAGNESIUM SERPL-MCNC: 2.2 MG/DL (ref 1.6–2.6)
MCH RBC QN AUTO: 32.4 PG (ref 27–31)
MCHC RBC AUTO-ENTMCNC: 32.9 G/DL (ref 32–36)
MCV RBC AUTO: 99 FL (ref 82–98)
MONOCYTES # BLD AUTO: 0.4 K/UL (ref 0.3–1)
MONOCYTES NFR BLD: 3.5 % (ref 4–15)
NEUTROPHILS # BLD AUTO: 9.2 K/UL (ref 1.8–7.7)
NEUTROPHILS NFR BLD: 93.3 % (ref 38–73)
NRBC BLD-RTO: 0 /100 WBC
NUM UNITS TRANS PACKED RBC: NORMAL
PHOSPHATE SERPL-MCNC: 4.8 MG/DL (ref 2.7–4.5)
PHOSPHATE SERPL-MCNC: 6.3 MG/DL (ref 2.7–4.5)
PHOSPHATE SERPL-MCNC: 6.4 MG/DL (ref 2.7–4.5)
PLATELET # BLD AUTO: 146 K/UL (ref 150–450)
PMV BLD AUTO: 12 FL (ref 9.2–12.9)
POCT GLUCOSE: 105 MG/DL (ref 70–110)
POCT GLUCOSE: 125 MG/DL (ref 70–110)
POCT GLUCOSE: 142 MG/DL (ref 70–110)
POCT GLUCOSE: 146 MG/DL (ref 70–110)
POTASSIUM SERPL-SCNC: 3.4 MMOL/L (ref 3.5–5.1)
RBC # BLD AUTO: 2.19 M/UL (ref 4–5.4)
SODIUM SERPL-SCNC: 144 MMOL/L (ref 136–145)
TACROLIMUS BLD-MCNC: <2 NG/ML (ref 5–15)
WBC # BLD AUTO: 9.86 K/UL (ref 3.9–12.7)

## 2022-11-04 PROCEDURE — 63600175 PHARM REV CODE 636 W HCPCS: Performed by: STUDENT IN AN ORGANIZED HEALTH CARE EDUCATION/TRAINING PROGRAM

## 2022-11-04 PROCEDURE — 25000003 PHARM REV CODE 250: Performed by: STUDENT IN AN ORGANIZED HEALTH CARE EDUCATION/TRAINING PROGRAM

## 2022-11-04 PROCEDURE — P9045 ALBUMIN (HUMAN), 5%, 250 ML: HCPCS | Mod: JG

## 2022-11-04 PROCEDURE — 80069 RENAL FUNCTION PANEL: CPT | Performed by: STUDENT IN AN ORGANIZED HEALTH CARE EDUCATION/TRAINING PROGRAM

## 2022-11-04 PROCEDURE — S5010 5% DEXTROSE AND 0.45% SALINE: HCPCS | Performed by: STUDENT IN AN ORGANIZED HEALTH CARE EDUCATION/TRAINING PROGRAM

## 2022-11-04 PROCEDURE — 83735 ASSAY OF MAGNESIUM: CPT | Performed by: STUDENT IN AN ORGANIZED HEALTH CARE EDUCATION/TRAINING PROGRAM

## 2022-11-04 PROCEDURE — 85025 COMPLETE CBC W/AUTO DIFF WBC: CPT | Performed by: STUDENT IN AN ORGANIZED HEALTH CARE EDUCATION/TRAINING PROGRAM

## 2022-11-04 PROCEDURE — 83690 ASSAY OF LIPASE: CPT | Mod: 91 | Performed by: STUDENT IN AN ORGANIZED HEALTH CARE EDUCATION/TRAINING PROGRAM

## 2022-11-04 PROCEDURE — P9016 RBC LEUKOCYTES REDUCED: HCPCS | Performed by: STUDENT IN AN ORGANIZED HEALTH CARE EDUCATION/TRAINING PROGRAM

## 2022-11-04 PROCEDURE — 81001 URINALYSIS AUTO W/SCOPE: CPT | Performed by: PHYSICIAN ASSISTANT

## 2022-11-04 PROCEDURE — 20600001 HC STEP DOWN PRIVATE ROOM

## 2022-11-04 PROCEDURE — 25000003 PHARM REV CODE 250: Performed by: TRANSPLANT SURGERY

## 2022-11-04 PROCEDURE — 99233 SBSQ HOSP IP/OBS HIGH 50: CPT | Mod: 24,,, | Performed by: STUDENT IN AN ORGANIZED HEALTH CARE EDUCATION/TRAINING PROGRAM

## 2022-11-04 PROCEDURE — 63600175 PHARM REV CODE 636 W HCPCS

## 2022-11-04 PROCEDURE — 99232 SBSQ HOSP IP/OBS MODERATE 35: CPT | Mod: ,,, | Performed by: PHYSICIAN ASSISTANT

## 2022-11-04 PROCEDURE — 36430 TRANSFUSION BLD/BLD COMPNT: CPT

## 2022-11-04 PROCEDURE — 87086 URINE CULTURE/COLONY COUNT: CPT | Performed by: PHYSICIAN ASSISTANT

## 2022-11-04 PROCEDURE — 80197 ASSAY OF TACROLIMUS: CPT | Performed by: STUDENT IN AN ORGANIZED HEALTH CARE EDUCATION/TRAINING PROGRAM

## 2022-11-04 PROCEDURE — 84100 ASSAY OF PHOSPHORUS: CPT | Performed by: STUDENT IN AN ORGANIZED HEALTH CARE EDUCATION/TRAINING PROGRAM

## 2022-11-04 PROCEDURE — 82150 ASSAY OF AMYLASE: CPT | Performed by: STUDENT IN AN ORGANIZED HEALTH CARE EDUCATION/TRAINING PROGRAM

## 2022-11-04 PROCEDURE — 99232 PR SUBSEQUENT HOSPITAL CARE,LEVL II: ICD-10-PCS | Mod: ,,, | Performed by: PHYSICIAN ASSISTANT

## 2022-11-04 PROCEDURE — 99233 PR SUBSEQUENT HOSPITAL CARE,LEVL III: ICD-10-PCS | Mod: 24,,, | Performed by: STUDENT IN AN ORGANIZED HEALTH CARE EDUCATION/TRAINING PROGRAM

## 2022-11-04 RX ORDER — ONDANSETRON 2 MG/ML
4 INJECTION INTRAMUSCULAR; INTRAVENOUS ONCE
Status: COMPLETED | OUTPATIENT
Start: 2022-11-04 | End: 2022-11-04

## 2022-11-04 RX ORDER — ALBUMIN HUMAN 50 G/1000ML
12.5 SOLUTION INTRAVENOUS ONCE
Status: COMPLETED | OUTPATIENT
Start: 2022-11-04 | End: 2022-11-04

## 2022-11-04 RX ORDER — FUROSEMIDE 10 MG/ML
60 INJECTION INTRAMUSCULAR; INTRAVENOUS ONCE
Status: COMPLETED | OUTPATIENT
Start: 2022-11-04 | End: 2022-11-04

## 2022-11-04 RX ADMIN — MUPIROCIN 1 G: 20 OINTMENT TOPICAL at 09:11

## 2022-11-04 RX ADMIN — DIPHENHYDRAMINE HYDROCHLORIDE 50 MG: 50 INJECTION, SOLUTION INTRAMUSCULAR; INTRAVENOUS at 09:11

## 2022-11-04 RX ADMIN — ANTI-THYMOCYTE GLOBULIN (RABBIT) 100 MG: 5 INJECTION, POWDER, LYOPHILIZED, FOR SOLUTION INTRAVENOUS at 09:11

## 2022-11-04 RX ADMIN — ERGOCALCIFEROL 50000 UNITS: 1.25 CAPSULE ORAL at 08:11

## 2022-11-04 RX ADMIN — TACROLIMUS 2 MG: 1 CAPSULE ORAL at 06:11

## 2022-11-04 RX ADMIN — OXYCODONE 5 MG: 5 TABLET ORAL at 06:11

## 2022-11-04 RX ADMIN — MUPIROCIN 1 G: 20 OINTMENT TOPICAL at 08:11

## 2022-11-04 RX ADMIN — HYDRALAZINE HYDROCHLORIDE 25 MG: 25 TABLET, FILM COATED ORAL at 09:11

## 2022-11-04 RX ADMIN — NIFEDIPINE 60 MG: 30 TABLET, FILM COATED, EXTENDED RELEASE ORAL at 09:11

## 2022-11-04 RX ADMIN — FUROSEMIDE 60 MG: 10 INJECTION, SOLUTION INTRAMUSCULAR; INTRAVENOUS at 09:11

## 2022-11-04 RX ADMIN — MYCOPHENOLATE MOFETIL 1000 MG: 200 POWDER, FOR SUSPENSION ORAL at 09:11

## 2022-11-04 RX ADMIN — NIFEDIPINE 60 MG: 30 TABLET, FILM COATED, EXTENDED RELEASE ORAL at 08:11

## 2022-11-04 RX ADMIN — HEPARIN SODIUM 5000 UNITS: 5000 INJECTION INTRAVENOUS; SUBCUTANEOUS at 09:11

## 2022-11-04 RX ADMIN — ALBUMIN (HUMAN) 12.5 G: 12.5 SOLUTION INTRAVENOUS at 05:11

## 2022-11-04 RX ADMIN — CARVEDILOL 25 MG: 25 TABLET, FILM COATED ORAL at 08:11

## 2022-11-04 RX ADMIN — DEXTROSE AND SODIUM CHLORIDE: 5; .45 INJECTION, SOLUTION INTRAVENOUS at 11:11

## 2022-11-04 RX ADMIN — OXYCODONE HYDROCHLORIDE 10 MG: 10 TABLET ORAL at 02:11

## 2022-11-04 RX ADMIN — METHYLPREDNISOLONE SODIUM SUCCINATE 250 MG: 125 INJECTION, POWDER, FOR SOLUTION INTRAMUSCULAR; INTRAVENOUS at 09:11

## 2022-11-04 RX ADMIN — FLUCONAZOLE IN SODIUM CHLORIDE 200 MG: 2 INJECTION, SOLUTION INTRAVENOUS at 05:11

## 2022-11-04 RX ADMIN — TACROLIMUS 1 MG: 1 CAPSULE, GELATIN COATED ORAL at 02:11

## 2022-11-04 RX ADMIN — ONDANSETRON 4 MG: 2 INJECTION INTRAMUSCULAR; INTRAVENOUS at 02:11

## 2022-11-04 RX ADMIN — HYDRALAZINE HYDROCHLORIDE 25 MG: 25 TABLET, FILM COATED ORAL at 05:11

## 2022-11-04 RX ADMIN — HEPARIN SODIUM 5000 UNITS: 5000 INJECTION INTRAVENOUS; SUBCUTANEOUS at 10:11

## 2022-11-04 RX ADMIN — ASPIRIN 81 MG CHEWABLE TABLET 81 MG: 81 TABLET CHEWABLE at 08:11

## 2022-11-04 RX ADMIN — ACETAMINOPHEN 650 MG: 650 SOLUTION ORAL at 08:11

## 2022-11-04 RX ADMIN — OXYCODONE HYDROCHLORIDE 10 MG: 10 TABLET ORAL at 08:11

## 2022-11-04 RX ADMIN — FAMOTIDINE 20 MG: 10 INJECTION, SOLUTION INTRAVENOUS at 08:11

## 2022-11-04 RX ADMIN — TACROLIMUS 1 MG: 1 CAPSULE, GELATIN COATED ORAL at 08:11

## 2022-11-04 RX ADMIN — HEPARIN SODIUM 5000 UNITS: 5000 INJECTION INTRAVENOUS; SUBCUTANEOUS at 03:11

## 2022-11-04 RX ADMIN — DEXTROSE AND SODIUM CHLORIDE: 5; .45 INJECTION, SOLUTION INTRAVENOUS at 02:11

## 2022-11-04 RX ADMIN — ALBUMIN (HUMAN) 12.5 G: 12.5 SOLUTION INTRAVENOUS at 01:11

## 2022-11-04 RX ADMIN — VENLAFAXINE HYDROCHLORIDE 37.5 MG: 37.5 CAPSULE, EXTENDED RELEASE ORAL at 08:11

## 2022-11-04 NOTE — PROGRESS NOTES
[facility]  Critical Care - Surgery  Progress Note    Patient Name: Isabela Read  MRN: [id number]  Admission Date: 11/2/2022  Hospital Length of Stay: 2 days  Code Status: Full Code  Attending Provider: Kumar Rodriges Jr., MD  Primary Care Provider: Tavo Bhatia MD   Principal Problem: Type 1 diabetes mellitus with end-stage renal disease (ESRD)    Subjective:     Hospital/ICU Course:  No notes on file    Interval History/Significant Events:   No acute events overnight. Patient doing well without concerns or complaints. Pain well controlled. Patient was out of bed, in the chair yesterday. No NGT in place, but remains NPO. No nausea/vomiting but reports some hiccups. No flatus or Bms. BP better controlled. Afebrile, VSS.    Follow-up For: Procedure(s) (LRB):  TRANSPLANT, KIDNEY (Right)  TRANSPLANT, PANCREAS (N/A)    Post-Operative Day: 2 Days Post-Op    Objective:     Vital Signs (Most Recent):  Temp: 98.2 °F (36.8 °C) (11/04/22 0300)  Pulse: 97 (11/04/22 0630)  Resp: 15 (11/04/22 0600)  BP: 122/62 (11/04/22 0600)  SpO2: 100 % (11/04/22 0630) Vital Signs (24h Range):  Temp:  [97.9 °F (36.6 °C)-98.5 °F (36.9 °C)] 98.2 °F (36.8 °C)  Pulse:  [] 97  Resp:  [8-31] 15  SpO2:  [90 %-100 %] 100 %  BP: ()/(46-84) 122/62  Arterial Line BP: (104-279)/() 140/70     Weight: 69.6 kg (153 lb 7 oz)  Body mass index is 26.34 kg/m².      Intake/Output Summary (Last 24 hours) at 11/4/2022 0741  Last data filed at 11/4/2022 0600  Gross per 24 hour   Intake 3604.69 ml   Output 1880 ml   Net 1724.69 ml       Physical Exam  Vitals reviewed.   Constitutional:       Appearance: Normal appearance.   HENT:      Head: Normocephalic and atraumatic.   Eyes:      Extraocular Movements: Extraocular movements intact.      Pupils: Pupils are equal, round, and reactive to light.   Cardiovascular:      Rate and Rhythm: Normal rate and regular rhythm.      Pulses: Normal pulses.   Pulmonary:       Effort: Pulmonary effort is normal.   Abdominal:      Palpations: Abdomen is soft.      Comments: Appropriately tender to palpation. Midline dressing in place, minimal strike through. SINAN drain serosanguinous.   Genitourinary:     Comments: Moncada in place with pink tinged urine  Musculoskeletal:         General: Normal range of motion.      Cervical back: Neck supple.   Skin:     General: Skin is warm and dry.      Capillary Refill: Capillary refill takes less than 2 seconds.   Neurological:      General: No focal deficit present.      Mental Status: She is alert and oriented to person, place, and time.   Psychiatric:         Mood and Affect: Mood normal.       Vents:       Lines/Drains/Airways       Drain  Duration                  Hemodialysis AV Fistula Left upper arm -- days         Closed/Suction Drain 11/03/22 0407 Right LUQ Bulb 19 Fr. 1 day         Urethral Catheter 11/02/22 2140 Triple-lumen;Non-latex;Straight-tip;Silicone 20 Fr. 1 day              Arterial Line  Duration             Arterial Line 11/02/22 2200 Right Radial 1 day              Peripheral Intravenous Line  Duration                  Peripheral IV - Single Lumen 11/03/22 0600 20 G Right Antecubital 1 day         Peripheral IV - Single Lumen 11/03/22 0600 22 G Right;Distal Forearm 1 day         Midline Catheter Insertion/Assessment  - Single Lumen 11/03/22 1032 Right basilic vein (medial side of arm) 20g x 8cm <1 day                    Significant Labs:    CBC/Anemia Profile:  Recent Labs   Lab 11/03/22  1100 11/03/22  1610 11/04/22  0418   WBC 15.41* 12.31 9.86   HGB 9.3* 8.3* 7.1*   HCT 26.9* 24.4* 21.6*    177 146*   MCV 96 96 99*   RDW 14.1 14.2 14.6*        Chemistries:  Recent Labs   Lab 11/03/22  1100 11/03/22  1610 11/03/22  1730 11/03/22  2311 11/04/22  0418    142  --   --  144   K 3.7 3.5  --   --  3.4*    106  --   --  106   CO2 21* 22*  --   --  21*   BUN 36* 39*  --   --  46*   CREATININE 4.3* 4.1*  --   --  4.4*    CALCIUM 8.8 8.7  --   --  8.9   ALBUMIN  --   --   --   --  4.4   MG 2.0  --  1.9 1.8 2.2   PHOS 3.5  --  4.2 4.8* 6.4*  6.3*       All pertinent labs within the past 24 hours have been reviewed.    Significant Imaging:  I have reviewed all pertinent imaging results/findings within the past 24 hours.    Assessment/Plan:     Status post simultaneous kidney and pancreas transplant  20yo F with PMHx of ESRD 2/2 HTN and DM1 now s/p kidney pancreas transplant.      Neuro/Psych:   -- Pain: Tylenol, oxycodone, morphine PRN             Cards:   -- HDS, no requirement for any drips  -- Continue home carvedilol, hydralazine, nifedipine plus PRNs      Pulm:   -- Goal O2 sat > 90%  -- NC O2 PRN  -- Wean as able      Renal:  -- Keep dc for strict I/O  -- Trend BUN/Cr. Cr 4.4 from 4.1  -- Daily BMP      FEN / GI:   -- Replace lytes as needed  -- Nutrition: NPO today, potentially trial CLD tomorrow  -- Anti-rejection meds: Thymoglobulin, diphenhydramine, methylpred, mycophenalte, tacro; Daily tacro levels  -- US Panc/Kidney on 11/4 WNL, repeat in 1 week  -- Amylase, Lipase daily, downtrending       ID:   -- Tm: Afebrile, WBC WNL  -- Abx/antifungals/antivirals: bactrim, diflucan, valganciclovir, nystatin suspension       Heme/Onc:   -- Hgb 7.1 from 8.3  -- Transfuse 1 unit pRBC  -- Daily CBC  -- CTM      Endo:   -- Gluc goal 140-180  -- SSI  -- Endocrine on board for recs       PPx:   Feeding: NPO  Analgesia/Sedation: Multimodals PRN / not indicated  Thromboembolic prevention: SQH  HOB >30: Yes  Stress Ulcer ppx: Famotidine   Glucose control: Critical care goal 140-180 g/dl, ISS     Lines/Drains/Airway: A line, SINAN drain x1      Dispo/Code Status/Palliative:   -- SICU / Full Code  -- Likely step down today       Critical care was time spent personally by me on the following activities: development of treatment plan with patient or surrogate and bedside caregivers, discussions with consultants, evaluation of patient's  response to treatment, examination of patient, ordering and performing treatments and interventions, ordering and review of laboratory studies, ordering and review of radiographic studies, pulse oximetry, re-evaluation of patient's condition.  This critical care time did not overlap with that of any other provider or involve time for any procedures.     Jim Panchal MD  Critical Care - Surgery  [facility]

## 2022-11-04 NOTE — PLAN OF CARE
"      SICU PLAN OF CARE NOTE    Dx: Type 1 diabetes mellitus with end-stage renal disease (ESRD)    Shift Events: given 500cc of 25% albumin(see previous note for details), lab results reported to CECI WARREN, placed on 1L NC as pt started to desat intermittently to 87% on RA, pt refused getting OOBTC for 5am but says she will get up to the chair in a couple hours    Goals of Care: wean o2, trend lab levels, monitor UOP    Neuro: AAO x4, Follows Commands, and Moves All Extremities    Vital Signs: /62 (BP Location: Right arm, Patient Position: Lying)   Pulse 95   Temp 98.2 °F (36.8 °C) (Oral)   Resp 15   Ht 5' 4" (1.626 m)   Wt 69.6 kg (153 lb 7 oz)   SpO2 96%   Breastfeeding No   BMI 26.34 kg/m²     Respiratory: Nasal Cannula    Diet: Sips with Meds    Gtts: MIVF    Urine Output: Urinary Catheter 245 cc/shift    Drains: SINAN Drain, total output 90 cc / shift     Labs/Accuchecks: daily/q1h    Skin: mid abd incision dressing CDI, R flank SINAN site healing, no other breakdown, foams to heels and sacrum, pt shifts weight independently, WCTM       "

## 2022-11-04 NOTE — NURSING
Pt transferred from SICU to [facility] [id number]. Vital signs obtained, VSS, on 1 L NC. Bed in lowest position, wheels locked, call light within pt reach. SCDs intact. Pt oriented to room & staff. TIMMY Vivar PA-C, notified.

## 2022-11-04 NOTE — PLAN OF CARE
Pt AAOx4. VSS, afebrile, on 1 L NC. Pain controlled w/ PRN Oxycodone. Pt on clear liquid diet. Blood glucose monitoring ACHS. Moncada intact w/ 585 cc/shift (dark red tinged urine). D51/2 NS @ 100 cc/hr. Thymo #2 completed, pt tolerated well. R SINAN drain intact w/ 380 cc/shift SS output. SCDs on pt. Blue card updated, prepared for pt teaching. Bed in lowest position, wheels locked, call light within pt reach. Pt updated on plan of care.

## 2022-11-04 NOTE — SUBJECTIVE & OBJECTIVE
Interval History/Significant Events:   No acute events overnight. Patient doing well without concerns or complaints. Pain well controlled. Patient was out of bed, in the chair yesterday. No NGT in place, but remains NPO. No nausea/vomiting but reports some hiccups. No flatus or Bms. BP better controlled. Afebrile, VSS.    Follow-up For: Procedure(s) (LRB):  TRANSPLANT, KIDNEY (Right)  TRANSPLANT, PANCREAS (N/A)    Post-Operative Day: 2 Days Post-Op    Objective:     Vital Signs (Most Recent):  Temp: 98.2 °F (36.8 °C) (11/04/22 0300)  Pulse: 97 (11/04/22 0630)  Resp: 15 (11/04/22 0600)  BP: 122/62 (11/04/22 0600)  SpO2: 100 % (11/04/22 0630) Vital Signs (24h Range):  Temp:  [97.9 °F (36.6 °C)-98.5 °F (36.9 °C)] 98.2 °F (36.8 °C)  Pulse:  [] 97  Resp:  [8-31] 15  SpO2:  [90 %-100 %] 100 %  BP: ()/(46-84) 122/62  Arterial Line BP: (104-279)/() 140/70     Weight: 69.6 kg (153 lb 7 oz)  Body mass index is 26.34 kg/m².      Intake/Output Summary (Last 24 hours) at 11/4/2022 0741  Last data filed at 11/4/2022 0600  Gross per 24 hour   Intake 3604.69 ml   Output 1880 ml   Net 1724.69 ml       Physical Exam  Vitals reviewed.   Constitutional:       Appearance: Normal appearance.   HENT:      Head: Normocephalic and atraumatic.   Eyes:      Extraocular Movements: Extraocular movements intact.      Pupils: Pupils are equal, round, and reactive to light.   Cardiovascular:      Rate and Rhythm: Normal rate and regular rhythm.      Pulses: Normal pulses.   Pulmonary:      Effort: Pulmonary effort is normal.   Abdominal:      Palpations: Abdomen is soft.      Comments: Appropriately tender to palpation. Midline dressing in place, minimal strike through. SINAN drain serosanguinous.   Genitourinary:     Comments: Moncada in place with pink tinged urine  Musculoskeletal:         General: Normal range of motion.      Cervical back: Neck supple.   Skin:     General: Skin is warm and dry.      Capillary Refill: Capillary  refill takes less than 2 seconds.   Neurological:      General: No focal deficit present.      Mental Status: She is alert and oriented to person, place, and time.   Psychiatric:         Mood and Affect: Mood normal.       Vents:       Lines/Drains/Airways       Drain  Duration                  Hemodialysis AV Fistula Left upper arm -- days         Closed/Suction Drain 11/03/22 0407 Right LUQ Bulb 19 Fr. 1 day         Urethral Catheter 11/02/22 2140 Triple-lumen;Non-latex;Straight-tip;Silicone 20 Fr. 1 day              Arterial Line  Duration             Arterial Line 11/02/22 2200 Right Radial 1 day              Peripheral Intravenous Line  Duration                  Peripheral IV - Single Lumen 11/03/22 0600 20 G Right Antecubital 1 day         Peripheral IV - Single Lumen 11/03/22 0600 22 G Right;Distal Forearm 1 day         Midline Catheter Insertion/Assessment  - Single Lumen 11/03/22 1032 Right basilic vein (medial side of arm) 20g x 8cm <1 day                    Significant Labs:    CBC/Anemia Profile:  Recent Labs   Lab 11/03/22  1100 11/03/22  1610 11/04/22  0418   WBC 15.41* 12.31 9.86   HGB 9.3* 8.3* 7.1*   HCT 26.9* 24.4* 21.6*    177 146*   MCV 96 96 99*   RDW 14.1 14.2 14.6*        Chemistries:  Recent Labs   Lab 11/03/22  1100 11/03/22  1610 11/03/22  1730 11/03/22  2311 11/04/22  0418    142  --   --  144   K 3.7 3.5  --   --  3.4*    106  --   --  106   CO2 21* 22*  --   --  21*   BUN 36* 39*  --   --  46*   CREATININE 4.3* 4.1*  --   --  4.4*   CALCIUM 8.8 8.7  --   --  8.9   ALBUMIN  --   --   --   --  4.4   MG 2.0  --  1.9 1.8 2.2   PHOS 3.5  --  4.2 4.8* 6.4*  6.3*       All pertinent labs within the past 24 hours have been reviewed.    Significant Imaging:  I have reviewed all pertinent imaging results/findings within the past 24 hours.

## 2022-11-04 NOTE — PROGRESS NOTES
"[facility]  Endocrinology  Progress Note    Admit Date: 2022     Reason for Consult: Management of T1DM, Hyperglycemia     Surgical Procedure and Date: Kidney/Pancreas Transplant planned for 2022    Diabetes diagnosis year: 8 yrs ago    Home Diabetes Medications:  Levemir 10 units in the am and 12 units q HS, Humalog 10 units with meals + SSI ISF 1:35    How often checking glucose at home? >4 x day Prev used ally waiting on new sensors  BG readings on regimen: Variable low to mid 100s typically  Hypoglycemia on the regimen?  No  Missed doses on regimen?  No    Diabetes Complications include:     Diabetic nephropathy      Complicating diabetes co morbidities:   ESRD      HPI:    Patient is a 27 y.o. female with ESRD secondary to diabetic nephropathy and HTN who presents as a direct admit as primary candidate for a  donor kidney/pancreas transplant. OR times have not been established at this time.  Dr. Rodriges will be the primary surgeon. Thymo induction.  Endocrine consulted to assist with blood sugar management.            Interval HPI:   Overnight events:No acute events overnight. Patient in room 20270/80254 A. Blood glucose stable. BG at and below goal on current insulin regimen (SSI ). Steroid use- Methylprednisolone  250 mg IV ordered for today.   2 Days Post-Op  Renal function- Abnormal - Cr 4.4   Vasopressors-  None     Diet NPO Except for: Sips with Medication     Eating:   NPO  Nausea: No  Hypoglycemia and intervention: No  Fever: No  TPN and/or TF: No      /63   Pulse 94   Temp 97.8 °F (36.6 °C)   Resp 12   Ht 5' 4" (1.626 m)   Wt 69.6 kg (153 lb 7 oz)   SpO2 99%   Breastfeeding No   BMI 26.34 kg/m²     Labs Reviewed and Include    Recent Labs   Lab 22  0418      CALCIUM 8.9   ALBUMIN 4.4      K 3.4*   CO2 21*      BUN 46*   CREATININE 4.4*     Lab Results   Component Value Date    WBC 9.86 2022    HGB 7.1 (L) 2022    " HCT 21.6 (L) 11/04/2022    MCV 99 (H) 11/04/2022     (L) 11/04/2022     No results for input(s): TSH, FREET4 in the last 168 hours.  Lab Results   Component Value Date    HGBA1C 11.1 (H) 11/02/2022       Nutritional status:   Body mass index is 26.34 kg/m².  Lab Results   Component Value Date    ALBUMIN 4.4 11/04/2022    ALBUMIN 4.0 11/02/2022    ALBUMIN 3.4 (L) 09/14/2022     No results found for: PREALBUMIN    Estimated Creatinine Clearance: 18.4 mL/min (A) (based on SCr of 4.4 mg/dL (H)).    Accu-Checks  Recent Labs     11/03/22  0713 11/03/22  0756 11/03/22  0910 11/03/22  1011 11/03/22  1043 11/03/22  1211 11/03/22  1306 11/03/22  1408 11/03/22  1440 11/04/22  0739   POCTGLUCOSE 80 81 76 94 115* 119* 118* 117* 128* 105       Current Medications and/or Treatments Impacting Glycemic Control  Immunotherapy:    Immunosuppressants           Stop Route Frequency     antithymocyte globulin (rabbit) 100 mg, hydrocortisone sodium succinate (SOLU-CORTEF) 20 mg in sodium chloride 0.9% 500 mL (FOR PERIPHERAL LINE ADMINISTRATION ONLY)         11/06 0859 IV Daily     mycophenolate mofetil 200 mg/mL suspension 1,000 mg         -- PER NG TUBE 2 times daily     tacrolimus (PROGRAF) 1 mg/mL oral syringe         -- PER NG TUBE 2 times daily          Steroids:   Hormones (From admission, onward)      Start     Stop Route Frequency Ordered    11/06/22 0900  methylPREDNISolone sodium succinate injection 20 mg         -- IV Daily 11/03/22 0436 11/05/22 0900  methylPREDNISolone sodium succinate injection 125 mg         -- IV Once 11/03/22 0436 11/04/22 0900  methylPREDNISolone sodium succinate injection 250 mg         -- IV Once 11/03/22 0436 11/04/22 0900  antithymocyte globulin (rabbit) 100 mg, hydrocortisone sodium succinate (SOLU-CORTEF) 20 mg in sodium chloride 0.9% 500 mL (FOR PERIPHERAL LINE ADMINISTRATION ONLY)         11/06 0859 IV Daily 11/03/22 0436          Pressors:    Autonomic Drugs (From admission,  onward)      None          Hyperglycemia/Diabetes Medications:   Antihyperglycemics (From admission, onward)      Start     Stop Route Frequency Ordered    11/03/22 1306  insulin aspart U-100 pen 0-5 Units         -- SubQ Every 6 hours PRN 11/03/22 1207            ASSESSMENT and PLAN    * Type 1 diabetes mellitus with end-stage renal disease (ESRD)  BG goal: 140-180     Type 1 DM s/p kidney/pancreas transplant 11/3/2022. Insulin drip was able to be stopped after sx. She has not required insulin since then.  Will continue to monitor while pt on steroids and diet advanced.    -Continue Novolog Low dose correction with ISF 50 starting at 150    - POCT Glucose before meals and at bedtime   - Hypoglycemia protocol in place      ** Please notify Endocrine for any change and/or advance in diet**  ** Please call Endocrine for any BG related issues **     Discharge Planning:   TBD. Please notify endocrinology prior to discharge.       Status post simultaneous kidney and pancreas transplant  Managed by primary  Optimize BG for surgical wound healing.       End stage renal failure on dialysis  Titrate insulin slowly to avoid hypoglycemia as the risk of hypoglycemia increases with decreased creatinine clearance.              Deacon Cervantes PA-C  Endocrinology  [facility]

## 2022-11-04 NOTE — ASSESSMENT & PLAN NOTE
BG goal: 140-180     Type 1 DM s/p kidney/pancreas transplant 11/3/2022. Insulin drip was able to be stopped after sx. She has not required insulin since then.  Will continue to monitor while pt on steroids and diet advanced.    -Continue Novolog Low dose correction with ISF 50 starting at 150    - POCT Glucose before meals and at bedtime   - Hypoglycemia protocol in place      ** Please notify Endocrine for any change and/or advance in diet**  ** Please call Endocrine for any BG related issues **     Discharge Planning:   TBD. Please notify endocrinology prior to discharge.

## 2022-11-04 NOTE — ASSESSMENT & PLAN NOTE
22yo F with PMHx of ESRD 2/2 HTN and DM1 now s/p kidney pancreas transplant.      Neuro/Psych:   -- Pain: Tylenol, oxycodone, morphine PRN             Cards:   -- HDS, no requirement for any drips  -- Continue home carvedilol, hydralazine, nifedipine plus PRNs      Pulm:   -- Goal O2 sat > 90%  -- NC O2 PRN  -- Wean as able      Renal:  -- Keep dc for strict I/O  -- Trend BUN/Cr. Cr 4.4 from 4.1  -- Daily BMP      FEN / GI:   -- Replace lytes as needed  -- Nutrition: NPO today, potentially trial CLD tomorrow  -- Anti-rejection meds: Thymoglobulin, diphenhydramine, methylpred, mycophenalte, tacro; Daily tacro levels  -- US Panc/Kidney on 11/4 WNL, repeat in 1 week  -- Amylase, Lipase daily, downtrending       ID:   -- Tm: Afebrile, WBC WNL  -- Abx/antifungals/antivirals: bactrim, diflucan, valganciclovir, nystatin suspension       Heme/Onc:   -- Hgb 7.1 from 8.3  -- Transfuse 1 unit pRBC  -- Daily CBC  -- CTM      Endo:   -- Gluc goal 140-180  -- SSI  -- Endocrine on board for recs       PPx:   Feeding: NPO  Analgesia/Sedation: Multimodals PRN / not indicated  Thromboembolic prevention: SQH  HOB >30: Yes  Stress Ulcer ppx: Famotidine   Glucose control: Critical care goal 140-180 g/dl, ISS     Lines/Drains/Airway: A line, SINAN drain x1      Dispo/Code Status/Palliative:   -- SICU / Full Code  -- Likely step down today

## 2022-11-04 NOTE — NURSING
UOP dropped off to 10ccs this hour, per MD Aguila admin 250ccs og 25% albumin, if UOP drops off again administer the other half, WCTM

## 2022-11-04 NOTE — PROGRESS NOTES
Update       met with patient and mother in room on SICU.  provided patient and mother with requested utility assistance resources.  assessed for needs, no needs reported at this time.      remains available,     Makayla Bess LMSW

## 2022-11-04 NOTE — SUBJECTIVE & OBJECTIVE
"Interval HPI:   Overnight events:No acute events overnight. Patient in room 67902/08961 A. Blood glucose stable. BG at and below goal on current insulin regimen (SSI ). Steroid use- Methylprednisolone  250 mg IV ordered for today.   2 Days Post-Op  Renal function- Abnormal - Cr 4.4   Vasopressors-  None     Diet NPO Except for: Sips with Medication     Eating:   NPO  Nausea: No  Hypoglycemia and intervention: No  Fever: No  TPN and/or TF: No      /63   Pulse 94   Temp 97.8 °F (36.6 °C)   Resp 12   Ht 5' 4" (1.626 m)   Wt 69.6 kg (153 lb 7 oz)   SpO2 99%   Breastfeeding No   BMI 26.34 kg/m²     Labs Reviewed and Include    Recent Labs   Lab 11/04/22 0418      CALCIUM 8.9   ALBUMIN 4.4      K 3.4*   CO2 21*      BUN 46*   CREATININE 4.4*     Lab Results   Component Value Date    WBC 9.86 11/04/2022    HGB 7.1 (L) 11/04/2022    HCT 21.6 (L) 11/04/2022    MCV 99 (H) 11/04/2022     (L) 11/04/2022     No results for input(s): TSH, FREET4 in the last 168 hours.  Lab Results   Component Value Date    HGBA1C 11.1 (H) 11/02/2022       Nutritional status:   Body mass index is 26.34 kg/m².  Lab Results   Component Value Date    ALBUMIN 4.4 11/04/2022    ALBUMIN 4.0 11/02/2022    ALBUMIN 3.4 (L) 09/14/2022     No results found for: PREALBUMIN    Estimated Creatinine Clearance: 18.4 mL/min (A) (based on SCr of 4.4 mg/dL (H)).    Accu-Checks  Recent Labs     11/03/22  0713 11/03/22  0756 11/03/22  0910 11/03/22  1011 11/03/22  1043 11/03/22  1211 11/03/22  1306 11/03/22  1408 11/03/22  1440 11/04/22  0739   POCTGLUCOSE 80 81 76 94 115* 119* 118* 117* 128* 105       Current Medications and/or Treatments Impacting Glycemic Control  Immunotherapy:    Immunosuppressants           Stop Route Frequency     antithymocyte globulin (rabbit) 100 mg, hydrocortisone sodium succinate (SOLU-CORTEF) 20 mg in sodium chloride 0.9% 500 mL (FOR PERIPHERAL LINE ADMINISTRATION ONLY)         11/06 0859 IV Daily "     mycophenolate mofetil 200 mg/mL suspension 1,000 mg         -- PER NG TUBE 2 times daily     tacrolimus (PROGRAF) 1 mg/mL oral syringe         -- PER NG TUBE 2 times daily          Steroids:   Hormones (From admission, onward)      Start     Stop Route Frequency Ordered    11/06/22 0900  methylPREDNISolone sodium succinate injection 20 mg         -- IV Daily 11/03/22 0436    11/05/22 0900  methylPREDNISolone sodium succinate injection 125 mg         -- IV Once 11/03/22 0436    11/04/22 0900  methylPREDNISolone sodium succinate injection 250 mg         -- IV Once 11/03/22 0436 11/04/22 0900  antithymocyte globulin (rabbit) 100 mg, hydrocortisone sodium succinate (SOLU-CORTEF) 20 mg in sodium chloride 0.9% 500 mL (FOR PERIPHERAL LINE ADMINISTRATION ONLY)         11/06 0859 IV Daily 11/03/22 0436          Pressors:    Autonomic Drugs (From admission, onward)      None          Hyperglycemia/Diabetes Medications:   Antihyperglycemics (From admission, onward)      Start     Stop Route Frequency Ordered    11/03/22 1306  insulin aspart U-100 pen 0-5 Units         -- SubQ Every 6 hours PRN 11/03/22 1207

## 2022-11-05 LAB
ALBUMIN SERPL BCP-MCNC: 4.3 G/DL (ref 3.5–5.2)
ALBUMIN SERPL BCP-MCNC: 4.3 G/DL (ref 3.5–5.2)
AMYLASE SERPL-CCNC: 106 U/L (ref 20–110)
ANION GAP SERPL CALC-SCNC: 17 MMOL/L (ref 8–16)
ANION GAP SERPL CALC-SCNC: 18 MMOL/L (ref 8–16)
BACTERIA #/AREA URNS AUTO: ABNORMAL /HPF
BASOPHILS # BLD AUTO: 0 K/UL (ref 0–0.2)
BASOPHILS # BLD AUTO: 0 K/UL (ref 0–0.2)
BASOPHILS NFR BLD: 0 % (ref 0–1.9)
BASOPHILS NFR BLD: 0 % (ref 0–1.9)
BILIRUB UR QL STRIP: NEGATIVE
BUN SERPL-MCNC: 51 MG/DL (ref 6–20)
BUN SERPL-MCNC: 54 MG/DL (ref 6–20)
CALCIUM SERPL-MCNC: 8.2 MG/DL (ref 8.7–10.5)
CALCIUM SERPL-MCNC: 8.5 MG/DL (ref 8.7–10.5)
CHLORIDE SERPL-SCNC: 101 MMOL/L (ref 95–110)
CHLORIDE SERPL-SCNC: 104 MMOL/L (ref 95–110)
CLARITY UR REFRACT.AUTO: ABNORMAL
CO2 SERPL-SCNC: 17 MMOL/L (ref 23–29)
CO2 SERPL-SCNC: 18 MMOL/L (ref 23–29)
COLOR UR AUTO: ABNORMAL
CREAT SERPL-MCNC: 4 MG/DL (ref 0.5–1.4)
CREAT SERPL-MCNC: 4 MG/DL (ref 0.5–1.4)
DIFFERENTIAL METHOD: ABNORMAL
DIFFERENTIAL METHOD: ABNORMAL
EOSINOPHIL # BLD AUTO: 0 K/UL (ref 0–0.5)
EOSINOPHIL # BLD AUTO: 0 K/UL (ref 0–0.5)
EOSINOPHIL NFR BLD: 0 % (ref 0–8)
EOSINOPHIL NFR BLD: 0 % (ref 0–8)
ERYTHROCYTE [DISTWIDTH] IN BLOOD BY AUTOMATED COUNT: 15.8 % (ref 11.5–14.5)
ERYTHROCYTE [DISTWIDTH] IN BLOOD BY AUTOMATED COUNT: 15.9 % (ref 11.5–14.5)
EST. GFR  (NO RACE VARIABLE): 15 ML/MIN/1.73 M^2
EST. GFR  (NO RACE VARIABLE): 15 ML/MIN/1.73 M^2
GLUCOSE SERPL-MCNC: 118 MG/DL (ref 70–110)
GLUCOSE SERPL-MCNC: 142 MG/DL (ref 70–110)
GLUCOSE UR QL STRIP: NEGATIVE
HCT VFR BLD AUTO: 24.9 % (ref 37–48.5)
HCT VFR BLD AUTO: 25.8 % (ref 37–48.5)
HGB BLD-MCNC: 8.3 G/DL (ref 12–16)
HGB BLD-MCNC: 8.7 G/DL (ref 12–16)
HGB UR QL STRIP: ABNORMAL
HYALINE CASTS UR QL AUTO: 0 /LPF
IMM GRANULOCYTES # BLD AUTO: 0.02 K/UL (ref 0–0.04)
IMM GRANULOCYTES # BLD AUTO: 0.04 K/UL (ref 0–0.04)
IMM GRANULOCYTES NFR BLD AUTO: 0.3 % (ref 0–0.5)
IMM GRANULOCYTES NFR BLD AUTO: 0.4 % (ref 0–0.5)
KETONES UR QL STRIP: NEGATIVE
LEUKOCYTE ESTERASE UR QL STRIP: ABNORMAL
LIPASE SERPL-CCNC: 42 U/L (ref 4–60)
LIPASE SERPL-CCNC: 47 U/L (ref 4–60)
LYMPHOCYTES # BLD AUTO: 0.3 K/UL (ref 1–4.8)
LYMPHOCYTES # BLD AUTO: 0.4 K/UL (ref 1–4.8)
LYMPHOCYTES NFR BLD: 3.9 % (ref 18–48)
LYMPHOCYTES NFR BLD: 4 % (ref 18–48)
MAGNESIUM SERPL-MCNC: 2 MG/DL (ref 1.6–2.6)
MCH RBC QN AUTO: 32.2 PG (ref 27–31)
MCH RBC QN AUTO: 32.5 PG (ref 27–31)
MCHC RBC AUTO-ENTMCNC: 33.3 G/DL (ref 32–36)
MCHC RBC AUTO-ENTMCNC: 33.7 G/DL (ref 32–36)
MCV RBC AUTO: 96 FL (ref 82–98)
MCV RBC AUTO: 97 FL (ref 82–98)
MICROSCOPIC COMMENT: ABNORMAL
MONOCYTES # BLD AUTO: 0.3 K/UL (ref 0.3–1)
MONOCYTES # BLD AUTO: 0.6 K/UL (ref 0.3–1)
MONOCYTES NFR BLD: 4.7 % (ref 4–15)
MONOCYTES NFR BLD: 6.3 % (ref 4–15)
NEUTROPHILS # BLD AUTO: 5.9 K/UL (ref 1.8–7.7)
NEUTROPHILS # BLD AUTO: 8.2 K/UL (ref 1.8–7.7)
NEUTROPHILS NFR BLD: 89.3 % (ref 38–73)
NEUTROPHILS NFR BLD: 91.1 % (ref 38–73)
NITRITE UR QL STRIP: NEGATIVE
NRBC BLD-RTO: 0 /100 WBC
NRBC BLD-RTO: 0 /100 WBC
PH UR STRIP: 6 [PH] (ref 5–8)
PHOSPHATE SERPL-MCNC: 6.7 MG/DL (ref 2.7–4.5)
PHOSPHATE SERPL-MCNC: 7.1 MG/DL (ref 2.7–4.5)
PHOSPHATE SERPL-MCNC: 7.1 MG/DL (ref 2.7–4.5)
PLATELET # BLD AUTO: 151 K/UL (ref 150–450)
PLATELET # BLD AUTO: 156 K/UL (ref 150–450)
PMV BLD AUTO: 12.4 FL (ref 9.2–12.9)
PMV BLD AUTO: 12.8 FL (ref 9.2–12.9)
POCT GLUCOSE: 106 MG/DL (ref 70–110)
POCT GLUCOSE: 119 MG/DL (ref 70–110)
POCT GLUCOSE: 151 MG/DL (ref 70–110)
POTASSIUM SERPL-SCNC: 3.4 MMOL/L (ref 3.5–5.1)
POTASSIUM SERPL-SCNC: 3.5 MMOL/L (ref 3.5–5.1)
PROT UR QL STRIP: ABNORMAL
RBC # BLD AUTO: 2.58 M/UL (ref 4–5.4)
RBC # BLD AUTO: 2.68 M/UL (ref 4–5.4)
RBC #/AREA URNS AUTO: >100 /HPF (ref 0–4)
SODIUM SERPL-SCNC: 136 MMOL/L (ref 136–145)
SODIUM SERPL-SCNC: 139 MMOL/L (ref 136–145)
SP GR UR STRIP: 1.02 (ref 1–1.03)
TACROLIMUS BLD-MCNC: 10.4 NG/ML (ref 5–15)
URN SPEC COLLECT METH UR: ABNORMAL
WBC # BLD AUTO: 6.43 K/UL (ref 3.9–12.7)
WBC # BLD AUTO: 9.2 K/UL (ref 3.9–12.7)
WBC #/AREA URNS AUTO: 0 /HPF (ref 0–5)

## 2022-11-05 PROCEDURE — 63600175 PHARM REV CODE 636 W HCPCS: Performed by: INTERNAL MEDICINE

## 2022-11-05 PROCEDURE — 80069 RENAL FUNCTION PANEL: CPT | Performed by: STUDENT IN AN ORGANIZED HEALTH CARE EDUCATION/TRAINING PROGRAM

## 2022-11-05 PROCEDURE — 82150 ASSAY OF AMYLASE: CPT | Performed by: STUDENT IN AN ORGANIZED HEALTH CARE EDUCATION/TRAINING PROGRAM

## 2022-11-05 PROCEDURE — 85025 COMPLETE CBC W/AUTO DIFF WBC: CPT | Performed by: STUDENT IN AN ORGANIZED HEALTH CARE EDUCATION/TRAINING PROGRAM

## 2022-11-05 PROCEDURE — 25000003 PHARM REV CODE 250: Performed by: STUDENT IN AN ORGANIZED HEALTH CARE EDUCATION/TRAINING PROGRAM

## 2022-11-05 PROCEDURE — 80197 ASSAY OF TACROLIMUS: CPT | Performed by: STUDENT IN AN ORGANIZED HEALTH CARE EDUCATION/TRAINING PROGRAM

## 2022-11-05 PROCEDURE — 63600175 PHARM REV CODE 636 W HCPCS: Performed by: NURSE PRACTITIONER

## 2022-11-05 PROCEDURE — 25000003 PHARM REV CODE 250: Performed by: NURSE PRACTITIONER

## 2022-11-05 PROCEDURE — 80069 RENAL FUNCTION PANEL: CPT | Mod: 91 | Performed by: NURSE PRACTITIONER

## 2022-11-05 PROCEDURE — 63600175 PHARM REV CODE 636 W HCPCS: Mod: JG | Performed by: STUDENT IN AN ORGANIZED HEALTH CARE EDUCATION/TRAINING PROGRAM

## 2022-11-05 PROCEDURE — 83690 ASSAY OF LIPASE: CPT | Mod: 91 | Performed by: NURSE PRACTITIONER

## 2022-11-05 PROCEDURE — 25000003 PHARM REV CODE 250: Performed by: TRANSPLANT SURGERY

## 2022-11-05 PROCEDURE — 20600001 HC STEP DOWN PRIVATE ROOM

## 2022-11-05 PROCEDURE — 83690 ASSAY OF LIPASE: CPT | Performed by: STUDENT IN AN ORGANIZED HEALTH CARE EDUCATION/TRAINING PROGRAM

## 2022-11-05 PROCEDURE — 85025 COMPLETE CBC W/AUTO DIFF WBC: CPT | Mod: 91 | Performed by: NURSE PRACTITIONER

## 2022-11-05 PROCEDURE — 83735 ASSAY OF MAGNESIUM: CPT | Performed by: PHYSICIAN ASSISTANT

## 2022-11-05 RX ORDER — ACETAMINOPHEN 325 MG/1
650 TABLET ORAL EVERY 6 HOURS
Status: DISCONTINUED | OUTPATIENT
Start: 2022-11-05 | End: 2022-11-09 | Stop reason: HOSPADM

## 2022-11-05 RX ORDER — NIFEDIPINE 30 MG/1
30 TABLET, EXTENDED RELEASE ORAL 2 TIMES DAILY
Status: DISCONTINUED | OUTPATIENT
Start: 2022-11-05 | End: 2022-11-05

## 2022-11-05 RX ORDER — FUROSEMIDE 10 MG/ML
120 INJECTION INTRAMUSCULAR; INTRAVENOUS ONCE
Status: COMPLETED | OUTPATIENT
Start: 2022-11-05 | End: 2022-11-05

## 2022-11-05 RX ORDER — TACROLIMUS 1 MG/1
1 CAPSULE ORAL 2 TIMES DAILY
Status: DISCONTINUED | OUTPATIENT
Start: 2022-11-05 | End: 2022-11-06

## 2022-11-05 RX ORDER — PREDNISONE 20 MG/1
20 TABLET ORAL DAILY
Status: DISCONTINUED | OUTPATIENT
Start: 2022-11-06 | End: 2022-11-09 | Stop reason: HOSPADM

## 2022-11-05 RX ORDER — FAMOTIDINE 20 MG/1
20 TABLET, FILM COATED ORAL DAILY
Status: DISCONTINUED | OUTPATIENT
Start: 2022-11-05 | End: 2022-11-09 | Stop reason: HOSPADM

## 2022-11-05 RX ORDER — SODIUM BICARBONATE 650 MG/1
650 TABLET ORAL 2 TIMES DAILY
Status: DISCONTINUED | OUTPATIENT
Start: 2022-11-05 | End: 2022-11-09

## 2022-11-05 RX ORDER — NIFEDIPINE 30 MG/1
30 TABLET, EXTENDED RELEASE ORAL 2 TIMES DAILY
Status: DISCONTINUED | OUTPATIENT
Start: 2022-11-05 | End: 2022-11-06

## 2022-11-05 RX ORDER — SEVELAMER CARBONATE 800 MG/1
800 TABLET, FILM COATED ORAL
Status: DISCONTINUED | OUTPATIENT
Start: 2022-11-05 | End: 2022-11-06

## 2022-11-05 RX ORDER — MYCOPHENOLATE MOFETIL 250 MG/1
1000 CAPSULE ORAL 2 TIMES DAILY
Status: DISCONTINUED | OUTPATIENT
Start: 2022-11-05 | End: 2022-11-09 | Stop reason: HOSPADM

## 2022-11-05 RX ORDER — FLUCONAZOLE 200 MG/1
200 TABLET ORAL DAILY
Status: COMPLETED | OUTPATIENT
Start: 2022-11-06 | End: 2022-11-09

## 2022-11-05 RX ORDER — DIPHENHYDRAMINE HCL 25 MG
25 CAPSULE ORAL ONCE
Status: COMPLETED | OUTPATIENT
Start: 2022-11-05 | End: 2022-11-05

## 2022-11-05 RX ADMIN — TACROLIMUS 2 MG: 1 CAPSULE ORAL at 08:11

## 2022-11-05 RX ADMIN — METHYLPREDNISOLONE SODIUM SUCCINATE 125 MG: 125 INJECTION, POWDER, FOR SOLUTION INTRAMUSCULAR; INTRAVENOUS at 02:11

## 2022-11-05 RX ADMIN — DIPHENHYDRAMINE HYDROCHLORIDE 25 MG: 25 CAPSULE ORAL at 03:11

## 2022-11-05 RX ADMIN — ANTI-THYMOCYTE GLOBULIN (RABBIT) 100 MG: 5 INJECTION, POWDER, LYOPHILIZED, FOR SOLUTION INTRAVENOUS at 04:11

## 2022-11-05 RX ADMIN — OXYCODONE 5 MG: 5 TABLET ORAL at 09:11

## 2022-11-05 RX ADMIN — SODIUM BICARBONATE 650 MG TABLET 650 MG: at 08:11

## 2022-11-05 RX ADMIN — FAMOTIDINE 20 MG: 20 TABLET ORAL at 08:11

## 2022-11-05 RX ADMIN — FLUCONAZOLE IN SODIUM CHLORIDE 200 MG: 2 INJECTION, SOLUTION INTRAVENOUS at 05:11

## 2022-11-05 RX ADMIN — HEPARIN SODIUM 5000 UNITS: 5000 INJECTION INTRAVENOUS; SUBCUTANEOUS at 08:11

## 2022-11-05 RX ADMIN — ACETAMINOPHEN 650 MG: 325 TABLET ORAL at 03:11

## 2022-11-05 RX ADMIN — TACROLIMUS 1 MG: 1 CAPSULE ORAL at 06:11

## 2022-11-05 RX ADMIN — MYCOPHENOLATE MOFETIL 1000 MG: 250 CAPSULE ORAL at 08:11

## 2022-11-05 RX ADMIN — OXYCODONE 5 MG: 5 TABLET ORAL at 02:11

## 2022-11-05 RX ADMIN — NIFEDIPINE 30 MG: 30 TABLET, FILM COATED, EXTENDED RELEASE ORAL at 08:11

## 2022-11-05 RX ADMIN — MUPIROCIN 1 G: 20 OINTMENT TOPICAL at 08:11

## 2022-11-05 RX ADMIN — FUROSEMIDE 120 MG: 10 INJECTION, SOLUTION INTRAMUSCULAR; INTRAVENOUS at 08:11

## 2022-11-05 RX ADMIN — HYDRALAZINE HYDROCHLORIDE 25 MG: 25 TABLET, FILM COATED ORAL at 05:11

## 2022-11-05 RX ADMIN — VENLAFAXINE HYDROCHLORIDE 37.5 MG: 37.5 CAPSULE, EXTENDED RELEASE ORAL at 08:11

## 2022-11-05 RX ADMIN — OXYCODONE 5 MG: 5 TABLET ORAL at 08:11

## 2022-11-05 RX ADMIN — SEVELAMER CARBONATE 800 MG: 800 TABLET, FILM COATED ORAL at 01:11

## 2022-11-05 RX ADMIN — ASPIRIN 81 MG CHEWABLE TABLET 81 MG: 81 TABLET CHEWABLE at 08:11

## 2022-11-05 RX ADMIN — MYCOPHENOLATE MOFETIL 1000 MG: 200 POWDER, FOR SUSPENSION ORAL at 08:11

## 2022-11-05 NOTE — ASSESSMENT & PLAN NOTE
-clearing creatinine and off insulin  - poor urine output, improving with clearance of creatinine  - one dose of 120 mg lasix to help fluid clearance  - added sodium bicarbonate 650 mg bid  - dc catheter discontinued  - diet advanced to regular diet

## 2022-11-05 NOTE — CARE UPDATE
BG goal: 140-180   Diet Adult Regular (IDDSI Level 7)  3 Days Post-Op      Type 1 DM s/p kidney/pancreas transplant 11/3/2022. Insulin drip was able to be stopped after sx. She has not required insulin since then.  Will continue to monitor while pt on steroids and diet advanced.     - Continue Novolog Low dose correction with ISF 50 starting at 150   - POCT Glucose before meals and at bedtime   - Hypoglycemia protocol in place      ** Please notify Endocrine for any change and/or advance in diet**  ** Please call Endocrine for any BG related issues **     Discharge Planning:   TBD. Please notify endocrinology prior to discharge.     Lab Results   Component Value Date    HGBA1C 11.1 (H) 11/02/2022

## 2022-11-05 NOTE — PROGRESS NOTES
Moncada catheter d/c'd per MD order. Patient instructed to try to void 2 hours after Moncada d/c'd. Hat placed in toilet for measurement. Verbalized understanding.    Patient assisted to chair with standby assistance.

## 2022-11-05 NOTE — PROGRESS NOTES
Pharmacist Renal Dose Adjustment Note    Isabela Read is a 27 y.o. female being treated with the medication famotidine    Patient Data:    Vital Signs (Most Recent):  Temp: 98.3 °F (36.8 °C) (11/05/22 0430)  Pulse: 95 (11/05/22 0430)  Resp: 18 (11/04/22 2000)  BP: 122/75 (11/05/22 0430)  SpO2: (!) 93 % (11/05/22 0430)   Vital Signs (72h Range):  Temp:  [97.5 °F (36.4 °C)-99.5 °F (37.5 °C)]   Pulse:  []   Resp:  [8-31]   BP: ()/()   SpO2:  [90 %-100 %]   Arterial Line BP: (104-279)/()      Recent Labs   Lab 11/03/22  1610 11/04/22  0418 11/05/22 0522   CREATININE 4.1* 4.4* 4.0*     Serum creatinine: 4 mg/dL (H) 11/05/22 0522  Estimated creatinine clearance: 20.6 mL/min (A)    Changed to medication:famotidine dose:20 mg frequency:daily    Pharmacist's Name: Gustavo Perez  Pharmacist's Extension: [phone]

## 2022-11-05 NOTE — SUBJECTIVE & OBJECTIVE
"  Subjective:   History of Present Illness:  Ms. Read is a 27 y.o. year old  female with ESRD secondary to diabetic nephropathy and HTN.  She has been on the wait list for a kidney transplant at [facility] since 2021. Patient is currently on hemodialysis started on 21. Patient is dialyzing on TTS schedule, last HD 22. Patient reports that she is tolerating dialysis well. She has a LUE AV fistula. She is dialyzing 4 hours per session. Dry weight 61 kg. She reports urinating usually once per day ("normal" amount per urination)    She now presents as a direct admit as primary candidate for a  donor kidney/pancreas transplant. OR times have not been established at this time. Pt is currently not NPO. Dr. Rodriges will be the primary surgeon. Thymo induction. Pre-op labs and imaging have been ordered and will be reviewed prior to transplant. Endocrine consulted to assist with blood sugar management.    Hospital Course:  Underwent combined kidney and pancreas transplant 2022 which was uneventful.  Send to ICU for recovery, extubated.  Immediately making urine.  Insulin discontinued within several hours of arrival to ICU.    Interval history : no acute event overnight. Stepped down . Received 1u RBC transfusion yesterday from Hgb 7.1. this am 8.3. 1010 cc 24 urine output. Moncada discontinued this morning. Tolerating clear liquid diet without nausea. Passing flatus. No bowel movement yet. Pain is well controlled. Repeat kidney US  due to poor urine output showed good perfusion, 3.2 cm sized fluid collection. Blood glucose level 120-140 without insulin requirement. Cr 4.0 today from 4.4 yesterday.      Past Medical, Surgical, Family, and Social History:   Unchanged from H&P.    Scheduled Meds:   acetaminophen  650 mg Oral Q6H    antithymocyte globulin (rabbit), hydrocortisone 20mg, with optional heparin 1000 units in NS 500ml (FOR PERIPHERAL LINE ADMINISTRATION ONLY)  1.5 mg/kg " (Adjusted) Intravenous Daily    aspirin  81 mg Oral Daily    carvediloL  25 mg Oral BID WM    diphenhydrAMINE  50 mg Intravenous Q24H    ergocalciferol  50,000 Units Oral Q7 Days    famotidine  20 mg Oral Daily    [START ON 11/6/2022] fluconazole  200 mg Oral Daily    heparin (porcine)  5,000 Units Subcutaneous TID    methylPREDNISolone sodium succinate injection  125 mg Intravenous Once    [START ON 11/6/2022] methylPREDNISolone sodium succinate injection  20 mg Intravenous Daily    mupirocin  1 g Nasal BID    mycophenolate mofetil  1,000 mg Per NG tube BID    NIFEdipine  30 mg Oral BID    [START ON 11/10/2022] nystatin  500,000 Units Mouth/Throat QID    sevelamer carbonate  800 mg Oral TID WM    sodium bicarbonate  650 mg Oral BID    [START ON 11/13/2022] sulfamethoxazole-trimethoprim 400-80mg  1 tablet Oral Daily AM    tacrolimus  1 mg Oral BID    [START ON 11/13/2022] valGANciclovir  450 mg Oral Daily    venlafaxine  37.5 mg Oral Daily     Continuous Infusions:      PRN Meds:sodium chloride 0.9%, dextrose 10%, dextrose 10%, diphenhydrAMINE, hydrALAZINE, labetalol, naloxone, oxyCODONE, oxyCODONE    Intake/Output - Last 3 Shifts         11/03 0700 11/04 0659 11/04 0700 11/05 0659 11/05 0700 11/06 0659    P.O. 530 500     I.V. (mL/kg) 2740.8 (39.4) 485.2 (6.7)     Blood  525     Other  0     IV Piggyback 515.5 88.2     Total Intake(mL/kg) 3786.4 (54.4) 1598.3 (22.1)     Urine (mL/kg/hr) 1655 (1) 1010 (0.6) 125 (0.5)    Emesis/NG output  0     Drains 380 620 60    Other  0     Stool  0     Blood  0     Total Output 2035 1630 185    Net +1751.4 -31.7 -185           Urine Occurrence  0 x     Stool Occurrence  0 x     Emesis Occurrence  0 x            Review of Systems   Constitutional: Negative.    HENT: Negative.     Respiratory:  Negative for shortness of breath.    Cardiovascular:  Negative for chest pain and leg swelling.   Gastrointestinal:  Positive for abdominal pain. Negative for nausea  "and vomiting.   Genitourinary:  Negative for difficulty urinating.   Skin:  Negative for rash.   Allergic/Immunologic: Positive for immunocompromised state.   Neurological: Negative.       Objective:     Vital Signs (Most Recent):  Temp: 98.2 °F (36.8 °C) (11/05/22 0807)  Pulse: 100 (11/05/22 0842)  Resp: 18 (11/05/22 0845)  BP: 112/62 (11/05/22 0807)  SpO2: (!) 90 % (11/05/22 0807)   Vital Signs (24h Range):  Temp:  [97.9 °F (36.6 °C)-98.5 °F (36.9 °C)] 98.2 °F (36.8 °C)  Pulse:  [] 100  Resp:  [16-23] 18  SpO2:  [90 %-100 %] 90 %  BP: (112-135)/(60-75) 112/62  Arterial Line BP: (144-164)/(77-94) 164/94     Weight: 72.2 kg (159 lb 2.8 oz)  Height: 5' 4" (162.6 cm)  Body mass index is 27.32 kg/m².    Physical Exam  Constitutional:       General: She is not in acute distress.  Cardiovascular:      Rate and Rhythm: Normal rate and regular rhythm.   Pulmonary:      Effort: Pulmonary effort is normal. No respiratory distress.      Breath sounds: Normal breath sounds.   Abdominal:      General: Bowel sounds are normal.      Palpations: Abdomen is soft. There is no mass.      Tenderness: There is abdominal tenderness.          Comments: Midline skin incision with intact skin staples  SINAN drain x1 in place with serosanguinous output   Musculoskeletal:         General: Normal range of motion.   Skin:     General: Skin is warm and dry.      Capillary Refill: Capillary refill takes less than 2 seconds.   Neurological:      General: No focal deficit present.      Mental Status: She is oriented to person, place, and time. Mental status is at baseline.   Psychiatric:         Mood and Affect: Mood normal.         Behavior: Behavior normal.         Thought Content: Thought content normal.       Laboratory:  Recent Labs   Lab 11/03/22  1610 11/03/22  1730 11/03/22  2311 11/04/22  0418 11/05/22  0522     --   --  144 139   K 3.5  --   --  3.4* 3.5     --   --  106 104   CO2 22*  --   --  21* 18*   BUN 39*  --   --  " 46* 51*   CREATININE 4.1*  --   --  4.4* 4.0*   CALCIUM 8.7  --   --  8.9 8.5*   PHOS  --    < > 4.8* 6.4*  6.3* 7.1*  7.1*    < > = values in this interval not displayed.        Diagnostic Results:  Renal allograft ultrasound 11/03/2022:  There is a transplant kidney identified in the right lower quadrant.  Renal allograft measures 10.6 cm in craniocaudal length.  No perinephric fluid or collection identified.  There is no renal mass or hydronephrosis.  Trace of peritransplant free fluid.  Ureteric stent is in place.    Doppler evaluation demonstrates a peak systolic velocity in the main renal artery of 323 cm/sec.  The main renal artery has a normal waveform without evidence of parvus tardus.  The main renal artery to iliac artery velocity ratio is not elevated measuring 0.95.  The main renal vein is patent.    Pancreas allograft ultrasound 11/03/2022:  Pancreatic allograft is homogeneous in echogenicity.  No peripancreatic fluid collections... Satisfactory grayscale and Doppler evaluation of the pancreas allograft.

## 2022-11-05 NOTE — PLAN OF CARE
Patient POD#2 following KP transplant. OOB to chair with standby assistance. Moncada d/c'd this AM but replaced this afternoon due to inability to void. STAT US done - results pending. IVF d/c'd. Diet advanced from clear liquids to regular - tolerating without c/o nausea/vomiting. BG WNL. Oxycodone given PRN for pain. Thymo #3 infusing - tolerating well. Mother at bedside.

## 2022-11-05 NOTE — PLAN OF CARE
Pt is AAOx4, afebrile, and VSS overnight. Pt on 0.5 L of O2 via NC w/ spO2 >94%. Pt has a midline incision, ALY w/ staples. Self meds stocked and ready for teaching this am. Pt has a dc w/ brown decreased UO overnight. PA notified, UC and UA obtained. Pt cont on D5 1/2 NS gtts @ 100cc/hr cont. Pt has RLQ SINAN drain putting out moderate ss drainage overnight. Pt pain controlled with PRN PO Oxycodone. Pt is in bed in the lowest position, wheels locked, and call light in reach.

## 2022-11-05 NOTE — PROGRESS NOTES
Patient attempted to void x2 - only able to void 20cc. Bladder scan done - 150cc to 220cc noted on scan. TODD Davis NP notified. Order received to replace Moncada catheter now.     Moncada replaced - 20cc urine returned. Bladder scan repeated. >200cc still observed on scan. Charge RN at bedside. TODD Davis NP to bedside to assess - Moncada irrigated with 20cc - 20cc urine returned to catheter.     Orders received for STAT labs and US.

## 2022-11-05 NOTE — PROGRESS NOTES
"[facility] - Transplant Stepdown  Kidney Transplant  Progress Note      Reason for Follow-up: Reassessment of Kidney, Pancreas Transplant - 11/3/2022  (#1) recipient and management of immunosuppression.    ORGAN:  RIGHT KIDNEY   Donor Type:  Donation after Brain Death   Donor CMV Status: Negative  Donor HBcAB:Negative  Donor HCV Status:Negative  Donor HBV KEE: Negative  Donor HCV KEE: Negative      Subjective:   History of Present Illness:  Ms. Read is a 27 y.o. year old  female with ESRD secondary to diabetic nephropathy and HTN.  She has been on the wait list for a kidney transplant at [facility] since 2021. Patient is currently on hemodialysis started on 21. Patient is dialyzing on TTS schedule, last HD 22. Patient reports that she is tolerating dialysis well. She has a LUE AV fistula. She is dialyzing 4 hours per session. Dry weight 61 kg. She reports urinating usually once per day ("normal" amount per urination)    She now presents as a direct admit as primary candidate for a  donor kidney/pancreas transplant. OR times have not been established at this time. Pt is currently not NPO. Dr. Rodriges will be the primary surgeon. Thymo induction. Pre-op labs and imaging have been ordered and will be reviewed prior to transplant. Endocrine consulted to assist with blood sugar management.    Hospital Course:  Underwent combined kidney and pancreas transplant 2022 which was uneventful.  Send to ICU for recovery, extubated.  Immediately making urine.  Insulin discontinued within several hours of arrival to ICU.    Interval history : no acute event overnight. Stepped down . Received 1u RBC transfusion yesterday from Hgb 7.1. this am 8.3. 1010 cc 24 urine output. Moncada discontinued this morning. Tolerating clear liquid diet without nausea. Passing flatus. No bowel movement yet. Pain is well controlled. Repeat kidney US  due to poor urine output showed good perfusion, 3.2 cm " sized fluid collection. Blood glucose level 120-140 without insulin requirement. Cr 4.0 today from 4.4 yesterday.      Past Medical, Surgical, Family, and Social History:   Unchanged from H&P.    Scheduled Meds:   acetaminophen  650 mg Oral Q6H    antithymocyte globulin (rabbit), hydrocortisone 20mg, with optional heparin 1000 units in NS 500ml (FOR PERIPHERAL LINE ADMINISTRATION ONLY)  1.5 mg/kg (Adjusted) Intravenous Daily    aspirin  81 mg Oral Daily    carvediloL  25 mg Oral BID WM    diphenhydrAMINE  50 mg Intravenous Q24H    ergocalciferol  50,000 Units Oral Q7 Days    famotidine  20 mg Oral Daily    [START ON 11/6/2022] fluconazole  200 mg Oral Daily    heparin (porcine)  5,000 Units Subcutaneous TID    methylPREDNISolone sodium succinate injection  125 mg Intravenous Once    [START ON 11/6/2022] methylPREDNISolone sodium succinate injection  20 mg Intravenous Daily    mupirocin  1 g Nasal BID    mycophenolate mofetil  1,000 mg Per NG tube BID    NIFEdipine  30 mg Oral BID    [START ON 11/10/2022] nystatin  500,000 Units Mouth/Throat QID    sevelamer carbonate  800 mg Oral TID WM    sodium bicarbonate  650 mg Oral BID    [START ON 11/13/2022] sulfamethoxazole-trimethoprim 400-80mg  1 tablet Oral Daily AM    tacrolimus  1 mg Oral BID    [START ON 11/13/2022] valGANciclovir  450 mg Oral Daily    venlafaxine  37.5 mg Oral Daily     Continuous Infusions:      PRN Meds:sodium chloride 0.9%, dextrose 10%, dextrose 10%, diphenhydrAMINE, hydrALAZINE, labetalol, naloxone, oxyCODONE, oxyCODONE    Intake/Output - Last 3 Shifts         11/03 0700 11/04 0659 11/04 0700 11/05 0659 11/05 0700 11/06 0659    P.O. 530 500     I.V. (mL/kg) 2740.8 (39.4) 485.2 (6.7)     Blood  525     Other  0     IV Piggyback 515.5 88.2     Total Intake(mL/kg) 3786.4 (54.4) 1598.3 (22.1)     Urine (mL/kg/hr) 1655 (1) 1010 (0.6) 125 (0.5)    Emesis/NG output  0     Drains 380 620 60    Other  0     Stool  0     Blood  0   "   Total Output 2035 1630 185    Net +1751.4 -31.7 -185           Urine Occurrence  0 x     Stool Occurrence  0 x     Emesis Occurrence  0 x            Review of Systems   Constitutional: Negative.    HENT: Negative.     Respiratory:  Negative for shortness of breath.    Cardiovascular:  Negative for chest pain and leg swelling.   Gastrointestinal:  Positive for abdominal pain. Negative for nausea and vomiting.   Genitourinary:  Negative for difficulty urinating.   Skin:  Negative for rash.   Allergic/Immunologic: Positive for immunocompromised state.   Neurological: Negative.       Objective:     Vital Signs (Most Recent):  Temp: 98.2 °F (36.8 °C) (11/05/22 0807)  Pulse: 100 (11/05/22 0842)  Resp: 18 (11/05/22 0845)  BP: 112/62 (11/05/22 0807)  SpO2: (!) 90 % (11/05/22 0807)   Vital Signs (24h Range):  Temp:  [97.9 °F (36.6 °C)-98.5 °F (36.9 °C)] 98.2 °F (36.8 °C)  Pulse:  [] 100  Resp:  [16-23] 18  SpO2:  [90 %-100 %] 90 %  BP: (112-135)/(60-75) 112/62  Arterial Line BP: (144-164)/(77-94) 164/94     Weight: 72.2 kg (159 lb 2.8 oz)  Height: 5' 4" (162.6 cm)  Body mass index is 27.32 kg/m².    Physical Exam  Constitutional:       General: She is not in acute distress.  Cardiovascular:      Rate and Rhythm: Normal rate and regular rhythm.   Pulmonary:      Effort: Pulmonary effort is normal. No respiratory distress.      Breath sounds: Normal breath sounds.   Abdominal:      General: Bowel sounds are normal.      Palpations: Abdomen is soft. There is no mass.      Tenderness: There is abdominal tenderness.          Comments: Midline skin incision with intact skin staples  SINAN drain x1 in place with serosanguinous output   Musculoskeletal:         General: Normal range of motion.   Skin:     General: Skin is warm and dry.      Capillary Refill: Capillary refill takes less than 2 seconds.   Neurological:      General: No focal deficit present.      Mental Status: She is oriented to person, place, and time. Mental " status is at baseline.   Psychiatric:         Mood and Affect: Mood normal.         Behavior: Behavior normal.         Thought Content: Thought content normal.       Laboratory:  Recent Labs   Lab 11/03/22  1610 11/03/22  1730 11/03/22  2311 11/04/22  0418 11/05/22  0522     --   --  144 139   K 3.5  --   --  3.4* 3.5     --   --  106 104   CO2 22*  --   --  21* 18*   BUN 39*  --   --  46* 51*   CREATININE 4.1*  --   --  4.4* 4.0*   CALCIUM 8.7  --   --  8.9 8.5*   PHOS  --    < > 4.8* 6.4*  6.3* 7.1*  7.1*    < > = values in this interval not displayed.        Diagnostic Results:  Renal allograft ultrasound 11/03/2022:  There is a transplant kidney identified in the right lower quadrant.  Renal allograft measures 10.6 cm in craniocaudal length.  No perinephric fluid or collection identified.  There is no renal mass or hydronephrosis.  Trace of peritransplant free fluid.  Ureteric stent is in place.    Doppler evaluation demonstrates a peak systolic velocity in the main renal artery of 323 cm/sec.  The main renal artery has a normal waveform without evidence of parvus tardus.  The main renal artery to iliac artery velocity ratio is not elevated measuring 0.95.  The main renal vein is patent.    Pancreas allograft ultrasound 11/03/2022:  Pancreatic allograft is homogeneous in echogenicity.  No peripancreatic fluid collections... Satisfactory grayscale and Doppler evaluation of the pancreas allograft.    Assessment/Plan:     * Type 1 diabetes mellitus with end-stage renal disease (ESRD)  - Now s/p kidney/pancreas transplant        Prophylactic immunotherapy  -Thymoglobulin, steroids, tacrolimus, mycophenolate initiated  -monitor for toxicities      At risk for opportunistic infections  -start as per protocol    Status post simultaneous kidney and pancreas transplant  -clearing creatinine and off insulin  - poor urine output, improving with clearance of creatinine  - one dose of 120 mg lasix to help fluid  clearance  - added sodium bicarbonate 650 mg bid  - dc catheter discontinued  - diet advanced to regular diet      Hypertension, essential  -monitor closely and watch for orthostasis.  Treat as needed.      End stage renal failure on dialysis  -creatinine improving, no need for renal replacement therapy      Discharge Planning:  Not ready for discharge    Amelia Marte MD  Kidney Transplant  [facility] - [facility]plant Stepdown

## 2022-11-06 LAB
ALBUMIN SERPL BCP-MCNC: 4.2 G/DL (ref 3.5–5.2)
AMYLASE SERPL-CCNC: 96 U/L (ref 20–110)
ANION GAP SERPL CALC-SCNC: 15 MMOL/L (ref 8–16)
BACTERIA UR CULT: NO GROWTH
BASOPHILS # BLD AUTO: 0 K/UL (ref 0–0.2)
BASOPHILS NFR BLD: 0 % (ref 0–1.9)
BUN SERPL-MCNC: 58 MG/DL (ref 6–20)
CALCIUM SERPL-MCNC: 8.6 MG/DL (ref 8.7–10.5)
CHLORIDE SERPL-SCNC: 101 MMOL/L (ref 95–110)
CO2 SERPL-SCNC: 20 MMOL/L (ref 23–29)
CREAT SERPL-MCNC: 3.3 MG/DL (ref 0.5–1.4)
DIFFERENTIAL METHOD: ABNORMAL
EOSINOPHIL # BLD AUTO: 0 K/UL (ref 0–0.5)
EOSINOPHIL NFR BLD: 0 % (ref 0–8)
ERYTHROCYTE [DISTWIDTH] IN BLOOD BY AUTOMATED COUNT: 15 % (ref 11.5–14.5)
EST. GFR  (NO RACE VARIABLE): 18.9 ML/MIN/1.73 M^2
GLUCOSE SERPL-MCNC: 123 MG/DL (ref 70–110)
HCT VFR BLD AUTO: 25.2 % (ref 37–48.5)
HGB BLD-MCNC: 8.5 G/DL (ref 12–16)
IMM GRANULOCYTES # BLD AUTO: 0.02 K/UL (ref 0–0.04)
IMM GRANULOCYTES NFR BLD AUTO: 0.4 % (ref 0–0.5)
LIPASE SERPL-CCNC: 31 U/L (ref 4–60)
LYMPHOCYTES # BLD AUTO: 0.4 K/UL (ref 1–4.8)
LYMPHOCYTES NFR BLD: 6.3 % (ref 18–48)
MAGNESIUM SERPL-MCNC: 2.1 MG/DL (ref 1.6–2.6)
MCH RBC QN AUTO: 32.2 PG (ref 27–31)
MCHC RBC AUTO-ENTMCNC: 33.7 G/DL (ref 32–36)
MCV RBC AUTO: 96 FL (ref 82–98)
MONOCYTES # BLD AUTO: 0.3 K/UL (ref 0.3–1)
MONOCYTES NFR BLD: 5.6 % (ref 4–15)
NEUTROPHILS # BLD AUTO: 5 K/UL (ref 1.8–7.7)
NEUTROPHILS NFR BLD: 87.7 % (ref 38–73)
NRBC BLD-RTO: 0 /100 WBC
PHOSPHATE SERPL-MCNC: 5.7 MG/DL (ref 2.7–4.5)
PHOSPHATE SERPL-MCNC: 5.7 MG/DL (ref 2.7–4.5)
PLATELET # BLD AUTO: 156 K/UL (ref 150–450)
PMV BLD AUTO: 12.9 FL (ref 9.2–12.9)
POCT GLUCOSE: 122 MG/DL (ref 70–110)
POCT GLUCOSE: 123 MG/DL (ref 70–110)
POCT GLUCOSE: 134 MG/DL (ref 70–110)
POTASSIUM SERPL-SCNC: 3.4 MMOL/L (ref 3.5–5.1)
RBC # BLD AUTO: 2.64 M/UL (ref 4–5.4)
SODIUM SERPL-SCNC: 136 MMOL/L (ref 136–145)
TACROLIMUS BLD-MCNC: 6.6 NG/ML (ref 5–15)
WBC # BLD AUTO: 5.69 K/UL (ref 3.9–12.7)

## 2022-11-06 PROCEDURE — 20600001 HC STEP DOWN PRIVATE ROOM

## 2022-11-06 PROCEDURE — 63600175 PHARM REV CODE 636 W HCPCS: Performed by: NURSE PRACTITIONER

## 2022-11-06 PROCEDURE — 83735 ASSAY OF MAGNESIUM: CPT | Performed by: PHYSICIAN ASSISTANT

## 2022-11-06 PROCEDURE — 25000003 PHARM REV CODE 250: Performed by: TRANSPLANT SURGERY

## 2022-11-06 PROCEDURE — 63600175 PHARM REV CODE 636 W HCPCS: Performed by: STUDENT IN AN ORGANIZED HEALTH CARE EDUCATION/TRAINING PROGRAM

## 2022-11-06 PROCEDURE — 85025 COMPLETE CBC W/AUTO DIFF WBC: CPT | Performed by: STUDENT IN AN ORGANIZED HEALTH CARE EDUCATION/TRAINING PROGRAM

## 2022-11-06 PROCEDURE — 99233 SBSQ HOSP IP/OBS HIGH 50: CPT | Mod: GC,,, | Performed by: INTERNAL MEDICINE

## 2022-11-06 PROCEDURE — 80069 RENAL FUNCTION PANEL: CPT | Performed by: STUDENT IN AN ORGANIZED HEALTH CARE EDUCATION/TRAINING PROGRAM

## 2022-11-06 PROCEDURE — 82150 ASSAY OF AMYLASE: CPT | Performed by: STUDENT IN AN ORGANIZED HEALTH CARE EDUCATION/TRAINING PROGRAM

## 2022-11-06 PROCEDURE — 83690 ASSAY OF LIPASE: CPT | Performed by: STUDENT IN AN ORGANIZED HEALTH CARE EDUCATION/TRAINING PROGRAM

## 2022-11-06 PROCEDURE — 80197 ASSAY OF TACROLIMUS: CPT | Performed by: STUDENT IN AN ORGANIZED HEALTH CARE EDUCATION/TRAINING PROGRAM

## 2022-11-06 PROCEDURE — 25000003 PHARM REV CODE 250: Performed by: STUDENT IN AN ORGANIZED HEALTH CARE EDUCATION/TRAINING PROGRAM

## 2022-11-06 PROCEDURE — 94761 N-INVAS EAR/PLS OXIMETRY MLT: CPT

## 2022-11-06 PROCEDURE — 99233 PR SUBSEQUENT HOSPITAL CARE,LEVL III: ICD-10-PCS | Mod: GC,,, | Performed by: INTERNAL MEDICINE

## 2022-11-06 PROCEDURE — 63600175 PHARM REV CODE 636 W HCPCS: Performed by: INTERNAL MEDICINE

## 2022-11-06 PROCEDURE — 25000003 PHARM REV CODE 250: Performed by: INTERNAL MEDICINE

## 2022-11-06 RX ORDER — TACROLIMUS 1 MG/1
1 CAPSULE ORAL ONCE
Status: COMPLETED | OUTPATIENT
Start: 2022-11-06 | End: 2022-11-06

## 2022-11-06 RX ORDER — POLYETHYLENE GLYCOL 3350 17 G/17G
17 POWDER, FOR SOLUTION ORAL 2 TIMES DAILY PRN
Status: DISCONTINUED | OUTPATIENT
Start: 2022-11-06 | End: 2022-11-07

## 2022-11-06 RX ORDER — CARVEDILOL 12.5 MG/1
12.5 TABLET ORAL 2 TIMES DAILY
Status: CANCELLED | OUTPATIENT
Start: 2022-11-06

## 2022-11-06 RX ORDER — POTASSIUM CHLORIDE 20 MEQ/1
20 TABLET, EXTENDED RELEASE ORAL ONCE
Status: COMPLETED | OUTPATIENT
Start: 2022-11-06 | End: 2022-11-06

## 2022-11-06 RX ORDER — CARVEDILOL 12.5 MG/1
12.5 TABLET ORAL 2 TIMES DAILY WITH MEALS
Status: DISCONTINUED | OUTPATIENT
Start: 2022-11-06 | End: 2022-11-08

## 2022-11-06 RX ORDER — DOCUSATE SODIUM 100 MG/1
100 CAPSULE, LIQUID FILLED ORAL 2 TIMES DAILY PRN
Status: DISCONTINUED | OUTPATIENT
Start: 2022-11-06 | End: 2022-11-07

## 2022-11-06 RX ORDER — NIFEDIPINE 30 MG/1
30 TABLET, EXTENDED RELEASE ORAL DAILY
Status: DISCONTINUED | OUTPATIENT
Start: 2022-11-07 | End: 2022-11-08

## 2022-11-06 RX ORDER — TACROLIMUS 1 MG/1
2 CAPSULE ORAL 2 TIMES DAILY
Status: DISCONTINUED | OUTPATIENT
Start: 2022-11-06 | End: 2022-11-07

## 2022-11-06 RX ADMIN — MUPIROCIN 1 G: 20 OINTMENT TOPICAL at 08:11

## 2022-11-06 RX ADMIN — POTASSIUM CHLORIDE 20 MEQ: 1500 TABLET, EXTENDED RELEASE ORAL at 12:11

## 2022-11-06 RX ADMIN — SODIUM BICARBONATE 650 MG TABLET 650 MG: at 08:11

## 2022-11-06 RX ADMIN — TACROLIMUS 1 MG: 1 CAPSULE ORAL at 08:11

## 2022-11-06 RX ADMIN — OXYCODONE 5 MG: 5 TABLET ORAL at 08:11

## 2022-11-06 RX ADMIN — PREDNISONE 20 MG: 20 TABLET ORAL at 08:11

## 2022-11-06 RX ADMIN — TACROLIMUS 1 MG: 1 CAPSULE ORAL at 12:11

## 2022-11-06 RX ADMIN — TACROLIMUS 2 MG: 1 CAPSULE ORAL at 05:11

## 2022-11-06 RX ADMIN — FLUCONAZOLE 200 MG: 200 TABLET ORAL at 08:11

## 2022-11-06 RX ADMIN — ACETAMINOPHEN 650 MG: 325 TABLET ORAL at 05:11

## 2022-11-06 RX ADMIN — ACETAMINOPHEN 650 MG: 325 TABLET ORAL at 12:11

## 2022-11-06 RX ADMIN — ASPIRIN 81 MG CHEWABLE TABLET 81 MG: 81 TABLET CHEWABLE at 08:11

## 2022-11-06 RX ADMIN — FAMOTIDINE 20 MG: 20 TABLET ORAL at 08:11

## 2022-11-06 RX ADMIN — MYCOPHENOLATE MOFETIL 1000 MG: 250 CAPSULE ORAL at 08:11

## 2022-11-06 RX ADMIN — HEPARIN SODIUM 5000 UNITS: 5000 INJECTION INTRAVENOUS; SUBCUTANEOUS at 08:11

## 2022-11-06 RX ADMIN — DOCUSATE SODIUM 100 MG: 100 CAPSULE ORAL at 05:11

## 2022-11-06 RX ADMIN — HEPARIN SODIUM 5000 UNITS: 5000 INJECTION INTRAVENOUS; SUBCUTANEOUS at 03:11

## 2022-11-06 RX ADMIN — OXYCODONE HYDROCHLORIDE 10 MG: 10 TABLET ORAL at 12:11

## 2022-11-06 RX ADMIN — VENLAFAXINE HYDROCHLORIDE 37.5 MG: 37.5 CAPSULE, EXTENDED RELEASE ORAL at 08:11

## 2022-11-06 RX ADMIN — POLYETHYLENE GLYCOL 3350 17 G: 17 POWDER, FOR SOLUTION ORAL at 05:11

## 2022-11-06 NOTE — PROGRESS NOTES
"[facility] - [facility]  Kidney Transplant  Progress Note      Reason for Follow-up: Reassessment of Kidney, Pancreas Transplant - 11/3/2022  (#1) recipient and management of immunosuppression.    ORGAN:  RIGHT KIDNEY   Donor Type:  Donation after Brain Death   Donor CMV Status: Negative  Donor HBcAB:Negative  Donor HCV Status:Negative  Donor HBV KEE: Negative  Donor HCV KEE: Negative      Subjective:   History of Present Illness:  Ms. Read is a 27 y.o. year old  female with ESRD secondary to diabetic nephropathy and HTN.  She has been on the wait list for a kidney transplant at [facility] since 2021. Patient is currently on hemodialysis started on 21. Patient is dialyzing on TTS schedule, last HD 22. Patient reports that she is tolerating dialysis well. She has a LUE AV fistula. She is dialyzing 4 hours per session. Dry weight 61 kg. She reports urinating usually once per day ("normal" amount per urination)    She now presents as a direct admit as primary candidate for a  donor kidney/pancreas transplant. OR times have not been established at this time. Pt is currently not NPO. Dr. Rodriges will be the primary surgeon. Thymo induction. Pre-op labs and imaging have been ordered and will be reviewed prior to transplant. Endocrine consulted to assist with blood sugar management.    Hospital Course:  Underwent combined kidney and pancreas transplant 2022 which was uneventful.  Send to ICU for recovery, extubated.  Immediately making urine.  Insulin discontinued within several hours of arrival to ICU.    Interval history : no acute event overnight. Moncada catheter was replaced in the evening due to inability to void. 24 h urine output 1280. Cr down trending, 3.3 today from 4.0 yesterday. Stable blood glucose level without insulin. Kidney US yesterday with good perfusion, no large fluid collection. Drain output last 24 hours 400 cc, thin serosanguinous. Tolerating oral " intake, passing flatus. Awaiting bowel movement.      Past Medical, Surgical, Family, and Social History:   Unchanged from H&P.    Scheduled Meds:   acetaminophen  650 mg Oral Q6H    aspirin  81 mg Oral Daily    carvediloL  25 mg Oral BID WM    ergocalciferol  50,000 Units Oral Q7 Days    famotidine  20 mg Oral Daily    fluconazole  200 mg Oral Daily    heparin (porcine)  5,000 Units Subcutaneous TID    mupirocin  1 g Nasal BID    mycophenolate  1,000 mg Oral BID    NIFEdipine  30 mg Oral BID    [START ON 11/10/2022] nystatin  500,000 Units Mouth/Throat QID    potassium chloride  20 mEq Oral Once    predniSONE  20 mg Oral Daily    sodium bicarbonate  650 mg Oral BID    [START ON 11/13/2022] sulfamethoxazole-trimethoprim 400-80mg  1 tablet Oral Daily AM    tacrolimus  1 mg Oral BID    [START ON 11/13/2022] valGANciclovir  450 mg Oral Daily    venlafaxine  37.5 mg Oral Daily     Continuous Infusions:      PRN Meds:sodium chloride 0.9%, dextrose 10%, dextrose 10%, hydrALAZINE, labetalol, naloxone, oxyCODONE, oxyCODONE    Intake/Output - Last 3 Shifts         11/04 0700  11/05 0659 11/05 0700 11/06 0659 11/06 0700  11/07 0659    P.O. 500 1900     I.V. (mL/kg) 485.2 (6.7) 0 (0)     Blood 525      Other 0 0     IV Piggyback 88.2      Total Intake(mL/kg) 1598.3 (22.1) 1900 (26.1)     Urine (mL/kg/hr) 1010 (0.6) 1280 (0.7)     Emesis/NG output 0 0     Drains 620 400     Other 0 0     Stool 0 0     Blood 0 0     Total Output 1630 1680     Net -31.7 +220            Urine Occurrence 0 x 0 x     Stool Occurrence 0 x 0 x     Emesis Occurrence 0 x 0 x            Review of Systems   Constitutional: Negative.    HENT: Negative.     Respiratory:  Negative for shortness of breath.    Cardiovascular:  Negative for chest pain and leg swelling.   Gastrointestinal:  Positive for abdominal pain. Negative for nausea and vomiting.   Genitourinary:  Negative for difficulty urinating.   Skin:  Negative for rash.  "  Allergic/Immunologic: Positive for immunocompromised state.   Neurological: Negative.       Objective:     Vital Signs (Most Recent):  Temp: 98.1 °F (36.7 °C) (11/06/22 0512)  Pulse: 98 (11/06/22 0512)  Resp: 18 (11/06/22 0512)  BP: 123/70 (11/06/22 0512)  SpO2: 96 % (11/06/22 0020)   Vital Signs (24h Range):  Temp:  [97.1 °F (36.2 °C)-98.7 °F (37.1 °C)] 98.1 °F (36.7 °C)  Pulse:  [] 98  Resp:  [14-20] 18  SpO2:  [90 %-99 %] 96 %  BP: (103-142)/(58-82) 123/70     Weight: 72.8 kg (160 lb 7.9 oz)  Height: 5' 4" (162.6 cm)  Body mass index is 27.55 kg/m².    Physical Exam  Constitutional:       General: She is not in acute distress.  Cardiovascular:      Rate and Rhythm: Normal rate and regular rhythm.   Pulmonary:      Effort: Pulmonary effort is normal. No respiratory distress.      Breath sounds: Normal breath sounds.   Abdominal:      General: Bowel sounds are normal.      Palpations: Abdomen is soft. There is no mass.      Tenderness: There is abdominal tenderness.          Comments: Midline skin incision with intact skin staples  SINAN drain x1 in place with thin serosanguinous output   Musculoskeletal:         General: Normal range of motion.   Skin:     General: Skin is warm and dry.      Capillary Refill: Capillary refill takes less than 2 seconds.   Neurological:      General: No focal deficit present.      Mental Status: She is oriented to person, place, and time. Mental status is at baseline.   Psychiatric:         Mood and Affect: Mood normal.         Behavior: Behavior normal.         Thought Content: Thought content normal.       Laboratory:  Recent Labs   Lab 11/05/22 0522 11/05/22  1636 11/06/22  0532    136 136   K 3.5 3.4* 3.4*    101 101   CO2 18* 17* 20*   BUN 51* 54* 58*   CREATININE 4.0* 4.0* 3.3*   CALCIUM 8.5* 8.2* 8.6*   PHOS 7.1*  7.1* 6.7* 5.7*  5.7*      Diagnostic Results:  Renal allograft ultrasound 11/03/2022:  There is a transplant kidney identified in the right " lower quadrant.  Renal allograft measures 10.6 cm in craniocaudal length.  No perinephric fluid or collection identified.  There is no renal mass or hydronephrosis.  Trace of peritransplant free fluid.  Ureteric stent is in place.    Doppler evaluation demonstrates a peak systolic velocity in the main renal artery of 323 cm/sec.  The main renal artery has a normal waveform without evidence of parvus tardus.  The main renal artery to iliac artery velocity ratio is not elevated measuring 0.95.  The main renal vein is patent.    Pancreas allograft ultrasound 11/03/2022:  Pancreatic allograft is homogeneous in echogenicity.  No peripancreatic fluid collections... Satisfactory grayscale and Doppler evaluation of the pancreas allograft.    Assessment/Plan:     * Type 1 diabetes mellitus with end-stage renal disease (ESRD)  - Now s/p kidney/pancreas transplant        Prophylactic immunotherapy  -Thymoglobulin, steroids, tacrolimus, mycophenolate initiated  -monitor for toxicities      At risk for opportunistic infections  -start as per protocol    Status post simultaneous kidney and pancreas transplant  - functioning kidney and pancreas graft  - dc catheter replaced 11/5 due to inability to void. Will try removal and voiding trial tomorrow am  - encourage po hydration  - encourage ambulation      Hypertension, essential  -monitor closely and watch for orthostasis.  Treat as needed.      End stage renal failure on dialysis  -creatinine improving, no need for renal replacement therapy      Discharge Planning:  Not ready for discharge    Amelia Marte MD  Kidney Transplant  [facility]

## 2022-11-06 NOTE — PLAN OF CARE
Pt is AAOx4, afebrile, and VSS overnight. Pt spO2 >94% on RA overnight. Pt has a midline incision, ALY w/ staples. Self meds stocked and explained to pt and family member overnight. Pt has a dc w/ cloudy , yellow decreased UO overnight. Pt has RLQ SINAN drain putting out moderate ss drainage overnight. Pt pain controlled with PRN PO Oxycodone. Pt is in bed in the lowest position, wheels locked, and call light in reach.

## 2022-11-06 NOTE — CARE UPDATE
BG goal: 140-180   Diet Adult Regular (IDDSI Level 7)  4 Days Post-Op      Type 1 DM s/p kidney/pancreas transplant 11/3/2022. Insulin drip was able to be stopped after sx. She has not required insulin since then.  Will continue to monitor while pt on steroids and diet advanced.     - Continue Novolog Low dose correction with ISF 50 starting at 150   - POCT Glucose before meals and at bedtime   - Hypoglycemia protocol in place      ** Please notify Endocrine for any change and/or advance in diet**  ** Please call Endocrine for any BG related issues **     Discharge Planning:   TBD. Please notify endocrinology prior to discharge.     Lab Results   Component Value Date    HGBA1C 11.1 (H) 11/02/2022

## 2022-11-06 NOTE — PLAN OF CARE
"VSS  No signs of evident distress  Pain medication admin as per MAR  Midline incision with staples.  Painted with betadine as per protocol.  Indwelling urinary catheter with cloudy yellow urine.    No BM this shift.  Orders obtained for PRN colace and laxative.   Mother at bedside.  Attentive to patient needs  RLQ SINAN with SS drainage.    Self meds explained.  Pt. To "pull" pm medications.  Right upper arm Midline dressing CDI  Left fistula +/+  Bed in lowest locked position.  Call light within reach.   Instructed to call for assistance.  Pt. Expressed understanding.  Educated on plan of care.  "

## 2022-11-06 NOTE — SUBJECTIVE & OBJECTIVE
"  Subjective:   History of Present Illness:  Ms. Read is a 27 y.o. year old  female with ESRD secondary to diabetic nephropathy and HTN.  She has been on the wait list for a kidney transplant at [facility] since 2021. Patient is currently on hemodialysis started on 21. Patient is dialyzing on TTS schedule, last HD 22. Patient reports that she is tolerating dialysis well. She has a LUE AV fistula. She is dialyzing 4 hours per session. Dry weight 61 kg. She reports urinating usually once per day ("normal" amount per urination)    She now presents as a direct admit as primary candidate for a  donor kidney/pancreas transplant. OR times have not been established at this time. Pt is currently not NPO. Dr. Rodriges will be the primary surgeon. Thymo induction. Pre-op labs and imaging have been ordered and will be reviewed prior to transplant. Endocrine consulted to assist with blood sugar management.    Hospital Course:  Underwent combined kidney and pancreas transplant 2022 which was uneventful.  Send to ICU for recovery, extubated.  Immediately making urine.  Insulin discontinued within several hours of arrival to ICU.    Interval history : no acute event overnight. Moncada catheter was replaced in the evening due to inability to void. 24 h urine output 1280. Cr down trending, 3.3 today from 4.0 yesterday. Stable blood glucose level without insulin. Kidney US yesterday with good perfusion, no large fluid collection. Drain output last 24 hours 400 cc, thin serosanguinous. Tolerating oral intake, passing flatus. Awaiting bowel movement.      Past Medical, Surgical, Family, and Social History:   Unchanged from H&P.    Scheduled Meds:   acetaminophen  650 mg Oral Q6H    aspirin  81 mg Oral Daily    carvediloL  25 mg Oral BID WM    ergocalciferol  50,000 Units Oral Q7 Days    famotidine  20 mg Oral Daily    fluconazole  200 mg Oral Daily    heparin (porcine)  5,000 Units Subcutaneous TID "    mupirocin  1 g Nasal BID    mycophenolate  1,000 mg Oral BID    NIFEdipine  30 mg Oral BID    [START ON 11/10/2022] nystatin  500,000 Units Mouth/Throat QID    potassium chloride  20 mEq Oral Once    predniSONE  20 mg Oral Daily    sodium bicarbonate  650 mg Oral BID    [START ON 11/13/2022] sulfamethoxazole-trimethoprim 400-80mg  1 tablet Oral Daily AM    tacrolimus  1 mg Oral BID    [START ON 11/13/2022] valGANciclovir  450 mg Oral Daily    venlafaxine  37.5 mg Oral Daily     Continuous Infusions:      PRN Meds:sodium chloride 0.9%, dextrose 10%, dextrose 10%, hydrALAZINE, labetalol, naloxone, oxyCODONE, oxyCODONE    Intake/Output - Last 3 Shifts         11/04 0700 11/05 0659 11/05 0700 11/06 0659 11/06 0700 11/07 0659    P.O. 500 1900     I.V. (mL/kg) 485.2 (6.7) 0 (0)     Blood 525      Other 0 0     IV Piggyback 88.2      Total Intake(mL/kg) 1598.3 (22.1) 1900 (26.1)     Urine (mL/kg/hr) 1010 (0.6) 1280 (0.7)     Emesis/NG output 0 0     Drains 620 400     Other 0 0     Stool 0 0     Blood 0 0     Total Output 1630 1680     Net -31.7 +220            Urine Occurrence 0 x 0 x     Stool Occurrence 0 x 0 x     Emesis Occurrence 0 x 0 x            Review of Systems   Constitutional: Negative.    HENT: Negative.     Respiratory:  Negative for shortness of breath.    Cardiovascular:  Negative for chest pain and leg swelling.   Gastrointestinal:  Positive for abdominal pain. Negative for nausea and vomiting.   Genitourinary:  Negative for difficulty urinating.   Skin:  Negative for rash.   Allergic/Immunologic: Positive for immunocompromised state.   Neurological: Negative.       Objective:     Vital Signs (Most Recent):  Temp: 98.1 °F (36.7 °C) (11/06/22 0512)  Pulse: 98 (11/06/22 0512)  Resp: 18 (11/06/22 0512)  BP: 123/70 (11/06/22 0512)  SpO2: 96 % (11/06/22 0020)   Vital Signs (24h Range):  Temp:  [97.1 °F (36.2 °C)-98.7 °F (37.1 °C)] 98.1 °F (36.7 °C)  Pulse:  [] 98  Resp:  [14-20] 18  SpO2:  [90 %-99  "%] 96 %  BP: (103-142)/(58-82) 123/70     Weight: 72.8 kg (160 lb 7.9 oz)  Height: 5' 4" (162.6 cm)  Body mass index is 27.55 kg/m².    Physical Exam  Constitutional:       General: She is not in acute distress.  Cardiovascular:      Rate and Rhythm: Normal rate and regular rhythm.   Pulmonary:      Effort: Pulmonary effort is normal. No respiratory distress.      Breath sounds: Normal breath sounds.   Abdominal:      General: Bowel sounds are normal.      Palpations: Abdomen is soft. There is no mass.      Tenderness: There is abdominal tenderness.          Comments: Midline skin incision with intact skin staples  SINAN drain x1 in place with thin serosanguinous output   Musculoskeletal:         General: Normal range of motion.   Skin:     General: Skin is warm and dry.      Capillary Refill: Capillary refill takes less than 2 seconds.   Neurological:      General: No focal deficit present.      Mental Status: She is oriented to person, place, and time. Mental status is at baseline.   Psychiatric:         Mood and Affect: Mood normal.         Behavior: Behavior normal.         Thought Content: Thought content normal.       Laboratory:  Recent Labs   Lab 11/05/22  0522 11/05/22  1636 11/06/22  0532    136 136   K 3.5 3.4* 3.4*    101 101   CO2 18* 17* 20*   BUN 51* 54* 58*   CREATININE 4.0* 4.0* 3.3*   CALCIUM 8.5* 8.2* 8.6*   PHOS 7.1*  7.1* 6.7* 5.7*  5.7*      Diagnostic Results:  Renal allograft ultrasound 11/03/2022:  There is a transplant kidney identified in the right lower quadrant.  Renal allograft measures 10.6 cm in craniocaudal length.  No perinephric fluid or collection identified.  There is no renal mass or hydronephrosis.  Trace of peritransplant free fluid.  Ureteric stent is in place.    Doppler evaluation demonstrates a peak systolic velocity in the main renal artery of 323 cm/sec.  The main renal artery has a normal waveform without evidence of parvus tardus.  The main renal artery to " iliac artery velocity ratio is not elevated measuring 0.95.  The main renal vein is patent.    Pancreas allograft ultrasound 11/03/2022:  Pancreatic allograft is homogeneous in echogenicity.  No peripancreatic fluid collections... Satisfactory grayscale and Doppler evaluation of the pancreas allograft.

## 2022-11-06 NOTE — ASSESSMENT & PLAN NOTE
- functioning kidney and pancreas graft  - dc catheter replaced 11/5 due to inability to void. Will try removal and voiding trial tomorrow am  - encourage po hydration  - encourage ambulation

## 2022-11-07 PROBLEM — D63.8 ANEMIA OF CHRONIC DISEASE: Status: ACTIVE | Noted: 2022-11-07

## 2022-11-07 PROBLEM — D62 ACUTE BLOOD LOSS ANEMIA: Status: ACTIVE | Noted: 2022-11-07

## 2022-11-07 PROBLEM — Z79.60 LONG-TERM USE OF IMMUNOSUPPRESSANT MEDICATION: Status: ACTIVE | Noted: 2022-11-07

## 2022-11-07 PROBLEM — R33.9 URINARY RETENTION: Status: ACTIVE | Noted: 2022-11-07

## 2022-11-07 LAB
ALBUMIN SERPL BCP-MCNC: 3.9 G/DL (ref 3.5–5.2)
AMYLASE SERPL-CCNC: 125 U/L (ref 20–110)
AMYLASE, BODY FLUID: 204 U/L
ANION GAP SERPL CALC-SCNC: 9 MMOL/L (ref 8–16)
BASOPHILS # BLD AUTO: 0.01 K/UL (ref 0–0.2)
BASOPHILS NFR BLD: 0.1 % (ref 0–1.9)
BODY FLUID SOURCE AMYLASE: NORMAL
BODY FLUID SOURCE, CREATININE: NORMAL
BUN SERPL-MCNC: 48 MG/DL (ref 6–20)
CALCIUM SERPL-MCNC: 9 MG/DL (ref 8.7–10.5)
CHLORIDE SERPL-SCNC: 106 MMOL/L (ref 95–110)
CO2 SERPL-SCNC: 23 MMOL/L (ref 23–29)
CREAT FLD-MCNC: 1.5 MG/DL
CREAT SERPL-MCNC: 1.7 MG/DL (ref 0.5–1.4)
DIFFERENTIAL METHOD: ABNORMAL
EOSINOPHIL # BLD AUTO: 0 K/UL (ref 0–0.5)
EOSINOPHIL NFR BLD: 0 % (ref 0–8)
ERYTHROCYTE [DISTWIDTH] IN BLOOD BY AUTOMATED COUNT: 14.8 % (ref 11.5–14.5)
EST. GFR  (NO RACE VARIABLE): 41.9 ML/MIN/1.73 M^2
GLUCOSE SERPL-MCNC: 108 MG/DL (ref 70–110)
HCT VFR BLD AUTO: 24.7 % (ref 37–48.5)
HGB BLD-MCNC: 8.2 G/DL (ref 12–16)
IMM GRANULOCYTES # BLD AUTO: 0.05 K/UL (ref 0–0.04)
IMM GRANULOCYTES NFR BLD AUTO: 0.6 % (ref 0–0.5)
LIPASE SERPL-CCNC: 56 U/L (ref 4–60)
LYMPHOCYTES # BLD AUTO: 0.6 K/UL (ref 1–4.8)
LYMPHOCYTES NFR BLD: 8.1 % (ref 18–48)
MAGNESIUM SERPL-MCNC: 2.2 MG/DL (ref 1.6–2.6)
MCH RBC QN AUTO: 31.7 PG (ref 27–31)
MCHC RBC AUTO-ENTMCNC: 33.2 G/DL (ref 32–36)
MCV RBC AUTO: 95 FL (ref 82–98)
MONOCYTES # BLD AUTO: 0.6 K/UL (ref 0.3–1)
MONOCYTES NFR BLD: 7.2 % (ref 4–15)
NEUTROPHILS # BLD AUTO: 6.5 K/UL (ref 1.8–7.7)
NEUTROPHILS NFR BLD: 84 % (ref 38–73)
NRBC BLD-RTO: 0 /100 WBC
PHOSPHATE SERPL-MCNC: 2.7 MG/DL (ref 2.7–4.5)
PHOSPHATE SERPL-MCNC: 2.7 MG/DL (ref 2.7–4.5)
PLATELET # BLD AUTO: 177 K/UL (ref 150–450)
PMV BLD AUTO: 12.9 FL (ref 9.2–12.9)
POCT GLUCOSE: 125 MG/DL (ref 70–110)
POCT GLUCOSE: 89 MG/DL (ref 70–110)
POTASSIUM SERPL-SCNC: 3.5 MMOL/L (ref 3.5–5.1)
RBC # BLD AUTO: 2.59 M/UL (ref 4–5.4)
SODIUM SERPL-SCNC: 138 MMOL/L (ref 136–145)
TACROLIMUS BLD-MCNC: 5.6 NG/ML (ref 5–15)
WBC # BLD AUTO: 7.79 K/UL (ref 3.9–12.7)

## 2022-11-07 PROCEDURE — 63600175 PHARM REV CODE 636 W HCPCS: Performed by: NURSE PRACTITIONER

## 2022-11-07 PROCEDURE — 82570 ASSAY OF URINE CREATININE: CPT | Performed by: NURSE PRACTITIONER

## 2022-11-07 PROCEDURE — 85025 COMPLETE CBC W/AUTO DIFF WBC: CPT | Performed by: STUDENT IN AN ORGANIZED HEALTH CARE EDUCATION/TRAINING PROGRAM

## 2022-11-07 PROCEDURE — 99233 SBSQ HOSP IP/OBS HIGH 50: CPT | Mod: 24,,, | Performed by: SURGERY

## 2022-11-07 PROCEDURE — 25000003 PHARM REV CODE 250: Performed by: STUDENT IN AN ORGANIZED HEALTH CARE EDUCATION/TRAINING PROGRAM

## 2022-11-07 PROCEDURE — 99233 PR SUBSEQUENT HOSPITAL CARE,LEVL III: ICD-10-PCS | Mod: 24,,, | Performed by: SURGERY

## 2022-11-07 PROCEDURE — 25000003 PHARM REV CODE 250: Performed by: NURSE PRACTITIONER

## 2022-11-07 PROCEDURE — 20600001 HC STEP DOWN PRIVATE ROOM

## 2022-11-07 PROCEDURE — 83690 ASSAY OF LIPASE: CPT | Performed by: STUDENT IN AN ORGANIZED HEALTH CARE EDUCATION/TRAINING PROGRAM

## 2022-11-07 PROCEDURE — 25000003 PHARM REV CODE 250: Performed by: INTERNAL MEDICINE

## 2022-11-07 PROCEDURE — 80069 RENAL FUNCTION PANEL: CPT | Performed by: STUDENT IN AN ORGANIZED HEALTH CARE EDUCATION/TRAINING PROGRAM

## 2022-11-07 PROCEDURE — 80197 ASSAY OF TACROLIMUS: CPT | Performed by: STUDENT IN AN ORGANIZED HEALTH CARE EDUCATION/TRAINING PROGRAM

## 2022-11-07 PROCEDURE — 63600175 PHARM REV CODE 636 W HCPCS: Performed by: STUDENT IN AN ORGANIZED HEALTH CARE EDUCATION/TRAINING PROGRAM

## 2022-11-07 PROCEDURE — 82150 ASSAY OF AMYLASE: CPT | Mod: 91 | Performed by: NURSE PRACTITIONER

## 2022-11-07 PROCEDURE — 82150 ASSAY OF AMYLASE: CPT | Performed by: STUDENT IN AN ORGANIZED HEALTH CARE EDUCATION/TRAINING PROGRAM

## 2022-11-07 PROCEDURE — 25000003 PHARM REV CODE 250: Performed by: TRANSPLANT SURGERY

## 2022-11-07 PROCEDURE — 83735 ASSAY OF MAGNESIUM: CPT | Performed by: PHYSICIAN ASSISTANT

## 2022-11-07 RX ORDER — TACROLIMUS 1 MG/1
2 CAPSULE ORAL ONCE
Status: COMPLETED | OUTPATIENT
Start: 2022-11-07 | End: 2022-11-07

## 2022-11-07 RX ORDER — BISACODYL 5 MG
10 TABLET, DELAYED RELEASE (ENTERIC COATED) ORAL NIGHTLY
Status: DISCONTINUED | OUTPATIENT
Start: 2022-11-07 | End: 2022-11-08

## 2022-11-07 RX ORDER — TACROLIMUS 1 MG/1
4 CAPSULE ORAL 2 TIMES DAILY
Status: DISCONTINUED | OUTPATIENT
Start: 2022-11-07 | End: 2022-11-08

## 2022-11-07 RX ORDER — DOCUSATE SODIUM 100 MG/1
100 CAPSULE, LIQUID FILLED ORAL 3 TIMES DAILY
Status: DISCONTINUED | OUTPATIENT
Start: 2022-11-07 | End: 2022-11-08

## 2022-11-07 RX ORDER — POTASSIUM CHLORIDE 20 MEQ/1
20 TABLET, EXTENDED RELEASE ORAL ONCE
Status: COMPLETED | OUTPATIENT
Start: 2022-11-07 | End: 2022-11-07

## 2022-11-07 RX ORDER — TALC
6 POWDER (GRAM) TOPICAL NIGHTLY PRN
Status: DISCONTINUED | OUTPATIENT
Start: 2022-11-07 | End: 2022-11-09 | Stop reason: HOSPADM

## 2022-11-07 RX ORDER — POLYETHYLENE GLYCOL 3350 17 G/17G
17 POWDER, FOR SOLUTION ORAL DAILY
Status: DISCONTINUED | OUTPATIENT
Start: 2022-11-07 | End: 2022-11-08

## 2022-11-07 RX ADMIN — HYDRALAZINE HYDROCHLORIDE 10 MG: 20 INJECTION, SOLUTION INTRAMUSCULAR; INTRAVENOUS at 08:11

## 2022-11-07 RX ADMIN — POLYETHYLENE GLYCOL 3350 17 G: 17 POWDER, FOR SOLUTION ORAL at 10:11

## 2022-11-07 RX ADMIN — MYCOPHENOLATE MOFETIL 1000 MG: 250 CAPSULE ORAL at 08:11

## 2022-11-07 RX ADMIN — CARVEDILOL 12.5 MG: 12.5 TABLET, FILM COATED ORAL at 10:11

## 2022-11-07 RX ADMIN — HEPARIN SODIUM 5000 UNITS: 5000 INJECTION INTRAVENOUS; SUBCUTANEOUS at 10:11

## 2022-11-07 RX ADMIN — VENLAFAXINE HYDROCHLORIDE 37.5 MG: 37.5 CAPSULE, EXTENDED RELEASE ORAL at 10:11

## 2022-11-07 RX ADMIN — TACROLIMUS 2 MG: 1 CAPSULE ORAL at 10:11

## 2022-11-07 RX ADMIN — MUPIROCIN 1 G: 20 OINTMENT TOPICAL at 10:11

## 2022-11-07 RX ADMIN — MYCOPHENOLATE MOFETIL 1000 MG: 250 CAPSULE ORAL at 10:11

## 2022-11-07 RX ADMIN — DOCUSATE SODIUM 100 MG: 100 CAPSULE ORAL at 08:11

## 2022-11-07 RX ADMIN — FAMOTIDINE 20 MG: 20 TABLET ORAL at 10:11

## 2022-11-07 RX ADMIN — ACETAMINOPHEN 650 MG: 325 TABLET ORAL at 10:11

## 2022-11-07 RX ADMIN — PREDNISONE 20 MG: 20 TABLET ORAL at 10:11

## 2022-11-07 RX ADMIN — SODIUM BICARBONATE 650 MG TABLET 650 MG: at 08:11

## 2022-11-07 RX ADMIN — FLUCONAZOLE 200 MG: 200 TABLET ORAL at 10:11

## 2022-11-07 RX ADMIN — MUPIROCIN 1 G: 20 OINTMENT TOPICAL at 08:11

## 2022-11-07 RX ADMIN — OXYCODONE 5 MG: 5 TABLET ORAL at 10:11

## 2022-11-07 RX ADMIN — BISACODYL 10 MG: 5 TABLET, COATED ORAL at 08:11

## 2022-11-07 RX ADMIN — OXYCODONE 5 MG: 5 TABLET ORAL at 05:11

## 2022-11-07 RX ADMIN — DOCUSATE SODIUM 100 MG: 100 CAPSULE ORAL at 10:11

## 2022-11-07 RX ADMIN — CARVEDILOL 12.5 MG: 12.5 TABLET, FILM COATED ORAL at 05:11

## 2022-11-07 RX ADMIN — TACROLIMUS 4 MG: 1 CAPSULE ORAL at 05:11

## 2022-11-07 RX ADMIN — SODIUM BICARBONATE 650 MG TABLET 650 MG: at 10:11

## 2022-11-07 RX ADMIN — ACETAMINOPHEN 650 MG: 325 TABLET ORAL at 05:11

## 2022-11-07 RX ADMIN — HEPARIN SODIUM 5000 UNITS: 5000 INJECTION INTRAVENOUS; SUBCUTANEOUS at 08:11

## 2022-11-07 RX ADMIN — ACETAMINOPHEN 650 MG: 325 TABLET ORAL at 03:11

## 2022-11-07 RX ADMIN — NIFEDIPINE 30 MG: 30 TABLET, FILM COATED, EXTENDED RELEASE ORAL at 10:11

## 2022-11-07 RX ADMIN — ASPIRIN 81 MG CHEWABLE TABLET 81 MG: 81 TABLET CHEWABLE at 10:11

## 2022-11-07 RX ADMIN — DOCUSATE SODIUM 100 MG: 100 CAPSULE ORAL at 03:11

## 2022-11-07 RX ADMIN — POTASSIUM CHLORIDE 20 MEQ: 1500 TABLET, EXTENDED RELEASE ORAL at 10:11

## 2022-11-07 NOTE — PROGRESS NOTES
"[facility] - [facility]  Kidney Transplant  Progress Note      Reason for Follow-up: Reassessment of Kidney, Pancreas Transplant - 11/3/2022  (#1) recipient and management of immunosuppression.    ORGAN:  RIGHT KIDNEY   Donor Type:  Donation after Brain Death   Donor CMV Status: Negative  Donor HBcAB:Negative  Donor HCV Status:Negative  Donor HBV KEE: Negative  Donor HCV KEE: Negative      Subjective:   History of Present Illness:  Ms. Read is a 27 y.o. year old  female with ESRD secondary to diabetic nephropathy and HTN.  She has been on the wait list for a kidney transplant at [facility] since 2021. Patient is currently on hemodialysis started on 21. Patient is dialyzing on TTS schedule, last HD 22. Patient reports that she is tolerating dialysis well. She has a LUE AV fistula. She is dialyzing 4 hours per session. Dry weight 61 kg. She reports urinating usually once per day ("normal" amount per urination)    She now presents as a direct admit as primary candidate for a  donor kidney/pancreas transplant. OR times have not been established at this time. Pt is currently not NPO. Dr. Rodriges will be the primary surgeon. Thymo induction. Pre-op labs and imaging have been ordered and will be reviewed prior to transplant. Endocrine consulted to assist with blood sugar management.    Hospital Course:  Underwent combined kidney and pancreas transplant 2022 which was uneventful. Sent to ICU for recovery, extubated.  Immediately making urine. Insulin discontinued within several hours of arrival to ICU. Stepped down to TSU POD#1. Diet advanced .     Interval History: no acute events overnight. Continues to progress well. Reports poor sleep overnight, melatonin ordered PRN. Cr trending down. Dc dc, able to void. Check PVR. Panc enzymes slightly elevated. Drain with 690 ml serous drg, send for amylase. Panc US ordered. Tolerating diet, no N/V, (+) flatus, no BM since " surgery, start bowel regimen. Encouraged ambulation. Pain well controlled.       Past Medical, Surgical, Family, and Social History:   Unchanged from H&P.    Scheduled Meds:   acetaminophen  650 mg Oral Q6H    aspirin  81 mg Oral Daily    bisacodyL  10 mg Oral QHS    carvediloL  12.5 mg Oral BID WM    docusate sodium  100 mg Oral TID    ergocalciferol  50,000 Units Oral Q7 Days    famotidine  20 mg Oral Daily    fluconazole  200 mg Oral Daily    heparin (porcine)  5,000 Units Subcutaneous TID    mupirocin  1 g Nasal BID    mycophenolate  1,000 mg Oral BID    NIFEdipine  30 mg Oral Daily    [START ON 11/10/2022] nystatin  500,000 Units Mouth/Throat QID    polyethylene glycol  17 g Oral Daily    predniSONE  20 mg Oral Daily    sodium bicarbonate  650 mg Oral BID    [START ON 11/13/2022] sulfamethoxazole-trimethoprim 400-80mg  1 tablet Oral Daily AM    tacrolimus  4 mg Oral BID    [START ON 11/13/2022] valGANciclovir  450 mg Oral Daily    venlafaxine  37.5 mg Oral Daily     Continuous Infusions:  PRN Meds:sodium chloride 0.9%, dextrose 10%, dextrose 10%, hydrALAZINE, labetalol, melatonin, naloxone, oxyCODONE, oxyCODONE    Intake/Output - Last 3 Shifts         11/05 0700 11/06 0659 11/06 0700 11/07 0659 11/07 0700  11/08 0659    P.O. 1900 1460 300    I.V. (mL/kg) 0 (0) 0 (0)     Blood       Other 0 0     IV Piggyback       Total Intake(mL/kg) 1900 (26.1) 1460 (20.1) 300 (4.1)    Urine (mL/kg/hr) 1280 (0.7) 2050 (1.2) 300 (0.8)    Emesis/NG output 0 0     Drains 400 690 100    Other 0 0     Stool 0 0     Blood 0 0     Total Output 1680 2740 400    Net +220 -1280 -100           Urine Occurrence 0 x 0 x     Stool Occurrence 0 x 0 x     Emesis Occurrence 0 x 0 x              Review of Systems   Constitutional: Negative.  Negative for appetite change, chills, fatigue and fever.   HENT: Negative.     Respiratory:  Negative for cough, chest tightness and shortness of breath.    Cardiovascular:  Negative  "for chest pain, palpitations and leg swelling.   Gastrointestinal:  Positive for abdominal pain. Negative for abdominal distention, constipation, diarrhea, nausea and vomiting.   Genitourinary:  Negative for difficulty urinating, dysuria, frequency and urgency.   Musculoskeletal:  Negative for back pain and neck pain.   Skin:  Negative for color change, pallor, rash and wound.   Allergic/Immunologic: Positive for immunocompromised state.   Neurological: Negative.  Negative for dizziness, weakness and headaches.   Psychiatric/Behavioral:  Negative for confusion and sleep disturbance.     Objective:     Vital Signs (Most Recent):  Temp: 98.5 °F (36.9 °C) (11/07/22 0837)  Pulse: 107 (11/07/22 1202)  Resp: 17 (11/07/22 1023)  BP: (!) 163/96 (11/07/22 1016)  SpO2: 97 % (11/07/22 0837)   Vital Signs (24h Range):  Temp:  [98.3 °F (36.8 °C)-98.7 °F (37.1 °C)] 98.5 °F (36.9 °C)  Pulse:  [101-110] 107  Resp:  [16-20] 17  SpO2:  [94 %-97 %] 97 %  BP: (121-163)/(67-96) 163/96     Weight: 72.8 kg (160 lb 7.9 oz)  Height: 5' 4" (162.6 cm)  Body mass index is 27.55 kg/m².    Physical Exam  Constitutional:       General: She is not in acute distress.  Cardiovascular:      Rate and Rhythm: Normal rate and regular rhythm.   Pulmonary:      Effort: Pulmonary effort is normal. No respiratory distress.      Breath sounds: Normal breath sounds.   Abdominal:      General: Bowel sounds are normal.      Palpations: Abdomen is soft. There is no mass.      Tenderness: There is abdominal tenderness.          Comments: Midline skin incision with intact skin staples  SINAN drain x1 in place with thin serosanguinous output   Musculoskeletal:         General: Normal range of motion.   Skin:     General: Skin is warm and dry.      Capillary Refill: Capillary refill takes less than 2 seconds.   Neurological:      General: No focal deficit present.      Mental Status: She is oriented to person, place, and time. Mental status is at baseline. "   Psychiatric:         Mood and Affect: Mood normal.         Behavior: Behavior normal.         Thought Content: Thought content normal.       Laboratory:  CBC:   Recent Labs   Lab 11/05/22  1636 11/06/22  0532 11/07/22  0556   WBC 9.20 5.69 7.79   RBC 2.68* 2.64* 2.59*   HGB 8.7* 8.5* 8.2*   HCT 25.8* 25.2* 24.7*    156 177   MCV 96 96 95   MCH 32.5* 32.2* 31.7*   MCHC 33.7 33.7 33.2     CMP:   Recent Labs   Lab 11/02/22  0038 11/02/22  1419 11/05/22  1636 11/06/22  0532 11/07/22  0556   *   < > 118* 123* 108   CALCIUM 9.2   < > 8.2* 8.6* 9.0   ALBUMIN 4.0   < > 4.3 4.2 3.9   PROT 8.5*  --   --   --   --    *   < > 136 136 138   K 4.8   < > 3.4* 3.4* 3.5   CO2 26   < > 17* 20* 23   CL 93*   < > 101 101 106   BUN 40*   < > 54* 58* 48*   CREATININE 6.5*   < > 4.0* 3.3* 1.7*   ALKPHOS 73  --   --   --   --    ALT 16  --   --   --   --    AST 19  --   --   --   --     < > = values in this interval not displayed.     Coagulation:   Recent Labs   Lab 11/02/22  0038   APTT 27.2     Labs within the past 24 hours have been reviewed.    Diagnostic Results:  US - Kidney: Results for orders placed during the hospital encounter of 11/02/22    US Transplant Kidney With Doppler    Narrative  EXAMINATION:  US TRANSPLANT KIDNEY WITH DOPPLER    CLINICAL HISTORY:  cessation of urine post kidney/pancreas transplant;    TECHNIQUE:  Real time gray scale and doppler ultrasound was performed over the patient's renal allograft.    COMPARISON:  Kidney transplant Doppler ultrasound 11/04/2022.    FINDINGS:  Renal allograft in the right lower quadrant.  The allograft measures 10.8 cm, previously 10.6 cm.  Normal perfusion. No hydronephrosis.  There is a ureteral stent.    Similar size of peritransplant fluid collection measuring 1.4 x 4.1 x 2.5 cm (previously 1.8 x 3.0 x 3.2 cm).    Vasculature:    Resistive indices:    Intralobar: 0.69, previously 0.76.    Upper segmental: 0.74, previously 0.74.    Mid segmental: 0.75,  previously 0.74.    Lower segmental: 0.70, previously 0.69.    Main renal artery peak systolic velocity: 287cm/sec with normal waveform, previously 384 centimeter/second..    Renal artery/iliac ratio: 2.1.    The main renal vein is patent.    Bladder is decompressed around a Moncada catheter and poorly visualized.    Impression  1. Interval improvement of elevated peak systolic velocity of the main renal artery.  2. Mild decrease in the resistive index in the intralobar renal artery.  Remainder of resistive indices are similar to ultrasound from 11/04/2022.  3. Similar size of peritransplant fluid collection.    Electronically signed by resident: Palmira Lewis  Date:    11/05/2022  Time:    18:11    Electronically signed by: Adrian Knox MD  Date:    11/05/2022  Time:    18:46    US - Pancreas: Results for orders placed during the hospital encounter of 11/02/22    US Pancreas Transplant Post    Narrative  EXAMINATION:  US DOPPLER PANCREAS TRANSPLANT POST (XPD)    CLINICAL HISTORY:  POD1 kidney pancreas;    TECHNIQUE:  Grayscale, color flow, and spectral analysis was used to evaluate the pancreas allograft using transabdominal ultrasound technique.    COMPARISON:  None    FINDINGS:  GRAYSCALE    Pancreatic allograft is homogeneous in echogenicity.  No peripancreatic fluid collections.    DOPPLER    Peak systolic velocities    Conduit: 310 cm/s    Splenic artery: 42 cm/s    Splenic vein: 31 cm/s    Portal vein (donor): 48 cm/s    Superior mesenteric vein: 79 cm/s    Iliac artery: 253 cm/s    SMA of Y-graft: 36 cm/s    Resistive indices:    Head RI: 0.50    Body RI: 0.59    Tail RI: 0.54    Waveforms are maintained.    MISCELLANEOUS: None.    Impression  Satisfactory grayscale and Doppler evaluation of the pancreas allograft.    Discussed with Dr. Morse at 11:55 AM.    Electronically signed by resident: Reuben Vivas  Date:    11/03/2022  Time:    10:59    Electronically signed by: Edilson Moreno  "MD  Date:    11/03/2022  Time:    11:57    Assessment/Plan:     * Status post simultaneous kidney and pancreas transplant  - S/p SPK 11/3/22. Surgery without complication  - Cr trending down, excellent uop. Dc replaced x 2. See "urinary retention"  - POD#1 kidney/panc US 11/3 stable  - Panc enzymes trended down, slightly elevated 11/7   - Repeat Panc US  - Drain with 690 ml/day. Send for amylase.   - Diet advanced to regular 11/5.   - Tolerating diet, no N/V, (+) flatus, bowel regimen started. Encourage ambulation.     Urinary retention  - dc replaced x 2 post-op  - Dc dc today, able to void  - Check PVR    Anemia of chronic disease  - chronic  - see acute blood loss anemia    Acute blood loss anemia  - Expected post-op  - H/H stable. Continue to monitor.     Long-term use of immunosuppressant medication  - Maintenance IS with prograf, MMF, and prednisone. cont to check tacrolimus level daily. Assess for toxicity and adjust level as needed      Prophylactic immunotherapy  - See long-term use of immunosuppressant medication    At risk for opportunistic infections  - continue valcyte for CMV prophylaxis for 3 months (CMV D-/R+)  - continue bactrim for PJP prophylaxis for 6 months  - continue nystatin for thrush prevention for 4 weeks  - Bactrim and valcyte to start on POD#10 or at discharge    Hypertension, essential  - monitor closely and watch for orthostasis.  Treat as needed.        Discharge Planning: not stable for dc at this time.     Abby Mccord, KUMAR  Kidney Transplant  [facility] - [facility]  "

## 2022-11-07 NOTE — PROGRESS NOTES
EDUCATION NOTE:    Met with Isabela Lewisique Jacek and her caregivers to provide teaching re: immunosuppressant medications.  Reviewed medication section of the Kidney & Pancreas Transplant Education book that was provided.  Emphasized the importance of compliance, role of the blue medication card, concerns for drug interactions, and process of obtaining refills.  Counseled regarding Prograf, Cellcept , prednisone, including directions for use, monitoring, how to handle missed doses, and side effects.  Ms. Read verbalized understanding and had the opportunity to ask questions.

## 2022-11-07 NOTE — ASSESSMENT & PLAN NOTE
- continue valcyte for CMV prophylaxis for 3 months (CMV D-/R+)  - continue bactrim for PJP prophylaxis for 6 months  - continue nystatin for thrush prevention for 4 weeks  - Bactrim and valcyte to start on POD#10 or at discharge

## 2022-11-07 NOTE — ASSESSMENT & PLAN NOTE
"- S/p SPK 11/3/22. Surgery without complication  - Cr trending down, excellent uop. Moncada replaced x 2. See "urinary retention"  - POD#1 kidney/panc US 11/3 stable  - Panc enzymes trended down, slightly elevated 11/7   - Repeat Panc US  - Drain with 690 ml/day. Send for amylase.   - Diet advanced to regular 11/5.   - Tolerating diet, no N/V, (+) flatus, bowel regimen started. Encourage ambulation.   "

## 2022-11-07 NOTE — PROGRESS NOTES
"[facility] - Transplant [facility]down  Adult Nutrition  Progress Note    SUMMARY       Recommendations    Continue Regular diet    If PO intake <50%, add Boost Plus BID     RD to monitor and follow    Goals: Meet % EEN, EPN by RD f/u date  Nutrition Goal Status: progressing towards goal  Communication of RD Recs:  (POC)    Assessment and Plan    Nutrition Problem:  Increased nutrient needs     Related to (etiology):   Physiological causes      Signs and Symptoms (as evidenced by):   S/p kidney/pancreas tx      Interventions(treatment strategy):  Collaboration of nutrition care w/ other providers  Nutrition education     Nutrition Diagnosis Status:   Continue    Reason for Assessment    Reason For Assessment: RD follow-up  Diagnosis: other (see comments) (DM1 w/ ESRD)  Relevant Medical History: ESRD on HD, DM1  Interdisciplinary Rounds: did not attend    General Information Comments:   11/7: Pt reports good appetite, tolerating % of meals. Denies N/V, chewing/swallowing difficulties. Post tx diet education provided today. Discussed food preferences and made note on [employer].    11/3: NPO; s/p kidney & pancreas transplant. Pt seen by RD yesterday for diabetic diet education. Pt reported good appetite PTA & UBW of 155#; chart review confirms. Pt appears nourished w/ no indicators of malnutrition.    Nutrition Discharge Planning: Post transplant nutrition education provided on 11/7. Food safety/drug interactions emphasized. General healthy/low salt diet recommended. Education material left at bedside. No other needs identified.    Nutrition Risk Screen    Nutrition Risk Screen: no indicators present    Nutrition/Diet History    Spiritual, Cultural Beliefs, Druze Practices, Values that Affect Care: no  Factors Affecting Nutritional Intake: NPO    Anthropometrics    Temp: 98.5 °F (36.9 °C)  Height: 5' 4" (162.6 cm)  Height (inches): 64 in  Weight Method: Bed Scale  Weight: 72.8 kg (160 lb 7.9 oz)  Weight (lb): " 160.5 lb  Ideal Body Weight (IBW), Female: 120 lb  % Ideal Body Weight, Female (lb): 127.87 %  BMI (Calculated): 27.5  BMI Grade: 25 - 29.9 - overweight     Lab/Procedures/Meds    Pertinent Labs Reviewed: reviewed  Pertinent Labs Comments: BUN 48, Cr 1.7, eGFR 41.9  Pertinent Medications Reviewed: reviewed  Pertinent Medications Comments: tacrolimus, prednisone    Estimated/Assessed Needs    Weight Used For Calorie Calculations: 69.6 kg (153 lb 7 oz)  Energy Calorie Requirements (kcal): 1770 kcal/d  Energy Need Method: Banner-St Jeor (1.25 PAL)  Protein Requirements: 84 g/d (1.2 g/kg)  Weight Used For Protein Calculations: 69.6 kg (153 lb 7 oz)     Estimated Fluid Requirement Method: other (see comments) (Per MD or 1 mL/kcal)  RDA Method (mL): 1770  CHO Requirement: 221g    Nutrition Prescription Ordered    Current Diet Order: Regular    Evaluation of Received Nutrient/Fluid Intake    I/O: + 0.9L since admit  Comments: LBM 11/6  Tolerance: tolerating    Nutrition Risk    Level of Risk/Frequency of Follow-up:  (1x/week)     Monitor and Evaluation    Food and Nutrient Intake: food and beverage intake, enteral nutrition intake  Food and Nutrient Adminstration: diet order, enteral and parenteral nutrition administration  Physical Activity and Function: nutrition-related ADLs and IADLs  Anthropometric Measurements: weight, weight change  Biochemical Data, Medical Tests and Procedures: inflammatory profile, lipid profile, glucose/endocrine profile, gastrointestinal profile, electrolyte and renal panel  Nutrition-Focused Physical Findings: overall appearance     Nutrition Follow-Up    RD Follow-up?: Yes    Arabella DELEON

## 2022-11-07 NOTE — PLAN OF CARE
Pt is AAOx4, afebrile, and VSS overnight. Pt spO2 >94% on RA overnight. Pt has a midline incision, ALY w/ staples. Self meds pulled overnight, reinforcement needed. Pt has a dc w/ cloudy , yellow UO overnight. Plan for poss dc d/c in am . Pt has RLQ SINAN drain putting out moderate ss drainage overnight. Pt pain controlled with PRN PO Oxycodone. 0 Bms overnight. Pt is in bed in the lowest position, wheels locked, and call light in reach.

## 2022-11-07 NOTE — ASSESSMENT & PLAN NOTE
- Maintenance IS with prograf, MMF, and prednisone. cont to check tacrolimus level daily. Assess for toxicity and adjust level as needed

## 2022-11-07 NOTE — SUBJECTIVE & OBJECTIVE
"  Subjective:   History of Present Illness:  Ms. Read is a 27 y.o. year old  female with ESRD secondary to diabetic nephropathy and HTN.  She has been on the wait list for a kidney transplant at [facility] since 2021. Patient is currently on hemodialysis started on 21. Patient is dialyzing on TTS schedule, last HD 22. Patient reports that she is tolerating dialysis well. She has a LUE AV fistula. She is dialyzing 4 hours per session. Dry weight 61 kg. She reports urinating usually once per day ("normal" amount per urination)    She now presents as a direct admit as primary candidate for a  donor kidney/pancreas transplant. OR times have not been established at this time. Pt is currently not NPO. Dr. Rodriges will be the primary surgeon. Thymo induction. Pre-op labs and imaging have been ordered and will be reviewed prior to transplant. Endocrine consulted to assist with blood sugar management.    Hospital Course:  Underwent combined kidney and pancreas transplant 2022 which was uneventful. Sent to ICU for recovery, extubated.  Immediately making urine. Insulin discontinued within several hours of arrival to ICU. Stepped down to TSU POD#1. Diet advanced .     Interval History: no acute events overnight. Continues to progress well. Reports poor sleep overnight, melatonin ordered PRN. Cr trending down. Dc dc, able to void. Check PVR. Panc enzymes slightly elevated. Drain with 690 ml serous drg, send for amylase. Panc US ordered. Tolerating diet, no N/V, (+) flatus, no BM since surgery, start bowel regimen. Encouraged ambulation. Pain well controlled.       Past Medical, Surgical, Family, and Social History:   Unchanged from H&P.    Scheduled Meds:   acetaminophen  650 mg Oral Q6H    aspirin  81 mg Oral Daily    bisacodyL  10 mg Oral QHS    carvediloL  12.5 mg Oral BID WM    docusate sodium  100 mg Oral TID    ergocalciferol  50,000 Units Oral Q7 Days    famotidine  20 mg " Oral Daily    fluconazole  200 mg Oral Daily    heparin (porcine)  5,000 Units Subcutaneous TID    mupirocin  1 g Nasal BID    mycophenolate  1,000 mg Oral BID    NIFEdipine  30 mg Oral Daily    [START ON 11/10/2022] nystatin  500,000 Units Mouth/Throat QID    polyethylene glycol  17 g Oral Daily    predniSONE  20 mg Oral Daily    sodium bicarbonate  650 mg Oral BID    [START ON 11/13/2022] sulfamethoxazole-trimethoprim 400-80mg  1 tablet Oral Daily AM    tacrolimus  4 mg Oral BID    [START ON 11/13/2022] valGANciclovir  450 mg Oral Daily    venlafaxine  37.5 mg Oral Daily     Continuous Infusions:  PRN Meds:sodium chloride 0.9%, dextrose 10%, dextrose 10%, hydrALAZINE, labetalol, melatonin, naloxone, oxyCODONE, oxyCODONE    Intake/Output - Last 3 Shifts         11/05 0700 11/06 0659 11/06 0700 11/07 0659 11/07 0700 11/08 0659    P.O. 1900 1460 300    I.V. (mL/kg) 0 (0) 0 (0)     Blood       Other 0 0     IV Piggyback       Total Intake(mL/kg) 1900 (26.1) 1460 (20.1) 300 (4.1)    Urine (mL/kg/hr) 1280 (0.7) 2050 (1.2) 300 (0.8)    Emesis/NG output 0 0     Drains 400 690 100    Other 0 0     Stool 0 0     Blood 0 0     Total Output 1680 2740 400    Net +220 -1280 -100           Urine Occurrence 0 x 0 x     Stool Occurrence 0 x 0 x     Emesis Occurrence 0 x 0 x              Review of Systems   Constitutional: Negative.  Negative for appetite change, chills, fatigue and fever.   HENT: Negative.     Respiratory:  Negative for cough, chest tightness and shortness of breath.    Cardiovascular:  Negative for chest pain, palpitations and leg swelling.   Gastrointestinal:  Positive for abdominal pain. Negative for abdominal distention, constipation, diarrhea, nausea and vomiting.   Genitourinary:  Negative for difficulty urinating, dysuria, frequency and urgency.   Musculoskeletal:  Negative for back pain and neck pain.   Skin:  Negative for color change, pallor, rash and wound.   Allergic/Immunologic: Positive for  "immunocompromised state.   Neurological: Negative.  Negative for dizziness, weakness and headaches.   Psychiatric/Behavioral:  Negative for confusion and sleep disturbance.     Objective:     Vital Signs (Most Recent):  Temp: 98.5 °F (36.9 °C) (11/07/22 0837)  Pulse: 107 (11/07/22 1202)  Resp: 17 (11/07/22 1023)  BP: (!) 163/96 (11/07/22 1016)  SpO2: 97 % (11/07/22 0837)   Vital Signs (24h Range):  Temp:  [98.3 °F (36.8 °C)-98.7 °F (37.1 °C)] 98.5 °F (36.9 °C)  Pulse:  [101-110] 107  Resp:  [16-20] 17  SpO2:  [94 %-97 %] 97 %  BP: (121-163)/(67-96) 163/96     Weight: 72.8 kg (160 lb 7.9 oz)  Height: 5' 4" (162.6 cm)  Body mass index is 27.55 kg/m².    Physical Exam  Constitutional:       General: She is not in acute distress.  Cardiovascular:      Rate and Rhythm: Normal rate and regular rhythm.   Pulmonary:      Effort: Pulmonary effort is normal. No respiratory distress.      Breath sounds: Normal breath sounds.   Abdominal:      General: Bowel sounds are normal.      Palpations: Abdomen is soft. There is no mass.      Tenderness: There is abdominal tenderness.          Comments: Midline skin incision with intact skin staples  SINAN drain x1 in place with thin serosanguinous output   Musculoskeletal:         General: Normal range of motion.   Skin:     General: Skin is warm and dry.      Capillary Refill: Capillary refill takes less than 2 seconds.   Neurological:      General: No focal deficit present.      Mental Status: She is oriented to person, place, and time. Mental status is at baseline.   Psychiatric:         Mood and Affect: Mood normal.         Behavior: Behavior normal.         Thought Content: Thought content normal.       Laboratory:  CBC:   Recent Labs   Lab 11/05/22  1636 11/06/22  0532 11/07/22  0556   WBC 9.20 5.69 7.79   RBC 2.68* 2.64* 2.59*   HGB 8.7* 8.5* 8.2*   HCT 25.8* 25.2* 24.7*    156 177   MCV 96 96 95   MCH 32.5* 32.2* 31.7*   MCHC 33.7 33.7 33.2     CMP:   Recent Labs   Lab " 11/02/22  0038 11/02/22  1419 11/05/22  1636 11/06/22  0532 11/07/22  0556   *   < > 118* 123* 108   CALCIUM 9.2   < > 8.2* 8.6* 9.0   ALBUMIN 4.0   < > 4.3 4.2 3.9   PROT 8.5*  --   --   --   --    *   < > 136 136 138   K 4.8   < > 3.4* 3.4* 3.5   CO2 26   < > 17* 20* 23   CL 93*   < > 101 101 106   BUN 40*   < > 54* 58* 48*   CREATININE 6.5*   < > 4.0* 3.3* 1.7*   ALKPHOS 73  --   --   --   --    ALT 16  --   --   --   --    AST 19  --   --   --   --     < > = values in this interval not displayed.     Coagulation:   Recent Labs   Lab 11/02/22  0038   APTT 27.2     Labs within the past 24 hours have been reviewed.    Diagnostic Results:  US - Kidney: Results for orders placed during the hospital encounter of 11/02/22    US Transplant Kidney With Doppler    Narrative  EXAMINATION:  US TRANSPLANT KIDNEY WITH DOPPLER    CLINICAL HISTORY:  cessation of urine post kidney/pancreas transplant;    TECHNIQUE:  Real time gray scale and doppler ultrasound was performed over the patient's renal allograft.    COMPARISON:  Kidney transplant Doppler ultrasound 11/04/2022.    FINDINGS:  Renal allograft in the right lower quadrant.  The allograft measures 10.8 cm, previously 10.6 cm.  Normal perfusion. No hydronephrosis.  There is a ureteral stent.    Similar size of peritransplant fluid collection measuring 1.4 x 4.1 x 2.5 cm (previously 1.8 x 3.0 x 3.2 cm).    Vasculature:    Resistive indices:    Intralobar: 0.69, previously 0.76.    Upper segmental: 0.74, previously 0.74.    Mid segmental: 0.75, previously 0.74.    Lower segmental: 0.70, previously 0.69.    Main renal artery peak systolic velocity: 287cm/sec with normal waveform, previously 384 centimeter/second..    Renal artery/iliac ratio: 2.1.    The main renal vein is patent.    Bladder is decompressed around a Moncada catheter and poorly visualized.    Impression  1. Interval improvement of elevated peak systolic velocity of the main renal artery.  2. Mild  decrease in the resistive index in the intralobar renal artery.  Remainder of resistive indices are similar to ultrasound from 11/04/2022.  3. Similar size of peritransplant fluid collection.    Electronically signed by resident: Palmira Lewis  Date:    11/05/2022  Time:    18:11    Electronically signed by: Adrian Knox MD  Date:    11/05/2022  Time:    18:46    US - Pancreas: Results for orders placed during the hospital encounter of 11/02/22    US Pancreas Transplant Post    Narrative  EXAMINATION:  US DOPPLER PANCREAS TRANSPLANT POST (XPD)    CLINICAL HISTORY:  POD1 kidney pancreas;    TECHNIQUE:  Grayscale, color flow, and spectral analysis was used to evaluate the pancreas allograft using transabdominal ultrasound technique.    COMPARISON:  None    FINDINGS:  GRAYSCALE    Pancreatic allograft is homogeneous in echogenicity.  No peripancreatic fluid collections.    DOPPLER    Peak systolic velocities    Conduit: 310 cm/s    Splenic artery: 42 cm/s    Splenic vein: 31 cm/s    Portal vein (donor): 48 cm/s    Superior mesenteric vein: 79 cm/s    Iliac artery: 253 cm/s    SMA of Y-graft: 36 cm/s    Resistive indices:    Head RI: 0.50    Body RI: 0.59    Tail RI: 0.54    Waveforms are maintained.    MISCELLANEOUS: None.    Impression  Satisfactory grayscale and Doppler evaluation of the pancreas allograft.    Discussed with Dr. Morse at 11:55 AM.    Electronically signed by resident: Reuben Vivas  Date:    11/03/2022  Time:    10:59    Electronically signed by: Edilson Moreno MD  Date:    11/03/2022  Time:    11:57

## 2022-11-07 NOTE — PROGRESS NOTES
MYCOPHENOLATE REMS PROGRAM:    I reviewed the mycophenolate REMS program with the patient.  Provided the mycophenolate REMS Guide to the patient.  Patient verbalized understanding and had the opportunity to ask questions.

## 2022-11-07 NOTE — CARE UPDATE
BG goal: 140-180   Diet Adult Regular (IDDSI Level 7)  5 Days Post-Op      Type 1 DM s/p kidney/pancreas transplant 11/3/2022. Insulin drip was able to be stopped after sx. She has not required insulin since then.  Remains on Prednisone 20 mg daily.      - Discontinue Novolog Low dose correction and BG monitoring ac/hs  -Recommend continuing to monitor BG with am labs     Discharge Planning:   No needs     Lab Results   Component Value Date    HGBA1C 11.1 (H) 11/02/2022       Endocrine to sign off at this time. Patient with no insulin needs while tolerating diet and on steroid therapy. Please reconsult if needed.

## 2022-11-08 DIAGNOSIS — E10.29 TYPE 1 DIABETES MELLITUS WITH OTHER DIABETIC KIDNEY COMPLICATION: ICD-10-CM

## 2022-11-08 DIAGNOSIS — Z94.83 PANCREAS REPLACED BY TRANSPLANT: ICD-10-CM

## 2022-11-08 DIAGNOSIS — Z94.0 KIDNEY REPLACED BY TRANSPLANT: Primary | ICD-10-CM

## 2022-11-08 DIAGNOSIS — Z21 ASYMPTOMATIC HUMAN IMMUNODEFICIENCY VIRUS (HIV) INFECTION STATUS: ICD-10-CM

## 2022-11-08 DIAGNOSIS — N18.6 END STAGE RENAL DISEASE: ICD-10-CM

## 2022-11-08 DIAGNOSIS — Z94.83 STATUS POST PANCREAS TRANSPLANTATION: ICD-10-CM

## 2022-11-08 LAB
ALBUMIN SERPL BCP-MCNC: 3.5 G/DL (ref 3.5–5.2)
AMYLASE SERPL-CCNC: 123 U/L (ref 20–110)
ANION GAP SERPL CALC-SCNC: 7 MMOL/L (ref 8–16)
BASOPHILS # BLD AUTO: 0.01 K/UL (ref 0–0.2)
BASOPHILS NFR BLD: 0.1 % (ref 0–1.9)
BUN SERPL-MCNC: 29 MG/DL (ref 6–20)
CALCIUM SERPL-MCNC: 9 MG/DL (ref 8.7–10.5)
CHLORIDE SERPL-SCNC: 108 MMOL/L (ref 95–110)
CO2 SERPL-SCNC: 23 MMOL/L (ref 23–29)
CREAT SERPL-MCNC: 1 MG/DL (ref 0.5–1.4)
DIFFERENTIAL METHOD: ABNORMAL
EOSINOPHIL # BLD AUTO: 0 K/UL (ref 0–0.5)
EOSINOPHIL NFR BLD: 0.4 % (ref 0–8)
ERYTHROCYTE [DISTWIDTH] IN BLOOD BY AUTOMATED COUNT: 14.7 % (ref 11.5–14.5)
EST. GFR  (NO RACE VARIABLE): >60 ML/MIN/1.73 M^2
GLUCOSE SERPL-MCNC: 343 MG/DL (ref 70–110)
GLUCOSE SERPL-MCNC: 90 MG/DL (ref 70–110)
HCO3 UR-SCNC: 20.9 MMOL/L (ref 24–28)
HCT VFR BLD AUTO: 26.1 % (ref 37–48.5)
HCT VFR BLD CALC: 27 %PCV (ref 36–54)
HGB BLD-MCNC: 8.9 G/DL (ref 12–16)
IMM GRANULOCYTES # BLD AUTO: 0.1 K/UL (ref 0–0.04)
IMM GRANULOCYTES NFR BLD AUTO: 1.3 % (ref 0–0.5)
LIPASE SERPL-CCNC: 54 U/L (ref 4–60)
LYMPHOCYTES # BLD AUTO: 1.3 K/UL (ref 1–4.8)
LYMPHOCYTES NFR BLD: 16.8 % (ref 18–48)
MAGNESIUM SERPL-MCNC: 2 MG/DL (ref 1.6–2.6)
MCH RBC QN AUTO: 32.7 PG (ref 27–31)
MCHC RBC AUTO-ENTMCNC: 34.1 G/DL (ref 32–36)
MCV RBC AUTO: 96 FL (ref 82–98)
MONOCYTES # BLD AUTO: 0.6 K/UL (ref 0.3–1)
MONOCYTES NFR BLD: 8.2 % (ref 4–15)
NEUTROPHILS # BLD AUTO: 5.8 K/UL (ref 1.8–7.7)
NEUTROPHILS NFR BLD: 73.2 % (ref 38–73)
NRBC BLD-RTO: 0 /100 WBC
PCO2 BLDA: 27.6 MMHG (ref 35–45)
PH SMN: 7.49 [PH] (ref 7.35–7.45)
PHOSPHATE SERPL-MCNC: 1.4 MG/DL (ref 2.7–4.5)
PHOSPHATE SERPL-MCNC: 1.4 MG/DL (ref 2.7–4.5)
PLATELET # BLD AUTO: 204 K/UL (ref 150–450)
PMV BLD AUTO: 12.1 FL (ref 9.2–12.9)
PO2 BLDA: 109 MMHG (ref 80–100)
POC BE: -2 MMOL/L
POC IONIZED CALCIUM: 1.1 MMOL/L (ref 1.06–1.42)
POC SATURATED O2: 99 % (ref 95–100)
POC TCO2: 22 MMOL/L (ref 23–27)
POTASSIUM BLD-SCNC: 4.6 MMOL/L (ref 3.5–5.1)
POTASSIUM SERPL-SCNC: 4.2 MMOL/L (ref 3.5–5.1)
RBC # BLD AUTO: 2.72 M/UL (ref 4–5.4)
SAMPLE: ABNORMAL
SODIUM BLD-SCNC: 138 MMOL/L (ref 136–145)
SODIUM SERPL-SCNC: 138 MMOL/L (ref 136–145)
TACROLIMUS BLD-MCNC: 6.9 NG/ML (ref 5–15)
WBC # BLD AUTO: 7.85 K/UL (ref 3.9–12.7)

## 2022-11-08 PROCEDURE — 83735 ASSAY OF MAGNESIUM: CPT | Performed by: PHYSICIAN ASSISTANT

## 2022-11-08 PROCEDURE — 93010 EKG 12-LEAD: ICD-10-PCS | Mod: ,,, | Performed by: INTERNAL MEDICINE

## 2022-11-08 PROCEDURE — 25000003 PHARM REV CODE 250: Performed by: STUDENT IN AN ORGANIZED HEALTH CARE EDUCATION/TRAINING PROGRAM

## 2022-11-08 PROCEDURE — 63600175 PHARM REV CODE 636 W HCPCS: Performed by: NURSE PRACTITIONER

## 2022-11-08 PROCEDURE — 99233 PR SUBSEQUENT HOSPITAL CARE,LEVL III: ICD-10-PCS | Mod: 24,,, | Performed by: SURGERY

## 2022-11-08 PROCEDURE — 85025 COMPLETE CBC W/AUTO DIFF WBC: CPT | Performed by: STUDENT IN AN ORGANIZED HEALTH CARE EDUCATION/TRAINING PROGRAM

## 2022-11-08 PROCEDURE — 25000003 PHARM REV CODE 250: Performed by: NURSE PRACTITIONER

## 2022-11-08 PROCEDURE — 93005 ELECTROCARDIOGRAM TRACING: CPT

## 2022-11-08 PROCEDURE — 25000003 PHARM REV CODE 250: Performed by: TRANSPLANT SURGERY

## 2022-11-08 PROCEDURE — 20600001 HC STEP DOWN PRIVATE ROOM

## 2022-11-08 PROCEDURE — 25000003 PHARM REV CODE 250: Performed by: INTERNAL MEDICINE

## 2022-11-08 PROCEDURE — 82150 ASSAY OF AMYLASE: CPT | Performed by: STUDENT IN AN ORGANIZED HEALTH CARE EDUCATION/TRAINING PROGRAM

## 2022-11-08 PROCEDURE — 80069 RENAL FUNCTION PANEL: CPT | Performed by: STUDENT IN AN ORGANIZED HEALTH CARE EDUCATION/TRAINING PROGRAM

## 2022-11-08 PROCEDURE — 93010 ELECTROCARDIOGRAM REPORT: CPT | Mod: ,,, | Performed by: INTERNAL MEDICINE

## 2022-11-08 PROCEDURE — 99233 SBSQ HOSP IP/OBS HIGH 50: CPT | Mod: 24,,, | Performed by: SURGERY

## 2022-11-08 PROCEDURE — 83690 ASSAY OF LIPASE: CPT | Performed by: STUDENT IN AN ORGANIZED HEALTH CARE EDUCATION/TRAINING PROGRAM

## 2022-11-08 PROCEDURE — 63600175 PHARM REV CODE 636 W HCPCS: Performed by: STUDENT IN AN ORGANIZED HEALTH CARE EDUCATION/TRAINING PROGRAM

## 2022-11-08 PROCEDURE — 80197 ASSAY OF TACROLIMUS: CPT | Performed by: STUDENT IN AN ORGANIZED HEALTH CARE EDUCATION/TRAINING PROGRAM

## 2022-11-08 RX ORDER — CARVEDILOL 25 MG/1
25 TABLET ORAL 2 TIMES DAILY WITH MEALS
Status: DISCONTINUED | OUTPATIENT
Start: 2022-11-08 | End: 2022-11-09 | Stop reason: HOSPADM

## 2022-11-08 RX ORDER — TACROLIMUS 1 MG/1
2 CAPSULE ORAL ONCE
Status: DISCONTINUED | OUTPATIENT
Start: 2022-11-08 | End: 2022-11-08

## 2022-11-08 RX ORDER — ONDANSETRON 8 MG/1
8 TABLET, ORALLY DISINTEGRATING ORAL EVERY 8 HOURS PRN
Qty: 30 TABLET | Refills: 1 | Status: SHIPPED | OUTPATIENT
Start: 2022-11-08 | End: 2023-06-17

## 2022-11-08 RX ORDER — VENLAFAXINE HYDROCHLORIDE 37.5 MG/1
37.5 CAPSULE, EXTENDED RELEASE ORAL DAILY
Qty: 30 CAPSULE | Refills: 11 | Status: SHIPPED | OUTPATIENT
Start: 2022-11-08 | End: 2023-05-15 | Stop reason: ALTCHOICE

## 2022-11-08 RX ORDER — NIFEDIPINE 30 MG/1
30 TABLET, EXTENDED RELEASE ORAL 2 TIMES DAILY
Qty: 60 TABLET | Refills: 5 | Status: SHIPPED | OUTPATIENT
Start: 2022-11-08 | End: 2022-11-09 | Stop reason: SDUPTHER

## 2022-11-08 RX ORDER — LIDOCAINE HYDROCHLORIDE 10 MG/ML
1 INJECTION INFILTRATION; PERINEURAL ONCE
Status: COMPLETED | OUTPATIENT
Start: 2022-11-08 | End: 2022-11-08

## 2022-11-08 RX ORDER — OXYCODONE HYDROCHLORIDE 10 MG/1
5-10 TABLET ORAL EVERY 4 HOURS PRN
Qty: 30 TABLET | Refills: 0 | Status: SHIPPED | OUTPATIENT
Start: 2022-11-08 | End: 2022-11-16 | Stop reason: ALTCHOICE

## 2022-11-08 RX ORDER — NIFEDIPINE 30 MG/1
30 TABLET, EXTENDED RELEASE ORAL 2 TIMES DAILY
Status: DISCONTINUED | OUTPATIENT
Start: 2022-11-08 | End: 2022-11-09

## 2022-11-08 RX ORDER — MULTIVITAMIN
1 TABLET ORAL DAILY
Qty: 30 TABLET | Refills: 5 | Status: SHIPPED | OUTPATIENT
Start: 2022-11-08 | End: 2023-06-17

## 2022-11-08 RX ORDER — ERGOCALCIFEROL 1.25 MG/1
50000 CAPSULE ORAL
Qty: 4 CAPSULE | Refills: 5 | Status: SHIPPED | OUTPATIENT
Start: 2022-11-11 | End: 2023-11-13

## 2022-11-08 RX ORDER — ROSUVASTATIN CALCIUM 10 MG/1
10 TABLET, COATED ORAL NIGHTLY
Qty: 90 TABLET | Refills: 0 | Status: SHIPPED | OUTPATIENT
Start: 2022-11-08 | End: 2023-11-13

## 2022-11-08 RX ORDER — POLYETHYLENE GLYCOL 3350 17 G/17G
17 POWDER, FOR SOLUTION ORAL 2 TIMES DAILY PRN
Status: DISCONTINUED | OUTPATIENT
Start: 2022-11-08 | End: 2022-11-09 | Stop reason: HOSPADM

## 2022-11-08 RX ORDER — BISACODYL 5 MG
10 TABLET, DELAYED RELEASE (ENTERIC COATED) ORAL DAILY PRN
Status: DISCONTINUED | OUTPATIENT
Start: 2022-11-08 | End: 2022-11-09 | Stop reason: HOSPADM

## 2022-11-08 RX ORDER — TACROLIMUS 1 MG/1
6 CAPSULE ORAL 2 TIMES DAILY
Status: DISCONTINUED | OUTPATIENT
Start: 2022-11-08 | End: 2022-11-09 | Stop reason: HOSPADM

## 2022-11-08 RX ORDER — FAMOTIDINE 20 MG/1
20 TABLET, FILM COATED ORAL DAILY
Qty: 19 TABLET | Refills: 0 | Status: ON HOLD | OUTPATIENT
Start: 2022-11-08 | End: 2022-12-15 | Stop reason: HOSPADM

## 2022-11-08 RX ORDER — CARVEDILOL 12.5 MG/1
12.5 TABLET ORAL ONCE
Status: COMPLETED | OUTPATIENT
Start: 2022-11-08 | End: 2022-11-08

## 2022-11-08 RX ORDER — DOCUSATE SODIUM 100 MG/1
100 CAPSULE, LIQUID FILLED ORAL 3 TIMES DAILY PRN
Status: DISCONTINUED | OUTPATIENT
Start: 2022-11-08 | End: 2022-11-09 | Stop reason: HOSPADM

## 2022-11-08 RX ADMIN — PREDNISONE 20 MG: 20 TABLET ORAL at 10:11

## 2022-11-08 RX ADMIN — HEPARIN SODIUM 5000 UNITS: 5000 INJECTION INTRAVENOUS; SUBCUTANEOUS at 09:11

## 2022-11-08 RX ADMIN — MYCOPHENOLATE MOFETIL 1000 MG: 250 CAPSULE ORAL at 08:11

## 2022-11-08 RX ADMIN — ASPIRIN 81 MG CHEWABLE TABLET 81 MG: 81 TABLET CHEWABLE at 10:11

## 2022-11-08 RX ADMIN — DIBASIC SODIUM PHOSPHATE, MONOBASIC POTASSIUM PHOSPHATE AND MONOBASIC SODIUM PHOSPHATE 2 TABLET: 852; 155; 130 TABLET ORAL at 08:11

## 2022-11-08 RX ADMIN — DIBASIC SODIUM PHOSPHATE, MONOBASIC POTASSIUM PHOSPHATE AND MONOBASIC SODIUM PHOSPHATE 2 TABLET: 852; 155; 130 TABLET ORAL at 10:11

## 2022-11-08 RX ADMIN — MYCOPHENOLATE MOFETIL 1000 MG: 250 CAPSULE ORAL at 10:11

## 2022-11-08 RX ADMIN — TACROLIMUS 6 MG: 1 CAPSULE ORAL at 10:11

## 2022-11-08 RX ADMIN — CARVEDILOL 25 MG: 25 TABLET, FILM COATED ORAL at 04:11

## 2022-11-08 RX ADMIN — NIFEDIPINE 30 MG: 30 TABLET, FILM COATED, EXTENDED RELEASE ORAL at 08:11

## 2022-11-08 RX ADMIN — ACETAMINOPHEN 650 MG: 325 TABLET ORAL at 11:11

## 2022-11-08 RX ADMIN — HEPARIN SODIUM 5000 UNITS: 5000 INJECTION INTRAVENOUS; SUBCUTANEOUS at 08:11

## 2022-11-08 RX ADMIN — LIDOCAINE HYDROCHLORIDE 1 ML: 10 INJECTION, SOLUTION INFILTRATION; PERINEURAL at 11:11

## 2022-11-08 RX ADMIN — SODIUM BICARBONATE 650 MG TABLET 650 MG: at 08:11

## 2022-11-08 RX ADMIN — CARVEDILOL 12.5 MG: 12.5 TABLET, FILM COATED ORAL at 08:11

## 2022-11-08 RX ADMIN — TACROLIMUS 6 MG: 1 CAPSULE ORAL at 05:11

## 2022-11-08 RX ADMIN — FLUCONAZOLE 200 MG: 200 TABLET ORAL at 10:11

## 2022-11-08 RX ADMIN — SODIUM PHOSPHATE, MONOBASIC, MONOHYDRATE AND SODIUM PHOSPHATE, DIBASIC, ANHYDROUS 30 MMOL: 142; 276 INJECTION, SOLUTION INTRAVENOUS at 11:11

## 2022-11-08 RX ADMIN — OXYCODONE HYDROCHLORIDE 10 MG: 10 TABLET ORAL at 08:11

## 2022-11-08 RX ADMIN — ACETAMINOPHEN 650 MG: 325 TABLET ORAL at 05:11

## 2022-11-08 RX ADMIN — OXYCODONE 5 MG: 5 TABLET ORAL at 05:11

## 2022-11-08 RX ADMIN — FAMOTIDINE 20 MG: 20 TABLET ORAL at 10:11

## 2022-11-08 RX ADMIN — OXYCODONE 5 MG: 5 TABLET ORAL at 11:11

## 2022-11-08 RX ADMIN — SODIUM BICARBONATE 650 MG TABLET 650 MG: at 10:11

## 2022-11-08 RX ADMIN — VENLAFAXINE HYDROCHLORIDE 37.5 MG: 37.5 CAPSULE, EXTENDED RELEASE ORAL at 10:11

## 2022-11-08 RX ADMIN — CARVEDILOL 12.5 MG: 12.5 TABLET, FILM COATED ORAL at 12:11

## 2022-11-08 NOTE — PLAN OF CARE
Pt AOX4, VSS, afebrile.   No c/o 5/10 pain- relieved with scheduled tylenol   Telemonitor NS-ST HR 100s-110s  Wound care provided.  SINAN drain with SS output- documented per flowsheets  BMx2 overnight  Mother at bedside attentive to pt  Pt pulled self meds 100%  Pt up to toilet with standby assist- pink urine noted- pt on period at this time  Pt in lowest settings, call light w/in reach, nonskid socks when ambulating, remains free from falls. NAD. WCTM.

## 2022-11-08 NOTE — NURSING
SANFORD Roberts NP notified of manual bp 172/ 88, will give scheduled Coreg dose and recheck bp at 1700..

## 2022-11-08 NOTE — NURSING
Esperanza Mccord NP notified of elevated bp ( also notified of earlier elevated bps), order to give additional dose Coreg . Will recheck bp in an hour and notify with results.

## 2022-11-08 NOTE — PT/OT/SLP PROGRESS
Physical Therapy Missed Treatment Note      Patient Name:  Isabela Read   MRN:  [id number]  Admitting Diagnosis:  Status post simultaneous kidney and pancreas transplant   Recent Surgery: Procedure(s) (LRB):  TRANSPLANT, KIDNEY (Right)  TRANSPLANT, PANCREAS (N/A) 6 Days Post-Op  Admit Date: 11/2/2022  Length of Stay: 6 days    Patient not seen today. 1st attempt at 1025: pt getting drain pulled fran bedside 2nd attempt at ~1430: pt politely declined reporting d/c later today. stated she showered and ambulated earlier and has no concerns about mobility prior to d/c. Isabela Read's plan of care and PT goals reviewed on this date and remain appropriate. Will follow-up for progressive mobility pending continued medical stability and patient participation.    Meghan Nascimento, PT, DPT  11/8/2022  Pager: [phone]

## 2022-11-08 NOTE — SUBJECTIVE & OBJECTIVE
"  Subjective:   History of Present Illness:  Ms. Read is a 27 y.o. year old  female with ESRD secondary to diabetic nephropathy and HTN.  She has been on the wait list for a kidney transplant at [facility] since 2021. Patient is currently on hemodialysis started on 21. Patient is dialyzing on TTS schedule, last HD 22. Patient reports that she is tolerating dialysis well. She has a LUE AV fistula. She is dialyzing 4 hours per session. Dry weight 61 kg. She reports urinating usually once per day ("normal" amount per urination)    She now presents as a direct admit as primary candidate for a  donor kidney/pancreas transplant. OR times have not been established at this time. Pt is currently not NPO. Dr. Rodriges will be the primary surgeon. Thymo induction. Pre-op labs and imaging have been ordered and will be reviewed prior to transplant. Endocrine consulted to assist with blood sugar management.    Hospital Course:  Underwent combined kidney and pancreas transplant 2022 which was uneventful. Sent to ICU for recovery, extubated.  Immediately making urine. Insulin discontinued within several hours of arrival to ICU. Stepped down to TSU POD#1. Diet advanced .     Interval History: no acute events overnight. Progressing well. Reports little sleep due to frequent trips to the bathroom to urinate and have BM. Cr 1.0, excellent uop. Able to void able dc removed yesterday. Panc enzymes remain slightly elevated. Panc US  stable. Amylase from drain sent yesterday due to high drain output, resulted 204. Tolerating diet, no N/V, (+) BM, change bowel regimen to PRN. BP elevated overnight. Pain well controlled. Encourage ambulation and OOBTC during the day.     Past Medical, Surgical, Family, and Social History:   Unchanged from H&P.    Scheduled Meds:   acetaminophen  650 mg Oral Q6H    aspirin  81 mg Oral Daily    carvediloL  12.5 mg Oral BID WM    ergocalciferol  50,000 Units " Oral Q7 Days    famotidine  20 mg Oral Daily    fluconazole  200 mg Oral Daily    heparin (porcine)  5,000 Units Subcutaneous TID    mycophenolate  1,000 mg Oral BID    NIFEdipine  30 mg Oral Daily    [START ON 11/10/2022] nystatin  500,000 Units Mouth/Throat QID    predniSONE  20 mg Oral Daily    sodium bicarbonate  650 mg Oral BID    [START ON 11/13/2022] sulfamethoxazole-trimethoprim 400-80mg  1 tablet Oral Daily AM    tacrolimus  4 mg Oral BID    [START ON 11/13/2022] valGANciclovir  450 mg Oral Daily    venlafaxine  37.5 mg Oral Daily     Continuous Infusions:  PRN Meds:sodium chloride 0.9%, bisacodyL, dextrose 10%, dextrose 10%, docusate sodium, hydrALAZINE, labetalol, melatonin, naloxone, oxyCODONE, oxyCODONE, polyethylene glycol    Intake/Output - Last 3 Shifts         11/06 0700 11/07 0659 11/07 0700 11/08 0659 11/08 0700 11/09 0659    P.O. 1460 1300     I.V. (mL/kg) 0 (0)      Other 0      Total Intake(mL/kg) 1460 (20.1) 1300 (17.9)     Urine (mL/kg/hr) 2050 (1.2) 2550 (1.5)     Emesis/NG output 0      Drains 690 520     Other 0      Stool 0 0     Blood 0      Total Output 2740 3070     Net -1280 -1770            Urine Occurrence 0 x      Stool Occurrence 0 x 2 x     Emesis Occurrence 0 x               Review of Systems   Constitutional: Negative.  Negative for appetite change, chills, fatigue and fever.   HENT: Negative.     Respiratory:  Negative for cough, chest tightness and shortness of breath.    Cardiovascular:  Negative for chest pain, palpitations and leg swelling.   Gastrointestinal:  Positive for abdominal pain. Negative for abdominal distention, constipation, diarrhea, nausea and vomiting.   Genitourinary:  Negative for difficulty urinating, dysuria, frequency and urgency.   Musculoskeletal:  Negative for back pain and neck pain.   Skin:  Negative for color change, pallor, rash and wound.   Allergic/Immunologic: Positive for immunocompromised state.   Neurological: Negative.  Negative for  "dizziness, weakness and headaches.   Psychiatric/Behavioral:  Negative for confusion and sleep disturbance.     Objective:     Vital Signs (Most Recent):  Temp: 98.6 °F (37 °C) (11/08/22 0448)  Pulse: 101 (11/08/22 0756)  Resp: 16 (11/08/22 0545)  BP: 132/73 (11/08/22 0448)  SpO2: 100 % (11/08/22 0448) Vital Signs (24h Range):  Temp:  [98.3 °F (36.8 °C)-98.6 °F (37 °C)] 98.6 °F (37 °C)  Pulse:  [] 101  Resp:  [16-20] 16  SpO2:  [96 %-100 %] 100 %  BP: (129-209)/(60-99) 132/73     Weight: 72.8 kg (160 lb 7.9 oz)  Height: 5' 4" (162.6 cm)  Body mass index is 27.55 kg/m².    Physical Exam  Constitutional:       General: She is not in acute distress.  Cardiovascular:      Rate and Rhythm: Normal rate and regular rhythm.   Pulmonary:      Effort: Pulmonary effort is normal. No respiratory distress.      Breath sounds: Normal breath sounds.   Abdominal:      General: Bowel sounds are normal.      Palpations: Abdomen is soft. There is no mass.      Tenderness: There is abdominal tenderness.          Comments: Midline skin incision with intact skin staples  SINAN drain x1 in place with thin serosanguinous output   Musculoskeletal:         General: Normal range of motion.   Skin:     General: Skin is warm and dry.      Capillary Refill: Capillary refill takes less than 2 seconds.   Neurological:      General: No focal deficit present.      Mental Status: She is oriented to person, place, and time. Mental status is at baseline.   Psychiatric:         Mood and Affect: Mood normal.         Behavior: Behavior normal.         Thought Content: Thought content normal.       Laboratory:  CBC:   Recent Labs   Lab 11/06/22  0532 11/07/22  0556 11/08/22  0549   WBC 5.69 7.79 7.85   RBC 2.64* 2.59* 2.72*   HGB 8.5* 8.2* 8.9*   HCT 25.2* 24.7* 26.1*    177 204   MCV 96 95 96   MCH 32.2* 31.7* 32.7*   MCHC 33.7 33.2 34.1     CMP:   Recent Labs   Lab 11/02/22  0038 11/02/22  1419 11/06/22  0532 11/07/22  0556 11/08/22  0549   JAY " 137*   < > 123* 108 90   CALCIUM 9.2   < > 8.6* 9.0 9.0   ALBUMIN 4.0   < > 4.2 3.9 3.5   PROT 8.5*  --   --   --   --    *   < > 136 138 138   K 4.8   < > 3.4* 3.5 4.2   CO2 26   < > 20* 23 23   CL 93*   < > 101 106 108   BUN 40*   < > 58* 48* 29*   CREATININE 6.5*   < > 3.3* 1.7* 1.0   ALKPHOS 73  --   --   --   --    ALT 16  --   --   --   --    AST 19  --   --   --   --     < > = values in this interval not displayed.     Coagulation:   Recent Labs   Lab 11/02/22  0038   APTT 27.2     Labs within the past 24 hours have been reviewed.    Diagnostic Results:  US - Kidney: Results for orders placed during the hospital encounter of 11/02/22    US Transplant Kidney With Doppler    Narrative  EXAMINATION:  US TRANSPLANT KIDNEY WITH DOPPLER    CLINICAL HISTORY:  cessation of urine post kidney/pancreas transplant;    TECHNIQUE:  Real time gray scale and doppler ultrasound was performed over the patient's renal allograft.    COMPARISON:  Kidney transplant Doppler ultrasound 11/04/2022.    FINDINGS:  Renal allograft in the right lower quadrant.  The allograft measures 10.8 cm, previously 10.6 cm.  Normal perfusion. No hydronephrosis.  There is a ureteral stent.    Similar size of peritransplant fluid collection measuring 1.4 x 4.1 x 2.5 cm (previously 1.8 x 3.0 x 3.2 cm).    Vasculature:    Resistive indices:    Intralobar: 0.69, previously 0.76.    Upper segmental: 0.74, previously 0.74.    Mid segmental: 0.75, previously 0.74.    Lower segmental: 0.70, previously 0.69.    Main renal artery peak systolic velocity: 287cm/sec with normal waveform, previously 384 centimeter/second..    Renal artery/iliac ratio: 2.1.    The main renal vein is patent.    Bladder is decompressed around a Moncada catheter and poorly visualized.    Impression  1. Interval improvement of elevated peak systolic velocity of the main renal artery.  2. Mild decrease in the resistive index in the intralobar renal artery.  Remainder of resistive  indices are similar to ultrasound from 11/04/2022.  3. Similar size of peritransplant fluid collection.    Electronically signed by resident: Palmira Lewis  Date:    11/05/2022  Time:    18:11    Electronically signed by: Adrian Knox MD  Date:    11/05/2022  Time:    18:46    US - Pancreas: Results for orders placed during the hospital encounter of 11/02/22    US Pancreas Transplant Post    Narrative  EXAMINATION:  US DOPPLER PANCREAS TRANSPLANT POST (XPD)    CLINICAL HISTORY:  s/p kidney/panc txp. panc enzymes mildly elevated;    TECHNIQUE:  Grayscale, color flow, and spectral analysis was used to evaluate the pancreas allograft using transabdominal ultrasound technique.    COMPARISON:  Pancreas transplant ultrasound: 11/03/2022.    FINDINGS:  Pancreatic transplant on 11/03/2022.    Pancreatic allograft is homogeneous in echogenicity.  There are no peripancreatic fluid collections.    VELOCITIES:    Conduit:245 (previously 310) cm/s    Splenic artery:36 (previously 42) cm/s    SMA of Y graft: 32 (previously 36) cm/s.    Splenic vein:25 (previously 31) cm/s    Portal vein:22 (previously 48) cm/s    Superior mesenteric vein:32 (previously 79) cm/s    Iliac artery:252 (previously 253) cm/s    RESISTIVE INDICES:    Head: 0.67 (previously 0.50).    Body: 0.56 (previously 0.59).    Tail: 0.56 (previously 0.54).    Impression  Satisfactory Doppler evaluation the pancreatic allograft, as above.    Electronically signed by resident: Jimmie Fuentes  Date:    11/07/2022  Time:    13:29    Electronically signed by: Jim Cedeño MD  Date:    11/07/2022  Time:    14:29

## 2022-11-08 NOTE — PROGRESS NOTES
DISCHARGE NOTE:    Isabela Read is a 27 y.o. 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      Past Medical History:   Diagnosis Date    Acute kidney injury superimposed on chronic kidney disease 4/7/2021    Anemia     Chronic hypertension with exacerbation during pregnancy in second trimester 11/6/2020    Current regimen (11/6/20):  - Carvedilol 12.5 mg BID - Nifedipine 30 mg daily Baseline CKD + proteinuria    Chronic kidney disease     Depression     Diabetes mellitus     Diabetic retinopathy     Diarrhea     Encounter for blood transfusion     End stage renal failure on dialysis     Gastroparesis     Glaucoma     Hepatomegaly 4/29/2021    Hx of psychiatric care     Hyperlipidemia     Hypertension     Nephrotic syndrome     Nephrotic syndrome 3/4/2021    Palpitations     Poor fetal growth affecting management of mother in second trimester 10/15/2020    Restrictive lung disease     Retinal detachment     Severe pre-eclampsia in second trimester 11/6/2020    Type 1 diabetes mellitus with end-stage renal disease (ESRD) 2/24/2015    Followed by Dr. Mayo.  Last A1C was 13  Currently on lantus 20 units qAM and humolog 10 units with meals  - a fetal echocardiogram around 22-24 weeks - needs eye exam, podiatry exam  - needs EKG, maternal echo and fu with cardiology  - ASA 81mg, folic acid 4mg PO daily - hemoglobin A1c every 4-6 weeks -24 hour urine for protein and creatinine clearance should be performed. Patient will also need a       Hospital Course: Patient's hospital course uncomplicated and patient ready for discharge.    Allergies: Review of patient's allergies indicates:  No Known Allergies    Patient Pharmacy: [facility]    Discharge Medications:     Medication List        START taking these medications      ergocalciferol 50,000 unit Cap  Commonly known as: ERGOCALCIFEROL  Take 1 capsule (50,000 Units  total) by mouth every 7 days.  Start taking on: November 11, 2022     famotidine 20 MG tablet  Commonly known as: PEPCID  Take 1 tablet (20 mg total) by mouth once daily. Stop 11/27/22 for 19 days     k phos di & mono-sod phos mono 250 mg Tab  Commonly known as: K-PHOS-NEUTRAL  Take 2 tablets by mouth 2 (two) times a day.     multivitamin per tablet  Commonly known as: THERAGRAN  Take 1 tablet by mouth once daily.     mycophenolate 250 mg Cap  Commonly known as: CELLCEPT  Take 4 capsules (1,000 mg total) by mouth 2 (two) times daily.     nystatin 100,000 unit/mL suspension  Commonly known as: MYCOSTATIN  Take 5 mLs (500,000 Units total) by mouth 2 hours after meals and at bedtime. for 21 days     ondansetron 8 MG Tbdl  Commonly known as: ZOFRAN-ODT  DISSOLVE 1 tablet (8 mg total) by mouth every 8 (eight) hours as needed (nausea).     oxyCODONE 10 mg Tab immediate release tablet  Commonly known as: ROXICODONE  Take 0.5-1 tablets (5-10 mg total) by mouth every 4 (four) hours as needed for Pain.     predniSONE 5 MG tablet  Commonly known as: DELTASONE  From 11/6 to 11/12 take 20mg by mouth once daily;  From 11/13 to 11/19 take 15mg daily;  From 11/20 to 11/26 take 10mg daily;  From 11/27/22 start 5mg daily thereafter     sulfamethoxazole-trimethoprim 400-80mg 400-80 mg per tablet  Commonly known as: BACTRIM,SEPTRA  Take 1 tablet by mouth once daily. Stop 5/2/2023     tacrolimus 1 MG Cap  Commonly known as: PROGRAF  Take 6 capsules (6 mg total) by mouth every 12 (twelve) hours.     valGANciclovir 450 mg Tab  Commonly known as: VALCYTE  Take 2 tablets (900 mg total) by mouth once daily. Stop 2/2/2023            CHANGE how you take these medications      NIFEdipine 30 MG (OSM) 24 hr tablet  Commonly known as: PROCARDIA-XL  Take 1 tablet (30 mg total) by mouth 2 (two) times a day.  What changed:   medication strength  how much to take            CONTINUE taking these medications      aspirin 81 MG EC tablet  Commonly known  "as: ECOTRIN     carvediloL 25 MG tablet  Commonly known as: COREG     rosuvastatin 10 MG tablet  Commonly known as: CRESTOR  Take 1 tablet (10 mg total) by mouth every evening.     venlafaxine 37.5 MG 24 hr capsule  Commonly known as: EFFEXOR XR  Take 1 capsule (37.5 mg total) by mouth once daily.            STOP taking these medications      blood sugar diagnostic Strp     calcitRIOL 0.5 MCG Cap  Commonly known as: ROCALTROL     fluticasone propionate 50 mcg/actuation nasal spray  Commonly known as: FLONASE     FREESTYLE MARCY 14 DAY READER Misc  Generic drug: flash glucose scanning reader     FREESTYLE MARCY 14 DAY SENSOR Kit  Generic drug: flash glucose sensor     HAIR,SKIN AND NAILS(FA-BIOTIN) ORAL     hydrALAZINE 25 MG tablet  Commonly known as: APRESOLINE     insulin detemir U-100 100 unit/mL (3 mL) Inpn pen  Commonly known as: Levemir FLEXTOUCH     insulin lispro 100 unit/mL pen  Commonly known as: HumaLOG KwikPen Insulin     lancets Misc     medroxyPROGESTERone 150 mg/mL Syrg  Commonly known as: DEPO-PROVERA     pen needle, diabetic 32 gauge x 5/32" Ndle     prednisoLONE acetate 1 % Drps  Commonly known as: PRED FORTE     sevelamer carbonate 800 mg Tab  Commonly known as: RENVELA     traZODone 50 MG tablet  Commonly known as: DESYREL     TYLENOL EXTRA STRENGTH ORAL               Where to Get Your Medications        These medications were sent to [facility] [address] [address]      Hours: Mon-Fri 7a-7p, Sat-Sun 10a-4p Phone: [phone]   ergocalciferol 50,000 unit Cap  famotidine 20 MG tablet  k phos di & mono-sod phos mono 250 mg Tab  multivitamin per tablet  mycophenolate 250 mg Cap  NIFEdipine 30 MG (OSM) 24 hr tablet  nystatin 100,000 unit/mL suspension  ondansetron 8 MG Tbdl  oxyCODONE 10 mg Tab immediate release tablet  predniSONE 5 MG tablet  rosuvastatin 10 MG tablet  sulfamethoxazole-trimethoprim 400-80mg 400-80 mg per tablet  tacrolimus 1 MG " Cap  valGANciclovir 450 mg Tab  venlafaxine 37.5 MG 24 hr capsule          Pharmacy Interventions/Recommendations:  1) Transplant Immunosuppression: Induction Thymo, and maintenance Prograf 6/6, MMF 1000mg BID, and Pred taper.    2) Opportunistic Infection prophylaxis: Bactrim, Valcyte, and nystatin    3) Osteoporosis Prevention measures (liver txp): NA    4) Patient Counseling/Education: Demonstrated the use of the BP cuff, thermometer.    5) Follow-Up/Discharge Needs:  Patient needs pill cutter and thermometer,     6) Patient Assistance Information: Patient's LAMED termed, patient to work on over the next month. Patient paid $124 on her CC.    7) The following medications have been placed on HOLD and should be restarted in the outpatient setting (when appropriate): None    Crystal and her caregiver verbalized their understanding and had the opportunity to ask questions.

## 2022-11-08 NOTE — DISCHARGE SUMMARY
"[facility] - [facility]plant Step[facility]  Kidney Transplant  Discharge Summary    Patient Name: Isabela Read  MRN: [id number]  Admission Date: 2022  Hospital Length of Stay: 6 days  Discharge Date and Time:  2022 11:44 AM  Attending Physician: Kumar Rodriges Jr., MD   Discharging Provider: Abby Mccord DNP  Primary Care Provider: Tavo Bhatia MD    HPI:   Ms. Read is a 27 y.o. year old  female with ESRD secondary to diabetic nephropathy and HTN.  She has been on the wait list for a kidney transplant at [facility] since 2021. Patient is currently on hemodialysis started on 21. Patient is dialyzing on TTS schedule, last HD 22. Patient reports that she is tolerating dialysis well. She has a LUE AV fistula. She is dialyzing 4 hours per session. Dry weight 61 kg. She reports urinating usually once per day ("normal" amount per urination)    She now presents as a direct admit as primary candidate for a  donor kidney/pancreas transplant. OR times have not been established at this time. Pt is currently not NPO. Dr. Rodriges will be the primary surgeon. Thymo induction. Pre-op labs and imaging have been ordered and will be reviewed prior to transplant. Endocrine consulted to assist with blood sugar management.      Procedure(s) (LRB):  TRANSPLANT, KIDNEY (Right)  TRANSPLANT, PANCREAS (N/A)     Hospital Course:    Patient is now s/p SPK 11/3/22 (Thymo induction, CMV D-/R+) for T1DM. Surgery without complication. Transferred to TSU POD#1. Diet advanced . Cr trended down to 1.0 on day of discharge, excellent uop. Moncada had to be replaced x 2 post-op for retention. Moncada removed , voiding without difficulty. PVR 50-100ml, patient able to double void. Panc enzymes trended down but Amylase slightly elevated . Fasting BG normal. Panc US  was satisfactory. Drain output was 400-700ml/day. Sent for amylase and resulted 204. Drain dc'd 11.8. Patient is tolerating " diet, no N/V, (+) BM, able to ambulate with minimal to no assistance. Pain well controlled. Patient is stable and ready for discharge today. She will get outpatient labs and clinic appointment 11/9/22. Patient and her mother received post-txp education.       Goals of Care Treatment Preferences:  Code Status: Full Code      Final Active Diagnoses:    Diagnosis Date Noted POA    PRINCIPAL PROBLEM:  Status post simultaneous kidney and pancreas transplant [Z94.0, Z94.83] 11/03/2022 Not Applicable    Long-term use of immunosuppressant medication [Z79.60] 11/07/2022 Not Applicable    Acute blood loss anemia [D62] 11/07/2022 No    Anemia of chronic disease [D63.8] 11/07/2022 Yes    Urinary retention [R33.9] 11/07/2022 No    At risk for opportunistic infections [Z91.89] 11/03/2022 No    Prophylactic immunotherapy [Z29.8] 11/03/2022 Not Applicable    Hypertension, essential [I10]  Yes      Problems Resolved During this Admission:    Diagnosis Date Noted Date Resolved POA    Patient on waiting list for kidney-pancreas transplant [Z76.82] 08/05/2022 11/03/2022 Yes     Chronic    End stage renal failure on dialysis [N18.6, Z99.2]  11/07/2022 Not Applicable    Type 1 diabetes mellitus with end-stage renal disease (ESRD) [E10.22, N18.6] 02/24/2015 11/07/2022 Yes     Chronic       Treatments: see hospital course    Consults (From admission, onward)        Status Ordering Provider     Inpatient consult to Midline team  Once        Provider:  (Not yet assigned)    Completed NICCI TAPIA     Inpatient consult to Registered Dietitian/Nutritionist  Once        Provider:  (Not yet assigned)    Completed RICK SHIN     Inpatient consult to Endocrinology  Once        Provider:  (Not yet assigned)    Completed LILI STEIN          Pending Diagnostic Studies:     Procedure Component Value Units Date/Time    Lipase [[id number]] Collected: 11/04/22 0418    Order Status: Sent Lab Status: In process Updated: 11/04/22  0418    Specimen: Blood     US Pancreas Transplant Post [[id number]]     Order Status: Sent Lab Status: No result         Significant Diagnostic Studies: Labs:   CMP   Recent Labs   Lab 11/07/22  0556 11/08/22  0549    138   K 3.5 4.2    108   CO2 23 23    90   BUN 48* 29*   CREATININE 1.7* 1.0   CALCIUM 9.0 9.0   ALBUMIN 3.9 3.5   ANIONGAP 9 7*   , CBC   Recent Labs   Lab 11/07/22  0556 11/08/22  0549   WBC 7.79 7.85   HGB 8.2* 8.9*   HCT 24.7* 26.1*    204   , INR   Lab Results   Component Value Date    INR 1.1 11/02/2022    INR 1.2 05/08/2022    INR 1.0 05/21/2021    and All labs within the past 24 hours have been reviewed    Discharged Condition: good    Disposition: Home or Self Care    Follow Up: see hospital course    Patient Instructions:      Diet Adult Regular     No dressing needed     Notify your health care provider if you experience any of the following:  temperature >100.4     Notify your health care provider if you experience any of the following:  persistent nausea and vomiting or diarrhea     Notify your health care provider if you experience any of the following:  severe uncontrolled pain     Notify your health care provider if you experience any of the following:  redness, tenderness, or signs of infection (pain, swelling, redness, odor or green/yellow discharge around incision site)     Notify your health care provider if you experience any of the following:  difficulty breathing or increased cough     Notify your health care provider if you experience any of the following:  severe persistent headache     Notify your health care provider if you experience any of the following:  worsening rash     Notify your health care provider if you experience any of the following:  persistent dizziness, light-headedness, or visual disturbances     Notify your health care provider if you experience any of the following:  increased confusion or weakness     Notify your health care  provider if you experience any of the following:   Order Comments: Any other concerning signs and symptoms. If you have not received your scheduled follow up within 24 hours of your discharge or for any questions or concerns, please call [phone] for further assistance.     Activity as tolerated     Medications:  Reconciled Home Medications:      Medication List      START taking these medications    ergocalciferol 50,000 unit Cap  Commonly known as: ERGOCALCIFEROL  Take 1 capsule (50,000 Units total) by mouth every 7 days.  Start taking on: November 11, 2022     famotidine 20 MG tablet  Commonly known as: PEPCID  Take 1 tablet (20 mg total) by mouth once daily. Stop 11/27/22 for 19 days     k phos di & mono-sod phos mono 250 mg Tab  Commonly known as: K-PHOS-NEUTRAL  Take 2 tablets by mouth 2 (two) times a day.     multivitamin per tablet  Commonly known as: THERAGRAN  Take 1 tablet by mouth once daily.     mycophenolate 250 mg Cap  Commonly known as: CELLCEPT  Take 4 capsules (1,000 mg total) by mouth 2 (two) times daily.     nystatin 100,000 unit/mL suspension  Commonly known as: MYCOSTATIN  Take 5 mLs (500,000 Units total) by mouth 2 hours after meals and at bedtime. for 21 days     ondansetron 8 MG Tbdl  Commonly known as: ZOFRAN-ODT  DISSOLVE 1 tablet (8 mg total) by mouth every 8 (eight) hours as needed (nausea).     oxyCODONE 10 mg Tab immediate release tablet  Commonly known as: ROXICODONE  Take 0.5-1 tablets (5-10 mg total) by mouth every 4 (four) hours as needed for Pain.     predniSONE 5 MG tablet  Commonly known as: DELTASONE  From 11/6 to 11/12 take 20mg by mouth once daily;  From 11/13 to 11/19 take 15mg daily;  From 11/20 to 11/26 take 10mg daily;  From 11/27/22 start 5mg daily thereafter     sulfamethoxazole-trimethoprim 400-80mg 400-80 mg per tablet  Commonly known as: BACTRIM,SEPTRA  Take 1 tablet by mouth once daily. Stop 5/2/2023     tacrolimus 1 MG Cap  Commonly known as: PROGRAF  Take 6  "capsules (6 mg total) by mouth every 12 (twelve) hours.     valGANciclovir 450 mg Tab  Commonly known as: VALCYTE  Take 2 tablets (900 mg total) by mouth once daily. Stop 2/2/2023        CHANGE how you take these medications    NIFEdipine 30 MG (OSM) 24 hr tablet  Commonly known as: PROCARDIA-XL  Take 1 tablet (30 mg total) by mouth 2 (two) times a day.  What changed:   · medication strength  · how much to take        CONTINUE taking these medications    aspirin 81 MG EC tablet  Commonly known as: ECOTRIN  Take 81 mg by mouth once daily.     carvediloL 25 MG tablet  Commonly known as: COREG  Take 25 mg by mouth 2 (two) times daily with meals.     rosuvastatin 10 MG tablet  Commonly known as: CRESTOR  Take 1 tablet (10 mg total) by mouth every evening.     venlafaxine 37.5 MG 24 hr capsule  Commonly known as: EFFEXOR XR  Take 1 capsule (37.5 mg total) by mouth once daily.        STOP taking these medications    blood sugar diagnostic Strp     calcitRIOL 0.5 MCG Cap  Commonly known as: ROCALTROL     fluticasone propionate 50 mcg/actuation nasal spray  Commonly known as: FLONASE     FREESTYLE MARCY 14 DAY READER Misc  Generic drug: flash glucose scanning reader     FREESTYLE MARCY 14 DAY SENSOR Kit  Generic drug: flash glucose sensor     HAIR,SKIN AND NAILS(FA-BIOTIN) ORAL     hydrALAZINE 25 MG tablet  Commonly known as: APRESOLINE     insulin detemir U-100 100 unit/mL (3 mL) Inpn pen  Commonly known as: Levemir FLEXTOUCH     insulin lispro 100 unit/mL pen  Commonly known as: HumaLOG KwikPen Insulin     lancets Misc     medroxyPROGESTERone 150 mg/mL Syrg  Commonly known as: DEPO-PROVERA     pen needle, diabetic 32 gauge x 5/32" Ndle     prednisoLONE acetate 1 % Drps  Commonly known as: PRED FORTE     sevelamer carbonate 800 mg Tab  Commonly known as: RENVELA     traZODone 50 MG tablet  Commonly known as: DESYREL     TYLENOL EXTRA STRENGTH ORAL          Time spent caring for patient (Greater than 1/2 spent in direct " face-to-face contact): > 30 minutes    Abby Mccord, KUMAR  Kidney Transplant  [facility] - [facility]

## 2022-11-08 NOTE — PROGRESS NOTES
Transplant Social Work Discharge Note:    Pt will discharge to patient's home under the care of Charo Garcia, patient's mother, phone number [phone].      was informed that patient's medicaid coverage ended on 10/31/22. Per pharmacy patient now only has Medicare part A/B.      spoke with patient who confirmed this. Patient reports that she had been receiving letters from Medicaid to her home in recent weeks but had not been sure what they were for.      collaborated with inpatient and outpatient pharmacy team. Pharmacy team was able to provide patient with limited income NET program to assist with medication costs.     Patient and caregiver agreed to new medication cost approx $124.  educated patient on Medicare open enrollment and encouraged patient to reach out to Medicaid to reinstate her coverage.     Patient denied no other needs at this time. Patient denied any mental health concerns including anxiety and depression. Patient agreed to update transplant team on changes to insurance coverage as they occur.     Patient agreed to contact transplant team with needs, questions or concerns as they arise.     Pt aware of, involved in, and coping well with this discharge plan. Pt did not have any concerns with the discharge plan at this time. SW remains available at [phone].    Makayla Bess LMSW

## 2022-11-08 NOTE — PLAN OF CARE
SSC met with patient/family at bedside. Patient experience rounding completed and reviewed the following.     Do you know your discharge plan? Yes     If yes, what is the plan? Home    If you are discharging home, do you have help at home? Yes,Mom    Do you think you will need help at home at discharge? No    Have you discussed your needs and preferences with your SW/CM? Yes     Assigned SW/CM notified of any patient/family needs or concerns.     Orestes Hennessy  Community Health Worker

## 2022-11-08 NOTE — PLAN OF CARE
Plan of care reviewed on am rounds with patient and mother. Afrebrile, vss tele ST. Bp stable ( lying and sitting ). Standing bp readings high in comparison to other positions. Midline healing with staples intact, reinstructed on wound care. SINAN with mod amount serosang output ( fluid sent for amylase/ Cr) Inciisonal pain controlled with prn Oxy/ scheduled Tylenol. Patient reviewed self meds/ updated card and able to pull meds without difficulty.Voiding OK since dc dcd. Kidney u/s completed.No bm yet but passing flatus.Supportive/ attentive mother at bs, emotional support provided to both

## 2022-11-08 NOTE — ASSESSMENT & PLAN NOTE
"- S/p SPK 11/3/22. Surgery without complication  - Cr trending down, excellent uop. Moncada replaced x 2. See "urinary retention"  - POD#1 kidney/panc US 11/3 stable  - Panc enzymes trended down, slightly elevated 11/7   - Panc US 11/8 stable  - Drain with 400-700 ml/day. Sent for amylase, resulted 204.   - Diet advanced to regular 11/5.   - Tolerating diet, no N/V, (+) BM. Pain well controlled.   "

## 2022-11-09 VITALS
TEMPERATURE: 99 F | RESPIRATION RATE: 18 BRPM | BODY MASS INDEX: 25.28 KG/M2 | OXYGEN SATURATION: 100 % | HEIGHT: 64 IN | DIASTOLIC BLOOD PRESSURE: 69 MMHG | WEIGHT: 148.06 LBS | HEART RATE: 104 BPM | SYSTOLIC BLOOD PRESSURE: 153 MMHG

## 2022-11-09 LAB
ALBUMIN SERPL BCP-MCNC: 3.5 G/DL (ref 3.5–5.2)
AMYLASE SERPL-CCNC: 93 U/L (ref 20–110)
ANION GAP SERPL CALC-SCNC: 9 MMOL/L (ref 8–16)
BASOPHILS # BLD AUTO: 0.01 K/UL (ref 0–0.2)
BASOPHILS NFR BLD: 0.1 % (ref 0–1.9)
BUN SERPL-MCNC: 17 MG/DL (ref 6–20)
CALCIUM SERPL-MCNC: 9.4 MG/DL (ref 8.7–10.5)
CHLORIDE SERPL-SCNC: 109 MMOL/L (ref 95–110)
CO2 SERPL-SCNC: 18 MMOL/L (ref 23–29)
CREAT SERPL-MCNC: 0.9 MG/DL (ref 0.5–1.4)
DIFFERENTIAL METHOD: ABNORMAL
EOSINOPHIL # BLD AUTO: 0.1 K/UL (ref 0–0.5)
EOSINOPHIL NFR BLD: 1.1 % (ref 0–8)
ERYTHROCYTE [DISTWIDTH] IN BLOOD BY AUTOMATED COUNT: 14.5 % (ref 11.5–14.5)
EST. GFR  (NO RACE VARIABLE): >60 ML/MIN/1.73 M^2
GLUCOSE SERPL-MCNC: 82 MG/DL (ref 70–110)
HCT VFR BLD AUTO: 28.2 % (ref 37–48.5)
HGB BLD-MCNC: 9.3 G/DL (ref 12–16)
IMM GRANULOCYTES # BLD AUTO: 0.13 K/UL (ref 0–0.04)
IMM GRANULOCYTES NFR BLD AUTO: 1.4 % (ref 0–0.5)
LIPASE SERPL-CCNC: 29 U/L (ref 4–60)
LYMPHOCYTES # BLD AUTO: 1.6 K/UL (ref 1–4.8)
LYMPHOCYTES NFR BLD: 16.8 % (ref 18–48)
MAGNESIUM SERPL-MCNC: 1.6 MG/DL (ref 1.6–2.6)
MCH RBC QN AUTO: 31.8 PG (ref 27–31)
MCHC RBC AUTO-ENTMCNC: 33 G/DL (ref 32–36)
MCV RBC AUTO: 97 FL (ref 82–98)
MONOCYTES # BLD AUTO: 0.7 K/UL (ref 0.3–1)
MONOCYTES NFR BLD: 7.5 % (ref 4–15)
NEUTROPHILS # BLD AUTO: 6.9 K/UL (ref 1.8–7.7)
NEUTROPHILS NFR BLD: 73.1 % (ref 38–73)
NRBC BLD-RTO: 0 /100 WBC
PHOSPHATE SERPL-MCNC: 1.7 MG/DL (ref 2.7–4.5)
PHOSPHATE SERPL-MCNC: 1.7 MG/DL (ref 2.7–4.5)
PLATELET # BLD AUTO: 265 K/UL (ref 150–450)
PMV BLD AUTO: 12 FL (ref 9.2–12.9)
POCT GLUCOSE: 100 MG/DL (ref 70–110)
POCT GLUCOSE: 105 MG/DL (ref 70–110)
POCT GLUCOSE: 108 MG/DL (ref 70–110)
POCT GLUCOSE: 111 MG/DL (ref 70–110)
POCT GLUCOSE: 113 MG/DL (ref 70–110)
POCT GLUCOSE: 120 MG/DL (ref 70–110)
POCT GLUCOSE: 77 MG/DL (ref 70–110)
POCT GLUCOSE: 78 MG/DL (ref 70–110)
POCT GLUCOSE: 80 MG/DL (ref 70–110)
POCT GLUCOSE: 82 MG/DL (ref 70–110)
POCT GLUCOSE: 90 MG/DL (ref 70–110)
POCT GLUCOSE: 91 MG/DL (ref 70–110)
POCT GLUCOSE: 91 MG/DL (ref 70–110)
POCT GLUCOSE: 94 MG/DL (ref 70–110)
POCT GLUCOSE: 96 MG/DL (ref 70–110)
POCT GLUCOSE: 97 MG/DL (ref 70–110)
POCT GLUCOSE: 98 MG/DL (ref 70–110)
POCT GLUCOSE: 99 MG/DL (ref 70–110)
POTASSIUM SERPL-SCNC: 4.2 MMOL/L (ref 3.5–5.1)
RBC # BLD AUTO: 2.92 M/UL (ref 4–5.4)
SODIUM SERPL-SCNC: 136 MMOL/L (ref 136–145)
TACROLIMUS BLD-MCNC: 9 NG/ML (ref 5–15)
WBC # BLD AUTO: 9.42 K/UL (ref 3.9–12.7)

## 2022-11-09 PROCEDURE — 63600175 PHARM REV CODE 636 W HCPCS: Performed by: NURSE PRACTITIONER

## 2022-11-09 PROCEDURE — 25000003 PHARM REV CODE 250: Performed by: STUDENT IN AN ORGANIZED HEALTH CARE EDUCATION/TRAINING PROGRAM

## 2022-11-09 PROCEDURE — 99239 HOSP IP/OBS DSCHRG MGMT >30: CPT | Mod: 24,,, | Performed by: SURGERY

## 2022-11-09 PROCEDURE — 25000003 PHARM REV CODE 250: Performed by: INTERNAL MEDICINE

## 2022-11-09 PROCEDURE — 36415 COLL VENOUS BLD VENIPUNCTURE: CPT | Performed by: STUDENT IN AN ORGANIZED HEALTH CARE EDUCATION/TRAINING PROGRAM

## 2022-11-09 PROCEDURE — 85025 COMPLETE CBC W/AUTO DIFF WBC: CPT | Performed by: STUDENT IN AN ORGANIZED HEALTH CARE EDUCATION/TRAINING PROGRAM

## 2022-11-09 PROCEDURE — 83735 ASSAY OF MAGNESIUM: CPT | Performed by: PHYSICIAN ASSISTANT

## 2022-11-09 PROCEDURE — 25000003 PHARM REV CODE 250: Performed by: TRANSPLANT SURGERY

## 2022-11-09 PROCEDURE — 63600175 PHARM REV CODE 636 W HCPCS: Performed by: STUDENT IN AN ORGANIZED HEALTH CARE EDUCATION/TRAINING PROGRAM

## 2022-11-09 PROCEDURE — 80069 RENAL FUNCTION PANEL: CPT | Performed by: STUDENT IN AN ORGANIZED HEALTH CARE EDUCATION/TRAINING PROGRAM

## 2022-11-09 PROCEDURE — 83690 ASSAY OF LIPASE: CPT | Performed by: STUDENT IN AN ORGANIZED HEALTH CARE EDUCATION/TRAINING PROGRAM

## 2022-11-09 PROCEDURE — 25000003 PHARM REV CODE 250: Performed by: NURSE PRACTITIONER

## 2022-11-09 PROCEDURE — 82150 ASSAY OF AMYLASE: CPT | Performed by: STUDENT IN AN ORGANIZED HEALTH CARE EDUCATION/TRAINING PROGRAM

## 2022-11-09 PROCEDURE — 80197 ASSAY OF TACROLIMUS: CPT | Performed by: STUDENT IN AN ORGANIZED HEALTH CARE EDUCATION/TRAINING PROGRAM

## 2022-11-09 PROCEDURE — 99239 PR HOSPITAL DISCHARGE DAY,>30 MIN: ICD-10-PCS | Mod: 24,,, | Performed by: SURGERY

## 2022-11-09 RX ORDER — SODIUM BICARBONATE 650 MG/1
1300 TABLET ORAL 2 TIMES DAILY
Qty: 120 TABLET | Refills: 11 | Status: SHIPPED | OUTPATIENT
Start: 2022-11-09 | End: 2022-11-14 | Stop reason: DRUGHIGH

## 2022-11-09 RX ORDER — SODIUM BICARBONATE 650 MG/1
1300 TABLET ORAL 2 TIMES DAILY
Status: DISCONTINUED | OUTPATIENT
Start: 2022-11-09 | End: 2022-11-09 | Stop reason: HOSPADM

## 2022-11-09 RX ORDER — NIFEDIPINE 30 MG/1
30 TABLET, EXTENDED RELEASE ORAL ONCE
Status: COMPLETED | OUTPATIENT
Start: 2022-11-09 | End: 2022-11-09

## 2022-11-09 RX ORDER — NIFEDIPINE 30 MG/1
60 TABLET, EXTENDED RELEASE ORAL 2 TIMES DAILY
Status: DISCONTINUED | OUTPATIENT
Start: 2022-11-09 | End: 2022-11-09 | Stop reason: HOSPADM

## 2022-11-09 RX ORDER — NIFEDIPINE 60 MG/1
60 TABLET, EXTENDED RELEASE ORAL 2 TIMES DAILY
Qty: 60 TABLET | Refills: 11 | Status: ON HOLD | OUTPATIENT
Start: 2022-11-09 | End: 2022-11-28 | Stop reason: SDUPTHER

## 2022-11-09 RX ADMIN — SODIUM BICARBONATE 650 MG TABLET 650 MG: at 08:11

## 2022-11-09 RX ADMIN — NIFEDIPINE 30 MG: 30 TABLET, FILM COATED, EXTENDED RELEASE ORAL at 10:11

## 2022-11-09 RX ADMIN — MYCOPHENOLATE MOFETIL 1000 MG: 250 CAPSULE ORAL at 08:11

## 2022-11-09 RX ADMIN — FAMOTIDINE 20 MG: 20 TABLET ORAL at 08:11

## 2022-11-09 RX ADMIN — PREDNISONE 20 MG: 20 TABLET ORAL at 08:11

## 2022-11-09 RX ADMIN — TACROLIMUS 6 MG: 1 CAPSULE ORAL at 08:11

## 2022-11-09 RX ADMIN — FLUCONAZOLE 200 MG: 200 TABLET ORAL at 08:11

## 2022-11-09 RX ADMIN — NIFEDIPINE 30 MG: 30 TABLET, FILM COATED, EXTENDED RELEASE ORAL at 08:11

## 2022-11-09 RX ADMIN — CARVEDILOL 25 MG: 25 TABLET, FILM COATED ORAL at 08:11

## 2022-11-09 RX ADMIN — ASPIRIN 81 MG CHEWABLE TABLET 81 MG: 81 TABLET CHEWABLE at 08:11

## 2022-11-09 RX ADMIN — OXYCODONE HYDROCHLORIDE 10 MG: 10 TABLET ORAL at 08:11

## 2022-11-09 RX ADMIN — VENLAFAXINE HYDROCHLORIDE 37.5 MG: 37.5 CAPSULE, EXTENDED RELEASE ORAL at 08:11

## 2022-11-09 RX ADMIN — HEPARIN SODIUM 5000 UNITS: 5000 INJECTION INTRAVENOUS; SUBCUTANEOUS at 08:11

## 2022-11-09 RX ADMIN — DIBASIC SODIUM PHOSPHATE, MONOBASIC POTASSIUM PHOSPHATE AND MONOBASIC SODIUM PHOSPHATE 2 TABLET: 852; 155; 130 TABLET ORAL at 08:11

## 2022-11-09 RX ADMIN — OXYCODONE 5 MG: 5 TABLET ORAL at 04:11

## 2022-11-09 NOTE — PROGRESS NOTES
"[facility] - [facility]  Kidney Transplant  Progress Note      Reason for Follow-up: Reassessment of Kidney, Pancreas Transplant - 11/3/2022  (#1) recipient and management of immunosuppression.    ORGAN:  RIGHT KIDNEY   Donor Type:  Donation after Brain Death   Donor CMV Status: Negative  Donor HBcAB:Negative  Donor HCV Status:Negative  Donor HBV KEE: Negative  Donor HCV KEE: Negative      Subjective:   History of Present Illness:  Ms. Read is a 27 y.o. year old  female with ESRD secondary to diabetic nephropathy and HTN.  She has been on the wait list for a kidney transplant at [facility] since 2021. Patient is currently on hemodialysis started on 21. Patient is dialyzing on TTS schedule, last HD 22. Patient reports that she is tolerating dialysis well. She has a LUE AV fistula. She is dialyzing 4 hours per session. Dry weight 61 kg. She reports urinating usually once per day ("normal" amount per urination)    She now presents as a direct admit as primary candidate for a  donor kidney/pancreas transplant. OR times have not been established at this time. Pt is currently not NPO. Dr. Rodriges will be the primary surgeon. Thymo induction. Pre-op labs and imaging have been ordered and will be reviewed prior to transplant. Endocrine consulted to assist with blood sugar management.    Hospital Course:  Underwent combined kidney and pancreas transplant 2022 which was uneventful. Sent to ICU for recovery, extubated.  Immediately making urine. Insulin discontinued within several hours of arrival to ICU. Stepped down to TSU POD#1. Diet advanced .     Interval History: no acute events overnight. Progressing well. Reports little sleep due to frequent trips to the bathroom to urinate and have BM. Cr 1.0, excellent uop. Able to void able dc removed yesterday. Panc enzymes remain slightly elevated. Panc US  stable. Amylase from drain sent yesterday due to high drain " output, resulted 204. DC drain today. Tolerating diet, no N/V, (+) BM, change bowel regimen to PRN. BP elevated, adjusted anti-hypertensives. Pain well controlled. Encourage ambulation and OOBTC during the day.     SINAN drain removal: Site cleaned with alcohol, 1% lidocaine used for local anesthestic.  3-0 prolene used for suture.  Drain tip intact.  Patient tolerated procedure well.  Will continue to monitor for any complications.      Past Medical, Surgical, Family, and Social History:   Unchanged from H&P.    Scheduled Meds:   acetaminophen  650 mg Oral Q6H    aspirin  81 mg Oral Daily    carvediloL  25 mg Oral BID WM    ergocalciferol  50,000 Units Oral Q7 Days    famotidine  20 mg Oral Daily    fluconazole  200 mg Oral Daily    heparin (porcine)  5,000 Units Subcutaneous TID    k phos di & mono-sod phos mono  500 mg Oral BID    mycophenolate  1,000 mg Oral BID    NIFEdipine  30 mg Oral BID    [START ON 11/10/2022] nystatin  500,000 Units Mouth/Throat QID    predniSONE  20 mg Oral Daily    sodium bicarbonate  650 mg Oral BID    [START ON 11/13/2022] sulfamethoxazole-trimethoprim 400-80mg  1 tablet Oral Daily AM    tacrolimus  6 mg Oral BID    [START ON 11/13/2022] valGANciclovir  450 mg Oral Daily    venlafaxine  37.5 mg Oral Daily     Continuous Infusions:  PRN Meds:sodium chloride 0.9%, bisacodyL, dextrose 10%, dextrose 10%, docusate sodium, hydrALAZINE, labetalol, melatonin, naloxone, oxyCODONE, oxyCODONE, polyethylene glycol    Intake/Output - Last 3 Shifts         11/07 0700  11/08 0659 11/08 0700  11/09 0659 11/09 0700  11/10 0659    P.O. 1300 2230     I.V. (mL/kg)  0 (0)     Other  0     IV Piggyback  250     Total Intake(mL/kg) 1300 (17.9) 2480 (37)     Urine (mL/kg/hr) 2550 (1.5) 3100 (1.9)     Emesis/NG output  0     Drains 520      Other  0     Stool 0 0     Blood  0     Total Output 3070 3100     Net -1770 -620            Urine Occurrence  0 x     Stool Occurrence 2 x 4 x     Emesis  "Occurrence  0 x              Review of Systems   Constitutional: Negative.  Negative for appetite change, chills, fatigue and fever.   HENT: Negative.     Respiratory:  Negative for cough, chest tightness and shortness of breath.    Cardiovascular:  Negative for chest pain, palpitations and leg swelling.   Gastrointestinal:  Positive for abdominal pain. Negative for abdominal distention, constipation, diarrhea, nausea and vomiting.   Genitourinary:  Negative for difficulty urinating, dysuria, frequency and urgency.   Musculoskeletal:  Negative for back pain and neck pain.   Skin:  Negative for color change, pallor, rash and wound.   Allergic/Immunologic: Positive for immunocompromised state.   Neurological: Negative.  Negative for dizziness, weakness and headaches.   Psychiatric/Behavioral:  Negative for confusion and sleep disturbance.     Objective:     Vital Signs (Most Recent):  Temp: 98.3 °F (36.8 °C) (11/09/22 0400)  Pulse: 100 (11/09/22 0406)  Resp: 18 (11/09/22 0422)  BP: (!) 141/81 (11/09/22 0406)  SpO2: 98 % (11/09/22 0400)   Vital Signs (24h Range):  Temp:  [98.2 °F (36.8 °C)-98.4 °F (36.9 °C)] 98.3 °F (36.8 °C)  Pulse:  [] 100  Resp:  [16-18] 18  SpO2:  [96 %-100 %] 98 %  BP: (141-190)/(70-96) 141/81     Weight: 67.1 kg (148 lb 0.6 oz)  Height: 5' 4" (162.6 cm)  Body mass index is 25.41 kg/m².    Physical Exam  Constitutional:       General: She is not in acute distress.  Cardiovascular:      Rate and Rhythm: Normal rate and regular rhythm.   Pulmonary:      Effort: Pulmonary effort is normal. No respiratory distress.      Breath sounds: Normal breath sounds.   Abdominal:      General: Bowel sounds are normal.      Palpations: Abdomen is soft. There is no mass.      Tenderness: There is abdominal tenderness.          Comments: Midline skin incision with intact skin staples  SINAN drain x1 in place with thin serosanguinous output   Musculoskeletal:         General: Normal range of motion.   Skin:     " General: Skin is warm and dry.      Capillary Refill: Capillary refill takes less than 2 seconds.   Neurological:      General: No focal deficit present.      Mental Status: She is oriented to person, place, and time. Mental status is at baseline.   Psychiatric:         Mood and Affect: Mood normal.         Behavior: Behavior normal.         Thought Content: Thought content normal.       Laboratory:  CBC:   Recent Labs   Lab 11/07/22  0556 11/08/22  0549 11/09/22  0614   WBC 7.79 7.85 9.42   RBC 2.59* 2.72* 2.92*   HGB 8.2* 8.9* 9.3*   HCT 24.7* 26.1* 28.2*    204 265   MCV 95 96 97   MCH 31.7* 32.7* 31.8*   MCHC 33.2 34.1 33.0     CMP:   Recent Labs   Lab 11/07/22  0556 11/08/22  0549 11/09/22  0614    90 82   CALCIUM 9.0 9.0 9.4   ALBUMIN 3.9 3.5 3.5    138 136   K 3.5 4.2 4.2   CO2 23 23 18*    108 109   BUN 48* 29* 17   CREATININE 1.7* 1.0 0.9     Coagulation: No results for input(s): PT, APTT in the last 168 hours.  Labs within the past 24 hours have been reviewed.    Diagnostic Results:  US - Kidney: Results for orders placed during the hospital encounter of 11/02/22    US Transplant Kidney With Doppler    Narrative  EXAMINATION:  US TRANSPLANT KIDNEY WITH DOPPLER    CLINICAL HISTORY:  cessation of urine post kidney/pancreas transplant;    TECHNIQUE:  Real time gray scale and doppler ultrasound was performed over the patient's renal allograft.    COMPARISON:  Kidney transplant Doppler ultrasound 11/04/2022.    FINDINGS:  Renal allograft in the right lower quadrant.  The allograft measures 10.8 cm, previously 10.6 cm.  Normal perfusion. No hydronephrosis.  There is a ureteral stent.    Similar size of peritransplant fluid collection measuring 1.4 x 4.1 x 2.5 cm (previously 1.8 x 3.0 x 3.2 cm).    Vasculature:    Resistive indices:    Intralobar: 0.69, previously 0.76.    Upper segmental: 0.74, previously 0.74.    Mid segmental: 0.75, previously 0.74.    Lower segmental: 0.70,  previously 0.69.    Main renal artery peak systolic velocity: 287cm/sec with normal waveform, previously 384 centimeter/second..    Renal artery/iliac ratio: 2.1.    The main renal vein is patent.    Bladder is decompressed around a Moncada catheter and poorly visualized.    Impression  1. Interval improvement of elevated peak systolic velocity of the main renal artery.  2. Mild decrease in the resistive index in the intralobar renal artery.  Remainder of resistive indices are similar to ultrasound from 11/04/2022.  3. Similar size of peritransplant fluid collection.    Electronically signed by resident: Palmira Lewis  Date:    11/05/2022  Time:    18:11    Electronically signed by: Adrian Knox MD  Date:    11/05/2022  Time:    18:46    US - Pancreas: Results for orders placed during the hospital encounter of 11/02/22    US Pancreas Transplant Post    Narrative  EXAMINATION:  US DOPPLER PANCREAS TRANSPLANT POST (XPD)    CLINICAL HISTORY:  s/p kidney/panc txp. panc enzymes mildly elevated;    TECHNIQUE:  Grayscale, color flow, and spectral analysis was used to evaluate the pancreas allograft using transabdominal ultrasound technique.    COMPARISON:  Pancreas transplant ultrasound: 11/03/2022.    FINDINGS:  Pancreatic transplant on 11/03/2022.    Pancreatic allograft is homogeneous in echogenicity.  There are no peripancreatic fluid collections.    VELOCITIES:    Conduit:245 (previously 310) cm/s    Splenic artery:36 (previously 42) cm/s    SMA of Y graft: 32 (previously 36) cm/s.    Splenic vein:25 (previously 31) cm/s    Portal vein:22 (previously 48) cm/s    Superior mesenteric vein:32 (previously 79) cm/s    Iliac artery:252 (previously 253) cm/s    RESISTIVE INDICES:    Head: 0.67 (previously 0.50).    Body: 0.56 (previously 0.59).    Tail: 0.56 (previously 0.54).    Impression  Satisfactory Doppler evaluation the pancreatic allograft, as above.    Electronically signed by resident: Jimmie  "Gina  Date:    11/07/2022  Time:    13:29    Electronically signed by: Jim Cedeño MD  Date:    11/07/2022  Time:    14:29    Assessment/Plan:     * Status post simultaneous kidney and pancreas transplant  - S/p SPK 11/3/22. Surgery without complication  - Cr trending down, excellent uop. Dc replaced x 2. See "urinary retention"  - POD#1 kidney/panc US 11/3 stable  - Panc enzymes trended down, slightly elevated 11/7   - Panc US 11/8 stable  - Drain with 400-700 ml/day. Sent for amylase, resulted 204. DC drain 11/8.  - Diet advanced to regular 11/5.   - Tolerating diet, no N/V, (+) BM. Pain well controlled.     Urinary retention  - dc replaced x 2 post-op  - Dc dc 11/8  - PVR 90-180ml on 11/8  - voiding without difficulty      Anemia of chronic disease  - chronic  - see acute blood loss anemia    Acute blood loss anemia  - Expected post-op  - H/H stable. Continue to monitor.     Long-term use of immunosuppressant medication  - Maintenance IS with prograf, MMF, and prednisone. cont to check tacrolimus level daily. Assess for toxicity and adjust level as needed      Prophylactic immunotherapy  - See long-term use of immunosuppressant medication    At risk for opportunistic infections  - continue valcyte for CMV prophylaxis for 3 months (CMV D-/R+)  - continue bactrim for PJP prophylaxis for 6 months  - continue nystatin for thrush prevention for 4 weeks  - Bactrim and valcyte to start on POD#10 or at discharge    Hypertension, essential  - continue coreg 12.5mg BID. Increased to 25mg BID on 11/8.  - continue nifedipine 30mg QD. Changed to BID 11/8.         Discharge Planning: not stable for dc at this time.     Abby Mccord, DNP  Kidney Transplant  [facility] - [facility]  "

## 2022-11-09 NOTE — PROGRESS NOTES
Discharge instructions and education given to pt. Pt verbalized understanding. Reviewed medication changes, new medications, future appointments and medication compliance. Pt informed to use Care Coordinator or call 24/7 [facility] on-call nurse for any further questions or concerns. Reviewed worsening signs or symptoms of kidney/pancreas disease. Peripheral IV removed, catheter intact. VISI and telemetry monitors removed. Personal items sent with pt. Pt waiting for wheelchair services to provide transport. Pharmacy came to bedside and provided updated blue card. Vitals signs stable at time of discharge.

## 2022-11-09 NOTE — ASSESSMENT & PLAN NOTE
- continue coreg 12.5mg BID. Increased to 25mg BID on 11/8.  - continue nifedipine 30mg QD. Changed to BID 11/8.

## 2022-11-09 NOTE — PROGRESS NOTES
Transplant Teaching Book given to patient, Isabela Read, on 11/7/2022 During the course of the hospital stay the patient received information regarding kidney transplant. Teaching and instruction were completed.  Areas that were discussed included: how to contact the Transplant Team, the importance of measuring intake of fluids and urine output, and monitoring vital signs such as blood pressure, temperature, and daily weights.  Parameters for which to report abnormal findings were given.  Appointment were provided along with the rational for the importance of lab work and clinic visits.  A written medication list was provided.  The importance of immunosuppressive medications, their common side effects, and treatment to prevent or minimize side effects has been reviewed.  Signs and symptoms of rejection and infection along with various treatments were reviewed.  The need to avoid infection was discussed.  Wound care and special consideration regarding activities of daily living were explained.  Written and verbal teaching of the above information was given.     Discussed with the patient and caregiver the importance of maintaining COVID-19 precautions; wearing a mask, good handwashing, and social distancing.  Also, to report any signs or symptoms (fever, difficulty breathing, loss of taste/smell, etc.), suspected exposure, or COVID testing, immediately to the transplant program.     Education was provided tot he patient and her mother

## 2022-11-09 NOTE — DISCHARGE SUMMARY
"[facility] - Transplant Stepdown  Kidney Transplant  Discharge Summary    Patient Name: Isabela Read  MRN: [id number]  Admission Date: 2022  Hospital Length of Stay: 7 days  Discharge Date and Time:  2022 10:57 AM  Attending Physician: Kumar Rodriges Jr., MD   Discharging Provider: Abby Mccord DNP  Primary Care Provider: Tavo Bhatia MD    HPI:   Ms. Read is a 27 y.o. year old  female with ESRD secondary to diabetic nephropathy and HTN.  She has been on the wait list for a kidney transplant at [facility] since 2021. Patient is currently on hemodialysis started on 21. Patient is dialyzing on TTS schedule, last HD 22. Patient reports that she is tolerating dialysis well. She has a LUE AV fistula. She is dialyzing 4 hours per session. Dry weight 61 kg. She reports urinating usually once per day ("normal" amount per urination)    She now presents as a direct admit as primary candidate for a  donor kidney/pancreas transplant. OR times have not been established at this time. Pt is currently not NPO. Dr. Rodriges will be the primary surgeon. Thymo induction. Pre-op labs and imaging have been ordered and will be reviewed prior to transplant. Endocrine consulted to assist with blood sugar management.      Procedure(s) (LRB):  TRANSPLANT, KIDNEY (Right)  TRANSPLANT, PANCREAS (N/A)     Hospital Course:    Patient is now s/p SPK 11/3/22 (Thymo induction, CMV D-/R+) for T1DM. Surgery without complication. Transferred to TSU POD#1. Diet advanced . Cr trended down to 1.0 on day of discharge, excellent uop. Moncada had to be replaced x 2 post-op for retention. Moncada removed , voiding without difficulty. PVR 50-100ml, patient able to double void. Panc enzymes trended down but Amylase slightly elevated . Fasting BG normal. Panc US  was satisfactory. Drain output was 400-700ml/day. Sent for amylase and resulted 204. Drain dc'd . Patient is tolerating " diet, no N/V, (+) BM, able to ambulate with minimal to no assistance. Pain well controlled. BP elevated, mildly decreased with standing. BP improved with Coreg 25mg BID and nifedipine 60mg BID (increased to 60mg on day of discharge). Patient given hold parameters for BP medications. Patient is stable and ready for discharge today. She will get outpatient labs and clinic appointment 11/9/22. Patient and her mother received post-txp education.     Goals of Care Treatment Preferences:  Code Status: Full Code      Final Active Diagnoses:    Diagnosis Date Noted POA    PRINCIPAL PROBLEM:  Status post simultaneous kidney and pancreas transplant [Z94.0, Z94.83] 11/03/2022 Not Applicable    Long-term use of immunosuppressant medication [Z79.60] 11/07/2022 Not Applicable    Acute blood loss anemia [D62] 11/07/2022 No    Anemia of chronic disease [D63.8] 11/07/2022 Yes    Urinary retention [R33.9] 11/07/2022 No    At risk for opportunistic infections [Z91.89] 11/03/2022 No    Prophylactic immunotherapy [Z29.8] 11/03/2022 Not Applicable    Hypertension, essential [I10]  Yes      Problems Resolved During this Admission:    Diagnosis Date Noted Date Resolved POA    Patient on waiting list for kidney-pancreas transplant [Z76.82] 08/05/2022 11/03/2022 Yes     Chronic    End stage renal failure on dialysis [N18.6, Z99.2]  11/07/2022 Not Applicable    Type 1 diabetes mellitus with end-stage renal disease (ESRD) [E10.22, N18.6] 02/24/2015 11/07/2022 Yes     Chronic       Treatments: see hospital course    Consults (From admission, onward)          Status Ordering Provider     Inpatient consult to Midline team  Once        Provider:  (Not yet assigned)    Completed NICCI TAPIA     Inpatient consult to Registered Dietitian/Nutritionist  Once        Provider:  (Not yet assigned)    Completed RICK SHIN     Inpatient consult to Endocrinology  Once        Provider:  (Not yet assigned)    Completed LILI STEIN             Pending Diagnostic Studies:       Procedure Component Value Units Date/Time    Lipase [[id number]] Collected: 11/04/22 0418    Order Status: Sent Lab Status: In process Updated: 11/04/22 0418    Specimen: Blood           Significant Diagnostic Studies: Labs: CMP   Recent Labs   Lab 11/08/22 0549 11/09/22 0614    136   K 4.2 4.2    109   CO2 23 18*   GLU 90 82   BUN 29* 17   CREATININE 1.0 0.9   CALCIUM 9.0 9.4   ALBUMIN 3.5 3.5   ANIONGAP 7* 9   , CBC   Recent Labs   Lab 11/08/22 0549 11/09/22 0614   WBC 7.85 9.42   HGB 8.9* 9.3*   HCT 26.1* 28.2*    265   , INR   Lab Results   Component Value Date    INR 1.1 11/02/2022    INR 1.2 05/08/2022    INR 1.0 05/21/2021   , and All labs within the past 24 hours have been reviewed    Discharged Condition: good    Disposition: Home or Self Care    Follow Up: see hospital course    Patient Instructions:      Diet Adult Regular     Diet Adult Regular     No dressing needed     Notify your health care provider if you experience any of the following:  temperature >100.4     Notify your health care provider if you experience any of the following:  persistent nausea and vomiting or diarrhea     Notify your health care provider if you experience any of the following:  severe uncontrolled pain     Notify your health care provider if you experience any of the following:  redness, tenderness, or signs of infection (pain, swelling, redness, odor or green/yellow discharge around incision site)     Notify your health care provider if you experience any of the following:  difficulty breathing or increased cough     Notify your health care provider if you experience any of the following:  severe persistent headache     Notify your health care provider if you experience any of the following:  worsening rash     Notify your health care provider if you experience any of the following:  persistent dizziness, light-headedness, or visual disturbances     Notify your  health care provider if you experience any of the following:  increased confusion or weakness     Notify your health care provider if you experience any of the following:   Order Comments: Any other concerning signs and symptoms. If you have not received your scheduled follow up within 24 hours of your discharge or for any questions or concerns, please call [phone] for further assistance.     No dressing needed     Notify your health care provider if you experience any of the following:  temperature >100.4     Notify your health care provider if you experience any of the following:  persistent nausea and vomiting or diarrhea     Notify your health care provider if you experience any of the following:  severe uncontrolled pain     Notify your health care provider if you experience any of the following:  redness, tenderness, or signs of infection (pain, swelling, redness, odor or green/yellow discharge around incision site)     Notify your health care provider if you experience any of the following:  difficulty breathing or increased cough     Notify your health care provider if you experience any of the following:  severe persistent headache     Notify your health care provider if you experience any of the following:  worsening rash     Notify your health care provider if you experience any of the following:  persistent dizziness, light-headedness, or visual disturbances     Notify your health care provider if you experience any of the following:  increased confusion or weakness     Notify your health care provider if you experience any of the following:   Order Comments: Any other concerning signs and symptoms. If you have not received your scheduled follow up within 24 hours of your discharge or for any questions or concerns, please call [phone] for further assistance.     Activity as tolerated     Medications:  Reconciled Home Medications:      Medication List        START taking these medications       famotidine 20 MG tablet  Commonly known as: PEPCID  Take 1 tablet (20 mg total) by mouth once daily. Stop 11/27/22 for 19 days     multivitamin per tablet  Commonly known as: THERAGRAN  Take 1 tablet by mouth once daily.     mycophenolate 250 mg Cap  Commonly known as: CELLCEPT  Take 4 capsules (1,000 mg total) by mouth 2 (two) times daily.     nystatin 100,000 unit/mL suspension  Commonly known as: MYCOSTATIN  Take 5 mLs (500,000 Units total) by mouth 2 hours after meals and at bedtime. for 21 days     ondansetron 8 MG Tbdl  Commonly known as: ZOFRAN-ODT  DISSOLVE 1 tablet (8 mg total) by mouth every 8 (eight) hours as needed (nausea).     oxyCODONE 10 mg Tab immediate release tablet  Commonly known as: ROXICODONE  Take 0.5-1 tablets (5-10 mg total) by mouth every 4 (four) hours as needed for Pain.     PHOSPHA 250 NEUTRAL 250 mg Tab  Generic drug: k phos di & mono-sod phos mono  Take 2 tablets by mouth 2 (two) times a day.     predniSONE 5 MG tablet  Commonly known as: DELTASONE  From 11/6 to 11/12 take 20mg by mouth once daily;  From 11/13 to 11/19 take 15mg daily;  From 11/20 to 11/26 take 10mg daily;  From 11/27/22 start 5mg daily thereafter     sodium bicarbonate 650 MG tablet  Take 2 tablets (1,300 mg total) by mouth 2 (two) times daily.     sulfamethoxazole-trimethoprim 400-80mg 400-80 mg per tablet  Commonly known as: BACTRIM,SEPTRA  Take 1 tablet by mouth once daily. Stop 5/2/2023     tacrolimus 1 MG Cap  Commonly known as: PROGRAF  Take 6 capsules (6 mg total) by mouth every 12 (twelve) hours.     valGANciclovir 450 mg Tab  Commonly known as: VALCYTE  Take 2 tablets (900 mg total) by mouth once daily. Stop 2/2/2023     VITAMIN D2 50,000 unit Cap  Generic drug: ergocalciferol  Take 1 capsule (50,000 Units total) by mouth every 7 days.  Start taking on: November 11, 2022            CHANGE how you take these medications      NIFEdipine 60 MG (OSM) 24 hr tablet  Commonly known as: PROCARDIA-XL  Take 1  "tablet (60 mg total) by mouth 2 (two) times a day. HOLD FOR SBP <130  What changed: additional instructions            CONTINUE taking these medications      aspirin 81 MG EC tablet  Commonly known as: ECOTRIN  Take 81 mg by mouth once daily.     carvediloL 25 MG tablet  Commonly known as: COREG  Take 25 mg by mouth 2 (two) times daily with meals.     rosuvastatin 10 MG tablet  Commonly known as: CRESTOR  Take 1 tablet (10 mg total) by mouth every evening.     venlafaxine 37.5 MG 24 hr capsule  Commonly known as: EFFEXOR XR  Take 1 capsule (37.5 mg total) by mouth once daily.            STOP taking these medications      blood sugar diagnostic Strp     calcitRIOL 0.5 MCG Cap  Commonly known as: ROCALTROL     fluticasone propionate 50 mcg/actuation nasal spray  Commonly known as: FLONASE     FREESTYLE MARCY 14 DAY READER Misc  Generic drug: flash glucose scanning reader     FREESTYLE MARCY 14 DAY SENSOR Kit  Generic drug: flash glucose sensor     HAIR,SKIN AND NAILS(FA-BIOTIN) ORAL     hydrALAZINE 25 MG tablet  Commonly known as: APRESOLINE     insulin detemir U-100 100 unit/mL (3 mL) Inpn pen  Commonly known as: Levemir FLEXTOUCH     insulin lispro 100 unit/mL pen  Commonly known as: HumaLOG KwikPen Insulin     lancets Misc     medroxyPROGESTERone 150 mg/mL Syrg  Commonly known as: DEPO-PROVERA     pen needle, diabetic 32 gauge x 5/32" Ndle     prednisoLONE acetate 1 % Drps  Commonly known as: PRED FORTE     sevelamer carbonate 800 mg Tab  Commonly known as: RENVELA     traZODone 50 MG tablet  Commonly known as: DESYREL     TYLENOL EXTRA STRENGTH ORAL            Time spent caring for patient (Greater than 1/2 spent in direct face-to-face contact): > 30 minutes    Abby Mccord, KUMAR  Kidney Transplant  [facility] - Transplant Stepdown  "

## 2022-11-09 NOTE — ASSESSMENT & PLAN NOTE
"- S/p SPK 11/3/22. Surgery without complication  - Cr trending down, excellent uop. Moncada replaced x 2. See "urinary retention"  - POD#1 kidney/panc US 11/3 stable  - Panc enzymes trended down, slightly elevated 11/7   - Panc US 11/8 stable  - Drain with 400-700 ml/day. Sent for amylase, resulted 204. DC drain 11/8.  - Diet advanced to regular 11/5.   - Tolerating diet, no N/V, (+) BM. Pain well controlled.   "

## 2022-11-09 NOTE — PROGRESS NOTES
Transplant Coordinator  11/3-11/9/2022    Pt admitted for a combined Kidney/Pancreas transplant. ESRD 2/2 DMI. Thymo induction, CMV D-/R+, KDPI 1%.     Surgery without complication.   Cr trending down, 1.0 at time of d/c.Excellent UOP. Pancreas enzymes and BG also trended down.     Midline incision ALY with staples  Dc and SINAN x2 removed.   Of note-pt did have urinary retention post tx requiring dc replacement. Now resolved.    Labs 11/10 and RTC to meet with coordinator/pharmD

## 2022-11-09 NOTE — SUBJECTIVE & OBJECTIVE
"  Subjective:   History of Present Illness:  Ms. Read is a 27 y.o. year old  female with ESRD secondary to diabetic nephropathy and HTN.  She has been on the wait list for a kidney transplant at [facility] since 2021. Patient is currently on hemodialysis started on 21. Patient is dialyzing on TTS schedule, last HD 22. Patient reports that she is tolerating dialysis well. She has a LUE AV fistula. She is dialyzing 4 hours per session. Dry weight 61 kg. She reports urinating usually once per day ("normal" amount per urination)    She now presents as a direct admit as primary candidate for a  donor kidney/pancreas transplant. OR times have not been established at this time. Pt is currently not NPO. Dr. Rodriges will be the primary surgeon. Thymo induction. Pre-op labs and imaging have been ordered and will be reviewed prior to transplant. Endocrine consulted to assist with blood sugar management.    Hospital Course:  Underwent combined kidney and pancreas transplant 2022 which was uneventful. Sent to ICU for recovery, extubated.  Immediately making urine. Insulin discontinued within several hours of arrival to ICU. Stepped down to TSU POD#1. Diet advanced .     Interval History: no acute events overnight. Progressing well. Reports little sleep due to frequent trips to the bathroom to urinate and have BM. Cr 1.0, excellent uop. Able to void able dc removed yesterday. Panc enzymes remain slightly elevated. Panc US  stable. Amylase from drain sent yesterday due to high drain output, resulted 204. DC drain today. Tolerating diet, no N/V, (+) BM, change bowel regimen to PRN. BP elevated, adjusted anti-hypertensives. Pain well controlled. Encourage ambulation and OOBTC during the day.     SINAN drain removal: Site cleaned with alcohol, 1% lidocaine used for local anesthestic.  3-0 prolene used for suture.  Drain tip intact.  Patient tolerated procedure well.  Will continue to " monitor for any complications.      Past Medical, Surgical, Family, and Social History:   Unchanged from H&P.    Scheduled Meds:   acetaminophen  650 mg Oral Q6H    aspirin  81 mg Oral Daily    carvediloL  25 mg Oral BID WM    ergocalciferol  50,000 Units Oral Q7 Days    famotidine  20 mg Oral Daily    fluconazole  200 mg Oral Daily    heparin (porcine)  5,000 Units Subcutaneous TID    k phos di & mono-sod phos mono  500 mg Oral BID    mycophenolate  1,000 mg Oral BID    NIFEdipine  30 mg Oral BID    [START ON 11/10/2022] nystatin  500,000 Units Mouth/Throat QID    predniSONE  20 mg Oral Daily    sodium bicarbonate  650 mg Oral BID    [START ON 11/13/2022] sulfamethoxazole-trimethoprim 400-80mg  1 tablet Oral Daily AM    tacrolimus  6 mg Oral BID    [START ON 11/13/2022] valGANciclovir  450 mg Oral Daily    venlafaxine  37.5 mg Oral Daily     Continuous Infusions:  PRN Meds:sodium chloride 0.9%, bisacodyL, dextrose 10%, dextrose 10%, docusate sodium, hydrALAZINE, labetalol, melatonin, naloxone, oxyCODONE, oxyCODONE, polyethylene glycol    Intake/Output - Last 3 Shifts         11/07 0700  11/08 0659 11/08 0700 11/09 0659 11/09 0700  11/10 0659    P.O. 1300 2230     I.V. (mL/kg)  0 (0)     Other  0     IV Piggyback  250     Total Intake(mL/kg) 1300 (17.9) 2480 (37)     Urine (mL/kg/hr) 2550 (1.5) 3100 (1.9)     Emesis/NG output  0     Drains 520      Other  0     Stool 0 0     Blood  0     Total Output 3070 3100     Net -1770 -620            Urine Occurrence  0 x     Stool Occurrence 2 x 4 x     Emesis Occurrence  0 x              Review of Systems   Constitutional: Negative.  Negative for appetite change, chills, fatigue and fever.   HENT: Negative.     Respiratory:  Negative for cough, chest tightness and shortness of breath.    Cardiovascular:  Negative for chest pain, palpitations and leg swelling.   Gastrointestinal:  Positive for abdominal pain. Negative for abdominal distention, constipation, diarrhea, nausea  "and vomiting.   Genitourinary:  Negative for difficulty urinating, dysuria, frequency and urgency.   Musculoskeletal:  Negative for back pain and neck pain.   Skin:  Negative for color change, pallor, rash and wound.   Allergic/Immunologic: Positive for immunocompromised state.   Neurological: Negative.  Negative for dizziness, weakness and headaches.   Psychiatric/Behavioral:  Negative for confusion and sleep disturbance.     Objective:     Vital Signs (Most Recent):  Temp: 98.3 °F (36.8 °C) (11/09/22 0400)  Pulse: 100 (11/09/22 0406)  Resp: 18 (11/09/22 0422)  BP: (!) 141/81 (11/09/22 0406)  SpO2: 98 % (11/09/22 0400)   Vital Signs (24h Range):  Temp:  [98.2 °F (36.8 °C)-98.4 °F (36.9 °C)] 98.3 °F (36.8 °C)  Pulse:  [] 100  Resp:  [16-18] 18  SpO2:  [96 %-100 %] 98 %  BP: (141-190)/(70-96) 141/81     Weight: 67.1 kg (148 lb 0.6 oz)  Height: 5' 4" (162.6 cm)  Body mass index is 25.41 kg/m².    Physical Exam  Constitutional:       General: She is not in acute distress.  Cardiovascular:      Rate and Rhythm: Normal rate and regular rhythm.   Pulmonary:      Effort: Pulmonary effort is normal. No respiratory distress.      Breath sounds: Normal breath sounds.   Abdominal:      General: Bowel sounds are normal.      Palpations: Abdomen is soft. There is no mass.      Tenderness: There is abdominal tenderness.          Comments: Midline skin incision with intact skin staples  SINAN drain x1 in place with thin serosanguinous output   Musculoskeletal:         General: Normal range of motion.   Skin:     General: Skin is warm and dry.      Capillary Refill: Capillary refill takes less than 2 seconds.   Neurological:      General: No focal deficit present.      Mental Status: She is oriented to person, place, and time. Mental status is at baseline.   Psychiatric:         Mood and Affect: Mood normal.         Behavior: Behavior normal.         Thought Content: Thought content normal.       Laboratory:  CBC:   Recent Labs "   Lab 11/07/22  0556 11/08/22  0549 11/09/22  0614   WBC 7.79 7.85 9.42   RBC 2.59* 2.72* 2.92*   HGB 8.2* 8.9* 9.3*   HCT 24.7* 26.1* 28.2*    204 265   MCV 95 96 97   MCH 31.7* 32.7* 31.8*   MCHC 33.2 34.1 33.0     CMP:   Recent Labs   Lab 11/07/22  0556 11/08/22  0549 11/09/22  0614    90 82   CALCIUM 9.0 9.0 9.4   ALBUMIN 3.9 3.5 3.5    138 136   K 3.5 4.2 4.2   CO2 23 23 18*    108 109   BUN 48* 29* 17   CREATININE 1.7* 1.0 0.9     Coagulation: No results for input(s): PT, APTT in the last 168 hours.  Labs within the past 24 hours have been reviewed.    Diagnostic Results:  US - Kidney: Results for orders placed during the hospital encounter of 11/02/22    US Transplant Kidney With Doppler    Narrative  EXAMINATION:  US TRANSPLANT KIDNEY WITH DOPPLER    CLINICAL HISTORY:  cessation of urine post kidney/pancreas transplant;    TECHNIQUE:  Real time gray scale and doppler ultrasound was performed over the patient's renal allograft.    COMPARISON:  Kidney transplant Doppler ultrasound 11/04/2022.    FINDINGS:  Renal allograft in the right lower quadrant.  The allograft measures 10.8 cm, previously 10.6 cm.  Normal perfusion. No hydronephrosis.  There is a ureteral stent.    Similar size of peritransplant fluid collection measuring 1.4 x 4.1 x 2.5 cm (previously 1.8 x 3.0 x 3.2 cm).    Vasculature:    Resistive indices:    Intralobar: 0.69, previously 0.76.    Upper segmental: 0.74, previously 0.74.    Mid segmental: 0.75, previously 0.74.    Lower segmental: 0.70, previously 0.69.    Main renal artery peak systolic velocity: 287cm/sec with normal waveform, previously 384 centimeter/second..    Renal artery/iliac ratio: 2.1.    The main renal vein is patent.    Bladder is decompressed around a Moncada catheter and poorly visualized.    Impression  1. Interval improvement of elevated peak systolic velocity of the main renal artery.  2. Mild decrease in the resistive index in the intralobar  renal artery.  Remainder of resistive indices are similar to ultrasound from 11/04/2022.  3. Similar size of peritransplant fluid collection.    Electronically signed by resident: Palmira Lewis  Date:    11/05/2022  Time:    18:11    Electronically signed by: Adrian Knox MD  Date:    11/05/2022  Time:    18:46    US - Pancreas: Results for orders placed during the hospital encounter of 11/02/22    US Pancreas Transplant Post    Narrative  EXAMINATION:  US DOPPLER PANCREAS TRANSPLANT POST (XPD)    CLINICAL HISTORY:  s/p kidney/panc txp. panc enzymes mildly elevated;    TECHNIQUE:  Grayscale, color flow, and spectral analysis was used to evaluate the pancreas allograft using transabdominal ultrasound technique.    COMPARISON:  Pancreas transplant ultrasound: 11/03/2022.    FINDINGS:  Pancreatic transplant on 11/03/2022.    Pancreatic allograft is homogeneous in echogenicity.  There are no peripancreatic fluid collections.    VELOCITIES:    Conduit:245 (previously 310) cm/s    Splenic artery:36 (previously 42) cm/s    SMA of Y graft: 32 (previously 36) cm/s.    Splenic vein:25 (previously 31) cm/s    Portal vein:22 (previously 48) cm/s    Superior mesenteric vein:32 (previously 79) cm/s    Iliac artery:252 (previously 253) cm/s    RESISTIVE INDICES:    Head: 0.67 (previously 0.50).    Body: 0.56 (previously 0.59).    Tail: 0.56 (previously 0.54).    Impression  Satisfactory Doppler evaluation the pancreatic allograft, as above.    Electronically signed by resident: Jimmie Fuentes  Date:    11/07/2022  Time:    13:29    Electronically signed by: Jim Cedeño MD  Date:    11/07/2022  Time:    14:29

## 2022-11-09 NOTE — PLAN OF CARE
27 year-old female admitted 11/2/22 for kidney/pancreas txp 11/3/22. Pt has hx of ESRD, Diabetic nephropathy, HTN, HLD, anemia, Anxiety, glaucoma  -AAOx4, ambulates independently  -Rt upper arm midline   -Lt upper arm fistula +/+  -midline incision with staples  -RLQ SINAN drain removed 11/8, dressing in place CDI  -Cr 0.9  -Phos 1.7  -Amylase 93  -Pancreas US cancelled  -standing BP  -Nifedipine increased to 60 mg  -PRN Oxy 5/10 mg   -self meds 100%  -mother at bedside, pt sitting on side of bed, bed in lowest position, wheels locked, non-skid socks in place, call light within reach  -pending discharge

## 2022-11-09 NOTE — PLAN OF CARE
AAOx3, afebrile, c/o pain. PRN oxy, scheduled tylenol and heat packs given. Cr 1.0. Amylase and Lipase trending down. Plan for pancreas US in am. Standing bp done. BP is lower standing. Left arm fistula +/+. Self meds 100%. Midline incision with staples- painted with betadine. Pt urinating-urine is pink/ bloody due to pt being on her period. +bm. Mother at the bedside. Pt able to position self independently.  Pt in lowest position, side rails up x2, non-skid foot wear in place, call light within reach, pt verbalized understanding to call RN when needed.  Hand hygiene practiced per protocol.  Will continue to monitor.

## 2022-11-09 NOTE — PROGRESS NOTES
DISCHARGE NOTE:    Isabela Read is a 27 y.o. 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      Past Medical History:   Diagnosis Date    Acute kidney injury superimposed on chronic kidney disease 4/7/2021    Anemia     Chronic hypertension with exacerbation during pregnancy in second trimester 11/6/2020    Current regimen (11/6/20):  - Carvedilol 12.5 mg BID - Nifedipine 30 mg daily Baseline CKD + proteinuria    Chronic kidney disease     Depression     Diabetes mellitus     Diabetic retinopathy     Diarrhea     Encounter for blood transfusion     End stage renal failure on dialysis     Gastroparesis     Glaucoma     Hepatomegaly 4/29/2021    Hx of psychiatric care     Hyperlipidemia     Hypertension     Nephrotic syndrome     Nephrotic syndrome 3/4/2021    Palpitations     Poor fetal growth affecting management of mother in second trimester 10/15/2020    Restrictive lung disease     Retinal detachment     Severe pre-eclampsia in second trimester 11/6/2020    Type 1 diabetes mellitus with end-stage renal disease (ESRD) 2/24/2015    Followed by Dr. Mayo.  Last A1C was 13  Currently on lantus 20 units qAM and humolog 10 units with meals  - a fetal echocardiogram around 22-24 weeks - needs eye exam, podiatry exam  - needs EKG, maternal echo and fu with cardiology  - ASA 81mg, folic acid 4mg PO daily - hemoglobin A1c every 4-6 weeks -24 hour urine for protein and creatinine clearance should be performed. Patient will also need a       Hospital Course: Patient does not have complications post-transplant. Patient stayed another day due to elevated BP. Increased nifedipine 30 mg to 60 mg BID and started sodium bicarbonate 1300 mg BID.      Allergies: Review of patient's allergies indicates:  No Known Allergies    Patient Pharmacy: [facility]    Discharge Medications:     Medication List        START taking these  medications      famotidine 20 MG tablet  Commonly known as: PEPCID  Take 1 tablet (20 mg total) by mouth once daily. Stop 11/27/22 for 19 days     multivitamin per tablet  Commonly known as: THERAGRAN  Take 1 tablet by mouth once daily.     mycophenolate 250 mg Cap  Commonly known as: CELLCEPT  Take 4 capsules (1,000 mg total) by mouth 2 (two) times daily.     nystatin 100,000 unit/mL suspension  Commonly known as: MYCOSTATIN  Take 5 mLs (500,000 Units total) by mouth 2 hours after meals and at bedtime. for 21 days     ondansetron 8 MG Tbdl  Commonly known as: ZOFRAN-ODT  DISSOLVE 1 tablet (8 mg total) by mouth every 8 (eight) hours as needed (nausea).     oxyCODONE 10 mg Tab immediate release tablet  Commonly known as: ROXICODONE  Take 0.5-1 tablets (5-10 mg total) by mouth every 4 (four) hours as needed for Pain.     PHOSPHA 250 NEUTRAL 250 mg Tab  Generic drug: k phos di & mono-sod phos mono  Take 2 tablets by mouth 2 (two) times a day.     predniSONE 5 MG tablet  Commonly known as: DELTASONE  From 11/6 to 11/12 take 20mg by mouth once daily;  From 11/13 to 11/19 take 15mg daily;  From 11/20 to 11/26 take 10mg daily;  From 11/27/22 start 5mg daily thereafter     sodium bicarbonate 650 MG tablet  Take 2 tablets (1,300 mg total) by mouth 2 (two) times daily.     sulfamethoxazole-trimethoprim 400-80mg 400-80 mg per tablet  Commonly known as: BACTRIM,SEPTRA  Take 1 tablet by mouth once daily. Stop 5/2/2023     tacrolimus 1 MG Cap  Commonly known as: PROGRAF  Take 6 capsules (6 mg total) by mouth every 12 (twelve) hours.     valGANciclovir 450 mg Tab  Commonly known as: VALCYTE  Take 2 tablets (900 mg total) by mouth once daily. Stop 2/2/2023     VITAMIN D2 50,000 unit Cap  Generic drug: ergocalciferol  Take 1 capsule (50,000 Units total) by mouth every 7 days.  Start taking on: November 11, 2022            CHANGE how you take these medications      NIFEdipine 60 MG (OSM) 24 hr tablet  Commonly known as:  "PROCARDIA-XL  Take 1 tablet (60 mg total) by mouth 2 (two) times a day. HOLD FOR SBP <130  What changed: additional instructions            CONTINUE taking these medications      aspirin 81 MG EC tablet  Commonly known as: ECOTRIN     carvediloL 25 MG tablet  Commonly known as: COREG     rosuvastatin 10 MG tablet  Commonly known as: CRESTOR  Take 1 tablet (10 mg total) by mouth every evening.     venlafaxine 37.5 MG 24 hr capsule  Commonly known as: EFFEXOR XR  Take 1 capsule (37.5 mg total) by mouth once daily.            STOP taking these medications      blood sugar diagnostic Strp     calcitRIOL 0.5 MCG Cap  Commonly known as: ROCALTROL     fluticasone propionate 50 mcg/actuation nasal spray  Commonly known as: FLONASE     FREESTYLE MARCY 14 DAY READER Misc  Generic drug: flash glucose scanning reader     FREESTYLE MARCY 14 DAY SENSOR Kit  Generic drug: flash glucose sensor     HAIR,SKIN AND NAILS(FA-BIOTIN) ORAL     hydrALAZINE 25 MG tablet  Commonly known as: APRESOLINE     insulin detemir U-100 100 unit/mL (3 mL) Inpn pen  Commonly known as: Levemir FLEXTOUCH     insulin lispro 100 unit/mL pen  Commonly known as: HumaLOG KwikPen Insulin     lancets Misc     medroxyPROGESTERone 150 mg/mL Syrg  Commonly known as: DEPO-PROVERA     pen needle, diabetic 32 gauge x 5/32" Ndle     prednisoLONE acetate 1 % Drps  Commonly known as: PRED FORTE     sevelamer carbonate 800 mg Tab  Commonly known as: RENVELA     traZODone 50 MG tablet  Commonly known as: DESYREL     TYLENOL EXTRA STRENGTH ORAL               Where to Get Your Medications        These medications were sent to [facility] [address] [address]      Hours: Mon-Fri 7a-7p, Sat-Sun 10a-4p Phone: [phone]   famotidine 20 MG tablet  multivitamin per tablet  mycophenolate 250 mg Cap  NIFEdipine 60 MG (OSM) 24 hr tablet  nystatin 100,000 unit/mL suspension  ondansetron 8 MG Tbdl  oxyCODONE 10 mg Tab immediate release " tablet  PHOSPHA 250 NEUTRAL 250 mg Tab  predniSONE 5 MG tablet  rosuvastatin 10 MG tablet  sodium bicarbonate 650 MG tablet  sulfamethoxazole-trimethoprim 400-80mg 400-80 mg per tablet  tacrolimus 1 MG Cap  valGANciclovir 450 mg Tab  venlafaxine 37.5 MG 24 hr capsule  VITAMIN D2 50,000 unit Cap          Pharmacy Interventions/Recommendations:  1) Transplant Immunosuppression: Induction Thymo, and maintenance Prograf 6 mg BID, MMF 1000 mg BID, and prednisone taper    2) Opportunistic Infection prophylaxis: Bactrim until 5/2/23, Valcyte until 2/1/23, and nystatin until 12/2/22    3) Osteoporosis Prevention measures (liver txp): N/A    4) Patient Counseling/Education: Demonstrated the use of the BP cuff, thermometer.    5) Follow-Up/Discharge Needs: Patient will need to get pill cutter and thermometer in clinic tomorrow.    6) Patient Assistance Information: Patient's LAMED termed, patient to work on over the next month. Patient paid $124 on her CC.    7) The following medications have been placed on HOLD and should be restarted in the outpatient setting (when appropriate): N/A    Crystal and her caregiver verbalized their understanding and had the opportunity to ask questions.

## 2022-11-09 NOTE — PROGRESS NOTES
Transplant Social Work Discharge Note:    Pt will discharge to patient's home under the care of Charo Ricketts, patient's mother, phone number [phone] .      met with patient and mother in patient's room. Patient reported feeling ready to discharge, and denied any mental health concerns at this time.     Patient voiced understanding that she will need to contact medicaid to have insurance reinstated. Patient provided with medicare open enrollment information.     Patient agreed to contact transplant team with needs, concerns, or questions as needed.     Pt aware of, involved in, and coping well with this discharge plan. Pt did not have any concerns with the discharge plan at this time. SW remains available at [phone].    Makayla Bess LMSW

## 2022-11-10 ENCOUNTER — LAB VISIT (OUTPATIENT)
Dept: LAB | Facility: HOSPITAL | Age: 27
End: 2022-11-10
Attending: INTERNAL MEDICINE
Payer: MEDICARE

## 2022-11-10 ENCOUNTER — CLINICAL SUPPORT (OUTPATIENT)
Dept: TRANSPLANT | Facility: CLINIC | Age: 27
End: 2022-11-10
Payer: MEDICARE

## 2022-11-10 ENCOUNTER — PATIENT MESSAGE (OUTPATIENT)
Dept: ADMINISTRATIVE | Facility: HOSPITAL | Age: 27
End: 2022-11-10
Payer: MEDICARE

## 2022-11-10 ENCOUNTER — NURSE TRIAGE (OUTPATIENT)
Dept: ADMINISTRATIVE | Facility: CLINIC | Age: 27
End: 2022-11-10
Payer: MEDICARE

## 2022-11-10 VITALS
DIASTOLIC BLOOD PRESSURE: 86 MMHG | HEIGHT: 64 IN | SYSTOLIC BLOOD PRESSURE: 137 MMHG | HEART RATE: 102 BPM | RESPIRATION RATE: 18 BRPM | HEIGHT: 64 IN | WEIGHT: 146.19 LBS | OXYGEN SATURATION: 100 % | SYSTOLIC BLOOD PRESSURE: 137 MMHG | HEART RATE: 102 BPM | RESPIRATION RATE: 18 BRPM | OXYGEN SATURATION: 100 % | BODY MASS INDEX: 24.96 KG/M2 | WEIGHT: 146.19 LBS | TEMPERATURE: 97 F | BODY MASS INDEX: 24.96 KG/M2 | DIASTOLIC BLOOD PRESSURE: 86 MMHG | TEMPERATURE: 97 F

## 2022-11-10 DIAGNOSIS — E83.42 HYPOMAGNESEMIA: Primary | ICD-10-CM

## 2022-11-10 DIAGNOSIS — Z94.0 KIDNEY REPLACED BY TRANSPLANT: ICD-10-CM

## 2022-11-10 DIAGNOSIS — Z94.0 KIDNEY REPLACED BY TRANSPLANT: Primary | ICD-10-CM

## 2022-11-10 DIAGNOSIS — Z94.83 PANCREAS REPLACED BY TRANSPLANT: ICD-10-CM

## 2022-11-10 LAB
ALBUMIN SERPL BCP-MCNC: 4 G/DL (ref 3.5–5.2)
AMYLASE SERPL-CCNC: 88 U/L (ref 20–110)
ANION GAP SERPL CALC-SCNC: 11 MMOL/L (ref 8–16)
BUN SERPL-MCNC: 17 MG/DL (ref 6–20)
CALCIUM SERPL-MCNC: 10 MG/DL (ref 8.7–10.5)
CHLORIDE SERPL-SCNC: 110 MMOL/L (ref 95–110)
CO2 SERPL-SCNC: 18 MMOL/L (ref 23–29)
CREAT SERPL-MCNC: 0.9 MG/DL (ref 0.5–1.4)
EST. GFR  (NO RACE VARIABLE): >60 ML/MIN/1.73 M^2
GLUCOSE SERPL-MCNC: 81 MG/DL (ref 70–110)
LIPASE SERPL-CCNC: 29 U/L (ref 4–60)
MAGNESIUM SERPL-MCNC: 1.4 MG/DL (ref 1.6–2.6)
PHOSPHATE SERPL-MCNC: 1.6 MG/DL (ref 2.7–4.5)
POTASSIUM SERPL-SCNC: 4.3 MMOL/L (ref 3.5–5.1)
SODIUM SERPL-SCNC: 139 MMOL/L (ref 136–145)
TACROLIMUS BLD-MCNC: 12.5 NG/ML (ref 5–15)

## 2022-11-10 PROCEDURE — 99212 OFFICE O/P EST SF 10 MIN: CPT | Mod: PBBFAC,27

## 2022-11-10 PROCEDURE — 99214 OFFICE O/P EST MOD 30 MIN: CPT | Mod: PBBFAC

## 2022-11-10 PROCEDURE — 80069 RENAL FUNCTION PANEL: CPT | Performed by: INTERNAL MEDICINE

## 2022-11-10 PROCEDURE — 99999 PR PBB SHADOW E&M-EST. PATIENT-LVL II: ICD-10-PCS | Mod: PBBFAC,,,

## 2022-11-10 PROCEDURE — 99999 PR PBB SHADOW E&M-EST. PATIENT-LVL IV: CPT | Mod: PBBFAC,,,

## 2022-11-10 PROCEDURE — 80197 ASSAY OF TACROLIMUS: CPT | Performed by: INTERNAL MEDICINE

## 2022-11-10 PROCEDURE — 36415 COLL VENOUS BLD VENIPUNCTURE: CPT | Performed by: INTERNAL MEDICINE

## 2022-11-10 PROCEDURE — 83735 ASSAY OF MAGNESIUM: CPT | Performed by: INTERNAL MEDICINE

## 2022-11-10 PROCEDURE — 82150 ASSAY OF AMYLASE: CPT | Performed by: INTERNAL MEDICINE

## 2022-11-10 PROCEDURE — 83690 ASSAY OF LIPASE: CPT | Performed by: INTERNAL MEDICINE

## 2022-11-10 PROCEDURE — 99999 PR PBB SHADOW E&M-EST. PATIENT-LVL II: CPT | Mod: PBBFAC,,,

## 2022-11-10 PROCEDURE — 99999 PR PBB SHADOW E&M-EST. PATIENT-LVL IV: ICD-10-PCS | Mod: PBBFAC,,,

## 2022-11-10 RX ORDER — LANOLIN ALCOHOL/MO/W.PET/CERES
400 CREAM (GRAM) TOPICAL 2 TIMES DAILY
Qty: 60 TABLET | Refills: 11 | Status: ON HOLD | OUTPATIENT
Start: 2022-11-10 | End: 2023-01-25 | Stop reason: HOSPADM

## 2022-11-10 NOTE — TELEPHONE ENCOUNTER
Patient verbalizes understanding of medication change. Advised patient to incorporate high magnesium and high phosphorous foods into diet. Patient encouraged to refer to page 100 in transplant book for list of foods high in magnesium and phosphorous.     ----- Message from Tammie Broussard DO sent at 11/10/2022  4:28 PM CST -----  Acceptable FK  Baseline kidney and pancreas grafts  Start on MgOx 400 BID  Cont on kphos supplements

## 2022-11-10 NOTE — PROGRESS NOTES
Clinic Note: First Return to Clinic Post-  Kidney Pancreas Transplant    Isabela Read  is a 27 y.o. female  S/p  RIGHT KIDNEY  transplant on 11/3/2022 (Kidney / Pancreas) for Diabetes Mellitus - Type I.      Discharge Course (Issues/Concerns): Uneventful post op course Patient stayed another day due to elevated BP. Increased nifedipine 30 mg to 60 mg BID and started sodium bicarbonate 1300 mg BID.     Objective:   Vitals:    11/10/22 1023   BP: 137/86   Pulse: 102   Resp: 18   Temp: 97.2 °F (36.2 °C)       Met with patient and her caregiver in the clinic to review current medication list.     Current Outpatient Medications   Medication Sig Dispense Refill    aspirin (ECOTRIN) 81 MG EC tablet Take 81 mg by mouth once daily.      carvediloL (COREG) 25 MG tablet Take 25 mg by mouth 2 (two) times daily with meals.      [START ON 11/11/2022] ergocalciferol (ERGOCALCIFEROL) 50,000 unit Cap Take 1 capsule (50,000 Units total) by mouth every 7 days. 4 capsule 5    famotidine (PEPCID) 20 MG tablet Take 1 tablet (20 mg total) by mouth once daily. Stop 11/27/22 for 19 days 19 tablet 0    k phos di & mono-sod phos mono (K-PHOS-NEUTRAL) 250 mg Tab Take 2 tablets by mouth 2 (two) times a day. 120 tablet 5    multivitamin (THERAGRAN) per tablet Take 1 tablet by mouth once daily. 30 tablet 5    mycophenolate (CELLCEPT) 250 mg Cap Take 4 capsules (1,000 mg total) by mouth 2 (two) times daily. 240 capsule 11    NIFEdipine (PROCARDIA-XL) 60 MG (OSM) 24 hr tablet Take 1 tablet (60 mg total) by mouth 2 (two) times a day. HOLD FOR SBP <130 60 tablet 11    nystatin (MYCOSTATIN) 100,000 unit/mL suspension Take 5 mLs (500,000 Units total) by mouth 2 hours after meals and at bedtime. for 21 days 473 mL 0    ondansetron (ZOFRAN-ODT) 8 MG TbDL DISSOLVE 1 tablet (8 mg total) by mouth every 8 (eight) hours as needed (nausea). 30 tablet 1    oxyCODONE (ROXICODONE) 10 mg Tab immediate release tablet Take 0.5-1 tablets (5-10 mg total) by mouth  every 4 (four) hours as needed for Pain. 30 tablet 0    predniSONE (DELTASONE) 5 MG tablet From 11/6 to 11/12 take 20mg by mouth once daily;  From 11/13 to 11/19 take 15mg daily;  From 11/20 to 11/26 take 10mg daily;  From 11/27/22 start 5mg daily thereafter 70 tablet 11    rosuvastatin (CRESTOR) 10 MG tablet Take 1 tablet (10 mg total) by mouth every evening. 90 tablet 0    sodium bicarbonate 650 MG tablet Take 2 tablets (1,300 mg total) by mouth 2 (two) times daily. 120 tablet 11    sulfamethoxazole-trimethoprim 400-80mg (BACTRIM,SEPTRA) 400-80 mg per tablet Take 1 tablet by mouth once daily. Stop 5/2/2023 30 tablet 5    tacrolimus (PROGRAF) 1 MG Cap Take 6 capsules (6 mg total) by mouth every 12 (twelve) hours. 360 capsule 11    valGANciclovir (VALCYTE) 450 mg Tab Take 2 tablets (900 mg total) by mouth once daily. Stop 2/2/2023 60 tablet 2    venlafaxine (EFFEXOR XR) 37.5 MG 24 hr capsule Take 1 capsule (37.5 mg total) by mouth once daily. 30 capsule 11     No current facility-administered medications for this visit.       Pharmacy Interventions/Recommendations:     1) Graft Function & Immunosuppression Issues:     2) Opportunistic Infection prophylaxis:   PCP ppx: Sulfamethoxazole/Trimethoprim until 5/2/23  CMV ppx: Valganciclovir  until 2/1/23   Fungal ppx: nystatin until 11/29/22    3) Donor Serologies & Monitoring:     Donor CMV Status: Negative   Donor HCV Status: Negative   Donor HBcAb: Negative   Donor HBV KEE: Negative   Donor HCV KEE: Negative       4) Pain Management & Bowel Regimen: well controlled    5) Blood Pressure Management: Well controlled. AM /86    6) Blood Sugar Management & Follow-up: well controlled     7) Electrolyte Management: phos and mag low, encouraged dietary increases and continue phos supplement     8) Osteoporosis Prevention Strategy (Liver Transplant): NA     8) OTHER medication follow-up (patient assistance, held medications, etc): NA    9) EUA referral for Sailaja was  sent for this patient.  [facility] [employer] [facility] FAQ provided to patient and answered their questions about the monoclonal antibody.      10) Reinforced medication education conducted in the hospital, including medication indications, dosing, administration, side effects, monitoring-- including timing of immunosuppressant levels.     Patient received their FIRST fill of medications from [facility].  Discussed the process for obtaining refills of medications, including verifying the dose and mailing address to have medications delivered.     Isabela and her caregivers verbalized understanding and had the opportunity to ask questions.             Minoxidil Counseling: Minoxidil is a topical medication which can increase blood flow where it is applied. It is uncertain how this medication increases hair growth. Side effects are uncommon and include stinging and allergic reactions.

## 2022-11-10 NOTE — PROGRESS NOTES
"1ST NURSING VISIT POST DISCHARGE NOTE    1st RN appointment with Isabela Read post discharge 11/9/22 s/p kidney/pancreas transplant 11/3/22.  Patient's mother accompanied her.  Patient reports none.  Patient says that she that she is sleeping well.  Incision intact with staples.  Patient has  no drains .  Patient that she is able to explain daily incision care and showering instructions.  Reviewed I&O monitoring, measuring, and recording, and the need for hydration (i.e., at least 2 liters of water daily with minimal caffeine and no grapefruit products).  Medication list and rationale were reviewed.  Patient did bring blue medication card and medication bottles for review.  Patient reports that she has stopped Dulcolax and has not continued Colace.  Patient has had a bowel movement.  Patient expressed understanding of daily care including BID VS, medications, and I&O documentation.  Patient made aware of today's creatinine level: 0.9.  Patient aware that coordinator will review today's labs with a transplant physician and call the patient with any dose changes indicated.  Next lab appointment scheduled for 11/14/22.  First post-operative transplant team appointment with labs scheduled for 11/16/22.    Using the Kidney Transplant Patient Reference Manual, the patient submitted her open book "Self-assessment of Kidney Transplant Patient Knowledge" test, which was completed in the transplant clinic this morning before 1st nursing visit.  This test includes questions regarding critical dose medications commonly used after kidney/pancreas transplant, medication dosing and side effects, importance of timed lab draws, important signs and symptoms to report 24/7 immediately post-transplant as well as how to contact the transplant team 24/7.    Test Score: 25/25    After completing the test, the patient was given a copy of the Self-assessment Answer Key to reinforce accurate learning of test content.  Patient expressed her " understanding of the value of the information included in the self-assessment test.    Educated patient on collecting midstream urine sample. Verbalized understanding.

## 2022-11-11 NOTE — TELEPHONE ENCOUNTER
Isabela calling and has a question pertaining to her BP meds. Reports her BP sitting is   135/92 and standing is 109/73. Call placed to Transplant surgeon on-call. Spoke with Dr Rashid pt status reported. Instructed to have pt hold BP med if Systolic < 135 standing. Information given to Isabela.Protocol reviewed and care advice given. Pt agrees with advice and verbalizes understanding. Instructed to call for questions, concerns or changes.   Reason for Disposition   [1] Caller has URGENT medicine question about med that PCP or specialist prescribed AND [2] triager unable to answer question    Protocols used: Medication Question Call-A-

## 2022-11-14 ENCOUNTER — LAB VISIT (OUTPATIENT)
Dept: LAB | Facility: HOSPITAL | Age: 27
End: 2022-11-14
Attending: INTERNAL MEDICINE
Payer: MEDICARE

## 2022-11-14 ENCOUNTER — PATIENT MESSAGE (OUTPATIENT)
Dept: TRANSPLANT | Facility: CLINIC | Age: 27
End: 2022-11-14
Payer: MEDICARE

## 2022-11-14 DIAGNOSIS — Z94.0 KIDNEY REPLACED BY TRANSPLANT: ICD-10-CM

## 2022-11-14 DIAGNOSIS — Z94.83 STATUS POST PANCREAS TRANSPLANTATION: ICD-10-CM

## 2022-11-14 DIAGNOSIS — Z94.0 STATUS POST SIMULTANEOUS KIDNEY AND PANCREAS TRANSPLANT: ICD-10-CM

## 2022-11-14 DIAGNOSIS — Z94.83 STATUS POST SIMULTANEOUS KIDNEY AND PANCREAS TRANSPLANT: ICD-10-CM

## 2022-11-14 DIAGNOSIS — E10.10 TYPE 1 DIABETES MELLITUS WITH KETOACIDOSIS WITHOUT COMA: ICD-10-CM

## 2022-11-14 LAB
ALBUMIN SERPL BCP-MCNC: 4.1 G/DL (ref 3.5–5.2)
AMYLASE SERPL-CCNC: 116 U/L (ref 20–110)
ANION GAP SERPL CALC-SCNC: 8 MMOL/L (ref 8–16)
BASOPHILS # BLD AUTO: 0.03 K/UL (ref 0–0.2)
BASOPHILS NFR BLD: 0.2 % (ref 0–1.9)
BUN SERPL-MCNC: 26 MG/DL (ref 6–20)
CALCIUM SERPL-MCNC: 10.2 MG/DL (ref 8.7–10.5)
CHLORIDE SERPL-SCNC: 112 MMOL/L (ref 95–110)
CO2 SERPL-SCNC: 18 MMOL/L (ref 23–29)
CREAT SERPL-MCNC: 1.1 MG/DL (ref 0.5–1.4)
DIFFERENTIAL METHOD: ABNORMAL
EOSINOPHIL # BLD AUTO: 0.2 K/UL (ref 0–0.5)
EOSINOPHIL NFR BLD: 1.6 % (ref 0–8)
ERYTHROCYTE [DISTWIDTH] IN BLOOD BY AUTOMATED COUNT: 14.8 % (ref 11.5–14.5)
EST. GFR  (NO RACE VARIABLE): >60 ML/MIN/1.73 M^2
GLUCOSE SERPL-MCNC: 80 MG/DL (ref 70–110)
HCT VFR BLD AUTO: 33.4 % (ref 37–48.5)
HGB BLD-MCNC: 11.1 G/DL (ref 12–16)
IMM GRANULOCYTES # BLD AUTO: 0.15 K/UL (ref 0–0.04)
IMM GRANULOCYTES NFR BLD AUTO: 1.1 % (ref 0–0.5)
LIPASE SERPL-CCNC: 29 U/L (ref 4–60)
LYMPHOCYTES # BLD AUTO: 1.8 K/UL (ref 1–4.8)
LYMPHOCYTES NFR BLD: 13 % (ref 18–48)
MAGNESIUM SERPL-MCNC: 1.4 MG/DL (ref 1.6–2.6)
MCH RBC QN AUTO: 32.4 PG (ref 27–31)
MCHC RBC AUTO-ENTMCNC: 33.2 G/DL (ref 32–36)
MCV RBC AUTO: 97 FL (ref 82–98)
MONOCYTES # BLD AUTO: 1 K/UL (ref 0.3–1)
MONOCYTES NFR BLD: 7.2 % (ref 4–15)
NEUTROPHILS # BLD AUTO: 10.7 K/UL (ref 1.8–7.7)
NEUTROPHILS NFR BLD: 76.9 % (ref 38–73)
NRBC BLD-RTO: 0 /100 WBC
PHOSPHATE SERPL-MCNC: 2.7 MG/DL (ref 2.7–4.5)
PLATELET # BLD AUTO: 472 K/UL (ref 150–450)
PMV BLD AUTO: 10.3 FL (ref 9.2–12.9)
POTASSIUM SERPL-SCNC: 4.8 MMOL/L (ref 3.5–5.1)
RBC # BLD AUTO: 3.43 M/UL (ref 4–5.4)
SODIUM SERPL-SCNC: 138 MMOL/L (ref 136–145)
TACROLIMUS BLD-MCNC: 15.7 NG/ML (ref 5–15)
WBC # BLD AUTO: 13.95 K/UL (ref 3.9–12.7)

## 2022-11-14 PROCEDURE — 83735 ASSAY OF MAGNESIUM: CPT | Performed by: INTERNAL MEDICINE

## 2022-11-14 PROCEDURE — 83690 ASSAY OF LIPASE: CPT | Performed by: INTERNAL MEDICINE

## 2022-11-14 PROCEDURE — 36415 COLL VENOUS BLD VENIPUNCTURE: CPT | Performed by: INTERNAL MEDICINE

## 2022-11-14 PROCEDURE — 82150 ASSAY OF AMYLASE: CPT | Performed by: INTERNAL MEDICINE

## 2022-11-14 PROCEDURE — 85025 COMPLETE CBC W/AUTO DIFF WBC: CPT | Performed by: INTERNAL MEDICINE

## 2022-11-14 PROCEDURE — 80069 RENAL FUNCTION PANEL: CPT | Performed by: INTERNAL MEDICINE

## 2022-11-14 PROCEDURE — 80197 ASSAY OF TACROLIMUS: CPT | Performed by: INTERNAL MEDICINE

## 2022-11-14 RX ORDER — SODIUM BICARBONATE 650 MG/1
1300 TABLET ORAL 3 TIMES DAILY
Qty: 180 TABLET | Refills: 11 | Status: SHIPPED | OUTPATIENT
Start: 2022-11-14 | End: 2023-01-17 | Stop reason: DRUGHIGH

## 2022-11-14 RX ORDER — TACROLIMUS 1 MG/1
5 CAPSULE ORAL EVERY 12 HOURS
Qty: 360 CAPSULE | Refills: 11 | Status: SHIPPED | OUTPATIENT
Start: 2022-11-14 | End: 2022-11-17 | Stop reason: DRUGHIGH

## 2022-11-14 NOTE — TELEPHONE ENCOUNTER
Patient verbalizes understanding of dosage changes. States she just picked up Magnesium prescription today and has not started taking it yet. Dr. Broussard notified.   Denies any signs or symptoms of infection.      ----- Message from Tammie Broussard DO sent at 11/14/2022  2:18 PM CST -----  Baseline kidney and pancreas graft function   High FK. Decrease FK to 5 BID from 6 BID  Mildly elevated WBC. Any complaints like N/V/UTI symptoms  Increase MgOx to 800 BID   Make sodium bicarb 1300 TID

## 2022-11-16 ENCOUNTER — OFFICE VISIT (OUTPATIENT)
Dept: TRANSPLANT | Facility: CLINIC | Age: 27
End: 2022-11-16
Payer: MEDICARE

## 2022-11-16 VITALS
DIASTOLIC BLOOD PRESSURE: 83 MMHG | TEMPERATURE: 97 F | HEIGHT: 64 IN | HEART RATE: 114 BPM | WEIGHT: 135.38 LBS | OXYGEN SATURATION: 100 % | BODY MASS INDEX: 23.11 KG/M2 | RESPIRATION RATE: 18 BRPM | SYSTOLIC BLOOD PRESSURE: 129 MMHG

## 2022-11-16 DIAGNOSIS — Z94.0 S/P KIDNEY TRANSPLANT: Primary | ICD-10-CM

## 2022-11-16 DIAGNOSIS — E87.20 METABOLIC ACIDOSIS: ICD-10-CM

## 2022-11-16 DIAGNOSIS — Z94.83 STATUS POST PANCREAS TRANSPLANTATION: ICD-10-CM

## 2022-11-16 DIAGNOSIS — E83.42 HYPOMAGNESEMIA: ICD-10-CM

## 2022-11-16 DIAGNOSIS — D84.9 IMMUNOSUPPRESSION: ICD-10-CM

## 2022-11-16 DIAGNOSIS — E83.39 HYPOPHOSPHATEMIA: ICD-10-CM

## 2022-11-16 PROCEDURE — 99999 PR PBB SHADOW E&M-EST. PATIENT-LVL III: ICD-10-PCS | Mod: PBBFAC,,, | Performed by: INTERNAL MEDICINE

## 2022-11-16 PROCEDURE — 99213 OFFICE O/P EST LOW 20 MIN: CPT | Mod: PBBFAC | Performed by: INTERNAL MEDICINE

## 2022-11-16 PROCEDURE — 99215 OFFICE O/P EST HI 40 MIN: CPT | Mod: S$PBB,,, | Performed by: INTERNAL MEDICINE

## 2022-11-16 PROCEDURE — 99999 PR PBB SHADOW E&M-EST. PATIENT-LVL III: CPT | Mod: PBBFAC,,, | Performed by: INTERNAL MEDICINE

## 2022-11-16 PROCEDURE — 99215 PR OFFICE/OUTPT VISIT, EST, LEVL V, 40-54 MIN: ICD-10-PCS | Mod: S$PBB,,, | Performed by: INTERNAL MEDICINE

## 2022-11-16 NOTE — LETTER
November 16, 2022        Tai Camilo  [address]  [facility] [address]  Phone: [phone]  Fax: [phone]             [facility]- Transplant 1st Fl  [address]  [facility] [address]  Phone: [phone]   Patient: Isabela Read   MR Number: [id number]   YOB: 1995   Date of Visit: 11/16/2022       Dear Dr. Tai Camilo    Thank you for referring Isabela Read to me for evaluation. Attached you will find relevant portions of my assessment and plan of care.    If you have questions, please do not hesitate to call me. I look forward to following Isabela Read along with you.    Sincerely,    Tammie Broussard, DO    Enclosure    If you would like to receive this communication electronically, please contact [email or web address] or [phone] to request [employer] Link access.    [employer] Link is a tool which provides read-only access to select patient information with whom you have a relationship. Its easy to use and provides real time access to review your patients record including encounter summaries, notes, results, and demographic information.    If you feel you have received this communication in error or would no longer like to receive these types of communications, please e-mail [email or web address]

## 2022-11-16 NOTE — PROGRESS NOTES
Kidney/Pancreas Post-Transplant Assessment    Referring Physician: Oswald Kruger  Current Nephrologist: Tai Camilo    ORGAN: PANCREAS  Donor Type: donation after brain death  PHS Increased Risk: no  Cold Ischemia: 310 mins  Induction Medications: Thymo    Subjective:     CC:  Reassessment of renal allograft function and management of chronic immunosuppression.    HPI:  Ms. Read is a 27 y.o. year old Black or  female who received a donation after brain death kidney and pancreas transplant on 11/3/22. Patient seen today with her mother in the clinic. Patient today denies any issues. Denies any fever, watery eyes, runny nose. Denies any cough or SOB. Denies any heart burn. Denies any CP or even abdominal pain. States that she is not taking any of her pain medications. Does admit to some loose stools. Denies any dysuria or frequency    SBP at home in low 110's (has not been taking her Procardia 60 mg BID and has been holding her Coreg for SBP less than 125).    DONOR INFORMATION:   18 yr WM without any HTN, DM or CA.  of anoxia. DBD. CIT of 5 hr and 29 min. KDPI of 1 %    Review of Systems   Constitutional:  Negative for activity change, appetite change, chills and fever.   HENT:  Negative for congestion, sneezing and sore throat.    Eyes:  Negative for pain and itching.   Respiratory:  Negative for apnea, cough, shortness of breath and wheezing.    Cardiovascular:  Negative for chest pain.   Gastrointestinal:  Negative for abdominal pain, constipation, diarrhea, nausea and vomiting.   Genitourinary:  Negative for difficulty urinating, dysuria and frequency.   Musculoskeletal:  Negative for back pain, joint swelling and neck pain.   Skin:  Negative for color change.   Neurological:  Negative for dizziness, light-headedness and headaches.   Psychiatric/Behavioral:  Negative for behavioral problems and confusion. The patient is not nervous/anxious.      Objective:   Blood pressure 129/83,  "pulse (!) 114, temperature 97.2 °F (36.2 °C), temperature source Temporal, resp. rate 18, height 5' 4" (1.626 m), weight 61.4 kg (135 lb 5.8 oz), SpO2 100 %.body mass index is 23.23 kg/m².    Physical Exam  Vitals reviewed.   Constitutional:       General: She is not in acute distress.     Appearance: Normal appearance. She is not ill-appearing or toxic-appearing.      Comments: Appears stated age   HENT:      Head: Normocephalic and atraumatic.      Right Ear: External ear normal.      Left Ear: External ear normal.      Nose: Nose normal.   Eyes:      General: No scleral icterus.     Conjunctiva/sclera: Conjunctivae normal.   Cardiovascular:      Rate and Rhythm: Normal rate and regular rhythm.      Pulses: Normal pulses.      Heart sounds: Normal heart sounds. No murmur heard.    No friction rub.   Pulmonary:      Effort: Pulmonary effort is normal. No respiratory distress.      Breath sounds: Normal breath sounds. No wheezing or rhonchi.   Abdominal:      General: Bowel sounds are normal. There is no distension.      Palpations: Abdomen is soft.      Tenderness: There is no abdominal tenderness. There is no guarding.      Comments: Staples midline noted. Non tender   Musculoskeletal:         General: No swelling or deformity. Normal range of motion.      Cervical back: Normal range of motion and neck supple. No tenderness.      Right lower leg: No edema.      Left lower leg: No edema.   Skin:     General: Skin is warm and dry.      Coloration: Skin is not jaundiced.      Findings: No erythema or rash.   Neurological:      General: No focal deficit present.      Mental Status: She is alert and oriented to person, place, and time.      Motor: No weakness.   Psychiatric:         Mood and Affect: Mood normal.         Behavior: Behavior normal.       Labs:  Lab Results   Component Value Date    WBC 13.95 (H) 11/14/2022    HGB 11.1 (L) 11/14/2022    HCT 33.4 (L) 11/14/2022     11/14/2022    K 4.8 11/14/2022    "  (H) 11/14/2022    CO2 18 (L) 11/14/2022    BUN 26 (H) 11/14/2022    CREATININE 1.1 11/14/2022    EGFRNONAA 9.1 (A) 05/25/2022    CALCIUM 10.2 11/14/2022    PHOS 2.7 11/14/2022    MG 1.4 (L) 11/14/2022    ALBUMIN 4.1 11/14/2022    AST 19 11/02/2022    ALT 16 11/02/2022    UTPCR 10.82 (H) 03/05/2021    .7 (H) 11/02/2022    TACROLIMUS 15.7 (H) 11/14/2022       No results found for: EXTANC, EXTWBC, EXTSEGS, EXTPLATELETS, EXTHEMOGLOBI, EXTHEMATOCRI, EXTCREATININ, EXTSODIUM, EXTPOTASSIUM, EXTBUN, EXTCO2, EXTCALCIUM, EXTPHOSPHORU, EXTGLUCOSE, EXTALBUMIN, EXTAST, EXTALT, EXTBILITOTAL, EXTLIPASE, EXTAMYLASE    No results found for: EXTCYCLOSLVL, EXTSIROLIMUS, EXTTACROLVL, EXTPROTCRE, EXTPTHINTACT, EXTPROTEINUA, EXTWBCUA, EXTRBCUA    Labs were reviewed with the patient    Assessment and Plan   27 yr old AAF with type I DM s/p a simultaneous kidney and pancreas transplant on 11/3/22. 0% PRA. Thymo induction:     1) s/p simultaneous kidney and pancreas transplant:   - pancreatic function near baseline   - mild elevation in kidney function noted with specific gravity of 1.02 with WBC/Hgb/platelets all elevated concerning for dehydration. Discussed with patient regarding increasing PO intake   - high FK. Decreased FK to 5 mg BID   - cont on MMF 1 gram BID   - on prednisone taper   - cont on valcyte, bactrim and nystatin   - staple and ureteral stent removal scheduled   - cont on ASA 81 mg daily  2) HTN:   - concern for hypotension   - advised to hold Coreg and Procardia schd. Check BP TID and take Procardia 30 mg for SBP> 150  3) Metabolic acidosis:   - cont on sodium bicarb 1300 TID  4) Hypophosphatemia:   - improved. Cont on Kphos 500 BID   5) Hypomagnesmemia:   - cont on MgOx 400 mg BID  6) hx of type I DM with retinopathy  7) Elevated LFT:   - followed by hepatology in the past with fibroscan performed concerning for fibrosis with MRI elastogram suspicious for iron overload     Labs biweekly  RTC in 2  ravinder Broussard, DO   Transplant Nephrology     Education:   Material provided to the patient.  Patient reminded to call with any health changes since these can be early signs of significant complications.  Also, I advised the patient to be sure any new medications or changes of old medications are discussed with either a pharmacist or physician knowledgeable with transplant to avoid rejection/drug toxicity related to significant drug interactions.    Patient advised that it is recommended that all transplanted patients, and their close contacts and household members receive Covid vaccination.

## 2022-11-17 ENCOUNTER — PATIENT MESSAGE (OUTPATIENT)
Dept: TRANSPLANT | Facility: CLINIC | Age: 27
End: 2022-11-17
Payer: MEDICARE

## 2022-11-17 ENCOUNTER — LAB VISIT (OUTPATIENT)
Dept: LAB | Facility: HOSPITAL | Age: 27
End: 2022-11-17
Attending: INTERNAL MEDICINE
Payer: MEDICARE

## 2022-11-17 DIAGNOSIS — Z94.83 STATUS POST SIMULTANEOUS KIDNEY AND PANCREAS TRANSPLANT: ICD-10-CM

## 2022-11-17 DIAGNOSIS — Z94.83 STATUS POST PANCREAS TRANSPLANTATION: ICD-10-CM

## 2022-11-17 DIAGNOSIS — Z94.83 PANCREAS REPLACED BY TRANSPLANT: ICD-10-CM

## 2022-11-17 DIAGNOSIS — Z94.0 STATUS POST SIMULTANEOUS KIDNEY AND PANCREAS TRANSPLANT: ICD-10-CM

## 2022-11-17 DIAGNOSIS — Z94.0 KIDNEY REPLACED BY TRANSPLANT: ICD-10-CM

## 2022-11-17 LAB
ALBUMIN SERPL BCP-MCNC: 4.1 G/DL (ref 3.5–5.2)
AMYLASE SERPL-CCNC: 106 U/L (ref 20–110)
ANION GAP SERPL CALC-SCNC: 4 MMOL/L (ref 8–16)
BASOPHILS # BLD AUTO: 0.04 K/UL (ref 0–0.2)
BASOPHILS NFR BLD: 0.4 % (ref 0–1.9)
BUN SERPL-MCNC: 18 MG/DL (ref 6–20)
CALCIUM SERPL-MCNC: 9.8 MG/DL (ref 8.7–10.5)
CHLORIDE SERPL-SCNC: 113 MMOL/L (ref 95–110)
CO2 SERPL-SCNC: 22 MMOL/L (ref 23–29)
CREAT SERPL-MCNC: 0.9 MG/DL (ref 0.5–1.4)
DIFFERENTIAL METHOD: ABNORMAL
EOSINOPHIL # BLD AUTO: 0.3 K/UL (ref 0–0.5)
EOSINOPHIL NFR BLD: 2.4 % (ref 0–8)
ERYTHROCYTE [DISTWIDTH] IN BLOOD BY AUTOMATED COUNT: 14.9 % (ref 11.5–14.5)
EST. GFR  (NO RACE VARIABLE): >60 ML/MIN/1.73 M^2
GLUCOSE SERPL-MCNC: 82 MG/DL (ref 70–110)
HCT VFR BLD AUTO: 30.8 % (ref 37–48.5)
HGB BLD-MCNC: 10.5 G/DL (ref 12–16)
IMM GRANULOCYTES # BLD AUTO: 0.08 K/UL (ref 0–0.04)
IMM GRANULOCYTES NFR BLD AUTO: 0.8 % (ref 0–0.5)
LIPASE SERPL-CCNC: 27 U/L (ref 4–60)
LYMPHOCYTES # BLD AUTO: 1.5 K/UL (ref 1–4.8)
LYMPHOCYTES NFR BLD: 14 % (ref 18–48)
MAGNESIUM SERPL-MCNC: 1.6 MG/DL (ref 1.6–2.6)
MCH RBC QN AUTO: 32.3 PG (ref 27–31)
MCHC RBC AUTO-ENTMCNC: 34.1 G/DL (ref 32–36)
MCV RBC AUTO: 95 FL (ref 82–98)
MONOCYTES # BLD AUTO: 0.7 K/UL (ref 0.3–1)
MONOCYTES NFR BLD: 6.9 % (ref 4–15)
NEUTROPHILS # BLD AUTO: 8 K/UL (ref 1.8–7.7)
NEUTROPHILS NFR BLD: 75.5 % (ref 38–73)
NRBC BLD-RTO: 0 /100 WBC
PHOSPHATE SERPL-MCNC: 2.2 MG/DL (ref 2.7–4.5)
PLATELET # BLD AUTO: 404 K/UL (ref 150–450)
PMV BLD AUTO: 10.2 FL (ref 9.2–12.9)
POTASSIUM SERPL-SCNC: 4.6 MMOL/L (ref 3.5–5.1)
RBC # BLD AUTO: 3.25 M/UL (ref 4–5.4)
SODIUM SERPL-SCNC: 139 MMOL/L (ref 136–145)
TACROLIMUS BLD-MCNC: 9.7 NG/ML (ref 5–15)
WBC # BLD AUTO: 10.51 K/UL (ref 3.9–12.7)

## 2022-11-17 PROCEDURE — 83735 ASSAY OF MAGNESIUM: CPT | Performed by: INTERNAL MEDICINE

## 2022-11-17 PROCEDURE — 85025 COMPLETE CBC W/AUTO DIFF WBC: CPT | Performed by: INTERNAL MEDICINE

## 2022-11-17 PROCEDURE — 83690 ASSAY OF LIPASE: CPT | Performed by: INTERNAL MEDICINE

## 2022-11-17 PROCEDURE — 36415 COLL VENOUS BLD VENIPUNCTURE: CPT | Performed by: INTERNAL MEDICINE

## 2022-11-17 PROCEDURE — 80069 RENAL FUNCTION PANEL: CPT | Performed by: INTERNAL MEDICINE

## 2022-11-17 PROCEDURE — 80197 ASSAY OF TACROLIMUS: CPT | Performed by: INTERNAL MEDICINE

## 2022-11-17 PROCEDURE — 82150 ASSAY OF AMYLASE: CPT | Performed by: INTERNAL MEDICINE

## 2022-11-17 RX ORDER — TACROLIMUS 1 MG/1
CAPSULE ORAL
Qty: 360 CAPSULE | Refills: 11 | Status: ON HOLD | OUTPATIENT
Start: 2022-11-17 | End: 2022-11-28 | Stop reason: SDUPTHER

## 2022-11-17 NOTE — TELEPHONE ENCOUNTER
Voicemail left for patient as well as message sent to patient via [facility] with medication adjustment.    ----- Message from Tammie Broussard DO sent at 11/17/2022 11:47 AM CST -----  Baseline kidney and pancreas graft function  FK low for fresh SPK. Increase FK to 6/5 from 5 BID  Improved Mg

## 2022-11-18 DIAGNOSIS — D72.829 LEUKOCYTOSIS, UNSPECIFIED TYPE: ICD-10-CM

## 2022-11-18 DIAGNOSIS — Z94.0 KIDNEY REPLACED BY TRANSPLANT: Primary | ICD-10-CM

## 2022-11-21 ENCOUNTER — LAB VISIT (OUTPATIENT)
Dept: LAB | Facility: HOSPITAL | Age: 27
End: 2022-11-21
Attending: INTERNAL MEDICINE
Payer: MEDICARE

## 2022-11-21 DIAGNOSIS — Z94.0 STATUS POST SIMULTANEOUS KIDNEY AND PANCREAS TRANSPLANT: ICD-10-CM

## 2022-11-21 DIAGNOSIS — Z94.83 STATUS POST PANCREAS TRANSPLANTATION: ICD-10-CM

## 2022-11-21 DIAGNOSIS — Z94.83 STATUS POST SIMULTANEOUS KIDNEY AND PANCREAS TRANSPLANT: ICD-10-CM

## 2022-11-21 DIAGNOSIS — Z94.0 KIDNEY REPLACED BY TRANSPLANT: ICD-10-CM

## 2022-11-21 LAB
ALBUMIN SERPL BCP-MCNC: 4.1 G/DL (ref 3.5–5.2)
AMYLASE SERPL-CCNC: 119 U/L (ref 20–110)
ANION GAP SERPL CALC-SCNC: 7 MMOL/L (ref 8–16)
BASOPHILS # BLD AUTO: 0.03 K/UL (ref 0–0.2)
BASOPHILS NFR BLD: 0.3 % (ref 0–1.9)
BUN SERPL-MCNC: 13 MG/DL (ref 6–20)
CALCIUM SERPL-MCNC: 10.2 MG/DL (ref 8.7–10.5)
CHLORIDE SERPL-SCNC: 110 MMOL/L (ref 95–110)
CO2 SERPL-SCNC: 19 MMOL/L (ref 23–29)
CREAT SERPL-MCNC: 0.9 MG/DL (ref 0.5–1.4)
DIFFERENTIAL METHOD: ABNORMAL
EOSINOPHIL # BLD AUTO: 0.3 K/UL (ref 0–0.5)
EOSINOPHIL NFR BLD: 3.6 % (ref 0–8)
ERYTHROCYTE [DISTWIDTH] IN BLOOD BY AUTOMATED COUNT: 15.3 % (ref 11.5–14.5)
EST. GFR  (NO RACE VARIABLE): >60 ML/MIN/1.73 M^2
GLUCOSE SERPL-MCNC: 85 MG/DL (ref 70–110)
HCT VFR BLD AUTO: 32.8 % (ref 37–48.5)
HGB BLD-MCNC: 10.8 G/DL (ref 12–16)
IMM GRANULOCYTES # BLD AUTO: 0.03 K/UL (ref 0–0.04)
IMM GRANULOCYTES NFR BLD AUTO: 0.3 % (ref 0–0.5)
LIPASE SERPL-CCNC: 21 U/L (ref 4–60)
LYMPHOCYTES # BLD AUTO: 0.8 K/UL (ref 1–4.8)
LYMPHOCYTES NFR BLD: 8.8 % (ref 18–48)
MAGNESIUM SERPL-MCNC: 1.5 MG/DL (ref 1.6–2.6)
MCH RBC QN AUTO: 32 PG (ref 27–31)
MCHC RBC AUTO-ENTMCNC: 32.9 G/DL (ref 32–36)
MCV RBC AUTO: 97 FL (ref 82–98)
MONOCYTES # BLD AUTO: 0.5 K/UL (ref 0.3–1)
MONOCYTES NFR BLD: 5.7 % (ref 4–15)
NEUTROPHILS # BLD AUTO: 7.1 K/UL (ref 1.8–7.7)
NEUTROPHILS NFR BLD: 81.3 % (ref 38–73)
NRBC BLD-RTO: 0 /100 WBC
PHOSPHATE SERPL-MCNC: 1.7 MG/DL (ref 2.7–4.5)
PLATELET # BLD AUTO: 323 K/UL (ref 150–450)
PMV BLD AUTO: 10.8 FL (ref 9.2–12.9)
POTASSIUM SERPL-SCNC: 4.6 MMOL/L (ref 3.5–5.1)
RBC # BLD AUTO: 3.38 M/UL (ref 4–5.4)
SODIUM SERPL-SCNC: 136 MMOL/L (ref 136–145)
TACROLIMUS BLD-MCNC: 8.4 NG/ML (ref 5–15)
WBC # BLD AUTO: 8.77 K/UL (ref 3.9–12.7)

## 2022-11-21 PROCEDURE — 80197 ASSAY OF TACROLIMUS: CPT | Performed by: INTERNAL MEDICINE

## 2022-11-21 PROCEDURE — 36415 COLL VENOUS BLD VENIPUNCTURE: CPT | Performed by: INTERNAL MEDICINE

## 2022-11-21 PROCEDURE — 83690 ASSAY OF LIPASE: CPT | Performed by: INTERNAL MEDICINE

## 2022-11-21 PROCEDURE — 80069 RENAL FUNCTION PANEL: CPT | Performed by: INTERNAL MEDICINE

## 2022-11-21 PROCEDURE — 83735 ASSAY OF MAGNESIUM: CPT | Performed by: INTERNAL MEDICINE

## 2022-11-21 PROCEDURE — 82150 ASSAY OF AMYLASE: CPT | Performed by: INTERNAL MEDICINE

## 2022-11-21 PROCEDURE — 85025 COMPLETE CBC W/AUTO DIFF WBC: CPT | Performed by: INTERNAL MEDICINE

## 2022-11-21 NOTE — PROGRESS NOTES
She has low PO4: is she on IV PHos as needed? Please Kphos to 3 pills TID X 4 days then 3 pills BID.     Amylase is slightly high but lipase normal : does she have abd pain? Please repeat lab on Friday

## 2022-11-21 NOTE — TELEPHONE ENCOUNTER
Patient verbalizes understanding of dosage change. Denies any abdominal pain.    ----- Message from Melani Mcintyre MD sent at 11/21/2022 10:57 AM CST -----  She has low PO4: is she on IV PHos as needed? Please Kphos to 3 pills TID X 4 days then 3 pills BID.     Amylase is slightly high but lipase normal : does she have abd pain? Please repeat lab on Friday

## 2022-11-22 ENCOUNTER — TELEPHONE (OUTPATIENT)
Dept: TRANSPLANT | Facility: CLINIC | Age: 27
End: 2022-11-22
Payer: MEDICARE

## 2022-11-22 DIAGNOSIS — Z94.0 KIDNEY REPLACED BY TRANSPLANT: Primary | ICD-10-CM

## 2022-11-22 NOTE — TELEPHONE ENCOUNTER
Patient c/o burning on urination this morning. U/A submitted yesterday with lab work showed +1 leukocytes and trace occult blood. Requested patient to submit another urine sample today for u/a reflex to culture. Patient verbalizes understanding. Message sent to transplant nephrologist.

## 2022-11-23 ENCOUNTER — TELEPHONE (OUTPATIENT)
Dept: TRANSPLANT | Facility: CLINIC | Age: 27
End: 2022-11-23
Payer: MEDICARE

## 2022-11-23 ENCOUNTER — PATIENT MESSAGE (OUTPATIENT)
Dept: TRANSPLANT | Facility: CLINIC | Age: 27
End: 2022-11-23
Payer: MEDICARE

## 2022-11-23 DIAGNOSIS — N39.0 URINARY TRACT INFECTION WITHOUT HEMATURIA, SITE UNSPECIFIED: Primary | ICD-10-CM

## 2022-11-23 NOTE — TELEPHONE ENCOUNTER
Left voice message to call coordinator back.  ----- Message from Melani Mcintyre MD sent at 11/23/2022 11:11 AM CST -----  Her stent is out?    Please start cipro 250 mg BID X 5 days after UA and urine culture submitted   ----- Message -----  From: Klaus Mckinney RN  Sent: 11/23/2022  11:10 AM CST  To: Melani Mcintyre MD    Still S/S she is submitting a CCMS for a U/A and C&S.  ----- Message -----  From: Melani Mcintyre MD  Sent: 11/23/2022  10:42 AM CST  To: Renée Green RN, Tammie Broussard, DO    How is her symtoms today? Did she submit her urine for culture?   ----- Message -----  From: Renée Green RN  Sent: 11/22/2022   3:44 PM CST  To: Tammie Broussard, DO    Patient submitted u/a yesterday. Today she said she does have some burning on urination. I asked her to submit another urine specimen today.     ----- Message -----  From: [facility]  Sent: 11/21/2022  11:56 AM CST  To: Renée Green RN

## 2022-11-23 NOTE — TELEPHONE ENCOUNTER
Patient repeated back and voice a understanding of orders.  States she is on her way now to submit CCMS Urine.  ----- Message from Melani Mcintyre MD sent at 11/23/2022 11:11 AM CST -----  Her stent is out?    Please start cipro 250 mg BID X 5 days after UA and urine culture submitted   ----- Message -----  From: Klaus Mckinney RN  Sent: 11/23/2022  11:10 AM CST  To: Melani Mcintyre MD    Still S/S she is submitting a CCMS for a U/A and C&S.  ----- Message -----  From: Melani Mcintyre MD  Sent: 11/23/2022  10:42 AM CST  To: Renée Green RN, Tammie Broussard, DO    How is her symtoms today? Did she submit her urine for culture?   ----- Message -----  From: Renée Green RN  Sent: 11/22/2022   3:44 PM CST  To: Tammie Broussard, DO    Patient submitted u/a yesterday. Today she said she does have some burning on urination. I asked her to submit another urine specimen today.     ----- Message -----  From: [facility]  Sent: 11/21/2022  11:56 AM CST  To: Renée Green RN

## 2022-11-25 ENCOUNTER — HOSPITAL ENCOUNTER (INPATIENT)
Facility: HOSPITAL | Age: 27
LOS: 3 days | Discharge: HOME | DRG: 698 | End: 2022-11-28
Attending: EMERGENCY MEDICINE | Admitting: INTERNAL MEDICINE
Payer: MEDICARE

## 2022-11-25 DIAGNOSIS — Z29.89 PROPHYLACTIC IMMUNOTHERAPY: ICD-10-CM

## 2022-11-25 DIAGNOSIS — Z94.83 STATUS POST SIMULTANEOUS KIDNEY AND PANCREAS TRANSPLANT: ICD-10-CM

## 2022-11-25 DIAGNOSIS — D63.8 ANEMIA OF CHRONIC DISEASE: ICD-10-CM

## 2022-11-25 DIAGNOSIS — N10 PYELONEPHRITIS, ACUTE: ICD-10-CM

## 2022-11-25 DIAGNOSIS — Z94.0 STATUS POST DECEASED-DONOR KIDNEY TRANSPLANTATION: ICD-10-CM

## 2022-11-25 DIAGNOSIS — E87.5 HYPERKALEMIA: ICD-10-CM

## 2022-11-25 DIAGNOSIS — N17.9 AKI (ACUTE KIDNEY INJURY): ICD-10-CM

## 2022-11-25 DIAGNOSIS — R65.20 SEPSIS WITH ACUTE RENAL FAILURE WITHOUT SEPTIC SHOCK, DUE TO UNSPECIFIED ORGANISM, UNSPECIFIED ACUTE RENAL FAILURE TYPE: ICD-10-CM

## 2022-11-25 DIAGNOSIS — Z94.83 STATUS POST PANCREAS TRANSPLANTATION: ICD-10-CM

## 2022-11-25 DIAGNOSIS — E87.20 METABOLIC ACIDOSIS: ICD-10-CM

## 2022-11-25 DIAGNOSIS — N17.9 SEPSIS WITH ACUTE RENAL FAILURE WITHOUT SEPTIC SHOCK, DUE TO UNSPECIFIED ORGANISM, UNSPECIFIED ACUTE RENAL FAILURE TYPE: ICD-10-CM

## 2022-11-25 DIAGNOSIS — F32.5 MAJOR DEPRESSIVE DISORDER WITH SINGLE EPISODE, IN FULL REMISSION: ICD-10-CM

## 2022-11-25 DIAGNOSIS — Z94.0 STATUS POST SIMULTANEOUS KIDNEY AND PANCREAS TRANSPLANT: ICD-10-CM

## 2022-11-25 DIAGNOSIS — R00.0 TACHYCARDIA: ICD-10-CM

## 2022-11-25 DIAGNOSIS — Z79.60 LONG-TERM USE OF IMMUNOSUPPRESSANT MEDICATION: ICD-10-CM

## 2022-11-25 DIAGNOSIS — Z94.0 IMMUNOSUPPRESSIVE MANAGEMENT ENCOUNTER FOLLOWING KIDNEY TRANSPLANT: ICD-10-CM

## 2022-11-25 DIAGNOSIS — Z79.899 IMMUNOSUPPRESSIVE MANAGEMENT ENCOUNTER FOLLOWING KIDNEY TRANSPLANT: ICD-10-CM

## 2022-11-25 DIAGNOSIS — A41.9 SEPSIS WITH ACUTE RENAL FAILURE WITHOUT SEPTIC SHOCK, DUE TO UNSPECIFIED ORGANISM, UNSPECIFIED ACUTE RENAL FAILURE TYPE: ICD-10-CM

## 2022-11-25 DIAGNOSIS — I15.0 RENOVASCULAR HYPERTENSION: ICD-10-CM

## 2022-11-25 DIAGNOSIS — N30.00 ACUTE CYSTITIS WITHOUT HEMATURIA: Primary | ICD-10-CM

## 2022-11-25 DIAGNOSIS — A41.9 SEPSIS: ICD-10-CM

## 2022-11-25 DIAGNOSIS — Z91.89 AT RISK FOR OPPORTUNISTIC INFECTIONS: ICD-10-CM

## 2022-11-25 DIAGNOSIS — R50.9 FEVER: ICD-10-CM

## 2022-11-25 PROBLEM — D62 ACUTE BLOOD LOSS ANEMIA: Status: RESOLVED | Noted: 2022-11-07 | Resolved: 2022-11-25

## 2022-11-25 PROBLEM — J96.21 ACUTE AND CHRONIC RESPIRATORY FAILURE WITH HYPOXIA: Status: RESOLVED | Noted: 2022-02-04 | Resolved: 2022-11-25

## 2022-11-25 PROBLEM — R80.9 PROTEINURIA, UNSPECIFIED: Status: RESOLVED | Noted: 2021-05-28 | Resolved: 2022-11-25

## 2022-11-25 PROBLEM — R33.9 URINARY RETENTION: Status: RESOLVED | Noted: 2022-11-07 | Resolved: 2022-11-25

## 2022-11-25 PROBLEM — M89.9 CHRONIC KIDNEY DISEASE-MINERAL AND BONE DISORDER: Status: RESOLVED | Noted: 2022-09-14 | Resolved: 2022-11-25

## 2022-11-25 PROBLEM — Z99.2 ANEMIA DUE TO CHRONIC KIDNEY DISEASE, ON CHRONIC DIALYSIS: Status: RESOLVED | Noted: 2022-01-18 | Resolved: 2022-11-25

## 2022-11-25 PROBLEM — E10.649 TYPE 1 DIABETES MELLITUS WITH HYPOGLYCEMIA WITHOUT COMA: Status: RESOLVED | Noted: 2021-05-28 | Resolved: 2022-11-25

## 2022-11-25 PROBLEM — R76.11 NONSPECIFIC REACTION TO TUBERCULIN SKIN TEST WITHOUT ACTIVE TUBERCULOSIS: Status: RESOLVED | Noted: 2021-10-01 | Resolved: 2022-11-25

## 2022-11-25 PROBLEM — J81.0 FLASH PULMONARY EDEMA: Status: RESOLVED | Noted: 2021-09-18 | Resolved: 2022-11-25

## 2022-11-25 PROBLEM — Z99.2 ESRD ON HEMODIALYSIS: Status: RESOLVED | Noted: 2022-09-14 | Resolved: 2022-11-25

## 2022-11-25 PROBLEM — N18.9 CHRONIC KIDNEY DISEASE-MINERAL AND BONE DISORDER: Status: RESOLVED | Noted: 2022-09-14 | Resolved: 2022-11-25

## 2022-11-25 PROBLEM — D68.8 OTHER SPECIFIED COAGULATION DEFECTS: Status: RESOLVED | Noted: 2021-05-31 | Resolved: 2022-11-25

## 2022-11-25 PROBLEM — G44.1 VASCULAR HEADACHE, NOT ELSEWHERE CLASSIFIED: Status: RESOLVED | Noted: 2021-05-31 | Resolved: 2022-11-25

## 2022-11-25 PROBLEM — N18.6 ANEMIA DUE TO CHRONIC KIDNEY DISEASE, ON CHRONIC DIALYSIS: Status: RESOLVED | Noted: 2022-01-18 | Resolved: 2022-11-25

## 2022-11-25 PROBLEM — E44.0 MODERATE PROTEIN-CALORIE MALNUTRITION: Status: RESOLVED | Noted: 2021-06-21 | Resolved: 2022-11-25

## 2022-11-25 PROBLEM — E10.43: Status: RESOLVED | Noted: 2021-05-28 | Resolved: 2022-11-25

## 2022-11-25 PROBLEM — E10.65 HYPERGLYCEMIA DUE TO TYPE 1 DIABETES MELLITUS: Status: RESOLVED | Noted: 2021-03-04 | Resolved: 2022-11-25

## 2022-11-25 PROBLEM — N18.6 ESRD ON HEMODIALYSIS: Status: RESOLVED | Noted: 2022-09-14 | Resolved: 2022-11-25

## 2022-11-25 PROBLEM — T82.898A ARTERIOVENOUS FISTULA OCCLUSION: Status: RESOLVED | Noted: 2021-08-11 | Resolved: 2022-11-25

## 2022-11-25 PROBLEM — E83.9 CHRONIC KIDNEY DISEASE-MINERAL AND BONE DISORDER: Status: RESOLVED | Noted: 2022-09-14 | Resolved: 2022-11-25

## 2022-11-25 PROBLEM — E11.649 HYPOGLYCEMIA ASSOCIATED WITH DIABETES: Status: RESOLVED | Noted: 2022-09-25 | Resolved: 2022-11-25

## 2022-11-25 PROBLEM — N19 UREMIA: Status: RESOLVED | Noted: 2020-07-29 | Resolved: 2022-11-25

## 2022-11-25 PROBLEM — R60.1 ANASARCA: Status: RESOLVED | Noted: 2021-03-04 | Resolved: 2022-11-25

## 2022-11-25 PROBLEM — Z99.2 DEPENDENCE ON RENAL DIALYSIS: Status: RESOLVED | Noted: 2021-05-28 | Resolved: 2022-11-25

## 2022-11-25 PROBLEM — E88.09 HYPOALBUMINEMIA: Status: RESOLVED | Noted: 2021-03-05 | Resolved: 2022-11-25

## 2022-11-25 PROBLEM — I12.0 HYPERTENSIVE CHRONIC KIDNEY DISEASE WITH STAGE 5 CHRONIC KIDNEY DISEASE OR END STAGE RENAL DISEASE: Status: RESOLVED | Noted: 2021-05-31 | Resolved: 2022-11-25

## 2022-11-25 PROBLEM — D63.1 ANEMIA DUE TO CHRONIC KIDNEY DISEASE, ON CHRONIC DIALYSIS: Status: RESOLVED | Noted: 2022-01-18 | Resolved: 2022-11-25

## 2022-11-25 PROBLEM — I16.0 HYPERTENSIVE URGENCY: Status: RESOLVED | Noted: 2022-01-18 | Resolved: 2022-11-25

## 2022-11-25 LAB
ALBUMIN SERPL BCP-MCNC: 3.7 G/DL (ref 3.5–5.2)
ALP SERPL-CCNC: 100 U/L (ref 55–135)
ALT SERPL W/O P-5'-P-CCNC: 20 U/L (ref 10–44)
ANION GAP SERPL CALC-SCNC: 16 MMOL/L (ref 8–16)
ANION GAP SERPL CALC-SCNC: 19 MMOL/L (ref 8–16)
AST SERPL-CCNC: 25 U/L (ref 10–40)
B-HCG UR QL: NEGATIVE
BACTERIA #/AREA URNS AUTO: ABNORMAL /HPF
BASOPHILS # BLD AUTO: 0.02 K/UL (ref 0–0.2)
BASOPHILS NFR BLD: 0.2 % (ref 0–1.9)
BILIRUB SERPL-MCNC: 0.3 MG/DL (ref 0.1–1)
BILIRUB UR QL STRIP: NEGATIVE
BUN SERPL-MCNC: 38 MG/DL (ref 6–20)
BUN SERPL-MCNC: 43 MG/DL (ref 6–20)
CALCIUM SERPL-MCNC: 7.7 MG/DL (ref 8.7–10.5)
CALCIUM SERPL-MCNC: 8.4 MG/DL (ref 8.7–10.5)
CHLORIDE SERPL-SCNC: 102 MMOL/L (ref 95–110)
CHLORIDE SERPL-SCNC: 105 MMOL/L (ref 95–110)
CLARITY UR REFRACT.AUTO: CLEAR
CO2 SERPL-SCNC: 14 MMOL/L (ref 23–29)
CO2 SERPL-SCNC: 17 MMOL/L (ref 23–29)
COLOR UR AUTO: YELLOW
CREAT SERPL-MCNC: 4.5 MG/DL (ref 0.5–1.4)
CREAT SERPL-MCNC: 5.2 MG/DL (ref 0.5–1.4)
CTP QC/QA: YES
DIFFERENTIAL METHOD: ABNORMAL
EOSINOPHIL # BLD AUTO: 0.2 K/UL (ref 0–0.5)
EOSINOPHIL NFR BLD: 1.7 % (ref 0–8)
ERYTHROCYTE [DISTWIDTH] IN BLOOD BY AUTOMATED COUNT: 15.4 % (ref 11.5–14.5)
EST. GFR  (NO RACE VARIABLE): 11 ML/MIN/1.73 M^2
EST. GFR  (NO RACE VARIABLE): 13 ML/MIN/1.73 M^2
GLUCOSE SERPL-MCNC: 104 MG/DL (ref 70–110)
GLUCOSE SERPL-MCNC: 125 MG/DL (ref 70–110)
GLUCOSE UR QL STRIP: NEGATIVE
HCT VFR BLD AUTO: 27.9 % (ref 37–48.5)
HGB BLD-MCNC: 9.3 G/DL (ref 12–16)
HGB UR QL STRIP: ABNORMAL
HYALINE CASTS UR QL AUTO: 0 /LPF
IMM GRANULOCYTES # BLD AUTO: 0.06 K/UL (ref 0–0.04)
IMM GRANULOCYTES NFR BLD AUTO: 0.6 % (ref 0–0.5)
INFLUENZA A, MOLECULAR: NOT DETECTED
INFLUENZA B, MOLECULAR: NOT DETECTED
KETONES UR QL STRIP: NEGATIVE
LACTATE SERPL-SCNC: 0.7 MMOL/L (ref 0.5–2.2)
LACTATE SERPL-SCNC: 1 MMOL/L (ref 0.5–2.2)
LEUKOCYTE ESTERASE UR QL STRIP: ABNORMAL
LIPASE SERPL-CCNC: 16 U/L (ref 4–60)
LYMPHOCYTES # BLD AUTO: 0.3 K/UL (ref 1–4.8)
LYMPHOCYTES NFR BLD: 3 % (ref 18–48)
MAGNESIUM SERPL-MCNC: 1.7 MG/DL (ref 1.6–2.6)
MCH RBC QN AUTO: 31.7 PG (ref 27–31)
MCHC RBC AUTO-ENTMCNC: 33.3 G/DL (ref 32–36)
MCV RBC AUTO: 95 FL (ref 82–98)
MICROSCOPIC COMMENT: ABNORMAL
MONOCYTES # BLD AUTO: 0.4 K/UL (ref 0.3–1)
MONOCYTES NFR BLD: 3.3 % (ref 4–15)
NEUTROPHILS # BLD AUTO: 9.9 K/UL (ref 1.8–7.7)
NEUTROPHILS NFR BLD: 91.2 % (ref 38–73)
NITRITE UR QL STRIP: NEGATIVE
NRBC BLD-RTO: 0 /100 WBC
PH UR STRIP: 8 [PH] (ref 5–8)
PHOSPHATE SERPL-MCNC: 5.1 MG/DL (ref 2.7–4.5)
PLATELET # BLD AUTO: 336 K/UL (ref 150–450)
PMV BLD AUTO: 10.9 FL (ref 9.2–12.9)
POTASSIUM SERPL-SCNC: 4.6 MMOL/L (ref 3.5–5.1)
POTASSIUM SERPL-SCNC: 5.5 MMOL/L (ref 3.5–5.1)
PROT SERPL-MCNC: 8 G/DL (ref 6–8.4)
PROT UR QL STRIP: ABNORMAL
RBC # BLD AUTO: 2.93 M/UL (ref 4–5.4)
RBC #/AREA URNS AUTO: 7 /HPF (ref 0–4)
RSV AG BY MOLECULAR METHOD: NOT DETECTED
SARS-COV-2 RNA RESP QL NAA+PROBE: NOT DETECTED
SODIUM SERPL-SCNC: 135 MMOL/L (ref 136–145)
SODIUM SERPL-SCNC: 138 MMOL/L (ref 136–145)
SP GR UR STRIP: 1.01 (ref 1–1.03)
SQUAMOUS #/AREA URNS AUTO: 2 /HPF
TACROLIMUS BLD-MCNC: 5.1 NG/ML (ref 5–15)
URN SPEC COLLECT METH UR: ABNORMAL
WBC # BLD AUTO: 10.84 K/UL (ref 3.9–12.7)
WBC #/AREA URNS AUTO: 58 /HPF (ref 0–5)
WBC CLUMPS UR QL AUTO: ABNORMAL

## 2022-11-25 PROCEDURE — 63600175 PHARM REV CODE 636 W HCPCS: Performed by: PHYSICIAN ASSISTANT

## 2022-11-25 PROCEDURE — 36415 COLL VENOUS BLD VENIPUNCTURE: CPT | Performed by: NURSE PRACTITIONER

## 2022-11-25 PROCEDURE — 36415 COLL VENOUS BLD VENIPUNCTURE: CPT | Performed by: EMERGENCY MEDICINE

## 2022-11-25 PROCEDURE — 25000003 PHARM REV CODE 250: Performed by: PHYSICIAN ASSISTANT

## 2022-11-25 PROCEDURE — 80048 BASIC METABOLIC PNL TOTAL CA: CPT | Mod: XB | Performed by: NURSE PRACTITIONER

## 2022-11-25 PROCEDURE — 87799 DETECT AGENT NOS DNA QUANT: CPT | Performed by: CLINICAL NURSE SPECIALIST

## 2022-11-25 PROCEDURE — 83605 ASSAY OF LACTIC ACID: CPT | Performed by: EMERGENCY MEDICINE

## 2022-11-25 PROCEDURE — 25000003 PHARM REV CODE 250: Performed by: NURSE PRACTITIONER

## 2022-11-25 PROCEDURE — 94761 N-INVAS EAR/PLS OXIMETRY MLT: CPT

## 2022-11-25 PROCEDURE — 99291 PR CRITICAL CARE, E/M 30-74 MINUTES: ICD-10-PCS | Mod: CS,,, | Performed by: EMERGENCY MEDICINE

## 2022-11-25 PROCEDURE — 87086 URINE CULTURE/COLONY COUNT: CPT | Performed by: EMERGENCY MEDICINE

## 2022-11-25 PROCEDURE — 83690 ASSAY OF LIPASE: CPT | Performed by: STUDENT IN AN ORGANIZED HEALTH CARE EDUCATION/TRAINING PROGRAM

## 2022-11-25 PROCEDURE — 20600001 HC STEP DOWN PRIVATE ROOM

## 2022-11-25 PROCEDURE — 93005 ELECTROCARDIOGRAM TRACING: CPT

## 2022-11-25 PROCEDURE — 99285 EMERGENCY DEPT VISIT HI MDM: CPT | Mod: 25

## 2022-11-25 PROCEDURE — 99223 1ST HOSP IP/OBS HIGH 75: CPT | Mod: AI,,, | Performed by: PHYSICIAN ASSISTANT

## 2022-11-25 PROCEDURE — 84100 ASSAY OF PHOSPHORUS: CPT | Performed by: EMERGENCY MEDICINE

## 2022-11-25 PROCEDURE — 80197 ASSAY OF TACROLIMUS: CPT | Performed by: STUDENT IN AN ORGANIZED HEALTH CARE EDUCATION/TRAINING PROGRAM

## 2022-11-25 PROCEDURE — 99291 CRITICAL CARE FIRST HOUR: CPT | Mod: CS,,, | Performed by: EMERGENCY MEDICINE

## 2022-11-25 PROCEDURE — 63600175 PHARM REV CODE 636 W HCPCS: Performed by: STUDENT IN AN ORGANIZED HEALTH CARE EDUCATION/TRAINING PROGRAM

## 2022-11-25 PROCEDURE — 81025 URINE PREGNANCY TEST: CPT | Performed by: STUDENT IN AN ORGANIZED HEALTH CARE EDUCATION/TRAINING PROGRAM

## 2022-11-25 PROCEDURE — 93010 ELECTROCARDIOGRAM REPORT: CPT | Mod: ,,, | Performed by: INTERNAL MEDICINE

## 2022-11-25 PROCEDURE — 0241U SARS-COV2 (COVID) WITH FLU/RSV BY PCR: CPT | Performed by: EMERGENCY MEDICINE

## 2022-11-25 PROCEDURE — 99223 PR INITIAL HOSPITAL CARE,LEVL III: ICD-10-PCS | Mod: AI,,, | Performed by: PHYSICIAN ASSISTANT

## 2022-11-25 PROCEDURE — 93010 EKG 12-LEAD: ICD-10-PCS | Mod: ,,, | Performed by: INTERNAL MEDICINE

## 2022-11-25 PROCEDURE — 85025 COMPLETE CBC W/AUTO DIFF WBC: CPT | Performed by: EMERGENCY MEDICINE

## 2022-11-25 PROCEDURE — 83735 ASSAY OF MAGNESIUM: CPT | Performed by: EMERGENCY MEDICINE

## 2022-11-25 PROCEDURE — 81001 URINALYSIS AUTO W/SCOPE: CPT | Performed by: EMERGENCY MEDICINE

## 2022-11-25 PROCEDURE — 87040 BLOOD CULTURE FOR BACTERIA: CPT | Mod: 59 | Performed by: EMERGENCY MEDICINE

## 2022-11-25 PROCEDURE — 80053 COMPREHEN METABOLIC PANEL: CPT | Performed by: EMERGENCY MEDICINE

## 2022-11-25 PROCEDURE — 25000003 PHARM REV CODE 250: Performed by: STUDENT IN AN ORGANIZED HEALTH CARE EDUCATION/TRAINING PROGRAM

## 2022-11-25 RX ORDER — SODIUM CHLORIDE 9 MG/ML
INJECTION, SOLUTION INTRAVENOUS CONTINUOUS
Status: DISCONTINUED | OUTPATIENT
Start: 2022-11-25 | End: 2022-11-25

## 2022-11-25 RX ORDER — HEPARIN SODIUM 5000 [USP'U]/ML
5000 INJECTION, SOLUTION INTRAVENOUS; SUBCUTANEOUS EVERY 8 HOURS
Status: DISCONTINUED | OUTPATIENT
Start: 2022-11-25 | End: 2022-11-28 | Stop reason: HOSPADM

## 2022-11-25 RX ORDER — FAMOTIDINE 20 MG/1
20 TABLET, FILM COATED ORAL NIGHTLY
Status: DISCONTINUED | OUTPATIENT
Start: 2022-11-25 | End: 2022-11-28 | Stop reason: HOSPADM

## 2022-11-25 RX ORDER — CARVEDILOL 25 MG/1
25 TABLET ORAL 2 TIMES DAILY
Status: DISCONTINUED | OUTPATIENT
Start: 2022-11-25 | End: 2022-11-28

## 2022-11-25 RX ORDER — TACROLIMUS 1 MG/1
6 CAPSULE ORAL EVERY MORNING
Status: DISCONTINUED | OUTPATIENT
Start: 2022-11-26 | End: 2022-11-26

## 2022-11-25 RX ORDER — TALC
6 POWDER (GRAM) TOPICAL NIGHTLY PRN
Status: DISCONTINUED | OUTPATIENT
Start: 2022-11-25 | End: 2022-11-28 | Stop reason: HOSPADM

## 2022-11-25 RX ORDER — NYSTATIN 100000 [USP'U]/ML
5 SUSPENSION ORAL
Status: DISCONTINUED | OUTPATIENT
Start: 2022-11-25 | End: 2022-11-28

## 2022-11-25 RX ORDER — ONDANSETRON 2 MG/ML
4 INJECTION INTRAMUSCULAR; INTRAVENOUS EVERY 6 HOURS PRN
Status: DISCONTINUED | OUTPATIENT
Start: 2022-11-25 | End: 2022-11-28 | Stop reason: HOSPADM

## 2022-11-25 RX ORDER — NIFEDIPINE 30 MG/1
60 TABLET, EXTENDED RELEASE ORAL 2 TIMES DAILY
Status: DISCONTINUED | OUTPATIENT
Start: 2022-11-25 | End: 2022-11-28

## 2022-11-25 RX ORDER — SODIUM CHLORIDE 0.9 % (FLUSH) 0.9 %
10 SYRINGE (ML) INJECTION
Status: DISCONTINUED | OUTPATIENT
Start: 2022-11-25 | End: 2022-11-28 | Stop reason: HOSPADM

## 2022-11-25 RX ORDER — ACETAMINOPHEN 325 MG/1
650 TABLET ORAL EVERY 8 HOURS PRN
Status: DISCONTINUED | OUTPATIENT
Start: 2022-11-25 | End: 2022-11-28 | Stop reason: HOSPADM

## 2022-11-25 RX ORDER — PREDNISONE 10 MG/1
10 TABLET ORAL ONCE
Status: COMPLETED | OUTPATIENT
Start: 2022-11-26 | End: 2022-11-26

## 2022-11-25 RX ORDER — PREDNISONE 5 MG/1
5 TABLET ORAL DAILY
Status: DISCONTINUED | OUTPATIENT
Start: 2022-11-27 | End: 2022-11-28 | Stop reason: HOSPADM

## 2022-11-25 RX ORDER — SULFAMETHOXAZOLE AND TRIMETHOPRIM 400; 80 MG/1; MG/1
1 TABLET ORAL
Status: DISCONTINUED | OUTPATIENT
Start: 2022-11-28 | End: 2022-11-28 | Stop reason: HOSPADM

## 2022-11-25 RX ORDER — SODIUM BICARBONATE 650 MG/1
1300 TABLET ORAL 3 TIMES DAILY
Status: DISCONTINUED | OUTPATIENT
Start: 2022-11-25 | End: 2022-11-28 | Stop reason: HOSPADM

## 2022-11-25 RX ORDER — ACETAMINOPHEN 500 MG
1000 TABLET ORAL
Status: COMPLETED | OUTPATIENT
Start: 2022-11-25 | End: 2022-11-25

## 2022-11-25 RX ORDER — CEFEPIME HYDROCHLORIDE 1 G/50ML
1 INJECTION, SOLUTION INTRAVENOUS
Status: DISCONTINUED | OUTPATIENT
Start: 2022-11-25 | End: 2022-11-28 | Stop reason: HOSPADM

## 2022-11-25 RX ORDER — ONDANSETRON 8 MG/1
8 TABLET, ORALLY DISINTEGRATING ORAL EVERY 8 HOURS PRN
Status: DISCONTINUED | OUTPATIENT
Start: 2022-11-25 | End: 2022-11-25

## 2022-11-25 RX ORDER — VALGANCICLOVIR 450 MG/1
450 TABLET, FILM COATED ORAL
Status: DISCONTINUED | OUTPATIENT
Start: 2022-11-28 | End: 2022-11-28 | Stop reason: HOSPADM

## 2022-11-25 RX ORDER — TACROLIMUS 1 MG/1
5 CAPSULE ORAL EVERY EVENING
Status: DISCONTINUED | OUTPATIENT
Start: 2022-11-25 | End: 2022-11-26

## 2022-11-25 RX ORDER — ONDANSETRON 4 MG/1
4 TABLET, FILM COATED ORAL EVERY 6 HOURS PRN
Status: DISCONTINUED | OUTPATIENT
Start: 2022-11-25 | End: 2022-11-28 | Stop reason: HOSPADM

## 2022-11-25 RX ORDER — VENLAFAXINE HYDROCHLORIDE 37.5 MG/1
37.5 CAPSULE, EXTENDED RELEASE ORAL DAILY
Status: DISCONTINUED | OUTPATIENT
Start: 2022-11-26 | End: 2022-11-28 | Stop reason: HOSPADM

## 2022-11-25 RX ADMIN — SODIUM CHLORIDE: 0.9 INJECTION, SOLUTION INTRAVENOUS at 02:11

## 2022-11-25 RX ADMIN — SODIUM CHLORIDE 1000 ML: 0.9 INJECTION, SOLUTION INTRAVENOUS at 02:11

## 2022-11-25 RX ADMIN — SODIUM BICARBONATE: 84 INJECTION, SOLUTION INTRAVENOUS at 11:11

## 2022-11-25 RX ADMIN — SODIUM CHLORIDE, SODIUM LACTATE, POTASSIUM CHLORIDE, AND CALCIUM CHLORIDE 1000 ML: .6; .31; .03; .02 INJECTION, SOLUTION INTRAVENOUS at 11:11

## 2022-11-25 RX ADMIN — FAMOTIDINE 20 MG: 20 TABLET ORAL at 08:11

## 2022-11-25 RX ADMIN — VANCOMYCIN HYDROCHLORIDE 1250 MG: 1.25 INJECTION, POWDER, LYOPHILIZED, FOR SOLUTION INTRAVENOUS at 11:11

## 2022-11-25 RX ADMIN — HEPARIN SODIUM 5000 UNITS: 5000 INJECTION INTRAVENOUS; SUBCUTANEOUS at 08:11

## 2022-11-25 RX ADMIN — TACROLIMUS 5 MG: 1 CAPSULE ORAL at 05:11

## 2022-11-25 RX ADMIN — SODIUM BICARBONATE 650 MG TABLET 1300 MG: at 02:11

## 2022-11-25 RX ADMIN — SODIUM BICARBONATE 650 MG TABLET 1300 MG: at 08:11

## 2022-11-25 RX ADMIN — ACETAMINOPHEN 1000 MG: 500 TABLET ORAL at 11:11

## 2022-11-25 RX ADMIN — CEFEPIME HYDROCHLORIDE 1 G: 1 INJECTION, SOLUTION INTRAVENOUS at 01:11

## 2022-11-25 RX ADMIN — NYSTATIN 500000 UNITS: 500000 SUSPENSION ORAL at 08:11

## 2022-11-25 RX ADMIN — NIFEDIPINE 30 MG: 30 TABLET, FILM COATED, EXTENDED RELEASE ORAL at 08:11

## 2022-11-25 NOTE — TELEPHONE ENCOUNTER
Patient c/o loss of appetite, nausea and vomiting since Monday night. States Zofran has not helped prevent her from throwing up. Reports feeling weak. Advised patient to go to ED to be assessed. Dr. Cook aware of plan. Patient verbalizes understanding.

## 2022-11-25 NOTE — ASSESSMENT & PLAN NOTE
- Cr 5.4 on outpt labs 11/25, up from 0.9  - Suspect d/t dehydration from lack of PO intake plus UTI  - STAT kidney US  - IVF and IV abx  - DSA sent  - Strict I/O. Daily weights. Avoid nephrotoxic meds.  - Renal diet until kidney fx improves.

## 2022-11-25 NOTE — SUBJECTIVE & OBJECTIVE
Subjective:   History of Present Illness:  Ms. Read is a 27 y.o.  female with ESRD secondary to diabetic nephropathy and HTN, now s/p SPK 11/3/22 (Thymo induction, CMV D-/R+). Her post-op course was complicated by urinary retention requiring Moncada replacement, now resolved. Moncada removed 11/8, voiding without difficulty. PVR 50-100ml, patient able to double void. Panc enzymes trended down but Amylase slightly elevated 11/7. Fasting BG normal. Panc US 11/7 was satisfactory. Drain output was 400-700ml/day. Sent for amylase and resulted 204. Drain dc'd 11/8. Patient was tolerating her diet well and was discharged on POD#7 with Cr of 1.0.   She now presents to the ED this morning 11/25 with fever, generalized malaise, n/v, and decreased PO intake X 5 days. Patient with severe GEORGE on outpatient labs done earlier today, Cr up to 5.4. Patient began experiencing dysuria several days ago and UA was done 11/23 which showed +3 leuks, >100 WBC. Ciprofloxacin was prescribed for UTI prophylaxis 11/23 while awaiting UA results. Urine cx from that time with no growth. Panc enzymes from outpt labs WNL. Discussed plan with Dr. Pettit and Dr. Cook. Concern for worsening UTI vs pyelonephritis. Will obtain STAT Kidney and pancreas US, repeat labs, infectious work-up including UA, blood cx, COVID, CMV, BK. IV Cefepime/Vanc and IVF.     Ms. Read is a 27 y.o. year old female who is status post Kidney, Pancreas Transplant - 11/3/2022  (#1).  Her maintenance immunosuppression consists of:   Immunosuppressants (From admission, onward)      Start     Stop Route Frequency Ordered    11/26/22 0800  tacrolimus capsule 6 mg         -- Oral Every morning 11/25/22 1059    11/25/22 1800  tacrolimus capsule 5 mg         -- Oral Every evening 11/25/22 1059              Past Medical, Surgical, Family, and Social History:   Unchanged from H&P.    Scheduled Meds:   carvediloL  25 mg Oral BID    ceFEPime (MAXIPIME) IVPB  1 g  Intravenous Q12H    famotidine  20 mg Oral QHS    heparin (porcine)  5,000 Units Subcutaneous Q8H    NIFEdipine  60 mg Oral BID    nystatin  5 mL Oral QID (PC + HS)    [START ON 11/26/2022] predniSONE  10 mg Oral Once    [START ON 11/27/2022] predniSONE  5 mg Oral Daily    sodium bicarbonate  1,300 mg Oral TID    sodium chloride 0.9%  1,000 mL Intravenous Once    [START ON 11/28/2022] sulfamethoxazole-trimethoprim 400-80mg  1 tablet Oral Every Mon, Wed, Fri    tacrolimus  5 mg Oral Daily PM    [START ON 11/26/2022] tacrolimus  6 mg Oral Daily AM    [START ON 11/28/2022] valGANciclovir  450 mg Oral Every Mon, Wed, Fri    [START ON 11/26/2022] venlafaxine  37.5 mg Oral Daily     Continuous Infusions:   sodium chloride 0.9% 100 mL/hr at 11/25/22 1448     PRN Meds:acetaminophen, melatonin, ondansetron, ondansetron, sodium chloride 0.9%, Pharmacy to dose Vancomycin consult **AND** vancomycin - pharmacy to dose    Intake/Output - Last 3 Shifts         11/23 0700 11/24 0659 11/24 0700 11/25 0659 11/25 0700 11/26 0659           Stool Occurrence   0 x             Review of Systems   Constitutional:  Positive for chills, fatigue and fever.   HENT:  Negative for facial swelling and trouble swallowing.    Eyes:  Negative for photophobia and redness.   Respiratory:  Negative for cough, shortness of breath, wheezing and stridor.    Cardiovascular:  Negative for chest pain, palpitations and leg swelling.   Gastrointestinal:  Positive for nausea and vomiting. Negative for abdominal distention, abdominal pain, blood in stool, constipation and diarrhea.   Genitourinary:  Negative for decreased urine volume, difficulty urinating and dysuria.   Musculoskeletal:  Negative for neck pain and neck stiffness.   Allergic/Immunologic: Positive for immunocompromised state.   Neurological:  Positive for weakness. Negative for dizziness and light-headedness.   Psychiatric/Behavioral:  Negative for agitation, behavioral problems, confusion and  "decreased concentration.     Objective:     Vital Signs (Most Recent):  Temp: 98.2 °F (36.8 °C) (11/25/22 1455)  Pulse: 109 (11/25/22 1455)  Resp: 18 (11/25/22 1455)  BP: 132/70 (11/25/22 1455)  SpO2: 98 % (11/25/22 1455) Vital Signs (24h Range):  Temp:  [98.2 °F (36.8 °C)-101.7 °F (38.7 °C)] 98.2 °F (36.8 °C)  Pulse:  [109-131] 109  Resp:  [18-20] 18  SpO2:  [98 %-100 %] 98 %  BP: (132-153)/(70-83) 132/70     Weight: 61.2 kg (135 lb)  Height: 5' 4" (162.6 cm)  Body mass index is 23.17 kg/m².    Physical Exam  Vitals and nursing note reviewed.   Constitutional:       General: She is not in acute distress.  HENT:      Head: Normocephalic and atraumatic.      Mouth/Throat:      Mouth: Mucous membranes are dry.   Eyes:      General: No scleral icterus.        Right eye: No discharge.         Left eye: No discharge.   Cardiovascular:      Rate and Rhythm: Regular rhythm. Tachycardia present.      Heart sounds: No murmur heard.  Pulmonary:      Effort: Pulmonary effort is normal. No respiratory distress.      Breath sounds: No wheezing or rales.   Abdominal:      General: Bowel sounds are normal. There is no distension.      Tenderness: There is no abdominal tenderness. There is no guarding.      Comments: Midline incision ALY with staples (scheduled for outpt staple and stent removal 11/30)   Musculoskeletal:      Right lower leg: No edema.      Left lower leg: No edema.   Skin:     General: Skin is warm and dry.      Coloration: Skin is not jaundiced.   Neurological:      General: No focal deficit present.      Mental Status: She is alert and oriented to person, place, and time.   Psychiatric:         Mood and Affect: Mood normal.         Behavior: Behavior normal.         Thought Content: Thought content normal.       Laboratory:  Labs within the past 24 hours have been reviewed.    Diagnostic Results:  CXR reviewed, kidney and panc US pending  "

## 2022-11-25 NOTE — H&P
[facility] - Transplant Stepdown  Kidney Transplant  H&P      Subjective:   History of Present Illness:  Ms. Read is a 27 y.o.  female with ESRD secondary to diabetic nephropathy and HTN, now s/p SPK 11/3/22 (Thymo induction, CMV D-/R+). Her post-op course was complicated by urinary retention requiring Moncada replacement, now resolved. Moncada removed 11/8, voiding without difficulty. PVR 50-100ml, patient able to double void. Panc enzymes trended down but Amylase slightly elevated 11/7. Fasting BG normal. Panc US 11/7 was satisfactory. Drain output was 400-700ml/day. Sent for amylase and resulted 204. Drain dc'd 11/8. Patient was tolerating her diet well and was discharged on POD#7 with Cr of 1.0.   She now presents to the ED this morning 11/25 with fever, generalized malaise, n/v, and decreased PO intake X 5 days. Patient with severe GEORGE on outpatient labs done earlier today, Cr up to 5.4. Patient began experiencing dysuria several days ago and UA was done 11/23 which showed +3 leuks, >100 WBC. Ciprofloxacin was prescribed for UTI prophylaxis 11/23 while awaiting UA results. Urine cx from that time with no growth. Panc enzymes from outpt labs WNL. Discussed plan with Dr. Pettit and Dr. Cook. Concern for worsening UTI vs pyelonephritis. Will obtain STAT Kidney and pancreas US, repeat labs, infectious work-up including UA, blood cx, COVID, CMV, BK. IV Cefepime/Vanc and IVF.     Ms. Read is a 27 y.o. year old female who is status post Kidney, Pancreas Transplant - 11/3/2022  (#1).  Her maintenance immunosuppression consists of:   Immunosuppressants (From admission, onward)      Start     Stop Route Frequency Ordered    11/26/22 0800  tacrolimus capsule 6 mg         -- Oral Every morning 11/25/22 1059    11/25/22 1800  tacrolimus capsule 5 mg         -- Oral Every evening 11/25/22 1059              Past Medical, Surgical, Family, and Social History:   Unchanged from H&P.    Scheduled Meds:    carvediloL  25 mg Oral BID    ceFEPime (MAXIPIME) IVPB  1 g Intravenous Q12H    famotidine  20 mg Oral QHS    heparin (porcine)  5,000 Units Subcutaneous Q8H    NIFEdipine  60 mg Oral BID    nystatin  5 mL Oral QID (PC + HS)    [START ON 11/26/2022] predniSONE  10 mg Oral Once    [START ON 11/27/2022] predniSONE  5 mg Oral Daily    sodium bicarbonate  1,300 mg Oral TID    sodium chloride 0.9%  1,000 mL Intravenous Once    [START ON 11/28/2022] sulfamethoxazole-trimethoprim 400-80mg  1 tablet Oral Every Mon, Wed, Fri    tacrolimus  5 mg Oral Daily PM    [START ON 11/26/2022] tacrolimus  6 mg Oral Daily AM    [START ON 11/28/2022] valGANciclovir  450 mg Oral Every Mon, Wed, Fri    [START ON 11/26/2022] venlafaxine  37.5 mg Oral Daily     Continuous Infusions:   sodium chloride 0.9% 100 mL/hr at 11/25/22 1448     PRN Meds:acetaminophen, melatonin, ondansetron, ondansetron, sodium chloride 0.9%, Pharmacy to dose Vancomycin consult **AND** vancomycin - pharmacy to dose    Intake/Output - Last 3 Shifts         11/23 0700 11/24 0659 11/24 0700 11/25 0659 11/25 0700 11/26 0659           Stool Occurrence   0 x             Review of Systems   Constitutional:  Positive for chills, fatigue and fever.   HENT:  Negative for facial swelling and trouble swallowing.    Eyes:  Negative for photophobia and redness.   Respiratory:  Negative for cough, shortness of breath, wheezing and stridor.    Cardiovascular:  Negative for chest pain, palpitations and leg swelling.   Gastrointestinal:  Positive for nausea and vomiting. Negative for abdominal distention, abdominal pain, blood in stool, constipation and diarrhea.   Genitourinary:  Negative for decreased urine volume, difficulty urinating and dysuria.   Musculoskeletal:  Negative for neck pain and neck stiffness.   Allergic/Immunologic: Positive for immunocompromised state.   Neurological:  Positive for weakness. Negative for dizziness and light-headedness.  "  Psychiatric/Behavioral:  Negative for agitation, behavioral problems, confusion and decreased concentration.     Objective:     Vital Signs (Most Recent):  Temp: 98.2 °F (36.8 °C) (11/25/22 1455)  Pulse: 109 (11/25/22 1455)  Resp: 18 (11/25/22 1455)  BP: 132/70 (11/25/22 1455)  SpO2: 98 % (11/25/22 1455) Vital Signs (24h Range):  Temp:  [98.2 °F (36.8 °C)-101.7 °F (38.7 °C)] 98.2 °F (36.8 °C)  Pulse:  [109-131] 109  Resp:  [18-20] 18  SpO2:  [98 %-100 %] 98 %  BP: (132-153)/(70-83) 132/70     Weight: 61.2 kg (135 lb)  Height: 5' 4" (162.6 cm)  Body mass index is 23.17 kg/m².    Physical Exam  Vitals and nursing note reviewed.   Constitutional:       General: She is not in acute distress.  HENT:      Head: Normocephalic and atraumatic.      Mouth/Throat:      Mouth: Mucous membranes are dry.   Eyes:      General: No scleral icterus.        Right eye: No discharge.         Left eye: No discharge.   Cardiovascular:      Rate and Rhythm: Regular rhythm. Tachycardia present.      Heart sounds: No murmur heard.  Pulmonary:      Effort: Pulmonary effort is normal. No respiratory distress.      Breath sounds: No wheezing or rales.   Abdominal:      General: Bowel sounds are normal. There is no distension.      Tenderness: There is no abdominal tenderness. There is no guarding.      Comments: Midline incision ALY with staples (scheduled for outpt staple and stent removal 11/30)   Musculoskeletal:      Right lower leg: No edema.      Left lower leg: No edema.   Skin:     General: Skin is warm and dry.      Coloration: Skin is not jaundiced.   Neurological:      General: No focal deficit present.      Mental Status: She is alert and oriented to person, place, and time.   Psychiatric:         Mood and Affect: Mood normal.         Behavior: Behavior normal.         Thought Content: Thought content normal.       Laboratory:  Labs within the past 24 hours have been reviewed.    Diagnostic Results:  CXR reviewed, kidney and panc " "US pending    Assessment/Plan:     * Sepsis  - Admit with fever, tachycardia, dirty UA, n/v 11/25  - Cont IVF and BS abx  - Infectious workup in process  - Kidney and Panc US pending  - Monitor    Acute cystitis without hematuria  - Patient with dysuria earlier this week, UA from 11/23 with >100 WBC, +3 leuks. However cx with no growth  - Pt prescribed Cipro outpt 11/23  - Presents to ED 11/25 with fevers/chills, concern for worsening UTI vs pyelo  - STAT Kidney US  - IV cefepime/vanc  - Infectious work-up  - IVF hydration      Anemia of chronic disease  - H/H stable. Monitor with daily labs.      Long-term use of immunosuppressant medication  - see prophylactic immunotherapy      Prophylactic immunotherapy  - Continue prograf and steroid taper  - Check prograf level daily and adjust for therapeutic dosage. Monitor for toxic side effects  - Cellcept held on admit d/t active infection      At risk for opportunistic infections  - Continue OI prophylaxis per transplant protocol      Status post simultaneous kidney and pancreas transplant  - S/p SPK 11/3/22  - Progressed well post-op, discharged POD#7  - Now admitted with GEORGE, fevers, UTI  - See "GEORGE" and "acute cystitis"  - Panc enzymes WNL on outpt labs      Major depressive disorder, single episode, unspecified  - Continue home meds.      Hypertension, essential  - cont home BP meds with hold parameters      GEORGE (acute kidney injury)  - Cr 5.4 on outpt labs 11/25, up from 0.9  - Suspect d/t dehydration from lack of PO intake plus UTI  - STAT kidney US  - IVF and IV abx  - DSA sent  - Strict I/O. Daily weights. Avoid nephrotoxic meds.  - Renal diet until kidney fx improves.    Anemia due to chronic kidney disease  - daily CBC      Metabolic acidosis  - Continue oral bicarb.      Hyperkalemia  - K 5.5 in ED  - Cont IVF and oral bicarb  - Renal diet  - Recheck BMP @ 1700      Renovascular hypertension  - Continue home meds with hold parameters.             Niurka EDWARDS" BOOKER Alvarez  Kidney Transplant  [facility] - [facility]

## 2022-11-25 NOTE — ASSESSMENT & PLAN NOTE
- Admit with fever, tachycardia, dirty UA, n/v 11/25  - Cont IVF and BS abx  - Infectious workup in process  - Kidney and Panc US pending  - Monitor

## 2022-11-25 NOTE — PROGRESS NOTES
Pharmacokinetic Initial Assessment: IV Vancomycin    Assessment/Plan:    Initiate intravenous vancomycin with loading dose of 1250 mg once with subsequent doses when random concentrations are less than 20 mcg/mL. Pulse dose in setting of elevated Scr in post transplant patient.  Desired empiric serum trough concentration is 15 to 20 mcg/mL  Draw vancomycin random level on 11/26 at 1100.  Pharmacy will continue to follow and monitor vancomycin.      Please contact pharmacy at extension [phone] with any questions regarding this assessment.     Thank you for the consult,   Ancelmo Beatty       Patient brief summary:  Isabela Read is a 27 y.o. female initiated on antimicrobial therapy with IV Vancomycin for treatment of suspected bacteremia    Drug Allergies:   Review of patient's allergies indicates:  No Known Allergies    Actual Body Weight:   61.2 kg    Renal Function:   Estimated Creatinine Clearance: 13.5 mL/min (A) (based on SCr of 5.4 mg/dL (H)).,     CBC (last 72 hours):  Recent Labs   Lab Result Units 11/25/22  0838 11/25/22  1051   WBC K/uL 11.01 10.84   Hemoglobin g/dL 9.8* 9.3*   Hematocrit % 29.2* 27.9*   Platelets K/uL 309 336   Gran % % 92.2* 91.2*   Lymph % % 2.5* 3.0*   Mono % % 3.1* 3.3*   Eosinophil % % 1.8 1.7   Basophil % % 0.1 0.2   Differential Method  Automated Automated       Metabolic Panel (last 72 hours):  Recent Labs   Lab Result Units 11/23/22  1327 11/25/22  0838   Sodium mmol/L  --  134*   Potassium mmol/L  --  4.4   Chloride mmol/L  --  102   CO2 mmol/L  --  20*   Glucose mg/dL  --  106   Glucose, UA  Negative  --    BUN mg/dL  --  42*   Creatinine mg/dL  --  5.4*   Albumin g/dL  --  3.6   Magnesium mg/dL  --  1.7   Phosphorus mg/dL  --  5.0*       Drug levels (last 3 results):  No results for input(s): VANCOMYCINRA, VANCORANDOM, VANCOMYCINPE, VANCOPEAK, VANCOMYCINTR, VANCOTROUGH in the last 72 hours.    Microbiologic Results:  Microbiology Results (last 7 days)       Procedure  Component Value Units Date/Time    Blood culture x two cultures. Draw prior to antibiotics. [[id number]] Collected: 11/25/22 1052    Order Status: Sent Specimen: Blood from Peripheral, Hand, Right Updated: 11/25/22 1114    Blood culture x two cultures. Draw prior to antibiotics. [[id number]] Collected: 11/25/22 1052    Order Status: Sent Specimen: Blood from Peripheral, Wrist, Right Updated: 11/25/22 1114

## 2022-11-25 NOTE — ASSESSMENT & PLAN NOTE
- Continue prograf and steroid taper  - Check prograf level daily and adjust for therapeutic dosage. Monitor for toxic side effects  - Cellcept held on admit d/t active infection

## 2022-11-25 NOTE — PROGRESS NOTES
Admit Note     Met with patient to assess needs. Patient is a 27 y.o. single female, admitted for:    Fever [R50.9]  Sepsis [A41.9]      Patient admitted from emergency department on 11/25/2022 .  At this time, patient presents as alert and oriented x 4, pleasant, good eye contact, well groomed, recall good, concentration/judgement good, average intelligence, calm, communicative, cooperative, and asking and answering questions appropriately.  At this time, patients caregiver presents as  not present at this time .    Household/Family Systems     Patient resides with patient's mother and brother, at [address] [place] [address].  Support system includes her brother Mustapha, her mother Kiersten Garica, and her uncle Florentin.  Patient does not have dependents that are need of being cared for.     Patients primary caregiver is Kiersten Garcia, patients mother, phone number [phone].  Confirmed patients contact information is [phone] (home);   Telephone Information:   Mobile [phone]   .    During admission, patient's caregiver plans to stay in patient's room.  Confirmed patient and patients caregivers do have access to reliable transportation.    Cognitive Status/Learning     Patient reports reading ability as 12th grade and states patient does not have difficulty with reading, writing, seeing, hearing, comprehension, learning, and memory.  Patient reports patient learns best by visual learning.   Needed: No.   Highest education level: High School (9-12) or GED    Vocation/Disability   .  Working for Income: No  If no, reason not working: Disability  Patient is disabled due to ESRD since 2020.  Prior to disability, patient  was employed at [employer].    Adherence     Patient reports a high level of adherence to patients health care regimen.  Adherence counseling and education provided. Patient verbalizes understanding.    Substance Use    Patient reports the  following substance usage.    Tobacco: none, patient denies any use.  Alcohol: none, patient denies any use.  Illicit Drugs/Non-prescribed Medications: none, patient denies any use.  Patient states clear understanding of the potential impact of substance use.  Substance abstinence/cessation counseling, education and resources provided and reviewed.     Services Utilizing/ADLS    Infusion Service: Prior to admission, patient utilizing? no  Home Health: Prior to admission, patient utilizing? no  DME: Prior to admission, yes bpc  Pulmonary/Cardiac Rehab: Prior to admission, no  Dialysis:  Prior to admission, no  Transplant Specialty Pharmacy:  Prior to admission, yes; [facility].    Prior to admission, patient reports patient was independent with ADLS and was driving.  Patient reports patient is not able to care for self at this time due to compromised medical condition (as documented in medical record) and physical weakness..  Patient indicates a willingness to care for self once medically cleared to do so.    Insurance/Medications    Insured by   Payer/Plan Subscr  Sex Relation Sub. Ins. ID Effective Group Num   1. [place] - [place]* SIXTO CARNEY* 1995 Female Self [id number] 22                                    [address]   2. [place] CAR[place]* 1995 Female Self [id number]                                   [address] [address]      Primary Insurance (for UNOS reporting): Public Insurance - Medicare FFS (Fee For Service)  Secondary Insurance (for UNOS reporting): Public Insurance - Medicaid    Patient reports patient is able to obtain and afford medications at this time and at time of discharge.    Living Will/Healthcare Power of     Patient states patient does not have a LW and/or HCPA.   provided education regarding LW and HCPA and the completion of forms.    Coping/Mental Health    Patient is coping well with the aid of  family members and  friends.  Patient denies mental health difficulties. Patient denied any mental health concerns at this time including anxiety and depression.  educated patient on resources available both inpatient and outpatient. Patient agrees to contact transplant team with needs, questions, or concerns as they arise.     Discharge Planning    At time of discharge, patient plans to return to patient's home under the care of Kiersten Garcia.  Patients mother will transport patient.  Per rounds today, expected discharge date has not been medically determined at this time. Patient and patients caregiver  verbalize understanding and are involved in treatment planning and discharge process.    Additional Concerns     providing ongoing psychosocial support, education, resources and d/c planning as needed.  SW remains available.  remains available. Patient denies additional needs and/or concerns at this time. Patient verbalizes understanding and agreement with information reviewed, social work availability, and how to access available resources as needed.

## 2022-11-25 NOTE — ASSESSMENT & PLAN NOTE
"- S/p SPK 11/3/22  - Progressed well post-op, discharged POD#7  - Now admitted with GEORGE, fevers, UTI  - See "GEORGE" and "acute cystitis"  - Panc enzymes WNL on outpt labs    "

## 2022-11-25 NOTE — ASSESSMENT & PLAN NOTE
- Patient with dysuria earlier this week, UA from 11/23 with >100 WBC, +3 leuks. However cx with no growth  - Pt prescribed Cipro outpt 11/23  - Presents to ED 11/25 with fevers/chills, concern for worsening UTI vs pyelo  - STAT Kidney US  - IV cefepime/vanc  - Infectious work-up  - IVF hydration

## 2022-11-25 NOTE — ED PROVIDER NOTES
Encounter Date: 11/25/2022       History     Chief Complaint   Patient presents with    multiple complaints     Kidney tx 3 weeks ago; +generalized weakness, n/v, decreased po intake x 5 days; fever in triage     Ms. Read is a 27 y.o. year old  female with ESRD secondary to diabetic nephropathy ( s/p Kidney transplant 11/3/22) and HTN who presents to the emergency department due to fevers, chills, nausea and vomiting. Patient states that since Monday she has had these symptoms she also has decreased appetite. She states that she has been trying to take liquids recently and she has been vomiting them out. She  has felt very unwell she was concerned that it was an issus with the transplant so she wanted to come to the emergency department for evaluation.       Review of patient's allergies indicates:  No Known Allergies  Past Medical History:   Diagnosis Date    Acute kidney injury superimposed on chronic kidney disease 4/7/2021    Anemia     Chronic hypertension with exacerbation during pregnancy in second trimester 11/6/2020    Current regimen (11/6/20):  - Carvedilol 12.5 mg BID - Nifedipine 30 mg daily Baseline CKD + proteinuria    Chronic kidney disease     Depression     Diabetes mellitus     Diabetic retinopathy     Diarrhea     Encounter for blood transfusion     End stage renal failure on dialysis     Gastroparesis     Glaucoma     Hepatomegaly 4/29/2021    Hx of psychiatric care     Hyperlipidemia     Hypertension     Nephrotic syndrome     Nephrotic syndrome 3/4/2021    Palpitations     Poor fetal growth affecting management of mother in second trimester 10/15/2020    Restrictive lung disease     Retinal detachment     Severe pre-eclampsia in second trimester 11/6/2020    Type 1 diabetes mellitus with end-stage renal disease (ESRD) 2/24/2015    Followed by Dr. Mayo.  Last A1C was 13  Currently on lantus 20 units qAM and humolog 10 units with meals  - a fetal echocardiogram around 22-24  weeks - needs eye exam, podiatry exam  - needs EKG, maternal echo and fu with cardiology  - ASA 81mg, folic acid 4mg PO daily - hemoglobin A1c every 4-6 weeks -24 hour urine for protein and creatinine clearance should be performed. Patient will also need a     Past Surgical History:   Procedure Laterality Date    AV FISTULA PLACEMENT Left 4/7/2021    Procedure: CREATION, AV FISTULA;  Surgeon: Roberto Ryan MD;  Location: [facility] OR 2ND FLR;  Service: Peripheral Vascular;  Laterality: Left;    COLONOSCOPY N/A 3/16/2022    Procedure: COLONOSCOPY;  Surgeon: Tavo Kwok MD;  Location: [facility] ENDO (4TH FLR);  Service: Endoscopy;  Laterality: N/A;  Questionable history of delayed gastric emptying longstanding diabetes now on eating pre transplant workup for history of nausea vomiting which seems to have improved with dialysis also chronic diarrhea and history of anemia pre transplant workup for kidney transplant. 3    ESOPHAGOGASTRODUODENOSCOPY N/A 3/16/2022    Procedure: EGD (ESOPHAGOGASTRODUODENOSCOPY);  Surgeon: Tavo Kwok MD;  Location: [facility] ENDO (4TH FLR);  Service: Endoscopy;  Laterality: N/A;  Questionable history of delayed gastric emptying longstanding diabetes now on eating pre transplant workup for history of nausea vomiting which seems to have improved with dialysis also chronic diarrhea and history of anemia pre transplant workup for    FISTULOGRAM N/A 8/11/2021    Procedure: Fistulogram;  Surgeon: Roberto Ryan MD;  Location: [facility] CATH LAB;  Service: Cardiology;  Laterality: N/A;    KIDNEY TRANSPLANT Right 11/2/2022    Procedure: TRANSPLANT, KIDNEY;  Surgeon: Kumar Rodriges Jr., MD;  Location: [facility] OR 2ND FLR;  Service: Transplant;  Laterality: Right;    LASER PHOTOCOAGULATION OF RETINA Right 5/31/2022    Procedure: PHOTOCOAGULATION, RETINA, USING LASER;  Surgeon: Maximilian Montaño MD;  Location: [facility] OR 1ST FLR;  Service: Ophthalmology;  Laterality: Right;    PERCUTANEOUS  TRANSLUMINAL ANGIOPLASTY OF ARTERIOVENOUS FISTULA N/A 8/11/2021    Procedure: PTA, AV FISTULA;  Surgeon: Roberto Ryan MD;  Location: [facility] CATH LAB;  Service: Cardiology;  Laterality: N/A;    REMOVAL IMPLANT, POSTERIOR SEGMENT, INTRAOCULAR Right 2/1/2022    Procedure: REMOVAL IMPLANT, POSTERIOR SEGMENT, INTRAOCULAR;  Surgeon: Maximilian Montaño MD;  Location: [facility] OR 1ST FLR;  Service: Ophthalmology;  Laterality: Right;    REPAIR OF RETINAL DETACHMENT WITH VITRECTOMY Right 1/25/2022    Procedure: REPAIR, RETINAL DETACHMENT, WITH VITRECTOMY, MEMBRANE PEEL, LASER, INJECTION OF GAS VS OIL;  Surgeon: Maximilian Montaño MD;  Location: [facility] OR 1ST FLR;  Service: Ophthalmology;  Laterality: Right;    REVISION OF ARTERIOVENOUS FISTULA Left 12/10/2021    Procedure: REVISION, AV FISTULA with BVT;  Surgeon: Roberto Ryan MD;  Location: [facility] OR 2ND FLR;  Service: Peripheral Vascular;  Laterality: Left;    TRANSPLANTATION OF PANCREAS N/A 11/2/2022    Procedure: TRANSPLANT, PANCREAS;  Surgeon: Kumar Rodriges Jr., MD;  Location: [facility] OR 2ND FLR;  Service: Transplant;  Laterality: N/A;    VITRECTOMY BY PARS PLANA APPROACH Right 2/1/2022    Procedure: VITRECTOMY, PARS PLANA APPROACH;  Surgeon: Maximilian Montaño MD;  Location: [facility] OR 1ST FLR;  Service: Ophthalmology;  Laterality: Right;    VITRECTOMY BY PARS PLANA APPROACH Right 5/31/2022    Procedure: VITRECTOMY, PARS PLANA APPROACH;  Surgeon: Maximilian Montaño MD;  Location: [facility] OR 1ST FLR;  Service: Ophthalmology;  Laterality: Right;     Family History   Problem Relation Age of Onset    Hypertension Mother     Heart disease Father     Diabetes Brother     Celiac disease Neg Hx     Cirrhosis Neg Hx     Colon cancer Neg Hx     Colon polyps Neg Hx     Crohn's disease Neg Hx     Inflammatory bowel disease Neg Hx     Liver cancer Neg Hx     Liver disease Neg Hx     Rectal cancer Neg Hx     Stomach cancer Neg Hx     Ulcerative colitis Neg Hx     Esophageal  cancer Neg Hx     Hemochromatosis Neg Hx     Pancreatic cancer Neg Hx     Kidney cancer Neg Hx     Bladder Cancer Neg Hx     Uterine cancer Neg Hx     Ovarian cancer Neg Hx      Social History     Tobacco Use    Smoking status: Never    Smokeless tobacco: Never   Substance Use Topics    Alcohol use: No    Drug use: No     Review of Systems   Constitutional:  Positive for appetite change, fatigue and fever. Negative for chills and diaphoresis.   HENT:  Negative for congestion, rhinorrhea and sore throat.    Eyes:  Negative for visual disturbance.   Respiratory:  Negative for cough, chest tightness and shortness of breath.    Cardiovascular:  Negative for chest pain.   Gastrointestinal:  Positive for abdominal pain, nausea and vomiting. Negative for blood in stool, constipation and diarrhea.   Genitourinary:  Negative for dysuria, hematuria and urgency.   Musculoskeletal:  Negative for back pain.   Skin:  Negative for rash.   Neurological:  Negative for seizures and syncope.   Hematological:  Does not bruise/bleed easily.   Psychiatric/Behavioral:  Negative for agitation and hallucinations.      Physical Exam     Initial Vitals [11/25/22 0948]   BP Pulse Resp Temp SpO2   (!) 153/83 (!) 131 20 (!) 101.7 °F (38.7 °C) 100 %      MAP       --         Physical Exam     Nursing note and vitals reviewed.  Constitutional: Patient appears well-developed and well-nourished. No distress. AxOx3, NAD, well nourished, appears stated age  HENT:   Head: Normocephalic and atraumatic.   Eyes: Conjunctivae and EOM are normal. Pupils are equal, round, and reactive to light. no scleral icterus, no periorbital edema or ecchymosis  Neck: Neck supple. no stridor, no masses, no drooling or voice changes  Normal range of motion.  Cardiovascular: Normal rate, regular rhythm, normal heart sounds and intact distal pulses. no m/r/g  Pulmonary/Chest: Breath sounds normal. CTAB, no wheezes, rales or rhonchi, no increased work of  breathing  Abdominal: Abdomen is soft. Patient exhibits no distension. There is no abdominal tenderness. no organomegaly, no CVAT, presence of well-appearing surgical scar  Musculoskeletal:      Cervical back: Normal range of motion and neck supple.   Neurological: Patient is alert and oriented to person, place, and time. No cranial nerve deficit. Gait normal. GCS score is 15. Moving all extremities, gait intact, face grossly symmetric  Skin: Skin is warm and dry.  Ext: no edema, no lesions, rashes, or deformity  Psych: Normal mood/affect,cooperative, well groomed, makes good eye contact      ED Course   Procedures  Labs Reviewed   CULTURE, BLOOD   CULTURE, BLOOD   CBC W/ AUTO DIFFERENTIAL   COMPREHENSIVE METABOLIC PANEL   LACTIC ACID, PLASMA   LACTIC ACID, PLASMA   URINALYSIS, REFLEX TO URINE CULTURE   MAGNESIUM   PHOSPHORUS   SARS-COV2 (COVID) WITH FLU/RSV BY PCR   TACROLIMUS LEVEL   LIPASE   CMV DNA, QUANTITATIVE, PCR   BK VIRUS, DNA, QUANTITATIVE   POCT URINE PREGNANCY   TYPE & SCREEN          Imaging Results    None          Medications   acetaminophen tablet 1,000 mg (has no administration in time range)   vancomycin 1.25 g in dextrose 5% 250 mL IVPB (ready to mix) (has no administration in time range)   lactated ringers bolus 1,000 mL (has no administration in time range)   sodium chloride 0.9% flush 10 mL (has no administration in time range)   ondansetron disintegrating tablet 8 mg (has no administration in time range)   melatonin tablet 6 mg (has no administration in time range)   acetaminophen tablet 650 mg (has no administration in time range)   heparin (porcine) injection 5,000 Units (has no administration in time range)   cefepime in dextrose 5 % 1 gram/50 mL IVPB 1 g (has no administration in time range)   vancomycin - pharmacy to dose (has no administration in time range)   carvediloL tablet 25 mg (has no administration in time range)   famotidine tablet 20 mg (has no administration in time range)    NIFEdipine 24 hr tablet 60 mg (has no administration in time range)   nystatin 100,000 unit/mL suspension 500,000 Units (has no administration in time range)   predniSONE tablet 10 mg (has no administration in time range)   predniSONE tablet 5 mg (has no administration in time range)   tacrolimus capsule 5 mg (has no administration in time range)   tacrolimus capsule 6 mg (has no administration in time range)   venlafaxine 24 hr capsule 37.5 mg (has no administration in time range)   sodium bicarbonate tablet 1,300 mg (has no administration in time range)     Medical Decision Making:   Initial Assessment:   Ms. Read is a 27 y.o. year old  female with ESRD secondary to diabetic nephropathy ( s/p Kidney transplant 11/3/22) and HTN who presents to the emergency department due to fevers, chills, nausea and vomiting.   Differential Diagnosis:   Subacute transplant rejection  Sepsis  Shock   Pneumonia  UTI  Intra-abdominal Abscess    Clinical Tests:   Lab Tests: Ordered and Reviewed  ED Management:  Patient was examined, given his presentation and vital signs. I was concerned for sepsis.   Labs were ordered which are pending.  Blood cultures and urine cultures were obtained and sent.   Chest x-ray did not show signs of pneumonia  She was given fluids and covered with broad-spectrum antibiotics Vancomycin and Zosyn.   Discussion was had kidney transplant surgery and patient was admitted for further workup of sepsis.                             Clinical Impression:   Final diagnoses:  [R50.9] Fever  [A41.9] Sepsis        ED Disposition Condition    Admit                 Tita Loco MD  Resident  11/25/22 1100

## 2022-11-25 NOTE — HPI
Ms. Read is a 27 y.o.  female with ESRD secondary to diabetic nephropathy and HTN, now s/p SPK 11/3/22 (Thymo induction, CMV D-/R+). Her post-op course was complicated by urinary retention requiring Moncada replacement, now resolved. Moncada removed 11/8, voiding without difficulty. PVR 50-100ml, patient able to double void. Panc enzymes trended down but Amylase slightly elevated 11/7. Fasting BG normal. Panc US 11/7 was satisfactory. Drain output was 400-700ml/day. Sent for amylase and resulted 204. Drain dc'd 11/8. Patient was tolerating her diet well and was discharged on POD#7 with Cr of 1.0.   She now presents to the ED this morning 11/25 with fever, generalized malaise, n/v, and decreased PO intake X 5 days. Patient with severe GEORGE on outpatient labs done earlier today, Cr up to 5.4. Patient began experiencing dysuria several days ago and UA was done 11/23 which showed +3 leuks, >100 WBC. Ciprofloxacin was prescribed for UTI prophylaxis 11/23 while awaiting UA results. Urine cx from that time with no growth. Panc enzymes from outpt labs WNL. Discussed plan with Dr. Pettit and Dr. Cook. Concern for worsening UTI vs pyelonephritis. Will obtain STAT Kidney and pancreas US, repeat labs, infectious work-up including UA, blood cx, COVID, CMV, BK. IV Cefepime/Vanc and IVF.

## 2022-11-25 NOTE — ED TRIAGE NOTES
27 y.o. female arrived to the ED via personal transport from home for c/o emesis. Pt w/ hx of kidney/pancreas transplant, 11/2/22, reports onset of N/V x2 days w/ associated fatigue and decrease in appetite. Pt denies change in UO, chest pain, SOB, fever, chills, diarrhea, cough, or visual changes.

## 2022-11-26 PROBLEM — Z94.83 STATUS POST PANCREAS TRANSPLANTATION: Status: ACTIVE | Noted: 2022-11-26

## 2022-11-26 PROBLEM — Z94.0 IMMUNOSUPPRESSIVE MANAGEMENT ENCOUNTER FOLLOWING KIDNEY TRANSPLANT: Status: ACTIVE | Noted: 2022-11-26

## 2022-11-26 PROBLEM — Z79.899 IMMUNOSUPPRESSIVE MANAGEMENT ENCOUNTER FOLLOWING KIDNEY TRANSPLANT: Status: ACTIVE | Noted: 2022-11-26

## 2022-11-26 PROBLEM — N10 PYELONEPHRITIS, ACUTE: Status: ACTIVE | Noted: 2022-11-26

## 2022-11-26 LAB
ALBUMIN SERPL BCP-MCNC: 3.1 G/DL (ref 3.5–5.2)
AMYLASE SERPL-CCNC: 48 U/L (ref 20–110)
ANION GAP SERPL CALC-SCNC: 13 MMOL/L (ref 8–16)
BACTERIA UR CULT: NORMAL
BACTERIA UR CULT: NORMAL
BASOPHILS # BLD AUTO: 0.02 K/UL (ref 0–0.2)
BASOPHILS NFR BLD: 0.2 % (ref 0–1.9)
BUN SERPL-MCNC: 34 MG/DL (ref 6–20)
C DIFF GDH STL QL: POSITIVE
C DIFF TOX A+B STL QL IA: NEGATIVE
C DIFF TOX GENS STL QL NAA+PROBE: NEGATIVE
CALCIUM SERPL-MCNC: 8 MG/DL (ref 8.7–10.5)
CHLORIDE SERPL-SCNC: 108 MMOL/L (ref 95–110)
CO2 SERPL-SCNC: 21 MMOL/L (ref 23–29)
CREAT SERPL-MCNC: 4.5 MG/DL (ref 0.5–1.4)
DIFFERENTIAL METHOD: ABNORMAL
EOSINOPHIL # BLD AUTO: 0.3 K/UL (ref 0–0.5)
EOSINOPHIL NFR BLD: 2.8 % (ref 0–8)
ERYTHROCYTE [DISTWIDTH] IN BLOOD BY AUTOMATED COUNT: 14.7 % (ref 11.5–14.5)
EST. GFR  (NO RACE VARIABLE): 13 ML/MIN/1.73 M^2
GLUCOSE SERPL-MCNC: 101 MG/DL (ref 70–110)
HAPTOGLOB SERPL-MCNC: 305 MG/DL (ref 30–250)
HCT VFR BLD AUTO: 27.3 % (ref 37–48.5)
HGB BLD-MCNC: 9.2 G/DL (ref 12–16)
IMM GRANULOCYTES # BLD AUTO: 0.03 K/UL (ref 0–0.04)
IMM GRANULOCYTES NFR BLD AUTO: 0.3 % (ref 0–0.5)
LDH SERPL L TO P-CCNC: 322 U/L (ref 110–260)
LIPASE SERPL-CCNC: 13 U/L (ref 4–60)
LYMPHOCYTES # BLD AUTO: 0.8 K/UL (ref 1–4.8)
LYMPHOCYTES NFR BLD: 8.5 % (ref 18–48)
MAGNESIUM SERPL-MCNC: 1.6 MG/DL (ref 1.6–2.6)
MCH RBC QN AUTO: 31.3 PG (ref 27–31)
MCHC RBC AUTO-ENTMCNC: 33.7 G/DL (ref 32–36)
MCV RBC AUTO: 93 FL (ref 82–98)
MONOCYTES # BLD AUTO: 0.2 K/UL (ref 0.3–1)
MONOCYTES NFR BLD: 2.4 % (ref 4–15)
NEUTROPHILS # BLD AUTO: 7.9 K/UL (ref 1.8–7.7)
NEUTROPHILS NFR BLD: 85.8 % (ref 38–73)
NRBC BLD-RTO: 0 /100 WBC
PHOSPHATE SERPL-MCNC: 4.7 MG/DL (ref 2.7–4.5)
PLATELET # BLD AUTO: 308 K/UL (ref 150–450)
PMV BLD AUTO: 10.3 FL (ref 9.2–12.9)
POTASSIUM SERPL-SCNC: 3.7 MMOL/L (ref 3.5–5.1)
RBC # BLD AUTO: 2.94 M/UL (ref 4–5.4)
SODIUM SERPL-SCNC: 142 MMOL/L (ref 136–145)
TACROLIMUS BLD-MCNC: 10.7 NG/ML (ref 5–15)
VANCOMYCIN SERPL-MCNC: 18.8 UG/ML
WBC # BLD AUTO: 9.26 K/UL (ref 3.9–12.7)

## 2022-11-26 PROCEDURE — 25000003 PHARM REV CODE 250: Performed by: INTERNAL MEDICINE

## 2022-11-26 PROCEDURE — 83010 ASSAY OF HAPTOGLOBIN QUANT: CPT | Performed by: CLINICAL NURSE SPECIALIST

## 2022-11-26 PROCEDURE — 82150 ASSAY OF AMYLASE: CPT | Performed by: PHYSICIAN ASSISTANT

## 2022-11-26 PROCEDURE — 99223 1ST HOSP IP/OBS HIGH 75: CPT | Mod: ,,, | Performed by: INTERNAL MEDICINE

## 2022-11-26 PROCEDURE — 25000003 PHARM REV CODE 250: Performed by: NURSE PRACTITIONER

## 2022-11-26 PROCEDURE — 87493 C DIFF AMPLIFIED PROBE: CPT | Performed by: INTERNAL MEDICINE

## 2022-11-26 PROCEDURE — 83690 ASSAY OF LIPASE: CPT | Performed by: PHYSICIAN ASSISTANT

## 2022-11-26 PROCEDURE — 80202 ASSAY OF VANCOMYCIN: CPT | Performed by: PHYSICIAN ASSISTANT

## 2022-11-26 PROCEDURE — 87449 NOS EACH ORGANISM AG IA: CPT | Performed by: INTERNAL MEDICINE

## 2022-11-26 PROCEDURE — 63600175 PHARM REV CODE 636 W HCPCS: Performed by: INTERNAL MEDICINE

## 2022-11-26 PROCEDURE — 99233 PR SUBSEQUENT HOSPITAL CARE,LEVL III: ICD-10-PCS | Mod: ,,, | Performed by: INTERNAL MEDICINE

## 2022-11-26 PROCEDURE — 25000003 PHARM REV CODE 250: Performed by: PHYSICIAN ASSISTANT

## 2022-11-26 PROCEDURE — 83735 ASSAY OF MAGNESIUM: CPT | Performed by: PHYSICIAN ASSISTANT

## 2022-11-26 PROCEDURE — 99223 PR INITIAL HOSPITAL CARE,LEVL III: ICD-10-PCS | Mod: ,,, | Performed by: INTERNAL MEDICINE

## 2022-11-26 PROCEDURE — 63600175 PHARM REV CODE 636 W HCPCS: Performed by: PHYSICIAN ASSISTANT

## 2022-11-26 PROCEDURE — 86977 RBC SERUM PRETX INCUBJ/INHIB: CPT | Mod: 59 | Performed by: INTERNAL MEDICINE

## 2022-11-26 PROCEDURE — 20600001 HC STEP DOWN PRIVATE ROOM

## 2022-11-26 PROCEDURE — 85025 COMPLETE CBC W/AUTO DIFF WBC: CPT | Performed by: PHYSICIAN ASSISTANT

## 2022-11-26 PROCEDURE — 83615 LACTATE (LD) (LDH) ENZYME: CPT | Performed by: CLINICAL NURSE SPECIALIST

## 2022-11-26 PROCEDURE — 36415 COLL VENOUS BLD VENIPUNCTURE: CPT | Performed by: PHYSICIAN ASSISTANT

## 2022-11-26 PROCEDURE — 86833 HLA CLASS II HIGH DEFIN QUAL: CPT | Performed by: INTERNAL MEDICINE

## 2022-11-26 PROCEDURE — 80069 RENAL FUNCTION PANEL: CPT | Performed by: PHYSICIAN ASSISTANT

## 2022-11-26 PROCEDURE — 99233 SBSQ HOSP IP/OBS HIGH 50: CPT | Mod: ,,, | Performed by: INTERNAL MEDICINE

## 2022-11-26 PROCEDURE — 36415 COLL VENOUS BLD VENIPUNCTURE: CPT | Performed by: CLINICAL NURSE SPECIALIST

## 2022-11-26 PROCEDURE — 80197 ASSAY OF TACROLIMUS: CPT | Performed by: PHYSICIAN ASSISTANT

## 2022-11-26 PROCEDURE — 86832 HLA CLASS I HIGH DEFIN QUAL: CPT | Performed by: INTERNAL MEDICINE

## 2022-11-26 RX ORDER — TACROLIMUS 1 MG/1
5 CAPSULE ORAL 2 TIMES DAILY
Status: DISCONTINUED | OUTPATIENT
Start: 2022-11-26 | End: 2022-11-27

## 2022-11-26 RX ORDER — LOPERAMIDE HYDROCHLORIDE 2 MG/1
2 CAPSULE ORAL ONCE AS NEEDED
Status: COMPLETED | OUTPATIENT
Start: 2022-11-26 | End: 2022-11-26

## 2022-11-26 RX ORDER — LOPERAMIDE HYDROCHLORIDE 2 MG/1
2 CAPSULE ORAL ONCE AS NEEDED
Status: COMPLETED | OUTPATIENT
Start: 2022-11-26 | End: 2022-11-27

## 2022-11-26 RX ORDER — SODIUM CHLORIDE 9 MG/ML
INJECTION, SOLUTION INTRAVENOUS CONTINUOUS
Status: DISCONTINUED | OUTPATIENT
Start: 2022-11-26 | End: 2022-11-28

## 2022-11-26 RX ORDER — VANCOMYCIN HCL IN 5 % DEXTROSE 1G/250ML
15 PLASTIC BAG, INJECTION (ML) INTRAVENOUS ONCE
Status: COMPLETED | OUTPATIENT
Start: 2022-11-26 | End: 2022-11-26

## 2022-11-26 RX ADMIN — TACROLIMUS 5 MG: 1 CAPSULE ORAL at 05:11

## 2022-11-26 RX ADMIN — SODIUM CHLORIDE: 0.9 INJECTION, SOLUTION INTRAVENOUS at 09:11

## 2022-11-26 RX ADMIN — VANCOMYCIN HYDROCHLORIDE 1000 MG: 1 INJECTION, POWDER, LYOPHILIZED, FOR SOLUTION INTRAVENOUS at 12:11

## 2022-11-26 RX ADMIN — SODIUM BICARBONATE 650 MG TABLET 1300 MG: at 03:11

## 2022-11-26 RX ADMIN — CEFEPIME HYDROCHLORIDE 1 G: 1 INJECTION, SOLUTION INTRAVENOUS at 12:11

## 2022-11-26 RX ADMIN — PREDNISONE 10 MG: 10 TABLET ORAL at 08:11

## 2022-11-26 RX ADMIN — LOPERAMIDE HYDROCHLORIDE 2 MG: 2 CAPSULE ORAL at 05:11

## 2022-11-26 RX ADMIN — CEFEPIME HYDROCHLORIDE 1 G: 1 INJECTION, SOLUTION INTRAVENOUS at 11:11

## 2022-11-26 RX ADMIN — SODIUM CHLORIDE: 0.9 INJECTION, SOLUTION INTRAVENOUS at 11:11

## 2022-11-26 RX ADMIN — NYSTATIN 500000 UNITS: 500000 SUSPENSION ORAL at 08:11

## 2022-11-26 RX ADMIN — SODIUM BICARBONATE 650 MG TABLET 1300 MG: at 08:11

## 2022-11-26 RX ADMIN — NYSTATIN 500000 UNITS: 500000 SUSPENSION ORAL at 03:11

## 2022-11-26 RX ADMIN — FAMOTIDINE 20 MG: 20 TABLET ORAL at 08:11

## 2022-11-26 RX ADMIN — TACROLIMUS 6 MG: 1 CAPSULE ORAL at 08:11

## 2022-11-26 RX ADMIN — VENLAFAXINE HYDROCHLORIDE 37.5 MG: 37.5 CAPSULE, EXTENDED RELEASE ORAL at 08:11

## 2022-11-26 NOTE — ASSESSMENT & PLAN NOTE
28 y/o F h/o DM/HTN nephropathy c/b ESRD s/p SPK 11/3/22 (Thymo induction, CMV D-/R+, tacro/mmf/pred) urinary retention presented to ED 11/25 with fevers, malaise, n/v, dysuria, on admit is febrile with GEORGE (Cr 1->4.5).  cultures thus far NGTD.  - suspect possible pylonephritis  - agree with cefepime and vancomycin  - check RIP  - discussed with team will follow

## 2022-11-26 NOTE — HPI
28 y/o F h/o DM/HTN nephropathy c/b ESRD s/p SPK 11/3/22 (Thymo induction, CMV D-/R+, tacro/mmf/pred) urinary retention presented to ED 11/25 with fevers, malaise, n/v, dysuria, on admit is febrile with GEORGE (Cr 1->4.5).  cultures thus far NGTD.

## 2022-11-26 NOTE — PROGRESS NOTES
"Consult Requested By: Mihai Cook MD  Reason for Consult: management of immunosuppressive agents, CKD    SUBJECTIVE:   Patient/family  and nurse report  the following issues today:    S/p KP 23 days ago. Admitted with 4 days of feeling bad, poor PO intake and fever, she called coordinator on Wednesday to report UTI symptoms. Urine culture ordered and start cipro oral. Culture came back to Growth. In the ER found tachy and febrile with a "dirty urine" CULTURES ORDERED SO FAR nO [facility]     Kidney US showed edematous kidney suspicious for Pyelonephritis ? Rejection     Today patient feels better. No fever for 24 hrs       Review of Systems:    Skin: no skin rash  CNS; no headaches, blurred vision, seizure, or syncope  ENT: No JVD,  Adenopathies,  nasal congestion. No oral lesions  Cardiac: No chest pain, dyspnea, claudication, edema or palpitations  Respiratory: No SOB, cough, hemoptysis   Gastro-intestinal: No diarrhea, constipation, abdominal pain, nausea, vomit. No ascitis  Genitourinary: no hematuria, dysuria, frequency, frequency  Musculoskeletal: joint pain, arthritis or vasculitic changes  Psych: alert awake, oriented, No cranial nerves deficit.  Patient Active Problem List   Diagnosis    Renovascular hypertension    Ventricular bigeminy    Vitamin D deficiency    Mixed hyperlipidemia    Anemia in ESRD (end-stage renal disease)    Iron deficiency anemia    Hyperkalemia    Anxiety    Recurrent major depressive disorder, in partial remission    Metabolic acidosis    Anemia due to chronic kidney disease    Nephrotic syndrome    Secondary hyperparathyroidism    GEORGE (acute kidney injury)    Acute kidney injury superimposed on chronic kidney disease    Hepatomegaly    Hypertension, essential    Iron overload    Acute angle-closure glaucoma of right eye    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, associated with type 1 diabetes mellitus    Insomnia "    Gastroparesis    Hypocalcemia    Major depressive disorder, single episode, unspecified    (HFpEF) heart failure with preserved ejection fraction    Sinus tachycardia    Abnormal CT scan of lung    Status post simultaneous kidney and pancreas transplant    At risk for opportunistic infections    Prophylactic immunotherapy    Long-term use of immunosuppressant medication    Anemia of chronic disease    Acute cystitis without hematuria    Sepsis       OBJECTIVE:     Medications:   carvediloL  25 mg Oral BID    ceFEPime (MAXIPIME) IVPB  1 g Intravenous Q12H    famotidine  20 mg Oral QHS    heparin (porcine)  5,000 Units Subcutaneous Q8H    NIFEdipine  60 mg Oral BID    nystatin  5 mL Oral QID (PC + HS)    [START ON 11/27/2022] predniSONE  5 mg Oral Daily    sodium bicarbonate  1,300 mg Oral TID    [START ON 11/28/2022] sulfamethoxazole-trimethoprim 400-80mg  1 tablet Oral Every Mon, Wed, Fri    tacrolimus  5 mg Oral Daily PM    tacrolimus  6 mg Oral Daily AM    [START ON 11/28/2022] valGANciclovir  450 mg Oral Every Mon, Wed, Fri    venlafaxine  37.5 mg Oral Daily      sodium bicarbonate drip 100 mL/hr at 11/25/22 2304     Vitals:    11/26/22 0951   BP: (!) 95/53   Pulse:    Resp:    Temp:      I/O last 3 completed shifts:  In: 2386.7 [I.V.:1336.7; IV Piggyback:1050]  Out: 1600 [Urine:1600]    PHYSICAL EXAM:    Head: normocephalic  Neck: No JVD, cervical axillary, or femoral adenopathies  Heart: no murmurs, Normal s1 and s2, No gallops, no rubs, No murmurs  Lungs; CTA, good respiratory effort, no crackles  Abdomen: soft, non tender, no splenomegaly or hepatomegaly, no massess, no bruits  Extremities: No edema, skin rash, joint pain  SNC: awake, alert oriented. Cranial nerves are intact, no focalized, sensitivity and strength preserved    Laboratory:  Recent Labs   Lab 11/25/22  0838 11/25/22  1051 11/26/22  0727   WBC 11.01 10.84 9.26   HGB 9.8* 9.3* 9.2*   HCT 29.2* 27.9* 27.3*    336 308   MONO 3.1*  0.3  3.3*  0.4 2.4*  0.2*     Recent Labs   Lab 11/25/22  0838 11/25/22  1051 11/25/22 2038 11/26/22  0727   * 135* 138 142   K 4.4 5.5* 4.6 3.7    102 105 108   CO2 20* 17* 14* 21*   BUN 42* 43* 38* 34*   CREATININE 5.4* 5.2* 4.5* 4.5*   CALCIUM 8.4* 8.4* 7.7* 8.0*   PHOS 5.0* 5.1*  --  4.7*       Labs reviewed  Diagnostic Results:  X-Ray: Reviewed  US: Reviewed  Echo: Reviewed      ASSESSMENT/PLAN:     1. Immunosuppression:hold MMF so far, will wait for final; cultures results     2. Allograft Function/complications: severe Barbra with marginal improvement after IV fluids. Kidney US is suspicious for pyelonephritis less likely rejection due to presentation. Her tacro level were variable some of them high other los, creatinine was normal until 11/21          3. Electrolyte and fluid balance Abnormalities:reviewed will monitor     4. Essential Hypertension management/Blood pressure control:low normal will continue with IV fluids for now     5. Anemia associated with kidney disease:h/h stable, will monitor     6.Proteinuria and CKD management:ckd stage 2 with severe BARBRA see above will r/o acute infection treat with broad spectrum antibiotics and then consider a kidney biopsy     7.Active Infections/malignancy complications immunosuppressed host:work up in process for possible pyelonephrtis        Thank you.    Mihai Cook MD  Transplant Nephrologist  [facility]  [facility].

## 2022-11-26 NOTE — CONSULTS
[facility] - Transplant Stepdown  Infectious Disease  Consult Note    Patient Name: Isabela Read  MRN: [id number]  Admission Date: 11/25/2022  Hospital Length of Stay: 1 days  Attending Physician: Mihai Cook MD  Primary Care Provider: Tavo Bhatia MD     Isolation Status: No active isolations    Inpatient consult to Infectious Diseases  Consult performed by: Jose Hester MD  Consult ordered by: Mihai Cook MD  Reason for consult: see consult note        Assessment/Plan:

## 2022-11-26 NOTE — PROGRESS NOTES
Pharmacokinetic Assessment Follow Up: IV Vancomycin    Vancomycin serum concentration assessment(s):    The random level was drawn correctly and can be used to guide therapy at this time. The measurement is within the desired definitive target range of 15 to 20 mcg/mL.    Vancomycin Regimen Plan:    Will give 1000 mg IV vanco today x 1. Check Random level with AM labs 11/27/22    Drug levels (last 3 results):  Recent Labs   Lab Result Units 11/26/22  1029   Vancomycin, Random ug/mL 18.8       Pharmacy will continue to follow and monitor vancomycin.    Please contact pharmacy at extension [phone] for questions regarding this assessment.    Thank you for the consult,   Hitesh Rubalcava       Patient brief summary:  Isabela Read is a 27 y.o. female initiated on antimicrobial therapy with IV Vancomycin for treatment of urinary tract infection    The patient's current regimen is pulse dosing with GEORGE    Drug Allergies:   Review of patient's allergies indicates:  No Known Allergies    Actual Body Weight:   61.3 kg    Renal Function:   Estimated Creatinine Clearance: 16.2 mL/min (A) (based on SCr of 4.5 mg/dL (H)).,     Dialysis Method (if applicable):  GEORGE post kidney transplant     CBC (last 72 hours):  Recent Labs   Lab Result Units 11/25/22  0838 11/25/22  1051 11/26/22  0727   WBC K/uL 11.01 10.84 9.26   Hemoglobin g/dL 9.8* 9.3* 9.2*   Hematocrit % 29.2* 27.9* 27.3*   Platelets K/uL 309 336 308   Gran % % 92.2* 91.2* 85.8*   Lymph % % 2.5* 3.0* 8.5*   Mono % % 3.1* 3.3* 2.4*   Eosinophil % % 1.8 1.7 2.8   Basophil % % 0.1 0.2 0.2   Differential Method  Automated Automated Automated       Metabolic Panel (last 72 hours):  Recent Labs   Lab Result Units 11/23/22  1327 11/25/22  0838 11/25/22  1051 11/25/22  1100 11/25/22  2038 11/26/22  0727   Sodium mmol/L  --  134* 135*  --  138 142   Potassium mmol/L  --  4.4 5.5*  --  4.6 3.7   Chloride mmol/L  --  102 102  --  105 108   CO2 mmol/L  --  20* 17*  --  14* 21*    Glucose mg/dL  --  106 104  --  125* 101   Glucose, UA  Negative  --   --  Negative  --   --    BUN mg/dL  --  42* 43*  --  38* 34*   Creatinine mg/dL  --  5.4* 5.2*  --  4.5* 4.5*   Albumin g/dL  --  3.6 3.7  --   --  3.1*   Total Bilirubin mg/dL  --   --  0.3  --   --   --    Alkaline Phosphatase U/L  --   --  100  --   --   --    AST U/L  --   --  25  --   --   --    ALT U/L  --   --  20  --   --   --    Magnesium mg/dL  --  1.7 1.7  --   --  1.6   Phosphorus mg/dL  --  5.0* 5.1*  --   --  4.7*       Vancomycin Administrations:  vancomycin given in the last 96 hours                     vancomycin 1.25 g in dextrose 5% 250 mL IVPB (ready to mix) (mg) 1,250 mg New Bag 11/25/22 1158                    Microbiologic Results:  Microbiology Results (last 7 days)       Procedure Component Value Units Date/Time    Clostridium difficile EIA [[id number]] Collected: 11/26/22 0932    Order Status: Sent Specimen: Stool Updated: 11/26/22 1038    Blood culture x two cultures. Draw prior to antibiotics. [[id number]] Collected: 11/25/22 1052    Order Status: Completed Specimen: Blood from Peripheral, Hand, Right Updated: 11/25/22 1915     Blood Culture, Routine No Growth to date    Narrative:      Aerobic and anaerobic    Blood culture x two cultures. Draw prior to antibiotics. [[id number]] Collected: 11/25/22 1052    Order Status: Completed Specimen: Blood from Peripheral, Wrist, Right Updated: 11/25/22 1915     Blood Culture, Routine No Growth to date    Narrative:      Aerobic and anaerobic    Urine culture [[id number]] Collected: 11/25/22 1100    Order Status: No result Specimen: Urine Updated: 11/25/22 1131

## 2022-11-26 NOTE — SUBJECTIVE & OBJECTIVE
Past Medical History:   Diagnosis Date    Acute kidney injury superimposed on chronic kidney disease 4/7/2021    Anemia     Chronic hypertension with exacerbation during pregnancy in second trimester 11/6/2020    Current regimen (11/6/20):  - Carvedilol 12.5 mg BID - Nifedipine 30 mg daily Baseline CKD + proteinuria    Chronic kidney disease     Depression     Diabetes mellitus     Diabetic retinopathy     Diarrhea     Encounter for blood transfusion     End stage renal failure on dialysis     Gastroparesis     Glaucoma     Hepatomegaly 4/29/2021    Hx of psychiatric care     Hyperlipidemia     Hypertension     Nephrotic syndrome     Nephrotic syndrome 3/4/2021    Nonspecific reaction to tuberculin skin test without active tuberculosis 10/1/2021    Palpitations     Poor fetal growth affecting management of mother in second trimester 10/15/2020    Restrictive lung disease     Retinal detachment     Severe pre-eclampsia in second trimester 11/6/2020    Type 1 diabetes mellitus with end-stage renal disease (ESRD) 2/24/2015    Followed by Dr. Mayo.  Last A1C was 13  Currently on lantus 20 units qAM and humolog 10 units with meals  - a fetal echocardiogram around 22-24 weeks - needs eye exam, podiatry exam  - needs EKG, maternal echo and fu with cardiology  - ASA 81mg, folic acid 4mg PO daily - hemoglobin A1c every 4-6 weeks -24 hour urine for protein and creatinine clearance should be performed. Patient will also need a       Past Surgical History:   Procedure Laterality Date    AV FISTULA PLACEMENT Left 4/7/2021    Procedure: CREATION, AV FISTULA;  Surgeon: Roberto Ryan MD;  Location: [facility] OR [address] [place] [address];  Service: Peripheral Vascular;  Laterality: Left;    COLONOSCOPY N/A 3/16/2022    Procedure: COLONOSCOPY;  Surgeon: Tavo Kwok MD;  Location: [facility] (4TH FLR);  Service: Endoscopy;  Laterality: N/A;  Questionable history of delayed gastric emptying longstanding diabetes now on eating pre transplant  workup for history of nausea vomiting which seems to have improved with dialysis also chronic diarrhea and history of anemia pre transplant workup for kidney transplant. 3    ESOPHAGOGASTRODUODENOSCOPY N/A 3/16/2022    Procedure: EGD (ESOPHAGOGASTRODUODENOSCOPY);  Surgeon: Tavo Kwok MD;  Location: [facility] (4TH FLR);  Service: Endoscopy;  Laterality: N/A;  Questionable history of delayed gastric emptying longstanding diabetes now on eating pre transplant workup for history of nausea vomiting which seems to have improved with dialysis also chronic diarrhea and history of anemia pre transplant workup for    FISTULOGRAM N/A 8/11/2021    Procedure: Fistulogram;  Surgeon: Roberto Ryan MD;  Location: [facility] CATH LAB;  Service: Cardiology;  Laterality: N/A;    KIDNEY TRANSPLANT Right 11/2/2022    Procedure: TRANSPLANT, KIDNEY;  Surgeon: Kumar Rodriges Jr., MD;  Location: [facility] OR 2ND FLR;  Service: Transplant;  Laterality: Right;    LASER PHOTOCOAGULATION OF RETINA Right 5/31/2022    Procedure: PHOTOCOAGULATION, RETINA, USING LASER;  Surgeon: Maximilian Montaño MD;  Location: [facility] OR 1ST FLR;  Service: Ophthalmology;  Laterality: Right;    PERCUTANEOUS TRANSLUMINAL ANGIOPLASTY OF ARTERIOVENOUS FISTULA N/A 8/11/2021    Procedure: PTA, AV FISTULA;  Surgeon: Roberto Ryan MD;  Location: [facility] CATH LAB;  Service: Cardiology;  Laterality: N/A;    REMOVAL IMPLANT, POSTERIOR SEGMENT, INTRAOCULAR Right 2/1/2022    Procedure: REMOVAL IMPLANT, POSTERIOR SEGMENT, INTRAOCULAR;  Surgeon: Maximilian Montaño MD;  Location: [facility] 1ST FLR;  Service: Ophthalmology;  Laterality: Right;    REPAIR OF RETINAL DETACHMENT WITH VITRECTOMY Right 1/25/2022    Procedure: REPAIR, RETINAL DETACHMENT, WITH VITRECTOMY, MEMBRANE PEEL, LASER, INJECTION OF GAS VS OIL;  Surgeon: Maximilian Montaño MD;  Location: [facility] OR 1ST FLR;  Service: Ophthalmology;  Laterality: Right;    REVISION OF ARTERIOVENOUS FISTULA Left 12/10/2021     Procedure: REVISION, AV FISTULA with BVT;  Surgeon: Roberto Ryan MD;  Location: [facility] OR 2ND FLR;  Service: Peripheral Vascular;  Laterality: Left;    TRANSPLANTATION OF PANCREAS N/A 11/2/2022    Procedure: TRANSPLANT, PANCREAS;  Surgeon: Kumar Rodriges Jr., MD;  Location: [facility] OR 2ND FLR;  Service: Transplant;  Laterality: N/A;    VITRECTOMY BY PARS PLANA APPROACH Right 2/1/2022    Procedure: VITRECTOMY, PARS PLANA APPROACH;  Surgeon: Maximilian Montaño MD;  Location: [facility] OR 1ST FLR;  Service: Ophthalmology;  Laterality: Right;    VITRECTOMY BY PARS PLANA APPROACH Right 5/31/2022    Procedure: VITRECTOMY, PARS PLANA APPROACH;  Surgeon: Maximilian Montaño MD;  Location: [facility] OR 1ST FLR;  Service: Ophthalmology;  Laterality: Right;       Review of patient's allergies indicates:  No Known Allergies    Medications:  Medications Prior to Admission   Medication Sig    aspirin (ECOTRIN) 81 MG EC tablet Take 81 mg by mouth once daily.    carvediloL (COREG) 25 MG tablet Take 1 tablet (25 mg total) by mouth 2 (two) times a day.    ciprofloxacin HCl (CIPRO) 250 MG tablet Take 1 tablet by mouth twice daily for 5 days    ergocalciferol (ERGOCALCIFEROL) 50,000 unit Cap Take 1 capsule (50,000 Units total) by mouth every 7 days.    famotidine (PEPCID) 20 MG tablet Take 1 tablet (20 mg total) by mouth once daily. Stop 11/27/22 for 19 days    k phos di & mono-sod phos mono (K-PHOS-NEUTRAL) 250 mg Tab Take 2 tablets by mouth 3 (three) times daily for 4 days, THEN 2 tablets 2 (two) times a day.    magnesium oxide (MAG-OX) 400 mg (241.3 mg magnesium) tablet Take 1 tablet (400 mg total) by mouth 2 (two) times daily.    multivitamin (THERAGRAN) per tablet Take 1 tablet by mouth once daily.    mycophenolate (CELLCEPT) 250 mg Cap Take 4 capsules (1,000 mg total) by mouth 2 (two) times daily.    NIFEdipine (PROCARDIA-XL) 60 MG (OSM) 24 hr tablet Take 1 tablet (60 mg total) by mouth 2 (two) times a day. HOLD FOR SBP <130     nystatin (MYCOSTATIN) 100,000 unit/mL suspension Take 5 mLs (500,000 Units total) by mouth 2 hours after meals and at bedtime. for 21 days    ondansetron (ZOFRAN-ODT) 8 MG TbDL DISSOLVE 1 tablet (8 mg total) by mouth every 8 (eight) hours as needed (nausea).    predniSONE (DELTASONE) 5 MG tablet From 11/6 to 11/12 take 20mg by mouth once daily;  From 11/13 to 11/19 take 15mg daily;  From 11/20 to 11/26 take 10mg daily;  From 11/27/22 start 5mg daily thereafter    rosuvastatin (CRESTOR) 10 MG tablet Take 1 tablet (10 mg total) by mouth every evening.    sodium bicarbonate 650 MG tablet Take 2 tablets (1,300 mg total) by mouth 3 (three) times daily.    sulfamethoxazole-trimethoprim 400-80mg (BACTRIM,SEPTRA) 400-80 mg per tablet Take 1 tablet by mouth once daily. Stop 5/2/2023    tacrolimus (PROGRAF) 1 MG Cap Take 6 capsules (6 mg total) by mouth every morning AND 5 capsules (5 mg total) every evening.    valGANciclovir (VALCYTE) 450 mg Tab Take 2 tablets (900 mg total) by mouth once daily. Stop 2/2/2023    venlafaxine (EFFEXOR XR) 37.5 MG 24 hr capsule Take 1 capsule (37.5 mg total) by mouth once daily.     Antibiotics (From admission, onward)      Start     Stop Route Frequency Ordered    11/28/22 0900  sulfamethoxazole-trimethoprim 400-80mg per tablet 1 tablet         -- Oral Every Mon, Wed, Fri 11/25/22 1157    11/26/22 1245  vancomycin in dextrose 5 % 1 gram/250 mL IVPB 1,000 mg         -- IV Once 11/26/22 1131    11/25/22 1200  cefepime in dextrose 5 % 1 gram/50 mL IVPB 1 g         -- IV Every 12 hours (non-standard times) 11/25/22 1054    11/25/22 1154  vancomycin - pharmacy to dose  (vancomycin IVPB)        See Edu for full Linked Orders Report.    -- IV pharmacy to manage frequency 11/25/22 1055          Antifungals (From admission, onward)      Start     Stop Route Frequency Ordered    11/25/22 2100  nystatin 100,000 unit/mL suspension 500,000 Units         -- Oral 4 times daily after meals & nightly  11/25/22 1059          Antivirals (From admission, onward)          Stop Route Frequency     Valcyte         -- Oral Every Mon, Wed, Fri             Immunization History   Administered Date(s) Administered    COVID-19, MRNA, LN-S, PF (MODERNA FULL 0.5 ML DOSE) 06/01/2021, 06/29/2021    HIB 1995, 1995, 1995, 02/12/1996    HPV Quadrivalent 09/12/2013    Hepatitis B, Pediatric/Adolescent 1995, 1995, 1995    IPV 1995, 1995, 1995, 10/09/1999    Influenza - Quadrivalent - PF *Preferred* (6 months and older) 02/14/2011, 10/21/2019    Influenza - Trivalent - PF (ADULT) 02/14/2011    MMR 02/12/1996, 08/10/1999    Meningococcal Conjugate (MCV4P) 04/24/2009, 09/12/2013    PPD Test 05/20/2021    Pneumococcal Polysaccharide - 23 Valent 01/05/2016    Tdap 1995, 1995, 1995, 05/31/1996, 08/10/1999, 04/24/2009, 02/14/2011, 09/12/2013    Varicella 09/12/2013       Family History       Problem Relation (Age of Onset)    Diabetes Brother    Heart disease Father    Hypertension Mother          Social History     Socioeconomic History    Marital status: Single   Occupational History    Occupation:  at [employer]   Tobacco Use    Smoking status: Never    Smokeless tobacco: Never   Substance and Sexual Activity    Alcohol use: No    Drug use: No    Sexual activity: Not Currently     Partners: Male     Comment: monogamous   Social History Narrative    Caregiver        Single    No kids    Had Miscarriage  At 23 weeks from preeclampsia    Disabled     Review of Systems   Constitutional:  Positive for chills and fatigue. Negative for activity change and fever.   HENT:  Negative for congestion, mouth sores, rhinorrhea, sinus pressure and sore throat.    Eyes:  Negative for photophobia, pain and redness.   Respiratory:  Negative for cough, chest tightness, shortness of breath and wheezing.    Cardiovascular:  Negative for chest pain and leg swelling.   Gastrointestinal:   Positive for diarrhea. Negative for abdominal distention, abdominal pain, nausea and vomiting.   Endocrine: Negative for polyuria.   Genitourinary:  Positive for dysuria. Negative for decreased urine volume and flank pain.   Musculoskeletal:  Negative for joint swelling and neck pain.   Skin:  Negative for color change.   Allergic/Immunologic: Negative for food allergies.   Neurological:  Negative for dizziness, weakness and headaches.   Hematological:  Negative for adenopathy.   Psychiatric/Behavioral:  Negative for agitation and confusion. The patient is not nervous/anxious.    Objective:     Vital Signs (Most Recent):  Temp: 99.6 °F (37.6 °C) (11/26/22 1144)  Pulse: (!) 128 (11/26/22 1144)  Resp: 18 (11/26/22 1144)  BP: 119/65 (11/26/22 1144)  SpO2: 97 % (11/26/22 1144)   Vital Signs (24h Range):  Temp:  [98.1 °F (36.7 °C)-100.6 °F (38.1 °C)] 99.6 °F (37.6 °C)  Pulse:  [] 128  Resp:  [18-20] 18  SpO2:  [97 %-100 %] 97 %  BP: ()/(53-96) 119/65     Weight: 61.3 kg (135 lb 3.2 oz)  Body mass index is 23.21 kg/m².    Estimated Creatinine Clearance: 16.2 mL/min (A) (based on SCr of 4.5 mg/dL (H)).    Physical Exam  Constitutional:       Appearance: She is well-developed.   HENT:      Head: Normocephalic and atraumatic.   Eyes:      Pupils: Pupils are equal, round, and reactive to light.   Cardiovascular:      Rate and Rhythm: Normal rate.   Pulmonary:      Effort: Pulmonary effort is normal.      Breath sounds: Normal breath sounds.   Abdominal:      General: Bowel sounds are normal.      Palpations: Abdomen is soft.      Comments: Midline op site staples in place c/d/i   Musculoskeletal:         General: No tenderness.      Cervical back: Normal range of motion and neck supple.   Skin:     General: Skin is warm and dry.   Neurological:      Mental Status: She is alert and oriented to person, place, and time.       Significant Labs: CBC:   Recent Labs   Lab 11/25/22  0838 11/25/22  1051 11/26/22  0727    WBC 11.01 10.84 9.26   HGB 9.8* 9.3* 9.2*   HCT 29.2* 27.9* 27.3*    336 308     CMP:   Recent Labs   Lab 11/25/22  0838 11/25/22  1051 11/25/22 2038 11/26/22 0727   * 135* 138 142   K 4.4 5.5* 4.6 3.7    102 105 108   CO2 20* 17* 14* 21*    104 125* 101   BUN 42* 43* 38* 34*   CREATININE 5.4* 5.2* 4.5* 4.5*   CALCIUM 8.4* 8.4* 7.7* 8.0*   PROT  --  8.0  --   --    ALBUMIN 3.6 3.7  --  3.1*   BILITOT  --  0.3  --   --    ALKPHOS  --  100  --   --    AST  --  25  --   --    ALT  --  20  --   --    ANIONGAP 12 16 19* 13       Significant Imaging: I have reviewed all pertinent imaging results/findings within the past 24 hours.

## 2022-11-27 LAB
ALBUMIN SERPL BCP-MCNC: 2.8 G/DL (ref 3.5–5.2)
AMYLASE SERPL-CCNC: 48 U/L (ref 20–110)
ANION GAP SERPL CALC-SCNC: 11 MMOL/L (ref 8–16)
BASOPHILS # BLD AUTO: 0.02 K/UL (ref 0–0.2)
BASOPHILS NFR BLD: 0.2 % (ref 0–1.9)
BUN SERPL-MCNC: 25 MG/DL (ref 6–20)
CALCIUM SERPL-MCNC: 8 MG/DL (ref 8.7–10.5)
CHLORIDE SERPL-SCNC: 110 MMOL/L (ref 95–110)
CO2 SERPL-SCNC: 17 MMOL/L (ref 23–29)
CREAT SERPL-MCNC: 3.4 MG/DL (ref 0.5–1.4)
DIFFERENTIAL METHOD: ABNORMAL
EOSINOPHIL # BLD AUTO: 0.4 K/UL (ref 0–0.5)
EOSINOPHIL NFR BLD: 4.3 % (ref 0–8)
ERYTHROCYTE [DISTWIDTH] IN BLOOD BY AUTOMATED COUNT: 14.8 % (ref 11.5–14.5)
EST. GFR  (NO RACE VARIABLE): 18.2 ML/MIN/1.73 M^2
GLUCOSE SERPL-MCNC: 96 MG/DL (ref 70–110)
HCT VFR BLD AUTO: 26.4 % (ref 37–48.5)
HGB BLD-MCNC: 8.9 G/DL (ref 12–16)
IMM GRANULOCYTES # BLD AUTO: 0.03 K/UL (ref 0–0.04)
IMM GRANULOCYTES NFR BLD AUTO: 0.4 % (ref 0–0.5)
LIPASE SERPL-CCNC: 13 U/L (ref 4–60)
LYMPHOCYTES # BLD AUTO: 0.9 K/UL (ref 1–4.8)
LYMPHOCYTES NFR BLD: 11 % (ref 18–48)
MAGNESIUM SERPL-MCNC: 1.6 MG/DL (ref 1.6–2.6)
MCH RBC QN AUTO: 31.4 PG (ref 27–31)
MCHC RBC AUTO-ENTMCNC: 33.7 G/DL (ref 32–36)
MCV RBC AUTO: 93 FL (ref 82–98)
MONOCYTES # BLD AUTO: 0.2 K/UL (ref 0.3–1)
MONOCYTES NFR BLD: 2 % (ref 4–15)
NEUTROPHILS # BLD AUTO: 6.7 K/UL (ref 1.8–7.7)
NEUTROPHILS NFR BLD: 82.1 % (ref 38–73)
NRBC BLD-RTO: 0 /100 WBC
PHOSPHATE SERPL-MCNC: 3.5 MG/DL (ref 2.7–4.5)
PLATELET # BLD AUTO: 350 K/UL (ref 150–450)
PMV BLD AUTO: 10.2 FL (ref 9.2–12.9)
POTASSIUM SERPL-SCNC: 3.8 MMOL/L (ref 3.5–5.1)
RBC # BLD AUTO: 2.83 M/UL (ref 4–5.4)
SODIUM SERPL-SCNC: 138 MMOL/L (ref 136–145)
TACROLIMUS BLD-MCNC: 14 NG/ML (ref 5–15)
VANCOMYCIN SERPL-MCNC: 26 UG/ML
WBC # BLD AUTO: 8.17 K/UL (ref 3.9–12.7)

## 2022-11-27 PROCEDURE — 99233 PR SUBSEQUENT HOSPITAL CARE,LEVL III: ICD-10-PCS | Mod: ,,, | Performed by: INTERNAL MEDICINE

## 2022-11-27 PROCEDURE — 25000003 PHARM REV CODE 250: Performed by: PHYSICIAN ASSISTANT

## 2022-11-27 PROCEDURE — 25000003 PHARM REV CODE 250: Performed by: INTERNAL MEDICINE

## 2022-11-27 PROCEDURE — 83690 ASSAY OF LIPASE: CPT | Performed by: PHYSICIAN ASSISTANT

## 2022-11-27 PROCEDURE — 80069 RENAL FUNCTION PANEL: CPT | Performed by: PHYSICIAN ASSISTANT

## 2022-11-27 PROCEDURE — 63600175 PHARM REV CODE 636 W HCPCS: Performed by: PHYSICIAN ASSISTANT

## 2022-11-27 PROCEDURE — 63600175 PHARM REV CODE 636 W HCPCS: Performed by: INTERNAL MEDICINE

## 2022-11-27 PROCEDURE — 99233 SBSQ HOSP IP/OBS HIGH 50: CPT | Mod: ,,, | Performed by: INTERNAL MEDICINE

## 2022-11-27 PROCEDURE — 85025 COMPLETE CBC W/AUTO DIFF WBC: CPT | Performed by: PHYSICIAN ASSISTANT

## 2022-11-27 PROCEDURE — 25000003 PHARM REV CODE 250: Performed by: NURSE PRACTITIONER

## 2022-11-27 PROCEDURE — 80202 ASSAY OF VANCOMYCIN: CPT | Performed by: INTERNAL MEDICINE

## 2022-11-27 PROCEDURE — 20600001 HC STEP DOWN PRIVATE ROOM

## 2022-11-27 PROCEDURE — 80197 ASSAY OF TACROLIMUS: CPT | Performed by: PHYSICIAN ASSISTANT

## 2022-11-27 PROCEDURE — 83735 ASSAY OF MAGNESIUM: CPT | Performed by: PHYSICIAN ASSISTANT

## 2022-11-27 PROCEDURE — 82150 ASSAY OF AMYLASE: CPT | Performed by: PHYSICIAN ASSISTANT

## 2022-11-27 RX ADMIN — LOPERAMIDE HYDROCHLORIDE 2 MG: 2 CAPSULE ORAL at 12:11

## 2022-11-27 RX ADMIN — NYSTATIN 500000 UNITS: 500000 SUSPENSION ORAL at 08:11

## 2022-11-27 RX ADMIN — CEFEPIME HYDROCHLORIDE 1 G: 1 INJECTION, SOLUTION INTRAVENOUS at 12:11

## 2022-11-27 RX ADMIN — NYSTATIN 500000 UNITS: 500000 SUSPENSION ORAL at 09:11

## 2022-11-27 RX ADMIN — FAMOTIDINE 20 MG: 20 TABLET ORAL at 08:11

## 2022-11-27 RX ADMIN — VENLAFAXINE HYDROCHLORIDE 37.5 MG: 37.5 CAPSULE, EXTENDED RELEASE ORAL at 08:11

## 2022-11-27 RX ADMIN — CEFEPIME HYDROCHLORIDE 1 G: 1 INJECTION, SOLUTION INTRAVENOUS at 11:11

## 2022-11-27 RX ADMIN — NYSTATIN 500000 UNITS: 500000 SUSPENSION ORAL at 02:11

## 2022-11-27 RX ADMIN — SODIUM BICARBONATE 650 MG TABLET 1300 MG: at 08:11

## 2022-11-27 RX ADMIN — TACROLIMUS 5 MG: 1 CAPSULE ORAL at 08:11

## 2022-11-27 RX ADMIN — NIFEDIPINE 30 MG: 30 TABLET, FILM COATED, EXTENDED RELEASE ORAL at 08:11

## 2022-11-27 RX ADMIN — SODIUM BICARBONATE 650 MG TABLET 1300 MG: at 02:11

## 2022-11-27 RX ADMIN — PREDNISONE 5 MG: 5 TABLET ORAL at 08:11

## 2022-11-27 RX ADMIN — SODIUM CHLORIDE: 0.9 INJECTION, SOLUTION INTRAVENOUS at 06:11

## 2022-11-27 NOTE — PROGRESS NOTES
"KIDNEY TRANSPLANT MEDICINE PROGRESS NOTE    Please refer to detailed progress note from [facility]UR[facility] 11/26 and 11/25 for complete details of this admission.    Interval history: patient feels better No complaints today No nausea vomit or diarrhea. No fever for almost 48 hrs    Past medical, surgical, family, social history reviewed & unchanged since admission.     I/O last 3 completed shifts:  In: 5745 [P.O.:1720; I.V.:2925; IV Piggyback:1100]  Out: 5250 [Urine:5250]    VITALS:  height is 5' 4" (1.626 m) and weight is 61.3 kg (135 lb 3.2 oz). Her oral temperature is 98.5 °F (36.9 °C). Her blood pressure is 132/72 and her pulse is 115 (abnormal). Her respiration is 18 and oxygen saturation is 98%.       A&O x3, NAD.  Lungs CTA, unlabored.  Heart regular, no rub.  Abdomen soft, nontender, no masses.  Allograft nontender.  Edema: none.    Recent Labs   Lab 11/25/22  1051 11/26/22  0727 11/27/22  0702   WBC 10.84 9.26 8.17   HGB 9.3* 9.2* 8.9*   HCT 27.9* 27.3* 26.4*    308 350       Recent Labs   Lab 11/25/22  1051 11/25/22 2038 11/26/22  0727 11/27/22  0702   * 138 142 138   K 5.5* 4.6 3.7 3.8    105 108 110   CO2 17* 14* 21* 17*   BUN 43* 38* 34* 25*   CREATININE 5.2* 4.5* 4.5* 3.4*   CALCIUM 8.4* 7.7* 8.0* 8.0*   PHOS 5.1*  --  4.7* 3.5     Recent Labs   Lab 11/25/22  1051 11/26/22  0727 11/27/22  0702   Tacrolimus Lvl 5.1 10.7 14.0       ASSESSMENT/PLAN:    Problems/plans as noted in the referenced progress note. Additional pertinent findings:  Ckd stage 2  Severe GEORGE due to volume contraction and ?pyelonephritis  Immunosuppression  S/p KP  Acidosis  Anemia of ckd    Continue with broad spectrum antibiotics. I suspect Pyelo and may need empiric therapy to complete 10 days. Will wait for ID recs. Cultures so far No growth  Daily tacro level and renal function panel  Will adjust prograf level per Pharm D recommendation  I spoke with patient about potential causes of her acute illness and GEORGE  All questions " answered             CKD post kidney transplant  -Follow with strict I/Os, daily weights, BP (goal <140/90), daily labs.    -Avoid nephrotoxic agents when feasible (NSAIDs, IV contrast dye, Aminoglycoside-containing antibiotics)  -Renally dose all appropriate medications, including antibiotics      Encounter for Monitoring Immunosuppression post Transplant  -Continue to trend immunosuppression levels.   -Monitor for med-related side effects or drug toxicity given immunosuppressant med with narrow therapeutic window.

## 2022-11-27 NOTE — PLAN OF CARE
VSS,  Standing blood pressures only  No signs of evident distress or complaint of pain.  Pt. Ambulating in room independently.  Non slip socks worn when OOB  Mom at bedside.  Attentive to patient needs  Midline incision with staples.  Edges approximated.  Staples intact.  Right hand 22g PIV dressing CDI  Tele showing NSR  Sodium bicarb infusion discontinued.  NS infusing at 100mL/hr  Vanc added to medication regimen.  See MAR for admin.    C-diff sample sent.  Pt. Negative for c-diff.  Precautions removed.  Imodium admin as per MAR  Bed in lowest locked position.  Call light within reach.  Instructed to call for assistance.  Pt. Expressed understanding.  Educated on plan of care.

## 2022-11-27 NOTE — PLAN OF CARE
VSS,  Standing blood pressures only  No signs of evident distress or complaint of pain.  Pt. Ambulating in room independently.  Non slip socks worn when OOB  Mom at bedside.  Attentive to patient needs  Midline incision with staples.  Edges approximated.  Staples intact.  Right hand 22g PIV dressing CDI  Tele showing NSR  NS infusing at 100mL/hr  Bed in lowest locked position.  Call light within reach.  Instructed to call for assistance.  Pt. Expressed understanding.  Educated on plan of care.

## 2022-11-27 NOTE — PROGRESS NOTES
Pharmacokinetic Assessment Follow Up: IV Vancomycin    Vancomycin serum concentration assessment(s):    The random level was drawn correctly and can be used to guide therapy at this time. The measurement is above the desired definitive target range of 15 to 20 mcg/mL.    Vancomycin Regimen Plan:    Re-dose when the random level is less than 20 mcg/mL, next level to be drawn at AM labs on 11/28/22    Drug levels (last 3 results):  Recent Labs   Lab Result Units 11/26/22  1029 11/27/22  0702   Vancomycin, Random ug/mL 18.8 26.0       Pharmacy will continue to follow and monitor vancomycin.    Please contact pharmacy at extension [phone] for questions regarding this assessment.    Thank you for the consult,   Hitesh Rubalcava       Patient brief summary:  Isabela Read is a 27 y.o. female initiated on antimicrobial therapy with IV Vancomycin for treatment of urinary tract infection    The patient's current regimen is pulse dosing with GEORGE    Drug Allergies:   Review of patient's allergies indicates:  No Known Allergies    Actual Body Weight:   61.3 kg    Renal Function:   Estimated Creatinine Clearance: 21.5 mL/min (A) (based on SCr of 3.4 mg/dL (H)).,     Dialysis Method (if applicable):  N/A    CBC (last 72 hours):  Recent Labs   Lab Result Units 11/25/22  0838 11/25/22  1051 11/26/22  0727 11/27/22  0702   WBC K/uL 11.01 10.84 9.26 8.17   Hemoglobin g/dL 9.8* 9.3* 9.2* 8.9*   Hematocrit % 29.2* 27.9* 27.3* 26.4*   Platelets K/uL 309 336 308 350   Gran % % 92.2* 91.2* 85.8* 82.1*   Lymph % % 2.5* 3.0* 8.5* 11.0*   Mono % % 3.1* 3.3* 2.4* 2.0*   Eosinophil % % 1.8 1.7 2.8 4.3   Basophil % % 0.1 0.2 0.2 0.2   Differential Method  Automated Automated Automated Automated       Metabolic Panel (last 72 hours):  Recent Labs   Lab Result Units 11/25/22  0838 11/25/22  1051 11/25/22  1100 11/25/22 2038 11/26/22  0727 11/27/22  0702   Sodium mmol/L 134* 135*  --  138 142 138   Potassium mmol/L 4.4 5.5*  --  4.6 3.7 3.8    Chloride mmol/L 102 102  --  105 108 110   CO2 mmol/L 20* 17*  --  14* 21* 17*   Glucose mg/dL 106 104  --  125* 101 96   Glucose, UA   --   --  Negative  --   --   --    BUN mg/dL 42* 43*  --  38* 34* 25*   Creatinine mg/dL 5.4* 5.2*  --  4.5* 4.5* 3.4*   Albumin g/dL 3.6 3.7  --   --  3.1* 2.8*   Total Bilirubin mg/dL  --  0.3  --   --   --   --    Alkaline Phosphatase U/L  --  100  --   --   --   --    AST U/L  --  25  --   --   --   --    ALT U/L  --  20  --   --   --   --    Magnesium mg/dL 1.7 1.7  --   --  1.6 1.6   Phosphorus mg/dL 5.0* 5.1*  --   --  4.7* 3.5       Vancomycin Administrations:  vancomycin given in the last 96 hours                     vancomycin in dextrose 5 % 1 gram/250 mL IVPB 1,000 mg (mg) 1,000 mg New Bag 11/26/22 1244    vancomycin 1.25 g in dextrose 5% 250 mL IVPB (ready to mix) (mg) 1,250 mg New Bag 11/25/22 1158                    Microbiologic Results:  Microbiology Results (last 7 days)       Procedure Component Value Units Date/Time    Respiratory Infection Panel (PCR), Nasopharyngeal [[id number]]     Order Status: No result Specimen: Nasopharyngeal Swab     C Diff Toxin by PCR [[id number]] Collected: 11/26/22 0932    Order Status: Completed Updated: 11/26/22 1355     C. diff PCR Negative    Urine culture [[id number]] Collected: 11/25/22 1100    Order Status: Completed Specimen: Urine Updated: 11/26/22 1256     Urine Culture, Routine Multiple organisms isolated. None in predominance.  Repeat if      clinically necessary.    Narrative:      Specimen Source->Urine    Clostridium difficile EIA [[id number]]  (Abnormal) Collected: 11/26/22 0932    Order Status: Completed Specimen: Stool Updated: 11/26/22 1253     C. diff Antigen Positive     C difficile Toxins A+B, EIA Negative     Comment: Testing not recommended for children <24 months old.       Blood culture x two cultures. Draw prior to antibiotics. [[id number]] Collected: 11/25/22 1052    Order Status: Completed Specimen: Blood  from Peripheral, Wrist, Right Updated: 11/26/22 1212     Blood Culture, Routine No Growth to date      No Growth to date    Narrative:      Aerobic and anaerobic    Blood culture x two cultures. Draw prior to antibiotics. [[id number]] Collected: 11/25/22 1052    Order Status: Completed Specimen: Blood from Peripheral, Hand, Right Updated: 11/26/22 1212     Blood Culture, Routine No Growth to date      No Growth to date    Narrative:      Aerobic and anaerobic

## 2022-11-27 NOTE — PROGRESS NOTES
[facility]   Infectious Diseases  Progress Note     Patient Name: Isabela Read   MRN:  [id number]   Admission Date:  2022  Length of Stay:  2  Attending Physician:  Mihai Cook MD   Primary Care Provider:  Tavo Bhatia MD       Isolation Status: No active isolations    Assessment/Plan:      Status post -donor kidney transplantation  28 y/o F h/o DM/HTN nephropathy c/b ESRD s/p SPK 11/3/22 (Thymo induction, CMV D-/R+, tacro/mmf/pred) urinary retention presented to ED  with fevers, malaise, n/v, dysuria, on admit is febrile with GEORGE (Cr 1->4.5). Ucx  without predominant organism but patient subjectively improved with antibiotics.   - plan to treat prolonged course for presumptive pyelo. Continue cefepime while admitted, on discharge can de-escalate to cipro to complete total 14 days of antibiotic therapy (per patient already has Rx for cipro at home - please give additional 7 days of medication).   - can stop vancomycin     C diff antigen positive      - toxin negative, do not need to treat      Anticipated Disposition: per primary      We will sign off at this time. Thank you for your consult. Please contact us if you have any additional questions.     Rita Dash MD  Infectious Diseases  [facility]      Subjective:      Principal Problem: Sepsis    HPI: 28 y/o F h/o DM/HTN nephropathy c/b ESRD s/p SPK 11/3/22 (Thymo induction, CMV D-/R+, tacro/mmf/pred) urinary retention presented to ED  with fevers, malaise, n/v, dysuria, on admit is febrile with GEORGE (Cr 1->4.5).  cultures thus far NGTD.     Interval History: patient walking around room this morning, ambulating without difficulty. Eating well without nausea or vomiting. Says that she feels better since starting ABX, no longer with dysuria. Is having watery diarrhea mostly at night only.     Objective:         Vitals:    22  1214   BP: 132/78   Pulse: (!) 114   Resp:    Temp: 98.1 °F (36.7 °C)        Body mass index is 23.21 kg/m².    Estimated Creatinine Clearance: 21.5 mL/min (A) (based on SCr of 3.4 mg/dL (H)).        Physical Exam  Physical Exam  Vitals reviewed.   Constitutional:       General: She is not in acute distress.     Appearance: Normal appearance. She is not ill-appearing.   HENT:      Head: Normocephalic and atraumatic.      Nose: Nose normal. No congestion.      Mouth/Throat:      Mouth: Mucous membranes are moist.      Pharynx: Oropharynx is clear. No oropharyngeal exudate.   Eyes:      General: No scleral icterus.     Extraocular Movements: Extraocular movements intact.      Pupils: Pupils are equal, round, and reactive to light.   Cardiovascular:      Rate and Rhythm: Normal rate and regular rhythm.      Pulses: Normal pulses.      Heart sounds: No murmur heard.    No friction rub. No gallop.   Pulmonary:      Effort: Pulmonary effort is normal. No respiratory distress.      Breath sounds: Normal breath sounds. No wheezing.   Abdominal:      General: Abdomen is flat. Bowel sounds are normal.      Palpations: Abdomen is soft.      Comments: Incision site without dehiscence.    Musculoskeletal:         General: Normal range of motion.      Cervical back: Normal range of motion and neck supple.      Right lower leg: No edema.      Left lower leg: No edema.   Skin:     General: Skin is warm and dry.      Coloration: Skin is not jaundiced.      Findings: No bruising or lesion.   Neurological:      General: No focal deficit present.      Mental Status: She is alert and oriented to person, place, and time.      Gait: Gait normal.            Labs  Recent Labs   Lab 11/25/22  1051 11/25/22  2038 11/26/22  0727 11/27/22  0702   WBC 10.84  --  9.26 8.17   HGB 9.3*  --  9.2* 8.9*   HCT 27.9*  --  27.3* 26.4*     --  308 350   MCV 95  --  93 93   RDW 15.4*  --  14.7* 14.8*   * 138 142 138   K 5.5* 4.6 3.7 3.8   CL  102 105 108 110   CO2 17* 14* 21* 17*   BUN 43* 38* 34* 25*   CREATININE 5.2* 4.5* 4.5* 3.4*    125* 101 96   PROT 8.0  --   --   --    ALBUMIN 3.7  --  3.1* 2.8*   BILITOT 0.3  --   --   --    AST 25  --   --   --    ALKPHOS 100  --   --   --    ALT 20  --   --   --        Microbiology:  Microbiology Results (last 7 days)       Procedure Component Value Units Date/Time    Blood culture x two cultures. Draw prior to antibiotics. [[id number]] Collected: 11/25/22 1052    Order Status: Completed Specimen: Blood from Peripheral, Wrist, Right Updated: 11/27/22 1212     Blood Culture, Routine No Growth to date      No Growth to date      No Growth to date    Narrative:      Aerobic and anaerobic    Blood culture x two cultures. Draw prior to antibiotics. [[id number]] Collected: 11/25/22 1052    Order Status: Completed Specimen: Blood from Peripheral, Hand, Right Updated: 11/27/22 1212     Blood Culture, Routine No Growth to date      No Growth to date      No Growth to date    Narrative:      Aerobic and anaerobic    Respiratory Infection Panel (PCR), Nasopharyngeal [[id number]]     Order Status: No result Specimen: Nasopharyngeal Swab     C Diff Toxin by PCR [[id number]] Collected: 11/26/22 0932    Order Status: Completed Updated: 11/26/22 1355     C. diff PCR Negative    Urine culture [[id number]] Collected: 11/25/22 1100    Order Status: Completed Specimen: Urine Updated: 11/26/22 1256     Urine Culture, Routine Multiple organisms isolated. None in predominance.  Repeat if      clinically necessary.    Narrative:      Specimen Source->Urine    Clostridium difficile EIA [[id number]]  (Abnormal) Collected: 11/26/22 0932    Order Status: Completed Specimen: Stool Updated: 11/26/22 1253     C. diff Antigen Positive     C difficile Toxins A+B, EIA Negative     Comment: Testing not recommended for children <24 months old.                 Significant Imaging: I have reviewed all pertinent imaging results/findings within the  past 24 hours.

## 2022-11-28 VITALS
BODY MASS INDEX: 23.9 KG/M2 | WEIGHT: 140 LBS | DIASTOLIC BLOOD PRESSURE: 79 MMHG | HEIGHT: 64 IN | OXYGEN SATURATION: 98 % | SYSTOLIC BLOOD PRESSURE: 129 MMHG | HEART RATE: 114 BPM | RESPIRATION RATE: 14 BRPM | TEMPERATURE: 99 F

## 2022-11-28 PROBLEM — E87.20 METABOLIC ACIDOSIS: Status: RESOLVED | Noted: 2021-03-04 | Resolved: 2022-11-28

## 2022-11-28 PROBLEM — E87.5 HYPERKALEMIA: Status: RESOLVED | Noted: 2020-11-07 | Resolved: 2022-11-28

## 2022-11-28 PROBLEM — A41.9 SEPSIS: Status: RESOLVED | Noted: 2022-11-25 | Resolved: 2022-11-28

## 2022-11-28 LAB
ALBUMIN SERPL BCP-MCNC: 3 G/DL (ref 3.5–5.2)
AMYLASE SERPL-CCNC: 53 U/L (ref 20–110)
ANION GAP SERPL CALC-SCNC: 10 MMOL/L (ref 8–16)
BASOPHILS # BLD AUTO: 0.02 K/UL (ref 0–0.2)
BASOPHILS NFR BLD: 0.3 % (ref 0–1.9)
BKV DNA SERPL NAA+PROBE-ACNC: <125 COPIES/ML
BKV DNA SERPL NAA+PROBE-LOG#: <2.1 LOG (10) COPIES/ML
BKV DNA SERPL QL NAA+PROBE: NOT DETECTED
BUN SERPL-MCNC: 21 MG/DL (ref 6–20)
CALCIUM SERPL-MCNC: 9.2 MG/DL (ref 8.7–10.5)
CHLORIDE SERPL-SCNC: 115 MMOL/L (ref 95–110)
CMV DNA SPEC QL NAA+PROBE: NOT DETECTED
CO2 SERPL-SCNC: 18 MMOL/L (ref 23–29)
CREAT SERPL-MCNC: 2.5 MG/DL (ref 0.5–1.4)
CYTOMEGALOVIRUS LOG (IU/ML): NOT DETECTED LOGIU/ML
CYTOMEGALOVIRUS PCR, QUANT: NOT DETECTED IU/ML
DIFFERENTIAL METHOD: ABNORMAL
EOSINOPHIL # BLD AUTO: 0.4 K/UL (ref 0–0.5)
EOSINOPHIL NFR BLD: 5.2 % (ref 0–8)
ERYTHROCYTE [DISTWIDTH] IN BLOOD BY AUTOMATED COUNT: 15.2 % (ref 11.5–14.5)
EST. GFR  (NO RACE VARIABLE): 26.4 ML/MIN/1.73 M^2
GLUCOSE SERPL-MCNC: 79 MG/DL (ref 70–110)
HCT VFR BLD AUTO: 29.9 % (ref 37–48.5)
HGB BLD-MCNC: 9.5 G/DL (ref 12–16)
IMM GRANULOCYTES # BLD AUTO: 0.03 K/UL (ref 0–0.04)
IMM GRANULOCYTES NFR BLD AUTO: 0.4 % (ref 0–0.5)
LIPASE SERPL-CCNC: 11 U/L (ref 4–60)
LYMPHOCYTES # BLD AUTO: 0.7 K/UL (ref 1–4.8)
LYMPHOCYTES NFR BLD: 9.7 % (ref 18–48)
MAGNESIUM SERPL-MCNC: 1.4 MG/DL (ref 1.6–2.6)
MCH RBC QN AUTO: 31 PG (ref 27–31)
MCHC RBC AUTO-ENTMCNC: 31.8 G/DL (ref 32–36)
MCV RBC AUTO: 98 FL (ref 82–98)
MONOCYTES # BLD AUTO: 0.1 K/UL (ref 0.3–1)
MONOCYTES NFR BLD: 0.7 % (ref 4–15)
NEUTROPHILS # BLD AUTO: 6.1 K/UL (ref 1.8–7.7)
NEUTROPHILS NFR BLD: 83.7 % (ref 38–73)
NRBC BLD-RTO: 0 /100 WBC
PHOSPHATE SERPL-MCNC: 3.1 MG/DL (ref 2.7–4.5)
PLATELET # BLD AUTO: 378 K/UL (ref 150–450)
PMV BLD AUTO: 10.5 FL (ref 9.2–12.9)
POTASSIUM SERPL-SCNC: 4.5 MMOL/L (ref 3.5–5.1)
RBC # BLD AUTO: 3.06 M/UL (ref 4–5.4)
SODIUM SERPL-SCNC: 143 MMOL/L (ref 136–145)
TACROLIMUS BLD-MCNC: 5.9 NG/ML (ref 5–15)
VANCOMYCIN SERPL-MCNC: 13.8 UG/ML
WBC # BLD AUTO: 7.3 K/UL (ref 3.9–12.7)

## 2022-11-28 PROCEDURE — 80202 ASSAY OF VANCOMYCIN: CPT | Performed by: INTERNAL MEDICINE

## 2022-11-28 PROCEDURE — 63600175 PHARM REV CODE 636 W HCPCS

## 2022-11-28 PROCEDURE — 36415 COLL VENOUS BLD VENIPUNCTURE: CPT | Performed by: INTERNAL MEDICINE

## 2022-11-28 PROCEDURE — 99239 HOSP IP/OBS DSCHRG MGMT >30: CPT | Mod: 24,,,

## 2022-11-28 PROCEDURE — 93010 ELECTROCARDIOGRAM REPORT: CPT | Mod: ,,, | Performed by: INTERNAL MEDICINE

## 2022-11-28 PROCEDURE — 63600175 PHARM REV CODE 636 W HCPCS: Performed by: PHYSICIAN ASSISTANT

## 2022-11-28 PROCEDURE — 25000003 PHARM REV CODE 250: Performed by: PHYSICIAN ASSISTANT

## 2022-11-28 PROCEDURE — 25000003 PHARM REV CODE 250

## 2022-11-28 PROCEDURE — 83690 ASSAY OF LIPASE: CPT | Performed by: PHYSICIAN ASSISTANT

## 2022-11-28 PROCEDURE — 93005 ELECTROCARDIOGRAM TRACING: CPT

## 2022-11-28 PROCEDURE — 80069 RENAL FUNCTION PANEL: CPT | Performed by: PHYSICIAN ASSISTANT

## 2022-11-28 PROCEDURE — 85025 COMPLETE CBC W/AUTO DIFF WBC: CPT | Performed by: PHYSICIAN ASSISTANT

## 2022-11-28 PROCEDURE — 25000003 PHARM REV CODE 250: Performed by: INTERNAL MEDICINE

## 2022-11-28 PROCEDURE — 82150 ASSAY OF AMYLASE: CPT | Performed by: PHYSICIAN ASSISTANT

## 2022-11-28 PROCEDURE — 80197 ASSAY OF TACROLIMUS: CPT | Performed by: PHYSICIAN ASSISTANT

## 2022-11-28 PROCEDURE — 83735 ASSAY OF MAGNESIUM: CPT | Performed by: PHYSICIAN ASSISTANT

## 2022-11-28 PROCEDURE — 99239 PR HOSPITAL DISCHARGE DAY,>30 MIN: ICD-10-PCS | Mod: 24,,,

## 2022-11-28 PROCEDURE — 93010 EKG 12-LEAD: ICD-10-PCS | Mod: ,,, | Performed by: INTERNAL MEDICINE

## 2022-11-28 RX ORDER — METOPROLOL TARTRATE 25 MG/1
25 TABLET, FILM COATED ORAL 2 TIMES DAILY
Status: DISCONTINUED | OUTPATIENT
Start: 2022-11-28 | End: 2022-11-28 | Stop reason: HOSPADM

## 2022-11-28 RX ORDER — MYCOPHENOLATE MOFETIL 250 MG/1
1000 CAPSULE ORAL 2 TIMES DAILY
Qty: 240 CAPSULE | Refills: 11 | Status: SHIPPED | OUTPATIENT
Start: 2022-11-28 | End: 2022-11-30

## 2022-11-28 RX ORDER — METOPROLOL TARTRATE 25 MG/1
25 TABLET, FILM COATED ORAL 2 TIMES DAILY
Qty: 60 TABLET | Refills: 11 | Status: SHIPPED | OUTPATIENT
Start: 2022-11-28 | End: 2023-01-09

## 2022-11-28 RX ORDER — TACROLIMUS 1 MG/1
CAPSULE ORAL
Qty: 270 CAPSULE | Refills: 11 | Status: SHIPPED | OUTPATIENT
Start: 2022-11-28 | End: 2022-12-01 | Stop reason: DRUGHIGH

## 2022-11-28 RX ORDER — NIFEDIPINE 60 MG/1
60 TABLET, EXTENDED RELEASE ORAL DAILY
Qty: 60 TABLET | Refills: 11 | Status: SHIPPED | OUTPATIENT
Start: 2022-11-28 | End: 2022-11-28 | Stop reason: SDUPTHER

## 2022-11-28 RX ORDER — TACROLIMUS 1 MG/1
4 CAPSULE ORAL 2 TIMES DAILY
Status: DISCONTINUED | OUTPATIENT
Start: 2022-11-28 | End: 2022-11-28 | Stop reason: HOSPADM

## 2022-11-28 RX ORDER — ASPIRIN 81 MG/1
81 TABLET ORAL DAILY
Status: DISCONTINUED | OUTPATIENT
Start: 2022-11-28 | End: 2022-11-28 | Stop reason: HOSPADM

## 2022-11-28 RX ORDER — LANOLIN ALCOHOL/MO/W.PET/CERES
400 CREAM (GRAM) TOPICAL 2 TIMES DAILY
Status: DISCONTINUED | OUTPATIENT
Start: 2022-11-28 | End: 2022-11-28 | Stop reason: HOSPADM

## 2022-11-28 RX ORDER — NIFEDIPINE 30 MG/1
30 TABLET, EXTENDED RELEASE ORAL DAILY
Qty: 30 TABLET | Refills: 11 | Status: SHIPPED | OUTPATIENT
Start: 2022-11-28 | End: 2022-12-30 | Stop reason: ALTCHOICE

## 2022-11-28 RX ORDER — NIFEDIPINE 30 MG/1
60 TABLET, EXTENDED RELEASE ORAL DAILY
Status: DISCONTINUED | OUTPATIENT
Start: 2022-11-29 | End: 2022-11-28 | Stop reason: HOSPADM

## 2022-11-28 RX ORDER — CIPROFLOXACIN 500 MG/1
500 TABLET ORAL EVERY 12 HOURS
Qty: 14 TABLET | Refills: 0 | Status: ON HOLD | OUTPATIENT
Start: 2022-11-28 | End: 2022-12-15 | Stop reason: HOSPADM

## 2022-11-28 RX ORDER — TACROLIMUS 1 MG/1
4 CAPSULE ORAL ONCE
Status: COMPLETED | OUTPATIENT
Start: 2022-11-28 | End: 2022-11-28

## 2022-11-28 RX ORDER — VALGANCICLOVIR 450 MG/1
450 TABLET, FILM COATED ORAL DAILY
Qty: 30 TABLET | Refills: 1 | Status: ON HOLD | OUTPATIENT
Start: 2022-11-28 | End: 2022-12-15 | Stop reason: SDUPTHER

## 2022-11-28 RX ADMIN — MAGNESIUM OXIDE TAB 400 MG (241.3 MG ELEMENTAL MG) 400 MG: 400 (241.3 MG) TAB at 11:11

## 2022-11-28 RX ADMIN — VENLAFAXINE HYDROCHLORIDE 37.5 MG: 37.5 CAPSULE, EXTENDED RELEASE ORAL at 08:11

## 2022-11-28 RX ADMIN — METOPROLOL TARTRATE 25 MG: 25 TABLET, FILM COATED ORAL at 12:11

## 2022-11-28 RX ADMIN — SODIUM BICARBONATE 650 MG TABLET 1300 MG: at 08:11

## 2022-11-28 RX ADMIN — PREDNISONE 5 MG: 5 TABLET ORAL at 08:11

## 2022-11-28 RX ADMIN — TACROLIMUS 4 MG: 1 CAPSULE ORAL at 12:11

## 2022-11-28 RX ADMIN — ASPIRIN 81 MG: 81 TABLET, COATED ORAL at 11:11

## 2022-11-28 RX ADMIN — SODIUM CHLORIDE: 0.9 INJECTION, SOLUTION INTRAVENOUS at 08:11

## 2022-11-28 RX ADMIN — NYSTATIN 500000 UNITS: 500000 SUSPENSION ORAL at 08:11

## 2022-11-28 RX ADMIN — SULFAMETHOXAZOLE AND TRIMETHOPRIM 1 TABLET: 400; 80 TABLET ORAL at 08:11

## 2022-11-28 RX ADMIN — CEFEPIME HYDROCHLORIDE 1 G: 1 INJECTION, SOLUTION INTRAVENOUS at 11:11

## 2022-11-28 NOTE — PROGRESS NOTES
Discharge Medication Note:    Hospital Course:  Ms Read is s/p  transplant from 11/3/22 readmitted for sepsis and GEORGE.  Infectious work up started and ID consulted - sxs thought to be due to pyelo.  Pt treated with cefepime/vanc inpt and will transition back to cipro for an additional 7 days STOP 12/5/22.  MMF on hold - pt to restart after completing abx.  Home BP regimen adjusted due to tachycardia.  Coreg discontinued and pt prescribed metoprolol. Pt also has nifedipine but is to hold if SBP <140.  Home phos stopped at discharge.  GEORGE improving at time of discharge with SCr trending down.     Met with Isabela Read at discharge to review discharge medications and to update the blue medication card.           Medication List        START taking these medications      metoprolol tartrate 25 MG tablet  Commonly known as: LOPRESSOR  Take 1 tablet (25 mg total) by mouth 2 (two) times daily. HOLD SBP <120 or HR <60            CHANGE how you take these medications      ciprofloxacin HCl 500 MG tablet  Commonly known as: CIPRO  Take 1 tablet (500 mg total) by mouth every 12 (twelve) hours. STOP 12/5/22  What changed:   medication strength  how much to take  how to take this  when to take this     mycophenolate 250 mg Cap  Commonly known as: CELLCEPT  Take 4 capsules (1,000 mg total) by mouth 2 (two) times daily. RESTART 12/6/22  What changed: additional instructions     NIFEdipine 30 MG (OSM) 24 hr tablet  Commonly known as: PROCARDIA-XL  Take 1 tablet (30 mg total) by mouth once daily. HOLD FOR SBP <140  What changed:   medication strength  how much to take  when to take this  additional instructions     tacrolimus 1 MG Cap  Commonly known as: PROGRAF  Take 5 capsules (5 mg total) by mouth every morning AND 4 capsules (4 mg total) every evening.  What changed: See the new instructions.     valGANciclovir 450 mg Tab  Commonly known as: VALCYTE  Take 1 tablet (450 mg total) by mouth once daily. Stop  2/2/2023  What changed: how much to take            CONTINUE taking these medications      aspirin 81 MG EC tablet  Commonly known as: ECOTRIN     famotidine 20 MG tablet  Commonly known as: PEPCID  Take 1 tablet (20 mg total) by mouth once daily. Stop 11/27/22 for 19 days     magnesium oxide 400 mg (241.3 mg magnesium) tablet  Commonly known as: MAG-OX  Take 1 tablet (400 mg total) by mouth 2 (two) times daily.     multivitamin per tablet  Commonly known as: THERAGRAN  Take 1 tablet by mouth once daily.     ondansetron 8 MG Tbdl  Commonly known as: ZOFRAN-ODT  DISSOLVE 1 tablet (8 mg total) by mouth every 8 (eight) hours as needed (nausea).     predniSONE 5 MG tablet  Commonly known as: DELTASONE  From 11/6 to 11/12 take 20mg by mouth once daily;  From 11/13 to 11/19 take 15mg daily;  From 11/20 to 11/26 take 10mg daily;  From 11/27/22 start 5mg daily thereafter     rosuvastatin 10 MG tablet  Commonly known as: CRESTOR  Take 1 tablet (10 mg total) by mouth every evening.     sodium bicarbonate 650 MG tablet  Take 2 tablets (1,300 mg total) by mouth 3 (three) times daily.     sulfamethoxazole-trimethoprim 400-80mg 400-80 mg per tablet  Commonly known as: BACTRIM,SEPTRA  Take 1 tablet by mouth once daily. Stop 5/2/2023     venlafaxine 37.5 MG 24 hr capsule  Commonly known as: EFFEXOR XR  Take 1 capsule (37.5 mg total) by mouth once daily.     VITAMIN D2 50,000 unit Cap  Generic drug: ergocalciferol  Take 1 capsule (50,000 Units total) by mouth every 7 days.            STOP taking these medications      carvediloL 25 MG tablet  Commonly known as: COREG     k phos di & mono-sod phos mono 250 mg Tab  Commonly known as: K-PHOS-NEUTRAL     nystatin 100,000 unit/mL suspension  Commonly known as: MYCOSTATIN               Where to Get Your Medications        These medications were sent to [facility] [address], [place] [address]      Hours: Mon-Fri 7a-7p, Sat-Sun 10a-4p Phone: [phone]    ciprofloxacin HCl 500 MG tablet  metoprolol tartrate 25 MG tablet  mycophenolate 250 mg Cap  NIFEdipine 30 MG (OSM) 24 hr tablet  tacrolimus 1 MG Cap  valGANciclovir 450 mg Tab            The following medications have been placed on HOLD and should be restarted in the outpatient setting (when appropriate): mmf (infection - pt to restart 12/6/22)    Isabela Read verbalized understanding and had the opportunity to ask questions.

## 2022-11-28 NOTE — DISCHARGE SUMMARY
[facility] Step[facility]  Kidney Transplant  Discharge Summary    Patient Name: Isabela Read  MRN: [id number]  Admission Date: 11/25/2022  Hospital Length of Stay: 3 days  Discharge Date and Time:  11/29/2022 1:01 PM  Attending Physician: No att. providers found   Discharging Provider: Niurka Vásquez PA-C  Primary Care Provider: Tavo Bhatia MD    HPI:   Ms. Read is a 27 y.o.  female with ESRD secondary to diabetic nephropathy and HTN, now s/p SPK 11/3/22 (Thymo induction, CMV D-/R+). Her post-op course was complicated by urinary retention requiring Moncada replacement, now resolved. Moncada removed 11/8, voiding without difficulty. PVR 50-100ml, patient able to double void. Panc enzymes trended down but Amylase slightly elevated 11/7. Fasting BG normal. Panc US 11/7 was satisfactory. Drain output was 400-700ml/day. Sent for amylase and resulted 204. Drain dc'd 11/8. Patient was tolerating her diet well and was discharged on POD#7 with Cr of 1.0.   She now presents to the ED this morning 11/25 with fever, generalized malaise, n/v, and decreased PO intake X 5 days. Patient with severe GEORGE on outpatient labs done earlier today, Cr up to 5.4. Patient began experiencing dysuria several days ago and UA was done 11/23 which showed +3 leuks, >100 WBC. Ciprofloxacin was prescribed for UTI prophylaxis 11/23 while awaiting UA results. Urine cx from that time with no growth. Panc enzymes from outpt labs WNL. Discussed plan with Dr. Pettit and Dr. Cook. Concern for worsening UTI vs pyelonephritis. Will obtain STAT Kidney and pancreas US, repeat labs, infectious work-up including UA, blood cx, COVID, CMV, BK. IV Cefepime/Vanc and IVF.       Hospital Course:    Afebrile since admission. Infectious w/u NGTD. Kidney US with edema/possible pyelo, ID consulted, dc'd vanc. Will cont PO cipro x 7 days at ID. Pancreas US stable. BK, CMV, COVID negative. No DSAs. After IV abx and fluids,  patient recovered well. Expressed want to be discharged and Cr much improved from admit (now 2.5).     Patient ready and stable for discharge at this time. On day of discharge, patient ambulating without assistance, tolerating diet, pain well controlled. F/u labs . F/u clinic appointment . Patient understands discharge instruction and importance of follow up. Plan to restart MMF when finished with abx.    Goals of Care Treatment Preferences:  Code Status: Full Code      Final Active Diagnoses:    Diagnosis Date Noted POA    Pyelonephritis, acute [N10] 2022 Yes    Immunosuppressive management encounter following kidney transplant [Z79.899, Z94.0] 2022 Not Applicable    Status post pancreas transplantation [Z94.83] 2022 Not Applicable    Acute cystitis without hematuria [N30.00] 2022 Yes    Long-term use of immunosuppressant medication [Z79.60] 2022 Not Applicable    Anemia of chronic disease [D63.8] 2022 Yes    At risk for opportunistic infections [Z91.89] 2022 Yes    Prophylactic immunotherapy [Z29.8] 2022 Not Applicable    Status post -donor kidney transplantation [Z94.0] 2022 Not Applicable    Major depressive disorder, single episode, unspecified [F32.9] 2021 Yes    Hypertension, essential [I10]  Yes    GEORGE (acute kidney injury) [N17.9]  Yes    Anemia due to chronic kidney disease [N18.9, D63.1] 2021 Yes    Renovascular hypertension [I15.0] 2015 Yes      Problems Resolved During this Admission:    Diagnosis Date Noted Date Resolved POA    PRINCIPAL PROBLEM:  Sepsis [A41.9] 2022 Yes    Metabolic acidosis [E87.20] 2021 Yes    Hyperkalemia [E87.5] 2020 Yes       Treatments: see hospital course     Consults (From admission, onward)          Status Ordering Provider     Inpatient consult to Infectious Diseases  Once        Provider:  (Not yet assigned)    Completed MARCO WARNER  C.            Pending Diagnostic Studies:       Procedure Component Value Units Date/Time    HLA Donor Specific Antibodies [[id number]] Collected: 11/26/22 1029    Order Status: Sent Lab Status: In process Updated: 11/28/22 0849    Specimen: Blood           Significant Diagnostic Studies: Labs:   CMP   Recent Labs   Lab 11/28/22  0640      K 4.5   *   CO2 18*   GLU 79   BUN 21*   CREATININE 2.5*   CALCIUM 9.2   ALBUMIN 3.0*   ANIONGAP 10   , CBC   Recent Labs   Lab 11/28/22  0640   WBC 7.30   HGB 9.5*   HCT 29.9*       and All labs within the past 24 hours have been reviewed    Discharged Condition: good    Disposition: Home or Self Care    Patient Instructions:      Diet renal     Notify your health care provider if you experience any of the following:  increased confusion or weakness     Notify your health care provider if you experience any of the following:  persistent dizziness, light-headedness, or visual disturbances     Notify your health care provider if you experience any of the following:  worsening rash     Notify your health care provider if you experience any of the following:  severe persistent headache     Notify your health care provider if you experience any of the following:  difficulty breathing or increased cough     Notify your health care provider if you experience any of the following:  redness, tenderness, or signs of infection (pain, swelling, redness, odor or green/yellow discharge around incision site)     Notify your health care provider if you experience any of the following:  severe uncontrolled pain     Notify your health care provider if you experience any of the following:  persistent nausea and vomiting or diarrhea     Notify your health care provider if you experience any of the following:  temperature >100.4     No dressing needed     Activity as tolerated     Medications:  Reconciled Home Medications:      Medication List        START taking these medications       metoprolol tartrate 25 MG tablet  Commonly known as: LOPRESSOR  Take 1 tablet (25 mg total) by mouth 2 (two) times daily. HOLD SBP <120 or HR <60            CHANGE how you take these medications      ciprofloxacin HCl 500 MG tablet  Commonly known as: CIPRO  Take 1 tablet (500 mg total) by mouth every 12 (twelve) hours. STOP 12/5/22  What changed:   medication strength  how much to take  how to take this  when to take this     mycophenolate 250 mg Cap  Commonly known as: CELLCEPT  Take 4 capsules (1,000 mg total) by mouth 2 (two) times daily. RESTART 12/6/22  What changed: additional instructions     NIFEdipine 30 MG (OSM) 24 hr tablet  Commonly known as: PROCARDIA-XL  Take 1 tablet (30 mg total) by mouth once daily. HOLD FOR SBP <140  What changed:   medication strength  how much to take  when to take this  additional instructions     tacrolimus 1 MG Cap  Commonly known as: PROGRAF  Take 5 capsules (5 mg total) by mouth every morning AND 4 capsules (4 mg total) every evening.  What changed: See the new instructions.     valGANciclovir 450 mg Tab  Commonly known as: VALCYTE  Take 1 tablet (450 mg total) by mouth once daily. Stop 2/2/2023  What changed: how much to take            CONTINUE taking these medications      aspirin 81 MG EC tablet  Commonly known as: ECOTRIN  Take 81 mg by mouth once daily.     famotidine 20 MG tablet  Commonly known as: PEPCID  Take 1 tablet (20 mg total) by mouth once daily. Stop 11/27/22 for 19 days     magnesium oxide 400 mg (241.3 mg magnesium) tablet  Commonly known as: MAG-OX  Take 1 tablet (400 mg total) by mouth 2 (two) times daily.     multivitamin per tablet  Commonly known as: THERAGRAN  Take 1 tablet by mouth once daily.     ondansetron 8 MG Tbdl  Commonly known as: ZOFRAN-ODT  DISSOLVE 1 tablet (8 mg total) by mouth every 8 (eight) hours as needed (nausea).     predniSONE 5 MG tablet  Commonly known as: DELTASONE  From 11/6 to 11/12 take 20mg by mouth once daily;  From  11/13 to 11/19 take 15mg daily;  From 11/20 to 11/26 take 10mg daily;  From 11/27/22 start 5mg daily thereafter     rosuvastatin 10 MG tablet  Commonly known as: CRESTOR  Take 1 tablet (10 mg total) by mouth every evening.     sodium bicarbonate 650 MG tablet  Take 2 tablets (1,300 mg total) by mouth 3 (three) times daily.     sulfamethoxazole-trimethoprim 400-80mg 400-80 mg per tablet  Commonly known as: BACTRIM,SEPTRA  Take 1 tablet by mouth once daily. Stop 5/2/2023     venlafaxine 37.5 MG 24 hr capsule  Commonly known as: EFFEXOR XR  Take 1 capsule (37.5 mg total) by mouth once daily.     VITAMIN D2 50,000 unit Cap  Generic drug: ergocalciferol  Take 1 capsule (50,000 Units total) by mouth every 7 days.            STOP taking these medications      carvediloL 25 MG tablet  Commonly known as: COREG     k phos di & mono-sod phos mono 250 mg Tab  Commonly known as: K-PHOS-NEUTRAL     nystatin 100,000 unit/mL suspension  Commonly known as: MYCOSTATIN            Time spent caring for patient (Greater than 1/2 spent in direct face-to-face contact): > 30 minutes    Niurka Vásquez PA-C  Kidney Transplant  [facility]

## 2022-11-28 NOTE — NURSING
Iv d/bisi with cath tip intact. She met with transplant pharmacist who gave her an updated med card and reviewed her meds with her. AVS reviewed with patient. She verbalized understanding of meds, follow up appts, and what to call for. Waiting on transport.

## 2022-11-28 NOTE — PROGRESS NOTES
Discharge Note:    SW met with pt and mother in room to discuss discharge plan and to assess needs. Pt reports coping adequately at this time and presents as alert and oriented x4 and states is in high spirits.  Pt scheduled to discharge  to pt's home under the care of mother with no needs at this time. SW reviewed discharge plan with pt. Pt aware of and involved in discharge plan. Pt's  mother Chantel [phone] to transport pt.  Pt denies having any concerns at this time as well. Pt verbalized understanding and agreement with information reviewed, social work availability, and how to assess available resources as needed. SW remains available.

## 2022-11-29 LAB
CLASS I ANTIBODY COMMENTS - LUMINEX: NORMAL
CLASS II ANTIBODY COMMENTS - LUMINEX: NORMAL
DSA1 TESTING DATE: NORMAL
DSA12 TESTING DATE: NORMAL
DSA2 TESTING DATE: NORMAL
SERUM COLLECTION DT - LUMINEX CLASS I: NORMAL
SERUM COLLECTION DT - LUMINEX CLASS II: NORMAL

## 2022-11-30 ENCOUNTER — PROCEDURE VISIT (OUTPATIENT)
Dept: UROLOGY | Facility: CLINIC | Age: 27
DRG: 872 | End: 2022-11-30
Payer: MEDICARE

## 2022-11-30 ENCOUNTER — OFFICE VISIT (OUTPATIENT)
Dept: TRANSPLANT | Facility: CLINIC | Age: 27
DRG: 872 | End: 2022-11-30
Payer: MEDICARE

## 2022-11-30 VITALS
WEIGHT: 134.06 LBS | OXYGEN SATURATION: 97 % | HEART RATE: 127 BPM | HEIGHT: 64 IN | DIASTOLIC BLOOD PRESSURE: 61 MMHG | SYSTOLIC BLOOD PRESSURE: 95 MMHG | TEMPERATURE: 98 F | RESPIRATION RATE: 16 BRPM | BODY MASS INDEX: 22.89 KG/M2

## 2022-11-30 VITALS
TEMPERATURE: 97 F | HEIGHT: 64 IN | BODY MASS INDEX: 24.03 KG/M2 | SYSTOLIC BLOOD PRESSURE: 135 MMHG | RESPIRATION RATE: 16 BRPM | DIASTOLIC BLOOD PRESSURE: 82 MMHG

## 2022-11-30 DIAGNOSIS — Z94.83 STATUS POST PANCREAS TRANSPLANTATION: ICD-10-CM

## 2022-11-30 DIAGNOSIS — Z94.83 STATUS POST SIMULTANEOUS KIDNEY AND PANCREAS TRANSPLANT: ICD-10-CM

## 2022-11-30 DIAGNOSIS — Z94.0 STATUS POST SIMULTANEOUS KIDNEY AND PANCREAS TRANSPLANT: ICD-10-CM

## 2022-11-30 DIAGNOSIS — Z79.899 ENCOUNTER FOR LONG-TERM (CURRENT) USE OF OTHER MEDICATIONS: ICD-10-CM

## 2022-11-30 DIAGNOSIS — Z79.60 LONG-TERM USE OF IMMUNOSUPPRESSANT MEDICATION: ICD-10-CM

## 2022-11-30 DIAGNOSIS — Z91.89 AT RISK FOR OPPORTUNISTIC INFECTIONS: ICD-10-CM

## 2022-11-30 DIAGNOSIS — Z94.0 IMMUNOSUPPRESSIVE MANAGEMENT ENCOUNTER FOLLOWING KIDNEY TRANSPLANT: Primary | ICD-10-CM

## 2022-11-30 DIAGNOSIS — Z94.0 KIDNEY REPLACED BY TRANSPLANT: ICD-10-CM

## 2022-11-30 DIAGNOSIS — Z94.83 PANCREAS REPLACED BY TRANSPLANT: ICD-10-CM

## 2022-11-30 DIAGNOSIS — Z94.0 STATUS POST DECEASED-DONOR KIDNEY TRANSPLANTATION: ICD-10-CM

## 2022-11-30 DIAGNOSIS — Z51.81 ENCOUNTER FOR THERAPEUTIC DRUG MONITORING: ICD-10-CM

## 2022-11-30 DIAGNOSIS — Z79.899 IMMUNOSUPPRESSIVE MANAGEMENT ENCOUNTER FOLLOWING KIDNEY TRANSPLANT: Primary | ICD-10-CM

## 2022-11-30 LAB
BACTERIA BLD CULT: NORMAL
BACTERIA BLD CULT: NORMAL

## 2022-11-30 PROCEDURE — 99999 PR PBB SHADOW E&M-EST. PATIENT-LVL IV: ICD-10-PCS | Mod: PBBFAC,,,

## 2022-11-30 PROCEDURE — 52310 CYSTOSCOPY AND TREATMENT: CPT | Mod: S$PBB,,, | Performed by: UROLOGY

## 2022-11-30 PROCEDURE — 52310 PR CYSTOSCOPY,REMV CALCULUS,SIMPLE: ICD-10-PCS | Mod: S$PBB,,, | Performed by: UROLOGY

## 2022-11-30 PROCEDURE — 99214 OFFICE O/P EST MOD 30 MIN: CPT | Mod: PBBFAC

## 2022-11-30 PROCEDURE — 99999 PR PBB SHADOW E&M-EST. PATIENT-LVL IV: CPT | Mod: PBBFAC,,,

## 2022-11-30 PROCEDURE — 52310 CYSTOSCOPY AND TREATMENT: CPT | Mod: PBBFAC | Performed by: UROLOGY

## 2022-11-30 PROCEDURE — 99215 PR OFFICE/OUTPT VISIT, EST, LEVL V, 40-54 MIN: ICD-10-PCS | Mod: 24,S$PBB,, | Performed by: TRANSPLANT SURGERY

## 2022-11-30 PROCEDURE — 87086 URINE CULTURE/COLONY COUNT: CPT | Performed by: UROLOGY

## 2022-11-30 PROCEDURE — 99215 OFFICE O/P EST HI 40 MIN: CPT | Mod: 24,S$PBB,, | Performed by: TRANSPLANT SURGERY

## 2022-11-30 RX ORDER — PROMETHAZINE HYDROCHLORIDE 25 MG/1
25 SUPPOSITORY RECTAL EVERY 6 HOURS PRN
Qty: 10 SUPPOSITORY | Refills: 0 | Status: ON HOLD | OUTPATIENT
Start: 2022-11-30 | End: 2022-12-15 | Stop reason: HOSPADM

## 2022-11-30 RX ORDER — PROMETHAZINE HYDROCHLORIDE 12.5 MG/1
12.5 TABLET ORAL EVERY 6 HOURS PRN
Qty: 30 TABLET | Refills: 1 | Status: SHIPPED | OUTPATIENT
Start: 2022-11-30

## 2022-11-30 RX ORDER — MYCOPHENOLATE MOFETIL 250 MG/1
500 CAPSULE ORAL 2 TIMES DAILY
Qty: 240 CAPSULE | Refills: 11 | Status: ON HOLD | OUTPATIENT
Start: 2022-11-30 | End: 2022-12-15 | Stop reason: HOSPADM

## 2022-11-30 NOTE — PROGRESS NOTES
Transplant Surgery  Kidney/Pancreas Transplant Recipient Follow-up    Referring Physician: Oswald Kruger  Current Nephrologist: Tai Camilo    Subjective:     Chief Complaint: Isabela Read is a 27 y.o. year old Black or  female who is status post Kidney, Pancreas transplant performed on 11/3/2022.    ORGAN:  RIGHT KIDNEY  Disease Etiology: Diabetes Mellitus - Type I  Donor Type:  Donation after Brain Death  Donor CMV Status:  Negative  Donor HBcAB:  Negative  Donor HCV Status:  Negative    History of Present Illness: She reports  nausea .  From a transplant perspective, she reports normal urination.  Isabela is here for management of her immunosuppression medication.  Isabela states that her immunosuppression is being well tolerated.  Hypertension is not present.    External provider notes reviewed: Yes    Review of Systems   Constitutional:  Negative for activity change, appetite change, chills, fatigue and fever.   HENT:  Negative for sore throat and trouble swallowing.    Eyes:  Negative for visual disturbance.   Respiratory:  Negative for cough, chest tightness and shortness of breath.    Cardiovascular:  Negative for chest pain, palpitations and leg swelling.   Gastrointestinal:  Negative for abdominal distention, abdominal pain, blood in stool, constipation, diarrhea, nausea and vomiting.   Endocrine: Negative for polyuria.   Genitourinary:  Negative for decreased urine volume, difficulty urinating, dysuria, flank pain, frequency and hematuria.   Musculoskeletal:  Negative for gait problem, myalgias and neck stiffness.   Skin:  Negative for rash and wound.   Neurological:  Negative for dizziness, tremors, seizures, weakness, light-headedness and headaches.   Hematological:  Negative for adenopathy.   Psychiatric/Behavioral:  Negative for agitation, confusion and sleep disturbance.      Objective:     Physical Exam  Vitals reviewed.   Constitutional:       General: She is not in  acute distress.     Appearance: She is well-developed.   HENT:      Head: Normocephalic.   Eyes:      General: No scleral icterus.     Conjunctiva/sclera: Conjunctivae normal.      Pupils: Pupils are equal, round, and reactive to light.   Neck:      Thyroid: No thyromegaly.      Trachea: No tracheal deviation.   Cardiovascular:      Rate and Rhythm: Normal rate and regular rhythm.      Heart sounds: Normal heart sounds. No murmur heard.  Pulmonary:      Effort: Pulmonary effort is normal. No respiratory distress.      Breath sounds: Normal breath sounds.   Abdominal:      General: Bowel sounds are normal. There is no distension.      Palpations: Abdomen is soft. There is no mass.      Tenderness: There is no abdominal tenderness. There is no guarding or rebound.          Comments: No inguinal adenopathy   Musculoskeletal:         General: Normal range of motion.      Cervical back: Normal range of motion and neck supple.      Comments: No clubbing or cyanosis   Lymphadenopathy:      Cervical: No cervical adenopathy.   Skin:     General: Skin is warm and dry.      Findings: No erythema or rash.   Neurological:      Mental Status: She is alert and oriented to person, place, and time.   Psychiatric:         Behavior: Behavior normal.     Lab Results   Component Value Date    CREATININE 2.5 (H) 11/28/2022    BUN 21 (H) 11/28/2022     Lab Results   Component Value Date    WBC 7.30 11/28/2022    HGB 9.5 (L) 11/28/2022    HCT 29.9 (L) 11/28/2022    HCT 27 (L) 11/03/2022     11/28/2022     Lab Results   Component Value Date    TACROLIMUS 5.9 11/28/2022       Diagnostics:  The following labs have been reviewed: CBC  BMP  TACROLIMUS LEVEL  Amylase  Lipase  The following radiology images have been independently reviewed and interpreted: Renal US  Pancreas ultrasound    Assessment and Plan:        S/P Kidney, Pancreas transplant.  Chronic immunosuppressive medications for rejection prophylaxis at target.  Plan: no  adjustment needed.  Continue monitoring symptoms, labs and drug levels for drug-related toxicity and side effects.  Renal hypertension at target.  Remove staples  Order abdominal series for nausea    Additional testing to be completed according to the Pancreas:Written Order Guideline for Pancreas Transplant Follow-Up (KI-25)    Interpretation of tests and discussion of patient management involves all members of the multidisciplinary transplant team.  Patient advised that it is recommended that all transplant candidates, and their close contacts and household members receive Covid vaccination.  Follow-up: Patient reminded to call with any health changes, since these can be early signs of significant complications.  Also, I advised the patient to be sure any new medications or changes of old medications are discussed with either a pharmacist, or physician knowledgeable with transplant to avoid rejection/drug toxicity related to significant drug interactions.    Palmira Grey MD       [facility] Patient Status  Functional Status: 50% - Requires considerable assistance and frequent medical care  Physical Capacity: No Limitations

## 2022-11-30 NOTE — PROCEDURES
CYSTOSCOPY W/ STENT REMOVAL    Date/Time: 11/30/2022 10:45 AM  Performed by: Cheyenne Ruiz MD  Authorized by: Kumar Rodriges Jr., MD   Comments: Procedure: Flexible cysto-uretheroscopy and stent removal   Pre Procedure Diagnosis:s/p kidney transplant   Post Procedure Diagnosis:same   Surgeon: Cheyenne Ruiz MD   Anesthesia: 2% uro-jet lidocaine jelly for local analgesia   Flexible cysto-urethroscopy was performed after consent was obtained. The risks and benefits were explained.   2% lidocaine urojet was used for local analgesia.   The genitalia was prepped and draped in the sterile fashion with betadine.   The flexible scope was advanced into the urethra and into the bladder. The stent was removed without difficulty.   The patient tolerated the procedure well without complication.   They will follow up with transplant.

## 2022-11-30 NOTE — PROGRESS NOTES
Patient seen in surgery clinic by Dr. Grey. Staples removed by coordinator from mid-line incision. Tolerated well, no complications. Incision healing appropriately and steri-strips applied. Suture removed from RLQ old SINAN site. Patient educated on care for steri-strips, she verbalizes understanding. Patient c/o continued nausea/vomiting and decreased appetite. Dr. Broussard aware and saw patient while in clinic. Per Dr. Broussard patient to get NS infusion outpatient- 750cc this week followed by NS 500cc weekly.

## 2022-12-01 ENCOUNTER — PATIENT MESSAGE (OUTPATIENT)
Dept: TRANSPLANT | Facility: CLINIC | Age: 27
End: 2022-12-01

## 2022-12-01 ENCOUNTER — INFUSION (OUTPATIENT)
Dept: INFECTIOUS DISEASES | Facility: HOSPITAL | Age: 27
DRG: 872 | End: 2022-12-01
Attending: INTERNAL MEDICINE
Payer: MEDICARE

## 2022-12-01 ENCOUNTER — HOSPITAL ENCOUNTER (INPATIENT)
Facility: HOSPITAL | Age: 27
LOS: 13 days | Discharge: HOME | DRG: 872 | End: 2022-12-15
Attending: EMERGENCY MEDICINE | Admitting: INTERNAL MEDICINE
Payer: MEDICARE

## 2022-12-01 VITALS
TEMPERATURE: 97 F | SYSTOLIC BLOOD PRESSURE: 136 MMHG | OXYGEN SATURATION: 100 % | WEIGHT: 135.56 LBS | RESPIRATION RATE: 16 BRPM | HEART RATE: 133 BPM | BODY MASS INDEX: 23.27 KG/M2 | DIASTOLIC BLOOD PRESSURE: 77 MMHG

## 2022-12-01 DIAGNOSIS — A41.81 SEPSIS DUE TO ENTEROCOCCUS: ICD-10-CM

## 2022-12-01 DIAGNOSIS — R19.7 DIARRHEA, UNSPECIFIED TYPE: ICD-10-CM

## 2022-12-01 DIAGNOSIS — N18.4 ANEMIA DUE TO STAGE 4 CHRONIC KIDNEY DISEASE: ICD-10-CM

## 2022-12-01 DIAGNOSIS — N17.9 AKI (ACUTE KIDNEY INJURY): ICD-10-CM

## 2022-12-01 DIAGNOSIS — R33.9 URINARY RETENTION WITH INCOMPLETE BLADDER EMPTYING: ICD-10-CM

## 2022-12-01 DIAGNOSIS — N18.9 ACUTE KIDNEY INJURY SUPERIMPOSED ON CHRONIC KIDNEY DISEASE: ICD-10-CM

## 2022-12-01 DIAGNOSIS — R50.9 FEVER OF UNDETERMINED ORIGIN: ICD-10-CM

## 2022-12-01 DIAGNOSIS — Z79.60 LONG-TERM USE OF IMMUNOSUPPRESSANT MEDICATION: ICD-10-CM

## 2022-12-01 DIAGNOSIS — E83.39 HYPOPHOSPHATEMIA: ICD-10-CM

## 2022-12-01 DIAGNOSIS — D63.1 ANEMIA DUE TO CHRONIC KIDNEY DISEASE, UNSPECIFIED CKD STAGE: ICD-10-CM

## 2022-12-01 DIAGNOSIS — Z94.83 STATUS POST PANCREAS TRANSPLANTATION: Primary | ICD-10-CM

## 2022-12-01 DIAGNOSIS — Z94.0 STATUS POST SIMULTANEOUS KIDNEY AND PANCREAS TRANSPLANT: ICD-10-CM

## 2022-12-01 DIAGNOSIS — R73.9 STEROID-INDUCED HYPERGLYCEMIA: ICD-10-CM

## 2022-12-01 DIAGNOSIS — R65.10 SIRS (SYSTEMIC INFLAMMATORY RESPONSE SYNDROME): Primary | ICD-10-CM

## 2022-12-01 DIAGNOSIS — E10.3521: ICD-10-CM

## 2022-12-01 DIAGNOSIS — N18.6 ANEMIA IN ESRD (END-STAGE RENAL DISEASE): ICD-10-CM

## 2022-12-01 DIAGNOSIS — F41.9 ANXIETY: Chronic | ICD-10-CM

## 2022-12-01 DIAGNOSIS — D64.89 ANEMIA DUE TO OTHER CAUSE, NOT CLASSIFIED: ICD-10-CM

## 2022-12-01 DIAGNOSIS — N17.9 ACUTE KIDNEY INJURY SUPERIMPOSED ON CHRONIC KIDNEY DISEASE: ICD-10-CM

## 2022-12-01 DIAGNOSIS — D63.1 ANEMIA DUE TO STAGE 4 CHRONIC KIDNEY DISEASE: ICD-10-CM

## 2022-12-01 DIAGNOSIS — R00.0 TACHYCARDIA: ICD-10-CM

## 2022-12-01 DIAGNOSIS — R11.14 BILIOUS VOMITING WITH NAUSEA: ICD-10-CM

## 2022-12-01 DIAGNOSIS — F33.41 RECURRENT MAJOR DEPRESSIVE DISORDER, IN PARTIAL REMISSION: ICD-10-CM

## 2022-12-01 DIAGNOSIS — Z91.89 AT RISK FOR OPPORTUNISTIC INFECTIONS: ICD-10-CM

## 2022-12-01 DIAGNOSIS — Z94.83 STATUS POST PANCREAS TRANSPLANTATION: ICD-10-CM

## 2022-12-01 DIAGNOSIS — D50.8 OTHER IRON DEFICIENCY ANEMIA: ICD-10-CM

## 2022-12-01 DIAGNOSIS — Z94.83 STATUS POST SIMULTANEOUS KIDNEY AND PANCREAS TRANSPLANT: ICD-10-CM

## 2022-12-01 DIAGNOSIS — R11.2 NAUSEA & VOMITING: ICD-10-CM

## 2022-12-01 DIAGNOSIS — N18.9 ANEMIA DUE TO CHRONIC KIDNEY DISEASE, UNSPECIFIED CKD STAGE: ICD-10-CM

## 2022-12-01 DIAGNOSIS — I15.0 RENOVASCULAR HYPERTENSION: ICD-10-CM

## 2022-12-01 DIAGNOSIS — Z94.0 KIDNEY REPLACED BY TRANSPLANT: Primary | ICD-10-CM

## 2022-12-01 DIAGNOSIS — T38.0X5A STEROID-INDUCED HYPERGLYCEMIA: ICD-10-CM

## 2022-12-01 DIAGNOSIS — R11.2 NAUSEA AND VOMITING, UNSPECIFIED VOMITING TYPE: ICD-10-CM

## 2022-12-01 DIAGNOSIS — B25.9 CYTOMEGALOVIRUS INFECTION, UNSPECIFIED CYTOMEGALOVIRAL INFECTION TYPE: ICD-10-CM

## 2022-12-01 DIAGNOSIS — Z29.89 PROPHYLACTIC IMMUNOTHERAPY: ICD-10-CM

## 2022-12-01 DIAGNOSIS — R50.81 FEVER IN OTHER DISEASES: ICD-10-CM

## 2022-12-01 DIAGNOSIS — R00.0 SINUS TACHYCARDIA: ICD-10-CM

## 2022-12-01 DIAGNOSIS — R50.9 FEVER, UNSPECIFIED FEVER CAUSE: ICD-10-CM

## 2022-12-01 DIAGNOSIS — Z94.0 STATUS POST DECEASED-DONOR KIDNEY TRANSPLANTATION: ICD-10-CM

## 2022-12-01 DIAGNOSIS — N10 PYELONEPHRITIS, ACUTE: ICD-10-CM

## 2022-12-01 DIAGNOSIS — D63.1 ANEMIA IN ESRD (END-STAGE RENAL DISEASE): ICD-10-CM

## 2022-12-01 DIAGNOSIS — D63.8 ANEMIA IN OTHER CHRONIC DISEASES CLASSIFIED ELSEWHERE: ICD-10-CM

## 2022-12-01 LAB
ALBUMIN SERPL BCP-MCNC: 2.7 G/DL (ref 3.5–5.2)
ALP SERPL-CCNC: 77 U/L (ref 55–135)
ALT SERPL W/O P-5'-P-CCNC: 10 U/L (ref 10–44)
AMYLASE SERPL-CCNC: 46 U/L (ref 20–110)
ANION GAP SERPL CALC-SCNC: 9 MMOL/L (ref 8–16)
AST SERPL-CCNC: 17 U/L (ref 10–40)
BACTERIA #/AREA URNS AUTO: ABNORMAL /HPF
BACTERIA UR CULT: NO GROWTH
BASOPHILS # BLD AUTO: 0.02 K/UL (ref 0–0.2)
BASOPHILS NFR BLD: 0.1 % (ref 0–1.9)
BILIRUB SERPL-MCNC: 0.3 MG/DL (ref 0.1–1)
BILIRUB UR QL STRIP: NEGATIVE
BUN SERPL-MCNC: 20 MG/DL (ref 6–20)
CALCIUM SERPL-MCNC: 9.1 MG/DL (ref 8.7–10.5)
CHLORIDE SERPL-SCNC: 105 MMOL/L (ref 95–110)
CLARITY UR REFRACT.AUTO: ABNORMAL
CO2 SERPL-SCNC: 17 MMOL/L (ref 23–29)
COLOR UR AUTO: YELLOW
CREAT SERPL-MCNC: 3 MG/DL (ref 0.5–1.4)
DIFFERENTIAL METHOD: ABNORMAL
EOSINOPHIL # BLD AUTO: 0.1 K/UL (ref 0–0.5)
EOSINOPHIL NFR BLD: 0.4 % (ref 0–8)
ERYTHROCYTE [DISTWIDTH] IN BLOOD BY AUTOMATED COUNT: 15.2 % (ref 11.5–14.5)
EST. GFR  (NO RACE VARIABLE): 21.2 ML/MIN/1.73 M^2
GLUCOSE SERPL-MCNC: 118 MG/DL (ref 70–110)
GLUCOSE UR QL STRIP: NEGATIVE
HCT VFR BLD AUTO: 22.8 % (ref 37–48.5)
HGB BLD-MCNC: 7.4 G/DL (ref 12–16)
HGB UR QL STRIP: ABNORMAL
HYALINE CASTS UR QL AUTO: 8 /LPF
IMM GRANULOCYTES # BLD AUTO: 0.15 K/UL (ref 0–0.04)
IMM GRANULOCYTES NFR BLD AUTO: 1.1 % (ref 0–0.5)
KETONES UR QL STRIP: NEGATIVE
LACTATE SERPL-SCNC: 1 MMOL/L (ref 0.5–2.2)
LEUKOCYTE ESTERASE UR QL STRIP: ABNORMAL
LIPASE SERPL-CCNC: 8 U/L (ref 4–60)
LYMPHOCYTES # BLD AUTO: 0.3 K/UL (ref 1–4.8)
LYMPHOCYTES NFR BLD: 2.2 % (ref 18–48)
MAGNESIUM SERPL-MCNC: 1.6 MG/DL (ref 1.6–2.6)
MCH RBC QN AUTO: 31.4 PG (ref 27–31)
MCHC RBC AUTO-ENTMCNC: 32.5 G/DL (ref 32–36)
MCV RBC AUTO: 97 FL (ref 82–98)
MICROSCOPIC COMMENT: ABNORMAL
MONOCYTES # BLD AUTO: 0.3 K/UL (ref 0.3–1)
MONOCYTES NFR BLD: 1.9 % (ref 4–15)
NEUTROPHILS # BLD AUTO: 13.4 K/UL (ref 1.8–7.7)
NEUTROPHILS NFR BLD: 94.3 % (ref 38–73)
NITRITE UR QL STRIP: NEGATIVE
NRBC BLD-RTO: 0 /100 WBC
PH UR STRIP: 7 [PH] (ref 5–8)
PHOSPHATE SERPL-MCNC: 1.9 MG/DL (ref 2.7–4.5)
PLATELET # BLD AUTO: 414 K/UL (ref 150–450)
PMV BLD AUTO: 9.7 FL (ref 9.2–12.9)
POTASSIUM SERPL-SCNC: 4.6 MMOL/L (ref 3.5–5.1)
PROT SERPL-MCNC: 6.7 G/DL (ref 6–8.4)
PROT UR QL STRIP: ABNORMAL
RBC # BLD AUTO: 2.36 M/UL (ref 4–5.4)
RBC #/AREA URNS AUTO: 48 /HPF (ref 0–4)
SARS-COV-2 RDRP RESP QL NAA+PROBE: NEGATIVE
SODIUM SERPL-SCNC: 131 MMOL/L (ref 136–145)
SP GR UR STRIP: 1.01 (ref 1–1.03)
SQUAMOUS #/AREA URNS AUTO: 3 /HPF
URN SPEC COLLECT METH UR: ABNORMAL
WBC # BLD AUTO: 14.22 K/UL (ref 3.9–12.7)
WBC #/AREA URNS AUTO: 11 /HPF (ref 0–5)

## 2022-12-01 PROCEDURE — 25000003 PHARM REV CODE 250: Performed by: INTERNAL MEDICINE

## 2022-12-01 PROCEDURE — 99205 OFFICE O/P NEW HI 60 MIN: CPT | Mod: ,,, | Performed by: PHYSICIAN ASSISTANT

## 2022-12-01 PROCEDURE — 25000003 PHARM REV CODE 250: Performed by: PHYSICIAN ASSISTANT

## 2022-12-01 PROCEDURE — 63600175 PHARM REV CODE 636 W HCPCS: Performed by: INTERNAL MEDICINE

## 2022-12-01 PROCEDURE — 84100 ASSAY OF PHOSPHORUS: CPT | Performed by: PHYSICIAN ASSISTANT

## 2022-12-01 PROCEDURE — 63600175 PHARM REV CODE 636 W HCPCS: Performed by: EMERGENCY MEDICINE

## 2022-12-01 PROCEDURE — 63600175 PHARM REV CODE 636 W HCPCS: Performed by: PHYSICIAN ASSISTANT

## 2022-12-01 PROCEDURE — 87186 SC STD MICRODIL/AGAR DIL: CPT | Performed by: EMERGENCY MEDICINE

## 2022-12-01 PROCEDURE — 87040 BLOOD CULTURE FOR BACTERIA: CPT | Mod: 59 | Performed by: PHYSICIAN ASSISTANT

## 2022-12-01 PROCEDURE — U0002 COVID-19 LAB TEST NON-CDC: HCPCS | Performed by: PHYSICIAN ASSISTANT

## 2022-12-01 PROCEDURE — G0378 HOSPITAL OBSERVATION PER HR: HCPCS

## 2022-12-01 PROCEDURE — 93005 ELECTROCARDIOGRAM TRACING: CPT

## 2022-12-01 PROCEDURE — 87088 URINE BACTERIA CULTURE: CPT | Performed by: EMERGENCY MEDICINE

## 2022-12-01 PROCEDURE — 87077 CULTURE AEROBIC IDENTIFY: CPT | Performed by: EMERGENCY MEDICINE

## 2022-12-01 PROCEDURE — 83735 ASSAY OF MAGNESIUM: CPT | Performed by: PHYSICIAN ASSISTANT

## 2022-12-01 PROCEDURE — 81001 URINALYSIS AUTO W/SCOPE: CPT | Performed by: EMERGENCY MEDICINE

## 2022-12-01 PROCEDURE — 96360 HYDRATION IV INFUSION INIT: CPT

## 2022-12-01 PROCEDURE — 93010 EKG 12-LEAD: ICD-10-PCS | Mod: ,,, | Performed by: INTERNAL MEDICINE

## 2022-12-01 PROCEDURE — 82150 ASSAY OF AMYLASE: CPT | Performed by: PHYSICIAN ASSISTANT

## 2022-12-01 PROCEDURE — 99205 PR OFFICE/OUTPT VISIT, NEW, LEVL V, 60-74 MIN: ICD-10-PCS | Mod: ,,, | Performed by: PHYSICIAN ASSISTANT

## 2022-12-01 PROCEDURE — 83690 ASSAY OF LIPASE: CPT | Performed by: PHYSICIAN ASSISTANT

## 2022-12-01 PROCEDURE — 96375 TX/PRO/DX INJ NEW DRUG ADDON: CPT

## 2022-12-01 PROCEDURE — 99285 PR EMERGENCY DEPT VISIT,LEVEL V: ICD-10-PCS | Mod: CS,,, | Performed by: EMERGENCY MEDICINE

## 2022-12-01 PROCEDURE — 93010 ELECTROCARDIOGRAM REPORT: CPT | Mod: ,,, | Performed by: INTERNAL MEDICINE

## 2022-12-01 PROCEDURE — 80053 COMPREHEN METABOLIC PANEL: CPT | Performed by: PHYSICIAN ASSISTANT

## 2022-12-01 PROCEDURE — 63600175 PHARM REV CODE 636 W HCPCS

## 2022-12-01 PROCEDURE — 99285 EMERGENCY DEPT VISIT HI MDM: CPT | Mod: 25

## 2022-12-01 PROCEDURE — 83605 ASSAY OF LACTIC ACID: CPT | Performed by: PHYSICIAN ASSISTANT

## 2022-12-01 PROCEDURE — 99285 EMERGENCY DEPT VISIT HI MDM: CPT | Mod: CS,,, | Performed by: EMERGENCY MEDICINE

## 2022-12-01 PROCEDURE — 87086 URINE CULTURE/COLONY COUNT: CPT | Performed by: EMERGENCY MEDICINE

## 2022-12-01 PROCEDURE — 85025 COMPLETE CBC W/AUTO DIFF WBC: CPT | Performed by: PHYSICIAN ASSISTANT

## 2022-12-01 RX ORDER — ONDANSETRON 2 MG/ML
4 INJECTION INTRAMUSCULAR; INTRAVENOUS EVERY 6 HOURS PRN
Status: DISCONTINUED | OUTPATIENT
Start: 2022-12-01 | End: 2022-12-15 | Stop reason: HOSPADM

## 2022-12-01 RX ORDER — TRAMADOL HYDROCHLORIDE 50 MG/1
50 TABLET ORAL EVERY 6 HOURS PRN
Status: DISCONTINUED | OUTPATIENT
Start: 2022-12-02 | End: 2022-12-08

## 2022-12-01 RX ORDER — VENLAFAXINE HYDROCHLORIDE 37.5 MG/1
37.5 CAPSULE, EXTENDED RELEASE ORAL DAILY
Status: DISCONTINUED | OUTPATIENT
Start: 2022-12-02 | End: 2022-12-15 | Stop reason: HOSPADM

## 2022-12-01 RX ORDER — SODIUM CHLORIDE 9 MG/ML
INJECTION, SOLUTION INTRAVENOUS CONTINUOUS
Status: DISCONTINUED | OUTPATIENT
Start: 2022-12-01 | End: 2022-12-04

## 2022-12-01 RX ORDER — CEFEPIME HYDROCHLORIDE 1 G/50ML
1 INJECTION, SOLUTION INTRAVENOUS
Status: DISCONTINUED | OUTPATIENT
Start: 2022-12-01 | End: 2022-12-03

## 2022-12-01 RX ORDER — TACROLIMUS 1 MG/1
6 CAPSULE ORAL 2 TIMES DAILY
Status: DISCONTINUED | OUTPATIENT
Start: 2022-12-01 | End: 2022-12-03

## 2022-12-01 RX ORDER — ONDANSETRON 2 MG/ML
INJECTION INTRAMUSCULAR; INTRAVENOUS
Status: COMPLETED
Start: 2022-12-01 | End: 2022-12-01

## 2022-12-01 RX ORDER — ASPIRIN 81 MG/1
81 TABLET ORAL DAILY
Status: DISCONTINUED | OUTPATIENT
Start: 2022-12-02 | End: 2022-12-04

## 2022-12-01 RX ORDER — ACETAMINOPHEN 325 MG/1
650 TABLET ORAL
Status: DISCONTINUED | OUTPATIENT
Start: 2022-12-01 | End: 2022-12-01 | Stop reason: HOSPADM

## 2022-12-01 RX ORDER — ATORVASTATIN CALCIUM 40 MG/1
40 TABLET, FILM COATED ORAL DAILY
Status: DISCONTINUED | OUTPATIENT
Start: 2022-12-02 | End: 2022-12-15 | Stop reason: HOSPADM

## 2022-12-01 RX ORDER — HEPARIN SODIUM 5000 [USP'U]/ML
5000 INJECTION, SOLUTION INTRAVENOUS; SUBCUTANEOUS EVERY 8 HOURS
Status: DISCONTINUED | OUTPATIENT
Start: 2022-12-01 | End: 2022-12-15 | Stop reason: HOSPADM

## 2022-12-01 RX ORDER — PREDNISONE 5 MG/1
5 TABLET ORAL DAILY
Status: DISCONTINUED | OUTPATIENT
Start: 2022-12-02 | End: 2022-12-15 | Stop reason: HOSPADM

## 2022-12-01 RX ORDER — SODIUM CHLORIDE 0.9 % (FLUSH) 0.9 %
10 SYRINGE (ML) INJECTION
Status: DISCONTINUED | OUTPATIENT
Start: 2022-12-01 | End: 2022-12-15 | Stop reason: HOSPADM

## 2022-12-01 RX ORDER — VALGANCICLOVIR 450 MG/1
450 TABLET, FILM COATED ORAL DAILY
Status: DISCONTINUED | OUTPATIENT
Start: 2022-12-02 | End: 2022-12-02

## 2022-12-01 RX ORDER — TACROLIMUS 1 MG/1
CAPSULE ORAL
Qty: 270 CAPSULE | Refills: 11 | Status: ON HOLD | OUTPATIENT
Start: 2022-12-01 | End: 2022-12-15 | Stop reason: SDUPTHER

## 2022-12-01 RX ORDER — CIPROFLOXACIN 500 MG/1
500 TABLET ORAL EVERY 12 HOURS
Status: DISCONTINUED | OUTPATIENT
Start: 2022-12-01 | End: 2022-12-01

## 2022-12-01 RX ORDER — SODIUM BICARBONATE 650 MG/1
1300 TABLET ORAL 3 TIMES DAILY
Status: DISCONTINUED | OUTPATIENT
Start: 2022-12-01 | End: 2022-12-04

## 2022-12-01 RX ORDER — SULFAMETHOXAZOLE AND TRIMETHOPRIM 400; 80 MG/1; MG/1
1 TABLET ORAL DAILY
Status: DISCONTINUED | OUTPATIENT
Start: 2022-12-02 | End: 2022-12-02

## 2022-12-01 RX ORDER — ACETAMINOPHEN 325 MG/1
650 TABLET ORAL EVERY 8 HOURS PRN
Status: DISCONTINUED | OUTPATIENT
Start: 2022-12-01 | End: 2022-12-08

## 2022-12-01 RX ORDER — PROMETHAZINE HYDROCHLORIDE 12.5 MG/1
12.5 TABLET ORAL EVERY 6 HOURS PRN
Status: DISCONTINUED | OUTPATIENT
Start: 2022-12-01 | End: 2022-12-04

## 2022-12-01 RX ADMIN — ONDANSETRON 4 MG: 2 INJECTION INTRAMUSCULAR; INTRAVENOUS at 10:12

## 2022-12-01 RX ADMIN — SODIUM CHLORIDE, POTASSIUM CHLORIDE, SODIUM LACTATE AND CALCIUM CHLORIDE 1000 ML: 600; 310; 30; 20 INJECTION, SOLUTION INTRAVENOUS at 03:12

## 2022-12-01 RX ADMIN — TACROLIMUS 6 MG: 5 CAPSULE ORAL at 05:12

## 2022-12-01 RX ADMIN — TRAMADOL HYDROCHLORIDE 50 MG: 50 TABLET, COATED ORAL at 11:12

## 2022-12-01 RX ADMIN — SODIUM CHLORIDE 750 ML: 0.9 INJECTION, SOLUTION INTRAVENOUS at 01:12

## 2022-12-01 RX ADMIN — SODIUM PHOSPHATE, MONOBASIC, MONOHYDRATE AND SODIUM PHOSPHATE, DIBASIC, ANHYDROUS 30 MMOL: 142; 276 INJECTION, SOLUTION INTRAVENOUS at 10:12

## 2022-12-01 RX ADMIN — ACETAMINOPHEN 650 MG: 325 TABLET ORAL at 02:12

## 2022-12-01 RX ADMIN — CEFEPIME HYDROCHLORIDE 1 G: 1 INJECTION, POWDER, FOR SOLUTION INTRAMUSCULAR; INTRAVENOUS at 05:12

## 2022-12-01 RX ADMIN — SODIUM CHLORIDE: 0.9 INJECTION, SOLUTION INTRAVENOUS at 05:12

## 2022-12-01 RX ADMIN — ONDANSETRON 8 MG: 2 INJECTION INTRAMUSCULAR; INTRAVENOUS at 02:12

## 2022-12-01 RX ADMIN — VANCOMYCIN HYDROCHLORIDE 1250 MG: 1.25 INJECTION, POWDER, LYOPHILIZED, FOR SOLUTION INTRAVENOUS at 08:12

## 2022-12-01 RX ADMIN — SODIUM BICARBONATE 650 MG TABLET 1300 MG: at 08:12

## 2022-12-01 NOTE — ED TRIAGE NOTES
Pt states she has had loss of appetite times one week  Pt was admitted last week and discharged two days ago with loss of appetite

## 2022-12-01 NOTE — TELEPHONE ENCOUNTER
Voicemail left for patient as well as message sent to patient via [facility] with medication adjustment.    ----- Message from Tammie Broussard DO sent at 12/1/2022  1:18 PM CST -----  Very low FK for K/P off of MMF.  Increase FK to 6 BID starting tonight

## 2022-12-01 NOTE — PROGRESS NOTES
"Pt in clinic for IVFs for hydration; pt states she has no pain today, but is "feeling weak and cannot eat";       "

## 2022-12-01 NOTE — ASSESSMENT & PLAN NOTE
- Amylase/lipase WNL on AM labs. Repeat ordered on admission  - Pancreas US 11/25 satisfactory  - Monitor with daily labs

## 2022-12-01 NOTE — HPI
Ms. Read is a 27 y.o.  female with ESRD secondary to diabetic nephropathy and HTN, now s/p SPK 11/3/22 (Thymo induction, CMV D-/R+). Her post-op course was complicated by urinary retention requiring Moncada replacement, now resolved. She was recently admitted to the hospital and discharged 11/29/22 after presenting with nausea/vomiting, GEORGE, and had fever. Infectious work up performed. Urine cx had +multiple organisms none in predominance. Kidney US with edema/possible pyelo, ID consulted. She was initially treated with broad spectrum antibiotics. She was discharged on PO cipro x 7 days at [facility]. She now presents to the ED with complaints of ongoing nausea/vomiting, decreased appetite, intermittent abdominal pain. She had ureteral stent removed outpatient yesterday 11/30/22. She was in infusion center today to give IVF but had episode of vomiting so she was sent to the ED. She denies dysuria/urgency. She denies history of gastroparesis (has had gastric emptying study in 2020 that was normal). In ED, she is noted to be tachycardic in the 130s, blood pressure 90s, along with low grade fever 100.4. EKG shows sinus tachycardia. Creatinine remains elevated above baseline. She is receiving 1L LR bolus in ED. Plan for repeat infectious work up, IVF hydration, IV antibiotics, kidney US, and CT A/P. Will also check CMV PCR.

## 2022-12-01 NOTE — ED PROVIDER NOTES
Encounter Date: 12/1/2022       History     Chief Complaint   Patient presents with    Vomiting    Abdominal Pain     Was in infusion suite getting IV fluids and started vomiting.  Received 8mg zofran IV and about 200cc fluids; kidney/pancreas transplant 11-3-22     28 yo W with extensive pmhx including ESRD 2/2 diabetic nephropathy and HTP s/p kidney/pancreas tx (11/3/22), recent hospitalization for pyelonephritis discharged on Cipro presents from infusion clinic with nausea and vomiting.  Patient reports that since discharge for the past week, she is had decreased appetite and is not eating much.  This morning she only had some of an apple and then took all her medications.  While in the infusion suite she received approximately 200 cc of fluid and then developed abdominal cramping and vomiting.  She received 8 mg of Zofran with resolution of nausea. Abdominal cramping also resolved.  She reports feeling improved with that regards but her fatigue and decreased appetite and going on a week.  She reports a similar episode in the past occurred after not eating in the morning and she is suspicious that that is what caused this nausea.  She feels that she is maintaining adequate urine output.  She denies any dysuria or fevers.  Abdominal pain.  She had outpatient labs earlier today that revealed a white blood cell count of 11.  Stable hemoglobin at 9.5.  CMP revealed a creatinine of 2.8 which is slightly worsened from 2 days ago which was 2.5.  BUN is normal at 19.    Review of patient's allergies indicates:  No Known Allergies  Past Medical History:   Diagnosis Date    Acute kidney injury superimposed on chronic kidney disease 4/7/2021    Anemia     Chronic hypertension with exacerbation during pregnancy in second trimester 11/6/2020    Current regimen (11/6/20):  - Carvedilol 12.5 mg BID - Nifedipine 30 mg daily Baseline CKD + proteinuria    Chronic kidney disease     Depression     Diabetes mellitus     Diabetic  retinopathy     Diarrhea     Encounter for blood transfusion     End stage renal failure on dialysis     Gastroparesis     Glaucoma     Hepatomegaly 4/29/2021    Hx of psychiatric care     Hyperlipidemia     Hypertension     Nephrotic syndrome     Nephrotic syndrome 3/4/2021    Nonspecific reaction to tuberculin skin test without active tuberculosis 10/1/2021    Palpitations     Poor fetal growth affecting management of mother in second trimester 10/15/2020    Restrictive lung disease     Retinal detachment     Severe pre-eclampsia in second trimester 11/6/2020    Type 1 diabetes mellitus with end-stage renal disease (ESRD) 2/24/2015    Followed by Dr. Mayo.  Last A1C was 13  Currently on lantus 20 units qAM and humolog 10 units with meals  - a fetal echocardiogram around 22-24 weeks - needs eye exam, podiatry exam  - needs EKG, maternal echo and fu with cardiology  - ASA 81mg, folic acid 4mg PO daily - hemoglobin A1c every 4-6 weeks -24 hour urine for protein and creatinine clearance should be performed. Patient will also need a     Past Surgical History:   Procedure Laterality Date    AV FISTULA PLACEMENT Left 04/07/2021    Procedure: CREATION, AV FISTULA;  Surgeon: Roberto Ryan MD;  Location: [facility] OR [address] [place] [address];  Service: Peripheral Vascular;  Laterality: Left;    COLONOSCOPY N/A 03/16/2022    Procedure: COLONOSCOPY;  Surgeon: Tavo Kwok MD;  [facility]  Service: Endoscopy;  Laterality: N/A;  Questionable history of delayed gastric emptying longstanding diabetes now on eating pre transplant workup for history of nausea vomiting which seems to have improved with dialysis also chronic diarrhea and history of anemia pre transplant workup for kidney transplant. 3    CYSTOSCOPY      ESOPHAGOGASTRODUODENOSCOPY N/A 03/16/2022    Procedure: EGD (ESOPHAGOGASTRODUODENOSCOPY);  Surgeon: Tavo Kwok MD;  Location: [facility] SEGUN (4TH FLR);  Service: Endoscopy;  Laterality: N/A;   Questionable history of delayed gastric emptying longstanding diabetes now on eating pre transplant workup for history of nausea vomiting which seems to have improved with dialysis also chronic diarrhea and history of anemia pre transplant workup for    FISTULOGRAM N/A 08/11/2021    Procedure: Fistulogram;  Surgeon: Roberto Ryan MD;  Location: [facility] CATH LAB;  Service: Cardiology;  Laterality: N/A;    KIDNEY TRANSPLANT Right 11/02/2022    Procedure: TRANSPLANT, KIDNEY;  Surgeon: Kumar Rodriges Jr., MD;  Location: [facility] OR 2ND FLR;  Service: Transplant;  Laterality: Right;    LASER PHOTOCOAGULATION OF RETINA Right 05/31/2022    Procedure: PHOTOCOAGULATION, RETINA, USING LASER;  Surgeon: Maximilian Montaño MD;  Location: [facility] OR 1ST FLR;  Service: Ophthalmology;  Laterality: Right;    PERCUTANEOUS TRANSLUMINAL ANGIOPLASTY OF ARTERIOVENOUS FISTULA N/A 08/11/2021    Procedure: PTA, AV FISTULA;  Surgeon: Roberto Ryan MD;  Location: [facility] CATH LAB;  Service: Cardiology;  Laterality: N/A;    REMOVAL IMPLANT, POSTERIOR SEGMENT, INTRAOCULAR Right 02/01/2022    Procedure: REMOVAL IMPLANT, POSTERIOR SEGMENT, INTRAOCULAR;  Surgeon: Maximilian Montaño MD;  [facility];  Service: Ophthalmology;  Laterality: Right;    REPAIR OF RETINAL DETACHMENT WITH VITRECTOMY Right 01/25/2022    Procedure: REPAIR, RETINAL DETACHMENT, WITH VITRECTOMY, MEMBRANE PEEL, LASER, INJECTION OF GAS VS OIL;  Surgeon: Maximilian Montaño MD;  Location: [facility] OR 1ST FLR;  Service: Ophthalmology;  Laterality: Right;    REVISION OF ARTERIOVENOUS FISTULA Left 12/10/2021    Procedure: REVISION, AV FISTULA with BVT;  Surgeon: Roberto Ryan MD;  Location: [facility] OR 2ND FLR;  Service: Peripheral Vascular;  Laterality: Left;    TRANSPLANTATION OF PANCREAS N/A 11/02/2022    Procedure: TRANSPLANT, PANCREAS;  Surgeon: Kumar Rodriges Jr., MD;  Location: [facility] OR 2ND FLR;  Service: Transplant;  Laterality: N/A;    VITRECTOMY BY  PARS PLANA APPROACH Right 02/01/2022    Procedure: VITRECTOMY, PARS PLANA APPROACH;  Surgeon: Maximilian Montaño MD;  Location: [facility] OR [address] [place] [address];  Service: Ophthalmology;  Laterality: Right;    VITRECTOMY BY PARS PLANA APPROACH Right 05/31/2022    Procedure: VITRECTOMY, PARS PLANA APPROACH;  Surgeon: Maximilian Montaño MD;  [facility];  Service: Ophthalmology;  Laterality: Right;     Family History   Problem Relation Age of Onset    Hypertension Mother     Heart disease Father     Diabetes Brother     Celiac disease Neg Hx     Cirrhosis Neg Hx     Colon cancer Neg Hx     Colon polyps Neg Hx     Crohn's disease Neg Hx     Inflammatory bowel disease Neg Hx     Liver cancer Neg Hx     Liver disease Neg Hx     Rectal cancer Neg Hx     Stomach cancer Neg Hx     Ulcerative colitis Neg Hx     Esophageal cancer Neg Hx     Hemochromatosis Neg Hx     Pancreatic cancer Neg Hx     Kidney cancer Neg Hx     Bladder Cancer Neg Hx     Uterine cancer Neg Hx     Ovarian cancer Neg Hx      Social History     Tobacco Use    Smoking status: Never    Smokeless tobacco: Never   Substance Use Topics    Alcohol use: No    Drug use: No     Review of Systems   Constitutional:  Positive for fatigue. Negative for fever.   HENT:  Negative for sore throat.    Respiratory:  Negative for shortness of breath.    Cardiovascular:  Negative for chest pain.   Gastrointestinal:  Positive for abdominal pain (Resolved) and nausea (Resolved).   Genitourinary:  Negative for dysuria.   Musculoskeletal:  Negative for back pain.   Skin:  Negative for rash.   Neurological:  Negative for weakness.   Hematological:  Does not bruise/bleed easily.     Physical Exam     Initial Vitals [12/01/22 1440]   BP Pulse Resp Temp SpO2   (!) 99/47 (!) 130 20 98 °F (36.7 °C) 99 %      MAP       --         Physical Exam    Nursing note and vitals reviewed.  Constitutional: She appears well-developed and well-nourished. She is not diaphoretic. No distress.    Fatigued   HENT:   Head: Normocephalic and atraumatic.   Eyes: Right eye exhibits no discharge. Left eye exhibits no discharge. No scleral icterus.   Neck: Neck supple. No JVD present.   Normal range of motion.  Cardiovascular:  Regular rhythm and normal heart sounds.   Tachycardia present.   Exam reveals no gallop and no friction rub.       No murmur heard.  Tachycardic, but not at 130 as documented in triage   Pulmonary/Chest: Breath sounds normal. No respiratory distress. She has no wheezes. She has no rhonchi. She has no rales. She exhibits no tenderness.   Abdominal: Abdomen is soft. Bowel sounds are normal. She exhibits no distension and no mass. There is no abdominal tenderness.   Laparotomy incision with Steri-Strips in place is intact, no appreciable abdominal tenderness There is no rebound and no guarding.   Musculoskeletal:         General: No tenderness or edema. Normal range of motion.      Cervical back: Normal range of motion and neck supple.     Neurological: She is alert and oriented to person, place, and time. She has normal strength. No sensory deficit.   Skin: Skin is warm and dry. Capillary refill takes less than 2 seconds.   Psychiatric: Thought content normal.       ED Course   Procedures  Labs Reviewed   CULTURE, BLOOD   CULTURE, BLOOD   CULTURE, URINE   URINALYSIS, REFLEX TO URINE CULTURE   SARS-COV-2 RNA AMPLIFICATION, QUAL   URINALYSIS   CMV DNA, QUANTITATIVE, PCR   COMPREHENSIVE METABOLIC PANEL   LACTIC ACID, PLASMA   CBC W/ AUTO DIFFERENTIAL   PHOSPHORUS   MAGNESIUM   AMYLASE   LIPASE     EKG Readings: (Independently Interpreted)   Initial Reading: No STEMI. Rhythm: Sinus Tachycardia. Heart Rate: 125. ST Segments: Normal ST Segments. T Waves: Normal. Axis: Normal.   ECG Results              EKG 12-lead (Final result)  Result time 12/01/22 16:40:13      Final result by Interface, Lab In [facility] (12/01/22 16:40:13)                   Narrative:    Test Reason : R00.0,    Vent. Rate : 125  BPM     Atrial Rate : 125 BPM     P-R Int : 128 ms          QRS Dur : 074 ms      QT Int : 320 ms       P-R-T Axes : 012 045 043 degrees     QTc Int : 461 ms    Sinus tachycardia  Otherwise normal ECG  When compared with ECG of 28-NOV-2022 10:12,  No significant change was found  Confirmed by Sohail SANZ MD ([id number]) on 12/1/2022 4:40:09 PM    Referred By: [email or web address]   SELF           Confirmed By:Shoail SANZ MD                                  Imaging Results              CT Abdomen Pelvis  Without Contrast (In process)                      Medications   lactated ringers bolus 1,000 mL (1,000 mLs Intravenous New Bag 12/1/22 2912)   aspirin EC tablet 81 mg (has no administration in time range)   predniSONE tablet 5 mg (has no administration in time range)   promethazine tablet 12.5 mg (has no administration in time range)   atorvastatin tablet 40 mg (has no administration in time range)   sodium bicarbonate tablet 1,300 mg (has no administration in time range)   sulfamethoxazole-trimethoprim 400-80mg per tablet 1 tablet (has no administration in time range)   tacrolimus capsule 6 mg (has no administration in time range)   valGANciclovir tablet 450 mg (has no administration in time range)   venlafaxine 24 hr capsule 37.5 mg (has no administration in time range)   ondansetron injection 4 mg (has no administration in time range)   sodium chloride 0.9% flush 10 mL (has no administration in time range)   acetaminophen tablet 650 mg (has no administration in time range)   heparin (porcine) injection 5,000 Units (has no administration in time range)   0.9%  NaCl infusion (has no administration in time range)   cefepime in dextrose 5 % 1 gram/50 mL IVPB 1 g (has no administration in time range)   vancomycin - pharmacy to dose (has no administration in time range)   vancomycin 1.25 g in dextrose 5% 250 mL IVPB (ready to mix) (has no administration in time range)     Medical Decision Making:   History:   I obtained history from:  someone other than patient.  Old Medical Records: I decided to obtain old medical records.  Initial Assessment:   28 yo W with extensive pmhx including ESRD 2/2 diabetic nephropathy and HTP s/p kidney/pancreas tx (11/3/22), recent hospitalization for pyelonephritis discharged on Cipro presents from infusion clinic with nausea and vomiting.   Differential Diagnosis:   Pyelonephritis, GEORGE, dehydration, infectious issues, postop complication  Clinical Tests:   Lab Tests: Ordered and Reviewed  Radiological Study: Ordered  Medical Tests: Ordered  ED Management:  Patient borderline hypotensive and tachycardic.  Will administer fluids.  She does not appear shocky.  Will obtain labs.  Kidney transplant surgery consulted and they will admit the patient.  Additional imaging ordered per their plan.                        Clinical Impression:   Final diagnoses:  [R00.0] Tachycardia  [R11.2] Nausea & vomiting        ED Disposition Condition    Observation                 Mendez Fuller MD  12/01/22 [id number]       Mendez Fuller MD  12/01/22 9962

## 2022-12-01 NOTE — PROGRESS NOTES
Pharmacokinetic Initial Assessment: IV Vancomycin    Assessment/Plan:    Scr is increasing  Initiate intravenous vancomycin with loading dose of 1250 mg once, done in ED  Desired empiric serum trough concentration is 10 to 15 mcg/mL  Draw vancomycin random level on 12/02/22 at 1700.  Pharmacy will continue to follow and monitor vancomycin.      Please contact pharmacy at extension [phone] with any questions regarding this assessment.     Thank you for the consult,   Gabe Shepard       Patient brief summary:  Isabela Read is a 27 y.o. female initiated on antimicrobial therapy with IV Vancomycin for treatment of suspected urinary tract infection    Drug Allergies:   Review of patient's allergies indicates:  No Known Allergies    Actual Body Weight:   61.2 kg    Renal Function:   Estimated Creatinine Clearance: 26.1 mL/min (A) (based on SCr of 2.8 mg/dL (H)).,     Dialysis Method (if applicable):  N/A    CBC (last 72 hours):  Recent Labs   Lab Result Units 12/01/22  0909   WBC K/uL 11.09   Hemoglobin g/dL 9.5*   Hematocrit % 29.2*   Platelets K/uL 539*   Gran % % 93.8*   Lymph % % 3.2*   Mono % % 1.4*   Eosinophil % % 0.9   Basophil % % 0.1   Differential Method  Automated       Metabolic Panel (last 72 hours):  Recent Labs   Lab Result Units 12/01/22  0909   Sodium mmol/L 134*   Potassium mmol/L 4.2   Chloride mmol/L 103   CO2 mmol/L 22*   Glucose mg/dL 106   BUN mg/dL 19   Creatinine mg/dL 2.8*   Albumin g/dL 3.2*  3.2*   Total Bilirubin mg/dL 0.3   Alkaline Phosphatase U/L 97   AST U/L 10   ALT U/L 10   Phosphorus mg/dL 1.9*       Drug levels (last 3 results):  No results for input(s): VANCOMYCINRA, VANCORANDOM, VANCOMYCINPE, VANCOPEAK, VANCOMYCINTR, VANCOTROUGH in the last 72 hours.    Microbiologic Results:  Microbiology Results (last 7 days)       Procedure Component Value Units Date/Time    Blood culture - site #1 [[id number]] Collected: 12/01/22 1632    Order Status: Sent Specimen: Blood from Peripheral,  Wrist, Right Updated: 12/01/22 1633    Blood culture - site #2 [[id number]]     Order Status: No result Specimen: Blood     Urine culture [[id number]]     Order Status: No result Specimen: Urine

## 2022-12-01 NOTE — H&P
[facility] Emergency Dept  Kidney Transplant  H&P      Subjective:     Chief Complaint/Reason for Admission: Nausea/vomiting     History of Present Illness:  Ms. Read is a 27 y.o.  female with ESRD secondary to diabetic nephropathy and HTN, now s/p SPK 11/3/22 (Thymo induction, CMV D-/R+). Her post-op course was complicated by urinary retention requiring Moncada replacement, now resolved. She was recently admitted to the hospital and discharged 11/29/22 after presenting with nausea/vomiting, GEORGE, and had fever. Infectious work up performed. Urine cx had +multiple organisms none in predominance. Kidney US with edema/possible pyelo, ID consulted. She was initially treated with broad spectrum antibiotics. She was discharged on PO cipro x 7 days at [facility]. She now presents to the ED with complaints of ongoing nausea/vomiting, decreased appetite, intermittent abdominal pain. She had ureteral stent removed outpatient yesterday 11/30/22. She was in infusion center today to give IVF but had episode of vomiting so she was sent to the ED. She denies dysuria/urgency. She denies history of gastroparesis (has had gastric emptying study in 2020 that was normal). In ED, she is noted to be tachycardic in the 130s, blood pressure 90s, and fever of 100.4. EKG in ED shows sinus tachycardia. Creatinine remains elevated above baseline. She is receiving 1L LR bolus in ED. Plan for repeat infectious work up, IV antibiotics, IVF hydration, kidney US, and CT A/P. Will also check CMV PCR.        (Not in a hospital admission)      Review of patient's allergies indicates:  No Known Allergies    Past Medical History:   Diagnosis Date    Acute kidney injury superimposed on chronic kidney disease 4/7/2021    Anemia     Chronic hypertension with exacerbation during pregnancy in second trimester 11/6/2020    Current regimen (11/6/20):  - Carvedilol 12.5 mg BID - Nifedipine 30 mg daily Baseline CKD + proteinuria    Chronic kidney disease      Depression     Diabetes mellitus     Diabetic retinopathy     Diarrhea     Encounter for blood transfusion     End stage renal failure on dialysis     Gastroparesis     Glaucoma     Hepatomegaly 4/29/2021    Hx of psychiatric care     Hyperlipidemia     Hypertension     Nephrotic syndrome     Nephrotic syndrome 3/4/2021    Nonspecific reaction to tuberculin skin test without active tuberculosis 10/1/2021    Palpitations     Poor fetal growth affecting management of mother in second trimester 10/15/2020    Restrictive lung disease     Retinal detachment     Severe pre-eclampsia in second trimester 11/6/2020    Type 1 diabetes mellitus with end-stage renal disease (ESRD) 2/24/2015    Followed by Dr. Mayo.  Last A1C was 13  Currently on lantus 20 units qAM and humolog 10 units with meals  - a fetal echocardiogram around 22-24 weeks - needs eye exam, podiatry exam  - needs EKG, maternal echo and fu with cardiology  - ASA 81mg, folic acid 4mg PO daily - hemoglobin A1c every 4-6 weeks -24 hour urine for protein and creatinine clearance should be performed. Patient will also need a     Past Surgical History:   Procedure Laterality Date    AV FISTULA PLACEMENT Left 04/07/2021    Procedure: CREATION, AV FISTULA;  Surgeon: Roberto Ryan MD;  Location: [facility] OR [address] [place] [address];  Service: Peripheral Vascular;  Laterality: Left;    COLONOSCOPY N/A 03/16/2022    Procedure: COLONOSCOPY;  Surgeon: Tavo Kwok MD;  Location: [facility] (4TH FLR);  Service: Endoscopy;  Laterality: N/A;  Questionable history of delayed gastric emptying longstanding diabetes now on eating pre transplant workup for history of nausea vomiting which seems to have improved with dialysis also chronic diarrhea and history of anemia pre transplant workup for kidney transplant. 3    CYSTOSCOPY      ESOPHAGOGASTRODUODENOSCOPY N/A 03/16/2022    Procedure: EGD (ESOPHAGOGASTRODUODENOSCOPY);  Surgeon: Tavo Kwok MD;  Location: [facility] (4TH FLR);   Service: Endoscopy;  Laterality: N/A;  Questionable history of delayed gastric emptying longstanding diabetes now on eating pre transplant workup for history of nausea vomiting which seems to have improved with dialysis also chronic diarrhea and history of anemia pre transplant workup for    FISTULOGRAM N/A 08/11/2021    Procedure: Fistulogram;  Surgeon: Roberto Ryan MD;  Location: [facility] CATH LAB;  Service: Cardiology;  Laterality: N/A;    KIDNEY TRANSPLANT Right 11/02/2022    Procedure: TRANSPLANT, KIDNEY;  Surgeon: Kumar Rodriges Jr., MD;  Location: [facility] OR 2ND FLR;  Service: Transplant;  Laterality: Right;    LASER PHOTOCOAGULATION OF RETINA Right 05/31/2022    Procedure: PHOTOCOAGULATION, RETINA, USING LASER;  Surgeon: Maximilian Montaño MD;  [facility];  Service: Ophthalmology;  Laterality: Right;    PERCUTANEOUS TRANSLUMINAL ANGIOPLASTY OF ARTERIOVENOUS FISTULA N/A 08/11/2021    Procedure: PTA, AV FISTULA;  Surgeon: Roberto Ryan MD;  Location: [facility] CATH LAB;  Service: Cardiology;  Laterality: N/A;    REMOVAL IMPLANT, POSTERIOR SEGMENT, INTRAOCULAR Right 02/01/2022    Procedure: REMOVAL IMPLANT, POSTERIOR SEGMENT, INTRAOCULAR;  Surgeon: Maximilian Montaño MD;  [facility];  Service: Ophthalmology;  Laterality: Right;    REPAIR OF RETINAL DETACHMENT WITH VITRECTOMY Right 01/25/2022    Procedure: REPAIR, RETINAL DETACHMENT, WITH VITRECTOMY, MEMBRANE PEEL, LASER, INJECTION OF GAS VS OIL;  Surgeon: Maximilian Montaño MD;  [facility];  Service: Ophthalmology;  Laterality: Right;    REVISION OF ARTERIOVENOUS FISTULA Left 12/10/2021    Procedure: REVISION, AV FISTULA with BVT;  Surgeon: Roberto Ryan MD;  [facility];  Service: Peripheral Vascular;  Laterality: Left;    TRANSPLANTATION OF PANCREAS N/A 11/02/2022    Procedure: TRANSPLANT, PANCREAS;  Surgeon: Kumar Rodriges Jr., MD;  Location: [facility] OR 2ND FLR;  Service:  Transplant;  Laterality: N/A;    VITRECTOMY BY PARS PLANA APPROACH Right 02/01/2022    Procedure: VITRECTOMY, PARS PLANA APPROACH;  Surgeon: Maximilian Montaño MD;  Location: [facility] OR [address] [place] [address];  Service: Ophthalmology;  Laterality: Right;    VITRECTOMY BY PARS PLANA APPROACH Right 05/31/2022    Procedure: VITRECTOMY, PARS PLANA APPROACH;  Surgeon: Maximilian Montaño MD;  Location: [facility] OR [address] [place] [address];  Service: Ophthalmology;  Laterality: Right;     Family History       Problem Relation (Age of Onset)    Diabetes Brother    Heart disease Father    Hypertension Mother          Tobacco Use    Smoking status: Never    Smokeless tobacco: Never   Substance and Sexual Activity    Alcohol use: No    Drug use: No    Sexual activity: Not Currently     Partners: Male     Comment: monogamous        Review of Systems   Constitutional:  Positive for activity change, appetite change (decreased appetite), fatigue and fever. Negative for chills.   Respiratory:  Negative for shortness of breath.    Gastrointestinal:  Positive for nausea and vomiting. Negative for abdominal distention and diarrhea.   Genitourinary:  Negative for difficulty urinating, dysuria and urgency.   Skin:  Positive for wound.   Allergic/Immunologic: Positive for immunocompromised state.   Psychiatric/Behavioral:  Negative for agitation and confusion.    Objective:     Vital Signs (Most Recent):  Temp: (!) 100.4 °F (38 °C) (12/01/22 1607)  Pulse: (!) 120 (12/01/22 1607)  Resp: 20 (12/01/22 1607)  BP: 104/65 (12/01/22 1607)  SpO2: 100 % (12/01/22 1607)       Weight: 61.2 kg (135 lb)  Body mass index is 23.17 kg/m².     Physical Exam  Vitals and nursing note reviewed.   Cardiovascular:      Rate and Rhythm: Normal rate and regular rhythm.      Heart sounds: No murmur heard.    No gallop.   Pulmonary:      Effort: Pulmonary effort is normal.      Breath sounds: No wheezing or rales.   Abdominal:      General: There is no distension.      Tenderness: There is no  abdominal tenderness. There is no guarding or rebound.   Musculoskeletal:      Right lower leg: No edema.      Left lower leg: No edema.   Neurological:      Mental Status: She is alert and oriented to person, place, and time.   Psychiatric:         Mood and Affect: Mood normal.         Behavior: Behavior normal.         Thought Content: Thought content normal.         Judgment: Judgment normal.       Laboratory  CBC:   Recent Labs   Lab 11/27/22  0702 11/28/22  0640 12/01/22  0909   WBC 8.17 7.30 11.09   RBC 2.83* 3.06* 3.06*   HGB 8.9* 9.5* 9.5*   HCT 26.4* 29.9* 29.2*    378 539*   MCV 93 98 95   MCH 31.4* 31.0 31.0   MCHC 33.7 31.8* 32.5     CMP:   Recent Labs   Lab 11/25/22  1051 11/25/22  2038 11/27/22  0702 11/28/22  0640 12/01/22  0909      < > 96 79 106   CALCIUM 8.4*   < > 8.0* 9.2 10.1   ALBUMIN 3.7   < > 2.8* 3.0* 3.2*  3.2*   PROT 8.0  --   --   --  7.9   *   < > 138 143 134*   K 5.5*   < > 3.8 4.5 4.2   CO2 17*   < > 17* 18* 22*      < > 110 115* 103   BUN 43*   < > 25* 21* 19   CREATININE 5.2*   < > 3.4* 2.5* 2.8*   ALKPHOS 100  --   --   --  97   ALT 20  --   --   --  10   AST 25  --   --   --  10    < > = values in this interval not displayed.       Diagnostic Results:  None        Assessment/Plan:     * SIRS (systemic inflammatory response syndrome)  - Infectious work up ordered (blood cx x 2, UA/cx, CMV PCR)  - IVF hydration  - Broad spectrum antibiotics (cefepime/vanc)  - CT A/P and Kidney US ordered  - Monitor closely      Fever  - See SIRS      Nausea & vomiting  - CT A/P without contrast ordered to assess  - Continue prn antiemetics  - Continue IVF      Status post pancreas transplantation  - Amylase/lipase WNL on AM labs. Repeat ordered on admission  - Pancreas US 11/25 satisfactory  - Monitor with daily labs      Long-term use of immunosuppressant medication  - Continue prograf. Hold cellcept for GI upset. Monitor prograf level daily, monitor for toxic side  effects, and adjust for therapeutic dose       Prophylactic immunotherapy  - See long term use of immunosuppressant medication      Status post -donor kidney transplantation  - With ongoing GEORGE  - Had ureteral stent removed 22  - Plan for UA/cx  - Plan for repeat Kidney US  - IVF for hydration  - Monitor with daily labs      GEORGE (acute kidney injury)  - See s/p kidney transplant      Anemia due to chronic kidney disease  - H/H stable on AM labs  - Monitor with daily cbc      Renovascular hypertension  - Hold antihypertensives for now as with hypotension from SIRS vs dehydration  - Assess daily and restart when appropriate            Discharge Planning: Not a candidate for discharge at this time      Daniella Vargas, PA-C  Kidney Transplant  [facility] - [facility] Dept

## 2022-12-01 NOTE — CARE UPDATE
RAPID RESPONSE NURSE CHART REVIEW       Chart Reviewed: 12/01/2022, 5:59 PM    MRN: [id number]  Bed: OBS07/EDOU07    Dx: SIRS (systemic inflammatory response syndrome)    Isabela Read has a past medical history of Acute kidney injury superimposed on chronic kidney disease, Anemia, Chronic hypertension with exacerbation during pregnancy in second trimester, Chronic kidney disease, Depression, Diabetes mellitus, Diabetic retinopathy, Diarrhea, Encounter for blood transfusion, End stage renal failure on dialysis, Gastroparesis, Glaucoma, Hepatomegaly, psychiatric care, Hyperlipidemia, Hypertension, Nephrotic syndrome, Nephrotic syndrome, Nonspecific reaction to tuberculin skin test without active tuberculosis, Palpitations, Poor fetal growth affecting management of mother in second trimester, Restrictive lung disease, Retinal detachment, Severe pre-eclampsia in second trimester, and Type 1 diabetes mellitus with end-stage renal disease (ESRD).    Last VS: /65 (BP Location: Right arm, Patient Position: Lying)   Pulse (!) 120   Temp (!) 100.4 °F (38 °C) (Oral)   Resp 20   Wt 61.2 kg (135 lb)   LMP 11/29/2022   SpO2 100%   Breastfeeding No   BMI 23.17 kg/m²     24H Vital Sign Range:  Temp:  [97.2 °F (36.2 °C)-100.4 °F (38 °C)]   Pulse:  [120-133]   Resp:  [16-20]   BP: ()/(47-77)   SpO2:  [99 %-100 %]     Level of Consciousness (AVPU): alert    Recent Labs     12/01/22  0909 12/01/22  1630   WBC 11.09 14.22*   HGB 9.5* 7.4*   HCT 29.2* 22.8*   * 414       Recent Labs     12/01/22  0909 12/01/22  1630   * 131*   K 4.2 4.6    105   CO2 22* 17*   CREATININE 2.8* 3.0*    118*   PHOS 1.9* 1.9*   MG  --  1.6        No results for input(s): PH, PCO2, PO2, HCO3, POCSATURATED, BE in the last 72 hours.     OXYGEN:        O2 Device (Oxygen Therapy): room air    MEWS score:       Sepsis alert - screened positive. Appropriate interventions in place.     Florentin Guidry RN

## 2022-12-01 NOTE — SUBJECTIVE & OBJECTIVE
Subjective:     Chief Complaint/Reason for Admission: Nausea/vomiting     History of Present Illness:  Ms. Read is a 27 y.o.  female with ESRD secondary to diabetic nephropathy and HTN, now s/p SPK 11/3/22 (Thymo induction, CMV D-/R+). Her post-op course was complicated by urinary retention requiring Moncada replacement, now resolved. She was recently admitted to the hospital and discharged 11/29/22 after presenting with nausea/vomiting, GEORGE, and had fever. Infectious work up performed. Urine cx had +multiple organisms none in predominance. Kidney US with edema/possible pyelo, ID consulted. She was initially treated with broad spectrum antibiotics. She was discharged on PO cipro x 7 days at [place]. She now presents to the ED with complaints of ongoing nausea/vomiting, decreased appetite, intermittent abdominal pain. She had ureteral stent removed outpatient yesterday 11/30/22. She was in infusion center today to give IVF but had episode of vomiting so she was sent to the ED. She denies dysuria/urgency. She denies history of gastroparesis (has had gastric emptying study in 2020 that was normal). In ED, she is noted to be tachycardic in the 130s, blood pressure 90s. Creatinine remains elevated above baseline. She is receiving 1L LR bolus in ED. Plan for repeat infectious work up, IVF hydration, kidney US, and CT A/P. Will also check CMV PCR.        (Not in a hospital admission)      Review of patient's allergies indicates:  No Known Allergies    Past Medical History:   Diagnosis Date    Acute kidney injury superimposed on chronic kidney disease 4/7/2021    Anemia     Chronic hypertension with exacerbation during pregnancy in second trimester 11/6/2020    Current regimen (11/6/20):  - Carvedilol 12.5 mg BID - Nifedipine 30 mg daily Baseline CKD + proteinuria    Chronic kidney disease     Depression     Diabetes mellitus     Diabetic retinopathy     Diarrhea     Encounter for blood transfusion     End  stage renal failure on dialysis     Gastroparesis     Glaucoma     Hepatomegaly 4/29/2021    Hx of psychiatric care     Hyperlipidemia     Hypertension     Nephrotic syndrome     Nephrotic syndrome 3/4/2021    Nonspecific reaction to tuberculin skin test without active tuberculosis 10/1/2021    Palpitations     Poor fetal growth affecting management of mother in second trimester 10/15/2020    Restrictive lung disease     Retinal detachment     Severe pre-eclampsia in second trimester 11/6/2020    Type 1 diabetes mellitus with end-stage renal disease (ESRD) 2/24/2015    Followed by Dr. Mayo.  Last A1C was 13  Currently on lantus 20 units qAM and humolog 10 units with meals  - a fetal echocardiogram around 22-24 weeks - needs eye exam, podiatry exam  - needs EKG, maternal echo and fu with cardiology  - ASA 81mg, folic acid 4mg PO daily - hemoglobin A1c every 4-6 weeks -24 hour urine for protein and creatinine clearance should be performed. Patient will also need a     Past Surgical History:   Procedure Laterality Date    AV FISTULA PLACEMENT Left 04/07/2021    Procedure: CREATION, AV FISTULA;  Surgeon: Roberto Ryan MD;  [facility]  Service: Peripheral Vascular;  Laterality: Left;    COLONOSCOPY N/A 03/16/2022    Procedure: COLONOSCOPY;  Surgeon: Tavo Kwok MD;  Location: [facility] (4TH FLR);  Service: Endoscopy;  Laterality: N/A;  Questionable history of delayed gastric emptying longstanding diabetes now on eating pre transplant workup for history of nausea vomiting which seems to have improved with dialysis also chronic diarrhea and history of anemia pre transplant workup for kidney transplant. 3    CYSTOSCOPY      ESOPHAGOGASTRODUODENOSCOPY N/A 03/16/2022    Procedure: EGD (ESOPHAGOGASTRODUODENOSCOPY);  Surgeon: Tavo Kwok MD;  Location: [facility] ENDO (4TH FLR);  Service: Endoscopy;  Laterality: N/A;  Questionable history of delayed gastric emptying longstanding diabetes now on  eating pre transplant workup for history of nausea vomiting which seems to have improved with dialysis also chronic diarrhea and history of anemia pre transplant workup for    FISTULOGRAM N/A 08/11/2021    Procedure: Fistulogram;  Surgeon: Roberto Ryan MD;  Location: [facility] CATH LAB;  Service: Cardiology;  Laterality: N/A;    KIDNEY TRANSPLANT Right 11/02/2022    Procedure: TRANSPLANT, KIDNEY;  Surgeon: Kumar Rodriges Jr., MD;  Location: [facility] OR 2ND FLR;  Service: Transplant;  Laterality: Right;    LASER PHOTOCOAGULATION OF RETINA Right 05/31/2022    Procedure: PHOTOCOAGULATION, RETINA, USING LASER;  Surgeon: Maximilian Montaño MD;  Location: [facility] OR 1ST FLR;  Service: Ophthalmology;  Laterality: Right;    PERCUTANEOUS TRANSLUMINAL ANGIOPLASTY OF ARTERIOVENOUS FISTULA N/A 08/11/2021    Procedure: PTA, AV FISTULA;  Surgeon: Roberto Ryan MD;  Location: [facility] CATH LAB;  Service: Cardiology;  Laterality: N/A;    REMOVAL IMPLANT, POSTERIOR SEGMENT, INTRAOCULAR Right 02/01/2022    Procedure: REMOVAL IMPLANT, POSTERIOR SEGMENT, INTRAOCULAR;  Surgeon: Maximilian Montaño MD;  [facility]  Service: Ophthalmology;  Laterality: Right;    REPAIR OF RETINAL DETACHMENT WITH VITRECTOMY Right 01/25/2022    Procedure: REPAIR, RETINAL DETACHMENT, WITH VITRECTOMY, MEMBRANE PEEL, LASER, INJECTION OF GAS VS OIL;  Surgeon: Maximilian Montaño MD;  [facility];  Service: Ophthalmology;  Laterality: Right;    REVISION OF ARTERIOVENOUS FISTULA Left 12/10/2021    Procedure: REVISION, AV FISTULA with BVT;  Surgeon: Roberto Ryan MD;  Location: [facility] OR 2ND FLR;  Service: Peripheral Vascular;  Laterality: Left;    TRANSPLANTATION OF PANCREAS N/A 11/02/2022    Procedure: TRANSPLANT, PANCREAS;  Surgeon: Kumar Rodriges Jr., MD;  Location: [facility] OR 2ND FLR;  Service: Transplant;  Laterality: N/A;    VITRECTOMY BY PARS PLANA APPROACH Right 02/01/2022    Procedure: VITRECTOMY, PARS PLANA  APPROACH;  Surgeon: Maximilian Montaño MD;  Location: [facility] OR [address] [place] [address];  Service: Ophthalmology;  Laterality: Right;    VITRECTOMY BY PARS PLANA APPROACH Right 05/31/2022    Procedure: VITRECTOMY, PARS PLANA APPROACH;  Surgeon: Maximilian Montaño MD;  Location: [facility] OR [address] [place] [address];  Service: Ophthalmology;  Laterality: Right;     Family History       Problem Relation (Age of Onset)    Diabetes Brother    Heart disease Father    Hypertension Mother          Tobacco Use    Smoking status: Never    Smokeless tobacco: Never   Substance and Sexual Activity    Alcohol use: No    Drug use: No    Sexual activity: Not Currently     Partners: Male     Comment: monogamous        Review of Systems   Constitutional:  Positive for activity change, appetite change (decreased appetite), fatigue and fever. Negative for chills.   Respiratory:  Negative for shortness of breath.    Gastrointestinal:  Positive for nausea and vomiting. Negative for abdominal distention and diarrhea.   Genitourinary:  Negative for difficulty urinating, dysuria and urgency.   Skin:  Positive for wound.   Allergic/Immunologic: Positive for immunocompromised state.   Psychiatric/Behavioral:  Negative for agitation and confusion.    Objective:     Vital Signs (Most Recent):  Temp: (!) 100.4 °F (38 °C) (12/01/22 1607)  Pulse: (!) 120 (12/01/22 1607)  Resp: 20 (12/01/22 1607)  BP: 104/65 (12/01/22 1607)  SpO2: 100 % (12/01/22 1607)       Weight: 61.2 kg (135 lb)  Body mass index is 23.17 kg/m².     Physical Exam  Vitals and nursing note reviewed.   Cardiovascular:      Rate and Rhythm: Normal rate and regular rhythm.      Heart sounds: No murmur heard.    No gallop.   Pulmonary:      Effort: Pulmonary effort is normal.      Breath sounds: No wheezing or rales.   Abdominal:      General: There is no distension.      Tenderness: There is no abdominal tenderness. There is no guarding or rebound.   Musculoskeletal:      Right lower leg: No edema.      Left lower  leg: No edema.   Neurological:      Mental Status: She is alert and oriented to person, place, and time.   Psychiatric:         Mood and Affect: Mood normal.         Behavior: Behavior normal.         Thought Content: Thought content normal.         Judgment: Judgment normal.       Laboratory  CBC:   Recent Labs   Lab 11/27/22  0702 11/28/22  0640 12/01/22  0909   WBC 8.17 7.30 11.09   RBC 2.83* 3.06* 3.06*   HGB 8.9* 9.5* 9.5*   HCT 26.4* 29.9* 29.2*    378 539*   MCV 93 98 95   MCH 31.4* 31.0 31.0   MCHC 33.7 31.8* 32.5     CMP:   Recent Labs   Lab 11/25/22  1051 11/25/22 2038 11/27/22  0702 11/28/22  0640 12/01/22  0909      < > 96 79 106   CALCIUM 8.4*   < > 8.0* 9.2 10.1   ALBUMIN 3.7   < > 2.8* 3.0* 3.2*  3.2*   PROT 8.0  --   --   --  7.9   *   < > 138 143 134*   K 5.5*   < > 3.8 4.5 4.2   CO2 17*   < > 17* 18* 22*      < > 110 115* 103   BUN 43*   < > 25* 21* 19   CREATININE 5.2*   < > 3.4* 2.5* 2.8*   ALKPHOS 100  --   --   --  97   ALT 20  --   --   --  10   AST 25  --   --   --  10    < > = values in this interval not displayed.       Diagnostic Results:  None

## 2022-12-01 NOTE — PROGRESS NOTES
"1404: Pt vomits and feels very nauseated and weak;  Msgd Dr. Broussard to see if pt can have Zofran;      1410: Pt given 8mg zofran IVP; pt now having "stomach cramps"; pain 6/10; she requests tylenol;    1415: Dr. Broussard requests that pt goes to ED;  Pt will have mother meet her here at [facility];  pt given 650mg Tylenol PO; pt wheeled to ED via wheelchair w/ RN;     Pt received an approximate total of 300ml of NS from bolus before leaving; Pt's left AC PIV ns locked and capped;     "

## 2022-12-01 NOTE — ASSESSMENT & PLAN NOTE
- With ongoing GEORGE  - Had ureteral stent removed 11/30/22  - Plan for UA/cx  - Plan for repeat Kidney US  - IVF for hydration  - Monitor with daily labs

## 2022-12-01 NOTE — TELEPHONE ENCOUNTER
Message sent to patient via [facility] regarding medication change and plan to repeat labs on Saturday.    ----- Message from Tammie Broussard DO sent at 12/1/2022 11:23 AM CST -----  Graft function remains elevated from baseline of 0.9. Set up for IVF today. Repeat renal function and amylase/lipase on Saturday to see if graft function improving (can call me on Sat)  Urine culture reviewed. Pending ID appointment today   Low phos: likely from decreased appetite. Start on kphos neutral 1 tab BID  FK pending

## 2022-12-01 NOTE — ED NOTES
Pt identifiers Isabela Read were checked and are correct  LOC: The patient is awake, alert, aware of environment with an appropriate affect. Oriented x4, speaking appropriately  APPEARANCE: Pt resting comfortably, in no acute distress, pt is clean and well groomed, clothing properly fastened  SKIN: Skin warm, dry and intact, normal skin turgor, moist mucus membranes  Midline abd incision with steri strips intact   RESPIRATORY: Airway is open and patent, respirations are spontaneous, even and unlabored, normal effort and rate  CARDIAC: Normal rate and rhythm, no peripheral edema noted, capillary refill < 3 seconds, bilateral radial pulses 2+  ABDOMEN: Soft, Left side lower abd pain on movement  Bowel sounds present to all four quad of abd on auscultation  NEUROLOGIC: Right eye surgery for detached retina Has partial vision in right eye facial expression is symmetrical, patient moving all extremities spontaneously, normal sensation in all extremities when touched with a finger.  Follows all commands appropriately  MUSCULOSKELETAL: No obvious deformities.

## 2022-12-01 NOTE — ASSESSMENT & PLAN NOTE
- Hold antihypertensives for now as with hypotension from SIRS vs dehydration  - Assess daily and restart when appropriate

## 2022-12-01 NOTE — ASSESSMENT & PLAN NOTE
- Infectious work up ordered (blood cx x 2, UA/cx, CMV PCR)  - IVF hydration  - Broad spectrum antibiotics (cefepime/vanc)  - CT A/P and Kidney US ordered  - Monitor closely

## 2022-12-01 NOTE — ASSESSMENT & PLAN NOTE
- Continue prograf. Hold cellcept for GI upset. Monitor prograf level daily, monitor for toxic side effects, and adjust for therapeutic dose

## 2022-12-02 LAB
ABO + RH BLD: NORMAL
ALBUMIN SERPL BCP-MCNC: 2.6 G/DL (ref 3.5–5.2)
AMYLASE SERPL-CCNC: 46 U/L (ref 20–110)
ANION GAP SERPL CALC-SCNC: 9 MMOL/L (ref 8–16)
BASOPHILS # BLD AUTO: 0.01 K/UL (ref 0–0.2)
BASOPHILS # BLD AUTO: 0.01 K/UL (ref 0–0.2)
BASOPHILS NFR BLD: 0.1 % (ref 0–1.9)
BASOPHILS NFR BLD: 0.1 % (ref 0–1.9)
BLD GP AB SCN CELLS X3 SERPL QL: NORMAL
BUN SERPL-MCNC: 22 MG/DL (ref 6–20)
CALCIUM SERPL-MCNC: 8.7 MG/DL (ref 8.7–10.5)
CHLORIDE SERPL-SCNC: 104 MMOL/L (ref 95–110)
CMV DNA SPEC QL NAA+PROBE: NOT DETECTED
CO2 SERPL-SCNC: 19 MMOL/L (ref 23–29)
CREAT SERPL-MCNC: 3 MG/DL (ref 0.5–1.4)
CYTOMEGALOVIRUS LOG (IU/ML): NOT DETECTED LOGIU/ML
CYTOMEGALOVIRUS PCR, QUANT: NOT DETECTED IU/ML
DIFFERENTIAL METHOD: ABNORMAL
DIFFERENTIAL METHOD: ABNORMAL
EOSINOPHIL # BLD AUTO: 0.1 K/UL (ref 0–0.5)
EOSINOPHIL # BLD AUTO: 0.1 K/UL (ref 0–0.5)
EOSINOPHIL NFR BLD: 0.8 % (ref 0–8)
EOSINOPHIL NFR BLD: 1 % (ref 0–8)
ERYTHROCYTE [DISTWIDTH] IN BLOOD BY AUTOMATED COUNT: 15.1 % (ref 11.5–14.5)
ERYTHROCYTE [DISTWIDTH] IN BLOOD BY AUTOMATED COUNT: 15.1 % (ref 11.5–14.5)
EST. GFR  (NO RACE VARIABLE): 21.2 ML/MIN/1.73 M^2
GLUCOSE SERPL-MCNC: 89 MG/DL (ref 70–110)
HAPTOGLOB SERPL-MCNC: 369 MG/DL (ref 30–250)
HCT VFR BLD AUTO: 20 % (ref 37–48.5)
HCT VFR BLD AUTO: 22.5 % (ref 37–48.5)
HGB BLD-MCNC: 6.5 G/DL (ref 12–16)
HGB BLD-MCNC: 7.2 G/DL (ref 12–16)
IMM GRANULOCYTES # BLD AUTO: 0.07 K/UL (ref 0–0.04)
IMM GRANULOCYTES # BLD AUTO: 0.09 K/UL (ref 0–0.04)
IMM GRANULOCYTES NFR BLD AUTO: 0.8 % (ref 0–0.5)
IMM GRANULOCYTES NFR BLD AUTO: 1 % (ref 0–0.5)
LDH SERPL L TO P-CCNC: 295 U/L (ref 110–260)
LIPASE SERPL-CCNC: 8 U/L (ref 4–60)
LYMPHOCYTES # BLD AUTO: 0.5 K/UL (ref 1–4.8)
LYMPHOCYTES # BLD AUTO: 0.5 K/UL (ref 1–4.8)
LYMPHOCYTES NFR BLD: 5.5 % (ref 18–48)
LYMPHOCYTES NFR BLD: 5.8 % (ref 18–48)
MAGNESIUM SERPL-MCNC: 1.4 MG/DL (ref 1.6–2.6)
MCH RBC QN AUTO: 30.7 PG (ref 27–31)
MCH RBC QN AUTO: 30.9 PG (ref 27–31)
MCHC RBC AUTO-ENTMCNC: 32 G/DL (ref 32–36)
MCHC RBC AUTO-ENTMCNC: 32.7 G/DL (ref 32–36)
MCV RBC AUTO: 94 FL (ref 82–98)
MCV RBC AUTO: 97 FL (ref 82–98)
MONOCYTES # BLD AUTO: 0.3 K/UL (ref 0.3–1)
MONOCYTES # BLD AUTO: 0.4 K/UL (ref 0.3–1)
MONOCYTES NFR BLD: 3.6 % (ref 4–15)
MONOCYTES NFR BLD: 4.1 % (ref 4–15)
NEUTROPHILS # BLD AUTO: 7.7 K/UL (ref 1.8–7.7)
NEUTROPHILS # BLD AUTO: 8.2 K/UL (ref 1.8–7.7)
NEUTROPHILS NFR BLD: 88.5 % (ref 38–73)
NEUTROPHILS NFR BLD: 88.7 % (ref 38–73)
NRBC BLD-RTO: 0 /100 WBC
NRBC BLD-RTO: 0 /100 WBC
PHOSPHATE SERPL-MCNC: 4.6 MG/DL (ref 2.7–4.5)
PLATELET # BLD AUTO: 376 K/UL (ref 150–450)
PLATELET # BLD AUTO: 378 K/UL (ref 150–450)
PMV BLD AUTO: 10.1 FL (ref 9.2–12.9)
PMV BLD AUTO: 10.1 FL (ref 9.2–12.9)
POTASSIUM SERPL-SCNC: 3.7 MMOL/L (ref 3.5–5.1)
RBC # BLD AUTO: 2.12 M/UL (ref 4–5.4)
RBC # BLD AUTO: 2.33 M/UL (ref 4–5.4)
RETICS/RBC NFR AUTO: 1.4 % (ref 0.5–2.5)
SODIUM SERPL-SCNC: 132 MMOL/L (ref 136–145)
TACROLIMUS BLD-MCNC: 9.3 NG/ML (ref 5–15)
VANCOMYCIN SERPL-MCNC: 18.7 UG/ML
WBC # BLD AUTO: 8.71 K/UL (ref 3.9–12.7)
WBC # BLD AUTO: 9.27 K/UL (ref 3.9–12.7)

## 2022-12-02 PROCEDURE — 85025 COMPLETE CBC W/AUTO DIFF WBC: CPT | Mod: 91 | Performed by: PHYSICIAN ASSISTANT

## 2022-12-02 PROCEDURE — 25000003 PHARM REV CODE 250: Performed by: PHYSICIAN ASSISTANT

## 2022-12-02 PROCEDURE — 80202 ASSAY OF VANCOMYCIN: CPT | Performed by: INTERNAL MEDICINE

## 2022-12-02 PROCEDURE — 83690 ASSAY OF LIPASE: CPT | Performed by: PHYSICIAN ASSISTANT

## 2022-12-02 PROCEDURE — 85025 COMPLETE CBC W/AUTO DIFF WBC: CPT | Performed by: PHYSICIAN ASSISTANT

## 2022-12-02 PROCEDURE — 20600001 HC STEP DOWN PRIVATE ROOM

## 2022-12-02 PROCEDURE — 80197 ASSAY OF TACROLIMUS: CPT | Performed by: PHYSICIAN ASSISTANT

## 2022-12-02 PROCEDURE — 63600175 PHARM REV CODE 636 W HCPCS: Performed by: INTERNAL MEDICINE

## 2022-12-02 PROCEDURE — 86850 RBC ANTIBODY SCREEN: CPT | Performed by: PHYSICIAN ASSISTANT

## 2022-12-02 PROCEDURE — 63600175 PHARM REV CODE 636 W HCPCS: Performed by: PHYSICIAN ASSISTANT

## 2022-12-02 PROCEDURE — 80069 RENAL FUNCTION PANEL: CPT | Performed by: PHYSICIAN ASSISTANT

## 2022-12-02 PROCEDURE — 99233 PR SUBSEQUENT HOSPITAL CARE,LEVL III: ICD-10-PCS | Mod: ,,, | Performed by: PHYSICIAN ASSISTANT

## 2022-12-02 PROCEDURE — 36415 COLL VENOUS BLD VENIPUNCTURE: CPT | Performed by: PHYSICIAN ASSISTANT

## 2022-12-02 PROCEDURE — 83010 ASSAY OF HAPTOGLOBIN QUANT: CPT | Performed by: PHYSICIAN ASSISTANT

## 2022-12-02 PROCEDURE — 36415 COLL VENOUS BLD VENIPUNCTURE: CPT | Performed by: INTERNAL MEDICINE

## 2022-12-02 PROCEDURE — 83615 LACTATE (LD) (LDH) ENZYME: CPT | Performed by: PHYSICIAN ASSISTANT

## 2022-12-02 PROCEDURE — 99233 SBSQ HOSP IP/OBS HIGH 50: CPT | Mod: ,,, | Performed by: PHYSICIAN ASSISTANT

## 2022-12-02 PROCEDURE — 83735 ASSAY OF MAGNESIUM: CPT | Performed by: PHYSICIAN ASSISTANT

## 2022-12-02 PROCEDURE — 82150 ASSAY OF AMYLASE: CPT | Performed by: PHYSICIAN ASSISTANT

## 2022-12-02 PROCEDURE — 86920 COMPATIBILITY TEST SPIN: CPT | Performed by: PHYSICIAN ASSISTANT

## 2022-12-02 PROCEDURE — 94761 N-INVAS EAR/PLS OXIMETRY MLT: CPT

## 2022-12-02 PROCEDURE — 85045 AUTOMATED RETICULOCYTE COUNT: CPT | Performed by: PHYSICIAN ASSISTANT

## 2022-12-02 RX ORDER — MAGNESIUM SULFATE HEPTAHYDRATE 40 MG/ML
2 INJECTION, SOLUTION INTRAVENOUS ONCE
Status: COMPLETED | OUTPATIENT
Start: 2022-12-02 | End: 2022-12-02

## 2022-12-02 RX ORDER — VALGANCICLOVIR 450 MG/1
450 TABLET, FILM COATED ORAL
Status: DISCONTINUED | OUTPATIENT
Start: 2022-12-05 | End: 2022-12-04

## 2022-12-02 RX ORDER — HYDROCODONE BITARTRATE AND ACETAMINOPHEN 500; 5 MG/1; MG/1
TABLET ORAL
Status: DISCONTINUED | OUTPATIENT
Start: 2022-12-02 | End: 2022-12-05

## 2022-12-02 RX ORDER — SULFAMETHOXAZOLE AND TRIMETHOPRIM 400; 80 MG/1; MG/1
1 TABLET ORAL
Status: DISCONTINUED | OUTPATIENT
Start: 2022-12-05 | End: 2022-12-14

## 2022-12-02 RX ADMIN — SULFAMETHOXAZOLE AND TRIMETHOPRIM 1 TABLET: 400; 80 TABLET ORAL at 09:12

## 2022-12-02 RX ADMIN — PREDNISONE 5 MG: 5 TABLET ORAL at 09:12

## 2022-12-02 RX ADMIN — SODIUM BICARBONATE 650 MG TABLET 1300 MG: at 08:12

## 2022-12-02 RX ADMIN — TACROLIMUS 6 MG: 5 CAPSULE ORAL at 09:12

## 2022-12-02 RX ADMIN — VANCOMYCIN HYDROCHLORIDE 500 MG: 500 INJECTION, POWDER, LYOPHILIZED, FOR SOLUTION INTRAVENOUS at 10:12

## 2022-12-02 RX ADMIN — VALGANCICLOVIR 450 MG: 450 TABLET, FILM COATED ORAL at 09:12

## 2022-12-02 RX ADMIN — CEFEPIME HYDROCHLORIDE 1 G: 1 INJECTION, POWDER, FOR SOLUTION INTRAMUSCULAR; INTRAVENOUS at 04:12

## 2022-12-02 RX ADMIN — MAGNESIUM SULFATE 2 G: 2 INJECTION INTRAVENOUS at 09:12

## 2022-12-02 RX ADMIN — ATORVASTATIN CALCIUM 40 MG: 40 TABLET, FILM COATED ORAL at 09:12

## 2022-12-02 RX ADMIN — SODIUM CHLORIDE: 0.9 INJECTION, SOLUTION INTRAVENOUS at 08:12

## 2022-12-02 RX ADMIN — ACETAMINOPHEN 650 MG: 325 TABLET ORAL at 03:12

## 2022-12-02 RX ADMIN — VENLAFAXINE HYDROCHLORIDE 37.5 MG: 37.5 CAPSULE, EXTENDED RELEASE ORAL at 09:12

## 2022-12-02 RX ADMIN — SODIUM BICARBONATE 650 MG TABLET 1300 MG: at 03:12

## 2022-12-02 RX ADMIN — TACROLIMUS 6 MG: 5 CAPSULE ORAL at 05:12

## 2022-12-02 RX ADMIN — SODIUM BICARBONATE 650 MG TABLET 1300 MG: at 09:12

## 2022-12-02 RX ADMIN — CEFEPIME HYDROCHLORIDE 1 G: 1 INJECTION, POWDER, FOR SOLUTION INTRAMUSCULAR; INTRAVENOUS at 05:12

## 2022-12-02 RX ADMIN — SODIUM CHLORIDE: 0.9 INJECTION, SOLUTION INTRAVENOUS at 10:12

## 2022-12-02 RX ADMIN — ASPIRIN 81 MG: 81 TABLET, COATED ORAL at 09:12

## 2022-12-02 NOTE — PROGRESS NOTES
[facility] - Transplant Stepdown  Kidney Transplant  Progress Note      Reason for Follow-up: Reassessment of Kidney, Pancreas Transplant - 11/3/2022  (#1) recipient and management of immunosuppression.    ORGAN:  RIGHT KIDNEY   Donor Type:  Donation after Brain Death       Subjective:   History of Present Illness:  Ms. Read is a 27 y.o.  female with ESRD secondary to diabetic nephropathy and HTN, now s/p SPK 11/3/22 (Thymo induction, CMV D-/R+). Her post-op course was complicated by urinary retention requiring Moncada replacement, now resolved. She was recently admitted to the hospital and discharged 11/29/22 after presenting with nausea/vomiting, GEORGE, and had fever. Infectious work up performed. Urine cx had +multiple organisms none in predominance. Kidney US with edema/possible pyelo, ID consulted. She was initially treated with broad spectrum antibiotics. She was discharged on PO cipro x 7 days at [facility]. She now presents to the ED with complaints of ongoing nausea/vomiting, decreased appetite, intermittent abdominal pain. She had ureteral stent removed outpatient yesterday 11/30/22. She was in infusion center today to give IVF but had episode of vomiting so she was sent to the ED. She denies dysuria/urgency. She denies history of gastroparesis (has had gastric emptying study in 2020 that was normal). In ED, she is noted to be tachycardic in the 130s, blood pressure 90s, along with low grade fever 100.4. EKG shows sinus tachycardia. Creatinine remains elevated above baseline. She is receiving 1L LR bolus in ED. Plan for repeat infectious work up, IVF hydration, IV antibiotics, kidney US, and CT A/P. Will also check CMV PCR.    Ms. Read is a 27 y.o. year old female who is status post Kidney, Pancreas Transplant - 11/3/2022  (#1).    Her maintenance immunosuppression consists of:   Immunosuppressants (From admission, onward)      Start     Stop Route Frequency Ordered    12/01/22 1800  tacrolimus  capsule 6 mg         -- Oral 2 times daily 12/01/22 1607            Hospital Course:  Interval history: No acute events overnight. Patient reports she is feeling better this am. Remains afebrile, BP improved. HR in 110's (baseline). Cont IVFs. Cr remains elevated at 3.0. Blood cxs ngtd. UA showed 11 WBCs, moderate bacteria, and 8 hyaline casts. Urine cx in process. Cont cefepime/vanc. Of note, H/H 6.5/20.0 on am labs, repeat CBC 7.2/22.5. Moncada in place for mild-mod hydro seen. Will repeat US tomorrow am. Will continue to monitor.       Past Medical, Surgical, Family, and Social History:   Unchanged from H&P.    Scheduled Meds:   aspirin  81 mg Oral Daily    atorvastatin  40 mg Oral Daily    ceFEPime (MAXIPIME) IVPB  1 g Intravenous Q12H    heparin (porcine)  5,000 Units Subcutaneous Q8H    predniSONE  5 mg Oral Daily    sodium bicarbonate  1,300 mg Oral TID    [START ON 12/5/2022] sulfamethoxazole-trimethoprim 400-80mg  1 tablet Oral Every Mon, Wed, Fri    tacrolimus  6 mg Oral BID    [START ON 12/5/2022] valGANciclovir  450 mg Oral Every Mon, Wed, Fri    venlafaxine  37.5 mg Oral Daily     Continuous Infusions:   sodium chloride 0.9% 100 mL/hr at 12/02/22 1043     PRN Meds:sodium chloride, acetaminophen, ondansetron, promethazine, sodium chloride 0.9%, traMADoL, Pharmacy to dose Vancomycin consult **AND** vancomycin - pharmacy to dose    Intake/Output - Last 3 Shifts         11/30 0700  12/01 0659 12/01 0700  12/02 0659 12/02 0700  12/03 0659    Urine (mL/kg/hr)  1030 600 (1.6)    Total Output  1030 600    Net  -1030 -600                    Review of Systems   Constitutional:  Positive for activity change, appetite change (decreased appetite), fatigue and fever. Negative for chills.   HENT: Negative.     Eyes: Negative.    Respiratory:  Negative for shortness of breath.    Cardiovascular:  Negative for chest pain and leg swelling.   Gastrointestinal:  Positive for nausea. Negative for abdominal  "distention, diarrhea and vomiting.   Endocrine: Negative.    Genitourinary:  Positive for difficulty urinating (dc in place for retention). Negative for dysuria, hematuria and urgency.   Musculoskeletal: Negative.    Skin:  Positive for wound.   Allergic/Immunologic: Positive for immunocompromised state.   Neurological:  Negative for dizziness and light-headedness.   Hematological: Negative.    Psychiatric/Behavioral:  Negative for agitation and confusion. The patient is not nervous/anxious.     Objective:     Vital Signs (Most Recent):  Temp: 98.8 °F (37.1 °C) (12/02/22 1122)  Pulse: (!) 136 (12/02/22 1122)  Resp: 18 (12/02/22 1122)  BP: 132/81 (12/02/22 1122)  SpO2: 100 % (12/02/22 1122)   Vital Signs (24h Range):  Temp:  [97.2 °F (36.2 °C)-100.4 °F (38 °C)] 98.8 °F (37.1 °C)  Pulse:  [111-136] 136  Resp:  [16-20] 18  SpO2:  [97 %-100 %] 100 %  BP: ()/(47-84) 132/81     Weight: 61.2 kg (134 lb 14.7 oz)  Height: 5' 4" (162.6 cm)  Body mass index is 23.16 kg/m².    Physical Exam  Vitals and nursing note reviewed.   Constitutional:       General: She is not in acute distress.  HENT:      Head: Normocephalic.      Nose: Nose normal.   Cardiovascular:      Rate and Rhythm: Regular rhythm. Tachycardia present.      Heart sounds: No murmur heard.    No gallop.   Pulmonary:      Effort: Pulmonary effort is normal.      Breath sounds: No wheezing or rales.   Abdominal:      General: There is no distension.      Tenderness: There is no abdominal tenderness. There is no guarding or rebound.   Musculoskeletal:         General: Normal range of motion.      Cervical back: Normal range of motion.      Right lower leg: No edema.      Left lower leg: No edema.   Skin:     General: Skin is warm and dry.   Neurological:      Mental Status: She is alert and oriented to person, place, and time.   Psychiatric:         Mood and Affect: Mood normal.         Behavior: Behavior normal.         Thought Content: Thought content " normal.         Judgment: Judgment normal.       Laboratory:  CBC:   Recent Labs   Lab 12/01/22  1630 12/02/22  0622 12/02/22  0913   WBC 14.22* 8.71 9.27   RBC 2.36* 2.12* 2.33*   HGB 7.4* 6.5* 7.2*   HCT 22.8* 20.0* 22.5*    376 378   MCV 97 94 97   MCH 31.4* 30.7 30.9   MCHC 32.5 32.7 32.0     BMP:   Recent Labs   Lab 12/01/22  0909 12/01/22  1630 12/02/22  0622    118* 89   * 131* 132*   K 4.2 4.6 3.7    105 104   CO2 22* 17* 19*   BUN 19 20 22*   CREATININE 2.8* 3.0* 3.0*   CALCIUM 10.1 9.1 8.7     Labs within the past 24 hours have been reviewed.    Diagnostic Results:  US - Kidney: Results for orders placed during the hospital encounter of 12/01/22    US Transplant Kidney With Doppler    Narrative  EXAMINATION:  US TRANSPLANT KIDNEY WITH DOPPLER    CLINICAL HISTORY:  GEORGE in kidney transplant;    TECHNIQUE:  Real time gray scale and doppler ultrasound was performed over the patient's renal allograft.    COMPARISON:  Same day CT abdomen pelvis    Ultrasound transplant kidney 11/25/2022    FINDINGS:  Renal transplant 11/02/2022.    Renal allograft in the right lower quadrant.  The allograft measures 12.2 cm. Normal perfusion. Mild to moderate hydronephrosis, new from prior.  Air noted within the collecting system and bladder similar to CT.  There is no ureteral stent.    Minimal Peritransplant free fluid.    Vasculature:    Resistive indices:    Interlobar 0.80 (previously 0.84)    Segmental upper pole 0.83 (previously 0.90)    Segmental mid pole 0.77 (previously 0.88)    Segmental lower pole 0.80 (previously 0.80)    Main renal artery peak systolic velocity: 166 cm/sec (previously 156 cm/sec) with normal waveform.    Renal artery/iliac ratio: 0.86.    The main renal vein is patent.  Increased velocity at the anastomosis measuring up to 190 cm/sec.    Impression  1. Interval removal of ureteral stent, with mild to moderate hydronephrosis new from prior.  Air noted within the collecting  system and bladder similar to same day CT and presumably related to ureteral stent removal.  2. Mild improvement in the renal arterial resistive indices; although, they remain slightly elevated, a nonspecific finding which can be seen with drug toxicity or rejection.  3. Increased velocity at the main renal vein anastomosis.  Attention on follow-up.    Electronically signed by resident: Rodríguez Tam  Date:    2022  Time:    20:26    Electronically signed by: Adrian Knox MD  Date:    2022  Time:    20:38    Assessment/Plan:     * SIRS (systemic inflammatory response syndrome)  - Blood cx x 2, ngtd  - UA showed 11 WBCs, moderate bacteria, and 8 hyaline casts  - Urine cx in process  - CMV PCR, in process   - Cont IVF hydration  - Cont bs abx (cefepime/vanc)  - CT A/P unremarkable  - Kidney US showed mild-mod hydro, dc in place  - Monitor closely      Fever  - See SIRS      Nausea & vomiting  - CT A/P without contrast unremarkable   - Continue prn antiemetics  - Continue IVF      Status post pancreas transplantation  - Amylase/lipase WNL on AM labs. Repeat ordered on admission  - Pancreas US  satisfactory  - Monitor with daily labs      Long-term use of immunosuppressant medication  - Continue prograf. Hold cellcept for GI upset. Monitor prograf level daily, monitor for toxic side effects, and adjust for therapeutic dose       Prophylactic immunotherapy  - See long term use of immunosuppressant medication      At risk for opportunistic infections  - Cont OI prophylaxis per protocol.       Status post -donor kidney transplantation  - With ongoing GEORGE  - Had ureteral stent removed 22  - Cr remains elevated at 3.0  - Kidney US showed mild-mod hydro, dc placed  - Repeat US ordered for tomorrow am 12/3      GEORGE (acute kidney injury)  - See s/p kidney transplant      Anemia due to chronic kidney disease  - H/H stable on AM labs  - Of note, H/H 6.5/20.0 on am labs, repeat CBC 7.2/22.5  -  Monitor with daily cbc      Renovascular hypertension  - Hold antihypertensives for now as with hypotension from SIRS vs dehydration  - Assess daily and restart when appropriate      Discharge Planning: Not a candidate for d/c at this time.     Palmira Vivar PA-C  Kidney Transplant  [facility] - Transplant Stepdown

## 2022-12-02 NOTE — CARE UPDATE
RAPID RESPONSE NURSE ROUND       Rounding completed with charge RNRitika for tachycardia reports no acute changes at this time. No additional concerns verbalized at this time. Instructed to call [phone] for further concerns or assistance.

## 2022-12-02 NOTE — ASSESSMENT & PLAN NOTE
- With ongoing GEORGE  - Had ureteral stent removed 11/30/22  - Cr remains elevated at 3.0  - Kidney US showed mild-mod hydro, dc placed  - Repeat US ordered for tomorrow am 12/3

## 2022-12-02 NOTE — ASSESSMENT & PLAN NOTE
- H/H stable on AM labs  - Of note, H/H 6.5/20.0 on am labs, repeat CBC 7.2/22.5  - Monitor with daily cbc

## 2022-12-02 NOTE — SUBJECTIVE & OBJECTIVE
Subjective:   History of Present Illness:  Ms. Read is a 27 y.o.  female with ESRD secondary to diabetic nephropathy and HTN, now s/p SPK 11/3/22 (Thymo induction, CMV D-/R+). Her post-op course was complicated by urinary retention requiring Moncada replacement, now resolved. She was recently admitted to the hospital and discharged 11/29/22 after presenting with nausea/vomiting, GEORGE, and had fever. Infectious work up performed. Urine cx had +multiple organisms none in predominance. Kidney US with edema/possible pyelo, ID consulted. She was initially treated with broad spectrum antibiotics. She was discharged on PO cipro x 7 days at [facility]. She now presents to the ED with complaints of ongoing nausea/vomiting, decreased appetite, intermittent abdominal pain. She had ureteral stent removed outpatient yesterday 11/30/22. She was in infusion center today to give IVF but had episode of vomiting so she was sent to the ED. She denies dysuria/urgency. She denies history of gastroparesis (has had gastric emptying study in 2020 that was normal). In ED, she is noted to be tachycardic in the 130s, blood pressure 90s, along with low grade fever 100.4. EKG shows sinus tachycardia. Creatinine remains elevated above baseline. She is receiving 1L LR bolus in ED. Plan for repeat infectious work up, IVF hydration, IV antibiotics, kidney US, and CT A/P. Will also check CMV PCR.    Ms. Read is a 27 y.o. year old female who is status post Kidney, Pancreas Transplant - 11/3/2022  (#1).    Her maintenance immunosuppression consists of:   Immunosuppressants (From admission, onward)      Start     Stop Route Frequency Ordered    12/01/22 1800  tacrolimus capsule 6 mg         -- Oral 2 times daily 12/01/22 1607            Hospital Course:  Interval history: No acute events overnight. Patient reports she is feeling better this am. Remains afebrile, BP improved. HR in 110's (baseline). Cont IVFs. Cr remains elevated at 3.0.  Blood cxs ngtd. UA showed 11 WBCs, moderate bacteria, and 8 hyaline casts. Urine cx in process. Cont cefepime/vanc. Of note, H/H 6.5/20.0 on am labs, repeat CBC 7.2/22.5. Dc in place for mild-mod hydro seen. Will repeat US tomorrow am. Will continue to monitor.       Past Medical, Surgical, Family, and Social History:   Unchanged from H&P.    Scheduled Meds:   aspirin  81 mg Oral Daily    atorvastatin  40 mg Oral Daily    ceFEPime (MAXIPIME) IVPB  1 g Intravenous Q12H    heparin (porcine)  5,000 Units Subcutaneous Q8H    predniSONE  5 mg Oral Daily    sodium bicarbonate  1,300 mg Oral TID    [START ON 12/5/2022] sulfamethoxazole-trimethoprim 400-80mg  1 tablet Oral Every Mon, Wed, Fri    tacrolimus  6 mg Oral BID    [START ON 12/5/2022] valGANciclovir  450 mg Oral Every Mon, Wed, Fri    venlafaxine  37.5 mg Oral Daily     Continuous Infusions:   sodium chloride 0.9% 100 mL/hr at 12/02/22 1043     PRN Meds:sodium chloride, acetaminophen, ondansetron, promethazine, sodium chloride 0.9%, traMADoL, Pharmacy to dose Vancomycin consult **AND** vancomycin - pharmacy to dose    Intake/Output - Last 3 Shifts         11/30 0700  12/01 0659 12/01 0700  12/02 0659 12/02 0700  12/03 0659    Urine (mL/kg/hr)  1030 600 (1.6)    Total Output  1030 600    Net  -1030 -600                    Review of Systems   Constitutional:  Positive for activity change, appetite change (decreased appetite), fatigue and fever. Negative for chills.   HENT: Negative.     Eyes: Negative.    Respiratory:  Negative for shortness of breath.    Cardiovascular:  Negative for chest pain and leg swelling.   Gastrointestinal:  Positive for nausea. Negative for abdominal distention, diarrhea and vomiting.   Endocrine: Negative.    Genitourinary:  Positive for difficulty urinating (dc in place for retention). Negative for dysuria, hematuria and urgency.   Musculoskeletal: Negative.    Skin:  Positive for wound.   Allergic/Immunologic: Positive for  "immunocompromised state.   Neurological:  Negative for dizziness and light-headedness.   Hematological: Negative.    Psychiatric/Behavioral:  Negative for agitation and confusion. The patient is not nervous/anxious.     Objective:     Vital Signs (Most Recent):  Temp: 98.8 °F (37.1 °C) (12/02/22 1122)  Pulse: (!) 136 (12/02/22 1122)  Resp: 18 (12/02/22 1122)  BP: 132/81 (12/02/22 1122)  SpO2: 100 % (12/02/22 1122)   Vital Signs (24h Range):  Temp:  [97.2 °F (36.2 °C)-100.4 °F (38 °C)] 98.8 °F (37.1 °C)  Pulse:  [111-136] 136  Resp:  [16-20] 18  SpO2:  [97 %-100 %] 100 %  BP: ()/(47-84) 132/81     Weight: 61.2 kg (134 lb 14.7 oz)  Height: 5' 4" (162.6 cm)  Body mass index is 23.16 kg/m².    Physical Exam  Vitals and nursing note reviewed.   Constitutional:       General: She is not in acute distress.  HENT:      Head: Normocephalic.      Nose: Nose normal.   Cardiovascular:      Rate and Rhythm: Regular rhythm. Tachycardia present.      Heart sounds: No murmur heard.    No gallop.   Pulmonary:      Effort: Pulmonary effort is normal.      Breath sounds: No wheezing or rales.   Abdominal:      General: There is no distension.      Tenderness: There is no abdominal tenderness. There is no guarding or rebound.   Musculoskeletal:         General: Normal range of motion.      Cervical back: Normal range of motion.      Right lower leg: No edema.      Left lower leg: No edema.   Skin:     General: Skin is warm and dry.   Neurological:      Mental Status: She is alert and oriented to person, place, and time.   Psychiatric:         Mood and Affect: Mood normal.         Behavior: Behavior normal.         Thought Content: Thought content normal.         Judgment: Judgment normal.       Laboratory:  CBC:   Recent Labs   Lab 12/01/22  1630 12/02/22  0622 12/02/22  0913   WBC 14.22* 8.71 9.27   RBC 2.36* 2.12* 2.33*   HGB 7.4* 6.5* 7.2*   HCT 22.8* 20.0* 22.5*    376 378   MCV 97 94 97   MCH 31.4* 30.7 30.9   MCHC " 32.5 32.7 32.0     BMP:   Recent Labs   Lab 12/01/22  0909 12/01/22  1630 12/02/22  0622    118* 89   * 131* 132*   K 4.2 4.6 3.7    105 104   CO2 22* 17* 19*   BUN 19 20 22*   CREATININE 2.8* 3.0* 3.0*   CALCIUM 10.1 9.1 8.7     Labs within the past 24 hours have been reviewed.    Diagnostic Results:  US - Kidney: Results for orders placed during the hospital encounter of 12/01/22    US Transplant Kidney With Doppler    Narrative  EXAMINATION:  US TRANSPLANT KIDNEY WITH DOPPLER    CLINICAL HISTORY:  GEORGE in kidney transplant;    TECHNIQUE:  Real time gray scale and doppler ultrasound was performed over the patient's renal allograft.    COMPARISON:  Same day CT abdomen pelvis    Ultrasound transplant kidney 11/25/2022    FINDINGS:  Renal transplant 11/02/2022.    Renal allograft in the right lower quadrant.  The allograft measures 12.2 cm. Normal perfusion. Mild to moderate hydronephrosis, new from prior.  Air noted within the collecting system and bladder similar to CT.  There is no ureteral stent.    Minimal Peritransplant free fluid.    Vasculature:    Resistive indices:    Interlobar 0.80 (previously 0.84)    Segmental upper pole 0.83 (previously 0.90)    Segmental mid pole 0.77 (previously 0.88)    Segmental lower pole 0.80 (previously 0.80)    Main renal artery peak systolic velocity: 166 cm/sec (previously 156 cm/sec) with normal waveform.    Renal artery/iliac ratio: 0.86.    The main renal vein is patent.  Increased velocity at the anastomosis measuring up to 190 cm/sec.    Impression  1. Interval removal of ureteral stent, with mild to moderate hydronephrosis new from prior.  Air noted within the collecting system and bladder similar to same day CT and presumably related to ureteral stent removal.  2. Mild improvement in the renal arterial resistive indices; although, they remain slightly elevated, a nonspecific finding which can be seen with drug toxicity or rejection.  3. Increased  velocity at the main renal vein anastomosis.  Attention on follow-up.    Electronically signed by resident: Rodríguez Tam  Date:    12/01/2022  Time:    20:26    Electronically signed by: Adrian Knox MD  Date:    12/01/2022  Time:    20:38

## 2022-12-02 NOTE — NURSING
Pt AAOx4, VSS, Tmax 103. Cr remains 3.0. Blood cxs ngtd. Urine cx in process. Cefepime + Vanc. H/H 7.2/22.5. Moncada in place with good U/O. Repeat US tomorrow AM. Bed in low/locked position, call light/personal belongings w/in reach, non-slip socks in place, pt remains free from falls, WCTM.

## 2022-12-02 NOTE — CARE UPDATE
RAPID RESPONSE NURSE ROUND       Rounding completed with charge RN, [place] for tachycardia, hypotension reports patient stable at this time. No additional concerns verbalized at this time. Instructed to call [phone] for further concerns or assistance.

## 2022-12-02 NOTE — HOSPITAL COURSE
Patient admitted from the ED with n/v, GEORGE, and abdominal pain (had ureteral stent removed outpatient the day before). Pancreas US satisfactory. Infectious work up revealed UTI for for E. Faecium & Diphtheroids. Blood cxs ngtd. Continued to spike fever despite IV abx. ID consulted. UTI being treated with Zosyn/luis carlos. Dc in place for mild-mod hydro on US that appeared improved on repeat imaging but on CT the bladder was full despite proper dc placement. Urology upsized dc at bedside. Cr also remains elevated above baseline. DSA's undetected. Plan for kidney biopsy 12/8. Ct A/P obtained to evaluate patients abdominal pain. Showed possible infectious colitis, typhlitis or nonspecific colopathy. C diff PCR negative. CMV PCR negative. GI pathogens pending. Pt c/o worsening diarrhea so GI was consulted, no plan to scope at this time. Lastly, hematology was consulted for a high ferritin level found during the work-up of anemia. Hyperferritinemia most likely in the setting of sepsis and inflammation. H score 68 points, with <1% probability of Hemophagocytic syndrome. Ferritin trending down.    Interval history: No acute events overnight. Cr trending down, 2.2 from 2.7. Kidney bx highly suspicious for ABMR. Plan for treatment with IVIG and repeat bx in 3-4 weeks. Dc in place for urinary retention. Discussed with urology, will keep dc in ~2 weeks and f/u with urology outpatient. CMV PCR + >30,000. On IV GCV, ok to transition to PO per ID. Cont weekly CMV PCR's. Afebrile past 24 hours. Blood and urine cxs ngtd. Pt orthostatic, will continue to hydrate. Diarrhea improved, cont prn imodium. Encourage oral intake. Encourage ambulation. VSS. Will continue to montior.

## 2022-12-02 NOTE — PLAN OF CARE
Patient AAO X 4, VSS. Standing BP. Gave Prn once for pain. Midline incision with steristripes. 1L LR bolus was given in the ED for hypotension and tachycardia. NS going at 100cc/hr for hydration. Administer IVPB antibiotics. KUS showed hydronephrosis. Post void residual was 230 on bladder scan. Placed at dc and 200cc of yellow urine came back in return. Urine specimens sent to lab. Cr. Is 2.8.  Up ambulatory, bed locked at lowest setting, yellow socks on, call bell in reach  Remains free from falls.

## 2022-12-02 NOTE — PROGRESS NOTES
Admit Note     Met with patient and mother to assess needs. Patient is a 27 y.o. single female, admitted for:  Tachycardia [R00.0]  Nausea & vomiting [R11.2]  Renovascular hypertension [I15.0]  Status post pancreas transplantation [Z94.83]  Status post -donor kidney transplantation [Z94.0]  Prophylactic immunotherapy [Z29.8]  SIRS (systemic inflammatory response syndrome) [R65.10]  Long-term use of immunosuppressant medication [Z79.60]  GEORGE (acute kidney injury) [N17.9]  Nausea and vomiting, unspecified vomiting type [R11.2]     Patient admitted from emergency department on 2022 .  At this time, patient presents as alert and oriented x 4, pleasant, good eye contact, well groomed, recall good, concentration/judgement good, average intelligence, calm, communicative, cooperative, asking and answering questions appropriately, and in high spirits.  At this time, patients caregiver presents as alert and oriented x 4, pleasant, good eye contact, well groomed, recall good, concentration/judgement good, average intelligence, calm, communicative, cooperative, and asking and answering questions appropriately.    Household/Family Systems     Patient resides with patient's mother and brother, at [address] [place] [address].  Support system includes her brother Mustapha, her mother Kiersten Garcia, and her uncle Florentin.  Patient does not have dependents that are need of being cared for.     Patients primary caregiver is Kiersten Garcia, patients mother, phone number [phone].  Confirmed patients contact information is [phone] (home);   Telephone Information:   Mobile [phone]   .    During admission, patient's caregiver plans to stay in patient's room.  Confirmed patient and patients caregivers do have access to reliable transportation.    Cognitive Status/Learning     Patient reports reading ability as 12th grade and states patient does not have difficulty with reading,  writing, seeing, hearing, comprehension, learning, and memory.  Patient reports patient learns best by visual learning.   Needed: No.   Highest education level: High School (9-12) or GED    Vocation/Disability   .  Working for Income: No  If no, reason not working: Disability  Patient is disabled due to ESRD since .  Prior to disability, patient  was employed at [employer].    Adherence     Patient reports a high level of adherence to patients health care regimen.  Adherence counseling and education provided. Patient verbalizes understanding.    Substance Use    Patient reports the following substance usage.    Tobacco: none, patient denies any use.  Alcohol: none, patient denies any use.  Illicit Drugs/Non-prescribed Medications: none, patient denies any use.  Patient states clear understanding of the potential impact of substance use.  Substance abstinence/cessation counseling, education and resources provided and reviewed.     Services Utilizing/ADLS    Infusion Service: Prior to admission, patient utilizing? no  Home Health: Prior to admission, patient utilizing? no  DME: Prior to admission, yes bpc  Pulmonary/Cardiac Rehab: Prior to admission, no  Dialysis:  Prior to admission, no  Transplant Specialty Pharmacy:  Prior to admission, yes; [facility].    Prior to admission, patient reports patient was independent with ADLS and was driving.  Patient reports patient is not able to care for self at this time due to compromised medical condition (as documented in medical record) and physical weakness. Patient indicates a willingness to care for self once medically cleared to do so.    Insurance/Medications    Insured by   Payer/Plan Subscr  Sex Relation Sub. Ins. ID Effective Group Num   1. MEDICARE - [place]* SIXTO CARNEY* 1995 Female Self [id number] 22                                    [address]   2. [place] CAR[place]* 1995 Female Self [id number]*  1/1/21 [id number]                                    [address] [address]      Primary Insurance (for UNOS reporting): Public Insurance - Medicare FFS (Fee For Service)  Secondary Insurance (for UNOS reporting): Public Insurance - Medicaid    Patient reports patient is able to obtain and afford medications at this time and at time of discharge.    Living Will/Healthcare Power of     Patient states patient does not have a LW and/or HCPA.   provided education regarding LW and HCPA and the completion of forms.    Coping/Mental Health    Patient is coping well with the aid of  family members and friends.  Patient strongly denies mental health difficulties. Patient denied any mental health concerns at this time including anxiety and depression.  educated patient on resources available both inpatient and outpatient. Patient agrees to contact transplant team with needs, questions, or concerns as they arise.     Discharge Planning    At time of discharge, patient plans to return to patient's home under the care of Kiersten Garcia.  Patients mother will transport patient.  Per rounds today, expected discharge date has not been medically determined at this time. Patient and patients caregiver  verbalize understanding and are involved in treatment planning and discharge process.    Additional Concerns     providing ongoing psychosocial support, education, resources and d/c planning as needed.  SW remains available.  remains available. Patient denies additional needs and/or concerns at this time. Patient verbalizes understanding and agreement with information reviewed, social work availability, and how to access available resources as needed.

## 2022-12-03 LAB
ADENOVIRUS: NOT DETECTED
ALBUMIN SERPL BCP-MCNC: 2.6 G/DL (ref 3.5–5.2)
AMYLASE SERPL-CCNC: 42 U/L (ref 20–110)
ANION GAP SERPL CALC-SCNC: 10 MMOL/L (ref 8–16)
BASOPHILS # BLD AUTO: 0.02 K/UL (ref 0–0.2)
BASOPHILS NFR BLD: 0.2 % (ref 0–1.9)
BLD PROD TYP BPU: NORMAL
BLOOD UNIT EXPIRATION DATE: NORMAL
BLOOD UNIT TYPE CODE: 5100
BLOOD UNIT TYPE: NORMAL
BORDETELLA PARAPERTUSSIS (IS1001): NOT DETECTED
BORDETELLA PERTUSSIS (PTXP): NOT DETECTED
BUN SERPL-MCNC: 17 MG/DL (ref 6–20)
CALCIUM SERPL-MCNC: 8.6 MG/DL (ref 8.7–10.5)
CHLAMYDIA PNEUMONIAE: NOT DETECTED
CHLORIDE SERPL-SCNC: 107 MMOL/L (ref 95–110)
CO2 SERPL-SCNC: 18 MMOL/L (ref 23–29)
CODING SYSTEM: NORMAL
CORONAVIRUS 229E, COMMON COLD VIRUS: NOT DETECTED
CORONAVIRUS HKU1, COMMON COLD VIRUS: NOT DETECTED
CORONAVIRUS NL63, COMMON COLD VIRUS: NOT DETECTED
CORONAVIRUS OC43, COMMON COLD VIRUS: NOT DETECTED
CREAT SERPL-MCNC: 2.3 MG/DL (ref 0.5–1.4)
DIFFERENTIAL METHOD: ABNORMAL
DISPENSE STATUS: NORMAL
EOSINOPHIL # BLD AUTO: 0.1 K/UL (ref 0–0.5)
EOSINOPHIL NFR BLD: 0.5 % (ref 0–8)
ERYTHROCYTE [DISTWIDTH] IN BLOOD BY AUTOMATED COUNT: 15 % (ref 11.5–14.5)
EST. GFR  (NO RACE VARIABLE): 29.1 ML/MIN/1.73 M^2
FLUBV RNA NPH QL NAA+NON-PROBE: NOT DETECTED
GLUCOSE SERPL-MCNC: 94 MG/DL (ref 70–110)
HCT VFR BLD AUTO: 21 % (ref 37–48.5)
HGB BLD-MCNC: 6.8 G/DL (ref 12–16)
HPIV1 RNA NPH QL NAA+NON-PROBE: NOT DETECTED
HPIV2 RNA NPH QL NAA+NON-PROBE: NOT DETECTED
HPIV3 RNA NPH QL NAA+NON-PROBE: NOT DETECTED
HPIV4 RNA NPH QL NAA+NON-PROBE: NOT DETECTED
HUMAN METAPNEUMOVIRUS: NOT DETECTED
IMM GRANULOCYTES # BLD AUTO: 0.09 K/UL (ref 0–0.04)
IMM GRANULOCYTES NFR BLD AUTO: 0.9 % (ref 0–0.5)
INFLUENZA A (SUBTYPES H1,H1-2009,H3): NOT DETECTED
LIPASE SERPL-CCNC: 12 U/L (ref 4–60)
LYMPHOCYTES # BLD AUTO: 0.3 K/UL (ref 1–4.8)
LYMPHOCYTES NFR BLD: 3.2 % (ref 18–48)
MAGNESIUM SERPL-MCNC: 1.6 MG/DL (ref 1.6–2.6)
MCH RBC QN AUTO: 30.4 PG (ref 27–31)
MCHC RBC AUTO-ENTMCNC: 32.4 G/DL (ref 32–36)
MCV RBC AUTO: 94 FL (ref 82–98)
MONOCYTES # BLD AUTO: 0.4 K/UL (ref 0.3–1)
MONOCYTES NFR BLD: 3.7 % (ref 4–15)
MYCOPLASMA PNEUMONIAE: NOT DETECTED
NEUTROPHILS # BLD AUTO: 9.4 K/UL (ref 1.8–7.7)
NEUTROPHILS NFR BLD: 91.5 % (ref 38–73)
NRBC BLD-RTO: 0 /100 WBC
NUM UNITS TRANS PACKED RBC: NORMAL
PHOSPHATE SERPL-MCNC: 2.5 MG/DL (ref 2.7–4.5)
PLATELET # BLD AUTO: 391 K/UL (ref 150–450)
PMV BLD AUTO: 9.9 FL (ref 9.2–12.9)
POTASSIUM SERPL-SCNC: 3.6 MMOL/L (ref 3.5–5.1)
RBC # BLD AUTO: 2.24 M/UL (ref 4–5.4)
RESPIRATORY INFECTION PANEL SOURCE: NORMAL
RSV RNA NPH QL NAA+NON-PROBE: NOT DETECTED
RV+EV RNA NPH QL NAA+NON-PROBE: NOT DETECTED
SARS-COV-2 RNA RESP QL NAA+PROBE: NOT DETECTED
SODIUM SERPL-SCNC: 135 MMOL/L (ref 136–145)
TACROLIMUS BLD-MCNC: 18 NG/ML (ref 5–15)
VANCOMYCIN SERPL-MCNC: 21.5 UG/ML
WBC # BLD AUTO: 10.25 K/UL (ref 3.9–12.7)

## 2022-12-03 PROCEDURE — 36415 COLL VENOUS BLD VENIPUNCTURE: CPT | Performed by: INTERNAL MEDICINE

## 2022-12-03 PROCEDURE — 85025 COMPLETE CBC W/AUTO DIFF WBC: CPT | Performed by: PHYSICIAN ASSISTANT

## 2022-12-03 PROCEDURE — 25000003 PHARM REV CODE 250: Performed by: PHYSICIAN ASSISTANT

## 2022-12-03 PROCEDURE — 80069 RENAL FUNCTION PANEL: CPT | Performed by: PHYSICIAN ASSISTANT

## 2022-12-03 PROCEDURE — 20600001 HC STEP DOWN PRIVATE ROOM

## 2022-12-03 PROCEDURE — 36415 COLL VENOUS BLD VENIPUNCTURE: CPT | Performed by: PHYSICIAN ASSISTANT

## 2022-12-03 PROCEDURE — 63600175 PHARM REV CODE 636 W HCPCS: Performed by: PHYSICIAN ASSISTANT

## 2022-12-03 PROCEDURE — P9016 RBC LEUKOCYTES REDUCED: HCPCS | Performed by: PHYSICIAN ASSISTANT

## 2022-12-03 PROCEDURE — 63600175 PHARM REV CODE 636 W HCPCS: Performed by: INTERNAL MEDICINE

## 2022-12-03 PROCEDURE — 36430 TRANSFUSION BLD/BLD COMPNT: CPT

## 2022-12-03 PROCEDURE — 99233 PR SUBSEQUENT HOSPITAL CARE,LEVL III: ICD-10-PCS | Mod: GC,,, | Performed by: INTERNAL MEDICINE

## 2022-12-03 PROCEDURE — 80197 ASSAY OF TACROLIMUS: CPT | Performed by: PHYSICIAN ASSISTANT

## 2022-12-03 PROCEDURE — 99233 SBSQ HOSP IP/OBS HIGH 50: CPT | Mod: GC,,, | Performed by: INTERNAL MEDICINE

## 2022-12-03 PROCEDURE — 99223 PR INITIAL HOSPITAL CARE,LEVL III: ICD-10-PCS | Mod: GC,,, | Performed by: INTERNAL MEDICINE

## 2022-12-03 PROCEDURE — 87798 DETECT AGENT NOS DNA AMP: CPT | Performed by: STUDENT IN AN ORGANIZED HEALTH CARE EDUCATION/TRAINING PROGRAM

## 2022-12-03 PROCEDURE — 99223 1ST HOSP IP/OBS HIGH 75: CPT | Mod: GC,,, | Performed by: INTERNAL MEDICINE

## 2022-12-03 PROCEDURE — 83690 ASSAY OF LIPASE: CPT | Performed by: PHYSICIAN ASSISTANT

## 2022-12-03 PROCEDURE — 83735 ASSAY OF MAGNESIUM: CPT | Performed by: PHYSICIAN ASSISTANT

## 2022-12-03 PROCEDURE — 25000003 PHARM REV CODE 250: Performed by: INTERNAL MEDICINE

## 2022-12-03 PROCEDURE — 94761 N-INVAS EAR/PLS OXIMETRY MLT: CPT

## 2022-12-03 PROCEDURE — 82150 ASSAY OF AMYLASE: CPT | Performed by: PHYSICIAN ASSISTANT

## 2022-12-03 PROCEDURE — 80202 ASSAY OF VANCOMYCIN: CPT | Performed by: INTERNAL MEDICINE

## 2022-12-03 RX ORDER — CEFEPIME HYDROCHLORIDE 1 G/50ML
1 INJECTION, SOLUTION INTRAVENOUS
Status: DISCONTINUED | OUTPATIENT
Start: 2022-12-03 | End: 2022-12-04

## 2022-12-03 RX ORDER — HYDROCODONE BITARTRATE AND ACETAMINOPHEN 500; 5 MG/1; MG/1
TABLET ORAL
Status: DISCONTINUED | OUTPATIENT
Start: 2022-12-03 | End: 2022-12-05

## 2022-12-03 RX ADMIN — SODIUM BICARBONATE 650 MG TABLET 1300 MG: at 02:12

## 2022-12-03 RX ADMIN — ACETAMINOPHEN 650 MG: 325 TABLET ORAL at 12:12

## 2022-12-03 RX ADMIN — TACROLIMUS 6 MG: 5 CAPSULE ORAL at 10:12

## 2022-12-03 RX ADMIN — VANCOMYCIN HYDROCHLORIDE 500 MG: 500 INJECTION, POWDER, LYOPHILIZED, FOR SOLUTION INTRAVENOUS at 01:12

## 2022-12-03 RX ADMIN — CEFEPIME HYDROCHLORIDE 1 G: 1 INJECTION, POWDER, FOR SOLUTION INTRAMUSCULAR; INTRAVENOUS at 04:12

## 2022-12-03 RX ADMIN — ASPIRIN 81 MG: 81 TABLET, COATED ORAL at 10:12

## 2022-12-03 RX ADMIN — ATORVASTATIN CALCIUM 40 MG: 40 TABLET, FILM COATED ORAL at 10:12

## 2022-12-03 RX ADMIN — DAPTOMYCIN 625 MG: 350 INJECTION, POWDER, LYOPHILIZED, FOR SOLUTION INTRAVENOUS at 02:12

## 2022-12-03 RX ADMIN — SODIUM BICARBONATE 650 MG TABLET 1300 MG: at 08:12

## 2022-12-03 RX ADMIN — VENLAFAXINE HYDROCHLORIDE 37.5 MG: 37.5 CAPSULE, EXTENDED RELEASE ORAL at 10:12

## 2022-12-03 RX ADMIN — PREDNISONE 5 MG: 5 TABLET ORAL at 10:12

## 2022-12-03 RX ADMIN — CEFEPIME HYDROCHLORIDE 1 G: 1 INJECTION, POWDER, FOR SOLUTION INTRAMUSCULAR; INTRAVENOUS at 05:12

## 2022-12-03 RX ADMIN — SODIUM BICARBONATE 650 MG TABLET 1300 MG: at 10:12

## 2022-12-03 RX ADMIN — SODIUM CHLORIDE: 0.9 INJECTION, SOLUTION INTRAVENOUS at 06:12

## 2022-12-03 NOTE — PROGRESS NOTES
Pharmacokinetic Assessment Follow Up: IV Vancomycin    Vancomycin serum concentration assessment(s):     The random level was drawn correctly and can be used to guide therapy at this time. The measurement is above the desired definitive target range of 15 to 20 mcg/mL.  Renal function improving.      Vancomycin Regimen Plan:     Vancomycin 500 mg x 1 at 1300 on 12/2.    Re-dose when the random level is less than 20 mcg/mL, next level to be drawn with am labs on 12/4    Drug levels (last 3 results):  Recent Labs   Lab Result Units 12/02/22  1702 12/03/22  0811   Vancomycin, Random ug/mL 18.7 21.5       Pharmacy will continue to follow and monitor vancomycin.    Please contact pharmacy at extension [phone] for questions regarding this assessment.    Thank you for the consult,   Gurmeet Menjivar       Patient brief summary:  Isabela Read is a 27 y.o. female initiated on antimicrobial therapy with IV Vancomycin for treatment of urinary tract infection    Drug Allergies:   Review of patient's allergies indicates:  No Known Allergies    Actual Body Weight:   62 kg    Renal Function:   Estimated Creatinine Clearance: 31.7 mL/min (A) (based on SCr of 2.3 mg/dL (H)).,     Dialysis Method (if applicable):  N/A    CBC (last 72 hours):  Recent Labs   Lab Result Units 12/01/22  0909 12/01/22  1630 12/02/22  0622 12/02/22  0913 12/03/22  0622   WBC K/uL 11.09 14.22* 8.71 9.27 10.25   Hemoglobin g/dL 9.5* 7.4* 6.5* 7.2* 6.8*   Hematocrit % 29.2* 22.8* 20.0* 22.5* 21.0*   Platelets K/uL 539* 414 376 378 391   Gran % % 93.8* 94.3* 88.5* 88.7* 91.5*   Lymph % % 3.2* 2.2* 5.5* 5.8* 3.2*   Mono % % 1.4* 1.9* 4.1 3.6* 3.7*   Eosinophil % % 0.9 0.4 1.0 0.8 0.5   Basophil % % 0.1 0.1 0.1 0.1 0.2   Differential Method  Automated Automated Automated Automated Automated       Metabolic Panel (last 72 hours):  Recent Labs   Lab Result Units 12/01/22  0909 12/01/22  1630 12/01/22  2319 12/02/22  0622 12/03/22  0622   Sodium mmol/L 134*  131*  --  132* 135*   Potassium mmol/L 4.2 4.6  --  3.7 3.6   Chloride mmol/L 103 105  --  104 107   CO2 mmol/L 22* 17*  --  19* 18*   Glucose mg/dL 106 118*  --  89 94   Glucose, UA   --   --  Negative  --   --    BUN mg/dL 19 20  --  22* 17   Creatinine mg/dL 2.8* 3.0*  --  3.0* 2.3*   Albumin g/dL 3.2*  3.2* 2.7*  --  2.6* 2.6*   Total Bilirubin mg/dL 0.3 0.3  --   --   --    Alkaline Phosphatase U/L 97 77  --   --   --    AST U/L 10 17  --   --   --    ALT U/L 10 10  --   --   --    Magnesium mg/dL  --  1.6  --  1.4* 1.6   Phosphorus mg/dL 1.9* 1.9*  --  4.6* 2.5*       Vancomycin Administrations:  vancomycin given in the last 96 hours                     vancomycin 500 mg in dextrose 5 % 100 mL IVPB (ready to mix system) (mg) 500 mg New Bag 12/02/22 2228    vancomycin 1.25 g in dextrose 5% 250 mL IVPB (ready to mix) (mg) 1,250 mg New Bag 12/01/22 2015                    Microbiologic Results:  Microbiology Results (last 7 days)       Procedure Component Value Units Date/Time    Urine culture [[id number]] Collected: 12/01/22 2319    Order Status: Completed Specimen: Urine Updated: 12/03/22 0810     Urine Culture, Routine Multiple organisms isolated. None in predominance.  Repeat if      clinically necessary.    Narrative:      Specimen Source->Urine    Blood culture - site #2 [[id number]] Collected: 12/01/22 1652    Order Status: Completed Specimen: Blood from Peripheral, Hand, Right Updated: 12/02/22 1812     Blood Culture, Routine No Growth to date      No Growth to date    Blood culture - site #1 [[id number]] Collected: 12/01/22 1632    Order Status: Completed Specimen: Blood from Peripheral, Wrist, Right Updated: 12/02/22 1812     Blood Culture, Routine No Growth to date      No Growth to date    Urine culture [[id number]] Collected: 12/01/22 2320    Order Status: Canceled Specimen: Urine

## 2022-12-03 NOTE — PROGRESS NOTES
[facility]  Kidney Transplant  Progress Note      Reason for Follow-up: Reassessment of Kidney, Pancreas Transplant - 11/3/2022  (#1) recipient and management of immunosuppression.    ORGAN:  RIGHT KIDNEY   Donor Type:  Donation after Brain Death         Subjective:   History of Present Illness:  Ms. Read is a 27 y.o.  female with ESRD secondary to diabetic nephropathy and HTN, now s/p SPK 11/3/22 (Thymo induction, CMV D-/R+). Her post-op course was complicated by urinary retention requiring Moncada replacement, now resolved. She was recently admitted to the hospital and discharged 11/29/22 after presenting with nausea/vomiting, GEORGE, and had fever. Infectious work up performed. Urine cx had +multiple organisms none in predominance. Kidney US with edema/possible pyelo, ID consulted. She was initially treated with broad spectrum antibiotics. She was discharged on PO cipro x 7 days at [facility]. She now presents to the ED with complaints of ongoing nausea/vomiting, decreased appetite, intermittent abdominal pain. She had ureteral stent removed outpatient yesterday 11/30/22. She was in infusion center today to give IVF but had episode of vomiting so she was sent to the ED. She denies dysuria/urgency. She denies history of gastroparesis (has had gastric emptying study in 2020 that was normal). In ED, she is noted to be tachycardic in the 130s, blood pressure 90s, along with low grade fever 100.4. EKG shows sinus tachycardia. Creatinine remains elevated above baseline. She is receiving 1L LR bolus in ED. Plan for repeat infectious work up, IVF hydration, IV antibiotics, kidney US, and CT A/P. Will also check CMV PCR.    Ms. Read is a 27 y.o. year old female who is status post Kidney, Pancreas Transplant - 11/3/2022  (#1).    Her maintenance immunosuppression consists of:   Immunosuppressants (From admission, onward)      None            Hospital Course:  Interval history: No acute events  overnight. Patient reports she is feeling better this am. Remains afebrile, BP improved. HR in 110's (baseline). Cont IVFs. Cr remains elevated at 3.0. Blood cxs ngtd. UA showed 11 WBCs, moderate bacteria, and 8 hyaline casts. Urine cx in process. Cont cefepime/vanc. Of note, H/H 6.5/20.0 on am labs, repeat CBC 7.2/22.5. Dc in place for mild-mod hydro seen. Will repeat US tomorrow am. Will continue to monitor.     Interval History:  Complained of leakage around dc overnight. Seems to have been kinked because it functioned well in the room. KUS with improved hydro    Past Medical, Surgical, Family, and Social History:   Unchanged from H&P.    Scheduled Meds:   aspirin  81 mg Oral Daily    atorvastatin  40 mg Oral Daily    ceFEPime (MAXIPIME) IVPB  1 g Intravenous Q12H    DAPTOmycin (CUBICIN) IV  10 mg/kg Intravenous Q24H    heparin (porcine)  5,000 Units Subcutaneous Q8H    predniSONE  5 mg Oral Daily    sodium bicarbonate  1,300 mg Oral TID    [START ON 12/5/2022] sulfamethoxazole-trimethoprim 400-80mg  1 tablet Oral Every Mon, Wed, Fri    [START ON 12/5/2022] valGANciclovir  450 mg Oral Every Mon, Wed, Fri    venlafaxine  37.5 mg Oral Daily     Continuous Infusions:   sodium chloride 0.9% Stopped (12/03/22 0800)     PRN Meds:sodium chloride, sodium chloride, acetaminophen, ondansetron, promethazine, sodium chloride 0.9%, traMADoL    Intake/Output - Last 3 Shifts         12/01 0700  12/02 0659 12/02 0700 12/03 0659 12/03 0700  12/04 0659    P.O.  300     I.V. (mL/kg)   3049.5 (48.7)    Blood   350    IV Piggyback  99.3     Total Intake(mL/kg)  399.3 (6.4) 3399.5 (54.3)    Urine (mL/kg/hr) 1030 3350 (2.2) 1400 (2.6)    Stool  0     Total Output 1030 3350 1400    Net -1030 -2950.7 +1999.5           Stool Occurrence  1 x              Review of Systems   Constitutional:  Positive for fever.   HENT: Negative.     Eyes: Negative.    Respiratory: Negative.     Cardiovascular: Negative.   "  Gastrointestinal: Negative.    Endocrine: Negative.    Genitourinary:  Positive for decreased urine volume.   Musculoskeletal: Negative.    Skin: Negative.    Neurological: Negative.    Psychiatric/Behavioral: Negative.      Objective:     Vital Signs (Most Recent):  Temp: 99.4 °F (37.4 °C) (12/03/22 1320)  Pulse: (!) 122 (12/03/22 1320)  Resp: 19 (12/03/22 1320)  BP: (!) 103/57 (12/03/22 1320)  SpO2: 100 % (12/03/22 1320)   Vital Signs (24h Range):  Temp:  [98 °F (36.7 °C)-100.7 °F (38.2 °C)] 99.4 °F (37.4 °C)  Pulse:  [120-140] 122  Resp:  [12-20] 19  SpO2:  [96 %-100 %] 100 %  BP: ()/(55-86) 103/57     Weight: 62.6 kg (138 lb 0.1 oz)  Height: 5' 4" (162.6 cm)  Body mass index is 23.69 kg/m².    Physical Exam  Vitals and nursing note reviewed.   Constitutional:       General: She is not in acute distress.  Eyes:      General: No scleral icterus.  Cardiovascular:      Rate and Rhythm: Normal rate.   Pulmonary:      Effort: Pulmonary effort is normal. No respiratory distress.   Abdominal:      General: There is no distension.      Palpations: Abdomen is soft.   Musculoskeletal:      Right lower leg: No edema.      Left lower leg: No edema.   Skin:     Coloration: Skin is not jaundiced.   Neurological:      Mental Status: She is alert.       Laboratory:  Labs within the past 24 hours have been reviewed.    Diagnostic Results:  US - Kidney: Results for orders placed during the hospital encounter of 12/01/22    US Transplant Kidney With Doppler    Narrative  EXAMINATION:  US TRANSPLANT KIDNEY WITH DOPPLER    CLINICAL HISTORY:  s/p spk; f/u mild-moderate hydro;    TECHNIQUE:  Transplant renal ultrasound of the right lower quadrant with color flow doppler and duplex analysis.    COMPARISON:  Transplant renal ultrasound 12/01/2022.  CT abdomen pelvis 12/01/2022.    FINDINGS:  Status post right lower quadrant renal transplant on 11/03/2022.  The renal transplant measures 12.2 cm and demonstrates no focal " "parenchymal abnormality. Mild transplant hydronephrosis, decreased compared to prior exam..  No peritransplant fluid.    Renal arterial resistive indices are as follows: Interlobar 0.81 (previously 0.80), segmental Sup 0.72 (previously 0.83), segmental Mid previously 0.78 (0.77), segmental Low previously 0.80 (0.80).  There is no evidence of a tardus parvus waveform. The maximum velocity in the main renal artery is 98.1 (previously 166) cm/sec.    Renal veins are patent.  There is a slightly elevated velocity at the renal vein anastomosis measuring 115 centimeters/second (previously 190 centimeters/second).    The renal transplant venous system is unremarkable.    Impression  1. Interval decrease in transplant hydronephrosis.  Interval improvement of main renal artery velocity.  2. Intraparenchymal resistive indices are slightly elevated but stable compared to prior exam.  3. Improvement in renal vein anastomosis velocity which remains somewhat elevated.  Continued follow-up is recommended.    Electronically signed by resident: Laura Sifuentes  Date:    12/03/2022  Time:    09:34    Electronically signed by: Zulma Anaya MD  Date:    12/03/2022  Time:    10:36    Assessment/Plan:     * SIRS (systemic inflammatory response syndrome)  - Blood cx x 2, ngtd  - UA showed 11 WBCs, moderate bacteria, and 8 hyaline casts  - Urine cx with enterococcus  - CMV PCR, in process   - CT A/P unremarkable  - Kidney US showed mild-mod hydro, dc in place  - see "pyelonephritis"      Fever  - See SIRS      Nausea & vomiting  - CT A/P without contrast unremarkable   - Continue prn antiemetics  - Continue IVF      Status post pancreas transplantation  - Amylase/lipase WNL on AM labs. Repeat ordered on admission  - Pancreas US 11/25 satisfactory  - Monitor with daily labs      Pyelonephritis, acute  With GEORGE  UC now predominant for enterococcus  On dapto and cefepime  ID following      Long-term use of immunosuppressant " medication  - Continue prograf. Hold cellcept for GI upset. Monitor prograf level daily, monitor for toxic side effects, and adjust for therapeutic dose       Prophylactic immunotherapy  - See long term use of immunosuppressant medication      At risk for opportunistic infections  - Cont OI prophylaxis per protocol.         Status post -donor kidney transplantation  - With ongoing GEORGE  - Had ureteral stent removed 22  - Kidney US showed mild-mod hydro, dc placed  - Repeat US 12/3 with improved hydro and creatinine improved as well      GEORGE (acute kidney injury)  - See s/p kidney transplant      Anemia due to chronic kidney disease  - H/H stable on AM labs  - currently menstruating  - given 1u prbc    Renovascular hypertension  - Hold antihypertensives for now as with hypotension from SIRS vs dehydration  - Assess daily and restart when appropriate              Adam Pettit Jr., MD  Kidney Transplant  [facility]

## 2022-12-03 NOTE — CONSULTS
[facility] Consult Note    Primary Attending Physician: Elmira Celis MD  Consultant Attending: Mallory Beckwith MD  Consultant Fellow: Victor Hugo Carbajal MD    Assessment/Plan:     Isabela Read is a 27 y.o.  female with ESRD secondary to diabetic nephropathy and HTN, now s/p SPK 11/3/22 (Thymo induction, CMV D-/R+). Her post-op course was complicated by urinary retention requiring Moncada replacement, now resolved. She was recently admitted to the hospital and discharged 11/29/22 after presenting with nausea/vomiting, GEORGE, and had fever. Infectious work up performed. Urine cx had +multiple organisms none in predominance. Kidney US with edema/possible pyelo, ID consulted. She was initially treated with broad spectrum antibiotics. She was discharged on PO cipro x 7 days at dc. 12/1 presentation of complaints of ongoing nausea/vomiting, decreased appetite, intermittent abdominal pain. She had ureteral stent removed outpatient 11/30/22.    12/1 bcx ngtd  12/1 CMV pcr negative    Concern for UTI in KTX pt  -12/1 CT AP noted for air w/in allograft, perhaps related to interval removal of stent as per read  -continue cefepime, dc vanc and change to daptomycin  -notified the micro lab to w/u 12/1 polymicrobial UA  -obtain respiratory infection panel    Thank you for allowing us to participate in the care of this patient. Please contact me if you have any questions regarding this consult.    Victor Hugo Carbajal MD  [facility]  Cell: [phone]    Reason for Consult:     kidney transplant with fever    Subjective:      History of Present Illness:  Isabela Read is a 27 y.o.  female with ESRD secondary to diabetic nephropathy and HTN, now s/p SPK 11/3/22 (Thymo induction, CMV D-/R+). Her post-op course was complicated by urinary retention requiring Moncada replacement, now resolved. She was recently admitted to the hospital and discharged 11/29/22  after presenting with nausea/vomiting, GEORGE, and had fever. Infectious work up performed. Urine cx had +multiple organisms none in predominance. Kidney US with edema/possible pyelo, ID consulted. She was initially treated with broad spectrum antibiotics. She was discharged on PO cipro x 7 days at [facility].     12/1 presentation of complaints of ongoing nausea/vomiting, decreased appetite, intermittent abdominal pain. She had ureteral stent removed outpatient 11/30/22.    Initial T 100.4, Tmax in 24h 103, satting RA, normotensive. WBC trend overall improving (14.22, 9.27, 10.25). GEORGE noted to be improving. UA hazy, nitrate neg, LE trace, moderate bacteria.    Past Medical History:  Past Medical History:   Diagnosis Date    Acute kidney injury superimposed on chronic kidney disease 4/7/2021    Anemia     Chronic hypertension with exacerbation during pregnancy in second trimester 11/6/2020    Current regimen (11/6/20):  - Carvedilol 12.5 mg BID - Nifedipine 30 mg daily Baseline CKD + proteinuria    Chronic kidney disease     Depression     Diabetes mellitus     Diabetic retinopathy     Diarrhea     Encounter for blood transfusion     End stage renal failure on dialysis     Gastroparesis     Glaucoma     Hepatomegaly 4/29/2021    Hx of psychiatric care     Hyperlipidemia     Hypertension     Nephrotic syndrome     Nephrotic syndrome 3/4/2021    Nonspecific reaction to tuberculin skin test without active tuberculosis 10/1/2021    Palpitations     Poor fetal growth affecting management of mother in second trimester 10/15/2020    Restrictive lung disease     Retinal detachment     Severe pre-eclampsia in second trimester 11/6/2020    Type 1 diabetes mellitus with end-stage renal disease (ESRD) 2/24/2015    Followed by Dr. Mayo.  Last A1C was 13  Currently on lantus 20 units qAM and humolog 10 units with meals  - a fetal echocardiogram around 22-24 weeks - needs eye exam, podiatry exam  - needs EKG, maternal echo and fu with  cardiology  - ASA 81mg, folic acid 4mg PO daily - hemoglobin A1c every 4-6 weeks -24 hour urine for protein and creatinine clearance should be performed. Patient will also need a       Past Surgical History:  Past Surgical History:   Procedure Laterality Date    AV FISTULA PLACEMENT Left 04/07/2021    Procedure: CREATION, AV FISTULA;  Surgeon: Roberto Ryan MD;  Location: [facility] OR [address] [place] [address];  Service: Peripheral Vascular;  Laterality: Left;    COLONOSCOPY N/A 03/16/2022    Procedure: COLONOSCOPY;  Surgeon: Tavo Kwok MD;  Location: [facility] ([address] [place] [address]);  Service: Endoscopy;  Laterality: N/A;  Questionable history of delayed gastric emptying longstanding diabetes now on eating pre transplant workup for history of nausea vomiting which seems to have improved with dialysis also chronic diarrhea and history of anemia pre transplant workup for kidney transplant. 3    CYSTOSCOPY      ESOPHAGOGASTRODUODENOSCOPY N/A 03/16/2022    Procedure: EGD (ESOPHAGOGASTRODUODENOSCOPY);  Surgeon: Tavo Kwok MD;  Location: [facility] ([address] [place] [address]);  Service: Endoscopy;  Laterality: N/A;  Questionable history of delayed gastric emptying longstanding diabetes now on eating pre transplant workup for history of nausea vomiting which seems to have improved with dialysis also chronic diarrhea and history of anemia pre transplant workup for    FISTULOGRAM N/A 08/11/2021    Procedure: Fistulogram;  Surgeon: Roberto Ryan MD;  Location: [facility] CATH LAB;  Service: Cardiology;  Laterality: N/A;    KIDNEY TRANSPLANT Right 11/02/2022    Procedure: TRANSPLANT, KIDNEY;  Surgeon: Kumar Rodriges Jr., MD;  Location: [address];  Service: Transplant;  Laterality: Right;    LASER PHOTOCOAGULATION OF RETINA Right 05/31/2022    Procedure: PHOTOCOAGULATION, RETINA, USING LASER;  Surgeon: Maximilian Montaño MD;  Location: [facility] OR 1ST FLR;  Service: Ophthalmology;  Laterality: Right;    PERCUTANEOUS TRANSLUMINAL ANGIOPLASTY OF  ARTERIOVENOUS FISTULA N/A 08/11/2021    Procedure: PTA, AV FISTULA;  Surgeon: Roberto Ryan MD;  Location: [facility] CATH LAB;  Service: Cardiology;  Laterality: N/A;    REMOVAL IMPLANT, POSTERIOR SEGMENT, INTRAOCULAR Right 02/01/2022    Procedure: REMOVAL IMPLANT, POSTERIOR SEGMENT, INTRAOCULAR;  Surgeon: Maximilian Montaño MD;  Location: [facility] OR 1ST FLR;  Service: Ophthalmology;  Laterality: Right;    REPAIR OF RETINAL DETACHMENT WITH VITRECTOMY Right 01/25/2022    Procedure: REPAIR, RETINAL DETACHMENT, WITH VITRECTOMY, MEMBRANE PEEL, LASER, INJECTION OF GAS VS OIL;  Surgeon: Maximilian Montaño MD;  Location: [facility] OR 1ST FLR;  Service: Ophthalmology;  Laterality: Right;    REVISION OF ARTERIOVENOUS FISTULA Left 12/10/2021    Procedure: REVISION, AV FISTULA with BVT;  Surgeon: Roberto Ryan MD;  Location: [facility] OR 2ND FLR;  Service: Peripheral Vascular;  Laterality: Left;    TRANSPLANTATION OF PANCREAS N/A 11/02/2022    Procedure: TRANSPLANT, PANCREAS;  Surgeon: Kumar Rodriges Jr., MD;  Location: [facility] OR 2ND FLR;  Service: Transplant;  Laterality: N/A;    VITRECTOMY BY PARS PLANA APPROACH Right 02/01/2022    Procedure: VITRECTOMY, PARS PLANA APPROACH;  Surgeon: Maximilian Montaño MD;  [facility];  Service: Ophthalmology;  Laterality: Right;    VITRECTOMY BY PARS PLANA APPROACH Right 05/31/2022    Procedure: VITRECTOMY, PARS PLANA APPROACH;  Surgeon: Maximilian Montaño MD;  [facility];  Service: Ophthalmology;  Laterality: Right;       Allergies:  Review of patient's allergies indicates:  No Known Allergies    Medications:   Home Medications:  Prior to Admission medications    Medication Sig Start Date End Date Taking? Authorizing Provider   aspirin (ECOTRIN) 81 MG EC tablet Take 81 mg by mouth once daily.   Yes Historical Provider   ciprofloxacin HCl (CIPRO) 500 MG tablet Take 1 tablet (500 mg total) by mouth every 12 (twelve) hours. STOP 12/5/22 11/28/22  Yes  Elmira Celis MD   ergocalciferol (ERGOCALCIFEROL) 50,000 unit Cap Take 1 capsule (50,000 Units total) by mouth every 7 days. 11/11/22  Yes Kumar Rodriges Jr., MD   magnesium oxide (MAG-OX) 400 mg (241.3 mg magnesium) tablet Take 1 tablet (400 mg total) by mouth 2 (two) times daily. 11/10/22 11/10/23 Yes Tammie Broussard,    metoprolol tartrate (LOPRESSOR) 25 MG tablet Take 1 tablet (25 mg total) by mouth 2 (two) times daily. HOLD SBP <120 or HR <60 11/28/22  Yes Elmira Celis MD   NIFEdipine (PROCARDIA-XL) 30 MG (OSM) 24 hr tablet Take 1 tablet (30 mg total) by mouth once daily. HOLD FOR SBP <140 11/28/22  Yes Elmira Celis MD   ondansetron (ZOFRAN-ODT) 8 MG TbDL DISSOLVE 1 tablet (8 mg total) by mouth every 8 (eight) hours as needed (nausea). 11/8/22  Yes Abby Mccord DNP   predniSONE (DELTASONE) 5 MG tablet From 11/6 to 11/12 take 20mg by mouth once daily;  From 11/13 to 11/19 take 15mg daily;  From 11/20 to 11/26 take 10mg daily;  From 11/27/22 start 5mg daily thereafter  Patient taking differently: Take 5 mg by mouth once daily. From 11/6 to 11/12 take 20mg by mouth once daily;  From 11/13 to 11/19 take 15mg daily;  From 11/20 to 11/26 take 10mg daily;  From 11/27/22 start 5mg daily thereafter 11/3/22  Yes Kumar Rodriges Jr., MD   rosuvastatin (CRESTOR) 10 MG tablet Take 1 tablet (10 mg total) by mouth every evening. 11/8/22  Yes Kumar Rodriges Jr., MD   sodium bicarbonate 650 MG tablet Take 2 tablets (1,300 mg total) by mouth 3 (three) times daily. 11/14/22  Yes Tammie Broussard DO   sulfamethoxazole-trimethoprim 400-80mg (BACTRIM,SEPTRA) 400-80 mg per tablet Take 1 tablet by mouth once daily. Stop 5/2/2023 11/3/22 5/2/23 Yes Kumar Rodriges Jr., MD   tacrolimus (PROGRAF) 1 MG Cap Take 6 capsules (6 mg total) by mouth every morning AND 6 capsules (6 mg total) every evening. 12/1/22  Yes Tammie Broussard DO   valGANciclovir (VALCYTE) 450 mg Tab Take 1 tablet (450 mg  total) by mouth once daily. Stop 2/2/2023 11/28/22  Yes Elmira Celis MD   venlafaxine (EFFEXOR XR) 37.5 MG 24 hr capsule Take 1 capsule (37.5 mg total) by mouth once daily. 11/8/22 11/8/23 Yes Kumar Rodriges Jr., MD   famotidine (PEPCID) 20 MG tablet Take 1 tablet (20 mg total) by mouth once daily. Stop 11/27/22 for 19 days 11/8/22 11/27/22  Kumar Rodriges Jr., MD   k phos di & mono-sod phos mono (K-PHOS-NEUTRAL) 250 mg Tab Take 1 tablet by mouth 2 (two) times a day. 12/1/22 12/1/23  Tammie Broussard DO   multivitamin (THERAGRAN) per tablet Take 1 tablet by mouth once daily. 11/8/22   Kumar Rodriges Jr., MD   mycophenolate (CELLCEPT) 250 mg Cap Take 2 capsules (500 mg total) by mouth 2 (two) times daily. RESTART 12/6/22 11/30/22   Tammie Broussard DO   promethazine (PHENERGAN) 12.5 MG Tab Take 1 tablet (12.5 mg total) by mouth every 6 (six) hours as needed (nausea). 11/30/22   Tammie Broussard DO   promethazine (PHENERGAN) 25 MG suppository Place 1 suppository (25 mg total) rectally every 6 (six) hours as needed for Nausea. 11/30/22   Tammie Broussard DO       Family History:  Family History   Problem Relation Age of Onset    Hypertension Mother     Heart disease Father     Diabetes Brother     Celiac disease Neg Hx     Cirrhosis Neg Hx     Colon cancer Neg Hx     Colon polyps Neg Hx     Crohn's disease Neg Hx     Inflammatory bowel disease Neg Hx     Liver cancer Neg Hx     Liver disease Neg Hx     Rectal cancer Neg Hx     Stomach cancer Neg Hx     Ulcerative colitis Neg Hx     Esophageal cancer Neg Hx     Hemochromatosis Neg Hx     Pancreatic cancer Neg Hx     Kidney cancer Neg Hx     Bladder Cancer Neg Hx     Uterine cancer Neg Hx     Ovarian cancer Neg Hx        Social History:  Social History     Tobacco Use    Smoking status: Never    Smokeless tobacco: Never   Substance Use Topics    Alcohol use: No    Drug use: No       Review of Systems   Constitutional:  Positive for fatigue and fever.  Negative for chills.   HENT: Negative.     Eyes: Negative.    Respiratory:  Negative for shortness of breath.    Cardiovascular:  Negative for chest pain and leg swelling.   Gastrointestinal:  Positive for nausea. Negative for abdominal distention, diarrhea and vomiting.   Endocrine: Negative.    Allergic/Immunologic: Positive for immunocompromised state.   Neurological:  Negative for dizziness and light-headedness.   Hematological: Negative.    Psychiatric/Behavioral:  Negative for agitation and confusion. The patient is not nervous/anxious.      Objective:   Last 24 Hour Vital Signs:  BP  Min: 96/55  Max: 134/82  Temp  Av.9 °F (37.7 °C)  Min: 98 °F (36.7 °C)  Max: 103 °F (39.4 °C)  Pulse  Av.6  Min: 120  Max: 136  Resp  Avg: 15  Min: 12  Max: 18  SpO2  Av %  Min: 96 %  Max: 99 %  Weight  Av.6 kg (138 lb 0.1 oz)  Min: 62.6 kg (138 lb 0.1 oz)  Max: 62.6 kg (138 lb 0.1 oz)  I/O last 3 completed shifts:  In: 399.3 [P.O.:300; IV Piggyback:99.3]  Out: 4380 [Urine:4380]    Physical Exam  Vitals and nursing note reviewed.   Constitutional:       General: She is not in acute distress.  HENT:      Head: Normocephalic.      Nose: Nose normal.   Cardiovascular:      Rate and Rhythm: Regular rhythm.      Heart sounds: No murmur heard.    No gallop.   Pulmonary:      Effort: Pulmonary effort is normal.      Breath sounds: No wheezing or rales.   Abdominal:      General: There is no distension.      Tenderness: There is no abdominal tenderness. There is no guarding or rebound.      Comments: surgical incision cdi  Musculoskeletal:         General: Normal range of motion.      Cervical back: Normal range of motion.      Right lower leg: No edema.      Left lower leg: No edema.   Skin:     General: Skin is warm and dry.   Neurological:      Mental Status: She is alert and oriented to person, place, and time.   Psychiatric:         Mood and Affect: Mood normal.         Behavior: Behavior normal.         Thought  Content: Thought content normal.         Judgment: Judgment normal.     Laboratory Results:  Most Recent Data:  CBC:   Lab Results   Component Value Date    WBC 10.25 12/03/2022    HGB 6.8 (L) 12/03/2022    HCT 21.0 (L) 12/03/2022     12/03/2022    MCV 94 12/03/2022    RDW 15.0 (H) 12/03/2022     BMP:   Lab Results   Component Value Date     (L) 12/03/2022    K 3.6 12/03/2022     12/03/2022    CO2 18 (L) 12/03/2022    BUN 17 12/03/2022    GLU 94 12/03/2022    CALCIUM 8.6 (L) 12/03/2022    MG 1.6 12/03/2022    PHOS 2.5 (L) 12/03/2022     LFTs:   Lab Results   Component Value Date    PROT 6.7 12/01/2022    ALBUMIN 2.6 (L) 12/03/2022    BILITOT 0.3 12/01/2022    AST 17 12/01/2022    ALKPHOS 77 12/01/2022    ALT 10 12/01/2022     Coags:   Lab Results   Component Value Date    INR 1.1 11/02/2022     FLP:   Lab Results   Component Value Date    CHOL 152 11/02/2022    HDL 64 11/02/2022    LDLCALC 73.4 11/02/2022    TRIG 73 11/02/2022    CHOLHDL 42.1 11/02/2022     DM:   Lab Results   Component Value Date    HGBA1C 11.1 (H) 11/02/2022    HGBA1C 9.8 (H) 09/15/2022    HGBA1C 8.3 (H) 05/08/2022    GLUF 185 (H) 08/05/2020    LDLCALC 73.4 11/02/2022    CREATININE 2.3 (H) 12/03/2022     Thyroid:   Lab Results   Component Value Date    TSH 2.332 09/15/2022     Anemia:   Lab Results   Component Value Date    IRON 35 07/25/2022    TIBC 271 07/25/2022    FERRITIN 343 (H) 07/25/2022    JHNZSSOW73 321 01/19/2022    FOLATE 4.6 01/19/2022     Cardiac:   Lab Results   Component Value Date    TROPONINI <0.006 09/14/2022    BNP 1,768 (H) 09/14/2022     Urinalysis:   Lab Results   Component Value Date    LABURIN  12/01/2022     Multiple organisms isolated. None in predominance.  Repeat if    LABURIN clinically necessary. 12/01/2022    COLORU Yellow 12/01/2022    SPECGRAV 1.010 12/01/2022    NITRITE Negative 12/01/2022    KETONESU Negative 12/01/2022    UROBILINOGEN normal 08/11/2020       Trended Lab Data:  Recent Labs    Lab 12/01/22  0909 12/01/22  1630 12/02/22  0622 12/02/22  0913 12/03/22  0622   WBC 11.09 14.22* 8.71 9.27 10.25   HGB 9.5* 7.4* 6.5* 7.2* 6.8*   HCT 29.2* 22.8* 20.0* 22.5* 21.0*   * 414 376 378 391   MCV 95 97 94 97 94   RDW 14.8* 15.2* 15.1* 15.1* 15.0*   * 131* 132*  --  135*   K 4.2 4.6 3.7  --  3.6    105 104  --  107   CO2 22* 17* 19*  --  18*   BUN 19 20 22*  --  17    118* 89  --  94   PROT 7.9 6.7  --   --   --    ALBUMIN 3.2*  3.2* 2.7* 2.6*  --  2.6*   BILITOT 0.3 0.3  --   --   --    AST 10 17  --   --   --    ALKPHOS 97 77  --   --   --    ALT 10 10  --   --   --          Microbiology Data:  Microbiology Results (last 7 days)       Procedure Component Value Units Date/Time    Urine culture [[id number]] Collected: 12/01/22 2319    Order Status: Completed Specimen: Urine Updated: 12/03/22 0810     Urine Culture, Routine Multiple organisms isolated. None in predominance.  Repeat if      clinically necessary.    Narrative:      Specimen Source->Urine    Blood culture - site #2 [[id number]] Collected: 12/01/22 1652    Order Status: Completed Specimen: Blood from Peripheral, Hand, Right Updated: 12/02/22 1812     Blood Culture, Routine No Growth to date      No Growth to date    Blood culture - site #1 [[id number]] Collected: 12/01/22 1632    Order Status: Completed Specimen: Blood from Peripheral, Wrist, Right Updated: 12/02/22 1812     Blood Culture, Routine No Growth to date      No Growth to date    Urine culture [[id number]] Collected: 12/01/22 2320    Order Status: Canceled Specimen: Urine               Antimicrobials:  Antibiotics (From admission, onward)      Start     Stop Route Frequency Ordered    12/05/22 0900  sulfamethoxazole-trimethoprim 400-80mg per tablet 1 tablet         -- Oral Every Mon, Wed, Fri 12/02/22 0938    12/01/22 1730  cefepime in dextrose 5 % 1 gram/50 mL IVPB 1 g         -- IV Every 12 hours (non-standard times) 12/01/22 1627    12/01/22 1721   vancomycin - pharmacy to dose  (vancomycin IVPB)        See [facility] for full Linked Orders Report.    -- IV pharmacy to manage frequency 12/01/22 1627          Antifungals (From admission, onward)      None          Antivirals (From admission, onward)          Stop Route Frequency     valGANciclovir         -- Oral Every Mon, Wed, Fri              Other Results:    Radiology:  X-Ray Abdomen AP 1 View    Result Date: 11/3/2022  EXAMINATION: XR ABDOMEN AP 1 VIEW CLINICAL HISTORY: possible gastric bubble; TECHNIQUE: Single AP View of the abdomen was performed. COMPARISON: 01/29/2021. FINDINGS: Midline vertical skin staples.  Surgical drain right flank.  Right lower quadrant renal transplant ureteral stent seen extending from over the right sacral ala, presumably over the right pelvic transplant, to the left lower pelvis over the bladder.  Bowel gas pattern is non-obstructive.  Regional osseous structures are similar to prior.     Bowel gas pattern is nonobstructive. Postop changes as above. Electronically signed by: Noé Hodges MD Date:    11/03/2022 Time:    21:39    US Transplant Kidney With Doppler    Result Date: 12/3/2022  EXAMINATION: US TRANSPLANT KIDNEY WITH DOPPLER CLINICAL HISTORY: s/p spk; f/u mild-moderate hydro; TECHNIQUE: Transplant renal ultrasound of the right lower quadrant with color flow doppler and duplex analysis. COMPARISON: Transplant renal ultrasound 12/01/2022.  CT abdomen pelvis 12/01/2022. FINDINGS: Status post right lower quadrant renal transplant on 11/03/2022.  The renal transplant measures 12.2 cm and demonstrates no focal parenchymal abnormality. Mild transplant hydronephrosis, decreased compared to prior exam..  No peritransplant fluid. Renal arterial resistive indices are as follows: Interlobar 0.81 (previously 0.80), segmental Sup 0.72 (previously 0.83), segmental Mid previously 0.78 (0.77), segmental Low previously 0.80 (0.80).  There is no evidence of a tardus parvus  waveform. The maximum velocity in the main renal artery is 98.1 (previously 166) cm/sec. Renal veins are patent.  There is a slightly elevated velocity at the renal vein anastomosis measuring 115 centimeters/second (previously 190 centimeters/second). The renal transplant venous system is unremarkable.     1. Interval decrease in transplant hydronephrosis.  Interval improvement of main renal artery velocity. 2. Intraparenchymal resistive indices are slightly elevated but stable compared to prior exam. 3. Improvement in renal vein anastomosis velocity which remains somewhat elevated.  Continued follow-up is recommended. Electronically signed by resident: Laura Sifuentes Date:    12/03/2022 Time:    09:34 Electronically signed by: Zulma Anaya MD Date:    12/03/2022 Time:    10:36    US Transplant Kidney With Doppler    Result Date: 12/1/2022  EXAMINATION: US TRANSPLANT KIDNEY WITH DOPPLER CLINICAL HISTORY: GEORGE in kidney transplant; TECHNIQUE: Real time gray scale and doppler ultrasound was performed over the patient's renal allograft. COMPARISON: Same day CT abdomen pelvis Ultrasound transplant kidney 11/25/2022 FINDINGS: Renal transplant 11/02/2022. Renal allograft in the right lower quadrant.  The allograft measures 12.2 cm. Normal perfusion. Mild to moderate hydronephrosis, new from prior.  Air noted within the collecting system and bladder similar to CT.  There is no ureteral stent. Minimal Peritransplant free fluid. Vasculature: Resistive indices: Interlobar 0.80 (previously 0.84) Segmental upper pole 0.83 (previously 0.90) Segmental mid pole 0.77 (previously 0.88) Segmental lower pole 0.80 (previously 0.80) Main renal artery peak systolic velocity: 166 cm/sec (previously 156 cm/sec) with normal waveform. Renal artery/iliac ratio: 0.86. The main renal vein is patent.  Increased velocity at the anastomosis measuring up to 190 cm/sec.     1. Interval removal of ureteral stent, with mild to moderate  hydronephrosis new from prior.  Air noted within the collecting system and bladder similar to same day CT and presumably related to ureteral stent removal. 2. Mild improvement in the renal arterial resistive indices; although, they remain slightly elevated, a nonspecific finding which can be seen with drug toxicity or rejection. 3. Increased velocity at the main renal vein anastomosis.  Attention on follow-up. Electronically signed by resident: Rodríguez Tam Date:    12/01/2022 Time:    20:26 Electronically signed by: Adrian Knox MD Date:    12/01/2022 Time:    20:38    US Transplant Kidney With Doppler    Result Date: 11/25/2022  EXAMINATION: US TRANSPLANT KIDNEY WITH DOPPLER CLINICAL HISTORY: severe GEORGE s/p kidney pancreas transplant. also with fevers; TECHNIQUE: Real time gray scale and doppler ultrasound was performed over the patient's renal allograft. COMPARISON: U/S transplant kidney with Doppler 11/05/2022 FINDINGS: Patient is status post renal allograft in the right lower quadrant on 11/02/2022.  The allograft measures 11.7 cm, previously 10.6 cm.  There is new diffuse parenchymal slightly more hypoechoic appearance which may reflect changes of edema.  Normal perfusion. No hydronephrosis.  Ureteral stent is visualized. No fluid collections. Vasculature: Resistive indices of the arteries: Interlobar: 0.84, previously 0.69 Upper segment: 0.90, previously 0.74 Mid segment: 0.88, previously 0.75 Lower segment: 0.80, previously 0.70 Main renal artery peak systolic velocity: 107cm/sec with normal waveform, previously 287cm/sec. Renal artery/iliac ratio: 0.48. The main renal vein is patent. Bladder contains some echogenic debris.     Renal allograft is slightly larger in size with new hypoechogenic appearance and interval increase in segmental renal arterial resistive indices.  Differential considerations include allograft edema, drug toxicity, and rejection. Debris within the bladder.  Recommend correlation with  urinalysis. This report was flagged in Epic as abnormal. Electronically signed by resident: Hemant Mendoza Date:    11/25/2022 Time:    17:41 Electronically signed by: Adrian Knox MD Date:    11/25/2022 Time:    18:23    US Transplant Kidney With Doppler    Result Date: 11/5/2022  EXAMINATION: US TRANSPLANT KIDNEY WITH DOPPLER CLINICAL HISTORY: cessation of urine post kidney/pancreas transplant; TECHNIQUE: Real time gray scale and doppler ultrasound was performed over the patient's renal allograft. COMPARISON: Kidney transplant Doppler ultrasound 11/04/2022. FINDINGS: Renal allograft in the right lower quadrant.  The allograft measures 10.8 cm, previously 10.6 cm.  Normal perfusion. No hydronephrosis.  There is a ureteral stent. Similar size of peritransplant fluid collection measuring 1.4 x 4.1 x 2.5 cm (previously 1.8 x 3.0 x 3.2 cm). Vasculature: Resistive indices: Intralobar: 0.69, previously 0.76. Upper segmental: 0.74, previously 0.74. Mid segmental: 0.75, previously 0.74. Lower segmental: 0.70, previously 0.69. Main renal artery peak systolic velocity: 287cm/sec with normal waveform, previously 384 centimeter/second.. Renal artery/iliac ratio: 2.1. The main renal vein is patent. Bladder is decompressed around a Moncada catheter and poorly visualized.     1. Interval improvement of elevated peak systolic velocity of the main renal artery. 2. Mild decrease in the resistive index in the intralobar renal artery.  Remainder of resistive indices are similar to ultrasound from 11/04/2022. 3. Similar size of peritransplant fluid collection. Electronically signed by resident: Palmira Lewis Date:    11/05/2022 Time:    18:11 Electronically signed by: Adrian Knox MD Date:    11/05/2022 Time:    18:46    US Transplant Kidney With Doppler    Result Date: 11/4/2022  EXAMINATION: US TRANSPLANT KIDNEY WITH DOPPLER CLINICAL HISTORY: Change in urine output; TECHNIQUE: Grayscale, duplex, and color Doppler evaluation of transplant  kidney performed. COMPARISON: Ultrasound transplant kidney 11/03/2022 FINDINGS: Status post kidney transplant on 11/02/2022. There is a transplant kidney identified in the right lower quadrant.  Renal allograft measures 10.6 cm in craniocaudal length. There is no renal mass or hydronephrosis.  Partially imaged nephroureteral stent. New peritransplant fluid collection along the lower pole of the transplant kidney measuring 3.2 x 3.0 x 1.8 cm. Doppler evaluation demonstrates a peak systolic velocity in the main renal artery of 383 cm/sec (previously 323 cm/sec).  The main renal artery has a normal waveform without evidence of parvus tardus.  The main renal artery to iliac artery velocity ratio is not elevated measuring 1.1.  The main renal vein is patent. The intrarenal resistive indices are as follows: Segmental upper pole: 0.74, previously 0.61. Segmental midpole: 0.74, previously 0.57. Segmental lower pole: 0.69, previously 0.56. Interlobar: 0.76, previously 0.60.     Mildly elevated arterial resistive indices, new from prior.  Differential considerations include edema, rejection, and ATN.  Waveforms are normal. New 3.2 cm peritransplant collection, likely a hematoma. No hydronephrosis. This report was flagged in Epic as abnormal. Electronically signed by resident: Reuben Vivas Date:    11/04/2022 Time:    13:32 Electronically signed by: Quinn Levine Date:    11/04/2022 Time:    14:26    X-Ray Chest AP Portable    Result Date: 11/25/2022  EXAMINATION: XR CHEST AP PORTABLE CLINICAL HISTORY: Sepsis; TECHNIQUE: Single frontal view of the chest was performed. COMPARISON: Chest radiograph 11/03/2022 and CT thorax 09/14/2022 FINDINGS: Patient is slightly rotated.  No detrimental change.The lungs are well expanded and clear, with normal appearance of pulmonary vasculature and no pleural effusion or pneumothorax. The cardiac silhouette is normal in size. The hilar and mediastinal contours are unremarkable. Bones are  intact.     No detrimental change or radiographic acute intrathoracic process seen.  Specifically, no consolidation. Electronically signed by: Adrian Knox MD Date:    11/25/2022 Time:    12:10    CT Abdomen Pelvis  Without Contrast    Result Date: 12/1/2022  EXAMINATION: CT ABDOMEN PELVIS WITHOUT CONTRAST CLINICAL HISTORY: s/p kidney/pancreas transplant with nausea/vomiting; TECHNIQUE: Axial images of the abdomen and pelvis were acquired without adminstration of contrast.    Coronal and sagittal reconstructions were also obtained COMPARISON: Ultrasound 01/25/2022.  CTA 09/14/2022 FINDINGS: Evaluation of intra-abdominal organs is limited due to lack contrast. HEART: Normal in size. No pericardial effusion. LUNG BASES: Visualized portions of the lungs are clear. LIVER: Normal in size and contour.  No focal hepatic lesion. GALLBLADDER: No calcified gallstones. BILE DUCTS: No evidence of dilated ducts. PANCREAS: Postoperative changes of pancreatic transplant.  Native pancreas is atrophic.  No peripancreatic fat stranding or. SPLEEN: Unremarkable. ADRENALS: Unremarkable. KIDNEYS/URETERS: Native kidneys atrophic.  Bilateral calcifications favored to represent vascular calcification or less likely stones.  No hydronephrosis. Postoperative changes of renal transplant.  There is collecting system air, could be related to interval removal of stent.  No evidence hydronephrosis nephrolithiasis. BLADDER: No evidence of wall thickening.  Intraluminal air, likely related to recent stent removal. REPRODUCTIVE: Bilateral adnexal hypodense structures with average 30-35 Hounsfield units measuring up to 2.4 cm, likely small complex/hemorrhagic cysts. STOMACH/DUODENUM: Small sized hiatal hernia. BOWEL/MESENTERY: Small bowel is normal in caliber with no evidence of obstruction. No evidence of inflammation or wall thickening.  Colon demonstrates no focal wall thickening.  Appendix is unremarkable. Mild diffuse mesenteric edema.  PERITONEUM: No intraperitoneal free air or significant fluid LYMPH NODES: No retroperitoneal lymphadenopathy. VESSELS: No aneurysm. Extensive calcific atherosclerosis of the mesenteric vessels and common femoral and its branches. ABDOMINAL WALL:  Midline incision.  There is soft tissue thickening along the incision. BONES: No acute fracture. No suspicious osseous lesions.     Evaluation of intra-abdominal structures is limited due to lack of contrast. 1. Postoperative changes of kidney and pancreatic transplant. 2. There is air within the renal allograft and bladder, likely related to interval removal of ureteric stent. 3. Small-sized hiatal hernia. 4. Extensive atherosclerotic disease of the mesenteric vessel and proximal lower extremities vessels. 5. Additional findings as above. Electronically signed by resident: Mars Ball Date:    12/01/2022 Time:    19:03 Electronically signed by: Adrian Knox MD Date:    12/01/2022 Time:    20:05    US Pancreas Transplant Post    Result Date: 11/25/2022  EXAMINATION: US DOPPLER PANCREAS TRANSPLANT POST (XPD) CLINICAL HISTORY: fevers s/p pancreas ultrasound; TECHNIQUE: Grayscale, color flow, and spectral analysis was used to evaluate the pancreas allograft using transabdominal ultrasound technique. COMPARISON: U/S doppler pancreas transplant 11/07/2022 FINDINGS: Pancreatic allograft is demonstrates a new heterogeneous hypoechoic area.  This most likely represents shadowing from overlying bowel.  Otherwise, pancreatic parenchyma appears similar to prior ultrasound.  Pancreatic duct measures 4 mm, previously 5 mm.  There are no peripancreatic fluid collections. Velocities:in cm/s Conduit:198, previously 245 Splenic artery:91, previously 36 Splenic vein:14, previously 25 Portal vein:73, previously 22 Superior mesenteric vein:45, previously 32 Iliac artery:177, previously 252 Superior mesenteric artery: 65, previously 32 Resistive indices: Head: 0.71 (previously 0.67) Body:  0.70 (previously 0.56) Tail: 0.65 (previously 0.56) While the resistive indices of the transplant pancreatic head, body and tail have overall increased from prior, current values are still within normal limits.     Satisfactory Doppler evaluation of pancreatic allograft. New area of hypoechogenicity, likely representing shadowing from bowel.  Attention on follow-up. Electronically signed by resident: Hemant Mendoza Date:    11/25/2022 Time:    17:27 Electronically signed by: Adrian Knox MD Date:    11/25/2022 Time:    17:54    US Pancreas Transplant Post    Result Date: 11/7/2022  EXAMINATION: US DOPPLER PANCREAS TRANSPLANT POST (XPD) CLINICAL HISTORY: s/p kidney/panc txp. panc enzymes mildly elevated; TECHNIQUE: Grayscale, color flow, and spectral analysis was used to evaluate the pancreas allograft using transabdominal ultrasound technique. COMPARISON: Pancreas transplant ultrasound: 11/03/2022. FINDINGS: Pancreatic transplant on 11/03/2022. Pancreatic allograft is homogeneous in echogenicity.  There are no peripancreatic fluid collections. VELOCITIES: Conduit:245 (previously 310) cm/s Splenic artery:36 (previously 42) cm/s SMA of Y graft: 32 (previously 36) cm/s. Splenic vein:25 (previously 31) cm/s Portal vein:22 (previously 48) cm/s Superior mesenteric vein:32 (previously 79) cm/s Iliac artery:252 (previously 253) cm/s RESISTIVE INDICES: Head: 0.67 (previously 0.50). Body: 0.56 (previously 0.59). Tail: 0.56 (previously 0.54).     Satisfactory Doppler evaluation the pancreatic allograft, as above. Electronically signed by resident: Jimmie Fuentes Date:    11/07/2022 Time:    13:29 Electronically signed by: Jim Cedeño MD Date:    11/07/2022 Time:    14:29

## 2022-12-03 NOTE — NURSING
Pt AAOx4, VSS, T max 100.7. Moncada adjusted 2/2 leaking, has since resolved. H/H 6.8/21.0 - 1U PRBC infused. ID consult - Dapto Q 24H & Cefepime Q 12H. 1 x vanc given. Resp panel sent. Good U/O, see flowsheet. Bed in low/locked position, call light/personal belongings w/in reach, non-slip socks in place, pt remains free from falls, WCTM.

## 2022-12-03 NOTE — PLAN OF CARE
"- Pt AAOx4, afebrile, VSS, tachycardia continues  - Vanc 1 x dose administered overnight  - Cefepime q12h  - Continuous NS at 100 cc/hr  - Moncada catheter intact draining yellow urine, refer to flowsheets for output. Secured to top of leg with paper tape as pt was reporting "burning" under catheter securement device.   - Midline incision ALY with steri strips intact  - Bed in lowest locked position, call light and personal items in reach, nonskid socks on, verbalized understanding to call for assistance   "

## 2022-12-03 NOTE — ASSESSMENT & PLAN NOTE
- With ongoing GEORGE  - Had ureteral stent removed 11/30/22  - Kidney US showed mild-mod hydro, dc placed  - Repeat US 12/3 with improved hydro and creatinine improved as well

## 2022-12-03 NOTE — ASSESSMENT & PLAN NOTE
"- Blood cx x 2, ngtd  - UA showed 11 WBCs, moderate bacteria, and 8 hyaline casts  - Urine cx with enterococcus  - CMV PCR, in process   - CT A/P unremarkable  - Kidney US showed mild-mod hydro, dc in place  - see "pyelonephritis"    "

## 2022-12-03 NOTE — SUBJECTIVE & OBJECTIVE
Subjective:   History of Present Illness:  Ms. Read is a 27 y.o.  female with ESRD secondary to diabetic nephropathy and HTN, now s/p SPK 11/3/22 (Thymo induction, CMV D-/R+). Her post-op course was complicated by urinary retention requiring Moncada replacement, now resolved. She was recently admitted to the hospital and discharged 11/29/22 after presenting with nausea/vomiting, GEORGE, and had fever. Infectious work up performed. Urine cx had +multiple organisms none in predominance. Kidney US with edema/possible pyelo, ID consulted. She was initially treated with broad spectrum antibiotics. She was discharged on PO cipro x 7 days at [place]. She now presents to the ED with complaints of ongoing nausea/vomiting, decreased appetite, intermittent abdominal pain. She had ureteral stent removed outpatient yesterday 11/30/22. She was in infusion center today to give IVF but had episode of vomiting so she was sent to the ED. She denies dysuria/urgency. She denies history of gastroparesis (has had gastric emptying study in 2020 that was normal). In ED, she is noted to be tachycardic in the 130s, blood pressure 90s, along with low grade fever 100.4. EKG shows sinus tachycardia. Creatinine remains elevated above baseline. She is receiving 1L LR bolus in ED. Plan for repeat infectious work up, IVF hydration, IV antibiotics, kidney US, and CT A/P. Will also check CMV PCR.    Ms. Read is a 27 y.o. year old female who is status post Kidney, Pancreas Transplant - 11/3/2022  (#1).    Her maintenance immunosuppression consists of:   Immunosuppressants (From admission, onward)      None            Hospital Course:  Interval history: No acute events overnight. Patient reports she is feeling better this am. Remains afebrile, BP improved. HR in 110's (baseline). Cont IVFs. Cr remains elevated at 3.0. Blood cxs ngtd. UA showed 11 WBCs, moderate bacteria, and 8 hyaline casts. Urine cx in process. Cont cefepime/vanc. Of note,  H/H 6.5/20.0 on am labs, repeat CBC 7.2/22.5. Dc in place for mild-mod hydro seen. Will repeat US tomorrow am. Will continue to monitor.     Interval History:  Complained of leakage around dc overnight. Seems to have been kinked because it functioned well in the room. KUS with improved hydro    Past Medical, Surgical, Family, and Social History:   Unchanged from H&P.    Scheduled Meds:   aspirin  81 mg Oral Daily    atorvastatin  40 mg Oral Daily    ceFEPime (MAXIPIME) IVPB  1 g Intravenous Q12H    DAPTOmycin (CUBICIN) IV  10 mg/kg Intravenous Q24H    heparin (porcine)  5,000 Units Subcutaneous Q8H    predniSONE  5 mg Oral Daily    sodium bicarbonate  1,300 mg Oral TID    [START ON 12/5/2022] sulfamethoxazole-trimethoprim 400-80mg  1 tablet Oral Every Mon, Wed, Fri    [START ON 12/5/2022] valGANciclovir  450 mg Oral Every Mon, Wed, Fri    venlafaxine  37.5 mg Oral Daily     Continuous Infusions:   sodium chloride 0.9% Stopped (12/03/22 0800)     PRN Meds:sodium chloride, sodium chloride, acetaminophen, ondansetron, promethazine, sodium chloride 0.9%, traMADoL    Intake/Output - Last 3 Shifts         12/01 0700  12/02 0659 12/02 0700 12/03 0659 12/03 0700  12/04 0659    P.O.  300     I.V. (mL/kg)   3049.5 (48.7)    Blood   350    IV Piggyback  99.3     Total Intake(mL/kg)  399.3 (6.4) 3399.5 (54.3)    Urine (mL/kg/hr) 1030 3350 (2.2) 1400 (2.6)    Stool  0     Total Output 1030 3350 1400    Net -1030 -2950.7 +1999.5           Stool Occurrence  1 x              Review of Systems   Constitutional:  Positive for fever.   HENT: Negative.     Eyes: Negative.    Respiratory: Negative.     Cardiovascular: Negative.    Gastrointestinal: Negative.    Endocrine: Negative.    Genitourinary:  Positive for decreased urine volume.   Musculoskeletal: Negative.    Skin: Negative.    Neurological: Negative.    Psychiatric/Behavioral: Negative.      Objective:     Vital Signs (Most Recent):  Temp: 99.4 °F (37.4 °C) (12/03/22  "1320)  Pulse: (!) 122 (12/03/22 1320)  Resp: 19 (12/03/22 1320)  BP: (!) 103/57 (12/03/22 1320)  SpO2: 100 % (12/03/22 1320)   Vital Signs (24h Range):  Temp:  [98 °F (36.7 °C)-100.7 °F (38.2 °C)] 99.4 °F (37.4 °C)  Pulse:  [120-140] 122  Resp:  [12-20] 19  SpO2:  [96 %-100 %] 100 %  BP: ()/(55-86) 103/57     Weight: 62.6 kg (138 lb 0.1 oz)  Height: 5' 4" (162.6 cm)  Body mass index is 23.69 kg/m².    Physical Exam  Vitals and nursing note reviewed.   Constitutional:       General: She is not in acute distress.  Eyes:      General: No scleral icterus.  Cardiovascular:      Rate and Rhythm: Normal rate.   Pulmonary:      Effort: Pulmonary effort is normal. No respiratory distress.   Abdominal:      General: There is no distension.      Palpations: Abdomen is soft.   Musculoskeletal:      Right lower leg: No edema.      Left lower leg: No edema.   Skin:     Coloration: Skin is not jaundiced.   Neurological:      Mental Status: She is alert.       Laboratory:  Labs within the past 24 hours have been reviewed.    Diagnostic Results:  US - Kidney: Results for orders placed during the hospital encounter of 12/01/22    US Transplant Kidney With Doppler    Narrative  EXAMINATION:  US TRANSPLANT KIDNEY WITH DOPPLER    CLINICAL HISTORY:  s/p spk; f/u mild-moderate hydro;    TECHNIQUE:  Transplant renal ultrasound of the right lower quadrant with color flow doppler and duplex analysis.    COMPARISON:  Transplant renal ultrasound 12/01/2022.  CT abdomen pelvis 12/01/2022.    FINDINGS:  Status post right lower quadrant renal transplant on 11/03/2022.  The renal transplant measures 12.2 cm and demonstrates no focal parenchymal abnormality. Mild transplant hydronephrosis, decreased compared to prior exam..  No peritransplant fluid.    Renal arterial resistive indices are as follows: Interlobar 0.81 (previously 0.80), segmental Sup 0.72 (previously 0.83), segmental Mid previously 0.78 (0.77), segmental Low previously 0.80 " (0.80).  There is no evidence of a tardus parvus waveform. The maximum velocity in the main renal artery is 98.1 (previously 166) cm/sec.    Renal veins are patent.  There is a slightly elevated velocity at the renal vein anastomosis measuring 115 centimeters/second (previously 190 centimeters/second).    The renal transplant venous system is unremarkable.    Impression  1. Interval decrease in transplant hydronephrosis.  Interval improvement of main renal artery velocity.  2. Intraparenchymal resistive indices are slightly elevated but stable compared to prior exam.  3. Improvement in renal vein anastomosis velocity which remains somewhat elevated.  Continued follow-up is recommended.    Electronically signed by resident: Laura Sifuentes  Date:    12/03/2022  Time:    09:34    Electronically signed by: Zulma Anaya MD  Date:    12/03/2022  Time:    10:36

## 2022-12-03 NOTE — PROGRESS NOTES
Pharmacokinetic Assessment Follow Up: IV Vancomycin    Vancomycin serum concentration assessment(s):    The random level was drawn correctly and can be used to guide therapy at this time. The measurement is below the desired definitive target range of 15 to 20 mcg/mL.    Vancomycin Regimen Plan:    Vancomycin 500 mg x 1 at 2000 on 12/2.    Re-dose when the random level is less than 20 mcg/mL, next level to be drawn at 0800 on 12/3    Drug levels (last 3 results):  Recent Labs   Lab Result Units 12/02/22  1702   Vancomycin, Random ug/mL 18.7       Pharmacy will continue to follow and monitor vancomycin.    Please contact pharmacy at extension [phone] for questions regarding this assessment.    Thank you for the consult,   Gurmeet Menjivar       Patient brief summary:  Isabela Read is a 27 y.o. female initiated on antimicrobial therapy with IV Vancomycin for treatment of urinary tract infection      Drug Allergies:   Review of patient's allergies indicates:  No Known Allergies    Actual Body Weight:   61 kg    Renal Function:   Estimated Creatinine Clearance: 24.3 mL/min (A) (based on SCr of 3 mg/dL (H)).,     Dialysis Method (if applicable):  N/A    CBC (last 72 hours):  Recent Labs   Lab Result Units 12/01/22  0909 12/01/22  1630 12/02/22  0622 12/02/22  0913   WBC K/uL 11.09 14.22* 8.71 9.27   Hemoglobin g/dL 9.5* 7.4* 6.5* 7.2*   Hematocrit % 29.2* 22.8* 20.0* 22.5*   Platelets K/uL 539* 414 376 378   Gran % % 93.8* 94.3* 88.5* 88.7*   Lymph % % 3.2* 2.2* 5.5* 5.8*   Mono % % 1.4* 1.9* 4.1 3.6*   Eosinophil % % 0.9 0.4 1.0 0.8   Basophil % % 0.1 0.1 0.1 0.1   Differential Method  Automated Automated Automated Automated       Metabolic Panel (last 72 hours):  Recent Labs   Lab Result Units 12/01/22  0909 12/01/22  1630 12/01/22  2319 12/02/22  0622   Sodium mmol/L 134* 131*  --  132*   Potassium mmol/L 4.2 4.6  --  3.7   Chloride mmol/L 103 105  --  104   CO2 mmol/L 22* 17*  --  19*   Glucose mg/dL 106 118*  --   89   Glucose, UA   --   --  Negative  --    BUN mg/dL 19 20  --  22*   Creatinine mg/dL 2.8* 3.0*  --  3.0*   Albumin g/dL 3.2*  3.2* 2.7*  --  2.6*   Total Bilirubin mg/dL 0.3 0.3  --   --    Alkaline Phosphatase U/L 97 77  --   --    AST U/L 10 17  --   --    ALT U/L 10 10  --   --    Magnesium mg/dL  --  1.6  --  1.4*   Phosphorus mg/dL 1.9* 1.9*  --  4.6*       Vancomycin Administrations:  vancomycin given in the last 96 hours                     vancomycin 1.25 g in dextrose 5% 250 mL IVPB (ready to mix) (mg) 1,250 mg New Bag 12/01/22 2015                    Microbiologic Results:  Microbiology Results (last 7 days)       Procedure Component Value Units Date/Time    Blood culture - site #2 [[id number]] Collected: 12/01/22 1652    Order Status: Completed Specimen: Blood from Peripheral, Hand, Right Updated: 12/02/22 1812     Blood Culture, Routine No Growth to date      No Growth to date    Blood culture - site #1 [[id number]] Collected: 12/01/22 1632    Order Status: Completed Specimen: Blood from Peripheral, Wrist, Right Updated: 12/02/22 1812     Blood Culture, Routine No Growth to date      No Growth to date    Urine culture [[id number]] Collected: 12/01/22 2319    Order Status: No result Specimen: Urine Updated: 12/01/22 2354    Urine culture [[id number]] Collected: 12/01/22 2320    Order Status: Canceled Specimen: Urine

## 2022-12-03 NOTE — PROGRESS NOTES
"Pharmacokinetic Assessment Follow Up: IV Vancomycin    Vancomycin discontinued as urine culture grew Enterococcus pending ID- switched to daptomycin. Pharmacy will sign of on vancomycin monitoring - re-consult as needed.    Radha Soria ([place])  [id number] Solid Organ Transplant Pharmacy Resident      "

## 2022-12-04 LAB
ALBUMIN SERPL BCP-MCNC: 2 G/DL (ref 3.5–5.2)
ALBUMIN SERPL BCP-MCNC: 2 G/DL (ref 3.5–5.2)
ALBUMIN SERPL BCP-MCNC: 2.5 G/DL (ref 3.5–5.2)
ALBUMIN SERPL BCP-MCNC: 2.7 G/DL (ref 3.5–5.2)
ALP SERPL-CCNC: 74 U/L (ref 55–135)
ALT SERPL W/O P-5'-P-CCNC: 15 U/L (ref 10–44)
AMYLASE SERPL-CCNC: 40 U/L (ref 20–110)
AMYLASE SERPL-CCNC: 52 U/L (ref 20–110)
ANION GAP SERPL CALC-SCNC: 10 MMOL/L (ref 8–16)
ANION GAP SERPL CALC-SCNC: 10 MMOL/L (ref 8–16)
ANION GAP SERPL CALC-SCNC: 12 MMOL/L (ref 8–16)
ANION GAP SERPL CALC-SCNC: 12 MMOL/L (ref 8–16)
AST SERPL-CCNC: 15 U/L (ref 10–40)
BACTERIA #/AREA URNS AUTO: ABNORMAL /HPF
BASOPHILS # BLD AUTO: 0.04 K/UL (ref 0–0.2)
BASOPHILS # BLD AUTO: 0.04 K/UL (ref 0–0.2)
BASOPHILS NFR BLD: 0.2 % (ref 0–1.9)
BASOPHILS NFR BLD: 0.3 % (ref 0–1.9)
BILIRUB SERPL-MCNC: 0.4 MG/DL (ref 0.1–1)
BILIRUB UR QL STRIP: NEGATIVE
BUN SERPL-MCNC: 17 MG/DL (ref 6–20)
BUN SERPL-MCNC: 17 MG/DL (ref 6–20)
BUN SERPL-MCNC: 19 MG/DL (ref 6–20)
BUN SERPL-MCNC: 21 MG/DL (ref 6–20)
CALCIUM SERPL-MCNC: 6.9 MG/DL (ref 8.7–10.5)
CALCIUM SERPL-MCNC: 6.9 MG/DL (ref 8.7–10.5)
CALCIUM SERPL-MCNC: 8.7 MG/DL (ref 8.7–10.5)
CALCIUM SERPL-MCNC: 9.4 MG/DL (ref 8.7–10.5)
CHLORIDE SERPL-SCNC: 108 MMOL/L (ref 95–110)
CHLORIDE SERPL-SCNC: 109 MMOL/L (ref 95–110)
CHLORIDE SERPL-SCNC: 89 MMOL/L (ref 95–110)
CHLORIDE SERPL-SCNC: 89 MMOL/L (ref 95–110)
CK SERPL-CCNC: 19 U/L (ref 20–180)
CLARITY UR REFRACT.AUTO: CLEAR
CO2 SERPL-SCNC: 20 MMOL/L (ref 23–29)
CO2 SERPL-SCNC: 21 MMOL/L (ref 23–29)
CO2 SERPL-SCNC: 40 MMOL/L (ref 23–29)
CO2 SERPL-SCNC: 40 MMOL/L (ref 23–29)
COLOR UR AUTO: YELLOW
CREAT SERPL-MCNC: 3.2 MG/DL (ref 0.5–1.4)
CREAT SERPL-MCNC: 3.2 MG/DL (ref 0.5–1.4)
CREAT SERPL-MCNC: 3.3 MG/DL (ref 0.5–1.4)
CREAT SERPL-MCNC: 3.5 MG/DL (ref 0.5–1.4)
DIFFERENTIAL METHOD: ABNORMAL
DIFFERENTIAL METHOD: ABNORMAL
EOSINOPHIL # BLD AUTO: 0 K/UL (ref 0–0.5)
EOSINOPHIL # BLD AUTO: 0 K/UL (ref 0–0.5)
EOSINOPHIL NFR BLD: 0.1 % (ref 0–8)
EOSINOPHIL NFR BLD: 0.2 % (ref 0–8)
ERYTHROCYTE [DISTWIDTH] IN BLOOD BY AUTOMATED COUNT: 15.7 % (ref 11.5–14.5)
ERYTHROCYTE [DISTWIDTH] IN BLOOD BY AUTOMATED COUNT: 16 % (ref 11.5–14.5)
EST. GFR  (NO RACE VARIABLE): 17.6 ML/MIN/1.73 M^2
EST. GFR  (NO RACE VARIABLE): 18.9 ML/MIN/1.73 M^2
EST. GFR  (NO RACE VARIABLE): 19.6 ML/MIN/1.73 M^2
EST. GFR  (NO RACE VARIABLE): 19.6 ML/MIN/1.73 M^2
GLUCOSE SERPL-MCNC: 172 MG/DL (ref 70–110)
GLUCOSE SERPL-MCNC: 838 MG/DL (ref 70–110)
GLUCOSE SERPL-MCNC: 838 MG/DL (ref 70–110)
GLUCOSE SERPL-MCNC: 91 MG/DL (ref 70–110)
GLUCOSE UR QL STRIP: ABNORMAL
HCT VFR BLD AUTO: 26 % (ref 37–48.5)
HCT VFR BLD AUTO: 30.2 % (ref 37–48.5)
HGB BLD-MCNC: 8.8 G/DL (ref 12–16)
HGB BLD-MCNC: 9.8 G/DL (ref 12–16)
HGB UR QL STRIP: ABNORMAL
HYALINE CASTS UR QL AUTO: 0 /LPF
IMM GRANULOCYTES # BLD AUTO: 0.08 K/UL (ref 0–0.04)
IMM GRANULOCYTES # BLD AUTO: 0.14 K/UL (ref 0–0.04)
IMM GRANULOCYTES NFR BLD AUTO: 0.5 % (ref 0–0.5)
IMM GRANULOCYTES NFR BLD AUTO: 0.8 % (ref 0–0.5)
KETONES UR QL STRIP: NEGATIVE
LACTATE SERPL-SCNC: 0.6 MMOL/L (ref 0.5–2.2)
LEUKOCYTE ESTERASE UR QL STRIP: NEGATIVE
LIPASE SERPL-CCNC: 17 U/L (ref 4–60)
LIPASE SERPL-CCNC: 8 U/L (ref 4–60)
LYMPHOCYTES # BLD AUTO: 0.4 K/UL (ref 1–4.8)
LYMPHOCYTES # BLD AUTO: 0.4 K/UL (ref 1–4.8)
LYMPHOCYTES NFR BLD: 2 % (ref 18–48)
LYMPHOCYTES NFR BLD: 2.5 % (ref 18–48)
MAGNESIUM SERPL-MCNC: 1.6 MG/DL (ref 1.6–2.6)
MCH RBC QN AUTO: 30 PG (ref 27–31)
MCH RBC QN AUTO: 30.3 PG (ref 27–31)
MCHC RBC AUTO-ENTMCNC: 32.5 G/DL (ref 32–36)
MCHC RBC AUTO-ENTMCNC: 33.8 G/DL (ref 32–36)
MCV RBC AUTO: 90 FL (ref 82–98)
MCV RBC AUTO: 92 FL (ref 82–98)
MICROSCOPIC COMMENT: ABNORMAL
MONOCYTES # BLD AUTO: 0.5 K/UL (ref 0.3–1)
MONOCYTES # BLD AUTO: 0.5 K/UL (ref 0.3–1)
MONOCYTES NFR BLD: 2.6 % (ref 4–15)
MONOCYTES NFR BLD: 3.3 % (ref 4–15)
NEUTROPHILS # BLD AUTO: 14.3 K/UL (ref 1.8–7.7)
NEUTROPHILS # BLD AUTO: 16.3 K/UL (ref 1.8–7.7)
NEUTROPHILS NFR BLD: 93.2 % (ref 38–73)
NEUTROPHILS NFR BLD: 94.3 % (ref 38–73)
NITRITE UR QL STRIP: NEGATIVE
NON-SQ EPI CELLS #/AREA URNS AUTO: 0 /HPF
NRBC BLD-RTO: 0 /100 WBC
NRBC BLD-RTO: 0 /100 WBC
PH UR STRIP: 8 [PH] (ref 5–8)
PHOSPHATE SERPL-MCNC: 1.8 MG/DL (ref 2.7–4.5)
PHOSPHATE SERPL-MCNC: 2.2 MG/DL (ref 2.7–4.5)
PHOSPHATE SERPL-MCNC: 2.8 MG/DL (ref 2.7–4.5)
PLATELET # BLD AUTO: 405 K/UL (ref 150–450)
PLATELET # BLD AUTO: 452 K/UL (ref 150–450)
PMV BLD AUTO: 10.2 FL (ref 9.2–12.9)
PMV BLD AUTO: 10.3 FL (ref 9.2–12.9)
POCT GLUCOSE: 133 MG/DL (ref 70–110)
POTASSIUM SERPL-SCNC: 2.6 MMOL/L (ref 3.5–5.1)
POTASSIUM SERPL-SCNC: 2.6 MMOL/L (ref 3.5–5.1)
POTASSIUM SERPL-SCNC: 3.2 MMOL/L (ref 3.5–5.1)
POTASSIUM SERPL-SCNC: 3.4 MMOL/L (ref 3.5–5.1)
PROT SERPL-MCNC: 5.1 G/DL (ref 6–8.4)
PROT UR QL STRIP: ABNORMAL
RBC # BLD AUTO: 2.9 M/UL (ref 4–5.4)
RBC # BLD AUTO: 3.27 M/UL (ref 4–5.4)
RBC #/AREA URNS AUTO: 44 /HPF (ref 0–4)
SODIUM SERPL-SCNC: 139 MMOL/L (ref 136–145)
SODIUM SERPL-SCNC: 139 MMOL/L (ref 136–145)
SODIUM SERPL-SCNC: 141 MMOL/L (ref 136–145)
SODIUM SERPL-SCNC: 141 MMOL/L (ref 136–145)
SP GR UR STRIP: 1.01 (ref 1–1.03)
TACROLIMUS BLD-MCNC: 15.7 NG/ML (ref 5–15)
URN SPEC COLLECT METH UR: ABNORMAL
WBC # BLD AUTO: 15.36 K/UL (ref 3.9–12.7)
WBC # BLD AUTO: 17.3 K/UL (ref 3.9–12.7)
WBC #/AREA URNS AUTO: 0 /HPF (ref 0–5)

## 2022-12-04 PROCEDURE — C1751 CATH, INF, PER/CENT/MIDLINE: HCPCS

## 2022-12-04 PROCEDURE — 87493 C DIFF AMPLIFIED PROBE: CPT | Performed by: INTERNAL MEDICINE

## 2022-12-04 PROCEDURE — 80069 RENAL FUNCTION PANEL: CPT | Mod: 91 | Performed by: INTERNAL MEDICINE

## 2022-12-04 PROCEDURE — 51798 US URINE CAPACITY MEASURE: CPT

## 2022-12-04 PROCEDURE — 83735 ASSAY OF MAGNESIUM: CPT | Performed by: PHYSICIAN ASSISTANT

## 2022-12-04 PROCEDURE — 80069 RENAL FUNCTION PANEL: CPT | Mod: 91

## 2022-12-04 PROCEDURE — 83690 ASSAY OF LIPASE: CPT | Mod: 91 | Performed by: INTERNAL MEDICINE

## 2022-12-04 PROCEDURE — 87324 CLOSTRIDIUM AG IA: CPT | Performed by: INTERNAL MEDICINE

## 2022-12-04 PROCEDURE — 82550 ASSAY OF CK (CPK): CPT | Performed by: PHYSICIAN ASSISTANT

## 2022-12-04 PROCEDURE — 99900035 HC TECH TIME PER 15 MIN (STAT)

## 2022-12-04 PROCEDURE — 83690 ASSAY OF LIPASE: CPT | Performed by: PHYSICIAN ASSISTANT

## 2022-12-04 PROCEDURE — 36415 COLL VENOUS BLD VENIPUNCTURE: CPT | Performed by: STUDENT IN AN ORGANIZED HEALTH CARE EDUCATION/TRAINING PROGRAM

## 2022-12-04 PROCEDURE — 25000003 PHARM REV CODE 250: Performed by: PHYSICIAN ASSISTANT

## 2022-12-04 PROCEDURE — 36415 COLL VENOUS BLD VENIPUNCTURE: CPT

## 2022-12-04 PROCEDURE — 87086 URINE CULTURE/COLONY COUNT: CPT | Performed by: PHYSICIAN ASSISTANT

## 2022-12-04 PROCEDURE — 25000003 PHARM REV CODE 250

## 2022-12-04 PROCEDURE — 82150 ASSAY OF AMYLASE: CPT | Performed by: PHYSICIAN ASSISTANT

## 2022-12-04 PROCEDURE — 36415 COLL VENOUS BLD VENIPUNCTURE: CPT | Performed by: PHYSICIAN ASSISTANT

## 2022-12-04 PROCEDURE — 94761 N-INVAS EAR/PLS OXIMETRY MLT: CPT

## 2022-12-04 PROCEDURE — 85025 COMPLETE CBC W/AUTO DIFF WBC: CPT | Mod: 91 | Performed by: STUDENT IN AN ORGANIZED HEALTH CARE EDUCATION/TRAINING PROGRAM

## 2022-12-04 PROCEDURE — 63600175 PHARM REV CODE 636 W HCPCS: Performed by: INTERNAL MEDICINE

## 2022-12-04 PROCEDURE — 87040 BLOOD CULTURE FOR BACTERIA: CPT | Mod: 59 | Performed by: PHYSICIAN ASSISTANT

## 2022-12-04 PROCEDURE — 80069 RENAL FUNCTION PANEL: CPT | Performed by: PHYSICIAN ASSISTANT

## 2022-12-04 PROCEDURE — 63600175 PHARM REV CODE 636 W HCPCS: Performed by: STUDENT IN AN ORGANIZED HEALTH CARE EDUCATION/TRAINING PROGRAM

## 2022-12-04 PROCEDURE — 87040 BLOOD CULTURE FOR BACTERIA: CPT | Mod: 59

## 2022-12-04 PROCEDURE — 99233 PR SUBSEQUENT HOSPITAL CARE,LEVL III: ICD-10-PCS | Mod: GC,,, | Performed by: INTERNAL MEDICINE

## 2022-12-04 PROCEDURE — 63600175 PHARM REV CODE 636 W HCPCS: Mod: JG | Performed by: PHYSICIAN ASSISTANT

## 2022-12-04 PROCEDURE — 36415 COLL VENOUS BLD VENIPUNCTURE: CPT | Performed by: INTERNAL MEDICINE

## 2022-12-04 PROCEDURE — 36410 VNPNXR 3YR/> PHY/QHP DX/THER: CPT

## 2022-12-04 PROCEDURE — 63600175 PHARM REV CODE 636 W HCPCS: Performed by: PHYSICIAN ASSISTANT

## 2022-12-04 PROCEDURE — 99233 SBSQ HOSP IP/OBS HIGH 50: CPT | Mod: GC,,, | Performed by: INTERNAL MEDICINE

## 2022-12-04 PROCEDURE — 83605 ASSAY OF LACTIC ACID: CPT | Performed by: STUDENT IN AN ORGANIZED HEALTH CARE EDUCATION/TRAINING PROGRAM

## 2022-12-04 PROCEDURE — 80197 ASSAY OF TACROLIMUS: CPT | Performed by: PHYSICIAN ASSISTANT

## 2022-12-04 PROCEDURE — 20600001 HC STEP DOWN PRIVATE ROOM

## 2022-12-04 PROCEDURE — 82150 ASSAY OF AMYLASE: CPT | Mod: 91 | Performed by: INTERNAL MEDICINE

## 2022-12-04 PROCEDURE — 85025 COMPLETE CBC W/AUTO DIFF WBC: CPT | Performed by: PHYSICIAN ASSISTANT

## 2022-12-04 PROCEDURE — 80053 COMPREHEN METABOLIC PANEL: CPT | Performed by: STUDENT IN AN ORGANIZED HEALTH CARE EDUCATION/TRAINING PROGRAM

## 2022-12-04 PROCEDURE — 81001 URINALYSIS AUTO W/SCOPE: CPT | Performed by: PHYSICIAN ASSISTANT

## 2022-12-04 PROCEDURE — 87799 DETECT AGENT NOS DNA QUANT: CPT | Performed by: PHYSICIAN ASSISTANT

## 2022-12-04 RX ORDER — MORPHINE SULFATE 2 MG/ML
2 INJECTION, SOLUTION INTRAMUSCULAR; INTRAVENOUS EVERY 6 HOURS PRN
Status: DISCONTINUED | OUTPATIENT
Start: 2022-12-04 | End: 2022-12-08

## 2022-12-04 RX ORDER — NAPROXEN SODIUM 220 MG/1
81 TABLET, FILM COATED ORAL DAILY
Status: DISCONTINUED | OUTPATIENT
Start: 2022-12-05 | End: 2022-12-07

## 2022-12-04 RX ORDER — ACETAMINOPHEN 650 MG/1
650 SUPPOSITORY RECTAL ONCE
Status: DISCONTINUED | OUTPATIENT
Start: 2022-12-04 | End: 2022-12-05

## 2022-12-04 RX ADMIN — ONDANSETRON 4 MG: 2 INJECTION INTRAMUSCULAR; INTRAVENOUS at 01:12

## 2022-12-04 RX ADMIN — CEFEPIME HYDROCHLORIDE 1 G: 1 INJECTION, POWDER, FOR SOLUTION INTRAMUSCULAR; INTRAVENOUS at 04:12

## 2022-12-04 RX ADMIN — MICAFUNGIN SODIUM 100 MG: 100 INJECTION, POWDER, LYOPHILIZED, FOR SOLUTION INTRAVENOUS at 10:12

## 2022-12-04 RX ADMIN — ACETAMINOPHEN 650 MG: 325 TABLET ORAL at 08:12

## 2022-12-04 RX ADMIN — ACETAMINOPHEN 650 MG: 325 TABLET ORAL at 09:12

## 2022-12-04 RX ADMIN — SODIUM CHLORIDE: 0.9 INJECTION, SOLUTION INTRAVENOUS at 03:12

## 2022-12-04 RX ADMIN — PIPERACILLIN SODIUM AND TAZOBACTAM SODIUM 4.5 G: 4; .5 INJECTION, POWDER, LYOPHILIZED, FOR SOLUTION INTRAVENOUS at 11:12

## 2022-12-04 RX ADMIN — MORPHINE SULFATE 2 MG: 2 INJECTION, SOLUTION INTRAMUSCULAR; INTRAVENOUS at 04:12

## 2022-12-04 RX ADMIN — SODIUM BICARBONATE: 84 INJECTION, SOLUTION INTRAVENOUS at 04:12

## 2022-12-04 RX ADMIN — POTASSIUM BICARBONATE 25 MEQ: 978 TABLET, EFFERVESCENT ORAL at 11:12

## 2022-12-04 RX ADMIN — ONDANSETRON 4 MG: 2 INJECTION INTRAMUSCULAR; INTRAVENOUS at 08:12

## 2022-12-04 RX ADMIN — ONDANSETRON 4 MG: 2 INJECTION INTRAMUSCULAR; INTRAVENOUS at 07:12

## 2022-12-04 RX ADMIN — DAPTOMYCIN 625 MG: 350 INJECTION, POWDER, LYOPHILIZED, FOR SOLUTION INTRAVENOUS at 01:12

## 2022-12-04 RX ADMIN — CEFEPIME HYDROCHLORIDE 1 G: 1 INJECTION, POWDER, FOR SOLUTION INTRAMUSCULAR; INTRAVENOUS at 03:12

## 2022-12-04 NOTE — PROGRESS NOTES
[facility] - Transplant Stepdown  Kidney Transplant  Progress Note      Reason for Follow-up: Reassessment of Kidney, Pancreas Transplant - 11/3/2022  (#1) recipient and management of immunosuppression.    ORGAN:  RIGHT KIDNEY   Donor Type:  Donation after Brain Death         Subjective:   History of Present Illness:  Ms. Read is a 27 y.o.  female with ESRD secondary to diabetic nephropathy and HTN, now s/p SPK 11/3/22 (Thymo induction, CMV D-/R+). Her post-op course was complicated by urinary retention requiring Moncada replacement, now resolved. She was recently admitted to the hospital and discharged 11/29/22 after presenting with nausea/vomiting, GEORGE, and had fever. Infectious work up performed. Urine cx had +multiple organisms none in predominance. Kidney US with edema/possible pyelo, ID consulted. She was initially treated with broad spectrum antibiotics. She was discharged on PO cipro x 7 days at [place]. She now presents to the ED with complaints of ongoing nausea/vomiting, decreased appetite, intermittent abdominal pain. She had ureteral stent removed outpatient yesterday 11/30/22. She was in infusion center today to give IVF but had episode of vomiting so she was sent to the ED. She denies dysuria/urgency. She denies history of gastroparesis (has had gastric emptying study in 2020 that was normal). In ED, she is noted to be tachycardic in the 130s, blood pressure 90s, along with low grade fever 100.4. EKG shows sinus tachycardia. Creatinine remains elevated above baseline. She is receiving 1L LR bolus in ED. Plan for repeat infectious work up, IVF hydration, IV antibiotics, kidney US, and CT A/P. Will also check CMV PCR.    Ms. Read is a 27 y.o. year old female who is status post Kidney, Pancreas Transplant - 11/3/2022  (#1).    Her maintenance immunosuppression consists of:   Immunosuppressants (From admission, onward)      None            Hospital Course:  Interval history: No acute events  overnight. Patient reports she is feeling better this am. Remains afebrile, BP improved. HR in 110's (baseline). Cont IVFs. Cr remains elevated at 3.0. Blood cxs ngtd. UA showed 11 WBCs, moderate bacteria, and 8 hyaline casts. Urine cx in process. Cont cefepime/vanc. Of note, H/H 6.5/20.0 on am labs, repeat CBC 7.2/22.5. Dc in place for mild-mod hydro seen. Will repeat US tomorrow am. Will continue to monitor.     Interval History:  N/V, severe abdominal pain this morning with rise in creatinine. Urine in bladder despite dc catheter. Catheter replaced after failing voiding trial. Discussed with surgery and CT and repeat KUS ordered.    Past Medical, Surgical, Family, and Social History:   Unchanged from H&P.    Scheduled Meds:   [START ON 12/5/2022] aspirin  81 mg Oral Daily    atorvastatin  40 mg Oral Daily    ceFEPime (MAXIPIME) IVPB  1 g Intravenous Q12H    DAPTOmycin (CUBICIN) IV  10 mg/kg Intravenous Q24H    [START ON 12/5/2022] ganciclovir (CYTOVENE) IVPB  0.625 mg/kg Intravenous Q24H    heparin (porcine)  5,000 Units Subcutaneous Q8H    predniSONE  5 mg Oral Daily    [START ON 12/5/2022] sulfamethoxazole-trimethoprim 400-80mg  1 tablet Oral Every Mon, Wed, Fri    venlafaxine  37.5 mg Oral Daily     Continuous Infusions:   sodium bicarbonate drip       PRN Meds:sodium chloride, sodium chloride, acetaminophen, morphine, ondansetron, promethazine, sodium chloride 0.9%, traMADoL    Intake/Output - Last 3 Shifts         12/02 0700  12/03 0659 12/03 0700 12/04 0659 12/04 0700  12/05 0659    P.O. 300 2297     I.V. (mL/kg)  3052.8 (49.1)     Blood  350     IV Piggyback 99.3 346.7     Total Intake(mL/kg) 399.3 (6.4) 6046.5 (97.2)     Urine (mL/kg/hr) 3350 (2.2) 3500 (2.3) 1275 (2.2)    Emesis/NG output  0 150    Stool 0 0 0    Total Output 3350 3500 1425    Net -2950.7 +2546.5 -1425           Stool Occurrence 1 x 5 x 1 x    Emesis Occurrence  1 x 1 x             Review of Systems   Constitutional:   "Positive for fever.   HENT: Negative.     Eyes: Negative.    Respiratory: Negative.     Cardiovascular: Negative.    Gastrointestinal:  Positive for abdominal distention, abdominal pain, nausea and vomiting.   Endocrine: Negative.    Genitourinary:  Positive for pelvic pain.   Musculoskeletal: Negative.    Skin: Negative.    Neurological: Negative.    Psychiatric/Behavioral: Negative.      Objective:     Vital Signs (Most Recent):  Temp: 99.2 °F (37.3 °C) (12/04/22 1326)  Pulse: (!) 133 (12/04/22 1431)  Resp: 19 (12/04/22 1610)  BP: (!) 166/103 (12/04/22 1326)  SpO2: 100 % (12/04/22 1326)   Vital Signs (24h Range):  Temp:  [98.1 °F (36.7 °C)-99.3 °F (37.4 °C)] 99.2 °F (37.3 °C)  Pulse:  [117-138] 133  Resp:  [12-20] 19  SpO2:  [95 %-100 %] 100 %  BP: (109-166)/() 166/103     Weight: 62.2 kg (137 lb 2 oz)  Height: 5' 4" (162.6 cm)  Body mass index is 23.54 kg/m².    Physical Exam  Vitals and nursing note reviewed.   Constitutional:       General: She is not in acute distress.     Appearance: She is ill-appearing.   Eyes:      General: No scleral icterus.  Cardiovascular:      Rate and Rhythm: Tachycardia present.   Pulmonary:      Effort: Pulmonary effort is normal. No respiratory distress.   Abdominal:      General: There is distension (suprapubic).      Tenderness: There is abdominal tenderness.   Musculoskeletal:      Right lower leg: No edema.      Left lower leg: No edema.   Skin:     Coloration: Skin is not jaundiced.   Neurological:      Mental Status: She is alert.       Laboratory:  Labs within the past 24 hours have been reviewed.    Diagnostic Results:  CT - Abd/Pelvic: No results found. However, due to the size of the patient record, not all encounters were searched. Please check Results Review for a complete set of results.  US - Kidney: Results for orders placed during the hospital encounter of 12/01/22    US Transplant Kidney With Doppler    Narrative  EXAMINATION:  US TRANSPLANT KIDNEY WITH " "DOPPLER    CLINICAL HISTORY:  s/p SPK. increase in Cr, severe abdominal pain;    TECHNIQUE:  Transplant renal ultrasound of the right lower quadrant with color flow doppler and duplex analysis.    COMPARISON:  Renal transplant ultrasound 12/03/2022.  12/01/2022.    FINDINGS:  Status post right lower quadrant renal transplant on 11/03/2022.  The renal transplant measures 12.5 cm and demonstrates no focal parenchymal abnormality. Minimal transplant hydronephrosis.  No peritransplant fluid.    Renal arterial resistive indices are as follows: Interlobar 0.77 (previously 0.81), segmental Up 0.75 (previously 0.72), segmental Mid 0.77 (previously 0.78), segmental Low 0.76 (previously 0.80).  There is no evidence of a tardus parvus waveform. The maximum velocity in the main renal artery is 236 (previously 187) cm/sec.  Maximum velocity in the main renal vein is 139 (previously 115) centimeters/second.  RA/iliac ratio 0.97    The renal transplant venous system is unremarkable.    Bladder is decompressed around Dc catheter.    Impression  Minimal transplant hydronephrosis.    Mild interval increased main renal artery and main renal vein peak systolic velocity compared to prior.  Recommend attention on follow-up.    Intraparenchymal resistive indices are improved compared to multiple prior exams.    Electronically signed by resident: Laura Sifuentes  Date:    12/04/2022  Time:    15:10    Electronically signed by: Zulma Anaya MD  Date:    12/04/2022  Time:    16:05    Assessment/Plan:     * SIRS (systemic inflammatory response syndrome)  - Blood cx x 2, ngtd  - UA showed 11 WBCs, moderate bacteria, and 8 hyaline casts  - Urine cx with enterococcus  - CMV PCR, in process   - CT A/P unremarkable  - Kidney US showed mild-mod hydro, dc in place  - see "pyelonephritis"      Fever  - See SIRS      Nausea & vomiting  - initial CT A/P without contrast unremarkable   - significant worsening on 12/4 with repeat KUS and CT. " US benign, but CT appears to show urine in bladder despite dc  - discussed with surgery  - consulted urology    Status post pancreas transplantation  - Amylase/lipase WNL on AM labs. Repeat ordered on admission  - Pancreas US  satisfactory  - Monitor with daily labs      Pyelonephritis, acute  With GEORGE  UC now predominant for enterococcus, also diphtheroids present  On dapto and cefepime  ID following      Long-term use of immunosuppressant medication  - Continue prograf. Hold cellcept for GI upset. Monitor prograf level daily, monitor for toxic side effects, and adjust for therapeutic dose       Prophylactic immunotherapy  - See long term use of immunosuppressant medication      At risk for opportunistic infections  - Cont OI prophylaxis per protocol.         Status post -donor kidney transplantation  - With ongoing GEORGE  - Had ureteral stent removed 22  - Kidney US showed mild-mod hydro, dc placed  - Repeat US 12/3 with improved hydro and creatinine improved as well  - creat worsened on 22 (with elevated tacro), with sig abdominal pain and n/v, but appears to have urine in bladder despite dc, dc flushing with ease and draining amount flushed    GEORGE (acute kidney injury)  - See s/p kidney transplant      Anemia due to chronic kidney disease  - H/H stable on AM labs  - currently menstruating  - given 1u prbc 12/3/22    Renovascular hypertension  - Hold antihypertensives for now as with hypotension from SIRS vs dehydration  - Assess daily and restart when appropriate            Adam Pettit Jr., MD  Kidney Transplant  [facility]

## 2022-12-04 NOTE — PROGRESS NOTES
Pt escorted off the unit via stretcher for US and CT. No distress noted. PRN zofran for dry heaving.     Dr. Pettit notified of pt's PVR of 283cc. Moncada catheter replaced using sterile technique. Urine return noted, balloon inflated with 10cc sterile NS. Tele placed due to tachycardia.

## 2022-12-04 NOTE — PLAN OF CARE
Attempted to see patient but off the floor  On chart review, Tmax 99.3 overnight  Labs notable for leukocytosis, GEORGE  Follow-up parvo B19, urine cultures  Continue cefepime / daptomycin IV

## 2022-12-04 NOTE — PLAN OF CARE
- Pt AAOx4, afebrile, VSS, tachycardia continues  - Cefepime q12h  - Continuous NS at 100 cc/hr  - ID consulted, vanc d/c. Now on dapto q24h  - Moncada catheter intact draining yellow urine, refer to flowsheets for output. Secured to top of leg with leg strap.   - Midline incision ALY with steri strips intact  - Diarrhea reported overnight. Pending stool sample collection.   - N/V x1, refusing PRN zofran   - No pain reported  - Bed in lowest locked position, call light and personal items in reach, nonskid socks on, verbalized understanding to call for assistance

## 2022-12-04 NOTE — ASSESSMENT & PLAN NOTE
- initial CT A/P without contrast unremarkable   - significant worsening on 12/4 with repeat KUS and CT. US benign, but CT appears to show urine in bladder despite dc  - discussed with surgery  - consulted urology

## 2022-12-04 NOTE — NURSING
Moncada flushed with sterile NS per Dr. Pettit's orders. Pt tolerated well, no pain noted during flushing. PRN morphine given for abd pain.

## 2022-12-04 NOTE — HPI
Ms. Read is a 27 y.o.  female with ESRD secondary to diabetic nephropathy and HTN, now s/p SPK 11/3/22 (Thymo induction, CMV D-/R+). Her post-op course was complicated by urinary retention requiring Dc replacement, now resolved. She was recently admitted to the hospital and discharged 11/29/22 after presenting with nausea/vomiting, GEORGE, and had fever. Infectious work up performed. Urine cx had +multiple organisms none in predominance. Kidney US with edema/possible pyelo, ID consulted. She was initially treated with broad spectrum antibiotics. She was discharged on PO cipro x 7 days at [place]. She then presented to the ED with complaints of ongoing nausea/vomiting, decreased appetite, intermittent abdominal pain.     She had ureteral stent removed outpatient 11/30/22 with Dr. Ruiz. She was in infusion center 12/1/22 to get IVF but had episode of vomiting so she was sent to the ED. She denies dysuria/urgency. She denies history of gastroparesis (has had gastric emptying study in 2020 that was normal). In ED, she is noted to be tachycardic in the 130s, blood pressure 90s, along with low grade fever 100.4. EKG shows sinus tachycardia. Creatinine remains elevated above baseline. Per chart review she had a temp of 103 on 12/2 and 100.7 on 12/3 @0700. Her dc catheter has been draining with a small amount of mucus. Nurses stated dc catheter irrigated well, but when I attempted to irrigate dc catheter I had some difficulty. Upon removing the dc catheter there was a large amount of mucus at the tip. Upsized the cd catheter to 22 Fr and was draining well. Total of ~ 300 cc drained while in the room in bewtween irrigations of old and new dc. Clear yellow urine. Prior to dc placement showed hydronephrosis, dc catheter improved hydro at placement    On assessment, 99.6, tachycardic 129, hypertensive. Cr 3.3 (baseline 1.0), wbc 15.4, hgb 9.8. Urine culture positive for enterococcus.  Currently on Daptomycin and Ganciclovir per ID. Renal US today shows improvement of hydronephrosis. CT shows enlarged transplant kidney, bladder slightly distended despite dc in adequate position, no fluid collections

## 2022-12-04 NOTE — PLAN OF CARE
Pt admitted 12/1 with SIRS (N/V/abd pain). S/p Kidney and pancreas transplant from 11/3/22. Pt AAOx4, VSS on bedside monitor, afebrile (T. Max 99.6F). NST on tele, -130s (Dr. Pettit aware). Standing BP as pt is orthostatic. Urine cultures positive, ID following. Pt receiving Dapto and cefepime. NaBicarb @ 75cc/hr. Moncada replaced today, see notes. Abd CT and US obtained today. CT pending. US showed minimal hydronephrosis. Midline incision healing with steristrips. Pt with abdominal tenderness as well as nausea and vomiting/dry heaving today. PRN zofran given. PRN morphine for pain. Pt unable to tolerate any PO meds/intake today. Pt made NPO this afternoon and urology consult placed. Stool sample sent to rule out c.diff given diarrhea this am. Plan to start ganciclovir tomorrow. Pt is standby assist up to toilet, remains free from falls/injuries so far today. Nonskid socks on, call bell within reach, bed locked and in lowest position. Family at bedside. Pt verbalized understanding to call for needs/assistance.

## 2022-12-04 NOTE — ASSESSMENT & PLAN NOTE
- With ongoing GEORGE  - Had ureteral stent removed 11/30/22  - Kidney US showed mild-mod hydro, dc placed  - Repeat US 12/3 with improved hydro and creatinine improved as well  - creat worsened on 12/4/22 (with elevated tacro), with sig abdominal pain and n/v, but appears to have urine in bladder despite dc, dc flushing with ease and draining amount flushed

## 2022-12-04 NOTE — ASSESSMENT & PLAN NOTE
With GEORGE  UC now predominant for enterococcus, also diphtheroids present  On dapto and cefepime  ID following

## 2022-12-04 NOTE — NURSING
Bladder scan performed per Dr. Pettit's wishes. Largest volume on bladder scan was 235. Dc put out 875 this am. RN also flushed dc this am without issue. Discussed findings with Dr. Pettit and Dr. Stern. Verbal orders to remove Dc for voiding trial. RN to obtain PVR once pt voids after dc removal. STAT abdominal CT ordered. POC reviewed with pt and ongoing.

## 2022-12-04 NOTE — NURSING
"Pt vomited for second time this shift. Said she is unsure if she can keep any morning meds down as she threw up the tylenol given earlier. Zofran given earlier as well. PRN phenergan PO offered but pt stated "I don't know if that would help because I don't feel nauseated, it just feels like something has to come up." Dr. Pettit and Dr. Marte notified of the above. POC ongoing.  "

## 2022-12-04 NOTE — SUBJECTIVE & OBJECTIVE
Subjective:   History of Present Illness:  Ms. Read is a 27 y.o.  female with ESRD secondary to diabetic nephropathy and HTN, now s/p SPK 11/3/22 (Thymo induction, CMV D-/R+). Her post-op course was complicated by urinary retention requiring Moncada replacement, now resolved. She was recently admitted to the hospital and discharged 11/29/22 after presenting with nausea/vomiting, GEORGE, and had fever. Infectious work up performed. Urine cx had +multiple organisms none in predominance. Kidney US with edema/possible pyelo, ID consulted. She was initially treated with broad spectrum antibiotics. She was discharged on PO cipro x 7 days at [place]. She now presents to the ED with complaints of ongoing nausea/vomiting, decreased appetite, intermittent abdominal pain. She had ureteral stent removed outpatient yesterday 11/30/22. She was in infusion center today to give IVF but had episode of vomiting so she was sent to the ED. She denies dysuria/urgency. She denies history of gastroparesis (has had gastric emptying study in 2020 that was normal). In ED, she is noted to be tachycardic in the 130s, blood pressure 90s, along with low grade fever 100.4. EKG shows sinus tachycardia. Creatinine remains elevated above baseline. She is receiving 1L LR bolus in ED. Plan for repeat infectious work up, IVF hydration, IV antibiotics, kidney US, and CT A/P. Will also check CMV PCR.    Ms. Read is a 27 y.o. year old female who is status post Kidney, Pancreas Transplant - 11/3/2022  (#1).    Her maintenance immunosuppression consists of:   Immunosuppressants (From admission, onward)      None            Hospital Course:  Interval history: No acute events overnight. Patient reports she is feeling better this am. Remains afebrile, BP improved. HR in 110's (baseline). Cont IVFs. Cr remains elevated at 3.0. Blood cxs ngtd. UA showed 11 WBCs, moderate bacteria, and 8 hyaline casts. Urine cx in process. Cont cefepime/vanc. Of note,  H/H 6.5/20.0 on am labs, repeat CBC 7.2/22.5. Dc in place for mild-mod hydro seen. Will repeat US tomorrow am. Will continue to monitor.     Interval History:  N/V, severe abdominal pain this morning with rise in creatinine. Urine in bladder despite dc catheter. Catheter replaced after failing voiding trial. Discussed with surgery and CT and repeat KUS ordered.    Past Medical, Surgical, Family, and Social History:   Unchanged from H&P.    Scheduled Meds:   [START ON 12/5/2022] aspirin  81 mg Oral Daily    atorvastatin  40 mg Oral Daily    ceFEPime (MAXIPIME) IVPB  1 g Intravenous Q12H    DAPTOmycin (CUBICIN) IV  10 mg/kg Intravenous Q24H    [START ON 12/5/2022] ganciclovir (CYTOVENE) IVPB  0.625 mg/kg Intravenous Q24H    heparin (porcine)  5,000 Units Subcutaneous Q8H    predniSONE  5 mg Oral Daily    [START ON 12/5/2022] sulfamethoxazole-trimethoprim 400-80mg  1 tablet Oral Every Mon, Wed, Fri    venlafaxine  37.5 mg Oral Daily     Continuous Infusions:   sodium bicarbonate drip       PRN Meds:sodium chloride, sodium chloride, acetaminophen, morphine, ondansetron, promethazine, sodium chloride 0.9%, traMADoL    Intake/Output - Last 3 Shifts         12/02 0700  12/03 0659 12/03 0700  12/04 0659 12/04 0700  12/05 0659    P.O. 300 2297     I.V. (mL/kg)  3052.8 (49.1)     Blood  350     IV Piggyback 99.3 346.7     Total Intake(mL/kg) 399.3 (6.4) 6046.5 (97.2)     Urine (mL/kg/hr) 3350 (2.2) 3500 (2.3) 1275 (2.2)    Emesis/NG output  0 150    Stool 0 0 0    Total Output 3350 3500 1425    Net -2950.7 +2546.5 -1425           Stool Occurrence 1 x 5 x 1 x    Emesis Occurrence  1 x 1 x             Review of Systems   Constitutional:  Positive for fever.   HENT: Negative.     Eyes: Negative.    Respiratory: Negative.     Cardiovascular: Negative.    Gastrointestinal:  Positive for abdominal distention, abdominal pain, nausea and vomiting.   Endocrine: Negative.    Genitourinary:  Positive for pelvic pain.  "  Musculoskeletal: Negative.    Skin: Negative.    Neurological: Negative.    Psychiatric/Behavioral: Negative.      Objective:     Vital Signs (Most Recent):  Temp: 99.2 °F (37.3 °C) (12/04/22 1326)  Pulse: (!) 133 (12/04/22 1431)  Resp: 19 (12/04/22 1610)  BP: (!) 166/103 (12/04/22 1326)  SpO2: 100 % (12/04/22 1326)   Vital Signs (24h Range):  Temp:  [98.1 °F (36.7 °C)-99.3 °F (37.4 °C)] 99.2 °F (37.3 °C)  Pulse:  [117-138] 133  Resp:  [12-20] 19  SpO2:  [95 %-100 %] 100 %  BP: (109-166)/() 166/103     Weight: 62.2 kg (137 lb 2 oz)  Height: 5' 4" (162.6 cm)  Body mass index is 23.54 kg/m².    Physical Exam  Vitals and nursing note reviewed.   Constitutional:       General: She is not in acute distress.     Appearance: She is ill-appearing.   Eyes:      General: No scleral icterus.  Cardiovascular:      Rate and Rhythm: Tachycardia present.   Pulmonary:      Effort: Pulmonary effort is normal. No respiratory distress.   Abdominal:      General: There is distension (suprapubic).      Tenderness: There is abdominal tenderness.   Musculoskeletal:      Right lower leg: No edema.      Left lower leg: No edema.   Skin:     Coloration: Skin is not jaundiced.   Neurological:      Mental Status: She is alert.       Laboratory:  Labs within the past 24 hours have been reviewed.    Diagnostic Results:  CT - Abd/Pelvic: No results found. However, due to the size of the patient record, not all encounters were searched. Please check Results Review for a complete set of results.  US - Kidney: Results for orders placed during the hospital encounter of 12/01/22    US Transplant Kidney With Doppler    Narrative  EXAMINATION:  US TRANSPLANT KIDNEY WITH DOPPLER    CLINICAL HISTORY:  s/p SPK. increase in Cr, severe abdominal pain;    TECHNIQUE:  Transplant renal ultrasound of the right lower quadrant with color flow doppler and duplex analysis.    COMPARISON:  Renal transplant ultrasound 12/03/2022.  " 12/01/2022.    FINDINGS:  Status post right lower quadrant renal transplant on 11/03/2022.  The renal transplant measures 12.5 cm and demonstrates no focal parenchymal abnormality. Minimal transplant hydronephrosis.  No peritransplant fluid.    Renal arterial resistive indices are as follows: Interlobar 0.77 (previously 0.81), segmental Up 0.75 (previously 0.72), segmental Mid 0.77 (previously 0.78), segmental Low 0.76 (previously 0.80).  There is no evidence of a tardus parvus waveform. The maximum velocity in the main renal artery is 236 (previously 187) cm/sec.  Maximum velocity in the main renal vein is 139 (previously 115) centimeters/second.  RA/iliac ratio 0.97    The renal transplant venous system is unremarkable.    Bladder is decompressed around Moncada catheter.    Impression  Minimal transplant hydronephrosis.    Mild interval increased main renal artery and main renal vein peak systolic velocity compared to prior.  Recommend attention on follow-up.    Intraparenchymal resistive indices are improved compared to multiple prior exams.    Electronically signed by resident: Laura Sifuentes  Date:    12/04/2022  Time:    15:10    Electronically signed by: Zulma Anaya MD  Date:    12/04/2022  Time:    16:05

## 2022-12-05 PROBLEM — T38.0X5A STEROID-INDUCED HYPERGLYCEMIA: Status: ACTIVE | Noted: 2022-12-05

## 2022-12-05 PROBLEM — A41.81 SEPSIS DUE TO ENTEROCOCCUS: Status: ACTIVE | Noted: 2022-12-01

## 2022-12-05 PROBLEM — R73.9 STEROID-INDUCED HYPERGLYCEMIA: Status: ACTIVE | Noted: 2022-12-05

## 2022-12-05 LAB
ALBUMIN SERPL BCP-MCNC: 2.3 G/DL (ref 3.5–5.2)
AMYLASE SERPL-CCNC: 35 U/L (ref 20–110)
ANION GAP SERPL CALC-SCNC: 15 MMOL/L (ref 8–16)
BACTERIA #/AREA URNS AUTO: ABNORMAL /HPF
BACTERIA UR CULT: ABNORMAL
BASOPHILS # BLD AUTO: 0.03 K/UL (ref 0–0.2)
BASOPHILS NFR BLD: 0.2 % (ref 0–1.9)
BILIRUB UR QL STRIP: NEGATIVE
BUN SERPL-MCNC: 20 MG/DL (ref 6–20)
C DIFF GDH STL QL: POSITIVE
C DIFF TOX A+B STL QL IA: NEGATIVE
C DIFF TOX GENS STL QL NAA+PROBE: NEGATIVE
CALCIUM SERPL-MCNC: 8.8 MG/DL (ref 8.7–10.5)
CHLORIDE SERPL-SCNC: 104 MMOL/L (ref 95–110)
CLARITY UR REFRACT.AUTO: ABNORMAL
CO2 SERPL-SCNC: 21 MMOL/L (ref 23–29)
COLOR UR AUTO: YELLOW
CREAT SERPL-MCNC: 3.3 MG/DL (ref 0.5–1.4)
DIFFERENTIAL METHOD: ABNORMAL
EOSINOPHIL # BLD AUTO: 0 K/UL (ref 0–0.5)
EOSINOPHIL NFR BLD: 0.2 % (ref 0–8)
ERYTHROCYTE [DISTWIDTH] IN BLOOD BY AUTOMATED COUNT: 15.8 % (ref 11.5–14.5)
EST. GFR  (NO RACE VARIABLE): 18.9 ML/MIN/1.73 M^2
GLUCOSE SERPL-MCNC: 120 MG/DL (ref 70–110)
GLUCOSE UR QL STRIP: ABNORMAL
HCT VFR BLD AUTO: 26.5 % (ref 37–48.5)
HGB BLD-MCNC: 8.9 G/DL (ref 12–16)
HGB UR QL STRIP: NEGATIVE
HYALINE CASTS UR QL AUTO: 0 /LPF
IMM GRANULOCYTES # BLD AUTO: 0.14 K/UL (ref 0–0.04)
IMM GRANULOCYTES NFR BLD AUTO: 0.7 % (ref 0–0.5)
KETONES UR QL STRIP: NEGATIVE
LEUKOCYTE ESTERASE UR QL STRIP: NEGATIVE
LIPASE SERPL-CCNC: 10 U/L (ref 4–60)
LYMPHOCYTES # BLD AUTO: 0.3 K/UL (ref 1–4.8)
LYMPHOCYTES NFR BLD: 1.3 % (ref 18–48)
MAGNESIUM SERPL-MCNC: 1.3 MG/DL (ref 1.6–2.6)
MCH RBC QN AUTO: 30.9 PG (ref 27–31)
MCHC RBC AUTO-ENTMCNC: 33.6 G/DL (ref 32–36)
MCV RBC AUTO: 92 FL (ref 82–98)
MICROSCOPIC COMMENT: ABNORMAL
MONOCYTES # BLD AUTO: 0.5 K/UL (ref 0.3–1)
MONOCYTES NFR BLD: 2.8 % (ref 4–15)
NEUTROPHILS # BLD AUTO: 18.5 K/UL (ref 1.8–7.7)
NEUTROPHILS NFR BLD: 94.8 % (ref 38–73)
NITRITE UR QL STRIP: NEGATIVE
NON-SQ EPI CELLS #/AREA URNS AUTO: 2 /HPF
NRBC BLD-RTO: 0 /100 WBC
PH UR STRIP: 7 [PH] (ref 5–8)
PHOSPHATE SERPL-MCNC: 2.5 MG/DL (ref 2.7–4.5)
PLATELET # BLD AUTO: 394 K/UL (ref 150–450)
PMV BLD AUTO: 10 FL (ref 9.2–12.9)
POCT GLUCOSE: 125 MG/DL (ref 70–110)
POCT GLUCOSE: 137 MG/DL (ref 70–110)
POTASSIUM SERPL-SCNC: 3.2 MMOL/L (ref 3.5–5.1)
PROT UR QL STRIP: ABNORMAL
RBC # BLD AUTO: 2.88 M/UL (ref 4–5.4)
RBC #/AREA URNS AUTO: 21 /HPF (ref 0–4)
SODIUM SERPL-SCNC: 140 MMOL/L (ref 136–145)
SP GR UR STRIP: 1.01 (ref 1–1.03)
SQUAMOUS #/AREA URNS AUTO: 6 /HPF
TACROLIMUS BLD-MCNC: 4.3 NG/ML (ref 5–15)
URN SPEC COLLECT METH UR: ABNORMAL
WBC # BLD AUTO: 19.54 K/UL (ref 3.9–12.7)
WBC #/AREA URNS AUTO: 36 /HPF (ref 0–5)

## 2022-12-05 PROCEDURE — 63600175 PHARM REV CODE 636 W HCPCS: Performed by: INTERNAL MEDICINE

## 2022-12-05 PROCEDURE — 86832 HLA CLASS I HIGH DEFIN QUAL: CPT | Performed by: STUDENT IN AN ORGANIZED HEALTH CARE EDUCATION/TRAINING PROGRAM

## 2022-12-05 PROCEDURE — 86977 RBC SERUM PRETX INCUBJ/INHIB: CPT | Mod: 59 | Performed by: STUDENT IN AN ORGANIZED HEALTH CARE EDUCATION/TRAINING PROGRAM

## 2022-12-05 PROCEDURE — 85025 COMPLETE CBC W/AUTO DIFF WBC: CPT | Performed by: PHYSICIAN ASSISTANT

## 2022-12-05 PROCEDURE — 25000003 PHARM REV CODE 250: Performed by: PHYSICIAN ASSISTANT

## 2022-12-05 PROCEDURE — 25000003 PHARM REV CODE 250: Performed by: INTERNAL MEDICINE

## 2022-12-05 PROCEDURE — 87086 URINE CULTURE/COLONY COUNT: CPT | Performed by: PHYSICIAN ASSISTANT

## 2022-12-05 PROCEDURE — 83690 ASSAY OF LIPASE: CPT | Performed by: PHYSICIAN ASSISTANT

## 2022-12-05 PROCEDURE — 94761 N-INVAS EAR/PLS OXIMETRY MLT: CPT

## 2022-12-05 PROCEDURE — 20600001 HC STEP DOWN PRIVATE ROOM

## 2022-12-05 PROCEDURE — 99232 SBSQ HOSP IP/OBS MODERATE 35: CPT | Mod: ,,, | Performed by: NURSE PRACTITIONER

## 2022-12-05 PROCEDURE — 86747 PARVOVIRUS ANTIBODY: CPT | Performed by: INTERNAL MEDICINE

## 2022-12-05 PROCEDURE — 99233 SBSQ HOSP IP/OBS HIGH 50: CPT | Mod: GC,,, | Performed by: INTERNAL MEDICINE

## 2022-12-05 PROCEDURE — 99232 PR SUBSEQUENT HOSPITAL CARE,LEVL II: ICD-10-PCS | Mod: ,,, | Performed by: NURSE PRACTITIONER

## 2022-12-05 PROCEDURE — 99900035 HC TECH TIME PER 15 MIN (STAT)

## 2022-12-05 PROCEDURE — 87507 IADNA-DNA/RNA PROBE TQ 12-25: CPT | Performed by: INTERNAL MEDICINE

## 2022-12-05 PROCEDURE — 99233 SBSQ HOSP IP/OBS HIGH 50: CPT | Mod: ,,, | Performed by: PHYSICIAN ASSISTANT

## 2022-12-05 PROCEDURE — 63600175 PHARM REV CODE 636 W HCPCS: Mod: JG | Performed by: PHYSICIAN ASSISTANT

## 2022-12-05 PROCEDURE — 81001 URINALYSIS AUTO W/SCOPE: CPT | Performed by: PHYSICIAN ASSISTANT

## 2022-12-05 PROCEDURE — 83735 ASSAY OF MAGNESIUM: CPT | Performed by: PHYSICIAN ASSISTANT

## 2022-12-05 PROCEDURE — 80197 ASSAY OF TACROLIMUS: CPT | Performed by: PHYSICIAN ASSISTANT

## 2022-12-05 PROCEDURE — 99233 PR SUBSEQUENT HOSPITAL CARE,LEVL III: ICD-10-PCS | Mod: GC,,, | Performed by: INTERNAL MEDICINE

## 2022-12-05 PROCEDURE — 80069 RENAL FUNCTION PANEL: CPT | Performed by: PHYSICIAN ASSISTANT

## 2022-12-05 PROCEDURE — 63600175 PHARM REV CODE 636 W HCPCS: Performed by: PHYSICIAN ASSISTANT

## 2022-12-05 PROCEDURE — 82150 ASSAY OF AMYLASE: CPT | Performed by: PHYSICIAN ASSISTANT

## 2022-12-05 PROCEDURE — 99233 PR SUBSEQUENT HOSPITAL CARE,LEVL III: ICD-10-PCS | Mod: ,,, | Performed by: PHYSICIAN ASSISTANT

## 2022-12-05 PROCEDURE — 86833 HLA CLASS II HIGH DEFIN QUAL: CPT | Performed by: STUDENT IN AN ORGANIZED HEALTH CARE EDUCATION/TRAINING PROGRAM

## 2022-12-05 RX ORDER — SODIUM CHLORIDE 9 MG/ML
INJECTION, SOLUTION INTRAVENOUS CONTINUOUS
Status: DISCONTINUED | OUTPATIENT
Start: 2022-12-05 | End: 2022-12-09

## 2022-12-05 RX ORDER — MAGNESIUM SULFATE HEPTAHYDRATE 40 MG/ML
4 INJECTION, SOLUTION INTRAVENOUS ONCE
Status: COMPLETED | OUTPATIENT
Start: 2022-12-05 | End: 2022-12-05

## 2022-12-05 RX ORDER — INSULIN ASPART 100 [IU]/ML
0-5 INJECTION, SOLUTION INTRAVENOUS; SUBCUTANEOUS EVERY 6 HOURS PRN
Status: DISCONTINUED | OUTPATIENT
Start: 2022-12-05 | End: 2022-12-06

## 2022-12-05 RX ORDER — TACROLIMUS 1 MG/1
4 CAPSULE ORAL 2 TIMES DAILY
Status: DISCONTINUED | OUTPATIENT
Start: 2022-12-05 | End: 2022-12-05

## 2022-12-05 RX ORDER — TACROLIMUS 1 MG/1
2 CAPSULE ORAL 2 TIMES DAILY
Status: DISCONTINUED | OUTPATIENT
Start: 2022-12-05 | End: 2022-12-06

## 2022-12-05 RX ORDER — MUPIROCIN 20 MG/G
OINTMENT TOPICAL 2 TIMES DAILY
Status: DISPENSED | OUTPATIENT
Start: 2022-12-06 | End: 2022-12-11

## 2022-12-05 RX ORDER — SODIUM CHLORIDE 9 MG/ML
INJECTION, SOLUTION INTRAVENOUS CONTINUOUS
Status: DISCONTINUED | OUTPATIENT
Start: 2022-12-05 | End: 2022-12-05

## 2022-12-05 RX ORDER — GLUCAGON 1 MG
1 KIT INJECTION
Status: DISCONTINUED | OUTPATIENT
Start: 2022-12-05 | End: 2022-12-06

## 2022-12-05 RX ORDER — SODIUM CHLORIDE 9 MG/ML
INJECTION, SOLUTION INTRAVENOUS
Status: DISCONTINUED | OUTPATIENT
Start: 2022-12-05 | End: 2022-12-15 | Stop reason: HOSPADM

## 2022-12-05 RX ADMIN — PIPERACILLIN SODIUM AND TAZOBACTAM SODIUM 4.5 G: 4; .5 INJECTION, POWDER, LYOPHILIZED, FOR SOLUTION INTRAVENOUS at 03:12

## 2022-12-05 RX ADMIN — SODIUM CHLORIDE: 0.9 INJECTION, SOLUTION INTRAVENOUS at 10:12

## 2022-12-05 RX ADMIN — TACROLIMUS 2 MG: 1 CAPSULE ORAL at 06:12

## 2022-12-05 RX ADMIN — GANCICLOVIR SODIUM 39 MG: 50 INJECTION, POWDER, LYOPHILIZED, FOR SOLUTION INTRAVENTRICULAR at 12:12

## 2022-12-05 RX ADMIN — PROMETHAZINE HYDROCHLORIDE 6.25 MG: 25 INJECTION INTRAMUSCULAR; INTRAVENOUS at 05:12

## 2022-12-05 RX ADMIN — ACETAMINOPHEN 650 MG: 325 TABLET ORAL at 05:12

## 2022-12-05 RX ADMIN — PIPERACILLIN SODIUM AND TAZOBACTAM SODIUM 4.5 G: 4; .5 INJECTION, POWDER, LYOPHILIZED, FOR SOLUTION INTRAVENOUS at 08:12

## 2022-12-05 RX ADMIN — METHYLPREDNISOLONE SODIUM SUCCINATE 20 MG: 40 INJECTION, POWDER, FOR SOLUTION INTRAMUSCULAR; INTRAVENOUS at 10:12

## 2022-12-05 RX ADMIN — MICAFUNGIN SODIUM 100 MG: 100 INJECTION, POWDER, LYOPHILIZED, FOR SOLUTION INTRAVENOUS at 10:12

## 2022-12-05 RX ADMIN — MAGNESIUM SULFATE 4 G: 2 INJECTION INTRAVENOUS at 08:12

## 2022-12-05 RX ADMIN — DAPTOMYCIN 625 MG: 350 INJECTION, POWDER, LYOPHILIZED, FOR SOLUTION INTRAVENOUS at 01:12

## 2022-12-05 RX ADMIN — TACROLIMUS 2 MG: 1 CAPSULE ORAL at 10:12

## 2022-12-05 RX ADMIN — ONDANSETRON 4 MG: 2 INJECTION INTRAMUSCULAR; INTRAVENOUS at 03:12

## 2022-12-05 NOTE — ASSESSMENT & PLAN NOTE
- Hold antihypertensives for now as with hypotension from SEPSIS vs dehydration  - Assess daily and restart when appropriate

## 2022-12-05 NOTE — PROGRESS NOTES
[facility]  Urology  Progress Note    Patient Name: Isabela Read  MRN: [id number]  Admission Date: 12/1/2022  Hospital Length of Stay: 3 days  Code Status: Full Code   Attending Provider: Elmira Celis,*   Primary Care Physician: Tavo Bhatia MD    Subjective:     HPI:  Ms. Read is a 27 y.o.  female with ESRD secondary to diabetic nephropathy and HTN, now s/p SPK 11/3/22 (Thymo induction, CMV D-/R+). Her post-op course was complicated by urinary retention requiring Dc replacement, now resolved. She was recently admitted to the hospital and discharged 11/29/22 after presenting with nausea/vomiting, GEORGE, and had fever. Infectious work up performed. Urine cx had +multiple organisms none in predominance. Kidney US with edema/possible pyelo, ID consulted. She was initially treated with broad spectrum antibiotics. She was discharged on PO cipro x 7 days at [place]. She then presented to the ED with complaints of ongoing nausea/vomiting, decreased appetite, intermittent abdominal pain.     She had ureteral stent removed outpatient 11/30/22 with Dr. Ruiz. She was in infusion center 12/1/22 to get IVF but had episode of vomiting so she was sent to the ED. She denies dysuria/urgency. She denies history of gastroparesis (has had gastric emptying study in 2020 that was normal). In ED, she is noted to be tachycardic in the 130s, blood pressure 90s, along with low grade fever 100.4. EKG shows sinus tachycardia. Creatinine remains elevated above baseline. Per chart review she had a temp of 103 on 12/2 and 100.7 on 12/3 @0700. Her dc catheter has been draining with a small amount of mucus. Nurses stated dc catheter irrigated well, but when I attempted to irrigate dc catheter I had some difficulty. Upon removing the dc catheter there was a large amount of mucus at the tip. Upsized the dc catheter to 22 Fr and was draining well. Total of ~ 300 cc drained  while in the room in bewtween irrigations of old and new dc. Clear yellow urine. Prior to dc placement showed hydronephrosis, dc catheter improved hydro at placement    On assessment, 99.6, tachycardic 129, hypertensive. Cr 3.3 (baseline 1.0), wbc 15.4, hgb 9.8. Urine culture positive for enterococcus. Currently on Daptomycin and Ganciclovir per ID. Renal US today shows improvement of hydronephrosis. CT shows enlarged transplant kidney, bladder slightly distended despite dc in adequate position, no fluid collections      Interval History: Febrile overnight 102.6 most recently. UOP 1275/1275/625. Clear yellow. Labs pending. Cooling blanket in place. Abx changed. New blood and urine culture pending. C Diff toxin positive.     Review of Systems  Objective:     Temp:  [98.4 °F (36.9 °C)-103.1 °F (39.5 °C)] 102.6 °F (39.2 °C)  Pulse:  [104-143] 122  Resp:  [16-20] 18  SpO2:  [98 %-100 %] 98 %  BP: (126-179)/() 126/68     Body mass index is 23.54 kg/m².      Bladder Scan Volume (mL): 283 mL (12/04/22 1248)  Post Void Cath Residual Output (mL): 230 mL (12/01/22 2320)    Drains       Drain  Duration                  Hemodialysis AV Fistula Left upper arm -- days         Urethral Catheter 12/04/22 1350 <1 day                    Physical Exam  Constitutional:       General: She is not in acute distress.     Appearance: Normal appearance.   HENT:      Head: Normocephalic.   Eyes:      Pupils: Pupils are equal, round, and reactive to light.   Cardiovascular:      Rate and Rhythm: Normal rate.   Pulmonary:      Effort: Pulmonary effort is normal. No respiratory distress.   Chest:      Chest wall: No tenderness.   Abdominal:      General: Abdomen is flat. There is no distension.      Palpations: Abdomen is soft.      Tenderness: There is abdominal tenderness (suprapubic tenderness). There is no right CVA tenderness or left CVA tenderness.   Genitourinary:     Comments: 22 Fr dc catheter draining clear yellow  urine  Musculoskeletal:         General: Normal range of motion.      Cervical back: Normal range of motion.   Neurological:      General: No focal deficit present.      Mental Status: She is alert and oriented to person, place, and time.   Psychiatric:         Mood and Affect: Mood normal.         Behavior: Behavior normal.       Significant Labs:    BMP:  Recent Labs   Lab 12/04/22  0636 12/04/22  1814 12/04/22  2044    139  139 141   K 3.4* 2.6*  2.6* 3.2*    89*  89* 108   CO2 20* 40*  40* 21*   BUN 21* 17  17 19   CREATININE 3.3* 3.2*  3.2* 3.5*   CALCIUM 9.4 6.9*  6.9* 8.7       CBC:   Recent Labs   Lab 12/03/22  0622 12/04/22  0636 12/04/22  1814   WBC 10.25 15.36* 17.30*   HGB 6.8* 9.8* 8.8*   HCT 21.0* 30.2* 26.0*    452* 405       All pertinent labs results from the past 24 hours have been reviewed.    Significant Imaging:  All pertinent imaging results/findings from the past 24 hours have been reviewed.                    Assessment/Plan:     GEORGE (acute kidney injury)  - CT does not show definitive hydronephrosis per review of image and on call radiology read. Us shows improved hydronephrosis  - Dc catheter up-sized and irrigated well. Draining well this morning.  - Continue IV abx. ID on board  - C.diff toxin positive  - mIVF  - Strict I&O's  - avoid nephrotoxic medications  - trend Cr. Pending this am. If Cr does not improve in the morning with a well draining dc catheter, discussion for nephrostomy tube should be considered vs renal biopsy          VTE Risk Mitigation (From admission, onward)         Ordered     heparin (porcine) injection 5,000 Units  Every 8 hours         12/01/22 1607     IP VTE HIGH RISK PATIENT  Once         12/01/22 1607     Place sequential compression device  Until discontinued         12/01/22 1607                Papi Garza MD  Urology  [facility]

## 2022-12-05 NOTE — ASSESSMENT & PLAN NOTE
- With GEORGE  - UC now predominant for enterococcus, also diphtheroids present  - On zosyn, added luis carlos, and stopped dapto  - Repeat urine cx in process

## 2022-12-05 NOTE — PROGRESS NOTES
12/04/22 1930   Vital Signs   Temp (!) 103.1 °F (39.5 °C)   Temp src Oral   Pulse (!) 143   SpO2 99 %   O2 Device (Oxygen Therapy) room air   BP (!) 179/82   MAP (mmHg) 118   BP Location Right arm   BP Method Automatic   Patient Position Standing     Team made aware of pt's vitals. PRN tylenol administered, but pt immediately vomited following administration. PRN zofran administered and tylenol suppository ordered from pharmacy. Cooling blanket ordered from cleaning and distribution.

## 2022-12-05 NOTE — ASSESSMENT & PLAN NOTE
- Cr 3.3 from 3.5  - DSA's in process  - Tentative plan for kidney biopsy Wednesday 12/7  - Had ureteral stent removed 11/30/22  - Kidney US showed mild-mod hydro, dc placed  - Repeat US 12/3 with improved hydro and creatinine improved as well  - Dc upsized by urology due CT showing enlarged transplant kidney and distended bladder despite dc in adequate position.

## 2022-12-05 NOTE — NURSING
PIV in R hand infiltrated. PIV removed. Sodium bicarb stopped for administration of micafungin (non compatible) through midline access. FLORIN Alexander aware and documented in MAR. Hand elevated on pillow at this time. Pt reports no discomfort.

## 2022-12-05 NOTE — NURSING
Dr. Mcintyre and team to visit, plan of care reviewed.Aware that patient was unable to take po yesterday and routes changed

## 2022-12-05 NOTE — CONSULTS
[facility]  Urology  Consult Note    Patient Name: Isabela Read  MRN: [id number]  Admission Date: 12/1/2022  Hospital Length of Stay: 2   Code Status: Full Code   Attending Provider: Elmira Celis,*   Consulting Provider: Papi Garza MD  Primary Care Physician: Tavo Bhatia MD  Principal Problem:SIRS (systemic inflammatory response syndrome)    Inpatient consult to Urology  Consult performed by: Papi Garza MD  Consult ordered by: Adam Pettit Jr., MD          Subjective:     HPI:  Ms. Read is a 27 y.o.  female with ESRD secondary to diabetic nephropathy and HTN, now s/p SPK 11/3/22 (Thymo induction, CMV D-/R+). Her post-op course was complicated by urinary retention requiring Dc replacement, now resolved. She was recently admitted to the hospital and discharged 11/29/22 after presenting with nausea/vomiting, GEORGE, and had fever. Infectious work up performed. Urine cx had +multiple organisms none in predominance. Kidney US with edema/possible pyelo, ID consulted. She was initially treated with broad spectrum antibiotics. She was discharged on PO cipro x 7 days at [place]. She then presented to the ED with complaints of ongoing nausea/vomiting, decreased appetite, intermittent abdominal pain.     She had ureteral stent removed outpatient 11/30/22 with Dr. Ruiz. She was in infusion center 12/1/22 to get IVF but had episode of vomiting so she was sent to the ED. She denies dysuria/urgency. She denies history of gastroparesis (has had gastric emptying study in 2020 that was normal). In ED, she is noted to be tachycardic in the 130s, blood pressure 90s, along with low grade fever 100.4. EKG shows sinus tachycardia. Creatinine remains elevated above baseline. Per chart review she had a temp of 103 on 12/2 and 100.7 on 12/3 @0700. Her dc catheter has been draining with a small amount of mucus. Nurses stated dc catheter irrigated well, but  when I attempted to irrigate dc catheter I had some difficulty. Upon removing the dc catheter there was a large amount of mucus at the tip. Upsized the dc catheter to 22 Fr and was draining well. Total of ~ 300 cc drained while in the room in bewtween irrigations of old and new dc. Clear yellow urine. Prior to dc placement showed hydronephrosis, dc catheter improved hydro at placement    On assessment, 99.6, tachycardic 129, hypertensive. Cr 3.3 (baseline 1.0), wbc 15.4, hgb 9.8. Urine culture positive for enterococcus. Currently on Daptomycin and Ganciclovir per ID. Renal US today shows improvement of hydronephrosis. CT shows enlarged transplant kidney, bladder slightly distended despite dc in adequate position, no fluid collections      Past Medical History:   Diagnosis Date    Acute kidney injury superimposed on chronic kidney disease 4/7/2021    Anemia     Chronic hypertension with exacerbation during pregnancy in second trimester 11/6/2020    Current regimen (11/6/20):  - Carvedilol 12.5 mg BID - Nifedipine 30 mg daily Baseline CKD + proteinuria    Chronic kidney disease     Depression     Diabetes mellitus     Diabetic retinopathy     Diarrhea     Encounter for blood transfusion     End stage renal failure on dialysis     Gastroparesis     Glaucoma     Hepatomegaly 4/29/2021    Hx of psychiatric care     Hyperlipidemia     Hypertension     Nephrotic syndrome     Nephrotic syndrome 3/4/2021    Nonspecific reaction to tuberculin skin test without active tuberculosis 10/1/2021    Palpitations     Poor fetal growth affecting management of mother in second trimester 10/15/2020    Restrictive lung disease     Retinal detachment     Severe pre-eclampsia in second trimester 11/6/2020    Type 1 diabetes mellitus with end-stage renal disease (ESRD) 2/24/2015    Followed by Dr. Mayo.  Last A1C was 13  Currently on lantus 20 units qAM and humolog 10 units with meals  - a fetal echocardiogram around  22-24 weeks - needs eye exam, podiatry exam  - needs EKG, maternal echo and fu with cardiology  - ASA 81mg, folic acid 4mg PO daily - hemoglobin A1c every 4-6 weeks -24 hour urine for protein and creatinine clearance should be performed. Patient will also need a       Past Surgical History:   Procedure Laterality Date    AV FISTULA PLACEMENT Left 04/07/2021    Procedure: CREATION, AV FISTULA;  Surgeon: Roberto Ryan MD;  Location: [facility] OR [address] [place] [address];  Service: Peripheral Vascular;  Laterality: Left;    COLONOSCOPY N/A 03/16/2022    Procedure: COLONOSCOPY;  Surgeon: Tavo Kwok MD;  Location: [facility] ENDO (4TH FLR);  Service: Endoscopy;  Laterality: N/A;  Questionable history of delayed gastric emptying longstanding diabetes now on eating pre transplant workup for history of nausea vomiting which seems to have improved with dialysis also chronic diarrhea and history of anemia pre transplant workup for kidney transplant. 3    CYSTOSCOPY      ESOPHAGOGASTRODUODENOSCOPY N/A 03/16/2022    Procedure: EGD (ESOPHAGOGASTRODUODENOSCOPY);  Surgeon: Tavo Kwok MD;  Location: [facility] ENDO (4TH FLR);  Service: Endoscopy;  Laterality: N/A;  Questionable history of delayed gastric emptying longstanding diabetes now on eating pre transplant workup for history of nausea vomiting which seems to have improved with dialysis also chronic diarrhea and history of anemia pre transplant workup for    FISTULOGRAM N/A 08/11/2021    Procedure: Fistulogram;  Surgeon: Roberto Ryan MD;  Location: [facility] CATH LAB;  Service: Cardiology;  Laterality: N/A;    KIDNEY TRANSPLANT Right 11/02/2022    Procedure: TRANSPLANT, KIDNEY;  Surgeon: Kumar Rodriges Jr., MD;  [facility];  Service: Transplant;  Laterality: Right;    LASER PHOTOCOAGULATION OF RETINA Right 05/31/2022    Procedure: PHOTOCOAGULATION, RETINA, USING LASER;  Surgeon: Maximilian Montaño MD;  Location: [facility] OR 1ST FLR;  Service: Ophthalmology;   Laterality: Right;    PERCUTANEOUS TRANSLUMINAL ANGIOPLASTY OF ARTERIOVENOUS FISTULA N/A 08/11/2021    Procedure: PTA, AV FISTULA;  Surgeon: Roberto Ryan MD;  [place] [facility];  Service: Cardiology;  Laterality: N/A;    REMOVAL IMPLANT, POSTERIOR SEGMENT, INTRAOCULAR Right 02/01/2022    Procedure: REMOVAL IMPLANT, POSTERIOR SEGMENT, INTRAOCULAR;  Surgeon: Maximilian Montaño MD;  Location: [facility] OR 1ST FLR;  Service: Ophthalmology;  Laterality: Right;    REPAIR OF RETINAL DETACHMENT WITH VITRECTOMY Right 01/25/2022    Procedure: REPAIR, RETINAL DETACHMENT, WITH VITRECTOMY, MEMBRANE PEEL, LASER, INJECTION OF GAS VS OIL;  Surgeon: Maximilian Montaño MD;  Location: [facility] [place] 1ST FLR;  Service: Ophthalmology;  Laterality: Right;    REVISION OF ARTERIOVENOUS FISTULA Left 12/10/2021    Procedure: REVISION, AV FISTULA with BVT;  Surgeon: Roberto Ryan MD;  Location: [facility] OR 2ND FLR;  Service: Peripheral Vascular;  Laterality: Left;    TRANSPLANTATION OF PANCREAS N/A 11/02/2022    Procedure: TRANSPLANT, PANCREAS;  Surgeon: Kumar Rodriges Jr., MD;  Location: [facility] OR 2ND FLR;  Service: Transplant;  Laterality: N/A;    VITRECTOMY BY PARS PLANA APPROACH Right 02/01/2022    Procedure: VITRECTOMY, PARS PLANA APPROACH;  Surgeon: Maximilian Montaño MD;  [facility];  Service: Ophthalmology;  Laterality: Right;    VITRECTOMY BY PARS PLANA APPROACH Right 05/31/2022    Procedure: VITRECTOMY, PARS PLANA APPROACH;  Surgeon: Maximilian Montaño MD;  [facility];  Service: Ophthalmology;  Laterality: Right;       Review of patient's allergies indicates:  No Known Allergies    Family History       Problem Relation (Age of Onset)    Diabetes Brother    Heart disease Father    Hypertension Mother            Tobacco Use    Smoking status: Never    Smokeless tobacco: Never   Substance and Sexual Activity    Alcohol use: No    Drug use: No    Sexual activity: Not Currently     Partners:  Male     Comment: monogamous       Review of Systems   Constitutional:  Negative for chills and fever.   Genitourinary:  Positive for hematuria. Negative for dysuria and frequency.     Objective:     Temp:  [98.1 °F (36.7 °C)-99.6 °F (37.6 °C)] 99.6 °F (37.6 °C)  Pulse:  [117-138] 129  Resp:  [12-20] 18  SpO2:  [95 %-100 %] 98 %  BP: (109-166)/() 160/86     Body mass index is 23.54 kg/m².    Date 12/04/22 0700 - 12/05/22 0659   Shift 3543-1527 8217-9581 7516-4546 24 Hour Total   INTAKE   I.V.(mL/kg)  1921.9(30.9)  1921.9(30.9)   IV Piggyback  104.7  104.7   Shift Total(mL/kg)  2026.6(32.6)  2026.6(32.6)   OUTPUT   Urine(mL/kg/hr) 1275(2.6) 450  1725   Emesis/NG output 150   150   Shift Total(mL/kg) 1425(22.9) 450(7.2)  1875(30.1)   Weight (kg) 62.2 62.2 62.2 62.2     Bladder Scan Volume (mL): 283 mL (12/04/22 1248)  Post Void Cath Residual Output (mL): 230 mL (12/01/22 2320)    Drains       Drain  Duration                  Hemodialysis AV Fistula Left upper arm -- days         Urethral Catheter 12/04/22 1350 <1 day                    Physical Exam  Constitutional:       General: She is not in acute distress.     Appearance: Normal appearance.   HENT:      Head: Normocephalic.   Eyes:      Pupils: Pupils are equal, round, and reactive to light.   Cardiovascular:      Rate and Rhythm: Normal rate.   Pulmonary:      Effort: Pulmonary effort is normal. No respiratory distress.   Chest:      Chest wall: No tenderness.   Abdominal:      General: Abdomen is flat. There is no distension.      Palpations: Abdomen is soft.      Tenderness: There is abdominal tenderness (suprapubic tenderness). There is no right CVA tenderness or left CVA tenderness.   Genitourinary:     Comments: 22 Fr dc catheter draining clear yellow urine  Musculoskeletal:         General: Normal range of motion.      Cervical back: Normal range of motion.   Neurological:      General: No focal deficit present.      Mental Status: She is alert and  oriented to person, place, and time.   Psychiatric:         Mood and Affect: Mood normal.         Behavior: Behavior normal.       Significant Labs:    BMP:  Recent Labs   Lab 12/02/22 0622 12/03/22 0622 12/04/22 0636   * 135* 141   K 3.7 3.6 3.4*    107 109   CO2 19* 18* 20*   BUN 22* 17 21*   CREATININE 3.0* 2.3* 3.3*   CALCIUM 8.7 8.6* 9.4       CBC:  Recent Labs   Lab 12/02/22  0913 12/03/22 0622 12/04/22 0636   WBC 9.27 10.25 15.36*   HGB 7.2* 6.8* 9.8*   HCT 22.5* 21.0* 30.2*    391 452*       All pertinent labs results from the past 24 hours have been reviewed.    Significant Imaging:  All pertinent imaging results/findings from the past 24 hours have been reviewed.                      Assessment and Plan:     GEORGE (acute kidney injury)  - CT does not show definitive hydronephrosis per review of image and on call radiology read. Us shows improved hydronephrosis  - Dc catheter up-sized and irrigated well. Small amount of mucus likely plugging initial dc catheter. Now draining well  - Continue IV abx. ID on board  - mIVF  - Strict I&O's  - avoid nephrotoxic medications  - trend Cr. If Cr does not improve in the morning with a well draining dc catheter, discussion for nephrostomy tube or renal biopsy should be considered  - Patient will need outpatient urology f/u to evaluate urinary retention          VTE Risk Mitigation (From admission, onward)           Ordered     heparin (porcine) injection 5,000 Units  Every 8 hours         12/01/22 1607     IP VTE HIGH RISK PATIENT  Once         12/01/22 1607     Place sequential compression device  Until discontinued         12/01/22 1607                    Thank you for your consult. I will follow-up with patient. Please contact us if you have any additional questions.    Papi Garza MD  Urology  [facility]    Agree with the above.

## 2022-12-05 NOTE — PLAN OF CARE
- Pt remains AAOx4  - T max 103.1 F orally. Refer to previous notes. Initially took PO tylenol but vomited immediately after administration. 1 x dose tylenol suppository ordered, but did not give as pt then tolerated PO tylenol after administration of PRN zofran x1. Cooling blanket utilized x 2 hrs until removed per pt request and oral temp reached 98.4 F.   - Temp spiked again around 0300, refer to notes. Cooling blanket applied. PRN tylenol administered. Temp is down to 100.3 F at time of note.   - Cefepime and vanc d/c. Now on dapto q24h, micafungin q24h, zosyn q8h. Ganciclovir q24h to begin this morning.   - Stool sample + for C Diff antigen, C Diff PCR negative.   - K 3.2, replaced   - Sodium bicarb at 75 cc/hr continued  - R UA midline placed. No blood return noted. Dressing C/D/I  - L UA fistula +/+  - Moncada catheter placed by urology, secured with leg strap. Irrigated x1 overnight, refer to previous notes. Mucus noted in tubing. UA and urine culture collected.   - Blood cultures in process  - R hand remains slightly edematous d/t PIV infiltrating. Hand elevated on pillow. Refer to previous notes  - Tele ST 110s-140s. Team aware.   - Pt aware of PRN pain medications if needed, none administered overnight  - Midline incision ALY with steri strips  - NPO except ice chips per FLORIN Alexander. Pt noted to be frustrated regarding diet orders.   - Mother at bedside overnight  - Bed in lowest locked position, call light and personal items in reach, nonskid socks on, verbalized understanding to call for assistance.

## 2022-12-05 NOTE — ASSESSMENT & PLAN NOTE
- H/H stable on AM labs  - currently menstruating  - given 1u prbc 12/3/22  - Parvo pending  - Iron studies ordered for the am

## 2022-12-05 NOTE — NURSING
FLORIN Alexander made aware of temperature of 101.4 F orally,  on tele, and N/V. PRN zofran administered and cooling blanket applied. Too early for PRN tylenol administration. No new orders received at this time.

## 2022-12-05 NOTE — SUBJECTIVE & OBJECTIVE
Interval History: Febrile overnight 102.6 most recently. UOP 1275/1275/625. Clear yellow. Labs pending. Cooling blanket in place. Abx changed. New blood and urine culture pending. C Diff toxin positive.     Review of Systems  Objective:     Temp:  [98.4 °F (36.9 °C)-103.1 °F (39.5 °C)] 102.6 °F (39.2 °C)  Pulse:  [104-143] 122  Resp:  [16-20] 18  SpO2:  [98 %-100 %] 98 %  BP: (126-179)/() 126/68     Body mass index is 23.54 kg/m².      Bladder Scan Volume (mL): 283 mL (12/04/22 1248)  Post Void Cath Residual Output (mL): 230 mL (12/01/22 2320)    Drains       Drain  Duration                  Hemodialysis AV Fistula Left upper arm -- days         Urethral Catheter 12/04/22 1350 <1 day                    Physical Exam  Constitutional:       General: She is not in acute distress.     Appearance: Normal appearance.   HENT:      Head: Normocephalic.   Eyes:      Pupils: Pupils are equal, round, and reactive to light.   Cardiovascular:      Rate and Rhythm: Normal rate.   Pulmonary:      Effort: Pulmonary effort is normal. No respiratory distress.   Chest:      Chest wall: No tenderness.   Abdominal:      General: Abdomen is flat. There is no distension.      Palpations: Abdomen is soft.      Tenderness: There is abdominal tenderness (suprapubic tenderness). There is no right CVA tenderness or left CVA tenderness.   Genitourinary:     Comments: 22 Fr dc catheter draining clear yellow urine  Musculoskeletal:         General: Normal range of motion.      Cervical back: Normal range of motion.   Neurological:      General: No focal deficit present.      Mental Status: She is alert and oriented to person, place, and time.   Psychiatric:         Mood and Affect: Mood normal.         Behavior: Behavior normal.       Significant Labs:    BMP:  Recent Labs   Lab 12/04/22  0636 12/04/22  1814 12/04/22  2044    139  139 141   K 3.4* 2.6*  2.6* 3.2*    89*  89* 108   CO2 20* 40*  40* 21*   BUN 21* 17  17 19    CREATININE 3.3* 3.2*  3.2* 3.5*   CALCIUM 9.4 6.9*  6.9* 8.7       CBC:   Recent Labs   Lab 12/03/22  0622 12/04/22  0636 12/04/22  1814   WBC 10.25 15.36* 17.30*   HGB 6.8* 9.8* 8.8*   HCT 21.0* 30.2* 26.0*    452* 405       All pertinent labs results from the past 24 hours have been reviewed.    Significant Imaging:  All pertinent imaging results/findings from the past 24 hours have been reviewed.

## 2022-12-05 NOTE — NURSING
"Dr. Vivar to visit, informed that patient unable to take po meds (" cannot take on empty stomach ") routes to be adjusted.  "

## 2022-12-05 NOTE — ASSESSMENT & PLAN NOTE
- CT does not show definitive hydronephrosis per review of image and on call radiology read. Us shows improved hydronephrosis  - Dc catheter up-sized and irrigated well. Small amount of mucus likely plugging initial dc catheter  - Continue IV abx. ID on board  - mIVF  - Strict I&O's  - avoid nephrotoxic medications  - trend Cr. If Cr does not improve in the morning with a well draining dc catheter, discussion for nephrostomy tube should be considered

## 2022-12-05 NOTE — ASSESSMENT & PLAN NOTE
- CT does not show definitive hydronephrosis per review of image and on call radiology read. Us shows improved hydronephrosis  - Dc catheter up-sized and irrigated well. Draining well this morning.  - Continue IV abx. ID on board  - C.diff toxin positive  - mIVF  - Strict I&O's  - avoid nephrotoxic medications  - trend Cr. Pending this am. If Cr does not improve in the morning with a well draining dc catheter, discussion for nephrostomy tube should be considered vs renal biopsy

## 2022-12-05 NOTE — CONSULTS
[facility] - [facility] Step[facility]  Endocrinology  Diabetes Consult Note    Consult Requested by: Melani Mcintyre MD   Reason for admit: Sepsis due to Enterococcus    HISTORY OF PRESENT ILLNESS:  Admit Date: 12/1/22      Reason for Consult: Management of T1DM, Hyperglycemia      Surgical Procedure and Date: Kidney/Pancreas Transplant 11/3/2022     Diabetes diagnosis year: 8 yrs ago     Home Diabetes Medications:  none since pancreas transplant on 11/3/22     How often checking glucose at home? Not checking   Hypoglycemia on the regimen?  No  Missed doses on regimen?  N/A      Diabetes Complications include:     Diabetic nephropathy       Complicating diabetes co morbidities:   ESRD        HPI:    Patient is a 27 y.o. female with ESRD secondary to diabetic nephropathy and HTN, now s/p SPK 11/3/22 (Thymo induction, CMV D-/R+). Her post-op course was complicated by urinary retention requiring Moncada replacement, now resolved. She was recently admitted to the hospital and discharged 11/29/22 after presenting with nausea/vomiting, GEORGE, and had fever. Infectious work up performed. Urine cx had +multiple organisms none in predominance. Kidney US with edema/possible pyelo, ID consulted. She was initially treated with broad spectrum antibiotics. She was discharged on PO cipro x 7 days at [facility]. She now presents to the ED with complaints of ongoing nausea/vomiting, decreased appetite, intermittent abdominal pain. She had ureteral stent removed outpatient yesterday 11/30/22. In ED, she is noted to be tachycardic in the 130s, blood pressure 90s, and fever of 100.4. EKG in ED shows sinus tachycardia. Creatinine remains elevated above baseline. She is receiving 1L LR bolus in ED. Plan for repeat infectious work up, IV antibiotics, IVF hydration, kidney US, and CT A/P. Endocrinology consulted for management of hyperglycemia.           Interval HPI:   Overnight events: Admitted to [facility]. BG within goal ranges without insulin needs.  Creatinine 3.3. Receiving steroids per primary; solumedrol 20 mg today.  Diet NPO    Eating:   NPO  Nausea: yes  Hypoglycemia and intervention: No  Fever: No  TPN and/or TF: No  If yes, type of TF/TPN and rate: n/a    PMH, PSH, FH, SH reviewed     ROS:  Constitutional: Negative for weight changes.  Eyes: Negative for visual disturbance.  Respiratory: Negative for cough.   Cardiovascular: Negative for chest pain.  Gastrointestinal: + for nausea.  Endocrine: Negative for polyuria, polydipsia.  Musculoskeletal: Negative for back pain.  Skin: Negative for rash.  Neurological: Negative for syncope.  Psychiatric/Behavioral: Negative for depression.      Review of Systems    Current Medications and/or Treatments Impacting Glycemic Control  Immunotherapy:    Immunosuppressants           Stop Route Frequency     tacrolimus (PROGRAF) capsule (SUBLINGUAL) 2 mg         -- SL 2 times daily          Steroids:   Hormones (From admission, onward)      Start     Stop Route Frequency Ordered    12/02/22 0900  predniSONE tablet 5 mg         -- Oral Daily 12/01/22 1607          Pressors:    Autonomic Drugs (From admission, onward)      None          Hyperglycemia/Diabetes Medications:   Antihyperglycemics (From admission, onward)      Start     Stop Route Frequency Ordered    12/05/22 1146  insulin aspart U-100 pen 0-5 Units         -- SubQ Every 6 hours PRN 12/05/22 1046             PHYSICAL EXAMINATION:  Vitals:    12/05/22 1325   BP: (!) 103/49   Pulse: (!) 120   Resp: 18   Temp: 98.7 °F (37.1 °C)     Body mass index is 23.54 kg/m².    Physical Exam  Constitutional: Well developed, well nourished, NAD.  ENT: External ears no masses with nose patent; normal hearing.  Neck: Supple; trachea midline.  Cardiovascular: Normal heart sounds, no LE edema. DP +2 bilaterally.  Lungs: Normal effort; lungs anterior bilaterally clear to auscultation.  Abdomen: Soft, no masses, no hernias.  MS: No clubbing or cyanosis of nails noted;  unable to  assess gait.  Skin: No rashes, lesions, or ulcers; no nodules. Injection sites are ok. No lipo hypertropthy or atrophy.  Psychiatric: Good judgement and insight; normal mood and affect.  Neurological: Cranial nerves are grossly intact. Normal vibration sense in the bilateral lower extremities.  Foot: Nails in good condition, no amputations noted.        Labs Reviewed and Include   Recent Labs   Lab 12/04/22  1814 12/04/22 2044 12/05/22  0536   *  838*   < > 120*   CALCIUM 6.9*  6.9*   < > 8.8   ALBUMIN 2.0*  2.0*   < > 2.3*   PROT 5.1*  --   --      139   < > 140   K 2.6*  2.6*   < > 3.2*   CO2 40*  40*   < > 21*   CL 89*  89*   < > 104   BUN 17  17   < > 20   CREATININE 3.2*  3.2*   < > 3.3*   ALKPHOS 74  --   --    ALT 15  --   --    AST 15  --   --    BILITOT 0.4  --   --     < > = values in this interval not displayed.     Lab Results   Component Value Date    WBC 19.54 (H) 12/05/2022    HGB 8.9 (L) 12/05/2022    HCT 26.5 (L) 12/05/2022    MCV 92 12/05/2022     12/05/2022     No results for input(s): TSH, FREET4 in the last 168 hours.  Lab Results   Component Value Date    HGBA1C 11.1 (H) 11/02/2022       Nutritional status:   Body mass index is 23.54 kg/m².  Lab Results   Component Value Date    ALBUMIN 2.3 (L) 12/05/2022    ALBUMIN 2.5 (L) 12/04/2022    ALBUMIN 2.0 (L) 12/04/2022    ALBUMIN 2.0 (L) 12/04/2022     No results found for: PREALBUMIN    Estimated Creatinine Clearance: 22.1 mL/min (A) (based on SCr of 3.3 mg/dL (H)).    Accu-Checks  Recent Labs     12/04/22 2012 12/05/22  1231   POCTGLUCOSE 133* 125*        ASSESSMENT and PLAN    * Sepsis  Managed per primary.     Steroid-induced hyperglycemia  -180    BG elevated on labs- likely false reading  BG monitoring ac/hs and low dose correction scale.           Status post pancreas transplantation  Managed per primary.   No insulin needs since surgery per patient     Prophylactic immunotherapy  May increase insulin  resistance.         Status post -donor kidney transplantation  Optimize BG control          Plan discussed with patient at bedside.     Palmira Hooks NP  Endocrinology  [facility] - [facility]

## 2022-12-05 NOTE — NURSING
Palmira CATHERINE notified patient febrile overnight and currently NPO with IVF order ended at 0500.

## 2022-12-05 NOTE — ASSESSMENT & PLAN NOTE
- Blood cx x 2, ngtd  - UA showed 11 WBCs, moderate bacteria, and 8 hyaline casts  - Urine cx with E. Faecium & Diphtheroids  - ON pt with Tmax 103 and HR in 140's  - LA 0.6  - D/w ID, changed cefepime to zosyn, added luis carlos, and stopped dapto  - Repeat urine and blood cxs in process  - CT AP noted interval development of short segment circumferential wall thickening of the fluid-filled cecum and proximal colon with surrounding stranding and edema, perhaps infectious colitis, typhlitis or nonspecific colopathy  - C diff PCR negative  - CMV in process  - GI pathogens panel ordered  - WBCs increasing, 19.5  - Cr 3.3 from 3.5.

## 2022-12-05 NOTE — SUBJECTIVE & OBJECTIVE
Past Medical History:   Diagnosis Date    Acute kidney injury superimposed on chronic kidney disease 4/7/2021    Anemia     Chronic hypertension with exacerbation during pregnancy in second trimester 11/6/2020    Current regimen (11/6/20):  - Carvedilol 12.5 mg BID - Nifedipine 30 mg daily Baseline CKD + proteinuria    Chronic kidney disease     Depression     Diabetes mellitus     Diabetic retinopathy     Diarrhea     Encounter for blood transfusion     End stage renal failure on dialysis     Gastroparesis     Glaucoma     Hepatomegaly 4/29/2021    Hx of psychiatric care     Hyperlipidemia     Hypertension     Nephrotic syndrome     Nephrotic syndrome 3/4/2021    Nonspecific reaction to tuberculin skin test without active tuberculosis 10/1/2021    Palpitations     Poor fetal growth affecting management of mother in second trimester 10/15/2020    Restrictive lung disease     Retinal detachment     Severe pre-eclampsia in second trimester 11/6/2020    Type 1 diabetes mellitus with end-stage renal disease (ESRD) 2/24/2015    Followed by Dr. Mayo.  Last A1C was 13  Currently on lantus 20 units qAM and humolog 10 units with meals  - a fetal echocardiogram around 22-24 weeks - needs eye exam, podiatry exam  - needs EKG, maternal echo and fu with cardiology  - ASA 81mg, folic acid 4mg PO daily - hemoglobin A1c every 4-6 weeks -24 hour urine for protein and creatinine clearance should be performed. Patient will also need a       Past Surgical History:   Procedure Laterality Date    AV FISTULA PLACEMENT Left 04/07/2021    Procedure: CREATION, AV FISTULA;  Surgeon: Roberto Ryan MD;  Location: [facility] OR [address] [place] [address];  Service: Peripheral Vascular;  Laterality: Left;    COLONOSCOPY N/A 03/16/2022    Procedure: COLONOSCOPY;  Surgeon: Tavo Kwok MD;  Location: [facility] (4TH FLR);  Service: Endoscopy;  Laterality: N/A;  Questionable history of delayed gastric emptying longstanding diabetes now on eating pre transplant  workup for history of nausea vomiting which seems to have improved with dialysis also chronic diarrhea and history of anemia pre transplant workup for kidney transplant. 3    CYSTOSCOPY      ESOPHAGOGASTRODUODENOSCOPY N/A 03/16/2022    Procedure: EGD (ESOPHAGOGASTRODUODENOSCOPY);  Surgeon: Tavo Kwok MD;  Location: [facility] (4TH FLR);  Service: Endoscopy;  Laterality: N/A;  Questionable history of delayed gastric emptying longstanding diabetes now on eating pre transplant workup for history of nausea vomiting which seems to have improved with dialysis also chronic diarrhea and history of anemia pre transplant workup for    FISTULOGRAM N/A 08/11/2021    Procedure: Fistulogram;  Surgeon: Roberto Ryan MD;  Location: [facility] CATH LAB;  Service: Cardiology;  Laterality: N/A;    KIDNEY TRANSPLANT Right 11/02/2022    Procedure: TRANSPLANT, KIDNEY;  Surgeon: Kumar Rodriges Jr., MD;  Location: [facility] OR 2ND FLR;  Service: Transplant;  Laterality: Right;    LASER PHOTOCOAGULATION OF RETINA Right 05/31/2022    Procedure: PHOTOCOAGULATION, RETINA, USING LASER;  Surgeon: Maximilian Montaño MD;  Location: [facility] OR 1ST FLR;  Service: Ophthalmology;  Laterality: Right;    PERCUTANEOUS TRANSLUMINAL ANGIOPLASTY OF ARTERIOVENOUS FISTULA N/A 08/11/2021    Procedure: PTA, AV FISTULA;  Surgeon: Roberto Ryan MD;  Location: [facility] CATH LAB;  Service: Cardiology;  Laterality: N/A;    REMOVAL IMPLANT, POSTERIOR SEGMENT, INTRAOCULAR Right 02/01/2022    Procedure: REMOVAL IMPLANT, POSTERIOR SEGMENT, INTRAOCULAR;  Surgeon: Maximilian Montaño MD;  Location: [facility] 1ST FLR;  Service: Ophthalmology;  Laterality: Right;    REPAIR OF RETINAL DETACHMENT WITH VITRECTOMY Right 01/25/2022    Procedure: REPAIR, RETINAL DETACHMENT, WITH VITRECTOMY, MEMBRANE PEEL, LASER, INJECTION OF GAS VS OIL;  Surgeon: Maximilian Montaño MD;  Location: [facility] OR 1ST FLR;  Service: Ophthalmology;  Laterality: Right;    REVISION OF ARTERIOVENOUS  FISTULA Left 12/10/2021    Procedure: REVISION, AV FISTULA with BVT;  Surgeon: Roberto Ryan MD;  Location: [facility] OR 2ND FLR;  Service: Peripheral Vascular;  Laterality: Left;    TRANSPLANTATION OF PANCREAS N/A 11/02/2022    Procedure: TRANSPLANT, PANCREAS;  Surgeon: Kumar Rodriges Jr., MD;  Location: [facility] OR 2ND FLR;  Service: Transplant;  Laterality: N/A;    VITRECTOMY BY PARS PLANA APPROACH Right 02/01/2022    Procedure: VITRECTOMY, PARS PLANA APPROACH;  Surgeon: Maximilian Montaño MD;  Location: [facility] OR 1ST FLR;  Service: Ophthalmology;  Laterality: Right;    VITRECTOMY BY PARS PLANA APPROACH Right 05/31/2022    Procedure: VITRECTOMY, PARS PLANA APPROACH;  Surgeon: Maximilian Montaño MD;  [facility];  Service: Ophthalmology;  Laterality: Right;       Review of patient's allergies indicates:  No Known Allergies    Family History       Problem Relation (Age of Onset)    Diabetes Brother    Heart disease Father    Hypertension Mother            Tobacco Use    Smoking status: Never    Smokeless tobacco: Never   Substance and Sexual Activity    Alcohol use: No    Drug use: No    Sexual activity: Not Currently     Partners: Male     Comment: monogamous       Review of Systems   Constitutional:  Negative for chills and fever.   Genitourinary:  Positive for hematuria. Negative for dysuria and frequency.     Objective:     Temp:  [98.1 °F (36.7 °C)-99.6 °F (37.6 °C)] 99.6 °F (37.6 °C)  Pulse:  [117-138] 129  Resp:  [12-20] 18  SpO2:  [95 %-100 %] 98 %  BP: (109-166)/() 160/86     Body mass index is 23.54 kg/m².    Date 12/04/22 0700 - 12/05/22 0659   Shift 8002-2232 4656-1452 4171-2976 24 Hour Total   INTAKE   I.V.(mL/kg)  1921.9(30.9)  1921.9(30.9)   IV Piggyback  104.7  104.7   Shift Total(mL/kg)  2026.6(32.6)  2026.6(32.6)   OUTPUT   Urine(mL/kg/hr) 1275(2.6) 450  1725   Emesis/NG output 150   150   Shift Total(mL/kg) 1425(22.9) 450(7.2)  1875(30.1)   Weight (kg) 62.2 62.2 62.2 62.2      Bladder Scan Volume (mL): 283 mL (12/04/22 1248)  Post Void Cath Residual Output (mL): 230 mL (12/01/22 2320)    Drains       Drain  Duration                  Hemodialysis AV Fistula Left upper arm -- days         Urethral Catheter 12/04/22 1350 <1 day                    Physical Exam  Constitutional:       General: She is not in acute distress.     Appearance: Normal appearance.   HENT:      Head: Normocephalic.   Eyes:      Pupils: Pupils are equal, round, and reactive to light.   Cardiovascular:      Rate and Rhythm: Normal rate.   Pulmonary:      Effort: Pulmonary effort is normal. No respiratory distress.   Chest:      Chest wall: No tenderness.   Abdominal:      General: Abdomen is flat. There is no distension.      Palpations: Abdomen is soft.      Tenderness: There is abdominal tenderness (suprapubic tenderness). There is no right CVA tenderness or left CVA tenderness.   Genitourinary:     Comments: 22 Fr dc catheter draining clear yellow urine  Musculoskeletal:         General: Normal range of motion.      Cervical back: Normal range of motion.   Neurological:      General: No focal deficit present.      Mental Status: She is alert and oriented to person, place, and time.   Psychiatric:         Mood and Affect: Mood normal.         Behavior: Behavior normal.       Significant Labs:    BMP:  Recent Labs   Lab 12/02/22  0622 12/03/22  0622 12/04/22  0636   * 135* 141   K 3.7 3.6 3.4*    107 109   CO2 19* 18* 20*   BUN 22* 17 21*   CREATININE 3.0* 2.3* 3.3*   CALCIUM 8.7 8.6* 9.4       CBC:  Recent Labs   Lab 12/02/22  0913 12/03/22  0622 12/04/22  0636   WBC 9.27 10.25 15.36*   HGB 7.2* 6.8* 9.8*   HCT 22.5* 21.0* 30.2*    391 452*       All pertinent labs results from the past 24 hours have been reviewed.    Significant Imaging:  All pertinent imaging results/findings from the past 24 hours have been reviewed.

## 2022-12-05 NOTE — NURSING
FLORIN Alexander made aware of mucus appearing in dc tubing. Orders received to irrigate. This RN irrigated dc with 50 cc sterile water. No resistance met, no discomfort reported. Clear yellow return noted to contain white mucus particles. FLORIN Alexander aware of mucus return. No new orders received at this time.

## 2022-12-05 NOTE — SUBJECTIVE & OBJECTIVE
Subjective:   History of Present Illness:  Ms. Read is a 27 y.o.  female with ESRD secondary to diabetic nephropathy and HTN, now s/p SPK 11/3/22 (Thymo induction, CMV D-/R+). Her post-op course was complicated by urinary retention requiring Moncada replacement, now resolved. She was recently admitted to the hospital and discharged 11/29/22 after presenting with nausea/vomiting, GEORGE, and had fever. Infectious work up performed. Urine cx had +multiple organisms none in predominance. Kidney US with edema/possible pyelo, ID consulted. She was initially treated with broad spectrum antibiotics. She was discharged on PO cipro x 7 days at [facility]. She now presents to the ED with complaints of ongoing nausea/vomiting, decreased appetite, intermittent abdominal pain. She had ureteral stent removed outpatient yesterday 11/30/22. She was in infusion center today to give IVF but had episode of vomiting so she was sent to the ED. She denies dysuria/urgency. She denies history of gastroparesis (has had gastric emptying study in 2020 that was normal). In ED, she is noted to be tachycardic in the 130s, blood pressure 90s, along with low grade fever 100.4. EKG shows sinus tachycardia. Creatinine remains elevated above baseline. She is receiving 1L LR bolus in ED. Plan for repeat infectious work up, IVF hydration, IV antibiotics, kidney US, and CT A/P. Will also check CMV PCR.    Ms. Read is a 27 y.o. year old female who is status post Kidney, Pancreas Transplant - 11/3/2022  (#1).    Her maintenance immunosuppression consists of:   Immunosuppressants (From admission, onward)      Start     Stop Route Frequency Ordered    12/05/22 1130  tacrolimus (PROGRAF) capsule (SUBLINGUAL) 2 mg         -- SL 2 times daily 12/05/22 1029            Interval history: ON pt with Tmax 103 and HR in 140's. LA 0.6. UA from 12/1 polymicrobial, positive for Ucx E. Faecium & Diphtheroids. Pt was d/w ID, changed cefepime to zosyn, added  luis carlos, and stopped dapto. Repeat urine and blood cxs in process. Over the weekend CT AP noted interval development of short segment circumferential wall thickening of the fluid-filled cecum and proximal colon with surrounding stranding and edema, perhaps infectious colitis, typhlitis or nonspecific colopathy. C diff PCR negative. GI pathogens panel ordered. Pancreas US ordered to r/o infarct as cause of pain/fever. Pt states she is feeling better this am, was having n/v during the weekend. NPO. Temp 102.6 early this morning but has remained afebrile since. WBCs increasing, 19.5. Cr 3.3 from 3.5. DSA's in process. Tentative plan for kidney biopsy Wednesday. Dc in place for hydro, upsized by urology due CT showing enlarged transplant kidney and distended bladder despite dc in adequate position. Pt also with continued anemia, parvo B19 PCR and Ab in process and iron studies ordered. Will continue to monitor.       Past Medical, Surgical, Family, and Social History:   Unchanged from H&P.    Scheduled Meds:   acetaminophen  650 mg Rectal Once    aspirin  81 mg Oral Daily    atorvastatin  40 mg Oral Daily    DAPTOmycin (CUBICIN) IV  10 mg/kg Intravenous Q24H    ganciclovir (CYTOVENE) IVPB  0.625 mg/kg Intravenous Q24H    heparin (porcine)  5,000 Units Subcutaneous Q8H    micafungin (MYCAMINE) IVPB  100 mg Intravenous Q24H    piperacillin-tazobactam (ZOSYN) IVPB  4.5 g Intravenous Q8H    predniSONE  5 mg Oral Daily    sulfamethoxazole-trimethoprim 400-80mg  1 tablet Oral Every Mon, Wed, Fri    tacrolimus  2 mg Sublingual BID    venlafaxine  37.5 mg Oral Daily     Continuous Infusions:   sodium chloride 0.9% 75 mL/hr at 12/05/22 1035     PRN Meds:sodium chloride, sodium chloride, acetaminophen, dextrose 10%, dextrose 10%, glucagon (human recombinant), insulin aspart U-100, morphine, ondansetron, promethazine (PHENERGAN) IVPB, sodium chloride 0.9%, traMADoL    Intake/Output - Last 3 Shifts         12/03 0700  12/04 0659  "12/04 0700 12/05 0659 12/05 0700 12/06 0659    P.O. 2297      I.V. (mL/kg) 3052.8 (49.1) 2070.4 (33.3) 100 (1.6)    Blood 350      IV Piggyback 346.7 144.7 200    Total Intake(mL/kg) 6046.5 (97.2) 2215 (35.6) 300 (4.8)    Urine (mL/kg/hr) 3500 (2.3) 3675 (2.5)     Emesis/NG output 0 150     Stool 0 0     Total Output 3500 3825     Net +2546.5 -1610 +300           Stool Occurrence 5 x 1 x 1 x    Emesis Occurrence 1 x 1 x              Review of Systems   Constitutional:  Positive for activity change, appetite change (decreased appetite), fatigue and fever. Negative for chills.   HENT: Negative.     Eyes: Negative.    Respiratory:  Negative for shortness of breath.    Cardiovascular:  Negative for chest pain and leg swelling.   Gastrointestinal:  Positive for nausea. Negative for abdominal distention, constipation, diarrhea and vomiting.   Endocrine: Negative.    Genitourinary:  Negative for dysuria, hematuria and urgency.   Musculoskeletal: Negative.    Skin:  Positive for wound.   Allergic/Immunologic: Positive for immunocompromised state.   Neurological:  Positive for weakness. Negative for dizziness and light-headedness.   Hematological: Negative.    Psychiatric/Behavioral:  Negative for agitation and confusion. The patient is not nervous/anxious.     Objective:     Vital Signs (Most Recent):  Temp: 98.7 °F (37.1 °C) (12/05/22 1325)  Pulse: (!) 120 (12/05/22 1325)  Resp: 18 (12/05/22 1325)  BP: (!) 103/49 (12/05/22 1325)  SpO2: (!) 94 % (12/05/22 1325)   Vital Signs (24h Range):  Temp:  [98.4 °F (36.9 °C)-103.1 °F (39.5 °C)] 98.7 °F (37.1 °C)  Pulse:  [104-143] 120  Resp:  [18-20] 18  SpO2:  [94 %-99 %] 94 %  BP: (103-179)/(49-86) 103/49     Weight: 62.2 kg (137 lb 2 oz)  Height: 5' 4" (162.6 cm)  Body mass index is 23.54 kg/m².    Physical Exam  Vitals and nursing note reviewed.   Constitutional:       General: She is not in acute distress.     Appearance: She is ill-appearing.   Cardiovascular:      Rate and " Rhythm: Regular rhythm. Tachycardia present.      Heart sounds: No murmur heard.    No gallop.   Pulmonary:      Effort: Pulmonary effort is normal.      Breath sounds: No wheezing or rales.   Abdominal:      General: There is no distension.      Tenderness: There is abdominal tenderness. There is no guarding or rebound.   Musculoskeletal:         General: No tenderness.      Cervical back: Normal range of motion.      Right lower leg: No edema.      Left lower leg: No edema.   Skin:     General: Skin is warm.   Neurological:      Mental Status: She is alert and oriented to person, place, and time.      Motor: Weakness present.   Psychiatric:         Mood and Affect: Mood normal.         Behavior: Behavior normal.         Thought Content: Thought content normal.         Judgment: Judgment normal.       Laboratory:  CBC:   Recent Labs   Lab 12/04/22  0636 12/04/22  1814 12/05/22  0536   WBC 15.36* 17.30* 19.54*   RBC 3.27* 2.90* 2.88*   HGB 9.8* 8.8* 8.9*   HCT 30.2* 26.0* 26.5*   * 405 394   MCV 92 90 92   MCH 30.0 30.3 30.9   MCHC 32.5 33.8 33.6     BMP:   Recent Labs   Lab 12/04/22  1814 12/04/22  2044 12/05/22  0536   *  838* 172* 120*     139 141 140   K 2.6*  2.6* 3.2* 3.2*   CL 89*  89* 108 104   CO2 40*  40* 21* 21*   BUN 17  17 19 20   CREATININE 3.2*  3.2* 3.5* 3.3*   CALCIUM 6.9*  6.9* 8.7 8.8     Labs within the past 24 hours have been reviewed.    Diagnostic Results:  CT - Abd/Pelvic: No results found. However, due to the size of the patient record, not all encounters were searched. Please check Results Review for a complete set of results.  US - Kidney: Results for orders placed during the hospital encounter of 12/01/22    US Transplant Kidney With Doppler    Narrative  EXAMINATION:  US TRANSPLANT KIDNEY WITH DOPPLER    CLINICAL HISTORY:  s/p SPK. increase in Cr, severe abdominal pain;    TECHNIQUE:  Transplant renal ultrasound of the right lower quadrant with color flow  doppler and duplex analysis.    COMPARISON:  Renal transplant ultrasound 12/03/2022.  12/01/2022.    FINDINGS:  Status post right lower quadrant renal transplant on 11/03/2022.  The renal transplant measures 12.5 cm and demonstrates no focal parenchymal abnormality. Minimal transplant hydronephrosis.  No peritransplant fluid.    Renal arterial resistive indices are as follows: Interlobar 0.77 (previously 0.81), segmental Up 0.75 (previously 0.72), segmental Mid 0.77 (previously 0.78), segmental Low 0.76 (previously 0.80).  There is no evidence of a tardus parvus waveform. The maximum velocity in the main renal artery is 236 (previously 187) cm/sec.  Maximum velocity in the main renal vein is 139 (previously 115) centimeters/second.  RA/iliac ratio 0.97    The renal transplant venous system is unremarkable.    Bladder is decompressed around Moncada catheter.    Impression  Minimal transplant hydronephrosis.    Mild interval increased main renal artery and main renal vein peak systolic velocity compared to prior.  Recommend attention on follow-up.    Intraparenchymal resistive indices are improved compared to multiple prior exams.    Electronically signed by resident: Laura Sifuentes  Date:    12/04/2022  Time:    15:10    Electronically signed by: Zulma Anaya MD  Date:    12/04/2022  Time:    16:05    US - Pancreas: Results for orders placed during the hospital encounter of 12/01/22    US Pancreas Transplant Post    Narrative  EXAMINATION:  US DOPPLER PANCREAS TRANSPLANT POST (XPD)    CLINICAL HISTORY:  s/p SPK; r/o pancreas infarct;    TECHNIQUE:  Grayscale, color flow, and spectral analysis was used to evaluate the pancreas allograft using transabdominal ultrasound technique.    COMPARISON:  Ultrasound from 11/25/2022    FINDINGS:  Pancreatic allograft demonstrates heterogeneous area near the pancreatic head neck, not significantly changed compared to prior examination.  There are no peripancreatic fluid  collections.    Velocities:    Conduit:216 cm/s    Splenic artery:96 cm/s    Splenic vein:14 cm/s    Portal vein:109 cm/s    Superior mesenteric vein:40 cm/s    Iliac artery:204 cm/s    SMA of Y graft: 38 cm/s    Resistive indices range from 0.62-0.78.    Impression  Satisfactory Doppler evaluation of the pancreatic allograft.    Redemonstration of heterogeneous, hypoechoic area of the pancreatic head/neck with adjacent bowel.  Findings are nonspecific and continued follow-up recommended.      Electronically signed by: Jim Cedeño MD  Date:    12/05/2022  Time:    12:30

## 2022-12-05 NOTE — ASSESSMENT & PLAN NOTE
-180    BG elevated on labs- likely false reading  BG monitoring ac/hs and low dose correction scale.

## 2022-12-05 NOTE — ASSESSMENT & PLAN NOTE
- Amylase/lipase WNL   - Pancreas US 11/25 satisfactory  - Repeat Pancreas US ordered today 12/5 to r/o infarct as source of pain/fever   - Monitor with daily labs

## 2022-12-05 NOTE — ASSESSMENT & PLAN NOTE
- Continue prograf and steroids. Monitor prograf level daily, monitor for toxic side effects, and adjust for therapeutic dose   - Hold cellcept for GI upset.

## 2022-12-05 NOTE — SUBJECTIVE & OBJECTIVE
Interval HPI:   Overnight events: Admitted to [facility]. BG within goal ranges without insulin needs. Creatinine 3.3. Receiving steroids per primary; solumedrol 20 mg today.  Diet NPO    Eating:   NPO  Nausea: yes  Hypoglycemia and intervention: No  Fever: No  TPN and/or TF: No  If yes, type of TF/TPN and rate: n/a    PMH, PSH, FH, SH reviewed     ROS:  Constitutional: Negative for weight changes.  Eyes: Negative for visual disturbance.  Respiratory: Negative for cough.   Cardiovascular: Negative for chest pain.  Gastrointestinal: + for nausea.  Endocrine: Negative for polyuria, polydipsia.  Musculoskeletal: Negative for back pain.  Skin: Negative for rash.  Neurological: Negative for syncope.  Psychiatric/Behavioral: Negative for depression.      Review of Systems    Current Medications and/or Treatments Impacting Glycemic Control  Immunotherapy:    Immunosuppressants           Stop Route Frequency     tacrolimus (PROGRAF) capsule (SUBLINGUAL) 2 mg         -- SL 2 times daily          Steroids:   Hormones (From admission, onward)      Start     Stop Route Frequency Ordered    12/02/22 0900  predniSONE tablet 5 mg         -- Oral Daily 12/01/22 1607          Pressors:    Autonomic Drugs (From admission, onward)      None          Hyperglycemia/Diabetes Medications:   Antihyperglycemics (From admission, onward)      Start     Stop Route Frequency Ordered    12/05/22 1146  insulin aspart U-100 pen 0-5 Units         -- SubQ Every 6 hours PRN 12/05/22 1046             PHYSICAL EXAMINATION:  Vitals:    12/05/22 1325   BP: (!) 103/49   Pulse: (!) 120   Resp: 18   Temp: 98.7 °F (37.1 °C)     Body mass index is 23.54 kg/m².    Physical Exam  Constitutional: Well developed, well nourished, NAD.  ENT: External ears no masses with nose patent; normal hearing.  Neck: Supple; trachea midline.  Cardiovascular: Normal heart sounds, no LE edema. DP +2 bilaterally.  Lungs: Normal effort; lungs anterior bilaterally clear to  auscultation.  Abdomen: Soft, no masses, no hernias.  MS: No clubbing or cyanosis of nails noted;  unable to assess gait.  Skin: No rashes, lesions, or ulcers; no nodules. Injection sites are ok. No lipo hypertropthy or atrophy.  Psychiatric: Good judgement and insight; normal mood and affect.  Neurological: Cranial nerves are grossly intact. Normal vibration sense in the bilateral lower extremities.  Foot: Nails in good condition, no amputations noted.

## 2022-12-05 NOTE — PROGRESS NOTES
[facility] Progress Note    Assessment/Plan:     Isabela Read is a 27 y.o.  female with ESRD secondary to diabetic nephropathy and HTN, now s/p SPK 11/3/22 (Thymo induction, CMV D-/R+). Her post-op course was complicated by urinary retention requiring Moncada replacement, now resolved. She was recently admitted to the hospital and discharged 11/29/22 after presenting with nausea/vomiting, GEORGE, and had fever. Infectious work up performed. Urine cx had +multiple organisms none in predominance. Kidney US with edema/possible pyelo, ID consulted. She was initially treated with broad spectrum antibiotics. She was discharged on PO cipro x 7 days at [place]. 12/1 presentation of complaints of ongoing nausea/vomiting, decreased appetite, intermittent abdominal pain. She had ureteral stent removed outpatient 11/30/22. 12/1 CT AP noted for air w/in allograft, perhaps related to interval removal of stent as per read.    12/4 CT AP noted interval development of short segment circumferential wall thickening of the fluid-filled cecum and proximal colon with surrounding stranding and edema, perhaps infectious colitis, typhlitis or nonspecific colopathy. New mild diffuse anasarca. Extensive atherosclerotic disease of the mesenteric and proximal lower extremity vasculature.     12/1 CMV pcr negative  12/1 bcx ngtd  12/3 RIP negative  12/4 bcx ngtd  12/4 C. Diff pcr negative  12/5 LA 0.6    Fevers in KTX pt, Typhlitis  -notified the micro lab to w/u 12/1 polymicrobial UA, Ucx E. Faecium & Diphtheroids  -12/4 Ucx sent (follow-up)  -12/4 Parvovirus B19 PCR and Ab (pending)  -obtain GI pathogens panel    -continue zosyn, start micafungin, and ok to stop daptomycin  -supportive care    Victor Hugo Carbajal MD  [facility]  Cell: [phone]    Thank you for allowing us to participate in the care of this patient. We will continue to follow along. Case has been discussed with consult staff, who is in  agreement with assessment and plan. ID will continue to follow.     Subjective:      HPI:  Isabela Read is a 27 y.o.  female with ESRD secondary to diabetic nephropathy and HTN, now s/p SPK 11/3/22 (Thymo induction, CMV D-/R+). Her post-op course was complicated by urinary retention requiring Moncada replacement, now resolved. She was recently admitted to the hospital and discharged 22 after presenting with nausea/vomiting, GEORGE, and had fever. Infectious work up performed. Urine cx had +multiple organisms none in predominance. Kidney US with edema/possible pyelo, ID consulted. She was initially treated with broad spectrum antibiotics. She was discharged on PO cipro x 7 days at [place].    presentation of complaints of ongoing nausea/vomiting, decreased appetite, intermittent abdominal pain. She had ureteral stent removed outpatient 22.  UA hazy, nitrate neg, LE trace, moderate bacteria.    Interval events:  When seen today pt able to tolerate some small PO intake, denies nv, RLQ pain, ha.  Tmax 103.1, spiking temps. Satting RA, normotensive. WBC spike noted (10.25, 17.30, 19.54). GEORGE noted.     Objective:   Last 24 Hour Vital Signs:  BP  Min: 124/82  Max: 179/82  Temp  Av.3 °F (37.9 °C)  Min: 98.4 °F (36.9 °C)  Max: 103.1 °F (39.5 °C)  Pulse  Av.9  Min: 104  Max: 143  Resp  Av.7  Min: 18  Max: 20  SpO2  Av.7 %  Min: 98 %  Max: 100 %  I/O last 3 completed shifts:  In: 2595 [P.O.:380; I.V.:2070.4; IV Piggyback:144.7]  Out: 4725 [Urine:4575; Emesis/NG output:150]    Physical Exam  Vitals and nursing note reviewed.   Constitutional:       General: She is not in acute distress.  HENT:      Head: Normocephalic.      Nose: Nose normal.   Cardiovascular:      Rate and Rhythm: Regular rhythm.      Heart sounds: No murmur heard.    No gallop.   Pulmonary:      Effort: Pulmonary effort is normal.      Breath sounds: No wheezing or rales.   Abdominal:      General:  There is no distension.      Tenderness: There is no abdominal tenderness. There is no guarding or rebound.      Comments: surgical incision cdi  Musculoskeletal:         General: Normal range of motion.      Cervical back: Normal range of motion.      Right lower leg: No edema.      Left lower leg: No edema.   Skin:     General: Skin is warm and dry.   Neurological:      Mental Status: She is alert and oriented to person, place, and time.   Psychiatric:         Mood and Affect: Mood normal.         Behavior: Behavior normal.         Thought Content: Thought content normal.         Judgment: Judgment normal.     Laboratory:  Laboratory Data Reviewed: yes  Pertinent Findings:  Recent Labs   Lab 12/01/22  0909 12/01/22  1630 12/02/22  0622 12/04/22  0636 12/04/22  1814 12/04/22  2044 12/05/22  0536   WBC 11.09 14.22*   < > 15.36* 17.30*  --  19.54*   HGB 9.5* 7.4*   < > 9.8* 8.8*  --  8.9*   HCT 29.2* 22.8*   < > 30.2* 26.0*  --  26.5*   * 414   < > 452* 405  --  394   MCV 95 97   < > 92 90  --  92   RDW 14.8* 15.2*   < > 16.0* 15.7*  --  15.8*   * 131*   < > 141 139  139 141 140   K 4.2 4.6   < > 3.4* 2.6*  2.6* 3.2* 3.2*    105   < > 109 89*  89* 108 104   CO2 22* 17*   < > 20* 40*  40* 21* 21*   BUN 19 20   < > 21* 17  17 19 20   CREATININE 2.8* 3.0*   < > 3.3* 3.2*  3.2* 3.5* 3.3*    118*   < > 91 838*  838* 172* 120*   PROT 7.9 6.7  --   --  5.1*  --   --    ALBUMIN 3.2*  3.2* 2.7*   < > 2.7* 2.0*  2.0* 2.5* 2.3*   BILITOT 0.3 0.3  --   --  0.4  --   --    AST 10 17  --   --  15  --   --    ALKPHOS 97 77  --   --  74  --   --    ALT 10 10  --   --  15  --   --     < > = values in this interval not displayed.         Microbiology Data:  Microbiology Results (last 7 days)       Procedure Component Value Units Date/Time    Urine culture [[id number]]  (Abnormal)  (Susceptibility) Collected: 12/01/22 1643    Order Status: Completed Specimen: Urine Updated: 12/05/22 1041     Urine  Culture, Routine Multiple organisms isolated. None in predominance.  Repeat if      clinically necessary.      ENTEROCOCCUS FAECIUM  10,000 - 49,999 cfu/ml        DIPHTHEROIDS  < 10,000 cfu/ml      Narrative:      Specimen Source->Urine    Urine Culture High Risk [[id number]]     Order Status: No result Specimen: Urine     Blood culture [[id number]] Collected: 12/04/22 2343    Order Status: Completed Specimen: Blood Updated: 12/05/22 0715     Blood Culture, Routine No Growth to date    Blood culture [[id number]] Collected: 12/04/22 2343    Order Status: Completed Specimen: Blood Updated: 12/05/22 0715     Blood Culture, Routine No Growth to date    Blood culture [[id number]] Collected: 12/04/22 2044    Order Status: Completed Specimen: Blood Updated: 12/05/22 0345     Blood Culture, Routine No Growth to date    C Diff Toxin by PCR [[id number]] Collected: 12/04/22 1251    Order Status: Completed Updated: 12/05/22 0307     C. diff PCR Negative    Clostridium difficile EIA [[id number]]  (Abnormal) Collected: 12/04/22 1251    Order Status: Completed Specimen: Stool Updated: 12/05/22 0217     C. diff Antigen Positive     C difficile Toxins A+B, EIA Negative     Comment: Testing not recommended for children <24 months old.       Blood culture [[id number]] Collected: 12/04/22 1814    Order Status: Completed Specimen: Blood Updated: 12/05/22 0145     Blood Culture, Routine No Growth to date    Urine Culture High Risk [[id number]] Collected: 12/04/22 2049    Order Status: Sent Specimen: Urine Updated: 12/04/22 2250    Blood culture - site #2 [[id number]] Collected: 12/01/22 1652    Order Status: Completed Specimen: Blood from Peripheral, Hand, Right Updated: 12/04/22 1812     Blood Culture, Routine No Growth to date      No Growth to date      No Growth to date      No Growth to date    Blood culture - site #1 [[id number]] Collected: 12/01/22 1632    Order Status: Completed Specimen: Blood from Peripheral, Wrist, Right Updated:  12/04/22 1812     Blood Culture, Routine No Growth to date      No Growth to date      No Growth to date      No Growth to date    Respiratory Infection Panel (PCR), Nasopharyngeal [[id number]] Collected: 12/03/22 1644    Order Status: Completed Specimen: Nasopharyngeal Swab Updated: 12/03/22 1841     Respiratory Infection Panel Source NP Swab     Adenovirus Not Detected     Coronavirus 229E, Common Cold Virus Not Detected     Coronavirus HKU1, Common Cold Virus Not Detected     Coronavirus NL63, Common Cold Virus Not Detected     Coronavirus OC43, Common Cold Virus Not Detected     Comment: The Coronavirus strains detected in this test cause the common cold.  These strains are not the COVID-19 (novel Coronavirus)strain   associated with the respiratory disease outbreak.          SARS-CoV2 (COVID-19) Qualitative PCR Not Detected     Human Metapneumovirus Not Detected     Human Rhinovirus/Enterovirus Not Detected     Influenza A (subtypes H1, H1-2009,H3) Not Detected     Influenza B Not Detected     Parainfluenza Virus 1 Not Detected     Parainfluenza Virus 2 Not Detected     Parainfluenza Virus 3 Not Detected     Parainfluenza Virus 4 Not Detected     Respiratory Syncytial Virus Not Detected     Bordetella Parapertussis (XS7931) Not Detected     Bordetella pertussis (ptxP) Not Detected     Chlamydia pneumoniae Not Detected     Mycoplasma pneumoniae Not Detected    Narrative:      For all other respiratory sources, order [id number] -  Respiratory Viral Panel by PCR    Urine culture [[id number]] Collected: 12/01/22 2320    Order Status: Canceled Specimen: Urine               Antimicrobials:  Antibiotics (From admission, onward)      Start     Stop Route Frequency Ordered    12/05/22 0900  sulfamethoxazole-trimethoprim 400-80mg per tablet 1 tablet         -- Oral Every Mon, Wed, Fri 12/02/22 0938    12/04/22 2200  piperacillin-tazobactam 4.5 g in sodium chloride 0.9% 100 mL IVPB (ready to mix system)         -- IV Every 8  hours (non-standard times) 12/04/22 2058 12/03/22 1315  DAPTOmycin (CUBICIN) 625 mg in sodium chloride 0.9% 50 mL IVPB         -- IV Every 24 hours (non-standard times) 12/03/22 1209          Antifungals (From admission, onward)      Start     Stop Route Frequency Ordered    12/04/22 2215  micafungin 100 mg in sodium chloride 0.9 % 100 mL IVPB (ready to mix system)         -- IV Every 24 hours (non-standard times) 12/04/22 2100          Antivirals (From admission, onward)          Stop Route Frequency     ganciclovir (CYTOVENE) injection         -- IV Every 24 hours (non-standard times)              Other Results:  Radiology Results:  US Transplant Kidney With Doppler    Result Date: 12/4/2022  EXAMINATION: US TRANSPLANT KIDNEY WITH DOPPLER CLINICAL HISTORY: s/p SPK. increase in Cr, severe abdominal pain; TECHNIQUE: Transplant renal ultrasound of the right lower quadrant with color flow doppler and duplex analysis. COMPARISON: Renal transplant ultrasound 12/03/2022.  12/01/2022. FINDINGS: Status post right lower quadrant renal transplant on 11/03/2022.  The renal transplant measures 12.5 cm and demonstrates no focal parenchymal abnormality. Minimal transplant hydronephrosis.  No peritransplant fluid. Renal arterial resistive indices are as follows: Interlobar 0.77 (previously 0.81), segmental Up 0.75 (previously 0.72), segmental Mid 0.77 (previously 0.78), segmental Low 0.76 (previously 0.80).  There is no evidence of a tardus parvus waveform. The maximum velocity in the main renal artery is 236 (previously 187) cm/sec.  Maximum velocity in the main renal vein is 139 (previously 115) centimeters/second.  RA/iliac ratio 0.97 The renal transplant venous system is unremarkable. Bladder is decompressed around Moncada catheter.     Minimal transplant hydronephrosis. Mild interval increased main renal artery and main renal vein peak systolic velocity compared to prior.  Recommend attention on follow-up. Intraparenchymal  resistive indices are improved compared to multiple prior exams. Electronically signed by resident: Laura Sifuentes Date:    12/04/2022 Time:    15:10 Electronically signed by: Zulma Anaya MD Date:    12/04/2022 Time:    16:05    US Transplant Kidney With Doppler    Result Date: 12/3/2022  EXAMINATION: US TRANSPLANT KIDNEY WITH DOPPLER CLINICAL HISTORY: s/p spk; f/u mild-moderate hydro; TECHNIQUE: Transplant renal ultrasound of the right lower quadrant with color flow doppler and duplex analysis. COMPARISON: Transplant renal ultrasound 12/01/2022.  CT abdomen pelvis 12/01/2022. FINDINGS: Status post right lower quadrant renal transplant on 11/03/2022.  The renal transplant measures 12.2 cm and demonstrates no focal parenchymal abnormality. Mild transplant hydronephrosis, decreased compared to prior exam..  No peritransplant fluid. Renal arterial resistive indices are as follows: Interlobar 0.81 (previously 0.80), segmental Sup 0.72 (previously 0.83), segmental Mid previously 0.78 (0.77), segmental Low previously 0.80 (0.80).  There is no evidence of a tardus parvus waveform. The maximum velocity in the main renal artery is 98.1 (previously 166) cm/sec. Renal veins are patent.  There is a slightly elevated velocity at the renal vein anastomosis measuring 115 centimeters/second (previously 190 centimeters/second). The renal transplant venous system is unremarkable.     1. Interval decrease in transplant hydronephrosis.  Interval improvement of main renal artery velocity. 2. Intraparenchymal resistive indices are slightly elevated but stable compared to prior exam. 3. Improvement in renal vein anastomosis velocity which remains somewhat elevated.  Continued follow-up is recommended. Electronically signed by resident: Laura Sifuentes Date:    12/03/2022 Time:    09:34 Electronically signed by: Zulma Anaya MD Date:    12/03/2022 Time:    10:36    US Transplant Kidney With Doppler    Result Date:  12/1/2022  EXAMINATION: US TRANSPLANT KIDNEY WITH DOPPLER CLINICAL HISTORY: GEORGE in kidney transplant; TECHNIQUE: Real time gray scale and doppler ultrasound was performed over the patient's renal allograft. COMPARISON: Same day CT abdomen pelvis Ultrasound transplant kidney 11/25/2022 FINDINGS: Renal transplant 11/02/2022. Renal allograft in the right lower quadrant.  The allograft measures 12.2 cm. Normal perfusion. Mild to moderate hydronephrosis, new from prior.  Air noted within the collecting system and bladder similar to CT.  There is no ureteral stent. Minimal Peritransplant free fluid. Vasculature: Resistive indices: Interlobar 0.80 (previously 0.84) Segmental upper pole 0.83 (previously 0.90) Segmental mid pole 0.77 (previously 0.88) Segmental lower pole 0.80 (previously 0.80) Main renal artery peak systolic velocity: 166 cm/sec (previously 156 cm/sec) with normal waveform. Renal artery/iliac ratio: 0.86. The main renal vein is patent.  Increased velocity at the anastomosis measuring up to 190 cm/sec.     1. Interval removal of ureteral stent, with mild to moderate hydronephrosis new from prior.  Air noted within the collecting system and bladder similar to same day CT and presumably related to ureteral stent removal. 2. Mild improvement in the renal arterial resistive indices; although, they remain slightly elevated, a nonspecific finding which can be seen with drug toxicity or rejection. 3. Increased velocity at the main renal vein anastomosis.  Attention on follow-up. Electronically signed by resident: Rodríguez Tam Date:    12/01/2022 Time:    20:26 Electronically signed by: Adrian Knox MD Date:    12/01/2022 Time:    20:38    US Transplant Kidney With Doppler    Result Date: 11/25/2022  EXAMINATION: US TRANSPLANT KIDNEY WITH DOPPLER CLINICAL HISTORY: severe GEORGE s/p kidney pancreas transplant. also with fevers; TECHNIQUE: Real time gray scale and doppler ultrasound was performed over the patient's renal  allograft. COMPARISON: U/S transplant kidney with Doppler 11/05/2022 FINDINGS: Patient is status post renal allograft in the right lower quadrant on 11/02/2022.  The allograft measures 11.7 cm, previously 10.6 cm.  There is new diffuse parenchymal slightly more hypoechoic appearance which may reflect changes of edema.  Normal perfusion. No hydronephrosis.  Ureteral stent is visualized. No fluid collections. Vasculature: Resistive indices of the arteries: Interlobar: 0.84, previously 0.69 Upper segment: 0.90, previously 0.74 Mid segment: 0.88, previously 0.75 Lower segment: 0.80, previously 0.70 Main renal artery peak systolic velocity: 107cm/sec with normal waveform, previously 287cm/sec. Renal artery/iliac ratio: 0.48. The main renal vein is patent. Bladder contains some echogenic debris.     Renal allograft is slightly larger in size with new hypoechogenic appearance and interval increase in segmental renal arterial resistive indices.  Differential considerations include allograft edema, drug toxicity, and rejection. Debris within the bladder.  Recommend correlation with urinalysis. This report was flagged in Epic as abnormal. Electronically signed by resident: Hemant Mendoza Date:    11/25/2022 Time:    17:41 Electronically signed by: Adrian Knox MD Date:    11/25/2022 Time:    18:23    US Transplant Kidney With Doppler    Result Date: 11/5/2022  EXAMINATION: US TRANSPLANT KIDNEY WITH DOPPLER CLINICAL HISTORY: cessation of urine post kidney/pancreas transplant; TECHNIQUE: Real time gray scale and doppler ultrasound was performed over the patient's renal allograft. COMPARISON: Kidney transplant Doppler ultrasound 11/04/2022. FINDINGS: Renal allograft in the right lower quadrant.  The allograft measures 10.8 cm, previously 10.6 cm.  Normal perfusion. No hydronephrosis.  There is a ureteral stent. Similar size of peritransplant fluid collection measuring 1.4 x 4.1 x 2.5 cm (previously 1.8 x 3.0 x 3.2 cm). Vasculature:  Resistive indices: Intralobar: 0.69, previously 0.76. Upper segmental: 0.74, previously 0.74. Mid segmental: 0.75, previously 0.74. Lower segmental: 0.70, previously 0.69. Main renal artery peak systolic velocity: 287cm/sec with normal waveform, previously 384 centimeter/second.. Renal artery/iliac ratio: 2.1. The main renal vein is patent. Bladder is decompressed around a Moncada catheter and poorly visualized.     1. Interval improvement of elevated peak systolic velocity of the main renal artery. 2. Mild decrease in the resistive index in the intralobar renal artery.  Remainder of resistive indices are similar to ultrasound from 11/04/2022. 3. Similar size of peritransplant fluid collection. Electronically signed by resident: Palmira Lewis Date:    11/05/2022 Time:    18:11 Electronically signed by: Adrian Knox MD Date:    11/05/2022 Time:    18:46    X-Ray Chest AP Portable    Result Date: 11/25/2022  EXAMINATION: XR CHEST AP PORTABLE CLINICAL HISTORY: Sepsis; TECHNIQUE: Single frontal view of the chest was performed. COMPARISON: Chest radiograph 11/03/2022 and CT thorax 09/14/2022 FINDINGS: Patient is slightly rotated.  No detrimental change.The lungs are well expanded and clear, with normal appearance of pulmonary vasculature and no pleural effusion or pneumothorax. The cardiac silhouette is normal in size. The hilar and mediastinal contours are unremarkable. Bones are intact.     No detrimental change or radiographic acute intrathoracic process seen.  Specifically, no consolidation. Electronically signed by: Adrian Knox MD Date:    11/25/2022 Time:    12:10    CT Abdomen Pelvis  Without Contrast    Result Date: 12/4/2022  EXAMINATION: CT ABDOMEN PELVIS WITHOUT CONTRAST CLINICAL HISTORY: Abdominal pain, acute, nonlocalized; TECHNIQUE: Low dose axial images, sagittal and coronal reformations were obtained from the lung bases to the pubic symphysis without intravenous contrast.   Oral contrast was not  administered. COMPARISON: Ultrasound transplant kidney 12/04/2022, CT abdomen pelvis 12/01/2022 FINDINGS: LUNG BASES & MEDIASTINUM (limited):  Base of heart is normal in size.  No visualized mediastinal lymphadenopathy.  No pericardial effusion.  No pleural effusion.  No focal consolidation. HEPATOBILIARY: No focal hepatic lesions.No biliary ductal dilatation.Normal gallbladder. SPLEEN:  No splenomegaly. PANCREAS: Postsurgical change of pancreatic transplant.  Native pancreas is atrophic.  No peripancreatic fat stranding. ADRENALS:  No adrenal nodules. KIDNEYS/URETERS: Postsurgical change of renal transplant with allograft in the midline to left lower quadrant, stable in configuration.  Native kidneys are atrophic.  Bilateral high-density foci in the native kidneys favored to represent vascular calcifications, with nephroliths felt less likely.  No native hydronephrosis.  Minimal transplant hydronephrosis better characterized on ultrasound 12/04/2022 and slightly increased since comparison CT 12/01/2022, allowing for lack of IV contrast.  No identifiable obstructive etiology.  Interval decrease in transplant collecting system air.  No solid mass lesions definitively seen.  Visualized ureters are unremarkable. PELVIC ORGANS/BLADDER:  Bladder is moderately distended with Moncada catheter in place.  Stable to slightly decreased multiple foci of intravesicular air surrounding the Moncada catheter bulb and along the anterior wall.  Uterus is unremarkable.  Similar to prior, there are bilateral adnexal hypodense structures measuring up to 2.8 cm with attenuation characteristics consistent with complex/hemorrhagic cysts. PERITONEUM / RETROPERITONEUM:  No free air.  Stable to slight interval increase in mesenteric edema.  New trace free fluid and stranding along the right pericolic gutter.  Interval decreased amount of perihepatic free fluid. LYMPH NODES: Multiple prominent mesenteric and para-aortic lymph nodes with none  reaching pathologic short-axis size criteria. VESSELS:  No aneurysm.  Unchanged extensive calcifications of the mesenteric vessels, bilateral common femoral vessels and major visualized branches. GI TRACT: Small hiatal hernia.  Stomach is otherwise unremarkable.  Duodenum is fluid-filled and grossly unremarkable.  Proximal small bowel is normal in caliber without evidence of obstruction.  Multiple air and fluid-filled loops of distal small bowel without wall thickening or pathologic distention.  Interval development of short segment circumferential wall thickening of the fluid-filled cecum and proximal colon (e.g., axial series 2, image 74) with surrounding stranding and edema.  No evidence of obstruction.  Remainder large bowel is largely decompressed and unremarkable.  Appendix is not definitively visualized.  No pneumatosis or portal venous gas. BONES AND SOFT TISSUES: Stable postsurgical appearance of midline laparotomy with induration.  New mild diffuse anasarca.  Remainder of soft tissues are unremarkable.  No acute fractures.  No aggressive osseous lesions.     1. Stable postsurgical change of kidney pancreas transplant. 2. Interval development of short segment circumferential wall thickening of the fluid-filled cecum and proximal colon with surrounding stranding and edema.  No evidence of obstruction.  Findings are nonspecific and may represent infectious colitis, typhlitis or nonspecific colopathy.  Clinical correlation is recommended. 3. Interval development of minimal transplant hydronephrosis better characterized on ultrasound 12/04/2022 and slightly increased since comparison CT 12/01/2022.  No identifiable obstructive etiology.  Interval decrease in transplant collecting system air. 4. New mild diffuse anasarca.  Recommend correlation for volume status and 3rd spacing etiologies. 5. Stable to slightly decreased multiple foci of intravesicular air. 6. Small hiatal hernia. 7. Extensive atherosclerotic  disease of the mesenteric and proximal lower extremity vasculature. 8. Additional findings as above. Electronically signed by resident: Kristopher Limon Date:    12/04/2022 Time:    18:05 Electronically signed by: Adrian Knox MD Date:    12/04/2022 Time:    19:32    CT Abdomen Pelvis  Without Contrast    Result Date: 12/1/2022  EXAMINATION: CT ABDOMEN PELVIS WITHOUT CONTRAST CLINICAL HISTORY: s/p kidney/pancreas transplant with nausea/vomiting; TECHNIQUE: Axial images of the abdomen and pelvis were acquired without adminstration of contrast.    Coronal and sagittal reconstructions were also obtained COMPARISON: Ultrasound 01/25/2022.  CTA 09/14/2022 FINDINGS: Evaluation of intra-abdominal organs is limited due to lack contrast. HEART: Normal in size. No pericardial effusion. LUNG BASES: Visualized portions of the lungs are clear. LIVER: Normal in size and contour.  No focal hepatic lesion. GALLBLADDER: No calcified gallstones. BILE DUCTS: No evidence of dilated ducts. PANCREAS: Postoperative changes of pancreatic transplant.  Native pancreas is atrophic.  No peripancreatic fat stranding or. SPLEEN: Unremarkable. ADRENALS: Unremarkable. KIDNEYS/URETERS: Native kidneys atrophic.  Bilateral calcifications favored to represent vascular calcification or less likely stones.  No hydronephrosis. Postoperative changes of renal transplant.  There is collecting system air, could be related to interval removal of stent.  No evidence hydronephrosis nephrolithiasis. BLADDER: No evidence of wall thickening.  Intraluminal air, likely related to recent stent removal. REPRODUCTIVE: Bilateral adnexal hypodense structures with average 30-35 Hounsfield units measuring up to 2.4 cm, likely small complex/hemorrhagic cysts. STOMACH/DUODENUM: Small sized hiatal hernia. BOWEL/MESENTERY: Small bowel is normal in caliber with no evidence of obstruction. No evidence of inflammation or wall thickening.  Colon demonstrates no focal wall  thickening.  Appendix is unremarkable. Mild diffuse mesenteric edema. PERITONEUM: No intraperitoneal free air or significant fluid LYMPH NODES: No retroperitoneal lymphadenopathy. VESSELS: No aneurysm. Extensive calcific atherosclerosis of the mesenteric vessels and common femoral and its branches. ABDOMINAL WALL:  Midline incision.  There is soft tissue thickening along the incision. BONES: No acute fracture. No suspicious osseous lesions.     Evaluation of intra-abdominal structures is limited due to lack of contrast. 1. Postoperative changes of kidney and pancreatic transplant. 2. There is air within the renal allograft and bladder, likely related to interval removal of ureteric stent. 3. Small-sized hiatal hernia. 4. Extensive atherosclerotic disease of the mesenteric vessel and proximal lower extremities vessels. 5. Additional findings as above. Electronically signed by resident: Mars Ball Date:    12/01/2022 Time:    19:03 Electronically signed by: Adrian Knox MD Date:    12/01/2022 Time:    20:05    US Pancreas Transplant Post    Result Date: 11/25/2022  EXAMINATION: US DOPPLER PANCREAS TRANSPLANT POST (XPD) CLINICAL HISTORY: fevers s/p pancreas ultrasound; TECHNIQUE: Grayscale, color flow, and spectral analysis was used to evaluate the pancreas allograft using transabdominal ultrasound technique. COMPARISON: U/S doppler pancreas transplant 11/07/2022 FINDINGS: Pancreatic allograft is demonstrates a new heterogeneous hypoechoic area.  This most likely represents shadowing from overlying bowel.  Otherwise, pancreatic parenchyma appears similar to prior ultrasound.  Pancreatic duct measures 4 mm, previously 5 mm.  There are no peripancreatic fluid collections. Velocities:in cm/s Conduit:198, previously 245 Splenic artery:91, previously 36 Splenic vein:14, previously 25 Portal vein:73, previously 22 Superior mesenteric vein:45, previously 32 Iliac artery:177, previously 252 Superior mesenteric artery:  65, previously 32 Resistive indices: Head: 0.71 (previously 0.67) Body: 0.70 (previously 0.56) Tail: 0.65 (previously 0.56) While the resistive indices of the transplant pancreatic head, body and tail have overall increased from prior, current values are still within normal limits.     Satisfactory Doppler evaluation of pancreatic allograft. New area of hypoechogenicity, likely representing shadowing from bowel.  Attention on follow-up. Electronically signed by resident: Hemant Mendoza Date:    11/25/2022 Time:    17:27 Electronically signed by: Adrian Knox MD Date:    11/25/2022 Time:    17:54    US Pancreas Transplant Post    Result Date: 11/7/2022  EXAMINATION: US DOPPLER PANCREAS TRANSPLANT POST (XPD) CLINICAL HISTORY: s/p kidney/panc txp. panc enzymes mildly elevated; TECHNIQUE: Grayscale, color flow, and spectral analysis was used to evaluate the pancreas allograft using transabdominal ultrasound technique. COMPARISON: Pancreas transplant ultrasound: 11/03/2022. FINDINGS: Pancreatic transplant on 11/03/2022. Pancreatic allograft is homogeneous in echogenicity.  There are no peripancreatic fluid collections. VELOCITIES: Conduit:245 (previously 310) cm/s Splenic artery:36 (previously 42) cm/s SMA of Y graft: 32 (previously 36) cm/s. Splenic vein:25 (previously 31) cm/s Portal vein:22 (previously 48) cm/s Superior mesenteric vein:32 (previously 79) cm/s Iliac artery:252 (previously 253) cm/s RESISTIVE INDICES: Head: 0.67 (previously 0.50). Body: 0.56 (previously 0.59). Tail: 0.56 (previously 0.54).     Satisfactory Doppler evaluation the pancreatic allograft, as above. Electronically signed by resident: Jimmie Fuentes Date:    11/07/2022 Time:    13:29 Electronically signed by: Jim Cedeño MD Date:    11/07/2022 Time:    14:29      Current Medications:     Infusions:   sodium chloride 0.9% 75 mL/hr at 12/05/22 1035        Scheduled:   acetaminophen  650 mg Rectal Once    aspirin  81 mg Oral Daily     atorvastatin  40 mg Oral Daily    DAPTOmycin (CUBICIN) IV  10 mg/kg Intravenous Q24H    ganciclovir (CYTOVENE) IVPB  0.625 mg/kg Intravenous Q24H    heparin (porcine)  5,000 Units Subcutaneous Q8H    micafungin (MYCAMINE) IVPB  100 mg Intravenous Q24H    piperacillin-tazobactam (ZOSYN) IVPB  4.5 g Intravenous Q8H    predniSONE  5 mg Oral Daily    sulfamethoxazole-trimethoprim 400-80mg  1 tablet Oral Every Mon, Wed, Fri    tacrolimus  2 mg Sublingual BID    venlafaxine  37.5 mg Oral Daily        PRN:  sodium chloride, sodium chloride, acetaminophen, dextrose 10%, dextrose 10%, glucagon (human recombinant), insulin aspart U-100, morphine, ondansetron, promethazine (PHENERGAN) IVPB, sodium chloride 0.9%, traMADoL

## 2022-12-05 NOTE — HPI
Admit Date: 12/1/22      Reason for Consult: Management of T1DM, Hyperglycemia      Surgical Procedure and Date: Kidney/Pancreas Transplant 11/3/2022     Diabetes diagnosis year: 8 yrs ago     Home Diabetes Medications:  none since pancreas transplant on 11/3/22     How often checking glucose at home? Not checking   Hypoglycemia on the regimen?  No  Missed doses on regimen?  N/A      Diabetes Complications include:     Diabetic nephropathy       Complicating diabetes co morbidities:   ESRD        HPI:    Patient is a 27 y.o. female with ESRD secondary to diabetic nephropathy and HTN, now s/p SPK 11/3/22 (Thymo induction, CMV D-/R+). Her post-op course was complicated by urinary retention requiring Moncada replacement, now resolved. She was recently admitted to the hospital and discharged 11/29/22 after presenting with nausea/vomiting, GEORGE, and had fever. Infectious work up performed. Urine cx had +multiple organisms none in predominance. Kidney US with edema/possible pyelo, ID consulted. She was initially treated with broad spectrum antibiotics. She was discharged on PO cipro x 7 days at [facility]. She now presents to the ED with complaints of ongoing nausea/vomiting, decreased appetite, intermittent abdominal pain. She had ureteral stent removed outpatient yesterday 11/30/22. In ED, she is noted to be tachycardic in the 130s, blood pressure 90s, and fever of 100.4. EKG in ED shows sinus tachycardia. Creatinine remains elevated above baseline. She is receiving 1L LR bolus in ED. Plan for repeat infectious work up, IV antibiotics, IVF hydration, kidney US, and CT A/P. Endocrinology consulted for management of hyperglycemia.

## 2022-12-05 NOTE — NURSING
Lab called this RN with the following results  Potassium: 2.6  Glucose: 838  Calcium: 6.9  A. FLORIN Antonio made aware. Ordered to check POC glucose (133). Repeat labs ordered.

## 2022-12-05 NOTE — PROGRESS NOTES
[facility] - [facility]plant Stepdown  Kidney Transplant  Progress Note      Reason for Follow-up: Reassessment of Kidney, Pancreas Transplant - 11/3/2022  (#1) recipient and management of immunosuppression.    ORGAN:  RIGHT KIDNEY   Donor Type:  Donation after Brain Death       Subjective:   History of Present Illness:  Ms. Read is a 27 y.o.  female with ESRD secondary to diabetic nephropathy and HTN, now s/p SPK 11/3/22 (Thymo induction, CMV D-/R+). Her post-op course was complicated by urinary retention requiring Moncada replacement, now resolved. She was recently admitted to the hospital and discharged 11/29/22 after presenting with nausea/vomiting, GEORGE, and had fever. Infectious work up performed. Urine cx had +multiple organisms none in predominance. Kidney US with edema/possible pyelo, ID consulted. She was initially treated with broad spectrum antibiotics. She was discharged on PO cipro x 7 days at [place]. She now presents to the ED with complaints of ongoing nausea/vomiting, decreased appetite, intermittent abdominal pain. She had ureteral stent removed outpatient yesterday 11/30/22. She was in infusion center today to give IVF but had episode of vomiting so she was sent to the ED. She denies dysuria/urgency. She denies history of gastroparesis (has had gastric emptying study in 2020 that was normal). In ED, she is noted to be tachycardic in the 130s, blood pressure 90s, along with low grade fever 100.4. EKG shows sinus tachycardia. Creatinine remains elevated above baseline. She is receiving 1L LR bolus in ED. Plan for repeat infectious work up, IVF hydration, IV antibiotics, kidney US, and CT A/P. Will also check CMV PCR.    Ms. Read is a 27 y.o. year old female who is status post Kidney, Pancreas Transplant - 11/3/2022  (#1).    Her maintenance immunosuppression consists of:   Immunosuppressants (From admission, onward)      Start     Stop Route Frequency Ordered    12/05/22 1130  tacrolimus  (PROGRAF) capsule (SUBLINGUAL) 2 mg         -- SL 2 times daily 12/05/22 1029            Interval history: ON pt with Tmax 103 and HR in 140's. LA 0.6. UA from 12/1 polymicrobial, positive for E. Faecium & Diphtheroids. Pt was d/w ID, changed cefepime to zosyn, added luis carlos, and stopped dapto. Repeat urine and blood cxs in process. Over the weekend CT AP noted interval development of short segment circumferential wall thickening of the fluid-filled cecum and proximal colon with surrounding stranding and edema, perhaps infectious colitis, typhlitis or nonspecific colopathy. C diff PCR negative. GI pathogens panel ordered. Pancreas US ordered to r/o infarct as cause of pain/fever. Pt states she is feeling better this am, was having n/v during the weekend. NPO, cont IVFs. Temp 102.6 early this morning but has remained afebrile since. WBCs increasing, 19.5. Cr 3.3 from 3.5. DSA's in process. Tentative plan for kidney biopsy Wednesday 12/7. Dc in place for hydro, upsized by urology due CT showing enlarged transplant kidney and distended bladder despite dc in adequate position. Pt also with continued anemia, parvo B19 PCR and Ab in process and iron studies ordered. Will continue to monitor.       Past Medical, Surgical, Family, and Social History:   Unchanged from H&P.    Scheduled Meds:   acetaminophen  650 mg Rectal Once    aspirin  81 mg Oral Daily    atorvastatin  40 mg Oral Daily    DAPTOmycin (CUBICIN) IV  10 mg/kg Intravenous Q24H    ganciclovir (CYTOVENE) IVPB  0.625 mg/kg Intravenous Q24H    heparin (porcine)  5,000 Units Subcutaneous Q8H    micafungin (MYCAMINE) IVPB  100 mg Intravenous Q24H    piperacillin-tazobactam (ZOSYN) IVPB  4.5 g Intravenous Q8H    predniSONE  5 mg Oral Daily    sulfamethoxazole-trimethoprim 400-80mg  1 tablet Oral Every Mon, Wed, Fri    tacrolimus  2 mg Sublingual BID    venlafaxine  37.5 mg Oral Daily     Continuous Infusions:   sodium chloride 0.9% 75 mL/hr at  12/05/22 1035     PRN Meds:sodium chloride, sodium chloride, acetaminophen, dextrose 10%, dextrose 10%, glucagon (human recombinant), insulin aspart U-100, morphine, ondansetron, promethazine (PHENERGAN) IVPB, sodium chloride 0.9%, traMADoL    Intake/Output - Last 3 Shifts         12/03 0700  12/04 0659 12/04 0700 12/05 0659 12/05 0700 12/06 0659    P.O. 2297      I.V. (mL/kg) 3052.8 (49.1) 2070.4 (33.3) 100 (1.6)    Blood 350      IV Piggyback 346.7 144.7 200    Total Intake(mL/kg) 6046.5 (97.2) 2215 (35.6) 300 (4.8)    Urine (mL/kg/hr) 3500 (2.3) 3675 (2.5)     Emesis/NG output 0 150     Stool 0 0     Total Output 3500 3825     Net +2546.5 -1610 +300           Stool Occurrence 5 x 1 x 1 x    Emesis Occurrence 1 x 1 x              Review of Systems   Constitutional:  Positive for activity change, appetite change (decreased appetite), fatigue and fever. Negative for chills.   HENT: Negative.     Eyes: Negative.    Respiratory:  Negative for shortness of breath.    Cardiovascular:  Negative for chest pain and leg swelling.   Gastrointestinal:  Positive for nausea. Negative for abdominal distention, constipation, diarrhea and vomiting.   Endocrine: Negative.    Genitourinary:  Negative for dysuria, hematuria and urgency.   Musculoskeletal: Negative.    Skin:  Positive for wound.   Allergic/Immunologic: Positive for immunocompromised state.   Neurological:  Positive for weakness. Negative for dizziness and light-headedness.   Hematological: Negative.    Psychiatric/Behavioral:  Negative for agitation and confusion. The patient is not nervous/anxious.     Objective:     Vital Signs (Most Recent):  Temp: 98.7 °F (37.1 °C) (12/05/22 1325)  Pulse: (!) 120 (12/05/22 1325)  Resp: 18 (12/05/22 1325)  BP: (!) 103/49 (12/05/22 1325)  SpO2: (!) 94 % (12/05/22 1325)   Vital Signs (24h Range):  Temp:  [98.4 °F (36.9 °C)-103.1 °F (39.5 °C)] 98.7 °F (37.1 °C)  Pulse:  [104-143] 120  Resp:  [18-20] 18  SpO2:  [94 %-99 %] 94 %  BP:  "(103-179)/(49-86) 103/49     Weight: 62.2 kg (137 lb 2 oz)  Height: 5' 4" (162.6 cm)  Body mass index is 23.54 kg/m².    Physical Exam  Vitals and nursing note reviewed.   Constitutional:       General: She is not in acute distress.     Appearance: She is ill-appearing.   Cardiovascular:      Rate and Rhythm: Regular rhythm. Tachycardia present.      Heart sounds: No murmur heard.    No gallop.   Pulmonary:      Effort: Pulmonary effort is normal.      Breath sounds: No wheezing or rales.   Abdominal:      General: There is no distension.      Tenderness: There is abdominal tenderness. There is no guarding or rebound.   Musculoskeletal:         General: No tenderness.      Cervical back: Normal range of motion.      Right lower leg: No edema.      Left lower leg: No edema.   Skin:     General: Skin is warm.   Neurological:      Mental Status: She is alert and oriented to person, place, and time.      Motor: Weakness present.   Psychiatric:         Mood and Affect: Mood normal.         Behavior: Behavior normal.         Thought Content: Thought content normal.         Judgment: Judgment normal.       Laboratory:  CBC:   Recent Labs   Lab 12/04/22  0636 12/04/22 1814 12/05/22  0536   WBC 15.36* 17.30* 19.54*   RBC 3.27* 2.90* 2.88*   HGB 9.8* 8.8* 8.9*   HCT 30.2* 26.0* 26.5*   * 405 394   MCV 92 90 92   MCH 30.0 30.3 30.9   MCHC 32.5 33.8 33.6     BMP:   Recent Labs   Lab 12/04/22  1814 12/04/22 2044 12/05/22  0536   *  838* 172* 120*     139 141 140   K 2.6*  2.6* 3.2* 3.2*   CL 89*  89* 108 104   CO2 40*  40* 21* 21*   BUN 17  17 19 20   CREATININE 3.2*  3.2* 3.5* 3.3*   CALCIUM 6.9*  6.9* 8.7 8.8     Labs within the past 24 hours have been reviewed.    Diagnostic Results:  CT - Abd/Pelvic: No results found. However, due to the size of the patient record, not all encounters were searched. Please check Results Review for a complete set of results.  US - Kidney: Results for orders placed " during the hospital encounter of 12/01/22    US Transplant Kidney With Doppler    Narrative  EXAMINATION:  US TRANSPLANT KIDNEY WITH DOPPLER    CLINICAL HISTORY:  s/p SPK. increase in Cr, severe abdominal pain;    TECHNIQUE:  Transplant renal ultrasound of the right lower quadrant with color flow doppler and duplex analysis.    COMPARISON:  Renal transplant ultrasound 12/03/2022.  12/01/2022.    FINDINGS:  Status post right lower quadrant renal transplant on 11/03/2022.  The renal transplant measures 12.5 cm and demonstrates no focal parenchymal abnormality. Minimal transplant hydronephrosis.  No peritransplant fluid.    Renal arterial resistive indices are as follows: Interlobar 0.77 (previously 0.81), segmental Up 0.75 (previously 0.72), segmental Mid 0.77 (previously 0.78), segmental Low 0.76 (previously 0.80).  There is no evidence of a tardus parvus waveform. The maximum velocity in the main renal artery is 236 (previously 187) cm/sec.  Maximum velocity in the main renal vein is 139 (previously 115) centimeters/second.  RA/iliac ratio 0.97    The renal transplant venous system is unremarkable.    Bladder is decompressed around Moncada catheter.    Impression  Minimal transplant hydronephrosis.    Mild interval increased main renal artery and main renal vein peak systolic velocity compared to prior.  Recommend attention on follow-up.    Intraparenchymal resistive indices are improved compared to multiple prior exams.    Electronically signed by resident: Laura Sifuentes  Date:    12/04/2022  Time:    15:10    Electronically signed by: Zulma Anaya MD  Date:    12/04/2022  Time:    16:05    US - Pancreas: Results for orders placed during the hospital encounter of 12/01/22    US Pancreas Transplant Post    Narrative  EXAMINATION:  US DOPPLER PANCREAS TRANSPLANT POST (XPD)    CLINICAL HISTORY:  s/p SPK; r/o pancreas infarct;    TECHNIQUE:  Grayscale, color flow, and spectral analysis was used to evaluate the  "pancreas allograft using transabdominal ultrasound technique.    COMPARISON:  Ultrasound from 11/25/2022    FINDINGS:  Pancreatic allograft demonstrates heterogeneous area near the pancreatic head neck, not significantly changed compared to prior examination.  There are no peripancreatic fluid collections.    Velocities:    Conduit:216 cm/s    Splenic artery:96 cm/s    Splenic vein:14 cm/s    Portal vein:109 cm/s    Superior mesenteric vein:40 cm/s    Iliac artery:204 cm/s    SMA of Y graft: 38 cm/s    Resistive indices range from 0.62-0.78.    Impression  Satisfactory Doppler evaluation of the pancreatic allograft.    Redemonstration of heterogeneous, hypoechoic area of the pancreatic head/neck with adjacent bowel.  Findings are nonspecific and continued follow-up recommended.      Electronically signed by: Jim Cedeño MD  Date:    12/05/2022  Time:    12:30    Assessment/Plan:     * Sepsis  - Blood cx x 2, ngtd  - UA showed 11 WBCs, moderate bacteria, and 8 hyaline casts  - Urine cx with E. Faecium & Diphtheroids  - ON pt with Tmax 103 and HR in 140's  - LA 0.6  - D/w ID, changed cefepime to zosyn, added luis carlos, and stopped dapto  - Repeat urine and blood cxs in process  - CT AP noted interval development of short segment circumferential wall thickening of the fluid-filled cecum and proximal colon with surrounding stranding and edema, perhaps infectious colitis, typhlitis or nonspecific colopathy  - C diff PCR negative  - CMV in process  - GI pathogens panel ordered  - WBCs increasing, 19.5  - Cr 3.3 from 3.5.      Fever  - See "Sepsis"       Nausea & vomiting  - initial CT A/P without contrast unremarkable   - significant worsening on 12/4 with repeat KUS and CT. US benign, but CT appears to show urine in bladder despite cd  - discussed with surgery  - Dc up sized by urology       Status post pancreas transplantation  - Amylase/lipase WNL   - Pancreas US 11/25 satisfactory  - Repeat Pancreas US ordered " "today  to r/o infarct as source of pain/fever   - Monitor with daily labs      Pyelonephritis, acute  - With GEORGE  - UC now predominant for enterococcus, also diphtheroids present  - On zosyn, added luis carlos, and stopped dapto  - Repeat urine cx in process       Long-term use of immunosuppressant medication  - Continue prograf and steroids. Monitor prograf level daily, monitor for toxic side effects, and adjust for therapeutic dose   - Hold cellcept for GI upset.      Prophylactic immunotherapy  - See long term use of immunosuppressant medication      At risk for opportunistic infections  - Cont OI prophylaxis per protocol.       Status post -donor kidney transplantation  - s/p SPK 11/3/22 for ESRD 2/2 DMI  - See, "GEORGE"      GEORGE (acute kidney injury)  - Cr 3.3 from 3.5  - DSA's in process  - Tentative plan for kidney biopsy   - Had ureteral stent removed 22  - Kidney US showed mild-mod hydro, dc placed  - Repeat US 12/3 with improved hydro and creatinine improved as well  - Dc upsized by urology due CT showing enlarged transplant kidney and distended bladder despite dc in adequate position.       Anemia due to chronic kidney disease  - H/H stable on AM labs  - currently menstruating  - given 1u prbc 12/3/22  - Parvo pending  - Iron studies ordered for the am       Renovascular hypertension  - Hold antihypertensives for now as with hypotension from sepsis vs dehydration  - Assess daily and restart when appropriate      Discharge Planning: Not a candidate for d/c at this time.     Palmira Vivar PA-C  Kidney Transplant  [facility]  "

## 2022-12-05 NOTE — NURSING
Temp down to 100.3 F orally. PRN tylenol administered at 0531. . Pt continues to refuse to stand for BP. FLORIN Alexander aware.

## 2022-12-05 NOTE — NURSING
PCT told this RN that pt does not want to stand for BP at this time. FLORIN Alexander made aware.

## 2022-12-05 NOTE — ASSESSMENT & PLAN NOTE
- initial CT A/P without contrast unremarkable   - significant worsening on 12/4 with repeat KUS and CT. US benign, but CT appears to show urine in bladder despite dc  - discussed with surgery  - Dc up sized by urology

## 2022-12-06 PROBLEM — R65.10 SIRS (SYSTEMIC INFLAMMATORY RESPONSE SYNDROME): Status: ACTIVE | Noted: 2022-12-06

## 2022-12-06 PROBLEM — D64.89 OTHER SPECIFIED ANEMIAS: Status: ACTIVE | Noted: 2022-12-06

## 2022-12-06 LAB
ABO + RH BLD: NORMAL
ALBUMIN SERPL BCP-MCNC: 2.2 G/DL (ref 3.5–5.2)
AMYLASE SERPL-CCNC: 32 U/L (ref 20–110)
ANION GAP SERPL CALC-SCNC: 12 MMOL/L (ref 8–16)
ANION GAP SERPL CALC-SCNC: 13 MMOL/L (ref 8–16)
BACTERIA BLD CULT: NORMAL
BACTERIA BLD CULT: NORMAL
BACTERIA UR CULT: NO GROWTH
BACTERIA UR CULT: NO GROWTH
BASOPHILS # BLD AUTO: 0.05 K/UL (ref 0–0.2)
BASOPHILS NFR BLD: 0.4 % (ref 0–1.9)
BLD GP AB SCN CELLS X3 SERPL QL: NORMAL
BUN SERPL-MCNC: 24 MG/DL (ref 6–20)
BUN SERPL-MCNC: 24 MG/DL (ref 6–20)
CALCIUM SERPL-MCNC: 9 MG/DL (ref 8.7–10.5)
CALCIUM SERPL-MCNC: 9.2 MG/DL (ref 8.7–10.5)
CHLORIDE SERPL-SCNC: 105 MMOL/L (ref 95–110)
CHLORIDE SERPL-SCNC: 106 MMOL/L (ref 95–110)
CLASS I ANTIBODY COMMENTS - LUMINEX: NORMAL
CLASS II ANTIBODY COMMENTS - LUMINEX: NORMAL
CO2 SERPL-SCNC: 21 MMOL/L (ref 23–29)
CO2 SERPL-SCNC: 23 MMOL/L (ref 23–29)
CREAT SERPL-MCNC: 3 MG/DL (ref 0.5–1.4)
CREAT SERPL-MCNC: 3.4 MG/DL (ref 0.5–1.4)
DIFFERENTIAL METHOD: ABNORMAL
DSA1 TESTING DATE: NORMAL
DSA12 TESTING DATE: NORMAL
DSA2 TESTING DATE: NORMAL
EOSINOPHIL # BLD AUTO: 0 K/UL (ref 0–0.5)
EOSINOPHIL NFR BLD: 0.1 % (ref 0–8)
ERYTHROCYTE [DISTWIDTH] IN BLOOD BY AUTOMATED COUNT: 15.8 % (ref 11.5–14.5)
EST. GFR  (NO RACE VARIABLE): 18.2 ML/MIN/1.73 M^2
EST. GFR  (NO RACE VARIABLE): 21.2 ML/MIN/1.73 M^2
FERRITIN SERPL-MCNC: ABNORMAL NG/ML (ref 20–300)
FOLATE SERPL-MCNC: 8.8 NG/ML (ref 4–24)
GLUCOSE SERPL-MCNC: 100 MG/DL (ref 70–110)
GLUCOSE SERPL-MCNC: 107 MG/DL (ref 70–110)
HCT VFR BLD AUTO: 27.9 % (ref 37–48.5)
HGB BLD-MCNC: 9.2 G/DL (ref 12–16)
IMM GRANULOCYTES # BLD AUTO: 0.1 K/UL (ref 0–0.04)
IMM GRANULOCYTES NFR BLD AUTO: 0.7 % (ref 0–0.5)
IRON SERPL-MCNC: 46 UG/DL (ref 30–160)
LIPASE SERPL-CCNC: 7 U/L (ref 4–60)
LYMPHOCYTES # BLD AUTO: 0.6 K/UL (ref 1–4.8)
LYMPHOCYTES NFR BLD: 4.4 % (ref 18–48)
MAGNESIUM SERPL-MCNC: 2.1 MG/DL (ref 1.6–2.6)
MCH RBC QN AUTO: 29.9 PG (ref 27–31)
MCHC RBC AUTO-ENTMCNC: 33 G/DL (ref 32–36)
MCV RBC AUTO: 91 FL (ref 82–98)
MONOCYTES # BLD AUTO: 0.7 K/UL (ref 0.3–1)
MONOCYTES NFR BLD: 5.3 % (ref 4–15)
NEUTROPHILS # BLD AUTO: 12.3 K/UL (ref 1.8–7.7)
NEUTROPHILS NFR BLD: 89.1 % (ref 38–73)
NRBC BLD-RTO: 0 /100 WBC
PARVOVIRUS B19 ABS IGG & IGM: ABNORMAL
PARVOVIRUS B19 IGG ANTIBODY: POSITIVE
PARVOVIRUS B19 IGM ANTIBODY: NEGATIVE
PHOSPHATE SERPL-MCNC: 3.3 MG/DL (ref 2.7–4.5)
PLATELET # BLD AUTO: 491 K/UL (ref 150–450)
PMV BLD AUTO: 9.9 FL (ref 9.2–12.9)
POCT GLUCOSE: 103 MG/DL (ref 70–110)
POCT GLUCOSE: 104 MG/DL (ref 70–110)
POCT GLUCOSE: 108 MG/DL (ref 70–110)
POCT GLUCOSE: 96 MG/DL (ref 70–110)
POCT GLUCOSE: 97 MG/DL (ref 70–110)
POTASSIUM SERPL-SCNC: 2.8 MMOL/L (ref 3.5–5.1)
POTASSIUM SERPL-SCNC: 3.4 MMOL/L (ref 3.5–5.1)
RBC # BLD AUTO: 3.08 M/UL (ref 4–5.4)
SATURATED IRON: 35 % (ref 20–50)
SERUM COLLECTION DT - LUMINEX CLASS I: NORMAL
SERUM COLLECTION DT - LUMINEX CLASS II: NORMAL
SODIUM SERPL-SCNC: 140 MMOL/L (ref 136–145)
SODIUM SERPL-SCNC: 140 MMOL/L (ref 136–145)
TACROLIMUS BLD-MCNC: 17.5 NG/ML (ref 5–15)
TOTAL IRON BINDING CAPACITY: 132 UG/DL (ref 250–450)
TRANSFERRIN SERPL-MCNC: 89 MG/DL (ref 200–375)
VIT B12 SERPL-MCNC: 659 PG/ML (ref 210–950)
WBC # BLD AUTO: 13.76 K/UL (ref 3.9–12.7)

## 2022-12-06 PROCEDURE — 99232 SBSQ HOSP IP/OBS MODERATE 35: CPT | Mod: ,,, | Performed by: INTERNAL MEDICINE

## 2022-12-06 PROCEDURE — 83735 ASSAY OF MAGNESIUM: CPT | Performed by: INTERNAL MEDICINE

## 2022-12-06 PROCEDURE — 25000003 PHARM REV CODE 250: Performed by: INTERNAL MEDICINE

## 2022-12-06 PROCEDURE — 84466 ASSAY OF TRANSFERRIN: CPT | Performed by: INTERNAL MEDICINE

## 2022-12-06 PROCEDURE — 99233 PR SUBSEQUENT HOSPITAL CARE,LEVL III: ICD-10-PCS | Mod: ,,, | Performed by: PHYSICIAN ASSISTANT

## 2022-12-06 PROCEDURE — 86850 RBC ANTIBODY SCREEN: CPT | Performed by: INTERNAL MEDICINE

## 2022-12-06 PROCEDURE — 99233 SBSQ HOSP IP/OBS HIGH 50: CPT | Mod: ,,, | Performed by: PHYSICIAN ASSISTANT

## 2022-12-06 PROCEDURE — 63600175 PHARM REV CODE 636 W HCPCS: Performed by: PHYSICIAN ASSISTANT

## 2022-12-06 PROCEDURE — 85025 COMPLETE CBC W/AUTO DIFF WBC: CPT | Performed by: INTERNAL MEDICINE

## 2022-12-06 PROCEDURE — 83690 ASSAY OF LIPASE: CPT | Performed by: INTERNAL MEDICINE

## 2022-12-06 PROCEDURE — 63600175 PHARM REV CODE 636 W HCPCS: Performed by: INTERNAL MEDICINE

## 2022-12-06 PROCEDURE — 80048 BASIC METABOLIC PNL TOTAL CA: CPT | Performed by: PHYSICIAN ASSISTANT

## 2022-12-06 PROCEDURE — 82728 ASSAY OF FERRITIN: CPT | Performed by: INTERNAL MEDICINE

## 2022-12-06 PROCEDURE — 20600001 HC STEP DOWN PRIVATE ROOM

## 2022-12-06 PROCEDURE — 99222 1ST HOSP IP/OBS MODERATE 55: CPT | Mod: GC,,, | Performed by: STUDENT IN AN ORGANIZED HEALTH CARE EDUCATION/TRAINING PROGRAM

## 2022-12-06 PROCEDURE — 80197 ASSAY OF TACROLIMUS: CPT | Performed by: INTERNAL MEDICINE

## 2022-12-06 PROCEDURE — 99222 PR INITIAL HOSPITAL CARE,LEVL II: ICD-10-PCS | Mod: GC,,, | Performed by: STUDENT IN AN ORGANIZED HEALTH CARE EDUCATION/TRAINING PROGRAM

## 2022-12-06 PROCEDURE — 99233 PR SUBSEQUENT HOSPITAL CARE,LEVL III: ICD-10-PCS | Mod: GC,,, | Performed by: INTERNAL MEDICINE

## 2022-12-06 PROCEDURE — 99233 SBSQ HOSP IP/OBS HIGH 50: CPT | Mod: GC,,, | Performed by: INTERNAL MEDICINE

## 2022-12-06 PROCEDURE — 82607 VITAMIN B-12: CPT | Performed by: INTERNAL MEDICINE

## 2022-12-06 PROCEDURE — 80069 RENAL FUNCTION PANEL: CPT | Performed by: INTERNAL MEDICINE

## 2022-12-06 PROCEDURE — 94761 N-INVAS EAR/PLS OXIMETRY MLT: CPT

## 2022-12-06 PROCEDURE — 99232 PR SUBSEQUENT HOSPITAL CARE,LEVL II: ICD-10-PCS | Mod: ,,, | Performed by: INTERNAL MEDICINE

## 2022-12-06 PROCEDURE — 25000003 PHARM REV CODE 250: Performed by: PHYSICIAN ASSISTANT

## 2022-12-06 PROCEDURE — 82746 ASSAY OF FOLIC ACID SERUM: CPT | Performed by: INTERNAL MEDICINE

## 2022-12-06 PROCEDURE — 82150 ASSAY OF AMYLASE: CPT | Performed by: INTERNAL MEDICINE

## 2022-12-06 RX ORDER — POTASSIUM CHLORIDE 7.45 MG/ML
10 INJECTION INTRAVENOUS
Status: COMPLETED | OUTPATIENT
Start: 2022-12-06 | End: 2022-12-06

## 2022-12-06 RX ORDER — POTASSIUM CHLORIDE 750 MG/1
40 CAPSULE, EXTENDED RELEASE ORAL ONCE
Status: DISCONTINUED | OUTPATIENT
Start: 2022-12-06 | End: 2022-12-06

## 2022-12-06 RX ORDER — INSULIN ASPART 100 [IU]/ML
0-5 INJECTION, SOLUTION INTRAVENOUS; SUBCUTANEOUS
Status: DISCONTINUED | OUTPATIENT
Start: 2022-12-06 | End: 2022-12-09

## 2022-12-06 RX ORDER — POTASSIUM CHLORIDE 7.45 MG/ML
10 INJECTION INTRAVENOUS
Status: COMPLETED | OUTPATIENT
Start: 2022-12-06 | End: 2022-12-07

## 2022-12-06 RX ORDER — IBUPROFEN 200 MG
24 TABLET ORAL
Status: DISCONTINUED | OUTPATIENT
Start: 2022-12-06 | End: 2022-12-15 | Stop reason: HOSPADM

## 2022-12-06 RX ORDER — POTASSIUM CHLORIDE 20 MEQ/1
20 TABLET, EXTENDED RELEASE ORAL ONCE
Status: DISCONTINUED | OUTPATIENT
Start: 2022-12-06 | End: 2022-12-06

## 2022-12-06 RX ORDER — IBUPROFEN 200 MG
16 TABLET ORAL
Status: DISCONTINUED | OUTPATIENT
Start: 2022-12-06 | End: 2022-12-15 | Stop reason: HOSPADM

## 2022-12-06 RX ORDER — GLUCAGON 1 MG
1 KIT INJECTION
Status: DISCONTINUED | OUTPATIENT
Start: 2022-12-06 | End: 2022-12-15 | Stop reason: HOSPADM

## 2022-12-06 RX ADMIN — POTASSIUM CHLORIDE 10 MEQ: 7.46 INJECTION, SOLUTION INTRAVENOUS at 04:12

## 2022-12-06 RX ADMIN — PIPERACILLIN SODIUM AND TAZOBACTAM SODIUM 4.5 G: 4; .5 INJECTION, POWDER, LYOPHILIZED, FOR SOLUTION INTRAVENOUS at 12:12

## 2022-12-06 RX ADMIN — PREDNISONE 5 MG: 5 TABLET ORAL at 01:12

## 2022-12-06 RX ADMIN — GANCICLOVIR SODIUM 39 MG: 50 INJECTION, POWDER, LYOPHILIZED, FOR SOLUTION INTRAVENTRICULAR at 05:12

## 2022-12-06 RX ADMIN — POTASSIUM CHLORIDE 10 MEQ: 7.46 INJECTION, SOLUTION INTRAVENOUS at 02:12

## 2022-12-06 RX ADMIN — POTASSIUM CHLORIDE 10 MEQ: 7.46 INJECTION, SOLUTION INTRAVENOUS at 01:12

## 2022-12-06 RX ADMIN — POTASSIUM CHLORIDE 10 MEQ: 7.46 INJECTION, SOLUTION INTRAVENOUS at 10:12

## 2022-12-06 RX ADMIN — PIPERACILLIN SODIUM AND TAZOBACTAM SODIUM 4.5 G: 4; .5 INJECTION, POWDER, LYOPHILIZED, FOR SOLUTION INTRAVENOUS at 08:12

## 2022-12-06 RX ADMIN — POTASSIUM CHLORIDE 10 MEQ: 7.46 INJECTION, SOLUTION INTRAVENOUS at 08:12

## 2022-12-06 RX ADMIN — VENLAFAXINE HYDROCHLORIDE 37.5 MG: 37.5 CAPSULE, EXTENDED RELEASE ORAL at 01:12

## 2022-12-06 RX ADMIN — MUPIROCIN: 20 OINTMENT TOPICAL at 01:12

## 2022-12-06 RX ADMIN — ATORVASTATIN CALCIUM 40 MG: 40 TABLET, FILM COATED ORAL at 01:12

## 2022-12-06 RX ADMIN — TACROLIMUS 2 MG: 1 CAPSULE ORAL at 10:12

## 2022-12-06 RX ADMIN — SODIUM CHLORIDE: 0.9 INJECTION, SOLUTION INTRAVENOUS at 01:12

## 2022-12-06 RX ADMIN — SODIUM CHLORIDE: 0.9 INJECTION, SOLUTION INTRAVENOUS at 12:12

## 2022-12-06 RX ADMIN — MUPIROCIN: 20 OINTMENT TOPICAL at 08:12

## 2022-12-06 RX ADMIN — ASPIRIN 81 MG 81 MG: 81 TABLET ORAL at 01:12

## 2022-12-06 NOTE — CONSULTS
[facility] - Transplant Stepdown  Hematology/Oncology  Consult Note    Patient Name: Isabela Read  MRN: [id number]  Admission Date: 12/1/2022  Hospital Length of Stay: 4 days  Code Status: Full Code   Attending Provider: Melani Mcintyre MD  Consulting Provider: Hemant Naidu MD  Primary Care Physician: Tavo Bhatia MD  Principal Problem:Sepsis due to Enterococcus    Inpatient consult to Hematology/Oncology  Consult performed by: Hemant Naidu MD  Consult ordered by: Daniella Vargas PA-C        Subjective:     HPI:  27 years old female patient with with ESRD secondary to diabetic nephropathy and HTN, now s/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 She presented to hospital with nausea and weakness, and is being treated for presumed colitis. She was febrile on admission, and had leucocytosis. She was started on broad spectrum antibiotics. She had recent UTI and was treated for it. During hospitalzation, she developed diarrhea, and had a CT CAP which showed possible colitis.   She had a drop in her hemoglobin and required 1U PRBC for hemoglobin 6.5 g/dl with good response. Work-up for anemia has lead to finding ferritin level of about 91675. Hematology is consulted for hyperferricemia in the setting of fevers and possible HLH     On interview today, she was pleasant and in no distress. She reported feeling nauseous on admission and developed diarrhea while in the hospital. She has not required blood transfusions after her transplant. Her fever curve improved with antibiotics.       Oncology Treatment Plan:   [No matching plan found]    Medications:  Continuous Infusions:   sodium chloride 0.9% 75 mL/hr at 12/06/22 1327     Scheduled Meds:   aspirin  81 mg Oral Daily    atorvastatin  40 mg Oral Daily    ganciclovir (CYTOVENE) IVPB  0.625 mg/kg Intravenous Q24H    heparin (porcine)  5,000 Units Subcutaneous Q8H    micafungin (MYCAMINE) IVPB  100 mg Intravenous Q24H     mupirocin   Nasal BID    piperacillin-tazobactam (ZOSYN) IVPB  4.5 g Intravenous Q8H    potassium chloride  10 mEq Intravenous Q1H    predniSONE  5 mg Oral Daily    sulfamethoxazole-trimethoprim 400-80mg  1 tablet Oral Every Mon, Wed, Fri    tacrolimus  2 mg Sublingual BID    venlafaxine  37.5 mg Oral Daily     PRN Meds:sodium chloride 0.9%, acetaminophen, dextrose 10%, dextrose 10%, dextrose 10%, dextrose 10%, glucagon (human recombinant), glucose, glucose, insulin aspart U-100, morphine, ondansetron, promethazine (PHENERGAN) IVPB, sodium chloride 0.9%, traMADoL     Review of patient's allergies indicates:  No Known Allergies     Past Medical History:   Diagnosis Date    Acute kidney injury superimposed on chronic kidney disease 4/7/2021    Anemia     Chronic hypertension with exacerbation during pregnancy in second trimester 11/6/2020    Current regimen (11/6/20):  - Carvedilol 12.5 mg BID - Nifedipine 30 mg daily Baseline CKD + proteinuria    Chronic kidney disease     Depression     Diabetes mellitus     Diabetic retinopathy     Diarrhea     Encounter for blood transfusion     End stage renal failure on dialysis     Gastroparesis     Glaucoma     Hepatomegaly 4/29/2021    Hx of psychiatric care     Hyperlipidemia     Hypertension     Nephrotic syndrome     Nephrotic syndrome 3/4/2021    Nonspecific reaction to tuberculin skin test without active tuberculosis 10/1/2021    Palpitations     Poor fetal growth affecting management of mother in second trimester 10/15/2020    Restrictive lung disease     Retinal detachment     Severe pre-eclampsia in second trimester 11/6/2020    Type 1 diabetes mellitus with end-stage renal disease (ESRD) 2/24/2015    Followed by Dr. Mayo.  Last A1C was 13  Currently on lantus 20 units qAM and humolog 10 units with meals  - a fetal echocardiogram around 22-24 weeks - needs eye exam, podiatry exam  - needs EKG, maternal echo and fu with cardiology  - ASA  81mg, folic acid 4mg PO daily - hemoglobin A1c every 4-6 weeks -24 hour urine for protein and creatinine clearance should be performed. Patient will also need a     Past Surgical History:   Procedure Laterality Date    AV FISTULA PLACEMENT Left 04/07/2021    Procedure: CREATION, AV FISTULA;  Surgeon: Roberto Ryan MD;  Location: [facility] OR 2ND FLR;  Service: Peripheral Vascular;  Laterality: Left;    COLONOSCOPY N/A 03/16/2022    Procedure: COLONOSCOPY;  Surgeon: Tavo Kwok MD;  Location: [facility] (4TH FLR);  Service: Endoscopy;  Laterality: N/A;  Questionable history of delayed gastric emptying longstanding diabetes now on eating pre transplant workup for history of nausea vomiting which seems to have improved with dialysis also chronic diarrhea and history of anemia pre transplant workup for kidney transplant. 3    CYSTOSCOPY      ESOPHAGOGASTRODUODENOSCOPY N/A 03/16/2022    Procedure: EGD (ESOPHAGOGASTRODUODENOSCOPY);  Surgeon: Tavo Kwok MD;  Location: [facility] ENDO (4TH FLR);  Service: Endoscopy;  Laterality: N/A;  Questionable history of delayed gastric emptying longstanding diabetes now on eating pre transplant workup for history of nausea vomiting which seems to have improved with dialysis also chronic diarrhea and history of anemia pre transplant workup for    FISTULOGRAM N/A 08/11/2021    Procedure: Fistulogram;  Surgeon: Roberto Ryan MD;  Location: [facility] CATH LAB;  Service: Cardiology;  Laterality: N/A;    KIDNEY TRANSPLANT Right 11/02/2022    Procedure: TRANSPLANT, KIDNEY;  Surgeon: Kumar Rodriges Jr., MD;  Location: [facility] OR 2ND FLR;  Service: Transplant;  Laterality: Right;    LASER PHOTOCOAGULATION OF RETINA Right 05/31/2022    Procedure: PHOTOCOAGULATION, RETINA, USING LASER;  Surgeon: Maximilian Montaño MD;  Location: [facility] OR 1ST FLR;  Service: Ophthalmology;  Laterality: Right;    PERCUTANEOUS TRANSLUMINAL ANGIOPLASTY OF ARTERIOVENOUS FISTULA N/A 08/11/2021     Procedure: PTA, AV FISTULA;  Surgeon: Roberto Ryan MD;  Location: [facility] CATH LAB;  Service: Cardiology;  Laterality: N/A;    REMOVAL IMPLANT, POSTERIOR SEGMENT, INTRAOCULAR Right 02/01/2022    Procedure: REMOVAL IMPLANT, POSTERIOR SEGMENT, INTRAOCULAR;  Surgeon: Maximilian Montaño MD;  Location: [facility] OR 1ST FLR;  Service: Ophthalmology;  Laterality: Right;    REPAIR OF RETINAL DETACHMENT WITH VITRECTOMY Right 01/25/2022    Procedure: REPAIR, RETINAL DETACHMENT, WITH VITRECTOMY, MEMBRANE PEEL, LASER, INJECTION OF GAS VS OIL;  Surgeon: Maximilian Montaño MD;  Location: [facility] OR 1ST FLR;  Service: Ophthalmology;  Laterality: Right;    REVISION OF ARTERIOVENOUS FISTULA Left 12/10/2021    Procedure: REVISION, AV FISTULA with BVT;  Surgeon: Roberto Ryan MD;  [facility];  Service: Peripheral Vascular;  Laterality: Left;    TRANSPLANTATION OF PANCREAS N/A 11/02/2022    Procedure: TRANSPLANT, PANCREAS;  Surgeon: Kumar Rodriges Jr., MD;  [facility];  Service: Transplant;  Laterality: N/A;    VITRECTOMY BY PARS PLANA APPROACH Right 02/01/2022    Procedure: VITRECTOMY, PARS PLANA APPROACH;  Surgeon: Maximilian Montaño MD;  [facility];  Service: Ophthalmology;  Laterality: Right;    VITRECTOMY BY PARS PLANA APPROACH Right 05/31/2022    Procedure: VITRECTOMY, PARS PLANA APPROACH;  Surgeon: Maximilian Montaño MD;  [facility];  Service: Ophthalmology;  Laterality: Right;     Family History       Problem Relation (Age of Onset)    Diabetes Brother    Heart disease Father    Hypertension Mother          Tobacco Use    Smoking status: Never    Smokeless tobacco: Never   Substance and Sexual Activity    Alcohol use: No    Drug use: No    Sexual activity: Not Currently     Partners: Male     Comment: monogamous       Review of Systems   Constitutional:  Positive for appetite change.   HENT: Negative.     Respiratory:  Negative for shortness  of breath.    Gastrointestinal:  Positive for diarrhea. Negative for abdominal distention, abdominal pain, blood in stool and nausea.   Endocrine: Negative.    Genitourinary:  Negative for dysuria.   Musculoskeletal: Negative.    Neurological:  Negative for headaches.   Hematological: Negative.       Objective:     Vital Signs (Most Recent):  Temp: 97.8 °F (36.6 °C) (12/06/22 1204)  Pulse: 99 (12/06/22 1529)  Resp: 18 (12/06/22 1204)  BP: (!) 129/99 (12/06/22 1204)  SpO2: 100 % (12/06/22 1204)   Vital Signs (24h Range):  Temp:  [97.5 °F (36.4 °C)-98.2 °F (36.8 °C)] 97.8 °F (36.6 °C)  Pulse:  [] 99  Resp:  [18] 18  SpO2:  [95 %-100 %] 100 %  BP: (119-134)/(55-99) 129/99     Weight: 62.2 kg (137 lb 2 oz)  Body mass index is 23.54 kg/m².  Body surface area is 1.68 meters squared.      Intake/Output Summary (Last 24 hours) at 12/6/2022 1542  Last data filed at 12/6/2022 1340  Gross per 24 hour   Intake 3197.72 ml   Output 1480 ml   Net 1717.72 ml       Physical Exam  Constitutional:       General: She is not in acute distress.     Appearance: Normal appearance.   HENT:      Head: Normocephalic and atraumatic.   Eyes:      Pupils: Pupils are equal, round, and reactive to light.   Cardiovascular:      Rate and Rhythm: Normal rate and regular rhythm.      Heart sounds: No murmur heard.  Pulmonary:      Effort: No respiratory distress.      Breath sounds: Normal breath sounds. No wheezing.   Abdominal:      General: Abdomen is flat. Bowel sounds are normal. There is no distension.      Palpations: Abdomen is soft. There is no mass.      Tenderness: There is abdominal tenderness (at incision site).   Musculoskeletal:         General: No swelling or deformity.   Skin:     Coloration: Skin is not jaundiced.   Neurological:      General: No focal deficit present.      Mental Status: She is alert and oriented to person, place, and time. Mental status is at baseline.       Significant Labs:   CBC:   Recent Labs   Lab  12/04/22  1814 12/05/22  0536 12/06/22  1020   WBC 17.30* 19.54* 13.76*   HGB 8.8* 8.9* 9.2*   HCT 26.0* 26.5* 27.9*    394 491*   , CMP:   Recent Labs   Lab 12/04/22  1814 12/04/22  2044 12/05/22  0536 12/06/22  1020     139 141 140 140   K 2.6*  2.6* 3.2* 3.2* 2.8*   CL 89*  89* 108 104 106   CO2 40*  40* 21* 21* 21*   *  838* 172* 120* 107   BUN 17  17 19 20 24*   CREATININE 3.2*  3.2* 3.5* 3.3* 3.0*   CALCIUM 6.9*  6.9* 8.7 8.8 9.0   PROT 5.1*  --   --   --    ALBUMIN 2.0*  2.0* 2.5* 2.3* 2.2*   BILITOT 0.4  --   --   --    ALKPHOS 74  --   --   --    AST 15  --   --   --    ALT 15  --   --   --    ANIONGAP 10  10 12 15 13   , Coagulation: No results for input(s): PT, INR, APTT in the last 48 hours., Haptoglobin: No results for input(s): HAPTOGLOBIN in the last 48 hours., and LDH: No results for input(s): LDHCSF, BFSOURCE in the last 48 hours.    Diagnostic Results:  I have reviewed and interpreted all pertinent imaging results/findings within the past 24 hours.    Assessment/Plan:     Other specified anemias  27 years old female patient s/p kidney and pancreas transplant 11/03/2022 who is currently admitted for sepsis likely from colitis. She has chronic anemia which worsened this admission requiring a unit of PRBC with good response.     Work-up was significant of [place] [address]. She has normal/high haptoglobin, and slightly elevated LDH.     Plan:   - hyperferritinemia likely in the setting of sepsis and inflammation. Though, a level of 95611 is unusually high to be explained just by inflammation.   - HLH is unlikely as well giving the overall picture. She has no cytopenia other than anemia which is chronic. She has normal spleen size. Can check Fibrinogen, and triglycerides to calculate H-score.   - Continue antibiotics coverage for colitis and monitor fever curve.             Thank you for your consult. I will follow-up with patient. Please contact us if you have  any additional questions.    Hemant Naidu MD  Hematology/Oncology  [facility] - Trans[facility]

## 2022-12-06 NOTE — PROGRESS NOTES
Update:      spoke with patient and mother in room to assess for needs. No needs reported at this time, patient and caregiver agree to inform  of questions, concerns, or needs as they arise.     4pm update:    Patient's mother stopped this  in the lobby of [facility] and requested to speak with her. Patient's mother stated that she received a ticket for parking in a 2 hour spot on September 14th, 2022 and she would like a letter from a medical provider stating that her daughter was in the hospital during this time.     Patient's mother also requested a letter from a medical provider stating that her daughter is currently in the hospital and that patient's mother will not be able to present for jury duty on 12/9/2022.  informed patient's mother that both requests would be presented to the medical team at rounds tomorrow 12/7/22.     remains available,     Makayla Bess LMSW

## 2022-12-06 NOTE — CARE UPDATE
BG goal 140-180    -A1C   Lab Results   Component Value Date    HGBA1C 11.1 (H) 11/02/2022       -HOME REGIMEN: none since pancreas transplant on 11/3/22    -INPATIENT REGIMEN: Novolog Correction Scale     -GLUCOSE TREND FOR THE PAST 24HRS:  103-137    -NO HYPOGYCEMIAS NOTED     -TOLERATING 50% OF PO DIET     -Diet: Diet Low Potassium      -Steroids - Prednisone 5 mg daily     -Tube Feeds -  N/A       Remains in TSU. BG within goal ranges without insulin requirements.       Plan:  -Continue Low Dose Correction Scale  -BG monitoring ac/hs    Discharge planning: likely no needs    Endocrine to continue to follow    ** Please call Endocrine for any BG related issues **

## 2022-12-06 NOTE — PLAN OF CARE
"Plan of care reviewed on am rounds,  afrebrile this shift, vss, tele sr-st.wbc up 19  h/h stable since transfusion on 12/3.Cr trended down 3.3 [place] intact/patent with good urine output, u/a with cx resent. Mg 1.3 ( replaced), amylase/lipase normal. Pancreas u/s completed.Denies any c/o abd pain or n/v today. Remains NPO except meds/ ice chips but unable to take po meds " without any food "IV antibiotics/antiviral/antifungal therapy continued with ID following.One episode of stool incontinence ( liquid brown) which patient states she has experienced in the past requiring her to wear adult diapers. Up with standby assist, encouraged out of bed/ and walking. Patient refusing DVT prophylaxis ( teds and SQ heparin ) with PA aware. Emotional support provided throughout shift.  "

## 2022-12-06 NOTE — PROGRESS NOTES
[facility] Progress Note    Assessment/Plan:     Isabela Read is a 27 y.o.  female with ESRD secondary to diabetic nephropathy and HTN, now s/p SPK 11/3/22 (Thymo induction, CMV D-/R+). Her post-op course was complicated by urinary retention requiring Moncada replacement, now resolved. She was recently admitted to the hospital and discharged 11/29/22 after presenting with nausea/vomiting, GEORGE, and had fever. Infectious work up performed. Urine cx had +multiple organisms none in predominance. Kidney US with edema/possible pyelo, ID consulted. She was initially treated with broad spectrum antibiotics. She was discharged on PO cipro x 7 days at [place]. 12/1 presentation of complaints of ongoing nausea/vomiting, decreased appetite, intermittent abdominal pain. She had ureteral stent removed outpatient 11/30/22. 12/1 CT AP noted for air w/in allograft, perhaps related to interval removal of stent as per read.    12/4 CT AP noted interval development of short segment circumferential wall thickening of the fluid-filled cecum and proximal colon with surrounding stranding and edema, perhaps infectious colitis, typhlitis or nonspecific colopathy. New mild diffuse anasarca. Extensive atherosclerotic disease of the mesenteric and proximal lower extremity vasculature.     12/1 CMV pcr negative  12/1 bcx ngtd  12/3 RIP negative  12/4 bcx ngtd  12/4 C. Diff pcr negative  12/5 LA 0.6    Fevers in KTX pt, Typhlitis  -12/1 polymicrobial UA, Ucx E. Faecium & Diphtheroids  -12/4 Ucx ng  -12/4 Parvovirus B19 PCR and Ab (pending)  -GI pathogens panel (pending)    -continue zosyn and micafungin  -supportive care    Victor Hugo Carbajal MD  [facility]  Cell: [phone]    Thank you for allowing us to participate in the care of this patient. We will continue to follow along. Case has been discussed with consult staff, who is in agreement with assessment and plan. ID will continue to follow.      Subjective:      HPI:  Isabela Read is a 27 y.o.  female with ESRD secondary to diabetic nephropathy and HTN, now s/p SPK 11/3/22 (Thymo induction, CMV D-/R+). Her post-op course was complicated by urinary retention requiring Moncada replacement, now resolved. She was recently admitted to the hospital and discharged 22 after presenting with nausea/vomiting, GEORGE, and had fever. Infectious work up performed. Urine cx had +multiple organisms none in predominance. Kidney US with edema/possible pyelo, ID consulted. She was initially treated with broad spectrum antibiotics. She was discharged on PO cipro x 7 days at [facility].    presentation of complaints of ongoing nausea/vomiting, decreased appetite, intermittent abdominal pain. She had ureteral stent removed outpatient 22.  UA hazy, nitrate neg, LE trace, moderate bacteria.    Interval events:  When seen today pt able to tolerate some small PO intake, denies nv, RLQ pain, ha. Ptnoted improvement in her sxs.  Noted improvement in fever curve, afebrile in 24h. Satting RA, normotensive. WBC and renal fxn improving.     Objective:   Last 24 Hour Vital Signs:  BP  Min: 103/49  Max: 134/70  Temp  Av °F (36.7 °C)  Min: 97.5 °F (36.4 °C)  Max: 98.7 °F (37.1 °C)  Pulse  Av.1  Min: 82  Max: 124  Resp  Av  Min: 18  Max: 18  SpO2  Av.7 %  Min: 94 %  Max: 99 %  I/O last 3 completed shifts:  In: 3292.7 [P.O.:505; I.V.:1946.2; IV Piggyback:841.6]  Out: 3280 [Urine:3280]    Physical Exam  Vitals and nursing note reviewed.   Constitutional:       General: She is not in acute distress.  HENT:      Head: Normocephalic.      Nose: Nose normal.   Cardiovascular:      Rate and Rhythm: Regular rhythm.      Heart sounds: No murmur heard.    No gallop.   Pulmonary:      Effort: Pulmonary effort is normal.      Breath sounds: No wheezing or rales.   Abdominal:      General: There is no distension.      Tenderness: There is no  abdominal tenderness. There is no guarding or rebound.      Comments: surgical incision cdi  Musculoskeletal:         General: Normal range of motion.      Cervical back: Normal range of motion.      Right lower leg: No edema.      Left lower leg: No edema.   Skin:     General: Skin is warm and dry.   Neurological:      Mental Status: She is alert and oriented to person, place, and time.   Psychiatric:         Mood and Affect: Mood normal.         Behavior: Behavior normal.         Thought Content: Thought content normal.         Judgment: Judgment normal.     Laboratory:  Laboratory Data Reviewed: yes  Pertinent Findings:  Recent Labs   Lab 12/01/22  0909 12/01/22  1630 12/02/22  0622 12/04/22  0636 12/04/22  1814 12/04/22  2044 12/05/22  0536   WBC 11.09 14.22*   < > 15.36* 17.30*  --  19.54*   HGB 9.5* 7.4*   < > 9.8* 8.8*  --  8.9*   HCT 29.2* 22.8*   < > 30.2* 26.0*  --  26.5*   * 414   < > 452* 405  --  394   MCV 95 97   < > 92 90  --  92   RDW 14.8* 15.2*   < > 16.0* 15.7*  --  15.8*   * 131*   < > 141 139  139 141 140   K 4.2 4.6   < > 3.4* 2.6*  2.6* 3.2* 3.2*    105   < > 109 89*  89* 108 104   CO2 22* 17*   < > 20* 40*  40* 21* 21*   BUN 19 20   < > 21* 17  17 19 20   CREATININE 2.8* 3.0*   < > 3.3* 3.2*  3.2* 3.5* 3.3*    118*   < > 91 838*  838* 172* 120*   PROT 7.9 6.7  --   --  5.1*  --   --    ALBUMIN 3.2*  3.2* 2.7*   < > 2.7* 2.0*  2.0* 2.5* 2.3*   BILITOT 0.3 0.3  --   --  0.4  --   --    AST 10 17  --   --  15  --   --    ALKPHOS 97 77  --   --  74  --   --    ALT 10 10  --   --  15  --   --     < > = values in this interval not displayed.         Microbiology Data:  Microbiology Results (last 7 days)       Procedure Component Value Units Date/Time    Blood culture [[id number]] Collected: 12/04/22 2343    Order Status: Completed Specimen: Blood Updated: 12/06/22 0613     Blood Culture, Routine No Growth to date      No Growth to date    Blood culture [[id number]]  Collected: 12/04/22 2343    Order Status: Completed Specimen: Blood Updated: 12/06/22 0613     Blood Culture, Routine No Growth to date      No Growth to date    Urine Culture High Risk [[id number]] Collected: 12/04/22 2049    Order Status: Completed Specimen: Urine Updated: 12/06/22 0047     Urine Culture, Routine No growth    Narrative:      Indicated criteria for high risk culture:->Kidney transplant  < 2 months    Blood culture [[id number]] Collected: 12/04/22 2044    Order Status: Completed Specimen: Blood Updated: 12/05/22 2212     Blood Culture, Routine No Growth to date      No Growth to date    Blood culture [[id number]] Collected: 12/04/22 1814    Order Status: Completed Specimen: Blood Updated: 12/05/22 2012     Blood Culture, Routine No Growth to date      No Growth to date    Blood culture - site #1 [[id number]] Collected: 12/01/22 1632    Order Status: Completed Specimen: Blood from Peripheral, Wrist, Right Updated: 12/05/22 1812     Blood Culture, Routine No Growth to date      No Growth to date      No Growth to date      No Growth to date      No Growth to date    Blood culture - site #2 [[id number]] Collected: 12/01/22 1652    Order Status: Completed Specimen: Blood from Peripheral, Hand, Right Updated: 12/05/22 1812     Blood Culture, Routine No Growth to date      No Growth to date      No Growth to date      No Growth to date      No Growth to date    Urine Culture High Risk [[id number]] Collected: 12/05/22 1219    Order Status: Sent Specimen: Urine Updated: 12/05/22 1516    Urine culture [[id number]]  (Abnormal)  (Susceptibility) Collected: 12/01/22 2319    Order Status: Completed Specimen: Urine Updated: 12/05/22 1049     Urine Culture, Routine Multiple organisms isolated. None in predominance.  Repeat if      clinically necessary.      ENTEROCOCCUS FAECIUM  10,000 - 49,999 cfu/ml        DIPHTHEROIDS  < 10,000 cfu/ml      Narrative:      Specimen Source->Urine    C Diff Toxin by PCR [[id number]]  Collected: 12/04/22 1251    Order Status: Completed Updated: 12/05/22 0307     C. diff PCR Negative    Clostridium difficile EIA [[id number]]  (Abnormal) Collected: 12/04/22 1251    Order Status: Completed Specimen: Stool Updated: 12/05/22 0217     C. diff Antigen Positive     C difficile Toxins A+B, EIA Negative     Comment: Testing not recommended for children <24 months old.       Respiratory Infection Panel (PCR), Nasopharyngeal [[id number]] Collected: 12/03/22 1644    Order Status: Completed Specimen: Nasopharyngeal Swab Updated: 12/03/22 1841     Respiratory Infection Panel Source NP Swab     Adenovirus Not Detected     Coronavirus 229E, Common Cold Virus Not Detected     Coronavirus HKU1, Common Cold Virus Not Detected     Coronavirus NL63, Common Cold Virus Not Detected     Coronavirus OC43, Common Cold Virus Not Detected     Comment: The Coronavirus strains detected in this test cause the common cold.  These strains are not the COVID-19 (novel Coronavirus)strain   associated with the respiratory disease outbreak.          SARS-CoV2 (COVID-19) Qualitative PCR Not Detected     Human Metapneumovirus Not Detected     Human Rhinovirus/Enterovirus Not Detected     Influenza A (subtypes H1, H1-2009,H3) Not Detected     Influenza B Not Detected     Parainfluenza Virus 1 Not Detected     Parainfluenza Virus 2 Not Detected     Parainfluenza Virus 3 Not Detected     Parainfluenza Virus 4 Not Detected     Respiratory Syncytial Virus Not Detected     Bordetella Parapertussis (RF1129) Not Detected     Bordetella pertussis (ptxP) Not Detected     Chlamydia pneumoniae Not Detected     Mycoplasma pneumoniae Not Detected    Narrative:      For all other respiratory sources, order [id number] -  Respiratory Viral Panel by PCR    Urine culture [[id number]] Collected: 12/01/22 2320    Order Status: Canceled Specimen: Urine               Antimicrobials:  Antibiotics (From admission, onward)      Start     Stop Route Frequency Ordered     12/06/22 0900  mupirocin 2 % ointment         12/11 0859 Nasl 2 times daily 12/05/22 2045 12/05/22 0900  sulfamethoxazole-trimethoprim 400-80mg per tablet 1 tablet         -- Oral Every Mon, Wed, Fri 12/02/22 0938    12/04/22 2200  piperacillin-tazobactam 4.5 g in sodium chloride 0.9% 100 mL IVPB (ready to mix system)         -- IV Every 8 hours (non-standard times) 12/04/22 2058          Antifungals (From admission, onward)      Start     Stop Route Frequency Ordered    12/04/22 2215  micafungin 100 mg in sodium chloride 0.9 % 100 mL IVPB (ready to mix system)         -- IV Every 24 hours (non-standard times) 12/04/22 2100          Antivirals (From admission, onward)          Stop Route Frequency     ganciclovir (CYTOVENE) injection         -- IV Every 24 hours (non-standard times)              Other Results:  Radiology Results:  US Transplant Kidney With Doppler    Result Date: 12/4/2022  EXAMINATION: US TRANSPLANT KIDNEY WITH DOPPLER CLINICAL HISTORY: s/p SPK. increase in Cr, severe abdominal pain; TECHNIQUE: Transplant renal ultrasound of the right lower quadrant with color flow doppler and duplex analysis. COMPARISON: Renal transplant ultrasound 12/03/2022.  12/01/2022. FINDINGS: Status post right lower quadrant renal transplant on 11/03/2022.  The renal transplant measures 12.5 cm and demonstrates no focal parenchymal abnormality. Minimal transplant hydronephrosis.  No peritransplant fluid. Renal arterial resistive indices are as follows: Interlobar 0.77 (previously 0.81), segmental Up 0.75 (previously 0.72), segmental Mid 0.77 (previously 0.78), segmental Low 0.76 (previously 0.80).  There is no evidence of a tardus parvus waveform. The maximum velocity in the main renal artery is 236 (previously 187) cm/sec.  Maximum velocity in the main renal vein is 139 (previously 115) centimeters/second.  RA/iliac ratio 0.97 The renal transplant venous system is unremarkable. Bladder is decompressed around Moncada  catheter.     Minimal transplant hydronephrosis. Mild interval increased main renal artery and main renal vein peak systolic velocity compared to prior.  Recommend attention on follow-up. Intraparenchymal resistive indices are improved compared to multiple prior exams. Electronically signed by resident: Laura Sifuentes Date:    12/04/2022 Time:    15:10 Electronically signed by: Zulma Anaya MD Date:    12/04/2022 Time:    16:05    US Transplant Kidney With Doppler    Result Date: 12/3/2022  EXAMINATION: US TRANSPLANT KIDNEY WITH DOPPLER CLINICAL HISTORY: s/p spk; f/u mild-moderate hydro; TECHNIQUE: Transplant renal ultrasound of the right lower quadrant with color flow doppler and duplex analysis. COMPARISON: Transplant renal ultrasound 12/01/2022.  CT abdomen pelvis 12/01/2022. FINDINGS: Status post right lower quadrant renal transplant on 11/03/2022.  The renal transplant measures 12.2 cm and demonstrates no focal parenchymal abnormality. Mild transplant hydronephrosis, decreased compared to prior exam..  No peritransplant fluid. Renal arterial resistive indices are as follows: Interlobar 0.81 (previously 0.80), segmental Sup 0.72 (previously 0.83), segmental Mid previously 0.78 (0.77), segmental Low previously 0.80 (0.80).  There is no evidence of a tardus parvus waveform. The maximum velocity in the main renal artery is 98.1 (previously 166) cm/sec. Renal veins are patent.  There is a slightly elevated velocity at the renal vein anastomosis measuring 115 centimeters/second (previously 190 centimeters/second). The renal transplant venous system is unremarkable.     1. Interval decrease in transplant hydronephrosis.  Interval improvement of main renal artery velocity. 2. Intraparenchymal resistive indices are slightly elevated but stable compared to prior exam. 3. Improvement in renal vein anastomosis velocity which remains somewhat elevated.  Continued follow-up is recommended. Electronically signed by  resident: Laura Sifuentes Date:    12/03/2022 Time:    09:34 Electronically signed by: Zulma Anaya MD Date:    12/03/2022 Time:    10:36    US Transplant Kidney With Doppler    Result Date: 12/1/2022  EXAMINATION: US TRANSPLANT KIDNEY WITH DOPPLER CLINICAL HISTORY: GEORGE in kidney transplant; TECHNIQUE: Real time gray scale and doppler ultrasound was performed over the patient's renal allograft. COMPARISON: Same day CT abdomen pelvis Ultrasound transplant kidney 11/25/2022 FINDINGS: Renal transplant 11/02/2022. Renal allograft in the right lower quadrant.  The allograft measures 12.2 cm. Normal perfusion. Mild to moderate hydronephrosis, new from prior.  Air noted within the collecting system and bladder similar to CT.  There is no ureteral stent. Minimal Peritransplant free fluid. Vasculature: Resistive indices: Interlobar 0.80 (previously 0.84) Segmental upper pole 0.83 (previously 0.90) Segmental mid pole 0.77 (previously 0.88) Segmental lower pole 0.80 (previously 0.80) Main renal artery peak systolic velocity: 166 cm/sec (previously 156 cm/sec) with normal waveform. Renal artery/iliac ratio: 0.86. The main renal vein is patent.  Increased velocity at the anastomosis measuring up to 190 cm/sec.     1. Interval removal of ureteral stent, with mild to moderate hydronephrosis new from prior.  Air noted within the collecting system and bladder similar to same day CT and presumably related to ureteral stent removal. 2. Mild improvement in the renal arterial resistive indices; although, they remain slightly elevated, a nonspecific finding which can be seen with drug toxicity or rejection. 3. Increased velocity at the main renal vein anastomosis.  Attention on follow-up. Electronically signed by resident: Rodríguez Tam Date:    12/01/2022 Time:    20:26 Electronically signed by: Adrian Knox MD Date:    12/01/2022 Time:    20:38    US Transplant Kidney With Doppler    Result Date: 11/25/2022  EXAMINATION: US  TRANSPLANT KIDNEY WITH DOPPLER CLINICAL HISTORY: severe GEORGE s/p kidney pancreas transplant. also with fevers; TECHNIQUE: Real time gray scale and doppler ultrasound was performed over the patient's renal allograft. COMPARISON: U/S transplant kidney with Doppler 11/05/2022 FINDINGS: Patient is status post renal allograft in the right lower quadrant on 11/02/2022.  The allograft measures 11.7 cm, previously 10.6 cm.  There is new diffuse parenchymal slightly more hypoechoic appearance which may reflect changes of edema.  Normal perfusion. No hydronephrosis.  Ureteral stent is visualized. No fluid collections. Vasculature: Resistive indices of the arteries: Interlobar: 0.84, previously 0.69 Upper segment: 0.90, previously 0.74 Mid segment: 0.88, previously 0.75 Lower segment: 0.80, previously 0.70 Main renal artery peak systolic velocity: 107cm/sec with normal waveform, previously 287cm/sec. Renal artery/iliac ratio: 0.48. The main renal vein is patent. Bladder contains some echogenic debris.     Renal allograft is slightly larger in size with new hypoechogenic appearance and interval increase in segmental renal arterial resistive indices.  Differential considerations include allograft edema, drug toxicity, and rejection. Debris within the bladder.  Recommend correlation with urinalysis. This report was flagged in Epic as abnormal. Electronically signed by resident: Hemant Mendoza Date:    11/25/2022 Time:    17:41 Electronically signed by: Adrian Knox MD Date:    11/25/2022 Time:    18:23    US Transplant Kidney With Doppler    Result Date: 11/5/2022  EXAMINATION: US TRANSPLANT KIDNEY WITH DOPPLER CLINICAL HISTORY: cessation of urine post kidney/pancreas transplant; TECHNIQUE: Real time gray scale and doppler ultrasound was performed over the patient's renal allograft. COMPARISON: Kidney transplant Doppler ultrasound 11/04/2022. FINDINGS: Renal allograft in the right lower quadrant.  The allograft measures 10.8 cm,  previously 10.6 cm.  Normal perfusion. No hydronephrosis.  There is a ureteral stent. Similar size of peritransplant fluid collection measuring 1.4 x 4.1 x 2.5 cm (previously 1.8 x 3.0 x 3.2 cm). Vasculature: Resistive indices: Intralobar: 0.69, previously 0.76. Upper segmental: 0.74, previously 0.74. Mid segmental: 0.75, previously 0.74. Lower segmental: 0.70, previously 0.69. Main renal artery peak systolic velocity: 287cm/sec with normal waveform, previously 384 centimeter/second.. Renal artery/iliac ratio: 2.1. The main renal vein is patent. Bladder is decompressed around a Moncada catheter and poorly visualized.     1. Interval improvement of elevated peak systolic velocity of the main renal artery. 2. Mild decrease in the resistive index in the intralobar renal artery.  Remainder of resistive indices are similar to ultrasound from 11/04/2022. 3. Similar size of peritransplant fluid collection. Electronically signed by resident: Palmira Lewis Date:    11/05/2022 Time:    18:11 Electronically signed by: Adrian Knox MD Date:    11/05/2022 Time:    18:46    X-Ray Chest AP Portable    Result Date: 11/25/2022  EXAMINATION: XR CHEST AP PORTABLE CLINICAL HISTORY: Sepsis; TECHNIQUE: Single frontal view of the chest was performed. COMPARISON: Chest radiograph 11/03/2022 and CT thorax 09/14/2022 FINDINGS: Patient is slightly rotated.  No detrimental change.The lungs are well expanded and clear, with normal appearance of pulmonary vasculature and no pleural effusion or pneumothorax. The cardiac silhouette is normal in size. The hilar and mediastinal contours are unremarkable. Bones are intact.     No detrimental change or radiographic acute intrathoracic process seen.  Specifically, no consolidation. Electronically signed by: Adrian Knox MD Date:    11/25/2022 Time:    12:10    CT Abdomen Pelvis  Without Contrast    Result Date: 12/4/2022  EXAMINATION: CT ABDOMEN PELVIS WITHOUT CONTRAST CLINICAL HISTORY: Abdominal pain,  acute, nonlocalized; TECHNIQUE: Low dose axial images, sagittal and coronal reformations were obtained from the lung bases to the pubic symphysis without intravenous contrast.   Oral contrast was not administered. COMPARISON: Ultrasound transplant kidney 12/04/2022, CT abdomen pelvis 12/01/2022 FINDINGS: LUNG BASES & MEDIASTINUM (limited):  Base of heart is normal in size.  No visualized mediastinal lymphadenopathy.  No pericardial effusion.  No pleural effusion.  No focal consolidation. HEPATOBILIARY: No focal hepatic lesions.No biliary ductal dilatation.Normal gallbladder. SPLEEN:  No splenomegaly. PANCREAS: Postsurgical change of pancreatic transplant.  Native pancreas is atrophic.  No peripancreatic fat stranding. ADRENALS:  No adrenal nodules. KIDNEYS/URETERS: Postsurgical change of renal transplant with allograft in the midline to left lower quadrant, stable in configuration.  Native kidneys are atrophic.  Bilateral high-density foci in the native kidneys favored to represent vascular calcifications, with nephroliths felt less likely.  No native hydronephrosis.  Minimal transplant hydronephrosis better characterized on ultrasound 12/04/2022 and slightly increased since comparison CT 12/01/2022, allowing for lack of IV contrast.  No identifiable obstructive etiology.  Interval decrease in transplant collecting system air.  No solid mass lesions definitively seen.  Visualized ureters are unremarkable. PELVIC ORGANS/BLADDER:  Bladder is moderately distended with Moncada catheter in place.  Stable to slightly decreased multiple foci of intravesicular air surrounding the Moncada catheter bulb and along the anterior wall.  Uterus is unremarkable.  Similar to prior, there are bilateral adnexal hypodense structures measuring up to 2.8 cm with attenuation characteristics consistent with complex/hemorrhagic cysts. PERITONEUM / RETROPERITONEUM:  No free air.  Stable to slight interval increase in mesenteric edema.  New trace  free fluid and stranding along the right pericolic gutter.  Interval decreased amount of perihepatic free fluid. LYMPH NODES: Multiple prominent mesenteric and para-aortic lymph nodes with none reaching pathologic short-axis size criteria. VESSELS:  No aneurysm.  Unchanged extensive calcifications of the mesenteric vessels, bilateral common femoral vessels and major visualized branches. GI TRACT: Small hiatal hernia.  Stomach is otherwise unremarkable.  Duodenum is fluid-filled and grossly unremarkable.  Proximal small bowel is normal in caliber without evidence of obstruction.  Multiple air and fluid-filled loops of distal small bowel without wall thickening or pathologic distention.  Interval development of short segment circumferential wall thickening of the fluid-filled cecum and proximal colon (e.g., axial series 2, image 74) with surrounding stranding and edema.  No evidence of obstruction.  Remainder large bowel is largely decompressed and unremarkable.  Appendix is not definitively visualized.  No pneumatosis or portal venous gas. BONES AND SOFT TISSUES: Stable postsurgical appearance of midline laparotomy with induration.  New mild diffuse anasarca.  Remainder of soft tissues are unremarkable.  No acute fractures.  No aggressive osseous lesions.     1. Stable postsurgical change of kidney pancreas transplant. 2. Interval development of short segment circumferential wall thickening of the fluid-filled cecum and proximal colon with surrounding stranding and edema.  No evidence of obstruction.  Findings are nonspecific and may represent infectious colitis, typhlitis or nonspecific colopathy.  Clinical correlation is recommended. 3. Interval development of minimal transplant hydronephrosis better characterized on ultrasound 12/04/2022 and slightly increased since comparison CT 12/01/2022.  No identifiable obstructive etiology.  Interval decrease in transplant collecting system air. 4. New mild diffuse anasarca.   Recommend correlation for volume status and 3rd spacing etiologies. 5. Stable to slightly decreased multiple foci of intravesicular air. 6. Small hiatal hernia. 7. Extensive atherosclerotic disease of the mesenteric and proximal lower extremity vasculature. 8. Additional findings as above. Electronically signed by resident: Kristopher iLmon Date:    12/04/2022 Time:    18:05 Electronically signed by: Adrian Knox MD Date:    12/04/2022 Time:    19:32    CT Abdomen Pelvis  Without Contrast    Result Date: 12/1/2022  EXAMINATION: CT ABDOMEN PELVIS WITHOUT CONTRAST CLINICAL HISTORY: s/p kidney/pancreas transplant with nausea/vomiting; TECHNIQUE: Axial images of the abdomen and pelvis were acquired without adminstration of contrast.    Coronal and sagittal reconstructions were also obtained COMPARISON: Ultrasound 01/25/2022.  CTA 09/14/2022 FINDINGS: Evaluation of intra-abdominal organs is limited due to lack contrast. HEART: Normal in size. No pericardial effusion. LUNG BASES: Visualized portions of the lungs are clear. LIVER: Normal in size and contour.  No focal hepatic lesion. GALLBLADDER: No calcified gallstones. BILE DUCTS: No evidence of dilated ducts. PANCREAS: Postoperative changes of pancreatic transplant.  Native pancreas is atrophic.  No peripancreatic fat stranding or. SPLEEN: Unremarkable. ADRENALS: Unremarkable. KIDNEYS/URETERS: Native kidneys atrophic.  Bilateral calcifications favored to represent vascular calcification or less likely stones.  No hydronephrosis. Postoperative changes of renal transplant.  There is collecting system air, could be related to interval removal of stent.  No evidence hydronephrosis nephrolithiasis. BLADDER: No evidence of wall thickening.  Intraluminal air, likely related to recent stent removal. REPRODUCTIVE: Bilateral adnexal hypodense structures with average 30-35 Hounsfield units measuring up to 2.4 cm, likely small complex/hemorrhagic cysts. STOMACH/DUODENUM: Small  sized hiatal hernia. BOWEL/MESENTERY: Small bowel is normal in caliber with no evidence of obstruction. No evidence of inflammation or wall thickening.  Colon demonstrates no focal wall thickening.  Appendix is unremarkable. Mild diffuse mesenteric edema. PERITONEUM: No intraperitoneal free air or significant fluid LYMPH NODES: No retroperitoneal lymphadenopathy. VESSELS: No aneurysm. Extensive calcific atherosclerosis of the mesenteric vessels and common femoral and its branches. ABDOMINAL WALL:  Midline incision.  There is soft tissue thickening along the incision. BONES: No acute fracture. No suspicious osseous lesions.     Evaluation of intra-abdominal structures is limited due to lack of contrast. 1. Postoperative changes of kidney and pancreatic transplant. 2. There is air within the renal allograft and bladder, likely related to interval removal of ureteric stent. 3. Small-sized hiatal hernia. 4. Extensive atherosclerotic disease of the mesenteric vessel and proximal lower extremities vessels. 5. Additional findings as above. Electronically signed by resident: Mars Ball Date:    12/01/2022 Time:    19:03 Electronically signed by: Adrian Knox MD Date:    12/01/2022 Time:    20:05    US Pancreas Transplant Post    Result Date: 11/25/2022  EXAMINATION: US DOPPLER PANCREAS TRANSPLANT POST (XPD) CLINICAL HISTORY: fevers s/p pancreas ultrasound; TECHNIQUE: Grayscale, color flow, and spectral analysis was used to evaluate the pancreas allograft using transabdominal ultrasound technique. COMPARISON: U/S doppler pancreas transplant 11/07/2022 FINDINGS: Pancreatic allograft is demonstrates a new heterogeneous hypoechoic area.  This most likely represents shadowing from overlying bowel.  Otherwise, pancreatic parenchyma appears similar to prior ultrasound.  Pancreatic duct measures 4 mm, previously 5 mm.  There are no peripancreatic fluid collections. Velocities:in cm/s Conduit:198, previously 245 Splenic  artery:91, previously 36 Splenic vein:14, previously 25 Portal vein:73, previously 22 Superior mesenteric vein:45, previously 32 Iliac artery:177, previously 252 Superior mesenteric artery: 65, previously 32 Resistive indices: Head: 0.71 (previously 0.67) Body: 0.70 (previously 0.56) Tail: 0.65 (previously 0.56) While the resistive indices of the transplant pancreatic head, body and tail have overall increased from prior, current values are still within normal limits.     Satisfactory Doppler evaluation of pancreatic allograft. New area of hypoechogenicity, likely representing shadowing from bowel.  Attention on follow-up. Electronically signed by resident: Hemant Mendoza Date:    11/25/2022 Time:    17:27 Electronically signed by: Adrian Knox MD Date:    11/25/2022 Time:    17:54    US Pancreas Transplant Post    Result Date: 11/7/2022  EXAMINATION: US DOPPLER PANCREAS TRANSPLANT POST (XPD) CLINICAL HISTORY: s/p kidney/panc txp. panc enzymes mildly elevated; TECHNIQUE: Grayscale, color flow, and spectral analysis was used to evaluate the pancreas allograft using transabdominal ultrasound technique. COMPARISON: Pancreas transplant ultrasound: 11/03/2022. FINDINGS: Pancreatic transplant on 11/03/2022. Pancreatic allograft is homogeneous in echogenicity.  There are no peripancreatic fluid collections. VELOCITIES: Conduit:245 (previously 310) cm/s Splenic artery:36 (previously 42) cm/s SMA of Y graft: 32 (previously 36) cm/s. Splenic vein:25 (previously 31) cm/s Portal vein:22 (previously 48) cm/s Superior mesenteric vein:32 (previously 79) cm/s Iliac artery:252 (previously 253) cm/s RESISTIVE INDICES: Head: 0.67 (previously 0.50). Body: 0.56 (previously 0.59). Tail: 0.56 (previously 0.54).     Satisfactory Doppler evaluation the pancreatic allograft, as above. Electronically signed by resident: Jimmie Fuentes Date:    11/07/2022 Time:    13:29 Electronically signed by: Jim Cedeño MD Date:    11/07/2022  Time:    14:29      Current Medications:     Infusions:   sodium chloride 0.9% 75 mL/hr at 12/06/22 0700        Scheduled:   aspirin  81 mg Oral Daily    atorvastatin  40 mg Oral Daily    ganciclovir (CYTOVENE) IVPB  0.625 mg/kg Intravenous Q24H    heparin (porcine)  5,000 Units Subcutaneous Q8H    micafungin (MYCAMINE) IVPB  100 mg Intravenous Q24H    mupirocin   Nasal BID    piperacillin-tazobactam (ZOSYN) IVPB  4.5 g Intravenous Q8H    predniSONE  5 mg Oral Daily    sulfamethoxazole-trimethoprim 400-80mg  1 tablet Oral Every Mon, Wed, Fri    tacrolimus  2 mg Sublingual BID    venlafaxine  37.5 mg Oral Daily        PRN:  sodium chloride 0.9%, acetaminophen, dextrose 10%, dextrose 10%, glucagon (human recombinant), insulin aspart U-100, morphine, ondansetron, promethazine (PHENERGAN) IVPB, sodium chloride 0.9%, traMADoL

## 2022-12-06 NOTE — HPI
27 years old female patient with with ESRD secondary to diabetic nephropathy and HTN, now s/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 She presented to hospital with nausea and weakness, and is being treated for presumed colitis. She was febrile on admission, and had leucocytosis. She was started on broad spectrum antibiotics. She had recent UTI and was treated for it. During hospitalzation, she developed diarrhea, and had a CT CAP which showed possible colitis.   She had a drop in her hemoglobin and required 1U PRBC for hemoglobin 6.5 g/dl with good response. Work-up for anemia has lead to finding ferritin level of about [id number]. Hematology is consulted for hyperferricemia in the setting of fevers and possible HLH     On interview today, she was pleasant and in no distress. She reported feeling nauseous on admission and developed diarrhea while in the hospital. She has not required blood transfusions after her transplant. Her fever curve improved with antibiotics.

## 2022-12-06 NOTE — PLAN OF CARE
Plan of care reviewed on am rounds with patient and mother.Afrebrile, vss tele sr-st.wbc improving 13  h/h stable Cr trending down 3.1 K 2.8 ( in process of being replaced with IV). Mg corrected after replaced yesterday. IV therapy continued for UTI with ID following.Advance diet to regular, ate 50% of late lunch. Denies any c/o n/v or abd discomfort. Moncada patent with good urine output, continues with episodes of liquid stool incontinence with GI consulted.Up ambulatory in room with standby assist.Refusing Teds/ SQ heparin for DVT prophylaxis although instructed on indication. Supportive mother at , emotional support provided too both.

## 2022-12-06 NOTE — ASSESSMENT & PLAN NOTE
- Blood cx x 2, ngtd  - UA showed 11 WBCs, moderate bacteria, and 8 hyaline casts  - Urine cx with E. Faecium & Diphtheroids  - ON pt with Tmax 103 and HR in 140's  - LA 0.6  - D/w ID, changed cefepime to zosyn, added luis carlos, and stopped dapto  - Repeat urine and blood cxs in process  - CT AP noted interval development of short segment circumferential wall thickening of the fluid-filled cecum and proximal colon with surrounding stranding and edema, perhaps infectious colitis, typhlitis or nonspecific colopathy  - C diff PCR negative  - CMV negative  - GI pathogens panel pending  - Plan for GI consult due to fevers, diarrhea, and possible colitis on CT scan

## 2022-12-06 NOTE — ASSESSMENT & PLAN NOTE
27 years old female patient s/p kidney and pancreas transplant 11/03/2022 who is currently admitted for sepsis likely from colitis. She has chronic anemia which worsened this admission requiring a unit of PRBC with good response.     Work-up was significant of elevated ferritin up to [id number]. She has normal/high haptoglobin, and slightly elevated LDH.     Plan:   - hyperferritinemia likely in the setting of sepsis and inflammation. Though, a level of 92[address] is unusually high to be explained just by inflammation.   - HLH is unlikely as well giving the overall picture. She has no cytopenia other than anemia which is chronic. She has normal spleen size. Can check Fibrinogen, and triglycerides to calculate H-score.   - Continue antibiotics coverage for colitis and monitor fever curve.

## 2022-12-06 NOTE — ASSESSMENT & PLAN NOTE
27 year old female with ESRD secondary to diabetic nephropathy and HTN, now s/p SPK 11/3/22 (Thymo induction, CMV D-/R+) on Tacrolimus admitted with sepsis, started on broad spectrum abx and subsequently developed multiple episodes of non-blood diarrhea. Blood cultures, C. Diff, and CMV PCR all negative. CTAP with which showed circumferential wall thickening of the cecum and proximal colon likely from colitis,typhlitis. ID following, recommended zosyn and micafungin. At this time, diarrhea does not appear to be worsening on current abx regimen.    Our recommendations are as follows:  - Follow up GI pathogen panel  - Continue broad spectrum abx per ID recommendations  - No indication for endoscopy at this time. If symptoms do not improve or worsen, will consider.   - Would avoid use of anti-diarrheal agents as infectious etiology of diarrhea has not been ruled out

## 2022-12-06 NOTE — ASSESSMENT & PLAN NOTE
- Cr 3.3 from 3.5  - DSA's in process  - Tentative plan for kidney biopsy Wednesday 12/7  - Had ureteral stent removed 11/30/22  - Kidney US showed mild-mod hydro, dc placed  - Repeat US 12/3 with improved hydro and creatinine improved as well  - Dc upsized by urology due CT showing enlarged transplant kidney and distended bladder despite dc in adequate position.   - If creatinine remains elevated, plan for kidney biopsy Thursday 12/8/22

## 2022-12-06 NOTE — SUBJECTIVE & OBJECTIVE
Oncology Treatment Plan:   [No matching plan found]    Medications:  Continuous Infusions:   sodium chloride 0.9% 75 mL/hr at 12/06/22 1327     Scheduled Meds:   aspirin  81 mg Oral Daily    atorvastatin  40 mg Oral Daily    ganciclovir (CYTOVENE) IVPB  0.625 mg/kg Intravenous Q24H    heparin (porcine)  5,000 Units Subcutaneous Q8H    micafungin (MYCAMINE) IVPB  100 mg Intravenous Q24H    mupirocin   Nasal BID    piperacillin-tazobactam (ZOSYN) IVPB  4.5 g Intravenous Q8H    potassium chloride  10 mEq Intravenous Q1H    predniSONE  5 mg Oral Daily    sulfamethoxazole-trimethoprim 400-80mg  1 tablet Oral Every Mon, Wed, Fri    tacrolimus  2 mg Sublingual BID    venlafaxine  37.5 mg Oral Daily     PRN Meds:sodium chloride 0.9%, acetaminophen, dextrose 10%, dextrose 10%, dextrose 10%, dextrose 10%, glucagon (human recombinant), glucose, glucose, insulin aspart U-100, morphine, ondansetron, promethazine (PHENERGAN) IVPB, sodium chloride 0.9%, traMADoL     Review of patient's allergies indicates:  No Known Allergies     Past Medical History:   Diagnosis Date    Acute kidney injury superimposed on chronic kidney disease 4/7/2021    Anemia     Chronic hypertension with exacerbation during pregnancy in second trimester 11/6/2020    Current regimen (11/6/20):  - Carvedilol 12.5 mg BID - Nifedipine 30 mg daily Baseline CKD + proteinuria    Chronic kidney disease     Depression     Diabetes mellitus     Diabetic retinopathy     Diarrhea     Encounter for blood transfusion     End stage renal failure on dialysis     Gastroparesis     Glaucoma     Hepatomegaly 4/29/2021    Hx of psychiatric care     Hyperlipidemia     Hypertension     Nephrotic syndrome     Nephrotic syndrome 3/4/2021    Nonspecific reaction to tuberculin skin test without active tuberculosis 10/1/2021    Palpitations     Poor fetal growth affecting management of mother in second trimester 10/15/2020    Restrictive lung disease     Retinal detachment     Severe  pre-eclampsia in second trimester 11/6/2020    Type 1 diabetes mellitus with end-stage renal disease (ESRD) 2/24/2015    Followed by Dr. Mayo.  Last A1C was 13  Currently on lantus 20 units qAM and humolog 10 units with meals  - a fetal echocardiogram around 22-24 weeks - needs eye exam, podiatry exam  - needs EKG, maternal echo and fu with cardiology  - ASA 81mg, folic acid 4mg PO daily - hemoglobin A1c every 4-6 weeks -24 hour urine for protein and creatinine clearance should be performed. Patient will also need a     Past Surgical History:   Procedure Laterality Date    AV FISTULA PLACEMENT Left 04/07/2021    Procedure: CREATION, AV FISTULA;  Surgeon: Roberto Ryan MD;  Location: [facility] OR 2ND FLR;  Service: Peripheral Vascular;  Laterality: Left;    COLONOSCOPY N/A 03/16/2022    Procedure: COLONOSCOPY;  Surgeon: Tavo Kwok MD;  Location: [facility] (4TH FLR);  Service: Endoscopy;  Laterality: N/A;  Questionable history of delayed gastric emptying longstanding diabetes now on eating pre transplant workup for history of nausea vomiting which seems to have improved with dialysis also chronic diarrhea and history of anemia pre transplant workup for kidney transplant. 3    CYSTOSCOPY      ESOPHAGOGASTRODUODENOSCOPY N/A 03/16/2022    Procedure: EGD (ESOPHAGOGASTRODUODENOSCOPY);  Surgeon: Tavo Kwok MD;  Location: [facility] (4TH FLR);  Service: Endoscopy;  Laterality: N/A;  Questionable history of delayed gastric emptying longstanding diabetes now on eating pre transplant workup for history of nausea vomiting which seems to have improved with dialysis also chronic diarrhea and history of anemia pre transplant workup for    FISTULOGRAM N/A 08/11/2021    Procedure: Fistulogram;  Surgeon: Roberto Ryan MD;  Location: [facility] CATH LAB;  Service: Cardiology;  Laterality: N/A;    KIDNEY TRANSPLANT Right 11/02/2022    Procedure: TRANSPLANT, KIDNEY;  Surgeon: Kumar Rodriges Jr., MD;  Location: [facility]  OR 2ND FLR;  Service: Transplant;  Laterality: Right;    LASER PHOTOCOAGULATION OF RETINA Right 05/31/2022    Procedure: PHOTOCOAGULATION, RETINA, USING LASER;  Surgeon: Maximilian Montaño MD;  Location: [facility] OR 1ST FLR;  Service: Ophthalmology;  Laterality: Right;    PERCUTANEOUS TRANSLUMINAL ANGIOPLASTY OF ARTERIOVENOUS FISTULA N/A 08/11/2021    Procedure: PTA, AV FISTULA;  Surgeon: Roberto Ryan MD;  Location: [facility] CATH LAB;  Service: Cardiology;  Laterality: N/A;    REMOVAL IMPLANT, POSTERIOR SEGMENT, INTRAOCULAR Right 02/01/2022    Procedure: REMOVAL IMPLANT, POSTERIOR SEGMENT, INTRAOCULAR;  Surgeon: Maximilian Montaño MD;  [facility];  Service: Ophthalmology;  Laterality: Right;    REPAIR OF RETINAL DETACHMENT WITH VITRECTOMY Right 01/25/2022    Procedure: REPAIR, RETINAL DETACHMENT, WITH VITRECTOMY, MEMBRANE PEEL, LASER, INJECTION OF GAS VS OIL;  Surgeon: Maximilian Montaño MD;  [facility];  Service: Ophthalmology;  Laterality: Right;    REVISION OF ARTERIOVENOUS FISTULA Left 12/10/2021    Procedure: REVISION, AV FISTULA with BVT;  Surgeon: Roberto Ryan MD;  [facility];  Service: Peripheral Vascular;  Laterality: Left;    TRANSPLANTATION OF PANCREAS N/A 11/02/2022    Procedure: TRANSPLANT, PANCREAS;  Surgeon: Kumar Rodriges Jr., MD;  Location: [facility] OR 2ND FLR;  Service: Transplant;  Laterality: N/A;    VITRECTOMY BY PARS PLANA APPROACH Right 02/01/2022    Procedure: VITRECTOMY, PARS PLANA APPROACH;  Surgeon: Maximilian Montaño MD;  [facility];  Service: Ophthalmology;  Laterality: Right;    VITRECTOMY BY PARS PLANA APPROACH Right 05/31/2022    Procedure: VITRECTOMY, PARS PLANA APPROACH;  Surgeon: Maximilian Montaño MD;  [facility];  Service: Ophthalmology;  Laterality: Right;     Family History       Problem Relation (Age of Onset)    Diabetes Brother    Heart disease Father    Hypertension Mother           Tobacco Use    Smoking status: Never    Smokeless tobacco: Never   Substance and Sexual Activity    Alcohol use: No    Drug use: No    Sexual activity: Not Currently     Partners: Male     Comment: monogamous       Review of Systems   Constitutional:  Positive for appetite change.   HENT: Negative.     Respiratory:  Negative for shortness of breath.    Gastrointestinal:  Positive for diarrhea. Negative for abdominal distention, abdominal pain, blood in stool and nausea.   Endocrine: Negative.    Genitourinary:  Negative for dysuria.   Musculoskeletal: Negative.    Neurological:  Negative for headaches.   Hematological: Negative.       Objective:     Vital Signs (Most Recent):  Temp: 97.8 °F (36.6 °C) (12/06/22 1204)  Pulse: 99 (12/06/22 1529)  Resp: 18 (12/06/22 1204)  BP: (!) 129/99 (12/06/22 1204)  SpO2: 100 % (12/06/22 1204)   Vital Signs (24h Range):  Temp:  [97.5 °F (36.4 °C)-98.2 °F (36.8 °C)] 97.8 °F (36.6 °C)  Pulse:  [] 99  Resp:  [18] 18  SpO2:  [95 %-100 %] 100 %  BP: (119-134)/(55-99) 129/99     Weight: 62.2 kg (137 lb 2 oz)  Body mass index is 23.54 kg/m².  Body surface area is 1.68 meters squared.      Intake/Output Summary (Last 24 hours) at 12/6/2022 1542  Last data filed at 12/6/2022 1340  Gross per 24 hour   Intake 3197.72 ml   Output 1480 ml   Net 1717.72 ml       Physical Exam  Constitutional:       General: She is not in acute distress.     Appearance: Normal appearance.   HENT:      Head: Normocephalic and atraumatic.   Eyes:      Pupils: Pupils are equal, round, and reactive to light.   Cardiovascular:      Rate and Rhythm: Normal rate and regular rhythm.      Heart sounds: No murmur heard.  Pulmonary:      Effort: No respiratory distress.      Breath sounds: Normal breath sounds. No wheezing.   Abdominal:      General: Abdomen is flat. Bowel sounds are normal. There is no distension.      Palpations: Abdomen is soft. There is no mass.      Tenderness: There is abdominal tenderness  (at incision site).   Musculoskeletal:         General: No swelling or deformity.   Skin:     Coloration: Skin is not jaundiced.   Neurological:      General: No focal deficit present.      Mental Status: She is alert and oriented to person, place, and time. Mental status is at baseline.       Significant Labs:   CBC:   Recent Labs   Lab 12/04/22 1814 12/05/22  0536 12/06/22  1020   WBC 17.30* 19.54* 13.76*   HGB 8.8* 8.9* 9.2*   HCT 26.0* 26.5* 27.9*    394 491*   , CMP:   Recent Labs   Lab 12/04/22  1814 12/04/22  2044 12/05/22  0536 12/06/22  1020     139 141 140 140   K 2.6*  2.6* 3.2* 3.2* 2.8*   CL 89*  89* 108 104 106   CO2 40*  40* 21* 21* 21*   *  838* 172* 120* 107   BUN 17  17 19 20 24*   CREATININE 3.2*  3.2* 3.5* 3.3* 3.0*   CALCIUM 6.9*  6.9* 8.7 8.8 9.0   PROT 5.1*  --   --   --    ALBUMIN 2.0*  2.0* 2.5* 2.3* 2.2*   BILITOT 0.4  --   --   --    ALKPHOS 74  --   --   --    AST 15  --   --   --    ALT 15  --   --   --    ANIONGAP 10  10 12 15 13   , Coagulation: No results for input(s): PT, INR, APTT in the last 48 hours., Haptoglobin: No results for input(s): HAPTOGLOBIN in the last 48 hours., and LDH: No results for input(s): LDHCSF, BFSOURCE in the last 48 hours.    Diagnostic Results:  I have reviewed and interpreted all pertinent imaging results/findings within the past 24 hours.

## 2022-12-06 NOTE — PROGRESS NOTES
[facility]  Kidney Transplant  Progress Note      Reason for Follow-up: Reassessment of Kidney, Pancreas Transplant - 11/3/2022  (#1) recipient and management of immunosuppression.          Subjective:   History of Present Illness:  Ms. Read is a 27 y.o.  female with ESRD secondary to diabetic nephropathy and HTN, now s/p SPK 11/3/22 (Thymo induction, CMV D-/R+). Her post-op course was complicated by urinary retention requiring Moncada replacement, now resolved. She was recently admitted to the hospital and discharged 11/29/22 after presenting with nausea/vomiting, GEORGE, and had fever. Infectious work up performed. Urine cx had +multiple organisms none in predominance. Kidney US with edema/possible pyelo, ID consulted. She was initially treated with broad spectrum antibiotics. She was discharged on PO cipro x 7 days at [place]. She now presents to the ED with complaints of ongoing nausea/vomiting, decreased appetite, intermittent abdominal pain. She had ureteral stent removed outpatient yesterday 11/30/22. She was in infusion center today to give IVF but had episode of vomiting so she was sent to the ED. She denies dysuria/urgency. She denies history of gastroparesis (has had gastric emptying study in 2020 that was normal). In ED, she is noted to be tachycardic in the 130s, blood pressure 90s, along with low grade fever 100.4. EKG shows sinus tachycardia. Creatinine remains elevated above baseline. She is receiving 1L LR bolus in ED. Plan for repeat infectious work up, IVF hydration, IV antibiotics, kidney US, and CT A/P. Will also check CMV PCR.    Ms. Read is a 27 y.o. year old female who is status post Kidney, Pancreas Transplant - 11/3/2022  (#1).    Her maintenance immunosuppression consists of:   Immunosuppressants (From admission, onward)      Start     Stop Route Frequency Ordered    12/05/22 1130  tacrolimus (PROGRAF) capsule (SUBLINGUAL) 2 mg         -- SL 2 times daily  12/05/22 1029            Interval history: No acute events overnight. Patient doing okay but is having diarrhea. Fever curve improved. Stopped dapto per ID recs. Remains on Zosyn and Melanie. Pancreas US reviewed by surgeon, overall unremarkable. Given fevers and diarrhea and CT results of possible colitis, will consult GI. GI pathogen panel is pending. Creatinine slightly improved. Likely plan for kidney biopsy on Thursday 12/8/22 if remains elevated. Will continue to monitor.       Past Medical, Surgical, Family, and Social History:   Unchanged from H&P.    Scheduled Meds:   aspirin  81 mg Oral Daily    atorvastatin  40 mg Oral Daily    ganciclovir (CYTOVENE) IVPB  0.625 mg/kg Intravenous Q24H    heparin (porcine)  5,000 Units Subcutaneous Q8H    micafungin (MYCAMINE) IVPB  100 mg Intravenous Q24H    mupirocin   Nasal BID    piperacillin-tazobactam (ZOSYN) IVPB  4.5 g Intravenous Q8H    potassium chloride  40 mEq Oral Once    predniSONE  5 mg Oral Daily    sulfamethoxazole-trimethoprim 400-80mg  1 tablet Oral Every Mon, Wed, Fri    tacrolimus  2 mg Sublingual BID    venlafaxine  37.5 mg Oral Daily     Continuous Infusions:   sodium chloride 0.9% 75 mL/hr at 12/06/22 0700     PRN Meds:sodium chloride 0.9%, acetaminophen, dextrose 10%, dextrose 10%, dextrose 10%, dextrose 10%, glucagon (human recombinant), glucose, glucose, insulin aspart U-100, morphine, ondansetron, promethazine (PHENERGAN) IVPB, sodium chloride 0.9%, traMADoL    Intake/Output - Last 3 Shifts         12/04 0700 12/05 0659 12/05 0700 12/06 0659 12/06 0700 12/07 0659    P.O.  505     I.V. (mL/kg) 2070.4 (33.3) 1923.5 (30.9) 22.7 (0.4)    Blood       IV Piggyback 144.7 841.6 100    Total Intake(mL/kg) 2215 (35.6) 3270.1 (52.6) 122.7 (2)    Urine (mL/kg/hr) 3675 (2.5) 1330 (0.9) 150 (0.5)    Emesis/NG output 150      Stool 0 0 0    Total Output 3825 1330 150    Net -1610 +1940.1 -27.3           Stool Occurrence 1 x 4 x 1 x    Emesis  "Occurrence 1 x               Review of Systems   Constitutional:  Positive for activity change, appetite change (decreased appetite), fatigue and fever. Negative for chills.   HENT: Negative.     Eyes: Negative.    Respiratory:  Negative for shortness of breath.    Cardiovascular:  Negative for chest pain and leg swelling.   Gastrointestinal:  Positive for nausea. Negative for abdominal distention, constipation, diarrhea and vomiting.   Endocrine: Negative.    Genitourinary:  Negative for dysuria, hematuria and urgency.   Musculoskeletal: Negative.    Skin:  Positive for wound.   Allergic/Immunologic: Positive for immunocompromised state.   Neurological:  Positive for weakness. Negative for dizziness and light-headedness.   Hematological: Negative.    Psychiatric/Behavioral:  Negative for agitation and confusion. The patient is not nervous/anxious.     Objective:     Vital Signs (Most Recent):  Temp: 97.8 °F (36.6 °C) (12/06/22 1204)  Pulse: 106 (12/06/22 1204)  Resp: 18 (12/06/22 1204)  BP: (!) 129/99 (12/06/22 1204)  SpO2: 100 % (12/06/22 1204)   Vital Signs (24h Range):  Temp:  [97.5 °F (36.4 °C)-98.7 °F (37.1 °C)] 97.8 °F (36.6 °C)  Pulse:  [] 106  Resp:  [18] 18  SpO2:  [94 %-100 %] 100 %  BP: (103-134)/(49-99) 129/99     Weight: 62.2 kg (137 lb 2 oz)  Height: 5' 4" (162.6 cm)  Body mass index is 23.54 kg/m².    Physical Exam  Vitals and nursing note reviewed.   Constitutional:       General: She is not in acute distress.     Appearance: She is ill-appearing.   Cardiovascular:      Rate and Rhythm: Regular rhythm. Tachycardia present.      Heart sounds: No murmur heard.    No gallop.   Pulmonary:      Effort: Pulmonary effort is normal.      Breath sounds: No wheezing or rales.   Abdominal:      General: There is no distension.      Tenderness: There is abdominal tenderness. There is no guarding or rebound.   Musculoskeletal:         General: No tenderness.      Cervical back: Normal range of motion.      " Right lower leg: No edema.      Left lower leg: No edema.   Skin:     General: Skin is warm.   Neurological:      Mental Status: She is alert and oriented to person, place, and time.      Motor: Weakness present.   Psychiatric:         Mood and Affect: Mood normal.         Behavior: Behavior normal.         Thought Content: Thought content normal.         Judgment: Judgment normal.       Laboratory:  CBC:   Recent Labs   Lab 12/04/22  1814 12/05/22  0536 12/06/22  1020   WBC 17.30* 19.54* 13.76*   RBC 2.90* 2.88* 3.08*   HGB 8.8* 8.9* 9.2*   HCT 26.0* 26.5* 27.9*    394 491*   MCV 90 92 91   MCH 30.3 30.9 29.9   MCHC 33.8 33.6 33.0       BMP:   Recent Labs   Lab 12/04/22  2044 12/05/22  0536 12/06/22  1020   * 120* 107    140 140   K 3.2* 3.2* 2.8*    104 106   CO2 21* 21* 21*   BUN 19 20 24*   CREATININE 3.5* 3.3* 3.0*   CALCIUM 8.7 8.8 9.0       Labs within the past 24 hours have been reviewed.    Diagnostic Results:  CT - Abd/Pelvic: No results found. However, due to the size of the patient record, not all encounters were searched. Please check Results Review for a complete set of results.  US - Kidney: Results for orders placed during the hospital encounter of 12/01/22    US Transplant Kidney With Doppler    Narrative  EXAMINATION:  US TRANSPLANT KIDNEY WITH DOPPLER    CLINICAL HISTORY:  s/p SPK. increase in Cr, severe abdominal pain;    TECHNIQUE:  Transplant renal ultrasound of the right lower quadrant with color flow doppler and duplex analysis.    COMPARISON:  Renal transplant ultrasound 12/03/2022.  12/01/2022.    FINDINGS:  Status post right lower quadrant renal transplant on 11/03/2022.  The renal transplant measures 12.5 cm and demonstrates no focal parenchymal abnormality. Minimal transplant hydronephrosis.  No peritransplant fluid.    Renal arterial resistive indices are as follows: Interlobar 0.77 (previously 0.81), segmental Up 0.75 (previously 0.72), segmental Mid 0.77  (previously 0.78), segmental Low 0.76 (previously 0.80).  There is no evidence of a tardus parvus waveform. The maximum velocity in the main renal artery is 236 (previously 187) cm/sec.  Maximum velocity in the main renal vein is 139 (previously 115) centimeters/second.  RA/iliac ratio 0.97    The renal transplant venous system is unremarkable.    Bladder is decompressed around Moncada catheter.    Impression  Minimal transplant hydronephrosis.    Mild interval increased main renal artery and main renal vein peak systolic velocity compared to prior.  Recommend attention on follow-up.    Intraparenchymal resistive indices are improved compared to multiple prior exams.    Electronically signed by resident: Laura Sifuentes  Date:    12/04/2022  Time:    15:10    Electronically signed by: Zulma Anaya MD  Date:    12/04/2022  Time:    16:05    US - Pancreas: Results for orders placed during the hospital encounter of 12/01/22    US Pancreas Transplant Post    Narrative  EXAMINATION:  US DOPPLER PANCREAS TRANSPLANT POST (XPD)    CLINICAL HISTORY:  s/p SPK; r/o pancreas infarct;    TECHNIQUE:  Grayscale, color flow, and spectral analysis was used to evaluate the pancreas allograft using transabdominal ultrasound technique.    COMPARISON:  Ultrasound from 11/25/2022    FINDINGS:  Pancreatic allograft demonstrates heterogeneous area near the pancreatic head neck, not significantly changed compared to prior examination.  There are no peripancreatic fluid collections.    Velocities:    Conduit:216 cm/s    Splenic artery:96 cm/s    Splenic vein:14 cm/s    Portal vein:109 cm/s    Superior mesenteric vein:40 cm/s    Iliac artery:204 cm/s    SMA of Y graft: 38 cm/s    Resistive indices range from 0.62-0.78.    Impression  Satisfactory Doppler evaluation of the pancreatic allograft.    Redemonstration of heterogeneous, hypoechoic area of the pancreatic head/neck with adjacent bowel.  Findings are nonspecific and continued  "follow-up recommended.      Electronically signed by: Jim Cedeño MD  Date:    2022  Time:    12:30    Assessment/Plan:     * Sepsis  - Blood cx x 2, ngtd  - UA showed 11 WBCs, moderate bacteria, and 8 hyaline casts  - Urine cx with E. Faecium & Diphtheroids  - ON pt with Tmax 103 and HR in 140's  - LA 0.6  - D/w ID, changed cefepime to zosyn, added luis carlos, and stopped dapto  - Repeat urine and blood cxs in process  - CT AP noted interval development of short segment circumferential wall thickening of the fluid-filled cecum and proximal colon with surrounding stranding and edema, perhaps infectious colitis, typhlitis or nonspecific colopathy  - C diff PCR negative  - CMV negative  - GI pathogens panel pending  - Plan for GI consult due to fevers, diarrhea, and possible colitis on CT scan    Fever  - See SIRS      Nausea & vomiting  - initial CT A/P without contrast unremarkable   - significant worsening on  with repeat KUS and CT. US benign, but CT appears to show urine in bladder despite dc  - discussed with surgery  - Dc up sized by urology     Status post pancreas transplantation  - Amylase/lipase WNL   - Pancreas US  satisfactory  - Repeat Pancreas US ordered today  to r/o infarct as source of pain/fever   - Monitor with daily labs      Pyelonephritis, acute  - With GEORGE  - UC now predominant for enterococcus, also diphtheroids present  - On zosyn, added luis carlos, and stopped dapto  - Repeat urine cx in process     Long-term use of immunosuppressant medication  - Continue prograf and steroids. Monitor prograf level daily, monitor for toxic side effects, and adjust for therapeutic dose   - Hold cellcept for GI upset.    Prophylactic immunotherapy  - See long term use of immunosuppressant medication      At risk for opportunistic infections  - Cont OI prophylaxis per protocol.         Status post -donor kidney transplantation  - s/p SPK 11/3/22 for ESRD 2/2 DMI  - See, "GEORGE"    GEORGE " (acute kidney injury)  - Cr 3.3 from 3.5  - DSA's in process  - Tentative plan for kidney biopsy Wednesday 12/7  - Had ureteral stent removed 11/30/22  - Kidney US showed mild-mod hydro, dc placed  - Repeat US 12/3 with improved hydro and creatinine improved as well  - Dc upsized by urology due CT showing enlarged transplant kidney and distended bladder despite dc in adequate position.   - If creatinine remains elevated, plan for kidney biopsy Thursday 12/8/22      Anemia due to chronic kidney disease  - H/H stable on AM labs  - currently menstruating  - given 1u prbc 12/3/22  - Parvo pending      Renovascular hypertension  - Hold antihypertensives for now as with hypotension from sepsis vs dehydration  - Assess daily and restart when appropriate          Discharge Planning: Not a candidate for discharge at this time       Daniella Vargas PA-C  Kidney Transplant  [facility]

## 2022-12-06 NOTE — HPI
Isabela Read is a 27 y.o. female for whom GI is consulted for colitis in the setting of recent kidney/pancreas transplant. She has a medical history significant for ESRD secondary to diabetic nephropathy and HTN, now s/p SPK 11/3/22 (Thymo induction, CMV D-/R+).     Hospital course:  She initially presented on 12/01 with nausea and non-bloody vomiting. On admission, she was febrile and tachycardic with a leukocytosis on labs. She was started on broad spectrum abx for suspected sepsis. On 12/04, she began to experience episodes of loose non-bloody diarrhea. She reports only having 3 episodes that day and did not have any more until the following day. She had one episode this morning but has not had any since then. She had a CTAP on 12/04, which showed circumferential wall thickening of the cecum and proximal colon likely from colitis,typhlitis. Blood cultures have been negative since admission. Additional laboratory workup with a negative CMV PCR, RIP, C. diff and GI pathogen panel in process. ID was consulted and recommended zosyn and micafungin.

## 2022-12-06 NOTE — SUBJECTIVE & OBJECTIVE
Subjective:   History of Present Illness:  Ms. Read is a 27 y.o.  female with ESRD secondary to diabetic nephropathy and HTN, now s/p SPK 11/3/22 (Thymo induction, CMV D-/R+). Her post-op course was complicated by urinary retention requiring Moncada replacement, now resolved. She was recently admitted to the hospital and discharged 11/29/22 after presenting with nausea/vomiting, GEORGE, and had fever. Infectious work up performed. Urine cx had +multiple organisms none in predominance. Kidney US with edema/possible pyelo, ID consulted. She was initially treated with broad spectrum antibiotics. She was discharged on PO cipro x 7 days at [place]. She now presents to the ED with complaints of ongoing nausea/vomiting, decreased appetite, intermittent abdominal pain. She had ureteral stent removed outpatient yesterday 11/30/22. She was in infusion center today to give IVF but had episode of vomiting so she was sent to the ED. She denies dysuria/urgency. She denies history of gastroparesis (has had gastric emptying study in 2020 that was normal). In ED, she is noted to be tachycardic in the 130s, blood pressure 90s, along with low grade fever 100.4. EKG shows sinus tachycardia. Creatinine remains elevated above baseline. She is receiving 1L LR bolus in ED. Plan for repeat infectious work up, IVF hydration, IV antibiotics, kidney US, and CT A/P. Will also check CMV PCR.    Ms. Read is a 27 y.o. year old female who is status post Kidney, Pancreas Transplant - 11/3/2022  (#1).    Her maintenance immunosuppression consists of:   Immunosuppressants (From admission, onward)      Start     Stop Route Frequency Ordered    12/05/22 1130  tacrolimus (PROGRAF) capsule (SUBLINGUAL) 2 mg         -- SL 2 times daily 12/05/22 1029            Interval history: No acute events overnight. Patient doing okay but is having diarrhea. Fever curve improved. Stopped dapto per ID recs. Remains on Zosyn and Melanie. Pancreas US reviewed by  surgeon, overall unremarkable. Given fevers and diarrhea and CT results of possible colitis, will consult GI. GI pathogen panel is pending. Creatinine slightly improved. Likely plan for kidney biopsy on Thursday 12/8/22 if remains elevated. Will continue to monitor.       Past Medical, Surgical, Family, and Social History:   Unchanged from H&P.    Scheduled Meds:   aspirin  81 mg Oral Daily    atorvastatin  40 mg Oral Daily    ganciclovir (CYTOVENE) IVPB  0.625 mg/kg Intravenous Q24H    heparin (porcine)  5,000 Units Subcutaneous Q8H    micafungin (MYCAMINE) IVPB  100 mg Intravenous Q24H    mupirocin   Nasal BID    piperacillin-tazobactam (ZOSYN) IVPB  4.5 g Intravenous Q8H    potassium chloride  40 mEq Oral Once    predniSONE  5 mg Oral Daily    sulfamethoxazole-trimethoprim 400-80mg  1 tablet Oral Every Mon, Wed, Fri    tacrolimus  2 mg Sublingual BID    venlafaxine  37.5 mg Oral Daily     Continuous Infusions:   sodium chloride 0.9% 75 mL/hr at 12/06/22 0700     PRN Meds:sodium chloride 0.9%, acetaminophen, dextrose 10%, dextrose 10%, dextrose 10%, dextrose 10%, glucagon (human recombinant), glucose, glucose, insulin aspart U-100, morphine, ondansetron, promethazine (PHENERGAN) IVPB, sodium chloride 0.9%, traMADoL    Intake/Output - Last 3 Shifts         12/04 0700  12/05 0659 12/05 0700  12/06 0659 12/06 0700  12/07 0659    P.O.  505     I.V. (mL/kg) 2070.4 (33.3) 1923.5 (30.9) 22.7 (0.4)    Blood       IV Piggyback 144.7 841.6 100    Total Intake(mL/kg) 2215 (35.6) 3270.1 (52.6) 122.7 (2)    Urine (mL/kg/hr) 3675 (2.5) 1330 (0.9) 150 (0.5)    Emesis/NG output 150      Stool 0 0 0    Total Output 3825 1330 150    Net -1610 +1940.1 -27.3           Stool Occurrence 1 x 4 x 1 x    Emesis Occurrence 1 x               Review of Systems   Constitutional:  Positive for activity change, appetite change (decreased appetite), fatigue and fever. Negative for chills.   HENT: Negative.     Eyes: Negative.    Respiratory:   "Negative for shortness of breath.    Cardiovascular:  Negative for chest pain and leg swelling.   Gastrointestinal:  Positive for nausea. Negative for abdominal distention, constipation, diarrhea and vomiting.   Endocrine: Negative.    Genitourinary:  Negative for dysuria, hematuria and urgency.   Musculoskeletal: Negative.    Skin:  Positive for wound.   Allergic/Immunologic: Positive for immunocompromised state.   Neurological:  Positive for weakness. Negative for dizziness and light-headedness.   Hematological: Negative.    Psychiatric/Behavioral:  Negative for agitation and confusion. The patient is not nervous/anxious.     Objective:     Vital Signs (Most Recent):  Temp: 97.8 °F (36.6 °C) (12/06/22 1204)  Pulse: 106 (12/06/22 1204)  Resp: 18 (12/06/22 1204)  BP: (!) 129/99 (12/06/22 1204)  SpO2: 100 % (12/06/22 1204)   Vital Signs (24h Range):  Temp:  [97.5 °F (36.4 °C)-98.7 °F (37.1 °C)] 97.8 °F (36.6 °C)  Pulse:  [] 106  Resp:  [18] 18  SpO2:  [94 %-100 %] 100 %  BP: (103-134)/(49-99) 129/99     Weight: 62.2 kg (137 lb 2 oz)  Height: 5' 4" (162.6 cm)  Body mass index is 23.54 kg/m².    Physical Exam  Vitals and nursing note reviewed.   Constitutional:       General: She is not in acute distress.     Appearance: She is ill-appearing.   Cardiovascular:      Rate and Rhythm: Regular rhythm. Tachycardia present.      Heart sounds: No murmur heard.    No gallop.   Pulmonary:      Effort: Pulmonary effort is normal.      Breath sounds: No wheezing or rales.   Abdominal:      General: There is no distension.      Tenderness: There is abdominal tenderness. There is no guarding or rebound.   Musculoskeletal:         General: No tenderness.      Cervical back: Normal range of motion.      Right lower leg: No edema.      Left lower leg: No edema.   Skin:     General: Skin is warm.   Neurological:      Mental Status: She is alert and oriented to person, place, and time.      Motor: Weakness present.   Psychiatric:  "        Mood and Affect: Mood normal.         Behavior: Behavior normal.         Thought Content: Thought content normal.         Judgment: Judgment normal.       Laboratory:  CBC:   Recent Labs   Lab 12/04/22  1814 12/05/22  0536 12/06/22  1020   WBC 17.30* 19.54* 13.76*   RBC 2.90* 2.88* 3.08*   HGB 8.8* 8.9* 9.2*   HCT 26.0* 26.5* 27.9*    394 491*   MCV 90 92 91   MCH 30.3 30.9 29.9   MCHC 33.8 33.6 33.0       BMP:   Recent Labs   Lab 12/04/22  2044 12/05/22  0536 12/06/22  1020   * 120* 107    140 140   K 3.2* 3.2* 2.8*    104 106   CO2 21* 21* 21*   BUN 19 20 24*   CREATININE 3.5* 3.3* 3.0*   CALCIUM 8.7 8.8 9.0       Labs within the past 24 hours have been reviewed.    Diagnostic Results:  CT - Abd/Pelvic: No results found. However, due to the size of the patient record, not all encounters were searched. Please check Results Review for a complete set of results.  US - Kidney: Results for orders placed during the hospital encounter of 12/01/22    US Transplant Kidney With Doppler    Narrative  EXAMINATION:  US TRANSPLANT KIDNEY WITH DOPPLER    CLINICAL HISTORY:  s/p SPK. increase in Cr, severe abdominal pain;    TECHNIQUE:  Transplant renal ultrasound of the right lower quadrant with color flow doppler and duplex analysis.    COMPARISON:  Renal transplant ultrasound 12/03/2022.  12/01/2022.    FINDINGS:  Status post right lower quadrant renal transplant on 11/03/2022.  The renal transplant measures 12.5 cm and demonstrates no focal parenchymal abnormality. Minimal transplant hydronephrosis.  No peritransplant fluid.    Renal arterial resistive indices are as follows: Interlobar 0.77 (previously 0.81), segmental Up 0.75 (previously 0.72), segmental Mid 0.77 (previously 0.78), segmental Low 0.76 (previously 0.80).  There is no evidence of a tardus parvus waveform. The maximum velocity in the main renal artery is 236 (previously 187) cm/sec.  Maximum velocity in the main renal vein is 139  (previously 115) centimeters/second.  RA/iliac ratio 0.97    The renal transplant venous system is unremarkable.    Bladder is decompressed around Moncada catheter.    Impression  Minimal transplant hydronephrosis.    Mild interval increased main renal artery and main renal vein peak systolic velocity compared to prior.  Recommend attention on follow-up.    Intraparenchymal resistive indices are improved compared to multiple prior exams.    Electronically signed by resident: Laura Sifuentes  Date:    12/04/2022  Time:    15:10    Electronically signed by: Zulma Anaya MD  Date:    12/04/2022  Time:    16:05    US - Pancreas: Results for orders placed during the hospital encounter of 12/01/22    US Pancreas Transplant Post    Narrative  EXAMINATION:  US DOPPLER PANCREAS TRANSPLANT POST (XPD)    CLINICAL HISTORY:  s/p SPK; r/o pancreas infarct;    TECHNIQUE:  Grayscale, color flow, and spectral analysis was used to evaluate the pancreas allograft using transabdominal ultrasound technique.    COMPARISON:  Ultrasound from 11/25/2022    FINDINGS:  Pancreatic allograft demonstrates heterogeneous area near the pancreatic head neck, not significantly changed compared to prior examination.  There are no peripancreatic fluid collections.    Velocities:    Conduit:216 cm/s    Splenic artery:96 cm/s    Splenic vein:14 cm/s    Portal vein:109 cm/s    Superior mesenteric vein:40 cm/s    Iliac artery:204 cm/s    SMA of Y graft: 38 cm/s    Resistive indices range from 0.62-0.78.    Impression  Satisfactory Doppler evaluation of the pancreatic allograft.    Redemonstration of heterogeneous, hypoechoic area of the pancreatic head/neck with adjacent bowel.  Findings are nonspecific and continued follow-up recommended.      Electronically signed by: Jim Cedeño MD  Date:    12/05/2022  Time:    12:30

## 2022-12-06 NOTE — SUBJECTIVE & OBJECTIVE
Past Medical History:   Diagnosis Date    Acute kidney injury superimposed on chronic kidney disease 4/7/2021    Anemia     Chronic hypertension with exacerbation during pregnancy in second trimester 11/6/2020    Current regimen (11/6/20):  - Carvedilol 12.5 mg BID - Nifedipine 30 mg daily Baseline CKD + proteinuria    Chronic kidney disease     Depression     Diabetes mellitus     Diabetic retinopathy     Diarrhea     Encounter for blood transfusion     End stage renal failure on dialysis     Gastroparesis     Glaucoma     Hepatomegaly 4/29/2021    Hx of psychiatric care     Hyperlipidemia     Hypertension     Nephrotic syndrome     Nephrotic syndrome 3/4/2021    Nonspecific reaction to tuberculin skin test without active tuberculosis 10/1/2021    Palpitations     Poor fetal growth affecting management of mother in second trimester 10/15/2020    Restrictive lung disease     Retinal detachment     Severe pre-eclampsia in second trimester 11/6/2020    Type 1 diabetes mellitus with end-stage renal disease (ESRD) 2/24/2015    Followed by Dr. Mayo.  Last A1C was 13  Currently on lantus 20 units qAM and humolog 10 units with meals  - a fetal echocardiogram around 22-24 weeks - needs eye exam, podiatry exam  - needs EKG, maternal echo and fu with cardiology  - ASA 81mg, folic acid 4mg PO daily - hemoglobin A1c every 4-6 weeks -24 hour urine for protein and creatinine clearance should be performed. Patient will also need a       Past Surgical History:   Procedure Laterality Date    AV FISTULA PLACEMENT Left 04/07/2021    Procedure: CREATION, AV FISTULA;  Surgeon: Roberto Ryan MD;  Location: [facility] OR [address] [place] [address];  Service: Peripheral Vascular;  Laterality: Left;    COLONOSCOPY N/A 03/16/2022    Procedure: COLONOSCOPY;  Surgeon: Tavo Kwok MD;  Location: [facility] (4TH FLR);  Service: Endoscopy;  Laterality: N/A;  Questionable history of delayed gastric emptying longstanding diabetes now on eating pre transplant  workup for history of nausea vomiting which seems to have improved with dialysis also chronic diarrhea and history of anemia pre transplant workup for kidney transplant. 3    CYSTOSCOPY      ESOPHAGOGASTRODUODENOSCOPY N/A 03/16/2022    Procedure: EGD (ESOPHAGOGASTRODUODENOSCOPY);  Surgeon: Tavo Kwok MD;  Location: [facility] (4TH FLR);  Service: Endoscopy;  Laterality: N/A;  Questionable history of delayed gastric emptying longstanding diabetes now on eating pre transplant workup for history of nausea vomiting which seems to have improved with dialysis also chronic diarrhea and history of anemia pre transplant workup for    FISTULOGRAM N/A 08/11/2021    Procedure: Fistulogram;  Surgeon: Roberto Ryan MD;  Location: [facility] CATH LAB;  Service: Cardiology;  Laterality: N/A;    KIDNEY TRANSPLANT Right 11/02/2022    Procedure: TRANSPLANT, KIDNEY;  Surgeon: Kumar Rodriges Jr., MD;  Location: [facility] OR 2ND FLR;  Service: Transplant;  Laterality: Right;    LASER PHOTOCOAGULATION OF RETINA Right 05/31/2022    Procedure: PHOTOCOAGULATION, RETINA, USING LASER;  Surgeon: Maximilian Montaño MD;  Location: [facility] OR 1ST FLR;  Service: Ophthalmology;  Laterality: Right;    PERCUTANEOUS TRANSLUMINAL ANGIOPLASTY OF ARTERIOVENOUS FISTULA N/A 08/11/2021    Procedure: PTA, AV FISTULA;  Surgeon: Roberto Ryan MD;  Location: [facility] CATH LAB;  Service: Cardiology;  Laterality: N/A;    REMOVAL IMPLANT, POSTERIOR SEGMENT, INTRAOCULAR Right 02/01/2022    Procedure: REMOVAL IMPLANT, POSTERIOR SEGMENT, INTRAOCULAR;  Surgeon: Maximilian Montaño MD;  Location: [facility] 1ST FLR;  Service: Ophthalmology;  Laterality: Right;    REPAIR OF RETINAL DETACHMENT WITH VITRECTOMY Right 01/25/2022    Procedure: REPAIR, RETINAL DETACHMENT, WITH VITRECTOMY, MEMBRANE PEEL, LASER, INJECTION OF GAS VS OIL;  Surgeon: Maximilian Montaño MD;  Location: [facility] OR 1ST FLR;  Service: Ophthalmology;  Laterality: Right;    REVISION OF ARTERIOVENOUS  FISTULA Left 12/10/2021    Procedure: REVISION, AV FISTULA with BVT;  Surgeon: Roberto Ryan MD;  Location: [facility] OR 2ND FLR;  Service: Peripheral Vascular;  Laterality: Left;    TRANSPLANTATION OF PANCREAS N/A 11/02/2022    Procedure: TRANSPLANT, PANCREAS;  Surgeon: Kumar Rodriges Jr., MD;  Location: [facility] OR 2ND FLR;  Service: Transplant;  Laterality: N/A;    VITRECTOMY BY PARS PLANA APPROACH Right 02/01/2022    Procedure: VITRECTOMY, PARS PLANA APPROACH;  Surgeon: Maximilian Montaño MD;  Location: [facility] OR 1ST FLR;  Service: Ophthalmology;  Laterality: Right;    VITRECTOMY BY PARS PLANA APPROACH Right 05/31/2022    Procedure: VITRECTOMY, PARS PLANA APPROACH;  Surgeon: Maximilian Montñao MD;  Location: [facility] OR 1ST FLR;  Service: Ophthalmology;  Laterality: Right;       Review of patient's allergies indicates:  No Known Allergies  Family History       Problem Relation (Age of Onset)    Diabetes Brother    Heart disease Father    Hypertension Mother          Tobacco Use    Smoking status: Never    Smokeless tobacco: Never   Substance and Sexual Activity    Alcohol use: No    Drug use: No    Sexual activity: Not Currently     Partners: Male     Comment: monogamous     Review of Systems   Constitutional:  Negative for appetite change and chills.   Gastrointestinal:  Positive for diarrhea. Negative for abdominal distention, abdominal pain, anal bleeding, blood in stool, nausea and vomiting.   Objective:     Vital Signs (Most Recent):  Temp: 97.8 °F (36.6 °C) (12/06/22 1204)  Pulse: 106 (12/06/22 1204)  Resp: 18 (12/06/22 1204)  BP: (!) 129/99 (12/06/22 1204)  SpO2: 100 % (12/06/22 1204)   Vital Signs (24h Range):  Temp:  [97.5 °F (36.4 °C)-98.7 °F (37.1 °C)] 97.8 °F (36.6 °C)  Pulse:  [] 106  Resp:  [18] 18  SpO2:  [94 %-100 %] 100 %  BP: (103-134)/(49-99) 129/99     Weight: 62.2 kg (137 lb 2 oz) (12/04/22 0345)  Body mass index is 23.54 kg/m².      Intake/Output Summary (Last 24 hours) at 12/6/2022  1258  Last data filed at 12/6/2022 0851  Gross per 24 hour   Intake 2947.72 ml   Output 1480 ml   Net 1467.72 ml       Lines/Drains/Airways       Drain  Duration                  Hemodialysis AV Fistula Left upper arm -- days         Urethral Catheter 12/04/22 1350 1 day              Peripheral Intravenous Line  Duration                  Midline Catheter Insertion/Assessment  - Single Lumen 12/04/22 1845 Right brachial vein  1 day                    Physical Exam  Vitals reviewed.   Constitutional:       General: She is not in acute distress.     Appearance: Normal appearance. She is not ill-appearing.   HENT:      Head: Normocephalic and atraumatic.      Mouth/Throat:      Mouth: Mucous membranes are moist.   Eyes:      Extraocular Movements: Extraocular movements intact.      Pupils: Pupils are equal, round, and reactive to light.   Cardiovascular:      Rate and Rhythm: Normal rate.   Pulmonary:      Effort: Pulmonary effort is normal. No respiratory distress.   Abdominal:      General: There is no distension.      Palpations: Abdomen is soft.      Tenderness: There is no abdominal tenderness. There is no guarding or rebound.      Comments: Well healed midline incision   Skin:     General: Skin is warm.   Neurological:      Mental Status: She is alert and oriented to person, place, and time. Mental status is at baseline.   Psychiatric:         Mood and Affect: Mood normal.         Behavior: Behavior normal.       Significant Labs:  CBC:   Recent Labs   Lab 12/04/22 1814 12/05/22  0536 12/06/22  1020   WBC 17.30* 19.54* 13.76*   HGB 8.8* 8.9* 9.2*   HCT 26.0* 26.5* 27.9*    394 491*     BMP:   Recent Labs   Lab 12/06/22  1020         K 2.8*      CO2 21*   BUN 24*   CREATININE 3.0*   CALCIUM 9.0   MG 2.1     CMP:   Recent Labs   Lab 12/04/22  1814 12/04/22  2044 12/06/22  1020   *  838*   < > 107   CALCIUM 6.9*  6.9*   < > 9.0   ALBUMIN 2.0*  2.0*   < > 2.2*   PROT 5.1*  --   --       139   < > 140   K 2.6*  2.6*   < > 2.8*   CO2 40*  40*   < > 21*   CL 89*  89*   < > 106   BUN 17  17   < > 24*   CREATININE 3.2*  3.2*   < > 3.0*   ALKPHOS 74  --   --    ALT 15  --   --    AST 15  --   --    BILITOT 0.4  --   --     < > = values in this interval not displayed.     Stool C. diff: No results for input(s): CDIFFICILEAN, CDIFFTOX in the last 48 hours.  All pertinent lab results from the last 24 hours have been reviewed.    Significant Imaging:  Imaging results within the past 24 hours have been reviewed.

## 2022-12-06 NOTE — PLAN OF CARE
- Patient is a kidney-pancreas transplant recipient from 11/3/22, admitted 12/1/22 with fever, abdominal pain, and nausea/vomiting.  - Overnight, patient has been afebrile (last fever was ~0500 on 12/5).  - Patient has been having diarrhea and fecal incontinence. Patient's stool is so liquid that she cannot control it and she states she does not feel when her bowels are moving.  - Stool sample collected for GI pathogens panel per order.  - PA made aware of ongoing/worsening diarrhea. Patient is on several IV antiinfectives. PA stated OK to follow current orders although it is possible one or more of these medications is contributing to diarrhea.  - Patient requested trial of PO overnight - discussed with PA who reviewed pancreas ultrasound findings. Orders received for clear liquid diet. RN education patient and mother regarding what clear liquid diet entails (list provided). Patient has been drinking juice and tea overnight without issues - no nausea.  - Moncada in place for hydronephrosis; only 530 mL of urine output overnight.  - IV fluids: NS at 75 mL/hr.  - Standing BPs monitored.  - BG monitored q6h. BG= 108, 103 overnight.

## 2022-12-06 NOTE — CONSULTS
[facility] - Transplant Stepdown  Gastroenterology  Consult Note    Patient Name: Isabela Read  MRN: [id number]  Admission Date: 12/1/2022  Hospital Length of Stay: 4 days  Code Status: Full Code   Attending Provider: Melani Mcintyre MD   Consulting Provider: Ana Rosa Barfield MD  Primary Care Physician: Tavo Bhatia MD  Principal Problem:Sepsis due to Enterococcus    Inpatient consult to Gastroenterology  Consult performed by: Ana Rosa Barfield MD  Consult ordered by: Daniella Vargas PA-C        Subjective:     HPI:  Isabela Read is a 27 y.o. female for whom GI is consulted for colitis in the setting of recent kidney/pancreas transplant. She has a medical history significant for ESRD secondary to diabetic nephropathy and HTN, now s/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     Hospital course:  She initially presented on 12/01 with nausea and non-bloody vomiting. On admission, she was febrile and tachycardic with a leukocytosis on labs. She was started on broad spectrum abx for suspected sepsis. On 12/04, she began to experience episodes of loose non-bloody diarrhea. She reports only having 3 episodes that day and did not have any more until the following day. She had one episode this morning but has not had any since then. She had a CTAP on 12/04, which showed circumferential wall thickening of the cecum and proximal colon likely from colitis,typhlitis. Blood cultures have been negative since admission. Additional laboratory workup with a negative CMV PCR, RIP, C. diff and GI pathogen panel in process. ID was consulted and recommended zosyn and micafungin.      Past Medical History:   Diagnosis Date    Acute kidney injury superimposed on chronic kidney disease 4/7/2021    Anemia     Chronic hypertension with exacerbation during pregnancy in second trimester 11/6/2020    Current regimen (11/6/20):  - Carvedilol 12.5 mg BID - Nifedipine 30 mg daily Baseline  CKD + proteinuria    Chronic kidney disease     Depression     Diabetes mellitus     Diabetic retinopathy     Diarrhea     Encounter for blood transfusion     End stage renal failure on dialysis     Gastroparesis     Glaucoma     Hepatomegaly 4/29/2021    Hx of psychiatric care     Hyperlipidemia     Hypertension     Nephrotic syndrome     Nephrotic syndrome 3/4/2021    Nonspecific reaction to tuberculin skin test without active tuberculosis 10/1/2021    Palpitations     Poor fetal growth affecting management of mother in second trimester 10/15/2020    Restrictive lung disease     Retinal detachment     Severe pre-eclampsia in second trimester 11/6/2020    Type 1 diabetes mellitus with end-stage renal disease (ESRD) 2/24/2015    Followed by Dr. Mayo.  Last A1C was 13  Currently on lantus 20 units qAM and humolog 10 units with meals  - a fetal echocardiogram around 22-24 weeks - needs eye exam, podiatry exam  - needs EKG, maternal echo and fu with cardiology  - ASA 81mg, folic acid 4mg PO daily - hemoglobin A1c every 4-6 weeks -24 hour urine for protein and creatinine clearance should be performed. Patient will also need a       Past Surgical History:   Procedure Laterality Date    AV FISTULA PLACEMENT Left 04/07/2021    Procedure: CREATION, AV FISTULA;  Surgeon: Roberto Ryan MD;  Location: [facility] OR [address] [place] [address];  Service: Peripheral Vascular;  Laterality: Left;    COLONOSCOPY N/A 03/16/2022    Procedure: COLONOSCOPY;  Surgeon: Tavo Kwok MD;  Location: [facility] (4TH FLR);  Service: Endoscopy;  Laterality: N/A;  Questionable history of delayed gastric emptying longstanding diabetes now on eating pre transplant workup for history of nausea vomiting which seems to have improved with dialysis also chronic diarrhea and history of anemia pre transplant workup for kidney transplant. 3    CYSTOSCOPY      ESOPHAGOGASTRODUODENOSCOPY N/A 03/16/2022    Procedure: EGD  (ESOPHAGOGASTRODUODENOSCOPY);  Surgeon: Tavo Kwok MD;  Location: [facility] ENDO (4TH FLR);  Service: Endoscopy;  Laterality: N/A;  Questionable history of delayed gastric emptying longstanding diabetes now on eating pre transplant workup for history of nausea vomiting which seems to have improved with dialysis also chronic diarrhea and history of anemia pre transplant workup for    FISTULOGRAM N/A 08/11/2021    Procedure: Fistulogram;  Surgeon: Roberto Ryan MD;  Location: [facility] CATH LAB;  Service: Cardiology;  Laterality: N/A;    KIDNEY TRANSPLANT Right 11/02/2022    Procedure: TRANSPLANT, KIDNEY;  Surgeon: Kumar Rodriges Jr., MD;  Location: [facility] OR 2ND FLR;  Service: Transplant;  Laterality: Right;    LASER PHOTOCOAGULATION OF RETINA Right 05/31/2022    Procedure: PHOTOCOAGULATION, RETINA, USING LASER;  Surgeon: Maximilian Montaño MD;  Location: [facility] OR 1ST FLR;  Service: Ophthalmology;  Laterality: Right;    PERCUTANEOUS TRANSLUMINAL ANGIOPLASTY OF ARTERIOVENOUS FISTULA N/A 08/11/2021    Procedure: PTA, AV FISTULA;  Surgeon: Roberto Ryan MD;  Location: [facility] CATH LAB;  Service: Cardiology;  Laterality: N/A;    REMOVAL IMPLANT, POSTERIOR SEGMENT, INTRAOCULAR Right 02/01/2022    Procedure: REMOVAL IMPLANT, POSTERIOR SEGMENT, INTRAOCULAR;  Surgeon: Maximilian Montaño MD;  Location: [facility] OR 1ST FLR;  Service: Ophthalmology;  Laterality: Right;    REPAIR OF RETINAL DETACHMENT WITH VITRECTOMY Right 01/25/2022    Procedure: REPAIR, RETINAL DETACHMENT, WITH VITRECTOMY, MEMBRANE PEEL, LASER, INJECTION OF GAS VS OIL;  Surgeon: Maximilian Montaño MD;  Location: [facility] OR 1ST FLR;  Service: Ophthalmology;  Laterality: Right;    REVISION OF ARTERIOVENOUS FISTULA Left 12/10/2021    Procedure: REVISION, AV FISTULA with BVT;  Surgeon: Roberto Ryan MD;  Location: [facility] OR 2ND FLR;  Service: Peripheral Vascular;  Laterality: Left;    TRANSPLANTATION OF PANCREAS N/A 11/02/2022    Procedure:  TRANSPLANT, PANCREAS;  Surgeon: Kumar Rodriges Jr., MD;  Location: [facility] OR 2ND FLR;  Service: Transplant;  Laterality: N/A;    VITRECTOMY BY PARS PLANA APPROACH Right 02/01/2022    Procedure: VITRECTOMY, PARS PLANA APPROACH;  Surgeon: Maximilian Montaño MD;  [facility];  Service: Ophthalmology;  Laterality: Right;    VITRECTOMY BY PARS PLANA APPROACH Right 05/31/2022    Procedure: VITRECTOMY, PARS PLANA APPROACH;  Surgeon: Maximilian Montaño MD;  [facility];  Service: Ophthalmology;  Laterality: Right;       Review of patient's allergies indicates:  No Known Allergies  Family History       Problem Relation (Age of Onset)    Diabetes Brother    Heart disease Father    Hypertension Mother          Tobacco Use    Smoking status: Never    Smokeless tobacco: Never   Substance and Sexual Activity    Alcohol use: No    Drug use: No    Sexual activity: Not Currently     Partners: Male     Comment: monogamous     Review of Systems   Constitutional:  Negative for appetite change and chills.   Gastrointestinal:  Positive for diarrhea. Negative for abdominal distention, abdominal pain, anal bleeding, blood in stool, nausea and vomiting.   Objective:     Vital Signs (Most Recent):  Temp: 97.8 °F (36.6 °C) (12/06/22 1204)  Pulse: 106 (12/06/22 1204)  Resp: 18 (12/06/22 1204)  BP: (!) 129/99 (12/06/22 1204)  SpO2: 100 % (12/06/22 1204)   Vital Signs (24h Range):  Temp:  [97.5 °F (36.4 °C)-98.7 °F (37.1 °C)] 97.8 °F (36.6 °C)  Pulse:  [] 106  Resp:  [18] 18  SpO2:  [94 %-100 %] 100 %  BP: (103-134)/(49-99) 129/99     Weight: 62.2 kg (137 lb 2 oz) (12/04/22 0345)  Body mass index is 23.54 kg/m².      Intake/Output Summary (Last 24 hours) at 12/6/2022 1258  Last data filed at 12/6/2022 0851  Gross per 24 hour   Intake 2947.72 ml   Output 1480 ml   Net 1467.72 ml       Lines/Drains/Airways       Drain  Duration                  Hemodialysis AV Fistula Left upper arm -- days          Urethral Catheter 12/04/22 1350 1 day              Peripheral Intravenous Line  Duration                  Midline Catheter Insertion/Assessment  - Single Lumen 12/04/22 1845 Right brachial vein  1 day                    Physical Exam  Vitals reviewed.   Constitutional:       General: She is not in acute distress.     Appearance: Normal appearance. She is not ill-appearing.   HENT:      Head: Normocephalic and atraumatic.      Mouth/Throat:      Mouth: Mucous membranes are moist.   Eyes:      Extraocular Movements: Extraocular movements intact.      Pupils: Pupils are equal, round, and reactive to light.   Cardiovascular:      Rate and Rhythm: Normal rate.   Pulmonary:      Effort: Pulmonary effort is normal. No respiratory distress.   Abdominal:      General: There is no distension.      Palpations: Abdomen is soft.      Tenderness: There is no abdominal tenderness. There is no guarding or rebound.      Comments: Well healed midline incision   Skin:     General: Skin is warm.   Neurological:      Mental Status: She is alert and oriented to person, place, and time. Mental status is at baseline.   Psychiatric:         Mood and Affect: Mood normal.         Behavior: Behavior normal.       Significant Labs:  CBC:   Recent Labs   Lab 12/04/22  1814 12/05/22  0536 12/06/22  1020   WBC 17.30* 19.54* 13.76*   HGB 8.8* 8.9* 9.2*   HCT 26.0* 26.5* 27.9*    394 491*     BMP:   Recent Labs   Lab 12/06/22  1020         K 2.8*      CO2 21*   BUN 24*   CREATININE 3.0*   CALCIUM 9.0   MG 2.1     CMP:   Recent Labs   Lab 12/04/22  1814 12/04/22  2044 12/06/22  1020   *  838*   < > 107   CALCIUM 6.9*  6.9*   < > 9.0   ALBUMIN 2.0*  2.0*   < > 2.2*   PROT 5.1*  --   --      139   < > 140   K 2.6*  2.6*   < > 2.8*   CO2 40*  40*   < > 21*   CL 89*  89*   < > 106   BUN 17  17   < > 24*   CREATININE 3.2*  3.2*   < > 3.0*   ALKPHOS 74  --   --    ALT 15  --   --    AST 15  --   --    BILITOT  0.4  --   --     < > = values in this interval not displayed.     Stool C. diff: No results for input(s): CDIFFICILEAN, CDIFFTOX in the last 48 hours.  All pertinent lab results from the last 24 hours have been reviewed.    Significant Imaging:  Imaging results within the past 24 hours have been reviewed.    Assessment/Plan:     Diarrhea  27 year old female with ESRD secondary to diabetic nephropathy and HTN, now s/p SPK 11/3/22 (Thymo induction, CMV D-/R+) on Tacrolimus and Cellcept admitted with sepsis, started on broad spectrum abx and subsequently developed multiple episodes of non-blood diarrhea. Blood cultures, C. Diff, and CMV PCR all negative. CTAP with which showed circumferential wall thickening of the cecum and proximal colon likely from colitis,typhlitis. ID following, recommended zosyn and micafungin. Cellcept being held in the setting of infection.    Our recommendations are as follows:  - Follow up GI pathogen panel  - Continue broad spectrum abx per ID recommendations  - No indication for endoscopy at this time. If symptoms do not improve or worsen, will consider.   - Would avoid use of anti-diarrheal agents as infectious etiology of diarrhea has not been ruled out      Thank you for your consult. I will follow-up with patient. Please contact us if you have any additional questions.    Ana Rosa Barfield MD  Gastroenterology  [facility] - Transplant Stepdown

## 2022-12-06 NOTE — NURSING
[place] CATHERINE notified of episode of stool incontinence ( mod liquid brown) Patient states she has had issues in the past with this and had GI consults with multiple scopes.Also states she had to wear adult diapers for this issue.

## 2022-12-07 LAB
ALBUMIN SERPL BCP-MCNC: 2.2 G/DL (ref 3.5–5.2)
AMYLASE SERPL-CCNC: 40 U/L (ref 20–110)
ANION GAP SERPL CALC-SCNC: 11 MMOL/L (ref 8–16)
BASOPHILS # BLD AUTO: 0.04 K/UL (ref 0–0.2)
BASOPHILS NFR BLD: 0.4 % (ref 0–1.9)
BUN SERPL-MCNC: 29 MG/DL (ref 6–20)
CALCIUM SERPL-MCNC: 8.9 MG/DL (ref 8.7–10.5)
CHLORIDE SERPL-SCNC: 109 MMOL/L (ref 95–110)
CO2 SERPL-SCNC: 20 MMOL/L (ref 23–29)
CREAT SERPL-MCNC: 3.4 MG/DL (ref 0.5–1.4)
DIFFERENTIAL METHOD: ABNORMAL
EOSINOPHIL # BLD AUTO: 0.1 K/UL (ref 0–0.5)
EOSINOPHIL NFR BLD: 0.6 % (ref 0–8)
ERYTHROCYTE [DISTWIDTH] IN BLOOD BY AUTOMATED COUNT: 15.8 % (ref 11.5–14.5)
EST. GFR  (NO RACE VARIABLE): 18.2 ML/MIN/1.73 M^2
FERRITIN SERPL-MCNC: ABNORMAL NG/ML (ref 20–300)
FIBRINOGEN PPP-MCNC: 678 MG/DL (ref 182–400)
GLUCOSE SERPL-MCNC: 91 MG/DL (ref 70–110)
HCT VFR BLD AUTO: 30.3 % (ref 37–48.5)
HGB BLD-MCNC: 9.7 G/DL (ref 12–16)
IMM GRANULOCYTES # BLD AUTO: 0.09 K/UL (ref 0–0.04)
IMM GRANULOCYTES NFR BLD AUTO: 0.9 % (ref 0–0.5)
INR PPP: 1.1 (ref 0.8–1.2)
LIPASE SERPL-CCNC: 10 U/L (ref 4–60)
LYMPHOCYTES # BLD AUTO: 0.5 K/UL (ref 1–4.8)
LYMPHOCYTES NFR BLD: 4.7 % (ref 18–48)
MAGNESIUM SERPL-MCNC: 1.9 MG/DL (ref 1.6–2.6)
MCH RBC QN AUTO: 29.5 PG (ref 27–31)
MCHC RBC AUTO-ENTMCNC: 32 G/DL (ref 32–36)
MCV RBC AUTO: 92 FL (ref 82–98)
MONOCYTES # BLD AUTO: 0.6 K/UL (ref 0.3–1)
MONOCYTES NFR BLD: 6 % (ref 4–15)
NEUTROPHILS # BLD AUTO: 9.2 K/UL (ref 1.8–7.7)
NEUTROPHILS NFR BLD: 87.4 % (ref 38–73)
NRBC BLD-RTO: 0 /100 WBC
PHOSPHATE SERPL-MCNC: 2.8 MG/DL (ref 2.7–4.5)
PLATELET # BLD AUTO: 558 K/UL (ref 150–450)
PMV BLD AUTO: 10.2 FL (ref 9.2–12.9)
POCT GLUCOSE: 101 MG/DL (ref 70–110)
POCT GLUCOSE: 109 MG/DL (ref 70–110)
POCT GLUCOSE: 91 MG/DL (ref 70–110)
POTASSIUM SERPL-SCNC: 3.7 MMOL/L (ref 3.5–5.1)
PROTHROMBIN TIME: 11.4 SEC (ref 9–12.5)
RBC # BLD AUTO: 3.29 M/UL (ref 4–5.4)
SODIUM SERPL-SCNC: 140 MMOL/L (ref 136–145)
TACROLIMUS BLD-MCNC: 10.6 NG/ML (ref 5–15)
TRIGL SERPL-MCNC: 142 MG/DL (ref 30–150)
WBC # BLD AUTO: 10.47 K/UL (ref 3.9–12.7)

## 2022-12-07 PROCEDURE — 20600001 HC STEP DOWN PRIVATE ROOM

## 2022-12-07 PROCEDURE — 25000003 PHARM REV CODE 250: Performed by: PHYSICIAN ASSISTANT

## 2022-12-07 PROCEDURE — 99233 PR SUBSEQUENT HOSPITAL CARE,LEVL III: ICD-10-PCS | Mod: ,,, | Performed by: PHYSICIAN ASSISTANT

## 2022-12-07 PROCEDURE — 25000003 PHARM REV CODE 250: Performed by: INTERNAL MEDICINE

## 2022-12-07 PROCEDURE — 82150 ASSAY OF AMYLASE: CPT | Performed by: INTERNAL MEDICINE

## 2022-12-07 PROCEDURE — 36415 COLL VENOUS BLD VENIPUNCTURE: CPT | Performed by: INTERNAL MEDICINE

## 2022-12-07 PROCEDURE — 63600175 PHARM REV CODE 636 W HCPCS: Mod: JG | Performed by: INTERNAL MEDICINE

## 2022-12-07 PROCEDURE — 63600175 PHARM REV CODE 636 W HCPCS: Performed by: PHYSICIAN ASSISTANT

## 2022-12-07 PROCEDURE — 80069 RENAL FUNCTION PANEL: CPT | Performed by: INTERNAL MEDICINE

## 2022-12-07 PROCEDURE — 84478 ASSAY OF TRIGLYCERIDES: CPT | Performed by: INTERNAL MEDICINE

## 2022-12-07 PROCEDURE — 82728 ASSAY OF FERRITIN: CPT | Performed by: INTERNAL MEDICINE

## 2022-12-07 PROCEDURE — 85384 FIBRINOGEN ACTIVITY: CPT | Performed by: INTERNAL MEDICINE

## 2022-12-07 PROCEDURE — 83690 ASSAY OF LIPASE: CPT | Performed by: INTERNAL MEDICINE

## 2022-12-07 PROCEDURE — 80197 ASSAY OF TACROLIMUS: CPT | Performed by: INTERNAL MEDICINE

## 2022-12-07 PROCEDURE — 63600175 PHARM REV CODE 636 W HCPCS: Performed by: INTERNAL MEDICINE

## 2022-12-07 PROCEDURE — 85025 COMPLETE CBC W/AUTO DIFF WBC: CPT | Performed by: INTERNAL MEDICINE

## 2022-12-07 PROCEDURE — 85610 PROTHROMBIN TIME: CPT | Performed by: INTERNAL MEDICINE

## 2022-12-07 PROCEDURE — 99233 PR SUBSEQUENT HOSPITAL CARE,LEVL III: ICD-10-PCS | Mod: ,,, | Performed by: INTERNAL MEDICINE

## 2022-12-07 PROCEDURE — 83735 ASSAY OF MAGNESIUM: CPT | Performed by: INTERNAL MEDICINE

## 2022-12-07 PROCEDURE — 99233 SBSQ HOSP IP/OBS HIGH 50: CPT | Mod: ,,, | Performed by: INTERNAL MEDICINE

## 2022-12-07 PROCEDURE — 99233 SBSQ HOSP IP/OBS HIGH 50: CPT | Mod: ,,, | Performed by: PHYSICIAN ASSISTANT

## 2022-12-07 RX ORDER — TACROLIMUS 1 MG/1
2 CAPSULE ORAL 2 TIMES DAILY
Status: DISCONTINUED | OUTPATIENT
Start: 2022-12-07 | End: 2022-12-08

## 2022-12-07 RX ADMIN — ASPIRIN 81 MG 81 MG: 81 TABLET ORAL at 08:12

## 2022-12-07 RX ADMIN — TACROLIMUS 2 MG: 1 CAPSULE ORAL at 10:12

## 2022-12-07 RX ADMIN — MUPIROCIN: 20 OINTMENT TOPICAL at 09:12

## 2022-12-07 RX ADMIN — ATORVASTATIN CALCIUM 40 MG: 40 TABLET, FILM COATED ORAL at 08:12

## 2022-12-07 RX ADMIN — SODIUM CHLORIDE: 0.9 INJECTION, SOLUTION INTRAVENOUS at 08:12

## 2022-12-07 RX ADMIN — PREDNISONE 5 MG: 5 TABLET ORAL at 08:12

## 2022-12-07 RX ADMIN — VENLAFAXINE HYDROCHLORIDE 37.5 MG: 37.5 CAPSULE, EXTENDED RELEASE ORAL at 08:12

## 2022-12-07 RX ADMIN — TACROLIMUS 2 MG: 1 CAPSULE ORAL at 05:12

## 2022-12-07 RX ADMIN — GANCICLOVIR SODIUM 39 MG: 50 INJECTION, POWDER, LYOPHILIZED, FOR SOLUTION INTRAVENTRICULAR at 06:12

## 2022-12-07 RX ADMIN — MUPIROCIN: 20 OINTMENT TOPICAL at 08:12

## 2022-12-07 RX ADMIN — PIPERACILLIN SODIUM AND TAZOBACTAM SODIUM 4.5 G: 4; .5 INJECTION, POWDER, LYOPHILIZED, FOR SOLUTION INTRAVENOUS at 08:12

## 2022-12-07 RX ADMIN — PIPERACILLIN SODIUM AND TAZOBACTAM SODIUM 4.5 G: 4; .5 INJECTION, POWDER, LYOPHILIZED, FOR SOLUTION INTRAVENOUS at 04:12

## 2022-12-07 RX ADMIN — SODIUM CHLORIDE: 0.9 INJECTION, SOLUTION INTRAVENOUS at 05:12

## 2022-12-07 RX ADMIN — MICAFUNGIN SODIUM 100 MG: 100 INJECTION, POWDER, LYOPHILIZED, FOR SOLUTION INTRAVENOUS at 12:12

## 2022-12-07 RX ADMIN — PIPERACILLIN SODIUM AND TAZOBACTAM SODIUM 4.5 G: 4; .5 INJECTION, POWDER, LYOPHILIZED, FOR SOLUTION INTRAVENOUS at 12:12

## 2022-12-07 RX ADMIN — SULFAMETHOXAZOLE AND TRIMETHOPRIM 1 TABLET: 400; 80 TABLET ORAL at 08:12

## 2022-12-07 NOTE — ASSESSMENT & PLAN NOTE
- H/H stable on AM labs  - currently menstruating  - given 1u prbc 12/3/22  - Parvo pending  - Hematology was consulted for a high ferritin level found during the work-up of anemia  - Hyperferritinemia most likely in the setting of sepsis and inflammation, and is improving  - H score 68 points, with <1% probability of Hemophagocytic syndrome.   - Plan to repeat ferritin one more time in the am

## 2022-12-07 NOTE — ASSESSMENT & PLAN NOTE
27-year-old female with history of DM, HTN c/b ESRD s/p SPK 11/3/2022, presented with fevers, nausea, vomiting, GEORGE, CT A/P with colitis, improving on pip-tazo and micafungin.    Recommendations:  Continue pip-tazo and micafungin while inpatient  Day prior to discharge, transition to amox-clav, dosed per [facility]  Last day of antibiotics 12/10/2022

## 2022-12-07 NOTE — PLAN OF CARE
- Patient is a kidney-pancreas transplant recipient from 11/3/22, admitted 12/1/22 with fever, abdominal pain, and nausea/vomiting.  - Overnight, patient has been afebrile (last fever was ~0500 on 12/5).  - No nausea overnight. Patient has been tolerating regular diet.  - Patient continues to have diarrhea and occasional fecal incontinence. GI pathogens panel in process.  - Moncada in place for hydronephrosis; 800 mL of urine output overnight.  - IV fluids: NS at 75 mL/hr.  - IV antiinfectives: Zosyn q8h; ganciclovir q24h; micafungin q24h.  - 2 potassium riders infused overnight for K= 3.4 on evening BMP.  - Standing BPs monitored.  - BG monitored AC/HS. BG= 104 at bedtime.  - Patient has mother at bedside.

## 2022-12-07 NOTE — PROGRESS NOTES
[facility]  Infectious Disease  Progress Note    Patient Name: Isabela Read  MRN: [id number]  Admission Date: 12/1/2022  Length of Stay: 5 days  Attending Physician: Melani Mcintyre MD  Primary Care Provider: Tavo Bhatia MD    Isolation Status: No active isolations  Assessment/Plan:      Fever  27-year-old female with history of DM, HTN c/b ESRD s/p SPK 11/3/2022, presented with fevers, nausea, vomiting, GEORGE, CT A/P with colitis, improving on pip-tazo and micafungin.    Recommendations:  Continue pip-tazo and micafungin while inpatient  Day prior to discharge, transition to amox-clav, dosed per CrCl  Last day of antibiotics 12/10/2022            Thank you for your consult. I will sign off. Please contact us if you have any additional questions.    Mallory Beckwith MD  Infectious Disease  [facility]    Subjective:     Principal Problem:Sepsis due to Enterococcus    HPI: No notes on file  Interval History:   No fevers overnight  Patient reports nausea, vomiting have resolved  No abdominal pain  Kidney team plans for kidney biopsy tomorrow    Review of Systems   Constitutional:  Positive for activity change. Negative for chills, diaphoresis and fever.   HENT:  Negative for rhinorrhea and sore throat.    Respiratory:  Negative for cough and shortness of breath.    Cardiovascular:  Negative for chest pain and leg swelling.   Gastrointestinal:  Negative for abdominal pain, diarrhea, nausea and vomiting.   Genitourinary:  Negative for dysuria and hematuria.   Musculoskeletal:  Negative for arthralgias and myalgias.   Skin:  Negative for rash.   Neurological:  Negative for headaches.   Objective:     Vital Signs (Most Recent):  Temp: 98.3 °F (36.8 °C) (12/07/22 1103)  Pulse: 104 (12/07/22 1152)  Resp: 17 (12/07/22 0945)  BP: (!) 140/78 (12/07/22 1103)  SpO2: 99 % (12/07/22 1103)   Vital Signs (24h Range):  Temp:  [98 °F (36.7 °C)-98.4 °F (36.9 °C)] 98.3 °F (36.8  °C)  Pulse:  [] 104  Resp:  [16-17] 17  SpO2:  [98 %-100 %] 99 %  BP: (104-140)/(56-78) 140/78     Weight: 64.6 kg (142 lb 6.7 oz)  Body mass index is 24.45 kg/m².    Estimated Creatinine Clearance: 21.5 mL/min (A) (based on SCr of 3.4 mg/dL (H)).    Physical Exam  Constitutional:       General: She is not in acute distress.     Appearance: She is well-developed. She is not diaphoretic.   HENT:      Head: Normocephalic and atraumatic.   Eyes:      Conjunctiva/sclera: Conjunctivae normal.   Pulmonary:      Effort: Pulmonary effort is normal. No respiratory distress.   Abdominal:      General: There is no distension.      Palpations: Abdomen is soft.   Skin:     General: Skin is warm and dry.      Findings: No erythema or rash.   Neurological:      Mental Status: She is alert.   Psychiatric:         Behavior: Behavior normal.       Significant Labs: All pertinent labs within the past 24 hours have been reviewed.    Significant Imaging: I have reviewed all pertinent imaging results/findings within the past 24 hours.

## 2022-12-07 NOTE — ASSESSMENT & PLAN NOTE
- Blood cx x 2, ngtd  - UA showed 11 WBCs, moderate bacteria, and 8 hyaline casts  - Urine cx with E. Faecium & Diphtheroids  - LA 0.6  - D/w ID, changed cefepime to zosyn, added luis carlos, and stopped dapto  - Repeat urine and blood cxs ngtd  - CT AP noted interval development of short segment circumferential wall thickening of the fluid-filled cecum and proximal colon with surrounding stranding and edema, perhaps infectious colitis, typhlitis or nonspecific colopathy  - C diff PCR negative  - CMV negative  - GI pathogens panel pending  - GI consulted due to fevers, diarrhea, and possible colitis on CT scan

## 2022-12-07 NOTE — ASSESSMENT & PLAN NOTE
- Cr 3.3 from 3.5  - DSA's undetected  - IR consulted for kidney biopsy Thursday 12/8  - Had ureteral stent removed 11/30/22  - Kidney US showed mild-mod hydro, dc placed  - Repeat US 12/3 with improved hydro and creatinine improved as well  - Dc upsized by urology due CT showing enlarged transplant kidney and distended bladder despite dc in adequate position.

## 2022-12-07 NOTE — SUBJECTIVE & OBJECTIVE
Interval History:   No fevers overnight  Patient reports nausea, vomiting have resolved  No abdominal pain  Kidney team plans for kidney biopsy tomorrow    Review of Systems   Constitutional:  Positive for activity change. Negative for chills, diaphoresis and fever.   HENT:  Negative for rhinorrhea and sore throat.    Respiratory:  Negative for cough and shortness of breath.    Cardiovascular:  Negative for chest pain and leg swelling.   Gastrointestinal:  Negative for abdominal pain, diarrhea, nausea and vomiting.   Genitourinary:  Negative for dysuria and hematuria.   Musculoskeletal:  Negative for arthralgias and myalgias.   Skin:  Negative for rash.   Neurological:  Negative for headaches.   Objective:     Vital Signs (Most Recent):  Temp: 98.3 °F (36.8 °C) (12/07/22 1103)  Pulse: 104 (12/07/22 1152)  Resp: 17 (12/07/22 0945)  BP: (!) 140/78 (12/07/22 1103)  SpO2: 99 % (12/07/22 1103)   Vital Signs (24h Range):  Temp:  [98 °F (36.7 °C)-98.4 °F (36.9 °C)] 98.3 °F (36.8 °C)  Pulse:  [] 104  Resp:  [16-17] 17  SpO2:  [98 %-100 %] 99 %  BP: (104-140)/(56-78) 140/78     Weight: 64.6 kg (142 lb 6.7 oz)  Body mass index is 24.45 kg/m².    Estimated Creatinine Clearance: 21.5 mL/min (A) (based on SCr of 3.4 mg/dL (H)).    Physical Exam  Constitutional:       General: She is not in acute distress.     Appearance: She is well-developed. She is not diaphoretic.   HENT:      Head: Normocephalic and atraumatic.   Eyes:      Conjunctiva/sclera: Conjunctivae normal.   Pulmonary:      Effort: Pulmonary effort is normal. No respiratory distress.   Abdominal:      General: There is no distension.      Palpations: Abdomen is soft.   Skin:     General: Skin is warm and dry.      Findings: No erythema or rash.   Neurological:      Mental Status: She is alert.   Psychiatric:         Behavior: Behavior normal.       Significant Labs: All pertinent labs within the past 24 hours have been reviewed.    Significant Imaging: I have  reviewed all pertinent imaging results/findings within the past 24 hours.

## 2022-12-07 NOTE — H&P
Inpatient Radiology Pre-procedure Note    History of Present Illness:  Isabela Read is a 27 y.o. female with ESRD secondary to diabetic nephropathy and HTN, now s/p SPK 11/3/22. Her post-op course was complicated by urinary retention requiring Moncada replacement, now resolved. She presents with complaints of ongoing nausea/vomiting, decreased appetite, intermittent abdominal pain. Pt is currently admitted for infectious workup and GEORGE. Pt was recently admitted for similar complaints.  She had ureteral stent removed outpatient 11/30/22. 12/1 CT AP noted for air w/in allograft, perhaps related to interval removal of stent as per read. Creatinine has been elevated and has not down trended.   IR is consulted for kidney biopsy.    Admission H&P reviewed.  Past Medical History:   Diagnosis Date    Acute kidney injury superimposed on chronic kidney disease 4/7/2021    Anemia     Chronic hypertension with exacerbation during pregnancy in second trimester 11/6/2020    Current regimen (11/6/20):  - Carvedilol 12.5 mg BID - Nifedipine 30 mg daily Baseline CKD + proteinuria    Chronic kidney disease     Depression     Diabetes mellitus     Diabetic retinopathy     Diarrhea     Encounter for blood transfusion     End stage renal failure on dialysis     Gastroparesis     Glaucoma     Hepatomegaly 4/29/2021    Hx of psychiatric care     Hyperlipidemia     Hypertension     Nephrotic syndrome     Nephrotic syndrome 3/4/2021    Nonspecific reaction to tuberculin skin test without active tuberculosis 10/1/2021    Palpitations     Poor fetal growth affecting management of mother in second trimester 10/15/2020    Restrictive lung disease     Retinal detachment     Severe pre-eclampsia in second trimester 11/6/2020    Type 1 diabetes mellitus with end-stage renal disease (ESRD) 2/24/2015    Followed by Dr. Mayo.  Last A1C was 13  Currently on lantus 20 units qAM and humolog 10 units with meals  - a fetal echocardiogram around  22-24 weeks - needs eye exam, podiatry exam  - needs EKG, maternal echo and fu with cardiology  - ASA 81mg, folic acid 4mg PO daily - hemoglobin A1c every 4-6 weeks -24 hour urine for protein and creatinine clearance should be performed. Patient will also need a     Past Surgical History:   Procedure Laterality Date    AV FISTULA PLACEMENT Left 04/07/2021    Procedure: CREATION, AV FISTULA;  Surgeon: Roberto Ryan MD;  Location: [facility] OR [address] [place] [address];  Service: Peripheral Vascular;  Laterality: Left;    COLONOSCOPY N/A 03/16/2022    Procedure: COLONOSCOPY;  Surgeon: Tavo Kwok MD;  Location: [facility] ENDO (4TH FLR);  Service: Endoscopy;  Laterality: N/A;  Questionable history of delayed gastric emptying longstanding diabetes now on eating pre transplant workup for history of nausea vomiting which seems to have improved with dialysis also chronic diarrhea and history of anemia pre transplant workup for kidney transplant. 3    CYSTOSCOPY      ESOPHAGOGASTRODUODENOSCOPY N/A 03/16/2022    Procedure: EGD (ESOPHAGOGASTRODUODENOSCOPY);  Surgeon: Taov Kwok MD;  Location: [facility] ENDO (4TH FLR);  Service: Endoscopy;  Laterality: N/A;  Questionable history of delayed gastric emptying longstanding diabetes now on eating pre transplant workup for history of nausea vomiting which seems to have improved with dialysis also chronic diarrhea and history of anemia pre transplant workup for    FISTULOGRAM N/A 08/11/2021    Procedure: Fistulogram;  Surgeon: Roberto Ryan MD;  Location: [facility] CATH LAB;  Service: Cardiology;  Laterality: N/A;    KIDNEY TRANSPLANT Right 11/02/2022    Procedure: TRANSPLANT, KIDNEY;  Surgeon: Kumar Rodriges Jr., MD;  [facility];  Service: Transplant;  Laterality: Right;    LASER PHOTOCOAGULATION OF RETINA Right 05/31/2022    Procedure: PHOTOCOAGULATION, RETINA, USING LASER;  Surgeon: Maximilian Montaño MD;  Location: [facility] OR 1ST FLR;  Service: Ophthalmology;   Laterality: Right;    PERCUTANEOUS TRANSLUMINAL ANGIOPLASTY OF ARTERIOVENOUS FISTULA N/A 08/11/2021    Procedure: PTA, AV FISTULA;  Surgeon: Roberto Ryan MD;  Location: [facility] CATH LAB;  Service: Cardiology;  Laterality: N/A;    REMOVAL IMPLANT, POSTERIOR SEGMENT, INTRAOCULAR Right 02/01/2022    Procedure: REMOVAL IMPLANT, POSTERIOR SEGMENT, INTRAOCULAR;  Surgeon: Maximilian Montaño MD;  [facility];  Service: Ophthalmology;  Laterality: Right;    REPAIR OF RETINAL DETACHMENT WITH VITRECTOMY Right 01/25/2022    Procedure: REPAIR, RETINAL DETACHMENT, WITH VITRECTOMY, MEMBRANE PEEL, LASER, INJECTION OF GAS VS OIL;  Surgeon: Maximilian Montaño MD;  [facility];  Service: Ophthalmology;  Laterality: Right;    REVISION OF ARTERIOVENOUS FISTULA Left 12/10/2021    Procedure: REVISION, AV FISTULA with BVT;  Surgeon: Roberto Ryan MD;  [facility];  Service: Peripheral Vascular;  Laterality: Left;    TRANSPLANTATION OF PANCREAS N/A 11/02/2022    Procedure: TRANSPLANT, PANCREAS;  Surgeon: Kumar Rodriges Jr., MD;  [facility];  Service: Transplant;  Laterality: N/A;    VITRECTOMY BY PARS PLANA APPROACH Right 02/01/2022    Procedure: VITRECTOMY, PARS PLANA APPROACH;  Surgeon: Maximilian Montaño MD;  [facility];  Service: Ophthalmology;  Laterality: Right;    VITRECTOMY BY PARS PLANA APPROACH Right 05/31/2022    Procedure: VITRECTOMY, PARS PLANA APPROACH;  Surgeon: Maximilian Montaño MD;  [facility];  Service: Ophthalmology;  Laterality: Right;       Review of Systems:   As documented in primary team H&P    Home Meds:   Prior to Admission medications    Medication Sig Start Date End Date Taking? Authorizing Provider   aspirin (ECOTRIN) 81 MG EC tablet Take 81 mg by mouth once daily.   Yes Historical Provider   ciprofloxacin HCl (CIPRO) 500 MG tablet Take 1 tablet (500 mg total) by mouth every 12 (twelve) hours. STOP  12/5/22 11/28/22  Yes Elmira Celis MD   ergocalciferol (ERGOCALCIFEROL) 50,000 unit Cap Take 1 capsule (50,000 Units total) by mouth every 7 days. 11/11/22  Yes Kumar Rodriges Jr., MD   magnesium oxide (MAG-OX) 400 mg (241.3 mg magnesium) tablet Take 1 tablet (400 mg total) by mouth 2 (two) times daily. 11/10/22 11/10/23 Yes Tammie Broussard,    metoprolol tartrate (LOPRESSOR) 25 MG tablet Take 1 tablet (25 mg total) by mouth 2 (two) times daily. HOLD SBP <120 or HR <60 11/28/22  Yes Elmira Celis MD   NIFEdipine (PROCARDIA-XL) 30 MG (OSM) 24 hr tablet Take 1 tablet (30 mg total) by mouth once daily. HOLD FOR SBP <140 11/28/22  Yes Elmira Celis MD   ondansetron (ZOFRAN-ODT) 8 MG TbDL DISSOLVE 1 tablet (8 mg total) by mouth every 8 (eight) hours as needed (nausea). 11/8/22  Yes Abby Mccord DNP   predniSONE (DELTASONE) 5 MG tablet From 11/6 to 11/12 take 20mg by mouth once daily;  From 11/13 to 11/19 take 15mg daily;  From 11/20 to 11/26 take 10mg daily;  From 11/27/22 start 5mg daily thereafter  Patient taking differently: Take 5 mg by mouth once daily. From 11/6 to 11/12 take 20mg by mouth once daily;  From 11/13 to 11/19 take 15mg daily;  From 11/20 to 11/26 take 10mg daily;  From 11/27/22 start 5mg daily thereafter 11/3/22  Yes Kumar Rodriges Jr., MD   rosuvastatin (CRESTOR) 10 MG tablet Take 1 tablet (10 mg total) by mouth every evening. 11/8/22  Yes Kumar Rodriges Jr., MD   sodium bicarbonate 650 MG tablet Take 2 tablets (1,300 mg total) by mouth 3 (three) times daily. 11/14/22  Yes Tammie Broussard DO   sulfamethoxazole-trimethoprim 400-80mg (BACTRIM,SEPTRA) 400-80 mg per tablet Take 1 tablet by mouth once daily. Stop 5/2/2023 11/3/22 5/2/23 Yes Kumar Rodriges Jr., MD   tacrolimus (PROGRAF) 1 MG Cap Take 6 capsules (6 mg total) by mouth every morning AND 6 capsules (6 mg total) every evening. 12/1/22  Yes Tammie Broussard DO   valGANciclovir (VALCYTE) 450 mg Tab Take  1 tablet (450 mg total) by mouth once daily. Stop 2/2/2023 11/28/22  Yes Elmira Celis MD   venlafaxine (EFFEXOR XR) 37.5 MG 24 hr capsule Take 1 capsule (37.5 mg total) by mouth once daily. 11/8/22 11/8/23 Yes Kumar Rodriges Jr., MD   famotidine (PEPCID) 20 MG tablet Take 1 tablet (20 mg total) by mouth once daily. Stop 11/27/22 for 19 days 11/8/22 11/27/22  Kumar Rodriges Jr., MD   k phos di & mono-sod phos mono (K-PHOS-NEUTRAL) 250 mg Tab Take 1 tablet by mouth 2 (two) times a day. 12/1/22 12/1/23  Tammie Broussard DO   multivitamin (THERAGRAN) per tablet Take 1 tablet by mouth once daily. 11/8/22   Kumar Rodriges Jr., MD   mycophenolate (CELLCEPT) 250 mg Cap Take 2 capsules (500 mg total) by mouth 2 (two) times daily. RESTART 12/6/22 11/30/22   Tammie Broussard DO   promethazine (PHENERGAN) 12.5 MG Tab Take 1 tablet (12.5 mg total) by mouth every 6 (six) hours as needed (nausea). 11/30/22   Tammie Broussard DO   promethazine (PHENERGAN) 25 MG suppository Place 1 suppository (25 mg total) rectally every 6 (six) hours as needed for Nausea. 11/30/22   Tammie Broussard DO     Scheduled Meds:    atorvastatin  40 mg Oral Daily    ganciclovir (CYTOVENE) IVPB  0.625 mg/kg Intravenous Q24H    heparin (porcine)  5,000 Units Subcutaneous Q8H    micafungin (MYCAMINE) IVPB  100 mg Intravenous Q24H    mupirocin   Nasal BID    piperacillin-tazobactam (ZOSYN) IVPB  4.5 g Intravenous Q8H    predniSONE  5 mg Oral Daily    sulfamethoxazole-trimethoprim 400-80mg  1 tablet Oral Every Mon, Wed, Fri    tacrolimus  2 mg Oral BID    venlafaxine  37.5 mg Oral Daily     Continuous Infusions:    sodium chloride 0.9% 50 mL/hr at 12/07/22 0959     PRN Meds:sodium chloride 0.9%, acetaminophen, dextrose 10%, dextrose 10%, glucagon (human recombinant), glucose, glucose, insulin aspart U-100, morphine, ondansetron, promethazine (PHENERGAN) IVPB, sodium chloride 0.9%, traMADoL  Anticoagulants/Antiplatelets: Heparin    Allergies:  Review of patient's allergies indicates:  No Known Allergies  Sedation Hx: have not been any systemic reactions    Labs:  No results for input(s): INR in the last 168 hours.    Invalid input(s):  PT,  PTT    Recent Labs   Lab 12/07/22  0601   WBC 10.47   HGB 9.7*   HCT 30.3*   MCV 92   *      Recent Labs   Lab 12/01/22  0909 12/01/22  1630 12/04/22  1814 12/04/22  2044 12/07/22  0601      < > 838*  838*   < > 91   *   < > 139  139   < > 140   K 4.2   < > 2.6*  2.6*   < > 3.7      < > 89*  89*   < > 109   CO2 22*   < > 40*  40*   < > 20*   BUN 19   < > 17  17   < > 29*   CREATININE 2.8*   < > 3.2*  3.2*   < > 3.4*   CALCIUM 10.1   < > 6.9*  6.9*   < > 8.9   MG  --    < >  --    < > 1.9   ALT 10   < > 15  --   --    AST 10   < > 15  --   --    ALBUMIN 3.2*  3.2*   < > 2.0*  2.0*   < > 2.2*   BILITOT 0.3   < > 0.4  --   --    BILIDIR 0.2  --   --   --   --     < > = values in this interval not displayed.         Vitals:  Temp: 98.3 °F (36.8 °C) (12/07/22 1103)  Pulse: 104 (12/07/22 1103)  Resp: 17 (12/07/22 0945)  BP: (!) 140/78 (12/07/22 1103)  SpO2: 99 % (12/07/22 1103)     Physical Exam:  ASA: 2  Mallampati: 2    General: no acute distress  Mental Status: alert and oriented to person, place and time  HEENT: normocephalic, atraumatic  Chest: unlabored breathing  Heart: regular heart rate  Abdomen: nondistended  Extremity: moves all extremities    Plan:  1. Kidney transplant biopsy tentatively on 12/8.   2. NPO at midnight.  3. Hold anticoagulation.      Sedation Plan: moderate    Cristinesa YEHUDA Mcknight MD  PGY-5  Pager: [phone]

## 2022-12-07 NOTE — CARE UPDATE
BG goal 140-180    -A1C   Lab Results   Component Value Date    HGBA1C 11.1 (H) 11/02/2022       -HOME REGIMEN: none since pancreas transplant on 11/3/22    -INPATIENT REGIMEN: Novolog Correction Scale     -GLUCOSE TREND FOR THE PAST 24HRS:      -NO HYPOGYCEMIAS NOTED     -TOLERATING 50% OF PO DIET     -Diet: Diet renal  Diet NPO      -Steroids - Prednisone 5 mg daily     -Tube Feeds -  N/A       Remains in TSU. BG within goal ranges without insulin requirements.       Plan:  -Continue Low Dose Correction Scale  -BG monitoring ac/hs    Discharge planning: likely no needs    Endocrine to continue to follow    ** Please call Endocrine for any BG related issues **        Endocrine will likely sign off soon given patient has not needed insulin in > 24 hrs while tolerating diet.

## 2022-12-07 NOTE — PHYSICIAN QUERY
PT Name: Isabela Read  MR #: [id number]     DOCUMENTATION CLARIFICATION     CDS/: Tracy Ortiz RN             Contact information: [email or web address]  This form is a permanent document in the medical record.     Query Date: December 7, 2022    By submitting this query, we are merely seeking further clarification of documentation.  Please utilize your independent clinical judgment when addressing the question(s) below.  The Medical Record contains the following:  Indicators Supporting Clinical Findings Location in Medical Record   x HR         RR          BP        Temp Vital Signs (24h Range):  Temp:  [98.2 °F (36.8 °C)-101.7 °F (38.7 °C)] 98.2 °F (36.8 °C)  Pulse:  [109-131] 109  Resp:  [18-20] 18  SpO2:  [98 %-100 %] 98 %  BP: (132-153)/(70-83)       Vital Signs (24h Range):  Temp:  [98.1 °F (36.7 °C)-100.6 °F (38.1 °C)] 99.6 °F (37.6 °C)  Pulse:  [] 128  Resp:  [18-20] 18  SpO2:  [97 %-100 %] 97 %  BP: ()/(53-96) 119/65 H&P 11/25       Transplant Kidney              Consult 11/26        Infectious Diseases             x Lactic Acid          Procalcitonin Lactate, Francisco J 1.0 0.7    Lab 11/25       x WBC           Bands          CRP    WBC 11.01 9.26 8.17 7.30      Labs 11/25- 11/28       x Culture(s) Blood Culture  No Growth to date      Urine Culture, Routine   Multiple organisms isolated. None in predominance.    Repeat if clinically necessary.    Culture 11/25        Culture  11/25      AMS, Confusion, LOC, etc.      Organ Dysfunction/Failure     x Bacteremia or Sepsis / Septic * Sepsis  - Admit with fever, tachycardia, dirty UA, n/v 11/25  - Cont IVF and BS abx  - Infectious workup in process  - Kidney and Panc US pending  - Monitor H&P 11/25       Transplant Kidney    Known or Suspected Source of Infection documented      (Failed) Outpatient Treatment      Medication     x Treatment Acute cystitis without hematuria  - Patient with dysuria earlier this week, UA from 11/23  with >100 WBC, +3 leuks. However cx with no growth  - Pt prescribed Cipro outpt 11/23  - Presents to ED 11/25 with fevers/chills, concern for worsening UTI vs pyelo  - STAT Kidney US  - IV cefepime/vanc  - Infectious work-up  - IVF hydration        Ucx  without predominant organism but patient subjectively improved with antibiotics.  - plan to treat prolonged course for presumptive pyelo. Continue cefepime while admitted, on discharge can de-escalate to cipro to complete total 14 days of antibiotic therapy (per patient already has Rx for cipro at home - please give additional 7 days  of medication).  - can stop vancomycin H&P 11/25       Transplant Kidney                        Progress Note 11/27       Infectious Disease    Other          Provider, please clarify the Sepsis diagnosis associated with above clinical findings.    [ x  ] Sepsis Ruled In     [   ] Sepsis Ruled Out     [  ] Clinically Undetermined         Please document in your progress notes daily for the duration of treatment until resolved and include in your discharge summary.

## 2022-12-07 NOTE — PROGRESS NOTES
[facility]  Kidney Transplant  Progress Note      Reason for Follow-up: Reassessment of Kidney, Pancreas Transplant - 11/3/2022  (#1) recipient and management of immunosuppression.    ORGAN:  RIGHT KIDNEY   Donor Type:  Donation after Brain Death       Subjective:   History of Present Illness:  Ms. Read is a 27 y.o.  female with ESRD secondary to diabetic nephropathy and HTN, now s/p SPK 11/3/22 (Thymo induction, CMV D-/R+). Her post-op course was complicated by urinary retention requiring Moncada replacement, now resolved. She was recently admitted to the hospital and discharged 11/29/22 after presenting with nausea/vomiting, GEORGE, and had fever. Infectious work up performed. Urine cx had +multiple organisms none in predominance. Kidney US with edema/possible pyelo, ID consulted. She was initially treated with broad spectrum antibiotics. She was discharged on PO cipro x 7 days at [place]. She now presents to the ED with complaints of ongoing nausea/vomiting, decreased appetite, intermittent abdominal pain. She had ureteral stent removed outpatient yesterday 11/30/22. She was in infusion center today to give IVF but had episode of vomiting so she was sent to the ED. She denies dysuria/urgency. She denies history of gastroparesis (has had gastric emptying study in 2020 that was normal). In ED, she is noted to be tachycardic in the 130s, blood pressure 90s, along with low grade fever 100.4. EKG shows sinus tachycardia. Creatinine remains elevated above baseline. She is receiving 1L LR bolus in ED. Plan for repeat infectious work up, IVF hydration, IV antibiotics, kidney US, and CT A/P. Will also check CMV PCR.    Ms. Read is a 27 y.o. year old female who is status post Kidney, Pancreas Transplant - 11/3/2022  (#1).    Her maintenance immunosuppression consists of:   Immunosuppressants (From admission, onward)      Start     Stop Route Frequency Ordered    12/07/22 1000  tacrolimus  capsule 2 mg         -- Oral 2 times daily 12/07/22 0929            Hospital Course:  Patient admitted from the ED with n/v, GEORGE, and abdominal pain (had ureteral stent removed outpatient the day before). Pancreas US satisfactory. Infectious work up revealed UTI for for E. Faecium & Diphtheroids. Blood cxs ngtd. Continued to spike fever despite IV abx. ID consulted. UTI being treated with Zosyn/luis carlos. Dc in place for mild-mod hydro on US that appeared improved on repeat imaging but on CT the bladder was full despite proper dc placement. Urology upsized dc at bedside. Cr also remains elevated above baseline. DSA's undetected. Plan for kidney biopsy 12/8. Ct A/P obtained to evaluate patients abdominal pain. Showed possible infectious colitis, typhlitis or nonspecific colopathy. C diff PCR negative. CMV PCR negative. GI pathogens pending. Pt c/o worsening diarrhea so GI was consulted, no plan to scope at this time. Lastly, heme/onc consulted for high ferritin noted during anemia workup.     Interval history: No acute events overnight. Patient doing okay. Diarrhea improving. Fever curve improved. Remains on Zosyn and Luis Carlos. Pancreas US reviewed by surgeon, overall unremarkable. Given fevers and diarrhea and CT results of possible colitis, GI consulted. Do not plan to scope at this time. GI pathogen panel is pending. Creatinine remains elevated, plan for kidney biopsy on tomorrow 12/8/22. Hematology was consulted for a high ferritin level found during the work-up of anemia. Hyperferritinemia most likely in the setting of sepsis and inflammation, and is improving. H score 68 points, with <1% probability of Hemophagocytic syndrome. Plan to repeat ferritin one more time in the am. Will continue to monitor.       Past Medical, Surgical, Family, and Social History:   Unchanged from H&P.    Scheduled Meds:   atorvastatin  40 mg Oral Daily    ganciclovir (CYTOVENE) IVPB  0.625 mg/kg Intravenous Q24H    heparin  (porcine)  5,000 Units Subcutaneous Q8H    micafungin (MYCAMINE) IVPB  100 mg Intravenous Q24H    mupirocin   Nasal BID    piperacillin-tazobactam (ZOSYN) IVPB  4.5 g Intravenous Q8H    predniSONE  5 mg Oral Daily    sulfamethoxazole-trimethoprim 400-80mg  1 tablet Oral Every Mon, Wed, Fri    tacrolimus  2 mg Oral BID    venlafaxine  37.5 mg Oral Daily     Continuous Infusions:   sodium chloride 0.9% 50 mL/hr at 12/07/22 0959     PRN Meds:sodium chloride 0.9%, acetaminophen, dextrose 10%, dextrose 10%, glucagon (human recombinant), glucose, glucose, insulin aspart U-100, morphine, ondansetron, promethazine (PHENERGAN) IVPB, sodium chloride 0.9%, traMADoL    Intake/Output - Last 3 Shifts         12/05 0700  12/06 0659 12/06 0700 12/07 0659 12/07 0700 12/08 0659    P.O. 505 870     I.V. (mL/kg) 1923.5 (30.9) 1145 (17.7) 102.5 (1.6)    IV Piggyback 841.6 978.7 34.2    Total Intake(mL/kg) 3270.1 (52.6) 2993.8 (46.3) 136.7 (2.1)    Urine (mL/kg/hr) 1330 (0.9) 1350 (0.9) 480 (1.3)    Emesis/NG output       Stool 0 0     Total Output 1330 1350 480    Net +1940.1 +1643.8 -343.3           Stool Occurrence 4 x 5 x              Review of Systems   Constitutional:  Positive for activity change, appetite change (decreased appetite) and fatigue. Negative for chills and fever (afebrile past 24 hrs).   HENT: Negative.     Eyes: Negative.    Respiratory:  Negative for shortness of breath.    Cardiovascular:  Negative for chest pain and leg swelling.   Gastrointestinal:  Positive for diarrhea (improving). Negative for abdominal distention, constipation, nausea and vomiting.   Endocrine: Negative.    Genitourinary:  Negative for dysuria, hematuria and urgency.   Musculoskeletal: Negative.    Skin:  Positive for wound.   Allergic/Immunologic: Positive for immunocompromised state.   Neurological:  Positive for weakness. Negative for dizziness and light-headedness.   Hematological: Negative.    Psychiatric/Behavioral:  Negative  "for agitation and confusion. The patient is not nervous/anxious.     Objective:     Vital Signs (Most Recent):  Temp: 98.3 °F (36.8 °C) (12/07/22 1103)  Pulse: 104 (12/07/22 1152)  Resp: 17 (12/07/22 0945)  BP: (!) 140/78 (12/07/22 1103)  SpO2: 99 % (12/07/22 1103)   Vital Signs (24h Range):  Temp:  [98 °F (36.7 °C)-98.4 °F (36.9 °C)] 98.3 °F (36.8 °C)  Pulse:  [] 104  Resp:  [16-17] 17  SpO2:  [98 %-100 %] 99 %  BP: (104-140)/(56-78) 140/78     Weight: 64.6 kg (142 lb 6.7 oz)  Height: 5' 4" (162.6 cm)  Body mass index is 24.45 kg/m².    Physical Exam  Vitals and nursing note reviewed.   Constitutional:       General: She is not in acute distress.     Appearance: She is ill-appearing.   HENT:      Head: Normocephalic.      Nose: Nose normal.   Cardiovascular:      Rate and Rhythm: Regular rhythm. Tachycardia present.      Heart sounds: No murmur heard.    No gallop.   Pulmonary:      Effort: Pulmonary effort is normal.      Breath sounds: No wheezing or rales.   Abdominal:      General: There is no distension.      Tenderness: There is abdominal tenderness. There is no guarding or rebound.   Musculoskeletal:         General: No tenderness.      Cervical back: Normal range of motion.      Right lower leg: No edema.      Left lower leg: No edema.   Skin:     General: Skin is warm.   Neurological:      Mental Status: She is alert and oriented to person, place, and time.      Motor: Weakness present.   Psychiatric:         Mood and Affect: Mood normal.         Behavior: Behavior normal.         Thought Content: Thought content normal.         Judgment: Judgment normal.       Laboratory:  CBC:   Recent Labs   Lab 12/05/22  0536 12/06/22  1020 12/07/22  0601   WBC 19.54* 13.76* 10.47   RBC 2.88* 3.08* 3.29*   HGB 8.9* 9.2* 9.7*   HCT 26.5* 27.9* 30.3*    491* 558*   MCV 92 91 92   MCH 30.9 29.9 29.5   MCHC 33.6 33.0 32.0     BMP:   Recent Labs   Lab 12/06/22  1020 12/06/22  1803 12/07/22  0601    100 " 91    140 140   K 2.8* 3.4* 3.7    105 109   CO2 21* 23 20*   BUN 24* 24* 29*   CREATININE 3.0* 3.4* 3.4*   CALCIUM 9.0 9.2 8.9     Labs within the past 24 hours have been reviewed.    Diagnostic Results:  US - Kidney: Results for orders placed during the hospital encounter of 12/01/22    US Transplant Kidney With Doppler    Narrative  EXAMINATION:  US TRANSPLANT KIDNEY WITH DOPPLER    CLINICAL HISTORY:  s/p SPK. increase in Cr, severe abdominal pain;    TECHNIQUE:  Transplant renal ultrasound of the right lower quadrant with color flow doppler and duplex analysis.    COMPARISON:  Renal transplant ultrasound 12/03/2022.  12/01/2022.    FINDINGS:  Status post right lower quadrant renal transplant on 11/03/2022.  The renal transplant measures 12.5 cm and demonstrates no focal parenchymal abnormality. Minimal transplant hydronephrosis.  No peritransplant fluid.    Renal arterial resistive indices are as follows: Interlobar 0.77 (previously 0.81), segmental Up 0.75 (previously 0.72), segmental Mid 0.77 (previously 0.78), segmental Low 0.76 (previously 0.80).  There is no evidence of a tardus parvus waveform. The maximum velocity in the main renal artery is 236 (previously 187) cm/sec.  Maximum velocity in the main renal vein is 139 (previously 115) centimeters/second.  RA/iliac ratio 0.97    The renal transplant venous system is unremarkable.    Bladder is decompressed around Moncada catheter.    Impression  Minimal transplant hydronephrosis.    Mild interval increased main renal artery and main renal vein peak systolic velocity compared to prior.  Recommend attention on follow-up.    Intraparenchymal resistive indices are improved compared to multiple prior exams.    Electronically signed by resident: Laura Sifuentes  Date:    12/04/2022  Time:    15:10    Electronically signed by: Zulma Anaya MD  Date:    12/04/2022  Time:    16:05    US - Pancreas: Results for orders placed during the hospital  encounter of 12/01/22    US Pancreas Transplant Post    Narrative  EXAMINATION:  US DOPPLER PANCREAS TRANSPLANT POST (XPD)    CLINICAL HISTORY:  s/p SPK; r/o pancreas infarct;    TECHNIQUE:  Grayscale, color flow, and spectral analysis was used to evaluate the pancreas allograft using transabdominal ultrasound technique.    COMPARISON:  Ultrasound from 11/25/2022    FINDINGS:  Pancreatic allograft demonstrates heterogeneous area near the pancreatic head neck, not significantly changed compared to prior examination.  There are no peripancreatic fluid collections.    Velocities:    Conduit:216 cm/s    Splenic artery:96 cm/s    Splenic vein:14 cm/s    Portal vein:109 cm/s    Superior mesenteric vein:40 cm/s    Iliac artery:204 cm/s    SMA of Y graft: 38 cm/s    Resistive indices range from 0.62-0.78.    Impression  Satisfactory Doppler evaluation of the pancreatic allograft.    Redemonstration of heterogeneous, hypoechoic area of the pancreatic head/neck with adjacent bowel.  Findings are nonspecific and continued follow-up recommended.      Electronically signed by: Jim Cedeño MD  Date:    12/05/2022  Time:    12:30    Assessment/Plan:     * Sepsis  - Blood cx x 2, ngtd  - UA showed 11 WBCs, moderate bacteria, and 8 hyaline casts  - Urine cx with E. Faecium & Diphtheroids  - LA 0.6  - D/w ID, changed cefepime to zosyn, added luis carlos, and stopped dapto  - Repeat urine and blood cxs ngtd  - CT AP noted interval development of short segment circumferential wall thickening of the fluid-filled cecum and proximal colon with surrounding stranding and edema, perhaps infectious colitis, typhlitis or nonspecific colopathy  - C diff PCR negative  - CMV negative  - GI pathogens panel pending  - GI consulted due to fevers, diarrhea, and possible colitis on CT scan      Fever  - See SIRS      Nausea & vomiting  - Initial CT A/P without contrast unremarkable   - Significant worsening on 12/4 with repeat KUS and CT. US benign, but  "CT appears to show urine in bladder despite dc  - Discussed with surgery  - Dc up sized by urology   - Improving      Status post pancreas transplantation  - Amylase/lipase WNL   - Pancreas US  satisfactory  - Repeat Pancreas US ordered to r/o infarct as source of pain/fever, satisfactory   - Monitor with daily labs      Pyelonephritis, acute  - With GEORGE  - UC now predominant for enterococcus, also diphtheroids present  - Cont zosyn/luis carlos  - Repeat urine cx ngtd      Long-term use of immunosuppressant medication  - Continue prograf and steroids. Monitor prograf level daily, monitor for toxic side effects, and adjust for therapeutic dose   - Hold cellcept for GI upset.      Prophylactic immunotherapy  - See long term use of immunosuppressant medication      At risk for opportunistic infections  - Cont OI prophylaxis per protocol.       Status post -donor kidney transplantation  - s/p SPK 11/3/22 for ESRD 2/2 DMI  - See, "GEORGE"      GEORGE (acute kidney injury)  - Cr 3.3 from 3.5  - DSA's undetected  - IR consulted for kidney biopsy   - Had ureteral stent removed 22  - Kidney US showed mild-mod hydro, dc placed  - Repeat US 12/3 with improved hydro and creatinine improved as well  - Dc upsized by urology due CT showing enlarged transplant kidney and distended bladder despite dc in adequate position.       Diarrhea  - Ct A/P obtained to evaluate patients abdominal pain. Showed possible infectious colitis, typhlitis or nonspecific colopathy  - C diff PCR negative  - CMV PCR negative  - GI pathogens pending  - GI consulted, no plan to scope at this time  - Diarrhea improving      Anemia due to chronic kidney disease  - H/H stable on AM labs  - currently menstruating  - given 1u prbc 12/3/22  - Parvo pending  - Hematology was consulted for a high ferritin level found during the work-up of anemia  - Hyperferritinemia most likely in the setting of sepsis and inflammation, and is " improving  - H score 68 points, with <1% probability of Hemophagocytic syndrome.   - Plan to repeat ferritin one more time in the am      Renovascular hypertension  - Hold antihypertensives for now as with hypotension from sepsis vs dehydration  - Assess daily and restart when appropriate    Discharge Planning: Not a candidate for d/c at this time.     Palmira Vivar PA-C  Kidney Transplant  [facility]

## 2022-12-07 NOTE — ASSESSMENT & PLAN NOTE
- With GEORGE  - UC now predominant for enterococcus, also diphtheroids present  - Cont zosyn/luis carlos  - Repeat urine cx ngtd

## 2022-12-07 NOTE — ASSESSMENT & PLAN NOTE
- Ct A/P obtained to evaluate patients abdominal pain. Showed possible infectious colitis, typhlitis or nonspecific colopathy  - C diff PCR negative  - CMV PCR negative  - GI pathogens pending  - GI consulted, no plan to scope at this time  - Diarrhea improving

## 2022-12-07 NOTE — SUBJECTIVE & OBJECTIVE
Subjective:   History of Present Illness:  Ms. Read is a 27 y.o.  female with ESRD secondary to diabetic nephropathy and HTN, now s/p SPK 11/3/22 (Thymo induction, CMV D-/R+). Her post-op course was complicated by urinary retention requiring Moncada replacement, now resolved. She was recently admitted to the hospital and discharged 11/29/22 after presenting with nausea/vomiting, GEORGE, and had fever. Infectious work up performed. Urine cx had +multiple organisms none in predominance. Kidney US with edema/possible pyelo, ID consulted. She was initially treated with broad spectrum antibiotics. She was discharged on PO cipro x 7 days at [place]. She now presents to the ED with complaints of ongoing nausea/vomiting, decreased appetite, intermittent abdominal pain. She had ureteral stent removed outpatient yesterday 11/30/22. She was in infusion center today to give IVF but had episode of vomiting so she was sent to the ED. She denies dysuria/urgency. She denies history of gastroparesis (has had gastric emptying study in 2020 that was normal). In ED, she is noted to be tachycardic in the 130s, blood pressure 90s, along with low grade fever 100.4. EKG shows sinus tachycardia. Creatinine remains elevated above baseline. She is receiving 1L LR bolus in ED. Plan for repeat infectious work up, IVF hydration, IV antibiotics, kidney US, and CT A/P. Will also check CMV PCR.    Ms. Read is a 27 y.o. year old female who is status post Kidney, Pancreas Transplant - 11/3/2022  (#1).    Her maintenance immunosuppression consists of:   Immunosuppressants (From admission, onward)      Start     Stop Route Frequency Ordered    12/07/22 1000  tacrolimus capsule 2 mg         -- Oral 2 times daily 12/07/22 0945            Hospital Course:  Patient admitted from the ED with n/v, GEORGE, and abdominal pain (had ureteral stent removed outpatient the day before). Pancreas US satisfactory. Infectious work up revealed UTI for for E.  Faecium & Diphtheroids. Blood cxs ngtd. Continued to spike fever despite IV abx. ID consulted. UTI being treated with Zosyn/luis carlos. Dc in place for mild-mod hydro on US that appeared improved on repeat imaging but on CT the bladder was full despite proper dc placement. Urology upsized dc at bedside. Cr also remains elevated above baseline. DSA's undetected. Plan for kidney biopsy 12/8. Ct A/P obtained to evaluate patients abdominal pain. Showed possible infectious colitis, typhlitis or nonspecific colopathy. C diff PCR negative. CMV PCR negative. GI pathogens pending. Pt c/o worsening diarrhea so GI was consulted, no plan to scope at this time. Lastly, heme/onc consulted for high ferritin noted during anemia workup.     Interval history: No acute events overnight. Patient doing okay. Diarrhea improving. Fever curve improved. Remains on Zosyn and Luis Carlos. Pancreas US reviewed by surgeon, overall unremarkable. Given fevers and diarrhea and CT results of possible colitis, GI consulted. Do not plan to scope at this time. GI pathogen panel is pending. Creatinine remains elevated, plan for kidney biopsy on tomorrow 12/8/22. Hematology was consulted for a high ferritin level found during the work-up of anemia. Hyperferritinemia most likely in the setting of sepsis and inflammation, and is improving. H score 68 points, with <1% probability of Hemophagocytic syndrome. Plan to repeat ferritin one more time in the am. Will continue to monitor.       Past Medical, Surgical, Family, and Social History:   Unchanged from H&P.    Scheduled Meds:   atorvastatin  40 mg Oral Daily    ganciclovir (CYTOVENE) IVPB  0.625 mg/kg Intravenous Q24H    heparin (porcine)  5,000 Units Subcutaneous Q8H    micafungin (MYCAMINE) IVPB  100 mg Intravenous Q24H    mupirocin   Nasal BID    piperacillin-tazobactam (ZOSYN) IVPB  4.5 g Intravenous Q8H    predniSONE  5 mg Oral Daily    sulfamethoxazole-trimethoprim 400-80mg  1 tablet Oral Every Mon,  Wed, Fri    tacrolimus  2 mg Oral BID    venlafaxine  37.5 mg Oral Daily     Continuous Infusions:   sodium chloride 0.9% 50 mL/hr at 12/07/22 0959     PRN Meds:sodium chloride 0.9%, acetaminophen, dextrose 10%, dextrose 10%, glucagon (human recombinant), glucose, glucose, insulin aspart U-100, morphine, ondansetron, promethazine (PHENERGAN) IVPB, sodium chloride 0.9%, traMADoL    Intake/Output - Last 3 Shifts         12/05 0700 12/06 0659 12/06 0700 12/07 0659 12/07 0700 12/08 0659    P.O. 505 870     I.V. (mL/kg) 1923.5 (30.9) 1145 (17.7) 102.5 (1.6)    IV Piggyback 841.6 978.7 34.2    Total Intake(mL/kg) 3270.1 (52.6) 2993.8 (46.3) 136.7 (2.1)    Urine (mL/kg/hr) 1330 (0.9) 1350 (0.9) 480 (1.3)    Emesis/NG output       Stool 0 0     Total Output 1330 1350 480    Net +1940.1 +1643.8 -343.3           Stool Occurrence 4 x 5 x              Review of Systems   Constitutional:  Positive for activity change, appetite change (decreased appetite) and fatigue. Negative for chills and fever (afebrile past 24 hrs).   HENT: Negative.     Eyes: Negative.    Respiratory:  Negative for shortness of breath.    Cardiovascular:  Negative for chest pain and leg swelling.   Gastrointestinal:  Positive for diarrhea (improving). Negative for abdominal distention, constipation, nausea and vomiting.   Endocrine: Negative.    Genitourinary:  Negative for dysuria, hematuria and urgency.   Musculoskeletal: Negative.    Skin:  Positive for wound.   Allergic/Immunologic: Positive for immunocompromised state.   Neurological:  Positive for weakness. Negative for dizziness and light-headedness.   Hematological: Negative.    Psychiatric/Behavioral:  Negative for agitation and confusion. The patient is not nervous/anxious.     Objective:     Vital Signs (Most Recent):  Temp: 98.3 °F (36.8 °C) (12/07/22 1103)  Pulse: 104 (12/07/22 1152)  Resp: 17 (12/07/22 0945)  BP: (!) 140/78 (12/07/22 1103)  SpO2: 99 % (12/07/22 1103)   Vital Signs (24h  "Range):  Temp:  [98 °F (36.7 °C)-98.4 °F (36.9 °C)] 98.3 °F (36.8 °C)  Pulse:  [] 104  Resp:  [16-17] 17  SpO2:  [98 %-100 %] 99 %  BP: (104-140)/(56-78) 140/78     Weight: 64.6 kg (142 lb 6.7 oz)  Height: 5' 4" (162.6 cm)  Body mass index is 24.45 kg/m².    Physical Exam  Vitals and nursing note reviewed.   Constitutional:       General: She is not in acute distress.     Appearance: She is ill-appearing.   HENT:      Head: Normocephalic.      Nose: Nose normal.   Cardiovascular:      Rate and Rhythm: Regular rhythm. Tachycardia present.      Heart sounds: No murmur heard.    No gallop.   Pulmonary:      Effort: Pulmonary effort is normal.      Breath sounds: No wheezing or rales.   Abdominal:      General: There is no distension.      Tenderness: There is abdominal tenderness. There is no guarding or rebound.   Musculoskeletal:         General: No tenderness.      Cervical back: Normal range of motion.      Right lower leg: No edema.      Left lower leg: No edema.   Skin:     General: Skin is warm.   Neurological:      Mental Status: She is alert and oriented to person, place, and time.      Motor: Weakness present.   Psychiatric:         Mood and Affect: Mood normal.         Behavior: Behavior normal.         Thought Content: Thought content normal.         Judgment: Judgment normal.       Laboratory:  CBC:   Recent Labs   Lab 12/05/22  0536 12/06/22  1020 12/07/22  0601   WBC 19.54* 13.76* 10.47   RBC 2.88* 3.08* 3.29*   HGB 8.9* 9.2* 9.7*   HCT 26.5* 27.9* 30.3*    491* 558*   MCV 92 91 92   MCH 30.9 29.9 29.5   MCHC 33.6 33.0 32.0     BMP:   Recent Labs   Lab 12/06/22  1020 12/06/22  1803 12/07/22  0601    100 91    140 140   K 2.8* 3.4* 3.7    105 109   CO2 21* 23 20*   BUN 24* 24* 29*   CREATININE 3.0* 3.4* 3.4*   CALCIUM 9.0 9.2 8.9     Labs within the past 24 hours have been reviewed.    Diagnostic Results:  US - Kidney: Results for orders placed during the hospital encounter " of 12/01/22    US Transplant Kidney With Doppler    Narrative  EXAMINATION:  US TRANSPLANT KIDNEY WITH DOPPLER    CLINICAL HISTORY:  s/p SPK. increase in Cr, severe abdominal pain;    TECHNIQUE:  Transplant renal ultrasound of the right lower quadrant with color flow doppler and duplex analysis.    COMPARISON:  Renal transplant ultrasound 12/03/2022.  12/01/2022.    FINDINGS:  Status post right lower quadrant renal transplant on 11/03/2022.  The renal transplant measures 12.5 cm and demonstrates no focal parenchymal abnormality. Minimal transplant hydronephrosis.  No peritransplant fluid.    Renal arterial resistive indices are as follows: Interlobar 0.77 (previously 0.81), segmental Up 0.75 (previously 0.72), segmental Mid 0.77 (previously 0.78), segmental Low 0.76 (previously 0.80).  There is no evidence of a tardus parvus waveform. The maximum velocity in the main renal artery is 236 (previously 187) cm/sec.  Maximum velocity in the main renal vein is 139 (previously 115) centimeters/second.  RA/iliac ratio 0.97    The renal transplant venous system is unremarkable.    Bladder is decompressed around Moncada catheter.    Impression  Minimal transplant hydronephrosis.    Mild interval increased main renal artery and main renal vein peak systolic velocity compared to prior.  Recommend attention on follow-up.    Intraparenchymal resistive indices are improved compared to multiple prior exams.    Electronically signed by resident: Laura Sifuentes  Date:    12/04/2022  Time:    15:10    Electronically signed by: Zulma Anaya MD  Date:    12/04/2022  Time:    16:05    US - Pancreas: Results for orders placed during the hospital encounter of 12/01/22    US Pancreas Transplant Post    Narrative  EXAMINATION:  US DOPPLER PANCREAS TRANSPLANT POST (XPD)    CLINICAL HISTORY:  s/p SPK; r/o pancreas infarct;    TECHNIQUE:  Grayscale, color flow, and spectral analysis was used to evaluate the pancreas allograft using  transabdominal ultrasound technique.    COMPARISON:  Ultrasound from 11/25/2022    FINDINGS:  Pancreatic allograft demonstrates heterogeneous area near the pancreatic head neck, not significantly changed compared to prior examination.  There are no peripancreatic fluid collections.    Velocities:    Conduit:216 cm/s    Splenic artery:96 cm/s    Splenic vein:14 cm/s    Portal vein:109 cm/s    Superior mesenteric vein:40 cm/s    Iliac artery:204 cm/s    SMA of Y graft: 38 cm/s    Resistive indices range from 0.62-0.78.    Impression  Satisfactory Doppler evaluation of the pancreatic allograft.    Redemonstration of heterogeneous, hypoechoic area of the pancreatic head/neck with adjacent bowel.  Findings are nonspecific and continued follow-up recommended.      Electronically signed by: Jim Cedeño MD  Date:    12/05/2022  Time:    12:30

## 2022-12-07 NOTE — ASSESSMENT & PLAN NOTE
- Amylase/lipase WNL   - Pancreas US 11/25 satisfactory  - Repeat Pancreas US ordered to r/o infarct as source of pain/fever, satisfactory   - Monitor with daily labs

## 2022-12-07 NOTE — ASSESSMENT & PLAN NOTE
- Initial CT A/P without contrast unremarkable   - Significant worsening on 12/4 with repeat KUS and CT. US benign, but CT appears to show urine in bladder despite dc  - Discussed with surgery  - Dc up sized by urology   - Improving

## 2022-12-07 NOTE — PROGRESS NOTES
Update:      informed transplant team during rounds that patient's mother requested letters to assist with a traffic ticket and jury duty.  and Palmira Vivar PA-C both agreed to speak about this with patient and her mother today.      remains available,   Makayla Bess LMSW

## 2022-12-07 NOTE — PLAN OF CARE
AAOX4  VVS, STANDING BP  ACCU CHECKS AC/HS , WNL   NO ACUTE DISTRESS NOTED OR VOICED THIS SHIFT   NPO AT MIDNIGHT FOR BX IN AM   CALL LIGHT WITH IN REACH AND VERBAL UNDERSTANDING NOTED TO CALL PRN

## 2022-12-07 NOTE — CONSULTS
Inpatient Radiology Consult Note    History of Present Illness:  Isabela Read is a 27 y.o. female with ESRD secondary to diabetic nephropathy and HTN, now s/p SPK 11/3/22. Her post-op course was complicated by urinary retention requiring Moncada replacement, now resolved. She presents with complaints of ongoing nausea/vomiting, decreased appetite, intermittent abdominal pain. Pt is currently admitted for infectious workup and GEORGE. Pt was recently admitted for similar complaints.  She had ureteral stent removed outpatient 11/30/22. 12/1 CT AP noted for air w/in allograft, perhaps related to interval removal of stent as per read. Creatinine has been elevated and has not down trended.   IR is consulted for kidney biopsy.      Plan:   1. Kidney transplant biopsy requested for 12/8.   2. NPO at midnight.  3. Hold anticoagulation.     Full H&P to follow.     Cristine Mcknight MD  Department of Radiology, PGY-5  Pager: [phone]     Currently saturating well on Room air without evidence of acute volume overload.   - TTE 9/18 not well visualized LV, severe Mitral stenosis, left atrial enlargement   - CORBIN 9/27 with estimated EF 30-35%  - continue sotalol with hold parameters  - continue entresto with hold parameters  - continue lasix with hold parameters  - Monitor and replete lytes, keep K>4, Mg>2.  - Strict Is/Os, daily weights. - ICD placed >20 years ago, 2/2 sustained VT  - Denied cardiac arrest, suspect sustained VT with pulse and had ICD placement  - Continue sotalol BID  -ICD interrogated at OSH, no shocks recorded, ICD functioning battery life 2.5 years; Vpaced <1%  - ICD extracted as above.   -Pt with apparent AICD fired x 1 overnight 9/25, pt asymptomatic, denied chest pain other than feeling shock. Will monitor. Currently sinus with minimal v-pacing on tele.   -Per ID pt cleared for subcutaneous ICD placement once >2 weeks from abx clearance. Discussed with EP. Plan to place new subcutaneous ICD early next week, likely monday.

## 2022-12-07 NOTE — CARE UPDATE
Care update:     27 years old female patient s/p kidney and pancreas transplant 11/03/2022 who is currently admitted for sepsis likely from colitis. She has chronic anemia which worsened this admission requiring a unit of PRBC with good response. Hematology was consulted for a high ferritin level found during the work-up of anemia.       Interval history:   - Stable overnight. No fever >24 hours   - Hgb trending up to 9.7 g/dl   - Ferritin is trending down to 13K   - Triglycerides normal, fibrinogen is not decreased   - Thrombocytosis; reactive.     Assessment:   - H score 68 points, with <1% probability of Hemophagocytic syndrome.   - hyperferritinemia most likely in the setting of sepsis and inflammation, and is improving.   - Reactive thrombocytosis.     No further work-up needed from hematology stand point. Please call or secure chat with any questions.     Hematology will sign off.      Hemant Naidu MD  Hematology/Oncology fellow. PGY VI

## 2022-12-08 ENCOUNTER — TELEPHONE (OUTPATIENT)
Dept: TRANSPLANT | Facility: CLINIC | Age: 27
End: 2022-12-08

## 2022-12-08 PROBLEM — R65.10 SIRS (SYSTEMIC INFLAMMATORY RESPONSE SYNDROME): Status: RESOLVED | Noted: 2022-12-06 | Resolved: 2022-12-08

## 2022-12-08 PROBLEM — R50.9 FEVER: Status: RESOLVED | Noted: 2022-12-01 | Resolved: 2022-12-08

## 2022-12-08 LAB
ALBUMIN SERPL BCP-MCNC: 2.3 G/DL (ref 3.5–5.2)
AMYLASE SERPL-CCNC: 44 U/L (ref 20–110)
ANION GAP SERPL CALC-SCNC: 8 MMOL/L (ref 8–16)
B-HCG UR QL: NEGATIVE
BASOPHILS # BLD AUTO: 0.02 K/UL (ref 0–0.2)
BASOPHILS NFR BLD: 0.2 % (ref 0–1.9)
BUN SERPL-MCNC: 22 MG/DL (ref 6–20)
CALCIUM SERPL-MCNC: 9.1 MG/DL (ref 8.7–10.5)
CHLORIDE SERPL-SCNC: 109 MMOL/L (ref 95–110)
CO2 SERPL-SCNC: 21 MMOL/L (ref 23–29)
CREAT SERPL-MCNC: 2.8 MG/DL (ref 0.5–1.4)
DIFFERENTIAL METHOD: ABNORMAL
EOSINOPHIL # BLD AUTO: 0.1 K/UL (ref 0–0.5)
EOSINOPHIL NFR BLD: 0.7 % (ref 0–8)
ERYTHROCYTE [DISTWIDTH] IN BLOOD BY AUTOMATED COUNT: 15.7 % (ref 11.5–14.5)
EST. GFR  (NO RACE VARIABLE): 23 ML/MIN/1.73 M^2
FERRITIN SERPL-MCNC: 5709 NG/ML (ref 20–300)
GLUCOSE SERPL-MCNC: 93 MG/DL (ref 70–110)
HCT VFR BLD AUTO: 29.6 % (ref 37–48.5)
HGB BLD-MCNC: 9.5 G/DL (ref 12–16)
IMM GRANULOCYTES # BLD AUTO: 0.13 K/UL (ref 0–0.04)
IMM GRANULOCYTES NFR BLD AUTO: 1.2 % (ref 0–0.5)
LIPASE SERPL-CCNC: 12 U/L (ref 4–60)
LYMPHOCYTES # BLD AUTO: 0.4 K/UL (ref 1–4.8)
LYMPHOCYTES NFR BLD: 3.9 % (ref 18–48)
MAGNESIUM SERPL-MCNC: 1.6 MG/DL (ref 1.6–2.6)
MCH RBC QN AUTO: 29.9 PG (ref 27–31)
MCHC RBC AUTO-ENTMCNC: 32.1 G/DL (ref 32–36)
MCV RBC AUTO: 93 FL (ref 82–98)
MONOCYTES # BLD AUTO: 1 K/UL (ref 0.3–1)
MONOCYTES NFR BLD: 9.2 % (ref 4–15)
NEUTROPHILS # BLD AUTO: 8.9 K/UL (ref 1.8–7.7)
NEUTROPHILS NFR BLD: 84.8 % (ref 38–73)
NRBC BLD-RTO: 0 /100 WBC
PHOSPHATE SERPL-MCNC: 2.1 MG/DL (ref 2.7–4.5)
PLATELET # BLD AUTO: 616 K/UL (ref 150–450)
PMV BLD AUTO: 10 FL (ref 9.2–12.9)
POCT GLUCOSE: 116 MG/DL (ref 70–110)
POCT GLUCOSE: 96 MG/DL (ref 70–110)
POTASSIUM SERPL-SCNC: 3.4 MMOL/L (ref 3.5–5.1)
RBC # BLD AUTO: 3.18 M/UL (ref 4–5.4)
SODIUM SERPL-SCNC: 138 MMOL/L (ref 136–145)
TACROLIMUS BLD-MCNC: 7.3 NG/ML (ref 5–15)
WBC # BLD AUTO: 10.47 K/UL (ref 3.9–12.7)

## 2022-12-08 PROCEDURE — 20600001 HC STEP DOWN PRIVATE ROOM

## 2022-12-08 PROCEDURE — 80197 ASSAY OF TACROLIMUS: CPT | Performed by: INTERNAL MEDICINE

## 2022-12-08 PROCEDURE — 63600175 PHARM REV CODE 636 W HCPCS: Performed by: INTERNAL MEDICINE

## 2022-12-08 PROCEDURE — 99233 PR SUBSEQUENT HOSPITAL CARE,LEVL III: ICD-10-PCS | Mod: ,,, | Performed by: PHYSICIAN ASSISTANT

## 2022-12-08 PROCEDURE — 63600175 PHARM REV CODE 636 W HCPCS: Performed by: PHYSICIAN ASSISTANT

## 2022-12-08 PROCEDURE — 63600175 PHARM REV CODE 636 W HCPCS: Mod: JG | Performed by: STUDENT IN AN ORGANIZED HEALTH CARE EDUCATION/TRAINING PROGRAM

## 2022-12-08 PROCEDURE — 82150 ASSAY OF AMYLASE: CPT | Performed by: INTERNAL MEDICINE

## 2022-12-08 PROCEDURE — 85025 COMPLETE CBC W/AUTO DIFF WBC: CPT | Performed by: INTERNAL MEDICINE

## 2022-12-08 PROCEDURE — 83735 ASSAY OF MAGNESIUM: CPT | Performed by: INTERNAL MEDICINE

## 2022-12-08 PROCEDURE — 81025 URINE PREGNANCY TEST: CPT | Performed by: INTERNAL MEDICINE

## 2022-12-08 PROCEDURE — 25000003 PHARM REV CODE 250: Performed by: PHYSICIAN ASSISTANT

## 2022-12-08 PROCEDURE — 99233 SBSQ HOSP IP/OBS HIGH 50: CPT | Mod: ,,, | Performed by: PHYSICIAN ASSISTANT

## 2022-12-08 PROCEDURE — 63600175 PHARM REV CODE 636 W HCPCS: Performed by: RADIOLOGY

## 2022-12-08 PROCEDURE — 36415 COLL VENOUS BLD VENIPUNCTURE: CPT | Performed by: PHYSICIAN ASSISTANT

## 2022-12-08 PROCEDURE — 25000003 PHARM REV CODE 250: Performed by: INTERNAL MEDICINE

## 2022-12-08 PROCEDURE — 80069 RENAL FUNCTION PANEL: CPT | Performed by: INTERNAL MEDICINE

## 2022-12-08 PROCEDURE — 82728 ASSAY OF FERRITIN: CPT | Performed by: PHYSICIAN ASSISTANT

## 2022-12-08 PROCEDURE — 25000003 PHARM REV CODE 250: Performed by: STUDENT IN AN ORGANIZED HEALTH CARE EDUCATION/TRAINING PROGRAM

## 2022-12-08 PROCEDURE — 83690 ASSAY OF LIPASE: CPT | Performed by: INTERNAL MEDICINE

## 2022-12-08 RX ORDER — HYDRALAZINE HYDROCHLORIDE 20 MG/ML
10 INJECTION INTRAMUSCULAR; INTRAVENOUS EVERY 6 HOURS PRN
Status: DISCONTINUED | OUTPATIENT
Start: 2022-12-08 | End: 2022-12-15 | Stop reason: HOSPADM

## 2022-12-08 RX ORDER — ACETAMINOPHEN 325 MG/1
650 TABLET ORAL EVERY 8 HOURS PRN
Status: DISCONTINUED | OUTPATIENT
Start: 2022-12-08 | End: 2022-12-15 | Stop reason: HOSPADM

## 2022-12-08 RX ORDER — TACROLIMUS 1 MG/1
1 CAPSULE ORAL ONCE
Status: COMPLETED | OUTPATIENT
Start: 2022-12-08 | End: 2022-12-08

## 2022-12-08 RX ORDER — LOPERAMIDE HYDROCHLORIDE 2 MG/1
4 CAPSULE ORAL ONCE
Status: COMPLETED | OUTPATIENT
Start: 2022-12-08 | End: 2022-12-08

## 2022-12-08 RX ORDER — HYDRALAZINE HYDROCHLORIDE 20 MG/ML
INJECTION INTRAMUSCULAR; INTRAVENOUS
Status: DISCONTINUED
Start: 2022-12-08 | End: 2022-12-08 | Stop reason: WASHOUT

## 2022-12-08 RX ORDER — FENTANYL CITRATE 50 UG/ML
INJECTION, SOLUTION INTRAMUSCULAR; INTRAVENOUS
Status: COMPLETED | OUTPATIENT
Start: 2022-12-08 | End: 2022-12-08

## 2022-12-08 RX ORDER — TACROLIMUS 1 MG/1
3 CAPSULE ORAL 2 TIMES DAILY
Status: DISCONTINUED | OUTPATIENT
Start: 2022-12-08 | End: 2022-12-10

## 2022-12-08 RX ORDER — MIDAZOLAM HYDROCHLORIDE 1 MG/ML
INJECTION INTRAMUSCULAR; INTRAVENOUS
Status: COMPLETED | OUTPATIENT
Start: 2022-12-08 | End: 2022-12-08

## 2022-12-08 RX ADMIN — ONDANSETRON 4 MG: 2 INJECTION INTRAMUSCULAR; INTRAVENOUS at 12:12

## 2022-12-08 RX ADMIN — MICAFUNGIN SODIUM 100 MG: 100 INJECTION, POWDER, LYOPHILIZED, FOR SOLUTION INTRAVENOUS at 12:12

## 2022-12-08 RX ADMIN — FENTANYL CITRATE 50 MCG: 50 INJECTION, SOLUTION INTRAMUSCULAR; INTRAVENOUS at 02:12

## 2022-12-08 RX ADMIN — FENTANYL CITRATE 50 MCG: 50 INJECTION, SOLUTION INTRAMUSCULAR; INTRAVENOUS at 03:12

## 2022-12-08 RX ADMIN — MIDAZOLAM HYDROCHLORIDE 1 MG: 1 INJECTION, SOLUTION INTRAMUSCULAR; INTRAVENOUS at 03:12

## 2022-12-08 RX ADMIN — GANCICLOVIR SODIUM 39 MG: 50 INJECTION, POWDER, LYOPHILIZED, FOR SOLUTION INTRAVENTRICULAR at 08:12

## 2022-12-08 RX ADMIN — PREDNISONE 5 MG: 5 TABLET ORAL at 09:12

## 2022-12-08 RX ADMIN — PIPERACILLIN SODIUM AND TAZOBACTAM SODIUM 4.5 G: 4; .5 INJECTION, POWDER, LYOPHILIZED, FOR SOLUTION INTRAVENOUS at 04:12

## 2022-12-08 RX ADMIN — LOPERAMIDE HYDROCHLORIDE 4 MG: 2 CAPSULE ORAL at 11:12

## 2022-12-08 RX ADMIN — TACROLIMUS 3 MG: 1 CAPSULE ORAL at 06:12

## 2022-12-08 RX ADMIN — PIPERACILLIN SODIUM AND TAZOBACTAM SODIUM 4.5 G: 4; .5 INJECTION, POWDER, LYOPHILIZED, FOR SOLUTION INTRAVENOUS at 12:12

## 2022-12-08 RX ADMIN — VENLAFAXINE HYDROCHLORIDE 37.5 MG: 37.5 CAPSULE, EXTENDED RELEASE ORAL at 06:12

## 2022-12-08 RX ADMIN — DESMOPRESSIN ACETATE 9.56 MCG: 4 SOLUTION INTRAVENOUS at 01:12

## 2022-12-08 RX ADMIN — MICAFUNGIN SODIUM 100 MG: 100 INJECTION, POWDER, LYOPHILIZED, FOR SOLUTION INTRAVENOUS at 11:12

## 2022-12-08 RX ADMIN — MUPIROCIN: 20 OINTMENT TOPICAL at 09:12

## 2022-12-08 RX ADMIN — MIDAZOLAM HYDROCHLORIDE 1 MG: 1 INJECTION, SOLUTION INTRAMUSCULAR; INTRAVENOUS at 02:12

## 2022-12-08 RX ADMIN — MUPIROCIN: 20 OINTMENT TOPICAL at 08:12

## 2022-12-08 RX ADMIN — TACROLIMUS 2 MG: 1 CAPSULE ORAL at 09:12

## 2022-12-08 RX ADMIN — TACROLIMUS 1 MG: 1 CAPSULE ORAL at 09:12

## 2022-12-08 RX ADMIN — ATORVASTATIN CALCIUM 40 MG: 40 TABLET, FILM COATED ORAL at 06:12

## 2022-12-08 NOTE — ASSESSMENT & PLAN NOTE
- Ct A/P obtained to evaluate patients abdominal pain. Showed possible infectious colitis, typhlitis or nonspecific colopathy  - C diff PCR negative  - CMV PCR negative  - GI pathogens pending  - GI consulted, no plan to scope at this time  - Will trial imodium per GI recs

## 2022-12-08 NOTE — PROGRESS NOTES
Update:      presented to patient's room during rounds. Patient laying in bed sleeping, patient's mother sitting on couch.  informed patient's mother that medical team has declined her request for letters. Patient's mother voiced understanding and denied additional needs. Patient denied needs at this time as well stating that she was tired and attempting to rest.      remains available,     Makayla Bess LMSW

## 2022-12-08 NOTE — ASSESSMENT & PLAN NOTE
- Cr remains elevated above BL  - DSA's undetected  - IR consulted for kidney biopsy today 12/8  - Had ureteral stent removed 11/30/22  - Kidney US showed mild-mod hydro, dc placed  - Repeat US 12/3 with improved hydro and creatinine improved as well  - Dc upsized by urology due CT showing enlarged transplant kidney and distended bladder despite dc in adequate position.

## 2022-12-08 NOTE — PLAN OF CARE
Pt to be transferred to MPU bed 4 via bed w/ RN & IR tech. Drowsy but easily aroused to verbal stimuli, VSS, denies pain.

## 2022-12-08 NOTE — CARE UPDATE
-Glucose Goal 140-180    -A1C:   Hemoglobin A1C   Date Value Ref Range Status   11/02/2022 11.1 (H) 4.0 - 5.6 % Final     Comment:     ADA Screening Guidelines:  5.7-6.4%  Consistent with prediabetes  >or=6.5%  Consistent with diabetes    High levels of fetal hemoglobin interfere with the HbA1C  assay. Heterozygous hemoglobin variants (HbS, HgC, etc)do  not significantly interfere with this assay.   However, presence of multiple variants may affect accuracy.            -HOME REGIMEN: none since pancreas transplant on 11/3/22     -INPATIENT REGIMEN: Novolog Correction Scale        -GLUCOSE TREND FOR THE PAST 24HRS:   Recent Labs   Lab 12/06/22  1324 12/06/22  1752 12/06/22  2221 12/07/22  0821 12/07/22  1735 12/07/22  2120   POCTGLUCOSE 96 97 104 91 101 109         -NO HYPOGYCEMIAS NOTED     - Diet  Diet NPO    -Steroids - Prednisone 5 mg daily    -Tube Feeds - N/A    Sugars remain stable. Not requiring insulin.  Will continue to monitor pt and see how sugars do with diet.    Plan:   - -Continue Novolog correction dose with ISF 50 starting at 200    - POCT Glucose before meals and at bedtime  - Hypoglycemia protocol in place      ** Please notify Endocrine for any change and/or advance in diet**  ** Please call Endocrine for any BG related issues **     Discharge Planning:   Do not suspect pt will need dm meds at discharge

## 2022-12-08 NOTE — PROGRESS NOTES
[facility]  Kidney Transplant  Progress Note      Reason for Follow-up: Reassessment of Kidney, Pancreas Transplant - 11/3/2022  (#1) recipient and management of immunosuppression.    ORGAN:  RIGHT KIDNEY   Donor Type:  Donation after Brain Death   PHS Increased Risk: no   Cold Ischemia: ~7 hrs   Induction Medications: Thymoglobulin      Subjective:   History of Present Illness:  Ms. Read is a 27 y.o.  female with ESRD secondary to diabetic nephropathy and HTN, now s/p SPK 11/3/22 (Thymo induction, CMV D-/R+). Her post-op course was complicated by urinary retention requiring Moncada replacement, now resolved. She was recently admitted to the hospital and discharged 11/29/22 after presenting with nausea/vomiting, GEORGE, and had fever. Infectious work up performed. Urine cx had +multiple organisms none in predominance. Kidney US with edema/possible pyelo, ID consulted. She was initially treated with broad spectrum antibiotics. She was discharged on PO cipro x 7 days at [facility]. She now presents to the ED with complaints of ongoing nausea/vomiting, decreased appetite, intermittent abdominal pain. She had ureteral stent removed outpatient yesterday 11/30/22. She was in infusion center today to give IVF but had episode of vomiting so she was sent to the ED. She denies dysuria/urgency. She denies history of gastroparesis (has had gastric emptying study in 2020 that was normal). In ED, she is noted to be tachycardic in the 130s, blood pressure 90s, along with low grade fever 100.4. EKG shows sinus tachycardia. Creatinine remains elevated above baseline. She is receiving 1L LR bolus in ED. Plan for repeat infectious work up, IVF hydration, IV antibiotics, kidney US, and CT A/P. Will also check CMV PCR.    Ms. Read is a 27 y.o. year old female who is status post Kidney, Pancreas Transplant - 11/3/2022  (#1).    Her maintenance immunosuppression consists of:   Immunosuppressants (From admission,  onward)      Start     Stop Route Frequency Ordered    12/08/22 1800  tacrolimus capsule 3 mg         -- Oral 2 times daily 12/08/22 0912            Hospital Course:  Patient admitted from the ED with n/v, GEORGE, and abdominal pain (had ureteral stent removed outpatient the day before). Pancreas US satisfactory. Infectious work up revealed UTI for for E. Faecium & Diphtheroids. Blood cxs ngtd. Continued to spike fever despite IV abx. ID consulted. UTI being treated with Zosyn/luis carlos. Dc in place for mild-mod hydro on US that appeared improved on repeat imaging but on CT the bladder was full despite proper dc placement. Urology upsized dc at bedside. Cr also remains elevated above baseline. DSA's undetected. Plan for kidney biopsy 12/8. Ct A/P obtained to evaluate patients abdominal pain. Showed possible infectious colitis, typhlitis or nonspecific colopathy. C diff PCR negative. CMV PCR negative. GI pathogens pending. Pt c/o worsening diarrhea so GI was consulted, no plan to scope at this time. Lastly, hematology was consulted for a high ferritin level found during the work-up of anemia. Hyperferritinemia most likely in the setting of sepsis and inflammation. H score 68 points, with <1% probability of Hemophagocytic syndrome. Ferritin trending down.    Interval history: No acute events overnight. Cr improved slightly but remains above BL. Plan for kidney bx with IR today. Pt reports episodes of diarrhea are increasing again. Spoke with GI, want to see how pt does with imodium. Cont to hold off scoping at this time. GI pathogen panel is pending. Remains afebrile. Cont Zosyn and Luis Carlos, EOT 12/10. Hyperferritinemia most likely in the setting of sepsis and inflammation, continues to trend down. Will continue to monitor.       Past Medical, Surgical, Family, and Social History:   Unchanged from H&P.    Scheduled Meds:   atorvastatin  40 mg Oral Daily    ganciclovir (CYTOVENE) IVPB  0.625 mg/kg Intravenous Q24H     heparin (porcine)  5,000 Units Subcutaneous Q8H    micafungin (MYCAMINE) IVPB  100 mg Intravenous Q24H    mupirocin   Nasal BID    piperacillin-tazobactam (ZOSYN) IVPB  4.5 g Intravenous Q8H    predniSONE  5 mg Oral Daily    sulfamethoxazole-trimethoprim 400-80mg  1 tablet Oral Every Mon, Wed, Fri    tacrolimus  3 mg Oral BID    venlafaxine  37.5 mg Oral Daily     Continuous Infusions:   sodium chloride 0.9% 50 mL/hr at 12/08/22 0700     PRN Meds:sodium chloride 0.9%, acetaminophen, dextrose 10%, dextrose 10%, glucagon (human recombinant), glucose, glucose, insulin aspart U-100, ondansetron, promethazine (PHENERGAN) IVPB, sodium chloride 0.9%    Intake/Output - Last 3 Shifts         12/06 0700  12/07 0659 12/07 0700 12/08 0659 12/08 0700 12/09 0659    P.O. 870 400     I.V. (mL/kg) 1145 (17.7) 1250.4 (19.6) 103.2 (1.6)    IV Piggyback 978.7 487.5 51.7    Total Intake(mL/kg) 2993.8 (46.3) 2137.9 (33.6) 154.9 (2.4)    Urine (mL/kg/hr) 1350 (0.9) 2545 (1.7) 850 (2.4)    Stool 0 0     Total Output 1350 2545 850    Net +1643.8 -407.1 -695.1           Stool Occurrence 5 x 2 x              Review of Systems   Constitutional:  Positive for activity change, appetite change (decreased appetite) and fatigue. Negative for chills and fever.   HENT: Negative.     Eyes: Negative.    Respiratory:  Negative for shortness of breath.    Cardiovascular:  Negative for chest pain and leg swelling.   Gastrointestinal:  Positive for diarrhea. Negative for abdominal distention, constipation, nausea and vomiting.   Endocrine: Negative.    Genitourinary:  Negative for dysuria and hematuria.   Musculoskeletal: Negative.    Skin:  Positive for wound.   Allergic/Immunologic: Positive for immunocompromised state.   Neurological:  Positive for weakness. Negative for dizziness and light-headedness.   Hematological: Negative.    Psychiatric/Behavioral:  Negative for agitation and confusion. The patient is not nervous/anxious.     Objective:  "    Vital Signs (Most Recent):  Temp: 98.9 °F (37.2 °C) (12/08/22 1149)  Pulse: (!) 128 (12/08/22 1149)  Resp: 18 (12/08/22 1149)  BP: 110/61 (12/08/22 1149)  SpO2: 100 % (12/08/22 1149)   Vital Signs (24h Range):  Temp:  [97.5 °F (36.4 °C)-98.9 °F (37.2 °C)] 98.9 °F (37.2 °C)  Pulse:  [] 128  Resp:  [16-18] 18  SpO2:  [91 %-100 %] 100 %  BP: (104-138)/(56-71) 110/61     Weight: 63.7 kg (140 lb 6.9 oz)  Height: 5' 4" (162.6 cm)  Body mass index is 24.11 kg/m².    Physical Exam  Vitals and nursing note reviewed.   Constitutional:       General: She is not in acute distress.     Appearance: She is ill-appearing.   HENT:      Head: Normocephalic.      Right Ear: External ear normal.      Left Ear: External ear normal.      Nose: Nose normal.   Eyes:      Conjunctiva/sclera: Conjunctivae normal.   Cardiovascular:      Rate and Rhythm: Regular rhythm. Tachycardia present.      Heart sounds: No murmur heard.    No gallop.   Pulmonary:      Effort: Pulmonary effort is normal.      Breath sounds: No wheezing or rales.   Abdominal:      General: There is no distension.      Tenderness: There is abdominal tenderness. There is no guarding or rebound.   Genitourinary:     Comments: Moncada in place   Musculoskeletal:         General: No tenderness.      Cervical back: Normal range of motion.      Right lower leg: No edema.      Left lower leg: No edema.   Skin:     General: Skin is warm.   Neurological:      Mental Status: She is alert and oriented to person, place, and time.      Motor: Weakness present.   Psychiatric:         Mood and Affect: Mood normal.         Behavior: Behavior normal.         Thought Content: Thought content normal.         Judgment: Judgment normal.       Laboratory:  CBC:   Recent Labs   Lab 12/06/22  1020 12/07/22  0601 12/08/22  0616   WBC 13.76* 10.47 10.47   RBC 3.08* 3.29* 3.18*   HGB 9.2* 9.7* 9.5*   HCT 27.9* 30.3* 29.6*   * 558* 616*   MCV 91 92 93   MCH 29.9 29.5 29.9   MCHC 33.0 " 32.0 32.1     BMP:   Recent Labs   Lab 12/06/22  1803 12/07/22  0601 12/08/22  0616    91 93    140 138   K 3.4* 3.7 3.4*    109 109   CO2 23 20* 21*   BUN 24* 29* 22*   CREATININE 3.4* 3.4* 2.8*   CALCIUM 9.2 8.9 9.1     Labs within the past 24 hours have been reviewed.    Diagnostic Results:  US - Kidney: Results for orders placed during the hospital encounter of 12/01/22    US Transplant Kidney With Doppler    Narrative  EXAMINATION:  US TRANSPLANT KIDNEY WITH DOPPLER    CLINICAL HISTORY:  s/p SPK. increase in Cr, severe abdominal pain;    TECHNIQUE:  Transplant renal ultrasound of the right lower quadrant with color flow doppler and duplex analysis.    COMPARISON:  Renal transplant ultrasound 12/03/2022.  12/01/2022.    FINDINGS:  Status post right lower quadrant renal transplant on 11/03/2022.  The renal transplant measures 12.5 cm and demonstrates no focal parenchymal abnormality. Minimal transplant hydronephrosis.  No peritransplant fluid.    Renal arterial resistive indices are as follows: Interlobar 0.77 (previously 0.81), segmental Up 0.75 (previously 0.72), segmental Mid 0.77 (previously 0.78), segmental Low 0.76 (previously 0.80).  There is no evidence of a tardus parvus waveform. The maximum velocity in the main renal artery is 236 (previously 187) cm/sec.  Maximum velocity in the main renal vein is 139 (previously 115) centimeters/second.  RA/iliac ratio 0.97    The renal transplant venous system is unremarkable.    Bladder is decompressed around Monacda catheter.    Impression  Minimal transplant hydronephrosis.    Mild interval increased main renal artery and main renal vein peak systolic velocity compared to prior.  Recommend attention on follow-up.    Intraparenchymal resistive indices are improved compared to multiple prior exams.    Electronically signed by resident: Laura Sifuentes  Date:    12/04/2022  Time:    15:10    Electronically signed by: Zulma Anaya  MD  Date:    12/04/2022  Time:    16:05    US - Pancreas: Results for orders placed during the hospital encounter of 12/01/22    US Pancreas Transplant Post    Narrative  EXAMINATION:  US DOPPLER PANCREAS TRANSPLANT POST (XPD)    CLINICAL HISTORY:  s/p SPK; r/o pancreas infarct;    TECHNIQUE:  Grayscale, color flow, and spectral analysis was used to evaluate the pancreas allograft using transabdominal ultrasound technique.    COMPARISON:  Ultrasound from 11/25/2022    FINDINGS:  Pancreatic allograft demonstrates heterogeneous area near the pancreatic head neck, not significantly changed compared to prior examination.  There are no peripancreatic fluid collections.    Velocities:    Conduit:216 cm/s    Splenic artery:96 cm/s    Splenic vein:14 cm/s    Portal vein:109 cm/s    Superior mesenteric vein:40 cm/s    Iliac artery:204 cm/s    SMA of Y graft: 38 cm/s    Resistive indices range from 0.62-0.78.    Impression  Satisfactory Doppler evaluation of the pancreatic allograft.    Redemonstration of heterogeneous, hypoechoic area of the pancreatic head/neck with adjacent bowel.  Findings are nonspecific and continued follow-up recommended.      Electronically signed by: Jim Cedeño MD  Date:    12/05/2022  Time:    12:30    Assessment/Plan:     * GEORGE (acute kidney injury)  - Cr remains elevated above BL  - DSA's undetected  - IR consulted for kidney biopsy today 12/8  - Had ureteral stent removed 11/30/22  - Kidney US showed mild-mod hydro, dc placed  - Repeat US 12/3 with improved hydro and creatinine improved as well  - Dc upsized by urology due CT showing enlarged transplant kidney and distended bladder despite dc in adequate position.       Other specified anemias  - Hematology was consulted for a high ferritin level found during the work-up of anemia  - Hyperferritinemia most likely in the setting of sepsis and inflammation  - H score 68 points, with <1% probability of Hemophagocytic syndrome  -  "Ferritin trending down.      Steroid-induced hyperglycemia  - Endocrine consulted. Appreciate their assistance.      Sepsis  - Blood cx x 2, ngtd  - UA showed 11 WBCs, moderate bacteria, and 8 hyaline casts  - Urine cx with E. Faecium & Diphtheroids  - LA 0.6  - D/w ID, changed cefepime to zosyn, added luis carlos, and stopped dapto  - Repeat urine and blood cxs ngtd  - CT AP noted interval development of short segment circumferential wall thickening of the fluid-filled cecum and proximal colon with surrounding stranding and edema, perhaps infectious colitis, typhlitis or nonspecific colopathy  - C diff PCR negative  - CMV negative  - GI pathogens panel pending  - GI consulted due to fevers, diarrhea, and possible colitis on CT scan      Nausea & vomiting  - Initial CT A/P without contrast unremarkable   - Significant worsening on  with repeat KUS and CT. US benign, but CT appears to show urine in bladder despite dc  - Discussed with surgery  - Dc up sized by urology   - Improving      Status post pancreas transplantation  - Amylase/lipase WNL   - Pancreas US  satisfactory  - Repeat Pancreas US ordered to r/o infarct as source of pain/fever, satisfactory   - Monitor with daily labs      Pyelonephritis, acute  - With GEORGE  - UC now predominant for enterococcus, also diphtheroids present  - Cont zosyn/luis carlos, EOT 12/10  - Repeat urine cx ngtd      Long-term use of immunosuppressant medication  - Continue prograf and steroids. Monitor prograf level daily, monitor for toxic side effects, and adjust for therapeutic dose   - Hold cellcept for GI upset.      Prophylactic immunotherapy  - See long term use of immunosuppressant medication      At risk for opportunistic infections  - Cont OI prophylaxis per protocol.         Status post -donor kidney transplantation  - s/p SPK 11/3/22 for ESRD 2/2 DMI  - See, "GEORGE"      Diarrhea  - Ct A/P obtained to evaluate patients abdominal pain. Showed possible infectious " colitis, typhlitis or nonspecific colopathy  - C diff PCR negative  - CMV PCR negative  - GI pathogens pending  - GI consulted, no plan to scope at this time  - Will trial imodium per GI recs      Anemia due to chronic kidney disease  - H/H stable on AM labs  - currently menstruating  - given 1u prbc 12/3/22  - Parvo pending  - Hematology was consulted for a high ferritin level found during the work-up of anemia  - Hyperferritinemia most likely in the setting of sepsis and inflammation, and is improving  - H score 68 points, with <1% probability of Hemophagocytic syndrome.       Renovascular hypertension  - Hold antihypertensives for now as with hypotension from sepsis vs dehydration  - Assess daily and restart when appropriate      Discharge Planning: Not a candidate for d/c at this time.     Palmira Vivar PA-C  Kidney Transplant  [facility]

## 2022-12-08 NOTE — SUBJECTIVE & OBJECTIVE
Subjective:   History of Present Illness:  Ms. Read is a 27 y.o.  female with ESRD secondary to diabetic nephropathy and HTN, now s/p SPK 11/3/22 (Thymo induction, CMV D-/R+). Her post-op course was complicated by urinary retention requiring Moncada replacement, now resolved. She was recently admitted to the hospital and discharged 11/29/22 after presenting with nausea/vomiting, GEORGE, and had fever. Infectious work up performed. Urine cx had +multiple organisms none in predominance. Kidney US with edema/possible pyelo, ID consulted. She was initially treated with broad spectrum antibiotics. She was discharged on PO cipro x 7 days at [facility]. She now presents to the ED with complaints of ongoing nausea/vomiting, decreased appetite, intermittent abdominal pain. She had ureteral stent removed outpatient yesterday 11/30/22. She was in infusion center today to give IVF but had episode of vomiting so she was sent to the ED. She denies dysuria/urgency. She denies history of gastroparesis (has had gastric emptying study in 2020 that was normal). In ED, she is noted to be tachycardic in the 130s, blood pressure 90s, along with low grade fever 100.4. EKG shows sinus tachycardia. Creatinine remains elevated above baseline. She is receiving 1L LR bolus in ED. Plan for repeat infectious work up, IVF hydration, IV antibiotics, kidney US, and CT A/P. Will also check CMV PCR.    Ms. Read is a 27 y.o. year old female who is status post Kidney, Pancreas Transplant - 11/3/2022  (#1).    Her maintenance immunosuppression consists of:   Immunosuppressants (From admission, onward)      Start     Stop Route Frequency Ordered    12/08/22 1800  tacrolimus capsule 3 mg         -- Oral 2 times daily 12/08/22 0912            Hospital Course:  Patient admitted from the ED with n/v, GEORGE, and abdominal pain (had ureteral stent removed outpatient the day before). Pancreas US satisfactory. Infectious work up revealed UTI for for E.  Faecium & Diphtheroids. Blood cxs ngtd. Continued to spike fever despite IV abx. ID consulted. UTI being treated with Zosyn/luis carlos. Dc in place for mild-mod hydro on US that appeared improved on repeat imaging but on CT the bladder was full despite proper dc placement. Urology upsized dc at bedside. Cr also remains elevated above baseline. DSA's undetected. Plan for kidney biopsy 12/8. Ct A/P obtained to evaluate patients abdominal pain. Showed possible infectious colitis, typhlitis or nonspecific colopathy. C diff PCR negative. CMV PCR negative. GI pathogens pending. Pt c/o worsening diarrhea so GI was consulted, no plan to scope at this time. Lastly, hematology was consulted for a high ferritin level found during the work-up of anemia. Hyperferritinemia most likely in the setting of sepsis and inflammation. H score 68 points, with <1% probability of Hemophagocytic syndrome. Ferritin trending down.    Interval history: No acute events overnight. Cr improved slightly but remains above BL. Plan for kidney bx with IR today. Pt reports episodes of diarrhea are increasing again. Spoke with GI, want to see how pt does with imodium. Cont to hold off scoping at this time. GI pathogen panel is pending. Remains afebrile. Cont Zosyn and Luis Carlos, EOT 12/10. Hyperferritinemia most likely in the setting of sepsis and inflammation, continues to trend down. Will continue to monitor.       Past Medical, Surgical, Family, and Social History:   Unchanged from H&P.    Scheduled Meds:   atorvastatin  40 mg Oral Daily    ganciclovir (CYTOVENE) IVPB  0.625 mg/kg Intravenous Q24H    heparin (porcine)  5,000 Units Subcutaneous Q8H    micafungin (MYCAMINE) IVPB  100 mg Intravenous Q24H    mupirocin   Nasal BID    piperacillin-tazobactam (ZOSYN) IVPB  4.5 g Intravenous Q8H    predniSONE  5 mg Oral Daily    sulfamethoxazole-trimethoprim 400-80mg  1 tablet Oral Every Mon, Wed, Fri    tacrolimus  3 mg Oral BID    venlafaxine  37.5 mg Oral  Daily     Continuous Infusions:   sodium chloride 0.9% 50 mL/hr at 12/08/22 0700     PRN Meds:sodium chloride 0.9%, acetaminophen, dextrose 10%, dextrose 10%, glucagon (human recombinant), glucose, glucose, insulin aspart U-100, ondansetron, promethazine (PHENERGAN) IVPB, sodium chloride 0.9%    Intake/Output - Last 3 Shifts         12/06 0700 12/07 0659 12/07 0700 12/08 0659 12/08 0700 12/09 0659    P.O. 870 400     I.V. (mL/kg) 1145 (17.7) 1250.4 (19.6) 103.2 (1.6)    IV Piggyback 978.7 487.5 51.7    Total Intake(mL/kg) 2993.8 (46.3) 2137.9 (33.6) 154.9 (2.4)    Urine (mL/kg/hr) 1350 (0.9) 2545 (1.7) 850 (2.4)    Stool 0 0     Total Output 1350 2545 850    Net +1643.8 -407.1 -695.1           Stool Occurrence 5 x 2 x              Review of Systems   Constitutional:  Positive for activity change, appetite change (decreased appetite) and fatigue. Negative for chills and fever.   HENT: Negative.     Eyes: Negative.    Respiratory:  Negative for shortness of breath.    Cardiovascular:  Negative for chest pain and leg swelling.   Gastrointestinal:  Positive for diarrhea. Negative for abdominal distention, constipation, nausea and vomiting.   Endocrine: Negative.    Genitourinary:  Negative for dysuria and hematuria.   Musculoskeletal: Negative.    Skin:  Positive for wound.   Allergic/Immunologic: Positive for immunocompromised state.   Neurological:  Positive for weakness. Negative for dizziness and light-headedness.   Hematological: Negative.    Psychiatric/Behavioral:  Negative for agitation and confusion. The patient is not nervous/anxious.     Objective:     Vital Signs (Most Recent):  Temp: 98.9 °F (37.2 °C) (12/08/22 1149)  Pulse: (!) 128 (12/08/22 1149)  Resp: 18 (12/08/22 1149)  BP: 110/61 (12/08/22 1149)  SpO2: 100 % (12/08/22 1149)   Vital Signs (24h Range):  Temp:  [97.5 °F (36.4 °C)-98.9 °F (37.2 °C)] 98.9 °F (37.2 °C)  Pulse:  [] 128  Resp:  [16-18] 18  SpO2:  [91 %-100 %] 100 %  BP:  "(104-138)/(56-71) 110/61     Weight: 63.7 kg (140 lb 6.9 oz)  Height: 5' 4" (162.6 cm)  Body mass index is 24.11 kg/m².    Physical Exam  Vitals and nursing note reviewed.   Constitutional:       General: She is not in acute distress.     Appearance: She is ill-appearing.   HENT:      Head: Normocephalic.      Right Ear: External ear normal.      Left Ear: External ear normal.      Nose: Nose normal.   Eyes:      Conjunctiva/sclera: Conjunctivae normal.   Cardiovascular:      Rate and Rhythm: Regular rhythm. Tachycardia present.      Heart sounds: No murmur heard.    No gallop.   Pulmonary:      Effort: Pulmonary effort is normal.      Breath sounds: No wheezing or rales.   Abdominal:      General: There is no distension.      Tenderness: There is abdominal tenderness. There is no guarding or rebound.   Genitourinary:     Comments: Moncada in place   Musculoskeletal:         General: No tenderness.      Cervical back: Normal range of motion.      Right lower leg: No edema.      Left lower leg: No edema.   Skin:     General: Skin is warm.   Neurological:      Mental Status: She is alert and oriented to person, place, and time.      Motor: Weakness present.   Psychiatric:         Mood and Affect: Mood normal.         Behavior: Behavior normal.         Thought Content: Thought content normal.         Judgment: Judgment normal.       Laboratory:  CBC:   Recent Labs   Lab 12/06/22  1020 12/07/22  0601 12/08/22  0616   WBC 13.76* 10.47 10.47   RBC 3.08* 3.29* 3.18*   HGB 9.2* 9.7* 9.5*   HCT 27.9* 30.3* 29.6*   * 558* 616*   MCV 91 92 93   MCH 29.9 29.5 29.9   MCHC 33.0 32.0 32.1     BMP:   Recent Labs   Lab 12/06/22  1803 12/07/22  0601 12/08/22  0616    91 93    140 138   K 3.4* 3.7 3.4*    109 109   CO2 23 20* 21*   BUN 24* 29* 22*   CREATININE 3.4* 3.4* 2.8*   CALCIUM 9.2 8.9 9.1     Labs within the past 24 hours have been reviewed.    Diagnostic Results:  US - Kidney: Results for orders placed " during the hospital encounter of 12/01/22    US Transplant Kidney With Doppler    Narrative  EXAMINATION:  US TRANSPLANT KIDNEY WITH DOPPLER    CLINICAL HISTORY:  s/p SPK. increase in Cr, severe abdominal pain;    TECHNIQUE:  Transplant renal ultrasound of the right lower quadrant with color flow doppler and duplex analysis.    COMPARISON:  Renal transplant ultrasound 12/03/2022.  12/01/2022.    FINDINGS:  Status post right lower quadrant renal transplant on 11/03/2022.  The renal transplant measures 12.5 cm and demonstrates no focal parenchymal abnormality. Minimal transplant hydronephrosis.  No peritransplant fluid.    Renal arterial resistive indices are as follows: Interlobar 0.77 (previously 0.81), segmental Up 0.75 (previously 0.72), segmental Mid 0.77 (previously 0.78), segmental Low 0.76 (previously 0.80).  There is no evidence of a tardus parvus waveform. The maximum velocity in the main renal artery is 236 (previously 187) cm/sec.  Maximum velocity in the main renal vein is 139 (previously 115) centimeters/second.  RA/iliac ratio 0.97    The renal transplant venous system is unremarkable.    Bladder is decompressed around Moncada catheter.    Impression  Minimal transplant hydronephrosis.    Mild interval increased main renal artery and main renal vein peak systolic velocity compared to prior.  Recommend attention on follow-up.    Intraparenchymal resistive indices are improved compared to multiple prior exams.    Electronically signed by resident: Laura Sifuentes  Date:    12/04/2022  Time:    15:10    Electronically signed by: Zulma Anaya MD  Date:    12/04/2022  Time:    16:05    US - Pancreas: Results for orders placed during the hospital encounter of 12/01/22    US Pancreas Transplant Post    Narrative  EXAMINATION:  US DOPPLER PANCREAS TRANSPLANT POST (XPD)    CLINICAL HISTORY:  s/p SPK; r/o pancreas infarct;    TECHNIQUE:  Grayscale, color flow, and spectral analysis was used to evaluate the  pancreas allograft using transabdominal ultrasound technique.    COMPARISON:  Ultrasound from 11/25/2022    FINDINGS:  Pancreatic allograft demonstrates heterogeneous area near the pancreatic head neck, not significantly changed compared to prior examination.  There are no peripancreatic fluid collections.    Velocities:    Conduit:216 cm/s    Splenic artery:96 cm/s    Splenic vein:14 cm/s    Portal vein:109 cm/s    Superior mesenteric vein:40 cm/s    Iliac artery:204 cm/s    SMA of Y graft: 38 cm/s    Resistive indices range from 0.62-0.78.    Impression  Satisfactory Doppler evaluation of the pancreatic allograft.    Redemonstration of heterogeneous, hypoechoic area of the pancreatic head/neck with adjacent bowel.  Findings are nonspecific and continued follow-up recommended.      Electronically signed by: Jim Cedeño MD  Date:    12/05/2022  Time:    12:30

## 2022-12-08 NOTE — ASSESSMENT & PLAN NOTE
- H/H stable on AM labs  - currently menstruating  - given 1u prbc 12/3/22  - Parvo pending  - Hematology was consulted for a high ferritin level found during the work-up of anemia  - Hyperferritinemia most likely in the setting of sepsis and inflammation, and is improving  - H score 68 points, with <1% probability of Hemophagocytic syndrome.

## 2022-12-08 NOTE — CARE UPDATE
"RAPID RESPONSE NURSE CHART REVIEW       Chart Reviewed: 12/08/2022, 5:14 PM    MRN: [id number]  Bed: 52735/60736 A    Dx: GEORGE (acute kidney injury)    Isabela Read has a past medical history of Acute kidney injury superimposed on chronic kidney disease, Anemia, Chronic hypertension with exacerbation during pregnancy in second trimester, Chronic kidney disease, Depression, Diabetes mellitus, Diabetic retinopathy, Diarrhea, Encounter for blood transfusion, End stage renal failure on dialysis, Fever, Gastroparesis, Glaucoma, Hepatomegaly, psychiatric care, Hyperlipidemia, Hypertension, Nephrotic syndrome, Nephrotic syndrome, Nonspecific reaction to tuberculin skin test without active tuberculosis, Palpitations, Poor fetal growth affecting management of mother in second trimester, Restrictive lung disease, Retinal detachment, Severe pre-eclampsia in second trimester, SIRS (systemic inflammatory response syndrome), and Type 1 diabetes mellitus with end-stage renal disease (ESRD).    Last VS: BP (!) 199/91 (BP Location: Right arm, Patient Position: Lying)   Pulse (!) 111   Temp 97.2 °F (36.2 °C) (Temporal)   Resp (!) 21   Ht 5' 4" (1.626 m)   Wt 63.7 kg (140 lb 6.9 oz)   LMP 11/29/2022   SpO2 99%   Breastfeeding No   BMI 24.11 kg/m²     24H Vital Sign Range:  Temp:  [97.2 °F (36.2 °C)-98.9 °F (37.2 °C)]   Pulse:  []   Resp:  [15-21]   BP: (104-222)/()   SpO2:  [97 %-100 %]     Level of Consciousness (AVPU): responds to voice    Recent Labs     12/06/22  1020 12/07/22  0601 12/08/22  0616   WBC 13.76* 10.47 10.47   HGB 9.2* 9.7* 9.5*   HCT 27.9* 30.3* 29.6*   * 558* 616*       Recent Labs     12/06/22  1020 12/06/22  1803 12/07/22  0601 12/08/22  0616    140 140 138   K 2.8* 3.4* 3.7 3.4*    105 109 109   CO2 21* 23 20* 21*   CREATININE 3.0* 3.4* 3.4* 2.8*    100 91 93   PHOS 3.3  --  2.8 2.1*   MG 2.1  --  1.9 1.6        No results for input(s): PH, PCO2, PO2, HCO3, " POCSATURATED, BE in the last 72 hours.     OXYGEN:     Oxygen Concentration (%): 100  O2 Device (Oxygen Therapy): room air    MEWS score: 2    bedside RNIsabela  contacted about htn. Patient off unit. Md notified and put in Prn hydralizine No concerns verbalized at this time. Instructed to call [phone] for further concerns or assistance.    Sarmad Franklin RN

## 2022-12-08 NOTE — PLAN OF CARE
Post procedure phone report given to primary nurse, Isabela. Pt to return to inpt room after  recovery.

## 2022-12-08 NOTE — ASSESSMENT & PLAN NOTE
- Hematology was consulted for a high ferritin level found during the work-up of anemia  - Hyperferritinemia most likely in the setting of sepsis and inflammation  - H score 68 points, with <1% probability of Hemophagocytic syndrome  - Ferritin trending down.

## 2022-12-08 NOTE — PLAN OF CARE
- Patient is a kidney-pancreas transplant recipient from 11/3/22, admitted 12/1/22 with fever, abdominal pain, and nausea/vomiting.  - Overnight, patient has remained afebrile (last fever was ~0500 on 12/5).  - No nausea overnight. Patient has been tolerating regular diet. NPO since midnight for kidney biopsy today.  - Patient continues to have diarrhea and occasional fecal incontinence. Awaiting results of GI pathogens panel.  - Moncada in place for hydronephrosis. Patient had upswing in urine output overnight= 2,065 mL. UPT collected per order for IR procedure today.  - IV fluids: NS at 50 mL/hr.  - IV antiinfectives: Zosyn q8h; ganciclovir q24h; micafungin q24h.  - Standing BPs monitored. IR department made aware that patient's BPs will be falsely elevated while she is in lying position.  - BG monitored AC/HS. BG= 109 at bedtime.  - Patient has mother at bedside.

## 2022-12-08 NOTE — PLAN OF CARE
1hr IR recovery complete. VSS. Dressing CDI. IV patent. Pt. Ready for transport back to floor. Awaiting transport.

## 2022-12-08 NOTE — PLAN OF CARE
Post procedure bedside report given to [facility] RN, Gloria. VSS, Pt drowsy but arouses easily to verbal stimuli.

## 2022-12-08 NOTE — PLAN OF CARE
Pt arrived to IR room 173-CT via bed w/ IR tech. AAO x4, name//allergies/procedure verified. Pt will be monitored by RN 2 bedside throughout procedure/sedation..

## 2022-12-08 NOTE — ASSESSMENT & PLAN NOTE
- With GEORGE  - UC now predominant for enterococcus, also diphtheroids present  - Cont zosyn/luis carlos, EOT 12/10  - Repeat urine cx ngtd

## 2022-12-08 NOTE — PLAN OF CARE
Pt arrived to [facility] room 4 for 1hr recovery s/p kidney biopsy, no acute distress noted. VSS. Dressing CDI. Pt resting comfortably.

## 2022-12-09 DIAGNOSIS — Z94.0 KIDNEY REPLACED BY TRANSPLANT: Primary | ICD-10-CM

## 2022-12-09 PROBLEM — R33.9 URINARY RETENTION WITH INCOMPLETE BLADDER EMPTYING: Status: ACTIVE | Noted: 2022-12-01

## 2022-12-09 LAB
ALBUMIN SERPL BCP-MCNC: 2.3 G/DL (ref 3.5–5.2)
AMYLASE SERPL-CCNC: 57 U/L (ref 20–110)
ANION GAP SERPL CALC-SCNC: 11 MMOL/L (ref 8–16)
B19V DNA # SPEC NAA+PROBE: NOT DETECTED COPIES/ML
BACTERIA BLD CULT: NORMAL
BACTERIA BLD CULT: NORMAL
BASOPHILS # BLD AUTO: 0.03 K/UL (ref 0–0.2)
BASOPHILS NFR BLD: 0.3 % (ref 0–1.9)
BUN SERPL-MCNC: 18 MG/DL (ref 6–20)
CALCIUM SERPL-MCNC: 9 MG/DL (ref 8.7–10.5)
CAMPY SP DNA.DIARRHEA STL QL NAA+PROBE: NOT DETECTED
CHLORIDE SERPL-SCNC: 106 MMOL/L (ref 95–110)
CO2 SERPL-SCNC: 18 MMOL/L (ref 23–29)
CREAT SERPL-MCNC: 2.5 MG/DL (ref 0.5–1.4)
CRYPTOSP DNA SPEC QL NAA+PROBE: NOT DETECTED
CYTOMEGALOVIRUS LOG (IU/ML): 4.48 LOGIU/ML
CYTOMEGALOVIRUS PCR, QUANT: ABNORMAL IU/ML
DIFFERENTIAL METHOD: ABNORMAL
E COLI O157H7 DNA SPEC QL NAA+PROBE: NOT DETECTED
E HISTOLYT DNA SPEC QL NAA+PROBE: NOT DETECTED
EOSINOPHIL # BLD AUTO: 0.1 K/UL (ref 0–0.5)
EOSINOPHIL NFR BLD: 0.6 % (ref 0–8)
ERYTHROCYTE [DISTWIDTH] IN BLOOD BY AUTOMATED COUNT: 15.5 % (ref 11.5–14.5)
EST. GFR  (NO RACE VARIABLE): 26.4 ML/MIN/1.73 M^2
G LAMBLIA DNA SPEC QL NAA+PROBE: NOT DETECTED
GLUCOSE SERPL-MCNC: 102 MG/DL (ref 70–110)
GPP - ADENOVIRUS 40/41: NOT DETECTED
GPP - ENTEROTOXIGENIC E COLI (ETEC): NOT DETECTED
GPP - SHIGELLA: NOT DETECTED
HCT VFR BLD AUTO: 28.4 % (ref 37–48.5)
HGB BLD-MCNC: 9.2 G/DL (ref 12–16)
IMM GRANULOCYTES # BLD AUTO: 0.11 K/UL (ref 0–0.04)
IMM GRANULOCYTES NFR BLD AUTO: 1 % (ref 0–0.5)
LACTATE PLASV-SCNC: NOT DETECTED MMOL/L
LIPASE SERPL-CCNC: 19 U/L (ref 4–60)
LYMPHOCYTES # BLD AUTO: 0.4 K/UL (ref 1–4.8)
LYMPHOCYTES NFR BLD: 3.9 % (ref 18–48)
MAGNESIUM SERPL-MCNC: 1.4 MG/DL (ref 1.6–2.6)
MCH RBC QN AUTO: 30.5 PG (ref 27–31)
MCHC RBC AUTO-ENTMCNC: 32.4 G/DL (ref 32–36)
MCV RBC AUTO: 94 FL (ref 82–98)
MONOCYTES # BLD AUTO: 1.4 K/UL (ref 0.3–1)
MONOCYTES NFR BLD: 12.5 % (ref 4–15)
NEUTROPHILS # BLD AUTO: 9 K/UL (ref 1.8–7.7)
NEUTROPHILS NFR BLD: 81.7 % (ref 38–73)
NOROVIRUS RNA STL QL NAA+PROBE: NOT DETECTED
NRBC BLD-RTO: 0 /100 WBC
PHOSPHATE SERPL-MCNC: 2.2 MG/DL (ref 2.7–4.5)
PLATELET # BLD AUTO: 564 K/UL (ref 150–450)
PMV BLD AUTO: 9.8 FL (ref 9.2–12.9)
POCT GLUCOSE: 98 MG/DL (ref 70–110)
POTASSIUM SERPL-SCNC: 3.5 MMOL/L (ref 3.5–5.1)
RBC # BLD AUTO: 3.02 M/UL (ref 4–5.4)
RV DSRNA STL QL NAA+PROBE: NOT DETECTED
SALMONELLA DNA SPEC QL NAA+PROBE: NOT DETECTED
SODIUM SERPL-SCNC: 135 MMOL/L (ref 136–145)
SPECIMEN SOURCE: NORMAL
TACROLIMUS BLD-MCNC: 8.6 NG/ML (ref 5–15)
V CHOLERAE DNA SPEC QL NAA+PROBE: NOT DETECTED
WBC # BLD AUTO: 11.04 K/UL (ref 3.9–12.7)
Y ENTERO RECN STL QL NAA+PROBE: NOT DETECTED

## 2022-12-09 PROCEDURE — 25000003 PHARM REV CODE 250: Performed by: INTERNAL MEDICINE

## 2022-12-09 PROCEDURE — 80069 RENAL FUNCTION PANEL: CPT | Performed by: INTERNAL MEDICINE

## 2022-12-09 PROCEDURE — 20600001 HC STEP DOWN PRIVATE ROOM

## 2022-12-09 PROCEDURE — 63600175 PHARM REV CODE 636 W HCPCS: Performed by: INTERNAL MEDICINE

## 2022-12-09 PROCEDURE — 83690 ASSAY OF LIPASE: CPT | Performed by: INTERNAL MEDICINE

## 2022-12-09 PROCEDURE — 83735 ASSAY OF MAGNESIUM: CPT | Performed by: INTERNAL MEDICINE

## 2022-12-09 PROCEDURE — 63600175 PHARM REV CODE 636 W HCPCS: Performed by: PHYSICIAN ASSISTANT

## 2022-12-09 PROCEDURE — 99233 SBSQ HOSP IP/OBS HIGH 50: CPT | Mod: ,,, | Performed by: PHYSICIAN ASSISTANT

## 2022-12-09 PROCEDURE — 25000003 PHARM REV CODE 250: Performed by: PHYSICIAN ASSISTANT

## 2022-12-09 PROCEDURE — 82150 ASSAY OF AMYLASE: CPT | Performed by: INTERNAL MEDICINE

## 2022-12-09 PROCEDURE — 80197 ASSAY OF TACROLIMUS: CPT | Performed by: INTERNAL MEDICINE

## 2022-12-09 PROCEDURE — 85025 COMPLETE CBC W/AUTO DIFF WBC: CPT | Performed by: INTERNAL MEDICINE

## 2022-12-09 PROCEDURE — 36415 COLL VENOUS BLD VENIPUNCTURE: CPT | Performed by: INTERNAL MEDICINE

## 2022-12-09 PROCEDURE — 99233 PR SUBSEQUENT HOSPITAL CARE,LEVL III: ICD-10-PCS | Mod: ,,, | Performed by: PHYSICIAN ASSISTANT

## 2022-12-09 RX ORDER — LOPERAMIDE HYDROCHLORIDE 2 MG/1
2 CAPSULE ORAL EVERY 6 HOURS PRN
Status: DISCONTINUED | OUTPATIENT
Start: 2022-12-09 | End: 2022-12-15 | Stop reason: HOSPADM

## 2022-12-09 RX ORDER — SODIUM BICARBONATE 650 MG/1
1300 TABLET ORAL 2 TIMES DAILY
Status: DISCONTINUED | OUTPATIENT
Start: 2022-12-09 | End: 2022-12-15 | Stop reason: HOSPADM

## 2022-12-09 RX ORDER — MAGNESIUM SULFATE HEPTAHYDRATE 40 MG/ML
2 INJECTION, SOLUTION INTRAVENOUS
Status: COMPLETED | OUTPATIENT
Start: 2022-12-09 | End: 2022-12-09

## 2022-12-09 RX ORDER — LOPERAMIDE HYDROCHLORIDE 2 MG/1
2 CAPSULE ORAL ONCE AS NEEDED
Status: DISCONTINUED | OUTPATIENT
Start: 2022-12-09 | End: 2022-12-09

## 2022-12-09 RX ADMIN — SODIUM BICARBONATE 650 MG TABLET 1300 MG: at 08:12

## 2022-12-09 RX ADMIN — SULFAMETHOXAZOLE AND TRIMETHOPRIM 1 TABLET: 400; 80 TABLET ORAL at 09:12

## 2022-12-09 RX ADMIN — MAGNESIUM SULFATE 2 G: 2 INJECTION INTRAVENOUS at 12:12

## 2022-12-09 RX ADMIN — PIPERACILLIN SODIUM AND TAZOBACTAM SODIUM 4.5 G: 4; .5 INJECTION, POWDER, LYOPHILIZED, FOR SOLUTION INTRAVENOUS at 08:12

## 2022-12-09 RX ADMIN — SODIUM BICARBONATE: 84 INJECTION, SOLUTION INTRAVENOUS at 10:12

## 2022-12-09 RX ADMIN — ATORVASTATIN CALCIUM 40 MG: 40 TABLET, FILM COATED ORAL at 09:12

## 2022-12-09 RX ADMIN — PIPERACILLIN SODIUM AND TAZOBACTAM SODIUM 4.5 G: 4; .5 INJECTION, POWDER, LYOPHILIZED, FOR SOLUTION INTRAVENOUS at 12:12

## 2022-12-09 RX ADMIN — PIPERACILLIN SODIUM AND TAZOBACTAM SODIUM 4.5 G: 4; .5 INJECTION, POWDER, LYOPHILIZED, FOR SOLUTION INTRAVENOUS at 09:12

## 2022-12-09 RX ADMIN — MUPIROCIN: 20 OINTMENT TOPICAL at 08:12

## 2022-12-09 RX ADMIN — TACROLIMUS 3 MG: 1 CAPSULE ORAL at 05:12

## 2022-12-09 RX ADMIN — GANCICLOVIR SODIUM 39 MG: 50 INJECTION, POWDER, LYOPHILIZED, FOR SOLUTION INTRAVENTRICULAR at 05:12

## 2022-12-09 RX ADMIN — MAGNESIUM SULFATE 2 G: 2 INJECTION INTRAVENOUS at 10:12

## 2022-12-09 RX ADMIN — VENLAFAXINE HYDROCHLORIDE 37.5 MG: 37.5 CAPSULE, EXTENDED RELEASE ORAL at 09:12

## 2022-12-09 RX ADMIN — MUPIROCIN: 20 OINTMENT TOPICAL at 09:12

## 2022-12-09 RX ADMIN — SODIUM BICARBONATE 650 MG TABLET 1300 MG: at 10:12

## 2022-12-09 RX ADMIN — ONDANSETRON 4 MG: 2 INJECTION INTRAMUSCULAR; INTRAVENOUS at 02:12

## 2022-12-09 RX ADMIN — TACROLIMUS 3 MG: 1 CAPSULE ORAL at 09:12

## 2022-12-09 RX ADMIN — LOPERAMIDE HYDROCHLORIDE 2 MG: 2 CAPSULE ORAL at 11:12

## 2022-12-09 RX ADMIN — PROMETHAZINE HYDROCHLORIDE 6.25 MG: 25 INJECTION INTRAMUSCULAR; INTRAVENOUS at 04:12

## 2022-12-09 RX ADMIN — PREDNISONE 5 MG: 5 TABLET ORAL at 09:12

## 2022-12-09 NOTE — PLAN OF CARE
Admit 12/1 for GEORGE s/p KPtx  -AAO4, VSS/afebrile, NST on tele, on RA, denies pain  -Cr 2.8 down from last  -kid u/s w mild to moderate hydronephrosis; repeat u/s 12/3 improved  -urine cx (+) enterococcus; repeat cx NGTD  -blood cx NGTD  -12/8 kidney biopsy - results pending  -dc in place with clear yellow UOP + mucous strands  -12/4 CT showing possible colitis; GI following, pathogens panel pending   -cytovene q24, micafungin q24, zosyn q8  -NS @ 50 ml/hr continuous  -midline incision steri strips intact  -standing BP recorded  -accuchecks achs, ss per mar  -mom at bedside, up ad gm, fall precautions maintained, call bell in reach

## 2022-12-09 NOTE — ASSESSMENT & PLAN NOTE
- Cr remains elevated above BL  - DSA's undetected  - Kidney biopsy done 12/8, pending   - Had ureteral stent removed 11/30/22

## 2022-12-09 NOTE — TREATMENT PLAN
BRIEF GI TREATMENT PLAN    All stool studies (including Pathogen panel) have been negative. Can start fiber supplementation and PRN Imodium 2mg Q6hrs (increase up to a max of 16 mg /day) for diarrhea.     GI will sign off.     Omer Almazan MD  PGY-IV, GI

## 2022-12-09 NOTE — SUBJECTIVE & OBJECTIVE
Subjective:   History of Present Illness:  Ms. Read is a 27 y.o.  female with ESRD secondary to diabetic nephropathy and HTN, now s/p SPK 11/3/22 (Thymo induction, CMV D-/R+). Her post-op course was complicated by urinary retention requiring Moncada replacement, now resolved. She was recently admitted to the hospital and discharged 11/29/22 after presenting with nausea/vomiting, GEORGE, and had fever. Infectious work up performed. Urine cx had +multiple organisms none in predominance. Kidney US with edema/possible pyelo, ID consulted. She was initially treated with broad spectrum antibiotics. She was discharged on PO cipro x 7 days at [facility]. She now presents to the ED with complaints of ongoing nausea/vomiting, decreased appetite, intermittent abdominal pain. She had ureteral stent removed outpatient yesterday 11/30/22. She was in infusion center today to give IVF but had episode of vomiting so she was sent to the ED. She denies dysuria/urgency. She denies history of gastroparesis (has had gastric emptying study in 2020 that was normal). In ED, she is noted to be tachycardic in the 130s, blood pressure 90s, along with low grade fever 100.4. EKG shows sinus tachycardia. Creatinine remains elevated above baseline. She is receiving 1L LR bolus in ED. Plan for repeat infectious work up, IVF hydration, IV antibiotics, kidney US, and CT A/P. Will also check CMV PCR.    Ms. Read is a 27 y.o. year old female who is status post Kidney, Pancreas Transplant - 11/3/2022  (#1).    Her maintenance immunosuppression consists of:   Immunosuppressants (From admission, onward)      Start     Stop Route Frequency Ordered    12/08/22 1800  tacrolimus capsule 3 mg         -- Oral 2 times daily 12/08/22 0912            Hospital Course:  Patient admitted from the ED with n/v, GEORGE, and abdominal pain (had ureteral stent removed outpatient the day before). Pancreas US satisfactory. Infectious work up revealed UTI for for E.  Faecium & Diphtheroids. Blood cxs ngtd. Continued to spike fever despite IV abx. ID consulted. UTI being treated with Zosyn/luis carlos. Dc in place for mild-mod hydro on US that appeared improved on repeat imaging but on CT the bladder was full despite proper dc placement. Urology upsized dc at bedside. Cr also remains elevated above baseline. DSA's undetected. Plan for kidney biopsy 12/8. Ct A/P obtained to evaluate patients abdominal pain. Showed possible infectious colitis, typhlitis or nonspecific colopathy. C diff PCR negative. CMV PCR negative. GI pathogens pending. Pt c/o worsening diarrhea so GI was consulted, no plan to scope at this time. Lastly, hematology was consulted for a high ferritin level found during the work-up of anemia. Hyperferritinemia most likely in the setting of sepsis and inflammation. H score 68 points, with <1% probability of Hemophagocytic syndrome. Ferritin trending down.    Interval history: No acute events overnight. Pt had kidney bx yesterday, path pending. Cr improving but remains above baseline. Per GI, tried imodium for worsening diarrhea and pt reports significant improvement. GI pathogen panel negative. Cont prn imodium. Remains afebrile. Cont Zosyn and Luis Carlos, EOT 12/10. Dc removed this am for voiding trial. Pt has had decreased oral intake. Will continue IVFs for one more day but patient educated on importance of proper hydration/nutrition. Encourage ambulation. VSS. Will continue to monitor.       Past Medical, Surgical, Family, and Social History:   Unchanged from H&P.    Scheduled Meds:   atorvastatin  40 mg Oral Daily    ganciclovir (CYTOVENE) IVPB  0.625 mg/kg Intravenous Q24H    heparin (porcine)  5,000 Units Subcutaneous Q8H    magnesium sulfate IVPB  2 g Intravenous Q2H    micafungin (MYCAMINE) IVPB  100 mg Intravenous Q24H    mupirocin   Nasal BID    piperacillin-tazobactam (ZOSYN) IVPB  4.5 g Intravenous Q8H    predniSONE  5 mg Oral Daily    psyllium husk  (aspartame)  1 packet Oral Daily    sodium bicarbonate  1,300 mg Oral BID    sulfamethoxazole-trimethoprim 400-80mg  1 tablet Oral Every Mon, Wed, Fri    tacrolimus  3 mg Oral BID    venlafaxine  37.5 mg Oral Daily     Continuous Infusions:   sodium bicarbonate drip 50 mL/hr at 12/09/22 1030     PRN Meds:sodium chloride 0.9%, acetaminophen, desmopressin (DDAVP) IVPB, dextrose 10%, dextrose 10%, glucagon (human recombinant), glucose, glucose, hydrALAZINE, loperamide, ondansetron, promethazine (PHENERGAN) IVPB, sodium chloride 0.9%    Intake/Output - Last 3 Shifts         12/07 0700  12/08 0659 12/08 0700  12/09 0659 12/09 0700  12/10 0659    P.O. 400 240     I.V. (mL/kg) 1250.4 (19.6) 478.5 (7.5)     IV Piggyback 487.5 99.5     Total Intake(mL/kg) 2137.9 (33.6) 818 (12.8)     Urine (mL/kg/hr) 2545 (1.7) 3200 (2.1) 500 (2)    Stool 0 0     Total Output 2545 3200 500    Net -407.1 -2382 -500           Stool Occurrence 2 x 1 x              Review of Systems   Constitutional:  Positive for activity change, appetite change (decreased appetite) and fatigue. Negative for chills and fever.   HENT: Negative.     Eyes: Negative.    Respiratory:  Negative for shortness of breath.    Cardiovascular:  Negative for chest pain and leg swelling.   Gastrointestinal:  Positive for diarrhea (improved). Negative for abdominal distention, constipation, nausea and vomiting.   Endocrine: Negative.    Genitourinary:  Negative for decreased urine volume, dysuria and hematuria.   Musculoskeletal: Negative.    Skin:  Positive for wound.   Allergic/Immunologic: Positive for immunocompromised state.   Neurological:  Positive for weakness. Negative for dizziness and light-headedness.   Hematological: Negative.    Psychiatric/Behavioral:  Negative for agitation and confusion. The patient is not nervous/anxious.     Objective:     Vital Signs (Most Recent):  Temp: 99 °F (37.2 °C) (12/09/22 0922)  Pulse: (!) 114 (12/09/22 1057)  Resp: 16 (12/09/22  "0922)  BP: 137/71 (12/09/22 0922)  SpO2: 100 % (12/09/22 0922)   Vital Signs (24h Range):  Temp:  [97.2 °F (36.2 °C)-99.2 °F (37.3 °C)] 99 °F (37.2 °C)  Pulse:  [100-128] 114  Resp:  [12-21] 16  SpO2:  [97 %-100 %] 100 %  BP: (110-222)/() 137/71     Weight: 63.7 kg (140 lb 6.9 oz)  Height: 5' 4" (162.6 cm)  Body mass index is 24.11 kg/m².    Physical Exam  Vitals and nursing note reviewed.   Constitutional:       General: She is not in acute distress.  HENT:      Head: Normocephalic.      Right Ear: External ear normal.      Left Ear: External ear normal.      Nose: Nose normal.   Eyes:      Conjunctiva/sclera: Conjunctivae normal.   Cardiovascular:      Rate and Rhythm: Regular rhythm. Tachycardia present.      Heart sounds: No murmur heard.    No gallop.   Pulmonary:      Effort: Pulmonary effort is normal.      Breath sounds: No wheezing or rales.   Abdominal:      General: There is no distension.      Tenderness: There is abdominal tenderness (improved). There is no guarding or rebound.   Musculoskeletal:         General: No tenderness.      Cervical back: Normal range of motion.      Right lower leg: No edema.      Left lower leg: No edema.   Skin:     General: Skin is warm.   Neurological:      Mental Status: She is alert and oriented to person, place, and time.      Motor: Weakness present.   Psychiatric:         Mood and Affect: Mood normal.         Behavior: Behavior normal.         Thought Content: Thought content normal.         Judgment: Judgment normal.       Laboratory:  CBC:   Recent Labs   Lab 12/07/22  0601 12/08/22  0616 12/09/22  0734   WBC 10.47 10.47 11.04   RBC 3.29* 3.18* 3.02*   HGB 9.7* 9.5* 9.2*   HCT 30.3* 29.6* 28.4*   * 616* 564*   MCV 92 93 94   MCH 29.5 29.9 30.5   MCHC 32.0 32.1 32.4     BMP:   Recent Labs   Lab 12/07/22  0601 12/08/22  0616 12/09/22  0734   GLU 91 93 102    138 135*   K 3.7 3.4* 3.5    109 106   CO2 20* 21* 18*   BUN 29* 22* 18   CREATININE " 3.4* 2.8* 2.5*   CALCIUM 8.9 9.1 9.0     Labs within the past 24 hours have been reviewed.    Diagnostic Results:  US - Kidney: Results for orders placed during the hospital encounter of 12/01/22    US Transplant Kidney With Doppler    Narrative  EXAMINATION:  US TRANSPLANT KIDNEY WITH DOPPLER    CLINICAL HISTORY:  s/p SPK. increase in Cr, severe abdominal pain;    TECHNIQUE:  Transplant renal ultrasound of the right lower quadrant with color flow doppler and duplex analysis.    COMPARISON:  Renal transplant ultrasound 12/03/2022.  12/01/2022.    FINDINGS:  Status post right lower quadrant renal transplant on 11/03/2022.  The renal transplant measures 12.5 cm and demonstrates no focal parenchymal abnormality. Minimal transplant hydronephrosis.  No peritransplant fluid.    Renal arterial resistive indices are as follows: Interlobar 0.77 (previously 0.81), segmental Up 0.75 (previously 0.72), segmental Mid 0.77 (previously 0.78), segmental Low 0.76 (previously 0.80).  There is no evidence of a tardus parvus waveform. The maximum velocity in the main renal artery is 236 (previously 187) cm/sec.  Maximum velocity in the main renal vein is 139 (previously 115) centimeters/second.  RA/iliac ratio 0.97    The renal transplant venous system is unremarkable.    Bladder is decompressed around Moncada catheter.    Impression  Minimal transplant hydronephrosis.    Mild interval increased main renal artery and main renal vein peak systolic velocity compared to prior.  Recommend attention on follow-up.    Intraparenchymal resistive indices are improved compared to multiple prior exams.    Electronically signed by resident: Laura Sifuentes  Date:    12/04/2022  Time:    15:10    Electronically signed by: Zulma Anaya MD  Date:    12/04/2022  Time:    16:05    US - Pancreas: Results for orders placed during the hospital encounter of 12/01/22    US Pancreas Transplant Post    Narrative  EXAMINATION:  US DOPPLER PANCREAS  TRANSPLANT POST (XPD)    CLINICAL HISTORY:  s/p SPK; r/o pancreas infarct;    TECHNIQUE:  Grayscale, color flow, and spectral analysis was used to evaluate the pancreas allograft using transabdominal ultrasound technique.    COMPARISON:  Ultrasound from 11/25/2022    FINDINGS:  Pancreatic allograft demonstrates heterogeneous area near the pancreatic head neck, not significantly changed compared to prior examination.  There are no peripancreatic fluid collections.    Velocities:    Conduit:216 cm/s    Splenic artery:96 cm/s    Splenic vein:14 cm/s    Portal vein:109 cm/s    Superior mesenteric vein:40 cm/s    Iliac artery:204 cm/s    SMA of Y graft: 38 cm/s    Resistive indices range from 0.62-0.78.    Impression  Satisfactory Doppler evaluation of the pancreatic allograft.    Redemonstration of heterogeneous, hypoechoic area of the pancreatic head/neck with adjacent bowel.  Findings are nonspecific and continued follow-up recommended.      Electronically signed by: Jim Cedeño MD  Date:    12/05/2022  Time:    12:30

## 2022-12-09 NOTE — CARE UPDATE
-Glucose Goal 140-180    -A1C:   Hemoglobin A1C   Date Value Ref Range Status   11/02/2022 11.1 (H) 4.0 - 5.6 % Final     Comment:     ADA Screening Guidelines:  5.7-6.4%  Consistent with prediabetes  >or=6.5%  Consistent with diabetes    High levels of fetal hemoglobin interfere with the HbA1C  assay. Heterozygous hemoglobin variants (HbS, HgC, etc)do  not significantly interfere with this assay.   However, presence of multiple variants may affect accuracy.            -HOME REGIMEN: none since pancreas transplant on 11/3/22     -INPATIENT REGIMEN: Novolog Correction Scale        -GLUCOSE TREND FOR THE PAST 24HRS:   Recent Labs   Lab 12/07/22  0821 12/07/22  1735 12/07/22  2120 12/08/22  1253 12/08/22  2040 12/09/22  0910   POCTGLUCOSE 91 101 109 96 116* 98           -NO HYPOGYCEMIAS NOTED     - Diet  Diet renal    -Steroids - Prednisone 5 mg daily    -Tube Feeds - N/A    Sugars remain stable. Not requiring insulin.      Plan:   - Endocrine will sign off as pt BG has been stable, and not requiring insulin.  - Please re-consult with any new concerns.

## 2022-12-09 NOTE — CARE UPDATE
RAPID RESPONSE NURSE ROUND       Rounding completed with charge RN, [place] for hypertension reports will assess orthostatics. No additional concerns verbalized at this time. Instructed to call [phone] for further concerns or assistance.

## 2022-12-09 NOTE — ASSESSMENT & PLAN NOTE
- Blood cx x 2, ngtd  - UA showed 11 WBCs, moderate bacteria, and 8 hyaline casts  - Urine cx with E. Faecium & Diphtheroids  - LA 0.6  - D/w ID, treat with dapto/luis carlos until EOT 12/10  - Repeat urine and blood cxs ngtd  - CT AP noted interval development of short segment circumferential wall thickening of the fluid-filled cecum and proximal colon with surrounding stranding and edema, perhaps infectious colitis, typhlitis or nonspecific colopathy  - C diff PCR negative  - CMV negative  - GI pathogens panel negative   - GI consulted due to fevers, diarrhea, and possible colitis on CT scan

## 2022-12-09 NOTE — NURSING
Patient is still feeling very nauseated after dose of Zofran around 1400, phenergan was requested from the pharmacy as 2nd line treatment for her. She also stated that she does not have an appetite at all and was inquiring about possibly getting something to help her increase this. Palmira with Kidney transplant was contacted and updated on this request, she stated we would see how she did throughout the night, and if still feeling this way tomorrow that they may add Reglan to her medication regimen. Patient will be updated on her inquiry and con't to be mon't for now.

## 2022-12-09 NOTE — ASSESSMENT & PLAN NOTE
- Kidney US showed mild-mod hydro, dc placed  - Repeat US 12/3 with improved hydro and creatinine improved as well  - Dc upsized by urology due CT showing enlarged transplant kidney and distended bladder despite dc in adequate position.   - Plan for voiding trial today

## 2022-12-09 NOTE — ASSESSMENT & PLAN NOTE
- Ct A/P obtained to evaluate patients abdominal pain. Showed possible infectious colitis, typhlitis or nonspecific colopathy  - C diff PCR negative  - CMV PCR negative  - GI pathogen panel negative  - GI consulted, no plan to scope at this time  - Cont prn imodium

## 2022-12-09 NOTE — PROGRESS NOTES
Tentative Weekend Transplant Social Work Discharge Note:    Pt will discharge to patient's home under the care of Kiersten Garcia, patient's mother, phone number  [phone].      met with patient and mother in room. Patient reported doing better than she had in past days.  and patient discussed possible weekend discharge. Patient reported no concerns with discharging and denied any mental health concerns at this time including anxiety and depression.     Per Palmira Vivar PA-C patient will not need any referrals at time of discharge.Patient did report that she might drive herself home if able.     Patient and caregiver agreed to contact transplant team with needs, concerns, and questions as they arise.    Pt aware of, involved in, and coping well with this discharge plan. Pt did not have any concerns with the discharge plan at this time. RICA remains available at [phone].      Makayla Bess LMSW

## 2022-12-09 NOTE — DISCHARGE SUMMARY
[facility]  Kidney Transplant  Discharge Summary    Patient Name: Isabela Read  MRN: [id number]  Admission Date: 12/1/2022  Hospital Length of Stay: 7 days  Discharge Date and Time:  12/15/2022 1:21 PM  Attending Physician: Melani Mcintyre MD   Discharging Provider: Palmira Vivar PA-C  Primary Care Provider: Tavo Bhatia MD    HPI:   Ms. Read is a 27 y.o.  female with ESRD secondary to diabetic nephropathy and HTN, now s/p SPK 11/3/22 (Thymo induction, CMV D-/R+). Her post-op course was complicated by urinary retention requiring Moncada replacement, now resolved. She was recently admitted to the hospital and discharged 11/29/22 after presenting with nausea/vomiting, GEORGE, and had fever. Infectious work up performed. Urine cx had +multiple organisms none in predominance. Kidney US with edema/possible pyelo, ID consulted. She was initially treated with broad spectrum antibiotics. She was discharged on PO cipro x 7 days at [facility]. She now presents to the ED with complaints of ongoing nausea/vomiting, decreased appetite, intermittent abdominal pain. She had ureteral stent removed outpatient yesterday 11/30/22. She was in infusion center today to give IVF but had episode of vomiting so she was sent to the ED. She denies dysuria/urgency. She denies history of gastroparesis (has had gastric emptying study in 2020 that was normal). In ED, she is noted to be tachycardic in the 130s, blood pressure 90s, along with low grade fever 100.4. EKG shows sinus tachycardia. Creatinine remains elevated above baseline. She is receiving 1L LR bolus in ED. Plan for repeat infectious work up, IVF hydration, IV antibiotics, kidney US, and CT A/P. Will also check CMV PCR.      * No surgery found *     Hospital Course:    Patient admitted from the ED with n/v, GEORGE, and abdominal pain (had ureteral stent removed outpatient the day before). Pancreas US satisfactory. Infectious work up revealed UTI  for for E. Faecium & Diphtheroids. Blood cxs ngtd. Continued to spike fever despite IV abx. ID consulted. Transitioned to Zosyn/luis carlos (completed treatment 12/10). Remains afebrile. Dc in place for mild-mod hydro on US that appeared improved on repeat imaging but on CT the bladder was full despite proper dc placement. Urology upsized dc at bedside. Voiding trial 12/9 passed but Cr began to trend up again and mild hydro on US unchanged. Per surgeon dc was replaced and plan to keep dc for ~2 weeks until f/u with urology outpatient. Cr remained elevated above baseline but improving. DSA's undetected. IR consulted and Kidney biopsy done 12/8, showed IF ~ 5%,  interstitial edema due to ATI, significant peritubular capillaritis which high suspicious for ABMR. C4d negative. Gave IVIG 12/14 and plan for 2nd dose outpatient. Will repeat biopsy in 3-4 weeks.     Ct A/P obtained to evaluate patients abdominal pain/diarrhea. Showed possible infectious colitis, typhlitis or nonspecific colopathy. C diff PCR negative. GI pathogens negative. GI was consulted, did not recommend scoping while inpatient. Pt was given PRN imodium for diarrhea and reported significant improvement. Will f/u outpatient with GI for further eval/possible flex sig.     CMV PCR from 12/8, >30,000. Pt was started on IV GCV while inpatient and transitioned to valcyte on discharge. CMV PCR 12/15 pending, will repeat lab weekly.    Lastly, hematology was consulted for a high ferritin level found during anemia work-up. Hyperferritinemia most likely in the setting of sepsis and inflammation. H score 68 points, with <1% probability of Hemophagocytic syndrome. Ferritin trending down.    Pt is stable and ready for discharge. She has no complaints. She has met with PharmD and received education. She will follow up with labs and clinic appointment. Pt expressed understanding of discharge instructions and importance of follow up.      Goals of Care  Treatment Preferences:  Code Status: Full Code      Final Active Diagnoses:    Diagnosis Date Noted POA    PRINCIPAL PROBLEM:  GEORGE (acute kidney injury) [N17.9]  Yes    Other specified anemias [D64.89] 2022 Yes    Steroid-induced hyperglycemia [R73.9, T38.0X5A] 2022 No    Urinary retention with incomplete bladder emptying [R33.9] 2022 Yes    Sepsis [A41.81] 2022 Yes    Status post pancreas transplantation [Z94.83] 2022 Not Applicable    Pyelonephritis, acute [N10] 2022 Yes    Long-term use of immunosuppressant medication [Z79.60] 2022 Not Applicable    Prophylactic immunotherapy [Z29.8] 2022 Not Applicable    Status post -donor kidney transplantation [Z94.0] 2022 Not Applicable    At risk for opportunistic infections [Z91.89] 2022 Yes    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, associated with type 1 diabetes mellitus [E10.3521] 2022 Yes    Diarrhea [R19.7] 2021 Yes    Anemia due to chronic kidney disease [N18.9, D63.1] 2021 Yes    Recurrent major depressive disorder, in partial remission [F33.41] 2021 Yes    Anxiety [F41.9] 2020 Yes     Chronic    Renovascular hypertension [I15.0] 2015 Yes      Problems Resolved During this Admission:    Diagnosis Date Noted Date Resolved POA    SIRS (systemic inflammatory response syndrome) [R65.10] 2022 Yes    Fever [R50.9] 2022 Yes       Treatments: as above    Consults (From admission, onward)          Status Ordering Provider     Inpatient consult to Interventional Radiology  Once        Provider:  (Not yet assigned)    Completed GEO SANCHEZ     Inpatient consult to Hematology/Oncology  Once        Provider:  (Not yet assigned)    Completed JERMAINE DE LA CRUZ     Inpatient consult to Gastroenterology  Once        Provider:  (Not yet assigned)     Completed JERMAINE DE LA CRUZ     Inpatient consult to Endocrinology  Once        Provider:  (Not yet assigned)    Completed GILMER MASTERS     Inpatient consult to Midline team  Once        Provider:  (Not yet assigned)    Completed WALTER BUSH     Inpatient consult to Urology  Once        Provider:  (Not yet assigned)    Completed CANDIDA STUART JR     Inpatient consult to Infectious Diseases  Once        Provider:  (Not yet assigned)    Completed JERMAINE DE LA CRUZ     Inpatient consult to Kidney Transplant Surgery  Once        Provider:  (Not yet assigned)    Acknowledged JOSEPH HUDDLESTON            Pending Diagnostic Studies:       Procedure Component Value Units Date/Time    Amylase [[id number]] Collected: 12/04/22 2020    Order Status: Sent Lab Status: In process Updated: 12/04/22 2021    Specimen: Blood     CMV DNA, quantitative, PCR [[id number]] Collected: 12/08/22 0616    Order Status: Sent Lab Status: In process Updated: 12/08/22 0627    Specimen: Blood     Lipase [[id number]] Collected: 12/04/22 2020    Order Status: Sent Lab Status: In process Updated: 12/04/22 2021    Specimen: Blood     Renal function panel [[id number]] Collected: 12/04/22 2020    Order Status: Sent Lab Status: In process Updated: 12/04/22 2021    Specimen: Blood     Specimen to Pathology, Radiology Kidney, post transplant biopsy [[id number]] Collected: 12/08/22 1512    Order Status: Sent Lab Status: In process Updated: 12/08/22 1545          Significant Diagnostic Studies: Labs:   CMP   Recent Labs   Lab 12/14/22  0643 12/15/22  0705    134*   K 3.4* 3.8    106   CO2 21* 22*    90   BUN 15 16   CREATININE 2.2* 2.1*   CALCIUM 9.6 9.4   ALBUMIN 2.1* 2.0*   ANIONGAP 10 6*   , CBC   Recent Labs   Lab 12/14/22  0643 12/15/22  0705   WBC 15.04* 11.12   HGB 9.7* 9.5*   HCT 31.0* 30.5*   * 680*    and All labs within the past 24 hours have been reviewed    Discharged Condition:  good    Disposition:     Follow Up:    Patient Instructions:   No discharge procedures on file.  Medications:  Reconciled Home Medications:      Medication List      CHANGE how you take these medications    predniSONE 5 MG tablet  Commonly known as: DELTASONE  Take 1 tablet (5 mg total) by mouth once daily.  Start taking on: December 16, 2022  What changed:   · how much to take  · how to take this  · when to take this  · additional instructions     promethazine 12.5 MG Tab  Commonly known as: PHENERGAN  Take 1 tablet (12.5 mg total) by mouth every 6 (six) hours as needed (nausea).  What changed: Another medication with the same name was removed. Continue taking this medication, and follow the directions you see here.     tacrolimus 1 MG Cap  Commonly known as: PROGRAF  Take 2 capsules (2 mg total) by mouth every morning AND 2 capsules (2 mg total) every evening.  What changed: See the new instructions.     valGANciclovir 450 mg Tab  Commonly known as: VALCYTE  Take 1 tablet (450 mg total) by mouth 2 (two) times daily. Stop 2/2/2023  What changed: when to take this        CONTINUE taking these medications    aspirin 81 MG EC tablet  Commonly known as: ECOTRIN  Take 81 mg by mouth once daily.     k phos di & mono-sod phos mono 250 mg Tab  Commonly known as: K-Phos-NeutraL  Take 1 tablet by mouth 2 (two) times a day.     magnesium oxide 400 mg (241.3 mg magnesium) tablet  Commonly known as: MAG-OX  Take 1 tablet (400 mg total) by mouth 2 (two) times daily.     metoprolol tartrate 25 MG tablet  Commonly known as: LOPRESSOR  Take 1 tablet (25 mg total) by mouth 2 (two) times daily. HOLD SBP <120 or HR <60     multivitamin per tablet  Commonly known as: THERAGRAN  Take 1 tablet by mouth once daily.     NIFEdipine 30 MG (OSM) 24 hr tablet  Commonly known as: PROCARDIA-XL  Take 1 tablet (30 mg total) by mouth once daily. HOLD FOR SBP <140     ondansetron 8 MG Tbdl  Commonly known as: ZOFRAN-ODT  DISSOLVE 1 tablet (8 mg  total) by mouth every 8 (eight) hours as needed (nausea).     rosuvastatin 10 MG tablet  Commonly known as: CRESTOR  Take 1 tablet (10 mg total) by mouth every evening.     sodium bicarbonate 650 MG tablet  Take 2 tablets (1,300 mg total) by mouth 3 (three) times daily.     sulfamethoxazole-trimethoprim 400-80mg 400-80 mg per tablet  Commonly known as: BACTRIM,SEPTRA  Take 1 tablet by mouth once daily. Stop 5/2/2023     venlafaxine 37.5 MG 24 hr capsule  Commonly known as: EFFEXOR XR  Take 1 capsule (37.5 mg total) by mouth once daily.     VITAMIN D2 50,000 unit Cap  Generic drug: ergocalciferol  Take 1 capsule (50,000 Units total) by mouth every 7 days.        STOP taking these medications    ciprofloxacin HCl 500 MG tablet  Commonly known as: CIPRO     famotidine 20 MG tablet  Commonly known as: PEPCID     mycophenolate 250 mg Cap  Commonly known as: CELLCEPT          Time spent caring for patient (Greater than 1/2 spent in direct face-to-face contact): > 30 minutes    Palmira Vivar PA-C  Kidney Transplant  [facility]

## 2022-12-09 NOTE — PROGRESS NOTES
[facility] - Transplant Stepdown  Kidney Transplant  Progress Note      Reason for Follow-up: Reassessment of Kidney, Pancreas Transplant - 11/3/2022  (#1) recipient and management of immunosuppression.    ORGAN:  RIGHT KIDNEY   Donor Type:  Donation after Brain Death   PHS Increased Risk: no   Cold Ischemia: 5 hrs  Induction Medications: Thymoglobulin      Subjective:   History of Present Illness:  Ms. Read is a 27 y.o.  female with ESRD secondary to diabetic nephropathy and HTN, now s/p SPK 11/3/22 (Thymo induction, CMV D-/R+). Her post-op course was complicated by urinary retention requiring Moncada replacement, now resolved. She was recently admitted to the hospital and discharged 11/29/22 after presenting with nausea/vomiting, GEORGE, and had fever. Infectious work up performed. Urine cx had +multiple organisms none in predominance. Kidney US with edema/possible pyelo, ID consulted. She was initially treated with broad spectrum antibiotics. She was discharged on PO cipro x 7 days at [facility]. She now presents to the ED with complaints of ongoing nausea/vomiting, decreased appetite, intermittent abdominal pain. She had ureteral stent removed outpatient yesterday 11/30/22. She was in infusion center today to give IVF but had episode of vomiting so she was sent to the ED. She denies dysuria/urgency. She denies history of gastroparesis (has had gastric emptying study in 2020 that was normal). In ED, she is noted to be tachycardic in the 130s, blood pressure 90s, along with low grade fever 100.4. EKG shows sinus tachycardia. Creatinine remains elevated above baseline. She is receiving 1L LR bolus in ED. Plan for repeat infectious work up, IVF hydration, IV antibiotics, kidney US, and CT A/P. Will also check CMV PCR.    Ms. Read is a 27 y.o. year old female who is status post Kidney, Pancreas Transplant - 11/3/2022  (#1).    Her maintenance immunosuppression consists of:   Immunosuppressants (From admission,  onward)      Start     Stop Route Frequency Ordered    12/08/22 1800  tacrolimus capsule 3 mg         -- Oral 2 times daily 12/08/22 0912            Hospital Course:  Patient admitted from the ED with n/v, GEORGE, and abdominal pain (had ureteral stent removed outpatient the day before). Pancreas US satisfactory. Infectious work up revealed UTI for for E. Faecium & Diphtheroids. Blood cxs ngtd. Continued to spike fever despite IV abx. ID consulted. UTI being treated with Zosyn/luis carlos. Dc in place for mild-mod hydro on US that appeared improved on repeat imaging but on CT the bladder was full despite proper dc placement. Urology upsized dc at bedside. Cr also remains elevated above baseline. DSA's undetected. Plan for kidney biopsy 12/8. Ct A/P obtained to evaluate patients abdominal pain. Showed possible infectious colitis, typhlitis or nonspecific colopathy. C diff PCR negative. CMV PCR negative. GI pathogens pending. Pt c/o worsening diarrhea so GI was consulted, no plan to scope at this time. Lastly, hematology was consulted for a high ferritin level found during the work-up of anemia. Hyperferritinemia most likely in the setting of sepsis and inflammation. H score 68 points, with <1% probability of Hemophagocytic syndrome. Ferritin trending down.    Interval history: No acute events overnight. Pt had kidney bx yesterday, path pending. Cr improving but remains above baseline. Per GI, tried imodium for worsening diarrhea and pt reports significant improvement. GI pathogen panel negative. Cont prn imodium. Remains afebrile. Cont Zosyn and Luis Carlos, EOT 12/10. Dc removed this am for voiding trial. Pt has had decreased oral intake. Will continue IVFs for one more day but patient educated on importance of proper hydration/nutrition. Encourage ambulation. VSS. Will continue to monitor.       Past Medical, Surgical, Family, and Social History:   Unchanged from H&P.    Scheduled Meds:   atorvastatin  40 mg Oral Daily     ganciclovir (CYTOVENE) IVPB  0.625 mg/kg Intravenous Q24H    heparin (porcine)  5,000 Units Subcutaneous Q8H    magnesium sulfate IVPB  2 g Intravenous Q2H    micafungin (MYCAMINE) IVPB  100 mg Intravenous Q24H    mupirocin   Nasal BID    piperacillin-tazobactam (ZOSYN) IVPB  4.5 g Intravenous Q8H    predniSONE  5 mg Oral Daily    psyllium husk (aspartame)  1 packet Oral Daily    sodium bicarbonate  1,300 mg Oral BID    sulfamethoxazole-trimethoprim 400-80mg  1 tablet Oral Every Mon, Wed, Fri    tacrolimus  3 mg Oral BID    venlafaxine  37.5 mg Oral Daily     Continuous Infusions:   sodium bicarbonate drip 50 mL/hr at 12/09/22 1030     PRN Meds:sodium chloride 0.9%, acetaminophen, desmopressin (DDAVP) IVPB, dextrose 10%, dextrose 10%, glucagon (human recombinant), glucose, glucose, hydrALAZINE, loperamide, ondansetron, promethazine (PHENERGAN) IVPB, sodium chloride 0.9%    Intake/Output - Last 3 Shifts         12/07 0700  12/08 0659 12/08 0700  12/09 0659 12/09 0700  12/10 0659    P.O. 400 240     I.V. (mL/kg) 1250.4 (19.6) 478.5 (7.5)     IV Piggyback 487.5 99.5     Total Intake(mL/kg) 2137.9 (33.6) 818 (12.8)     Urine (mL/kg/hr) 2545 (1.7) 3200 (2.1) 500 (2)    Stool 0 0     Total Output 2545 3200 500    Net -407.1 -2382 -500           Stool Occurrence 2 x 1 x              Review of Systems   Constitutional:  Positive for activity change, appetite change (decreased appetite) and fatigue. Negative for chills and fever.   HENT: Negative.     Eyes: Negative.    Respiratory:  Negative for shortness of breath.    Cardiovascular:  Negative for chest pain and leg swelling.   Gastrointestinal:  Positive for diarrhea (improved). Negative for abdominal distention, constipation, nausea and vomiting.   Endocrine: Negative.    Genitourinary:  Negative for decreased urine volume, dysuria and hematuria.   Musculoskeletal: Negative.    Skin:  Positive for wound.   Allergic/Immunologic: Positive for  "immunocompromised state.   Neurological:  Positive for weakness. Negative for dizziness and light-headedness.   Hematological: Negative.    Psychiatric/Behavioral:  Negative for agitation and confusion. The patient is not nervous/anxious.     Objective:     Vital Signs (Most Recent):  Temp: 99 °F (37.2 °C) (12/09/22 0922)  Pulse: (!) 114 (12/09/22 1057)  Resp: 16 (12/09/22 0922)  BP: 137/71 (12/09/22 0922)  SpO2: 100 % (12/09/22 0922)   Vital Signs (24h Range):  Temp:  [97.2 °F (36.2 °C)-99.2 °F (37.3 °C)] 99 °F (37.2 °C)  Pulse:  [100-128] 114  Resp:  [12-21] 16  SpO2:  [97 %-100 %] 100 %  BP: (110-222)/() 137/71     Weight: 63.7 kg (140 lb 6.9 oz)  Height: 5' 4" (162.6 cm)  Body mass index is 24.11 kg/m².    Physical Exam  Vitals and nursing note reviewed.   Constitutional:       General: She is not in acute distress.  HENT:      Head: Normocephalic.      Right Ear: External ear normal.      Left Ear: External ear normal.      Nose: Nose normal.   Eyes:      Conjunctiva/sclera: Conjunctivae normal.   Cardiovascular:      Rate and Rhythm: Regular rhythm. Tachycardia present.      Heart sounds: No murmur heard.    No gallop.   Pulmonary:      Effort: Pulmonary effort is normal.      Breath sounds: No wheezing or rales.   Abdominal:      General: There is no distension.      Tenderness: There is abdominal tenderness (improved). There is no guarding or rebound.   Musculoskeletal:         General: No tenderness.      Cervical back: Normal range of motion.      Right lower leg: No edema.      Left lower leg: No edema.   Skin:     General: Skin is warm.   Neurological:      Mental Status: She is alert and oriented to person, place, and time.      Motor: Weakness present.   Psychiatric:         Mood and Affect: Mood normal.         Behavior: Behavior normal.         Thought Content: Thought content normal.         Judgment: Judgment normal.       Laboratory:  CBC:   Recent Labs   Lab 12/07/22  0601 12/08/22  0616 " 12/09/22  0734   WBC 10.47 10.47 11.04   RBC 3.29* 3.18* 3.02*   HGB 9.7* 9.5* 9.2*   HCT 30.3* 29.6* 28.4*   * 616* 564*   MCV 92 93 94   MCH 29.5 29.9 30.5   MCHC 32.0 32.1 32.4     BMP:   Recent Labs   Lab 12/07/22  0601 12/08/22  0616 12/09/22  0734   GLU 91 93 102    138 135*   K 3.7 3.4* 3.5    109 106   CO2 20* 21* 18*   BUN 29* 22* 18   CREATININE 3.4* 2.8* 2.5*   CALCIUM 8.9 9.1 9.0     Labs within the past 24 hours have been reviewed.    Diagnostic Results:  US - Kidney: Results for orders placed during the hospital encounter of 12/01/22    US Transplant Kidney With Doppler    Narrative  EXAMINATION:  US TRANSPLANT KIDNEY WITH DOPPLER    CLINICAL HISTORY:  s/p SPK. increase in Cr, severe abdominal pain;    TECHNIQUE:  Transplant renal ultrasound of the right lower quadrant with color flow doppler and duplex analysis.    COMPARISON:  Renal transplant ultrasound 12/03/2022.  12/01/2022.    FINDINGS:  Status post right lower quadrant renal transplant on 11/03/2022.  The renal transplant measures 12.5 cm and demonstrates no focal parenchymal abnormality. Minimal transplant hydronephrosis.  No peritransplant fluid.    Renal arterial resistive indices are as follows: Interlobar 0.77 (previously 0.81), segmental Up 0.75 (previously 0.72), segmental Mid 0.77 (previously 0.78), segmental Low 0.76 (previously 0.80).  There is no evidence of a tardus parvus waveform. The maximum velocity in the main renal artery is 236 (previously 187) cm/sec.  Maximum velocity in the main renal vein is 139 (previously 115) centimeters/second.  RA/iliac ratio 0.97    The renal transplant venous system is unremarkable.    Bladder is decompressed around Moncada catheter.    Impression  Minimal transplant hydronephrosis.    Mild interval increased main renal artery and main renal vein peak systolic velocity compared to prior.  Recommend attention on follow-up.    Intraparenchymal resistive indices are improved compared  to multiple prior exams.    Electronically signed by resident: Laura Sifuentes  Date:    12/04/2022  Time:    15:10    Electronically signed by: Zulma Anaya MD  Date:    12/04/2022  Time:    16:05    US - Pancreas: Results for orders placed during the hospital encounter of 12/01/22    US Pancreas Transplant Post    Narrative  EXAMINATION:  US DOPPLER PANCREAS TRANSPLANT POST (XPD)    CLINICAL HISTORY:  s/p SPK; r/o pancreas infarct;    TECHNIQUE:  Grayscale, color flow, and spectral analysis was used to evaluate the pancreas allograft using transabdominal ultrasound technique.    COMPARISON:  Ultrasound from 11/25/2022    FINDINGS:  Pancreatic allograft demonstrates heterogeneous area near the pancreatic head neck, not significantly changed compared to prior examination.  There are no peripancreatic fluid collections.    Velocities:    Conduit:216 cm/s    Splenic artery:96 cm/s    Splenic vein:14 cm/s    Portal vein:109 cm/s    Superior mesenteric vein:40 cm/s    Iliac artery:204 cm/s    SMA of Y graft: 38 cm/s    Resistive indices range from 0.62-0.78.    Impression  Satisfactory Doppler evaluation of the pancreatic allograft.    Redemonstration of heterogeneous, hypoechoic area of the pancreatic head/neck with adjacent bowel.  Findings are nonspecific and continued follow-up recommended.      Electronically signed by: Jmi Cedeño MD  Date:    12/05/2022  Time:    12:30    Assessment/Plan:     * GEORGE (acute kidney injury)  - Cr remains elevated above BL  - DSA's undetected  - Kidney biopsy done 12/8, pending   - Had ureteral stent removed 11/30/22      Other specified anemias  - Hematology was consulted for a high ferritin level found during the work-up of anemia  - Hyperferritinemia most likely in the setting of sepsis and inflammation  - H score 68 points, with <1% probability of Hemophagocytic syndrome  - Ferritin trending down.      Steroid-induced hyperglycemia  - Endocrine consulted. Appreciate  "their assistance.      Sepsis  - Blood cx x 2, ngtd  - UA showed 11 WBCs, moderate bacteria, and 8 hyaline casts  - Urine cx with E. Faecium & Diphtheroids  - LA 0.6  - D/w ID, treat with dapto/luis carlos until EOT 12/10  - Repeat urine and blood cxs ngtd  - CT AP noted interval development of short segment circumferential wall thickening of the fluid-filled cecum and proximal colon with surrounding stranding and edema, perhaps infectious colitis, typhlitis or nonspecific colopathy  - C diff PCR negative  - CMV negative  - GI pathogens panel negative   - GI consulted due to fevers, diarrhea, and possible colitis on CT scan      Urinary retention with incomplete bladder emptying  - Kidney US showed mild-mod hydro, dc placed  - Repeat US 12/3 with improved hydro and creatinine improved as well  - Dc upsized by urology due CT showing enlarged transplant kidney and distended bladder despite dc in adequate position.   - Plan for voiding trial today      Status post pancreas transplantation  - Amylase/lipase WNL   - Pancreas US  satisfactory  - Repeat Pancreas US ordered to r/o infarct as source of pain/fever, satisfactory   - Monitor with daily labs      Pyelonephritis, acute  - With GEORGE  - UC now predominant for enterococcus, also diphtheroids present  - Cont zosyn/luis carlos, EOT 12/10  - Repeat urine cx ngtd      Long-term use of immunosuppressant medication  - Continue prograf and steroids. Monitor prograf level daily, monitor for toxic side effects, and adjust for therapeutic dose   - Hold cellcept for GI upset.      Prophylactic immunotherapy  - See long term use of immunosuppressant medication      At risk for opportunistic infections  - Cont OI prophylaxis per protocol.       Status post -donor kidney transplantation  - s/p SPK 11/3/22 for ESRD 2/2 DMI  - See, "GEORGE"      Diarrhea  - Ct A/P obtained to evaluate patients abdominal pain. Showed possible infectious colitis, typhlitis or nonspecific " colopathy  - C diff PCR negative  - CMV PCR negative  - GI pathogen panel negative  - GI consulted, no plan to scope at this time  - Cont prn imodium      Anemia due to chronic kidney disease  - H/H stable on AM labs  - currently menstruating  - given 1u prbc 12/3/22  - Parvo pending  - Hematology was consulted for a high ferritin level found during the work-up of anemia  - Hyperferritinemia most likely in the setting of sepsis and inflammation, and is improving  - H score 68 points, with <1% probability of Hemophagocytic syndrome.       Renovascular hypertension  - Hold antihypertensives for now as with hypotension from sepsis vs dehydration  - Assess daily and restart when appropriate      Discharge Planning: Not a candidate for d/c at this time.     BRENDA CanC  Kidney Transplant  [facility] - Transplant Stepdown

## 2022-12-10 LAB
ABO + RH BLD: NORMAL
ALBUMIN SERPL BCP-MCNC: 2.2 G/DL (ref 3.5–5.2)
AMYLASE SERPL-CCNC: 63 U/L (ref 20–110)
ANION GAP SERPL CALC-SCNC: 10 MMOL/L (ref 8–16)
BACTERIA BLD CULT: NORMAL
BACTERIA BLD CULT: NORMAL
BASOPHILS # BLD AUTO: 0.04 K/UL (ref 0–0.2)
BASOPHILS NFR BLD: 0.3 % (ref 0–1.9)
BLD GP AB SCN CELLS X3 SERPL QL: NORMAL
BUN SERPL-MCNC: 17 MG/DL (ref 6–20)
CALCIUM SERPL-MCNC: 9.1 MG/DL (ref 8.7–10.5)
CHLORIDE SERPL-SCNC: 102 MMOL/L (ref 95–110)
CO2 SERPL-SCNC: 22 MMOL/L (ref 23–29)
CREAT SERPL-MCNC: 2.4 MG/DL (ref 0.5–1.4)
DIFFERENTIAL METHOD: ABNORMAL
EOSINOPHIL # BLD AUTO: 0.1 K/UL (ref 0–0.5)
EOSINOPHIL NFR BLD: 0.8 % (ref 0–8)
ERYTHROCYTE [DISTWIDTH] IN BLOOD BY AUTOMATED COUNT: 15.6 % (ref 11.5–14.5)
EST. GFR  (NO RACE VARIABLE): 27.7 ML/MIN/1.73 M^2
GLUCOSE SERPL-MCNC: 90 MG/DL (ref 70–110)
HCT VFR BLD AUTO: 29.2 % (ref 37–48.5)
HGB BLD-MCNC: 9.6 G/DL (ref 12–16)
IMM GRANULOCYTES # BLD AUTO: 0.12 K/UL (ref 0–0.04)
IMM GRANULOCYTES NFR BLD AUTO: 1 % (ref 0–0.5)
LIPASE SERPL-CCNC: 16 U/L (ref 4–60)
LYMPHOCYTES # BLD AUTO: 0.4 K/UL (ref 1–4.8)
LYMPHOCYTES NFR BLD: 3.3 % (ref 18–48)
MAGNESIUM SERPL-MCNC: 2 MG/DL (ref 1.6–2.6)
MCH RBC QN AUTO: 29.9 PG (ref 27–31)
MCHC RBC AUTO-ENTMCNC: 32.9 G/DL (ref 32–36)
MCV RBC AUTO: 91 FL (ref 82–98)
MONOCYTES # BLD AUTO: 1.5 K/UL (ref 0.3–1)
MONOCYTES NFR BLD: 12.4 % (ref 4–15)
NEUTROPHILS # BLD AUTO: 9.7 K/UL (ref 1.8–7.7)
NEUTROPHILS NFR BLD: 82.2 % (ref 38–73)
NRBC BLD-RTO: 0 /100 WBC
PHOSPHATE SERPL-MCNC: 2.4 MG/DL (ref 2.7–4.5)
PLATELET # BLD AUTO: 611 K/UL (ref 150–450)
PMV BLD AUTO: 9.6 FL (ref 9.2–12.9)
POTASSIUM SERPL-SCNC: 3.2 MMOL/L (ref 3.5–5.1)
RBC # BLD AUTO: 3.21 M/UL (ref 4–5.4)
SODIUM SERPL-SCNC: 134 MMOL/L (ref 136–145)
TACROLIMUS BLD-MCNC: 15.5 NG/ML (ref 5–15)
WBC # BLD AUTO: 11.79 K/UL (ref 3.9–12.7)

## 2022-12-10 PROCEDURE — 25000003 PHARM REV CODE 250: Performed by: NURSE PRACTITIONER

## 2022-12-10 PROCEDURE — 63600175 PHARM REV CODE 636 W HCPCS: Performed by: NURSE PRACTITIONER

## 2022-12-10 PROCEDURE — 25000003 PHARM REV CODE 250: Performed by: INTERNAL MEDICINE

## 2022-12-10 PROCEDURE — 20600001 HC STEP DOWN PRIVATE ROOM

## 2022-12-10 PROCEDURE — 82150 ASSAY OF AMYLASE: CPT | Performed by: INTERNAL MEDICINE

## 2022-12-10 PROCEDURE — 83690 ASSAY OF LIPASE: CPT | Performed by: INTERNAL MEDICINE

## 2022-12-10 PROCEDURE — 99233 PR SUBSEQUENT HOSPITAL CARE,LEVL III: ICD-10-PCS | Mod: ,,, | Performed by: INTERNAL MEDICINE

## 2022-12-10 PROCEDURE — 86850 RBC ANTIBODY SCREEN: CPT | Performed by: INTERNAL MEDICINE

## 2022-12-10 PROCEDURE — 80069 RENAL FUNCTION PANEL: CPT | Performed by: INTERNAL MEDICINE

## 2022-12-10 PROCEDURE — 63600175 PHARM REV CODE 636 W HCPCS: Performed by: PHYSICIAN ASSISTANT

## 2022-12-10 PROCEDURE — 99233 SBSQ HOSP IP/OBS HIGH 50: CPT | Mod: ,,, | Performed by: INTERNAL MEDICINE

## 2022-12-10 PROCEDURE — 25000003 PHARM REV CODE 250: Performed by: PHYSICIAN ASSISTANT

## 2022-12-10 PROCEDURE — 63600175 PHARM REV CODE 636 W HCPCS: Performed by: INTERNAL MEDICINE

## 2022-12-10 PROCEDURE — 80197 ASSAY OF TACROLIMUS: CPT | Performed by: INTERNAL MEDICINE

## 2022-12-10 PROCEDURE — 25000242 PHARM REV CODE 250 ALT 637 W/ HCPCS: Performed by: STUDENT IN AN ORGANIZED HEALTH CARE EDUCATION/TRAINING PROGRAM

## 2022-12-10 PROCEDURE — 83735 ASSAY OF MAGNESIUM: CPT | Performed by: INTERNAL MEDICINE

## 2022-12-10 PROCEDURE — 85025 COMPLETE CBC W/AUTO DIFF WBC: CPT | Performed by: INTERNAL MEDICINE

## 2022-12-10 RX ORDER — POTASSIUM CHLORIDE 20 MEQ/1
40 TABLET, EXTENDED RELEASE ORAL ONCE
Status: COMPLETED | OUTPATIENT
Start: 2022-12-10 | End: 2022-12-10

## 2022-12-10 RX ADMIN — ATORVASTATIN CALCIUM 40 MG: 40 TABLET, FILM COATED ORAL at 08:12

## 2022-12-10 RX ADMIN — TACROLIMUS 3 MG: 1 CAPSULE ORAL at 08:12

## 2022-12-10 RX ADMIN — PSYLLIUM HUSK 1 PACKET: 3.4 POWDER ORAL at 08:12

## 2022-12-10 RX ADMIN — PREDNISONE 5 MG: 5 TABLET ORAL at 08:12

## 2022-12-10 RX ADMIN — PIPERACILLIN SODIUM AND TAZOBACTAM SODIUM 4.5 G: 4; .5 INJECTION, POWDER, LYOPHILIZED, FOR SOLUTION INTRAVENOUS at 04:12

## 2022-12-10 RX ADMIN — PIPERACILLIN SODIUM AND TAZOBACTAM SODIUM 4.5 G: 4; .5 INJECTION, POWDER, LYOPHILIZED, FOR SOLUTION INTRAVENOUS at 08:12

## 2022-12-10 RX ADMIN — SODIUM BICARBONATE 650 MG TABLET 1300 MG: at 08:12

## 2022-12-10 RX ADMIN — GANCICLOVIR SODIUM 156 MG: 50 INJECTION, POWDER, LYOPHILIZED, FOR SOLUTION INTRAVENTRICULAR at 05:12

## 2022-12-10 RX ADMIN — MUPIROCIN: 20 OINTMENT TOPICAL at 09:12

## 2022-12-10 RX ADMIN — LOPERAMIDE HYDROCHLORIDE 2 MG: 2 CAPSULE ORAL at 07:12

## 2022-12-10 RX ADMIN — PIPERACILLIN SODIUM AND TAZOBACTAM SODIUM 4.5 G: 4; .5 INJECTION, POWDER, LYOPHILIZED, FOR SOLUTION INTRAVENOUS at 12:12

## 2022-12-10 RX ADMIN — POTASSIUM CHLORIDE 40 MEQ: 1500 TABLET, EXTENDED RELEASE ORAL at 09:12

## 2022-12-10 RX ADMIN — MICAFUNGIN SODIUM 100 MG: 100 INJECTION, POWDER, LYOPHILIZED, FOR SOLUTION INTRAVENOUS at 01:12

## 2022-12-10 RX ADMIN — VENLAFAXINE HYDROCHLORIDE 37.5 MG: 37.5 CAPSULE, EXTENDED RELEASE ORAL at 08:12

## 2022-12-10 NOTE — PLAN OF CARE
Pt. AAOx4, VSS, spo2> 94% on room air. NRS on telemetry monitor. Pt. Up to restroom independently. No complaints of pain/N/V this shift. Antibiotics administered per treatment plan. Bed in low locked position, call light within reach, pt instructed to call for assistance needed, pt verbalized understanding, remains free from falls this shift. WCTM.

## 2022-12-10 NOTE — SUBJECTIVE & OBJECTIVE
Subjective:   History of Present Illness:  Ms. Read is a 27 y.o.  female with ESRD secondary to diabetic nephropathy and HTN, now s/p SPK 11/3/22 (Thymo induction, CMV D-/R+). Her post-op course was complicated by urinary retention requiring Dc replacement, now resolved. She was recently admitted to the hospital and discharged 11/29/22 after presenting with nausea/vomiting, GEORGE, and had fever. Infectious work up performed. Urine cx had +multiple organisms none in predominance. Kidney US with edema/possible pyelo, ID consulted. She was initially treated with broad spectrum antibiotics. She was discharged on PO cipro x 7 days at [facility]. She now presents to the ED with complaints of ongoing nausea/vomiting, decreased appetite, intermittent abdominal pain. She had ureteral stent removed outpatient yesterday 11/30/22. She was in infusion center today to give IVF but had episode of vomiting so she was sent to the ED. She denies dysuria/urgency. She denies history of gastroparesis (has had gastric emptying study in 2020 that was normal). In ED, she is noted to be tachycardic in the 130s, blood pressure 90s, along with low grade fever 100.4. EKG shows sinus tachycardia. Creatinine remains elevated above baseline. She is receiving 1L LR bolus in ED. Plan for repeat infectious work up, IVF hydration, IV antibiotics, kidney US, and CT A/P. Will also check CMV PCR.    Hospital Course:  Patient admitted from the ED with n/v, GEORGE, and abdominal pain (had ureteral stent removed outpatient the day before). Pancreas US satisfactory. Infectious work up revealed UTI for for E. Faecium & Diphtheroids. Blood cxs ngtd. Continued to spike fever despite IV abx. ID consulted. UTI being treated with Zosyn/luis carlos. Dc in place for mild-mod hydro on US that appeared improved on repeat imaging but on CT the bladder was full despite proper dc placement. Urology upsized dc at bedside. Cr also remains elevated above  baseline. DSA's undetected. Plan for kidney biopsy 12/8. Ct A/P obtained to evaluate patients abdominal pain. Showed possible infectious colitis, typhlitis or nonspecific colopathy. C diff PCR negative. CMV PCR negative. GI pathogens pending. Pt c/o worsening diarrhea so GI was consulted, no plan to scope at this time. Lastly, hematology was consulted for a high ferritin level found during the work-up of anemia. Hyperferritinemia most likely in the setting of sepsis and inflammation. H score 68 points, with <1% probability of Hemophagocytic syndrome. Ferritin trending down.    Interval history: No acute events overnight. Pt had kidney bx on Thursday- pending result. Cr improving but remains above baseline. Per GI, tried imodium for worsening diarrhea and pt reports significant improvement. GI pathogen panel negative. Cont prn imodium.  Moncada on Friday and on voiding trial.  Her . She is educated to continue voiding every couple hours.  Her CMV + at >47124. IV ganciclovir adjusted.         Past Medical, Surgical, Family, and Social History:   Unchanged from H&P.    Scheduled Meds:   atorvastatin  40 mg Oral Daily    ganciclovir (CYTOVENE) IVPB  2.5 mg/kg Intravenous Q24H    heparin (porcine)  5,000 Units Subcutaneous Q8H    micafungin (MYCAMINE) IVPB  100 mg Intravenous Q24H    mupirocin   Nasal BID    piperacillin-tazobactam (ZOSYN) IVPB  4.5 g Intravenous Q8H    predniSONE  5 mg Oral Daily    psyllium husk (aspartame)  1 packet Oral Daily    sodium bicarbonate  1,300 mg Oral BID    sulfamethoxazole-trimethoprim 400-80mg  1 tablet Oral Every Mon, Wed, Fri    venlafaxine  37.5 mg Oral Daily     Continuous Infusions:  PRN Meds:sodium chloride 0.9%, acetaminophen, desmopressin (DDAVP) IVPB, dextrose 10%, dextrose 10%, glucagon (human recombinant), glucose, glucose, hydrALAZINE, loperamide, ondansetron, promethazine (PHENERGAN) IVPB, sodium chloride 0.9%    Intake/Output - Last 3 Shifts         12/08 0700  12/09  "0659 12/09 0700  12/10 0659 12/10 0700 12/11 0659    P.O. 240      I.V. (mL/kg) 478.5 (7.5)      IV Piggyback 99.5      Total Intake(mL/kg) 818 (12.8)      Urine (mL/kg/hr) 3200 (2.1) 2500 (1.7) 662 (2.3)    Emesis/NG output  0     Other  0     Stool 0 0     Blood  0     Total Output 3200 2500 662    Net -2382 -2500 -662           Urine Occurrence  0 x     Stool Occurrence 1 x 1 x     Emesis Occurrence  0 x              Review of Systems   Constitutional:  Positive for  appetite change (decreased appetite) and fatigue. Negative for chills and fever.     Respiratory:  Negative for shortness of breath.    Cardiovascular:  Negative for chest pain and leg swelling.   Gastrointestinal:  Positive for diarrhea (improved). Negative for abdominal distention, constipation, nausea and vomiting.   Musculoskeletal: Negative.    Skin:  Positive for wound.   Allergic/Immunologic: Positive for immunocompromised state.   Neurological:  Positive for weakness. Negative for dizziness and light-headedness.   Hematological: Negative.    Psychiatric/Behavioral:  Negative for agitation and confusion. The patient is not nervous/anxious.   Objective:     Vital Signs (Most Recent):  Temp: 98.6 °F (37 °C) (12/10/22 1110)  Pulse: (!) 116 (12/10/22 1114)  Resp: 16 (12/10/22 1110)  BP: (!) 112/45 (12/10/22 1114)  SpO2: 99 % (12/10/22 0800)   Vital Signs (24h Range):  Temp:  [97.9 °F (36.6 °C)-98.9 °F (37.2 °C)] 98.6 °F (37 °C)  Pulse:  [] 116  Resp:  [16-20] 16  SpO2:  [97 %-100 %] 99 %  BP: (101-193)/(45-71) 112/45     Weight: 60.3 kg (132 lb 15 oz)  Height: 5' 4" (162.6 cm)  Body mass index is 22.82 kg/m².    Physical Exam  Cardiovascular:      Rate and Rhythm: Regular rhythm.      Heart sounds: No murmur heard.    No gallop.   Pulmonary:      Effort: Pulmonary effort is normal.      Breath sounds: No wheezing or rales.   Abdominal:      General: There is no distension.      Tenderness: There is abdominal tenderness (improved). There is " no guarding or rebound.   Musculoskeletal:         General: No tenderness.      Cervical back: Normal range of motion.      Right lower leg: No edema.      Left lower leg: No edema.   Skin:     General: Skin is warm.   Neurological:      Mental Status: She is alert and oriented to person, place, and time.      Motor: Weakness present.   Psychiatric:         Mood and Affect: Mood normal.         Behavior: Behavior normal.         Thought Content: Thought content normal.         Judgment: Judgment normal.

## 2022-12-10 NOTE — PLAN OF CARE
Pt aao x3. Vss. No acute distress. Mother at bedside. Tele reveals NSR-ST. Pt's K replaced as per order. Pt bladder scanned this morning to check for residual. Pt barely eating. Pt hasn't requested nausea medicine. Pt steady on her feet. See assessment for full chart details. Will continue to monitor, assess and adjust care as needed.

## 2022-12-10 NOTE — PROGRESS NOTES
[facility]  Kidney Transplant  Progress Note      Reason for Follow-up: Reassessment of Kidney, Pancreas Transplant - 11/3/2022  (#1) recipient and management of immunosuppression.        Subjective:   History of Present Illness:  Ms. Read is a 27 y.o.  female with ESRD secondary to diabetic nephropathy and HTN, now s/p SPK 11/3/22 (Thymo induction, CMV D-/R+). Her post-op course was complicated by urinary retention requiring Moncada replacement, now resolved. She was recently admitted to the hospital and discharged 11/29/22 after presenting with nausea/vomiting, GEORGE, and had fever. Infectious work up performed. Urine cx had +multiple organisms none in predominance. Kidney US with edema/possible pyelo, ID consulted. She was initially treated with broad spectrum antibiotics. She was discharged on PO cipro x 7 days at [place]. She now presents to the ED with complaints of ongoing nausea/vomiting, decreased appetite, intermittent abdominal pain. She had ureteral stent removed outpatient yesterday 11/30/22. She was in infusion center today to give IVF but had episode of vomiting so she was sent to the ED. She denies dysuria/urgency. She denies history of gastroparesis (has had gastric emptying study in 2020 that was normal). In ED, she is noted to be tachycardic in the 130s, blood pressure 90s, along with low grade fever 100.4. EKG shows sinus tachycardia. Creatinine remains elevated above baseline. She is receiving 1L LR bolus in ED. Plan for repeat infectious work up, IVF hydration, IV antibiotics, kidney US, and CT A/P. Will also check CMV PCR.    Hospital Course:  Patient admitted from the ED with n/v, GEORGE, and abdominal pain (had ureteral stent removed outpatient the day before). Pancreas US satisfactory. Infectious work up revealed UTI for for E. Faecium & Diphtheroids. Blood cxs ngtd. Continued to spike fever despite IV abx. ID consulted. UTI being treated with Zosyn/luis carlos. Moncada in  place for mild-mod hydro on US that appeared improved on repeat imaging but on CT the bladder was full despite proper dc placement. Urology upsized dc at bedside. Cr also remains elevated above baseline. DSA's undetected. Plan for kidney biopsy 12/8. Ct A/P obtained to evaluate patients abdominal pain. Showed possible infectious colitis, typhlitis or nonspecific colopathy. C diff PCR negative. CMV PCR negative. GI pathogens pending. Pt c/o worsening diarrhea so GI was consulted, no plan to scope at this time. Lastly, hematology was consulted for a high ferritin level found during the work-up of anemia. Hyperferritinemia most likely in the setting of sepsis and inflammation. H score 68 points, with <1% probability of Hemophagocytic syndrome. Ferritin trending down.    Interval history: No acute events overnight. Pt had kidney bx on Thursday- pending result. Cr improving but remains above baseline. Per GI, tried imodium for worsening diarrhea and pt reports significant improvement. GI pathogen panel negative. Cont prn imodium.  Dc on Friday and on voiding trial.  Her . She is educated to continue voiding every couple hours.  Her CMV + at >35812. IV ganciclovir adjusted.         Past Medical, Surgical, Family, and Social History:   Unchanged from H&P.    Scheduled Meds:   atorvastatin  40 mg Oral Daily    ganciclovir (CYTOVENE) IVPB  2.5 mg/kg Intravenous Q24H    heparin (porcine)  5,000 Units Subcutaneous Q8H    micafungin (MYCAMINE) IVPB  100 mg Intravenous Q24H    mupirocin   Nasal BID    piperacillin-tazobactam (ZOSYN) IVPB  4.5 g Intravenous Q8H    predniSONE  5 mg Oral Daily    psyllium husk (aspartame)  1 packet Oral Daily    sodium bicarbonate  1,300 mg Oral BID    sulfamethoxazole-trimethoprim 400-80mg  1 tablet Oral Every Mon, Wed, Fri    venlafaxine  37.5 mg Oral Daily     Continuous Infusions:  PRN Meds:sodium chloride 0.9%, acetaminophen, desmopressin (DDAVP) IVPB, dextrose 10%,  "dextrose 10%, glucagon (human recombinant), glucose, glucose, hydrALAZINE, loperamide, ondansetron, promethazine (PHENERGAN) IVPB, sodium chloride 0.9%    Intake/Output - Last 3 Shifts         12/08 0700 12/09 0659 12/09 0700  12/10 0659 12/10 0700  12/11 0659    P.O. 240      I.V. (mL/kg) 478.5 (7.5)      IV Piggyback 99.5      Total Intake(mL/kg) 818 (12.8)      Urine (mL/kg/hr) 3200 (2.1) 2500 (1.7) 662 (2.3)    Emesis/NG output  0     Other  0     Stool 0 0     Blood  0     Total Output 3200 2500 662    Net -2382 -2500 -662           Urine Occurrence  0 x     Stool Occurrence 1 x 1 x     Emesis Occurrence  0 x              Review of Systems   Constitutional:  Positive for  appetite change (decreased appetite) and fatigue. Negative for chills and fever.     Respiratory:  Negative for shortness of breath.    Cardiovascular:  Negative for chest pain and leg swelling.   Gastrointestinal:  Positive for diarrhea (improved). Negative for abdominal distention, constipation, nausea and vomiting.   Musculoskeletal: Negative.    Skin:  Positive for wound.   Allergic/Immunologic: Positive for immunocompromised state.   Neurological:  Positive for weakness. Negative for dizziness and light-headedness.   Hematological: Negative.    Psychiatric/Behavioral:  Negative for agitation and confusion. The patient is not nervous/anxious.   Objective:     Vital Signs (Most Recent):  Temp: 98.6 °F (37 °C) (12/10/22 1110)  Pulse: (!) 116 (12/10/22 1114)  Resp: 16 (12/10/22 1110)  BP: (!) 112/45 (12/10/22 1114)  SpO2: 99 % (12/10/22 0800)   Vital Signs (24h Range):  Temp:  [97.9 °F (36.6 °C)-98.9 °F (37.2 °C)] 98.6 °F (37 °C)  Pulse:  [] 116  Resp:  [16-20] 16  SpO2:  [97 %-100 %] 99 %  BP: (101-193)/(45-71) 112/45     Weight: 60.3 kg (132 lb 15 oz)  Height: 5' 4" (162.6 cm)  Body mass index is 22.82 kg/m².    Physical Exam  Cardiovascular:      Rate and Rhythm: Regular rhythm.      Heart sounds: No murmur heard.    No gallop. "   Pulmonary:      Effort: Pulmonary effort is normal.      Breath sounds: No wheezing or rales.   Abdominal:      General: There is no distension.      Tenderness: There is abdominal tenderness (improved). There is no guarding or rebound.   Musculoskeletal:         General: No tenderness.      Cervical back: Normal range of motion.      Right lower leg: No edema.      Left lower leg: No edema.   Skin:     General: Skin is warm.   Neurological:      Mental Status: She is alert and oriented to person, place, and time.      Motor: Weakness present.   Psychiatric:         Mood and Affect: Mood normal.         Behavior: Behavior normal.         Thought Content: Thought content normal.         Judgment: Judgment normal.    Assessment/Plan:     * GEORGE (acute kidney injury)  - Cr remains elevated above BL  - DSA's undetected  - Kidney biopsy done 12/8, pending   - Cr improving slowly   - Had ureteral stent removed 11/30/22      Sepsis  - Blood cx x 2, ngtd  - UA showed 11 WBCs, moderate bacteria, and 8 hyaline casts  - Urine cx with E. Faecium & Diphtheroids  - LA 0.6  - D/w ID, treat with dapto/luis carlos until EOT 12/10  - Repeat urine and blood cxs ngtd  - CT AP noted interval development of short segment circumferential wall thickening of the fluid-filled cecum and proximal colon with surrounding stranding and edema, perhaps infectious colitis, typhlitis or nonspecific colopathy  - C diff PCR negative  - CMV +: on IV ganciclovir   - GI pathogens panel negative   - GI signed off     Urinary retention with incomplete bladder emptying  - Kidney US showed mild-mod hydro, dc placed  - Repeat US 12/3 with improved hydro and creatinine improved as well  - Dc upsized by urology due CT showing enlarged transplant kidney and distended bladder despite dc in adequate position  - dc d/bisi yesterday .   - voiding trial with . Will consider bladder scan later today again.     Status post pancreas transplantation  -  Amylase/lipase WNL   - Pancreas US 11/25 satisfactory  - Repeat Pancreas US ordered to r/o infarct as source of pain/fever, satisfactory   - Monitor with daily labs      Pyelonephritis, acute  - With GEORGE  - UC now predominant for enterococcus, also diphtheroids present  - Cont zosyn/luis carlos, EOT 12/10  - Repeat urine cx ngtd    Long-term use of immunosuppressant medication  - Continue prograf and steroids. Monitor prograf level daily, monitor for toxic side effects, and adjust for therapeutic dose   - Hold cellcept for GI upset.      Anemia due to chronic kidney disease  - H/H stable on AM labs  - currently menstruating  - given 1u prbc 12/3/22  - Parvo pending  - Hematology was consulted for a high ferritin level found during the work-up of anemia  - Hyperferritinemia most likely in the setting of sepsis and inflammation, and is improving  - H score 68 points, with <1% probability of Hemophagocytic syndrome.       Renovascular hypertension  - Hold antihypertensives for now as with hypotension from sepsis vs dehydration  - Assess daily and restart when appropriate        Melani Mcintyre MD  Kidney Transplant  [facility]

## 2022-12-10 NOTE — PROGRESS NOTES
Left message for pt to return call to clinic.  Per MAGED Diaz CNP, pt is able to get her wisdom tooth/teeth removed with local anesthetic (novacaine, lidocaine) but it would be best if she holds off until after pregnancy if she has to have laughing gas or general anesthesia.    Gayathri Thomas RN     On IV gancyclovir at hospital

## 2022-12-11 LAB
ALBUMIN SERPL BCP-MCNC: 2.3 G/DL (ref 3.5–5.2)
AMYLASE SERPL-CCNC: 61 U/L (ref 20–110)
ANION GAP SERPL CALC-SCNC: 12 MMOL/L (ref 8–16)
BASOPHILS # BLD AUTO: 0.05 K/UL (ref 0–0.2)
BASOPHILS NFR BLD: 0.4 % (ref 0–1.9)
BUN SERPL-MCNC: 22 MG/DL (ref 6–20)
CALCIUM SERPL-MCNC: 9.5 MG/DL (ref 8.7–10.5)
CHLORIDE SERPL-SCNC: 104 MMOL/L (ref 95–110)
CO2 SERPL-SCNC: 21 MMOL/L (ref 23–29)
CREAT SERPL-MCNC: 3.4 MG/DL (ref 0.5–1.4)
DIFFERENTIAL METHOD: ABNORMAL
EOSINOPHIL # BLD AUTO: 0.2 K/UL (ref 0–0.5)
EOSINOPHIL NFR BLD: 1.5 % (ref 0–8)
ERYTHROCYTE [DISTWIDTH] IN BLOOD BY AUTOMATED COUNT: 15.5 % (ref 11.5–14.5)
EST. GFR  (NO RACE VARIABLE): 18.2 ML/MIN/1.73 M^2
GLUCOSE SERPL-MCNC: 89 MG/DL (ref 70–110)
HCT VFR BLD AUTO: 32.3 % (ref 37–48.5)
HGB BLD-MCNC: 10.6 G/DL (ref 12–16)
IMM GRANULOCYTES # BLD AUTO: 0.17 K/UL (ref 0–0.04)
IMM GRANULOCYTES NFR BLD AUTO: 1.5 % (ref 0–0.5)
LIPASE SERPL-CCNC: 19 U/L (ref 4–60)
LYMPHOCYTES # BLD AUTO: 0.3 K/UL (ref 1–4.8)
LYMPHOCYTES NFR BLD: 3 % (ref 18–48)
MAGNESIUM SERPL-MCNC: 2 MG/DL (ref 1.6–2.6)
MCH RBC QN AUTO: 30 PG (ref 27–31)
MCHC RBC AUTO-ENTMCNC: 32.8 G/DL (ref 32–36)
MCV RBC AUTO: 92 FL (ref 82–98)
MONOCYTES # BLD AUTO: 1.5 K/UL (ref 0.3–1)
MONOCYTES NFR BLD: 13.2 % (ref 4–15)
NEUTROPHILS # BLD AUTO: 9 K/UL (ref 1.8–7.7)
NEUTROPHILS NFR BLD: 80.4 % (ref 38–73)
NRBC BLD-RTO: 0 /100 WBC
PHOSPHATE SERPL-MCNC: 2.9 MG/DL (ref 2.7–4.5)
PLATELET # BLD AUTO: 758 K/UL (ref 150–450)
PMV BLD AUTO: 10 FL (ref 9.2–12.9)
POTASSIUM SERPL-SCNC: 3.6 MMOL/L (ref 3.5–5.1)
RBC # BLD AUTO: 3.53 M/UL (ref 4–5.4)
SODIUM SERPL-SCNC: 137 MMOL/L (ref 136–145)
TACROLIMUS BLD-MCNC: 6.7 NG/ML (ref 5–15)
WBC # BLD AUTO: 11.22 K/UL (ref 3.9–12.7)

## 2022-12-11 PROCEDURE — 25000003 PHARM REV CODE 250: Performed by: PHYSICIAN ASSISTANT

## 2022-12-11 PROCEDURE — 20600001 HC STEP DOWN PRIVATE ROOM

## 2022-12-11 PROCEDURE — 25000003 PHARM REV CODE 250: Performed by: INTERNAL MEDICINE

## 2022-12-11 PROCEDURE — 80069 RENAL FUNCTION PANEL: CPT | Performed by: INTERNAL MEDICINE

## 2022-12-11 PROCEDURE — 63600175 PHARM REV CODE 636 W HCPCS: Performed by: NURSE PRACTITIONER

## 2022-12-11 PROCEDURE — 82150 ASSAY OF AMYLASE: CPT | Performed by: INTERNAL MEDICINE

## 2022-12-11 PROCEDURE — 99233 PR SUBSEQUENT HOSPITAL CARE,LEVL III: ICD-10-PCS | Mod: ,,, | Performed by: INTERNAL MEDICINE

## 2022-12-11 PROCEDURE — 85025 COMPLETE CBC W/AUTO DIFF WBC: CPT | Performed by: INTERNAL MEDICINE

## 2022-12-11 PROCEDURE — 25000003 PHARM REV CODE 250: Performed by: NURSE PRACTITIONER

## 2022-12-11 PROCEDURE — 83735 ASSAY OF MAGNESIUM: CPT | Performed by: INTERNAL MEDICINE

## 2022-12-11 PROCEDURE — 99233 SBSQ HOSP IP/OBS HIGH 50: CPT | Mod: ,,, | Performed by: INTERNAL MEDICINE

## 2022-12-11 PROCEDURE — 25000242 PHARM REV CODE 250 ALT 637 W/ HCPCS: Performed by: STUDENT IN AN ORGANIZED HEALTH CARE EDUCATION/TRAINING PROGRAM

## 2022-12-11 PROCEDURE — 63600175 PHARM REV CODE 636 W HCPCS: Performed by: PHYSICIAN ASSISTANT

## 2022-12-11 PROCEDURE — 80197 ASSAY OF TACROLIMUS: CPT | Performed by: INTERNAL MEDICINE

## 2022-12-11 PROCEDURE — 83690 ASSAY OF LIPASE: CPT | Performed by: INTERNAL MEDICINE

## 2022-12-11 PROCEDURE — 36415 COLL VENOUS BLD VENIPUNCTURE: CPT | Performed by: INTERNAL MEDICINE

## 2022-12-11 PROCEDURE — 63600175 PHARM REV CODE 636 W HCPCS: Mod: JG | Performed by: INTERNAL MEDICINE

## 2022-12-11 RX ORDER — TACROLIMUS 1 MG/1
1 CAPSULE ORAL 2 TIMES DAILY
Status: DISCONTINUED | OUTPATIENT
Start: 2022-12-11 | End: 2022-12-12

## 2022-12-11 RX ORDER — SODIUM CHLORIDE 9 MG/ML
INJECTION, SOLUTION INTRAVENOUS CONTINUOUS
Status: ACTIVE | OUTPATIENT
Start: 2022-12-11 | End: 2022-12-12

## 2022-12-11 RX ADMIN — SODIUM BICARBONATE 650 MG TABLET 1300 MG: at 08:12

## 2022-12-11 RX ADMIN — LOPERAMIDE HYDROCHLORIDE 2 MG: 2 CAPSULE ORAL at 05:12

## 2022-12-11 RX ADMIN — SODIUM CHLORIDE: 9 INJECTION, SOLUTION INTRAVENOUS at 09:12

## 2022-12-11 RX ADMIN — PREDNISONE 5 MG: 5 TABLET ORAL at 09:12

## 2022-12-11 RX ADMIN — MICAFUNGIN SODIUM 100 MG: 100 INJECTION, POWDER, LYOPHILIZED, FOR SOLUTION INTRAVENOUS at 11:12

## 2022-12-11 RX ADMIN — VENLAFAXINE HYDROCHLORIDE 37.5 MG: 37.5 CAPSULE, EXTENDED RELEASE ORAL at 09:12

## 2022-12-11 RX ADMIN — MICAFUNGIN SODIUM 100 MG: 100 INJECTION, POWDER, LYOPHILIZED, FOR SOLUTION INTRAVENOUS at 01:12

## 2022-12-11 RX ADMIN — GANCICLOVIR SODIUM 156 MG: 50 INJECTION, POWDER, LYOPHILIZED, FOR SOLUTION INTRAVENTRICULAR at 05:12

## 2022-12-11 RX ADMIN — ATORVASTATIN CALCIUM 40 MG: 40 TABLET, FILM COATED ORAL at 09:12

## 2022-12-11 RX ADMIN — PSYLLIUM HUSK 1 PACKET: 3.4 POWDER ORAL at 09:12

## 2022-12-11 RX ADMIN — TACROLIMUS 1 MG: 1 CAPSULE ORAL at 05:12

## 2022-12-11 RX ADMIN — SODIUM BICARBONATE 650 MG TABLET 1300 MG: at 09:12

## 2022-12-11 RX ADMIN — TACROLIMUS 1 MG: 1 CAPSULE ORAL at 11:12

## 2022-12-11 NOTE — PLAN OF CARE
Pt aao x3. Vss. No acute distress. Tele reveals NSR-ST. Pt had a US of her kidney today which showed mild hydronephrosis. Moncada placed as per order. Pt having diarrhea. Pt has been tested for cdiff twice already. Pt restarted on prograf. Pt started on IVF. Pt steady on her feet. Mother currently at bedside. Pt menstruating. Pt having green BMs due to eating blueberries. See assessment for full chart details. Will continue to monitor, assess and adjust care as needed.

## 2022-12-11 NOTE — PLAN OF CARE
Pt AOX4, VSS, afebrile.   No c/o pain/N/V.   Telemonitor NS to ST HR 90s-110s  Zosyn administered per MAR  Micafungin administered per MAR  Pt up independently to toilet   Fall and infection precautions maintained throughout shift  Pt in lowest settings, call light w/in reach, nonskid socks when ambulating, remains free from falls. NAD. WCTM.

## 2022-12-11 NOTE — SUBJECTIVE & OBJECTIVE
Subjective:   History of Present Illness:  Ms. Read is a 27 y.o.  female with ESRD secondary to diabetic nephropathy and HTN, now s/p SPK 11/3/22 (Thymo induction, CMV D-/R+). Her post-op course was complicated by urinary retention requiring Dc replacement, now resolved. She was recently admitted to the hospital and discharged 11/29/22 after presenting with nausea/vomiting, GEORGE, and had fever. Infectious work up performed. Urine cx had +multiple organisms none in predominance. Kidney US with edema/possible pyelo, ID consulted. She was initially treated with broad spectrum antibiotics. She was discharged on PO cipro x 7 days at [place]. She now presents to the ED with complaints of ongoing nausea/vomiting, decreased appetite, intermittent abdominal pain. She had ureteral stent removed outpatient yesterday 11/30/22. She was in infusion center today to give IVF but had episode of vomiting so she was sent to the ED. She denies dysuria/urgency. She denies history of gastroparesis (has had gastric emptying study in 2020 that was normal). In ED, she is noted to be tachycardic in the 130s, blood pressure 90s, along with low grade fever 100.4. EKG shows sinus tachycardia. Creatinine remains elevated above baseline. She is receiving 1L LR bolus in ED. Plan for repeat infectious work up, IVF hydration, IV antibiotics, kidney US, and CT A/P. Will also check CMV PCR.        Hospital Course:  Patient admitted from the ED with n/v, GEORGE, and abdominal pain (had ureteral stent removed outpatient the day before). Pancreas US satisfactory. Infectious work up revealed UTI for for E. Faecium & Diphtheroids. Blood cxs ngtd. Continued to spike fever despite IV abx. ID consulted. UTI being treated with Zosyn/luis carlos. Dc in place for mild-mod hydro on US that appeared improved on repeat imaging but on CT the bladder was full despite proper dc placement. Urology upsized dc at bedside. Cr also remains elevated above  baseline. DSA's undetected. Plan for kidney biopsy 12/8. Ct A/P obtained to evaluate patients abdominal pain. Showed possible infectious colitis, typhlitis or nonspecific colopathy. C diff PCR negative. CMV PCR negative. GI pathogens pending. Pt c/o worsening diarrhea so GI was consulted, no plan to scope at this time. Lastly, hematology was consulted for a high ferritin level found during the work-up of anemia. Hyperferritinemia most likely in the setting of sepsis and inflammation. H score 68 points, with <1% probability of Hemophagocytic syndrome. Ferritin trending down.    Interval history: Today, she feels fine. However Cr up to 3.4. seems to be multifactorial (voiding issue, low oral intake and ....). Kidney Us from today showed mild hydro. Reviewed by tx surgery team. Dr Rodriges recommends dc cath placement again. Started on IVF for 12 hours, encouraged for more oral intake. Pt and her mother are not appy about dc replacement   Her CMV + at >37007. IV ganciclovir adjusted.     she denies any chest pain, shortness of breath, ENT symptoms, nausea/vomiting, dysuria, frequency, urgency, or pain over the allograft.         Past Medical, Surgical, Family, and Social History:   Unchanged from H&P.    Scheduled Meds:   atorvastatin  40 mg Oral Daily    ganciclovir (CYTOVENE) IVPB  2.5 mg/kg Intravenous Q24H    heparin (porcine)  5,000 Units Subcutaneous Q8H    micafungin (MYCAMINE) IVPB  100 mg Intravenous Q24H    predniSONE  5 mg Oral Daily    psyllium husk (aspartame)  1 packet Oral Daily    sodium bicarbonate  1,300 mg Oral BID    sulfamethoxazole-trimethoprim 400-80mg  1 tablet Oral Every Mon, Wed, Fri    tacrolimus  1 mg Oral BID    venlafaxine  37.5 mg Oral Daily     Continuous Infusions:   sodium chloride 0.9% 75 mL/hr at 12/11/22 0912     PRN Meds:sodium chloride 0.9%, acetaminophen, desmopressin (DDAVP) IVPB, dextrose 10%, dextrose 10%, glucagon (human recombinant), glucose, glucose, hydrALAZINE,  "loperamide, ondansetron, promethazine (PHENERGAN) IVPB, sodium chloride 0.9%    Intake/Output - Last 3 Shifts         12/09 0700  12/10 0659 12/10 0700 12/11 0659 12/11 0700 12/12 0659    P.O.       I.V. (mL/kg)       IV Piggyback       Total Intake(mL/kg)       Urine (mL/kg/hr) 2500 (1.7) 1262 (0.9) 400 (0.9)    Emesis/NG output 0      Other 0      Stool 0 0     Blood 0      Total Output 2500 1262 400    Net -2500 -1262 -400           Urine Occurrence 0 x      Stool Occurrence 1 x 2 x     Emesis Occurrence 0 x                 Objective:     Vital Signs (Most Recent):  Temp: 98.4 °F (36.9 °C) (12/11/22 1124)  Pulse: (!) 122 (12/11/22 1124)  Resp: 16 (12/11/22 1124)  BP: 119/79 (12/11/22 1124)  SpO2: 99 % (12/11/22 1124)   Vital Signs (24h Range):  Temp:  [98.2 °F (36.8 °C)-98.7 °F (37.1 °C)] 98.4 °F (36.9 °C)  Pulse:  [] 122  Resp:  [16-18] 16  SpO2:  [98 %-99 %] 99 %  BP: (108-140)/(56-79) 119/79     Weight: 60.3 kg (132 lb 15 oz)  Height: 5' 4" (162.6 cm)  Body mass index is 22.82 kg/m².    Physical Exam  Cardiovascular:      Rate and Rhythm: Regular rhythm.      Heart sounds: No murmur heard.    No gallop.   Pulmonary:      Effort: Pulmonary effort is normal.      Breath sounds: No wheezing or rales.   Abdominal:      General: There is no distension.      Tenderness: There is mild abdominal tenderness (improved alot). There is no guarding or rebound.   Musculoskeletal:         General: No tenderness.      Cervical back: Normal range of motion.      Right lower leg: No edema.      Left lower leg: No edema.   Skin:     General: Skin is warm.   Neurological:      Mental Status: She is alert and oriented to person, place, and time.      Motor: Weakness present.   Psychiatric:         Mood and Affect: Mood normal.         Behavior: Behavior normal.         Thought Content: Thought content normal.         Judgment: Judgment normal.    "

## 2022-12-11 NOTE — PROGRESS NOTES
[facility]  Kidney Transplant  Progress Note      Reason for Follow-up: Reassessment of Kidney, Pancreas Transplant - 11/3/2022  (#1) recipient and management of immunosuppression.        Subjective:   History of Present Illness:  Ms. Read is a 27 y.o.  female with ESRD secondary to diabetic nephropathy and HTN, now s/p SPK 11/3/22 (Thymo induction, CMV D-/R+). Her post-op course was complicated by urinary retention requiring Moncada replacement, now resolved. She was recently admitted to the hospital and discharged 11/29/22 after presenting with nausea/vomiting, GEORGE, and had fever. Infectious work up performed. Urine cx had +multiple organisms none in predominance. Kidney US with edema/possible pyelo, ID consulted. She was initially treated with broad spectrum antibiotics. She was discharged on PO cipro x 7 days at [place]. She now presents to the ED with complaints of ongoing nausea/vomiting, decreased appetite, intermittent abdominal pain. She had ureteral stent removed outpatient yesterday 11/30/22. She was in infusion center today to give IVF but had episode of vomiting so she was sent to the ED. She denies dysuria/urgency. She denies history of gastroparesis (has had gastric emptying study in 2020 that was normal). In ED, she is noted to be tachycardic in the 130s, blood pressure 90s, along with low grade fever 100.4. EKG shows sinus tachycardia. Creatinine remains elevated above baseline. She is receiving 1L LR bolus in ED. Plan for repeat infectious work up, IVF hydration, IV antibiotics, kidney US, and CT A/P. Will also check CMV PCR.        Hospital Course:  Patient admitted from the ED with n/v, GEORGE, and abdominal pain (had ureteral stent removed outpatient the day before). Pancreas US satisfactory. Infectious work up revealed UTI for for E. Faecium & Diphtheroids. Blood cxs ngtd. Continued to spike fever despite IV abx. ID consulted. UTI being treated with Zosyn/luis carlos. Moncada  in place for mild-mod hydro on US that appeared improved on repeat imaging but on CT the bladder was full despite proper dc placement. Urology upsized dc at bedside. Cr also remains elevated above baseline. DSA's undetected. Plan for kidney biopsy 12/8. Ct A/P obtained to evaluate patients abdominal pain. Showed possible infectious colitis, typhlitis or nonspecific colopathy. C diff PCR negative. CMV PCR negative. GI pathogens pending. Pt c/o worsening diarrhea so GI was consulted, no plan to scope at this time. Lastly, hematology was consulted for a high ferritin level found during the work-up of anemia. Hyperferritinemia most likely in the setting of sepsis and inflammation. H score 68 points, with <1% probability of Hemophagocytic syndrome. Ferritin trending down.    Interval history: Today, she feels fine. However Cr up to 3.4. seems to be multifactorial (voiding issue, low oral intake and high prograf level.). Kidney Us from today showed mild hydro. Reviewed by tx surgery team. Dr Rodriges recommends dc cath placement again. Started on IVF for 12 hours, encouraged for more oral intake. Pt and her mother are not appy about dc replacement   Her CMV + at >55011. IV ganciclovir adjusted.     she denies any chest pain, shortness of breath, ENT symptoms, nausea/vomiting, dysuria, frequency, urgency, or pain over the allograft.         Past Medical, Surgical, Family, and Social History:   Unchanged from H&P.    Scheduled Meds:   atorvastatin  40 mg Oral Daily    ganciclovir (CYTOVENE) IVPB  2.5 mg/kg Intravenous Q24H    heparin (porcine)  5,000 Units Subcutaneous Q8H    micafungin (MYCAMINE) IVPB  100 mg Intravenous Q24H    predniSONE  5 mg Oral Daily    psyllium husk (aspartame)  1 packet Oral Daily    sodium bicarbonate  1,300 mg Oral BID    sulfamethoxazole-trimethoprim 400-80mg  1 tablet Oral Every Mon, Wed, Fri    tacrolimus  1 mg Oral BID    venlafaxine  37.5 mg Oral Daily     Continuous  "Infusions:   sodium chloride 0.9% 75 mL/hr at 12/11/22 0912     PRN Meds:sodium chloride 0.9%, acetaminophen, desmopressin (DDAVP) IVPB, dextrose 10%, dextrose 10%, glucagon (human recombinant), glucose, glucose, hydrALAZINE, loperamide, ondansetron, promethazine (PHENERGAN) IVPB, sodium chloride 0.9%    Intake/Output - Last 3 Shifts         12/09 0700  12/10 0659 12/10 0700  12/11 0659 12/11 0700 12/12 0659    P.O.       I.V. (mL/kg)       IV Piggyback       Total Intake(mL/kg)       Urine (mL/kg/hr) 2500 (1.7) 1262 (0.9) 400 (0.9)    Emesis/NG output 0      Other 0      Stool 0 0     Blood 0      Total Output 2500 1262 400    Net -2500 -1262 -400           Urine Occurrence 0 x      Stool Occurrence 1 x 2 x     Emesis Occurrence 0 x                 Objective:     Vital Signs (Most Recent):  Temp: 98.4 °F (36.9 °C) (12/11/22 1124)  Pulse: (!) 122 (12/11/22 1124)  Resp: 16 (12/11/22 1124)  BP: 119/79 (12/11/22 1124)  SpO2: 99 % (12/11/22 1124)   Vital Signs (24h Range):  Temp:  [98.2 °F (36.8 °C)-98.7 °F (37.1 °C)] 98.4 °F (36.9 °C)  Pulse:  [] 122  Resp:  [16-18] 16  SpO2:  [98 %-99 %] 99 %  BP: (108-140)/(56-79) 119/79     Weight: 60.3 kg (132 lb 15 oz)  Height: 5' 4" (162.6 cm)  Body mass index is 22.82 kg/m².    Physical Exam  Cardiovascular:      Rate and Rhythm: Regular rhythm.      Heart sounds: No murmur heard.    No gallop.   Pulmonary:      Effort: Pulmonary effort is normal.      Breath sounds: No wheezing or rales.   Abdominal:      General: There is no distension.      Tenderness: There is mild abdominal tenderness (improved alot). There is no guarding or rebound.   Musculoskeletal:         General: No tenderness.      Cervical back: Normal range of motion.      Right lower leg: No edema.      Left lower leg: No edema.   Skin:     General: Skin is warm.   Neurological:      Mental Status: She is alert and oriented to person, place, and time.      Motor: Weakness present.   Psychiatric:         " Mood and Affect: Mood normal.         Behavior: Behavior normal.         Thought Content: Thought content normal.         Judgment: Judgment normal.      Assessment/Plan:     * GEORGE (acute kidney injury)  - Cr remains elevated above BL  - DSA's undetected  - Kidney biopsy done 12/8, pending   - Cr improving slowly initially but it is 3.2 today    - Kidney US showed mild hydro. tx surgery team recommended dc cath placement today  - on gentle IVF        Sepsis  - Blood cx x 2, ngtd  - UA showed 11 WBCs, moderate bacteria, and 8 hyaline casts  - Urine cx with E. Faecium & Diphtheroids  - LA 0.6  - D/w ID, treat with dapto/luis carlos until EOT 12/10  - Repeat urine and blood cxs ngtd  - CT AP noted interval development of short segment circumferential wall thickening of the fluid-filled cecum and proximal colon with surrounding stranding and edema, perhaps infectious colitis, typhlitis or nonspecific colopathy  - C diff PCR negative  - CMV +: on IV ganciclovir   - GI pathogens panel negative   - GI signed off      Urinary retention with incomplete bladder emptying  - Kidney US showed mild-mod hydro, dc placed  - Repeat US 12/3 with improved hydro and creatinine improved as well  - Dc upsized by urology due CT showing enlarged transplant kidney and distended bladder despite dc in adequate position  - dc d/bisi on Friday  - will be replaced today       Status post pancreas transplantation  - Amylase/lipase WNL   - Pancreas US 11/25 satisfactory  - Repeat Pancreas US ordered to r/o infarct as source of pain/fever, satisfactory   - Monitor with daily labs         Long-term use of immunosuppressant medication  - Continue prograf and steroids. Monitor prograf level daily, monitor for toxic side effects, and adjust for therapeutic dose   - Hold cellcept for GI upset.        Anemia due to chronic kidney disease  - H/H stable on AM labs  - currently menstruating  - given 1u prbc 12/3/22  - Parvo pending  - Hematology was  consulted for a high ferritin level found during the work-up of anemia  - Hyperferritinemia most likely in the setting of sepsis and inflammation, and is improving  - H score 68 points, with <1% probability of Hemophagocytic syndrome.          Melani Mcintyre MD  Kidney Transplant  [facility]

## 2022-12-12 PROBLEM — B25.9 CMV (CYTOMEGALOVIRUS) INFECTION: Status: ACTIVE | Noted: 2022-12-12

## 2022-12-12 PROBLEM — N10 PYELONEPHRITIS, ACUTE: Status: RESOLVED | Noted: 2022-11-26 | Resolved: 2022-12-12

## 2022-12-12 PROBLEM — R50.9 FEVER OF UNDETERMINED ORIGIN: Status: ACTIVE | Noted: 2022-12-12

## 2022-12-12 PROBLEM — A41.81 SEPSIS DUE TO ENTEROCOCCUS: Status: RESOLVED | Noted: 2022-12-01 | Resolved: 2022-12-12

## 2022-12-12 PROBLEM — R11.2 NAUSEA AND VOMITING: Status: ACTIVE | Noted: 2022-12-12

## 2022-12-12 PROBLEM — R11.14 BILIOUS VOMITING WITH NAUSEA: Status: ACTIVE | Noted: 2022-12-12

## 2022-12-12 LAB
ALBUMIN SERPL BCP-MCNC: 2.2 G/DL (ref 3.5–5.2)
AMYLASE SERPL-CCNC: 49 U/L (ref 20–110)
ANION GAP SERPL CALC-SCNC: 10 MMOL/L (ref 8–16)
BACTERIA #/AREA URNS AUTO: ABNORMAL /HPF
BASOPHILS # BLD AUTO: 0.06 K/UL (ref 0–0.2)
BASOPHILS NFR BLD: 0.5 % (ref 0–1.9)
BILIRUB UR QL STRIP: NEGATIVE
BUN SERPL-MCNC: 17 MG/DL (ref 6–20)
CALCIUM SERPL-MCNC: 9.9 MG/DL (ref 8.7–10.5)
CHLORIDE SERPL-SCNC: 107 MMOL/L (ref 95–110)
CLARITY UR REFRACT.AUTO: CLEAR
CO2 SERPL-SCNC: 19 MMOL/L (ref 23–29)
COLOR UR AUTO: COLORLESS
CREAT SERPL-MCNC: 2.8 MG/DL (ref 0.5–1.4)
DIFFERENTIAL METHOD: ABNORMAL
EOSINOPHIL # BLD AUTO: 0.2 K/UL (ref 0–0.5)
EOSINOPHIL NFR BLD: 1.9 % (ref 0–8)
ERYTHROCYTE [DISTWIDTH] IN BLOOD BY AUTOMATED COUNT: 15.8 % (ref 11.5–14.5)
EST. GFR  (NO RACE VARIABLE): 23 ML/MIN/1.73 M^2
GLUCOSE SERPL-MCNC: 112 MG/DL (ref 70–110)
GLUCOSE UR QL STRIP: ABNORMAL
HCT VFR BLD AUTO: 32.1 % (ref 37–48.5)
HGB BLD-MCNC: 10 G/DL (ref 12–16)
HGB UR QL STRIP: ABNORMAL
HYALINE CASTS UR QL AUTO: 3 /LPF
IMM GRANULOCYTES # BLD AUTO: 0.24 K/UL (ref 0–0.04)
IMM GRANULOCYTES NFR BLD AUTO: 2.2 % (ref 0–0.5)
KETONES UR QL STRIP: NEGATIVE
LACTATE SERPL-SCNC: 1.4 MMOL/L (ref 0.5–2.2)
LEUKOCYTE ESTERASE UR QL STRIP: NEGATIVE
LIPASE SERPL-CCNC: 11 U/L (ref 4–60)
LYMPHOCYTES # BLD AUTO: 0.3 K/UL (ref 1–4.8)
LYMPHOCYTES NFR BLD: 2.4 % (ref 18–48)
MAGNESIUM SERPL-MCNC: 1.8 MG/DL (ref 1.6–2.6)
MCH RBC QN AUTO: 29.4 PG (ref 27–31)
MCHC RBC AUTO-ENTMCNC: 31.2 G/DL (ref 32–36)
MCV RBC AUTO: 94 FL (ref 82–98)
MICROSCOPIC COMMENT: ABNORMAL
MONOCYTES # BLD AUTO: 1.4 K/UL (ref 0.3–1)
MONOCYTES NFR BLD: 13.1 % (ref 4–15)
NEUTROPHILS # BLD AUTO: 8.8 K/UL (ref 1.8–7.7)
NEUTROPHILS NFR BLD: 79.9 % (ref 38–73)
NITRITE UR QL STRIP: NEGATIVE
NON-SQ EPI CELLS #/AREA URNS AUTO: 2 /HPF
NRBC BLD-RTO: 0 /100 WBC
PH UR STRIP: 8 [PH] (ref 5–8)
PHOSPHATE SERPL-MCNC: 2.2 MG/DL (ref 2.7–4.5)
PLATELET # BLD AUTO: 694 K/UL (ref 150–450)
PMV BLD AUTO: 9.8 FL (ref 9.2–12.9)
POTASSIUM SERPL-SCNC: 3.7 MMOL/L (ref 3.5–5.1)
PROT UR QL STRIP: ABNORMAL
RBC # BLD AUTO: 3.4 M/UL (ref 4–5.4)
RBC #/AREA URNS AUTO: 22 /HPF (ref 0–4)
SODIUM SERPL-SCNC: 136 MMOL/L (ref 136–145)
SP GR UR STRIP: 1 (ref 1–1.03)
TACROLIMUS BLD-MCNC: 3 NG/ML (ref 5–15)
URN SPEC COLLECT METH UR: ABNORMAL
WBC # BLD AUTO: 11.02 K/UL (ref 3.9–12.7)
WBC #/AREA URNS AUTO: 23 /HPF (ref 0–5)

## 2022-12-12 PROCEDURE — 99233 SBSQ HOSP IP/OBS HIGH 50: CPT | Mod: ,,, | Performed by: PHYSICIAN ASSISTANT

## 2022-12-12 PROCEDURE — 80197 ASSAY OF TACROLIMUS: CPT | Performed by: INTERNAL MEDICINE

## 2022-12-12 PROCEDURE — 87086 URINE CULTURE/COLONY COUNT: CPT | Performed by: PHYSICIAN ASSISTANT

## 2022-12-12 PROCEDURE — 36415 COLL VENOUS BLD VENIPUNCTURE: CPT | Performed by: INTERNAL MEDICINE

## 2022-12-12 PROCEDURE — 25000003 PHARM REV CODE 250: Performed by: INTERNAL MEDICINE

## 2022-12-12 PROCEDURE — 87799 DETECT AGENT NOS DNA QUANT: CPT | Performed by: INTERNAL MEDICINE

## 2022-12-12 PROCEDURE — 63600175 PHARM REV CODE 636 W HCPCS: Performed by: PHYSICIAN ASSISTANT

## 2022-12-12 PROCEDURE — 25000003 PHARM REV CODE 250: Performed by: PHYSICIAN ASSISTANT

## 2022-12-12 PROCEDURE — 25000003 PHARM REV CODE 250: Performed by: NURSE PRACTITIONER

## 2022-12-12 PROCEDURE — 82150 ASSAY OF AMYLASE: CPT | Performed by: INTERNAL MEDICINE

## 2022-12-12 PROCEDURE — 20600001 HC STEP DOWN PRIVATE ROOM

## 2022-12-12 PROCEDURE — 83690 ASSAY OF LIPASE: CPT | Performed by: INTERNAL MEDICINE

## 2022-12-12 PROCEDURE — 87040 BLOOD CULTURE FOR BACTERIA: CPT | Mod: 59 | Performed by: PHYSICIAN ASSISTANT

## 2022-12-12 PROCEDURE — 83735 ASSAY OF MAGNESIUM: CPT | Performed by: INTERNAL MEDICINE

## 2022-12-12 PROCEDURE — 63600175 PHARM REV CODE 636 W HCPCS: Mod: JG | Performed by: INTERNAL MEDICINE

## 2022-12-12 PROCEDURE — 83605 ASSAY OF LACTIC ACID: CPT | Performed by: PHYSICIAN ASSISTANT

## 2022-12-12 PROCEDURE — 80069 RENAL FUNCTION PANEL: CPT | Performed by: INTERNAL MEDICINE

## 2022-12-12 PROCEDURE — 63600175 PHARM REV CODE 636 W HCPCS: Performed by: NURSE PRACTITIONER

## 2022-12-12 PROCEDURE — 25000242 PHARM REV CODE 250 ALT 637 W/ HCPCS: Performed by: STUDENT IN AN ORGANIZED HEALTH CARE EDUCATION/TRAINING PROGRAM

## 2022-12-12 PROCEDURE — 99233 PR SUBSEQUENT HOSPITAL CARE,LEVL III: ICD-10-PCS | Mod: ,,, | Performed by: PHYSICIAN ASSISTANT

## 2022-12-12 PROCEDURE — 85025 COMPLETE CBC W/AUTO DIFF WBC: CPT | Performed by: INTERNAL MEDICINE

## 2022-12-12 PROCEDURE — 81001 URINALYSIS AUTO W/SCOPE: CPT | Performed by: PHYSICIAN ASSISTANT

## 2022-12-12 RX ORDER — TACROLIMUS 1 MG/1
3 CAPSULE ORAL 2 TIMES DAILY
Status: DISCONTINUED | OUTPATIENT
Start: 2022-12-12 | End: 2022-12-13

## 2022-12-12 RX ORDER — TACROLIMUS 1 MG/1
2 CAPSULE ORAL ONCE
Status: COMPLETED | OUTPATIENT
Start: 2022-12-12 | End: 2022-12-12

## 2022-12-12 RX ADMIN — TACROLIMUS 3 MG: 1 CAPSULE ORAL at 05:12

## 2022-12-12 RX ADMIN — LOPERAMIDE HYDROCHLORIDE 2 MG: 2 CAPSULE ORAL at 03:12

## 2022-12-12 RX ADMIN — LOPERAMIDE HYDROCHLORIDE 2 MG: 2 CAPSULE ORAL at 06:12

## 2022-12-12 RX ADMIN — SODIUM BICARBONATE 650 MG TABLET 1300 MG: at 08:12

## 2022-12-12 RX ADMIN — PREDNISONE 5 MG: 5 TABLET ORAL at 08:12

## 2022-12-12 RX ADMIN — VENLAFAXINE HYDROCHLORIDE 37.5 MG: 37.5 CAPSULE, EXTENDED RELEASE ORAL at 08:12

## 2022-12-12 RX ADMIN — ACETAMINOPHEN 650 MG: 325 TABLET ORAL at 11:12

## 2022-12-12 RX ADMIN — GANCICLOVIR SODIUM 156 MG: 50 INJECTION, POWDER, LYOPHILIZED, FOR SOLUTION INTRAVENTRICULAR at 06:12

## 2022-12-12 RX ADMIN — TACROLIMUS 1 MG: 1 CAPSULE ORAL at 08:12

## 2022-12-12 RX ADMIN — VANCOMYCIN HYDROCHLORIDE 1250 MG: 1.25 INJECTION, POWDER, LYOPHILIZED, FOR SOLUTION INTRAVENOUS at 04:12

## 2022-12-12 RX ADMIN — CEFEPIME 2 G: 2 INJECTION, POWDER, FOR SOLUTION INTRAVENOUS at 01:12

## 2022-12-12 RX ADMIN — MICAFUNGIN SODIUM 100 MG: 100 INJECTION, POWDER, LYOPHILIZED, FOR SOLUTION INTRAVENOUS at 11:12

## 2022-12-12 RX ADMIN — SULFAMETHOXAZOLE AND TRIMETHOPRIM 1 TABLET: 400; 80 TABLET ORAL at 08:12

## 2022-12-12 RX ADMIN — ATORVASTATIN CALCIUM 40 MG: 40 TABLET, FILM COATED ORAL at 08:12

## 2022-12-12 RX ADMIN — TACROLIMUS 2 MG: 1 CAPSULE ORAL at 11:12

## 2022-12-12 NOTE — ASSESSMENT & PLAN NOTE
- 12/8 CMV PCR + >30,000  - Cont IV GCV  - Will speak with ID about duration of treatment needed  - Cont weekly CMV PCR's, next due 12/15

## 2022-12-12 NOTE — ASSESSMENT & PLAN NOTE
- Cr remains elevated above BL  - DSA's undetected  - Kidney biopsy done 12/8, pending   - Had ureteral stent removed 11/30/22  - Moncada in place for retention

## 2022-12-12 NOTE — CARE UPDATE
"RAPID RESPONSE NURSE CHART REVIEW        Chart Reviewed: 12/11/2022, 10:36 PM    MRN: [id number]  Bed: 73161/27170 A    Dx: GEORGE (acute kidney injury)    Isabela Read has a past medical history of Acute kidney injury superimposed on chronic kidney disease, Anemia, Chronic hypertension with exacerbation during pregnancy in second trimester, Chronic kidney disease, Depression, Diabetes mellitus, Diabetic retinopathy, Diarrhea, Encounter for blood transfusion, End stage renal failure on dialysis, Fever, Gastroparesis, Glaucoma, Hepatomegaly, psychiatric care, Hyperlipidemia, Hypertension, Nephrotic syndrome, Nephrotic syndrome, Nonspecific reaction to tuberculin skin test without active tuberculosis, Palpitations, Poor fetal growth affecting management of mother in second trimester, Restrictive lung disease, Retinal detachment, Severe pre-eclampsia in second trimester, SIRS (systemic inflammatory response syndrome), and Type 1 diabetes mellitus with end-stage renal disease (ESRD).    Last VS: /69 (BP Location: Right arm, Patient Position: Standing)   Pulse (!) 116   Temp 98.5 °F (36.9 °C) (Oral)   Resp 18   Ht 5' 4" (1.626 m)   Wt 60.3 kg (132 lb 15 oz)   LMP 11/29/2022   SpO2 99%   Breastfeeding No   BMI 22.82 kg/m²     24H Vital Sign Range:  Temp:  [98.2 °F (36.8 °C)-99.6 °F (37.6 °C)]   Pulse:  []   Resp:  [16-18]   BP: (109-142)/(60-79)   SpO2:  [98 %-100 %]     Level of Consciousness (AVPU): alert    Recent Labs     12/09/22  0734 12/10/22  0710 12/11/22  0708   WBC 11.04 11.79 11.22   HGB 9.2* 9.6* 10.6*   HCT 28.4* 29.2* 32.3*   * 611* 758*       Recent Labs     12/09/22  0734 12/10/22  0710 12/11/22  0708   * 134* 137   K 3.5 3.2* 3.6    102 104   CO2 18* 22* 21*   CREATININE 2.5* 2.4* 3.4*    90 89   PHOS 2.2* 2.4* 2.9   MG 1.4* 2.0 2.0        No results for input(s): PH, PCO2, PO2, HCO3, POCSATURATED, BE in the last 72 hours.     OXYGEN:     Oxygen " Concentration (%): 100  (RETIRED) O2 Device (Oxygen Therapy): room air    MEWS score: 3    Bedside Meghan KRISHNAN  contacted for tachycardia. No additional concerns verbalized at this time. Instructed to call [facility] for further concerns or assistance.    Marci Richardson RN

## 2022-12-12 NOTE — SUBJECTIVE & OBJECTIVE
Subjective:   History of Present Illness:  Ms. Read is a 27 y.o.  female with ESRD secondary to diabetic nephropathy and HTN, now s/p SPK 11/3/22 (Thymo induction, CMV D-/R+). Her post-op course was complicated by urinary retention requiring Moncada replacement, now resolved. She was recently admitted to the hospital and discharged 11/29/22 after presenting with nausea/vomiting, GEORGE, and had fever. Infectious work up performed. Urine cx had +multiple organisms none in predominance. Kidney US with edema/possible pyelo, ID consulted. She was initially treated with broad spectrum antibiotics. She was discharged on PO cipro x 7 days at [place]. She now presents to the ED with complaints of ongoing nausea/vomiting, decreased appetite, intermittent abdominal pain. She had ureteral stent removed outpatient yesterday 11/30/22. She was in infusion center today to give IVF but had episode of vomiting so she was sent to the ED. She denies dysuria/urgency. She denies history of gastroparesis (has had gastric emptying study in 2020 that was normal). In ED, she is noted to be tachycardic in the 130s, blood pressure 90s, along with low grade fever 100.4. EKG shows sinus tachycardia. Creatinine remains elevated above baseline. She is receiving 1L LR bolus in ED. Plan for repeat infectious work up, IVF hydration, IV antibiotics, kidney US, and CT A/P. Will also check CMV PCR.    Ms. Read is a 27 y.o. year old female who is status post Kidney, Pancreas Transplant - 11/3/2022  (#1).    Her maintenance immunosuppression consists of:   Immunosuppressants (From admission, onward)      Start     Stop Route Frequency Ordered    12/12/22 1800  tacrolimus capsule 3 mg         -- Oral 2 times daily 12/12/22 0949    12/12/22 1045  tacrolimus capsule 2 mg         -- Oral Once 12/12/22 0949            Hospital Course:  Patient admitted from the ED with n/v, GEORGE, and abdominal pain (had ureteral stent removed outpatient the day  before). Pancreas US satisfactory. Infectious work up revealed UTI for for E. Faecium & Diphtheroids. Blood cxs ngtd. Continued to spike fever despite IV abx. ID consulted. UTI being treated with Zosyn/luis carlos. Dc in place for mild-mod hydro on US that appeared improved on repeat imaging but on CT the bladder was full despite proper dc placement. Urology upsized dc at bedside. Cr also remains elevated above baseline. DSA's undetected. Plan for kidney biopsy 12/8. Ct A/P obtained to evaluate patients abdominal pain. Showed possible infectious colitis, typhlitis or nonspecific colopathy. C diff PCR negative. CMV PCR negative. GI pathogens pending. Pt c/o worsening diarrhea so GI was consulted, no plan to scope at this time. Lastly, hematology was consulted for a high ferritin level found during the work-up of anemia. Hyperferritinemia most likely in the setting of sepsis and inflammation. H score 68 points, with <1% probability of Hemophagocytic syndrome. Ferritin trending down.    Interval history: No acute events overnight. Cr trending down, 2.2 from 3.4. Dc in place for urinary retention. CMV PCR + >30,000. On IV GCV. Will speak with ID about duration of treatment needed. Cont weekly CMV PCR's. Diarrhea improved, cont prn imodium. Encourage oral intake. Encourage ambulation. VSS. Will continue to montior.       Past Medical, Surgical, Family, and Social History:   Unchanged from H&P.    Scheduled Meds:   atorvastatin  40 mg Oral Daily    ganciclovir (CYTOVENE) IVPB  2.5 mg/kg Intravenous Q24H    heparin (porcine)  5,000 Units Subcutaneous Q8H    micafungin (MYCAMINE) IVPB  100 mg Intravenous Q24H    predniSONE  5 mg Oral Daily    psyllium husk (aspartame)  1 packet Oral Daily    sodium bicarbonate  1,300 mg Oral BID    sulfamethoxazole-trimethoprim 400-80mg  1 tablet Oral Every Mon, Wed, Fri    tacrolimus  2 mg Oral Once    tacrolimus  3 mg Oral BID    venlafaxine  37.5 mg Oral Daily     Continuous  "Infusions:  PRN Meds:sodium chloride 0.9%, acetaminophen, desmopressin (DDAVP) IVPB, dextrose 10%, dextrose 10%, glucagon (human recombinant), glucose, glucose, hydrALAZINE, loperamide, ondansetron, promethazine (PHENERGAN) IVPB, sodium chloride 0.9%    Intake/Output - Last 3 Shifts         12/10 0700  12/11 0659 12/11 0700 12/12 0659 12/12 0700 12/13 0659    Urine (mL/kg/hr) 1262 (0.9) 1750 (1.3)     Emesis/NG output       Other       Stool 0      Blood       Total Output 1262 1750     Net -1262 -1750            Stool Occurrence 2 x  1 x             Review of Systems   Constitutional:  Positive for activity change, appetite change (decreased appetite), fatigue and fever. Negative for chills.   HENT: Negative.     Eyes: Negative.    Respiratory:  Negative for shortness of breath.    Cardiovascular:  Negative for chest pain and leg swelling.   Gastrointestinal:  Positive for diarrhea (improved). Negative for abdominal distention, constipation, nausea and vomiting.   Endocrine: Negative.    Genitourinary:  Negative for decreased urine volume, dysuria and hematuria.   Musculoskeletal: Negative.    Skin:  Positive for wound.   Allergic/Immunologic: Positive for immunocompromised state.   Neurological:  Positive for weakness. Negative for dizziness and light-headedness.   Hematological: Negative.    Psychiatric/Behavioral:  Negative for agitation and confusion. The patient is not nervous/anxious.     Objective:     Vital Signs (Most Recent):  Temp: 99.7 °F (37.6 °C) (12/12/22 0815)  Pulse: (!) 126 (12/12/22 0815)  Resp: 16 (12/12/22 0815)  BP: (!) 104/59 (12/12/22 0815)  SpO2: 99 % (12/12/22 0815)   Vital Signs (24h Range):  Temp:  [98.4 °F (36.9 °C)-99.7 °F (37.6 °C)] 99.7 °F (37.6 °C)  Pulse:  [108-131] 126  Resp:  [16-18] 16  SpO2:  [99 %-100 %] 99 %  BP: (104-142)/(59-79) 104/59     Weight: 57.3 kg (126 lb 5.2 oz)  Height: 5' 4" (162.6 cm)  Body mass index is 21.68 kg/m².    Physical Exam  Vitals and nursing note " reviewed.   Constitutional:       General: She is not in acute distress.  HENT:      Head: Normocephalic.      Right Ear: External ear normal.      Left Ear: External ear normal.      Nose: Nose normal.   Eyes:      Conjunctiva/sclera: Conjunctivae normal.   Cardiovascular:      Rate and Rhythm: Regular rhythm. Tachycardia present.      Heart sounds: No murmur heard.    No gallop.   Pulmonary:      Effort: Pulmonary effort is normal.      Breath sounds: No wheezing or rales.   Abdominal:      General: There is no distension.      Tenderness: There is no abdominal tenderness. There is no guarding or rebound.   Musculoskeletal:         General: No tenderness.      Cervical back: Normal range of motion.      Right lower leg: No edema.      Left lower leg: No edema.   Skin:     General: Skin is warm.   Neurological:      Mental Status: She is alert and oriented to person, place, and time.      Motor: Weakness present.   Psychiatric:         Mood and Affect: Mood normal.         Behavior: Behavior normal.         Thought Content: Thought content normal.         Judgment: Judgment normal.       Laboratory:  CBC:   Recent Labs   Lab 12/10/22  0710 12/11/22  0708 12/12/22  0645   WBC 11.79 11.22 11.02   RBC 3.21* 3.53* 3.40*   HGB 9.6* 10.6* 10.0*   HCT 29.2* 32.3* 32.1*   * 758* 694*   MCV 91 92 94   MCH 29.9 30.0 29.4   MCHC 32.9 32.8 31.2*     BMP:   Recent Labs   Lab 12/10/22  0710 12/11/22  0708 12/12/22  0645   GLU 90 89 112*   * 137 136   K 3.2* 3.6 3.7    104 107   CO2 22* 21* 19*   BUN 17 22* 17   CREATININE 2.4* 3.4* 2.8*   CALCIUM 9.1 9.5 9.9     Labs within the past 24 hours have been reviewed.    Diagnostic Results:  US - Kidney: Results for orders placed during the hospital encounter of 12/01/22    US Transplant Kidney With Doppler    Narrative  EXAMINATION:  US TRANSPLANT KIDNEY WITH DOPPLER    CLINICAL HISTORY:  ARF and history of hydro;    TECHNIQUE:  Real time gray scale and doppler  ultrasound was performed over the patient's renal allograft.    COMPARISON:  CT abdomen pelvis 12/04/2022.    Renal transplant ultrasound 12/04/2022.    FINDINGS:  Renal allograft in the right lower quadrant.  The allograft measures 12.4 cm. Normal perfusion. Mild hydronephrosis, similar to prior.  No ureteral stent.    No fluid collections.  Bladder is partially distended and appears within normal limits.    Vasculature:    Resistive indices ranged from 0.70 to 0.77 (previously 0.75-0.77).    Main renal artery peak systolic velocity: 124 (previously 229)cm/sec with normal waveform.    Renal artery/iliac ratio: 0.69.    The main renal vein is patent.    Impression  Satisfactory doppler evaluation of renal allograft.    Stable mild hydronephrosis.    Electronically signed by resident: Bruno Browning  Date:    12/11/2022  Time:    10:33    Electronically signed by: Sarmad Mejia  Date:    12/11/2022  Time:    11:43    US - Pancreas: Results for orders placed during the hospital encounter of 12/01/22    US Pancreas Transplant Post    Narrative  EXAMINATION:  US DOPPLER PANCREAS TRANSPLANT POST (XPD)    CLINICAL HISTORY:  s/p SPK; r/o pancreas infarct;    TECHNIQUE:  Grayscale, color flow, and spectral analysis was used to evaluate the pancreas allograft using transabdominal ultrasound technique.    COMPARISON:  Ultrasound from 11/25/2022    FINDINGS:  Pancreatic allograft demonstrates heterogeneous area near the pancreatic head neck, not significantly changed compared to prior examination.  There are no peripancreatic fluid collections.    Velocities:    Conduit:216 cm/s    Splenic artery:96 cm/s    Splenic vein:14 cm/s    Portal vein:109 cm/s    Superior mesenteric vein:40 cm/s    Iliac artery:204 cm/s    SMA of Y graft: 38 cm/s    Resistive indices range from 0.62-0.78.    Impression  Satisfactory Doppler evaluation of the pancreatic allograft.    Redemonstration of heterogeneous, hypoechoic area of the pancreatic  head/neck with adjacent bowel.  Findings are nonspecific and continued follow-up recommended.      Electronically signed by: Jim Cedeño MD  Date:    12/05/2022  Time:    12:30

## 2022-12-12 NOTE — ASSESSMENT & PLAN NOTE
- Ct A/P obtained to evaluate patients abdominal pain. Showed possible infectious colitis, typhlitis or nonspecific colopathy  - C diff PCR negative  - CMV PCR > 30,000  - GI pathogen panel negative  - GI consulted, no plan to scope at this time  - Cont prn imodium

## 2022-12-12 NOTE — PLAN OF CARE
Sqih=752.3. FLORIN Suarez notified. Tylenol given. Recheck temp= 102.1. BC X2 drawn. Lactic acid=1.4. Urine sample obtained from dc and sent for UA C&S. Cefepime and Vancomycin ordered and given. Ganciclovir and Micafungin continues as ordered. Tolerating po. Had diarrhea stool X2 today. Imodium given. Refused Metamucil.  UOP adequate. Cr=2.8 dec from 3.4. K+=3.7. CO2=19. Sodium Bicarb po continues. Amylase & Lipase WDL. Pt ambulating to BR. Took shower today. Mother at .

## 2022-12-12 NOTE — ASSESSMENT & PLAN NOTE
- H/H stable on AM labs  - Recieved 1u prbc 12/3/22  - Parvo negative   - Hematology was consulted for a high ferritin level found during the work-up of anemia  - Hyperferritinemia most likely in the setting of sepsis and inflammation, and is improving  - H score 68 points, with <1% probability of Hemophagocytic syndrome.

## 2022-12-12 NOTE — PROGRESS NOTES
[facility] Step[facility]  Kidney Transplant  Progress Note      Reason for Follow-up: Reassessment of Kidney, Pancreas Transplant - 11/3/2022  (#1) recipient and management of immunosuppression.    ORGAN:  RIGHT KIDNEY   Donor Type:  Donation after Brain Death       Subjective:   History of Present Illness:  Ms. Read is a 27 y.o.  female with ESRD secondary to diabetic nephropathy and HTN, now s/p SPK 11/3/22 (Thymo induction, CMV D-/R+). Her post-op course was complicated by urinary retention requiring Moncada replacement, now resolved. She was recently admitted to the hospital and discharged 11/29/22 after presenting with nausea/vomiting, GEORGE, and had fever. Infectious work up performed. Urine cx had +multiple organisms none in predominance. Kidney US with edema/possible pyelo, ID consulted. She was initially treated with broad spectrum antibiotics. She was discharged on PO cipro x 7 days at [facility]. She now presents to the ED with complaints of ongoing nausea/vomiting, decreased appetite, intermittent abdominal pain. She had ureteral stent removed outpatient yesterday 11/30/22. She was in infusion center today to give IVF but had episode of vomiting so she was sent to the ED. She denies dysuria/urgency. She denies history of gastroparesis (has had gastric emptying study in 2020 that was normal). In ED, she is noted to be tachycardic in the 130s, blood pressure 90s, along with low grade fever 100.4. EKG shows sinus tachycardia. Creatinine remains elevated above baseline. She is receiving 1L LR bolus in ED. Plan for repeat infectious work up, IVF hydration, IV antibiotics, kidney US, and CT A/P. Will also check CMV PCR.    Ms. Read is a 27 y.o. year old female who is status post Kidney, Pancreas Transplant - 11/3/2022  (#1).    Her maintenance immunosuppression consists of:   Immunosuppressants (From admission, onward)      Start     Stop Route Frequency Ordered    12/12/22 1800  tacrolimus  capsule 3 mg         -- Oral 2 times daily 12/12/22 0949    12/12/22 1045  tacrolimus capsule 2 mg         -- Oral Once 12/12/22 0949            Hospital Course:  Patient admitted from the ED with n/v, GEORGE, and abdominal pain (had ureteral stent removed outpatient the day before). Pancreas US satisfactory. Infectious work up revealed UTI for for E. Faecium & Diphtheroids. Blood cxs ngtd. Continued to spike fever despite IV abx. ID consulted. UTI being treated with Zosyn/luis carlos. Dc in place for mild-mod hydro on US that appeared improved on repeat imaging but on CT the bladder was full despite proper dc placement. Urology upsized dc at bedside. Cr also remains elevated above baseline. DSA's undetected. Plan for kidney biopsy 12/8. Ct A/P obtained to evaluate patients abdominal pain. Showed possible infectious colitis, typhlitis or nonspecific colopathy. C diff PCR negative. CMV PCR negative. GI pathogens pending. Pt c/o worsening diarrhea so GI was consulted, no plan to scope at this time. Lastly, hematology was consulted for a high ferritin level found during the work-up of anemia. Hyperferritinemia most likely in the setting of sepsis and inflammation. H score 68 points, with <1% probability of Hemophagocytic syndrome. Ferritin trending down.    Interval history: No acute events overnight. Cr trending down, 2.2 from 3.4. Dc in place for urinary retention. CMV PCR + >30,000. On IV GCV. Will speak with ID about duration of treatment needed. Cont weekly CMV PCR's. Diarrhea improved, cont prn imodium. Encourage oral intake. Encourage ambulation. VSS. Will continue to montior.       Past Medical, Surgical, Family, and Social History:   Unchanged from H&P.    Scheduled Meds:   atorvastatin  40 mg Oral Daily    ganciclovir (CYTOVENE) IVPB  2.5 mg/kg Intravenous Q24H    heparin (porcine)  5,000 Units Subcutaneous Q8H    micafungin (MYCAMINE) IVPB  100 mg Intravenous Q24H    predniSONE  5 mg Oral Daily     psyllium husk (aspartame)  1 packet Oral Daily    sodium bicarbonate  1,300 mg Oral BID    sulfamethoxazole-trimethoprim 400-80mg  1 tablet Oral Every Mon, Wed, Fri    tacrolimus  2 mg Oral Once    tacrolimus  3 mg Oral BID    venlafaxine  37.5 mg Oral Daily     Continuous Infusions:  PRN Meds:sodium chloride 0.9%, acetaminophen, desmopressin (DDAVP) IVPB, dextrose 10%, dextrose 10%, glucagon (human recombinant), glucose, glucose, hydrALAZINE, loperamide, ondansetron, promethazine (PHENERGAN) IVPB, sodium chloride 0.9%    Intake/Output - Last 3 Shifts         12/10 0700 12/11 0659 12/11 0700 12/12 0659 12/12 0700 12/13 0659    Urine (mL/kg/hr) 1262 (0.9) 1750 (1.3)     Emesis/NG output       Other       Stool 0      Blood       Total Output 1262 1750     Net -1262 -1750            Stool Occurrence 2 x  1 x             Review of Systems   Constitutional:  Positive for activity change, appetite change (decreased appetite), fatigue and fever. Negative for chills.   HENT: Negative.     Eyes: Negative.    Respiratory:  Negative for shortness of breath.    Cardiovascular:  Negative for chest pain and leg swelling.   Gastrointestinal:  Positive for diarrhea (improved). Negative for abdominal distention, constipation, nausea and vomiting.   Endocrine: Negative.    Genitourinary:  Negative for decreased urine volume, dysuria and hematuria.   Musculoskeletal: Negative.    Skin:  Positive for wound.   Allergic/Immunologic: Positive for immunocompromised state.   Neurological:  Positive for weakness. Negative for dizziness and light-headedness.   Hematological: Negative.    Psychiatric/Behavioral:  Negative for agitation and confusion. The patient is not nervous/anxious.     Objective:     Vital Signs (Most Recent):  Temp: 99.7 °F (37.6 °C) (12/12/22 0815)  Pulse: (!) 126 (12/12/22 0815)  Resp: 16 (12/12/22 0815)  BP: (!) 104/59 (12/12/22 0815)  SpO2: 99 % (12/12/22 0815)   Vital Signs (24h Range):  Temp:  [98.4 °F  "(36.9 °C)-99.7 °F (37.6 °C)] 99.7 °F (37.6 °C)  Pulse:  [108-131] 126  Resp:  [16-18] 16  SpO2:  [99 %-100 %] 99 %  BP: (104-142)/(59-79) 104/59     Weight: 57.3 kg (126 lb 5.2 oz)  Height: 5' 4" (162.6 cm)  Body mass index is 21.68 kg/m².    Physical Exam  Vitals and nursing note reviewed.   Constitutional:       General: She is not in acute distress.  HENT:      Head: Normocephalic.      Right Ear: External ear normal.      Left Ear: External ear normal.      Nose: Nose normal.   Eyes:      Conjunctiva/sclera: Conjunctivae normal.   Cardiovascular:      Rate and Rhythm: Regular rhythm. Tachycardia present.      Heart sounds: No murmur heard.    No gallop.   Pulmonary:      Effort: Pulmonary effort is normal.      Breath sounds: No wheezing or rales.   Abdominal:      General: There is no distension.      Tenderness: There is no abdominal tenderness. There is no guarding or rebound.   Musculoskeletal:         General: No tenderness.      Cervical back: Normal range of motion.      Right lower leg: No edema.      Left lower leg: No edema.   Skin:     General: Skin is warm.   Neurological:      Mental Status: She is alert and oriented to person, place, and time.      Motor: Weakness present.   Psychiatric:         Mood and Affect: Mood normal.         Behavior: Behavior normal.         Thought Content: Thought content normal.         Judgment: Judgment normal.       Laboratory:  CBC:   Recent Labs   Lab 12/10/22  0710 12/11/22  0708 12/12/22  0645   WBC 11.79 11.22 11.02   RBC 3.21* 3.53* 3.40*   HGB 9.6* 10.6* 10.0*   HCT 29.2* 32.3* 32.1*   * 758* 694*   MCV 91 92 94   MCH 29.9 30.0 29.4   MCHC 32.9 32.8 31.2*     BMP:   Recent Labs   Lab 12/10/22  0710 12/11/22  0708 12/12/22  0645   GLU 90 89 112*   * 137 136   K 3.2* 3.6 3.7    104 107   CO2 22* 21* 19*   BUN 17 22* 17   CREATININE 2.4* 3.4* 2.8*   CALCIUM 9.1 9.5 9.9     Labs within the past 24 hours have been reviewed.    Diagnostic " Results:  US - Kidney: Results for orders placed during the hospital encounter of 12/01/22    US Transplant Kidney With Doppler    Narrative  EXAMINATION:  US TRANSPLANT KIDNEY WITH DOPPLER    CLINICAL HISTORY:  ARF and history of hydro;    TECHNIQUE:  Real time gray scale and doppler ultrasound was performed over the patient's renal allograft.    COMPARISON:  CT abdomen pelvis 12/04/2022.    Renal transplant ultrasound 12/04/2022.    FINDINGS:  Renal allograft in the right lower quadrant.  The allograft measures 12.4 cm. Normal perfusion. Mild hydronephrosis, similar to prior.  No ureteral stent.    No fluid collections.  Bladder is partially distended and appears within normal limits.    Vasculature:    Resistive indices ranged from 0.70 to 0.77 (previously 0.75-0.77).    Main renal artery peak systolic velocity: 124 (previously 229)cm/sec with normal waveform.    Renal artery/iliac ratio: 0.69.    The main renal vein is patent.    Impression  Satisfactory doppler evaluation of renal allograft.    Stable mild hydronephrosis.    Electronically signed by resident: Bruno Browning  Date:    12/11/2022  Time:    10:33    Electronically signed by: Sarmad Mejia  Date:    12/11/2022  Time:    11:43    US - Pancreas: Results for orders placed during the hospital encounter of 12/01/22    US Pancreas Transplant Post    Narrative  EXAMINATION:  US DOPPLER PANCREAS TRANSPLANT POST (XPD)    CLINICAL HISTORY:  s/p SPK; r/o pancreas infarct;    TECHNIQUE:  Grayscale, color flow, and spectral analysis was used to evaluate the pancreas allograft using transabdominal ultrasound technique.    COMPARISON:  Ultrasound from 11/25/2022    FINDINGS:  Pancreatic allograft demonstrates heterogeneous area near the pancreatic head neck, not significantly changed compared to prior examination.  There are no peripancreatic fluid collections.    Velocities:    Conduit:216 cm/s    Splenic artery:96 cm/s    Splenic vein:14 cm/s    Portal  vein:109 cm/s    Superior mesenteric vein:40 cm/s    Iliac artery:204 cm/s    SMA of Y graft: 38 cm/s    Resistive indices range from 0.62-0.78.    Impression  Satisfactory Doppler evaluation of the pancreatic allograft.    Redemonstration of heterogeneous, hypoechoic area of the pancreatic head/neck with adjacent bowel.  Findings are nonspecific and continued follow-up recommended.      Electronically signed by: Jim Cedeño MD  Date:    12/05/2022  Time:    12:30    Assessment/Plan:     * GEORGE (acute kidney injury)  - Cr remains elevated above BL  - DSA's undetected  - Kidney biopsy done 12/8, pending   - Had ureteral stent removed 11/30/22  - Dc in place for retention       Fever of undetermined origin  - This afternoon pt had fever, tmax 102  - Blood/urine cxs and lactic acid ordered   - Giving 1 dose vanc/cefe  - Possibly due to CMV infection        CMV (cytomegalovirus) infection  - 12/8 CMV PCR + >30,000  - Cont IV GCV  - Will speak with ID about duration of treatment needed  - Cont weekly CMV PCR's, next due 12/15      Other specified anemias  - Hematology was consulted for a high ferritin level found during the work-up of anemia  - Hyperferritinemia most likely in the setting of sepsis and inflammation  - H score 68 points, with <1% probability of Hemophagocytic syndrome  - Ferritin trending down.      Steroid-induced hyperglycemia  - Endocrine consulted. Appreciate their assistance.      Urinary retention with incomplete bladder emptying  - Kidney US showed mild-mod hydro, dc placed  - Repeat US 12/3 with improved hydro and creatinine improved as well  - Dc upsized by urology due CT showing enlarged transplant kidney and distended bladder despite dc in adequate position.   - Had voiding trial last week but repeat US showed continued hydro, dc replaced      Status post pancreas transplantation  - Amylase/lipase WNL   - Pancreas US 11/25 satisfactory  - Repeat Pancreas US ordered to r/o  "infarct as source of pain/fever, satisfactory   - Monitor with daily labs      Long-term use of immunosuppressant medication  - Continue prograf and steroids. Monitor prograf level daily, monitor for toxic side effects, and adjust for therapeutic dose   - Hold cellcept for GI upset.    Prophylactic immunotherapy  - See long term use of immunosuppressant medication      At risk for opportunistic infections  - Cont OI prophylaxis per protocol.       Status post -donor kidney transplantation  - s/p SPK 11/3/22 for ESRD 2/2 DMI  - See, "GEORGE"      Diarrhea  - Ct A/P obtained to evaluate patients abdominal pain. Showed possible infectious colitis, typhlitis or nonspecific colopathy  - C diff PCR negative  - CMV PCR > 30,000  - GI pathogen panel negative  - GI consulted, no plan to scope at this time  - Cont prn imodium      Anemia due to chronic kidney disease  - H/H stable on AM labs  - Recieved 1u prbc 12/3/22  - Parvo negative   - Hematology was consulted for a high ferritin level found during the work-up of anemia  - Hyperferritinemia most likely in the setting of sepsis and inflammation, and is improving  - H score 68 points, with <1% probability of Hemophagocytic syndrome.       Renovascular hypertension  - Hold antihypertensives for now as with hypotension from sepsis vs dehydration  - Assess daily and restart when appropriate      Discharge Planning: Not a candidate for d/c at this time.     Palmira Vivar PA-C  Kidney Transplant  [facility]  "

## 2022-12-12 NOTE — ASSESSMENT & PLAN NOTE
- This afternoon pt had fever, tmax 102  - Blood/urine cxs and lactic acid ordered   - Giving 1 dose vanc/cefe  - Possibly due to CMV infection

## 2022-12-12 NOTE — ASSESSMENT & PLAN NOTE
- Kidney US showed mild-mod hydro, dc placed  - Repeat US 12/3 with improved hydro and creatinine improved as well  - Dc upsized by urology due CT showing enlarged transplant kidney and distended bladder despite dc in adequate position.   - Had voiding trial last week but repeat US showed continued hydro, dc replaced

## 2022-12-13 LAB
ALBUMIN SERPL BCP-MCNC: 2.2 G/DL (ref 3.5–5.2)
AMYLASE SERPL-CCNC: 66 U/L (ref 20–110)
ANION GAP SERPL CALC-SCNC: 10 MMOL/L (ref 8–16)
BACTERIA UR CULT: NO GROWTH
BASOPHILS # BLD AUTO: 0.06 K/UL (ref 0–0.2)
BASOPHILS NFR BLD: 0.4 % (ref 0–1.9)
BUN SERPL-MCNC: 17 MG/DL (ref 6–20)
CALCIUM SERPL-MCNC: 9.8 MG/DL (ref 8.7–10.5)
CHLORIDE SERPL-SCNC: 106 MMOL/L (ref 95–110)
CO2 SERPL-SCNC: 21 MMOL/L (ref 23–29)
CREAT SERPL-MCNC: 2.7 MG/DL (ref 0.5–1.4)
DIFFERENTIAL METHOD: ABNORMAL
EOSINOPHIL # BLD AUTO: 0.4 K/UL (ref 0–0.5)
EOSINOPHIL NFR BLD: 2.7 % (ref 0–8)
ERYTHROCYTE [DISTWIDTH] IN BLOOD BY AUTOMATED COUNT: 15.7 % (ref 11.5–14.5)
EST. GFR  (NO RACE VARIABLE): 24 ML/MIN/1.73 M^2
GLUCOSE SERPL-MCNC: 92 MG/DL (ref 70–110)
HCT VFR BLD AUTO: 32.6 % (ref 37–48.5)
HGB BLD-MCNC: 10.4 G/DL (ref 12–16)
IMM GRANULOCYTES # BLD AUTO: 0.28 K/UL (ref 0–0.04)
IMM GRANULOCYTES NFR BLD AUTO: 1.9 % (ref 0–0.5)
LIPASE SERPL-CCNC: 11 U/L (ref 4–60)
LYMPHOCYTES # BLD AUTO: 0.4 K/UL (ref 1–4.8)
LYMPHOCYTES NFR BLD: 2.6 % (ref 18–48)
MAGNESIUM SERPL-MCNC: 1.7 MG/DL (ref 1.6–2.6)
MCH RBC QN AUTO: 30.1 PG (ref 27–31)
MCHC RBC AUTO-ENTMCNC: 31.9 G/DL (ref 32–36)
MCV RBC AUTO: 94 FL (ref 82–98)
MONOCYTES # BLD AUTO: 1.5 K/UL (ref 0.3–1)
MONOCYTES NFR BLD: 10.2 % (ref 4–15)
NEUTROPHILS # BLD AUTO: 11.9 K/UL (ref 1.8–7.7)
NEUTROPHILS NFR BLD: 82.2 % (ref 38–73)
NRBC BLD-RTO: 0 /100 WBC
PHOSPHATE SERPL-MCNC: 2.2 MG/DL (ref 2.7–4.5)
PLATELET # BLD AUTO: 765 K/UL (ref 150–450)
PMV BLD AUTO: 9.9 FL (ref 9.2–12.9)
POTASSIUM SERPL-SCNC: 3.7 MMOL/L (ref 3.5–5.1)
RBC # BLD AUTO: 3.46 M/UL (ref 4–5.4)
SODIUM SERPL-SCNC: 137 MMOL/L (ref 136–145)
TACROLIMUS BLD-MCNC: 8 NG/ML (ref 5–15)
VANCOMYCIN SERPL-MCNC: 24.2 UG/ML
WBC # BLD AUTO: 14.45 K/UL (ref 3.9–12.7)

## 2022-12-13 PROCEDURE — 83735 ASSAY OF MAGNESIUM: CPT | Performed by: INTERNAL MEDICINE

## 2022-12-13 PROCEDURE — 80202 ASSAY OF VANCOMYCIN: CPT | Performed by: INTERNAL MEDICINE

## 2022-12-13 PROCEDURE — 99233 PR SUBSEQUENT HOSPITAL CARE,LEVL III: ICD-10-PCS | Mod: ,,, | Performed by: PHYSICIAN ASSISTANT

## 2022-12-13 PROCEDURE — 99233 SBSQ HOSP IP/OBS HIGH 50: CPT | Mod: ,,, | Performed by: PHYSICIAN ASSISTANT

## 2022-12-13 PROCEDURE — 80069 RENAL FUNCTION PANEL: CPT | Performed by: INTERNAL MEDICINE

## 2022-12-13 PROCEDURE — 80197 ASSAY OF TACROLIMUS: CPT | Performed by: INTERNAL MEDICINE

## 2022-12-13 PROCEDURE — 63600175 PHARM REV CODE 636 W HCPCS: Performed by: NURSE PRACTITIONER

## 2022-12-13 PROCEDURE — 82150 ASSAY OF AMYLASE: CPT | Performed by: INTERNAL MEDICINE

## 2022-12-13 PROCEDURE — 63600175 PHARM REV CODE 636 W HCPCS: Performed by: PHYSICIAN ASSISTANT

## 2022-12-13 PROCEDURE — 25000003 PHARM REV CODE 250: Performed by: INTERNAL MEDICINE

## 2022-12-13 PROCEDURE — 25000003 PHARM REV CODE 250: Performed by: PHYSICIAN ASSISTANT

## 2022-12-13 PROCEDURE — 25000003 PHARM REV CODE 250: Performed by: NURSE PRACTITIONER

## 2022-12-13 PROCEDURE — 20600001 HC STEP DOWN PRIVATE ROOM

## 2022-12-13 PROCEDURE — 83690 ASSAY OF LIPASE: CPT | Performed by: INTERNAL MEDICINE

## 2022-12-13 PROCEDURE — 85025 COMPLETE CBC W/AUTO DIFF WBC: CPT | Performed by: INTERNAL MEDICINE

## 2022-12-13 PROCEDURE — 63600175 PHARM REV CODE 636 W HCPCS: Mod: JG | Performed by: INTERNAL MEDICINE

## 2022-12-13 RX ORDER — TACROLIMUS 1 MG/1
1 CAPSULE ORAL ONCE
Status: COMPLETED | OUTPATIENT
Start: 2022-12-13 | End: 2022-12-13

## 2022-12-13 RX ORDER — TACROLIMUS 1 MG/1
4 CAPSULE ORAL 2 TIMES DAILY
Status: DISCONTINUED | OUTPATIENT
Start: 2022-12-13 | End: 2022-12-15 | Stop reason: HOSPADM

## 2022-12-13 RX ADMIN — SODIUM BICARBONATE 650 MG TABLET 1300 MG: at 08:12

## 2022-12-13 RX ADMIN — TACROLIMUS 4 MG: 1 CAPSULE ORAL at 05:12

## 2022-12-13 RX ADMIN — SODIUM CHLORIDE 1000 ML: 9 INJECTION, SOLUTION INTRAVENOUS at 09:12

## 2022-12-13 RX ADMIN — GANCICLOVIR SODIUM 156 MG: 50 INJECTION, POWDER, LYOPHILIZED, FOR SOLUTION INTRAVENTRICULAR at 05:12

## 2022-12-13 RX ADMIN — PREDNISONE 5 MG: 5 TABLET ORAL at 08:12

## 2022-12-13 RX ADMIN — LOPERAMIDE HYDROCHLORIDE 2 MG: 2 CAPSULE ORAL at 04:12

## 2022-12-13 RX ADMIN — TACROLIMUS 1 MG: 1 CAPSULE ORAL at 09:12

## 2022-12-13 RX ADMIN — ACETAMINOPHEN 650 MG: 325 TABLET ORAL at 06:12

## 2022-12-13 RX ADMIN — VENLAFAXINE HYDROCHLORIDE 37.5 MG: 37.5 CAPSULE, EXTENDED RELEASE ORAL at 08:12

## 2022-12-13 RX ADMIN — SODIUM BICARBONATE 650 MG TABLET 1300 MG: at 07:12

## 2022-12-13 RX ADMIN — TACROLIMUS 3 MG: 1 CAPSULE ORAL at 08:12

## 2022-12-13 RX ADMIN — LOPERAMIDE HYDROCHLORIDE 2 MG: 2 CAPSULE ORAL at 06:12

## 2022-12-13 RX ADMIN — ATORVASTATIN CALCIUM 40 MG: 40 TABLET, FILM COATED ORAL at 09:12

## 2022-12-13 RX ADMIN — MICAFUNGIN SODIUM 100 MG: 100 INJECTION, POWDER, LYOPHILIZED, FOR SOLUTION INTRAVENOUS at 11:12

## 2022-12-13 NOTE — NURSING
Nisa MENSAH NP, notified of pt reporting tenderness to RUE where midline is placed.  No swelling or redness noted by this RN upon assessment.  This RN was told to pass on the information to day shift.  Per FLORIN Paula, someone will be by to look at it.  No new orders placed at this time.

## 2022-12-13 NOTE — PLAN OF CARE
Temp=100.5 this am. WBC=14.45 inc from 11.02. C/O pain to LINO above mid-line. Site without redness. Edema present after NS  bolus started. Mid-line removed. Pain decreased. Pt using warm paks to site. Cr=2.7 dec from 2.8. Amylase and Lipase WDL. NS 1L bolus given. Diarrhea continues. Had BM X1 today controllable. Ambulating to BR independently. Denies N/V. UOP adequate.

## 2022-12-13 NOTE — ASSESSMENT & PLAN NOTE
- Cr remains elevated above BL  - DSA's undetected  - Kidney biopsy highly suspicious for AMBR  - Plan for IVIG and repeat bx 3-4 weels  - Had ureteral stent removed 11/30/22  - Moncada in place for retention, will keep in ~2 weeks and f/u with urology outpatient

## 2022-12-13 NOTE — SUBJECTIVE & OBJECTIVE
Subjective:   History of Present Illness:  Ms. Read is a 27 y.o.  female with ESRD secondary to diabetic nephropathy and HTN, now s/p SPK 11/3/22 (Thymo induction, CMV D-/R+). Her post-op course was complicated by urinary retention requiring Moncada replacement, now resolved. She was recently admitted to the hospital and discharged 11/29/22 after presenting with nausea/vomiting, GEORGE, and had fever. Infectious work up performed. Urine cx had +multiple organisms none in predominance. Kidney US with edema/possible pyelo, ID consulted. She was initially treated with broad spectrum antibiotics. She was discharged on PO cipro x 7 days at [facility]. She now presents to the ED with complaints of ongoing nausea/vomiting, decreased appetite, intermittent abdominal pain. She had ureteral stent removed outpatient yesterday 11/30/22. She was in infusion center today to give IVF but had episode of vomiting so she was sent to the ED. She denies dysuria/urgency. She denies history of gastroparesis (has had gastric emptying study in 2020 that was normal). In ED, she is noted to be tachycardic in the 130s, blood pressure 90s, along with low grade fever 100.4. EKG shows sinus tachycardia. Creatinine remains elevated above baseline. She is receiving 1L LR bolus in ED. Plan for repeat infectious work up, IVF hydration, IV antibiotics, kidney US, and CT A/P. Will also check CMV PCR.    Ms. Read is a 27 y.o. year old female who is status post Kidney, Pancreas Transplant - 11/3/2022  (#1).    Her maintenance immunosuppression consists of:   Immunosuppressants (From admission, onward)      Start     Stop Route Frequency Ordered    12/12/22 1800  tacrolimus capsule 3 mg         -- Oral 2 times daily 12/12/22 0997            Hospital Course:  Patient admitted from the ED with n/v, GEORGE, and abdominal pain (had ureteral stent removed outpatient the day before). Pancreas US satisfactory. Infectious work up revealed UTI for for E.  Faecium & Diphtheroids. Blood cxs ngtd. Continued to spike fever despite IV abx. ID consulted. UTI being treated with Zosyn/luis carlos. Dc in place for mild-mod hydro on US that appeared improved on repeat imaging but on CT the bladder was full despite proper dc placement. Urology upsized dc at bedside. Cr also remains elevated above baseline. DSA's undetected. Plan for kidney biopsy 12/8. Ct A/P obtained to evaluate patients abdominal pain. Showed possible infectious colitis, typhlitis or nonspecific colopathy. C diff PCR negative. CMV PCR negative. GI pathogens pending. Pt c/o worsening diarrhea so GI was consulted, no plan to scope at this time. Lastly, hematology was consulted for a high ferritin level found during the work-up of anemia. Hyperferritinemia most likely in the setting of sepsis and inflammation. H score 68 points, with <1% probability of Hemophagocytic syndrome. Ferritin trending down.    Interval history: No acute events overnight. Cr stable, 2.7. Kidney bx highly suspicious for ABMR. Plan for treatment with IVIG and repeat bx in 3-4 weeks. Dc in place for urinary retention. CMV PCR + >30,000. On IV GCV, ok to transition to PO per ID. Cont weekly CMV PCR's. Yesterday pt had fever (tmax 102). Blood and urine cxs ngtd. UA, 23 WBC's, occasional bacteria, 3 hyaline casts. LA wnl. Gave 1 dose vanc/cefe. Leukocytosis. Ongoing fevers likely due to CMV. Diarrhea improved, cont prn imodium. Encourage oral intake. Encourage ambulation. VSS. Will continue to montior.       Past Medical, Surgical, Family, and Social History:   Unchanged from H&P.    Scheduled Meds:   atorvastatin  40 mg Oral Daily    ganciclovir (CYTOVENE) IVPB  2.5 mg/kg Intravenous Q24H    heparin (porcine)  5,000 Units Subcutaneous Q8H    micafungin (MYCAMINE) IVPB  100 mg Intravenous Q24H    predniSONE  5 mg Oral Daily    psyllium husk (aspartame)  1 packet Oral Daily    sodium bicarbonate  1,300 mg Oral BID     sulfamethoxazole-trimethoprim 400-80mg  1 tablet Oral Every Mon, Wed, Fri    tacrolimus  3 mg Oral BID    venlafaxine  37.5 mg Oral Daily     Continuous Infusions:  PRN Meds:sodium chloride 0.9%, acetaminophen, dextrose 10%, dextrose 10%, glucagon (human recombinant), glucose, glucose, hydrALAZINE, loperamide, ondansetron, promethazine (PHENERGAN) IVPB, sodium chloride 0.9%    Intake/Output - Last 3 Shifts         12/11 0700 12/12 0659 12/12 0700 12/13 0659 12/13 0700 12/14 0659    P.O.  1480     IV Piggyback  400     Total Intake(mL/kg)  1880 (32.8)     Urine (mL/kg/hr) 1750 (1.3) 2420 (1.8)     Stool  0     Total Output 1750 2420     Net -1750 -540            Stool Occurrence  4 x              Review of Systems   Constitutional:  Positive for activity change, appetite change (decreased appetite), fatigue and fever. Negative for chills.   HENT: Negative.     Eyes: Negative.    Respiratory:  Negative for shortness of breath.    Cardiovascular:  Negative for chest pain and leg swelling.   Gastrointestinal:  Positive for diarrhea (improved). Negative for abdominal distention, constipation, nausea and vomiting.   Endocrine: Negative.    Genitourinary:  Negative for decreased urine volume, dysuria and hematuria.   Musculoskeletal: Negative.    Skin:  Positive for wound.   Allergic/Immunologic: Positive for immunocompromised state.   Neurological:  Positive for weakness. Negative for dizziness and light-headedness.   Hematological: Negative.    Psychiatric/Behavioral:  Negative for agitation and confusion. The patient is not nervous/anxious.     Objective:     Vital Signs (Most Recent):  Temp: (!) 100.5 °F (38.1 °C) (12/13/22 0820)  Pulse: (!) 132 (12/13/22 0820)  Resp: 16 (12/13/22 0820)  BP: 103/61 (12/13/22 0820)  SpO2: 100 % (12/13/22 0820)   Vital Signs (24h Range):  Temp:  [96.6 °F (35.9 °C)-102.1 °F (38.9 °C)] 100.5 °F (38.1 °C)  Pulse:  [102-134] 132  Resp:  [12-18] 16  SpO2:  [100 %] 100 %  BP:  "(103-140)/(61-93) 103/61     Weight: 57.3 kg (126 lb 5.2 oz)  Height: 5' 4" (162.6 cm)  Body mass index is 21.68 kg/m².    Physical Exam  Vitals and nursing note reviewed.   Constitutional:       General: She is not in acute distress.  HENT:      Head: Normocephalic.      Right Ear: External ear normal.      Left Ear: External ear normal.      Nose: Nose normal.   Eyes:      Conjunctiva/sclera: Conjunctivae normal.   Cardiovascular:      Rate and Rhythm: Regular rhythm. Tachycardia present.      Heart sounds: No murmur heard.    No gallop.   Pulmonary:      Effort: Pulmonary effort is normal.      Breath sounds: No wheezing or rales.   Abdominal:      General: There is no distension.      Tenderness: There is no abdominal tenderness. There is no guarding or rebound.   Musculoskeletal:         General: Tenderness (Midline, R upper arm) present.      Cervical back: Normal range of motion.      Right lower leg: No edema.      Left lower leg: No edema.   Skin:     General: Skin is warm.   Neurological:      Mental Status: She is alert and oriented to person, place, and time.      Motor: Weakness present.   Psychiatric:         Mood and Affect: Mood normal.         Behavior: Behavior normal.         Thought Content: Thought content normal.         Judgment: Judgment normal.       Laboratory:  CBC:   Recent Labs   Lab 12/11/22  0708 12/12/22  0645 12/13/22  0550   WBC 11.22 11.02 14.45*   RBC 3.53* 3.40* 3.46*   HGB 10.6* 10.0* 10.4*   HCT 32.3* 32.1* 32.6*   * 694* 765*   MCV 92 94 94   MCH 30.0 29.4 30.1   MCHC 32.8 31.2* 31.9*     BMP:   Recent Labs   Lab 12/11/22  0708 12/12/22  0645 12/13/22  0550   GLU 89 112* 92    136 137   K 3.6 3.7 3.7    107 106   CO2 21* 19* 21*   BUN 22* 17 17   CREATININE 3.4* 2.8* 2.7*   CALCIUM 9.5 9.9 9.8     Labs within the past 24 hours have been reviewed.    Diagnostic Results:  US - Kidney: Results for orders placed during the hospital encounter of 12/01/22    US " Transplant Kidney With Doppler    Narrative  EXAMINATION:  US TRANSPLANT KIDNEY WITH DOPPLER    CLINICAL HISTORY:  ARF and history of hydro;    TECHNIQUE:  Real time gray scale and doppler ultrasound was performed over the patient's renal allograft.    COMPARISON:  CT abdomen pelvis 12/04/2022.    Renal transplant ultrasound 12/04/2022.    FINDINGS:  Renal allograft in the right lower quadrant.  The allograft measures 12.4 cm. Normal perfusion. Mild hydronephrosis, similar to prior.  No ureteral stent.    No fluid collections.  Bladder is partially distended and appears within normal limits.    Vasculature:    Resistive indices ranged from 0.70 to 0.77 (previously 0.75-0.77).    Main renal artery peak systolic velocity: 124 (previously 229)cm/sec with normal waveform.    Renal artery/iliac ratio: 0.69.    The main renal vein is patent.    Impression  Satisfactory doppler evaluation of renal allograft.    Stable mild hydronephrosis.    Electronically signed by resident: Bruno Browning  Date:    12/11/2022  Time:    10:33    Electronically signed by: Sarmad Mejia  Date:    12/11/2022  Time:    11:43    US - Pancreas: Results for orders placed during the hospital encounter of 12/01/22    US Pancreas Transplant Post    Narrative  EXAMINATION:  US DOPPLER PANCREAS TRANSPLANT POST (XPD)    CLINICAL HISTORY:  s/p SPK; r/o pancreas infarct;    TECHNIQUE:  Grayscale, color flow, and spectral analysis was used to evaluate the pancreas allograft using transabdominal ultrasound technique.    COMPARISON:  Ultrasound from 11/25/2022    FINDINGS:  Pancreatic allograft demonstrates heterogeneous area near the pancreatic head neck, not significantly changed compared to prior examination.  There are no peripancreatic fluid collections.    Velocities:    Conduit:216 cm/s    Splenic artery:96 cm/s    Splenic vein:14 cm/s    Portal vein:109 cm/s    Superior mesenteric vein:40 cm/s    Iliac artery:204 cm/s    SMA of Y graft: 38  cm/s    Resistive indices range from 0.62-0.78.    Impression  Satisfactory Doppler evaluation of the pancreatic allograft.    Redemonstration of heterogeneous, hypoechoic area of the pancreatic head/neck with adjacent bowel.  Findings are nonspecific and continued follow-up recommended.      Electronically signed by: Jim Cedeño MD  Date:    12/05/2022  Time:    12:30

## 2022-12-13 NOTE — PLAN OF CARE
Admit 12/1 for GEORGE  -s/p KPtx 11/3/22 2/2 DM/HTN  -AAO4, VSS/afebrile, NSR/ST on tele, on RA, denies pain  -Cr 2.8 down from last, 12/8 kidney bx pending   -blood cx 12/4 NGTD, repeat blood cx 12/12 pending   -urine cx 12/1 (+) enterococcus, repeat urine cx 12/12 pending   -CMV positive  -amylase/lipase WNL, panc u/s 12/5 satisfactory  -GI pathogens panel (-), Cdiff (-). CT a/p showing possible infectious colitis   -cytovene q24, micafungin q24  -immodium q6 PRN for diarrhea   -midline incision ALY, steri strips intact  -dc in place for urinary retention   -fall precautions maintained, call bell in reach

## 2022-12-13 NOTE — PLAN OF CARE
Recommendations    1. Continue Renal diet      2. Continue Boost breeze/Ensure clear (pt bringing from home) daily     3. RD to monitor and follow    Goals: Meet % EEN, EPN by RD f/u  Nutrition Goal Status: new  Communication of RD Recs:  (POC)

## 2022-12-13 NOTE — ASSESSMENT & PLAN NOTE
- 12/12 pt had fever, tmax 102  - Blood/urine cxs and lactic acid ngtd   - Gave 1 dose vanc/cefe  - Likely due to CMV infection

## 2022-12-13 NOTE — PROGRESS NOTES
[facility]  Kidney Transplant  Progress Note      Reason for Follow-up: Reassessment of Kidney, Pancreas Transplant - 11/3/2022  (#1) recipient and management of immunosuppression.    ORGAN:  RIGHT KIDNEY   Donor Type:  Donation after Brain Death       Subjective:   History of Present Illness:  Ms. Read is a 27 y.o.  female with ESRD secondary to diabetic nephropathy and HTN, now s/p SPK 11/3/22 (Thymo induction, CMV D-/R+). Her post-op course was complicated by urinary retention requiring Moncada replacement, now resolved. She was recently admitted to the hospital and discharged 11/29/22 after presenting with nausea/vomiting, GEORGE, and had fever. Infectious work up performed. Urine cx had +multiple organisms none in predominance. Kidney US with edema/possible pyelo, ID consulted. She was initially treated with broad spectrum antibiotics. She was discharged on PO cipro x 7 days at [place]. She now presents to the ED with complaints of ongoing nausea/vomiting, decreased appetite, intermittent abdominal pain. She had ureteral stent removed outpatient yesterday 11/30/22. She was in infusion center today to give IVF but had episode of vomiting so she was sent to the ED. She denies dysuria/urgency. She denies history of gastroparesis (has had gastric emptying study in 2020 that was normal). In ED, she is noted to be tachycardic in the 130s, blood pressure 90s, along with low grade fever 100.4. EKG shows sinus tachycardia. Creatinine remains elevated above baseline. She is receiving 1L LR bolus in ED. Plan for repeat infectious work up, IVF hydration, IV antibiotics, kidney US, and CT A/P. Will also check CMV PCR.    Ms. Read is a 27 y.o. year old female who is status post Kidney, Pancreas Transplant - 11/3/2022  (#1).    Her maintenance immunosuppression consists of:   Immunosuppressants (From admission, onward)      Start     Stop Route Frequency Ordered    12/12/22 1800  tacrolimus  capsule 3 mg         -- Oral 2 times daily 12/12/22 3099            Hospital Course:  Patient admitted from the ED with n/v, GEORGE, and abdominal pain (had ureteral stent removed outpatient the day before). Pancreas US satisfactory. Infectious work up revealed UTI for for E. Faecium & Diphtheroids. Blood cxs ngtd. Continued to spike fever despite IV abx. ID consulted. UTI being treated with Zosyn/luis carlos. Dc in place for mild-mod hydro on US that appeared improved on repeat imaging but on CT the bladder was full despite proper dc placement. Urology upsized dc at bedside. Cr also remains elevated above baseline. DSA's undetected. Plan for kidney biopsy 12/8. Ct A/P obtained to evaluate patients abdominal pain. Showed possible infectious colitis, typhlitis or nonspecific colopathy. C diff PCR negative. CMV PCR negative. GI pathogens pending. Pt c/o worsening diarrhea so GI was consulted, no plan to scope at this time. Lastly, hematology was consulted for a high ferritin level found during the work-up of anemia. Hyperferritinemia most likely in the setting of sepsis and inflammation. H score 68 points, with <1% probability of Hemophagocytic syndrome. Ferritin trending down.    Interval history: No acute events overnight. Cr stable, 2.7. Kidney bx highly suspicious for ABMR. Plan for treatment with IVIG and repeat bx in 3-4 weeks. Dc in place for urinary retention. Discussed pt with Urology, will keep dc in ~2 weeks and f/u outpatient. CMV PCR + >30,000. On IV GCV, ok to transition to PO per ID. Cont weekly CMV PCR's. Yesterday pt had fever (tmax 102). Blood and urine cxs ngtd. UA, 23 WBC's, occasional bacteria, 3 hyaline casts. LA wnl. Gave 1 dose vanc/cefe. Leukocytosis. Ongoing fevers likely due to CMV. Diarrhea improved, cont prn imodium. Encourage oral intake. Encourage ambulation. VSS. Will continue to montior.       Past Medical, Surgical, Family, and Social History:   Unchanged from H&P.    Scheduled  Meds:   atorvastatin  40 mg Oral Daily    ganciclovir (CYTOVENE) IVPB  2.5 mg/kg Intravenous Q24H    heparin (porcine)  5,000 Units Subcutaneous Q8H    micafungin (MYCAMINE) IVPB  100 mg Intravenous Q24H    predniSONE  5 mg Oral Daily    psyllium husk (aspartame)  1 packet Oral Daily    sodium bicarbonate  1,300 mg Oral BID    sulfamethoxazole-trimethoprim 400-80mg  1 tablet Oral Every Mon, Wed, Fri    tacrolimus  3 mg Oral BID    venlafaxine  37.5 mg Oral Daily     Continuous Infusions:  PRN Meds:sodium chloride 0.9%, acetaminophen, dextrose 10%, dextrose 10%, glucagon (human recombinant), glucose, glucose, hydrALAZINE, loperamide, ondansetron, promethazine (PHENERGAN) IVPB, sodium chloride 0.9%    Intake/Output - Last 3 Shifts         12/11 0700  12/12 0659 12/12 0700  12/13 0659 12/13 0700  12/14 0659    P.O.  1480     IV Piggyback  400     Total Intake(mL/kg)  1880 (32.8)     Urine (mL/kg/hr) 1750 (1.3) 2420 (1.8)     Stool  0     Total Output 1750 2420     Net -1750 -540            Stool Occurrence  4 x              Review of Systems   Constitutional:  Positive for activity change, appetite change (decreased appetite), fatigue and fever. Negative for chills.   HENT: Negative.     Eyes: Negative.    Respiratory:  Negative for shortness of breath.    Cardiovascular:  Negative for chest pain and leg swelling.   Gastrointestinal:  Positive for diarrhea (improved). Negative for abdominal distention, constipation, nausea and vomiting.   Endocrine: Negative.    Genitourinary:  Negative for decreased urine volume, dysuria and hematuria.   Musculoskeletal: Negative.    Skin:  Positive for wound.   Allergic/Immunologic: Positive for immunocompromised state.   Neurological:  Positive for weakness. Negative for dizziness and light-headedness.   Hematological: Negative.    Psychiatric/Behavioral:  Negative for agitation and confusion. The patient is not nervous/anxious.     Objective:     Vital Signs (Most  "Recent):  Temp: (!) 100.5 °F (38.1 °C) (12/13/22 0820)  Pulse: (!) 132 (12/13/22 0820)  Resp: 16 (12/13/22 0820)  BP: 103/61 (12/13/22 0820)  SpO2: 100 % (12/13/22 0820)   Vital Signs (24h Range):  Temp:  [96.6 °F (35.9 °C)-102.1 °F (38.9 °C)] 100.5 °F (38.1 °C)  Pulse:  [102-134] 132  Resp:  [12-18] 16  SpO2:  [100 %] 100 %  BP: (103-140)/(61-93) 103/61     Weight: 57.3 kg (126 lb 5.2 oz)  Height: 5' 4" (162.6 cm)  Body mass index is 21.68 kg/m².    Physical Exam  Vitals and nursing note reviewed.   Constitutional:       General: She is not in acute distress.  HENT:      Head: Normocephalic.      Right Ear: External ear normal.      Left Ear: External ear normal.      Nose: Nose normal.   Eyes:      Conjunctiva/sclera: Conjunctivae normal.   Cardiovascular:      Rate and Rhythm: Regular rhythm. Tachycardia present.      Heart sounds: No murmur heard.    No gallop.   Pulmonary:      Effort: Pulmonary effort is normal.      Breath sounds: No wheezing or rales.   Abdominal:      General: There is no distension.      Tenderness: There is no abdominal tenderness. There is no guarding or rebound.   Musculoskeletal:         General: Tenderness (Midline, R upper arm) present.      Cervical back: Normal range of motion.      Right lower leg: No edema.      Left lower leg: No edema.   Skin:     General: Skin is warm.   Neurological:      Mental Status: She is alert and oriented to person, place, and time.      Motor: Weakness present.   Psychiatric:         Mood and Affect: Mood normal.         Behavior: Behavior normal.         Thought Content: Thought content normal.         Judgment: Judgment normal.       Laboratory:  CBC:   Recent Labs   Lab 12/11/22  0708 12/12/22  0645 12/13/22  0550   WBC 11.22 11.02 14.45*   RBC 3.53* 3.40* 3.46*   HGB 10.6* 10.0* 10.4*   HCT 32.3* 32.1* 32.6*   * 694* 765*   MCV 92 94 94   MCH 30.0 29.4 30.1   MCHC 32.8 31.2* 31.9*     BMP:   Recent Labs   Lab 12/11/22  0708 12/12/22  0645 " 12/13/22  0550   GLU 89 112* 92    136 137   K 3.6 3.7 3.7    107 106   CO2 21* 19* 21*   BUN 22* 17 17   CREATININE 3.4* 2.8* 2.7*   CALCIUM 9.5 9.9 9.8     Labs within the past 24 hours have been reviewed.    Diagnostic Results:  US - Kidney: Results for orders placed during the hospital encounter of 12/01/22    US Transplant Kidney With Doppler    Narrative  EXAMINATION:  US TRANSPLANT KIDNEY WITH DOPPLER    CLINICAL HISTORY:  ARF and history of hydro;    TECHNIQUE:  Real time gray scale and doppler ultrasound was performed over the patient's renal allograft.    COMPARISON:  CT abdomen pelvis 12/04/2022.    Renal transplant ultrasound 12/04/2022.    FINDINGS:  Renal allograft in the right lower quadrant.  The allograft measures 12.4 cm. Normal perfusion. Mild hydronephrosis, similar to prior.  No ureteral stent.    No fluid collections.  Bladder is partially distended and appears within normal limits.    Vasculature:    Resistive indices ranged from 0.70 to 0.77 (previously 0.75-0.77).    Main renal artery peak systolic velocity: 124 (previously 229)cm/sec with normal waveform.    Renal artery/iliac ratio: 0.69.    The main renal vein is patent.    Impression  Satisfactory doppler evaluation of renal allograft.    Stable mild hydronephrosis.    Electronically signed by resident: Bruno Browning  Date:    12/11/2022  Time:    10:33    Electronically signed by: Sarmad Mejia  Date:    12/11/2022  Time:    11:43    US - Pancreas: Results for orders placed during the hospital encounter of 12/01/22    US Pancreas Transplant Post    Narrative  EXAMINATION:  US DOPPLER PANCREAS TRANSPLANT POST (XPD)    CLINICAL HISTORY:  s/p SPK; r/o pancreas infarct;    TECHNIQUE:  Grayscale, color flow, and spectral analysis was used to evaluate the pancreas allograft using transabdominal ultrasound technique.    COMPARISON:  Ultrasound from 11/25/2022    FINDINGS:  Pancreatic allograft demonstrates heterogeneous area  near the pancreatic head neck, not significantly changed compared to prior examination.  There are no peripancreatic fluid collections.    Velocities:    Conduit:216 cm/s    Splenic artery:96 cm/s    Splenic vein:14 cm/s    Portal vein:109 cm/s    Superior mesenteric vein:40 cm/s    Iliac artery:204 cm/s    SMA of Y graft: 38 cm/s    Resistive indices range from 0.62-0.78.    Impression  Satisfactory Doppler evaluation of the pancreatic allograft.    Redemonstration of heterogeneous, hypoechoic area of the pancreatic head/neck with adjacent bowel.  Findings are nonspecific and continued follow-up recommended.      Electronically signed by: Jim Cedeño MD  Date:    12/05/2022  Time:    12:30    Assessment/Plan:     * GEORGE (acute kidney injury)  - Cr remains elevated above BL  - DSA's undetected  - Kidney biopsy highly suspicious for AMBR  - Plan for IVIG and repeat bx 3-4 weels  - Had ureteral stent removed 11/30/22  - Dc in place for retention, will keep in ~2 weeks and f/u with urology outpatient       Fever of undetermined origin  - 12/12 pt had fever, tmax 102  - Blood/urine cxs and lactic acid ngtd   - Gave 1 dose vanc/cefe  - Likely due to CMV infection      CMV (cytomegalovirus) infection  - 12/8 CMV PCR + >30,000  - On IV GCV, can transition to PO per ID  - Cont weekly CMV PCR's, next due 12/15      Other specified anemias  - Hematology was consulted for a high ferritin level found during the work-up of anemia  - Hyperferritinemia most likely in the setting of sepsis and inflammation  - H score 68 points, with <1% probability of Hemophagocytic syndrome  - Ferritin trending down.      Steroid-induced hyperglycemia  - Endocrine consulted. Appreciate their assistance.      Urinary retention with incomplete bladder emptying  - Kidney US showed mild-mod hydro, dc placed  - Repeat US 12/3 with improved hydro and creatinine improved as well  - Dc upsized by urology due CT showing enlarged transplant  "kidney and distended bladder despite dc in adequate position.   - Had voiding trial last week but repeat US showed continued hydro, dc replaced  - Will keep in ~2 weeks and f/u with urology outpatient       Status post pancreas transplantation  - Amylase/lipase WNL   - Pancreas US  satisfactory  - Repeat Pancreas US ordered to r/o infarct as source of pain/fever, satisfactory   - Monitor with daily labs      Long-term use of immunosuppressant medication  - Continue prograf and steroids. Monitor prograf level daily, monitor for toxic side effects, and adjust for therapeutic dose   - Hold cellcept for GI upset.      Prophylactic immunotherapy  - See long term use of immunosuppressant medication      At risk for opportunistic infections  - Cont OI prophylaxis per protocol.       Status post -donor kidney transplantation  - s/p SPK 11/3/22 for ESRD 2/2 DMI  - See, "GEORGE"      Sinus tachycardia  - HR normally in 110's  - 's this am, 1L bolus ordered       Diarrhea  - Ct A/P obtained to evaluate patients abdominal pain. Showed possible infectious colitis, typhlitis or nonspecific colopathy  - C diff PCR negative  - CMV PCR > 30,000  - GI pathogen panel negative  - GI consulted, no plan to scope at this time  - Cont prn imodium      Anemia due to chronic kidney disease  - H/H stable on AM labs  - Recieved 1u prbc 12/3/22  - Parvo negative       Renovascular hypertension  - Hold antihypertensives for now as with hypotension from sepsis vs dehydration  - Assess daily and restart when appropriate        Discharge Planning: Not a candidate for d/c at this time.     Palmira Vivar PA-C  Kidney Transplant  [facility]  "

## 2022-12-13 NOTE — PROGRESS NOTES
"[facility] - [facility]plant [facility]  Adult Nutrition  Progress Note    SUMMARY       Recommendations    1. Continue Renal diet      2. Continue Boost breeze/Ensure clear (pt bringing from home) daily     3. RD to monitor and follow    Goals: Meet % EEN, EPN by RD f/u  Nutrition Goal Status: new  Communication of RD Recs:  (POC)    Assessment and Plan    Nutrition Problem  Increased energy needs    Related to (etiology):   Increased metabolic demands    Signs and Symptoms (as evidenced by):   Decreased renal function (Cr 2.7, eGFR 24)    Interventions/Recommendations (treatment strategy):  Collaboration with other providers    Nutrition Diagnosis Status:   New      Reason for Assessment    Reason For Assessment: length of stay  Diagnosis:  (GEORGE)  Relevant Medical History: Leukocytosis, kidney tx, pancreas tx  Interdisciplinary Rounds: did not attend    General Information Comments:   27 y.o.  female with ESRD secondary to diabetic nephropathy and HTN, now s/p SPK 11/3/22. Pt reports good appetite depends on what food she receive. Pt is drinking Ensure clear sometimes, stated that she does not tolerate milk-based ONS. RD also received verbal consult regarding pt's concern for consuming sprite. Sprite contain 0g phosphorus and minimal K, therefore is fine to consume by renal patient. UBW 155lb per chart. Noted wt loss of 16 lb (11%) x 1 week - unsure of accuracy. NFPE completed today, pt appears nourished. Does not meet criteria for malnutrition at this time.     Nutrition Discharge Planning: Pending on medical course    Nutrition Risk Screen    Nutrition Risk Screen: no indicators present    Anthropometrics    Temp: 98.8 °F (37.1 °C)  Height Method: Stated  Height: 5' 4" (162.6 cm)  Height (inches): 64 in  Weight Method: Bed Scale  Weight: 57.3 kg (126 lb 5.2 oz)  Weight (lb): 126.32 lb  Ideal Body Weight (IBW), Female: 120 lb  % Ideal Body Weight, Female (lb): 117.03 %  BMI (Calculated): 21.7   "   Lab/Procedures/Meds    Pertinent Labs Reviewed: reviewed  Pertinent Labs Comments: Cr 2.7, P 2.2, eGFR 24  Pertinent Medications Reviewed: reviewed  Pertinent Medications Comments: tacrolimus, psyllium husk, Na bicarb, prednisone, atorvastatin, heparin    Estimated/Assessed Needs    Weight Used For Calorie Calculations: 57.2 kg (126 lb)  Energy Calorie Requirements (kcal): 4580-6526 kcal  Energy Need Method: Kcal/kg (25-30)  Protein Requirements: 45-57g (0.8-1g/kg)  Weight Used For Protein Calculations: 57.2 kg (126 lb)  Fluid Requirements (mL): per MD  RDA Method (mL): 1428    Nutrition Prescription Ordered    Current Diet Order: Renal    Evaluation of Received Nutrient/Fluid Intake    Energy Calories Required: not meeting needs  Protein Required: not meeting needs  Comments: LBM 12/13  Tolerance: tolerating  % Intake of Estimated Energy Needs: 50 - 75 %  % Meal Intake: 50 - 75 %    Nutrition Risk    Level of Risk/Frequency of Follow-up:  (1x/week)     Monitor and Evaluation    Food and Nutrient Intake: energy intake, food and beverage intake  Food and Nutrient Adminstration: diet order  Physical Activity and Function: nutrition-related ADLs and IADLs  Anthropometric Measurements: height/length, weight, weight change, body mass index  Biochemical Data, Medical Tests and Procedures: electrolyte and renal panel  Nutrition-Focused Physical Findings: overall appearance     Nutrition Follow-Up    RD Follow-up?: Yes    Arabella Benoit,  Registration Eligible, Provisional LDN

## 2022-12-13 NOTE — ASSESSMENT & PLAN NOTE
- 12/8 CMV PCR + >30,000  - On IV GCV, can transition to PO per ID  - Cont weekly CMV PCR's, next due 12/15

## 2022-12-13 NOTE — ASSESSMENT & PLAN NOTE
- Kidney US showed mild-mod hydro, dc placed  - Repeat US 12/3 with improved hydro and creatinine improved as well  - Dc upsized by urology due CT showing enlarged transplant kidney and distended bladder despite dc in adequate position.   - Had voiding trial last week but repeat US showed continued hydro, dc replaced  - Will keep in ~2 weeks and f/u with urology outpatient

## 2022-12-14 PROBLEM — R11.14 BILIOUS VOMITING WITH NAUSEA: Status: RESOLVED | Noted: 2022-12-12 | Resolved: 2022-12-14

## 2022-12-14 PROBLEM — R11.2 NAUSEA AND VOMITING: Status: RESOLVED | Noted: 2022-12-12 | Resolved: 2022-12-14

## 2022-12-14 LAB
ALBUMIN SERPL BCP-MCNC: 2.1 G/DL (ref 3.5–5.2)
AMYLASE SERPL-CCNC: 39 U/L (ref 20–110)
ANION GAP SERPL CALC-SCNC: 10 MMOL/L (ref 8–16)
BASOPHILS # BLD AUTO: 0.07 K/UL (ref 0–0.2)
BASOPHILS NFR BLD: 0.5 % (ref 0–1.9)
BUN SERPL-MCNC: 15 MG/DL (ref 6–20)
CALCIUM SERPL-MCNC: 9.6 MG/DL (ref 8.7–10.5)
CHLORIDE SERPL-SCNC: 105 MMOL/L (ref 95–110)
CO2 SERPL-SCNC: 21 MMOL/L (ref 23–29)
CREAT SERPL-MCNC: 2.2 MG/DL (ref 0.5–1.4)
DIFFERENTIAL METHOD: ABNORMAL
EOSINOPHIL # BLD AUTO: 0.5 K/UL (ref 0–0.5)
EOSINOPHIL NFR BLD: 3.6 % (ref 0–8)
ERYTHROCYTE [DISTWIDTH] IN BLOOD BY AUTOMATED COUNT: 15.5 % (ref 11.5–14.5)
EST. GFR  (NO RACE VARIABLE): 30.7 ML/MIN/1.73 M^2
GLUCOSE SERPL-MCNC: 105 MG/DL (ref 70–110)
HCT VFR BLD AUTO: 31 % (ref 37–48.5)
HGB BLD-MCNC: 9.7 G/DL (ref 12–16)
IMM GRANULOCYTES # BLD AUTO: 0.2 K/UL (ref 0–0.04)
IMM GRANULOCYTES NFR BLD AUTO: 1.3 % (ref 0–0.5)
LIPASE SERPL-CCNC: 9 U/L (ref 4–60)
LYMPHOCYTES # BLD AUTO: 0.4 K/UL (ref 1–4.8)
LYMPHOCYTES NFR BLD: 2.7 % (ref 18–48)
MAGNESIUM SERPL-MCNC: 1.6 MG/DL (ref 1.6–2.6)
MCH RBC QN AUTO: 29.4 PG (ref 27–31)
MCHC RBC AUTO-ENTMCNC: 31.3 G/DL (ref 32–36)
MCV RBC AUTO: 94 FL (ref 82–98)
MONOCYTES # BLD AUTO: 1.3 K/UL (ref 0.3–1)
MONOCYTES NFR BLD: 8.6 % (ref 4–15)
NEUTROPHILS # BLD AUTO: 12.5 K/UL (ref 1.8–7.7)
NEUTROPHILS NFR BLD: 83.3 % (ref 38–73)
NRBC BLD-RTO: 0 /100 WBC
PHOSPHATE SERPL-MCNC: 2.1 MG/DL (ref 2.7–4.5)
PLATELET # BLD AUTO: 728 K/UL (ref 150–450)
PMV BLD AUTO: 10 FL (ref 9.2–12.9)
POCT GLUCOSE: 106 MG/DL (ref 70–110)
POTASSIUM SERPL-SCNC: 3.4 MMOL/L (ref 3.5–5.1)
RBC # BLD AUTO: 3.3 M/UL (ref 4–5.4)
SODIUM SERPL-SCNC: 136 MMOL/L (ref 136–145)
TACROLIMUS BLD-MCNC: 7.9 NG/ML (ref 5–15)
WBC # BLD AUTO: 15.04 K/UL (ref 3.9–12.7)

## 2022-12-14 PROCEDURE — 63600175 PHARM REV CODE 636 W HCPCS: Performed by: NURSE PRACTITIONER

## 2022-12-14 PROCEDURE — 85025 COMPLETE CBC W/AUTO DIFF WBC: CPT | Performed by: INTERNAL MEDICINE

## 2022-12-14 PROCEDURE — 25000003 PHARM REV CODE 250: Performed by: PHYSICIAN ASSISTANT

## 2022-12-14 PROCEDURE — 82150 ASSAY OF AMYLASE: CPT | Performed by: INTERNAL MEDICINE

## 2022-12-14 PROCEDURE — 80069 RENAL FUNCTION PANEL: CPT | Performed by: INTERNAL MEDICINE

## 2022-12-14 PROCEDURE — 25000003 PHARM REV CODE 250: Performed by: INTERNAL MEDICINE

## 2022-12-14 PROCEDURE — 63600175 PHARM REV CODE 636 W HCPCS: Performed by: PHYSICIAN ASSISTANT

## 2022-12-14 PROCEDURE — 20600001 HC STEP DOWN PRIVATE ROOM

## 2022-12-14 PROCEDURE — 80197 ASSAY OF TACROLIMUS: CPT | Performed by: INTERNAL MEDICINE

## 2022-12-14 PROCEDURE — 36415 COLL VENOUS BLD VENIPUNCTURE: CPT | Performed by: INTERNAL MEDICINE

## 2022-12-14 PROCEDURE — 99233 SBSQ HOSP IP/OBS HIGH 50: CPT | Mod: ,,, | Performed by: PHYSICIAN ASSISTANT

## 2022-12-14 PROCEDURE — 83690 ASSAY OF LIPASE: CPT | Performed by: INTERNAL MEDICINE

## 2022-12-14 PROCEDURE — 83735 ASSAY OF MAGNESIUM: CPT | Performed by: INTERNAL MEDICINE

## 2022-12-14 PROCEDURE — 99233 PR SUBSEQUENT HOSPITAL CARE,LEVL III: ICD-10-PCS | Mod: ,,, | Performed by: PHYSICIAN ASSISTANT

## 2022-12-14 PROCEDURE — 25000003 PHARM REV CODE 250: Performed by: NURSE PRACTITIONER

## 2022-12-14 PROCEDURE — 63600175 PHARM REV CODE 636 W HCPCS: Mod: JG | Performed by: PHYSICIAN ASSISTANT

## 2022-12-14 PROCEDURE — 63600175 PHARM REV CODE 636 W HCPCS: Mod: JG | Performed by: INTERNAL MEDICINE

## 2022-12-14 RX ORDER — DIPHENHYDRAMINE HCL 25 MG
50 CAPSULE ORAL ONCE
Status: COMPLETED | OUTPATIENT
Start: 2022-12-14 | End: 2022-12-14

## 2022-12-14 RX ORDER — SULFAMETHOXAZOLE AND TRIMETHOPRIM 400; 80 MG/1; MG/1
1 TABLET ORAL DAILY
Status: DISCONTINUED | OUTPATIENT
Start: 2022-12-15 | End: 2022-12-15 | Stop reason: HOSPADM

## 2022-12-14 RX ORDER — ACETAMINOPHEN 325 MG/1
650 TABLET ORAL ONCE
Status: COMPLETED | OUTPATIENT
Start: 2022-12-14 | End: 2022-12-14

## 2022-12-14 RX ADMIN — MICAFUNGIN SODIUM 100 MG: 100 INJECTION, POWDER, LYOPHILIZED, FOR SOLUTION INTRAVENOUS at 11:12

## 2022-12-14 RX ADMIN — SODIUM BICARBONATE 650 MG TABLET 1300 MG: at 08:12

## 2022-12-14 RX ADMIN — GANCICLOVIR SODIUM 156 MG: 50 INJECTION, POWDER, LYOPHILIZED, FOR SOLUTION INTRAVENTRICULAR at 10:12

## 2022-12-14 RX ADMIN — DIPHENHYDRAMINE HYDROCHLORIDE 50 MG: 25 CAPSULE ORAL at 12:12

## 2022-12-14 RX ADMIN — VENLAFAXINE HYDROCHLORIDE 37.5 MG: 37.5 CAPSULE, EXTENDED RELEASE ORAL at 08:12

## 2022-12-14 RX ADMIN — TACROLIMUS 4 MG: 1 CAPSULE ORAL at 05:12

## 2022-12-14 RX ADMIN — SULFAMETHOXAZOLE AND TRIMETHOPRIM 1 TABLET: 400; 80 TABLET ORAL at 08:12

## 2022-12-14 RX ADMIN — PREDNISONE 5 MG: 5 TABLET ORAL at 08:12

## 2022-12-14 RX ADMIN — HUMAN IMMUNOGLOBULIN G 55 G: 5 LIQUID INTRAVENOUS at 01:12

## 2022-12-14 RX ADMIN — TACROLIMUS 4 MG: 1 CAPSULE ORAL at 08:12

## 2022-12-14 RX ADMIN — LOPERAMIDE HYDROCHLORIDE 2 MG: 2 CAPSULE ORAL at 08:12

## 2022-12-14 RX ADMIN — ONDANSETRON 4 MG: 2 INJECTION INTRAMUSCULAR; INTRAVENOUS at 10:12

## 2022-12-14 RX ADMIN — LOPERAMIDE HYDROCHLORIDE 2 MG: 2 CAPSULE ORAL at 03:12

## 2022-12-14 RX ADMIN — ACETAMINOPHEN 650 MG: 325 TABLET ORAL at 12:12

## 2022-12-14 RX ADMIN — ATORVASTATIN CALCIUM 40 MG: 40 TABLET, FILM COATED ORAL at 12:12

## 2022-12-14 NOTE — SUBJECTIVE & OBJECTIVE
Subjective:   History of Present Illness:  Ms. Read is a 27 y.o.  female with ESRD secondary to diabetic nephropathy and HTN, now s/p SPK 11/3/22 (Thymo induction, CMV D-/R+). Her post-op course was complicated by urinary retention requiring Moncada replacement, now resolved. She was recently admitted to the hospital and discharged 11/29/22 after presenting with nausea/vomiting, GEORGE, and had fever. Infectious work up performed. Urine cx had +multiple organisms none in predominance. Kidney US with edema/possible pyelo, ID consulted. She was initially treated with broad spectrum antibiotics. She was discharged on PO cipro x 7 days at [facility]. She now presents to the ED with complaints of ongoing nausea/vomiting, decreased appetite, intermittent abdominal pain. She had ureteral stent removed outpatient yesterday 11/30/22. She was in infusion center today to give IVF but had episode of vomiting so she was sent to the ED. She denies dysuria/urgency. She denies history of gastroparesis (has had gastric emptying study in 2020 that was normal). In ED, she is noted to be tachycardic in the 130s, blood pressure 90s, along with low grade fever 100.4. EKG shows sinus tachycardia. Creatinine remains elevated above baseline. She is receiving 1L LR bolus in ED. Plan for repeat infectious work up, IVF hydration, IV antibiotics, kidney US, and CT A/P. Will also check CMV PCR.    Ms. Read is a 27 y.o. year old female who is status post Kidney, Pancreas Transplant - 11/3/2022  (#1).    Her maintenance immunosuppression consists of:   Immunosuppressants (From admission, onward)      Start     Stop Route Frequency Ordered    12/13/22 1800  tacrolimus capsule 4 mg         -- Oral 2 times daily 12/13/22 0900            Hospital Course:  Patient admitted from the ED with n/v, GEORGE, and abdominal pain (had ureteral stent removed outpatient the day before). Pancreas US satisfactory. Infectious work up revealed UTI for for E.  Faecium & Diphtheroids. Blood cxs ngtd. Continued to spike fever despite IV abx. ID consulted. UTI being treated with Zosyn/luis carlos. Dc in place for mild-mod hydro on US that appeared improved on repeat imaging but on CT the bladder was full despite proper dc placement. Urology upsized dc at bedside. Cr also remains elevated above baseline. DSA's undetected. Plan for kidney biopsy 12/8. Ct A/P obtained to evaluate patients abdominal pain. Showed possible infectious colitis, typhlitis or nonspecific colopathy. C diff PCR negative. CMV PCR negative. GI pathogens pending. Pt c/o worsening diarrhea so GI was consulted, no plan to scope at this time. Lastly, hematology was consulted for a high ferritin level found during the work-up of anemia. Hyperferritinemia most likely in the setting of sepsis and inflammation. H score 68 points, with <1% probability of Hemophagocytic syndrome. Ferritin trending down.    Interval history: No acute events overnight. Cr trending down, 2.2 from 2.7. Kidney bx highly suspicious for ABMR. Plan for treatment with IVIG and repeat bx in 3-4 weeks. Dc in place for urinary retention. Discussed with urology, will keep dc in ~2 weeks and f/u with urology outpatient. CMV PCR + >30,000. On IV GCV, ok to transition to PO per ID. Cont weekly CMV PCR's. Afebrile past 24 hours. Blood and urine cxs ngtd. Pt orthostatic, will continue to hydrate. Diarrhea improved, cont prn imodium. Encourage oral intake. Encourage ambulation. VSS. Will continue to montior.       Past Medical, Surgical, Family, and Social History:   Unchanged from H&P.    Scheduled Meds:   acetaminophen  650 mg Oral Once    atorvastatin  40 mg Oral Daily    diphenhydrAMINE  50 mg Oral Once    ganciclovir (CYTOVENE) IVPB  2.5 mg/kg Intravenous Q24H    heparin (porcine)  5,000 Units Subcutaneous Q8H    Immune Globulin G (IGG)-PRO-IGA 10 % injection (Privigen)  1 g/kg (Ideal) Intravenous Once    micafungin (MYCAMINE) IVPB  100  mg Intravenous Q24H    predniSONE  5 mg Oral Daily    psyllium husk (aspartame)  1 packet Oral Daily    sodium bicarbonate  1,300 mg Oral BID    [START ON 12/15/2022] sulfamethoxazole-trimethoprim 400-80mg  1 tablet Oral Daily    tacrolimus  4 mg Oral BID    venlafaxine  37.5 mg Oral Daily     Continuous Infusions:  PRN Meds:sodium chloride 0.9%, acetaminophen, dextrose 10%, dextrose 10%, glucagon (human recombinant), glucose, glucose, hydrALAZINE, loperamide, methylPREDNISolone sodium succinate injection, ondansetron, promethazine (PHENERGAN) IVPB, sodium chloride 0.9%    Intake/Output - Last 3 Shifts         12/12 0700 12/13 0659 12/13 0700 12/14 0659 12/14 0700  12/15 0659    P.O. 1480 845     IV Piggyback 400 1102.5     Total Intake(mL/kg) 1880 (32.8) 1947.5 (34.8)     Urine (mL/kg/hr) 2420 (1.8) 1850 (1.4) 700 (3.3)    Emesis/NG output  0     Other  0     Stool 0 0     Blood  0     Total Output 2420 1850 700    Net -540 +97.5 -700           Urine Occurrence  0 x     Stool Occurrence 4 x 3 x     Emesis Occurrence  0 x              Review of Systems   Constitutional:  Positive for activity change and appetite change (improving). Negative for chills and fever.   HENT: Negative.     Eyes: Negative.    Respiratory:  Negative for shortness of breath.    Cardiovascular:  Negative for chest pain and leg swelling.   Gastrointestinal:  Positive for diarrhea (improved). Negative for abdominal distention, constipation, nausea and vomiting.   Endocrine: Negative.    Genitourinary:  Negative for decreased urine volume, dysuria and hematuria.   Musculoskeletal: Negative.    Skin:  Positive for wound.   Allergic/Immunologic: Positive for immunocompromised state.   Neurological:  Positive for weakness. Negative for dizziness and light-headedness.   Hematological: Negative.    Psychiatric/Behavioral:  Negative for agitation and confusion. The patient is not nervous/anxious.     Objective:     Vital Signs (Most Recent):  Temp:  "98 °F (36.7 °C) (12/14/22 0720)  Pulse: 107 (12/14/22 0741)  Resp: 18 (12/14/22 0720)  BP: 126/67 (12/14/22 0720)  SpO2: 99 % (12/14/22 0720) Vital Signs (24h Range):  Temp:  [97.7 °F (36.5 °C)-98.8 °F (37.1 °C)] 98 °F (36.7 °C)  Pulse:  [101-127] 107  Resp:  [18] 18  SpO2:  [98 %-100 %] 99 %  BP: ()/(52-74) 126/67     Weight: 56 kg (123 lb 7.3 oz)  Height: 5' 4" (162.6 cm)  Body mass index is 21.19 kg/m².    Physical Exam  Vitals and nursing note reviewed.   Constitutional:       General: She is not in acute distress.  HENT:      Head: Normocephalic.      Right Ear: External ear normal.      Left Ear: External ear normal.      Nose: Nose normal.   Eyes:      Conjunctiva/sclera: Conjunctivae normal.   Cardiovascular:      Rate and Rhythm: Regular rhythm. Tachycardia present.      Heart sounds: No murmur heard.    No gallop.   Pulmonary:      Effort: Pulmonary effort is normal.      Breath sounds: No wheezing or rales.   Abdominal:      General: There is no distension.      Tenderness: There is no abdominal tenderness. There is no guarding or rebound.      Comments: Midline incision CDI, healing well   Musculoskeletal:         General: No tenderness.      Cervical back: Normal range of motion.      Right lower leg: No edema.      Left lower leg: No edema.   Skin:     General: Skin is warm.   Neurological:      Mental Status: She is alert and oriented to person, place, and time.      Motor: Weakness present.   Psychiatric:         Mood and Affect: Mood normal.         Behavior: Behavior normal.         Thought Content: Thought content normal.         Judgment: Judgment normal.       Laboratory:  CBC:   Recent Labs   Lab 12/12/22  0645 12/13/22  0550 12/14/22  0643   WBC 11.02 14.45* 15.04*   RBC 3.40* 3.46* 3.30*   HGB 10.0* 10.4* 9.7*   HCT 32.1* 32.6* 31.0*   * 765* 728*   MCV 94 94 94   MCH 29.4 30.1 29.4   MCHC 31.2* 31.9* 31.3*     BMP:   Recent Labs   Lab 12/12/22  0645 12/13/22  0550 12/14/22  0643 "   * 92 105    137 136   K 3.7 3.7 3.4*    106 105   CO2 19* 21* 21*   BUN 17 17 15   CREATININE 2.8* 2.7* 2.2*   CALCIUM 9.9 9.8 9.6     Labs within the past 24 hours have been reviewed.    Diagnostic Results:  US - Kidney: Results for orders placed during the hospital encounter of 12/01/22    US Transplant Kidney With Doppler    Narrative  EXAMINATION:  US TRANSPLANT KIDNEY WITH DOPPLER    CLINICAL HISTORY:  ARF and history of hydro;    TECHNIQUE:  Real time gray scale and doppler ultrasound was performed over the patient's renal allograft.    COMPARISON:  CT abdomen pelvis 12/04/2022.    Renal transplant ultrasound 12/04/2022.    FINDINGS:  Renal allograft in the right lower quadrant.  The allograft measures 12.4 cm. Normal perfusion. Mild hydronephrosis, similar to prior.  No ureteral stent.    No fluid collections.  Bladder is partially distended and appears within normal limits.    Vasculature:    Resistive indices ranged from 0.70 to 0.77 (previously 0.75-0.77).    Main renal artery peak systolic velocity: 124 (previously 229)cm/sec with normal waveform.    Renal artery/iliac ratio: 0.69.    The main renal vein is patent.    Impression  Satisfactory doppler evaluation of renal allograft.    Stable mild hydronephrosis.    Electronically signed by resident: Bruno Browning  Date:    12/11/2022  Time:    10:33    Electronically signed by: Sarmad Mejia  Date:    12/11/2022  Time:    11:43    US - Pancreas: Results for orders placed during the hospital encounter of 12/01/22    US Pancreas Transplant Post    Narrative  EXAMINATION:  US DOPPLER PANCREAS TRANSPLANT POST (XPD)    CLINICAL HISTORY:  s/p SPK; r/o pancreas infarct;    TECHNIQUE:  Grayscale, color flow, and spectral analysis was used to evaluate the pancreas allograft using transabdominal ultrasound technique.    COMPARISON:  Ultrasound from 11/25/2022    FINDINGS:  Pancreatic allograft demonstrates heterogeneous area near the  pancreatic head neck, not significantly changed compared to prior examination.  There are no peripancreatic fluid collections.    Velocities:    Conduit:216 cm/s    Splenic artery:96 cm/s    Splenic vein:14 cm/s    Portal vein:109 cm/s    Superior mesenteric vein:40 cm/s    Iliac artery:204 cm/s    SMA of Y graft: 38 cm/s    Resistive indices range from 0.62-0.78.    Impression  Satisfactory Doppler evaluation of the pancreatic allograft.    Redemonstration of heterogeneous, hypoechoic area of the pancreatic head/neck with adjacent bowel.  Findings are nonspecific and continued follow-up recommended.      Electronically signed by: Jim Cedeño MD  Date:    12/05/2022  Time:    12:30

## 2022-12-14 NOTE — PROGRESS NOTES
[facility] - [facility] Step[facility]  Kidney Transplant  Progress Note      Reason for Follow-up: Reassessment of Kidney, Pancreas Transplant - 11/3/2022  (#1) recipient and management of immunosuppression.    ORGAN:  RIGHT KIDNEY   Donor Type:  Donation after Brain Death       Subjective:   History of Present Illness:  Ms. Read is a 27 y.o.  female with ESRD secondary to diabetic nephropathy and HTN, now s/p SPK 11/3/22 (Thymo induction, CMV D-/R+). Her post-op course was complicated by urinary retention requiring Moncada replacement, now resolved. She was recently admitted to the hospital and discharged 11/29/22 after presenting with nausea/vomiting, GEORGE, and had fever. Infectious work up performed. Urine cx had +multiple organisms none in predominance. Kidney US with edema/possible pyelo, ID consulted. She was initially treated with broad spectrum antibiotics. She was discharged on PO cipro x 7 days at [facility]. She now presents to the ED with complaints of ongoing nausea/vomiting, decreased appetite, intermittent abdominal pain. She had ureteral stent removed outpatient yesterday 11/30/22. She was in infusion center today to give IVF but had episode of vomiting so she was sent to the ED. She denies dysuria/urgency. She denies history of gastroparesis (has had gastric emptying study in 2020 that was normal). In ED, she is noted to be tachycardic in the 130s, blood pressure 90s, along with low grade fever 100.4. EKG shows sinus tachycardia. Creatinine remains elevated above baseline. She is receiving 1L LR bolus in ED. Plan for repeat infectious work up, IVF hydration, IV antibiotics, kidney US, and CT A/P. Will also check CMV PCR.    Ms. Read is a 27 y.o. year old female who is status post Kidney, Pancreas Transplant - 11/3/2022  (#1).    Her maintenance immunosuppression consists of:   Immunosuppressants (From admission, onward)      Start     Stop Route Frequency Ordered    12/13/22 1800  tacrolimus  capsule 4 mg         -- Oral 2 times daily 12/13/22 0900            Hospital Course:  Patient admitted from the ED with n/v, GEORGE, and abdominal pain (had ureteral stent removed outpatient the day before). Pancreas US satisfactory. Infectious work up revealed UTI for for E. Faecium & Diphtheroids. Blood cxs ngtd. Continued to spike fever despite IV abx. ID consulted. UTI being treated with Zosyn/luis carlos. Dc in place for mild-mod hydro on US that appeared improved on repeat imaging but on CT the bladder was full despite proper dc placement. Urology upsized dc at bedside. Cr also remains elevated above baseline. DSA's undetected. Plan for kidney biopsy 12/8. Ct A/P obtained to evaluate patients abdominal pain. Showed possible infectious colitis, typhlitis or nonspecific colopathy. C diff PCR negative. CMV PCR negative. GI pathogens pending. Pt c/o worsening diarrhea so GI was consulted, no plan to scope at this time. Lastly, hematology was consulted for a high ferritin level found during the work-up of anemia. Hyperferritinemia most likely in the setting of sepsis and inflammation. H score 68 points, with <1% probability of Hemophagocytic syndrome. Ferritin trending down.    Interval history: No acute events overnight. Cr trending down, 2.2 from 2.7. Kidney bx highly suspicious for ABMR. Plan for treatment with IVIG and repeat bx in 3-4 weeks. Dc in place for urinary retention. Discussed with urology, will keep dc in ~2 weeks and f/u with urology outpatient. CMV PCR + >30,000. On IV GCV, ok to transition to PO per ID. Cont weekly CMV PCR's. Afebrile past 24 hours. Blood and urine cxs ngtd. Pt orthostatic, will continue to hydrate. Diarrhea improved, cont prn imodium. Encourage oral intake. Encourage ambulation. VSS. Will continue to montior.       Past Medical, Surgical, Family, and Social History:   Unchanged from H&P.    Scheduled Meds:   acetaminophen  650 mg Oral Once    atorvastatin  40 mg Oral Daily     diphenhydrAMINE  50 mg Oral Once    ganciclovir (CYTOVENE) IVPB  2.5 mg/kg Intravenous Q24H    heparin (porcine)  5,000 Units Subcutaneous Q8H    Immune Globulin G (IGG)-PRO-IGA 10 % injection (Privigen)  1 g/kg (Ideal) Intravenous Once    micafungin (MYCAMINE) IVPB  100 mg Intravenous Q24H    predniSONE  5 mg Oral Daily    psyllium husk (aspartame)  1 packet Oral Daily    sodium bicarbonate  1,300 mg Oral BID    [START ON 12/15/2022] sulfamethoxazole-trimethoprim 400-80mg  1 tablet Oral Daily    tacrolimus  4 mg Oral BID    venlafaxine  37.5 mg Oral Daily     Continuous Infusions:  PRN Meds:sodium chloride 0.9%, acetaminophen, dextrose 10%, dextrose 10%, glucagon (human recombinant), glucose, glucose, hydrALAZINE, loperamide, methylPREDNISolone sodium succinate injection, ondansetron, promethazine (PHENERGAN) IVPB, sodium chloride 0.9%    Intake/Output - Last 3 Shifts         12/12 0700  12/13 0659 12/13 0700 12/14 0659 12/14 0700  12/15 0659    P.O. 1480 845     IV Piggyback 400 1102.5     Total Intake(mL/kg) 1880 (32.8) 1947.5 (34.8)     Urine (mL/kg/hr) 2420 (1.8) 1850 (1.4) 700 (3.3)    Emesis/NG output  0     Other  0     Stool 0 0     Blood  0     Total Output 2420 1850 700    Net -540 +97.5 -700           Urine Occurrence  0 x     Stool Occurrence 4 x 3 x     Emesis Occurrence  0 x              Review of Systems   Constitutional:  Positive for activity change and appetite change (improving). Negative for chills and fever.   HENT: Negative.     Eyes: Negative.    Respiratory:  Negative for shortness of breath.    Cardiovascular:  Negative for chest pain and leg swelling.   Gastrointestinal:  Positive for diarrhea (improved). Negative for abdominal distention, constipation, nausea and vomiting.   Endocrine: Negative.    Genitourinary:  Negative for decreased urine volume, dysuria and hematuria.   Musculoskeletal: Negative.    Skin:  Positive for wound.   Allergic/Immunologic: Positive for  "immunocompromised state.   Neurological:  Positive for weakness. Negative for dizziness and light-headedness.   Hematological: Negative.    Psychiatric/Behavioral:  Negative for agitation and confusion. The patient is not nervous/anxious.     Objective:     Vital Signs (Most Recent):  Temp: 98 °F (36.7 °C) (12/14/22 0720)  Pulse: 107 (12/14/22 0741)  Resp: 18 (12/14/22 0720)  BP: 126/67 (12/14/22 0720)  SpO2: 99 % (12/14/22 0720) Vital Signs (24h Range):  Temp:  [97.7 °F (36.5 °C)-98.8 °F (37.1 °C)] 98 °F (36.7 °C)  Pulse:  [101-127] 107  Resp:  [18] 18  SpO2:  [98 %-100 %] 99 %  BP: ()/(52-74) 126/67     Weight: 56 kg (123 lb 7.3 oz)  Height: 5' 4" (162.6 cm)  Body mass index is 21.19 kg/m².    Physical Exam  Vitals and nursing note reviewed.   Constitutional:       General: She is not in acute distress.  HENT:      Head: Normocephalic.      Right Ear: External ear normal.      Left Ear: External ear normal.      Nose: Nose normal.   Eyes:      Conjunctiva/sclera: Conjunctivae normal.   Cardiovascular:      Rate and Rhythm: Regular rhythm. Tachycardia present.      Heart sounds: No murmur heard.    No gallop.   Pulmonary:      Effort: Pulmonary effort is normal.      Breath sounds: No wheezing or rales.   Abdominal:      General: There is no distension.      Tenderness: There is no abdominal tenderness. There is no guarding or rebound.      Comments: Midline incision CDI, healing well   Musculoskeletal:         General: No tenderness.      Cervical back: Normal range of motion.      Right lower leg: No edema.      Left lower leg: No edema.   Skin:     General: Skin is warm.   Neurological:      Mental Status: She is alert and oriented to person, place, and time.      Motor: Weakness present.   Psychiatric:         Mood and Affect: Mood normal.         Behavior: Behavior normal.         Thought Content: Thought content normal.         Judgment: Judgment normal.       Laboratory:  CBC:   Recent Labs   Lab " 12/12/22  0645 12/13/22  0550 12/14/22  0643   WBC 11.02 14.45* 15.04*   RBC 3.40* 3.46* 3.30*   HGB 10.0* 10.4* 9.7*   HCT 32.1* 32.6* 31.0*   * 765* 728*   MCV 94 94 94   MCH 29.4 30.1 29.4   MCHC 31.2* 31.9* 31.3*     BMP:   Recent Labs   Lab 12/12/22  0645 12/13/22  0550 12/14/22  0643   * 92 105    137 136   K 3.7 3.7 3.4*    106 105   CO2 19* 21* 21*   BUN 17 17 15   CREATININE 2.8* 2.7* 2.2*   CALCIUM 9.9 9.8 9.6     Labs within the past 24 hours have been reviewed.    Diagnostic Results:  US - Kidney: Results for orders placed during the hospital encounter of 12/01/22    US Transplant Kidney With Doppler    Narrative  EXAMINATION:  US TRANSPLANT KIDNEY WITH DOPPLER    CLINICAL HISTORY:  ARF and history of hydro;    TECHNIQUE:  Real time gray scale and doppler ultrasound was performed over the patient's renal allograft.    COMPARISON:  CT abdomen pelvis 12/04/2022.    Renal transplant ultrasound 12/04/2022.    FINDINGS:  Renal allograft in the right lower quadrant.  The allograft measures 12.4 cm. Normal perfusion. Mild hydronephrosis, similar to prior.  No ureteral stent.    No fluid collections.  Bladder is partially distended and appears within normal limits.    Vasculature:    Resistive indices ranged from 0.70 to 0.77 (previously 0.75-0.77).    Main renal artery peak systolic velocity: 124 (previously 229)cm/sec with normal waveform.    Renal artery/iliac ratio: 0.69.    The main renal vein is patent.    Impression  Satisfactory doppler evaluation of renal allograft.    Stable mild hydronephrosis.    Electronically signed by resident: Bruno Browning  Date:    12/11/2022  Time:    10:33    Electronically signed by: Sarmad Mejia  Date:    12/11/2022  Time:    11:43    US - Pancreas: Results for orders placed during the hospital encounter of 12/01/22    US Pancreas Transplant Post    Narrative  EXAMINATION:  US DOPPLER PANCREAS TRANSPLANT POST (XPD)    CLINICAL HISTORY:  s/p  SPK; r/o pancreas infarct;    TECHNIQUE:  Grayscale, color flow, and spectral analysis was used to evaluate the pancreas allograft using transabdominal ultrasound technique.    COMPARISON:  Ultrasound from 11/25/2022    FINDINGS:  Pancreatic allograft demonstrates heterogeneous area near the pancreatic head neck, not significantly changed compared to prior examination.  There are no peripancreatic fluid collections.    Velocities:    Conduit:216 cm/s    Splenic artery:96 cm/s    Splenic vein:14 cm/s    Portal vein:109 cm/s    Superior mesenteric vein:40 cm/s    Iliac artery:204 cm/s    SMA of Y graft: 38 cm/s    Resistive indices range from 0.62-0.78.    Impression  Satisfactory Doppler evaluation of the pancreatic allograft.    Redemonstration of heterogeneous, hypoechoic area of the pancreatic head/neck with adjacent bowel.  Findings are nonspecific and continued follow-up recommended.      Electronically signed by: Jim Cedeño MD  Date:    12/05/2022  Time:    12:30    Assessment/Plan:     * GEORGE (acute kidney injury)  - Cr remains elevated above BL but improving daily   - DSA's undetected  - Kidney biopsy highly suspicious for AMBR  - Giving IVIG today 12/14 and repeat bx 3-4 weels  - Had ureteral stent removed 11/30/22  - Moncada in place for retention, will keep in ~2 weeks and f/u with urology outpatient       Fever of undetermined origin  - 12/12 pt had fever, tmax 102  - Blood/urine cxs and lactic acid ngtd   - Gave 1 dose vanc/cefe  - Likely due to CMV infection      CMV (cytomegalovirus) infection  - 12/8 CMV PCR + >30,000  - On IV GCV, can transition to PO per ID  - Cont weekly CMV PCR's, next due 12/15      Other specified anemias  - Hematology was consulted for a high ferritin level found during the work-up of anemia  - Hyperferritinemia most likely in the setting of sepsis and inflammation  - H score 68 points, with <1% probability of Hemophagocytic syndrome  - Ferritin trending  "down.      Steroid-induced hyperglycemia  - Endocrine consulted. Appreciate their assistance.      Urinary retention with incomplete bladder emptying  - Kidney US showed mild-mod hydro, dc placed  - Repeat US 12/3 with improved hydro and creatinine improved as well  - Dc upsized by urology due CT showing enlarged transplant kidney and distended bladder despite dc in adequate position.   - Had voiding trial last week but repeat US showed continued hydro, dc replaced  - Will keep in ~2 weeks and f/u with urology outpatient       Status post pancreas transplantation  - Amylase/lipase WNL   - Pancreas US  satisfactory  - Repeat Pancreas US ordered to r/o infarct as source of pain/fever, satisfactory   - Monitor with daily labs      Long-term use of immunosuppressant medication  - Continue prograf and steroids. Monitor prograf level daily, monitor for toxic side effects, and adjust for therapeutic dose   - Hold cellcept for GI upset.      Prophylactic immunotherapy  - See long term use of immunosuppressant medication      At risk for opportunistic infections  - Cont OI prophylaxis per protocol.       Status post -donor kidney transplantation  - s/p SPK 11/3/22 for ESRD 2/2 DMI  - See, "GEORGE"      Sinus tachycardia  - HR normally in 110's  - 's yesterday, 1L bolus ordered   - Telemetry  - Monitor      Diarrhea  - Ct A/P obtained to evaluate patients abdominal pain. Showed possible infectious colitis, typhlitis or nonspecific colopathy  - C diff PCR negative  - CMV PCR > 30,000  - GI pathogen panel negative  - GI consulted, no plan to scope at this time  - Cont prn imodium      Anemia due to chronic kidney disease  - H/H stable on AM labs  - Recieved 1u prbc 12/3/22  - Parvo negative       Renovascular hypertension  - Hold antihypertensives for now as with hypotension from sepsis vs dehydration  - Assess daily and restart when appropriate      Discharge Planning: Not a candidate for d/c at " this time.     Palmira Vivar PA-C  Kidney Transplant  [facility] - [facility]

## 2022-12-14 NOTE — CARE UPDATE
RAPID RESPONSE NURSE ROUND       Rounding completed with charge RN, William for hypotension and tachycardia reports hypotension improved. No additional concerns verbalized at this time. Instructed to call [phone] for further concerns or assistance.

## 2022-12-14 NOTE — NURSING
Pt AAOx4, VSS, afebrile. Cr 2.2 from 2.7. IVIG #1 today running over 8H, pt tolerating well. Pt declining standing for frequent VS for IVIG infusion, TIMMY Vivar notified. GCV to be infused when IVIG is complete, ok per TIMMY Vivar. No family at bedside, bed in low/locked position, call light/personal belongings w/in reach, non-slip socks in place, pt remains free from falls, WCTM.

## 2022-12-14 NOTE — ASSESSMENT & PLAN NOTE
- Ct A/P obtained to evaluate patients abdominal pain. Showed possible infectious colitis, typhlitis or nonspecific colopathy  - C diff PCR negative  - CMV PCR > 30,000  - GI pathogen panel negative  - GI consulted, no plan to scope at this time  - Cont prn imodium   single current episode of major depressive disorder ([facility] [address]    Other and unspecified hyperlipidemia    Essential hypertension    Type 2 diabetes mellitus without complication ([facility] [address]    Peripheral neuropathy    Diabetic polyneuropathy (HCC)    Type 2 diabetes mellitus without complication, with long-term current use of insulin ([facility])    Numbness and tingling of both legs    Neuropathic pain of both legs    Acute gastritis    Obstructive sleep apnea    Chest pain    Abnormal electrocardiogram    Gastroesophageal reflux disease    Asthma exacerbation    Bronchitis    Acute respiratory failure with hypoxia ([facility])    Abnormal CXR    Pneumonia due to organism    CAP (community acquired pneumonia)    Bronchitis       Outpatient Prescriptions Marked as Taking for the 12/27/18 encounter (Office Visit) with Ed Woo MD   Medication Sig Dispense Refill    insulin lispro (HUMALOG) 100 UNIT/ML injection vial Inject 25 Units into the skin 3 times daily (before meals) Dx: E11.9 22 vial 1    insulin glargine (TOUJEO SOLOSTAR) 300 UNIT/ML injection pen Inject 75 Units into the skin nightly 8 pen 1    venlafaxine (EFFEXOR XR) 75 MG extended release capsule TAKE 1 CAPSULE BY MOUTH EVERY DAY 90 capsule 0    clonazePAM (KLONOPIN) 0.5 MG tablet Take 1 tablet by mouth 2 times daily as needed for Anxiety for up to 30 days. . 60 tablet 0    bisoprolol-hydrochlorothiazide (ZIAC) 10-6.25 MG per tablet Take 1 tablet by mouth daily 90 tablet 0    blood glucose test strips (ONETOUCH VERIO) strip USE TO TEST THREE TIMES DAILY 100 strip 2    Insulin Pen Needle (B-D UF III MINI PEN NEEDLES) 31G X 5 MM MISC USE FOUR TIMES DAILY 100 each 2    Insulin Syringe-Needle U-100 31G X 5/16\" 0.5 ML MISC 1 each by Does not apply route 3 times daily 100 each 3    fenofibrate (TRICOR) 48 MG tablet Take 1 tablet by mouth daily 90 tablet 0    losartan (COZAAR) 50 MG tablet TAKE 1 TABLET BY MOUTH EVERY DAY 90 tablet 1    venlafaxine (EFFEXOR XR) 150 MG extended release capsule TAKE ONE CAPSULE BY MOUTH EVERY DAY 90 capsule 0    atorvastatin (LIPITOR) 40 MG tablet Take 1 tablet by mouth daily 90 tablet 3    ONE TOUCH LANCETS MISC 1 each by Does not apply route 3 times daily Dx E11.9 100 each 3    albuterol sulfate HFA (PROAIR HFA) 108 (90 Base) MCG/ACT inhaler Inhale 2 puffs into the lungs 4 times daily 1 Inhaler 3    LIFESCAN FINEPOINT LANCETS MISC 1 Device by Does not apply route 2 times daily 100 each 5    ibuprofen (ADVIL;MOTRIN) 800 MG tablet Take 1 tablet by mouth 3 times daily as needed for Pain 30 tablet 0    budesonide-formoterol (SYMBICORT) 160-4.5 MCG/ACT AERO Inhale 2 puffs into the lungs 2 times daily 1 Inhaler 0    Blood Glucose Monitoring Suppl (ONE TOUCH ULTRA 2) W/DEVICE KIT 1 kit by Does not apply route daily 1 kit 0    aspirin EC 81 MG EC tablet Take 1 tablet by mouth daily. 30 tablet 3       Allergies   Allergen Reactions    Diclofenac Shortness Of Breath and Swelling    Abilify [Aripiprazole]      Irritability, mean, \"felt terrible\"    Bactrim [Sulfamethoxazole-Trimethoprim] Nausea Only    Influenza Vaccine Live      Wheezing    Lisinopril Swelling    Seroquel [Quetiapine Fumarate]      Abnormal heart rhythm      Albuterol Palpitations     CP    Gabapentin Rash     dizziness    Penicillins Rash       Social History   Substance Use Topics    Smoking status: Never Smoker    Smokeless tobacco: Current User     Types: Chew    Alcohol use No       Objective:   BP (!) 144/92 (Site: Right Upper Arm, Position: Sitting, Cuff Size: Large Adult)   Pulse 94   Wt 214 lb 9.6 oz (97.3 kg)   SpO2 98%   BMI 33.61 kg/m²     Physical Exam   Constitutional: He appears well-developed and well-nourished. No distress. Cardiovascular: Normal rate, regular rhythm and normal heart sounds. No murmur heard. Pulmonary/Chest: Effort normal and breath sounds normal. No respiratory distress. He has no wheezes.  He

## 2022-12-14 NOTE — ASSESSMENT & PLAN NOTE
- Cr remains elevated above BL but improving daily   - DSA's undetected  - Kidney biopsy highly suspicious for AMBR  - Giving IVIG today 12/14 and repeat bx 3-4 weels  - Had ureteral stent removed 11/30/22  - Moncada in place for retention, will keep in ~2 weeks and f/u with urology outpatient

## 2022-12-15 VITALS
HEART RATE: 125 BPM | BODY MASS INDEX: 21.27 KG/M2 | TEMPERATURE: 99 F | RESPIRATION RATE: 18 BRPM | WEIGHT: 124.56 LBS | DIASTOLIC BLOOD PRESSURE: 67 MMHG | SYSTOLIC BLOOD PRESSURE: 154 MMHG | HEIGHT: 64 IN | OXYGEN SATURATION: 100 %

## 2022-12-15 LAB
ALBUMIN SERPL BCP-MCNC: 2 G/DL (ref 3.5–5.2)
AMYLASE SERPL-CCNC: 59 U/L (ref 20–110)
ANION GAP SERPL CALC-SCNC: 6 MMOL/L (ref 8–16)
BASOPHILS # BLD AUTO: 0.04 K/UL (ref 0–0.2)
BASOPHILS NFR BLD: 0.4 % (ref 0–1.9)
BUN SERPL-MCNC: 16 MG/DL (ref 6–20)
CALCIUM SERPL-MCNC: 9.4 MG/DL (ref 8.7–10.5)
CHLORIDE SERPL-SCNC: 106 MMOL/L (ref 95–110)
CO2 SERPL-SCNC: 22 MMOL/L (ref 23–29)
CREAT SERPL-MCNC: 2.1 MG/DL (ref 0.5–1.4)
DIFFERENTIAL METHOD: ABNORMAL
EOSINOPHIL # BLD AUTO: 0.6 K/UL (ref 0–0.5)
EOSINOPHIL NFR BLD: 5.5 % (ref 0–8)
ERYTHROCYTE [DISTWIDTH] IN BLOOD BY AUTOMATED COUNT: 15.7 % (ref 11.5–14.5)
EST. GFR  (NO RACE VARIABLE): 32.5 ML/MIN/1.73 M^2
FINAL PATHOLOGIC DIAGNOSIS: NORMAL
GLUCOSE SERPL-MCNC: 90 MG/DL (ref 70–110)
GROSS: NORMAL
HCT VFR BLD AUTO: 30.5 % (ref 37–48.5)
HGB BLD-MCNC: 9.5 G/DL (ref 12–16)
IMM GRANULOCYTES # BLD AUTO: 0.17 K/UL (ref 0–0.04)
IMM GRANULOCYTES NFR BLD AUTO: 1.5 % (ref 0–0.5)
LIPASE SERPL-CCNC: 9 U/L (ref 4–60)
LYMPHOCYTES # BLD AUTO: 0.4 K/UL (ref 1–4.8)
LYMPHOCYTES NFR BLD: 3.8 % (ref 18–48)
Lab: NORMAL
MAGNESIUM SERPL-MCNC: 1.5 MG/DL (ref 1.6–2.6)
MCH RBC QN AUTO: 29.9 PG (ref 27–31)
MCHC RBC AUTO-ENTMCNC: 31.1 G/DL (ref 32–36)
MCV RBC AUTO: 96 FL (ref 82–98)
MONOCYTES # BLD AUTO: 0.9 K/UL (ref 0.3–1)
MONOCYTES NFR BLD: 8.4 % (ref 4–15)
NEUTROPHILS # BLD AUTO: 9 K/UL (ref 1.8–7.7)
NEUTROPHILS NFR BLD: 80.4 % (ref 38–73)
NRBC BLD-RTO: 0 /100 WBC
PHOSPHATE SERPL-MCNC: 1.9 MG/DL (ref 2.7–4.5)
PLATELET # BLD AUTO: 680 K/UL (ref 150–450)
PMV BLD AUTO: 9.6 FL (ref 9.2–12.9)
POTASSIUM SERPL-SCNC: 3.8 MMOL/L (ref 3.5–5.1)
RBC # BLD AUTO: 3.18 M/UL (ref 4–5.4)
SODIUM SERPL-SCNC: 134 MMOL/L (ref 136–145)
TACROLIMUS BLD-MCNC: 20.4 NG/ML (ref 5–15)
WBC # BLD AUTO: 11.12 K/UL (ref 3.9–12.7)

## 2022-12-15 PROCEDURE — 93010 EKG 12-LEAD: ICD-10-PCS | Mod: ,,, | Performed by: INTERNAL MEDICINE

## 2022-12-15 PROCEDURE — 63600175 PHARM REV CODE 636 W HCPCS: Performed by: PHYSICIAN ASSISTANT

## 2022-12-15 PROCEDURE — 83735 ASSAY OF MAGNESIUM: CPT | Performed by: INTERNAL MEDICINE

## 2022-12-15 PROCEDURE — 83690 ASSAY OF LIPASE: CPT | Performed by: INTERNAL MEDICINE

## 2022-12-15 PROCEDURE — 93010 ELECTROCARDIOGRAM REPORT: CPT | Mod: ,,, | Performed by: INTERNAL MEDICINE

## 2022-12-15 PROCEDURE — 25000242 PHARM REV CODE 250 ALT 637 W/ HCPCS: Performed by: STUDENT IN AN ORGANIZED HEALTH CARE EDUCATION/TRAINING PROGRAM

## 2022-12-15 PROCEDURE — 99239 PR HOSPITAL DISCHARGE DAY,>30 MIN: ICD-10-PCS | Mod: ,,, | Performed by: PHYSICIAN ASSISTANT

## 2022-12-15 PROCEDURE — 93005 ELECTROCARDIOGRAM TRACING: CPT

## 2022-12-15 PROCEDURE — 25000003 PHARM REV CODE 250: Performed by: PHYSICIAN ASSISTANT

## 2022-12-15 PROCEDURE — 80197 ASSAY OF TACROLIMUS: CPT | Performed by: INTERNAL MEDICINE

## 2022-12-15 PROCEDURE — 82150 ASSAY OF AMYLASE: CPT | Performed by: INTERNAL MEDICINE

## 2022-12-15 PROCEDURE — 80069 RENAL FUNCTION PANEL: CPT | Performed by: INTERNAL MEDICINE

## 2022-12-15 PROCEDURE — 99239 HOSP IP/OBS DSCHRG MGMT >30: CPT | Mod: ,,, | Performed by: PHYSICIAN ASSISTANT

## 2022-12-15 PROCEDURE — 85025 COMPLETE CBC W/AUTO DIFF WBC: CPT | Performed by: INTERNAL MEDICINE

## 2022-12-15 RX ORDER — SODIUM CHLORIDE 0.9 % (FLUSH) 0.9 %
10 SYRINGE (ML) INJECTION
Status: CANCELLED | OUTPATIENT
Start: 2022-12-16

## 2022-12-15 RX ORDER — DIPHENHYDRAMINE HCL 25 MG
25 CAPSULE ORAL
Status: CANCELLED | OUTPATIENT
Start: 2022-12-16

## 2022-12-15 RX ORDER — HEPARIN 100 UNIT/ML
500 SYRINGE INTRAVENOUS
Status: CANCELLED | OUTPATIENT
Start: 2022-12-16

## 2022-12-15 RX ORDER — EPINEPHRINE 0.3 MG/.3ML
0.3 INJECTION SUBCUTANEOUS ONCE AS NEEDED
Status: CANCELLED | OUTPATIENT
Start: 2022-12-16

## 2022-12-15 RX ORDER — DIPHENHYDRAMINE HYDROCHLORIDE 50 MG/ML
25 INJECTION INTRAMUSCULAR; INTRAVENOUS
Status: CANCELLED | OUTPATIENT
Start: 2022-12-16

## 2022-12-15 RX ORDER — ACETAMINOPHEN 500 MG
500 TABLET ORAL
Status: CANCELLED | OUTPATIENT
Start: 2022-12-16

## 2022-12-15 RX ORDER — PREDNISONE 5 MG/1
5 TABLET ORAL DAILY
Qty: 30 TABLET | Refills: 11 | Status: SHIPPED | OUTPATIENT
Start: 2022-12-16 | End: 2023-11-13 | Stop reason: SDUPTHER

## 2022-12-15 RX ORDER — DIPHENHYDRAMINE HYDROCHLORIDE 50 MG/ML
50 INJECTION INTRAMUSCULAR; INTRAVENOUS ONCE AS NEEDED
Status: CANCELLED | OUTPATIENT
Start: 2022-12-16

## 2022-12-15 RX ORDER — TACROLIMUS 1 MG/1
CAPSULE ORAL
Qty: 270 CAPSULE | Refills: 11 | Status: SHIPPED | OUTPATIENT
Start: 2022-12-15 | End: 2022-12-21 | Stop reason: DRUGHIGH

## 2022-12-15 RX ORDER — VALGANCICLOVIR 450 MG/1
450 TABLET, FILM COATED ORAL 2 TIMES DAILY
Qty: 60 TABLET | Refills: 1 | Status: SHIPPED | OUTPATIENT
Start: 2022-12-15 | End: 2022-12-23 | Stop reason: DRUGHIGH

## 2022-12-15 RX ADMIN — SULFAMETHOXAZOLE AND TRIMETHOPRIM 1 TABLET: 400; 80 TABLET ORAL at 09:12

## 2022-12-15 RX ADMIN — SODIUM BICARBONATE 650 MG TABLET 1300 MG: at 09:12

## 2022-12-15 RX ADMIN — LOPERAMIDE HYDROCHLORIDE 2 MG: 2 CAPSULE ORAL at 09:12

## 2022-12-15 RX ADMIN — VENLAFAXINE HYDROCHLORIDE 37.5 MG: 37.5 CAPSULE, EXTENDED RELEASE ORAL at 09:12

## 2022-12-15 RX ADMIN — PREDNISONE 5 MG: 5 TABLET ORAL at 09:12

## 2022-12-15 RX ADMIN — ATORVASTATIN CALCIUM 40 MG: 40 TABLET, FILM COATED ORAL at 09:12

## 2022-12-15 RX ADMIN — TACROLIMUS 4 MG: 1 CAPSULE ORAL at 09:12

## 2022-12-15 NOTE — PLAN OF CARE
SSC met with patient/family at bedside. Patient experience rounding completed and reviewed the following.     Do you know your discharge plan? Yes       If yes, what is the plan? Home    If you are discharging home, do you have help at home? Yes     Do you think you will need help at home at discharge? No     Have you discussed your needs and preferences with your SW/CM? Yes     Assigned SW/CM notified of any patient/family needs or concerns.

## 2022-12-15 NOTE — PROGRESS NOTES
Patient dc per md order. AVS given to the patient with updated blue card and medication. Follow up apts given. Patient notified to call coordinator with any issues. Patient educated on how to use leg bag and given supplies. Patient and patients mother gave full verbal understanding on all written and verbal dc instructions.

## 2022-12-15 NOTE — PLAN OF CARE
AAOx4, VSS, Tele- NSR, afebrile overnight-- IV gangcyclovir and micafungin continued  Moncada remains CDI, refer to flowsheet for total, Cr trending down-- IVIG x1 12/14, tolerated well.   Sleeping well between care, non skid socks worn, call light within reach, will cont to monitor

## 2022-12-15 NOTE — PROGRESS NOTES
Transplant Social Work Discharge Note:    Pt will discharge to patient's home under the care of Kiersten Radha, patient's mother, phone number [phone] .      me with patient in room. Patient reports readiness to discharge home at this time. Patient denied any mental health concerns at this time. Per medical team patient will discharge with a dc and will not require home health. Patient reports that she feels comfortable managing her dc.    Patient reports that she intends to  herself home today.  informed LARUA Martinez PA-C of this.    Patient agreed to contact transplant team with any needs, questions, or concerns as they arise.     Pt aware of, involved in, and coping well with this discharge plan. Pt did not have any concerns with the discharge plan at this time. SW remains available at [phone].    Makayla Bess LMSW

## 2022-12-15 NOTE — PROGRESS NOTES
Discharge Medication Note:    Hospital Course:      27 yoF KP on 11/3/22 DM1; CMV -/+; Thymo cPRA 0; HD prior to txp;  Readmitted for N/V and febrile. Broad spectrim abx started. Micafungin initiated for c/f abdominal source. Abx completed. CMV PCR [id number]. Treated with IV ganciclovir and switched to oral valcyte 450 mg BID at discharge. Repeat CMV pending. Aspirin held for kidney biopsy. C4D and DSA negative. 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 Treated with 1 dose IVIG inpt and second dose outpt. No other med changes. Continue to hold cellcept through the treatment of CMV. Metoprolol and nifedipine held inpt but restarted at discharge with hold parameters.     Met with Isabela Read at discharge to review discharge medications and to update the blue medication card.           Medication List        CHANGE how you take these medications      predniSONE 5 MG tablet  Commonly known as: DELTASONE  Take 1 tablet (5 mg total) by mouth once daily.  Start taking on: December 16, 2022  What changed:   how much to take  how to take this  when to take this  additional instructions     promethazine 12.5 MG Tab  Commonly known as: PHENERGAN  Take 1 tablet (12.5 mg total) by mouth every 6 (six) hours as needed (nausea).  What changed: Another medication with the same name was removed. Continue taking this medication, and follow the directions you see here.     tacrolimus 1 MG Cap  Commonly known as: PROGRAF  Take 2 capsules (2 mg total) by mouth every morning AND 2 capsules (2 mg total) every evening.  What changed: See the new instructions.     valGANciclovir 450 mg Tab  Commonly known as: VALCYTE  Take 1 tablet (450 mg total) by mouth 2 (two) times daily. Stop 2/2/2023  What changed: when to take this            CONTINUE taking these medications      aspirin 81 MG EC tablet  Commonly known as: ECOTRIN     k phos di & mono-sod phos mono 250 mg Tab  Commonly known as: K-Phos-NeutraL  Take 1 tablet by mouth 2 (two) times a  day.     magnesium oxide 400 mg (241.3 mg magnesium) tablet  Commonly known as: MAG-OX  Take 1 tablet (400 mg total) by mouth 2 (two) times daily.     metoprolol tartrate 25 MG tablet  Commonly known as: LOPRESSOR  Take 1 tablet (25 mg total) by mouth 2 (two) times daily. HOLD SBP <120 or HR <60     multivitamin per tablet  Commonly known as: THERAGRAN  Take 1 tablet by mouth once daily.     NIFEdipine 30 MG (OSM) 24 hr tablet  Commonly known as: PROCARDIA-XL  Take 1 tablet (30 mg total) by mouth once daily. HOLD FOR SBP <140     ondansetron 8 MG Tbdl  Commonly known as: ZOFRAN-ODT  DISSOLVE 1 tablet (8 mg total) by mouth every 8 (eight) hours as needed (nausea).     rosuvastatin 10 MG tablet  Commonly known as: CRESTOR  Take 1 tablet (10 mg total) by mouth every evening.     sodium bicarbonate 650 MG tablet  Take 2 tablets (1,300 mg total) by mouth 3 (three) times daily.     sulfamethoxazole-trimethoprim 400-80mg 400-80 mg per tablet  Commonly known as: BACTRIM,SEPTRA  Take 1 tablet by mouth once daily. Stop 5/2/2023     venlafaxine 37.5 MG 24 hr capsule  Commonly known as: EFFEXOR XR  Take 1 capsule (37.5 mg total) by mouth once daily.     VITAMIN D2 50,000 unit Cap  Generic drug: ergocalciferol  Take 1 capsule (50,000 Units total) by mouth every 7 days.            STOP taking these medications      ciprofloxacin HCl 500 MG tablet  Commonly known as: CIPRO     famotidine 20 MG tablet  Commonly known as: PEPCID     mycophenolate 250 mg Cap  Commonly known as: CELLCEPT               Where to Get Your Medications        These medications were sent to [facility] [address] [address]      Hours: Mon-Fri 7a-7p, Sat-Sun 10a-4p Phone: [phone]   predniSONE 5 MG tablet  tacrolimus 1 MG Cap  valGANciclovir 450 mg Tab            The following medications have been placed on HOLD and should be restarted in the outpatient setting (when appropriate): cellcept (hold tonights tacrolimus  dose as well)     Isabela Read verbalized understanding and had the opportunity to ask questions.

## 2022-12-16 ENCOUNTER — TELEPHONE (OUTPATIENT)
Dept: UROLOGY | Facility: CLINIC | Age: 27
End: 2022-12-16
Payer: MEDICARE

## 2022-12-16 LAB
B19V DNA # SPEC NAA+PROBE: NOT DETECTED COPIES/ML
CMV DNA SPEC QL NAA+PROBE: NOT DETECTED
CYTOMEGALOVIRUS LOG (IU/ML): NOT DETECTED LOGIU/ML
CYTOMEGALOVIRUS PCR, QUANT: NOT DETECTED IU/ML
SPECIMEN SOURCE: NORMAL

## 2022-12-16 NOTE — TELEPHONE ENCOUNTER
I spoke with patient, who agreed to come to [facility] on Jan 5th at 8 am not 10am as schedule in [facility], patient verbalized understanding of correct time of arrival to be 8 am. For voiding trial ok per Marisa Gonzalez LPN.

## 2022-12-17 LAB
BACTERIA BLD CULT: NORMAL
BACTERIA BLD CULT: NORMAL

## 2022-12-20 ENCOUNTER — PATIENT MESSAGE (OUTPATIENT)
Dept: TRANSPLANT | Facility: CLINIC | Age: 27
End: 2022-12-20
Payer: MEDICARE

## 2022-12-20 ENCOUNTER — TELEPHONE (OUTPATIENT)
Dept: INTERNAL MEDICINE | Facility: CLINIC | Age: 27
End: 2022-12-20
Payer: MEDICARE

## 2022-12-20 ENCOUNTER — OFFICE VISIT (OUTPATIENT)
Dept: INTERNAL MEDICINE | Facility: CLINIC | Age: 27
End: 2022-12-20
Attending: FAMILY MEDICINE
Payer: MEDICARE

## 2022-12-20 VITALS
OXYGEN SATURATION: 100 % | HEIGHT: 64 IN | SYSTOLIC BLOOD PRESSURE: 118 MMHG | DIASTOLIC BLOOD PRESSURE: 68 MMHG | WEIGHT: 125.75 LBS | BODY MASS INDEX: 21.47 KG/M2 | HEART RATE: 118 BPM

## 2022-12-20 DIAGNOSIS — Z94.83 STATUS POST PANCREAS TRANSPLANTATION: ICD-10-CM

## 2022-12-20 DIAGNOSIS — I10 HYPERTENSION, ESSENTIAL: ICD-10-CM

## 2022-12-20 DIAGNOSIS — I80.9 THROMBOPHLEBITIS: ICD-10-CM

## 2022-12-20 DIAGNOSIS — B25.9 CYTOMEGALOVIRUS INFECTION, UNSPECIFIED CYTOMEGALOVIRAL INFECTION TYPE: ICD-10-CM

## 2022-12-20 DIAGNOSIS — I77.0 AVF (ARTERIOVENOUS FISTULA): ICD-10-CM

## 2022-12-20 DIAGNOSIS — Z94.0 STATUS POST DECEASED-DONOR KIDNEY TRANSPLANTATION: ICD-10-CM

## 2022-12-20 DIAGNOSIS — E78.5 HYPERLIPIDEMIA, UNSPECIFIED HYPERLIPIDEMIA TYPE: ICD-10-CM

## 2022-12-20 DIAGNOSIS — Z79.60 LONG-TERM USE OF IMMUNOSUPPRESSANT MEDICATION: ICD-10-CM

## 2022-12-20 DIAGNOSIS — I82.621 ACUTE EMBOLISM AND THROMBOSIS OF DEEP VEIN OF RIGHT UPPER EXTREMITY: ICD-10-CM

## 2022-12-20 DIAGNOSIS — R91.8 ABNORMAL CT SCAN OF LUNG: ICD-10-CM

## 2022-12-20 DIAGNOSIS — Z86.39 HISTORY OF DIABETES MELLITUS, TYPE I: ICD-10-CM

## 2022-12-20 DIAGNOSIS — R33.9 URINARY RETENTION WITH INCOMPLETE BLADDER EMPTYING: Primary | ICD-10-CM

## 2022-12-20 PROBLEM — R50.9 FEVER OF UNDETERMINED ORIGIN: Status: RESOLVED | Noted: 2022-12-12 | Resolved: 2022-12-20

## 2022-12-20 PROBLEM — R19.7 DIARRHEA: Status: RESOLVED | Noted: 2021-03-05 | Resolved: 2022-12-20

## 2022-12-20 PROBLEM — N30.00 ACUTE CYSTITIS WITHOUT HEMATURIA: Status: RESOLVED | Noted: 2022-11-25 | Resolved: 2022-12-20

## 2022-12-20 PROBLEM — A41.9 SEPSIS: Status: RESOLVED | Noted: 2022-11-25 | Resolved: 2022-12-20

## 2022-12-20 PROBLEM — D64.89 OTHER SPECIFIED ANEMIAS: Status: RESOLVED | Noted: 2022-12-06 | Resolved: 2022-12-20

## 2022-12-20 PROCEDURE — 1111F PR DISCHARGE MEDS RECONCILED W/ CURRENT OUTPATIENT MED LIST: ICD-10-PCS | Mod: CPTII,S$GLB,, | Performed by: FAMILY MEDICINE

## 2022-12-20 PROCEDURE — 3074F PR MOST RECENT SYSTOLIC BLOOD PRESSURE < 130 MM HG: ICD-10-PCS | Mod: CPTII,S$GLB,, | Performed by: FAMILY MEDICINE

## 2022-12-20 PROCEDURE — 1160F RVW MEDS BY RX/DR IN RCRD: CPT | Mod: CPTII,S$GLB,, | Performed by: FAMILY MEDICINE

## 2022-12-20 PROCEDURE — 99499 UNLISTED E&M SERVICE: CPT | Mod: S$GLB,,, | Performed by: FAMILY MEDICINE

## 2022-12-20 PROCEDURE — 99214 PR OFFICE/OUTPT VISIT, EST, LEVL IV, 30-39 MIN: ICD-10-PCS | Mod: S$GLB,,, | Performed by: FAMILY MEDICINE

## 2022-12-20 PROCEDURE — 99999 PR PBB SHADOW E&M-EST. PATIENT-LVL IV: CPT | Mod: PBBFAC,,, | Performed by: FAMILY MEDICINE

## 2022-12-20 PROCEDURE — 3066F NEPHROPATHY DOC TX: CPT | Mod: CPTII,S$GLB,, | Performed by: FAMILY MEDICINE

## 2022-12-20 PROCEDURE — 3066F PR DOCUMENTATION OF TREATMENT FOR NEPHROPATHY: ICD-10-PCS | Mod: CPTII,S$GLB,, | Performed by: FAMILY MEDICINE

## 2022-12-20 PROCEDURE — 4010F ACE/ARB THERAPY RXD/TAKEN: CPT | Mod: CPTII,S$GLB,, | Performed by: FAMILY MEDICINE

## 2022-12-20 PROCEDURE — 4010F PR ACE/ARB THEARPY RXD/TAKEN: ICD-10-PCS | Mod: CPTII,S$GLB,, | Performed by: FAMILY MEDICINE

## 2022-12-20 PROCEDURE — 1160F PR REVIEW ALL MEDS BY PRESCRIBER/CLIN PHARMACIST DOCUMENTED: ICD-10-PCS | Mod: CPTII,S$GLB,, | Performed by: FAMILY MEDICINE

## 2022-12-20 PROCEDURE — 1111F DSCHRG MED/CURRENT MED MERGE: CPT | Mod: CPTII,S$GLB,, | Performed by: FAMILY MEDICINE

## 2022-12-20 PROCEDURE — 3078F DIAST BP <80 MM HG: CPT | Mod: CPTII,S$GLB,, | Performed by: FAMILY MEDICINE

## 2022-12-20 PROCEDURE — 3078F PR MOST RECENT DIASTOLIC BLOOD PRESSURE < 80 MM HG: ICD-10-PCS | Mod: CPTII,S$GLB,, | Performed by: FAMILY MEDICINE

## 2022-12-20 PROCEDURE — 3046F PR MOST RECENT HEMOGLOBIN A1C LEVEL > 9.0%: ICD-10-PCS | Mod: CPTII,S$GLB,, | Performed by: FAMILY MEDICINE

## 2022-12-20 PROCEDURE — 3008F BODY MASS INDEX DOCD: CPT | Mod: CPTII,S$GLB,, | Performed by: FAMILY MEDICINE

## 2022-12-20 PROCEDURE — 3074F SYST BP LT 130 MM HG: CPT | Mod: CPTII,S$GLB,, | Performed by: FAMILY MEDICINE

## 2022-12-20 PROCEDURE — 99999 PR PBB SHADOW E&M-EST. PATIENT-LVL IV: ICD-10-PCS | Mod: PBBFAC,,, | Performed by: FAMILY MEDICINE

## 2022-12-20 PROCEDURE — 99214 OFFICE O/P EST MOD 30 MIN: CPT | Mod: S$GLB,,, | Performed by: FAMILY MEDICINE

## 2022-12-20 PROCEDURE — 3046F HEMOGLOBIN A1C LEVEL >9.0%: CPT | Mod: CPTII,S$GLB,, | Performed by: FAMILY MEDICINE

## 2022-12-20 PROCEDURE — 1159F MED LIST DOCD IN RCRD: CPT | Mod: CPTII,S$GLB,, | Performed by: FAMILY MEDICINE

## 2022-12-20 PROCEDURE — 3008F PR BODY MASS INDEX (BMI) DOCUMENTED: ICD-10-PCS | Mod: CPTII,S$GLB,, | Performed by: FAMILY MEDICINE

## 2022-12-20 PROCEDURE — 99499 RISK ADDL DX/OHS AUDIT: ICD-10-PCS | Mod: S$GLB,,, | Performed by: FAMILY MEDICINE

## 2022-12-20 PROCEDURE — 1159F PR MEDICATION LIST DOCUMENTED IN MEDICAL RECORD: ICD-10-PCS | Mod: CPTII,S$GLB,, | Performed by: FAMILY MEDICINE

## 2022-12-20 NOTE — TELEPHONE ENCOUNTER
Called pt to see if she was still coming in, or maybe running a few minutes late. Pt stated that she is parking and fixing to come inside.

## 2022-12-20 NOTE — PROGRESS NOTES
Subjective:       Patient ID: Isabela Read is a 27 y.o. female.    Chief Complaint: Follow-up    Patient presents for a post hospitalization follow up visit. Current complaints addressed in the ROS section. Reviewed the pertinent chart data including (but not limited to) labs, imaging, cardiovascular, procedures and available ER/DC Summary and inpatient provider notes. Problem list items reviewed and modified or added entries (in the overview section) may not be transcribed into this encounter note due to note writer format.    Recent transplants. In all feeling much better. Prominent vessel R elbow - IV site. No arm edema, no proximal pain.  Extends at about the antecubital fossa.  No warmth or redness reported.    Urinary tension reported post transplant.  Had a catheter, has follow-up with Urology scheduled.        Copy D/C Summary:  Admission Date: 12/1/2022  Hospital Length of Stay: 7 days  Discharge Date and Time:  12/15/2022 1:21 PM  Attending Physician: Melani Mcintyre MD   Discharging Provider: Palmira Vivar PA-C  Primary Care Provider: Tavo Bhatia MD     HPI:   Ms. Read is a 27 y.o.  female with ESRD secondary to diabetic nephropathy and HTN, now s/p SPK 11/3/22 (Thymo induction, CMV D-/R+). Her post-op course was complicated by urinary retention requiring Moncada replacement, now resolved. She was recently admitted to the hospital and discharged 11/29/22 after presenting with nausea/vomiting, GEORGE, and had fever. Infectious work up performed. Urine cx had +multiple organisms none in predominance. Kidney US with edema/possible pyelo, ID consulted. She was initially treated with broad spectrum antibiotics. She was discharged on PO cipro x 7 days at [facility]. She now presents to the ED with complaints of ongoing nausea/vomiting, decreased appetite, intermittent abdominal pain. She had ureteral stent removed outpatient yesterday 11/30/22. She was in infusion center today to give  IVF but had episode of vomiting so she was sent to the ED. She denies dysuria/urgency. She denies history of gastroparesis (has had gastric emptying study in 2020 that was normal). In ED, she is noted to be tachycardic in the 130s, blood pressure 90s, along with low grade fever 100.4. EKG shows sinus tachycardia. Creatinine remains elevated above baseline. She is receiving 1L LR bolus in ED. Plan for repeat infectious work up, IVF hydration, IV antibiotics, kidney US, and CT A/P. Will also check CMV PCR.        Hospital Course:    Patient admitted from the ED with n/v, GEORGE, and abdominal pain (had ureteral stent removed outpatient the day before). Pancreas US satisfactory. Infectious work up revealed UTI for for E. Faecium & Diphtheroids. Blood cxs ngtd. Continued to spike fever despite IV abx. ID consulted. Transitioned to Zosyn/luis carlos (completed treatment 12/10). Remains afebrile. Dc in place for mild-mod hydro on US that appeared improved on repeat imaging but on CT the bladder was full despite proper dc placement. Urology upsized dc at bedside. Voiding trial 12/9 passed but Cr began to trend up again and mild hydro on US unchanged. Per surgeon dc was replaced and plan to keep dc for ~2 weeks until f/u with urology outpatient. Cr remained elevated above baseline but improving. DSA's undetected. IR consulted and Kidney biopsy done 12/8, showed IF ~ 5%,  interstitial edema due to ATI, significant peritubular capillaritis which high suspicious for ABMR. C4d negative. Gave IVIG 12/14 and plan for 2nd dose outpatient. Will repeat biopsy in 3-4 weeks.      Ct A/P obtained to evaluate patients abdominal pain/diarrhea. Showed possible infectious colitis, typhlitis or nonspecific colopathy. C diff PCR negative. GI pathogens negative. GI was consulted, did not recommend scoping while inpatient. Pt was given PRN imodium for diarrhea and reported significant improvement. Will f/u outpatient with GI for further  eval/possible flex sig.      CMV PCR from 12/8, >30,000. Pt was started on IV GCV while inpatient and transitioned to valcyte on discharge. CMV PCR 12/15 pending, will repeat lab weekly.     Lastly, hematology was consulted for a high ferritin level found during anemia work-up. Hyperferritinemia most likely in the setting of sepsis and inflammation. H score 68 points, with <1% probability of Hemophagocytic syndrome. Ferritin trending down.     Pt is stable and ready for discharge. She has no complaints. She has met with PharmD and received education. She will follow up with labs and clinic appointment. Pt expressed understanding of discharge instructions and importance of follow up.       Review of Systems   Constitutional:  Positive for fatigue. Negative for appetite change, chills, diaphoresis and fever.   HENT:  Negative for congestion, postnasal drip, rhinorrhea, sore throat and trouble swallowing.    Eyes:  Negative for visual disturbance.   Respiratory:  Negative for cough, choking, chest tightness, shortness of breath and wheezing.    Cardiovascular:  Negative for chest pain and leg swelling.   Gastrointestinal:  Negative for abdominal distention, abdominal pain, diarrhea, nausea and vomiting.   Genitourinary:  Negative for difficulty urinating and hematuria.   Musculoskeletal:  Positive for arthralgias and myalgias.   Skin:  Negative for rash.   Neurological:  Positive for weakness. Negative for light-headedness and headaches.   Hematological:  Does not bruise/bleed easily.   Psychiatric/Behavioral:  Negative for decreased concentration and dysphoric mood.      Objective:      Physical Exam  Vitals and nursing note reviewed.   Constitutional:       Appearance: She is well-developed. She is not diaphoretic.   HENT:      Head: Normocephalic and atraumatic.   Eyes:      General: No scleral icterus.     Conjunctiva/sclera: Conjunctivae normal.   Cardiovascular:      Rate and Rhythm: Normal rate.      Heart  sounds: Normal heart sounds. No murmur heard.    No friction rub. No gallop.   Pulmonary:      Effort: Pulmonary effort is normal. No respiratory distress.      Breath sounds: Normal breath sounds. No wheezing or rales.   Abdominal:      General: There is no distension or abdominal bruit.      Tenderness: There is no abdominal tenderness.   Musculoskeletal:         General: No deformity.        Arms:       Cervical back: Normal range of motion and neck supple.   Skin:     General: Skin is warm and dry.      Findings: No erythema or rash.   Neurological:      Mental Status: She is alert and oriented to person, place, and time.      Cranial Nerves: No cranial nerve deficit.      Motor: No tremor.      Coordination: Coordination normal.      Gait: Gait normal.   Psychiatric:         Behavior: Behavior normal.         Thought Content: Thought content normal.         Judgment: Judgment normal.       Assessment:       1. Urinary retention with incomplete bladder emptying    2. Status post -donor kidney transplantation    3. Status post pancreas transplantation    4. Long-term use of immunosuppressant medication    5. Cytomegalovirus infection, unspecified cytomegaloviral infection type    6. Abnormal CT scan of lung    7. Hypertension, essential    8. Hyperlipidemia, unspecified hyperlipidemia type    9. AVF (arteriovenous fistula)    10. Thrombophlebitis    11. Acute embolism and thrombosis of deep vein of right upper extremity    12. History of diabetes mellitus, type I          Plan:     Medication List with Changes/Refills   Current Medications    ASPIRIN (ECOTRIN) 81 MG EC TABLET    Take 81 mg by mouth once daily.    ERGOCALCIFEROL (ERGOCALCIFEROL) 50,000 UNIT CAP    Take 1 capsule (50,000 Units total) by mouth every 7 days.    K PHOS DI & MONO-SOD PHOS MONO (K-PHOS-NEUTRAL) 250 MG TAB    Take 1 tablet by mouth 2 (two) times a day.    MAGNESIUM OXIDE (MAG-OX) 400 MG (241.3 MG MAGNESIUM) TABLET    Take 1  tablet (400 mg total) by mouth 2 (two) times daily.    METOPROLOL TARTRATE (LOPRESSOR) 25 MG TABLET    Take 1 tablet (25 mg total) by mouth 2 (two) times daily. HOLD SBP <120 or HR <60    MULTIVITAMIN (THERAGRAN) PER TABLET    Take 1 tablet by mouth once daily.    NIFEDIPINE (PROCARDIA-XL) 30 MG (OSM) 24 HR TABLET    Take 1 tablet (30 mg total) by mouth once daily. HOLD FOR SBP <140    ONDANSETRON (ZOFRAN-ODT) 8 MG TBDL    DISSOLVE 1 tablet (8 mg total) by mouth every 8 (eight) hours as needed (nausea).    PREDNISONE (DELTASONE) 5 MG TABLET    Take 1 tablet (5 mg total) by mouth once daily.    PROMETHAZINE (PHENERGAN) 12.5 MG TAB    Take 1 tablet (12.5 mg total) by mouth every 6 (six) hours as needed (nausea).    ROSUVASTATIN (CRESTOR) 10 MG TABLET    Take 1 tablet (10 mg total) by mouth every evening.    SODIUM BICARBONATE 650 MG TABLET    Take 2 tablets (1,300 mg total) by mouth 3 (three) times daily.    SULFAMETHOXAZOLE-TRIMETHOPRIM 400-80MG (BACTRIM,SEPTRA) 400-80 MG PER TABLET    Take 1 tablet by mouth once daily. Stop 2023    TACROLIMUS (PROGRAF) 1 MG CAP    Take 2 capsules (2 mg total) by mouth every morning AND 2 capsules (2 mg total) every evening.    VALGANCICLOVIR (VALCYTE) 450 MG TAB    Take 1 tablet (450 mg total) by mouth 2 (two) times daily. Stop 2023    VENLAFAXINE (EFFEXOR XR) 37.5 MG 24 HR CAPSULE    Take 1 capsule (37.5 mg total) by mouth once daily.     1. Urinary retention with incomplete bladder emptying    2. Status post -donor kidney transplantation  Overview:  2022      3. Status post pancreas transplantation  Overview:  2022      4. Long-term use of immunosuppressant medication    5. Cytomegalovirus infection, unspecified cytomegaloviral infection type  Overview:  -post transplant - manage by ID and nephrology (transplant)      6. Abnormal CT scan of lung  Overview:  2022      7. Hypertension, essential  Overview:  Labile      8. Hyperlipidemia, unspecified  hyperlipidemia type    9. AVF (arteriovenous fistula)  Overview:  -ISRAELE hemo dialysis graft in place      10. Thrombophlebitis  Comments:  R antecubital  Orders:  -     CV Ultrasound doppler venous arm right; Future; Expected date: 12/21/2022    11. Acute embolism and thrombosis of deep vein of right upper extremity  -     CV Ultrasound doppler venous arm right; Future; Expected date: 12/21/2022    12. History of diabetes mellitus, type I  Overview:  Hyperglycemia and medication requirements resolved after pancreas transplantation 11/2/2022        See meds, orders, follow up, routing and instructions sections of encounter and AVS. Discussed with patient and provided on AVS.    Lab Results   Component Value Date     (L) 12/15/2022    K 3.8 12/15/2022     12/15/2022    BUN 16 12/15/2022    CREATININE 2.1 (H) 12/15/2022    GLU 90 12/15/2022    HGBA1C 11.1 (H) 11/02/2022    MG 1.5 (L) 12/15/2022    AST 15 12/04/2022    ALT 15 12/04/2022    ALBUMIN 2.0 (L) 12/15/2022    PROT 5.1 (L) 12/04/2022    BILITOT 0.4 12/04/2022    CHOL 152 11/02/2022    HDL 64 11/02/2022    LDLCALC 73.4 11/02/2022    TRIG 142 12/07/2022    WBC 11.12 12/15/2022    HGB 9.5 (L) 12/15/2022    HCT 30.5 (L) 12/15/2022    HCT 27 (L) 11/03/2022     (H) 12/15/2022    TSH 2.332 09/15/2022    BNP 1,768 (H) 09/14/2022    URICACID 4.1 11/02/2022

## 2022-12-21 ENCOUNTER — LAB VISIT (OUTPATIENT)
Dept: LAB | Facility: HOSPITAL | Age: 27
End: 2022-12-21
Attending: INTERNAL MEDICINE
Payer: MEDICARE

## 2022-12-21 ENCOUNTER — PATIENT MESSAGE (OUTPATIENT)
Dept: TRANSPLANT | Facility: CLINIC | Age: 27
End: 2022-12-21
Payer: MEDICARE

## 2022-12-21 DIAGNOSIS — Z94.83 STATUS POST PANCREAS TRANSPLANTATION: ICD-10-CM

## 2022-12-21 DIAGNOSIS — Z94.0 KIDNEY REPLACED BY TRANSPLANT: Primary | ICD-10-CM

## 2022-12-21 DIAGNOSIS — Z94.83 STATUS POST SIMULTANEOUS KIDNEY AND PANCREAS TRANSPLANT: ICD-10-CM

## 2022-12-21 DIAGNOSIS — Z94.0 STATUS POST SIMULTANEOUS KIDNEY AND PANCREAS TRANSPLANT: ICD-10-CM

## 2022-12-21 DIAGNOSIS — Z94.0 KIDNEY REPLACED BY TRANSPLANT: ICD-10-CM

## 2022-12-21 LAB
ALBUMIN SERPL BCP-MCNC: 2.6 G/DL (ref 3.5–5.2)
AMYLASE SERPL-CCNC: 108 U/L (ref 20–110)
ANION GAP SERPL CALC-SCNC: 7 MMOL/L (ref 8–16)
BASOPHILS # BLD AUTO: 0.12 K/UL (ref 0–0.2)
BASOPHILS NFR BLD: 1.2 % (ref 0–1.9)
BUN SERPL-MCNC: 20 MG/DL (ref 6–20)
CALCIUM SERPL-MCNC: 9.4 MG/DL (ref 8.7–10.5)
CHLORIDE SERPL-SCNC: 108 MMOL/L (ref 95–110)
CO2 SERPL-SCNC: 23 MMOL/L (ref 23–29)
CREAT SERPL-MCNC: 1.3 MG/DL (ref 0.5–1.4)
DIFFERENTIAL METHOD: ABNORMAL
EOSINOPHIL # BLD AUTO: 0.9 K/UL (ref 0–0.5)
EOSINOPHIL NFR BLD: 9.2 % (ref 0–8)
ERYTHROCYTE [DISTWIDTH] IN BLOOD BY AUTOMATED COUNT: 16.2 % (ref 11.5–14.5)
EST. GFR  (NO RACE VARIABLE): 57.8 ML/MIN/1.73 M^2
GLUCOSE SERPL-MCNC: 82 MG/DL (ref 70–110)
HCT VFR BLD AUTO: 31.3 % (ref 37–48.5)
HGB BLD-MCNC: 9.9 G/DL (ref 12–16)
IMM GRANULOCYTES # BLD AUTO: 0.08 K/UL (ref 0–0.04)
IMM GRANULOCYTES NFR BLD AUTO: 0.8 % (ref 0–0.5)
LIPASE SERPL-CCNC: 25 U/L (ref 4–60)
LYMPHOCYTES # BLD AUTO: 0.6 K/UL (ref 1–4.8)
LYMPHOCYTES NFR BLD: 5.9 % (ref 18–48)
MAGNESIUM SERPL-MCNC: 1.7 MG/DL (ref 1.6–2.6)
MCH RBC QN AUTO: 29.6 PG (ref 27–31)
MCHC RBC AUTO-ENTMCNC: 31.6 G/DL (ref 32–36)
MCV RBC AUTO: 94 FL (ref 82–98)
MONOCYTES # BLD AUTO: 0.6 K/UL (ref 0.3–1)
MONOCYTES NFR BLD: 5.7 % (ref 4–15)
NEUTROPHILS # BLD AUTO: 7.6 K/UL (ref 1.8–7.7)
NEUTROPHILS NFR BLD: 77.2 % (ref 38–73)
NRBC BLD-RTO: 0 /100 WBC
PHOSPHATE SERPL-MCNC: 2.1 MG/DL (ref 2.7–4.5)
PLATELET # BLD AUTO: 564 K/UL (ref 150–450)
PMV BLD AUTO: 10 FL (ref 9.2–12.9)
POTASSIUM SERPL-SCNC: 4 MMOL/L (ref 3.5–5.1)
RBC # BLD AUTO: 3.34 M/UL (ref 4–5.4)
SODIUM SERPL-SCNC: 138 MMOL/L (ref 136–145)
TACROLIMUS BLD-MCNC: 5.1 NG/ML (ref 5–15)
WBC # BLD AUTO: 9.89 K/UL (ref 3.9–12.7)

## 2022-12-21 PROCEDURE — 80069 RENAL FUNCTION PANEL: CPT | Performed by: INTERNAL MEDICINE

## 2022-12-21 PROCEDURE — 83735 ASSAY OF MAGNESIUM: CPT | Performed by: INTERNAL MEDICINE

## 2022-12-21 PROCEDURE — 85025 COMPLETE CBC W/AUTO DIFF WBC: CPT | Performed by: INTERNAL MEDICINE

## 2022-12-21 PROCEDURE — 82150 ASSAY OF AMYLASE: CPT | Performed by: INTERNAL MEDICINE

## 2022-12-21 PROCEDURE — 83690 ASSAY OF LIPASE: CPT | Performed by: INTERNAL MEDICINE

## 2022-12-21 PROCEDURE — 80197 ASSAY OF TACROLIMUS: CPT | Performed by: INTERNAL MEDICINE

## 2022-12-21 PROCEDURE — 36415 COLL VENOUS BLD VENIPUNCTURE: CPT | Performed by: INTERNAL MEDICINE

## 2022-12-21 NOTE — TELEPHONE ENCOUNTER
Voicemail left for patient as well as message sent to patient via [facility] with medication adjustment. Next labs are scheduled for Tuesday 12/27.    ----- Message from Tammie Broussard DO sent at 12/21/2022  1:27 PM CST -----  Low FK. Increase FK to 3/2  MMF on hold due to CMV infection. F/u on CMV PCR. Have one prior negative CMV PCR on 12/15/22  Baseline kidney function. Lipase trending upwards  Repeat labs next week

## 2022-12-22 DIAGNOSIS — Z94.0 KIDNEY REPLACED BY TRANSPLANT: Primary | ICD-10-CM

## 2022-12-22 LAB
CMV DNA SPEC QL NAA+PROBE: NOT DETECTED
CYTOMEGALOVIRUS LOG (IU/ML): NOT DETECTED LOGIU/ML
CYTOMEGALOVIRUS PCR, QUANT: NOT DETECTED IU/ML

## 2022-12-22 RX ORDER — TACROLIMUS 1 MG/1
CAPSULE ORAL
Qty: 270 CAPSULE | Refills: 11 | Status: SHIPPED | OUTPATIENT
Start: 2022-12-22 | End: 2022-12-27 | Stop reason: DRUGHIGH

## 2022-12-23 ENCOUNTER — PATIENT MESSAGE (OUTPATIENT)
Dept: TRANSPLANT | Facility: CLINIC | Age: 27
End: 2022-12-23
Payer: MEDICARE

## 2022-12-23 DIAGNOSIS — Z94.83 STATUS POST SIMULTANEOUS KIDNEY AND PANCREAS TRANSPLANT: ICD-10-CM

## 2022-12-23 DIAGNOSIS — Z94.0 STATUS POST SIMULTANEOUS KIDNEY AND PANCREAS TRANSPLANT: ICD-10-CM

## 2022-12-23 RX ORDER — MYCOPHENOLATE MOFETIL 250 MG/1
500 CAPSULE ORAL 2 TIMES DAILY
Qty: 120 CAPSULE | Refills: 11 | Status: ON HOLD | OUTPATIENT
Start: 2022-12-23 | End: 2023-01-24 | Stop reason: HOSPADM

## 2022-12-23 RX ORDER — VALGANCICLOVIR 450 MG/1
900 TABLET, FILM COATED ORAL DAILY
Qty: 60 TABLET | Refills: 1 | Status: SHIPPED | OUTPATIENT
Start: 2022-12-23 | End: 2023-02-01 | Stop reason: ALTCHOICE

## 2022-12-23 NOTE — TELEPHONE ENCOUNTER
Patient informed of medication changes. She verbalizes understanding. Message sent to patient via [facility] detailing changes.      ----- Message from Tammie Broussard DO sent at 12/23/2022  2:25 PM CST -----  CMV negative x 2; change Valcyte 450 BID to 900 mg daily  Restart her on  mg BID   Check CMV PCR in 2 weeks

## 2022-12-27 ENCOUNTER — LAB VISIT (OUTPATIENT)
Dept: LAB | Facility: HOSPITAL | Age: 27
End: 2022-12-27
Attending: INTERNAL MEDICINE
Payer: MEDICARE

## 2022-12-27 ENCOUNTER — PATIENT MESSAGE (OUTPATIENT)
Dept: TRANSPLANT | Facility: CLINIC | Age: 27
End: 2022-12-27
Payer: MEDICARE

## 2022-12-27 DIAGNOSIS — Z94.83 STATUS POST PANCREAS TRANSPLANTATION: ICD-10-CM

## 2022-12-27 DIAGNOSIS — Z94.83 STATUS POST SIMULTANEOUS KIDNEY AND PANCREAS TRANSPLANT: ICD-10-CM

## 2022-12-27 DIAGNOSIS — Z94.0 KIDNEY REPLACED BY TRANSPLANT: ICD-10-CM

## 2022-12-27 DIAGNOSIS — Z94.0 STATUS POST SIMULTANEOUS KIDNEY AND PANCREAS TRANSPLANT: ICD-10-CM

## 2022-12-27 LAB
ALBUMIN SERPL BCP-MCNC: 3.2 G/DL (ref 3.5–5.2)
AMYLASE SERPL-CCNC: 154 U/L (ref 20–110)
ANION GAP SERPL CALC-SCNC: 6 MMOL/L (ref 8–16)
BASOPHILS # BLD AUTO: 0.09 K/UL (ref 0–0.2)
BASOPHILS NFR BLD: 1.3 % (ref 0–1.9)
BUN SERPL-MCNC: 26 MG/DL (ref 6–20)
CALCIUM SERPL-MCNC: 9.8 MG/DL (ref 8.7–10.5)
CHLORIDE SERPL-SCNC: 112 MMOL/L (ref 95–110)
CO2 SERPL-SCNC: 21 MMOL/L (ref 23–29)
CREAT SERPL-MCNC: 1.1 MG/DL (ref 0.5–1.4)
DIFFERENTIAL METHOD: ABNORMAL
EOSINOPHIL # BLD AUTO: 0.1 K/UL (ref 0–0.5)
EOSINOPHIL NFR BLD: 1.4 % (ref 0–8)
ERYTHROCYTE [DISTWIDTH] IN BLOOD BY AUTOMATED COUNT: 17.8 % (ref 11.5–14.5)
EST. GFR  (NO RACE VARIABLE): >60 ML/MIN/1.73 M^2
GLUCOSE SERPL-MCNC: 60 MG/DL (ref 70–110)
HCT VFR BLD AUTO: 36.9 % (ref 37–48.5)
HGB BLD-MCNC: 10.9 G/DL (ref 12–16)
IMM GRANULOCYTES # BLD AUTO: 0.05 K/UL (ref 0–0.04)
IMM GRANULOCYTES NFR BLD AUTO: 0.7 % (ref 0–0.5)
LIPASE SERPL-CCNC: 27 U/L (ref 4–60)
LYMPHOCYTES # BLD AUTO: 0.7 K/UL (ref 1–4.8)
LYMPHOCYTES NFR BLD: 10.1 % (ref 18–48)
MAGNESIUM SERPL-MCNC: 1.9 MG/DL (ref 1.6–2.6)
MCH RBC QN AUTO: 29.5 PG (ref 27–31)
MCHC RBC AUTO-ENTMCNC: 29.5 G/DL (ref 32–36)
MCV RBC AUTO: 100 FL (ref 82–98)
MONOCYTES # BLD AUTO: 0.2 K/UL (ref 0.3–1)
MONOCYTES NFR BLD: 2.3 % (ref 4–15)
NEUTROPHILS # BLD AUTO: 5.9 K/UL (ref 1.8–7.7)
NEUTROPHILS NFR BLD: 84.2 % (ref 38–73)
NRBC BLD-RTO: 0 /100 WBC
PHOSPHATE SERPL-MCNC: 2.5 MG/DL (ref 2.7–4.5)
PLATELET # BLD AUTO: 399 K/UL (ref 150–450)
PMV BLD AUTO: 10 FL (ref 9.2–12.9)
POTASSIUM SERPL-SCNC: 5 MMOL/L (ref 3.5–5.1)
RBC # BLD AUTO: 3.7 M/UL (ref 4–5.4)
SODIUM SERPL-SCNC: 139 MMOL/L (ref 136–145)
TACROLIMUS BLD-MCNC: 4.7 NG/ML (ref 5–15)
WBC # BLD AUTO: 7.01 K/UL (ref 3.9–12.7)

## 2022-12-27 PROCEDURE — 83690 ASSAY OF LIPASE: CPT | Performed by: INTERNAL MEDICINE

## 2022-12-27 PROCEDURE — 85025 COMPLETE CBC W/AUTO DIFF WBC: CPT | Performed by: INTERNAL MEDICINE

## 2022-12-27 PROCEDURE — 36415 COLL VENOUS BLD VENIPUNCTURE: CPT | Performed by: INTERNAL MEDICINE

## 2022-12-27 PROCEDURE — 80197 ASSAY OF TACROLIMUS: CPT | Performed by: INTERNAL MEDICINE

## 2022-12-27 PROCEDURE — 80069 RENAL FUNCTION PANEL: CPT | Performed by: INTERNAL MEDICINE

## 2022-12-27 PROCEDURE — 82150 ASSAY OF AMYLASE: CPT | Performed by: INTERNAL MEDICINE

## 2022-12-27 PROCEDURE — 83735 ASSAY OF MAGNESIUM: CPT | Performed by: INTERNAL MEDICINE

## 2022-12-27 NOTE — TELEPHONE ENCOUNTER
Patient states level was accurate level. Verbalizes understanding of dosage change. Does not have any 0.5mg Prograf capsules so will need to get prescription filled. Will plan for repeat level later this week. Patient states she is feeling well, appetite is good and no nausea/vomiting/diarrhea.      ----- Message from Tammie Broussard DO sent at 12/27/2022  3:20 PM CST -----  Low FK. Is it a true trough? Increase FK to 3.5 BID   Recheck level on Thursday

## 2022-12-28 RX ORDER — TACROLIMUS 1 MG/1
CAPSULE ORAL
Qty: 270 CAPSULE | Refills: 11 | Status: SHIPPED | OUTPATIENT
Start: 2022-12-28 | End: 2023-01-03 | Stop reason: DRUGHIGH

## 2022-12-28 RX ORDER — TACROLIMUS 0.5 MG/1
0.5 CAPSULE ORAL EVERY 12 HOURS
Qty: 60 CAPSULE | Refills: 11 | Status: SHIPPED | OUTPATIENT
Start: 2022-12-28 | End: 2023-01-03 | Stop reason: DRUGHIGH

## 2022-12-30 ENCOUNTER — TELEPHONE (OUTPATIENT)
Dept: TRANSPLANT | Facility: CLINIC | Age: 27
End: 2022-12-30
Payer: MEDICARE

## 2022-12-30 ENCOUNTER — PATIENT MESSAGE (OUTPATIENT)
Dept: OBSTETRICS AND GYNECOLOGY | Facility: CLINIC | Age: 27
End: 2022-12-30
Payer: MEDICARE

## 2022-12-30 ENCOUNTER — PATIENT MESSAGE (OUTPATIENT)
Dept: TRANSPLANT | Facility: CLINIC | Age: 27
End: 2022-12-30
Payer: MEDICARE

## 2022-12-30 NOTE — TELEPHONE ENCOUNTER
----- Message from Renée Green RN sent at 12/30/2022  3:33 PM CST -----  This is a patient of Dr. Steven. She got a KP on 11/3/22. She did have CMV and diarrhea but that is all resolved now. She just contacted me saying her blood pressures are running low and she is orthostatic. This happened to her on christmas but resolved she said. Then yesterday/today it is happening again. Her standing BP is 84-91/70s and her HR is in 120s when she stands. She said she does feel dizzy when she stands. She is not currently taking any BP medications and is drinking ~2L of water daily.

## 2022-12-30 NOTE — TELEPHONE ENCOUNTER
Instructed patient to go to ED is symptomatic orthostatic hypotension continues or worsens. She verbalizes understanding. She states she has been eating a lot of high sodium foods to try to help increase BP. Dr Martineseld aware of this plan.

## 2022-12-30 NOTE — TELEPHONE ENCOUNTER
Patient states she is orthostatic and symptomatic. Reports that this happened to her on otilia but it resolved. Yesterday and today though she noticed feeling dizzy when standing up. Her standing BPs today have range 80s-90s/70s and her HR increases to 120s while standing. She is not currently taking any blood pressure medications and reports drinking 2L of water daily. Denies any vomiting or diarrhea since discharge from hospital. Instructed to increase water intake to 2.5-3L daily and will notify patient when coordinator hears plan from MD regarding BP.

## 2022-12-30 NOTE — TELEPHONE ENCOUNTER
Please confirm she is not taking nifedipine (and remove from active meds).  She should increase sodium in diet.  If BP remains low or she feels poorly, she should report to nearest ED for evaluation (sepsis can present with low BP)

## 2023-01-03 ENCOUNTER — OFFICE VISIT (OUTPATIENT)
Dept: ENDOCRINOLOGY | Facility: CLINIC | Age: 28
End: 2023-01-03
Payer: MEDICARE

## 2023-01-03 ENCOUNTER — PATIENT MESSAGE (OUTPATIENT)
Dept: TRANSPLANT | Facility: CLINIC | Age: 28
End: 2023-01-03
Payer: MEDICARE

## 2023-01-03 ENCOUNTER — LAB VISIT (OUTPATIENT)
Dept: LAB | Facility: HOSPITAL | Age: 28
End: 2023-01-03
Attending: INTERNAL MEDICINE
Payer: MEDICARE

## 2023-01-03 ENCOUNTER — DOCUMENTATION ONLY (OUTPATIENT)
Dept: TRANSPLANT | Facility: CLINIC | Age: 28
End: 2023-01-03
Payer: MEDICARE

## 2023-01-03 VITALS
DIASTOLIC BLOOD PRESSURE: 70 MMHG | BODY MASS INDEX: 22.49 KG/M2 | SYSTOLIC BLOOD PRESSURE: 118 MMHG | WEIGHT: 131.75 LBS | HEIGHT: 64 IN

## 2023-01-03 DIAGNOSIS — Z94.83 STATUS POST SIMULTANEOUS KIDNEY AND PANCREAS TRANSPLANT: ICD-10-CM

## 2023-01-03 DIAGNOSIS — Z86.39 HISTORY OF DIABETES MELLITUS, TYPE I: Primary | ICD-10-CM

## 2023-01-03 DIAGNOSIS — Z94.0 STATUS POST DECEASED-DONOR KIDNEY TRANSPLANTATION: ICD-10-CM

## 2023-01-03 DIAGNOSIS — Z94.0 KIDNEY REPLACED BY TRANSPLANT: ICD-10-CM

## 2023-01-03 DIAGNOSIS — E10.3521 TYPE 1 DIABETES MELLITUS WITH PROLIFERATIVE DIABETIC RETINOPATHY WITH TRACTION RETINAL DETACHMENT INVOLVING THE MACULA, RIGHT EYE: ICD-10-CM

## 2023-01-03 DIAGNOSIS — Z79.52 LONG TERM SYSTEMIC STEROID USER: ICD-10-CM

## 2023-01-03 DIAGNOSIS — N25.81 SECONDARY HYPERPARATHYROIDISM: ICD-10-CM

## 2023-01-03 DIAGNOSIS — E55.9 VITAMIN D DEFICIENCY: ICD-10-CM

## 2023-01-03 DIAGNOSIS — Z94.83 STATUS POST PANCREAS TRANSPLANTATION: ICD-10-CM

## 2023-01-03 DIAGNOSIS — Z94.0 STATUS POST SIMULTANEOUS KIDNEY AND PANCREAS TRANSPLANT: ICD-10-CM

## 2023-01-03 LAB
ALBUMIN SERPL BCP-MCNC: 3.5 G/DL (ref 3.5–5.2)
ALBUMIN SERPL BCP-MCNC: 3.5 G/DL (ref 3.5–5.2)
ALP SERPL-CCNC: 176 U/L (ref 55–135)
ALT SERPL W/O P-5'-P-CCNC: 25 U/L (ref 10–44)
AMYLASE SERPL-CCNC: 120 U/L (ref 20–110)
ANION GAP SERPL CALC-SCNC: 7 MMOL/L (ref 8–16)
AST SERPL-CCNC: 19 U/L (ref 10–40)
BASOPHILS # BLD AUTO: 0.04 K/UL (ref 0–0.2)
BASOPHILS NFR BLD: 0.8 % (ref 0–1.9)
BILIRUB DIRECT SERPL-MCNC: 0.1 MG/DL (ref 0.1–0.3)
BILIRUB SERPL-MCNC: 0.3 MG/DL (ref 0.1–1)
BUN SERPL-MCNC: 19 MG/DL (ref 6–20)
CALCIUM SERPL-MCNC: 10.2 MG/DL (ref 8.7–10.5)
CHLORIDE SERPL-SCNC: 113 MMOL/L (ref 95–110)
CO2 SERPL-SCNC: 17 MMOL/L (ref 23–29)
CREAT SERPL-MCNC: 0.8 MG/DL (ref 0.5–1.4)
DIFFERENTIAL METHOD: ABNORMAL
EOSINOPHIL # BLD AUTO: 0 K/UL (ref 0–0.5)
EOSINOPHIL NFR BLD: 0.4 % (ref 0–8)
ERYTHROCYTE [DISTWIDTH] IN BLOOD BY AUTOMATED COUNT: 19.5 % (ref 11.5–14.5)
EST. GFR  (NO RACE VARIABLE): >60 ML/MIN/1.73 M^2
GLUCOSE SERPL-MCNC: 78 MG/DL (ref 70–110)
HCT VFR BLD AUTO: 35.1 % (ref 37–48.5)
HGB BLD-MCNC: 10.6 G/DL (ref 12–16)
IMM GRANULOCYTES # BLD AUTO: 0.02 K/UL (ref 0–0.04)
IMM GRANULOCYTES NFR BLD AUTO: 0.4 % (ref 0–0.5)
LIPASE SERPL-CCNC: 17 U/L (ref 4–60)
LYMPHOCYTES # BLD AUTO: 0.7 K/UL (ref 1–4.8)
LYMPHOCYTES NFR BLD: 13.7 % (ref 18–48)
MAGNESIUM SERPL-MCNC: 2 MG/DL (ref 1.6–2.6)
MCH RBC QN AUTO: 30.1 PG (ref 27–31)
MCHC RBC AUTO-ENTMCNC: 30.2 G/DL (ref 32–36)
MCV RBC AUTO: 100 FL (ref 82–98)
MONOCYTES # BLD AUTO: 0.1 K/UL (ref 0.3–1)
MONOCYTES NFR BLD: 1.9 % (ref 4–15)
NEUTROPHILS # BLD AUTO: 3.9 K/UL (ref 1.8–7.7)
NEUTROPHILS NFR BLD: 82.8 % (ref 38–73)
NRBC BLD-RTO: 0 /100 WBC
PHOSPHATE SERPL-MCNC: 2.5 MG/DL (ref 2.7–4.5)
PLATELET # BLD AUTO: 373 K/UL (ref 150–450)
PMV BLD AUTO: 10.1 FL (ref 9.2–12.9)
POTASSIUM SERPL-SCNC: 4.7 MMOL/L (ref 3.5–5.1)
PROT SERPL-MCNC: 8 G/DL (ref 6–8.4)
RBC # BLD AUTO: 3.52 M/UL (ref 4–5.4)
SODIUM SERPL-SCNC: 137 MMOL/L (ref 136–145)
TACROLIMUS BLD-MCNC: 5.9 NG/ML (ref 5–15)
WBC # BLD AUTO: 4.73 K/UL (ref 3.9–12.7)

## 2023-01-03 PROCEDURE — 87799 DETECT AGENT NOS DNA QUANT: CPT | Performed by: INTERNAL MEDICINE

## 2023-01-03 PROCEDURE — 85025 COMPLETE CBC W/AUTO DIFF WBC: CPT | Performed by: INTERNAL MEDICINE

## 2023-01-03 PROCEDURE — 3078F PR MOST RECENT DIASTOLIC BLOOD PRESSURE < 80 MM HG: ICD-10-PCS | Mod: CPTII,S$GLB,, | Performed by: INTERNAL MEDICINE

## 2023-01-03 PROCEDURE — 99999 PR PBB SHADOW E&M-EST. PATIENT-LVL III: CPT | Mod: PBBFAC,,, | Performed by: INTERNAL MEDICINE

## 2023-01-03 PROCEDURE — 83735 ASSAY OF MAGNESIUM: CPT | Performed by: INTERNAL MEDICINE

## 2023-01-03 PROCEDURE — 99214 OFFICE O/P EST MOD 30 MIN: CPT | Mod: S$GLB,,, | Performed by: INTERNAL MEDICINE

## 2023-01-03 PROCEDURE — 36415 COLL VENOUS BLD VENIPUNCTURE: CPT | Performed by: INTERNAL MEDICINE

## 2023-01-03 PROCEDURE — 3008F BODY MASS INDEX DOCD: CPT | Mod: CPTII,S$GLB,, | Performed by: INTERNAL MEDICINE

## 2023-01-03 PROCEDURE — 3074F PR MOST RECENT SYSTOLIC BLOOD PRESSURE < 130 MM HG: ICD-10-PCS | Mod: CPTII,S$GLB,, | Performed by: INTERNAL MEDICINE

## 2023-01-03 PROCEDURE — 80197 ASSAY OF TACROLIMUS: CPT | Performed by: INTERNAL MEDICINE

## 2023-01-03 PROCEDURE — 3074F SYST BP LT 130 MM HG: CPT | Mod: CPTII,S$GLB,, | Performed by: INTERNAL MEDICINE

## 2023-01-03 PROCEDURE — 83690 ASSAY OF LIPASE: CPT | Performed by: INTERNAL MEDICINE

## 2023-01-03 PROCEDURE — 3078F DIAST BP <80 MM HG: CPT | Mod: CPTII,S$GLB,, | Performed by: INTERNAL MEDICINE

## 2023-01-03 PROCEDURE — 80069 RENAL FUNCTION PANEL: CPT | Performed by: INTERNAL MEDICINE

## 2023-01-03 PROCEDURE — 99214 PR OFFICE/OUTPT VISIT, EST, LEVL IV, 30-39 MIN: ICD-10-PCS | Mod: S$GLB,,, | Performed by: INTERNAL MEDICINE

## 2023-01-03 PROCEDURE — 84075 ASSAY ALKALINE PHOSPHATASE: CPT | Performed by: INTERNAL MEDICINE

## 2023-01-03 PROCEDURE — 1111F DSCHRG MED/CURRENT MED MERGE: CPT | Mod: CPTII,S$GLB,, | Performed by: INTERNAL MEDICINE

## 2023-01-03 PROCEDURE — 99999 PR PBB SHADOW E&M-EST. PATIENT-LVL III: ICD-10-PCS | Mod: PBBFAC,,, | Performed by: INTERNAL MEDICINE

## 2023-01-03 PROCEDURE — 3008F PR BODY MASS INDEX (BMI) DOCUMENTED: ICD-10-PCS | Mod: CPTII,S$GLB,, | Performed by: INTERNAL MEDICINE

## 2023-01-03 PROCEDURE — 1111F PR DISCHARGE MEDS RECONCILED W/ CURRENT OUTPATIENT MED LIST: ICD-10-PCS | Mod: CPTII,S$GLB,, | Performed by: INTERNAL MEDICINE

## 2023-01-03 PROCEDURE — 82150 ASSAY OF AMYLASE: CPT | Performed by: INTERNAL MEDICINE

## 2023-01-03 RX ORDER — TACROLIMUS 1 MG/1
CAPSULE ORAL
Qty: 270 CAPSULE | Refills: 11 | Status: SHIPPED | OUTPATIENT
Start: 2023-01-03 | End: 2023-01-09 | Stop reason: DRUGHIGH

## 2023-01-03 NOTE — TELEPHONE ENCOUNTER
Patient verbalizes understanding of dosage change.     ----- Message from Tammie Broussard DO sent at 1/3/2023 11:33 AM CST -----  Low FK. Increase FK to 4/3 from 3 BID   Recheck in 1 week

## 2023-01-05 ENCOUNTER — OFFICE VISIT (OUTPATIENT)
Dept: UROLOGY | Facility: CLINIC | Age: 28
End: 2023-01-05
Payer: MEDICARE

## 2023-01-05 VITALS
BODY MASS INDEX: 21.87 KG/M2 | HEART RATE: 110 BPM | SYSTOLIC BLOOD PRESSURE: 125 MMHG | WEIGHT: 127.44 LBS | DIASTOLIC BLOOD PRESSURE: 88 MMHG

## 2023-01-05 DIAGNOSIS — Z94.0 KIDNEY REPLACED BY TRANSPLANT: ICD-10-CM

## 2023-01-05 DIAGNOSIS — R33.9 URINARY RETENTION WITH INCOMPLETE BLADDER EMPTYING: Primary | ICD-10-CM

## 2023-01-05 PROBLEM — Z79.52 LONG TERM SYSTEMIC STEROID USER: Status: ACTIVE | Noted: 2023-01-05

## 2023-01-05 PROCEDURE — 99999 PR PBB SHADOW E&M-EST. PATIENT-LVL III: ICD-10-PCS | Mod: PBBFAC,,, | Performed by: NURSE PRACTITIONER

## 2023-01-05 PROCEDURE — 1111F PR DISCHARGE MEDS RECONCILED W/ CURRENT OUTPATIENT MED LIST: ICD-10-PCS | Mod: CPTII,S$GLB,, | Performed by: NURSE PRACTITIONER

## 2023-01-05 PROCEDURE — 1111F DSCHRG MED/CURRENT MED MERGE: CPT | Mod: CPTII,S$GLB,, | Performed by: NURSE PRACTITIONER

## 2023-01-05 PROCEDURE — 1160F RVW MEDS BY RX/DR IN RCRD: CPT | Mod: CPTII,S$GLB,, | Performed by: NURSE PRACTITIONER

## 2023-01-05 PROCEDURE — 3074F PR MOST RECENT SYSTOLIC BLOOD PRESSURE < 130 MM HG: ICD-10-PCS | Mod: CPTII,S$GLB,, | Performed by: NURSE PRACTITIONER

## 2023-01-05 PROCEDURE — 1159F PR MEDICATION LIST DOCUMENTED IN MEDICAL RECORD: ICD-10-PCS | Mod: CPTII,S$GLB,, | Performed by: NURSE PRACTITIONER

## 2023-01-05 PROCEDURE — 3008F BODY MASS INDEX DOCD: CPT | Mod: CPTII,S$GLB,, | Performed by: NURSE PRACTITIONER

## 2023-01-05 PROCEDURE — 1160F PR REVIEW ALL MEDS BY PRESCRIBER/CLIN PHARMACIST DOCUMENTED: ICD-10-PCS | Mod: CPTII,S$GLB,, | Performed by: NURSE PRACTITIONER

## 2023-01-05 PROCEDURE — 1159F MED LIST DOCD IN RCRD: CPT | Mod: CPTII,S$GLB,, | Performed by: NURSE PRACTITIONER

## 2023-01-05 PROCEDURE — 3079F PR MOST RECENT DIASTOLIC BLOOD PRESSURE 80-89 MM HG: ICD-10-PCS | Mod: CPTII,S$GLB,, | Performed by: NURSE PRACTITIONER

## 2023-01-05 PROCEDURE — 99204 OFFICE O/P NEW MOD 45 MIN: CPT | Mod: S$GLB,,, | Performed by: NURSE PRACTITIONER

## 2023-01-05 PROCEDURE — 3079F DIAST BP 80-89 MM HG: CPT | Mod: CPTII,S$GLB,, | Performed by: NURSE PRACTITIONER

## 2023-01-05 PROCEDURE — 99204 PR OFFICE/OUTPT VISIT, NEW, LEVL IV, 45-59 MIN: ICD-10-PCS | Mod: S$GLB,,, | Performed by: NURSE PRACTITIONER

## 2023-01-05 PROCEDURE — 99999 PR PBB SHADOW E&M-EST. PATIENT-LVL III: CPT | Mod: PBBFAC,,, | Performed by: NURSE PRACTITIONER

## 2023-01-05 PROCEDURE — 3008F PR BODY MASS INDEX (BMI) DOCUMENTED: ICD-10-PCS | Mod: CPTII,S$GLB,, | Performed by: NURSE PRACTITIONER

## 2023-01-05 PROCEDURE — 3074F SYST BP LT 130 MM HG: CPT | Mod: CPTII,S$GLB,, | Performed by: NURSE PRACTITIONER

## 2023-01-05 NOTE — ASSESSMENT & PLAN NOTE
Resolved   Not using any insulin   Plan to check cpeptide and a1c  Ok to monitor every 6 - 12 months

## 2023-01-05 NOTE — PROGRESS NOTES
CHIEF COMPLAINT:    Mrs. Read is a 27 y.o. female presenting for   Chief Complaint   Patient presents with    Follow-up     Void[address]     .  PRESENTING ILLNESS:    Isabela Read is a 27 y.o. female with a PMH of ESRD secondary to diabetic nephropathy and HTN, now s/p SPK 11/3/22 (Thymo induction, CMV D-/R+). who presents for urinary retention post transplant..     Established patient of urology department. Presents today due to urinary retention.  Initially post-op course was complicated by urinary retention requiring Dc replacement, then resolved. Recently admitted to the hospital and discharged 11/29/22 after presenting with nausea/vomiting, GEORGE, and had fever. Infectious work up performed. Urine cx had +multiple organisms none in predominance. Kidney US with edema/possible pyelo, ID consulted. She was initially treated with broad spectrum antibiotics. Discharged on PO cipro x 7 days.       Ureteral stent removed outpatient 11/30/22 with Dr. Ruiz. Sent to the ED after episode of vomiting in infusion center 12/1/22 to get IVF.  In ED, she is noted to be tachycardic in the 130s, blood pressure 90s, along with low grade fever 100.4. EKG shows sinus tachycardia. Creatinine remains elevated above baseline. Per chart review she had a temp of 103 on 12/2 and 100.7 on 12/3 @0700. Her dc catheter has been draining with a small amount of mucus. Urology consulted.  Urology removed dc catheter there was a large amount of mucus at the tip. Upsized the dc catheter 12/5/22 to 22 Fr and was draining well.  Of note creatine and hydronephrosis improved.  Will plan for voiding trial today.      Urine culture: 12/12/22 negative    12/5/22 negative    12/1/22 multiple organisms      REVIEW OF SYSTEMS:    Review of Systems   Constitutional:  Negative for chills and fever.   Respiratory:  Negative for shortness of breath.    Cardiovascular:  Negative for chest pain.   Gastrointestinal:  Negative for  constipation and diarrhea.   Genitourinary:  Negative for dysuria, flank pain, frequency, hematuria and urgency.   Neurological:  Negative for dizziness and weakness.      PATIENT HISTORY:    Past Medical History:   Diagnosis Date    Acute cystitis without hematuria 11/25/2022    Acute kidney injury superimposed on chronic kidney disease 4/7/2021    Anemia     Bilious vomiting with nausea 12/12/2022    Chronic hypertension with exacerbation during pregnancy in second trimester 11/6/2020    Current regimen (11/6/20):  - Carvedilol 12.5 mg BID - Nifedipine 30 mg daily Baseline CKD + proteinuria    Chronic kidney disease     Depression     Diabetes mellitus     Diabetic retinopathy     Diarrhea     Encounter for blood transfusion     End stage renal failure on dialysis     Fever 12/1/2022    Fever of undetermined origin 12/12/2022    Gastroparesis     Glaucoma     Hepatomegaly 4/29/2021    History of diabetes mellitus, type I 12/20/2022    Hx of psychiatric care     Hyperlipidemia     Hypertension     Nausea and vomiting 12/12/2022    Nephrotic syndrome     Nephrotic syndrome 3/4/2021    Nonspecific reaction to tuberculin skin test without active tuberculosis 10/1/2021    Palpitations     Poor fetal growth affecting management of mother in second trimester 10/15/2020    Pyelonephritis, acute 11/26/2022    Restrictive lung disease     Retinal detachment     Sepsis 12/1/2022    Sepsis 11/25/2022    Severe pre-eclampsia in second trimester 11/6/2020    SIRS (systemic inflammatory response syndrome) 12/6/2022    Type 1 diabetes mellitus with end-stage renal disease (ESRD) 2/24/2015    Followed by Dr. Mayo.  Last A1C was 13  Currently on lantus 20 units qAM and humolog 10 units with meals  - a fetal echocardiogram around 22-24 weeks - needs eye exam, podiatry exam  - needs EKG, maternal echo and fu with cardiology  - ASA 81mg, folic acid 4mg PO daily - hemoglobin A1c every 4-6 weeks -24 hour urine for protein and creatinine  clearance should be performed. Patient will also need a       Family History   Problem Relation Age of Onset    Hypertension Mother     Heart disease Father     Diabetes Brother     Celiac disease Neg Hx     Cirrhosis Neg Hx     Colon cancer Neg Hx     Colon polyps Neg Hx     Crohn's disease Neg Hx     Inflammatory bowel disease Neg Hx     Liver cancer Neg Hx     Liver disease Neg Hx     Rectal cancer Neg Hx     Stomach cancer Neg Hx     Ulcerative colitis Neg Hx     Esophageal cancer Neg Hx     Hemochromatosis Neg Hx     Pancreatic cancer Neg Hx     Kidney cancer Neg Hx     Bladder Cancer Neg Hx     Uterine cancer Neg Hx     Ovarian cancer Neg Hx        Allergies:  Patient has no known allergies.    Medications:    Current Outpatient Medications:     aspirin (ECOTRIN) 81 MG EC tablet, Take 81 mg by mouth once daily., Disp: , Rfl:     ergocalciferol (ERGOCALCIFEROL) 50,000 unit Cap, Take 1 capsule (50,000 Units total) by mouth every 7 days., Disp: 4 capsule, Rfl: 5    k phos di & mono-sod phos mono (K-PHOS-NEUTRAL) 250 mg Tab, Take 1 tablet by mouth 2 (two) times a day., Disp: 60 tablet, Rfl: 11    magnesium oxide (MAG-OX) 400 mg (241.3 mg magnesium) tablet, Take 1 tablet (400 mg total) by mouth 2 (two) times daily., Disp: 60 tablet, Rfl: 11    metoprolol tartrate (LOPRESSOR) 25 MG tablet, Take 1 tablet (25 mg total) by mouth 2 (two) times daily. HOLD SBP <120 or HR <60, Disp: 60 tablet, Rfl: 11    multivitamin (THERAGRAN) per tablet, Take 1 tablet by mouth once daily., Disp: 30 tablet, Rfl: 5    mycophenolate (CELLCEPT) 250 mg Cap, Take 2 capsules (500 mg total) by mouth 2 (two) times daily., Disp: 120 capsule, Rfl: 11    ondansetron (ZOFRAN-ODT) 8 MG TbDL, DISSOLVE 1 tablet (8 mg total) by mouth every 8 (eight) hours as needed (nausea)., Disp: 30 tablet, Rfl: 1    predniSONE (DELTASONE) 5 MG tablet, Take 1 tablet (5 mg total) by mouth once daily., Disp: 30 tablet, Rfl: 11    promethazine (PHENERGAN) 12.5 MG Tab,  Take 1 tablet (12.5 mg total) by mouth every 6 (six) hours as needed (nausea)., Disp: 30 tablet, Rfl: 1    rosuvastatin (CRESTOR) 10 MG tablet, Take 1 tablet (10 mg total) by mouth every evening., Disp: 90 tablet, Rfl: 0    sodium bicarbonate 650 MG tablet, Take 2 tablets (1,300 mg total) by mouth 3 (three) times daily., Disp: 180 tablet, Rfl: 11    sulfamethoxazole-trimethoprim 400-80mg (BACTRIM,SEPTRA) 400-80 mg per tablet, Take 1 tablet by mouth once daily. Stop 5/2/2023, Disp: 30 tablet, Rfl: 5    tacrolimus (PROGRAF) 1 MG Cap, Take 4 capsules (4 mg total) by mouth every morning AND 3 capsules (3 mg total) every evening. Take 3 1mg capsules PLUS 1 0.5mg capsule every morning AND every evening for total dose of 3.5mg twice daily., Disp: 270 capsule, Rfl: 11    valGANciclovir (VALCYTE) 450 mg Tab, Take 2 tablets (900 mg total) by mouth once daily. Stop 2/2/2023, Disp: 60 tablet, Rfl: 1    venlafaxine (EFFEXOR XR) 37.5 MG 24 hr capsule, Take 1 capsule (37.5 mg total) by mouth once daily., Disp: 30 capsule, Rfl: 11    PHYSICAL EXAMINATION:    Physical Exam  Vitals and nursing note reviewed.   Constitutional:       Appearance: Normal appearance. She is well-developed.   HENT:      Head: Normocephalic and atraumatic.   Eyes:      Pupils: Pupils are equal, round, and reactive to light.   Pulmonary:      Effort: Pulmonary effort is normal.   Genitourinary:     Comments: Moncada to leg bag - clear yellow  Musculoskeletal:         General: Normal range of motion.      Cervical back: Normal range of motion.   Skin:     General: Skin is warm and dry.   Neurological:      Mental Status: She is alert and oriented to person, place, and time.   Psychiatric:         Behavior: Behavior normal.         LABS:      IMPRESSION:    Encounter Diagnoses   Name Primary?    Urinary retention with incomplete bladder emptying Yes    Kidney replaced by transplant      Kidney transplant u/s 12/11/22:  Impression:     Satisfactory doppler  evaluation of renal allograft.     Stable mild hydronephrosis.    PLAN:    Problem List Items Addressed This Visit       Urinary retention with incomplete bladder emptying - Primary     Other Visit Diagnoses       Kidney replaced by transplant                1. Urinary retention   - Voiding trial performed by Nurse Travon.  200 ml of sterile water was instilled into bladder.  Dc catheter was removed. Patient urinated 350 ml without difficulty.     Voiding trial passed  Patient was instructed to drink plenty of fluids today.  Instructed patient to call at 1p.m. to give an update on urine output.  I instructed patient to return to clinic or emergency department (if after clinic hours) to have dc catheter put back in if unable to urinate within 5 hours of dc catheter removal or starts to experience bladder pressure/pain, decrease flow, straining/difficulty urinating, urinary frequency.    Patient voiced understanding.     Will plan to have patient return to clinic on Monday to check PVR, labs scheduled Monday morning to assess kidney function, would also consider repeating kidney ultrasound to ensure hydronephrosis resolved without dc in place    2. Kptx 11/3/2022 (Kidney / Pancreas)   F/u per transplant    3. Rtc 1/9/22      Cheyenne Romero NP        I spent over 45 minutes with the patient. Over 50% of the visit was spent in counseling.

## 2023-01-08 ENCOUNTER — PATIENT MESSAGE (OUTPATIENT)
Dept: OBSTETRICS AND GYNECOLOGY | Facility: CLINIC | Age: 28
End: 2023-01-08
Payer: MEDICARE

## 2023-01-09 ENCOUNTER — LAB VISIT (OUTPATIENT)
Dept: LAB | Facility: HOSPITAL | Age: 28
End: 2023-01-09
Attending: INTERNAL MEDICINE
Payer: MEDICARE

## 2023-01-09 ENCOUNTER — CLINICAL SUPPORT (OUTPATIENT)
Dept: UROLOGY | Facility: CLINIC | Age: 28
End: 2023-01-09
Payer: MEDICARE

## 2023-01-09 ENCOUNTER — PATIENT MESSAGE (OUTPATIENT)
Dept: TRANSPLANT | Facility: CLINIC | Age: 28
End: 2023-01-09

## 2023-01-09 ENCOUNTER — OFFICE VISIT (OUTPATIENT)
Dept: TRANSPLANT | Facility: CLINIC | Age: 28
End: 2023-01-09
Payer: MEDICARE

## 2023-01-09 VITALS
DIASTOLIC BLOOD PRESSURE: 55 MMHG | SYSTOLIC BLOOD PRESSURE: 93 MMHG | OXYGEN SATURATION: 98 % | WEIGHT: 134.25 LBS | BODY MASS INDEX: 22.92 KG/M2 | HEART RATE: 113 BPM | RESPIRATION RATE: 18 BRPM | TEMPERATURE: 97 F | HEIGHT: 64 IN

## 2023-01-09 VITALS
BODY MASS INDEX: 23 KG/M2 | DIASTOLIC BLOOD PRESSURE: 65 MMHG | HEART RATE: 124 BPM | SYSTOLIC BLOOD PRESSURE: 92 MMHG | WEIGHT: 134 LBS

## 2023-01-09 DIAGNOSIS — Z94.0 KIDNEY REPLACED BY TRANSPLANT: Primary | ICD-10-CM

## 2023-01-09 DIAGNOSIS — Z94.0 STATUS POST DECEASED-DONOR KIDNEY TRANSPLANTATION: ICD-10-CM

## 2023-01-09 DIAGNOSIS — B25.9 CYTOMEGALOVIRUS INFECTION, UNSPECIFIED CYTOMEGALOVIRAL INFECTION TYPE: ICD-10-CM

## 2023-01-09 DIAGNOSIS — Z91.89 AT RISK FOR OPPORTUNISTIC INFECTIONS: ICD-10-CM

## 2023-01-09 DIAGNOSIS — D64.89 ANEMIA DUE TO OTHER CAUSE, NOT CLASSIFIED: ICD-10-CM

## 2023-01-09 DIAGNOSIS — Z94.83 STATUS POST SIMULTANEOUS KIDNEY AND PANCREAS TRANSPLANT: ICD-10-CM

## 2023-01-09 DIAGNOSIS — Z94.0 KIDNEY REPLACED BY TRANSPLANT: ICD-10-CM

## 2023-01-09 DIAGNOSIS — Z29.89 PROPHYLACTIC IMMUNOTHERAPY: Primary | ICD-10-CM

## 2023-01-09 DIAGNOSIS — Z94.83 STATUS POST PANCREAS TRANSPLANTATION: ICD-10-CM

## 2023-01-09 DIAGNOSIS — Z94.0 STATUS POST SIMULTANEOUS KIDNEY AND PANCREAS TRANSPLANT: ICD-10-CM

## 2023-01-09 DIAGNOSIS — I10 HYPERTENSION, ESSENTIAL: ICD-10-CM

## 2023-01-09 DIAGNOSIS — R33.9 URINARY RETENTION WITH INCOMPLETE BLADDER EMPTYING: ICD-10-CM

## 2023-01-09 LAB
ALBUMIN SERPL BCP-MCNC: 3.8 G/DL (ref 3.5–5.2)
AMYLASE SERPL-CCNC: 110 U/L (ref 20–110)
ANION GAP SERPL CALC-SCNC: 4 MMOL/L (ref 8–16)
BASOPHILS # BLD AUTO: 0.03 K/UL (ref 0–0.2)
BASOPHILS NFR BLD: 0.8 % (ref 0–1.9)
BUN SERPL-MCNC: 22 MG/DL (ref 6–20)
CALCIUM SERPL-MCNC: 10.8 MG/DL (ref 8.7–10.5)
CHLORIDE SERPL-SCNC: 110 MMOL/L (ref 95–110)
CO2 SERPL-SCNC: 20 MMOL/L (ref 23–29)
CREAT SERPL-MCNC: 0.8 MG/DL (ref 0.5–1.4)
DIFFERENTIAL METHOD: ABNORMAL
EOSINOPHIL # BLD AUTO: 0.1 K/UL (ref 0–0.5)
EOSINOPHIL NFR BLD: 1.5 % (ref 0–8)
ERYTHROCYTE [DISTWIDTH] IN BLOOD BY AUTOMATED COUNT: 20.3 % (ref 11.5–14.5)
EST. GFR  (NO RACE VARIABLE): >60 ML/MIN/1.73 M^2
GLUCOSE SERPL-MCNC: 77 MG/DL (ref 70–110)
HCT VFR BLD AUTO: 38.9 % (ref 37–48.5)
HGB BLD-MCNC: 11.8 G/DL (ref 12–16)
IMM GRANULOCYTES # BLD AUTO: 0.02 K/UL (ref 0–0.04)
IMM GRANULOCYTES NFR BLD AUTO: 0.5 % (ref 0–0.5)
LIPASE SERPL-CCNC: 18 U/L (ref 4–60)
LYMPHOCYTES # BLD AUTO: 0.6 K/UL (ref 1–4.8)
LYMPHOCYTES NFR BLD: 14.2 % (ref 18–48)
MCH RBC QN AUTO: 31 PG (ref 27–31)
MCHC RBC AUTO-ENTMCNC: 30.3 G/DL (ref 32–36)
MCV RBC AUTO: 102 FL (ref 82–98)
MONOCYTES # BLD AUTO: 0.2 K/UL (ref 0.3–1)
MONOCYTES NFR BLD: 3.8 % (ref 4–15)
NEUTROPHILS # BLD AUTO: 3.1 K/UL (ref 1.8–7.7)
NEUTROPHILS NFR BLD: 79.2 % (ref 38–73)
NRBC BLD-RTO: 0 /100 WBC
PHOSPHATE SERPL-MCNC: 3.3 MG/DL (ref 2.7–4.5)
PLATELET # BLD AUTO: 431 K/UL (ref 150–450)
PMV BLD AUTO: 10.1 FL (ref 9.2–12.9)
POTASSIUM SERPL-SCNC: 5.2 MMOL/L (ref 3.5–5.1)
RBC # BLD AUTO: 3.81 M/UL (ref 4–5.4)
SODIUM SERPL-SCNC: 134 MMOL/L (ref 136–145)
TACROLIMUS BLD-MCNC: 6.2 NG/ML (ref 5–15)
WBC # BLD AUTO: 3.93 K/UL (ref 3.9–12.7)

## 2023-01-09 PROCEDURE — 3074F PR MOST RECENT SYSTOLIC BLOOD PRESSURE < 130 MM HG: ICD-10-PCS | Mod: CPTII,S$GLB,, | Performed by: INTERNAL MEDICINE

## 2023-01-09 PROCEDURE — 99215 PR OFFICE/OUTPT VISIT, EST, LEVL V, 40-54 MIN: ICD-10-PCS | Mod: S$GLB,,, | Performed by: INTERNAL MEDICINE

## 2023-01-09 PROCEDURE — 99999 PR PBB SHADOW E&M-EST. PATIENT-LVL III: CPT | Mod: PBBFAC,,, | Performed by: NURSE PRACTITIONER

## 2023-01-09 PROCEDURE — 3008F PR BODY MASS INDEX (BMI) DOCUMENTED: ICD-10-PCS | Mod: CPTII,S$GLB,, | Performed by: INTERNAL MEDICINE

## 2023-01-09 PROCEDURE — 99999 PR PBB SHADOW E&M-EST. PATIENT-LVL III: ICD-10-PCS | Mod: PBBFAC,,, | Performed by: NURSE PRACTITIONER

## 2023-01-09 PROCEDURE — 99999 PR PBB SHADOW E&M-EST. PATIENT-LVL IV: CPT | Mod: PBBFAC,,, | Performed by: INTERNAL MEDICINE

## 2023-01-09 PROCEDURE — 80069 RENAL FUNCTION PANEL: CPT | Performed by: INTERNAL MEDICINE

## 2023-01-09 PROCEDURE — 3074F SYST BP LT 130 MM HG: CPT | Mod: CPTII,S$GLB,, | Performed by: INTERNAL MEDICINE

## 2023-01-09 PROCEDURE — 3066F NEPHROPATHY DOC TX: CPT | Mod: CPTII,S$GLB,, | Performed by: INTERNAL MEDICINE

## 2023-01-09 PROCEDURE — 3078F DIAST BP <80 MM HG: CPT | Mod: CPTII,S$GLB,, | Performed by: INTERNAL MEDICINE

## 2023-01-09 PROCEDURE — 3078F PR MOST RECENT DIASTOLIC BLOOD PRESSURE < 80 MM HG: ICD-10-PCS | Mod: CPTII,S$GLB,, | Performed by: INTERNAL MEDICINE

## 2023-01-09 PROCEDURE — 80197 ASSAY OF TACROLIMUS: CPT | Performed by: INTERNAL MEDICINE

## 2023-01-09 PROCEDURE — 1111F PR DISCHARGE MEDS RECONCILED W/ CURRENT OUTPATIENT MED LIST: ICD-10-PCS | Mod: CPTII,S$GLB,, | Performed by: INTERNAL MEDICINE

## 2023-01-09 PROCEDURE — 83690 ASSAY OF LIPASE: CPT | Performed by: INTERNAL MEDICINE

## 2023-01-09 PROCEDURE — 3066F PR DOCUMENTATION OF TREATMENT FOR NEPHROPATHY: ICD-10-PCS | Mod: CPTII,S$GLB,, | Performed by: INTERNAL MEDICINE

## 2023-01-09 PROCEDURE — 3008F BODY MASS INDEX DOCD: CPT | Mod: CPTII,S$GLB,, | Performed by: INTERNAL MEDICINE

## 2023-01-09 PROCEDURE — 82150 ASSAY OF AMYLASE: CPT | Performed by: INTERNAL MEDICINE

## 2023-01-09 PROCEDURE — 36415 COLL VENOUS BLD VENIPUNCTURE: CPT | Performed by: INTERNAL MEDICINE

## 2023-01-09 PROCEDURE — 1111F DSCHRG MED/CURRENT MED MERGE: CPT | Mod: CPTII,S$GLB,, | Performed by: INTERNAL MEDICINE

## 2023-01-09 PROCEDURE — 85025 COMPLETE CBC W/AUTO DIFF WBC: CPT | Performed by: INTERNAL MEDICINE

## 2023-01-09 PROCEDURE — 99999 PR PBB SHADOW E&M-EST. PATIENT-LVL IV: ICD-10-PCS | Mod: PBBFAC,,, | Performed by: INTERNAL MEDICINE

## 2023-01-09 PROCEDURE — 99215 OFFICE O/P EST HI 40 MIN: CPT | Mod: S$GLB,,, | Performed by: INTERNAL MEDICINE

## 2023-01-09 RX ORDER — TACROLIMUS 1 MG/1
CAPSULE ORAL
Qty: 270 CAPSULE | Refills: 11 | Status: SHIPPED | OUTPATIENT
Start: 2023-01-09 | End: 2023-01-31 | Stop reason: DRUGHIGH

## 2023-01-09 RX ORDER — FLUDROCORTISONE ACETATE 0.1 MG/1
100 TABLET ORAL 2 TIMES DAILY PRN
Qty: 60 TABLET | Refills: 0 | Status: ON HOLD | OUTPATIENT
Start: 2023-01-09 | End: 2023-01-25 | Stop reason: HOSPADM

## 2023-01-09 RX ORDER — TACROLIMUS 1 MG/1
CAPSULE ORAL
Qty: 270 CAPSULE | Refills: 11 | Status: SHIPPED | OUTPATIENT
Start: 2023-01-09 | End: 2023-01-09 | Stop reason: SDUPTHER

## 2023-01-09 NOTE — TELEPHONE ENCOUNTER
Patient informed of dosage change. Plan to repeat labs next Monday    ----- Message from Tammie Broussard DO sent at 1/9/2023  1:05 PM CST -----  Labs reviewed with patient in clinic  FK was pending. Please have patient increase FK to 4 mg BID from 4/3  Repeat in 1 week

## 2023-01-09 NOTE — NURSING
Pt came in as per NP Nick request for a PVR check. Nurse scanned her bladder and results indicated 3 ml. Pt stated she has been urinating without difficulty. Nurse encouraged her to continue to drink water and not to hold her bladder for long periods of time. Pt verbalized understanding.

## 2023-01-09 NOTE — PROGRESS NOTES
Patient's PVR 3 ml.  Able to empty bladder.  Renal function remains wnl after catheter removal. Would recommend repeat renal ultrasound to ensure resolution of hydronephrosis.

## 2023-01-09 NOTE — PROGRESS NOTES
27 yr old AAF with type I DM s/p a simultaneous kidney and pancreas transplant on 11/3/22. 0% PRA. Thymo induction. Post transplant course complicated by N/V/GEORGE with urinary retention s/p dc placement (s/p removal), recent CMV infection as well as early kidney biopsy concerning for ABMR with negative DSA. Patient seen today and states that she has been doing well. Has not had any nausea or vomiting. Has gained back her weight and is at her dry weight.                 1) s/p simultaneous kidney and pancreas transplant:              - kidney and pancreas function at baseline              - has remaining dose of IVIG pending.               - will hold off on kidney biopsy at this time as patient's graft functions are at baseline              - f/u on FK. On FK 4/3               - cont on  mg BID for now (will increase it on next visit if she continues to do well)  - cont on prednisone 5 mg daily  - cont on bactrim and valcyte              - cont on ASA 81 mg daily  2) hypotension:              - hold metoprolol              - florinef 0.1 mg BID prn  3) Urinary retention s/p dc removal:              - will likely need a US kidney in the next few weeks              - has follow up with urology later today   4) CMV infection:              - resolved.   5) Hyperkalemia:              - started on florinef. Monitor   6) Metabolic acidosis:              - cont on sodium bicarb 1300 TID  7) Hypophosphatemia:              - Cont on Kphos 500 BID   8) Hypomagnesmemia:              - cont on MgOx 400 mg BID  9) hx of type I DM with retinopathy  10) Elevated LFT:              - followed by hepatology in the past with fibroscan performed concerning for fibrosis with MRI elastogram suspicious for iron overload         Tammie Broussard, DO  Transplant  [facility]

## 2023-01-09 NOTE — LETTER
January 9, 2023        Tai Camilo  [address]  [facility] [place] [facility] [address]  Phone: [phone]  Fax: [phone]             [facility]  [address]  [facility] [address]  Phone: [phone]   Patient: Isabela Read   MR Number: [id number]   YOB: 1995   Date of Visit: 1/9/2023       Dear Dr. Tai Camilo    Thank you for referring Isabela Read to me for evaluation. Attached you will find relevant portions of my assessment and plan of care.    If you have questions, please do not hesitate to call me. I look forward to following Isabela Read along with you.    Sincerely,    Tammie Broussard, DO    Enclosure    If you would like to receive this communication electronically, please contact [email or web address] or [phone] to request [employer] access.    [employer] is a tool which provides read-only access to select patient information with whom you have a relationship. Its easy to use and provides real time access to review your patients record including encounter summaries, notes, results, and demographic information.    If you feel you have received this communication in error or would no longer like to receive these types of communications, please e-mail [email or web address]

## 2023-01-09 NOTE — PROGRESS NOTES
Subjective     Chief Complaint: Follow up    History of Present Illness:  Ms. Isabela Read is a 27 y.o. female with a history of ESRD (DM/HTN)now KP transplant 11/3/22 (thymo, CMV D-/R+, KDPI 1%, CIT 5 hours), post op urinary retention presents for follow up.    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 After her discharge, she alerted the clinic that she had symptomatic orthostatic hypotension around 12/25/2022, it has mostly resolved. She states that she does feel somewhat lightheaded, but has not fallen or felt unsteady on her feet.    Maintenance regiment:  BID, tacro 4/3, pred 5  Prophylaxis: Bactrim 400-80, Valcyte 900 qd    Regarding her labs:  Hemoglobin 11.8, . Last B12 and folate within normal limits. Has been up trending. Is currently on valacyclovir, WBC and plt counts normal  Serum creatinine 0.8 (1/3/23) at baseline  Amylase: 110  Lipase 11    Review of Systems   Constitutional:  Negative for chills and fever.   HENT:  Negative for ear discharge and ear pain.    Eyes:  Negative for blurred vision and double vision.   Respiratory:  Negative for cough and shortness of breath.    Cardiovascular:  Negative for chest pain and palpitations.   Gastrointestinal:  Negative for abdominal pain, constipation, diarrhea, nausea and vomiting.   Genitourinary:  Negative for dysuria and frequency.   Musculoskeletal:  Negative for myalgias and neck pain.   Skin:  Negative for itching and rash.   Neurological:  Positive for sensory change (feels off when standing too quickly, denies lightheadedness). Negative for tingling and headaches.     PAST HISTORY:     Past Medical History:   Diagnosis Date    Acute cystitis without hematuria 11/25/2022    Acute kidney injury superimposed on chronic kidney disease 4/7/2021    Anemia     Bilious vomiting with nausea 12/12/2022     Chronic hypertension with exacerbation during pregnancy in second trimester 11/6/2020    Current regimen (11/6/20):  - Carvedilol 12.5 mg BID - Nifedipine 30 mg daily Baseline CKD + proteinuria    Chronic kidney disease     Depression     Diabetes mellitus     Diabetic retinopathy     Diarrhea     Encounter for blood transfusion     End stage renal failure on dialysis     Fever 12/1/2022    Fever of undetermined origin 12/12/2022    Gastroparesis     Glaucoma     Hepatomegaly 4/29/2021    History of diabetes mellitus, type I 12/20/2022    Hx of psychiatric care     Hyperlipidemia     Hypertension     Nausea and vomiting 12/12/2022    Nephrotic syndrome     Nephrotic syndrome 3/4/2021    Nonspecific reaction to tuberculin skin test without active tuberculosis 10/1/2021    Palpitations     Poor fetal growth affecting management of mother in second trimester 10/15/2020    Pyelonephritis, acute 11/26/2022    Restrictive lung disease     Retinal detachment     Sepsis 12/1/2022    Sepsis 11/25/2022    Severe pre-eclampsia in second trimester 11/6/2020    SIRS (systemic inflammatory response syndrome) 12/6/2022    Type 1 diabetes mellitus with end-stage renal disease (ESRD) 2/24/2015    Followed by Dr. Mayo.  Last A1C was 13  Currently on lantus 20 units qAM and humolog 10 units with meals  - a fetal echocardiogram around 22-24 weeks - needs eye exam, podiatry exam  - needs EKG, maternal echo and fu with cardiology  - ASA 81mg, folic acid 4mg PO daily - hemoglobin A1c every 4-6 weeks -24 hour urine for protein and creatinine clearance should be performed. Patient will also need a       Past Surgical History:   Procedure Laterality Date    AV FISTULA PLACEMENT Left 04/07/2021    Procedure: CREATION, AV FISTULA;  Surgeon: Roberto Ryan MD;  Location: [facility] OR [address] [place] [address];  Service: Peripheral Vascular;  Laterality: Left;    COLONOSCOPY N/A 03/16/2022    Procedure: COLONOSCOPY;  Surgeon: Tavo Kwok MD;  Location:  [facility] ENDO (4TH FLR);  Service: Endoscopy;  Laterality: N/A;  Questionable history of delayed gastric emptying longstanding diabetes now on eating pre transplant workup for history of nausea vomiting which seems to have improved with dialysis also chronic diarrhea and history of anemia pre transplant workup for kidney transplant. 3    CYSTOSCOPY      ESOPHAGOGASTRODUODENOSCOPY N/A 03/16/2022    Procedure: EGD (ESOPHAGOGASTRODUODENOSCOPY);  Surgeon: Tavo Kwok MD;  Location: [facility] ENDO (4TH FLR);  Service: Endoscopy;  Laterality: N/A;  Questionable history of delayed gastric emptying longstanding diabetes now on eating pre transplant workup for history of nausea vomiting which seems to have improved with dialysis also chronic diarrhea and history of anemia pre transplant workup for    FISTULOGRAM N/A 08/11/2021    Procedure: Fistulogram;  Surgeon: Roberto Ryan MD;  Location: [facility] CATH LAB;  Service: Cardiology;  Laterality: N/A;    KIDNEY TRANSPLANT Right 11/02/2022    Procedure: TRANSPLANT, KIDNEY;  Surgeon: Kumar Rodriges Jr., MD;  Location: [facility] OR 2ND FLR;  Service: Transplant;  Laterality: Right;    LASER PHOTOCOAGULATION OF RETINA Right 05/31/2022    Procedure: PHOTOCOAGULATION, RETINA, USING LASER;  Surgeon: Maximilian Montaño MD;  Location: [facility] OR 1ST FLR;  Service: Ophthalmology;  Laterality: Right;    PERCUTANEOUS TRANSLUMINAL ANGIOPLASTY OF ARTERIOVENOUS FISTULA N/A 08/11/2021    Procedure: PTA, AV FISTULA;  Surgeon: Roberto Ryan MD;  Location: [facility] CATH LAB;  Service: Cardiology;  Laterality: N/A;    REMOVAL IMPLANT, POSTERIOR SEGMENT, INTRAOCULAR Right 02/01/2022    Procedure: REMOVAL IMPLANT, POSTERIOR SEGMENT, INTRAOCULAR;  Surgeon: Maximilian Montaño MD;  Location: [facility] OR 1ST FLR;  Service: Ophthalmology;  Laterality: Right;    REPAIR OF RETINAL DETACHMENT WITH VITRECTOMY Right 01/25/2022    Procedure: REPAIR, RETINAL DETACHMENT, WITH VITRECTOMY, MEMBRANE PEEL, LASER,  INJECTION OF GAS VS OIL;  Surgeon: Maximilian Montaño MD;  Location: [facility] OR 1ST FLR;  Service: Ophthalmology;  Laterality: Right;    REVISION OF ARTERIOVENOUS FISTULA Left 12/10/2021    Procedure: REVISION, AV FISTULA with BVT;  Surgeon: Roberto Ryan MD;  Location: [facility] OR 2ND FLR;  Service: Peripheral Vascular;  Laterality: Left;    TRANSPLANTATION OF PANCREAS N/A 11/02/2022    Procedure: TRANSPLANT, PANCREAS;  Surgeon: Kumar Rodriges Jr., MD;  Location: [facility] OR 2ND FLR;  Service: Transplant;  Laterality: N/A;    VITRECTOMY BY PARS PLANA APPROACH Right 02/01/2022    Procedure: VITRECTOMY, PARS PLANA APPROACH;  Surgeon: Maximilian Montaño MD;  Location: [facility] OR 1ST FLR;  Service: Ophthalmology;  Laterality: Right;    VITRECTOMY BY PARS PLANA APPROACH Right 05/31/2022    Procedure: VITRECTOMY, PARS PLANA APPROACH;  Surgeon: Maximilian Montaño MD;  Location: [facility] OR 1ST FLR;  Service: Ophthalmology;  Laterality: Right;       Family History   Problem Relation Age of Onset    Hypertension Mother     Heart disease Father     Diabetes Brother     Celiac disease Neg Hx     Cirrhosis Neg Hx     Colon cancer Neg Hx     Colon polyps Neg Hx     Crohn's disease Neg Hx     Inflammatory bowel disease Neg Hx     Liver cancer Neg Hx     Liver disease Neg Hx     Rectal cancer Neg Hx     Stomach cancer Neg Hx     Ulcerative colitis Neg Hx     Esophageal cancer Neg Hx     Hemochromatosis Neg Hx     Pancreatic cancer Neg Hx     Kidney cancer Neg Hx     Bladder Cancer Neg Hx     Uterine cancer Neg Hx     Ovarian cancer Neg Hx        Social History     Socioeconomic History    Marital status: Single   Occupational History    Occupation:  at [employer]   Tobacco Use    Smoking status: Never    Smokeless tobacco: Never   Substance and Sexual Activity    Alcohol use: No    Drug use: No    Sexual activity: Not Currently     Partners: Male     Comment: monogamous   Social History Narrative    Caregiver        Single     No kids    Had Miscarriage  At 23 weeks from preeclampsia    Disabled       MEDICATIONS & ALLERGIES:     Current Outpatient Medications on File Prior to Visit   Medication Sig    aspirin (ECOTRIN) 81 MG EC tablet Take 81 mg by mouth once daily.    ergocalciferol (ERGOCALCIFEROL) 50,000 unit Cap Take 1 capsule (50,000 Units total) by mouth every 7 days.    k phos di & mono-sod phos mono (K-PHOS-NEUTRAL) 250 mg Tab Take 1 tablet by mouth 2 (two) times a day.    magnesium oxide (MAG-OX) 400 mg (241.3 mg magnesium) tablet Take 1 tablet (400 mg total) by mouth 2 (two) times daily.    metoprolol tartrate (LOPRESSOR) 25 MG tablet Take 1 tablet (25 mg total) by mouth 2 (two) times daily. HOLD SBP <120 or HR <60    multivitamin (THERAGRAN) per tablet Take 1 tablet by mouth once daily.    mycophenolate (CELLCEPT) 250 mg Cap Take 2 capsules (500 mg total) by mouth 2 (two) times daily.    ondansetron (ZOFRAN-ODT) 8 MG TbDL DISSOLVE 1 tablet (8 mg total) by mouth every 8 (eight) hours as needed (nausea).    predniSONE (DELTASONE) 5 MG tablet Take 1 tablet (5 mg total) by mouth once daily.    promethazine (PHENERGAN) 12.5 MG Tab Take 1 tablet (12.5 mg total) by mouth every 6 (six) hours as needed (nausea).    rosuvastatin (CRESTOR) 10 MG tablet Take 1 tablet (10 mg total) by mouth every evening.    sodium bicarbonate 650 MG tablet Take 2 tablets (1,300 mg total) by mouth 3 (three) times daily.    sulfamethoxazole-trimethoprim 400-80mg (BACTRIM,SEPTRA) 400-80 mg per tablet Take 1 tablet by mouth once daily. Stop 5/2/2023    tacrolimus (PROGRAF) 1 MG Cap Take 4 capsules (4 mg total) by mouth every morning AND 3 capsules (3 mg total) every evening. Take 3 1mg capsules PLUS 1 0.5mg capsule every morning AND every evening for total dose of 3.5mg twice daily.    valGANciclovir (VALCYTE) 450 mg Tab Take 2 tablets (900 mg total) by mouth once daily. Stop 2/2/2023    venlafaxine (EFFEXOR XR) 37.5 MG 24 hr capsule Take 1 capsule (37.5 mg  total) by mouth once daily.     No current facility-administered medications on file prior to visit.       Review of patient's allergies indicates:  No Known Allergies    OBJECTIVE:     Vital Signs:  There were no vitals filed for this visit.    There is no height or weight on file to calculate BMI.     Physical Exam  Constitutional:       General: She is not in acute distress.     Appearance: Normal appearance. She is not ill-appearing or diaphoretic.   HENT:      Head: Normocephalic and atraumatic.      Mouth/Throat:      Pharynx: No oropharyngeal exudate or posterior oropharyngeal erythema.   Eyes:      General: No scleral icterus.        Right eye: No discharge.         Left eye: No discharge.      Extraocular Movements: Extraocular movements intact.      Conjunctiva/sclera: Conjunctivae normal.      Pupils: Pupils are equal, round, and reactive to light.   Neck:      Vascular: No JVD.      Trachea: No tracheal deviation.   Cardiovascular:      Rate and Rhythm: Regular rhythm. Tachycardia present.      Heart sounds: No murmur heard.  Pulmonary:      Effort: Pulmonary effort is normal. No respiratory distress.      Breath sounds: Normal breath sounds. No wheezing or rales.   Abdominal:      General: Abdomen is flat. Bowel sounds are normal. There is no distension.      Palpations: Abdomen is soft. There is no mass.      Tenderness: There is no abdominal tenderness.   Musculoskeletal:         General: No swelling, tenderness or deformity. Normal range of motion.      Cervical back: Normal range of motion and neck supple.   Lymphadenopathy:      Cervical: No cervical adenopathy.   Skin:     General: Skin is warm and dry.      Coloration: Skin is not jaundiced or pale.      Findings: No bruising, erythema or rash.   Neurological:      General: No focal deficit present.      Mental Status: She is alert and oriented to person, place, and time. Mental status is at baseline.      Cranial Nerves: No cranial nerve  deficit.     Laboratory  No results found for this or any previous visit (from the past 24 hour(s)).    Diagnostic Results:      Health Maintenance Due   Topic Date Due    Foot Exam  10/21/2020    COVID-19 Vaccine (3 - Moderna risk series) 2021    Pap Smear  2022         ASSESSMENT & PLAN:   Ms. Isabela Read is a 27 y.o. female presents for follow up    Prophylactic immunotherapy  At risk for opportunistic infections  Status post -donor kidney transplantation  Status post pancreas transplantation  Continue  BID  Continue Tacro 4/3  Continue Pred 5  Continue bactrim  Continue valcyte  Given normal creatinine and normal function, will defer repeat biopsy at this time    Hypertension, essential, now orthostatic  -dc metoprolol  -discussed sitting down when symptomatic, discussed compression stocking  -PRN 0.1 mg fludrocortisone BID when standing BP < 90 systolic            RTC in per protocol    Discussed with Dr. Broussard  - staff attestation to follow      Anish Barillas MD  Nephrology PGY-4    [address], [place] [id number] [phone]

## 2023-01-10 LAB
BKV DNA SERPL NAA+PROBE-ACNC: <125 COPIES/ML
BKV DNA SERPL NAA+PROBE-LOG#: <2.1 LOG (10) COPIES/ML
BKV DNA SERPL QL NAA+PROBE: NOT DETECTED

## 2023-01-11 ENCOUNTER — PATIENT MESSAGE (OUTPATIENT)
Dept: TRANSPLANT | Facility: CLINIC | Age: 28
End: 2023-01-11
Payer: MEDICARE

## 2023-01-17 ENCOUNTER — PATIENT MESSAGE (OUTPATIENT)
Dept: TRANSPLANT | Facility: CLINIC | Age: 28
End: 2023-01-17
Payer: MEDICARE

## 2023-01-17 ENCOUNTER — LAB VISIT (OUTPATIENT)
Dept: LAB | Facility: HOSPITAL | Age: 28
End: 2023-01-17
Attending: INTERNAL MEDICINE
Payer: MEDICARE

## 2023-01-17 DIAGNOSIS — Z94.0 KIDNEY REPLACED BY TRANSPLANT: ICD-10-CM

## 2023-01-17 DIAGNOSIS — Z94.83 STATUS POST PANCREAS TRANSPLANTATION: ICD-10-CM

## 2023-01-17 DIAGNOSIS — Z94.0 KIDNEY REPLACED BY TRANSPLANT: Primary | ICD-10-CM

## 2023-01-17 DIAGNOSIS — E10.10 TYPE 1 DIABETES MELLITUS WITH KETOACIDOSIS WITHOUT COMA: ICD-10-CM

## 2023-01-17 LAB
ALBUMIN SERPL BCP-MCNC: 3.9 G/DL (ref 3.5–5.2)
AMYLASE SERPL-CCNC: 110 U/L (ref 20–110)
ANION GAP SERPL CALC-SCNC: 9 MMOL/L (ref 8–16)
BASOPHILS # BLD AUTO: 0.05 K/UL (ref 0–0.2)
BASOPHILS NFR BLD: 1.2 % (ref 0–1.9)
BUN SERPL-MCNC: 19 MG/DL (ref 6–20)
CALCIUM SERPL-MCNC: 10.1 MG/DL (ref 8.7–10.5)
CHLORIDE SERPL-SCNC: 110 MMOL/L (ref 95–110)
CO2 SERPL-SCNC: 18 MMOL/L (ref 23–29)
CREAT SERPL-MCNC: 1 MG/DL (ref 0.5–1.4)
DIFFERENTIAL METHOD: ABNORMAL
EOSINOPHIL # BLD AUTO: 0.1 K/UL (ref 0–0.5)
EOSINOPHIL NFR BLD: 1.4 % (ref 0–8)
ERYTHROCYTE [DISTWIDTH] IN BLOOD BY AUTOMATED COUNT: 20.4 % (ref 11.5–14.5)
EST. GFR  (NO RACE VARIABLE): >60 ML/MIN/1.73 M^2
GLUCOSE SERPL-MCNC: 73 MG/DL (ref 70–110)
HCT VFR BLD AUTO: 40.5 % (ref 37–48.5)
HGB BLD-MCNC: 12.2 G/DL (ref 12–16)
IMM GRANULOCYTES # BLD AUTO: 0.01 K/UL (ref 0–0.04)
IMM GRANULOCYTES NFR BLD AUTO: 0.2 % (ref 0–0.5)
LIPASE SERPL-CCNC: 18 U/L (ref 4–60)
LYMPHOCYTES # BLD AUTO: 0.6 K/UL (ref 1–4.8)
LYMPHOCYTES NFR BLD: 12.9 % (ref 18–48)
MAGNESIUM SERPL-MCNC: 2 MG/DL (ref 1.6–2.6)
MCH RBC QN AUTO: 31.4 PG (ref 27–31)
MCHC RBC AUTO-ENTMCNC: 30.1 G/DL (ref 32–36)
MCV RBC AUTO: 104 FL (ref 82–98)
MONOCYTES # BLD AUTO: 0.2 K/UL (ref 0.3–1)
MONOCYTES NFR BLD: 4.4 % (ref 4–15)
NEUTROPHILS # BLD AUTO: 3.5 K/UL (ref 1.8–7.7)
NEUTROPHILS NFR BLD: 79.9 % (ref 38–73)
NRBC BLD-RTO: 0 /100 WBC
PHOSPHATE SERPL-MCNC: 2.8 MG/DL (ref 2.7–4.5)
PLATELET # BLD AUTO: 453 K/UL (ref 150–450)
PMV BLD AUTO: 10.2 FL (ref 9.2–12.9)
POTASSIUM SERPL-SCNC: 5.5 MMOL/L (ref 3.5–5.1)
RBC # BLD AUTO: 3.88 M/UL (ref 4–5.4)
SODIUM SERPL-SCNC: 137 MMOL/L (ref 136–145)
TACROLIMUS BLD-MCNC: 9.7 NG/ML (ref 5–15)
WBC # BLD AUTO: 4.34 K/UL (ref 3.9–12.7)

## 2023-01-17 PROCEDURE — 80069 RENAL FUNCTION PANEL: CPT | Performed by: INTERNAL MEDICINE

## 2023-01-17 PROCEDURE — 36415 COLL VENOUS BLD VENIPUNCTURE: CPT | Performed by: INTERNAL MEDICINE

## 2023-01-17 PROCEDURE — 85025 COMPLETE CBC W/AUTO DIFF WBC: CPT | Performed by: INTERNAL MEDICINE

## 2023-01-17 PROCEDURE — 80197 ASSAY OF TACROLIMUS: CPT | Performed by: INTERNAL MEDICINE

## 2023-01-17 PROCEDURE — 82150 ASSAY OF AMYLASE: CPT | Performed by: INTERNAL MEDICINE

## 2023-01-17 PROCEDURE — 83735 ASSAY OF MAGNESIUM: CPT | Performed by: INTERNAL MEDICINE

## 2023-01-17 PROCEDURE — 83690 ASSAY OF LIPASE: CPT | Performed by: INTERNAL MEDICINE

## 2023-01-17 RX ORDER — SODIUM BICARBONATE 650 MG/1
1950 TABLET ORAL 3 TIMES DAILY
Qty: 180 TABLET | Refills: 11 | Status: ON HOLD | OUTPATIENT
Start: 2023-01-17 | End: 2023-01-25 | Stop reason: HOSPADM

## 2023-01-17 NOTE — TELEPHONE ENCOUNTER
Voicemail left for patient as well as message sent to patient via [facility] detailing medication changes. Advised patient to review list on page 100 of transplant book for high potassium foods to avoid. Plan to repeat labs including G6PD next Monday.    ----- Message from Elmira Celis MD sent at 1/17/2023  4:01 PM CST -----  Hyperkalemia (steadily rising) - low K diet. Check G6PD with next lab. Also stop bactrim unless she reports very high K diet that she can cut back.  Acidosis - if taking HCO3 1300 mg TID regularly, increase to 1950 mg TID

## 2023-01-18 RX ORDER — EPINEPHRINE 0.3 MG/.3ML
0.3 INJECTION SUBCUTANEOUS ONCE AS NEEDED
Status: CANCELLED | OUTPATIENT
Start: 2023-01-26

## 2023-01-18 RX ORDER — HEPARIN 100 UNIT/ML
500 SYRINGE INTRAVENOUS
Status: CANCELLED | OUTPATIENT
Start: 2023-01-26

## 2023-01-18 RX ORDER — SODIUM CHLORIDE 0.9 % (FLUSH) 0.9 %
10 SYRINGE (ML) INJECTION
Status: CANCELLED | OUTPATIENT
Start: 2023-01-26

## 2023-01-18 RX ORDER — DIPHENHYDRAMINE HYDROCHLORIDE 50 MG/ML
50 INJECTION INTRAMUSCULAR; INTRAVENOUS ONCE AS NEEDED
Status: CANCELLED | OUTPATIENT
Start: 2023-01-26

## 2023-01-18 RX ORDER — ACETAMINOPHEN 500 MG
500 TABLET ORAL
Status: CANCELLED | OUTPATIENT
Start: 2023-01-26

## 2023-01-18 RX ORDER — DIPHENHYDRAMINE HCL 25 MG
25 CAPSULE ORAL
Status: CANCELLED | OUTPATIENT
Start: 2023-01-26

## 2023-01-18 RX ORDER — DIPHENHYDRAMINE HYDROCHLORIDE 50 MG/ML
25 INJECTION INTRAMUSCULAR; INTRAVENOUS
Status: CANCELLED | OUTPATIENT
Start: 2023-01-26

## 2023-01-20 ENCOUNTER — PATIENT MESSAGE (OUTPATIENT)
Dept: OBSTETRICS AND GYNECOLOGY | Facility: CLINIC | Age: 28
End: 2023-01-20
Payer: MEDICARE

## 2023-01-21 ENCOUNTER — HOSPITAL ENCOUNTER (INPATIENT)
Facility: HOSPITAL | Age: 28
LOS: 4 days | Discharge: HOME | DRG: 698 | End: 2023-01-25
Attending: EMERGENCY MEDICINE | Admitting: INTERNAL MEDICINE
Payer: MEDICARE

## 2023-01-21 DIAGNOSIS — N12 PYELONEPHRITIS: Primary | ICD-10-CM

## 2023-01-21 DIAGNOSIS — R07.9 CHEST PAIN: ICD-10-CM

## 2023-01-21 DIAGNOSIS — F33.41 RECURRENT MAJOR DEPRESSIVE DISORDER, IN PARTIAL REMISSION: ICD-10-CM

## 2023-01-21 DIAGNOSIS — R11.2 NAUSEA AND VOMITING, UNSPECIFIED VOMITING TYPE: ICD-10-CM

## 2023-01-21 DIAGNOSIS — Z94.0 STATUS POST DECEASED-DONOR KIDNEY TRANSPLANTATION: ICD-10-CM

## 2023-01-21 DIAGNOSIS — I50.32 CHRONIC HEART FAILURE WITH PRESERVED EJECTION FRACTION: ICD-10-CM

## 2023-01-21 DIAGNOSIS — E78.5 HYPERLIPIDEMIA, UNSPECIFIED HYPERLIPIDEMIA TYPE: ICD-10-CM

## 2023-01-21 DIAGNOSIS — N18.9 ACUTE KIDNEY INJURY SUPERIMPOSED ON CHRONIC KIDNEY DISEASE: ICD-10-CM

## 2023-01-21 DIAGNOSIS — B96.20 BACTEREMIA DUE TO ESCHERICHIA COLI: ICD-10-CM

## 2023-01-21 DIAGNOSIS — Z91.89 AT RISK FOR OPPORTUNISTIC INFECTIONS: ICD-10-CM

## 2023-01-21 DIAGNOSIS — N10 PYELONEPHRITIS, ACUTE: ICD-10-CM

## 2023-01-21 DIAGNOSIS — N25.81 SECONDARY HYPERPARATHYROIDISM: ICD-10-CM

## 2023-01-21 DIAGNOSIS — R65.20 SEVERE SEPSIS: ICD-10-CM

## 2023-01-21 DIAGNOSIS — I50.30 HEART FAILURE WITH PRESERVED EJECTION FRACTION, UNSPECIFIED HF CHRONICITY: ICD-10-CM

## 2023-01-21 DIAGNOSIS — D63.1 ANEMIA IN ESRD (END-STAGE RENAL DISEASE): ICD-10-CM

## 2023-01-21 DIAGNOSIS — N18.6 ANEMIA IN ESRD (END-STAGE RENAL DISEASE): ICD-10-CM

## 2023-01-21 DIAGNOSIS — N17.9 ACUTE KIDNEY INJURY SUPERIMPOSED ON CHRONIC KIDNEY DISEASE: ICD-10-CM

## 2023-01-21 DIAGNOSIS — Z29.89 PROPHYLACTIC IMMUNOTHERAPY: ICD-10-CM

## 2023-01-21 DIAGNOSIS — A41.9 SEPSIS WITHOUT ACUTE ORGAN DYSFUNCTION, DUE TO UNSPECIFIED ORGANISM: ICD-10-CM

## 2023-01-21 DIAGNOSIS — I95.1 ORTHOSTATIC HYPOTENSION: ICD-10-CM

## 2023-01-21 DIAGNOSIS — A41.9 SEVERE SEPSIS: ICD-10-CM

## 2023-01-21 DIAGNOSIS — A41.51 SEPSIS DUE TO ESCHERICHIA COLI WITHOUT ACUTE ORGAN DYSFUNCTION: ICD-10-CM

## 2023-01-21 DIAGNOSIS — R00.0 TACHYCARDIA: ICD-10-CM

## 2023-01-21 DIAGNOSIS — R78.81 BACTEREMIA DUE TO ESCHERICHIA COLI: ICD-10-CM

## 2023-01-21 DIAGNOSIS — E55.9 VITAMIN D DEFICIENCY: ICD-10-CM

## 2023-01-21 DIAGNOSIS — Z79.60 LONG-TERM USE OF IMMUNOSUPPRESSANT MEDICATION: ICD-10-CM

## 2023-01-21 DIAGNOSIS — I15.0 RENOVASCULAR HYPERTENSION: ICD-10-CM

## 2023-01-21 LAB
ALBUMIN SERPL BCP-MCNC: 4 G/DL (ref 3.5–5.2)
ALP SERPL-CCNC: 138 U/L (ref 55–135)
ALT SERPL W/O P-5'-P-CCNC: 26 U/L (ref 10–44)
ANION GAP SERPL CALC-SCNC: 14 MMOL/L (ref 8–16)
AST SERPL-CCNC: 22 U/L (ref 10–40)
BACTERIA #/AREA URNS AUTO: ABNORMAL /HPF
BASOPHILS # BLD AUTO: 0.07 K/UL (ref 0–0.2)
BASOPHILS NFR BLD: 0.4 % (ref 0–1.9)
BILIRUB SERPL-MCNC: 0.8 MG/DL (ref 0.1–1)
BILIRUB UR QL STRIP: NEGATIVE
BUN SERPL-MCNC: 20 MG/DL (ref 6–20)
CALCIUM SERPL-MCNC: 10.6 MG/DL (ref 8.7–10.5)
CHLORIDE SERPL-SCNC: 103 MMOL/L (ref 95–110)
CLARITY UR REFRACT.AUTO: ABNORMAL
CO2 SERPL-SCNC: 18 MMOL/L (ref 23–29)
COLOR UR AUTO: YELLOW
CREAT SERPL-MCNC: 1.5 MG/DL (ref 0.5–1.4)
DIFFERENTIAL METHOD: ABNORMAL
EOSINOPHIL # BLD AUTO: 0 K/UL (ref 0–0.5)
EOSINOPHIL NFR BLD: 0.1 % (ref 0–8)
ERYTHROCYTE [DISTWIDTH] IN BLOOD BY AUTOMATED COUNT: 20.2 % (ref 11.5–14.5)
EST. GFR  (NO RACE VARIABLE): 48.7 ML/MIN/1.73 M^2
GLUCOSE SERPL-MCNC: 105 MG/DL (ref 70–110)
GLUCOSE UR QL STRIP: NEGATIVE
HCT VFR BLD AUTO: 40 % (ref 37–48.5)
HGB BLD-MCNC: 12.8 G/DL (ref 12–16)
HGB UR QL STRIP: ABNORMAL
HYALINE CASTS UR QL AUTO: 0 /LPF
IMM GRANULOCYTES # BLD AUTO: 0.33 K/UL (ref 0–0.04)
IMM GRANULOCYTES NFR BLD AUTO: 1.7 % (ref 0–0.5)
INFLUENZA A, MOLECULAR: NOT DETECTED
INFLUENZA B, MOLECULAR: NOT DETECTED
KETONES UR QL STRIP: ABNORMAL
LACTATE SERPL-SCNC: 1.1 MMOL/L (ref 0.5–2.2)
LACTATE SERPL-SCNC: 2.2 MMOL/L (ref 0.5–2.2)
LEUKOCYTE ESTERASE UR QL STRIP: ABNORMAL
LIPASE SERPL-CCNC: 14 U/L (ref 4–60)
LYMPHOCYTES # BLD AUTO: 0.4 K/UL (ref 1–4.8)
LYMPHOCYTES NFR BLD: 2 % (ref 18–48)
MAGNESIUM SERPL-MCNC: 1.9 MG/DL (ref 1.6–2.6)
MCH RBC QN AUTO: 32.6 PG (ref 27–31)
MCHC RBC AUTO-ENTMCNC: 32 G/DL (ref 32–36)
MCV RBC AUTO: 102 FL (ref 82–98)
MICROSCOPIC COMMENT: ABNORMAL
MONOCYTES # BLD AUTO: 0.5 K/UL (ref 0.3–1)
MONOCYTES NFR BLD: 2.7 % (ref 4–15)
NEUTROPHILS # BLD AUTO: 17.7 K/UL (ref 1.8–7.7)
NEUTROPHILS NFR BLD: 93.1 % (ref 38–73)
NITRITE UR QL STRIP: NEGATIVE
NRBC BLD-RTO: 0 /100 WBC
PH UR STRIP: 6 [PH] (ref 5–8)
PHOSPHATE SERPL-MCNC: 2.5 MG/DL (ref 2.7–4.5)
PLATELET # BLD AUTO: 342 K/UL (ref 150–450)
PMV BLD AUTO: 10.9 FL (ref 9.2–12.9)
POTASSIUM SERPL-SCNC: 4.4 MMOL/L (ref 3.5–5.1)
PROCALCITONIN SERPL IA-MCNC: 6.03 NG/ML
PROT SERPL-MCNC: 9 G/DL (ref 6–8.4)
PROT UR QL STRIP: ABNORMAL
RBC # BLD AUTO: 3.93 M/UL (ref 4–5.4)
RBC #/AREA URNS AUTO: 14 /HPF (ref 0–4)
RSV AG BY MOLECULAR METHOD: NOT DETECTED
SARS-COV-2 RNA RESP QL NAA+PROBE: NOT DETECTED
SODIUM SERPL-SCNC: 135 MMOL/L (ref 136–145)
SP GR UR STRIP: 1.02 (ref 1–1.03)
SQUAMOUS #/AREA URNS AUTO: 2 /HPF
TACROLIMUS BLD-MCNC: 6.3 NG/ML (ref 5–15)
TROPONIN I SERPL DL<=0.01 NG/ML-MCNC: 0.01 NG/ML (ref 0–0.03)
URN SPEC COLLECT METH UR: ABNORMAL
WBC # BLD AUTO: 18.99 K/UL (ref 3.9–12.7)
WBC #/AREA URNS AUTO: >100 /HPF (ref 0–5)
WBC CLUMPS UR QL AUTO: ABNORMAL

## 2023-01-21 PROCEDURE — 99291 CRITICAL CARE FIRST HOUR: CPT | Mod: CS,,, | Performed by: EMERGENCY MEDICINE

## 2023-01-21 PROCEDURE — 93005 ELECTROCARDIOGRAM TRACING: CPT

## 2023-01-21 PROCEDURE — 84145 PROCALCITONIN (PCT): CPT | Performed by: EMERGENCY MEDICINE

## 2023-01-21 PROCEDURE — 20600001 HC STEP DOWN PRIVATE ROOM

## 2023-01-21 PROCEDURE — 84484 ASSAY OF TROPONIN QUANT: CPT | Performed by: EMERGENCY MEDICINE

## 2023-01-21 PROCEDURE — 81001 URINALYSIS AUTO W/SCOPE: CPT | Performed by: EMERGENCY MEDICINE

## 2023-01-21 PROCEDURE — 96361 HYDRATE IV INFUSION ADD-ON: CPT

## 2023-01-21 PROCEDURE — 93010 ELECTROCARDIOGRAM REPORT: CPT | Mod: ,,, | Performed by: INTERNAL MEDICINE

## 2023-01-21 PROCEDURE — 87088 URINE BACTERIA CULTURE: CPT | Performed by: EMERGENCY MEDICINE

## 2023-01-21 PROCEDURE — 83735 ASSAY OF MAGNESIUM: CPT | Performed by: EMERGENCY MEDICINE

## 2023-01-21 PROCEDURE — 87086 URINE CULTURE/COLONY COUNT: CPT | Performed by: EMERGENCY MEDICINE

## 2023-01-21 PROCEDURE — 93010 EKG 12-LEAD: ICD-10-PCS | Mod: ,,, | Performed by: INTERNAL MEDICINE

## 2023-01-21 PROCEDURE — 25000003 PHARM REV CODE 250: Performed by: INTERNAL MEDICINE

## 2023-01-21 PROCEDURE — 80053 COMPREHEN METABOLIC PANEL: CPT | Performed by: EMERGENCY MEDICINE

## 2023-01-21 PROCEDURE — 99223 1ST HOSP IP/OBS HIGH 75: CPT | Mod: AI,GC,, | Performed by: INTERNAL MEDICINE

## 2023-01-21 PROCEDURE — 25000003 PHARM REV CODE 250: Performed by: STUDENT IN AN ORGANIZED HEALTH CARE EDUCATION/TRAINING PROGRAM

## 2023-01-21 PROCEDURE — 85025 COMPLETE CBC W/AUTO DIFF WBC: CPT | Performed by: EMERGENCY MEDICINE

## 2023-01-21 PROCEDURE — 25000003 PHARM REV CODE 250: Performed by: EMERGENCY MEDICINE

## 2023-01-21 PROCEDURE — 87040 BLOOD CULTURE FOR BACTERIA: CPT | Performed by: EMERGENCY MEDICINE

## 2023-01-21 PROCEDURE — 83605 ASSAY OF LACTIC ACID: CPT | Performed by: EMERGENCY MEDICINE

## 2023-01-21 PROCEDURE — 63600175 PHARM REV CODE 636 W HCPCS: Performed by: EMERGENCY MEDICINE

## 2023-01-21 PROCEDURE — 99285 EMERGENCY DEPT VISIT HI MDM: CPT | Mod: 25

## 2023-01-21 PROCEDURE — 63600175 PHARM REV CODE 636 W HCPCS: Performed by: STUDENT IN AN ORGANIZED HEALTH CARE EDUCATION/TRAINING PROGRAM

## 2023-01-21 PROCEDURE — A4217 STERILE WATER/SALINE, 500 ML: HCPCS | Performed by: STUDENT IN AN ORGANIZED HEALTH CARE EDUCATION/TRAINING PROGRAM

## 2023-01-21 PROCEDURE — 87077 CULTURE AEROBIC IDENTIFY: CPT | Mod: 59 | Performed by: EMERGENCY MEDICINE

## 2023-01-21 PROCEDURE — 99291 PR CRITICAL CARE, E/M 30-74 MINUTES: ICD-10-PCS | Mod: CS,,, | Performed by: EMERGENCY MEDICINE

## 2023-01-21 PROCEDURE — 80197 ASSAY OF TACROLIMUS: CPT | Performed by: EMERGENCY MEDICINE

## 2023-01-21 PROCEDURE — 96365 THER/PROPH/DIAG IV INF INIT: CPT

## 2023-01-21 PROCEDURE — 51702 INSERT TEMP BLADDER CATH: CPT

## 2023-01-21 PROCEDURE — 99223 PR INITIAL HOSPITAL CARE,LEVL III: ICD-10-PCS | Mod: AI,GC,, | Performed by: INTERNAL MEDICINE

## 2023-01-21 PROCEDURE — 83690 ASSAY OF LIPASE: CPT | Performed by: EMERGENCY MEDICINE

## 2023-01-21 PROCEDURE — 63600175 PHARM REV CODE 636 W HCPCS: Performed by: INTERNAL MEDICINE

## 2023-01-21 PROCEDURE — 0241U SARS-COV2 (COVID) WITH FLU/RSV BY PCR: CPT | Performed by: EMERGENCY MEDICINE

## 2023-01-21 PROCEDURE — 84100 ASSAY OF PHOSPHORUS: CPT | Performed by: EMERGENCY MEDICINE

## 2023-01-21 PROCEDURE — 96375 TX/PRO/DX INJ NEW DRUG ADDON: CPT

## 2023-01-21 PROCEDURE — 87186 SC STD MICRODIL/AGAR DIL: CPT | Mod: 59 | Performed by: EMERGENCY MEDICINE

## 2023-01-21 RX ORDER — VALGANCICLOVIR 450 MG/1
450 TABLET, FILM COATED ORAL DAILY
Status: DISCONTINUED | OUTPATIENT
Start: 2023-01-21 | End: 2023-01-21

## 2023-01-21 RX ORDER — HYDROCODONE BITARTRATE AND ACETAMINOPHEN 500; 5 MG/1; MG/1
TABLET ORAL CONTINUOUS
Status: DISCONTINUED | OUTPATIENT
Start: 2023-01-21 | End: 2023-01-21

## 2023-01-21 RX ORDER — POLYETHYLENE GLYCOL 3350 17 G/17G
17 POWDER, FOR SOLUTION ORAL 3 TIMES DAILY PRN
Status: DISCONTINUED | OUTPATIENT
Start: 2023-01-21 | End: 2023-01-25 | Stop reason: HOSPADM

## 2023-01-21 RX ORDER — MORPHINE SULFATE 2 MG/ML
2 INJECTION, SOLUTION INTRAMUSCULAR; INTRAVENOUS EVERY 4 HOURS PRN
Status: DISCONTINUED | OUTPATIENT
Start: 2023-01-21 | End: 2023-01-25 | Stop reason: HOSPADM

## 2023-01-21 RX ORDER — PROMETHAZINE HYDROCHLORIDE 12.5 MG/1
12.5 TABLET ORAL EVERY 6 HOURS PRN
Status: DISCONTINUED | OUTPATIENT
Start: 2023-01-21 | End: 2023-01-21 | Stop reason: SDUPTHER

## 2023-01-21 RX ORDER — ONDANSETRON 8 MG/1
8 TABLET, ORALLY DISINTEGRATING ORAL EVERY 8 HOURS PRN
Status: DISCONTINUED | OUTPATIENT
Start: 2023-01-21 | End: 2023-01-21 | Stop reason: SDUPTHER

## 2023-01-21 RX ORDER — GLUCAGON 1 MG
1 KIT INJECTION
Status: DISCONTINUED | OUTPATIENT
Start: 2023-01-21 | End: 2023-01-25 | Stop reason: HOSPADM

## 2023-01-21 RX ORDER — MYCOPHENOLATE MOFETIL 250 MG/1
500 CAPSULE ORAL 2 TIMES DAILY
Status: DISCONTINUED | OUTPATIENT
Start: 2023-01-21 | End: 2023-01-21

## 2023-01-21 RX ORDER — SODIUM CHLORIDE 0.9 % (FLUSH) 0.9 %
10 SYRINGE (ML) INJECTION EVERY 12 HOURS PRN
Status: DISCONTINUED | OUTPATIENT
Start: 2023-01-21 | End: 2023-01-25 | Stop reason: HOSPADM

## 2023-01-21 RX ORDER — VALGANCICLOVIR 450 MG/1
450 TABLET, FILM COATED ORAL DAILY
Status: DISCONTINUED | OUTPATIENT
Start: 2023-01-22 | End: 2023-01-24

## 2023-01-21 RX ORDER — FLUDROCORTISONE ACETATE 0.1 MG/1
100 TABLET ORAL 2 TIMES DAILY PRN
Status: DISCONTINUED | OUTPATIENT
Start: 2023-01-21 | End: 2023-01-25 | Stop reason: HOSPADM

## 2023-01-21 RX ORDER — HYDROCODONE BITARTRATE AND ACETAMINOPHEN 5; 325 MG/1; MG/1
1 TABLET ORAL EVERY 6 HOURS PRN
Status: DISCONTINUED | OUTPATIENT
Start: 2023-01-21 | End: 2023-01-25 | Stop reason: HOSPADM

## 2023-01-21 RX ORDER — ACETAMINOPHEN 500 MG
1000 TABLET ORAL
Status: COMPLETED | OUTPATIENT
Start: 2023-01-21 | End: 2023-01-21

## 2023-01-21 RX ORDER — TACROLIMUS 1 MG/1
4 CAPSULE ORAL 2 TIMES DAILY
Status: DISCONTINUED | OUTPATIENT
Start: 2023-01-22 | End: 2023-01-21

## 2023-01-21 RX ORDER — SODIUM BICARBONATE 650 MG/1
1950 TABLET ORAL 3 TIMES DAILY
Status: DISCONTINUED | OUTPATIENT
Start: 2023-01-21 | End: 2023-01-22

## 2023-01-21 RX ORDER — IBUPROFEN 200 MG
16 TABLET ORAL
Status: DISCONTINUED | OUTPATIENT
Start: 2023-01-21 | End: 2023-01-25 | Stop reason: HOSPADM

## 2023-01-21 RX ORDER — ERGOCALCIFEROL 1.25 MG/1
50000 CAPSULE ORAL
Status: DISCONTINUED | OUTPATIENT
Start: 2023-01-21 | End: 2023-01-25 | Stop reason: HOSPADM

## 2023-01-21 RX ORDER — VENLAFAXINE HYDROCHLORIDE 37.5 MG/1
37.5 CAPSULE, EXTENDED RELEASE ORAL DAILY
Status: DISCONTINUED | OUTPATIENT
Start: 2023-01-21 | End: 2023-01-25 | Stop reason: HOSPADM

## 2023-01-21 RX ORDER — DIPHENHYDRAMINE HCL 25 MG
25 CAPSULE ORAL EVERY 6 HOURS PRN
Status: DISCONTINUED | OUTPATIENT
Start: 2023-01-21 | End: 2023-01-25 | Stop reason: HOSPADM

## 2023-01-21 RX ORDER — IBUPROFEN 200 MG
24 TABLET ORAL
Status: DISCONTINUED | OUTPATIENT
Start: 2023-01-21 | End: 2023-01-25 | Stop reason: HOSPADM

## 2023-01-21 RX ORDER — ASPIRIN 81 MG/1
81 TABLET ORAL DAILY
Status: DISCONTINUED | OUTPATIENT
Start: 2023-01-21 | End: 2023-01-25 | Stop reason: HOSPADM

## 2023-01-21 RX ORDER — TRAZODONE HYDROCHLORIDE 50 MG/1
50 TABLET ORAL NIGHTLY PRN
Status: DISCONTINUED | OUTPATIENT
Start: 2023-01-21 | End: 2023-01-25 | Stop reason: HOSPADM

## 2023-01-21 RX ORDER — PROMETHAZINE HYDROCHLORIDE 12.5 MG/1
12.5 TABLET ORAL EVERY 6 HOURS PRN
Status: DISCONTINUED | OUTPATIENT
Start: 2023-01-21 | End: 2023-01-22

## 2023-01-21 RX ORDER — PREDNISONE 5 MG/1
5 TABLET ORAL DAILY
Status: DISCONTINUED | OUTPATIENT
Start: 2023-01-21 | End: 2023-01-25 | Stop reason: HOSPADM

## 2023-01-21 RX ORDER — TACROLIMUS 1 MG/1
4 CAPSULE ORAL 2 TIMES DAILY
Status: DISCONTINUED | OUTPATIENT
Start: 2023-01-21 | End: 2023-01-22

## 2023-01-21 RX ORDER — ONDANSETRON 2 MG/ML
4 INJECTION INTRAMUSCULAR; INTRAVENOUS
Status: COMPLETED | OUTPATIENT
Start: 2023-01-21 | End: 2023-01-21

## 2023-01-21 RX ORDER — ATORVASTATIN CALCIUM 20 MG/1
20 TABLET, FILM COATED ORAL NIGHTLY
Status: DISCONTINUED | OUTPATIENT
Start: 2023-01-21 | End: 2023-01-25 | Stop reason: HOSPADM

## 2023-01-21 RX ORDER — LANOLIN ALCOHOL/MO/W.PET/CERES
400 CREAM (GRAM) TOPICAL 2 TIMES DAILY
Status: DISCONTINUED | OUTPATIENT
Start: 2023-01-21 | End: 2023-01-22

## 2023-01-21 RX ORDER — ACETAMINOPHEN 325 MG/1
325 TABLET ORAL EVERY 6 HOURS PRN
Status: DISCONTINUED | OUTPATIENT
Start: 2023-01-21 | End: 2023-01-22

## 2023-01-21 RX ORDER — VALGANCICLOVIR 450 MG/1
900 TABLET, FILM COATED ORAL DAILY
Status: DISCONTINUED | OUTPATIENT
Start: 2023-01-22 | End: 2023-01-21

## 2023-01-21 RX ORDER — ONDANSETRON 4 MG/1
4 TABLET, ORALLY DISINTEGRATING ORAL EVERY 6 HOURS PRN
Status: DISCONTINUED | OUTPATIENT
Start: 2023-01-21 | End: 2023-01-22

## 2023-01-21 RX ORDER — NALOXONE HCL 0.4 MG/ML
0.02 VIAL (ML) INJECTION
Status: DISCONTINUED | OUTPATIENT
Start: 2023-01-21 | End: 2023-01-25 | Stop reason: HOSPADM

## 2023-01-21 RX ADMIN — ONDANSETRON 4 MG: 2 INJECTION INTRAMUSCULAR; INTRAVENOUS at 06:01

## 2023-01-21 RX ADMIN — Medication 400 MG: at 01:01

## 2023-01-21 RX ADMIN — VALGANCICLOVIR 450 MG: 450 TABLET, FILM COATED ORAL at 01:01

## 2023-01-21 RX ADMIN — PIPERACILLIN SODIUM AND TAZOBACTAM SODIUM 4.5 G: 4; .5 INJECTION, POWDER, LYOPHILIZED, FOR SOLUTION INTRAVENOUS at 05:01

## 2023-01-21 RX ADMIN — PIPERACILLIN SODIUM AND TAZOBACTAM SODIUM 4.5 G: 4; .5 INJECTION, POWDER, LYOPHILIZED, FOR SOLUTION INTRAVENOUS at 09:01

## 2023-01-21 RX ADMIN — Medication 400 MG: at 08:01

## 2023-01-21 RX ADMIN — ONDANSETRON 4 MG: 4 TABLET, ORALLY DISINTEGRATING ORAL at 12:01

## 2023-01-21 RX ADMIN — ACETAMINOPHEN 1000 MG: 500 TABLET ORAL at 07:01

## 2023-01-21 RX ADMIN — ONDANSETRON 4 MG: 4 TABLET, ORALLY DISINTEGRATING ORAL at 09:01

## 2023-01-21 RX ADMIN — SODIUM CHLORIDE 1000 ML: 9 INJECTION, SOLUTION INTRAVENOUS at 06:01

## 2023-01-21 RX ADMIN — ATORVASTATIN CALCIUM 20 MG: 20 TABLET, FILM COATED ORAL at 08:01

## 2023-01-21 RX ADMIN — SODIUM BICARBONATE: 84 INJECTION, SOLUTION INTRAVENOUS at 01:01

## 2023-01-21 RX ADMIN — PREDNISONE 5 MG: 5 TABLET ORAL at 01:01

## 2023-01-21 RX ADMIN — HYDROCODONE BITARTRATE AND ACETAMINOPHEN 1 TABLET: 5; 325 TABLET ORAL at 01:01

## 2023-01-21 RX ADMIN — DIBASIC SODIUM PHOSPHATE, MONOBASIC POTASSIUM PHOSPHATE AND MONOBASIC SODIUM PHOSPHATE 1 TABLET: 852; 155; 130 TABLET ORAL at 08:01

## 2023-01-21 RX ADMIN — SODIUM BICARBONATE 1950 MG: 650 TABLET ORAL at 04:01

## 2023-01-21 RX ADMIN — TACROLIMUS 4 MG: 1 CAPSULE ORAL at 05:01

## 2023-01-21 RX ADMIN — SODIUM CHLORIDE 1000 ML: 9 INJECTION, SOLUTION INTRAVENOUS at 09:01

## 2023-01-21 RX ADMIN — DIBASIC SODIUM PHOSPHATE, MONOBASIC POTASSIUM PHOSPHATE AND MONOBASIC SODIUM PHOSPHATE 1 TABLET: 852; 155; 130 TABLET ORAL at 01:01

## 2023-01-21 RX ADMIN — ACETAMINOPHEN 325 MG: 325 TABLET ORAL at 09:01

## 2023-01-21 RX ADMIN — VANCOMYCIN HYDROCHLORIDE 1250 MG: 1.25 INJECTION, POWDER, LYOPHILIZED, FOR SOLUTION INTRAVENOUS at 12:01

## 2023-01-21 NOTE — HPI
Mrs. Read is a 27 year old female with history of KP 11/2022 who presented to [facility] [place] [place] ED for N/V/F. She reports not feeling well with chills on Thursday. Her chills improved with tylenol. Friday morning she was on the way to her orthodontist, she had nausea then vomited. She went home took her BP, noticed fever, had more nausea and vomiting then came to our ED.  Denies syncope, chest pain, difficulty breathing, abdominal pain, changes in bowel movements or urination, hematuria, hematochezia, melena  In ED hypotensive with T103. WBC elevated, sCr elevated, UA consistent with infection. Blood and urine cultures obtained. Given pip-tazo and KTS consulted for admission.

## 2023-01-21 NOTE — ED PROVIDER NOTES
Encounter Date: 1/21/2023       History     Chief Complaint   Patient presents with    Emesis     N/V since yesterday. Fever 102.0 at home. Hx of kidney and pancreas transplant. Hypotensive 80s and tachy 140s in triage     27-year-old female with a history of kidney and pancreas transplant 3 months ago presents with fever, nausea, and vomiting.  No diarrhea.  Symptoms started on Thursday.  She has been able to keep her medications down.  She denies cough, shortness of breath, chest pain, abdominal pain, or dysuria.  She says her transplanted organs are functioning properly.  She is making urine and not doing hemodialysis.  The patients available PMH, PSH, Social History, medications, allergies, and triage vital signs were reviewed just prior to their medical evaluation.       Review of patient's allergies indicates:  No Known Allergies  Past Medical History:   Diagnosis Date    Acute cystitis without hematuria 11/25/2022    Acute kidney injury superimposed on chronic kidney disease 4/7/2021    Anemia     Bilious vomiting with nausea 12/12/2022    Chronic hypertension with exacerbation during pregnancy in second trimester 11/6/2020    Current regimen (11/6/20):  - Carvedilol 12.5 mg BID - Nifedipine 30 mg daily Baseline CKD + proteinuria    Chronic kidney disease     Depression     Diabetes mellitus     Diabetic retinopathy     Diarrhea     Encounter for blood transfusion     End stage renal failure on dialysis     Fever 12/1/2022    Fever of undetermined origin 12/12/2022    Gastroparesis     Glaucoma     Hepatomegaly 4/29/2021    History of diabetes mellitus, type I 12/20/2022    Hx of psychiatric care     Hyperlipidemia     Hypertension     Nausea and vomiting 12/12/2022    Nephrotic syndrome     Nephrotic syndrome 3/4/2021    Nonspecific reaction to tuberculin skin test without active tuberculosis 10/1/2021    Palpitations     Poor fetal growth affecting management of mother in second trimester 10/15/2020     Pyelonephritis, acute 11/26/2022    Restrictive lung disease     Retinal detachment     Sepsis 12/1/2022    Sepsis 11/25/2022    Severe pre-eclampsia in second trimester 11/6/2020    SIRS (systemic inflammatory response syndrome) 12/6/2022    Type 1 diabetes mellitus with end-stage renal disease (ESRD) 2/24/2015    Followed by Dr. Mayo.  Last A1C was 13  Currently on lantus 20 units qAM and humolog 10 units with meals  - a fetal echocardiogram around 22-24 weeks - needs eye exam, podiatry exam  - needs EKG, maternal echo and fu with cardiology  - ASA 81mg, folic acid 4mg PO daily - hemoglobin A1c every 4-6 weeks -24 hour urine for protein and creatinine clearance should be performed. Patient will also need a     Past Surgical History:   Procedure Laterality Date    AV FISTULA PLACEMENT Left 04/07/2021    Procedure: CREATION, AV FISTULA;  Surgeon: Roberto Ryan MD;  Location: [facility] OR [address] [place] [address];  Service: Peripheral Vascular;  Laterality: Left;    COLONOSCOPY N/A 03/16/2022    Procedure: COLONOSCOPY;  Surgeon: Tavo Kwok MD;  Location: [facility] ([address] [place] [address]);  Service: Endoscopy;  Laterality: N/A;  Questionable history of delayed gastric emptying longstanding diabetes now on eating pre transplant workup for history of nausea vomiting which seems to have improved with dialysis also chronic diarrhea and history of anemia pre transplant workup for kidney transplant. 3    CYSTOSCOPY      ESOPHAGOGASTRODUODENOSCOPY N/A 03/16/2022    Procedure: EGD (ESOPHAGOGASTRODUODENOSCOPY);  Surgeon: Tavo Kwok MD;  Location: [facility] SEGUN (4TH FLR);  Service: Endoscopy;  Laterality: N/A;  Questionable history of delayed gastric emptying longstanding diabetes now on eating pre transplant workup for history of nausea vomiting which seems to have improved with dialysis also chronic diarrhea and history of anemia pre transplant workup for    FISTULOGRAM N/A 08/11/2021    Procedure: Fistulogram;  Surgeon: Roberto FUNEZ  MD [place];  Location: [facility] CATH LAB;  Service: Cardiology;  Laterality: N/A;    KIDNEY TRANSPLANT Right 11/02/2022    Procedure: TRANSPLANT, KIDNEY;  Surgeon: Kumar Rodriges Jr., MD;  Location: [facility] OR 2ND FLR;  Service: Transplant;  Laterality: Right;    LASER PHOTOCOAGULATION OF RETINA Right 05/31/2022    Procedure: PHOTOCOAGULATION, RETINA, USING LASER;  Surgeon: Maximilian Montaño MD;  Location: [facility] OR 1ST FLR;  Service: Ophthalmology;  Laterality: Right;    PERCUTANEOUS TRANSLUMINAL ANGIOPLASTY OF ARTERIOVENOUS FISTULA N/A 08/11/2021    Procedure: PTA, AV FISTULA;  Surgeon: Roberto Ryan MD;  Location: [facility] CATH LAB;  Service: Cardiology;  Laterality: N/A;    REMOVAL IMPLANT, POSTERIOR SEGMENT, INTRAOCULAR Right 02/01/2022    Procedure: REMOVAL IMPLANT, POSTERIOR SEGMENT, INTRAOCULAR;  Surgeon: Maximilian Montaño MD;  Location: [facility] OR 1ST FLR;  Service: Ophthalmology;  Laterality: Right;    REPAIR OF RETINAL DETACHMENT WITH VITRECTOMY Right 01/25/2022    Procedure: REPAIR, RETINAL DETACHMENT, WITH VITRECTOMY, MEMBRANE PEEL, LASER, INJECTION OF GAS VS OIL;  Surgeon: Maximilian Montaño MD;  Location: [facility] OR 1ST FLR;  Service: Ophthalmology;  Laterality: Right;    REVISION OF ARTERIOVENOUS FISTULA Left 12/10/2021    Procedure: REVISION, AV FISTULA with BVT;  Surgeon: Roberto Ryan MD;  Location: [facility] OR 2ND FLR;  Service: Peripheral Vascular;  Laterality: Left;    TRANSPLANTATION OF PANCREAS N/A 11/02/2022    Procedure: TRANSPLANT, PANCREAS;  Surgeon: Kumar Rodriges Jr., MD;  Location: [facility] OR 2ND FLR;  Service: Transplant;  Laterality: N/A;    VITRECTOMY BY PARS PLANA APPROACH Right 02/01/2022    Procedure: VITRECTOMY, PARS PLANA APPROACH;  Surgeon: Maximilian Montaño MD;  Location: [facility] OR 1ST FLR;  Service: Ophthalmology;  Laterality: Right;    VITRECTOMY BY PARS PLANA APPROACH Right 05/31/2022    Procedure: VITRECTOMY, PARS PLANA APPROACH;  Surgeon: Maximilian Montaño MD;   Location: [facility] OR [address] [place] [address];  Service: Ophthalmology;  Laterality: Right;     Family History   Problem Relation Age of Onset    Hypertension Mother     Heart disease Father     Diabetes Brother     Celiac disease Neg Hx     Cirrhosis Neg Hx     Colon cancer Neg Hx     Colon polyps Neg Hx     Crohn's disease Neg Hx     Inflammatory bowel disease Neg Hx     Liver cancer Neg Hx     Liver disease Neg Hx     Rectal cancer Neg Hx     Stomach cancer Neg Hx     Ulcerative colitis Neg Hx     Esophageal cancer Neg Hx     Hemochromatosis Neg Hx     Pancreatic cancer Neg Hx     Kidney cancer Neg Hx     Bladder Cancer Neg Hx     Uterine cancer Neg Hx     Ovarian cancer Neg Hx      Social History     Tobacco Use    Smoking status: Never    Smokeless tobacco: Never   Substance Use Topics    Alcohol use: No    Drug use: No     Review of Systems   Constitutional:  Negative for fever.   Respiratory:  Negative for cough and shortness of breath.    Cardiovascular:  Negative for chest pain.   Gastrointestinal:  Positive for nausea and vomiting. Negative for abdominal pain and diarrhea.   Genitourinary:  Negative for dysuria.   Allergic/Immunologic: Positive for immunocompromised state.     Physical Exam     Initial Vitals   BP Pulse Resp Temp SpO2   01/21/23 0626 01/21/23 0626 01/21/23 0626 01/21/23 0626 01/21/23 0635   (!) 89/61 (!) 143 (!) 22 (!) 102.6 °F (39.2 °C) 99 %      MAP       --                Physical Exam    Nursing note and vitals reviewed.  Constitutional: She appears well-developed and well-nourished. She is not diaphoretic. No distress.   HENT:   Head: Normocephalic and atraumatic.   Nose: Nose normal.   Eyes: Conjunctivae are normal. Right eye exhibits no discharge. Left eye exhibits no discharge.   Neck: Neck supple.   Normal range of motion.  Cardiovascular:  Regular rhythm and normal heart sounds.     Exam reveals no gallop and no friction rub.       No murmur heard.  tachycardia   Pulmonary/Chest: Breath sounds  normal. No respiratory distress. She has no wheezes. She has no rhonchi. She has no rales.   Abdominal: Abdomen is soft. She exhibits no distension. There is no abdominal tenderness.   Midline incision well healed There is no rebound and no guarding.   Musculoskeletal:         General: No tenderness or edema. Normal range of motion.      Cervical back: Normal range of motion and neck supple.     Neurological: She is alert and oriented to person, place, and time. She has normal strength. GCS score is 15. GCS eye subscore is 4. GCS verbal subscore is 5. GCS motor subscore is 6.   Skin: Skin is warm and dry. No rash noted. No erythema.   Psychiatric: She has a normal mood and affect. Her behavior is normal. Judgment and thought content normal.       ED Course   Procedures  Labs Reviewed   CBC W/ AUTO DIFFERENTIAL - Abnormal; Notable for the following components:       Result Value    WBC 18.99 (*)     RBC 3.93 (*)      (*)     MCH 32.6 (*)     RDW 20.2 (*)     Immature Granulocytes 1.7 (*)     Gran # (ANC) 17.7 (*)     Immature Grans (Abs) 0.33 (*)     Lymph # 0.4 (*)     Gran % 93.1 (*)     Lymph % 2.0 (*)     Mono % 2.7 (*)     All other components within normal limits   COMPREHENSIVE METABOLIC PANEL - Abnormal; Notable for the following components:    Sodium 135 (*)     CO2 18 (*)     Creatinine 1.5 (*)     Calcium 10.6 (*)     Total Protein 9.0 (*)     Alkaline Phosphatase 138 (*)     eGFR 48.7 (*)     All other components within normal limits   URINALYSIS, REFLEX TO URINE CULTURE - Abnormal; Notable for the following components:    Appearance, UA Hazy (*)     Protein, UA 2+ (*)     Ketones, UA 1+ (*)     Occult Blood UA 1+ (*)     Leukocytes, UA 3+ (*)     All other components within normal limits    Narrative:     Specimen Source->Urine   PHOSPHORUS - Abnormal; Notable for the following components:    Phosphorus 2.5 (*)     All other components within normal limits   PROCALCITONIN - Abnormal; Notable for  the following components:    Procalcitonin 6.03 (*)     All other components within normal limits   URINALYSIS MICROSCOPIC - Abnormal; Notable for the following components:    RBC, UA 14 (*)     WBC, UA >100 (*)     WBC Clumps, UA Occasional (*)     Bacteria Moderate (*)     All other components within normal limits    Narrative:     Specimen Source->Urine   CULTURE, BLOOD   CULTURE, BLOOD   CULTURE, URINE   LACTIC ACID, PLASMA   MAGNESIUM   LIPASE   TROPONIN I   TACROLIMUS LEVEL   SARS-COV2 (COVID) WITH FLU/RSV BY PCR        ECG Results              EKG 12-lead (Final result)  Result time 01/21/23 07:47:46      Final result by Interface, Lab In [facility] (01/21/23 07:47:46)                   Narrative:    Test Reason : R00.0,    Vent. Rate : 143 BPM     Atrial Rate : 143 BPM     P-R Int : 130 ms          QRS Dur : 076 ms      QT Int : 272 ms       P-R-T Axes : 080 076 083 degrees     QTc Int : 419 ms    Sinus tachycardia  Biatrial enlargement  Abnormal ECG  When compared with ECG of 15-DEC-2022 13:36,  Questionable change in The axis  Confirmed by Sohail SANZ MD ([id number]) on 1/21/2023 7:47:37 AM    Referred By: [email or web address]   SELF           Confirmed By:Sohail SANZ MD                                  Imaging Results              X-Ray Chest AP Portable (Final result)  Result time 01/21/23 07:49:42      Final result by Randy Sullivan MD (01/21/23 07:49:42)                   Impression:      No acute cardiopulmonary abnormality.    Electronically signed by resident: Demarco King  Date:    01/21/2023  Time:    07:43    Electronically signed by: Randy Sullivan MD  Date:    01/21/2023  Time:    07:49               Narrative:    EXAMINATION:  XR CHEST AP PORTABLE    CLINICAL HISTORY:  Sepsis;    TECHNIQUE:  Single frontal view of the chest was performed.    COMPARISON:  Chest radiograph 11/25/2022, 11/03/2022.  CTA chest 09/14/2022.    FINDINGS:  Monitoring leads.    Lungs are symmetrically expanded.  Minimal asymmetric  interstitial attenuation overlying the left lung likely exaggerated by breast attenuation and portable technique.  No focal consolidation.  No pleural effusion or pneumothorax.    Trachea and mediastinal structures are midline.  Cardiomediastinal silhouette is unremarkable.    No acute osseous process.                                       Medications   piperacillin-tazobactam (ZOSYN) 4.5 g in dextrose 5 % in water (D5W) 5 % 100 mL IVPB (MB+) (4.5 g Intravenous New Bag 1/21/23 0900)   sodium chloride 0.9% bolus 1,000 mL 1,000 mL (1,000 mLs Intravenous New Bag 1/21/23 0902)   sodium chloride 0.9% bolus 1,000 mL 1,000 mL (0 mLs Intravenous Stopped 1/21/23 0808)   ondansetron injection 4 mg (4 mg Intravenous Given 1/21/23 0657)   acetaminophen tablet 1,000 mg (1,000 mg Oral Given 1/21/23 0711)     Medical Decision Making:   History:   I obtained history from: someone other than patient.       <> Summary of History: Mother assists HPI  Old Medical Records: I decided to obtain old medical records.  Old Records Summarized: records from clinic visits.       <> Summary of Records: Previous labs  Independently Interpreted Test(s):   I have ordered and independently interpreted EKG Reading(s) - see prior notes  Clinical Tests:   Lab Tests: Ordered and Reviewed  Radiological Study: Ordered and Reviewed  Medical Tests: Ordered and Reviewed  ED Management:  27-year-old female with a history of kidney and pancreas transplant presents with nausea, vomiting, and fever.  Vitals with hypotension, tachycardia, and fever.  Physical exam as above.  Labs with elevated WBC and procal.  UA with UTI.  Suspect pyelo of transplated kidney.  Treated with iv abx and ivf.  Improved.  Will admit to [facility] for further abx.  Did bedside teaching.  All questions answered.  Patient acknowledges understanding.   Other:   I have discussed this case with another health care provider.       <> Summary of the Discussion: [facility], ed eval                         Clinical Impression:   Final diagnoses:  [R00.0] Tachycardia  [R11.2] Nausea and vomiting, unspecified vomiting type  [A41.9] Sepsis without acute organ dysfunction, due to unspecified organism  [N12] Pyelonephritis (Primary)        ED Disposition Condition    Admit Stable          Critical Care:  Date: 01/21/2023  Performed by: Dr. Sarmad Duenas   Authorized by: Dr. Sarmad Duenas  Total critical care time (exclusive of procedural time) : 35 minutes  Critical care was necessary to treat or prevent imminent or life-threatening deterioration of the following conditions:  sepsis, pyelo         Sarmad Duenas MD  01/21/23 0945

## 2023-01-21 NOTE — H&P
[facility] Emergency Dept  Kidney Transplant  H&P      Subjective:     Chief Complaint/Reason for Admission: severe sepsis from pyelonephritis     History of Present Illness:  Mrs. Read is a 27 year old female with history of KP 11/2022 who presented to [facility] ED for N/V/F. She reports not feeling well with chills on Thursday. Her chills improved with tylenol. Friday morning she was on the way to her orthodontist, she had nausea then vomited. She went home took her BP, noticed fever, had more nausea and vomiting then came to our ED.  Denies syncope, chest pain, difficulty breathing, abdominal pain, changes in bowel movements or urination, hematuria, hematochezia, melena  In ED hypotensive with T103. WBC elevated, sCr elevated, UA consistent with infection. Blood and urine cultures obtained. Given pip-tazo and KTS consulted for admission.     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 OR without specific notation. sCr decreased to 1. Readmission December with pyelonephritis from enterococcus. Has very large kidney taking up most of pelvis.    (Not in a hospital admission)      Review of patient's allergies indicates:  No Known Allergies    Past Medical History:   Diagnosis Date    Acute cystitis without hematuria 11/25/2022    Acute kidney injury superimposed on chronic kidney disease 4/7/2021    Anemia     Bilious vomiting with nausea 12/12/2022    Chronic hypertension with exacerbation during pregnancy in second trimester 11/6/2020    Current regimen (11/6/20):  - Carvedilol 12.5 mg BID - Nifedipine 30 mg daily Baseline CKD + proteinuria    Chronic kidney disease     Depression     Diabetes mellitus     Diabetic retinopathy     Diarrhea     Encounter for blood transfusion     End stage renal failure on dialysis     Fever 12/1/2022    Fever of undetermined origin 12/12/2022    Gastroparesis     Glaucoma     Hepatomegaly 4/29/2021    History of diabetes  mellitus, type I 12/20/2022    Hx of psychiatric care     Hyperlipidemia     Hypertension     Nausea and vomiting 12/12/2022    Nephrotic syndrome     Nephrotic syndrome 3/4/2021    Nonspecific reaction to tuberculin skin test without active tuberculosis 10/1/2021    Palpitations     Poor fetal growth affecting management of mother in second trimester 10/15/2020    Pyelonephritis, acute 11/26/2022    Restrictive lung disease     Retinal detachment     Sepsis 12/1/2022    Sepsis 11/25/2022    Severe pre-eclampsia in second trimester 11/6/2020    SIRS (systemic inflammatory response syndrome) 12/6/2022    Type 1 diabetes mellitus with end-stage renal disease (ESRD) 2/24/2015    Followed by Dr. Mayo.  Last A1C was 13  Currently on lantus 20 units qAM and humolog 10 units with meals  - a fetal echocardiogram around 22-24 weeks - needs eye exam, podiatry exam  - needs EKG, maternal echo and fu with cardiology  - ASA 81mg, folic acid 4mg PO daily - hemoglobin A1c every 4-6 weeks -24 hour urine for protein and creatinine clearance should be performed. Patient will also need a     Past Surgical History:   Procedure Laterality Date    AV FISTULA PLACEMENT Left 04/07/2021    Procedure: CREATION, AV FISTULA;  Surgeon: Roberto Ryan MD;  Location: [facility] OR 2ND FLR;  Service: Peripheral Vascular;  Laterality: Left;    COLONOSCOPY N/A 03/16/2022    Procedure: COLONOSCOPY;  Surgeon: Tavo Kwok MD;  Location: [facility] (4TH FLR);  Service: Endoscopy;  Laterality: N/A;  Questionable history of delayed gastric emptying longstanding diabetes now on eating pre transplant workup for history of nausea vomiting which seems to have improved with dialysis also chronic diarrhea and history of anemia pre transplant workup for kidney transplant. 3    CYSTOSCOPY      ESOPHAGOGASTRODUODENOSCOPY N/A 03/16/2022    Procedure: EGD (ESOPHAGOGASTRODUODENOSCOPY);  Surgeon: Tavo Kwok MD;  Location: [facility]  (4TH FLR);  Service: Endoscopy;  Laterality: N/A;  Questionable history of delayed gastric emptying longstanding diabetes now on eating pre transplant workup for history of nausea vomiting which seems to have improved with dialysis also chronic diarrhea and history of anemia pre transplant workup for    FISTULOGRAM N/A 08/11/2021    Procedure: Fistulogram;  Surgeon: Roberto Ryan MD;  Location: [facility] CATH LAB;  Service: Cardiology;  Laterality: N/A;    KIDNEY TRANSPLANT Right 11/02/2022    Procedure: TRANSPLANT, KIDNEY;  Surgeon: Kumar Rodriges Jr., MD;  Location: [facility] OR 2ND FLR;  Service: Transplant;  Laterality: Right;    LASER PHOTOCOAGULATION OF RETINA Right 05/31/2022    Procedure: PHOTOCOAGULATION, RETINA, USING LASER;  Surgeon: Maximilian Montaño MD;  Location: [facility] OR 1ST FLR;  Service: Ophthalmology;  Laterality: Right;    PERCUTANEOUS TRANSLUMINAL ANGIOPLASTY OF ARTERIOVENOUS FISTULA N/A 08/11/2021    Procedure: PTA, AV FISTULA;  Surgeon: Roberto Ryan MD;  Location: [facility] CATH LAB;  Service: Cardiology;  Laterality: N/A;    REMOVAL IMPLANT, POSTERIOR SEGMENT, INTRAOCULAR Right 02/01/2022    Procedure: REMOVAL IMPLANT, POSTERIOR SEGMENT, INTRAOCULAR;  Surgeon: Maximilian Montaño MD;  Location: [facility] OR 1ST FLR;  Service: Ophthalmology;  Laterality: Right;    REPAIR OF RETINAL DETACHMENT WITH VITRECTOMY Right 01/25/2022    Procedure: REPAIR, RETINAL DETACHMENT, WITH VITRECTOMY, MEMBRANE PEEL, LASER, INJECTION OF GAS VS OIL;  Surgeon: Maximilian Montaño MD;  Location: [facility] OR 1ST FLR;  Service: Ophthalmology;  Laterality: Right;    REVISION OF ARTERIOVENOUS FISTULA Left 12/10/2021    Procedure: REVISION, AV FISTULA with BVT;  Surgeon: Roberto Ryan MD;  Location: [facility] OR 2ND FLR;  Service: Peripheral Vascular;  Laterality: Left;    TRANSPLANTATION OF PANCREAS N/A 11/02/2022    Procedure: TRANSPLANT, PANCREAS;  Surgeon: Kuamr Rodriges Jr., MD;  Location: [facility] OR 2ND FLR;   Service: Transplant;  Laterality: N/A;    VITRECTOMY BY PARS PLANA APPROACH Right 02/01/2022    Procedure: VITRECTOMY, PARS PLANA APPROACH;  Surgeon: Maximilian Montaño MD;  Location: [facility] OR [address] [place] [address];  Service: Ophthalmology;  Laterality: Right;    VITRECTOMY BY PARS PLANA APPROACH Right 05/31/2022    Procedure: VITRECTOMY, PARS PLANA APPROACH;  Surgeon: Maximilian Montaño MD;  Location: [facility] OR [address] [place] [address];  Service: Ophthalmology;  Laterality: Right;     Family History       Problem Relation (Age of Onset)    Diabetes Brother    Heart disease Father    Hypertension Mother          Tobacco Use    Smoking status: Never    Smokeless tobacco: Never   Substance and Sexual Activity    Alcohol use: No    Drug use: No    Sexual activity: Not Currently     Partners: Male     Comment: monogamous        Review of Systems   Constitutional:  Positive for chills and fever.   HENT: Negative.     Eyes: Negative.    Respiratory: Negative.     Cardiovascular: Negative.    Gastrointestinal:  Positive for nausea and vomiting.   Endocrine: Negative.    Genitourinary: Negative.    Musculoskeletal: Negative.    Skin: Negative.    Neurological: Negative.    Psychiatric/Behavioral: Negative.     Objective:     Vital Signs (Most Recent):  Temp: (!) 100.8 °F (38.2 °C) (01/21/23 1026)  Pulse: (!) 125 (01/21/23 1102)  Resp: (!) 22 (01/21/23 1102)  BP: 117/60 (01/21/23 1102)  SpO2: 97 % (01/21/23 1102)       Weight: 61.2 kg (135 lb)  Body mass index is 23.17 kg/m².     Physical Exam  Vitals and nursing note reviewed.   Constitutional:       General: She is not in acute distress.     Appearance: She is ill-appearing.      Comments: Shivering on examination   Eyes:      General: No scleral icterus.  Cardiovascular:      Rate and Rhythm: Normal rate.   Pulmonary:      Effort: Pulmonary effort is normal. No respiratory distress.   Abdominal:      General: There is no distension.      Palpations: Abdomen is soft.      Tenderness: There is  "no abdominal tenderness.      Comments: Midline incision well healed without erythema or hypertrophy   Musculoskeletal:      Right lower leg: No edema.      Left lower leg: No edema.   Skin:     Coloration: Skin is not jaundiced.   Neurological:      Mental Status: She is alert.       Laboratory  Labs within the past 24 hours have been reviewed.    Diagnostic Results:  Chest X-Ray: No results found. However, due to the size of the patient record, not all encounters were searched. Please check Results Review for a complete set of results.    Patient was SARS-CoV-2 /COVID-19 tested with negative results.     Assessment/Plan:     * Severe sepsis  T103, , WBC 18, GEORGE  Most likely secondary to pyelonephritis  UC and BC pending  CT scan ordered  On pip-tazo and vanco  On IV fluids      CMV (cytomegalovirus) infection  Continue valgan      Pyelonephritis, acute  Last pyelo 222 with enterococcus  UA with >100 WBC moderate bacteria  Ordered CT scan  See severe sepsis for details    Long-term use of immunosuppressant medication  At home on tac , /500, pred 5  Continue home tac and pred  MMF on hold for pyelo      Prophylactic immunotherapy  Continue immunosuppression as per problem "Long-term use of immunosuppressant medication"      Status post -donor simultaneous pancreas kidney transplantation  27 ESKD from DM1 on HD   DBDSPK 11/3/22 KDPI 1 CIT 6.5h cPRA 0 DSA 0 induced with thymo. OR without specific notation. sCr decreased to 1 post op  Admit 2022 with hydro and pyelo with enterococcus. Kidney large and takes up much of pelvis  Now BL sCr 0.8-1.1    (HFpEF) heart failure with preserved ejection fraction  EF 55 from 2022  Monitor fluid status      Acute kidney injury superimposed on chronic kidney disease  Most likely from pyelonephritis  See severe sepsis for details      Secondary hyperparathyroidism  On ergo      Recurrent major depressive disorder, in partial remission  Continue home " venlafaxine    Anemia in ESRD (end-stage renal disease)  Monitor       Hyperlipidemia  Atorvastatin while in hospital  Resume rosuvastatin on discharge    Vitamin D deficiency  Continue home radhao      Adam Pettit Jr., MD  Kidney Transplant  [facility]

## 2023-01-21 NOTE — ASSESSMENT & PLAN NOTE
27 ESKD from DM1 on HD   DBDSPK 11/3/22 KDPI 1 CIT 6.5h cPRA 0 DSA 0 induced with thymo. OR without specific notation. sCr decreased to 1 post op  Admit 12/2022 with hydro and pyelo with enterococcus. Kidney large and takes up much of pelvis  Now BL sCr 0.8-1.1

## 2023-01-21 NOTE — ED TRIAGE NOTES
Rec'd via pov from triage. Pt c/o n/v for 2 days, with fever of 102. Recent hx of pancreas and kidney transplant in Nov '22. Pt a/o x4. MD at bedside.    Past Medical History:   Diagnosis Date    Acute cystitis without hematuria 11/25/2022    Acute kidney injury superimposed on chronic kidney disease 4/7/2021    Anemia     Bilious vomiting with nausea 12/12/2022    Chronic hypertension with exacerbation during pregnancy in second trimester 11/6/2020    Current regimen (11/6/20):  - Carvedilol 12.5 mg BID - Nifedipine 30 mg daily Baseline CKD + proteinuria    Chronic kidney disease     Depression     Diabetes mellitus     Diabetic retinopathy     Diarrhea     Encounter for blood transfusion     End stage renal failure on dialysis     Fever 12/1/2022    Fever of undetermined origin 12/12/2022    Gastroparesis     Glaucoma     Hepatomegaly 4/29/2021    History of diabetes mellitus, type I 12/20/2022    Hx of psychiatric care     Hyperlipidemia     Hypertension     Nausea and vomiting 12/12/2022    Nephrotic syndrome     Nephrotic syndrome 3/4/2021    Nonspecific reaction to tuberculin skin test without active tuberculosis 10/1/2021    Palpitations     Poor fetal growth affecting management of mother in second trimester 10/15/2020    Pyelonephritis, acute 11/26/2022    Restrictive lung disease     Retinal detachment     Sepsis 12/1/2022    Sepsis 11/25/2022    Severe pre-eclampsia in second trimester 11/6/2020    SIRS (systemic inflammatory response syndrome) 12/6/2022    Type 1 diabetes mellitus with end-stage renal disease (ESRD) 2/24/2015    Followed by Dr. Mayo.  Last A1C was 13  Currently on lantus 20 units qAM and humolog 10 units with meals  - a fetal echocardiogram around 22-24 weeks - needs eye exam, podiatry exam  - needs EKG, maternal echo and fu with cardiology  - ASA 81mg, folic acid 4mg PO daily - hemoglobin A1c every 4-6 weeks -24 hour urine for protein and creatinine clearance should be performed.  Patient will also need a       Past Surgical History:   Procedure Laterality Date    AV FISTULA PLACEMENT Left 04/07/2021    Procedure: CREATION, AV FISTULA;  Surgeon: Roberto Ryan MD;  Location: [facility] OR 2ND FLR;  Service: Peripheral Vascular;  Laterality: Left;    COLONOSCOPY N/A 03/16/2022    Procedure: COLONOSCOPY;  Surgeon: Tavo Kwok MD;  Location: [facility] ENDO (4TH FLR);  Service: Endoscopy;  Laterality: N/A;  Questionable history of delayed gastric emptying longstanding diabetes now on eating pre transplant workup for history of nausea vomiting which seems to have improved with dialysis also chronic diarrhea and history of anemia pre transplant workup for kidney transplant. 3    CYSTOSCOPY      ESOPHAGOGASTRODUODENOSCOPY N/A 03/16/2022    Procedure: EGD (ESOPHAGOGASTRODUODENOSCOPY);  Surgeon: Tavo Kwok MD;  Location: [facility] ENDO (4TH FLR);  Service: Endoscopy;  Laterality: N/A;  Questionable history of delayed gastric emptying longstanding diabetes now on eating pre transplant workup for history of nausea vomiting which seems to have improved with dialysis also chronic diarrhea and history of anemia pre transplant workup for    FISTULOGRAM N/A 08/11/2021    Procedure: Fistulogram;  Surgeon: Roberto Ryan MD;  Location: [facility] CATH LAB;  Service: Cardiology;  Laterality: N/A;    KIDNEY TRANSPLANT Right 11/02/2022    Procedure: TRANSPLANT, KIDNEY;  Surgeon: Kumar Rodriges Jr., MD;  Location: [facility] OR 2ND FLR;  Service: Transplant;  Laterality: Right;    LASER PHOTOCOAGULATION OF RETINA Right 05/31/2022    Procedure: PHOTOCOAGULATION, RETINA, USING LASER;  Surgeon: Maximilian Montaño MD;  Location: [facility] OR 1ST FLR;  Service: Ophthalmology;  Laterality: Right;    PERCUTANEOUS TRANSLUMINAL ANGIOPLASTY OF ARTERIOVENOUS FISTULA N/A 08/11/2021    Procedure: PTA, AV FISTULA;  Surgeon: Roberto Ryan MD;  Location: [facility] CATH LAB;  Service: Cardiology;  Laterality: N/A;    REMOVAL  IMPLANT, POSTERIOR SEGMENT, INTRAOCULAR Right 02/01/2022    Procedure: REMOVAL IMPLANT, POSTERIOR SEGMENT, INTRAOCULAR;  Surgeon: Maximilian Montaño MD;  Location: [facility] OR 1ST FLR;  Service: Ophthalmology;  Laterality: Right;    REPAIR OF RETINAL DETACHMENT WITH VITRECTOMY Right 01/25/2022    Procedure: REPAIR, RETINAL DETACHMENT, WITH VITRECTOMY, MEMBRANE PEEL, LASER, INJECTION OF GAS VS OIL;  Surgeon: Maximilian Montaño MD;  Location: [facility] OR 1ST FLR;  Service: Ophthalmology;  Laterality: Right;    REVISION OF ARTERIOVENOUS FISTULA Left 12/10/2021    Procedure: REVISION, AV FISTULA with BVT;  Surgeon: Roberto Ryan MD;  Location: [facility] OR 2ND FLR;  Service: Peripheral Vascular;  Laterality: Left;    TRANSPLANTATION OF PANCREAS N/A 11/02/2022    Procedure: TRANSPLANT, PANCREAS;  Surgeon: Kumar Rodriges Jr., MD;  Location: [facility] OR 2ND FLR;  Service: Transplant;  Laterality: N/A;    VITRECTOMY BY PARS PLANA APPROACH Right 02/01/2022    Procedure: VITRECTOMY, PARS PLANA APPROACH;  Surgeon: Maximilian Montaño MD;  [facility];  Service: Ophthalmology;  Laterality: Right;    VITRECTOMY BY PARS PLANA APPROACH Right 05/31/2022    Procedure: VITRECTOMY, PARS PLANA APPROACH;  Surgeon: Maximilian Montaño MD;  Location: [facility] OR 1ST FLR;  Service: Ophthalmology;  Laterality: Right;       Family History   Problem Relation Age of Onset    Hypertension Mother     Heart disease Father     Diabetes Brother     Celiac disease Neg Hx     Cirrhosis Neg Hx     Colon cancer Neg Hx     Colon polyps Neg Hx     Crohn's disease Neg Hx     Inflammatory bowel disease Neg Hx     Liver cancer Neg Hx     Liver disease Neg Hx     Rectal cancer Neg Hx     Stomach cancer Neg Hx     Ulcerative colitis Neg Hx     Esophageal cancer Neg Hx     Hemochromatosis Neg Hx     Pancreatic cancer Neg Hx     Kidney cancer Neg Hx     Bladder Cancer Neg Hx     Uterine cancer Neg Hx     Ovarian cancer Neg Hx        Social History      Socioeconomic History    Marital status: Single   Occupational History    Occupation:  at [employer]   Tobacco Use    Smoking status: Never    Smokeless tobacco: Never   Substance and Sexual Activity    Alcohol use: No    Drug use: No    Sexual activity: Not Currently     Partners: Male     Comment: monogamous   Social History Narrative    Caregiver        Single    No kids    Had Miscarriage  At 23 weeks from preeclampsia    Disabled       Current Facility-Administered Medications   Medication Dose Route Frequency Provider Last Rate Last Admin    sodium chloride 0.9% bolus 1,000 mL 1,000 mL  1,000 mL Intravenous ED 1 Time Sarmad Duenas MD         Current Outpatient Medications   Medication Sig Dispense Refill    aspirin (ECOTRIN) 81 MG EC tablet Take 81 mg by mouth once daily.      ergocalciferol (ERGOCALCIFEROL) 50,000 unit Cap Take 1 capsule (50,000 Units total) by mouth every 7 days. 4 capsule 5    fludrocortisone (FLORINEF) 0.1 mg Tab Take 1 tablet (100 mcg total) by mouth 2 (two) times daily as needed (When standing blood pressure less than 90 systolic (top number)). 60 tablet 0    k phos di & mono-sod phos mono (K-PHOS-NEUTRAL) 250 mg Tab Take 1 tablet by mouth 2 (two) times a day. 60 tablet 11    magnesium oxide (MAG-OX) 400 mg (241.3 mg magnesium) tablet Take 1 tablet (400 mg total) by mouth 2 (two) times daily. 60 tablet 11    multivitamin (THERAGRAN) per tablet Take 1 tablet by mouth once daily. 30 tablet 5    mycophenolate (CELLCEPT) 250 mg Cap Take 2 capsules (500 mg total) by mouth 2 (two) times daily. 120 capsule 11    ondansetron (ZOFRAN-ODT) 8 MG TbDL DISSOLVE 1 tablet (8 mg total) by mouth every 8 (eight) hours as needed (nausea). 30 tablet 1    predniSONE (DELTASONE) 5 MG tablet Take 1 tablet (5 mg total) by mouth once daily. 30 tablet 11    promethazine (PHENERGAN) 12.5 MG Tab Take 1 tablet (12.5 mg total) by mouth every 6 (six) hours as needed (nausea). 30 tablet 1    rosuvastatin  (CRESTOR) 10 MG tablet Take 1 tablet (10 mg total) by mouth every evening. 90 tablet 0    sodium bicarbonate 650 MG tablet Take 3 tablets (1,950 mg total) by mouth 3 (three) times daily. 180 tablet 11    tacrolimus (PROGRAF) 1 MG Cap Take 4 capsules (4 mg total) by mouth every morning AND 4 capsules (4 mg total) every evening. 270 capsule 11    valGANciclovir (VALCYTE) 450 mg Tab Take 2 tablets (900 mg total) by mouth once daily. Stop 2/2/2023 60 tablet 1    venlafaxine (EFFEXOR XR) 37.5 MG 24 hr capsule Take 1 capsule (37.5 mg total) by mouth once daily. 30 capsule 11       Review of patient's allergies indicates:  No Known Allergies

## 2023-01-21 NOTE — PROGRESS NOTES
Pharmacokinetic Initial Assessment: IV Vancomycin    Assessment/Plan:    Initiate intravenous vancomycin with loading dose of 1250 mg once with subsequent doses when random concentrations are less than 20 mcg/mL  Desired empiric serum trough concentration is 10 to 20 mcg/mL  Draw vancomycin random level on 1/22/23 at 0400.  Pharmacy will continue to follow and monitor vancomycin.      Please contact pharmacy at extension [phone] with any questions regarding this assessment.     Thank you for the consult,   Sara Mcknight       Patient brief summary:  Isabela Read is a 27 y.o. female initiated on antimicrobial therapy with IV Vancomycin for treatment of suspected bacteremia    Drug Allergies:   Review of patient's allergies indicates:  No Known Allergies    Actual Body Weight:   61.2 kg     Renal Function:   Estimated Creatinine Clearance: 48.6 mL/min (A) (based on SCr of 1.5 mg/dL (H)).,     Dialysis Method (if applicable):  N/A    CBC (last 72 hours):  Recent Labs   Lab Result Units 01/21/23  0659   WBC K/uL 18.99*   Hemoglobin g/dL 12.8   Hematocrit % 40.0   Platelets K/uL 342   Gran % % 93.1*   Lymph % % 2.0*   Mono % % 2.7*   Eosinophil % % 0.1   Basophil % % 0.4   Differential Method  Automated       Metabolic Panel (last 72 hours):  Recent Labs   Lab Result Units 01/21/23  0659 01/21/23  0849   Sodium mmol/L 135*  --    Potassium mmol/L 4.4  --    Chloride mmol/L 103  --    CO2 mmol/L 18*  --    Glucose mg/dL 105  --    Glucose, UA   --  Negative   BUN mg/dL 20  --    Creatinine mg/dL 1.5*  --    Albumin g/dL 4.0  --    Total Bilirubin mg/dL 0.8  --    Alkaline Phosphatase U/L 138*  --    AST U/L 22  --    ALT U/L 26  --    Magnesium mg/dL 1.9  --    Phosphorus mg/dL 2.5*  --        Drug levels (last 3 results):  No results for input(s): VANCOMYCINRA, VANCORANDOM, VANCOMYCINPE, VANCOPEAK, VANCOMYCINTR, VANCOTROUGH in the last 72 hours.    Microbiologic Results:  Microbiology Results (last 7 days)        Procedure Component Value Units Date/Time    Urine culture [[id number]] Collected: 01/21/23 0849    Order Status: No result Specimen: Urine Updated: 01/21/23 0905    Blood culture x two cultures. Draw prior to antibiotics. [[id number]] Collected: 01/21/23 0650    Order Status: Sent Specimen: Blood from Peripheral, Lower Arm, Right Updated: 01/21/23 0716    Blood culture x two cultures. Draw prior to antibiotics. [[id number]] Collected: 01/21/23 0659    Order Status: Sent Specimen: Blood from Peripheral, Wrist, Right Updated: 01/21/23 0716

## 2023-01-21 NOTE — ASSESSMENT & PLAN NOTE
T103, , WBC 18, GEORGE  Most likely secondary to pyelonephritis  UC and BC pending  CT scan ordered  On pip-tazo and vanco  On IV fluids

## 2023-01-21 NOTE — ASSESSMENT & PLAN NOTE
Last pyelo 12/0222 with enterococcus  UA with >100 WBC moderate bacteria  Ordered CT scan  See severe sepsis for details

## 2023-01-22 LAB
ALBUMIN SERPL BCP-MCNC: 2.7 G/DL (ref 3.5–5.2)
ALP SERPL-CCNC: 103 U/L (ref 55–135)
ALT SERPL W/O P-5'-P-CCNC: 18 U/L (ref 10–44)
AMYLASE SERPL-CCNC: 59 U/L (ref 20–110)
ANION GAP SERPL CALC-SCNC: 10 MMOL/L (ref 8–16)
AST SERPL-CCNC: 15 U/L (ref 10–40)
BASOPHILS # BLD AUTO: 0.02 K/UL (ref 0–0.2)
BASOPHILS NFR BLD: 0.2 % (ref 0–1.9)
BILIRUB SERPL-MCNC: 0.8 MG/DL (ref 0.1–1)
BUN SERPL-MCNC: 22 MG/DL (ref 6–20)
CALCIUM SERPL-MCNC: 8 MG/DL (ref 8.7–10.5)
CHLORIDE SERPL-SCNC: 99 MMOL/L (ref 95–110)
CO2 SERPL-SCNC: 28 MMOL/L (ref 23–29)
CREAT SERPL-MCNC: 1.7 MG/DL (ref 0.5–1.4)
DIFFERENTIAL METHOD: ABNORMAL
EOSINOPHIL # BLD AUTO: 0 K/UL (ref 0–0.5)
EOSINOPHIL NFR BLD: 0.1 % (ref 0–8)
ERYTHROCYTE [DISTWIDTH] IN BLOOD BY AUTOMATED COUNT: 19.4 % (ref 11.5–14.5)
EST. GFR  (NO RACE VARIABLE): 41.9 ML/MIN/1.73 M^2
GLUCOSE SERPL-MCNC: 109 MG/DL (ref 70–110)
HCT VFR BLD AUTO: 34.4 % (ref 37–48.5)
HGB BLD-MCNC: 11.1 G/DL (ref 12–16)
IMM GRANULOCYTES # BLD AUTO: 0.06 K/UL (ref 0–0.04)
IMM GRANULOCYTES NFR BLD AUTO: 0.5 % (ref 0–0.5)
LIPASE SERPL-CCNC: 8 U/L (ref 4–60)
LYMPHOCYTES # BLD AUTO: 0.4 K/UL (ref 1–4.8)
LYMPHOCYTES NFR BLD: 3 % (ref 18–48)
MAGNESIUM SERPL-MCNC: 1.7 MG/DL (ref 1.6–2.6)
MCH RBC QN AUTO: 32.3 PG (ref 27–31)
MCHC RBC AUTO-ENTMCNC: 32.3 G/DL (ref 32–36)
MCV RBC AUTO: 100 FL (ref 82–98)
MONOCYTES # BLD AUTO: 0.5 K/UL (ref 0.3–1)
MONOCYTES NFR BLD: 3.9 % (ref 4–15)
NEUTROPHILS # BLD AUTO: 12.2 K/UL (ref 1.8–7.7)
NEUTROPHILS NFR BLD: 92.3 % (ref 38–73)
NRBC BLD-RTO: 0 /100 WBC
PHOSPHATE SERPL-MCNC: 2.7 MG/DL (ref 2.7–4.5)
PLATELET # BLD AUTO: 278 K/UL (ref 150–450)
PMV BLD AUTO: 11.1 FL (ref 9.2–12.9)
POTASSIUM SERPL-SCNC: 3.2 MMOL/L (ref 3.5–5.1)
PROT SERPL-MCNC: 6.4 G/DL (ref 6–8.4)
RBC # BLD AUTO: 3.44 M/UL (ref 4–5.4)
SODIUM SERPL-SCNC: 137 MMOL/L (ref 136–145)
TACROLIMUS BLD-MCNC: 5.2 NG/ML (ref 5–15)
VANCOMYCIN SERPL-MCNC: 12.4 UG/ML
WBC # BLD AUTO: 13.17 K/UL (ref 3.9–12.7)

## 2023-01-22 PROCEDURE — 63600175 PHARM REV CODE 636 W HCPCS: Performed by: STUDENT IN AN ORGANIZED HEALTH CARE EDUCATION/TRAINING PROGRAM

## 2023-01-22 PROCEDURE — A4217 STERILE WATER/SALINE, 500 ML: HCPCS | Performed by: STUDENT IN AN ORGANIZED HEALTH CARE EDUCATION/TRAINING PROGRAM

## 2023-01-22 PROCEDURE — 25000003 PHARM REV CODE 250: Performed by: STUDENT IN AN ORGANIZED HEALTH CARE EDUCATION/TRAINING PROGRAM

## 2023-01-22 PROCEDURE — 80053 COMPREHEN METABOLIC PANEL: CPT | Performed by: STUDENT IN AN ORGANIZED HEALTH CARE EDUCATION/TRAINING PROGRAM

## 2023-01-22 PROCEDURE — 82150 ASSAY OF AMYLASE: CPT | Performed by: STUDENT IN AN ORGANIZED HEALTH CARE EDUCATION/TRAINING PROGRAM

## 2023-01-22 PROCEDURE — 63600175 PHARM REV CODE 636 W HCPCS: Performed by: PHYSICIAN ASSISTANT

## 2023-01-22 PROCEDURE — 99233 SBSQ HOSP IP/OBS HIGH 50: CPT | Mod: GC,,, | Performed by: INTERNAL MEDICINE

## 2023-01-22 PROCEDURE — 20600001 HC STEP DOWN PRIVATE ROOM

## 2023-01-22 PROCEDURE — 25000003 PHARM REV CODE 250: Performed by: NURSE PRACTITIONER

## 2023-01-22 PROCEDURE — 85025 COMPLETE CBC W/AUTO DIFF WBC: CPT | Performed by: STUDENT IN AN ORGANIZED HEALTH CARE EDUCATION/TRAINING PROGRAM

## 2023-01-22 PROCEDURE — 83690 ASSAY OF LIPASE: CPT | Performed by: STUDENT IN AN ORGANIZED HEALTH CARE EDUCATION/TRAINING PROGRAM

## 2023-01-22 PROCEDURE — 25000003 PHARM REV CODE 250: Performed by: INTERNAL MEDICINE

## 2023-01-22 PROCEDURE — 36415 COLL VENOUS BLD VENIPUNCTURE: CPT | Performed by: INTERNAL MEDICINE

## 2023-01-22 PROCEDURE — 80202 ASSAY OF VANCOMYCIN: CPT | Performed by: INTERNAL MEDICINE

## 2023-01-22 PROCEDURE — 83735 ASSAY OF MAGNESIUM: CPT | Performed by: STUDENT IN AN ORGANIZED HEALTH CARE EDUCATION/TRAINING PROGRAM

## 2023-01-22 PROCEDURE — 84100 ASSAY OF PHOSPHORUS: CPT | Performed by: STUDENT IN AN ORGANIZED HEALTH CARE EDUCATION/TRAINING PROGRAM

## 2023-01-22 PROCEDURE — 80197 ASSAY OF TACROLIMUS: CPT | Performed by: STUDENT IN AN ORGANIZED HEALTH CARE EDUCATION/TRAINING PROGRAM

## 2023-01-22 PROCEDURE — 63600175 PHARM REV CODE 636 W HCPCS: Performed by: INTERNAL MEDICINE

## 2023-01-22 PROCEDURE — 99233 PR SUBSEQUENT HOSPITAL CARE,LEVL III: ICD-10-PCS | Mod: GC,,, | Performed by: INTERNAL MEDICINE

## 2023-01-22 RX ORDER — TACROLIMUS 1 MG/1
5 CAPSULE ORAL 2 TIMES DAILY
Status: DISCONTINUED | OUTPATIENT
Start: 2023-01-22 | End: 2023-01-23

## 2023-01-22 RX ORDER — ONDANSETRON 2 MG/ML
4 INJECTION INTRAMUSCULAR; INTRAVENOUS EVERY 6 HOURS PRN
Status: DISCONTINUED | OUTPATIENT
Start: 2023-01-22 | End: 2023-01-25 | Stop reason: HOSPADM

## 2023-01-22 RX ORDER — SODIUM CHLORIDE 9 MG/ML
INJECTION, SOLUTION INTRAVENOUS CONTINUOUS
Status: ACTIVE | OUTPATIENT
Start: 2023-01-22 | End: 2023-01-23

## 2023-01-22 RX ORDER — HEPARIN SODIUM 5000 [USP'U]/ML
5000 INJECTION, SOLUTION INTRAVENOUS; SUBCUTANEOUS EVERY 8 HOURS
Status: DISCONTINUED | OUTPATIENT
Start: 2023-01-22 | End: 2023-01-25 | Stop reason: HOSPADM

## 2023-01-22 RX ORDER — PROCHLORPERAZINE EDISYLATE 5 MG/ML
5 INJECTION INTRAMUSCULAR; INTRAVENOUS EVERY 6 HOURS PRN
Status: DISCONTINUED | OUTPATIENT
Start: 2023-01-22 | End: 2023-01-25 | Stop reason: HOSPADM

## 2023-01-22 RX ORDER — ACETAMINOPHEN 325 MG/1
650 TABLET ORAL EVERY 6 HOURS PRN
Status: DISCONTINUED | OUTPATIENT
Start: 2023-01-22 | End: 2023-01-25 | Stop reason: HOSPADM

## 2023-01-22 RX ORDER — SODIUM BICARBONATE 650 MG/1
1300 TABLET ORAL 2 TIMES DAILY
Status: DISCONTINUED | OUTPATIENT
Start: 2023-01-22 | End: 2023-01-25 | Stop reason: HOSPADM

## 2023-01-22 RX ORDER — POTASSIUM CHLORIDE 20 MEQ/1
20 TABLET, EXTENDED RELEASE ORAL ONCE
Status: COMPLETED | OUTPATIENT
Start: 2023-01-22 | End: 2023-01-22

## 2023-01-22 RX ADMIN — PIPERACILLIN SODIUM AND TAZOBACTAM SODIUM 4.5 G: 4; .5 INJECTION, POWDER, LYOPHILIZED, FOR SOLUTION INTRAVENOUS at 08:01

## 2023-01-22 RX ADMIN — SODIUM CHLORIDE: 9 INJECTION, SOLUTION INTRAVENOUS at 10:01

## 2023-01-22 RX ADMIN — PROCHLORPERAZINE EDISYLATE 5 MG: 5 INJECTION INTRAMUSCULAR; INTRAVENOUS at 11:01

## 2023-01-22 RX ADMIN — ACETAMINOPHEN 650 MG: 325 TABLET ORAL at 03:01

## 2023-01-22 RX ADMIN — PIPERACILLIN SODIUM AND TAZOBACTAM SODIUM 4.5 G: 4; .5 INJECTION, POWDER, LYOPHILIZED, FOR SOLUTION INTRAVENOUS at 12:01

## 2023-01-22 RX ADMIN — ONDANSETRON 4 MG: 4 TABLET, ORALLY DISINTEGRATING ORAL at 06:01

## 2023-01-22 RX ADMIN — ASPIRIN 81 MG: 81 TABLET, COATED ORAL at 08:01

## 2023-01-22 RX ADMIN — SODIUM BICARBONATE: 84 INJECTION, SOLUTION INTRAVENOUS at 03:01

## 2023-01-22 RX ADMIN — PIPERACILLIN SODIUM AND TAZOBACTAM SODIUM 4.5 G: 4; .5 INJECTION, POWDER, LYOPHILIZED, FOR SOLUTION INTRAVENOUS at 04:01

## 2023-01-22 RX ADMIN — Medication 400 MG: at 09:01

## 2023-01-22 RX ADMIN — SODIUM BICARBONATE 1300 MG: 650 TABLET ORAL at 07:01

## 2023-01-22 RX ADMIN — VENLAFAXINE HYDROCHLORIDE 37.5 MG: 37.5 CAPSULE, EXTENDED RELEASE ORAL at 08:01

## 2023-01-22 RX ADMIN — PREDNISONE 5 MG: 5 TABLET ORAL at 08:01

## 2023-01-22 RX ADMIN — ONDANSETRON 4 MG: 2 INJECTION INTRAMUSCULAR; INTRAVENOUS at 08:01

## 2023-01-22 RX ADMIN — SODIUM BICARBONATE 1950 MG: 650 TABLET ORAL at 08:01

## 2023-01-22 RX ADMIN — VALGANCICLOVIR 450 MG: 450 TABLET, FILM COATED ORAL at 08:01

## 2023-01-22 RX ADMIN — POTASSIUM CHLORIDE 20 MEQ: 1500 TABLET, EXTENDED RELEASE ORAL at 10:01

## 2023-01-22 RX ADMIN — ATORVASTATIN CALCIUM 20 MG: 20 TABLET, FILM COATED ORAL at 07:01

## 2023-01-22 RX ADMIN — DIBASIC SODIUM PHOSPHATE, MONOBASIC POTASSIUM PHOSPHATE AND MONOBASIC SODIUM PHOSPHATE 1 TABLET: 852; 155; 130 TABLET ORAL at 08:01

## 2023-01-22 RX ADMIN — TACROLIMUS 4 MG: 1 CAPSULE ORAL at 08:01

## 2023-01-22 RX ADMIN — THERA TABS 1 TABLET: TAB at 08:01

## 2023-01-22 RX ADMIN — TACROLIMUS 5 MG: 1 CAPSULE ORAL at 06:01

## 2023-01-22 RX ADMIN — HYDROCODONE BITARTRATE AND ACETAMINOPHEN 1 TABLET: 5; 325 TABLET ORAL at 02:01

## 2023-01-22 NOTE — CONSULTS
Therapy with vancomycin is complete and/or consult discontinued by provider.  Pharmacy will sign off, please re-consult as needed.

## 2023-01-22 NOTE — PROGRESS NOTES
[facility] - Transplant Stepdown  Kidney Transplant  Progress Note      Reason for Follow-up: Reassessment of Kidney, Pancreas Transplant - 11/3/2022  (#1) recipient and management of immunosuppression.    ORGAN:  RIGHT KIDNEY   Donor Type:  Donation after Brain Death         Subjective:   History of Present Illness:  Mrs. Read is a 27 year old female with history of KP 11/2022 who [place] [facility] [place]. She reports not feeling well with chills on Thursday. Her chills improved with tylenol. Friday morning she was on the way to her orthodontist, she had nausea then vomited. She went home took her BP, noticed fever, had more nausea and vomiting then came to our ED.  Denies syncope, chest pain, difficulty breathing, abdominal pain, changes in bowel movements or urination, hematuria, hematochezia, melena  In ED hypotensive with T103. WBC elevated, sCr elevated, UA consistent with infection. Blood and urine cultures obtained. Given pip-tazo and KTS consulted for admission.     Ms. Read is a 27 y.o. year old female who is status post Kidney, Pancreas Transplant - 11/3/2022  (#1).    Her maintenance immunosuppression consists of:   Immunosuppressants (From admission, onward)      Start     Stop Route Frequency Ordered    01/21/23 1535  tacrolimus capsule 4 mg         -- Oral 2 times daily 01/21/23 1538            Hospital Course:  No notes on file    Interval History:  Still having fevers, sweating in the room this morning. WBC improving. BC pos. CT with edema and mild hydro of kidney. Kidney US ordered.    Past Medical, Surgical, Family, and Social History:   Unchanged from H&P.    Scheduled Meds:   aspirin  81 mg Oral Daily    atorvastatin  20 mg Oral QHS    ergocalciferol  50,000 Units Oral Q7 Days    heparin (porcine)  5,000 Units Subcutaneous Q8H    multivitamin  1 tablet Oral Daily    piperacillin-tazobactam (ZOSYN) IVPB  4.5 g Intravenous Q8H    predniSONE  5 mg Oral Daily    sodium bicarbonate   1,300 mg Oral BID    tacrolimus  4 mg Oral BID    valGANciclovir  450 mg Oral Daily    venlafaxine  37.5 mg Oral Daily     Continuous Infusions:   sodium chloride 0.9% 50 mL/hr at 01/22/23 1029     PRN Meds:acetaminophen, dextrose 10%, dextrose 10%, diphenhydrAMINE, fludrocortisone, glucagon (human recombinant), glucose, glucose, HYDROcodone-acetaminophen, morphine, naloxone, ondansetron, polyethylene glycol, promethazine, sodium chloride 0.9%, traZODone    Intake/Output - Last 3 Shifts         01/20 0700 01/21 0659 01/21 0700 01/22 0659 01/22 0700 01/23 0659    P.O.  590     I.V. (mL/kg)   432.9 (6.8)    IV Piggyback  2350.1 300    Total Intake(mL/kg)  2940.1 (45.9) 732.9 (11.5)    Urine (mL/kg/hr)  675 (0.4) 635 (1.5)    Stool  0     Total Output  675 635    Net  +2265.1 +97.9           Stool Occurrence  2 x              Review of Systems   Constitutional:  Positive for chills and fever.   HENT: Negative.     Eyes: Negative.    Respiratory: Negative.     Cardiovascular: Negative.    Gastrointestinal:  Positive for nausea and vomiting.   Endocrine: Negative.    Genitourinary: Negative.    Musculoskeletal: Negative.    Skin: Negative.    Neurological: Negative.    Psychiatric/Behavioral: Negative.      Objective:     Vital Signs (Most Recent):  Temp: 99 °F (37.2 °C) (01/22/23 1159)  Pulse: (!) 113 (01/22/23 1200)  Resp: 18 (01/22/23 1159)  BP: (!) 91/54 (01/22/23 1200)  SpO2: 98 % (01/22/23 1200)   Vital Signs (24h Range):  Temp:  [99 °F (37.2 °C)-103 °F (39.4 °C)] 99 °F (37.2 °C)  Pulse:  [100-137] 113  Resp:  [16-19] 18  SpO2:  [93 %-100 %] 98 %  BP: ()/(45-79) 91/54     Weight: 64 kg (141 lb)     Body mass index is 24.2 kg/m².    Physical Exam  Vitals and nursing note reviewed.   Constitutional:       General: She is not in acute distress.  Eyes:      General: No scleral icterus.  Cardiovascular:      Rate and Rhythm: Tachycardia present.   Pulmonary:      Effort: Pulmonary effort is normal. No  respiratory distress.   Abdominal:      General: There is no distension.      Palpations: Abdomen is soft.   Musculoskeletal:      Right lower leg: No edema.      Left lower leg: No edema.   Skin:     Coloration: Skin is not jaundiced.   Neurological:      Mental Status: She is alert.       Laboratory:  Labs within the past 24 hours have been reviewed.    Diagnostic Results:  US - Kidney: Results for orders placed during the hospital encounter of 12/01/22    US Transplant Kidney With Doppler    Narrative  EXAMINATION:  US TRANSPLANT KIDNEY WITH DOPPLER    CLINICAL HISTORY:  ARF and history of hydro;    TECHNIQUE:  Real time gray scale and doppler ultrasound was performed over the patient's renal allograft.    COMPARISON:  CT abdomen pelvis 12/04/2022.    Renal transplant ultrasound 12/04/2022.    FINDINGS:  Renal allograft in the right lower quadrant.  The allograft measures 12.4 cm. Normal perfusion. Mild hydronephrosis, similar to prior.  No ureteral stent.    No fluid collections.  Bladder is partially distended and appears within normal limits.    Vasculature:    Resistive indices ranged from 0.70 to 0.77 (previously 0.75-0.77).    Main renal artery peak systolic velocity: 124 (previously 229)cm/sec with normal waveform.    Renal artery/iliac ratio: 0.69.    The main renal vein is patent.    Impression  Satisfactory doppler evaluation of renal allograft.    Stable mild hydronephrosis.    Electronically signed by resident: Bruno Browning  Date:    12/11/2022  Time:    10:33    Electronically signed by: Sarmad Mejia  Date:    12/11/2022  Time:    11:43    Assessment/Plan:     * Severe sepsis  T103, , WBC 18, GEORGE  Most likely secondary to pyelonephritis  UC and BC with ecoli  CT scan with kidney fat stranding, mild hydro  Kidney US ordered  On pip-tazo; stopped vanco  On IV fluids      CMV (cytomegalovirus) infection  Continue valgan prophylaxis    Pyelonephritis, acute  Last pyelo 12/0222 with  "enterococcus  UA with >100 WBC moderate bacteria  Ordered CT scan  See severe sepsis for details    Long-term use of immunosuppressant medication  At home on tac , /500, pred 5  Continue home tac and pred  MMF on hold for pyelo      Prophylactic immunotherapy  Continue immunosuppression as per problem "Long-term use of immunosuppressant medication"      Status post -donor simultaneous pancreas kidney transplantation  27 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 OR without specific notation. sCr decreased to 1 post op  Admit 2022 with hydro and pyelo with enterococcus. Kidney large and takes up much of pelvis  Now BL sCr 0.8-1.1    (HFpEF) heart failure with preserved ejection fraction  EF 55 from 2022  Monitor fluid status      Acute kidney injury superimposed on chronic kidney disease  Most likely from pyelonephritis  See severe sepsis for details      Secondary hyperparathyroidism  On ergo      Recurrent major depressive disorder, in partial remission  Continue home venlafaxine    Anemia in ESRD (end-stage renal disease)  Monitor       Hyperlipidemia  Atorvastatin while in hospital  Resume rosuvastatin on discharge    Vitamin D deficiency  Continue home ergo            Adam Pettit Jr., MD  Kidney Transplant  [facility] - Transplant Stepdown  "

## 2023-01-22 NOTE — ASSESSMENT & PLAN NOTE
T103, , WBC 18, GEORGE  Most likely secondary to pyelonephritis  UC and BC with ecoli  CT scan with kidney fat stranding, mild hydro  Kidney US ordered  On pip-tazo; stopped vanco  On IV fluids

## 2023-01-22 NOTE — PROGRESS NOTES
"   01/22/23 0210 01/22/23 0230 01/22/23 0300   Vital Signs   Temp (!) 101.2 °F (38.4 °C) (!) 101.6 °F (38.7 °C) (!) 100.5 °F (38.1 °C)   Temp src Oral Oral Oral   Pulse (!) 134 (!) 129 (!) 126   Heart Rate Source  --   --   --       01/22/23 0330 01/22/23 0335 01/22/23 0416   Vital Signs   Temp (!) 100.7 °F (38.2 °C)  --  (!) 101.1 °F (38.4 °C)   Temp src Oral  --  Oral   Pulse (!) 120 (!) 120 (!) 111   Heart Rate Source  --   --  Monitor      01/22/23 0426 01/22/23 0500 01/22/23 0530   Vital Signs   Temp 100 °F (37.8 °C) 100.2 °F (37.9 °C) 100 °F (37.8 °C)   Temp src Oral Oral Oral   Pulse 107 107 102   Heart Rate Source  --   --   --       01/22/23 0615   Vital Signs   Temp 99.1 °F (37.3 °C)   Temp src Oral   Pulse 106   Heart Rate Source  --      Pt. Has no become afebrile after cooling blanket application & Tylenol dose of 650 mg. Pt. Given a break from cooling blanket. Pt. On fall bundle precautions. Pt. Free from falls/injury. Pt. Did report pain this shift & given dose of PRN pain med. Antwan was placed in pt., CDI; w/dc care. Pt. On tele, S. Tach (HR 100s-140s) in correlation w/fevers, Karen J. NP aware. Pt. Remains on continuous sodium bicarb @ 75 mls/hr. Pt. Continues to have loose stools, pending sample to be collected & send to lab for C. Diff rule-out. Pt. Due for a retroperitoneal US to f/u pyelo w/hydro on CT later today 01/22. Pts' mother @ bedside, was more disruptive than supportive of Pts' care. Pts' mother making inappropriate requests/concerning statements to the Primary RN during pts' care such as "can you look at my coban, take this off for me" "I think I might fall" "I did just come from the ED myself because of stress". Primary RN attempted to reinforce that the pt. Is the current focus at the moment & that pts' state of health is the sole & current responsibility of the Primary RN; pts' mother did not show any evidence of learning & needs further reinforcement in this aspect. The POC was " reviewed with both pt. & pts' mother, questions encouraged & answered. No further concerns at this time.

## 2023-01-22 NOTE — PROGRESS NOTES
Notified TODD Leonardo NP that temp. Returned, currently @ 101.2. Reinstating cooling blanket, pt. Given Vicodin for pain & tylenol (modified order of 325 mg to 650 mg of Tylenol) to be given @ 3 am at due time. MYLENE.

## 2023-01-22 NOTE — PROGRESS NOTES
"   01/21/23 2130 01/21/23 2152 01/21/23 2230   Vital Signs   Temp (!) 102.9 °F (39.4 °C) (!) 102.9 °F (39.4 °C) (!) 103 °F (39.4 °C)   Temp src Oral  --  Oral   Pulse (!) 136  --  (!) 137   Heart Rate Source  --   --   --    Resp  --   --   --    SpO2 100 %  --  99 %      01/21/23 2300 01/21/23 2301 01/21/23 2330   Vital Signs   Temp (!) 102.9 °F (39.4 °C)  --  (!) 103 °F (39.4 °C)   Temp src Oral  --  Oral   Pulse (!) 134 (!) 134 (!) 130   Heart Rate Source Monitor  --   --    Resp 19  --   --    SpO2 97 %  --  100 %      01/22/23 0000 01/22/23 0100   Vital Signs   Temp (!) 100.8 °F (38.2 °C) 99.9 °F (37.7 °C)   Temp src Oral Oral   Pulse (!) 121 100   Heart Rate Source  --   --    Resp  --   --    SpO2 100 % 99 %     Pt. Alerted Primary RN of suspected "fever", upon further assessment pt. Was verified to be febrile. Pt. Given Zofran due to nausea & then proceeded to take the Tylenol, followed by a cooling blanket. Pt. Became afebrile. No further concerns at this time.   "

## 2023-01-22 NOTE — SUBJECTIVE & OBJECTIVE
Subjective:   History of Present Illness:  Mrs. Read is a 27 year old female with history of KP 11/2022 who presented to [facility] ED for N/V/F. She reports not feeling well with chills on Thursday. Her chills improved with tylenol. Friday morning she was on the way to her orthodontist, she had nausea then vomited. She went home took her BP, noticed fever, had more nausea and vomiting then came to our ED.  Denies syncope, chest pain, difficulty breathing, abdominal pain, changes in bowel movements or urination, hematuria, hematochezia, melena  In ED hypotensive with T103. WBC elevated, sCr elevated, UA consistent with infection. Blood and urine cultures obtained. Given pip-tazo and KTS consulted for admission.     Ms. Read is a 27 y.o. year old female who is status post Kidney, Pancreas Transplant - 11/3/2022  (#1).    Her maintenance immunosuppression consists of:   Immunosuppressants (From admission, onward)      Start     Stop Route Frequency Ordered    01/21/23 1535  tacrolimus capsule 4 mg         -- Oral 2 times daily 01/21/23 1538            Hospital Course:  No notes on file    Interval History:  Still having fevers, sweating in the room this morning. WBC improving. BC pos. CT with edema and mild hydro of kidney. Kidney US ordered.    Past Medical, Surgical, Family, and Social History:   Unchanged from H&P.    Scheduled Meds:   aspirin  81 mg Oral Daily    atorvastatin  20 mg Oral QHS    ergocalciferol  50,000 Units Oral Q7 Days    heparin (porcine)  5,000 Units Subcutaneous Q8H    multivitamin  1 tablet Oral Daily    piperacillin-tazobactam (ZOSYN) IVPB  4.5 g Intravenous Q8H    predniSONE  5 mg Oral Daily    sodium bicarbonate  1,300 mg Oral BID    tacrolimus  4 mg Oral BID    valGANciclovir  450 mg Oral Daily    venlafaxine  37.5 mg Oral Daily     Continuous Infusions:   sodium chloride 0.9% 50 mL/hr at 01/22/23 1029     PRN Meds:acetaminophen, dextrose 10%, dextrose 10%, diphenhydrAMINE, fludrocortisone,  glucagon (human recombinant), glucose, glucose, HYDROcodone-acetaminophen, morphine, naloxone, ondansetron, polyethylene glycol, promethazine, sodium chloride 0.9%, traZODone    Intake/Output - Last 3 Shifts         01/20 0700 01/21 0659 01/21 0700  01/22 0659 01/22 0700 01/23 0659    P.O.  590     I.V. (mL/kg)   432.9 (6.8)    IV Piggyback  2350.1 300    Total Intake(mL/kg)  2940.1 (45.9) 732.9 (11.5)    Urine (mL/kg/hr)  675 (0.4) 635 (1.5)    Stool  0     Total Output  675 635    Net  +2265.1 +97.9           Stool Occurrence  2 x              Review of Systems   Constitutional:  Positive for chills and fever.   HENT: Negative.     Eyes: Negative.    Respiratory: Negative.     Cardiovascular: Negative.    Gastrointestinal:  Positive for nausea and vomiting.   Endocrine: Negative.    Genitourinary: Negative.    Musculoskeletal: Negative.    Skin: Negative.    Neurological: Negative.    Psychiatric/Behavioral: Negative.      Objective:     Vital Signs (Most Recent):  Temp: 99 °F (37.2 °C) (01/22/23 1159)  Pulse: (!) 113 (01/22/23 1200)  Resp: 18 (01/22/23 1159)  BP: (!) 91/54 (01/22/23 1200)  SpO2: 98 % (01/22/23 1200)   Vital Signs (24h Range):  Temp:  [99 °F (37.2 °C)-103 °F (39.4 °C)] 99 °F (37.2 °C)  Pulse:  [100-137] 113  Resp:  [16-19] 18  SpO2:  [93 %-100 %] 98 %  BP: ()/(45-79) 91/54     Weight: 64 kg (141 lb)     Body mass index is 24.2 kg/m².    Physical Exam  Vitals and nursing note reviewed.   Constitutional:       General: She is not in acute distress.  Eyes:      General: No scleral icterus.  Cardiovascular:      Rate and Rhythm: Tachycardia present.   Pulmonary:      Effort: Pulmonary effort is normal. No respiratory distress.   Abdominal:      General: There is no distension.      Palpations: Abdomen is soft.   Musculoskeletal:      Right lower leg: No edema.      Left lower leg: No edema.   Skin:     Coloration: Skin is not jaundiced.   Neurological:      Mental Status: She is alert.        Laboratory:  Labs within the past 24 hours have been reviewed.    Diagnostic Results:  US - Kidney: Results for orders placed during the hospital encounter of 12/01/22    US Transplant Kidney With Doppler    Narrative  EXAMINATION:  US TRANSPLANT KIDNEY WITH DOPPLER    CLINICAL HISTORY:  ARF and history of hydro;    TECHNIQUE:  Real time gray scale and doppler ultrasound was performed over the patient's renal allograft.    COMPARISON:  CT abdomen pelvis 12/04/2022.    Renal transplant ultrasound 12/04/2022.    FINDINGS:  Renal allograft in the right lower quadrant.  The allograft measures 12.4 cm. Normal perfusion. Mild hydronephrosis, similar to prior.  No ureteral stent.    No fluid collections.  Bladder is partially distended and appears within normal limits.    Vasculature:    Resistive indices ranged from 0.70 to 0.77 (previously 0.75-0.77).    Main renal artery peak systolic velocity: 124 (previously 229)cm/sec with normal waveform.    Renal artery/iliac ratio: 0.69.    The main renal vein is patent.    Impression  Satisfactory doppler evaluation of renal allograft.    Stable mild hydronephrosis.    Electronically signed by resident: Bruno Browning  Date:    12/11/2022  Time:    10:33    Electronically signed by: Sarmad Mejia  Date:    12/11/2022  Time:    11:43

## 2023-01-22 NOTE — PLAN OF CARE
Pt AAOx4. Standby assist. Bicarb drip running. BP lying is 110s/60s; standing is 70s/40s. WBC 18.99. Cr. 1.5.

## 2023-01-22 NOTE — CARE UPDATE
RAPID RESPONSE NURSE PROACTIVE ROUNDING NOTE       Time of Visit: 0830    Admit Date: 2023  LOS: 1  Code Status: Full Code   Date of Visit: 2023  : 1995  Age: 27 y.o.  Sex: female  Race: Black or   Bed: 27605/17508 A:   MRN: [id number]  Was the patient discharged from an ICU this admission? No   Was the patient discharged from a PACU within last 24 hours? No   Did the patient receive conscious sedation/general anesthesia in last 24 hours? No  Was the patient in the ED within the past 24 hours? Yes  Was the patient on NIPPV within the past 24 hours? No   Attending Physician: Melani Mcintyre MD  Primary Service: Transplant,Nephrology   Time spent at the bedside: < 15 min    SITUATION    Notified by charge RN during rounding.  Reason for alert: Tachycardia  Called to evaluate the patient for Dysrythmia    BACKGROUND     Why is the patient in the hospital?: Severe sepsis    Patient has a past medical history of Acute cystitis without hematuria, Acute kidney injury superimposed on chronic kidney disease, Anemia, Bilious vomiting with nausea, Chronic hypertension with exacerbation during pregnancy in second trimester, Chronic kidney disease, Depression, Diabetes mellitus, Diabetic retinopathy, Diarrhea, Encounter for blood transfusion, End stage renal failure on dialysis, Fever, Fever of undetermined origin, Gastroparesis, Glaucoma, Hepatomegaly, History of diabetes mellitus, type I, psychiatric care, Hyperlipidemia, Hypertension, Nausea and vomiting, Nephrotic syndrome, Nephrotic syndrome, Nonspecific reaction to tuberculin skin test without active tuberculosis, Palpitations, Poor fetal growth affecting management of mother in second trimester, Pyelonephritis, acute, Restrictive lung disease, Retinal detachment, Sepsis, Sepsis, Severe pre-eclampsia in second trimester, SIRS (systemic inflammatory response syndrome), and Type 1 diabetes mellitus with end-stage renal disease  (ESRD).    Last Vitals:  Temp: 99.3 °F (37.4 °C) (01/22 1115)  Pulse: 109 (01/22 1111)  Resp: 16 (01/22 0828)  BP: 90/52 (01/22 1125)  SpO2: 99 % (01/22 1015)    24 Hours Vitals Range:  Temp:  [99 °F (37.2 °C)-103.2 °F (39.6 °C)]   Pulse:  [100-145]   Resp:  [16-24]   BP: ()/(45-79)   SpO2:  [96 %-100 %]     Labs:  Recent Labs     01/21/23  0659 01/22/23  0722   WBC 18.99* 13.17*   HGB 12.8 11.1*   HCT 40.0 34.4*    278       Recent Labs     01/21/23  0659 01/22/23  0722   * 137   K 4.4 3.2*    99   CO2 18* 28   CREATININE 1.5* 1.7*    109   PHOS 2.5* 2.7   MG 1.9 1.7        No results for input(s): PH, PCO2, PO2, HCO3, POCSATURATED, BE in the last 72 hours.     ASSESSMENT    Physical Exam  Vitals and nursing note reviewed.   Neurological:      General: No focal deficit present.      Mental Status: She is alert and oriented to person, place, and time.   Psychiatric:         Mood and Affect: Mood normal.         Behavior: Behavior normal.         Thought Content: Thought content normal.         Judgment: Judgment normal.   Patient sitting in bed talking with family when entering room . States feeling better but tired.    INTERVENTIONS    The patient was seen for Cardiac problem. Staff concerns included tachycardia. The following interventions were performed: No additional interventions needed at this time..    RECOMMENDATIONS    -Continue plan of care  -continuous tele monitoring.    PROVIDER ESCALATION    Yes/No  no    Orders received and case discussed with NA.    Disposition: Remain in room 82777.    FOLLOW-UP    Rounding completed with charge ARIELLA Yost. updated on plan of care. Instructed to call the Rapid Response Nurse, Sarmad Franklin RN at [phone] for additional questions or concerns.

## 2023-01-22 NOTE — PLAN OF CARE
Pt AAOx4. Mother at bedside. SBA. Bicarb drip d/c; NS running at 50/hr. Still orthostatic - 110s/70s sitting/lying; 90s/50s standing. WBC trending down to 13.17. Cr trending up 1.7. K down to 3.2. D/c cdif precautions bc stool well formed, just soft. Fever this .1-100.6. currently afebrile. Going for kidney US shortly.

## 2023-01-23 PROBLEM — B96.20 BACTEREMIA DUE TO ESCHERICHIA COLI: Status: ACTIVE | Noted: 2023-01-23

## 2023-01-23 PROBLEM — A41.51 SEPSIS DUE TO ESCHERICHIA COLI: Status: ACTIVE | Noted: 2023-01-23

## 2023-01-23 PROBLEM — R78.81 BACTEREMIA DUE TO ESCHERICHIA COLI: Status: ACTIVE | Noted: 2023-01-23

## 2023-01-23 PROBLEM — B25.9 CMV (CYTOMEGALOVIRUS) INFECTION: Status: RESOLVED | Noted: 2022-12-12 | Resolved: 2023-01-23

## 2023-01-23 LAB
ALBUMIN SERPL BCP-MCNC: 2.7 G/DL (ref 3.5–5.2)
ALP SERPL-CCNC: 116 U/L (ref 55–135)
ALT SERPL W/O P-5'-P-CCNC: 18 U/L (ref 10–44)
AMYLASE SERPL-CCNC: 39 U/L (ref 20–110)
ANION GAP SERPL CALC-SCNC: 12 MMOL/L (ref 8–16)
AST SERPL-CCNC: 17 U/L (ref 10–40)
BACTERIA BLD CULT: ABNORMAL
BACTERIA UR CULT: ABNORMAL
BASOPHILS # BLD AUTO: 0.03 K/UL (ref 0–0.2)
BASOPHILS NFR BLD: 0.3 % (ref 0–1.9)
BILIRUB SERPL-MCNC: 0.7 MG/DL (ref 0.1–1)
BUN SERPL-MCNC: 18 MG/DL (ref 6–20)
CALCIUM SERPL-MCNC: 9.1 MG/DL (ref 8.7–10.5)
CHLORIDE SERPL-SCNC: 107 MMOL/L (ref 95–110)
CO2 SERPL-SCNC: 17 MMOL/L (ref 23–29)
CREAT SERPL-MCNC: 1.6 MG/DL (ref 0.5–1.4)
DIFFERENTIAL METHOD: ABNORMAL
EOSINOPHIL # BLD AUTO: 0 K/UL (ref 0–0.5)
EOSINOPHIL NFR BLD: 0.1 % (ref 0–8)
ERYTHROCYTE [DISTWIDTH] IN BLOOD BY AUTOMATED COUNT: 19.1 % (ref 11.5–14.5)
EST. GFR  (NO RACE VARIABLE): 45.1 ML/MIN/1.73 M^2
GLUCOSE SERPL-MCNC: 91 MG/DL (ref 70–110)
HCT VFR BLD AUTO: 31.1 % (ref 37–48.5)
HGB BLD-MCNC: 9.9 G/DL (ref 12–16)
IMM GRANULOCYTES # BLD AUTO: 0.04 K/UL (ref 0–0.04)
IMM GRANULOCYTES NFR BLD AUTO: 0.4 % (ref 0–0.5)
LIPASE SERPL-CCNC: 6 U/L (ref 4–60)
LYMPHOCYTES # BLD AUTO: 0.3 K/UL (ref 1–4.8)
LYMPHOCYTES NFR BLD: 2.7 % (ref 18–48)
MAGNESIUM SERPL-MCNC: 1.9 MG/DL (ref 1.6–2.6)
MCH RBC QN AUTO: 30.9 PG (ref 27–31)
MCHC RBC AUTO-ENTMCNC: 31.8 G/DL (ref 32–36)
MCV RBC AUTO: 97 FL (ref 82–98)
MONOCYTES # BLD AUTO: 0.9 K/UL (ref 0.3–1)
MONOCYTES NFR BLD: 9.2 % (ref 4–15)
NEUTROPHILS # BLD AUTO: 8.6 K/UL (ref 1.8–7.7)
NEUTROPHILS NFR BLD: 87.3 % (ref 38–73)
NRBC BLD-RTO: 0 /100 WBC
PHOSPHATE SERPL-MCNC: 2.8 MG/DL (ref 2.7–4.5)
PLATELET # BLD AUTO: 271 K/UL (ref 150–450)
PMV BLD AUTO: 11.1 FL (ref 9.2–12.9)
POTASSIUM SERPL-SCNC: 3.4 MMOL/L (ref 3.5–5.1)
PROT SERPL-MCNC: 7 G/DL (ref 6–8.4)
RBC # BLD AUTO: 3.2 M/UL (ref 4–5.4)
SODIUM SERPL-SCNC: 136 MMOL/L (ref 136–145)
TACROLIMUS BLD-MCNC: 3.7 NG/ML (ref 5–15)
WBC # BLD AUTO: 9.83 K/UL (ref 3.9–12.7)

## 2023-01-23 PROCEDURE — 25000003 PHARM REV CODE 250: Performed by: STUDENT IN AN ORGANIZED HEALTH CARE EDUCATION/TRAINING PROGRAM

## 2023-01-23 PROCEDURE — 63600175 PHARM REV CODE 636 W HCPCS: Performed by: PHYSICIAN ASSISTANT

## 2023-01-23 PROCEDURE — 84100 ASSAY OF PHOSPHORUS: CPT | Performed by: STUDENT IN AN ORGANIZED HEALTH CARE EDUCATION/TRAINING PROGRAM

## 2023-01-23 PROCEDURE — 99233 SBSQ HOSP IP/OBS HIGH 50: CPT | Mod: ,,, | Performed by: PHYSICIAN ASSISTANT

## 2023-01-23 PROCEDURE — 82150 ASSAY OF AMYLASE: CPT | Performed by: STUDENT IN AN ORGANIZED HEALTH CARE EDUCATION/TRAINING PROGRAM

## 2023-01-23 PROCEDURE — 20600001 HC STEP DOWN PRIVATE ROOM

## 2023-01-23 PROCEDURE — 36415 COLL VENOUS BLD VENIPUNCTURE: CPT | Performed by: PHYSICIAN ASSISTANT

## 2023-01-23 PROCEDURE — 36415 COLL VENOUS BLD VENIPUNCTURE: CPT | Performed by: STUDENT IN AN ORGANIZED HEALTH CARE EDUCATION/TRAINING PROGRAM

## 2023-01-23 PROCEDURE — 63600175 PHARM REV CODE 636 W HCPCS: Performed by: STUDENT IN AN ORGANIZED HEALTH CARE EDUCATION/TRAINING PROGRAM

## 2023-01-23 PROCEDURE — 99233 SBSQ HOSP IP/OBS HIGH 50: CPT | Mod: ,,, | Performed by: INTERNAL MEDICINE

## 2023-01-23 PROCEDURE — 99233 PR SUBSEQUENT HOSPITAL CARE,LEVL III: ICD-10-PCS | Mod: ,,, | Performed by: INTERNAL MEDICINE

## 2023-01-23 PROCEDURE — 80197 ASSAY OF TACROLIMUS: CPT | Performed by: STUDENT IN AN ORGANIZED HEALTH CARE EDUCATION/TRAINING PROGRAM

## 2023-01-23 PROCEDURE — 63600175 PHARM REV CODE 636 W HCPCS: Performed by: INTERNAL MEDICINE

## 2023-01-23 PROCEDURE — 83690 ASSAY OF LIPASE: CPT | Performed by: STUDENT IN AN ORGANIZED HEALTH CARE EDUCATION/TRAINING PROGRAM

## 2023-01-23 PROCEDURE — 85025 COMPLETE CBC W/AUTO DIFF WBC: CPT | Performed by: INTERNAL MEDICINE

## 2023-01-23 PROCEDURE — 99233 PR SUBSEQUENT HOSPITAL CARE,LEVL III: ICD-10-PCS | Mod: ,,, | Performed by: PHYSICIAN ASSISTANT

## 2023-01-23 PROCEDURE — 87040 BLOOD CULTURE FOR BACTERIA: CPT | Performed by: PHYSICIAN ASSISTANT

## 2023-01-23 PROCEDURE — 36415 COLL VENOUS BLD VENIPUNCTURE: CPT | Performed by: INTERNAL MEDICINE

## 2023-01-23 PROCEDURE — 80053 COMPREHEN METABOLIC PANEL: CPT | Performed by: STUDENT IN AN ORGANIZED HEALTH CARE EDUCATION/TRAINING PROGRAM

## 2023-01-23 PROCEDURE — 25000003 PHARM REV CODE 250: Performed by: INTERNAL MEDICINE

## 2023-01-23 PROCEDURE — 83735 ASSAY OF MAGNESIUM: CPT | Performed by: STUDENT IN AN ORGANIZED HEALTH CARE EDUCATION/TRAINING PROGRAM

## 2023-01-23 PROCEDURE — 25000003 PHARM REV CODE 250: Performed by: NURSE PRACTITIONER

## 2023-01-23 RX ORDER — MUPIROCIN 20 MG/G
OINTMENT TOPICAL 2 TIMES DAILY
Status: DISCONTINUED | OUTPATIENT
Start: 2023-01-23 | End: 2023-01-25 | Stop reason: HOSPADM

## 2023-01-23 RX ORDER — TACROLIMUS 1 MG/1
2 CAPSULE ORAL ONCE
Status: COMPLETED | OUTPATIENT
Start: 2023-01-23 | End: 2023-01-23

## 2023-01-23 RX ORDER — TACROLIMUS 1 MG/1
7 CAPSULE ORAL 2 TIMES DAILY
Status: DISCONTINUED | OUTPATIENT
Start: 2023-01-23 | End: 2023-01-24

## 2023-01-23 RX ADMIN — PIPERACILLIN SODIUM AND TAZOBACTAM SODIUM 4.5 G: 4; .5 INJECTION, POWDER, LYOPHILIZED, FOR SOLUTION INTRAVENOUS at 08:01

## 2023-01-23 RX ADMIN — VENLAFAXINE HYDROCHLORIDE 37.5 MG: 37.5 CAPSULE, EXTENDED RELEASE ORAL at 08:01

## 2023-01-23 RX ADMIN — PREDNISONE 5 MG: 5 TABLET ORAL at 08:01

## 2023-01-23 RX ADMIN — SODIUM BICARBONATE 1300 MG: 650 TABLET ORAL at 08:01

## 2023-01-23 RX ADMIN — ONDANSETRON 4 MG: 2 INJECTION INTRAMUSCULAR; INTRAVENOUS at 10:01

## 2023-01-23 RX ADMIN — TACROLIMUS 5 MG: 1 CAPSULE ORAL at 08:01

## 2023-01-23 RX ADMIN — TACROLIMUS 7 MG: 1 CAPSULE ORAL at 06:01

## 2023-01-23 RX ADMIN — PIPERACILLIN SODIUM AND TAZOBACTAM SODIUM 4.5 G: 4; .5 INJECTION, POWDER, LYOPHILIZED, FOR SOLUTION INTRAVENOUS at 06:01

## 2023-01-23 RX ADMIN — MUPIROCIN: 20 OINTMENT TOPICAL at 08:01

## 2023-01-23 RX ADMIN — ATORVASTATIN CALCIUM 20 MG: 20 TABLET, FILM COATED ORAL at 08:01

## 2023-01-23 RX ADMIN — ACETAMINOPHEN 650 MG: 325 TABLET ORAL at 10:01

## 2023-01-23 RX ADMIN — MUPIROCIN: 20 OINTMENT TOPICAL at 10:01

## 2023-01-23 RX ADMIN — SODIUM CHLORIDE: 9 INJECTION, SOLUTION INTRAVENOUS at 06:01

## 2023-01-23 RX ADMIN — PIPERACILLIN SODIUM AND TAZOBACTAM SODIUM 4.5 G: 4; .5 INJECTION, POWDER, LYOPHILIZED, FOR SOLUTION INTRAVENOUS at 01:01

## 2023-01-23 RX ADMIN — ASPIRIN 81 MG: 81 TABLET, COATED ORAL at 08:01

## 2023-01-23 RX ADMIN — TACROLIMUS 2 MG: 1 CAPSULE ORAL at 01:01

## 2023-01-23 RX ADMIN — VALGANCICLOVIR 450 MG: 450 TABLET, FILM COATED ORAL at 08:01

## 2023-01-23 RX ADMIN — THERA TABS 1 TABLET: TAB at 09:01

## 2023-01-23 NOTE — SUBJECTIVE & OBJECTIVE
Past Medical History:   Diagnosis Date    Acute cystitis without hematuria 11/25/2022    Acute kidney injury superimposed on chronic kidney disease 4/7/2021    Anemia     Bilious vomiting with nausea 12/12/2022    Chronic hypertension with exacerbation during pregnancy in second trimester 11/6/2020    Current regimen (11/6/20):  - Carvedilol 12.5 mg BID - Nifedipine 30 mg daily Baseline CKD + proteinuria    Chronic kidney disease     Depression     Diabetes mellitus     Diabetic retinopathy     Diarrhea     Encounter for blood transfusion     End stage renal failure on dialysis     Fever 12/1/2022    Fever of undetermined origin 12/12/2022    Gastroparesis     Glaucoma     Hepatomegaly 4/29/2021    History of diabetes mellitus, type I 12/20/2022    Hx of psychiatric care     Hyperlipidemia     Hypertension     Nausea and vomiting 12/12/2022    Nephrotic syndrome     Nephrotic syndrome 3/4/2021    Nonspecific reaction to tuberculin skin test without active tuberculosis 10/1/2021    Palpitations     Poor fetal growth affecting management of mother in second trimester 10/15/2020    Pyelonephritis, acute 11/26/2022    Restrictive lung disease     Retinal detachment     Sepsis 12/1/2022    Sepsis 11/25/2022    Severe pre-eclampsia in second trimester 11/6/2020    SIRS (systemic inflammatory response syndrome) 12/6/2022    Type 1 diabetes mellitus with end-stage renal disease (ESRD) 2/24/2015    Followed by Dr. Mayo.  Last A1C was 13  Currently on lantus 20 units qAM and humolog 10 units with meals  - a fetal echocardiogram around 22-24 weeks - needs eye exam, podiatry exam  - needs EKG, maternal echo and fu with cardiology  - ASA 81mg, folic acid 4mg PO daily - hemoglobin A1c every 4-6 weeks -24 hour urine for protein and creatinine clearance should be performed. Patient will also need a       Past Surgical History:   Procedure Laterality Date    AV FISTULA PLACEMENT Left 04/07/2021    Procedure: CREATION, AV FISTULA;   Surgeon: Roberto Ryan MD;  Location: [facility] OR 2ND FLR;  Service: Peripheral Vascular;  Laterality: Left;    COLONOSCOPY N/A 03/16/2022    Procedure: COLONOSCOPY;  Surgeon: Tavo Kwok MD;  Location: [facility] (4TH FLR);  Service: Endoscopy;  Laterality: N/A;  Questionable history of delayed gastric emptying longstanding diabetes now on eating pre transplant workup for history of nausea vomiting which seems to have improved with dialysis also chronic diarrhea and history of anemia pre transplant workup for kidney transplant. 3    CYSTOSCOPY      ESOPHAGOGASTRODUODENOSCOPY N/A 03/16/2022    Procedure: EGD (ESOPHAGOGASTRODUODENOSCOPY);  Surgeon: Tavo Kwok MD;  Location: [facility] (4TH FLR);  Service: Endoscopy;  Laterality: N/A;  Questionable history of delayed gastric emptying longstanding diabetes now on eating pre transplant workup for history of nausea vomiting which seems to have improved with dialysis also chronic diarrhea and history of anemia pre transplant workup for    FISTULOGRAM N/A 08/11/2021    Procedure: Fistulogram;  Surgeon: Roberto Ryan MD;  Location: [facility] CATH LAB;  Service: Cardiology;  Laterality: N/A;    KIDNEY TRANSPLANT Right 11/02/2022    Procedure: TRANSPLANT, KIDNEY;  Surgeon: Kumar Rodriges Jr., MD;  Location: [facility] 2ND FLR;  Service: Transplant;  Laterality: Right;    LASER PHOTOCOAGULATION OF RETINA Right 05/31/2022    Procedure: PHOTOCOAGULATION, RETINA, USING LASER;  Surgeon: Maximilian Montaño MD;  Location: [facility] 1ST FLR;  Service: Ophthalmology;  Laterality: Right;    PERCUTANEOUS TRANSLUMINAL ANGIOPLASTY OF ARTERIOVENOUS FISTULA N/A 08/11/2021    Procedure: PTA, AV FISTULA;  Surgeon: Roberto Ryan MD;  Location: [facility] CATH LAB;  Service: Cardiology;  Laterality: N/A;    REMOVAL IMPLANT, POSTERIOR SEGMENT, INTRAOCULAR Right 02/01/2022    Procedure: REMOVAL IMPLANT, POSTERIOR SEGMENT, INTRAOCULAR;  Surgeon: Maximilian Montaño MD;   [facility] [facility] [place] 1ST F[facility];  Service: Ophthalmology;  Laterality: Right;    REPAIR OF RETINAL DETACHMENT WITH VITRECTOMY Right 01/25/2022    Procedure: REPAIR, RETINAL DETACHMENT, WITH VITRECTOMY, MEMBRANE PEEL, LASER, INJECTION OF GAS VS OIL;  Surgeon: Maximilian Montaño MD;  Location: [facility] [place] 1ST FLR;  Service: Ophthalmology;  Laterality: Right;    REVISION OF ARTERIOVENOUS FISTULA Left 12/10/2021    Procedure: REVISION, AV FISTULA with BVT;  Surgeon: Roberto Ryan MD;  Location: [facility] OR 2ND FLR;  Service: Peripheral Vascular;  Laterality: Left;    TRANSPLANTATION OF PANCREAS N/A 11/02/2022    Procedure: TRANSPLANT, PANCREAS;  Surgeon: Kumar Rodriges Jr., MD;  Location: [facility] OR 2ND FLR;  Service: Transplant;  Laterality: N/A;    VITRECTOMY BY PARS PLANA APPROACH Right 02/01/2022    Procedure: VITRECTOMY, PARS PLANA APPROACH;  Surgeon: Maximilian Montaño MD;  [facility];  Service: Ophthalmology;  Laterality: Right;    VITRECTOMY BY PARS PLANA APPROACH Right 05/31/2022    Procedure: VITRECTOMY, PARS PLANA APPROACH;  Surgeon: Maximilian Montaño MD;  [facility];  Service: Ophthalmology;  Laterality: Right;       Review of patient's allergies indicates:  No Known Allergies    Medications:  Medications Prior to Admission   Medication Sig    aspirin (ECOTRIN) 81 MG EC tablet Take 81 mg by mouth once daily.    ergocalciferol (ERGOCALCIFEROL) 50,000 unit Cap Take 1 capsule (50,000 Units total) by mouth every 7 days.    fludrocortisone (FLORINEF) 0.1 mg Tab Take 1 tablet (100 mcg total) by mouth 2 (two) times daily as needed (When standing blood pressure less than 90 systolic (top number)).    k phos di & mono-sod phos mono (K-PHOS-NEUTRAL) 250 mg Tab Take 1 tablet by mouth 2 (two) times a day.    magnesium oxide (MAG-OX) 400 mg (241.3 mg magnesium) tablet Take 1 tablet (400 mg total) by mouth 2 (two) times daily.    multivitamin (THERAGRAN) per tablet Take 1 tablet by mouth once  daily.    mycophenolate (CELLCEPT) 250 mg Cap Take 2 capsules (500 mg total) by mouth 2 (two) times daily.    ondansetron (ZOFRAN-ODT) 8 MG TbDL DISSOLVE 1 tablet (8 mg total) by mouth every 8 (eight) hours as needed (nausea).    predniSONE (DELTASONE) 5 MG tablet Take 1 tablet (5 mg total) by mouth once daily.    promethazine (PHENERGAN) 12.5 MG Tab Take 1 tablet (12.5 mg total) by mouth every 6 (six) hours as needed (nausea).    rosuvastatin (CRESTOR) 10 MG tablet Take 1 tablet (10 mg total) by mouth every evening.    sodium bicarbonate 650 MG tablet Take 3 tablets (1,950 mg total) by mouth 3 (three) times daily.    tacrolimus (PROGRAF) 1 MG Cap Take 4 capsules (4 mg total) by mouth every morning AND 4 capsules (4 mg total) every evening.    valGANciclovir (VALCYTE) 450 mg Tab Take 2 tablets (900 mg total) by mouth once daily. Stop 2/2/2023    venlafaxine (EFFEXOR XR) 37.5 MG 24 hr capsule Take 1 capsule (37.5 mg total) by mouth once daily.     Antibiotics (From admission, onward)      Start     Stop Route Frequency Ordered    01/23/23 1100  mupirocin 2 % ointment         01/28 0859 Nasl 2 times daily 01/23/23 0949    01/21/23 1645  piperacillin-tazobactam (ZOSYN) 4.5 g in dextrose 5 % in water (D5W) 5 % 100 mL IVPB (MB+)         -- IV Every 8 hours (non-standard times) 01/21/23 1538          Antifungals (From admission, onward)      None          Antivirals (From admission, onward)          Stop Route Frequency     valGANciclovir         -- Oral Daily             Immunization History   Administered Date(s) Administered    COVID-19, MRNA, LN-S, PF (MODERNA FULL 0.5 ML DOSE) 06/01/2021, 06/29/2021    HIB 1995, 1995, 1995, 02/12/1996    HPV Quadrivalent 09/12/2013    Hepatitis B (recombinant) Adjuvanted, 2 dose 06/16/2022    Hepatitis B, Adult 08/19/2021, 09/16/2021, 02/17/2022, 03/17/2022, 04/21/2022    Hepatitis B, Pediatric/Adolescent 1995, 1995, 1995    IPV 1995,  1995, 1995, 10/09/1999    Influenza - Quadrivalent - PF *Preferred* (6 months and older) 02/14/2011, 10/21/2019, 10/11/2022    Influenza - Trivalent - PF (ADULT) 02/14/2011    MMR 02/12/1996, 08/10/1999    Meningococcal Conjugate (MCV4P) 04/24/2009, 09/12/2013    PPD Test 05/20/2021    Pneumococcal Conjugate - 13 Valent 08/02/2021    Pneumococcal Polysaccharide - 23 Valent 01/05/2016, 03/24/2022    Tdap 1995, 1995, 1995, 05/31/1996, 08/10/1999, 04/24/2009, 02/14/2011, 09/12/2013    Varicella 09/12/2013       Family History       Problem Relation (Age of Onset)    Diabetes Brother    Heart disease Father    Hypertension Mother          Social History     Socioeconomic History    Marital status: Single   Occupational History    Occupation:  at [employer]   Tobacco Use    Smoking status: Never    Smokeless tobacco: Never   Substance and Sexual Activity    Alcohol use: No    Drug use: No    Sexual activity: Not Currently     Partners: Male     Comment: monogamous   Social History Narrative    Caregiver        Single    No kids    Had Miscarriage  At 23 weeks from preeclampsia    Disabled     Review of Systems   Constitutional:  Positive for fever. Negative for activity change and appetite change.   HENT:  Negative for congestion, drooling, rhinorrhea and sore throat.    Respiratory:  Negative for cough, chest tightness and shortness of breath.    Cardiovascular:  Negative for chest pain and palpitations.   Gastrointestinal:  Positive for abdominal pain. Negative for abdominal distention, blood in stool, diarrhea, nausea and vomiting.   Genitourinary:  Negative for dysuria, hematuria and urgency.   Musculoskeletal:  Negative for back pain.   Allergic/Immunologic: Positive for immunocompromised state.   Neurological:  Negative for syncope.   Psychiatric/Behavioral:  Negative for confusion and decreased concentration.    Objective:     Vital Signs (Most Recent):  Temp: 98.1 °F (36.7 °C)  (01/23/23 1640)  Pulse: 92 (01/23/23 1640)  Resp: 18 (01/23/23 1640)  BP: 128/75 (01/23/23 1640)  SpO2: 95 % (01/23/23 1640) Vital Signs (24h Range):  Temp:  [98.1 °F (36.7 °C)-100.8 °F (38.2 °C)] 98.1 °F (36.7 °C)  Pulse:  [] 92  Resp:  [16-20] 18  SpO2:  [93 %-98 %] 95 %  BP: (111-195)/() 128/75     Weight: 67.2 kg (148 lb 2.4 oz)  Body mass index is 25.43 kg/m².    Estimated Creatinine Clearance: 49.8 mL/min (A) (based on SCr of 1.6 mg/dL (H)).    Physical Exam  Constitutional:       Appearance: Normal appearance. She is not ill-appearing or toxic-appearing.   HENT:      Head: Normocephalic and atraumatic.      Nose: Nose normal.      Mouth/Throat:      Mouth: Mucous membranes are moist.      Pharynx: Oropharynx is clear.   Eyes:      Extraocular Movements: Extraocular movements intact.      Conjunctiva/sclera: Conjunctivae normal.      Pupils: Pupils are equal, round, and reactive to light.   Cardiovascular:      Rate and Rhythm: Normal rate and regular rhythm.      Pulses: Normal pulses.      Heart sounds: Normal heart sounds.   Pulmonary:      Effort: Pulmonary effort is normal.      Breath sounds: Normal breath sounds.   Abdominal:      General: Abdomen is flat. Bowel sounds are normal.      Palpations: Abdomen is soft.      Comments: Well healed surgical scar   Genitourinary:     Comments: Moncada with clear yellow urine in bag  Musculoskeletal:         General: No swelling or deformity. Normal range of motion.      Cervical back: Normal range of motion and neck supple.   Skin:     General: Skin is warm and dry.   Neurological:      Mental Status: She is alert and oriented to person, place, and time. Mental status is at baseline.   Psychiatric:         Behavior: Behavior normal.         Thought Content: Thought content normal.       Significant Labs: BMP:   Recent Labs   Lab 01/23/23  0708   GLU 91      K 3.4*      CO2 17*   BUN 18   CREATININE 1.6*   CALCIUM 9.1   MG 1.9      Microbiology Results (last 7 days)       Procedure Component Value Units Date/Time    Blood culture [[id number]] Collected: 01/23/23 1005    Order Status: Completed Specimen: Blood Updated: 01/23/23 1745     Blood Culture, Routine No Growth to date    Narrative:      Collection has been rescheduled by VS1 at 01/23/2023 08:40 Reason:   Patient unavailable in shower will come back  Collection has been rescheduled by VS1 at 01/23/2023 08:40 Reason:   Patient unavailable in shower will come back    Clostridium difficile EIA [[id number]]     Order Status: No result Specimen: Stool     Blood culture [[id number]] Collected: 01/23/23 1203    Order Status: Sent Specimen: Blood Updated: 01/23/23 1218    Narrative:      Collection has been rescheduled by VS1 at 01/23/2023 08:40 Reason:   Patient unavailable in shower will come back  Collection has been rescheduled by VS1 at 01/23/2023 10:06 Reason:   Hard stick will come back for second set  Collection has been rescheduled by VS1 at 01/23/2023 08:40 Reason:   Patient unavailable in shower will come back  Collection has been rescheduled by VS1 at 01/23/2023 10:06 Reason:   Hard stick will come back for second set    Blood culture x two cultures. Draw prior to antibiotics. [[id number]]  (Abnormal) Collected: 01/21/23 0659    Order Status: Completed Specimen: Blood from Peripheral, Wrist, Right Updated: 01/23/23 1026     Blood Culture, Routine Gram stain festus bottle: Gram stain festus bottle: Gram negative rods      Results called to and read back by:Kirit Joy RN. 01/21/2023  21:37      ESCHERICHIA COLI  For susceptibility see order #[id number]      Narrative:      Aerobic and anaerobic    Blood culture x two cultures. Draw prior to antibiotics. [[id number]]  (Abnormal)  (Susceptibility) Collected: 01/21/23 0650    Order Status: Completed Specimen: Blood from Peripheral, Lower Arm, Right Updated: 01/23/23 1025     Blood Culture, Routine Gram stain festus bottle: Gram stain fesuts bottle:  Gram negative rods      Results called to and read back by:Kirit Joy RN. 01/21/2023  21:37      Gram stain aer bottle: Gram negative rods      Positive results previously called 01/22/2023  10:09      ESCHERICHIA COLI    Narrative:      Aerobic and anaerobic    Urine culture [[id number]]  (Abnormal)  (Susceptibility) Collected: 01/21/23 0849    Order Status: Completed Specimen: Urine Updated: 01/23/23 1023     Urine Culture, Routine ESCHERICHIA COLI  >100,000 cfu/ml      Narrative:      Specimen Source->Urine    Clostridium difficile EIA [[id number]]     Order Status: Canceled Specimen: Stool           All pertinent labs within the past 24 hours have been reviewed.    Significant Imaging: I have reviewed all pertinent imaging results/findings within the past 24 hours.

## 2023-01-23 NOTE — ASSESSMENT & PLAN NOTE
T103, , WBC 18, GEORGE  Most likely secondary to pyelonephritis  UC and BC with ecoli  CT scan with kidney fat stranding, mild hydro  Kidney US reviewed  On pip-tazo; stopped vanco  On IV fluids

## 2023-01-23 NOTE — HPI
27 year old female with history of DM, HTN, ESRD secondary to diabetic nephropathy and HTN, s/p SPK 11/3/22 (Thymo induction, CMV D-/R+; MMF + tacro + prednisone) with post-op course was complicated by urinary retention requiring Moncada replacement and subsequent resolution, hx of UTI (12/2022 - Ucx E. Faecium- amp susceptible & Diphtheroids), typhlitis, who now presents with new onset fevers and abdominal pain. Presentation notable for T max 103 F,  CT w/ transplant hydronephrosis, no discrete collections, WBC down trending, GEORGE (improving), hemodynamically stable, blood and urine cultures with E coli, improved fever curve post pip/tazo initiation, improved clinically and voiding without issues. Repeat cultures sent.     Patient currently states her PO intake is much improved, fevers reportedly improving, states she did not even feel dysuria or suprapubic pressure, was voiding without difficulty and a few days ago started abruptly vomiting along with having new abdominal pain, denies rigors and chills, but notes fever was subtle. Denies productive cough, new lesions or rashes, diarrhea. CT scan this admission shows edematous appearance of the renal transplant noting transplant hydronephrosis.  Further evaluation with ultrasound is recommended.  No discrete peritransplant fluid collections. ID consulted for +blood/urine cx

## 2023-01-23 NOTE — ASSESSMENT & PLAN NOTE
- admitted with hypotension and fever  - blood/urine cx +E coli  - remains on Zosyn  - ID consulted for abx management.   - repeat blood cs 1/23.

## 2023-01-23 NOTE — CARE UPDATE
RAPID RESPONSE NURSE ROUND       Rounding completed with charge RN, Rachel. No additional concerns verbalized at this time. Instructed to call [phone] for further concerns or assistance.

## 2023-01-23 NOTE — ASSESSMENT & PLAN NOTE
Last pyelo 12/0222 with enterococcus  UA with >100 WBC moderate bacteria  See severe sepsis for details

## 2023-01-23 NOTE — PROGRESS NOTES
Admit Note     Met with patient and mother to assess needs. Patient is a 27 y.o. single female, admitted for:  Pyelonephritis [N12]  Tachycardia [R00.0]  Chest pain [R07.9]  Severe sepsis [A41.9, R65.20]  Sepsis without acute organ dysfunction, due to unspecified organism [A41.9]  Nausea and vomiting, unspecified vomiting type [R11.2]     Patient admitted from emergency department on 1/21/2023 .  At this time, patient presents as alert and oriented x 4, pleasant, good eye contact, well groomed, recall good, concentration/judgement good, average intelligence, calm, communicative, cooperative, asking and answering questions appropriately, and in high spirits.  At this time, patients caregiver presents as alert and oriented x 4, pleasant, good eye contact, well groomed, recall good, concentration/judgement good, average intelligence, calm, communicative, cooperative, and asking and answering questions appropriately.    Household/Family Systems     Patient resides with patient's mother and brother, at [address] [place] [address].  Support system includes her brother Mustapha, her mother Kiersten Garcia, and her uncle Florentin.  Patient does not have dependents that are need of being cared for.     Patients primary caregiver is Kiersten Garcia, patients mother, phone number [phone].  Confirmed patients contact information is [phone] (home);   Telephone Information:   Mobile [phone]   .    During admission, patient's caregiver plans to stay in patient's room.  Confirmed patient and patients caregivers do have access to reliable transportation.    Cognitive Status/Learning     Patient reports reading ability as 12th grade and states patient does not have difficulty with reading, writing, seeing, hearing, comprehension, learning, and memory.  Patient reports patient learns best by visual learning.   Needed: No.   Highest education level: High School (9-12) or  GED    Vocation/Disability   .  Working for Income: No  If no, reason not working: Disability  Patient is disabled due to ESRD since .  Prior to disability, patient  was employed at [employer].    Adherence     Patient reports a high level of adherence to patients health care regimen.  Adherence counseling and education provided. Patient verbalizes understanding.    Substance Use    Patient reports the following substance usage.    Tobacco: none, patient denies any use.  Alcohol: none, patient denies any use.  Illicit Drugs/Non-prescribed Medications: none, patient denies any use.  Patient states clear understanding of the potential impact of substance use.  Substance abstinence/cessation counseling, education and resources provided and reviewed.     Services Utilizing/ADLS    Infusion Service: Prior to admission, patient utilizing? no  Home Health: Prior to admission, patient utilizing? no  DME: Prior to admission, yes bpc  Pulmonary/Cardiac Rehab: Prior to admission, no  Dialysis:  Prior to admission, no  Transplant Specialty Pharmacy:  Prior to admission, yes; [facility].    Prior to admission, patient reports patient was independent with ADLS and was driving.  Patient reports patient is able to care for self at this time. Patient indicates a willingness to care for self once medically cleared to do so.    Insurance/Medications    Insured by   Payer/Plan Subscr  Sex Relation Sub. Ins. ID Effective Group Num   1. SIXTO BOATENG* 1995 Female Self [id number] [id number]                                   [address]   2. SIXTO BOATENG* 1995 Female Self [id number] SECUREFULL                                   [address]      Primary Insurance (for UNOS reporting): Public Insurance - Medicare & [facility]  Secondary Insurance (for UNOS reporting): Public Insurance - Medicaid    Patient reports patient is able to  obtain and afford medications at this time and at time of discharge.    Living Will/Healthcare Power of     Patient states patient does not have a LW and/or HCPA.   provided education regarding LW and HCPA and the completion of forms.    Coping/Mental Health    Patient is coping well with the aid of  family members and friends.  Patient denies mental health difficulties.   educated patient on resources available both inpatient and outpatient. Patient agrees to contact transplant team with needs, questions, or concerns as they arise.     Discharge Planning    At time of discharge, patient plans to return to patient's home under the care of self and/or mother Kiersten Radha.  Patients mother will transport patient.  Per rounds today, expected discharge date has not been medically determined at this time. Patient and patients caregiver verbalize understanding and are involved in treatment planning and discharge process.    Additional Concerns    Patient's caretaker denies additional needs and/or concerns at this time. Patient is being followed for needs, education, resources, information, emotional support, supportive counseling, and for supportive and skilled discharge plan of care.  providing ongoing psychosocial support, education, resources and d/c planning as needed.  SW remains available.  remains available. Patient's caregiver verbalizes understanding and agreement with information reviewed,  availability and how to access available resources as needed. Patient denies additional needs and/or concerns at this time. Patient verbalizes understanding and agreement with information reviewed, social work availability, and how to access available resources as needed.

## 2023-01-23 NOTE — SUBJECTIVE & OBJECTIVE
Subjective:   History of Present Illness:  Mrs. Read is a 27 year old female with history of KP 11/2022 who presented to [facility] ED for N/V/F. She reports not feeling well with chills on Thursday. Her chills improved with tylenol. Friday morning she was on the way to her orthodontist, she had nausea then vomited. She went home took her BP, noticed fever, had more nausea and vomiting then came to our ED.  Denies syncope, chest pain, difficulty breathing, abdominal pain, changes in bowel movements or urination, hematuria, hematochezia, melena  In ED hypotensive with T103. WBC elevated, sCr elevated, UA consistent with infection. Blood and urine cultures obtained. Given pip-tazo and KTS consulted for admission.     Ms. Read is a 27 y.o. year old female who is status post Kidney, Pancreas Transplant - 11/3/2022  (#1).  Her maintenance immunosuppression consists of:   Immunosuppressants (From admission, onward)      Start     Stop Route Frequency Ordered    01/22/23 1800  tacrolimus capsule 5 mg         -- Oral 2 times daily 01/22/23 1412            Hospital Course:  Patient admitted with fever, suspect urosepsis. Blood/urine cx positive with possible E coli. Remains on Zosyn. Febrile 100.8 with associated chills. ID consulted for abx recs, appreciate assistance. Kidney US with persistent mild hydro- dc placed 1/22. Will plan to keep dc in place for another 24 hours and reassess. Cont IVF. Repeat blood cx in process. Diarrhea improved. Denies abdominal pains. VSS. Cont to monitor    Past Medical, Surgical, Family, and Social History:   Unchanged from H&P.    Scheduled Meds:   aspirin  81 mg Oral Daily    atorvastatin  20 mg Oral QHS    ergocalciferol  50,000 Units Oral Q7 Days    heparin (porcine)  5,000 Units Subcutaneous Q8H    multivitamin  1 tablet Oral Daily    mupirocin   Nasal BID    piperacillin-tazobactam (ZOSYN) IVPB  4.5 g Intravenous Q8H    predniSONE  5 mg Oral Daily    sodium bicarbonate  1,300 mg  Oral BID    tacrolimus  5 mg Oral BID    valGANciclovir  450 mg Oral Daily    venlafaxine  37.5 mg Oral Daily     Continuous Infusions:  PRN Meds:acetaminophen, dextrose 10%, dextrose 10%, diphenhydrAMINE, fludrocortisone, glucagon (human recombinant), glucose, glucose, HYDROcodone-acetaminophen, morphine, naloxone, ondansetron, polyethylene glycol, prochlorperazine, sodium chloride 0.9%, traZODone    Intake/Output - Last 3 Shifts         01/21 0700 01/22 0659 01/22 0700 01/23 0659 01/23 0700 01/24 0659    P.O.     I.V. (mL/kg)  432.9 (6.4)     IV Piggyback 2350.1 300     Total Intake(mL/kg) 2940.1 (45.9) 972.9 (14.5) 1150 (17.1)    Urine (mL/kg/hr) 675 (0.4) 2560 (1.6) 940 (3.5)    Emesis/NG output  0     Stool 0      Total Output 675 2560 940    Net +2265.1 -1587.1 +210           Stool Occurrence 2 x      Emesis Occurrence  1 x              Review of Systems   Constitutional:  Positive for chills and fever. Negative for appetite change and fatigue.   HENT: Negative.     Eyes: Negative.    Respiratory: Negative.     Cardiovascular: Negative.  Negative for chest pain and leg swelling.   Gastrointestinal:  Positive for nausea. Negative for abdominal distention, abdominal pain, constipation, diarrhea and vomiting.   Endocrine: Negative.    Genitourinary: Negative.  Negative for decreased urine volume, difficulty urinating and dysuria.   Musculoskeletal: Negative.    Skin: Negative.    Neurological: Negative.  Negative for headaches.   Psychiatric/Behavioral: Negative.  Negative for confusion and decreased concentration.     Objective:     Vital Signs (Most Recent):  Temp: (!) 100.5 °F (38.1 °C) (01/23/23 1000)  Pulse: (!) 122 (01/23/23 0834)  Resp: 16 (01/23/23 0831)  BP: 124/74 (01/23/23 0834)  SpO2: 95 % (01/23/23 0831)   Vital Signs (24h Range):  Temp:  [98 °F (36.7 °C)-100.8 °F (38.2 °C)] 100.5 °F (38.1 °C)  Pulse:  [] 122  Resp:  [16-20] 16  SpO2:  [93 %-100 %] 95 %  BP: ()/()  "124/74     Weight: 67.2 kg (148 lb 2.4 oz)  Height: 5' 4" (162.6 cm)  Body mass index is 25.43 kg/m².    Physical Exam  Vitals and nursing note reviewed.   Constitutional:       General: She is not in acute distress.  Eyes:      General: No scleral icterus.  Cardiovascular:      Rate and Rhythm: Tachycardia present.   Pulmonary:      Effort: Pulmonary effort is normal. No respiratory distress.   Abdominal:      General: Bowel sounds are normal. There is no distension.      Palpations: Abdomen is soft.      Tenderness: There is no abdominal tenderness.   Musculoskeletal:      Right lower leg: No edema.      Left lower leg: No edema.   Skin:     Coloration: Skin is not jaundiced.   Neurological:      Mental Status: She is alert.   Psychiatric:         Mood and Affect: Mood normal.         Behavior: Behavior normal.         Thought Content: Thought content normal.         Judgment: Judgment normal.       Laboratory:  CBC:   Recent Labs   Lab 01/17/23  0937 01/21/23  0659 01/22/23  0722   WBC 4.34 18.99* 13.17*   RBC 3.88* 3.93* 3.44*   HGB 12.2 12.8 11.1*   HCT 40.5 40.0 34.4*   * 342 278   * 102* 100*   MCH 31.4* 32.6* 32.3*   MCHC 30.1* 32.0 32.3     CMP:   Recent Labs   Lab 01/21/23  0659 01/22/23  0722 01/23/23  0708    109 91   CALCIUM 10.6* 8.0* 9.1   ALBUMIN 4.0 2.7* 2.7*   PROT 9.0* 6.4 7.0   * 137 136   K 4.4 3.2* 3.4*   CO2 18* 28 17*    99 107   BUN 20 22* 18   CREATININE 1.5* 1.7* 1.6*   ALKPHOS 138* 103 116   ALT 26 18 18   AST 22 15 17     Labs within the past 24 hours have been reviewed.    Diagnostic Results:  None  "

## 2023-01-23 NOTE — ASSESSMENT & PLAN NOTE
27 year old female with history of DM, HTN, ESRD secondary to diabetic nephropathy and HTN, s/p SPK 11/3/22 (Thymo induction, CMV D-/R+; MMF + tacro + prednisone) with post-op course was complicated by urinary retention requiring Moncada replacement and subsequent resolution, hx of UTI (12/2022 - Ucx E. Faecium- amp susceptible & Diphtheroids), typhlitis, who now presents with new onset fevers and abdominal pain. Presentation notable for T max 103 F,  CT w/ transplant hydronephrosis, no discrete collections, WBC down trending, GEORGE (improving), hemodynamically stable, blood and urine cultures with E coli, improved fever curve post pip/tazo initiation, improved clinically and voiding without issues. Repeat cultures sent.       Recommendations      - Continue piperacillin/tazobactam IV while inpatient, follow repeat blood cultuers that were sent today.     - Upon discharge, she can be placed on ciprofloxacin 500 mg q12h based on CrCl for a total course of 14 days, EOT 2/5/2023

## 2023-01-23 NOTE — HOSPITAL COURSE
Patient admitted with fever, suspect urosepsis/pyelonephritis. Blood/urine cx positive with E coli. Repeat cx ngtd. On Zosyn transitioned to PO cipro to complete 14 day course (EOT 2/5/23) per ID recs. Has been afebrile for 24 hrs.  Kidney US with persistent mild hydro- dc placed 1/22, removed 1/24. Obtain PVR. Diarrhea improved. Denies abdominal pains. VSS. Tentative d/c tomorrow if stable on PO abx. Cont to monitor

## 2023-01-23 NOTE — PROGRESS NOTES
[facility] - Transplant Stepdown  Kidney Transplant  Progress Note      Reason for Follow-up: Reassessment of Kidney, Pancreas Transplant - 11/3/2022  (#1) recipient and management of immunosuppression.    ORGAN:  RIGHT KIDNEY   Donor Type:  Donation after Brain Death   Donor CMV Status: Negative  Donor HBcAB:Negative  Donor HCV Status:Negative  Donor HBV KEE: Negative  Donor HCV KEE: Negative      Subjective:   History of Present Illness:  Mrs. Read is a 27 year old female with history of KP 11/2022 who presented to [facility] ED for N/V/F. She reports not feeling well with chills on Thursday. Her chills improved with tylenol. Friday morning she was on the way to her orthodontist, she had nausea then vomited. She went home took her BP, noticed fever, had more nausea and vomiting then came to our ED.  Denies syncope, chest pain, difficulty breathing, abdominal pain, changes in bowel movements or urination, hematuria, hematochezia, melena  In ED hypotensive with T103. WBC elevated, sCr elevated, UA consistent with infection. Blood and urine cultures obtained. Given pip-tazo and KTS consulted for admission.     Ms. Read is a 27 y.o. year old female who is status post Kidney, Pancreas Transplant - 11/3/2022  (#1).  Her maintenance immunosuppression consists of:   Immunosuppressants (From admission, onward)      Start     Stop Route Frequency Ordered    01/22/23 1800  tacrolimus capsule 5 mg         -- Oral 2 times daily 01/22/23 1412            Hospital Course:  Patient admitted with fever, suspect urosepsis. Blood/urine cx positive with possible E coli. Remains on Zosyn. Febrile 100.8 with associated chills. ID consulted for abx recs, appreciate assistance. Kidney US with persistent mild hydro- dc placed 1/22. Will plan to keep dc in place for another 24 hours and reassess. Cont IVF. Repeat blood cx in process. Diarrhea improved. Denies abdominal pains. VSS. Cont to monitor    Past Medical, Surgical, Family, and  Social History:   Unchanged from H&P.    Scheduled Meds:   aspirin  81 mg Oral Daily    atorvastatin  20 mg Oral QHS    ergocalciferol  50,000 Units Oral Q7 Days    heparin (porcine)  5,000 Units Subcutaneous Q8H    multivitamin  1 tablet Oral Daily    mupirocin   Nasal BID    piperacillin-tazobactam (ZOSYN) IVPB  4.5 g Intravenous Q8H    predniSONE  5 mg Oral Daily    sodium bicarbonate  1,300 mg Oral BID    tacrolimus  5 mg Oral BID    valGANciclovir  450 mg Oral Daily    venlafaxine  37.5 mg Oral Daily     Continuous Infusions:  PRN Meds:acetaminophen, dextrose 10%, dextrose 10%, diphenhydrAMINE, fludrocortisone, glucagon (human recombinant), glucose, glucose, HYDROcodone-acetaminophen, morphine, naloxone, ondansetron, polyethylene glycol, prochlorperazine, sodium chloride 0.9%, traZODone    Intake/Output - Last 3 Shifts         01/21 0700  01/22 0659 01/22 0700 01/23 0659 01/23 0700 01/24 0659    P.O.     I.V. (mL/kg)  432.9 (6.4)     IV Piggyback 2350.1 300     Total Intake(mL/kg) 2940.1 (45.9) 972.9 (14.5) 1150 (17.1)    Urine (mL/kg/hr) 675 (0.4) 2560 (1.6) 940 (3.5)    Emesis/NG output  0     Stool 0      Total Output 675 2560 940    Net +2265.1 -1587.1 +210           Stool Occurrence 2 x      Emesis Occurrence  1 x              Review of Systems   Constitutional:  Positive for chills and fever. Negative for appetite change and fatigue.   HENT: Negative.     Eyes: Negative.    Respiratory: Negative.     Cardiovascular: Negative.  Negative for chest pain and leg swelling.   Gastrointestinal:  Positive for nausea. Negative for abdominal distention, abdominal pain, constipation, diarrhea and vomiting.   Endocrine: Negative.    Genitourinary: Negative.  Negative for decreased urine volume, difficulty urinating and dysuria.   Musculoskeletal: Negative.    Skin: Negative.    Neurological: Negative.  Negative for headaches.   Psychiatric/Behavioral: Negative.  Negative for confusion and  "decreased concentration.     Objective:     Vital Signs (Most Recent):  Temp: (!) 100.5 °F (38.1 °C) (01/23/23 1000)  Pulse: (!) 122 (01/23/23 0834)  Resp: 16 (01/23/23 0831)  BP: 124/74 (01/23/23 0834)  SpO2: 95 % (01/23/23 0831)   Vital Signs (24h Range):  Temp:  [98 °F (36.7 °C)-100.8 °F (38.2 °C)] 100.5 °F (38.1 °C)  Pulse:  [] 122  Resp:  [16-20] 16  SpO2:  [93 %-100 %] 95 %  BP: ()/() 124/74     Weight: 67.2 kg (148 lb 2.4 oz)  Height: 5' 4" (162.6 cm)  Body mass index is 25.43 kg/m².    Physical Exam  Vitals and nursing note reviewed.   Constitutional:       General: She is not in acute distress.  Eyes:      General: No scleral icterus.  Cardiovascular:      Rate and Rhythm: Tachycardia present.   Pulmonary:      Effort: Pulmonary effort is normal. No respiratory distress.   Abdominal:      General: Bowel sounds are normal. There is no distension.      Palpations: Abdomen is soft.      Tenderness: There is no abdominal tenderness.   Musculoskeletal:      Right lower leg: No edema.      Left lower leg: No edema.   Skin:     Coloration: Skin is not jaundiced.   Neurological:      Mental Status: She is alert.   Psychiatric:         Mood and Affect: Mood normal.         Behavior: Behavior normal.         Thought Content: Thought content normal.         Judgment: Judgment normal.       Laboratory:  CBC:   Recent Labs   Lab 01/17/23  0937 01/21/23  0659 01/22/23  0722   WBC 4.34 18.99* 13.17*   RBC 3.88* 3.93* 3.44*   HGB 12.2 12.8 11.1*   HCT 40.5 40.0 34.4*   * 342 278   * 102* 100*   MCH 31.4* 32.6* 32.3*   MCHC 30.1* 32.0 32.3     CMP:   Recent Labs   Lab 01/21/23  0659 01/22/23  0722 01/23/23  0708    109 91   CALCIUM 10.6* 8.0* 9.1   ALBUMIN 4.0 2.7* 2.7*   PROT 9.0* 6.4 7.0   * 137 136   K 4.4 3.2* 3.4*   CO2 18* 28 17*    99 107   BUN 20 22* 18   CREATININE 1.5* 1.7* 1.6*   ALKPHOS 138* 103 116   ALT 26 18 18   AST 22 15 17     Labs within the past 24 hours " "have been reviewed.    Diagnostic Results:  None    Assessment/Plan:     * Sepsis due to Escherichia coli  - admitted with hypotension and fever  - blood/urine cx +E coli  - remains on Zosyn  - ID consulted for abx management.   - repeat blood cs .      Pyelonephritis, acute  Last pyelo 222 with enterococcus  UA with >100 WBC moderate bacteria  See severe sepsis for details    Severe sepsis  T103, , WBC 18, GEORGE  Most likely secondary to pyelonephritis  UC and BC with ecoli  CT scan with kidney fat stranding, mild hydro  Kidney US reviewed  On pip-tazo; stopped vanco  On IV fluids      Long-term use of immunosuppressant medication  At home on tac , /500, pred 5  Continue home tac and pred  MMF on hold for pyelo      Prophylactic immunotherapy  Continue immunosuppression as per problem "Long-term use of immunosuppressant medication"      At risk for opportunistic infections  - Continue Valcyte for CMV prophylaxis        Status post -donor simultaneous pancreas kidney transplantation  27 ESKD from DM1 on HD   DBDSPK 11/3/22 KDPI 1 CIT 6.5h cPRA 0 DSA 0 induced with thymo. OR without specific notation. sCr decreased to 1 post op  Admit 2022 with hydro and pyelo with enterococcus. Kidney large and takes up much of pelvis  Now BL sCr 0.8-1.1    (HFpEF) heart failure with preserved ejection fraction  EF 55 from 2022  Monitor fluid status      Acute kidney injury superimposed on chronic kidney disease  Most likely from pyelonephritis  See severe sepsis for details      Secondary hyperparathyroidism  On ergo      Recurrent major depressive disorder, in partial remission  Continue home venlafaxine    Anemia in ESRD (end-stage renal disease)  Monitor       Hyperlipidemia  Atorvastatin while in hospital  Resume rosuvastatin on discharge    Vitamin D deficiency  Continue home ergo        Discharge Planning: not current candidate     Shanika Martinez PA-C  Kidney Transplant  [facility] - " Transplant Stepdown

## 2023-01-23 NOTE — PROGRESS NOTES
Fall bundle in place. Pt. Free from falls/injury. Pt. Denies experiencing any type of pain this shift. Pt. Did have 2 emesis events, given IV Zofran for first occurrence & then Compazine for the second occurrence; no repeated episodes for the remainder of the shift. Pt. On continuous NS @ 50 mls/hr. Moncada remains CDI. Pt. Remained afebrile the entirety of the shift. The POC was reviewed with pt. & pts' mother, questions encouraged & answered. No further concern at this time.

## 2023-01-23 NOTE — CONSULTS
[facility] - Transplant Stepdown  Infectious Disease  Consult Note    Patient Name: Isabela Read  MRN: [id number]  Admission Date: 1/21/2023  Hospital Length of Stay: 2 days  Attending Physician: Melani Mcintyre MD  Primary Care Provider: Tavo Bhatia MD     Isolation Status: No active isolations    Patient information was obtained from patient and ER records.      Inpatient consult to Infectious Diseases  Consult performed by: Shawn Nieves MD  Consult ordered by: Shanika Martinez PA-C        Assessment/Plan:     Bacteremia due to Escherichia coli  27 year old female with history of DM, HTN, ESRD secondary to diabetic nephropathy and HTN, s/p SPK 11/3/22 (Thymo induction, CMV D-/R+; MMF + tacro + prednisone) with post-op course was complicated by urinary retention requiring Moncada replacement and subsequent resolution, hx of UTI (12/2022 - Ucx E. Faecium- amp susceptible & Diphtheroids), typhlitis, who now presents with new onset fevers and abdominal pain. Presentation notable for T max 103 F,  CT w/ transplant hydronephrosis, no discrete collections, WBC down trending, GEORGE (improving), hemodynamically stable, blood and urine cultures with E coli, improved fever curve post pip/tazo initiation, improved clinically and voiding without issues. Repeat cultures sent.       Recommendations      - Continue piperacillin/tazobactam IV while inpatient, follow repeat blood cultuers that were sent today.     - Upon discharge, she can be placed on ciprofloxacin 500 mg q12h based on CrCl for a total course of 14 days, EOT 2/5/2023            Thank you for your consult. I will sign off. Please contact us if you have any additional questions.    Shawn Nieves MD  Infectious Disease  [facility]    Subjective:     Principal Problem: Sepsis due to Escherichia coli    HPI: 27 year old female with history of DM, HTN, ESRD secondary to diabetic nephropathy and HTN, s/p SPK 11/3/22 (Thymo  induction, CMV D-/R+; MMF + tacro + prednisone) with post-op course was complicated by urinary retention requiring Moncada replacement and subsequent resolution, hx of UTI (12/2022 - Ucx E. Faecium- amp susceptible & Diphtheroids), typhlitis, who now presents with new onset fevers and abdominal pain. Presentation notable for T max 103 F,  CT w/ transplant hydronephrosis, no discrete collections, WBC down trending, GEORGE (improving), hemodynamically stable, blood and urine cultures with E coli, improved fever curve post pip/tazo initiation, improved clinically and voiding without issues. Repeat cultures sent.     Patient currently states her PO intake is much improved, fevers reportedly improving, states she did not even feel dysuria or suprapubic pressure, was voiding without difficulty and a few days ago started abruptly vomiting along with having new abdominal pain, denies rigors and chills, but notes fever was subtle. Denies productive cough, new lesions or rashes, diarrhea. CT scan this admission shows edematous appearance of the renal transplant noting transplant hydronephrosis.  Further evaluation with ultrasound is recommended.  No discrete peritransplant fluid collections. ID consulted for +blood/urine cx        Past Medical History:   Diagnosis Date    Acute cystitis without hematuria 11/25/2022    Acute kidney injury superimposed on chronic kidney disease 4/7/2021    Anemia     Bilious vomiting with nausea 12/12/2022    Chronic hypertension with exacerbation during pregnancy in second trimester 11/6/2020    Current regimen (11/6/20):  - Carvedilol 12.5 mg BID - Nifedipine 30 mg daily Baseline CKD + proteinuria    Chronic kidney disease     Depression     Diabetes mellitus     Diabetic retinopathy     Diarrhea     Encounter for blood transfusion     End stage renal failure on dialysis     Fever 12/1/2022    Fever of undetermined origin 12/12/2022    Gastroparesis     Glaucoma     Hepatomegaly  4/29/2021    History of diabetes mellitus, type I 12/20/2022    Hx of psychiatric care     Hyperlipidemia     Hypertension     Nausea and vomiting 12/12/2022    Nephrotic syndrome     Nephrotic syndrome 3/4/2021    Nonspecific reaction to tuberculin skin test without active tuberculosis 10/1/2021    Palpitations     Poor fetal growth affecting management of mother in second trimester 10/15/2020    Pyelonephritis, acute 11/26/2022    Restrictive lung disease     Retinal detachment     Sepsis 12/1/2022    Sepsis 11/25/2022    Severe pre-eclampsia in second trimester 11/6/2020    SIRS (systemic inflammatory response syndrome) 12/6/2022    Type 1 diabetes mellitus with end-stage renal disease (ESRD) 2/24/2015    Followed by Dr. Mayo.  Last A1C was 13  Currently on lantus 20 units qAM and humolog 10 units with meals  - a fetal echocardiogram around 22-24 weeks - needs eye exam, podiatry exam  - needs EKG, maternal echo and fu with cardiology  - ASA 81mg, folic acid 4mg PO daily - hemoglobin A1c every 4-6 weeks -24 hour urine for protein and creatinine clearance should be performed. Patient will also need a       Past Surgical History:   Procedure Laterality Date    AV FISTULA PLACEMENT Left 04/07/2021    Procedure: CREATION, AV FISTULA;  Surgeon: Roberto Ryan MD;  Location: [facility] OR [address] [place] [address];  Service: Peripheral Vascular;  Laterality: Left;    COLONOSCOPY N/A 03/16/2022    Procedure: COLONOSCOPY;  Surgeon: Tavo Kwok MD;  Location: [facility] (4TH [facility]);  Service: Endoscopy;  Laterality: N/A;  Questionable history of delayed gastric emptying longstanding diabetes now on eating pre transplant workup for history of nausea vomiting which seems to have improved with dialysis also chronic diarrhea and history of anemia pre transplant workup for kidney transplant. 3    CYSTOSCOPY      ESOPHAGOGASTRODUODENOSCOPY N/A 03/16/2022    Procedure: EGD (ESOPHAGOGASTRODUODENOSCOPY);  Surgeon: Tavo  ARMIN Kwok MD;  Location: [facility] ENDO (4TH FLR);  Service: Endoscopy;  Laterality: N/A;  Questionable history of delayed gastric emptying longstanding diabetes now on eating pre transplant workup for history of nausea vomiting which seems to have improved with dialysis also chronic diarrhea and history of anemia pre transplant workup for    FISTULOGRAM N/A 08/11/2021    Procedure: Fistulogram;  Surgeon: Roberto Ryan MD;  Location: [facility] CATH LAB;  Service: Cardiology;  Laterality: N/A;    KIDNEY TRANSPLANT Right 11/02/2022    Procedure: TRANSPLANT, KIDNEY;  Surgeon: Kumar Rodriges Jr., MD;  Location: [facility] OR 2ND FLR;  Service: Transplant;  Laterality: Right;    LASER PHOTOCOAGULATION OF RETINA Right 05/31/2022    Procedure: PHOTOCOAGULATION, RETINA, USING LASER;  Surgeon: Maximilian Montaño MD;  Location: [facility] OR 1ST FLR;  Service: Ophthalmology;  Laterality: Right;    PERCUTANEOUS TRANSLUMINAL ANGIOPLASTY OF ARTERIOVENOUS FISTULA N/A 08/11/2021    Procedure: PTA, AV FISTULA;  Surgeon: Roberto Ryan MD;  Location: [facility] CATH LAB;  Service: Cardiology;  Laterality: N/A;    REMOVAL IMPLANT, POSTERIOR SEGMENT, INTRAOCULAR Right 02/01/2022    Procedure: REMOVAL IMPLANT, POSTERIOR SEGMENT, INTRAOCULAR;  Surgeon: Maximilian Montaño MD;  Location: [facility] OR 1ST FLR;  Service: Ophthalmology;  Laterality: Right;    REPAIR OF RETINAL DETACHMENT WITH VITRECTOMY Right 01/25/2022    Procedure: REPAIR, RETINAL DETACHMENT, WITH VITRECTOMY, MEMBRANE PEEL, LASER, INJECTION OF GAS VS OIL;  Surgeon: Maximilian Montaño MD;  Location: [facility] OR 1ST FLR;  Service: Ophthalmology;  Laterality: Right;    REVISION OF ARTERIOVENOUS FISTULA Left 12/10/2021    Procedure: REVISION, AV FISTULA with BVT;  Surgeon: Roberto Ryan MD;  Location: [facility] OR 2ND FLR;  Service: Peripheral Vascular;  Laterality: Left;    TRANSPLANTATION OF PANCREAS N/A 11/02/2022    Procedure: TRANSPLANT, PANCREAS;  Surgeon: Kumar Rodriges  MD Segun;  Location: [facility] OR [address] [place] [address];  Service: Transplant;  Laterality: N/A;    VITRECTOMY BY PARS PLANA APPROACH Right 02/01/2022    Procedure: VITRECTOMY, PARS PLANA APPROACH;  Surgeon: Maximilian Montaño MD;  Location: [facility] OR [employer];  Service: Ophthalmology;  Laterality: Right;    VITRECTOMY BY PARS PLANA APPROACH Right 05/31/2022    Procedure: VITRECTOMY, PARS PLANA APPROACH;  Surgeon: Maximilian Montaño MD;  Location: [facility] OR [address] [place] [address];  Service: Ophthalmology;  Laterality: Right;       Review of patient's allergies indicates:  No Known Allergies    Medications:  Medications Prior to Admission   Medication Sig    aspirin (ECOTRIN) 81 MG EC tablet Take 81 mg by mouth once daily.    ergocalciferol (ERGOCALCIFEROL) 50,000 unit Cap Take 1 capsule (50,000 Units total) by mouth every 7 days.    fludrocortisone (FLORINEF) 0.1 mg Tab Take 1 tablet (100 mcg total) by mouth 2 (two) times daily as needed (When standing blood pressure less than 90 systolic (top number)).    k phos di & mono-sod phos mono (K-PHOS-NEUTRAL) 250 mg Tab Take 1 tablet by mouth 2 (two) times a day.    magnesium oxide (MAG-OX) 400 mg (241.3 mg magnesium) tablet Take 1 tablet (400 mg total) by mouth 2 (two) times daily.    multivitamin (THERAGRAN) per tablet Take 1 tablet by mouth once daily.    mycophenolate (CELLCEPT) 250 mg Cap Take 2 capsules (500 mg total) by mouth 2 (two) times daily.    ondansetron (ZOFRAN-ODT) 8 MG TbDL DISSOLVE 1 tablet (8 mg total) by mouth every 8 (eight) hours as needed (nausea).    predniSONE (DELTASONE) 5 MG tablet Take 1 tablet (5 mg total) by mouth once daily.    promethazine (PHENERGAN) 12.5 MG Tab Take 1 tablet (12.5 mg total) by mouth every 6 (six) hours as needed (nausea).    rosuvastatin (CRESTOR) 10 MG tablet Take 1 tablet (10 mg total) by mouth every evening.    sodium bicarbonate 650 MG tablet Take 3 tablets (1,950 mg total) by mouth 3 (three) times daily.    tacrolimus (PROGRAF) 1 MG Cap  Take 4 capsules (4 mg total) by mouth every morning AND 4 capsules (4 mg total) every evening.    valGANciclovir (VALCYTE) 450 mg Tab Take 2 tablets (900 mg total) by mouth once daily. Stop 2/2/2023    venlafaxine (EFFEXOR XR) 37.5 MG 24 hr capsule Take 1 capsule (37.5 mg total) by mouth once daily.     Antibiotics (From admission, onward)      Start     Stop Route Frequency Ordered    01/23/23 1100  mupirocin 2 % ointment         01/28 0859 Nasl 2 times daily 01/23/23 0949    01/21/23 1645  piperacillin-tazobactam (ZOSYN) 4.5 g in dextrose 5 % in water (D5W) 5 % 100 mL IVPB (MB+)         -- IV Every 8 hours (non-standard times) 01/21/23 1538          Antifungals (From admission, onward)      None          Antivirals (From admission, onward)          Stop Route Frequency     valGANciclovir         -- Oral Daily             Immunization History   Administered Date(s) Administered    COVID-19, MRNA, LN-S, PF (MODERNA FULL 0.5 ML DOSE) 06/01/2021, 06/29/2021    HIB 1995, 1995, 1995, 02/12/1996    HPV Quadrivalent 09/12/2013    Hepatitis B (recombinant) Adjuvanted, 2 dose 06/16/2022    Hepatitis B, Adult 08/19/2021, 09/16/2021, 02/17/2022, 03/17/2022, 04/21/2022    Hepatitis B, Pediatric/Adolescent 1995, 1995, 1995    IPV 1995, 1995, 1995, 10/09/1999    Influenza - Quadrivalent - PF *Preferred* (6 months and older) 02/14/2011, 10/21/2019, 10/11/2022    Influenza - Trivalent - PF (ADULT) 02/14/2011    MMR 02/12/1996, 08/10/1999    Meningococcal Conjugate (MCV4P) 04/24/2009, 09/12/2013    PPD Test 05/20/2021    Pneumococcal Conjugate - 13 Valent 08/02/2021    Pneumococcal Polysaccharide - 23 Valent 01/05/2016, 03/24/2022    Tdap 1995, 1995, 1995, 05/31/1996, 08/10/1999, 04/24/2009, 02/14/2011, 09/12/2013    Varicella 09/12/2013       Family History       Problem Relation (Age of Onset)    Diabetes Brother    Heart disease Father     Hypertension Mother          Social History     Socioeconomic History    Marital status: Single   Occupational History    Occupation:  at [employer]   Tobacco Use    Smoking status: Never    Smokeless tobacco: Never   Substance and Sexual Activity    Alcohol use: No    Drug use: No    Sexual activity: Not Currently     Partners: Male     Comment: monogamous   Social History Narrative    Caregiver        Single    No kids    Had Miscarriage  At 23 weeks from preeclampsia    Disabled     Review of Systems   Constitutional:  Positive for fever. Negative for activity change and appetite change.   HENT:  Negative for congestion, drooling, rhinorrhea and sore throat.    Respiratory:  Negative for cough, chest tightness and shortness of breath.    Cardiovascular:  Negative for chest pain and palpitations.   Gastrointestinal:  Positive for abdominal pain. Negative for abdominal distention, blood in stool, diarrhea, nausea and vomiting.   Genitourinary:  Negative for dysuria, hematuria and urgency.   Musculoskeletal:  Negative for back pain.   Allergic/Immunologic: Positive for immunocompromised state.   Neurological:  Negative for syncope.   Psychiatric/Behavioral:  Negative for confusion and decreased concentration.    Objective:     Vital Signs (Most Recent):  Temp: 98.1 °F (36.7 °C) (01/23/23 1640)  Pulse: 92 (01/23/23 1640)  Resp: 18 (01/23/23 1640)  BP: 128/75 (01/23/23 1640)  SpO2: 95 % (01/23/23 1640) Vital Signs (24h Range):  Temp:  [98.1 °F (36.7 °C)-100.8 °F (38.2 °C)] 98.1 °F (36.7 °C)  Pulse:  [] 92  Resp:  [16-20] 18  SpO2:  [93 %-98 %] 95 %  BP: (111-195)/() 128/75     Weight: 67.2 kg (148 lb 2.4 oz)  Body mass index is 25.43 kg/m².    Estimated Creatinine Clearance: 49.8 mL/min (A) (based on SCr of 1.6 mg/dL (H)).    Physical Exam  Constitutional:       Appearance: Normal appearance. She is not ill-appearing or toxic-appearing.   HENT:      Head: Normocephalic and atraumatic.      Nose:  Nose normal.      Mouth/Throat:      Mouth: Mucous membranes are moist.      Pharynx: Oropharynx is clear.   Eyes:      Extraocular Movements: Extraocular movements intact.      Conjunctiva/sclera: Conjunctivae normal.      Pupils: Pupils are equal, round, and reactive to light.   Cardiovascular:      Rate and Rhythm: Normal rate and regular rhythm.      Pulses: Normal pulses.      Heart sounds: Normal heart sounds.   Pulmonary:      Effort: Pulmonary effort is normal.      Breath sounds: Normal breath sounds.   Abdominal:      General: Abdomen is flat. Bowel sounds are normal.      Palpations: Abdomen is soft.      Comments: Well healed surgical scar   Genitourinary:     Comments: Moncada with clear yellow urine in bag  Musculoskeletal:         General: No swelling or deformity. Normal range of motion.      Cervical back: Normal range of motion and neck supple.   Skin:     General: Skin is warm and dry.   Neurological:      Mental Status: She is alert and oriented to person, place, and time. Mental status is at baseline.   Psychiatric:         Behavior: Behavior normal.         Thought Content: Thought content normal.       Significant Labs: BMP:   Recent Labs   Lab 01/23/23  0708   GLU 91      K 3.4*      CO2 17*   BUN 18   CREATININE 1.6*   CALCIUM 9.1   MG 1.9     Microbiology Results (last 7 days)       Procedure Component Value Units Date/Time    Blood culture [[id number]] Collected: 01/23/23 1005    Order Status: Completed Specimen: Blood Updated: 01/23/23 1745     Blood Culture, Routine No Growth to date    Narrative:      Collection has been rescheduled by VS1 at 01/23/2023 08:40 Reason:   Patient unavailable in shower will come back  Collection has been rescheduled by VS1 at 01/23/2023 08:40 Reason:   Patient unavailable in shower will come back    Clostridium difficile EIA [[id number]]     Order Status: No result Specimen: Stool     Blood culture [[id number]] Collected: 01/23/23 1203    Order  Status: Sent Specimen: Blood Updated: 01/23/23 1218    Narrative:      Collection has been rescheduled by VS1 at 01/23/2023 08:40 Reason:   Patient unavailable in shower will come back  Collection has been rescheduled by VS1 at 01/23/2023 10:06 Reason:   Hard stick will come back for second set  Collection has been rescheduled by VS1 at 01/23/2023 08:40 Reason:   Patient unavailable in shower will come back  Collection has been rescheduled by VS1 at 01/23/2023 10:06 Reason:   Hard stick will come back for second set    Blood culture x two cultures. Draw prior to antibiotics. [[id number]]  (Abnormal) Collected: 01/21/23 0659    Order Status: Completed Specimen: Blood from Peripheral, Wrist, Right Updated: 01/23/23 1026     Blood Culture, Routine Gram stain festus bottle: Gram stain festus bottle: Gram negative rods      Results called to and read back by:Kirit Joy RN. 01/21/2023  21:37      ESCHERICHIA COLI  For susceptibility see order #[id number]      Narrative:      Aerobic and anaerobic    Blood culture x two cultures. Draw prior to antibiotics. [[id number]]  (Abnormal)  (Susceptibility) Collected: 01/21/23 0650    Order Status: Completed Specimen: Blood from Peripheral, Lower Arm, Right Updated: 01/23/23 1025     Blood Culture, Routine Gram stain festus bottle: Gram stain festus bottle: Gram negative rods      Results called to and read back by:Kirit Joy RN. 01/21/2023  21:37      Gram stain aer bottle: Gram negative rods      Positive results previously called 01/22/2023  10:09      ESCHERICHIA COLI    Narrative:      Aerobic and anaerobic    Urine culture [[id number]]  (Abnormal)  (Susceptibility) Collected: 01/21/23 0849    Order Status: Completed Specimen: Urine Updated: 01/23/23 1023     Urine Culture, Routine ESCHERICHIA COLI  >100,000 cfu/ml      Narrative:      Specimen Source->Urine    Clostridium difficile EIA [[id number]]     Order Status: Canceled Specimen: Stool           All pertinent labs within the past 24  hours have been reviewed.    Significant Imaging: I have reviewed all pertinent imaging results/findings within the past 24 hours.

## 2023-01-23 NOTE — PLAN OF CARE
Pt AAOx4. SBA. NS 75/hr. Orthostatic BP. VS stable. Cr 1.6. WBC WDL today - 9.83. K 3.4. Cdif precautions back up. Afebrile currently. IV antibiotics hung.

## 2023-01-24 LAB
ALBUMIN SERPL BCP-MCNC: 3 G/DL (ref 3.5–5.2)
ALP SERPL-CCNC: 119 U/L (ref 55–135)
ALT SERPL W/O P-5'-P-CCNC: 22 U/L (ref 10–44)
AMYLASE SERPL-CCNC: 41 U/L (ref 20–110)
ANION GAP SERPL CALC-SCNC: 11 MMOL/L (ref 8–16)
AST SERPL-CCNC: 18 U/L (ref 10–40)
BASOPHILS # BLD AUTO: 0.02 K/UL (ref 0–0.2)
BASOPHILS NFR BLD: 0.3 % (ref 0–1.9)
BILIRUB SERPL-MCNC: 0.5 MG/DL (ref 0.1–1)
BUN SERPL-MCNC: 15 MG/DL (ref 6–20)
CALCIUM SERPL-MCNC: 10.2 MG/DL (ref 8.7–10.5)
CHLORIDE SERPL-SCNC: 105 MMOL/L (ref 95–110)
CO2 SERPL-SCNC: 21 MMOL/L (ref 23–29)
CREAT SERPL-MCNC: 1.3 MG/DL (ref 0.5–1.4)
DIFFERENTIAL METHOD: ABNORMAL
EOSINOPHIL # BLD AUTO: 0 K/UL (ref 0–0.5)
EOSINOPHIL NFR BLD: 0.6 % (ref 0–8)
ERYTHROCYTE [DISTWIDTH] IN BLOOD BY AUTOMATED COUNT: 19.5 % (ref 11.5–14.5)
EST. GFR  (NO RACE VARIABLE): 57.8 ML/MIN/1.73 M^2
GLUCOSE SERPL-MCNC: 93 MG/DL (ref 70–110)
HCT VFR BLD AUTO: 36.6 % (ref 37–48.5)
HGB BLD-MCNC: 11.6 G/DL (ref 12–16)
IMM GRANULOCYTES # BLD AUTO: 0.04 K/UL (ref 0–0.04)
IMM GRANULOCYTES NFR BLD AUTO: 0.6 % (ref 0–0.5)
LIPASE SERPL-CCNC: 11 U/L (ref 4–60)
LYMPHOCYTES # BLD AUTO: 0.3 K/UL (ref 1–4.8)
LYMPHOCYTES NFR BLD: 4.4 % (ref 18–48)
MAGNESIUM SERPL-MCNC: 1.9 MG/DL (ref 1.6–2.6)
MCH RBC QN AUTO: 31.2 PG (ref 27–31)
MCHC RBC AUTO-ENTMCNC: 31.7 G/DL (ref 32–36)
MCV RBC AUTO: 98 FL (ref 82–98)
MONOCYTES # BLD AUTO: 0.7 K/UL (ref 0.3–1)
MONOCYTES NFR BLD: 10.7 % (ref 4–15)
NEUTROPHILS # BLD AUTO: 5.5 K/UL (ref 1.8–7.7)
NEUTROPHILS NFR BLD: 83.4 % (ref 38–73)
NRBC BLD-RTO: 0 /100 WBC
PHOSPHATE SERPL-MCNC: 2.5 MG/DL (ref 2.7–4.5)
PLATELET # BLD AUTO: 319 K/UL (ref 150–450)
PMV BLD AUTO: 11 FL (ref 9.2–12.9)
POTASSIUM SERPL-SCNC: 3.4 MMOL/L (ref 3.5–5.1)
PROT SERPL-MCNC: 7.9 G/DL (ref 6–8.4)
RBC # BLD AUTO: 3.72 M/UL (ref 4–5.4)
SODIUM SERPL-SCNC: 137 MMOL/L (ref 136–145)
TACROLIMUS BLD-MCNC: 10.1 NG/ML (ref 5–15)
WBC # BLD AUTO: 6.62 K/UL (ref 3.9–12.7)

## 2023-01-24 PROCEDURE — 63600175 PHARM REV CODE 636 W HCPCS: Performed by: STUDENT IN AN ORGANIZED HEALTH CARE EDUCATION/TRAINING PROGRAM

## 2023-01-24 PROCEDURE — 25000003 PHARM REV CODE 250: Performed by: PHYSICIAN ASSISTANT

## 2023-01-24 PROCEDURE — 83735 ASSAY OF MAGNESIUM: CPT | Performed by: STUDENT IN AN ORGANIZED HEALTH CARE EDUCATION/TRAINING PROGRAM

## 2023-01-24 PROCEDURE — 25000003 PHARM REV CODE 250: Performed by: STUDENT IN AN ORGANIZED HEALTH CARE EDUCATION/TRAINING PROGRAM

## 2023-01-24 PROCEDURE — 25000003 PHARM REV CODE 250: Performed by: INTERNAL MEDICINE

## 2023-01-24 PROCEDURE — 82150 ASSAY OF AMYLASE: CPT | Performed by: STUDENT IN AN ORGANIZED HEALTH CARE EDUCATION/TRAINING PROGRAM

## 2023-01-24 PROCEDURE — 85025 COMPLETE CBC W/AUTO DIFF WBC: CPT | Performed by: STUDENT IN AN ORGANIZED HEALTH CARE EDUCATION/TRAINING PROGRAM

## 2023-01-24 PROCEDURE — 80197 ASSAY OF TACROLIMUS: CPT | Performed by: STUDENT IN AN ORGANIZED HEALTH CARE EDUCATION/TRAINING PROGRAM

## 2023-01-24 PROCEDURE — 83690 ASSAY OF LIPASE: CPT | Performed by: STUDENT IN AN ORGANIZED HEALTH CARE EDUCATION/TRAINING PROGRAM

## 2023-01-24 PROCEDURE — 80053 COMPREHEN METABOLIC PANEL: CPT | Performed by: STUDENT IN AN ORGANIZED HEALTH CARE EDUCATION/TRAINING PROGRAM

## 2023-01-24 PROCEDURE — 63600175 PHARM REV CODE 636 W HCPCS: Performed by: INTERNAL MEDICINE

## 2023-01-24 PROCEDURE — 36415 COLL VENOUS BLD VENIPUNCTURE: CPT | Performed by: STUDENT IN AN ORGANIZED HEALTH CARE EDUCATION/TRAINING PROGRAM

## 2023-01-24 PROCEDURE — 20600001 HC STEP DOWN PRIVATE ROOM

## 2023-01-24 PROCEDURE — 63600175 PHARM REV CODE 636 W HCPCS: Performed by: PHYSICIAN ASSISTANT

## 2023-01-24 PROCEDURE — 84100 ASSAY OF PHOSPHORUS: CPT | Performed by: STUDENT IN AN ORGANIZED HEALTH CARE EDUCATION/TRAINING PROGRAM

## 2023-01-24 RX ORDER — TACROLIMUS 1 MG/1
6 CAPSULE ORAL 2 TIMES DAILY
Status: DISCONTINUED | OUTPATIENT
Start: 2023-01-24 | End: 2023-01-25

## 2023-01-24 RX ORDER — CIPROFLOXACIN 500 MG/1
500 TABLET ORAL EVERY 12 HOURS
Qty: 24 TABLET | Refills: 0 | Status: SHIPPED | OUTPATIENT
Start: 2023-01-24 | End: 2023-02-12

## 2023-01-24 RX ORDER — CIPROFLOXACIN 500 MG/1
500 TABLET ORAL EVERY 12 HOURS
Status: DISCONTINUED | OUTPATIENT
Start: 2023-01-24 | End: 2023-01-25 | Stop reason: HOSPADM

## 2023-01-24 RX ORDER — VALGANCICLOVIR 450 MG/1
900 TABLET, FILM COATED ORAL DAILY
Status: DISCONTINUED | OUTPATIENT
Start: 2023-01-25 | End: 2023-01-25 | Stop reason: HOSPADM

## 2023-01-24 RX ADMIN — THERA TABS 1 TABLET: TAB at 08:01

## 2023-01-24 RX ADMIN — MUPIROCIN: 20 OINTMENT TOPICAL at 08:01

## 2023-01-24 RX ADMIN — TACROLIMUS 7 MG: 1 CAPSULE ORAL at 08:01

## 2023-01-24 RX ADMIN — MUPIROCIN: 20 OINTMENT TOPICAL at 07:01

## 2023-01-24 RX ADMIN — VALGANCICLOVIR 450 MG: 450 TABLET, FILM COATED ORAL at 08:01

## 2023-01-24 RX ADMIN — PIPERACILLIN SODIUM AND TAZOBACTAM SODIUM 4.5 G: 4; .5 INJECTION, POWDER, LYOPHILIZED, FOR SOLUTION INTRAVENOUS at 12:01

## 2023-01-24 RX ADMIN — CIPROFLOXACIN HYDROCHLORIDE 500 MG: 500 TABLET, FILM COATED ORAL at 01:01

## 2023-01-24 RX ADMIN — CIPROFLOXACIN HYDROCHLORIDE 500 MG: 500 TABLET, FILM COATED ORAL at 07:01

## 2023-01-24 RX ADMIN — ASPIRIN 81 MG: 81 TABLET, COATED ORAL at 08:01

## 2023-01-24 RX ADMIN — PREDNISONE 5 MG: 5 TABLET ORAL at 08:01

## 2023-01-24 RX ADMIN — VENLAFAXINE HYDROCHLORIDE 37.5 MG: 37.5 CAPSULE, EXTENDED RELEASE ORAL at 08:01

## 2023-01-24 RX ADMIN — FLUDROCORTISONE ACETATE 100 MCG: 0.1 TABLET ORAL at 01:01

## 2023-01-24 RX ADMIN — TACROLIMUS 6 MG: 1 CAPSULE ORAL at 06:01

## 2023-01-24 RX ADMIN — ATORVASTATIN CALCIUM 20 MG: 20 TABLET, FILM COATED ORAL at 07:01

## 2023-01-24 RX ADMIN — SODIUM BICARBONATE 1300 MG: 650 TABLET ORAL at 08:01

## 2023-01-24 RX ADMIN — PIPERACILLIN SODIUM AND TAZOBACTAM SODIUM 4.5 G: 4; .5 INJECTION, POWDER, LYOPHILIZED, FOR SOLUTION INTRAVENOUS at 08:01

## 2023-01-24 RX ADMIN — SODIUM BICARBONATE 1300 MG: 650 TABLET ORAL at 07:01

## 2023-01-24 NOTE — PROGRESS NOTES
[facility] - Transplant Stepdown  Kidney Transplant  Progress Note      Reason for Follow-up: Reassessment of Kidney, Pancreas Transplant - 11/3/2022  (#1) recipient and management of immunosuppression.    ORGAN:  RIGHT KIDNEY   Donor Type:  Donation after Brain Death   Donor CMV Status: Negative  Donor HBcAB:Negative  Donor HCV Status:Negative  Donor HBV KEE: Negative  Donor HCV KEE: Negative      Subjective:   History of Present Illness:  Mrs. Read is a 27 year old female with history of KP 11/2022 who presented to [facility] [place]/V/F. She reports not feeling well with chills on Thursday. Her chills improved with tylenol. Friday morning she was on the way to her orthodontist, she had nausea then vomited. She went home took her BP, noticed fever, had more nausea and vomiting then came to our ED.  Denies syncope, chest pain, difficulty breathing, abdominal pain, changes in bowel movements or urination, hematuria, hematochezia, melena  In ED hypotensive with T103. WBC elevated, sCr elevated, UA consistent with infection. Blood and urine cultures obtained. Given pip-tazo and KTS consulted for admission.     Ms. Read is a 27 y.o. year old female who is status post Kidney, Pancreas Transplant - 11/3/2022  (#1).  Her maintenance immunosuppression consists of:   Immunosuppressants (From admission, onward)      Start     Stop Route Frequency Ordered    01/24/23 1800  tacrolimus capsule 6 mg         -- Oral 2 times daily 01/24/23 0942            Hospital Course:  Patient admitted with fever, suspect urosepsis/pyelonephritis. Blood/urine cx positive with E coli. Repeat cx ngtd. On Zosyn transitioned to PO cipro to complete 14 day course (EOT 2/5/23) per ID recs. Has been afebrile for 24 hrs.  Kidney US with persistent mild hydro- dc placed 1/22, removed 1/24. Obtain PVR. Diarrhea improved. Denies abdominal pains. VSS. Tentative d/c tomorrow if stable on PO abx. Cont to monitor      Past Medical, Surgical, Family, and  Social History:   Unchanged from H&P.    Scheduled Meds:   aspirin  81 mg Oral Daily    atorvastatin  20 mg Oral QHS    ciprofloxacin HCl  500 mg Oral Q12H    ergocalciferol  50,000 Units Oral Q7 Days    heparin (porcine)  5,000 Units Subcutaneous Q8H    multivitamin  1 tablet Oral Daily    mupirocin   Nasal BID    predniSONE  5 mg Oral Daily    sodium bicarbonate  1,300 mg Oral BID    tacrolimus  6 mg Oral BID    valGANciclovir  450 mg Oral Daily    venlafaxine  37.5 mg Oral Daily     Continuous Infusions:  PRN Meds:acetaminophen, dextrose 10%, dextrose 10%, diphenhydrAMINE, fludrocortisone, glucagon (human recombinant), glucose, glucose, HYDROcodone-acetaminophen, morphine, naloxone, ondansetron, polyethylene glycol, prochlorperazine, sodium chloride 0.9%, traZODone    Intake/Output - Last 3 Shifts         01/22 0700 01/23 0659 01/23 0700 01/24 0659 01/24 0700 01/25 0659    P.O. 240 2245     I.V. (mL/kg) 432.9 (6.4)      IV Piggyback 300 488.9     Total Intake(mL/kg) 972.9 (14.5) 2733.9 (41.2)     Urine (mL/kg/hr) 2560 (1.6) 3380 (2.1) 750 (1.7)    Emesis/NG output 0      Stool  0     Total Output 2560 3380 750    Net -1587.1 -646.2 -750           Stool Occurrence  1 x     Emesis Occurrence 1 x               Review of Systems   Constitutional:  Positive for chills and fever. Negative for appetite change and fatigue.   HENT: Negative.     Eyes: Negative.    Respiratory: Negative.     Cardiovascular: Negative.  Negative for chest pain and leg swelling.   Gastrointestinal:  Positive for nausea. Negative for abdominal distention, abdominal pain, constipation, diarrhea and vomiting.   Endocrine: Negative.    Genitourinary: Negative.  Negative for decreased urine volume, difficulty urinating and dysuria.   Musculoskeletal: Negative.    Skin: Negative.    Neurological: Negative.  Negative for headaches.   Psychiatric/Behavioral: Negative.  Negative for confusion and decreased concentration.     Objective:  "    Vital Signs (Most Recent):  Temp: 99.1 °F (37.3 °C) (01/24/23 1236)  Pulse: 104 (01/24/23 1315)  Resp: 18 (01/24/23 1315)  BP: 119/71 (01/24/23 1315)  SpO2: 98 % (01/24/23 1236) Vital Signs (24h Range):  Temp:  [96.2 °F (35.7 °C)-99.2 °F (37.3 °C)] 99.1 °F (37.3 °C)  Pulse:  [] 104  Resp:  [18] 18  SpO2:  [95 %-100 %] 98 %  BP: ()/() 119/71     Weight: 66.4 kg (146 lb 6.4 oz)  Height: 5' 4" (162.6 cm)  Body mass index is 25.13 kg/m².    Physical Exam  Vitals and nursing note reviewed.   Constitutional:       General: She is not in acute distress.  Eyes:      General: No scleral icterus.  Cardiovascular:      Rate and Rhythm: Tachycardia present.   Pulmonary:      Effort: Pulmonary effort is normal. No respiratory distress.   Abdominal:      General: Bowel sounds are normal. There is no distension.      Palpations: Abdomen is soft.      Tenderness: There is no abdominal tenderness.   Musculoskeletal:      Right lower leg: No edema.      Left lower leg: No edema.   Skin:     Coloration: Skin is not jaundiced.   Neurological:      Mental Status: She is alert.   Psychiatric:         Mood and Affect: Mood normal.         Behavior: Behavior normal.         Thought Content: Thought content normal.         Judgment: Judgment normal.       Laboratory:  CBC:   Recent Labs   Lab 01/22/23  0722 01/23/23  1204 01/24/23  0609   WBC 13.17* 9.83 6.62   RBC 3.44* 3.20* 3.72*   HGB 11.1* 9.9* 11.6*   HCT 34.4* 31.1* 36.6*    271 319   * 97 98   MCH 32.3* 30.9 31.2*   MCHC 32.3 31.8* 31.7*     CMP:   Recent Labs   Lab 01/22/23  0722 01/23/23  0708 01/24/23  0609    91 93   CALCIUM 8.0* 9.1 10.2   ALBUMIN 2.7* 2.7* 3.0*   PROT 6.4 7.0 7.9    136 137   K 3.2* 3.4* 3.4*   CO2 28 17* 21*   CL 99 107 105   BUN 22* 18 15   CREATININE 1.7* 1.6* 1.3   ALKPHOS 103 116 119   ALT 18 18 22   AST 15 17 18     Labs within the past 24 hours have been reviewed.    Diagnostic " "Results:  None    Assessment/Plan:     * Sepsis due to Escherichia coli  - admitted with hypotension and fever  - blood/urine cx +E coli  - remains on Zosyn- transition to PO cipro to complete 14d course (end: )  - ID consulted for abx management.   - repeat blood cs - ngtd      Bacteremia due to Escherichia coli  - see "sespsis 2/2 e coli"      Pyelonephritis, acute  Last pyelo 222 with enterococcus  UA with >100 WBC moderate bacteria  See severe sepsis for details    Severe sepsis  T103, , WBC 18, GEORGE  Most likely secondary to pyelonephritis  UC and BC with ecoli  CT scan with kidney fat stranding, mild hydro  Kidney US reviewed  On pip-tazo; stopped vanco; transitioned to PO cipro       Long-term use of immunosuppressant medication  At home on tac , /500, pred 5  Continue home tac and pred  MMF on hold for pyelo      Prophylactic immunotherapy  Continue immunosuppression as per problem "Long-term use of immunosuppressant medication"      At risk for opportunistic infections  - Continue Valcyte for CMV prophylaxis        Status post -donor simultaneous pancreas kidney transplantation  27 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 OR without specific notation. sCr decreased to 1 post op  Admit 2022 with hydro and pyelo with enterococcus. Kidney large and takes up much of pelvis  Now BL sCr 0.8-1.1    (HFpEF) heart failure with preserved ejection fraction  EF 55 from 2022  Monitor fluid status      Acute kidney injury superimposed on chronic kidney disease  Most likely from pyelonephritis  See severe sepsis for details      Secondary hyperparathyroidism  On ergo      Recurrent major depressive disorder, in partial remission  Continue home venlafaxine    Anemia in ESRD (end-stage renal disease)  Monitor       Hyperlipidemia  Atorvastatin while in hospital  Resume rosuvastatin on discharge    Vitamin D deficiency  Continue home " ergo        Discharge Planning: tentative d/c tomorrow     Shanika Martinez PA-C  Kidney Transplant  [facility] - Transplant Stepdown

## 2023-01-24 NOTE — PLAN OF CARE
27 year-old female admitted 1/21/23 for N/V, Fever, UTI. Pt has hx kidney/pancreas 11/3/22, DM1, HLD, Anemia, HTN, Rt eye retinal detachment  -AAOx4, ambulates independently  -18 G Rt Hand  -Moncada removed at bedside  -fecal incontinence   -BM loose, not diarrhea/ C. Diff discontinued  -Zosyn IVPB discontinued  -Cipro PO added to regimen  -repeat blood cultures NGTD  -PRN Florinef, standing SBP <90  -Tele NSR-sinus tach  -standing BP  -pt sitting up in bed, bed in lowest position, wheels locked, non-skid socks in place, call light within reach

## 2023-01-24 NOTE — ASSESSMENT & PLAN NOTE
T103, , WBC 18, GEORGE  Most likely secondary to pyelonephritis  UC and BC with ecoli  CT scan with kidney fat stranding, mild hydro  Kidney US reviewed  On pip-tazo; stopped vanco; transitioned to PO cipro 1/24

## 2023-01-24 NOTE — PLAN OF CARE
AAOx4. VSS. Afebrile. Shift T max 98.5. Orthostatic BP assessment indicated, see VSS flowsheets.Telemetry monitoring in place, NSR-ST . Fall precautions maintained. Call light within reach. Pt verbalized understanding to call nurse as needed. See flowsheets for assessment findings.

## 2023-01-24 NOTE — SUBJECTIVE & OBJECTIVE
Subjective:   History of Present Illness:  Mrs. Read is a 27 year old female with history of KP 11/2022 who presented to [facility] ED for N/V/F. She reports not feeling well with chills on Thursday. Her chills improved with tylenol. Friday morning she was on the way to her orthodontist, she had nausea then vomited. She went home took her BP, noticed fever, had more nausea and vomiting then came to our ED.  Denies syncope, chest pain, difficulty breathing, abdominal pain, changes in bowel movements or urination, hematuria, hematochezia, melena  In ED hypotensive with T103. WBC elevated, sCr elevated, UA consistent with infection. Blood and urine cultures obtained. Given pip-tazo and KTS consulted for admission.     Ms. Read is a 27 y.o. year old female who is status post Kidney, Pancreas Transplant - 11/3/2022  (#1).  Her maintenance immunosuppression consists of:   Immunosuppressants (From admission, onward)      Start     Stop Route Frequency Ordered    01/24/23 1800  tacrolimus capsule 6 mg         -- Oral 2 times daily 01/24/23 0942            Hospital Course:  Patient admitted with fever, suspect urosepsis/pyelonephritis. Blood/urine cx positive with E coli. Repeat cx ngtd. On Zosyn transitioned to PO cipro to complete 14 day course (EOT 2/5/23) per ID recs. Has been afebrile for 24 hrs.  Kidney US with persistent mild hydro- dc placed 1/22, removed 1/24. Obtain PVR. Diarrhea improved. Denies abdominal pains. VSS. Tentative d/c tomorrow if stable on PO abx. Cont to monitor      Past Medical, Surgical, Family, and Social History:   Unchanged from H&P.    Scheduled Meds:   aspirin  81 mg Oral Daily    atorvastatin  20 mg Oral QHS    ciprofloxacin HCl  500 mg Oral Q12H    ergocalciferol  50,000 Units Oral Q7 Days    heparin (porcine)  5,000 Units Subcutaneous Q8H    multivitamin  1 tablet Oral Daily    mupirocin   Nasal BID    predniSONE  5 mg Oral Daily    sodium bicarbonate  1,300 mg Oral BID    tacrolimus  6  "mg Oral BID    valGANciclovir  450 mg Oral Daily    venlafaxine  37.5 mg Oral Daily     Continuous Infusions:  PRN Meds:acetaminophen, dextrose 10%, dextrose 10%, diphenhydrAMINE, fludrocortisone, glucagon (human recombinant), glucose, glucose, HYDROcodone-acetaminophen, morphine, naloxone, ondansetron, polyethylene glycol, prochlorperazine, sodium chloride 0.9%, traZODone    Intake/Output - Last 3 Shifts         01/22 0700 01/23 0659 01/23 0700 01/24 0659 01/24 0700 01/25 0659    P.O. 240 2245     I.V. (mL/kg) 432.9 (6.4)      IV Piggyback 300 488.9     Total Intake(mL/kg) 972.9 (14.5) 2733.9 (41.2)     Urine (mL/kg/hr) 2560 (1.6) 3380 (2.1) 750 (1.7)    Emesis/NG output 0      Stool  0     Total Output 2560 3380 750    Net -1587.1 -646.2 -750           Stool Occurrence  1 x     Emesis Occurrence 1 x               Review of Systems   Constitutional:  Positive for chills and fever. Negative for appetite change and fatigue.   HENT: Negative.     Eyes: Negative.    Respiratory: Negative.     Cardiovascular: Negative.  Negative for chest pain and leg swelling.   Gastrointestinal:  Positive for nausea. Negative for abdominal distention, abdominal pain, constipation, diarrhea and vomiting.   Endocrine: Negative.    Genitourinary: Negative.  Negative for decreased urine volume, difficulty urinating and dysuria.   Musculoskeletal: Negative.    Skin: Negative.    Neurological: Negative.  Negative for headaches.   Psychiatric/Behavioral: Negative.  Negative for confusion and decreased concentration.     Objective:     Vital Signs (Most Recent):  Temp: 99.1 °F (37.3 °C) (01/24/23 1236)  Pulse: 104 (01/24/23 1315)  Resp: 18 (01/24/23 1315)  BP: 119/71 (01/24/23 1315)  SpO2: 98 % (01/24/23 1236) Vital Signs (24h Range):  Temp:  [96.2 °F (35.7 °C)-99.2 °F (37.3 °C)] 99.1 °F (37.3 °C)  Pulse:  [] 104  Resp:  [18] 18  SpO2:  [95 %-100 %] 98 %  BP: ()/() 119/71     Weight: 66.4 kg (146 lb 6.4 oz)  Height: 5' 4" " (162.6 cm)  Body mass index is 25.13 kg/m².    Physical Exam  Vitals and nursing note reviewed.   Constitutional:       General: She is not in acute distress.  Eyes:      General: No scleral icterus.  Cardiovascular:      Rate and Rhythm: Tachycardia present.   Pulmonary:      Effort: Pulmonary effort is normal. No respiratory distress.   Abdominal:      General: Bowel sounds are normal. There is no distension.      Palpations: Abdomen is soft.      Tenderness: There is no abdominal tenderness.   Musculoskeletal:      Right lower leg: No edema.      Left lower leg: No edema.   Skin:     Coloration: Skin is not jaundiced.   Neurological:      Mental Status: She is alert.   Psychiatric:         Mood and Affect: Mood normal.         Behavior: Behavior normal.         Thought Content: Thought content normal.         Judgment: Judgment normal.       Laboratory:  CBC:   Recent Labs   Lab 01/22/23  0722 01/23/23  1204 01/24/23  0609   WBC 13.17* 9.83 6.62   RBC 3.44* 3.20* 3.72*   HGB 11.1* 9.9* 11.6*   HCT 34.4* 31.1* 36.6*    271 319   * 97 98   MCH 32.3* 30.9 31.2*   MCHC 32.3 31.8* 31.7*     CMP:   Recent Labs   Lab 01/22/23  0722 01/23/23  0708 01/24/23  0609    91 93   CALCIUM 8.0* 9.1 10.2   ALBUMIN 2.7* 2.7* 3.0*   PROT 6.4 7.0 7.9    136 137   K 3.2* 3.4* 3.4*   CO2 28 17* 21*   CL 99 107 105   BUN 22* 18 15   CREATININE 1.7* 1.6* 1.3   ALKPHOS 103 116 119   ALT 18 18 22   AST 15 17 18     Labs within the past 24 hours have been reviewed.    Diagnostic Results:  None

## 2023-01-24 NOTE — CARE UPDATE
"RAPID RESPONSE NURSE CHART REVIEW       Chart Reviewed: 01/24/2023, 1:10 PM    MRN: [id number]  Bed: 56237/58881 A    Dx: Sepsis due to Escherichia coli    Isabela Read has a past medical history of Acute cystitis without hematuria, Acute kidney injury superimposed on chronic kidney disease, Anemia, Bilious vomiting with nausea, Chronic hypertension with exacerbation during pregnancy in second trimester, Chronic kidney disease, Depression, Diabetes mellitus, Diabetic retinopathy, Diarrhea, Encounter for blood transfusion, End stage renal failure on dialysis, Fever, Fever of undetermined origin, Gastroparesis, Glaucoma, Hepatomegaly, History of diabetes mellitus, type I, psychiatric care, Hyperlipidemia, Hypertension, Nausea and vomiting, Nephrotic syndrome, Nephrotic syndrome, Nonspecific reaction to tuberculin skin test without active tuberculosis, Palpitations, Poor fetal growth affecting management of mother in second trimester, Pyelonephritis, acute, Restrictive lung disease, Retinal detachment, Sepsis, Sepsis, Severe pre-eclampsia in second trimester, SIRS (systemic inflammatory response syndrome), and Type 1 diabetes mellitus with end-stage renal disease (ESRD).    Last VS: BP (!) 89/55 (BP Location: Right arm, Patient Position: Standing)   Pulse (!) 123   Temp 99.1 °F (37.3 °C) (Oral)   Resp 18   Ht 5' 4" (1.626 m)   Wt 66.4 kg (146 lb 6.4 oz)   LMP  (LMP Unknown)   SpO2 98%   Breastfeeding No   BMI 25.13 kg/m²     24H Vital Sign Range:  Temp:  [96.2 °F (35.7 °C)-99.2 °F (37.3 °C)]   Pulse:  []   Resp:  [18]   BP: ()/()   SpO2:  [95 %-100 %]     Level of Consciousness (AVPU): alert    Recent Labs     01/22/23  0722 01/23/23  1204 01/24/23  0609   WBC 13.17* 9.83 6.62   HGB 11.1* 9.9* 11.6*   HCT 34.4* 31.1* 36.6*    271 319       Recent Labs     01/22/23  0722 01/23/23  0708 01/24/23  0609    136 137   K 3.2* 3.4* 3.4*   CL 99 107 105   CO2 28 17* 21* "   CREATININE 1.7* 1.6* 1.3    91 93   PHOS 2.7 2.8 2.5*   MG 1.7 1.9 1.9        No results for input(s): PH, PCO2, PO2, HCO3, POCSATURATED, BE in the last 72 hours.     OXYGEN:             MEWS score: 3    bedside RNJoseph  contacted for new hypotension.  No concerns verbalized at this time. Instructed to call [phone] for further concerns or assistance.    Sarmad Franklin RN

## 2023-01-24 NOTE — ASSESSMENT & PLAN NOTE
- admitted with hypotension and fever  - blood/urine cx +E coli  - remains on Zosyn- transition to PO cipro to complete 14d course (end: 2/5)  - ID consulted for abx management.   - repeat blood cs 1/23- ngtd

## 2023-01-25 VITALS
OXYGEN SATURATION: 98 % | WEIGHT: 141.69 LBS | RESPIRATION RATE: 18 BRPM | SYSTOLIC BLOOD PRESSURE: 105 MMHG | DIASTOLIC BLOOD PRESSURE: 58 MMHG | HEIGHT: 64 IN | HEART RATE: 117 BPM | BODY MASS INDEX: 24.19 KG/M2 | TEMPERATURE: 98 F

## 2023-01-25 PROBLEM — I95.1 ORTHOSTATIC HYPOTENSION: Status: ACTIVE | Noted: 2023-01-25

## 2023-01-25 LAB
ALBUMIN SERPL BCP-MCNC: 2.7 G/DL (ref 3.5–5.2)
ALP SERPL-CCNC: 97 U/L (ref 55–135)
ALT SERPL W/O P-5'-P-CCNC: 21 U/L (ref 10–44)
AMYLASE SERPL-CCNC: 44 U/L (ref 20–110)
ANION GAP SERPL CALC-SCNC: 8 MMOL/L (ref 8–16)
AST SERPL-CCNC: 16 U/L (ref 10–40)
BASOPHILS # BLD AUTO: 0.02 K/UL (ref 0–0.2)
BASOPHILS NFR BLD: 0.4 % (ref 0–1.9)
BILIRUB SERPL-MCNC: 0.2 MG/DL (ref 0.1–1)
BUN SERPL-MCNC: 15 MG/DL (ref 6–20)
CALCIUM SERPL-MCNC: 9.7 MG/DL (ref 8.7–10.5)
CHLORIDE SERPL-SCNC: 106 MMOL/L (ref 95–110)
CO2 SERPL-SCNC: 20 MMOL/L (ref 23–29)
CREAT SERPL-MCNC: 1.3 MG/DL (ref 0.5–1.4)
DIFFERENTIAL METHOD: ABNORMAL
EOSINOPHIL # BLD AUTO: 0.1 K/UL (ref 0–0.5)
EOSINOPHIL NFR BLD: 1.2 % (ref 0–8)
ERYTHROCYTE [DISTWIDTH] IN BLOOD BY AUTOMATED COUNT: 19.6 % (ref 11.5–14.5)
EST. GFR  (NO RACE VARIABLE): 57.8 ML/MIN/1.73 M^2
GLUCOSE SERPL-MCNC: 96 MG/DL (ref 70–110)
HCT VFR BLD AUTO: 34.5 % (ref 37–48.5)
HGB BLD-MCNC: 10.9 G/DL (ref 12–16)
IMM GRANULOCYTES # BLD AUTO: 0.06 K/UL (ref 0–0.04)
IMM GRANULOCYTES NFR BLD AUTO: 1.2 % (ref 0–0.5)
LIPASE SERPL-CCNC: 10 U/L (ref 4–60)
LYMPHOCYTES # BLD AUTO: 0.3 K/UL (ref 1–4.8)
LYMPHOCYTES NFR BLD: 6.1 % (ref 18–48)
MAGNESIUM SERPL-MCNC: 1.7 MG/DL (ref 1.6–2.6)
MCH RBC QN AUTO: 31.2 PG (ref 27–31)
MCHC RBC AUTO-ENTMCNC: 31.6 G/DL (ref 32–36)
MCV RBC AUTO: 99 FL (ref 82–98)
MONOCYTES # BLD AUTO: 1 K/UL (ref 0.3–1)
MONOCYTES NFR BLD: 19.2 % (ref 4–15)
NEUTROPHILS # BLD AUTO: 3.7 K/UL (ref 1.8–7.7)
NEUTROPHILS NFR BLD: 71.9 % (ref 38–73)
NRBC BLD-RTO: 0 /100 WBC
PHOSPHATE SERPL-MCNC: 2.9 MG/DL (ref 2.7–4.5)
PLATELET # BLD AUTO: 294 K/UL (ref 150–450)
PMV BLD AUTO: 11.1 FL (ref 9.2–12.9)
POTASSIUM SERPL-SCNC: 3.3 MMOL/L (ref 3.5–5.1)
PROT SERPL-MCNC: 7 G/DL (ref 6–8.4)
RBC # BLD AUTO: 3.49 M/UL (ref 4–5.4)
SODIUM SERPL-SCNC: 134 MMOL/L (ref 136–145)
TACROLIMUS BLD-MCNC: 13.4 NG/ML (ref 5–15)
WBC # BLD AUTO: 5.11 K/UL (ref 3.9–12.7)

## 2023-01-25 PROCEDURE — 36415 COLL VENOUS BLD VENIPUNCTURE: CPT | Performed by: STUDENT IN AN ORGANIZED HEALTH CARE EDUCATION/TRAINING PROGRAM

## 2023-01-25 PROCEDURE — 25000003 PHARM REV CODE 250: Performed by: STUDENT IN AN ORGANIZED HEALTH CARE EDUCATION/TRAINING PROGRAM

## 2023-01-25 PROCEDURE — 85025 COMPLETE CBC W/AUTO DIFF WBC: CPT | Performed by: STUDENT IN AN ORGANIZED HEALTH CARE EDUCATION/TRAINING PROGRAM

## 2023-01-25 PROCEDURE — 83735 ASSAY OF MAGNESIUM: CPT | Performed by: STUDENT IN AN ORGANIZED HEALTH CARE EDUCATION/TRAINING PROGRAM

## 2023-01-25 PROCEDURE — 82150 ASSAY OF AMYLASE: CPT | Performed by: STUDENT IN AN ORGANIZED HEALTH CARE EDUCATION/TRAINING PROGRAM

## 2023-01-25 PROCEDURE — 80053 COMPREHEN METABOLIC PANEL: CPT | Performed by: STUDENT IN AN ORGANIZED HEALTH CARE EDUCATION/TRAINING PROGRAM

## 2023-01-25 PROCEDURE — 63600175 PHARM REV CODE 636 W HCPCS: Performed by: PHYSICIAN ASSISTANT

## 2023-01-25 PROCEDURE — 83690 ASSAY OF LIPASE: CPT | Performed by: STUDENT IN AN ORGANIZED HEALTH CARE EDUCATION/TRAINING PROGRAM

## 2023-01-25 PROCEDURE — 80197 ASSAY OF TACROLIMUS: CPT | Performed by: STUDENT IN AN ORGANIZED HEALTH CARE EDUCATION/TRAINING PROGRAM

## 2023-01-25 PROCEDURE — 63600175 PHARM REV CODE 636 W HCPCS: Performed by: STUDENT IN AN ORGANIZED HEALTH CARE EDUCATION/TRAINING PROGRAM

## 2023-01-25 PROCEDURE — 99239 HOSP IP/OBS DSCHRG MGMT >30: CPT | Mod: ,,, | Performed by: PHYSICIAN ASSISTANT

## 2023-01-25 PROCEDURE — 84100 ASSAY OF PHOSPHORUS: CPT | Performed by: STUDENT IN AN ORGANIZED HEALTH CARE EDUCATION/TRAINING PROGRAM

## 2023-01-25 PROCEDURE — 25000003 PHARM REV CODE 250: Performed by: PHYSICIAN ASSISTANT

## 2023-01-25 PROCEDURE — 99239 PR HOSPITAL DISCHARGE DAY,>30 MIN: ICD-10-PCS | Mod: ,,, | Performed by: PHYSICIAN ASSISTANT

## 2023-01-25 RX ORDER — SODIUM BICARBONATE 650 MG/1
1300 TABLET ORAL 2 TIMES DAILY
Qty: 120 TABLET | Refills: 11 | Status: ON HOLD | OUTPATIENT
Start: 2023-01-25 | End: 2023-05-30 | Stop reason: SDUPTHER

## 2023-01-25 RX ORDER — SULFAMETHOXAZOLE AND TRIMETHOPRIM 400; 80 MG/1; MG/1
1 TABLET ORAL DAILY
Qty: 30 TABLET | Refills: 3 | Status: SHIPPED | OUTPATIENT
Start: 2023-01-25 | End: 2023-05-15 | Stop reason: ALTCHOICE

## 2023-01-25 RX ORDER — MIDODRINE HYDROCHLORIDE 5 MG/1
5 TABLET ORAL 3 TIMES DAILY
Qty: 90 TABLET | Refills: 11 | Status: SHIPPED | OUTPATIENT
Start: 2023-01-25 | End: 2023-05-15 | Stop reason: ALTCHOICE

## 2023-01-25 RX ORDER — POTASSIUM CHLORIDE 20 MEQ/1
40 TABLET, EXTENDED RELEASE ORAL ONCE
Status: COMPLETED | OUTPATIENT
Start: 2023-01-25 | End: 2023-01-25

## 2023-01-25 RX ORDER — TACROLIMUS 1 MG/1
4 CAPSULE ORAL 2 TIMES DAILY
Status: DISCONTINUED | OUTPATIENT
Start: 2023-01-25 | End: 2023-01-25 | Stop reason: HOSPADM

## 2023-01-25 RX ORDER — MYCOPHENOLATE MOFETIL 250 MG/1
500 CAPSULE ORAL 2 TIMES DAILY
Qty: 120 CAPSULE | Refills: 11 | Status: SHIPPED | OUTPATIENT
Start: 2023-01-25 | End: 2023-06-08

## 2023-01-25 RX ORDER — MIDODRINE HYDROCHLORIDE 5 MG/1
5 TABLET ORAL 3 TIMES DAILY
Status: DISCONTINUED | OUTPATIENT
Start: 2023-01-25 | End: 2023-01-25 | Stop reason: HOSPADM

## 2023-01-25 RX ADMIN — TACROLIMUS 6 MG: 1 CAPSULE ORAL at 08:01

## 2023-01-25 RX ADMIN — VALGANCICLOVIR 900 MG: 450 TABLET, FILM COATED ORAL at 08:01

## 2023-01-25 RX ADMIN — POTASSIUM CHLORIDE 40 MEQ: 1500 TABLET, EXTENDED RELEASE ORAL at 10:01

## 2023-01-25 RX ADMIN — THERA TABS 1 TABLET: TAB at 08:01

## 2023-01-25 RX ADMIN — FLUDROCORTISONE ACETATE 100 MCG: 0.1 TABLET ORAL at 12:01

## 2023-01-25 RX ADMIN — SODIUM BICARBONATE 1300 MG: 650 TABLET ORAL at 08:01

## 2023-01-25 RX ADMIN — CIPROFLOXACIN HYDROCHLORIDE 500 MG: 500 TABLET, FILM COATED ORAL at 08:01

## 2023-01-25 RX ADMIN — MUPIROCIN: 20 OINTMENT TOPICAL at 08:01

## 2023-01-25 RX ADMIN — ASPIRIN 81 MG: 81 TABLET, COATED ORAL at 08:01

## 2023-01-25 RX ADMIN — PREDNISONE 5 MG: 5 TABLET ORAL at 08:01

## 2023-01-25 RX ADMIN — VENLAFAXINE HYDROCHLORIDE 37.5 MG: 37.5 CAPSULE, EXTENDED RELEASE ORAL at 08:01

## 2023-01-25 NOTE — PLAN OF CARE
AAO x 4. SBP 88--PRN florinef administered, afebrile, SpO2>92% on RA. Telemetry monitoring SR. PO cipro Q12H continued. Fall precautions maintained and pt repositions self. See flowsheet for assessment findings. Possible d/c today.

## 2023-01-25 NOTE — PROGRESS NOTES
Discharge Medication Note:    Hospital Course: Isabela Read is a 27 y.o. female s/p   Donation after Brain Death  transplant on 11/3/2022 (Kidney / Pancreas) for Diabetes Mellitus - Type I.   Admitted with urosepsis (E coli); plan to complete therapy with Cipro until 2/5/2023; Resume MMF when Cipro is complete      Met with Isabela Read at discharge to review discharge medications and to update the blue medication card.           Medication List        START taking these medications      ciprofloxacin HCl 500 MG tablet  Commonly known as: CIPRO  Take 1 tablet (500 mg total) by mouth every 12 (twelve) hours. STOP 2/5/23 for 12 days     midodrine 5 MG Tab  Commonly known as: PROAMATINE  Take 1 tablet (5 mg total) by mouth 3 (three) times daily.     sulfamethoxazole-trimethoprim 400-80mg 400-80 mg per tablet  Commonly known as: BACTRIM,SEPTRA  Take 1 tablet by mouth once daily. Stop 5/2/2023            CHANGE how you take these medications      sodium bicarbonate 650 MG tablet  Take 2 tablets (1,300 mg total) by mouth 2 (two) times daily.  What changed:   how much to take  when to take this            CONTINUE taking these medications      aspirin 81 MG EC tablet  Commonly known as: ECOTRIN     multivitamin per tablet  Commonly known as: THERAGRAN  Take 1 tablet by mouth once daily.     mycophenolate 250 mg Cap  Commonly known as: CELLCEPT  Take 2 capsules (500 mg total) by mouth 2 (two) times daily.     ondansetron 8 MG Tbdl  Commonly known as: ZOFRAN-ODT  DISSOLVE 1 tablet (8 mg total) by mouth every 8 (eight) hours as needed (nausea).     predniSONE 5 MG tablet  Commonly known as: DELTASONE  Take 1 tablet (5 mg total) by mouth once daily.     promethazine 12.5 MG Tab  Commonly known as: PHENERGAN  Take 1 tablet (12.5 mg total) by mouth every 6 (six) hours as needed (nausea).     rosuvastatin 10 MG tablet  Commonly known as: CRESTOR  Take 1 tablet (10 mg total) by mouth every evening.      tacrolimus 1 MG Cap  Commonly known as: PROGRAF  Take 4 capsules (4 mg total) by mouth every morning AND 4 capsules (4 mg total) every evening.     valGANciclovir 450 mg Tab  Commonly known as: VALCYTE  Take 2 tablets (900 mg total) by mouth once daily. Stop 2/2/2023     venlafaxine 37.5 MG 24 hr capsule  Commonly known as: EFFEXOR XR  Take 1 capsule (37.5 mg total) by mouth once daily.     VITAMIN D2 50,000 unit Cap  Generic drug: ergocalciferol  Take 1 capsule (50,000 Units total) by mouth every 7 days.            STOP taking these medications      fludrocortisone 0.1 mg Tab  Commonly known as: FLORINEF     k phos di & mono-sod phos mono 250 mg Tab  Commonly known as: K-Phos-NeutraL     magnesium oxide 400 mg (241.3 mg magnesium) tablet  Commonly known as: MAG-OX               Where to Get Your Medications        These medications were sent to [facility] [address], [place] [address]      Hours: Mon-Fri 7a-7p, Sat-Sun 10a-4p Phone: [phone]   ciprofloxacin HCl 500 MG tablet  midodrine 5 MG Tab  mycophenolate 250 mg Cap  sodium bicarbonate 650 MG tablet  sulfamethoxazole-trimethoprim 400-80mg 400-80 mg per tablet            The following medications have been placed on HOLD and should be restarted in the outpatient setting (when appropriate): MMF starts 2/5/2023    Isabela Read verbalized understanding and had the opportunity to ask questions.

## 2023-01-25 NOTE — PROGRESS NOTES
Discharge instructions and education given to pt. Pt verbalized understanding. Reviewed medication changes, new medications, future appointments and medication compliance. Pt informed to use Care Coordinator or call 24/7 [facility] on-call nurse for any further questions or concerns. Reviewed worsening signs or symptoms of infection. Peripheral IV removed, catheter intact. Telemetry monitors removed. Personal items sent with pt. Pt waiting for wheelchair services to provide transport. Pharmacy came to bedside and provided updated blue card. Vitals signs stable at time of discharge.

## 2023-01-25 NOTE — CARE UPDATE
RAPID RESPONSE NURSE ROUND       Rounding completed with charge RN, Rachel for hypotension reports most likely orthostatic and possibly being discharged today. No additional concerns verbalized at this time. Instructed to call [phone] for further concerns or assistance.

## 2023-01-25 NOTE — PLAN OF CARE
27 year-old female admitted 1/21/23 for N/V, Fever, UTI. Pt has hx kidney/pancreas 11/3/22, DM1, HLD, Anemia, HTN, Rt eye retinal detachment  -AAOx4, ambulates independently  -18 G Rt Hand  -fecal incontinence   -Cipro PO  -repeat blood cultures NGTD  -PRN Florinef, standing SBP <90  -Tele NSR-sinus tach  -standing BP  -Afebrile  -pt sitting up in bed, bed in lowest position, wheels locked, non-skid socks in place, call light within reach  -pending discharge

## 2023-01-25 NOTE — PROGRESS NOTES
"Transplant Social Work Discharge Note:    Pt will discharge to patient's home under the care of Kiersten Garcia and Self, patient's mother, phone number [phone].       Patient reported that she was "excited" to return home today. Patient denied any current mental health concerns including anxiety and depression.     Patient agreed to contact transplant team with needs, questions, or concerns as they arise.       Pt aware of, involved in, and coping well with this discharge plan. Pt did not have any concerns with the discharge plan at this time. SW remains available at [phone].          Makayla Bess LMSW   "

## 2023-01-25 NOTE — DISCHARGE SUMMARY
[facility] - Transplant Stepdown  Kidney Transplant  Discharge Summary    Patient Name: Isabela Read  MRN: [id number]  Admission Date: 1/21/2023  Hospital Length of Stay: 4 days  Discharge Date and Time:  01/25/2023 11:05 AM  Attending Physician: Melani Mcintyre MD   Discharging Provider: Shanika Martinez PA-C  Primary Care Provider: Tavo Bhatia MD    HPI:   Mrs. Read is a 27 year old female with hx ESRD secondary to diabetic nephropathy I and HTN, now s/p SPK 11/3/22 (Thymo induction, CMV D-/R+). Post-op with uriniary retention and GEORGE requiring biopsy 12/8, showing suspcion for ABMR (IVIG #1 given 12/14). [place] [facility] [place]. She reports not feeling well with chills on Thursday. Her chills improved with tylenol. Friday morning she was on the way to her orthodontist, she had nausea then vomited. She went home took her BP, noticed fever, had more nausea and vomiting then came to our ED.  Denies syncope, chest pain, difficulty breathing, abdominal pain, changes in bowel movements or urination, hematuria, hematochezia, melena  In ED hypotensive with T103. WBC elevated, sCr elevated, UA consistent with infection. Blood and urine cultures obtained. Given pip-tazo and KTS consulted for admission.         * No surgery found *     Hospital Course:    Patient admitted with fever, suspect urosepsis/pyelonephritis. Blood/urine cx positive with E coli. Repeat cx ngtd. On Zosyn transitioned to PO cipro to complete 14 day course (EOT 2/5/23) per ID recs. Has been afebrile for > 24 hrs.  Kidney US with persistent mild hydro- dc placed 1/22, removed 1/24. PVR without retention. Diarrhea improved. Orthostatic hypotension- started on midodrine 5mg TID (hold for SBP >110). Pt to follow-up outpatient with urodynamic studies. Set to completed IVIG #2 on 1/26 from prev bx showing suspicion for ABMR.     Pt is stable and ready for discharge. Encouraged to continue fluid intake. She has no complaints.  She has met with PharmD and received education. She will follow up with labs on  and transplant clinic next week. Pt expressed understanding of discharge instructions and importance of follow-up.     Goals of Care Treatment Preferences:  Code Status: Full Code      Final Active Diagnoses:    Diagnosis Date Noted POA    PRINCIPAL PROBLEM:  Sepsis due to Escherichia coli [A41.51] 2023 Yes    Orthostatic hypotension [I95.1] 2023 Unknown    Bacteremia due to Escherichia coli [R78.81, B96.20] 2023 Yes    AVF (arteriovenous fistula) [I77.0] 2022 Yes    Pyelonephritis, acute [N10] 2022 Yes    Severe sepsis [A41.9, R65.20] 2022 Yes    Long-term use of immunosuppressant medication [Z79.60] 2022 Not Applicable    Status post -donor simultaneous pancreas kidney transplantation [Z94.0] 2022 Not Applicable    At risk for opportunistic infections [Z91.89] 2022 Yes    Prophylactic immunotherapy [Z29.8] 2022 Not Applicable    (HFpEF) heart failure with preserved ejection fraction [I50.30] 2022 Yes    Acute kidney injury superimposed on chronic kidney disease [N17.9, N18.9] 2021 Yes    Recurrent major depressive disorder, in partial remission [F33.41] 2021 Yes    Secondary hyperparathyroidism [N25.81] 2021 Yes    Anemia in ESRD (end-stage renal disease) [N18.6, D63.1] 2020 Yes    Hyperlipidemia [E78.5] 2019 Yes    Vitamin D deficiency [E55.9] 2019 Yes    Renovascular hypertension [I15.0] 2015 Yes      Problems Resolved During this Admission:    Diagnosis Date Noted Date Resolved POA    CMV (cytomegalovirus) infection [B25.9] 2022 Yes       Treatments: as above    Consults (From admission, onward)        Status Ordering Provider     Inpatient consult to Infectious Diseases  Once        Provider:  (Not yet assigned)    Completed MEETA ARRIAGA     Pharmacy to dose  Vancomycin consult  Once        Provider:  (Not yet assigned)   See [facility] for full Linked Orders Report.    Completed SADE ALVARADO, CANDIDA FUNEZ          Pending Diagnostic Studies:     None        Significant Diagnostic Studies: Labs:   CMP   Recent Labs   Lab 01/24/23  0609 01/25/23  0607    134*   K 3.4* 3.3*    106   CO2 21* 20*   GLU 93 96   BUN 15 15   CREATININE 1.3 1.3   CALCIUM 10.2 9.7   PROT 7.9 7.0   ALBUMIN 3.0* 2.7*   BILITOT 0.5 0.2   ALKPHOS 119 97   AST 18 16   ALT 22 21   ANIONGAP 11 8   , CBC   Recent Labs   Lab 01/23/23  1204 01/24/23  0609 01/25/23  0607   WBC 9.83 6.62 5.11   HGB 9.9* 11.6* 10.9*   HCT 31.1* 36.6* 34.5*    319 294    and All labs within the past 24 hours have been reviewed    Discharged Condition: good    Disposition: Home or Self Care    Follow Up:    Patient Instructions:      Diet Adult Regular     Notify your health care provider if you experience any of the following:  temperature >100.4     Notify your health care provider if you experience any of the following:  persistent nausea and vomiting or diarrhea     Notify your health care provider if you experience any of the following:  severe uncontrolled pain     Notify your health care provider if you experience any of the following:  redness, tenderness, or signs of infection (pain, swelling, redness, odor or green/yellow discharge around incision site)     Notify your health care provider if you experience any of the following:  difficulty breathing or increased cough     Notify your health care provider if you experience any of the following:  severe persistent headache     Notify your health care provider if you experience any of the following:  worsening rash     Notify your health care provider if you experience any of the following:  persistent dizziness, light-headedness, or visual disturbances     Notify your health care provider if you experience any of the following:  increased confusion or  weakness     Activity as tolerated     Medications:  Reconciled Home Medications:      Medication List      START taking these medications    ciprofloxacin HCl 500 MG tablet  Commonly known as: CIPRO  Take 1 tablet (500 mg total) by mouth every 12 (twelve) hours. STOP 2/5/23 for 12 days     midodrine 5 MG Tab  Commonly known as: PROAMATINE  Take 1 tablet (5 mg total) by mouth 3 (three) times daily.     sulfamethoxazole-trimethoprim 400-80mg 400-80 mg per tablet  Commonly known as: BACTRIM,SEPTRA  Take 1 tablet by mouth once daily. Stop 5/2/2023        CHANGE how you take these medications    sodium bicarbonate 650 MG tablet  Take 2 tablets (1,300 mg total) by mouth 2 (two) times daily.  What changed:   · how much to take  · when to take this        CONTINUE taking these medications    aspirin 81 MG EC tablet  Commonly known as: ECOTRIN  Take 81 mg by mouth once daily.     multivitamin per tablet  Commonly known as: THERAGRAN  Take 1 tablet by mouth once daily.     mycophenolate 250 mg Cap  Commonly known as: CELLCEPT  Take 2 capsules (500 mg total) by mouth 2 (two) times daily.     ondansetron 8 MG Tbdl  Commonly known as: ZOFRAN-ODT  DISSOLVE 1 tablet (8 mg total) by mouth every 8 (eight) hours as needed (nausea).     predniSONE 5 MG tablet  Commonly known as: DELTASONE  Take 1 tablet (5 mg total) by mouth once daily.     promethazine 12.5 MG Tab  Commonly known as: PHENERGAN  Take 1 tablet (12.5 mg total) by mouth every 6 (six) hours as needed (nausea).     rosuvastatin 10 MG tablet  Commonly known as: CRESTOR  Take 1 tablet (10 mg total) by mouth every evening.     tacrolimus 1 MG Cap  Commonly known as: PROGRAF  Take 4 capsules (4 mg total) by mouth every morning AND 4 capsules (4 mg total) every evening.     valGANciclovir 450 mg Tab  Commonly known as: VALCYTE  Take 2 tablets (900 mg total) by mouth once daily. Stop 2/2/2023     venlafaxine 37.5 MG 24 hr capsule  Commonly known as: EFFEXOR XR  Take 1 capsule  (37.5 mg total) by mouth once daily.     VITAMIN D2 50,000 unit Cap  Generic drug: ergocalciferol  Take 1 capsule (50,000 Units total) by mouth every 7 days.        STOP taking these medications    fludrocortisone 0.1 mg Tab  Commonly known as: FLORINEF     k phos di & mono-sod phos mono 250 mg Tab  Commonly known as: K-Phos-NeutraL     magnesium oxide 400 mg (241.3 mg magnesium) tablet  Commonly known as: MAG-OX          Time spent caring for patient (Greater than 1/2 spent in direct face-to-face contact): > 30 minutes    Shanika Martinez PA-C  Kidney Transplant  [facility] - Transplant Stepdown

## 2023-01-28 LAB
BACTERIA BLD CULT: NORMAL
BACTERIA BLD CULT: NORMAL

## 2023-01-30 ENCOUNTER — PATIENT MESSAGE (OUTPATIENT)
Dept: OBSTETRICS AND GYNECOLOGY | Facility: CLINIC | Age: 28
End: 2023-01-30
Payer: MEDICARE

## 2023-01-30 ENCOUNTER — PATIENT MESSAGE (OUTPATIENT)
Dept: TRANSPLANT | Facility: CLINIC | Age: 28
End: 2023-01-30
Payer: MEDICARE

## 2023-01-31 ENCOUNTER — LAB VISIT (OUTPATIENT)
Dept: LAB | Facility: HOSPITAL | Age: 28
End: 2023-01-31
Attending: INTERNAL MEDICINE
Payer: MEDICARE

## 2023-01-31 DIAGNOSIS — Z94.83 STATUS POST SIMULTANEOUS KIDNEY AND PANCREAS TRANSPLANT: ICD-10-CM

## 2023-01-31 DIAGNOSIS — Z94.83 PANCREAS REPLACED BY TRANSPLANT: ICD-10-CM

## 2023-01-31 DIAGNOSIS — Z94.0 KIDNEY REPLACED BY TRANSPLANT: ICD-10-CM

## 2023-01-31 DIAGNOSIS — Z94.0 STATUS POST SIMULTANEOUS KIDNEY AND PANCREAS TRANSPLANT: ICD-10-CM

## 2023-01-31 LAB
ALBUMIN SERPL BCP-MCNC: 3.2 G/DL (ref 3.5–5.2)
AMYLASE SERPL-CCNC: 114 U/L (ref 20–110)
ANION GAP SERPL CALC-SCNC: 7 MMOL/L (ref 8–16)
ANISOCYTOSIS BLD QL SMEAR: SLIGHT
BASOPHILS # BLD AUTO: 0.07 K/UL (ref 0–0.2)
BASOPHILS NFR BLD: 0.9 % (ref 0–1.9)
BUN SERPL-MCNC: 21 MG/DL (ref 6–20)
CALCIUM SERPL-MCNC: 9.8 MG/DL (ref 8.7–10.5)
CHLORIDE SERPL-SCNC: 109 MMOL/L (ref 95–110)
CO2 SERPL-SCNC: 24 MMOL/L (ref 23–29)
CREAT SERPL-MCNC: 1.2 MG/DL (ref 0.5–1.4)
DIFFERENTIAL METHOD: ABNORMAL
EOSINOPHIL # BLD AUTO: 0.1 K/UL (ref 0–0.5)
EOSINOPHIL NFR BLD: 1.4 % (ref 0–8)
ERYTHROCYTE [DISTWIDTH] IN BLOOD BY AUTOMATED COUNT: 19.1 % (ref 11.5–14.5)
EST. GFR  (NO RACE VARIABLE): >60 ML/MIN/1.73 M^2
GLUCOSE SERPL-MCNC: 78 MG/DL (ref 70–110)
HCT VFR BLD AUTO: 36.4 % (ref 37–48.5)
HGB BLD-MCNC: 11.6 G/DL (ref 12–16)
HYPOCHROMIA BLD QL SMEAR: ABNORMAL
IMM GRANULOCYTES # BLD AUTO: 0.23 K/UL (ref 0–0.04)
IMM GRANULOCYTES NFR BLD AUTO: 2.9 % (ref 0–0.5)
LIPASE SERPL-CCNC: 18 U/L (ref 4–60)
LYMPHOCYTES # BLD AUTO: 1 K/UL (ref 1–4.8)
LYMPHOCYTES NFR BLD: 12.2 % (ref 18–48)
MAGNESIUM SERPL-MCNC: 1.4 MG/DL (ref 1.6–2.6)
MCH RBC QN AUTO: 32.4 PG (ref 27–31)
MCHC RBC AUTO-ENTMCNC: 31.9 G/DL (ref 32–36)
MCV RBC AUTO: 102 FL (ref 82–98)
MONOCYTES # BLD AUTO: 0.3 K/UL (ref 0.3–1)
MONOCYTES NFR BLD: 3.9 % (ref 4–15)
NEUTROPHILS # BLD AUTO: 6.3 K/UL (ref 1.8–7.7)
NEUTROPHILS NFR BLD: 78.7 % (ref 38–73)
NRBC BLD-RTO: 0 /100 WBC
OVALOCYTES BLD QL SMEAR: ABNORMAL
PHOSPHATE SERPL-MCNC: 2.8 MG/DL (ref 2.7–4.5)
PLATELET # BLD AUTO: 727 K/UL (ref 150–450)
PMV BLD AUTO: 9.6 FL (ref 9.2–12.9)
POIKILOCYTOSIS BLD QL SMEAR: SLIGHT
POLYCHROMASIA BLD QL SMEAR: ABNORMAL
POTASSIUM SERPL-SCNC: 3.7 MMOL/L (ref 3.5–5.1)
RBC # BLD AUTO: 3.58 M/UL (ref 4–5.4)
SODIUM SERPL-SCNC: 140 MMOL/L (ref 136–145)
SPHEROCYTES BLD QL SMEAR: ABNORMAL
TACROLIMUS BLD-MCNC: 7.8 NG/ML (ref 5–15)
WBC # BLD AUTO: 7.95 K/UL (ref 3.9–12.7)

## 2023-01-31 PROCEDURE — 83690 ASSAY OF LIPASE: CPT | Performed by: INTERNAL MEDICINE

## 2023-01-31 PROCEDURE — 85025 COMPLETE CBC W/AUTO DIFF WBC: CPT | Performed by: INTERNAL MEDICINE

## 2023-01-31 PROCEDURE — 80069 RENAL FUNCTION PANEL: CPT | Performed by: INTERNAL MEDICINE

## 2023-01-31 PROCEDURE — 80197 ASSAY OF TACROLIMUS: CPT | Performed by: INTERNAL MEDICINE

## 2023-01-31 PROCEDURE — 36415 COLL VENOUS BLD VENIPUNCTURE: CPT | Performed by: INTERNAL MEDICINE

## 2023-01-31 PROCEDURE — 83735 ASSAY OF MAGNESIUM: CPT | Performed by: INTERNAL MEDICINE

## 2023-01-31 PROCEDURE — 82150 ASSAY OF AMYLASE: CPT | Performed by: INTERNAL MEDICINE

## 2023-01-31 NOTE — TELEPHONE ENCOUNTER
Patient verbalizes understanding of dosage change. States she was unable to make it to IVIG infusion and needs to reschedule it. Will send message to infusion center to have pt rescheduled.     ----- Message from Tammie Broussard DO sent at 1/31/2023  1:11 PM CST -----  Increase FK to 5/4 given K/P status and currently off of MMF

## 2023-02-01 ENCOUNTER — OFFICE VISIT (OUTPATIENT)
Dept: TRANSPLANT | Facility: CLINIC | Age: 28
End: 2023-02-01
Payer: MEDICARE

## 2023-02-01 VITALS
HEIGHT: 64 IN | HEART RATE: 113 BPM | DIASTOLIC BLOOD PRESSURE: 80 MMHG | SYSTOLIC BLOOD PRESSURE: 145 MMHG | TEMPERATURE: 97 F | WEIGHT: 135.13 LBS | RESPIRATION RATE: 20 BRPM | BODY MASS INDEX: 23.07 KG/M2

## 2023-02-01 DIAGNOSIS — E87.20 METABOLIC ACIDOSIS: ICD-10-CM

## 2023-02-01 DIAGNOSIS — E83.42 HYPOMAGNESEMIA: ICD-10-CM

## 2023-02-01 DIAGNOSIS — Z94.0 S/P KIDNEY TRANSPLANT: Primary | ICD-10-CM

## 2023-02-01 DIAGNOSIS — Z91.89 AT RISK FOR OPPORTUNISTIC INFECTIONS: ICD-10-CM

## 2023-02-01 PROCEDURE — 3079F PR MOST RECENT DIASTOLIC BLOOD PRESSURE 80-89 MM HG: ICD-10-PCS | Mod: CPTII,S$GLB,, | Performed by: INTERNAL MEDICINE

## 2023-02-01 PROCEDURE — 3077F SYST BP >= 140 MM HG: CPT | Mod: CPTII,S$GLB,, | Performed by: INTERNAL MEDICINE

## 2023-02-01 PROCEDURE — 1111F DSCHRG MED/CURRENT MED MERGE: CPT | Mod: CPTII,S$GLB,, | Performed by: INTERNAL MEDICINE

## 2023-02-01 PROCEDURE — 99999 PR PBB SHADOW E&M-EST. PATIENT-LVL III: CPT | Mod: PBBFAC,,, | Performed by: INTERNAL MEDICINE

## 2023-02-01 PROCEDURE — 99999 PR PBB SHADOW E&M-EST. PATIENT-LVL III: ICD-10-PCS | Mod: PBBFAC,,, | Performed by: INTERNAL MEDICINE

## 2023-02-01 PROCEDURE — 99215 PR OFFICE/OUTPT VISIT, EST, LEVL V, 40-54 MIN: ICD-10-PCS | Mod: S$GLB,,, | Performed by: INTERNAL MEDICINE

## 2023-02-01 PROCEDURE — 1111F PR DISCHARGE MEDS RECONCILED W/ CURRENT OUTPATIENT MED LIST: ICD-10-PCS | Mod: CPTII,S$GLB,, | Performed by: INTERNAL MEDICINE

## 2023-02-01 PROCEDURE — 3008F BODY MASS INDEX DOCD: CPT | Mod: CPTII,S$GLB,, | Performed by: INTERNAL MEDICINE

## 2023-02-01 PROCEDURE — 3079F DIAST BP 80-89 MM HG: CPT | Mod: CPTII,S$GLB,, | Performed by: INTERNAL MEDICINE

## 2023-02-01 PROCEDURE — 3066F PR DOCUMENTATION OF TREATMENT FOR NEPHROPATHY: ICD-10-PCS | Mod: CPTII,S$GLB,, | Performed by: INTERNAL MEDICINE

## 2023-02-01 PROCEDURE — 99215 OFFICE O/P EST HI 40 MIN: CPT | Mod: S$GLB,,, | Performed by: INTERNAL MEDICINE

## 2023-02-01 PROCEDURE — 3066F NEPHROPATHY DOC TX: CPT | Mod: CPTII,S$GLB,, | Performed by: INTERNAL MEDICINE

## 2023-02-01 PROCEDURE — 3077F PR MOST RECENT SYSTOLIC BLOOD PRESSURE >= 140 MM HG: ICD-10-PCS | Mod: CPTII,S$GLB,, | Performed by: INTERNAL MEDICINE

## 2023-02-01 PROCEDURE — 3008F PR BODY MASS INDEX (BMI) DOCUMENTED: ICD-10-PCS | Mod: CPTII,S$GLB,, | Performed by: INTERNAL MEDICINE

## 2023-02-01 RX ORDER — TACROLIMUS 1 MG/1
CAPSULE ORAL
Qty: 270 CAPSULE | Refills: 11 | Status: SHIPPED | OUTPATIENT
Start: 2023-02-01 | End: 2023-02-09 | Stop reason: SDUPTHER

## 2023-02-01 NOTE — PROGRESS NOTES
Kidney/Pancreas Post-Transplant Assessment    Referring Physician: Oswald Kruger  Current Nephrologist: Tai Camilo    ORGAN: PANCREAS  Donor Type: donation after brain death  PHS Increased Risk: no  Cold Ischemia: 310 mins  Induction Medications: Thymo    Subjective:     CC:  Reassessment of renal allograft function and management of chronic immunosuppression.    HPI:  Ms. Read is a 27 y.o. year old Black or  female who received a donation after brain death kidney and pancreas transplant on 11/3/22. Since the last visit, patient was admitted to the hospital for nausea/vomiting and fever. Found to have E coli bacteremia with urinary retention. Moncada removed prior to discharge with patient on cipro until 22.    Patient seen today in clinic alone. Patient states that she felt fatigue the day of discharge of but has since been feeling better. States that she has not had any fever, HA, runny nose, watery eyes. Denies any cough, SOB or CP. Denies any abdominal pain. Denies any diarrhea, dysuria or frequency.     Required midodrine on initial days after discharge but states that SBP has now been running over 110 (standing)      DONOR INFORMATION:   18 yr WM without any HTN, DM or CA.  of anoxia. DBD. CIT of 5 hr and 29 min. KDPI of 1 %    Review of Systems   Constitutional:  Negative for activity change, appetite change, chills and fever.   HENT:  Negative for congestion, sneezing and sore throat.    Eyes:  Negative for pain and itching.   Respiratory:  Negative for apnea, cough, shortness of breath and wheezing.    Cardiovascular:  Negative for chest pain.   Gastrointestinal:  Negative for abdominal pain, constipation, diarrhea, nausea and vomiting.   Genitourinary:  Negative for difficulty urinating, dysuria and frequency.   Musculoskeletal:  Negative for back pain, joint swelling and neck pain.   Skin:  Negative for color change.   Neurological:  Negative for dizziness,  "light-headedness and headaches.   Psychiatric/Behavioral:  Negative for behavioral problems and confusion. The patient is not nervous/anxious.      Objective:   Blood pressure (!) 145/80, pulse (!) 113, temperature 97.3 °F (36.3 °C), temperature source Oral, resp. rate 20, height 5' 4" (1.626 m), weight 61.3 kg (135 lb 2.3 oz).body mass index is 23.2 kg/m².    Physical Exam  Vitals reviewed.   Constitutional:       General: She is not in acute distress.     Appearance: Normal appearance. She is not ill-appearing or toxic-appearing.      Comments: Appears stated age   HENT:      Head: Normocephalic and atraumatic.      Right Ear: External ear normal.      Left Ear: External ear normal.      Nose: Nose normal.   Eyes:      General: No scleral icterus.     Conjunctiva/sclera: Conjunctivae normal.   Cardiovascular:      Rate and Rhythm: Normal rate and regular rhythm.      Pulses: Normal pulses.      Heart sounds: Normal heart sounds. No murmur heard.    No friction rub.   Pulmonary:      Effort: Pulmonary effort is normal. No respiratory distress.      Breath sounds: Normal breath sounds. No wheezing or rhonchi.   Abdominal:      General: Bowel sounds are normal. There is no distension.      Palpations: Abdomen is soft.      Tenderness: There is no abdominal tenderness. There is no guarding.      Comments: Staples midline noted. Non tender   Musculoskeletal:         General: No swelling or deformity. Normal range of motion.      Cervical back: Normal range of motion and neck supple. No tenderness.      Right lower leg: No edema.      Left lower leg: No edema.   Skin:     General: Skin is warm and dry.      Coloration: Skin is not jaundiced.      Findings: No erythema or rash.   Neurological:      General: No focal deficit present.      Mental Status: She is alert and oriented to person, place, and time.      Motor: No weakness.   Psychiatric:         Mood and Affect: Mood normal.         Behavior: Behavior normal. "       Labs:  Lab Results   Component Value Date    WBC 7.95 01/31/2023    HGB 11.6 (L) 01/31/2023    HCT 36.4 (L) 01/31/2023     01/31/2023    K 3.7 01/31/2023     01/31/2023    CO2 24 01/31/2023    BUN 21 (H) 01/31/2023    CREATININE 1.2 01/31/2023    EGFRNONAA 9.1 (A) 05/25/2022    CALCIUM 9.8 01/31/2023    PHOS 2.8 01/31/2023    MG 1.4 (L) 01/31/2023    ALBUMIN 3.2 (L) 01/31/2023    AST 16 01/25/2023    ALT 21 01/25/2023    UTPCR 0.33 (H) 01/03/2023    .3 (H) 12/01/2022    TACROLIMUS 7.8 01/31/2023       No results found for: EXTANC, EXTWBC, EXTSEGS, EXTPLATELETS, EXTHEMOGLOBI, EXTHEMATOCRI, EXTCREATININ, EXTSODIUM, EXTPOTASSIUM, EXTBUN, EXTCO2, EXTCALCIUM, EXTPHOSPHORU, EXTGLUCOSE, EXTALBUMIN, EXTAST, EXTALT, EXTBILITOTAL, EXTLIPASE, EXTAMYLASE    No results found for: EXTCYCLOSLVL, EXTSIROLIMUS, EXTTACROLVL, EXTPROTCRE, EXTPTHINTACT, EXTPROTEINUA, EXTWBCUA, EXTRBCUA    Labs were reviewed with the patient    Assessment and Plan   89 yr old AAF with type I DM s/p a simultaneous kidney and pancreas transplant on 11/3/22. 0% PRA. Thymo induction. Post transplant course complicated by N/V/GEORGE with urinary retention s/p dc placement (s/p removal), recent CMV infection as well as early kidney biopsy concerning for ABMR with negative DSA- to be treated with IVIG                1) s/p simultaneous kidney and pancreas transplant:              - kidney and pancreas function at baseline              - has remaining dose of IVIG pending which she will get tomorrow   - recurrent UTI and will discuss with urology regarding cystoscopy as well as urodynamic studies   - on cipro until 2/5/22. Aware to resume MMF afterwards   - decrease FK with dose increased yesterday  - cont on prednisone 5 mg daily  - cont on bactrim and valcyte (to stop tomorrow)              - cont on ASA 81 mg daily  2) hypotension:              - hold metoprolol              - florinef 0.1 mg BID prn  3) Recurrent UTI:   - will  discuss with urology regarding cystoscopy as well as urodynamic studies  4) CMV infection:              - resolved.   5) Metabolic acidosis:              - cont on sodium bicarb 1300 TID  6) Hypophosphatemia:              - resolved   8) Hypomagnesmemia:              - monitor for now   9) hx of type I DM with retinopathy  10) Elevated LFT:              - followed by hepatology in the past with fibroscan performed concerning for fibrosis with MRI elastogram suspicious for iron overload     Labs bimonthly  RTC per protocol    Tammie Broussard DO  Transplant Nephrology

## 2023-02-01 NOTE — LETTER
February 1, 2023        Tai Camilo  [address]  [facility] [address]  Phone: [phone]  Fax: [phone]             [facility] 1st Fl  [address]  [facility] [address]  Phone: [phone]   Patient: Isabela Read   MR Number: [id number]   YOB: 1995   Date of Visit: 2/1/2023       Dear Dr. Tai Camilo    Thank you for referring Isabela Read to me for evaluation. Attached you will find relevant portions of my assessment and plan of care.    If you have questions, please do not hesitate to call me. I look forward to following Isabela Read along with you.    Sincerely,    Tammie Broussard, DO    Enclosure    If you would like to receive this communication electronically, please contact [email or web address] or [phone] to request [employer] access.    [employer] is a tool which provides read-only access to select patient information with whom you have a relationship. Its easy to use and provides real time access to review your patients record including encounter summaries, notes, results, and demographic information.    If you feel you have received this communication in error or would no longer like to receive these types of communications, please e-mail [email or web address]

## 2023-02-02 ENCOUNTER — INFUSION (OUTPATIENT)
Dept: INFECTIOUS DISEASES | Facility: HOSPITAL | Age: 28
End: 2023-02-02
Attending: INTERNAL MEDICINE
Payer: MEDICARE

## 2023-02-02 VITALS
OXYGEN SATURATION: 98 % | BODY MASS INDEX: 22.79 KG/M2 | WEIGHT: 133.5 LBS | RESPIRATION RATE: 18 BRPM | HEART RATE: 106 BPM | SYSTOLIC BLOOD PRESSURE: 112 MMHG | HEIGHT: 64 IN | TEMPERATURE: 96 F | DIASTOLIC BLOOD PRESSURE: 66 MMHG

## 2023-02-02 DIAGNOSIS — Z79.899 IMMUNOSUPPRESSIVE MANAGEMENT ENCOUNTER FOLLOWING KIDNEY TRANSPLANT: Primary | ICD-10-CM

## 2023-02-02 DIAGNOSIS — Z94.0 IMMUNOSUPPRESSIVE MANAGEMENT ENCOUNTER FOLLOWING KIDNEY TRANSPLANT: Primary | ICD-10-CM

## 2023-02-02 PROCEDURE — 96366 THER/PROPH/DIAG IV INF ADDON: CPT

## 2023-02-02 PROCEDURE — 25000003 PHARM REV CODE 250: Performed by: INTERNAL MEDICINE

## 2023-02-02 PROCEDURE — 96365 THER/PROPH/DIAG IV INF INIT: CPT

## 2023-02-02 PROCEDURE — 63600175 PHARM REV CODE 636 W HCPCS: Mod: JG | Performed by: INTERNAL MEDICINE

## 2023-02-02 RX ORDER — ACETAMINOPHEN 500 MG
500 TABLET ORAL ONCE
Status: CANCELLED | OUTPATIENT
Start: 2023-02-02 | End: 2023-02-02

## 2023-02-02 RX ORDER — EPINEPHRINE 0.3 MG/.3ML
0.3 INJECTION SUBCUTANEOUS ONCE AS NEEDED
Status: CANCELLED | OUTPATIENT
Start: 2023-03-15

## 2023-02-02 RX ORDER — DIPHENHYDRAMINE HCL 25 MG
25 CAPSULE ORAL ONCE
Status: CANCELLED | OUTPATIENT
Start: 2023-03-15 | End: 2023-03-15

## 2023-02-02 RX ORDER — ACETAMINOPHEN 500 MG
500 TABLET ORAL ONCE
Status: CANCELLED | OUTPATIENT
Start: 2023-03-15 | End: 2023-03-15

## 2023-02-02 RX ORDER — EPINEPHRINE 0.3 MG/.3ML
0.3 INJECTION SUBCUTANEOUS ONCE AS NEEDED
Status: DISCONTINUED | OUTPATIENT
Start: 2023-02-02 | End: 2023-02-02 | Stop reason: HOSPADM

## 2023-02-02 RX ORDER — SODIUM CHLORIDE 0.9 % (FLUSH) 0.9 %
10 SYRINGE (ML) INJECTION
OUTPATIENT
Start: 2023-03-15

## 2023-02-02 RX ORDER — DIPHENHYDRAMINE HYDROCHLORIDE 50 MG/ML
25 INJECTION INTRAMUSCULAR; INTRAVENOUS
Status: CANCELLED | OUTPATIENT
Start: 2023-03-15

## 2023-02-02 RX ORDER — DIPHENHYDRAMINE HYDROCHLORIDE 50 MG/ML
50 INJECTION INTRAMUSCULAR; INTRAVENOUS ONCE AS NEEDED
Status: CANCELLED | OUTPATIENT
Start: 2023-03-15

## 2023-02-02 RX ORDER — DIPHENHYDRAMINE HCL 25 MG
25 CAPSULE ORAL ONCE
Status: COMPLETED | OUTPATIENT
Start: 2023-02-02 | End: 2023-02-02

## 2023-02-02 RX ORDER — DIPHENHYDRAMINE HYDROCHLORIDE 50 MG/ML
25 INJECTION INTRAMUSCULAR; INTRAVENOUS
Status: DISCONTINUED | OUTPATIENT
Start: 2023-02-02 | End: 2023-02-02 | Stop reason: HOSPADM

## 2023-02-02 RX ORDER — HEPARIN 100 UNIT/ML
500 SYRINGE INTRAVENOUS
OUTPATIENT
Start: 2023-03-15

## 2023-02-02 RX ORDER — DIPHENHYDRAMINE HCL 25 MG
25 CAPSULE ORAL ONCE
Status: CANCELLED | OUTPATIENT
Start: 2023-02-02 | End: 2023-02-02

## 2023-02-02 RX ORDER — ACETAMINOPHEN 500 MG
500 TABLET ORAL ONCE
Status: COMPLETED | OUTPATIENT
Start: 2023-02-02 | End: 2023-02-02

## 2023-02-02 RX ORDER — DIPHENHYDRAMINE HYDROCHLORIDE 50 MG/ML
50 INJECTION INTRAMUSCULAR; INTRAVENOUS ONCE AS NEEDED
Status: DISCONTINUED | OUTPATIENT
Start: 2023-02-02 | End: 2023-02-02 | Stop reason: HOSPADM

## 2023-02-02 RX ADMIN — SODIUM CHLORIDE 250 ML: 9 INJECTION, SOLUTION INTRAVENOUS at 09:02

## 2023-02-02 RX ADMIN — HUMAN IMMUNOGLOBULIN G 55 G: 40 LIQUID INTRAVENOUS at 10:02

## 2023-02-02 RX ADMIN — ACETAMINOPHEN 500 MG: 500 TABLET ORAL at 09:02

## 2023-02-02 RX ADMIN — DIPHENHYDRAMINE HYDROCHLORIDE 25 MG: 25 CAPSULE ORAL at 09:02

## 2023-02-02 NOTE — PROGRESS NOTES
Pt received Privigen 55GM, infusion today, pre meds tylenol 500mg po and benadryl  25mg po, NS bolus 250ml, titration rates as follows per MAR are 18, 36, 72, 145, pt tolerated well & left in NAD

## 2023-02-06 ENCOUNTER — OFFICE VISIT (OUTPATIENT)
Dept: OPHTHALMOLOGY | Facility: CLINIC | Age: 28
End: 2023-02-06
Payer: MEDICARE

## 2023-02-06 DIAGNOSIS — E10.3592 TYPE 1 DIABETES MELLITUS WITH PROLIFERATIVE RETINOPATHY OF LEFT EYE WITHOUT MACULAR EDEMA: ICD-10-CM

## 2023-02-06 DIAGNOSIS — H27.01 APHAKIA, RIGHT: ICD-10-CM

## 2023-02-06 DIAGNOSIS — E10.3521 TYPE 1 DIABETES MELLITUS WITH RIGHT EYE AFFECTED BY PROLIFERATIVE RETINOPATHY AND TRACTION RETINAL DETACHMENT INVOLVING MACULA: Primary | ICD-10-CM

## 2023-02-06 PROCEDURE — 3066F NEPHROPATHY DOC TX: CPT | Mod: CPTII,S$GLB,, | Performed by: OPHTHALMOLOGY

## 2023-02-06 PROCEDURE — 3044F HG A1C LEVEL LT 7.0%: CPT | Mod: CPTII,S$GLB,, | Performed by: OPHTHALMOLOGY

## 2023-02-06 PROCEDURE — 3044F PR MOST RECENT HEMOGLOBIN A1C LEVEL <7.0%: ICD-10-PCS | Mod: CPTII,S$GLB,, | Performed by: OPHTHALMOLOGY

## 2023-02-06 PROCEDURE — 1159F PR MEDICATION LIST DOCUMENTED IN MEDICAL RECORD: ICD-10-PCS | Mod: CPTII,S$GLB,, | Performed by: OPHTHALMOLOGY

## 2023-02-06 PROCEDURE — 1111F PR DISCHARGE MEDS RECONCILED W/ CURRENT OUTPATIENT MED LIST: ICD-10-PCS | Mod: CPTII,S$GLB,, | Performed by: OPHTHALMOLOGY

## 2023-02-06 PROCEDURE — 99214 PR OFFICE/OUTPT VISIT, EST, LEVL IV, 30-39 MIN: ICD-10-PCS | Mod: S$GLB,,, | Performed by: OPHTHALMOLOGY

## 2023-02-06 PROCEDURE — 1160F PR REVIEW ALL MEDS BY PRESCRIBER/CLIN PHARMACIST DOCUMENTED: ICD-10-PCS | Mod: CPTII,S$GLB,, | Performed by: OPHTHALMOLOGY

## 2023-02-06 PROCEDURE — 99999 PR PBB SHADOW E&M-EST. PATIENT-LVL III: ICD-10-PCS | Mod: PBBFAC,,, | Performed by: OPHTHALMOLOGY

## 2023-02-06 PROCEDURE — 1111F DSCHRG MED/CURRENT MED MERGE: CPT | Mod: CPTII,S$GLB,, | Performed by: OPHTHALMOLOGY

## 2023-02-06 PROCEDURE — 92201 PR OPHTHALMOSCOPY, EXT, W/RET DRAW/SCLERAL DEPR, I&R, UNI/BI: ICD-10-PCS | Mod: S$GLB,,, | Performed by: OPHTHALMOLOGY

## 2023-02-06 PROCEDURE — 99999 PR PBB SHADOW E&M-EST. PATIENT-LVL III: CPT | Mod: PBBFAC,,, | Performed by: OPHTHALMOLOGY

## 2023-02-06 PROCEDURE — 1159F MED LIST DOCD IN RCRD: CPT | Mod: CPTII,S$GLB,, | Performed by: OPHTHALMOLOGY

## 2023-02-06 PROCEDURE — 92134 POSTERIOR SEGMENT OCT RETINA (OCULAR COHERENCE TOMOGRAPHY)-BOTH EYES: ICD-10-PCS | Mod: S$GLB,,, | Performed by: OPHTHALMOLOGY

## 2023-02-06 PROCEDURE — 92134 CPTRZ OPH DX IMG PST SGM RTA: CPT | Mod: S$GLB,,, | Performed by: OPHTHALMOLOGY

## 2023-02-06 PROCEDURE — 99214 OFFICE O/P EST MOD 30 MIN: CPT | Mod: S$GLB,,, | Performed by: OPHTHALMOLOGY

## 2023-02-06 PROCEDURE — 92201 OPSCPY EXTND RTA DRAW UNI/BI: CPT | Mod: S$GLB,,, | Performed by: OPHTHALMOLOGY

## 2023-02-06 PROCEDURE — 1160F RVW MEDS BY RX/DR IN RCRD: CPT | Mod: CPTII,S$GLB,, | Performed by: OPHTHALMOLOGY

## 2023-02-06 PROCEDURE — 3066F PR DOCUMENTATION OF TREATMENT FOR NEPHROPATHY: ICD-10-PCS | Mod: CPTII,S$GLB,, | Performed by: OPHTHALMOLOGY

## 2023-02-07 ENCOUNTER — LAB VISIT (OUTPATIENT)
Dept: LAB | Facility: HOSPITAL | Age: 28
End: 2023-02-07
Attending: INTERNAL MEDICINE
Payer: MEDICARE

## 2023-02-07 DIAGNOSIS — Z94.83 STATUS POST PANCREAS TRANSPLANTATION: ICD-10-CM

## 2023-02-07 DIAGNOSIS — E10.29 TYPE 1 DIABETES MELLITUS WITH OTHER DIABETIC KIDNEY COMPLICATION: ICD-10-CM

## 2023-02-07 DIAGNOSIS — Z94.0 KIDNEY REPLACED BY TRANSPLANT: ICD-10-CM

## 2023-02-07 LAB
ALBUMIN SERPL BCP-MCNC: 3.6 G/DL (ref 3.5–5.2)
ALBUMIN SERPL BCP-MCNC: 3.6 G/DL (ref 3.5–5.2)
ALP SERPL-CCNC: 107 U/L (ref 55–135)
ALT SERPL W/O P-5'-P-CCNC: 59 U/L (ref 10–44)
AMYLASE SERPL-CCNC: 131 U/L (ref 20–110)
ANION GAP SERPL CALC-SCNC: 8 MMOL/L (ref 8–16)
AST SERPL-CCNC: 34 U/L (ref 10–40)
BASOPHILS # BLD AUTO: 0.08 K/UL (ref 0–0.2)
BASOPHILS NFR BLD: 1.4 % (ref 0–1.9)
BILIRUB DIRECT SERPL-MCNC: 0.1 MG/DL (ref 0.1–0.3)
BILIRUB SERPL-MCNC: 0.2 MG/DL (ref 0.1–1)
BUN SERPL-MCNC: 28 MG/DL (ref 6–20)
CALCIUM SERPL-MCNC: 10.3 MG/DL (ref 8.7–10.5)
CHLORIDE SERPL-SCNC: 110 MMOL/L (ref 95–110)
CHOLEST SERPL-MCNC: 152 MG/DL (ref 120–199)
CHOLEST/HDLC SERPL: 2.9 {RATIO} (ref 2–5)
CO2 SERPL-SCNC: 19 MMOL/L (ref 23–29)
CREAT SERPL-MCNC: 1.4 MG/DL (ref 0.5–1.4)
DIFFERENTIAL METHOD: ABNORMAL
EOSINOPHIL # BLD AUTO: 0.1 K/UL (ref 0–0.5)
EOSINOPHIL NFR BLD: 1.2 % (ref 0–8)
ERYTHROCYTE [DISTWIDTH] IN BLOOD BY AUTOMATED COUNT: 19.6 % (ref 11.5–14.5)
EST. GFR  (NO RACE VARIABLE): 52.6 ML/MIN/1.73 M^2
ESTIMATED AVG GLUCOSE: 88 MG/DL (ref 68–131)
GLUCOSE SERPL-MCNC: 76 MG/DL (ref 70–110)
HBA1C MFR BLD: 4.7 % (ref 4–5.6)
HCT VFR BLD AUTO: 40.4 % (ref 37–48.5)
HDLC SERPL-MCNC: 52 MG/DL (ref 40–75)
HDLC SERPL: 34.2 % (ref 20–50)
HGB BLD-MCNC: 12.3 G/DL (ref 12–16)
IMM GRANULOCYTES # BLD AUTO: 0.03 K/UL (ref 0–0.04)
IMM GRANULOCYTES NFR BLD AUTO: 0.5 % (ref 0–0.5)
LDLC SERPL CALC-MCNC: 81.6 MG/DL (ref 63–159)
LIPASE SERPL-CCNC: 18 U/L (ref 4–60)
LYMPHOCYTES # BLD AUTO: 0.8 K/UL (ref 1–4.8)
LYMPHOCYTES NFR BLD: 13.2 % (ref 18–48)
MCH RBC QN AUTO: 31.8 PG (ref 27–31)
MCHC RBC AUTO-ENTMCNC: 30.4 G/DL (ref 32–36)
MCV RBC AUTO: 104 FL (ref 82–98)
MONOCYTES # BLD AUTO: 0.2 K/UL (ref 0.3–1)
MONOCYTES NFR BLD: 3.9 % (ref 4–15)
NEUTROPHILS # BLD AUTO: 4.7 K/UL (ref 1.8–7.7)
NEUTROPHILS NFR BLD: 79.8 % (ref 38–73)
NONHDLC SERPL-MCNC: 100 MG/DL
NRBC BLD-RTO: 0 /100 WBC
PHOSPHATE SERPL-MCNC: 3.4 MG/DL (ref 2.7–4.5)
PLATELET # BLD AUTO: 549 K/UL (ref 150–450)
PMV BLD AUTO: 10 FL (ref 9.2–12.9)
POTASSIUM SERPL-SCNC: 5.3 MMOL/L (ref 3.5–5.1)
PROT SERPL-MCNC: 8.8 G/DL (ref 6–8.4)
RBC # BLD AUTO: 3.87 M/UL (ref 4–5.4)
SODIUM SERPL-SCNC: 137 MMOL/L (ref 136–145)
TACROLIMUS BLD-MCNC: 11.1 NG/ML (ref 5–15)
TRIGL SERPL-MCNC: 92 MG/DL (ref 30–150)
WBC # BLD AUTO: 5.89 K/UL (ref 3.9–12.7)

## 2023-02-07 PROCEDURE — 84075 ASSAY ALKALINE PHOSPHATASE: CPT | Performed by: INTERNAL MEDICINE

## 2023-02-07 PROCEDURE — 83036 HEMOGLOBIN GLYCOSYLATED A1C: CPT | Performed by: INTERNAL MEDICINE

## 2023-02-07 PROCEDURE — 80061 LIPID PANEL: CPT | Performed by: INTERNAL MEDICINE

## 2023-02-07 PROCEDURE — 36415 COLL VENOUS BLD VENIPUNCTURE: CPT | Performed by: INTERNAL MEDICINE

## 2023-02-07 PROCEDURE — 82150 ASSAY OF AMYLASE: CPT | Performed by: INTERNAL MEDICINE

## 2023-02-07 PROCEDURE — 80197 ASSAY OF TACROLIMUS: CPT | Performed by: INTERNAL MEDICINE

## 2023-02-07 PROCEDURE — 80069 RENAL FUNCTION PANEL: CPT | Performed by: INTERNAL MEDICINE

## 2023-02-07 PROCEDURE — 83690 ASSAY OF LIPASE: CPT | Performed by: INTERNAL MEDICINE

## 2023-02-07 PROCEDURE — 85025 COMPLETE CBC W/AUTO DIFF WBC: CPT | Performed by: INTERNAL MEDICINE

## 2023-02-07 NOTE — PHYSICIAN QUERY
PT Name: Isabela Read  MR #: [id number]    DOCUMENTATION CLARIFICATION        CDS/: MATILDE HogueN, RN, CCDS               Contact information: [email or web address]  This form is a permanent document in the medical record.     Query Date: February 7, 2023    By submitting this query, we are merely seeking further clarification of documentation to reflect the severity of illness of your patient. Please utilize your independent clinical judgment when addressing the question(s) below.    The medical record reflects the following:     Indicators   Supporting Clinical Findings Location in Medical Record   X History of organ transplant history of kidney and pancreas transplant 3 months ago presents with fever, nausea, and vomiting   ED Note: Dr. Duenas 1/21   X Acute/chronic condition(s) Vitals with hypotension, tachycardia, and fever   Suspect pyelo of transplated kidney    Severe sepsis, CMV infection, acute pyelonephritis, GEORGE on CKD   ED Note: Dr. Duenas 1/21      H & P: Dr. ARELLANO Lab Value(s) Labs with elevated WBC and procal.  UA with UTI.    WBC: 18.99, 13.17, 9.83  Lactic acid: 2.2  Procal: 6.03    Blood/urine cx positive with E coli   ED Note: Dr. Duenas 1/21    Lab: 1/21, 1/22, 1/23 1/21 1/21    DCS: Dr. Broussard 1/25   X Radiology/US Findings Edematous appearance of the renal transplant noting transplant hydronephrosis.    Persistent mild hydronephrosis with echogenic material in the allograft collecting system, which may represent concentrated urine, blood products, or infectious material CT: 1/21    US: 1/22    Treatment/Medication     X Other E coli bacteremia and appreciate ID's assistance. Cipro until 2/5/23   can resume  BID after completing ABX   will need urodynamic studies and further work up for recurrent UTI as outpatient  previous kbiopsy concerning for ABMR. To receive IVIG tomorrow DCS: Dr. Broussard 1/25     Provider, please specify if the ___acute  pyelonephritis____________________________  :  [  X ] Affected the function of the transplanted organ, and is a complication   [   ] Did not affect the function of the transplanted organ, and is not a complication   [   ] Acute pyelonephritis is not in the transplanted kidney   [   ] Other explanation (please specify): _____________       Please document in your progress notes daily for the duration of treatment until resolved, and include in your discharge summary.

## 2023-02-08 ENCOUNTER — CLINICAL SUPPORT (OUTPATIENT)
Dept: OBSTETRICS AND GYNECOLOGY | Facility: CLINIC | Age: 28
End: 2023-02-08
Payer: MEDICARE

## 2023-02-08 ENCOUNTER — DOCUMENTATION ONLY (OUTPATIENT)
Dept: PHARMACY | Facility: CLINIC | Age: 28
End: 2023-02-08
Payer: MEDICARE

## 2023-02-08 ENCOUNTER — TELEPHONE (OUTPATIENT)
Dept: OPHTHALMOLOGY | Facility: CLINIC | Age: 28
End: 2023-02-08
Payer: MEDICARE

## 2023-02-08 DIAGNOSIS — E10.3521 TYPE 1 DIABETES MELLITUS WITH RIGHT EYE AFFECTED BY PROLIFERATIVE RETINOPATHY AND TRACTION RETINAL DETACHMENT INVOLVING MACULA: Primary | ICD-10-CM

## 2023-02-08 DIAGNOSIS — Z30.9 ENCOUNTER FOR CONTRACEPTIVE MANAGEMENT, UNSPECIFIED TYPE: Primary | ICD-10-CM

## 2023-02-08 LAB
B-HCG UR QL: NEGATIVE
CMV DNA SPEC QL NAA+PROBE: NOT DETECTED
CTP QC/QA: YES
CYTOMEGALOVIRUS LOG (IU/ML): NOT DETECTED LOGIU/ML
CYTOMEGALOVIRUS PCR, QUANT: NOT DETECTED IU/ML

## 2023-02-08 PROCEDURE — 81025 URINE PREGNANCY TEST: CPT | Mod: S$GLB,,, | Performed by: OBSTETRICS & GYNECOLOGY

## 2023-02-08 PROCEDURE — 99499 NO LOS: ICD-10-PCS | Mod: S$GLB,,, | Performed by: OBSTETRICS & GYNECOLOGY

## 2023-02-08 PROCEDURE — 81025 POCT URINE PREGNANCY: ICD-10-PCS | Mod: S$GLB,,, | Performed by: OBSTETRICS & GYNECOLOGY

## 2023-02-08 PROCEDURE — 99499 UNLISTED E&M SERVICE: CPT | Mod: S$GLB,,, | Performed by: OBSTETRICS & GYNECOLOGY

## 2023-02-08 RX ORDER — MEDROXYPROGESTERONE ACETATE 150 MG/ML
150 INJECTION, SUSPENSION INTRAMUSCULAR ONCE
Qty: 1 ML | Refills: 3 | Status: SHIPPED | OUTPATIENT
Start: 2023-02-08 | End: 2024-01-05 | Stop reason: SDUPTHER

## 2023-02-08 RX ORDER — MEDROXYPROGESTERONE ACETATE 150 MG/ML
150 INJECTION, SUSPENSION INTRAMUSCULAR ONCE
Qty: 1 ML | Refills: 0 | Status: CANCELLED | OUTPATIENT
Start: 2023-02-08 | End: 2023-02-08

## 2023-02-08 RX ORDER — MEDROXYPROGESTERONE ACETATE 150 MG/ML
150 INJECTION, SUSPENSION INTRAMUSCULAR ONCE
Qty: 1 ML | Refills: 3 | Status: CANCELLED | OUTPATIENT
Start: 2023-02-08 | End: 2023-02-08

## 2023-02-08 NOTE — PROGRESS NOTES
Subjective:       Patient ID: Isabela Read is a 28 y.o. female      Chief Complaint   Patient presents with    Diabetic Eye Exam     History of Present Illness  HPI    Pt states No Changes since last visit  Vision is poor OD, good OS  Denies flashes of light or floaters, no pain     Eye Meds: None     Had kidney and panc transplant. Overall doing well    POHx:   1. Type 1 DM OD w/ PDR and TRD involving macula  S/P PPV , Removal of silicone oil, injection of 14% C3F8 gas OD 02/01/2022  Last edited by Maximilian Montaño MD on 2/7/2023  9:08 PM.        Imaging:    See report    Assessment/Plan:     1. 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  Recommend reoperation    Visual potential unclear  Pt elects observation for now    RBA discussed in detail, inc surgical failure, need for more surgery, loss of vision/eye, no recovery of vision, surgical failure, bleeding, infection, high eye pressure (glaucoma), etc.  Pt wishes to proceed.    IOL master for aphakic with SO OD, MA60AC was done last visit    25 ga SOR, PPV, MP, EL likely gas replacement  GA    2. Type 1 diabetes mellitus with proliferative retinopathy of left eye without macular edema  Doing well  S/p PRP  Observe    Diabetic Retinopathy discussed in detail, all questions answered  Stressed importance of good BS/BP/Chol Control  RTC immediately PRN any vision changes, laure blurry vision, missing vision, floaters, distortions, etc    Follow up in about 3 weeks (around 2/27/2023), or if symptoms worsen or fail to improve, for Surgery.

## 2023-02-08 NOTE — PROGRESS NOTES
Isabela Read received IM Depo Provera to the right deltoid region on 2/8/2023.    The patient was instructed to get the next injection on 04/26/-05/10    Lot Number: [id number]    Expiration Date: 10/2024    Johann Mcgowan [facility]

## 2023-02-09 ENCOUNTER — PATIENT MESSAGE (OUTPATIENT)
Dept: TRANSPLANT | Facility: CLINIC | Age: 28
End: 2023-02-09
Payer: MEDICARE

## 2023-02-09 DIAGNOSIS — Z94.83 STATUS POST SIMULTANEOUS KIDNEY AND PANCREAS TRANSPLANT: ICD-10-CM

## 2023-02-09 DIAGNOSIS — Z94.0 STATUS POST SIMULTANEOUS KIDNEY AND PANCREAS TRANSPLANT: ICD-10-CM

## 2023-02-09 RX ORDER — TACROLIMUS 1 MG/1
CAPSULE ORAL
Qty: 270 CAPSULE | Refills: 11 | Status: ON HOLD | OUTPATIENT
Start: 2023-02-09 | End: 2023-05-30 | Stop reason: SDUPTHER

## 2023-02-09 NOTE — TELEPHONE ENCOUNTER
Patient verbalizes understanding of dosage change. Advised patient to review list of high potassium foods to avoid on page 100 of transplant book. Encouraged increased water intake. Will repeat labs next week.    ----- Message from Tammie Broussard DO sent at 2/9/2023  9:56 AM CST -----  Kidney graft function mildly elevated. Monitor for now  Low K diet.   High FK. Decrease FK to 4 mg BID  Repeat labs next week

## 2023-02-13 ENCOUNTER — LAB VISIT (OUTPATIENT)
Dept: LAB | Facility: HOSPITAL | Age: 28
End: 2023-02-13
Attending: INTERNAL MEDICINE
Payer: MEDICARE

## 2023-02-13 DIAGNOSIS — Z94.0 KIDNEY REPLACED BY TRANSPLANT: Primary | ICD-10-CM

## 2023-02-13 DIAGNOSIS — Z94.83 STATUS POST PANCREAS TRANSPLANTATION: ICD-10-CM

## 2023-02-13 DIAGNOSIS — Z94.83 STATUS POST PANCREAS TRANSPLANTATION: Primary | ICD-10-CM

## 2023-02-13 DIAGNOSIS — Z94.0 KIDNEY REPLACED BY TRANSPLANT: ICD-10-CM

## 2023-02-13 LAB
ALBUMIN SERPL BCP-MCNC: 3.7 G/DL (ref 3.5–5.2)
AMYLASE SERPL-CCNC: 155 U/L (ref 20–110)
ANION GAP SERPL CALC-SCNC: 6 MMOL/L (ref 8–16)
BASOPHILS # BLD AUTO: 0.07 K/UL (ref 0–0.2)
BASOPHILS NFR BLD: 1 % (ref 0–1.9)
BUN SERPL-MCNC: 32 MG/DL (ref 6–20)
CALCIUM SERPL-MCNC: 10 MG/DL (ref 8.7–10.5)
CHLORIDE SERPL-SCNC: 111 MMOL/L (ref 95–110)
CO2 SERPL-SCNC: 20 MMOL/L (ref 23–29)
CREAT SERPL-MCNC: 1 MG/DL (ref 0.5–1.4)
DIFFERENTIAL METHOD: ABNORMAL
EOSINOPHIL # BLD AUTO: 0.1 K/UL (ref 0–0.5)
EOSINOPHIL NFR BLD: 1.4 % (ref 0–8)
ERYTHROCYTE [DISTWIDTH] IN BLOOD BY AUTOMATED COUNT: 18.9 % (ref 11.5–14.5)
EST. GFR  (NO RACE VARIABLE): >60 ML/MIN/1.73 M^2
GLUCOSE SERPL-MCNC: 71 MG/DL (ref 70–110)
HCT VFR BLD AUTO: 41.4 % (ref 37–48.5)
HGB BLD-MCNC: 12.7 G/DL (ref 12–16)
IMM GRANULOCYTES # BLD AUTO: 0.02 K/UL (ref 0–0.04)
IMM GRANULOCYTES NFR BLD AUTO: 0.3 % (ref 0–0.5)
LIPASE SERPL-CCNC: 22 U/L (ref 4–60)
LYMPHOCYTES # BLD AUTO: 0.9 K/UL (ref 1–4.8)
LYMPHOCYTES NFR BLD: 12.8 % (ref 18–48)
MAGNESIUM SERPL-MCNC: 1.5 MG/DL (ref 1.6–2.6)
MCH RBC QN AUTO: 31.9 PG (ref 27–31)
MCHC RBC AUTO-ENTMCNC: 30.7 G/DL (ref 32–36)
MCV RBC AUTO: 104 FL (ref 82–98)
MONOCYTES # BLD AUTO: 1.2 K/UL (ref 0.3–1)
MONOCYTES NFR BLD: 16.6 % (ref 4–15)
NEUTROPHILS # BLD AUTO: 4.8 K/UL (ref 1.8–7.7)
NEUTROPHILS NFR BLD: 67.9 % (ref 38–73)
NRBC BLD-RTO: 0 /100 WBC
PHOSPHATE SERPL-MCNC: 3.5 MG/DL (ref 2.7–4.5)
PLATELET # BLD AUTO: 329 K/UL (ref 150–450)
PMV BLD AUTO: 11.6 FL (ref 9.2–12.9)
POTASSIUM SERPL-SCNC: 5.3 MMOL/L (ref 3.5–5.1)
RBC # BLD AUTO: 3.98 M/UL (ref 4–5.4)
SODIUM SERPL-SCNC: 137 MMOL/L (ref 136–145)
TACROLIMUS BLD-MCNC: 6.3 NG/ML (ref 5–15)
WBC # BLD AUTO: 7.03 K/UL (ref 3.9–12.7)

## 2023-02-13 PROCEDURE — 80197 ASSAY OF TACROLIMUS: CPT | Performed by: INTERNAL MEDICINE

## 2023-02-13 PROCEDURE — 83690 ASSAY OF LIPASE: CPT | Performed by: INTERNAL MEDICINE

## 2023-02-13 PROCEDURE — 83735 ASSAY OF MAGNESIUM: CPT | Performed by: INTERNAL MEDICINE

## 2023-02-13 PROCEDURE — 87799 DETECT AGENT NOS DNA QUANT: CPT | Performed by: INTERNAL MEDICINE

## 2023-02-13 PROCEDURE — 82150 ASSAY OF AMYLASE: CPT | Performed by: INTERNAL MEDICINE

## 2023-02-13 PROCEDURE — 85025 COMPLETE CBC W/AUTO DIFF WBC: CPT | Performed by: INTERNAL MEDICINE

## 2023-02-13 PROCEDURE — 80069 RENAL FUNCTION PANEL: CPT | Performed by: INTERNAL MEDICINE

## 2023-02-13 RX ORDER — TACROLIMUS 0.5 MG/1
0.5 CAPSULE ORAL EVERY 12 HOURS
Qty: 60 CAPSULE | Refills: 11 | Status: SHIPPED | OUTPATIENT
Start: 2023-02-13 | End: 2023-04-03 | Stop reason: DRUGHIGH

## 2023-02-13 NOTE — TELEPHONE ENCOUNTER
Patient verbalizes understanding of dosage change. Pancreas ultrasound scheduled for tomorrow. Patient confirmed correct dosage of sodium bicarbonate.     ----- Message from Tammie Broussard DO sent at 2/13/2023  2:45 PM CST -----  Baseline kidney graft function  Check US pancreas  Ensure that she is taking sodium bicarb supplements  Low FK. Increase FK to 4.5 mg BID (will need FK 0.5 mg tablets)

## 2023-02-14 ENCOUNTER — PATIENT MESSAGE (OUTPATIENT)
Dept: TRANSPLANT | Facility: CLINIC | Age: 28
End: 2023-02-14
Payer: MEDICARE

## 2023-02-14 ENCOUNTER — HOSPITAL ENCOUNTER (OUTPATIENT)
Dept: RADIOLOGY | Facility: HOSPITAL | Age: 28
Discharge: HOME | End: 2023-02-14
Attending: INTERNAL MEDICINE
Payer: MEDICARE

## 2023-02-14 DIAGNOSIS — Z94.83 STATUS POST PANCREAS TRANSPLANTATION: ICD-10-CM

## 2023-02-14 PROCEDURE — 93976 US DOPPLER PANCREAS TRANSPLANT POST (XPD): ICD-10-PCS | Mod: 26,,, | Performed by: STUDENT IN AN ORGANIZED HEALTH CARE EDUCATION/TRAINING PROGRAM

## 2023-02-14 PROCEDURE — 76705 ECHO EXAM OF ABDOMEN: CPT | Mod: 26,XS,, | Performed by: STUDENT IN AN ORGANIZED HEALTH CARE EDUCATION/TRAINING PROGRAM

## 2023-02-14 PROCEDURE — 93976 VASCULAR STUDY: CPT | Mod: TC

## 2023-02-14 PROCEDURE — 93976 VASCULAR STUDY: CPT | Mod: 26,,, | Performed by: STUDENT IN AN ORGANIZED HEALTH CARE EDUCATION/TRAINING PROGRAM

## 2023-02-14 PROCEDURE — 76705 US DOPPLER PANCREAS TRANSPLANT POST (XPD): ICD-10-PCS | Mod: 26,XS,, | Performed by: STUDENT IN AN ORGANIZED HEALTH CARE EDUCATION/TRAINING PROGRAM

## 2023-02-20 ENCOUNTER — PATIENT MESSAGE (OUTPATIENT)
Dept: TRANSPLANT | Facility: CLINIC | Age: 28
End: 2023-02-20
Payer: MEDICARE

## 2023-03-06 ENCOUNTER — LAB VISIT (OUTPATIENT)
Dept: LAB | Facility: HOSPITAL | Age: 28
End: 2023-03-06
Attending: INTERNAL MEDICINE
Payer: MEDICARE

## 2023-03-06 DIAGNOSIS — Z94.0 KIDNEY REPLACED BY TRANSPLANT: ICD-10-CM

## 2023-03-06 DIAGNOSIS — Z94.83 STATUS POST PANCREAS TRANSPLANTATION: ICD-10-CM

## 2023-03-06 LAB
ALBUMIN SERPL BCP-MCNC: 4.1 G/DL (ref 3.5–5.2)
AMYLASE SERPL-CCNC: 109 U/L (ref 20–110)
ANION GAP SERPL CALC-SCNC: 4 MMOL/L (ref 8–16)
BASOPHILS # BLD AUTO: 0.04 K/UL (ref 0–0.2)
BASOPHILS NFR BLD: 1 % (ref 0–1.9)
BUN SERPL-MCNC: 24 MG/DL (ref 6–20)
CALCIUM SERPL-MCNC: 10.2 MG/DL (ref 8.7–10.5)
CHLORIDE SERPL-SCNC: 112 MMOL/L (ref 95–110)
CO2 SERPL-SCNC: 22 MMOL/L (ref 23–29)
CREAT SERPL-MCNC: 0.9 MG/DL (ref 0.5–1.4)
DIFFERENTIAL METHOD: ABNORMAL
EOSINOPHIL # BLD AUTO: 0.1 K/UL (ref 0–0.5)
EOSINOPHIL NFR BLD: 2.6 % (ref 0–8)
ERYTHROCYTE [DISTWIDTH] IN BLOOD BY AUTOMATED COUNT: 15.3 % (ref 11.5–14.5)
EST. GFR  (NO RACE VARIABLE): >60 ML/MIN/1.73 M^2
GLUCOSE SERPL-MCNC: 81 MG/DL (ref 70–110)
HCT VFR BLD AUTO: 49.2 % (ref 37–48.5)
HGB BLD-MCNC: 14.9 G/DL (ref 12–16)
IMM GRANULOCYTES # BLD AUTO: 0.01 K/UL (ref 0–0.04)
IMM GRANULOCYTES NFR BLD AUTO: 0.3 % (ref 0–0.5)
LIPASE SERPL-CCNC: 17 U/L (ref 4–60)
LYMPHOCYTES # BLD AUTO: 1 K/UL (ref 1–4.8)
LYMPHOCYTES NFR BLD: 24.6 % (ref 18–48)
MCH RBC QN AUTO: 31.4 PG (ref 27–31)
MCHC RBC AUTO-ENTMCNC: 30.3 G/DL (ref 32–36)
MCV RBC AUTO: 104 FL (ref 82–98)
MONOCYTES # BLD AUTO: 0.5 K/UL (ref 0.3–1)
MONOCYTES NFR BLD: 13.5 % (ref 4–15)
NEUTROPHILS # BLD AUTO: 2.2 K/UL (ref 1.8–7.7)
NEUTROPHILS NFR BLD: 58 % (ref 38–73)
NRBC BLD-RTO: 0 /100 WBC
PHOSPHATE SERPL-MCNC: 3.4 MG/DL (ref 2.7–4.5)
PLATELET # BLD AUTO: 287 K/UL (ref 150–450)
PMV BLD AUTO: 9.9 FL (ref 9.2–12.9)
POTASSIUM SERPL-SCNC: 4.8 MMOL/L (ref 3.5–5.1)
RBC # BLD AUTO: 4.75 M/UL (ref 4–5.4)
SODIUM SERPL-SCNC: 138 MMOL/L (ref 136–145)
TACROLIMUS BLD-MCNC: 9.2 NG/ML (ref 5–15)
WBC # BLD AUTO: 3.86 K/UL (ref 3.9–12.7)

## 2023-03-06 PROCEDURE — 36415 COLL VENOUS BLD VENIPUNCTURE: CPT | Performed by: INTERNAL MEDICINE

## 2023-03-06 PROCEDURE — 80069 RENAL FUNCTION PANEL: CPT | Performed by: INTERNAL MEDICINE

## 2023-03-06 PROCEDURE — 82150 ASSAY OF AMYLASE: CPT | Performed by: INTERNAL MEDICINE

## 2023-03-06 PROCEDURE — 80197 ASSAY OF TACROLIMUS: CPT | Performed by: INTERNAL MEDICINE

## 2023-03-06 PROCEDURE — 85025 COMPLETE CBC W/AUTO DIFF WBC: CPT | Performed by: INTERNAL MEDICINE

## 2023-03-06 PROCEDURE — 83690 ASSAY OF LIPASE: CPT | Performed by: INTERNAL MEDICINE

## 2023-03-09 ENCOUNTER — PATIENT MESSAGE (OUTPATIENT)
Dept: TRANSPLANT | Facility: CLINIC | Age: 28
End: 2023-03-09
Payer: MEDICARE

## 2023-03-13 ENCOUNTER — PATIENT MESSAGE (OUTPATIENT)
Dept: TRANSPLANT | Facility: CLINIC | Age: 28
End: 2023-03-13
Payer: MEDICARE

## 2023-03-17 ENCOUNTER — TELEPHONE (OUTPATIENT)
Dept: OPHTHALMOLOGY | Facility: CLINIC | Age: 28
End: 2023-03-17
Payer: MEDICARE

## 2023-03-20 NOTE — PRE-PROCEDURE INSTRUCTIONS
PREOP INSTRUCTIONS:     NO SOLID FOOD, MILK OR MILK PRODUCTS 8 HOURS PRIOR TO SX START TIME.  YOU MAY ONLY HAVE SIPS OF WATER WITH MORNING MEDICATIONS (1) HOUR PRIOR TO ARRIVAL TO HOSPITAL.   Instructed to follow the surgeon's instructions if they differ from these.Shower instructions as well as directions to the Surgery Center were given.Encouraged to wear loose fitting,comfortable clothing.Medication instructions for pm prior to and am of procedure reviewed.Instructed to avoid taking vitamins,supplements,aspirin and ibuprofen the morning of surgery. Patient's questions and concerns addressed .Patient informed of the current visitor policy      Patient denies any side effects or issues with anesthesia or sedation.      1100 ARRIVAL TO [facility]

## 2023-03-21 ENCOUNTER — ANESTHESIA EVENT (OUTPATIENT)
Dept: SURGERY | Facility: HOSPITAL | Age: 28
End: 2023-03-21
Payer: MEDICARE

## 2023-03-21 ENCOUNTER — ANESTHESIA (OUTPATIENT)
Dept: SURGERY | Facility: HOSPITAL | Age: 28
End: 2023-03-21
Payer: MEDICARE

## 2023-03-21 ENCOUNTER — HOSPITAL ENCOUNTER (OUTPATIENT)
Facility: HOSPITAL | Age: 28
Discharge: HOME | End: 2023-03-21
Attending: OPHTHALMOLOGY | Admitting: OPHTHALMOLOGY
Payer: MEDICARE

## 2023-03-21 VITALS
SYSTOLIC BLOOD PRESSURE: 124 MMHG | HEART RATE: 110 BPM | TEMPERATURE: 98 F | DIASTOLIC BLOOD PRESSURE: 51 MMHG | BODY MASS INDEX: 22.81 KG/M2 | OXYGEN SATURATION: 98 % | WEIGHT: 133.63 LBS | RESPIRATION RATE: 15 BRPM | HEIGHT: 64 IN

## 2023-03-21 DIAGNOSIS — E10.3521 TYPE 1 DIABETES MELLITUS WITH PROLIFERATIVE DIABETIC RETINOPATHY WITH TRACTION RETINAL DETACHMENT INVOLVING THE MACULA, RIGHT EYE: Primary | ICD-10-CM

## 2023-03-21 LAB
B-HCG UR QL: NEGATIVE
CTP QC/QA: YES

## 2023-03-21 PROCEDURE — 71000044 HC DOSC ROUTINE RECOVERY FIRST HOUR: Performed by: OPHTHALMOLOGY

## 2023-03-21 PROCEDURE — 63600175 PHARM REV CODE 636 W HCPCS: Performed by: OPHTHALMOLOGY

## 2023-03-21 PROCEDURE — C1814 RETINAL TAMP, SILICONE OIL: HCPCS | Performed by: OPHTHALMOLOGY

## 2023-03-21 PROCEDURE — 99499 UNLISTED E&M SERVICE: CPT | Mod: ,,, | Performed by: STUDENT IN AN ORGANIZED HEALTH CARE EDUCATION/TRAINING PROGRAM

## 2023-03-21 PROCEDURE — C1784 OCULAR DEV, INTRAOP, DET RET: HCPCS | Performed by: OPHTHALMOLOGY

## 2023-03-21 PROCEDURE — 67113 PR REPAIR COMPLEX RETINA DETACH VITRECTOMY & MEMB PEEL: ICD-10-PCS | Mod: RT,,, | Performed by: OPHTHALMOLOGY

## 2023-03-21 PROCEDURE — 99499 NO LOS: ICD-10-PCS | Mod: ,,, | Performed by: STUDENT IN AN ORGANIZED HEALTH CARE EDUCATION/TRAINING PROGRAM

## 2023-03-21 PROCEDURE — 37000008 HC ANESTHESIA 1ST 15 MINUTES: Performed by: OPHTHALMOLOGY

## 2023-03-21 PROCEDURE — 25000003 PHARM REV CODE 250: Performed by: NURSE ANESTHETIST, CERTIFIED REGISTERED

## 2023-03-21 PROCEDURE — 67113 REPAIR RETINAL DETACH CPLX: CPT | Mod: RT,,, | Performed by: OPHTHALMOLOGY

## 2023-03-21 PROCEDURE — D9220A PRA ANESTHESIA: Mod: CRNA,,, | Performed by: NURSE ANESTHETIST, CERTIFIED REGISTERED

## 2023-03-21 PROCEDURE — 36000707: Performed by: OPHTHALMOLOGY

## 2023-03-21 PROCEDURE — 63600175 PHARM REV CODE 636 W HCPCS: Mod: TB,JG | Performed by: NURSE ANESTHETIST, CERTIFIED REGISTERED

## 2023-03-21 PROCEDURE — 27201423 OPTIME MED/SURG SUP & DEVICES STERILE SUPPLY: Performed by: OPHTHALMOLOGY

## 2023-03-21 PROCEDURE — 36000706: Performed by: OPHTHALMOLOGY

## 2023-03-21 PROCEDURE — D9220A PRA ANESTHESIA: ICD-10-PCS | Mod: CRNA,,, | Performed by: NURSE ANESTHETIST, CERTIFIED REGISTERED

## 2023-03-21 PROCEDURE — D9220A PRA ANESTHESIA: Mod: ANES,,, | Performed by: ANESTHESIOLOGY

## 2023-03-21 PROCEDURE — 81025 URINE PREGNANCY TEST: CPT | Performed by: OPHTHALMOLOGY

## 2023-03-21 PROCEDURE — 71000015 HC POSTOP RECOV 1ST HR: Performed by: OPHTHALMOLOGY

## 2023-03-21 PROCEDURE — 25000003 PHARM REV CODE 250: Performed by: OPHTHALMOLOGY

## 2023-03-21 PROCEDURE — 25000003 PHARM REV CODE 250

## 2023-03-21 PROCEDURE — 37000009 HC ANESTHESIA EA ADD 15 MINS: Performed by: OPHTHALMOLOGY

## 2023-03-21 PROCEDURE — D9220A PRA ANESTHESIA: ICD-10-PCS | Mod: ANES,,, | Performed by: ANESTHESIOLOGY

## 2023-03-21 RX ORDER — VANCOMYCIN HYDROCHLORIDE 500 MG/10ML
INJECTION, POWDER, LYOPHILIZED, FOR SOLUTION INTRAVENOUS
Status: DISCONTINUED | OUTPATIENT
Start: 2023-03-21 | End: 2023-03-21 | Stop reason: HOSPADM

## 2023-03-21 RX ORDER — MOXIFLOXACIN 5 MG/ML
1 SOLUTION/ DROPS OPHTHALMIC
Status: DISCONTINUED | OUTPATIENT
Start: 2023-03-21 | End: 2023-03-21 | Stop reason: HOSPADM

## 2023-03-21 RX ORDER — HYDROCODONE BITARTRATE AND ACETAMINOPHEN 5; 325 MG/1; MG/1
1 TABLET ORAL EVERY 4 HOURS PRN
Status: DISCONTINUED | OUTPATIENT
Start: 2023-03-21 | End: 2023-03-21 | Stop reason: HOSPADM

## 2023-03-21 RX ORDER — PHENYLEPHRINE HYDROCHLORIDE 25 MG/ML
1 SOLUTION/ DROPS OPHTHALMIC
Status: DISCONTINUED | OUTPATIENT
Start: 2023-03-21 | End: 2023-03-21 | Stop reason: HOSPADM

## 2023-03-21 RX ORDER — CYCLOPENTOLATE HYDROCHLORIDE 10 MG/ML
1 SOLUTION/ DROPS OPHTHALMIC
Status: DISCONTINUED | OUTPATIENT
Start: 2023-03-21 | End: 2023-03-21 | Stop reason: HOSPADM

## 2023-03-21 RX ORDER — OXYCODONE HYDROCHLORIDE 5 MG/1
5 TABLET ORAL
Status: DISCONTINUED | OUTPATIENT
Start: 2023-03-21 | End: 2023-03-21 | Stop reason: HOSPADM

## 2023-03-21 RX ORDER — DEXAMETHASONE SODIUM PHOSPHATE 4 MG/ML
INJECTION, SOLUTION INTRA-ARTICULAR; INTRALESIONAL; INTRAMUSCULAR; INTRAVENOUS; SOFT TISSUE
Status: DISCONTINUED | OUTPATIENT
Start: 2023-03-21 | End: 2023-03-21

## 2023-03-21 RX ORDER — FENTANYL CITRATE 50 UG/ML
INJECTION, SOLUTION INTRAMUSCULAR; INTRAVENOUS
Status: DISCONTINUED | OUTPATIENT
Start: 2023-03-21 | End: 2023-03-21

## 2023-03-21 RX ORDER — VASOPRESSIN 20 [USP'U]/ML
INJECTION, SOLUTION INTRAMUSCULAR; SUBCUTANEOUS
Status: DISCONTINUED | OUTPATIENT
Start: 2023-03-21 | End: 2023-03-21

## 2023-03-21 RX ORDER — PREDNISOLONE ACETATE 10 MG/ML
1 SUSPENSION/ DROPS OPHTHALMIC
Status: DISCONTINUED | OUTPATIENT
Start: 2023-03-21 | End: 2023-03-21 | Stop reason: HOSPADM

## 2023-03-21 RX ORDER — ACETAMINOPHEN 325 MG/1
325 TABLET ORAL EVERY 6 HOURS PRN
Refills: 0
Start: 2023-03-21 | End: 2023-05-15 | Stop reason: ALTCHOICE

## 2023-03-21 RX ORDER — NEOMYCIN SULFATE, POLYMYXIN B SULFATE, AND DEXAMETHASONE 3.5; 10000; 1 MG/G; [USP'U]/G; MG/G
OINTMENT OPHTHALMIC
Status: DISCONTINUED | OUTPATIENT
Start: 2023-03-21 | End: 2023-03-21 | Stop reason: HOSPADM

## 2023-03-21 RX ORDER — PHENYLEPHRINE HYDROCHLORIDE 10 MG/ML
INJECTION INTRAVENOUS
Status: DISCONTINUED | OUTPATIENT
Start: 2023-03-21 | End: 2023-03-21

## 2023-03-21 RX ORDER — ATROPINE SULFATE 10 MG/ML
1 SOLUTION/ DROPS OPHTHALMIC
Status: DISCONTINUED | OUTPATIENT
Start: 2023-03-21 | End: 2023-03-21 | Stop reason: HOSPADM

## 2023-03-21 RX ORDER — DEXAMETHASONE SODIUM PHOSPHATE 4 MG/ML
INJECTION, SOLUTION INTRA-ARTICULAR; INTRALESIONAL; INTRAMUSCULAR; INTRAVENOUS; SOFT TISSUE
Status: DISCONTINUED | OUTPATIENT
Start: 2023-03-21 | End: 2023-03-21 | Stop reason: HOSPADM

## 2023-03-21 RX ORDER — TETRACAINE HYDROCHLORIDE 5 MG/ML
1 SOLUTION OPHTHALMIC
Status: DISCONTINUED | OUTPATIENT
Start: 2023-03-21 | End: 2023-03-21 | Stop reason: HOSPADM

## 2023-03-21 RX ORDER — NEOMYCIN SULFATE, POLYMYXIN B SULFATE, AND DEXAMETHASONE 3.5; 10000; 1 MG/G; [USP'U]/G; MG/G
OINTMENT OPHTHALMIC
Status: DISCONTINUED
Start: 2023-03-21 | End: 2023-03-21 | Stop reason: HOSPADM

## 2023-03-21 RX ORDER — DEXMEDETOMIDINE HYDROCHLORIDE 100 UG/ML
INJECTION, SOLUTION INTRAVENOUS
Status: DISCONTINUED | OUTPATIENT
Start: 2023-03-21 | End: 2023-03-21

## 2023-03-21 RX ORDER — ACETAMINOPHEN 325 MG/1
650 TABLET ORAL EVERY 4 HOURS PRN
Status: DISCONTINUED | OUTPATIENT
Start: 2023-03-21 | End: 2023-03-21 | Stop reason: HOSPADM

## 2023-03-21 RX ORDER — DEXAMETHASONE SODIUM PHOSPHATE 4 MG/ML
INJECTION, SOLUTION INTRA-ARTICULAR; INTRALESIONAL; INTRAMUSCULAR; INTRAVENOUS; SOFT TISSUE
Status: DISCONTINUED
Start: 2023-03-21 | End: 2023-03-21 | Stop reason: HOSPADM

## 2023-03-21 RX ORDER — ONDANSETRON 2 MG/ML
INJECTION INTRAMUSCULAR; INTRAVENOUS
Status: DISCONTINUED | OUTPATIENT
Start: 2023-03-21 | End: 2023-03-21

## 2023-03-21 RX ORDER — EPINEPHRINE 1 MG/ML
INJECTION, SOLUTION, CONCENTRATE INTRAVENOUS
Status: DISCONTINUED | OUTPATIENT
Start: 2023-03-21 | End: 2023-03-21 | Stop reason: HOSPADM

## 2023-03-21 RX ORDER — EPINEPHRINE 1 MG/ML
INJECTION, SOLUTION, CONCENTRATE INTRAVENOUS
Status: DISCONTINUED
Start: 2023-03-21 | End: 2023-03-21 | Stop reason: HOSPADM

## 2023-03-21 RX ORDER — ACETAMINOPHEN 10 MG/ML
INJECTION, SOLUTION INTRAVENOUS
Status: DISCONTINUED | OUTPATIENT
Start: 2023-03-21 | End: 2023-03-21

## 2023-03-21 RX ORDER — LIDOCAINE HYDROCHLORIDE 20 MG/ML
INJECTION, SOLUTION EPIDURAL; INFILTRATION; INTRACAUDAL; PERINEURAL
Status: DISCONTINUED | OUTPATIENT
Start: 2023-03-21 | End: 2023-03-21

## 2023-03-21 RX ORDER — SODIUM CHLORIDE 0.9 % (FLUSH) 0.9 %
10 SYRINGE (ML) INJECTION
Status: DISCONTINUED | OUTPATIENT
Start: 2023-03-21 | End: 2023-03-21 | Stop reason: HOSPADM

## 2023-03-21 RX ORDER — PROPOFOL 10 MG/ML
VIAL (ML) INTRAVENOUS
Status: DISCONTINUED | OUTPATIENT
Start: 2023-03-21 | End: 2023-03-21

## 2023-03-21 RX ORDER — MIDAZOLAM HYDROCHLORIDE 1 MG/ML
INJECTION, SOLUTION INTRAMUSCULAR; INTRAVENOUS
Status: DISCONTINUED | OUTPATIENT
Start: 2023-03-21 | End: 2023-03-21

## 2023-03-21 RX ADMIN — CYCLOPENTOLATE HYDROCHLORIDE 1 DROP: 10 SOLUTION/ DROPS OPHTHALMIC at 10:03

## 2023-03-21 RX ADMIN — PHENYLEPHRINE HYDROCHLORIDE 1 DROP: 25 SOLUTION/ DROPS OPHTHALMIC at 10:03

## 2023-03-21 RX ADMIN — PHENYLEPHRINE HYDROCHLORIDE 200 MCG: 10 INJECTION INTRAVENOUS at 12:03

## 2023-03-21 RX ADMIN — HYDROCORTISONE SODIUM SUCCINATE 100 MG: 100 INJECTION, POWDER, FOR SOLUTION INTRAMUSCULAR; INTRAVENOUS at 11:03

## 2023-03-21 RX ADMIN — PREDNISOLONE ACETATE 1 DROP: 10 SUSPENSION/ DROPS OPHTHALMIC at 10:03

## 2023-03-21 RX ADMIN — MOXIFLOXACIN OPHTHALMIC 1 DROP: 5 SOLUTION/ DROPS OPHTHALMIC at 10:03

## 2023-03-21 RX ADMIN — TETRACAINE HYDROCHLORIDE 1 DROP: 5 SOLUTION OPHTHALMIC at 10:03

## 2023-03-21 RX ADMIN — FENTANYL CITRATE 100 MCG: 50 INJECTION INTRAMUSCULAR; INTRAVENOUS at 11:03

## 2023-03-21 RX ADMIN — PHENYLEPHRINE HYDROCHLORIDE 100 MCG: 10 INJECTION INTRAVENOUS at 12:03

## 2023-03-21 RX ADMIN — PHENYLEPHRINE HYDROCHLORIDE 200 MCG: 10 INJECTION INTRAVENOUS at 11:03

## 2023-03-21 RX ADMIN — ACETAMINOPHEN 1000 MG: 10 INJECTION INTRAVENOUS at 12:03

## 2023-03-21 RX ADMIN — VASOPRESSIN 1 UNITS: 20 INJECTION INTRAVENOUS at 01:03

## 2023-03-21 RX ADMIN — VASOPRESSIN 2 UNITS: 20 INJECTION INTRAVENOUS at 01:03

## 2023-03-21 RX ADMIN — ATROPINE SULFATE 1 DROP: 10 SOLUTION OPHTHALMIC at 10:03

## 2023-03-21 RX ADMIN — PROPOFOL 200 MG: 10 INJECTION, EMULSION INTRAVENOUS at 11:03

## 2023-03-21 RX ADMIN — MIDAZOLAM 2 MG: 1 INJECTION INTRAMUSCULAR; INTRAVENOUS at 11:03

## 2023-03-21 RX ADMIN — LIDOCAINE HYDROCHLORIDE 100 MG: 20 INJECTION, SOLUTION EPIDURAL; INFILTRATION; INTRACAUDAL at 11:03

## 2023-03-21 RX ADMIN — HYDROCODONE BITARTRATE AND ACETAMINOPHEN 1 TABLET: 5; 325 TABLET ORAL at 02:03

## 2023-03-21 RX ADMIN — GLYCOPYRROLATE 0.1 MG: 0.2 INJECTION, SOLUTION INTRAMUSCULAR; INTRAVENOUS at 11:03

## 2023-03-21 RX ADMIN — SODIUM CHLORIDE: 0.9 INJECTION, SOLUTION INTRAVENOUS at 11:03

## 2023-03-21 RX ADMIN — SODIUM CHLORIDE, SODIUM GLUCONATE, SODIUM ACETATE, POTASSIUM CHLORIDE, MAGNESIUM CHLORIDE, SODIUM PHOSPHATE, DIBASIC, AND POTASSIUM PHOSPHATE: .53; .5; .37; .037; .03; .012; .00082 INJECTION, SOLUTION INTRAVENOUS at 01:03

## 2023-03-21 RX ADMIN — ONDANSETRON 4 MG: 2 INJECTION INTRAMUSCULAR; INTRAVENOUS at 12:03

## 2023-03-21 RX ADMIN — DEXMEDETOMIDINE 4 MCG: 100 INJECTION, SOLUTION INTRAVENOUS at 01:03

## 2023-03-21 RX ADMIN — DEXMEDETOMIDINE 4 MCG: 100 INJECTION, SOLUTION INTRAVENOUS at 11:03

## 2023-03-21 RX ADMIN — DEXAMETHASONE SODIUM PHOSPHATE 4 MG: 4 INJECTION, SOLUTION INTRAMUSCULAR; INTRAVENOUS at 12:03

## 2023-03-21 RX ADMIN — VASOPRESSIN 1 UNITS: 20 INJECTION INTRAVENOUS at 12:03

## 2023-03-21 NOTE — DISCHARGE SUMMARY
[facility] - Surgery (1st Fl)  Discharge Note  Short Stay    Date of Admission: 03/21/2023    Procedure(s) (LRB):  REPAIR,RETINAL DETACHMENT,COMPLEX,WITH VITRECTOMY AND MEMBRANE PEELING (Right)      OUTCOME: Patient tolerated treatment/procedure well without complication and is now ready for discharge.    DISPOSITION: Home or Self Care    FINAL DIAGNOSIS:  Tractional retinal detachment, left eye    FOLLOWUP: In clinic    DISCHARGE INSTRUCTIONS:    Discharge Procedure Orders   Diet general     Sponge bath only until clinic visit     Lifting restrictions     Leave dressing on - Keep it clean, dry, and intact until clinic visit     Call MD for:  temperature >100.4     Call MD for:  persistent nausea and vomiting     Call MD for:  severe uncontrolled pain     Call MD for:  difficulty breathing, headache or visual disturbances     Call MD for:  redness, tenderness, or signs of infection (pain, swelling, redness, odor or green/yellow discharge around incision site)     Call MD for:  hives     Call MD for:  persistent dizziness or light-headedness     Call MD for:  extreme fatigue     Call MD for:        TIME SPENT ON DISCHARGE: 20 minutes

## 2023-03-21 NOTE — ANESTHESIA PREPROCEDURE EVALUATION
2023  Isabela Read is a 28 y.o., female.    Pre-operative evaluation for Procedure(s) (LRB):  REPAIR,RETINAL DETACHMENT,COMPLEX,WITH VITRECTOMY AND MEMBRANE PEELING (Right)    Isabela Read is a 28 y.o. female sp patricio pancreas transplant with ongoing retinal detachment procedures. She denies anesthetic complications with adequate kidney and pancreatic function. Denies pain or other systemic problem presently.    LDA:     Prev airway:     Drips:     Patient Active Problem List   Diagnosis    Renovascular hypertension    Ventricular bigeminy    Vitamin D deficiency    Hyperlipidemia    Anemia in ESRD (end-stage renal disease)    Iron deficiency anemia    Anxiety    Recurrent major depressive disorder, in partial remission    Anemia due to chronic kidney disease    Nephrotic syndrome    Secondary hyperparathyroidism    Acute kidney injury superimposed on chronic kidney disease    Hepatomegaly    Hypertension, essential    Iron overload    Leukocytosis    Acute angle-closure glaucoma of right eye    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, associated with type 1 diabetes mellitus    Insomnia    Gastroparesis    Hypocalcemia    Major depressive disorder, single episode, unspecified    (HFpEF) heart failure with preserved ejection fraction    Sinus tachycardia    Abnormal CT scan of lung    Status post -donor simultaneous pancreas kidney transplantation    At risk for opportunistic infections    Prophylactic immunotherapy    Long-term use of immunosuppressant medication    Anemia of chronic disease    Severe sepsis    Pyelonephritis, acute    Immunosuppressive management encounter following kidney transplant    Status post pancreas transplantation    Urinary retention with  incomplete bladder emptying    Steroid-induced hyperglycemia    AVF (arteriovenous fistula)    History of diabetes mellitus, type I    Long term systemic steroid user    Sepsis due to Escherichia coli    Bacteremia due to Escherichia coli    Orthostatic hypotension       Review of patient's allergies indicates:  No Known Allergies     No current facility-administered medications on file prior to encounter.     Current Outpatient Medications on File Prior to Encounter   Medication Sig Dispense Refill    aspirin (ECOTRIN) 81 MG EC tablet Take 81 mg by mouth once daily.      mycophenolate (CELLCEPT) 250 mg Cap Take 2 capsules (500 mg total) by mouth 2 (two) times daily. 120 capsule 11    predniSONE (DELTASONE) 5 MG tablet Take 1 tablet (5 mg total) by mouth once daily. 30 tablet 11    ergocalciferol (ERGOCALCIFEROL) 50,000 unit Cap Take 1 capsule (50,000 Units total) by mouth every 7 days. 4 capsule 5    medroxyPROGESTERone (DEPO-PROVERA) 150 mg/mL Syrg Inject 1 mL (150 mg total) into the muscle once. for 1 dose 1 mL 3    midodrine (PROAMATINE) 5 MG Tab Take 1 tablet (5 mg total) by mouth 3 (three) times daily. (Patient taking differently: Take 5 mg by mouth 3 (three) times daily as needed.) 90 tablet 11    multivitamin (THERAGRAN) per tablet Take 1 tablet by mouth once daily. 30 tablet 5    ondansetron (ZOFRAN-ODT) 8 MG TbDL DISSOLVE 1 tablet (8 mg total) by mouth every 8 (eight) hours as needed (nausea). (Patient not taking: Reported on 3/20/2023) 30 tablet 1    promethazine (PHENERGAN) 12.5 MG Tab Take 1 tablet (12.5 mg total) by mouth every 6 (six) hours as needed (nausea). 30 tablet 1    rosuvastatin (CRESTOR) 10 MG tablet Take 1 tablet (10 mg total) by mouth every evening. 90 tablet 0    sodium bicarbonate 650 MG tablet Take 2 tablets (1,300 mg total) by mouth 2 (two) times daily. 120 tablet 11    sulfamethoxazole-trimethoprim 400-80mg (BACTRIM,SEPTRA) 400-80 mg per tablet Take 1 tablet by  mouth once daily. Stop 5/2/2023 30 tablet 3    venlafaxine (EFFEXOR XR) 37.5 MG 24 hr capsule Take 1 capsule (37.5 mg total) by mouth once daily. 30 capsule 11       Past Surgical History:   Procedure Laterality Date    AV FISTULA PLACEMENT Left 04/07/2021    Procedure: CREATION, AV FISTULA;  Surgeon: Roberto Ryan MD;  [facility];  Service: Peripheral Vascular;  Laterality: Left;    COLONOSCOPY N/A 03/16/2022    Procedure: COLONOSCOPY;  Surgeon: Tavo Kwok MD;  Location: [facility] ENDO (4TH FLR);  Service: Endoscopy;  Laterality: N/A;  Questionable history of delayed gastric emptying longstanding diabetes now on eating pre transplant workup for history of nausea vomiting which seems to have improved with dialysis also chronic diarrhea and history of anemia pre transplant workup for kidney transplant. 3    CYSTOSCOPY      ESOPHAGOGASTRODUODENOSCOPY N/A 03/16/2022    Procedure: EGD (ESOPHAGOGASTRODUODENOSCOPY);  Surgeon: Tavo Kwok MD;  [facility] (4TH [facility]);  Service: Endoscopy;  Laterality: N/A;  Questionable history of delayed gastric emptying longstanding diabetes now on eating pre transplant workup for history of nausea vomiting which seems to have improved with dialysis also chronic diarrhea and history of anemia pre transplant workup for    FISTULOGRAM N/A 08/11/2021    Procedure: Fistulogram;  Surgeon: Roberto Ryan MD;  Location: [facility] CATH LAB;  Service: Cardiology;  Laterality: N/A;    KIDNEY TRANSPLANT Right 11/02/2022    Procedure: TRANSPLANT, KIDNEY;  Surgeon: Kumar Rodriges Jr., MD;  Location: [facility] 2ND FLR;  Service: Transplant;  Laterality: Right;    LASER PHOTOCOAGULATION OF RETINA Right 05/31/2022    Procedure: PHOTOCOAGULATION, RETINA, USING LASER;  Surgeon: Maximilian Montaño MD;  Location: [facility] OR 1ST FLR;  Service: Ophthalmology;  Laterality: Right;    PERCUTANEOUS TRANSLUMINAL ANGIOPLASTY OF ARTERIOVENOUS FISTULA N/A 08/11/2021     Procedure: PTA, AV FISTULA;  Surgeon: Roberto Ryan MD;  Location: [facility] CATH LAB;  Service: Cardiology;  Laterality: N/A;    REMOVAL IMPLANT, POSTERIOR SEGMENT, INTRAOCULAR Right 02/01/2022    Procedure: REMOVAL IMPLANT, POSTERIOR SEGMENT, INTRAOCULAR;  Surgeon: Maximilian Montaño MD;  Location: [facility] OR 1ST FLR;  Service: Ophthalmology;  Laterality: Right;    REPAIR OF RETINAL DETACHMENT WITH VITRECTOMY Right 01/25/2022    Procedure: REPAIR, RETINAL DETACHMENT, WITH VITRECTOMY, MEMBRANE PEEL, LASER, INJECTION OF GAS VS OIL;  Surgeon: Maximilian Montaño MD;  Location: [facility] OR 1ST FLR;  Service: Ophthalmology;  Laterality: Right;    REVISION OF ARTERIOVENOUS FISTULA Left 12/10/2021    Procedure: REVISION, AV FISTULA with BVT;  Surgeon: Roberto Ryan MD;  Location: [facility] OR 2ND FLR;  Service: Peripheral Vascular;  Laterality: Left;    TRANSPLANTATION OF PANCREAS N/A 11/02/2022    Procedure: TRANSPLANT, PANCREAS;  Surgeon: Kumar Rodriges Jr., MD;  [facility];  Service: Transplant;  Laterality: N/A;    VITRECTOMY BY PARS PLANA APPROACH Right 02/01/2022    Procedure: VITRECTOMY, PARS PLANA APPROACH;  Surgeon: Maximilian Montaño MD;  [facility];  Service: Ophthalmology;  Laterality: Right;    VITRECTOMY BY PARS PLANA APPROACH Right 05/31/2022    Procedure: VITRECTOMY, PARS PLANA APPROACH;  Surgeon: Maximilian Montaño MD;  [facility];  Service: Ophthalmology;  Laterality: Right;       Social History     Socioeconomic History    Marital status: Single   Occupational History    Occupation:  at [employer]   Tobacco Use    Smoking status: Never    Smokeless tobacco: Never   Substance and Sexual Activity    Alcohol use: No    Drug use: No    Sexual activity: Not Currently     Partners: Male     Comment: monogamous   Social History Narrative    Caregiver        Single    No kids    Had Miscarriage  At 23 weeks from preeclampsia    Disabled          Vital Signs Range (Last 24H):         CBC: No results for input(s): WBC, RBC, HGB, HCT, PLT, MCV, MCH, MCHC in the last 72 hours.    CMP: No results for input(s): NA, K, CL, CO2, BUN, CREATININE, GLU, MG, PHOS, CALCIUM, ALBUMIN, PROT, ALKPHOS, ALT, AST, BILITOT in the last 72 hours.    INR  No results for input(s): PT, INR, PROTIME, APTT in the last 72 hours.        Diagnostic Studies:      EKD Echo:        Pre-op Assessment    I have reviewed the Patient Summary Reports.     I have reviewed the Nursing Notes.    I have reviewed the Medications.     Review of Systems  Anesthesia Hx:  No problems with previous Anesthesia    Social:  Non-Smoker    Hematology/Oncology:  Hematology Normal   Oncology Normal     EENT/Dental:EENT/Dental Normal   Pulmonary:  Pulmonary Normal    Hepatic/GI:  Hepatic/GI Normal    Musculoskeletal:  Musculoskeletal Normal    Neurological:  Neurology Normal    Dermatological:  Skin Normal        Physical Exam  General: Well nourished    Airway:  Mallampati: II   Mouth Opening: Normal  TM Distance: Normal  Tongue: Normal  Neck ROM: Normal ROM    Chest/Lungs:  Clear to auscultation        Anesthesia Plan  Type of Anesthesia, risks & benefits discussed:    Anesthesia Type: Gen Supraglottic Airway  Intra-op Monitoring Plan: Standard ASA Monitors  Post Op Pain Control Plan: multimodal analgesia  Induction:  IV  Informed Consent: Informed consent signed with the Patient and all parties understand the risks and agree with anesthesia plan.  All questions answered.   ASA Score: 3    Ready For Surgery From Anesthesia Perspective.     .

## 2023-03-21 NOTE — OP NOTE
Date of Procedure: 03/21/2023   Pre-Op Dx: Type 1 diabetes with proliferative retinopathy and tractional retinal detachment, right eye  Post Op Dx: Type 1 diabetes with proliferative retinopathy and combined rhegmatogenous and tractional retinal detachment, proliferative vitreoretinopathy, right eye  Procedure Performed: Repair of complex retinal detachment by pars plana vitrectomy, removal of silicone oil, membrane peel, injection of perfluorocarbon liquid, endolaser, injection of 5000cs silicone oil, right eye  Attending Surgeon: ARMIN Montaño  Assistant Surgeon: ALLEN Bobo  Anesthesia: Local/Mac, retrobulbar injection of 50/50 mixture 0.75% bupivicaine, 2% lidocaine  Estimated blood loss: Minimal  Complication: None  Specimen: None  Disposition: Stable to recovery  Findings: Macular and inferior preretinal and subretinal proliferative vitreoretinopathy membranes, inferior stretch holes  Outcome: Retina attached  Date of Discharge: 03/21/2023  Discharge Disposition: Home  F/U: 03/22/23        Informed consent was obtained and placed in the medical record. The patient was properly identified and taken to the operating room. General anesthesia was begun by the anesthesia team.  The right eye was prepped and draped in the standard ophthalmic fashion taking care to exclude the lashes from the operative field.    25 gauge vitrectomy setup was achieved with all ports 3.75 mm posterior to the limbus except that the superotemporal port was 23 gauge. The [employer] visualization system was used. Silicone oil was removed using the viscous fluid extractor through the 23 gauge port.  The retina was inspected for 360 degrees to the ora nora using scleral depression.  There were no superior breaks or detachments. There was an inferior retinal detachment which involved the macula and inferior retina.  There were extensive sheets and bands of preretinal avascular membranes from the optic nerve, over the macular and invested  in broad adhesions in the inferior mid and far periphery.  There were large inferotemporal and smaller inferonasal stretch holes.  Meticulous dissection and removal of the posterior membranes was performed using the cutter and forceps.  They were consistent with proliferative vitreoretinopathy membranes.  After removal of the preretinal membranes, subretinal membranes inferiorly also became apparent.  They were removed.  When all possible membranes were removed, there was still some stiffening of the inferior retina.  The taut retina in combination with the large inferior holes (which combined involved about four clock hours) prompted the decision to perform inferior retinectomy.  Diathermy and the cutter were used to make an inferior 180 degree retinectomy. The retina and fibrosis were excised up to the ora nora.  The retina was now mobile.  Perfluorocarbon liquid on a dual bore cannula was used to flatten the retina.  The retina was lying nicely flat under perfluorocarbon.  Laser photocoagulation was placed on the entire edge of the retinectomy to the ora.  An inferior peripheral iridotomy was made with the cutter through the iris and capsule.   Fluid/air exchange was performed. The retina was lying nicely flat under air.  The eye was filled with 5000 cs silicone oil to the pupil.    The ports were removed. The infusion cannula was removed.  All wounds were sutured with 7-0 Vicryl suture.  The eye was normotensive. A subconjunctival injection of vancomycin and dexamethasone was placed.     The eye was patched. A Little shield was placed. The patient was awakened from general anesthesia by the anesthesia team having tolerated the procedure well.

## 2023-03-21 NOTE — H&P
Pre-Operative History & Physical  Ophthalmology      SUBJECTIVE:     History of Present Illness:  Patient is a 28 y.o. female presents with Type 1 diabetes mellitus with right eye affected by proliferative retinopathy and traction retinal detachment involving macula [E10.3521]  Type 1 diabetes mellitus with proliferative diabetic retinopathy with traction retinal detachment involving the macula, right eye [E10.3521].    MEDICATIONS:   PTA Medications   Medication Sig    aspirin (ECOTRIN) 81 MG EC tablet Take 81 mg by mouth once daily.    mycophenolate (CELLCEPT) 250 mg Cap Take 2 capsules (500 mg total) by mouth 2 (two) times daily.    predniSONE (DELTASONE) 5 MG tablet Take 1 tablet (5 mg total) by mouth once daily.    tacrolimus (PROGRAF) 0.5 MG Cap Take 1 capsule (0.5 mg total) by mouth every 12 (twelve) hours. Take 1- 0.5mg capsule twice daily PLUS 4- 1mg capsules twice daily to equal total dose of 4.5mg twice a day    tacrolimus (PROGRAF) 1 MG Cap Take 4 capsules (4 mg total) by mouth every morning AND 4 capsules (4 mg total) every evening.    ergocalciferol (ERGOCALCIFEROL) 50,000 unit Cap Take 1 capsule (50,000 Units total) by mouth every 7 days.    medroxyPROGESTERone (DEPO-PROVERA) 150 mg/mL Syrg Inject 1 mL (150 mg total) into the muscle once. for 1 dose    midodrine (PROAMATINE) 5 MG Tab Take 1 tablet (5 mg total) by mouth 3 (three) times daily. (Patient taking differently: Take 5 mg by mouth 3 (three) times daily as needed.)    multivitamin (THERAGRAN) per tablet Take 1 tablet by mouth once daily.    ondansetron (ZOFRAN-ODT) 8 MG TbDL DISSOLVE 1 tablet (8 mg total) by mouth every 8 (eight) hours as needed (nausea). (Patient not taking: Reported on 3/20/2023)    promethazine (PHENERGAN) 12.5 MG Tab Take 1 tablet (12.5 mg total) by mouth every 6 (six) hours as needed (nausea).    rosuvastatin (CRESTOR) 10 MG tablet Take 1 tablet (10 mg total) by mouth every evening.    sodium bicarbonate 650 MG tablet Take  2 tablets (1,300 mg total) by mouth 2 (two) times daily.    sulfamethoxazole-trimethoprim 400-80mg (BACTRIM,SEPTRA) 400-80 mg per tablet Take 1 tablet by mouth once daily. Stop 5/2/2023    venlafaxine (EFFEXOR XR) 37.5 MG 24 hr capsule Take 1 capsule (37.5 mg total) by mouth once daily.       ALLERGIES: Review of patient's allergies indicates:  No Known Allergies    PAST MEDICAL HISTORY:   Past Medical History:   Diagnosis Date    Acute cystitis without hematuria 11/25/2022    Acute kidney injury superimposed on chronic kidney disease 4/7/2021    Anemia     Bilious vomiting with nausea 12/12/2022    Chronic hypertension with exacerbation during pregnancy in second trimester 11/6/2020    Current regimen (11/6/20):  - Carvedilol 12.5 mg BID - Nifedipine 30 mg daily Baseline CKD + proteinuria    Chronic kidney disease     Depression     Diabetes mellitus     Diabetic retinopathy     Diarrhea     Encounter for blood transfusion     End stage renal failure on dialysis     Fever 12/1/2022    Fever of undetermined origin 12/12/2022    Gastroparesis     Glaucoma     Hepatomegaly 4/29/2021    History of diabetes mellitus, type I 12/20/2022    Hx of psychiatric care     Hyperlipidemia     Hypertension     Nausea and vomiting 12/12/2022    Nephrotic syndrome     Nephrotic syndrome 3/4/2021    Nonspecific reaction to tuberculin skin test without active tuberculosis 10/1/2021    Palpitations     Poor fetal growth affecting management of mother in second trimester 10/15/2020    Pyelonephritis, acute 11/26/2022    Restrictive lung disease     Retinal detachment     Sepsis 12/1/2022    Sepsis 11/25/2022    Severe pre-eclampsia in second trimester 11/6/2020    SIRS (systemic inflammatory response syndrome) 12/6/2022    Type 1 diabetes mellitus with end-stage renal disease (ESRD) 2/24/2015    Followed by Dr. Mayo.  Last A1C was 13  Currently on lantus 20 units qAM and humolog 10 units with meals  - a fetal echocardiogram around 22-24  weeks - needs eye exam, podiatry exam  - needs EKG, maternal echo and fu with cardiology  - ASA 81mg, folic acid 4mg PO daily - hemoglobin A1c every 4-6 weeks -24 hour urine for protein and creatinine clearance should be performed. Patient will also need a     PAST SURGICAL HISTORY:   Past Surgical History:   Procedure Laterality Date    AV FISTULA PLACEMENT Left 04/07/2021    Procedure: CREATION, AV FISTULA;  Surgeon: Roberto Ryan MD;  Location: [facility] OR [address] [place] [address];  Service: Peripheral Vascular;  Laterality: Left;    COLONOSCOPY N/A 03/16/2022    Procedure: COLONOSCOPY;  Surgeon: Tavo Kwok MD;  [facility]  Service: Endoscopy;  Laterality: N/A;  Questionable history of delayed gastric emptying longstanding diabetes now on eating pre transplant workup for history of nausea vomiting which seems to have improved with dialysis also chronic diarrhea and history of anemia pre transplant workup for kidney transplant. 3    CYSTOSCOPY      ESOPHAGOGASTRODUODENOSCOPY N/A 03/16/2022    Procedure: EGD (ESOPHAGOGASTRODUODENOSCOPY);  Surgeon: Tavo Kwok MD;  Location: [facility] ENDO (4TH FLR);  Service: Endoscopy;  Laterality: N/A;  Questionable history of delayed gastric emptying longstanding diabetes now on eating pre transplant workup for history of nausea vomiting which seems to have improved with dialysis also chronic diarrhea and history of anemia pre transplant workup for    FISTULOGRAM N/A 08/11/2021    Procedure: Fistulogram;  Surgeon: Roberto Ryan MD;  Location: [facility] CATH LAB;  Service: Cardiology;  Laterality: N/A;    KIDNEY TRANSPLANT Right 11/02/2022    Procedure: TRANSPLANT, KIDNEY;  Surgeon: Kumar Rodriges Jr., MD;  Location: [facility] OR [address] [place] [address];  Service: Transplant;  Laterality: Right;    LASER PHOTOCOAGULATION OF RETINA Right 05/31/2022    Procedure: PHOTOCOAGULATION, RETINA, USING LASER;  Surgeon: Maximilian Montaño MD;  Location: [facility] OR 1ST FLR;  Service:  Ophthalmology;  Laterality: Right;    PERCUTANEOUS TRANSLUMINAL ANGIOPLASTY OF ARTERIOVENOUS FISTULA N/A 08/11/2021    Procedure: PTA, AV FISTULA;  Surgeon: Roberto Ryan MD;  Location: [facility] CATH LAB;  Service: Cardiology;  Laterality: N/A;    REMOVAL IMPLANT, POSTERIOR SEGMENT, INTRAOCULAR Right 02/01/2022    Procedure: REMOVAL IMPLANT, POSTERIOR SEGMENT, INTRAOCULAR;  Surgeon: Maximilian Montaño MD;  Location: [facility] OR 1ST FLR;  Service: Ophthalmology;  Laterality: Right;    REPAIR OF RETINAL DETACHMENT WITH VITRECTOMY Right 01/25/2022    Procedure: REPAIR, RETINAL DETACHMENT, WITH VITRECTOMY, MEMBRANE PEEL, LASER, INJECTION OF GAS VS OIL;  Surgeon: Maximilian Montaño MD;  Location: [facility] OR 1ST FLR;  Service: Ophthalmology;  Laterality: Right;    REVISION OF ARTERIOVENOUS FISTULA Left 12/10/2021    Procedure: REVISION, AV FISTULA with BVT;  Surgeon: Roberto Ryan MD;  Location: [facility] OR 2ND FLR;  Service: Peripheral Vascular;  Laterality: Left;    TRANSPLANTATION OF PANCREAS N/A 11/02/2022    Procedure: TRANSPLANT, PANCREAS;  Surgeon: Kumar Rodriges Jr., MD;  Location: [facility] OR 2ND FLR;  Service: Transplant;  Laterality: N/A;    VITRECTOMY BY PARS PLANA APPROACH Right 02/01/2022    Procedure: VITRECTOMY, PARS PLANA APPROACH;  Surgeon: Maximilian Montaño MD;  Location: [facility] OR 1ST FLR;  Service: Ophthalmology;  Laterality: Right;    VITRECTOMY BY PARS PLANA APPROACH Right 05/31/2022    Procedure: VITRECTOMY, PARS PLANA APPROACH;  Surgeon: Maximilian Montaño MD;  [facility];  Service: Ophthalmology;  Laterality: Right;     PAST FAMILY HISTORY:   Family History   Problem Relation Age of Onset    Hypertension Mother     Heart disease Father     Diabetes Brother     Celiac disease Neg Hx     Cirrhosis Neg Hx     Colon cancer Neg Hx     Colon polyps Neg Hx     Crohn's disease Neg Hx     Inflammatory bowel disease Neg Hx     Liver cancer Neg Hx     Liver disease Neg Hx     Rectal  cancer Neg Hx     Stomach cancer Neg Hx     Ulcerative colitis Neg Hx     Esophageal cancer Neg Hx     Hemochromatosis Neg Hx     Pancreatic cancer Neg Hx     Kidney cancer Neg Hx     Bladder Cancer Neg Hx     Uterine cancer Neg Hx     Ovarian cancer Neg Hx      SOCIAL HISTORY:   Social History     Tobacco Use    Smoking status: Never    Smokeless tobacco: Never   Substance Use Topics    Alcohol use: No    Drug use: No        MENTAL STATUS: Alert    REVIEW OF SYSTEMS: Negative    OBJECTIVE:     Vital Signs (Most Recent)  Temp: 97.2 °F (36.2 °C) (03/21/23 1043)  Pulse: 110 (03/21/23 1043)  Resp: 18 (03/21/23 1043)  BP: (!) 138/97 (03/21/23 1044)  SpO2: 100 % (03/21/23 1043)    Physical Exam:  General: NAD  HEENT: AT/NC, tractional elevation of TRD with SRF into macula OD, see eye clinic for full note  Lungs: Adequate respirations  Heart: + pulses  Abdomen: Soft    ASSESSMENT/PLAN:     Patient is a 28 y.o. female with Type 1 diabetes mellitus with right eye affected by proliferative retinopathy and traction retinal detachment involving macula [E10.3521]  Type 1 diabetes mellitus with proliferative diabetic retinopathy with traction retinal detachment involving the macula, right eye [E10.3521].    - Plan for surgical correction with 25 ga SOR, PPV, MP, EL likely gas replacement OD.  - Allergies reviewed: Review of patient's allergies indicates:  No Known Allergies  - Risks/benefits/alternatives of the procedure including, but not limited to scarring, bleeding, infection, loss or decreased vision, and/or need for possible repeat surgery discussed with the patient and family.  - Informed consent obtained prior to surgery and the patient/family voiced good understanding.    Lama Mimi MD  [facility]

## 2023-03-21 NOTE — TRANSFER OF CARE
"Anesthesia Transfer of Care Note    Patient: Isabela Read    Procedure(s) Performed: Procedure(s) (LRB):  REPAIR,RETINAL DETACHMENT,COMPLEX,WITH VITRECTOMY AND MEMBRANE PEELING (Right)    Patient location: PACU    Anesthesia Type: general    Transport from OR: Transported from OR on 6-10 L/min O2 by face mask with adequate spontaneous ventilation    Post pain: adequate analgesia    Post assessment: no apparent anesthetic complications    Post vital signs: stable    Level of consciousness: awake    Nausea/Vomiting: no nausea/vomiting    Complications: none    Transfer of care protocol was followed      Last vitals:   Visit Vitals  BP (!) 138/97   Pulse 110   Temp 36.2 °C (97.2 °F) (Temporal)   Resp 18   Ht 5' 4" (1.626 m)   Wt 60.6 kg (133 lb 9.6 oz)   SpO2 100%   Breastfeeding No   BMI 22.93 kg/m²     "

## 2023-03-21 NOTE — BRIEF OP NOTE
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, right eye    Post Op Dx: Type 1 diabetes with proliferative retinopathy and combined rhegmatogenous and tractional retinal detachment, proliferative vitreoretinopathy, right eye    Procedure Performed: Repair of complex retinal detachment by pars plana vitrectomy, removal of silicone oil, membrane peel, injection of perfluorocarbon liquid, endolaser, injection of 5000cs silicone oil, right eye    Attending Surgeon: ARMIN Montaño    Assistant Surgeon: ALLEN Bobo    Anesthesia: Local/Mac, retrobulbar injection of 50/50 mixture 0.75% bupivicaine, 2% lidocaine    Estimated blood loss: Minimal    Complication: None    Specimen: None    Disposition: Stable to recovery    Findings: Macular and inferior preretinal and subretinal proliferative vitreoretinopathy membranes, inferior stretch holes    Outcome: Retina attached    Date of Discharge: 03/21/2023    Discharge Disposition: Home    F/U: 03/22/23

## 2023-03-22 ENCOUNTER — OFFICE VISIT (OUTPATIENT)
Dept: OPHTHALMOLOGY | Facility: CLINIC | Age: 28
End: 2023-03-22
Attending: OPHTHALMOLOGY
Payer: MEDICARE

## 2023-03-22 DIAGNOSIS — H35.21 PROLIFERATIVE VITREORETINOPATHY OF RIGHT EYE: ICD-10-CM

## 2023-03-22 DIAGNOSIS — E10.3541: Primary | ICD-10-CM

## 2023-03-22 PROCEDURE — 3066F PR DOCUMENTATION OF TREATMENT FOR NEPHROPATHY: ICD-10-PCS | Mod: CPTII,S$GLB,, | Performed by: OPHTHALMOLOGY

## 2023-03-22 PROCEDURE — 1159F PR MEDICATION LIST DOCUMENTED IN MEDICAL RECORD: ICD-10-PCS | Mod: CPTII,S$GLB,, | Performed by: OPHTHALMOLOGY

## 2023-03-22 PROCEDURE — 3044F HG A1C LEVEL LT 7.0%: CPT | Mod: CPTII,S$GLB,, | Performed by: OPHTHALMOLOGY

## 2023-03-22 PROCEDURE — 99999 PR PBB SHADOW E&M-EST. PATIENT-LVL III: CPT | Mod: PBBFAC,,, | Performed by: OPHTHALMOLOGY

## 2023-03-22 PROCEDURE — 99024 POSTOP FOLLOW-UP VISIT: CPT | Mod: S$GLB,,, | Performed by: OPHTHALMOLOGY

## 2023-03-22 PROCEDURE — 3066F NEPHROPATHY DOC TX: CPT | Mod: CPTII,S$GLB,, | Performed by: OPHTHALMOLOGY

## 2023-03-22 PROCEDURE — 3044F PR MOST RECENT HEMOGLOBIN A1C LEVEL <7.0%: ICD-10-PCS | Mod: CPTII,S$GLB,, | Performed by: OPHTHALMOLOGY

## 2023-03-22 PROCEDURE — 1159F MED LIST DOCD IN RCRD: CPT | Mod: CPTII,S$GLB,, | Performed by: OPHTHALMOLOGY

## 2023-03-22 PROCEDURE — 99024 PR POST-OP FOLLOW-UP VISIT: ICD-10-PCS | Mod: S$GLB,,, | Performed by: OPHTHALMOLOGY

## 2023-03-22 PROCEDURE — 99999 PR PBB SHADOW E&M-EST. PATIENT-LVL III: ICD-10-PCS | Mod: PBBFAC,,, | Performed by: OPHTHALMOLOGY

## 2023-03-22 NOTE — ANESTHESIA POSTPROCEDURE EVALUATION
Anesthesia Post Evaluation    Patient: Isabela Read had intermittent hypotension well controlled with pressors. Chart reviewed without evidence of prior cardiovascular pathology. Vitals and mentation stable in PACU.     Procedure(s) Performed: Procedure(s) (LRB):  REPAIR,RETINAL DETACHMENT,COMPLEX,WITH VITRECTOMY AND MEMBRANE PEELING (Right)  prior  Final Anesthesia Type: general      Patient location during evaluation: PACU  Patient participation: Yes- Able to Participate  Level of consciousness: awake and alert  Post-procedure vital signs: reviewed and stable  Pain management: adequate  Airway patency: patent    PONV status at discharge: No PONV  Anesthetic complications: no      Cardiovascular status: blood pressure returned to baseline  Respiratory status: unassisted  Hydration status: euvolemic  Follow-up not needed.          Vitals Value Taken Time   /51 03/21/23 1515   Temp 36.8 °C (98.2 °F) 03/21/23 1402   Pulse 110 03/21/23 1515   Resp 15 03/21/23 1515   SpO2 98 % 03/21/23 1515         No case tracking events are documented in the log.      Pain/Renny Score: Pain Rating Prior to Med Admin: 6 (3/21/2023  2:24 PM)  Pain Rating Post Med Admin: 3 (3/21/2023  3:00 PM)  Renny Score: 9 (3/21/2023  2:30 PM)

## 2023-03-22 NOTE — PROGRESS NOTES
Subjective:       Patient ID: Isabela Read is a 28 y.o. female      Chief Complaint   Patient presents with    Post-op Evaluation     History of Present Illness  HPI    Dls: 03/21/2023- Surgery Dr. Montaño     Pt is present for 1 Day post Op Retinal detachment OD   Pt states she Is feeling Ok, Not in any pain. Right eye is watery .  Did   not remove patch at all yesterday.   Did side to side positioning.  Did not do face down    Eye Meds:   None  Last edited by Maximilian Montaño MD on 3/23/2023  8:20 AM.          Assessment/Plan:     1. Type 1 diabetes mellitus with right eye affected by proliferative retinopathy and combined traction and rhegmatogenous retinal detachment  2. Proliferative vitreoretinopathy of right eye  Doing well POD#1 except SO in AC  PI patent.  Positioned in clinic face down for 20 min and inf AC began filling with aqueous and iris relaxing inf  PF 4/0  Vig 4/0  Atropine 1/0  Maxitrol matilde HS/0  No vigorous activity, no lifting, bending, etc.  Little shield sleeping  Little shield, glasses, or sunglasses all times for protection   No shower   Strict face down positioning  RD/Endophthalmitis precautions   RTC 1 day sooner PRN if any problems (laure pain, redness, dimming or loss of vision)      Follow up in about 1 day (around 3/23/2023), or if symptoms worsen or fail to improve, for Post-op. IOP and anterior chamber check

## 2023-03-23 ENCOUNTER — OFFICE VISIT (OUTPATIENT)
Dept: OPHTHALMOLOGY | Facility: CLINIC | Age: 28
End: 2023-03-23
Payer: MEDICARE

## 2023-03-23 DIAGNOSIS — E10.3541: Primary | ICD-10-CM

## 2023-03-23 PROCEDURE — 99999 PR PBB SHADOW E&M-EST. PATIENT-LVL III: ICD-10-PCS | Mod: PBBFAC,,, | Performed by: OPHTHALMOLOGY

## 2023-03-23 PROCEDURE — 99024 POSTOP FOLLOW-UP VISIT: CPT | Mod: S$GLB,,, | Performed by: OPHTHALMOLOGY

## 2023-03-23 PROCEDURE — 3066F PR DOCUMENTATION OF TREATMENT FOR NEPHROPATHY: ICD-10-PCS | Mod: CPTII,S$GLB,, | Performed by: OPHTHALMOLOGY

## 2023-03-23 PROCEDURE — 3044F HG A1C LEVEL LT 7.0%: CPT | Mod: CPTII,S$GLB,, | Performed by: OPHTHALMOLOGY

## 2023-03-23 PROCEDURE — 1159F PR MEDICATION LIST DOCUMENTED IN MEDICAL RECORD: ICD-10-PCS | Mod: CPTII,S$GLB,, | Performed by: OPHTHALMOLOGY

## 2023-03-23 PROCEDURE — 3066F NEPHROPATHY DOC TX: CPT | Mod: CPTII,S$GLB,, | Performed by: OPHTHALMOLOGY

## 2023-03-23 PROCEDURE — 1160F PR REVIEW ALL MEDS BY PRESCRIBER/CLIN PHARMACIST DOCUMENTED: ICD-10-PCS | Mod: CPTII,S$GLB,, | Performed by: OPHTHALMOLOGY

## 2023-03-23 PROCEDURE — 3044F PR MOST RECENT HEMOGLOBIN A1C LEVEL <7.0%: ICD-10-PCS | Mod: CPTII,S$GLB,, | Performed by: OPHTHALMOLOGY

## 2023-03-23 PROCEDURE — 1159F MED LIST DOCD IN RCRD: CPT | Mod: CPTII,S$GLB,, | Performed by: OPHTHALMOLOGY

## 2023-03-23 PROCEDURE — 1160F RVW MEDS BY RX/DR IN RCRD: CPT | Mod: CPTII,S$GLB,, | Performed by: OPHTHALMOLOGY

## 2023-03-23 PROCEDURE — 99024 PR POST-OP FOLLOW-UP VISIT: ICD-10-PCS | Mod: S$GLB,,, | Performed by: OPHTHALMOLOGY

## 2023-03-23 PROCEDURE — 99999 PR PBB SHADOW E&M-EST. PATIENT-LVL III: CPT | Mod: PBBFAC,,, | Performed by: OPHTHALMOLOGY

## 2023-03-23 RX ORDER — AMOXICILLIN 500 MG/1
TABLET, FILM COATED ORAL
COMMUNITY
Start: 2023-02-17 | End: 2023-05-15 | Stop reason: ALTCHOICE

## 2023-03-23 NOTE — PROGRESS NOTES
Subjective:       Patient ID: Isabela Read is a 28 y.o. female      Chief Complaint   Patient presents with    Post-op Evaluation     History of Present Illness  HPI    No pain.  Vision stable.    Last edited by Maximilian Montaño MD on 3/24/2023 12:09 PM.          Assessment/Plan:     1. Type 1 diabetes mellitus with right eye affected by proliferative retinopathy and combined traction and rhegmatogenous retinal detachment  2. Proliferative vitreoretinopathy of right eye  Doing well POD#2 except SO in AC  IOP still WNL  PI patent but with fibrotic capsule behind, possibly some fibrin in PI.   Etiologies of normal IOP could be posterior uveoscleral outflow or CB shutdown.  Recommend PI revision today to try to get SO behind pupil and prevent IOP spike.  Pt also had eval with gl service who agrees with PI and performed procedure as below note:  Change gtts:  PF q 2h/0  Vig 4/0  Atropine 1/0  Maxitrol matilde HS/0  No vigorous activity, no lifting, bending, etc.  Little shield sleeping  Little shield, glasses, or sunglasses all times for protection   No shower   Strict face down positioning  RD/Endophthalmitis precautions   RTC 1 week sooner PRN if any problems (laure pain, redness, dimming or loss of vision)      Follow up in about 1 week (around 3/30/2023), or if symptoms worsen or fail to improve, for Post-op. IOP and anterior chamber check            Procedure note: Laser peripheral iridotomy  Indication: inferior peripheral iridotomy in setting of silicone oil fill in an aphakic eye, right eye  Attending: Jose Ivy MD  Fellow: Ancelmo Park MD  Procedure details:  Risks, benefits, and alternatives were discussed with the patient.  All questions were answered.  Patient wished to proceed with the procedure.  Consent was obtained. Proparacaine was instilled in the eye for anesthesia. Matty LPI lens was placed with Goniosol.  Laser settings used were as follows:   Type of laser: YAG   Shots:  84   Energy  per shot:  3.4 mJ   Total Energy  285.6 mJ  The patient tolerated the procedure well without complications. Patient instructed to increase PF to 8x daily and face down position.  Return precautions were discussed.

## 2023-03-30 ENCOUNTER — OFFICE VISIT (OUTPATIENT)
Dept: OPHTHALMOLOGY | Facility: CLINIC | Age: 28
End: 2023-03-30
Payer: MEDICARE

## 2023-03-30 DIAGNOSIS — E10.3541: Primary | ICD-10-CM

## 2023-03-30 PROCEDURE — 3066F NEPHROPATHY DOC TX: CPT | Mod: CPTII,S$GLB,, | Performed by: OPHTHALMOLOGY

## 2023-03-30 PROCEDURE — 99024 PR POST-OP FOLLOW-UP VISIT: ICD-10-PCS | Mod: S$GLB,,, | Performed by: OPHTHALMOLOGY

## 2023-03-30 PROCEDURE — 3066F PR DOCUMENTATION OF TREATMENT FOR NEPHROPATHY: ICD-10-PCS | Mod: CPTII,S$GLB,, | Performed by: OPHTHALMOLOGY

## 2023-03-30 PROCEDURE — 99024 POSTOP FOLLOW-UP VISIT: CPT | Mod: S$GLB,,, | Performed by: OPHTHALMOLOGY

## 2023-03-30 PROCEDURE — 3044F HG A1C LEVEL LT 7.0%: CPT | Mod: CPTII,S$GLB,, | Performed by: OPHTHALMOLOGY

## 2023-03-30 PROCEDURE — 3044F PR MOST RECENT HEMOGLOBIN A1C LEVEL <7.0%: ICD-10-PCS | Mod: CPTII,S$GLB,, | Performed by: OPHTHALMOLOGY

## 2023-03-30 PROCEDURE — 99999 PR PBB SHADOW E&M-EST. PATIENT-LVL III: CPT | Mod: PBBFAC,,, | Performed by: OPHTHALMOLOGY

## 2023-03-30 PROCEDURE — 99999 PR PBB SHADOW E&M-EST. PATIENT-LVL III: ICD-10-PCS | Mod: PBBFAC,,, | Performed by: OPHTHALMOLOGY

## 2023-03-30 PROCEDURE — 1159F PR MEDICATION LIST DOCUMENTED IN MEDICAL RECORD: ICD-10-PCS | Mod: CPTII,S$GLB,, | Performed by: OPHTHALMOLOGY

## 2023-03-30 PROCEDURE — 1159F MED LIST DOCD IN RCRD: CPT | Mod: CPTII,S$GLB,, | Performed by: OPHTHALMOLOGY

## 2023-03-30 NOTE — PROGRESS NOTES
Subjective:       Patient ID: Isabela Read is a 28 y.o. female      Chief Complaint   Patient presents with    Post-op Evaluation     History of Present Illness  HPI    Dls: 03/23/2023- Surgery Dr. Montaño     Pt is present for 1 wk post Op Retinal detachment OD. Pt stated that she   is doing well and there have been no changes in vision. Pt states she is   light sensitive and OD conner when exposed to light.   Pt denies eye pain,   floaters, and flashes.  Did some face down for a few days but hasn't been doing it for a few days      Eye Meds:  PF OD 8x a day  Moxi OD 4x a day  Atropine 1/0  Last edited by Maximilian Montaño MD on 4/2/2023  6:31 PM.          Assessment/Plan:     1. Type 1 diabetes mellitus with right eye affected by proliferative retinopathy and combined traction and rhegmatogenous retinal detachment  2. Proliferative vitreoretinopathy of right eye  Doing well POD#9  Retina attached   SO behind pupil  IOP WNL  PI patent     Change gtts:    PF 4/0  Vig 4/0 until runs out  Atropine 1/0    Face tilted forward as much as possible.    Do not lie flat on back.  Alt side positioning or face down for sleeping  RD/Endophthalmitis precautions   RTC 2 week sooner PRN if any problems (laure pain, redness, dimming or loss of vision)      Follow up in about 2 weeks (around 4/13/2023), or if symptoms worsen or fail to improve, for Post-op. IOP and anterior chamber check              2 wks

## 2023-04-03 ENCOUNTER — LAB VISIT (OUTPATIENT)
Dept: LAB | Facility: HOSPITAL | Age: 28
End: 2023-04-03
Attending: INTERNAL MEDICINE
Payer: MEDICARE

## 2023-04-03 ENCOUNTER — TELEPHONE (OUTPATIENT)
Dept: TRANSPLANT | Facility: CLINIC | Age: 28
End: 2023-04-03
Payer: MEDICARE

## 2023-04-03 DIAGNOSIS — Z94.83 STATUS POST PANCREAS TRANSPLANTATION: ICD-10-CM

## 2023-04-03 DIAGNOSIS — Z94.0 KIDNEY REPLACED BY TRANSPLANT: ICD-10-CM

## 2023-04-03 LAB
ALBUMIN SERPL BCP-MCNC: 4.1 G/DL (ref 3.5–5.2)
AMYLASE SERPL-CCNC: 111 U/L (ref 20–110)
ANION GAP SERPL CALC-SCNC: 8 MMOL/L (ref 8–16)
BASOPHILS # BLD AUTO: 0.03 K/UL (ref 0–0.2)
BASOPHILS NFR BLD: 0.6 % (ref 0–1.9)
BUN SERPL-MCNC: 27 MG/DL (ref 6–20)
CALCIUM SERPL-MCNC: 10.1 MG/DL (ref 8.7–10.5)
CHLORIDE SERPL-SCNC: 111 MMOL/L (ref 95–110)
CO2 SERPL-SCNC: 19 MMOL/L (ref 23–29)
CREAT SERPL-MCNC: 0.9 MG/DL (ref 0.5–1.4)
DIFFERENTIAL METHOD: ABNORMAL
EOSINOPHIL # BLD AUTO: 0.1 K/UL (ref 0–0.5)
EOSINOPHIL NFR BLD: 2 % (ref 0–8)
ERYTHROCYTE [DISTWIDTH] IN BLOOD BY AUTOMATED COUNT: 13.6 % (ref 11.5–14.5)
EST. GFR  (NO RACE VARIABLE): >60 ML/MIN/1.73 M^2
GLUCOSE SERPL-MCNC: 82 MG/DL (ref 70–110)
HCT VFR BLD AUTO: 48.7 % (ref 37–48.5)
HGB BLD-MCNC: 14.8 G/DL (ref 12–16)
IMM GRANULOCYTES # BLD AUTO: 0.01 K/UL (ref 0–0.04)
IMM GRANULOCYTES NFR BLD AUTO: 0.2 % (ref 0–0.5)
LIPASE SERPL-CCNC: 21 U/L (ref 4–60)
LYMPHOCYTES # BLD AUTO: 1.1 K/UL (ref 1–4.8)
LYMPHOCYTES NFR BLD: 20.5 % (ref 18–48)
MAGNESIUM SERPL-MCNC: 1.9 MG/DL (ref 1.6–2.6)
MCH RBC QN AUTO: 30.8 PG (ref 27–31)
MCHC RBC AUTO-ENTMCNC: 30.4 G/DL (ref 32–36)
MCV RBC AUTO: 102 FL (ref 82–98)
MONOCYTES # BLD AUTO: 0.7 K/UL (ref 0.3–1)
MONOCYTES NFR BLD: 12.7 % (ref 4–15)
NEUTROPHILS # BLD AUTO: 3.3 K/UL (ref 1.8–7.7)
NEUTROPHILS NFR BLD: 64 % (ref 38–73)
NRBC BLD-RTO: 0 /100 WBC
PHOSPHATE SERPL-MCNC: 3.9 MG/DL (ref 2.7–4.5)
PLATELET # BLD AUTO: 271 K/UL (ref 150–450)
PMV BLD AUTO: 10.4 FL (ref 9.2–12.9)
POTASSIUM SERPL-SCNC: 4.6 MMOL/L (ref 3.5–5.1)
RBC # BLD AUTO: 4.8 M/UL (ref 4–5.4)
SODIUM SERPL-SCNC: 138 MMOL/L (ref 136–145)
TACROLIMUS BLD-MCNC: 12.6 NG/ML (ref 5–15)
WBC # BLD AUTO: 5.11 K/UL (ref 3.9–12.7)

## 2023-04-03 PROCEDURE — 36415 COLL VENOUS BLD VENIPUNCTURE: CPT | Performed by: INTERNAL MEDICINE

## 2023-04-03 PROCEDURE — 85025 COMPLETE CBC W/AUTO DIFF WBC: CPT | Performed by: INTERNAL MEDICINE

## 2023-04-03 PROCEDURE — 80069 RENAL FUNCTION PANEL: CPT | Performed by: INTERNAL MEDICINE

## 2023-04-03 PROCEDURE — 82150 ASSAY OF AMYLASE: CPT | Performed by: INTERNAL MEDICINE

## 2023-04-03 PROCEDURE — 80197 ASSAY OF TACROLIMUS: CPT | Performed by: INTERNAL MEDICINE

## 2023-04-03 PROCEDURE — 83690 ASSAY OF LIPASE: CPT | Performed by: INTERNAL MEDICINE

## 2023-04-03 PROCEDURE — 83735 ASSAY OF MAGNESIUM: CPT | Performed by: INTERNAL MEDICINE

## 2023-04-03 NOTE — TELEPHONE ENCOUNTER
My chart message sent.     ----- Message from Tammie Broussard DO sent at 4/3/2023  1:40 PM CDT -----  Acceptable pancreatic enzymes  Baseline kidney graft function  High FK. Hold dose tonight and decrease FK to 4 mg BID from 4.5 mg BID

## 2023-04-10 ENCOUNTER — LAB VISIT (OUTPATIENT)
Dept: LAB | Facility: HOSPITAL | Age: 28
End: 2023-04-10
Attending: INTERNAL MEDICINE
Payer: MEDICARE

## 2023-04-10 DIAGNOSIS — Z94.0 KIDNEY REPLACED BY TRANSPLANT: ICD-10-CM

## 2023-04-10 DIAGNOSIS — Z94.83 STATUS POST PANCREAS TRANSPLANTATION: ICD-10-CM

## 2023-04-10 LAB — TACROLIMUS BLD-MCNC: 8.1 NG/ML (ref 5–15)

## 2023-04-10 PROCEDURE — 36415 COLL VENOUS BLD VENIPUNCTURE: CPT | Performed by: INTERNAL MEDICINE

## 2023-04-10 PROCEDURE — 80197 ASSAY OF TACROLIMUS: CPT | Performed by: INTERNAL MEDICINE

## 2023-04-12 ENCOUNTER — OFFICE VISIT (OUTPATIENT)
Dept: DERMATOLOGY | Facility: CLINIC | Age: 28
End: 2023-04-12
Payer: MEDICARE

## 2023-04-12 DIAGNOSIS — L70.9 ACNE, UNSPECIFIED ACNE TYPE: Primary | ICD-10-CM

## 2023-04-12 DIAGNOSIS — L85.8 KP (KERATOSIS PILARIS): ICD-10-CM

## 2023-04-12 DIAGNOSIS — L73.9 FOLLICULITIS: ICD-10-CM

## 2023-04-12 DIAGNOSIS — L21.9 SEBORRHEIC DERMATITIS: ICD-10-CM

## 2023-04-12 DIAGNOSIS — Z76.89 ENCOUNTER FOR SKIN CARE: ICD-10-CM

## 2023-04-12 PROCEDURE — 99999 PR PBB SHADOW E&M-EST. PATIENT-LVL II: ICD-10-PCS | Mod: PBBFAC,,, | Performed by: DERMATOLOGY

## 2023-04-12 PROCEDURE — 3044F PR MOST RECENT HEMOGLOBIN A1C LEVEL <7.0%: ICD-10-PCS | Mod: CPTII,S$GLB,, | Performed by: DERMATOLOGY

## 2023-04-12 PROCEDURE — 1159F MED LIST DOCD IN RCRD: CPT | Mod: CPTII,S$GLB,, | Performed by: DERMATOLOGY

## 2023-04-12 PROCEDURE — 1160F RVW MEDS BY RX/DR IN RCRD: CPT | Mod: CPTII,S$GLB,, | Performed by: DERMATOLOGY

## 2023-04-12 PROCEDURE — 1160F PR REVIEW ALL MEDS BY PRESCRIBER/CLIN PHARMACIST DOCUMENTED: ICD-10-PCS | Mod: CPTII,S$GLB,, | Performed by: DERMATOLOGY

## 2023-04-12 PROCEDURE — 1159F PR MEDICATION LIST DOCUMENTED IN MEDICAL RECORD: ICD-10-PCS | Mod: CPTII,S$GLB,, | Performed by: DERMATOLOGY

## 2023-04-12 PROCEDURE — 99204 PR OFFICE/OUTPT VISIT, NEW, LEVL IV, 45-59 MIN: ICD-10-PCS | Mod: S$GLB,,, | Performed by: DERMATOLOGY

## 2023-04-12 PROCEDURE — 3066F NEPHROPATHY DOC TX: CPT | Mod: CPTII,S$GLB,, | Performed by: DERMATOLOGY

## 2023-04-12 PROCEDURE — 3044F HG A1C LEVEL LT 7.0%: CPT | Mod: CPTII,S$GLB,, | Performed by: DERMATOLOGY

## 2023-04-12 PROCEDURE — 99204 OFFICE O/P NEW MOD 45 MIN: CPT | Mod: S$GLB,,, | Performed by: DERMATOLOGY

## 2023-04-12 PROCEDURE — 3066F PR DOCUMENTATION OF TREATMENT FOR NEPHROPATHY: ICD-10-PCS | Mod: CPTII,S$GLB,, | Performed by: DERMATOLOGY

## 2023-04-12 PROCEDURE — 99999 PR PBB SHADOW E&M-EST. PATIENT-LVL II: CPT | Mod: PBBFAC,,, | Performed by: DERMATOLOGY

## 2023-04-12 RX ORDER — KETOCONAZOLE 20 MG/G
CREAM TOPICAL 2 TIMES DAILY
Qty: 60 G | Refills: 1 | Status: SHIPPED | OUTPATIENT
Start: 2023-04-12 | End: 2023-08-17 | Stop reason: SDUPTHER

## 2023-04-12 NOTE — PATIENT INSTRUCTIONS
No hot water bathing reviewed.     Etiology of keratosis pilaris discussed and explained the dry skin plugging of the pores.  Recommended salicylic acid washes for now with avoidance of excessively hot water bathing on affected areas.  Use a scrub brush and a loofa.  Can consider moisturizers and natural oils later - try coconut oil.  Consider gluten free diet as it may help.

## 2023-04-12 NOTE — PROGRESS NOTES
Subjective:      Patient ID:  Isabela Read is a 28 y.o. female who presents for   Chief Complaint   Patient presents with    Rash     Face and chest     Rash - Initial  Affected locations: face and chest  Duration: 5 months  Signs / symptoms: dryness, burning, itching, spreading and inflamed  Aggravated by: nothing  Relieving factors/Treatments tried: OTC moisturizer and OTC hydrocortisone  Improvement on treatment: mild    Review of Systems   Constitutional:  Negative for fever.   HENT:  Negative for sore throat.    Respiratory:  Negative for cough.    Skin:  Positive for itching, rash and dry skin.     Objective:   Physical Exam   Constitutional: She appears well-developed and well-nourished. No distress.   Eyes: No conjunctival no injection.   Neurological: She is alert and oriented to person, place, and time. She is not disoriented.   Psychiatric: She has a normal mood and affect.   Skin:   Areas Examined (abnormalities noted in diagram):   Head / Face Inspection Performed  Chest / Axilla Inspection Performed               Diagram Legend     Erythematous scaling macule/papule c/w actinic keratosis       Vascular papule c/w angioma      Pigmented verrucoid papule/plaque c/w seborrheic keratosis      Yellow umbilicated papule c/w sebaceous hyperplasia      Irregularly shaped tan macule c/w lentigo     1-2 mm smooth white papules consistent with Milia      Movable subcutaneous cyst with punctum c/w epidermal inclusion cyst      Subcutaneous movable cyst c/w pilar cyst      Firm pink to brown papule c/w dermatofibroma      Pedunculated fleshy papule(s) c/w skin tag(s)      Evenly pigmented macule c/w junctional nevus     Mildly variegated pigmented, slightly irregular-bordered macule c/w mildly atypical nevus      Flesh colored to evenly pigmented papule c/w intradermal nevus       Pink pearly papule/plaque c/w basal cell carcinoma      Erythematous hyperkeratotic cursted plaque c/w SCC      Surgical  scar with no sign of skin cancer recurrence      Open and closed comedones      Inflammatory papules and pustules      Verrucoid papule consistent consistent with wart     Erythematous eczematous patches and plaques     Dystrophic onycholytic nail with subungual debris c/w onychomycosis     Umbilicated papule    Erythematous-base heme-crusted tan verrucoid plaque consistent with inflamed seborrheic keratosis     Erythematous Silvery Scaling Plaque c/w Psoriasis     See annotation      Assessment / Plan:        Acne, unspecified acne type  Recommended a salicylic acid OTC wash to be used regularly for acne, keratosis pilaris, actinic keratoses, or as needed for oil control.  Use with wash cloth with some rubbing but not abrasively.     Consider RA later?  Proper application of medications and or care for affected area(s) and condition(s) reviewed.    Seborrheic dermatitis  -     ketoconazole (NIZORAL) 2 % cream; Apply topically 2 (two) times daily. Prn dry areas of face  Dispense: 60 g; Refill: 1  Discussed with patient the etiology and pathogenesis of the disease or skin lesion(s) and possible treatments and aggravators.    Proper application of medications and or care for affected area(s) and condition(s) reviewed.  Previous [facility] labs and or records and notes reviewed and considered for their impact on our clinical decision making today.    KP (keratosis pilaris)  Etiology of keratosis pilaris discussed and explained the dry skin plugging of the pores.  Recommended salicylic acid washes for now with avoidance of excessively hot water bathing on affected areas.  Use a scrub brush and a loofa.  Can consider moisturizers and natural oils later.  Consider gluten free diet as it may help.    Encounter for skin care  No hot water bathing reviewed.  Stop all applicable skin care and hair products, chemicals, perfumes, and treatments to affected areas.  No deodorants and antiperspirants, if applicable.  No store bought  lotions or potions.       Folliculitis  Recommended a salicylic acid OTC wash to be used regularly for acne, keratosis pilaris, actinic keratoses, or as needed for oil control.  Use with wash cloth with some rubbing but not abrasively.   Could be from po pred.      Kidney transplant  On po pred and cellcept.         Follow up in about 4 weeks (around 5/10/2023).

## 2023-04-16 ENCOUNTER — PATIENT MESSAGE (OUTPATIENT)
Dept: TRANSPLANT | Facility: CLINIC | Age: 28
End: 2023-04-16
Payer: MEDICARE

## 2023-04-21 ENCOUNTER — OFFICE VISIT (OUTPATIENT)
Dept: INTERNAL MEDICINE | Facility: CLINIC | Age: 28
End: 2023-04-21
Attending: FAMILY MEDICINE
Payer: MEDICARE

## 2023-04-21 DIAGNOSIS — Z94.0 STATUS POST DECEASED-DONOR KIDNEY TRANSPLANTATION: ICD-10-CM

## 2023-04-21 DIAGNOSIS — J84.9 INTERSTITIAL PULMONARY DISEASE, UNSPECIFIED: ICD-10-CM

## 2023-04-21 DIAGNOSIS — N10 PYELONEPHRITIS, ACUTE: ICD-10-CM

## 2023-04-21 DIAGNOSIS — R20.2 NUMBNESS AND TINGLING IN BOTH HANDS: Primary | ICD-10-CM

## 2023-04-21 DIAGNOSIS — R20.0 NUMBNESS AND TINGLING IN BOTH HANDS: Primary | ICD-10-CM

## 2023-04-21 DIAGNOSIS — Z86.39 HISTORY OF DIABETES MELLITUS, TYPE I: ICD-10-CM

## 2023-04-21 PROBLEM — D84.9 IMMUNOSUPPRESSION: Status: ACTIVE | Noted: 2023-04-21

## 2023-04-21 PROBLEM — A41.51 SEPSIS DUE TO ESCHERICHIA COLI: Status: RESOLVED | Noted: 2023-01-23 | Resolved: 2023-04-21

## 2023-04-21 PROBLEM — B96.20 BACTEREMIA DUE TO ESCHERICHIA COLI: Status: RESOLVED | Noted: 2023-01-23 | Resolved: 2023-04-21

## 2023-04-21 PROBLEM — R65.20 SEVERE SEPSIS: Status: RESOLVED | Noted: 2022-11-25 | Resolved: 2023-04-21

## 2023-04-21 PROBLEM — D84.9 IMMUNOSUPPRESSION: Status: RESOLVED | Noted: 2023-04-21 | Resolved: 2023-04-21

## 2023-04-21 PROBLEM — I95.1 ORTHOSTATIC HYPOTENSION: Status: RESOLVED | Noted: 2023-01-25 | Resolved: 2023-04-21

## 2023-04-21 PROBLEM — R78.81 BACTEREMIA DUE TO ESCHERICHIA COLI: Status: RESOLVED | Noted: 2023-01-23 | Resolved: 2023-04-21

## 2023-04-21 PROBLEM — A41.9 SEVERE SEPSIS: Status: RESOLVED | Noted: 2022-11-25 | Resolved: 2023-04-21

## 2023-04-21 PROCEDURE — 3044F PR MOST RECENT HEMOGLOBIN A1C LEVEL <7.0%: ICD-10-PCS | Mod: CPTII,95,, | Performed by: FAMILY MEDICINE

## 2023-04-21 PROCEDURE — 1159F PR MEDICATION LIST DOCUMENTED IN MEDICAL RECORD: ICD-10-PCS | Mod: CPTII,95,, | Performed by: FAMILY MEDICINE

## 2023-04-21 PROCEDURE — 99213 OFFICE O/P EST LOW 20 MIN: CPT | Mod: 95,,, | Performed by: FAMILY MEDICINE

## 2023-04-21 PROCEDURE — 3044F HG A1C LEVEL LT 7.0%: CPT | Mod: CPTII,95,, | Performed by: FAMILY MEDICINE

## 2023-04-21 PROCEDURE — 3066F PR DOCUMENTATION OF TREATMENT FOR NEPHROPATHY: ICD-10-PCS | Mod: CPTII,95,, | Performed by: FAMILY MEDICINE

## 2023-04-21 PROCEDURE — 1160F RVW MEDS BY RX/DR IN RCRD: CPT | Mod: CPTII,95,, | Performed by: FAMILY MEDICINE

## 2023-04-21 PROCEDURE — 1160F PR REVIEW ALL MEDS BY PRESCRIBER/CLIN PHARMACIST DOCUMENTED: ICD-10-PCS | Mod: CPTII,95,, | Performed by: FAMILY MEDICINE

## 2023-04-21 PROCEDURE — 1159F MED LIST DOCD IN RCRD: CPT | Mod: CPTII,95,, | Performed by: FAMILY MEDICINE

## 2023-04-21 PROCEDURE — 99213 PR OFFICE/OUTPT VISIT, EST, LEVL III, 20-29 MIN: ICD-10-PCS | Mod: 95,,, | Performed by: FAMILY MEDICINE

## 2023-04-21 PROCEDURE — 3066F NEPHROPATHY DOC TX: CPT | Mod: CPTII,95,, | Performed by: FAMILY MEDICINE

## 2023-04-21 NOTE — PROGRESS NOTES
Subjective:       Patient ID: Isabela Read is a 28 y.o. female.    Chief Complaint: No chief complaint on file.    The patient location is:  Automobile  The chief complaint leading to consultation is:  Numbness and tingling fingers, bilateral    Visit type: audiovisual    Face to Face time with patient: 10  20 minutes of total time spent on the encounter, which includes face to face time and non-face to face time preparing to see the patient (eg, review of tests), Obtaining and/or reviewing separately obtained history, Documenting clinical information in the electronic or other health record, Independently interpreting results (not separately reported) and communicating results to the patient/family/caregiver, or Care coordination (not separately reported).         Each patient to whom he or she provides medical services by telemedicine is:  (1) informed of the relationship between the physician and patient and the respective role of any other health care provider with respect to management of the patient; and (2) notified that he or she may decline to receive medical services by telemedicine and may withdraw from such care at any time.    Notes:  Patient reports 1 or 2 week history numbness and tingling both hands.  Seems to be more of a median distribution.  History of previous type 1 diabetes but status post pancreatic/renal transplant with normalization of laboratory and no longer diabetic status.  No current diabetes medications, but now on multiple medications including immunosuppression, etc..  Does have some tingling in her toes from time to time as well.  That is an old finding.  No motor loss.  No neck pain.    Chart reviewed, admitted for pyelonephritis a couple of months ago all symptoms resolved.  Follows a transplant and Endocrinology.        Review of Systems   Constitutional:  Negative for activity change and unexpected weight change.   HENT:  Negative for hearing loss, rhinorrhea and  trouble swallowing.    Eyes:  Negative for discharge and visual disturbance.   Respiratory:  Negative for chest tightness and wheezing.    Cardiovascular:  Negative for chest pain and palpitations.   Gastrointestinal:  Negative for blood in stool, constipation, diarrhea and vomiting.   Endocrine: Negative for polydipsia and polyuria.   Genitourinary:  Negative for difficulty urinating, dysuria, hematuria and menstrual problem.   Musculoskeletal:  Negative for arthralgias, joint swelling and neck pain.   Neurological:  Positive for numbness. Negative for weakness and headaches.   Psychiatric/Behavioral:  Negative for confusion and dysphoric mood.      Objective:      Physical Exam  Constitutional:       General: She is not in acute distress.     Appearance: She is well-developed.   Neurological:      Mental Status: She is alert and oriented to person, place, and time.   Psychiatric:         Speech: Speech normal.         Behavior: Behavior normal.       Assessment:       1. Numbness and tingling in both hands    2. History of diabetes mellitus, type I    3. Pyelonephritis, acute    4. Status post -donor simultaneous pancreas kidney transplantation    5. Interstitial pulmonary disease, unspecified        Plan:     Medication List with Changes/Refills   Current Medications    ACETAMINOPHEN (TYLENOL) 325 MG TABLET    Take 1 tablet (325 mg total) by mouth every 6 (six) hours as needed for Pain.    AMOXICILLIN (AMOXIL) 500 MG TAB        ASPIRIN (ECOTRIN) 81 MG EC TABLET    Take 81 mg by mouth once daily.    ERGOCALCIFEROL (ERGOCALCIFEROL) 50,000 UNIT CAP    Take 1 capsule (50,000 Units total) by mouth every 7 days.    KETOCONAZOLE (NIZORAL) 2 % CREAM    Apply topically 2 (two) times daily as needed for dry areas of face    MEDROXYPROGESTERONE (DEPO-PROVERA) 150 MG/ML SYRG    Inject 1 mL (150 mg total) into the muscle once. for 1 dose    MIDODRINE (PROAMATINE) 5 MG TAB    Take 1 tablet (5 mg total) by mouth 3  (three) times daily.    MULTIVITAMIN (THERAGRAN) PER TABLET    Take 1 tablet by mouth once daily.    MYCOPHENOLATE (CELLCEPT) 250 MG CAP    Take 2 capsules (500 mg total) by mouth 2 (two) times daily.    ONDANSETRON (ZOFRAN-ODT) 8 MG TBDL    DISSOLVE 1 tablet (8 mg total) by mouth every 8 (eight) hours as needed (nausea).    PREDNISONE (DELTASONE) 5 MG TABLET    Take 1 tablet (5 mg total) by mouth once daily.    PROMETHAZINE (PHENERGAN) 12.5 MG TAB    Take 1 tablet (12.5 mg total) by mouth every 6 (six) hours as needed (nausea).    ROSUVASTATIN (CRESTOR) 10 MG TABLET    Take 1 tablet (10 mg total) by mouth every evening.    SODIUM BICARBONATE 650 MG TABLET    Take 2 tablets (1,300 mg total) by mouth 2 (two) times daily.    SULFAMETHOXAZOLE-TRIMETHOPRIM 400-80MG (BACTRIM,SEPTRA) 400-80 MG PER TABLET    Take 1 tablet by mouth once daily. Stop 2023    TACROLIMUS (PROGRAF) 1 MG CAP    Take 4 capsules (4 mg total) by mouth every morning AND 4 capsules (4 mg total) every evening.    VENLAFAXINE (EFFEXOR XR) 37.5 MG 24 HR CAPSULE    Take 1 capsule (37.5 mg total) by mouth once daily.     1. Numbness and tingling in both hands  -     Ambulatory referral/consult to Orthopedics; Future; Expected date: 2023    2. History of diabetes mellitus, type I  Overview:  Hyperglycemia and medication requirements resolved after pancreas transplantation 2022      3. Pyelonephritis, acute  Comments:  Recent hospitalization, event resolved  Overview:  Resolved, archive problem list entry      4. Status post -donor simultaneous pancreas kidney transplantation  Overview:  2022      5. Interstitial pulmonary disease, unspecified  Assessment & Plan:  Previously evaluated by Pulmonary Medicine        See meds, orders, follow up, routing and instructions sections of encounter and AVS. Discussed with patient and provided on AVS.      Uncertain etiology.  Could be peripheral neuropathy, could be carpal tunnel.  At  patient request will consult Orthopedics.  Consider EMG nerve conduction study, however was symptoms only a couple of weeks may defer for the time being.  Keep follow-ups with transplant service.

## 2023-04-27 NOTE — PROGRESS NOTES
Medroxyprogesterone 150mg/ml was administered today on (4.27.23).   The patient recieved the shot in their (R arm) at (10.50a)  Lot: [id number]  Exp: 02/25     Patient due to return for next one: 7/13/23- 7/27/23  Administered by Cristal Navas PharmD.  [id number]  Exp: 12/31/2023

## 2023-05-08 ENCOUNTER — PATIENT MESSAGE (OUTPATIENT)
Dept: OPHTHALMOLOGY | Facility: CLINIC | Age: 28
End: 2023-05-08
Payer: MEDICARE

## 2023-05-11 ENCOUNTER — OFFICE VISIT (OUTPATIENT)
Dept: OPHTHALMOLOGY | Facility: CLINIC | Age: 28
End: 2023-05-11
Payer: MEDICARE

## 2023-05-11 DIAGNOSIS — E10.3541: Primary | ICD-10-CM

## 2023-05-11 PROCEDURE — 99999 PR PBB SHADOW E&M-EST. PATIENT-LVL III: ICD-10-PCS | Mod: PBBFAC,,, | Performed by: OPHTHALMOLOGY

## 2023-05-11 PROCEDURE — 3066F NEPHROPATHY DOC TX: CPT | Mod: CPTII,S$GLB,, | Performed by: OPHTHALMOLOGY

## 2023-05-11 PROCEDURE — 3044F HG A1C LEVEL LT 7.0%: CPT | Mod: CPTII,S$GLB,, | Performed by: OPHTHALMOLOGY

## 2023-05-11 PROCEDURE — 1160F PR REVIEW ALL MEDS BY PRESCRIBER/CLIN PHARMACIST DOCUMENTED: ICD-10-PCS | Mod: CPTII,S$GLB,, | Performed by: OPHTHALMOLOGY

## 2023-05-11 PROCEDURE — 99024 PR POST-OP FOLLOW-UP VISIT: ICD-10-PCS | Mod: S$GLB,,, | Performed by: OPHTHALMOLOGY

## 2023-05-11 PROCEDURE — 1159F PR MEDICATION LIST DOCUMENTED IN MEDICAL RECORD: ICD-10-PCS | Mod: CPTII,S$GLB,, | Performed by: OPHTHALMOLOGY

## 2023-05-11 PROCEDURE — 3044F PR MOST RECENT HEMOGLOBIN A1C LEVEL <7.0%: ICD-10-PCS | Mod: CPTII,S$GLB,, | Performed by: OPHTHALMOLOGY

## 2023-05-11 PROCEDURE — 1159F MED LIST DOCD IN RCRD: CPT | Mod: CPTII,S$GLB,, | Performed by: OPHTHALMOLOGY

## 2023-05-11 PROCEDURE — 99999 PR PBB SHADOW E&M-EST. PATIENT-LVL III: CPT | Mod: PBBFAC,,, | Performed by: OPHTHALMOLOGY

## 2023-05-11 PROCEDURE — 1160F RVW MEDS BY RX/DR IN RCRD: CPT | Mod: CPTII,S$GLB,, | Performed by: OPHTHALMOLOGY

## 2023-05-11 PROCEDURE — 99024 POSTOP FOLLOW-UP VISIT: CPT | Mod: S$GLB,,, | Performed by: OPHTHALMOLOGY

## 2023-05-11 PROCEDURE — 3066F PR DOCUMENTATION OF TREATMENT FOR NEPHROPATHY: ICD-10-PCS | Mod: CPTII,S$GLB,, | Performed by: OPHTHALMOLOGY

## 2023-05-14 NOTE — PROGRESS NOTES
"Subjective:       Patient ID: Isabela Read is a 28 y.o. female      Chief Complaint   Patient presents with    Post-op Evaluation     History of Present Illness  HPI    DLS 03/30/2023 by Dr. AUTUMN Montaño MD    CC: pt state vision is still blurry OD but "coming around." Good Va OS    -eye pain  -headaches  -diplopia  -flashes/floaters  -shadows/veils/curtain    Eye Meds: PF OD QID    POHx:   1. Type 1 DM OD w/ PDR and Combined TRD w/ RRD    2. Proliferative Vitreoretinopathy OD   S/P Retinal  Detachment w/ SO       Last edited by Maximilian Montaño MD on 5/14/2023  5:09 PM.          Assessment/Plan:     1. Type 1 diabetes mellitus with right eye affected by proliferative retinopathy and combined traction and rhegmatogenous retinal detachment  2. Proliferative vitreoretinopathy of right eye  Doing well POW#7  Retina attached   SO behind pupil  IOP WNL  PI patent     Change gtts:    PF 2/0 x 2 wks then 1/0     Do not lie flat on back.    RD/Endophthalmitis precautions     Follow up in about 1 month (around 6/11/2023), or if symptoms worsen or fail to improve, for Comprehensive Examination, OCT Mac.   "

## 2023-05-15 ENCOUNTER — OFFICE VISIT (OUTPATIENT)
Dept: TRANSPLANT | Facility: CLINIC | Age: 28
End: 2023-05-15
Payer: MEDICARE

## 2023-05-15 ENCOUNTER — LAB VISIT (OUTPATIENT)
Dept: LAB | Facility: HOSPITAL | Age: 28
End: 2023-05-15
Attending: INTERNAL MEDICINE
Payer: MEDICARE

## 2023-05-15 VITALS
RESPIRATION RATE: 18 BRPM | DIASTOLIC BLOOD PRESSURE: 74 MMHG | WEIGHT: 153.88 LBS | OXYGEN SATURATION: 95 % | BODY MASS INDEX: 26.27 KG/M2 | SYSTOLIC BLOOD PRESSURE: 110 MMHG | HEIGHT: 64 IN | TEMPERATURE: 97 F | HEART RATE: 108 BPM

## 2023-05-15 DIAGNOSIS — Z94.0 STATUS POST SIMULTANEOUS KIDNEY AND PANCREAS TRANSPLANT: ICD-10-CM

## 2023-05-15 DIAGNOSIS — Z94.0 KIDNEY REPLACED BY TRANSPLANT: ICD-10-CM

## 2023-05-15 DIAGNOSIS — E87.20 METABOLIC ACIDOSIS: ICD-10-CM

## 2023-05-15 DIAGNOSIS — E10.29 TYPE 1 DIABETES MELLITUS WITH OTHER DIABETIC KIDNEY COMPLICATION: ICD-10-CM

## 2023-05-15 DIAGNOSIS — D84.9 IMMUNOSUPPRESSION: Primary | ICD-10-CM

## 2023-05-15 DIAGNOSIS — Z94.83 STATUS POST SIMULTANEOUS KIDNEY AND PANCREAS TRANSPLANT: ICD-10-CM

## 2023-05-15 DIAGNOSIS — Z94.83 STATUS POST PANCREAS TRANSPLANTATION: ICD-10-CM

## 2023-05-15 LAB
ALBUMIN SERPL BCP-MCNC: 4.1 G/DL (ref 3.5–5.2)
ALBUMIN SERPL BCP-MCNC: 4.1 G/DL (ref 3.5–5.2)
ALP SERPL-CCNC: 82 U/L (ref 55–135)
ALT SERPL W/O P-5'-P-CCNC: 65 U/L (ref 10–44)
AMYLASE SERPL-CCNC: 105 U/L (ref 20–110)
ANION GAP SERPL CALC-SCNC: 7 MMOL/L (ref 8–16)
AST SERPL-CCNC: 29 U/L (ref 10–40)
BASOPHILS # BLD AUTO: 0.02 K/UL (ref 0–0.2)
BASOPHILS NFR BLD: 0.5 % (ref 0–1.9)
BILIRUB DIRECT SERPL-MCNC: 0.3 MG/DL (ref 0.1–0.3)
BILIRUB SERPL-MCNC: 0.7 MG/DL (ref 0.1–1)
BUN SERPL-MCNC: 27 MG/DL (ref 6–20)
C PEPTIDE SERPL-MCNC: 3.45 NG/ML (ref 0.78–5.19)
CALCIUM SERPL-MCNC: 10.3 MG/DL (ref 8.7–10.5)
CHLORIDE SERPL-SCNC: 113 MMOL/L (ref 95–110)
CO2 SERPL-SCNC: 21 MMOL/L (ref 23–29)
CREAT SERPL-MCNC: 0.8 MG/DL (ref 0.5–1.4)
DIFFERENTIAL METHOD: ABNORMAL
EOSINOPHIL # BLD AUTO: 0.1 K/UL (ref 0–0.5)
EOSINOPHIL NFR BLD: 2.2 % (ref 0–8)
ERYTHROCYTE [DISTWIDTH] IN BLOOD BY AUTOMATED COUNT: 12.8 % (ref 11.5–14.5)
EST. GFR  (NO RACE VARIABLE): >60 ML/MIN/1.73 M^2
ESTIMATED AVG GLUCOSE: 111 MG/DL (ref 68–131)
GLUCOSE SERPL-MCNC: 82 MG/DL (ref 70–110)
HBA1C MFR BLD: 5.5 % (ref 4–5.6)
HCT VFR BLD AUTO: 47.3 % (ref 37–48.5)
HGB BLD-MCNC: 14.5 G/DL (ref 12–16)
IMM GRANULOCYTES # BLD AUTO: 0.01 K/UL (ref 0–0.04)
IMM GRANULOCYTES NFR BLD AUTO: 0.2 % (ref 0–0.5)
LIPASE SERPL-CCNC: 27 U/L (ref 4–60)
LYMPHOCYTES # BLD AUTO: 1 K/UL (ref 1–4.8)
LYMPHOCYTES NFR BLD: 25.7 % (ref 18–48)
MAGNESIUM SERPL-MCNC: 1.7 MG/DL (ref 1.6–2.6)
MCH RBC QN AUTO: 30 PG (ref 27–31)
MCHC RBC AUTO-ENTMCNC: 30.7 G/DL (ref 32–36)
MCV RBC AUTO: 98 FL (ref 82–98)
MONOCYTES # BLD AUTO: 0.4 K/UL (ref 0.3–1)
MONOCYTES NFR BLD: 10.9 % (ref 4–15)
NEUTROPHILS # BLD AUTO: 2.5 K/UL (ref 1.8–7.7)
NEUTROPHILS NFR BLD: 60.5 % (ref 38–73)
NRBC BLD-RTO: 0 /100 WBC
PHOSPHATE SERPL-MCNC: 3.2 MG/DL (ref 2.7–4.5)
PLATELET # BLD AUTO: 285 K/UL (ref 150–450)
PMV BLD AUTO: 10.3 FL (ref 9.2–12.9)
POTASSIUM SERPL-SCNC: 4.2 MMOL/L (ref 3.5–5.1)
PROT SERPL-MCNC: 7.9 G/DL (ref 6–8.4)
RBC # BLD AUTO: 4.84 M/UL (ref 4–5.4)
SODIUM SERPL-SCNC: 141 MMOL/L (ref 136–145)
TACROLIMUS BLD-MCNC: 7.7 NG/ML (ref 5–15)
WBC # BLD AUTO: 4.05 K/UL (ref 3.9–12.7)

## 2023-05-15 PROCEDURE — 82955 ASSAY OF G6PD ENZYME: CPT | Performed by: INTERNAL MEDICINE

## 2023-05-15 PROCEDURE — 3008F PR BODY MASS INDEX (BMI) DOCUMENTED: ICD-10-PCS | Mod: CPTII,,, | Performed by: INTERNAL MEDICINE

## 2023-05-15 PROCEDURE — 3044F PR MOST RECENT HEMOGLOBIN A1C LEVEL <7.0%: ICD-10-PCS | Mod: CPTII,,, | Performed by: INTERNAL MEDICINE

## 2023-05-15 PROCEDURE — 99999 PR PBB SHADOW E&M-EST. PATIENT-LVL III: ICD-10-PCS | Mod: PBBFAC,,, | Performed by: INTERNAL MEDICINE

## 2023-05-15 PROCEDURE — 3074F SYST BP LT 130 MM HG: CPT | Mod: CPTII,,, | Performed by: INTERNAL MEDICINE

## 2023-05-15 PROCEDURE — 80197 ASSAY OF TACROLIMUS: CPT | Performed by: INTERNAL MEDICINE

## 2023-05-15 PROCEDURE — 3066F PR DOCUMENTATION OF TREATMENT FOR NEPHROPATHY: ICD-10-PCS | Mod: CPTII,,, | Performed by: INTERNAL MEDICINE

## 2023-05-15 PROCEDURE — 83735 ASSAY OF MAGNESIUM: CPT | Performed by: INTERNAL MEDICINE

## 2023-05-15 PROCEDURE — 3078F PR MOST RECENT DIASTOLIC BLOOD PRESSURE < 80 MM HG: ICD-10-PCS | Mod: CPTII,,, | Performed by: INTERNAL MEDICINE

## 2023-05-15 PROCEDURE — 85025 COMPLETE CBC W/AUTO DIFF WBC: CPT | Performed by: INTERNAL MEDICINE

## 2023-05-15 PROCEDURE — 99214 PR OFFICE/OUTPT VISIT, EST, LEVL IV, 30-39 MIN: ICD-10-PCS | Mod: ,,, | Performed by: INTERNAL MEDICINE

## 2023-05-15 PROCEDURE — 83690 ASSAY OF LIPASE: CPT | Performed by: INTERNAL MEDICINE

## 2023-05-15 PROCEDURE — 87799 DETECT AGENT NOS DNA QUANT: CPT | Performed by: INTERNAL MEDICINE

## 2023-05-15 PROCEDURE — 3008F BODY MASS INDEX DOCD: CPT | Mod: CPTII,,, | Performed by: INTERNAL MEDICINE

## 2023-05-15 PROCEDURE — 84075 ASSAY ALKALINE PHOSPHATASE: CPT | Performed by: INTERNAL MEDICINE

## 2023-05-15 PROCEDURE — 3066F NEPHROPATHY DOC TX: CPT | Mod: CPTII,,, | Performed by: INTERNAL MEDICINE

## 2023-05-15 PROCEDURE — 99999 PR PBB SHADOW E&M-EST. PATIENT-LVL III: CPT | Mod: PBBFAC,,, | Performed by: INTERNAL MEDICINE

## 2023-05-15 PROCEDURE — 3044F HG A1C LEVEL LT 7.0%: CPT | Mod: CPTII,,, | Performed by: INTERNAL MEDICINE

## 2023-05-15 PROCEDURE — 80069 RENAL FUNCTION PANEL: CPT | Performed by: INTERNAL MEDICINE

## 2023-05-15 PROCEDURE — 83036 HEMOGLOBIN GLYCOSYLATED A1C: CPT | Performed by: INTERNAL MEDICINE

## 2023-05-15 PROCEDURE — 3074F PR MOST RECENT SYSTOLIC BLOOD PRESSURE < 130 MM HG: ICD-10-PCS | Mod: CPTII,,, | Performed by: INTERNAL MEDICINE

## 2023-05-15 PROCEDURE — 82150 ASSAY OF AMYLASE: CPT | Performed by: INTERNAL MEDICINE

## 2023-05-15 PROCEDURE — 84681 ASSAY OF C-PEPTIDE: CPT | Performed by: INTERNAL MEDICINE

## 2023-05-15 PROCEDURE — 3078F DIAST BP <80 MM HG: CPT | Mod: CPTII,,, | Performed by: INTERNAL MEDICINE

## 2023-05-15 PROCEDURE — 36415 COLL VENOUS BLD VENIPUNCTURE: CPT | Performed by: INTERNAL MEDICINE

## 2023-05-15 PROCEDURE — 99213 OFFICE O/P EST LOW 20 MIN: CPT | Performed by: INTERNAL MEDICINE

## 2023-05-15 PROCEDURE — 99214 OFFICE O/P EST MOD 30 MIN: CPT | Mod: ,,, | Performed by: INTERNAL MEDICINE

## 2023-05-15 NOTE — LETTER
May 18, 2023        Tai Camilo  [address]  [facility] [address]  Phone: [phone]  Fax: [phone]             [facility] 1st Fl  [address]  [facility] [address]  Phone: [phone]   Patient: Isabela Read   MR Number: [id number]   YOB: 1995   Date of Visit: 5/15/2023       Dear Dr. Tai Camilo    Thank you for referring Isabela Read to me for evaluation. Attached you will find relevant portions of my assessment and plan of care.    If you have questions, please do not hesitate to call me. I look forward to following Isabela Read along with you.    Sincerely,    Tammie Broussard, DO    Enclosure    If you would like to receive this communication electronically, please contact [email or web address] or [phone] to request [employer] access.    [employer] is a tool which provides read-only access to select patient information with whom you have a relationship. Its easy to use and provides real time access to review your patients record including encounter summaries, notes, results, and demographic information.    If you feel you have received this communication in error or would no longer like to receive these types of communications, please e-mail [email or web address]

## 2023-05-16 LAB — G6PD RBC-CCNT: 8 U/G HB (ref 8–11.9)

## 2023-05-18 ENCOUNTER — OFFICE VISIT (OUTPATIENT)
Dept: DERMATOLOGY | Facility: CLINIC | Age: 28
End: 2023-05-18
Payer: MEDICARE

## 2023-05-18 DIAGNOSIS — L21.9 SEBORRHEIC DERMATITIS: ICD-10-CM

## 2023-05-18 DIAGNOSIS — L85.8 KP (KERATOSIS PILARIS): ICD-10-CM

## 2023-05-18 DIAGNOSIS — L70.9 ACNE, UNSPECIFIED ACNE TYPE: Primary | ICD-10-CM

## 2023-05-18 DIAGNOSIS — Z76.89 ENCOUNTER FOR SKIN CARE: ICD-10-CM

## 2023-05-18 PROCEDURE — 1160F PR REVIEW ALL MEDS BY PRESCRIBER/CLIN PHARMACIST DOCUMENTED: ICD-10-PCS | Mod: CPTII,,, | Performed by: DERMATOLOGY

## 2023-05-18 PROCEDURE — 99214 PR OFFICE/OUTPT VISIT, EST, LEVL IV, 30-39 MIN: ICD-10-PCS | Mod: ,,, | Performed by: DERMATOLOGY

## 2023-05-18 PROCEDURE — 3044F HG A1C LEVEL LT 7.0%: CPT | Mod: CPTII,,, | Performed by: DERMATOLOGY

## 2023-05-18 PROCEDURE — 99999 PR PBB SHADOW E&M-EST. PATIENT-LVL II: CPT | Mod: PBBFAC,,, | Performed by: DERMATOLOGY

## 2023-05-18 PROCEDURE — 3066F NEPHROPATHY DOC TX: CPT | Mod: CPTII,,, | Performed by: DERMATOLOGY

## 2023-05-18 PROCEDURE — 99214 OFFICE O/P EST MOD 30 MIN: CPT | Mod: ,,, | Performed by: DERMATOLOGY

## 2023-05-18 PROCEDURE — 3044F PR MOST RECENT HEMOGLOBIN A1C LEVEL <7.0%: ICD-10-PCS | Mod: CPTII,,, | Performed by: DERMATOLOGY

## 2023-05-18 PROCEDURE — 99999 PR PBB SHADOW E&M-EST. PATIENT-LVL II: ICD-10-PCS | Mod: PBBFAC,,, | Performed by: DERMATOLOGY

## 2023-05-18 PROCEDURE — 3066F PR DOCUMENTATION OF TREATMENT FOR NEPHROPATHY: ICD-10-PCS | Mod: CPTII,,, | Performed by: DERMATOLOGY

## 2023-05-18 PROCEDURE — 1160F RVW MEDS BY RX/DR IN RCRD: CPT | Mod: CPTII,,, | Performed by: DERMATOLOGY

## 2023-05-18 PROCEDURE — 1159F PR MEDICATION LIST DOCUMENTED IN MEDICAL RECORD: ICD-10-PCS | Mod: CPTII,,, | Performed by: DERMATOLOGY

## 2023-05-18 PROCEDURE — 1159F MED LIST DOCD IN RCRD: CPT | Mod: CPTII,,, | Performed by: DERMATOLOGY

## 2023-05-18 RX ORDER — TRETINOIN 0.5 MG/G
CREAM TOPICAL NIGHTLY
Qty: 20 G | Refills: 1 | Status: SHIPPED | OUTPATIENT
Start: 2023-05-18

## 2023-05-18 NOTE — PROGRESS NOTES
Subjective:      Patient ID:  Isabela Read is a 28 y.o. female who presents for   Chief Complaint   Patient presents with    Rash     F/u     Rash - Follow-up  Symptom Course: Same.  Currently using: OTC salicylic acid wash and ketoconazole (NIZORAL) 2 % cream.  Affected locations: face and chest  Affected locations: face and chest  Duration: 5 months  Signs / symptoms: dryness, burning, itching, spreading and inflamed  Signs / symptoms: dryness, burning and itching  Aggravated by: nothing  Relieving factors/Treatments tried: OTC moisturizer and OTC hydrocortisone  Improvement on treatment: mild      Review of Systems   Constitutional:  Negative for fever.   HENT:  Negative for sore throat.    Respiratory:  Negative for cough.    Skin:  Positive for itching, rash and dry skin.     Objective:   Physical Exam   Constitutional: She appears well-developed and well-nourished. No distress.   Eyes: No conjunctival no injection.   Neurological: She is alert and oriented to person, place, and time. She is not disoriented.   Psychiatric: She has a normal mood and affect.   Skin:   Areas Examined (abnormalities noted in diagram):   Head / Face Inspection Performed          Diagram Legend     Erythematous scaling macule/papule c/w actinic keratosis       Vascular papule c/w angioma      Pigmented verrucoid papule/plaque c/w seborrheic keratosis      Yellow umbilicated papule c/w sebaceous hyperplasia      Irregularly shaped tan macule c/w lentigo     1-2 mm smooth white papules consistent with Milia      Movable subcutaneous cyst with punctum c/w epidermal inclusion cyst      Subcutaneous movable cyst c/w pilar cyst      Firm pink to brown papule c/w dermatofibroma      Pedunculated fleshy papule(s) c/w skin tag(s)      Evenly pigmented macule c/w junctional nevus     Mildly variegated pigmented, slightly irregular-bordered macule c/w mildly atypical nevus      Flesh colored to evenly pigmented papule c/w  intradermal nevus       Pink pearly papule/plaque c/w basal cell carcinoma      Erythematous hyperkeratotic cursted plaque c/w SCC      Surgical scar with no sign of skin cancer recurrence      Open and closed comedones      Inflammatory papules and pustules      Verrucoid papule consistent consistent with wart     Erythematous eczematous patches and plaques     Dystrophic onycholytic nail with subungual debris c/w onychomycosis     Umbilicated papule    Erythematous-base heme-crusted tan verrucoid plaque consistent with inflamed seborrheic keratosis     Erythematous Silvery Scaling Plaque c/w Psoriasis     See annotation      Assessment / Plan:        Acne, unspecified acne type  -     tretinoin (RETIN-A) 0.05 % cream; Apply topically every evening. Start with every other night and move up to nightly after 2 weeks if not too dry.  Dispense: 20 g; Refill: 1  Reviewed with patient different treatment options and associated risks.  Okay to start RA now.  Proper application of medications and or care for affected area(s) and condition(s) reviewed.  Okay to cont pt's cetaphil products.    Seborrheic dermatitis  Condition is stable.  We will continue present management.  Patient to watch for recurrence or flares or worsening and to call the clinic for a follow up appointment for such.    KP (keratosis pilaris)  -     tretinoin (RETIN-A) 0.05 % cream; Apply topically every evening. Start with every other night and move up to nightly after 2 weeks if not too dry.  Dispense: 20 g; Refill: 1  Reviewed with patient different treatment options and associated risks.  Cont pt's cetaphil sal acid.    Encounter for skin care  No hot water bathing reviewed.  Stop all applicable skin care and hair products, chemicals, perfumes, and treatments to affected areas.  No deodorants and antiperspirants, if applicable.  No store bought lotions or potions.                Follow up in about 3 months (around 8/18/2023).

## 2023-05-18 NOTE — PROGRESS NOTES
Kidney/Pancreas Post-Transplant Assessment    Referring Physician: Oswald Kruger  Current Nephrologist: Tai Camilo    ORGAN: PANCREAS  Donor Type: donation after brain death  PHS Increased Risk: no  Cold Ischemia: 310 mins  Induction Medications: Thymo    Subjective:     CC:  Reassessment of renal allograft function and management of chronic immunosuppression.    HPI:  Ms. Read is a 28 y.o. year old Black or  female who received a donation after brain death kidney and pancreas transplant on 11/3/22. Since the last visit, patient underwent R eye retinal surgery on 3/2023.    Seen in clinic alone and denies any issues. Patient denies any fever, HA, runny nose, watery eyes. Denies any cough or SOB. Denies any CP, nausea, vomiting or abdominal pain. Patient denies any diarrhea, dysuria or frequency. Eating well but states that she has been stable on her weight recently    SBP: does not check but has not needed any midodrine for the past 2 months.     DONOR INFORMATION:   18 yr WM without any HTN, DM or CA.  of anoxia. DBD. CIT of 5 hr and 29 min. KDPI of 1 %    Review of Systems   Constitutional:  Negative for activity change, appetite change, chills and fever.   HENT:  Negative for congestion, sneezing and sore throat.    Eyes:  Negative for pain and itching.   Respiratory:  Negative for apnea, cough, shortness of breath and wheezing.    Cardiovascular:  Negative for chest pain.   Gastrointestinal:  Negative for abdominal pain, constipation, diarrhea, nausea and vomiting.   Genitourinary:  Negative for difficulty urinating, dysuria and frequency.   Musculoskeletal:  Negative for back pain, joint swelling and neck pain.   Skin:  Negative for color change.   Neurological:  Negative for dizziness, light-headedness and headaches.   Psychiatric/Behavioral:  Negative for behavioral problems and confusion. The patient is not nervous/anxious.      Objective:   Blood pressure 110/74, pulse 108,  "temperature 97.3 °F (36.3 °C), temperature source Tympanic, resp. rate 18, height 5' 4" (1.626 m), weight 69.8 kg (153 lb 14.1 oz), SpO2 95 %.body mass index is 26.41 kg/m².    Physical Exam  Vitals reviewed.   Constitutional:       General: She is not in acute distress.     Appearance: Normal appearance. She is not ill-appearing or toxic-appearing.      Comments: Appears stated age   HENT:      Head: Normocephalic and atraumatic.      Right Ear: External ear normal.      Left Ear: External ear normal.      Nose: Nose normal.   Eyes:      General: No scleral icterus.     Conjunctiva/sclera: Conjunctivae normal.   Cardiovascular:      Rate and Rhythm: Normal rate and regular rhythm.      Pulses: Normal pulses.      Heart sounds: Normal heart sounds. No murmur heard.    No friction rub.   Pulmonary:      Effort: Pulmonary effort is normal. No respiratory distress.      Breath sounds: Normal breath sounds. No wheezing or rhonchi.   Abdominal:      General: Bowel sounds are normal. There is no distension.      Palpations: Abdomen is soft.      Tenderness: There is no abdominal tenderness. There is no guarding.   Musculoskeletal:         General: No swelling or deformity. Normal range of motion.      Cervical back: Normal range of motion and neck supple. No tenderness.      Right lower leg: No edema.      Left lower leg: No edema.   Skin:     General: Skin is warm and dry.      Coloration: Skin is not jaundiced.      Findings: No erythema or rash.   Neurological:      General: No focal deficit present.      Mental Status: She is alert and oriented to person, place, and time.      Motor: No weakness.   Psychiatric:         Mood and Affect: Mood normal.         Behavior: Behavior normal.       Labs:  Lab Results   Component Value Date    WBC 4.05 05/15/2023    HGB 14.5 05/15/2023    HCT 47.3 05/15/2023     05/15/2023    K 4.2 05/15/2023     (H) 05/15/2023    CO2 21 (L) 05/15/2023    BUN 27 (H) 05/15/2023    " CREATININE 0.8 05/15/2023    EGFRNONAA 9.1 (A) 05/25/2022    CALCIUM 10.3 05/15/2023    PHOS 3.2 05/15/2023    MG 1.7 05/15/2023    ALBUMIN 4.1 05/15/2023    ALBUMIN 4.1 05/15/2023    AST 29 05/15/2023    ALT 65 (H) 05/15/2023    UTPCR 0.33 (H) 01/03/2023    .3 (H) 12/01/2022    TACROLIMUS 7.7 05/15/2023       No results found for: EXTANC, EXTWBC, EXTSEGS, EXTPLATELETS, EXTHEMOGLOBI, EXTHEMATOCRI, EXTCREATININ, EXTSODIUM, EXTPOTASSIUM, EXTBUN, EXTCO2, EXTCALCIUM, EXTPHOSPHORU, EXTGLUCOSE, EXTALBUMIN, EXTAST, EXTALT, EXTBILITOTAL, EXTLIPASE, EXTAMYLASE    No results found for: EXTCYCLOSLVL, EXTSIROLIMUS, EXTTACROLVL, EXTPROTCRE, EXTPTHINTACT, EXTPROTEINUA, EXTWBCUA, EXTRBCUA    Labs were reviewed with the patient    Assessment and Plan   89 yr old AAF with type I DM s/p a simultaneous kidney and pancreas transplant on 11/3/22. 0% PRA. Thymo induction. Post transplant course complicated by N/V/GEORGE with urinary retention s/p dc placement (s/p removal), recent CMV infection as well as early kidney biopsy concerning for ABMR with negative DSA- to be treated with IVIG                1) s/p simultaneous kidney and pancreas transplant:              - kidney and pancreas function at baseline   - acceptable FK level. Cont on FK 4 mg BID   - cont on  mg BID   - cont on prednisone 5 mg daily  - completed course of bactrim and valcyte               - cont on ASA 81 mg daily  2) hypotension:              - resolved   3) Recurrent UTI:   - will discuss with urology regarding cystoscopy as well as urodynamic studies  4) CMV infection:              - resolved.   5) Metabolic acidosis:              - cont on sodium bicarb 1300 TID  6) Hypophosphatemia:              - resolved   8) Hypomagnesmemia:              - monitor for now   9) hx of type I DM with retinopathy  10) Elevated LFT:              - followed by hepatology in the past with fibroscan performed concerning for fibrosis with MRI elastogram suspicious for  iron overload     Labs and RTC per protocol    Tammie Broussard DO  Transplant Nephrology

## 2023-05-19 ENCOUNTER — TELEPHONE (OUTPATIENT)
Dept: PHARMACY | Facility: CLINIC | Age: 28
End: 2023-05-19
Payer: MEDICARE

## 2023-05-27 ENCOUNTER — HOSPITAL ENCOUNTER (INPATIENT)
Facility: HOSPITAL | Age: 28
LOS: 2 days | Discharge: HOME | DRG: 872 | End: 2023-05-30
Attending: EMERGENCY MEDICINE | Admitting: INTERNAL MEDICINE
Payer: MEDICARE

## 2023-05-27 DIAGNOSIS — Z94.83 STATUS POST SIMULTANEOUS KIDNEY AND PANCREAS TRANSPLANT: ICD-10-CM

## 2023-05-27 DIAGNOSIS — R65.10 SIRS (SYSTEMIC INFLAMMATORY RESPONSE SYNDROME): ICD-10-CM

## 2023-05-27 DIAGNOSIS — N39.0 URINARY TRACT INFECTION WITHOUT HEMATURIA, SITE UNSPECIFIED: Primary | ICD-10-CM

## 2023-05-27 DIAGNOSIS — N30.01 ACUTE CYSTITIS WITH HEMATURIA: ICD-10-CM

## 2023-05-27 DIAGNOSIS — Z94.0 STATUS POST DECEASED-DONOR KIDNEY TRANSPLANTATION: ICD-10-CM

## 2023-05-27 DIAGNOSIS — E83.39 HYPOPHOSPHATEMIA: ICD-10-CM

## 2023-05-27 DIAGNOSIS — A41.9 SEPSIS SECONDARY TO UTI: ICD-10-CM

## 2023-05-27 DIAGNOSIS — Z29.89 PROPHYLACTIC IMMUNOTHERAPY: ICD-10-CM

## 2023-05-27 DIAGNOSIS — Z79.899 IMMUNOSUPPRESSIVE MANAGEMENT ENCOUNTER FOLLOWING KIDNEY TRANSPLANT: ICD-10-CM

## 2023-05-27 DIAGNOSIS — Z79.60 LONG-TERM USE OF IMMUNOSUPPRESSANT MEDICATION: ICD-10-CM

## 2023-05-27 DIAGNOSIS — Z94.0 IMMUNOSUPPRESSIVE MANAGEMENT ENCOUNTER FOLLOWING KIDNEY TRANSPLANT: ICD-10-CM

## 2023-05-27 DIAGNOSIS — Z94.0 STATUS POST SIMULTANEOUS KIDNEY AND PANCREAS TRANSPLANT: ICD-10-CM

## 2023-05-27 DIAGNOSIS — N39.0 SEPSIS SECONDARY TO UTI: ICD-10-CM

## 2023-05-27 DIAGNOSIS — R00.0 TACHYCARDIA: ICD-10-CM

## 2023-05-27 DIAGNOSIS — Z91.89 AT RISK FOR OPPORTUNISTIC INFECTIONS: ICD-10-CM

## 2023-05-27 DIAGNOSIS — N39.0 UTI (URINARY TRACT INFECTION): ICD-10-CM

## 2023-05-27 LAB
ALBUMIN SERPL BCP-MCNC: 4.5 G/DL (ref 3.5–5.2)
ALP SERPL-CCNC: 84 U/L (ref 55–135)
ALT SERPL W/O P-5'-P-CCNC: 47 U/L (ref 10–44)
AMYLASE SERPL-CCNC: 89 U/L (ref 20–110)
ANION GAP SERPL CALC-SCNC: 13 MMOL/L (ref 8–16)
AST SERPL-CCNC: 23 U/L (ref 10–40)
BILIRUB SERPL-MCNC: 0.7 MG/DL (ref 0.1–1)
BUN SERPL-MCNC: 26 MG/DL (ref 6–20)
CALCIUM SERPL-MCNC: 10.6 MG/DL (ref 8.7–10.5)
CHLORIDE SERPL-SCNC: 108 MMOL/L (ref 95–110)
CO2 SERPL-SCNC: 19 MMOL/L (ref 23–29)
CREAT SERPL-MCNC: 1.1 MG/DL (ref 0.5–1.4)
EST. GFR  (NO RACE VARIABLE): >60 ML/MIN/1.73 M^2
GLUCOSE SERPL-MCNC: 82 MG/DL (ref 70–110)
LIPASE SERPL-CCNC: 14 U/L (ref 4–60)
POTASSIUM SERPL-SCNC: 4.1 MMOL/L (ref 3.5–5.1)
PROT SERPL-MCNC: 8.8 G/DL (ref 6–8.4)
SODIUM SERPL-SCNC: 140 MMOL/L (ref 136–145)

## 2023-05-27 PROCEDURE — 87040 BLOOD CULTURE FOR BACTERIA: CPT | Mod: 59 | Performed by: STUDENT IN AN ORGANIZED HEALTH CARE EDUCATION/TRAINING PROGRAM

## 2023-05-27 PROCEDURE — 63600175 PHARM REV CODE 636 W HCPCS: Performed by: STUDENT IN AN ORGANIZED HEALTH CARE EDUCATION/TRAINING PROGRAM

## 2023-05-27 PROCEDURE — 93010 EKG 12-LEAD: ICD-10-PCS | Mod: ,,, | Performed by: INTERNAL MEDICINE

## 2023-05-27 PROCEDURE — 81025 URINE PREGNANCY TEST: CPT | Performed by: STUDENT IN AN ORGANIZED HEALTH CARE EDUCATION/TRAINING PROGRAM

## 2023-05-27 PROCEDURE — 85025 COMPLETE CBC W/AUTO DIFF WBC: CPT | Performed by: STUDENT IN AN ORGANIZED HEALTH CARE EDUCATION/TRAINING PROGRAM

## 2023-05-27 PROCEDURE — 99285 PR EMERGENCY DEPT VISIT,LEVEL V: ICD-10-PCS | Mod: GC,,, | Performed by: EMERGENCY MEDICINE

## 2023-05-27 PROCEDURE — 93005 ELECTROCARDIOGRAM TRACING: CPT

## 2023-05-27 PROCEDURE — 83690 ASSAY OF LIPASE: CPT | Performed by: STUDENT IN AN ORGANIZED HEALTH CARE EDUCATION/TRAINING PROGRAM

## 2023-05-27 PROCEDURE — 80053 COMPREHEN METABOLIC PANEL: CPT | Performed by: STUDENT IN AN ORGANIZED HEALTH CARE EDUCATION/TRAINING PROGRAM

## 2023-05-27 PROCEDURE — 25000003 PHARM REV CODE 250: Performed by: STUDENT IN AN ORGANIZED HEALTH CARE EDUCATION/TRAINING PROGRAM

## 2023-05-27 PROCEDURE — 82150 ASSAY OF AMYLASE: CPT | Performed by: STUDENT IN AN ORGANIZED HEALTH CARE EDUCATION/TRAINING PROGRAM

## 2023-05-27 PROCEDURE — 82803 BLOOD GASES ANY COMBINATION: CPT

## 2023-05-27 PROCEDURE — 93010 ELECTROCARDIOGRAM REPORT: CPT | Mod: ,,, | Performed by: INTERNAL MEDICINE

## 2023-05-27 PROCEDURE — 96365 THER/PROPH/DIAG IV INF INIT: CPT

## 2023-05-27 PROCEDURE — 96375 TX/PRO/DX INJ NEW DRUG ADDON: CPT

## 2023-05-27 PROCEDURE — 99285 EMERGENCY DEPT VISIT HI MDM: CPT | Mod: GC,,, | Performed by: EMERGENCY MEDICINE

## 2023-05-27 PROCEDURE — 99285 EMERGENCY DEPT VISIT HI MDM: CPT | Mod: 25

## 2023-05-27 PROCEDURE — 83605 ASSAY OF LACTIC ACID: CPT

## 2023-05-27 PROCEDURE — 99900035 HC TECH TIME PER 15 MIN (STAT)

## 2023-05-27 PROCEDURE — U0002 COVID-19 LAB TEST NON-CDC: HCPCS | Performed by: STUDENT IN AN ORGANIZED HEALTH CARE EDUCATION/TRAINING PROGRAM

## 2023-05-27 RX ORDER — ONDANSETRON 2 MG/ML
4 INJECTION INTRAMUSCULAR; INTRAVENOUS
Status: COMPLETED | OUTPATIENT
Start: 2023-05-27 | End: 2023-05-27

## 2023-05-27 RX ADMIN — CEFEPIME 2 G: 2 INJECTION, POWDER, FOR SOLUTION INTRAVENOUS at 11:05

## 2023-05-27 RX ADMIN — ONDANSETRON 4 MG: 2 INJECTION INTRAMUSCULAR; INTRAVENOUS at 11:05

## 2023-05-27 RX ADMIN — SODIUM CHLORIDE 500 ML: 9 INJECTION, SOLUTION INTRAVENOUS at 11:05

## 2023-05-28 PROBLEM — R65.10 SIRS (SYSTEMIC INFLAMMATORY RESPONSE SYNDROME): Status: ACTIVE | Noted: 2022-11-25

## 2023-05-28 PROBLEM — R31.9 URINARY TRACT INFECTION WITH HEMATURIA: Status: ACTIVE | Noted: 2023-05-28

## 2023-05-28 PROBLEM — N39.0 SEPSIS SECONDARY TO UTI: Status: ACTIVE | Noted: 2022-11-25

## 2023-05-28 PROBLEM — N39.0 URINARY TRACT INFECTION WITHOUT HEMATURIA: Status: ACTIVE | Noted: 2023-05-28

## 2023-05-28 LAB
ALBUMIN SERPL BCP-MCNC: 3.2 G/DL (ref 3.5–5.2)
ALLENS TEST: NORMAL
ALP SERPL-CCNC: 60 U/L (ref 55–135)
ALT SERPL W/O P-5'-P-CCNC: 30 U/L (ref 10–44)
ANION GAP SERPL CALC-SCNC: 9 MMOL/L (ref 8–16)
AST SERPL-CCNC: 14 U/L (ref 10–40)
B-HCG UR QL: NEGATIVE
BACTERIA #/AREA URNS AUTO: ABNORMAL /HPF
BASOPHILS # BLD AUTO: 0.01 K/UL (ref 0–0.2)
BASOPHILS # BLD AUTO: 0.02 K/UL (ref 0–0.2)
BASOPHILS NFR BLD: 0.1 % (ref 0–1.9)
BASOPHILS NFR BLD: 0.2 % (ref 0–1.9)
BILIRUB SERPL-MCNC: 0.8 MG/DL (ref 0.1–1)
BILIRUB UR QL STRIP: NEGATIVE
BNP SERPL-MCNC: <10 PG/ML (ref 0–99)
BUN SERPL-MCNC: 23 MG/DL (ref 6–20)
CALCIUM SERPL-MCNC: 8 MG/DL (ref 8.7–10.5)
CHLORIDE SERPL-SCNC: 116 MMOL/L (ref 95–110)
CLARITY UR REFRACT.AUTO: ABNORMAL
CO2 SERPL-SCNC: 17 MMOL/L (ref 23–29)
COLOR UR AUTO: YELLOW
CREAT SERPL-MCNC: 1 MG/DL (ref 0.5–1.4)
CTP QC/QA: YES
DIFFERENTIAL METHOD: ABNORMAL
DIFFERENTIAL METHOD: ABNORMAL
EOSINOPHIL # BLD AUTO: 0 K/UL (ref 0–0.5)
EOSINOPHIL # BLD AUTO: 0 K/UL (ref 0–0.5)
EOSINOPHIL NFR BLD: 0 % (ref 0–8)
EOSINOPHIL NFR BLD: 0.2 % (ref 0–8)
ERYTHROCYTE [DISTWIDTH] IN BLOOD BY AUTOMATED COUNT: 13 % (ref 11.5–14.5)
ERYTHROCYTE [DISTWIDTH] IN BLOOD BY AUTOMATED COUNT: 13 % (ref 11.5–14.5)
EST. GFR  (NO RACE VARIABLE): >60 ML/MIN/1.73 M^2
GLUCOSE SERPL-MCNC: 93 MG/DL (ref 70–110)
GLUCOSE UR QL STRIP: NEGATIVE
HCT VFR BLD AUTO: 39.4 % (ref 37–48.5)
HCT VFR BLD AUTO: 46.1 % (ref 37–48.5)
HGB BLD-MCNC: 12.4 G/DL (ref 12–16)
HGB BLD-MCNC: 14.3 G/DL (ref 12–16)
HGB UR QL STRIP: ABNORMAL
HYALINE CASTS UR QL AUTO: 0 /LPF
IMM GRANULOCYTES # BLD AUTO: 0.03 K/UL (ref 0–0.04)
IMM GRANULOCYTES # BLD AUTO: 0.05 K/UL (ref 0–0.04)
IMM GRANULOCYTES NFR BLD AUTO: 0.3 % (ref 0–0.5)
IMM GRANULOCYTES NFR BLD AUTO: 0.4 % (ref 0–0.5)
KETONES UR QL STRIP: ABNORMAL
LACTATE SERPL-SCNC: 0.9 MMOL/L (ref 0.5–2.2)
LDH SERPL L TO P-CCNC: 1.01 MMOL/L (ref 0.5–2.2)
LEUKOCYTE ESTERASE UR QL STRIP: ABNORMAL
LIPASE SERPL-CCNC: 11 U/L (ref 4–60)
LYMPHOCYTES # BLD AUTO: 0.4 K/UL (ref 1–4.8)
LYMPHOCYTES # BLD AUTO: 0.6 K/UL (ref 1–4.8)
LYMPHOCYTES NFR BLD: 3.3 % (ref 18–48)
LYMPHOCYTES NFR BLD: 5.2 % (ref 18–48)
MAGNESIUM SERPL-MCNC: 1.1 MG/DL (ref 1.6–2.6)
MCH RBC QN AUTO: 29.8 PG (ref 27–31)
MCH RBC QN AUTO: 30 PG (ref 27–31)
MCHC RBC AUTO-ENTMCNC: 31 G/DL (ref 32–36)
MCHC RBC AUTO-ENTMCNC: 31.5 G/DL (ref 32–36)
MCV RBC AUTO: 95 FL (ref 82–98)
MCV RBC AUTO: 96 FL (ref 82–98)
MICROSCOPIC COMMENT: ABNORMAL
MONOCYTES # BLD AUTO: 0.3 K/UL (ref 0.3–1)
MONOCYTES # BLD AUTO: 1 K/UL (ref 0.3–1)
MONOCYTES NFR BLD: 2.3 % (ref 4–15)
MONOCYTES NFR BLD: 7.7 % (ref 4–15)
NEUTROPHILS # BLD AUTO: 10.5 K/UL (ref 1.8–7.7)
NEUTROPHILS # BLD AUTO: 10.7 K/UL (ref 1.8–7.7)
NEUTROPHILS NFR BLD: 86.3 % (ref 38–73)
NEUTROPHILS NFR BLD: 94 % (ref 38–73)
NITRITE UR QL STRIP: POSITIVE
NRBC BLD-RTO: 0 /100 WBC
NRBC BLD-RTO: 0 /100 WBC
PH UR STRIP: 6 [PH] (ref 5–8)
PLATELET # BLD AUTO: 217 K/UL (ref 150–450)
PLATELET # BLD AUTO: 243 K/UL (ref 150–450)
PMV BLD AUTO: 10.4 FL (ref 9.2–12.9)
PMV BLD AUTO: 11.3 FL (ref 9.2–12.9)
POTASSIUM SERPL-SCNC: 3 MMOL/L (ref 3.5–5.1)
PROT SERPL-MCNC: 5.9 G/DL (ref 6–8.4)
PROT UR QL STRIP: ABNORMAL
RBC # BLD AUTO: 4.13 M/UL (ref 4–5.4)
RBC # BLD AUTO: 4.8 M/UL (ref 4–5.4)
RBC #/AREA URNS AUTO: 16 /HPF (ref 0–4)
SAMPLE: NORMAL
SARS-COV-2 RDRP RESP QL NAA+PROBE: NEGATIVE
SITE: NORMAL
SODIUM SERPL-SCNC: 142 MMOL/L (ref 136–145)
SP GR UR STRIP: 1.02 (ref 1–1.03)
SQUAMOUS #/AREA URNS AUTO: 19 /HPF
TACROLIMUS BLD-MCNC: 8 NG/ML (ref 5–15)
TROPONIN I SERPL DL<=0.01 NG/ML-MCNC: <0.006 NG/ML (ref 0–0.03)
URN SPEC COLLECT METH UR: ABNORMAL
WBC # BLD AUTO: 11.13 K/UL (ref 3.9–12.7)
WBC # BLD AUTO: 12.39 K/UL (ref 3.9–12.7)
WBC #/AREA URNS AUTO: >100 /HPF (ref 0–5)
WBC CLUMPS UR QL AUTO: ABNORMAL

## 2023-05-28 PROCEDURE — 25000003 PHARM REV CODE 250: Performed by: NURSE PRACTITIONER

## 2023-05-28 PROCEDURE — 80053 COMPREHEN METABOLIC PANEL: CPT | Performed by: NURSE PRACTITIONER

## 2023-05-28 PROCEDURE — 83735 ASSAY OF MAGNESIUM: CPT | Performed by: NURSE PRACTITIONER

## 2023-05-28 PROCEDURE — 83880 ASSAY OF NATRIURETIC PEPTIDE: CPT | Performed by: STUDENT IN AN ORGANIZED HEALTH CARE EDUCATION/TRAINING PROGRAM

## 2023-05-28 PROCEDURE — 81001 URINALYSIS AUTO W/SCOPE: CPT | Performed by: STUDENT IN AN ORGANIZED HEALTH CARE EDUCATION/TRAINING PROGRAM

## 2023-05-28 PROCEDURE — 96361 HYDRATE IV INFUSION ADD-ON: CPT

## 2023-05-28 PROCEDURE — 85025 COMPLETE CBC W/AUTO DIFF WBC: CPT | Performed by: NURSE PRACTITIONER

## 2023-05-28 PROCEDURE — 20600001 HC STEP DOWN PRIVATE ROOM

## 2023-05-28 PROCEDURE — 99223 1ST HOSP IP/OBS HIGH 75: CPT | Mod: ,,, | Performed by: NURSE PRACTITIONER

## 2023-05-28 PROCEDURE — 87077 CULTURE AEROBIC IDENTIFY: CPT | Mod: 59 | Performed by: STUDENT IN AN ORGANIZED HEALTH CARE EDUCATION/TRAINING PROGRAM

## 2023-05-28 PROCEDURE — 87186 SC STD MICRODIL/AGAR DIL: CPT | Performed by: STUDENT IN AN ORGANIZED HEALTH CARE EDUCATION/TRAINING PROGRAM

## 2023-05-28 PROCEDURE — 87088 URINE BACTERIA CULTURE: CPT | Performed by: STUDENT IN AN ORGANIZED HEALTH CARE EDUCATION/TRAINING PROGRAM

## 2023-05-28 PROCEDURE — 83605 ASSAY OF LACTIC ACID: CPT | Performed by: NURSE PRACTITIONER

## 2023-05-28 PROCEDURE — 83690 ASSAY OF LIPASE: CPT | Performed by: NURSE PRACTITIONER

## 2023-05-28 PROCEDURE — 99223 PR INITIAL HOSPITAL CARE,LEVL III: ICD-10-PCS | Mod: ,,, | Performed by: NURSE PRACTITIONER

## 2023-05-28 PROCEDURE — 25000003 PHARM REV CODE 250: Performed by: EMERGENCY MEDICINE

## 2023-05-28 PROCEDURE — 63600175 PHARM REV CODE 636 W HCPCS: Performed by: STUDENT IN AN ORGANIZED HEALTH CARE EDUCATION/TRAINING PROGRAM

## 2023-05-28 PROCEDURE — 63600175 PHARM REV CODE 636 W HCPCS: Performed by: NURSE PRACTITIONER

## 2023-05-28 PROCEDURE — 87086 URINE CULTURE/COLONY COUNT: CPT | Performed by: STUDENT IN AN ORGANIZED HEALTH CARE EDUCATION/TRAINING PROGRAM

## 2023-05-28 PROCEDURE — 93010 ELECTROCARDIOGRAM REPORT: CPT | Mod: ,,, | Performed by: INTERNAL MEDICINE

## 2023-05-28 PROCEDURE — 96375 TX/PRO/DX INJ NEW DRUG ADDON: CPT

## 2023-05-28 PROCEDURE — 25500020 PHARM REV CODE 255: Performed by: EMERGENCY MEDICINE

## 2023-05-28 PROCEDURE — 94761 N-INVAS EAR/PLS OXIMETRY MLT: CPT

## 2023-05-28 PROCEDURE — 80197 ASSAY OF TACROLIMUS: CPT | Performed by: NURSE PRACTITIONER

## 2023-05-28 PROCEDURE — 93010 EKG 12-LEAD: ICD-10-PCS | Mod: ,,, | Performed by: INTERNAL MEDICINE

## 2023-05-28 PROCEDURE — 93005 ELECTROCARDIOGRAM TRACING: CPT

## 2023-05-28 PROCEDURE — 84484 ASSAY OF TROPONIN QUANT: CPT | Performed by: STUDENT IN AN ORGANIZED HEALTH CARE EDUCATION/TRAINING PROGRAM

## 2023-05-28 RX ORDER — MORPHINE SULFATE 2 MG/ML
6 INJECTION, SOLUTION INTRAMUSCULAR; INTRAVENOUS
Status: COMPLETED | OUTPATIENT
Start: 2023-05-28 | End: 2023-05-28

## 2023-05-28 RX ORDER — ACETAMINOPHEN 325 MG/1
650 TABLET ORAL EVERY 8 HOURS PRN
Status: DISCONTINUED | OUTPATIENT
Start: 2023-05-28 | End: 2023-05-30 | Stop reason: HOSPADM

## 2023-05-28 RX ORDER — MAGNESIUM SULFATE HEPTAHYDRATE 40 MG/ML
2 INJECTION, SOLUTION INTRAVENOUS
Status: COMPLETED | OUTPATIENT
Start: 2023-05-28 | End: 2023-05-28

## 2023-05-28 RX ORDER — MYCOPHENOLATE MOFETIL 250 MG/1
500 CAPSULE ORAL 2 TIMES DAILY
Status: DISCONTINUED | OUTPATIENT
Start: 2023-05-28 | End: 2023-05-30 | Stop reason: HOSPADM

## 2023-05-28 RX ORDER — ASPIRIN 81 MG/1
81 TABLET ORAL DAILY
Status: DISCONTINUED | OUTPATIENT
Start: 2023-05-28 | End: 2023-05-30 | Stop reason: HOSPADM

## 2023-05-28 RX ORDER — POTASSIUM CHLORIDE 750 MG/1
40 CAPSULE, EXTENDED RELEASE ORAL 2 TIMES DAILY
Status: COMPLETED | OUTPATIENT
Start: 2023-05-28 | End: 2023-05-28

## 2023-05-28 RX ORDER — ATORVASTATIN CALCIUM 10 MG/1
10 TABLET, FILM COATED ORAL NIGHTLY
Status: DISCONTINUED | OUTPATIENT
Start: 2023-05-28 | End: 2023-05-30 | Stop reason: HOSPADM

## 2023-05-28 RX ORDER — SODIUM CHLORIDE 9 MG/ML
INJECTION, SOLUTION INTRAVENOUS CONTINUOUS
Status: ACTIVE | OUTPATIENT
Start: 2023-05-28 | End: 2023-05-29

## 2023-05-28 RX ORDER — PROMETHAZINE HYDROCHLORIDE 12.5 MG/1
12.5 TABLET ORAL EVERY 6 HOURS PRN
Status: DISCONTINUED | OUTPATIENT
Start: 2023-05-28 | End: 2023-05-28

## 2023-05-28 RX ORDER — ENOXAPARIN SODIUM 100 MG/ML
40 INJECTION SUBCUTANEOUS EVERY 24 HOURS
Status: DISCONTINUED | OUTPATIENT
Start: 2023-05-28 | End: 2023-05-30 | Stop reason: HOSPADM

## 2023-05-28 RX ORDER — ERGOCALCIFEROL 1.25 MG/1
50000 CAPSULE ORAL
Status: DISCONTINUED | OUTPATIENT
Start: 2023-05-28 | End: 2023-05-30 | Stop reason: HOSPADM

## 2023-05-28 RX ORDER — SODIUM CHLORIDE 0.9 % (FLUSH) 0.9 %
10 SYRINGE (ML) INJECTION
Status: DISCONTINUED | OUTPATIENT
Start: 2023-05-28 | End: 2023-05-30 | Stop reason: HOSPADM

## 2023-05-28 RX ORDER — TALC
6 POWDER (GRAM) TOPICAL NIGHTLY PRN
Status: DISCONTINUED | OUTPATIENT
Start: 2023-05-28 | End: 2023-05-30 | Stop reason: HOSPADM

## 2023-05-28 RX ORDER — PREDNISONE 5 MG/1
5 TABLET ORAL DAILY
Status: DISCONTINUED | OUTPATIENT
Start: 2023-05-28 | End: 2023-05-30 | Stop reason: HOSPADM

## 2023-05-28 RX ORDER — SODIUM BICARBONATE 650 MG/1
1300 TABLET ORAL 2 TIMES DAILY
Status: DISCONTINUED | OUTPATIENT
Start: 2023-05-28 | End: 2023-05-30

## 2023-05-28 RX ORDER — PROMETHAZINE HYDROCHLORIDE 12.5 MG/1
12.5 TABLET ORAL EVERY 6 HOURS PRN
Status: DISCONTINUED | OUTPATIENT
Start: 2023-05-28 | End: 2023-05-30 | Stop reason: HOSPADM

## 2023-05-28 RX ORDER — ONDANSETRON 8 MG/1
8 TABLET, ORALLY DISINTEGRATING ORAL EVERY 6 HOURS PRN
Status: DISCONTINUED | OUTPATIENT
Start: 2023-05-28 | End: 2023-05-30 | Stop reason: HOSPADM

## 2023-05-28 RX ORDER — ACETAMINOPHEN 325 MG/1
650 TABLET ORAL EVERY 4 HOURS PRN
Status: DISCONTINUED | OUTPATIENT
Start: 2023-05-28 | End: 2023-05-30 | Stop reason: HOSPADM

## 2023-05-28 RX ORDER — DROPERIDOL 2.5 MG/ML
1.25 INJECTION, SOLUTION INTRAMUSCULAR; INTRAVENOUS
Status: COMPLETED | OUTPATIENT
Start: 2023-05-28 | End: 2023-05-28

## 2023-05-28 RX ORDER — TACROLIMUS 1 MG/1
4 CAPSULE ORAL 2 TIMES DAILY
Status: DISCONTINUED | OUTPATIENT
Start: 2023-05-28 | End: 2023-05-29

## 2023-05-28 RX ADMIN — CEFEPIME 2 G: 2 INJECTION, POWDER, FOR SOLUTION INTRAVENOUS at 03:05

## 2023-05-28 RX ADMIN — SODIUM CHLORIDE: 9 INJECTION, SOLUTION INTRAVENOUS at 03:05

## 2023-05-28 RX ADMIN — PREDNISONE 5 MG: 5 TABLET ORAL at 08:05

## 2023-05-28 RX ADMIN — CEFEPIME 2 G: 2 INJECTION, POWDER, FOR SOLUTION INTRAVENOUS at 08:05

## 2023-05-28 RX ADMIN — POTASSIUM CHLORIDE 40 MEQ: 10 CAPSULE, COATED, EXTENDED RELEASE ORAL at 06:05

## 2023-05-28 RX ADMIN — ASPIRIN 81 MG: 81 TABLET, COATED ORAL at 08:05

## 2023-05-28 RX ADMIN — CEFEPIME 2 G: 2 INJECTION, POWDER, FOR SOLUTION INTRAVENOUS at 11:05

## 2023-05-28 RX ADMIN — SODIUM BICARBONATE 1300 MG: 650 TABLET ORAL at 07:05

## 2023-05-28 RX ADMIN — IOHEXOL 75 ML: 350 INJECTION, SOLUTION INTRAVENOUS at 03:05

## 2023-05-28 RX ADMIN — ATORVASTATIN CALCIUM 10 MG: 10 TABLET, FILM COATED ORAL at 07:05

## 2023-05-28 RX ADMIN — TACROLIMUS 4 MG: 1 CAPSULE ORAL at 05:05

## 2023-05-28 RX ADMIN — MORPHINE SULFATE 6 MG: 2 INJECTION, SOLUTION INTRAMUSCULAR; INTRAVENOUS at 01:05

## 2023-05-28 RX ADMIN — MYCOPHENOLATE MOFETIL 500 MG: 250 CAPSULE ORAL at 09:05

## 2023-05-28 RX ADMIN — MAGNESIUM SULFATE 2 G: 2 INJECTION INTRAVENOUS at 10:05

## 2023-05-28 RX ADMIN — ACETAMINOPHEN 650 MG: 325 TABLET ORAL at 12:05

## 2023-05-28 RX ADMIN — SODIUM CHLORIDE: 9 INJECTION, SOLUTION INTRAVENOUS at 11:05

## 2023-05-28 RX ADMIN — SODIUM CHLORIDE 1000 ML: 9 INJECTION, SOLUTION INTRAVENOUS at 01:05

## 2023-05-28 RX ADMIN — TACROLIMUS 4 MG: 1 CAPSULE ORAL at 08:05

## 2023-05-28 RX ADMIN — ERGOCALCIFEROL 50000 UNITS: 1.25 CAPSULE ORAL at 08:05

## 2023-05-28 RX ADMIN — POTASSIUM CHLORIDE 40 MEQ: 10 CAPSULE, COATED, EXTENDED RELEASE ORAL at 07:05

## 2023-05-28 RX ADMIN — MYCOPHENOLATE MOFETIL 500 MG: 250 CAPSULE ORAL at 07:05

## 2023-05-28 RX ADMIN — SODIUM BICARBONATE 1300 MG: 650 TABLET ORAL at 08:05

## 2023-05-28 RX ADMIN — ATORVASTATIN CALCIUM 10 MG: 10 TABLET, FILM COATED ORAL at 03:05

## 2023-05-28 RX ADMIN — MAGNESIUM SULFATE 2 G: 2 INJECTION INTRAVENOUS at 12:05

## 2023-05-28 RX ADMIN — DROPERIDOL 1.25 MG: 2.5 INJECTION, SOLUTION INTRAMUSCULAR; INTRAVENOUS at 01:05

## 2023-05-28 NOTE — HPI
28-year-old female with PMH of ESRD secondary to diabetic nephropathy I and hypertension now s/p kidney and pancreas transplants on 11/3/2022 (Thymo induction, CMV D-/R+). 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  She presents to ER today w complaints of increased urinary frequency and subjective temp- 100.5. She also reports significant chills, nausea, and one episode of non-bloody, bilious vomiting. She denies chest pain, shortness of breath, abdominal pain, dysuria, hematuria, diarrhea, constipation, and black/bloody stools. WBC mildly elevated above patients baseline, sCr 1.1 from BL 0.8, Lipase wnl, UA consistent with infection( w many bacteria and positive nitrites). Blood and urine cultures obtained. Given Cefepime and KTS consulted for admission. Patient admitted with fever, suspect urosepsis/pyelonephritis. Cont Cefepime, plan for kidney US in am, tele ordered and cont IV fluid resuscitation.     Despite IV bolus, pt w tachycardia 130-140, ER recommending CTA r/o PE. Discussed w staff Nephrologist and ok to proceed w CTA as medically warranted. Cont hydration. Will cont to monitor kidney function.

## 2023-05-28 NOTE — ASSESSMENT & PLAN NOTE
- Despite IV bolus, pt w tachycardia 130-140  - CTA r/o PE per ER. Discussed w staff Nephrologist and ok to proceed w CTA as medically warranted  - CTA prior to txp in may and sept neg for PE, BLE US in sept neg for DVT  - Cont hydration  - monitor kidney function

## 2023-05-28 NOTE — ASSESSMENT & PLAN NOTE
- Chronic Prophylactic Immunosuppression- cont to check prograf level daily.  Assess for toxicity and adjust level as needed  - plan to cont MMF for now

## 2023-05-28 NOTE — ASSESSMENT & PLAN NOTE
- was to have urodynamics after admit in jan for e coli UTI  - kidney US ordered to assess hydronephrosis

## 2023-05-28 NOTE — ASSESSMENT & PLAN NOTE
- PMH of ESRD 2/2 diabetic nephropathy I and hypertension s/p kidney and pancreas transplants on 11/3/2022 (Thymo induction, CMV D-/R+)  - Post-op course significant for urinary retention, GEORGE requiring biopsy 12/8 suspicious for ABMR (tx w IVIG 12/14 and 1/26) and e.coli UTI (last admitted end of January w UTI- treated w Cipro EOT 2/5/23)  - admit for urosepsis  - cont cefepime  - cont IV fluids  - f/u blood and urine cx  - Kidney US in am

## 2023-05-28 NOTE — H&P
[facility] Dept  Kidney Transplant  H&P      Subjective:     Chief Complaint/Reason for Admission: UTI/urosepsis    History of Present Illness:  28-year-old female with PMH of ESRD secondary to diabetic nephropathy I and hypertension now s/p kidney and pancreas transplants on 11/3/2022 (Thymo induction, CMV D-/R+). 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  She presents to ER today w complaints of increased urinary frequency and subjective temp- 100.5. She also reports significant chills, nausea, and one episode of non-bloody, bilious vomiting. She denies chest pain, shortness of breath, abdominal pain, dysuria, hematuria, diarrhea, constipation, and black/bloody stools. WBC mildly elevated above patients baseline, sCr 1.1 from BL 0.8, Lipase wnl, UA consistent with infection( w many bacteria and positive nitrites). Blood and urine cultures obtained. Given Cefepime and KTS consulted for admission. Patient admitted with fever, suspect urosepsis/pyelonephritis. Cont Cefepime, plan for kidney US in am, tele ordered and cont IV fluid resuscitation.     Despite IV bolus, pt w tachycardia 130-140, ER recommending CTA r/o PE. Discussed w staff Nephrologist and ok to proceed w CTA as medically warranted. Cont hydration. Will cont to monitor kidney function.      Review of patient's allergies indicates:  No Known Allergies    Past Medical History:   Diagnosis Date    Acute cystitis without hematuria 11/25/2022    Acute kidney injury superimposed on chronic kidney disease 4/7/2021    Anemia     Anemia in ESRD (end-stage renal disease) 7/29/2020    Lab Results  Component Value Date   WBC 7.23 07/29/2020   HGB 7.1 (L) 07/29/2020   HCT 22.2 (L) 07/29/2020   MCV 91 07/29/2020    (H) 07/29/2020   Following with hematology and scheduled to undergo iron  infusions    Bacteremia due to Escherichia coli 1/23/2023    Bilious vomiting with nausea 12/12/2022    Chronic hypertension with exacerbation during pregnancy in second trimester 11/6/2020    Current regimen (11/6/20):  - Carvedilol 12.5 mg BID - Nifedipine 30 mg daily Baseline CKD + proteinuria    Chronic kidney disease     Depression     Diabetes mellitus     Diabetic retinopathy     Diarrhea     Encounter for blood transfusion     End stage renal failure on dialysis     Fever 12/1/2022    Fever of undetermined origin 12/12/2022    Gastroparesis     Glaucoma     Hepatomegaly 4/29/2021    History of diabetes mellitus, type I 12/20/2022    Hx of psychiatric care     Hyperlipidemia     Hypertension     Nausea and vomiting 12/12/2022    Nephrotic syndrome     Nephrotic syndrome 3/4/2021    Nonspecific reaction to tuberculin skin test without active tuberculosis 10/1/2021    Orthostatic hypotension 1/25/2023    Palpitations     Poor fetal growth affecting management of mother in second trimester 10/15/2020    Pyelonephritis, acute 11/26/2022    Restrictive lung disease     Retinal detachment     Sepsis 12/1/2022    Sepsis 11/25/2022    Sepsis due to Escherichia coli 1/23/2023    Severe pre-eclampsia in second trimester 11/6/2020    Severe sepsis 11/25/2022    SIRS (systemic inflammatory response syndrome) 12/6/2022    Status post pancreas transplantation 11/26/2022 11/2/2022    Type 1 diabetes mellitus with end-stage renal disease (ESRD) 2/24/2015    Followed by Dr. Mayo.  Last A1C was 13  Currently on lantus 20 units qAM and humolog 10 units with meals  - a fetal echocardiogram around 22-24 weeks - needs eye exam, podiatry exam  - needs EKG, maternal echo and fu with cardiology  - ASA 81mg, folic acid 4mg PO daily - hemoglobin A1c every 4-6 weeks -24 hour urine for protein and creatinine clearance should be performed. Patient will also need a     Past Surgical History:    Procedure Laterality Date    AV FISTULA PLACEMENT Left 04/07/2021    Procedure: CREATION, AV FISTULA;  Surgeon: Robreto Ryan MD;  Location: [facility] OR 2ND FLR;  Service: Peripheral Vascular;  Laterality: Left;    COLONOSCOPY N/A 03/16/2022    Procedure: COLONOSCOPY;  Surgeon: Tavo Kwok MD;  Location: [facility] ENDO (4TH FLR);  Service: Endoscopy;  Laterality: N/A;  Questionable history of delayed gastric emptying longstanding diabetes now on eating pre transplant workup for history of nausea vomiting which seems to have improved with dialysis also chronic diarrhea and history of anemia pre transplant workup for kidney transplant. 3    CYSTOSCOPY      ESOPHAGOGASTRODUODENOSCOPY N/A 03/16/2022    Procedure: EGD (ESOPHAGOGASTRODUODENOSCOPY);  Surgeon: Tavo Kwok MD;  Location: [facility] ENDO (4TH FLR);  Service: Endoscopy;  Laterality: N/A;  Questionable history of delayed gastric emptying longstanding diabetes now on eating pre transplant workup for history of nausea vomiting which seems to have improved with dialysis also chronic diarrhea and history of anemia pre transplant workup for    FISTULOGRAM N/A 08/11/2021    Procedure: Fistulogram;  Surgeon: Roberto Ryan MD;  Location: [facility] CATH LAB;  Service: Cardiology;  Laterality: N/A;    KIDNEY TRANSPLANT Right 11/02/2022    Procedure: TRANSPLANT, KIDNEY;  Surgeon: Kumar Rodriges Jr., MD;  Location: [facility] OR 2ND FLR;  Service: Transplant;  Laterality: Right;    LASER PHOTOCOAGULATION OF RETINA Right 05/31/2022    Procedure: PHOTOCOAGULATION, RETINA, USING LASER;  Surgeon: Maximilian Montaño MD;  Location: [facility] OR 1ST FLR;  Service: Ophthalmology;  Laterality: Right;    PERCUTANEOUS TRANSLUMINAL ANGIOPLASTY OF ARTERIOVENOUS FISTULA N/A 08/11/2021    Procedure: PTA, AV FISTULA;  Surgeon: Roberto Ryan MD;  Location: [facility] CATH LAB;  Service: Cardiology;  Laterality: N/A;    REMOVAL IMPLANT, POSTERIOR SEGMENT, INTRAOCULAR Right  02/01/2022    Procedure: REMOVAL IMPLANT, POSTERIOR SEGMENT, INTRAOCULAR;  Surgeon: Maximilian Montaño MD;  Location: [facility] OR 1ST FLR;  Service: Ophthalmology;  Laterality: Right;    REPAIR OF RETINAL DETACHMENT WITH VITRECTOMY Right 01/25/2022    Procedure: REPAIR, RETINAL DETACHMENT, WITH VITRECTOMY, MEMBRANE PEEL, LASER, INJECTION OF GAS VS OIL;  Surgeon: Maximilian Montaño MD;  Location: [facility] OR 1ST FLR;  Service: Ophthalmology;  Laterality: Right;    REPAIR, RETINAL DETACHMENT, COMPLEX, WITH VITRECTOMY AND MEMBRANE PEELING Right 3/21/2023    Procedure: REPAIR,RETINAL DETACHMENT,COMPLEX,WITH VITRECTOMY AND MEMBRANE PEELING;  Surgeon: Maximilian Montaño MD;  Location: [facility] OR 1ST FLR;  Service: Ophthalmology;  Laterality: Right;  25ga    REVISION OF ARTERIOVENOUS FISTULA Left 12/10/2021    Procedure: REVISION, AV FISTULA with BVT;  Surgeon: Roberto Ryan MD;  Location: [facility] OR 2ND FLR;  Service: Peripheral Vascular;  Laterality: Left;    TRANSPLANTATION OF PANCREAS N/A 11/02/2022    Procedure: TRANSPLANT, PANCREAS;  Surgeon: Kumar Rodriges Jr., MD;  Location: [facility] OR 2ND FLR;  Service: Transplant;  Laterality: N/A;    VITRECTOMY BY PARS PLANA APPROACH Right 02/01/2022    Procedure: VITRECTOMY, PARS PLANA APPROACH;  Surgeon: Maximilian Montaño MD;  Location: [facility] OR 1ST FLR;  Service: Ophthalmology;  Laterality: Right;    VITRECTOMY BY PARS PLANA APPROACH Right 05/31/2022    Procedure: VITRECTOMY, PARS PLANA APPROACH;  Surgeon: Maximilian Montaño MD;  [facility];  Service: Ophthalmology;  Laterality: Right;     Family History       Problem Relation (Age of Onset)    Diabetes Brother    Heart disease Father    Hypertension Mother          Tobacco Use    Smoking status: Never    Smokeless tobacco: Never   Substance and Sexual Activity    Alcohol use: No    Drug use: No    Sexual activity: Not Currently     Partners: Male     Comment: monogamous        Review of Systems  "  Constitutional:  Positive for chills, fatigue and fever. Negative for activity change, appetite change and unexpected weight change.   HENT:  Negative for postnasal drip.    Eyes:  Negative for visual disturbance.   Respiratory:  Negative for cough, chest tightness, shortness of breath and wheezing.    Cardiovascular:  Negative for chest pain and leg swelling.   Gastrointestinal:  Negative for abdominal distention, abdominal pain, anal bleeding, blood in stool, constipation, diarrhea, nausea, rectal pain and vomiting.   Genitourinary:  Positive for frequency. Negative for decreased urine volume, difficulty urinating, dysuria and hematuria.   Musculoskeletal:  Negative for arthralgias and gait problem.   Skin:  Negative for rash and wound.   Neurological:  Negative for dizziness, light-headedness, numbness and headaches.   Psychiatric/Behavioral:  The patient is not nervous/anxious.    Objective:     Vital Signs (Most Recent):  Temp: 99.2 °F (37.3 °C) (05/28/23 0126)  Pulse: (!) 140 (05/28/23 0223)  Resp: 20 (05/28/23 0223)  BP: (!) 162/75 (05/28/23 0223)  SpO2: 99 % (05/28/23 0223)  Height: 5' 4" (162.6 cm)  Weight: 69.4 kg (153 lb)  Body mass index is 26.26 kg/m².      Physical Exam     Laboratory  CBC:   Recent Labs   Lab 05/27/23 2234   WBC 12.39   RBC 4.80   HGB 14.3   HCT 46.1      MCV 96   MCH 29.8   MCHC 31.0*     CMP:   Recent Labs   Lab 05/27/23 2234   GLU 82   CALCIUM 10.6*   ALBUMIN 4.5   PROT 8.8*      K 4.1   CO2 19*      BUN 26*   CREATININE 1.1   ALKPHOS 84   ALT 47*   AST 23     Coagulation: No results for input(s): PT, APTT in the last 168 hours.  Labs within the past 24 hours have been reviewed.    Diagnostic Results:  Chest X-Ray: No results found. However, due to the size of the patient record, not all encounters were searched. Please check Results Review for a complete set of results.    Patient was SARS-CoV-2 /COVID-19 tested with negative results.     Assessment/Plan: "     Cardiac/Vascular  Sinus tachycardia  - Despite IV bolus, pt w tachycardia 130-140  - CTA r/o PE per ER. Discussed w staff Nephrologist and ok to proceed w CTA as medically warranted  - CTA prior to txp in may and sept neg for PE, BLE US in sept neg for DVT  - Cont hydration  - monitor kidney function      Urinary retention with incomplete bladder emptying  - was to have urodynamics after admit in  for e coli UTI  - kidney US ordered to assess hydronephrosis      Status post -donor simultaneous pancreas kidney transplantation  - PMH of ESRD 2/2 diabetic nephropathy I and hypertension s/p kidney and pancreas transplants on 11/3/2022 (Thymo induction, CMV D-/R+)  - Post-op course significant for urinary retention, GEORGE requiring biopsy  suspicious for ABMR (tx w IVIG  and ) and e.coli UTI (last admitted end of January w UTI- treated w Cipro EOT 23)  - admit for urosepsis  - cont cefepime  - cont IV fluids  - f/u blood and urine cx  - Kidney US in am    ID  At risk for opportunistic infections        Immunology/Multi System  Prophylactic immunotherapy  - Chronic Prophylactic Immunosuppression- cont to check prograf level daily.  Assess for toxicity and adjust level as needed  - plan to cont MMF for now      Long-term use of immunosuppressant medication  - see prophylactic immuno        Discharge Planning:  Discussed plan of care.  No plan for discharge today.      Karen Davis, NP  Kidney Transplant  [facility]

## 2023-05-28 NOTE — ED NOTES
Telemetry Verification   Patient placed on Telemetry Box  Verified with War Room  Box # [id number]   Monitor Tech    Rate Sinus tach   Rhythm 115

## 2023-05-28 NOTE — SUBJECTIVE & OBJECTIVE
Subjective:     Chief Complaint/Reason for Admission: UTI/urosepsis    History of Present Illness:  28-year-old female with PMH of ESRD secondary to diabetic nephropathy I and hypertension now s/p kidney and pancreas transplants on 11/3/2022 (Thymo induction, CMV D-/R+). 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  She presents to ER today w complaints of increased urinary frequency and subjective temp- 100.5. She also reports significant chills, nausea, and one episode of non-bloody, bilious vomiting. She denies chest pain, shortness of breath, abdominal pain, dysuria, hematuria, diarrhea, constipation, and black/bloody stools. WBC mildly elevated above patients baseline, sCr 1.1 from BL 0.8, Lipase wnl, UA consistent with infection( w many bacteria and positive nitrites). Blood and urine cultures obtained. Given Cefepime and KTS consulted for admission. Patient admitted with fever, suspect urosepsis/pyelonephritis. Cont Cefepime, plan for kidney US in am, tele ordered and cont IV fluid resuscitation.     Despite IV bolus, pt w tachycardia 130-140, ER recommending CTA r/o PE. Discussed w staff Nephrologist and ok to proceed w CTA as medically warranted. Cont hydration. Will cont to monitor kidney function.      Review of patient's allergies indicates:  No Known Allergies    Past Medical History:   Diagnosis Date    Acute cystitis without hematuria 11/25/2022    Acute kidney injury superimposed on chronic kidney disease 4/7/2021    Anemia     Anemia in ESRD (end-stage renal disease) 7/29/2020    Lab Results  Component Value Date   WBC 7.23 07/29/2020   HGB 7.1 (L) 07/29/2020   HCT 22.2 (L) 07/29/2020   MCV 91 07/29/2020    (H) 07/29/2020   Following with hematology and scheduled to undergo iron infusions    Bacteremia due to Escherichia coli  1/23/2023    Bilious vomiting with nausea 12/12/2022    Chronic hypertension with exacerbation during pregnancy in second trimester 11/6/2020    Current regimen (11/6/20):  - Carvedilol 12.5 mg BID - Nifedipine 30 mg daily Baseline CKD + proteinuria    Chronic kidney disease     Depression     Diabetes mellitus     Diabetic retinopathy     Diarrhea     Encounter for blood transfusion     End stage renal failure on dialysis     Fever 12/1/2022    Fever of undetermined origin 12/12/2022    Gastroparesis     Glaucoma     Hepatomegaly 4/29/2021    History of diabetes mellitus, type I 12/20/2022    Hx of psychiatric care     Hyperlipidemia     Hypertension     Nausea and vomiting 12/12/2022    Nephrotic syndrome     Nephrotic syndrome 3/4/2021    Nonspecific reaction to tuberculin skin test without active tuberculosis 10/1/2021    Orthostatic hypotension 1/25/2023    Palpitations     Poor fetal growth affecting management of mother in second trimester 10/15/2020    Pyelonephritis, acute 11/26/2022    Restrictive lung disease     Retinal detachment     Sepsis 12/1/2022    Sepsis 11/25/2022    Sepsis due to Escherichia coli 1/23/2023    Severe pre-eclampsia in second trimester 11/6/2020    Severe sepsis 11/25/2022    SIRS (systemic inflammatory response syndrome) 12/6/2022    Status post pancreas transplantation 11/26/2022 11/2/2022    Type 1 diabetes mellitus with end-stage renal disease (ESRD) 2/24/2015    Followed by Dr. Mayo.  Last A1C was 13  Currently on lantus 20 units qAM and humolog 10 units with meals  - a fetal echocardiogram around 22-24 weeks - needs eye exam, podiatry exam  - needs EKG, maternal echo and fu with cardiology  - ASA 81mg, folic acid 4mg PO daily - hemoglobin A1c every 4-6 weeks -24 hour urine for protein and creatinine clearance should be performed. Patient will also need a     Past Surgical History:   Procedure Laterality Date    AV FISTULA PLACEMENT Left 04/07/2021    Procedure: CREATION,  AV FISTULA;  Surgeon: Roberto Ryan MD;  Location: [facility] OR 2ND FLR;  Service: Peripheral Vascular;  Laterality: Left;    COLONOSCOPY N/A 03/16/2022    Procedure: COLONOSCOPY;  Surgeon: Tavo Kwok MD;  Location: [facility] (4TH FLR);  Service: Endoscopy;  Laterality: N/A;  Questionable history of delayed gastric emptying longstanding diabetes now on eating pre transplant workup for history of nausea vomiting which seems to have improved with dialysis also chronic diarrhea and history of anemia pre transplant workup for kidney transplant. 3    CYSTOSCOPY      ESOPHAGOGASTRODUODENOSCOPY N/A 03/16/2022    Procedure: EGD (ESOPHAGOGASTRODUODENOSCOPY);  Surgeon: Tavo Kwok MD;  Location: [facility] ENDO (4TH FLR);  Service: Endoscopy;  Laterality: N/A;  Questionable history of delayed gastric emptying longstanding diabetes now on eating pre transplant workup for history of nausea vomiting which seems to have improved with dialysis also chronic diarrhea and history of anemia pre transplant workup for    FISTULOGRAM N/A 08/11/2021    Procedure: Fistulogram;  Surgeon: Roberto Ryan MD;  Location: [facility] CATH LAB;  Service: Cardiology;  Laterality: N/A;    KIDNEY TRANSPLANT Right 11/02/2022    Procedure: TRANSPLANT, KIDNEY;  Surgeon: Kumar Rodriges Jr., MD;  Location: [facility] 2ND FLR;  Service: Transplant;  Laterality: Right;    LASER PHOTOCOAGULATION OF RETINA Right 05/31/2022    Procedure: PHOTOCOAGULATION, RETINA, USING LASER;  Surgeon: Maximilian Montaño MD;  Location: [facility] OR 1ST FLR;  Service: Ophthalmology;  Laterality: Right;    PERCUTANEOUS TRANSLUMINAL ANGIOPLASTY OF ARTERIOVENOUS FISTULA N/A 08/11/2021    Procedure: PTA, AV FISTULA;  Surgeon: Roberto Ryan MD;  Location: [facility] CATH LAB;  Service: Cardiology;  Laterality: N/A;    REMOVAL IMPLANT, POSTERIOR SEGMENT, INTRAOCULAR Right 02/01/2022    Procedure: REMOVAL IMPLANT, POSTERIOR SEGMENT, INTRAOCULAR;  Surgeon: Maximilian REYNOLDS  MD Sabra;  Location: [facility] OR 1ST FLR;  Service: Ophthalmology;  Laterality: Right;    REPAIR OF RETINAL DETACHMENT WITH VITRECTOMY Right 01/25/2022    Procedure: REPAIR, RETINAL DETACHMENT, WITH VITRECTOMY, MEMBRANE PEEL, LASER, INJECTION OF GAS VS OIL;  Surgeon: Maximilian Montaño MD;  Location: [facility] OR 1ST FLR;  Service: Ophthalmology;  Laterality: Right;    REPAIR, RETINAL DETACHMENT, COMPLEX, WITH VITRECTOMY AND MEMBRANE PEELING Right 3/21/2023    Procedure: REPAIR,RETINAL DETACHMENT,COMPLEX,WITH VITRECTOMY AND MEMBRANE PEELING;  Surgeon: Maximilian Montaño MD;  Location: [facility] OR 1ST FLR;  Service: Ophthalmology;  Laterality: Right;  25ga    REVISION OF ARTERIOVENOUS FISTULA Left 12/10/2021    Procedure: REVISION, AV FISTULA with BVT;  Surgeon: Roberto Ryan MD;  Location: [facility] OR 2ND FLR;  Service: Peripheral Vascular;  Laterality: Left;    TRANSPLANTATION OF PANCREAS N/A 11/02/2022    Procedure: TRANSPLANT, PANCREAS;  Surgeon: Kumar Rodriges Jr., MD;  Location: [facility] OR 2ND FLR;  Service: Transplant;  Laterality: N/A;    VITRECTOMY BY PARS PLANA APPROACH Right 02/01/2022    Procedure: VITRECTOMY, PARS PLANA APPROACH;  Surgeon: Maximilian Montaño MD;  [facility];  Service: Ophthalmology;  Laterality: Right;    VITRECTOMY BY PARS PLANA APPROACH Right 05/31/2022    Procedure: VITRECTOMY, PARS PLANA APPROACH;  Surgeon: Maximilian Montaño MD;  [facility];  Service: Ophthalmology;  Laterality: Right;     Family History       Problem Relation (Age of Onset)    Diabetes Brother    Heart disease Father    Hypertension Mother          Tobacco Use    Smoking status: Never    Smokeless tobacco: Never   Substance and Sexual Activity    Alcohol use: No    Drug use: No    Sexual activity: Not Currently     Partners: Male     Comment: monogamous        Review of Systems   Constitutional:  Positive for chills, fatigue and fever. Negative for activity change, appetite change  "and unexpected weight change.   HENT:  Negative for postnasal drip.    Eyes:  Negative for visual disturbance.   Respiratory:  Negative for cough, chest tightness, shortness of breath and wheezing.    Cardiovascular:  Negative for chest pain and leg swelling.   Gastrointestinal:  Negative for abdominal distention, abdominal pain, anal bleeding, blood in stool, constipation, diarrhea, nausea, rectal pain and vomiting.   Genitourinary:  Positive for frequency. Negative for decreased urine volume, difficulty urinating, dysuria and hematuria.   Musculoskeletal:  Negative for arthralgias and gait problem.   Skin:  Negative for rash and wound.   Neurological:  Negative for dizziness, light-headedness, numbness and headaches.   Psychiatric/Behavioral:  The patient is not nervous/anxious.    Objective:     Vital Signs (Most Recent):  Temp: 99.2 °F (37.3 °C) (05/28/23 0126)  Pulse: (!) 140 (05/28/23 0223)  Resp: 20 (05/28/23 0223)  BP: (!) 162/75 (05/28/23 0223)  SpO2: 99 % (05/28/23 0223)  Height: 5' 4" (162.6 cm)  Weight: 69.4 kg (153 lb)  Body mass index is 26.26 kg/m².      Physical Exam  Vitals and nursing note reviewed.   Constitutional:       Appearance: Normal appearance.   HENT:      Head: Normocephalic.   Eyes:      General: No scleral icterus.  Cardiovascular:      Rate and Rhythm: Regular rhythm. Tachycardia present.   Pulmonary:      Effort: Pulmonary effort is normal.      Breath sounds: Normal breath sounds.   Abdominal:      General: Abdomen is flat. Bowel sounds are normal.      Palpations: Abdomen is soft.   Musculoskeletal:         General: Normal range of motion.      Cervical back: Normal range of motion.   Skin:     General: Skin is warm and dry.      Capillary Refill: Capillary refill takes less than 2 seconds.   Neurological:      General: No focal deficit present.      Mental Status: She is alert and oriented to person, place, and time.   Psychiatric:         Mood and Affect: Mood normal.         " Behavior: Behavior normal.         Thought Content: Thought content normal.         Judgment: Judgment normal.        Laboratory  CBC:   Recent Labs   Lab 05/27/23 2234   WBC 12.39   RBC 4.80   HGB 14.3   HCT 46.1      MCV 96   MCH 29.8   MCHC 31.0*     CMP:   Recent Labs   Lab 05/27/23 2234   GLU 82   CALCIUM 10.6*   ALBUMIN 4.5   PROT 8.8*      K 4.1   CO2 19*      BUN 26*   CREATININE 1.1   ALKPHOS 84   ALT 47*   AST 23     Coagulation: No results for input(s): PT, APTT in the last 168 hours.  Labs within the past 24 hours have been reviewed.    Diagnostic Results:  Chest X-Ray: No results found. However, due to the size of the patient record, not all encounters were searched. Please check Results Review for a complete set of results.    Patient was SARS-CoV-2 /COVID-19 tested with negative results.

## 2023-05-28 NOTE — ED TRIAGE NOTES
"Isabela Read, a 28 y.o. female presents to the ED w/ complaint of urinary frequency, chills, body aches, and N/V. Pt states she had a kidney and pancreas transplant 11/2022. Pt states she previously had an infx 6 months ago and "I feel the same way I felt when I last had infection." Pt tachycardic and shivering on arrival.    Triage note:  Chief Complaint   Patient presents with    Urinary Frequency     Transplant 6 months ago of kidney and pancreas. Pt stating has had urinary frequency and fever today     Review of patient's allergies indicates:  No Known Allergies  Past Medical History:   Diagnosis Date    Acute cystitis without hematuria 11/25/2022    Acute kidney injury superimposed on chronic kidney disease 4/7/2021    Anemia     Anemia in ESRD (end-stage renal disease) 7/29/2020    Lab Results  Component Value Date   WBC 7.23 07/29/2020   HGB 7.1 (L) 07/29/2020   HCT 22.2 (L) 07/29/2020   MCV 91 07/29/2020    (H) 07/29/2020   Following with hematology and scheduled to undergo iron infusions    Bacteremia due to Escherichia coli 1/23/2023    Bilious vomiting with nausea 12/12/2022    Chronic hypertension with exacerbation during pregnancy in second trimester 11/6/2020    Current regimen (11/6/20):  - Carvedilol 12.5 mg BID - Nifedipine 30 mg daily Baseline CKD + proteinuria    Chronic kidney disease     Depression     Diabetes mellitus     Diabetic retinopathy     Diarrhea     Encounter for blood transfusion     End stage renal failure on dialysis     Fever 12/1/2022    Fever of undetermined origin 12/12/2022    Gastroparesis     Glaucoma     Hepatomegaly 4/29/2021    History of diabetes mellitus, type I 12/20/2022    Hx of psychiatric care     Hyperlipidemia     Hypertension     Nausea and vomiting 12/12/2022    Nephrotic syndrome     Nephrotic syndrome 3/4/2021    Nonspecific reaction to tuberculin skin test without active tuberculosis 10/1/2021    Orthostatic hypotension 1/25/2023    " Palpitations     Poor fetal growth affecting management of mother in second trimester 10/15/2020    Pyelonephritis, acute 11/26/2022    Restrictive lung disease     Retinal detachment     Sepsis 12/1/2022    Sepsis 11/25/2022    Sepsis due to Escherichia coli 1/23/2023    Severe pre-eclampsia in second trimester 11/6/2020    Severe sepsis 11/25/2022    SIRS (systemic inflammatory response syndrome) 12/6/2022    Status post pancreas transplantation 11/26/2022 11/2/2022    Type 1 diabetes mellitus with end-stage renal disease (ESRD) 2/24/2015    Followed by Dr. Mayo.  Last A1C was 13  Currently on lantus 20 units qAM and humolog 10 units with meals  - a fetal echocardiogram around 22-24 weeks - needs eye exam, podiatry exam  - needs EKG, maternal echo and fu with cardiology  - ASA 81mg, folic acid 4mg PO daily - hemoglobin A1c every 4-6 weeks -24 hour urine for protein and creatinine clearance should be performed. Patient will also need a

## 2023-05-28 NOTE — ED PROVIDER NOTES
Encounter Date: 5/27/2023       History     Chief Complaint   Patient presents with    Urinary Frequency     Transplant 6 months ago of kidney and pancreas. Pt stating has had urinary frequency and fever today     28-year-old female with PMH of ESRD secondary to diabetes and hypertension now s/p kidney and pancreas transplants (11/2022) presents with urinary frequency.  Patient states that around 6:00 p.m. she started to develop increased urinary frequency a fever of 100.5°.  She also reports significant chills, nausea, and one episode of non-bloody vomiting.  She denies chest pain, shortness of breath, abdominal pain, dysuria, hematuria, diarrhea, constipation, and black/bloody stools.  She reports compliance with all of her medications.  She did not take any Tylenol after her fever.     The history is provided by the patient and medical records. History limited by: pt cooperation.   Review of patient's allergies indicates:  No Known Allergies  Past Medical History:   Diagnosis Date    Acute cystitis without hematuria 11/25/2022    Acute kidney injury superimposed on chronic kidney disease 4/7/2021    Anemia     Anemia in ESRD (end-stage renal disease) 7/29/2020    Lab Results  Component Value Date   WBC 7.23 07/29/2020   HGB 7.1 (L) 07/29/2020   HCT 22.2 (L) 07/29/2020   MCV 91 07/29/2020    (H) 07/29/2020   Following with hematology and scheduled to undergo iron infusions    Bacteremia due to Escherichia coli 1/23/2023    Bilious vomiting with nausea 12/12/2022    Chronic hypertension with exacerbation during pregnancy in second trimester 11/6/2020    Current regimen (11/6/20):  - Carvedilol 12.5 mg BID - Nifedipine 30 mg daily Baseline CKD + proteinuria    Chronic kidney disease     Depression     Diabetes mellitus     Diabetic retinopathy     Diarrhea     Encounter for blood transfusion     End stage renal failure on dialysis     Fever 12/1/2022    Fever of undetermined origin 12/12/2022    Gastroparesis      Glaucoma     Hepatomegaly 4/29/2021    History of diabetes mellitus, type I 12/20/2022    Hx of psychiatric care     Hyperlipidemia     Hypertension     Nausea and vomiting 12/12/2022    Nephrotic syndrome     Nephrotic syndrome 3/4/2021    Nonspecific reaction to tuberculin skin test without active tuberculosis 10/1/2021    Orthostatic hypotension 1/25/2023    Palpitations     Poor fetal growth affecting management of mother in second trimester 10/15/2020    Pyelonephritis, acute 11/26/2022    Restrictive lung disease     Retinal detachment     Sepsis 12/1/2022    Sepsis 11/25/2022    Sepsis due to Escherichia coli 1/23/2023    Severe pre-eclampsia in second trimester 11/6/2020    Severe sepsis 11/25/2022    SIRS (systemic inflammatory response syndrome) 12/6/2022    Status post pancreas transplantation 11/26/2022 11/2/2022    Type 1 diabetes mellitus with end-stage renal disease (ESRD) 2/24/2015    Followed by Dr. Mayo.  Last A1C was 13  Currently on lantus 20 units qAM and humolog 10 units with meals  - a fetal echocardiogram around 22-24 weeks - needs eye exam, podiatry exam  - needs EKG, maternal echo and fu with cardiology  - ASA 81mg, folic acid 4mg PO daily - hemoglobin A1c every 4-6 weeks -24 hour urine for protein and creatinine clearance should be performed. Patient will also need a     Past Surgical History:   Procedure Laterality Date    AV FISTULA PLACEMENT Left 04/07/2021    Procedure: CREATION, AV FISTULA;  Surgeon: Roberto Ryan MD;  Location: [facility] OR [address] [place] [address];  Service: Peripheral Vascular;  Laterality: Left;    COLONOSCOPY N/A 03/16/2022    Procedure: COLONOSCOPY;  Surgeon: Tavo Kwok MD;  Location: [facility] (4TH FLR);  Service: Endoscopy;  Laterality: N/A;  Questionable history of delayed gastric emptying longstanding diabetes now on eating pre transplant workup for history of nausea vomiting which seems to have improved with dialysis also chronic diarrhea and history of  anemia pre transplant workup for kidney transplant. 3    CYSTOSCOPY      ESOPHAGOGASTRODUODENOSCOPY N/A 03/16/2022    Procedure: EGD (ESOPHAGOGASTRODUODENOSCOPY);  Surgeon: Tavo Kwok MD;  Location: [facility] (4TH FLR);  Service: Endoscopy;  Laterality: N/A;  Questionable history of delayed gastric emptying longstanding diabetes now on eating pre transplant workup for history of nausea vomiting which seems to have improved with dialysis also chronic diarrhea and history of anemia pre transplant workup for    FISTULOGRAM N/A 08/11/2021    Procedure: Fistulogram;  Surgeon: Roberto Ryan MD;  Location: [facility] CATH LAB;  Service: Cardiology;  Laterality: N/A;    KIDNEY TRANSPLANT Right 11/02/2022    Procedure: TRANSPLANT, KIDNEY;  Surgeon: Kumar Rodriges Jr., MD;  Location: [facility] OR 2ND FLR;  Service: Transplant;  Laterality: Right;    LASER PHOTOCOAGULATION OF RETINA Right 05/31/2022    Procedure: PHOTOCOAGULATION, RETINA, USING LASER;  Surgeon: Maximilian Montaño MD;  Location: [facility] OR 1ST FLR;  Service: Ophthalmology;  Laterality: Right;    PERCUTANEOUS TRANSLUMINAL ANGIOPLASTY OF ARTERIOVENOUS FISTULA N/A 08/11/2021    Procedure: PTA, AV FISTULA;  Surgeon: Roberto Ryan MD;  Location: [facility] CATH LAB;  Service: Cardiology;  Laterality: N/A;    REMOVAL IMPLANT, POSTERIOR SEGMENT, INTRAOCULAR Right 02/01/2022    Procedure: REMOVAL IMPLANT, POSTERIOR SEGMENT, INTRAOCULAR;  Surgeon: Maximilian Montaño MD;  Location: [facility] OR 1ST FLR;  Service: Ophthalmology;  Laterality: Right;    REPAIR OF RETINAL DETACHMENT WITH VITRECTOMY Right 01/25/2022    Procedure: REPAIR, RETINAL DETACHMENT, WITH VITRECTOMY, MEMBRANE PEEL, LASER, INJECTION OF GAS VS OIL;  Surgeon: Maximilian Montaño MD;  Location: [facility] OR 1ST FLR;  Service: Ophthalmology;  Laterality: Right;    REPAIR, RETINAL DETACHMENT, COMPLEX, WITH VITRECTOMY AND MEMBRANE PEELING Right 3/21/2023    Procedure: REPAIR,RETINAL DETACHMENT,COMPLEX,WITH  VITRECTOMY AND MEMBRANE PEELING;  Surgeon: Maximilian Montaño MD;  Location: [facility] OR 1ST FLR;  Service: Ophthalmology;  Laterality: Right;  25ga    REVISION OF ARTERIOVENOUS FISTULA Left 12/10/2021    Procedure: REVISION, AV FISTULA with BVT;  Surgeon: Roberto Ryan MD;  Location: [facility] OR 2ND FLR;  Service: Peripheral Vascular;  Laterality: Left;    TRANSPLANTATION OF PANCREAS N/A 11/02/2022    Procedure: TRANSPLANT, PANCREAS;  Surgeon: Kumar Rodriges Jr., MD;  Location: [facility] OR 2ND FLR;  Service: Transplant;  Laterality: N/A;    VITRECTOMY BY PARS PLANA APPROACH Right 02/01/2022    Procedure: VITRECTOMY, PARS PLANA APPROACH;  Surgeon: Maximilian Montaño MD;  Location: [facility] OR 1ST FLR;  Service: Ophthalmology;  Laterality: Right;    VITRECTOMY BY PARS PLANA APPROACH Right 05/31/2022    Procedure: VITRECTOMY, PARS PLANA APPROACH;  Surgeon: Maximilian Montaño MD;  Location: [facility] OR 1ST FLR;  Service: Ophthalmology;  Laterality: Right;     Family History   Problem Relation Age of Onset    Hypertension Mother     Heart disease Father     Diabetes Brother     Celiac disease Neg Hx     Cirrhosis Neg Hx     Colon cancer Neg Hx     Colon polyps Neg Hx     Crohn's disease Neg Hx     Inflammatory bowel disease Neg Hx     Liver cancer Neg Hx     Liver disease Neg Hx     Rectal cancer Neg Hx     Stomach cancer Neg Hx     Ulcerative colitis Neg Hx     Esophageal cancer Neg Hx     Hemochromatosis Neg Hx     Pancreatic cancer Neg Hx     Kidney cancer Neg Hx     Bladder Cancer Neg Hx     Uterine cancer Neg Hx     Ovarian cancer Neg Hx      Social History     Tobacco Use    Smoking status: Never    Smokeless tobacco: Never   Substance Use Topics    Alcohol use: No    Drug use: No     Review of Systems    Physical Exam     Initial Vitals [05/27/23 2132]   BP Pulse Resp Temp SpO2   108/67 (!) 133 18 99.1 °F (37.3 °C) 100 %      MAP       --         Physical Exam    Nursing note and vitals reviewed.  Constitutional: She  appears well-developed and well-nourished. She is not diaphoretic.   Shivering patient   HENT:   Head: Normocephalic and atraumatic.   Eyes: Conjunctivae and EOM are normal. Pupils are equal, round, and reactive to light.   Neck: Neck supple.   Normal range of motion.  Cardiovascular:  Regular rhythm, normal heart sounds and intact distal pulses.           No murmur heard.  Tachycardic   Pulmonary/Chest: Breath sounds normal. No respiratory distress. She has no wheezes. She has no rhonchi. She has no rales.   Abdominal: Abdomen is soft. She exhibits no distension. There is no abdominal tenderness. There is no rebound and no guarding.   Musculoskeletal:         General: No tenderness or edema.      Cervical back: Normal range of motion and neck supple.     Neurological: She is alert and oriented to person, place, and time.   Skin: Skin is warm and dry. Capillary refill takes less than 2 seconds.       ED Course   Procedures  Labs Reviewed   CBC W/ AUTO DIFFERENTIAL - Abnormal; Notable for the following components:       Result Value    MCHC 31.0 (*)     Gran # (ANC) 10.7 (*)     Immature Grans (Abs) 0.05 (*)     Lymph # 0.6 (*)     Gran % 86.3 (*)     Lymph % 5.2 (*)     All other components within normal limits   COMPREHENSIVE METABOLIC PANEL - Abnormal; Notable for the following components:    CO2 19 (*)     BUN 26 (*)     Calcium 10.6 (*)     Total Protein 8.8 (*)     ALT 47 (*)     All other components within normal limits   URINALYSIS, REFLEX TO URINE CULTURE - Abnormal; Notable for the following components:    Appearance, UA Hazy (*)     Protein, UA 1+ (*)     Ketones, UA 1+ (*)     Occult Blood UA 1+ (*)     Nitrite, UA Positive (*)     Leukocytes, UA 3+ (*)     All other components within normal limits    Narrative:     Specimen Source->Urine   URINALYSIS MICROSCOPIC - Abnormal; Notable for the following components:    RBC, UA 16 (*)     WBC, UA >100 (*)     WBC Clumps, UA Moderate (*)     All other  "components within normal limits    Narrative:     Specimen Source->Urine   CBC W/ AUTO DIFFERENTIAL - Abnormal; Notable for the following components:    MCHC 31.5 (*)     Gran # (ANC) 10.5 (*)     Lymph # 0.4 (*)     Gran % 94.0 (*)     Lymph % 3.3 (*)     Mono % 2.3 (*)     All other components within normal limits   CULTURE, BLOOD   CULTURE, BLOOD   CULTURE, URINE   LIPASE   AMYLASE   SARS-COV-2 RNA AMPLIFICATION, QUAL   TROPONIN I   B-TYPE NATRIURETIC PEPTIDE   TACROLIMUS LEVEL   COMPREHENSIVE METABOLIC PANEL   MAGNESIUM   LACTIC ACID, PLASMA   LIPASE   POCT URINE PREGNANCY   ISTAT LACTATE   POCT GLUCOSE MONITORING CONTINUOUS          Imaging Results              CTA Chest Non-Coronary (PE Studies) (In process)                      X-Ray Chest AP Portable (Final result)  Result time 05/28/23 00:28:30      Final result by Randy Sullivan MD (05/28/23 00:28:30)                   Impression:      No acute cardiopulmonary finding identified on this single view.  No detrimental change when compared with 01/21/2023.      Electronically signed by: Randy Sullivan MD  Date:    05/28/2023  Time:    00:28               Narrative:    EXAMINATION:  XR CHEST AP PORTABLE    CLINICAL HISTORY:  Provided history is "Sepsis;  ".    TECHNIQUE:  One view of the chest.    COMPARISON:  01/21/2023 and 11/25/2022.    FINDINGS:  Cardiac wires overlie the chest.  Cardiomediastinal silhouette is not enlarged.  No confluent area of consolidation.  No large pleural effusion.  No distinct pneumothorax.  No detrimental change when compared with the prior study.                                       Medications   aspirin EC tablet 81 mg (has no administration in time range)   ergocalciferol capsule 50,000 Units (has no administration in time range)   mycophenolate capsule 500 mg (has no administration in time range)   predniSONE tablet 5 mg (has no administration in time range)   atorvastatin tablet 10 mg (10 mg Oral Given 5/28/23 0304) "   sodium bicarbonate tablet 1,300 mg (has no administration in time range)   tacrolimus capsule 4 mg (has no administration in time range)   sodium chloride 0.9% flush 10 mL (has no administration in time range)   ondansetron disintegrating tablet 8 mg (has no administration in time range)   melatonin tablet 6 mg (has no administration in time range)   acetaminophen tablet 650 mg (has no administration in time range)   acetaminophen tablet 650 mg (has no administration in time range)   ceFEPIme (MAXIPIME) 2 g in dextrose 5 % in water (D5W) 5 % 50 mL IVPB (MB+) (has no administration in time range)   0.9%  NaCl infusion ( Intravenous New Bag 5/28/23 0308)   promethazine tablet 12.5 mg (has no administration in time range)   ceFEPIme (MAXIPIME) 2 g in dextrose 5 % in water (D5W) 5 % 50 mL IVPB (MB+) (0 g Intravenous Stopped 5/28/23 0046)   ondansetron injection 4 mg (4 mg Intravenous Given 5/27/23 2306)   sodium chloride 0.9% bolus 500 mL 500 mL (0 mLs Intravenous Stopped 5/28/23 0127)   morphine injection 6 mg (6 mg Intravenous Given 5/28/23 0120)   droPERidol injection 1.25 mg (1.25 mg Intravenous Given 5/28/23 0120)   sodium chloride 0.9% bolus 1,000 mL 1,000 mL (0 mLs Intravenous Stopped 5/28/23 0255)   iohexoL (OMNIPAQUE 350) injection 75 mL (75 mLs Intravenous Given 5/28/23 0358)     Medical Decision Making:   Initial Assessment:   Afebrile, tachycardic and borderline hypotensive 28-year-old female s/p kidney/pancreas transplants presents with urinary frequency and fever  Differential Diagnosis:   UTI, sepsis, rejection, GEORGE, electrolyte abnormality, pneumonia  Clinical Tests:   Lab Tests: Ordered and Reviewed  Radiological Study: Ordered and Reviewed  Medical Tests: Ordered and Reviewed  Sepsis Perfusion Assessment: "I attest a sepsis perfusion exam was performed within 6 hours of sepsis, severe sepsis, or septic shock presentation, following fluid resuscitation."  ED Management:  Will initiate treatment with  zofran. Sepsis documentation below. See ED course for additional information.     This patient does have evidence of infective focus  My overall impression is sepsis.  Source: Urinary Tract  Antibiotics given- Antibiotics (72h ago, onward)    Start     Stop Route Frequency Ordered    05/27/23 2230  ceFEPIme (MAXIPIME) 2 g in dextrose 5 % in water (D5W) 5 %   50 mL IVPB (MB+)         05/28 1029 IV ED 1 Time 05/27/23 2228      Latest lactate reviewed- normal  Organ dysfunction indicated by n/a    Fluid challenge Contraindicated- Fluid bolus is contraindicated in this patient due to hx of kidney transplant and not hypotensive     Post- resuscitation assessment Yes Perfusion exam was performed within 6 hours of septic shock presentation after bolus shows Adequate tissue perfusion assessed by non-invasive monitoring       Will Not start Pressors- Levophed for MAP of 65  Source control achieved by: antibiotics             ED Course as of 05/28/23 0429   Sun May 28, 2023   0000 CBC auto differential(!)  CBC significant for uptrending leukocytosis from 4 to 12 without anemia [BD]   0001 Lipase: 14  Inconsistent with pancreatitis [BD]   0001 Comprehensive metabolic panel(!)  CMP near baseline with no significant electrolyte derangement or impaired renal function [BD]   0016 SARS-CoV-2 RNA, Amplification, Qual: Negative [BD]   0016 POC Lactate: 1.01 [BD]   0045 X-Ray Chest AP Portable  Chest x-ray independently interpreted by me shows no acute process such as pneumonia, pneumothorax, or pulmonary edema.    [BD]   0126 Urinalysis, Reflex to Urine Culture Urine, Clean Catch(!)  UA consistent with UTI, which we are already treating. Will page Kidney Transplant for consultation/admission [BD]   0131 I discussed the case with kidney transplant who will evaluate the patient [BD]   0230 Preg Test, Ur: Negative [BD]   0251 Troponin I: <0.006  ACS unlikely [BD]   0251 BNP: <10  RV strain unlikely [BD]   0251 Pulse(!): 140  Given  persistent tachycardia, we will obtain CTA PE study [BD]   0424 CTA Chest Non-Coronary (PE Studies)  No large PE on my independently interpretation [BD]   5106 I spoke again with Kidney Transplant who will admit the patient to the [facility]. Pt remains tachycardic but hemodynamically stable and agrees with the plan.  [BD]      ED Course User Index  [BD] Chris King MD                   Clinical Impression:   Final diagnoses:  [R00.0] Tachycardia  [N39.0] Urinary tract infection without hematuria, site unspecified (Primary)  [Z79.899, Z94.0] Immunosuppressive management encounter following kidney transplant  [Z94.0] Status post -donor simultaneous pancreas kidney transplantation  [A41.9, N39.0] Sepsis secondary to UTI  [N39.0] UTI (urinary tract infection)        ED Disposition Condition    Admit Stable                Chris King MD  Resident  [phone]

## 2023-05-28 NOTE — PLAN OF CARE
T max 101.3.  Tylenol admin per mar with complete resolution.  Tele showing sinus tach in the 110s.    Other VSS  Ambulating independently in room.  Non slip socks worn when OOB  Left upper arm fistula +/+  Right AC 20G PIV dressing CDI  Serum potassium 3.0.  Potassium admin as per MAR  Serum magnesium 1.1.  Magnesium replacement admin as per MAR  Kidney ultrasound complete. Stable exam  NS infusing at 125mL/hr  Pt. Refusing DVT prophylaxis   Bed in lowest locked position.  Call light within reach.  Instructed to call for assistance.  Pt. Expressed understanding.  Educated on plan of care

## 2023-05-28 NOTE — CARE UPDATE
"RAPID RESPONSE NURSE CHART REVIEW       Chart Reviewed: 05/28/2023, 8:46 AM    MRN: [id number]  Bed: 23667/60169 A    Dx: SIRS (systemic inflammatory response syndrome)    Isabela Read has a past medical history of Acute cystitis without hematuria, Acute kidney injury superimposed on chronic kidney disease, Anemia, Anemia in ESRD (end-stage renal disease), Bacteremia due to Escherichia coli, Bilious vomiting with nausea, Chronic hypertension with exacerbation during pregnancy in second trimester, Chronic kidney disease, Depression, Diabetes mellitus, Diabetic retinopathy, Diarrhea, Encounter for blood transfusion, End stage renal failure on dialysis, Fever, Fever of undetermined origin, Gastroparesis, Glaucoma, Hepatomegaly, History of diabetes mellitus, type I, psychiatric care, Hyperlipidemia, Hypertension, Nausea and vomiting, Nephrotic syndrome, Nephrotic syndrome, Nonspecific reaction to tuberculin skin test without active tuberculosis, Orthostatic hypotension, Palpitations, Poor fetal growth affecting management of mother in second trimester, Pyelonephritis, acute, Restrictive lung disease, Retinal detachment, Sepsis, Sepsis, Sepsis due to Escherichia coli, Severe pre-eclampsia in second trimester, Severe sepsis, SIRS (systemic inflammatory response syndrome), Status post pancreas transplantation, and Type 1 diabetes mellitus with end-stage renal disease (ESRD).    Last VS: /61 (BP Location: Right arm, Patient Position: Lying)   Pulse (!) 130   Temp 99.3 °F (37.4 °C) (Oral)   Resp 18   Ht 5' 4" (1.626 m)   Wt 73.2 kg (161 lb 6 oz)   LMP  (LMP Unknown) Comment: "I"m on the shot so I don't get none"  SpO2 98%   Breastfeeding No   BMI 27.70 kg/m²     24H Vital Sign Range:  Temp:  [98.9 °F (37.2 °C)-101.3 °F (38.5 °C)]   Pulse:  [112-140]   Resp:  [16-24]   BP: (100-162)/(53-94)   SpO2:  [96 %-100 %]     Level of Consciousness (AVPU): alert    Recent Labs     05/27/23 2234 05/28/23  9869 "   WBC 12.39 11.13   HGB 14.3 12.4   HCT 46.1 39.4    217       Recent Labs     05/27/23  2234 05/28/23  0347    142   K 4.1 3.0*    116*   CO2 19* 17*   CREATININE 1.1 1.0   GLU 82 93   MG  --  1.1*        MEWS score: 4    Charge RNRitika  contacted. No concerns verbalized at this time. Instructed to call [phone] for further concerns or assistance.    Racheal Hadley RN

## 2023-05-29 PROBLEM — E83.39 HYPOPHOSPHATEMIA: Status: ACTIVE | Noted: 2023-05-29

## 2023-05-29 PROBLEM — R65.10 SIRS (SYSTEMIC INFLAMMATORY RESPONSE SYNDROME): Status: RESOLVED | Noted: 2022-11-25 | Resolved: 2023-05-29

## 2023-05-29 PROBLEM — R91.8 ABNORMAL CT SCAN OF LUNG: Status: RESOLVED | Noted: 2022-09-14 | Resolved: 2023-05-29

## 2023-05-29 PROBLEM — R33.9 URINARY RETENTION WITH INCOMPLETE BLADDER EMPTYING: Status: RESOLVED | Noted: 2022-12-01 | Resolved: 2023-05-29

## 2023-05-29 LAB
ALBUMIN SERPL BCP-MCNC: 3.1 G/DL (ref 3.5–5.2)
AMYLASE SERPL-CCNC: 52 U/L (ref 20–110)
ANION GAP SERPL CALC-SCNC: 10 MMOL/L (ref 8–16)
BACTERIA #/AREA URNS AUTO: ABNORMAL /HPF
BASOPHILS # BLD AUTO: 0.03 K/UL (ref 0–0.2)
BASOPHILS NFR BLD: 0.2 % (ref 0–1.9)
BILIRUB UR QL STRIP: NEGATIVE
BUN SERPL-MCNC: 13 MG/DL (ref 6–20)
CALCIUM SERPL-MCNC: 9.3 MG/DL (ref 8.7–10.5)
CHLORIDE SERPL-SCNC: 114 MMOL/L (ref 95–110)
CLARITY UR REFRACT.AUTO: CLEAR
CO2 SERPL-SCNC: 16 MMOL/L (ref 23–29)
COLOR UR AUTO: YELLOW
CREAT SERPL-MCNC: 0.8 MG/DL (ref 0.5–1.4)
DIFFERENTIAL METHOD: ABNORMAL
EOSINOPHIL # BLD AUTO: 0.1 K/UL (ref 0–0.5)
EOSINOPHIL NFR BLD: 0.5 % (ref 0–8)
ERYTHROCYTE [DISTWIDTH] IN BLOOD BY AUTOMATED COUNT: 13.2 % (ref 11.5–14.5)
EST. GFR  (NO RACE VARIABLE): >60 ML/MIN/1.73 M^2
GLUCOSE SERPL-MCNC: 97 MG/DL (ref 70–110)
GLUCOSE UR QL STRIP: NEGATIVE
HCT VFR BLD AUTO: 38.5 % (ref 37–48.5)
HGB BLD-MCNC: 12.1 G/DL (ref 12–16)
HGB UR QL STRIP: ABNORMAL
HYALINE CASTS UR QL AUTO: 0 /LPF
IMM GRANULOCYTES # BLD AUTO: 0.03 K/UL (ref 0–0.04)
IMM GRANULOCYTES NFR BLD AUTO: 0.2 % (ref 0–0.5)
KETONES UR QL STRIP: ABNORMAL
LEUKOCYTE ESTERASE UR QL STRIP: ABNORMAL
LIPASE SERPL-CCNC: 11 U/L (ref 4–60)
LYMPHOCYTES # BLD AUTO: 0.8 K/UL (ref 1–4.8)
LYMPHOCYTES NFR BLD: 5.9 % (ref 18–48)
MAGNESIUM SERPL-MCNC: 2.2 MG/DL (ref 1.6–2.6)
MCH RBC QN AUTO: 29.9 PG (ref 27–31)
MCHC RBC AUTO-ENTMCNC: 31.4 G/DL (ref 32–36)
MCV RBC AUTO: 95 FL (ref 82–98)
MICROSCOPIC COMMENT: ABNORMAL
MONOCYTES # BLD AUTO: 1.1 K/UL (ref 0.3–1)
MONOCYTES NFR BLD: 8.7 % (ref 4–15)
NEUTROPHILS # BLD AUTO: 10.8 K/UL (ref 1.8–7.7)
NEUTROPHILS NFR BLD: 84.5 % (ref 38–73)
NITRITE UR QL STRIP: NEGATIVE
NRBC BLD-RTO: 0 /100 WBC
PH UR STRIP: 7 [PH] (ref 5–8)
PHOSPHATE SERPL-MCNC: 1.9 MG/DL (ref 2.7–4.5)
PLATELET # BLD AUTO: 210 K/UL (ref 150–450)
PMV BLD AUTO: 10.8 FL (ref 9.2–12.9)
POTASSIUM SERPL-SCNC: 4.6 MMOL/L (ref 3.5–5.1)
PROT UR QL STRIP: ABNORMAL
RBC # BLD AUTO: 4.05 M/UL (ref 4–5.4)
RBC #/AREA URNS AUTO: >100 /HPF (ref 0–4)
SODIUM SERPL-SCNC: 140 MMOL/L (ref 136–145)
SP GR UR STRIP: 1.01 (ref 1–1.03)
SQUAMOUS #/AREA URNS AUTO: 1 /HPF
TACROLIMUS BLD-MCNC: 11 NG/ML (ref 5–15)
URN SPEC COLLECT METH UR: ABNORMAL
WBC # BLD AUTO: 12.78 K/UL (ref 3.9–12.7)
WBC #/AREA URNS AUTO: 42 /HPF (ref 0–5)

## 2023-05-29 PROCEDURE — 81001 URINALYSIS AUTO W/SCOPE: CPT | Performed by: PHYSICIAN ASSISTANT

## 2023-05-29 PROCEDURE — 36415 COLL VENOUS BLD VENIPUNCTURE: CPT | Performed by: NURSE PRACTITIONER

## 2023-05-29 PROCEDURE — 99233 PR SUBSEQUENT HOSPITAL CARE,LEVL III: ICD-10-PCS | Mod: ,,, | Performed by: PHYSICIAN ASSISTANT

## 2023-05-29 PROCEDURE — 80197 ASSAY OF TACROLIMUS: CPT | Performed by: NURSE PRACTITIONER

## 2023-05-29 PROCEDURE — 99233 SBSQ HOSP IP/OBS HIGH 50: CPT | Mod: ,,, | Performed by: PHYSICIAN ASSISTANT

## 2023-05-29 PROCEDURE — 63600175 PHARM REV CODE 636 W HCPCS: Performed by: PHYSICIAN ASSISTANT

## 2023-05-29 PROCEDURE — 83690 ASSAY OF LIPASE: CPT | Performed by: NURSE PRACTITIONER

## 2023-05-29 PROCEDURE — 85025 COMPLETE CBC W/AUTO DIFF WBC: CPT | Performed by: NURSE PRACTITIONER

## 2023-05-29 PROCEDURE — 82150 ASSAY OF AMYLASE: CPT | Performed by: NURSE PRACTITIONER

## 2023-05-29 PROCEDURE — 83735 ASSAY OF MAGNESIUM: CPT | Performed by: NURSE PRACTITIONER

## 2023-05-29 PROCEDURE — 63600175 PHARM REV CODE 636 W HCPCS: Performed by: NURSE PRACTITIONER

## 2023-05-29 PROCEDURE — 20600001 HC STEP DOWN PRIVATE ROOM

## 2023-05-29 PROCEDURE — 80069 RENAL FUNCTION PANEL: CPT | Performed by: NURSE PRACTITIONER

## 2023-05-29 PROCEDURE — 25000003 PHARM REV CODE 250: Performed by: NURSE PRACTITIONER

## 2023-05-29 PROCEDURE — 87086 URINE CULTURE/COLONY COUNT: CPT | Performed by: PHYSICIAN ASSISTANT

## 2023-05-29 PROCEDURE — 25000003 PHARM REV CODE 250: Performed by: PHYSICIAN ASSISTANT

## 2023-05-29 RX ORDER — TACROLIMUS 1 MG/1
3 CAPSULE ORAL 2 TIMES DAILY
Status: DISCONTINUED | OUTPATIENT
Start: 2023-05-29 | End: 2023-05-30

## 2023-05-29 RX ADMIN — TACROLIMUS 4 MG: 1 CAPSULE ORAL at 08:05

## 2023-05-29 RX ADMIN — SODIUM CHLORIDE: 9 INJECTION, SOLUTION INTRAVENOUS at 08:05

## 2023-05-29 RX ADMIN — Medication 2 TABLET: at 10:05

## 2023-05-29 RX ADMIN — Medication 6 MG: at 08:05

## 2023-05-29 RX ADMIN — Medication 2 TABLET: at 08:05

## 2023-05-29 RX ADMIN — MYCOPHENOLATE MOFETIL 500 MG: 250 CAPSULE ORAL at 08:05

## 2023-05-29 RX ADMIN — TACROLIMUS 3 MG: 1 CAPSULE ORAL at 05:05

## 2023-05-29 RX ADMIN — Medication 2 TABLET: at 03:05

## 2023-05-29 RX ADMIN — SODIUM BICARBONATE 1300 MG: 650 TABLET ORAL at 08:05

## 2023-05-29 RX ADMIN — PREDNISONE 5 MG: 5 TABLET ORAL at 08:05

## 2023-05-29 RX ADMIN — ATORVASTATIN CALCIUM 10 MG: 10 TABLET, FILM COATED ORAL at 08:05

## 2023-05-29 RX ADMIN — CEFEPIME 2 G: 2 INJECTION, POWDER, FOR SOLUTION INTRAVENOUS at 03:05

## 2023-05-29 RX ADMIN — CEFEPIME 2 G: 2 INJECTION, POWDER, FOR SOLUTION INTRAVENOUS at 11:05

## 2023-05-29 RX ADMIN — ASPIRIN 81 MG: 81 TABLET, COATED ORAL at 08:05

## 2023-05-29 RX ADMIN — ACETAMINOPHEN 650 MG: 325 TABLET ORAL at 12:05

## 2023-05-29 RX ADMIN — CEFEPIME 2 G: 2 INJECTION, POWDER, FOR SOLUTION INTRAVENOUS at 08:05

## 2023-05-29 NOTE — HOSPITAL COURSE
Pt admitted for fever and tachycardia. CTA negative for PE. Started on IV cefepime and underwent infectious work up revealing UTI. UA with WBCs, nitrites. Urine culture sent. Blood cultures ngtd.     Interval history: Tmax 101.3 yesterday ~ noon. Pt feeling better today. Urine culture resulted with multiple orgs. Will ask lab to speciate and repeat culture today. Continue cefepime. If spikes fever again will consider adding vanc. Cr and amylase/lipase wnl. Blood sugars wnl. Cont to monitor.

## 2023-05-29 NOTE — PLAN OF CARE
Patient is AAOx4.  Afebrile this shift.   RA  Continuous IV fluid d/c'd. IV abx continued.   Patient is resting comfortably between care.   Call light remains within reach.   Instructed to call for assistance.   Nonskid socks when oob.   Bed is in lowest position with wheels locked.   [facility]

## 2023-05-29 NOTE — PROGRESS NOTES
[facility]  Kidney Transplant  Progress Note      Reason for Follow-up: Reassessment of Kidney, Pancreas Transplant - 11/3/2022  (#1) recipient and management of immunosuppression.    ORGAN:  RIGHT KIDNEY   Donor Type:  Donation after Brain Death   Donor CMV Status: Negative  Donor HBcAB:Negative  Donor HCV Status:Negative  Donor HBV KEE: Negative  Donor HCV KEE: Negative      Subjective:   History of Present Illness:  28-year-old female with PMH of ESRD secondary to diabetic nephropathy I and hypertension now s/p kidney and pancreas transplants on 11/3/2022 (Thymo induction, CMV D-/R+). 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  She presents to ER today w complaints of increased urinary frequency and subjective temp- 100.5. She also reports significant chills, nausea, and one episode of non-bloody, bilious vomiting. She denies chest pain, shortness of breath, abdominal pain, dysuria, hematuria, diarrhea, constipation, and black/bloody stools. WBC mildly elevated above patients baseline, sCr 1.1 from BL 0.8, Lipase wnl, UA consistent with infection( w many bacteria and positive nitrites). Blood and urine cultures obtained. Given Cefepime and KTS consulted for admission. Patient admitted with fever, suspect urosepsis/pyelonephritis. Cont Cefepime, plan for kidney US in am, tele ordered and cont IV fluid resuscitation.     Despite IV bolus, pt w tachycardia 130-140, ER recommending CTA r/o PE. Discussed w staff Nephrologist and ok to proceed w CTA as medically warranted. Cont hydration. Will cont to monitor kidney function.    Hospital Course:  Pt admitted for fever and tachycardia. CTA negative for PE. Started on IV cefepime and underwent infectious work up revealing UTI. UA with WBCs, nitrites. Urine culture sent. Blood cultures ngtd.      Interval history: Tmax 101.3 yesterday ~ noon. Pt feeling better today. Urine culture resulted with multiple orgs. Will ask lab to speciate and repeat culture today. Continue cefepime. If spikes fever again will consider adding vanc. Cr and amylase/lipase wnl. Blood sugars wnl. Cont to monitor.     Past Medical, Surgical, Family, and Social History:   Unchanged from H&P.    Scheduled Meds:   aspirin  81 mg Oral Daily    atorvastatin  10 mg Oral QHS    ceFEPime (MAXIPIME) IVPB  2 g Intravenous Q8H    enoxparin  40 mg Subcutaneous Q24H (prophylaxis, 1700)    ergocalciferol  50,000 Units Oral Q7 Days    k phos di & mono-sod phos mono  2 tablet Oral TID    mycophenolate  500 mg Oral BID    predniSONE  5 mg Oral Daily    sodium bicarbonate  1,300 mg Oral BID    tacrolimus  3 mg Oral BID     Continuous Infusions:   sodium chloride 0.9% 125 mL/hr at 05/29/23 0810     PRN Meds:acetaminophen, acetaminophen, melatonin, ondansetron, promethazine, sodium chloride 0.9%    Intake/Output - Last 3 Shifts         05/27 0700  05/28 0659 05/28 0700  05/29 0659 05/29 0700  05/30 0659    P.O.  480     IV Piggyback 1549.7      Total Intake(mL/kg) 1549.7 (21.2) 480 (6.6)     Urine (mL/kg/hr)  1550 (0.9) 350 (1.5)    Total Output  1550 350    Net +1549.7 -1070 -350                    Review of Systems   Constitutional:  Positive for chills, fatigue and fever. Negative for activity change, appetite change and unexpected weight change.   HENT:  Negative for postnasal drip.    Eyes:  Negative for visual disturbance.   Respiratory:  Negative for cough, chest tightness, shortness of breath and wheezing.    Cardiovascular:  Negative for chest pain and leg swelling.   Gastrointestinal:  Negative for abdominal distention, abdominal pain, constipation, diarrhea, nausea and vomiting.   Genitourinary:  Positive for frequency (improved). Negative for decreased urine volume, difficulty urinating, dysuria and hematuria.   Musculoskeletal:  Negative  "for arthralgias and gait problem.   Skin:  Negative for rash and wound.   Allergic/Immunologic: Positive for immunocompromised state.   Neurological:  Negative for dizziness, light-headedness, numbness and headaches.   Psychiatric/Behavioral:  The patient is not nervous/anxious.     Objective:     Vital Signs (Most Recent):  Temp: 98.6 °F (37 °C) (05/29/23 0800)  Pulse: 102 (05/29/23 0800)  Resp: 18 (05/29/23 0800)  BP: (!) 142/77 (05/29/23 0800)  SpO2: 99 % (05/29/23 0800) Vital Signs (24h Range):  Temp:  [98.5 °F (36.9 °C)-101.3 °F (38.5 °C)] 98.6 °F (37 °C)  Pulse:  [] 102  Resp:  [18] 18  SpO2:  [98 %-100 %] 99 %  BP: (119-142)/(58-77) 142/77     Weight: 73.2 kg (161 lb 6 oz)  Height: 5' 4" (162.6 cm)  Body mass index is 27.7 kg/m².     Physical Exam  Vitals and nursing note reviewed.   Constitutional:       Appearance: Normal appearance.   HENT:      Head: Normocephalic.   Eyes:      General: No scleral icterus.  Cardiovascular:      Rate and Rhythm: Normal rate and regular rhythm.   Pulmonary:      Effort: Pulmonary effort is normal.      Breath sounds: Normal breath sounds.   Abdominal:      General: Abdomen is flat. Bowel sounds are normal. There is no distension.      Palpations: Abdomen is soft.      Tenderness: There is no abdominal tenderness.      Comments: Well healed surgical scar   Musculoskeletal:         General: Normal range of motion.      Cervical back: Normal range of motion.   Skin:     General: Skin is warm and dry.      Capillary Refill: Capillary refill takes less than 2 seconds.   Neurological:      General: No focal deficit present.      Mental Status: She is alert and oriented to person, place, and time.   Psychiatric:         Mood and Affect: Mood normal.         Behavior: Behavior normal.         Thought Content: Thought content normal.         Judgment: Judgment normal.        Laboratory:  CBC:   Recent Labs   Lab 05/27/23  2234 05/28/23  0347 05/29/23  0519   WBC 12.39 11.13 " "12.78*   RBC 4.80 4.13 4.05   HGB 14.3 12.4 12.1   HCT 46.1 39.4 38.5    217 210   MCV 96 95 95   MCH 29.8 30.0 29.9   MCHC 31.0* 31.5* 31.4*     CMP:   Recent Labs   Lab 23  2234 23  0347 23  0519   GLU 82 93 97   CALCIUM 10.6* 8.0* 9.3   ALBUMIN 4.5 3.2* 3.1*   PROT 8.8* 5.9*  --     142 140   K 4.1 3.0* 4.6   CO2 19* 17* 16*    116* 114*   BUN 26* 23* 13   CREATININE 1.1 1.0 0.8   ALKPHOS 84 60  --    ALT 47* 30  --    AST 23 14  --      Labs within the past 24 hours have been reviewed.    Diagnostic Results: Reviewed.     Assessment/Plan:     Urinary tract infection with hematuria  - admitted with fever and urinary frequency  - UA with WBC, nitrites  - culture with mult orgs, none in predominance, ask labs to speciate  - repeat urine culture  - on cefepime for h/o e.coli (urine and blood) in 2023; h/o Diphtheroids and Enterococcus Faecium low counts in dec 2022  - consider adding vanc if spikes again  - blood cultures ngtd    Long-term use of immunosuppressant medication  - see prophylactic immuno      Prophylactic immunotherapy  - Chronic Prophylactic Immunosuppression- cont to check prograf level daily.  Assess for toxicity and adjust level as needed  - plan to cont MMF for now      At risk for opportunistic infections  - completed per protocol  - send CMV PCR    Status post -donor simultaneous pancreas kidney transplantation  - PMH of ESRD 2/2 diabetic nephropathy I and hypertension s/p kidney and pancreas transplants on 11/3/2022 (Thymo induction, CMV D-/R+)  - Post-op course significant for urinary retention, GEORGE requiring biopsy  suspicious for ABMR (tx w IVIG  and ) and e.coli UTI (last admitted end of January w UTI- treated w Cipro EOT 23)  - admit for urosepsis - see "urinary tract infection"  - kidney US reviewed  - cr wnl, trend daily  - amylase/lipase wnl, trend daily    Hypophosphatemia      - Phos 1.9      - start PO phos " replacement      - monitor with daily renal function panel    Tachycardia  - Despite IV bolus, pt w tachycardia 130-140  - CTA r/o PE per ER. Discussed w staff Nephrologist and ok to proceed w CTA as medically warranted. CTA negative for PE  - Received IV hydration  - monitor kidney function  - Improving        Discharge Planning: Not a candidate for discharge at this time.       Tiara Cervantes PA-C  Kidney Transplant  [facility]

## 2023-05-29 NOTE — SUBJECTIVE & OBJECTIVE
Subjective:   History of Present Illness:  28-year-old female with PMH of ESRD secondary to diabetic nephropathy I and hypertension now s/p kidney and pancreas transplants on 11/3/2022 (Thymo induction, CMV D-/R+). 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  She presents to ER today w complaints of increased urinary frequency and subjective temp- 100.5. She also reports significant chills, nausea, and one episode of non-bloody, bilious vomiting. She denies chest pain, shortness of breath, abdominal pain, dysuria, hematuria, diarrhea, constipation, and black/bloody stools. WBC mildly elevated above patients baseline, sCr 1.1 from BL 0.8, Lipase wnl, UA consistent with infection( w many bacteria and positive nitrites). Blood and urine cultures obtained. Given Cefepime and KTS consulted for admission. Patient admitted with fever, suspect urosepsis/pyelonephritis. Cont Cefepime, plan for kidney US in am, tele ordered and cont IV fluid resuscitation.     Despite IV bolus, pt w tachycardia 130-140, ER recommending CTA r/o PE. Discussed w staff Nephrologist and ok to proceed w CTA as medically warranted. Cont hydration. Will cont to monitor kidney function.    Hospital Course:  Pt admitted for fever and tachycardia. CTA negative for PE. Started on IV cefepime and underwent infectious work up revealing UTI. UA with WBCs, nitrites. Urine culture sent. Blood cultures ngtd.     Interval history: Tmax 101.3 yesterday ~ noon. Pt feeling better today. Urine culture resulted with multiple orgs. Will ask lab to speciate and repeat culture today. Continue cefepime. If spikes fever again will consider adding vanc. Cr and amylase/lipase wnl. Blood sugars wnl. Cont to monitor.     Past Medical, Surgical, Family, and Social History:   Unchanged from H&P.    Scheduled Meds:    aspirin  81 mg Oral Daily    atorvastatin  10 mg Oral QHS    ceFEPime (MAXIPIME) IVPB  2 g Intravenous Q8H    enoxparin  40 mg Subcutaneous Q24H (prophylaxis, 1700)    ergocalciferol  50,000 Units Oral Q7 Days    k phos di & mono-sod phos mono  2 tablet Oral TID    mycophenolate  500 mg Oral BID    predniSONE  5 mg Oral Daily    sodium bicarbonate  1,300 mg Oral BID    tacrolimus  3 mg Oral BID     Continuous Infusions:   sodium chloride 0.9% 125 mL/hr at 05/29/23 0810     PRN Meds:acetaminophen, acetaminophen, melatonin, ondansetron, promethazine, sodium chloride 0.9%    Intake/Output - Last 3 Shifts         05/27 0700 05/28 0659 05/28 0700 05/29 0659 05/29 0700 05/30 0659    P.O.  480     IV Piggyback 1549.7      Total Intake(mL/kg) 1549.7 (21.2) 480 (6.6)     Urine (mL/kg/hr)  1550 (0.9) 350 (1.5)    Total Output  1550 350    Net +1549.7 -1070 -350                    Review of Systems   Constitutional:  Positive for chills, fatigue and fever. Negative for activity change, appetite change and unexpected weight change.   HENT:  Negative for postnasal drip.    Eyes:  Negative for visual disturbance.   Respiratory:  Negative for cough, chest tightness, shortness of breath and wheezing.    Cardiovascular:  Negative for chest pain and leg swelling.   Gastrointestinal:  Negative for abdominal distention, abdominal pain, constipation, diarrhea, nausea and vomiting.   Genitourinary:  Positive for frequency (improved). Negative for decreased urine volume, difficulty urinating, dysuria and hematuria.   Musculoskeletal:  Negative for arthralgias and gait problem.   Skin:  Negative for rash and wound.   Allergic/Immunologic: Positive for immunocompromised state.   Neurological:  Negative for dizziness, light-headedness, numbness and headaches.   Psychiatric/Behavioral:  The patient is not nervous/anxious.     Objective:     Vital Signs (Most Recent):  Temp: 98.6 °F (37 °C) (05/29/23 0800)  Pulse: 102 (05/29/23 0800)  Resp:  "18 (05/29/23 0800)  BP: (!) 142/77 (05/29/23 0800)  SpO2: 99 % (05/29/23 0800) Vital Signs (24h Range):  Temp:  [98.5 °F (36.9 °C)-101.3 °F (38.5 °C)] 98.6 °F (37 °C)  Pulse:  [] 102  Resp:  [18] 18  SpO2:  [98 %-100 %] 99 %  BP: (119-142)/(58-77) 142/77     Weight: 73.2 kg (161 lb 6 oz)  Height: 5' 4" (162.6 cm)  Body mass index is 27.7 kg/m².     Physical Exam  Vitals and nursing note reviewed.   Constitutional:       Appearance: Normal appearance.   HENT:      Head: Normocephalic.   Eyes:      General: No scleral icterus.  Cardiovascular:      Rate and Rhythm: Normal rate and regular rhythm.   Pulmonary:      Effort: Pulmonary effort is normal.      Breath sounds: Normal breath sounds.   Abdominal:      General: Abdomen is flat. Bowel sounds are normal. There is no distension.      Palpations: Abdomen is soft.      Tenderness: There is no abdominal tenderness.      Comments: Well healed surgical scar   Musculoskeletal:         General: Normal range of motion.      Cervical back: Normal range of motion.   Skin:     General: Skin is warm and dry.      Capillary Refill: Capillary refill takes less than 2 seconds.   Neurological:      General: No focal deficit present.      Mental Status: She is alert and oriented to person, place, and time.   Psychiatric:         Mood and Affect: Mood normal.         Behavior: Behavior normal.         Thought Content: Thought content normal.         Judgment: Judgment normal.        Laboratory:  CBC:   Recent Labs   Lab 05/27/23 2234 05/28/23 0347 05/29/23 0519   WBC 12.39 11.13 12.78*   RBC 4.80 4.13 4.05   HGB 14.3 12.4 12.1   HCT 46.1 39.4 38.5    217 210   MCV 96 95 95   MCH 29.8 30.0 29.9   MCHC 31.0* 31.5* 31.4*     CMP:   Recent Labs   Lab 05/27/23 2234 05/28/23 0347 05/29/23  0519   GLU 82 93 97   CALCIUM 10.6* 8.0* 9.3   ALBUMIN 4.5 3.2* 3.1*   PROT 8.8* 5.9*  --     142 140   K 4.1 3.0* 4.6   CO2 19* 17* 16*    116* 114*   BUN 26* 23* 13 "   CREATININE 1.1 1.0 0.8   ALKPHOS 84 60  --    ALT 47* 30  --    AST 23 14  --      Labs within the past 24 hours have been reviewed.    Diagnostic Results: Reviewed.

## 2023-05-29 NOTE — ASSESSMENT & PLAN NOTE
- Despite IV bolus, pt w tachycardia 130-140  - CTA r/o PE per ER. Discussed w staff Nephrologist and ok to proceed w CTA as medically warranted. CTA negative for PE  - Received IV hydration  - monitor kidney function  - Improving

## 2023-05-29 NOTE — PROGRESS NOTES
Admit Note     Met with patient to assess needs. Patient is a 28 y.o. single female, admitted for  UTI (urinary tract infection) [N39.0]  Tachycardia [R00.0]  Status post -donor kidney transplantation [Z94.0]  Immunosuppressive management encounter following kidney transplant [Z79.899, Z94.0]  At risk for opportunistic infections [Z91.89]  Long-term use of immunosuppressant medication [Z79.60]  Sepsis secondary to UTI [A41.9, N39.0]  Urinary tract infection without hematuria, site unspecified [N39.0].    Patient admitted from ED on 2023 .  At this time, patient presents as alert and oriented x 4, pleasant, good eye contact, well groomed, recall good, concentration/judgement good, average intelligence, calm, communicative, cooperative, asking and answering questions appropriately, and in high spirits.  At this time, patients caregiver presents as  not present at time of SW visit .    Household/Family Systems     Patient resides with patient's mother and brother, at [address] [place] [address].  Support system includes her brother Mustapha, her mother Kiersten Garcia, and her uncle Florentin.  Patient does not have dependents that are need of being cared for.     Patients primary caregiver is Kiersten Garcia, patients mother, phone number [phone].  Confirmed patients contact information is There is no home phone number on file.;   Telephone Information:   Mobile [phone]   .    During admission, patient's caregiver plans to stay in patient's room.  Confirmed patient and patients caregivers do have access to reliable transportation.    Cognitive Status/Learning     Patient reports reading ability as 12th grade and states patient does not have difficulty with reading, writing, seeing, hearing, comprehension, learning, and memory.  Patient reports patient learns best by visual learning.   Needed: No.   Highest education level: High School (9-12) or  GED    Vocation/Disability   .  Working for Income: No  If no, reason not working: Disability  Patient is disabled due to ESRD since .  Prior to disability, patient was employed at [employer].    Adherence     Patient reports a high level of adherence to patients health care regimen.  Adherence counseling and education provided. Patient verbalizes understanding.    Substance Use    Patient reports the following substance usage.    Tobacco: none, patient denies any use.  Alcohol: none, patient denies any use.  Illicit Drugs/Non-prescribed Medications: none, patient denies any use.    Patient states clear understanding of the potential impact of substance use.  Substance abstinence/cessation counseling, education and resources provided and reviewed.     Services Utilizing/ADLS    Infusion Service: Prior to admission, patient utilizing? no  Home Health: Prior to admission, patient utilizing? no  DME: Prior to admission, yes bpc  Pulmonary/Cardiac Rehab: Prior to admission, no  Dialysis:  Prior to admission, no  Transplant Specialty Pharmacy:  Prior to admission, yes; [facility].    Prior to admission, patient reports patient was independent with ADLS and was driving.  Patient reports patient is able to care for self at this time. Patient indicates a willingness to care for self once medically cleared to do so.    Insurance/Medications    Insured by  Payer/Plan Subscr  Sex Relation Sub. Ins. ID Effective Group Num   1. SIXTO BOATENG* 1995 Female Self [id number] SECUREFULL                                   [address]   2. SIXTO BOATENG* 1995 Female Self [id number] SECUREFULL                                   [address]     Primary Insurance (for UNOS reporting): Public Insurance - Medicare & Choice - [facility]  Secondary Insurance (for UNOS reporting): Public Insurance - Medicaid    Patient reports patient is able to  obtain and afford medications at this time and at time of discharge.    Living Will/Healthcare Power of     Patient states patient does not have a LW and/or HCPA.   provided education regarding LW and HCPA and the completion of forms.    Coping/Mental Health    Patient is coping well with the aid of  family members and friends.  Patient denies mental health difficulties.   educated patient on resources available both inpatient and outpatient. Patient agrees to contact transplant team with needs, questions, or concerns as they arise.     Discharge Planning    At time of discharge, patient plans to return to patient's home under the care of self and/or mother Kiersten Garcia.  Patient will transport herself home upon discharge via personal vehicle.  Per rounds today, expected discharge date has not been medically determined at this time. Patient verbalizes understanding and is involved in treatment planning and discharge process.    Additional Concerns    Patient is being followed for needs, education, resources, information, emotional support, supportive counseling, and for supportive and skilled discharge plan of care.  providing ongoing psychosocial support, education, resources and d/c planning as needed.  SW remains available.  remains available. Patient denies additional needs and/or concerns at this time. Patient verbalizes understanding and agreement with information reviewed, social work availability, and how to access available resources as needed.

## 2023-05-29 NOTE — PROGRESS NOTES
05/29/23 0054 05/29/23 0130 05/29/23 0425   Vital Signs   Temp 100.2 °F (37.9 °C) 99.7 °F (37.6 °C) 99 °F (37.2 °C)   Temp Source Oral Oral Oral     Fall bundle in place. Pt. Remained free from falls/trauma/injuries. No complaints of CP/ SOB/discomfort. VSS. Tele, S.tach. A&Ox4. Pt. denies pain this shift. Pt. Was febrile @ 0054 & was given PRN Tylenol, which was effective & has remained afebrile since. Pt. Remains on continuous NS @ 125. Pt. Due for f/u US per team note. Pts. Total UOP this shift was 1150 cc. The POC reviewed w/ pt., questions were encouraged & answered. No further concerns at this time.

## 2023-05-29 NOTE — ASSESSMENT & PLAN NOTE
"- PMH of ESRD 2/2 diabetic nephropathy I and hypertension s/p kidney and pancreas transplants on 11/3/2022 (Thymo induction, CMV D-/R+)  - Post-op course significant for urinary retention, GEORGE requiring biopsy 12/8 suspicious for ABMR (tx w IVIG 12/14 and 1/26) and e.coli UTI (last admitted end of January w UTI- treated w Cipro EOT 2/5/23)  - admit for urosepsis - see "urinary tract infection"  - kidney US reviewed  - cr wnl, trend daily  - amylase/lipase wnl, trend daily  "

## 2023-05-29 NOTE — ASSESSMENT & PLAN NOTE
- admitted with fever and urinary frequency  - UA with WBC, nitrites  - culture with mult orgs, none in predominance, ask labs to speciate  - repeat urine culture  - on cefepime for h/o e.coli (urine and blood) in January 2023; h/o Diphtheroids and Enterococcus Faecium low counts in dec 2022  - consider adding vanc if spikes again  - blood cultures ngtd

## 2023-05-30 VITALS
WEIGHT: 161.38 LBS | TEMPERATURE: 99 F | SYSTOLIC BLOOD PRESSURE: 119 MMHG | HEIGHT: 64 IN | BODY MASS INDEX: 27.55 KG/M2 | DIASTOLIC BLOOD PRESSURE: 77 MMHG | OXYGEN SATURATION: 100 % | RESPIRATION RATE: 16 BRPM | HEART RATE: 101 BPM

## 2023-05-30 DIAGNOSIS — Z94.0 KIDNEY REPLACED BY TRANSPLANT: Primary | ICD-10-CM

## 2023-05-30 PROBLEM — R00.0 TACHYCARDIA: Status: RESOLVED | Noted: 2022-09-14 | Resolved: 2023-05-30

## 2023-05-30 LAB
ALBUMIN SERPL BCP-MCNC: 3.2 G/DL (ref 3.5–5.2)
AMYLASE SERPL-CCNC: 53 U/L (ref 20–110)
ANION GAP SERPL CALC-SCNC: 10 MMOL/L (ref 8–16)
BACTERIA UR CULT: NO GROWTH
BASOPHILS # BLD AUTO: 0.02 K/UL (ref 0–0.2)
BASOPHILS NFR BLD: 0.3 % (ref 0–1.9)
BUN SERPL-MCNC: 12 MG/DL (ref 6–20)
CALCIUM SERPL-MCNC: 9.8 MG/DL (ref 8.7–10.5)
CHLORIDE SERPL-SCNC: 115 MMOL/L (ref 95–110)
CO2 SERPL-SCNC: 16 MMOL/L (ref 23–29)
CREAT SERPL-MCNC: 0.9 MG/DL (ref 0.5–1.4)
DIFFERENTIAL METHOD: ABNORMAL
EOSINOPHIL # BLD AUTO: 0.1 K/UL (ref 0–0.5)
EOSINOPHIL NFR BLD: 2 % (ref 0–8)
ERYTHROCYTE [DISTWIDTH] IN BLOOD BY AUTOMATED COUNT: 13.1 % (ref 11.5–14.5)
EST. GFR  (NO RACE VARIABLE): >60 ML/MIN/1.73 M^2
GLUCOSE SERPL-MCNC: 95 MG/DL (ref 70–110)
HCT VFR BLD AUTO: 39.5 % (ref 37–48.5)
HGB BLD-MCNC: 12.7 G/DL (ref 12–16)
IMM GRANULOCYTES # BLD AUTO: 0.02 K/UL (ref 0–0.04)
IMM GRANULOCYTES NFR BLD AUTO: 0.3 % (ref 0–0.5)
LIPASE SERPL-CCNC: 11 U/L (ref 4–60)
LYMPHOCYTES # BLD AUTO: 0.8 K/UL (ref 1–4.8)
LYMPHOCYTES NFR BLD: 12.5 % (ref 18–48)
MAGNESIUM SERPL-MCNC: 1.8 MG/DL (ref 1.6–2.6)
MCH RBC QN AUTO: 29.7 PG (ref 27–31)
MCHC RBC AUTO-ENTMCNC: 32.2 G/DL (ref 32–36)
MCV RBC AUTO: 93 FL (ref 82–98)
MONOCYTES # BLD AUTO: 1.2 K/UL (ref 0.3–1)
MONOCYTES NFR BLD: 18.4 % (ref 4–15)
NEUTROPHILS # BLD AUTO: 4.3 K/UL (ref 1.8–7.7)
NEUTROPHILS NFR BLD: 66.5 % (ref 38–73)
NRBC BLD-RTO: 0 /100 WBC
PHOSPHATE SERPL-MCNC: 4.2 MG/DL (ref 2.7–4.5)
PLATELET # BLD AUTO: 235 K/UL (ref 150–450)
PMV BLD AUTO: 10.9 FL (ref 9.2–12.9)
POTASSIUM SERPL-SCNC: 4 MMOL/L (ref 3.5–5.1)
RBC # BLD AUTO: 4.27 M/UL (ref 4–5.4)
SODIUM SERPL-SCNC: 141 MMOL/L (ref 136–145)
TACROLIMUS BLD-MCNC: 14.9 NG/ML (ref 5–15)
WBC # BLD AUTO: 6.48 K/UL (ref 3.9–12.7)

## 2023-05-30 PROCEDURE — 99239 PR HOSPITAL DISCHARGE DAY,>30 MIN: ICD-10-PCS | Mod: ,,, | Performed by: PHYSICIAN ASSISTANT

## 2023-05-30 PROCEDURE — 82150 ASSAY OF AMYLASE: CPT | Performed by: NURSE PRACTITIONER

## 2023-05-30 PROCEDURE — 99239 HOSP IP/OBS DSCHRG MGMT >30: CPT | Mod: ,,, | Performed by: PHYSICIAN ASSISTANT

## 2023-05-30 PROCEDURE — 83735 ASSAY OF MAGNESIUM: CPT | Performed by: NURSE PRACTITIONER

## 2023-05-30 PROCEDURE — 83690 ASSAY OF LIPASE: CPT | Performed by: NURSE PRACTITIONER

## 2023-05-30 PROCEDURE — 25000003 PHARM REV CODE 250: Performed by: PHYSICIAN ASSISTANT

## 2023-05-30 PROCEDURE — 63600175 PHARM REV CODE 636 W HCPCS: Performed by: PHYSICIAN ASSISTANT

## 2023-05-30 PROCEDURE — 85025 COMPLETE CBC W/AUTO DIFF WBC: CPT | Performed by: NURSE PRACTITIONER

## 2023-05-30 PROCEDURE — 63600175 PHARM REV CODE 636 W HCPCS: Performed by: NURSE PRACTITIONER

## 2023-05-30 PROCEDURE — 36415 COLL VENOUS BLD VENIPUNCTURE: CPT | Performed by: NURSE PRACTITIONER

## 2023-05-30 PROCEDURE — 25000003 PHARM REV CODE 250: Performed by: NURSE PRACTITIONER

## 2023-05-30 PROCEDURE — 80197 ASSAY OF TACROLIMUS: CPT | Performed by: NURSE PRACTITIONER

## 2023-05-30 PROCEDURE — 80069 RENAL FUNCTION PANEL: CPT | Performed by: NURSE PRACTITIONER

## 2023-05-30 RX ORDER — CIPROFLOXACIN 500 MG/1
500 TABLET ORAL EVERY 12 HOURS
Qty: 20 TABLET | Refills: 0 | Status: SHIPPED | OUTPATIENT
Start: 2023-05-30 | End: 2023-06-09

## 2023-05-30 RX ORDER — SODIUM BICARBONATE 650 MG/1
1300 TABLET ORAL 3 TIMES DAILY
Status: DISCONTINUED | OUTPATIENT
Start: 2023-05-30 | End: 2023-05-30 | Stop reason: HOSPADM

## 2023-05-30 RX ORDER — TACROLIMUS 1 MG/1
CAPSULE ORAL
Qty: 270 CAPSULE | Refills: 11 | Status: ON HOLD | OUTPATIENT
Start: 2023-05-30 | End: 2023-06-19 | Stop reason: SDUPTHER

## 2023-05-30 RX ORDER — SODIUM BICARBONATE 650 MG/1
1300 TABLET ORAL 3 TIMES DAILY
Qty: 180 TABLET | Refills: 11 | Status: SHIPPED | OUTPATIENT
Start: 2023-05-30 | End: 2024-05-29

## 2023-05-30 RX ADMIN — Medication 2 TABLET: at 08:05

## 2023-05-30 RX ADMIN — MYCOPHENOLATE MOFETIL 500 MG: 250 CAPSULE ORAL at 08:05

## 2023-05-30 RX ADMIN — CEFEPIME 2 G: 2 INJECTION, POWDER, FOR SOLUTION INTRAVENOUS at 08:05

## 2023-05-30 RX ADMIN — ASPIRIN 81 MG: 81 TABLET, COATED ORAL at 08:05

## 2023-05-30 RX ADMIN — PREDNISONE 5 MG: 5 TABLET ORAL at 08:05

## 2023-05-30 RX ADMIN — SODIUM BICARBONATE 1300 MG: 650 TABLET ORAL at 08:05

## 2023-05-30 RX ADMIN — TACROLIMUS 3 MG: 1 CAPSULE ORAL at 08:05

## 2023-05-30 NOTE — PLAN OF CARE
CHW met with patient/family at bedside. Patient experience rounding completed and reviewed the following.     Do you know your discharge plan? Yes        If yes, what is the plan? Home    If you are discharging home, do you have help at home? Yes     Do you think you will need help at home at discharge?No     Have you discussed your needs and preferences with your SW/CM? Yes     Assigned SW/CM notified of any patient/family needs or concerns.

## 2023-05-30 NOTE — DISCHARGE SUMMARY
[facility] - Transplant Stepdown  Kidney Transplant  Discharge Summary    Patient Name: Isabela Read  MRN: [id number]  Admission Date: 5/27/2023  Hospital Length of Stay: 2 days  Discharge Date and Time:  05/30/2023 11:03 AM  Attending Physician: Melani Mcintyre MD   Discharging Provider: Tiara Cervantes PA-C  Primary Care Provider: Tavo Bhatia MD    HPI:   28-year-old female with PMH of ESRD secondary to diabetic nephropathy I and hypertension now s/p kidney and pancreas transplants on 11/3/2022 (Thymo induction, CMV D-/R+). 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  She presents to ER today w complaints of increased urinary frequency and subjective temp- 100.5. She also reports significant chills, nausea, and one episode of non-bloody, bilious vomiting. She denies chest pain, shortness of breath, abdominal pain, dysuria, hematuria, diarrhea, constipation, and black/bloody stools. WBC mildly elevated above patients baseline, sCr 1.1 from BL 0.8, Lipase wnl, UA consistent with infection( w many bacteria and positive nitrites). Blood and urine cultures obtained. Given Cefepime and KTS consulted for admission. Patient admitted with fever, suspect urosepsis/pyelonephritis. Cont Cefepime, plan for kidney US in am, tele ordered and cont IV fluid resuscitation.     Despite IV bolus, pt w tachycardia 130-140, ER recommending CTA r/o PE. Discussed w staff Nephrologist and ok to proceed w CTA as medically warranted. Cont hydration. Will cont to monitor kidney function.      Hospital Course:    Pt admitted for fever and tachycardia. CTA negative for PE. Started on IV cefepime and underwent infectious work up revealing UTI. UA with WBCs, nitrites. Urine culture sent, multiple organisms. Blood cultures ngtd. WBC and fever resolved with IV cefepime.  Repeat urine culture sent and in process. Pt now feeling well. She is stable for discharge in PO cipro, will need to follow up urine culture outpatient to ensure on appropriate antibiotics. Pt has met with pharm D for education. 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 Will refer to urogyn due to multiple UTIs since transplant. Pt expressed understanding of discharge instructions and importance of follow up.     Goals of Care Treatment Preferences:  Code Status: Full Code      Final Active Diagnoses:    Diagnosis Date Noted POA    Hypophosphatemia [E83.39] 2023 Yes    Urinary tract infection with hematuria [N39.0, R31.9] 2023 Yes    Long-term use of immunosuppressant medication [Z79.60] 2022 Not Applicable    Status post -donor simultaneous pancreas kidney transplantation [Z94.0] 2022 Not Applicable    At risk for opportunistic infections [Z91.89] 2022 Yes    Prophylactic immunotherapy [Z29.8] 2022 Not Applicable      Problems Resolved During this Admission:    Diagnosis Date Noted Date Resolved POA    PRINCIPAL PROBLEM:  SIRS (systemic inflammatory response syndrome) [R65.10] 2022 Yes    Urinary retention with incomplete bladder emptying [R33.9] 2022 No    Tachycardia [R00.0] 2022 Yes       Treatments: as above    Consults (From admission, onward)        Status Ordering Provider     Inpatient consult to Kidney Transplant Surgery  Once        Provider:  (Not yet assigned)    Acknowledged [facility]          Pending Diagnostic Studies:     Procedure Component Value Units Date/Time    CMV DNA, quantitative, PCR [[id number]] Collected: 23    Order Status: Sent Lab Status: In process Updated: 23    Specimen: Blood         Significant Diagnostic Studies: Labs:   [facility]   Recent Labs   Lab 23    141   K 4.6 4.0   * 115*   CO2 16* 16*   GLU 97 95    BUN 13 12   CREATININE 0.8 0.9   CALCIUM 9.3 9.8   ALBUMIN 3.1* 3.2*   ANIONGAP 10 10   , CBC   Recent Labs   Lab 05/29/23  0519 05/30/23  0715   WBC 12.78* 6.48   HGB 12.1 12.7   HCT 38.5 39.5    235    and All labs within the past 24 hours have been reviewed    Discharged Condition: good    Disposition:     Follow Up:    Patient Instructions:      Diet Adult Regular     No dressing needed     Notify your health care provider if you experience any of the following:  temperature >100.4     Notify your health care provider if you experience any of the following:  persistent nausea and vomiting or diarrhea     Notify your health care provider if you experience any of the following:  severe uncontrolled pain     Notify your health care provider if you experience any of the following:  redness, tenderness, or signs of infection (pain, swelling, redness, odor or green/yellow discharge around incision site)     Notify your health care provider if you experience any of the following:  difficulty breathing or increased cough     Notify your health care provider if you experience any of the following:  severe persistent headache     Notify your health care provider if you experience any of the following:  worsening rash     Notify your health care provider if you experience any of the following:  persistent dizziness, light-headedness, or visual disturbances     Notify your health care provider if you experience any of the following:  increased confusion or weakness     Notify your health care provider if you experience any of the following:   Order Comments: Any other concerning signs or symptoms. If you have not received your scheduled follow up within 24 hours of your discharge or for any questions or concerns, please call [phone] for further assistance.     Activity as tolerated     Medications:  Reconciled Home Medications:      Medication List      START taking these medications    ciprofloxacin HCl 500  MG tablet  Commonly known as: CIPRO  Take 1 tablet (500 mg total) by mouth every 12 (twelve) hours. Stop 6/9/23 for 10 days        CHANGE how you take these medications    sodium bicarbonate 650 MG tablet  Take 2 tablets (1,300 mg total) by mouth 3 (three) times daily.  What changed: when to take this     tacrolimus 1 MG Cap  Commonly known as: PROGRAF  Take 3 capsules (3 mg total) by mouth every morning AND 3 capsules (3 mg total) every evening.  What changed: See the new instructions.        CONTINUE taking these medications    aspirin 81 MG EC tablet  Commonly known as: ECOTRIN  Take 81 mg by mouth once daily.     ketoconazole 2 % cream  Commonly known as: NIZORAL  Apply topically 2 (two) times daily as needed for dry areas of face     medroxyPROGESTERone 150 mg/mL Syrg  Commonly known as: DEPO-PROVERA  Inject 1 mL (150 mg total) into the muscle once. for 1 dose     multivitamin per tablet  Commonly known as: THERAGRAN  Take 1 tablet by mouth once daily.     mycophenolate 250 mg Cap  Commonly known as: CELLCEPT  Take 2 capsules (500 mg total) by mouth 2 (two) times daily.     ondansetron 8 MG Tbdl  Commonly known as: ZOFRAN-ODT  DISSOLVE 1 tablet (8 mg total) by mouth every 8 (eight) hours as needed (nausea).     predniSONE 5 MG tablet  Commonly known as: DELTASONE  Take 1 tablet (5 mg total) by mouth once daily.     promethazine 12.5 MG Tab  Commonly known as: PHENERGAN  Take 1 tablet (12.5 mg total) by mouth every 6 (six) hours as needed (nausea).     rosuvastatin 10 MG tablet  Commonly known as: CRESTOR  Take 1 tablet (10 mg total) by mouth every evening.     tretinoin 0.05 % cream  Commonly known as: RETIN-A  Apply topically every evening. Start with every other night and move up to nightly after 2 weeks if not too dry.     VITAMIN D2 50,000 unit Cap  Generic drug: ergocalciferol  Take 1 capsule (50,000 Units total) by mouth every 7 days.          Time spent caring for patient (Greater than 1/2 spent in  direct face-to-face contact): > 30 minutes    Tiara Cervantes PA-C  Kidney Transplant  [facility] - Trans[facility]

## 2023-05-30 NOTE — PLAN OF CARE
Pt is AAO4, VSS, NSR/ST on tele, on RA, denies pain.  Has remained afebrile o/n.    Cefepime q8 continued for UTI, urine cx 5/29 in process.  Cr down to 0.8, no IVF infusing this shift.  WBC 12.78.  renal diet.  Voids in hat.  Drawing CMV in AM.  Ambulatory, fall precautions maintained, call bell in reach.

## 2023-05-30 NOTE — PROGRESS NOTES
Discharge Medication Note:    Hospital Course:  admit for UTI. Started on cefepime and discharged with cipro 500 mg BID x 10 days. Ucx grew multiple organisms but recultured and reassess abx treatment. Continue cellcept 500 mg BID given KP and history of rejection. Tac high on day of discharge, so held PM dose and restart in AM (5/31) on 3 mg BID.     Met with Isabela Read at discharge to review discharge medications and to update the blue medication card.           Medication List        START taking these medications      ciprofloxacin HCl 500 MG tablet  Commonly known as: CIPRO  Take 1 tablet (500 mg total) by mouth every 12 (twelve) hours. Stop 6/9/23 for 10 days            CHANGE how you take these medications      sodium bicarbonate 650 MG tablet  Take 2 tablets (1,300 mg total) by mouth 3 (three) times daily.  What changed: when to take this     tacrolimus 1 MG Cap  Commonly known as: PROGRAF  Take 3 capsules (3 mg total) by mouth every morning AND 3 capsules (3 mg total) every evening.  What changed: See the new instructions.            CONTINUE taking these medications      aspirin 81 MG EC tablet  Commonly known as: ECOTRIN     ketoconazole 2 % cream  Commonly known as: NIZORAL  Apply topically 2 (two) times daily as needed for dry areas of face     medroxyPROGESTERone 150 mg/mL Syrg  Commonly known as: DEPO-PROVERA  Inject 1 mL (150 mg total) into the muscle once. for 1 dose     multivitamin per tablet  Commonly known as: THERAGRAN  Take 1 tablet by mouth once daily.     mycophenolate 250 mg Cap  Commonly known as: CELLCEPT  Take 2 capsules (500 mg total) by mouth 2 (two) times daily.     ondansetron 8 MG Tbdl  Commonly known as: ZOFRAN-ODT  DISSOLVE 1 tablet (8 mg total) by mouth every 8 (eight) hours as needed (nausea).     predniSONE 5 MG tablet  Commonly known as: DELTASONE  Take 1 tablet (5 mg total) by mouth once daily.     promethazine 12.5 MG Tab  Commonly known as: PHENERGAN  Take 1  tablet (12.5 mg total) by mouth every 6 (six) hours as needed (nausea).     rosuvastatin 10 MG tablet  Commonly known as: CRESTOR  Take 1 tablet (10 mg total) by mouth every evening.     tretinoin 0.05 % cream  Commonly known as: RETIN-A  Apply topically every evening. Start with every other night and move up to nightly after 2 weeks if not too dry.     VITAMIN D2 50,000 unit Cap  Generic drug: ergocalciferol  Take 1 capsule (50,000 Units total) by mouth every 7 days.               Where to Get Your Medications        These medications were sent to [facility]  [address] [address]      Hours: Mon-Fri 7a-7p, Sat-Sun 10a-4p Phone: [phone]   ciprofloxacin HCl 500 MG tablet  sodium bicarbonate 650 MG tablet  tacrolimus 1 MG Cap            The following medications have been placed on HOLD and should be restarted in the outpatient setting (when appropriate): none     Isabela Read verbalized understanding and had the opportunity to ask questions.

## 2023-05-30 NOTE — PROGRESS NOTES
Discharge instructions given to and reviewed with patient. Reviewed upcoming appointments and when to call MD/coordinator. Patient to  prescription from downstairs pharmacy. Awaiting ride home from mother - patient to walk out when mother arrives.

## 2023-05-30 NOTE — PROGRESS NOTES
Discharge Note:    Pt will discharge to patient's home under the care of self and/or Kiersten Radha, patient's mother, phone number [phone].  Pt aware of, involved in, and coping well with this discharge plan. Pt did not have any concerns with the discharge plan at this time.  No psychosocial needs indicated for discharge at this time.  SW remains available at [phone].

## 2023-05-31 ENCOUNTER — TELEPHONE (OUTPATIENT)
Dept: UROLOGY | Facility: CLINIC | Age: 28
End: 2023-05-31
Payer: MEDICARE

## 2023-05-31 LAB
BACTERIA UR CULT: ABNORMAL
CMV DNA SPEC QL NAA+PROBE: DETECTED
CYTOMEGALOVIRUS LOG (IU/ML): <1.7 LOGIU/ML
CYTOMEGALOVIRUS PCR, QUANT: <50 IU/ML

## 2023-06-02 LAB
BACTERIA BLD CULT: NORMAL
BACTERIA BLD CULT: NORMAL

## 2023-06-05 ENCOUNTER — LAB VISIT (OUTPATIENT)
Dept: LAB | Facility: HOSPITAL | Age: 28
End: 2023-06-05
Payer: MEDICARE

## 2023-06-05 DIAGNOSIS — Z94.0 KIDNEY REPLACED BY TRANSPLANT: ICD-10-CM

## 2023-06-05 DIAGNOSIS — Z94.83 PANCREAS REPLACED BY TRANSPLANT: ICD-10-CM

## 2023-06-05 DIAGNOSIS — Z94.83 STATUS POST PANCREAS TRANSPLANTATION: ICD-10-CM

## 2023-06-05 LAB
ALBUMIN SERPL BCP-MCNC: 3.7 G/DL (ref 3.5–5.2)
AMYLASE SERPL-CCNC: 101 U/L (ref 20–110)
ANION GAP SERPL CALC-SCNC: 6 MMOL/L (ref 8–16)
BASOPHILS # BLD AUTO: 0.03 K/UL (ref 0–0.2)
BASOPHILS NFR BLD: 0.5 % (ref 0–1.9)
BUN SERPL-MCNC: 21 MG/DL (ref 6–20)
CALCIUM SERPL-MCNC: 10 MG/DL (ref 8.7–10.5)
CHLORIDE SERPL-SCNC: 109 MMOL/L (ref 95–110)
CO2 SERPL-SCNC: 22 MMOL/L (ref 23–29)
CREAT SERPL-MCNC: 0.8 MG/DL (ref 0.5–1.4)
DIFFERENTIAL METHOD: ABNORMAL
EOSINOPHIL # BLD AUTO: 0.2 K/UL (ref 0–0.5)
EOSINOPHIL NFR BLD: 3.2 % (ref 0–8)
ERYTHROCYTE [DISTWIDTH] IN BLOOD BY AUTOMATED COUNT: 13.3 % (ref 11.5–14.5)
EST. GFR  (NO RACE VARIABLE): >60 ML/MIN/1.73 M^2
GLUCOSE SERPL-MCNC: 83 MG/DL (ref 70–110)
HCT VFR BLD AUTO: 42.6 % (ref 37–48.5)
HGB BLD-MCNC: 13 G/DL (ref 12–16)
IMM GRANULOCYTES # BLD AUTO: 0.11 K/UL (ref 0–0.04)
IMM GRANULOCYTES NFR BLD AUTO: 1.7 % (ref 0–0.5)
LIPASE SERPL-CCNC: 24 U/L (ref 4–60)
LYMPHOCYTES # BLD AUTO: 1.3 K/UL (ref 1–4.8)
LYMPHOCYTES NFR BLD: 19.4 % (ref 18–48)
MAGNESIUM SERPL-MCNC: 1.5 MG/DL (ref 1.6–2.6)
MCH RBC QN AUTO: 29.3 PG (ref 27–31)
MCHC RBC AUTO-ENTMCNC: 30.5 G/DL (ref 32–36)
MCV RBC AUTO: 96 FL (ref 82–98)
MONOCYTES # BLD AUTO: 0.7 K/UL (ref 0.3–1)
MONOCYTES NFR BLD: 10.5 % (ref 4–15)
NEUTROPHILS # BLD AUTO: 4.3 K/UL (ref 1.8–7.7)
NEUTROPHILS NFR BLD: 64.7 % (ref 38–73)
NRBC BLD-RTO: 0 /100 WBC
PHOSPHATE SERPL-MCNC: 3.7 MG/DL (ref 2.7–4.5)
PLATELET # BLD AUTO: 353 K/UL (ref 150–450)
PMV BLD AUTO: 10.4 FL (ref 9.2–12.9)
POTASSIUM SERPL-SCNC: 4 MMOL/L (ref 3.5–5.1)
RBC # BLD AUTO: 4.43 M/UL (ref 4–5.4)
SODIUM SERPL-SCNC: 137 MMOL/L (ref 136–145)
TACROLIMUS BLD-MCNC: 6.3 NG/ML (ref 5–15)
WBC # BLD AUTO: 6.65 K/UL (ref 3.9–12.7)

## 2023-06-05 PROCEDURE — 82150 ASSAY OF AMYLASE: CPT | Performed by: INTERNAL MEDICINE

## 2023-06-05 PROCEDURE — 83690 ASSAY OF LIPASE: CPT | Performed by: INTERNAL MEDICINE

## 2023-06-05 PROCEDURE — 85025 COMPLETE CBC W/AUTO DIFF WBC: CPT | Performed by: INTERNAL MEDICINE

## 2023-06-05 PROCEDURE — 83735 ASSAY OF MAGNESIUM: CPT | Performed by: INTERNAL MEDICINE

## 2023-06-05 PROCEDURE — 80197 ASSAY OF TACROLIMUS: CPT | Performed by: INTERNAL MEDICINE

## 2023-06-05 PROCEDURE — 36415 COLL VENOUS BLD VENIPUNCTURE: CPT | Performed by: INTERNAL MEDICINE

## 2023-06-05 PROCEDURE — 80069 RENAL FUNCTION PANEL: CPT | Performed by: INTERNAL MEDICINE

## 2023-06-06 NOTE — PHYSICIAN QUERY
"PT Name: Isabela Read  MR #: [id number]     DOCUMENTATION CLARIFICATION     CDS/: Tracy Ortiz RN              Contact information:[email or web address]  This form is a permanent document in the medical record.     Query Date: June 6, 2023    By submitting this query, we are merely seeking further clarification of documentation.  Please utilize your independent clinical judgment when addressing the question(s) below.  The Medical Record contains the following:  Indicators Supporting Clinical Findings Location in Medical Record   x HR         RR          BP        Temp Temp:  [98.5 °F (36.9 °C)-101.3 °F (38.5 °C)] 98.6 °F (37 °C)  Pulse:  [] 102  Resp:  [18] 18  SpO2:  [98 %-100 %] 99 %  BP: (119-142)/(58-77) 142/77 PN 5/29       Transplant   x Lactic Acid          Procalcitonin  05/27/23 23:44   POC Lactate 1.01    Lab   x WBC           Bands          CRP     05/27/23 22:34 05/28/23 03:47 05/29/23 05:19 05/30/23 07:15   WBC 12.39 11.13 12.78 (H) 6.48      Lab           x Culture(s) Staphylococcus epidermidis  10,000 - 49,999 cfu/ml  Enterococcus faecalis  10,000 - 49,999 cfu/ml  Escherichia coli  < 10,000 cfu/ml Culture 5/28    AMS, Confusion, LOC, etc.      Organ Dysfunction/Failure     x Bacteremia or Sepsis / Septic [A41.9, N39.0] Sepsis secondary to UTI      - admit for urosepsis  - cont cefepime  - cont IV fluids  - f/u blood and urine cx      - admit for urosepsis - see "urinary tract infection"  - kidney US reviewed      Urinary tract infection with hematuria  - admitted with fever and urinary frequency  - UA with WBC, nitrites  - culture with mult orgs, none in predominance, ask labs to speciate  - repeat urine culture  - on cefepime for h/o e.coli (urine and blood) in January 2023; h/o Diphtheroids and Enterococcus Faecium low counts in dec 2022  - consider adding vanc if spikes again  - blood cultures ngtd   ED Provider Note 5/27      H&P 5/28        Transplant               PN " 5/29       Transplant      PN 5/29       Transplant   x Known or Suspected Source of Infection documented Sepsis secondary to UTI ED Provider Note 5/27    (Failed) Outpatient Treatment     x Medication WBC and fever resolved with IV cefepime. Repeat urine culture sent and in process. Pt now feeling well. She is stable for discharge in PO cipro, will need to follow up urine culture outpatient to ensure  on appropriate antibiotics. DC summary 5/30       Transplant    Treatment     x Other Problems Resolved During this Admission:    Diagnosis                                      Date Noted Date Resolved POA  PRINCIPAL PROBLEM:    SIRS (systemic inflammatory response syndrome) 1/25/2022 05/29/2023 Yes   DC summary 5/30      Transplant        Due to conflicting clinical picture,  please clarify the Sepsis, urosepsis and SIRS diagnoses associated with above clinical findings.    [  X ] Sepsis due to organism (please specify): _enterococcus UTI_________ ruled in .       [  ] SIRS with infection but without Sepsis       [   ] Other (please specify): __________       [   ] Sepsis Ruled Out           Please document in your progress notes daily for the duration of treatment until resolved and include in your discharge summary.

## 2023-06-06 NOTE — PHYSICIAN QUERY
PT Name: Isabela Read  MR #: [id number]    DOCUMENTATION CLARIFICATION        CDS/: Tracy Ortiz RN            Contact information: [email or web address]  This form is a permanent document in the medical record.     Query Date: 2023    By submitting this query, we are merely seeking further clarification of documentation to reflect the severity of illness of your patient. Please utilize your independent clinical judgment when addressing the question(s) below.    The medical record reflects the following:     Indicators   Supporting Clinical Findings Location in Medical Record   x History of organ transplant  s/p kidney and pancreas transplants on 11/3/2022 H&P        Transplant   x Acute/chronic condition(s) Status post -donor simultaneous pancreas kidney transplantation  - PMH of ESRD 2/2 diabetic nephropathy I and hypertension s/p kidney and pancreas transplants on 11/3/2022 (Thymo induction, CMV D-/R+)  - Post-op course significant for urinary retention, GEORGE requiring biopsy  suspicious for ABMR (tx w IVIG  and ) and e.coli UTI (last admitted end of January w UTI- treated w Cipro EOT 23)  - admit for urosepsis   H&P        Transplant   x Lab Value(s) Urine Culture, Routine       Abnormal   STAPHYLOCOCCUS EPIDERMIDIS   10,000 - 49,999 cfu/ml   VC     Abnormal   ENTEROCOCCUS FAECALIS   10,000 - 49,999 cfu/ml   VC     Abnormal   ESCHERICHIA COLI   < 10,000 cfu/ml          23 22:34 23 03:47 23 05:19 23 07:15   BUN 26 (H) 23 (H) 13 12   Creatinine 1.1 1.0 0.8 0.9   eGFR >60.0 >60.0 >60.0 >60.0        Microbiology                                 Labs   x Radiology/US Findings Stable exam. Persistent mild transplant hydronephrosis with echogenic material in the collecting system.  Recommend correlation with urinalysis   US    x Treatment/Medication - cont cefepime  - cont IV fluids  - f/u blood and urine cx  - Kidney US in  am      Started on cefepime and discharged with cipro 500 mg BID x 10 days. H&P 5/28       Transplant          PN 5/30       Transplant    Other       Provider, please specify if the _____ UTI _______  :    [   ] Affected the function of the transplanted organ     [  X ] Did not affect the function of the transplanted organ     [   ] Other explanation (please specify): _____________         Please document in your progress notes daily for the duration of treatment until resolved, and include in your discharge summary.    Form No.[id number]

## 2023-06-07 LAB
CYTOMEGALOVIRUS LOG (IU/ML): 1.9 LOGIU/ML
CYTOMEGALOVIRUS PCR, QUANT: 79 IU/ML

## 2023-06-08 ENCOUNTER — TELEPHONE (OUTPATIENT)
Dept: TRANSPLANT | Facility: CLINIC | Age: 28
End: 2023-06-08
Payer: MEDICARE

## 2023-06-08 NOTE — TELEPHONE ENCOUNTER
Patient verbalizes understanding of medication change. Next labs are scheduled for 6/12.    ----- Message from Tammie Broussard DO sent at 6/8/2023  2:59 PM CDT -----  CMV positive. Recheck in 1 week  Hold MMF for now. Make a BPA as well for holding antimetabolite

## 2023-06-12 ENCOUNTER — LAB VISIT (OUTPATIENT)
Dept: LAB | Facility: HOSPITAL | Age: 28
End: 2023-06-12
Attending: INTERNAL MEDICINE
Payer: MEDICARE

## 2023-06-12 DIAGNOSIS — Z94.0 KIDNEY REPLACED BY TRANSPLANT: ICD-10-CM

## 2023-06-12 DIAGNOSIS — Z94.83 STATUS POST PANCREAS TRANSPLANTATION: ICD-10-CM

## 2023-06-12 LAB
ALBUMIN SERPL BCP-MCNC: 3.9 G/DL (ref 3.5–5.2)
AMYLASE SERPL-CCNC: 100 U/L (ref 20–110)
ANION GAP SERPL CALC-SCNC: 7 MMOL/L (ref 8–16)
BASOPHILS # BLD AUTO: 0.03 K/UL (ref 0–0.2)
BASOPHILS NFR BLD: 0.7 % (ref 0–1.9)
BUN SERPL-MCNC: 19 MG/DL (ref 6–20)
CALCIUM SERPL-MCNC: 10 MG/DL (ref 8.7–10.5)
CHLORIDE SERPL-SCNC: 110 MMOL/L (ref 95–110)
CO2 SERPL-SCNC: 22 MMOL/L (ref 23–29)
CREAT SERPL-MCNC: 0.8 MG/DL (ref 0.5–1.4)
DIFFERENTIAL METHOD: ABNORMAL
EOSINOPHIL # BLD AUTO: 0.2 K/UL (ref 0–0.5)
EOSINOPHIL NFR BLD: 4.2 % (ref 0–8)
ERYTHROCYTE [DISTWIDTH] IN BLOOD BY AUTOMATED COUNT: 13.4 % (ref 11.5–14.5)
EST. GFR  (NO RACE VARIABLE): >60 ML/MIN/1.73 M^2
GLUCOSE SERPL-MCNC: 85 MG/DL (ref 70–110)
HCT VFR BLD AUTO: 41.4 % (ref 37–48.5)
HGB BLD-MCNC: 13.2 G/DL (ref 12–16)
IMM GRANULOCYTES # BLD AUTO: 0.02 K/UL (ref 0–0.04)
IMM GRANULOCYTES NFR BLD AUTO: 0.5 % (ref 0–0.5)
LIPASE SERPL-CCNC: 20 U/L (ref 4–60)
LYMPHOCYTES # BLD AUTO: 1.1 K/UL (ref 1–4.8)
LYMPHOCYTES NFR BLD: 24.8 % (ref 18–48)
MCH RBC QN AUTO: 30.1 PG (ref 27–31)
MCHC RBC AUTO-ENTMCNC: 31.9 G/DL (ref 32–36)
MCV RBC AUTO: 94 FL (ref 82–98)
MONOCYTES # BLD AUTO: 0.6 K/UL (ref 0.3–1)
MONOCYTES NFR BLD: 13.1 % (ref 4–15)
NEUTROPHILS # BLD AUTO: 2.4 K/UL (ref 1.8–7.7)
NEUTROPHILS NFR BLD: 56.7 % (ref 38–73)
NRBC BLD-RTO: 0 /100 WBC
PHOSPHATE SERPL-MCNC: 2.9 MG/DL (ref 2.7–4.5)
PLATELET # BLD AUTO: 332 K/UL (ref 150–450)
PMV BLD AUTO: 10.5 FL (ref 9.2–12.9)
POTASSIUM SERPL-SCNC: 4.3 MMOL/L (ref 3.5–5.1)
RBC # BLD AUTO: 4.39 M/UL (ref 4–5.4)
SODIUM SERPL-SCNC: 139 MMOL/L (ref 136–145)
TACROLIMUS BLD-MCNC: 7.9 NG/ML (ref 5–15)
WBC # BLD AUTO: 4.28 K/UL (ref 3.9–12.7)

## 2023-06-12 PROCEDURE — 83690 ASSAY OF LIPASE: CPT | Performed by: INTERNAL MEDICINE

## 2023-06-12 PROCEDURE — 80069 RENAL FUNCTION PANEL: CPT | Performed by: INTERNAL MEDICINE

## 2023-06-12 PROCEDURE — 36415 COLL VENOUS BLD VENIPUNCTURE: CPT | Performed by: INTERNAL MEDICINE

## 2023-06-12 PROCEDURE — 82150 ASSAY OF AMYLASE: CPT | Performed by: INTERNAL MEDICINE

## 2023-06-12 PROCEDURE — 80197 ASSAY OF TACROLIMUS: CPT | Performed by: INTERNAL MEDICINE

## 2023-06-12 PROCEDURE — 85025 COMPLETE CBC W/AUTO DIFF WBC: CPT | Performed by: INTERNAL MEDICINE

## 2023-06-13 DIAGNOSIS — M79.641 BILATERAL HAND PAIN: Primary | ICD-10-CM

## 2023-06-13 DIAGNOSIS — M79.642 BILATERAL HAND PAIN: Primary | ICD-10-CM

## 2023-06-14 ENCOUNTER — PATIENT MESSAGE (OUTPATIENT)
Dept: TRANSPLANT | Facility: CLINIC | Age: 28
End: 2023-06-14
Payer: MEDICARE

## 2023-06-14 ENCOUNTER — HOSPITAL ENCOUNTER (OUTPATIENT)
Dept: RADIOLOGY | Facility: OTHER | Age: 28
Discharge: HOME | End: 2023-06-14
Attending: ORTHOPAEDIC SURGERY
Payer: MEDICARE

## 2023-06-14 ENCOUNTER — OFFICE VISIT (OUTPATIENT)
Dept: ORTHOPEDICS | Facility: CLINIC | Age: 28
End: 2023-06-14
Payer: MEDICARE

## 2023-06-14 VITALS — HEIGHT: 64 IN | WEIGHT: 161.38 LBS | BODY MASS INDEX: 27.55 KG/M2

## 2023-06-14 DIAGNOSIS — R20.0 NUMBNESS AND TINGLING OF FOOT: ICD-10-CM

## 2023-06-14 DIAGNOSIS — M79.642 BILATERAL HAND PAIN: ICD-10-CM

## 2023-06-14 DIAGNOSIS — M79.641 BILATERAL HAND PAIN: ICD-10-CM

## 2023-06-14 DIAGNOSIS — G56.03 CARPAL TUNNEL SYNDROME, BILATERAL: Primary | ICD-10-CM

## 2023-06-14 DIAGNOSIS — Z94.0 KIDNEY REPLACED BY TRANSPLANT: Primary | ICD-10-CM

## 2023-06-14 DIAGNOSIS — R20.2 NUMBNESS AND TINGLING IN BOTH HANDS: ICD-10-CM

## 2023-06-14 DIAGNOSIS — R20.2 NUMBNESS AND TINGLING OF FOOT: ICD-10-CM

## 2023-06-14 DIAGNOSIS — R20.0 NUMBNESS AND TINGLING IN BOTH HANDS: ICD-10-CM

## 2023-06-14 DIAGNOSIS — R29.898 WEAKNESS OF LOWER EXTREMITY, UNSPECIFIED LATERALITY: ICD-10-CM

## 2023-06-14 LAB
CMV DNA SPEC QL NAA+PROBE: DETECTED
CYTOMEGALOVIRUS LOG (IU/ML): <1.7 LOGIU/ML
CYTOMEGALOVIRUS PCR, QUANT: <50 IU/ML

## 2023-06-14 PROCEDURE — 1111F PR DISCHARGE MEDS RECONCILED W/ CURRENT OUTPATIENT MED LIST: ICD-10-PCS | Mod: CPTII,S$GLB,, | Performed by: ORTHOPAEDIC SURGERY

## 2023-06-14 PROCEDURE — 73130 X-RAY EXAM OF HAND: CPT | Mod: TC,50,FY

## 2023-06-14 PROCEDURE — 1111F DSCHRG MED/CURRENT MED MERGE: CPT | Mod: CPTII,S$GLB,, | Performed by: ORTHOPAEDIC SURGERY

## 2023-06-14 PROCEDURE — 73130 X-RAY EXAM OF HAND: CPT | Mod: 26,50,, | Performed by: RADIOLOGY

## 2023-06-14 PROCEDURE — 3044F PR MOST RECENT HEMOGLOBIN A1C LEVEL <7.0%: ICD-10-PCS | Mod: CPTII,S$GLB,, | Performed by: ORTHOPAEDIC SURGERY

## 2023-06-14 PROCEDURE — 99999 PR PBB SHADOW E&M-EST. PATIENT-LVL III: ICD-10-PCS | Mod: PBBFAC,,, | Performed by: ORTHOPAEDIC SURGERY

## 2023-06-14 PROCEDURE — 99999 PR PBB SHADOW E&M-EST. PATIENT-LVL III: CPT | Mod: PBBFAC,,, | Performed by: ORTHOPAEDIC SURGERY

## 2023-06-14 PROCEDURE — 99204 OFFICE O/P NEW MOD 45 MIN: CPT | Mod: S$GLB,,, | Performed by: ORTHOPAEDIC SURGERY

## 2023-06-14 PROCEDURE — 73130 XR HAND COMPLETE 3 VIEWS BILATERAL: ICD-10-PCS | Mod: 26,50,, | Performed by: RADIOLOGY

## 2023-06-14 PROCEDURE — 3066F PR DOCUMENTATION OF TREATMENT FOR NEPHROPATHY: ICD-10-PCS | Mod: CPTII,S$GLB,, | Performed by: ORTHOPAEDIC SURGERY

## 2023-06-14 PROCEDURE — 3008F PR BODY MASS INDEX (BMI) DOCUMENTED: ICD-10-PCS | Mod: CPTII,S$GLB,, | Performed by: ORTHOPAEDIC SURGERY

## 2023-06-14 PROCEDURE — 3066F NEPHROPATHY DOC TX: CPT | Mod: CPTII,S$GLB,, | Performed by: ORTHOPAEDIC SURGERY

## 2023-06-14 PROCEDURE — 3008F BODY MASS INDEX DOCD: CPT | Mod: CPTII,S$GLB,, | Performed by: ORTHOPAEDIC SURGERY

## 2023-06-14 PROCEDURE — 99204 PR OFFICE/OUTPT VISIT, NEW, LEVL IV, 45-59 MIN: ICD-10-PCS | Mod: S$GLB,,, | Performed by: ORTHOPAEDIC SURGERY

## 2023-06-14 PROCEDURE — 3044F HG A1C LEVEL LT 7.0%: CPT | Mod: CPTII,S$GLB,, | Performed by: ORTHOPAEDIC SURGERY

## 2023-06-14 RX ORDER — MYCOPHENOLATE MOFETIL 250 MG/1
500 CAPSULE ORAL 2 TIMES DAILY
Qty: 120 CAPSULE | Refills: 11 | Status: SHIPPED | OUTPATIENT
Start: 2023-06-14 | End: 2024-03-12 | Stop reason: DRUGHIGH

## 2023-06-14 NOTE — PROGRESS NOTES
"  [facility]  History & Physical  Orthopedics    SUBJECTIVE:      COVID-19 attestation:  This patient was treated during the COVID-19 pandemic.  This was discussed with the patient, they are aware of our current policies and procedures, were given the option of delaying their visit and or switching to a virtual visit, delaying their surgery when applicable, and they elect to proceed.    Chief Complaint:   Chief Complaint   Patient presents with    bilateral hand     Numbness         Referring Provider: Tavo Koo MD     History of Present Illness:  Patient is a 28 y.o. right hand dominant female who presents today with complaints of numbness and tingling in the hands for 3-4 months.  The numbness is in all of the fingers, but is most prominent in the thumb, index, and long finger on the left hand. The patient reports pain at night and worsening of symptoms. She reports left hand weakness.    She also reports weakness in her lower extremities as well as numbness in the feet.    She underwent a kidney pancreas transplant in 2022.    The patient is an [employer] .    Vitals:    06/14/23 1031   Weight: 73.2 kg (161 lb 6 oz)   Height: 5' 4" (1.626 m)   PainSc: 0-No pain       The patient denies any fevers, chills, N/V, D/C and presents for evaluation.       Past Medical History:   Diagnosis Date    Acute cystitis without hematuria 11/25/2022    Acute kidney injury superimposed on chronic kidney disease 4/7/2021    Anemia     Anemia in ESRD (end-stage renal disease) 7/29/2020    Lab Results  Component Value Date   WBC 7.23 07/29/2020   HGB 7.1 (L) 07/29/2020   HCT 22.2 (L) 07/29/2020   MCV 91 07/29/2020    (H) 07/29/2020   Following with hematology and scheduled to undergo iron infusions    Bacteremia due to Escherichia coli 1/23/2023    Bilious vomiting with nausea 12/12/2022    Chronic hypertension with exacerbation during pregnancy in second trimester 11/6/2020    Current regimen " (11/6/20):  - Carvedilol 12.5 mg BID - Nifedipine 30 mg daily Baseline CKD + proteinuria    Chronic kidney disease     Depression     Diabetes mellitus     Diabetic retinopathy     Diarrhea     Encounter for blood transfusion     End stage renal failure on dialysis     Fever 12/1/2022    Fever of undetermined origin 12/12/2022    Gastroparesis     Glaucoma     Hepatomegaly 4/29/2021    History of diabetes mellitus, type I 12/20/2022    Hx of psychiatric care     Hyperlipidemia     Hypertension     Nausea and vomiting 12/12/2022    Nephrotic syndrome     Nephrotic syndrome 3/4/2021    Nonspecific reaction to tuberculin skin test without active tuberculosis 10/1/2021    Orthostatic hypotension 1/25/2023    Palpitations     Poor fetal growth affecting management of mother in second trimester 10/15/2020    Pyelonephritis, acute 11/26/2022    Restrictive lung disease     Retinal detachment     Sepsis 12/1/2022    Sepsis 11/25/2022    Sepsis due to Escherichia coli 1/23/2023    Severe pre-eclampsia in second trimester 11/6/2020    Severe sepsis 11/25/2022    SIRS (systemic inflammatory response syndrome) 12/6/2022    Status post pancreas transplantation 11/26/2022 11/2/2022    Type 1 diabetes mellitus with end-stage renal disease (ESRD) 2/24/2015    Followed by Dr. Mayo.  Last A1C was 13  Currently on lantus 20 units qAM and humolog 10 units with meals  - a fetal echocardiogram around 22-24 weeks - needs eye exam, podiatry exam  - needs EKG, maternal echo and fu with cardiology  - ASA 81mg, folic acid 4mg PO daily - hemoglobin A1c every 4-6 weeks -24 hour urine for protein and creatinine clearance should be performed. Patient will also need a     Past Surgical History:   Procedure Laterality Date    AV FISTULA PLACEMENT Left 04/07/2021    Procedure: CREATION, AV FISTULA;  Surgeon: Roberto Ryan MD;  Location: [facility] OR [address] [place] [address];  Service: Peripheral Vascular;  Laterality: Left;    COLONOSCOPY N/A 03/16/2022     Procedure: COLONOSCOPY;  Surgeon: Tavo Kwok MD;  Location: [facility] ENDO (4TH FLR);  Service: Endoscopy;  Laterality: N/A;  Questionable history of delayed gastric emptying longstanding diabetes now on eating pre transplant workup for history of nausea vomiting which seems to have improved with dialysis also chronic diarrhea and history of anemia pre transplant workup for kidney transplant. 3    CYSTOSCOPY      ESOPHAGOGASTRODUODENOSCOPY N/A 03/16/2022    Procedure: EGD (ESOPHAGOGASTRODUODENOSCOPY);  Surgeon: Tavo Kwok MD;  Location: [facility] ENDO (4TH FLR);  Service: Endoscopy;  Laterality: N/A;  Questionable history of delayed gastric emptying longstanding diabetes now on eating pre transplant workup for history of nausea vomiting which seems to have improved with dialysis also chronic diarrhea and history of anemia pre transplant workup for    FISTULOGRAM N/A 08/11/2021    Procedure: Fistulogram;  Surgeon: Roberto Ryan MD;  Location: [facility] CATH LAB;  Service: Cardiology;  Laterality: N/A;    KIDNEY TRANSPLANT Right 11/02/2022    Procedure: TRANSPLANT, KIDNEY;  Surgeon: Kumar Rodriges Jr., MD;  Location: [facility] OR 2ND FLR;  Service: Transplant;  Laterality: Right;    LASER PHOTOCOAGULATION OF RETINA Right 05/31/2022    Procedure: PHOTOCOAGULATION, RETINA, USING LASER;  Surgeon: Maximilian Montaño MD;  Location: [facility] OR 1ST FLR;  Service: Ophthalmology;  Laterality: Right;    PERCUTANEOUS TRANSLUMINAL ANGIOPLASTY OF ARTERIOVENOUS FISTULA N/A 08/11/2021    Procedure: PTA, AV FISTULA;  Surgeon: Roberto Ryan MD;  Location: [facility] CATH LAB;  Service: Cardiology;  Laterality: N/A;    REMOVAL IMPLANT, POSTERIOR SEGMENT, INTRAOCULAR Right 02/01/2022    Procedure: REMOVAL IMPLANT, POSTERIOR SEGMENT, INTRAOCULAR;  Surgeon: Maximilian Montaño MD;  Location: [facility] OR 1ST FLR;  Service: Ophthalmology;  Laterality: Right;    REPAIR OF RETINAL DETACHMENT WITH VITRECTOMY Right 01/25/2022    Procedure:  REPAIR, RETINAL DETACHMENT, WITH VITRECTOMY, MEMBRANE PEEL, LASER, INJECTION OF GAS VS OIL;  Surgeon: Maximilian Montaño MD;  Location: [facility] OR 1ST FLR;  Service: Ophthalmology;  Laterality: Right;    REPAIR, RETINAL DETACHMENT, COMPLEX, WITH VITRECTOMY AND MEMBRANE PEELING Right 3/21/2023    Procedure: REPAIR,RETINAL DETACHMENT,COMPLEX,WITH VITRECTOMY AND MEMBRANE PEELING;  Surgeon: Maximilian Montaño MD;  Location: [facility] OR 1ST FLR;  Service: Ophthalmology;  Laterality: Right;  25ga    REVISION OF ARTERIOVENOUS FISTULA Left 12/10/2021    Procedure: REVISION, AV FISTULA with BVT;  Surgeon: Roberto Ryan MD;  Location: [facility] OR 2ND FLR;  Service: Peripheral Vascular;  Laterality: Left;    TRANSPLANTATION OF PANCREAS N/A 11/02/2022    Procedure: TRANSPLANT, PANCREAS;  Surgeon: Kumar Rodirges Jr., MD;  Location: [facility] OR 2ND FLR;  Service: Transplant;  Laterality: N/A;    VITRECTOMY BY PARS PLANA APPROACH Right 02/01/2022    Procedure: VITRECTOMY, PARS PLANA APPROACH;  Surgeon: Maximilian Montaño MD;  Location: [facility] OR 1ST FLR;  Service: Ophthalmology;  Laterality: Right;    VITRECTOMY BY PARS PLANA APPROACH Right 05/31/2022    Procedure: VITRECTOMY, PARS PLANA APPROACH;  Surgeon: Maximilian Montaño MD;  Location: [facility] OR 1ST FLR;  Service: Ophthalmology;  Laterality: Right;     Review of patient's allergies indicates:  No Known Allergies  Social History     Social History Narrative    Caregiver        Single    No kids    Had Miscarriage  At 23 weeks from preeclampsia    Disabled     Family History   Problem Relation Age of Onset    Hypertension Mother     Heart disease Father     Diabetes Brother     Celiac disease Neg Hx     Cirrhosis Neg Hx     Colon cancer Neg Hx     Colon polyps Neg Hx     Crohn's disease Neg Hx     Inflammatory bowel disease Neg Hx     Liver cancer Neg Hx     Liver disease Neg Hx     Rectal cancer Neg Hx     Stomach cancer Neg Hx     Ulcerative colitis Neg Hx     Esophageal  cancer Neg Hx     Hemochromatosis Neg Hx     Pancreatic cancer Neg Hx     Kidney cancer Neg Hx     Bladder Cancer Neg Hx     Uterine cancer Neg Hx     Ovarian cancer Neg Hx          Current Outpatient Medications:     aspirin (ECOTRIN) 81 MG EC tablet, Take 81 mg by mouth once daily., Disp: , Rfl:     ergocalciferol (ERGOCALCIFEROL) 50,000 unit Cap, Take 1 capsule (50,000 Units total) by mouth every 7 days., Disp: 4 capsule, Rfl: 5    ketoconazole (NIZORAL) 2 % cream, Apply topically 2 (two) times daily as needed for dry areas of face, Disp: 60 g, Rfl: 1    medroxyPROGESTERone (DEPO-PROVERA) 150 mg/mL Syrg, Inject 1 mL (150 mg total) into the muscle once. for 1 dose, Disp: 1 mL, Rfl: 3    predniSONE (DELTASONE) 5 MG tablet, Take 1 tablet (5 mg total) by mouth once daily., Disp: 30 tablet, Rfl: 11    promethazine (PHENERGAN) 12.5 MG Tab, Take 1 tablet (12.5 mg total) by mouth every 6 (six) hours as needed (nausea)., Disp: 30 tablet, Rfl: 1    rosuvastatin (CRESTOR) 10 MG tablet, Take 1 tablet (10 mg total) by mouth every evening., Disp: 90 tablet, Rfl: 0    sodium bicarbonate 650 MG tablet, Take 2 tablets (1,300 mg total) by mouth 3 (three) times daily., Disp: 180 tablet, Rfl: 11    tretinoin (RETIN-A) 0.05 % cream, Apply topically every evening. Start with every other night and move up to nightly after 2 weeks if not too dry., Disp: 20 g, Rfl: 1    mycophenolate (CELLCEPT) 250 mg Cap, Take 2 capsules (500 mg total) by mouth 2 (two) times daily., Disp: 120 capsule, Rfl: 11    tacrolimus (PROGRAF) 1 MG Cap, Take 4 capsules (4 mg total) by mouth every 12 (twelve) hours., Disp: 300 capsule, Rfl: 11    ROS    Review of Systems:  Constitutional: no fever or chills  Eyes: no visual changes  ENT: no nasal congestion or sore throat  Respiratory: no cough or shortness of breath  Cardiovascular: no chest pain  Gastrointestinal: no nausea or vomiting, tolerating diet  Musculoskeletal: soreness and  "numbness/tingling    OBJECTIVE:      Vital Signs (Most Recent):  Vitals:    06/14/23 1031   Weight: 73.2 kg (161 lb 6 oz)   Height: 5' 4" (1.626 m)     Body mass index is 27.7 kg/m².    Physical Exam    Physical Exam:  Constitutional: The patient appears well-developed and well-nourished. No distress.   Head: Normocephalic and atraumatic.   Nose: Nose normal.   Eyes: Conjunctivae and EOM are normal.   Neck: No tracheal deviation present.   Cardiovascular: Normal rate and intact distal pulses.    Pulmonary/Chest: Effort normal. No respiratory distress.   Abdominal: There is no guarding.   Lymphatic: Negative for adenopathy   Neurological: The patient is alert.   Psychiatric: The patient has a normal mood and affect.     Cervical Exam:  ROM full, supple  Negative Spurling's  Negative Palm's    Bilateral Hand/Wrist Examination:    Observation/Inspection:  Swelling  none    Deformity  none  Discoloration  none     Scars   none    Atrophy  none    HAND/WRIST EXAMINATION:  Finkelstein's Test   Neg  WHAT Test    Neg  CMC grind    Neg  Circumduction test   Neg    Bilateral ROM hand/wrist/elbow full    Neurovascular Exam:  Digits WWP, brisk CR < 3s throughout  NVI motor/LTS to M/R/U nerves, radial pulse 2+  2+ biceps and brachioradialis reflexes  Tinel's Test - Carpal Tunnel  positive bilaterally  Tinel's Test - Cubital Tunnel  neg  Phalen's Test    positive bilaterally  Median Nerve Compression Test positive bilaterally    Diagnostic Results:      X-rays AP, lateral and oblique bilateral hands taken today are independently reviewed by me and shows no bony or ligamentous abnormalities.      ASSESSMENT/PLAN:      28 y.o. yo female with   Encounter Diagnoses   Name Primary?    Numbness and tingling in both hands     Carpal tunnel syndrome, bilateral Yes    Numbness and tingling of foot     Weakness of lower extremity, unspecified laterality       Plan:  With regards to her bilateral carpal tunnel syndrome, I have given her " a left carpal tunnel brace that she can wear at nighttime.  I have recommended an EMG/NCS to assess the severity of the CTS and possible cervical involvement.    We have discussed the natural history of carpal tunnel syndrome and the possibility of cortisone shots as well.    I will see her after EMG/NCS or for any other questions or problems in the interim as indicated and certainly for any other issues.    The patient's pathophysiology was explained in detail with reference to x-rays, models, other visual aids as appropriate.  Treatment options were discussed in detail.  Questions were invited and answered to the patient's satisfaction. I reviewed Primary care , and other specialty's notes to better coordinate patient's care.        Lola Guerra MD    Please be aware that this note has been generated with the assistance of MModal voice-to-text.  Please excuse any spelling or grammatical errors.

## 2023-06-14 NOTE — TELEPHONE ENCOUNTER
Message sent to patient regarding medication change and plan to repeat CMV PCR in 2 weeks.      ----- Message from Tammie Broussard DO sent at 6/14/2023  2:16 PM CDT -----  Go ahead and restart it  BID  ----- Message -----  From: Renée Green RN  Sent: 6/14/2023   1:39 PM CDT  To: Tammie Broussard DO    No she is holding it for CMV as of 6/8 and was holding it prior to that due to being on antibiotics    ----- Message -----  From: Tammie Broussard DO  Sent: 6/14/2023   1:32 PM CDT  To: [facility]    Recheck in 2 weeks  Has she restarted her MMF?

## 2023-06-17 ENCOUNTER — HOSPITAL ENCOUNTER (OUTPATIENT)
Facility: HOSPITAL | Age: 28
Discharge: HOME | End: 2023-06-19
Attending: EMERGENCY MEDICINE | Admitting: INTERNAL MEDICINE
Payer: MEDICARE

## 2023-06-17 DIAGNOSIS — Z94.83 STATUS POST SIMULTANEOUS KIDNEY AND PANCREAS TRANSPLANT: ICD-10-CM

## 2023-06-17 DIAGNOSIS — Z94.0 STATUS POST DECEASED-DONOR KIDNEY TRANSPLANTATION: ICD-10-CM

## 2023-06-17 DIAGNOSIS — Z94.0 STATUS POST SIMULTANEOUS KIDNEY AND PANCREAS TRANSPLANT: ICD-10-CM

## 2023-06-17 DIAGNOSIS — Z79.60 LONG-TERM USE OF IMMUNOSUPPRESSANT MEDICATION: ICD-10-CM

## 2023-06-17 DIAGNOSIS — H40.211 ACUTE ANGLE-CLOSURE GLAUCOMA OF RIGHT EYE: ICD-10-CM

## 2023-06-17 DIAGNOSIS — R10.32 LLQ ABDOMINAL PAIN: ICD-10-CM

## 2023-06-17 DIAGNOSIS — R53.1 WEAKNESS: ICD-10-CM

## 2023-06-17 DIAGNOSIS — I10 HYPERTENSION, ESSENTIAL: ICD-10-CM

## 2023-06-17 DIAGNOSIS — Z29.89 PROPHYLACTIC IMMUNOTHERAPY: ICD-10-CM

## 2023-06-17 DIAGNOSIS — D72.829 ELEVATED WBC COUNT: ICD-10-CM

## 2023-06-17 DIAGNOSIS — Z91.89 AT RISK FOR OPPORTUNISTIC INFECTIONS: ICD-10-CM

## 2023-06-17 DIAGNOSIS — D72.829 LEUKOCYTOSIS, UNSPECIFIED TYPE: Primary | ICD-10-CM

## 2023-06-17 DIAGNOSIS — Z94.83 PANCREAS REPLACED BY TRANSPLANT: ICD-10-CM

## 2023-06-17 LAB
ALBUMIN SERPL BCP-MCNC: 3.8 G/DL (ref 3.5–5.2)
ALLENS TEST: NORMAL
ALP SERPL-CCNC: 68 U/L (ref 55–135)
ALT SERPL W/O P-5'-P-CCNC: 39 U/L (ref 10–44)
ANION GAP SERPL CALC-SCNC: 14 MMOL/L (ref 8–16)
AST SERPL-CCNC: 24 U/L (ref 10–40)
B-HCG UR QL: NEGATIVE
BACTERIA #/AREA URNS AUTO: ABNORMAL /HPF
BASOPHILS # BLD AUTO: 0.04 K/UL (ref 0–0.2)
BASOPHILS NFR BLD: 0.3 % (ref 0–1.9)
BILIRUB SERPL-MCNC: 0.9 MG/DL (ref 0.1–1)
BILIRUB UR QL STRIP: NEGATIVE
BUN SERPL-MCNC: 39 MG/DL (ref 6–20)
CALCIUM SERPL-MCNC: 9.5 MG/DL (ref 8.7–10.5)
CHLORIDE SERPL-SCNC: 110 MMOL/L (ref 95–110)
CLARITY UR REFRACT.AUTO: ABNORMAL
CO2 SERPL-SCNC: 15 MMOL/L (ref 23–29)
COLOR UR AUTO: YELLOW
CREAT SERPL-MCNC: 1.1 MG/DL (ref 0.5–1.4)
CTP QC/QA: YES
DIFFERENTIAL METHOD: ABNORMAL
EOSINOPHIL # BLD AUTO: 0.1 K/UL (ref 0–0.5)
EOSINOPHIL NFR BLD: 0.5 % (ref 0–8)
ERYTHROCYTE [DISTWIDTH] IN BLOOD BY AUTOMATED COUNT: 14 % (ref 11.5–14.5)
EST. GFR  (NO RACE VARIABLE): >60 ML/MIN/1.73 M^2
GLUCOSE SERPL-MCNC: 108 MG/DL (ref 70–110)
GLUCOSE UR QL STRIP: ABNORMAL
HCT VFR BLD AUTO: 40.4 % (ref 37–48.5)
HCV AB SERPL QL IA: NORMAL
HGB BLD-MCNC: 12.9 G/DL (ref 12–16)
HGB UR QL STRIP: NEGATIVE
HIV 1+2 AB+HIV1 P24 AG SERPL QL IA: NORMAL
HYALINE CASTS UR QL AUTO: 0 /LPF
IMM GRANULOCYTES # BLD AUTO: 0.06 K/UL (ref 0–0.04)
IMM GRANULOCYTES NFR BLD AUTO: 0.4 % (ref 0–0.5)
KETONES UR QL STRIP: NEGATIVE
LDH SERPL L TO P-CCNC: 1.07 MMOL/L (ref 0.5–2.2)
LEUKOCYTE ESTERASE UR QL STRIP: ABNORMAL
LYMPHOCYTES # BLD AUTO: 0.7 K/UL (ref 1–4.8)
LYMPHOCYTES NFR BLD: 5 % (ref 18–48)
MCH RBC QN AUTO: 30 PG (ref 27–31)
MCHC RBC AUTO-ENTMCNC: 31.9 G/DL (ref 32–36)
MCV RBC AUTO: 94 FL (ref 82–98)
MICROSCOPIC COMMENT: ABNORMAL
MONOCYTES # BLD AUTO: 1.6 K/UL (ref 0.3–1)
MONOCYTES NFR BLD: 10.9 % (ref 4–15)
NEUTROPHILS # BLD AUTO: 12.2 K/UL (ref 1.8–7.7)
NEUTROPHILS NFR BLD: 82.9 % (ref 38–73)
NITRITE UR QL STRIP: NEGATIVE
NRBC BLD-RTO: 0 /100 WBC
PH UR STRIP: 6 [PH] (ref 5–8)
PLATELET # BLD AUTO: 299 K/UL (ref 150–450)
PMV BLD AUTO: 10.6 FL (ref 9.2–12.9)
POTASSIUM SERPL-SCNC: 5 MMOL/L (ref 3.5–5.1)
PROT SERPL-MCNC: 7.2 G/DL (ref 6–8.4)
PROT UR QL STRIP: ABNORMAL
RBC # BLD AUTO: 4.3 M/UL (ref 4–5.4)
RBC #/AREA URNS AUTO: 10 /HPF (ref 0–4)
SAMPLE: NORMAL
SITE: NORMAL
SODIUM SERPL-SCNC: 139 MMOL/L (ref 136–145)
SP GR UR STRIP: 1.01 (ref 1–1.03)
SQUAMOUS #/AREA URNS AUTO: 1 /HPF
URN SPEC COLLECT METH UR: ABNORMAL
WBC # BLD AUTO: 14.74 K/UL (ref 3.9–12.7)
WBC #/AREA URNS AUTO: >100 /HPF (ref 0–5)
YEAST UR QL AUTO: ABNORMAL

## 2023-06-17 PROCEDURE — 80053 COMPREHEN METABOLIC PANEL: CPT | Performed by: EMERGENCY MEDICINE

## 2023-06-17 PROCEDURE — 63600175 PHARM REV CODE 636 W HCPCS: Performed by: NURSE PRACTITIONER

## 2023-06-17 PROCEDURE — 93005 ELECTROCARDIOGRAM TRACING: CPT

## 2023-06-17 PROCEDURE — A4216 STERILE WATER/SALINE, 10 ML: HCPCS | Performed by: NURSE PRACTITIONER

## 2023-06-17 PROCEDURE — 99223 1ST HOSP IP/OBS HIGH 75: CPT | Mod: AI,,, | Performed by: NURSE PRACTITIONER

## 2023-06-17 PROCEDURE — 81025 URINE PREGNANCY TEST: CPT | Performed by: EMERGENCY MEDICINE

## 2023-06-17 PROCEDURE — 87389 HIV-1 AG W/HIV-1&-2 AB AG IA: CPT | Performed by: PHYSICIAN ASSISTANT

## 2023-06-17 PROCEDURE — 99285 EMERGENCY DEPT VISIT HI MDM: CPT | Mod: ,,, | Performed by: EMERGENCY MEDICINE

## 2023-06-17 PROCEDURE — 86803 HEPATITIS C AB TEST: CPT | Performed by: PHYSICIAN ASSISTANT

## 2023-06-17 PROCEDURE — 63600175 PHARM REV CODE 636 W HCPCS: Performed by: EMERGENCY MEDICINE

## 2023-06-17 PROCEDURE — 96365 THER/PROPH/DIAG IV INF INIT: CPT

## 2023-06-17 PROCEDURE — 93010 EKG 12-LEAD: ICD-10-PCS | Mod: ,,, | Performed by: INTERNAL MEDICINE

## 2023-06-17 PROCEDURE — 25000003 PHARM REV CODE 250: Performed by: NURSE PRACTITIONER

## 2023-06-17 PROCEDURE — 87086 URINE CULTURE/COLONY COUNT: CPT | Performed by: EMERGENCY MEDICINE

## 2023-06-17 PROCEDURE — 99285 PR EMERGENCY DEPT VISIT,LEVEL V: ICD-10-PCS | Mod: ,,, | Performed by: EMERGENCY MEDICINE

## 2023-06-17 PROCEDURE — 99285 EMERGENCY DEPT VISIT HI MDM: CPT | Mod: 25

## 2023-06-17 PROCEDURE — 99900035 HC TECH TIME PER 15 MIN (STAT)

## 2023-06-17 PROCEDURE — 93010 ELECTROCARDIOGRAM REPORT: CPT | Mod: ,,, | Performed by: INTERNAL MEDICINE

## 2023-06-17 PROCEDURE — G0378 HOSPITAL OBSERVATION PER HR: HCPCS

## 2023-06-17 PROCEDURE — 25000003 PHARM REV CODE 250: Performed by: EMERGENCY MEDICINE

## 2023-06-17 PROCEDURE — 96367 TX/PROPH/DG ADDL SEQ IV INF: CPT

## 2023-06-17 PROCEDURE — 82803 BLOOD GASES ANY COMBINATION: CPT

## 2023-06-17 PROCEDURE — 99223 PR INITIAL HOSPITAL CARE,LEVL III: ICD-10-PCS | Mod: AI,,, | Performed by: NURSE PRACTITIONER

## 2023-06-17 PROCEDURE — 83605 ASSAY OF LACTIC ACID: CPT

## 2023-06-17 PROCEDURE — 85025 COMPLETE CBC W/AUTO DIFF WBC: CPT | Performed by: EMERGENCY MEDICINE

## 2023-06-17 PROCEDURE — 81001 URINALYSIS AUTO W/SCOPE: CPT | Performed by: EMERGENCY MEDICINE

## 2023-06-17 PROCEDURE — 87040 BLOOD CULTURE FOR BACTERIA: CPT | Mod: 59 | Performed by: EMERGENCY MEDICINE

## 2023-06-17 RX ORDER — TACROLIMUS 1 MG/1
3 CAPSULE ORAL 2 TIMES DAILY
Status: DISCONTINUED | OUTPATIENT
Start: 2023-06-17 | End: 2023-06-18

## 2023-06-17 RX ORDER — ASPIRIN 81 MG/1
81 TABLET ORAL DAILY
Status: DISCONTINUED | OUTPATIENT
Start: 2023-06-17 | End: 2023-06-19 | Stop reason: HOSPADM

## 2023-06-17 RX ORDER — SODIUM BICARBONATE 650 MG/1
1300 TABLET ORAL 3 TIMES DAILY
Status: DISCONTINUED | OUTPATIENT
Start: 2023-06-17 | End: 2023-06-19 | Stop reason: HOSPADM

## 2023-06-17 RX ORDER — SODIUM CHLORIDE 0.9 % (FLUSH) 0.9 %
3 SYRINGE (ML) INJECTION EVERY 8 HOURS
Status: DISCONTINUED | OUTPATIENT
Start: 2023-06-17 | End: 2023-06-19 | Stop reason: HOSPADM

## 2023-06-17 RX ORDER — FLUDROCORTISONE ACETATE 0.1 MG/1
100 TABLET ORAL DAILY
Status: DISCONTINUED | OUTPATIENT
Start: 2023-06-17 | End: 2023-06-19

## 2023-06-17 RX ORDER — ERGOCALCIFEROL 1.25 MG/1
50000 CAPSULE ORAL
Status: DISCONTINUED | OUTPATIENT
Start: 2023-06-18 | End: 2023-06-19 | Stop reason: HOSPADM

## 2023-06-17 RX ORDER — ONDANSETRON 2 MG/ML
4 INJECTION INTRAMUSCULAR; INTRAVENOUS EVERY 12 HOURS PRN
Status: DISCONTINUED | OUTPATIENT
Start: 2023-06-17 | End: 2023-06-19 | Stop reason: HOSPADM

## 2023-06-17 RX ORDER — ERGOCALCIFEROL 1.25 MG/1
50000 CAPSULE ORAL
Status: DISCONTINUED | OUTPATIENT
Start: 2023-06-17 | End: 2023-06-17

## 2023-06-17 RX ORDER — PREDNISONE 5 MG/1
5 TABLET ORAL DAILY
Status: DISCONTINUED | OUTPATIENT
Start: 2023-06-17 | End: 2023-06-19 | Stop reason: HOSPADM

## 2023-06-17 RX ORDER — ONDANSETRON 8 MG/1
8 TABLET, ORALLY DISINTEGRATING ORAL EVERY 8 HOURS PRN
Status: DISCONTINUED | OUTPATIENT
Start: 2023-06-17 | End: 2023-06-19 | Stop reason: HOSPADM

## 2023-06-17 RX ORDER — TRAMADOL HYDROCHLORIDE 50 MG/1
50 TABLET ORAL EVERY 6 HOURS PRN
Status: DISCONTINUED | OUTPATIENT
Start: 2023-06-17 | End: 2023-06-19 | Stop reason: HOSPADM

## 2023-06-17 RX ORDER — ACETAMINOPHEN 325 MG/1
650 TABLET ORAL EVERY 6 HOURS PRN
Status: DISCONTINUED | OUTPATIENT
Start: 2023-06-17 | End: 2023-06-19 | Stop reason: HOSPADM

## 2023-06-17 RX ADMIN — PREDNISONE 5 MG: 5 TABLET ORAL at 01:06

## 2023-06-17 RX ADMIN — FLUDROCORTISONE ACETATE 100 MCG: 0.1 TABLET ORAL at 02:06

## 2023-06-17 RX ADMIN — Medication 3 ML: at 10:06

## 2023-06-17 RX ADMIN — DEXTROSE MONOHYDRATE 1 G: 5 INJECTION INTRAVENOUS at 06:06

## 2023-06-17 RX ADMIN — Medication 3 ML: at 02:06

## 2023-06-17 RX ADMIN — TACROLIMUS 3 MG: 1 CAPSULE ORAL at 05:06

## 2023-06-17 RX ADMIN — CEFEPIME 2 G: 2 INJECTION, POWDER, FOR SOLUTION INTRAVENOUS at 04:06

## 2023-06-17 RX ADMIN — ASPIRIN 81 MG: 81 TABLET, COATED ORAL at 01:06

## 2023-06-17 RX ADMIN — SODIUM CHLORIDE, POTASSIUM CHLORIDE, SODIUM LACTATE AND CALCIUM CHLORIDE 2109 ML: 600; 310; 30; 20 INJECTION, SOLUTION INTRAVENOUS at 06:06

## 2023-06-17 RX ADMIN — SODIUM BICARBONATE 1300 MG: 650 TABLET ORAL at 02:06

## 2023-06-17 RX ADMIN — SODIUM BICARBONATE 1300 MG: 650 TABLET ORAL at 07:06

## 2023-06-17 NOTE — SUBJECTIVE & OBJECTIVE
Subjective:     Chief Complaint/Reason for Admission: Elevated WBC, S/P Kidney/Pancrease Transplant    History of Present Illness:  28-year-old female with PMH of ESRD secondary to diabetic nephropathy I and hypertension now s/p kidney and pancreas transplants on 11/3/2022 (Thymo induction, CMV D-/R+). 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  Recently admitted for UTI Tachycardia. Discharged on oral cipro. Today patient presents to ED with LLQ pain/tenderness, lower grade fever 99.2 (reports had taken tylenol prior to arrival to ED).  Blood cultures obtained in ED and given Ceftriaxone IV x1.  Noted on labs WBC 14.7.  Kidney U/S ordered. She denies  headache, chest pain, vomiting,  symptoms.  Patient discussed with Dr Stern will admit.        Review of patient's allergies indicates:  No Known Allergies    Past Medical History:   Diagnosis Date    Acute cystitis without hematuria 11/25/2022    Acute kidney injury superimposed on chronic kidney disease 4/7/2021    Anemia     Anemia in ESRD (end-stage renal disease) 7/29/2020    Lab Results  Component Value Date   WBC 7.23 07/29/2020   HGB 7.1 (L) 07/29/2020   HCT 22.2 (L) 07/29/2020   MCV 91 07/29/2020    (H) 07/29/2020   Following with hematology and scheduled to undergo iron infusions    Bacteremia due to Escherichia coli 1/23/2023    Bilious vomiting with nausea 12/12/2022    Chronic hypertension with exacerbation during pregnancy in second trimester 11/6/2020    Current regimen (11/6/20):  - Carvedilol 12.5 mg BID - Nifedipine 30 mg daily Baseline CKD + proteinuria    Chronic kidney disease     Depression     Diabetes mellitus     Diabetic retinopathy     Diarrhea     Encounter for blood transfusion     End stage renal failure on dialysis     Fever 12/1/2022    Fever of undetermined origin  12/12/2022    Gastroparesis     Glaucoma     Hepatomegaly 4/29/2021    History of diabetes mellitus, type I 12/20/2022    Hx of psychiatric care     Hyperlipidemia     Hypertension     Nausea and vomiting 12/12/2022    Nephrotic syndrome     Nephrotic syndrome 3/4/2021    Nonspecific reaction to tuberculin skin test without active tuberculosis 10/1/2021    Orthostatic hypotension 1/25/2023    Palpitations     Poor fetal growth affecting management of mother in second trimester 10/15/2020    Pyelonephritis, acute 11/26/2022    Restrictive lung disease     Retinal detachment     Sepsis 12/1/2022    Sepsis 11/25/2022    Sepsis due to Escherichia coli 1/23/2023    Severe pre-eclampsia in second trimester 11/6/2020    Severe sepsis 11/25/2022    SIRS (systemic inflammatory response syndrome) 12/6/2022    Status post pancreas transplantation 11/26/2022 11/2/2022    Type 1 diabetes mellitus with end-stage renal disease (ESRD) 2/24/2015    Followed by Dr. Mayo.  Last A1C was 13  Currently on lantus 20 units qAM and humolog 10 units with meals  - a fetal echocardiogram around 22-24 weeks - needs eye exam, podiatry exam  - needs EKG, maternal echo and fu with cardiology  - ASA 81mg, folic acid 4mg PO daily - hemoglobin A1c every 4-6 weeks -24 hour urine for protein and creatinine clearance should be performed. Patient will also need a     Past Surgical History:   Procedure Laterality Date    AV FISTULA PLACEMENT Left 04/07/2021    Procedure: CREATION, AV FISTULA;  Surgeon: Roberto Ryan MD;  Location: [facility] OR [address] [place] [address];  Service: Peripheral Vascular;  Laterality: Left;    COLONOSCOPY N/A 03/16/2022    Procedure: COLONOSCOPY;  Surgeon: Tavo Kwok MD;  Location: [facility] (4TH FLR);  Service: Endoscopy;  Laterality: N/A;  Questionable history of delayed gastric emptying longstanding diabetes now on eating pre transplant workup for history of nausea vomiting which seems to have improved with dialysis also  chronic diarrhea and history of anemia pre transplant workup for kidney transplant. 3    CYSTOSCOPY      ESOPHAGOGASTRODUODENOSCOPY N/A 03/16/2022    Procedure: EGD (ESOPHAGOGASTRODUODENOSCOPY);  Surgeon: Tavo Kwok MD;  Location: [facility] (4TH FLR);  Service: Endoscopy;  Laterality: N/A;  Questionable history of delayed gastric emptying longstanding diabetes now on eating pre transplant workup for history of nausea vomiting which seems to have improved with dialysis also chronic diarrhea and history of anemia pre transplant workup for    FISTULOGRAM N/A 08/11/2021    Procedure: Fistulogram;  Surgeon: Roberto Ryan MD;  Location: [facility] CATH LAB;  Service: Cardiology;  Laterality: N/A;    KIDNEY TRANSPLANT Right 11/02/2022    Procedure: TRANSPLANT, KIDNEY;  Surgeon: Kumar Rodriges Jr., MD;  Location: [facility] OR 2ND FLR;  Service: Transplant;  Laterality: Right;    LASER PHOTOCOAGULATION OF RETINA Right 05/31/2022    Procedure: PHOTOCOAGULATION, RETINA, USING LASER;  Surgeon: Maximilian Montaño MD;  Location: [facility] OR 1ST FLR;  Service: Ophthalmology;  Laterality: Right;    PERCUTANEOUS TRANSLUMINAL ANGIOPLASTY OF ARTERIOVENOUS FISTULA N/A 08/11/2021    Procedure: PTA, AV FISTULA;  Surgeon: Roberto Ryan MD;  Location: [facility] CATH LAB;  Service: Cardiology;  Laterality: N/A;    REMOVAL IMPLANT, POSTERIOR SEGMENT, INTRAOCULAR Right 02/01/2022    Procedure: REMOVAL IMPLANT, POSTERIOR SEGMENT, INTRAOCULAR;  Surgeon: Maximilian Montaño MD;  Location: [facility] OR 1ST FLR;  Service: Ophthalmology;  Laterality: Right;    REPAIR OF RETINAL DETACHMENT WITH VITRECTOMY Right 01/25/2022    Procedure: REPAIR, RETINAL DETACHMENT, WITH VITRECTOMY, MEMBRANE PEEL, LASER, INJECTION OF GAS VS OIL;  Surgeon: Maximilian Montaño MD;  Location: [facility] OR 1ST FLR;  Service: Ophthalmology;  Laterality: Right;    REPAIR, RETINAL DETACHMENT, COMPLEX, WITH VITRECTOMY AND MEMBRANE PEELING Right 3/21/2023    Procedure:  REPAIR,RETINAL DETACHMENT,COMPLEX,WITH VITRECTOMY AND MEMBRANE PEELING;  Surgeon: Maximilian Montaño MD;  Location: [facility] OR 1ST FLR;  Service: Ophthalmology;  Laterality: Right;  25ga    REVISION OF ARTERIOVENOUS FISTULA Left 12/10/2021    Procedure: REVISION, AV FISTULA with BVT;  Surgeon: Roberto Ryan MD;  Location: [facility] OR 2ND FLR;  Service: Peripheral Vascular;  Laterality: Left;    TRANSPLANTATION OF PANCREAS N/A 11/02/2022    Procedure: TRANSPLANT, PANCREAS;  Surgeon: Kumar Rodriges Jr., MD;  Location: [facility] OR 2ND FLR;  Service: Transplant;  Laterality: N/A;    VITRECTOMY BY PARS PLANA APPROACH Right 02/01/2022    Procedure: VITRECTOMY, PARS PLANA APPROACH;  Surgeon: Maximilian Montaño MD;  [facility];  Service: Ophthalmology;  Laterality: Right;    VITRECTOMY BY PARS PLANA APPROACH Right 05/31/2022    Procedure: VITRECTOMY, PARS PLANA APPROACH;  Surgeon: Maximilian Montaño MD;  [facility];  Service: Ophthalmology;  Laterality: Right;     Family History       Problem Relation (Age of Onset)    Diabetes Brother    Heart disease Father    Hypertension Mother          Tobacco Use    Smoking status: Never    Smokeless tobacco: Never   Substance and Sexual Activity    Alcohol use: No    Drug use: No    Sexual activity: Not Currently     Partners: Male     Comment: monogamous        Review of Systems   Constitutional:  Negative for activity change and appetite change.   HENT: Negative.     Eyes:  Negative for visual disturbance.   Respiratory:  Negative for shortness of breath.    Cardiovascular:  Negative for chest pain, palpitations and leg swelling.   Gastrointestinal:  Positive for abdominal pain. Negative for abdominal distention, diarrhea, nausea and vomiting.        LLQ tenderness    Genitourinary:  Negative for difficulty urinating.   Musculoskeletal:  Negative for arthralgias, back pain and joint swelling.   Skin:  Negative for wound.   Allergic/Immunologic:  Positive for immunocompromised state.   Neurological:  Negative for dizziness and facial asymmetry.   Hematological:  Negative for adenopathy. Does not bruise/bleed easily.   Psychiatric/Behavioral:  Negative for agitation and behavioral problems.    All other systems reviewed and are negative.  Objective:     Vital Signs (Most Recent):  Temp: 98.2 °F (36.8 °C) (06/17/23 0717)  Pulse: 109 (06/17/23 0833)  Resp: 18 (06/17/23 0833)  BP: 135/71 (06/17/23 0833)  SpO2: 99 % (06/17/23 0833)     Weight: 70.3 kg (155 lb)  Body mass index is 26.61 kg/m².      Physical Exam  Vitals and nursing note reviewed.   Constitutional:       Appearance: She is well-developed.   HENT:      Head: Normocephalic.   Eyes:      Conjunctiva/sclera: Conjunctivae normal.   Cardiovascular:      Rate and Rhythm: Normal rate and regular rhythm.      Heart sounds: No murmur heard.  Pulmonary:      Effort: Pulmonary effort is normal.      Breath sounds: Normal breath sounds.   Abdominal:      General: Bowel sounds are normal. There is no distension.      Palpations: Abdomen is soft.      Tenderness: There is no abdominal tenderness.       Musculoskeletal:         General: Normal range of motion.      Cervical back: Normal range of motion.   Skin:     General: Skin is warm and dry.      Capillary Refill: Capillary refill takes less than 2 seconds.   Neurological:      Mental Status: She is alert and oriented to person, place, and time.   Psychiatric:         Behavior: Behavior normal.         Thought Content: Thought content normal.         Judgment: Judgment normal.        Laboratory  CBC:   Recent Labs   Lab 06/12/23 0826 06/17/23 0617   WBC 4.28 14.74*   RBC 4.39 4.30   HGB 13.2 12.9   HCT 41.4 40.4    299   MCV 94 94   MCH 30.1 30.0   MCHC 31.9* 31.9*     BMP:   Recent Labs   Lab 06/12/23 0826 06/17/23 0617   GLU 85 108    139   K 4.3 5.0    110   CO2 22* 15*   BUN 19 39*   CREATININE 0.8 1.1   CALCIUM 10.0 9.5     CMP:    Recent Labs   Lab 06/12/23  0826 06/17/23  0617   GLU 85 108   CALCIUM 10.0 9.5   ALBUMIN 3.9 3.8   PROT  --  7.2    139   K 4.3 5.0   CO2 22* 15*    110   BUN 19 39*   CREATININE 0.8 1.1   ALKPHOS  --  68   ALT  --  39   AST  --  24     Labs within the past 24 hours have been reviewed.    Diagnostic Results:  US - Kidney: Results for orders placed during the hospital encounter of 05/27/23    US Transplant Kidney With Doppler    Narrative  EXAMINATION:  US TRANSPLANT KIDNEY WITH DOPPLER    CLINICAL HISTORY:  s/p kidney pancreas transplant w recurrent UTI;    TECHNIQUE:  Real time gray scale and doppler ultrasound was performed over the patient's renal allograft.    COMPARISON:  U/S transplant kidney with Doppler 01/22/2023, 12/11/2022; CT abdomen pelvis 01/21/2023    FINDINGS:  Patient status-post renal transplant 11/02/2022.  The transplant lies in the right lower quadrant and measures 13.1 cm.    The renal transplant demonstrates no focal parenchymal abnormality. Mild transplant hydronephrosis with echogenic material in the collecting system.  No peritransplant fluid.    Renal arterial resistive indices are as follows: Interlobar 0.72, segmental Up 0.77, segmental Mid 0.76, segmental Low 0.73.  Renal arterial resistive indices previously ranged from 0.75 to 0.79.  There is no evidence of a tardus parvus waveform. The maximum velocity in the main renal artery is 278 cm/sec (previously 244 cm/sec).  The maximum velocity in the iliac artery measures 270 cm/sec.  The RA/iliac ratio measures 1.0.    The renal transplant venous system is unremarkable.    Impression  Stable exam. Persistent mild transplant hydronephrosis with echogenic material in the collecting system.  Recommend correlation with urinalysis.    Satisfactory Doppler evaluation of the allograft.    Electronically signed by resident: Jomar Norman  Date:    05/28/2023  Time:    11:49    Electronically signed by: Uche  Deepti  Date:    05/28/2023  Time:    11:58    Patient was SARS-CoV-2 /COVID-19 tested with negative results.

## 2023-06-17 NOTE — ED NOTES
Patient encouraged to provide urine specimen. States she cannot urinate at this time.  Will reassess shortly. Call bell in reach

## 2023-06-17 NOTE — ED PROVIDER NOTES
Encounter Date: 6/17/2023       History     Chief Complaint   Patient presents with    Abdominal Pain     Lower abd pain with N/V and subjective fever since last night. Reports this feels similar to her previous uti's. Hx kidney transplant 11/2022     27 y/o female 7 months s/p kidney transplant presents with 6 hours of lower abdominal pressure, urinary frequenct, and feeling weak.  Had fever to 99.2 at home.  Recently admitted for UTI.  No headache, chest pain, vomiting,  symptoms    Review of patient's allergies indicates:  No Known Allergies  Past Medical History:   Diagnosis Date    Acute cystitis without hematuria 11/25/2022    Acute kidney injury superimposed on chronic kidney disease 4/7/2021    Anemia     Anemia in ESRD (end-stage renal disease) 7/29/2020    Lab Results  Component Value Date   WBC 7.23 07/29/2020   HGB 7.1 (L) 07/29/2020   HCT 22.2 (L) 07/29/2020   MCV 91 07/29/2020    (H) 07/29/2020   Following with hematology and scheduled to undergo iron infusions    Bacteremia due to Escherichia coli 1/23/2023    Bilious vomiting with nausea 12/12/2022    Chronic hypertension with exacerbation during pregnancy in second trimester 11/6/2020    Current regimen (11/6/20):  - Carvedilol 12.5 mg BID - Nifedipine 30 mg daily Baseline CKD + proteinuria    Chronic kidney disease     Depression     Diabetes mellitus     Diabetic retinopathy     Diarrhea     Encounter for blood transfusion     End stage renal failure on dialysis     Fever 12/1/2022    Fever of undetermined origin 12/12/2022    Gastroparesis     Glaucoma     Hepatomegaly 4/29/2021    History of diabetes mellitus, type I 12/20/2022    Hx of psychiatric care     Hyperlipidemia     Hypertension     Nausea and vomiting 12/12/2022    Nephrotic syndrome     Nephrotic syndrome 3/4/2021    Nonspecific reaction to tuberculin skin test without active tuberculosis 10/1/2021    Orthostatic hypotension 1/25/2023    Palpitations     Poor fetal growth  affecting management of mother in second trimester 10/15/2020    Pyelonephritis, acute 11/26/2022    Restrictive lung disease     Retinal detachment     Sepsis 12/1/2022    Sepsis 11/25/2022    Sepsis due to Escherichia coli 1/23/2023    Severe pre-eclampsia in second trimester 11/6/2020    Severe sepsis 11/25/2022    SIRS (systemic inflammatory response syndrome) 12/6/2022    Status post pancreas transplantation 11/26/2022 11/2/2022    Type 1 diabetes mellitus with end-stage renal disease (ESRD) 2/24/2015    Followed by Dr. Mayo.  Last A1C was 13  Currently on lantus 20 units qAM and humolog 10 units with meals  - a fetal echocardiogram around 22-24 weeks - needs eye exam, podiatry exam  - needs EKG, maternal echo and fu with cardiology  - ASA 81mg, folic acid 4mg PO daily - hemoglobin A1c every 4-6 weeks -24 hour urine for protein and creatinine clearance should be performed. Patient will also need a     Past Surgical History:   Procedure Laterality Date    AV FISTULA PLACEMENT Left 04/07/2021    Procedure: CREATION, AV FISTULA;  Surgeon: Roberto Ryan MD;  Location: [facility] OR [address] [place] [address];  Service: Peripheral Vascular;  Laterality: Left;    COLONOSCOPY N/A 03/16/2022    Procedure: COLONOSCOPY;  Surgeon: Tavo Kwok MD;  [facility]  Service: Endoscopy;  Laterality: N/A;  Questionable history of delayed gastric emptying longstanding diabetes now on eating pre transplant workup for history of nausea vomiting which seems to have improved with dialysis also chronic diarrhea and history of anemia pre transplant workup for kidney transplant. 3    CYSTOSCOPY      ESOPHAGOGASTRODUODENOSCOPY N/A 03/16/2022    Procedure: EGD (ESOPHAGOGASTRODUODENOSCOPY);  Surgeon: Tavo Kwok MD;  Location: [facility] ENDO (4TH FLR);  Service: Endoscopy;  Laterality: N/A;  Questionable history of delayed gastric emptying longstanding diabetes now on eating pre transplant workup for history of nausea  vomiting which seems to have improved with dialysis also chronic diarrhea and history of anemia pre transplant workup for    FISTULOGRAM N/A 08/11/2021    Procedure: Fistulogram;  Surgeon: Roberto Ryan MD;  Location: [facility] CATH LAB;  Service: Cardiology;  Laterality: N/A;    KIDNEY TRANSPLANT Right 11/02/2022    Procedure: TRANSPLANT, KIDNEY;  Surgeon: Kumar Rodriges Jr., MD;  Location: [facility] OR 2ND FLR;  Service: Transplant;  Laterality: Right;    LASER PHOTOCOAGULATION OF RETINA Right 05/31/2022    Procedure: PHOTOCOAGULATION, RETINA, USING LASER;  Surgeon: Maximilian Montaño MD;  Location: [facility] OR 1ST FLR;  Service: Ophthalmology;  Laterality: Right;    PERCUTANEOUS TRANSLUMINAL ANGIOPLASTY OF ARTERIOVENOUS FISTULA N/A 08/11/2021    Procedure: PTA, AV FISTULA;  Surgeon: Roberto Ryan MD;  Location: [facility] CATH LAB;  Service: Cardiology;  Laterality: N/A;    REMOVAL IMPLANT, POSTERIOR SEGMENT, INTRAOCULAR Right 02/01/2022    Procedure: REMOVAL IMPLANT, POSTERIOR SEGMENT, INTRAOCULAR;  Surgeon: Maximilian Montaño MD;  Location: [facility] OR 1ST FLR;  Service: Ophthalmology;  Laterality: Right;    REPAIR OF RETINAL DETACHMENT WITH VITRECTOMY Right 01/25/2022    Procedure: REPAIR, RETINAL DETACHMENT, WITH VITRECTOMY, MEMBRANE PEEL, LASER, INJECTION OF GAS VS OIL;  Surgeon: Maximilian Montaño MD;  Location: [facility] OR 1ST FLR;  Service: Ophthalmology;  Laterality: Right;    REPAIR, RETINAL DETACHMENT, COMPLEX, WITH VITRECTOMY AND MEMBRANE PEELING Right 3/21/2023    Procedure: REPAIR,RETINAL DETACHMENT,COMPLEX,WITH VITRECTOMY AND MEMBRANE PEELING;  Surgeon: Maximilian Montaño MD;  Location: [facility] OR 1ST FLR;  Service: Ophthalmology;  Laterality: Right;  25ga    REVISION OF ARTERIOVENOUS FISTULA Left 12/10/2021    Procedure: REVISION, AV FISTULA with BVT;  Surgeon: Roberto Ryan MD;  Location: [facility] OR 2ND FLR;  Service: Peripheral Vascular;  Laterality: Left;    TRANSPLANTATION OF PANCREAS N/A  11/02/2022    Procedure: TRANSPLANT, PANCREAS;  Surgeon: Kumar Rodriges Jr., MD;  Location: [facility] OR 2ND FLR;  Service: Transplant;  Laterality: N/A;    VITRECTOMY BY PARS PLANA APPROACH Right 02/01/2022    Procedure: VITRECTOMY, PARS PLANA APPROACH;  Surgeon: Maximilian Montaño MD;  Location: [facility] OR 1ST FLR;  Service: Ophthalmology;  Laterality: Right;    VITRECTOMY BY PARS PLANA APPROACH Right 05/31/2022    Procedure: VITRECTOMY, PARS PLANA APPROACH;  Surgeon: Maximilian Montaño MD;  Location: [facility] OR 1ST FLR;  Service: Ophthalmology;  Laterality: Right;     Family History   Problem Relation Age of Onset    Hypertension Mother     Heart disease Father     Diabetes Brother     Celiac disease Neg Hx     Cirrhosis Neg Hx     Colon cancer Neg Hx     Colon polyps Neg Hx     Crohn's disease Neg Hx     Inflammatory bowel disease Neg Hx     Liver cancer Neg Hx     Liver disease Neg Hx     Rectal cancer Neg Hx     Stomach cancer Neg Hx     Ulcerative colitis Neg Hx     Esophageal cancer Neg Hx     Hemochromatosis Neg Hx     Pancreatic cancer Neg Hx     Kidney cancer Neg Hx     Bladder Cancer Neg Hx     Uterine cancer Neg Hx     Ovarian cancer Neg Hx      Social History     Tobacco Use    Smoking status: Never    Smokeless tobacco: Never   Substance Use Topics    Alcohol use: No    Drug use: No     Review of Systems    Physical Exam     Initial Vitals [06/17/23 0559]   BP Pulse Resp Temp SpO2   (!) 88/54 (!) 115 20 98.3 °F (36.8 °C) 98 %      MAP       --         Physical Exam    Constitutional: She appears well-developed and well-nourished.   Appears fatigued   HENT:   Head: Normocephalic and atraumatic.   Eyes: Conjunctivae are normal. Pupils are equal, round, and reactive to light.   Neck: Neck supple. No JVD present.   Normal range of motion.  Cardiovascular:  Normal rate, regular rhythm and normal heart sounds.           No murmur heard.  Pulmonary/Chest: Breath sounds normal. No respiratory distress. She has no  wheezes. She has no rales.   Abdominal: Abdomen is soft. Bowel sounds are normal. She exhibits no distension. There is no abdominal tenderness. There is no rebound.   Musculoskeletal:         General: No edema. Normal range of motion.      Cervical back: Normal range of motion and neck supple.     Neurological: She is alert. She has normal strength. No cranial nerve deficit or sensory deficit.   Psychiatric: She has a normal mood and affect.       ED Course   Procedures  Labs Reviewed   CBC W/ AUTO DIFFERENTIAL - Abnormal; Notable for the following components:       Result Value    WBC 14.74 (*)     MCHC 31.9 (*)     Gran # (ANC) 12.2 (*)     Immature Grans (Abs) 0.06 (*)     Lymph # 0.7 (*)     Mono # 1.6 (*)     Gran % 82.9 (*)     Lymph % 5.0 (*)     All other components within normal limits   COMPREHENSIVE METABOLIC PANEL - Abnormal; Notable for the following components:    CO2 15 (*)     BUN 39 (*)     All other components within normal limits   URINALYSIS, REFLEX TO URINE CULTURE - Abnormal; Notable for the following components:    Appearance, UA Hazy (*)     Protein, UA 3+ (*)     Glucose, UA 1+ (*)     Leukocytes, UA 2+ (*)     All other components within normal limits    Narrative:     Specimen Source->Urine   URINALYSIS MICROSCOPIC - Abnormal; Notable for the following components:    RBC, UA 10 (*)     WBC, UA >100 (*)     Yeast, UA Few (*)     All other components within normal limits    Narrative:     Specimen Source->Urine   POCT URINE PREGNANCY - Normal   CULTURE, URINE   HIV 1 / 2 ANTIBODY    Narrative:     Release to patient->Immediate   HEPATITIS C ANTIBODY    Narrative:     Release to patient->Immediate   ISTAT LACTATE        ECG Results              EKG 12-lead (Final result)  Result time 06/17/23 09:44:38      Final result by Interface, Lab In [facility] (06/17/23 09:44:38)                   Narrative:    Test Reason : R53.1,    Vent. Rate : 119 BPM     Atrial Rate : 119 BPM     P-R Int : 150 ms           QRS Dur : 074 ms      QT Int : 314 ms       P-R-T Axes : 058 058 021 degrees     QTc Int : 441 ms    Sinus tachycardia  Possible Left atrial enlargement  Borderline Abnormal ECG  When compared with ECG of 28-MAY-2023 01:40,  No significant change was found  Confirmed by Sohail SANZ MD ([id number]) on 6/17/2023 9:44:30 AM    Referred By: System System           Confirmed By:Sohail SANZ MD                                  Imaging Results              US Transplant Kidney With Doppler (Final result)  Result time 06/17/23 13:11:35      Final result by Edilson Moreno MD (06/17/23 13:11:35)                   Impression:      Satisfactory gray scale and doppler evaluation of renal allograft.    Mild hydronephrosis similar to prior.    Electronically signed by resident: Tony Stephens  Date:    06/17/2023  Time:    12:17    Electronically signed by: Edilson Moreno MD  Date:    06/17/2023  Time:    13:11               Narrative:    EXAMINATION:  US TRANSPLANT KIDNEY WITH DOPPLER    CLINICAL HISTORY:  s/p SPK, LLQ abdominal pain. R/O pain over the transplant kidney;    TECHNIQUE:  Real time gray scale and doppler ultrasound was performed over the patient's renal allograft.    COMPARISON:  Ultrasound transplant kidney 05/20/2023    FINDINGS:  Renal allograft in the right lower quadrant.  The allograft measures 14.1 cm. Normal perfusion. Persistent mild hydronephrosis similar to prior.  Normal corticomedullary distinction.    No fluid collections.    Vasculature:    Resistive indices are as follows: Interlobar 0.66 (previously 0.72), upper segmental 0.73 (previously 0.77) middle segmental 0.69 (previously 0.76), lower segmental 0.68 (previously 0.73)    Main renal artery peak systolic velocity: 157cm/sec with normal waveform.    Renal artery/iliac ratio: 0.9.    The main renal vein is patent.                                       X-Ray Chest AP Portable (Final result)  Result time 06/17/23 07:09:10      Final result by Jim CARRILLO  MD Patria (06/17/23 07:09:10)                   Impression:      No convincing evidence of acute cardiopulmonary disease.      Electronically signed by: Jim España  Date:    06/17/2023  Time:    07:09               Narrative:    EXAMINATION:  XR CHEST AP PORTABLE    CLINICAL HISTORY:  Sepsis;    TECHNIQUE:  Single frontal view of the chest was performed.    COMPARISON:  Chest radiograph performed 05/28/2023    FINDINGS:  Monitoring leads overlie the chest.  Cardiomediastinal contours appear to be within normal limits.    Lungs essentially clear.  No definite pneumothorax or large volume pleural effusion.    No acute findings within the visualized abdomen.  Osseous and soft tissue structures appear without definite acute change.                                       Medications   aspirin EC tablet 81 mg ( Oral Canceled Entry 6/18/23 0900)   predniSONE tablet 5 mg (5 mg Oral Given 6/18/23 0800)   sodium bicarbonate tablet 1,300 mg (1,300 mg Oral Given 6/18/23 0800)   tacrolimus capsule 3 mg (3 mg Oral Given 6/18/23 0757)   sodium chloride 0.9% flush 3 mL (3 mLs Intravenous Given 6/18/23 0600)   ondansetron disintegrating tablet 8 mg (has no administration in time range)   acetaminophen tablet 650 mg (has no administration in time range)   ondansetron injection 4 mg (has no administration in time range)   traMADoL tablet 50 mg (has no administration in time range)   ceFEPIme (MAXIPIME) 2 g in dextrose 5 % in water (D5W) 5 % 100 mL IVPB (MB+) (0 g Intravenous Stopped 6/18/23 0511)   fludrocortisone tablet 100 mcg (100 mcg Oral Given 6/18/23 0800)   ergocalciferol capsule 50,000 Units (50,000 Units Oral Given 6/18/23 0800)   lactated ringers bolus 2,109 mL (0 mLs Intravenous Stopped 6/17/23 1025)   cefTRIAXone (ROCEPHIN) 1 g in dextrose 5 % in water (D5W) 5 % 100 mL IVPB (MB+) (0 g Intravenous Stopped 6/17/23 0713)     Medical Decision Making:   Initial Assessment:   29 y/o female with recent kidney trans[lant and  UTI presents with urinary symptoms, mildly low blood pressure, and tachycardia.  Will initiate sepsis eval, fluids.   Will give rocephin.                        Clinical Impression:   Final diagnoses:  [R53.1] Weakness  [D72.829] Elevated WBC count  [D72.829] Leukocytosis, unspecified type (Primary)        ED Disposition Condition    Observation                 Ishaan Matamoros MD  06/18/23 [id number]

## 2023-06-17 NOTE — ASSESSMENT & PLAN NOTE
-PMH of ESRD secondary to diabetic nephropathy I and hypertension now s/p kidney and pancreas transplants on 11/3/2022 (Thymo induction, CMV D-/R+).   -Post-op course significant for urinary retention, GEORGE requiring biopsy 12/8 suspicious for ABMR (tx w IVIG 12/14 and 1/26) and e.coli UTI (last admitted end of January w UTI- treated w Cipro EOT 2/5/23). 5/28  UTI Tachycardia. Discharged on oral cipro.   - 6/17 Admitted with LLQ pain/tenderness

## 2023-06-17 NOTE — HPI
28-year-old female with PMH of ESRD secondary to diabetic nephropathy I and hypertension now s/p kidney and pancreas transplants on 11/3/2022 (Thymo induction, CMV D-/R+). 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  Recently admitted for UTI Tachycardia. Discharged on oral cipro. Today patient presents to ED with LLQ pain/tenderness, lower grade fever 99.2 (reports had taken tylenol prior to arrival to ED).  Blood cultures obtained in ED and given Ceftriaxone IV x1.  Noted on labs WBC 14.7.  Kidney U/S ordered. She denies  headache, chest pain, vomiting,  symptoms.      Interval history:  He reports feeling a bit better today, but still has discomfort in LLQ.  Denies dysuria or fever.  She reports eating well.  She has been ambulating in room.

## 2023-06-17 NOTE — H&P
[facility] Dept  Kidney Transplant  H&P      Subjective:     Chief Complaint/Reason for Admission: Elevated WBC, S/P Kidney/Pancrease Transplant    History of Present Illness:  28-year-old female with PMH of ESRD secondary to diabetic nephropathy I and hypertension now s/p kidney and pancreas transplants on 11/3/2022 (Thymo induction, CMV D-/R+). 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  Recently admitted for UTI Tachycardia. Discharged on oral cipro. Today patient presents to ED with LLQ pain/tenderness, lower grade fever 99.2 (reports had taken tylenol prior to arrival to ED).  Blood cultures obtained in ED and given Ceftriaxone IV x1.  Noted on labs WBC 14.7.  Kidney U/S ordered, continue IV ABX. She denies  headache, chest pain, vomiting,  symptoms.  Patient discussed with Dr Stern will admit.        Review of patient's allergies indicates:  No Known Allergies    Past Medical History:   Diagnosis Date    Acute cystitis without hematuria 11/25/2022    Acute kidney injury superimposed on chronic kidney disease 4/7/2021    Anemia     Anemia in ESRD (end-stage renal disease) 7/29/2020    Lab Results  Component Value Date   WBC 7.23 07/29/2020   HGB 7.1 (L) 07/29/2020   HCT 22.2 (L) 07/29/2020   MCV 91 07/29/2020    (H) 07/29/2020   Following with hematology and scheduled to undergo iron infusions    Bacteremia due to Escherichia coli 1/23/2023    Bilious vomiting with nausea 12/12/2022    Chronic hypertension with exacerbation during pregnancy in second trimester 11/6/2020    Current regimen (11/6/20):  - Carvedilol 12.5 mg BID - Nifedipine 30 mg daily Baseline CKD + proteinuria    Chronic kidney disease     Depression     Diabetes mellitus     Diabetic retinopathy     Diarrhea     Encounter for blood transfusion     End stage renal  failure on dialysis     Fever 12/1/2022    Fever of undetermined origin 12/12/2022    Gastroparesis     Glaucoma     Hepatomegaly 4/29/2021    History of diabetes mellitus, type I 12/20/2022    Hx of psychiatric care     Hyperlipidemia     Hypertension     Nausea and vomiting 12/12/2022    Nephrotic syndrome     Nephrotic syndrome 3/4/2021    Nonspecific reaction to tuberculin skin test without active tuberculosis 10/1/2021    Orthostatic hypotension 1/25/2023    Palpitations     Poor fetal growth affecting management of mother in second trimester 10/15/2020    Pyelonephritis, acute 11/26/2022    Restrictive lung disease     Retinal detachment     Sepsis 12/1/2022    Sepsis 11/25/2022    Sepsis due to Escherichia coli 1/23/2023    Severe pre-eclampsia in second trimester 11/6/2020    Severe sepsis 11/25/2022    SIRS (systemic inflammatory response syndrome) 12/6/2022    Status post pancreas transplantation 11/26/2022 11/2/2022    Type 1 diabetes mellitus with end-stage renal disease (ESRD) 2/24/2015    Followed by Dr. Mayo.  Last A1C was 13  Currently on lantus 20 units qAM and humolog 10 units with meals  - a fetal echocardiogram around 22-24 weeks - needs eye exam, podiatry exam  - needs EKG, maternal echo and fu with cardiology  - ASA 81mg, folic acid 4mg PO daily - hemoglobin A1c every 4-6 weeks -24 hour urine for protein and creatinine clearance should be performed. Patient will also need a     Past Surgical History:   Procedure Laterality Date    AV FISTULA PLACEMENT Left 04/07/2021    Procedure: CREATION, AV FISTULA;  Surgeon: Roberto Ryan MD;  Location: [facility] OR [address] [place] [address];  Service: Peripheral Vascular;  Laterality: Left;    COLONOSCOPY N/A 03/16/2022    Procedure: COLONOSCOPY;  Surgeon: Tavo Kwok MD;  Location: [facility] (4TH FLR);  Service: Endoscopy;  Laterality: N/A;  Questionable history of delayed gastric emptying longstanding diabetes now on eating pre transplant workup for history  of nausea vomiting which seems to have improved with dialysis also chronic diarrhea and history of anemia pre transplant workup for kidney transplant. 3    CYSTOSCOPY      ESOPHAGOGASTRODUODENOSCOPY N/A 03/16/2022    Procedure: EGD (ESOPHAGOGASTRODUODENOSCOPY);  Surgeon: Tavo Kwok MD;  Location: [facility] (4TH FLR);  Service: Endoscopy;  Laterality: N/A;  Questionable history of delayed gastric emptying longstanding diabetes now on eating pre transplant workup for history of nausea vomiting which seems to have improved with dialysis also chronic diarrhea and history of anemia pre transplant workup for    FISTULOGRAM N/A 08/11/2021    Procedure: Fistulogram;  Surgeon: Roberto Ryan MD;  Location: [facility] CATH LAB;  Service: Cardiology;  Laterality: N/A;    KIDNEY TRANSPLANT Right 11/02/2022    Procedure: TRANSPLANT, KIDNEY;  Surgeon: Kumar Rodriges Jr., MD;  Location: [facility] OR 2ND FLR;  Service: Transplant;  Laterality: Right;    LASER PHOTOCOAGULATION OF RETINA Right 05/31/2022    Procedure: PHOTOCOAGULATION, RETINA, USING LASER;  Surgeon: Maximilian Montaño MD;  Location: [facility] OR 1ST FLR;  Service: Ophthalmology;  Laterality: Right;    PERCUTANEOUS TRANSLUMINAL ANGIOPLASTY OF ARTERIOVENOUS FISTULA N/A 08/11/2021    Procedure: PTA, AV FISTULA;  Surgeon: Roberto Ryan MD;  Location: [facility] CATH LAB;  Service: Cardiology;  Laterality: N/A;    REMOVAL IMPLANT, POSTERIOR SEGMENT, INTRAOCULAR Right 02/01/2022    Procedure: REMOVAL IMPLANT, POSTERIOR SEGMENT, INTRAOCULAR;  Surgeon: Maximilian Montaño MD;  Location: [facility] 1ST FLR;  Service: Ophthalmology;  Laterality: Right;    REPAIR OF RETINAL DETACHMENT WITH VITRECTOMY Right 01/25/2022    Procedure: REPAIR, RETINAL DETACHMENT, WITH VITRECTOMY, MEMBRANE PEEL, LASER, INJECTION OF GAS VS OIL;  Surgeon: Maximilian Montaño MD;  Location: [facility] 1ST FLR;  Service: Ophthalmology;  Laterality: Right;    REPAIR, RETINAL DETACHMENT, COMPLEX, WITH  VITRECTOMY AND MEMBRANE PEELING Right 3/21/2023    Procedure: REPAIR,RETINAL DETACHMENT,COMPLEX,WITH VITRECTOMY AND MEMBRANE PEELING;  Surgeon: Maximilian Montaño MD;  Location: [facility] OR 1ST FLR;  Service: Ophthalmology;  Laterality: Right;  25ga    REVISION OF ARTERIOVENOUS FISTULA Left 12/10/2021    Procedure: REVISION, AV FISTULA with BVT;  Surgeon: Roberto Ryan MD;  Location: [facility] OR 2ND FLR;  Service: Peripheral Vascular;  Laterality: Left;    TRANSPLANTATION OF PANCREAS N/A 11/02/2022    Procedure: TRANSPLANT, PANCREAS;  Surgeon: Kumar Rodriges Jr., MD;  Location: [facility] OR 2ND FLR;  Service: Transplant;  Laterality: N/A;    VITRECTOMY BY PARS PLANA APPROACH Right 02/01/2022    Procedure: VITRECTOMY, PARS PLANA APPROACH;  Surgeon: Maximilian Montaño MD;  Location: [facility] OR 1ST FLR;  Service: Ophthalmology;  Laterality: Right;    VITRECTOMY BY PARS PLANA APPROACH Right 05/31/2022    Procedure: VITRECTOMY, PARS PLANA APPROACH;  Surgeon: Maximilian Montaño MD;  [facility];  Service: Ophthalmology;  Laterality: Right;     Family History       Problem Relation (Age of Onset)    Diabetes Brother    Heart disease Father    Hypertension Mother          Tobacco Use    Smoking status: Never    Smokeless tobacco: Never   Substance and Sexual Activity    Alcohol use: No    Drug use: No    Sexual activity: Not Currently     Partners: Male     Comment: monogamous        Review of Systems   Constitutional:  Negative for activity change and appetite change.   HENT: Negative.     Eyes:  Negative for visual disturbance.   Respiratory:  Negative for shortness of breath.    Cardiovascular:  Negative for chest pain, palpitations and leg swelling.   Gastrointestinal:  Positive for abdominal pain. Negative for abdominal distention, diarrhea, nausea and vomiting.        LLQ tenderness    Genitourinary:  Negative for difficulty urinating.   Musculoskeletal:  Negative for arthralgias, back pain and joint  swelling.   Skin:  Negative for wound.   Allergic/Immunologic: Positive for immunocompromised state.   Neurological:  Negative for dizziness and facial asymmetry.   Hematological:  Negative for adenopathy. Does not bruise/bleed easily.   Psychiatric/Behavioral:  Negative for agitation and behavioral problems.    All other systems reviewed and are negative.  Objective:     Vital Signs (Most Recent):  Temp: 98.2 °F (36.8 °C) (06/17/23 0717)  Pulse: 109 (06/17/23 0833)  Resp: 18 (06/17/23 0833)  BP: 135/71 (06/17/23 0833)  SpO2: 99 % (06/17/23 0833)     Weight: 70.3 kg (155 lb)  Body mass index is 26.61 kg/m².      Physical Exam  Vitals and nursing note reviewed.   Constitutional:       Appearance: She is well-developed.   HENT:      Head: Normocephalic.   Eyes:      Conjunctiva/sclera: Conjunctivae normal.   Cardiovascular:      Rate and Rhythm: Normal rate and regular rhythm.      Heart sounds: No murmur heard.  Pulmonary:      Effort: Pulmonary effort is normal.      Breath sounds: Normal breath sounds.   Abdominal:      General: Bowel sounds are normal. There is no distension.      Palpations: Abdomen is soft.      Tenderness: There is no abdominal tenderness.       Musculoskeletal:         General: Normal range of motion.      Cervical back: Normal range of motion.   Skin:     General: Skin is warm and dry.      Capillary Refill: Capillary refill takes less than 2 seconds.   Neurological:      Mental Status: She is alert and oriented to person, place, and time.   Psychiatric:         Behavior: Behavior normal.         Thought Content: Thought content normal.         Judgment: Judgment normal.        Laboratory  CBC:   Recent Labs   Lab 06/12/23 0826 06/17/23 0617   WBC 4.28 14.74*   RBC 4.39 4.30   HGB 13.2 12.9   HCT 41.4 40.4    299   MCV 94 94   MCH 30.1 30.0   MCHC 31.9* 31.9*     BMP:   Recent Labs   Lab 06/12/23  0826 06/17/23 0617   GLU 85 108    139   K 4.3 5.0    110   CO2 22* 15*    BUN 19 39*   CREATININE 0.8 1.1   CALCIUM 10.0 9.5     CMP:   Recent Labs   Lab 06/12/23  0826 06/17/23  0617   GLU 85 108   CALCIUM 10.0 9.5   ALBUMIN 3.9 3.8   PROT  --  7.2    139   K 4.3 5.0   CO2 22* 15*    110   BUN 19 39*   CREATININE 0.8 1.1   ALKPHOS  --  68   ALT  --  39   AST  --  24     Labs within the past 24 hours have been reviewed.    Diagnostic Results:  US - Kidney: Results for orders placed during the hospital encounter of 05/27/23    US Transplant Kidney With Doppler    Narrative  EXAMINATION:  US TRANSPLANT KIDNEY WITH DOPPLER    CLINICAL HISTORY:  s/p kidney pancreas transplant w recurrent UTI;    TECHNIQUE:  Real time gray scale and doppler ultrasound was performed over the patient's renal allograft.    COMPARISON:  U/S transplant kidney with Doppler 01/22/2023, 12/11/2022; CT abdomen pelvis 01/21/2023    FINDINGS:  Patient status-post renal transplant 11/02/2022.  The transplant lies in the right lower quadrant and measures 13.1 cm.    The renal transplant demonstrates no focal parenchymal abnormality. Mild transplant hydronephrosis with echogenic material in the collecting system.  No peritransplant fluid.    Renal arterial resistive indices are as follows: Interlobar 0.72, segmental Up 0.77, segmental Mid 0.76, segmental Low 0.73.  Renal arterial resistive indices previously ranged from 0.75 to 0.79.  There is no evidence of a tardus parvus waveform. The maximum velocity in the main renal artery is 278 cm/sec (previously 244 cm/sec).  The maximum velocity in the iliac artery measures 270 cm/sec.  The RA/iliac ratio measures 1.0.    The renal transplant venous system is unremarkable.    Impression  Stable exam. Persistent mild transplant hydronephrosis with echogenic material in the collecting system.  Recommend correlation with urinalysis.    Satisfactory Doppler evaluation of the allograft.    Electronically signed by resident: Jomar  Ester  Date:    2023  Time:    11:49    Electronically signed by: Uche Tatum  Date:    2023  Time:    11:58    Patient was SARS-CoV-2 /COVID-19 tested with negative results.     Assessment/Plan:     Cardiac/Vascular  Hypertension, essential  - Conitnue home medication       Renal/  Status post -donor simultaneous pancreas kidney transplantation  -PMH of ESRD secondary to diabetic nephropathy I and hypertension now s/p kidney and pancreas transplants on 11/3/2022 (Thymo induction, CMV D-/R+).   -Post-op course significant for urinary retention, GEORGE requiring biopsy  suspicious for ABMR (tx w IVIG  and ) and e.coli UTI (last admitted end of January w UTI- treated w Cipro EOT 23).   UTI Tachycardia. Discharged on oral cipro.   -  Admitted with LLQ pain/tenderness        ID  At risk for opportunistic infections  - resume appropriate OI prophylaxis per protocol.   - hold cellcept on admission          Immunology/Multi System  Prophylactic immunotherapy  - continue prograf  - continue to monitor for toxic side effects and check daily levels. Will adjust for therapeutic dose      Oncology  Leukocytosis  - 14.7 WBC  - given ceftriaxone in ED x1, bld culture obtained NOTED U/A not obtained prior to U/A  - Monitor       Palliative Care  Long-term use of immunosuppressant medication  - see prophylactic immunotherapy               Discharge Planning:  Patient not candidate for discharge at this time     Karen Moscoso, GOYO  Kidney Transplant  [facility] - [facility] Dept

## 2023-06-17 NOTE — ED NOTES
LOC: The patient is awake, alert, and oriented to self, place, time, and situation. Pt is calm and cooperative. Affect is appropriate.  Speech is appropriate and clear.      APPEARANCE: Patient resting comfortably. Patient is clean and well groomed.     SKIN: The skin is warm and dry; color consistent with ethnicity.  Patient has normal skin turgor and moist mucus membranes.  Skin intact; no breakdown or bruising noted.      MUSCULOSKELETAL: Patient moving upper and lower extremities without difficulty; denies pain in the extremities or back.      RESPIRATORY: Airway is open and patent. Respirations spontaneous, even, easy, and non-labored.  Patient has a normal effort and rate.  No accessory muscle use noted. Denies cough.      CARDIAC:   No peripheral edema noted. No complaints of chest pain. Radial pulse regular. Denies chest pain.     ABDOMEN: Soft and non tender to palpation.  No distention noted. Pt denies abdominal pain currently; denies nausea, vomiting, diarrhea, or constipation.     NEUROLOGIC: Eyes open spontaneously.  Behavior appropriate to situation.  Follows commands; facial expression symmetrical.  Purposeful motor response noted; normal sensation in all extremities.

## 2023-06-17 NOTE — ED TRIAGE NOTES
Isabela Read, a 28 y.o. female presents to the ED w/ complaint of N/V and upper abd pain starting a coupe hours ago, States pain is where her transplanted organs are. States feels weak and having urinary frequency, denies dysuria. Hx kidney and pancreas transplant Nov 2022    Triage note:  Chief Complaint   Patient presents with    Abdominal Pain     Lower abd pain with N/V and subjective fever since last night. Reports this feels similar to her previous uti's. Hx kidney transplant 11/2022     Review of patient's allergies indicates:  No Known Allergies  Past Medical History:   Diagnosis Date    Acute cystitis without hematuria 11/25/2022    Acute kidney injury superimposed on chronic kidney disease 4/7/2021    Anemia     Anemia in ESRD (end-stage renal disease) 7/29/2020    Lab Results  Component Value Date   WBC 7.23 07/29/2020   HGB 7.1 (L) 07/29/2020   HCT 22.2 (L) 07/29/2020   MCV 91 07/29/2020    (H) 07/29/2020   Following with hematology and scheduled to undergo iron infusions    Bacteremia due to Escherichia coli 1/23/2023    Bilious vomiting with nausea 12/12/2022    Chronic hypertension with exacerbation during pregnancy in second trimester 11/6/2020    Current regimen (11/6/20):  - Carvedilol 12.5 mg BID - Nifedipine 30 mg daily Baseline CKD + proteinuria    Chronic kidney disease     Depression     Diabetes mellitus     Diabetic retinopathy     Diarrhea     Encounter for blood transfusion     End stage renal failure on dialysis     Fever 12/1/2022    Fever of undetermined origin 12/12/2022    Gastroparesis     Glaucoma     Hepatomegaly 4/29/2021    History of diabetes mellitus, type I 12/20/2022    Hx of psychiatric care     Hyperlipidemia     Hypertension     Nausea and vomiting 12/12/2022    Nephrotic syndrome     Nephrotic syndrome 3/4/2021    Nonspecific reaction to tuberculin skin test without active tuberculosis 10/1/2021    Orthostatic hypotension 1/25/2023    Palpitations      Poor fetal growth affecting management of mother in second trimester 10/15/2020    Pyelonephritis, acute 11/26/2022    Restrictive lung disease     Retinal detachment     Sepsis 12/1/2022    Sepsis 11/25/2022    Sepsis due to Escherichia coli 1/23/2023    Severe pre-eclampsia in second trimester 11/6/2020    Severe sepsis 11/25/2022    SIRS (systemic inflammatory response syndrome) 12/6/2022    Status post pancreas transplantation 11/26/2022 11/2/2022    Type 1 diabetes mellitus with end-stage renal disease (ESRD) 2/24/2015    Followed by Dr. Mayo.  Last A1C was 13  Currently on lantus 20 units qAM and humolog 10 units with meals  - a fetal echocardiogram around 22-24 weeks - needs eye exam, podiatry exam  - needs EKG, maternal echo and fu with cardiology  - ASA 81mg, folic acid 4mg PO daily - hemoglobin A1c every 4-6 weeks -24 hour urine for protein and creatinine clearance should be performed. Patient will also need a

## 2023-06-17 NOTE — ASSESSMENT & PLAN NOTE
- 14.7 WBC  - given ceftriaxone in ED x1, bld culture obtained NOTED U/A not obtained prior to U/A  - Monitor

## 2023-06-17 NOTE — PHARMACY MED REC
"Admission Medication History     The home medication history was taken by Maylin Mccloud.    You may go to "Admission" then "Reconcile Home Medications" tabs to review and/or act upon these items.     The home medication list has been updated by the Pharmacy department.   Please read ALL comments highlighted in yellow.   Please address this information as you see fit.    Feel free to contact us if you have any questions or require assistance.      The medications listed below were removed from the home medication list. Please reorder if appropriate:  Patient reports no longer taking the following medication(s):  MULTIVITAMIN   ONDANSETRON ODT 8 MG      Medications listed below were obtained from: Patient/family  Current Outpatient Medications on File Prior to Encounter   Medication Sig    aspirin (ECOTRIN) 81 MG EC tablet Take 81 mg by mouth once daily.    ketoconazole (NIZORAL) 2 % cream Apply topically 2 (two) times daily as needed for dry areas of face    medroxyPROGESTERone (DEPO-PROVERA) 150 mg/mL Syrg Inject 1 mL (150 mg total) into the muscle once. for 1 dose    mycophenolate (CELLCEPT) 250 mg Cap Take 2 capsules (500 mg total) by mouth 2 (two) times daily.    predniSONE (DELTASONE) 5 MG tablet Take 1 tablet (5 mg total) by mouth once daily.    promethazine (PHENERGAN) 12.5 MG Tab Take 1 tablet (12.5 mg total) by mouth every 6 (six) hours as needed (nausea).    rosuvastatin (CRESTOR) 10 MG tablet Take 1 tablet (10 mg total) by mouth every evening.    sodium bicarbonate 650 MG tablet Take 2 tablets (1,300 mg total) by mouth 3 (three) times daily.    tacrolimus (PROGRAF) 1 MG Cap Take 3 capsules (3 mg total) by mouth every morning AND 3 capsules (3 mg total) every evening.    tretinoin (RETIN-A) 0.05 % cream Apply topically every evening. Start with every other night and move up to nightly after 2 weeks if not too dry.    ergocalciferol (ERGOCALCIFEROL) 50,000 unit Cap Take 1 capsule (50,000 Units total) by mouth " every 7 days.               Potential issues to be addressed PRIOR TO DISCHARGE  Patient requested refills for the following medications: (ROSUVASTATIN 10 MG)    Maylin Mccloud  EXT [id number]                  .

## 2023-06-18 PROBLEM — R10.32 LLQ ABDOMINAL PAIN: Status: ACTIVE | Noted: 2023-06-18

## 2023-06-18 LAB
ALBUMIN SERPL BCP-MCNC: 3.2 G/DL (ref 3.5–5.2)
ANION GAP SERPL CALC-SCNC: 9 MMOL/L (ref 8–16)
BACTERIA UR CULT: NORMAL
BASOPHILS # BLD AUTO: 0.04 K/UL (ref 0–0.2)
BASOPHILS NFR BLD: 0.5 % (ref 0–1.9)
BUN SERPL-MCNC: 21 MG/DL (ref 6–20)
CALCIUM SERPL-MCNC: 9.6 MG/DL (ref 8.7–10.5)
CHLORIDE SERPL-SCNC: 114 MMOL/L (ref 95–110)
CO2 SERPL-SCNC: 18 MMOL/L (ref 23–29)
CREAT SERPL-MCNC: 0.8 MG/DL (ref 0.5–1.4)
DIFFERENTIAL METHOD: ABNORMAL
EOSINOPHIL # BLD AUTO: 0.1 K/UL (ref 0–0.5)
EOSINOPHIL NFR BLD: 1.6 % (ref 0–8)
ERYTHROCYTE [DISTWIDTH] IN BLOOD BY AUTOMATED COUNT: 14 % (ref 11.5–14.5)
EST. GFR  (NO RACE VARIABLE): >60 ML/MIN/1.73 M^2
GLUCOSE SERPL-MCNC: 87 MG/DL (ref 70–110)
HCT VFR BLD AUTO: 39.9 % (ref 37–48.5)
HGB BLD-MCNC: 12.4 G/DL (ref 12–16)
IMM GRANULOCYTES # BLD AUTO: 0.03 K/UL (ref 0–0.04)
IMM GRANULOCYTES NFR BLD AUTO: 0.4 % (ref 0–0.5)
LYMPHOCYTES # BLD AUTO: 0.8 K/UL (ref 1–4.8)
LYMPHOCYTES NFR BLD: 10.6 % (ref 18–48)
MCH RBC QN AUTO: 30.2 PG (ref 27–31)
MCHC RBC AUTO-ENTMCNC: 31.1 G/DL (ref 32–36)
MCV RBC AUTO: 97 FL (ref 82–98)
MONOCYTES # BLD AUTO: 1.2 K/UL (ref 0.3–1)
MONOCYTES NFR BLD: 14.8 % (ref 4–15)
NEUTROPHILS # BLD AUTO: 5.7 K/UL (ref 1.8–7.7)
NEUTROPHILS NFR BLD: 72.1 % (ref 38–73)
NRBC BLD-RTO: 0 /100 WBC
PHOSPHATE SERPL-MCNC: 3.2 MG/DL (ref 2.7–4.5)
PLATELET # BLD AUTO: 260 K/UL (ref 150–450)
PMV BLD AUTO: 10.5 FL (ref 9.2–12.9)
POTASSIUM SERPL-SCNC: 3.9 MMOL/L (ref 3.5–5.1)
RBC # BLD AUTO: 4.1 M/UL (ref 4–5.4)
SODIUM SERPL-SCNC: 141 MMOL/L (ref 136–145)
TACROLIMUS BLD-MCNC: 4.5 NG/ML (ref 5–15)
WBC # BLD AUTO: 7.93 K/UL (ref 3.9–12.7)

## 2023-06-18 PROCEDURE — 80069 RENAL FUNCTION PANEL: CPT | Performed by: NURSE PRACTITIONER

## 2023-06-18 PROCEDURE — 99233 PR SUBSEQUENT HOSPITAL CARE,LEVL III: ICD-10-PCS | Mod: ,,, | Performed by: INTERNAL MEDICINE

## 2023-06-18 PROCEDURE — 96366 THER/PROPH/DIAG IV INF ADDON: CPT

## 2023-06-18 PROCEDURE — A4216 STERILE WATER/SALINE, 10 ML: HCPCS | Performed by: NURSE PRACTITIONER

## 2023-06-18 PROCEDURE — G0378 HOSPITAL OBSERVATION PER HR: HCPCS

## 2023-06-18 PROCEDURE — 63600175 PHARM REV CODE 636 W HCPCS: Performed by: NURSE PRACTITIONER

## 2023-06-18 PROCEDURE — 36415 COLL VENOUS BLD VENIPUNCTURE: CPT | Performed by: NURSE PRACTITIONER

## 2023-06-18 PROCEDURE — 80197 ASSAY OF TACROLIMUS: CPT | Performed by: NURSE PRACTITIONER

## 2023-06-18 PROCEDURE — 85025 COMPLETE CBC W/AUTO DIFF WBC: CPT | Performed by: NURSE PRACTITIONER

## 2023-06-18 PROCEDURE — 99233 SBSQ HOSP IP/OBS HIGH 50: CPT | Mod: ,,, | Performed by: INTERNAL MEDICINE

## 2023-06-18 PROCEDURE — 63600175 PHARM REV CODE 636 W HCPCS: Performed by: INTERNAL MEDICINE

## 2023-06-18 PROCEDURE — 25000003 PHARM REV CODE 250: Performed by: NURSE PRACTITIONER

## 2023-06-18 PROCEDURE — 25000003 PHARM REV CODE 250: Performed by: INTERNAL MEDICINE

## 2023-06-18 RX ORDER — TACROLIMUS 1 MG/1
5 CAPSULE ORAL 2 TIMES DAILY
Status: DISCONTINUED | OUTPATIENT
Start: 2023-06-18 | End: 2023-06-19 | Stop reason: HOSPADM

## 2023-06-18 RX ADMIN — PREDNISONE 5 MG: 5 TABLET ORAL at 08:06

## 2023-06-18 RX ADMIN — TACROLIMUS 3 MG: 1 CAPSULE ORAL at 07:06

## 2023-06-18 RX ADMIN — TACROLIMUS 5 MG: 1 CAPSULE ORAL at 05:06

## 2023-06-18 RX ADMIN — Medication 3 ML: at 08:06

## 2023-06-18 RX ADMIN — ASPIRIN 81 MG: 81 TABLET, COATED ORAL at 07:06

## 2023-06-18 RX ADMIN — FLUDROCORTISONE ACETATE 100 MCG: 0.1 TABLET ORAL at 08:06

## 2023-06-18 RX ADMIN — Medication 3 ML: at 02:06

## 2023-06-18 RX ADMIN — CEFEPIME 2 G: 2 INJECTION, POWDER, FOR SOLUTION INTRAVENOUS at 04:06

## 2023-06-18 RX ADMIN — SODIUM BICARBONATE 1300 MG: 650 TABLET ORAL at 08:06

## 2023-06-18 RX ADMIN — Medication 3 ML: at 06:06

## 2023-06-18 RX ADMIN — SODIUM BICARBONATE 1300 MG: 650 TABLET ORAL at 02:06

## 2023-06-18 RX ADMIN — ERGOCALCIFEROL 50000 UNITS: 1.25 CAPSULE, LIQUID FILLED ORAL at 08:06

## 2023-06-18 NOTE — PROGRESS NOTES
[facility]  Kidney Transplant  Progress Note      Reason for Follow-up: Reassessment of Kidney, Pancreas Transplant - 11/3/2022  (#1) recipient and management of immunosuppression.    ORGAN:  RIGHT KIDNEY   Donor Type:  Donation after Brain Death     Subjective:   History of Present Illness:  28-year-old female with PMH of ESRD secondary to diabetic nephropathy I and hypertension now s/p kidney and pancreas transplants on 11/3/2022 (Thymo induction, CMV D-/R+). 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  Recently admitted for UTI Tachycardia. Discharged on oral cipro. Today patient presents to ED with LLQ pain/tenderness, lower grade fever 99.2 (reports had taken tylenol prior to arrival to ED).  Blood cultures obtained in ED and given Ceftriaxone IV x1.  Noted on labs WBC 14.7.  Kidney U/S ordered. She denies  headache, chest pain, vomiting,  symptoms.      Interval history:  He reports feeling a bit better today, but still has discomfort in LLQ.  Denies dysuria or fever.  She reports eating well.  She has been ambulating in room.       Past Medical, Surgical, Family, and Social History:   Unchanged from H&P.    Scheduled Meds:   aspirin  81 mg Oral Daily    ceFEPime (MAXIPIME) IVPB  2 g Intravenous Q12H    ergocalciferol  50,000 Units Oral Q7 Days    fludrocortisone  100 mcg Oral Daily    predniSONE  5 mg Oral Daily    sodium bicarbonate  1,300 mg Oral TID    sodium chloride 0.9%  3 mL Intravenous Q8H    tacrolimus  5 mg Oral BID     Continuous Infusions:  PRN Meds:acetaminophen, ondansetron, ondansetron, traMADoL    Intake/Output - Last 3 Shifts         06/16 0700  06/17 0659 06/17 0700 06/18 0659 06/18 0700 06/19 0659    P.O.  840     I.V. (mL/kg)  0 (0)     Other  0     IV Piggyback  1168.1     Total Intake(mL/kg)  2008.1  "(28)     Urine (mL/kg/hr)  1900 (1.1) 1200 (1.7)    Emesis/NG output  0     Other  0     Stool  0     Blood  0     Total Output  1900 1200    Net  +108.1 -1200           Urine Occurrence  0 x     Stool Occurrence  0 x     Emesis Occurrence  0 x            Review of Systems   Eyes:  Negative for visual disturbance.   Respiratory:  Negative for shortness of breath.    Cardiovascular:  Negative for chest pain and leg swelling.   Gastrointestinal:  Positive for abdominal pain. Negative for nausea.        LLQ tenderness    Genitourinary:  Negative for difficulty urinating.   Musculoskeletal: Negative.    Allergic/Immunologic: Positive for immunocompromised state.   All other systems reviewed and are negative.     Objective:     Vital Signs (Most Recent):  Temp: 98 °F (36.7 °C) (06/18/23 1120)  Pulse: 101 (06/18/23 1509)  Resp: 18 (06/18/23 1120)  BP: 131/76 (06/18/23 1120)  SpO2: 97 % (06/18/23 0754) Vital Signs (24h Range):  Temp:  [98 °F (36.7 °C)-99.1 °F (37.3 °C)] 98 °F (36.7 °C)  Pulse:  [] 101  Resp:  [16-18] 18  SpO2:  [97 %-100 %] 97 %  BP: (122-137)/(63-77) 131/76     Weight: 71.6 kg (157 lb 13.6 oz)  Height: 5' 4" (162.6 cm)  Body mass index is 27.09 kg/m².     Physical Exam  Constitutional:       General: She is not in acute distress.  Cardiovascular:      Rate and Rhythm: Normal rate and regular rhythm.   Pulmonary:      Effort: Pulmonary effort is normal. No respiratory distress.      Breath sounds: Normal breath sounds.   Abdominal:      General: Bowel sounds are normal.      Palpations: Abdomen is soft. There is no mass.      Tenderness: There is no abdominal tenderness.        Laboratory:  CBC:   Recent Labs   Lab 06/12/23  0826 06/17/23  0617 06/18/23  0705   WBC 4.28 14.74* 7.93   RBC 4.39 4.30 4.10   HGB 13.2 12.9 12.4   HCT 41.4 40.4 39.9    299 260   MCV 94 94 97   MCH 30.1 30.0 30.2   MCHC 31.9* 31.9* 31.1*     CMP:   Recent Labs   Lab 06/12/23  0826 06/17/23  0617 06/18/23  0705   GLU " 85 108 87   CALCIUM 10.0 9.5 9.6   ALBUMIN 3.9 3.8 3.2*   PROT  --  7.2  --     139 141   K 4.3 5.0 3.9   CO2 22* 15* 18*    110 114*   BUN 19 39* 21*   CREATININE 0.8 1.1 0.8   ALKPHOS  --  68  --    ALT  --  39  --    AST  --  24  --      Renal ultrasound 2023 showed kidney in RLQ, measuring 14.1 cm.  Mild persistent hydronephrosis as before.    Assessment/Plan:     * Leukocytosis  - 14.7 WBC on admission, improved  - given ceftriaxone in ED x1  - Bld and urine culture obtained pre-atb, no growth so far  - given ongoing abdominal pain, may benefit from CT scan after discussion with transplant surgery    - Monitor       Status post -donor simultaneous pancreas kidney transplantation  -PMH of ESRD secondary to diabetic nephropathy I and hypertension now s/p kidney and pancreas transplants on 11/3/2022 (Thymo induction, CMV D-/R+).   -Post-op course significant for urinary retention, GEORGE requiring biopsy  suspicious for ABMR (tx w IVIG  and ) and e.coli UTI (last admitted end of January w UTI- treated w Cipro EOT 23).   UTI Tachycardia. Discharged on oral cipro.   -  Admitted with LLQ pain/tenderness        LLQ abdominal pain  - etiology unclear.  Kidney transplant ultrasound reported to be on RLQ, however prior CT showed the kidney to be more midline.      Hypertension, essential  - Conitnue home medication       Long-term use of immunosuppressant medication  - see prophylactic immunotherapy         Prophylactic immunotherapy  - continue prograf  - continue to monitor for toxic side effects and check daily levels. Will adjust for therapeutic dose      At risk for opportunistic infections  - resume appropriate OI prophylaxis per protocol.   - hold cellcept on admission        Discharge Planning:  Await final urine culture and review of presentation with transplant surgery    Elmira Celis MD  Kidney Transplant  [facility]

## 2023-06-18 NOTE — ASSESSMENT & PLAN NOTE
- 14.7 WBC on admission, improved  - given ceftriaxone in ED x1  - Bld and urine culture obtained pre-atb, no growth so far  - given ongoing abdominal pain, may benefit from CT scan after discussion with transplant surgery    - Monitor

## 2023-06-18 NOTE — PLAN OF CARE
Pt aao x3. Vss. No acute distress. Pt steady on her feet. Pt denies any n,v, or abdominal pain. Pt ambulates freely in room. Pt on cefepime for possible UTI. Urine sent to lab came back cloudy. Pt NSR-ST on tele. Left upper arm fistula ++. See assessment for full chart details. Will continue to monitor, assess and adjust care as needed.

## 2023-06-18 NOTE — PLAN OF CARE
Pt remains AAO x 4 with VSS, afebrile, sats upper 90s on RA, /60s throughout shift  R FA 20g - CDI, saline locked  L UA fistula +/+  UA dirty on admit - urine culture pending. Cefepime q12hrs for UTI treatment  Blood cultures NGTD, CXR WNL, Cr 1.1, Kidney US done on admit  NS/ST on tele monitor - HR 90-100s  Pt up independent and ambulatory, voids in hat, LBM 6/17  Regular diet   Pt remains free from falls and injuries, call light in reach, bed in lowest position, nonskid socks on when OOB, side rails up x2  Will continue to monitor

## 2023-06-18 NOTE — ASSESSMENT & PLAN NOTE
- etiology unclear.  Kidney transplant ultrasound reported to be on RLQ, however prior CT showed the kidney to be more midline.

## 2023-06-19 VITALS
BODY MASS INDEX: 26.42 KG/M2 | HEIGHT: 64 IN | DIASTOLIC BLOOD PRESSURE: 90 MMHG | SYSTOLIC BLOOD PRESSURE: 144 MMHG | OXYGEN SATURATION: 100 % | TEMPERATURE: 98 F | HEART RATE: 105 BPM | WEIGHT: 154.75 LBS | RESPIRATION RATE: 18 BRPM

## 2023-06-19 DIAGNOSIS — Z94.83 STATUS POST SIMULTANEOUS KIDNEY AND PANCREAS TRANSPLANT: ICD-10-CM

## 2023-06-19 DIAGNOSIS — Z94.0 STATUS POST SIMULTANEOUS KIDNEY AND PANCREAS TRANSPLANT: ICD-10-CM

## 2023-06-19 PROBLEM — R10.32 LLQ ABDOMINAL PAIN: Status: RESOLVED | Noted: 2023-06-18 | Resolved: 2023-06-19

## 2023-06-19 PROBLEM — D72.829 LEUKOCYTOSIS: Status: RESOLVED | Noted: 2022-01-18 | Resolved: 2023-06-19

## 2023-06-19 PROBLEM — R53.1 WEAKNESS: Status: RESOLVED | Noted: 2023-06-17 | Resolved: 2023-06-19

## 2023-06-19 LAB
ALBUMIN SERPL BCP-MCNC: 3.4 G/DL (ref 3.5–5.2)
ANION GAP SERPL CALC-SCNC: 9 MMOL/L (ref 8–16)
BASOPHILS # BLD AUTO: 0.03 K/UL (ref 0–0.2)
BASOPHILS NFR BLD: 0.5 % (ref 0–1.9)
BUN SERPL-MCNC: 17 MG/DL (ref 6–20)
CALCIUM SERPL-MCNC: 9.8 MG/DL (ref 8.7–10.5)
CHLORIDE SERPL-SCNC: 112 MMOL/L (ref 95–110)
CO2 SERPL-SCNC: 19 MMOL/L (ref 23–29)
CREAT SERPL-MCNC: 0.8 MG/DL (ref 0.5–1.4)
DIFFERENTIAL METHOD: ABNORMAL
EOSINOPHIL # BLD AUTO: 0.3 K/UL (ref 0–0.5)
EOSINOPHIL NFR BLD: 4.5 % (ref 0–8)
ERYTHROCYTE [DISTWIDTH] IN BLOOD BY AUTOMATED COUNT: 13.8 % (ref 11.5–14.5)
EST. GFR  (NO RACE VARIABLE): >60 ML/MIN/1.73 M^2
GLUCOSE SERPL-MCNC: 85 MG/DL (ref 70–110)
HCT VFR BLD AUTO: 39.7 % (ref 37–48.5)
HGB BLD-MCNC: 12.6 G/DL (ref 12–16)
IMM GRANULOCYTES # BLD AUTO: 0.05 K/UL (ref 0–0.04)
IMM GRANULOCYTES NFR BLD AUTO: 0.9 % (ref 0–0.5)
LYMPHOCYTES # BLD AUTO: 1.1 K/UL (ref 1–4.8)
LYMPHOCYTES NFR BLD: 18.9 % (ref 18–48)
MCH RBC QN AUTO: 30.2 PG (ref 27–31)
MCHC RBC AUTO-ENTMCNC: 31.7 G/DL (ref 32–36)
MCV RBC AUTO: 95 FL (ref 82–98)
MONOCYTES # BLD AUTO: 1.1 K/UL (ref 0.3–1)
MONOCYTES NFR BLD: 19.4 % (ref 4–15)
NEUTROPHILS # BLD AUTO: 3.2 K/UL (ref 1.8–7.7)
NEUTROPHILS NFR BLD: 55.8 % (ref 38–73)
NRBC BLD-RTO: 0 /100 WBC
PHOSPHATE SERPL-MCNC: 3.1 MG/DL (ref 2.7–4.5)
PLATELET # BLD AUTO: 269 K/UL (ref 150–450)
PMV BLD AUTO: 10.7 FL (ref 9.2–12.9)
POTASSIUM SERPL-SCNC: 3.9 MMOL/L (ref 3.5–5.1)
RBC # BLD AUTO: 4.17 M/UL (ref 4–5.4)
SODIUM SERPL-SCNC: 140 MMOL/L (ref 136–145)
TACROLIMUS BLD-MCNC: 7.3 NG/ML (ref 5–15)
WBC # BLD AUTO: 5.72 K/UL (ref 3.9–12.7)

## 2023-06-19 PROCEDURE — 99239 PR HOSPITAL DISCHARGE DAY,>30 MIN: ICD-10-PCS | Mod: ,,,

## 2023-06-19 PROCEDURE — 25000003 PHARM REV CODE 250: Performed by: NURSE PRACTITIONER

## 2023-06-19 PROCEDURE — 36415 COLL VENOUS BLD VENIPUNCTURE: CPT | Performed by: NURSE PRACTITIONER

## 2023-06-19 PROCEDURE — 80197 ASSAY OF TACROLIMUS: CPT | Performed by: NURSE PRACTITIONER

## 2023-06-19 PROCEDURE — 99239 HOSP IP/OBS DSCHRG MGMT >30: CPT | Mod: ,,,

## 2023-06-19 PROCEDURE — 63600175 PHARM REV CODE 636 W HCPCS: Performed by: INTERNAL MEDICINE

## 2023-06-19 PROCEDURE — 80069 RENAL FUNCTION PANEL: CPT | Performed by: NURSE PRACTITIONER

## 2023-06-19 PROCEDURE — 63600175 PHARM REV CODE 636 W HCPCS: Performed by: NURSE PRACTITIONER

## 2023-06-19 PROCEDURE — G0378 HOSPITAL OBSERVATION PER HR: HCPCS

## 2023-06-19 PROCEDURE — 85025 COMPLETE CBC W/AUTO DIFF WBC: CPT | Performed by: NURSE PRACTITIONER

## 2023-06-19 PROCEDURE — 96366 THER/PROPH/DIAG IV INF ADDON: CPT

## 2023-06-19 PROCEDURE — A4216 STERILE WATER/SALINE, 10 ML: HCPCS | Performed by: NURSE PRACTITIONER

## 2023-06-19 RX ORDER — TACROLIMUS 1 MG/1
5 CAPSULE ORAL EVERY 12 HOURS
Qty: 300 CAPSULE | Refills: 11 | Status: SHIPPED | OUTPATIENT
Start: 2023-06-19 | End: 2023-06-26 | Stop reason: DRUGHIGH

## 2023-06-19 RX ORDER — TACROLIMUS 1 MG/1
5 CAPSULE ORAL EVERY 12 HOURS
Qty: 300 CAPSULE | Refills: 11 | Status: SHIPPED | OUTPATIENT
Start: 2023-06-19 | End: 2023-06-19 | Stop reason: SDUPTHER

## 2023-06-19 RX ADMIN — ASPIRIN 81 MG: 81 TABLET, COATED ORAL at 08:06

## 2023-06-19 RX ADMIN — SODIUM BICARBONATE 1300 MG: 650 TABLET ORAL at 08:06

## 2023-06-19 RX ADMIN — PREDNISONE 5 MG: 5 TABLET ORAL at 08:06

## 2023-06-19 RX ADMIN — Medication 3 ML: at 04:06

## 2023-06-19 RX ADMIN — TACROLIMUS 5 MG: 1 CAPSULE ORAL at 08:06

## 2023-06-19 RX ADMIN — CEFEPIME 2 G: 2 INJECTION, POWDER, FOR SOLUTION INTRAVENOUS at 04:06

## 2023-06-19 RX ADMIN — FLUDROCORTISONE ACETATE 100 MCG: 0.1 TABLET ORAL at 09:06

## 2023-06-19 NOTE — PLAN OF CARE
Pt remains AAO x 4 with VSS, afebrile, sats upper 90s on RA throughout shift  R FA 20g - CDI, saline locked  L UA fistula +/+  UA dirty on admit - urine culture NGTD. Cefepime q12hrs continued. Cr 0.8  NSR on tele monitor - HR 80-90s  Pt up independent and ambulatory, voids in hat, LBM 6/18  Regular diet   Pt remains free from falls and injuries, call light in reach, bed in lowest position, nonskid socks on when OOB, side rails up x2  Will continue to monitor

## 2023-06-19 NOTE — DISCHARGE SUMMARY
[facility]  Kidney Transplant  Discharge Summary    Patient Name: Isabela Read  MRN: [id number]  Admission Date: 6/17/2023  Hospital Length of Stay: 0 days  Discharge Date and Time:  06/19/2023 10:40 AM  Attending Physician: Elmira Celis,*   Discharging Provider: Niurka Vásquez PA-C  Primary Care Provider: Tavo Bhatia MD    HPI:   28-year-old female with PMH of ESRD secondary to diabetic nephropathy I and hypertension now s/p kidney and pancreas transplants on 11/3/2022 (Thymo induction, CMV D-/R+). 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  Recently admitted for UTI Tachycardia. Discharged on oral cipro. Today patient presents to ED with LLQ pain/tenderness, lower grade fever 99.2 (reports had taken tylenol prior to arrival to ED).  Blood cultures obtained in ED and given Ceftriaxone IV x1.  Noted on labs WBC 14.7.  Kidney U/S ordered. She denies  headache, chest pain, vomiting,  symptoms.        * No surgery found *     Hospital Course:    Ms. Read was admitted for w/u of LLQ pain, fever, leukocytosis. Infectious w/u NGTD, cefepime given x 3 days with resolution of leukocytosis and symptoms. Denies fever, dysuria, SOB, NVD. Will dc abx prior to dc. Needs to f/u with UroGyn for recurrent UTIs.    Patient ready and stable for discharge at this time. On day of discharge, patient ambulating without assistance, tolerating diet. F/u labs Monday, June 26th. F/u clinic appointment as previously scheduled.  Patient understands discharge instruction and importance of follow up.       Goals of Care Treatment Preferences:  Code Status: Full Code      Final Active Diagnoses:    Diagnosis Date Noted POA    Pancreas replaced by transplant [Z94.83] 11/26/2022 Not Applicable    Long-term use of immunosuppressant  medication [Z79.60] 2022 Not Applicable    At risk for opportunistic infections [Z91.89] 2022 Yes    Status post -donor simultaneous pancreas kidney transplantation [Z94.0] 2022 Not Applicable    Prophylactic immunotherapy [Z29.8] 2022 Not Applicable    Acute angle-closure glaucoma of right eye [H40.211] 2022 Yes    Hypertension, essential [I10]  Yes      Problems Resolved During this Admission:    Diagnosis Date Noted Date Resolved POA    PRINCIPAL PROBLEM:  Leukocytosis [D72.829] 2022 Yes    LLQ abdominal pain [R10.32] 2023 Yes    Weakness [R53.1] 2023 Yes       Treatments: see above        Pending Diagnostic Studies:       None          Significant Diagnostic Studies: Labs:   CMP   Recent Labs   Lab 23  0705 23  0614    140   K 3.9 3.9   * 112*   CO2 18* 19*   GLU 87 85   BUN 21* 17   CREATININE 0.8 0.8   CALCIUM 9.6 9.8   ALBUMIN 3.2* 3.4*   ANIONGAP 9 9   , CBC   Recent Labs   Lab 23  0705 23  0614   WBC 7.93 5.72   HGB 12.4 12.6   HCT 39.9 39.7    269    and All labs within the past 24 hours have been reviewed    Discharged Condition: good    Disposition: home    Patient Instructions:      Diet Adult Regular     Notify your health care provider if you experience any of the following:  increased confusion or weakness     Notify your health care provider if you experience any of the following:  persistent dizziness, light-headedness, or visual disturbances     Notify your health care provider if you experience any of the following:  worsening rash     Notify your health care provider if you experience any of the following:  severe persistent headache     Notify your health care provider if you experience any of the following:  difficulty breathing or increased cough     Notify your health care provider if you experience any of the following:  redness, tenderness, or signs of  infection (pain, swelling, redness, odor or green/yellow discharge around incision site)     Notify your health care provider if you experience any of the following:  severe uncontrolled pain     Notify your health care provider if you experience any of the following:  persistent nausea and vomiting or diarrhea     Notify your health care provider if you experience any of the following:  temperature >100.4     Activity as tolerated     Medications:  Reconciled Home Medications:      Medication List        CHANGE how you take these medications      tacrolimus 1 MG Cap  Commonly known as: PROGRAF  Take 5 capsules (5 mg total) by mouth every 12 (twelve) hours.  What changed: See the new instructions.            CONTINUE taking these medications      aspirin 81 MG EC tablet  Commonly known as: ECOTRIN  Take 81 mg by mouth once daily.     ketoconazole 2 % cream  Commonly known as: NIZORAL  Apply topically 2 (two) times daily as needed for dry areas of face     medroxyPROGESTERone 150 mg/mL Syrg  Commonly known as: DEPO-PROVERA  Inject 1 mL (150 mg total) into the muscle once. for 1 dose     mycophenolate 250 mg Cap  Commonly known as: CELLCEPT  Take 2 capsules (500 mg total) by mouth 2 (two) times daily.     predniSONE 5 MG tablet  Commonly known as: DELTASONE  Take 1 tablet (5 mg total) by mouth once daily.     promethazine 12.5 MG Tab  Commonly known as: PHENERGAN  Take 1 tablet (12.5 mg total) by mouth every 6 (six) hours as needed (nausea).     rosuvastatin 10 MG tablet  Commonly known as: CRESTOR  Take 1 tablet (10 mg total) by mouth every evening.     sodium bicarbonate 650 MG tablet  Take 2 tablets (1,300 mg total) by mouth 3 (three) times daily.     tretinoin 0.05 % cream  Commonly known as: RETIN-A  Apply topically every evening. Start with every other night and move up to nightly after 2 weeks if not too dry.     VITAMIN D2 50,000 unit Cap  Generic drug: ergocalciferol  Take 1 capsule (50,000 Units total) by  mouth every 7 days.            Time spent caring for patient (Greater than 1/2 spent in direct face-to-face contact): > 30 minutes    Niurka Vásquez PA-C  Kidney Transplant  [facility]

## 2023-06-19 NOTE — PROGRESS NOTES
Discharge Medication Note:    Hospital Course:  Patient admitted for possible UTI. All cultures negative and patient received 3 days of IV abx. Decision made to dc patient with no other ABX therapy.    Met with Isabela Read at discharge to review discharge medications and to update the blue medication card.           Medication List        CHANGE how you take these medications      tacrolimus 1 MG Cap  Commonly known as: PROGRAF  Take 5 capsules (5 mg total) by mouth every 12 (twelve) hours.  What changed: See the new instructions.            CONTINUE taking these medications      aspirin 81 MG EC tablet  Commonly known as: ECOTRIN     ketoconazole 2 % cream  Commonly known as: NIZORAL  Apply topically 2 (two) times daily as needed for dry areas of face     medroxyPROGESTERone 150 mg/mL Syrg  Commonly known as: DEPO-PROVERA  Inject 1 mL (150 mg total) into the muscle once. for 1 dose     mycophenolate 250 mg Cap  Commonly known as: CELLCEPT  Take 2 capsules (500 mg total) by mouth 2 (two) times daily.     predniSONE 5 MG tablet  Commonly known as: DELTASONE  Take 1 tablet (5 mg total) by mouth once daily.     promethazine 12.5 MG Tab  Commonly known as: PHENERGAN  Take 1 tablet (12.5 mg total) by mouth every 6 (six) hours as needed (nausea).     rosuvastatin 10 MG tablet  Commonly known as: CRESTOR  Take 1 tablet (10 mg total) by mouth every evening.     sodium bicarbonate 650 MG tablet  Take 2 tablets (1,300 mg total) by mouth 3 (three) times daily.     tretinoin 0.05 % cream  Commonly known as: RETIN-A  Apply topically every evening. Start with every other night and move up to nightly after 2 weeks if not too dry.     VITAMIN D2 50,000 unit Cap  Generic drug: ergocalciferol  Take 1 capsule (50,000 Units total) by mouth every 7 days.               Where to Get Your Medications        These medications were sent to [facility]  [address], [place] [address]      Hours: Mon-Fri  7a-7p, Sat-Sun 10a-4p Phone: [phone]   tacrolimus 1 MG Cap            The following medications have been placed on HOLD and should be restarted in the outpatient setting (when appropriate): None    Isabela Read verbalized understanding and had the opportunity to ask questions.

## 2023-06-19 NOTE — NURSING
Pt is AAOx4, independent, and VSS. Medications delivered to bedside. Pharmacist provided updated blue card. PIV removed to DC. DC instructions explained and handout provided on when to call MD, upcoming appointments, medication list, PMH, [facility], COVID 19 information, and pt portal. Pt verbalized understanding and all questions answered to satisfaction. Transport offered and refused. Pt walked to parked car.

## 2023-06-19 NOTE — PROGRESS NOTES
Admit & Discharge Note     Met with patient to assess needs. Patient is a 28 y.o. single female, admitted for Weakness [R53.1]  Elevated WBC count [D72.829]     Patient admitted from home on 6/17/2023 .  At this time, patient presents as alert and oriented x 4, pleasant, good eye contact, well groomed, recall good, concentration/judgement good, average intelligence, calm, communicative, cooperative, and asking and answering questions appropriately.  At this time, patients caregiver not present.     Household/Family Systems     Patient resides with patient's mother and brother, at [address] [place] [address].  Support system includes pt's mother, Kiersten Oliva; pt's brother, Mustapha Read, and pt's uncle Florentin.  Patient does not have dependents that are need of being cared for.     Patients primary caregiver is Kiersten King, sara mother, phone number [phone].  Confirmed patients contact information is There is no home phone number on file.;   Telephone Information:   Mobile [phone]   .    During admission, patient's caregiver plans to stay at home.  Confirmed patient and patients caregivers do have access to reliable transportation.    Cognitive Status/Learning     Patient reports reading ability as 12th grade and states patient does not have difficulty with reading, writing, seeing, hearing, comprehension, learning, and memory.  Patient reports patient learns best by visual aid.   Needed: No.   Highest education level: High School (9-12) or GED    Vocation/Disability   .  Working for Income: No  If no, reason not working: Disability  Patient is disabled due to ESRD since 2020.  Prior to disability, patient  worked at [employer].    Adherence     Patient reports a high level of adherence to patients health care regimen.  Adherence counseling and education provided. Patient verbalizes understanding.    Substance Use    Patient reports the following  substance usage.    Tobacco: none, patient denies any use.  Alcohol: none, patient denies any use.  Illicit Drugs/Non-prescribed Medications: none, patient denies any use.  Patient states clear understanding of the potential impact of substance use.  Substance abstinence/cessation counseling, education and resources provided and reviewed.     Services Utilizing/ADLS    Infusion Service: Prior to admission, patient utilizing? no  Home Health: Prior to admission, patient utilizing? no  DME: Prior to admission, yes [facility]  Pulmonary/Cardiac Rehab: Prior to admission, no  Dialysis:  Prior to admission, no  Transplant Specialty Pharmacy:  Prior to admission, yes; [facility].    Prior to admission, patient reports patient was independent with ADLS and was driving.  Patient reports patient is able to care for self at this time..  Patient indicates a willingness to care for self once medically cleared to do so.    Insurance/Medications    Insured by   Payer/Plan Subscr  Sex Relation Sub. Ins. ID Effective Group Num   1. SIXTO BOATENG* 1995 Female Self [id number] SECUREFULL                                   [address]   2. SIXTO BOATENG* 1995 Female Self [id number] SECUREFULL                                   [address]      Primary Insurance (for UNOS reporting): Public Insurance - Medicare & Choice  Secondary Insurance (for UNOS reporting): Public Insurance - Medicaid    Patient reports patient is able to obtain and afford medications at this time and at time of discharge.    Living Will/Healthcare Power of     Patient states patient does not have a LW and/or HCPA.   provided education regarding LW and HCPA and the completion of forms.    Coping/Mental Health    Patient is coping well with the aid of  family members.  Patient denies mental health difficulties.     Discharge Planning    At time of discharge, patient plans to  return to patient's home under the care of self/pt's mother.  Patients will transport self home.  Per rounds today, expected discharge date is today, 6/19/2023 . Patient verbalizes understanding and are involved in treatment planning and discharge process.    Additional Concerns     remains available. Patient denies additional needs and/or concerns at this time. Patient verbalizes understanding and agreement with information reviewed, social work availability, and how to access available resources as needed.    Lucita Guillen LMSW

## 2023-06-22 ENCOUNTER — OFFICE VISIT (OUTPATIENT)
Dept: OPHTHALMOLOGY | Facility: CLINIC | Age: 28
End: 2023-06-22
Payer: MEDICARE

## 2023-06-22 DIAGNOSIS — E10.3541: Primary | ICD-10-CM

## 2023-06-22 DIAGNOSIS — E10.3592 TYPE 1 DIABETES MELLITUS WITH PROLIFERATIVE RETINOPATHY OF LEFT EYE WITHOUT MACULAR EDEMA: ICD-10-CM

## 2023-06-22 LAB
BACTERIA BLD CULT: NORMAL
BACTERIA BLD CULT: NORMAL

## 2023-06-22 PROCEDURE — 92134 CPTRZ OPH DX IMG PST SGM RTA: CPT | Mod: S$GLB,,, | Performed by: OPHTHALMOLOGY

## 2023-06-22 PROCEDURE — 3044F HG A1C LEVEL LT 7.0%: CPT | Mod: CPTII,S$GLB,, | Performed by: OPHTHALMOLOGY

## 2023-06-22 PROCEDURE — 1159F MED LIST DOCD IN RCRD: CPT | Mod: CPTII,S$GLB,, | Performed by: OPHTHALMOLOGY

## 2023-06-22 PROCEDURE — 3066F PR DOCUMENTATION OF TREATMENT FOR NEPHROPATHY: ICD-10-PCS | Mod: CPTII,S$GLB,, | Performed by: OPHTHALMOLOGY

## 2023-06-22 PROCEDURE — 1160F RVW MEDS BY RX/DR IN RCRD: CPT | Mod: CPTII,S$GLB,, | Performed by: OPHTHALMOLOGY

## 2023-06-22 PROCEDURE — 99999 PR PBB SHADOW E&M-EST. PATIENT-LVL III: CPT | Mod: PBBFAC,,, | Performed by: OPHTHALMOLOGY

## 2023-06-22 PROCEDURE — 1159F PR MEDICATION LIST DOCUMENTED IN MEDICAL RECORD: ICD-10-PCS | Mod: CPTII,S$GLB,, | Performed by: OPHTHALMOLOGY

## 2023-06-22 PROCEDURE — 92201 PR OPHTHALMOSCOPY, EXT, W/RET DRAW/SCLERAL DEPR, I&R, UNI/BI: ICD-10-PCS | Mod: S$GLB,,, | Performed by: OPHTHALMOLOGY

## 2023-06-22 PROCEDURE — 1160F PR REVIEW ALL MEDS BY PRESCRIBER/CLIN PHARMACIST DOCUMENTED: ICD-10-PCS | Mod: CPTII,S$GLB,, | Performed by: OPHTHALMOLOGY

## 2023-06-22 PROCEDURE — 99214 OFFICE O/P EST MOD 30 MIN: CPT | Mod: S$GLB,,, | Performed by: OPHTHALMOLOGY

## 2023-06-22 PROCEDURE — 92201 OPSCPY EXTND RTA DRAW UNI/BI: CPT | Mod: S$GLB,,, | Performed by: OPHTHALMOLOGY

## 2023-06-22 PROCEDURE — 92134 POSTERIOR SEGMENT OCT RETINA (OCULAR COHERENCE TOMOGRAPHY)-BOTH EYES: ICD-10-PCS | Mod: S$GLB,,, | Performed by: OPHTHALMOLOGY

## 2023-06-22 PROCEDURE — 3066F NEPHROPATHY DOC TX: CPT | Mod: CPTII,S$GLB,, | Performed by: OPHTHALMOLOGY

## 2023-06-22 PROCEDURE — 99214 PR OFFICE/OUTPT VISIT, EST, LEVL IV, 30-39 MIN: ICD-10-PCS | Mod: S$GLB,,, | Performed by: OPHTHALMOLOGY

## 2023-06-22 PROCEDURE — 3044F PR MOST RECENT HEMOGLOBIN A1C LEVEL <7.0%: ICD-10-PCS | Mod: CPTII,S$GLB,, | Performed by: OPHTHALMOLOGY

## 2023-06-22 PROCEDURE — 99999 PR PBB SHADOW E&M-EST. PATIENT-LVL III: ICD-10-PCS | Mod: PBBFAC,,, | Performed by: OPHTHALMOLOGY

## 2023-06-23 ENCOUNTER — TELEPHONE (OUTPATIENT)
Dept: UROLOGY | Facility: CLINIC | Age: 28
End: 2023-06-23
Payer: MEDICARE

## 2023-06-23 NOTE — TELEPHONE ENCOUNTER
Scheduled for f/u with established provider.    ----- Message from Sarah Bhat MD sent at 6/21/2023  1:03 PM CDT -----  If patient has an established relationship with urology and wants to see urology she should stay with urology. I don't think she needs both us and them. It will end up being conflicting.   ----- Message -----  From: Aracely Greene RN  Sent: 6/21/2023  10:19 AM CDT  To: Sarah Bhat MD    Hey! We spoke about this patient previously- She has recurrent UTIs and a kidney transplant and is established with [facility]. Patient would like to follow up with urology, but for the discharging physician would like her to see urogyn. Should we keep her on the schedule or redirect her to urology?

## 2023-06-23 NOTE — PROGRESS NOTES
Subjective:       Patient ID: Isabela Read is a 28 y.o. female      Chief Complaint   Patient presents with    Follow-up     F/jodi STEEN as planned     History of Present Illness  HPI     Follow-up     Additional comments: KAYDEN/jodi STEEN as planned           Comments    DLS 05/11/2023 BY Dr Sabra MD    1 MONTH OCT //DME OU CK    Va OD blurry, Va OS good.  No floaters OU.  No pain    Gtts:  PF 1/0            Last edited by Maximilian Montaño MD on 6/22/2023 10:45 PM.        Imaging:    See report    Assessment/Plan:     1. 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  Has room for more PRP  Eval with IVFA transit OS, wide field next month.  Consider PRP depending upon FA    Diabetic Retinopathy discussed in detail, all questions answered  Stressed importance of good BS/BP/Chol Control  RTC immediately PRN any vision changes, laure blurry vision, missing vision, floaters, distortions, etc    - Posterior Segment OCT Retina-Both eyes    Follow up in about 1 month (around 7/22/2023), or if symptoms worsen or fail to improve, for IVFA Left Transit, Dilated examination. OPTOS FA

## 2023-06-26 ENCOUNTER — LAB VISIT (OUTPATIENT)
Dept: LAB | Facility: HOSPITAL | Age: 28
End: 2023-06-26
Attending: INTERNAL MEDICINE
Payer: MEDICARE

## 2023-06-26 ENCOUNTER — PATIENT MESSAGE (OUTPATIENT)
Dept: TRANSPLANT | Facility: CLINIC | Age: 28
End: 2023-06-26
Payer: MEDICARE

## 2023-06-26 DIAGNOSIS — Z94.83 STATUS POST PANCREAS TRANSPLANTATION: ICD-10-CM

## 2023-06-26 DIAGNOSIS — Z94.0 KIDNEY REPLACED BY TRANSPLANT: ICD-10-CM

## 2023-06-26 DIAGNOSIS — Z94.83 PANCREAS REPLACED BY TRANSPLANT: ICD-10-CM

## 2023-06-26 DIAGNOSIS — Z94.0 STATUS POST SIMULTANEOUS KIDNEY AND PANCREAS TRANSPLANT: ICD-10-CM

## 2023-06-26 DIAGNOSIS — Z94.83 STATUS POST SIMULTANEOUS KIDNEY AND PANCREAS TRANSPLANT: ICD-10-CM

## 2023-06-26 LAB
ALBUMIN SERPL BCP-MCNC: 3.6 G/DL (ref 3.5–5.2)
AMYLASE SERPL-CCNC: 94 U/L (ref 20–110)
ANION GAP SERPL CALC-SCNC: 12 MMOL/L (ref 8–16)
BASOPHILS # BLD AUTO: 0.04 K/UL (ref 0–0.2)
BASOPHILS # BLD AUTO: 0.04 K/UL (ref 0–0.2)
BASOPHILS NFR BLD: 0.3 % (ref 0–1.9)
BASOPHILS NFR BLD: 0.3 % (ref 0–1.9)
BUN SERPL-MCNC: 24 MG/DL (ref 6–20)
CALCIUM SERPL-MCNC: 10.6 MG/DL (ref 8.7–10.5)
CHLORIDE SERPL-SCNC: 110 MMOL/L (ref 95–110)
CO2 SERPL-SCNC: 21 MMOL/L (ref 23–29)
CREAT SERPL-MCNC: 0.9 MG/DL (ref 0.5–1.4)
DIFFERENTIAL METHOD: ABNORMAL
DIFFERENTIAL METHOD: ABNORMAL
EOSINOPHIL # BLD AUTO: 0.1 K/UL (ref 0–0.5)
EOSINOPHIL # BLD AUTO: 0.1 K/UL (ref 0–0.5)
EOSINOPHIL NFR BLD: 0.9 % (ref 0–8)
EOSINOPHIL NFR BLD: 0.9 % (ref 0–8)
ERYTHROCYTE [DISTWIDTH] IN BLOOD BY AUTOMATED COUNT: 13.3 % (ref 11.5–14.5)
ERYTHROCYTE [DISTWIDTH] IN BLOOD BY AUTOMATED COUNT: 13.3 % (ref 11.5–14.5)
EST. GFR  (NO RACE VARIABLE): >60 ML/MIN/1.73 M^2
GLUCOSE SERPL-MCNC: 87 MG/DL (ref 70–110)
HCT VFR BLD AUTO: 37.8 % (ref 37–48.5)
HCT VFR BLD AUTO: 37.8 % (ref 37–48.5)
HGB BLD-MCNC: 12.1 G/DL (ref 12–16)
HGB BLD-MCNC: 12.1 G/DL (ref 12–16)
IMM GRANULOCYTES # BLD AUTO: 0.1 K/UL (ref 0–0.04)
IMM GRANULOCYTES # BLD AUTO: 0.1 K/UL (ref 0–0.04)
IMM GRANULOCYTES NFR BLD AUTO: 0.7 % (ref 0–0.5)
IMM GRANULOCYTES NFR BLD AUTO: 0.7 % (ref 0–0.5)
LIPASE SERPL-CCNC: 16 U/L (ref 4–60)
LYMPHOCYTES # BLD AUTO: 1.3 K/UL (ref 1–4.8)
LYMPHOCYTES # BLD AUTO: 1.3 K/UL (ref 1–4.8)
LYMPHOCYTES NFR BLD: 8.5 % (ref 18–48)
LYMPHOCYTES NFR BLD: 8.5 % (ref 18–48)
MCH RBC QN AUTO: 30.4 PG (ref 27–31)
MCH RBC QN AUTO: 30.4 PG (ref 27–31)
MCHC RBC AUTO-ENTMCNC: 32 G/DL (ref 32–36)
MCHC RBC AUTO-ENTMCNC: 32 G/DL (ref 32–36)
MCV RBC AUTO: 95 FL (ref 82–98)
MCV RBC AUTO: 95 FL (ref 82–98)
MONOCYTES # BLD AUTO: 0.8 K/UL (ref 0.3–1)
MONOCYTES # BLD AUTO: 0.8 K/UL (ref 0.3–1)
MONOCYTES NFR BLD: 5.1 % (ref 4–15)
MONOCYTES NFR BLD: 5.1 % (ref 4–15)
NEUTROPHILS # BLD AUTO: 12.5 K/UL (ref 1.8–7.7)
NEUTROPHILS # BLD AUTO: 12.5 K/UL (ref 1.8–7.7)
NEUTROPHILS NFR BLD: 84.5 % (ref 38–73)
NEUTROPHILS NFR BLD: 84.5 % (ref 38–73)
NRBC BLD-RTO: 0 /100 WBC
NRBC BLD-RTO: 0 /100 WBC
PHOSPHATE SERPL-MCNC: 3.1 MG/DL (ref 2.7–4.5)
PLATELET # BLD AUTO: 326 K/UL (ref 150–450)
PLATELET # BLD AUTO: 326 K/UL (ref 150–450)
PMV BLD AUTO: 10.6 FL (ref 9.2–12.9)
PMV BLD AUTO: 10.6 FL (ref 9.2–12.9)
POTASSIUM SERPL-SCNC: 4.1 MMOL/L (ref 3.5–5.1)
RBC # BLD AUTO: 3.98 M/UL (ref 4–5.4)
RBC # BLD AUTO: 3.98 M/UL (ref 4–5.4)
SODIUM SERPL-SCNC: 143 MMOL/L (ref 136–145)
TACROLIMUS BLD-MCNC: 12.8 NG/ML (ref 5–15)
WBC # BLD AUTO: 14.82 K/UL (ref 3.9–12.7)
WBC # BLD AUTO: 14.82 K/UL (ref 3.9–12.7)

## 2023-06-26 PROCEDURE — 82150 ASSAY OF AMYLASE: CPT | Performed by: INTERNAL MEDICINE

## 2023-06-26 PROCEDURE — 80197 ASSAY OF TACROLIMUS: CPT | Performed by: INTERNAL MEDICINE

## 2023-06-26 PROCEDURE — 36415 COLL VENOUS BLD VENIPUNCTURE: CPT | Performed by: INTERNAL MEDICINE

## 2023-06-26 PROCEDURE — 85025 COMPLETE CBC W/AUTO DIFF WBC: CPT | Performed by: INTERNAL MEDICINE

## 2023-06-26 PROCEDURE — 83690 ASSAY OF LIPASE: CPT | Performed by: INTERNAL MEDICINE

## 2023-06-26 PROCEDURE — 80069 RENAL FUNCTION PANEL: CPT | Performed by: INTERNAL MEDICINE

## 2023-06-26 RX ORDER — TACROLIMUS 1 MG/1
4 CAPSULE ORAL EVERY 12 HOURS
Qty: 300 CAPSULE | Refills: 11 | Status: SHIPPED | OUTPATIENT
Start: 2023-06-26 | End: 2023-07-10 | Stop reason: DRUGHIGH

## 2023-06-26 NOTE — TELEPHONE ENCOUNTER
Message sent to patient reiterating prograf dose change.    ----- Message from Tammie Broussard DO sent at 6/26/2023  4:05 PM CDT -----  Near baseline kidney and pancreas graft function. Increase Po hydration  Elevated WBC but talked to patient and she is stating that she is feeling well.   High FK level. Hold FK dose tonight and decrease FK to 4 mg BID (told patient when I was talking to her)  CMV level pending

## 2023-06-28 ENCOUNTER — OFFICE VISIT (OUTPATIENT)
Dept: SPINE | Facility: CLINIC | Age: 28
End: 2023-06-28
Payer: MEDICARE

## 2023-06-28 VITALS
SYSTOLIC BLOOD PRESSURE: 96 MMHG | WEIGHT: 152.13 LBS | HEART RATE: 112 BPM | HEIGHT: 64 IN | RESPIRATION RATE: 17 BRPM | DIASTOLIC BLOOD PRESSURE: 65 MMHG | OXYGEN SATURATION: 100 % | BODY MASS INDEX: 25.97 KG/M2

## 2023-06-28 DIAGNOSIS — R20.2 NUMBNESS AND TINGLING OF FOOT: ICD-10-CM

## 2023-06-28 DIAGNOSIS — R20.2 PARESTHESIA OF HAND, BILATERAL: Primary | ICD-10-CM

## 2023-06-28 DIAGNOSIS — R20.0 NUMBNESS AND TINGLING OF FOOT: ICD-10-CM

## 2023-06-28 PROCEDURE — 1160F PR REVIEW ALL MEDS BY PRESCRIBER/CLIN PHARMACIST DOCUMENTED: ICD-10-PCS | Mod: CPTII,S$GLB,, | Performed by: NURSE PRACTITIONER

## 2023-06-28 PROCEDURE — 3074F SYST BP LT 130 MM HG: CPT | Mod: CPTII,S$GLB,, | Performed by: NURSE PRACTITIONER

## 2023-06-28 PROCEDURE — 3044F PR MOST RECENT HEMOGLOBIN A1C LEVEL <7.0%: ICD-10-PCS | Mod: CPTII,S$GLB,, | Performed by: NURSE PRACTITIONER

## 2023-06-28 PROCEDURE — 3078F DIAST BP <80 MM HG: CPT | Mod: CPTII,S$GLB,, | Performed by: NURSE PRACTITIONER

## 2023-06-28 PROCEDURE — 3074F PR MOST RECENT SYSTOLIC BLOOD PRESSURE < 130 MM HG: ICD-10-PCS | Mod: CPTII,S$GLB,, | Performed by: NURSE PRACTITIONER

## 2023-06-28 PROCEDURE — 99204 PR OFFICE/OUTPT VISIT, NEW, LEVL IV, 45-59 MIN: ICD-10-PCS | Mod: S$GLB,,, | Performed by: NURSE PRACTITIONER

## 2023-06-28 PROCEDURE — 1111F PR DISCHARGE MEDS RECONCILED W/ CURRENT OUTPATIENT MED LIST: ICD-10-PCS | Mod: CPTII,S$GLB,, | Performed by: NURSE PRACTITIONER

## 2023-06-28 PROCEDURE — 3044F HG A1C LEVEL LT 7.0%: CPT | Mod: CPTII,S$GLB,, | Performed by: NURSE PRACTITIONER

## 2023-06-28 PROCEDURE — 99204 OFFICE O/P NEW MOD 45 MIN: CPT | Mod: S$GLB,,, | Performed by: NURSE PRACTITIONER

## 2023-06-28 PROCEDURE — 3066F NEPHROPATHY DOC TX: CPT | Mod: CPTII,S$GLB,, | Performed by: NURSE PRACTITIONER

## 2023-06-28 PROCEDURE — 1159F MED LIST DOCD IN RCRD: CPT | Mod: CPTII,S$GLB,, | Performed by: NURSE PRACTITIONER

## 2023-06-28 PROCEDURE — 1159F PR MEDICATION LIST DOCUMENTED IN MEDICAL RECORD: ICD-10-PCS | Mod: CPTII,S$GLB,, | Performed by: NURSE PRACTITIONER

## 2023-06-28 PROCEDURE — 3008F PR BODY MASS INDEX (BMI) DOCUMENTED: ICD-10-PCS | Mod: CPTII,S$GLB,, | Performed by: NURSE PRACTITIONER

## 2023-06-28 PROCEDURE — 1111F DSCHRG MED/CURRENT MED MERGE: CPT | Mod: CPTII,S$GLB,, | Performed by: NURSE PRACTITIONER

## 2023-06-28 PROCEDURE — 3066F PR DOCUMENTATION OF TREATMENT FOR NEPHROPATHY: ICD-10-PCS | Mod: CPTII,S$GLB,, | Performed by: NURSE PRACTITIONER

## 2023-06-28 PROCEDURE — 3008F BODY MASS INDEX DOCD: CPT | Mod: CPTII,S$GLB,, | Performed by: NURSE PRACTITIONER

## 2023-06-28 PROCEDURE — 99999 PR PBB SHADOW E&M-EST. PATIENT-LVL IV: ICD-10-PCS | Mod: PBBFAC,,, | Performed by: NURSE PRACTITIONER

## 2023-06-28 PROCEDURE — 1160F RVW MEDS BY RX/DR IN RCRD: CPT | Mod: CPTII,S$GLB,, | Performed by: NURSE PRACTITIONER

## 2023-06-28 PROCEDURE — 99999 PR PBB SHADOW E&M-EST. PATIENT-LVL IV: CPT | Mod: PBBFAC,,, | Performed by: NURSE PRACTITIONER

## 2023-06-28 PROCEDURE — 3078F PR MOST RECENT DIASTOLIC BLOOD PRESSURE < 80 MM HG: ICD-10-PCS | Mod: CPTII,S$GLB,, | Performed by: NURSE PRACTITIONER

## 2023-06-28 NOTE — PROGRESS NOTES
Subjective:     Patient ID: Isabela Read is a 28 y.o. female.    Chief Complaint: No chief complaint on file.    HPI Ms. Read is a 28 year old female here for evaluation of numbness/parasthesia to the hands and feet.    Hx neuropathy, kidney transplant  C/o numbness/tinglingin her hands and shooting pains in her feet  EMG/NCS ordered by Dr. Guerra scheduled on 7/26  Denies neck or back pain    Past Medical History:   Diagnosis Date    Acute cystitis without hematuria 11/25/2022    Acute kidney injury superimposed on chronic kidney disease 4/7/2021    Anemia     Anemia in ESRD (end-stage renal disease) 7/29/2020    Lab Results  Component Value Date   WBC 7.23 07/29/2020   HGB 7.1 (L) 07/29/2020   HCT 22.2 (L) 07/29/2020   MCV 91 07/29/2020    (H) 07/29/2020   Following with hematology and scheduled to undergo iron infusions    Bacteremia due to Escherichia coli 1/23/2023    Bilious vomiting with nausea 12/12/2022    Chronic hypertension with exacerbation during pregnancy in second trimester 11/6/2020    Current regimen (11/6/20):  - Carvedilol 12.5 mg BID - Nifedipine 30 mg daily Baseline CKD + proteinuria    Chronic kidney disease     Depression     Diabetes mellitus     Diabetic retinopathy     Diarrhea     Encounter for blood transfusion     End stage renal failure on dialysis     Fever 12/1/2022    Fever of undetermined origin 12/12/2022    Gastroparesis     Glaucoma     Hepatomegaly 4/29/2021    History of diabetes mellitus, type I 12/20/2022    Hx of psychiatric care     Hyperlipidemia     Hypertension     Nausea and vomiting 12/12/2022    Nephrotic syndrome     Nephrotic syndrome 3/4/2021    Nonspecific reaction to tuberculin skin test without active tuberculosis 10/1/2021    Orthostatic hypotension 1/25/2023    Palpitations     Poor fetal growth affecting management of mother in second trimester 10/15/2020    Pyelonephritis, acute 11/26/2022    Restrictive lung disease     Retinal detachment      Sepsis 12/1/2022    Sepsis 11/25/2022    Sepsis due to Escherichia coli 1/23/2023    Severe pre-eclampsia in second trimester 11/6/2020    Severe sepsis 11/25/2022    SIRS (systemic inflammatory response syndrome) 12/6/2022    Status post pancreas transplantation 11/26/2022 11/2/2022    Type 1 diabetes mellitus with end-stage renal disease (ESRD) 2/24/2015    Followed by Dr. Mayo.  Last A1C was 13  Currently on lantus 20 units qAM and humolog 10 units with meals  - a fetal echocardiogram around 22-24 weeks - needs eye exam, podiatry exam  - needs EKG, maternal echo and fu with cardiology  - ASA 81mg, folic acid 4mg PO daily - hemoglobin A1c every 4-6 weeks -24 hour urine for protein and creatinine clearance should be performed. Patient will also need a       Past Surgical History:   Procedure Laterality Date    AV FISTULA PLACEMENT Left 04/07/2021    Procedure: CREATION, AV FISTULA;  Surgeon: Roberto Ryan MD;  Location: [facility] OR [address] [place] [address];  Service: Peripheral Vascular;  Laterality: Left;    COLONOSCOPY N/A 03/16/2022    Procedure: COLONOSCOPY;  Surgeon: Tavo Kwok MD;  Location: [facility] (4TH FLR);  Service: Endoscopy;  Laterality: N/A;  Questionable history of delayed gastric emptying longstanding diabetes now on eating pre transplant workup for history of nausea vomiting which seems to have improved with dialysis also chronic diarrhea and history of anemia pre transplant workup for kidney transplant. 3    CYSTOSCOPY      ESOPHAGOGASTRODUODENOSCOPY N/A 03/16/2022    Procedure: EGD (ESOPHAGOGASTRODUODENOSCOPY);  Surgeon: Tavo Kwok MD;  Location: [facility] ENDO (4TH FLR);  Service: Endoscopy;  Laterality: N/A;  Questionable history of delayed gastric emptying longstanding diabetes now on eating pre transplant workup for history of nausea vomiting which seems to have improved with dialysis also chronic diarrhea and history of anemia pre transplant workup for    FISTULOGRAM N/A 08/11/2021     Procedure: Fistulogram;  Surgeon: Roberto Ryan MD;  Location: [facility] CATH LAB;  Service: Cardiology;  Laterality: N/A;    KIDNEY TRANSPLANT Right 11/02/2022    Procedure: TRANSPLANT, KIDNEY;  Surgeon: Kumar Rodriges Jr., MD;  Location: [facility] OR 2ND FLR;  Service: Transplant;  Laterality: Right;    LASER PHOTOCOAGULATION OF RETINA Right 05/31/2022    Procedure: PHOTOCOAGULATION, RETINA, USING LASER;  Surgeon: Maximilian Montaño MD;  Location: [facility] OR 1ST FLR;  Service: Ophthalmology;  Laterality: Right;    PERCUTANEOUS TRANSLUMINAL ANGIOPLASTY OF ARTERIOVENOUS FISTULA N/A 08/11/2021    Procedure: PTA, AV FISTULA;  Surgeon: Roberto Ryan MD;  Location: [facility] CATH LAB;  Service: Cardiology;  Laterality: N/A;    REMOVAL IMPLANT, POSTERIOR SEGMENT, INTRAOCULAR Right 02/01/2022    Procedure: REMOVAL IMPLANT, POSTERIOR SEGMENT, INTRAOCULAR;  Surgeon: Maximilian Montaño MD;  [facility];  Service: Ophthalmology;  Laterality: Right;    REPAIR OF RETINAL DETACHMENT WITH VITRECTOMY Right 01/25/2022    Procedure: REPAIR, RETINAL DETACHMENT, WITH VITRECTOMY, MEMBRANE PEEL, LASER, INJECTION OF GAS VS OIL;  Surgeon: Maximilian Montaño MD;  Location: [facility] OR 1ST FLR;  Service: Ophthalmology;  Laterality: Right;    REPAIR, RETINAL DETACHMENT, COMPLEX, WITH VITRECTOMY AND MEMBRANE PEELING Right 3/21/2023    Procedure: REPAIR,RETINAL DETACHMENT,COMPLEX,WITH VITRECTOMY AND MEMBRANE PEELING;  Surgeon: Maximilian Montaño MD;  Location: [facility] OR 1ST FLR;  Service: Ophthalmology;  Laterality: Right;  25ga    REVISION OF ARTERIOVENOUS FISTULA Left 12/10/2021    Procedure: REVISION, AV FISTULA with BVT;  Surgeon: Roberto Ryan MD;  Location: [facility] OR 2ND FLR;  Service: Peripheral Vascular;  Laterality: Left;    TRANSPLANTATION OF PANCREAS N/A 11/02/2022    Procedure: TRANSPLANT, PANCREAS;  Surgeon: Kumar Rodriges Jr., MD;  Location: [facility] OR 2ND FLR;  Service: Transplant;  Laterality: N/A;     VITRECTOMY BY PARS PLANA APPROACH Right 02/01/2022    Procedure: VITRECTOMY, PARS PLANA APPROACH;  Surgeon: Maximilian Montaño MD;  Location: [facility] OR 1ST FLR;  Service: Ophthalmology;  Laterality: Right;    VITRECTOMY BY PARS PLANA APPROACH Right 05/31/2022    Procedure: VITRECTOMY, PARS PLANA APPROACH;  Surgeon: Maximilian Montaño MD;  Location: [facility] OR 1ST FLR;  Service: Ophthalmology;  Laterality: Right;       Family History   Problem Relation Age of Onset    Hypertension Mother     Heart disease Father     Diabetes Brother     Celiac disease Neg Hx     Cirrhosis Neg Hx     Colon cancer Neg Hx     Colon polyps Neg Hx     Crohn's disease Neg Hx     Inflammatory bowel disease Neg Hx     Liver cancer Neg Hx     Liver disease Neg Hx     Rectal cancer Neg Hx     Stomach cancer Neg Hx     Ulcerative colitis Neg Hx     Esophageal cancer Neg Hx     Hemochromatosis Neg Hx     Pancreatic cancer Neg Hx     Kidney cancer Neg Hx     Bladder Cancer Neg Hx     Uterine cancer Neg Hx     Ovarian cancer Neg Hx        Social History     Socioeconomic History    Marital status: Single   Occupational History    Occupation:  at [employer]   Tobacco Use    Smoking status: Never    Smokeless tobacco: Never   Substance and Sexual Activity    Alcohol use: No    Drug use: No    Sexual activity: Not Currently     Partners: Male     Comment: monogamous   Social History Narrative    Caregiver        Single    No kids    Had Miscarriage  At 23 weeks from preeclampsia    Disabled       Current Outpatient Medications   Medication Sig Dispense Refill    aspirin (ECOTRIN) 81 MG EC tablet Take 81 mg by mouth once daily.      ergocalciferol (ERGOCALCIFEROL) 50,000 unit Cap Take 1 capsule (50,000 Units total) by mouth every 7 days. 4 capsule 5    ketoconazole (NIZORAL) 2 % cream Apply topically 2 (two) times daily as needed for dry areas of face 60 g 1    medroxyPROGESTERone (DEPO-PROVERA) 150 mg/mL Syrg Inject 1 mL (150 mg total) into the  muscle once. for 1 dose 1 mL 3    mycophenolate (CELLCEPT) 250 mg Cap Take 2 capsules (500 mg total) by mouth 2 (two) times daily. 120 capsule 11    predniSONE (DELTASONE) 5 MG tablet Take 1 tablet (5 mg total) by mouth once daily. 30 tablet 11    promethazine (PHENERGAN) 12.5 MG Tab Take 1 tablet (12.5 mg total) by mouth every 6 (six) hours as needed (nausea). 30 tablet 1    rosuvastatin (CRESTOR) 10 MG tablet Take 1 tablet (10 mg total) by mouth every evening. 90 tablet 0    sodium bicarbonate 650 MG tablet Take 2 tablets (1,300 mg total) by mouth 3 (three) times daily. 180 tablet 11    tacrolimus (PROGRAF) 1 MG Cap Take 4 capsules (4 mg total) by mouth every 12 (twelve) hours. 300 capsule 11    tretinoin (RETIN-A) 0.05 % cream Apply topically every evening. Start with every other night and move up to nightly after 2 weeks if not too dry. 20 g 1     No current facility-administered medications for this visit.       Review of patient's allergies indicates:  No Known Allergies      Review of Systems   Constitutional: Negative for fever.   Cardiovascular:  Negative for chest pain.   Respiratory:  Negative for shortness of breath.    Musculoskeletal:  Negative for back pain and neck pain.   Gastrointestinal:  Negative for abdominal pain, bowel incontinence, nausea and vomiting.   Genitourinary:  Negative for bladder incontinence.   Neurological:  Positive for numbness and paresthesias.      Objective:     General: Isabela is well-developed, well-nourished, appears stated age, in no acute distress, alert and oriented to time, place and person.     General    Vitals reviewed.  Constitutional: She is oriented to person, place, and time. She appears well-developed and well-nourished.   HENT:   Head: Atraumatic.   Nose: Nose normal.   Eyes: Conjunctivae are normal.   Cardiovascular:  Normal rate.            Pulmonary/Chest: Effort normal.   Neurological: She is alert and oriented to person, place, and time.   Psychiatric:  She has a normal mood and affect. Her behavior is normal. Judgment and thought content normal.     General Musculoskeletal Exam   Gait: normal     Back (L-Spine & T-Spine) / Neck (C-Spine) Exam     Back (L-Spine & T-Spine) Range of Motion   Extension:  10   Flexion:  90   Lateral bend right:  10   Lateral bend left:  10     Neck (C-Spine) Range of Motion   Flexion:      Normal  Extension:  Normal  Right Lateral Bend: normal  Left Lateral Bend: normal  Right Rotation: normal  Left Rotation: normal    Spinal Sensation   Right Side Sensation  C-Spine Level: normal   Left Side Sensation  C-Spine Level: normal    Other   She has no scoliosis .  Spinal Kyphosis:  Absent    Comments:  Decreased sensation on the right      Muscle Strength   Right Upper Extremity   Biceps: 5/5   Deltoid:  5/5  Triceps:  5/5  : 5/5   Finger Extensors:  5/5  Left Upper Extremity  Biceps: 5/5   Deltoid:  5/5  Triceps:  5/5  :  5/5   Finger Extensors:  5/5  Right Lower Extremity   Hip Flexion: 5/5   Quadriceps:  5/5   Ankle Dorsiflexion:  5/5   Anterior tibial:  5/5   EHL:  5/5  Left Lower Extremity   Hip Flexion: 5/5   Quadriceps:  5/5   Ankle Dorsiflexion:  5/5   Anterior tibial:  5/5   EHL:  5/5    Reflexes     Left Side  Biceps:  2+  Brachioradialis:  2+  Achilles:  2+  Left Palm's Sign:  Absent    Right Side   Biceps:  2+  Brachioradialis:  2+  Achilles:  2+  Right Palm's Sign:  absent    Vascular Exam     Right Pulses        Carotid:                  2+    Left Pulses        Carotid:                  2+        Assessment:     1. Paresthesia of hand, bilateral    2. Numbness and tingling of foot         Plan:        Prior records reviewed today  I do not suspect the spine is culprit here, but well start with cervical and lumbar xrays  EMG scheduled for 7/26. Will followup via My chart after EMG  We discussed posture sitting and the importance of trying to sit better.    We discussed the benefits of therapy and exercise and  continuing to move.     More than 50% of the total time  of 45 minutes was spent face to face in counseling on diagnosis and treatment options. I also counseled patient  on common and most usual side effect of prescribed medications.  I reviewed Primary care , and other specialty's notes to better coordinate patient's care. All questions were answered, and patient voiced understanding.      Follow-up: Follow up Results via My Chart. If there are any questions prior to this, the patient was instructed to contact the office.

## 2023-07-05 ENCOUNTER — PROCEDURE VISIT (OUTPATIENT)
Dept: NEUROLOGY | Facility: CLINIC | Age: 28
End: 2023-07-05
Payer: MEDICARE

## 2023-07-05 DIAGNOSIS — G56.03 CARPAL TUNNEL SYNDROME, BILATERAL: ICD-10-CM

## 2023-07-05 PROCEDURE — 95912 NRV CNDJ TEST 11-12 STUDIES: CPT | Mod: S$GLB,,, | Performed by: PHYSICAL MEDICINE & REHABILITATION

## 2023-07-05 PROCEDURE — 95886 PR EMG COMPLETE, W/ NERVE CONDUCTION STUDIES, 5+ MUSCLES: ICD-10-PCS | Mod: S$GLB,,, | Performed by: PHYSICAL MEDICINE & REHABILITATION

## 2023-07-05 PROCEDURE — 95886 MUSC TEST DONE W/N TEST COMP: CPT | Mod: S$GLB,,, | Performed by: PHYSICAL MEDICINE & REHABILITATION

## 2023-07-05 PROCEDURE — 95912 PR NERVE CONDUCTION STUDY; 11 -12 STUDIES: ICD-10-PCS | Mod: S$GLB,,, | Performed by: PHYSICAL MEDICINE & REHABILITATION

## 2023-07-05 NOTE — PROCEDURES
Test Date:  2023    Patient: Parul Read : 1995 Physician: Jose Brooks D.O.   ID#: [id number] SEX: Female Ref. Phys: Lola Guerra MD     HPI:  Isabela Read is a 28 y.o.female who presents for NCS/EMG to evaluate CTS bilaterally.  Hx of DM, s/p kidney and pancreas transplant.        NCV & EMG Findings:  Evaluation of the left Ulnar (ADM) motor nerve showed reduced amplitude, decreased conduction velocity (Abv Elbow-Bel Elbow), and decreased conduction velocity (Abv Elbow-Bel Elbow).  The left Dorsal Ulnar Cutaneous sensory and the left ulnar sensory nerves showed no response.  The left median sensory and the right median sensory nerves showed prolonged distal peak latency, reduced amplitude, and decreased conduction velocity.  All remaining nerves (as indicated in the following tables) were within normal limits.  All examined muscles (as indicated in the following table) showed no evidence of electrical instability.  Note: Unable to check left upper extremity with needle EMG due to AV fistula present.      Impression:  There is electrophysiologic evidence of a bilateral sensory median mononeuropathy across the wrist (I.e. Carpal tunnel syndrome).  There is no motor axonal loss.  There is no active denervation.  This is graded as Mild in severity bilaterally.    There is evidence of a left ulnar neuropathy, likely at/near the elbow (such as would be seen in cubital tunnel syndrome).  There is focal demyelination around the elbow and absent ulnar sensory and KRISTOPHER sensory responses.  Unable to needle the left arm due to AV fistula.  This is graded as at least moderate in severity.    Lastly, the sensory>motor responses are all borderline in amplitude.  In her age, we would expect more robust responses.  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  She does have n/t of the feet, and so an order for NCS/EMG of the lower  extremities may help better elucidate this.        ___________________________  Jose Brooks D.O.        NCS+  Motor Nerve Results      Latency Amplitude F-Lat Segment Distance CV Comment   Site (ms) Norm (mV) Norm (ms)  (cm) (m/s) Norm    Left Median (APB)   Wrist 3.8  < 4.4 5.9  > 5.9  Wrist-Palm - - -    Elbow 9.2 - 5.0 -  Elbow-Wrist 29 54  > 53    Right Median (APB)   Wrist 4.4  < 4.4 6.5  > 5.9  Wrist-Palm - - -    Elbow 8.9 - 5.2 -  Elbow-Wrist 24 53  > 53    Left Ulnar (ADM)   Wrist 3.3  < 3.7 *1.52  > 3.0         Bel Elbow 7.0 - 0.35 -  Bel Elbow-Wrist 24 65  > 52    Abv Elbow 9.8 - 0.36 -  Abv Elbow-Bel Elbow 10 *36  > 43    Left Ulnar (FDI)   Wrist 4.1 - 0.35 -         Bel Elbow 8.7 - 0.16 -  Bel Elbow-Wrist 24 52  > 52    Abv Elbow 11.6 - 0.20 -  Abv Elbow-Bel Elbow 10 *34  > 43    Right Ulnar (ADM)   Wrist 3.3  < 3.7 4.7  > 3.0         Bel Elbow 8.2 - 4.9 -  Bel Elbow-Wrist 26 53  > 52    Abv Elbow 9.7 - 5.0 -  Abv Elbow-Bel Elbow 10 67  > 43      Sensory Nerve Results      Latency (Peak) Amplitude (P-P) Segment Distance CV Comment   Site (ms) Norm (µV) Norm  (cm) (m/s) Norm    Left Median   Wrist-Dig II *4.3  < 4.0 *3  > 13 Wrist-Dig II 14 *33  > 39    Right Median   Wrist-Dig II *4.3  < 4.0 *4  > 13 Wrist-Dig II 14 *33  > 39    Left Ulnar   Wrist-Dig V *NR  < 4.0 *NR  > 8 Wrist-Dig V 14 *NR  > 38    Right Ulnar   Wrist-Dig V 3.6  < 4.0 9  > 8 Wrist-Dig V 14 39  > 38    Left Dorsal Ulnar Cutaneous   Wrist-Dorsum 5th MC *NR - *NR - Wrist-Dorsum 5th MC - *NR -    Left Radial   Forearm-Wrist 1.88  < 2.8 11  > 11 Forearm-Wrist 10 53 -    Right Radial   Forearm-Wrist 1.95  < 2.8 12  > 11 Forearm-Wrist 10 51 -      EMG+     Side Muscle Nerve Root Ins Act Fibs Psw Amp Dur Poly Recrt Int Pat Comment   Right Deltoid Axillary C5-C6 Nml Nml Nml Nml Nml 0 Nml Nml    Right Biceps Musculocut C5-C6 Nml Nml Nml Nml Nml 0 Nml Nml    Right Triceps Radial C6-C8 Nml Nml Nml Nml Nml 0 Nml Nml    Right Pronator Teres  Median C6-C7 Nml Nml Nml Nml Nml 0 Nml Nml    Right APB Median C8-T1 Nml Nml Nml Nml Nml 0 Nml Nml            Waveforms:    Motor              Sensory

## 2023-07-10 ENCOUNTER — LAB VISIT (OUTPATIENT)
Dept: LAB | Facility: HOSPITAL | Age: 28
End: 2023-07-10
Attending: INTERNAL MEDICINE
Payer: MEDICARE

## 2023-07-10 ENCOUNTER — PATIENT MESSAGE (OUTPATIENT)
Dept: TRANSPLANT | Facility: CLINIC | Age: 28
End: 2023-07-10
Payer: MEDICARE

## 2023-07-10 DIAGNOSIS — E53.8 DEFICIENCY OF OTHER SPECIFIED B GROUP VITAMINS: ICD-10-CM

## 2023-07-10 DIAGNOSIS — Z94.83 STATUS POST SIMULTANEOUS KIDNEY AND PANCREAS TRANSPLANT: ICD-10-CM

## 2023-07-10 DIAGNOSIS — Z94.0 STATUS POST SIMULTANEOUS KIDNEY AND PANCREAS TRANSPLANT: ICD-10-CM

## 2023-07-10 DIAGNOSIS — Z94.83 STATUS POST PANCREAS TRANSPLANTATION: ICD-10-CM

## 2023-07-10 DIAGNOSIS — N18.9 ANEMIA OF RENAL DISEASE: Primary | ICD-10-CM

## 2023-07-10 DIAGNOSIS — Z94.0 KIDNEY REPLACED BY TRANSPLANT: ICD-10-CM

## 2023-07-10 DIAGNOSIS — D52.0 DIETARY FOLATE DEFICIENCY ANEMIA: ICD-10-CM

## 2023-07-10 DIAGNOSIS — D63.1 ANEMIA OF RENAL DISEASE: Primary | ICD-10-CM

## 2023-07-10 LAB
ALBUMIN SERPL BCP-MCNC: 3.3 G/DL (ref 3.5–5.2)
AMYLASE SERPL-CCNC: 116 U/L (ref 20–110)
ANION GAP SERPL CALC-SCNC: 5 MMOL/L (ref 8–16)
BASOPHILS # BLD AUTO: 0.03 K/UL (ref 0–0.2)
BASOPHILS NFR BLD: 0.6 % (ref 0–1.9)
BUN SERPL-MCNC: 23 MG/DL (ref 6–20)
CALCIUM SERPL-MCNC: 9.6 MG/DL (ref 8.7–10.5)
CHLORIDE SERPL-SCNC: 112 MMOL/L (ref 95–110)
CO2 SERPL-SCNC: 24 MMOL/L (ref 23–29)
CREAT SERPL-MCNC: 0.8 MG/DL (ref 0.5–1.4)
DIFFERENTIAL METHOD: ABNORMAL
EOSINOPHIL # BLD AUTO: 0.2 K/UL (ref 0–0.5)
EOSINOPHIL NFR BLD: 3.8 % (ref 0–8)
ERYTHROCYTE [DISTWIDTH] IN BLOOD BY AUTOMATED COUNT: 14.8 % (ref 11.5–14.5)
EST. GFR  (NO RACE VARIABLE): >60 ML/MIN/1.73 M^2
GLUCOSE SERPL-MCNC: 80 MG/DL (ref 70–110)
HCT VFR BLD AUTO: 30 % (ref 37–48.5)
HGB BLD-MCNC: 9.1 G/DL (ref 12–16)
IMM GRANULOCYTES # BLD AUTO: 0.04 K/UL (ref 0–0.04)
IMM GRANULOCYTES NFR BLD AUTO: 0.8 % (ref 0–0.5)
LIPASE SERPL-CCNC: 24 U/L (ref 4–60)
LYMPHOCYTES # BLD AUTO: 0.9 K/UL (ref 1–4.8)
LYMPHOCYTES NFR BLD: 18.7 % (ref 18–48)
MAGNESIUM SERPL-MCNC: 1.8 MG/DL (ref 1.6–2.6)
MCH RBC QN AUTO: 30.3 PG (ref 27–31)
MCHC RBC AUTO-ENTMCNC: 30.3 G/DL (ref 32–36)
MCV RBC AUTO: 100 FL (ref 82–98)
MONOCYTES # BLD AUTO: 0.7 K/UL (ref 0.3–1)
MONOCYTES NFR BLD: 14.1 % (ref 4–15)
NEUTROPHILS # BLD AUTO: 3.1 K/UL (ref 1.8–7.7)
NEUTROPHILS NFR BLD: 62 % (ref 38–73)
NRBC BLD-RTO: 0 /100 WBC
PHOSPHATE SERPL-MCNC: 3.1 MG/DL (ref 2.7–4.5)
PLATELET # BLD AUTO: 464 K/UL (ref 150–450)
PMV BLD AUTO: 10.2 FL (ref 9.2–12.9)
POTASSIUM SERPL-SCNC: 4.6 MMOL/L (ref 3.5–5.1)
RBC # BLD AUTO: 3 M/UL (ref 4–5.4)
SODIUM SERPL-SCNC: 141 MMOL/L (ref 136–145)
TACROLIMUS BLD-MCNC: 5.6 NG/ML (ref 5–15)
WBC # BLD AUTO: 5.02 K/UL (ref 3.9–12.7)

## 2023-07-10 PROCEDURE — 83735 ASSAY OF MAGNESIUM: CPT | Performed by: INTERNAL MEDICINE

## 2023-07-10 PROCEDURE — 83690 ASSAY OF LIPASE: CPT | Performed by: INTERNAL MEDICINE

## 2023-07-10 PROCEDURE — 82150 ASSAY OF AMYLASE: CPT | Performed by: INTERNAL MEDICINE

## 2023-07-10 PROCEDURE — 80069 RENAL FUNCTION PANEL: CPT | Performed by: INTERNAL MEDICINE

## 2023-07-10 PROCEDURE — 85025 COMPLETE CBC W/AUTO DIFF WBC: CPT | Performed by: INTERNAL MEDICINE

## 2023-07-10 PROCEDURE — 36415 COLL VENOUS BLD VENIPUNCTURE: CPT | Performed by: INTERNAL MEDICINE

## 2023-07-10 PROCEDURE — 80197 ASSAY OF TACROLIMUS: CPT | Performed by: INTERNAL MEDICINE

## 2023-07-10 RX ORDER — TACROLIMUS 1 MG/1
5 CAPSULE ORAL EVERY 12 HOURS
Qty: 300 CAPSULE | Refills: 11 | Status: SHIPPED | OUTPATIENT
Start: 2023-07-10 | End: 2023-07-17 | Stop reason: DRUGHIGH

## 2023-07-10 NOTE — TELEPHONE ENCOUNTER
Patient states she has been experiencing a heavy menstrual cycle since around June 19th but that it has now slowed. No other signs of bleeding or abdominal pain present. Verbalizes understanding of dosage change. Will repeat level next week.     ----- Message from Tammie Broussard DO sent at 7/10/2023  3:33 PM CDT -----  Near baseline kidney and pancreas labs  Unclear cause of low Hgb acutely. Is she having any pain over the organs or any other signs of bleeding.  Check vitamin B12, folic acid levels.  Low FK. Increase FK to 5 BID from 4 BID   CMV pending

## 2023-07-17 ENCOUNTER — LAB VISIT (OUTPATIENT)
Dept: LAB | Facility: HOSPITAL | Age: 28
End: 2023-07-17
Attending: INTERNAL MEDICINE
Payer: MEDICARE

## 2023-07-17 ENCOUNTER — OFFICE VISIT (OUTPATIENT)
Dept: OPHTHALMOLOGY | Facility: CLINIC | Age: 28
End: 2023-07-17
Payer: MEDICARE

## 2023-07-17 DIAGNOSIS — Z94.83 STATUS POST SIMULTANEOUS KIDNEY AND PANCREAS TRANSPLANT: ICD-10-CM

## 2023-07-17 DIAGNOSIS — E10.3592 TYPE 1 DIABETES MELLITUS WITH PROLIFERATIVE RETINOPATHY OF LEFT EYE WITHOUT MACULAR EDEMA: Primary | ICD-10-CM

## 2023-07-17 DIAGNOSIS — Z94.83 STATUS POST PANCREAS TRANSPLANTATION: ICD-10-CM

## 2023-07-17 DIAGNOSIS — E10.3541: ICD-10-CM

## 2023-07-17 DIAGNOSIS — Z94.0 STATUS POST SIMULTANEOUS KIDNEY AND PANCREAS TRANSPLANT: ICD-10-CM

## 2023-07-17 DIAGNOSIS — Z94.0 KIDNEY REPLACED BY TRANSPLANT: ICD-10-CM

## 2023-07-17 LAB
BASOPHILS # BLD AUTO: 0.03 K/UL (ref 0–0.2)
BASOPHILS NFR BLD: 0.6 % (ref 0–1.9)
DIFFERENTIAL METHOD: ABNORMAL
EOSINOPHIL # BLD AUTO: 0.2 K/UL (ref 0–0.5)
EOSINOPHIL NFR BLD: 4.1 % (ref 0–8)
ERYTHROCYTE [DISTWIDTH] IN BLOOD BY AUTOMATED COUNT: 16.4 % (ref 11.5–14.5)
HCT VFR BLD AUTO: 30 % (ref 37–48.5)
HGB BLD-MCNC: 8.8 G/DL (ref 12–16)
IMM GRANULOCYTES # BLD AUTO: 0.01 K/UL (ref 0–0.04)
IMM GRANULOCYTES NFR BLD AUTO: 0.2 % (ref 0–0.5)
LYMPHOCYTES # BLD AUTO: 1.3 K/UL (ref 1–4.8)
LYMPHOCYTES NFR BLD: 28.2 % (ref 18–48)
MCH RBC QN AUTO: 30.2 PG (ref 27–31)
MCHC RBC AUTO-ENTMCNC: 29.3 G/DL (ref 32–36)
MCV RBC AUTO: 103 FL (ref 82–98)
MONOCYTES # BLD AUTO: 0.5 K/UL (ref 0.3–1)
MONOCYTES NFR BLD: 10.8 % (ref 4–15)
NEUTROPHILS # BLD AUTO: 2.6 K/UL (ref 1.8–7.7)
NEUTROPHILS NFR BLD: 56.1 % (ref 38–73)
NRBC BLD-RTO: 0 /100 WBC
PLATELET # BLD AUTO: 404 K/UL (ref 150–450)
PMV BLD AUTO: 9.8 FL (ref 9.2–12.9)
RBC # BLD AUTO: 2.91 M/UL (ref 4–5.4)
TACROLIMUS BLD-MCNC: 6.1 NG/ML (ref 5–15)
WBC # BLD AUTO: 4.65 K/UL (ref 3.9–12.7)

## 2023-07-17 PROCEDURE — 3044F PR MOST RECENT HEMOGLOBIN A1C LEVEL <7.0%: ICD-10-PCS | Mod: CPTII,S$GLB,, | Performed by: OPHTHALMOLOGY

## 2023-07-17 PROCEDURE — 1160F RVW MEDS BY RX/DR IN RCRD: CPT | Mod: CPTII,S$GLB,, | Performed by: OPHTHALMOLOGY

## 2023-07-17 PROCEDURE — 99999 PR PBB SHADOW E&M-EST. PATIENT-LVL III: ICD-10-PCS | Mod: PBBFAC,,, | Performed by: OPHTHALMOLOGY

## 2023-07-17 PROCEDURE — 92250 FUNDUS PHOTOGRAPHY W/I&R: CPT | Mod: S$GLB,,, | Performed by: OPHTHALMOLOGY

## 2023-07-17 PROCEDURE — 3066F PR DOCUMENTATION OF TREATMENT FOR NEPHROPATHY: ICD-10-PCS | Mod: CPTII,S$GLB,, | Performed by: OPHTHALMOLOGY

## 2023-07-17 PROCEDURE — 92250 COLOR FUNDUS PHOTOGRAPHY - OU - BOTH EYES: ICD-10-PCS | Mod: S$GLB,,, | Performed by: OPHTHALMOLOGY

## 2023-07-17 PROCEDURE — 36415 COLL VENOUS BLD VENIPUNCTURE: CPT | Performed by: INTERNAL MEDICINE

## 2023-07-17 PROCEDURE — 3044F HG A1C LEVEL LT 7.0%: CPT | Mod: CPTII,S$GLB,, | Performed by: OPHTHALMOLOGY

## 2023-07-17 PROCEDURE — 99214 OFFICE O/P EST MOD 30 MIN: CPT | Mod: S$GLB,,, | Performed by: OPHTHALMOLOGY

## 2023-07-17 PROCEDURE — 99999 PR PBB SHADOW E&M-EST. PATIENT-LVL III: CPT | Mod: PBBFAC,,, | Performed by: OPHTHALMOLOGY

## 2023-07-17 PROCEDURE — 80197 ASSAY OF TACROLIMUS: CPT | Performed by: INTERNAL MEDICINE

## 2023-07-17 PROCEDURE — 1159F PR MEDICATION LIST DOCUMENTED IN MEDICAL RECORD: ICD-10-PCS | Mod: CPTII,S$GLB,, | Performed by: OPHTHALMOLOGY

## 2023-07-17 PROCEDURE — 85025 COMPLETE CBC W/AUTO DIFF WBC: CPT | Performed by: INTERNAL MEDICINE

## 2023-07-17 PROCEDURE — 92235 FLUORESCEIN ANGRPH MLTIFRAME: CPT | Mod: S$GLB,,, | Performed by: OPHTHALMOLOGY

## 2023-07-17 PROCEDURE — 3066F NEPHROPATHY DOC TX: CPT | Mod: CPTII,S$GLB,, | Performed by: OPHTHALMOLOGY

## 2023-07-17 PROCEDURE — 99214 PR OFFICE/OUTPT VISIT, EST, LEVL IV, 30-39 MIN: ICD-10-PCS | Mod: S$GLB,,, | Performed by: OPHTHALMOLOGY

## 2023-07-17 PROCEDURE — 1160F PR REVIEW ALL MEDS BY PRESCRIBER/CLIN PHARMACIST DOCUMENTED: ICD-10-PCS | Mod: CPTII,S$GLB,, | Performed by: OPHTHALMOLOGY

## 2023-07-17 PROCEDURE — 92235 FLUORESCEIN ANGIOGRAPHY - OU - BOTH EYES: ICD-10-PCS | Mod: S$GLB,,, | Performed by: OPHTHALMOLOGY

## 2023-07-17 PROCEDURE — 1159F MED LIST DOCD IN RCRD: CPT | Mod: CPTII,S$GLB,, | Performed by: OPHTHALMOLOGY

## 2023-07-17 RX ORDER — TACROLIMUS 1 MG/1
CAPSULE ORAL
Qty: 300 CAPSULE | Refills: 11 | Status: SHIPPED | OUTPATIENT
Start: 2023-07-17 | End: 2023-08-03 | Stop reason: DRUGHIGH

## 2023-07-17 RX ORDER — FLUORESCEIN 500 MG/ML
5 INJECTION INTRAVENOUS ONCE
Status: COMPLETED | OUTPATIENT
Start: 2023-07-17 | End: 2023-07-17

## 2023-07-17 RX ADMIN — FLUORESCEIN 500 MG: 500 INJECTION INTRAVENOUS at 11:07

## 2023-07-17 NOTE — TELEPHONE ENCOUNTER
Message sent to patient via [facility] detailing dosage change. Will plan to repeat level in 2 weeks.       ----- Message from Tammie Broussard DO sent at 7/17/2023  1:36 PM CDT -----  Still with low FK. Increase FK to 6/5 from 5 BID  Recheck in 2 weeks

## 2023-07-20 NOTE — PROGRESS NOTES
Subjective:       Patient ID: Isabela Read is a 28 y.o. female      Chief Complaint   Patient presents with    Concerns About Ocular Health    Follow-up      and CHRISTOPH as instructed     History of Present Illness  HPI     Follow-up     Additional comments:  and CHRISTOPH as instructed           Comments    F/u With [facility] OS     Pt states VA Is stable no changes.   Denies Flashes, floaters or pain     Eye meds:   Ran out of predforte             Last edited by Maximilian Montaño MD on 7/19/2023 10:04 PM.        Imaging:    See report    Assessment/Plan:     1. Type 1 diabetes mellitus with right eye affected by proliferative retinopathy and combined traction and rhegmatogenous retinal detachment  Currently attached 4 months after RD repair, SO in place  Observe  Refer for CL refraction OD to assess if any vision could be gained by SOR and IOL placement    Continue Prednisolone 1/0    RD precautions    - Posterior Segment OCT Retina-Both eyes    2. 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  Has room for more PRP  IVFA today confirms cap dropout and leakage  Recommend PRP OS    Diabetic Retinopathy discussed in detail, all questions answered  Stressed importance of good BS/BP/Chol Control  RTC immediately PRN any vision changes, laure blurry vision, missing vision, floaters, distortions, etc    - Posterior Segment OCT Retina-Both eyes    Follow up in about 2 weeks (around 7/31/2023), or if symptoms worsen or fail to improve, for PRP OS.

## 2023-07-27 NOTE — PROGRESS NOTES
Isabela Read received IM Depo Provera to the right upper outer side of the deltoid on 7/27/2023.    The patient was instructed to get the next injection on 10/12-10/26/23.    Lot Number: [id number]  Expiration Date: 04/30/2027   [facility]

## 2023-07-31 ENCOUNTER — OFFICE VISIT (OUTPATIENT)
Dept: ORTHOPEDICS | Facility: CLINIC | Age: 28
End: 2023-07-31
Payer: MEDICARE

## 2023-07-31 ENCOUNTER — OFFICE VISIT (OUTPATIENT)
Dept: UROLOGY | Facility: CLINIC | Age: 28
End: 2023-07-31
Payer: MEDICARE

## 2023-07-31 VITALS
DIASTOLIC BLOOD PRESSURE: 62 MMHG | HEART RATE: 101 BPM | BODY MASS INDEX: 26.46 KG/M2 | SYSTOLIC BLOOD PRESSURE: 93 MMHG | WEIGHT: 155 LBS | HEIGHT: 64 IN

## 2023-07-31 DIAGNOSIS — G56.23 CUBITAL TUNNEL SYNDROME, BILATERAL: ICD-10-CM

## 2023-07-31 DIAGNOSIS — N39.0 RECURRENT UTI: ICD-10-CM

## 2023-07-31 DIAGNOSIS — G56.03 CARPAL TUNNEL SYNDROME, BILATERAL: Primary | ICD-10-CM

## 2023-07-31 DIAGNOSIS — Z94.0 KIDNEY REPLACED BY TRANSPLANT: Primary | ICD-10-CM

## 2023-07-31 PROCEDURE — 99214 PR OFFICE/OUTPT VISIT, EST, LEVL IV, 30-39 MIN: ICD-10-PCS | Mod: S$GLB,,, | Performed by: NURSE PRACTITIONER

## 2023-07-31 PROCEDURE — 99214 OFFICE O/P EST MOD 30 MIN: CPT | Mod: S$GLB,,, | Performed by: ORTHOPAEDIC SURGERY

## 2023-07-31 PROCEDURE — 3044F PR MOST RECENT HEMOGLOBIN A1C LEVEL <7.0%: ICD-10-PCS | Mod: CPTII,S$GLB,, | Performed by: NURSE PRACTITIONER

## 2023-07-31 PROCEDURE — 3078F DIAST BP <80 MM HG: CPT | Mod: CPTII,S$GLB,, | Performed by: NURSE PRACTITIONER

## 2023-07-31 PROCEDURE — 1160F RVW MEDS BY RX/DR IN RCRD: CPT | Mod: CPTII,S$GLB,, | Performed by: NURSE PRACTITIONER

## 2023-07-31 PROCEDURE — 99999 PR PBB SHADOW E&M-EST. PATIENT-LVL IV: CPT | Mod: PBBFAC,,, | Performed by: NURSE PRACTITIONER

## 2023-07-31 PROCEDURE — 3066F NEPHROPATHY DOC TX: CPT | Mod: CPTII,S$GLB,, | Performed by: ORTHOPAEDIC SURGERY

## 2023-07-31 PROCEDURE — 3066F PR DOCUMENTATION OF TREATMENT FOR NEPHROPATHY: ICD-10-PCS | Mod: CPTII,S$GLB,, | Performed by: NURSE PRACTITIONER

## 2023-07-31 PROCEDURE — 99214 PR OFFICE/OUTPT VISIT, EST, LEVL IV, 30-39 MIN: ICD-10-PCS | Mod: S$GLB,,, | Performed by: ORTHOPAEDIC SURGERY

## 2023-07-31 PROCEDURE — 99214 OFFICE O/P EST MOD 30 MIN: CPT | Mod: S$GLB,,, | Performed by: NURSE PRACTITIONER

## 2023-07-31 PROCEDURE — 3008F BODY MASS INDEX DOCD: CPT | Mod: CPTII,S$GLB,, | Performed by: NURSE PRACTITIONER

## 2023-07-31 PROCEDURE — 1160F PR REVIEW ALL MEDS BY PRESCRIBER/CLIN PHARMACIST DOCUMENTED: ICD-10-PCS | Mod: CPTII,S$GLB,, | Performed by: NURSE PRACTITIONER

## 2023-07-31 PROCEDURE — 3044F PR MOST RECENT HEMOGLOBIN A1C LEVEL <7.0%: ICD-10-PCS | Mod: CPTII,S$GLB,, | Performed by: ORTHOPAEDIC SURGERY

## 2023-07-31 PROCEDURE — 99999 PR PBB SHADOW E&M-EST. PATIENT-LVL III: CPT | Mod: PBBFAC,,, | Performed by: ORTHOPAEDIC SURGERY

## 2023-07-31 PROCEDURE — 99999 PR PBB SHADOW E&M-EST. PATIENT-LVL IV: ICD-10-PCS | Mod: PBBFAC,,, | Performed by: NURSE PRACTITIONER

## 2023-07-31 PROCEDURE — 3044F HG A1C LEVEL LT 7.0%: CPT | Mod: CPTII,S$GLB,, | Performed by: ORTHOPAEDIC SURGERY

## 2023-07-31 PROCEDURE — 1159F PR MEDICATION LIST DOCUMENTED IN MEDICAL RECORD: ICD-10-PCS | Mod: CPTII,S$GLB,, | Performed by: NURSE PRACTITIONER

## 2023-07-31 PROCEDURE — 3078F PR MOST RECENT DIASTOLIC BLOOD PRESSURE < 80 MM HG: ICD-10-PCS | Mod: CPTII,S$GLB,, | Performed by: NURSE PRACTITIONER

## 2023-07-31 PROCEDURE — 3008F PR BODY MASS INDEX (BMI) DOCUMENTED: ICD-10-PCS | Mod: CPTII,S$GLB,, | Performed by: NURSE PRACTITIONER

## 2023-07-31 PROCEDURE — 3074F PR MOST RECENT SYSTOLIC BLOOD PRESSURE < 130 MM HG: ICD-10-PCS | Mod: CPTII,S$GLB,, | Performed by: NURSE PRACTITIONER

## 2023-07-31 PROCEDURE — 3044F HG A1C LEVEL LT 7.0%: CPT | Mod: CPTII,S$GLB,, | Performed by: NURSE PRACTITIONER

## 2023-07-31 PROCEDURE — 3074F SYST BP LT 130 MM HG: CPT | Mod: CPTII,S$GLB,, | Performed by: NURSE PRACTITIONER

## 2023-07-31 PROCEDURE — 3066F NEPHROPATHY DOC TX: CPT | Mod: CPTII,S$GLB,, | Performed by: NURSE PRACTITIONER

## 2023-07-31 PROCEDURE — 99999 PR PBB SHADOW E&M-EST. PATIENT-LVL III: ICD-10-PCS | Mod: PBBFAC,,, | Performed by: ORTHOPAEDIC SURGERY

## 2023-07-31 PROCEDURE — 3066F PR DOCUMENTATION OF TREATMENT FOR NEPHROPATHY: ICD-10-PCS | Mod: CPTII,S$GLB,, | Performed by: ORTHOPAEDIC SURGERY

## 2023-07-31 PROCEDURE — 1159F MED LIST DOCD IN RCRD: CPT | Mod: CPTII,S$GLB,, | Performed by: NURSE PRACTITIONER

## 2023-07-31 NOTE — PROGRESS NOTES
CHIEF COMPLAINT:    Mrs. Read is a 28 y.o. female presenting for   Chief Complaint   Patient presents with    Follow-up     Recurrent uti     .  PRESENTING ILLNESS:    Isabela Read is a 28 y.o. female with a PMH of ESRD secondary to diabetic nephropathy and HTN, now s/p SPK 11/3/22 (Thymo induction, CMV D-/R+). who presents for recurring uti.     Established patient of urology department. Presents today due to recurring uti.  Initially evaluated in urology department due to urinary retention post-op.  Last seen in urology 1/5/23 for voiding trial.  Since last appointment has had 2 culture proven UTIs.  We discussed behavioral modifications and supplements to prevent UTIs in the future.  Had cystoscopy 11/2022.  CT abd/pelvis 1/2023 reviewed.    Urine culture: >100K E coli 1/21/23    Enterococcus, e coli, and staph epi <50K each       REVIEW OF SYSTEMS:    Review of Systems   Constitutional:  Negative for chills and fever.   Respiratory:  Negative for shortness of breath.    Cardiovascular:  Negative for chest pain.   Gastrointestinal:  Negative for constipation and diarrhea.   Genitourinary:  Negative for dysuria, flank pain, frequency, hematuria and urgency.   Neurological:  Negative for dizziness and weakness.        PATIENT HISTORY:    Past Medical History:   Diagnosis Date    Acute cystitis without hematuria 11/25/2022    Acute kidney injury superimposed on chronic kidney disease 4/7/2021    Anemia     Anemia in ESRD (end-stage renal disease) 7/29/2020    Lab Results  Component Value Date   WBC 7.23 07/29/2020   HGB 7.1 (L) 07/29/2020   HCT 22.2 (L) 07/29/2020   MCV 91 07/29/2020    (H) 07/29/2020   Following with hematology and scheduled to undergo iron infusions    Bacteremia due to Escherichia coli 1/23/2023    Bilious vomiting with nausea 12/12/2022    Chronic hypertension with exacerbation during pregnancy in second trimester 11/6/2020    Current regimen (11/6/20):  - Carvedilol 12.5 mg  BID - Nifedipine 30 mg daily Baseline CKD + proteinuria    Chronic kidney disease     Depression     Diabetes mellitus     Diabetic retinopathy     Diarrhea     Encounter for blood transfusion     End stage renal failure on dialysis     Fever 12/1/2022    Fever of undetermined origin 12/12/2022    Gastroparesis     Glaucoma     Hepatomegaly 4/29/2021    History of diabetes mellitus, type I 12/20/2022    Hx of psychiatric care     Hyperlipidemia     Hypertension     Nausea and vomiting 12/12/2022    Nephrotic syndrome     Nephrotic syndrome 3/4/2021    Nonspecific reaction to tuberculin skin test without active tuberculosis 10/1/2021    Orthostatic hypotension 1/25/2023    Palpitations     Poor fetal growth affecting management of mother in second trimester 10/15/2020    Pyelonephritis, acute 11/26/2022    Restrictive lung disease     Retinal detachment     Sepsis 12/1/2022    Sepsis 11/25/2022    Sepsis due to Escherichia coli 1/23/2023    Severe pre-eclampsia in second trimester 11/6/2020    Severe sepsis 11/25/2022    SIRS (systemic inflammatory response syndrome) 12/6/2022    Status post pancreas transplantation 11/26/2022 11/2/2022    Type 1 diabetes mellitus with end-stage renal disease (ESRD) 2/24/2015    Followed by Dr. Mayo.  Last A1C was 13  Currently on lantus 20 units qAM and humolog 10 units with meals  - a fetal echocardiogram around 22-24 weeks - needs eye exam, podiatry exam  - needs EKG, maternal echo and fu with cardiology  - ASA 81mg, folic acid 4mg PO daily - hemoglobin A1c every 4-6 weeks -24 hour urine for protein and creatinine clearance should be performed. Patient will also need a       Family History   Problem Relation Age of Onset    Hypertension Mother     Heart disease Father     Diabetes Brother     Celiac disease Neg Hx     Cirrhosis Neg Hx     Colon cancer Neg Hx     Colon polyps Neg Hx     Crohn's disease Neg Hx     Inflammatory bowel disease Neg Hx     Liver cancer Neg Hx      Liver disease Neg Hx     Rectal cancer Neg Hx     Stomach cancer Neg Hx     Ulcerative colitis Neg Hx     Esophageal cancer Neg Hx     Hemochromatosis Neg Hx     Pancreatic cancer Neg Hx     Kidney cancer Neg Hx     Bladder Cancer Neg Hx     Uterine cancer Neg Hx     Ovarian cancer Neg Hx        Allergies:  Patient has no known allergies.    Medications:    Current Outpatient Medications:     aspirin (ECOTRIN) 81 MG EC tablet, Take 81 mg by mouth once daily., Disp: , Rfl:     ergocalciferol (ERGOCALCIFEROL) 50,000 unit Cap, Take 1 capsule (50,000 Units total) by mouth every 7 days., Disp: 4 capsule, Rfl: 5    ketoconazole (NIZORAL) 2 % cream, Apply topically 2 (two) times daily as needed for dry areas of face, Disp: 60 g, Rfl: 1    medroxyPROGESTERone (DEPO-PROVERA) 150 mg/mL Syrg, Inject 1 mL (150 mg total) into the muscle once. for 1 dose, Disp: 1 mL, Rfl: 3    mycophenolate (CELLCEPT) 250 mg Cap, Take 2 capsules (500 mg total) by mouth 2 (two) times daily., Disp: 120 capsule, Rfl: 11    predniSONE (DELTASONE) 5 MG tablet, Take 1 tablet (5 mg total) by mouth once daily., Disp: 30 tablet, Rfl: 11    promethazine (PHENERGAN) 12.5 MG Tab, Take 1 tablet (12.5 mg total) by mouth every 6 (six) hours as needed (nausea)., Disp: 30 tablet, Rfl: 1    rosuvastatin (CRESTOR) 10 MG tablet, Take 1 tablet (10 mg total) by mouth every evening., Disp: 90 tablet, Rfl: 0    sodium bicarbonate 650 MG tablet, Take 2 tablets (1,300 mg total) by mouth 3 (three) times daily., Disp: 180 tablet, Rfl: 11    tacrolimus (PROGRAF) 1 MG Cap, Take 6 capsules (6 mg total) by mouth every morning AND 5 capsules (5 mg total) every evening., Disp: 300 capsule, Rfl: 11    tretinoin (RETIN-A) 0.05 % cream, Apply topically every evening. Start with every other night and move up to nightly after 2 weeks if not too dry., Disp: 20 g, Rfl: 1    PHYSICAL EXAMINATION:    Physical Exam  Vitals and nursing note reviewed.   Constitutional:       Appearance:  Normal appearance. She is well-developed.   HENT:      Head: Normocephalic and atraumatic.   Eyes:      Pupils: Pupils are equal, round, and reactive to light.   Pulmonary:      Effort: Pulmonary effort is normal.   Musculoskeletal:         General: Normal range of motion.      Cervical back: Normal range of motion.   Skin:     General: Skin is warm and dry.   Neurological:      Mental Status: She is alert and oriented to person, place, and time.   Psychiatric:         Behavior: Behavior normal.           LABS:      IMPRESSION:    Encounter Diagnoses   Name Primary?    Kidney replaced by transplant Yes    Recurrent UTI        Kidney transplant u/s 6/17/23:  Impression:     Satisfactory gray scale and doppler evaluation of renal allograft.     Mild hydronephrosis similar to prior.    CT abd/pelvis with contrast 1/21/23:  Impression:     This report was flagged in Epic as abnormal.     1. Edematous appearance of the renal transplant noting transplant hydronephrosis.  Further evaluation with ultrasound is recommended.  No discrete peritransplant fluid collections.  2. Liquid stool throughout the large bowel, correlation with any history of diarrheal illness.  Wall thickening involving the descending colon/cecum has resolved.  3. Findings may reflect hepatic steatosis noting hepatomegaly, correlation with LFTs recommended.  4. Please see above for several additional findings.       PLAN:    Problem List Items Addressed This Visit    None  Visit Diagnoses       Kidney replaced by transplant    -  Primary    Recurrent UTI                  1. Recurrent uti   UTI precautions:  Wipe front to back and avoid constipation.  Avoid caffeine.  Drink 1 liter of water daily  Void every 3-4 hrs  Expel urine from vagina post void  Void soon after urge arises  No dryer sheets or harsh detergents with the undergarments  No bubble baths  Void before and after intercourse  Avoid hot tub use  Avoid tight fitting clothes and panty  hose  D-Mannose 2 grams- helps to block bacteria from attaching to the bacteria wall  Cranberry supplement w/ 36 mg of PAC-helps to block bacteria from attaching to the bacteria wall  Probiotic- [employer] Ultra 25 Billion CFU Probiotic Complex, Multi Strain.  The Multi Strain is specifically the one that is important as the greater variety of strains is better.    Discussed recurrent UTI work up (cysto and renal ultrasound) in detail. Will proceed with work up if patient develops another culture proven UTI.  Reviewed previous cystoscopy and ct results      2. Kptx 11/3/2022 (Kidney / Pancreas)   F/u per transplant    3. Rtc in 3 mths      Cheyenne Romero NP        I spent over 30 minutes with the patient. Over 50% of the visit was spent in counseling.

## 2023-07-31 NOTE — LETTER
[facility]  [address] [facility].  [place] [address]  Phone: [phone]

## 2023-07-31 NOTE — PATIENT INSTRUCTIONS
UTI precautions:  Wipe front to back and avoid constipation.  Avoid caffeine.  Drink 1 liter of water daily  Void every 3-4 hrs  Expel urine from vagina post void  Void soon after urge arises  No dryer sheets or harsh detergents with the undergarments  No bubble baths  Void before and after intercourse  Avoid hot tub use  Avoid tight fitting clothes and panty hose  D-Mannose 2 grams- helps to block bacteria from attaching to the bacteria wall  Cranberry supplement w/ 36 mg of PAC-helps to block bacteria from attaching to the bacteria wall  Probiotic- [employer] Ultra 25 Billion CFU Probiotic Complex, Multi Strain.  The Multi Strain is specifically the one that is important as the greater variety of strains is better.

## 2023-07-31 NOTE — PROGRESS NOTES
Isabela Read presents for follow up evaluation of No diagnosis found.The patient has had an EMG/NCS which we reviewed together today and it showed:    EMG 7.5.23 Impression:  There is electrophysiologic evidence of a bilateral sensory median mononeuropathy across the wrist (I.e. Carpal tunnel syndrome).  There is no motor axonal loss.  There is no active denervation.  This is graded as Mild in severity bilaterally.    There is evidence of a left ulnar neuropathy, likely at/near the elbow (such as would be seen in cubital tunnel syndrome).  There is focal demyelination around the elbow and absent ulnar sensory and KRISTOPHER sensory responses.  Unable to needle the left arm due to AV fistula.  This is graded as at least moderate in severity.    Lastly, the sensory>motor responses are all borderline in amplitude.  In her age, we would expect more robust responses.  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  She does have n/t of the feet, and so an order for NCS/EMG of the lower extremities may help better elucidate this.     PE:    AA&O x 4.  NAD  HEENT:  NCAT, sclera nonicteric  Lungs:  Respirations are equal and unlabored.  CV:  2+ bilateral upper and lower extremity pulses.  MSK:  Neurovascularly intact bilaterally.  5/5 thenar and intrinsic musculature strength.  Full range of motion hands, wrists and elbows.  Positive compression, Tinel's and Phalen's bilateral wrists and elbows.    A/P: Carpal tunnel syndrome and cubital tunnel syndrome  We have discussed conservative vs. surgical treatment as well as risks, benefits and alternatives for left carpal tunnel syndrome and cubital tunnel syndrome.  She has exhausted conservative measures and would like to proceed with surgery. Surgery would include left carpal tunnel release and left ulnar nerve decompression at elbow.     We have discussed risks of hand surgery which include but are not limited to  blood clots in the legs that can travel to the lungs (pulmonary embolism). Pulmonary embolism can cause shortness of breath, chest pain, and even shock. Other risks include urinary tract infection, nausea and vomiting (usually related to pain medication), chronic pain, bleeding, nerve damage, blood vessel injury, scarring and infection of the hand which can require re-operation. Furthermore, the risks of anesthesia include potential heart, lung, kidney, and liver damage.  Informed consent was obtained.  she understands and would like to proceed with surgery in the near future.    Call with any questions/concerns in the interim        Lola Guerra MD    Please be aware that this note has been generated with the assistance of ladonna voice-to-text.  Please excuse any spelling or grammatical errors.

## 2023-07-31 NOTE — LETTER
[facility]  [address] [facility].  [place] [address]  Phone: [phone]     I am involved the care of out mutual patient Isabela Read.  They have elected to go forward with surgery to address Left carpal tunnel syndrome and left ulnar nerve decompression; date pending clearance and recommendations. I request your assistance with pre-operative evaluation and determination specifically regarding: Recommendations on cellcept, prednisone, Prograf prior to surgery.    I appreciate you assistance and please don't hesitate to call or reach out with questions/concerns.    Sincerely,        Lola Guerra MD  Hand & Wrist Orthopaedic Surgery  05/02/2023

## 2023-08-01 ENCOUNTER — LAB VISIT (OUTPATIENT)
Dept: LAB | Facility: HOSPITAL | Age: 28
End: 2023-08-01
Attending: INTERNAL MEDICINE
Payer: MEDICARE

## 2023-08-01 DIAGNOSIS — D52.0 DIETARY FOLATE DEFICIENCY ANEMIA: ICD-10-CM

## 2023-08-01 DIAGNOSIS — E10.29 TYPE 1 DIABETES MELLITUS WITH OTHER DIABETIC KIDNEY COMPLICATION: ICD-10-CM

## 2023-08-01 DIAGNOSIS — N18.9 ANEMIA OF RENAL DISEASE: ICD-10-CM

## 2023-08-01 DIAGNOSIS — Z94.0 KIDNEY REPLACED BY TRANSPLANT: ICD-10-CM

## 2023-08-01 DIAGNOSIS — E53.8 DEFICIENCY OF OTHER SPECIFIED B GROUP VITAMINS: ICD-10-CM

## 2023-08-01 DIAGNOSIS — Z94.83 STATUS POST PANCREAS TRANSPLANTATION: ICD-10-CM

## 2023-08-01 DIAGNOSIS — D63.1 ANEMIA OF RENAL DISEASE: ICD-10-CM

## 2023-08-01 LAB
ALBUMIN SERPL BCP-MCNC: 3.8 G/DL (ref 3.5–5.2)
ALBUMIN SERPL BCP-MCNC: 3.8 G/DL (ref 3.5–5.2)
ALP SERPL-CCNC: 73 U/L (ref 55–135)
ALT SERPL W/O P-5'-P-CCNC: 27 U/L (ref 10–44)
AMYLASE SERPL-CCNC: 86 U/L (ref 20–110)
ANION GAP SERPL CALC-SCNC: 8 MMOL/L (ref 8–16)
AST SERPL-CCNC: 20 U/L (ref 10–40)
BASOPHILS # BLD AUTO: 0.03 K/UL (ref 0–0.2)
BASOPHILS NFR BLD: 0.7 % (ref 0–1.9)
BILIRUB DIRECT SERPL-MCNC: 0.1 MG/DL (ref 0.1–0.3)
BILIRUB SERPL-MCNC: 0.3 MG/DL (ref 0.1–1)
BUN SERPL-MCNC: 23 MG/DL (ref 6–20)
C PEPTIDE SERPL-MCNC: 4.85 NG/ML (ref 0.78–5.19)
CALCIUM SERPL-MCNC: 9.8 MG/DL (ref 8.7–10.5)
CHLORIDE SERPL-SCNC: 110 MMOL/L (ref 95–110)
CO2 SERPL-SCNC: 21 MMOL/L (ref 23–29)
CREAT SERPL-MCNC: 1 MG/DL (ref 0.5–1.4)
DIFFERENTIAL METHOD: ABNORMAL
EOSINOPHIL # BLD AUTO: 0.1 K/UL (ref 0–0.5)
EOSINOPHIL NFR BLD: 2.8 % (ref 0–8)
ERYTHROCYTE [DISTWIDTH] IN BLOOD BY AUTOMATED COUNT: 14.4 % (ref 11.5–14.5)
EST. GFR  (NO RACE VARIABLE): >60 ML/MIN/1.73 M^2
ESTIMATED AVG GLUCOSE: 77 MG/DL (ref 68–131)
FOLATE SERPL-MCNC: 6.3 NG/ML (ref 4–24)
GLUCOSE SERPL-MCNC: 85 MG/DL (ref 70–110)
HBA1C MFR BLD: 4.3 % (ref 4–5.6)
HCT VFR BLD AUTO: 35.9 % (ref 37–48.5)
HGB BLD-MCNC: 11 G/DL (ref 12–16)
IMM GRANULOCYTES # BLD AUTO: 0.01 K/UL (ref 0–0.04)
IMM GRANULOCYTES NFR BLD AUTO: 0.2 % (ref 0–0.5)
LIPASE SERPL-CCNC: 14 U/L (ref 4–60)
LYMPHOCYTES # BLD AUTO: 1.3 K/UL (ref 1–4.8)
LYMPHOCYTES NFR BLD: 31.4 % (ref 18–48)
MAGNESIUM SERPL-MCNC: 1.9 MG/DL (ref 1.6–2.6)
MCH RBC QN AUTO: 31 PG (ref 27–31)
MCHC RBC AUTO-ENTMCNC: 30.6 G/DL (ref 32–36)
MCV RBC AUTO: 101 FL (ref 82–98)
MONOCYTES # BLD AUTO: 0.5 K/UL (ref 0.3–1)
MONOCYTES NFR BLD: 11.2 % (ref 4–15)
NEUTROPHILS # BLD AUTO: 2.3 K/UL (ref 1.8–7.7)
NEUTROPHILS NFR BLD: 53.7 % (ref 38–73)
NRBC BLD-RTO: 0 /100 WBC
PHOSPHATE SERPL-MCNC: 3.3 MG/DL (ref 2.7–4.5)
PLATELET # BLD AUTO: 324 K/UL (ref 150–450)
PMV BLD AUTO: 10.4 FL (ref 9.2–12.9)
POTASSIUM SERPL-SCNC: 4.4 MMOL/L (ref 3.5–5.1)
PROT SERPL-MCNC: 7.3 G/DL (ref 6–8.4)
RBC # BLD AUTO: 3.55 M/UL (ref 4–5.4)
SODIUM SERPL-SCNC: 139 MMOL/L (ref 136–145)
TACROLIMUS BLD-MCNC: 13.6 NG/ML (ref 5–15)
VIT B12 SERPL-MCNC: 555 PG/ML (ref 210–950)
WBC # BLD AUTO: 4.27 K/UL (ref 3.9–12.7)

## 2023-08-01 PROCEDURE — 85025 COMPLETE CBC W/AUTO DIFF WBC: CPT | Performed by: INTERNAL MEDICINE

## 2023-08-01 PROCEDURE — 80069 RENAL FUNCTION PANEL: CPT | Performed by: INTERNAL MEDICINE

## 2023-08-01 PROCEDURE — 82150 ASSAY OF AMYLASE: CPT | Performed by: INTERNAL MEDICINE

## 2023-08-01 PROCEDURE — 83036 HEMOGLOBIN GLYCOSYLATED A1C: CPT | Performed by: INTERNAL MEDICINE

## 2023-08-01 PROCEDURE — 84681 ASSAY OF C-PEPTIDE: CPT | Performed by: INTERNAL MEDICINE

## 2023-08-01 PROCEDURE — 83735 ASSAY OF MAGNESIUM: CPT | Performed by: INTERNAL MEDICINE

## 2023-08-01 PROCEDURE — 36415 COLL VENOUS BLD VENIPUNCTURE: CPT | Performed by: INTERNAL MEDICINE

## 2023-08-01 PROCEDURE — 80197 ASSAY OF TACROLIMUS: CPT | Performed by: INTERNAL MEDICINE

## 2023-08-01 PROCEDURE — 82746 ASSAY OF FOLIC ACID SERUM: CPT | Performed by: INTERNAL MEDICINE

## 2023-08-01 PROCEDURE — 87799 DETECT AGENT NOS DNA QUANT: CPT | Performed by: INTERNAL MEDICINE

## 2023-08-01 PROCEDURE — 83690 ASSAY OF LIPASE: CPT | Performed by: INTERNAL MEDICINE

## 2023-08-01 PROCEDURE — 82607 VITAMIN B-12: CPT | Performed by: INTERNAL MEDICINE

## 2023-08-01 PROCEDURE — 84075 ASSAY ALKALINE PHOSPHATASE: CPT | Performed by: INTERNAL MEDICINE

## 2023-08-03 ENCOUNTER — PATIENT MESSAGE (OUTPATIENT)
Dept: TRANSPLANT | Facility: CLINIC | Age: 28
End: 2023-08-03
Payer: MEDICARE

## 2023-08-03 DIAGNOSIS — Z94.83 STATUS POST SIMULTANEOUS KIDNEY AND PANCREAS TRANSPLANT: ICD-10-CM

## 2023-08-03 DIAGNOSIS — Z94.0 STATUS POST SIMULTANEOUS KIDNEY AND PANCREAS TRANSPLANT: ICD-10-CM

## 2023-08-03 NOTE — TELEPHONE ENCOUNTER
Message sent to patient via [facility] detailing dosage change. Will plan to repeat level on 8/17.    ----- Message from Tammie Broussard DO sent at 8/3/2023  4:22 PM CDT -----  BK negative  FK high. Decrease FK dose to 5 mg BID from FK 6/5  Recheck in 2 weeks

## 2023-08-04 RX ORDER — TACROLIMUS 1 MG/1
CAPSULE ORAL
Qty: 300 CAPSULE | Refills: 11 | Status: SHIPPED | OUTPATIENT
Start: 2023-08-04 | End: 2023-09-05 | Stop reason: DRUGHIGH

## 2023-08-17 ENCOUNTER — LAB VISIT (OUTPATIENT)
Dept: LAB | Facility: HOSPITAL | Age: 28
End: 2023-08-17
Attending: INTERNAL MEDICINE
Payer: MEDICARE

## 2023-08-17 ENCOUNTER — OFFICE VISIT (OUTPATIENT)
Dept: DERMATOLOGY | Facility: CLINIC | Age: 28
End: 2023-08-17
Payer: MEDICARE

## 2023-08-17 DIAGNOSIS — Z94.83 STATUS POST PANCREAS TRANSPLANTATION: ICD-10-CM

## 2023-08-17 DIAGNOSIS — Z94.0 KIDNEY REPLACED BY TRANSPLANT: ICD-10-CM

## 2023-08-17 DIAGNOSIS — L70.9 ACNE, UNSPECIFIED ACNE TYPE: Primary | ICD-10-CM

## 2023-08-17 DIAGNOSIS — L21.9 SEBORRHEIC DERMATITIS: ICD-10-CM

## 2023-08-17 LAB — TACROLIMUS BLD-MCNC: 10.3 NG/ML (ref 5–15)

## 2023-08-17 PROCEDURE — 1160F PR REVIEW ALL MEDS BY PRESCRIBER/CLIN PHARMACIST DOCUMENTED: ICD-10-PCS | Mod: CPTII,S$GLB,, | Performed by: DERMATOLOGY

## 2023-08-17 PROCEDURE — 3066F PR DOCUMENTATION OF TREATMENT FOR NEPHROPATHY: ICD-10-PCS | Mod: CPTII,S$GLB,, | Performed by: DERMATOLOGY

## 2023-08-17 PROCEDURE — 99999 PR PBB SHADOW E&M-EST. PATIENT-LVL I: ICD-10-PCS | Mod: PBBFAC,,, | Performed by: DERMATOLOGY

## 2023-08-17 PROCEDURE — 3044F HG A1C LEVEL LT 7.0%: CPT | Mod: CPTII,S$GLB,, | Performed by: DERMATOLOGY

## 2023-08-17 PROCEDURE — 3044F PR MOST RECENT HEMOGLOBIN A1C LEVEL <7.0%: ICD-10-PCS | Mod: CPTII,S$GLB,, | Performed by: DERMATOLOGY

## 2023-08-17 PROCEDURE — 3066F NEPHROPATHY DOC TX: CPT | Mod: CPTII,S$GLB,, | Performed by: DERMATOLOGY

## 2023-08-17 PROCEDURE — 99213 OFFICE O/P EST LOW 20 MIN: CPT | Mod: S$GLB,,, | Performed by: DERMATOLOGY

## 2023-08-17 PROCEDURE — 1160F RVW MEDS BY RX/DR IN RCRD: CPT | Mod: CPTII,S$GLB,, | Performed by: DERMATOLOGY

## 2023-08-17 PROCEDURE — 99999 PR PBB SHADOW E&M-EST. PATIENT-LVL I: CPT | Mod: PBBFAC,,, | Performed by: DERMATOLOGY

## 2023-08-17 PROCEDURE — 1159F MED LIST DOCD IN RCRD: CPT | Mod: CPTII,S$GLB,, | Performed by: DERMATOLOGY

## 2023-08-17 PROCEDURE — 99213 PR OFFICE/OUTPT VISIT, EST, LEVL III, 20-29 MIN: ICD-10-PCS | Mod: S$GLB,,, | Performed by: DERMATOLOGY

## 2023-08-17 PROCEDURE — 1159F PR MEDICATION LIST DOCUMENTED IN MEDICAL RECORD: ICD-10-PCS | Mod: CPTII,S$GLB,, | Performed by: DERMATOLOGY

## 2023-08-17 PROCEDURE — 80197 ASSAY OF TACROLIMUS: CPT | Performed by: INTERNAL MEDICINE

## 2023-08-17 PROCEDURE — 36415 COLL VENOUS BLD VENIPUNCTURE: CPT | Performed by: INTERNAL MEDICINE

## 2023-08-17 RX ORDER — TRETINOIN 1 MG/G
CREAM TOPICAL NIGHTLY
Qty: 20 G | Refills: 3 | Status: SHIPPED | OUTPATIENT
Start: 2023-08-17

## 2023-08-17 RX ORDER — KETOCONAZOLE 20 MG/G
CREAM TOPICAL 2 TIMES DAILY
Qty: 60 G | Refills: 1 | Status: SHIPPED | OUTPATIENT
Start: 2023-08-17

## 2023-08-17 NOTE — PROGRESS NOTES
Subjective:      Patient ID:  Isabela Read is a 28 y.o. female who presents for   Chief Complaint   Patient presents with    Acne     F/u     Acne - Follow-up  Symptom course: improving  Currently using: OTC salicylic acid wash and ketoconazole (NIZORAL) 2 % cream.  Affected locations: face and chest  Signs / symptoms: dryness and itching        Review of Systems   Constitutional:  Negative for fever.   HENT:  Negative for sore throat.    Respiratory:  Negative for cough.    Skin:  Positive for itching, rash and dry skin.       Objective:   Physical Exam   Constitutional: She appears well-developed and well-nourished. No distress.   Eyes: No conjunctival no injection.   Neurological: She is alert and oriented to person, place, and time. She is not disoriented.   Psychiatric: She has a normal mood and affect.   Skin:   Areas Examined (abnormalities noted in diagram):   Head / Face Inspection Performed            Diagram Legend     Erythematous scaling macule/papule c/w actinic keratosis       Vascular papule c/w angioma      Pigmented verrucoid papule/plaque c/w seborrheic keratosis      Yellow umbilicated papule c/w sebaceous hyperplasia      Irregularly shaped tan macule c/w lentigo     1-2 mm smooth white papules consistent with Milia      Movable subcutaneous cyst with punctum c/w epidermal inclusion cyst      Subcutaneous movable cyst c/w pilar cyst      Firm pink to brown papule c/w dermatofibroma      Pedunculated fleshy papule(s) c/w skin tag(s)      Evenly pigmented macule c/w junctional nevus     Mildly variegated pigmented, slightly irregular-bordered macule c/w mildly atypical nevus      Flesh colored to evenly pigmented papule c/w intradermal nevus       Pink pearly papule/plaque c/w basal cell carcinoma      Erythematous hyperkeratotic cursted plaque c/w SCC      Surgical scar with no sign of skin cancer recurrence      Open and closed comedones      Inflammatory papules and pustules       Verrucoid papule consistent consistent with wart     Erythematous eczematous patches and plaques     Dystrophic onycholytic nail with subungual debris c/w onychomycosis     Umbilicated papule    Erythematous-base heme-crusted tan verrucoid plaque consistent with inflamed seborrheic keratosis     Erythematous Silvery Scaling Plaque c/w Psoriasis     See annotation      Assessment / Plan:        Acne, unspecified acne type  -     tretinoin (RETIN-A) 0.1 % cream; Apply topically every evening. Increase Retin A to 0.1.  Rotate this with old one until weaker strength runs out and then go nightly with 0.1.  Dispense: 20 g; Refill: 3  Better.  Increase RA to 0.1.  Proper application of medications and or care for affected area(s) and condition(s) reviewed.  Patient and or guardian to monitor this area/lesion or these areas/lesions for changes or worsening or darkening (for moles and freckles).  Patient and or guardian to contact us if any changes are noted for such.  No pregnancy with medications reviewed.    Seborrheic dermatitis  -     ketoconazole (NIZORAL) 2 % cream; Apply topically 2 (two) times daily as needed for dry areas of face  Dispense: 60 g; Refill: 1  Condition is stable.  We will continue present management.  Refill keto.  Chronic nature of this condition discussed with patient.  Pt uses qam which is fine.  Proper application of medications and or care for affected area(s) and condition(s) reviewed.             Follow up in about 1 year (around 8/17/2024).

## 2023-08-21 ENCOUNTER — OFFICE VISIT (OUTPATIENT)
Dept: OPHTHALMOLOGY | Facility: CLINIC | Age: 28
End: 2023-08-21
Payer: MEDICARE

## 2023-08-21 DIAGNOSIS — E10.3592 TYPE 1 DIABETES MELLITUS WITH PROLIFERATIVE RETINOPATHY OF LEFT EYE WITHOUT MACULAR EDEMA: Primary | ICD-10-CM

## 2023-08-21 PROCEDURE — 99499 UNLISTED E&M SERVICE: CPT | Mod: S$GLB,,, | Performed by: OPHTHALMOLOGY

## 2023-08-21 PROCEDURE — 99999 PR PBB SHADOW E&M-EST. PATIENT-LVL III: CPT | Mod: PBBFAC,,, | Performed by: OPHTHALMOLOGY

## 2023-08-21 PROCEDURE — 67228 PAN RETINAL PHOTOCOAGULATION - OS - LEFT EYE: ICD-10-PCS | Mod: LT,S$GLB,, | Performed by: OPHTHALMOLOGY

## 2023-08-21 PROCEDURE — 67228 TREATMENT X10SV RETINOPATHY: CPT | Mod: LT,S$GLB,, | Performed by: OPHTHALMOLOGY

## 2023-08-21 PROCEDURE — 99499 NO LOS: ICD-10-PCS | Mod: S$GLB,,, | Performed by: OPHTHALMOLOGY

## 2023-08-21 PROCEDURE — 99999 PR PBB SHADOW E&M-EST. PATIENT-LVL III: ICD-10-PCS | Mod: PBBFAC,,, | Performed by: OPHTHALMOLOGY

## 2023-08-21 RX ORDER — AMOXICILLIN 500 MG/1
500 CAPSULE ORAL EVERY 8 HOURS
COMMUNITY
Start: 2023-08-17 | End: 2023-11-13

## 2023-08-25 NOTE — PROGRESS NOTES
Subjective:       Patient ID: Isabela Read is a 28 y.o. female      Chief Complaint   Patient presents with    Laser Treatment     History of Present Illness  HPI    DLS 07/17/2023  by Dr. AUTUMN Montaño MD    CC: pt state vision is still blurry w/o any new changes.    -eye pain  -headaches  ++diplopia  (overlapping with clear vision)  -flashes/floaters  -shadows/veils/curtain    Eye Meds:  NONE    POHx:   1. Type 1 DM OD w/ PDR and Combined TRD w/ RRD    2. Proliferative Vitreoretinopathy OD   S/P Retinal  Detachment w/ SO       Last edited by Mallory Jacques MA on 8/21/2023  2:53 PM.          Assessment/Plan:     1. Type 1 diabetes mellitus with proliferative retinopathy of left eye without macular edema  PRP OS today as planned    Pt sensitive to laser, may require additional Rx    Risks, benefits, and alternatives to treatment were discussed in detail with the patient.  The patient voiced understanding and wished to proceed with the procedure.  See separate consent form.    - Pan Retinal Photocoagulation - OS - Left Eye    Follow up in about 1 month (around 9/21/2023), or if symptoms worsen or fail to improve, for Comprehensive Examination, OCT Mac.

## 2023-09-04 PROBLEM — R31.9 URINARY TRACT INFECTION WITH HEMATURIA: Status: RESOLVED | Noted: 2023-05-28 | Resolved: 2023-09-04

## 2023-09-04 PROBLEM — N39.0 URINARY TRACT INFECTION WITH HEMATURIA: Status: RESOLVED | Noted: 2023-05-28 | Resolved: 2023-09-04

## 2023-09-05 ENCOUNTER — PATIENT MESSAGE (OUTPATIENT)
Dept: TRANSPLANT | Facility: CLINIC | Age: 28
End: 2023-09-05
Payer: MEDICARE

## 2023-09-05 ENCOUNTER — TELEPHONE (OUTPATIENT)
Dept: TRANSPLANT | Facility: CLINIC | Age: 28
End: 2023-09-05
Payer: MEDICARE

## 2023-09-05 ENCOUNTER — LAB VISIT (OUTPATIENT)
Dept: LAB | Facility: HOSPITAL | Age: 28
End: 2023-09-05
Attending: INTERNAL MEDICINE
Payer: MEDICARE

## 2023-09-05 DIAGNOSIS — Z94.83 STATUS POST PANCREAS TRANSPLANTATION: ICD-10-CM

## 2023-09-05 DIAGNOSIS — Z94.0 STATUS POST SIMULTANEOUS KIDNEY AND PANCREAS TRANSPLANT: ICD-10-CM

## 2023-09-05 DIAGNOSIS — Z94.83 STATUS POST PANCREAS TRANSPLANTATION: Primary | ICD-10-CM

## 2023-09-05 DIAGNOSIS — Z94.0 KIDNEY REPLACED BY TRANSPLANT: ICD-10-CM

## 2023-09-05 DIAGNOSIS — Z94.83 STATUS POST SIMULTANEOUS KIDNEY AND PANCREAS TRANSPLANT: ICD-10-CM

## 2023-09-05 LAB
ALBUMIN SERPL BCP-MCNC: 4 G/DL (ref 3.5–5.2)
AMYLASE SERPL-CCNC: 135 U/L (ref 20–110)
ANION GAP SERPL CALC-SCNC: 8 MMOL/L (ref 8–16)
BASOPHILS # BLD AUTO: 0.03 K/UL (ref 0–0.2)
BASOPHILS NFR BLD: 0.7 % (ref 0–1.9)
BUN SERPL-MCNC: 30 MG/DL (ref 6–20)
CALCIUM SERPL-MCNC: 10.1 MG/DL (ref 8.7–10.5)
CHLORIDE SERPL-SCNC: 111 MMOL/L (ref 95–110)
CO2 SERPL-SCNC: 20 MMOL/L (ref 23–29)
CREAT SERPL-MCNC: 0.9 MG/DL (ref 0.5–1.4)
DIFFERENTIAL METHOD: ABNORMAL
EOSINOPHIL # BLD AUTO: 0.3 K/UL (ref 0–0.5)
EOSINOPHIL NFR BLD: 5.5 % (ref 0–8)
ERYTHROCYTE [DISTWIDTH] IN BLOOD BY AUTOMATED COUNT: 13.1 % (ref 11.5–14.5)
EST. GFR  (NO RACE VARIABLE): >60 ML/MIN/1.73 M^2
GLUCOSE SERPL-MCNC: 81 MG/DL (ref 70–110)
HCT VFR BLD AUTO: 42.8 % (ref 37–48.5)
HGB BLD-MCNC: 13.4 G/DL (ref 12–16)
IMM GRANULOCYTES # BLD AUTO: 0.01 K/UL (ref 0–0.04)
IMM GRANULOCYTES NFR BLD AUTO: 0.2 % (ref 0–0.5)
LIPASE SERPL-CCNC: 20 U/L (ref 4–60)
LYMPHOCYTES # BLD AUTO: 1.1 K/UL (ref 1–4.8)
LYMPHOCYTES NFR BLD: 24.6 % (ref 18–48)
MCH RBC QN AUTO: 30.6 PG (ref 27–31)
MCHC RBC AUTO-ENTMCNC: 31.3 G/DL (ref 32–36)
MCV RBC AUTO: 98 FL (ref 82–98)
MONOCYTES # BLD AUTO: 0.4 K/UL (ref 0.3–1)
MONOCYTES NFR BLD: 9.2 % (ref 4–15)
NEUTROPHILS # BLD AUTO: 2.7 K/UL (ref 1.8–7.7)
NEUTROPHILS NFR BLD: 59.8 % (ref 38–73)
NRBC BLD-RTO: 0 /100 WBC
PHOSPHATE SERPL-MCNC: 3.6 MG/DL (ref 2.7–4.5)
PLATELET # BLD AUTO: 253 K/UL (ref 150–450)
PMV BLD AUTO: 10.7 FL (ref 9.2–12.9)
POTASSIUM SERPL-SCNC: 4.4 MMOL/L (ref 3.5–5.1)
RBC # BLD AUTO: 4.38 M/UL (ref 4–5.4)
SODIUM SERPL-SCNC: 139 MMOL/L (ref 136–145)
TACROLIMUS BLD-MCNC: 11.8 NG/ML (ref 5–15)
WBC # BLD AUTO: 4.55 K/UL (ref 3.9–12.7)

## 2023-09-05 PROCEDURE — 36415 COLL VENOUS BLD VENIPUNCTURE: CPT | Performed by: INTERNAL MEDICINE

## 2023-09-05 PROCEDURE — 82150 ASSAY OF AMYLASE: CPT | Performed by: INTERNAL MEDICINE

## 2023-09-05 PROCEDURE — 80197 ASSAY OF TACROLIMUS: CPT | Performed by: INTERNAL MEDICINE

## 2023-09-05 PROCEDURE — 83690 ASSAY OF LIPASE: CPT | Performed by: INTERNAL MEDICINE

## 2023-09-05 PROCEDURE — 85025 COMPLETE CBC W/AUTO DIFF WBC: CPT | Performed by: INTERNAL MEDICINE

## 2023-09-05 PROCEDURE — 80069 RENAL FUNCTION PANEL: CPT | Performed by: INTERNAL MEDICINE

## 2023-09-05 RX ORDER — TACROLIMUS 1 MG/1
CAPSULE ORAL
Qty: 300 CAPSULE | Refills: 11 | Status: SHIPPED | OUTPATIENT
Start: 2023-09-05 | End: 2023-09-19 | Stop reason: DRUGHIGH

## 2023-09-05 NOTE — TELEPHONE ENCOUNTER
Patient states understanding of plan for pancreas ultrasound. Ultrasound scheduled for later today. Patient taking sodium bicarb as prescribed.      ----- Message from Tammie Broussard DO sent at 9/5/2023 12:31 PM CDT -----  Near baseline kidney graft function  Mildly elevated amylase. Check US pancreas transplant please  FK level pending   Ensure patient is taking sodium bicarb supplements

## 2023-09-05 NOTE — TELEPHONE ENCOUNTER
Unable to reach patient by phone. Detailed message sent to patient via [facility] regarding dosage change and plan to repeat level in 2 weeks.    ----- Message from Tammie Broussard DO sent at 9/5/2023  2:23 PM CDT -----  Mildly elevated FK level. Decrease FK to 5/4 from 5 BID. Recheck in 2 weeks

## 2023-09-06 ENCOUNTER — HOSPITAL ENCOUNTER (OUTPATIENT)
Dept: RADIOLOGY | Facility: HOSPITAL | Age: 28
Discharge: HOME | End: 2023-09-06
Attending: INTERNAL MEDICINE
Payer: MEDICARE

## 2023-09-06 DIAGNOSIS — Z94.83 STATUS POST PANCREAS TRANSPLANTATION: ICD-10-CM

## 2023-09-06 PROCEDURE — 76705 US DOPPLER PANCREAS TRANSPLANT POST (XPD): ICD-10-PCS | Mod: 26,59,, | Performed by: STUDENT IN AN ORGANIZED HEALTH CARE EDUCATION/TRAINING PROGRAM

## 2023-09-06 PROCEDURE — 93976 US DOPPLER PANCREAS TRANSPLANT POST (XPD): ICD-10-PCS | Mod: 26,,, | Performed by: STUDENT IN AN ORGANIZED HEALTH CARE EDUCATION/TRAINING PROGRAM

## 2023-09-06 PROCEDURE — 93976 VASCULAR STUDY: CPT | Mod: 26,,, | Performed by: STUDENT IN AN ORGANIZED HEALTH CARE EDUCATION/TRAINING PROGRAM

## 2023-09-06 PROCEDURE — 76705 ECHO EXAM OF ABDOMEN: CPT | Mod: 26,59,, | Performed by: STUDENT IN AN ORGANIZED HEALTH CARE EDUCATION/TRAINING PROGRAM

## 2023-09-06 PROCEDURE — 76705 ECHO EXAM OF ABDOMEN: CPT | Mod: TC

## 2023-09-07 ENCOUNTER — PATIENT MESSAGE (OUTPATIENT)
Dept: TRANSPLANT | Facility: CLINIC | Age: 28
End: 2023-09-07
Payer: MEDICARE

## 2023-09-19 ENCOUNTER — PATIENT MESSAGE (OUTPATIENT)
Dept: TRANSPLANT | Facility: CLINIC | Age: 28
End: 2023-09-19
Payer: MEDICARE

## 2023-09-19 ENCOUNTER — LAB VISIT (OUTPATIENT)
Dept: LAB | Facility: HOSPITAL | Age: 28
End: 2023-09-19
Attending: INTERNAL MEDICINE
Payer: MEDICARE

## 2023-09-19 DIAGNOSIS — Z94.0 KIDNEY REPLACED BY TRANSPLANT: ICD-10-CM

## 2023-09-19 DIAGNOSIS — Z94.0 STATUS POST SIMULTANEOUS KIDNEY AND PANCREAS TRANSPLANT: ICD-10-CM

## 2023-09-19 DIAGNOSIS — Z94.83 STATUS POST SIMULTANEOUS KIDNEY AND PANCREAS TRANSPLANT: ICD-10-CM

## 2023-09-19 DIAGNOSIS — Z94.83 STATUS POST PANCREAS TRANSPLANTATION: ICD-10-CM

## 2023-09-19 LAB
ALBUMIN SERPL BCP-MCNC: 4.1 G/DL (ref 3.5–5.2)
AMYLASE SERPL-CCNC: 110 U/L (ref 20–110)
ANION GAP SERPL CALC-SCNC: 6 MMOL/L (ref 8–16)
BASOPHILS # BLD AUTO: 0.03 K/UL (ref 0–0.2)
BASOPHILS NFR BLD: 0.6 % (ref 0–1.9)
BUN SERPL-MCNC: 30 MG/DL (ref 6–20)
CALCIUM SERPL-MCNC: 9.9 MG/DL (ref 8.7–10.5)
CHLORIDE SERPL-SCNC: 114 MMOL/L (ref 95–110)
CO2 SERPL-SCNC: 20 MMOL/L (ref 23–29)
CREAT SERPL-MCNC: 0.9 MG/DL (ref 0.5–1.4)
DIFFERENTIAL METHOD: ABNORMAL
EOSINOPHIL # BLD AUTO: 0.1 K/UL (ref 0–0.5)
EOSINOPHIL NFR BLD: 3 % (ref 0–8)
ERYTHROCYTE [DISTWIDTH] IN BLOOD BY AUTOMATED COUNT: 12.9 % (ref 11.5–14.5)
EST. GFR  (NO RACE VARIABLE): >60 ML/MIN/1.73 M^2
GLUCOSE SERPL-MCNC: 87 MG/DL (ref 70–110)
HCT VFR BLD AUTO: 42.3 % (ref 37–48.5)
HGB BLD-MCNC: 13.5 G/DL (ref 12–16)
IMM GRANULOCYTES # BLD AUTO: 0.01 K/UL (ref 0–0.04)
IMM GRANULOCYTES NFR BLD AUTO: 0.2 % (ref 0–0.5)
LIPASE SERPL-CCNC: 19 U/L (ref 4–60)
LYMPHOCYTES # BLD AUTO: 1.3 K/UL (ref 1–4.8)
LYMPHOCYTES NFR BLD: 28.1 % (ref 18–48)
MCH RBC QN AUTO: 29.3 PG (ref 27–31)
MCHC RBC AUTO-ENTMCNC: 31.9 G/DL (ref 32–36)
MCV RBC AUTO: 92 FL (ref 82–98)
MONOCYTES # BLD AUTO: 0.6 K/UL (ref 0.3–1)
MONOCYTES NFR BLD: 11.7 % (ref 4–15)
NEUTROPHILS # BLD AUTO: 2.6 K/UL (ref 1.8–7.7)
NEUTROPHILS NFR BLD: 56.4 % (ref 38–73)
NRBC BLD-RTO: 0 /100 WBC
PHOSPHATE SERPL-MCNC: 3 MG/DL (ref 2.7–4.5)
PLATELET # BLD AUTO: 295 K/UL (ref 150–450)
PMV BLD AUTO: 10.4 FL (ref 9.2–12.9)
POTASSIUM SERPL-SCNC: 4.8 MMOL/L (ref 3.5–5.1)
RBC # BLD AUTO: 4.6 M/UL (ref 4–5.4)
SODIUM SERPL-SCNC: 140 MMOL/L (ref 136–145)
TACROLIMUS BLD-MCNC: 11.3 NG/ML (ref 5–15)
WBC # BLD AUTO: 4.69 K/UL (ref 3.9–12.7)

## 2023-09-19 PROCEDURE — 80197 ASSAY OF TACROLIMUS: CPT | Performed by: INTERNAL MEDICINE

## 2023-09-19 PROCEDURE — 83690 ASSAY OF LIPASE: CPT | Performed by: INTERNAL MEDICINE

## 2023-09-19 PROCEDURE — 80069 RENAL FUNCTION PANEL: CPT | Performed by: INTERNAL MEDICINE

## 2023-09-19 PROCEDURE — 36415 COLL VENOUS BLD VENIPUNCTURE: CPT | Performed by: INTERNAL MEDICINE

## 2023-09-19 PROCEDURE — 85025 COMPLETE CBC W/AUTO DIFF WBC: CPT | Performed by: INTERNAL MEDICINE

## 2023-09-19 PROCEDURE — 82150 ASSAY OF AMYLASE: CPT | Performed by: INTERNAL MEDICINE

## 2023-09-19 NOTE — TELEPHONE ENCOUNTER
Patient states understanding of dosage change.     ----- Message from Tammie Broussard DO sent at 9/19/2023 12:41 PM CDT -----  Near baseline kidney and pancreas graft function  High FK. Decrease FK to 4 BID from 5/4

## 2023-09-22 DIAGNOSIS — Z94.83 STATUS POST SIMULTANEOUS KIDNEY AND PANCREAS TRANSPLANT: ICD-10-CM

## 2023-09-22 DIAGNOSIS — Z94.0 STATUS POST SIMULTANEOUS KIDNEY AND PANCREAS TRANSPLANT: ICD-10-CM

## 2023-09-22 RX ORDER — TACROLIMUS 1 MG/1
4 CAPSULE ORAL EVERY MORNING
Qty: 120 CAPSULE | Refills: 11 | Status: SHIPPED | OUTPATIENT
Start: 2023-09-22 | End: 2023-09-22 | Stop reason: DRUGHIGH

## 2023-09-22 RX ORDER — TACROLIMUS 1 MG/1
4 CAPSULE ORAL EVERY 12 HOURS
Qty: 240 CAPSULE | Refills: 11 | Status: SHIPPED | OUTPATIENT
Start: 2023-09-22 | End: 2024-09-21

## 2023-09-23 NOTE — NURSING
Pt VSS. Pt denies pain. Pt states that bleeding is WNL. Pt BS was 143 @ 6AM. Pt has no complaints. Will continue with plan of care.    good balance

## 2023-10-04 ENCOUNTER — OFFICE VISIT (OUTPATIENT)
Dept: OPTOMETRY | Facility: CLINIC | Age: 28
End: 2023-10-04
Payer: MEDICARE

## 2023-10-04 DIAGNOSIS — E10.3521: ICD-10-CM

## 2023-10-04 DIAGNOSIS — H52.202 MYOPIA WITH ASTIGMATISM, LEFT: ICD-10-CM

## 2023-10-04 DIAGNOSIS — H40.211 ACUTE ANGLE-CLOSURE GLAUCOMA OF RIGHT EYE: Primary | ICD-10-CM

## 2023-10-04 DIAGNOSIS — H52.12 MYOPIA WITH ASTIGMATISM, LEFT: ICD-10-CM

## 2023-10-04 DIAGNOSIS — H54.512A: ICD-10-CM

## 2023-10-04 PROCEDURE — 92015 PR REFRACTION: ICD-10-PCS | Mod: S$GLB,,, | Performed by: OPTOMETRIST

## 2023-10-04 PROCEDURE — 3066F PR DOCUMENTATION OF TREATMENT FOR NEPHROPATHY: ICD-10-PCS | Mod: CPTII,S$GLB,, | Performed by: OPTOMETRIST

## 2023-10-04 PROCEDURE — 99999 PR PBB SHADOW E&M-EST. PATIENT-LVL III: ICD-10-PCS | Mod: PBBFAC,,, | Performed by: OPTOMETRIST

## 2023-10-04 PROCEDURE — 99999 PR PBB SHADOW E&M-EST. PATIENT-LVL III: CPT | Mod: PBBFAC,,, | Performed by: OPTOMETRIST

## 2023-10-04 PROCEDURE — 1159F PR MEDICATION LIST DOCUMENTED IN MEDICAL RECORD: ICD-10-PCS | Mod: CPTII,S$GLB,, | Performed by: OPTOMETRIST

## 2023-10-04 PROCEDURE — 1160F PR REVIEW ALL MEDS BY PRESCRIBER/CLIN PHARMACIST DOCUMENTED: ICD-10-PCS | Mod: CPTII,S$GLB,, | Performed by: OPTOMETRIST

## 2023-10-04 PROCEDURE — 92012 PR EYE EXAM, EST PATIENT,INTERMED: ICD-10-PCS | Mod: S$GLB,,, | Performed by: OPTOMETRIST

## 2023-10-04 PROCEDURE — 1160F RVW MEDS BY RX/DR IN RCRD: CPT | Mod: CPTII,S$GLB,, | Performed by: OPTOMETRIST

## 2023-10-04 PROCEDURE — 92015 DETERMINE REFRACTIVE STATE: CPT | Mod: S$GLB,,, | Performed by: OPTOMETRIST

## 2023-10-04 PROCEDURE — 3044F PR MOST RECENT HEMOGLOBIN A1C LEVEL <7.0%: ICD-10-PCS | Mod: CPTII,S$GLB,, | Performed by: OPTOMETRIST

## 2023-10-04 PROCEDURE — 1159F MED LIST DOCD IN RCRD: CPT | Mod: CPTII,S$GLB,, | Performed by: OPTOMETRIST

## 2023-10-04 PROCEDURE — 3066F NEPHROPATHY DOC TX: CPT | Mod: CPTII,S$GLB,, | Performed by: OPTOMETRIST

## 2023-10-04 PROCEDURE — 92012 INTRM OPH EXAM EST PATIENT: CPT | Mod: S$GLB,,, | Performed by: OPTOMETRIST

## 2023-10-04 PROCEDURE — 3044F HG A1C LEVEL LT 7.0%: CPT | Mod: CPTII,S$GLB,, | Performed by: OPTOMETRIST

## 2023-10-04 RX ORDER — HYDROCODONE BITARTRATE AND ACETAMINOPHEN 5; 325 MG/1; MG/1
1 TABLET ORAL EVERY 6 HOURS PRN
COMMUNITY
Start: 2023-08-18 | End: 2023-11-13

## 2023-10-04 NOTE — PROGRESS NOTES
HPI    [place]: 08/23 with Dr. Montaño  Chief complaint (CC): Patient is here for annual eye exam today.  Patient   wears one contact OS and feels the vision is about the same.  Patient does   not have any glasses.   Glasses? -  Contacts? +  H/o eye surgery, injections or laser: RD repair with vitrectomy and MP OD,   PRP OS  H/o eye injury: -  Known eye conditions? See above  Family h/o eye conditions? -  Eye gtts? -      (-) Flashes (-)  Floaters (-) Mucous   (-)  Tearing (-) Itching (-) Burning   (-) Headaches (-) Eye Pain/discomfort (-) Irritation   (-)  Redness (-) Double vision (-) Blurry vision    Diabetic? + pt. Doesn't monitor BS at home pt. States she is no longer   diabetic due to transplants  A1c? Hemoglobin A1C       Date                     Value               Ref Range             Status                08/01/2023               4.3                 4.0 - 5.6 %           Final                 05/15/2023               5.5                 4.0 - 5.6 %           Final                 02/07/2023               4.7                 4.0 - 5.6 %           Final                      Last edited by Marci Cortez on 10/4/2023  9:50 AM.            Assessment /Plan     For exam results, see Encounter Report.      Acute angle-closure glaucoma of right eye  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, associated with type 1 diabetes mellitus  Cont f/u w/Dr Montaño    Myopia with astigmatism, left  Category 2 low vision of right eye with normal vision of left eye  Discussed need for polycarbonate lenses. Pt was fit w/color CL at [facility] previously. Rec daily for special occasion use. Stressed the importance of glasses wear to help protect the good eye. CLRx and SRx released to patient. Patient educated on lens options. Normal ocular health. RTC 1 year for routine exam.

## 2023-10-06 ENCOUNTER — PATIENT MESSAGE (OUTPATIENT)
Dept: OPTOMETRY | Facility: CLINIC | Age: 28
End: 2023-10-06
Payer: MEDICARE

## 2023-10-09 ENCOUNTER — PATIENT MESSAGE (OUTPATIENT)
Dept: OPHTHALMOLOGY | Facility: CLINIC | Age: 28
End: 2023-10-09
Payer: MEDICARE

## 2023-10-12 ENCOUNTER — OFFICE VISIT (OUTPATIENT)
Dept: OPHTHALMOLOGY | Facility: CLINIC | Age: 28
End: 2023-10-12
Payer: MEDICARE

## 2023-10-12 DIAGNOSIS — Z98.890 SILICONE OIL IN EYE AFTER VITRECTOMY: ICD-10-CM

## 2023-10-12 DIAGNOSIS — E10.3541: Primary | ICD-10-CM

## 2023-10-12 DIAGNOSIS — H35.21 PROLIFERATIVE VITREORETINOPATHY OF RIGHT EYE: ICD-10-CM

## 2023-10-12 DIAGNOSIS — H27.01 APHAKIA, RIGHT: ICD-10-CM

## 2023-10-12 DIAGNOSIS — E10.3592 TYPE 1 DIABETES MELLITUS WITH PROLIFERATIVE RETINOPATHY OF LEFT EYE WITHOUT MACULAR EDEMA: ICD-10-CM

## 2023-10-12 DIAGNOSIS — H44.709 SILICONE OIL IN EYE AFTER VITRECTOMY: ICD-10-CM

## 2023-10-12 PROCEDURE — 3066F NEPHROPATHY DOC TX: CPT | Mod: CPTII,S$GLB,, | Performed by: OPHTHALMOLOGY

## 2023-10-12 PROCEDURE — 99214 PR OFFICE/OUTPT VISIT, EST, LEVL IV, 30-39 MIN: ICD-10-PCS | Mod: S$GLB,,, | Performed by: OPHTHALMOLOGY

## 2023-10-12 PROCEDURE — 3044F HG A1C LEVEL LT 7.0%: CPT | Mod: CPTII,S$GLB,, | Performed by: OPHTHALMOLOGY

## 2023-10-12 PROCEDURE — 92134 CPTRZ OPH DX IMG PST SGM RTA: CPT | Mod: S$GLB,,, | Performed by: OPHTHALMOLOGY

## 2023-10-12 PROCEDURE — 2022F PR DILATED RETINAL EYE EXAM WITH INTERP/REVIEW: ICD-10-PCS | Mod: CPTII,S$GLB,, | Performed by: OPHTHALMOLOGY

## 2023-10-12 PROCEDURE — 99214 OFFICE O/P EST MOD 30 MIN: CPT | Mod: S$GLB,,, | Performed by: OPHTHALMOLOGY

## 2023-10-12 PROCEDURE — 99999 PR PBB SHADOW E&M-EST. PATIENT-LVL III: ICD-10-PCS | Mod: PBBFAC,,, | Performed by: OPHTHALMOLOGY

## 2023-10-12 PROCEDURE — 1159F PR MEDICATION LIST DOCUMENTED IN MEDICAL RECORD: ICD-10-PCS | Mod: CPTII,S$GLB,, | Performed by: OPHTHALMOLOGY

## 2023-10-12 PROCEDURE — 2022F DILAT RTA XM EVC RTNOPTHY: CPT | Mod: CPTII,S$GLB,, | Performed by: OPHTHALMOLOGY

## 2023-10-12 PROCEDURE — 92134 POSTERIOR SEGMENT OCT RETINA (OCULAR COHERENCE TOMOGRAPHY)-BOTH EYES: ICD-10-PCS | Mod: S$GLB,,, | Performed by: OPHTHALMOLOGY

## 2023-10-12 PROCEDURE — 3044F PR MOST RECENT HEMOGLOBIN A1C LEVEL <7.0%: ICD-10-PCS | Mod: CPTII,S$GLB,, | Performed by: OPHTHALMOLOGY

## 2023-10-12 PROCEDURE — 3066F PR DOCUMENTATION OF TREATMENT FOR NEPHROPATHY: ICD-10-PCS | Mod: CPTII,S$GLB,, | Performed by: OPHTHALMOLOGY

## 2023-10-12 PROCEDURE — 92201 OPSCPY EXTND RTA DRAW UNI/BI: CPT | Mod: S$GLB,,, | Performed by: OPHTHALMOLOGY

## 2023-10-12 PROCEDURE — 1159F MED LIST DOCD IN RCRD: CPT | Mod: CPTII,S$GLB,, | Performed by: OPHTHALMOLOGY

## 2023-10-12 PROCEDURE — 99999 PR PBB SHADOW E&M-EST. PATIENT-LVL III: CPT | Mod: PBBFAC,,, | Performed by: OPHTHALMOLOGY

## 2023-10-12 PROCEDURE — 92201 PR OPHTHALMOSCOPY, EXT, W/RET DRAW/SCLERAL DEPR, I&R, UNI/BI: ICD-10-PCS | Mod: S$GLB,,, | Performed by: OPHTHALMOLOGY

## 2023-10-12 NOTE — PROGRESS NOTES
Subjective:       Patient ID: Isabela Read is a 28 y.o. female      Chief Complaint   Patient presents with    Diabetes     History of Present Illness  HPI    Overdue F/U /DFE  DLS- 08/21/2023 Dr. Montaño     Pt sts no change in vision since last visit. OD feels strained all the   time throughout the day.  Denies any eye pain just eye strain / fatigue.   (-)Flashes (-)Floaters    (-)Photophobia  (-)Glare    No gtts   Last edited by Maximilian Montaño MD on 10/12/2023  5:21 PM.        Imaging:    See report    Assessment/Plan:     1. Type 1 diabetes mellitus with right eye affected by proliferative retinopathy and combined traction and rhegmatogenous retinal detachment  - Posterior Segment OCT Retina-Both eyes  2. Proliferative vitreoretinopathy of right eye  3. Aphakia, right  4. Silicone oil in eye after vitrectomy  Retina attached.  No traction noted.  SO in place  Visual potential unclear.  Tried to get CL refraction but optometrist said could not be improved sig with phoropter  Discussed continued observation since SO not causing problem vs SOR and secondary IOL  RBA discussed including replacing SO and RD.  Pt wishes to have surgery    RBA discussed in detail, inc surgical failure, need for more surgery, loss of vision/eye, no recovery of vision, surgical failure, bleeding, infection, high eye pressure (glaucoma), retina detachment, etc.  Pt wishes to proceed.    IOL Master today  25 ga PPV/SOR/sulcus IOL OD and PRP OS  GA    5. Type 1 diabetes mellitus with proliferative retinopathy of left eye without macular edema  S/p PRP.  No traction.    Pt relatively intolerant of laser in clinic and last session had less PRP than hoped for   Recommend PRP in OR  Pt agrees    - Posterior Segment OCT Retina-Both eyes    Follow up in about 3 weeks (around 11/2/2023), or if symptoms worsen or fail to improve, for Surgery.

## 2023-10-17 ENCOUNTER — LAB VISIT (OUTPATIENT)
Dept: LAB | Facility: HOSPITAL | Age: 28
End: 2023-10-17
Attending: INTERNAL MEDICINE
Payer: MEDICARE

## 2023-10-17 DIAGNOSIS — Z94.83 STATUS POST PANCREAS TRANSPLANTATION: ICD-10-CM

## 2023-10-17 DIAGNOSIS — Z94.0 KIDNEY REPLACED BY TRANSPLANT: ICD-10-CM

## 2023-10-17 LAB
ALBUMIN SERPL BCP-MCNC: 4 G/DL (ref 3.5–5.2)
AMYLASE SERPL-CCNC: 116 U/L (ref 20–110)
ANION GAP SERPL CALC-SCNC: 8 MMOL/L (ref 8–16)
BASOPHILS # BLD AUTO: 0.03 K/UL (ref 0–0.2)
BASOPHILS NFR BLD: 0.6 % (ref 0–1.9)
BUN SERPL-MCNC: 31 MG/DL (ref 6–20)
CALCIUM SERPL-MCNC: 10 MG/DL (ref 8.7–10.5)
CHLORIDE SERPL-SCNC: 110 MMOL/L (ref 95–110)
CO2 SERPL-SCNC: 22 MMOL/L (ref 23–29)
CREAT SERPL-MCNC: 0.9 MG/DL (ref 0.5–1.4)
DIFFERENTIAL METHOD: ABNORMAL
EOSINOPHIL # BLD AUTO: 0.1 K/UL (ref 0–0.5)
EOSINOPHIL NFR BLD: 2.5 % (ref 0–8)
ERYTHROCYTE [DISTWIDTH] IN BLOOD BY AUTOMATED COUNT: 13 % (ref 11.5–14.5)
EST. GFR  (NO RACE VARIABLE): >60 ML/MIN/1.73 M^2
GLUCOSE SERPL-MCNC: 77 MG/DL (ref 70–110)
HCT VFR BLD AUTO: 44.1 % (ref 37–48.5)
HGB BLD-MCNC: 13.9 G/DL (ref 12–16)
IMM GRANULOCYTES # BLD AUTO: 0.01 K/UL (ref 0–0.04)
IMM GRANULOCYTES NFR BLD AUTO: 0.2 % (ref 0–0.5)
LIPASE SERPL-CCNC: 21 U/L (ref 4–60)
LYMPHOCYTES # BLD AUTO: 1.1 K/UL (ref 1–4.8)
LYMPHOCYTES NFR BLD: 23.4 % (ref 18–48)
MCH RBC QN AUTO: 29.4 PG (ref 27–31)
MCHC RBC AUTO-ENTMCNC: 31.5 G/DL (ref 32–36)
MCV RBC AUTO: 93 FL (ref 82–98)
MONOCYTES # BLD AUTO: 0.6 K/UL (ref 0.3–1)
MONOCYTES NFR BLD: 13.1 % (ref 4–15)
NEUTROPHILS # BLD AUTO: 2.9 K/UL (ref 1.8–7.7)
NEUTROPHILS NFR BLD: 60.2 % (ref 38–73)
NRBC BLD-RTO: 0 /100 WBC
PHOSPHATE SERPL-MCNC: 3.2 MG/DL (ref 2.7–4.5)
PLATELET # BLD AUTO: 278 K/UL (ref 150–450)
PMV BLD AUTO: 11.2 FL (ref 9.2–12.9)
POTASSIUM SERPL-SCNC: 4.7 MMOL/L (ref 3.5–5.1)
RBC # BLD AUTO: 4.72 M/UL (ref 4–5.4)
SODIUM SERPL-SCNC: 140 MMOL/L (ref 136–145)
TACROLIMUS BLD-MCNC: 7.7 NG/ML (ref 5–15)
WBC # BLD AUTO: 4.88 K/UL (ref 3.9–12.7)

## 2023-10-17 PROCEDURE — 83690 ASSAY OF LIPASE: CPT | Performed by: INTERNAL MEDICINE

## 2023-10-17 PROCEDURE — 80069 RENAL FUNCTION PANEL: CPT | Performed by: INTERNAL MEDICINE

## 2023-10-17 PROCEDURE — 80197 ASSAY OF TACROLIMUS: CPT | Performed by: INTERNAL MEDICINE

## 2023-10-17 PROCEDURE — 36415 COLL VENOUS BLD VENIPUNCTURE: CPT | Performed by: INTERNAL MEDICINE

## 2023-10-17 PROCEDURE — 85025 COMPLETE CBC W/AUTO DIFF WBC: CPT | Performed by: INTERNAL MEDICINE

## 2023-10-17 PROCEDURE — 82150 ASSAY OF AMYLASE: CPT | Performed by: INTERNAL MEDICINE

## 2023-10-19 ENCOUNTER — TELEPHONE (OUTPATIENT)
Dept: OPHTHALMOLOGY | Facility: CLINIC | Age: 28
End: 2023-10-19
Payer: MEDICARE

## 2023-10-19 DIAGNOSIS — E10.3541: Primary | ICD-10-CM

## 2023-11-09 NOTE — PROGRESS NOTES
Kidney/Pancreas Post-Transplant Assessment    Referring Physician: Oswald Kruger  Current Nephrologist: Tai Camilo    ORGAN: PANCREAS  Donor Type: donation after brain death  PHS Increased Risk: no  Cold Ischemia: 310 mins  Induction Medications:     Subjective:     CC:  Reassessment of renal allograft function and management of chronic immunosuppression.    HPI:  Ms. Read is a 28 y.o. 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 Thymo induction). Post transplant course complicated by N/V/GEORGE with urinary retention s/p dc placement (s/p removal), CMV infection as well as early kidney biopsy concerning for ABMR with negative DSA treated with IVIG.  She has CKD stage 2 - GFR 60-89 and her baseline creatinine is between 0.9-1.0. She takes mycophenolate mofetil, prednisone, and tacrolimus for maintenance immunosuppression. She denies any recent hospitalizations or ER visits since her previous clinic visit.    Has not re-established with general nephrology yet, previously seeing Dr. Kruger. Scheduled for upcoming retina surgery.     Feels well without complaints. Staying hydrated, no problems with urination. BP at goal, no peripheral edema. Tolerating IS without issues, no diarrhea or vomiting.       Current Outpatient Medications   Medication Sig Dispense Refill    ketoconazole (NIZORAL) 2 % cream Apply topically 2 (two) times daily as needed for dry areas of face 60 g 1    medroxyPROGESTERone (DEPO-PROVERA) 150 mg/mL Syrg Inject 1 mL (150 mg total) into the muscle once. for 1 dose 1 mL 3    mycophenolate (CELLCEPT) 250 mg Cap Take 2 capsules (500 mg total) by mouth 2 (two) times daily. 120 capsule 11    promethazine (PHENERGAN) 12.5 MG Tab Take 1 tablet (12.5 mg total) by mouth every 6 (six) hours as needed (nausea). 30 tablet 1    sodium bicarbonate 650 MG tablet Take 2 tablets (1,300 mg  total) by mouth 3 (three) times daily. 180 tablet 11    tacrolimus (PROGRAF) 1 MG Cap Take 4 capsules (4 mg total) by mouth every 12 (twelve) hours. 240 capsule 11    tretinoin (RETIN-A) 0.05 % cream Apply topically every evening. Start with every other night and move up to nightly after 2 weeks if not too dry. 20 g 1    tretinoin (RETIN-A) 0.1 % cream Apply topically every evening. Increase Retin A to 0.1%.  Rotate this with old one until weaker strength runs out and then go nightly with 0.1%. 20 g 3    aspirin (ECOTRIN) 81 MG EC tablet Take 81 mg by mouth once daily.      predniSONE (DELTASONE) 5 MG tablet Take 1 tablet (5 mg total) by mouth once daily. 30 tablet 11     No current facility-administered medications for this visit.       Past Medical History:   Diagnosis Date    Acute cystitis without hematuria 11/25/2022    Acute kidney injury superimposed on chronic kidney disease 4/7/2021    Anemia     Anemia in ESRD (end-stage renal disease) 7/29/2020    Lab Results  Component Value Date   WBC 7.23 07/29/2020   HGB 7.1 (L) 07/29/2020   HCT 22.2 (L) 07/29/2020   MCV 91 07/29/2020    (H) 07/29/2020   Following with hematology and scheduled to undergo iron infusions    Bacteremia due to Escherichia coli 1/23/2023    Bilious vomiting with nausea 12/12/2022    Chronic hypertension with exacerbation during pregnancy in second trimester 11/6/2020    Current regimen (11/6/20):  - Carvedilol 12.5 mg BID - Nifedipine 30 mg daily Baseline CKD + proteinuria    Chronic kidney disease     Depression     Diabetes mellitus     Diabetic retinopathy     Diarrhea     Encounter for blood transfusion     End stage renal failure on dialysis     Fever 12/1/2022    Fever of undetermined origin 12/12/2022    Gastroparesis     Glaucoma     Hepatomegaly 4/29/2021    History of diabetes mellitus, type I 12/20/2022    Hx of psychiatric care     Hyperlipidemia     Hypertension     Nausea and vomiting 12/12/2022    Nephrotic syndrome      Nephrotic syndrome 3/4/2021    Nonspecific reaction to tuberculin skin test without active tuberculosis 10/1/2021    Orthostatic hypotension 1/25/2023    Palpitations     Poor fetal growth affecting management of mother in second trimester 10/15/2020    Pyelonephritis, acute 11/26/2022    Restrictive lung disease     Retinal detachment     Sepsis 12/1/2022    Sepsis 11/25/2022    Sepsis due to Escherichia coli 1/23/2023    Severe pre-eclampsia in second trimester 11/6/2020    Severe sepsis 11/25/2022    SIRS (systemic inflammatory response syndrome) 12/6/2022    Status post pancreas transplantation 11/26/2022 11/2/2022    Type 1 diabetes mellitus with end-stage renal disease (ESRD) 2/24/2015    Followed by Dr. Mayo.  Last A1C was 13  Currently on lantus 20 units qAM and humolog 10 units with meals  - a fetal echocardiogram around 22-24 weeks - needs eye exam, podiatry exam  - needs EKG, maternal echo and fu with cardiology  - ASA 81mg, folic acid 4mg PO daily - hemoglobin A1c every 4-6 weeks -24 hour urine for protein and creatinine clearance should be performed. Patient will also need a       Review of Systems   Constitutional:  Negative for activity change, appetite change and fever.   HENT:  Negative for congestion, mouth sores and sore throat.    Eyes:  Positive for visual disturbance.   Respiratory:  Negative for cough, chest tightness and shortness of breath.    Cardiovascular:  Negative for chest pain, palpitations and leg swelling.   Gastrointestinal:  Negative for abdominal distention, constipation, diarrhea and nausea.   Genitourinary:  Negative for difficulty urinating, frequency and hematuria.   Musculoskeletal:  Negative for arthralgias, gait problem and myalgias.   Skin:  Negative for wound.   Allergic/Immunologic: Positive for immunocompromised state. Negative for environmental allergies and food allergies.   Neurological:  Negative for dizziness, weakness and numbness.   Psychiatric/Behavioral:  " Negative for sleep disturbance. The patient is not nervous/anxious.        Objective:     Blood pressure 129/78, pulse 109, temperature 97.7 °F (36.5 °C), temperature source Skin, resp. rate 18, height 5' 4" (1.626 m), weight 75.5 kg (166 lb 7.2 oz), SpO2 100 %.body mass index is 28.57 kg/m².    Physical Exam  Vitals and nursing note reviewed.   Constitutional:       Appearance: Normal appearance.   HENT:      Head: Normocephalic.   Cardiovascular:      Rate and Rhythm: Normal rate and regular rhythm.      Heart sounds: Normal heart sounds.   Pulmonary:      Effort: Pulmonary effort is normal.      Breath sounds: Normal breath sounds.   Abdominal:      General: Bowel sounds are normal. There is no distension.      Palpations: Abdomen is soft.      Tenderness: There is no abdominal tenderness.   Musculoskeletal:         General: Normal range of motion.   Skin:     General: Skin is warm and dry.   Neurological:      General: No focal deficit present.      Mental Status: She is alert.   Psychiatric:         Behavior: Behavior normal.         Labs:  Lab Results   Component Value Date    WBC 4.44 11/13/2023    HGB 13.6 11/13/2023    HCT 43.0 11/13/2023     10/17/2023    K 4.7 10/17/2023     10/17/2023    CO2 22 (L) 10/17/2023    BUN 31 (H) 10/17/2023    CREATININE 0.9 10/17/2023    EGFRNORACEVR >60.0 10/17/2023    CALCIUM 10.0 10/17/2023    PHOS 3.2 10/17/2023    MG 1.9 08/01/2023    ALBUMIN 4.0 10/17/2023    AST 20 08/01/2023    ALT 27 08/01/2023    UTPCR 0.33 (H) 01/03/2023    .3 (H) 12/01/2022    TACROLIMUS 7.7 10/17/2023     Labs were reviewed with the patient.    Assessment:     1. Status post simultaneous kidney and pancreas transplant    2. CKD (chronic kidney disease), stage II    3. Long-term use of immunosuppressant medication    4. Renovascular hypertension    5. At risk for opportunistic infections      Plan:   Morning lab work pending at the time of visit  Needs to re-establish care with " general nephrology for CKD care, plans to return to Dr. Kruger      1. Immunosuppression: Prograf trough 7.7. Continue Prograf 4/4,  mg BID, and Prednisone 5 mg QD. On depo shots for contraception, no plans for pregnancy at this time. She understands that she cannot become pregnant while on MMF. Will continue to monitor for drug toxicities    2. Allograft Function: Stable. Slight elevation in amylase but otherwise normal.  - history of DM1 s/p received a donation after brain death kidney and pancreas transplant on 11/3/22 (0% PRA. Thymo induction).   - Post transplant course complicated by N/V/GEORGE with urinary retention s/p dc placement (s/p removal), recent CMV infection as well as early kidney biopsy concerning for ABMR with negative DSA treated with IVIG.    - baseline creatinine is between 0.9-1.0.   Lab Results   Component Value Date    CREATININE 0.9 10/17/2023      Latest Reference Range & Units 11mo 2wk,  Kidney,Pancreas-Post 1 Year  10/17/23 09:20   eGFR >60 mL/min/1.73 m^2 >60.0      Latest Reference Range & Units 11mo 2wk,  Kidney,Pancreas-Post 1 Year  10/17/23 09:20   Amylase 20 - 110 U/L 116 (H)   Lipase 4 - 60 U/L 21       3. Hypertension management: advise low salt diet and home BP monitoring      4. Metabolic Bone Disease/Secondary Hyperparathyroidism:  Lab Results   Component Value Date    .3 (H) 12/01/2022    CALCIUM 10.0 10/17/2023    CAION 1.02 (L) 11/03/2022    PHOS 3.2 10/17/2023       5. Electrolytes:  Will monitor /guidelines  Lab Results   Component Value Date     10/17/2023    K 4.7 10/17/2023     10/17/2023    CO2 22 (L) 10/17/2023       6. Anemia: stable. No need for intervention   Lab Results   Component Value Date    WBC 4.44 11/13/2023    HGB 13.6 11/13/2023    HCT 43.0 11/13/2023    MCV 92 11/13/2023     11/13/2023         7. Cytopenias: no significant cytopenias. Medicine list reviewed including potential causes of drug-induced cytopenias    8.   Proteinuria: continue p/c ratio as per guidelines     9. BK virus infection screening:  will continue to monitor per guidelines    10. Weight education: provided during the clinic visit   Body mass index is 28.57 kg/m².     11.Patient safety education regarding immunosuppression including prophylaxis posttransplant for CMV, PCP : Education provided about vaccination and prevention of infections            Follow-up:   Clinic: return to transplant clinic weekly for the first month after transplant; every 2 weeks during months 2-3; then at 6-, 9-, 12-, 18-, 24-, and 36- months post-transplant to reassess for complications from immunosuppression toxicity and monitor for rejection.  Annually thereafter.    Labs: since patient remains at high risk for rejection and drug-related complications that warrant close monitoring, labs will be ordered as follows: continue twice weekly CBC, renal panel, and drug level for first month; then same labs once weekly through 3rd month post-transplant.  Urine for UA and protein/creatinine ratio monthly.  Serum BK - PCR at 1-, 3-, 6-, 9-, 12-, 18-, 24-, 36- 48-, and 60 months post-transplant.  Hepatic panel at 1-, 2-, 3-, 6-, 9-, 12-, 18-, 24-, and 36- months post-transplant.    Marissa Pederson NP

## 2023-11-13 ENCOUNTER — PATIENT MESSAGE (OUTPATIENT)
Dept: TRANSPLANT | Facility: CLINIC | Age: 28
End: 2023-11-13

## 2023-11-13 ENCOUNTER — LAB VISIT (OUTPATIENT)
Dept: LAB | Facility: HOSPITAL | Age: 28
End: 2023-11-13
Attending: INTERNAL MEDICINE
Payer: MEDICARE

## 2023-11-13 ENCOUNTER — OFFICE VISIT (OUTPATIENT)
Dept: TRANSPLANT | Facility: CLINIC | Age: 28
End: 2023-11-13
Payer: MEDICARE

## 2023-11-13 VITALS
RESPIRATION RATE: 18 BRPM | DIASTOLIC BLOOD PRESSURE: 78 MMHG | SYSTOLIC BLOOD PRESSURE: 129 MMHG | BODY MASS INDEX: 28.42 KG/M2 | HEART RATE: 109 BPM | TEMPERATURE: 98 F | OXYGEN SATURATION: 100 % | HEIGHT: 64 IN | WEIGHT: 166.44 LBS

## 2023-11-13 DIAGNOSIS — Z94.83 STATUS POST PANCREAS TRANSPLANTATION: ICD-10-CM

## 2023-11-13 DIAGNOSIS — Z91.89 AT RISK FOR OPPORTUNISTIC INFECTIONS: ICD-10-CM

## 2023-11-13 DIAGNOSIS — Z94.83 STATUS POST SIMULTANEOUS KIDNEY AND PANCREAS TRANSPLANT: Primary | ICD-10-CM

## 2023-11-13 DIAGNOSIS — E10.29 TYPE 1 DIABETES MELLITUS WITH OTHER DIABETIC KIDNEY COMPLICATION: ICD-10-CM

## 2023-11-13 DIAGNOSIS — N18.2 CKD (CHRONIC KIDNEY DISEASE), STAGE II: ICD-10-CM

## 2023-11-13 DIAGNOSIS — I15.0 RENOVASCULAR HYPERTENSION: ICD-10-CM

## 2023-11-13 DIAGNOSIS — Z79.60 LONG-TERM USE OF IMMUNOSUPPRESSANT MEDICATION: ICD-10-CM

## 2023-11-13 DIAGNOSIS — Z94.0 STATUS POST SIMULTANEOUS KIDNEY AND PANCREAS TRANSPLANT: Primary | ICD-10-CM

## 2023-11-13 DIAGNOSIS — Z94.0 KIDNEY REPLACED BY TRANSPLANT: ICD-10-CM

## 2023-11-13 LAB
ALBUMIN SERPL BCP-MCNC: 4 G/DL (ref 3.5–5.2)
ALBUMIN SERPL BCP-MCNC: 4 G/DL (ref 3.5–5.2)
ALP SERPL-CCNC: 73 U/L (ref 55–135)
ALT SERPL W/O P-5'-P-CCNC: 31 U/L (ref 10–44)
AMYLASE SERPL-CCNC: 120 U/L (ref 20–110)
ANION GAP SERPL CALC-SCNC: 8 MMOL/L (ref 8–16)
AST SERPL-CCNC: 20 U/L (ref 10–40)
BASOPHILS # BLD AUTO: 0.02 K/UL (ref 0–0.2)
BASOPHILS NFR BLD: 0.5 % (ref 0–1.9)
BILIRUB DIRECT SERPL-MCNC: 0.1 MG/DL (ref 0.1–0.3)
BILIRUB SERPL-MCNC: 0.3 MG/DL (ref 0.1–1)
BUN SERPL-MCNC: 31 MG/DL (ref 6–20)
C PEPTIDE SERPL-MCNC: 6 NG/ML (ref 0.78–5.19)
CALCIUM SERPL-MCNC: 9.7 MG/DL (ref 8.7–10.5)
CHLORIDE SERPL-SCNC: 110 MMOL/L (ref 95–110)
CO2 SERPL-SCNC: 24 MMOL/L (ref 23–29)
CREAT SERPL-MCNC: 1 MG/DL (ref 0.5–1.4)
DIFFERENTIAL METHOD: ABNORMAL
EOSINOPHIL # BLD AUTO: 0.1 K/UL (ref 0–0.5)
EOSINOPHIL NFR BLD: 2.7 % (ref 0–8)
ERYTHROCYTE [DISTWIDTH] IN BLOOD BY AUTOMATED COUNT: 13.6 % (ref 11.5–14.5)
EST. GFR  (NO RACE VARIABLE): >60 ML/MIN/1.73 M^2
ESTIMATED AVG GLUCOSE: 126 MG/DL (ref 68–131)
GLUCOSE SERPL-MCNC: 79 MG/DL (ref 70–110)
HBA1C MFR BLD: 6 % (ref 4–5.6)
HCT VFR BLD AUTO: 43 % (ref 37–48.5)
HGB BLD-MCNC: 13.6 G/DL (ref 12–16)
IMM GRANULOCYTES # BLD AUTO: 0.02 K/UL (ref 0–0.04)
IMM GRANULOCYTES NFR BLD AUTO: 0.5 % (ref 0–0.5)
LIPASE SERPL-CCNC: 25 U/L (ref 4–60)
LYMPHOCYTES # BLD AUTO: 1.2 K/UL (ref 1–4.8)
LYMPHOCYTES NFR BLD: 27.7 % (ref 18–48)
MAGNESIUM SERPL-MCNC: 1.7 MG/DL (ref 1.6–2.6)
MCH RBC QN AUTO: 29 PG (ref 27–31)
MCHC RBC AUTO-ENTMCNC: 31.6 G/DL (ref 32–36)
MCV RBC AUTO: 92 FL (ref 82–98)
MONOCYTES # BLD AUTO: 0.5 K/UL (ref 0.3–1)
MONOCYTES NFR BLD: 11 % (ref 4–15)
NEUTROPHILS # BLD AUTO: 2.6 K/UL (ref 1.8–7.7)
NEUTROPHILS NFR BLD: 57.6 % (ref 38–73)
NRBC BLD-RTO: 0 /100 WBC
PHOSPHATE SERPL-MCNC: 3.2 MG/DL (ref 2.7–4.5)
PLATELET # BLD AUTO: 290 K/UL (ref 150–450)
PMV BLD AUTO: 11 FL (ref 9.2–12.9)
POTASSIUM SERPL-SCNC: 4.5 MMOL/L (ref 3.5–5.1)
PROT SERPL-MCNC: 7.7 G/DL (ref 6–8.4)
RBC # BLD AUTO: 4.69 M/UL (ref 4–5.4)
SODIUM SERPL-SCNC: 142 MMOL/L (ref 136–145)
TACROLIMUS BLD-MCNC: 9.2 NG/ML (ref 5–15)
WBC # BLD AUTO: 4.44 K/UL (ref 3.9–12.7)

## 2023-11-13 PROCEDURE — 99999 PR PBB SHADOW E&M-EST. PATIENT-LVL III: CPT | Mod: PBBFAC,,, | Performed by: NURSE PRACTITIONER

## 2023-11-13 PROCEDURE — 1159F PR MEDICATION LIST DOCUMENTED IN MEDICAL RECORD: ICD-10-PCS | Mod: CPTII,S$GLB,, | Performed by: NURSE PRACTITIONER

## 2023-11-13 PROCEDURE — 3078F PR MOST RECENT DIASTOLIC BLOOD PRESSURE < 80 MM HG: ICD-10-PCS | Mod: CPTII,S$GLB,, | Performed by: NURSE PRACTITIONER

## 2023-11-13 PROCEDURE — 99215 OFFICE O/P EST HI 40 MIN: CPT | Mod: S$GLB,,, | Performed by: NURSE PRACTITIONER

## 2023-11-13 PROCEDURE — 1160F RVW MEDS BY RX/DR IN RCRD: CPT | Mod: CPTII,S$GLB,, | Performed by: NURSE PRACTITIONER

## 2023-11-13 PROCEDURE — 3074F PR MOST RECENT SYSTOLIC BLOOD PRESSURE < 130 MM HG: ICD-10-PCS | Mod: CPTII,S$GLB,, | Performed by: NURSE PRACTITIONER

## 2023-11-13 PROCEDURE — 99999 PR PBB SHADOW E&M-EST. PATIENT-LVL III: ICD-10-PCS | Mod: PBBFAC,,, | Performed by: NURSE PRACTITIONER

## 2023-11-13 PROCEDURE — 3008F BODY MASS INDEX DOCD: CPT | Mod: CPTII,S$GLB,, | Performed by: NURSE PRACTITIONER

## 2023-11-13 PROCEDURE — 85025 COMPLETE CBC W/AUTO DIFF WBC: CPT | Performed by: INTERNAL MEDICINE

## 2023-11-13 PROCEDURE — 3066F NEPHROPATHY DOC TX: CPT | Mod: CPTII,S$GLB,, | Performed by: NURSE PRACTITIONER

## 2023-11-13 PROCEDURE — 3066F PR DOCUMENTATION OF TREATMENT FOR NEPHROPATHY: ICD-10-PCS | Mod: CPTII,S$GLB,, | Performed by: NURSE PRACTITIONER

## 2023-11-13 PROCEDURE — 36415 COLL VENOUS BLD VENIPUNCTURE: CPT | Performed by: INTERNAL MEDICINE

## 2023-11-13 PROCEDURE — 3008F PR BODY MASS INDEX (BMI) DOCUMENTED: ICD-10-PCS | Mod: CPTII,S$GLB,, | Performed by: NURSE PRACTITIONER

## 2023-11-13 PROCEDURE — 84075 ASSAY ALKALINE PHOSPHATASE: CPT | Performed by: INTERNAL MEDICINE

## 2023-11-13 PROCEDURE — 1160F PR REVIEW ALL MEDS BY PRESCRIBER/CLIN PHARMACIST DOCUMENTED: ICD-10-PCS | Mod: CPTII,S$GLB,, | Performed by: NURSE PRACTITIONER

## 2023-11-13 PROCEDURE — 82150 ASSAY OF AMYLASE: CPT | Performed by: INTERNAL MEDICINE

## 2023-11-13 PROCEDURE — 87799 DETECT AGENT NOS DNA QUANT: CPT | Performed by: INTERNAL MEDICINE

## 2023-11-13 PROCEDURE — 3044F HG A1C LEVEL LT 7.0%: CPT | Mod: CPTII,S$GLB,, | Performed by: NURSE PRACTITIONER

## 2023-11-13 PROCEDURE — 80069 RENAL FUNCTION PANEL: CPT | Performed by: INTERNAL MEDICINE

## 2023-11-13 PROCEDURE — 3078F DIAST BP <80 MM HG: CPT | Mod: CPTII,S$GLB,, | Performed by: NURSE PRACTITIONER

## 2023-11-13 PROCEDURE — 3074F SYST BP LT 130 MM HG: CPT | Mod: CPTII,S$GLB,, | Performed by: NURSE PRACTITIONER

## 2023-11-13 PROCEDURE — 83690 ASSAY OF LIPASE: CPT | Performed by: INTERNAL MEDICINE

## 2023-11-13 PROCEDURE — 80197 ASSAY OF TACROLIMUS: CPT | Performed by: INTERNAL MEDICINE

## 2023-11-13 PROCEDURE — 83036 HEMOGLOBIN GLYCOSYLATED A1C: CPT | Performed by: INTERNAL MEDICINE

## 2023-11-13 PROCEDURE — 99215 PR OFFICE/OUTPT VISIT, EST, LEVL V, 40-54 MIN: ICD-10-PCS | Mod: S$GLB,,, | Performed by: NURSE PRACTITIONER

## 2023-11-13 PROCEDURE — 3044F PR MOST RECENT HEMOGLOBIN A1C LEVEL <7.0%: ICD-10-PCS | Mod: CPTII,S$GLB,, | Performed by: NURSE PRACTITIONER

## 2023-11-13 PROCEDURE — 1159F MED LIST DOCD IN RCRD: CPT | Mod: CPTII,S$GLB,, | Performed by: NURSE PRACTITIONER

## 2023-11-13 PROCEDURE — 83735 ASSAY OF MAGNESIUM: CPT | Performed by: INTERNAL MEDICINE

## 2023-11-13 PROCEDURE — 84681 ASSAY OF C-PEPTIDE: CPT | Performed by: INTERNAL MEDICINE

## 2023-11-13 RX ORDER — PREDNISONE 5 MG/1
5 TABLET ORAL DAILY
Qty: 30 TABLET | Refills: 11 | Status: SHIPPED | OUTPATIENT
Start: 2023-11-13

## 2023-11-13 NOTE — LETTER
November 13, 2023        Tai Camilo  [address]  [facility] [address]  Phone: [phone]  Fax: [phone]             [facility] 1st Fl  [address]  [facility] [address]  Phone: [phone]   Patient: Isabela Read   MR Number: [id number]   YOB: 1995   Date of Visit: 11/13/2023       Dear Dr. Tai Camilo    Thank you for referring Isabela Read to me for evaluation. Attached you will find relevant portions of my assessment and plan of care.    If you have questions, please do not hesitate to call me. I look forward to following Isabela Read along with you.    Sincerely,    Marissa Pederson, MARILYN    Enclosure    If you would like to receive this communication electronically, please contact [email or web address] or [phone] to request [employer] access.    [employer] is a tool which provides read-only access to select patient information with whom you have a relationship. Its easy to use and provides real time access to review your patients record including encounter summaries, notes, results, and demographic information.    If you feel you have received this communication in error or would no longer like to receive these types of communications, please e-mail [email or web address]

## 2023-11-20 ENCOUNTER — TELEPHONE (OUTPATIENT)
Dept: TRANSPLANT | Facility: CLINIC | Age: 28
End: 2023-11-20
Payer: MEDICARE

## 2023-11-20 ENCOUNTER — LAB VISIT (OUTPATIENT)
Dept: LAB | Facility: HOSPITAL | Age: 28
End: 2023-11-20
Attending: INTERNAL MEDICINE
Payer: MEDICARE

## 2023-11-20 DIAGNOSIS — Z94.83 STATUS POST PANCREAS TRANSPLANTATION: ICD-10-CM

## 2023-11-20 DIAGNOSIS — Z94.0 KIDNEY REPLACED BY TRANSPLANT: Primary | ICD-10-CM

## 2023-11-20 LAB
AMYLASE SERPL-CCNC: 112 U/L (ref 20–110)
BKV DNA SERPL NAA+PROBE-ACNC: <125 COPIES/ML
BKV DNA SERPL NAA+PROBE-LOG#: <2.1 LOG (10) COPIES/ML
BKV DNA SERPL QL NAA+PROBE: NOT DETECTED
LIPASE SERPL-CCNC: 18 U/L (ref 4–60)

## 2023-11-20 PROCEDURE — 36415 COLL VENOUS BLD VENIPUNCTURE: CPT | Performed by: INTERNAL MEDICINE

## 2023-11-20 PROCEDURE — 82150 ASSAY OF AMYLASE: CPT | Performed by: INTERNAL MEDICINE

## 2023-11-20 PROCEDURE — 83690 ASSAY OF LIPASE: CPT | Performed by: INTERNAL MEDICINE

## 2023-11-20 NOTE — TELEPHONE ENCOUNTER
Patient states she has gained around 20 pounds since beginning of year. Pancreas ultrasound scheduled for tomorrow morning. Repeat labs including DSA scheduled for next Monday 11/27.    ----- Message from Tammie Broussard DO sent at 11/20/2023  2:47 PM CST -----  Elevated amylase as well as HgbA1C of 6.0 with elevated C peptide  Check US pancreas again.   Repeat labs next week along with DSA  Has the patient gained significant amount of weight?

## 2023-11-22 ENCOUNTER — HOSPITAL ENCOUNTER (OUTPATIENT)
Dept: RADIOLOGY | Facility: HOSPITAL | Age: 28
Discharge: HOME | End: 2023-11-22
Attending: INTERNAL MEDICINE
Payer: MEDICARE

## 2023-11-22 DIAGNOSIS — Z94.83 STATUS POST PANCREAS TRANSPLANTATION: ICD-10-CM

## 2023-11-22 DIAGNOSIS — Z94.0 KIDNEY REPLACED BY TRANSPLANT: ICD-10-CM

## 2023-11-22 PROCEDURE — 93976 US DOPPLER PANCREAS TRANSPLANT POST (XPD): ICD-10-PCS | Mod: 26,,, | Performed by: RADIOLOGY

## 2023-11-22 PROCEDURE — 76705 US DOPPLER PANCREAS TRANSPLANT POST (XPD): ICD-10-PCS | Mod: 26,59,, | Performed by: RADIOLOGY

## 2023-11-22 PROCEDURE — 93976 VASCULAR STUDY: CPT | Mod: 26,,, | Performed by: RADIOLOGY

## 2023-11-22 PROCEDURE — 76705 ECHO EXAM OF ABDOMEN: CPT | Mod: TC

## 2023-11-22 PROCEDURE — 76705 ECHO EXAM OF ABDOMEN: CPT | Mod: 26,59,, | Performed by: RADIOLOGY

## 2023-11-27 ENCOUNTER — LAB VISIT (OUTPATIENT)
Dept: LAB | Facility: HOSPITAL | Age: 28
End: 2023-11-27
Attending: INTERNAL MEDICINE
Payer: MEDICARE

## 2023-11-27 DIAGNOSIS — Z94.0 KIDNEY REPLACED BY TRANSPLANT: ICD-10-CM

## 2023-11-27 DIAGNOSIS — Z94.83 STATUS POST PANCREAS TRANSPLANTATION: ICD-10-CM

## 2023-11-27 DIAGNOSIS — E10.29 TYPE 1 DIABETES MELLITUS WITH OTHER DIABETIC KIDNEY COMPLICATION: ICD-10-CM

## 2023-11-27 LAB
ALBUMIN SERPL BCP-MCNC: 4.1 G/DL (ref 3.5–5.2)
AMYLASE SERPL-CCNC: 139 U/L (ref 20–110)
ANION GAP SERPL CALC-SCNC: 8 MMOL/L (ref 8–16)
BUN SERPL-MCNC: 31 MG/DL (ref 6–20)
C PEPTIDE SERPL-MCNC: 4.47 NG/ML (ref 0.78–5.19)
CALCIUM SERPL-MCNC: 10 MG/DL (ref 8.7–10.5)
CHLORIDE SERPL-SCNC: 112 MMOL/L (ref 95–110)
CO2 SERPL-SCNC: 21 MMOL/L (ref 23–29)
CREAT SERPL-MCNC: 1 MG/DL (ref 0.5–1.4)
EST. GFR  (NO RACE VARIABLE): >60 ML/MIN/1.73 M^2
ESTIMATED AVG GLUCOSE: 120 MG/DL (ref 68–131)
GLUCOSE SERPL-MCNC: 80 MG/DL (ref 70–110)
HBA1C MFR BLD: 5.8 % (ref 4–5.6)
LIPASE SERPL-CCNC: 20 U/L (ref 4–60)
PHOSPHATE SERPL-MCNC: 3 MG/DL (ref 2.7–4.5)
POTASSIUM SERPL-SCNC: 4.6 MMOL/L (ref 3.5–5.1)
SODIUM SERPL-SCNC: 141 MMOL/L (ref 136–145)

## 2023-11-27 PROCEDURE — 82150 ASSAY OF AMYLASE: CPT | Performed by: INTERNAL MEDICINE

## 2023-11-27 PROCEDURE — 83036 HEMOGLOBIN GLYCOSYLATED A1C: CPT | Performed by: INTERNAL MEDICINE

## 2023-11-27 PROCEDURE — 86832 HLA CLASS I HIGH DEFIN QUAL: CPT | Mod: PO | Performed by: INTERNAL MEDICINE

## 2023-11-27 PROCEDURE — 36415 COLL VENOUS BLD VENIPUNCTURE: CPT | Performed by: INTERNAL MEDICINE

## 2023-11-27 PROCEDURE — 86977 RBC SERUM PRETX INCUBJ/INHIB: CPT | Mod: 59,PO | Performed by: INTERNAL MEDICINE

## 2023-11-27 PROCEDURE — 80069 RENAL FUNCTION PANEL: CPT | Performed by: INTERNAL MEDICINE

## 2023-11-27 PROCEDURE — 84681 ASSAY OF C-PEPTIDE: CPT | Performed by: INTERNAL MEDICINE

## 2023-11-27 PROCEDURE — 86833 HLA CLASS II HIGH DEFIN QUAL: CPT | Mod: PO | Performed by: INTERNAL MEDICINE

## 2023-11-27 PROCEDURE — 83690 ASSAY OF LIPASE: CPT | Performed by: INTERNAL MEDICINE

## 2023-11-30 ENCOUNTER — PATIENT MESSAGE (OUTPATIENT)
Dept: TRANSPLANT | Facility: CLINIC | Age: 28
End: 2023-11-30
Payer: MEDICARE

## 2023-11-30 DIAGNOSIS — Z94.0 KIDNEY REPLACED BY TRANSPLANT: Primary | ICD-10-CM

## 2023-12-14 ENCOUNTER — LAB VISIT (OUTPATIENT)
Dept: LAB | Facility: HOSPITAL | Age: 28
End: 2023-12-14
Attending: INTERNAL MEDICINE
Payer: MEDICARE

## 2023-12-14 DIAGNOSIS — Z94.83 STATUS POST PANCREAS TRANSPLANTATION: ICD-10-CM

## 2023-12-14 DIAGNOSIS — E10.29 TYPE 1 DIABETES MELLITUS WITH OTHER DIABETIC KIDNEY COMPLICATION: ICD-10-CM

## 2023-12-14 LAB
ESTIMATED AVG GLUCOSE: 114 MG/DL (ref 68–131)
HBA1C MFR BLD: 5.6 % (ref 4–5.6)

## 2023-12-14 PROCEDURE — 36415 COLL VENOUS BLD VENIPUNCTURE: CPT | Performed by: INTERNAL MEDICINE

## 2023-12-14 PROCEDURE — 83036 HEMOGLOBIN GLYCOSYLATED A1C: CPT | Performed by: INTERNAL MEDICINE

## 2023-12-19 NOTE — ASSESSMENT & PLAN NOTE
- ESRD on HD  LUE AV fistula    - hemodialysis started on 5//2021.   advanced kidney disease secondary to diabetic nephropathy and HTN.   - patient is on kidney/pancreas transplant list    
- Hgb 11.1, at baseline  - monitor  
- [facility] WOODN  
- blood pressure control  - emergent HD    
- continue statin  
- nephrology consulted, see above    
- questionable pulmonary arterial filling defect. Motion artifact/volume averaging favored.    - follow up lower extremity ultrasound   -  Multifocal patchy ground-glass attenuation seen on previous CT scans.  Patient was told that this was related to her pulmonary edema.  She follows up with pulm and was told that she did not have interstitial lung disease.     
- sinus tachycardia with rates 108-121, hemoptysis, on depo-provera  - EKG with sinus tachycardia  - D-dimer positive, stat CTA pending   - discussed with nephrology, cleared for contrast  - TSH  - afebrile without leukocytosis, CXR with flash pulmonary edema, unable to rule out underlying infection, however SOB resolved with HD- do not suspect tachycardia a sign of sepsis  - tele  
- trend phos levels  - continue home calcitriol  
- volume removal via HD  Results for orders placed during the hospital encounter of 08/05/22    Echo    Interpretation Summary  · The left ventricle is normal in size with concentric remodeling and normal systolic function. The estimated ejection fraction is 55%.  · Normal right ventricular size with normal right ventricular systolic function.  · Indeterminate left ventricular diastolic function.  · Mild left atrial enlargement.  · Normal central venous pressure (3 mmHg).      
-Transfuse for Hg <7.0  
-Trend phos levels. Will evaluate need for phos binders   -Resume home dose of calcitriol once patient is able to take po meds   -Low phos diet   
Endocrinology consulted for BG management.   BG goal 140-180    Recommend 20% reduction home regimen   - Levemir (Insulin Detemir) 8 units in the morning before breakfast and 10 units h.s.   - Novolog (Insulin Aspart) 8 units TIDWM and prn for BG excursions LDC SSI (150/50)  - BG checks AC/HS  - Hypoglycemia protocol in place  - Re-checking A1C to evaluate chronic glycemic control       ** Please notify Endocrine for any change and/or advance in diet**  ** Please call Endocrine for any BG related issues **    Discharge Planning:   - Continue current regimen managed by Dr. Mayo      
Flash pulmonary edema  ESRD on HD  Patient with Hypoxic Respiratory failure which is Acute.  she is not on home oxygen. Supplemental oxygen was provided and noted- Oxygen Concentration (%):  [50] 50.   Signs/symptoms of respiratory failure include- tachypnea and wheezing. Contributing diagnoses includes - flash pulmonary edema Labs and images were reviewed. Patient Has not had a recent ABG. Will treat underlying causes and adjust management of respiratory failure as follows-    - in setting of incomplete HD session on 9/13 (on HD TThSat)  - placed on BIPAP on admit, currently on 1L, wean as tolerated  - BNP 1768, CXR with pulmonary edema  - Nephrology consulted, underwent HD with 3.5L removal with resolution of SOB  - renal diet, 1.5 L fluid restriction  
HTN CKD/ Renovascular HTN  Patient has a current diagnosis of hypertensive urgency (without evidence of end organ damage) which is controlled.  Latest blood pressure and vitals reviewed-   Temp:  [98.3 °F (36.8 °C)]   Pulse:  [108-121]   Resp:  [16-33]   BP: (163-256)/()   SpO2:  [88 %-100 %] .   Patient currently off IV antihypertensives.   Home meds for hypertension were reviewed and noted below.   Hypertension Medications             carvediloL (COREG) 25 MG tablet Take 25 mg by mouth 2 (two) times daily with meals.    hydrALAZINE (APRESOLINE) 25 MG tablet Take 2 tablets (50 mg total) by mouth every 8 (eight) hours.    NIFEdipine (PROCARDIA-XL) 60 MG (OSM) 24 hr tablet Take 60 mg by mouth 2 (two) times a day.        - patient with labile BP, taken off hydralazine ~ 1 month ago for hypotension, restarted at 50 mg BID X 1 week without significant improvement  - s/p nitro gtt, IV hydralazine X2 in ED  - /67--> will avoid further reduction at this time   - Will aim for controlled BP reduction by medications noted above. Monitor and mitigate end organ damage as indicated.  - cardiac diet   
Managed per primary team  Avoid hypoglycemia    Patient received dialysis on 9/14 and 9/15, will resume T.Th, Sat at D/C.     
On statin therapy per ADA guidelines.     
Outpatient HD Information:    -Outpatient HD unit: [facility]   -HD tx days: T/Th/Sat  -HD tx time: 3.5hrs   -Last HD tx prior to presentation: 9/13/22  -HD access: LUE AVF   -HD modality: iHD    -Residual urine: Minimal UOP at baseline       Plan/Recommendations:     -HD today for metabolic clearance and volume management   -Patient seen and examined while on HD, tolerating treatment well, vitals stable, labs reviewed and baths adjusted, UFG of 3.5L as tolerated    -Renal diet, if not NPO   -Strict I/O's and daily weights  -Daily renal function panels   -Renally dose meds     
Patient's FSGs are controlled on current medication regimen.  Last A1c reviewed-   Lab Results   Component Value Date    HGBA1C 8.3 (H) 05/08/2022     Most recent fingerstick glucose reviewed-   Recent Labs   Lab 09/14/22  0938   POCTGLUCOSE 294*     Current correctional scale  Low  Maintain anti-hyperglycemic dose as follows-   Antihyperglycemics (From admission, onward)    Start     Stop Route Frequency Ordered    09/14/22 1645  insulin aspart U-100 pen 10 Units         -- SubQ 3 times daily with meals 09/14/22 1449    09/14/22 1600  insulin detemir U-100 pen 11 Units         -- SubQ 2 times daily 09/14/22 1449    09/14/22 1437  insulin aspart U-100 pen 0-5 Units         -- SubQ Before meals & nightly PRN 09/14/22 1340        Hold Oral hypoglycemics while patient is in the hospital.  - diabetic diet  
Patient's FSGs are controlled on current medication regimen.  Last A1c reviewed-   Lab Results   Component Value Date    HGBA1C 8.3 (H) 05/08/2022     Most recent fingerstick glucose reviewed-   Recent Labs   Lab 09/14/22  0938   POCTGLUCOSE 294*     Current correctional scale  Low  Maintain anti-hyperglycemic dose as follows-   Antihyperglycemics (From admission, onward)    Start     Stop Route Frequency Ordered    09/14/22 1645  insulin aspart U-100 pen 10 Units         -- SubQ 3 times daily with meals 09/14/22 1449    09/14/22 1600  insulin detemir U-100 pen 11 Units         -- SubQ 2 times daily 09/14/22 1449    09/14/22 1437  insulin aspart U-100 pen 0-5 Units         -- SubQ Before meals & nightly PRN 09/14/22 1340        Hold Oral hypoglycemics while patient is in the hospital.  - diabetic diet  
The patient has had issues with labile blood pressure and low blood pressure during HD and afterwards.  However, at other times, her BP will be significantly elevated.   - currently on carvedilol, hydralazine, nifedipine  - titrate blood pressure medications to keep SBP<170    
None

## 2024-01-05 DIAGNOSIS — Z30.9 ENCOUNTER FOR CONTRACEPTIVE MANAGEMENT, UNSPECIFIED TYPE: ICD-10-CM

## 2024-01-05 RX ORDER — MEDROXYPROGESTERONE ACETATE 150 MG/ML
150 INJECTION, SUSPENSION INTRAMUSCULAR
Qty: 1 ML | Refills: 3 | Status: SHIPPED | OUTPATIENT
Start: 2024-01-05

## 2024-01-08 ENCOUNTER — LAB VISIT (OUTPATIENT)
Dept: LAB | Facility: HOSPITAL | Age: 29
End: 2024-01-08
Attending: INTERNAL MEDICINE
Payer: MEDICARE

## 2024-01-08 DIAGNOSIS — Z94.83 STATUS POST PANCREAS TRANSPLANTATION: ICD-10-CM

## 2024-01-08 DIAGNOSIS — Z94.0 KIDNEY REPLACED BY TRANSPLANT: ICD-10-CM

## 2024-01-08 LAB
ALBUMIN SERPL BCP-MCNC: 4 G/DL (ref 3.5–5.2)
AMYLASE SERPL-CCNC: 120 U/L (ref 20–110)
ANION GAP SERPL CALC-SCNC: 6 MMOL/L (ref 8–16)
BASOPHILS # BLD AUTO: 0.02 K/UL (ref 0–0.2)
BASOPHILS NFR BLD: 0.4 % (ref 0–1.9)
BUN SERPL-MCNC: 28 MG/DL (ref 6–20)
CALCIUM SERPL-MCNC: 9.7 MG/DL (ref 8.7–10.5)
CHLORIDE SERPL-SCNC: 111 MMOL/L (ref 95–110)
CO2 SERPL-SCNC: 21 MMOL/L (ref 23–29)
CREAT SERPL-MCNC: 1 MG/DL (ref 0.5–1.4)
DIFFERENTIAL METHOD BLD: ABNORMAL
EOSINOPHIL # BLD AUTO: 0.1 K/UL (ref 0–0.5)
EOSINOPHIL NFR BLD: 1.9 % (ref 0–8)
ERYTHROCYTE [DISTWIDTH] IN BLOOD BY AUTOMATED COUNT: 14.3 % (ref 11.5–14.5)
EST. GFR  (NO RACE VARIABLE): >60 ML/MIN/1.73 M^2
GLUCOSE SERPL-MCNC: 73 MG/DL (ref 70–110)
HCT VFR BLD AUTO: 37.6 % (ref 37–48.5)
HGB BLD-MCNC: 12 G/DL (ref 12–16)
IMM GRANULOCYTES # BLD AUTO: 0.02 K/UL (ref 0–0.04)
IMM GRANULOCYTES NFR BLD AUTO: 0.4 % (ref 0–0.5)
LIPASE SERPL-CCNC: 26 U/L (ref 4–60)
LYMPHOCYTES # BLD AUTO: 1.4 K/UL (ref 1–4.8)
LYMPHOCYTES NFR BLD: 26.9 % (ref 18–48)
MAGNESIUM SERPL-MCNC: 1.6 MG/DL (ref 1.6–2.6)
MCH RBC QN AUTO: 29.3 PG (ref 27–31)
MCHC RBC AUTO-ENTMCNC: 31.9 G/DL (ref 32–36)
MCV RBC AUTO: 92 FL (ref 82–98)
MONOCYTES # BLD AUTO: 0.5 K/UL (ref 0.3–1)
MONOCYTES NFR BLD: 9.9 % (ref 4–15)
NEUTROPHILS # BLD AUTO: 3.2 K/UL (ref 1.8–7.7)
NEUTROPHILS NFR BLD: 60.5 % (ref 38–73)
NRBC BLD-RTO: 0 /100 WBC
PHOSPHATE SERPL-MCNC: 3.3 MG/DL (ref 2.7–4.5)
PLATELET # BLD AUTO: 277 K/UL (ref 150–450)
PMV BLD AUTO: 11 FL (ref 9.2–12.9)
POTASSIUM SERPL-SCNC: 4.5 MMOL/L (ref 3.5–5.1)
RBC # BLD AUTO: 4.1 M/UL (ref 4–5.4)
SODIUM SERPL-SCNC: 138 MMOL/L (ref 136–145)
TACROLIMUS BLD-MCNC: 8.5 NG/ML (ref 5–15)
WBC # BLD AUTO: 5.25 K/UL (ref 3.9–12.7)

## 2024-01-08 PROCEDURE — 83690 ASSAY OF LIPASE: CPT | Performed by: INTERNAL MEDICINE

## 2024-01-08 PROCEDURE — 85025 COMPLETE CBC W/AUTO DIFF WBC: CPT | Performed by: INTERNAL MEDICINE

## 2024-01-08 PROCEDURE — 80069 RENAL FUNCTION PANEL: CPT | Performed by: INTERNAL MEDICINE

## 2024-01-08 PROCEDURE — 80197 ASSAY OF TACROLIMUS: CPT | Performed by: INTERNAL MEDICINE

## 2024-01-08 PROCEDURE — 83735 ASSAY OF MAGNESIUM: CPT | Performed by: INTERNAL MEDICINE

## 2024-01-08 PROCEDURE — 36415 COLL VENOUS BLD VENIPUNCTURE: CPT | Performed by: INTERNAL MEDICINE

## 2024-01-08 PROCEDURE — 82150 ASSAY OF AMYLASE: CPT | Performed by: INTERNAL MEDICINE

## 2024-01-11 ENCOUNTER — TELEPHONE (OUTPATIENT)
Dept: OPHTHALMOLOGY | Facility: CLINIC | Age: 29
End: 2024-01-11
Payer: MEDICARE

## 2024-01-11 NOTE — TELEPHONE ENCOUNTER
----- Message from Syeda Huerta sent at 1/10/2024 11:09 AM CST -----  Regarding: FW: Consult/Advisory  Contact: Isabela Read    ----- Message -----  From: Hemal Conteh  Sent: 1/10/2024  10:53 AM CST  To: [facility] Staff  Subject: Consult/Advisory                                 Consult/Advisory    Name Of Caller: Isabela Read       Contact Preference: [phone] (Mobile      Nature of call: Patient calling to postpone sx and post op appts to March. Requesting a call back.       Returned patient call to reschedule with . no ans lvm

## 2024-01-18 ENCOUNTER — TELEPHONE (OUTPATIENT)
Dept: OPHTHALMOLOGY | Facility: CLINIC | Age: 29
End: 2024-01-18
Payer: MEDICARE

## 2024-02-01 NOTE — TELEPHONE ENCOUNTER
----- Message from Patrica Richardson sent at 3/11/2022  9:42 AM CST -----  Patient is calling to reschedule post op appt to next week. The schedule is not populating on my end due to her insurance.   Please call back at [phone]     Access Code: [email or web address]  URL: [email or web address]  Date: 02/01/2024  Prepared by: Nely Joy    Exercises  - All fours- knee hover  - 1 x daily - 7 x weekly - 2 sets - 10 reps - 5 second hold  - Bird Dog  - 1 x daily - 7 x weekly - 2 sets - 5 reps - 3-5 seconds hold  - Wall slide  - 1 x daily - 7 x weekly - 2 sets - 10 reps - 5 sec hold  - Single Leg 3 Way Reach with Support  - 1 x daily - 7 x weekly - 2 sets - 10 reps  - Walking Gaze Stabilization Head Rotation  - 1 x daily - 7 x weekly - 60 seconds hold  - Wide Tandem Stance with Eyes Closed  - 1 x daily - 7 x weekly - 2 sets - 30 second hold  - Forward walking horizontal and vertical VOR cancellation  - 1 x daily - 7 x weekly - 2 sets - 30 hold

## 2024-02-04 ENCOUNTER — HOSPITAL ENCOUNTER (EMERGENCY)
Facility: HOSPITAL | Age: 29
Discharge: HOME | End: 2024-02-05
Attending: EMERGENCY MEDICINE
Payer: MEDICARE

## 2024-02-04 DIAGNOSIS — R07.9 CHEST PAIN: ICD-10-CM

## 2024-02-04 DIAGNOSIS — M79.89 FOOT SWELLING: ICD-10-CM

## 2024-02-04 DIAGNOSIS — S93.324A DISLOCATION OF TARSOMETATARSAL JOINT OF RIGHT FOOT, INITIAL ENCOUNTER: Primary | ICD-10-CM

## 2024-02-04 DIAGNOSIS — M25.473 ANKLE SWELLING: ICD-10-CM

## 2024-02-04 DIAGNOSIS — R00.0 TACHYCARDIA: ICD-10-CM

## 2024-02-04 LAB
ALBUMIN SERPL BCP-MCNC: 4.2 G/DL (ref 3.5–5.2)
ALP SERPL-CCNC: 90 U/L (ref 55–135)
ALT SERPL W/O P-5'-P-CCNC: 14 U/L (ref 10–44)
ANION GAP SERPL CALC-SCNC: 11 MMOL/L (ref 8–16)
ANISOCYTOSIS BLD QL SMEAR: SLIGHT
AST SERPL-CCNC: 16 U/L (ref 10–40)
BASOPHILS # BLD AUTO: 0.03 K/UL (ref 0–0.2)
BASOPHILS NFR BLD: 0.3 % (ref 0–1.9)
BILIRUB SERPL-MCNC: 0.7 MG/DL (ref 0.1–1)
BNP SERPL-MCNC: 11 PG/ML (ref 0–99)
BUN SERPL-MCNC: 21 MG/DL (ref 6–20)
BURR CELLS BLD QL SMEAR: ABNORMAL
CALCIUM SERPL-MCNC: 10.7 MG/DL (ref 8.7–10.5)
CHLORIDE SERPL-SCNC: 111 MMOL/L (ref 95–110)
CO2 SERPL-SCNC: 15 MMOL/L (ref 23–29)
CREAT SERPL-MCNC: 1.2 MG/DL (ref 0.5–1.4)
CRP SERPL-MCNC: 128.8 MG/L (ref 0–8.2)
DACRYOCYTES BLD QL SMEAR: ABNORMAL
DIFFERENTIAL METHOD BLD: ABNORMAL
EOSINOPHIL # BLD AUTO: 0.1 K/UL (ref 0–0.5)
EOSINOPHIL NFR BLD: 0.6 % (ref 0–8)
ERYTHROCYTE [DISTWIDTH] IN BLOOD BY AUTOMATED COUNT: 14.2 % (ref 11.5–14.5)
ERYTHROCYTE [SEDIMENTATION RATE] IN BLOOD BY PHOTOMETRIC METHOD: 60 MM/HR (ref 0–36)
EST. GFR  (NO RACE VARIABLE): >60 ML/MIN/1.73 M^2
GIANT PLATELETS BLD QL SMEAR: PRESENT
GLUCOSE SERPL-MCNC: 127 MG/DL (ref 70–110)
HCT VFR BLD AUTO: 39.9 % (ref 37–48.5)
HGB BLD-MCNC: 12.1 G/DL (ref 12–16)
HYPOCHROMIA BLD QL SMEAR: ABNORMAL
IMM GRANULOCYTES # BLD AUTO: 0.04 K/UL (ref 0–0.04)
IMM GRANULOCYTES NFR BLD AUTO: 0.4 % (ref 0–0.5)
LYMPHOCYTES # BLD AUTO: 0.7 K/UL (ref 1–4.8)
LYMPHOCYTES NFR BLD: 6.3 % (ref 18–48)
MCH RBC QN AUTO: 29.1 PG (ref 27–31)
MCHC RBC AUTO-ENTMCNC: 30.3 G/DL (ref 32–36)
MCV RBC AUTO: 96 FL (ref 82–98)
MONOCYTES # BLD AUTO: 0.8 K/UL (ref 0.3–1)
MONOCYTES NFR BLD: 6.9 % (ref 4–15)
NEUTROPHILS # BLD AUTO: 9.5 K/UL (ref 1.8–7.7)
NEUTROPHILS NFR BLD: 85.5 % (ref 38–73)
NRBC BLD-RTO: 0 /100 WBC
PLATELET # BLD AUTO: 338 K/UL (ref 150–450)
PLATELET BLD QL SMEAR: ABNORMAL
PMV BLD AUTO: 11 FL (ref 9.2–12.9)
POIKILOCYTOSIS BLD QL SMEAR: SLIGHT
POLYCHROMASIA BLD QL SMEAR: ABNORMAL
POTASSIUM SERPL-SCNC: 4.3 MMOL/L (ref 3.5–5.1)
PROT SERPL-MCNC: 8.9 G/DL (ref 6–8.4)
RBC # BLD AUTO: 4.16 M/UL (ref 4–5.4)
SODIUM SERPL-SCNC: 137 MMOL/L (ref 136–145)
TROPONIN I SERPL DL<=0.01 NG/ML-MCNC: 0.01 NG/ML (ref 0–0.03)
URATE SERPL-MCNC: 6.3 MG/DL (ref 2.4–5.7)
WBC # BLD AUTO: 11.12 K/UL (ref 3.9–12.7)
WBC TOXIC VACUOLES BLD QL SMEAR: PRESENT

## 2024-02-04 PROCEDURE — 85025 COMPLETE CBC W/AUTO DIFF WBC: CPT | Performed by: EMERGENCY MEDICINE

## 2024-02-04 PROCEDURE — 93010 ELECTROCARDIOGRAM REPORT: CPT | Mod: ,,, | Performed by: INTERNAL MEDICINE

## 2024-02-04 PROCEDURE — 80197 ASSAY OF TACROLIMUS: CPT | Performed by: EMERGENCY MEDICINE

## 2024-02-04 PROCEDURE — 80053 COMPREHEN METABOLIC PANEL: CPT | Performed by: EMERGENCY MEDICINE

## 2024-02-04 PROCEDURE — 99285 EMERGENCY DEPT VISIT HI MDM: CPT | Mod: 25

## 2024-02-04 PROCEDURE — 84484 ASSAY OF TROPONIN QUANT: CPT | Performed by: EMERGENCY MEDICINE

## 2024-02-04 PROCEDURE — 86140 C-REACTIVE PROTEIN: CPT

## 2024-02-04 PROCEDURE — 25000003 PHARM REV CODE 250

## 2024-02-04 PROCEDURE — 84550 ASSAY OF BLOOD/URIC ACID: CPT

## 2024-02-04 PROCEDURE — 83880 ASSAY OF NATRIURETIC PEPTIDE: CPT | Performed by: EMERGENCY MEDICINE

## 2024-02-04 PROCEDURE — 93005 ELECTROCARDIOGRAM TRACING: CPT

## 2024-02-04 PROCEDURE — 85652 RBC SED RATE AUTOMATED: CPT

## 2024-02-04 RX ORDER — ACETAMINOPHEN 500 MG
1000 TABLET ORAL
Status: COMPLETED | OUTPATIENT
Start: 2024-02-04 | End: 2024-02-04

## 2024-02-04 RX ADMIN — ACETAMINOPHEN 1000 MG: 500 TABLET ORAL at 03:02

## 2024-02-04 NOTE — ED PROVIDER NOTES
Encounter Date: 2/4/2024     Source of History:   Patient and medical record, without language barrier or      Chief complaint:  Leg Pain (Pain and swelling to r lower leg started on Friday, walked in parade on sat, denies injury, leg warm to touch, feeling heart beat in chest, kidney xplant nov 2022)    HPI:  Isabela Read is a 28 y.o. female with history of ESRD s/p Kidney Tx on 11/22 presenting with chief complaint of right ankle pain.  Patient states 2 days ago she 1st developed mild right foot and ankle swelling.  She denies any trauma or open abrasions or wounds to this foot.  She states on Friday she went to per aides and afterwards as when she 1st noticed the swelling which was mild at that time.  Yesterday however patient walked in.  And afterwards she noticed that the swelling was more pronounced.  It is associated with mild discomfort but not overt pain.  She is still able to ambulate.  She has no symptoms or swelling on the contralateral foot.  She states she has had lower extremity swelling in the past but was bilateral and pretransplant    This is the extent to the patients complaints today here in the emergency department.    ROS: As per HPI and below:  ROS    Review of patient's allergies indicates:  No Known Allergies  PMH:  As per HPI and below:  Past Medical History:   Diagnosis Date    Acute cystitis without hematuria 11/25/2022    Acute kidney injury superimposed on chronic kidney disease 4/7/2021    Anemia     Anemia in ESRD (end-stage renal disease) 7/29/2020    Lab Results  Component Value Date   WBC 7.23 07/29/2020   HGB 7.1 (L) 07/29/2020   HCT 22.2 (L) 07/29/2020   MCV 91 07/29/2020    (H) 07/29/2020   Following with hematology and scheduled to undergo iron infusions    Bacteremia due to Escherichia coli 1/23/2023    Bilious vomiting with nausea 12/12/2022    Chronic hypertension with exacerbation during pregnancy in second trimester 11/6/2020    Current regimen  (11/6/20):  - Carvedilol 12.5 mg BID - Nifedipine 30 mg daily Baseline CKD + proteinuria    Chronic kidney disease     Depression     Diabetes mellitus     Diabetic retinopathy     Diarrhea     Encounter for blood transfusion     End stage renal failure on dialysis     Fever 12/1/2022    Fever of undetermined origin 12/12/2022    Gastroparesis     Glaucoma     Hepatomegaly 4/29/2021    History of diabetes mellitus, type I 12/20/2022    Hx of psychiatric care     Hyperlipidemia     Hypertension     Nausea and vomiting 12/12/2022    Nephrotic syndrome     Nephrotic syndrome 3/4/2021    Nonspecific reaction to tuberculin skin test without active tuberculosis 10/1/2021    Orthostatic hypotension 1/25/2023    Palpitations     Poor fetal growth affecting management of mother in second trimester 10/15/2020    Pyelonephritis, acute 11/26/2022    Restrictive lung disease     Retinal detachment     Sepsis 12/1/2022    Sepsis 11/25/2022    Sepsis due to Escherichia coli 1/23/2023    Severe pre-eclampsia in second trimester 11/6/2020    Severe sepsis 11/25/2022    SIRS (systemic inflammatory response syndrome) 12/6/2022    Status post pancreas transplantation 11/26/2022 11/2/2022    Type 1 diabetes mellitus with end-stage renal disease (ESRD) 2/24/2015    Followed by Dr. Mayo.  Last A1C was 13  Currently on lantus 20 units qAM and humolog 10 units with meals  - a fetal echocardiogram around 22-24 weeks - needs eye exam, podiatry exam  - needs EKG, maternal echo and fu with cardiology  - ASA 81mg, folic acid 4mg PO daily - hemoglobin A1c every 4-6 weeks -24 hour urine for protein and creatinine clearance should be performed. Patient will also need a     Past Surgical History:   Procedure Laterality Date    AV FISTULA PLACEMENT Left 04/07/2021    Procedure: CREATION, AV FISTULA;  Surgeon: Roberto Ryan MD;  Location: [facility] OR [address] [place] [address];  Service: Peripheral Vascular;  Laterality: Left;    COLONOSCOPY N/A 03/16/2022     Procedure: COLONOSCOPY;  Surgeon: Tavo Kwok MD;  Location: [facility] ENDO (4TH FLR);  Service: Endoscopy;  Laterality: N/A;  Questionable history of delayed gastric emptying longstanding diabetes now on eating pre transplant workup for history of nausea vomiting which seems to have improved with dialysis also chronic diarrhea and history of anemia pre transplant workup for kidney transplant. 3    CYSTOSCOPY      ESOPHAGOGASTRODUODENOSCOPY N/A 03/16/2022    Procedure: EGD (ESOPHAGOGASTRODUODENOSCOPY);  Surgeon: Tavo Kwok MD;  Location: [facility] ENDO (4TH FLR);  Service: Endoscopy;  Laterality: N/A;  Questionable history of delayed gastric emptying longstanding diabetes now on eating pre transplant workup for history of nausea vomiting which seems to have improved with dialysis also chronic diarrhea and history of anemia pre transplant workup for    FISTULOGRAM N/A 08/11/2021    Procedure: Fistulogram;  Surgeon: Roberto Ryan MD;  Location: [facility] CATH LAB;  Service: Cardiology;  Laterality: N/A;    KIDNEY TRANSPLANT Right 11/02/2022    Procedure: TRANSPLANT, KIDNEY;  Surgeon: Kumar Rodriges Jr., MD;  Location: [facility] OR 2ND FLR;  Service: Transplant;  Laterality: Right;    LASER PHOTOCOAGULATION OF RETINA Right 05/31/2022    Procedure: PHOTOCOAGULATION, RETINA, USING LASER;  Surgeon: Maximilian Montaño MD;  Location: [facility] OR 1ST FLR;  Service: Ophthalmology;  Laterality: Right;    PERCUTANEOUS TRANSLUMINAL ANGIOPLASTY OF ARTERIOVENOUS FISTULA N/A 08/11/2021    Procedure: PTA, AV FISTULA;  Surgeon: Roberto Ryan MD;  Location: [facility] CATH LAB;  Service: Cardiology;  Laterality: N/A;    REMOVAL IMPLANT, POSTERIOR SEGMENT, INTRAOCULAR Right 02/01/2022    Procedure: REMOVAL IMPLANT, POSTERIOR SEGMENT, INTRAOCULAR;  Surgeon: Maximilian Montaño MD;  Location: [facility] OR 1ST FLR;  Service: Ophthalmology;  Laterality: Right;    REPAIR OF RETINAL DETACHMENT WITH VITRECTOMY Right 01/25/2022    Procedure:  "REPAIR, RETINAL DETACHMENT, WITH VITRECTOMY, MEMBRANE PEEL, LASER, INJECTION OF GAS VS OIL;  Surgeon: Maximilian Montaño MD;  [facility];  Service: Ophthalmology;  Laterality: Right;    REPAIR, RETINAL DETACHMENT, COMPLEX, WITH VITRECTOMY AND MEMBRANE PEELING Right 3/21/2023    Procedure: REPAIR,RETINAL DETACHMENT,COMPLEX,WITH VITRECTOMY AND MEMBRANE PEELING;  Surgeon: Maximilian Montaño MD;  [facility];  Service: Ophthalmology;  Laterality: Right;  25ga    REVISION OF ARTERIOVENOUS FISTULA Left 12/10/2021    Procedure: REVISION, AV FISTULA with BVT;  Surgeon: Roberto Ryan MD;  [facility];  Service: Peripheral Vascular;  Laterality: Left;    TRANSPLANTATION OF PANCREAS N/A 11/02/2022    Procedure: TRANSPLANT, PANCREAS;  Surgeon: Kumar Rodriges Jr., MD;  [facility];  Service: Transplant;  Laterality: N/A;    VITRECTOMY BY PARS PLANA APPROACH Right 02/01/2022    Procedure: VITRECTOMY, PARS PLANA APPROACH;  Surgeon: Maximilian Montaño MD;  [facility];  Service: Ophthalmology;  Laterality: Right;    VITRECTOMY BY PARS PLANA APPROACH Right 05/31/2022    Procedure: VITRECTOMY, PARS PLANA APPROACH;  Surgeon: Maximilian Montaño MD;  Location: [facility] OR [address] [place] [address];  Service: Ophthalmology;  Laterality: Right;     Social History     Tobacco Use    Smoking status: Never    Smokeless tobacco: Never   Substance Use Topics    Alcohol use: No    Drug use: No     Vitals:    /68   Pulse 109   Temp 98.5 °F (36.9 °C)   Resp 18   Ht 5' 4" (1.626 m)   Wt 74.8 kg (165 lb)   SpO2 100%   BMI 28.32 kg/m²     Physical Exam  Vitals and nursing note reviewed.   Constitutional:       General: She is not in acute distress.     Appearance: She is not toxic-appearing.   HENT:      Head: Normocephalic and atraumatic.      Mouth/Throat:      Mouth: Mucous membranes are moist.   Eyes:      General: No scleral icterus.  Cardiovascular:      Rate and " Rhythm: Normal rate and regular rhythm.      Pulses: Normal pulses.      Heart sounds: Normal heart sounds. No murmur heard.     No friction rub. No gallop.   Pulmonary:      Effort: Pulmonary effort is normal. No respiratory distress.      Breath sounds: Normal breath sounds. No stridor. No wheezing, rhonchi or rales.   Abdominal:      General: Abdomen is flat. There is no distension.      Palpations: Abdomen is soft.      Tenderness: There is no abdominal tenderness. There is no guarding.   Musculoskeletal:         General: No swelling or deformity.      Cervical back: Normal range of motion and neck supple.      Comments:   RLE:  Moderate circumferential swelling of right foot and ankle  No bony TTP throughout  Compartments soft  Painless ROM ankle   SILT   Motor intact   2+ DP and PT   Skin:     General: Skin is warm and dry.      Capillary Refill: Capillary refill takes less than 2 seconds.      Coloration: Skin is not jaundiced.      Findings: No rash.   Neurological:      Mental Status: She is alert. Mental status is at baseline.       Procedures    Laboratory Studies:  Labs that have been ordered have been independently reviewed and interpreted by myself.  Labs Reviewed   CBC W/ AUTO DIFFERENTIAL - Abnormal; Notable for the following components:       Result Value    MCHC 30.3 (*)     Gran # (ANC) 9.5 (*)     Lymph # 0.7 (*)     Gran % 85.5 (*)     Lymph % 6.3 (*)     All other components within normal limits   COMPREHENSIVE METABOLIC PANEL - Abnormal; Notable for the following components:    Chloride 111 (*)     CO2 15 (*)     Glucose 127 (*)     BUN 21 (*)     Calcium 10.7 (*)     Total Protein 8.9 (*)     All other components within normal limits   SEDIMENTATION RATE - Abnormal; Notable for the following components:    Sed Rate 60 (*)     All other components within normal limits   C-REACTIVE PROTEIN - Abnormal; Notable for the following components:    .8 (*)     All other components within normal  limits   URIC ACID - Abnormal; Notable for the following components:    Uric Acid 6.3 (*)     All other components within normal limits   B-TYPE NATRIURETIC PEPTIDE   TROPONIN I   URINALYSIS, REFLEX TO URINE CULTURE   TACROLIMUS LEVEL     Imaging Results              CT Foot Without Contrast Right (Final result)  Result time 02/04/24 21:50:37      Final result by Randy Sullivan MD (02/04/24 21:50:37)                   Impression:      Redemonstration of Lisfranc fracture dislocation, fractures of the 1st through 5th metatarsal bases, and fracture of the 2nd toe proximal phalanx.  Additional findings discussed in the body of the report.    Electronically signed by resident: Demetria Bernal  Date:    02/04/2024  Time:    21:17    Electronically signed by: Randy Sullivan MD  Date:    02/04/2024  Time:    21:50               Narrative:    EXAMINATION:  CT FOOT WITHOUT CONTRAST RIGHT; CT 3D WITHOUT INDEPENDENT WS    CLINICAL HISTORY:  Fracture, foot;lisfranc on XR;; Fracture, foot;lisfranc on XR;fracture;    TECHNIQUE:  Axial 0.625-mm images of the right foot obtained without intravenous contrast.  Data submitted for coronal and sagittal reformats.    3D reconstructed images were acquired by post processing on an independent workstation with concurrent supervision and archived for permanent record. 3D imaging of the right foot was obtained for further evaluation and operative planning if needed.    COMPARISON:  Foot radiographs 02/04/2024    FINDINGS:  Acute minimally displaced comminuted fractures through the bases of the 1st through 5th metatarsals.  Many of these fractures are comminuted with fracture lines extending into the tarsometatarsal joints.  There is widening of the Lisfranc joint.  There is a nondisplaced fracture through the base of the proximal phalanx of the 2nd toe.  Suspect subtle avulsion fracture involving the superior aspect of the medial cuneiform.  Evaluation of the other tarsal bones  somewhat limited by motion.  Additional subtle tarsal fractures would be difficult to exclude, including a questionable avulsion fracture of the cuboid.  There is subcutaneous edema about the forefoot.  Age advanced vascular calcifications noted.                                       CT 3D RECON WITHOUT INDEPENDENT WS (Final result)  Result time 02/04/24 21:50:37      Final result by Randy Sullivan MD (02/04/24 21:50:37)                   Impression:      Redemonstration of Lisfranc fracture dislocation, fractures of the 1st through 5th metatarsal bases, and fracture of the 2nd toe proximal phalanx.  Additional findings discussed in the body of the report.    Electronically signed by resident: Demetria Bernal  Date:    02/04/2024  Time:    21:17    Electronically signed by: Randy Sullivan MD  Date:    02/04/2024  Time:    21:50               Narrative:    EXAMINATION:  CT FOOT WITHOUT CONTRAST RIGHT; CT 3D WITHOUT INDEPENDENT WS    CLINICAL HISTORY:  Fracture, foot;lisfranc on XR;; Fracture, foot;lisfranc on XR;fracture;    TECHNIQUE:  Axial 0.625-mm images of the right foot obtained without intravenous contrast.  Data submitted for coronal and sagittal reformats.    3D reconstructed images were acquired by post processing on an independent workstation with concurrent supervision and archived for permanent record. 3D imaging of the right foot was obtained for further evaluation and operative planning if needed.    COMPARISON:  Foot radiographs 02/04/2024    FINDINGS:  Acute minimally displaced comminuted fractures through the bases of the 1st through 5th metatarsals.  Many of these fractures are comminuted with fracture lines extending into the tarsometatarsal joints.  There is widening of the Lisfranc joint.  There is a nondisplaced fracture through the base of the proximal phalanx of the 2nd toe.  Suspect subtle avulsion fracture involving the superior aspect of the medial cuneiform.  Evaluation of the  other tarsal bones somewhat limited by motion.  Additional subtle tarsal fractures would be difficult to exclude, including a questionable avulsion fracture of the cuboid.  There is subcutaneous edema about the forefoot.  Age advanced vascular calcifications noted.                                       X-Ray Foot Complete Right (Final result)  Result time 02/04/24 21:00:08      Final result by Randy Sullivan MD (02/04/24 21:00:08)                   Impression:      Similar findings when compared with examination obtained 1 hour prior, consistent with Lisfranc fracture dislocation.      Electronically signed by: Randy Sullivan MD  Date:    02/04/2024  Time:    21:00               Narrative:    EXAMINATION:  XR FOOT COMPLETE 3 VIEW RIGHT    CLINICAL HISTORY:  Evaluate lis franc joint, fracture;.    TECHNIQUE:  AP, lateral, and oblique views of the right foot were performed.    COMPARISON:  02/04/2024 at 19:35.    FINDINGS:  Multiple fractures at the base of the metatarsals with widening of the Lisfranc joint.  Other findings including questionable fracture of the 2nd toe proximal phalanx and cuboid are similar.  Findings are overall similar to examination obtained 1 hour prior.  Extensive soft tissue edema along the dorsal aspect of the foot.  No unexpected radiopaque foreign body.                                        X-Ray Foot Complete Right (Final result)  Result time 02/04/24 19:59:30      Final result by Chintan Zepeda MD (02/04/24 19:59:30)                   Impression:      Lisfranc fracture dislocation.  Recommend CT foot.    Additional posttraumatic findings, as above    This report was flagged in Epic as abnormal.      Electronically signed by: Chintan Zepeda  Date:    02/04/2024  Time:    19:59               Narrative:    EXAMINATION:  XR FOOT COMPLETE 3 VIEW RIGHT    CLINICAL HISTORY:  . Other specified soft tissue disorders    TECHNIQUE:  AP, lateral, and oblique views of the right  foot were performed.    COMPARISON:  Same day ankle    FINDINGS:  Osteopenia.  Acute minimally displaced fractures of the 1st through 5th metatarsal bases.  Prominent Lisfranc joint, although suboptimally evaluated on these nonweightbearing radiographs.  Acute nondisplaced fracture of the 2nd toe proximal phalangeal base lateral margin.  Deformity of the distal lateral margin cuboid bone may be posttraumatic and/or degenerative.  Forefoot swelling.  Vascular calcifications.                                       X-Ray Ankle Complete Right (Final result)  Result time 02/04/24 18:48:25      Final result by Ron Garcia MD (02/04/24 18:48:25)                   Impression:      No acute findings evident within the right ankle.    Dorsal soft tissue swelling of the foot is with irregularity of the proximal meta tarsals.  Consider radiographs of the foot clinically warranted.      Electronically signed by: Ron Garcia  Date:    02/04/2024  Time:    18:48               Narrative:    EXAMINATION:  XR ANKLE COMPLETE 3 VIEW RIGHT    CLINICAL HISTORY:  Effusion, unspecified ankle    TECHNIQUE:  AP, lateral, and oblique images of the right ankle were performed.    COMPARISON:  None    FINDINGS:  Bones, joint spaces and soft tissues of the ankle appear intact.  Twenty vascular calcifications are present.  There is no evidence of fracture some a dislocation or effusion.    There is soft tissue swelling over the dorsum of the midfoot.  The proximal metatarsals appear irregular inter incompletely evaluated boxes the.  Procedures                                       US Lower Extremity Veins Right (Final result)  Result time 02/04/24 17:08:21      Final result by Josh Webb MD (02/04/24 17:08:21)                   Impression:      No evidence of deep venous thrombosis in the right lower extremity.    Electronically signed by resident: Rodríguez Tam  Date:    02/04/2024  Time:    17:02    Electronically signed by: Josh  MD Jon  Date:    02/04/2024  Time:    17:08               Narrative:    EXAMINATION:  US LOWER EXTREMITY VEINS RIGHT    CLINICAL HISTORY:  Effusion, unspecified ankle    TECHNIQUE:  Duplex and color flow Doppler evaluation and graded compression of the right lower extremity veins was performed.    COMPARISON:  Bilateral lower extremity vein ultrasound 09/14/2022.    FINDINGS:  Right thigh veins: The common femoral, femoral, popliteal, and upper greater saphenous veins are patent and free of thrombus. The veins are normally compressible and have normal phasic flow and augmentation response.    Right calf veins: The visualized calf veins are patent.    Contralateral CFV: The contralateral (left) common femoral vein is patent and free of thrombus.    Miscellaneous: None                                    EKG (independently interpreted by me): Rhythm:  Sinus tachycardia, rate of  117 BPM, no ST elevations or depressions, QTc 429    I decided to obtain the patient's medical records.  Summary of Medical Records:      Medications   acetaminophen tablet 1,000 mg (1,000 mg Oral Given 2/4/24 1554)     MDM:    28 y.o. female with ankle pain and swelling    Differential Dx:  Differential includes but is not limited to DVT, fracture, doubt septic arthritis    ED Management:  Will obtain basic labs and plain films to assess for cause of patient's ankle pain.  Ankle x-ray demonstrating irregularity of the metatarsals.  Dedicated foot imaging ordered.  Patient found to have Lisfranc fracture dislocation.  Orthopedics consulted.  CT foot ordered with and weight-bearing films ordered to assess.  DVT ultrasound negative.  Patient signed out to oncoming team pending orthopedic recommendations    Discussed with:  Orthopedics    Medical Decision Making  Amount and/or Complexity of Data Reviewed  Labs: ordered.  Radiology: ordered.    Risk  OTC drugs.            Diagnostic Impression:    Final diagnoses:  [R00.0] Tachycardia  [R07.9]  Chest pain  [M25.473] Ankle swelling  [M79.89] Foot swelling  [S93.324A] Dislocation of tarsometatarsal joint of right foot, initial encounter (Primary)             Attending Attestation:   Physician Attestation Statement for Resident:  As the supervising MD   Physician Attestation Statement: I have personally seen and examined this patient.   I agree with the above history.  -: 28 year old woman with past medical history of kidney pancreas transplant (11/22) presents with a chief complaint of right ankle pain and swelling.  Began 2 days ago.  Atraumatic.  Yesterday she was able to walk as a chaperone of a parade without difficulty.  Unfortunately swelling has worsened. Distally neurovascularly intact.  Ankle is not rigid.  No pain with passive range of motion.  When I evaluated her, she noted that pain resolved.   She does have appreciable swelling throughout the right ankle and foot without any discrete area of bony tenderness. Ultrasound negative for DVT. Initially I was suspicious for an atypical septic arthritis. I ordered an ESR and CRP which later returned elevated but bedside ultrasound did not reveal any ankle effusion.  Plain films revealed findings suspicious for a Lisfranc fracture.  Orthopedics consulted for evaluation.  I am uncertain etiology of the elevated inflammatory markers.  At time of shift change, patient was signed out to incoming team.  Pending evaluation by Orthopedics and urinalysis.   As the supervising MD I agree with the above PE.   -: ECG on my independent interpretation revealed sinus tachycardia at a rate of 128 bpm. Normal axis. Normal ST segments. Normal T waves.   As the supervising MD I agree with the above treatment, course, plan, and disposition.                     Cayetano Cardona MD  Resident  02/04/24 [id number]       Mendez Fuller MD  02/04/24 [id number]       Mendez Fuller MD  02/05/24 0102

## 2024-02-04 NOTE — ED NOTES
Patient identifiers verified and correct for Isabela Read.  LOC: The patient is awake, alert and aware of environment with an appropriate affect, the patient is oriented x 3 and speaking appropriately.   APPEARANCE: Patient appears comfortable and in no acute distress, patient is clean and well groomed.  SKIN: The skin is warm and dry, color consistent with ethnicity, patient has normal skin turgor and moist mucus membranes, skin intact, no breakdown or bruising noted.   MUSCULOSKELETAL: Patient moving all extremities spontaneously, swelling to right ankle and foot.  RESPIRATORY: Airway is open and patent, respirations are spontaneous, patient has a normal effort and rate, no accessory muscle use noted, pt placed on continuous pulse ox with O2 sats noted at 97% on room air.  CARDIAC: Pt placed on cardiac monitor. Sinus tachycardia on monitor, no edema noted, capillary refill < 3 seconds.   GASTRO: Soft and non tender to palpation, no distention noted, normoactive bowel sounds present in all four quadrants. Pt states bowel movements have been regular.  : Pt denies any pain or frequency with urination.  NEURO: Pt opens eyes spontaneously, behavior appropriate to situation, follows commands, facial expression symmetrical, bilateral hand grasp equal and even, purposeful motor response noted, normal sensation in all extremities when touched with a finger.

## 2024-02-05 VITALS
RESPIRATION RATE: 18 BRPM | OXYGEN SATURATION: 100 % | WEIGHT: 165 LBS | DIASTOLIC BLOOD PRESSURE: 73 MMHG | BODY MASS INDEX: 28.17 KG/M2 | SYSTOLIC BLOOD PRESSURE: 137 MMHG | HEART RATE: 111 BPM | HEIGHT: 64 IN | TEMPERATURE: 99 F

## 2024-02-05 PROBLEM — S93.324A LISFRANC DISLOCATION, RIGHT, INITIAL ENCOUNTER: Status: ACTIVE | Noted: 2024-02-05

## 2024-02-05 LAB — TACROLIMUS BLD-MCNC: 12.3 NG/ML (ref 5–15)

## 2024-02-05 NOTE — HPI
Isabela Read is a 28 y.o. female with PMH significant for nephrotic syndrome, renovascular hypertension, vitamin-D deficiency, resolved type 1 diabetes after pancreas and kidney transplantation, currently on immunosuppressant therapy, peripheral neuropathy, prior ESRD on hemodialysis since resolved presenting with a 3 week history of right sided foot pain.  Patient states that 3 weeks ago while beginning a gym challenge, she was doing sit-ups and had her foot resting underneath a stack of weights.  She reports that the feeling in her feet is not very sensitive, and she dropped the weight is on her right foot.  It was not painful at that time however it began to hurt over the course of 3 weeks, and it began to swell 3 days ago.  She states she has been bearing weight on it, and she even completed her workout course with her current condition.  Patient denies any head trauma or LOC. The patient denies prior hx of falls. Patient denies any new numbness and tingling, endorses chronic neuropathy of the feet. Denies any other musculoskeletal pain or injuries. No known history of prior orthopedic injury or surgery. Walks w/out assisted devices at baseline. Doesn't take any anticoagulation at baseline.   They deny IV drug use.   They deny tobacco use.   They deny alcohol use.   They endorse immunosuppressant medications, prograf, cellcept.  They deny chemotherapy.  They deny radiation therapy.

## 2024-02-05 NOTE — DISCHARGE INSTRUCTIONS
Your evaluation in the ED is significant for a dislocation of your foot. We will send you home with a walking boot and crutches. We will send a referral to orthopedics for further management and evaluaion. Follow up with your primary care doctor as well. Return to the ED for new or worsening symptoms including numbness, weakness, and worsening foot pain

## 2024-02-05 NOTE — SUBJECTIVE & OBJECTIVE
Past Medical History:   Diagnosis Date    Acute cystitis without hematuria 11/25/2022    Acute kidney injury superimposed on chronic kidney disease 4/7/2021    Anemia     Anemia in ESRD (end-stage renal disease) 7/29/2020    Lab Results  Component Value Date   WBC 7.23 07/29/2020   HGB 7.1 (L) 07/29/2020   HCT 22.2 (L) 07/29/2020   MCV 91 07/29/2020    (H) 07/29/2020   Following with hematology and scheduled to undergo iron infusions    Bacteremia due to Escherichia coli 1/23/2023    Bilious vomiting with nausea 12/12/2022    Chronic hypertension with exacerbation during pregnancy in second trimester 11/6/2020    Current regimen (11/6/20):  - Carvedilol 12.5 mg BID - Nifedipine 30 mg daily Baseline CKD + proteinuria    Chronic kidney disease     Depression     Diabetes mellitus     Diabetic retinopathy     Diarrhea     Encounter for blood transfusion     End stage renal failure on dialysis     Fever 12/1/2022    Fever of undetermined origin 12/12/2022    Gastroparesis     Glaucoma     Hepatomegaly 4/29/2021    History of diabetes mellitus, type I 12/20/2022    Hx of psychiatric care     Hyperlipidemia     Hypertension     Nausea and vomiting 12/12/2022    Nephrotic syndrome     Nephrotic syndrome 3/4/2021    Nonspecific reaction to tuberculin skin test without active tuberculosis 10/1/2021    Orthostatic hypotension 1/25/2023    Palpitations     Poor fetal growth affecting management of mother in second trimester 10/15/2020    Pyelonephritis, acute 11/26/2022    Restrictive lung disease     Retinal detachment     Sepsis 12/1/2022    Sepsis 11/25/2022    Sepsis due to Escherichia coli 1/23/2023    Severe pre-eclampsia in second trimester 11/6/2020    Severe sepsis 11/25/2022    SIRS (systemic inflammatory response syndrome) 12/6/2022    Status post pancreas transplantation 11/26/2022 11/2/2022    Type 1 diabetes mellitus with end-stage renal disease (ESRD) 2/24/2015    Followed by Dr. Mayo.  Last A1C was 13   Currently on lantus 20 units qAM and humolog 10 units with meals  - a fetal echocardiogram around 22-24 weeks - needs eye exam, podiatry exam  - needs EKG, maternal echo and fu with cardiology  - ASA 81mg, folic acid 4mg PO daily - hemoglobin A1c every 4-6 weeks -24 hour urine for protein and creatinine clearance should be performed. Patient will also need a       Past Surgical History:   Procedure Laterality Date    AV FISTULA PLACEMENT Left 04/07/2021    Procedure: CREATION, AV FISTULA;  Surgeon: Roberto Ryan MD;  Location: [facility] OR [address] [place] [address];  Service: Peripheral Vascular;  Laterality: Left;    COLONOSCOPY N/A 03/16/2022    Procedure: COLONOSCOPY;  Surgeon: Tavo Kwok MD;  [facility]  Service: Endoscopy;  Laterality: N/A;  Questionable history of delayed gastric emptying longstanding diabetes now on eating pre transplant workup for history of nausea vomiting which seems to have improved with dialysis also chronic diarrhea and history of anemia pre transplant workup for kidney transplant. 3    CYSTOSCOPY      ESOPHAGOGASTRODUODENOSCOPY N/A 03/16/2022    Procedure: EGD (ESOPHAGOGASTRODUODENOSCOPY);  Surgeon: Tavo Kwok MD;  [facility]  Service: Endoscopy;  Laterality: N/A;  Questionable history of delayed gastric emptying longstanding diabetes now on eating pre transplant workup for history of nausea vomiting which seems to have improved with dialysis also chronic diarrhea and history of anemia pre transplant workup for    FISTULOGRAM N/A 08/11/2021    Procedure: Fistulogram;  Surgeon: Roberto Ryan MD;  Location: [facility] CATH LAB;  Service: Cardiology;  Laterality: N/A;    KIDNEY TRANSPLANT Right 11/02/2022    Procedure: TRANSPLANT, KIDNEY;  Surgeon: Kumar Rodriges Jr., MD;  [facility];  Service: Transplant;  Laterality: Right;    LASER PHOTOCOAGULATION OF RETINA Right 05/31/2022    Procedure: PHOTOCOAGULATION, RETINA, USING  LASER;  Surgeon: Maximilian Montaño MD;  Location: [facility] OR 1ST FLR;  Service: Ophthalmology;  Laterality: Right;    PERCUTANEOUS TRANSLUMINAL ANGIOPLASTY OF ARTERIOVENOUS FISTULA N/A 08/11/2021    Procedure: PTA, AV FISTULA;  Surgeon: Roberto Ryan MD;  Location: [facility] CATH LAB;  Service: Cardiology;  Laterality: N/A;    REMOVAL IMPLANT, POSTERIOR SEGMENT, INTRAOCULAR Right 02/01/2022    Procedure: REMOVAL IMPLANT, POSTERIOR SEGMENT, INTRAOCULAR;  Surgeon: Maximilian Montaño MD;  Location: [facility] OR 1ST FLR;  Service: Ophthalmology;  Laterality: Right;    REPAIR OF RETINAL DETACHMENT WITH VITRECTOMY Right 01/25/2022    Procedure: REPAIR, RETINAL DETACHMENT, WITH VITRECTOMY, MEMBRANE PEEL, LASER, INJECTION OF GAS VS OIL;  Surgeon: Maximilian Montaño MD;  [facility];  Service: Ophthalmology;  Laterality: Right;    REPAIR, RETINAL DETACHMENT, COMPLEX, WITH VITRECTOMY AND MEMBRANE PEELING Right 3/21/2023    Procedure: REPAIR,RETINAL DETACHMENT,COMPLEX,WITH VITRECTOMY AND MEMBRANE PEELING;  Surgeon: Maximilian Montaño MD;  [facility];  Service: Ophthalmology;  Laterality: Right;  25ga    REVISION OF ARTERIOVENOUS FISTULA Left 12/10/2021    Procedure: REVISION, AV FISTULA with BVT;  Surgeon: Roberto Ryan MD;  Location: [facility] OR 2ND FLR;  Service: Peripheral Vascular;  Laterality: Left;    TRANSPLANTATION OF PANCREAS N/A 11/02/2022    Procedure: TRANSPLANT, PANCREAS;  Surgeon: Kumar Rodriges Jr., MD;  Location: [facility] OR 2ND FLR;  Service: Transplant;  Laterality: N/A;    VITRECTOMY BY PARS PLANA APPROACH Right 02/01/2022    Procedure: VITRECTOMY, PARS PLANA APPROACH;  Surgeon: Maximilian Montaño MD;  Location: [facility] OR 1ST FLR;  Service: Ophthalmology;  Laterality: Right;    VITRECTOMY BY PARS PLANA APPROACH Right 05/31/2022    Procedure: VITRECTOMY, PARS PLANA APPROACH;  Surgeon: Maximilian Montaño MD;  Location: [facility] OR 1ST FLR;  Service: Ophthalmology;  Laterality:  Right;       Review of patient's allergies indicates:  No Known Allergies    No current facility-administered medications for this encounter.     Current Outpatient Medications   Medication Sig    aspirin (ECOTRIN) 81 MG EC tablet Take 81 mg by mouth once daily.    ketoconazole (NIZORAL) 2 % cream Apply topically 2 (two) times daily as needed for dry areas of face    medroxyPROGESTERone (DEPO-PROVERA) 150 mg/mL Syrg Inject 1 mL (150 mg total) into the muscle every 3 (three) months.    mycophenolate (CELLCEPT) 250 mg Cap Take 2 capsules (500 mg total) by mouth 2 (two) times daily.    predniSONE (DELTASONE) 5 MG tablet Take 1 tablet (5 mg total) by mouth once daily.    promethazine (PHENERGAN) 12.5 MG Tab Take 1 tablet (12.5 mg total) by mouth every 6 (six) hours as needed (nausea).    sodium bicarbonate 650 MG tablet Take 2 tablets (1,300 mg total) by mouth 3 (three) times daily.    tacrolimus (PROGRAF) 1 MG Cap Take 4 capsules (4 mg total) by mouth every 12 (twelve) hours.    tretinoin (RETIN-A) 0.05 % cream Apply topically every evening. Start with every other night and move up to nightly after 2 weeks if not too dry.    tretinoin (RETIN-A) 0.1 % cream Apply topically every evening. Increase Retin A to 0.1%.  Rotate this with old one until weaker strength runs out and then go nightly with 0.1%.     Family History       Problem Relation (Age of Onset)    Diabetes Brother    Heart disease Father    Hypertension Mother          Tobacco Use    Smoking status: Never    Smokeless tobacco: Never   Substance and Sexual Activity    Alcohol use: No    Drug use: No    Sexual activity: Not Currently     Partners: Male     Comment: monogamous     ROS    Constitutional: negative for fevers  Eyes: negative visual changes  ENT: negative for hearing loss  Respiratory: negative for dyspnea  Cardiovascular: negative for chest pain  Gastrointestinal: negative for abdominal pain  Genitourinary: negative for dysuria  Neurological:  "negative for headaches  Behavioral/Psych: negative for hallucinations  Endocrine: negative for temperature intolerance      Objective:     Vital Signs (Most Recent):  Temp: 98.5 °F (36.9 °C) (02/04/24 2315)  Pulse: 109 (02/05/24 0045)  Resp: 18 (02/05/24 0045)  BP: 127/68 (02/05/24 0045)  SpO2: 100 % (02/05/24 0045) Vital Signs (24h Range):  Temp:  [98.5 °F (36.9 °C)-99.3 °F (37.4 °C)] 98.5 °F (36.9 °C)  Pulse:  [100-132] 109  Resp:  [18-23] 18  SpO2:  [96 %-100 %] 100 %  BP: (107-148)/(64-88) 127/68     Weight: 74.8 kg (165 lb)  Height: 5' 4" (162.6 cm)  Body mass index is 28.32 kg/m².    No intake or output data in the 24 hours ending 02/05/24 0100     Ortho/SPM Exam    Gen:  No acute distress, well-developed, well nourished.  CV:  Peripherally well-perfused. 2+ radial pulses, symmetric.  Respiratory:  Normal respiratory effort. No accessory muscle use.   Head/Neck:  Normocephalic.  Atraumatic. Sclera anicteric. TM. Neck supple.  Neuro: CN 2-12 grossly intact. No FND. Awake. Alert. Oriented to person, place, time, and situation.   Abdomen: Soft, NTND.      MSK:  Left Upper Extremity  Inspection  - Skin intact throughout, no open wounds  - No swelling  - No ecchymosis, erythema, or signs of cellulitis  Palpation  - NonTTP throughout, no palpable abnormality   Range of motion  - AROM and PROM of the shoulder, elbow, wrist, and hand intact  Stability  - No evidence of joint dislocation or abnormal laxity   Neurovascular  - AIN/PIN/Radial/Median/Ulnar Nerves assessed in isolation without deficit  - Able to give thumbs up, make "OK" sign, cross IF/LF, abduct/adduct fingers, make fist  - SILT throughout  - Compartments soft  - Radial artery palpated  - Capillary Refill <3s  - Muscle tone normal    Right Upper Extremity  Inspection  - Skin intact throughout, no open wounds  - No swelling  - No ecchymosis, erythema, or signs of cellulitis  Palpation  - NonTTP throughout, no palpable abnormality   Range of motion  - AROM " "and PROM of the shoulder, elbow, wrist, and hand intact  Stability  - No evidence of joint dislocation or abnormal laxity   Neurovascular  - AIN/PIN/Radial/Median/Ulnar Nerves assessed in isolation without deficit  - Able to give thumbs up, make "OK" sign, cross IF/LF, abduct/adduct fingers, make fist  - SILT throughout  - Compartments soft  - Radial artery palpated  - Capillary Refill <3s  - Muscle tone normal      Left Lower Extremity  Inspection  - Skin intact throughout, no open wounds  - No swelling  - No ecchymosis, erythema, or signs of cellulitis  Palpation  - NonTTP throughout, no palpable abnormality   Range of motion  - AROM and PROM of the hip, knee, ankle, and foot intact  Stability  - No evidence of joint dislocation or abnormal laxity  Neurovascular  - TA/EHL/Gastroc/FHL assessed in isolation without deficit  - SILT throughout  - Compartments soft  - DP palpated   - Capillary Refill <3s  - Negative Log roll  - Negative Stinchfield  - Muscle tone normal    Right Lower Extremity  Inspection  - Skin intact throughout, no open wounds  - Swelling present throughout the foot  - No ecchymosis, erythema, or signs of cellulitis  Palpation  - Mild TTP over the dorsum of the metatarsals, otherwise nontender throughout the LLE  Range of motion  - AROM and PROM of the hip, knee, ankle, and foot intact  Stability  - No evidence of joint dislocation or abnormal laxity  Neurovascular  - TA/EHL/Gastroc/FHL assessed in isolation without deficit  - SILT diminished, two point discrimination intact  - Compartments soft  - DP palpated   - Capillary Refill <3s  - Negative Log roll  - Negative Stinchfield  - Muscle tone normal    Spine/pelvis/axial body:  No tenderness to palpation of cervical, thoracic, or lumbar spine  No pain with compression of pelvis  No chest wall or abdominal tenderness  No decubitus ulcers  Muscle tone normal       Significant Labs: CBC:   Recent Labs   Lab 02/04/24  1619   WBC 11.12   HGB 12.1   HCT " 39.9        CMP:   Recent Labs   Lab 02/04/24  1619      K 4.3   *   CO2 15*   *   BUN 21*   CREATININE 1.2   CALCIUM 10.7*   PROT 8.9*   ALBUMIN 4.2   BILITOT 0.7   ALKPHOS 90   AST 16   ALT 14   ANIONGAP 11     All pertinent labs within the past 24 hours have been reviewed.    Significant Imaging: CT: I have reviewed all pertinent results/findings and my personal findings are:  CT right foot shows Lisfranc fracture dislocation, fractures of the 1st through 5th metatarsal bases, and fracture of the 2nd toe proximal phalanx  X-Ray: I have reviewed all pertinent results/findings and my personal findings are:  XR Right foot, Widening of the Lisfranc joint, metatarsal base fractures of MT 1-5 with minimal displacement. Appears to be subacute due to possible callus formation. No significant interval change on weight bearing views.   I have reviewed and interpreted all pertinent imaging results/findings.

## 2024-02-05 NOTE — ASSESSMENT & PLAN NOTE
Isabela Read is a 28 y.o. female with significant medical comorbidity who presents with a likely three-week old right-sided Lisfranc fracture dislocation and 1 through 5th metatarsal base fractures as well as 2nd toe proximal phalanx fracture.  She has been walking on it and even completed workouts with this injury.  She denies any other musculoskeletal pain.    Appears to be at neurovascular baseline, peripheral neuropathy severe enough that she did not feel the initial crush injury and continued to walk on this foot.  Full range of motion present of all the toes and ankle.    Recommend toe-touch weight-bearing in a walking boot at this time.  Pain control per Emergency Department.  However, she does not complain of much pain today unless she bears weight on the right foot.  We will message clinic for follow up, and they will reach out to patient.

## 2024-02-05 NOTE — ED NOTES
"Isabela Read, a 28 y.o. female presents to the ED w/ complaint of leg pain. Pt states her right foot was swollen and came to the ED.     Adult Physical Assessment  LOC: Isabela Read, 28 y.o. female verified via two identifiers.  The patient is awake, alert, oriented and speaking appropriately at this time.  APPEARANCE: Patient resting comfortably and appears to be in no acute distress at this time. Patient is clean and well groomed, patient's clothing is properly fastened.  SKIN:The skin is warm and dry, color consistent with ethnicity, patient has normal skin turgor and moist mucus membranes, skin intact, no breakdown or brusing noted.  MUSCULOSKELETAL: Patient moving all extremities well, no obvious swelling or deformities noted. Right foot pain. Pt states "it feels like a shooting pain through my foot."   RESPIRATORY: Airway is open and patent, respirations are spontaneous, patient has a normal effort and rate, no accessory muscle use noted.  CARDIAC: Patient has a normal rate and rhythm, no periphreal edema noted in any extremity, capillary refill < 3 seconds in all extremities  ABDOMEN: Soft and non tender to palpation, no abdominal distention noted.   NEUROLOGIC: Eyes open spontaneously, behavior appropriate to situation, follows commands, facial expression symmetrical, bilateral hand grasp equal and even, purposeful motor response noted, normal sensation in all extremities when touched with a finger.      Triage note:  Chief Complaint   Patient presents with    Leg Pain     Pain and swelling to r lower leg started on Friday, [place] on [place], denies injury, leg warm to touch, feeling heart beat in chest, kidney xplant nov 2022     Review of patient's allergies indicates:  No Known Allergies  Past Medical History:   Diagnosis Date    Acute cystitis without hematuria 11/25/2022    Acute kidney injury superimposed on chronic kidney disease 4/7/2021    Anemia     Anemia in ESRD " (end-stage renal disease) 7/29/2020    Lab Results  Component Value Date   WBC 7.23 07/29/2020   HGB 7.1 (L) 07/29/2020   HCT 22.2 (L) 07/29/2020   MCV 91 07/29/2020    (H) 07/29/2020   Following with hematology and scheduled to undergo iron infusions    Bacteremia due to Escherichia coli 1/23/2023    Bilious vomiting with nausea 12/12/2022    Chronic hypertension with exacerbation during pregnancy in second trimester 11/6/2020    Current regimen (11/6/20):  - Carvedilol 12.5 mg BID - Nifedipine 30 mg daily Baseline CKD + proteinuria    Chronic kidney disease     Depression     Diabetes mellitus     Diabetic retinopathy     Diarrhea     Encounter for blood transfusion     End stage renal failure on dialysis     Fever 12/1/2022    Fever of undetermined origin 12/12/2022    Gastroparesis     Glaucoma     Hepatomegaly 4/29/2021    History of diabetes mellitus, type I 12/20/2022    Hx of psychiatric care     Hyperlipidemia     Hypertension     Nausea and vomiting 12/12/2022    Nephrotic syndrome     Nephrotic syndrome 3/4/2021    Nonspecific reaction to tuberculin skin test without active tuberculosis 10/1/2021    Orthostatic hypotension 1/25/2023    Palpitations     Poor fetal growth affecting management of mother in second trimester 10/15/2020    Pyelonephritis, acute 11/26/2022    Restrictive lung disease     Retinal detachment     Sepsis 12/1/2022    Sepsis 11/25/2022    Sepsis due to Escherichia coli 1/23/2023    Severe pre-eclampsia in second trimester 11/6/2020    Severe sepsis 11/25/2022    SIRS (systemic inflammatory response syndrome) 12/6/2022    Status post pancreas transplantation 11/26/2022 11/2/2022    Type 1 diabetes mellitus with end-stage renal disease (ESRD) 2/24/2015    Followed by Dr. Mayo.  Last A1C was 13  Currently on lantus 20 units qAM and humolog 10 units with meals  - a fetal echocardiogram around 22-24 weeks - needs eye exam, podiatry exam  - needs EKG, maternal echo and fu with  cardiology  - ASA 81mg, folic acid 4mg PO daily - hemoglobin A1c every 4-6 weeks -24 hour urine for protein and creatinine clearance should be performed. Patient will also need a

## 2024-02-05 NOTE — PROVIDER PROGRESS NOTES - EMERGENCY DEPT.
Encounter Date: 2/4/2024    ED Physician Progress Notes        Physician Note:   Pt signed out to me at 11 PM    Briefly: pt is a 28 year old female with hx of ESRD s/p kidney transplant who presents for evaluation of ankle and foot pain. Work up concerning for lisfranc dislocation. Pt signed out pending orthopedic evaluation. Ortho evaluated the pt in the ED and recommend walking boot, crutches and outpatient follow up. Pt stable to discharge to home. Provided with walking boot, crutches, and orthopedic follow up. Return precautions advised

## 2024-02-05 NOTE — CONSULTS
[facility]  Orthopedics  Consult Note    Patient Name: Isabela Raed  MRN: [id number]  Admission Date: 2/4/2024  Hospital Length of Stay: 0 days  Attending Provider: Mendez Fuller MD  Primary Care Provider: Tavo Koo MD    Subjective:     Past Medical History:   Diagnosis Date    Acute cystitis without hematuria 11/25/2022    Acute kidney injury superimposed on chronic kidney disease 4/7/2021    Anemia     Anemia in ESRD (end-stage renal disease) 7/29/2020    Lab Results  Component Value Date   WBC 7.23 07/29/2020   HGB 7.1 (L) 07/29/2020   HCT 22.2 (L) 07/29/2020   MCV 91 07/29/2020    (H) 07/29/2020   Following with hematology and scheduled to undergo iron infusions    Bacteremia due to Escherichia coli 1/23/2023    Bilious vomiting with nausea 12/12/2022    Chronic hypertension with exacerbation during pregnancy in second trimester 11/6/2020    Current regimen (11/6/20):  - Carvedilol 12.5 mg BID - Nifedipine 30 mg daily Baseline CKD + proteinuria    Chronic kidney disease     Depression     Diabetes mellitus     Diabetic retinopathy     Diarrhea     Encounter for blood transfusion     End stage renal failure on dialysis     Fever 12/1/2022    Fever of undetermined origin 12/12/2022    Gastroparesis     Glaucoma     Hepatomegaly 4/29/2021    History of diabetes mellitus, type I 12/20/2022    Hx of psychiatric care     Hyperlipidemia     Hypertension     Nausea and vomiting 12/12/2022    Nephrotic syndrome     Nephrotic syndrome 3/4/2021    Nonspecific reaction to tuberculin skin test without active tuberculosis 10/1/2021    Orthostatic hypotension 1/25/2023    Palpitations     Poor fetal growth affecting management of mother in second trimester 10/15/2020    Pyelonephritis, acute 11/26/2022    Restrictive lung disease     Retinal detachment     Sepsis 12/1/2022    Sepsis 11/25/2022    Sepsis due to Escherichia coli 1/23/2023    Severe pre-eclampsia in second trimester  11/6/2020    Severe sepsis 11/25/2022    SIRS (systemic inflammatory response syndrome) 12/6/2022    Status post pancreas transplantation 11/26/2022 11/2/2022    Type 1 diabetes mellitus with end-stage renal disease (ESRD) 2/24/2015    Followed by Dr. Mayo.  Last A1C was 13  Currently on lantus 20 units qAM and humolog 10 units with meals  - a fetal echocardiogram around 22-24 weeks - needs eye exam, podiatry exam  - needs EKG, maternal echo and fu with cardiology  - ASA 81mg, folic acid 4mg PO daily - hemoglobin A1c every 4-6 weeks -24 hour urine for protein and creatinine clearance should be performed. Patient will also need a       Past Surgical History:   Procedure Laterality Date    AV FISTULA PLACEMENT Left 04/07/2021    Procedure: CREATION, AV FISTULA;  Surgeon: Roberto Ryan MD;  [facility]  Service: Peripheral Vascular;  Laterality: Left;    COLONOSCOPY N/A 03/16/2022    Procedure: COLONOSCOPY;  Surgeon: Tavo Kwok MD;  [facility]  Service: Endoscopy;  Laterality: N/A;  Questionable history of delayed gastric emptying longstanding diabetes now on eating pre transplant workup for history of nausea vomiting which seems to have improved with dialysis also chronic diarrhea and history of anemia pre transplant workup for kidney transplant. 3    CYSTOSCOPY      ESOPHAGOGASTRODUODENOSCOPY N/A 03/16/2022    Procedure: EGD (ESOPHAGOGASTRODUODENOSCOPY);  Surgeon: Tavo Kwok MD;  Location: [facility] (4TH FLR);  Service: Endoscopy;  Laterality: N/A;  Questionable history of delayed gastric emptying longstanding diabetes now on eating pre transplant workup for history of nausea vomiting which seems to have improved with dialysis also chronic diarrhea and history of anemia pre transplant workup for    FISTULOGRAM N/A 08/11/2021    Procedure: Fistulogram;  Surgeon: Roberto Ryan MD;  Location: [facility] CATH LAB;  Service: Cardiology;  Laterality: N/A;     KIDNEY TRANSPLANT Right 11/02/2022    Procedure: TRANSPLANT, KIDNEY;  Surgeon: Kumar Rodriges Jr., MD;  [facility];  Service: Transplant;  Laterality: Right;    LASER PHOTOCOAGULATION OF RETINA Right 05/31/2022    Procedure: PHOTOCOAGULATION, RETINA, USING LASER;  Surgeon: Maximilian Montaño MD;  [facility];  Service: Ophthalmology;  Laterality: Right;    PERCUTANEOUS TRANSLUMINAL ANGIOPLASTY OF ARTERIOVENOUS FISTULA N/A 08/11/2021    Procedure: PTA, AV FISTULA;  Surgeon: Roberto Ryan MD;  Location: [facility] CATH LAB;  Service: Cardiology;  Laterality: N/A;    REMOVAL IMPLANT, POSTERIOR SEGMENT, INTRAOCULAR Right 02/01/2022    Procedure: REMOVAL IMPLANT, POSTERIOR SEGMENT, INTRAOCULAR;  Surgeon: Maximilian Montaño MD;  [facility];  Service: Ophthalmology;  Laterality: Right;    REPAIR OF RETINAL DETACHMENT WITH VITRECTOMY Right 01/25/2022    Procedure: REPAIR, RETINAL DETACHMENT, WITH VITRECTOMY, MEMBRANE PEEL, LASER, INJECTION OF GAS VS OIL;  Surgeon: Maximilian Montaño MD;  [facility];  Service: Ophthalmology;  Laterality: Right;    REPAIR, RETINAL DETACHMENT, COMPLEX, WITH VITRECTOMY AND MEMBRANE PEELING Right 3/21/2023    Procedure: REPAIR,RETINAL DETACHMENT,COMPLEX,WITH VITRECTOMY AND MEMBRANE PEELING;  Surgeon: Maximilian Montaño MD;  [facility];  Service: Ophthalmology;  Laterality: Right;  25ga    REVISION OF ARTERIOVENOUS FISTULA Left 12/10/2021    Procedure: REVISION, AV FISTULA with BVT;  Surgeon: Roberto Ryan MD;  [facility];  Service: Peripheral Vascular;  Laterality: Left;    TRANSPLANTATION OF PANCREAS N/A 11/02/2022    Procedure: TRANSPLANT, PANCREAS;  Surgeon: Kumar Rodriges Jr., MD;  [facility];  Service: Transplant;  Laterality: N/A;    VITRECTOMY BY PARS PLANA APPROACH Right 02/01/2022    Procedure: VITRECTOMY, PARS PLANA APPROACH;  Surgeon: Maximilian Montaño MD;   Location: [facility] OR [address] [place] [address];  Service: Ophthalmology;  Laterality: Right;    VITRECTOMY BY PARS PLANA APPROACH Right 05/31/2022    Procedure: VITRECTOMY, PARS PLANA APPROACH;  Surgeon: Maximilian Montaño MD;  Location: [facility] OR [address] [place] [address];  Service: Ophthalmology;  Laterality: Right;       Review of patient's allergies indicates:  No Known Allergies    No current facility-administered medications for this encounter.     Current Outpatient Medications   Medication Sig    aspirin (ECOTRIN) 81 MG EC tablet Take 81 mg by mouth once daily.    ketoconazole (NIZORAL) 2 % cream Apply topically 2 (two) times daily as needed for dry areas of face    medroxyPROGESTERone (DEPO-PROVERA) 150 mg/mL Syrg Inject 1 mL (150 mg total) into the muscle every 3 (three) months.    mycophenolate (CELLCEPT) 250 mg Cap Take 2 capsules (500 mg total) by mouth 2 (two) times daily.    predniSONE (DELTASONE) 5 MG tablet Take 1 tablet (5 mg total) by mouth once daily.    promethazine (PHENERGAN) 12.5 MG Tab Take 1 tablet (12.5 mg total) by mouth every 6 (six) hours as needed (nausea).    sodium bicarbonate 650 MG tablet Take 2 tablets (1,300 mg total) by mouth 3 (three) times daily.    tacrolimus (PROGRAF) 1 MG Cap Take 4 capsules (4 mg total) by mouth every 12 (twelve) hours.    tretinoin (RETIN-A) 0.05 % cream Apply topically every evening. Start with every other night and move up to nightly after 2 weeks if not too dry.    tretinoin (RETIN-A) 0.1 % cream Apply topically every evening. Increase Retin A to 0.1%.  Rotate this with old one until weaker strength runs out and then go nightly with 0.1%.     Family History       Problem Relation (Age of Onset)    Diabetes Brother    Heart disease Father    Hypertension Mother          Tobacco Use    Smoking status: Never    Smokeless tobacco: Never   Substance and Sexual Activity    Alcohol use: No    Drug use: No    Sexual activity: Not Currently     Partners: Male     Comment: monogamous  "    ROS    Constitutional: negative for fevers  Eyes: negative visual changes  ENT: negative for hearing loss  Respiratory: negative for dyspnea  Cardiovascular: negative for chest pain  Gastrointestinal: negative for abdominal pain  Genitourinary: negative for dysuria  Neurological: negative for headaches  Behavioral/Psych: negative for hallucinations  Endocrine: negative for temperature intolerance      Objective:     Vital Signs (Most Recent):  Temp: 98.5 °F (36.9 °C) (02/04/24 2315)  Pulse: 109 (02/05/24 0045)  Resp: 18 (02/05/24 0045)  BP: 127/68 (02/05/24 0045)  SpO2: 100 % (02/05/24 0045) Vital Signs (24h Range):  Temp:  [98.5 °F (36.9 °C)-99.3 °F (37.4 °C)] 98.5 °F (36.9 °C)  Pulse:  [100-132] 109  Resp:  [18-23] 18  SpO2:  [96 %-100 %] 100 %  BP: (107-148)/(64-88) 127/68     Weight: 74.8 kg (165 lb)  Height: 5' 4" (162.6 cm)  Body mass index is 28.32 kg/m².    No intake or output data in the 24 hours ending 02/05/24 0100     Ortho/SPM Exam    Gen:  No acute distress, well-developed, well nourished.  CV:  Peripherally well-perfused. 2+ radial pulses, symmetric.  Respiratory:  Normal respiratory effort. No accessory muscle use.   Head/Neck:  Normocephalic.  Atraumatic. Sclera anicteric. TM. Neck supple.  Neuro: CN 2-12 grossly intact. No FND. Awake. Alert. Oriented to person, place, time, and situation.   Abdomen: Soft, NTND.      MSK:  Left Upper Extremity  Inspection  - Skin intact throughout, no open wounds  - No swelling  - No ecchymosis, erythema, or signs of cellulitis  Palpation  - NonTTP throughout, no palpable abnormality   Range of motion  - AROM and PROM of the shoulder, elbow, wrist, and hand intact  Stability  - No evidence of joint dislocation or abnormal laxity   Neurovascular  - AIN/PIN/Radial/Median/Ulnar Nerves assessed in isolation without deficit  - Able to give thumbs up, make "OK" sign, cross IF/LF, abduct/adduct fingers, make fist  - SILT throughout  - Compartments soft  - Radial artery " "palpated  - Capillary Refill <3s  - Muscle tone normal    Right Upper Extremity  Inspection  - Skin intact throughout, no open wounds  - No swelling  - No ecchymosis, erythema, or signs of cellulitis  Palpation  - NonTTP throughout, no palpable abnormality   Range of motion  - AROM and PROM of the shoulder, elbow, wrist, and hand intact  Stability  - No evidence of joint dislocation or abnormal laxity   Neurovascular  - AIN/PIN/Radial/Median/Ulnar Nerves assessed in isolation without deficit  - Able to give thumbs up, make "OK" sign, cross IF/LF, abduct/adduct fingers, make fist  - SILT throughout  - Compartments soft  - Radial artery palpated  - Capillary Refill <3s  - Muscle tone normal      Left Lower Extremity  Inspection  - Skin intact throughout, no open wounds  - No swelling  - No ecchymosis, erythema, or signs of cellulitis  Palpation  - NonTTP throughout, no palpable abnormality   Range of motion  - AROM and PROM of the hip, knee, ankle, and foot intact  Stability  - No evidence of joint dislocation or abnormal laxity  Neurovascular  - TA/EHL/Gastroc/FHL assessed in isolation without deficit  - SILT throughout  - Compartments soft  - DP palpated   - Capillary Refill <3s  - Negative Log roll  - Negative Stinchfield  - Muscle tone normal    Right Lower Extremity  Inspection  - Skin intact throughout, no open wounds  - Swelling present throughout the foot  - No ecchymosis, erythema, or signs of cellulitis  Palpation  - Mild TTP over the dorsum of the metatarsals, otherwise nontender throughout the LLE  Range of motion  - AROM and PROM of the hip, knee, ankle, and foot intact  Stability  - No evidence of joint dislocation or abnormal laxity  Neurovascular  - TA/EHL/Gastroc/FHL assessed in isolation without deficit  - SILT diminished, two point discrimination intact  - Compartments soft  - DP palpated   - Capillary Refill <3s  - Negative Log roll  - Negative Stinchfield  - Muscle tone " normal    Spine/pelvis/axial body:  No tenderness to palpation of cervical, thoracic, or lumbar spine  No pain with compression of pelvis  No chest wall or abdominal tenderness  No decubitus ulcers  Muscle tone normal       Significant Labs: CBC:   Recent Labs   Lab 02/04/24  1619   WBC 11.12   HGB 12.1   HCT 39.9        CMP:   Recent Labs   Lab 02/04/24  1619      K 4.3   *   CO2 15*   *   BUN 21*   CREATININE 1.2   CALCIUM 10.7*   PROT 8.9*   ALBUMIN 4.2   BILITOT 0.7   ALKPHOS 90   AST 16   ALT 14   ANIONGAP 11     All pertinent labs within the past 24 hours have been reviewed.    Significant Imaging: CT: I have reviewed all pertinent results/findings and my personal findings are:  CT right foot shows Lisfranc fracture dislocation, fractures of the 1st through 5th metatarsal bases, and fracture of the 2nd toe proximal phalanx  X-Ray: I have reviewed all pertinent results/findings and my personal findings are:  XR Right foot, Widening of the Lisfranc joint, metatarsal base fractures of MT 1-5 with minimal displacement. Appears to be subacute due to possible callus formation. No significant interval change on weight bearing views.   I have reviewed and interpreted all pertinent imaging results/findings.  Assessment/Plan:     * Lisfranc dislocation, right, initial encounter  Isabela Read is a 28 y.o. female with significant medical comorbidity who presents with a likely three-week old right-sided Lisfranc fracture dislocation and 1 through 5th metatarsal base fractures as well as 2nd toe proximal phalanx fracture.  She has been walking on it and even completed workouts with this injury.  She denies any other musculoskeletal pain.    Appears to be at neurovascular baseline, peripheral neuropathy severe enough that she did not feel the initial crush injury and continued to walk on this foot.  Full range of motion present of all the toes and ankle.    Recommend toe-touch  weight-bearing in a walking boot at this time.  Pain control per Emergency Department.  However, she does not complain of much pain today unless she bears weight on the right foot.  We will message clinic for follow up, and they will reach out to patient.          JAMES STILL MD  Orthopedics  [facility]

## 2024-02-06 ENCOUNTER — OFFICE VISIT (OUTPATIENT)
Dept: ORTHOPEDICS | Facility: CLINIC | Age: 29
End: 2024-02-06
Payer: MEDICARE

## 2024-02-06 VITALS — BODY MASS INDEX: 28.15 KG/M2 | WEIGHT: 164.88 LBS | HEIGHT: 64 IN

## 2024-02-06 DIAGNOSIS — S93.324A DISLOCATION OF TARSOMETATARSAL JOINT OF RIGHT FOOT, INITIAL ENCOUNTER: ICD-10-CM

## 2024-02-06 DIAGNOSIS — S92.301A MULTIPLE CLOSED FRACTURES OF METATARSAL BONE OF RIGHT FOOT, INITIAL ENCOUNTER: Primary | ICD-10-CM

## 2024-02-06 PROCEDURE — 99999 PR PBB SHADOW E&M-EST. PATIENT-LVL III: CPT | Mod: PBBFAC,,, | Performed by: ORTHOPAEDIC SURGERY

## 2024-02-06 PROCEDURE — 99203 OFFICE O/P NEW LOW 30 MIN: CPT | Mod: S$GLB,,, | Performed by: ORTHOPAEDIC SURGERY

## 2024-02-06 NOTE — PROGRESS NOTES
Subjective:   Chief complaint:   Chief Complaint   Patient presents with    Right Foot - Pain     Referring provider: Dr. Mikel Charles     HPI:   Isabela Read is a 28 y.o. female with past medical history of type 1 diabetes status post kidney pancreas transplant currently on CellCept and Prograf who presents today for evaluation of right foot pain.  Pain has been ongoing for approximately 3 weeks.  Inciting event: injury.    She reports that she was working out doing steps when she was entering her foot to assist with sit-ups and sustained an injury to her right foot.  She initially attributed this to her known peripheral neuropathy.  She denies any edema at that time, but does report some mild pain.  Over the weekend she marked in the EMR D cooperate and had the onset of increased pain along with edema to the foot.  This prompted her presentation to the emergency department.  X-rays obtained in the emergency department showed multiple metatarsal fractures and Lisfranc injury.  She presents for follow-up.  No previous injury or surgery to the right foot.    She works as an [employer] .      Objective:       Standing examination demonstrates deferred due to pain.  There is dorsal foot swelling swelling.  There is dorsal foot ecchymosis.  Medial longitudinal arch is neutral.     Focused exam of the right lower extremity demonstrates decreased ankle range of motion secondary to foot pain.  Hindfoot is flexible. Tender to palpation to the mid foot overlying the base of the metatarsals.    Fires TA/GSC/PTT/peroneals.  SILT SP/DP/PT and able to localize.  Warm well perfused.      Imaging:  I independently reviewed and interpreted the imaging and my findings are as follows:       X-ray foot and CT foot demonstrate 1st through 5th metatarsal base fractures that are comminuted and minimally displaced in nature with widening of the metatarsal space between the 1st and 2nd metatarsal consistent with possible  "Lisfranc injury, middle and medial cuneiforms rate and close proximity      Assessment:     1. Multiple closed fractures of metatarsal bone of right foot, initial encounter    2. Dislocation of tarsometatarsal joint of right foot, initial encounter         Patient is seen for a new acute injury  with ADLs and/or QOL likely to be impacted without treatment.    Data:  Imaging results independently interpreted and   prior notes reviewed    Treatment plan: Counseling regarding non-operative treatment and possibility of surgery in future if persistent morbidity from symptoms        Plan:       Nonweightbearing right lower extremity in boot, knee scooter provided for rental to the patient until she is able to obtain one for herself  Follow up in clinic in 2 weeks with repeat x-rays of right foot    I have personally taken the history and examined this patient and agree with the residents note as stated above.        Orders Placed This Encounter   Procedures    HME - OTHER     Order Specific Question:   Type of Equipment:     Answer:   Knee scooter     Order Specific Question:   Height:     Answer:   5' 4" (1.626 m)     Order Specific Question:   Weight:     Answer:   74.8 kg (164 lb 14.5 oz)       Past Medical History:   Diagnosis Date    Acute cystitis without hematuria 11/25/2022    Acute kidney injury superimposed on chronic kidney disease 4/7/2021    Anemia     Anemia in ESRD (end-stage renal disease) 7/29/2020    Lab Results  Component Value Date   WBC 7.23 07/29/2020   HGB 7.1 (L) 07/29/2020   HCT 22.2 (L) 07/29/2020   MCV 91 07/29/2020    (H) 07/29/2020   Following with hematology and scheduled to undergo iron infusions    Bacteremia due to Escherichia coli 1/23/2023    Bilious vomiting with nausea 12/12/2022    Chronic hypertension with exacerbation during pregnancy in second trimester 11/6/2020    Current regimen (11/6/20):  - Carvedilol 12.5 mg BID - Nifedipine 30 mg daily Baseline CKD + proteinuria    " Chronic kidney disease     Depression     Diabetes mellitus     Diabetic retinopathy     Diarrhea     Encounter for blood transfusion     End stage renal failure on dialysis     Fever 12/1/2022    Fever of undetermined origin 12/12/2022    Gastroparesis     Glaucoma     Hepatomegaly 4/29/2021    History of diabetes mellitus, type I 12/20/2022    Hx of psychiatric care     Hyperlipidemia     Hypertension     Nausea and vomiting 12/12/2022    Nephrotic syndrome     Nephrotic syndrome 3/4/2021    Nonspecific reaction to tuberculin skin test without active tuberculosis 10/1/2021    Orthostatic hypotension 1/25/2023    Palpitations     Poor fetal growth affecting management of mother in second trimester 10/15/2020    Pyelonephritis, acute 11/26/2022    Restrictive lung disease     Retinal detachment     Sepsis 12/1/2022    Sepsis 11/25/2022    Sepsis due to Escherichia coli 1/23/2023    Severe pre-eclampsia in second trimester 11/6/2020    Severe sepsis 11/25/2022    SIRS (systemic inflammatory response syndrome) 12/6/2022    Status post pancreas transplantation 11/26/2022 11/2/2022    Type 1 diabetes mellitus with end-stage renal disease (ESRD) 2/24/2015    Followed by Dr. Mayo.  Last A1C was 13  Currently on lantus 20 units qAM and humolog 10 units with meals  - a fetal echocardiogram around 22-24 weeks - needs eye exam, podiatry exam  - needs EKG, maternal echo and fu with cardiology  - ASA 81mg, folic acid 4mg PO daily - hemoglobin A1c every 4-6 weeks -24 hour urine for protein and creatinine clearance should be performed. Patient will also need a       Past Surgical History:   Procedure Laterality Date    AV FISTULA PLACEMENT Left 04/07/2021    Procedure: CREATION, AV FISTULA;  Surgeon: Roberto Ryan MD;  Location: [facility] OR 2ND FLR;  Service: Peripheral Vascular;  Laterality: Left;    COLONOSCOPY N/A 03/16/2022    Procedure: COLONOSCOPY;  Surgeon: Tavo Kwok MD;  Location: [facility] (4TH FLR);   Service: Endoscopy;  Laterality: N/A;  Questionable history of delayed gastric emptying longstanding diabetes now on eating pre transplant workup for history of nausea vomiting which seems to have improved with dialysis also chronic diarrhea and history of anemia pre transplant workup for kidney transplant. 3    CYSTOSCOPY      ESOPHAGOGASTRODUODENOSCOPY N/A 03/16/2022    Procedure: EGD (ESOPHAGOGASTRODUODENOSCOPY);  Surgeon: Tavo Kwok MD;  Location: [facility] (4TH FLR);  Service: Endoscopy;  Laterality: N/A;  Questionable history of delayed gastric emptying longstanding diabetes now on eating pre transplant workup for history of nausea vomiting which seems to have improved with dialysis also chronic diarrhea and history of anemia pre transplant workup for    FISTULOGRAM N/A 08/11/2021    Procedure: Fistulogram;  Surgeon: Roberto Ryan MD;  Location: [facility] CATH LAB;  Service: Cardiology;  Laterality: N/A;    KIDNEY TRANSPLANT Right 11/02/2022    Procedure: TRANSPLANT, KIDNEY;  Surgeon: Kumar Rodriges Jr., MD;  Location: [facility] 2ND FLR;  Service: Transplant;  Laterality: Right;    LASER PHOTOCOAGULATION OF RETINA Right 05/31/2022    Procedure: PHOTOCOAGULATION, RETINA, USING LASER;  Surgeon: Maximilian Montaño MD;  Location: [facility] OR 1ST FLR;  Service: Ophthalmology;  Laterality: Right;    PERCUTANEOUS TRANSLUMINAL ANGIOPLASTY OF ARTERIOVENOUS FISTULA N/A 08/11/2021    Procedure: PTA, AV FISTULA;  Surgeon: Roberto Ryan MD;  Location: [facility] CATH LAB;  Service: Cardiology;  Laterality: N/A;    REMOVAL IMPLANT, POSTERIOR SEGMENT, INTRAOCULAR Right 02/01/2022    Procedure: REMOVAL IMPLANT, POSTERIOR SEGMENT, INTRAOCULAR;  Surgeon: Maixmilian Montaño MD;  Location: [facility] OR 1ST FLR;  Service: Ophthalmology;  Laterality: Right;    REPAIR OF RETINAL DETACHMENT WITH VITRECTOMY Right 01/25/2022    Procedure: REPAIR, RETINAL DETACHMENT, WITH VITRECTOMY, MEMBRANE PEEL, LASER, INJECTION OF GAS VS  OIL;  Surgeon: Maximilian Montaño MD;  Location: [facility] OR 1ST FLR;  Service: Ophthalmology;  Laterality: Right;    REPAIR, RETINAL DETACHMENT, COMPLEX, WITH VITRECTOMY AND MEMBRANE PEELING Right 3/21/2023    Procedure: REPAIR,RETINAL DETACHMENT,COMPLEX,WITH VITRECTOMY AND MEMBRANE PEELING;  Surgeon: Maximilian Montaño MD;  Location: [facility] OR 1ST FLR;  Service: Ophthalmology;  Laterality: Right;  25ga    REVISION OF ARTERIOVENOUS FISTULA Left 12/10/2021    Procedure: REVISION, AV FISTULA with BVT;  Surgeon: Roberto Ryan MD;  Location: [facility] OR 2ND FLR;  Service: Peripheral Vascular;  Laterality: Left;    TRANSPLANTATION OF PANCREAS N/A 11/02/2022    Procedure: TRANSPLANT, PANCREAS;  Surgeon: Kumar Rodriges Jr., MD;  Location: [facility] OR 2ND FLR;  Service: Transplant;  Laterality: N/A;    VITRECTOMY BY PARS PLANA APPROACH Right 02/01/2022    Procedure: VITRECTOMY, PARS PLANA APPROACH;  Surgeon: Maximilian Montaño MD;  Location: [facility] OR 1ST FLR;  Service: Ophthalmology;  Laterality: Right;    VITRECTOMY BY PARS PLANA APPROACH Right 05/31/2022    Procedure: VITRECTOMY, PARS PLANA APPROACH;  Surgeon: Maximilian Montaño MD;  Location: [facility] OR 1ST FLR;  Service: Ophthalmology;  Laterality: Right;       Family History   Problem Relation Age of Onset    Hypertension Mother     Heart disease Father     Diabetes Brother     Celiac disease Neg Hx     Cirrhosis Neg Hx     Colon cancer Neg Hx     Colon polyps Neg Hx     Crohn's disease Neg Hx     Inflammatory bowel disease Neg Hx     Liver cancer Neg Hx     Liver disease Neg Hx     Rectal cancer Neg Hx     Stomach cancer Neg Hx     Ulcerative colitis Neg Hx     Esophageal cancer Neg Hx     Hemochromatosis Neg Hx     Pancreatic cancer Neg Hx     Kidney cancer Neg Hx     Bladder Cancer Neg Hx     Uterine cancer Neg Hx     Ovarian cancer Neg Hx        Social History     Socioeconomic History    Marital status: Single   Occupational History    Occupation:  at  [employer]   Tobacco Use    Smoking status: Never    Smokeless tobacco: Never   Substance and Sexual Activity    Alcohol use: No    Drug use: No    Sexual activity: Not Currently     Partners: Male     Comment: monogamous   Social History Narrative    Caregiver        Single    No kids    Had Miscarriage  At 23 weeks from preeclampsia    Disabled

## 2024-02-23 ENCOUNTER — OFFICE VISIT (OUTPATIENT)
Dept: ORTHOPEDICS | Facility: CLINIC | Age: 29
End: 2024-02-23
Payer: MEDICARE

## 2024-02-23 ENCOUNTER — HOSPITAL ENCOUNTER (OUTPATIENT)
Dept: RADIOLOGY | Facility: HOSPITAL | Age: 29
Discharge: HOME | End: 2024-02-23
Attending: ORTHOPAEDIC SURGERY
Payer: MEDICARE

## 2024-02-23 VITALS — BODY MASS INDEX: 28.15 KG/M2 | HEIGHT: 64 IN | WEIGHT: 164.88 LBS

## 2024-02-23 DIAGNOSIS — S93.324D DISLOCATION OF TARSOMETATARSAL JOINT OF RIGHT FOOT, SUBSEQUENT ENCOUNTER: Primary | ICD-10-CM

## 2024-02-23 DIAGNOSIS — S93.324A DISLOCATION OF TARSOMETATARSAL JOINT OF RIGHT FOOT, INITIAL ENCOUNTER: ICD-10-CM

## 2024-02-23 DIAGNOSIS — M14.679 CHARCOT ARTHROPATHY OF MIDFOOT: ICD-10-CM

## 2024-02-23 PROCEDURE — 99999 PR PBB SHADOW E&M-EST. PATIENT-LVL III: CPT | Mod: PBBFAC,,, | Performed by: ORTHOPAEDIC SURGERY

## 2024-02-23 PROCEDURE — 73630 X-RAY EXAM OF FOOT: CPT | Mod: TC,RT

## 2024-02-23 PROCEDURE — 73630 X-RAY EXAM OF FOOT: CPT | Mod: 26,RT,, | Performed by: RADIOLOGY

## 2024-02-23 PROCEDURE — 99213 OFFICE O/P EST LOW 20 MIN: CPT | Mod: S$GLB,,, | Performed by: ORTHOPAEDIC SURGERY

## 2024-02-23 NOTE — PROGRESS NOTES
Isabela Read  Returns today for follow-up.  This is a 28-year-old female with a history of type 1 diabetes and peripheral neuropathy and is status post a kidney pancreas transplant in his currently on CellCept and Prograf who presented to me initially on 02/06/2020 for about three weeks after injuring her right foot while doing sit-ups.  She developed swelling of the foot and she presented to the emergency room where x-rays revealed multiple metatarsal fractures and a Lisfranc injury.  X-rays and a CT scan demonstrated fractures of the 1st through 5th metatarsal bases and Lisfranc injury.  Due to her medical history I recommended close treatment with nonweightbearing on the right foot I had her return today to repeat x-rays to see if there is any further displacement.  She reports that her swelling has gone down.    Examination:  She comes in today with her knee scooter.  Inspection of the right foot does reveals some improvement of her swelling although there is still swelling noted.  The overall alignment of the foot appears unchanged.  There is no significant tenderness.    Imaging:  I ordered and reviewed reviewed x-ray of the right foot today.  X-ray does not reveal any further displacement of the Lisfranc dislocation.     Impression:  1. Dislocation of tarsometatarsal joint of right foot, subsequent encounter  X-Ray Foot Complete Right      2. Charcot arthropathy of midfoot          Recommendation:  There has been no further displacement of her fractures or the alignment of the midfoot joints.  I would recommend continued close treatment with nonweightbearing.    Follow-up in four weeks with repeat x-ray right foot

## 2024-02-27 ENCOUNTER — LAB VISIT (OUTPATIENT)
Dept: LAB | Facility: HOSPITAL | Age: 29
End: 2024-02-27
Attending: INTERNAL MEDICINE
Payer: MEDICARE

## 2024-02-27 DIAGNOSIS — Z94.83 STATUS POST PANCREAS TRANSPLANTATION: ICD-10-CM

## 2024-02-27 DIAGNOSIS — Z94.0 KIDNEY REPLACED BY TRANSPLANT: ICD-10-CM

## 2024-02-27 PROCEDURE — 86833 HLA CLASS II HIGH DEFIN QUAL: CPT | Mod: PO | Performed by: INTERNAL MEDICINE

## 2024-02-27 PROCEDURE — 86832 HLA CLASS I HIGH DEFIN QUAL: CPT | Mod: PO | Performed by: INTERNAL MEDICINE

## 2024-02-27 PROCEDURE — 86977 RBC SERUM PRETX INCUBJ/INHIB: CPT | Mod: PO | Performed by: INTERNAL MEDICINE

## 2024-03-12 ENCOUNTER — PATIENT MESSAGE (OUTPATIENT)
Dept: TRANSPLANT | Facility: CLINIC | Age: 29
End: 2024-03-12
Payer: MEDICARE

## 2024-03-12 DIAGNOSIS — Z94.0 KIDNEY REPLACED BY TRANSPLANT: ICD-10-CM

## 2024-03-12 RX ORDER — MYCOPHENOLATE MOFETIL 250 MG/1
750 CAPSULE ORAL 2 TIMES DAILY
Qty: 180 CAPSULE | Refills: 11 | Status: ON HOLD | OUTPATIENT
Start: 2024-03-12 | End: 2024-04-08

## 2024-03-12 NOTE — TELEPHONE ENCOUNTER
Message sent to patient via [facility] detailing dosage change.    ----- Message from Tammie Broussard DO sent at 3/12/2024  9:57 AM CDT -----  Increase cellcept to 750 mg BID  Monitor pancreatic function for now

## 2024-03-16 RX ORDER — PROMETHAZINE HYDROCHLORIDE 12.5 MG/1
12.5 TABLET ORAL EVERY 6 HOURS PRN
Qty: 30 TABLET | Refills: 1 | Status: CANCELLED | OUTPATIENT
Start: 2024-03-16

## 2024-03-19 DIAGNOSIS — Z94.83 STATUS POST PANCREAS TRANSPLANTATION: Primary | ICD-10-CM

## 2024-03-28 ENCOUNTER — HOSPITAL ENCOUNTER (OUTPATIENT)
Dept: RADIOLOGY | Facility: HOSPITAL | Age: 29
Discharge: HOME | End: 2024-03-28
Attending: ORTHOPAEDIC SURGERY
Payer: MEDICARE

## 2024-03-28 ENCOUNTER — OFFICE VISIT (OUTPATIENT)
Dept: ORTHOPEDICS | Facility: CLINIC | Age: 29
End: 2024-03-28
Payer: MEDICARE

## 2024-03-28 DIAGNOSIS — M14.679 CHARCOT ARTHROPATHY OF MIDFOOT: ICD-10-CM

## 2024-03-28 DIAGNOSIS — M14.679 CHARCOT ARTHROPATHY OF MIDFOOT: Primary | ICD-10-CM

## 2024-03-28 DIAGNOSIS — S92.301D MULTIPLE CLOSED FRACTURES OF METATARSAL BONE OF RIGHT FOOT WITH ROUTINE HEALING, SUBSEQUENT ENCOUNTER: ICD-10-CM

## 2024-03-28 PROCEDURE — 99212 OFFICE O/P EST SF 10 MIN: CPT | Mod: S$GLB,,, | Performed by: ORTHOPAEDIC SURGERY

## 2024-03-28 PROCEDURE — 73630 X-RAY EXAM OF FOOT: CPT | Mod: 26,RT,, | Performed by: RADIOLOGY

## 2024-03-28 PROCEDURE — 73630 X-RAY EXAM OF FOOT: CPT | Mod: TC,RT

## 2024-03-28 PROCEDURE — 99999 PR PBB SHADOW E&M-EST. PATIENT-LVL II: CPT | Mod: PBBFAC,,, | Performed by: ORTHOPAEDIC SURGERY

## 2024-03-28 NOTE — PROGRESS NOTES
Isabela Read  Returns today for follow-up.  This is a 29-year-old female with a history of type 1 diabetes and peripheral neuropathy as well as a kidney pancreas transplant who presented to me on 02/06/2024 about three weeks after injuring her right foot while doing sit-ups.  She went to the emergency room where x-rays revealed multiple metatarsal fractures and a Lisfranc injury.  Due to her medical history I recommended close treatment with nonweightbearing on the right foot and she returned to see me on 02/23/2024 and x-rays did not show any further displacement.  She returns today and reports overall she is doing well.  She does not report any pain, but she really has not had much pain due to her neuropathy.  She does report some continued swelling which has mainly focused around the midfoot in his no longer diffuse foot and ankle swelling.    Examination:  The right foot does reveals some swelling about the midfoot and forefoot.  There is no swelling about the ankle or hindfoot.    Imaging:  I ordered reviewed x-rays of her right foot today.  There has been no change in the alignment of the fractures.  There is new bone formation occurring.    Impression:  1. Charcot arthropathy of midfoot  X-Ray Foot Complete Right      2. Multiple closed fractures of metatarsal bone of right foot with routine healing, subsequent encounter              Recommendation:  It appears that the fractures are stable and new bone is forming.  Because of her significant neuropathy I want her to continue protected weight-bearing for the next four weeks.    Follow-up in four weeks with repeat x-ray right foot

## 2024-04-06 ENCOUNTER — HOSPITAL ENCOUNTER (INPATIENT)
Facility: HOSPITAL | Age: 29
LOS: 2 days | Discharge: HOME | DRG: 872 | End: 2024-04-08
Attending: STUDENT IN AN ORGANIZED HEALTH CARE EDUCATION/TRAINING PROGRAM | Admitting: HOSPITALIST
Payer: MEDICARE

## 2024-04-06 DIAGNOSIS — Z94.0 KIDNEY REPLACED BY TRANSPLANT: ICD-10-CM

## 2024-04-06 DIAGNOSIS — A49.9 BACTERIAL UTI: ICD-10-CM

## 2024-04-06 DIAGNOSIS — N39.0 BACTERIAL UTI: ICD-10-CM

## 2024-04-06 DIAGNOSIS — A41.9 SEPSIS: Primary | ICD-10-CM

## 2024-04-06 DIAGNOSIS — Z94.0 STATUS POST DECEASED-DONOR KIDNEY TRANSPLANTATION: Chronic | ICD-10-CM

## 2024-04-06 DIAGNOSIS — R11.2 NAUSEA AND VOMITING, UNSPECIFIED VOMITING TYPE: ICD-10-CM

## 2024-04-06 PROBLEM — Z79.52 LONG TERM SYSTEMIC STEROID USER: Chronic | Status: ACTIVE | Noted: 2023-01-05

## 2024-04-06 PROBLEM — N17.9 ACUTE KIDNEY INJURY SUPERIMPOSED ON CHRONIC KIDNEY DISEASE: Status: RESOLVED | Noted: 2021-04-07 | Resolved: 2024-04-06

## 2024-04-06 PROBLEM — N18.9 ACUTE KIDNEY INJURY SUPERIMPOSED ON CHRONIC KIDNEY DISEASE: Status: RESOLVED | Noted: 2021-04-07 | Resolved: 2024-04-06

## 2024-04-06 PROBLEM — Z79.60 LONG-TERM USE OF IMMUNOSUPPRESSANT MEDICATION: Chronic | Status: ACTIVE | Noted: 2022-11-07

## 2024-04-06 PROBLEM — D64.9 NORMOCYTIC ANEMIA: Status: ACTIVE | Noted: 2024-04-06

## 2024-04-06 PROBLEM — Z86.39 HISTORY OF DIABETES MELLITUS, TYPE I: Chronic | Status: ACTIVE | Noted: 2022-12-20

## 2024-04-06 PROBLEM — Z94.83 PANCREAS REPLACED BY TRANSPLANT: Chronic | Status: ACTIVE | Noted: 2022-11-26

## 2024-04-06 PROBLEM — I50.30 (HFPEF) HEART FAILURE WITH PRESERVED EJECTION FRACTION: Chronic | Status: ACTIVE | Noted: 2022-05-25

## 2024-04-06 PROBLEM — D63.8 ANEMIA OF CHRONIC DISEASE: Status: RESOLVED | Noted: 2022-11-07 | Resolved: 2024-04-06

## 2024-04-06 PROBLEM — J84.9 INTERSTITIAL PULMONARY DISEASE, UNSPECIFIED: Chronic | Status: ACTIVE | Noted: 2023-04-21

## 2024-04-06 PROBLEM — E10.3521: Chronic | Status: ACTIVE | Noted: 2022-02-02

## 2024-04-06 LAB
ALBUMIN SERPL BCP-MCNC: 4 G/DL (ref 3.5–5.2)
ALLENS TEST: ABNORMAL
ALLENS TEST: ABNORMAL
ALLENS TEST: NORMAL
ALP SERPL-CCNC: 63 U/L (ref 55–135)
ALT SERPL W/O P-5'-P-CCNC: 10 U/L (ref 10–44)
ANION GAP SERPL CALC-SCNC: 12 MMOL/L (ref 8–16)
AST SERPL-CCNC: 12 U/L (ref 10–40)
BACTERIA #/AREA URNS AUTO: ABNORMAL /HPF
BASOPHILS # BLD AUTO: 0.02 K/UL (ref 0–0.2)
BASOPHILS NFR BLD: 0.2 % (ref 0–1.9)
BILIRUB SERPL-MCNC: 0.7 MG/DL (ref 0.1–1)
BILIRUB UR QL STRIP: NEGATIVE
BUN SERPL-MCNC: 26 MG/DL (ref 6–20)
CALCIUM SERPL-MCNC: 9.6 MG/DL (ref 8.7–10.5)
CHLORIDE SERPL-SCNC: 111 MMOL/L (ref 95–110)
CLARITY UR REFRACT.AUTO: CLEAR
CO2 SERPL-SCNC: 16 MMOL/L (ref 23–29)
COLOR UR AUTO: YELLOW
CREAT SERPL-MCNC: 1.1 MG/DL (ref 0.5–1.4)
DIFFERENTIAL METHOD BLD: ABNORMAL
EOSINOPHIL # BLD AUTO: 0 K/UL (ref 0–0.5)
EOSINOPHIL NFR BLD: 0.3 % (ref 0–8)
ERYTHROCYTE [DISTWIDTH] IN BLOOD BY AUTOMATED COUNT: 15 % (ref 11.5–14.5)
EST. GFR  (NO RACE VARIABLE): >60 ML/MIN/1.73 M^2
GLUCOSE SERPL-MCNC: 101 MG/DL (ref 70–110)
GLUCOSE UR QL STRIP: NEGATIVE
HCO3 UR-SCNC: 19.9 MMOL/L (ref 24–28)
HCT VFR BLD AUTO: 29.5 % (ref 37–48.5)
HGB BLD-MCNC: 9.2 G/DL (ref 12–16)
HGB UR QL STRIP: ABNORMAL
HYALINE CASTS UR QL AUTO: 0 /LPF
IMM GRANULOCYTES # BLD AUTO: 0.08 K/UL (ref 0–0.04)
IMM GRANULOCYTES NFR BLD AUTO: 0.6 % (ref 0–0.5)
INFLUENZA A, MOLECULAR: NOT DETECTED
INFLUENZA B, MOLECULAR: NOT DETECTED
KETONES UR QL STRIP: NEGATIVE
LDH SERPL L TO P-CCNC: 1.52 MMOL/L (ref 0.5–2.2)
LDH SERPL L TO P-CCNC: 2.24 MMOL/L (ref 0.5–2.2)
LEUKOCYTE ESTERASE UR QL STRIP: ABNORMAL
LIPASE SERPL-CCNC: 16 U/L (ref 4–60)
LYMPHOCYTES # BLD AUTO: 0.5 K/UL (ref 1–4.8)
LYMPHOCYTES NFR BLD: 3.9 % (ref 18–48)
MCH RBC QN AUTO: 29.5 PG (ref 27–31)
MCHC RBC AUTO-ENTMCNC: 31.2 G/DL (ref 32–36)
MCV RBC AUTO: 95 FL (ref 82–98)
MICROSCOPIC COMMENT: ABNORMAL
MONOCYTES # BLD AUTO: 0.8 K/UL (ref 0.3–1)
MONOCYTES NFR BLD: 6.5 % (ref 4–15)
NEUTROPHILS # BLD AUTO: 11.2 K/UL (ref 1.8–7.7)
NEUTROPHILS NFR BLD: 88.5 % (ref 38–73)
NITRITE UR QL STRIP: NEGATIVE
NRBC BLD-RTO: 0 /100 WBC
OHS QRS DURATION: 78 MS
OHS QTC CALCULATION: 431 MS
PCO2 BLDA: 38.1 MMHG (ref 35–45)
PH SMN: 7.33 [PH] (ref 7.35–7.45)
PH UR STRIP: 7 [PH] (ref 5–8)
PLATELET # BLD AUTO: 159 K/UL (ref 150–450)
PMV BLD AUTO: 10.5 FL (ref 9.2–12.9)
PO2 BLDA: 35 MMHG (ref 40–60)
POC BE: -6 MMOL/L
POC SATURATED O2: 62 % (ref 95–100)
POC TCO2: 21 MMOL/L (ref 24–29)
POTASSIUM SERPL-SCNC: 3.8 MMOL/L (ref 3.5–5.1)
PROT SERPL-MCNC: 7 G/DL (ref 6–8.4)
PROT UR QL STRIP: ABNORMAL
RBC # BLD AUTO: 3.12 M/UL (ref 4–5.4)
RBC #/AREA URNS AUTO: 3 /HPF (ref 0–4)
RSV AG BY MOLECULAR METHOD: NOT DETECTED
SAMPLE: ABNORMAL
SAMPLE: ABNORMAL
SAMPLE: NORMAL
SARS-COV-2 RNA RESP QL NAA+PROBE: NOT DETECTED
SITE: ABNORMAL
SITE: ABNORMAL
SITE: NORMAL
SODIUM SERPL-SCNC: 139 MMOL/L (ref 136–145)
SP GR UR STRIP: 1.02 (ref 1–1.03)
SQUAMOUS #/AREA URNS AUTO: 4 /HPF
URN SPEC COLLECT METH UR: ABNORMAL
WBC # BLD AUTO: 12.67 K/UL (ref 3.9–12.7)
WBC #/AREA URNS AUTO: 87 /HPF (ref 0–5)

## 2024-04-06 PROCEDURE — 99291 CRITICAL CARE FIRST HOUR: CPT

## 2024-04-06 PROCEDURE — 80053 COMPREHEN METABOLIC PANEL: CPT | Performed by: STUDENT IN AN ORGANIZED HEALTH CARE EDUCATION/TRAINING PROGRAM

## 2024-04-06 PROCEDURE — 83605 ASSAY OF LACTIC ACID: CPT

## 2024-04-06 PROCEDURE — 87086 URINE CULTURE/COLONY COUNT: CPT | Performed by: STUDENT IN AN ORGANIZED HEALTH CARE EDUCATION/TRAINING PROGRAM

## 2024-04-06 PROCEDURE — 82803 BLOOD GASES ANY COMBINATION: CPT

## 2024-04-06 PROCEDURE — 96367 TX/PROPH/DG ADDL SEQ IV INF: CPT

## 2024-04-06 PROCEDURE — 87040 BLOOD CULTURE FOR BACTERIA: CPT | Mod: 59 | Performed by: STUDENT IN AN ORGANIZED HEALTH CARE EDUCATION/TRAINING PROGRAM

## 2024-04-06 PROCEDURE — 94761 N-INVAS EAR/PLS OXIMETRY MLT: CPT | Mod: XB

## 2024-04-06 PROCEDURE — 0241U SARS-COV2 (COVID) WITH FLU/RSV BY PCR: CPT | Performed by: HOSPITALIST

## 2024-04-06 PROCEDURE — 99900035 HC TECH TIME PER 15 MIN (STAT)

## 2024-04-06 PROCEDURE — 85025 COMPLETE CBC W/AUTO DIFF WBC: CPT | Performed by: STUDENT IN AN ORGANIZED HEALTH CARE EDUCATION/TRAINING PROGRAM

## 2024-04-06 PROCEDURE — 63600175 PHARM REV CODE 636 W HCPCS: Performed by: HOSPITALIST

## 2024-04-06 PROCEDURE — 20600001 HC STEP DOWN PRIVATE ROOM

## 2024-04-06 PROCEDURE — 93010 ELECTROCARDIOGRAM REPORT: CPT | Mod: ,,, | Performed by: INTERNAL MEDICINE

## 2024-04-06 PROCEDURE — 83690 ASSAY OF LIPASE: CPT | Performed by: STUDENT IN AN ORGANIZED HEALTH CARE EDUCATION/TRAINING PROGRAM

## 2024-04-06 PROCEDURE — 81001 URINALYSIS AUTO W/SCOPE: CPT | Performed by: STUDENT IN AN ORGANIZED HEALTH CARE EDUCATION/TRAINING PROGRAM

## 2024-04-06 PROCEDURE — 25000003 PHARM REV CODE 250: Performed by: STUDENT IN AN ORGANIZED HEALTH CARE EDUCATION/TRAINING PROGRAM

## 2024-04-06 PROCEDURE — 93005 ELECTROCARDIOGRAM TRACING: CPT

## 2024-04-06 PROCEDURE — 25000003 PHARM REV CODE 250: Performed by: HOSPITALIST

## 2024-04-06 PROCEDURE — 63600175 PHARM REV CODE 636 W HCPCS: Performed by: STUDENT IN AN ORGANIZED HEALTH CARE EDUCATION/TRAINING PROGRAM

## 2024-04-06 PROCEDURE — 96375 TX/PRO/DX INJ NEW DRUG ADDON: CPT

## 2024-04-06 PROCEDURE — 96365 THER/PROPH/DIAG IV INF INIT: CPT

## 2024-04-06 PROCEDURE — 25000003 PHARM REV CODE 250: Performed by: EMERGENCY MEDICINE

## 2024-04-06 RX ORDER — LOPERAMIDE HYDROCHLORIDE 2 MG/1
2 CAPSULE ORAL 4 TIMES DAILY PRN
Status: DISCONTINUED | OUTPATIENT
Start: 2024-04-06 | End: 2024-04-08 | Stop reason: HOSPADM

## 2024-04-06 RX ORDER — ONDANSETRON HYDROCHLORIDE 2 MG/ML
4 INJECTION, SOLUTION INTRAVENOUS
Status: COMPLETED | OUTPATIENT
Start: 2024-04-06 | End: 2024-04-06

## 2024-04-06 RX ORDER — SODIUM CHLORIDE 9 MG/ML
INJECTION, SOLUTION INTRAVENOUS CONTINUOUS
Status: ACTIVE | OUTPATIENT
Start: 2024-04-06 | End: 2024-04-06

## 2024-04-06 RX ORDER — TACROLIMUS 1 MG/1
4 CAPSULE ORAL 2 TIMES DAILY
Status: DISCONTINUED | OUTPATIENT
Start: 2024-04-06 | End: 2024-04-08 | Stop reason: HOSPADM

## 2024-04-06 RX ORDER — CALCIUM CARBONATE 200(500)MG
500 TABLET,CHEWABLE ORAL 3 TIMES DAILY PRN
Status: DISCONTINUED | OUTPATIENT
Start: 2024-04-06 | End: 2024-04-08 | Stop reason: HOSPADM

## 2024-04-06 RX ORDER — PREDNISONE 5 MG/1
5 TABLET ORAL DAILY
Status: DISCONTINUED | OUTPATIENT
Start: 2024-04-06 | End: 2024-04-08 | Stop reason: HOSPADM

## 2024-04-06 RX ORDER — TALC
6 POWDER (GRAM) TOPICAL NIGHTLY PRN
Status: DISCONTINUED | OUTPATIENT
Start: 2024-04-06 | End: 2024-04-08 | Stop reason: HOSPADM

## 2024-04-06 RX ORDER — ACETAMINOPHEN 325 MG/1
650 TABLET ORAL EVERY 6 HOURS PRN
Status: DISCONTINUED | OUTPATIENT
Start: 2024-04-06 | End: 2024-04-08 | Stop reason: HOSPADM

## 2024-04-06 RX ORDER — ACETAMINOPHEN 325 MG/1
650 TABLET ORAL
Status: COMPLETED | OUTPATIENT
Start: 2024-04-06 | End: 2024-04-06

## 2024-04-06 RX ORDER — BISACODYL 5 MG
5 TABLET, DELAYED RELEASE (ENTERIC COATED) ORAL DAILY PRN
Status: DISCONTINUED | OUTPATIENT
Start: 2024-04-06 | End: 2024-04-08 | Stop reason: HOSPADM

## 2024-04-06 RX ORDER — DIPHENHYDRAMINE HCL 25 MG
25 CAPSULE ORAL EVERY 6 HOURS PRN
Status: DISCONTINUED | OUTPATIENT
Start: 2024-04-06 | End: 2024-04-08 | Stop reason: HOSPADM

## 2024-04-06 RX ORDER — SODIUM BICARBONATE 650 MG/1
1300 TABLET ORAL 3 TIMES DAILY
Status: DISCONTINUED | OUTPATIENT
Start: 2024-04-06 | End: 2024-04-08 | Stop reason: HOSPADM

## 2024-04-06 RX ORDER — PROMETHAZINE HYDROCHLORIDE 12.5 MG/1
12.5 TABLET ORAL EVERY 6 HOURS PRN
Status: DISCONTINUED | OUTPATIENT
Start: 2024-04-06 | End: 2024-04-08 | Stop reason: HOSPADM

## 2024-04-06 RX ADMIN — VANCOMYCIN HYDROCHLORIDE 1500 MG: 1.5 INJECTION, POWDER, LYOPHILIZED, FOR SOLUTION INTRAVENOUS at 06:04

## 2024-04-06 RX ADMIN — ACETAMINOPHEN 650 MG: 325 TABLET ORAL at 07:04

## 2024-04-06 RX ADMIN — TACROLIMUS 4 MG: 1 CAPSULE ORAL at 09:04

## 2024-04-06 RX ADMIN — SODIUM BICARBONATE 650 MG TABLET 1300 MG: at 08:04

## 2024-04-06 RX ADMIN — SODIUM BICARBONATE 650 MG TABLET 1300 MG: at 02:04

## 2024-04-06 RX ADMIN — SODIUM CHLORIDE, POTASSIUM CHLORIDE, SODIUM LACTATE AND CALCIUM CHLORIDE 1000 ML: 600; 310; 30; 20 INJECTION, SOLUTION INTRAVENOUS at 05:04

## 2024-04-06 RX ADMIN — ONDANSETRON 4 MG: 2 INJECTION INTRAMUSCULAR; INTRAVENOUS at 06:04

## 2024-04-06 RX ADMIN — VANCOMYCIN HYDROCHLORIDE 1000 MG: 1 INJECTION, POWDER, LYOPHILIZED, FOR SOLUTION INTRAVENOUS at 08:04

## 2024-04-06 RX ADMIN — PIPERACILLIN SODIUM AND TAZOBACTAM SODIUM 4.5 G: 4; .5 INJECTION, POWDER, FOR SOLUTION INTRAVENOUS at 05:04

## 2024-04-06 RX ADMIN — TACROLIMUS 4 MG: 1 CAPSULE ORAL at 06:04

## 2024-04-06 RX ADMIN — SODIUM CHLORIDE: 9 INJECTION, SOLUTION INTRAVENOUS at 02:04

## 2024-04-06 RX ADMIN — SODIUM BICARBONATE 650 MG TABLET 1300 MG: at 09:04

## 2024-04-06 RX ADMIN — ACETAMINOPHEN 650 MG: 325 TABLET ORAL at 02:04

## 2024-04-06 RX ADMIN — PREDNISONE 5 MG: 5 TABLET ORAL at 09:04

## 2024-04-06 NOTE — NURSING
Nurses Note -- 4 Eyes      4/6/2024   2:17 PM      Skin assessed during: Admit      [x] No Altered Skin Integrity Present    []Prevention Measures Documented      [] Yes- Altered Skin Integrity Present or Discovered   [] LDA Added if Not in Epic (Describe Wound)   [] New Altered Skin Integrity was Present on Admit and Documented in LDA   [] Wound Image Taken    Wound Care Consulted? No    Attending Nurse:  Bronwyn Rosales RN    Second RN/Staff Member:   Zaira Cedeño RN

## 2024-04-06 NOTE — PLAN OF CARE
Problem: Adult Inpatient Plan of Care  Goal: Plan of Care Review  Outcome: Ongoing, Progressing  Goal: Patient-Specific Goal (Individualized)  Outcome: Ongoing, Progressing  Goal: Absence of Hospital-Acquired Illness or Injury  Outcome: Ongoing, Progressing  Goal: Optimal Comfort and Wellbeing  Outcome: Ongoing, Progressing  Goal: Readiness for Transition of Care  Outcome: Ongoing, Progressing     Problem: Diabetes Comorbidity  Goal: Blood Glucose Level Within Targeted Range  Outcome: Ongoing, Progressing     Problem: Adjustment to Illness (Sepsis/Septic Shock)  Goal: Optimal Coping  Outcome: Ongoing, Progressing     Problem: Bleeding (Sepsis/Septic Shock)  Goal: Absence of Bleeding  Outcome: Ongoing, Progressing     Problem: Glycemic Control Impaired (Sepsis/Septic Shock)  Goal: Blood Glucose Level Within Desired Range  Outcome: Ongoing, Progressing     Problem: Infection Progression (Sepsis/Septic Shock)  Goal: Absence of Infection Signs and Symptoms  Outcome: Ongoing, Progressing     Problem: Nutrition Impaired (Sepsis/Septic Shock)  Goal: Optimal Nutrition Intake  Outcome: Ongoing, Progressing   Pt is currently laying in bed resting quietly resp even and unlabored no distress noted at this time pt took meds whole with water tolerated well febrile temp 101.0  MD aware tylenol given per order safety precautions in place.

## 2024-04-06 NOTE — ASSESSMENT & PLAN NOTE
Nausea & vomiting   Symptoms suggest UTI. Give IV fluids. Obtain urinalysis when able to confirm diagnosis. Give ceftriaxone and vancomycin for now.

## 2024-04-06 NOTE — CLINICAL REVIEW
IP Sepsis Screen (most recent)       Sepsis Screen (IP) - 04/06/24 0804       Is the patient's history or complaint suggestive of a possible infection? Yes  -CB    Are there at least two of the following signs and symptoms present? Yes  -CB    Sepsis signs/symptoms - Tachycardia Tachycardia     >90  -CB    Sepsis signs/symptoms - WBC WBC < 4,000 or WBC > 12,000  -CB    Are any of the following organ dysfunction criteria present and not considered to be due to a chronic condition? Yes  -CB    Organ Dysfunction Criteria Lactate > 2.0  -CB    Initiate Sepsis Protocol No  -CB    Reason sepsis not considered Pt. receiving appropriate management  -CB              User Key  (r) = Recorded By, (t) = Taken By, (c) = Cosigned By      Initials Name    CB Marietta Rodas, RN

## 2024-04-06 NOTE — ED TRIAGE NOTES
Isabela Read, a 29 y.o. female presents to the ED w/ complaint of fever. Patient states she started having fever nausea and vomiting yesterday. Patient denies C/P,SOB. Patient had kidney/pancreas transplant in November of 2022. Patient compliant with medications    Triage note:  Chief Complaint   Patient presents with    Fever     Hx kidney/pancreas transplant started w/ n/v/fever yesterday      Review of patient's allergies indicates:  No Known Allergies  Past Medical History:   Diagnosis Date    Acute cystitis without hematuria 11/25/2022    Acute kidney injury superimposed on chronic kidney disease 4/7/2021    Anemia     Anemia in ESRD (end-stage renal disease) 7/29/2020    Lab Results  Component Value Date   WBC 7.23 07/29/2020   HGB 7.1 (L) 07/29/2020   HCT 22.2 (L) 07/29/2020   MCV 91 07/29/2020    (H) 07/29/2020   Following with hematology and scheduled to undergo iron infusions    Bacteremia due to Escherichia coli 1/23/2023    Bilious vomiting with nausea 12/12/2022    Chronic hypertension with exacerbation during pregnancy in second trimester 11/6/2020    Current regimen (11/6/20):  - Carvedilol 12.5 mg BID - Nifedipine 30 mg daily Baseline CKD + proteinuria    Chronic kidney disease     Depression     Diabetes mellitus     Diabetic retinopathy     Diarrhea     Encounter for blood transfusion     End stage renal failure on dialysis     Fever 12/1/2022    Fever of undetermined origin 12/12/2022    Gastroparesis     Glaucoma     Hepatomegaly 4/29/2021    History of diabetes mellitus, type I 12/20/2022    Hx of psychiatric care     Hyperlipidemia     Hypertension     Nausea and vomiting 12/12/2022    Nephrotic syndrome     Nephrotic syndrome 3/4/2021    Nonspecific reaction to tuberculin skin test without active tuberculosis 10/1/2021    Orthostatic hypotension 1/25/2023    Palpitations     Poor fetal growth affecting management of mother in second trimester 10/15/2020    Pyelonephritis,  acute 11/26/2022    Restrictive lung disease     Retinal detachment     Sepsis 12/1/2022    Sepsis 11/25/2022    Sepsis due to Escherichia coli 1/23/2023    Severe pre-eclampsia in second trimester 11/6/2020    Severe sepsis 11/25/2022    SIRS (systemic inflammatory response syndrome) 12/6/2022    Status post pancreas transplantation 11/26/2022 11/2/2022    Type 1 diabetes mellitus with end-stage renal disease (ESRD) 2/24/2015    Followed by Dr. Mayo.  Last A1C was 13  Currently on lantus 20 units qAM and humolog 10 units with meals  - a fetal echocardiogram around 22-24 weeks - needs eye exam, podiatry exam  - needs EKG, maternal echo and fu with cardiology  - ASA 81mg, folic acid 4mg PO daily - hemoglobin A1c every 4-6 weeks -24 hour urine for protein and creatinine clearance should be performed. Patient will also need a

## 2024-04-06 NOTE — HOSPITAL COURSE
Qualitative PCR test for COVID-19, influenza, and RSV was negative. She was put on vancomycin and ceftriaxone. Urinalysis showed 1+ protein and 87 WBC/hpf. Labs showed low iron, TIBC, and transferrin with elevated ferritin. Vitamin B12 was relatively low. Urine culture had no growth but she improved on antibiotics. She was prescribed 3 days of amoxicillin-clavulanate. She was prescribed cyanocobalamin.

## 2024-04-06 NOTE — NURSING
Pt arrived from the ED via stretcher by transport she is aaox4 resp even and unlabored no distress noted at this time she ambulated to the restroom without difficulty with her non skid socks she has family present at the bedside no skin issues noted at this time she does have a temp of 101.0 notified the MD gave verbal order for tylenol 650 mg PO awaiting for him to put order in to administer. She is currently on tele box running sinus tach no s/s of discomfort or distress at this time bed in lowest position call light within each reach.

## 2024-04-06 NOTE — ED NOTES
Patient identifiers for Isabela Read 29 y.o. female checked and correct.  Chief Complaint   Patient presents with    Fever     Hx kidney/pancreas transplant started w/ n/v/fever yesterday      Past Medical History:   Diagnosis Date    Acute cystitis without hematuria 11/25/2022    Acute kidney injury superimposed on chronic kidney disease 4/7/2021    Anemia     Anemia in ESRD (end-stage renal disease) 7/29/2020    Lab Results  Component Value Date   WBC 7.23 07/29/2020   HGB 7.1 (L) 07/29/2020   HCT 22.2 (L) 07/29/2020   MCV 91 07/29/2020    (H) 07/29/2020   Following with hematology and scheduled to undergo iron infusions    Bacteremia due to Escherichia coli 1/23/2023    Bilious vomiting with nausea 12/12/2022    Chronic hypertension with exacerbation during pregnancy in second trimester 11/6/2020    Current regimen (11/6/20):  - Carvedilol 12.5 mg BID - Nifedipine 30 mg daily Baseline CKD + proteinuria    Chronic kidney disease     Depression     Diabetes mellitus     Diabetic retinopathy     Diarrhea     Encounter for blood transfusion     End stage renal failure on dialysis     Fever 12/1/2022    Fever of undetermined origin 12/12/2022    Gastroparesis     Glaucoma     Hepatomegaly 4/29/2021    History of diabetes mellitus, type I 12/20/2022    Hx of psychiatric care     Hyperlipidemia     Hypertension     Nausea and vomiting 12/12/2022    Nephrotic syndrome     Nephrotic syndrome 3/4/2021    Nonspecific reaction to tuberculin skin test without active tuberculosis 10/1/2021    Orthostatic hypotension 1/25/2023    Palpitations     Poor fetal growth affecting management of mother in second trimester 10/15/2020    Pyelonephritis, acute 11/26/2022    Restrictive lung disease     Retinal detachment     Sepsis 12/1/2022    Sepsis 11/25/2022    Sepsis due to Escherichia coli 1/23/2023    Severe pre-eclampsia in second trimester 11/6/2020    Severe sepsis 11/25/2022    SIRS (systemic inflammatory  response syndrome) 12/6/2022    Status post pancreas transplantation 11/26/2022 11/2/2022    Type 1 diabetes mellitus with end-stage renal disease (ESRD) 2/24/2015    Followed by Dr. Mayo.  Last A1C was 13  Currently on lantus 20 units qAM and humolog 10 units with meals  - a fetal echocardiogram around 22-24 weeks - needs eye exam, podiatry exam  - needs EKG, maternal echo and fu with cardiology  - ASA 81mg, folic acid 4mg PO daily - hemoglobin A1c every 4-6 weeks -24 hour urine for protein and creatinine clearance should be performed. Patient will also need a     Allergies reported: Review of patient's allergies indicates:  No Known Allergies      LOC: Patient is awake, alert, and aware of environment with an appropriate affect. Patient is oriented x 4 and speaking appropriately.  APPEARANCE: Patient resting comfortably and in no acute distress. Patient is clean and well groomed, patient's clothing is properly fastened.  SKIN: The skin is warm and dry. Patient has normal skin turgor and moist mucus membranes.   MUSKULOSKELETAL: Patient is moving all extremities well, no obvious deformities noted. Pulses intact.   RESPIRATORY: Airway is open and patent. Respirations are spontaneous and non-labored with normal effort and rate.  CARDIAC: Patient has a normal rate and rhythm. Sinus tachy on cardiac monitor. No peripheral edema noted.   ABDOMEN: No distention noted. Soft and non-tender upon palpation.  NEUROLOGICAL:  PERRL. Facial expression is symmetrical. Hand grasps are equal bilaterally. Normal sensation in all extremities when touched with finger.

## 2024-04-06 NOTE — ASSESSMENT & PLAN NOTE
Long-term use of immunosuppressant medication   Long term systemic steroid user   Continue home tacrolimus and prednisone. Hold home mycophenolate due to active infection.

## 2024-04-06 NOTE — NURSING
Pt had vancomycin to be administered, infusion started but iv infiltrated she need a new iv notified md states ok she has another antibiotic for what she has.

## 2024-04-06 NOTE — H&P
[facility] Medicine  History & Physical    Patient Name: Isabela Read  MRN: [id number]  Patient Class: IP- Inpatient  Admission Date: 2024  Attending Physician: Adam Saunders MD   Primary Care Provider: Tavo Koo MD         Patient information was obtained from patient, past medical records, and ER records.     Subjective:     Principal Problem:Sepsis    Chief Complaint:   Chief Complaint   Patient presents with    Fever     Hx kidney/pancreas transplant started w/ n/v/fever yesterday         HPI: Isabela Read is a 29 year old Black woman with history of diabetes mellitus type 1 with retinopathy, nephrotic syndrome, gastroparesis, and Charcot arthropathy, history of  donor pancreas and kidney transplant on 2022 (immunosuppressed on tacrolimus, mycophenolate, and prednisone), metabolic acidosis, heart failure with preserved ejection fraction, interstitial pulmonary disease, history of E coli bacteremia on 2023. She lives in [facility]. Her primary care physician is Dr. Tavo Koo.    She presented to [facility] on 2024 with fever, nausea, and vomiting that started the day before. She also had urinary frequency and burning with urination that occurred at the same time but improved.    In the emergency department, temperature was as high as 100.1° Fahrenheit, heart rate was 118, blood pressures were normal. Labs showed WBC 60206/uL, hemoglobin and hematocrit 9.2 g/dL and 29.5% (decreased from 12.1 and 39.9 on 2024), bicarbonate 16 mmol/L (stable from 15 on 2024). X-ray showed no acute findings. Urine was unable to be obtained for urinalysis. She was given acetaminophen, 1 liter of lactated Ringer's solution, ondansetron, piperacillin-tazobactam, vancomycin. She was admitted to Hospital Medicine Team R.    Past Medical History:   Diagnosis Date    Acute cystitis without hematuria  11/25/2022    Acute kidney injury superimposed on chronic kidney disease 4/7/2021    Anemia     Anemia in ESRD (end-stage renal disease) 7/29/2020    Lab Results  Component Value Date   WBC 7.23 07/29/2020   HGB 7.1 (L) 07/29/2020   HCT 22.2 (L) 07/29/2020   MCV 91 07/29/2020    (H) 07/29/2020   Following with hematology and scheduled to undergo iron infusions    Bacteremia due to Escherichia coli 1/23/2023    Bilious vomiting with nausea 12/12/2022    Chronic hypertension with exacerbation during pregnancy in second trimester 11/6/2020    Current regimen (11/6/20):  - Carvedilol 12.5 mg BID - Nifedipine 30 mg daily Baseline CKD + proteinuria    Chronic kidney disease     Depression     Diabetes mellitus     Diabetic retinopathy     Diarrhea     Encounter for blood transfusion     End stage renal failure on dialysis     Fever 12/1/2022    Fever of undetermined origin 12/12/2022    Gastroparesis     Glaucoma     Hepatomegaly 4/29/2021    History of diabetes mellitus, type I 12/20/2022    Hx of psychiatric care     Hyperlipidemia     Hypertension     Nausea and vomiting 12/12/2022    Nephrotic syndrome     Nephrotic syndrome 3/4/2021    Nonspecific reaction to tuberculin skin test without active tuberculosis 10/1/2021    Orthostatic hypotension 1/25/2023    Palpitations     Poor fetal growth affecting management of mother in second trimester 10/15/2020    Pyelonephritis, acute 11/26/2022    Restrictive lung disease     Retinal detachment     Sepsis 12/1/2022    Sepsis 11/25/2022    Sepsis due to Escherichia coli 1/23/2023    Severe pre-eclampsia in second trimester 11/6/2020    Severe sepsis 11/25/2022    SIRS (systemic inflammatory response syndrome) 12/6/2022    Status post pancreas transplantation 11/26/2022 11/2/2022    Type 1 diabetes mellitus with end-stage renal disease (ESRD) 2/24/2015    Followed by Dr. Mayo.  Last A1C was 13  Currently on lantus 20 units qAM and humolog 10 units with meals  - a  fetal echocardiogram around 22-24 weeks - needs eye exam, podiatry exam  - needs EKG, maternal echo and fu with cardiology  - ASA 81mg, folic acid 4mg PO daily - hemoglobin A1c every 4-6 weeks -24 hour urine for protein and creatinine clearance should be performed. Patient will also need a       Past Surgical History:   Procedure Laterality Date    AV FISTULA PLACEMENT Left 04/07/2021    Procedure: CREATION, AV FISTULA;  Surgeon: Roberto Ryan MD;  Location: [facility] OR [address] [place] [address];  Service: Peripheral Vascular;  Laterality: Left;    COLONOSCOPY N/A 03/16/2022    Procedure: COLONOSCOPY;  Surgeon: Tavo Kwok MD;  Location: [facility] ENDO (4TH FLR);  Service: Endoscopy;  Laterality: N/A;  Questionable history of delayed gastric emptying longstanding diabetes now on eating pre transplant workup for history of nausea vomiting which seems to have improved with dialysis also chronic diarrhea and history of anemia pre transplant workup for kidney transplant. 3    CYSTOSCOPY      ESOPHAGOGASTRODUODENOSCOPY N/A 03/16/2022    Procedure: EGD (ESOPHAGOGASTRODUODENOSCOPY);  Surgeon: Tavo Kwok MD;  Location: [facility] ENDO (4TH FLR);  Service: Endoscopy;  Laterality: N/A;  Questionable history of delayed gastric emptying longstanding diabetes now on eating pre transplant workup for history of nausea vomiting which seems to have improved with dialysis also chronic diarrhea and history of anemia pre transplant workup for    FISTULOGRAM N/A 08/11/2021    Procedure: Fistulogram;  Surgeon: Roberto Ryan MD;  Location: [facility] CATH LAB;  Service: Cardiology;  Laterality: N/A;    KIDNEY TRANSPLANT Right 11/02/2022    Procedure: TRANSPLANT, KIDNEY;  Surgeon: Kumar Rodriegs Jr., MD;  Location: [facility] OR [address] [place] [address];  Service: Transplant;  Laterality: Right;    LASER PHOTOCOAGULATION OF RETINA Right 05/31/2022    Procedure: PHOTOCOAGULATION, RETINA, USING LASER;  Surgeon: Maximilian Montaño MD;  Location: [facility] OR [address] [place] [address];   Service: Ophthalmology;  Laterality: Right;    PERCUTANEOUS TRANSLUMINAL ANGIOPLASTY OF ARTERIOVENOUS FISTULA N/A 08/11/2021    Procedure: PTA, AV FISTULA;  Surgeon: Roberto Ryan MD;  Location: [facility] CATH LAB;  Service: Cardiology;  Laterality: N/A;    REMOVAL IMPLANT, POSTERIOR SEGMENT, INTRAOCULAR Right 02/01/2022    Procedure: REMOVAL IMPLANT, POSTERIOR SEGMENT, INTRAOCULAR;  Surgeon: Maximilian Montaño MD;  Location: [facility] OR 1ST FLR;  Service: Ophthalmology;  Laterality: Right;    REPAIR OF RETINAL DETACHMENT WITH VITRECTOMY Right 01/25/2022    Procedure: REPAIR, RETINAL DETACHMENT, WITH VITRECTOMY, MEMBRANE PEEL, LASER, INJECTION OF GAS VS OIL;  Surgeon: Maximilian Montaño MD;  Location: [facility] OR 1ST FLR;  Service: Ophthalmology;  Laterality: Right;    REPAIR, RETINAL DETACHMENT, COMPLEX, WITH VITRECTOMY AND MEMBRANE PEELING Right 3/21/2023    Procedure: REPAIR,RETINAL DETACHMENT,COMPLEX,WITH VITRECTOMY AND MEMBRANE PEELING;  Surgeon: Maximilian Montaño MD;  [facility];  Service: Ophthalmology;  Laterality: Right;  25ga    REVISION OF ARTERIOVENOUS FISTULA Left 12/10/2021    Procedure: REVISION, AV FISTULA with BVT;  Surgeon: Roberto Ryan MD;  Location: [facility] OR 2ND FLR;  Service: Peripheral Vascular;  Laterality: Left;    TRANSPLANTATION OF PANCREAS N/A 11/02/2022    Procedure: TRANSPLANT, PANCREAS;  Surgeon: Kumar Rodriges Jr., MD;  Location: [facility] OR 2ND FLR;  Service: Transplant;  Laterality: N/A;    VITRECTOMY BY PARS PLANA APPROACH Right 02/01/2022    Procedure: VITRECTOMY, PARS PLANA APPROACH;  Surgeon: Maximilian Montaño MD;  Location: [facility] OR 1ST FLR;  Service: Ophthalmology;  Laterality: Right;    VITRECTOMY BY PARS PLANA APPROACH Right 05/31/2022    Procedure: VITRECTOMY, PARS PLANA APPROACH;  Surgeon: Maximilian Montaño MD;  Location: [facility] OR 1ST FLR;  Service: Ophthalmology;  Laterality: Right;       Review of patient's allergies indicates:  No Known  Allergies    No current facility-administered medications on file prior to encounter.     Current Outpatient Medications on File Prior to Encounter   Medication Sig    ketoconazole (NIZORAL) 2 % cream Apply topically 2 (two) times daily as needed for dry areas of face    medroxyPROGESTERone (DEPO-PROVERA) 150 mg/mL Syrg Inject 1 mL (150 mg total) into the muscle every 3 (three) months.    mycophenolate (CELLCEPT) 250 mg Cap Take 3 capsules (750 mg total) by mouth 2 (two) times daily.    predniSONE (DELTASONE) 5 MG tablet Take 1 tablet (5 mg total) by mouth once daily.    promethazine (PHENERGAN) 12.5 MG Tab Take 1 tablet (12.5 mg total) by mouth every 6 (six) hours as needed (nausea).    sodium bicarbonate 650 MG tablet Take 2 tablets (1,300 mg total) by mouth 3 (three) times daily.    tacrolimus (PROGRAF) 1 MG Cap Take 4 capsules (4 mg total) by mouth every 12 (twelve) hours.    tretinoin (RETIN-A) 0.05 % cream Apply topically every evening. Start with every other night and move up to nightly after 2 weeks if not too dry.    tretinoin (RETIN-A) 0.1 % cream Apply topically every evening. Increase Retin A to 0.1%.  Rotate this with old one until weaker strength runs out and then go nightly with 0.1%.     Family History       Problem Relation (Age of Onset)    Diabetes Brother    Heart disease Father    Hypertension Mother          Tobacco Use    Smoking status: Never    Smokeless tobacco: Never   Substance and Sexual Activity    Alcohol use: No    Drug use: No    Sexual activity: Not Currently     Partners: Male     Comment: monogamous     Review of Systems   Constitutional:  Positive for fever. Negative for diaphoresis.   HENT:  Negative for trouble swallowing and voice change.    Eyes:  Negative for pain and redness.   Respiratory:  Negative for cough and shortness of breath.    Cardiovascular:  Negative for chest pain and palpitations.   Gastrointestinal:  Positive for nausea and vomiting.   Genitourinary:   Positive for dysuria and frequency.   Musculoskeletal:  Negative for neck pain and neck stiffness.   Skin:  Negative for rash and wound.   Neurological:  Negative for seizures and syncope.     Objective:     Vital Signs (Most Recent):  Temp: (!) 101 °F (38.3 °C) (04/06/24 1437)  Pulse: (!) 120 (04/06/24 1522)  Resp: 18 (04/06/24 1348)  BP: (!) 119/55 (04/06/24 1348)  SpO2: 100 % (04/06/24 1348) Vital Signs (24h Range):  Temp:  [98.4 °F (36.9 °C)-101 °F (38.3 °C)] 101 °F (38.3 °C)  Pulse:  [107-120] 120  Resp:  [11-20] 18  SpO2:  [97 %-100 %] 100 %  BP: (105-137)/(55-75) 119/55     Weight: 74.4 kg (164 lb)  Body mass index is 28.15 kg/m².     Physical Exam  Vitals and nursing note reviewed.   Constitutional:       General: She is not in acute distress.     Appearance: She is well-developed. She is not toxic-appearing or diaphoretic.   HENT:      Head: Normocephalic and atraumatic.   Eyes:      General: No scleral icterus.     Conjunctiva/sclera: Conjunctivae normal.   Cardiovascular:      Rate and Rhythm: Regular rhythm. Tachycardia present.      Heart sounds: Normal heart sounds. No murmur heard.  Pulmonary:      Effort: No respiratory distress.      Breath sounds: Normal breath sounds. No wheezing or rales.   Abdominal:      General: There is no distension.      Palpations: Abdomen is soft.      Tenderness: There is no abdominal tenderness. There is no guarding.   Skin:     General: Skin is warm and dry.      Coloration: Skin is not jaundiced or pale.   Neurological:      Mental Status: She is alert and oriented to person, place, and time.      Motor: No tremor or seizure activity.   Psychiatric:         Attention and Perception: Attention normal.         Mood and Affect: Mood and affect normal.         Behavior: Behavior is cooperative.                Significant Labs: All pertinent labs within the past 24 hours have been reviewed.    Significant Imaging: I have reviewed all pertinent imaging results/findings  within the past 24 hours.  X-Ray Chest AP Portable 24: FINDINGS:   No consolidation, pleural effusion or pneumothorax.   Cardiomediastinal silhouette is unremarkable.   Impression:  No acute findings in the chest.   Assessment/Plan:     * Sepsis  Nausea & vomiting   Symptoms suggest UTI. Give IV fluids. Obtain urinalysis when able to confirm diagnosis. Give ceftriaxone and vancomycin for now.    Normocytic anemia  New. Check iron studies, folate, vitamin B12.    Interstitial pulmonary disease, unspecified  No shortness of breath or hypoxia. Stable.      Status post -donor simultaneous pancreas kidney transplantation  Long-term use of immunosuppressant medication   Long term systemic steroid user   Continue home tacrolimus and prednisone. Hold home mycophenolate due to active infection.     (HFpEF) heart failure with preserved ejection fraction  Stable.       VTE Risk Mitigation (From admission, onward)      None                            Adam Saunders MD  Department of Hospital Medicine  [facility] - Telemetry Stepdown

## 2024-04-06 NOTE — ED PROVIDER NOTES
Encounter Date: 4/6/2024       History     Chief Complaint   Patient presents with    Fever     Hx kidney/pancreas transplant started w/ n/v/fever yesterday      29 y.o. female with kidney transplant (2022) DM, HLD, HTN, history of gastroparesis presents for fever and chills for the past day.  Patient reports fever/chills as well as nausea and vomiting she reports history of similar symptoms in the past with severe infections or.  She denies any significant abdominal pain.  She denies any dysuria but does have urinary frequency.  She denies cough, runny nose, congestion, shortness of breath, chest pain    The history is provided by the patient and medical records.     Review of patient's allergies indicates:  No Known Allergies  Past Medical History:   Diagnosis Date    Acute cystitis without hematuria 11/25/2022    Acute kidney injury superimposed on chronic kidney disease 4/7/2021    Anemia     Anemia in ESRD (end-stage renal disease) 7/29/2020    Lab Results  Component Value Date   WBC 7.23 07/29/2020   HGB 7.1 (L) 07/29/2020   HCT 22.2 (L) 07/29/2020   MCV 91 07/29/2020    (H) 07/29/2020   Following with hematology and scheduled to undergo iron infusions    Bacteremia due to Escherichia coli 1/23/2023    Bilious vomiting with nausea 12/12/2022    Chronic hypertension with exacerbation during pregnancy in second trimester 11/6/2020    Current regimen (11/6/20):  - Carvedilol 12.5 mg BID - Nifedipine 30 mg daily Baseline CKD + proteinuria    Chronic kidney disease     Depression     Diabetes mellitus     Diabetic retinopathy     Diarrhea     Encounter for blood transfusion     End stage renal failure on dialysis     Fever 12/1/2022    Fever of undetermined origin 12/12/2022    Gastroparesis     Glaucoma     Hepatomegaly 4/29/2021    History of diabetes mellitus, type I 12/20/2022    Hx of psychiatric care     Hyperlipidemia     Hypertension     Nausea and vomiting 12/12/2022    Nephrotic syndrome      Nephrotic syndrome 3/4/2021    Nonspecific reaction to tuberculin skin test without active tuberculosis 10/1/2021    Orthostatic hypotension 1/25/2023    Palpitations     Poor fetal growth affecting management of mother in second trimester 10/15/2020    Pyelonephritis, acute 11/26/2022    Restrictive lung disease     Retinal detachment     Sepsis 12/1/2022    Sepsis 11/25/2022    Sepsis due to Escherichia coli 1/23/2023    Severe pre-eclampsia in second trimester 11/6/2020    Severe sepsis 11/25/2022    SIRS (systemic inflammatory response syndrome) 12/6/2022    Status post pancreas transplantation 11/26/2022 11/2/2022    Type 1 diabetes mellitus with end-stage renal disease (ESRD) 2/24/2015    Followed by Dr. Mayo.  Last A1C was 13  Currently on lantus 20 units qAM and humolog 10 units with meals  - a fetal echocardiogram around 22-24 weeks - needs eye exam, podiatry exam  - needs EKG, maternal echo and fu with cardiology  - ASA 81mg, folic acid 4mg PO daily - hemoglobin A1c every 4-6 weeks -24 hour urine for protein and creatinine clearance should be performed. Patient will also need a     Past Surgical History:   Procedure Laterality Date    AV FISTULA PLACEMENT Left 04/07/2021    Procedure: CREATION, AV FISTULA;  Surgeon: Roberto Ryan MD;  Location: [facility] OR [address] [place] [address];  Service: Peripheral Vascular;  Laterality: Left;    COLONOSCOPY N/A 03/16/2022    Procedure: COLONOSCOPY;  Surgeon: Tavo Kwok MD;  [facility]);  Service: Endoscopy;  Laterality: N/A;  Questionable history of delayed gastric emptying longstanding diabetes now on eating pre transplant workup for history of nausea vomiting which seems to have improved with dialysis also chronic diarrhea and history of anemia pre transplant workup for kidney transplant. 3    CYSTOSCOPY      ESOPHAGOGASTRODUODENOSCOPY N/A 03/16/2022    Procedure: EGD (ESOPHAGOGASTRODUODENOSCOPY);  Surgeon: Tavo Kwok MD;  Location: [facility]  ENDO (4TH FLR);  Service: Endoscopy;  Laterality: N/A;  Questionable history of delayed gastric emptying longstanding diabetes now on eating pre transplant workup for history of nausea vomiting which seems to have improved with dialysis also chronic diarrhea and history of anemia pre transplant workup for    FISTULOGRAM N/A 08/11/2021    Procedure: Fistulogram;  Surgeon: Roberto Ryan MD;  Location: [facility] CATH LAB;  Service: Cardiology;  Laterality: N/A;    KIDNEY TRANSPLANT Right 11/02/2022    Procedure: TRANSPLANT, KIDNEY;  Surgeon: Kumar Rodriges Jr., MD;  Location: [facility] OR 2ND FLR;  Service: Transplant;  Laterality: Right;    LASER PHOTOCOAGULATION OF RETINA Right 05/31/2022    Procedure: PHOTOCOAGULATION, RETINA, USING LASER;  Surgeon: Maximilian Montaño MD;  Location: [facility] OR 1ST FLR;  Service: Ophthalmology;  Laterality: Right;    PERCUTANEOUS TRANSLUMINAL ANGIOPLASTY OF ARTERIOVENOUS FISTULA N/A 08/11/2021    Procedure: PTA, AV FISTULA;  Surgeon: Roberto Ryan MD;  Location: [facility] CATH LAB;  Service: Cardiology;  Laterality: N/A;    REMOVAL IMPLANT, POSTERIOR SEGMENT, INTRAOCULAR Right 02/01/2022    Procedure: REMOVAL IMPLANT, POSTERIOR SEGMENT, INTRAOCULAR;  Surgeon: Maximilian Montaño MD;  Location: [facility] OR 1ST FLR;  Service: Ophthalmology;  Laterality: Right;    REPAIR OF RETINAL DETACHMENT WITH VITRECTOMY Right 01/25/2022    Procedure: REPAIR, RETINAL DETACHMENT, WITH VITRECTOMY, MEMBRANE PEEL, LASER, INJECTION OF GAS VS OIL;  Surgeon: Maximilian Montaño MD;  Location: [facility] OR 1ST FLR;  Service: Ophthalmology;  Laterality: Right;    REPAIR, RETINAL DETACHMENT, COMPLEX, WITH VITRECTOMY AND MEMBRANE PEELING Right 3/21/2023    Procedure: REPAIR,RETINAL DETACHMENT,COMPLEX,WITH VITRECTOMY AND MEMBRANE PEELING;  Surgeon: Maximilian Montaño MD;  Location: [facility] OR 1ST FLR;  Service: Ophthalmology;  Laterality: Right;  25ga    REVISION OF ARTERIOVENOUS FISTULA Left 12/10/2021    Procedure:  REVISION, AV FISTULA with BVT;  Surgeon: Roberto Ryan MD;  Location: [facility] OR 2ND FLR;  Service: Peripheral Vascular;  Laterality: Left;    TRANSPLANTATION OF PANCREAS N/A 11/02/2022    Procedure: TRANSPLANT, PANCREAS;  Surgeon: Kumar Rodriges Jr., MD;  Location: [facility] OR 2ND FLR;  Service: Transplant;  Laterality: N/A;    VITRECTOMY BY PARS PLANA APPROACH Right 02/01/2022    Procedure: VITRECTOMY, PARS PLANA APPROACH;  Surgeon: Maximilian Montaño MD;  Location: [facility] OR 1ST FLR;  Service: Ophthalmology;  Laterality: Right;    VITRECTOMY BY PARS PLANA APPROACH Right 05/31/2022    Procedure: VITRECTOMY, PARS PLANA APPROACH;  Surgeon: Maximilian Montaño MD;  [facility];  Service: Ophthalmology;  Laterality: Right;     Family History   Problem Relation Age of Onset    Hypertension Mother     Heart disease Father     Diabetes Brother     Celiac disease Neg Hx     Cirrhosis Neg Hx     Colon cancer Neg Hx     Colon polyps Neg Hx     Crohn's disease Neg Hx     Inflammatory bowel disease Neg Hx     Liver cancer Neg Hx     Liver disease Neg Hx     Rectal cancer Neg Hx     Stomach cancer Neg Hx     Ulcerative colitis Neg Hx     Esophageal cancer Neg Hx     Hemochromatosis Neg Hx     Pancreatic cancer Neg Hx     Kidney cancer Neg Hx     Bladder Cancer Neg Hx     Uterine cancer Neg Hx     Ovarian cancer Neg Hx      Social History     Tobacco Use    Smoking status: Never    Smokeless tobacco: Never   Substance Use Topics    Alcohol use: No    Drug use: No     Review of Systems   Reason unable to perform ROS: See HPI for relevant ROS.       Physical Exam     Initial Vitals [04/06/24 0412]   BP Pulse Resp Temp SpO2   105/67 (!) 118 20 98.4 °F (36.9 °C) 97 %      MAP       --         Physical Exam    Nursing note and vitals reviewed.  Constitutional:   Alert, normal work of breathing, no acute distress   HENT:   Mouth/Throat: Oropharynx is clear and moist.   Eyes: Conjunctivae are normal. No scleral  icterus.   Cardiovascular:  Regular rhythm and intact distal pulses.           Tachy   Pulmonary/Chest: Breath sounds normal. No stridor. No respiratory distress.   Abdominal: Abdomen is soft. She exhibits no distension. There is no abdominal tenderness.   Musculoskeletal:         General: No edema.     Neurological: She is alert.   Skin: Skin is warm and dry.         ED Course   Critical Care    Date/Time: 4/6/2024 2:50 PM    Performed by: Jamel Ceja MD  Authorized by: Jamel Ceja MD  Direct patient critical care time: 10 minutes  Additional history critical care time: 5 minutes  Ordering / reviewing critical care time: 5 minutes  Documentation critical care time: 5 minutes  Consult with family critical care time: 5 minutes  Other critical care time: 5 minutes  Total critical care time (exclusive of procedural time) : 35 minutes  Critical care time was exclusive of separately billable procedures and treating other patients.  Critical care was necessary to treat or prevent imminent or life-threatening deterioration of the following conditions: sepsis.  Critical care was time spent personally by me on the following activities: development of treatment plan with patient or surrogate, obtaining history from patient or surrogate, ordering and performing treatments and interventions, ordering and review of laboratory studies, evaluation of patient's response to treatment, examination of patient, re-evaluation of patient's condition, review of old charts, pulse oximetry and ordering and review of radiographic studies.        Labs Reviewed   CBC W/ AUTO DIFFERENTIAL - Abnormal; Notable for the following components:       Result Value    RBC 3.12 (*)     Hemoglobin 9.2 (*)     Hematocrit 29.5 (*)     MCHC 31.2 (*)     RDW 15.0 (*)     Immature Granulocytes 0.6 (*)     Gran # (ANC) 11.2 (*)     Immature Grans (Abs) 0.08 (*)     Lymph # 0.5 (*)     Gran % 88.5 (*)     Lymph % 3.9 (*)     All other components within  normal limits   COMPREHENSIVE METABOLIC PANEL - Abnormal; Notable for the following components:    Chloride 111 (*)     CO2 16 (*)     BUN 26 (*)     All other components within normal limits   ISTAT PROCEDURE - Abnormal; Notable for the following components:    POC PH 7.326 (*)     POC PO2 35 (*)     POC HCO3 19.9 (*)     POC BE -6 (*)     POC TCO2 21 (*)     All other components within normal limits   ISTAT LACTATE - Abnormal; Notable for the following components:    POC Lactate 2.24 (*)     All other components within normal limits   CULTURE, BLOOD    Narrative:     Aerobic and anaerobic   CULTURE, BLOOD    Narrative:     Aerobic and anaerobic   LIPASE   SARS-COV2 (COVID) WITH FLU/RSV BY PCR   URINALYSIS, REFLEX TO URINE CULTURE   ISTAT LACTATE        ECG Results              EKG 12-lead (Final result)        Collection Time Result Time QRS Duration OHS QTC Calculation    04/06/24 04:47:27 04/06/24 09:15:13 78 431                     Final result by Interface, Lab In [facility] (04/06/24 09:15:22)                   Narrative:    Test Reason : A41.9,    Vent. Rate : 118 BPM     Atrial Rate : 118 BPM     P-R Int : 152 ms          QRS Dur : 078 ms      QT Int : 308 ms       P-R-T Axes : 063 060 060 degrees     QTc Int : 431 ms    Sinus tachycardia  Possible Left atrial enlargement  Borderline Abnormal ECG  When compared with ECG of 04-FEB-2024 15:54,  No significant change was found  Confirmed by Sohail SANZ MD ([id number]) on 4/6/2024 9:15:12 AM    Referred By: [email or web address]   SELF           Confirmed By:Sohail SANZ MD                                  Imaging Results              X-Ray Chest AP Portable (Final result)  Result time 04/06/24 05:05:17      Final result by Noé Hodges MD (04/06/24 05:05:17)                   Impression:      No acute findings in the chest.      Electronically signed by: Noé Hodges MD  Date:    04/06/2024  Time:    05:05               Narrative:    EXAMINATION:  XR CHEST AP  PORTABLE    CLINICAL HISTORY:  Sepsis;    TECHNIQUE:  Single frontal view of the chest was performed.    COMPARISON:  06/17/2023.    FINDINGS:  No consolidation, pleural effusion or pneumothorax.    Cardiomediastinal silhouette is unremarkable.                                       Medications   predniSONE tablet 5 mg (5 mg Oral Given 4/6/24 0915)   sodium bicarbonate tablet 1,300 mg (1,300 mg Oral Given 4/6/24 1437)   tacrolimus capsule 4 mg (4 mg Oral Given 4/6/24 0915)   promethazine tablet 12.5 mg (has no administration in time range)   vancomycin - pharmacy to dose (has no administration in time range)   vancomycin (VANCOCIN) 1,000 mg in dextrose 5 % (D5W) 250 mL IVPB (Vial-Mate) (has no administration in time range)   0.9%  NaCl infusion ( Intravenous New Bag 4/6/24 1445)   acetaminophen tablet 650 mg (650 mg Oral Given 4/6/24 1437)   lactated ringers bolus 1,000 mL (0 mLs Intravenous Stopped 4/6/24 0927)   piperacillin-tazobactam (ZOSYN) 4.5 g in dextrose 5 % in water (D5W) 100 mL IVPB (MB+) (0 g Intravenous Stopped 4/6/24 0612)   vancomycin 1,500 mg in dextrose 5 % (D5W) 250 mL IVPB (Vial-Mate) (0 mg Intravenous Stopped 4/6/24 0837)   ondansetron injection 4 mg (4 mg Intravenous Given 4/6/24 0622)   acetaminophen tablet 650 mg (650 mg Oral Given 4/6/24 0705)     Medical Decision Making  29 y.o. female with kidney transplant (2022) DM, HLD, HTN, history of gastroparesis presents for fever and chills for the past day.  Patient with symptoms concerning for sepsis  Sepsis protocols initiated, patient started on IV fluids, IV antibiotics, broad workup including blood cultures ordered, will evaluate for possible source.    Differentials include bacteremia, pneumonia, UTI, pyelonephritis, viral illness, gastroparesis, doubt infected kidney stone or bowel obstruction  IV antibiotics were given empirically  Full 30 cc/kg bolus was not ordered as patient does have contraindications to aggressive IV fluid resuscitation  (heart failure)  Patient mildly tachycardic and hypotensive on arrival, actively nauseous.  No clear primary source for infection patient does have vomiting and diarrhea but her abdominal exam is unremarkable, she has no abdominal tenderness.  Lungs clear, no hypoxia  Patient denies any decreased urine output    Amount and/or Complexity of Data Reviewed  Labs: ordered. Decision-making details documented in ED Course.  Radiology: ordered. Decision-making details documented in ED Course.    Risk  OTC drugs.  Prescription drug management.  Decision regarding hospitalization.               ED Course as of 24 1450   Sat 2024   0616 X-Ray Chest AP Portable  Findings, per independent interpretation:  Symmetrically expanded lungs, no focal consolidation, no evidence of edema [OK]   0639 POC Lactate(!): 2.24 [OK]   0639 Blood pressure improving with IV fluids.  Patient care handed off to oncoming team pending labs and final dispo, likely admit [OK]      ED Course User Index  [OK] Jamel Ceja MD                           Clinical Impression:  Final diagnoses:  [A41.9] Sepsis (Primary)  [Z94.0] Status post -donor simultaneous pancreas kidney transplantation (Chronic)  [R11.2] Nausea and vomiting, unspecified vomiting type          ED Disposition Condition    Admit                 Jamel Ceja MD  24 1450

## 2024-04-06 NOTE — CONSULTS
"Pharmacokinetic Initial Assessment: IV Vancomycin    Assessment/Plan:    Initiate intravenous vancomycin with loading dose of 1500 mg once followed by a maintenance dose of vancomycin 1000mg IV every 12 hours  Desired empiric serum trough concentration is 10 to 20 mcg/mL  Draw vancomycin trough level 60 min prior to third dose on 4/7 at approximately 0500  Pharmacy will continue to follow and monitor vancomycin.      Please contact pharmacy at extension with any questions regarding this assessment.     Thank you for the consult,   Derek Montiel       Patient brief summary:  Isabela Read is a 29 y.o. female initiated on antimicrobial therapy with IV Vancomycin for treatment of suspected bacteremia    Drug Allergies:   Review of patient's allergies indicates:  No Known Allergies    Actual Body Weight:   74.4 kg    Renal Function:   Estimated Creatinine Clearance: 74.6 mL/min (based on SCr of 1.1 mg/dL).,     Dialysis Method (if applicable):  N/A    CBC (last 72 hours):  Recent Labs   Lab Result Units 04/06/24  0510   WBC K/uL 12.67   Hemoglobin g/dL 9.2*   Hematocrit % 29.5*   Platelets K/uL 159   Gran % % 88.5*   Lymph % % 3.9*   Mono % % 6.5   Eosinophil % % 0.3   Basophil % % 0.2   Differential Method  Automated       Metabolic Panel (last 72 hours):  Recent Labs   Lab Result Units 04/06/24  0510   Sodium mmol/L 139   Potassium mmol/L 3.8   Chloride mmol/L 111*   CO2 mmol/L 16*   Glucose mg/dL 101   BUN mg/dL 26*   Creatinine mg/dL 1.1   Albumin g/dL 4.0   Total Bilirubin mg/dL 0.7   Alkaline Phosphatase U/L 63   AST U/L 12   ALT U/L 10       Drug levels (last 3 results):  No results for input(s): "VANCOMYCINRA", "VANCORANDOM", "VANCOMYCINPE", "VANCOPEAK", "VANCOMYCINTR", "VANCOTROUGH" in the last 72 hours.    Microbiologic Results:  Microbiology Results (last 7 days)       Procedure Component Value Units Date/Time    Blood culture x two cultures. Draw prior to antibiotics. [[id number]] Collected: " 04/06/24 0537    Order Status: Sent Specimen: Blood from Peripheral, Forearm, Right Updated: 04/06/24 0613    Blood culture x two cultures. Draw prior to antibiotics. [[id number]] Collected: 04/06/24 0510    Order Status: Sent Specimen: Blood from Peripheral, Antecubital, Right Updated: 04/06/24 0519

## 2024-04-06 NOTE — HPI
Isabela Read is a 29 year old Black woman with history of diabetes mellitus type 1 with retinopathy, nephrotic syndrome, gastroparesis, and Charcot arthropathy, history of  donor pancreas and kidney transplant on 2022 (immunosuppressed on tacrolimus, mycophenolate, and prednisone), metabolic acidosis, heart failure with preserved ejection fraction, interstitial pulmonary disease, history of E coli bacteremia on 2023. She lives in [facility]. Her primary care physician is Dr. Tavo Koo.    She presented to [facility] on 2024 with fever, nausea, and vomiting that started the day before. She also had urinary frequency and burning with urination that occurred at the same time but improved. She had been hospitalized for urinary tract infections in January, May, and 2023.    In the emergency department, temperature was as high as 100.1° Fahrenheit, heart rate was 118, blood pressures were normal. Labs showed WBC 03448/uL, hemoglobin and hematocrit 9.2 g/dL and 29.5% (decreased from 12.1 and 39.9 on 2024), bicarbonate 16 mmol/L (stable from 15 on 2024). X-ray showed no acute findings. Urine was unable to be obtained for urinalysis. She was given acetaminophen, 1 liter of lactated Ringer's solution, ondansetron, piperacillin-tazobactam, vancomycin. She was admitted to Hospital Medicine Team [facility]

## 2024-04-06 NOTE — SUBJECTIVE & OBJECTIVE
Past Medical History:   Diagnosis Date    Acute cystitis without hematuria 11/25/2022    Acute kidney injury superimposed on chronic kidney disease 4/7/2021    Anemia     Anemia in ESRD (end-stage renal disease) 7/29/2020    Lab Results  Component Value Date   WBC 7.23 07/29/2020   HGB 7.1 (L) 07/29/2020   HCT 22.2 (L) 07/29/2020   MCV 91 07/29/2020    (H) 07/29/2020   Following with hematology and scheduled to undergo iron infusions    Bacteremia due to Escherichia coli 1/23/2023    Bilious vomiting with nausea 12/12/2022    Chronic hypertension with exacerbation during pregnancy in second trimester 11/6/2020    Current regimen (11/6/20):  - Carvedilol 12.5 mg BID - Nifedipine 30 mg daily Baseline CKD + proteinuria    Chronic kidney disease     Depression     Diabetes mellitus     Diabetic retinopathy     Diarrhea     Encounter for blood transfusion     End stage renal failure on dialysis     Fever 12/1/2022    Fever of undetermined origin 12/12/2022    Gastroparesis     Glaucoma     Hepatomegaly 4/29/2021    History of diabetes mellitus, type I 12/20/2022    Hx of psychiatric care     Hyperlipidemia     Hypertension     Nausea and vomiting 12/12/2022    Nephrotic syndrome     Nephrotic syndrome 3/4/2021    Nonspecific reaction to tuberculin skin test without active tuberculosis 10/1/2021    Orthostatic hypotension 1/25/2023    Palpitations     Poor fetal growth affecting management of mother in second trimester 10/15/2020    Pyelonephritis, acute 11/26/2022    Restrictive lung disease     Retinal detachment     Sepsis 12/1/2022    Sepsis 11/25/2022    Sepsis due to Escherichia coli 1/23/2023    Severe pre-eclampsia in second trimester 11/6/2020    Severe sepsis 11/25/2022    SIRS (systemic inflammatory response syndrome) 12/6/2022    Status post pancreas transplantation 11/26/2022 11/2/2022    Type 1 diabetes mellitus with end-stage renal disease (ESRD) 2/24/2015    Followed by Dr. Mayo.  Last A1C was 13   Currently on lantus 20 units qAM and humolog 10 units with meals  - a fetal echocardiogram around 22-24 weeks - needs eye exam, podiatry exam  - needs EKG, maternal echo and fu with cardiology  - ASA 81mg, folic acid 4mg PO daily - hemoglobin A1c every 4-6 weeks -24 hour urine for protein and creatinine clearance should be performed. Patient will also need a       Past Surgical History:   Procedure Laterality Date    AV FISTULA PLACEMENT Left 04/07/2021    Procedure: CREATION, AV FISTULA;  Surgeon: Roberto Ryan MD;  Location: [facility] OR [address] [place] [address];  Service: Peripheral Vascular;  Laterality: Left;    COLONOSCOPY N/A 03/16/2022    Procedure: COLONOSCOPY;  Surgeon: Tavo Kwok MD;  [facility]  Service: Endoscopy;  Laterality: N/A;  Questionable history of delayed gastric emptying longstanding diabetes now on eating pre transplant workup for history of nausea vomiting which seems to have improved with dialysis also chronic diarrhea and history of anemia pre transplant workup for kidney transplant. 3    CYSTOSCOPY      ESOPHAGOGASTRODUODENOSCOPY N/A 03/16/2022    Procedure: EGD (ESOPHAGOGASTRODUODENOSCOPY);  Surgeon: Tavo Kwok MD;  [facility]  Service: Endoscopy;  Laterality: N/A;  Questionable history of delayed gastric emptying longstanding diabetes now on eating pre transplant workup for history of nausea vomiting which seems to have improved with dialysis also chronic diarrhea and history of anemia pre transplant workup for    FISTULOGRAM N/A 08/11/2021    Procedure: Fistulogram;  Surgeon: Roberto Ryan MD;  Location: [facility] CATH LAB;  Service: Cardiology;  Laterality: N/A;    KIDNEY TRANSPLANT Right 11/02/2022    Procedure: TRANSPLANT, KIDNEY;  Surgeon: Kumar Rodriges Jr., MD;  [facility];  Service: Transplant;  Laterality: Right;    LASER PHOTOCOAGULATION OF RETINA Right 05/31/2022    Procedure: PHOTOCOAGULATION, RETINA, USING  LASER;  Surgeon: Maximilian Montaño MD;  Location: [facility] OR 1ST FLR;  Service: Ophthalmology;  Laterality: Right;    PERCUTANEOUS TRANSLUMINAL ANGIOPLASTY OF ARTERIOVENOUS FISTULA N/A 08/11/2021    Procedure: PTA, AV FISTULA;  Surgeon: Roberto Ryan MD;  Location: [facility] CATH LAB;  Service: Cardiology;  Laterality: N/A;    REMOVAL IMPLANT, POSTERIOR SEGMENT, INTRAOCULAR Right 02/01/2022    Procedure: REMOVAL IMPLANT, POSTERIOR SEGMENT, INTRAOCULAR;  Surgeon: Maximilian Montaño MD;  Location: [facility] OR 1ST FLR;  Service: Ophthalmology;  Laterality: Right;    REPAIR OF RETINAL DETACHMENT WITH VITRECTOMY Right 01/25/2022    Procedure: REPAIR, RETINAL DETACHMENT, WITH VITRECTOMY, MEMBRANE PEEL, LASER, INJECTION OF GAS VS OIL;  Surgeon: Maximilian Montaño MD;  [facility];  Service: Ophthalmology;  Laterality: Right;    REPAIR, RETINAL DETACHMENT, COMPLEX, WITH VITRECTOMY AND MEMBRANE PEELING Right 3/21/2023    Procedure: REPAIR,RETINAL DETACHMENT,COMPLEX,WITH VITRECTOMY AND MEMBRANE PEELING;  Surgeon: Maximilian Montaño MD;  [facility];  Service: Ophthalmology;  Laterality: Right;  25ga    REVISION OF ARTERIOVENOUS FISTULA Left 12/10/2021    Procedure: REVISION, AV FISTULA with BVT;  Surgeon: Roberto Ryan MD;  Location: [facility] OR 2ND FLR;  Service: Peripheral Vascular;  Laterality: Left;    TRANSPLANTATION OF PANCREAS N/A 11/02/2022    Procedure: TRANSPLANT, PANCREAS;  Surgeon: Kumar Rodriges Jr., MD;  Location: [facility] OR 2ND FLR;  Service: Transplant;  Laterality: N/A;    VITRECTOMY BY PARS PLANA APPROACH Right 02/01/2022    Procedure: VITRECTOMY, PARS PLANA APPROACH;  Surgeon: Maximilian Montaño MD;  Location: [facility] OR 1ST FLR;  Service: Ophthalmology;  Laterality: Right;    VITRECTOMY BY PARS PLANA APPROACH Right 05/31/2022    Procedure: VITRECTOMY, PARS PLANA APPROACH;  Surgeon: Maximilian Montaño MD;  Location: [facility] OR 1ST FLR;  Service: Ophthalmology;  Laterality:  Right;       Review of patient's allergies indicates:  No Known Allergies    No current facility-administered medications on file prior to encounter.     Current Outpatient Medications on File Prior to Encounter   Medication Sig    ketoconazole (NIZORAL) 2 % cream Apply topically 2 (two) times daily as needed for dry areas of face    medroxyPROGESTERone (DEPO-PROVERA) 150 mg/mL Syrg Inject 1 mL (150 mg total) into the muscle every 3 (three) months.    mycophenolate (CELLCEPT) 250 mg Cap Take 3 capsules (750 mg total) by mouth 2 (two) times daily.    predniSONE (DELTASONE) 5 MG tablet Take 1 tablet (5 mg total) by mouth once daily.    promethazine (PHENERGAN) 12.5 MG Tab Take 1 tablet (12.5 mg total) by mouth every 6 (six) hours as needed (nausea).    sodium bicarbonate 650 MG tablet Take 2 tablets (1,300 mg total) by mouth 3 (three) times daily.    tacrolimus (PROGRAF) 1 MG Cap Take 4 capsules (4 mg total) by mouth every 12 (twelve) hours.    tretinoin (RETIN-A) 0.05 % cream Apply topically every evening. Start with every other night and move up to nightly after 2 weeks if not too dry.    tretinoin (RETIN-A) 0.1 % cream Apply topically every evening. Increase Retin A to 0.1%.  Rotate this with old one until weaker strength runs out and then go nightly with 0.1%.     Family History       Problem Relation (Age of Onset)    Diabetes Brother    Heart disease Father    Hypertension Mother          Tobacco Use    Smoking status: Never    Smokeless tobacco: Never   Substance and Sexual Activity    Alcohol use: No    Drug use: No    Sexual activity: Not Currently     Partners: Male     Comment: monogamous     Review of Systems   Constitutional:  Positive for fever. Negative for diaphoresis.   HENT:  Negative for trouble swallowing and voice change.    Eyes:  Negative for pain and redness.   Respiratory:  Negative for cough and shortness of breath.    Cardiovascular:  Negative for chest pain and palpitations.    Gastrointestinal:  Positive for nausea and vomiting.   Genitourinary:  Positive for dysuria and frequency.   Musculoskeletal:  Negative for neck pain and neck stiffness.   Skin:  Negative for rash and wound.   Neurological:  Negative for seizures and syncope.     Objective:     Vital Signs (Most Recent):  Temp: (!) 101 °F (38.3 °C) (04/06/24 1437)  Pulse: (!) 120 (04/06/24 1522)  Resp: 18 (04/06/24 1348)  BP: (!) 119/55 (04/06/24 1348)  SpO2: 100 % (04/06/24 1348) Vital Signs (24h Range):  Temp:  [98.4 °F (36.9 °C)-101 °F (38.3 °C)] 101 °F (38.3 °C)  Pulse:  [107-120] 120  Resp:  [11-20] 18  SpO2:  [97 %-100 %] 100 %  BP: (105-137)/(55-75) 119/55     Weight: 74.4 kg (164 lb)  Body mass index is 28.15 kg/m².     Physical Exam  Vitals and nursing note reviewed.   Constitutional:       General: She is not in acute distress.     Appearance: She is well-developed. She is not toxic-appearing or diaphoretic.   HENT:      Head: Normocephalic and atraumatic.   Eyes:      General: No scleral icterus.     Conjunctiva/sclera: Conjunctivae normal.   Cardiovascular:      Rate and Rhythm: Regular rhythm. Tachycardia present.      Heart sounds: Normal heart sounds. No murmur heard.  Pulmonary:      Effort: No respiratory distress.      Breath sounds: Normal breath sounds. No wheezing or rales.   Abdominal:      General: There is no distension.      Palpations: Abdomen is soft.      Tenderness: There is no abdominal tenderness. There is no guarding.   Skin:     General: Skin is warm and dry.      Coloration: Skin is not jaundiced or pale.   Neurological:      Mental Status: She is alert and oriented to person, place, and time.      Motor: No tremor or seizure activity.   Psychiatric:         Attention and Perception: Attention normal.         Mood and Affect: Mood and affect normal.         Behavior: Behavior is cooperative.                Significant Labs: All pertinent labs within the past 24 hours have been  reviewed.    Significant Imaging: I have reviewed all pertinent imaging results/findings within the past 24 hours.  X-Ray Chest AP Portable 4/06/24: FINDINGS:   No consolidation, pleural effusion or pneumothorax.   Cardiomediastinal silhouette is unremarkable.   Impression:  No acute findings in the chest.

## 2024-04-07 PROBLEM — N39.0 SEPSIS SECONDARY TO UTI: Status: ACTIVE | Noted: 2024-04-06

## 2024-04-07 LAB
ERYTHROCYTE [DISTWIDTH] IN BLOOD BY AUTOMATED COUNT: 15 % (ref 11.5–14.5)
FERRITIN SERPL-MCNC: 420 NG/ML (ref 20–300)
FOLATE SERPL-MCNC: 5.9 NG/ML (ref 4–24)
HCT VFR BLD AUTO: 31.6 % (ref 37–48.5)
HGB BLD-MCNC: 9.9 G/DL (ref 12–16)
IRON SERPL-MCNC: <10 UG/DL (ref 30–160)
MCH RBC QN AUTO: 30.4 PG (ref 27–31)
MCHC RBC AUTO-ENTMCNC: 31.3 G/DL (ref 32–36)
MCV RBC AUTO: 97 FL (ref 82–98)
PLATELET # BLD AUTO: 215 K/UL (ref 150–450)
PMV BLD AUTO: 10.6 FL (ref 9.2–12.9)
RBC # BLD AUTO: 3.26 M/UL (ref 4–5.4)
SATURATED IRON: ABNORMAL % (ref 20–50)
TOTAL IRON BINDING CAPACITY: 234 UG/DL (ref 250–450)
TRANSFERRIN SERPL-MCNC: 158 MG/DL (ref 200–375)
VANCOMYCIN SERPL-MCNC: 17.6 UG/ML
VIT B12 SERPL-MCNC: 360 PG/ML (ref 210–950)
WBC # BLD AUTO: 11.17 K/UL (ref 3.9–12.7)

## 2024-04-07 PROCEDURE — 36415 COLL VENOUS BLD VENIPUNCTURE: CPT | Performed by: HOSPITALIST

## 2024-04-07 PROCEDURE — 82607 VITAMIN B-12: CPT | Performed by: HOSPITALIST

## 2024-04-07 PROCEDURE — 82728 ASSAY OF FERRITIN: CPT | Performed by: HOSPITALIST

## 2024-04-07 PROCEDURE — 83540 ASSAY OF IRON: CPT | Performed by: HOSPITALIST

## 2024-04-07 PROCEDURE — 20600001 HC STEP DOWN PRIVATE ROOM

## 2024-04-07 PROCEDURE — 63600175 PHARM REV CODE 636 W HCPCS: Performed by: HOSPITALIST

## 2024-04-07 PROCEDURE — 25000003 PHARM REV CODE 250: Performed by: HOSPITALIST

## 2024-04-07 PROCEDURE — 82746 ASSAY OF FOLIC ACID SERUM: CPT | Performed by: HOSPITALIST

## 2024-04-07 PROCEDURE — 85027 COMPLETE CBC AUTOMATED: CPT | Performed by: HOSPITALIST

## 2024-04-07 PROCEDURE — 80202 ASSAY OF VANCOMYCIN: CPT | Performed by: HOSPITALIST

## 2024-04-07 RX ORDER — CYANOCOBALAMIN (VITAMIN B-12) 250 MCG
250 TABLET ORAL DAILY
Status: DISCONTINUED | OUTPATIENT
Start: 2024-04-07 | End: 2024-04-08 | Stop reason: HOSPADM

## 2024-04-07 RX ADMIN — TACROLIMUS 4 MG: 1 CAPSULE ORAL at 06:04

## 2024-04-07 RX ADMIN — SODIUM BICARBONATE 650 MG TABLET 1300 MG: at 04:04

## 2024-04-07 RX ADMIN — TACROLIMUS 4 MG: 1 CAPSULE ORAL at 08:04

## 2024-04-07 RX ADMIN — CEFTRIAXONE 1 G: 1 INJECTION, POWDER, FOR SOLUTION INTRAMUSCULAR; INTRAVENOUS at 08:04

## 2024-04-07 RX ADMIN — SODIUM BICARBONATE 650 MG TABLET 1300 MG: at 08:04

## 2024-04-07 RX ADMIN — CYANOCOBALAMIN TAB 250 MCG 250 MCG: 250 TAB at 01:04

## 2024-04-07 RX ADMIN — VANCOMYCIN HYDROCHLORIDE 1000 MG: 1 INJECTION, POWDER, LYOPHILIZED, FOR SOLUTION INTRAVENOUS at 09:04

## 2024-04-07 RX ADMIN — PROMETHAZINE HYDROCHLORIDE 12.5 MG: 12.5 TABLET ORAL at 04:04

## 2024-04-07 RX ADMIN — SODIUM BICARBONATE 650 MG TABLET 1300 MG: at 09:04

## 2024-04-07 RX ADMIN — PREDNISONE 5 MG: 5 TABLET ORAL at 08:04

## 2024-04-07 RX ADMIN — VANCOMYCIN HYDROCHLORIDE 750 MG: 750 INJECTION, POWDER, LYOPHILIZED, FOR SOLUTION INTRAVENOUS at 09:04

## 2024-04-07 NOTE — PLAN OF CARE
Problem: Adult Inpatient Plan of Care  Goal: Plan of Care Review  Outcome: Ongoing, Progressing  Goal: Patient-Specific Goal (Individualized)  Outcome: Ongoing, Progressing  Goal: Absence of Hospital-Acquired Illness or Injury  Outcome: Ongoing, Progressing  Goal: Optimal Comfort and Wellbeing  Outcome: Ongoing, Progressing  Goal: Readiness for Transition of Care  Outcome: Ongoing, Progressing     Problem: Adjustment to Illness (Sepsis/Septic Shock)  Goal: Optimal Coping  Outcome: Ongoing, Progressing     Problem: Infection Progression (Sepsis/Septic Shock)  Goal: Absence of Infection Signs and Symptoms  Outcome: Ongoing, Progressing

## 2024-04-07 NOTE — PLAN OF CARE
[facility] - Telemetry Stepdown  Initial Discharge Assessment       Primary Care Provider: Tavo Koo MD    Admission Diagnosis: Sepsis [A41.9]    Admission Date: 4/6/2024  Expected Discharge Date:     Transition of Care Barriers: None    Payor: [employer] [facility] / Plan: [employer] / Product Type: Medicare Advantage /     Extended Emergency Contact Information  Primary Emergency Contact: Kiersten Read   [place]  Mobile Phone: [phone]  Relation: Mother    Discharge Plan A: Home  Discharge Plan B: Home with family      [facility]  [address] [address]  Phone: [phone] Fax: [phone]    [facility] [address] [place] [address] [address] [facility]  [address] [facility] [address]  Phone: [phone] Fax: [phone]    [facility]  [address] [facility] [address]  [facility] [address]  Phone: [phone] Fax: [phone]    SW met with patient at bedside to complete discharge planning assessment.  Patient alert and oriented xs 4.  Patient verified all demographic information on facesheet is correct.  Patient verified PCP is Dr. Koo.  Patient verified primary health insurance is [employer].  Patient with NO home health or DME.  Patient with NO POA or Living Will.  Patient not on dialysis or medication coumadin.  Patient with no 30 day admission.  Patient with no financial issues at this time.  Patient will provide transportation upon discharge from facility.  Patient independent with ADLs, live with mother and adult brother, drives self.    .  Initial Assessment (most recent)       Adult Discharge Assessment - 04/07/24 1414          Discharge Assessment    Assessment Type Discharge Planning Assessment     Confirmed/corrected address, phone number and insurance Yes     Confirmed Demographics Correct on Facesheet     Source of Information patient     Communicated TITA with  patient/caregiver Date not available/Unable to determine     People in Home sibling(s);parent(s)     Facility Arrived From: home     Do you expect to return to your current living situation? Yes     Do you have help at home or someone to help you manage your care at home? Yes     Who are your caregiver(s) and their phone number(s)? family     Prior to hospitilization cognitive status: Alert/Oriented     Current cognitive status: Alert/Oriented     Walking or Climbing Stairs Difficulty no     Dressing/Bathing Difficulty no     Equipment Currently Used at Home none     Readmission within 30 days? No     Patient currently being followed by outpatient case management? No     Do you currently have service(s) that help you manage your care at home? No     Do you take prescription medications? Yes     Do you have prescription coverage? Yes     Do you have any problems affording any of your prescribed medications? No     Is the patient taking medications as prescribed? yes     Who is going to help you get home at discharge? family     How do you get to doctors appointments? car, drives self     Are you on dialysis? No     Do you take coumadin? No     Discharge Plan A Home     Discharge Plan B Home with family     DME Needed Upon Discharge  none     Discharge Plan discussed with: Patient     Transition of Care Barriers None        Physical Activity    On average, how many days per week do you engage in moderate to strenuous exercise (like a brisk walk)? 3 days     On average, how many minutes do you engage in exercise at this level? 60 min        Financial Resource Strain    How hard is it for you to pay for the very basics like food, housing, medical care, and heating? Not hard at all        Housing Stability    In the last 12 months, was there a time when you were not able to pay the mortgage or rent on time? No     In the last 12 months, was there a time when you did not have a steady place to sleep or slept in a shelter  (including now)? No        Transportation Needs    In the past 12 months, has lack of transportation kept you from medical appointments or from getting medications? No     In the past 12 months, has lack of transportation kept you from meetings, work, or from getting things needed for daily living? No        Food Insecurity    Within the past 12 months, you worried that your food would run out before you got the money to buy more. Never true     Within the past 12 months, the food you bought just didn't last and you didn't have money to get more. Never true        Stress    Do you feel stress - tense, restless, nervous, or anxious, or unable to sleep at night because your mind is troubled all the time - these days? Not at all        Social Connections    In a typical week, how many times do you talk on the phone with family, friends, or neighbors? More than three times a week     How often do you get together with friends or relatives? More than three times a week     How often do you attend Druze or Temple services? More than 4 times per year     Do you belong to any clubs or organizations such as Druze groups, unions, fraternal or athletic groups, or school groups? Yes     How often do you attend meetings of the clubs or organizations you belong to? Never     Are you , , , , never , or living with a partner? Never         Alcohol Use    Q1: How often do you have a drink containing alcohol? 2-3 times a week     Q2: How many drinks containing alcohol do you have on a typical day when you are drinking? 1 or 2     Q3: How often do you have six or more drinks on one occasion? Never

## 2024-04-07 NOTE — PROGRESS NOTES
Pharmacokinetic Assessment Follow Up: IV Vancomycin    Vancomycin serum concentration assessment(s):    -Vancomycin trough 17.6, within goal of 15 - 20 for sepsis 2/2 UTI  -Trough drawn prior to 3rd dose, suspect further accumulation to occur  -Decrease vancomycin to 750 mg IVPB Q12H  -History of kidney txp, scr stable at 1.1  -Next trough 04/09 @ 0900    Drug levels (last 3 results):  Recent Labs   Lab Result Units 04/07/24  0716   Vancomycin, Random ug/mL 17.6       Pharmacy will continue to follow and monitor vancomycin.    Please contact pharmacy at extension [phone] for questions regarding this assessment.    Thank you for the consult,   Mikel Armijo       Patient brief summary:  Isabela Read is a 29 y.o. female initiated on antimicrobial therapy with IV Vancomycin for treatment of sepsis    The patient's current regimen is vancomycin 1g IVPB Q12H    Drug Allergies:   Review of patient's allergies indicates:  No Known Allergies    Actual Body Weight:   74 kg    Renal Function:   Estimated Creatinine Clearance: 74.6 mL/min (based on SCr of 1.1 mg/dL).,     Dialysis Method (if applicable):  N/A    CBC (last 72 hours):  Recent Labs   Lab Result Units 04/06/24  0510 04/07/24  0716   WBC K/uL 12.67 11.17   Hemoglobin g/dL 9.2* 9.9*   Hematocrit % 29.5* 31.6*   Platelets K/uL 159 215   Gran % % 88.5*  --    Lymph % % 3.9*  --    Mono % % 6.5  --    Eosinophil % % 0.3  --    Basophil % % 0.2  --    Differential Method  Automated  --        Metabolic Panel (last 72 hours):  Recent Labs   Lab Result Units 04/06/24  0510 04/06/24  2244   Sodium mmol/L 139  --    Potassium mmol/L 3.8  --    Chloride mmol/L 111*  --    CO2 mmol/L 16*  --    Glucose mg/dL 101  --    Glucose, UA   --  Negative   BUN mg/dL 26*  --    Creatinine mg/dL 1.1  --    Albumin g/dL 4.0  --    Total Bilirubin mg/dL 0.7  --    Alkaline Phosphatase U/L 63  --    AST U/L 12  --    ALT U/L 10  --        Vancomycin Administrations:  vancomycin  given in the last 96 hours                     vancomycin (VANCOCIN) 1,000 mg in dextrose 5 % (D5W) 250 mL IVPB (Vial-Mate) (mg) 1,000 mg New Bag 04/07/24 0928     1,000 mg New Bag 04/06/24 2032    vancomycin 1,500 mg in dextrose 5 % (D5W) 250 mL IVPB (Vial-Mate) (mg) 1,500 mg New Bag 04/06/24 0621                    Microbiologic Results:  Microbiology Results (last 7 days)       Procedure Component Value Units Date/Time    Blood culture x two cultures. Draw prior to antibiotics. [[id number]] Collected: 04/06/24 0537    Order Status: Completed Specimen: Blood from Peripheral, Forearm, Right Updated: 04/07/24 0812     Blood Culture, Routine No Growth to date      No Growth to date    Narrative:      Aerobic and anaerobic    Blood culture x two cultures. Draw prior to antibiotics. [[id number]] Collected: 04/06/24 0510    Order Status: Completed Specimen: Blood from Peripheral, Antecubital, Right Updated: 04/07/24 0613     Blood Culture, Routine No Growth to date      No Growth to date    Narrative:      Aerobic and anaerobic    Urine culture [[id number]] Collected: 04/06/24 2244    Order Status: No result Specimen: Urine Updated: 04/06/24 2301

## 2024-04-07 NOTE — PROGRESS NOTES
[facility]  Progress Note    Patient Name: Isabela Read  MRN: [id number]  Patient Class: IP- Inpatient   Admission Date: 2024  Length of Stay: 1 days  Attending Physician: Adam Saunders MD  Primary Care Provider: Tavo Koo MD        Subjective:     Principal Problem:Sepsis secondary to UTI        HPI:  Isabela Read is a 29 year old Black woman with history of diabetes mellitus type 1 with retinopathy, nephrotic syndrome, gastroparesis, and Charcot arthropathy, history of  donor pancreas and kidney transplant on 2022 (immunosuppressed on tacrolimus, mycophenolate, and prednisone), metabolic acidosis, heart failure with preserved ejection fraction, interstitial pulmonary disease, history of E coli bacteremia on 2023. She lives in [facility]. Her primary care physician is Dr. Tavo Koo.    She presented to [facility] on 2024 with fever, nausea, and vomiting that started the day before. She also had urinary frequency and burning with urination that occurred at the same time but improved. She had been hospitalized for urinary tract infections in January, May, and 2023.    In the emergency department, temperature was as high as 100.1° Fahrenheit, heart rate was 118, blood pressures were normal. Labs showed WBC 30133/uL, hemoglobin and hematocrit 9.2 g/dL and 29.5% (decreased from 12.1 and 39.9 on 2024), bicarbonate 16 mmol/L (stable from 15 on 2024). X-ray showed no acute findings. Urine was unable to be obtained for urinalysis. She was given acetaminophen, 1 liter of lactated Ringer's solution, ondansetron, piperacillin-tazobactam, vancomycin. She was admitted to Hospital Medicine Team R.    Overview/Hospital Course:  Qualitative PCR test for COVID-19, influenza, and RSV was negative. She was put on vancomycin and ceftriaxone. Urinalysis showed 1+ protein and 87 WBC/hpf. Labs  showed low iron, TIBC, and transferrin with elevated ferritin. Vitamin B12 was relatively low.     Interval History: having nausea but less    Review of Systems   Gastrointestinal:  Positive for nausea. Negative for abdominal pain.   Neurological:  Negative for seizures and syncope.     Objective:     Vital Signs (Most Recent):  Temp: 99.4 °F (37.4 °C) (04/07/24 1149)  Pulse: (!) 116 (04/07/24 1149)  Resp: 18 (04/07/24 1149)  BP: 106/60 (04/07/24 1149)  SpO2: 99 % (04/07/24 1149) Vital Signs (24h Range):  Temp:  [98.7 °F (37.1 °C)-100.8 °F (38.2 °C)] 99.4 °F (37.4 °C)  Pulse:  [105-118] 116  Resp:  [18-20] 18  SpO2:  [98 %-100 %] 99 %  BP: (106-122)/(53-73) 106/60     Weight: 74.4 kg (164 lb)  Body mass index is 28.15 kg/m².    Intake/Output Summary (Last 24 hours) at 4/7/2024 1551  Last data filed at 4/7/2024 1030  Gross per 24 hour   Intake 600 ml   Output --   Net 600 ml         Physical Exam  Vitals and nursing note reviewed.   Constitutional:       General: She is in acute distress.      Appearance: She is well-developed. She is not toxic-appearing or diaphoretic.      Interventions: She is not intubated.  Pulmonary:      Effort: Pulmonary effort is normal. No accessory muscle usage or respiratory distress. She is not intubated.   Neurological:      Mental Status: She is alert and oriented to person, place, and time. Mental status is at baseline.      Motor: No seizure activity.   Psychiatric:         Attention and Perception: Attention normal.         Mood and Affect: Mood and affect normal.         Behavior: Behavior is cooperative.             Significant Labs: All pertinent labs within the past 24 hours have been reviewed.    Significant Imaging: I have reviewed all pertinent imaging results/findings within the past 24 hours.    Assessment/Plan:      * Sepsis secondary to UTI  Nausea & vomiting   Has frequent UTIs. Giving ceftriaxone and vancomycin for now. Symptoms improving. Follow up urine culture  results.     Normocytic anemia  Labs suggest anemia of inflammation and B12 deficiency. Treat infection and recheck iron studies outpatient. Give vitamin B12.     Interstitial pulmonary disease, unspecified  No shortness of breath or hypoxia. Stable.      Status post -donor simultaneous pancreas kidney transplantation  Long-term use of immunosuppressant medication   Long term systemic steroid user   Continue home tacrolimus and prednisone. Hold home mycophenolate due to active infection.     (HFpEF) heart failure with preserved ejection fraction  Stable.       VTE Risk Mitigation (From admission, onward)      None            Discharge Planning   TITA:      Code Status: Full Code   Is the patient medically ready for discharge?:     Reason for patient still in hospital (select all that apply): Patient trending condition and Treatment  Discharge Plan A: Home                  Adam Saunders MD  Department of Hospital Medicine   [facility] - Telemetry Stepdown

## 2024-04-07 NOTE — SUBJECTIVE & OBJECTIVE
Interval History: having nausea but less    Review of Systems   Gastrointestinal:  Positive for nausea. Negative for abdominal pain.   Neurological:  Negative for seizures and syncope.     Objective:     Vital Signs (Most Recent):  Temp: 99.4 °F (37.4 °C) (04/07/24 1149)  Pulse: (!) 116 (04/07/24 1149)  Resp: 18 (04/07/24 1149)  BP: 106/60 (04/07/24 1149)  SpO2: 99 % (04/07/24 1149) Vital Signs (24h Range):  Temp:  [98.7 °F (37.1 °C)-100.8 °F (38.2 °C)] 99.4 °F (37.4 °C)  Pulse:  [105-118] 116  Resp:  [18-20] 18  SpO2:  [98 %-100 %] 99 %  BP: (106-122)/(53-73) 106/60     Weight: 74.4 kg (164 lb)  Body mass index is 28.15 kg/m².    Intake/Output Summary (Last 24 hours) at 4/7/2024 1551  Last data filed at 4/7/2024 1030  Gross per 24 hour   Intake 600 ml   Output --   Net 600 ml         Physical Exam  Vitals and nursing note reviewed.   Constitutional:       General: She is in acute distress.      Appearance: She is well-developed. She is not toxic-appearing or diaphoretic.      Interventions: She is not intubated.  Pulmonary:      Effort: Pulmonary effort is normal. No accessory muscle usage or respiratory distress. She is not intubated.   Neurological:      Mental Status: She is alert and oriented to person, place, and time. Mental status is at baseline.      Motor: No seizure activity.   Psychiatric:         Attention and Perception: Attention normal.         Mood and Affect: Mood and affect normal.         Behavior: Behavior is cooperative.             Significant Labs: All pertinent labs within the past 24 hours have been reviewed.    Significant Imaging: I have reviewed all pertinent imaging results/findings within the past 24 hours.

## 2024-04-07 NOTE — PLAN OF CARE
Problem: Adult Inpatient Plan of Care  Goal: Plan of Care Review  Outcome: Ongoing, Progressing  Goal: Patient-Specific Goal (Individualized)  Outcome: Ongoing, Progressing  Goal: Absence of Hospital-Acquired Illness or Injury  Outcome: Ongoing, Progressing  Goal: Optimal Comfort and Wellbeing  Outcome: Ongoing, Progressing  Goal: Readiness for Transition of Care  Outcome: Ongoing, Progressing     Problem: Adjustment to Illness (Sepsis/Septic Shock)  Goal: Optimal Coping  Outcome: Ongoing, Progressing     Problem: Bleeding (Sepsis/Septic Shock)  Goal: Absence of Bleeding  Outcome: Ongoing, Progressing  Intervention: Monitor and Manage Bleeding  Flowsheets (Taken 4/7/2024 1719)  Bleeding Precautions: blood pressure closely monitored     Problem: Infection Progression (Sepsis/Septic Shock)  Goal: Absence of Infection Signs and Symptoms  Outcome: Ongoing, Progressing  Intervention: Initiate Sepsis Management  Flowsheets (Taken 4/7/2024 1719)  Infection Management: aseptic technique maintained  Intervention: Promote Stabilization  Flowsheets (Taken 4/7/2024 1719)  Fever Reduction/Comfort Measures:   lightweight bedding   lightweight clothing  Fluid/Electrolyte Management: fluids provided     Problem: Nutrition Impaired (Sepsis/Septic Shock)  Goal: Optimal Nutrition Intake  Outcome: Ongoing, Progressing     POC reviewed. Address questions and concerns. AAOX4. VVS. Afebrile. No acute changes. Free from falls or injuries. No complaint of discomfort or pain. Call light in reach. [facility]

## 2024-04-07 NOTE — ASSESSMENT & PLAN NOTE
Nausea & vomiting   Has frequent UTIs. Giving ceftriaxone and vancomycin for now. Symptoms improving. Follow up urine culture results.

## 2024-04-07 NOTE — ASSESSMENT & PLAN NOTE
Labs suggest anemia of inflammation and B12 deficiency. Treat infection and recheck iron studies outpatient. Give vitamin B12.

## 2024-04-08 VITALS
OXYGEN SATURATION: 100 % | BODY MASS INDEX: 28.15 KG/M2 | WEIGHT: 164 LBS | DIASTOLIC BLOOD PRESSURE: 83 MMHG | HEART RATE: 104 BPM | SYSTOLIC BLOOD PRESSURE: 135 MMHG | RESPIRATION RATE: 18 BRPM | TEMPERATURE: 99 F

## 2024-04-08 PROBLEM — A41.9 SEPSIS SECONDARY TO UTI: Status: RESOLVED | Noted: 2024-04-06 | Resolved: 2024-04-08

## 2024-04-08 PROBLEM — R11.2 NAUSEA & VOMITING: Status: RESOLVED | Noted: 2024-04-06 | Resolved: 2024-04-08

## 2024-04-08 PROBLEM — N39.0 SEPSIS SECONDARY TO UTI: Status: RESOLVED | Noted: 2024-04-06 | Resolved: 2024-04-08

## 2024-04-08 PROBLEM — A49.9 BACTERIAL UTI: Status: ACTIVE | Noted: 2023-05-28

## 2024-04-08 LAB — BACTERIA UR CULT: NORMAL

## 2024-04-08 PROCEDURE — 63600175 PHARM REV CODE 636 W HCPCS: Performed by: HOSPITALIST

## 2024-04-08 PROCEDURE — 25000003 PHARM REV CODE 250: Performed by: HOSPITALIST

## 2024-04-08 RX ORDER — AMOXICILLIN AND CLAVULANATE POTASSIUM 500; 125 MG/1; MG/1
1 TABLET, FILM COATED ORAL 2 TIMES DAILY
Qty: 6 TABLET | Refills: 0 | Status: SHIPPED | OUTPATIENT
Start: 2024-04-08 | End: 2024-04-12

## 2024-04-08 RX ORDER — MYCOPHENOLATE MOFETIL 250 MG/1
750 CAPSULE ORAL 2 TIMES DAILY
Qty: 180 CAPSULE | Refills: 11
Start: 2024-04-08 | End: 2025-04-08

## 2024-04-08 RX ORDER — CYANOCOBALAMIN (VITAMIN B-12) 250 MCG
250 TABLET ORAL DAILY
Qty: 30 TABLET | Refills: 1 | Status: SHIPPED | OUTPATIENT
Start: 2024-04-09 | End: 2024-06-14

## 2024-04-08 RX ADMIN — VANCOMYCIN HYDROCHLORIDE 750 MG: 750 INJECTION, POWDER, LYOPHILIZED, FOR SOLUTION INTRAVENOUS at 10:04

## 2024-04-08 RX ADMIN — PREDNISONE 5 MG: 5 TABLET ORAL at 08:04

## 2024-04-08 RX ADMIN — SODIUM BICARBONATE 650 MG TABLET 1300 MG: at 08:04

## 2024-04-08 RX ADMIN — CYANOCOBALAMIN TAB 250 MCG 250 MCG: 250 TAB at 08:04

## 2024-04-08 RX ADMIN — CEFTRIAXONE 1 G: 1 INJECTION, POWDER, FOR SOLUTION INTRAMUSCULAR; INTRAVENOUS at 08:04

## 2024-04-08 RX ADMIN — TACROLIMUS 4 MG: 1 CAPSULE ORAL at 08:04

## 2024-04-08 NOTE — PLAN OF CARE
[facility] - Telemetry Stepdown  Discharge Final Note    Primary Care Provider: Tavo Koo MD    Expected Discharge Date: 4/8/2024      Future Appointments   Date Time Provider Department Center   4/16/2024 10:40 AM Tavo Koo MD [facility] IM [facility] PCW   4/29/2024  7:55 AM LAB, TRANSPLANT NOM LAB[facility]   4/29/2024  8:00 AM SPECIMEN LAB, TRANSPLANT [facility]OM[facility] LABTX [facility]   4/29/2024  9:15 AM Mikel Charles MD [facility] ORTHO [facility] Ort   5/6/2024  9:30 AM Soraida Hollis NP [facility] KIDNTX [facility]   5/27/2024  1:30 PM Oswald Kruger MD [facility] NEPHRO [facility]          Final Discharge Note (most recent)       Final Note - 04/08/24 1335          Final Note    Assessment Type Final Discharge Note     Anticipated Discharge Disposition Home or Self Care     What phone number can be called within the next 1-3 days to see how you are doing after discharge? [phone]     Hospital Resources/Appts/Education Provided Appointments scheduled and added to AVS        Post-Acute Status    Post-Acute Authorization Other     Other Status No Post-Acute Service Needs                     Important Message from Medicare             Contact Info       Tavo Koo MD   Specialty: Family Medicine, Sports Medicine   Relationship: PCP - General    [address]  [facility] [address]   Phone: [phone]       Next Steps: Follow up    Instructions: As needed, If symptoms worsen            Zee Summers RN  Ext [phone]

## 2024-04-08 NOTE — PLAN OF CARE
Problem: Adult Inpatient Plan of Care  Goal: Plan of Care Review  Outcome: Met  Goal: Patient-Specific Goal (Individualized)  Outcome: Met  Goal: Absence of Hospital-Acquired Illness or Injury  Outcome: Met  Goal: Optimal Comfort and Wellbeing  Outcome: Met  Goal: Readiness for Transition of Care  Outcome: Met     Problem: Diabetes Comorbidity  Goal: Blood Glucose Level Within Targeted Range  Outcome: Met     Problem: Adjustment to Illness (Sepsis/Septic Shock)  Goal: Optimal Coping  Outcome: Met     Problem: Bleeding (Sepsis/Septic Shock)  Goal: Absence of Bleeding  Outcome: Met     Problem: Glycemic Control Impaired (Sepsis/Septic Shock)  Goal: Blood Glucose Level Within Desired Range  Outcome: Met     Problem: Infection Progression (Sepsis/Septic Shock)  Goal: Absence of Infection Signs and Symptoms  Outcome: Met     Problem: Nutrition Impaired (Sepsis/Septic Shock)  Goal: Optimal Nutrition Intake  Outcome: Met

## 2024-04-08 NOTE — PLAN OF CARE
Problem: Adult Inpatient Plan of Care  Goal: Plan of Care Review  Outcome: Ongoing, Progressing  Goal: Patient-Specific Goal (Individualized)  Outcome: Ongoing, Progressing  Goal: Absence of Hospital-Acquired Illness or Injury  Outcome: Ongoing, Progressing  Goal: Optimal Comfort and Wellbeing  Outcome: Ongoing, Progressing  Goal: Readiness for Transition of Care  Outcome: Ongoing, Progressing     Problem: Adjustment to Illness (Sepsis/Septic Shock)  Goal: Optimal Coping  Outcome: Ongoing, Progressing  Patient in bed. Rested well. VVS. No complaints voiced. NADN at this time. Safety measures in place. Call light in reach.

## 2024-04-08 NOTE — DISCHARGE SUMMARY
[facility]  Discharge Summary      Patient Name: Isabela Read  MRN: [id number]  SAMMIE: [id number]  Patient Class: IP- Inpatient  Admission Date: 2024  Hospital Length of Stay: 2 days  Discharge Date and Time: 2024  2:43 PM  Attending Physician: Adam Saunders MD   Discharging Provider: Adam Saunders MD  Primary Care Provider: Tavo Koo MD  Hospital Medicine Team: [facility] HOSP MED R Adam Saunders MD  Primary Care Team: [facility] MED R    HPI:   Isabela Read is a 29 year old Black woman with history of diabetes mellitus type 1 with retinopathy, nephrotic syndrome, gastroparesis, and Charcot arthropathy, history of  donor pancreas and kidney transplant on 2022 (immunosuppressed on tacrolimus, mycophenolate, and prednisone), metabolic acidosis, heart failure with preserved ejection fraction, interstitial pulmonary disease, history of E coli bacteremia on 2023. She lives in [facility]. Her primary care physician is Dr. Tavo Koo.    She presented to [facility] on 2024 with fever, nausea, and vomiting that started the day before. She also had urinary frequency and burning with urination that occurred at the same time but improved. She had been hospitalized for urinary tract infections in January, May, and 2023.    In the emergency department, temperature was as high as 100.1° Fahrenheit, heart rate was 118, blood pressures were normal. Labs showed WBC 16418/uL, hemoglobin and hematocrit 9.2 g/dL and 29.5% (decreased from 12.1 and 39.9 on 2024), bicarbonate 16 mmol/L (stable from 15 on 2024). X-ray showed no acute findings. Urine was unable to be obtained for urinalysis. She was given acetaminophen, 1 liter of lactated Ringer's solution, ondansetron, piperacillin-tazobactam, vancomycin. She was admitted to Hospital Medicine Team R.    * No surgery found *      [facility]  Course:   Qualitative PCR test for COVID-19, influenza, and RSV was negative. She was put on vancomycin and ceftriaxone. Urinalysis showed 1+ protein and 87 WBC/hpf. Labs showed low iron, TIBC, and transferrin with elevated ferritin. Vitamin B12 was relatively low. Urine culture had no growth but she improved on antibiotics. She was prescribed 3 days of amoxicillin-clavulanate. She was prescribed cyanocobalamin.     Goals of Care Treatment Preferences:  Code Status: Full Code      Consults:   Consults (From admission, onward)          Status Ordering Provider     Inpatient consult to Midline team  Once        Provider:  (Not yet assigned)    Completed CLYDE PRADO     Pharmacy to dose Vancomycin consult  Once        Provider:  (Not yet assigned)   See [facility] for full Linked Orders Report.    Acknowledged CLYDE PRADO            Final Active Diagnoses:    Diagnosis Date Noted POA    Normocytic anemia [D64.9] 2024 Yes    Bacterial UTI [N39.0, A49.9] 2023 Yes    Interstitial pulmonary disease, unspecified [J84.9] 2023 Yes     Chronic    Long term systemic steroid user [Z79.52] 2023 Not Applicable     Chronic    Long-term use of immunosuppressant medication [Z79.60] 2022 Not Applicable     Chronic    Status post -donor simultaneous pancreas kidney transplantation [Z94.0] 2022 Not Applicable     Chronic    (HFpEF) heart failure with preserved ejection fraction [I50.30] 2022 Yes     Chronic      Problems Resolved During this Admission:    Diagnosis Date Noted Date Resolved POA    PRINCIPAL PROBLEM:  Sepsis secondary to UTI [A41.9, N39.0] 2024 Yes    Nausea & vomiting [R11.2] 2024 Yes       Discharged Condition: good    Disposition: Home or Self Care    Follow Up:   Follow-up Information       Tavo Koo MD Follow up.    Specialties: Family Medicine, Sports Medicine  Why: As needed, If symptoms worsen  Contact  information:  [address]  [facility] [address]  [phone]                           Patient Instructions:      Diet Adult Regular     Notify your health care provider if you experience any of the following:  persistent nausea and vomiting or diarrhea     Activity as tolerated       Significant Diagnostic Studies:   X-Ray Chest AP Portable 4/06/24: FINDINGS:   No consolidation, pleural effusion or pneumothorax.   Cardiomediastinal silhouette is unremarkable.   Impression:  No acute findings in the chest.     Medications:  Reconciled Home Medications:      Medication List        START taking these medications      amoxicillin-clavulanate 500-125mg 500-125 mg Tab  Commonly known as: AUGMENTIN  Take 1 tablet (500 mg total) by mouth 2 (two) times daily. for 3 days     cyanocobalamin 250 MCG tablet  Commonly known as: VITAMIN B-12  Take 1 tablet (250 mcg total) by mouth once daily.  Start taking on: April 9, 2024            CHANGE how you take these medications      mycophenolate 250 mg Cap  Commonly known as: CELLCEPT  Take 3 capsules (750 mg total) by mouth 2 (two) times daily. Resume after finishing antibiotic  What changed: additional instructions            CONTINUE taking these medications      ketoconazole 2 % cream  Commonly known as: NIZORAL  Apply topically 2 (two) times daily as needed for dry areas of face     medroxyPROGESTERone 150 mg/mL Syrg  Commonly known as: DEPO-PROVERA  Inject 1 mL (150 mg total) into the muscle every 3 (three) months.     predniSONE 5 MG tablet  Commonly known as: DELTASONE  Take 1 tablet (5 mg total) by mouth once daily.     promethazine 12.5 MG Tab  Commonly known as: PHENERGAN  Take 1 tablet (12.5 mg total) by mouth every 6 (six) hours as needed (nausea).     sodium bicarbonate 650 MG tablet  Take 2 tablets (1,300 mg total) by mouth 3 (three) times daily.     tacrolimus 1 MG Cap  Commonly known as: PROGRAF  Take 4 capsules (4 mg total) by mouth every 12 (twelve) hours.      * tretinoin 0.05 % cream  Commonly known as: RETIN-A  Apply topically every evening. Start with every other night and move up to nightly after 2 weeks if not too dry.     * tretinoin 0.1 % cream  Commonly known as: RETIN-A  Apply topically every evening. Increase Retin A to 0.1%.  Rotate this with old one until weaker strength runs out and then go nightly with 0.1%.           * This list has 2 medication(s) that are the same as other medications prescribed for you. Read the directions carefully, and ask your doctor or other care provider to review them with you.                  Indwelling Lines/Drains at time of discharge:   Lines/Drains/Airways       Drain  Duration                  Hemodialysis AV Fistula Left upper arm -- days                    Time spent on the discharge of patient: 35 minutes         Adam Saunders MD  Department of Hospital Medicine  [facility] - Telemetry Stepdown

## 2024-04-09 ENCOUNTER — PATIENT OUTREACH (OUTPATIENT)
Dept: ADMINISTRATIVE | Facility: CLINIC | Age: 29
End: 2024-04-09
Payer: MEDICARE

## 2024-04-09 NOTE — PROGRESS NOTES
C3 nurse attempted to contact Isabela Read for a TCC post hospital discharge follow up call. No answer, left voicemail with callback information. Pts mother RCB with the pt and verified her number.    The patient has a scheduled HOSFU with her PCP, Tavo Koo MD on 4/16/24 at 1040. No messages routed at this time.

## 2024-04-10 ENCOUNTER — PATIENT MESSAGE (OUTPATIENT)
Dept: ADMINISTRATIVE | Facility: CLINIC | Age: 29
End: 2024-04-10
Payer: MEDICARE

## 2024-04-11 LAB
BACTERIA BLD CULT: NORMAL
BACTERIA BLD CULT: NORMAL

## 2024-04-29 ENCOUNTER — LAB VISIT (OUTPATIENT)
Dept: LAB | Facility: HOSPITAL | Age: 29
End: 2024-04-29
Attending: INTERNAL MEDICINE
Payer: MEDICARE

## 2024-04-29 DIAGNOSIS — E10.29 TYPE 1 DIABETES MELLITUS WITH OTHER DIABETIC KIDNEY COMPLICATION: ICD-10-CM

## 2024-04-29 DIAGNOSIS — Z94.0 KIDNEY REPLACED BY TRANSPLANT: ICD-10-CM

## 2024-04-29 DIAGNOSIS — Z94.83 STATUS POST PANCREAS TRANSPLANTATION: ICD-10-CM

## 2024-04-29 DIAGNOSIS — Z94.83 STATUS POST PANCREAS TRANSPLANTATION: Primary | ICD-10-CM

## 2024-04-29 LAB
ALBUMIN SERPL BCP-MCNC: 4.3 G/DL (ref 3.5–5.2)
ALBUMIN SERPL BCP-MCNC: 4.3 G/DL (ref 3.5–5.2)
ALP SERPL-CCNC: 72 U/L (ref 55–135)
ALT SERPL W/O P-5'-P-CCNC: 32 U/L (ref 10–44)
AMYLASE SERPL-CCNC: 113 U/L (ref 20–110)
ANION GAP SERPL CALC-SCNC: 7 MMOL/L (ref 8–16)
AST SERPL-CCNC: 20 U/L (ref 10–40)
BASOPHILS # BLD AUTO: 0.02 K/UL (ref 0–0.2)
BASOPHILS NFR BLD: 0.4 % (ref 0–1.9)
BILIRUB DIRECT SERPL-MCNC: 0.2 MG/DL (ref 0.1–0.3)
BILIRUB SERPL-MCNC: 0.4 MG/DL (ref 0.1–1)
BUN SERPL-MCNC: 27 MG/DL (ref 6–20)
C PEPTIDE SERPL-MCNC: 4.52 NG/ML (ref 0.78–5.19)
CALCIUM SERPL-MCNC: 10.4 MG/DL (ref 8.7–10.5)
CHLORIDE SERPL-SCNC: 114 MMOL/L (ref 95–110)
CO2 SERPL-SCNC: 17 MMOL/L (ref 23–29)
CREAT SERPL-MCNC: 1.1 MG/DL (ref 0.5–1.4)
DIFFERENTIAL METHOD BLD: ABNORMAL
EOSINOPHIL # BLD AUTO: 0.1 K/UL (ref 0–0.5)
EOSINOPHIL NFR BLD: 2 % (ref 0–8)
ERYTHROCYTE [DISTWIDTH] IN BLOOD BY AUTOMATED COUNT: 15.1 % (ref 11.5–14.5)
EST. GFR  (NO RACE VARIABLE): >60 ML/MIN/1.73 M^2
ESTIMATED AVG GLUCOSE: 103 MG/DL (ref 68–131)
GLUCOSE SERPL-MCNC: 80 MG/DL (ref 70–110)
HBA1C MFR BLD: 5.2 % (ref 4–5.6)
HCT VFR BLD AUTO: 37.8 % (ref 37–48.5)
HGB BLD-MCNC: 11.4 G/DL (ref 12–16)
IMM GRANULOCYTES # BLD AUTO: 0.01 K/UL (ref 0–0.04)
IMM GRANULOCYTES NFR BLD AUTO: 0.2 % (ref 0–0.5)
LIPASE SERPL-CCNC: 26 U/L (ref 4–60)
LYMPHOCYTES # BLD AUTO: 1.6 K/UL (ref 1–4.8)
LYMPHOCYTES NFR BLD: 32 % (ref 18–48)
MCH RBC QN AUTO: 29.3 PG (ref 27–31)
MCHC RBC AUTO-ENTMCNC: 30.2 G/DL (ref 32–36)
MCV RBC AUTO: 97 FL (ref 82–98)
MONOCYTES # BLD AUTO: 0.5 K/UL (ref 0.3–1)
MONOCYTES NFR BLD: 9.6 % (ref 4–15)
NEUTROPHILS # BLD AUTO: 2.8 K/UL (ref 1.8–7.7)
NEUTROPHILS NFR BLD: 55.8 % (ref 38–73)
NRBC BLD-RTO: 0 /100 WBC
PHOSPHATE SERPL-MCNC: 3.8 MG/DL (ref 2.7–4.5)
PLATELET # BLD AUTO: 318 K/UL (ref 150–450)
PMV BLD AUTO: 11 FL (ref 9.2–12.9)
POTASSIUM SERPL-SCNC: 4.4 MMOL/L (ref 3.5–5.1)
PROT SERPL-MCNC: 8.3 G/DL (ref 6–8.4)
RBC # BLD AUTO: 3.89 M/UL (ref 4–5.4)
SODIUM SERPL-SCNC: 138 MMOL/L (ref 136–145)
TACROLIMUS BLD-MCNC: 8.5 NG/ML (ref 5–15)
WBC # BLD AUTO: 5 K/UL (ref 3.9–12.7)

## 2024-04-29 PROCEDURE — 83690 ASSAY OF LIPASE: CPT | Performed by: INTERNAL MEDICINE

## 2024-04-29 PROCEDURE — 80197 ASSAY OF TACROLIMUS: CPT | Performed by: INTERNAL MEDICINE

## 2024-04-29 PROCEDURE — 80069 RENAL FUNCTION PANEL: CPT | Performed by: INTERNAL MEDICINE

## 2024-04-29 PROCEDURE — 83036 HEMOGLOBIN GLYCOSYLATED A1C: CPT | Performed by: INTERNAL MEDICINE

## 2024-04-29 PROCEDURE — 82150 ASSAY OF AMYLASE: CPT | Performed by: INTERNAL MEDICINE

## 2024-04-29 PROCEDURE — 87799 DETECT AGENT NOS DNA QUANT: CPT | Performed by: INTERNAL MEDICINE

## 2024-04-29 PROCEDURE — 84681 ASSAY OF C-PEPTIDE: CPT | Performed by: INTERNAL MEDICINE

## 2024-04-29 PROCEDURE — 84155 ASSAY OF PROTEIN SERUM: CPT | Performed by: INTERNAL MEDICINE

## 2024-04-29 PROCEDURE — 85025 COMPLETE CBC W/AUTO DIFF WBC: CPT | Performed by: INTERNAL MEDICINE

## 2024-04-30 ENCOUNTER — PATIENT MESSAGE (OUTPATIENT)
Dept: TRANSPLANT | Facility: CLINIC | Age: 29
End: 2024-04-30
Payer: MEDICARE

## 2024-04-30 PROBLEM — D63.1 ANEMIA IN ESRD (END-STAGE RENAL DISEASE): Status: RESOLVED | Noted: 2020-07-29 | Resolved: 2024-04-30

## 2024-04-30 PROBLEM — Z79.60 LONG-TERM USE OF IMMUNOSUPPRESSANT MEDICATION: Chronic | Status: RESOLVED | Noted: 2022-11-07 | Resolved: 2024-04-30

## 2024-04-30 PROBLEM — D84.821 IMMUNODEFICIENCY DUE TO LONG TERM IMMUNOSUPPRESSIVE DRUG THERAPY: Status: ACTIVE | Noted: 2022-11-26

## 2024-04-30 PROBLEM — T45.1X5A IMMUNODEFICIENCY DUE TO LONG TERM IMMUNOSUPPRESSIVE DRUG THERAPY: Status: ACTIVE | Noted: 2022-11-26

## 2024-04-30 PROBLEM — D50.9 IRON DEFICIENCY ANEMIA: Status: RESOLVED | Noted: 2020-08-20 | Resolved: 2024-04-30

## 2024-04-30 PROBLEM — N18.6 ANEMIA IN ESRD (END-STAGE RENAL DISEASE): Status: RESOLVED | Noted: 2020-07-29 | Resolved: 2024-04-30

## 2024-04-30 PROBLEM — Z79.60 IMMUNODEFICIENCY DUE TO LONG TERM IMMUNOSUPPRESSIVE DRUG THERAPY: Status: ACTIVE | Noted: 2022-11-26

## 2024-04-30 NOTE — PROGRESS NOTES
Kidney/Pancreas Post-Transplant Assessment    Referring Physician: Oswald Kruger  Current Nephrologist: Tai Camilo    ORGAN: PANCREAS  Donor Type: donation after brain death  PHS Increased Risk: no  Cold Ischemia: 310 mins  Induction Medications:     Subjective:     CC:  Reassessment of renal allograft function and management of chronic immunosuppression.    HPI:  Ms. Read is a 29 y.o. 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 Thymo induction). Post transplant course complicated by N/V/GEORGE with urinary retention s/p dc placement (s/p removal), CMV infection as well as early kidney biopsy concerning for ABMR with negative DSA treated with IVIG.  She has CKD stage 2 - GFR 60-89 and her baseline creatinine is between 0.9-1.0. She takes mycophenolate mofetil, prednisone, and tacrolimus for maintenance immunosuppression.      Recent hospitalizations or ER visits since her previous clinic visit.  4/6/24-4/8/24 Admitted Sepsis ; N/V, fever   Hospital Course:   Qualitative PCR test for COVID-19, influenza, and RSV was negative. She was put on vancomycin and ceftriaxone. Urinalysis showed 1+ protein and 87 WBC/hpf. Labs showed low iron, TIBC, and transferrin with elevated ferritin. Vitamin B12 was relatively low. Urine culture had no growth but she improved on antibiotics. She was prescribed 3 days of amoxicillin-clavulanate. She was prescribed cyanocobalamin.         Interval HX:  LOV 11/13/23   Scheduled 5/27/24 to re-established with general nephrology -Dr. Kruger.    5/1/24 Panc US: Satisfactory Doppler evaluation of the pancreatic allograft.     Intake-reports adequate water intake  UOP: no problems reported   Peripheral edema: denies     tolerating IS meds well    Reports chronic -frequent/intermittent nausea--takes phenergan PRN   BP  BP Readings from Last 3 Encounters:   05/06/24 104/68    04/08/24 135/83   02/05/24 137/73       Lab /diagnostic results reviewed with patient today.   All questions answered      Past Medical History:   Diagnosis Date    Acute cystitis without hematuria 11/25/2022    Acute kidney injury superimposed on chronic kidney disease 4/7/2021    Anemia     Anemia in ESRD (end-stage renal disease) 7/29/2020    Lab Results  Component Value Date   WBC 7.23 07/29/2020   HGB 7.1 (L) 07/29/2020   HCT 22.2 (L) 07/29/2020   MCV 91 07/29/2020    (H) 07/29/2020   Following with hematology and scheduled to undergo iron infusions    Bacteremia due to Escherichia coli 1/23/2023    Bilious vomiting with nausea 12/12/2022    Chronic hypertension with exacerbation during pregnancy in second trimester 11/6/2020    Current regimen (11/6/20):  - Carvedilol 12.5 mg BID - Nifedipine 30 mg daily Baseline CKD + proteinuria    Chronic kidney disease     Depression     Diabetes mellitus     Diabetic retinopathy     Diarrhea     Encounter for blood transfusion     End stage renal failure on dialysis     Fever 12/1/2022    Fever of undetermined origin 12/12/2022    Gastroparesis     Glaucoma     Hepatomegaly 4/29/2021    History of diabetes mellitus, type I 12/20/2022    Hx of psychiatric care     Hyperlipidemia     Hypertension     Nausea and vomiting 12/12/2022    Nephrotic syndrome     Nephrotic syndrome 3/4/2021    Nonspecific reaction to tuberculin skin test without active tuberculosis 10/1/2021    Orthostatic hypotension 1/25/2023    Palpitations     Poor fetal growth affecting management of mother in second trimester 10/15/2020    Pyelonephritis, acute 11/26/2022    Restrictive lung disease     Retinal detachment     Sepsis 12/1/2022    Sepsis 11/25/2022    Sepsis due to Escherichia coli 1/23/2023    Severe pre-eclampsia in second trimester 11/6/2020    Severe sepsis 11/25/2022    SIRS (systemic inflammatory response syndrome) 12/6/2022    Status post pancreas transplantation 11/26/2022     "11/2/2022    Type 1 diabetes mellitus with end-stage renal disease (ESRD) 2/24/2015    Followed by Dr. Mayo.  Last A1C was 13  Currently on lantus 20 units qAM and humolog 10 units with meals  - a fetal echocardiogram around 22-24 weeks - needs eye exam, podiatry exam  - needs EKG, maternal echo and fu with cardiology  - ASA 81mg, folic acid 4mg PO daily - hemoglobin A1c every 4-6 weeks -24 hour urine for protein and creatinine clearance should be performed. Patient will also need a       Review of Systems   Constitutional:  Negative for activity change, appetite change and fever.   HENT:  Negative for congestion, mouth sores and sore throat.    Eyes:  Positive for visual disturbance.   Respiratory:  Negative for cough, chest tightness and shortness of breath.    Cardiovascular:  Negative for chest pain, palpitations and leg swelling.   Gastrointestinal:  Positive for nausea (chronic-intermittent). Negative for abdominal distention, constipation and diarrhea.        HX gastroparesis    Genitourinary:  Negative for difficulty urinating, frequency and hematuria.   Musculoskeletal:  Negative for arthralgias, gait problem and myalgias.   Skin:  Negative for wound.   Allergic/Immunologic: Positive for immunocompromised state. Negative for environmental allergies and food allergies.   Neurological:  Negative for dizziness, weakness and numbness.   Psychiatric/Behavioral:  Negative for sleep disturbance. The patient is not nervous/anxious.        Objective:     Blood pressure 104/68, pulse 105, temperature 97.2 °F (36.2 °C), temperature source Tympanic, resp. rate 18, height 5' 4" (1.626 m), weight 74.3 kg (163 lb 12.8 oz), SpO2 99%.body mass index is 28.12 kg/m².    Physical Exam  Vitals and nursing note reviewed.   Constitutional:       Appearance: Normal appearance.   HENT:      Head: Normocephalic.   Cardiovascular:      Rate and Rhythm: Normal rate and regular rhythm.      Heart sounds: Normal heart sounds. "   Pulmonary:      Effort: Pulmonary effort is normal.      Breath sounds: Normal breath sounds.   Abdominal:      General: Bowel sounds are normal. There is no distension.      Palpations: Abdomen is soft.      Tenderness: There is no abdominal tenderness.   Musculoskeletal:         General: Normal range of motion.   Skin:     General: Skin is warm and dry.   Neurological:      General: No focal deficit present.      Mental Status: She is alert.   Psychiatric:         Behavior: Behavior normal.         Labs:  Lab Results   Component Value Date    WBC 5.00 2024    HGB 11.4 (L) 2024    HCT 37.8 2024     2024    K 4.4 2024     (H) 2024    CO2 17 (L) 2024    BUN 27 (H) 2024    CREATININE 1.1 2024    EGFRNORACEVR >60.0 2024    CALCIUM 10.4 2024    PHOS 3.8 2024    MG 1.6 2024    ALBUMIN 4.3 2024    ALBUMIN 4.3 2024    AST 20 2024    ALT 32 2024    UTPCR Unable to calculate 2024    .3 (H) 2022    TACROLIMUS 8.5 2024     Labs were reviewed with the patient.    Assessment:     1. Status post -donor simultaneous pancreas kidney transplantation    2. Immunodeficiency due to long term immunosuppressive drug therapy    3. Anemia due to chronic kidney disease, unspecified CKD stage    4. Renovascular hypertension    5. At risk for opportunistic infections    6. Secondary hyperparathyroidism        Plan:    Scheduled 24 to re-established with general nephrology -Dr. Kruger.     GI referral made for Gastroparesis/intermittent chronic nausea  1x courtesy  refill given for phenergan     1. Immunosuppression: Prograf trough 8.5. Continue Prograf 4/4,  mg BID, and Prednisone 5 mg QD. Will continue to monitor for drug toxicities    2. Allograft Function: Stable. Slight elevation in amylase but otherwise normal.  - history of DM1 s/p received a donation after brain death kidney  and pancreas transplant on 11/3/22 (0% PRA. Thymo induction).   - Post transplant course complicated by N/V/GEORGE with urinary retention s/p dc placement (s/p removal), recent CMV infection as well as early kidney biopsy concerning for ABMR with negative DSA treated with IVIG.    - baseline creatinine is between 0.9-1.0.   Lab Results   Component Value Date    CREATININE 1.1 04/29/2024       Latest Reference Range & Units 1yr 5mo,  Kidney,Pancreas-Post 2 Year  04/29/24   eGFR >60 mL/min/1.73 m^2 >60.0     Lab Results   Component Value Date    LIPASE 26 04/29/2024     Lab Results   Component Value Date    AMYLASE 113 (H) 04/29/2024     Lab Results   Component Value Date    HGBA1C 5.2 04/29/2024       3. Hypertension management: advise low salt diet and home BP monitoring    No meds    4. Metabolic Bone Disease/Secondary Hyperparathyroidism:  Lab Results   Component Value Date    .3 (H) 12/01/2022    CALCIUM 10.4 04/29/2024    CAION 1.02 (L) 11/03/2022    PHOS 3.8 04/29/2024       5. Electrolytes:  Will monitor /guidelines  Cont Sodium bicarb   Lab Results   Component Value Date     04/29/2024    K 4.4 04/29/2024     (H) 04/29/2024    CO2 17 (L) 04/29/2024       6. Anemia: stable. No need for intervention   Lab Results   Component Value Date    WBC 5.00 04/29/2024    HGB 11.4 (L) 04/29/2024    HCT 37.8 04/29/2024    MCV 97 04/29/2024     04/29/2024         7. Cytopenias: no significant cytopenias. Medicine list reviewed including potential causes of drug-induced cytopenias    8.  Proteinuria: continue p/c ratio as per guidelines    Latest Reference Range & Units 1yr 5mo,  Kidney,Pancreas-Post 2 Year  04/29/24   Prot/Creat Ratio, Urine 0.00 - 0.20  Unable to calculate       9. BK virus infection screening:  will continue to monitor per guidelines    10. Weight education: provided during the clinic visit   Body mass index is 28.12 kg/m².     11.Patient safety education regarding  immunosuppression including prophylaxis posttransplant for CMV, PCP : Education provided about vaccination and prevention of infections            Follow-up:   Clinic: return to transplant clinic weekly for the first month after transplant; every 2 weeks during months 2-3; then at 6-, 9-, 12-, 18-, 24-, and 36- months post-transplant to reassess for complications from immunosuppression toxicity and monitor for rejection.  Annually thereafter.    Labs: since patient remains at high risk for rejection and drug-related complications that warrant close monitoring, labs will be ordered as follows: continue twice weekly CBC, renal panel, and drug level for first month; then same labs once weekly through 3rd month post-transplant.  Urine for UA and protein/creatinine ratio monthly.  Serum BK - PCR at 1-, 3-, 6-, 9-, 12-, 18-, 24-, 36- 48-, and 60 months post-transplant.  Hepatic panel at 1-, 2-, 3-, 6-, 9-, 12-, 18-, 24-, and 36- months post-transplant.    Soraida Hollis, NP

## 2024-05-01 ENCOUNTER — PATIENT MESSAGE (OUTPATIENT)
Dept: TRANSPLANT | Facility: CLINIC | Age: 29
End: 2024-05-01
Payer: MEDICARE

## 2024-05-01 ENCOUNTER — HOSPITAL ENCOUNTER (OUTPATIENT)
Dept: RADIOLOGY | Facility: HOSPITAL | Age: 29
Discharge: HOME | End: 2024-05-01
Attending: INTERNAL MEDICINE
Payer: MEDICARE

## 2024-05-01 DIAGNOSIS — Z94.83 STATUS POST PANCREAS TRANSPLANTATION: ICD-10-CM

## 2024-05-01 PROCEDURE — 76705 ECHO EXAM OF ABDOMEN: CPT | Mod: TC,59

## 2024-05-01 PROCEDURE — 76705 ECHO EXAM OF ABDOMEN: CPT | Mod: 26,59,, | Performed by: RADIOLOGY

## 2024-05-01 PROCEDURE — 93976 VASCULAR STUDY: CPT | Mod: 26,,, | Performed by: RADIOLOGY

## 2024-05-01 PROCEDURE — 93976 VASCULAR STUDY: CPT | Mod: TC

## 2024-05-06 ENCOUNTER — OFFICE VISIT (OUTPATIENT)
Dept: TRANSPLANT | Facility: CLINIC | Age: 29
End: 2024-05-06
Payer: MEDICARE

## 2024-05-06 ENCOUNTER — PATIENT MESSAGE (OUTPATIENT)
Dept: TRANSPLANT | Facility: CLINIC | Age: 29
End: 2024-05-06

## 2024-05-06 VITALS
RESPIRATION RATE: 18 BRPM | BODY MASS INDEX: 27.96 KG/M2 | TEMPERATURE: 97 F | SYSTOLIC BLOOD PRESSURE: 104 MMHG | DIASTOLIC BLOOD PRESSURE: 68 MMHG | HEART RATE: 105 BPM | HEIGHT: 64 IN | OXYGEN SATURATION: 99 % | WEIGHT: 163.81 LBS

## 2024-05-06 DIAGNOSIS — I15.0 RENOVASCULAR HYPERTENSION: ICD-10-CM

## 2024-05-06 DIAGNOSIS — Z94.0 KIDNEY REPLACED BY TRANSPLANT: Primary | ICD-10-CM

## 2024-05-06 DIAGNOSIS — T45.1X5A IMMUNODEFICIENCY DUE TO LONG TERM IMMUNOSUPPRESSIVE DRUG THERAPY: ICD-10-CM

## 2024-05-06 DIAGNOSIS — N25.81 SECONDARY HYPERPARATHYROIDISM: ICD-10-CM

## 2024-05-06 DIAGNOSIS — K31.84 GASTROPARESIS: ICD-10-CM

## 2024-05-06 DIAGNOSIS — Z91.89 AT RISK FOR OPPORTUNISTIC INFECTIONS: ICD-10-CM

## 2024-05-06 DIAGNOSIS — R11.0 NAUSEA: ICD-10-CM

## 2024-05-06 DIAGNOSIS — D63.1 ANEMIA DUE TO CHRONIC KIDNEY DISEASE, UNSPECIFIED CKD STAGE: ICD-10-CM

## 2024-05-06 DIAGNOSIS — N18.9 ANEMIA DUE TO CHRONIC KIDNEY DISEASE, UNSPECIFIED CKD STAGE: ICD-10-CM

## 2024-05-06 DIAGNOSIS — E10.21 TYPE 1 DIABETES MELLITUS WITH DIABETIC NEPHROPATHY: ICD-10-CM

## 2024-05-06 DIAGNOSIS — Z94.0 STATUS POST DECEASED-DONOR KIDNEY TRANSPLANTATION: Primary | Chronic | ICD-10-CM

## 2024-05-06 DIAGNOSIS — Z94.83 STATUS POST PANCREAS TRANSPLANTATION: ICD-10-CM

## 2024-05-06 DIAGNOSIS — Z79.899 IMMUNODEFICIENCY DUE TO LONG TERM IMMUNOSUPPRESSIVE DRUG THERAPY: ICD-10-CM

## 2024-05-06 DIAGNOSIS — D84.821 IMMUNODEFICIENCY DUE TO LONG TERM IMMUNOSUPPRESSIVE DRUG THERAPY: ICD-10-CM

## 2024-05-06 PROCEDURE — 99215 OFFICE O/P EST HI 40 MIN: CPT | Mod: S$GLB,,, | Performed by: NURSE PRACTITIONER

## 2024-05-06 PROCEDURE — 3074F SYST BP LT 130 MM HG: CPT | Mod: CPTII,S$GLB,, | Performed by: NURSE PRACTITIONER

## 2024-05-06 PROCEDURE — 99999 PR PBB SHADOW E&M-EST. PATIENT-LVL IV: CPT | Mod: PBBFAC,,, | Performed by: NURSE PRACTITIONER

## 2024-05-06 PROCEDURE — 1111F DSCHRG MED/CURRENT MED MERGE: CPT | Mod: CPTII,S$GLB,, | Performed by: NURSE PRACTITIONER

## 2024-05-06 PROCEDURE — 1159F MED LIST DOCD IN RCRD: CPT | Mod: CPTII,S$GLB,, | Performed by: NURSE PRACTITIONER

## 2024-05-06 PROCEDURE — 3078F DIAST BP <80 MM HG: CPT | Mod: CPTII,S$GLB,, | Performed by: NURSE PRACTITIONER

## 2024-05-06 PROCEDURE — 3008F BODY MASS INDEX DOCD: CPT | Mod: CPTII,S$GLB,, | Performed by: NURSE PRACTITIONER

## 2024-05-06 PROCEDURE — 3044F HG A1C LEVEL LT 7.0%: CPT | Mod: CPTII,S$GLB,, | Performed by: NURSE PRACTITIONER

## 2024-05-06 RX ORDER — PROMETHAZINE HYDROCHLORIDE 12.5 MG/1
12.5 TABLET ORAL EVERY 6 HOURS PRN
Qty: 30 TABLET | Refills: 1 | Status: SHIPPED | OUTPATIENT
Start: 2024-05-06

## 2024-05-06 NOTE — LETTER
May 6, 2024        Tai Camilo  [address]  [facility] [address]  Phone: [phone]  Fax: [phone]             [facility] 1st Fl  [address]  [facility] [address]  Phone: [phone]   Patient: Isabela Read   MR Number: [id number]   YOB: 1995   Date of Visit: 5/6/2024       Dear Dr. Tai Camilo    Thank you for referring Isabela Read to me for evaluation. Attached you will find relevant portions of my assessment and plan of care.    If you have questions, please do not hesitate to call me. I look forward to following Isabela Read along with you.    Sincerely,    Soraida Hollis, NP    Enclosure    If you would like to receive this communication electronically, please contact [email or web address] or [phone] to request [facility] access.    [facility] [facility] is a tool which provides read-only access to select patient information with whom you have a relationship. Its easy to use and provides real time access to review your patients record including encounter summaries, notes, results, and demographic information.    If you feel you have received this communication in error or would no longer like to receive these types of communications, please e-mail [email or web address]

## 2024-05-10 ENCOUNTER — HOSPITAL ENCOUNTER (OUTPATIENT)
Dept: RADIOLOGY | Facility: HOSPITAL | Age: 29
Discharge: HOME | End: 2024-05-10
Attending: ORTHOPAEDIC SURGERY
Payer: MEDICARE

## 2024-05-10 ENCOUNTER — OFFICE VISIT (OUTPATIENT)
Dept: ORTHOPEDICS | Facility: CLINIC | Age: 29
End: 2024-05-10
Payer: MEDICARE

## 2024-05-10 DIAGNOSIS — S92.301D MULTIPLE CLOSED FRACTURES OF METATARSAL BONE OF RIGHT FOOT WITH ROUTINE HEALING, SUBSEQUENT ENCOUNTER: ICD-10-CM

## 2024-05-10 DIAGNOSIS — S92.301D MULTIPLE CLOSED FRACTURES OF METATARSAL BONE OF RIGHT FOOT WITH ROUTINE HEALING, SUBSEQUENT ENCOUNTER: Primary | ICD-10-CM

## 2024-05-10 DIAGNOSIS — M14.679 CHARCOT ARTHROPATHY OF MIDFOOT: ICD-10-CM

## 2024-05-10 PROCEDURE — 99213 OFFICE O/P EST LOW 20 MIN: CPT | Mod: S$GLB,,, | Performed by: ORTHOPAEDIC SURGERY

## 2024-05-10 PROCEDURE — 1160F RVW MEDS BY RX/DR IN RCRD: CPT | Mod: CPTII,S$GLB,, | Performed by: ORTHOPAEDIC SURGERY

## 2024-05-10 PROCEDURE — 1159F MED LIST DOCD IN RCRD: CPT | Mod: CPTII,S$GLB,, | Performed by: ORTHOPAEDIC SURGERY

## 2024-05-10 PROCEDURE — 73630 X-RAY EXAM OF FOOT: CPT | Mod: 26,RT,, | Performed by: RADIOLOGY

## 2024-05-10 PROCEDURE — 3044F HG A1C LEVEL LT 7.0%: CPT | Mod: CPTII,S$GLB,, | Performed by: ORTHOPAEDIC SURGERY

## 2024-05-10 PROCEDURE — 99999 PR PBB SHADOW E&M-EST. PATIENT-LVL II: CPT | Mod: PBBFAC,,, | Performed by: ORTHOPAEDIC SURGERY

## 2024-05-10 PROCEDURE — 73630 X-RAY EXAM OF FOOT: CPT | Mod: TC,RT

## 2024-05-10 NOTE — PROGRESS NOTES
Isabela Read  Returns today for follow-up.  This is a 29-year-old female with type 1 diabetes, peripheral neuropathy as well as a kidney pancreas transplant who sustained multiple metatarsal fractures doing sit-ups about three months ago.  I recommended close treatment due to her medical history.  Her last visit was on 03/28/2024 at which time the x-rays appear to be stable with regards to alignment and I felt there was new bone forming at the fracture sites.  She returns today and reports she continues to do well.  She reports minimal swelling at this time.  She has been doing some weight-bearing in the boot and has put on a shoe at home and has not had any increased swelling.    Examination:  The right foot reveals minimal swelling this morning.  There is no warmth or erythema There is no swelling about the ankle or hindfoot.  She has functional motion of her ankle and hindfoot.     Imaging:  I ordered and reviewed standing x-ray of the right foot today compared to the previous standing x-ray.  There has been no change in the alignment on the lateral or AP views of the foot.  There is further bony consolidation of the fracture sites.    Impression:  1. Multiple closed fractures of metatarsal bone of right foot with routine healing, subsequent encounter  X-Ray Foot Complete Right      2. Charcot arthropathy of midfoot          Recommendation:  She continues to heal radiographically and clinically.  I would let her to start bearing more weight and progressing into shoes as swelling allows.  She understands the need to be close attention to swelling as she does not have normal sensation and does not feel a lot of pain.    Follow-up in six weeks with standing x-ray right foot

## 2024-05-16 NOTE — ASSESSMENT & PLAN NOTE
- See long term use of immunosuppressant medication     This note was completed with dictation software and grammatical errors may exist.    Chief Complaint   Patient presents with    Leg Pain        HPI: Lloyd John Jr. is a 80 y.o. year old male patient who has a past medical history of Bladder cancer, Colon polyp, Glaucoma (increased eye pressure), HLD (hyperlipidemia), HTN (hypertension), and Prostate cancer. He presents in referral from No ref. provider found for back and left leg pain.  The patient returns in follow-up today for left back and left anterior thigh pain.  The patient had previously undergone an L2/3 microdiskectomy but then later had severe left back pain and left leg pain and an MRI demonstrated a new extrusion to the left side at that level.  We had done an injection and he did very well for about 2 months and the pain has gradually returned but is not as severe as previously.  Nonetheless he is taking gabapentin twice daily, was taking 3 times a day but it was causing drowsiness so he discontinued taking so often.  He reports that he has problems with standing and walking due to the pain, worse with sitting too long as well.  He denies any bowel or bladder incontinence, denies any migue weakness but has difficulty going up steps.      Previous history:  He is status post left L2/3 transforaminal injection on 02/29/2024 with 80% improvement.  He reports that the pain is no longer constant, comes and goes depending on activity.  He states that for the most part he is able to do all the activities that he wants to needs to be able to do.  He does not feel any weakness in the left leg.  He had followed up with Dr. Dallas who had noted improvement in strength in the left leg.  They had discussed possible laminectomy and fusion if he develops weakness.    Pain intervention history:He is status post left L2/3 transforaminal injection on 02/29/2024 with 80% improvement.     Spine surgeries:  L2/3 microdiskectomy    Antineuropathics:  Gabapentin  "300 3 times daily, is taking this more p.r.n. now  NSAIDs:  Ibuprofen  Physical therapy:  Antidepressants:  Muscle relaxers:  Opioids:  Antiplatelets/Anticoagulants:        ROS:  He reports back pain, joint stiffness, joint pain.  Balance of review of systems is negative    No results found for: "LABA1C", "HGBA1C"    Lab Results   Component Value Date    WBC 4.61 01/18/2024    HGB 15.3 01/18/2024    HCT 44.7 01/18/2024     (H) 01/18/2024     01/18/2024             Past Medical History:   Diagnosis Date    Bladder cancer     Followed by Dr. Craig in [place]    Colon polyp 2006    [facility] / [facility]    Glaucoma (increased eye pressure)     HLD (hyperlipidemia)     HTN (hypertension)     Prostate cancer        Past Surgical History:   Procedure Laterality Date    BACK SURGERY  08/2019    bone spur excision for sciatica tx    CATARACT EXTRACTION W/  INTRAOCULAR LENS IMPLANT Bilateral     COLONOSCOPY  2006  [facility]/    Polyps    COLONOSCOPY  4/5/2012  [facility]    Diverticulosis and spasms.  No polyps.    FLEXIBLE CYSTOSCOPY N/A 9/12/2022    Procedure: CYSTOSCOPY, FLEXIBLE;  Surgeon: Justo Luciano II, MD;  Location: [facility];  Service: Urology;  Laterality: N/A;    LAMINECTOMY USING MINIMALLY INVASIVE TECHNIQUE Left 6/13/2023    Procedure: LAMINECTOMY,MICRODISCECTOMY SPINE, MINIMALLY INVASIVE L2-L3;  Surgeon: Mike Dallas MD;  Location: [facility];  Service: Neurosurgery;  Laterality: Left;    TRANSFORAMINAL EPIDURAL INJECTION OF STEROID Left 2/29/2024    Procedure: Injection,steroid,epidural,transforaminal approach   L2/3;  Surgeon: Fritz Pal MD;  Location: [facility] OR;  Service: Pain Management;  Laterality: Left;    TRANSPERINEAL INSERTION OF PERIPROSTATIC GEL  5/8/2023    Procedure: TRANSPERINEAL INSERTION OF PERIPROSTATIC GEL;  Surgeon: Paulo Spaulding MD;  Location: [address];  Service: Urology;;    TRANSRECTAL BIOPSY OF PROSTATE WITH ULTRASOUND GUIDANCE N/A 9/12/2022    " Procedure: BIOPSY, PROSTATE, RECTAL APPROACH, WITH US GUIDANCE;  Surgeon: Justo Luciano II, MD;  Location: [facility];  Service: Urology;  Laterality: N/A;    TRANSRECTAL ULTRASOUND EXAMINATION  2023    Procedure: ULTRASOUND, RECTAL APPROACH;  Surgeon: Paulo Spaulding MD;  Location: [facility] OR [address] [place] [address];  Service: Urology;;    TRANSURETHRAL RESECTION OF BLADDER TUMOR      15 surgeries  (- current)    Urolift implant  2017       Social History     Socioeconomic History    Marital status:     Number of children: 2   Occupational History    Occupation:  for law firm   Tobacco Use    Smoking status: Former     Current packs/day: 0.00     Average packs/day: 0.5 packs/day for 3.0 years (1.5 ttl pk-yrs)     Types: Cigarettes     Start date: 1994     Quit date: 1997     Years since quittin.3    Smokeless tobacco: Never   Substance and Sexual Activity    Alcohol use: Yes     Alcohol/week: 1.7 standard drinks of alcohol     Types: 2 drink(s) per week     Comment: weekends    Drug use: No     Social Determinants of Health     Financial Resource Strain: Low Risk  (2024)    Overall Financial Resource Strain (CARDIA)     Difficulty of Paying Living Expenses: Not hard at all   Food Insecurity: No Food Insecurity (2024)    Hunger Vital Sign     Worried About Running Out of Food in the Last Year: Never true     Ran Out of Food in the Last Year: Never true   Transportation Needs: No Transportation Needs (2024)    PRAPARE - Transportation     Lack of Transportation (Medical): No     Lack of Transportation (Non-Medical): No   Physical Activity: Insufficiently Active (2024)    Exercise Vital Sign     Days of Exercise per Week: 2 days     Minutes of Exercise per Session: 20 min   Stress: No Stress Concern Present (2024)    [employer] [place] [employer] - Occupational Stress Questionnaire     Feeling of Stress : Only a little   Housing Stability: Low Risk   "(2024)    Housing Stability Vital Sign     Unable to Pay for Housing in the Last Year: No     Number of Places Lived in the Last Year: 1     Unstable Housing in the Last Year: No         Medications/Allergies: See med card    Vitals:    24 1027   BP: (!) 166/80   Pulse: (!) 59   Weight: 91.4 kg (201 lb 9.8 oz)   Height: 5' 11" (1.803 m)   PainSc:   6   PainLoc: Leg     Body mass index is 28.12 kg/m².      2024    10:28 AM 3/25/2024     8:38 AM 2021     8:27 AM   Last 3 PDI Scores   Pain Disability Index (PDI) 56 19 22     Physical exam:    Gen: A and O x3, pleasant, well-groomed  Skin: No rashes or obvious lesions  HEENT: PERRLA, no obvious deformities on ears or in canals. Trachea midline.  CVS: Regular rate and rhythm, normal palpable pulses.  Resp:No increased work of breathing, symmetrical chest rise.  Abdomen: Soft, NT/ND.  Musculoskeletal: Able to heel walk, toe walk. No antalgic gait.           Neuro:    Iliopsoas Quadriceps Knee  Flexion Tibialis  anterior Gastro- cnemius EHL   Lower: R 5/5 5/5 5/5 5/5 5/5 5/5    L 5/5 4/5 5/5 5/5 5/5 5/5      Left  Right    Patellar DTR 0+ 0+   Achilles DTR 0+ 0+   Trevino Absent  Absent   Clonus Absent Absent   Babinski Absent Absent     Intact and symmetrical to light touch and pinprick in L1-S1 dermatomes bilaterally.    Lumbar spine:  Lumbar spine: ROM is moderately reduced with flexion extension and oblique extension with some increased pain in the back and left anterior thigh.    Armand's test causes no increased pain on either side.    Supine straight leg raise is negative bilaterally.    Internal and external rotation of the hip causes no increased pain on either side.  Myofascial exam: No tenderness to palpation across lumbar paraspinous muscles.    Imagin24 MRI L-spine:  Morphology: Prominent new endplate centered marrow edema asymmetric to the left at L2-L3 in the setting of advanced degenerative disc height loss.  Associated " Schmorl's nodes but no apparent fractures.  Mild endplate centered marrow edema asymmetric to the right at L5-S1.  No apparent fractures.  Diffuse fatty marrow replacement.   Alignment: Sagittal alignment is within normal limits.  Mild broad dextrocurvature similar to the prior exam.   Cord: Normal in contour, caliber, and signal intensity.  Conus positioning is within normal limits.   Disc levels:   T12-L1: Shallow disc bulging and mild bilateral facet arthrosis.  The spinal canal and foramina remain patent.   L1-L2: Mild to moderate degenerative disc height loss with shallow disc bulging and mild bilateral facet arthrosis mildly narrowing the spinal canal and subarticular recesses, left greater than right as well as mild left foraminal narrowing.  The right foramen is patent.   L2-L3: Interval decompressive laminectomy and discectomy changes.  Presumed large recurrent left lateralizing disc extrusion with mild cranial migration severely narrowing the thecal sac with mass effect on multiple traversing nerve roots most notably the left subarticular L3 distribution.  Severe facet arthrosis.  There is progressive severe left and mild right foraminal narrowing.  There may be a component of regional scar tissue within the laminectomy bed although nonspecific on this noncontrast MRI exam.   L3-L4: Moderate degenerative disc height loss and desiccation with disc bulging and moderate facet arthrosis with ligamentum flavum thickening producing moderate narrowing of the spinal canal and left foramen.  Mild right foraminal narrowing.  This is similar to the prior exam.   L4-L5: Moderate degenerative disc height loss and desiccation with prominent disc bulging and moderate to severe bilateral facet arthrosis with ligamentum flavum thickening severely narrowing the spinal canal and subarticular recesses right greater than left and contributing to moderate right and mild left foraminal narrowing similar to the prior exam.    L5-S1: Mild degenerative disc height loss and desiccation with disc bulging and severe left/moderate right-sided facet arthrosis and ligamentum flavum thickening producing mild to moderate narrowing of the spinal canal and encroachment of both subarticular recesses as well as mild to moderate left and mild right foraminal narrowing.       Assessment:  Lloyd John Jr. is a 80 y.o. year old male patient who has a past medical history of Bladder cancer, Colon polyp, Glaucoma (increased eye pressure), HLD (hyperlipidemia), HTN (hypertension), and Prostate cancer. He presents in referral from No ref. provider found for back and left leg pain.     No diagnosis found.      Plan:  1.  We discussed that he does have some slight weakness in his quadriceps, we reviewed his MRI that shows L3 nerve root impingement.  I am going to get him set up for another left L2/3 transforaminal injection.  We discussed that if this helps long-term, we can repeat this as necessary.  If he is developing further weakness or he is not having improvement I would have him follow-up to see Dr. Dallas.

## 2024-05-23 ENCOUNTER — PATIENT MESSAGE (OUTPATIENT)
Dept: OPHTHALMOLOGY | Facility: CLINIC | Age: 29
End: 2024-05-23
Payer: MEDICARE

## 2024-05-27 ENCOUNTER — OFFICE VISIT (OUTPATIENT)
Dept: NEPHROLOGY | Facility: CLINIC | Age: 29
End: 2024-05-27
Payer: MEDICARE

## 2024-05-27 VITALS
WEIGHT: 163.81 LBS | OXYGEN SATURATION: 98 % | SYSTOLIC BLOOD PRESSURE: 100 MMHG | HEART RATE: 118 BPM | HEIGHT: 64 IN | DIASTOLIC BLOOD PRESSURE: 80 MMHG | BODY MASS INDEX: 27.96 KG/M2

## 2024-05-27 DIAGNOSIS — D84.821 IMMUNOSUPPRESSION DUE TO DRUG THERAPY: Primary | ICD-10-CM

## 2024-05-27 DIAGNOSIS — Z79.899 IMMUNOSUPPRESSION DUE TO DRUG THERAPY: Primary | ICD-10-CM

## 2024-05-27 DIAGNOSIS — E87.20 METABOLIC ACIDOSIS: ICD-10-CM

## 2024-05-27 DIAGNOSIS — Z94.0 KIDNEY TRANSPLANT STATUS: ICD-10-CM

## 2024-05-27 PROCEDURE — 3066F NEPHROPATHY DOC TX: CPT | Mod: CPTII,S$GLB,, | Performed by: INTERNAL MEDICINE

## 2024-05-27 PROCEDURE — 3074F SYST BP LT 130 MM HG: CPT | Mod: CPTII,S$GLB,, | Performed by: INTERNAL MEDICINE

## 2024-05-27 PROCEDURE — 1160F RVW MEDS BY RX/DR IN RCRD: CPT | Mod: CPTII,S$GLB,, | Performed by: INTERNAL MEDICINE

## 2024-05-27 PROCEDURE — 3079F DIAST BP 80-89 MM HG: CPT | Mod: CPTII,S$GLB,, | Performed by: INTERNAL MEDICINE

## 2024-05-27 PROCEDURE — 3008F BODY MASS INDEX DOCD: CPT | Mod: CPTII,S$GLB,, | Performed by: INTERNAL MEDICINE

## 2024-05-27 PROCEDURE — 1159F MED LIST DOCD IN RCRD: CPT | Mod: CPTII,S$GLB,, | Performed by: INTERNAL MEDICINE

## 2024-05-27 PROCEDURE — 99999 PR PBB SHADOW E&M-EST. PATIENT-LVL III: CPT | Mod: PBBFAC,,, | Performed by: INTERNAL MEDICINE

## 2024-05-27 PROCEDURE — 99214 OFFICE O/P EST MOD 30 MIN: CPT | Mod: S$GLB,,, | Performed by: INTERNAL MEDICINE

## 2024-05-27 PROCEDURE — 3044F HG A1C LEVEL LT 7.0%: CPT | Mod: CPTII,S$GLB,, | Performed by: INTERNAL MEDICINE

## 2024-05-27 NOTE — PROGRESS NOTES
REASON FOR CONSULT/CHIEF COMPLAIN: Kidney and pancrease transplant    REFERRING PHYSICIAN: Tavo Koo MD    HISTORY OF PRESENT ILLNESS: 29 y.o. female who is new to me, referred here for transplant  No chronic NSAID use, no known exposure to lithium, lead. No family history of Lupus or deafness. No ingestion of licorice. No smoking. Remote one episode of kidney stone incidentally found on X ray. 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 Kidney pancrease transplant in Nov 2022, had been admitted to the hospital 3-4 times so far for infection related complications.  Denied any issues with urination including dysuria, hematuria, urgency, hesitancy, nocturia, incomplete voiding. Denied chest pain, nausea, vomiting, abd pain, diarrhea, shortness of breath, pedal edema, Orthopnea, PND.    ROS:  General: negative for chills, or fatigue  Respiratory: No cough, shortness of breath, or wheezing  Cardiovascular: No chest pain or dyspnea   Gastrointestinal: No abdominal pain, change in bowel habits      MEDICAL PROBLEMS:  Past Medical History:   Diagnosis Date    Acute cystitis without hematuria 11/25/2022    Acute kidney injury superimposed on chronic kidney disease 4/7/2021    Anemia     Anemia in ESRD (end-stage renal disease) 7/29/2020    Lab Results  Component Value Date   WBC 7.23 07/29/2020   HGB 7.1 (L) 07/29/2020   HCT 22.2 (L) 07/29/2020   MCV 91 07/29/2020    (H) 07/29/2020   Following with hematology and scheduled to undergo iron infusions    Bacteremia due to Escherichia coli 1/23/2023    Bilious vomiting with nausea 12/12/2022    Chronic hypertension with exacerbation during pregnancy in second trimester 11/6/2020    Current regimen (11/6/20):  - Carvedilol 12.5 mg BID - Nifedipine 30 mg daily Baseline CKD + proteinuria    Chronic kidney disease     Depression     Diabetes mellitus     Diabetic retinopathy     Diarrhea     Encounter for blood transfusion     End  stage renal failure on dialysis     Fever 12/1/2022    Fever of undetermined origin 12/12/2022    Gastroparesis     Glaucoma     Hepatomegaly 4/29/2021    History of diabetes mellitus, type I 12/20/2022    Hx of psychiatric care     Hyperlipidemia     Hypertension     Nausea and vomiting 12/12/2022    Nephrotic syndrome     Nephrotic syndrome 3/4/2021    Nonspecific reaction to tuberculin skin test without active tuberculosis 10/1/2021    Orthostatic hypotension 1/25/2023    Palpitations     Poor fetal growth affecting management of mother in second trimester 10/15/2020    Pyelonephritis, acute 11/26/2022    Restrictive lung disease     Retinal detachment     Sepsis 12/1/2022    Sepsis 11/25/2022    Sepsis due to Escherichia coli 1/23/2023    Severe pre-eclampsia in second trimester 11/6/2020    Severe sepsis 11/25/2022    SIRS (systemic inflammatory response syndrome) 12/6/2022    Status post pancreas transplantation 11/26/2022 11/2/2022    Type 1 diabetes mellitus with end-stage renal disease (ESRD) 2/24/2015    Followed by Dr. Mayo.  Last A1C was 13  Currently on lantus 20 units qAM and humolog 10 units with meals  - a fetal echocardiogram around 22-24 weeks - needs eye exam, podiatry exam  - needs EKG, maternal echo and fu with cardiology  - ASA 81mg, folic acid 4mg PO daily - hemoglobin A1c every 4-6 weeks -24 hour urine for protein and creatinine clearance should be performed. Patient will also need a       SURGICAL PROBLEMS:  Past Surgical History:   Procedure Laterality Date    AV FISTULA PLACEMENT Left 04/07/2021    Procedure: CREATION, AV FISTULA;  Surgeon: Roberto Ryan MD;  Location: [facility] OR [address] [place] [address];  Service: Peripheral Vascular;  Laterality: Left;    COLONOSCOPY N/A 03/16/2022    Procedure: COLONOSCOPY;  Surgeon: Tavo Kwok MD;  Location: [facility] ENDO (4TH FLR);  Service: Endoscopy;  Laterality: N/A;  Questionable history of delayed gastric emptying longstanding diabetes now on eating  pre transplant workup for history of nausea vomiting which seems to have improved with dialysis also chronic diarrhea and history of anemia pre transplant workup for kidney transplant. 3    CYSTOSCOPY      ESOPHAGOGASTRODUODENOSCOPY N/A 03/16/2022    Procedure: EGD (ESOPHAGOGASTRODUODENOSCOPY);  Surgeon: Tavo Kwok MD;  Location: [facility] (4TH FLR);  Service: Endoscopy;  Laterality: N/A;  Questionable history of delayed gastric emptying longstanding diabetes now on eating pre transplant workup for history of nausea vomiting which seems to have improved with dialysis also chronic diarrhea and history of anemia pre transplant workup for    FISTULOGRAM N/A 08/11/2021    Procedure: Fistulogram;  Surgeon: Roberto Ryan MD;  Location: [facility] CATH LAB;  Service: Cardiology;  Laterality: N/A;    KIDNEY TRANSPLANT Right 11/02/2022    Procedure: TRANSPLANT, KIDNEY;  Surgeon: Kumar Rodriges Jr., MD;  Location: [facility] OR 2ND FLR;  Service: Transplant;  Laterality: Right;    LASER PHOTOCOAGULATION OF RETINA Right 05/31/2022    Procedure: PHOTOCOAGULATION, RETINA, USING LASER;  Surgeon: Maximilian Montaño MD;  Location: [facility] OR 1ST FLR;  Service: Ophthalmology;  Laterality: Right;    PERCUTANEOUS TRANSLUMINAL ANGIOPLASTY OF ARTERIOVENOUS FISTULA N/A 08/11/2021    Procedure: PTA, AV FISTULA;  Surgeon: Roberto Ryan MD;  Location: [facility] CATH LAB;  Service: Cardiology;  Laterality: N/A;    REMOVAL IMPLANT, POSTERIOR SEGMENT, INTRAOCULAR Right 02/01/2022    Procedure: REMOVAL IMPLANT, POSTERIOR SEGMENT, INTRAOCULAR;  Surgeon: Maximilian Montaño MD;  Location: [facility] 1ST FLR;  Service: Ophthalmology;  Laterality: Right;    REPAIR OF RETINAL DETACHMENT WITH VITRECTOMY Right 01/25/2022    Procedure: REPAIR, RETINAL DETACHMENT, WITH VITRECTOMY, MEMBRANE PEEL, LASER, INJECTION OF GAS VS OIL;  Surgeon: Maximilian Montaño MD;  [address]  Service: Ophthalmology;  Laterality: Right;    REPAIR,  RETINAL DETACHMENT, COMPLEX, WITH VITRECTOMY AND MEMBRANE PEELING Right 3/21/2023    Procedure: REPAIR,RETINAL DETACHMENT,COMPLEX,WITH VITRECTOMY AND MEMBRANE PEELING;  Surgeon: Maximilian Montaño MD;  Location: [facility] OR 1ST FLR;  Service: Ophthalmology;  Laterality: Right;  25ga    REVISION OF ARTERIOVENOUS FISTULA Left 12/10/2021    Procedure: REVISION, AV FISTULA with BVT;  Surgeon: Roberto Ryan MD;  Location: [facility] OR 2ND FLR;  Service: Peripheral Vascular;  Laterality: Left;    TRANSPLANTATION OF PANCREAS N/A 11/02/2022    Procedure: TRANSPLANT, PANCREAS;  Surgeon: Kumar Rodriges Jr., MD;  Location: [facility] OR 2ND FLR;  Service: Transplant;  Laterality: N/A;    VITRECTOMY BY PARS PLANA APPROACH Right 02/01/2022    Procedure: VITRECTOMY, PARS PLANA APPROACH;  Surgeon: Maximilian Montaño MD;  Location: [facility] OR 1ST FLR;  Service: Ophthalmology;  Laterality: Right;    VITRECTOMY BY PARS PLANA APPROACH Right 05/31/2022    Procedure: VITRECTOMY, PARS PLANA APPROACH;  Surgeon: Maximilian Montaño MD;  Location: [facility] OR 1ST FLR;  Service: Ophthalmology;  Laterality: Right;       FAMILY HISTORY:   Family History   Problem Relation Name Age of Onset    Hypertension Mother      Heart disease Father      Diabetes Brother      Celiac disease Neg Hx      Cirrhosis Neg Hx      Colon cancer Neg Hx      Colon polyps Neg Hx      Crohn's disease Neg Hx      Inflammatory bowel disease Neg Hx      Liver cancer Neg Hx      Liver disease Neg Hx      Rectal cancer Neg Hx      Stomach cancer Neg Hx      Ulcerative colitis Neg Hx      Esophageal cancer Neg Hx      Hemochromatosis Neg Hx      Pancreatic cancer Neg Hx      Kidney cancer Neg Hx      Bladder Cancer Neg Hx      Uterine cancer Neg Hx      Ovarian cancer Neg Hx         SOCIAL HISTORY:  Social History     Socioeconomic History    Marital status: Single   Occupational History    Occupation:  at [employer]   Tobacco Use    Smoking status: Never    Smokeless  tobacco: Never   Substance and Sexual Activity    Alcohol use: No    Drug use: No    Sexual activity: Not Currently     Partners: Male     Comment: monogamous   Social History Narrative    Caregiver        Single    No kids    Had Miscarriage  At 23 weeks from preeclampsia    Disabled     Social Determinants of Health     Financial Resource Strain: Low Risk  (4/7/2024)    Overall Financial Resource Strain (CARDIA)     Difficulty of Paying Living Expenses: Not hard at all   Food Insecurity: No Food Insecurity (4/7/2024)    Hunger Vital Sign     Worried About Running Out of Food in the Last Year: Never true     Ran Out of Food in the Last Year: Never true   Transportation Needs: No Transportation Needs (4/7/2024)    PRAPARE - Transportation     Lack of Transportation (Medical): No     Lack of Transportation (Non-Medical): No   Physical Activity: Sufficiently Active (4/7/2024)    Exercise Vital Sign     Days of Exercise per Week: 3 days     Minutes of Exercise per Session: 60 min   Stress: No Stress Concern Present (4/7/2024)    [employer] [place] [employer] [employer] - Occupational Stress Questionnaire     Feeling of Stress : Not at all   Housing Stability: Unknown (4/7/2024)    Housing Stability Vital Sign     Unable to Pay for Housing in the Last Year: No     Unstable Housing in the Last Year: No       ALLERGIES:  Review of patient's allergies indicates:  No Known Allergies    MEDICATIONS:    Current Outpatient Medications:     cyanocobalamin (VITAMIN B-12) 250 MCG tablet, Take 1 tablet (250 mcg total) by mouth once daily., Disp: 30 tablet, Rfl: 1    ketoconazole (NIZORAL) 2 % cream, Apply topically 2 (two) times daily as needed for dry areas of face, Disp: 60 g, Rfl: 1    medroxyPROGESTERone (DEPO-PROVERA) 150 mg/mL Syrg, Inject 1 mL (150 mg total) into the muscle every 3 (three) months., Disp: 1 mL, Rfl: 3    mycophenolate (CELLCEPT) 250 mg Cap, Take 3 capsules (750 mg total) by mouth 2 (two) times daily. Resume  after finishing antibiotic, Disp: 180 capsule, Rfl: 11    predniSONE (DELTASONE) 5 MG tablet, Take 1 tablet (5 mg total) by mouth once daily., Disp: 30 tablet, Rfl: 11    promethazine (PHENERGAN) 12.5 MG Tab, Take 1 tablet (12.5 mg total) by mouth every 6 (six) hours as needed (nausea)., Disp: 30 tablet, Rfl: 1    sodium bicarbonate 650 MG tablet, Take 2 tablets (1,300 mg total) by mouth 3 (three) times daily., Disp: 180 tablet, Rfl: 11    tacrolimus (PROGRAF) 1 MG Cap, Take 4 capsules (4 mg total) by mouth every 12 (twelve) hours., Disp: 240 capsule, Rfl: 11    tretinoin (RETIN-A) 0.05 % cream, Apply topically every evening. Start with every other night and move up to nightly after 2 weeks if not too dry., Disp: 20 g, Rfl: 1    tretinoin (RETIN-A) 0.1 % cream, Apply topically every evening. Increase Retin A to 0.1%.  Rotate this with old one until weaker strength runs out and then go nightly with 0.1%., Disp: 20 g, Rfl: 3   Medication List with Changes/Refills   Current Medications    CYANOCOBALAMIN (VITAMIN B-12) 250 MCG TABLET    Take 1 tablet (250 mcg total) by mouth once daily.    KETOCONAZOLE (NIZORAL) 2 % CREAM    Apply topically 2 (two) times daily as needed for dry areas of face    MEDROXYPROGESTERONE (DEPO-PROVERA) 150 MG/ML SYRG    Inject 1 mL (150 mg total) into the muscle every 3 (three) months.    MYCOPHENOLATE (CELLCEPT) 250 MG CAP    Take 3 capsules (750 mg total) by mouth 2 (two) times daily. Resume after finishing antibiotic    PREDNISONE (DELTASONE) 5 MG TABLET    Take 1 tablet (5 mg total) by mouth once daily.    PROMETHAZINE (PHENERGAN) 12.5 MG TAB    Take 1 tablet (12.5 mg total) by mouth every 6 (six) hours as needed (nausea).    SODIUM BICARBONATE 650 MG TABLET    Take 2 tablets (1,300 mg total) by mouth 3 (three) times daily.    TACROLIMUS (PROGRAF) 1 MG CAP    Take 4 capsules (4 mg total) by mouth every 12 (twelve) hours.    TRETINOIN (RETIN-A) 0.05 % CREAM    Apply topically every evening.  "Start with every other night and move up to nightly after 2 weeks if not too dry.    TRETINOIN (RETIN-A) 0.1 % CREAM    Apply topically every evening. Increase Retin A to 0.1%.  Rotate this with old one until weaker strength runs out and then go nightly with 0.1%.        PHYSICAL EXAM:  /80   Pulse (!) 118   Ht 5' 4" (1.626 m)   Wt 74.3 kg (163 lb 12.8 oz)   SpO2 98%   BMI 28.12 kg/m²     General: No distress, No fever or chills  Neck: No adenopathy,no carotid bruit,no JVD  Lungs:Clear to auscultation bilaterally, No Crackles  Heart: Regular rate and rhythm, no murmur, gallops or rubs  Abdomen: Soft, no tenderness, bowel sounds normal  Extremities: Atraumatic, no edema in LE  Skin: Turgor normal. No rashes  Neurologic: No focal weakness, oriented.  Dialysis Access: Non applicable.         LABS:  Lab Results   Component Value Date    HGB 11.4 (L) 04/29/2024        Lab Results   Component Value Date    CREATININE 1.1 04/29/2024       Prot/Creat Ratio, Urine   Date Value Ref Range Status   04/29/2024 Unable to calculate 0.00 - 0.20 Final   11/13/2023 0.06 0.00 - 0.20 Final   01/03/2023 0.33 (H) 0.00 - 0.20 Final       Lab Results   Component Value Date     04/29/2024    K 4.4 04/29/2024    CO2 17 (L) 04/29/2024       last PTH   Lab Results   Component Value Date    .3 (H) 12/01/2022    CALCIUM 10.4 04/29/2024    CAION 1.02 (L) 11/03/2022    PHOS 3.8 04/29/2024       Lab Results   Component Value Date    HGBA1C 5.2 04/29/2024       Lab Results   Component Value Date    LDLCALC 81.6 02/07/2023         Creatinine, Urine   Date Value Ref Range Status   04/29/2024 65.0 15.0 - 325.0 mg/dL Final   11/13/2023 156.0 15.0 - 325.0 mg/dL Final   01/03/2023 45.0 15.0 - 325.0 mg/dL Final       Protein, Urine   Date Value Ref Range Status   08/05/2020 503 (H) 0 - 15 mg/dL Final     Protein, Urine Random   Date Value Ref Range Status   04/29/2024 <7 0 - 15 mg/dL Final   11/13/2023 10 0 - 15 mg/dL Final "   01/03/2023 15 0 - 15 mg/dL Final       Prot/Creat Ratio, Urine   Date Value Ref Range Status   04/29/2024 Unable to calculate 0.00 - 0.20 Final   11/13/2023 0.06 0.00 - 0.20 Final   01/03/2023 0.33 (H) 0.00 - 0.20 Final         ASSESSMENT AND PLAN:    1) S/P Kidney Pancreas transplant   2) Immunosuppression due to drug therapy  3) Left arm AV fistula  4) Metabolic acidosis   See HPI. Current IS regimen is Prograf 4/4, Cellcept 500 mg BID, Tolerating well.   Electrolytes, Acid Base, Hemoglobin, Bone Mineral in acceptable range.    - Discussed cancer screening with PAP smears.  - No changes to her management at this time.  - Informed her she cannot get pregnant while on the current IS medications.        RTC in 4 months.    Diagnosis and plan of care explained to the patient.  Pt Verbalized understanding. Answered all questions.  Thanks for allowing me to participate in the care of this patient.     2:06 PM    Oswald Kruger MD  Nephrology & Critical Care

## 2024-06-06 ENCOUNTER — OFFICE VISIT (OUTPATIENT)
Dept: OPHTHALMOLOGY | Facility: CLINIC | Age: 29
End: 2024-06-06
Payer: MEDICARE

## 2024-06-06 DIAGNOSIS — Z98.890 SILICONE OIL IN EYE AFTER VITRECTOMY: ICD-10-CM

## 2024-06-06 DIAGNOSIS — H44.709 SILICONE OIL IN EYE AFTER VITRECTOMY: ICD-10-CM

## 2024-06-06 DIAGNOSIS — E10.3541: Primary | ICD-10-CM

## 2024-06-06 DIAGNOSIS — E10.3592 TYPE 1 DIABETES MELLITUS WITH PROLIFERATIVE RETINOPATHY OF LEFT EYE WITHOUT MACULAR EDEMA: ICD-10-CM

## 2024-06-06 DIAGNOSIS — H35.21 PROLIFERATIVE VITREORETINOPATHY OF RIGHT EYE: ICD-10-CM

## 2024-06-06 DIAGNOSIS — H27.01 APHAKIA, RIGHT: ICD-10-CM

## 2024-06-06 PROCEDURE — 3044F HG A1C LEVEL LT 7.0%: CPT | Mod: CPTII,S$GLB,, | Performed by: OPHTHALMOLOGY

## 2024-06-06 PROCEDURE — 3066F NEPHROPATHY DOC TX: CPT | Mod: CPTII,S$GLB,, | Performed by: OPHTHALMOLOGY

## 2024-06-06 PROCEDURE — 99214 OFFICE O/P EST MOD 30 MIN: CPT | Mod: S$GLB,,, | Performed by: OPHTHALMOLOGY

## 2024-06-06 PROCEDURE — 1160F RVW MEDS BY RX/DR IN RCRD: CPT | Mod: CPTII,S$GLB,, | Performed by: OPHTHALMOLOGY

## 2024-06-06 PROCEDURE — 99999 PR PBB SHADOW E&M-EST. PATIENT-LVL III: CPT | Mod: PBBFAC,,, | Performed by: OPHTHALMOLOGY

## 2024-06-06 PROCEDURE — 2022F DILAT RTA XM EVC RTNOPTHY: CPT | Mod: CPTII,S$GLB,, | Performed by: OPHTHALMOLOGY

## 2024-06-06 PROCEDURE — 1159F MED LIST DOCD IN RCRD: CPT | Mod: CPTII,S$GLB,, | Performed by: OPHTHALMOLOGY

## 2024-06-06 PROCEDURE — 92134 CPTRZ OPH DX IMG PST SGM RTA: CPT | Mod: S$GLB,,, | Performed by: OPHTHALMOLOGY

## 2024-06-06 NOTE — PROGRESS NOTES
Subjective:       Patient ID: Isabela Read is a 29 y.o. female      Chief Complaint   Patient presents with    Concerns About Ocular Health     Pt wants to have surgery OD     History of Present Illness  HPI     Concerns About Ocular Health     Additional comments: Pt wants to have surgery OD           Comments    Patient is here today for OCT/DFE.    Pt canceled planned surgery and now would like to proceed.    Pt. Reports no change of vision since last visit OU.    Pt. Denies floaters, flashes, curtain, shadow, pain or discomfort.    Gtt's: None          Last edited by Maximilian Montaño MD on 6/6/2024  6:54 PM.        Imaging:    See report    Assessment/Plan:     1. Type 1 diabetes mellitus with right eye affected by proliferative retinopathy and combined traction and rhegmatogenous retinal detachment  - Posterior Segment OCT Retina-Both eyes  2. Proliferative vitreoretinopathy of right eye  3. Aphakia, right  4. Silicone oil in eye after vitrectomy  Retina attached.  No traction noted.  SO in place  Visual potential unclear.  Tried to get CL refraction but optometrist said could not be improved sig with phoropter  Discussed continued observation since SO not causing problem vs SOR and secondary IOL  RBA discussed including replacing SO and RD.  Pt wishes to have surgery    RBA discussed in detail, inc surgical failure, need for more surgery, loss of vision/eye, no recovery of vision, surgical failure, bleeding, infection, high eye pressure (glaucoma), retina detachment, etc.  Pt wishes to proceed.    IOL Master today  25 ga PPV/SOR/sulcus IOL OD and PRP OS  GA    5. Type 1 diabetes mellitus with proliferative retinopathy of left eye without macular edema  S/p PRP.  No traction.    Pt relatively intolerant of laser in clinic and last session had less PRP than hoped for   Recommend PRP in OR  Pt agrees    - Posterior Segment OCT Retina-Both eyes    Follow up in about 2 weeks (around 6/20/2024), or if  symptoms worsen or fail to improve, for Surgery.

## 2024-06-10 ENCOUNTER — PATIENT MESSAGE (OUTPATIENT)
Dept: INTERNAL MEDICINE | Facility: CLINIC | Age: 29
End: 2024-06-10
Payer: MEDICARE

## 2024-07-01 ENCOUNTER — HOSPITAL ENCOUNTER (OUTPATIENT)
Dept: RADIOLOGY | Facility: HOSPITAL | Age: 29
Discharge: HOME | End: 2024-07-01
Attending: ORTHOPAEDIC SURGERY
Payer: MEDICARE

## 2024-07-01 ENCOUNTER — OFFICE VISIT (OUTPATIENT)
Dept: ORTHOPEDICS | Facility: CLINIC | Age: 29
End: 2024-07-01
Payer: MEDICARE

## 2024-07-01 DIAGNOSIS — M14.679 CHARCOT ARTHROPATHY OF MIDFOOT: ICD-10-CM

## 2024-07-01 DIAGNOSIS — S92.301D MULTIPLE CLOSED FRACTURES OF METATARSAL BONE OF RIGHT FOOT WITH ROUTINE HEALING, SUBSEQUENT ENCOUNTER: Primary | ICD-10-CM

## 2024-07-01 DIAGNOSIS — S92.301D MULTIPLE CLOSED FRACTURES OF METATARSAL BONE OF RIGHT FOOT WITH ROUTINE HEALING, SUBSEQUENT ENCOUNTER: ICD-10-CM

## 2024-07-01 PROCEDURE — 3044F HG A1C LEVEL LT 7.0%: CPT | Mod: CPTII,S$GLB,, | Performed by: ORTHOPAEDIC SURGERY

## 2024-07-01 PROCEDURE — 73630 X-RAY EXAM OF FOOT: CPT | Mod: TC,RT

## 2024-07-01 PROCEDURE — 3066F NEPHROPATHY DOC TX: CPT | Mod: CPTII,S$GLB,, | Performed by: ORTHOPAEDIC SURGERY

## 2024-07-01 PROCEDURE — 99999 PR PBB SHADOW E&M-EST. PATIENT-LVL III: CPT | Mod: PBBFAC,,, | Performed by: ORTHOPAEDIC SURGERY

## 2024-07-01 PROCEDURE — 1159F MED LIST DOCD IN RCRD: CPT | Mod: CPTII,S$GLB,, | Performed by: ORTHOPAEDIC SURGERY

## 2024-07-01 PROCEDURE — 99213 OFFICE O/P EST LOW 20 MIN: CPT | Mod: S$GLB,,, | Performed by: ORTHOPAEDIC SURGERY

## 2024-07-01 NOTE — PROGRESS NOTES
Isabela Read  Returns today for follow-up.  This is a 29-year-old female with type 1 diabetes with peripheral neuropathy as well as a kidney pancreas transplant who sustained multiple metatarsal fractures of her right foot about five months ago.  Her fractures have been treated with closed measures and her last visit was on 05/10/2024 at which time x-rays were stable and I felt that there was new bone forming at the fracture sites.  She reported minimal swelling and no pain at that time.  She returns today and reports that she has had no increased swelling since her last visit.  She is currently wearing Crocs.    Examination:  The right foot reveals some mild swelling compared to the left but no erythema.  She has some mild flattening of the longitudinal arch on the left compared to the right but appears to be stable from her previous exam.    Imaging: I ordered and reviewed standing x-ray of the right foot today.  The overall alignment remains stable from previous x-ray.  There has been no further collapse of the arch or increased forefoot abduction.  There is sterile appears to be some continued radiolucencies.  Of the base of the 5th metatarsals but no further displacement.    Impression:   1. Multiple closed fractures of metatarsal bone of right foot with routine healing, subsequent encounter  X-Ray Foot Complete Right    E - OTHER    Ambulatory referral/consult to Podiatry      2. Charcot arthropathy of midfoot  X-Ray Foot Complete Right    E - OTHER    Ambulatory referral/consult to Podiatry            Recommendation:  Her foot has been stable over the last three visits radiographically and clinically.  We had some discussion about shoe wear.  I explained to her she needs to be in better shoes and she needs custom orthotics to help prevent further issues with her foot.  I wrote a prescription for extra-depth shoes and orthotics and I also put in a referral to podiatry to set up routine diabetic  foot care.    Follow-up as needed

## 2024-07-02 ENCOUNTER — PATIENT MESSAGE (OUTPATIENT)
Dept: ORTHOPEDICS | Facility: CLINIC | Age: 29
End: 2024-07-02
Payer: MEDICARE

## 2024-07-08 PROBLEM — A49.9 BACTERIAL UTI: Status: RESOLVED | Noted: 2023-05-28 | Resolved: 2024-07-08

## 2024-07-08 PROBLEM — N39.0 BACTERIAL UTI: Status: RESOLVED | Noted: 2023-05-28 | Resolved: 2024-07-08

## 2024-07-12 DIAGNOSIS — Z94.0 STATUS POST DECEASED-DONOR KIDNEY TRANSPLANTATION: ICD-10-CM

## 2024-07-12 RX ORDER — SODIUM BICARBONATE 650 MG/1
1300 TABLET ORAL 3 TIMES DAILY
Qty: 180 TABLET | Refills: 11 | Status: CANCELLED | OUTPATIENT
Start: 2024-07-12 | End: 2025-07-12

## 2024-07-12 NOTE — PRE-PROCEDURE INSTRUCTIONS
PREOP INSTRUCTIONS:     NO SOLID FOOD, MILK OR MILK PRODUCTS 8 HOURS PRIOR TO SX START TIME.  YOU MAY ONLY HAVE SIPS OF WATER WITH MORNING MEDICATIONS (1) HOUR PRIOR TO ARRIVAL TO HOSPITAL.   Instructed to follow the surgeon's instructions if they differ from these.Shower instructions as well as directions to the Surgery Center were given.Encouraged to wear loose fitting,comfortable clothing.Medication instructions for pm prior to and am of procedure reviewed.Instructed to avoid taking vitamins,supplements,aspirin and ibuprofen the morning of surgery. Patient's questions and concerns addressed .Patient informed of the current visitor policy      Patient denies any side effects or issues with anesthesia or sedation.       ARRIVAL TO [facility]

## 2024-07-15 ENCOUNTER — TELEPHONE (OUTPATIENT)
Dept: OPHTHALMOLOGY | Facility: CLINIC | Age: 29
End: 2024-07-15
Payer: MEDICARE

## 2024-07-15 ENCOUNTER — ANESTHESIA EVENT (OUTPATIENT)
Dept: SURGERY | Facility: HOSPITAL | Age: 29
End: 2024-07-15
Payer: MEDICARE

## 2024-07-15 DIAGNOSIS — Z94.0 STATUS POST DECEASED-DONOR KIDNEY TRANSPLANTATION: ICD-10-CM

## 2024-07-15 PROBLEM — E11.319 DIABETIC RETINOPATHY OF LEFT EYE: Status: ACTIVE | Noted: 2024-07-15

## 2024-07-15 RX ORDER — SODIUM BICARBONATE 650 MG/1
1300 TABLET ORAL 3 TIMES DAILY
Qty: 180 TABLET | Refills: 11 | Status: SHIPPED | OUTPATIENT
Start: 2024-07-15 | End: 2025-07-15

## 2024-07-15 NOTE — H&P
Pre-Operative History & Physical  Ophthalmology      SUBJECTIVE:     History of Present Illness:  Patient is a 29 y.o. 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    MEDICATIONS:   PTA Medications   Medication Sig    ketoconazole (NIZORAL) 2 % cream Apply topically 2 (two) times daily as needed for dry areas of face    medroxyPROGESTERone (DEPO-PROVERA) 150 mg/mL Syrg Inject 1 mL (150 mg total) into the muscle every 3 (three) months.    mycophenolate (CELLCEPT) 250 mg Cap Take 3 capsules (750 mg total) by mouth 2 (two) times daily. Resume after finishing antibiotic    predniSONE (DELTASONE) 5 MG tablet Take 1 tablet (5 mg total) by mouth once daily.    promethazine (PHENERGAN) 12.5 MG Tab Take 1 tablet (12.5 mg total) by mouth every 6 (six) hours as needed (nausea).    sodium bicarbonate 650 MG tablet Take 2 tablets (1,300 mg total) by mouth 3 (three) times daily.    tacrolimus (PROGRAF) 1 MG Cap Take 4 capsules (4 mg total) by mouth every 12 (twelve) hours.    tretinoin (RETIN-A) 0.05 % cream Apply topically every evening. Start with every other night and move up to nightly after 2 weeks if not too dry.    tretinoin (RETIN-A) 0.1 % cream Apply topically every evening. Increase Retin A to 0.1%.  Rotate this with old one until weaker strength runs out and then go nightly with 0.1%. (Patient not taking: Reported on 7/12/2024)       ALLERGIES: Review of patient's allergies indicates:  No Known Allergies    PAST MEDICAL HISTORY:   Past Medical History:   Diagnosis Date    Acute cystitis without hematuria 11/25/2022    Acute kidney injury superimposed on chronic kidney disease 4/7/2021    Anemia     Anemia in ESRD (end-stage renal disease) 7/29/2020    Lab Results  Component Value Date   WBC 7.23 07/29/2020   HGB 7.1 (L) 07/29/2020   HCT 22.2 (L) 07/29/2020   MCV 91 07/29/2020    (H)  07/29/2020   Following with hematology and scheduled to undergo iron infusions    Bacteremia due to Escherichia coli 1/23/2023    Bilious vomiting with nausea 12/12/2022    Chronic hypertension with exacerbation during pregnancy in second trimester 11/6/2020    Current regimen (11/6/20):  - Carvedilol 12.5 mg BID - Nifedipine 30 mg daily Baseline CKD + proteinuria    Chronic kidney disease     Depression     Diabetes mellitus     Diabetic retinopathy     Diarrhea     Encounter for blood transfusion     End stage renal failure on dialysis     Fever 12/1/2022    Fever of undetermined origin 12/12/2022    Gastroparesis     Glaucoma     Hepatomegaly 4/29/2021    History of diabetes mellitus, type I 12/20/2022    Hx of psychiatric care     Hyperlipidemia     Hypertension     Nausea and vomiting 12/12/2022    Nephrotic syndrome     Nephrotic syndrome 3/4/2021    Nonspecific reaction to tuberculin skin test without active tuberculosis 10/1/2021    Orthostatic hypotension 1/25/2023    Palpitations     Poor fetal growth affecting management of mother in second trimester 10/15/2020    Pyelonephritis, acute 11/26/2022    Restrictive lung disease     Retinal detachment     Sepsis 12/1/2022    Sepsis 11/25/2022    Sepsis due to Escherichia coli 1/23/2023    Severe pre-eclampsia in second trimester 11/6/2020    Severe sepsis 11/25/2022    SIRS (systemic inflammatory response syndrome) 12/6/2022    Status post pancreas transplantation 11/26/2022 11/2/2022    Type 1 diabetes mellitus with end-stage renal disease (ESRD) 2/24/2015    Followed by Dr. Mayo.  Last A1C was 13  Currently on lantus 20 units qAM and humolog 10 units with meals  - a fetal echocardiogram around 22-24 weeks - needs eye exam, podiatry exam  - needs EKG, maternal echo and fu with cardiology  - ASA 81mg, folic acid 4mg PO daily - hemoglobin A1c every 4-6 weeks -24 hour urine for protein and creatinine clearance should be performed. Patient will also need a      PAST SURGICAL HISTORY:   Past Surgical History:   Procedure Laterality Date    AV FISTULA PLACEMENT Left 04/07/2021    Procedure: CREATION, AV FISTULA;  Surgeon: Roberto Ryan MD;  Location: [facility] OR 2ND FLR;  Service: Peripheral Vascular;  Laterality: Left;    COLONOSCOPY N/A 03/16/2022    Procedure: COLONOSCOPY;  Surgeon: Tavo Kwok MD;  Location: [facility] ENDO (4TH FLR);  Service: Endoscopy;  Laterality: N/A;  Questionable history of delayed gastric emptying longstanding diabetes now on eating pre transplant workup for history of nausea vomiting which seems to have improved with dialysis also chronic diarrhea and history of anemia pre transplant workup for kidney transplant. 3    CYSTOSCOPY      ESOPHAGOGASTRODUODENOSCOPY N/A 03/16/2022    Procedure: EGD (ESOPHAGOGASTRODUODENOSCOPY);  Surgeon: Tavo Kwok MD;  Location: [facility] ENDO (4TH FLR);  Service: Endoscopy;  Laterality: N/A;  Questionable history of delayed gastric emptying longstanding diabetes now on eating pre transplant workup for history of nausea vomiting which seems to have improved with dialysis also chronic diarrhea and history of anemia pre transplant workup for    FISTULOGRAM N/A 08/11/2021    Procedure: Fistulogram;  Surgeon: Roberto Ryan MD;  Location: [facility] CATH LAB;  Service: Cardiology;  Laterality: N/A;    KIDNEY TRANSPLANT Right 11/02/2022    Procedure: TRANSPLANT, KIDNEY;  Surgeon: Kumar Rodriges Jr., MD;  Location: [facility] OR 2ND FLR;  Service: Transplant;  Laterality: Right;    LASER PHOTOCOAGULATION OF RETINA Right 05/31/2022    Procedure: PHOTOCOAGULATION, RETINA, USING LASER;  Surgeon: Maximilian Montaño MD;  Location: [facility] OR 1ST FLR;  Service: Ophthalmology;  Laterality: Right;    PERCUTANEOUS TRANSLUMINAL ANGIOPLASTY OF ARTERIOVENOUS FISTULA N/A 08/11/2021    Procedure: PTA, AV FISTULA;  Surgeon: Roberto Ryan MD;  Location: [facility] CATH LAB;  Service: Cardiology;  Laterality: N/A;    REMOVAL  IMPLANT, POSTERIOR SEGMENT, INTRAOCULAR Right 02/01/2022    Procedure: REMOVAL IMPLANT, POSTERIOR SEGMENT, INTRAOCULAR;  Surgeon: Maximilian Montaño MD;  Location: [facility] OR 1ST FLR;  Service: Ophthalmology;  Laterality: Right;    REPAIR OF RETINAL DETACHMENT WITH VITRECTOMY Right 01/25/2022    Procedure: REPAIR, RETINAL DETACHMENT, WITH VITRECTOMY, MEMBRANE PEEL, LASER, INJECTION OF GAS VS OIL;  Surgeon: Maximilian Montaño MD;  Location: [facility] OR 1ST FLR;  Service: Ophthalmology;  Laterality: Right;    REPAIR, RETINAL DETACHMENT, COMPLEX, WITH VITRECTOMY AND MEMBRANE PEELING Right 3/21/2023    Procedure: REPAIR,RETINAL DETACHMENT,COMPLEX,WITH VITRECTOMY AND MEMBRANE PEELING;  Surgeon: Maximilian Montaño MD;  Location: [facility] OR 1ST FLR;  Service: Ophthalmology;  Laterality: Right;  25ga    REVISION OF ARTERIOVENOUS FISTULA Left 12/10/2021    Procedure: REVISION, AV FISTULA with BVT;  Surgeon: Roberto Ryan MD;  Location: [facility] OR 2ND FLR;  Service: Peripheral Vascular;  Laterality: Left;    TRANSPLANTATION OF PANCREAS N/A 11/02/2022    Procedure: TRANSPLANT, PANCREAS;  Surgeon: Kumar Rodriges Jr., MD;  Location: [facility] OR 2ND FLR;  Service: Transplant;  Laterality: N/A;    VITRECTOMY BY PARS PLANA APPROACH Right 02/01/2022    Procedure: VITRECTOMY, PARS PLANA APPROACH;  Surgeon: Maximilian Montaño MD;  [facility];  Service: Ophthalmology;  Laterality: Right;    VITRECTOMY BY PARS PLANA APPROACH Right 05/31/2022    Procedure: VITRECTOMY, PARS PLANA APPROACH;  Surgeon: Maximilian Montaño MD;  [facility];  Service: Ophthalmology;  Laterality: Right;     PAST FAMILY HISTORY:   Family History   Problem Relation Name Age of Onset    Hypertension Mother      Heart disease Father      Diabetes Brother      Celiac disease Neg Hx      Cirrhosis Neg Hx      Colon cancer Neg Hx      Colon polyps Neg Hx      Crohn's disease Neg Hx      Inflammatory bowel disease Neg Hx      Liver cancer  Neg Hx      Liver disease Neg Hx      Rectal cancer Neg Hx      Stomach cancer Neg Hx      Ulcerative colitis Neg Hx      Esophageal cancer Neg Hx      Hemochromatosis Neg Hx      Pancreatic cancer Neg Hx      Kidney cancer Neg Hx      Bladder Cancer Neg Hx      Uterine cancer Neg Hx      Ovarian cancer Neg Hx       SOCIAL HISTORY:   Social History     Tobacco Use    Smoking status: Never    Smokeless tobacco: Never   Substance Use Topics    Alcohol use: No    Drug use: No        MENTAL STATUS: Alert    REVIEW OF SYSTEMS: Negative    OBJECTIVE:     Vital Signs (Most Recent)       Physical Exam:  General: NAD  HEENT: AT/NC, rhegmatogenous retinal detachment OD s/p SO, PDR OD, PRP OS  Lungs: Adequate respirations  Heart: + pulses  Abdomen: Soft    ASSESSMENT/PLAN:     Patient is a 29 y.o. 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 The patient/family voiced good understanding, the informed consent was signed, witnessed, and placed in chart. All patient and family questions were answered.   - Will proceed with 25 ga PPV/SOR/sulcus IOL OD and PRP OS   - Plan for general anesthesia  - Allergies reviewed: Review of patient's allergies indicates:  No Known Allergies      Clarissa Diaz MD  [employer] Ophthalmology PGY-2

## 2024-07-15 NOTE — TELEPHONE ENCOUNTER
----- Message from Eula English sent at 7/15/2024  2:52 PM CDT -----  Regarding: Refill  Contact: Pt  [phone]          Rx Name:  sodium bicarbonate 650 MG tablet      Preferred Pharmacy with number:    [facility]  [address]  [facility] [address]  Phone: [phone] Fax: [phone]         Ordering Provider: Melani Mcintyre MD    Contact preference:  [phone]    Comments/Notes:   Requesting refill  pt will be out tomorrow morning  please call if there are any issues with getting refill

## 2024-07-16 ENCOUNTER — HOSPITAL ENCOUNTER (OUTPATIENT)
Facility: HOSPITAL | Age: 29
Discharge: HOME | End: 2024-07-16
Attending: OPHTHALMOLOGY | Admitting: OPHTHALMOLOGY
Payer: MEDICARE

## 2024-07-16 ENCOUNTER — ANESTHESIA (OUTPATIENT)
Dept: SURGERY | Facility: HOSPITAL | Age: 29
End: 2024-07-16
Payer: MEDICARE

## 2024-07-16 VITALS
OXYGEN SATURATION: 99 % | DIASTOLIC BLOOD PRESSURE: 72 MMHG | RESPIRATION RATE: 20 BRPM | TEMPERATURE: 98 F | HEART RATE: 80 BPM | SYSTOLIC BLOOD PRESSURE: 142 MMHG

## 2024-07-16 DIAGNOSIS — H33.21 RETINAL DETACHMENT, RIGHT: ICD-10-CM

## 2024-07-16 DIAGNOSIS — E11.3552 STABLE PROLIFERATIVE DIABETIC RETINOPATHY OF LEFT EYE ASSOCIATED WITH TYPE 2 DIABETES MELLITUS: ICD-10-CM

## 2024-07-16 DIAGNOSIS — E10.3593 PROLIFERATIVE DIABETIC RETINOPATHY OF BOTH EYES WITHOUT MACULAR EDEMA ASSOCIATED WITH TYPE 1 DIABETES MELLITUS: Primary | ICD-10-CM

## 2024-07-16 DIAGNOSIS — H27.01 APHAKIA, RIGHT: ICD-10-CM

## 2024-07-16 LAB
B-HCG UR QL: NEGATIVE
CTP QC/QA: YES
POCT GLUCOSE: 125 MG/DL (ref 70–110)

## 2024-07-16 PROCEDURE — 66985 INSERT LENS PROSTHESIS: CPT | Mod: 51,RT,, | Performed by: OPHTHALMOLOGY

## 2024-07-16 PROCEDURE — 63600175 PHARM REV CODE 636 W HCPCS: Performed by: NURSE ANESTHETIST, CERTIFIED REGISTERED

## 2024-07-16 PROCEDURE — 67228 TREATMENT X10SV RETINOPATHY: CPT | Mod: 51,LT,, | Performed by: OPHTHALMOLOGY

## 2024-07-16 PROCEDURE — 27201423 OPTIME MED/SURG SUP & DEVICES STERILE SUPPLY: Performed by: OPHTHALMOLOGY

## 2024-07-16 PROCEDURE — 36000706: Performed by: OPHTHALMOLOGY

## 2024-07-16 PROCEDURE — 67036 REMOVAL OF INNER EYE FLUID: CPT | Mod: RT,,, | Performed by: OPHTHALMOLOGY

## 2024-07-16 PROCEDURE — 36000707: Performed by: OPHTHALMOLOGY

## 2024-07-16 PROCEDURE — 37000009 HC ANESTHESIA EA ADD 15 MINS: Performed by: OPHTHALMOLOGY

## 2024-07-16 PROCEDURE — 71000044 HC DOSC ROUTINE RECOVERY FIRST HOUR: Performed by: OPHTHALMOLOGY

## 2024-07-16 PROCEDURE — 27200651 HC AIRWAY, LMA: Performed by: ANESTHESIOLOGY

## 2024-07-16 PROCEDURE — D9220A PRA ANESTHESIA: Mod: CRNA,,, | Performed by: NURSE ANESTHETIST, CERTIFIED REGISTERED

## 2024-07-16 PROCEDURE — 63600175 PHARM REV CODE 636 W HCPCS: Performed by: OPHTHALMOLOGY

## 2024-07-16 PROCEDURE — 71000015 HC POSTOP RECOV 1ST HR: Performed by: OPHTHALMOLOGY

## 2024-07-16 PROCEDURE — 37000008 HC ANESTHESIA 1ST 15 MINUTES: Performed by: OPHTHALMOLOGY

## 2024-07-16 PROCEDURE — 25000003 PHARM REV CODE 250: Performed by: OPHTHALMOLOGY

## 2024-07-16 PROCEDURE — 25000003 PHARM REV CODE 250: Performed by: NURSE ANESTHETIST, CERTIFIED REGISTERED

## 2024-07-16 PROCEDURE — 63600175 PHARM REV CODE 636 W HCPCS: Performed by: ANESTHESIOLOGY

## 2024-07-16 PROCEDURE — V2632 POST CHMBR INTRAOCULAR LENS: HCPCS | Performed by: OPHTHALMOLOGY

## 2024-07-16 PROCEDURE — D9220A PRA ANESTHESIA: Mod: ANES,,, | Performed by: ANESTHESIOLOGY

## 2024-07-16 PROCEDURE — 25000003 PHARM REV CODE 250

## 2024-07-16 PROCEDURE — 82962 GLUCOSE BLOOD TEST: CPT | Performed by: OPHTHALMOLOGY

## 2024-07-16 DEVICE — IMPLANTABLE DEVICE: Type: IMPLANTABLE DEVICE | Site: EYE | Status: FUNCTIONAL

## 2024-07-16 RX ORDER — ATROPINE SULFATE 10 MG/ML
1 SOLUTION/ DROPS OPHTHALMIC
Status: DISCONTINUED | OUTPATIENT
Start: 2024-07-16 | End: 2024-07-16 | Stop reason: HOSPADM

## 2024-07-16 RX ORDER — FENTANYL CITRATE 50 UG/ML
INJECTION, SOLUTION INTRAMUSCULAR; INTRAVENOUS
Status: DISCONTINUED | OUTPATIENT
Start: 2024-07-16 | End: 2024-07-16

## 2024-07-16 RX ORDER — TETRACAINE HYDROCHLORIDE 5 MG/ML
1 SOLUTION OPHTHALMIC
Status: DISCONTINUED | OUTPATIENT
Start: 2024-07-16 | End: 2024-07-16 | Stop reason: HOSPADM

## 2024-07-16 RX ORDER — DEXMEDETOMIDINE HYDROCHLORIDE 100 UG/ML
INJECTION, SOLUTION INTRAVENOUS
Status: DISCONTINUED | OUTPATIENT
Start: 2024-07-16 | End: 2024-07-16

## 2024-07-16 RX ORDER — VANCOMYCIN HYDROCHLORIDE 500 MG/10ML
INJECTION, POWDER, LYOPHILIZED, FOR SOLUTION INTRAVENOUS
Status: DISCONTINUED
Start: 2024-07-16 | End: 2024-07-16 | Stop reason: HOSPADM

## 2024-07-16 RX ORDER — ONDANSETRON HYDROCHLORIDE 2 MG/ML
INJECTION, SOLUTION INTRAVENOUS
Status: DISCONTINUED | OUTPATIENT
Start: 2024-07-16 | End: 2024-07-16

## 2024-07-16 RX ORDER — EPINEPHRINE 1 MG/ML
INJECTION, SOLUTION, CONCENTRATE INTRAVENOUS
Status: DISCONTINUED
Start: 2024-07-16 | End: 2024-07-16 | Stop reason: HOSPADM

## 2024-07-16 RX ORDER — DEXAMETHASONE SODIUM PHOSPHATE 4 MG/ML
INJECTION, SOLUTION INTRA-ARTICULAR; INTRALESIONAL; INTRAMUSCULAR; INTRAVENOUS; SOFT TISSUE
Status: DISCONTINUED
Start: 2024-07-16 | End: 2024-07-16 | Stop reason: HOSPADM

## 2024-07-16 RX ORDER — SODIUM CHLORIDE 0.9 % (FLUSH) 0.9 %
10 SYRINGE (ML) INJECTION
Status: DISCONTINUED | OUTPATIENT
Start: 2024-07-16 | End: 2024-07-16 | Stop reason: HOSPADM

## 2024-07-16 RX ORDER — ACETAMINOPHEN 325 MG/1
650 TABLET ORAL EVERY 4 HOURS PRN
Status: DISCONTINUED | OUTPATIENT
Start: 2024-07-16 | End: 2024-07-16 | Stop reason: HOSPADM

## 2024-07-16 RX ORDER — PHENYLEPHRINE HYDROCHLORIDE 25 MG/ML
1 SOLUTION/ DROPS OPHTHALMIC
Status: DISCONTINUED | OUTPATIENT
Start: 2024-07-16 | End: 2024-07-16 | Stop reason: HOSPADM

## 2024-07-16 RX ORDER — EPINEPHRINE 1 MG/ML
INJECTION, SOLUTION, CONCENTRATE INTRAVENOUS
Status: DISCONTINUED | OUTPATIENT
Start: 2024-07-16 | End: 2024-07-16 | Stop reason: HOSPADM

## 2024-07-16 RX ORDER — NEOMYCIN SULFATE, POLYMYXIN B SULFATE, AND DEXAMETHASONE 3.5; 10000; 1 MG/G; [USP'U]/G; MG/G
OINTMENT OPHTHALMIC
Status: DISCONTINUED | OUTPATIENT
Start: 2024-07-16 | End: 2024-07-16 | Stop reason: HOSPADM

## 2024-07-16 RX ORDER — HYDROCODONE BITARTRATE AND ACETAMINOPHEN 5; 325 MG/1; MG/1
1 TABLET ORAL EVERY 4 HOURS PRN
Status: DISCONTINUED | OUTPATIENT
Start: 2024-07-16 | End: 2024-07-16 | Stop reason: HOSPADM

## 2024-07-16 RX ORDER — PREDNISOLONE ACETATE 10 MG/ML
1 SUSPENSION/ DROPS OPHTHALMIC
Status: DISCONTINUED | OUTPATIENT
Start: 2024-07-16 | End: 2024-07-16 | Stop reason: HOSPADM

## 2024-07-16 RX ORDER — FENTANYL CITRATE 50 UG/ML
25 INJECTION, SOLUTION INTRAMUSCULAR; INTRAVENOUS EVERY 5 MIN PRN
Status: DISCONTINUED | OUTPATIENT
Start: 2024-07-16 | End: 2024-07-16 | Stop reason: HOSPADM

## 2024-07-16 RX ORDER — MOXIFLOXACIN 5 MG/ML
1 SOLUTION/ DROPS OPHTHALMIC
Status: DISCONTINUED | OUTPATIENT
Start: 2024-07-16 | End: 2024-07-16 | Stop reason: HOSPADM

## 2024-07-16 RX ORDER — LIDOCAINE HYDROCHLORIDE 20 MG/ML
INJECTION, SOLUTION EPIDURAL; INFILTRATION; INTRACAUDAL; PERINEURAL
Status: DISCONTINUED
Start: 2024-07-16 | End: 2024-07-16 | Stop reason: HOSPADM

## 2024-07-16 RX ORDER — BUPIVACAINE HYDROCHLORIDE 7.5 MG/ML
INJECTION, SOLUTION EPIDURAL; RETROBULBAR
Status: DISCONTINUED
Start: 2024-07-16 | End: 2024-07-16 | Stop reason: HOSPADM

## 2024-07-16 RX ORDER — BUPIVACAINE HYDROCHLORIDE 7.5 MG/ML
INJECTION, SOLUTION EPIDURAL; RETROBULBAR
Status: DISCONTINUED | OUTPATIENT
Start: 2024-07-16 | End: 2024-07-16 | Stop reason: HOSPADM

## 2024-07-16 RX ORDER — LIDOCAINE HYDROCHLORIDE 20 MG/ML
INJECTION INTRAVENOUS
Status: DISCONTINUED | OUTPATIENT
Start: 2024-07-16 | End: 2024-07-16

## 2024-07-16 RX ORDER — DEXAMETHASONE SODIUM PHOSPHATE 4 MG/ML
INJECTION, SOLUTION INTRA-ARTICULAR; INTRALESIONAL; INTRAMUSCULAR; INTRAVENOUS; SOFT TISSUE
Status: DISCONTINUED | OUTPATIENT
Start: 2024-07-16 | End: 2024-07-16 | Stop reason: HOSPADM

## 2024-07-16 RX ORDER — NEOMYCIN SULFATE, POLYMYXIN B SULFATE, AND DEXAMETHASONE 3.5; 10000; 1 MG/G; [USP'U]/G; MG/G
OINTMENT OPHTHALMIC
Status: DISCONTINUED
Start: 2024-07-16 | End: 2024-07-16 | Stop reason: HOSPADM

## 2024-07-16 RX ORDER — MIDAZOLAM HYDROCHLORIDE 1 MG/ML
INJECTION INTRAMUSCULAR; INTRAVENOUS
Status: DISCONTINUED | OUTPATIENT
Start: 2024-07-16 | End: 2024-07-16

## 2024-07-16 RX ORDER — GLUCAGON 1 MG
1 KIT INJECTION
Status: DISCONTINUED | OUTPATIENT
Start: 2024-07-16 | End: 2024-07-16 | Stop reason: HOSPADM

## 2024-07-16 RX ORDER — INDOCYANINE GREEN AND WATER 25 MG
KIT INJECTION
Status: DISCONTINUED
Start: 2024-07-16 | End: 2024-07-16 | Stop reason: HOSPADM

## 2024-07-16 RX ORDER — ACETAMINOPHEN 325 MG/1
325 TABLET ORAL EVERY 6 HOURS PRN
Start: 2024-07-16

## 2024-07-16 RX ORDER — DEXAMETHASONE SODIUM PHOSPHATE 4 MG/ML
INJECTION, SOLUTION INTRA-ARTICULAR; INTRALESIONAL; INTRAMUSCULAR; INTRAVENOUS; SOFT TISSUE
Status: DISCONTINUED | OUTPATIENT
Start: 2024-07-16 | End: 2024-07-16

## 2024-07-16 RX ORDER — PROPOFOL 10 MG/ML
VIAL (ML) INTRAVENOUS
Status: DISCONTINUED | OUTPATIENT
Start: 2024-07-16 | End: 2024-07-16

## 2024-07-16 RX ADMIN — SODIUM CHLORIDE: 9 INJECTION, SOLUTION INTRAVENOUS at 08:07

## 2024-07-16 RX ADMIN — FENTANYL CITRATE 25 MCG: 50 INJECTION, SOLUTION INTRAMUSCULAR; INTRAVENOUS at 07:07

## 2024-07-16 RX ADMIN — MOXIFLOXACIN 1 DROP: 5 SOLUTION/ DROPS OPHTHALMIC at 06:07

## 2024-07-16 RX ADMIN — PREDNISOLONE ACETATE 1 DROP: 10 SUSPENSION/ DROPS OPHTHALMIC at 06:07

## 2024-07-16 RX ADMIN — ONDANSETRON 4 MG: 2 INJECTION INTRAMUSCULAR; INTRAVENOUS at 07:07

## 2024-07-16 RX ADMIN — PHENYLEPHRINE HYDROCHLORIDE 1 DROP: 25 SOLUTION/ DROPS OPHTHALMIC at 06:07

## 2024-07-16 RX ADMIN — TETRACAINE HYDROCHLORIDE 1 DROP: 5 SOLUTION OPHTHALMIC at 06:07

## 2024-07-16 RX ADMIN — FENTANYL CITRATE 25 MCG: 50 INJECTION, SOLUTION INTRAMUSCULAR; INTRAVENOUS at 09:07

## 2024-07-16 RX ADMIN — ATROPINE SULFATE 1 DROP: 10 SOLUTION/ DROPS OPHTHALMIC at 06:07

## 2024-07-16 RX ADMIN — PROPOFOL 200 MG: 10 INJECTION, EMULSION INTRAVENOUS at 07:07

## 2024-07-16 RX ADMIN — MOXIFLOXACIN OPHTHALMIC 1 DROP: 5 SOLUTION/ DROPS OPHTHALMIC at 06:07

## 2024-07-16 RX ADMIN — SODIUM CHLORIDE: 9 INJECTION, SOLUTION INTRAVENOUS at 07:07

## 2024-07-16 RX ADMIN — FENTANYL CITRATE 25 MCG: 50 INJECTION, SOLUTION INTRAMUSCULAR; INTRAVENOUS at 08:07

## 2024-07-16 RX ADMIN — HYDROCODONE BITARTRATE AND ACETAMINOPHEN 1 TABLET: 5; 325 TABLET ORAL at 10:07

## 2024-07-16 RX ADMIN — LIDOCAINE HYDROCHLORIDE 100 MG: 20 INJECTION INTRAVENOUS at 07:07

## 2024-07-16 RX ADMIN — FENTANYL CITRATE 25 MCG: 50 INJECTION INTRAMUSCULAR; INTRAVENOUS at 10:07

## 2024-07-16 RX ADMIN — MIDAZOLAM HYDROCHLORIDE 2 MG: 2 INJECTION, SOLUTION INTRAMUSCULAR; INTRAVENOUS at 07:07

## 2024-07-16 RX ADMIN — DEXAMETHASONE SODIUM PHOSPHATE 4 MG: 4 INJECTION, SOLUTION INTRAMUSCULAR; INTRAVENOUS at 07:07

## 2024-07-16 RX ADMIN — DEXMEDETOMIDINE 4 MCG: 200 INJECTION, SOLUTION INTRAVENOUS at 09:07

## 2024-07-16 NOTE — BRIEF OP NOTE
Date of Procedure: 07/16/2024     Pre-Op Dx: Proliferative diabetic retinopathy of both eyes, aphakia, right eye    Post Op Dx: Same    Procedure Performed:   Panretinal photocoagulation, left eye  Pars plana vitrectomy, removal of silicone oil, implantation of secondary IOL, right eye    Attending Surgeon: ARMIN Montaño    Assistant Surgeon: CLAUDIA Diaz    Anesthesia: General    Estimated blood loss: Minimal    Complication: None    Specimen: None    Disposition: Stable to recovery    Findings: None    Outcome: retina attached, normotensive both eyes    Date of Discharge: 07/16/2024    Discharge Disposition: Home    F/U: 07/17/24

## 2024-07-16 NOTE — ANESTHESIA PREPROCEDURE EVALUATION
2024  Isabela Read is a 29 y.o., female.        Isabela Read is a 29 y.o. female sp patricio pancreas transplant with ongoing retinal detachment procedures. She denies anesthetic complications with adequate kidney and pancreatic function. Denies pain or other systemic problem presently.      Patient Active Problem List   Diagnosis    Renovascular hypertension    Ventricular bigeminy    Vitamin D deficiency    Hyperlipidemia    Anxiety    Recurrent major depressive disorder, in partial remission    Anemia due to chronic kidney disease    Nephrotic syndrome    Secondary hyperparathyroidism    Hepatomegaly    Hypertension, essential    Iron overload    Acute angle-closure glaucoma of right eye    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, associated with type 1 diabetes mellitus    Insomnia    Gastroparesis    Hypocalcemia    Major depressive disorder, single episode, unspecified    (HFpEF) heart failure with preserved ejection fraction    Status post -donor simultaneous pancreas kidney transplantation    At risk for opportunistic infections    Prophylactic immunotherapy    Immunodeficiency due to long term immunosuppressive drug therapy    Pancreas replaced by transplant    Steroid-induced hyperglycemia    AVF (arteriovenous fistula)    History of diabetes mellitus, type I    Long term systemic steroid user    Interstitial pulmonary disease, unspecified    Hypophosphatemia    Lisfranc dislocation, right, initial encounter    Normocytic anemia    Diabetic retinopathy of left eye       Review of patient's allergies indicates:  No Known Allergies     No current facility-administered medications on file prior to encounter.     Current Outpatient Medications on File Prior to Encounter   Medication Sig Dispense Refill     ketoconazole (NIZORAL) 2 % cream Apply topically 2 (two) times daily as needed for dry areas of face 60 g 1    tacrolimus (PROGRAF) 1 MG Cap Take 4 capsules (4 mg total) by mouth every 12 (twelve) hours. 240 capsule 11    tretinoin (RETIN-A) 0.05 % cream Apply topically every evening. Start with every other night and move up to nightly after 2 weeks if not too dry. 20 g 1    tretinoin (RETIN-A) 0.1 % cream Apply topically every evening. Increase Retin A to 0.1%.  Rotate this with old one until weaker strength runs out and then go nightly with 0.1%. (Patient not taking: Reported on 7/12/2024) 20 g 3       Past Surgical History:   Procedure Laterality Date    AV FISTULA PLACEMENT Left 04/07/2021    Procedure: CREATION, AV FISTULA;  Surgeon: Roberto Ryan MD;  [facility];  Service: Peripheral Vascular;  Laterality: Left;    COLONOSCOPY N/A 03/16/2022    Procedure: COLONOSCOPY;  Surgeon: Tavo Kwok MD;  [facility]  Service: Endoscopy;  Laterality: N/A;  Questionable history of delayed gastric emptying longstanding diabetes now on eating pre transplant workup for history of nausea vomiting which seems to have improved with dialysis also chronic diarrhea and history of anemia pre transplant workup for kidney transplant. 3    CYSTOSCOPY      ESOPHAGOGASTRODUODENOSCOPY N/A 03/16/2022    Procedure: EGD (ESOPHAGOGASTRODUODENOSCOPY);  Surgeon: Tavo Kwok MD;  Location: [facility] ENDO (4TH FLR);  Service: Endoscopy;  Laterality: N/A;  Questionable history of delayed gastric emptying longstanding diabetes now on eating pre transplant workup for history of nausea vomiting which seems to have improved with dialysis also chronic diarrhea and history of anemia pre transplant workup for    FISTULOGRAM N/A 08/11/2021    Procedure: Fistulogram;  Surgeon: Roberto Ryan MD;  Location: [facility] CATH LAB;  Service: Cardiology;  Laterality: N/A;    KIDNEY TRANSPLANT Right 11/02/2022     Procedure: TRANSPLANT, KIDNEY;  Surgeon: Kumar Rodriges Jr., MD;  Location: [facility] OR 2ND FLR;  Service: Transplant;  Laterality: Right;    LASER PHOTOCOAGULATION OF RETINA Right 05/31/2022    Procedure: PHOTOCOAGULATION, RETINA, USING LASER;  Surgeon: Maximilian Montaño MD;  Location: [facility] OR 1ST FLR;  Service: Ophthalmology;  Laterality: Right;    PERCUTANEOUS TRANSLUMINAL ANGIOPLASTY OF ARTERIOVENOUS FISTULA N/A 08/11/2021    Procedure: PTA, AV FISTULA;  Surgeon: Roberto Ryan MD;  Location: [facility] CATH LAB;  Service: Cardiology;  Laterality: N/A;    REMOVAL IMPLANT, POSTERIOR SEGMENT, INTRAOCULAR Right 02/01/2022    Procedure: REMOVAL IMPLANT, POSTERIOR SEGMENT, INTRAOCULAR;  Surgeon: Maximilian Montaño MD;  [facility];  Service: Ophthalmology;  Laterality: Right;    REPAIR OF RETINAL DETACHMENT WITH VITRECTOMY Right 01/25/2022    Procedure: REPAIR, RETINAL DETACHMENT, WITH VITRECTOMY, MEMBRANE PEEL, LASER, INJECTION OF GAS VS OIL;  Surgeon: Maximilian Montaño MD;  Location: [facility] OR [address] [place] [address];  Service: Ophthalmology;  Laterality: Right;    REPAIR, RETINAL DETACHMENT, COMPLEX, WITH VITRECTOMY AND MEMBRANE PEELING Right 3/21/2023    Procedure: REPAIR,RETINAL DETACHMENT,COMPLEX,WITH VITRECTOMY AND MEMBRANE PEELING;  Surgeon: Maximilian Montaño MD;  [facility];  Service: Ophthalmology;  Laterality: Right;  25ga    REVISION OF ARTERIOVENOUS FISTULA Left 12/10/2021    Procedure: REVISION, AV FISTULA with BVT;  Surgeon: Roberto Ryan MD;  [facility];  Service: Peripheral Vascular;  Laterality: Left;    TRANSPLANTATION OF PANCREAS N/A 11/02/2022    Procedure: TRANSPLANT, PANCREAS;  Surgeon: Kumar Rodriges Jr., MD;  [facility];  Service: Transplant;  Laterality: N/A;    VITRECTOMY BY PARS PLANA APPROACH Right 02/01/2022    Procedure: VITRECTOMY, PARS PLANA APPROACH;  Surgeon: Maximilian Montaño MD;  Location: [facility] OR [address] [place] [address];   Service: Ophthalmology;  Laterality: Right;    VITRECTOMY BY PARS PLANA APPROACH Right 2022    Procedure: VITRECTOMY, PARS PLANA APPROACH;  Surgeon: Maximilian Montaño MD;  Location: [facility] OR [address] [place] [address];  Service: Ophthalmology;  Laterality: Right;       Social History     Socioeconomic History    Marital status: Single   Occupational History    Occupation:  at [employer]   Tobacco Use    Smoking status: Never    Smokeless tobacco: Never   Substance and Sexual Activity    Alcohol use: No    Drug use: No    Sexual activity: Not Currently     Partners: Male     Comment: monogamous   Social History Narrative    Caregiver        Single    No kids    Had Miscarriage  At 23 weeks from preeclampsia    Disabled     Social Determinants of Health     Financial Resource Strain: Low Risk  (2024)    Overall Financial Resource Strain (CARDIA)     Difficulty of Paying Living Expenses: Not hard at all   Food Insecurity: No Food Insecurity (2024)    Hunger Vital Sign     Worried About Running Out of Food in the Last Year: Never true     Ran Out of Food in the Last Year: Never true   Transportation Needs: No Transportation Needs (2024)    PRAPARE - Transportation     Lack of Transportation (Medical): No     Lack of Transportation (Non-Medical): No   Physical Activity: Sufficiently Active (2024)    Exercise Vital Sign     Days of Exercise per Week: 3 days     Minutes of Exercise per Session: 60 min   Stress: No Stress Concern Present (2024)    [employer] [employer] - Occupational Stress Questionnaire     Feeling of Stress : Not at all   Housing Stability: Unknown (2024)    Housing Stability Vital Sign     Unable to Pay for Housing in the Last Year: No     Unstable Housing in the Last Year: No         Diagnostic Studies:      EKD Echo:        Pre-op Assessment    I have reviewed the Patient Summary Reports.     I have reviewed the Nursing Notes.    I have reviewed the  Medications.     Review of Systems  Anesthesia Hx:  No problems with previous Anesthesia   History of prior surgery of interest to airway management or planning:            Denies Personal Hx of Anesthesia complications.                    Social:  Non-Smoker       Hematology/Oncology:  Hematology Normal   Oncology Normal                                   EENT/Dental:  EENT/Dental Normal           Cardiovascular:     Hypertension                                        Pulmonary:  Pulmonary Normal                       Hepatic/GI:  Hepatic/GI Normal                 Musculoskeletal:  Musculoskeletal Normal                Neurological:  Neurology Normal                                      Dermatological:  Skin Normal        Physical Exam  General: Well nourished, Cooperative and Alert    Airway:  Mallampati: II   Mouth Opening: Normal  TM Distance: Normal  Tongue: Normal  Neck ROM: Normal ROM    Dental:  Braces    Chest/Lungs:  Clear to auscultation        Anesthesia Plan  Type of Anesthesia, risks & benefits discussed:    Anesthesia Type: Gen Supraglottic Airway, Gen Natural Airway  Intra-op Monitoring Plan: Standard ASA Monitors  Post Op Pain Control Plan: multimodal analgesia  Induction:  IV  Informed Consent: Informed consent signed with the Patient and all parties understand the risks and agree with anesthesia plan.  All questions answered.   ASA Score: 3    Ready For Surgery From Anesthesia Perspective.     .

## 2024-07-16 NOTE — ANESTHESIA POSTPROCEDURE EVALUATION
Anesthesia Post Evaluation    Patient: Isabela Read    Procedure(s) Performed: Procedure(s) (LRB):  REMOVAL IMPLANT, POSTERIOR SEGMENT, INTRAOCULAR (Right)  PHOTOCOAGULATION, RETINA, USING LASER (Left)    Final Anesthesia Type: general      Patient location during evaluation: PACU  Patient participation: Yes- Able to Participate  Level of consciousness: awake and alert  Post-procedure vital signs: reviewed and stable  Pain management: adequate  Airway patency: patent    PONV status at discharge: No PONV  Anesthetic complications: no      Cardiovascular status: stable  Respiratory status: unassisted and spontaneous ventilation  Hydration status: euvolemic  Follow-up not needed.              Vitals Value Taken Time   /72 07/16/24 1046   Temp 36.6 °C (97.9 °F) 07/16/24 1045   Pulse 84 07/16/24 1056   Resp 9 07/16/24 1056   SpO2 100 % 07/16/24 1056   Vitals shown include unfiled device data.      No case tracking events are documented in the log.      Pain/Renny Score: Pain Rating Prior to Med Admin: 7 (7/16/2024 10:21 AM)  Renny Score: 9 (7/16/2024 10:30 AM)

## 2024-07-16 NOTE — TRANSFER OF CARE
Anesthesia Transfer of Care Note    Patient: Isabela Read    Procedure(s) Performed: Procedure(s) (LRB):  REMOVAL IMPLANT, POSTERIOR SEGMENT, INTRAOCULAR (Right)  PHOTOCOAGULATION, RETINA, USING LASER (Left)    Patient location: [facility]    Anesthesia Type: general    Transport from OR: Transported from OR on 6-10 L/min O2 by face mask with adequate spontaneous ventilation    Post pain: adequate analgesia    Post assessment: no apparent anesthetic complications    Post vital signs: stable    Level of consciousness: awake, alert and oriented    Nausea/Vomiting: no nausea/vomiting    Complications: none    Transfer of care protocol was followed      Last vitals: Visit Vitals  /80 (BP Location: Left arm, Patient Position: Lying)   Pulse 97   Temp 36.5 °C (97.7 °F) (Temporal)   Resp 16   LMP  (LMP Unknown)   SpO2 100%   Breastfeeding No

## 2024-07-16 NOTE — OP NOTE
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 The patient was properly identified and taken to the operating room. General anesthesia was begun by the anesthesia team.     Panretinal photocogulation was performed in the left eye with indirect laser.  Parameters were 100ms, 150 mW, 1258 spots to far periphery on the temporal side of the retina.  There is room for far peripheral PRP nasally if needed in the future.    Attention was turned to the right eye.  The right eye was prepped and draped in the standard ophthalmic fashion taking care to exclude the lashes from the operative field.    Standard 25 gauge vitrectomy setup was achieved with all ports 3.75 mm posterior to the limbus. The Tunezy visualization system was used. Silicone oil was removed using the viscous fluid extractor.  Three fluid/air exchanges were performed using the soft tip cannula to ensure that all silicone oil had been removed from the eye.   The eye was filled with balanced salt solution again. There were some inferior membranes extending from the ora serrate to the inferior scar peripheral to the inferior retinectomy edge. There was no traction.  These membranes were cut with the cutter and removed.  The retina was inspected for 360 degrees to the ora nora using scleral depression. There were no breaks  or detachments.  The inferior retinectomy edge was flat.  There were peripheral chorioretinal laser scars.     A temporal paracentesis was created with a sideport blade, and a superior triplanar clear cornea incision was created with a keratome.  The sulcus was opened by injecting viscoelastic and using the viscoelastic cannula to sever inferior posterior synechiae.  A 22.0 D MA60AC lens was injected and dialed into the sulcus.  The superior corneal wound was closed with two 10-0 nylon interrupted sutures.  The anterior chamber viscoelastic was evacuated using the cutter.  The paracentesis was hydrated.    The vitrectomy ports were removed. The infusion cannula was removed.  The sclerotomy wounds were sutured with 7-0 vicryl suture.  All wounds were checked and noted not to be leaking. The eye was normotensive. A subconjunctival injection of vancomycin and dexamethasone was placed.     The eye was patched. A Little shield was placed. The patient was awakened from general anesthesia by the anesthesia team having tolerated the procedure well.

## 2024-07-16 NOTE — DISCHARGE SUMMARY
[facility] (1st Fl)  Discharge Note  Short Stay    Date of Admission: 07/16/2024    Procedure(s) (LRB):  REMOVAL IMPLANT, POSTERIOR SEGMENT, INTRAOCULAR (Right)  PHOTOCOAGULATION, RETINA, USING LASER (Left)      OUTCOME: Patient tolerated treatment/procedure well without complication and is now ready for discharge.    DISPOSITION: Home or Self Care    FINAL DIAGNOSIS:  Proliferative diabetic retinopathy, both eyes.  Aphakia, right eye    FOLLOWUP: In clinic    DISCHARGE INSTRUCTIONS:    Discharge Procedure Orders   Diet general     Sponge bath only until clinic visit     Lifting restrictions     Leave dressing on - Keep it clean, dry, and intact until clinic visit     Call MD for:  temperature >100.4     Call MD for:  persistent nausea and vomiting     Call MD for:  severe uncontrolled pain     Call MD for:  difficulty breathing, headache or visual disturbances     Call MD for:  redness, tenderness, or signs of infection (pain, swelling, redness, odor or green/yellow discharge around incision site)     Call MD for:  hives     Call MD for:  persistent dizziness or light-headedness     Call MD for:  extreme fatigue     Call MD for:        TIME SPENT ON DISCHARGE: 15 minutes

## 2024-07-16 NOTE — ANESTHESIA PROCEDURE NOTES
Intubation    Date/Time: 7/16/2024 7:15 AM    Performed by: Emma Damon CRNA  Authorized by: Viola Abraham MD    Intubation:     Induction:  Intravenous    Intubated:  Postinduction    Mask Ventilation:  Not attempted    Attempts:  1    Attempted By:  CRNA    Method of Intubation:  Fast track LMA    Difficult Airway Encountered?: No      Complications:  None    Airway Device:  Supraglottic airway/LMA    Airway Device Size:  4.0 (airQ)    Placement Verified By:  Capnometry    Complicating Factors:  None    Findings Post-Intubation:  BS equal bilateral and atraumatic/condition of teeth unchanged

## 2024-07-17 ENCOUNTER — OFFICE VISIT (OUTPATIENT)
Dept: OPHTHALMOLOGY | Facility: CLINIC | Age: 29
End: 2024-07-17
Attending: OPHTHALMOLOGY
Payer: MEDICARE

## 2024-07-17 DIAGNOSIS — E10.3541: Primary | ICD-10-CM

## 2024-07-17 PROCEDURE — 3044F HG A1C LEVEL LT 7.0%: CPT | Mod: CPTII,S$GLB,, | Performed by: OPHTHALMOLOGY

## 2024-07-17 PROCEDURE — 1160F RVW MEDS BY RX/DR IN RCRD: CPT | Mod: CPTII,S$GLB,, | Performed by: OPHTHALMOLOGY

## 2024-07-17 PROCEDURE — 99024 POSTOP FOLLOW-UP VISIT: CPT | Mod: S$GLB,,, | Performed by: OPHTHALMOLOGY

## 2024-07-17 PROCEDURE — 3066F NEPHROPATHY DOC TX: CPT | Mod: CPTII,S$GLB,, | Performed by: OPHTHALMOLOGY

## 2024-07-17 PROCEDURE — 1159F MED LIST DOCD IN RCRD: CPT | Mod: CPTII,S$GLB,, | Performed by: OPHTHALMOLOGY

## 2024-07-17 PROCEDURE — 99999 PR PBB SHADOW E&M-EST. PATIENT-LVL III: CPT | Mod: PBBFAC,,, | Performed by: OPHTHALMOLOGY

## 2024-07-17 NOTE — PROGRESS NOTES
Subjective:       Patient ID: Isabela Read is a 29 y.o. female      Chief Complaint   Patient presents with    Post-op Evaluation     History of Present Illness  HPI    Post op Day 1 OD   Pt states she took Tylenol this morning before pain started.    She states her neck was stiff but it has sense relaxed.     Eye meds:     Pf:   Mox:   At:     Last edited by Aurelia Arambula on 7/17/2024  9:20 AM.        Imaging:    See report    Assessment/Plan:     1. Type 1 diabetes mellitus with right eye affected by proliferative retinopathy and combined traction and rhegmatogenous retinal detachment  - Posterior Segment OCT Retina-Both eyes  2. Proliferative vitreoretinopathy of right eye  3. Aphakia, right  4. Silicone oil in eye after vitrectomy  Retina attached.    Doing well POD#1, IOP elevated  PF 4/0  Vig 4/0  Combigan 2/0  Maxitrol matilde HS/0 PRN  No vigorous activity, no lifting, bending, etc.  Little shield sleeping  Little shield, glasses, or sunglasses all times for protection   No shower   RD/Endophthalmitis precautions   RTC 1 wk sooner PRN if any problems (laure pain, redness, dimming or loss of vision)      Follow up if symptoms worsen or fail to improve, for Post-op.

## 2024-07-25 ENCOUNTER — OFFICE VISIT (OUTPATIENT)
Dept: OPHTHALMOLOGY | Facility: CLINIC | Age: 29
End: 2024-07-25
Payer: MEDICARE

## 2024-07-25 DIAGNOSIS — H35.21 PROLIFERATIVE VITREORETINOPATHY OF RIGHT EYE: ICD-10-CM

## 2024-07-25 DIAGNOSIS — H27.01 APHAKIA, RIGHT: ICD-10-CM

## 2024-07-25 DIAGNOSIS — E10.3541: Primary | ICD-10-CM

## 2024-07-25 PROCEDURE — 1159F MED LIST DOCD IN RCRD: CPT | Mod: CPTII,S$GLB,, | Performed by: OPHTHALMOLOGY

## 2024-07-25 PROCEDURE — 99024 POSTOP FOLLOW-UP VISIT: CPT | Mod: S$GLB,,, | Performed by: OPHTHALMOLOGY

## 2024-07-25 PROCEDURE — 3066F NEPHROPATHY DOC TX: CPT | Mod: CPTII,S$GLB,, | Performed by: OPHTHALMOLOGY

## 2024-07-25 PROCEDURE — 3044F HG A1C LEVEL LT 7.0%: CPT | Mod: CPTII,S$GLB,, | Performed by: OPHTHALMOLOGY

## 2024-07-25 PROCEDURE — 1160F RVW MEDS BY RX/DR IN RCRD: CPT | Mod: CPTII,S$GLB,, | Performed by: OPHTHALMOLOGY

## 2024-07-25 PROCEDURE — 99999 PR PBB SHADOW E&M-EST. PATIENT-LVL II: CPT | Mod: PBBFAC,,, | Performed by: OPHTHALMOLOGY

## 2024-07-26 NOTE — PROGRESS NOTES
Subjective:       Patient ID: Isabela Read is a 29 y.o. female      Chief Complaint   Patient presents with    Post-op Evaluation     History of Present Illness  HPI    1 week post op     Pt states she is doing well   No pain   Vision improved a little, now similar to pre op vision    Complaint with drops     Eye meds:   PF : 4/0  MOX: 4/0  Combigan: 2/0    Last edited by Maximilian Montaño MD on 7/26/2024 12:57 PM.        Imaging:    See report    Assessment/Plan:     1. Type 1 diabetes mellitus with right eye affected by proliferative retinopathy and combined traction and rhegmatogenous retinal detachment  - Posterior Segment OCT Retina-Both eyes  2. Proliferative vitreoretinopathy of right eye  3. Aphakia, right  4. Silicone oil in eye after vitrectomy  Retina attached.    Doing well POD#9, IOP elevated  PF 4/0  Vig d/c  Combigan 1/0  RD/Endophthalmitis precautions   RTC 3 wks sooner PRN if any problems (laure pain, redness, dimming or loss of vision)    Follow up in about 3 weeks (around 8/15/2024), or if symptoms worsen or fail to improve, for Post-op.

## 2024-07-29 ENCOUNTER — LAB VISIT (OUTPATIENT)
Dept: LAB | Facility: HOSPITAL | Age: 29
End: 2024-07-29
Attending: INTERNAL MEDICINE
Payer: MEDICARE

## 2024-07-29 DIAGNOSIS — Z94.0 KIDNEY REPLACED BY TRANSPLANT: ICD-10-CM

## 2024-07-29 DIAGNOSIS — E10.21 TYPE 1 DIABETES MELLITUS WITH DIABETIC NEPHROPATHY: ICD-10-CM

## 2024-07-29 DIAGNOSIS — Z94.83 STATUS POST PANCREAS TRANSPLANTATION: ICD-10-CM

## 2024-07-29 LAB
ALBUMIN SERPL BCP-MCNC: 4 G/DL (ref 3.5–5.2)
ALBUMIN SERPL BCP-MCNC: 4 G/DL (ref 3.5–5.2)
ALP SERPL-CCNC: 65 U/L (ref 55–135)
ALT SERPL W/O P-5'-P-CCNC: 14 U/L (ref 10–44)
AMYLASE SERPL-CCNC: 96 U/L (ref 20–110)
ANION GAP SERPL CALC-SCNC: 8 MMOL/L (ref 8–16)
AST SERPL-CCNC: 11 U/L (ref 10–40)
BASOPHILS # BLD AUTO: 0.02 K/UL (ref 0–0.2)
BASOPHILS NFR BLD: 0.4 % (ref 0–1.9)
BILIRUB DIRECT SERPL-MCNC: 0.2 MG/DL (ref 0.1–0.3)
BILIRUB SERPL-MCNC: 0.5 MG/DL (ref 0.1–1)
BUN SERPL-MCNC: 20 MG/DL (ref 6–20)
CALCIUM SERPL-MCNC: 9.9 MG/DL (ref 8.7–10.5)
CHLORIDE SERPL-SCNC: 113 MMOL/L (ref 95–110)
CO2 SERPL-SCNC: 19 MMOL/L (ref 23–29)
CREAT SERPL-MCNC: 1 MG/DL (ref 0.5–1.4)
DIFFERENTIAL METHOD BLD: ABNORMAL
EOSINOPHIL # BLD AUTO: 0.1 K/UL (ref 0–0.5)
EOSINOPHIL NFR BLD: 2.5 % (ref 0–8)
ERYTHROCYTE [DISTWIDTH] IN BLOOD BY AUTOMATED COUNT: 13.9 % (ref 11.5–14.5)
EST. GFR  (NO RACE VARIABLE): >60 ML/MIN/1.73 M^2
ESTIMATED AVG GLUCOSE: 114 MG/DL (ref 68–131)
GLUCOSE SERPL-MCNC: 88 MG/DL (ref 70–110)
HBA1C MFR BLD: 5.6 % (ref 4–5.6)
HCT VFR BLD AUTO: 37.5 % (ref 37–48.5)
HGB BLD-MCNC: 11.7 G/DL (ref 12–16)
IMM GRANULOCYTES # BLD AUTO: 0.01 K/UL (ref 0–0.04)
IMM GRANULOCYTES NFR BLD AUTO: 0.2 % (ref 0–0.5)
LIPASE SERPL-CCNC: 19 U/L (ref 4–60)
LYMPHOCYTES # BLD AUTO: 1.4 K/UL (ref 1–4.8)
LYMPHOCYTES NFR BLD: 30.3 % (ref 18–48)
MAGNESIUM SERPL-MCNC: 1.5 MG/DL (ref 1.6–2.6)
MCH RBC QN AUTO: 28.3 PG (ref 27–31)
MCHC RBC AUTO-ENTMCNC: 31.2 G/DL (ref 32–36)
MCV RBC AUTO: 91 FL (ref 82–98)
MONOCYTES # BLD AUTO: 0.6 K/UL (ref 0.3–1)
MONOCYTES NFR BLD: 11.7 % (ref 4–15)
NEUTROPHILS # BLD AUTO: 2.6 K/UL (ref 1.8–7.7)
NEUTROPHILS NFR BLD: 54.9 % (ref 38–73)
NRBC BLD-RTO: 0 /100 WBC
PHOSPHATE SERPL-MCNC: 3 MG/DL (ref 2.7–4.5)
PLATELET # BLD AUTO: 271 K/UL (ref 150–450)
PMV BLD AUTO: 10.8 FL (ref 9.2–12.9)
POTASSIUM SERPL-SCNC: 4.1 MMOL/L (ref 3.5–5.1)
PROT SERPL-MCNC: 7.2 G/DL (ref 6–8.4)
RBC # BLD AUTO: 4.14 M/UL (ref 4–5.4)
SODIUM SERPL-SCNC: 140 MMOL/L (ref 136–145)
TACROLIMUS BLD-MCNC: 8.8 NG/ML (ref 5–15)
WBC # BLD AUTO: 4.72 K/UL (ref 3.9–12.7)

## 2024-07-29 PROCEDURE — 83690 ASSAY OF LIPASE: CPT | Performed by: INTERNAL MEDICINE

## 2024-07-29 PROCEDURE — 84155 ASSAY OF PROTEIN SERUM: CPT | Performed by: INTERNAL MEDICINE

## 2024-07-29 PROCEDURE — 80197 ASSAY OF TACROLIMUS: CPT | Performed by: INTERNAL MEDICINE

## 2024-07-29 PROCEDURE — 83735 ASSAY OF MAGNESIUM: CPT | Performed by: INTERNAL MEDICINE

## 2024-07-29 PROCEDURE — 36415 COLL VENOUS BLD VENIPUNCTURE: CPT | Performed by: INTERNAL MEDICINE

## 2024-07-29 PROCEDURE — 85025 COMPLETE CBC W/AUTO DIFF WBC: CPT | Performed by: INTERNAL MEDICINE

## 2024-07-29 PROCEDURE — 80069 RENAL FUNCTION PANEL: CPT | Performed by: INTERNAL MEDICINE

## 2024-07-29 PROCEDURE — 82150 ASSAY OF AMYLASE: CPT | Performed by: INTERNAL MEDICINE

## 2024-07-29 PROCEDURE — 83036 HEMOGLOBIN GLYCOSYLATED A1C: CPT | Performed by: INTERNAL MEDICINE

## 2024-07-29 PROCEDURE — 84450 TRANSFERASE (AST) (SGOT): CPT | Performed by: INTERNAL MEDICINE

## 2024-07-30 LAB
CMV DNA SPEC QL NAA+PROBE: NORMAL
CYTOMEGALOVIRUS PCR, QUANT: NOT DETECTED IU/ML

## 2024-08-20 ENCOUNTER — OFFICE VISIT (OUTPATIENT)
Dept: PODIATRY | Facility: CLINIC | Age: 29
End: 2024-08-20
Payer: MEDICARE

## 2024-08-20 VITALS
HEART RATE: 103 BPM | SYSTOLIC BLOOD PRESSURE: 95 MMHG | DIASTOLIC BLOOD PRESSURE: 62 MMHG | BODY MASS INDEX: 27.96 KG/M2 | HEIGHT: 64 IN | WEIGHT: 163.81 LBS

## 2024-08-20 DIAGNOSIS — E10.42 TYPE 1 DIABETES MELLITUS WITH DIABETIC POLYNEUROPATHY: Primary | ICD-10-CM

## 2024-08-20 DIAGNOSIS — S92.301D MULTIPLE CLOSED FRACTURES OF METATARSAL BONE OF RIGHT FOOT WITH ROUTINE HEALING, SUBSEQUENT ENCOUNTER: ICD-10-CM

## 2024-08-20 DIAGNOSIS — M14.679 CHARCOT ARTHROPATHY OF MIDFOOT: ICD-10-CM

## 2024-08-20 PROCEDURE — 99999 PR PBB SHADOW E&M-EST. PATIENT-LVL III: CPT | Mod: PBBFAC,,, | Performed by: PODIATRIST

## 2024-08-20 PROCEDURE — 3074F SYST BP LT 130 MM HG: CPT | Mod: CPTII,S$GLB,, | Performed by: PODIATRIST

## 2024-08-20 PROCEDURE — 99203 OFFICE O/P NEW LOW 30 MIN: CPT | Mod: S$GLB,,, | Performed by: PODIATRIST

## 2024-08-20 PROCEDURE — 3066F NEPHROPATHY DOC TX: CPT | Mod: CPTII,S$GLB,, | Performed by: PODIATRIST

## 2024-08-20 PROCEDURE — 1159F MED LIST DOCD IN RCRD: CPT | Mod: CPTII,S$GLB,, | Performed by: PODIATRIST

## 2024-08-20 PROCEDURE — 3008F BODY MASS INDEX DOCD: CPT | Mod: CPTII,S$GLB,, | Performed by: PODIATRIST

## 2024-08-20 PROCEDURE — 3044F HG A1C LEVEL LT 7.0%: CPT | Mod: CPTII,S$GLB,, | Performed by: PODIATRIST

## 2024-08-20 PROCEDURE — 3078F DIAST BP <80 MM HG: CPT | Mod: CPTII,S$GLB,, | Performed by: PODIATRIST

## 2024-08-20 NOTE — PROGRESS NOTES
Subjective:      Patient ID: Isabela Read is a 29 y.o. female.    Chief Complaint: Diabetic Foot Exam and Peripheral Neuropathy    Diabetes, increased risk amputation needing evaluation/management/optomization of foot care.    No complaints.      Chief Complaint   Patient presents with    Diabetic Foot Exam    Peripheral Neuropathy       Casual shoes both feet      Review of Systems   Constitutional: Negative for chills, diaphoresis, fever, malaise/fatigue and night sweats.   Cardiovascular:  Negative for claudication, cyanosis, leg swelling and syncope.   Skin:  Negative for color change, dry skin, nail changes, rash, suspicious lesions and unusual hair distribution.   Musculoskeletal:  Positive for joint swelling. Negative for falls, joint pain, muscle cramps, muscle weakness and stiffness.   Gastrointestinal:  Negative for constipation, diarrhea, nausea and vomiting.   Neurological:  Positive for numbness, paresthesias and sensory change. Negative for brief paralysis, disturbances in coordination, focal weakness and tremors.         Objective:      Physical Exam  Musculoskeletal:      Comments: Slightly increased girth right midfoot compared to the left.  No gross deformity loss of function signs of acute trauma right or left lower extremity.  Patient has normal angle base and station gait adequately propulsive toe off bilateral   Skin:     Comments: Skin is normal age and health appropriate color, turgor, texture, and temperature bilateral lower extremities without ulceration, hyperpigmentation, discoloration, masses nodules or cords palpated.  No ecchymosis, erythema, edema, or cardinal signs of infection bilateral lower extremities.    Toenails normal color and trophic qualities.      Neurological:      Sensory: Sensory deficit present.      Comments: Negative tinel sign to percussion sural, superficial peroneal, deep peroneal, saphenous, and posterior tibial nerves right and left ankles and  feet.    Diminished/loss of protective sensation all toes bilateral to 10 gram monofilament.6/10 sensed.    Decreased/absent vibratory sensation bilateral feet to 128Hz tuning fork.    Intermittent stinging tingling burning pain in the 2nd toe of the right foot not present today.             Assessment:       Encounter Diagnoses   Name Primary?    Multiple closed fractures of metatarsal bone of right foot with routine healing, subsequent encounter     Charcot arthropathy of midfoot     Type 1 diabetes mellitus with diabetic polyneuropathy Yes         Plan:       Isabela was seen today for diabetic foot exam and peripheral neuropathy.    Diagnoses and all orders for this visit:    Type 1 diabetes mellitus with diabetic polyneuropathy    Multiple closed fractures of metatarsal bone of right foot with routine healing, subsequent encounter  -     Ambulatory referral/consult to Podiatry    Charcot arthropathy of midfoot  -     Ambulatory referral/consult to Podiatry      I counseled the patient on her conditions, their implications and medical management.        The patient has received literature on basic diabetic foot care.  Patient will inspect feet daily, wear protective shoe gear when ambulatory, and apply moisturizer to skin as needed to maintain elasticity and help prevent ulceration.    Discussed conservative treatment with shoes of adequate dimensions, material, and style to alleviate symptoms and delay or prevent surgical intervention.    Inspect feet multiple times daily for signs of occurrence/recurrence ulceration.    Fill prescription depth shoes custom inserts (diabetic shoe gear) from Dr. Charles.      Keep all follow-up with Dr. Charles/orthopedics/metatarsal fractures/Lisfranc's injury.                  No follow-ups on file.

## 2024-09-03 ENCOUNTER — OFFICE VISIT (OUTPATIENT)
Dept: INTERNAL MEDICINE | Facility: CLINIC | Age: 29
End: 2024-09-03
Attending: FAMILY MEDICINE
Payer: MEDICARE

## 2024-09-03 VITALS
HEART RATE: 62 BPM | WEIGHT: 173.75 LBS | BODY MASS INDEX: 29.66 KG/M2 | HEIGHT: 64 IN | SYSTOLIC BLOOD PRESSURE: 108 MMHG | DIASTOLIC BLOOD PRESSURE: 72 MMHG

## 2024-09-03 DIAGNOSIS — S93.324A LISFRANC DISLOCATION, RIGHT, INITIAL ENCOUNTER: ICD-10-CM

## 2024-09-03 DIAGNOSIS — Z94.83 PANCREAS REPLACED BY TRANSPLANT: Chronic | ICD-10-CM

## 2024-09-03 DIAGNOSIS — R07.9 CHEST PAIN, UNSPECIFIED TYPE: ICD-10-CM

## 2024-09-03 DIAGNOSIS — K31.84 GASTROPARESIS: ICD-10-CM

## 2024-09-03 DIAGNOSIS — Z00.00 ANNUAL PHYSICAL EXAM: Primary | ICD-10-CM

## 2024-09-03 DIAGNOSIS — Z79.52 LONG TERM SYSTEMIC STEROID USER: Chronic | ICD-10-CM

## 2024-09-03 DIAGNOSIS — Z94.0 STATUS POST DECEASED-DONOR KIDNEY TRANSPLANTATION: Chronic | ICD-10-CM

## 2024-09-03 DIAGNOSIS — I50.30 HEART FAILURE WITH PRESERVED EJECTION FRACTION, UNSPECIFIED HF CHRONICITY: Chronic | ICD-10-CM

## 2024-09-03 DIAGNOSIS — E78.5 HYPERLIPIDEMIA, UNSPECIFIED HYPERLIPIDEMIA TYPE: ICD-10-CM

## 2024-09-03 PROBLEM — F32.9 MAJOR DEPRESSIVE DISORDER, SINGLE EPISODE, UNSPECIFIED: Status: RESOLVED | Noted: 2021-05-28 | Resolved: 2024-09-03

## 2024-09-03 PROBLEM — T38.0X5A STEROID-INDUCED HYPERGLYCEMIA: Status: RESOLVED | Noted: 2022-12-05 | Resolved: 2024-09-03

## 2024-09-03 PROBLEM — N04.9 NEPHROTIC SYNDROME: Chronic | Status: RESOLVED | Noted: 2021-03-04 | Resolved: 2024-09-03

## 2024-09-03 PROBLEM — R73.9 STEROID-INDUCED HYPERGLYCEMIA: Status: RESOLVED | Noted: 2022-12-05 | Resolved: 2024-09-03

## 2024-09-03 PROCEDURE — 3078F DIAST BP <80 MM HG: CPT | Mod: CPTII,S$GLB,, | Performed by: FAMILY MEDICINE

## 2024-09-03 PROCEDURE — 3066F NEPHROPATHY DOC TX: CPT | Mod: CPTII,S$GLB,, | Performed by: FAMILY MEDICINE

## 2024-09-03 PROCEDURE — 3008F BODY MASS INDEX DOCD: CPT | Mod: CPTII,S$GLB,, | Performed by: FAMILY MEDICINE

## 2024-09-03 PROCEDURE — 1160F RVW MEDS BY RX/DR IN RCRD: CPT | Mod: CPTII,S$GLB,, | Performed by: FAMILY MEDICINE

## 2024-09-03 PROCEDURE — 99395 PREV VISIT EST AGE 18-39: CPT | Mod: S$GLB,,, | Performed by: FAMILY MEDICINE

## 2024-09-03 PROCEDURE — 3044F HG A1C LEVEL LT 7.0%: CPT | Mod: CPTII,S$GLB,, | Performed by: FAMILY MEDICINE

## 2024-09-03 PROCEDURE — 1159F MED LIST DOCD IN RCRD: CPT | Mod: CPTII,S$GLB,, | Performed by: FAMILY MEDICINE

## 2024-09-03 PROCEDURE — 99999 PR PBB SHADOW E&M-EST. PATIENT-LVL V: CPT | Mod: PBBFAC,,, | Performed by: FAMILY MEDICINE

## 2024-09-03 PROCEDURE — 3074F SYST BP LT 130 MM HG: CPT | Mod: CPTII,S$GLB,, | Performed by: FAMILY MEDICINE

## 2024-09-03 NOTE — PROGRESS NOTES
Subjective:       Patient ID: Isabela Read is a 29 y.o. female.    Chief Complaint: Annual Exam    Established patient for an annual wellness check/physical exam and also chronic disease management. Specific complaints - see dictation, M*model entries and please see ROS.  Past, Surgical, Family, Social Histories; Medications, Allergies reviewed and reconciled.  Health maintenance file reviewed and addressed items due. Recent applicable lab, imaging and cardiovascular results reviewed.  Problem list items reviewed and modified or added entries (in the overview section) may not be transcribed into this encounter note due to note writer format.      Reports a vague chest discomfort that occurs at times.  Not necessarily exertionally related.  May have a GI component though no definite dysphagia is noted.  No diaphoresis, syncope, near-syncope or radiation.        Review of Systems   Constitutional:  Negative for appetite change, chills, diaphoresis, fatigue and fever.   HENT:  Negative for congestion, postnasal drip, rhinorrhea, sore throat and trouble swallowing.    Eyes:  Negative for visual disturbance.   Respiratory:  Negative for cough, choking, chest tightness, shortness of breath and wheezing.    Cardiovascular:  Positive for chest pain. Negative for leg swelling.   Gastrointestinal:  Negative for abdominal distention, abdominal pain, diarrhea, nausea and vomiting.   Genitourinary:  Negative for difficulty urinating and hematuria.   Musculoskeletal:  Negative for arthralgias and myalgias.   Skin:  Negative for rash.   Neurological:  Negative for weakness, light-headedness and headaches.   Hematological:  Does not bruise/bleed easily.   Psychiatric/Behavioral:  Negative for decreased concentration and dysphoric mood.        Objective:      Physical Exam  Vitals and nursing note reviewed.   Constitutional:       Appearance: She is well-developed. She is not diaphoretic.   Eyes:      General: No scleral  icterus.  Neck:      Thyroid: No thyromegaly.      Vascular: No JVD.      Trachea: No tracheal deviation.   Cardiovascular:      Rate and Rhythm: Normal rate.      Heart sounds: Normal heart sounds. No murmur heard.     No friction rub. No gallop.   Pulmonary:      Effort: Pulmonary effort is normal. No respiratory distress.      Breath sounds: Normal breath sounds. No wheezing or rales.   Abdominal:      General: There is no distension or abdominal bruit.      Palpations: Abdomen is soft. There is no mass.      Tenderness: There is no abdominal tenderness. There is no guarding or rebound.   Musculoskeletal:      Cervical back: Normal range of motion and neck supple.      Right foot: Charcot foot present.        Feet:    Lymphadenopathy:      Cervical: No cervical adenopathy.   Skin:     General: Skin is warm and dry.      Findings: No erythema or rash.   Neurological:      Mental Status: She is alert and oriented to person, place, and time.      Cranial Nerves: No cranial nerve deficit.      Motor: No tremor.      Coordination: Coordination normal.      Gait: Gait normal.   Psychiatric:         Behavior: Behavior normal.         Thought Content: Thought content normal.         Judgment: Judgment normal.         Assessment:       1. Annual physical exam    2. Hyperlipidemia, unspecified hyperlipidemia type    3. Pancreas replaced by transplant    4. Status post -donor simultaneous pancreas kidney transplantation    5. Heart failure with preserved ejection fraction, unspecified HF chronicity    6. Chest pain, unspecified type    7. Gastroparesis    8. Long term systemic steroid user    9. Lisfranc dislocation, right, initial encounter        Plan:     Medication List with Changes/Refills   Current Medications    ACETAMINOPHEN (TYLENOL) 325 MG TABLET    Take 1 tablet (325 mg total) by mouth every 6 (six) hours as needed for Pain.    KETOCONAZOLE (NIZORAL) 2 % CREAM    Apply topically 2 (two) times daily as  needed for dry areas of face    MEDROXYPROGESTERONE (DEPO-PROVERA) 150 MG/ML SYRG    Inject 1 mL (150 mg total) into the muscle every 3 (three) months.    MYCOPHENOLATE (CELLCEPT) 250 MG CAP    Take 3 capsules (750 mg total) by mouth 2 (two) times daily. Resume after finishing antibiotic    PREDNISONE (DELTASONE) 5 MG TABLET    Take 1 tablet (5 mg total) by mouth once daily.    PROMETHAZINE (PHENERGAN) 12.5 MG TAB    Take 1 tablet (12.5 mg total) by mouth every 6 (six) hours as needed (nausea).    SODIUM BICARBONATE 650 MG TABLET    Take 2 tablets (1,300 mg total) by mouth 3 (three) times daily.    TACROLIMUS (PROGRAF) 1 MG CAP    Take 4 capsules (4 mg total) by mouth every 12 (twelve) hours.    TRETINOIN (RETIN-A) 0.05 % CREAM    Apply topically every evening. Start with every other night and move up to nightly after 2 weeks if not too dry.    TRETINOIN (RETIN-A) 0.1 % CREAM    Apply topically every evening. Increase Retin A to 0.1%.  Rotate this with old one until weaker strength runs out and then go nightly with 0.1%.     1. Annual physical exam  -     Lipid Panel; Future; Expected date: 2024    2. Hyperlipidemia, unspecified hyperlipidemia type  -     Lipid Panel; Future; Expected date: 2024    3. Pancreas replaced by transplant  Overview:  2022      4. Status post -donor simultaneous pancreas kidney transplantation  Overview:  2022      5. Heart failure with preserved ejection fraction, unspecified HF chronicity  Assessment & Plan:  -2022 Echo not reported on 2022 Echo      6. Chest pain, unspecified type  -     Ambulatory referral/consult to Cardiology; Future; Expected date: 09/10/2024  -     EKG 12-lead; Future  -     Ambulatory referral/consult to Gastroenterology; Future; Expected date: 09/10/2024    7. Gastroparesis  Assessment & Plan:  -previous dx related to DM    Orders:  -     Ambulatory referral/consult to Gastroenterology; Future; Expected date: 09/10/2024    8.  Long term systemic steroid user  -     DXA Bone Density Axial Skeleton 1 or more sites; Future; Expected date: 09/03/2024  -     Ambulatory referral/consult to Endocrinology; Future; Expected date: 09/10/2024    9. Lisfranc dislocation, right, initial encounter  -     DXA Bone Density Axial Skeleton 1 or more sites; Future; Expected date: 09/03/2024  -     Ambulatory referral/consult to Endocrinology; Future; Expected date: 09/10/2024      See meds, orders, follow up, routing and instructions sections of encounter and AVS. Discussed with patient and provided on AVS.    Discussed diet and exercise as therapeutic modalities for metabolic and other conditions. Provided patient information, which are included as links on the AVS for detailed information.    Lab Results   Component Value Date     07/29/2024    K 4.1 07/29/2024     (H) 07/29/2024    BUN 20 07/29/2024    CREATININE 1.0 07/29/2024    GLU 88 07/29/2024    HGBA1C 5.6 07/29/2024    MG 1.5 (L) 07/29/2024    AST 11 07/29/2024    ALT 14 07/29/2024    ALBUMIN 4.0 07/29/2024    ALBUMIN 4.0 07/29/2024    PROT 7.2 07/29/2024    BILITOT 0.5 07/29/2024    CHOL 152 02/07/2023    HDL 52 02/07/2023    LDLCALC 81.6 02/07/2023    TRIG 92 02/07/2023    WBC 4.72 07/29/2024    HGB 11.7 (L) 07/29/2024    HCT 37.5 07/29/2024    HCT 27 (L) 11/03/2022     07/29/2024    TSH 2.332 09/15/2022    BNP 11 02/04/2024    URICACID 6.3 (H) 02/04/2024

## 2024-09-10 ENCOUNTER — HOSPITAL ENCOUNTER (OUTPATIENT)
Dept: RADIOLOGY | Facility: OTHER | Age: 29
Discharge: HOME | End: 2024-09-10
Attending: FAMILY MEDICINE
Payer: MEDICARE

## 2024-09-10 DIAGNOSIS — Z79.52 LONG TERM SYSTEMIC STEROID USER: Chronic | ICD-10-CM

## 2024-09-10 DIAGNOSIS — S93.324A LISFRANC DISLOCATION, RIGHT, INITIAL ENCOUNTER: ICD-10-CM

## 2024-09-10 PROCEDURE — 77080 DXA BONE DENSITY AXIAL: CPT | Mod: TC

## 2024-09-10 PROCEDURE — 77080 DXA BONE DENSITY AXIAL: CPT | Mod: 26,,, | Performed by: RADIOLOGY

## 2024-09-24 DIAGNOSIS — Z30.9 ENCOUNTER FOR CONTRACEPTIVE MANAGEMENT, UNSPECIFIED TYPE: ICD-10-CM

## 2024-09-24 RX ORDER — MEDROXYPROGESTERONE ACETATE 150 MG/ML
150 INJECTION, SUSPENSION INTRAMUSCULAR
Qty: 1 ML | Refills: 3 | Status: CANCELLED | OUTPATIENT
Start: 2024-09-24

## 2024-09-30 DIAGNOSIS — Z94.83 STATUS POST SIMULTANEOUS KIDNEY AND PANCREAS TRANSPLANT: ICD-10-CM

## 2024-09-30 DIAGNOSIS — Z94.0 STATUS POST SIMULTANEOUS KIDNEY AND PANCREAS TRANSPLANT: ICD-10-CM

## 2024-10-01 RX ORDER — TACROLIMUS 1 MG/1
4 CAPSULE ORAL EVERY 12 HOURS
Qty: 240 CAPSULE | Refills: 11 | Status: SHIPPED | OUTPATIENT
Start: 2024-10-01 | End: 2025-10-01

## 2024-10-14 ENCOUNTER — OFFICE VISIT (OUTPATIENT)
Dept: CARDIOLOGY | Facility: CLINIC | Age: 29
End: 2024-10-14
Payer: MEDICARE

## 2024-10-14 VITALS
HEIGHT: 64 IN | SYSTOLIC BLOOD PRESSURE: 102 MMHG | RESPIRATION RATE: 15 BRPM | BODY MASS INDEX: 29.35 KG/M2 | HEART RATE: 105 BPM | DIASTOLIC BLOOD PRESSURE: 70 MMHG | WEIGHT: 171.94 LBS | OXYGEN SATURATION: 98 %

## 2024-10-14 DIAGNOSIS — I15.0 RENOVASCULAR HYPERTENSION: ICD-10-CM

## 2024-10-14 DIAGNOSIS — R07.89 OTHER CHEST PAIN: Primary | ICD-10-CM

## 2024-10-14 DIAGNOSIS — Z94.0 STATUS POST DECEASED-DONOR KIDNEY TRANSPLANTATION: Chronic | ICD-10-CM

## 2024-10-14 DIAGNOSIS — I50.30 HEART FAILURE WITH PRESERVED EJECTION FRACTION, UNSPECIFIED HF CHRONICITY: Chronic | ICD-10-CM

## 2024-10-14 DIAGNOSIS — D84.821 IMMUNODEFICIENCY DUE TO LONG TERM IMMUNOSUPPRESSIVE DRUG THERAPY: ICD-10-CM

## 2024-10-14 DIAGNOSIS — Z79.899 IMMUNODEFICIENCY DUE TO LONG TERM IMMUNOSUPPRESSIVE DRUG THERAPY: ICD-10-CM

## 2024-10-14 DIAGNOSIS — I27.20 PULMONARY HYPERTENSION: ICD-10-CM

## 2024-10-14 DIAGNOSIS — T45.1X5A IMMUNODEFICIENCY DUE TO LONG TERM IMMUNOSUPPRESSIVE DRUG THERAPY: ICD-10-CM

## 2024-10-14 PROCEDURE — 93000 ELECTROCARDIOGRAM COMPLETE: CPT | Mod: S$GLB,,, | Performed by: INTERNAL MEDICINE

## 2024-10-14 PROCEDURE — 99999 PR PBB SHADOW E&M-EST. PATIENT-LVL IV: CPT | Mod: PBBFAC,,, | Performed by: INTERNAL MEDICINE

## 2024-10-14 NOTE — PROGRESS NOTES
CARDIOLOGY CLINIC VISIT        HISTORY OF PRESENT ILLNESS:     10/14/2024: Isabela Read has a history of kidney/pancreas transplantation.  Heart failure with preserved ejection fraction.  Recently seen by her primary care physician with complaints of chest discomfort.  Symptoms over the past month.  Can occur at rest or with exertion.  Midsternal.  Last roughly 1 minute.  EKG today shows normal sinus rhythm.  Prior longstanding history of hypertension, diabetes. Resolution post transplant.    CARDIOVASCULAR HISTORY:     Heart failure with preserved ejection fraction  Pulmonary hypertension    PAST MEDICAL HISTORY:     Past Medical History:   Diagnosis Date    Acute cystitis without hematuria 11/25/2022    Acute kidney injury superimposed on chronic kidney disease 04/07/2021    Anemia     Anemia in ESRD (end-stage renal disease) 07/29/2020    Lab Results  Component Value Date   WBC 7.23 07/29/2020   HGB 7.1 (L) 07/29/2020   HCT 22.2 (L) 07/29/2020   MCV 91 07/29/2020    (H) 07/29/2020   Following with hematology and scheduled to undergo iron infusions    Bacteremia due to Escherichia coli 01/23/2023    Bilious vomiting with nausea 12/12/2022    Chronic hypertension with exacerbation during pregnancy in second trimester 11/06/2020    Current regimen (11/6/20):  - Carvedilol 12.5 mg BID - Nifedipine 30 mg daily Baseline CKD + proteinuria    Chronic kidney disease     Depression     Diabetes mellitus     Diabetic retinopathy     Diarrhea     Encounter for blood transfusion     End stage renal failure on dialysis     Fever 12/01/2022    Fever of undetermined origin 12/12/2022    Gastroparesis     Glaucoma     Hepatomegaly 04/29/2021    History of diabetes mellitus, type I 12/20/2022    Hx of psychiatric care     Hyperlipidemia     Hypertension     Hypertension, essential     Labile      Major depressive disorder, single episode, unspecified 05/28/2021    Nausea and vomiting 12/12/2022    Nephrotic syndrome      Nephrotic syndrome 03/04/2021    Nonspecific reaction to tuberculin skin test without active tuberculosis 10/01/2021    Orthostatic hypotension 01/25/2023    Palpitations     Poor fetal growth affecting management of mother in second trimester 10/15/2020    Pyelonephritis, acute 11/26/2022    Restrictive lung disease     Retinal detachment     Sepsis 12/01/2022    Sepsis 11/25/2022    Sepsis due to Escherichia coli 01/23/2023    Severe pre-eclampsia in second trimester 11/06/2020    Severe sepsis 11/25/2022    SIRS (systemic inflammatory response syndrome) 12/06/2022    Status post pancreas transplantation 11/26/2022 11/2/2022    Steroid-induced hyperglycemia 12/05/2022    Type 1 diabetes mellitus with end-stage renal disease (ESRD) 02/24/2015    Followed by Dr. Mayo.  Last A1C was 13  Currently on lantus 20 units qAM and humolog 10 units with meals  - a fetal echocardiogram around 22-24 weeks - needs eye exam, podiatry exam  - needs EKG, maternal echo and fu with cardiology  - ASA 81mg, folic acid 4mg PO daily - hemoglobin A1c every 4-6 weeks -24 hour urine for protein and creatinine clearance should be performed. Patient will also need a       PAST SURGICAL HISTORY:     Past Surgical History:   Procedure Laterality Date    AV FISTULA PLACEMENT Left 04/07/2021    Procedure: CREATION, AV FISTULA;  Surgeon: Roberto Ryan MD;  Location: [facility] OR [address] [place] [address];  Service: Peripheral Vascular;  Laterality: Left;    COLONOSCOPY N/A 03/16/2022    Procedure: COLONOSCOPY;  Surgeon: Tavo Kwok MD;  Location: [facility] (4TH FLR);  Service: Endoscopy;  Laterality: N/A;  Questionable history of delayed gastric emptying longstanding diabetes now on eating pre transplant workup for history of nausea vomiting which seems to have improved with dialysis also chronic diarrhea and history of anemia pre transplant workup for kidney transplant. 3    CYSTOSCOPY      ESOPHAGOGASTRODUODENOSCOPY N/A 03/16/2022    Procedure: EGD  (ESOPHAGOGASTRODUODENOSCOPY);  Surgeon: Tavo Kwok MD;  Location: [facility] ENDO (4TH FLR);  Service: Endoscopy;  Laterality: N/A;  Questionable history of delayed gastric emptying longstanding diabetes now on eating pre transplant workup for history of nausea vomiting which seems to have improved with dialysis also chronic diarrhea and history of anemia pre transplant workup for    FISTULOGRAM N/A 08/11/2021    Procedure: Fistulogram;  Surgeon: Roberto Ryan MD;  Location: [facility] CATH LAB;  Service: Cardiology;  Laterality: N/A;    KIDNEY TRANSPLANT Right 11/02/2022    Procedure: TRANSPLANT, KIDNEY;  Surgeon: Kumar Rodriges Jr., MD;  Location: [facility] OR 2ND FLR;  Service: Transplant;  Laterality: Right;    LASER PHOTOCOAGULATION OF RETINA Right 05/31/2022    Procedure: PHOTOCOAGULATION, RETINA, USING LASER;  Surgeon: Maximilian Montaño MD;  Location: [facility] OR 1ST FLR;  Service: Ophthalmology;  Laterality: Right;    LASER PHOTOCOAGULATION OF RETINA Left 7/16/2024    Procedure: PHOTOCOAGULATION, RETINA, USING LASER;  Surgeon: Maximilian Montaño MD;  Location: [facility] OR 1ST FLR;  Service: Ophthalmology;  Laterality: Left;    PERCUTANEOUS TRANSLUMINAL ANGIOPLASTY OF ARTERIOVENOUS FISTULA N/A 08/11/2021    Procedure: PTA, AV FISTULA;  Surgeon: Roberto Ryan MD;  Location: [facility] CATH LAB;  Service: Cardiology;  Laterality: N/A;    REMOVAL IMPLANT, POSTERIOR SEGMENT, INTRAOCULAR Right 02/01/2022    Procedure: REMOVAL IMPLANT, POSTERIOR SEGMENT, INTRAOCULAR;  Surgeon: Maximilian Montaño MD;  Location: [facility] OR 1ST FLR;  Service: Ophthalmology;  Laterality: Right;    REMOVAL IMPLANT, POSTERIOR SEGMENT, INTRAOCULAR Right 7/16/2024    Procedure: REMOVAL IMPLANT, POSTERIOR SEGMENT, INTRAOCULAR;  Surgeon: Maximilian Montaño MD;  Location: [facility] OR 1ST FLR;  Service: Ophthalmology;  Laterality: Right;  25 ga PPV/SOR/sulcus IOL OD and PRP OS  GA  Dr Dahl requested MA60AC 22.0 D Lens HD    REPAIR OF RETINAL  DETACHMENT WITH VITRECTOMY Right 01/25/2022    Procedure: REPAIR, RETINAL DETACHMENT, WITH VITRECTOMY, MEMBRANE PEEL, LASER, INJECTION OF GAS VS OIL;  Surgeon: Maximilian Montaño MD;  Location: [facility] OR 1ST FLR;  Service: Ophthalmology;  Laterality: Right;    REPAIR, RETINAL DETACHMENT, COMPLEX, WITH VITRECTOMY AND MEMBRANE PEELING Right 3/21/2023    Procedure: REPAIR,RETINAL DETACHMENT,COMPLEX,WITH VITRECTOMY AND MEMBRANE PEELING;  Surgeon: Maximilian Montaño MD;  Location: [facility] OR 1ST FLR;  Service: Ophthalmology;  Laterality: Right;  25ga    REVISION OF ARTERIOVENOUS FISTULA Left 12/10/2021    Procedure: REVISION, AV FISTULA with BVT;  Surgeon: Roberto Ryan MD;  Location: [facility] OR 2ND FLR;  Service: Peripheral Vascular;  Laterality: Left;    TRANSPLANTATION OF PANCREAS N/A 11/02/2022    Procedure: TRANSPLANT, PANCREAS;  Surgeon: Kumar Rodriges Jr., MD;  Location: [facility] OR 2ND FLR;  Service: Transplant;  Laterality: N/A;    VITRECTOMY BY PARS PLANA APPROACH Right 02/01/2022    Procedure: VITRECTOMY, PARS PLANA APPROACH;  Surgeon: Maximilian Montaño MD;  Location: [facility] OR [facility] FLR;  Service: Ophthalmology;  Laterality: Right;    VITRECTOMY BY PARS PLANA APPROACH Right 05/31/2022    Procedure: VITRECTOMY, PARS PLANA APPROACH;  Surgeon: Maximilian Montaño MD;  [facility];  Service: Ophthalmology;  Laterality: Right;       ALLERGIES AND MEDICATION:   Review of patient's allergies indicates:  No Known Allergies     Medication List            Accurate as of October 14, 2024 12:31 PM. If you have any questions, ask your nurse or doctor.                CONTINUE taking these medications      ketoconazole 2 % cream  Commonly known as: NIZORAL  Apply topically 2 (two) times daily as needed for dry areas of face     medroxyPROGESTERone 150 mg/mL Syrg  Commonly known as: DEPO-PROVERA  Inject 1 mL (150 mg total) into the muscle every 3 (three) months.     mycophenolate 250 mg Cap  Commonly known  as: CELLCEPT  Take 3 capsules (750 mg total) by mouth 2 (two) times daily. Resume after finishing antibiotic     predniSONE 5 MG tablet  Commonly known as: DELTASONE  Take 1 tablet (5 mg total) by mouth once daily.     promethazine 12.5 MG Tab  Commonly known as: PHENERGAN  Take 1 tablet (12.5 mg total) by mouth every 6 (six) hours as needed (nausea).     sodium bicarbonate 650 MG tablet  Take 2 tablets (1,300 mg total) by mouth 3 (three) times daily.     tacrolimus 1 MG Cap  Commonly known as: PROGRAF  Take 4 capsules (4 mg total) by mouth every 12 (twelve) hours.     * tretinoin 0.05 % cream  Commonly known as: RETIN-A  Apply topically every evening. Start with every other night and move up to nightly after 2 weeks if not too dry.     * tretinoin 0.1 % cream  Commonly known as: RETIN-A  Apply topically every evening. Increase Retin A to 0.1%.  Rotate this with old one until weaker strength runs out and then go nightly with 0.1%.           * This list has 2 medication(s) that are the same as other medications prescribed for you. Read the directions carefully, and ask your doctor or other care provider to review them with you.                  SOCIAL HISTORY:     Social History     Socioeconomic History    Marital status: Single   Occupational History    Occupation:  at [employer]   Tobacco Use    Smoking status: Never    Smokeless tobacco: Never   Substance and Sexual Activity    Alcohol use: No    Drug use: No    Sexual activity: Not Currently     Partners: Male     Comment: monogamous   Social History Narrative    Caregiver        Single    No kids    Had Miscarriage  At 23 weeks from preeclampsia    Disabled     Social Drivers of Health     Financial Resource Strain: Low Risk  (4/7/2024)    Overall Financial Resource Strain (CARDIA)     Difficulty of Paying Living Expenses: Not hard at all   Food Insecurity: No Food Insecurity (4/7/2024)    Hunger Vital Sign     Worried About Running Out of Food in the Last  Year: Never true     Ran Out of Food in the Last Year: Never true   Transportation Needs: No Transportation Needs (4/7/2024)    PRAPARE - Transportation     Lack of Transportation (Medical): No     Lack of Transportation (Non-Medical): No   Physical Activity: Sufficiently Active (4/7/2024)    Exercise Vital Sign     Days of Exercise per Week: 3 days     Minutes of Exercise per Session: 60 min   Stress: No Stress Concern Present (4/7/2024)    [employer] [employer] - Occupational Stress Questionnaire     Feeling of Stress : Not at all   Housing Stability: Unknown (4/7/2024)    Housing Stability Vital Sign     Unable to Pay for Housing in the Last Year: No     Unstable Housing in the Last Year: No       FAMILY HISTORY:     Family History   Problem Relation Name Age of Onset    Hypertension Mother      Heart disease Father      Diabetes Brother      Celiac disease Neg Hx      Cirrhosis Neg Hx      Colon cancer Neg Hx      Colon polyps Neg Hx      Crohn's disease Neg Hx      Inflammatory bowel disease Neg Hx      Liver cancer Neg Hx      Liver disease Neg Hx      Rectal cancer Neg Hx      Stomach cancer Neg Hx      Ulcerative colitis Neg Hx      Esophageal cancer Neg Hx      Hemochromatosis Neg Hx      Pancreatic cancer Neg Hx      Kidney cancer Neg Hx      Bladder Cancer Neg Hx      Uterine cancer Neg Hx      Ovarian cancer Neg Hx         REVIEW OF SYSTEMS:   Review of Systems   Constitutional:  Negative for chills, diaphoresis, fever, malaise/fatigue and weight loss.   HENT:  Negative for congestion, hearing loss, sinus pain, sore throat and tinnitus.    Eyes:  Negative for blurred vision, double vision, photophobia and pain.   Respiratory:  Negative for cough, hemoptysis, sputum production, shortness of breath, wheezing and stridor.    Cardiovascular:  Positive for chest pain. Negative for palpitations, orthopnea, claudication, leg swelling and PND.   Gastrointestinal:  Negative for abdominal pain,  "blood in stool, heartburn, melena, nausea and vomiting.   Musculoskeletal:  Negative for back pain, falls, joint pain, myalgias and neck pain.   Neurological:  Negative for dizziness, tingling, tremors, sensory change, speech change, focal weakness, seizures, loss of consciousness, weakness and headaches.   Endo/Heme/Allergies:  Does not bruise/bleed easily.   Psychiatric/Behavioral:  Negative for depression, memory loss and substance abuse. The patient is not nervous/anxious.        PHYSICAL EXAM:     Vitals:    10/14/24 1156   BP: 102/70   Pulse: 105   Resp: 15    Body mass index is 29.52 kg/m².  Weight: 78 kg (171 lb 15.3 oz)   Height: 5' 4" (162.6 cm)     Physical Exam  Vitals reviewed.   Constitutional:       General: She is not in acute distress.     Appearance: Normal appearance. She is well-developed. She is not diaphoretic.   HENT:      Head: Normocephalic.   Neck:      Vascular: No carotid bruit or JVD.   Cardiovascular:      Rate and Rhythm: Normal rate and regular rhythm.      Pulses: Normal pulses.      Heart sounds: Normal heart sounds.   Pulmonary:      Effort: Pulmonary effort is normal.      Breath sounds: Normal breath sounds.   Abdominal:      General: Bowel sounds are normal.      Palpations: Abdomen is soft.      Tenderness: There is no abdominal tenderness.   Skin:     General: Skin is warm and dry.   Neurological:      Mental Status: She is alert and oriented to person, place, and time.   Psychiatric:         Speech: Speech normal.         Behavior: Behavior normal.         Thought Content: Thought content normal.         DATA:   EKG: (personally reviewed tracing)  10/14/2024-normal sinus rhythm  Results for orders placed or performed during the hospital encounter of 04/06/24   EKG 12-lead    Collection Time: 04/06/24  4:47 AM   Result Value Ref Range    QRS Duration 78 ms    OHS QTC Calculation 431 ms    Narrative    Test Reason : A41.9,    Vent. Rate : 118 BPM     Atrial Rate : 118 BPM     " P-R Int : 152 ms          QRS Dur : 078 ms      QT Int : 308 ms       P-R-T Axes : 063 060 060 degrees     QTc Int : 431 ms    Sinus tachycardia  Possible Left atrial enlargement  Borderline Abnormal ECG  When compared with ECG of 04-FEB-2024 15:54,  No significant change was found  Confirmed by Sohail SANZ MD ([id number]) on 4/6/2024 9:15:12 AM    Referred By: [email or web address]   SELF           Confirmed By:Sohail SANZ MD        Laboratory:  CBC:  Recent Labs   Lab 04/07/24  0716 04/29/24  1107 07/29/24  1108   WBC 11.17 5.00 4.72   Hemoglobin 9.9 L 11.4 L 11.7 L   Hematocrit 31.6 L 37.8 37.5   Platelets 215 318 271       CHEMISTRIES:  Recent Labs   Lab 05/18/22  1840 05/19/22  0557 05/25/22  1245 05/31/22  1034 11/13/23  0940 11/27/23  0949 01/08/24  1037 02/04/24  1619 04/06/24  0510 04/29/24  1107 07/29/24  1108   Glucose 417 H 210 H 172 H   < > 79   < > 73   < > 101 80 88   Sodium 132 L 136 138   < > 142   < > 138   < > 139 138 140   Potassium 6.0 H 5.2 H 5.3 H   < > 4.5   < > 4.5   < > 3.8 4.4 4.1   BUN 41 H 48 H 33 H   < > 31 H   < > 28 H   < > 26 H 27 H 20   Creatinine 6.4 H 6.9 H 5.9 H   < > 1.0   < > 1.0   < > 1.1 1.1 1.0   eGFR if African American 9.5 A 8.6 A 10.4 A  --   --   --   --   --   --   --   --    eGFR if non  8.2 A 7.5 A 9.1 A  --   --   --   --   --   --   --   --    Calcium 8.2 L 8.2 L 8.7   < > 9.7   < > 9.7   < > 9.6 10.4 9.9   Magnesium  --   --   --    < > 1.7  --  1.6  --   --   --  1.5 L    < > = values in this interval not displayed.       CARDIAC BIOMARKERS:  Recent Labs   Lab 12/04/22  0636 01/21/23  0659 05/28/23  0152 02/04/24  1619   CPK 19 L  --   --   --    Troponin I  --  0.008 <0.006 0.006       COAGS:  Recent Labs   Lab 05/08/22  1606 11/02/22  0038 12/07/22  1254   INR 1.2 1.1 1.1       LIPIDS/LFTS:  Recent Labs   Lab 11/02/22  0038 11/25/22  1051 12/07/22  0601 01/03/23  0931 02/07/23  0913 05/15/23  1005 04/06/24  0510 04/29/24  1107 07/29/24  1108   Cholesterol 152  --    --   --  152  --   --   --   --    Triglycerides 73  --  142  --  92  --   --   --   --    HDL 64  --   --   --  52  --   --   --   --    LDL Cholesterol 73.4  --   --   --  81.6  --   --   --   --    Non-HDL Cholesterol 88  --   --   --  100  --   --   --   --    AST 19   < >  --    < > 34   < > 12 20 11   ALT 16   < >  --    < > 59 H   < > 10 32 14    < > = values in this interval not displayed.       Hemoglobin A1C   Date Value Ref Range Status   07/29/2024 5.6 4.0 - 5.6 % Final     Comment:     ADA Screening Guidelines:  5.7-6.4%  Consistent with prediabetes  >or=6.5%  Consistent with diabetes    High levels of fetal hemoglobin interfere with the HbA1C  assay. Heterozygous hemoglobin variants (HbS, HgC, etc)do  not significantly interfere with this assay.   However, presence of multiple variants may affect accuracy.     04/29/2024 5.2 4.0 - 5.6 % Final     Comment:     ADA Screening Guidelines:  5.7-6.4%  Consistent with prediabetes  >or=6.5%  Consistent with diabetes    High levels of fetal hemoglobin interfere with the HbA1C  assay. Heterozygous hemoglobin variants (HbS, HgC, etc)do  not significantly interfere with this assay.   However, presence of multiple variants may affect accuracy.     12/14/2023 5.6 4.0 - 5.6 % Final     Comment:     ADA Screening Guidelines:  5.7-6.4%  Consistent with prediabetes  >or=6.5%  Consistent with diabetes    High levels of fetal hemoglobin interfere with the HbA1C  assay. Heterozygous hemoglobin variants (HbS, HgC, etc)do  not significantly interfere with this assay.   However, presence of multiple variants may affect accuracy.          The ASCVD Risk score (Dylon DK, et al., 2019) failed to calculate for the following reasons:    The 2019 ASCVD risk score is only valid for ages 40 to 79      Cardiovascular Testing:    No echocardiogram results found for the past 12 months     No cardiac monitor results found for the past 12 months     Echocardiogram 08/05/2022:    The left  ventricle is normal in size with concentric remodeling and normal systolic function. The estimated ejection fraction is 55%.  Normal right ventricular size with normal right ventricular systolic function.  Indeterminate left ventricular diastolic function.  Mild left atrial enlargement.  Normal central venous pressure (3 mmHg).    Echocardiogram 05/25/2022:    Moderate left atrial enlargement.  The left ventricle is normal in size with mild eccentric hypertrophy and normal systolic function.  The estimated ejection fraction is 65%.  Grade II left ventricular diastolic dysfunction.  The left ventricular global longitudinal strain is reduced measuring -14%, with preservation at the apex.  Normal right ventricular size with normal right ventricular systolic function.  Mild mitral regurgitation.  Mild pulmonic regurgitation.  Normal central venous pressure (3 mmHg).  The estimated PA systolic pressure is 27 mmHg.  Trivial posterior pericardial effusion.    PYP ATTR 04/04/2022:    Study is negative for TTR amyloidosis.  Planar imaging demonstrates cardiac activity that is absent to that of the adjacent rib cage.  SPECT imaging shows blood pool activity.    Cardiac MRI 11/16/2021:    Conclusion:    LV volumes are enlarged. LV mass is enlarged. T1, T2, and T2* maps are within normal range. LVEF = 42%.   RV volumes are normal.  RVEF = 50%.  Left atrial enlargement  Differential diagnosis for decreased LV function with increased LV mass includes infiltrative cardiomyopathy. However, as the patient has ESRD, Gd could not be injected. Without LGE imaging the specificity of the above findings is markedly reduced. Recommend clinical correlation.    Nuclear stress 06/07/2021:      Normal myocardial perfusion scan. There is no evidence of myocardial ischemia or infarction.    The gated perfusion images showed an ejection fraction of 65% at rest. The gated perfusion images showed an ejection fraction of 64% post stress. Normal  ejection fraction is greater than 51%.    There is normal wall motion at rest and post stress.    LV cavity size is normal at rest and normal at stress.    The EKG portion of this study is negative for ischemia.    The patient reported no chest pain during the stress test.    There were no arrhythmias during stress.    There are no prior studies for comparison.    Echocardiogram 05/20/2021:    The estimated ejection fraction is 65%.  The left ventricle is normal in size with concentric hypertrophy and normal systolic function.Speckled Myocardium and apical sparing apical pattern concerning for cardiac amyloid. Clinical Correlation is advised.  The left ventricular global longitudinal strain is -11%.  Normal left ventricular diastolic function.  Normal right ventricular size with normal right ventricular systolic function.  Mild left atrial enlargement.  Mild-to-moderate mitral regurgitation.  Mild to moderate tricuspid regurgitation.  Mild pulmonic regurgitation.  There is pulmonary hypertension.  The estimated PA systolic pressure is 44 mmHg.  Elevated central venous pressure (15 mmHg).    Echocardiogram 08/05/2020:    Normal left ventricular systolic function. The estimated ejection fraction is 70%.  No wall motion abnormalities.  Mild concentric left ventricular hypertrophy.  Grade I (mild) left ventricular diastolic dysfunction consistent with impaired relaxation.  Mild left atrial enlargement.  Normal right ventricular systolic function.  The estimated PA systolic pressure is 15 mmHg.  Normal central venous pressure (3 mmHg).    ASSESSMENT:     Chest pain  History heart failure with preserved ejection fraction  Pulmonary hypertension  Status post kidney/pancreas transplant 11/2022  Chronic immunosuppression  Blind right eye    PLAN:     Chest pain:  Exercise EKG.  Echocardiogram.  History of heart failure with preserved ejection fraction:  Echocardiogram.  Pulmonary hypertension: Echocardiogram.  Return to  clinic 1 month.           Misha Camacho MD, MPH, FAC, [facility]

## 2024-10-15 LAB
OHS QRS DURATION: 80 MS
OHS QTC CALCULATION: 429 MS

## 2024-10-18 ENCOUNTER — TELEPHONE (OUTPATIENT)
Dept: NEPHROLOGY | Facility: CLINIC | Age: 29
End: 2024-10-18
Payer: MEDICARE

## 2024-10-18 NOTE — TELEPHONE ENCOUNTER
----- Message from Eula sent at 10/18/2024 11:03 AM CDT -----  Regarding: Orders  Contact: Pt  [phone]          Current Appt date: 10/18/24     Type of Appt: Lab      Physician: Oswald Kruger MD     Reason for rescheduling: Could not make today appt  please schedule for 10/22/24     Caller: Isabela      Contact Preference:[phone]

## 2024-11-04 ENCOUNTER — LAB VISIT (OUTPATIENT)
Dept: LAB | Facility: HOSPITAL | Age: 29
End: 2024-11-04
Attending: INTERNAL MEDICINE
Payer: MEDICARE

## 2024-11-04 DIAGNOSIS — Z94.0 KIDNEY REPLACED BY TRANSPLANT: ICD-10-CM

## 2024-11-04 DIAGNOSIS — Z94.83 STATUS POST PANCREAS TRANSPLANTATION: ICD-10-CM

## 2024-11-04 DIAGNOSIS — E10.21 TYPE 1 DIABETES MELLITUS WITH DIABETIC NEPHROPATHY: ICD-10-CM

## 2024-11-04 DIAGNOSIS — Z94.0 KIDNEY TRANSPLANT STATUS: ICD-10-CM

## 2024-11-04 DIAGNOSIS — Z79.899 IMMUNOSUPPRESSION DUE TO DRUG THERAPY: ICD-10-CM

## 2024-11-04 DIAGNOSIS — D84.821 IMMUNOSUPPRESSION DUE TO DRUG THERAPY: ICD-10-CM

## 2024-11-04 LAB
25(OH)D3+25(OH)D2 SERPL-MCNC: 14 NG/ML (ref 30–96)
ALBUMIN SERPL BCP-MCNC: 4 G/DL (ref 3.5–5.2)
ALBUMIN SERPL BCP-MCNC: 4 G/DL (ref 3.5–5.2)
ALP SERPL-CCNC: 62 U/L (ref 40–150)
ALT SERPL W/O P-5'-P-CCNC: 12 U/L (ref 10–44)
AMYLASE SERPL-CCNC: 125 U/L (ref 20–110)
ANION GAP SERPL CALC-SCNC: 7 MMOL/L (ref 8–16)
AST SERPL-CCNC: 13 U/L (ref 10–40)
BASOPHILS # BLD AUTO: 0.02 K/UL (ref 0–0.2)
BASOPHILS NFR BLD: 0.4 % (ref 0–1.9)
BILIRUB DIRECT SERPL-MCNC: 0.2 MG/DL (ref 0.1–0.3)
BILIRUB SERPL-MCNC: 0.4 MG/DL (ref 0.1–1)
BUN SERPL-MCNC: 25 MG/DL (ref 6–20)
C PEPTIDE SERPL-MCNC: 4.57 NG/ML (ref 0.78–5.19)
CALCIUM SERPL-MCNC: 9.8 MG/DL (ref 8.7–10.5)
CHLORIDE SERPL-SCNC: 111 MMOL/L (ref 95–110)
CO2 SERPL-SCNC: 21 MMOL/L (ref 23–29)
CREAT SERPL-MCNC: 1 MG/DL (ref 0.5–1.4)
DIFFERENTIAL METHOD BLD: NORMAL
EOSINOPHIL # BLD AUTO: 0.1 K/UL (ref 0–0.5)
EOSINOPHIL NFR BLD: 2.4 % (ref 0–8)
ERYTHROCYTE [DISTWIDTH] IN BLOOD BY AUTOMATED COUNT: 14.3 % (ref 11.5–14.5)
EST. GFR  (NO RACE VARIABLE): >60 ML/MIN/1.73 M^2
ESTIMATED AVG GLUCOSE: 108 MG/DL (ref 68–131)
GLUCOSE SERPL-MCNC: 92 MG/DL (ref 70–110)
HBA1C MFR BLD: 5.4 % (ref 4–5.6)
HCT VFR BLD AUTO: 38.3 % (ref 37–48.5)
HGB BLD-MCNC: 12.3 G/DL (ref 12–16)
IMM GRANULOCYTES # BLD AUTO: 0.01 K/UL (ref 0–0.04)
IMM GRANULOCYTES NFR BLD AUTO: 0.2 % (ref 0–0.5)
LIPASE SERPL-CCNC: 27 U/L (ref 4–60)
LYMPHOCYTES # BLD AUTO: 1.6 K/UL (ref 1–4.8)
LYMPHOCYTES NFR BLD: 33.8 % (ref 18–48)
MCH RBC QN AUTO: 29 PG (ref 27–31)
MCHC RBC AUTO-ENTMCNC: 32.1 G/DL (ref 32–36)
MCV RBC AUTO: 90 FL (ref 82–98)
MONOCYTES # BLD AUTO: 0.6 K/UL (ref 0.3–1)
MONOCYTES NFR BLD: 13.1 % (ref 4–15)
NEUTROPHILS # BLD AUTO: 2.3 K/UL (ref 1.8–7.7)
NEUTROPHILS NFR BLD: 50.1 % (ref 38–73)
NRBC BLD-RTO: 0 /100 WBC
PHOSPHATE SERPL-MCNC: 3.4 MG/DL (ref 2.7–4.5)
PLATELET # BLD AUTO: 273 K/UL (ref 150–450)
PMV BLD AUTO: 10.3 FL (ref 9.2–12.9)
POTASSIUM SERPL-SCNC: 4.2 MMOL/L (ref 3.5–5.1)
PROT SERPL-MCNC: 7.4 G/DL (ref 6–8.4)
PTH-INTACT SERPL-MCNC: 119.5 PG/ML (ref 9–77)
RBC # BLD AUTO: 4.24 M/UL (ref 4–5.4)
SODIUM SERPL-SCNC: 139 MMOL/L (ref 136–145)
TACROLIMUS BLD-MCNC: 8.9 NG/ML (ref 5–15)
WBC # BLD AUTO: 4.59 K/UL (ref 3.9–12.7)

## 2024-11-04 PROCEDURE — 82247 BILIRUBIN TOTAL: CPT | Performed by: INTERNAL MEDICINE

## 2024-11-04 PROCEDURE — 84681 ASSAY OF C-PEPTIDE: CPT | Performed by: INTERNAL MEDICINE

## 2024-11-04 PROCEDURE — 83036 HEMOGLOBIN GLYCOSYLATED A1C: CPT | Performed by: INTERNAL MEDICINE

## 2024-11-04 PROCEDURE — 82150 ASSAY OF AMYLASE: CPT | Performed by: INTERNAL MEDICINE

## 2024-11-04 PROCEDURE — 80069 RENAL FUNCTION PANEL: CPT | Performed by: INTERNAL MEDICINE

## 2024-11-04 PROCEDURE — 85025 COMPLETE CBC W/AUTO DIFF WBC: CPT | Performed by: INTERNAL MEDICINE

## 2024-11-04 PROCEDURE — 80197 ASSAY OF TACROLIMUS: CPT | Performed by: INTERNAL MEDICINE

## 2024-11-04 PROCEDURE — 36415 COLL VENOUS BLD VENIPUNCTURE: CPT | Performed by: INTERNAL MEDICINE

## 2024-11-04 PROCEDURE — 83690 ASSAY OF LIPASE: CPT | Performed by: INTERNAL MEDICINE

## 2024-11-04 PROCEDURE — 83970 ASSAY OF PARATHORMONE: CPT | Performed by: INTERNAL MEDICINE

## 2024-11-04 PROCEDURE — 87799 DETECT AGENT NOS DNA QUANT: CPT | Performed by: INTERNAL MEDICINE

## 2024-11-04 PROCEDURE — 82306 VITAMIN D 25 HYDROXY: CPT | Performed by: INTERNAL MEDICINE

## 2024-11-05 ENCOUNTER — LAB VISIT (OUTPATIENT)
Dept: LAB | Facility: HOSPITAL | Age: 29
End: 2024-11-05
Attending: INTERNAL MEDICINE
Payer: MEDICARE

## 2024-11-05 DIAGNOSIS — Z94.0 KIDNEY REPLACED BY TRANSPLANT: ICD-10-CM

## 2024-11-05 DIAGNOSIS — Z94.83 STATUS POST PANCREAS TRANSPLANTATION: ICD-10-CM

## 2024-11-05 LAB
BILIRUB UR QL STRIP: NEGATIVE
CLARITY UR REFRACT.AUTO: CLEAR
COLOR UR AUTO: YELLOW
CREAT UR-MCNC: 119 MG/DL (ref 15–325)
GLUCOSE UR QL STRIP: NEGATIVE
HGB UR QL STRIP: NEGATIVE
KETONES UR QL STRIP: NEGATIVE
LEUKOCYTE ESTERASE UR QL STRIP: NEGATIVE
NITRITE UR QL STRIP: NEGATIVE
PH UR STRIP: 7 [PH] (ref 5–8)
PROT UR QL STRIP: NEGATIVE
PROT UR-MCNC: <7 MG/DL (ref 0–15)
PROT/CREAT UR: NORMAL MG/G{CREAT} (ref 0–0.2)
SP GR UR STRIP: 1.02 (ref 1–1.03)
URN SPEC COLLECT METH UR: NORMAL

## 2024-11-05 PROCEDURE — 84156 ASSAY OF PROTEIN URINE: CPT | Performed by: INTERNAL MEDICINE

## 2024-11-05 PROCEDURE — 81003 URINALYSIS AUTO W/O SCOPE: CPT | Performed by: INTERNAL MEDICINE

## 2024-11-10 NOTE — PROGRESS NOTES
Kidney/Pancreas Post-Transplant Assessment    Referring Physician: Oswald Kruger  Current Nephrologist: Tai Camilo    ORGAN: PANCREAS  Donor Type: donation after brain death  PHS Increased Risk: no  Cold Ischemia: 310 mins  Induction Medications:     Subjective:     CC:  Reassessment of renal allograft function and management of chronic immunosuppression.    HPI:  Ms. Read is a 29 y.o. 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 Thymo induction). Post transplant course complicated by N/V/GEORGE with urinary retention s/p dc placement (s/p removal), CMV infection as well as early kidney biopsy concerning for ABMR with negative DSA treated with IVIG.  She has CKD stage 2 - GFR 60-89 and her baseline creatinine is between 0.9-1.0. She takes mycophenolate mofetil, prednisone, and tacrolimus for maintenance immunosuppression.      Recent hospitalizations or ER visits since her previous clinic visit:    7/16/24-hospital/ED for eye surgery      Interval HX:  LOV 5/6/24   Has re-established with general nephrology -Dr. Kruger.     Intake-reports adequate water intake  UOP: no problems reported   Peripheral edema: denies    tolerating IS meds well    HX gastroparesis -/intermittent nausea--takes phenergan PRN   BP  BP Readings from Last 3 Encounters:   11/11/24 118/69   10/14/24 102/70   09/03/24 108/72       Lab /diagnostic results reviewed with patient today.   All questions answered      Past Medical History:   Diagnosis Date    Acute cystitis without hematuria 11/25/2022    Acute kidney injury superimposed on chronic kidney disease 04/07/2021    Anemia     Anemia in ESRD (end-stage renal disease) 07/29/2020    Lab Results  Component Value Date   WBC 7.23 07/29/2020   HGB 7.1 (L) 07/29/2020   HCT 22.2 (L) 07/29/2020   MCV 91 07/29/2020    (H) 07/29/2020   Following with hematology and  scheduled to undergo iron infusions    Bacteremia due to Escherichia coli 01/23/2023    Bilious vomiting with nausea 12/12/2022    Chronic hypertension with exacerbation during pregnancy in second trimester 11/06/2020    Current regimen (11/6/20):  - Carvedilol 12.5 mg BID - Nifedipine 30 mg daily Baseline CKD + proteinuria    Chronic kidney disease     Depression     Diabetes mellitus     Diabetic retinopathy     Diarrhea     Encounter for blood transfusion     End stage renal failure on dialysis     Fever 12/01/2022    Fever of undetermined origin 12/12/2022    Gastroparesis     Glaucoma     Hepatomegaly 04/29/2021    History of diabetes mellitus, type I 12/20/2022    Hx of psychiatric care     Hyperlipidemia     Hypertension     Hypertension, essential     Labile      Major depressive disorder, single episode, unspecified 05/28/2021    Nausea and vomiting 12/12/2022    Nephrotic syndrome     Nephrotic syndrome 03/04/2021    Nonspecific reaction to tuberculin skin test without active tuberculosis 10/01/2021    Orthostatic hypotension 01/25/2023    Palpitations     Poor fetal growth affecting management of mother in second trimester 10/15/2020    Pyelonephritis, acute 11/26/2022    Restrictive lung disease     Retinal detachment     Sepsis 12/01/2022    Sepsis 11/25/2022    Sepsis due to Escherichia coli 01/23/2023    Severe pre-eclampsia in second trimester 11/06/2020    Severe sepsis 11/25/2022    SIRS (systemic inflammatory response syndrome) 12/06/2022    Status post pancreas transplantation 11/26/2022 11/2/2022    Steroid-induced hyperglycemia 12/05/2022    Type 1 diabetes mellitus with end-stage renal disease (ESRD) 02/24/2015    Followed by Dr. Mayo.  Last A1C was 13  Currently on lantus 20 units qAM and humolog 10 units with meals  - a fetal echocardiogram around 22-24 weeks - needs eye exam, podiatry exam  - needs EKG, maternal echo and fu with cardiology  - ASA 81mg, folic acid 4mg PO daily -  "hemoglobin A1c every 4-6 weeks -24 hour urine for protein and creatinine clearance should be performed. Patient will also need a       Review of Systems   Constitutional:  Negative for activity change, appetite change and fever.   HENT:  Negative for congestion, mouth sores and sore throat.    Eyes:  Positive for visual disturbance.   Respiratory:  Negative for cough, chest tightness and shortness of breath.    Cardiovascular:  Negative for chest pain, palpitations and leg swelling.   Gastrointestinal:  Positive for nausea (chronic-intermittent). Negative for abdominal distention, constipation and diarrhea.        HX gastroparesis    Genitourinary:  Negative for difficulty urinating, frequency and hematuria.   Musculoskeletal:  Negative for arthralgias, gait problem and myalgias.   Skin:  Negative for wound.   Allergic/Immunologic: Positive for immunocompromised state. Negative for environmental allergies and food allergies.   Neurological:  Negative for dizziness, weakness and numbness.   Psychiatric/Behavioral:  Negative for sleep disturbance. The patient is not nervous/anxious.        Objective:     Blood pressure 118/69, pulse 105, temperature 97.7 °F (36.5 °C), temperature source Temporal, resp. rate 14, height 5' 4" (1.626 m), weight 78.9 kg (173 lb 15.1 oz), SpO2 100%.body mass index is 29.86 kg/m².    Physical Exam  Vitals and nursing note reviewed.   Constitutional:       Appearance: Normal appearance.   HENT:      Head: Normocephalic.   Cardiovascular:      Rate and Rhythm: Normal rate and regular rhythm.      Heart sounds: Normal heart sounds.   Pulmonary:      Effort: Pulmonary effort is normal.      Breath sounds: Normal breath sounds.   Abdominal:      General: Bowel sounds are normal. There is no distension.      Palpations: Abdomen is soft.      Tenderness: There is no abdominal tenderness.   Musculoskeletal:         General: Normal range of motion.   Skin:     General: Skin is warm and dry. "   Neurological:      General: No focal deficit present.      Mental Status: She is alert.   Psychiatric:         Behavior: Behavior normal.         Labs:  Lab Results   Component Value Date    WBC 4.59 2024    HGB 12.3 2024    HCT 38.3 2024     2024    K 4.2 2024     (H) 2024    CO2 21 (L) 2024    BUN 25 (H) 2024    CREATININE 1.0 2024    EGFRNORACEVR >60.0 2024    CALCIUM 9.8 2024    PHOS 3.4 2024    MG 1.5 (L) 2024    ALBUMIN 4.0 2024    ALBUMIN 4.0 2024    AST 13 2024    ALT 12 2024    UTPCR Unable to calculate 2024    .5 (H) 2024    TACROLIMUS 8.9 2024     Labs were reviewed with the patient.    Assessment:     1. Status post -donor simultaneous pancreas kidney transplantation    2. Immunodeficiency due to long term immunosuppressive drug therapy    3. Anemia due to chronic kidney disease, unspecified CKD stage    4. Renovascular hypertension    5. Secondary hyperparathyroidism    6. At risk for opportunistic infections    7. History of diabetes mellitus, type I          Plan:    Cont to f/u with general nephrology -Dr. Kruger.      Prednisone refilled     1. Immunosuppression: Prograf trough 8.9. Continue Prograf 4/4,  mg BID, and Prednisone 5 mg QD. Will continue to monitor for drug toxicities    2. Allograft Function: Stable. Slight chronic elevation in amylase but otherwise normal.  - history of DM1 s/p received a donation after brain death kidney and pancreas transplant on 11/3/22 (0% PRA. Thymo induction).   - Post transplant course complicated by N/V/GEORGE with urinary retention s/p dc placement (s/p removal), recent CMV infection as well as early kidney biopsy concerning for ABMR with negative DSA treated with IVIG.    - baseline creatinine is between 0.9-1.0.   Lab Results   Component Value Date    CREATININE 1.0 2024        Latest Reference  Range & Units 2yr 0mo,  Kidney,Pancreas-Post 3 Year  11/04/24 08:15   eGFR >60 mL/min/1.73 m^2 >60.0       Lab Results   Component Value Date    LIPASE 27 11/04/2024     Lab Results   Component Value Date    AMYLASE 125 (H) 11/04/2024     Lab Results   Component Value Date    HGBA1C 5.4 11/04/2024       3. Hypertension management: advise low salt diet and home BP monitoring    No meds, -->MGMT deferred to general nephrology      4. Metabolic Bone Disease/Secondary Hyperparathyroidism:Will monitor /guidelines  -->MGMT deferred to general nephrology, No need for intervention    Lab Results   Component Value Date    .5 (H) 11/04/2024    CALCIUM 9.8 11/04/2024    CAION 1.02 (L) 11/03/2022    PHOS 3.4 11/04/2024       5. Electrolytes:  Will monitor /guidelines  Cont Sodium bicarb , -->MGMT deferred to general nephrology, No need for intervention    Lab Results   Component Value Date     11/04/2024    K 4.2 11/04/2024     (H) 11/04/2024    CO2 21 (L) 11/04/2024       6. Anemia: stable.Will monitor /guidelines,  No need for intervention ,  -->MGMT deferred to general nephrology    Lab Results   Component Value Date    WBC 4.59 11/04/2024    HGB 12.3 11/04/2024    HCT 38.3 11/04/2024    MCV 90 11/04/2024     11/04/2024         7. Cytopenias: no significant cytopenias. Medicine list reviewed including potential causes of drug-induced cytopenias    8.  Proteinuria: continue p/c ratio as per guidelines       Latest Reference Range & Units 2yr 0mo,  Kidney,Pancreas-Post 3 Year  11/05/24 09:56   Urine Protein/Creatinine Ratio 0.00 - 0.20  Unable to calculate         9. BK virus infection screening:  will continue to monitor per guidelines   Latest Reference Range & Units 2yr 0mo,  Kidney,Pancreas-Post 3 Year  11/04/24 08:15   BK Virus DNA, Blood Not detected  Not detected      Latest Reference Range & Units 2yr 0mo,  Kidney,Pancreas-Post 3 Year  11/04/24 08:15   BK Virus DNA PCR, Quant, Blood <125  Copies/mL <125       10. Weight education: provided during the clinic visit   Body mass index is 29.86 kg/m².     11.Patient safety education regarding immunosuppression including prophylaxis posttransplant for CMV, PCP : Education provided about vaccination and prevention of infections            Follow-up:   Clinic: return to transplant clinic weekly for the first month after transplant; every 2 weeks during months 2-3; then at 6-, 9-, 12-, 18-, 24-, and 36- months post-transplant to reassess for complications from immunosuppression toxicity and monitor for rejection.  Annually thereafter.    Labs: since patient remains at high risk for rejection and drug-related complications that warrant close monitoring, labs will be ordered as follows: continue twice weekly CBC, renal panel, and drug level for first month; then same labs once weekly through 3rd month post-transplant.  Urine for UA and protein/creatinine ratio monthly.  Serum BK - PCR at 1-, 3-, 6-, 9-, 12-, 18-, 24-, 36- 48-, and 60 months post-transplant.  Hepatic panel at 1-, 2-, 3-, 6-, 9-, 12-, 18-, 24-, and 36- months post-transplant.    Soraida Hollis NP

## 2024-11-11 ENCOUNTER — OFFICE VISIT (OUTPATIENT)
Dept: TRANSPLANT | Facility: CLINIC | Age: 29
End: 2024-11-11
Payer: MEDICARE

## 2024-11-11 VITALS
BODY MASS INDEX: 29.7 KG/M2 | DIASTOLIC BLOOD PRESSURE: 69 MMHG | TEMPERATURE: 98 F | SYSTOLIC BLOOD PRESSURE: 118 MMHG | HEIGHT: 64 IN | OXYGEN SATURATION: 100 % | HEART RATE: 105 BPM | WEIGHT: 173.94 LBS | RESPIRATION RATE: 14 BRPM

## 2024-11-11 DIAGNOSIS — D84.821 IMMUNODEFICIENCY DUE TO LONG TERM IMMUNOSUPPRESSIVE DRUG THERAPY: ICD-10-CM

## 2024-11-11 DIAGNOSIS — D63.1 ANEMIA DUE TO CHRONIC KIDNEY DISEASE, UNSPECIFIED CKD STAGE: ICD-10-CM

## 2024-11-11 DIAGNOSIS — N18.9 ANEMIA DUE TO CHRONIC KIDNEY DISEASE, UNSPECIFIED CKD STAGE: ICD-10-CM

## 2024-11-11 DIAGNOSIS — T45.1X5A IMMUNODEFICIENCY DUE TO LONG TERM IMMUNOSUPPRESSIVE DRUG THERAPY: ICD-10-CM

## 2024-11-11 DIAGNOSIS — Z86.39 HISTORY OF DIABETES MELLITUS, TYPE I: Chronic | ICD-10-CM

## 2024-11-11 DIAGNOSIS — Z94.83 STATUS POST SIMULTANEOUS KIDNEY AND PANCREAS TRANSPLANT: ICD-10-CM

## 2024-11-11 DIAGNOSIS — Z79.899 IMMUNODEFICIENCY DUE TO LONG TERM IMMUNOSUPPRESSIVE DRUG THERAPY: ICD-10-CM

## 2024-11-11 DIAGNOSIS — N25.81 SECONDARY HYPERPARATHYROIDISM: ICD-10-CM

## 2024-11-11 DIAGNOSIS — Z94.0 STATUS POST SIMULTANEOUS KIDNEY AND PANCREAS TRANSPLANT: ICD-10-CM

## 2024-11-11 DIAGNOSIS — Z91.89 AT RISK FOR OPPORTUNISTIC INFECTIONS: ICD-10-CM

## 2024-11-11 DIAGNOSIS — Z94.0 STATUS POST DECEASED-DONOR KIDNEY TRANSPLANTATION: Primary | Chronic | ICD-10-CM

## 2024-11-11 DIAGNOSIS — I15.0 RENOVASCULAR HYPERTENSION: ICD-10-CM

## 2024-11-11 PROCEDURE — 3074F SYST BP LT 130 MM HG: CPT | Mod: CPTII,S$GLB,, | Performed by: NURSE PRACTITIONER

## 2024-11-11 PROCEDURE — 3008F BODY MASS INDEX DOCD: CPT | Mod: CPTII,S$GLB,, | Performed by: NURSE PRACTITIONER

## 2024-11-11 PROCEDURE — 99999 PR PBB SHADOW E&M-EST. PATIENT-LVL IV: CPT | Mod: PBBFAC,,, | Performed by: NURSE PRACTITIONER

## 2024-11-11 PROCEDURE — 3078F DIAST BP <80 MM HG: CPT | Mod: CPTII,S$GLB,, | Performed by: NURSE PRACTITIONER

## 2024-11-11 PROCEDURE — 3044F HG A1C LEVEL LT 7.0%: CPT | Mod: CPTII,S$GLB,, | Performed by: NURSE PRACTITIONER

## 2024-11-11 PROCEDURE — 1159F MED LIST DOCD IN RCRD: CPT | Mod: CPTII,S$GLB,, | Performed by: NURSE PRACTITIONER

## 2024-11-11 PROCEDURE — 99215 OFFICE O/P EST HI 40 MIN: CPT | Mod: S$GLB,,, | Performed by: NURSE PRACTITIONER

## 2024-11-11 PROCEDURE — 3066F NEPHROPATHY DOC TX: CPT | Mod: CPTII,S$GLB,, | Performed by: NURSE PRACTITIONER

## 2024-11-11 PROCEDURE — 1160F RVW MEDS BY RX/DR IN RCRD: CPT | Mod: CPTII,S$GLB,, | Performed by: NURSE PRACTITIONER

## 2024-11-11 RX ORDER — PREDNISONE 5 MG/1
5 TABLET ORAL DAILY
Qty: 30 TABLET | Refills: 11 | Status: SHIPPED | OUTPATIENT
Start: 2024-11-11

## 2024-11-11 NOTE — LETTER
November 11, 2024        Tai Camilo  [address]  [facility] [address]  Phone: [phone]  Fax: [phone]             [facility] 1st Fl  [address]  [facility] [address]  Phone: [phone]   Patient: Isabeal Read   MR Number: [id number]   YOB: 1995   Date of Visit: 11/11/2024       Dear Dr. Tai Camilo    Thank you for referring Isabela Read to me for evaluation. Attached you will find relevant portions of my assessment and plan of care.    If you have questions, please do not hesitate to call me. I look forward to following Isabela Read along with you.    Sincerely,    Soraida Hollis, NP    Enclosure    If you would like to receive this communication electronically, please contact [email or web address] or [phone] to request [employer] access.    [employer] is a tool which provides read-only access to select patient information with whom you have a relationship. Its easy to use and provides real time access to review your patients record including encounter summaries, notes, results, and demographic information.    If you feel you have received this communication in error or would no longer like to receive these types of communications, please e-mail [email or web address]

## 2024-11-18 ENCOUNTER — HOSPITAL ENCOUNTER (OUTPATIENT)
Dept: CARDIOLOGY | Facility: HOSPITAL | Age: 29
Discharge: HOME | End: 2024-11-18
Attending: INTERNAL MEDICINE
Payer: MEDICARE

## 2024-11-18 VITALS
WEIGHT: 174 LBS | HEART RATE: 82 BPM | BODY MASS INDEX: 29.71 KG/M2 | SYSTOLIC BLOOD PRESSURE: 115 MMHG | HEIGHT: 64 IN | DIASTOLIC BLOOD PRESSURE: 70 MMHG

## 2024-11-18 VITALS — BODY MASS INDEX: 29.53 KG/M2 | WEIGHT: 173 LBS | HEIGHT: 64 IN

## 2024-11-18 DIAGNOSIS — R07.89 OTHER CHEST PAIN: ICD-10-CM

## 2024-11-18 DIAGNOSIS — I50.30 HEART FAILURE WITH PRESERVED EJECTION FRACTION, UNSPECIFIED HF CHRONICITY: Chronic | ICD-10-CM

## 2024-11-18 DIAGNOSIS — I15.0 RENOVASCULAR HYPERTENSION: ICD-10-CM

## 2024-11-18 LAB
ASCENDING AORTA: 2.51 CM
AV AREA BY CONTINUOUS VTI: 2.2 CM2
AV INDEX (PROSTH): 0.85
AV LVOT MEAN GRADIENT: 4 MMHG
AV LVOT PEAK GRADIENT: 7 MMHG
AV MEAN GRADIENT: 5.1 MMHG
AV PEAK GRADIENT: 9 MMHG
AV VALVE AREA BY VELOCITY RATIO: 2.2 CM²
AV VALVE AREA: 2.2 CM2
AV VELOCITY RATIO: 0.87
BSA FOR ECHO PROCEDURE: 1.89 M2
CV ECHO LV RWT: 0.41 CM
CV STRESS BASE HR: 98 BPM
DIASTOLIC BLOOD PRESSURE: 92 MMHG
DOP CALC AO PEAK VEL: 1.5 M/S
DOP CALC AO VTI: 28.1 CM
DOP CALC LVOT AREA: 2.5 CM2
DOP CALC LVOT DIAMETER: 1.8 CM
DOP CALC LVOT PEAK VEL: 1.3 M/S
DOP CALC LVOT STROKE VOLUME: 60.8 CM3
DOP CALCLVOT PEAK VEL VTI: 23.9 CM
E WAVE DECELERATION TIME: 169.52 MS
E/A RATIO: 0.84
E/E' RATIO: 6.53 M/S
ECHO EF ESTIMATED: 56 %
ECHO LV POSTERIOR WALL: 0.8 CM (ref 0.6–1.1)
FRACTIONAL SHORTENING: 28.2 % (ref 28–44)
INTERVENTRICULAR SEPTUM: 0.9 CM (ref 0.6–1.1)
IVRT: 94.2 MS
LA MAJOR: 4.59 CM
LA MINOR: 5.05 CM
LA WIDTH: 3.68 CM
LEFT ATRIUM SIZE: 3.34 CM
LEFT ATRIUM VOLUME INDEX MOD: 29.4 ML/M2
LEFT ATRIUM VOLUME INDEX: 27.3 ML/M2
LEFT ATRIUM VOLUME MOD: 54.03 ML
LEFT ATRIUM VOLUME: 50.24 CM3
LEFT INTERNAL DIMENSION IN SYSTOLE: 2.8 CM (ref 2.1–4)
LEFT VENTRICLE DIASTOLIC VOLUME INDEX: 35.26 ML/M2
LEFT VENTRICLE DIASTOLIC VOLUME: 64.88 ML
LEFT VENTRICLE MASS INDEX: 52.9 G/M2
LEFT VENTRICLE SYSTOLIC VOLUME INDEX: 15.5 ML/M2
LEFT VENTRICLE SYSTOLIC VOLUME: 28.51 ML
LEFT VENTRICULAR INTERNAL DIMENSION IN DIASTOLE: 3.9 CM (ref 3.5–6)
LEFT VENTRICULAR MASS: 97.4 G
LV LATERAL E/E' RATIO: 5.17
LV SEPTAL E/E' RATIO: 8.86
MV A" WAVE DURATION": 79.92 MS
MV PEAK A VEL: 0.74 M/S
MV PEAK E VEL: 0.62 M/S
OHS CV CPX 1 MINUTE RECOVERY HEART RATE: 118 BPM
OHS CV CPX 85 PERCENT MAX PREDICTED HEART RATE MALE: 162
OHS CV CPX ESTIMATED METS: 9
OHS CV CPX MAX PREDICTED HEART RATE: 191
OHS CV CPX PATIENT IS FEMALE: 1
OHS CV CPX PATIENT IS MALE: 0
OHS CV CPX PEAK DIASTOLIC BLOOD PRESSURE: 70 MMHG
OHS CV CPX PEAK HEAR RATE: 127 BPM
OHS CV CPX PEAK RATE PRESSURE PRODUCT: NORMAL
OHS CV CPX PEAK SYSTOLIC BLOOD PRESSURE: 158 MMHG
OHS CV CPX PERCENT MAX PREDICTED HEART RATE ACHIEVED: 70
OHS CV CPX RATE PRESSURE PRODUCT PRESENTING: NORMAL
OHS CV RV/LV RATIO: 0.87 CM
PISA TR MAX VEL: 1.71 M/S
PULM VEIN A" WAVE DURATION": 79.92 MS
PULM VEIN S/D RATIO: 1.72
PULMONIC VEIN PEAK A VELOCITY: 0.2 M/S
PV PEAK D VEL: 0.43 M/S
PV PEAK S VEL: 0.74 M/S
RA MAJOR: 4.76 CM
RA WIDTH: 3.11 CM
RIGHT VENTRICLE DIASTOLIC BASEL DIMENSION: 3.4 CM
RV TISSUE DOPPLER FREE WALL SYSTOLIC VELOCITY 1 (APICAL 4 CHAMBER VIEW): 13.81 CM/S
SINUS: 2.36 CM
STJ: 2.01 CM
STRESS ECHO POST EXERCISE DUR MIN: 5 MINUTES
STRESS ECHO POST EXERCISE DUR SEC: 39 SECONDS
SYSTOLIC BLOOD PRESSURE: 138 MMHG
TDI LATERAL: 0.12 M/S
TDI SEPTAL: 0.07 M/S
TDI: 0.1 M/S
TR MAX PG: 12 MMHG
TRICUSPID ANNULAR PLANE SYSTOLIC EXCURSION: 1.93 CM
TV PEAK GRADIENT: 12 MMHG
Z-SCORE OF LEFT VENTRICULAR DIMENSION IN END DIASTOLE: -2.69
Z-SCORE OF LEFT VENTRICULAR DIMENSION IN END SYSTOLE: -0.93

## 2024-11-18 PROCEDURE — 93306 TTE W/DOPPLER COMPLETE: CPT

## 2024-11-18 PROCEDURE — 93018 CV STRESS TEST I&R ONLY: CPT | Mod: ,,, | Performed by: INTERNAL MEDICINE

## 2024-11-18 PROCEDURE — 93306 TTE W/DOPPLER COMPLETE: CPT | Mod: 26,,, | Performed by: INTERNAL MEDICINE

## 2024-11-18 PROCEDURE — 93016 CV STRESS TEST SUPVJ ONLY: CPT | Mod: ,,, | Performed by: INTERNAL MEDICINE

## 2024-11-18 PROCEDURE — 93017 CV STRESS TEST TRACING ONLY: CPT

## 2024-11-20 ENCOUNTER — PATIENT MESSAGE (OUTPATIENT)
Dept: CARDIOLOGY | Facility: CLINIC | Age: 29
End: 2024-11-20
Payer: MEDICARE

## 2024-11-29 DIAGNOSIS — Z30.9 ENCOUNTER FOR CONTRACEPTIVE MANAGEMENT, UNSPECIFIED TYPE: ICD-10-CM

## 2024-11-30 NOTE — TELEPHONE ENCOUNTER
Refill Routing Note   Medication(s) are not appropriate for processing by [facility] for the following reason(s):        Outside of protocol  Patient not seen by provider within 15 months    [facility] action(s):  Route               Appointments  past 12m or future 3m with PCP    Date Provider   Last Visit   2/8/2023 Cheyenne Thompson MD   Next Visit   Visit date not found Cheyenne Thompson MD   ED visits in past 90 days: 0        Note composed:7:03 AM 11/30/2024

## 2024-12-02 RX ORDER — MEDROXYPROGESTERONE ACETATE 150 MG/ML
150 INJECTION, SUSPENSION INTRAMUSCULAR
Qty: 1 ML | Refills: 3 | Status: SHIPPED | OUTPATIENT
Start: 2024-12-02

## 2024-12-02 RX ORDER — MEDROXYPROGESTERONE ACETATE 150 MG/ML
150 INJECTION, SUSPENSION INTRAMUSCULAR
Qty: 1 ML | Refills: 0 | Status: SHIPPED | OUTPATIENT
Start: 2024-12-02 | End: 2024-12-02 | Stop reason: SDUPTHER

## 2024-12-04 ENCOUNTER — PATIENT MESSAGE (OUTPATIENT)
Dept: OBSTETRICS AND GYNECOLOGY | Facility: CLINIC | Age: 29
End: 2024-12-04
Payer: MEDICARE

## 2025-01-17 ENCOUNTER — CLINICAL SUPPORT (OUTPATIENT)
Dept: OPHTHALMOLOGY | Facility: CLINIC | Age: 30
End: 2025-01-17
Payer: MEDICARE

## 2025-01-17 ENCOUNTER — OFFICE VISIT (OUTPATIENT)
Dept: OPHTHALMOLOGY | Facility: CLINIC | Age: 30
End: 2025-01-17
Payer: MEDICARE

## 2025-01-17 DIAGNOSIS — H27.01 APHAKIA, RIGHT: ICD-10-CM

## 2025-01-17 DIAGNOSIS — H35.21 PROLIFERATIVE VITREORETINOPATHY OF RIGHT EYE: ICD-10-CM

## 2025-01-17 DIAGNOSIS — E10.3592 TYPE 1 DIABETES MELLITUS WITH PROLIFERATIVE RETINOPATHY OF LEFT EYE WITHOUT MACULAR EDEMA: ICD-10-CM

## 2025-01-17 DIAGNOSIS — E10.3541: Primary | ICD-10-CM

## 2025-01-17 PROCEDURE — 92201 OPSCPY EXTND RTA DRAW UNI/BI: CPT | Mod: S$GLB,,, | Performed by: OPHTHALMOLOGY

## 2025-01-17 PROCEDURE — 99999 PR PBB SHADOW E&M-EST. PATIENT-LVL II: CPT | Mod: PBBFAC,,, | Performed by: OPHTHALMOLOGY

## 2025-01-17 PROCEDURE — 1160F RVW MEDS BY RX/DR IN RCRD: CPT | Mod: CPTII,S$GLB,, | Performed by: OPHTHALMOLOGY

## 2025-01-17 PROCEDURE — 2022F DILAT RTA XM EVC RTNOPTHY: CPT | Mod: CPTII,S$GLB,, | Performed by: OPHTHALMOLOGY

## 2025-01-17 PROCEDURE — 1159F MED LIST DOCD IN RCRD: CPT | Mod: CPTII,S$GLB,, | Performed by: OPHTHALMOLOGY

## 2025-01-17 PROCEDURE — 92134 CPTRZ OPH DX IMG PST SGM RTA: CPT | Mod: S$GLB,,, | Performed by: OPHTHALMOLOGY

## 2025-01-17 PROCEDURE — 92014 COMPRE OPH EXAM EST PT 1/>: CPT | Mod: S$GLB,,, | Performed by: OPHTHALMOLOGY

## 2025-01-17 NOTE — PROGRESS NOTES
Subjective:       Patient ID: Isabela Read is a 29 y.o. female      Chief Complaint   Patient presents with    Diabetic Eye Exam     History of Present Illness  HPI    Va improved OD.  Stable and good OS    No f/fpain OU    No gtts  Last edited by Maximilian Montaño MD on 1/17/2025  6:11 PM.        Imaging:    See report    Assessment/Plan:     1. Type 1 diabetes mellitus with right eye affected by proliferative retinopathy and combined traction and rhegmatogenous retinal detachment (Primary)  Multiple surgeries, attached, doing well    - Posterior Segment OCT Retina-Both eyes    2. Proliferative vitreoretinopathy of right eye  See #1    3. Type 1 diabetes mellitus with proliferative retinopathy of left eye without macular edema  S/p PRP  No traction.    Pt relatively intolerant of laser in clinic  Stable    Diabetic Retinopathy discussed in detail, all questions answered  Stressed importance of good BS/BP/Chol Control  RTC immediately PRN any vision changes, laure blurry vision, missing vision, floaters, distortions, etc        2 loose K sutures OD removed at slit lamp  5% betadine  Sterile 25 ga needle, sterile jeweler's  No complications  Vig 4/0 x 3 days      Follow up in about 4 months (around 5/17/2025), or if symptoms worsen or fail to improve, for Comprehensive Examination (DILATE OU), OCT Mac.

## 2025-02-03 ENCOUNTER — LAB VISIT (OUTPATIENT)
Dept: LAB | Facility: HOSPITAL | Age: 30
End: 2025-02-03
Attending: INTERNAL MEDICINE
Payer: MEDICARE

## 2025-02-03 DIAGNOSIS — Z94.0 KIDNEY REPLACED BY TRANSPLANT: ICD-10-CM

## 2025-02-03 DIAGNOSIS — Z94.83 STATUS POST PANCREAS TRANSPLANTATION: ICD-10-CM

## 2025-02-03 LAB
ALBUMIN SERPL BCP-MCNC: 4 G/DL (ref 3.5–5.2)
AMYLASE SERPL-CCNC: 105 U/L (ref 20–110)
ANION GAP SERPL CALC-SCNC: 8 MMOL/L (ref 8–16)
BASOPHILS # BLD AUTO: 0.02 K/UL (ref 0–0.2)
BASOPHILS NFR BLD: 0.4 % (ref 0–1.9)
BUN SERPL-MCNC: 26 MG/DL (ref 6–20)
CALCIUM SERPL-MCNC: 9.8 MG/DL (ref 8.7–10.5)
CHLORIDE SERPL-SCNC: 111 MMOL/L (ref 95–110)
CO2 SERPL-SCNC: 22 MMOL/L (ref 23–29)
CREAT SERPL-MCNC: 1.1 MG/DL (ref 0.5–1.4)
DIFFERENTIAL METHOD BLD: ABNORMAL
EOSINOPHIL # BLD AUTO: 0.2 K/UL (ref 0–0.5)
EOSINOPHIL NFR BLD: 4 % (ref 0–8)
ERYTHROCYTE [DISTWIDTH] IN BLOOD BY AUTOMATED COUNT: 13.6 % (ref 11.5–14.5)
EST. GFR  (NO RACE VARIABLE): >60 ML/MIN/1.73 M^2
GLUCOSE SERPL-MCNC: 81 MG/DL (ref 70–110)
HCT VFR BLD AUTO: 39 % (ref 37–48.5)
HGB BLD-MCNC: 12.3 G/DL (ref 12–16)
IMM GRANULOCYTES # BLD AUTO: 0.01 K/UL (ref 0–0.04)
IMM GRANULOCYTES NFR BLD AUTO: 0.2 % (ref 0–0.5)
LIPASE SERPL-CCNC: 21 U/L (ref 4–60)
LYMPHOCYTES # BLD AUTO: 1.6 K/UL (ref 1–4.8)
LYMPHOCYTES NFR BLD: 29 % (ref 18–48)
MCH RBC QN AUTO: 28.7 PG (ref 27–31)
MCHC RBC AUTO-ENTMCNC: 31.5 G/DL (ref 32–36)
MCV RBC AUTO: 91 FL (ref 82–98)
MONOCYTES # BLD AUTO: 0.6 K/UL (ref 0.3–1)
MONOCYTES NFR BLD: 11.4 % (ref 4–15)
NEUTROPHILS # BLD AUTO: 3 K/UL (ref 1.8–7.7)
NEUTROPHILS NFR BLD: 55 % (ref 38–73)
NRBC BLD-RTO: 0 /100 WBC
PHOSPHATE SERPL-MCNC: 3.2 MG/DL (ref 2.7–4.5)
PLATELET # BLD AUTO: 218 K/UL (ref 150–450)
PMV BLD AUTO: 10.8 FL (ref 9.2–12.9)
POTASSIUM SERPL-SCNC: 4.5 MMOL/L (ref 3.5–5.1)
RBC # BLD AUTO: 4.29 M/UL (ref 4–5.4)
SODIUM SERPL-SCNC: 141 MMOL/L (ref 136–145)
TACROLIMUS BLD-MCNC: 11 NG/ML (ref 5–15)
WBC # BLD AUTO: 5.44 K/UL (ref 3.9–12.7)

## 2025-02-03 PROCEDURE — 82150 ASSAY OF AMYLASE: CPT | Performed by: INTERNAL MEDICINE

## 2025-02-03 PROCEDURE — 36415 COLL VENOUS BLD VENIPUNCTURE: CPT | Performed by: INTERNAL MEDICINE

## 2025-02-03 PROCEDURE — 85025 COMPLETE CBC W/AUTO DIFF WBC: CPT | Performed by: INTERNAL MEDICINE

## 2025-02-03 PROCEDURE — 83690 ASSAY OF LIPASE: CPT | Performed by: INTERNAL MEDICINE

## 2025-02-03 PROCEDURE — 80069 RENAL FUNCTION PANEL: CPT | Performed by: INTERNAL MEDICINE

## 2025-02-03 PROCEDURE — 80197 ASSAY OF TACROLIMUS: CPT | Performed by: INTERNAL MEDICINE

## 2025-02-13 ENCOUNTER — PATIENT MESSAGE (OUTPATIENT)
Dept: TRANSPLANT | Facility: CLINIC | Age: 30
End: 2025-02-13
Payer: MEDICARE

## 2025-03-20 NOTE — PROGRESS NOTES
Subjective:      Patient ID: Isabela Read is a 30 y.o. female.    Chief Complaint:  Follow-up (DXA) and Results (DXA)    History of Present Illness  Isabela Read is here for evaluation of bones.  Previously seen by Dr. Mayo for T1DM.  Last seen 01/2023.  This is their first visit with me.      History of T1DM s/p kidney pancreas transplant 11/2022, HTN, HLD, HF, CKD stage 2, vitamin D deficiency, secondary hyperparathyroidism.    She is on Prednisone 5 mg daily and follows the transplant team.    With regards to type 1 diabetes:  Family history: brother with T1DM in remission for 27 years following  transplant    Current regimen:  N/A    Other medications tried:  Insulin-  transplant 2022    Glucose Monitor: not checking    Hypoglycemia:  Denies     Diabetes Management Status    Hemoglobin A1C   Date Value Ref Range Status   11/04/2024 5.4 4.0 - 5.6 % Final     Comment:     ADA Screening Guidelines:  5.7-6.4%  Consistent with prediabetes  >or=6.5%  Consistent with diabetes    High levels of fetal hemoglobin interfere with the HbA1C  assay. Heterozygous hemoglobin variants (HbS, HgC, etc)do  not significantly interfere with this assay.   However, presence of multiple variants may affect accuracy.     07/29/2024 5.6 4.0 - 5.6 % Final     Comment:     ADA Screening Guidelines:  5.7-6.4%  Consistent with prediabetes  >or=6.5%  Consistent with diabetes    High levels of fetal hemoglobin interfere with the HbA1C  assay. Heterozygous hemoglobin variants (HbS, HgC, etc)do  not significantly interfere with this assay.   However, presence of multiple variants may affect accuracy.     04/29/2024 5.2 4.0 - 5.6 % Final     Comment:     ADA Screening Guidelines:  5.7-6.4%  Consistent with prediabetes  >or=6.5%  Consistent with diabetes    High levels of fetal hemoglobin interfere with the HbA1C  assay. Heterozygous hemoglobin variants (HbS, HgC, etc)do  not significantly interfere with this assay.    However, presence of multiple variants may affect accuracy.         Statin: Not taking  ACE/ARB: Not taking  Screening or Prevention Patient's value Goal Complete/Controlled?   HgA1C Testing and Control   Lab Results   Component Value Date    HGBA1C 5.4 11/04/2024      Annually/Less than 8% Yes   Lipid profile : 02/07/2023 Annually No   LDL control Lab Results   Component Value Date    LDLCALC 81.6 02/07/2023    Annually/Less than 100 mg/dl  No   Nephropathy screening Lab Results   Component Value Date    LABMICR 1485.0 02/25/2019     Lab Results   Component Value Date    PROTEINUA Negative 11/05/2024    Annually Yes   Blood pressure BP Readings from Last 1 Encounters:   04/03/25 120/80    Less than 140/90 Yes   Dilated retinal exam : 01/17/2025 Annually Yes   Foot exam   Most Recent Foot Exam Date: Not Found Annually No     FIB-4 Calculation: 0.4 at 11/4/2024  8:15 AM  Calculated from:  SGOT/AST: 13 U/L at 11/4/2024  8:15 AM  SGPT/ALT: 12 U/L at 11/4/2024  8:15 AM  Platelets: 273 K/uL at 11/4/2024  8:15 AM  Age: 29 years     FIB-4 below 1.30 is considered as low-risk for advanced fibrosis  FIB-4 over 2.67 is considered as high-risk for advanced fibrosis  FIB-4 values between 1.30 and 2.67 are considered as intermediate-risk of advanced fibrosis for ages 36-64.     For ages > 64 the cut-off for low-risk goes to < 2.  This is a screening tool and clinical judgement should be used in the interpretation of these results.    Kidney Failure Risk Equation (Tangri)    Kidney Failure Risk at 2 years: Unavailable    Kidney Failure Risk at 5 years: Unavailable    Lab Results   Component Value Date    MICALBCREAT 1217.2 (H) 02/25/2019    CREATININE 1.1 02/03/2025       DXA 09/2024  FINDINGS:  The L1 to L4 vertebral bone mineral density is equal to 1.337 g/cm squared with a T score of 1.1.     The left femoral neck bone mineral density is equal to 0.924 g/cm squared with a T score of -0.8.     The right femoral neck bone  mineral density is equal to 0.906 g/cm squared with a T score of -0.9.     Impression:  Normal bone mineral density.      Calcium: denies supplements, vegetables, seafood  Vitamin D: denies supplement    Fracture: Right foot fracture 02/2024 after an injury at the gym while doing sit-ups   Balance: unsteady balance since transplant secondary to dialysis  Exercise: walking    XR right ankle 02/2024  No acute findings evident within the right ankle.  Dorsal soft tissue swelling of the foot is with irregularity of the proximal meta tarsals.  Consider radiographs of the foot clinically warranted.    XR right foot 02/2024  Osteopenia. Acute minimally displaced fractures of the 1st through 5th metatarsal bases. Prominent Lisfranc joint, although suboptimally evaluated on these nonweightbearing radiographs. Acute nondisplaced fracture of the 2nd toe proximal phalangeal base lateral margin. Deformity of the distal lateral margin cuboid bone may be posttraumatic and/or degenerative. Forefoot swelling. Vascular calcifications.     CT right foot 02/2024  Acute minimally displaced comminuted fractures through the bases of the 1st through 5th metatarsals. Many of these fractures are comminuted with fracture lines extending into the tarsometatarsal joints. There is widening of the Lisfranc joint. There is a nondisplaced fracture through the base of the proximal phalanx of the 2nd toe. Suspect subtle avulsion fracture involving the superior aspect of the medial cuneiform. Evaluation of the other tarsal bones somewhat limited by motion. Additional subtle tarsal fractures would be difficult to exclude, including a questionable avulsion fracture of the cuboid. There is subcutaneous edema about the forefoot. Age advanced vascular calcifications noted.     XR 07/2024  Healing comminuted lis aaliyah joint fracture, position alignment stable. Remaining adjacent subcutaneous edema. No new dislocation. Remote posttraumatic change 2nd MTP and  osteopenia.     With regards to Vitamin D Deficiency:    Vit D, 25-Hydroxy   Date Value Ref Range Status   11/04/2024 14 (L) 30 - 96 ng/mL Final     Comment:     Vitamin D deficiency.........<10 ng/mL                              Vitamin D insufficiency......10-29 ng/mL       Vitamin D sufficiency........> or equal to 30 ng/mL  Vitamin D toxicity............>100 ng/mL     11/02/2022 13 (L) 30 - 96 ng/mL Final     Comment:     Vitamin D deficiency.........<10 ng/mL                              Vitamin D insufficiency......10-29 ng/mL       Vitamin D sufficiency........> or equal to 30 ng/mL  Vitamin D toxicity............>100 ng/mL     02/26/2021 11 (L) 30 - 96 ng/mL Final     Comment:     Vitamin D deficiency.........<10 ng/mL                              Vitamin D insufficiency......10-29 ng/mL       Vitamin D sufficiency........> or equal to 30 ng/mL  Vitamin D toxicity............>100 ng/mL     12/01/2020 10 (L) 30 - 96 ng/mL Final     Comment:     Vitamin D deficiency.........<10 ng/mL                              Vitamin D insufficiency......10-29 ng/mL       Vitamin D sufficiency........> or equal to 30 ng/mL  Vitamin D toxicity............>100 ng/mL     08/05/2020 10 (L) 30 - 96 ng/mL Final     Comment:     Vitamin D deficiency.........<10 ng/mL                              Vitamin D insufficiency......10-29 ng/mL       Vitamin D sufficiency........> or equal to 30 ng/mL  Vitamin D toxicity............>100 ng/mL         Review of Systems  As above    Objective:   Physical Exam  Constitutional:       Appearance: Normal appearance.      Comments: Normal gait   Cardiovascular:      Rate and Rhythm: Normal rate.      Comments: No edema  Pulmonary:      Effort: Pulmonary effort is normal.   Neurological:      Mental Status: She is alert.   Psychiatric:         Mood and Affect: Mood normal.         Behavior: Behavior normal.         Visit Vitals  /80 (BP Location: Right arm, Patient Position: Sitting)   Pulse  "98   Ht 5' 4" (1.626 m)   Wt 78.5 kg (173 lb 1 oz)   SpO2 97%   BMI 29.71 kg/m²       Body mass index is 29.71 kg/m².    Lab Review:   Lab Results   Component Value Date    HGBA1C 5.4 11/04/2024    HGBA1C 5.6 07/29/2024    HGBA1C 5.2 04/29/2024       Lab Results   Component Value Date    CHOL 152 02/07/2023    HDL 52 02/07/2023    LDLCALC 81.6 02/07/2023    TRIG 92 02/07/2023    CHOLHDL 34.2 02/07/2023     Lab Results   Component Value Date     02/03/2025    K 4.5 02/03/2025     (H) 02/03/2025    CO2 22 (L) 02/03/2025    GLU 81 02/03/2025    BUN 26 (H) 02/03/2025    CREATININE 1.1 02/03/2025    CALCIUM 9.8 02/03/2025    PROT 7.4 11/04/2024    ALBUMIN 4.0 02/03/2025    BILITOT 0.4 11/04/2024    ALKPHOS 62 11/04/2024    AST 13 11/04/2024    ALT 12 11/04/2024    ANIONGAP 8 02/03/2025    ESTGFRAFRICA 10.4 (A) 05/25/2022    EGFRNONAA 9.1 (A) 05/25/2022    TSH 2.332 09/15/2022     Vit D, 25-Hydroxy   Date Value Ref Range Status   11/04/2024 14 (L) 30 - 96 ng/mL Final     Comment:     Vitamin D deficiency.........<10 ng/mL                              Vitamin D insufficiency......10-29 ng/mL       Vitamin D sufficiency........> or equal to 30 ng/mL  Vitamin D toxicity............>100 ng/mL       Assessment and Plan     1. History of diabetes mellitus, type I        2. Vitamin D deficiency        3. Secondary hyperparathyroidism        4. Lisfranc dislocation, right, initial encounter            History of diabetes mellitus, type I  Resolved since kidney pancreas transplant 2022    Vitamin D deficiency  Not currently on vitamin D supplement  Plan to message transplant team for vitamin D recommendations  Likely contributing to hyperparathyroidism  Plan to re-check vitamin D 3-6 months after D3 supplementation    Secondary hyperparathyroidism  Followed by nephrology and transplant team  BUN elevated but creatinine and GFR within normal range  Also with vitamin D deficiency  Plan to replete vitamin D before " rechecking PTH    Lisfranc dislocation, right, initial encounter   - Right foot Lisranc dislocation/fracture 02/2024   - Osteopenia per right foot XR 02/2024   - Right foot XR 07/2024 with healing    - DXA 09/2024 showed normal bone density of the spine and hip   - Risks include vitamin D deficiency and long-term glucocorticoid use following KP transplant   - Calcium and Vitamin D RDD provided.   - Weight-bearing exercise as tolerated   - Fall precautions advised      Visit today included increased complexity associated with the care of the problems addressed and managing the longitudinal care of the patient due to the serious and/or complex managed problems.    Tracey Cardoso PA-C

## 2025-04-03 ENCOUNTER — OFFICE VISIT (OUTPATIENT)
Dept: ENDOCRINOLOGY | Facility: CLINIC | Age: 30
End: 2025-04-03
Payer: MEDICARE

## 2025-04-03 VITALS
OXYGEN SATURATION: 97 % | SYSTOLIC BLOOD PRESSURE: 120 MMHG | DIASTOLIC BLOOD PRESSURE: 80 MMHG | BODY MASS INDEX: 29.55 KG/M2 | HEIGHT: 64 IN | HEART RATE: 98 BPM | WEIGHT: 173.06 LBS

## 2025-04-03 DIAGNOSIS — E55.9 VITAMIN D DEFICIENCY: ICD-10-CM

## 2025-04-03 DIAGNOSIS — S93.324A LISFRANC DISLOCATION, RIGHT, INITIAL ENCOUNTER: ICD-10-CM

## 2025-04-03 DIAGNOSIS — N25.81 SECONDARY HYPERPARATHYROIDISM: ICD-10-CM

## 2025-04-03 DIAGNOSIS — Z86.39 HISTORY OF DIABETES MELLITUS, TYPE I: Primary | Chronic | ICD-10-CM

## 2025-04-03 PROCEDURE — 99999 PR PBB SHADOW E&M-EST. PATIENT-LVL III: CPT | Mod: PBBFAC,,,

## 2025-04-03 NOTE — ASSESSMENT & PLAN NOTE
Not currently on vitamin D supplement  Plan to message transplant team for vitamin D recommendations  Likely contributing to hyperparathyroidism  Plan to re-check vitamin D 3-6 months after D3 supplementation

## 2025-04-03 NOTE — PATIENT INSTRUCTIONS
I will contact the transplant team for vitamin D recommendations that is safe for the kidneys. I will message you as soon as I get a response.    Estimated Calcium Content of Foods:  Produce  Serving Size Estimated Calcium*    [place] greens, frozen 8 oz 360 mg   Broccoli soraida 8 oz 200 mg   Kale, frozen 8 oz 180 mg   Soy Beans, green, boiled 8 oz 175 mg   Bok [place], cooked, boiled 8 oz 160 mg   Figs, dried 2 figs 65 mg   Broccoli, fresh, cooked 8 oz 60 mg   Oranges 1 whole 55 mg   Seafood Serving Size Estimated Calcium*    Sardines, canned with bones 3 oz 325 mg   [place], canned with bones 3 oz 180 mg   Shrimp, canned 3 oz 125 mg   Dairy Serving Size Estimated Calcium*    Ricotta, part-skim 4 oz 335 mg   Yogurt, plain, low-fat 6 oz 310 mg   Milk, skim, low-fat, whole 8 oz 300 mg   Yogurt with fruit, low-fat 6 oz 260 mg   Mozzarella, part-skim 1 oz 210 mg   Cheddar 1 oz 205 mg   Yogurt, Greek 6 oz 200 mg   American Cheese 1 oz 195 mg   Feta Cheese 4 oz 140 mg   Cottage Cheese, 2% 4 oz 105 mg   Frozen yogurt, vanilla 8 oz 105 mg   Ice Cream, vanilla 8 oz 85 mg   Parmesan 1 tbsp 55 mg   Fortified Food Serving Size Estimated Calcium*   [place] milk, rice milk or soy milk, fortified 8 oz 300 mg   Orange juice and other fruit juices, fortified 8 oz 300 mg   Tofu, prepared with calcium 4 oz 205 mg   Waffle, frozen, fortified 2 pieces 200 mg   Oatmeal, fortified 1 packet 140 mg   English muffin, fortified 1 muffin 100 mg   Cereal, fortified 8 oz 100-1,000 mg   Other Serving Size Estimated Calcium*   Mac & cheese, frozen 1 package 325 mg   Pizza, cheese, frozen 1 serving 115 mg   Pudding, chocolate, prepared with 2% milk 4 oz 160 mg   Beans, baked, canned 4 oz 160 mg   *The calcium content listed for most foods is estimated and can vary due to multiple factors. Check the food label to determine how much calcium is in a particular product.  If you read the nutrition label for a food source, it lists the % calcium in that food.   For an 8 oz glass of milk, for example, the label states calcium 30%.  This is equivalent to 300 mg of calcium (multiply the listed number by 10).   **Table from the [employer]

## 2025-04-03 NOTE — ASSESSMENT & PLAN NOTE
- Right foot Lisranc dislocation/fracture 02/2024   - Osteopenia per right foot XR 02/2024   - Right foot XR 07/2024 with healing    - DXA 09/2024 showed normal bone density of the spine and hip   - Risks include vitamin D deficiency and long-term glucocorticoid use following KP transplant   - Calcium and Vitamin D RDD provided.   - Weight-bearing exercise as tolerated   - Fall precautions advised

## 2025-04-03 NOTE — ASSESSMENT & PLAN NOTE
Followed by nephrology and transplant team  BUN elevated but creatinine and GFR within normal range  Also with vitamin D deficiency  Plan to replete vitamin D before rechecking PTH

## 2025-04-09 ENCOUNTER — PATIENT MESSAGE (OUTPATIENT)
Dept: ENDOCRINOLOGY | Facility: CLINIC | Age: 30
End: 2025-04-09
Payer: MEDICARE

## 2025-04-09 ENCOUNTER — TELEPHONE (OUTPATIENT)
Dept: GASTROENTEROLOGY | Facility: CLINIC | Age: 30
End: 2025-04-09
Payer: MEDICARE

## 2025-04-09 DIAGNOSIS — E55.9 VITAMIN D DEFICIENCY: Primary | ICD-10-CM

## 2025-04-09 DIAGNOSIS — N25.81 SECONDARY HYPERPARATHYROIDISM: ICD-10-CM

## 2025-04-09 NOTE — TELEPHONE ENCOUNTER
MA called and left voicemail for patient stated that we had to move appt time up 20 minutes and if the time does not work patient can call office and we can change it

## 2025-04-14 NOTE — ASSESSMENT & PLAN NOTE
At home on tac 4/4, /500, pred 5  Continue home tac and pred  [facility] on hold for pyelo     Attempted to reach patient. Left VM to call office back.

## 2025-04-23 ENCOUNTER — PATIENT MESSAGE (OUTPATIENT)
Dept: TRANSPLANT | Facility: CLINIC | Age: 30
End: 2025-04-23
Payer: MEDICARE

## 2025-04-23 DIAGNOSIS — Z94.0 KIDNEY REPLACED BY TRANSPLANT: ICD-10-CM

## 2025-04-23 RX ORDER — MYCOPHENOLATE MOFETIL 250 MG/1
750 CAPSULE ORAL 2 TIMES DAILY
Qty: 180 CAPSULE | Refills: 11 | Status: SHIPPED | OUTPATIENT
Start: 2025-04-23 | End: 2026-04-23

## 2025-04-24 DIAGNOSIS — Z94.0 KIDNEY REPLACED BY TRANSPLANT: Primary | ICD-10-CM

## 2025-04-24 DIAGNOSIS — Z94.83 PANCREAS REPLACED BY TRANSPLANT: ICD-10-CM

## 2025-04-24 DIAGNOSIS — E08.22 DIABETES MELLITUS DUE TO UNDERLYING CONDITION WITH DIABETIC CHRONIC KIDNEY DISEASE: ICD-10-CM

## 2025-05-05 ENCOUNTER — PATIENT MESSAGE (OUTPATIENT)
Dept: TRANSPLANT | Facility: CLINIC | Age: 30
End: 2025-05-05
Payer: MEDICARE

## 2025-05-05 ENCOUNTER — RESULTS FOLLOW-UP (OUTPATIENT)
Dept: TRANSPLANT | Facility: HOSPITAL | Age: 30
End: 2025-05-05
Payer: MEDICARE

## 2025-05-05 ENCOUNTER — LAB VISIT (OUTPATIENT)
Dept: LAB | Facility: HOSPITAL | Age: 30
End: 2025-05-05
Attending: INTERNAL MEDICINE
Payer: MEDICARE

## 2025-05-05 DIAGNOSIS — Z94.0 KIDNEY REPLACED BY TRANSPLANT: Primary | ICD-10-CM

## 2025-05-05 DIAGNOSIS — E08.22 DIABETES MELLITUS DUE TO UNDERLYING CONDITION WITH DIABETIC CHRONIC KIDNEY DISEASE: ICD-10-CM

## 2025-05-05 DIAGNOSIS — Z94.0 KIDNEY REPLACED BY TRANSPLANT: ICD-10-CM

## 2025-05-05 DIAGNOSIS — Z94.83 PANCREAS REPLACED BY TRANSPLANT: ICD-10-CM

## 2025-05-05 NOTE — TELEPHONE ENCOUNTER
----- Message from Tammie Broussard DO sent at 5/5/2025  3:52 PM CDT -----  Baseline kidney function but new onset proteinuria.   Can we recheck UPC, renal function and DSA in 2 weeks. She may need a kidney biopsy   ----- Message -----  From: Lab, Background User  Sent: 5/5/2025  12:30 PM CDT  To: Tammie Broussard DO

## 2025-05-05 NOTE — TELEPHONE ENCOUNTER
Per venipuncture patient arrived to lab, submitted urine but left before blood work was drawn. Unable to reach patient by phone, message sent to patient via portal to assess when she can return to lab for blood draw.     ----- Message from Tammie Broussard DO sent at 5/5/2025  3:52 PM CDT -----  Baseline kidney function but new onset proteinuria.   Can we recheck UPC, renal function and DSA in 2 weeks. She may need a kidney biopsy   ----- Message -----  From: Lab, Background User  Sent: 5/5/2025  12:30 PM CDT  To: Tammie Broussard DO

## 2025-05-09 ENCOUNTER — PATIENT MESSAGE (OUTPATIENT)
Dept: TRANSPLANT | Facility: CLINIC | Age: 30
End: 2025-05-09
Payer: MEDICARE

## 2025-05-13 ENCOUNTER — TELEPHONE (OUTPATIENT)
Dept: GASTROENTEROLOGY | Facility: CLINIC | Age: 30
End: 2025-05-13
Payer: MEDICARE

## 2025-05-13 PROBLEM — Z29.89 PROPHYLACTIC IMMUNOTHERAPY: Status: RESOLVED | Noted: 2022-11-03 | Resolved: 2025-05-13
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
## 2025-05-16 ENCOUNTER — LAB VISIT (OUTPATIENT)
Dept: LAB | Facility: HOSPITAL | Age: 30
End: 2025-05-16
Attending: INTERNAL MEDICINE
Payer: MEDICARE

## 2025-05-16 ENCOUNTER — CLINICAL SUPPORT (OUTPATIENT)
Dept: OPHTHALMOLOGY | Facility: CLINIC | Age: 30
End: 2025-05-16
Payer: MEDICARE

## 2025-05-16 ENCOUNTER — RESULTS FOLLOW-UP (OUTPATIENT)
Dept: TRANSPLANT | Facility: CLINIC | Age: 30
End: 2025-05-16
Payer: MEDICARE

## 2025-05-16 ENCOUNTER — OFFICE VISIT (OUTPATIENT)
Dept: OPHTHALMOLOGY | Facility: CLINIC | Age: 30
End: 2025-05-16
Payer: MEDICARE

## 2025-05-16 DIAGNOSIS — Z94.83 PANCREAS REPLACED BY TRANSPLANT: ICD-10-CM

## 2025-05-16 DIAGNOSIS — E10.3592 TYPE 1 DIABETES MELLITUS WITH PROLIFERATIVE RETINOPATHY OF LEFT EYE WITHOUT MACULAR EDEMA: ICD-10-CM

## 2025-05-16 DIAGNOSIS — E08.22 DIABETES MELLITUS DUE TO UNDERLYING CONDITION WITH DIABETIC CHRONIC KIDNEY DISEASE: ICD-10-CM

## 2025-05-16 DIAGNOSIS — E10.3541: Primary | ICD-10-CM

## 2025-05-16 DIAGNOSIS — R80.9 PROTEINURIA, UNSPECIFIED TYPE: Primary | ICD-10-CM

## 2025-05-16 DIAGNOSIS — H35.21 PROLIFERATIVE VITREORETINOPATHY OF RIGHT EYE: ICD-10-CM

## 2025-05-16 DIAGNOSIS — Z94.0 KIDNEY REPLACED BY TRANSPLANT: ICD-10-CM

## 2025-05-16 LAB
ABSOLUTE EOSINOPHIL (OHS): 0.07 K/UL
ABSOLUTE MONOCYTE (OHS): 0.59 K/UL (ref 0.3–1)
ABSOLUTE NEUTROPHIL COUNT (OHS): 1.76 K/UL (ref 1.8–7.7)
ALBUMIN SERPL BCP-MCNC: 4.2 G/DL (ref 3.5–5.2)
ALP SERPL-CCNC: 79 UNIT/L (ref 40–150)
ALT SERPL W/O P-5'-P-CCNC: 12 UNIT/L (ref 10–44)
AMYLASE SERPL-CCNC: 125 UNIT/L (ref 20–110)
ANION GAP (OHS): 8 MMOL/L (ref 8–16)
AST SERPL-CCNC: 13 UNIT/L (ref 11–45)
BASOPHILS # BLD AUTO: 0.02 K/UL
BASOPHILS NFR BLD AUTO: 0.5 %
BILIRUB DIRECT SERPL-MCNC: 0.1 MG/DL (ref 0.1–0.3)
BILIRUB SERPL-MCNC: 0.3 MG/DL (ref 0.1–1)
BUN SERPL-MCNC: 33 MG/DL (ref 6–20)
CALCIUM SERPL-MCNC: 9.5 MG/DL (ref 8.7–10.5)
CHLORIDE SERPL-SCNC: 110 MMOL/L (ref 95–110)
CO2 SERPL-SCNC: 22 MMOL/L (ref 23–29)
CREAT SERPL-MCNC: 1.2 MG/DL (ref 0.5–1.4)
EAG (OHS): 105 MG/DL (ref 68–131)
ERYTHROCYTE [DISTWIDTH] IN BLOOD BY AUTOMATED COUNT: 14.5 % (ref 11.5–14.5)
GFR SERPLBLD CREATININE-BSD FMLA CKD-EPI: >60 ML/MIN/1.73/M2
GLUCOSE SERPL-MCNC: 87 MG/DL (ref 70–110)
HBA1C MFR BLD: 5.3 % (ref 4–5.6)
HCT VFR BLD AUTO: 36.7 % (ref 37–48.5)
HGB BLD-MCNC: 11.2 GM/DL (ref 12–16)
IMM GRANULOCYTES # BLD AUTO: 0.02 K/UL (ref 0–0.04)
IMM GRANULOCYTES NFR BLD AUTO: 0.5 % (ref 0–0.5)
LIPASE SERPL-CCNC: 28 U/L (ref 4–60)
LYMPHOCYTES # BLD AUTO: 1.93 K/UL (ref 1–4.8)
MAGNESIUM SERPL-MCNC: 1.7 MG/DL (ref 1.6–2.6)
MCH RBC QN AUTO: 27.7 PG (ref 27–31)
MCHC RBC AUTO-ENTMCNC: 30.5 G/DL (ref 32–36)
MCV RBC AUTO: 91 FL (ref 82–98)
NUCLEATED RBC (/100WBC) (OHS): 0 /100 WBC
PHOSPHATE SERPL-MCNC: 3.7 MG/DL (ref 2.7–4.5)
PLATELET # BLD AUTO: 267 K/UL (ref 150–450)
PMV BLD AUTO: 10.7 FL (ref 9.2–12.9)
POTASSIUM SERPL-SCNC: 4.5 MMOL/L (ref 3.5–5.1)
PROT SERPL-MCNC: 7.9 GM/DL (ref 6–8.4)
RBC # BLD AUTO: 4.05 M/UL (ref 4–5.4)
RELATIVE EOSINOPHIL (OHS): 1.6 %
RELATIVE LYMPHOCYTE (OHS): 44 % (ref 18–48)
RELATIVE MONOCYTE (OHS): 13.4 % (ref 4–15)
RELATIVE NEUTROPHIL (OHS): 40 % (ref 38–73)
SODIUM SERPL-SCNC: 140 MMOL/L (ref 136–145)
TACROLIMUS BLD-MCNC: 13.1 NG/ML (ref 5–15)
WBC # BLD AUTO: 4.39 K/UL (ref 3.9–12.7)

## 2025-05-16 PROCEDURE — 1159F MED LIST DOCD IN RCRD: CPT | Mod: CPTII,S$GLB,, | Performed by: OPHTHALMOLOGY

## 2025-05-16 PROCEDURE — 83735 ASSAY OF MAGNESIUM: CPT

## 2025-05-16 PROCEDURE — 84132 ASSAY OF SERUM POTASSIUM: CPT

## 2025-05-16 PROCEDURE — 99999 PR PBB SHADOW E&M-EST. PATIENT-LVL II: CPT | Mod: PBBFAC,,, | Performed by: OPHTHALMOLOGY

## 2025-05-16 PROCEDURE — 85025 COMPLETE CBC W/AUTO DIFF WBC: CPT

## 2025-05-16 PROCEDURE — 86833 HLA CLASS II HIGH DEFIN QUAL: CPT | Performed by: INTERNAL MEDICINE

## 2025-05-16 PROCEDURE — 82040 ASSAY OF SERUM ALBUMIN: CPT

## 2025-05-16 PROCEDURE — 36415 COLL VENOUS BLD VENIPUNCTURE: CPT

## 2025-05-16 PROCEDURE — 3044F HG A1C LEVEL LT 7.0%: CPT | Mod: CPTII,S$GLB,, | Performed by: OPHTHALMOLOGY

## 2025-05-16 PROCEDURE — 92134 CPTRZ OPH DX IMG PST SGM RTA: CPT | Mod: S$GLB,,, | Performed by: OPHTHALMOLOGY

## 2025-05-16 PROCEDURE — 83690 ASSAY OF LIPASE: CPT

## 2025-05-16 PROCEDURE — 83036 HEMOGLOBIN GLYCOSYLATED A1C: CPT

## 2025-05-16 PROCEDURE — 1160F RVW MEDS BY RX/DR IN RCRD: CPT | Mod: CPTII,S$GLB,, | Performed by: OPHTHALMOLOGY

## 2025-05-16 PROCEDURE — 84100 ASSAY OF PHOSPHORUS: CPT

## 2025-05-16 PROCEDURE — 87799 DETECT AGENT NOS DNA QUANT: CPT

## 2025-05-16 PROCEDURE — 86977 RBC SERUM PRETX INCUBJ/INHIB: CPT | Mod: 59 | Performed by: INTERNAL MEDICINE

## 2025-05-16 PROCEDURE — 86832 HLA CLASS I HIGH DEFIN QUAL: CPT | Performed by: INTERNAL MEDICINE

## 2025-05-16 PROCEDURE — 80197 ASSAY OF TACROLIMUS: CPT

## 2025-05-16 PROCEDURE — 92014 COMPRE OPH EXAM EST PT 1/>: CPT | Mod: S$GLB,,, | Performed by: OPHTHALMOLOGY

## 2025-05-16 PROCEDURE — 2022F DILAT RTA XM EVC RTNOPTHY: CPT | Mod: CPTII,S$GLB,, | Performed by: OPHTHALMOLOGY

## 2025-05-16 PROCEDURE — 82150 ASSAY OF AMYLASE: CPT

## 2025-05-16 NOTE — PROGRESS NOTES
Subjective:       Patient ID: Isabela Read is a 30 y.o. female      Chief Complaint   Patient presents with    Diabetic Eye Exam     History of Present Illness  HPI    Vision stable and good OU    No f/fpain OU    No gtts  Last edited by Maximilian Montaño MD on 5/16/2025 12:24 PM.        Imaging:    See report    Assessment/Plan:     1. Type 1 diabetes mellitus with right eye affected by proliferative retinopathy and combined traction and rhegmatogenous retinal detachment (Primary)  Multiple surgeries, attached, doing well    - Posterior Segment OCT Retina-Both eyes    2. Proliferative vitreoretinopathy of right eye  See #1    3. Type 1 diabetes mellitus with proliferative retinopathy of left eye without macular edema  S/p PRP  No traction.    Pt relatively intolerant of laser in clinic  Stable    Diabetic Retinopathy discussed in detail, all questions answered  Stressed importance of good BS/BP/Chol Control  RTC immediately PRN any vision changes, laure blurry vision, missing vision, floaters, distortions, etc      Refraction      Follow up in about 6 months (around 11/16/2025), or if symptoms worsen or fail to improve, for Comprehensive Examination (DILATE OU), OCT Mac.

## 2025-05-16 NOTE — TELEPHONE ENCOUNTER
----- Message from Tammie Broussard DO sent at 5/16/2025  2:44 PM CDT -----  Near baseline kidney and pancreas graft function  High FK. Is it a true FK trough? If so, decrease Fk to 4/3 from 4 BID   ----- Message -----  From: Lab, Background User  Sent: 5/16/2025  10:50 AM CDT  To: Tammie Broussard DO

## 2025-05-19 ENCOUNTER — LAB VISIT (OUTPATIENT)
Dept: LAB | Facility: HOSPITAL | Age: 30
End: 2025-05-19
Attending: INTERNAL MEDICINE
Payer: MEDICARE

## 2025-05-19 ENCOUNTER — OFFICE VISIT (OUTPATIENT)
Dept: TRANSPLANT | Facility: CLINIC | Age: 30
End: 2025-05-19
Payer: MEDICARE

## 2025-05-19 VITALS
RESPIRATION RATE: 18 BRPM | WEIGHT: 169.31 LBS | HEIGHT: 64 IN | TEMPERATURE: 97 F | HEART RATE: 107 BPM | DIASTOLIC BLOOD PRESSURE: 76 MMHG | BODY MASS INDEX: 28.91 KG/M2 | OXYGEN SATURATION: 100 % | SYSTOLIC BLOOD PRESSURE: 118 MMHG

## 2025-05-19 DIAGNOSIS — Z94.0 STATUS POST DECEASED-DONOR KIDNEY TRANSPLANTATION: Primary | Chronic | ICD-10-CM

## 2025-05-19 DIAGNOSIS — Z79.60 IMMUNODEFICIENCY DUE TO LONG TERM IMMUNOSUPPRESSIVE DRUG THERAPY: ICD-10-CM

## 2025-05-19 DIAGNOSIS — Z94.0 KIDNEY REPLACED BY TRANSPLANT: Primary | ICD-10-CM

## 2025-05-19 DIAGNOSIS — I15.0 RENOVASCULAR HYPERTENSION: ICD-10-CM

## 2025-05-19 DIAGNOSIS — Z94.0 STATUS POST SIMULTANEOUS KIDNEY AND PANCREAS TRANSPLANT: ICD-10-CM

## 2025-05-19 DIAGNOSIS — D84.821 IMMUNODEFICIENCY DUE TO LONG TERM IMMUNOSUPPRESSIVE DRUG THERAPY: ICD-10-CM

## 2025-05-19 DIAGNOSIS — T45.1X5A IMMUNODEFICIENCY DUE TO LONG TERM IMMUNOSUPPRESSIVE DRUG THERAPY: ICD-10-CM

## 2025-05-19 DIAGNOSIS — Z94.83 STATUS POST SIMULTANEOUS KIDNEY AND PANCREAS TRANSPLANT: ICD-10-CM

## 2025-05-19 DIAGNOSIS — N25.81 SECONDARY HYPERPARATHYROIDISM: ICD-10-CM

## 2025-05-19 DIAGNOSIS — Z91.89 AT RISK FOR OPPORTUNISTIC INFECTIONS: ICD-10-CM

## 2025-05-19 DIAGNOSIS — R80.9 PROTEINURIA, UNSPECIFIED TYPE: ICD-10-CM

## 2025-05-19 LAB
CLASS I ANTIBODY COMMENTS - LUMINEX: NORMAL
CLASS II ANTIBODY COMMENTS - LUMINEX: NORMAL
CREAT UR-MCNC: 114 MG/DL (ref 15–325)
DSA1 TESTING DATE: NORMAL
DSA12 TESTING DATE: NORMAL
DSA2 TESTING DATE: NORMAL
PROT UR-MCNC: 11 MG/DL
PROT/CREAT UR: 0.1 MG/G{CREAT}
SERUM COLLECTION DT - LUMINEX CLASS I: NORMAL
SERUM COLLECTION DT - LUMINEX CLASS II: NORMAL
W BK VIRUS DNA, QUALITATIVE, PLASMA: NOT DETECTED
W BK VIRUS DNA, QUANTITATIVE, PLASMA: <125 COPIES/ML
W LOG BK VIRUS DNA, PLASMA: <2.1 LOG (10) COPIES/ML

## 2025-05-19 PROCEDURE — 99215 OFFICE O/P EST HI 40 MIN: CPT | Mod: S$GLB,,, | Performed by: NURSE PRACTITIONER

## 2025-05-19 PROCEDURE — 3078F DIAST BP <80 MM HG: CPT | Mod: CPTII,S$GLB,, | Performed by: NURSE PRACTITIONER

## 2025-05-19 PROCEDURE — 84156 ASSAY OF PROTEIN URINE: CPT

## 2025-05-19 PROCEDURE — 3008F BODY MASS INDEX DOCD: CPT | Mod: CPTII,S$GLB,, | Performed by: NURSE PRACTITIONER

## 2025-05-19 PROCEDURE — 1159F MED LIST DOCD IN RCRD: CPT | Mod: CPTII,S$GLB,, | Performed by: NURSE PRACTITIONER

## 2025-05-19 PROCEDURE — 3044F HG A1C LEVEL LT 7.0%: CPT | Mod: CPTII,S$GLB,, | Performed by: NURSE PRACTITIONER

## 2025-05-19 PROCEDURE — 3074F SYST BP LT 130 MM HG: CPT | Mod: CPTII,S$GLB,, | Performed by: NURSE PRACTITIONER

## 2025-05-19 PROCEDURE — 1160F RVW MEDS BY RX/DR IN RCRD: CPT | Mod: CPTII,S$GLB,, | Performed by: NURSE PRACTITIONER

## 2025-05-19 PROCEDURE — 99999 PR PBB SHADOW E&M-EST. PATIENT-LVL IV: CPT | Mod: PBBFAC,,, | Performed by: NURSE PRACTITIONER

## 2025-05-19 RX ORDER — TACROLIMUS 1 MG/1
4 CAPSULE ORAL EVERY 12 HOURS
Qty: 240 CAPSULE | Refills: 11 | Status: SHIPPED | OUTPATIENT
Start: 2025-05-19 | End: 2025-05-21

## 2025-05-19 RX ORDER — SODIUM BICARBONATE 650 MG/1
1300 TABLET ORAL 3 TIMES DAILY
Qty: 180 TABLET | Refills: 11 | Status: SHIPPED | OUTPATIENT
Start: 2025-05-19 | End: 2026-05-19

## 2025-05-19 NOTE — TELEPHONE ENCOUNTER
Patient states she took medication late the night before labs were drawn and therefore level is not an accurate trough. Plan to repeat level.    ----- Message from Tammie Broussard DO sent at 5/16/2025  2:44 PM CDT -----  Near baseline kidney and pancreas graft function  High FK. Is it a true FK trough? If so, decrease Fk to 4/3 from 4 BID   ----- Message -----  From: Lab, Background User  Sent: 5/16/2025  10:50 AM CDT  To: Tammie Broussard DO

## 2025-05-19 NOTE — LETTER
May 19, 2025        Tai Camilo  [address]  [facility] [address]  Phone: [phone]  Fax: [phone]             [facility] 1st Fl  [address]  [facility] [address]  Phone: [phone]   Patient: Isabela Read   MR Number: [id number]   YOB: 1995   Date of Visit: 5/19/2025       Dear Dr. Tai Camilo    Thank you for referring Isabela Read to me for evaluation. Attached you will find relevant portions of my assessment and plan of care.    If you have questions, please do not hesitate to call me. I look forward to following Isabela Read along with you.    Sincerely,    Soraida Hollis, NP    Enclosure    If you would like to receive this communication electronically, please contact [email or web address] or [phone] to request [employer] Link access.    [employer] Link is a tool which provides read-only access to select patient information with whom you have a relationship. Its easy to use and provides real time access to review your patients record including encounter summaries, notes, results, and demographic information.    If you feel you have received this communication in error or would no longer like to receive these types of communications, please e-mail [email or web address]

## 2025-05-21 DIAGNOSIS — Z94.0 STATUS POST SIMULTANEOUS KIDNEY AND PANCREAS TRANSPLANT: ICD-10-CM

## 2025-05-21 DIAGNOSIS — Z94.83 STATUS POST SIMULTANEOUS KIDNEY AND PANCREAS TRANSPLANT: ICD-10-CM

## 2025-05-21 RX ORDER — TACROLIMUS 1 MG/1
CAPSULE ORAL
Qty: 210 CAPSULE | Refills: 11 | Status: SHIPPED | OUTPATIENT
Start: 2025-05-21

## 2025-05-23 DIAGNOSIS — R80.9 PROTEINURIA, UNSPECIFIED TYPE: Primary | ICD-10-CM

## 2025-06-05 ENCOUNTER — RESULTS FOLLOW-UP (OUTPATIENT)
Dept: TRANSPLANT | Facility: CLINIC | Age: 30
End: 2025-06-05

## 2025-06-05 ENCOUNTER — LAB VISIT (OUTPATIENT)
Dept: LAB | Facility: HOSPITAL | Age: 30
End: 2025-06-05
Attending: INTERNAL MEDICINE
Payer: MEDICARE

## 2025-06-05 DIAGNOSIS — Z94.0 KIDNEY REPLACED BY TRANSPLANT: ICD-10-CM

## 2025-06-05 LAB — TACROLIMUS BLD-MCNC: 8.7 NG/ML (ref 5–15)

## 2025-06-05 PROCEDURE — 80197 ASSAY OF TACROLIMUS: CPT

## 2025-06-05 PROCEDURE — 36415 COLL VENOUS BLD VENIPUNCTURE: CPT

## 2025-06-30 ENCOUNTER — TELEPHONE (OUTPATIENT)
Dept: INTERNAL MEDICINE | Facility: CLINIC | Age: 30
End: 2025-06-30
Payer: MEDICARE

## 2025-07-01 ENCOUNTER — OFFICE VISIT (OUTPATIENT)
Dept: INTERNAL MEDICINE | Facility: CLINIC | Age: 30
End: 2025-07-01
Attending: FAMILY MEDICINE
Payer: MEDICARE

## 2025-07-01 VITALS
BODY MASS INDEX: 29.24 KG/M2 | OXYGEN SATURATION: 98 % | SYSTOLIC BLOOD PRESSURE: 113 MMHG | DIASTOLIC BLOOD PRESSURE: 68 MMHG | HEART RATE: 106 BPM | HEIGHT: 64 IN | WEIGHT: 171.31 LBS

## 2025-07-01 DIAGNOSIS — G89.29 CHRONIC PAIN OF LEFT ANKLE: Primary | ICD-10-CM

## 2025-07-01 DIAGNOSIS — M79.605 PAIN IN LEFT LEG: ICD-10-CM

## 2025-07-01 DIAGNOSIS — Z94.0 STATUS POST DECEASED-DONOR KIDNEY TRANSPLANTATION: Chronic | ICD-10-CM

## 2025-07-01 DIAGNOSIS — M25.572 CHRONIC PAIN OF LEFT ANKLE: Primary | ICD-10-CM

## 2025-07-01 DIAGNOSIS — Z86.39 HISTORY OF DIABETES MELLITUS, TYPE I: Chronic | ICD-10-CM

## 2025-07-01 DIAGNOSIS — E78.5 HYPERLIPIDEMIA, UNSPECIFIED HYPERLIPIDEMIA TYPE: ICD-10-CM

## 2025-07-01 PROBLEM — J84.9 INTERSTITIAL PULMONARY DISEASE, UNSPECIFIED: Chronic | Status: RESOLVED | Noted: 2023-04-21 | Resolved: 2025-07-01

## 2025-07-01 PROBLEM — I27.20 PULMONARY HYPERTENSION: Status: RESOLVED | Noted: 2024-10-14 | Resolved: 2025-07-01

## 2025-07-01 PROBLEM — I50.30 (HFPEF) HEART FAILURE WITH PRESERVED EJECTION FRACTION: Chronic | Status: RESOLVED | Noted: 2022-05-25 | Resolved: 2025-07-01

## 2025-07-01 PROCEDURE — 3044F HG A1C LEVEL LT 7.0%: CPT | Mod: CPTII,S$GLB,, | Performed by: FAMILY MEDICINE

## 2025-07-01 PROCEDURE — 1160F RVW MEDS BY RX/DR IN RCRD: CPT | Mod: CPTII,S$GLB,, | Performed by: FAMILY MEDICINE

## 2025-07-01 PROCEDURE — G2211 COMPLEX E/M VISIT ADD ON: HCPCS | Mod: S$GLB,,, | Performed by: FAMILY MEDICINE

## 2025-07-01 PROCEDURE — 3074F SYST BP LT 130 MM HG: CPT | Mod: CPTII,S$GLB,, | Performed by: FAMILY MEDICINE

## 2025-07-01 PROCEDURE — 3078F DIAST BP <80 MM HG: CPT | Mod: CPTII,S$GLB,, | Performed by: FAMILY MEDICINE

## 2025-07-01 PROCEDURE — 3008F BODY MASS INDEX DOCD: CPT | Mod: CPTII,S$GLB,, | Performed by: FAMILY MEDICINE

## 2025-07-01 PROCEDURE — 99999 PR PBB SHADOW E&M-EST. PATIENT-LVL III: CPT | Mod: PBBFAC,,, | Performed by: FAMILY MEDICINE

## 2025-07-01 PROCEDURE — 1159F MED LIST DOCD IN RCRD: CPT | Mod: CPTII,S$GLB,, | Performed by: FAMILY MEDICINE

## 2025-07-01 PROCEDURE — 99214 OFFICE O/P EST MOD 30 MIN: CPT | Mod: S$GLB,,, | Performed by: FAMILY MEDICINE
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
## 2025-07-01 NOTE — ASSESSMENT & PLAN NOTE
-previously on statin assc w/ DM1  -stopped p kidney/pancreas transplant circa 2022  -anti-rejection meds

## 2025-07-21 ENCOUNTER — PATIENT MESSAGE (OUTPATIENT)
Dept: TRANSPLANT | Facility: CLINIC | Age: 30
End: 2025-07-21
Payer: MEDICARE

## 2025-07-23 ENCOUNTER — HOSPITAL ENCOUNTER (OUTPATIENT)
Dept: CARDIOLOGY | Facility: HOSPITAL | Age: 30
Discharge: HOME | End: 2025-07-23
Attending: FAMILY MEDICINE
Payer: MEDICARE

## 2025-07-23 DIAGNOSIS — Z94.0 KIDNEY REPLACED BY TRANSPLANT: Primary | ICD-10-CM

## 2025-07-23 DIAGNOSIS — G89.29 CHRONIC PAIN OF LEFT ANKLE: ICD-10-CM

## 2025-07-23 DIAGNOSIS — E08.22 DIABETES MELLITUS DUE TO UNDERLYING CONDITION WITH DIABETIC CHRONIC KIDNEY DISEASE, UNSPECIFIED CKD STAGE, UNSPECIFIED WHETHER LONG TERM INSULIN USE: ICD-10-CM

## 2025-07-23 DIAGNOSIS — Z94.83 PANCREAS REPLACED BY TRANSPLANT: ICD-10-CM

## 2025-07-23 DIAGNOSIS — M79.605 PAIN IN LEFT LEG: ICD-10-CM

## 2025-07-23 DIAGNOSIS — M25.572 CHRONIC PAIN OF LEFT ANKLE: ICD-10-CM

## 2025-07-23 PROCEDURE — 93971 EXTREMITY STUDY: CPT | Mod: LT

## 2025-07-23 PROCEDURE — 93971 EXTREMITY STUDY: CPT | Mod: 26,LT,, | Performed by: INTERNAL MEDICINE

## 2025-08-11 ENCOUNTER — OFFICE VISIT (OUTPATIENT)
Dept: OBSTETRICS AND GYNECOLOGY | Facility: CLINIC | Age: 30
End: 2025-08-11
Payer: MEDICARE

## 2025-08-11 VITALS
HEART RATE: 111 BPM | SYSTOLIC BLOOD PRESSURE: 124 MMHG | BODY MASS INDEX: 29.74 KG/M2 | HEIGHT: 64 IN | WEIGHT: 174.19 LBS | RESPIRATION RATE: 18 BRPM | OXYGEN SATURATION: 98 % | DIASTOLIC BLOOD PRESSURE: 82 MMHG

## 2025-08-11 DIAGNOSIS — Z12.4 PAP SMEAR FOR CERVICAL CANCER SCREENING: ICD-10-CM

## 2025-08-11 DIAGNOSIS — Z11.3 ROUTINE SCREENING FOR STI (SEXUALLY TRANSMITTED INFECTION): ICD-10-CM

## 2025-08-11 DIAGNOSIS — Z01.419 ENCOUNTER FOR GYNECOLOGICAL EXAMINATION (GENERAL) (ROUTINE) WITHOUT ABNORMAL FINDINGS: Primary | ICD-10-CM

## 2025-08-11 PROCEDURE — 87591 N.GONORRHOEAE DNA AMP PROB: CPT

## 2025-08-11 PROCEDURE — 99999 PR PBB SHADOW E&M-EST. PATIENT-LVL III: CPT | Mod: PBBFAC,,,

## 2025-08-11 PROCEDURE — 3044F HG A1C LEVEL LT 7.0%: CPT | Mod: CPTII,S$GLB,,

## 2025-08-11 PROCEDURE — 1159F MED LIST DOCD IN RCRD: CPT | Mod: CPTII,S$GLB,,

## 2025-08-11 PROCEDURE — 99385 PREV VISIT NEW AGE 18-39: CPT | Mod: S$GLB,,,

## 2025-08-11 PROCEDURE — 88175 CYTOPATH C/V AUTO FLUID REDO: CPT | Mod: TC

## 2025-08-11 PROCEDURE — 3079F DIAST BP 80-89 MM HG: CPT | Mod: CPTII,S$GLB,,

## 2025-08-11 PROCEDURE — 87624 HPV HI-RISK TYP POOLED RSLT: CPT

## 2025-08-11 PROCEDURE — 3074F SYST BP LT 130 MM HG: CPT | Mod: CPTII,S$GLB,,

## 2025-08-11 PROCEDURE — 1160F RVW MEDS BY RX/DR IN RCRD: CPT | Mod: CPTII,S$GLB,,

## 2025-08-11 PROCEDURE — 3008F BODY MASS INDEX DOCD: CPT | Mod: CPTII,S$GLB,,

## 2025-08-12 ENCOUNTER — LAB VISIT (OUTPATIENT)
Dept: LAB | Facility: HOSPITAL | Age: 30
End: 2025-08-12
Attending: INTERNAL MEDICINE
Payer: MEDICARE

## 2025-08-12 DIAGNOSIS — E08.22 DIABETES MELLITUS DUE TO UNDERLYING CONDITION WITH DIABETIC CHRONIC KIDNEY DISEASE, UNSPECIFIED CKD STAGE, UNSPECIFIED WHETHER LONG TERM INSULIN USE: ICD-10-CM

## 2025-08-12 DIAGNOSIS — Z94.0 KIDNEY REPLACED BY TRANSPLANT: ICD-10-CM

## 2025-08-12 DIAGNOSIS — Z94.83 PANCREAS REPLACED BY TRANSPLANT: ICD-10-CM

## 2025-08-12 LAB
ABSOLUTE EOSINOPHIL (OHS): 0.11 K/UL
ABSOLUTE MONOCYTE (OHS): 0.42 K/UL (ref 0.3–1)
ABSOLUTE NEUTROPHIL COUNT (OHS): 2.03 K/UL (ref 1.8–7.7)
ALBUMIN SERPL BCP-MCNC: 3.9 G/DL (ref 3.5–5.2)
ALP SERPL-CCNC: 74 UNIT/L (ref 40–150)
ALT SERPL W/O P-5'-P-CCNC: 16 UNIT/L (ref 0–55)
AMYLASE SERPL-CCNC: 117 UNIT/L (ref 20–110)
ANION GAP (OHS): 8 MMOL/L (ref 8–16)
AST SERPL-CCNC: 18 UNIT/L (ref 0–50)
BASOPHILS # BLD AUTO: 0.02 K/UL
BASOPHILS NFR BLD AUTO: 0.5 %
BILIRUB DIRECT SERPL-MCNC: 0.1 MG/DL (ref 0.1–0.3)
BILIRUB SERPL-MCNC: 0.3 MG/DL (ref 0.1–1)
BUN SERPL-MCNC: 26 MG/DL (ref 6–20)
C TRACH DNA SPEC QL NAA+PROBE: NOT DETECTED
CALCIUM SERPL-MCNC: 9.2 MG/DL (ref 8.7–10.5)
CHLORIDE SERPL-SCNC: 110 MMOL/L (ref 95–110)
CO2 SERPL-SCNC: 22 MMOL/L (ref 23–29)
CREAT SERPL-MCNC: 1.1 MG/DL (ref 0.5–1.4)
CTGC SOURCE (OHS) ORD-325: NORMAL
EAG (OHS): 103 MG/DL (ref 68–131)
ERYTHROCYTE [DISTWIDTH] IN BLOOD BY AUTOMATED COUNT: 14 % (ref 11.5–14.5)
GFR SERPLBLD CREATININE-BSD FMLA CKD-EPI: >60 ML/MIN/1.73/M2
GLUCOSE SERPL-MCNC: 83 MG/DL (ref 70–110)
HBA1C MFR BLD: 5.2 % (ref 4–5.6)
HCT VFR BLD AUTO: 38.5 % (ref 37–48.5)
HGB BLD-MCNC: 12.1 GM/DL (ref 12–16)
IMM GRANULOCYTES # BLD AUTO: 0 K/UL (ref 0–0.04)
IMM GRANULOCYTES NFR BLD AUTO: 0 % (ref 0–0.5)
LIPASE SERPL-CCNC: 30 U/L (ref 4–60)
LYMPHOCYTES # BLD AUTO: 1.66 K/UL (ref 1–4.8)
MAGNESIUM SERPL-MCNC: 1.6 MG/DL (ref 1.6–2.6)
MCH RBC QN AUTO: 28.5 PG (ref 27–31)
MCHC RBC AUTO-ENTMCNC: 31.4 G/DL (ref 32–36)
MCV RBC AUTO: 91 FL (ref 82–98)
N GONORRHOEA DNA UR QL NAA+PROBE: NOT DETECTED
NUCLEATED RBC (/100WBC) (OHS): 0 /100 WBC
PHOSPHATE SERPL-MCNC: 3.6 MG/DL (ref 2.7–4.5)
PLATELET # BLD AUTO: 181 K/UL (ref 150–450)
PMV BLD AUTO: 11.5 FL (ref 9.2–12.9)
POTASSIUM SERPL-SCNC: 4.2 MMOL/L (ref 3.5–5.1)
PROT SERPL-MCNC: 7.3 GM/DL (ref 6–8.4)
RBC # BLD AUTO: 4.25 M/UL (ref 4–5.4)
RELATIVE EOSINOPHIL (OHS): 2.6 %
RELATIVE LYMPHOCYTE (OHS): 39.2 % (ref 18–48)
RELATIVE MONOCYTE (OHS): 9.9 % (ref 4–15)
RELATIVE NEUTROPHIL (OHS): 47.8 % (ref 38–73)
SODIUM SERPL-SCNC: 140 MMOL/L (ref 136–145)
TACROLIMUS BLD-MCNC: 9.3 NG/ML (ref 5–15)
WBC # BLD AUTO: 4.24 K/UL (ref 3.9–12.7)

## 2025-08-12 PROCEDURE — 84100 ASSAY OF PHOSPHORUS: CPT

## 2025-08-12 PROCEDURE — 83735 ASSAY OF MAGNESIUM: CPT

## 2025-08-12 PROCEDURE — 80197 ASSAY OF TACROLIMUS: CPT

## 2025-08-12 PROCEDURE — 85025 COMPLETE CBC W/AUTO DIFF WBC: CPT

## 2025-08-12 PROCEDURE — 82248 BILIRUBIN DIRECT: CPT

## 2025-08-12 PROCEDURE — 36415 COLL VENOUS BLD VENIPUNCTURE: CPT

## 2025-08-12 PROCEDURE — 80053 COMPREHEN METABOLIC PANEL: CPT

## 2025-08-12 PROCEDURE — 82150 ASSAY OF AMYLASE: CPT

## 2025-08-12 PROCEDURE — 83036 HEMOGLOBIN GLYCOSYLATED A1C: CPT

## 2025-08-12 PROCEDURE — 83690 ASSAY OF LIPASE: CPT

## 2025-08-14 LAB
INSULIN SERPL-ACNC: NORMAL U[IU]/ML
LAB AP BETHESDA CATEGORY: NORMAL
LAB AP CLINICAL FINDINGS: NORMAL
LAB AP CONTRACEPTIVES: NORMAL
LAB AP GYN ADDITIONAL FINDINGS: NORMAL
LAB AP OCHS PAP SPECIMEN ADEQUACY: NORMAL
LAB AP OHS PAP INTERPRETATION: NORMAL
LAB AP PAP DISCLAIMER COMMENTS: NORMAL
LAB AP PAP ESTROGEN REPLACEMENT THERAPY: NORMAL
LAB AP PAP PMP: NORMAL
LAB AP PAP PREVIOUS BX: NORMAL
LAB AP PAP PRIOR TREATMENT: NORMAL
LAB AP PERFORMING LOCATION(S): NORMAL

## 2025-08-18 ENCOUNTER — RESULTS FOLLOW-UP (OUTPATIENT)
Dept: OBSTETRICS AND GYNECOLOGY | Facility: CLINIC | Age: 30
End: 2025-08-18
Payer: MEDICARE

## 2025-08-19 RX ORDER — METRONIDAZOLE 500 MG/1
500 TABLET ORAL 2 TIMES DAILY
Qty: 14 TABLET | Refills: 0 | Status: SHIPPED | OUTPATIENT
Start: 2025-08-19 | End: 2025-08-27

## 2025-08-19 RX ORDER — FLUCONAZOLE 150 MG/1
150 TABLET ORAL ONCE
Qty: 1 TABLET | Refills: 0 | Status: SHIPPED | OUTPATIENT
Start: 2025-08-19 | End: 2025-08-21

## 2025-09-03 ENCOUNTER — OFFICE VISIT (OUTPATIENT)
Dept: PODIATRY | Facility: CLINIC | Age: 30
End: 2025-09-03
Payer: MEDICARE

## 2025-09-03 VITALS
BODY MASS INDEX: 29.74 KG/M2 | DIASTOLIC BLOOD PRESSURE: 88 MMHG | HEART RATE: 101 BPM | HEIGHT: 64 IN | SYSTOLIC BLOOD PRESSURE: 151 MMHG | WEIGHT: 174.19 LBS

## 2025-09-03 DIAGNOSIS — G60.9 IDIOPATHIC PERIPHERAL NEUROPATHY: ICD-10-CM

## 2025-09-03 DIAGNOSIS — E10.42 TYPE 1 DIABETES MELLITUS WITH DIABETIC POLYNEUROPATHY: Primary | ICD-10-CM

## 2025-09-03 PROCEDURE — 3008F BODY MASS INDEX DOCD: CPT | Mod: CPTII,S$GLB,, | Performed by: PODIATRIST

## 2025-09-03 PROCEDURE — 3079F DIAST BP 80-89 MM HG: CPT | Mod: CPTII,S$GLB,, | Performed by: PODIATRIST

## 2025-09-03 PROCEDURE — 1160F RVW MEDS BY RX/DR IN RCRD: CPT | Mod: CPTII,S$GLB,, | Performed by: PODIATRIST

## 2025-09-03 PROCEDURE — 3077F SYST BP >= 140 MM HG: CPT | Mod: CPTII,S$GLB,, | Performed by: PODIATRIST

## 2025-09-03 PROCEDURE — 99214 OFFICE O/P EST MOD 30 MIN: CPT | Mod: S$GLB,,, | Performed by: PODIATRIST

## 2025-09-03 PROCEDURE — 1159F MED LIST DOCD IN RCRD: CPT | Mod: CPTII,S$GLB,, | Performed by: PODIATRIST

## 2025-09-03 PROCEDURE — 3044F HG A1C LEVEL LT 7.0%: CPT | Mod: CPTII,S$GLB,, | Performed by: PODIATRIST

## 2025-09-03 PROCEDURE — 99999 PR PBB SHADOW E&M-EST. PATIENT-LVL III: CPT | Mod: PBBFAC,,, | Performed by: PODIATRIST

## 2025-09-03 RX ORDER — LIDOCAINE AND PRILOCAINE 25; 25 MG/G; MG/G
CREAM TOPICAL
Qty: 30 G | Refills: 3 | Status: SHIPPED | OUTPATIENT
Start: 2025-09-03

## (undated) DEVICE — SOL GONAK

## (undated) DEVICE — SUT 3-0 12-18IN SILK

## (undated) DEVICE — HEMOSTAT SURGICEL 4X8IN

## (undated) DEVICE — TRAY MUSCLE LID EYE

## (undated) DEVICE — SPONGE GAUZE 16PLY 4X4

## (undated) DEVICE — APPLICATOR CHLORAPREP ORN 26ML

## (undated) DEVICE — SOL BSS BALANCED SALT

## (undated) DEVICE — Device

## (undated) DEVICE — COVER MAYO STAND REINFRCD 30

## (undated) DEVICE — NDL HYPO A BEVEL 30X1/2

## (undated) DEVICE — SUT PROLENE 5-0 24 C-1 BL

## (undated) DEVICE — SUT MONOCRYL 3-0 SH U/D

## (undated) DEVICE — SOL 9P NACL IRR PIC IL

## (undated) DEVICE — LOOP VESSEL BLUE MAXI

## (undated) DEVICE — SEE MEDLINE ITEM 157131

## (undated) DEVICE — SUT PROLENE 6-0 24 BV-1

## (undated) DEVICE — COVER PROXIMA MAYO STAND

## (undated) DEVICE — OMNIPAQUE 350 200ML

## (undated) DEVICE — CANNULA ANTERIOR CHAMBER 30G

## (undated) DEVICE — SUT SILK 2-0 SH 18IN BLACK

## (undated) DEVICE — PENCIL BIPOLAR 25G STR TIP

## (undated) DEVICE — GAUZE SPONGE PEANUT STRL

## (undated) DEVICE — KIT GREY EYE

## (undated) DEVICE — SOL BETADINE 5%

## (undated) DEVICE — CLIP MED TICALL

## (undated) DEVICE — NDL HYPO STD REG BVL 30G 0.5IN

## (undated) DEVICE — KNIFE ANGLE 1MM

## (undated) DEVICE — SUT LIGACLIP SMALL XTRA

## (undated) DEVICE — SUT ETHILON 10-0 BLK MONO

## (undated) DEVICE — ADHESIVE DERMABOND ADVANCED

## (undated) DEVICE — SYR 10CC LUER LOCK

## (undated) DEVICE — DRAPE EYE

## (undated) DEVICE — GUIDEWIRE STF .035X180CM ANG

## (undated) DEVICE — PACK INJ VISC FLD 20G/23G SIL

## (undated) DEVICE — DRAPE THREE-QTR REINF 53X77IN

## (undated) DEVICE — SUT MCRYL PLUS 4-0 PS2 27IN

## (undated) DEVICE — ELECTRODE REM PLYHSV RETURN 9

## (undated) DEVICE — SUT VICRYL+ 2-0 SH 18IN

## (undated) DEVICE — KIT EVACUATOR FLAT DRAIN 100CC

## (undated) DEVICE — SOL BALANCED SALT 500ML

## (undated) DEVICE — CORD BPLR SGL USE DISP STRL

## (undated) DEVICE — SUT PROLENE 6-0 BV-1 30IN

## (undated) DEVICE — DRESSING ABSRBNT ISLAND 3.6X8

## (undated) DEVICE — SET DECANTER MEDICHOICE

## (undated) DEVICE — TRAY CATH FOL SIL URIMTR 16FR

## (undated) DEVICE — SYR 30CC LUER LOCK

## (undated) DEVICE — DRAIN BLAKE SIL HUBLS RND 19FR

## (undated) DEVICE — DRESSING EYE OVAL LF

## (undated) DEVICE — SOL NS 1000CC

## (undated) DEVICE — GOWN SURGICAL X-LARGE

## (undated) DEVICE — EVACUATOR WOUND BULB 100CC

## (undated) DEVICE — INSERTS STEALTH FIBRA SIDEBRIT

## (undated) DEVICE — DRAPE CORETEMP FLD WRM 56X62IN

## (undated) DEVICE — DRESSING ADH ISLAND 2.5 X 3

## (undated) DEVICE — CARTRIDGE B MONARCH II 10/BX

## (undated) DEVICE — FIBRILLAR ABS HEMOSTAT 4X4

## (undated) DEVICE — SEE MEDLINE ITEM 157187

## (undated) DEVICE — KIT PERFLUOROCARBON LIQUID

## (undated) DEVICE — SET IRR URLGY 2LINE UNIV SPIKE

## (undated) DEVICE — OIL SILICONE

## (undated) DEVICE — DRESSING TRANS 2X2 TEGADERM

## (undated) DEVICE — DRAPE OPTIMA MAJOR PEDIATRIC

## (undated) DEVICE — SHIELD FOX W/GARTER

## (undated) DEVICE — SUT SILK 3-0 STRANDS 30IN

## (undated) DEVICE — CLIP SPRING 6MM

## (undated) DEVICE — DRAPE SLUSH WARMER WITH DISC

## (undated) DEVICE — LENS VITRCTMY OPHTH 30DEG 59DE

## (undated) DEVICE — COVER CLAMP FABRIC RADIOPAQUE

## (undated) DEVICE — STAPLER SKIN PROXIMATE WIDE

## (undated) DEVICE — SOL WATER STRL IRR 1000ML

## (undated) DEVICE — CANNULA DUALBORE SIDEFLO 25G

## (undated) DEVICE — DRESSING TELFA STRL 4X3 LF

## (undated) DEVICE — PACK AUTO GAS FILL

## (undated) DEVICE — SYR ONLY LUER LOCK 20CC

## (undated) DEVICE — HOLDER TUBE

## (undated) DEVICE — KIT SAHARA DRAPE DRAW/LIFT

## (undated) DEVICE — BLADE SURG CARBON STEEL SZ11

## (undated) DEVICE — PACK KIDNEY TRANSPLANT CUSTOM

## (undated) DEVICE — SEE MEDLINE ITEM 146292

## (undated) DEVICE — SPONGE KITTNER 1/4X 5/8 L STRL

## (undated) DEVICE — SUT PDS BV 6-0

## (undated) DEVICE — PUNCH AORTIC 4.8MM

## (undated) DEVICE — SHEILD & GARTERS FOX METAL EYE

## (undated) DEVICE — DRAPE BAG ISOLATION 20 X 20

## (undated) DEVICE — TOWEL OR XRAY WHITE 17X26IN

## (undated) DEVICE — KIT PROBE COVER WITH GEL

## (undated) DEVICE — TAPE UMBILICAL 1/8X36IN WHITE

## (undated) DEVICE — TRAY PERIPHERAL VASCULAR OMC

## (undated) DEVICE — SEE MEDLINE ITEM 156894

## (undated) DEVICE — DRAPE INCISE IOBAN 2 23X17IN

## (undated) DEVICE — SUT 10/0 1X12IN BLK TG160-6

## (undated) DEVICE — PROTECTION STATION PLUS

## (undated) DEVICE — HEMOSTAT SURGICEL NU-KNIT 6X9

## (undated) DEVICE — CLIP SPRING 12MM

## (undated) DEVICE — AV VASCULAR ACCESS PACK

## (undated) DEVICE — SUT SILK 3-0 SH 18IN BLACK

## (undated) DEVICE — SPONGE DERMACEA 4X4IN 12PLY

## (undated) DEVICE — SUT PROLENE 5-0 36IN C-1

## (undated) DEVICE — SYR IRRIGATION BULB STER 60ML

## (undated) DEVICE — PAD EYE OVAL CNTOUR 1.62X2.62

## (undated) DEVICE — DRESSING AQUACEL SACRAL 9 X 9

## (undated) DEVICE — TOWEL OR DISP STRL BLUE 4/PK

## (undated) DEVICE — SUT 7/0 24IN PROLENE BL MO

## (undated) DEVICE — BOOT SUTURE AID

## (undated) DEVICE — COVER LIGHT HANDLE 80/CA

## (undated) DEVICE — STOCKINETTE 2INX36

## (undated) DEVICE — INFLATOR ENCORE 26 BLLN INFL

## (undated) DEVICE — FOLEY BLLN 20FR 3WAY 5CC

## (undated) DEVICE — BAND TR WITH INFLATOR

## (undated) DEVICE — SUT 7-0 VICRYL 18 TG160-8

## (undated) DEVICE — CANNULA OPTHALMIC SOF TIP 25G

## (undated) DEVICE — SPONGE IV DRAIN 4X4 STERILE

## (undated) DEVICE — PLUG CATHETER STERILE FOLEY

## (undated) DEVICE — DRESSING ADH ISLAND 3.6 X 14

## (undated) DEVICE — PACK UNIVERSAL SPLIT II

## (undated) DEVICE — SUT 4-0 12-18IN SILK BLACK

## (undated) DEVICE — SUT SILK 2-0 STRANDS 30IN

## (undated) DEVICE — NDL BOX COUNTER

## (undated) DEVICE — SUT 1 36IN PDS II VIO MONO

## (undated) DEVICE — BLLN MUSTANG 5 X 40 X 75

## (undated) DEVICE — PAD K-THERMIA 24IN X 60IN

## (undated) DEVICE — NDL RETROBULAR AKTKINSON 23G

## (undated) DEVICE — DRAPE HALF SURGICAL 40X58IN

## (undated) DEVICE — SUT PROLENE 4-0 SH BLU 36IN

## (undated) DEVICE — SUT 2-0 12-18IN SILK

## (undated) DEVICE — TIP YANKAUERS BULB NO VENT

## (undated) DEVICE — HANDSET ARGON PLUS

## (undated) DEVICE — SUT VICRYL PLUS 3-0 SH 18IN

## (undated) DEVICE — DRAPE STERI INSTRUMENT 1018

## (undated) DEVICE — CLIPPER BLADE MOD 4406 (CAREF)

## (undated) DEVICE — KIT GLIDESHEATH SLEND 6FR 10CM

## (undated) DEVICE — PROBE ILLUM FLEX CURVE LASER

## (undated) DEVICE — DRESSING TRANS 4X4 TEGADERM

## (undated) DEVICE — SUT ETHILON 3-0 PS2 18 BLK

## (undated) DEVICE — SUT 4/0 27IN PDS II VIO MON

## (undated) DEVICE — VISE RADIFOCUS MULTI TORQUE

## (undated) DEVICE — CUTTER PROXIMATE BLUE 75MM

## (undated) DEVICE — SEE MEDLINE ITEM 153688